# Patient Record
Sex: FEMALE | Race: WHITE | NOT HISPANIC OR LATINO | Employment: UNEMPLOYED | URBAN - METROPOLITAN AREA
[De-identification: names, ages, dates, MRNs, and addresses within clinical notes are randomized per-mention and may not be internally consistent; named-entity substitution may affect disease eponyms.]

---

## 2017-01-14 ENCOUNTER — LAB VISIT (OUTPATIENT)
Dept: LAB | Facility: HOSPITAL | Age: 47
End: 2017-01-14
Attending: INTERNAL MEDICINE
Payer: MEDICARE

## 2017-01-14 DIAGNOSIS — Z94.4 LIVER REPLACED BY TRANSPLANT: ICD-10-CM

## 2017-01-14 LAB
ALBUMIN SERPL BCP-MCNC: 3 G/DL
ALP SERPL-CCNC: 311 U/L
ALT SERPL W/O P-5'-P-CCNC: 42 U/L
ANION GAP SERPL CALC-SCNC: 7 MMOL/L
AST SERPL-CCNC: 42 U/L
BASOPHILS # BLD AUTO: 0.04 K/UL
BASOPHILS NFR BLD: 1.2 %
BILIRUB SERPL-MCNC: 0.9 MG/DL
BUN SERPL-MCNC: 13 MG/DL
CALCIUM SERPL-MCNC: 9.1 MG/DL
CHLORIDE SERPL-SCNC: 105 MMOL/L
CO2 SERPL-SCNC: 26 MMOL/L
CREAT SERPL-MCNC: 0.9 MG/DL
DIFFERENTIAL METHOD: ABNORMAL
EOSINOPHIL # BLD AUTO: 0.1 K/UL
EOSINOPHIL NFR BLD: 4.3 %
ERYTHROCYTE [DISTWIDTH] IN BLOOD BY AUTOMATED COUNT: 16.1 %
EST. GFR  (AFRICAN AMERICAN): >60 ML/MIN/1.73 M^2
EST. GFR  (NON AFRICAN AMERICAN): >60 ML/MIN/1.73 M^2
GGT SERPL-CCNC: 865 U/L
GLUCOSE SERPL-MCNC: 86 MG/DL
HCT VFR BLD AUTO: 33.4 %
HGB BLD-MCNC: 10.7 G/DL
LYMPHOCYTES # BLD AUTO: 0.8 K/UL
LYMPHOCYTES NFR BLD: 25.5 %
MCH RBC QN AUTO: 28.4 PG
MCHC RBC AUTO-ENTMCNC: 32 %
MCV RBC AUTO: 89 FL
MONOCYTES # BLD AUTO: 0.4 K/UL
MONOCYTES NFR BLD: 11.6 %
NEUTROPHILS # BLD AUTO: 1.9 K/UL
NEUTROPHILS NFR BLD: 57.4 %
PLATELET # BLD AUTO: 126 K/UL
PMV BLD AUTO: 11.3 FL
POTASSIUM SERPL-SCNC: 3.7 MMOL/L
PROT SERPL-MCNC: 6.8 G/DL
RBC # BLD AUTO: 3.77 M/UL
SODIUM SERPL-SCNC: 138 MMOL/L
WBC # BLD AUTO: 3.29 K/UL

## 2017-01-14 PROCEDURE — 85025 COMPLETE CBC W/AUTO DIFF WBC: CPT

## 2017-01-14 PROCEDURE — 82977 ASSAY OF GGT: CPT

## 2017-01-14 PROCEDURE — 36415 COLL VENOUS BLD VENIPUNCTURE: CPT | Mod: PO

## 2017-01-14 PROCEDURE — 80197 ASSAY OF TACROLIMUS: CPT

## 2017-01-14 PROCEDURE — 80053 COMPREHEN METABOLIC PANEL: CPT

## 2017-01-15 LAB — TACROLIMUS BLD-MCNC: 11.9 NG/ML

## 2017-01-17 ENCOUNTER — TELEPHONE (OUTPATIENT)
Dept: TRANSPLANT | Facility: CLINIC | Age: 47
End: 2017-01-17

## 2017-01-17 NOTE — TELEPHONE ENCOUNTER
Labs reviewed by Dr. Nielsen  Continue routine labs no changes needed.  Letter sent for next lab appointment 04/15/17

## 2017-01-17 NOTE — TELEPHONE ENCOUNTER
----- Message from Jaya Nielsen MD sent at 1/15/2017 10:45 AM CST -----  Results reviewed. Please advise labs are stable.

## 2017-01-24 DIAGNOSIS — Z94.4 LIVER REPLACED BY TRANSPLANT: ICD-10-CM

## 2017-01-24 RX ORDER — MYCOPHENOLATE MOFETIL 250 MG/1
500 CAPSULE ORAL 2 TIMES DAILY
Qty: 120 CAPSULE | Refills: 6 | Status: SHIPPED | OUTPATIENT
Start: 2017-01-24 | End: 2018-07-06 | Stop reason: SDUPTHER

## 2017-04-22 ENCOUNTER — LAB VISIT (OUTPATIENT)
Dept: LAB | Facility: HOSPITAL | Age: 47
End: 2017-04-22
Attending: INTERNAL MEDICINE
Payer: MEDICARE

## 2017-04-22 DIAGNOSIS — Z94.4 LIVER REPLACED BY TRANSPLANT: ICD-10-CM

## 2017-04-22 LAB
ALBUMIN SERPL BCP-MCNC: 3.1 G/DL
ALP SERPL-CCNC: 337 U/L
ALT SERPL W/O P-5'-P-CCNC: 34 U/L
ANION GAP SERPL CALC-SCNC: 6 MMOL/L
AST SERPL-CCNC: 38 U/L
BASOPHILS # BLD AUTO: 0.04 K/UL
BASOPHILS NFR BLD: 0.9 %
BILIRUB SERPL-MCNC: 0.9 MG/DL
BUN SERPL-MCNC: 19 MG/DL
CALCIUM SERPL-MCNC: 9.4 MG/DL
CHLORIDE SERPL-SCNC: 102 MMOL/L
CO2 SERPL-SCNC: 29 MMOL/L
CREAT SERPL-MCNC: 1 MG/DL
DIFFERENTIAL METHOD: ABNORMAL
EOSINOPHIL # BLD AUTO: 0.2 K/UL
EOSINOPHIL NFR BLD: 4.5 %
ERYTHROCYTE [DISTWIDTH] IN BLOOD BY AUTOMATED COUNT: 14.6 %
EST. GFR  (AFRICAN AMERICAN): >60 ML/MIN/1.73 M^2
EST. GFR  (NON AFRICAN AMERICAN): >60 ML/MIN/1.73 M^2
GGT SERPL-CCNC: 1245 U/L
GLUCOSE SERPL-MCNC: 80 MG/DL
HCT VFR BLD AUTO: 34 %
HGB BLD-MCNC: 10.9 G/DL
LYMPHOCYTES # BLD AUTO: 1 K/UL
LYMPHOCYTES NFR BLD: 20.8 %
MCH RBC QN AUTO: 28.7 PG
MCHC RBC AUTO-ENTMCNC: 32.1 %
MCV RBC AUTO: 90 FL
MONOCYTES # BLD AUTO: 0.5 K/UL
MONOCYTES NFR BLD: 11.4 %
NEUTROPHILS # BLD AUTO: 2.9 K/UL
NEUTROPHILS NFR BLD: 62.2 %
PLATELET # BLD AUTO: 107 K/UL
PMV BLD AUTO: 10.9 FL
POTASSIUM SERPL-SCNC: 3.8 MMOL/L
PROT SERPL-MCNC: 6.6 G/DL
RBC # BLD AUTO: 3.8 M/UL
SODIUM SERPL-SCNC: 137 MMOL/L
WBC # BLD AUTO: 4.66 K/UL

## 2017-04-22 PROCEDURE — 36415 COLL VENOUS BLD VENIPUNCTURE: CPT | Mod: PO

## 2017-04-22 PROCEDURE — 82977 ASSAY OF GGT: CPT

## 2017-04-22 PROCEDURE — 80197 ASSAY OF TACROLIMUS: CPT

## 2017-04-22 PROCEDURE — 85025 COMPLETE CBC W/AUTO DIFF WBC: CPT

## 2017-04-22 PROCEDURE — 80053 COMPREHEN METABOLIC PANEL: CPT

## 2017-04-23 LAB — TACROLIMUS BLD-MCNC: 16.1 NG/ML

## 2017-04-24 ENCOUNTER — TELEPHONE (OUTPATIENT)
Dept: TRANSPLANT | Facility: CLINIC | Age: 47
End: 2017-04-24

## 2017-04-24 ENCOUNTER — PATIENT MESSAGE (OUTPATIENT)
Dept: TRANSPLANT | Facility: CLINIC | Age: 47
End: 2017-04-24

## 2017-04-24 NOTE — TELEPHONE ENCOUNTER
----- Message from Jaya Nielsen MD sent at 4/24/2017  8:06 AM CDT -----  Is it an accurate tac level?

## 2017-04-28 ENCOUNTER — TELEPHONE (OUTPATIENT)
Dept: TRANSPLANT | Facility: CLINIC | Age: 47
End: 2017-04-28

## 2017-04-28 DIAGNOSIS — Z94.4 LIVER REPLACED BY TRANSPLANT: ICD-10-CM

## 2017-04-28 NOTE — TELEPHONE ENCOUNTER
----- Message from Jaya Nielsen MD sent at 4/28/2017  1:28 PM CDT -----  Reduce tac to 2/1  ----- Message -----     From: Maude Chau RN     Sent: 4/28/2017  12:56 PM       To: Jaya Nielsen MD    Yes this is an accurate level.  She did take her medication later than usual the night before.  ----- Message -----     From: Jaya Nielsen MD     Sent: 4/24/2017   8:06 AM       To: [facility] Post-Liver Transplant Clinical    Is it an accurate tac level?

## 2017-04-28 NOTE — TELEPHONE ENCOUNTER
Labs reviewed by Dr. Nielsen  Called patient to have her reduce Prograf level to 2 mg int he morning and 1 mg in the evening, repeat labs on 05/13/17.  Patient repeated and verbalized understanding.

## 2017-04-28 NOTE — TELEPHONE ENCOUNTER
Called patient again to see if she took her Prograf before her labs.    Patient stated she took her medication late the night before the labs.

## 2017-04-30 RX ORDER — TACROLIMUS 1 MG/1
CAPSULE ORAL
Qty: 270 CAPSULE | Refills: 3 | Status: SHIPPED | OUTPATIENT
Start: 2017-04-30 | End: 2017-05-16 | Stop reason: SDUPTHER

## 2017-05-05 RX ORDER — ONDANSETRON HYDROCHLORIDE 8 MG/1
8 TABLET, FILM COATED ORAL EVERY 8 HOURS PRN
Qty: 30 TABLET | Refills: 3 | Status: SHIPPED | OUTPATIENT
Start: 2017-05-05 | End: 2018-03-22 | Stop reason: SDUPTHER

## 2017-05-12 ENCOUNTER — LAB VISIT (OUTPATIENT)
Dept: LAB | Facility: HOSPITAL | Age: 47
End: 2017-05-12
Attending: INTERNAL MEDICINE
Payer: MEDICARE

## 2017-05-12 DIAGNOSIS — Z94.4 LIVER REPLACED BY TRANSPLANT: ICD-10-CM

## 2017-05-12 DIAGNOSIS — E03.9 UNSPECIFIED HYPOTHYROIDISM: Primary | ICD-10-CM

## 2017-05-12 LAB
ALBUMIN SERPL BCP-MCNC: 3.5 G/DL
ALP SERPL-CCNC: 352 U/L
ALT SERPL W/O P-5'-P-CCNC: 73 U/L
ANION GAP SERPL CALC-SCNC: 8 MMOL/L
AST SERPL-CCNC: 85 U/L
BASOPHILS # BLD AUTO: 0.03 K/UL
BASOPHILS NFR BLD: 0.9 %
BILIRUB SERPL-MCNC: 1.1 MG/DL
BUN SERPL-MCNC: 13 MG/DL
CALCIUM SERPL-MCNC: 8.9 MG/DL
CHLORIDE SERPL-SCNC: 101 MMOL/L
CHOLEST/HDLC SERPL: 2.2 {RATIO}
CO2 SERPL-SCNC: 25 MMOL/L
CREAT SERPL-MCNC: 0.9 MG/DL
DIFFERENTIAL METHOD: ABNORMAL
EOSINOPHIL # BLD AUTO: 0.1 K/UL
EOSINOPHIL NFR BLD: 3.8 %
ERYTHROCYTE [DISTWIDTH] IN BLOOD BY AUTOMATED COUNT: 14.8 %
EST. GFR  (AFRICAN AMERICAN): >60 ML/MIN/1.73 M^2
EST. GFR  (NON AFRICAN AMERICAN): >60 ML/MIN/1.73 M^2
GGT SERPL-CCNC: 1235 U/L
GLUCOSE SERPL-MCNC: 82 MG/DL
HCT VFR BLD AUTO: 36.6 %
HDL/CHOLESTEROL RATIO: 45 %
HDLC SERPL-MCNC: 134 MG/DL
HDLC SERPL-MCNC: 298 MG/DL
HGB BLD-MCNC: 12.2 G/DL
LDLC SERPL CALC-MCNC: 147.2 MG/DL
LYMPHOCYTES # BLD AUTO: 1 K/UL
LYMPHOCYTES NFR BLD: 27.9 %
MCH RBC QN AUTO: 29.6 PG
MCHC RBC AUTO-ENTMCNC: 33.3 %
MCV RBC AUTO: 89 FL
MONOCYTES # BLD AUTO: 0.3 K/UL
MONOCYTES NFR BLD: 9.4 %
NEUTROPHILS # BLD AUTO: 2 K/UL
NEUTROPHILS NFR BLD: 58 %
NONHDLC SERPL-MCNC: 164 MG/DL
PLATELET # BLD AUTO: 126 K/UL
PMV BLD AUTO: 12 FL
POTASSIUM SERPL-SCNC: 4.1 MMOL/L
PROT SERPL-MCNC: 7.6 G/DL
RBC # BLD AUTO: 4.12 M/UL
SODIUM SERPL-SCNC: 134 MMOL/L
T4 FREE SERPL-MCNC: 0.92 NG/DL
TRIGL SERPL-MCNC: 84 MG/DL
TSH SERPL DL<=0.005 MIU/L-ACNC: 1.59 UIU/ML
WBC # BLD AUTO: 3.4 K/UL

## 2017-05-12 PROCEDURE — 85025 COMPLETE CBC W/AUTO DIFF WBC: CPT

## 2017-05-12 PROCEDURE — 84443 ASSAY THYROID STIM HORMONE: CPT

## 2017-05-12 PROCEDURE — 84439 ASSAY OF FREE THYROXINE: CPT

## 2017-05-12 PROCEDURE — 80053 COMPREHEN METABOLIC PANEL: CPT

## 2017-05-12 PROCEDURE — 80197 ASSAY OF TACROLIMUS: CPT

## 2017-05-12 PROCEDURE — 82977 ASSAY OF GGT: CPT

## 2017-05-12 PROCEDURE — 80061 LIPID PANEL: CPT

## 2017-05-12 PROCEDURE — 82652 VIT D 1 25-DIHYDROXY: CPT

## 2017-05-13 LAB — TACROLIMUS BLD-MCNC: 14.6 NG/ML

## 2017-05-15 LAB — 1,25(OH)2D3 SERPL-MCNC: 70 PG/ML

## 2017-05-16 DIAGNOSIS — Z94.4 LIVER REPLACED BY TRANSPLANT: ICD-10-CM

## 2017-05-16 RX ORDER — TACROLIMUS 1 MG/1
1 CAPSULE ORAL EVERY 12 HOURS
Qty: 180 CAPSULE | Refills: 3 | Status: SHIPPED | OUTPATIENT
Start: 2017-05-16 | End: 2017-06-19 | Stop reason: SDUPTHER

## 2017-05-16 NOTE — TELEPHONE ENCOUNTER
Labs reviewed by Dr. Nielsen  Called patient to reduce Prograf to 1 mg bid and repeat labs in 1 month.  Patient repeated and verbalized understanding.

## 2017-05-16 NOTE — TELEPHONE ENCOUNTER
----- Message from Jaya Nielsen MD sent at 5/16/2017  9:07 AM CDT -----  Please reduce tac to 1/1  ----- Message -----     From: Maude Chau RN     Sent: 5/15/2017  10:40 AM       To: Jaya Nielsen MD    yes  ----- Message -----     From: Jaya Nielsen MD     Sent: 5/15/2017   7:39 AM       To: [facility] Post-Liver Transplant Clinical    Is tacrolimus level accurate

## 2017-06-17 ENCOUNTER — LAB VISIT (OUTPATIENT)
Dept: LAB | Facility: HOSPITAL | Age: 47
End: 2017-06-17
Attending: INTERNAL MEDICINE
Payer: MEDICARE

## 2017-06-17 DIAGNOSIS — Z94.4 LIVER REPLACED BY TRANSPLANT: ICD-10-CM

## 2017-06-17 LAB
ALBUMIN SERPL BCP-MCNC: 2.9 G/DL
ALP SERPL-CCNC: 254 U/L
ALT SERPL W/O P-5'-P-CCNC: 25 U/L
ANION GAP SERPL CALC-SCNC: 8 MMOL/L
AST SERPL-CCNC: 40 U/L
BASOPHILS # BLD AUTO: 0.04 K/UL
BASOPHILS NFR BLD: 1 %
BILIRUB SERPL-MCNC: 0.9 MG/DL
BUN SERPL-MCNC: 21 MG/DL
CALCIUM SERPL-MCNC: 8.7 MG/DL
CHLORIDE SERPL-SCNC: 102 MMOL/L
CO2 SERPL-SCNC: 25 MMOL/L
CREAT SERPL-MCNC: 0.8 MG/DL
DIFFERENTIAL METHOD: ABNORMAL
EOSINOPHIL # BLD AUTO: 0.4 K/UL
EOSINOPHIL NFR BLD: 9 %
ERYTHROCYTE [DISTWIDTH] IN BLOOD BY AUTOMATED COUNT: 14.2 %
EST. GFR  (AFRICAN AMERICAN): >60 ML/MIN/1.73 M^2
EST. GFR  (NON AFRICAN AMERICAN): >60 ML/MIN/1.73 M^2
GGT SERPL-CCNC: 551 U/L
GLUCOSE SERPL-MCNC: 78 MG/DL
HCT VFR BLD AUTO: 29.3 %
HGB BLD-MCNC: 9.5 G/DL
LYMPHOCYTES # BLD AUTO: 0.8 K/UL
LYMPHOCYTES NFR BLD: 18.6 %
MCH RBC QN AUTO: 28.5 PG
MCHC RBC AUTO-ENTMCNC: 32.4 %
MCV RBC AUTO: 88 FL
MONOCYTES # BLD AUTO: 0.6 K/UL
MONOCYTES NFR BLD: 13.1 %
NEUTROPHILS # BLD AUTO: 2.4 K/UL
NEUTROPHILS NFR BLD: 58.1 %
PLATELET # BLD AUTO: 126 K/UL
PMV BLD AUTO: 9.9 FL
POTASSIUM SERPL-SCNC: 4.4 MMOL/L
PROT SERPL-MCNC: 6.2 G/DL
RBC # BLD AUTO: 3.33 M/UL
SODIUM SERPL-SCNC: 135 MMOL/L
WBC # BLD AUTO: 4.2 K/UL

## 2017-06-17 PROCEDURE — 82977 ASSAY OF GGT: CPT

## 2017-06-17 PROCEDURE — 36415 COLL VENOUS BLD VENIPUNCTURE: CPT | Mod: PO

## 2017-06-17 PROCEDURE — 80053 COMPREHEN METABOLIC PANEL: CPT

## 2017-06-17 PROCEDURE — 85025 COMPLETE CBC W/AUTO DIFF WBC: CPT

## 2017-06-17 PROCEDURE — 80197 ASSAY OF TACROLIMUS: CPT

## 2017-06-18 LAB — TACROLIMUS BLD-MCNC: 3.5 NG/ML

## 2017-06-19 DIAGNOSIS — Z94.4 LIVER REPLACED BY TRANSPLANT: ICD-10-CM

## 2017-06-19 DIAGNOSIS — Z94.4 LIVER REPLACED BY TRANSPLANT: Primary | ICD-10-CM

## 2017-06-19 RX ORDER — TACROLIMUS 1 MG/1
CAPSULE ORAL
Qty: 270 CAPSULE | Refills: 3 | Status: SHIPPED | OUTPATIENT
Start: 2017-06-19 | End: 2018-03-05 | Stop reason: SDUPTHER

## 2017-06-19 NOTE — TELEPHONE ENCOUNTER
Labs reviewed by Dr. Nielsen  Called patient to let her know to increase Prograf to 2 mg in the morning and 1 mg in the evening.  She will repeat labs on 07/15/17, left voicemail also sent letter

## 2017-06-21 DIAGNOSIS — Z94.4 LIVER REPLACED BY TRANSPLANT: Primary | ICD-10-CM

## 2017-06-28 ENCOUNTER — PATIENT MESSAGE (OUTPATIENT)
Dept: TRANSPLANT | Facility: CLINIC | Age: 47
End: 2017-06-28

## 2017-07-15 ENCOUNTER — LAB VISIT (OUTPATIENT)
Dept: LAB | Facility: HOSPITAL | Age: 47
End: 2017-07-15
Attending: INTERNAL MEDICINE
Payer: MEDICARE

## 2017-07-15 DIAGNOSIS — Z94.4 LIVER REPLACED BY TRANSPLANT: ICD-10-CM

## 2017-07-15 LAB
ALBUMIN SERPL BCP-MCNC: 3.1 G/DL
ALP SERPL-CCNC: 403 U/L
ALT SERPL W/O P-5'-P-CCNC: 74 U/L
ANION GAP SERPL CALC-SCNC: 9 MMOL/L
AST SERPL-CCNC: 67 U/L
BASOPHILS # BLD AUTO: 0.05 K/UL
BASOPHILS NFR BLD: 1.6 %
BILIRUB SERPL-MCNC: 1 MG/DL
BUN SERPL-MCNC: 22 MG/DL
CALCIUM SERPL-MCNC: 9 MG/DL
CHLORIDE SERPL-SCNC: 100 MMOL/L
CO2 SERPL-SCNC: 24 MMOL/L
CREAT SERPL-MCNC: 0.9 MG/DL
DIFFERENTIAL METHOD: ABNORMAL
EOSINOPHIL # BLD AUTO: 0.2 K/UL
EOSINOPHIL NFR BLD: 7.9 %
ERYTHROCYTE [DISTWIDTH] IN BLOOD BY AUTOMATED COUNT: 15.3 %
EST. GFR  (AFRICAN AMERICAN): >60 ML/MIN/1.73 M^2
EST. GFR  (NON AFRICAN AMERICAN): >60 ML/MIN/1.73 M^2
GGT SERPL-CCNC: 942 U/L
GLUCOSE SERPL-MCNC: 86 MG/DL
HCT VFR BLD AUTO: 32.4 %
HGB BLD-MCNC: 10.9 G/DL
LYMPHOCYTES # BLD AUTO: 0.7 K/UL
LYMPHOCYTES NFR BLD: 23 %
MCH RBC QN AUTO: 29.7 PG
MCHC RBC AUTO-ENTMCNC: 33.6 %
MCV RBC AUTO: 88 FL
MONOCYTES # BLD AUTO: 0.4 K/UL
MONOCYTES NFR BLD: 12.1 %
NEUTROPHILS # BLD AUTO: 1.7 K/UL
NEUTROPHILS NFR BLD: 55.1 %
PLATELET # BLD AUTO: 114 K/UL
PMV BLD AUTO: 10.5 FL
POTASSIUM SERPL-SCNC: 4.4 MMOL/L
PROT SERPL-MCNC: 6.8 G/DL
RBC # BLD AUTO: 3.67 M/UL
SODIUM SERPL-SCNC: 133 MMOL/L
WBC # BLD AUTO: 3.05 K/UL

## 2017-07-15 PROCEDURE — 80053 COMPREHEN METABOLIC PANEL: CPT

## 2017-07-15 PROCEDURE — 85025 COMPLETE CBC W/AUTO DIFF WBC: CPT

## 2017-07-15 PROCEDURE — 82977 ASSAY OF GGT: CPT

## 2017-07-15 PROCEDURE — 36415 COLL VENOUS BLD VENIPUNCTURE: CPT | Mod: PO

## 2017-07-15 PROCEDURE — 80197 ASSAY OF TACROLIMUS: CPT

## 2017-07-16 LAB — TACROLIMUS BLD-MCNC: 9.3 NG/ML

## 2017-07-17 ENCOUNTER — TELEPHONE (OUTPATIENT)
Dept: TRANSPLANT | Facility: CLINIC | Age: 47
End: 2017-07-17

## 2017-07-17 NOTE — TELEPHONE ENCOUNTER
----- Message from Jaya Nielsen MD sent at 7/17/2017  9:26 AM CDT -----  Results reviewed. Please advise labs are stable.

## 2017-07-17 NOTE — TELEPHONE ENCOUNTER
Labs reviewed by Dr. Nielsen  Continue routine labs no changes needed.  Letter sent for next lab appointment 09/09/17

## 2017-09-07 ENCOUNTER — LAB VISIT (OUTPATIENT)
Dept: LAB | Facility: HOSPITAL | Age: 47
End: 2017-09-07
Attending: INTERNAL MEDICINE
Payer: MEDICARE

## 2017-09-07 DIAGNOSIS — Z94.4 LIVER REPLACED BY TRANSPLANT: ICD-10-CM

## 2017-09-07 LAB
ALBUMIN SERPL BCP-MCNC: 3 G/DL
ALP SERPL-CCNC: 462 U/L
ALT SERPL W/O P-5'-P-CCNC: 36 U/L
ANION GAP SERPL CALC-SCNC: 11 MMOL/L
AST SERPL-CCNC: 46 U/L
BASOPHILS # BLD AUTO: 0.05 K/UL
BASOPHILS NFR BLD: 0.9 %
BILIRUB SERPL-MCNC: 0.9 MG/DL
BUN SERPL-MCNC: 15 MG/DL
CALCIUM SERPL-MCNC: 9.2 MG/DL
CHLORIDE SERPL-SCNC: 101 MMOL/L
CO2 SERPL-SCNC: 23 MMOL/L
CREAT SERPL-MCNC: 0.8 MG/DL
DIFFERENTIAL METHOD: ABNORMAL
EOSINOPHIL # BLD AUTO: 1.4 K/UL
EOSINOPHIL NFR BLD: 25 %
ERYTHROCYTE [DISTWIDTH] IN BLOOD BY AUTOMATED COUNT: 15.5 %
EST. GFR  (AFRICAN AMERICAN): >60 ML/MIN/1.73 M^2
EST. GFR  (NON AFRICAN AMERICAN): >60 ML/MIN/1.73 M^2
GGT SERPL-CCNC: 762 U/L
GLUCOSE SERPL-MCNC: 89 MG/DL
HCT VFR BLD AUTO: 31.9 %
HGB BLD-MCNC: 10.9 G/DL
LYMPHOCYTES # BLD AUTO: 0.9 K/UL
LYMPHOCYTES NFR BLD: 15 %
MCH RBC QN AUTO: 28.8 PG
MCHC RBC AUTO-ENTMCNC: 34.2 G/DL
MCV RBC AUTO: 84 FL
MONOCYTES # BLD AUTO: 0.6 K/UL
MONOCYTES NFR BLD: 10.4 %
NEUTROPHILS # BLD AUTO: 2.8 K/UL
NEUTROPHILS NFR BLD: 48.5 %
PLATELET # BLD AUTO: 142 K/UL
PMV BLD AUTO: 11.6 FL
POTASSIUM SERPL-SCNC: 4.3 MMOL/L
PROT SERPL-MCNC: 6.8 G/DL
RBC # BLD AUTO: 3.79 M/UL
SODIUM SERPL-SCNC: 135 MMOL/L
WBC # BLD AUTO: 5.67 K/UL

## 2017-09-07 PROCEDURE — 82977 ASSAY OF GGT: CPT

## 2017-09-07 PROCEDURE — 36415 COLL VENOUS BLD VENIPUNCTURE: CPT | Mod: PO

## 2017-09-07 PROCEDURE — 85025 COMPLETE CBC W/AUTO DIFF WBC: CPT

## 2017-09-07 PROCEDURE — 80197 ASSAY OF TACROLIMUS: CPT

## 2017-09-07 PROCEDURE — 80053 COMPREHEN METABOLIC PANEL: CPT

## 2017-09-08 LAB — TACROLIMUS BLD-MCNC: 8.5 NG/ML

## 2017-09-11 ENCOUNTER — TELEPHONE (OUTPATIENT)
Dept: TRANSPLANT | Facility: CLINIC | Age: 47
End: 2017-09-11

## 2017-09-11 NOTE — TELEPHONE ENCOUNTER
Labs reviewed by Dr. Nielsen  Continue routine labs no changes needed.  Letter sent for next lab appointment 12/09/17

## 2017-09-11 NOTE — TELEPHONE ENCOUNTER
----- Message from Jaya Nielsen MD sent at 9/11/2017  7:41 AM CDT -----  Results reviewed. Please advise labs are stable.

## 2017-12-09 ENCOUNTER — PATIENT MESSAGE (OUTPATIENT)
Dept: TRANSPLANT | Facility: CLINIC | Age: 47
End: 2017-12-09

## 2017-12-12 ENCOUNTER — PATIENT MESSAGE (OUTPATIENT)
Dept: TRANSPLANT | Facility: CLINIC | Age: 47
End: 2017-12-12

## 2017-12-13 ENCOUNTER — TELEPHONE (OUTPATIENT)
Dept: TRANSPLANT | Facility: CLINIC | Age: 47
End: 2017-12-13

## 2017-12-14 ENCOUNTER — LAB VISIT (OUTPATIENT)
Dept: LAB | Facility: HOSPITAL | Age: 47
End: 2017-12-14
Attending: INTERNAL MEDICINE
Payer: MEDICARE

## 2017-12-14 DIAGNOSIS — Z94.4 LIVER REPLACED BY TRANSPLANT: ICD-10-CM

## 2017-12-14 LAB
ALBUMIN SERPL BCP-MCNC: 3 G/DL
ALP SERPL-CCNC: 307 U/L
ALT SERPL W/O P-5'-P-CCNC: 35 U/L
ANION GAP SERPL CALC-SCNC: 6 MMOL/L
AST SERPL-CCNC: 44 U/L
BASOPHILS # BLD AUTO: 0.03 K/UL
BASOPHILS NFR BLD: 0.9 %
BILIRUB SERPL-MCNC: 0.9 MG/DL
BUN SERPL-MCNC: 12 MG/DL
CALCIUM SERPL-MCNC: 8.9 MG/DL
CHLORIDE SERPL-SCNC: 98 MMOL/L
CO2 SERPL-SCNC: 29 MMOL/L
CREAT SERPL-MCNC: 0.8 MG/DL
DIFFERENTIAL METHOD: ABNORMAL
EOSINOPHIL # BLD AUTO: 0.1 K/UL
EOSINOPHIL NFR BLD: 4.1 %
ERYTHROCYTE [DISTWIDTH] IN BLOOD BY AUTOMATED COUNT: 14.4 %
EST. GFR  (AFRICAN AMERICAN): >60 ML/MIN/1.73 M^2
EST. GFR  (NON AFRICAN AMERICAN): >60 ML/MIN/1.73 M^2
GGT SERPL-CCNC: 639 U/L
GLUCOSE SERPL-MCNC: 91 MG/DL
HCT VFR BLD AUTO: 32.3 %
HGB BLD-MCNC: 10.4 G/DL
IMM GRANULOCYTES # BLD AUTO: 0 K/UL
IMM GRANULOCYTES NFR BLD AUTO: 0 %
LYMPHOCYTES # BLD AUTO: 0.7 K/UL
LYMPHOCYTES NFR BLD: 21.3 %
MCH RBC QN AUTO: 28.1 PG
MCHC RBC AUTO-ENTMCNC: 32.2 G/DL
MCV RBC AUTO: 87 FL
MONOCYTES # BLD AUTO: 0.5 K/UL
MONOCYTES NFR BLD: 13.3 %
NEUTROPHILS # BLD AUTO: 2 K/UL
NEUTROPHILS NFR BLD: 60.4 %
NRBC BLD-RTO: 0 /100 WBC
PLATELET # BLD AUTO: 134 K/UL
PMV BLD AUTO: 11.8 FL
POTASSIUM SERPL-SCNC: 4.1 MMOL/L
PROT SERPL-MCNC: 6.8 G/DL
RBC # BLD AUTO: 3.7 M/UL
SODIUM SERPL-SCNC: 133 MMOL/L
WBC # BLD AUTO: 3.38 K/UL

## 2017-12-14 PROCEDURE — 82977 ASSAY OF GGT: CPT

## 2017-12-14 PROCEDURE — 80053 COMPREHEN METABOLIC PANEL: CPT

## 2017-12-14 PROCEDURE — 85025 COMPLETE CBC W/AUTO DIFF WBC: CPT

## 2017-12-14 PROCEDURE — 36415 COLL VENOUS BLD VENIPUNCTURE: CPT | Mod: PO

## 2017-12-14 PROCEDURE — 80197 ASSAY OF TACROLIMUS: CPT

## 2017-12-15 ENCOUNTER — TELEPHONE (OUTPATIENT)
Dept: TRANSPLANT | Facility: CLINIC | Age: 47
End: 2017-12-15

## 2017-12-15 LAB — TACROLIMUS BLD-MCNC: 6.8 NG/ML

## 2017-12-15 NOTE — TELEPHONE ENCOUNTER
Labs reviewed by Dr. Nielsen  Continue routine labs no changes needed.  Letter sent for next lab appointment 03/03/18

## 2017-12-15 NOTE — TELEPHONE ENCOUNTER
----- Message from Jaya Nielsen MD sent at 12/15/2017 10:49 AM CST -----  Results reviewed. Please advise labs are stable.

## 2018-01-03 ENCOUNTER — OFFICE VISIT (OUTPATIENT)
Dept: TRANSPLANT | Facility: CLINIC | Age: 48
End: 2018-01-03
Payer: MEDICARE

## 2018-01-03 VITALS
WEIGHT: 158.75 LBS | HEART RATE: 92 BPM | SYSTOLIC BLOOD PRESSURE: 158 MMHG | TEMPERATURE: 99 F | DIASTOLIC BLOOD PRESSURE: 79 MMHG | HEIGHT: 59 IN | OXYGEN SATURATION: 100 % | RESPIRATION RATE: 18 BRPM | BODY MASS INDEX: 32 KG/M2

## 2018-01-03 DIAGNOSIS — K83.1 BILIARY OBSTRUCTION OF TRANSPLANTED LIVER: ICD-10-CM

## 2018-01-03 DIAGNOSIS — T86.41 CHRONIC REJECTION OF LIVER TRANSPLANT: ICD-10-CM

## 2018-01-03 DIAGNOSIS — Z94.4 S/P LIVER TRANSPLANT: Primary | Chronic | ICD-10-CM

## 2018-01-03 DIAGNOSIS — Z79.60 LONG-TERM USE OF IMMUNOSUPPRESSANT MEDICATION: ICD-10-CM

## 2018-01-03 DIAGNOSIS — G40.909 NONINTRACTABLE EPILEPSY WITHOUT STATUS EPILEPTICUS, UNSPECIFIED EPILEPSY TYPE: ICD-10-CM

## 2018-01-03 DIAGNOSIS — Z29.89 PROPHYLACTIC IMMUNOTHERAPY: Chronic | ICD-10-CM

## 2018-01-03 DIAGNOSIS — T86.49 BILIARY OBSTRUCTION OF TRANSPLANTED LIVER: ICD-10-CM

## 2018-01-03 DIAGNOSIS — R55 SYNCOPE AND COLLAPSE: ICD-10-CM

## 2018-01-03 PROCEDURE — 99999 PR PBB SHADOW E&M-EST. PATIENT-LVL IV: CPT | Mod: PBBFAC,,, | Performed by: NURSE PRACTITIONER

## 2018-01-03 PROCEDURE — 99214 OFFICE O/P EST MOD 30 MIN: CPT | Mod: S$GLB,,, | Performed by: NURSE PRACTITIONER

## 2018-01-03 RX ORDER — LACTULOSE 10 G/15ML
SOLUTION ORAL; RECTAL
Qty: 2025 ML | Refills: 0 | Status: SHIPPED | OUTPATIENT
Start: 2018-01-03 | End: 2018-01-03 | Stop reason: SDUPTHER

## 2018-01-03 NOTE — LETTER
January 3, 2018        Jordana Woods  [address] [facility] [address]  Phone: [phone]  Fax: [phone]             [facility] Trans[facility]  [address]  [facility] [address]  Phone: [phone]   Patient: Elda Hernandez   MR Number: [id number]   YOB: 1970   Date of Visit: 1/3/2018       Dear Dr. Jordana Woods    Thank you for referring Elda Hernandez to me for evaluation. Attached you will find relevant portions of my assessment and plan of care.    If you have questions, please do not hesitate to call me. I look forward to following Elda Hernandez along with you.    Sincerely,    Raisa Duff, ESTRADA    Enclosure    If you would like to receive this communication electronically, please contact [email or web address] or [phone] to request [employer] Link access.    [employer] Link is a tool which provides read-only access to select patient information with whom you have a relationship. Its easy to use and provides real time access to review your patients record including encounter summaries, notes, results, and demographic information.    If you feel you have received this communication in error or would no longer like to receive these types of communications, please e-mail [email or web address]

## 2018-01-03 NOTE — PROGRESS NOTES
Transplant Hepatology  Liver Transplant Recipient Follow-up    Transplant Date: 9/23/2002  UNOS Native Liver Dx: METDIS: Glycogen Storage Disease Type I (GSD-I)    Elda Doran is here for follow up of her liver transplant.    ORGAN: LIVER  Whole or Partial: whole liver  Donor CMV Status: Positive  Donor HCV Status: Negative  Donor HBcAb: Negative      She has had the following complications since transplant: chronic rejection and biliary stricture.   Liver biopsy in 2015 with features of chronic and acute rejection. Treated with steroid pulse and taper. Last ERCP in 2015 with sphincterotomy; biliary stent removed. The noted complications are well controlled. LFTs have since stabilized.     Subjective:     Interval History: Elda Doran was last seen on 10/10/2016 by Dr. Nielsen. Currently, she is doing adequately. Current complaints include occasional dizziness. She has fallen twice over the last 6 months. She has a history of seizures, currently follows with a Neurologist on the [facility]. She is unsure if the dizziness is related to a new seizure medication as her Topamax was recently stopped. She cannot remember the name of the new seizure medication but has follow-up with her Neurologist in 4 weeks. She is wondering whether the dizziness is secondary to liver problems, unsure if her ammonia levels are increasing. Denies any confusion, disorientation, or slowed mentation. Also experiencing constipation and weight gain. Does not appear to be retaining fluid. No changes to diet or activity but 50 lb weight gain noted since Oct 2016.         She is now 15 years post transplant. Current immunosuppression regimen includes tacro 2/2 and  mg BID. Last tacro level 6.8. LFTs appear stable, Bili normal at 0.9. Transaminases mildly elevated, stable. Alk phos stable as well. Denies abdominal pain, jaundice, fever, chills, nausea, or vomiting.     Review of Systems   ROS:   GENERAL: Denies fever, chills. (+) weight gain.  "Denies fatigue  HEENT: Denies headaches. (+) dizziness. Denies vision/hearing changes.  CARDIOVASCULAR: Denies chest pain, palpitations, or edema  RESPIRATORY: Denies dyspnea, cough  GI: Denies abdominal pain, rectal bleeding, nausea, vomiting. (+) constipation.   : Denies dysuria, hematuria   SKIN: Denies rash, itching   NEURO: Denies confusion, memory loss, or mood changes  PSYCH: Denies depression or anxiety  HEME/LYMPH: Denies easy bruising or bleeding      Objective:     Physical Exam   PHYSICAL EXAM:   Friendly White female, in no acute distress; alert and oriented to person, place and time  VITALS: BP (!) 158/79 (BP Location: Right arm, Patient Position: Sitting, BP Method: Medium (Automatic))   Pulse 92   Temp 98.7 °F (37.1 °C) (Oral)   Resp 18   Ht 4' 11" (1.499 m)   Wt 72 kg (158 lb 11.7 oz)   SpO2 100%   BMI 32.06 kg/m²   HENT: Normocephalic, without obvious abnormality. Oral mucosa pink and moist. Dentition good.  EYES: Sclerae anicteric. No conjunctival pallor.   NECK: Supple. No masses or cervical adenopathy.  CARDIOVASCULAR: Regular rate and rhythm. No murmurs.  RESPIRATORY: Normal respiratory effort. BBS CTA. No wheezes or crackles.  GI: Soft, non-tender, non-distended. No hepatosplenomegaly. No masses palpable. No ascites.  EXTREMITIES:  No clubbing, cyanosis or edema.  SKIN: Warm and dry. No jaundice. No rashes noted to exposed skin. No telangectasias noted. No palmar erythema.  NEURO:  Normal gate. No asterixis.  PSYCH:  Memory intact. Thought and speech pattern appropriate. Behavior normal. No depression or anxiety noted.    Lab Results   Component Value Date    BILITOT 0.9 12/14/2017    AST 44 (H) 12/14/2017    ALT 35 12/14/2017    ALKPHOS 307 (H) 12/14/2017    CREATININE 0.8 12/14/2017    ALBUMIN 3.0 (L) 12/14/2017     Lab Results   Component Value Date    WBC 3.38 (L) 12/14/2017    HGB 10.4 (L) 12/14/2017    HCT 32.3 (L) 12/14/2017     (L) 12/14/2017     Lab Results   Component " Value Date    TACROLIMUS 6.8 12/14/2017       Assessment/Plan:   1. 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 Transplant complicated by chronic and acute rejection (treated with steroids). LFTs appear stable at this time.   -- Continue monitoring symptoms, labs, and drug levels for drug-related toxicity and side effects.  -- Continue with post transplant labs per protocol.    2. Prophylactic immunotherapy  -- Currently on tacro 2 mg BID and  mg BID  -- Last tacro level 6.8   -- Chronic immunosuppressive medications for rejection prophylaxis at target. No adjustment needed at this time.     3. History of biliary stricture of transplanted liver    4. Chronic rejection of transplanted organ     4. Dizziness and falls  -- Unclear if this is related to medication, seizure hx, or if patient is experiencing s/s hepatic encephalopathy   -- Has follow-up in the next few weeks with Neurology  -- Can try restarting lactulose (as this may also help with constipation problem). Patient reports history of difficulty tolerating lactulose so will send Xifaxan as well in case she is unable to take lactulose.     5. Epilepsy  -- Following with Neurology     Health Maintenance:  -- Instructed to f/u with PCP for regular health maintenance care including cancer and bone mineral density screenings   -- Also recommend f/u with psychiatry for history of anxiety and depression   -- Follow-up with GYN for routine mammograms and pap smears   -- Reviewed need to avoid sun exposure with use of sunblock, hats, long sleeves related to increased risk of skin cancers. Recommend f/u with Dermatology for annual skin checks    Return to clinic in 6 months      Raisa Duff NP           [facility] Patient Status  Functional Status: 90% - Able to carry on normal activity: minor symptoms of disease  Physical Capacity: No Limitations

## 2018-01-04 ENCOUNTER — TELEPHONE (OUTPATIENT)
Dept: PHARMACY | Facility: CLINIC | Age: 48
End: 2018-01-04

## 2018-01-04 RX ORDER — LACTULOSE 10 G/15ML
SOLUTION ORAL; RECTAL
Qty: 3645 ML | Refills: 0 | Status: ON HOLD | OUTPATIENT
Start: 2018-01-04 | End: 2020-09-04 | Stop reason: ALTCHOICE

## 2018-01-08 ENCOUNTER — TELEPHONE (OUTPATIENT)
Dept: PHARMACY | Facility: CLINIC | Age: 48
End: 2018-01-08

## 2018-03-03 ENCOUNTER — LAB VISIT (OUTPATIENT)
Dept: LAB | Facility: HOSPITAL | Age: 48
End: 2018-03-03
Attending: INTERNAL MEDICINE
Payer: MEDICARE

## 2018-03-03 DIAGNOSIS — Z94.4 LIVER REPLACED BY TRANSPLANT: ICD-10-CM

## 2018-03-03 LAB
ALBUMIN SERPL BCP-MCNC: 3.3 G/DL
ALP SERPL-CCNC: 317 U/L
ALT SERPL W/O P-5'-P-CCNC: 36 U/L
ANION GAP SERPL CALC-SCNC: 9 MMOL/L
AST SERPL-CCNC: 51 U/L
BASOPHILS # BLD AUTO: 0.06 K/UL
BASOPHILS NFR BLD: 1.7 %
BILIRUB SERPL-MCNC: 1.2 MG/DL
BUN SERPL-MCNC: 9 MG/DL
CALCIUM SERPL-MCNC: 9.1 MG/DL
CHLORIDE SERPL-SCNC: 99 MMOL/L
CO2 SERPL-SCNC: 30 MMOL/L
CREAT SERPL-MCNC: 0.9 MG/DL
DIFFERENTIAL METHOD: ABNORMAL
EOSINOPHIL # BLD AUTO: 0.3 K/UL
EOSINOPHIL NFR BLD: 8.9 %
ERYTHROCYTE [DISTWIDTH] IN BLOOD BY AUTOMATED COUNT: 15.2 %
EST. GFR  (AFRICAN AMERICAN): >60 ML/MIN/1.73 M^2
EST. GFR  (NON AFRICAN AMERICAN): >60 ML/MIN/1.73 M^2
GLUCOSE SERPL-MCNC: 95 MG/DL
HCT VFR BLD AUTO: 35.9 %
HGB BLD-MCNC: 11.7 G/DL
IMM GRANULOCYTES # BLD AUTO: 0.01 K/UL
IMM GRANULOCYTES NFR BLD AUTO: 0.3 %
LYMPHOCYTES # BLD AUTO: 0.9 K/UL
LYMPHOCYTES NFR BLD: 24.7 %
MCH RBC QN AUTO: 27 PG
MCHC RBC AUTO-ENTMCNC: 32.6 G/DL
MCV RBC AUTO: 83 FL
MONOCYTES # BLD AUTO: 0.5 K/UL
MONOCYTES NFR BLD: 13.5 %
NEUTROPHILS # BLD AUTO: 1.8 K/UL
NEUTROPHILS NFR BLD: 50.9 %
NRBC BLD-RTO: 0 /100 WBC
PLATELET # BLD AUTO: 143 K/UL
PMV BLD AUTO: 12 FL
POTASSIUM SERPL-SCNC: 3.4 MMOL/L
PROT SERPL-MCNC: 7.1 G/DL
RBC # BLD AUTO: 4.33 M/UL
SODIUM SERPL-SCNC: 138 MMOL/L
WBC # BLD AUTO: 3.48 K/UL

## 2018-03-03 PROCEDURE — 80053 COMPREHEN METABOLIC PANEL: CPT

## 2018-03-03 PROCEDURE — 80197 ASSAY OF TACROLIMUS: CPT

## 2018-03-03 PROCEDURE — 36415 COLL VENOUS BLD VENIPUNCTURE: CPT | Mod: PO

## 2018-03-03 PROCEDURE — 85025 COMPLETE CBC W/AUTO DIFF WBC: CPT

## 2018-03-04 LAB — TACROLIMUS BLD-MCNC: 12.2 NG/ML

## 2018-03-05 DIAGNOSIS — Z94.4 LIVER REPLACED BY TRANSPLANT: ICD-10-CM

## 2018-03-05 NOTE — TELEPHONE ENCOUNTER
Labs reviewed by Dr. Nielsen  Called patient to have her decrease Prograh to 1 mg bid and repeat your labs on 04/07/18  Patient repeated and verbalized understanding.

## 2018-03-06 RX ORDER — TACROLIMUS 1 MG/1
1 CAPSULE ORAL EVERY 12 HOURS
Qty: 180 CAPSULE | Refills: 3 | Status: SHIPPED | OUTPATIENT
Start: 2018-03-06 | End: 2019-05-12 | Stop reason: SDUPTHER

## 2018-03-22 DIAGNOSIS — Z94.4 LIVER REPLACED BY TRANSPLANT: ICD-10-CM

## 2018-03-23 RX ORDER — ONDANSETRON HYDROCHLORIDE 8 MG/1
TABLET, FILM COATED ORAL
Qty: 30 TABLET | Refills: 0 | Status: ON HOLD | OUTPATIENT
Start: 2018-03-23 | End: 2020-09-04 | Stop reason: ALTCHOICE

## 2018-03-23 RX ORDER — MYCOPHENOLATE MOFETIL 250 MG/1
CAPSULE ORAL
Qty: 120 CAPSULE | Refills: 0 | Status: SHIPPED | OUTPATIENT
Start: 2018-03-23 | End: 2018-10-29 | Stop reason: SDUPTHER

## 2018-04-07 ENCOUNTER — LAB VISIT (OUTPATIENT)
Dept: LAB | Facility: HOSPITAL | Age: 48
End: 2018-04-07
Attending: INTERNAL MEDICINE
Payer: MEDICARE

## 2018-04-07 DIAGNOSIS — Z94.4 LIVER REPLACED BY TRANSPLANT: ICD-10-CM

## 2018-04-07 DIAGNOSIS — E03.9 MYXEDEMA HEART DISEASE: Primary | ICD-10-CM

## 2018-04-07 DIAGNOSIS — I51.9 MYXEDEMA HEART DISEASE: Primary | ICD-10-CM

## 2018-04-07 DIAGNOSIS — E55.9 AVITAMINOSIS D: ICD-10-CM

## 2018-04-07 LAB
25(OH)D3+25(OH)D2 SERPL-MCNC: 20 NG/ML
ALBUMIN SERPL BCP-MCNC: 3.2 G/DL
ALP SERPL-CCNC: 346 U/L
ALT SERPL W/O P-5'-P-CCNC: 34 U/L
ANION GAP SERPL CALC-SCNC: 10 MMOL/L
AST SERPL-CCNC: 49 U/L
BASOPHILS # BLD AUTO: 0.07 K/UL
BASOPHILS NFR BLD: 1.7 %
BILIRUB SERPL-MCNC: 0.8 MG/DL
BUN SERPL-MCNC: 11 MG/DL
CALCIUM SERPL-MCNC: 9.4 MG/DL
CHLORIDE SERPL-SCNC: 104 MMOL/L
CO2 SERPL-SCNC: 29 MMOL/L
CREAT SERPL-MCNC: 0.9 MG/DL
DIFFERENTIAL METHOD: ABNORMAL
EOSINOPHIL # BLD AUTO: 0.5 K/UL
EOSINOPHIL NFR BLD: 11.5 %
ERYTHROCYTE [DISTWIDTH] IN BLOOD BY AUTOMATED COUNT: 14.4 %
EST. GFR  (AFRICAN AMERICAN): >60 ML/MIN/1.73 M^2
EST. GFR  (NON AFRICAN AMERICAN): >60 ML/MIN/1.73 M^2
GGT SERPL-CCNC: 732 U/L
GLUCOSE SERPL-MCNC: 89 MG/DL
HCT VFR BLD AUTO: 37 %
HGB BLD-MCNC: 12 G/DL
IMM GRANULOCYTES # BLD AUTO: 0.01 K/UL
IMM GRANULOCYTES NFR BLD AUTO: 0.2 %
LYMPHOCYTES # BLD AUTO: 0.9 K/UL
LYMPHOCYTES NFR BLD: 21.8 %
MCH RBC QN AUTO: 26.8 PG
MCHC RBC AUTO-ENTMCNC: 32.4 G/DL
MCV RBC AUTO: 83 FL
MONOCYTES # BLD AUTO: 0.5 K/UL
MONOCYTES NFR BLD: 11 %
NEUTROPHILS # BLD AUTO: 2.3 K/UL
NEUTROPHILS NFR BLD: 53.8 %
NRBC BLD-RTO: 0 /100 WBC
PLATELET # BLD AUTO: 152 K/UL
PMV BLD AUTO: 11.1 FL
POTASSIUM SERPL-SCNC: 3.3 MMOL/L
PROT SERPL-MCNC: 7 G/DL
RBC # BLD AUTO: 4.47 M/UL
SODIUM SERPL-SCNC: 143 MMOL/L
T4 FREE SERPL-MCNC: 0.94 NG/DL
TSH SERPL DL<=0.005 MIU/L-ACNC: 2.03 UIU/ML
WBC # BLD AUTO: 4.18 K/UL

## 2018-04-07 PROCEDURE — 82306 VITAMIN D 25 HYDROXY: CPT

## 2018-04-07 PROCEDURE — 82977 ASSAY OF GGT: CPT

## 2018-04-07 PROCEDURE — 36415 COLL VENOUS BLD VENIPUNCTURE: CPT | Mod: PO

## 2018-04-07 PROCEDURE — 85025 COMPLETE CBC W/AUTO DIFF WBC: CPT

## 2018-04-07 PROCEDURE — 80053 COMPREHEN METABOLIC PANEL: CPT

## 2018-04-07 PROCEDURE — 84439 ASSAY OF FREE THYROXINE: CPT

## 2018-04-07 PROCEDURE — 84443 ASSAY THYROID STIM HORMONE: CPT

## 2018-04-07 PROCEDURE — 80197 ASSAY OF TACROLIMUS: CPT

## 2018-04-08 LAB — TACROLIMUS BLD-MCNC: 7.3 NG/ML

## 2018-04-09 ENCOUNTER — TELEPHONE (OUTPATIENT)
Dept: TRANSPLANT | Facility: CLINIC | Age: 48
End: 2018-04-09

## 2018-04-09 DIAGNOSIS — E87.6 HYPOKALEMIA: Primary | ICD-10-CM

## 2018-04-09 NOTE — TELEPHONE ENCOUNTER
----- Message from Jaya Nielsen MD sent at 4/9/2018  2:55 PM CDT -----  Can we start on KCl 20 meq daily. Thanks  ----- Message -----  From: Maude Chau RN  Sent: 4/9/2018   2:17 PM  To: Jaya Nielsen MD    No she has not been on it for some time.  ----- Message -----  From: Jaya Nielsen MD  Sent: 4/9/2018   8:30 AM  To: [facility] Post-Liver Transplant Clinical    Is she taking 40 meq KCl daily?

## 2018-04-09 NOTE — TELEPHONE ENCOUNTER
----- Message from Jaya Nielsen MD sent at 4/9/2018  8:30 AM CDT -----  Is she taking 40 meq KCl daily?

## 2018-04-10 RX ORDER — POTASSIUM CHLORIDE 750 MG/1
20 CAPSULE, EXTENDED RELEASE ORAL DAILY
Qty: 60 CAPSULE | Refills: 5 | Status: SHIPPED | OUTPATIENT
Start: 2018-04-10 | End: 2018-10-01 | Stop reason: SDUPTHER

## 2018-04-17 ENCOUNTER — PATIENT MESSAGE (OUTPATIENT)
Dept: TRANSPLANT | Facility: CLINIC | Age: 48
End: 2018-04-17

## 2018-04-17 ENCOUNTER — TELEPHONE (OUTPATIENT)
Dept: TRANSPLANT | Facility: CLINIC | Age: 48
End: 2018-04-17

## 2018-04-17 DIAGNOSIS — E87.6 HYPOKALEMIA: Primary | ICD-10-CM

## 2018-04-17 NOTE — TELEPHONE ENCOUNTER
Called patient because she was suppose to repeat her potassium on Saturday, left voicemail and sent message via portal

## 2018-04-19 ENCOUNTER — LAB VISIT (OUTPATIENT)
Dept: LAB | Facility: HOSPITAL | Age: 48
End: 2018-04-19
Attending: INTERNAL MEDICINE
Payer: MEDICARE

## 2018-04-19 DIAGNOSIS — E87.6 HYPOKALEMIA: ICD-10-CM

## 2018-04-19 LAB
ANION GAP SERPL CALC-SCNC: 10 MMOL/L
BUN SERPL-MCNC: 12 MG/DL
CALCIUM SERPL-MCNC: 9.4 MG/DL
CHLORIDE SERPL-SCNC: 104 MMOL/L
CO2 SERPL-SCNC: 22 MMOL/L
CREAT SERPL-MCNC: 0.9 MG/DL
EST. GFR  (AFRICAN AMERICAN): >60 ML/MIN/1.73 M^2
EST. GFR  (NON AFRICAN AMERICAN): >60 ML/MIN/1.73 M^2
GLUCOSE SERPL-MCNC: 103 MG/DL
POTASSIUM SERPL-SCNC: 3.5 MMOL/L
SODIUM SERPL-SCNC: 136 MMOL/L

## 2018-04-19 PROCEDURE — 36415 COLL VENOUS BLD VENIPUNCTURE: CPT | Mod: PO

## 2018-04-19 PROCEDURE — 80048 BASIC METABOLIC PNL TOTAL CA: CPT

## 2018-04-20 ENCOUNTER — TELEPHONE (OUTPATIENT)
Dept: TRANSPLANT | Facility: CLINIC | Age: 48
End: 2018-04-20

## 2018-04-20 NOTE — TELEPHONE ENCOUNTER
Labs reviewed by Dr. Nielsen  Sent patient a message Potassium was better with supplement, continue the supplement will send letter for next labs.

## 2018-04-20 NOTE — TELEPHONE ENCOUNTER
----- Message from Jaya Nielsen MD sent at 4/20/2018 12:52 PM CDT -----  Results reviewed. Please advise labs are stable.

## 2018-05-03 DIAGNOSIS — Z94.4 LIVER REPLACED BY TRANSPLANT: Primary | ICD-10-CM

## 2018-05-14 ENCOUNTER — LAB VISIT (OUTPATIENT)
Dept: LAB | Facility: HOSPITAL | Age: 48
End: 2018-05-14
Attending: INTERNAL MEDICINE
Payer: MEDICARE

## 2018-05-14 DIAGNOSIS — Z94.4 LIVER REPLACED BY TRANSPLANT: ICD-10-CM

## 2018-05-14 LAB
ALBUMIN SERPL BCP-MCNC: 3.1 G/DL
ALP SERPL-CCNC: 375 U/L
ALT SERPL W/O P-5'-P-CCNC: 88 U/L
ANION GAP SERPL CALC-SCNC: 8 MMOL/L
AST SERPL-CCNC: 130 U/L
BASOPHILS # BLD AUTO: 0.04 K/UL
BASOPHILS NFR BLD: 0.6 %
BILIRUB SERPL-MCNC: 0.7 MG/DL
BUN SERPL-MCNC: 15 MG/DL
CALCIUM SERPL-MCNC: 9.4 MG/DL
CHLORIDE SERPL-SCNC: 104 MMOL/L
CO2 SERPL-SCNC: 24 MMOL/L
CREAT SERPL-MCNC: 0.8 MG/DL
DIFFERENTIAL METHOD: ABNORMAL
EOSINOPHIL # BLD AUTO: 0.1 K/UL
EOSINOPHIL NFR BLD: 1.3 %
ERYTHROCYTE [DISTWIDTH] IN BLOOD BY AUTOMATED COUNT: 15.1 %
EST. GFR  (AFRICAN AMERICAN): >60 ML/MIN/1.73 M^2
EST. GFR  (NON AFRICAN AMERICAN): >60 ML/MIN/1.73 M^2
GLUCOSE SERPL-MCNC: 103 MG/DL
HCT VFR BLD AUTO: 35.8 %
HGB BLD-MCNC: 11.3 G/DL
IMM GRANULOCYTES # BLD AUTO: 0.02 K/UL
IMM GRANULOCYTES NFR BLD AUTO: 0.3 %
LYMPHOCYTES # BLD AUTO: 0.5 K/UL
LYMPHOCYTES NFR BLD: 7.6 %
MCH RBC QN AUTO: 26.8 PG
MCHC RBC AUTO-ENTMCNC: 31.6 G/DL
MCV RBC AUTO: 85 FL
MONOCYTES # BLD AUTO: 0.7 K/UL
MONOCYTES NFR BLD: 10.7 %
NEUTROPHILS # BLD AUTO: 5.3 K/UL
NEUTROPHILS NFR BLD: 79.5 %
NRBC BLD-RTO: 0 /100 WBC
PLATELET # BLD AUTO: 116 K/UL
PMV BLD AUTO: 12.9 FL
POTASSIUM SERPL-SCNC: 4.4 MMOL/L
PROT SERPL-MCNC: 6.6 G/DL
RBC # BLD AUTO: 4.22 M/UL
SODIUM SERPL-SCNC: 136 MMOL/L
WBC # BLD AUTO: 6.72 K/UL

## 2018-05-14 PROCEDURE — 80197 ASSAY OF TACROLIMUS: CPT

## 2018-05-14 PROCEDURE — 85025 COMPLETE CBC W/AUTO DIFF WBC: CPT

## 2018-05-14 PROCEDURE — 36415 COLL VENOUS BLD VENIPUNCTURE: CPT | Mod: PO

## 2018-05-14 PROCEDURE — 80053 COMPREHEN METABOLIC PANEL: CPT

## 2018-05-15 LAB — TACROLIMUS BLD-MCNC: 2.6 NG/ML

## 2018-06-06 ENCOUNTER — CLINICAL SUPPORT (OUTPATIENT)
Dept: AUDIOLOGY | Facility: CLINIC | Age: 48
End: 2018-06-06
Payer: MEDICARE

## 2018-06-06 ENCOUNTER — OFFICE VISIT (OUTPATIENT)
Dept: OTOLARYNGOLOGY | Facility: CLINIC | Age: 48
End: 2018-06-06
Payer: MEDICARE

## 2018-06-06 VITALS — BODY MASS INDEX: 30.55 KG/M2 | HEIGHT: 60 IN | WEIGHT: 155.63 LBS

## 2018-06-06 DIAGNOSIS — H90.A22 SENSORINEURAL HEARING LOSS (SNHL) OF LEFT EAR WITH RESTRICTED HEARING OF RIGHT EAR: ICD-10-CM

## 2018-06-06 DIAGNOSIS — H90.A31 MIXED CONDUCTIVE AND SENSORINEURAL HEARING LOSS OF RIGHT EAR WITH RESTRICTED HEARING OF LEFT EAR: Primary | ICD-10-CM

## 2018-06-06 PROCEDURE — 92557 COMPREHENSIVE HEARING TEST: CPT | Mod: S$GLB,,, | Performed by: AUDIOLOGIST

## 2018-06-06 PROCEDURE — 99999 PR PBB SHADOW E&M-EST. PATIENT-LVL II: CPT | Mod: PBBFAC,,, | Performed by: OTOLARYNGOLOGY

## 2018-06-06 PROCEDURE — 99204 OFFICE O/P NEW MOD 45 MIN: CPT | Mod: S$GLB,,, | Performed by: OTOLARYNGOLOGY

## 2018-06-06 PROCEDURE — 3008F BODY MASS INDEX DOCD: CPT | Mod: S$GLB,,, | Performed by: OTOLARYNGOLOGY

## 2018-06-06 NOTE — PROGRESS NOTES
Subjective:       Patient ID: Elda Hernandez is a 48 y.o. female.    Chief Complaint: Right HL    HPI: Hx pf R PORP/ No malleus /x2.    Has liver transplant.    Prog HL.    Past Medical History: Patient has a past medical history of Arnold-Chiari malformation; Depression; Esophageal stricture; Essential tremor; Hypertension; Left bundle branch block; Migraine without aura; MVP (mitral valve prolapse); Osteoporosis; Recurrent urinary tract infection; Seizures; Shingles (2007); SIADH (syndrome of inappropriate ADH production); Squamous cell carcinoma (10/2014); Tricuspid valve prolapse; Urolithiasis; and Von Gierke disease.    Past Surgical History: Patient has a past surgical history that includes Total abdominal hysterectomy (3/31/1994); Ossicular reconstruction (10/4/1995); Liver transplant (9/23/2002); TONSILLECTOMY, ADENOIDECTOMY (1/21/2004); Thymectomy (5/2/2007); Appendectomy (6/22/2007); Colonoscopy (5/13/2008); Laminectomy (3/2001); and Craniotomy.    Social History: Patient reports that she has never smoked. She does not have any smokeless tobacco history on file. She reports that she does not drink alcohol or use drugs.    Family History: family history is not on file.    Medications:   Current Outpatient Prescriptions   Medication Sig    amitriptyline (ELAVIL) 75 MG tablet TK 1 T PO  QD    butalbital-acetaminophen-caffeine -40 mg (FIORICET, ESGIC) -40 mg per tablet TK 1 TO 2 T PO EVERY 4 HOURS PRN. DO NOT EXCEED 6 T PER DAY    calcium carbonate (OS-JASON) 600 mg (1,500 mg) Tab Take 600 mg by mouth 2 (two) times daily with meals.    clonazePAM (KLONOPIN) 0.5 MG tablet Take 0.5 mg by mouth 2 (two) times daily as needed for Anxiety.    demeclocycline (DECLOMYCIN) 150 MG Tab Take 150 mg by mouth every 12 (twelve) hours.    gabapentin (NEURONTIN) 100 MG capsule Take 300 mg by mouth 3 (three) times daily.     lactulose (CHRONULAC) 10 gram/15 mL solution TAKE 20 ML BY MOUTH TWICE DAILY     levothyroxine (SYNTHROID) 75 MCG tablet Take 75 mcg by mouth once daily.    lisinopril (PRINIVIL,ZESTRIL) 20 MG tablet Take 40 mg by mouth once daily.     multivitamin capsule Take 1 capsule by mouth once daily.    mycophenolate (CELLCEPT) 250 mg Cap TAKE 2 CAPSULES BY MOUTH TWICE DAILY    ondansetron (ZOFRAN) 8 MG tablet TAKE 1 TABLET(8 MG) BY MOUTH EVERY 8 HOURS AS NEEDED FOR NAUSEA    pantoprazole (PROTONIX) 40 MG tablet Take 1 tablet (40 mg total) by mouth 2 (two) times daily.    potassium chloride (MICRO-K) 10 MEQ CpSR Take 2 capsules (20 mEq total) by mouth once daily.    promethazine (PHENERGAN) 25 MG tablet Take 25 mg by mouth every 4 (four) hours as needed (if unrelieved by zofran).     saliva stimulant agents comb.3 (BIOTENE MOISTURIZING MOUTH) Spry by Mucous Membrane route daily as needed.    tacrolimus (PROGRAF) 1 MG Cap Take 1 capsule (1 mg total) by mouth every 12 (twelve) hours.    venlafaxine 225 mg TR24 TK 1 T PO QD WITH FOOD     No current facility-administered medications for this visit.        Allergies: Patient is allergic to codeine; lipitor [atorvastatin]; morphine; and zoloft [sertraline].    Review of Systems   Constitutional: Negative for activity change, appetite change, chills, diaphoresis, fatigue, fever and unexpected weight change.   HENT: Positive for hearing loss. Negative for congestion, ear discharge, ear pain, facial swelling, nosebleeds, postnasal drip, rhinorrhea, sinus pressure, sneezing, sore throat, tinnitus, trouble swallowing and voice change.    Eyes: Negative for photophobia, pain, discharge, redness and visual disturbance.   Respiratory: Negative for cough, chest tightness, shortness of breath and wheezing.    Cardiovascular: Negative for chest pain and palpitations.   Gastrointestinal: Negative for abdominal pain, constipation, diarrhea and nausea.   Genitourinary: Negative for dysuria and frequency.   Musculoskeletal: Negative for arthralgias, back pain, gait  problem, joint swelling, myalgias, neck pain and neck stiffness.   Skin: Negative for color change, pallor and rash.   Neurological: Negative for dizziness, tremors, seizures, syncope, facial asymmetry, speech difficulty, weakness, light-headedness, numbness and headaches.   Hematological: Negative for adenopathy. Does not bruise/bleed easily.   Psychiatric/Behavioral: Negative for agitation, confusion, decreased concentration, dysphoric mood and sleep disturbance. The patient is not nervous/anxious and is not hyperactive.        Objective:      Physical Exam   Constitutional: She is oriented to person, place, and time. She appears well-developed and well-nourished. She is cooperative.  Non-toxic appearance. She does not have a sickly appearance. She does not appear ill. No distress.   HENT:   Head: Normocephalic and atraumatic. Not macrocephalic and not microcephalic. Head is without raccoon's eyes, without Miranda's sign, without abrasion, without contusion, without laceration, without right periorbital erythema and without left periorbital erythema. Hair is normal.   Right Ear: Ear canal normal. No lacerations. No drainage, swelling or tenderness. No foreign bodies. No mastoid tenderness. Tympanic membrane is not injected, not scarred, not perforated, not erythematous, not retracted and not bulging. Tympanic membrane mobility is normal. No middle ear effusion. No hemotympanum. Decreased hearing is noted.   Left Ear: Ear canal normal. No lacerations. No drainage, swelling or tenderness. No foreign bodies. No mastoid tenderness. Tympanic membrane is not injected, not scarred, not perforated, not erythematous, not retracted and not bulging. Tympanic membrane mobility is normal.  No middle ear effusion. No hemotympanum. Decreased hearing is noted.   Ears:    Nose: No mucosal edema, rhinorrhea, nose lacerations, sinus tenderness, nasal deformity, septal deviation or nasal septal hematoma. No epistaxis.  No foreign  bodies. Right sinus exhibits no maxillary sinus tenderness. Left sinus exhibits no maxillary sinus tenderness.   Eyes: Conjunctivae, EOM and lids are normal. Pupils are equal, round, and reactive to light.   Neck: Normal range of motion. Neck supple. No JVD present. No tracheal tenderness and no muscular tenderness present. No neck rigidity. No tracheal deviation, no edema, no erythema and normal range of motion present. No thyroid mass and no thyromegaly present.   Cardiovascular: Normal rate and regular rhythm.    Pulmonary/Chest: Effort normal. No accessory muscle usage or stridor. No apnea, no tachypnea and no bradypnea. No respiratory distress.   Abdominal: Soft. Normal appearance.   Musculoskeletal: Normal range of motion.   Lymphadenopathy:        Head (right side): No submental, no submandibular, no tonsillar, no preauricular and no posterior auricular adenopathy present.        Head (left side): No submental, no submandibular, no tonsillar, no preauricular and no posterior auricular adenopathy present.     She has no cervical adenopathy.        Right cervical: No superficial cervical, no deep cervical and no posterior cervical adenopathy present.       Left cervical: No superficial cervical, no deep cervical and no posterior cervical adenopathy present.   Neurological: She is alert and oriented to person, place, and time. She displays no atrophy and no tremor. No cranial nerve deficit or sensory deficit. She exhibits normal muscle tone. She displays no seizure activity.   Skin: Skin is warm, dry and intact. No abrasion, no bruising, no burn, no ecchymosis, no laceration, no lesion and no rash noted. She is not diaphoretic. No erythema. No pallor.   Psychiatric: She has a normal mood and affect. Her behavior is normal. Judgment and thought content normal. Her speech is not rapid and/or pressured and not slurred. Cognition and memory are normal. She is communicative.   Nursing note and vitals reviewed.               Assessment:       1. Asymmetrical hearing loss of both ears      Max CHL on R.    Poor Cand for conv HAE.  Plan:         Discussed R BAHA.    I have explained the risks , benefits and alternatives of the procedure in detail. This includes infection, bleeding, further loss of hearing,tinnitus, failure to improve, chorda symptoms, dizziness and facial nerve problems. All questions have been answered.  The patient desires to proceed..

## 2018-06-06 NOTE — PROGRESS NOTES
Ms. Hernandez was seen in the clinic today for a hearing evaluation.       Audiological testing revealed moderately severe-severe rising to moderate mixed hearing loss in the right ear and a mild rising to normal sensorineural hearing loss in the left ear . A speech reception threshold was obtained at 65 dBHL in the right ear and 25 dBHL in the left ear. Speech discrimination was 92% in the right ear and 100% in the left ear.    Recommendations:  1. Otologic evaluation  2. Annual hearing evaluation  3. Noise protection  4. Hearing aid consultation

## 2018-07-06 DIAGNOSIS — Z94.4 LIVER REPLACED BY TRANSPLANT: ICD-10-CM

## 2018-07-06 RX ORDER — MYCOPHENOLATE MOFETIL 250 MG/1
CAPSULE ORAL
Qty: 120 CAPSULE | Refills: 0 | Status: SHIPPED | OUTPATIENT
Start: 2018-07-06 | End: 2018-09-11 | Stop reason: SDUPTHER

## 2018-07-18 ENCOUNTER — TELEPHONE (OUTPATIENT)
Dept: TRANSPLANT | Facility: CLINIC | Age: 48
End: 2018-07-18

## 2018-07-18 NOTE — TELEPHONE ENCOUNTER
Called patient to reschedule labs, left voicemail and will send portal message also for patient to repeat lab son 07/28/18

## 2018-07-28 ENCOUNTER — LAB VISIT (OUTPATIENT)
Dept: LAB | Facility: HOSPITAL | Age: 48
End: 2018-07-28
Attending: INTERNAL MEDICINE
Payer: MEDICARE

## 2018-07-28 DIAGNOSIS — Z94.4 LIVER REPLACED BY TRANSPLANT: ICD-10-CM

## 2018-07-28 LAB
ALBUMIN SERPL BCP-MCNC: 3.3 G/DL
ALP SERPL-CCNC: 462 U/L
ALT SERPL W/O P-5'-P-CCNC: 75 U/L
ANION GAP SERPL CALC-SCNC: 9 MMOL/L
AST SERPL-CCNC: 85 U/L
BASOPHILS # BLD AUTO: 0.04 K/UL
BASOPHILS NFR BLD: 1.1 %
BILIRUB SERPL-MCNC: 0.7 MG/DL
BUN SERPL-MCNC: 12 MG/DL
CALCIUM SERPL-MCNC: 9.4 MG/DL
CHLORIDE SERPL-SCNC: 103 MMOL/L
CO2 SERPL-SCNC: 23 MMOL/L
CREAT SERPL-MCNC: 0.9 MG/DL
DIFFERENTIAL METHOD: ABNORMAL
EOSINOPHIL # BLD AUTO: 0.2 K/UL
EOSINOPHIL NFR BLD: 5.9 %
ERYTHROCYTE [DISTWIDTH] IN BLOOD BY AUTOMATED COUNT: 15.2 %
EST. GFR  (AFRICAN AMERICAN): >60 ML/MIN/1.73 M^2
EST. GFR  (NON AFRICAN AMERICAN): >60 ML/MIN/1.73 M^2
GLUCOSE SERPL-MCNC: 89 MG/DL
HCT VFR BLD AUTO: 35.7 %
HGB BLD-MCNC: 11.4 G/DL
IMM GRANULOCYTES # BLD AUTO: 0.01 K/UL
IMM GRANULOCYTES NFR BLD AUTO: 0.3 %
LYMPHOCYTES # BLD AUTO: 0.9 K/UL
LYMPHOCYTES NFR BLD: 24 %
MCH RBC QN AUTO: 26.5 PG
MCHC RBC AUTO-ENTMCNC: 31.9 G/DL
MCV RBC AUTO: 83 FL
MONOCYTES # BLD AUTO: 0.5 K/UL
MONOCYTES NFR BLD: 14 %
NEUTROPHILS # BLD AUTO: 2 K/UL
NEUTROPHILS NFR BLD: 54.7 %
NRBC BLD-RTO: 0 /100 WBC
PLATELET # BLD AUTO: 145 K/UL
PMV BLD AUTO: 12.9 FL
POTASSIUM SERPL-SCNC: 4.3 MMOL/L
PROT SERPL-MCNC: 6.9 G/DL
RBC # BLD AUTO: 4.31 M/UL
SODIUM SERPL-SCNC: 135 MMOL/L
WBC # BLD AUTO: 3.71 K/UL

## 2018-07-28 PROCEDURE — 80053 COMPREHEN METABOLIC PANEL: CPT

## 2018-07-28 PROCEDURE — 85025 COMPLETE CBC W/AUTO DIFF WBC: CPT

## 2018-07-28 PROCEDURE — 80197 ASSAY OF TACROLIMUS: CPT

## 2018-07-28 PROCEDURE — 36415 COLL VENOUS BLD VENIPUNCTURE: CPT | Mod: PO

## 2018-07-29 LAB — TACROLIMUS BLD-MCNC: 11.4 NG/ML

## 2018-08-01 ENCOUNTER — TELEPHONE (OUTPATIENT)
Dept: TRANSPLANT | Facility: CLINIC | Age: 48
End: 2018-08-01

## 2018-08-01 NOTE — TELEPHONE ENCOUNTER
Reviewed labs with Dr. Nielsen and the labs are stable.  Notified patient via letter that their labs are stable and to repeat labs on 10/27/18.

## 2018-08-01 NOTE — LETTER
August 1, 2018    Elda Andreea Hernandez  [address] [facility] [address]          Dear Elda Hernandez:  MRN: [id number]    Your lab results were stable.  There are no medicine changes.  Please have your labs drawn again on 10/27/18.  It is also time for your routine clinic with Dr. Nielsen, we will set up an appointment for you.     If you cannot have your labs drawn on the scheduled date, it is your responsibility to call the transplant department to reschedule.  To reschedule or make an appointment, please as to speak to or leave a message for my assistant, Joyce Mg, at [phone].  When leaving a message for Haritha Cook, or myself, we ask that you leave a brief message regarding your request.    Sincerely,  Millie Mcguire, MSN, RNBC, CCTN      Your Liver Transplant Coordinator    [facility]  [address] [place] [id number] [phone]

## 2018-08-01 NOTE — TELEPHONE ENCOUNTER
----- Message from Jaya Nielsen MD sent at 7/30/2018  7:07 AM CDT -----  Results reviewed. Please advise labs are stable.

## 2018-09-05 ENCOUNTER — TELEPHONE (OUTPATIENT)
Dept: TRANSPLANT | Facility: CLINIC | Age: 48
End: 2018-09-05

## 2018-09-08 ENCOUNTER — PATIENT MESSAGE (OUTPATIENT)
Dept: TRANSPLANT | Facility: CLINIC | Age: 48
End: 2018-09-08

## 2018-09-11 ENCOUNTER — PATIENT MESSAGE (OUTPATIENT)
Dept: TRANSPLANT | Facility: CLINIC | Age: 48
End: 2018-09-11

## 2018-09-11 DIAGNOSIS — Z94.4 LIVER REPLACED BY TRANSPLANT: ICD-10-CM

## 2018-09-12 RX ORDER — MYCOPHENOLATE MOFETIL 250 MG/1
500 CAPSULE ORAL 2 TIMES DAILY
Qty: 120 CAPSULE | Refills: 6 | Status: SHIPPED | OUTPATIENT
Start: 2018-09-12 | End: 2018-10-01 | Stop reason: SDUPTHER

## 2018-09-24 ENCOUNTER — PATIENT MESSAGE (OUTPATIENT)
Dept: TRANSPLANT | Facility: CLINIC | Age: 48
End: 2018-09-24

## 2018-10-01 ENCOUNTER — LAB VISIT (OUTPATIENT)
Dept: LAB | Facility: HOSPITAL | Age: 48
End: 2018-10-01
Payer: MEDICARE

## 2018-10-01 ENCOUNTER — OFFICE VISIT (OUTPATIENT)
Dept: TRANSPLANT | Facility: CLINIC | Age: 48
End: 2018-10-01
Payer: MEDICARE

## 2018-10-01 ENCOUNTER — PROCEDURE VISIT (OUTPATIENT)
Dept: HEPATOLOGY | Facility: CLINIC | Age: 48
End: 2018-10-01
Attending: NURSE PRACTITIONER
Payer: MEDICARE

## 2018-10-01 VITALS
BODY MASS INDEX: 30.23 KG/M2 | HEIGHT: 59 IN | TEMPERATURE: 98 F | HEART RATE: 89 BPM | DIASTOLIC BLOOD PRESSURE: 83 MMHG | RESPIRATION RATE: 17 BRPM | OXYGEN SATURATION: 99 % | WEIGHT: 149.94 LBS | SYSTOLIC BLOOD PRESSURE: 156 MMHG

## 2018-10-01 DIAGNOSIS — I10 ESSENTIAL HYPERTENSION: Chronic | ICD-10-CM

## 2018-10-01 DIAGNOSIS — Z94.4 S/P LIVER TRANSPLANT: Chronic | ICD-10-CM

## 2018-10-01 DIAGNOSIS — T86.41 CHRONIC REJECTION OF LIVER TRANSPLANT: ICD-10-CM

## 2018-10-01 DIAGNOSIS — Z94.4 S/P LIVER TRANSPLANT: Primary | Chronic | ICD-10-CM

## 2018-10-01 DIAGNOSIS — T86.49 BILIARY OBSTRUCTION OF TRANSPLANTED LIVER: ICD-10-CM

## 2018-10-01 DIAGNOSIS — G40.909 NONINTRACTABLE EPILEPSY WITHOUT STATUS EPILEPTICUS, UNSPECIFIED EPILEPSY TYPE: ICD-10-CM

## 2018-10-01 DIAGNOSIS — M81.0 OSTEOPOROSIS, UNSPECIFIED OSTEOPOROSIS TYPE, UNSPECIFIED PATHOLOGICAL FRACTURE PRESENCE: ICD-10-CM

## 2018-10-01 DIAGNOSIS — R74.8 ELEVATED LIVER ENZYMES: ICD-10-CM

## 2018-10-01 DIAGNOSIS — I34.0 NON-RHEUMATIC MITRAL REGURGITATION: ICD-10-CM

## 2018-10-01 DIAGNOSIS — D17.71 ANGIOLIPOMA OF KIDNEY: ICD-10-CM

## 2018-10-01 DIAGNOSIS — I44.7 LEFT BUNDLE BRANCH BLOCK: ICD-10-CM

## 2018-10-01 DIAGNOSIS — K74.00 LIVER FIBROSIS, TRANSPLANTED LIVER: ICD-10-CM

## 2018-10-01 DIAGNOSIS — E87.6 HYPOKALEMIA: ICD-10-CM

## 2018-10-01 DIAGNOSIS — I36.1 NON-RHEUMATIC TRICUSPID VALVE INSUFFICIENCY: ICD-10-CM

## 2018-10-01 DIAGNOSIS — Z29.89 PROPHYLACTIC IMMUNOTHERAPY: Chronic | ICD-10-CM

## 2018-10-01 DIAGNOSIS — R10.11 RUQ ABDOMINAL PAIN: ICD-10-CM

## 2018-10-01 DIAGNOSIS — K83.1 BILIARY OBSTRUCTION OF TRANSPLANTED LIVER: ICD-10-CM

## 2018-10-01 DIAGNOSIS — T86.49 LIVER FIBROSIS, TRANSPLANTED LIVER: ICD-10-CM

## 2018-10-01 DIAGNOSIS — K59.09 CHRONIC CONSTIPATION: ICD-10-CM

## 2018-10-01 DIAGNOSIS — E22.2 SIADH (SYNDROME OF INAPPROPRIATE ADH PRODUCTION): Chronic | ICD-10-CM

## 2018-10-01 LAB
ALBUMIN SERPL BCP-MCNC: 3.5 G/DL
ALP SERPL-CCNC: 304 U/L
ALT SERPL W/O P-5'-P-CCNC: 49 U/L
ANION GAP SERPL CALC-SCNC: 8 MMOL/L
AST SERPL-CCNC: 57 U/L
BILIRUB SERPL-MCNC: 0.8 MG/DL
BUN SERPL-MCNC: 18 MG/DL
CALCIUM SERPL-MCNC: 9 MG/DL
CHLORIDE SERPL-SCNC: 105 MMOL/L
CO2 SERPL-SCNC: 22 MMOL/L
CREAT SERPL-MCNC: 0.8 MG/DL
EST. GFR  (AFRICAN AMERICAN): >60 ML/MIN/1.73 M^2
EST. GFR  (NON AFRICAN AMERICAN): >60 ML/MIN/1.73 M^2
GLUCOSE SERPL-MCNC: 76 MG/DL
POTASSIUM SERPL-SCNC: 4.1 MMOL/L
PROT SERPL-MCNC: 6.9 G/DL
SODIUM SERPL-SCNC: 135 MMOL/L

## 2018-10-01 PROCEDURE — 80053 COMPREHEN METABOLIC PANEL: CPT

## 2018-10-01 PROCEDURE — 3008F BODY MASS INDEX DOCD: CPT | Mod: ,,, | Performed by: NURSE PRACTITIONER

## 2018-10-01 PROCEDURE — 99999 PR PBB SHADOW E&M-EST. PATIENT-LVL V: CPT | Mod: PBBFAC,,, | Performed by: NURSE PRACTITIONER

## 2018-10-01 PROCEDURE — 99215 OFFICE O/P EST HI 40 MIN: CPT | Mod: S$PBB,,, | Performed by: NURSE PRACTITIONER

## 2018-10-01 PROCEDURE — 91200 LIVER ELASTOGRAPHY: CPT | Mod: 26,S$PBB,, | Performed by: NURSE PRACTITIONER

## 2018-10-01 PROCEDURE — 36415 COLL VENOUS BLD VENIPUNCTURE: CPT

## 2018-10-01 PROCEDURE — 3079F DIAST BP 80-89 MM HG: CPT | Mod: ,,, | Performed by: NURSE PRACTITIONER

## 2018-10-01 PROCEDURE — 99215 OFFICE O/P EST HI 40 MIN: CPT | Mod: PBBFAC,25 | Performed by: NURSE PRACTITIONER

## 2018-10-01 PROCEDURE — 91200 LIVER ELASTOGRAPHY: CPT | Mod: PBBFAC | Performed by: NURSE PRACTITIONER

## 2018-10-01 PROCEDURE — 3077F SYST BP >= 140 MM HG: CPT | Mod: ,,, | Performed by: NURSE PRACTITIONER

## 2018-10-01 RX ORDER — ERGOCALCIFEROL 1.25 MG/1
50000 CAPSULE ORAL
Status: ON HOLD | COMMUNITY
End: 2020-09-04 | Stop reason: ALTCHOICE

## 2018-10-01 NOTE — PATIENT INSTRUCTIONS
1. GI referral for chronic constipation  2. Endocrine appt for SIADH and osteoporosis  3. 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 Neurology referral  5. Cardiology appt   6. Dermatology referral     EDUCATION:  -- You have an increased risk of infection (bacterial, viral, or fungal infections) because you are on immunosuppression medications  -- schedule colonoscopy, and/or well women screenings as recommended by your primary care provider   -- Higher chance of high blood pressure, diabetes, high cholesterol and weight gain post transplant, so recommend to follow closely with your primary doctor to monitor for these conditions or worsening of these conditions. At higher risk for diabetes with rejection medications post transplant so should be screened for diabetes at least yearly by your primary provider  -- increased risk of cancer, including skin cancers. Avoid sun, wears hat when outside, use sunscreen with at least 15 spf at all times when outside, schedule yearly dermatology skin checks (at higher risk for skin cancer post transplant)  -- increased risk of kidney disease with the use of immunosuppression medications so will monitor closely every 3 months  -- DXA (bone) scan to assess for bone thinning (osteoporosis), next due now

## 2018-10-01 NOTE — Clinical Note
Can you please schedule pt for DXA scan and post liver US in next couple of weeks (c/o RUQ pain x3 months) on same day here if possible? Also add INR to next labs Also, here are rest of notes from my visit:1. RTC yearly, labs per protocol 2. Fibroscan today (done) and yearly 3. Endocrine referral for SIADH and osteoporosis4. GI referral for chronic constipation 5. Derm referral for annual skin check 6. Referral for the following per pt request: neurology (epilepsy), urology referral (h/o angiolipoma on kidney), cardiology appt (left BBB, mitral and tricuspid regurg)Quyen

## 2018-10-01 NOTE — PROCEDURES
Fibroscan Procedure     Name: Elda Hernandez  Date of Procedure : 10/01/2018   :: Nlel Gordon NP  Diagnosis: Transplant    Probe: M    Fibroscan readin.8 KPa    Fibrosis:F3     CAP readin dB/m    Steatosis: :<S1

## 2018-10-01 NOTE — PROGRESS NOTES
" Transplant Hepatology  Liver Transplant Recipient Follow-up    Transplant Date: 9/23/2002  UNOS Native Liver Dx: METDIS: Glycogen Storage Disease Type I (GSD-I)    Elda Doran is here for follow up of her liver transplant.    ORGAN: LIVER  Whole or Partial: whole liver  Donor Type:   CDC High Risk:   Donor CMV Status: Positive  Donor HCV Status: Negative  Donor HBcAb: Negative  Donor HBV ROSS:   Donor HCV ROSS:   Biliary Anastomosis:   Arterial Anatomy:   IVC reconstruction:   Portal vein status:     SEROLOGIES R/D: CMV -/+ HCV -/- HBcAb -/-     She has had the following complications since transplant: chronic rejection, biliary stricture and ductopenic rejection. The noted complications are well controlled.    Liver biopsy in 2015 with features of chronic and acute rejection. Treated with steroid pulse and taper. Last ERCP in 2015 with sphincterotomy; biliary stent removed.     Subjective:     Interval History: Elda Doran was last seen on 1/2018 by EMILIANA Duff NP. Currently, she is doing adequately. Current complaints include     1. Chronic constipation: x "years", not followed by GI, taking Miralax and Lactulose prn, sometimes resulting in diarrhea.   2. Recent diarrhea, pale colored stools, body aches x4 days last week. Denies fever, abd pain, chills during illness. Now diarrhea, body aches resolved, pt unable to assess stools as has not had BM x2 days (normal for her)  3. H/o mitral and tricuspid regurg, left BBB - requesting cardiology referral to establish care here  4. Reports RUQ intermittent abd pain for past 3-4 months, last imaging 2015  5. SIADH and osteoporosis - not followed by endocrine      Hepatologist: Dr. Nielsen    -- Past liver biopsies: Biopsy 2015  LIVER, POST TRANSPLANT 2002 (NEEDLE BIOPSY):  -Chronic ductopenic rejection  -Portal lymphocytes and small vessel endothelialitis, pending levels  -Bile duct proliferation with duct neutrophils, see comment    Currently followed by neurology for epilepsy. " "No recent falls.     ERCP 04/2015- no stent placed.    Current Immunosuppression: currently taking Prograf 1/1, Cellcept 500 mg BID  Previous chart notes to increase Prograf to 2/2 on 9/5/18 but pt did not receive message    Liver allograft function: liver enzymes, alk phos chronically elevated, stable on last labs per Dr. Nielsen    Lab Results   Component Value Date    ALT 75 (H) 07/28/2018    AST 85 (H) 07/28/2018    ALKPHOS 462 (H) 07/28/2018    BILITOT 0.7 07/28/2018    ALBUMIN 3.3 (L) 07/28/2018    INR 1.0 12/19/2015     (L) 07/28/2018    CREATININE 0.9 07/28/2018    TACROLIMUS 11.4 07/28/2018       Kidney function - WNL  Denies recurrent infections, cancer, any hospitalizations since last visit     Uncontrolled HTN - does not currently have PCP    Has not been screened for DM     Last Fibroscan: none, will do today     HEALTH MAINTENANCE:  1. Last DXA scan: unknown, pt reports osteoporosis "since 8 years old", will repeat   2. Last dermatology skin assessment - overdue, needs yearly  3. A1c - to obtain with PCP  4. Pap smear/ mammogram: per GYN    Review of Systems   Constitutional: Negative for activity change, appetite change, chills, diaphoresis, fatigue, fever and unexpected weight change.   HENT: Negative.  Negative for congestion, sinus pressure and sore throat.    Eyes: Negative.    Respiratory: Negative for cough and shortness of breath.    Cardiovascular: Negative for chest pain and leg swelling.   Gastrointestinal: Positive for abdominal pain (intermittent RUQ pain ) and constipation (chronic). Negative for abdominal distention, diarrhea, nausea and vomiting.   Endocrine: Negative.    Genitourinary: Negative for dysuria, flank pain and urgency.   Musculoskeletal: Negative.    Skin: Negative for rash and wound.   Allergic/Immunologic: Positive for immunocompromised state.   Neurological: Negative for dizziness, weakness and headaches.   Hematological: Negative for adenopathy. Does not " bruise/bleed easily.   Psychiatric/Behavioral: Negative.        Objective:     Physical Exam   Constitutional: She is oriented to person, place, and time. She appears well-developed and well-nourished.   HENT:   Head: Normocephalic and atraumatic.   Eyes: EOM are normal. Pupils are equal, round, and reactive to light. No scleral icterus.   Neck: Normal range of motion. Neck supple.   Cardiovascular: Normal rate, regular rhythm and normal heart sounds.   No murmur heard.  Pulmonary/Chest: Effort normal and breath sounds normal. She has no wheezes. She has no rales.   Abdominal: Soft. Bowel sounds are normal. She exhibits no distension and no mass. There is no tenderness. There is no rebound and no guarding.   + hepatomegaly   Musculoskeletal: Normal range of motion. She exhibits no edema or tenderness.   Lymphadenopathy:     She has no cervical adenopathy.   Neurological: She is alert and oriented to person, place, and time.   Skin: Skin is warm and dry. Capillary refill takes less than 2 seconds. No rash noted. No erythema.   Psychiatric: She has a normal mood and affect. Her behavior is normal.     Lab Results   Component Value Date    BILITOT 0.7 07/28/2018    AST 85 (H) 07/28/2018    ALT 75 (H) 07/28/2018    ALKPHOS 462 (H) 07/28/2018    CREATININE 0.9 07/28/2018    ALBUMIN 3.3 (L) 07/28/2018     Lab Results   Component Value Date    WBC 3.71 (L) 07/28/2018    HGB 11.4 (L) 07/28/2018    HCT 35.7 (L) 07/28/2018     (L) 07/28/2018     Lab Results   Component Value Date    TACROLIMUS 11.4 07/28/2018       Assessment/Plan:     1. 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 Chronic constipation    3. Osteoporosis, unspecified osteoporosis type, unspecified pathological fracture presence    4. Non-rheumatic mitral regurgitation    5. Non-rheumatic tricuspid valve insufficiency    6. Left bundle branch block    7. Angiolipoma of kidney    8. SIADH (syndrome of inappropriate ADH production)    9. RUQ  abdominal pain    10. Prophylactic immunotherapy    11. Essential hypertension    12. Nonintractable epilepsy without status epilepticus, unspecified epilepsy type    13. Elevated liver enzymes    14. Chronic rejection of liver transplant    15. Biliary obstruction of transplanted liver    16. Liver fibrosis, transplanted liver         1. 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 Transplant complicated by chronic and acute rejection (treated with steroids), ductopenic rejection. LFTs stable 7/2018 per Dr. Nielsen at this time.   -- Continue monitoring symptoms, labs, and drug levels for drug-related toxicity and side effects.  -- Continue with post transplant labs per protocol.     2. Prophylactic immunotherapy  -- Currently on tacro 1 mg BID and  mg BID   -- Last tacro level 11.4, history of chronic rejection. Did not increase to Prograf 2/2 as noted in chart note 9/5/18, cannot repeat prograf today as pt already took dose      3. Report of recent pale colored stools, intermittent RUQ pain  -- US  -- CMP today     4. History of biliary stricture of transplanted liver     5. Chronic rejection of transplanted organ, ductopenic rejection      6. Epilepsy  -- Following with Neurology     7. Health maintenance  -- yearly dermatology visit - needs to schedule   -- DXA scan - needs repeat   -- Diabetes screening with A1c  - to obtain through PCP  -- Fibroscan yearly - to be done today     8. HTN, uncontrolled at visit today  -- needs to establish care with PCP    9. Osteoporosis, pt reports h/o since childhood  -- continue Ca and rx Vitamin D  -- repeat DXA scan now   -- endocrine referral for chronic management    10. SIADH  -- endocrine referral     11. Pt report of Left BBB, mitral and tricuspid regurg  -- requesting referral to cardiology to transfer care to [facility]     12. Chronic constipation  -- alternating between miralax and lactulose  -- GI referral     13. Angiolipoma of kidney  -- pt  reports  -- previously followed by outside urologist per pt report, reports overdue for f/u, requesting referral to establish care at [facility]       PLAN:  1. CMP today (given recent report of pale stools), intermittent RUQ pain  2. Likely needs repeat prograf level, as did not increase Prograf dose 9/5/18 as recommended (cannot perform today as took dose already)  2. US for report of intermittent RUQ pain x3 months  3. RTC yearly, labs per protocol   4. Fibroscan today and yearly   5. DXA scan now   6. Endocrine referral for SIADH and osteoporosis  7. GI referral for chronic constipation   8. Derm referral for annual skin check   9. Referral for the following per pt request: neurology (epilepsy), urology referral (h/o angiolipoma on kidney), cardiology appt (left BBB, mitral and tricuspid regurg)    Note routed to post-liver transplant coordinator pool, Dr. Nielsen]    ADDENDUM: ADDENDUM: Fibroscan results received.   Fibroscan today = kPA = 12.8 = F3 fibrosis.  CAP score = 226 = <S1    Discussed fibroscan results with Dr. Nielsen, labs improved, no indication for biopsy at this time. Will obtain MELD labs with next labs in 3 weeks     ADDENDUM 10/3/18: Discussed with Dr. Nielsen, will start HCC screening (US and AFP) q6 months and will add INR to labs q6 months. No further interventions at this time. JAS Chau, Transplant coordinator updated       Nell Gordon NP       UNOS Patient Status  Functional Status: 100% - Normal, no complaints, no evidence of disease  Physical Capacity: No Limitations

## 2018-10-01 NOTE — LETTER
October 2, 2018        Jordana Woods  [address] [facility] [address]  Phone: [phone]  Fax: [phone]             [facility]  [address]  [facility] [address]  Phone: [phone]   Patient: Elda Hernandez   MR Number: [id number]   YOB: 1970   Date of Visit: 10/1/2018       Dear Dr. Jordana Woods    Thank you for referring Elda Hernandez to me for evaluation. Attached you will find relevant portions of my assessment and plan of care.    If you have questions, please do not hesitate to call me. I look forward to following Elda Hernandez along with you.    Sincerely,    Nell Gordon NP    Enclosure    If you would like to receive this communication electronically, please contact [email or web address] or [phone] to request [employer] Link access.    [employer] Link is a tool which provides read-only access to select patient information with whom you have a relationship. Its easy to use and provides real time access to review your patients record including encounter summaries, notes, results, and demographic information.    If you feel you have received this communication in error or would no longer like to receive these types of communications, please e-mail [email or web address]

## 2018-10-01 NOTE — Clinical Note
Jaya, I saw Ms. Hernandez today. She had some complaints of ermelinda colored stool last week with other viral symptoms, so did CMP today (results not back yet). Also, fibroscan showed F3.  Last biopsy in 2015 chronic ductopenic rejection, Portal lymphocytes, small vessel endothelialitis, and Bile duct proliferation with duct neutrophils.Would it be beneficial to re biopsy  Her?Thanks in Eileen

## 2018-10-02 ENCOUNTER — PATIENT MESSAGE (OUTPATIENT)
Dept: HEPATOLOGY | Facility: CLINIC | Age: 48
End: 2018-10-02

## 2018-10-02 ENCOUNTER — TELEPHONE (OUTPATIENT)
Dept: TRANSPLANT | Facility: CLINIC | Age: 48
End: 2018-10-02

## 2018-10-02 DIAGNOSIS — K74.00 LIVER FIBROSIS, TRANSPLANTED LIVER: Primary | ICD-10-CM

## 2018-10-02 DIAGNOSIS — T86.49 LIVER FIBROSIS, TRANSPLANTED LIVER: Primary | ICD-10-CM

## 2018-10-02 RX ORDER — POTASSIUM CHLORIDE 750 MG/1
TABLET, EXTENDED RELEASE ORAL
Qty: 60 TABLET | Refills: 0 | Status: SHIPPED | OUTPATIENT
Start: 2018-10-02 | End: 2020-06-01

## 2018-10-03 ENCOUNTER — HOSPITAL ENCOUNTER (OUTPATIENT)
Dept: RADIOLOGY | Facility: HOSPITAL | Age: 48
Discharge: HOME | End: 2018-10-03
Attending: NURSE PRACTITIONER
Payer: MEDICARE

## 2018-10-03 DIAGNOSIS — Z94.4 S/P LIVER TRANSPLANT: Chronic | ICD-10-CM

## 2018-10-03 DIAGNOSIS — Z94.4 LIVER REPLACED BY TRANSPLANT: Primary | ICD-10-CM

## 2018-10-03 PROCEDURE — 76705 ECHO EXAM OF ABDOMEN: CPT | Mod: 26,59,, | Performed by: RADIOLOGY

## 2018-10-03 PROCEDURE — 93975 VASCULAR STUDY: CPT | Mod: TC

## 2018-10-03 PROCEDURE — 93975 VASCULAR STUDY: CPT | Mod: 26,,, | Performed by: RADIOLOGY

## 2018-10-03 PROCEDURE — 76705 ECHO EXAM OF ABDOMEN: CPT | Mod: TC

## 2018-10-03 NOTE — TELEPHONE ENCOUNTER
Please arrange for pt to have HCC screening and f/u visits every 6 months.     Since fibroscan suggested F3 fibrosis, discussed with Dr. Nielsen and will start HCC screening with US and AFP every 6 months to be cautious.    Thanks

## 2018-10-03 NOTE — TELEPHONE ENCOUNTER
----- Message from Jaya Nielsen MD sent at 10/2/2018  2:36 PM CDT -----  Yes. I would do that with F3 fibrosis. Thx  ----- Message -----  From: Nell Gordon NP  Sent: 10/2/2018  11:11 AM  To: Jaya Nielsen MD    Do you think we should start HCC screening with US and AFP?     Nell    ----- Message -----  From: Jaya Nielsen MD  Sent: 10/1/2018   2:28 PM  To: Nell Gordon NP    I am not sure I would unless there has been a big change in her labs. Struggled convincing to get one last time. Thx  ----- Message -----  From: Nell Gordon NP  Sent: 10/1/2018   1:13 PM  To: MD Jaya York,     I saw Ms. Hernandez today. She had some complaints of ermelinda colored stool last week with other viral symptoms, so did CMP today (results not back yet). Also, fibroscan showed F3.  Last biopsy in 2015 chronic ductopenic rejection, Portal lymphocytes, small vessel endothelialitis, and Bile duct proliferation with duct neutrophils.  Would it be beneficial to re biopsy  Her?    Thanks in advance  Nell

## 2018-10-11 DIAGNOSIS — G40.309: Primary | ICD-10-CM

## 2018-10-12 ENCOUNTER — TELEPHONE (OUTPATIENT)
Dept: ENDOCRINOLOGY | Facility: CLINIC | Age: 48
End: 2018-10-12

## 2018-10-12 NOTE — TELEPHONE ENCOUNTER
----- Message from Jackie Adames sent at 10/12/2018  9:42 AM CDT -----  Contact: Lyla from [facility]  Name of Who is Calling: Lyla      What is the request in detail: Lyla is calling requesting to have patient seen on 11/16 a referral is in the system      Can the clinic reply by [facility]:       What Number to Call Back if not in [facility]: .[phone] (home)

## 2018-10-29 ENCOUNTER — PATIENT MESSAGE (OUTPATIENT)
Dept: TRANSPLANT | Facility: CLINIC | Age: 48
End: 2018-10-29

## 2018-10-29 ENCOUNTER — PATIENT MESSAGE (OUTPATIENT)
Dept: HEPATOLOGY | Facility: CLINIC | Age: 48
End: 2018-10-29

## 2018-10-29 DIAGNOSIS — Z94.4 LIVER REPLACED BY TRANSPLANT: ICD-10-CM

## 2018-10-30 RX ORDER — MYCOPHENOLATE MOFETIL 250 MG/1
500 CAPSULE ORAL 2 TIMES DAILY
Qty: 120 CAPSULE | Refills: 6 | Status: SHIPPED | OUTPATIENT
Start: 2018-10-30 | End: 2019-11-15 | Stop reason: SDUPTHER

## 2018-11-03 ENCOUNTER — LAB VISIT (OUTPATIENT)
Dept: LAB | Facility: HOSPITAL | Age: 48
End: 2018-11-03
Attending: INTERNAL MEDICINE
Payer: MEDICARE

## 2018-11-03 DIAGNOSIS — Z94.4 LIVER REPLACED BY TRANSPLANT: ICD-10-CM

## 2018-11-03 LAB
ALBUMIN SERPL BCP-MCNC: 3.5 G/DL
ALP SERPL-CCNC: 302 U/L
ALT SERPL W/O P-5'-P-CCNC: 67 U/L
ANION GAP SERPL CALC-SCNC: 9 MMOL/L
AST SERPL-CCNC: 75 U/L
BASOPHILS # BLD AUTO: 0.04 K/UL
BASOPHILS NFR BLD: 1.1 %
BILIRUB SERPL-MCNC: 0.7 MG/DL
BUN SERPL-MCNC: 33 MG/DL
CALCIUM SERPL-MCNC: 9.6 MG/DL
CHLORIDE SERPL-SCNC: 107 MMOL/L
CO2 SERPL-SCNC: 21 MMOL/L
CREAT SERPL-MCNC: 1.2 MG/DL
DIFFERENTIAL METHOD: ABNORMAL
EOSINOPHIL # BLD AUTO: 0.3 K/UL
EOSINOPHIL NFR BLD: 7.8 %
ERYTHROCYTE [DISTWIDTH] IN BLOOD BY AUTOMATED COUNT: 15.6 %
EST. GFR  (AFRICAN AMERICAN): >60 ML/MIN/1.73 M^2
EST. GFR  (NON AFRICAN AMERICAN): 52.8 ML/MIN/1.73 M^2
GLUCOSE SERPL-MCNC: 85 MG/DL
HCT VFR BLD AUTO: 37.2 %
HGB BLD-MCNC: 11.4 G/DL
IMM GRANULOCYTES # BLD AUTO: 0 K/UL
IMM GRANULOCYTES NFR BLD AUTO: 0 %
LYMPHOCYTES # BLD AUTO: 1.2 K/UL
LYMPHOCYTES NFR BLD: 32.3 %
MCH RBC QN AUTO: 27.1 PG
MCHC RBC AUTO-ENTMCNC: 30.6 G/DL
MCV RBC AUTO: 89 FL
MONOCYTES # BLD AUTO: 0.4 K/UL
MONOCYTES NFR BLD: 11.6 %
NEUTROPHILS # BLD AUTO: 1.8 K/UL
NEUTROPHILS NFR BLD: 47.2 %
NRBC BLD-RTO: 0 /100 WBC
PLATELET # BLD AUTO: 147 K/UL
PMV BLD AUTO: 12.5 FL
POTASSIUM SERPL-SCNC: 4.3 MMOL/L
PROT SERPL-MCNC: 7 G/DL
RBC # BLD AUTO: 4.2 M/UL
SODIUM SERPL-SCNC: 137 MMOL/L
WBC # BLD AUTO: 3.71 K/UL

## 2018-11-03 PROCEDURE — 36415 COLL VENOUS BLD VENIPUNCTURE: CPT | Mod: PO

## 2018-11-03 PROCEDURE — 80197 ASSAY OF TACROLIMUS: CPT

## 2018-11-03 PROCEDURE — 80053 COMPREHEN METABOLIC PANEL: CPT

## 2018-11-03 PROCEDURE — 85025 COMPLETE CBC W/AUTO DIFF WBC: CPT

## 2018-11-04 LAB — TACROLIMUS BLD-MCNC: 6.8 NG/ML

## 2018-11-05 ENCOUNTER — TELEPHONE (OUTPATIENT)
Dept: TRANSPLANT | Facility: CLINIC | Age: 48
End: 2018-11-05

## 2018-11-05 DIAGNOSIS — Z94.4 LIVER REPLACED BY TRANSPLANT: Primary | ICD-10-CM

## 2018-11-05 DIAGNOSIS — C22.0 HEPATOCELLULAR CARCINOMA: ICD-10-CM

## 2018-11-05 NOTE — TELEPHONE ENCOUNTER
.Labs reviewed by Dr. Nielsen  Continue routine labs no changes needed.  Letter sent for next lab appointment 01/26/19

## 2018-11-05 NOTE — TELEPHONE ENCOUNTER
----- Message from Jaya Nielsen MD sent at 11/5/2018  7:57 AM CST -----  Results reviewed. Please advise labs are stable.

## 2018-11-13 ENCOUNTER — PATIENT MESSAGE (OUTPATIENT)
Dept: NEUROLOGY | Facility: CLINIC | Age: 48
End: 2018-11-13

## 2018-11-16 ENCOUNTER — PES CALL (OUTPATIENT)
Dept: ADMINISTRATIVE | Facility: CLINIC | Age: 48
End: 2018-11-16

## 2018-11-29 ENCOUNTER — PES CALL (OUTPATIENT)
Dept: ADMINISTRATIVE | Facility: CLINIC | Age: 48
End: 2018-11-29

## 2018-12-05 ENCOUNTER — PES CALL (OUTPATIENT)
Dept: ADMINISTRATIVE | Facility: CLINIC | Age: 48
End: 2018-12-05

## 2019-02-02 ENCOUNTER — LAB VISIT (OUTPATIENT)
Dept: LAB | Facility: HOSPITAL | Age: 49
End: 2019-02-02
Attending: INTERNAL MEDICINE
Payer: MEDICARE

## 2019-02-02 DIAGNOSIS — Z94.4 LIVER REPLACED BY TRANSPLANT: ICD-10-CM

## 2019-02-02 LAB
ALBUMIN SERPL BCP-MCNC: 4 G/DL
ALP SERPL-CCNC: 259 U/L
ALT SERPL W/O P-5'-P-CCNC: 43 U/L
ANION GAP SERPL CALC-SCNC: 11 MMOL/L
AST SERPL-CCNC: 43 U/L
BASOPHILS # BLD AUTO: 0.06 K/UL
BASOPHILS NFR BLD: 1.2 %
BILIRUB SERPL-MCNC: 0.9 MG/DL
BUN SERPL-MCNC: 23 MG/DL
CALCIUM SERPL-MCNC: 10.6 MG/DL
CHLORIDE SERPL-SCNC: 100 MMOL/L
CO2 SERPL-SCNC: 26 MMOL/L
CREAT SERPL-MCNC: 1.1 MG/DL
DIFFERENTIAL METHOD: ABNORMAL
EOSINOPHIL # BLD AUTO: 0.2 K/UL
EOSINOPHIL NFR BLD: 4.6 %
ERYTHROCYTE [DISTWIDTH] IN BLOOD BY AUTOMATED COUNT: 14.6 %
EST. GFR  (AFRICAN AMERICAN): >60 ML/MIN/1.73 M^2
EST. GFR  (NON AFRICAN AMERICAN): 58.7 ML/MIN/1.73 M^2
GLUCOSE SERPL-MCNC: 93 MG/DL
HCT VFR BLD AUTO: 39.5 %
HGB BLD-MCNC: 12.5 G/DL
IMM GRANULOCYTES # BLD AUTO: 0.02 K/UL
IMM GRANULOCYTES NFR BLD AUTO: 0.4 %
LYMPHOCYTES # BLD AUTO: 1.2 K/UL
LYMPHOCYTES NFR BLD: 23 %
MCH RBC QN AUTO: 27.3 PG
MCHC RBC AUTO-ENTMCNC: 31.6 G/DL
MCV RBC AUTO: 86 FL
MONOCYTES # BLD AUTO: 0.5 K/UL
MONOCYTES NFR BLD: 10.5 %
NEUTROPHILS # BLD AUTO: 3.1 K/UL
NEUTROPHILS NFR BLD: 60.3 %
NRBC BLD-RTO: 0 /100 WBC
PLATELET # BLD AUTO: 171 K/UL
PMV BLD AUTO: 11.5 FL
POTASSIUM SERPL-SCNC: 3.6 MMOL/L
PROT SERPL-MCNC: 7.7 G/DL
RBC # BLD AUTO: 4.58 M/UL
SODIUM SERPL-SCNC: 137 MMOL/L
WBC # BLD AUTO: 5.05 K/UL

## 2019-02-02 PROCEDURE — 80197 ASSAY OF TACROLIMUS: CPT

## 2019-02-02 PROCEDURE — 85025 COMPLETE CBC W/AUTO DIFF WBC: CPT

## 2019-02-02 PROCEDURE — 80053 COMPREHEN METABOLIC PANEL: CPT

## 2019-02-02 PROCEDURE — 36415 COLL VENOUS BLD VENIPUNCTURE: CPT | Mod: PO

## 2019-02-03 LAB — TACROLIMUS BLD-MCNC: 4.9 NG/ML

## 2019-02-06 ENCOUNTER — TELEPHONE (OUTPATIENT)
Dept: TRANSPLANT | Facility: CLINIC | Age: 49
End: 2019-02-06

## 2019-02-06 DIAGNOSIS — Z94.4 LIVER REPLACED BY TRANSPLANT: Primary | ICD-10-CM

## 2019-02-06 NOTE — TELEPHONE ENCOUNTER
----- Message from Jaya Nielsen MD sent at 2/3/2019 10:47 PM CST -----  Results reviewed. Please advise labs are stable.

## 2019-02-06 NOTE — TELEPHONE ENCOUNTER
Labs reviewed by Dr. Nielsen  Continue routine labs no changes needed.  Letter sent for next lab appointment 05/04/19

## 2019-02-12 ENCOUNTER — PATIENT MESSAGE (OUTPATIENT)
Dept: TRANSPLANT | Facility: CLINIC | Age: 49
End: 2019-02-12

## 2019-02-18 ENCOUNTER — TELEPHONE (OUTPATIENT)
Dept: DERMATOLOGY | Facility: CLINIC | Age: 49
End: 2019-02-18

## 2019-02-18 NOTE — TELEPHONE ENCOUNTER
Spoke to PT,she already scheduled an appt for her and her daughter.  ----- Message from Isabelle EVERETT Darrell sent at 2/18/2019  3:36 PM CST -----  Contact: PT Portal Request  Appointment Request From: Elda Hernandez    With Provider: Darrell    Preferred Date Range: 2/27/2019 - 2/27/2019    Preferred Times: Monday Afternoon, Tuesday Afternoon, Wednesday Afternoon, Thursday Afternoon, Friday Afternoon    Reason for visit: Dermatology    Comments:  I wanted to see if I could schedule an appt. for 3:15 and 3:30 for my daughter on 2/27 she is scheduled for 2/19 Ana Rosa  [phone]

## 2019-02-28 ENCOUNTER — INITIAL CONSULT (OUTPATIENT)
Dept: DERMATOLOGY | Facility: CLINIC | Age: 49
End: 2019-02-28
Payer: MEDICARE

## 2019-02-28 DIAGNOSIS — D22.9 MULTIPLE BENIGN NEVI: Primary | ICD-10-CM

## 2019-02-28 DIAGNOSIS — Z79.60 LONG-TERM USE OF IMMUNOSUPPRESSANT MEDICATION: ICD-10-CM

## 2019-02-28 DIAGNOSIS — D18.00 ANGIOMA: ICD-10-CM

## 2019-02-28 DIAGNOSIS — L81.4 LENTIGINES: ICD-10-CM

## 2019-02-28 DIAGNOSIS — L91.8 CUTANEOUS SKIN TAGS: ICD-10-CM

## 2019-02-28 PROCEDURE — 99203 OFFICE O/P NEW LOW 30 MIN: CPT | Mod: S$GLB,,, | Performed by: DERMATOLOGY

## 2019-02-28 PROCEDURE — 99203 PR OFFICE/OUTPT VISIT, NEW, LEVL III, 30-44 MIN: ICD-10-PCS | Mod: S$GLB,,, | Performed by: DERMATOLOGY

## 2019-02-28 PROCEDURE — 99999 PR PBB SHADOW E&M-EST. PATIENT-LVL II: ICD-10-PCS | Mod: PBBFAC,,, | Performed by: DERMATOLOGY

## 2019-02-28 PROCEDURE — 99999 PR PBB SHADOW E&M-EST. PATIENT-LVL II: CPT | Mod: PBBFAC,,, | Performed by: DERMATOLOGY

## 2019-02-28 NOTE — LETTER
March 3, 2019      Nell Gordon, NP  [address]  [facility] [address]           [facility]  [address] [facility] [address] [place] [address]  Phone: [phone]  Fax: [phone]          Patient: Elda Hernandez   MR Number: [id number]   YOB: 1970   Date of Visit: 2/28/2019       Dear Nell Gordon:    Thank you for referring Elda Hernandez to me for evaluation. Attached you will find relevant portions of my assessment and plan of care.    If you have questions, please do not hesitate to call me. I look forward to following Elda Hernandez along with you.    Sincerely,    Caro Ramírez MD    Enclosure  CC:  No Recipients    If you would like to receive this communication electronically, please contact [email or web address] or [phone] to request more information on [employer] Link access.    For providers and/or their staff who would like to refer a patient to [facility], please contact us through our one-stop-shop provider referral line, [facility], at [phone].    If you feel you have received this communication in error or would no longer like to receive these types of communications, please e-mail [email or web address]

## 2019-02-28 NOTE — PROGRESS NOTES
Subjective:       Patient ID:  Elda Hernandez is a 48 y.o. female who presents for   Chief Complaint   Patient presents with    Skin Check     49 y/o F initial visit with daughter for evaluation of a few moles that are bothersome.  They are irritated when they rub on her clothing and she would like to discuss having them removed. She denies bleeding.  No prior treatments. She had a liver transplant in 2002 and her transplant team recommended that she see a dermatologist.    Derm Hx: no h/o skin CA although high risk for development of skin cancer due to immunosuppression s/p liver transplant 9/23/2002 (Glycogen Storage Disease Type I (GSD-I)); on CellCept and Prograf  Family Hx: Denies family h/o melanoma       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    Arnold-Chiari malformation     Depression     Esophageal stricture     Essential tremor     Hypertension     Left bundle branch block     Liver fibrosis, transplanted liver 10/2/2018    Suggested on fibroscan 10/2/18    Migraine without aura     MVP (mitral valve prolapse)     Non-rheumatic mitral regurgitation 10/1/2018    Non-rheumatic tricuspid valve insufficiency 10/1/2018    Osteoporosis     Recurrent urinary tract infection     Seizures     Shingles 2007    SIADH (syndrome of inappropriate ADH production)     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    Tricuspid valve prolapse     Urolithiasis     Von Gierke disease     s/p liver transplant     Review of Systems   Constitutional: Negative for fever and chills.   Skin: Positive for daily sunscreen use, activity-related sunscreen use and wears hat. Negative for itching, rash and dry skin.        Objective:    Physical Exam   Constitutional: She appears well-developed and well-nourished.   HENT:   Mouth/Throat: Lips normal.    Eyes: Lids are normal.    Neurological: She is alert and oriented to person, place, and time.   Psychiatric: She has a normal  mood and affect.   Skin:   Areas Examined (abnormalities noted in diagram):   Head / Face Inspection Performed  Neck Inspection Performed  Chest / Axilla Inspection Performed  Abdomen Inspection Performed  Back Inspection Performed  RUE Inspected  LUE Inspection Performed              Diagram Legend     Erythematous scaling macule/papule c/w actinic keratosis       Vascular papule c/w angioma      Pigmented verrucoid papule/plaque c/w seborrheic keratosis      Yellow umbilicated papule c/w sebaceous hyperplasia      Irregularly shaped tan macule c/w lentigo     1-2 mm smooth white papules consistent with Milia      Movable subcutaneous cyst with punctum c/w epidermal inclusion cyst      Subcutaneous movable cyst c/w pilar cyst      Firm pink to brown papule c/w dermatofibroma      Pedunculated fleshy papule(s) c/w skin tag(s)      Evenly pigmented macule c/w junctional nevus     Mildly variegated pigmented, slightly irregular-bordered macule c/w mildly atypical nevus      Flesh colored to evenly pigmented papule c/w intradermal nevus       Pink pearly papule/plaque c/w basal cell carcinoma      Erythematous hyperkeratotic cursted plaque c/w SCC      Surgical scar with no sign of skin cancer recurrence      Open and closed comedones      Inflammatory papules and pustules      Verrucoid papule consistent consistent with wart     Erythematous eczematous patches and plaques     Dystrophic onycholytic nail with subungual debris c/w onychomycosis     Umbilicated papule    Erythematous-base heme-crusted tan verrucoid plaque consistent with inflamed seborrheic keratosis     Erythematous Silvery Scaling Plaque c/w Psoriasis     See annotation      Assessment / Plan:        Multiple benign nevi  Reassurance that her nevi appear benign with regular and consistent pigment pattern on dermoscopy    Lentigines  These are benign hyperpigmented sun induced lesions. Daily sun protection will reduce the number of new  lesions    Cutaneous skin tags  Reassurance    Angioma  This is a benign vascular lesion. Reassurance given. No treatment required.     Long-term use of immunosuppressant medication  -Only performed UBSE today.  -When pt returns for follow up and shave removal of the 3 moles that are bothersome to her, will encourage her to let me examine her legs since she has not had TBSE since her transplant in 2002.           Follow-up for as scheduled for mole removal. shave removal x 3 (2 nevi on R chest and 1 nevus on R upper arm)

## 2019-03-13 ENCOUNTER — HOSPITAL ENCOUNTER (OUTPATIENT)
Dept: RADIOLOGY | Facility: HOSPITAL | Age: 49
Discharge: HOME | End: 2019-03-13
Attending: INTERNAL MEDICINE
Payer: MEDICARE

## 2019-03-13 ENCOUNTER — PROCEDURE VISIT (OUTPATIENT)
Dept: DERMATOLOGY | Facility: CLINIC | Age: 49
End: 2019-03-13
Payer: MEDICARE

## 2019-03-13 DIAGNOSIS — D48.5 NEOPLASM OF UNCERTAIN BEHAVIOR OF SKIN: ICD-10-CM

## 2019-03-13 DIAGNOSIS — L29.9 PRURITIC CONDITION: Primary | ICD-10-CM

## 2019-03-13 DIAGNOSIS — Z94.4 LIVER REPLACED BY TRANSPLANT: ICD-10-CM

## 2019-03-13 PROCEDURE — 88305 TISSUE EXAM BY PATHOLOGIST: CPT | Mod: 59 | Performed by: PATHOLOGY

## 2019-03-13 PROCEDURE — 76705 US LIVER TRANSPLANT POST: ICD-10-PCS | Mod: 26,59,, | Performed by: RADIOLOGY

## 2019-03-13 PROCEDURE — 88305 TISSUE SPECIMEN TO PATHOLOGY, DERMATOLOGY: ICD-10-PCS | Mod: 26,,, | Performed by: PATHOLOGY

## 2019-03-13 PROCEDURE — 76705 ECHO EXAM OF ABDOMEN: CPT | Mod: 26,59,, | Performed by: RADIOLOGY

## 2019-03-13 PROCEDURE — 99213 OFFICE O/P EST LOW 20 MIN: CPT | Mod: 25,S$GLB,, | Performed by: DERMATOLOGY

## 2019-03-13 PROCEDURE — 11102 TANGNTL BX SKIN SINGLE LES: CPT | Mod: S$GLB,,, | Performed by: DERMATOLOGY

## 2019-03-13 PROCEDURE — 11103 PR TANGENTIAL BIOPSY, SKIN, EA ADDTL LESION: ICD-10-PCS | Mod: 59,S$GLB,, | Performed by: DERMATOLOGY

## 2019-03-13 PROCEDURE — 88305 TISSUE EXAM BY PATHOLOGIST: CPT | Mod: 26,,, | Performed by: PATHOLOGY

## 2019-03-13 PROCEDURE — 11103 TANGNTL BX SKIN EA SEP/ADDL: CPT | Mod: 59,S$GLB,, | Performed by: DERMATOLOGY

## 2019-03-13 PROCEDURE — 99213 PR OFFICE/OUTPT VISIT, EST, LEVL III, 20-29 MIN: ICD-10-PCS | Mod: 25,S$GLB,, | Performed by: DERMATOLOGY

## 2019-03-13 PROCEDURE — 11102 PR TANGENTIAL BIOPSY, SKIN, SINGLE LESION: ICD-10-PCS | Mod: S$GLB,,, | Performed by: DERMATOLOGY

## 2019-03-13 PROCEDURE — 93975 US LIVER TRANSPLANT POST: ICD-10-PCS | Mod: 26,,, | Performed by: RADIOLOGY

## 2019-03-13 PROCEDURE — 93975 VASCULAR STUDY: CPT | Mod: 26,,, | Performed by: RADIOLOGY

## 2019-03-13 PROCEDURE — 93975 VASCULAR STUDY: CPT | Mod: TC,PO

## 2019-03-13 RX ORDER — TRIAMCINOLONE ACETONIDE 1 MG/G
CREAM TOPICAL 2 TIMES DAILY
Qty: 454 G | Refills: 1 | Status: ON HOLD | OUTPATIENT
Start: 2019-03-13 | End: 2020-11-19 | Stop reason: HOSPADM

## 2019-03-13 NOTE — PROGRESS NOTES
Subjective:       Patient ID:  Elda Hernandez is a 48 y.o. female who presents for   Chief Complaint   Patient presents with    Biopsy     HPI  Pt presents for biopsy.  There are several spots that are irritated and she would like to have them removed. She is also concerned about her itchy skin.  It is itchy constantly    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    Arnold-Chiari malformation     Depression     Esophageal stricture     Essential tremor     Hypertension     Left bundle branch block     Liver fibrosis, transplanted liver 10/2/2018    Suggested on fibroscan 10/2/18    Migraine without aura     MVP (mitral valve prolapse)     Non-rheumatic mitral regurgitation 10/1/2018    Non-rheumatic tricuspid valve insufficiency 10/1/2018    Osteoporosis     Recurrent urinary tract infection     Seizures     Shingles 2007    SIADH (syndrome of inappropriate ADH production)     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    Tricuspid valve prolapse     Urolithiasis     Von Gierke disease     s/p liver transplant     Review of Systems   Constitutional: Negative for fever and chills.   Skin: Positive for daily sunscreen use, activity-related sunscreen use and wears hat. Negative for itching, rash and dry skin.        Objective:    Physical Exam   Constitutional: She appears well-developed and well-nourished.   Neurological: She is alert and oriented to person, place, and time.   Psychiatric: She has a normal mood and affect.   Skin:   Areas Examined (abnormalities noted in diagram):   Head / Face Inspection Performed  Neck Inspection Performed  Chest / Axilla Inspection Performed  RUE Inspected              Diagram Legend     Erythematous scaling macule/papule c/w actinic keratosis       Vascular papule c/w angioma      Pigmented verrucoid papule/plaque c/w seborrheic keratosis      Yellow umbilicated papule c/w sebaceous hyperplasia      Irregularly shaped tan  macule c/w lentigo     1-2 mm smooth white papules consistent with Milia      Movable subcutaneous cyst with punctum c/w epidermal inclusion cyst      Subcutaneous movable cyst c/w pilar cyst      Firm pink to brown papule c/w dermatofibroma      Pedunculated fleshy papule(s) c/w skin tag(s)      Evenly pigmented macule c/w junctional nevus     Mildly variegated pigmented, slightly irregular-bordered macule c/w mildly atypical nevus      Flesh colored to evenly pigmented papule c/w intradermal nevus       Pink pearly papule/plaque c/w basal cell carcinoma      Erythematous hyperkeratotic cursted plaque c/w SCC      Surgical scar with no sign of skin cancer recurrence      Open and closed comedones      Inflammatory papules and pustules      Verrucoid papule consistent consistent with wart     Erythematous eczematous patches and plaques     Dystrophic onycholytic nail with subungual debris c/w onychomycosis     Umbilicated papule    Erythematous-base heme-crusted tan verrucoid plaque consistent with inflamed seborrheic keratosis     Erythematous Silvery Scaling Plaque c/w Psoriasis     See annotation      Assessment / Plan:      Pathology Orders:     Normal Orders This Visit    Tissue Specimen To Pathology, Dermatology     Questions:    Directional Terms:  Other(comment)    Clinical Information:  r/o inflamed tag vs irritated nevus vs other    Specific Site:  R chest    Tissue Specimen To Pathology, Dermatology     Questions:    Directional Terms:  Other(comment)    Clinical Information:  r/o inflamed tag vs irritated nevus vs other    Specific Site:  R lower breast    Tissue Specimen To Pathology, Dermatology     Questions:    Directional Terms:  Other(comment)    Clinical Information:  r/o inflamed nevus    Specific Site:  R arm          Neoplasm of uncertain behavior of skin  -     Tissue Specimen To Pathology, Dermatology  -     Tissue Specimen To Pathology, Dermatology  -     Tissue Specimen To Pathology,  Dermatology    Shave biopsy procedure note:    Shave biopsy performed after verbal consent including risk of infection, scar, recurrence, need for additional treatment of site. Area prepped with alcohol, anesthetized with approximately 1.0cc of 1% lidocaine with epinephrine. Lesional tissue shaved with razor blade. Hemostasis achieved with application of aluminum chloride followed by hyfrecation. No complications. Dressing applied. Wound care explained.    Pruritic condition-  She is a liver transplant patient and has had problems with chronic rejection  LFTs checked recently (Alk P elevated, AST slightly elevated, T Bili normal)  CBC WNL  Consider CXR for pruritis work up as well.  Will msg her transplant team re: this as well  -     TSH; Future; Expected date: 03/13/2019  -     T4, FREE; Future; Expected date: 03/13/2019  -     triamcinolone acetonide 0.1% (KENALOG) 0.1 % cream; Apply topically 2 (two) times daily.  Dispense: 454 g; Refill: 1  Gentle cleanser, encouraged liberal use of moisturizer as this can contribute to pruritis  msg to transplant team re: Zyrtec and doxepin         Follow-up for pending Pathology.

## 2019-03-15 ENCOUNTER — TELEPHONE (OUTPATIENT)
Dept: TRANSPLANT | Facility: CLINIC | Age: 49
End: 2019-03-15

## 2019-03-15 NOTE — TELEPHONE ENCOUNTER
Dr Nielsen reviewed your Ultrasound, there was something seen on your kidneys.  He would like you to see a Urologist.  I can Lyla my  do that for you .

## 2019-03-18 ENCOUNTER — PATIENT MESSAGE (OUTPATIENT)
Dept: TRANSPLANT | Facility: CLINIC | Age: 49
End: 2019-03-18

## 2019-03-19 ENCOUNTER — PATIENT MESSAGE (OUTPATIENT)
Dept: TRANSPLANT | Facility: CLINIC | Age: 49
End: 2019-03-19

## 2019-03-20 ENCOUNTER — PATIENT MESSAGE (OUTPATIENT)
Dept: TRANSPLANT | Facility: CLINIC | Age: 49
End: 2019-03-20

## 2019-03-20 DIAGNOSIS — L29.9 PRURITUS: Primary | ICD-10-CM

## 2019-03-21 DIAGNOSIS — R93.429 ABNORMAL RADIOLOGIC FINDINGS ON DIAGNOSTIC IMAGING OF UNSPECIFIED KIDNEY: Primary | ICD-10-CM

## 2019-03-22 ENCOUNTER — PATIENT MESSAGE (OUTPATIENT)
Dept: DERMATOLOGY | Facility: CLINIC | Age: 49
End: 2019-03-22

## 2019-03-25 ENCOUNTER — PATIENT MESSAGE (OUTPATIENT)
Dept: DERMATOLOGY | Facility: CLINIC | Age: 49
End: 2019-03-25

## 2019-03-25 ENCOUNTER — HOSPITAL ENCOUNTER (OUTPATIENT)
Dept: RADIOLOGY | Facility: HOSPITAL | Age: 49
Discharge: HOME | End: 2019-03-25
Attending: INTERNAL MEDICINE
Payer: MEDICARE

## 2019-03-25 DIAGNOSIS — L29.9 PRURITUS: ICD-10-CM

## 2019-03-25 PROCEDURE — 71046 XR CHEST PA AND LATERAL: ICD-10-PCS | Mod: 26,,, | Performed by: RADIOLOGY

## 2019-03-25 PROCEDURE — 71046 X-RAY EXAM CHEST 2 VIEWS: CPT | Mod: 26,,, | Performed by: RADIOLOGY

## 2019-03-25 PROCEDURE — 71046 X-RAY EXAM CHEST 2 VIEWS: CPT | Mod: TC,FY,PO

## 2019-03-26 ENCOUNTER — PATIENT MESSAGE (OUTPATIENT)
Dept: DERMATOLOGY | Facility: CLINIC | Age: 49
End: 2019-03-26

## 2019-03-26 ENCOUNTER — TELEPHONE (OUTPATIENT)
Dept: DERMATOLOGY | Facility: CLINIC | Age: 49
End: 2019-03-26

## 2019-03-26 ENCOUNTER — TELEPHONE (OUTPATIENT)
Dept: TRANSPLANT | Facility: CLINIC | Age: 49
End: 2019-03-26

## 2019-03-26 DIAGNOSIS — L29.9 PRURITIC CONDITION: Primary | ICD-10-CM

## 2019-03-26 RX ORDER — HYDROXYZINE HYDROCHLORIDE 25 MG/1
25 TABLET, FILM COATED ORAL EVERY 8 HOURS PRN
Qty: 90 TABLET | Refills: 1 | Status: ON HOLD | OUTPATIENT
Start: 2019-03-26 | End: 2020-09-04 | Stop reason: ALTCHOICE

## 2019-03-26 NOTE — TELEPHONE ENCOUNTER
----- Message from Jakob Farris PharmBÁRBARA sent at 3/26/2019 10:13 AM CDT -----  Regarding: RE: any med interactions?  Nell,    There are no issues with the zyrtec, but I would be hesitant to add doxepinto this patient. The issues:    1) Patient is on amitriptyline and doxepin is very similar (Both tricyclic antidepressants), so this would be duplication of therapy.  2) Doxepin is a QT prolonging agent, and patient is already on venlafaxine and tacrolimus. I do not see a current EKG, so I would want a baseline QTc prior to placing patient on additional QT prolonging agent.  3) Doxepin added to clonazepam and gabapentin has the potential to cause oversedation.  4) Doxepin added to venlafaxine has also resulted in cases of serotonin syndrome.     As far as a prn agent, I would prefer something like hydroxyzine 25mg Q8H prn. I am also fine with any topical treatment as well.     Please let me know if there is anything else I can help with.    Thanks,  Jakob Farris, PharmD. BCPS    ----- Message -----  From: Nell Gordon NP  Sent: 3/26/2019   8:40 AM  To: [facility]  Subject: any med interactions?                            Would you mind checking to see if either medication interacts with her current medications?    Thanks  Nell  ----- Message -----  From: Caro Ramírez MD  Sent: 3/25/2019   4:52 PM  To: Nell Gordon NP    Would it be ok for her to take Zyrtec in the morning and Doxepin in the evening?  Just wanted to make sure ok from liver standpoint?  Thanks again!    Caro  ----- Message -----  From: Nell Gordon NP  Sent: 3/19/2019   4:08 PM  To: Caro Ramírez MD, #    Dr. Ramírez    Thanks so much for the update/message. I had messaged Dr. Nielsen since she chronically follows with him and will arrange further testing once I hear back    Thanks again for the info  Nell  ----- Message -----  From: Caro Ramírez MD  Sent: 3/16/2019  10:43 AM  To: Nell Gordon,  NP    Hi!  I am seeing a patient of yours in Dermatology.  She is complaining of itching (constant), no obvious rash.  I see that some of her LFTs are elevated, although T Bili normal so unsure of her etiology.  I talked to her about gentle skin care routine, prescribed topical steroid for the very itchy areas, and said I'd talk to you about anti-histamine use as needed (Zyrtec, doxepin).  Any contraindication from your standpoint?  I can prescribe doxepin prn and she can get the Zyrtec OTC and use during the day prn if your team says it is ok.  This may give her some relief.  She seems pretty miserable. 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  Only symptom was pruritis.It doesn't look like she has had a CXR recently. Thanks! Look forward to hearing from you    Caro

## 2019-03-27 ENCOUNTER — TELEPHONE (OUTPATIENT)
Dept: TRANSPLANT | Facility: CLINIC | Age: 49
End: 2019-03-27

## 2019-03-27 NOTE — TELEPHONE ENCOUNTER
----- Message from Jaya Nielsen MD sent at 3/26/2019  8:42 AM CDT -----  Results reviewed. Please advise labs are stable.

## 2019-03-27 NOTE — TELEPHONE ENCOUNTER
Dr Nielsen reviewed CMP and Chest X-ray all were normal.  Done per request from Dermatologist.  Sent a message to notify patient of results.

## 2019-04-01 ENCOUNTER — OFFICE VISIT (OUTPATIENT)
Dept: UROLOGY | Facility: CLINIC | Age: 49
End: 2019-04-01
Payer: MEDICARE

## 2019-04-01 VITALS
WEIGHT: 148.56 LBS | HEIGHT: 59 IN | BODY MASS INDEX: 29.95 KG/M2 | HEART RATE: 84 BPM | DIASTOLIC BLOOD PRESSURE: 73 MMHG | SYSTOLIC BLOOD PRESSURE: 124 MMHG

## 2019-04-01 DIAGNOSIS — N28.89 RENAL MASS: Primary | ICD-10-CM

## 2019-04-01 LAB
BILIRUB SERPL-MCNC: NORMAL MG/DL
BLOOD URINE, POC: NORMAL
COLOR, POC UA: YELLOW
GLUCOSE UR QL STRIP: NORMAL
KETONES UR QL STRIP: NORMAL
LEUKOCYTE ESTERASE URINE, POC: NORMAL
NITRITE, POC UA: NORMAL
PH, POC UA: 6
POC RESIDUAL URINE VOLUME: 33 ML (ref 0–100)
PROTEIN, POC: NORMAL
SPECIFIC GRAVITY, POC UA: 1.01
UROBILINOGEN, POC UA: 1

## 2019-04-01 PROCEDURE — 99999 PR PBB SHADOW E&M-EST. PATIENT-LVL III: ICD-10-PCS | Mod: PBBFAC,,, | Performed by: UROLOGY

## 2019-04-01 PROCEDURE — 3008F PR BODY MASS INDEX (BMI) DOCUMENTED: ICD-10-PCS | Mod: S$GLB,,, | Performed by: UROLOGY

## 2019-04-01 PROCEDURE — 99999 PR PBB SHADOW E&M-EST. PATIENT-LVL III: CPT | Mod: PBBFAC,,, | Performed by: UROLOGY

## 2019-04-01 PROCEDURE — 51798 POCT BLADDER SCAN: ICD-10-PCS | Mod: S$GLB,,, | Performed by: UROLOGY

## 2019-04-01 PROCEDURE — 81002 URINALYSIS NONAUTO W/O SCOPE: CPT | Mod: S$GLB,,, | Performed by: UROLOGY

## 2019-04-01 PROCEDURE — 3074F SYST BP LT 130 MM HG: CPT | Mod: S$GLB,,, | Performed by: UROLOGY

## 2019-04-01 PROCEDURE — 3008F BODY MASS INDEX DOCD: CPT | Mod: S$GLB,,, | Performed by: UROLOGY

## 2019-04-01 PROCEDURE — 81002 POCT URINE DIPSTICK WITHOUT MICROSCOPE: ICD-10-PCS | Mod: S$GLB,,, | Performed by: UROLOGY

## 2019-04-01 PROCEDURE — 3078F PR MOST RECENT DIASTOLIC BLOOD PRESSURE < 80 MM HG: ICD-10-PCS | Mod: S$GLB,,, | Performed by: UROLOGY

## 2019-04-01 PROCEDURE — 51798 US URINE CAPACITY MEASURE: CPT | Mod: S$GLB,,, | Performed by: UROLOGY

## 2019-04-01 PROCEDURE — 99203 OFFICE O/P NEW LOW 30 MIN: CPT | Mod: 25,S$GLB,, | Performed by: UROLOGY

## 2019-04-01 PROCEDURE — 99203 PR OFFICE/OUTPT VISIT, NEW, LEVL III, 30-44 MIN: ICD-10-PCS | Mod: 25,S$GLB,, | Performed by: UROLOGY

## 2019-04-01 PROCEDURE — 3078F DIAST BP <80 MM HG: CPT | Mod: S$GLB,,, | Performed by: UROLOGY

## 2019-04-01 PROCEDURE — 3074F PR MOST RECENT SYSTOLIC BLOOD PRESSURE < 130 MM HG: ICD-10-PCS | Mod: S$GLB,,, | Performed by: UROLOGY

## 2019-04-01 RX ORDER — VENLAFAXINE HYDROCHLORIDE 150 MG/1
CAPSULE, EXTENDED RELEASE ORAL
Refills: 1 | Status: ON HOLD | COMMUNITY
Start: 2019-01-18 | End: 2020-09-04 | Stop reason: ALTCHOICE

## 2019-04-01 RX ORDER — LISINOPRIL 40 MG/1
TABLET ORAL
Refills: 5 | Status: ON HOLD | COMMUNITY
Start: 2019-01-14 | End: 2020-09-04 | Stop reason: ALTCHOICE

## 2019-04-01 RX ORDER — GABAPENTIN 300 MG/1
300 CAPSULE ORAL 3 TIMES DAILY
Status: ON HOLD | COMMUNITY
End: 2020-09-04 | Stop reason: ALTCHOICE

## 2019-04-01 NOTE — PROGRESS NOTES
Subjective:       Patient ID: Elda Hernandez is a 48 y.o. female.    Chief Complaint: No chief complaint on file.    HPI     48 year old with previous liver transplant.  She gets regular liver transplant ultrasound.  Recent US noted a lesion with a mural calcification.  She had previously seen by Dr. Zheng years ago and she   Had annual CT scans to monitor a complex cyst on the right kidney.  CT scan report from 204 noted a 10 mm lesion.  The most recent US measured the lesion at 12mm.   I reviewed the images with her as well as a CT scan in 2015.  The lesion appears to be a focal scar with parenchymal calcification.   Her bladder was also notably distended.  She says she only voids 2 times per day but feels she empties completely.  No incontinence.    Urine dipstick shows negative for nitrites, glucose, protein, positive for 1+leukocytes, trace blood  PVR 33 ml    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    Arnold-Chiari malformation     Depression     Esophageal stricture     Essential tremor     Hypertension     Left bundle branch block     Liver fibrosis, transplanted liver 10/2/2018    Suggested on fibroscan 10/2/18    Migraine without aura     MVP (mitral valve prolapse)     Non-rheumatic mitral regurgitation 10/1/2018    Non-rheumatic tricuspid valve insufficiency 10/1/2018    Osteoporosis     Recurrent urinary tract infection     Seizures     Shingles 2007    SIADH (syndrome of inappropriate ADH production)     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    Tricuspid valve prolapse     Urolithiasis     Von Gierke disease     s/p liver transplant     Past Surgical History:   Procedure Laterality Date    APPENDECTOMY  6/22/2007    BIOPSY-LIVER N/A 9/14/2015    [facility]    BIOPSY-LIVER- ULTRASOUND GUIDED N/A 4/23/2015    Performed by [facility] Surgeon at [facility]    COLONOSCOPY  5/13/2008    internal  hemorrhoids    COLONOSCOPY N/A 10/18/2013    Performed by [facility] at [facility] END[facility] (2ND F[facility])    CRANIOTOMY      ERCP N/A 4/7/2015    Performed by Maximino Sharma MD at [facility] ENDO (2ND FLR)    ERCP N/A 3/20/2015    Performed by James West MD at [facility] ENDO (2ND FLR)    ERCP N/A 1/14/2015    Performed by James West MD at [facility] ENDO (2ND FLR)    LAMINECTOMY  3/2001    LIVER TRANSPLANT  9/23/2002    OSSICULAR RECONSTRUCTION  10/4/1995    RIGHT REPLACEMENT PROSTHESIS for cholesteatoma    SIGMOIDOSCOPY-FLEXIBLE N/A 4/21/2015    Performed by Carroll Cheema MD at [facility] ENDO (2ND FLR)    THYMECTOMY  5/2/2007    TONSILLECTOMY, ADENOIDECTOMY  1/21/2004    TOTAL ABDOMINAL HYSTERECTOMY  3/31/1994       Current Outpatient Medications:     amitriptyline (ELAVIL) 75 MG tablet, TK 1 T PO  QD, Disp: , Rfl: 0    calcium carbonate (OS-JASON) 600 mg (1,500 mg) Tab, Take 600 mg by mouth 2 (two) times daily with meals., Disp: , Rfl:     clonazePAM (KLONOPIN) 0.5 MG tablet, Take 0.5 mg by mouth 2 (two) times daily as needed for Anxiety., Disp: , Rfl:     demeclocycline (DECLOMYCIN) 150 MG Tab, Take 150 mg by mouth every 12 (twelve) hours., Disp: , Rfl:     ergocalciferol (ERGOCALCIFEROL) 50,000 unit Cap, Take 50,000 Units by mouth every 7 days., Disp: , Rfl:     gabapentin (NEURONTIN) 300 MG capsule, Take 300 mg by mouth 3 (three) times daily., Disp: , Rfl:     hydrOXYzine HCl (ATARAX) 25 MG tablet, Take 1 tablet (25 mg total) by mouth every 8 (eight) hours as needed for Itching., Disp: 90 tablet, Rfl: 1    lactulose (CHRONULAC) 10 gram/15 mL solution, TAKE 20 ML BY MOUTH TWICE DAILY, Disp: 3645 mL, Rfl: 0    levothyroxine (SYNTHROID) 75 MCG tablet, Take 75 mcg by mouth once daily., Disp: , Rfl:     lisinopril (PRINIVIL,ZESTRIL) 40 MG tablet, TK 1 T PO QD, Disp: , Rfl: 5    multivitamin capsule, Take 1 capsule by mouth once daily., Disp: , Rfl:     mycophenolate (CELLCEPT) 250 mg Cap, Take 2  capsules (500 mg total) by mouth 2 (two) times daily., Disp: 120 capsule, Rfl: 6    ondansetron (ZOFRAN) 8 MG tablet, TAKE 1 TABLET(8 MG) BY MOUTH EVERY 8 HOURS AS NEEDED FOR NAUSEA, Disp: 30 tablet, Rfl: 0    pantoprazole (PROTONIX) 40 MG tablet, Take 1 tablet (40 mg total) by mouth 2 (two) times daily., Disp: 60 tablet, Rfl: 2    potassium chloride (KLOR-CON) 10 MEQ TbSR, TAKE 2 TABLETS(20 MEQ) BY MOUTH EVERY DAY, Disp: 60 tablet, Rfl: 0    promethazine (PHENERGAN) 25 MG tablet, Take 25 mg by mouth every 4 (four) hours as needed (if unrelieved by zofran). , Disp: , Rfl: 5    saliva stimulant agents comb.3 (BIOTENE MOISTURIZING MOUTH) Spry, by Mucous Membrane route daily as needed., Disp: , Rfl:     tacrolimus (PROGRAF) 1 MG Cap, Take 1 capsule (1 mg total) by mouth every 12 (twelve) hours., Disp: 180 capsule, Rfl: 3    triamcinolone acetonide 0.1% (KENALOG) 0.1 % cream, Apply topically 2 (two) times daily., Disp: 454 g, Rfl: 1    venlafaxine (EFFEXOR-XR) 150 MG Cp24, TK 1 C PO QD WF, Disp: , Rfl: 1      Review of Systems   Constitutional: Negative for fever.   Eyes: Negative for visual disturbance.   Respiratory: Negative for shortness of breath.    Cardiovascular: Negative for chest pain.   Gastrointestinal: Negative for nausea.   Genitourinary: Negative for dysuria, flank pain and hematuria.   Musculoskeletal: Negative for gait problem.   Skin: Negative for rash.   Neurological: Negative for seizures.   Psychiatric/Behavioral: Negative for confusion.       Objective:      Physical Exam   Constitutional: She is oriented to person, place, and time. She appears well-developed and well-nourished.   HENT:   Head: Normocephalic.   Eyes: Conjunctivae and EOM are normal.   Neck: Normal range of motion.   Cardiovascular: Normal rate.   Pulmonary/Chest: Effort normal.   Abdominal: Soft. She exhibits no distension and no mass. There is no tenderness.   Genitourinary:   Genitourinary Comments: Bladder non-tender and  nondistended  No CVA tenderness   Musculoskeletal: She exhibits no edema.   Neurological: She is alert and oriented to person, place, and time.   Skin: Skin is warm and dry. No rash noted. No erythema.   Psychiatric: She has a normal mood and affect. Her behavior is normal.   Vitals reviewed.      Assessment:       1. Renal mass        Plan:       Renal mass  -     POCT URINE DIPSTICK WITHOUT MICROSCOPE  -     CT Abdomen Pelvis W Wo Contrast; Future; Expected date: 04/01/2019      Will update CT scan.

## 2019-04-01 NOTE — LETTER
April 1, 2019      Jaya Nielsen MD  [address]  [facility] [address]           [facility]  [address] [facility] [address] [place] [address]  Phone: [phone]          Patient: lEda Hernandez   MR Number: [id number]   YOB: 1970   Date of Visit: 4/1/2019       Dear Dr. Jaya Nielsen:    Thank you for referring Elda Hernandez to me for evaluation. Attached you will find relevant portions of my assessment and plan of care.    If you have questions, please do not hesitate to call me. I look forward to following Elda Hernandez along with you.    Sincerely,    EARL Hood MD    Enclosure  CC:  No Recipients    If you would like to receive this communication electronically, please contact [email or web address] or [phone] to request more information on [employer] access.    For providers and/or their staff who would like to refer a patient to [facility], please contact us through our one-stop-shop provider referral line, [facility], at [phone].    If you feel you have received this communication in error or would no longer like to receive these types of communications, please e-mail [email or web address]

## 2019-04-04 ENCOUNTER — HOSPITAL ENCOUNTER (OUTPATIENT)
Dept: RADIOLOGY | Facility: HOSPITAL | Age: 49
Discharge: HOME | End: 2019-04-04
Attending: UROLOGY
Payer: MEDICARE

## 2019-04-04 DIAGNOSIS — N28.89 RENAL MASS: ICD-10-CM

## 2019-04-04 PROCEDURE — 25500020 PHARM REV CODE 255: Mod: PO | Performed by: UROLOGY

## 2019-04-04 PROCEDURE — 74178 CT ABD&PLV WO CNTR FLWD CNTR: CPT | Mod: 26,,, | Performed by: RADIOLOGY

## 2019-04-04 PROCEDURE — 74178 CT ABDOMEN PELVIS W WO CONTRAST: ICD-10-PCS | Mod: 26,,, | Performed by: RADIOLOGY

## 2019-04-04 PROCEDURE — 74178 CT ABD&PLV WO CNTR FLWD CNTR: CPT | Mod: TC,PO

## 2019-04-04 RX ADMIN — IOHEXOL 120 ML: 350 INJECTION, SOLUTION INTRAVENOUS at 03:04

## 2019-05-09 ENCOUNTER — OFFICE VISIT (OUTPATIENT)
Dept: UROLOGY | Facility: CLINIC | Age: 49
End: 2019-05-09
Payer: MEDICARE

## 2019-05-09 VITALS — WEIGHT: 152.31 LBS | HEIGHT: 59 IN | BODY MASS INDEX: 30.71 KG/M2

## 2019-05-09 DIAGNOSIS — N28.89 RENAL MASS: Primary | ICD-10-CM

## 2019-05-09 LAB
BILIRUB SERPL-MCNC: NORMAL MG/DL
BLOOD URINE, POC: NORMAL
COLOR, POC UA: NORMAL
GLUCOSE UR QL STRIP: NORMAL
KETONES UR QL STRIP: NORMAL
LEUKOCYTE ESTERASE URINE, POC: NORMAL
NITRITE, POC UA: NORMAL
PH, POC UA: 5.5
PROTEIN, POC: NORMAL
SPECIFIC GRAVITY, POC UA: 1010
UROBILINOGEN, POC UA: NORMAL

## 2019-05-09 PROCEDURE — 99213 OFFICE O/P EST LOW 20 MIN: CPT | Mod: 25,S$GLB,, | Performed by: UROLOGY

## 2019-05-09 PROCEDURE — 3008F BODY MASS INDEX DOCD: CPT | Mod: S$GLB,,, | Performed by: UROLOGY

## 2019-05-09 PROCEDURE — 81002 POCT URINE DIPSTICK WITHOUT MICROSCOPE: ICD-10-PCS | Mod: S$GLB,,, | Performed by: UROLOGY

## 2019-05-09 PROCEDURE — 99213 PR OFFICE/OUTPT VISIT, EST, LEVL III, 20-29 MIN: ICD-10-PCS | Mod: 25,S$GLB,, | Performed by: UROLOGY

## 2019-05-09 PROCEDURE — 81002 URINALYSIS NONAUTO W/O SCOPE: CPT | Mod: S$GLB,,, | Performed by: UROLOGY

## 2019-05-09 PROCEDURE — 99999 PR PBB SHADOW E&M-EST. PATIENT-LVL III: CPT | Mod: PBBFAC,,, | Performed by: UROLOGY

## 2019-05-09 PROCEDURE — 99999 PR PBB SHADOW E&M-EST. PATIENT-LVL III: ICD-10-PCS | Mod: PBBFAC,,, | Performed by: UROLOGY

## 2019-05-09 PROCEDURE — 3008F PR BODY MASS INDEX (BMI) DOCUMENTED: ICD-10-PCS | Mod: S$GLB,,, | Performed by: UROLOGY

## 2019-05-09 RX ORDER — LANOLIN ALCOHOL/MO/W.PET/CERES
CREAM (GRAM) TOPICAL
Refills: 5 | Status: ON HOLD | COMMUNITY
Start: 2019-04-17 | End: 2020-11-19 | Stop reason: HOSPADM

## 2019-05-09 RX ORDER — PENCICLOVIR 10 MG/G
CREAM TOPICAL
Refills: 0 | Status: ON HOLD | COMMUNITY
Start: 2019-04-17 | End: 2020-11-19 | Stop reason: HOSPADM

## 2019-05-09 RX ORDER — ACYCLOVIR 400 MG/1
TABLET ORAL
Refills: 3 | Status: ON HOLD | COMMUNITY
Start: 2019-04-17 | End: 2020-09-04 | Stop reason: ALTCHOICE

## 2019-05-09 RX ORDER — BUTALBITAL AND ACETAMINOPHEN 325; 50 MG/1; MG/1
TABLET ORAL
Refills: 4 | Status: ON HOLD | COMMUNITY
Start: 2019-04-24 | End: 2020-09-04 | Stop reason: ALTCHOICE

## 2019-05-09 NOTE — PROGRESS NOTES
Subjective:       Patient ID: Elda Hernandez is a 48 y.o. female.    Chief Complaint: Follow-up (renal mass)    HPI     48 year old with previous liver transplant.  She gets regular liver transplant ultrasound.  Recent US noted a lesion with a mural calcification.  She had previously seen by Dr. Zheng years ago and she   Had annual CT scans to monitor a complex cyst on the right kidney.  CT scan report from 2/04 noted a 10 mm lesion.  The most recent US measured the lesion at 12mm.  Repeat CT scan noted a 12 mm lesion with more concerning description (12 x 12 x 13 mm partially calcified indeterminate hypodense nodule arising from the posterolateral cortex at the junction of the mid and lower pole of the right kidney.  This appears to demonstrate enhancement ) I reviewed the images with her.   We discussed treatment options.  Partial nephrectomy, cryotherapy vs continues observation.      Review of Systems   Constitutional: Negative for fever.   Genitourinary: Negative for dysuria and hematuria.       Objective:      Physical Exam   Constitutional: She is oriented to person, place, and time. She appears well-developed and well-nourished.   Pulmonary/Chest: Effort normal. No respiratory distress.   Neurological: She is alert and oriented to person, place, and time.   Skin: Skin is warm.   Psychiatric: She has a normal mood and affect. Her behavior is normal.   Vitals reviewed.      Assessment:       1. Renal mass        Plan:       Renal mass  -     POCT URINE DIPSTICK WITHOUT MICROSCOPE      Small equivocal lesion.  I feel observation is reasonable.  RTC 6 months

## 2019-05-12 DIAGNOSIS — Z94.4 LIVER REPLACED BY TRANSPLANT: ICD-10-CM

## 2019-05-13 ENCOUNTER — TELEPHONE (OUTPATIENT)
Dept: PHYSICAL MEDICINE AND REHAB | Facility: CLINIC | Age: 49
End: 2019-05-13

## 2019-05-13 RX ORDER — TACROLIMUS 1 MG/1
CAPSULE ORAL
Qty: 180 CAPSULE | Refills: 0 | Status: SHIPPED | OUTPATIENT
Start: 2019-05-13 | End: 2019-06-17

## 2019-05-13 NOTE — TELEPHONE ENCOUNTER
Left msg on machine that I was calling to see exactly what is bothering her in case we needed xray prior to her visit

## 2019-05-14 ENCOUNTER — TELEPHONE (OUTPATIENT)
Dept: PHYSICAL MEDICINE AND REHAB | Facility: CLINIC | Age: 49
End: 2019-05-14

## 2019-05-14 ENCOUNTER — PATIENT MESSAGE (OUTPATIENT)
Dept: PHYSICAL MEDICINE AND REHAB | Facility: CLINIC | Age: 49
End: 2019-05-14

## 2019-05-14 NOTE — TELEPHONE ENCOUNTER
----- Message from Dianelys Dale sent at 5/14/2019  2:39 PM CDT -----  Contact: Elda Doran pt  Type:  Patient Returning Call    Who Called:  Rosy  Who Left Message for Patient:  Mamta  Does the patient know what this is regarding?:  Info needed before appt  Best Call Back Number:  [phone]  Additional Information:  Pls call pt to adv. She thinks she had carpal tunnel. She is unable to write, they feel fat, a lot of tingling

## 2019-05-21 ENCOUNTER — OFFICE VISIT (OUTPATIENT)
Dept: PHYSICAL MEDICINE AND REHAB | Facility: CLINIC | Age: 49
End: 2019-05-21
Payer: MEDICARE

## 2019-05-21 VITALS
WEIGHT: 152 LBS | HEIGHT: 59 IN | HEART RATE: 78 BPM | BODY MASS INDEX: 30.64 KG/M2 | DIASTOLIC BLOOD PRESSURE: 62 MMHG | SYSTOLIC BLOOD PRESSURE: 135 MMHG

## 2019-05-21 DIAGNOSIS — G56.03 CARPAL TUNNEL SYNDROME ON BOTH SIDES: Primary | ICD-10-CM

## 2019-05-21 PROCEDURE — 3008F PR BODY MASS INDEX (BMI) DOCUMENTED: ICD-10-PCS | Mod: S$GLB,,, | Performed by: PHYSICAL MEDICINE & REHABILITATION

## 2019-05-21 PROCEDURE — 76942 CARPAL TUNNEL: R INTERCARPAL, L INTERCARPAL: ICD-10-PCS | Mod: 26,59,S$GLB, | Performed by: PHYSICAL MEDICINE & REHABILITATION

## 2019-05-21 PROCEDURE — 99999 PR PBB SHADOW E&M-EST. PATIENT-LVL III: CPT | Mod: PBBFAC,,, | Performed by: PHYSICAL MEDICINE & REHABILITATION

## 2019-05-21 PROCEDURE — 76882 PR  US,EXTREMITY,NONVASCULAR,REAL-TIME IMAGE,LIMITED: ICD-10-PCS | Mod: S$GLB,,, | Performed by: PHYSICAL MEDICINE & REHABILITATION

## 2019-05-21 PROCEDURE — 99204 PR OFFICE/OUTPT VISIT, NEW, LEVL IV, 45-59 MIN: ICD-10-PCS | Mod: 25,S$GLB,, | Performed by: PHYSICAL MEDICINE & REHABILITATION

## 2019-05-21 PROCEDURE — 99999 PR PBB SHADOW E&M-EST. PATIENT-LVL III: ICD-10-PCS | Mod: PBBFAC,,, | Performed by: PHYSICAL MEDICINE & REHABILITATION

## 2019-05-21 PROCEDURE — 99204 OFFICE O/P NEW MOD 45 MIN: CPT | Mod: 25,S$GLB,, | Performed by: PHYSICAL MEDICINE & REHABILITATION

## 2019-05-21 PROCEDURE — 3008F BODY MASS INDEX DOCD: CPT | Mod: S$GLB,,, | Performed by: PHYSICAL MEDICINE & REHABILITATION

## 2019-05-21 PROCEDURE — 20526 THER INJECTION CARP TUNNEL: CPT | Mod: 50,S$GLB,, | Performed by: PHYSICAL MEDICINE & REHABILITATION

## 2019-05-21 PROCEDURE — 20526 PR INJECT CARPAL TUNNEL: ICD-10-PCS | Mod: 50,S$GLB,, | Performed by: PHYSICAL MEDICINE & REHABILITATION

## 2019-05-21 PROCEDURE — 76882 US LMTD JT/FCL EVL NVASC XTR: CPT | Mod: S$GLB,,, | Performed by: PHYSICAL MEDICINE & REHABILITATION

## 2019-05-21 PROCEDURE — 3078F PR MOST RECENT DIASTOLIC BLOOD PRESSURE < 80 MM HG: ICD-10-PCS | Mod: S$GLB,,, | Performed by: PHYSICAL MEDICINE & REHABILITATION

## 2019-05-21 PROCEDURE — 76942 ECHO GUIDE FOR BIOPSY: CPT | Mod: 26,59,S$GLB, | Performed by: PHYSICAL MEDICINE & REHABILITATION

## 2019-05-21 PROCEDURE — 3075F PR MOST RECENT SYSTOLIC BLOOD PRESS GE 130-139MM HG: ICD-10-PCS | Mod: S$GLB,,, | Performed by: PHYSICAL MEDICINE & REHABILITATION

## 2019-05-21 PROCEDURE — 3075F SYST BP GE 130 - 139MM HG: CPT | Mod: S$GLB,,, | Performed by: PHYSICAL MEDICINE & REHABILITATION

## 2019-05-21 PROCEDURE — 3078F DIAST BP <80 MM HG: CPT | Mod: S$GLB,,, | Performed by: PHYSICAL MEDICINE & REHABILITATION

## 2019-05-21 RX ORDER — BETAMETHASONE SODIUM PHOSPHATE AND BETAMETHASONE ACETATE 3; 3 MG/ML; MG/ML
6 INJECTION, SUSPENSION INTRA-ARTICULAR; INTRALESIONAL; INTRAMUSCULAR; SOFT TISSUE
Status: DISCONTINUED | OUTPATIENT
Start: 2019-05-21 | End: 2019-05-21 | Stop reason: HOSPADM

## 2019-05-21 RX ADMIN — BETAMETHASONE SODIUM PHOSPHATE AND BETAMETHASONE ACETATE 6 MG: 3; 3 INJECTION, SUSPENSION INTRA-ARTICULAR; INTRALESIONAL; INTRAMUSCULAR; SOFT TISSUE at 02:05

## 2019-05-21 NOTE — PROCEDURES
Carpal Tunnel: R intercarpal, L intercarpal  Date/Time: 5/21/2019 2:16 PM  Performed by: Mauricio Lindo MD  Authorized by: Mauricio Lindo MD     Consent Done?:  Yes (Verbal)  Indications:  Pain  Site marked: The procedure site was marked    Timeout: Prior to procedure the correct patient, procedure, and site was verified      Location:  Wrist  Site:  R intercarpal and L intercarpal  Prep: Patient was prepped and draped in usual sterile fashion    Ultrasonic Guidance for needle placement: Yes  Images are saved and documented.  Needle size:  27 G  Approach: needle in plane, lateral to medial.  Medications:  6 mg betamethasone acetate-betamethasone sodium phosphate 6 mg/mL; 6 mg betamethasone acetate-betamethasone sodium phosphate 6 mg/mL  Patient tolerance:  Patient tolerated the procedure well with no immediate complications     Additional Comments: Ultrasound guidance was used for correct needle placement, the images were saved will be uploaded to EMR.

## 2019-05-21 NOTE — PROGRESS NOTES
[facility]    CHIEF COMPLAINT:   Chief Complaint   Patient presents with    Wrist Pain     HISTORY OF PRESENT ILLNESS: Elda Hernandez is a 49 y.o. female who presents to me as a new patient for evaluation of possible carpal tunnel syndrome.    Patient reports bilateral wrist pain with whole hand numbness for 3-4 months. No trauma. She is right handed, right side is worse. No neck pain. Has peripheral neuropathy and is s/p liver transplant. NCS of BLE in the past, never BUE. Flick sign and dropping things. Describes neuropathic pain in the hands with some radiation on the right proximally. Denies old hand injuries or surgeries. Taking gabapentin 300mg TID for neuropathy and using 's CTS splints. Never had injections.    Review of Systems   Constitutional: Negative for fever.   HENT: Negative for drooling.    Eyes: Negative for discharge.   Respiratory: Negative for choking.    Cardiovascular: Negative for chest pain.   Genitourinary: Negative for flank pain.   Skin: Negative for wound.   Allergic/Immunologic: Negative for immunocompromised state.   Neurological: Negative for tremors and syncope.   Psychiatric/Behavioral: Negative for behavioral problems.     Past Medical History:   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    Arnold-Chiari malformation     Depression     Esophageal stricture     Essential tremor     Hypertension     Left bundle branch block     Liver fibrosis, transplanted liver 10/2/2018    Suggested on fibroscan 10/2/18    Migraine without aura     MVP (mitral valve prolapse)     Non-rheumatic mitral regurgitation 10/1/2018    Non-rheumatic tricuspid valve insufficiency 10/1/2018    Osteoporosis     Recurrent urinary tract infection     Seizures     Shingles 2007    SIADH (syndrome of inappropriate ADH production)     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    Tricuspid valve prolapse     Urolithiasis     Von  Gierke disease     s/p liver transplant       Past Surgical History:   Past Surgical History:   Procedure Laterality Date    APPENDECTOMY  6/22/2007    BIOPSY-LIVER N/A 9/14/2015    [facility]formed [facility] OR 2ND FLR    BIOPSY-LIVER- ULTRASOUND GUIDED N/A 4/23/2015    Per[facility] by [facility]    COLONOSCOPY  5/13/2008    internal hemorrhoids    COLONOSCOPY N/A 10/18/2013    Performed by Kody Chambers MD at [facility] ENDO (2ND FLR)    CRANIOTOMY      ERCP N/A 4/7/2015    Performed by Maximino Sharma MD at [facility] ENDO (2ND FLR)    ERCP N/A 3/20/2015    Performed by James West MD at [facility] ENDO (2ND FLR)    ERCP N/A 1/14/2015    Performed by James West MD at [facility] ENDO (2ND FLR)    LAMINECTOMY  3/2001    LIVER TRANSPLANT  9/23/2002    OSSICULAR RECONSTRUCTION  10/4/1995    RIGHT REPLACEMENT PROSTHESIS for cholesteatoma    SIGMOIDOSCOPY-FLEXIBLE N/A 4/21/2015    Performed by Carroll Cheema MD at [facility] ENDO (2ND FLR)    THYMECTOMY  5/2/2007    TONSILLECTOMY, ADENOIDECTOMY  1/21/2004    TOTAL ABDOMINAL HYSTERECTOMY  3/31/1994       Family History: History reviewed. No pertinent family history.    Medications:   Current Outpatient Medications on File Prior to Visit   Medication Sig Dispense Refill    acyclovir (ZOVIRAX) 400 MG tablet TK 1 T PO FID FOR 5 DAYS  3    amitriptyline (ELAVIL) 75 MG tablet TK 1 T PO  QD  0    butalbital-acetaminophen  mg Tab TK 1 T PO Q 4 H PRN. NTE 6 H  4    calcium carbonate (OS-JASON) 600 mg (1,500 mg) Tab Take 600 mg by mouth 2 (two) times daily with meals.      clonazePAM (KLONOPIN) 0.5 MG tablet Take 0.5 mg by mouth 2 (two) times daily as needed for Anxiety.      demeclocycline (DECLOMYCIN) 150 MG Tab Take 150 mg by mouth every 12 (twelve) hours.      DENAVIR 1 % cream RICHAR EXT AA Q 2 H FOR 4 DAYS  0    ergocalciferol (ERGOCALCIFEROL) 50,000 unit Cap Take 50,000 Units by mouth every 7 days.      gabapentin  (NEURONTIN) 300 MG capsule Take 300 mg by mouth 3 (three) times daily.      hydrOXYzine HCl (ATARAX) 25 MG tablet Take 1 tablet (25 mg total) by mouth every 8 (eight) hours as needed for Itching. 90 tablet 1    lactulose (CHRONULAC) 10 gram/15 mL solution TAKE 20 ML BY MOUTH TWICE DAILY 3645 mL 0    levothyroxine (SYNTHROID) 75 MCG tablet Take 75 mcg by mouth once daily.      lisinopril (PRINIVIL,ZESTRIL) 40 MG tablet TK 1 T PO QD  5    magnesium oxide (MAG-OX) 400 mg (241.3 mg magnesium) tablet TK 2 TS PO BID  5    multivitamin capsule Take 1 capsule by mouth once daily.      mycophenolate (CELLCEPT) 250 mg Cap Take 2 capsules (500 mg total) by mouth 2 (two) times daily. 120 capsule 6    ondansetron (ZOFRAN) 8 MG tablet TAKE 1 TABLET(8 MG) BY MOUTH EVERY 8 HOURS AS NEEDED FOR NAUSEA 30 tablet 0    pantoprazole (PROTONIX) 40 MG tablet Take 1 tablet (40 mg total) by mouth 2 (two) times daily. 60 tablet 2    potassium chloride (KLOR-CON) 10 MEQ TbSR TAKE 2 TABLETS(20 MEQ) BY MOUTH EVERY DAY 60 tablet 0    promethazine (PHENERGAN) 25 MG tablet Take 25 mg by mouth every 4 (four) hours as needed (if unrelieved by zofran).   5    saliva stimulant agents comb.3 (BIOTENE MOISTURIZING MOUTH) Spry by Mucous Membrane route daily as needed.      tacrolimus (PROGRAF) 1 MG Cap TAKE ONE CAPSULE BY MOUTH EVERY 12 HOURS 180 capsule 0    triamcinolone acetonide 0.1% (KENALOG) 0.1 % cream Apply topically 2 (two) times daily. 454 g 1    venlafaxine (EFFEXOR-XR) 150 MG Cp24 TK 1 C PO QD WF  1     No current facility-administered medications on file prior to visit.        Allergies:   Review of patient's allergies indicates:   Allergen Reactions    Codeine Itching     Other reaction(s): Itching    Lipitor [atorvastatin] Other (See Comments)     Other reaction(s): Muscle pain  Muscle cranmps    Morphine Itching     Other reaction(s): nausea and vomiting     Zoloft [sertraline] Other (See Comments)     Tremors/muscle  "spasms       Social History:   Social History     Socioeconomic History    Marital status:      Spouse name: Not on file    Number of children: Not on file    Years of education: Not on file    Highest education level: Not on file   Occupational History    Not on file   Social Needs    Financial resource strain: Not on file    Food insecurity:     Worry: Not on file     Inability: Not on file    Transportation needs:     Medical: Not on file     Non-medical: Not on file   Tobacco Use    Smoking status: Never Smoker   Substance and Sexual Activity    Alcohol use: No    Drug use: No    Sexual activity: Not on file   Lifestyle    Physical activity:     Days per week: Not on file     Minutes per session: Not on file    Stress: Not on file   Relationships    Social connections:     Talks on phone: Not on file     Gets together: Not on file     Attends Sabianist service: Not on file     Active member of club or organization: Not on file     Attends meetings of clubs or organizations: Not on file     Relationship status: Not on file   Other Topics Concern    Not on file   Social History Narrative    Not on file     Elda Doran is disabled due to liver failure.    PHYSICAL EXAMINATION:   General    Vitals:    05/21/19 1337   BP: 135/62   Pulse: 78   Weight: 68.9 kg (152 lb)   Height: 4' 11" (1.499 m)     Constitutional: Oriented to person, place, and time. No apparent distress. Appears well-developed and well-nourished. Pleasant.  HENT:   Head: Normocephalic and atraumatic.   Eyes: Right eye exhibits no discharge. Left eye exhibits no discharge. No scleral icterus.   Pulmonary/Chest: Effort normal. No respiratory distress.   Abdominal: There is no guarding.   Neurological: Alert and oriented to person, place, and time.   Psychiatric: Behavior is normal.   Right Hand Exam     Tenderness   The patient is experiencing tenderness in the [place] area.    Range of Motion   Wrist   Extension: 45   Flexion: 90 "     Muscle Strength   The patient has normal right wrist strength.  Wrist extension: 5/5   Wrist flexion: 5/5   : 5/5     Tests   Phalens sign: positive  Tinel's sign (median nerve): positive    Other   Erythema: absent  Scars: absent  Sensation: normal  Pulse: present      Left Hand Exam     Tenderness   The patient is experiencing tenderness in the [place] area.     Range of Motion   Wrist   Extension: 45   Flexion: 90     Muscle Strength   The patient has normal left wrist strength.  Wrist extension: 5/5   Wrist flexion: 5/5   :  5/5     Tests   Phalens sign: positive  Tinel's sign (median nerve): positive    Other   Erythema: absent  Scars: absent  Sensation: normal  Pulse: present        INSPECTION: There is no swelling, ecchymoses, erythema or gross deformity about the bilateral hands.    Diagnostic ultrasound exam:  Limited diagnostic exam was performed today of the bilateral median nerves.  The images were saved will be uploaded to EMR.  On the right, the median nerve was observed in its short axis at the level of the forearm.  The nerve was tracked distally into the hand.  There were no observed compressive lesions or masses.  The cross-sectional area of the median nerve at the level of the carpal tunnel inlet measured 0.12 centimeters squared.  There was no observed notching when observing the nerve in long axis at the level of the transverse carpal ligament.    On the left, the median nerve was observed in its short axis at the level of the forearm.  The nerve was tracked distally into the hand.  There were no observed compressive lesions or masses.  The cross-sectional area of the median nerve at the level of the carpal tunnel inlet also measured 0.12 centimeters squared.  There was no observed notching when observing the nerve in long axis at the level of the transverse carpal ligament.    ASSESSMENT:   1. Carpal tunnel syndrome on both sides      PLAN:     1. Time was spent reviewing the above  diagnosis in depth with Elda Doran today, including acute management and rehabilitation.     2.  Her signs, symptoms, and diagnostic ultrasound exam findings are all consistent with a diagnosis of bilateral carpal tunnel syndrome.  I recommended conservative treatment in the form of night splints and injection therapy.  She already has night splints encouraged her to continue use of these. She wished to move forward with injections today.  Bilateral carpal tunnel injections today with corticosteroid and median nerve hydrodissection under ultrasound guidance. See procedure note.    3. Continue CTS splints.     4. If no relief with above, consider BUE NCS/EMG.    5. RTC in 6 weeks if symptoms persist or worsen.    The above note was completed, in part, with the aid of Dragon dictation software/hardware. Translation errors may be present.

## 2019-06-15 ENCOUNTER — LAB VISIT (OUTPATIENT)
Dept: LAB | Facility: HOSPITAL | Age: 49
End: 2019-06-15
Attending: INTERNAL MEDICINE
Payer: MEDICARE

## 2019-06-15 DIAGNOSIS — Z94.4 LIVER REPLACED BY TRANSPLANT: ICD-10-CM

## 2019-06-15 LAB
BASOPHILS # BLD AUTO: 0.05 K/UL (ref 0–0.2)
BASOPHILS NFR BLD: 1.3 % (ref 0–1.9)
DIFFERENTIAL METHOD: ABNORMAL
EOSINOPHIL # BLD AUTO: 0.3 K/UL (ref 0–0.5)
EOSINOPHIL NFR BLD: 6.5 % (ref 0–8)
ERYTHROCYTE [DISTWIDTH] IN BLOOD BY AUTOMATED COUNT: 15.6 % (ref 11.5–14.5)
HCT VFR BLD AUTO: 36.7 % (ref 37–48.5)
HGB BLD-MCNC: 11.2 G/DL (ref 12–16)
IMM GRANULOCYTES # BLD AUTO: 0 K/UL (ref 0–0.04)
IMM GRANULOCYTES NFR BLD AUTO: 0 % (ref 0–0.5)
INR PPP: 1.1 (ref 0.8–1.2)
LYMPHOCYTES # BLD AUTO: 0.8 K/UL (ref 1–4.8)
LYMPHOCYTES NFR BLD: 20 % (ref 18–48)
MCH RBC QN AUTO: 26.8 PG (ref 27–31)
MCHC RBC AUTO-ENTMCNC: 30.5 G/DL (ref 32–36)
MCV RBC AUTO: 88 FL (ref 82–98)
MONOCYTES # BLD AUTO: 0.5 K/UL (ref 0.3–1)
MONOCYTES NFR BLD: 11.7 % (ref 4–15)
NEUTROPHILS # BLD AUTO: 2.3 K/UL (ref 1.8–7.7)
NEUTROPHILS NFR BLD: 60.5 % (ref 38–73)
NRBC BLD-RTO: 0 /100 WBC
PLATELET # BLD AUTO: 142 K/UL (ref 150–350)
PMV BLD AUTO: 12.7 FL (ref 9.2–12.9)
PROTHROMBIN TIME: 11.1 SEC (ref 9–12.5)
RBC # BLD AUTO: 4.18 M/UL (ref 4–5.4)
WBC # BLD AUTO: 3.85 K/UL (ref 3.9–12.7)

## 2019-06-15 PROCEDURE — 36415 COLL VENOUS BLD VENIPUNCTURE: CPT | Mod: PO

## 2019-06-15 PROCEDURE — 80197 ASSAY OF TACROLIMUS: CPT

## 2019-06-15 PROCEDURE — 85610 PROTHROMBIN TIME: CPT | Mod: PO

## 2019-06-15 PROCEDURE — 85025 COMPLETE CBC W/AUTO DIFF WBC: CPT

## 2019-06-15 PROCEDURE — 80053 COMPREHEN METABOLIC PANEL: CPT

## 2019-06-15 PROCEDURE — 82105 ALPHA-FETOPROTEIN SERUM: CPT

## 2019-06-16 LAB
AFP SERPL-MCNC: 2.5 NG/ML (ref 0–8.4)
ALBUMIN SERPL BCP-MCNC: 3.5 G/DL (ref 3.5–5.2)
ALP SERPL-CCNC: 304 U/L (ref 55–135)
ALT SERPL W/O P-5'-P-CCNC: 43 U/L (ref 10–44)
ANION GAP SERPL CALC-SCNC: 10 MMOL/L (ref 8–16)
AST SERPL-CCNC: 52 U/L (ref 10–40)
BILIRUB SERPL-MCNC: 0.7 MG/DL (ref 0.1–1)
BUN SERPL-MCNC: 18 MG/DL (ref 6–20)
CALCIUM SERPL-MCNC: 9.5 MG/DL (ref 8.7–10.5)
CHLORIDE SERPL-SCNC: 105 MMOL/L (ref 95–110)
CO2 SERPL-SCNC: 24 MMOL/L (ref 23–29)
CREAT SERPL-MCNC: 0.9 MG/DL (ref 0.5–1.4)
EST. GFR  (AFRICAN AMERICAN): >60 ML/MIN/1.73 M^2
EST. GFR  (NON AFRICAN AMERICAN): >60 ML/MIN/1.73 M^2
GLUCOSE SERPL-MCNC: 76 MG/DL (ref 70–110)
POTASSIUM SERPL-SCNC: 3.9 MMOL/L (ref 3.5–5.1)
PROT SERPL-MCNC: 6.9 G/DL (ref 6–8.4)
SODIUM SERPL-SCNC: 139 MMOL/L (ref 136–145)
TACROLIMUS BLD-MCNC: 2.8 NG/ML (ref 5–15)

## 2019-06-17 DIAGNOSIS — Z94.4 LIVER REPLACED BY TRANSPLANT: ICD-10-CM

## 2019-06-17 RX ORDER — TACROLIMUS 1 MG/1
CAPSULE ORAL
Qty: 90 CAPSULE | Refills: 11 | Status: SHIPPED | OUTPATIENT
Start: 2019-06-17 | End: 2020-07-29

## 2019-06-17 NOTE — TELEPHONE ENCOUNTER
Labs reviewed by Dr. Nielsen  Sent patient message to let her know that labs are stable, but Prograf level is low.  Please increase your Prograf to 2 mg in the morning and 1 mg in the evening.  Repeat labs on 07/20/19 to recheck.

## 2019-07-16 ENCOUNTER — HOSPITAL ENCOUNTER (OUTPATIENT)
Dept: RADIOLOGY | Facility: HOSPITAL | Age: 49
Discharge: HOME | End: 2019-07-16
Attending: OBSTETRICS & GYNECOLOGY
Payer: MEDICARE

## 2019-07-16 DIAGNOSIS — N83.202 BILATERAL OVARIAN CYSTS: ICD-10-CM

## 2019-07-16 DIAGNOSIS — R87.622 LOW GRADE SQUAMOUS INTRAEPITH LESION ON CYTOLOGIC SMEAR VAGINA (LGSIL): ICD-10-CM

## 2019-07-16 DIAGNOSIS — N83.201 BILATERAL OVARIAN CYSTS: ICD-10-CM

## 2019-07-16 PROCEDURE — 76856 US PELVIS COMPLETE NON OB: ICD-10-PCS | Mod: 26,,, | Performed by: RADIOLOGY

## 2019-07-16 PROCEDURE — 76856 US EXAM PELVIC COMPLETE: CPT | Mod: 26,,, | Performed by: RADIOLOGY

## 2019-07-16 PROCEDURE — 76856 US EXAM PELVIC COMPLETE: CPT | Mod: TC,PO

## 2019-07-20 ENCOUNTER — PATIENT MESSAGE (OUTPATIENT)
Dept: TRANSPLANT | Facility: CLINIC | Age: 49
End: 2019-07-20

## 2019-07-22 ENCOUNTER — LAB VISIT (OUTPATIENT)
Dept: LAB | Facility: HOSPITAL | Age: 49
End: 2019-07-22
Attending: INTERNAL MEDICINE
Payer: MEDICARE

## 2019-07-22 DIAGNOSIS — N83.201 BILATERAL OVARIAN CYSTS: ICD-10-CM

## 2019-07-22 DIAGNOSIS — Z94.4 LIVER REPLACED BY TRANSPLANT: ICD-10-CM

## 2019-07-22 DIAGNOSIS — R87.622 PAPANICOLAOU SMEAR OF VAGINA WITH LOW GRADE SQUAMOUS INTRAEPITHELIAL LESION (LGSIL): Primary | ICD-10-CM

## 2019-07-22 DIAGNOSIS — N83.202 BILATERAL OVARIAN CYSTS: ICD-10-CM

## 2019-07-22 DIAGNOSIS — L29.9 PRURITUS: ICD-10-CM

## 2019-07-22 LAB
AFP SERPL-MCNC: 2.8 NG/ML (ref 0–8.4)
ALBUMIN SERPL BCP-MCNC: 3.3 G/DL (ref 3.5–5.2)
ALBUMIN SERPL BCP-MCNC: 3.3 G/DL (ref 3.5–5.2)
ALP SERPL-CCNC: 287 U/L (ref 55–135)
ALP SERPL-CCNC: 287 U/L (ref 55–135)
ALT SERPL W/O P-5'-P-CCNC: 42 U/L (ref 10–44)
ALT SERPL W/O P-5'-P-CCNC: 42 U/L (ref 10–44)
ANION GAP SERPL CALC-SCNC: 8 MMOL/L (ref 8–16)
ANION GAP SERPL CALC-SCNC: 8 MMOL/L (ref 8–16)
AST SERPL-CCNC: 58 U/L (ref 10–40)
AST SERPL-CCNC: 58 U/L (ref 10–40)
BASOPHILS # BLD AUTO: 0.04 K/UL (ref 0–0.2)
BASOPHILS NFR BLD: 1.7 % (ref 0–1.9)
BILIRUB SERPL-MCNC: 0.4 MG/DL (ref 0.1–1)
BILIRUB SERPL-MCNC: 0.4 MG/DL (ref 0.1–1)
BUN SERPL-MCNC: 15 MG/DL (ref 6–20)
BUN SERPL-MCNC: 15 MG/DL (ref 6–20)
CALCIUM SERPL-MCNC: 9.5 MG/DL (ref 8.7–10.5)
CALCIUM SERPL-MCNC: 9.5 MG/DL (ref 8.7–10.5)
CANCER AG125 SERPL-ACNC: 62 U/ML (ref 0–30)
CHLORIDE SERPL-SCNC: 101 MMOL/L (ref 95–110)
CHLORIDE SERPL-SCNC: 101 MMOL/L (ref 95–110)
CO2 SERPL-SCNC: 27 MMOL/L (ref 23–29)
CO2 SERPL-SCNC: 27 MMOL/L (ref 23–29)
CREAT SERPL-MCNC: 0.9 MG/DL (ref 0.5–1.4)
CREAT SERPL-MCNC: 0.9 MG/DL (ref 0.5–1.4)
DIFFERENTIAL METHOD: ABNORMAL
EOSINOPHIL # BLD AUTO: 0.2 K/UL (ref 0–0.5)
EOSINOPHIL NFR BLD: 8.3 % (ref 0–8)
ERYTHROCYTE [DISTWIDTH] IN BLOOD BY AUTOMATED COUNT: 15.5 % (ref 11.5–14.5)
EST. GFR  (AFRICAN AMERICAN): >60 ML/MIN/1.73 M^2
EST. GFR  (AFRICAN AMERICAN): >60 ML/MIN/1.73 M^2
EST. GFR  (NON AFRICAN AMERICAN): >60 ML/MIN/1.73 M^2
EST. GFR  (NON AFRICAN AMERICAN): >60 ML/MIN/1.73 M^2
GLUCOSE SERPL-MCNC: 79 MG/DL (ref 70–110)
GLUCOSE SERPL-MCNC: 79 MG/DL (ref 70–110)
HCT VFR BLD AUTO: 36 % (ref 37–48.5)
HGB BLD-MCNC: 11.3 G/DL (ref 12–16)
IMM GRANULOCYTES # BLD AUTO: 0.01 K/UL (ref 0–0.04)
IMM GRANULOCYTES NFR BLD AUTO: 0.4 % (ref 0–0.5)
INR PPP: 1.1 (ref 0.8–1.2)
LYMPHOCYTES # BLD AUTO: 0.7 K/UL (ref 1–4.8)
LYMPHOCYTES NFR BLD: 30.7 % (ref 18–48)
MCH RBC QN AUTO: 27 PG (ref 27–31)
MCHC RBC AUTO-ENTMCNC: 31.4 G/DL (ref 32–36)
MCV RBC AUTO: 86 FL (ref 82–98)
MONOCYTES # BLD AUTO: 0.3 K/UL (ref 0.3–1)
MONOCYTES NFR BLD: 11.6 % (ref 4–15)
NEUTROPHILS # BLD AUTO: 1.1 K/UL (ref 1.8–7.7)
NEUTROPHILS NFR BLD: 47.3 % (ref 38–73)
NRBC BLD-RTO: 0 /100 WBC
PLATELET # BLD AUTO: 99 K/UL (ref 150–350)
PMV BLD AUTO: 12.9 FL (ref 9.2–12.9)
POTASSIUM SERPL-SCNC: 4.6 MMOL/L (ref 3.5–5.1)
POTASSIUM SERPL-SCNC: 4.6 MMOL/L (ref 3.5–5.1)
PROT SERPL-MCNC: 6.9 G/DL (ref 6–8.4)
PROT SERPL-MCNC: 6.9 G/DL (ref 6–8.4)
PROTHROMBIN TIME: 10.8 SEC (ref 9–12.5)
RBC # BLD AUTO: 4.18 M/UL (ref 4–5.4)
SODIUM SERPL-SCNC: 136 MMOL/L (ref 136–145)
SODIUM SERPL-SCNC: 136 MMOL/L (ref 136–145)
WBC # BLD AUTO: 2.41 K/UL (ref 3.9–12.7)

## 2019-07-22 PROCEDURE — 86304 IMMUNOASSAY TUMOR CA 125: CPT

## 2019-07-22 PROCEDURE — 80197 ASSAY OF TACROLIMUS: CPT

## 2019-07-22 PROCEDURE — 80053 COMPREHEN METABOLIC PANEL: CPT

## 2019-07-22 PROCEDURE — 82105 ALPHA-FETOPROTEIN SERUM: CPT

## 2019-07-22 PROCEDURE — 36415 COLL VENOUS BLD VENIPUNCTURE: CPT | Mod: PO

## 2019-07-22 PROCEDURE — 85025 COMPLETE CBC W/AUTO DIFF WBC: CPT

## 2019-07-22 PROCEDURE — 85610 PROTHROMBIN TIME: CPT | Mod: PO

## 2019-07-23 ENCOUNTER — HOSPITAL ENCOUNTER (OUTPATIENT)
Dept: RADIOLOGY | Facility: HOSPITAL | Age: 49
Discharge: HOME | End: 2019-07-23
Attending: OBSTETRICS & GYNECOLOGY
Payer: MEDICARE

## 2019-07-23 DIAGNOSIS — N60.19: ICD-10-CM

## 2019-07-23 LAB — TACROLIMUS BLD-MCNC: 6.3 NG/ML (ref 5–15)

## 2019-07-23 PROCEDURE — 77062 BREAST TOMOSYNTHESIS BI: CPT | Mod: 26,,, | Performed by: RADIOLOGY

## 2019-07-23 PROCEDURE — 77066 MAMMO DIGITAL DIAGNOSTIC BILAT WITH TOMOSYNTHESIS_CAD: ICD-10-PCS | Mod: 26,,, | Performed by: RADIOLOGY

## 2019-07-23 PROCEDURE — 77062 BREAST TOMOSYNTHESIS BI: CPT | Mod: TC,PO

## 2019-07-23 PROCEDURE — 77066 DX MAMMO INCL CAD BI: CPT | Mod: 26,,, | Performed by: RADIOLOGY

## 2019-07-23 PROCEDURE — 77062 MAMMO DIGITAL DIAGNOSTIC BILAT WITH TOMOSYNTHESIS_CAD: ICD-10-PCS | Mod: 26,,, | Performed by: RADIOLOGY

## 2019-07-24 ENCOUNTER — TELEPHONE (OUTPATIENT)
Dept: TRANSPLANT | Facility: CLINIC | Age: 49
End: 2019-07-24

## 2019-07-24 DIAGNOSIS — C22.0 HEPATOCELLULAR CARCINOMA: Primary | ICD-10-CM

## 2019-07-24 NOTE — TELEPHONE ENCOUNTER
----- Message from Jaya Nielsen MD sent at 7/23/2019  9:36 AM CDT -----  Results reviewed. Please advise labs are stable.

## 2019-07-24 NOTE — TELEPHONE ENCOUNTER
Labs reviewed by Dr. Nielsen  Continue routine labs no changes needed.  Letter sent for next lab appointment 10/19/19

## 2019-09-24 ENCOUNTER — HOSPITAL ENCOUNTER (OUTPATIENT)
Dept: RADIOLOGY | Facility: HOSPITAL | Age: 49
Discharge: HOME | End: 2019-09-24
Attending: INTERNAL MEDICINE
Payer: MEDICARE

## 2019-09-24 DIAGNOSIS — C22.0 HEPATOCELLULAR CARCINOMA: ICD-10-CM

## 2019-09-24 PROCEDURE — 76705 US LIVER WITH DOPPLER: ICD-10-PCS | Mod: 26,59,, | Performed by: RADIOLOGY

## 2019-09-24 PROCEDURE — 93975 VASCULAR STUDY: CPT | Mod: TC,PO

## 2019-09-24 PROCEDURE — 76705 ECHO EXAM OF ABDOMEN: CPT | Mod: TC,PO,59

## 2019-09-24 PROCEDURE — 76705 ECHO EXAM OF ABDOMEN: CPT | Mod: 26,59,, | Performed by: RADIOLOGY

## 2019-09-24 PROCEDURE — 93975 US LIVER WITH DOPPLER: ICD-10-PCS | Mod: 26,,, | Performed by: RADIOLOGY

## 2019-09-24 PROCEDURE — 93975 VASCULAR STUDY: CPT | Mod: 26,,, | Performed by: RADIOLOGY

## 2019-10-05 ENCOUNTER — LAB VISIT (OUTPATIENT)
Dept: LAB | Facility: HOSPITAL | Age: 49
End: 2019-10-05
Attending: INTERNAL MEDICINE
Payer: MEDICARE

## 2019-10-05 DIAGNOSIS — Z94.4 LIVER REPLACED BY TRANSPLANT: ICD-10-CM

## 2019-10-05 DIAGNOSIS — R87.622 PAPANICOLAOU SMEAR OF VAGINA WITH LOW GRADE SQUAMOUS INTRAEPITHELIAL LESION (LGSIL): ICD-10-CM

## 2019-10-05 DIAGNOSIS — N83.201 BILATERAL OVARIAN CYSTS: ICD-10-CM

## 2019-10-05 DIAGNOSIS — L29.9 PRURITUS: ICD-10-CM

## 2019-10-05 DIAGNOSIS — N83.202 BILATERAL OVARIAN CYSTS: ICD-10-CM

## 2019-10-05 LAB
AFP SERPL-MCNC: 3.6 NG/ML (ref 0–8.4)
ALBUMIN SERPL BCP-MCNC: 3.8 G/DL (ref 3.5–5.2)
ALP SERPL-CCNC: 306 U/L (ref 55–135)
ALT SERPL W/O P-5'-P-CCNC: 70 U/L (ref 10–44)
ANION GAP SERPL CALC-SCNC: 7 MMOL/L (ref 8–16)
AST SERPL-CCNC: 79 U/L (ref 10–40)
BASOPHILS # BLD AUTO: 0.04 K/UL (ref 0–0.2)
BASOPHILS NFR BLD: 1.1 % (ref 0–1.9)
BILIRUB SERPL-MCNC: 0.8 MG/DL (ref 0.1–1)
BUN SERPL-MCNC: 21 MG/DL (ref 6–20)
CALCIUM SERPL-MCNC: 9.6 MG/DL (ref 8.7–10.5)
CHLORIDE SERPL-SCNC: 105 MMOL/L (ref 95–110)
CO2 SERPL-SCNC: 25 MMOL/L (ref 23–29)
CREAT SERPL-MCNC: 0.8 MG/DL (ref 0.5–1.4)
DIFFERENTIAL METHOD: ABNORMAL
EOSINOPHIL # BLD AUTO: 0.3 K/UL (ref 0–0.5)
EOSINOPHIL NFR BLD: 8.6 % (ref 0–8)
ERYTHROCYTE [DISTWIDTH] IN BLOOD BY AUTOMATED COUNT: 14.5 % (ref 11.5–14.5)
EST. GFR  (AFRICAN AMERICAN): >60 ML/MIN/1.73 M^2
EST. GFR  (NON AFRICAN AMERICAN): >60 ML/MIN/1.73 M^2
GLUCOSE SERPL-MCNC: 82 MG/DL (ref 70–110)
HCT VFR BLD AUTO: 36.8 % (ref 37–48.5)
HGB BLD-MCNC: 12.1 G/DL (ref 12–16)
IMM GRANULOCYTES # BLD AUTO: 0.01 K/UL (ref 0–0.04)
IMM GRANULOCYTES NFR BLD AUTO: 0.3 % (ref 0–0.5)
INR PPP: 1.1 (ref 0.8–1.2)
LYMPHOCYTES # BLD AUTO: 1 K/UL (ref 1–4.8)
LYMPHOCYTES NFR BLD: 26.4 % (ref 18–48)
MCH RBC QN AUTO: 27.9 PG (ref 27–31)
MCHC RBC AUTO-ENTMCNC: 32.9 G/DL (ref 32–36)
MCV RBC AUTO: 85 FL (ref 82–98)
MONOCYTES # BLD AUTO: 0.6 K/UL (ref 0.3–1)
MONOCYTES NFR BLD: 15.1 % (ref 4–15)
NEUTROPHILS # BLD AUTO: 1.8 K/UL (ref 1.8–7.7)
NEUTROPHILS NFR BLD: 48.5 % (ref 38–73)
NRBC BLD-RTO: 0 /100 WBC
PLATELET # BLD AUTO: 106 K/UL (ref 150–350)
PMV BLD AUTO: 12.3 FL (ref 9.2–12.9)
POTASSIUM SERPL-SCNC: 3.8 MMOL/L (ref 3.5–5.1)
PROT SERPL-MCNC: 7.4 G/DL (ref 6–8.4)
PROTHROMBIN TIME: 10.7 SEC (ref 9–12.5)
RBC # BLD AUTO: 4.33 M/UL (ref 4–5.4)
SODIUM SERPL-SCNC: 137 MMOL/L (ref 136–145)
WBC # BLD AUTO: 3.71 K/UL (ref 3.9–12.7)

## 2019-10-05 PROCEDURE — 85025 COMPLETE CBC W/AUTO DIFF WBC: CPT

## 2019-10-05 PROCEDURE — 82105 ALPHA-FETOPROTEIN SERUM: CPT

## 2019-10-05 PROCEDURE — 80197 ASSAY OF TACROLIMUS: CPT

## 2019-10-05 PROCEDURE — 85610 PROTHROMBIN TIME: CPT | Mod: PO

## 2019-10-05 PROCEDURE — 80053 COMPREHEN METABOLIC PANEL: CPT

## 2019-10-05 PROCEDURE — 36415 COLL VENOUS BLD VENIPUNCTURE: CPT | Mod: PO

## 2019-10-06 LAB — TACROLIMUS BLD-MCNC: 6.4 NG/ML (ref 5–15)

## 2019-10-07 ENCOUNTER — OFFICE VISIT (OUTPATIENT)
Dept: TRANSPLANT | Facility: CLINIC | Age: 49
End: 2019-10-07
Attending: INTERNAL MEDICINE
Payer: MEDICARE

## 2019-10-07 ENCOUNTER — PROCEDURE VISIT (OUTPATIENT)
Dept: HEPATOLOGY | Facility: CLINIC | Age: 49
End: 2019-10-07
Attending: INTERNAL MEDICINE
Payer: MEDICARE

## 2019-10-07 VITALS
HEART RATE: 84 BPM | DIASTOLIC BLOOD PRESSURE: 91 MMHG | WEIGHT: 144.81 LBS | BODY MASS INDEX: 28.43 KG/M2 | SYSTOLIC BLOOD PRESSURE: 152 MMHG | RESPIRATION RATE: 16 BRPM | OXYGEN SATURATION: 98 % | TEMPERATURE: 99 F | HEIGHT: 60 IN

## 2019-10-07 DIAGNOSIS — T86.41 CHRONIC REJECTION OF LIVER TRANSPLANT: ICD-10-CM

## 2019-10-07 DIAGNOSIS — T86.41 CHRONIC REJECTION OF LIVER TRANSPLANT: Primary | ICD-10-CM

## 2019-10-07 DIAGNOSIS — Z94.4 S/P LIVER TRANSPLANT: Chronic | ICD-10-CM

## 2019-10-07 DIAGNOSIS — K83.1 BILE DUCT STRICTURE: ICD-10-CM

## 2019-10-07 DIAGNOSIS — Z29.89 PROPHYLACTIC IMMUNOTHERAPY: Chronic | ICD-10-CM

## 2019-10-07 PROCEDURE — 99999 PR PBB SHADOW E&M-EST. PATIENT-LVL V: CPT | Mod: PBBFAC,,, | Performed by: INTERNAL MEDICINE

## 2019-10-07 PROCEDURE — 3080F DIAST BP >= 90 MM HG: CPT | Mod: S$GLB,,, | Performed by: INTERNAL MEDICINE

## 2019-10-07 PROCEDURE — 99215 OFFICE O/P EST HI 40 MIN: CPT | Mod: S$GLB,,, | Performed by: INTERNAL MEDICINE

## 2019-10-07 PROCEDURE — 3080F PR MOST RECENT DIASTOLIC BLOOD PRESSURE >= 90 MM HG: ICD-10-PCS | Mod: S$GLB,,, | Performed by: INTERNAL MEDICINE

## 2019-10-07 PROCEDURE — 91200 PR LIVER ELASTOGRAPHY W/OUT IMAG W/INTERP & REPORT: ICD-10-PCS | Mod: S$GLB,,, | Performed by: INTERNAL MEDICINE

## 2019-10-07 PROCEDURE — 3077F PR MOST RECENT SYSTOLIC BLOOD PRESSURE >= 140 MM HG: ICD-10-PCS | Mod: S$GLB,,, | Performed by: INTERNAL MEDICINE

## 2019-10-07 PROCEDURE — 99999 PR PBB SHADOW E&M-EST. PATIENT-LVL V: ICD-10-PCS | Mod: PBBFAC,,, | Performed by: INTERNAL MEDICINE

## 2019-10-07 PROCEDURE — 91200 LIVER ELASTOGRAPHY: CPT | Mod: S$GLB,,, | Performed by: INTERNAL MEDICINE

## 2019-10-07 PROCEDURE — 3077F SYST BP >= 140 MM HG: CPT | Mod: S$GLB,,, | Performed by: INTERNAL MEDICINE

## 2019-10-07 PROCEDURE — 99215 PR OFFICE/OUTPT VISIT, EST, LEVL V, 40-54 MIN: ICD-10-PCS | Mod: S$GLB,,, | Performed by: INTERNAL MEDICINE

## 2019-10-07 PROCEDURE — 3008F BODY MASS INDEX DOCD: CPT | Mod: S$GLB,,, | Performed by: INTERNAL MEDICINE

## 2019-10-07 PROCEDURE — 3008F PR BODY MASS INDEX (BMI) DOCUMENTED: ICD-10-PCS | Mod: S$GLB,,, | Performed by: INTERNAL MEDICINE

## 2019-10-07 NOTE — PROCEDURES
Procedures   Fibroscan Procedure     Name: Elda CHAVEZ Mary  Date of Procedure : 10/07/2019   :: Clark Miguel MD  Diagnosis: Transplant    Probe: M    Fibroscan readin.3 KPa    Fibrosis:F4     CAP readin dB/m    Steatosis: :<S1

## 2019-10-07 NOTE — PROGRESS NOTES
Subjective:       Patient ID: Elda Hernandez is a 49 y.o. female.    Chief Complaint: Liver Transplant Follow-up    HPI  I saw this 49 y.o. lady in the liver Tx clinic. She is Dr Nielsen's patient.    OLT in 2002 for Glycogen Storage Disease Type I (GSD-I)  She is now 17 years post Tx.     Elda Doran is here for follow up of her liver transplant.     ORGAN: LIVER  Whole or Partial: whole liver  Donor Type:   CDC High Risk:   Donor CMV Status: Positive  Donor HCV Status: Negative  Donor HBcAb: Negative  Donor HBV ROSS:   Donor HCV ROSS:   Biliary Anastomosis:   Arterial Anatomy:   IVC reconstruction:   Portal vein status:      SEROLOGIES R/D: CMV -/+ HCV -/- HBcAb -/-      She has had the following complications since transplant: chronic rejection, biliary stricture and ductopenic rejection. The noted complications are well controlled.     Liver biopsy in 2015 with features of chronic and acute rejection. Treated with steroid pulse and taper. Last ERCP in 2015 with sphincterotomy; biliary stent removed.     Abdo US: 9/24/2019  1. There is a stable 1.2 cm hypoechoic solid mass with adjacent 3 mm calcification exophytic from the lateral midpole of the right kidney.  There is no hydronephrosis.  2. Status post hepatic transplantation.  There is no solid hepatic mass.  There is a 2 mm calcification in the right lobe of the liver.  There is a heterogeneous coarse echotexture.  Transplant hepatic vascularity shows normal directional flow and waveforms.  3. The pancreas is normal.  4. There is no biliary ductal dilatation.    Has had 2 opinions regarding her kidney lesion  - cryotherapy vs surgery      Review of Systems   Constitutional: Negative for activity change, appetite change, chills, fatigue, fever and unexpected weight change.   HENT: Negative for ear pain, hearing loss, nosebleeds, sore throat and trouble swallowing.    Eyes: Negative for redness and visual disturbance.   Respiratory: Negative for cough, chest  tightness, shortness of breath and wheezing.    Cardiovascular: Negative for chest pain and palpitations.   Gastrointestinal: Negative for abdominal distention, abdominal pain, blood in stool, constipation, diarrhea, nausea and vomiting.   Genitourinary: Negative for difficulty urinating, dysuria, frequency, hematuria and urgency.   Musculoskeletal: Negative for arthralgias, back pain, gait problem, joint swelling and myalgias.   Skin: Negative for rash.   Neurological: Negative for tremors, seizures, speech difficulty, weakness and headaches.   Hematological: Negative for adenopathy.   Psychiatric/Behavioral: Negative for confusion, decreased concentration and sleep disturbance. The patient is not nervous/anxious.             Lab Results   Component Value Date    ALT 70 (H) 10/05/2019    AST 79 (H) 10/05/2019     (H) 04/07/2018    ALKPHOS 306 (H) 10/05/2019    BILITOT 0.8 10/05/2019     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    Arnold-Chiari malformation     Depression     Esophageal stricture     Essential tremor     Hypertension     Left bundle branch block     Liver fibrosis, transplanted liver 10/2/2018    Suggested on fibroscan 10/2/18    Migraine without aura     MVP (mitral valve prolapse)     Non-rheumatic mitral regurgitation 10/1/2018    Non-rheumatic tricuspid valve insufficiency 10/1/2018    Osteoporosis     Recurrent urinary tract infection     Seizures     Shingles 2007    SIADH (syndrome of inappropriate ADH production)     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    Tricuspid valve prolapse     Urolithiasis     Von Gierke disease     s/p liver transplant     Past Surgical History:   Procedure Laterality Date    APPENDECTOMY  6/22/2007    COLONOSCOPY  5/13/2008    internal hemorrhoids    CRANIOTOMY      LAMINECTOMY  3/2001    LIVER TRANSPLANT  9/23/2002    OSSICULAR RECONSTRUCTION  10/4/1995    RIGHT REPLACEMENT PROSTHESIS for  cholesteatoma    THYMECTOMY  5/2/2007    TONSILLECTOMY, ADENOIDECTOMY  1/21/2004    TOTAL ABDOMINAL HYSTERECTOMY  3/31/1994     Current Outpatient Medications   Medication Sig    acyclovir (ZOVIRAX) 400 MG tablet TK 1 T PO FID FOR 5 DAYS    amitriptyline (ELAVIL) 75 MG tablet TK 1 T PO  QD    calcium carbonate (OS-JASON) 600 mg (1,500 mg) Tab Take 600 mg by mouth 2 (two) times daily with meals.    clonazePAM (KLONOPIN) 0.5 MG tablet Take 0.5 mg by mouth 2 (two) times daily as needed for Anxiety.    demeclocycline (DECLOMYCIN) 150 MG Tab Take 150 mg by mouth every 12 (twelve) hours.    DENAVIR 1 % cream RICHAR EXT AA Q 2 H FOR 4 DAYS    ergocalciferol (ERGOCALCIFEROL) 50,000 unit Cap Take 50,000 Units by mouth every 7 days.    gabapentin (NEURONTIN) 300 MG capsule Take 300 mg by mouth 3 (three) times daily.    hydrOXYzine HCl (ATARAX) 25 MG tablet Take 1 tablet (25 mg total) by mouth every 8 (eight) hours as needed for Itching.    levothyroxine (SYNTHROID) 75 MCG tablet Take 75 mcg by mouth once daily.    lisinopril (PRINIVIL,ZESTRIL) 40 MG tablet TK 1 T PO QD    magnesium oxide (MAG-OX) 400 mg (241.3 mg magnesium) tablet TK 2 TS PO BID    multivitamin capsule Take 1 capsule by mouth once daily.    mycophenolate (CELLCEPT) 250 mg Cap Take 2 capsules (500 mg total) by mouth 2 (two) times daily.    ondansetron (ZOFRAN) 8 MG tablet TAKE 1 TABLET(8 MG) BY MOUTH EVERY 8 HOURS AS NEEDED FOR NAUSEA    pantoprazole (PROTONIX) 40 MG tablet Take 1 tablet (40 mg total) by mouth 2 (two) times daily.    potassium chloride (KLOR-CON) 10 MEQ TbSR TAKE 2 TABLETS(20 MEQ) BY MOUTH EVERY DAY    promethazine (PHENERGAN) 25 MG tablet Take 25 mg by mouth every 4 (four) hours as needed (if unrelieved by zofran).     saliva stimulant agents comb.3 (BIOTENE MOISTURIZING MOUTH) Spry by Mucous Membrane route daily as needed.    tacrolimus (PROGRAF) 1 MG Cap Take 2 capsules (2 mg total) by mouth every morning AND 1 capsule (1  mg total) every evening.    triamcinolone acetonide 0.1% (KENALOG) 0.1 % cream Apply topically 2 (two) times daily.    venlafaxine (EFFEXOR-XR) 150 MG Cp24 TK 1 C PO QD WF    butalbital-acetaminophen  mg Tab TK 1 T PO Q 4 H PRN. NTE 6 H    lactulose (CHRONULAC) 10 gram/15 mL solution TAKE 20 ML BY MOUTH TWICE DAILY (Patient not taking: Reported on 10/7/2019)     No current facility-administered medications for this visit.        Objective:      Physical Exam   Constitutional: She appears well-nourished. No distress.   HENT:   Head: Normocephalic.   Eyes: Pupils are equal, round, and reactive to light.   Neck: No JVD present. No tracheal deviation present. No thyromegaly present.   Cardiovascular: Normal rate, regular rhythm and normal heart sounds.   No murmur heard.  Pulmonary/Chest: Effort normal and breath sounds normal. No stridor.   Abdominal: Soft.   Lymphadenopathy:        Head (right side): No submental, no submandibular, no tonsillar, no preauricular, no posterior auricular and no occipital adenopathy present.        Head (left side): No submental, no submandibular, no tonsillar, no preauricular, no posterior auricular and no occipital adenopathy present.     She has no cervical adenopathy.     She has no axillary adenopathy.   Neurological: She is alert. She has normal strength. She is not disoriented. No cranial nerve deficit or sensory deficit.   Skin: Skin is intact. No cyanosis.         Assessment:       1. Chronic rejection of liver transplant    2. 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    3. Bile duct stricture    4. Prophylactic immunotherapy        Plan:   Overall she is well but she has 2 outstanding issues:    1) Kidney lesion for which she would like a third opinion re surgery vs cryotherapy  2) Cirrhosis on fibroscan today-  in hospital a couple of years ago with HE    We will ask one of our urologists to see her and we will also start HCC screening given that she is now  cirrhotic.    Clinic in 1 year.    UNOS Patient Status  Functional Status: 100% - Normal, no complaints, no evidence of disease  Physical Capacity: No Limitations

## 2019-10-07 NOTE — LETTER
October 13, 2019        Jordana Woods  [address] [facility] [address]  Phone: [phone]  Fax: [phone]             [facility] Liver Transplant  [address]  [facility] [address]  Phone: [phone]   Patient: Elda Hernandez   MR Number: [id number]   YOB: 1970   Date of Visit: 10/7/2019       Dear Dr. Jordana Woods    Thank you for referring Elda Hernandez to me for evaluation. Attached you will find relevant portions of my assessment and plan of care.    If you have questions, please do not hesitate to call me. I look forward to following Elda Hernandez along with you.    Sincerely,    Clark Miguel MD    Enclosure    If you would like to receive this communication electronically, please contact [email or web address] or [phone] to request [facility] [employer] access.    [employer] is a tool which provides read-only access to select patient information with whom you have a relationship. Its easy to use and provides real time access to review your patients record including encounter summaries, notes, results, and demographic information.    If you feel you have received this communication in error or would no longer like to receive these types of communications, please e-mail [email or web address]

## 2019-10-08 ENCOUNTER — TELEPHONE (OUTPATIENT)
Dept: TRANSPLANT | Facility: CLINIC | Age: 49
End: 2019-10-08

## 2019-10-08 NOTE — TELEPHONE ENCOUNTER
----- Message from Jaya Nielsen MD sent at 10/7/2019  6:23 PM CDT -----  Results reviewed. Please advise labs are stable.

## 2019-10-08 NOTE — TELEPHONE ENCOUNTER
Labs reviewed by Dr. Nielsen  Continue routine labs no changes needed.  Letter sent for next lab appointment 01/04/2020

## 2019-10-15 DIAGNOSIS — N28.89 RENAL MASS: Primary | ICD-10-CM

## 2019-11-15 DIAGNOSIS — Z94.4 LIVER REPLACED BY TRANSPLANT: ICD-10-CM

## 2019-11-18 RX ORDER — MYCOPHENOLATE MOFETIL 250 MG/1
CAPSULE ORAL
Qty: 120 CAPSULE | Refills: 0 | Status: SHIPPED | OUTPATIENT
Start: 2019-11-18 | End: 2020-03-23

## 2020-01-11 ENCOUNTER — LAB VISIT (OUTPATIENT)
Dept: LAB | Facility: HOSPITAL | Age: 50
End: 2020-01-11
Attending: INTERNAL MEDICINE
Payer: MEDICARE

## 2020-01-11 DIAGNOSIS — N83.202 BILATERAL OVARIAN CYSTS: ICD-10-CM

## 2020-01-11 DIAGNOSIS — R87.622 PAPANICOLAOU SMEAR OF VAGINA WITH LOW GRADE SQUAMOUS INTRAEPITHELIAL LESION (LGSIL): ICD-10-CM

## 2020-01-11 DIAGNOSIS — Z94.4 LIVER REPLACED BY TRANSPLANT: ICD-10-CM

## 2020-01-11 DIAGNOSIS — N83.201 BILATERAL OVARIAN CYSTS: ICD-10-CM

## 2020-01-11 DIAGNOSIS — L29.9 PRURITUS: ICD-10-CM

## 2020-01-11 LAB
AFP SERPL-MCNC: 2.8 NG/ML (ref 0–8.4)
ALBUMIN SERPL BCP-MCNC: 3.9 G/DL (ref 3.5–5.2)
ALP SERPL-CCNC: 286 U/L (ref 55–135)
ALT SERPL W/O P-5'-P-CCNC: 51 U/L (ref 10–44)
ANION GAP SERPL CALC-SCNC: 14 MMOL/L (ref 8–16)
AST SERPL-CCNC: 55 U/L (ref 10–40)
BASOPHILS # BLD AUTO: 0.07 K/UL (ref 0–0.2)
BASOPHILS NFR BLD: 1.1 % (ref 0–1.9)
BILIRUB SERPL-MCNC: 0.7 MG/DL (ref 0.1–1)
BUN SERPL-MCNC: 37 MG/DL (ref 6–20)
CALCIUM SERPL-MCNC: 10 MG/DL (ref 8.7–10.5)
CHLORIDE SERPL-SCNC: 103 MMOL/L (ref 95–110)
CO2 SERPL-SCNC: 20 MMOL/L (ref 23–29)
CREAT SERPL-MCNC: 2.2 MG/DL (ref 0.5–1.4)
DIFFERENTIAL METHOD: ABNORMAL
EOSINOPHIL # BLD AUTO: 0.2 K/UL (ref 0–0.5)
EOSINOPHIL NFR BLD: 3.2 % (ref 0–8)
ERYTHROCYTE [DISTWIDTH] IN BLOOD BY AUTOMATED COUNT: 14.8 % (ref 11.5–14.5)
EST. GFR  (AFRICAN AMERICAN): 29.1 ML/MIN/1.73 M^2
EST. GFR  (NON AFRICAN AMERICAN): 25.2 ML/MIN/1.73 M^2
GLUCOSE SERPL-MCNC: 70 MG/DL (ref 70–110)
HCT VFR BLD AUTO: 37.2 % (ref 37–48.5)
HGB BLD-MCNC: 12.1 G/DL (ref 12–16)
IMM GRANULOCYTES # BLD AUTO: 0.02 K/UL (ref 0–0.04)
IMM GRANULOCYTES NFR BLD AUTO: 0.3 % (ref 0–0.5)
INR PPP: 1.1 (ref 0.8–1.2)
LYMPHOCYTES # BLD AUTO: 1.6 K/UL (ref 1–4.8)
LYMPHOCYTES NFR BLD: 23.6 % (ref 18–48)
MCH RBC QN AUTO: 27.6 PG (ref 27–31)
MCHC RBC AUTO-ENTMCNC: 32.5 G/DL (ref 32–36)
MCV RBC AUTO: 85 FL (ref 82–98)
MONOCYTES # BLD AUTO: 0.8 K/UL (ref 0.3–1)
MONOCYTES NFR BLD: 12.2 % (ref 4–15)
NEUTROPHILS # BLD AUTO: 4 K/UL (ref 1.8–7.7)
NEUTROPHILS NFR BLD: 59.6 % (ref 38–73)
NRBC BLD-RTO: 0 /100 WBC
PLATELET # BLD AUTO: 147 K/UL (ref 150–350)
PMV BLD AUTO: 13 FL (ref 9.2–12.9)
POTASSIUM SERPL-SCNC: 4.3 MMOL/L (ref 3.5–5.1)
PROT SERPL-MCNC: 7 G/DL (ref 6–8.4)
PROTHROMBIN TIME: 11.3 SEC (ref 9–12.5)
RBC # BLD AUTO: 4.39 M/UL (ref 4–5.4)
SODIUM SERPL-SCNC: 137 MMOL/L (ref 136–145)
WBC # BLD AUTO: 6.66 K/UL (ref 3.9–12.7)

## 2020-01-11 PROCEDURE — 85610 PROTHROMBIN TIME: CPT | Mod: PO

## 2020-01-11 PROCEDURE — 36415 COLL VENOUS BLD VENIPUNCTURE: CPT | Mod: PO

## 2020-01-11 PROCEDURE — 85025 COMPLETE CBC W/AUTO DIFF WBC: CPT

## 2020-01-11 PROCEDURE — 80053 COMPREHEN METABOLIC PANEL: CPT

## 2020-01-11 PROCEDURE — 82105 ALPHA-FETOPROTEIN SERUM: CPT

## 2020-01-11 PROCEDURE — 80197 ASSAY OF TACROLIMUS: CPT

## 2020-01-12 LAB — TACROLIMUS BLD-MCNC: 5.3 NG/ML (ref 5–15)

## 2020-01-13 ENCOUNTER — TELEPHONE (OUTPATIENT)
Dept: TRANSPLANT | Facility: CLINIC | Age: 50
End: 2020-01-13

## 2020-01-13 DIAGNOSIS — Z94.4 LIVER REPLACED BY TRANSPLANT: Primary | ICD-10-CM

## 2020-01-13 DIAGNOSIS — C22.0 HEPATOCELLULAR CARCINOMA: ICD-10-CM

## 2020-01-13 NOTE — TELEPHONE ENCOUNTER
----- Message from Jaya Nielsen MD sent at 1/13/2020  8:05 AM CST -----  Results reviewed. Please advise labs are stable.

## 2020-01-13 NOTE — TELEPHONE ENCOUNTER
Labs reviewed by Dr. Nielsen  Continue routine labs no changes needed.  Letter sent for next lab appointment 04/11/20

## 2020-01-17 ENCOUNTER — OFFICE VISIT (OUTPATIENT)
Dept: OPTOMETRY | Facility: CLINIC | Age: 50
End: 2020-01-17
Payer: COMMERCIAL

## 2020-01-17 DIAGNOSIS — Z98.890 HX OF LASIK: ICD-10-CM

## 2020-01-17 DIAGNOSIS — H04.123 DRY EYES, BILATERAL: ICD-10-CM

## 2020-01-17 DIAGNOSIS — H52.4 PRESBYOPIA: Primary | ICD-10-CM

## 2020-01-17 PROCEDURE — 99999 PR PBB SHADOW E&M-EST. PATIENT-LVL III: ICD-10-PCS | Mod: PBBFAC,,, | Performed by: OPTOMETRIST

## 2020-01-17 PROCEDURE — 92004 COMPRE OPH EXAM NEW PT 1/>: CPT | Mod: S$GLB,,, | Performed by: OPTOMETRIST

## 2020-01-17 PROCEDURE — 92015 PR REFRACTION: ICD-10-PCS | Mod: S$GLB,,, | Performed by: OPTOMETRIST

## 2020-01-17 PROCEDURE — 92015 DETERMINE REFRACTIVE STATE: CPT | Mod: S$GLB,,, | Performed by: OPTOMETRIST

## 2020-01-17 PROCEDURE — 92004 PR EYE EXAM, NEW PATIENT,COMPREHESV: ICD-10-PCS | Mod: S$GLB,,, | Performed by: OPTOMETRIST

## 2020-01-17 PROCEDURE — 99999 PR PBB SHADOW E&M-EST. PATIENT-LVL III: CPT | Mod: PBBFAC,,, | Performed by: OPTOMETRIST

## 2020-01-21 ENCOUNTER — OFFICE VISIT (OUTPATIENT)
Dept: PHYSICAL MEDICINE AND REHAB | Facility: CLINIC | Age: 50
End: 2020-01-21
Payer: MEDICARE

## 2020-01-21 VITALS — WEIGHT: 144 LBS | BODY MASS INDEX: 29.03 KG/M2 | HEIGHT: 59 IN

## 2020-01-21 DIAGNOSIS — G56.03 BILATERAL CARPAL TUNNEL SYNDROME: Primary | ICD-10-CM

## 2020-01-21 PROCEDURE — 99999 PR PBB SHADOW E&M-EST. PATIENT-LVL II: ICD-10-PCS | Mod: PBBFAC,,, | Performed by: PHYSICAL MEDICINE & REHABILITATION

## 2020-01-21 PROCEDURE — 20526 THER INJECTION CARP TUNNEL: CPT | Mod: 50,S$GLB,, | Performed by: PHYSICAL MEDICINE & REHABILITATION

## 2020-01-21 PROCEDURE — 76942 CARPAL TUNNEL: R INTERCARPAL, L INTERCARPAL: ICD-10-PCS | Mod: S$GLB,,, | Performed by: PHYSICAL MEDICINE & REHABILITATION

## 2020-01-21 PROCEDURE — 20526 PR INJECT CARPAL TUNNEL: ICD-10-PCS | Mod: 50,S$GLB,, | Performed by: PHYSICAL MEDICINE & REHABILITATION

## 2020-01-21 PROCEDURE — 99999 PR PBB SHADOW E&M-EST. PATIENT-LVL II: CPT | Mod: PBBFAC,,, | Performed by: PHYSICAL MEDICINE & REHABILITATION

## 2020-01-21 PROCEDURE — 76942 ECHO GUIDE FOR BIOPSY: CPT | Mod: S$GLB,,, | Performed by: PHYSICAL MEDICINE & REHABILITATION

## 2020-01-21 RX ORDER — BETAMETHASONE SODIUM PHOSPHATE AND BETAMETHASONE ACETATE 3; 3 MG/ML; MG/ML
6 INJECTION, SUSPENSION INTRA-ARTICULAR; INTRALESIONAL; INTRAMUSCULAR; SOFT TISSUE
Status: DISCONTINUED | OUTPATIENT
Start: 2020-01-21 | End: 2020-01-21 | Stop reason: HOSPADM

## 2020-01-21 RX ADMIN — BETAMETHASONE SODIUM PHOSPHATE AND BETAMETHASONE ACETATE 6 MG: 3; 3 INJECTION, SUSPENSION INTRA-ARTICULAR; INTRALESIONAL; INTRAMUSCULAR; SOFT TISSUE at 01:01

## 2020-01-21 NOTE — PROCEDURES
Carpal Tunnel: R intercarpal, L intercarpal  Date/Time: 1/21/2020 1:00 PM  Performed by: Mauricio Lindo MD  Authorized by: Mauricio Lindo MD     Consent Done?:  Yes (Verbal)  Indications:  Pain  Site marked: The procedure site was marked    Timeout: Prior to procedure the correct patient, procedure, and site was verified      Location:  Wrist  Site:  R intercarpal and L intercarpal  Prep: Patient was prepped and draped in usual sterile fashion    Ultrasonic Guidance for needle placement: Yes  Images are saved and documented.  Needle gauge: 27G.  Approach:  Volar  Medications:  6 mg betamethasone acetate-betamethasone sodium phosphate 6 mg/mL; 6 mg betamethasone acetate-betamethasone sodium phosphate 6 mg/mL  Lab: Fluid sent for laboratory analysis    Patient tolerance:  Patient tolerated the procedure well with no immediate complications     Additional Comments: Ultrasound guidance was used for correct needle placement, the images were saved will be uploaded to EMR.

## 2020-01-21 NOTE — PROGRESS NOTES
[facility]    CHIEF COMPLAINT:   Chief Complaint   Patient presents with    Follow-up     wrist     HISTORY OF PRESENT ILLNESS: Elda Hernandez is a 49 y.o. female who presents to me as f/u for carpal tunnel syndrome. Her LCV was on 5/21/19 at which point we gavce her BL carpal tunnel injections. She recalls almost complete relief for 3-4 weeks with gradual re-onset. She also recalls the injections taking approx 2 weeks for its full effects. The pain has returned in a similar character and distribution with worsening intensity compared to before. She continues to deny neck pain. She is dropping objects more frequently. The numbness is felt in both hands and encompasses the entire hand. She continues to wear night splints.     Review of Systems   Constitutional: Negative for fever.   HENT: Negative for drooling.    Eyes: Negative for discharge.   Respiratory: Negative for choking.    Cardiovascular: Negative for chest pain.   Genitourinary: Negative for flank pain.   Skin: Negative for wound.   Allergic/Immunologic: Negative for immunocompromised state.   Neurological: Negative for tremors and syncope.   Psychiatric/Behavioral: Negative for behavioral problems.     Past Medical History:   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    Arnold-Chiari malformation     Depression     Esophageal stricture     Essential tremor     Hypertension     Left bundle branch block     Liver fibrosis, transplanted liver 10/2/2018    Suggested on fibroscan 10/2/18    Migraine without aura     MVP (mitral valve prolapse)     Non-rheumatic mitral regurgitation 10/1/2018    Non-rheumatic tricuspid valve insufficiency 10/1/2018    Osteoporosis     Recurrent urinary tract infection     Seizures     Shingles 2007    SIADH (syndrome of inappropriate ADH production)     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    Tricuspid valve prolapse     Urolithiasis     Von  Gierke disease     s/p liver transplant       Past Surgical History:   Past Surgical History:   Procedure Laterality Date    APPENDECTOMY  6/22/2007    COLONOSCOPY  5/13/2008    internal hemorrhoids    CRANIOTOMY      LAMINECTOMY  3/2001    LIVER TRANSPLANT  9/23/2002    OSSICULAR RECONSTRUCTION  10/4/1995    RIGHT REPLACEMENT PROSTHESIS for cholesteatoma    THYMECTOMY  5/2/2007    TONSILLECTOMY, ADENOIDECTOMY  1/21/2004    TOTAL ABDOMINAL HYSTERECTOMY  3/31/1994       Family History: History reviewed. No pertinent family history.    Medications:   Current Outpatient Medications on File Prior to Visit   Medication Sig Dispense Refill    acyclovir (ZOVIRAX) 400 MG tablet TK 1 T PO FID FOR 5 DAYS  3    amitriptyline (ELAVIL) 75 MG tablet TK 1 T PO  QD  0    butalbital-acetaminophen  mg Tab TK 1 T PO Q 4 H PRN. NTE 6 H  4    calcium carbonate (OS-JASON) 600 mg (1,500 mg) Tab Take 600 mg by mouth 2 (two) times daily with meals.      clonazePAM (KLONOPIN) 0.5 MG tablet Take 0.5 mg by mouth 2 (two) times daily as needed for Anxiety.      demeclocycline (DECLOMYCIN) 150 MG Tab Take 150 mg by mouth every 12 (twelve) hours.      DENAVIR 1 % cream RICHAR EXT AA Q 2 H FOR 4 DAYS  0    ergocalciferol (ERGOCALCIFEROL) 50,000 unit Cap Take 50,000 Units by mouth every 7 days.      gabapentin (NEURONTIN) 300 MG capsule Take 300 mg by mouth 3 (three) times daily.      hydrOXYzine HCl (ATARAX) 25 MG tablet Take 1 tablet (25 mg total) by mouth every 8 (eight) hours as needed for Itching. 90 tablet 1    lactulose (CHRONULAC) 10 gram/15 mL solution TAKE 20 ML BY MOUTH TWICE DAILY 3645 mL 0    levothyroxine (SYNTHROID) 75 MCG tablet Take 75 mcg by mouth once daily.      lifitegrast (XIIDRA) 5 % Dpet Place 1 drop into both eyes 2 (two) times daily. 60 each 11    lisinopril (PRINIVIL,ZESTRIL) 40 MG tablet TK 1 T PO QD  5    magnesium oxide (MAG-OX) 400 mg (241.3 mg magnesium) tablet TK 2 TS PO BID  5     multivitamin capsule Take 1 capsule by mouth once daily.      mycophenolate (CELLCEPT) 250 mg Cap TAKE 2 CAPSULES(500 MG) BY MOUTH TWICE DAILY 120 capsule 0    ondansetron (ZOFRAN) 8 MG tablet TAKE 1 TABLET(8 MG) BY MOUTH EVERY 8 HOURS AS NEEDED FOR NAUSEA 30 tablet 0    pantoprazole (PROTONIX) 40 MG tablet Take 1 tablet (40 mg total) by mouth 2 (two) times daily. 60 tablet 2    potassium chloride (KLOR-CON) 10 MEQ TbSR TAKE 2 TABLETS(20 MEQ) BY MOUTH EVERY DAY 60 tablet 0    promethazine (PHENERGAN) 25 MG tablet Take 25 mg by mouth every 4 (four) hours as needed (if unrelieved by zofran).   5    saliva stimulant agents comb.3 (BIOTENE MOISTURIZING MOUTH) Spry by Mucous Membrane route daily as needed.      tacrolimus (PROGRAF) 1 MG Cap Take 2 capsules (2 mg total) by mouth every morning AND 1 capsule (1 mg total) every evening. 90 capsule 11    triamcinolone acetonide 0.1% (KENALOG) 0.1 % cream Apply topically 2 (two) times daily. 454 g 1    venlafaxine (EFFEXOR-XR) 150 MG Cp24 TK 1 C PO QD WF  1     No current facility-administered medications on file prior to visit.        Allergies:   Review of patient's allergies indicates:   Allergen Reactions    Codeine Itching     Other reaction(s): Itching    Lipitor [atorvastatin] Other (See Comments)     Other reaction(s): Muscle pain  Muscle cranmps    Morphine Itching     Other reaction(s): nausea and vomiting     Zoloft [sertraline] Other (See Comments)     Tremors/muscle spasms       Social History:   Social History     Socioeconomic History    Marital status:      Spouse name: Not on file    Number of children: Not on file    Years of education: Not on file    Highest education level: Not on file   Occupational History    Not on file   Social Needs    Financial resource strain: Not on file    Food insecurity:     Worry: Not on file     Inability: Not on file    Transportation needs:     Medical: Not on file     Non-medical: Not on file  "  Tobacco Use    Smoking status: Never Smoker   Substance and Sexual Activity    Alcohol use: No    Drug use: No    Sexual activity: Not on file   Lifestyle    Physical activity:     Days per week: Not on file     Minutes per session: Not on file    Stress: Not on file   Relationships    Social connections:     Talks on phone: Not on file     Gets together: Not on file     Attends Jain service: Not on file     Active member of club or organization: Not on file     Attends meetings of clubs or organizations: Not on file     Relationship status: Not on file   Other Topics Concern    Not on file   Social History Narrative    Not on file     Elda Doran is disabled due to liver failure.    PHYSICAL EXAMINATION:   General    Vitals:    01/21/20 1307   Weight: 65.3 kg (144 lb)   Height: 4' 11" (1.499 m)     Constitutional: Oriented to person, place, and time. No apparent distress. Pleasant.  HENT:   Head: Normocephalic and atraumatic.   Eyes: Right eye exhibits no discharge. Left eye exhibits no discharge. No scleral icterus.   Pulmonary/Chest: Effort normal. No respiratory distress.   Abdominal: There is no guarding.   Neurological: Alert and oriented to person, place, and time.   Psychiatric: Behavior is normal.   Right Hand Exam     Tenderness   The patient is experiencing tenderness in the [place] area.    Range of Motion   Wrist   Extension: 45   Flexion: 90   Pronation: normal   Supination: normal     Muscle Strength   Wrist extension: 5/5   Wrist flexion: 5/5   : 5/5     Tests   Phalens sign: positive  Tinel's sign (median nerve): positive    Other   Erythema: absent  Scars: absent  Sensation: normal  Pulse: present    Comments:  2+ reflexes in biceps, triceps and brachioradialis  Durkins test pos      Left Hand Exam     Tenderness   The patient is experiencing tenderness in the [place] area.     Range of Motion   Wrist   Extension: 45   Flexion: 90   Pronation: normal   Supination: normal     Muscle " Strength   Wrist extension: 5/5   Wrist flexion: 5/5   :  5/5     Tests   Phalens Sign: negative  Tinel's sign (median nerve): negative    Other   Erythema: absent  Scars: absent  Sensation: normal  Pulse: present    Comments:  2+ reflexes in biceps, triceps and brachioradialis  Durkins test neg        INSPECTION: There is no swelling, ecchymoses, erythema or gross deformity about the bilateral hands. No thenar atrophy.    Diagnostic ultrasound exam:  U/S reviewed from last visit of the bilateral median nerves.  The images were saved will be uploaded to EMR.  On the right, the median nerve was observed in its short axis at the level of the forearm.  The nerve was tracked distally into the hand.  There were no observed compressive lesions or masses.  The cross-sectional area of the median nerve at the level of the carpal tunnel inlet measured 0.12 centimeters squared.  There was no observed notching when observing the nerve in long axis at the level of the transverse carpal ligament.    On the left, the median nerve was observed in its short axis at the level of the forearm.  The nerve was tracked distally into the hand.  There were no observed compressive lesions or masses.  The cross-sectional area of the median nerve at the level of the carpal tunnel inlet also measured 0.12 centimeters squared.  There was no observed notching when observing the nerve in long axis at the level of the transverse carpal ligament.    ASSESSMENT:   1. Bilateral carpal tunnel syndrome      PLAN:     1. Time was spent reviewing the above diagnosis in depth with Elda Doran today, including acute management and rehabilitation.     2.  Her signs, symptoms, and diagnostic ultrasound exam findings continue to be consistent with a diagnosis of bilateral carpal tunnel syndrome.  I recommended conservative treatment in the form of night splints and injection therapy.  She already has night splints that we encouraged her to continue to use.  She wished to move forward with repeat injections today.  Bilateral carpal tunnel injections today with corticosteroid and median nerve hydrodissection under ultrasound guidance. See procedure note.    3. Continue CTS splints.     4. If no relief with above, I encouraged her to reach out to us to schedule BUE NCS/EMG and discuss surgical referral.     The above note was completed, in part, with the aid of Dragon dictation software/hardware. Translation errors may be present.

## 2020-02-03 ENCOUNTER — TELEPHONE (OUTPATIENT)
Dept: OPTOMETRY | Facility: CLINIC | Age: 50
End: 2020-02-03

## 2020-03-03 ENCOUNTER — LAB VISIT (OUTPATIENT)
Dept: LAB | Facility: HOSPITAL | Age: 50
End: 2020-03-03
Attending: UROLOGY
Payer: MEDICARE

## 2020-03-03 ENCOUNTER — OFFICE VISIT (OUTPATIENT)
Dept: UROLOGY | Facility: CLINIC | Age: 50
End: 2020-03-03
Payer: MEDICARE

## 2020-03-03 ENCOUNTER — OFFICE VISIT (OUTPATIENT)
Dept: OPTOMETRY | Facility: CLINIC | Age: 50
End: 2020-03-03
Payer: MEDICARE

## 2020-03-03 VITALS
HEIGHT: 59 IN | WEIGHT: 138.88 LBS | BODY MASS INDEX: 28 KG/M2 | SYSTOLIC BLOOD PRESSURE: 136 MMHG | HEART RATE: 79 BPM | RESPIRATION RATE: 15 BRPM | DIASTOLIC BLOOD PRESSURE: 89 MMHG

## 2020-03-03 DIAGNOSIS — N28.89 RIGHT RENAL MASS: ICD-10-CM

## 2020-03-03 DIAGNOSIS — H52.4 PRESBYOPIA: Primary | ICD-10-CM

## 2020-03-03 DIAGNOSIS — H04.123 DRY EYES, BILATERAL: ICD-10-CM

## 2020-03-03 DIAGNOSIS — N28.89 RIGHT RENAL MASS: Primary | ICD-10-CM

## 2020-03-03 LAB
ANION GAP SERPL CALC-SCNC: 8 MMOL/L (ref 8–16)
BUN SERPL-MCNC: 19 MG/DL (ref 6–20)
CALCIUM SERPL-MCNC: 9.5 MG/DL (ref 8.7–10.5)
CHLORIDE SERPL-SCNC: 105 MMOL/L (ref 95–110)
CO2 SERPL-SCNC: 27 MMOL/L (ref 23–29)
CREAT SERPL-MCNC: 0.9 MG/DL (ref 0.5–1.4)
EST. GFR  (AFRICAN AMERICAN): >60 ML/MIN/1.73 M^2
EST. GFR  (NON AFRICAN AMERICAN): >60 ML/MIN/1.73 M^2
GLUCOSE SERPL-MCNC: 87 MG/DL (ref 70–110)
POTASSIUM SERPL-SCNC: 4.3 MMOL/L (ref 3.5–5.1)
SODIUM SERPL-SCNC: 140 MMOL/L (ref 136–145)

## 2020-03-03 PROCEDURE — 99999 PR PBB SHADOW E&M-EST. PATIENT-LVL II: ICD-10-PCS | Mod: PBBFAC,,, | Performed by: OPTOMETRIST

## 2020-03-03 PROCEDURE — 99999 PR PBB SHADOW E&M-EST. PATIENT-LVL V: CPT | Mod: PBBFAC,,, | Performed by: UROLOGY

## 2020-03-03 PROCEDURE — 36415 COLL VENOUS BLD VENIPUNCTURE: CPT

## 2020-03-03 PROCEDURE — 3008F PR BODY MASS INDEX (BMI) DOCUMENTED: ICD-10-PCS | Mod: S$GLB,,, | Performed by: UROLOGY

## 2020-03-03 PROCEDURE — 80048 BASIC METABOLIC PNL TOTAL CA: CPT

## 2020-03-03 PROCEDURE — 99214 OFFICE O/P EST MOD 30 MIN: CPT | Mod: S$GLB,,, | Performed by: UROLOGY

## 2020-03-03 PROCEDURE — 3075F SYST BP GE 130 - 139MM HG: CPT | Mod: S$GLB,,, | Performed by: UROLOGY

## 2020-03-03 PROCEDURE — 3079F DIAST BP 80-89 MM HG: CPT | Mod: S$GLB,,, | Performed by: UROLOGY

## 2020-03-03 PROCEDURE — 99499 NO LOS: ICD-10-PCS | Mod: S$GLB,,, | Performed by: OPTOMETRIST

## 2020-03-03 PROCEDURE — 3008F BODY MASS INDEX DOCD: CPT | Mod: S$GLB,,, | Performed by: UROLOGY

## 2020-03-03 PROCEDURE — 99214 PR OFFICE/OUTPT VISIT, EST, LEVL IV, 30-39 MIN: ICD-10-PCS | Mod: S$GLB,,, | Performed by: UROLOGY

## 2020-03-03 PROCEDURE — 99999 PR PBB SHADOW E&M-EST. PATIENT-LVL II: CPT | Mod: PBBFAC,,, | Performed by: OPTOMETRIST

## 2020-03-03 PROCEDURE — 3075F PR MOST RECENT SYSTOLIC BLOOD PRESS GE 130-139MM HG: ICD-10-PCS | Mod: S$GLB,,, | Performed by: UROLOGY

## 2020-03-03 PROCEDURE — 99999 PR PBB SHADOW E&M-EST. PATIENT-LVL V: ICD-10-PCS | Mod: PBBFAC,,, | Performed by: UROLOGY

## 2020-03-03 PROCEDURE — 99499 UNLISTED E&M SERVICE: CPT | Mod: S$GLB,,, | Performed by: OPTOMETRIST

## 2020-03-03 PROCEDURE — 3079F PR MOST RECENT DIASTOLIC BLOOD PRESSURE 80-89 MM HG: ICD-10-PCS | Mod: S$GLB,,, | Performed by: UROLOGY

## 2020-03-03 NOTE — PROGRESS NOTES
HPI     DLS: 1/17/2020  Pt states her Rx glasses she is not able to see the street sign still   states also sometimes her dist part of her glasses make things blurry. Pt   glasses were made threw [facility].  Also states she is waiting on a PA for   her Xiidra.   occ floaters, no flashes   soooth XP gtts     Last edited by Lyla Browne MA on 3/3/2020  2:03 PM. (History)        ROS     Positive for: Eyes (LASIK)    Negative for: Constitutional, Gastrointestinal, Neurological, Skin,   Genitourinary, Musculoskeletal, HENT, Endocrine, Cardiovascular,   Respiratory, Psychiatric, Allergic/Imm, Heme/Lymph    Last edited by Carroll Tracy, ANA on 3/3/2020  2:43 PM. (History)        Assessment /Plan     For exam results, see Encounter Report.    Presbyopia    Dry eyes, bilateral      See notes from 1 month ago:  1. Sp LASIK OU--pt wearing readers, but now has distance issues.  Wrote bifocal Rx--discussed PALs/adaptation.  Also discussed CRIZAL for glare  2. Dry eye sx with limited relief on SOOTHE XP QID/sleep mask+gel at night.  Tried RESTASIS in past without relief.  Discussed options--pt wishes to try XIIDRA  3. Transplant pt    Pt presents TODAY with first time PAL wear--having difficulty adapting.  Refraction stable.  Power correct on spex, and OC aligned    PLAN:    1. Discussed adaptation w PALs.  If not adapted in 2 weeks of full time wear will return to [facility] for remake into distance only or lined bifocals  2. Awaiting PA form fo XIIDRA  3. NOLOS EPRx today

## 2020-03-03 NOTE — PROGRESS NOTES
Clinic Note  3/3/2020      Subjective:         Chief Complaint:   HPI  Elda Hernandez is a 49 y.o. female with a history of a liver transplant in 2002.  Also has been followed for years for a small 12 mm Bosniak 3 cyst on the right kidney. Last available imaging is a CT scan from 4/1/2019. Lesion has been followed by Dr. Sanderson and Sana. Lesion has been followed since 1999.    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    Arnold-Chiari malformation     Depression     Esophageal stricture     Essential tremor     Hypertension     Left bundle branch block     Liver fibrosis, transplanted liver 10/2/2018    Suggested on fibroscan 10/2/18    Migraine without aura     MVP (mitral valve prolapse)     Non-rheumatic mitral regurgitation 10/1/2018    Non-rheumatic tricuspid valve insufficiency 10/1/2018    Osteoporosis     Recurrent urinary tract infection     Seizures     Shingles 2007    SIADH (syndrome of inappropriate ADH production)     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    Tricuspid valve prolapse     Urolithiasis     Von Gierke disease     s/p liver transplant     No family history on file.  Social History     Socioeconomic History    Marital status:      Spouse name: Not on file    Number of children: Not on file    Years of education: Not on file    Highest education level: Not on file   Occupational History    Not on file   Social Needs    Financial resource strain: Not on file    Food insecurity:     Worry: Not on file     Inability: Not on file    Transportation needs:     Medical: Not on file     Non-medical: Not on file   Tobacco Use    Smoking status: Never Smoker   Substance and Sexual Activity    Alcohol use: No    Drug use: No    Sexual activity: Not on file   Lifestyle    Physical activity:     Days per week: Not on file     Minutes per session: Not on file    Stress: Not on file   Relationships    Social  connections:     Talks on phone: Not on file     Gets together: Not on file     Attends Jain service: Not on file     Active member of club or organization: Not on file     Attends meetings of clubs or organizations: Not on file     Relationship status: Not on file   Other Topics Concern    Not on file   Social History Narrative    Not on file     Past Surgical History:   Procedure Laterality Date    APPENDECTOMY  6/22/2007    COLONOSCOPY  5/13/2008    internal hemorrhoids    CRANIOTOMY      LAMINECTOMY  3/2001    LIVER TRANSPLANT  9/23/2002    OSSICULAR RECONSTRUCTION  10/4/1995    RIGHT REPLACEMENT PROSTHESIS for cholesteatoma    THYMECTOMY  5/2/2007    TONSILLECTOMY, ADENOIDECTOMY  1/21/2004    TOTAL ABDOMINAL HYSTERECTOMY  3/31/1994     Patient Active Problem List   Diagnosis    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    SIADH (syndrome of inappropriate ADH production)    Depression    Anxiety    Hypothyroidism    Moderate protein-calorie malnutrition    Drug-induced peripheral neuropathy    Food intolerance in adult    Right ovarian cyst    Iron deficiency anemia secondary to inadequate dietary iron intake    Dietary folate deficiency anemia    Prophylactic immunotherapy    Elevated liver enzymes    Bile duct stricture    Biliary obstruction of transplanted liver    Urinary retention with incomplete bladder emptying    Obstruction, colon    RUQ abdominal pain    Orthostatic hypotension    Epilepsy without status epilepticus, not intractable    Essential hypertension    Syncope and collapse    Fecal impaction    Delirium due to another medical condition, acute, mixed level of activity    Visual hallucination    Delirium due to multiple etiologies, acute, mixed level of activity    Chronic rejection of liver transplant    Transaminitis    Long-term use of immunosuppressant medication    Non-rheumatic mitral regurgitation    Non-rheumatic tricuspid valve  "insufficiency    Left bundle branch block    Chronic constipation    Osteoporosis    Angiolipoma of kidney    Liver fibrosis, transplanted liver    Bilateral carpal tunnel syndrome    Right renal mass     Review of Systems   Constitutional: Positive for fatigue. Negative for activity change, appetite change, fever and unexpected weight change.   HENT: Negative for nosebleeds.    Eyes: Negative.    Respiratory: Negative for shortness of breath and wheezing.    Cardiovascular: Negative for chest pain, palpitations and leg swelling.   Gastrointestinal: Negative for abdominal distention, abdominal pain, constipation, diarrhea, nausea and vomiting.   Genitourinary: Negative for dysuria and hematuria.   Musculoskeletal: Negative for arthralgias.   Neurological: Negative for dizziness, seizures and syncope.   Hematological: Negative for adenopathy.   Psychiatric/Behavioral: Negative for dysphoric mood.         Objective:      /89   Pulse 79   Resp 15   Ht 4' 11" (1.499 m)   Wt 63 kg (138 lb 14.2 oz)   BMI 28.05 kg/m²   Estimated body mass index is 28.05 kg/m² as calculated from the following:    Height as of this encounter: 4' 11" (1.499 m).    Weight as of this encounter: 63 kg (138 lb 14.2 oz).  Physical Exam   Constitutional: She is oriented to person, place, and time. She appears well-developed and well-nourished. No distress.   HENT:   Head: Atraumatic.   Neck: No tracheal deviation present.   Cardiovascular: Normal rate.    Pulmonary/Chest: Effort normal. No respiratory distress. She has no wheezes.   Abdominal: Soft. Bowel sounds are normal. She exhibits no distension and no mass. There is no tenderness. There is no rebound and no guarding.   Neurological: She is alert and oriented to person, place, and time.   Skin: Skin is warm and dry. She is not diaphoretic.     Psychiatric: She has a normal mood and affect. Her behavior is normal. Judgment and thought content normal.         Assessment and " Plan:           Problem List Items Addressed This Visit     Right renal mass - Primary          Follow up:   BMP  Renal sonogram. Will get CT scan if creatinine improved.    Gallo Rangel

## 2020-03-04 ENCOUNTER — TELEPHONE (OUTPATIENT)
Dept: UROLOGY | Facility: CLINIC | Age: 50
End: 2020-03-04

## 2020-03-04 DIAGNOSIS — N28.89 RIGHT KIDNEY MASS: Primary | ICD-10-CM

## 2020-03-04 NOTE — TELEPHONE ENCOUNTER
Detailed message left on  regarding lab results and imaging order. CT scheduled for same day/location as transplant ultrasound. Advised to call back with any questions.

## 2020-03-04 NOTE — TELEPHONE ENCOUNTER
----- Message from Gallo Rangel MD sent at 3/4/2020 10:00 AM CST -----  This is the gal we saw yesterday with Bosniak 3 lesion. Creatinine came back normal. We can cancel ultrasound and order renal mass CT abdomen. I will place the order.

## 2020-03-21 DIAGNOSIS — Z94.4 LIVER REPLACED BY TRANSPLANT: ICD-10-CM

## 2020-03-23 DIAGNOSIS — Z94.4 LIVER REPLACED BY TRANSPLANT: ICD-10-CM

## 2020-03-23 RX ORDER — MYCOPHENOLATE MOFETIL 250 MG/1
CAPSULE ORAL
Qty: 120 CAPSULE | Refills: 0 | Status: SHIPPED | OUTPATIENT
Start: 2020-03-23 | End: 2020-03-23

## 2020-03-23 RX ORDER — MYCOPHENOLATE MOFETIL 250 MG/1
500 CAPSULE ORAL 2 TIMES DAILY
Qty: 120 CAPSULE | Refills: 0 | Status: ON HOLD | OUTPATIENT
Start: 2020-03-23 | End: 2020-09-04 | Stop reason: SDUPTHER

## 2020-03-23 RX ORDER — MYCOPHENOLATE MOFETIL 250 MG/1
CAPSULE ORAL
Qty: 360 CAPSULE | Refills: 6 | Status: SHIPPED | OUTPATIENT
Start: 2020-03-23 | End: 2020-05-08 | Stop reason: SDUPTHER

## 2020-03-25 ENCOUNTER — PATIENT MESSAGE (OUTPATIENT)
Dept: UROLOGY | Facility: CLINIC | Age: 50
End: 2020-03-25

## 2020-04-09 ENCOUNTER — PATIENT MESSAGE (OUTPATIENT)
Dept: TRANSPLANT | Facility: CLINIC | Age: 50
End: 2020-04-09

## 2020-05-11 ENCOUNTER — PATIENT MESSAGE (OUTPATIENT)
Dept: TRANSPLANT | Facility: CLINIC | Age: 50
End: 2020-05-11

## 2020-05-30 ENCOUNTER — LAB VISIT (OUTPATIENT)
Dept: LAB | Facility: HOSPITAL | Age: 50
End: 2020-05-30
Attending: INTERNAL MEDICINE
Payer: MEDICARE

## 2020-05-30 DIAGNOSIS — C22.0 HEPATOCELLULAR CARCINOMA: ICD-10-CM

## 2020-05-30 DIAGNOSIS — Z94.4 LIVER REPLACED BY TRANSPLANT: ICD-10-CM

## 2020-05-30 LAB
AFP SERPL-MCNC: 3.5 NG/ML (ref 0–8.4)
ALBUMIN SERPL BCP-MCNC: 3.3 G/DL (ref 3.5–5.2)
ALP SERPL-CCNC: 466 U/L (ref 55–135)
ALT SERPL W/O P-5'-P-CCNC: 99 U/L (ref 10–44)
ANION GAP SERPL CALC-SCNC: 8 MMOL/L (ref 8–16)
AST SERPL-CCNC: 94 U/L (ref 10–40)
BASOPHILS # BLD AUTO: 0.05 K/UL (ref 0–0.2)
BASOPHILS NFR BLD: 1 % (ref 0–1.9)
BILIRUB SERPL-MCNC: 0.8 MG/DL (ref 0.1–1)
BUN SERPL-MCNC: 21 MG/DL (ref 6–20)
CALCIUM SERPL-MCNC: 9.7 MG/DL (ref 8.7–10.5)
CHLORIDE SERPL-SCNC: 103 MMOL/L (ref 95–110)
CO2 SERPL-SCNC: 29 MMOL/L (ref 23–29)
CREAT SERPL-MCNC: 0.8 MG/DL (ref 0.5–1.4)
DIFFERENTIAL METHOD: ABNORMAL
EOSINOPHIL # BLD AUTO: 0.4 K/UL (ref 0–0.5)
EOSINOPHIL NFR BLD: 7 % (ref 0–8)
ERYTHROCYTE [DISTWIDTH] IN BLOOD BY AUTOMATED COUNT: 14.5 % (ref 11.5–14.5)
EST. GFR  (AFRICAN AMERICAN): >60 ML/MIN/1.73 M^2
EST. GFR  (NON AFRICAN AMERICAN): >60 ML/MIN/1.73 M^2
GLUCOSE SERPL-MCNC: 83 MG/DL (ref 70–110)
HCT VFR BLD AUTO: 40.9 % (ref 37–48.5)
HGB BLD-MCNC: 12.5 G/DL (ref 12–16)
IMM GRANULOCYTES # BLD AUTO: 0.01 K/UL (ref 0–0.04)
IMM GRANULOCYTES NFR BLD AUTO: 0.2 % (ref 0–0.5)
INR PPP: 1.1 (ref 0.8–1.2)
LYMPHOCYTES # BLD AUTO: 1 K/UL (ref 1–4.8)
LYMPHOCYTES NFR BLD: 20.4 % (ref 18–48)
MCH RBC QN AUTO: 27 PG (ref 27–31)
MCHC RBC AUTO-ENTMCNC: 30.6 G/DL (ref 32–36)
MCV RBC AUTO: 88 FL (ref 82–98)
MONOCYTES # BLD AUTO: 0.5 K/UL (ref 0.3–1)
MONOCYTES NFR BLD: 9.6 % (ref 4–15)
NEUTROPHILS # BLD AUTO: 3.1 K/UL (ref 1.8–7.7)
NEUTROPHILS NFR BLD: 61.8 % (ref 38–73)
NRBC BLD-RTO: 0 /100 WBC
PLATELET # BLD AUTO: 128 K/UL (ref 150–350)
PMV BLD AUTO: 12.9 FL (ref 9.2–12.9)
POTASSIUM SERPL-SCNC: 3.1 MMOL/L (ref 3.5–5.1)
PROT SERPL-MCNC: 6.9 G/DL (ref 6–8.4)
PROTHROMBIN TIME: 11.2 SEC (ref 9–12.5)
RBC # BLD AUTO: 4.63 M/UL (ref 4–5.4)
SODIUM SERPL-SCNC: 140 MMOL/L (ref 136–145)
WBC # BLD AUTO: 5 K/UL (ref 3.9–12.7)

## 2020-05-30 PROCEDURE — 80197 ASSAY OF TACROLIMUS: CPT

## 2020-05-30 PROCEDURE — 85025 COMPLETE CBC W/AUTO DIFF WBC: CPT

## 2020-05-30 PROCEDURE — 36415 COLL VENOUS BLD VENIPUNCTURE: CPT | Mod: PO

## 2020-05-30 PROCEDURE — 80053 COMPREHEN METABOLIC PANEL: CPT

## 2020-05-30 PROCEDURE — 85610 PROTHROMBIN TIME: CPT | Mod: PO

## 2020-05-30 PROCEDURE — 82105 ALPHA-FETOPROTEIN SERUM: CPT

## 2020-05-31 LAB — TACROLIMUS BLD-MCNC: 8.2 NG/ML (ref 5–15)

## 2020-06-01 DIAGNOSIS — E87.6 HYPOKALEMIA: ICD-10-CM

## 2020-06-01 DIAGNOSIS — E87.6 HYPOKALEMIA: Primary | ICD-10-CM

## 2020-06-01 NOTE — TELEPHONE ENCOUNTER
----- Message from Jaya Nielsen MD sent at 6/1/2020 11:11 AM CDT -----  Please increase to KCl 40 meq daily  ----- Message -----  From: Maude Chau RN  Sent: 6/1/2020   9:10 AM CDT  To: Jaya Nielsen MD    I don't know if she is still taking it.  The last dose I had was 20 meq of K  ----- Message -----  From: Jaya Nielsen MD  Sent: 5/31/2020   1:09 PM CDT  To: [facility] Transplant Clinical    What dose of potassium is patient on?

## 2020-06-01 NOTE — TELEPHONE ENCOUNTER
Labs reviewed by Dr. Nielsen  Sent message to patient to let her know potassium was low on labs.  The doctor wants to increase Potassium dose to 40 meq daily. Repeat just potassium on 06/13/20

## 2020-06-02 RX ORDER — POTASSIUM CHLORIDE 750 MG/1
40 TABLET, EXTENDED RELEASE ORAL DAILY
Qty: 120 TABLET | Refills: 3 | Status: ON HOLD | OUTPATIENT
Start: 2020-06-02 | End: 2020-11-19 | Stop reason: HOSPADM

## 2020-06-15 DIAGNOSIS — Z94.4 LIVER REPLACED BY TRANSPLANT: Primary | ICD-10-CM

## 2020-09-03 ENCOUNTER — HOSPITAL ENCOUNTER (INPATIENT)
Facility: HOSPITAL | Age: 50
LOS: 41 days | Discharge: NURSING, REHAB OR LONG-TERM CARE | DRG: 853 | End: 2020-10-14
Attending: INTERNAL MEDICINE | Admitting: INTERNAL MEDICINE
Payer: MEDICARE

## 2020-09-03 DIAGNOSIS — R57.9 SHOCK: ICD-10-CM

## 2020-09-03 DIAGNOSIS — R07.9 CHEST PAIN: ICD-10-CM

## 2020-09-03 DIAGNOSIS — N17.9 AKI (ACUTE KIDNEY INJURY): ICD-10-CM

## 2020-09-03 DIAGNOSIS — R53.81 DEBILITY: ICD-10-CM

## 2020-09-03 DIAGNOSIS — A41.9 SEPTIC SHOCK: ICD-10-CM

## 2020-09-03 DIAGNOSIS — I95.9 HYPOTENSION: ICD-10-CM

## 2020-09-03 DIAGNOSIS — R19.8 PERFORATED ABDOMINAL VISCUS: ICD-10-CM

## 2020-09-03 DIAGNOSIS — H57.02 ANISOCORIA: ICD-10-CM

## 2020-09-03 DIAGNOSIS — K31.1 GASTRIC OUTLET OBSTRUCTION: ICD-10-CM

## 2020-09-03 DIAGNOSIS — R00.1 BRADYCARDIA: ICD-10-CM

## 2020-09-03 DIAGNOSIS — Z79.60 LONG-TERM USE OF IMMUNOSUPPRESSANT MEDICATION: ICD-10-CM

## 2020-09-03 DIAGNOSIS — R57.9 SHOCK, UNSPECIFIED: ICD-10-CM

## 2020-09-03 DIAGNOSIS — J96.01 ACUTE RESPIRATORY FAILURE WITH HYPOXIA: ICD-10-CM

## 2020-09-03 DIAGNOSIS — E87.0 HYPERNATREMIA: ICD-10-CM

## 2020-09-03 DIAGNOSIS — Z94.4 S/P LIVER TRANSPLANT: Chronic | ICD-10-CM

## 2020-09-03 DIAGNOSIS — E46 MALNUTRITION, UNSPECIFIED TYPE: ICD-10-CM

## 2020-09-03 DIAGNOSIS — R65.21 SEPTIC SHOCK: ICD-10-CM

## 2020-09-03 PROBLEM — E87.6 HYPOKALEMIA: Status: ACTIVE | Noted: 2020-09-03

## 2020-09-03 PROBLEM — R10.13 EPIGASTRIC PAIN: Status: ACTIVE | Noted: 2020-09-03

## 2020-09-03 PROCEDURE — 20600001 HC STEP DOWN PRIVATE ROOM

## 2020-09-03 NOTE — PLAN OF CARE
(Physician in Lead of Transfers)   Outside Transfer Acceptance Note / Regional Referral Center    Name:Elda Hernandez    Transferring Physician: Sam Gipson MD///Hospital Medicine    Accepting Physician: KATHLEEN Rodríguez MD    Date of Acceptance:  September 3, 2020    Transferring Facility: [facility] Med-tele     Destination Facility and Admitting Physician: [facility]     Reason for Transfer:  Needs advanced endoscopy (not available at the current facility)    Report from Transferring Physician/Hospital course: 50-year-old female with history of von Gierke disease status post orthotopic liver transplant in 2002 maintained on Prograf and CellCept, hypertension, depression, and SIADH presented to the emergency room for abdominal pain.  The patient reports of developing diffuse abdominal pain as well as associated nausea and vomiting for the past 3 and half weeks.  Her symptoms have progressively gotten worse prompting her to come to the emergency room.  She reports of not being able to tolerate liquids or solids and occasional throws up her medications.  She reports her symptoms becoming so severe she even vomits after brushing her teeth.  In the emergency room, imaging was concerning for a gastric outlet obstruction.  Gastroenterology was contacted with plans for EGD.  Hospital Medicine consulted for admission.    EGD showed grade 4 reflux esophagitis with strictures.  Unable to traverse with adult scope.  Pediatric scope not available.  Procedure was terminated.    Advanced endoscopy (Dr. West) was contacted.  Recommendation was transfer to [facility] to \[facility]\"" medicine service with consultation to advanced endoscopy.    Case discussed with hospital medicine at [facility].  She felt the patient was stable for ground transfer.    VS:  Temperature 98.7°, pulse 89, respirations 17, blood pressure 125/56, oxygen saturation 96%    Labs:  COVID negative.  See epic for further  labs.    Radiographs:  See epic for results of CT abdomen and pelvis and chest x-ray.    To Do List: Admit to   Consult advanced endoscopy (Dr. West aware)  Consider hepatology consult if needed with history of liver transplant in 2002.    Upon patient arrival to [facility], please send SecureChat to [facility] or call extension [phone] (if no answer, this will flip to a beeper, so enter your call back number) for Hospital Medicine admit team assignment and for additional admit orders for the patient.  Do not page the attending physician associated with the patient on arrival (this physician may not be on duty at the time of arrival).  Rather, always call [phone] to reach the triage physician for orders and team assignment.      KATHLEEN Rodríguez MD  Hospital Medicine Staff  Cell: [phone]

## 2020-09-04 ENCOUNTER — ANESTHESIA (OUTPATIENT)
Dept: SURGERY | Facility: HOSPITAL | Age: 50
DRG: 853 | End: 2020-09-04
Payer: MEDICARE

## 2020-09-04 ENCOUNTER — ANESTHESIA EVENT (OUTPATIENT)
Dept: SURGERY | Facility: HOSPITAL | Age: 50
DRG: 853 | End: 2020-09-04
Payer: MEDICARE

## 2020-09-04 PROBLEM — T74.91XA DOMESTIC ABUSE OF ADULT: Status: ACTIVE | Noted: 2020-09-04

## 2020-09-04 PROBLEM — R19.8 PERFORATED ABDOMINAL VISCUS: Status: ACTIVE | Noted: 2020-09-04

## 2020-09-04 PROBLEM — R53.81 DEBILITY: Status: ACTIVE | Noted: 2020-09-04

## 2020-09-04 PROBLEM — K22.2 ESOPHAGEAL STRICTURE: Status: ACTIVE | Noted: 2020-09-04

## 2020-09-04 PROBLEM — I99.8 ISCHEMIA OF RIGHT UPPER EXTREMITY: Status: ACTIVE | Noted: 2020-09-04

## 2020-09-04 PROBLEM — K20.90 ESOPHAGITIS: Status: ACTIVE | Noted: 2020-09-04

## 2020-09-04 LAB
ABO + RH BLD: NORMAL
ALBUMIN SERPL BCP-MCNC: 1.3 G/DL (ref 3.5–5.2)
ALBUMIN SERPL BCP-MCNC: 1.5 G/DL (ref 3.5–5.2)
ALBUMIN SERPL BCP-MCNC: 1.5 G/DL (ref 3.5–5.2)
ALBUMIN SERPL BCP-MCNC: 2 G/DL (ref 3.5–5.2)
ALBUMIN SERPL BCP-MCNC: 2.2 G/DL (ref 3.5–5.2)
ALBUMIN SERPL BCP-MCNC: 2.5 G/DL (ref 3.5–5.2)
ALLENS TEST: ABNORMAL
ALP SERPL-CCNC: 121 U/L (ref 55–135)
ALP SERPL-CCNC: 146 U/L (ref 55–135)
ALP SERPL-CCNC: 153 U/L (ref 55–135)
ALP SERPL-CCNC: 194 U/L (ref 55–135)
ALP SERPL-CCNC: 221 U/L (ref 55–135)
ALP SERPL-CCNC: 259 U/L (ref 55–135)
ALT SERPL W/O P-5'-P-CCNC: 14 U/L (ref 10–44)
ALT SERPL W/O P-5'-P-CCNC: 15 U/L (ref 10–44)
ALT SERPL W/O P-5'-P-CCNC: 15 U/L (ref 10–44)
ALT SERPL W/O P-5'-P-CCNC: 20 U/L (ref 10–44)
ALT SERPL W/O P-5'-P-CCNC: 30 U/L (ref 10–44)
ALT SERPL W/O P-5'-P-CCNC: 59 U/L (ref 10–44)
AMMONIA PLAS-SCNC: 46 UMOL/L (ref 10–50)
ANION GAP SERPL CALC-SCNC: 10 MMOL/L (ref 8–16)
ANION GAP SERPL CALC-SCNC: 12 MMOL/L (ref 8–16)
ANION GAP SERPL CALC-SCNC: 14 MMOL/L (ref 8–16)
ANION GAP SERPL CALC-SCNC: 15 MMOL/L (ref 8–16)
ANION GAP SERPL CALC-SCNC: 6 MMOL/L (ref 8–16)
ANION GAP SERPL CALC-SCNC: 9 MMOL/L (ref 8–16)
ANISOCYTOSIS BLD QL SMEAR: SLIGHT
APTT BLDCRRT: 30.3 SEC (ref 21–32)
APTT BLDCRRT: 33 SEC (ref 21–32)
ASCENDING AORTA: 2.92 CM
AST SERPL-CCNC: 114 U/L (ref 10–40)
AST SERPL-CCNC: 26 U/L (ref 10–40)
AST SERPL-CCNC: 26 U/L (ref 10–40)
AST SERPL-CCNC: 38 U/L (ref 10–40)
AST SERPL-CCNC: 43 U/L (ref 10–40)
AST SERPL-CCNC: 56 U/L (ref 10–40)
AV INDEX (PROSTH): 0.72
AV MEAN GRADIENT: 4 MMHG
AV PEAK GRADIENT: 10 MMHG
AV VALVE AREA: 2.25 CM2
AV VELOCITY RATIO: 0.79
BASO STIPL BLD QL SMEAR: ABNORMAL
BASOPHILS # BLD AUTO: 0.03 K/UL (ref 0–0.2)
BASOPHILS # BLD AUTO: 0.06 K/UL (ref 0–0.2)
BASOPHILS # BLD AUTO: 0.06 K/UL (ref 0–0.2)
BASOPHILS # BLD AUTO: 0.08 K/UL (ref 0–0.2)
BASOPHILS # BLD AUTO: ABNORMAL K/UL (ref 0–0.2)
BASOPHILS NFR BLD: 0.5 % (ref 0–1.9)
BASOPHILS NFR BLD: 0.7 % (ref 0–1.9)
BASOPHILS NFR BLD: 0.8 % (ref 0–1.9)
BASOPHILS NFR BLD: 0.8 % (ref 0–1.9)
BASOPHILS NFR BLD: 1 % (ref 0–1.9)
BILIRUB SERPL-MCNC: 0.5 MG/DL (ref 0.1–1)
BILIRUB SERPL-MCNC: 0.7 MG/DL (ref 0.1–1)
BILIRUB SERPL-MCNC: 0.7 MG/DL (ref 0.1–1)
BILIRUB SERPL-MCNC: 0.8 MG/DL (ref 0.1–1)
BILIRUB SERPL-MCNC: 0.9 MG/DL (ref 0.1–1)
BILIRUB SERPL-MCNC: 0.9 MG/DL (ref 0.1–1)
BLD GP AB SCN CELLS X3 SERPL QL: NORMAL
BLD PROD TYP BPU: NORMAL
BLD PROD TYP BPU: NORMAL
BLOOD UNIT EXPIRATION DATE: NORMAL
BLOOD UNIT EXPIRATION DATE: NORMAL
BLOOD UNIT TYPE CODE: 7300
BLOOD UNIT TYPE CODE: 7300
BLOOD UNIT TYPE: NORMAL
BLOOD UNIT TYPE: NORMAL
BNP SERPL-MCNC: 143 PG/ML (ref 0–99)
BSA FOR ECHO PROCEDURE: 1.52 M2
BUN SERPL-MCNC: 28 MG/DL (ref 6–20)
BUN SERPL-MCNC: 32 MG/DL (ref 6–20)
BUN SERPL-MCNC: 33 MG/DL (ref 6–20)
BUN SERPL-MCNC: 34 MG/DL (ref 6–20)
BUN SERPL-MCNC: 34 MG/DL (ref 6–20)
BUN SERPL-MCNC: 35 MG/DL (ref 6–20)
BURR CELLS BLD QL SMEAR: ABNORMAL
BURR CELLS BLD QL SMEAR: ABNORMAL
CA-I BLDV-SCNC: 1.19 MMOL/L (ref 1.06–1.42)
CALCIUM SERPL-MCNC: 6.8 MG/DL (ref 8.7–10.5)
CALCIUM SERPL-MCNC: 6.9 MG/DL (ref 8.7–10.5)
CALCIUM SERPL-MCNC: 7.5 MG/DL (ref 8.7–10.5)
CALCIUM SERPL-MCNC: 7.9 MG/DL (ref 8.7–10.5)
CALCIUM SERPL-MCNC: 8.1 MG/DL (ref 8.7–10.5)
CALCIUM SERPL-MCNC: 8.3 MG/DL (ref 8.7–10.5)
CHLORIDE SERPL-SCNC: 104 MMOL/L (ref 95–110)
CHLORIDE SERPL-SCNC: 109 MMOL/L (ref 95–110)
CHLORIDE SERPL-SCNC: 109 MMOL/L (ref 95–110)
CHLORIDE SERPL-SCNC: 111 MMOL/L (ref 95–110)
CHLORIDE SERPL-SCNC: 112 MMOL/L (ref 95–110)
CHLORIDE SERPL-SCNC: 112 MMOL/L (ref 95–110)
CO2 SERPL-SCNC: 12 MMOL/L (ref 23–29)
CO2 SERPL-SCNC: 13 MMOL/L (ref 23–29)
CO2 SERPL-SCNC: 13 MMOL/L (ref 23–29)
CO2 SERPL-SCNC: 16 MMOL/L (ref 23–29)
CO2 SERPL-SCNC: 16 MMOL/L (ref 23–29)
CO2 SERPL-SCNC: 17 MMOL/L (ref 23–29)
CODING SYSTEM: NORMAL
CODING SYSTEM: NORMAL
CREAT SERPL-MCNC: 1.8 MG/DL (ref 0.5–1.4)
CREAT SERPL-MCNC: 2.1 MG/DL (ref 0.5–1.4)
CREAT SERPL-MCNC: 2.2 MG/DL (ref 0.5–1.4)
CREAT SERPL-MCNC: 2.3 MG/DL (ref 0.5–1.4)
CREAT SERPL-MCNC: 2.4 MG/DL (ref 0.5–1.4)
CREAT SERPL-MCNC: 2.6 MG/DL (ref 0.5–1.4)
CV ECHO LV RWT: 0.48 CM
D DIMER PPP IA.FEU-MCNC: 4.67 MG/L FEU
DELSYS: ABNORMAL
DIFFERENTIAL METHOD: ABNORMAL
DISPENSE STATUS: NORMAL
DISPENSE STATUS: NORMAL
DOP CALC AO PEAK VEL: 1.57 M/S
DOP CALC AO VTI: 21.26 CM
DOP CALC LVOT AREA: 3.1 CM2
DOP CALC LVOT DIAMETER: 1.99 CM
DOP CALC LVOT PEAK VEL: 1.24 M/S
DOP CALC LVOT STROKE VOLUME: 47.9 CM3
DOP CALCLVOT PEAK VEL VTI: 15.41 CM
E WAVE DECELERATION TIME: 133.28 MSEC
E/A RATIO: 0.69
E/E' RATIO: 25.67 M/S
ECHO LV POSTERIOR WALL: 0.9 CM (ref 0.6–1.1)
EOSINOPHIL # BLD AUTO: 0 K/UL (ref 0–0.5)
EOSINOPHIL # BLD AUTO: ABNORMAL K/UL (ref 0–0.5)
EOSINOPHIL NFR BLD: 0 % (ref 0–8)
EOSINOPHIL NFR BLD: 0.1 % (ref 0–8)
ERYTHROCYTE [DISTWIDTH] IN BLOOD BY AUTOMATED COUNT: 14.5 % (ref 11.5–14.5)
ERYTHROCYTE [DISTWIDTH] IN BLOOD BY AUTOMATED COUNT: 14.6 % (ref 11.5–14.5)
ERYTHROCYTE [DISTWIDTH] IN BLOOD BY AUTOMATED COUNT: 14.6 % (ref 11.5–14.5)
EST. GFR  (AFRICAN AMERICAN): 23.6 ML/MIN/1.73 M^2
EST. GFR  (AFRICAN AMERICAN): 26 ML/MIN/1.73 M^2
EST. GFR  (AFRICAN AMERICAN): 27.3 ML/MIN/1.73 M^2
EST. GFR  (AFRICAN AMERICAN): 28.9 ML/MIN/1.73 M^2
EST. GFR  (AFRICAN AMERICAN): 30.5 ML/MIN/1.73 M^2
EST. GFR  (AFRICAN AMERICAN): 36.8 ML/MIN/1.73 M^2
EST. GFR  (NON AFRICAN AMERICAN): 20.5 ML/MIN/1.73 M^2
EST. GFR  (NON AFRICAN AMERICAN): 22.5 ML/MIN/1.73 M^2
EST. GFR  (NON AFRICAN AMERICAN): 23.7 ML/MIN/1.73 M^2
EST. GFR  (NON AFRICAN AMERICAN): 25 ML/MIN/1.73 M^2
EST. GFR  (NON AFRICAN AMERICAN): 26.5 ML/MIN/1.73 M^2
EST. GFR  (NON AFRICAN AMERICAN): 31.9 ML/MIN/1.73 M^2
FRACTIONAL SHORTENING: 17 % (ref 28–44)
GIANT PLATELETS BLD QL SMEAR: PRESENT
GLUCOSE SERPL-MCNC: 101 MG/DL (ref 70–110)
GLUCOSE SERPL-MCNC: 111 MG/DL (ref 70–110)
GLUCOSE SERPL-MCNC: 114 MG/DL (ref 70–110)
GLUCOSE SERPL-MCNC: 133 MG/DL (ref 70–110)
GLUCOSE SERPL-MCNC: 458 MG/DL (ref 70–110)
GLUCOSE SERPL-MCNC: 86 MG/DL (ref 70–110)
GLUCOSE SERPL-MCNC: 88 MG/DL (ref 70–110)
HCO3 UR-SCNC: 13.5 MMOL/L (ref 24–28)
HCO3 UR-SCNC: 13.7 MMOL/L (ref 24–28)
HCO3 UR-SCNC: 18.7 MMOL/L (ref 24–28)
HCO3 UR-SCNC: 6.5 MMOL/L (ref 24–28)
HCT VFR BLD AUTO: 33.4 % (ref 37–48.5)
HCT VFR BLD AUTO: 34.8 % (ref 37–48.5)
HCT VFR BLD AUTO: 41.4 % (ref 37–48.5)
HCT VFR BLD AUTO: 44.7 % (ref 37–48.5)
HCT VFR BLD AUTO: 47.2 % (ref 37–48.5)
HCT VFR BLD CALC: 18 %PCV (ref 36–54)
HCT VFR BLD CALC: 19 %PCV (ref 36–54)
HCT VFR BLD CALC: 32 %PCV (ref 36–54)
HCT VFR BLD CALC: 32 %PCV (ref 36–54)
HGB BLD-MCNC: 10.6 G/DL (ref 12–16)
HGB BLD-MCNC: 11 G/DL (ref 12–16)
HGB BLD-MCNC: 13.2 G/DL (ref 12–16)
HGB BLD-MCNC: 14.3 G/DL (ref 12–16)
HGB BLD-MCNC: 15 G/DL (ref 12–16)
IMM GRANULOCYTES # BLD AUTO: 0.04 K/UL (ref 0–0.04)
IMM GRANULOCYTES # BLD AUTO: 0.05 K/UL (ref 0–0.04)
IMM GRANULOCYTES # BLD AUTO: 0.06 K/UL (ref 0–0.04)
IMM GRANULOCYTES # BLD AUTO: 0.09 K/UL (ref 0–0.04)
IMM GRANULOCYTES # BLD AUTO: ABNORMAL K/UL (ref 0–0.04)
IMM GRANULOCYTES NFR BLD AUTO: 0.6 % (ref 0–0.5)
IMM GRANULOCYTES NFR BLD AUTO: 0.6 % (ref 0–0.5)
IMM GRANULOCYTES NFR BLD AUTO: 0.7 % (ref 0–0.5)
IMM GRANULOCYTES NFR BLD AUTO: 1.1 % (ref 0–0.5)
IMM GRANULOCYTES NFR BLD AUTO: ABNORMAL % (ref 0–0.5)
INR PPP: 1.2 (ref 0.8–1.2)
INR PPP: 1.3 (ref 0.8–1.2)
INTERVENTRICULAR SEPTUM: 0.9 CM (ref 0.6–1.1)
LA MAJOR: 5.22 CM
LA MINOR: 5.25 CM
LA WIDTH: 3.5 CM
LACTATE SERPL-SCNC: 3.9 MMOL/L (ref 0.5–2.2)
LACTATE SERPL-SCNC: 4.1 MMOL/L (ref 0.5–2.2)
LACTATE SERPL-SCNC: 4.7 MMOL/L (ref 0.5–2.2)
LDH SERPL L TO P-CCNC: 1.7 MMOL/L (ref 0.36–1.25)
LDH SERPL L TO P-CCNC: 254 U/L (ref 110–260)
LEFT ATRIUM SIZE: 3.4 CM
LEFT ATRIUM VOLUME INDEX: 35.3 ML/M2
LEFT ATRIUM VOLUME: 52.95 CM3
LEFT INTERNAL DIMENSION IN SYSTOLE: 3.13 CM (ref 2.1–4)
LEFT VENTRICLE DIASTOLIC VOLUME INDEX: 40.81 ML/M2
LEFT VENTRICLE DIASTOLIC VOLUME: 61.21 ML
LEFT VENTRICLE MASS INDEX: 67 G/M2
LEFT VENTRICLE SYSTOLIC VOLUME INDEX: 25.9 ML/M2
LEFT VENTRICLE SYSTOLIC VOLUME: 38.87 ML
LEFT VENTRICULAR INTERNAL DIMENSION IN DIASTOLE: 3.78 CM (ref 3.5–6)
LEFT VENTRICULAR MASS: 100.22 G
LV LATERAL E/E' RATIO: 25.67 M/S
LV SEPTAL E/E' RATIO: 25.67 M/S
LYMPHOCYTES # BLD AUTO: 1.3 K/UL (ref 1–4.8)
LYMPHOCYTES # BLD AUTO: 1.4 K/UL (ref 1–4.8)
LYMPHOCYTES # BLD AUTO: 1.6 K/UL (ref 1–4.8)
LYMPHOCYTES # BLD AUTO: 1.7 K/UL (ref 1–4.8)
LYMPHOCYTES # BLD AUTO: ABNORMAL K/UL (ref 1–4.8)
LYMPHOCYTES NFR BLD: 16.6 % (ref 18–48)
LYMPHOCYTES NFR BLD: 17.5 % (ref 18–48)
LYMPHOCYTES NFR BLD: 20 % (ref 18–48)
LYMPHOCYTES NFR BLD: 20.6 % (ref 18–48)
LYMPHOCYTES NFR BLD: 21.5 % (ref 18–48)
MAGNESIUM SERPL-MCNC: 1.1 MG/DL (ref 1.6–2.6)
MAGNESIUM SERPL-MCNC: 1.2 MG/DL (ref 1.6–2.6)
MAGNESIUM SERPL-MCNC: 1.3 MG/DL (ref 1.6–2.6)
MAGNESIUM SERPL-MCNC: 1.4 MG/DL (ref 1.6–2.6)
MAGNESIUM SERPL-MCNC: 1.9 MG/DL (ref 1.6–2.6)
MCH RBC QN AUTO: 27.2 PG (ref 27–31)
MCH RBC QN AUTO: 27.3 PG (ref 27–31)
MCH RBC QN AUTO: 27.4 PG (ref 27–31)
MCH RBC QN AUTO: 27.6 PG (ref 27–31)
MCH RBC QN AUTO: 27.6 PG (ref 27–31)
MCHC RBC AUTO-ENTMCNC: 31.6 G/DL (ref 32–36)
MCHC RBC AUTO-ENTMCNC: 31.7 G/DL (ref 32–36)
MCHC RBC AUTO-ENTMCNC: 31.8 G/DL (ref 32–36)
MCHC RBC AUTO-ENTMCNC: 31.9 G/DL (ref 32–36)
MCHC RBC AUTO-ENTMCNC: 32 G/DL (ref 32–36)
MCV RBC AUTO: 85 FL (ref 82–98)
MCV RBC AUTO: 86 FL (ref 82–98)
MCV RBC AUTO: 86 FL (ref 82–98)
MCV RBC AUTO: 87 FL (ref 82–98)
MCV RBC AUTO: 87 FL (ref 82–98)
METAMYELOCYTES NFR BLD MANUAL: 1 %
MODE: ABNORMAL
MONOCYTES # BLD AUTO: 0.2 K/UL (ref 0.3–1)
MONOCYTES # BLD AUTO: 0.8 K/UL (ref 0.3–1)
MONOCYTES # BLD AUTO: 0.9 K/UL (ref 0.3–1)
MONOCYTES # BLD AUTO: 1.2 K/UL (ref 0.3–1)
MONOCYTES # BLD AUTO: ABNORMAL K/UL (ref 0.3–1)
MONOCYTES NFR BLD: 11.4 % (ref 4–15)
MONOCYTES NFR BLD: 12.4 % (ref 4–15)
MONOCYTES NFR BLD: 12.8 % (ref 4–15)
MONOCYTES NFR BLD: 3 % (ref 4–15)
MONOCYTES NFR BLD: 8 % (ref 4–15)
MV PEAK A VEL: 1.11 M/S
MV PEAK E VEL: 0.77 M/S
MV STENOSIS PRESSURE HALF TIME: 38.65 MS
MV VALVE AREA P 1/2 METHOD: 5.69 CM2
NEUTROPHILS # BLD AUTO: 4.3 K/UL (ref 1.8–7.7)
NEUTROPHILS # BLD AUTO: 5.4 K/UL (ref 1.8–7.7)
NEUTROPHILS # BLD AUTO: 5.8 K/UL (ref 1.8–7.7)
NEUTROPHILS # BLD AUTO: 6.5 K/UL (ref 1.8–7.7)
NEUTROPHILS NFR BLD: 55 % (ref 38–73)
NEUTROPHILS NFR BLD: 65.9 % (ref 38–73)
NEUTROPHILS NFR BLD: 68.2 % (ref 38–73)
NEUTROPHILS NFR BLD: 70.2 % (ref 38–73)
NEUTROPHILS NFR BLD: 74 % (ref 38–73)
NEUTS BAND NFR BLD MANUAL: 15 %
NRBC BLD-RTO: 0 /100 WBC
NRBC BLD-RTO: 1 /100 WBC
NRBC BLD-RTO: 1 /100 WBC
NUM UNITS TRANS PACKED RBC: NORMAL
NUM UNITS TRANS PACKED RBC: NORMAL
OSMOLALITY UR: 313 MOSM/KG (ref 50–1200)
OVALOCYTES BLD QL SMEAR: ABNORMAL
OVALOCYTES BLD QL SMEAR: ABNORMAL
PCO2 BLDA: 15 MMHG (ref 35–45)
PCO2 BLDA: 33.1 MMHG (ref 35–45)
PCO2 BLDA: 33.1 MMHG (ref 35–45)
PCO2 BLDA: 34.4 MMHG (ref 35–45)
PH SMN: 7.2 [PH] (ref 7.35–7.45)
PH SMN: 7.23 [PH] (ref 7.35–7.45)
PH SMN: 7.25 [PH] (ref 7.35–7.45)
PH SMN: 7.36 [PH] (ref 7.35–7.45)
PHOSPHATE SERPL-MCNC: 2.9 MG/DL (ref 2.7–4.5)
PHOSPHATE SERPL-MCNC: 2.9 MG/DL (ref 2.7–4.5)
PHOSPHATE SERPL-MCNC: 3 MG/DL (ref 2.7–4.5)
PHOSPHATE SERPL-MCNC: 3.1 MG/DL (ref 2.7–4.5)
PHOSPHATE SERPL-MCNC: 3.2 MG/DL (ref 2.7–4.5)
PISA MRMAX VEL: 0.06 M/S
PISA TR MAX VEL: 2.5 M/S
PLATELET # BLD AUTO: 239 K/UL (ref 150–350)
PLATELET # BLD AUTO: 283 K/UL (ref 150–350)
PLATELET # BLD AUTO: 371 K/UL (ref 150–350)
PLATELET # BLD AUTO: 377 K/UL (ref 150–350)
PLATELET # BLD AUTO: 388 K/UL (ref 150–350)
PLATELET BLD QL SMEAR: ABNORMAL
PMV BLD AUTO: 10.8 FL (ref 9.2–12.9)
PMV BLD AUTO: 11 FL (ref 9.2–12.9)
PMV BLD AUTO: 11.1 FL (ref 9.2–12.9)
PMV BLD AUTO: 11.2 FL (ref 9.2–12.9)
PMV BLD AUTO: 11.2 FL (ref 9.2–12.9)
PO2 BLDA: 155 MMHG (ref 80–100)
PO2 BLDA: 181 MMHG (ref 80–100)
PO2 BLDA: 91 MMHG (ref 80–100)
PO2 BLDA: 95 MMHG (ref 80–100)
POC BE: -14 MMOL/L
POC BE: -15 MMOL/L
POC BE: -21 MMOL/L
POC BE: -7 MMOL/L
POC IONIZED CALCIUM: 0.66 MMOL/L (ref 1.06–1.42)
POC IONIZED CALCIUM: 1.13 MMOL/L (ref 1.06–1.42)
POC IONIZED CALCIUM: 1.19 MMOL/L (ref 1.06–1.42)
POC IONIZED CALCIUM: 1.39 MMOL/L (ref 1.06–1.42)
POC SATURATED O2: 100 % (ref 95–100)
POC SATURATED O2: 95 % (ref 95–100)
POC SATURATED O2: 96 % (ref 95–100)
POC SATURATED O2: 99 % (ref 95–100)
POC TCO2: 15 MMOL/L (ref 23–27)
POC TCO2: 15 MMOL/L (ref 23–27)
POC TCO2: 20 MMOL/L (ref 23–27)
POC TCO2: 7 MMOL/L (ref 23–27)
POCT GLUCOSE: 109 MG/DL (ref 70–110)
POCT GLUCOSE: 122 MG/DL (ref 70–110)
POCT GLUCOSE: 126 MG/DL (ref 70–110)
POCT GLUCOSE: 132 MG/DL (ref 70–110)
POCT GLUCOSE: 418 MG/DL (ref 70–110)
POCT GLUCOSE: 60 MG/DL (ref 70–110)
POCT GLUCOSE: 78 MG/DL (ref 70–110)
POCT GLUCOSE: 78 MG/DL (ref 70–110)
POCT GLUCOSE: 87 MG/DL (ref 70–110)
POCT GLUCOSE: 88 MG/DL (ref 70–110)
POCT GLUCOSE: 93 MG/DL (ref 70–110)
POCT GLUCOSE: 96 MG/DL (ref 70–110)
POIKILOCYTOSIS BLD QL SMEAR: SLIGHT
POIKILOCYTOSIS BLD QL SMEAR: SLIGHT
POLYCHROMASIA BLD QL SMEAR: ABNORMAL
POLYCHROMASIA BLD QL SMEAR: ABNORMAL
POTASSIUM BLD-SCNC: 3.2 MMOL/L (ref 3.5–5.1)
POTASSIUM BLD-SCNC: 3.9 MMOL/L (ref 3.5–5.1)
POTASSIUM BLD-SCNC: 4.4 MMOL/L (ref 3.5–5.1)
POTASSIUM BLD-SCNC: <2 MMOL/L (ref 3.5–5.1)
POTASSIUM SERPL-SCNC: 3.2 MMOL/L (ref 3.5–5.1)
POTASSIUM SERPL-SCNC: 3.5 MMOL/L (ref 3.5–5.1)
POTASSIUM SERPL-SCNC: 3.6 MMOL/L (ref 3.5–5.1)
POTASSIUM SERPL-SCNC: 3.8 MMOL/L (ref 3.5–5.1)
POTASSIUM SERPL-SCNC: 3.8 MMOL/L (ref 3.5–5.1)
POTASSIUM SERPL-SCNC: 4.2 MMOL/L (ref 3.5–5.1)
PROT SERPL-MCNC: 3.4 G/DL (ref 6–8.4)
PROT SERPL-MCNC: 3.8 G/DL (ref 6–8.4)
PROT SERPL-MCNC: 4 G/DL (ref 6–8.4)
PROT SERPL-MCNC: 4.9 G/DL (ref 6–8.4)
PROT SERPL-MCNC: 5.3 G/DL (ref 6–8.4)
PROT SERPL-MCNC: 6.1 G/DL (ref 6–8.4)
PROTHROMBIN TIME: 12.9 SEC (ref 9–12.5)
PROTHROMBIN TIME: 14.6 SEC (ref 9–12.5)
RA MAJOR: 4.16 CM
RA WIDTH: 2.26 CM
RBC # BLD AUTO: 3.87 M/UL (ref 4–5.4)
RBC # BLD AUTO: 3.98 M/UL (ref 4–5.4)
RBC # BLD AUTO: 4.78 M/UL (ref 4–5.4)
RBC # BLD AUTO: 5.24 M/UL (ref 4–5.4)
RBC # BLD AUTO: 5.51 M/UL (ref 4–5.4)
RIGHT VENTRICULAR END-DIASTOLIC DIMENSION: 2.91 CM
SAMPLE: ABNORMAL
SINUS: 2.87 CM
SITE: ABNORMAL
SODIUM BLD-SCNC: 136 MMOL/L (ref 136–145)
SODIUM BLD-SCNC: 139 MMOL/L (ref 136–145)
SODIUM BLD-SCNC: 142 MMOL/L (ref 136–145)
SODIUM BLD-SCNC: 152 MMOL/L (ref 136–145)
SODIUM SERPL-SCNC: 131 MMOL/L (ref 136–145)
SODIUM SERPL-SCNC: 133 MMOL/L (ref 136–145)
SODIUM SERPL-SCNC: 135 MMOL/L (ref 136–145)
SODIUM SERPL-SCNC: 137 MMOL/L (ref 136–145)
STJ: 2.65 CM
T4 FREE SERPL-MCNC: 1.11 NG/DL (ref 0.71–1.51)
TACROLIMUS BLD-MCNC: 5.7 NG/ML (ref 5–15)
TACROLIMUS BLD-MCNC: 6 NG/ML (ref 5–15)
TDI LATERAL: 0.03 M/S
TDI SEPTAL: 0.03 M/S
TDI: 0.03 M/S
TR MAX PG: 25 MMHG
TRICUSPID ANNULAR PLANE SYSTOLIC EXCURSION: 1.68 CM
TROPONIN I SERPL DL<=0.01 NG/ML-MCNC: 0.05 NG/ML (ref 0–0.03)
TROPONIN I SERPL DL<=0.01 NG/ML-MCNC: 0.07 NG/ML (ref 0–0.03)
TSH SERPL DL<=0.005 MIU/L-ACNC: 1.36 UIU/ML (ref 0.4–4)
WBC # BLD AUTO: 4.62 K/UL (ref 3.9–12.7)
WBC # BLD AUTO: 6.47 K/UL (ref 3.9–12.7)
WBC # BLD AUTO: 7.29 K/UL (ref 3.9–12.7)
WBC # BLD AUTO: 8.19 K/UL (ref 3.9–12.7)
WBC # BLD AUTO: 9.49 K/UL (ref 3.9–12.7)

## 2020-09-04 PROCEDURE — 80053 COMPREHEN METABOLIC PANEL: CPT | Mod: 91

## 2020-09-04 PROCEDURE — 94002 VENT MGMT INPAT INIT DAY: CPT

## 2020-09-04 PROCEDURE — 63600175 PHARM REV CODE 636 W HCPCS: Performed by: INTERNAL MEDICINE

## 2020-09-04 PROCEDURE — 31500 INTUBATION: ICD-10-PCS | Mod: ,,, | Performed by: INTERNAL MEDICINE

## 2020-09-04 PROCEDURE — C9113 INJ PANTOPRAZOLE SODIUM, VIA: HCPCS | Performed by: STUDENT IN AN ORGANIZED HEALTH CARE EDUCATION/TRAINING PROGRAM

## 2020-09-04 PROCEDURE — 93010 ELECTROCARDIOGRAM REPORT: CPT | Mod: ,,, | Performed by: INTERNAL MEDICINE

## 2020-09-04 PROCEDURE — 83735 ASSAY OF MAGNESIUM: CPT | Mod: 91

## 2020-09-04 PROCEDURE — 36000707: Performed by: SURGERY

## 2020-09-04 PROCEDURE — 99223 PR INITIAL HOSPITAL CARE,LEVL III: ICD-10-PCS | Mod: GC,,, | Performed by: STUDENT IN AN ORGANIZED HEALTH CARE EDUCATION/TRAINING PROGRAM

## 2020-09-04 PROCEDURE — 84133 ASSAY OF URINE POTASSIUM: CPT

## 2020-09-04 PROCEDURE — 99223 1ST HOSP IP/OBS HIGH 75: CPT | Mod: GC,,, | Performed by: STUDENT IN AN ORGANIZED HEALTH CARE EDUCATION/TRAINING PROGRAM

## 2020-09-04 PROCEDURE — 82330 ASSAY OF CALCIUM: CPT

## 2020-09-04 PROCEDURE — 82803 BLOOD GASES ANY COMBINATION: CPT

## 2020-09-04 PROCEDURE — 87040 BLOOD CULTURE FOR BACTERIA: CPT

## 2020-09-04 PROCEDURE — 63600175 PHARM REV CODE 636 W HCPCS: Performed by: STUDENT IN AN ORGANIZED HEALTH CARE EDUCATION/TRAINING PROGRAM

## 2020-09-04 PROCEDURE — 80197 ASSAY OF TACROLIMUS: CPT

## 2020-09-04 PROCEDURE — 85027 COMPLETE CBC AUTOMATED: CPT

## 2020-09-04 PROCEDURE — 25000003 PHARM REV CODE 250: Performed by: INTERNAL MEDICINE

## 2020-09-04 PROCEDURE — 84100 ASSAY OF PHOSPHORUS: CPT | Mod: 91

## 2020-09-04 PROCEDURE — 85014 HEMATOCRIT: CPT

## 2020-09-04 PROCEDURE — 36620 INSERTION CATHETER ARTERY: CPT | Mod: 59,,, | Performed by: INTERNAL MEDICINE

## 2020-09-04 PROCEDURE — 84300 ASSAY OF URINE SODIUM: CPT

## 2020-09-04 PROCEDURE — 25000003 PHARM REV CODE 250

## 2020-09-04 PROCEDURE — 99291 CRITICAL CARE FIRST HOUR: CPT | Mod: 25,GC,, | Performed by: INTERNAL MEDICINE

## 2020-09-04 PROCEDURE — 27201423 OPTIME MED/SURG SUP & DEVICES STERILE SUPPLY: Performed by: SURGERY

## 2020-09-04 PROCEDURE — 93010 EKG 12-LEAD: ICD-10-PCS | Mod: ,,, | Performed by: INTERNAL MEDICINE

## 2020-09-04 PROCEDURE — 83605 ASSAY OF LACTIC ACID: CPT | Mod: 91

## 2020-09-04 PROCEDURE — 83735 ASSAY OF MAGNESIUM: CPT

## 2020-09-04 PROCEDURE — 84484 ASSAY OF TROPONIN QUANT: CPT | Mod: 91

## 2020-09-04 PROCEDURE — 99900026 HC AIRWAY MAINTENANCE (STAT)

## 2020-09-04 PROCEDURE — 83605 ASSAY OF LACTIC ACID: CPT

## 2020-09-04 PROCEDURE — 84443 ASSAY THYROID STIM HORMONE: CPT

## 2020-09-04 PROCEDURE — 84295 ASSAY OF SERUM SODIUM: CPT

## 2020-09-04 PROCEDURE — 94761 N-INVAS EAR/PLS OXIMETRY MLT: CPT

## 2020-09-04 PROCEDURE — 99291 CRITICAL CARE FIRST HOUR: CPT | Mod: 24,GC,, | Performed by: SURGERY

## 2020-09-04 PROCEDURE — 25000003 PHARM REV CODE 250: Performed by: STUDENT IN AN ORGANIZED HEALTH CARE EDUCATION/TRAINING PROGRAM

## 2020-09-04 PROCEDURE — 85610 PROTHROMBIN TIME: CPT | Mod: 91

## 2020-09-04 PROCEDURE — 83935 ASSAY OF URINE OSMOLALITY: CPT

## 2020-09-04 PROCEDURE — 82436 ASSAY OF URINE CHLORIDE: CPT

## 2020-09-04 PROCEDURE — 36415 COLL VENOUS BLD VENIPUNCTURE: CPT

## 2020-09-04 PROCEDURE — 85610 PROTHROMBIN TIME: CPT

## 2020-09-04 PROCEDURE — 85007 BL SMEAR W/DIFF WBC COUNT: CPT

## 2020-09-04 PROCEDURE — 43840 GSTRRPHY SUTR DUOL/GSTR ULCR: CPT | Mod: ,,, | Performed by: SURGERY

## 2020-09-04 PROCEDURE — 93005 ELECTROCARDIOGRAM TRACING: CPT

## 2020-09-04 PROCEDURE — 83880 ASSAY OF NATRIURETIC PEPTIDE: CPT

## 2020-09-04 PROCEDURE — 20000000 HC ICU ROOM

## 2020-09-04 PROCEDURE — 84100 ASSAY OF PHOSPHORUS: CPT

## 2020-09-04 PROCEDURE — 37799 UNLISTED PX VASCULAR SURGERY: CPT

## 2020-09-04 PROCEDURE — 85025 COMPLETE CBC W/AUTO DIFF WBC: CPT

## 2020-09-04 PROCEDURE — 84132 ASSAY OF SERUM POTASSIUM: CPT

## 2020-09-04 PROCEDURE — 99900035 HC TECH TIME PER 15 MIN (STAT)

## 2020-09-04 PROCEDURE — 31500 INSERT EMERGENCY AIRWAY: CPT | Mod: ,,, | Performed by: INTERNAL MEDICINE

## 2020-09-04 PROCEDURE — 99291 PR CRITICAL CARE, E/M 30-74 MINUTES: ICD-10-PCS | Mod: 25,GC,, | Performed by: INTERNAL MEDICINE

## 2020-09-04 PROCEDURE — 36620 ARTERIAL LINE: ICD-10-PCS | Mod: 59,,, | Performed by: INTERNAL MEDICINE

## 2020-09-04 PROCEDURE — 36000706: Performed by: SURGERY

## 2020-09-04 PROCEDURE — D9220A PRA ANESTHESIA: Mod: ,,, | Performed by: ANESTHESIOLOGY

## 2020-09-04 PROCEDURE — 86920 COMPATIBILITY TEST SPIN: CPT

## 2020-09-04 PROCEDURE — 82140 ASSAY OF AMMONIA: CPT

## 2020-09-04 PROCEDURE — 97605 NEG PRS WND THER DME<=50SQCM: CPT | Mod: ,,, | Performed by: SURGERY

## 2020-09-04 PROCEDURE — 36556 PR INSERT NON-TUNNEL CV CATH 5+ YRS OLD: ICD-10-PCS | Mod: ,,, | Performed by: NURSE PRACTITIONER

## 2020-09-04 PROCEDURE — 83615 LACTATE (LD) (LDH) ENZYME: CPT

## 2020-09-04 PROCEDURE — 63600175 PHARM REV CODE 636 W HCPCS

## 2020-09-04 PROCEDURE — 43840 PR REPAIR, PERF DUOD/GAST ULC-WND/INJ: ICD-10-PCS | Mod: ,,, | Performed by: SURGERY

## 2020-09-04 PROCEDURE — 25000003 PHARM REV CODE 250: Performed by: NURSE PRACTITIONER

## 2020-09-04 PROCEDURE — 99291 PR CRITICAL CARE, E/M 30-74 MINUTES: ICD-10-PCS | Mod: 24,GC,, | Performed by: SURGERY

## 2020-09-04 PROCEDURE — 85730 THROMBOPLASTIN TIME PARTIAL: CPT | Mod: 91

## 2020-09-04 PROCEDURE — 99223 1ST HOSP IP/OBS HIGH 75: CPT | Mod: 57,,, | Performed by: SURGERY

## 2020-09-04 PROCEDURE — 85730 THROMBOPLASTIN TIME PARTIAL: CPT

## 2020-09-04 PROCEDURE — 85379 FIBRIN DEGRADATION QUANT: CPT

## 2020-09-04 PROCEDURE — 86850 RBC ANTIBODY SCREEN: CPT

## 2020-09-04 PROCEDURE — 37000008 HC ANESTHESIA 1ST 15 MINUTES: Performed by: SURGERY

## 2020-09-04 PROCEDURE — 84439 ASSAY OF FREE THYROXINE: CPT

## 2020-09-04 PROCEDURE — 84484 ASSAY OF TROPONIN QUANT: CPT

## 2020-09-04 PROCEDURE — 82570 ASSAY OF URINE CREATININE: CPT

## 2020-09-04 PROCEDURE — 36556 INSERT NON-TUNNEL CV CATH: CPT | Mod: ,,, | Performed by: NURSE PRACTITIONER

## 2020-09-04 PROCEDURE — 97605 PR NEG PRESS WOUND THERAPY (NPWT) W/NON-DISPOSABLE WOUND VAC DEVICE (DME), <=50 CM: ICD-10-PCS | Mod: ,,, | Performed by: SURGERY

## 2020-09-04 PROCEDURE — D9220A PRA ANESTHESIA: ICD-10-PCS | Mod: ,,, | Performed by: ANESTHESIOLOGY

## 2020-09-04 PROCEDURE — 99223 PR INITIAL HOSPITAL CARE,LEVL III: ICD-10-PCS | Mod: 57,,, | Performed by: SURGERY

## 2020-09-04 PROCEDURE — 37000009 HC ANESTHESIA EA ADD 15 MINS: Performed by: SURGERY

## 2020-09-04 RX ORDER — TACROLIMUS 1 MG/1
2 CAPSULE ORAL EVERY MORNING
Status: DISCONTINUED | OUTPATIENT
Start: 2020-09-04 | End: 2020-09-04

## 2020-09-04 RX ORDER — TACROLIMUS 1 MG/1
1 CAPSULE ORAL EVERY EVENING
Status: DISCONTINUED | OUTPATIENT
Start: 2020-09-04 | End: 2020-09-04

## 2020-09-04 RX ORDER — PROPOFOL 10 MG/ML
5 INJECTION, EMULSION INTRAVENOUS CONTINUOUS
Status: DISCONTINUED | OUTPATIENT
Start: 2020-09-04 | End: 2020-09-08

## 2020-09-04 RX ORDER — ROCURONIUM BROMIDE 10 MG/ML
60 INJECTION, SOLUTION INTRAVENOUS ONCE
Status: COMPLETED | OUTPATIENT
Start: 2020-09-04 | End: 2020-09-04

## 2020-09-04 RX ORDER — FENTANYL CITRATE-0.9 % NACL/PF 10 MCG/ML
PLASTIC BAG, INJECTION (ML) INTRAVENOUS CONTINUOUS
Status: DISCONTINUED | OUTPATIENT
Start: 2020-09-04 | End: 2020-09-08

## 2020-09-04 RX ORDER — CEFAZOLIN SODIUM 1 G/3ML
INJECTION, POWDER, FOR SOLUTION INTRAMUSCULAR; INTRAVENOUS
Status: DISCONTINUED | OUTPATIENT
Start: 2020-09-04 | End: 2020-09-04

## 2020-09-04 RX ORDER — FENTANYL CITRATE 50 UG/ML
25 INJECTION, SOLUTION INTRAMUSCULAR; INTRAVENOUS
Status: DISCONTINUED | OUTPATIENT
Start: 2020-09-04 | End: 2020-09-08

## 2020-09-04 RX ORDER — TALC
6 POWDER (GRAM) TOPICAL NIGHTLY PRN
Status: DISCONTINUED | OUTPATIENT
Start: 2020-09-04 | End: 2020-09-17

## 2020-09-04 RX ORDER — PROMETHAZINE HYDROCHLORIDE 12.5 MG/1
25 TABLET ORAL EVERY 6 HOURS PRN
Status: DISCONTINUED | OUTPATIENT
Start: 2020-09-04 | End: 2020-10-14 | Stop reason: HOSPADM

## 2020-09-04 RX ORDER — NOREPINEPHRINE BITARTRATE/D5W 4MG/250ML
0.02 PLASTIC BAG, INJECTION (ML) INTRAVENOUS CONTINUOUS
Status: DISCONTINUED | OUTPATIENT
Start: 2020-09-04 | End: 2020-09-04

## 2020-09-04 RX ORDER — PHENYLEPHRINE HCL IN 0.9% NACL 1 MG/10 ML
200 SYRINGE (ML) INTRAVENOUS ONCE
Status: COMPLETED | OUTPATIENT
Start: 2020-09-04 | End: 2020-09-04

## 2020-09-04 RX ORDER — POTASSIUM CHLORIDE 14.9 MG/ML
INJECTION INTRAVENOUS CONTINUOUS PRN
Status: DISCONTINUED | OUTPATIENT
Start: 2020-09-04 | End: 2020-09-04

## 2020-09-04 RX ORDER — GLUCAGON 1 MG
1 KIT INJECTION
Status: DISCONTINUED | OUTPATIENT
Start: 2020-09-04 | End: 2020-10-14 | Stop reason: HOSPADM

## 2020-09-04 RX ORDER — FENTANYL CITRATE 50 UG/ML
25 INJECTION, SOLUTION INTRAMUSCULAR; INTRAVENOUS ONCE
Status: COMPLETED | OUTPATIENT
Start: 2020-09-04 | End: 2020-09-04

## 2020-09-04 RX ORDER — VASOPRESSIN 20 [USP'U]/ML
INJECTION, SOLUTION INTRAMUSCULAR; SUBCUTANEOUS
Status: DISPENSED
Start: 2020-09-04 | End: 2020-09-04

## 2020-09-04 RX ORDER — LIDOCAINE HYDROCHLORIDE 10 MG/ML
INJECTION INFILTRATION; PERINEURAL
Status: DISPENSED
Start: 2020-09-04 | End: 2020-09-04

## 2020-09-04 RX ORDER — IBUPROFEN 200 MG
24 TABLET ORAL
Status: DISCONTINUED | OUTPATIENT
Start: 2020-09-04 | End: 2020-10-02

## 2020-09-04 RX ORDER — PANTOPRAZOLE SODIUM 40 MG/10ML
40 INJECTION, POWDER, LYOPHILIZED, FOR SOLUTION INTRAVENOUS DAILY
Status: DISCONTINUED | OUTPATIENT
Start: 2020-09-04 | End: 2020-09-10

## 2020-09-04 RX ORDER — HYDROCODONE BITARTRATE AND ACETAMINOPHEN 500; 5 MG/1; MG/1
TABLET ORAL
Status: DISCONTINUED | OUTPATIENT
Start: 2020-09-04 | End: 2020-09-05

## 2020-09-04 RX ORDER — POTASSIUM CHLORIDE 14.9 MG/ML
20 INJECTION INTRAVENOUS
Status: DISPENSED | OUTPATIENT
Start: 2020-09-04 | End: 2020-09-04

## 2020-09-04 RX ORDER — SODIUM CHLORIDE 0.9 % (FLUSH) 0.9 %
10 SYRINGE (ML) INJECTION
Status: DISCONTINUED | OUTPATIENT
Start: 2020-09-04 | End: 2020-10-14 | Stop reason: HOSPADM

## 2020-09-04 RX ORDER — INDOMETHACIN 25 MG/1
CAPSULE ORAL
Status: DISCONTINUED | OUTPATIENT
Start: 2020-09-04 | End: 2020-09-04

## 2020-09-04 RX ORDER — SODIUM CHLORIDE 0.9 % (FLUSH) 0.9 %
10 SYRINGE (ML) INJECTION
Status: DISCONTINUED | OUTPATIENT
Start: 2020-09-04 | End: 2020-09-28

## 2020-09-04 RX ORDER — MAGNESIUM SULFATE HEPTAHYDRATE 40 MG/ML
2 INJECTION, SOLUTION INTRAVENOUS
Status: COMPLETED | OUTPATIENT
Start: 2020-09-04 | End: 2020-09-04

## 2020-09-04 RX ORDER — FENTANYL CITRATE 50 UG/ML
INJECTION, SOLUTION INTRAMUSCULAR; INTRAVENOUS
Status: COMPLETED
Start: 2020-09-04 | End: 2020-09-04

## 2020-09-04 RX ORDER — ROCURONIUM BROMIDE 10 MG/ML
INJECTION, SOLUTION INTRAVENOUS
Status: COMPLETED
Start: 2020-09-04 | End: 2020-09-04

## 2020-09-04 RX ORDER — VENLAFAXINE HYDROCHLORIDE 37.5 MG/1
150 CAPSULE, EXTENDED RELEASE ORAL DAILY
Status: DISCONTINUED | OUTPATIENT
Start: 2020-09-04 | End: 2020-09-04

## 2020-09-04 RX ORDER — OXYCODONE HYDROCHLORIDE 5 MG/1
5 TABLET ORAL EVERY 6 HOURS PRN
Status: DISCONTINUED | OUTPATIENT
Start: 2020-09-04 | End: 2020-09-09

## 2020-09-04 RX ORDER — SODIUM CHLORIDE, SODIUM LACTATE, POTASSIUM CHLORIDE, CALCIUM CHLORIDE 600; 310; 30; 20 MG/100ML; MG/100ML; MG/100ML; MG/100ML
INJECTION, SOLUTION INTRAVENOUS CONTINUOUS
Status: DISCONTINUED | OUTPATIENT
Start: 2020-09-04 | End: 2020-09-06

## 2020-09-04 RX ORDER — LIDOCAINE HYDROCHLORIDE 10 MG/ML
1 INJECTION INFILTRATION; PERINEURAL ONCE
Status: COMPLETED | OUTPATIENT
Start: 2020-09-04 | End: 2020-09-04

## 2020-09-04 RX ORDER — FENTANYL CITRATE-0.9 % NACL/PF 10 MCG/ML
PLASTIC BAG, INJECTION (ML) INTRAVENOUS CONTINUOUS
Status: DISCONTINUED | OUTPATIENT
Start: 2020-09-04 | End: 2020-09-04

## 2020-09-04 RX ORDER — IBUPROFEN 200 MG
16 TABLET ORAL
Status: DISCONTINUED | OUTPATIENT
Start: 2020-09-04 | End: 2020-10-02

## 2020-09-04 RX ORDER — ETOMIDATE 2 MG/ML
INJECTION INTRAVENOUS
Status: COMPLETED
Start: 2020-09-04 | End: 2020-09-04

## 2020-09-04 RX ORDER — PROPOFOL 10 MG/ML
INJECTION, EMULSION INTRAVENOUS
Status: COMPLETED
Start: 2020-09-04 | End: 2020-09-04

## 2020-09-04 RX ORDER — ETOMIDATE 2 MG/ML
10 INJECTION INTRAVENOUS ONCE
Status: COMPLETED | OUTPATIENT
Start: 2020-09-04 | End: 2020-09-04

## 2020-09-04 RX ORDER — CALCIUM CHLORIDE INJECTION 100 MG/ML
INJECTION, SOLUTION INTRAVENOUS
Status: DISCONTINUED | OUTPATIENT
Start: 2020-09-04 | End: 2020-09-04

## 2020-09-04 RX ORDER — MIDAZOLAM HYDROCHLORIDE 1 MG/ML
INJECTION, SOLUTION INTRAMUSCULAR; INTRAVENOUS
Status: DISCONTINUED | OUTPATIENT
Start: 2020-09-04 | End: 2020-09-04

## 2020-09-04 RX ORDER — ONDANSETRON 2 MG/ML
4 INJECTION INTRAMUSCULAR; INTRAVENOUS EVERY 8 HOURS PRN
Status: DISCONTINUED | OUTPATIENT
Start: 2020-09-04 | End: 2020-10-14 | Stop reason: HOSPADM

## 2020-09-04 RX ADMIN — CALCIUM CHLORIDE 100 MG: 100 INJECTION, SOLUTION INTRAVENOUS at 12:09

## 2020-09-04 RX ADMIN — SODIUM BICARBONATE 50 MEQ: 84 INJECTION, SOLUTION INTRAVENOUS at 12:09

## 2020-09-04 RX ADMIN — PANTOPRAZOLE SODIUM 40 MG: 40 INJECTION, POWDER, LYOPHILIZED, FOR SOLUTION INTRAVENOUS at 08:09

## 2020-09-04 RX ADMIN — PROPOFOL 25 MCG/KG/MIN: 10 INJECTION, EMULSION INTRAVENOUS at 11:09

## 2020-09-04 RX ADMIN — VASOPRESSIN 0.04 UNITS/MIN: 20 INJECTION INTRAVENOUS at 08:09

## 2020-09-04 RX ADMIN — MAGNESIUM SULFATE IN WATER 2 G: 40 INJECTION, SOLUTION INTRAVENOUS at 02:09

## 2020-09-04 RX ADMIN — ETOMIDATE 10 MG: 2 INJECTION INTRAVENOUS at 11:09

## 2020-09-04 RX ADMIN — FENTANYL CITRATE 25 MCG: 50 INJECTION INTRAMUSCULAR; INTRAVENOUS at 09:09

## 2020-09-04 RX ADMIN — OXYCODONE HYDROCHLORIDE 5 MG: 5 TABLET ORAL at 02:09

## 2020-09-04 RX ADMIN — SODIUM CHLORIDE, SODIUM GLUCONATE, SODIUM ACETATE, POTASSIUM CHLORIDE, MAGNESIUM CHLORIDE, SODIUM PHOSPHATE, DIBASIC, AND POTASSIUM PHOSPHATE: .53; .5; .37; .037; .03; .012; .00082 INJECTION, SOLUTION INTRAVENOUS at 12:09

## 2020-09-04 RX ADMIN — CEFAZOLIN 2 G: 330 INJECTION, POWDER, FOR SOLUTION INTRAMUSCULAR; INTRAVENOUS at 12:09

## 2020-09-04 RX ADMIN — PIPERACILLIN SODIUM AND TAZOBACTAM SODIUM 4.5 G: 4; .5 INJECTION, POWDER, LYOPHILIZED, FOR SOLUTION INTRAVENOUS at 06:09

## 2020-09-04 RX ADMIN — PROPOFOL 5 MCG/KG/MIN: 10 INJECTION, EMULSION INTRAVENOUS at 11:09

## 2020-09-04 RX ADMIN — SODIUM CHLORIDE, SODIUM LACTATE, POTASSIUM CHLORIDE, AND CALCIUM CHLORIDE 3000 ML: .6; .31; .03; .02 INJECTION, SOLUTION INTRAVENOUS at 03:09

## 2020-09-04 RX ADMIN — Medication 200 MCG: at 12:09

## 2020-09-04 RX ADMIN — SODIUM CHLORIDE 1000 ML: 0.9 INJECTION, SOLUTION INTRAVENOUS at 02:09

## 2020-09-04 RX ADMIN — Medication 50 MCG: at 02:09

## 2020-09-04 RX ADMIN — ONDANSETRON 4 MG: 2 INJECTION INTRAMUSCULAR; INTRAVENOUS at 07:09

## 2020-09-04 RX ADMIN — VASOPRESSIN 0.04 UNITS/MIN: 20 INJECTION INTRAVENOUS at 07:09

## 2020-09-04 RX ADMIN — ETOMIDATE 10 MG: 40 INJECTION, SOLUTION INTRAVENOUS at 11:09

## 2020-09-04 RX ADMIN — FENTANYL CITRATE 25 MCG: 50 INJECTION INTRAMUSCULAR; INTRAVENOUS at 07:09

## 2020-09-04 RX ADMIN — ROCURONIUM BROMIDE 60 MG: 10 INJECTION, SOLUTION INTRAVENOUS at 11:09

## 2020-09-04 RX ADMIN — PIPERACILLIN SODIUM AND TAZOBACTAM SODIUM 4.5 G: 4; .5 INJECTION, POWDER, LYOPHILIZED, FOR SOLUTION INTRAVENOUS at 08:09

## 2020-09-04 RX ADMIN — MAGNESIUM SULFATE IN WATER 2 G: 40 INJECTION, SOLUTION INTRAVENOUS at 11:09

## 2020-09-04 RX ADMIN — ONDANSETRON 4 MG: 2 INJECTION INTRAMUSCULAR; INTRAVENOUS at 02:09

## 2020-09-04 RX ADMIN — MIDAZOLAM HYDROCHLORIDE 2 MG: 1 INJECTION, SOLUTION INTRAMUSCULAR; INTRAVENOUS at 12:09

## 2020-09-04 RX ADMIN — DEXTROSE MONOHYDRATE 12.5 G: 25 INJECTION, SOLUTION INTRAVENOUS at 06:09

## 2020-09-04 RX ADMIN — SODIUM CHLORIDE, SODIUM LACTATE, POTASSIUM CHLORIDE, AND CALCIUM CHLORIDE 1000 ML: .6; .31; .03; .02 INJECTION, SOLUTION INTRAVENOUS at 12:09

## 2020-09-04 RX ADMIN — SODIUM CHLORIDE, SODIUM LACTATE, POTASSIUM CHLORIDE, AND CALCIUM CHLORIDE 1000 ML: .6; .31; .03; .02 INJECTION, SOLUTION INTRAVENOUS at 08:09

## 2020-09-04 RX ADMIN — DEXTROSE MONOHYDRATE 25 G: 25 INJECTION, SOLUTION INTRAVENOUS at 07:09

## 2020-09-04 RX ADMIN — FENTANYL CITRATE 100 MCG: 50 INJECTION INTRAMUSCULAR; INTRAVENOUS at 07:09

## 2020-09-04 RX ADMIN — SODIUM CHLORIDE, SODIUM LACTATE, POTASSIUM CHLORIDE, AND CALCIUM CHLORIDE: .6; .31; .03; .02 INJECTION, SOLUTION INTRAVENOUS at 05:09

## 2020-09-04 RX ADMIN — POTASSIUM CHLORIDE: 14.9 INJECTION, SOLUTION INTRAVENOUS at 12:09

## 2020-09-04 RX ADMIN — SUGAMMADEX 200 MG: 100 INJECTION, SOLUTION INTRAVENOUS at 01:09

## 2020-09-04 RX ADMIN — NOREPINEPHRINE BITARTRATE 0.44 MCG/KG/MIN: 1 INJECTION, SOLUTION, CONCENTRATE INTRAVENOUS at 08:09

## 2020-09-04 RX ADMIN — LIDOCAINE HYDROCHLORIDE 1 ML: 10 INJECTION, SOLUTION INFILTRATION; PERINEURAL at 07:09

## 2020-09-04 NOTE — ASSESSMENT & PLAN NOTE
Patient is a 50-year-old female with history of liver transplant due to von Gierke disease, who presented to the hospital with abdominal pain and inability to tolerate p.o. intake.  CT scan suggestive gastric outlet obstruction and gastroesophageal junction thickening.  She underwent EGD on 09/03 that showed esophagitis with esophageal stricture with inability to pass the adult scope.   Unclear etiology of the patient's CT scan findings and esophageal stricture.  She has history of liver transplant with immunosuppression with a wide differential diagnosis of peptic ulcer disease versus infectious etiology.     Initial plan was to proceed with EGD, however the patient became hemodynamically unstable and currently on 2 vasopressors and appears clinically ill.  She is being evaluated by primary team for possible etiologies.    -please keep the patient NPO and continue IV PPI  -workup for hemodynamic instability per primary team (consideration for infectious versus cardiac causes) no signs of active bleeding  -defer EGD at this time until the patient is clinically stable

## 2020-09-04 NOTE — HPI
50 y.o. female that has a past medical history of Angiolipoma of kidney (10/1/2018), Arnold-Chiari malformation, Depression, Esophageal stricture, Essential tremor, Hypertension, Liver fibrosis, Osteoporosis, Recurrent urinary tract infection, Seizures, SIADH (syndrome of inappropriate ADH production), Squamous cell carcinoma (10/2014), and Von Gierke disease s/p liver transplant (2002, on tacro + MMF), HTN, and depression that presented as a transfer for GI evaluation from [facility] with complaints of diffuse abdominal pain over the course of 3 weeks. Underwent endoscopy that showed GE junction stricture, unable to pass an adult scope through stricture, no peds scope was available. Patient was transferred to [facility] for [facility] evaluation. On admission, she became acutely hypotensive. Repeat CT showed decompressed abdomen and evidence of free air. She underwent laparotomy earlier today and found to have a perforated stomach, greater curvature with diffuse contamination.  She underwent washout, repair of gastric perforation with omental patch, gastrostomy tube placement, left open with abthera in place.  She is on vasopressin.  She had an chloé placed in her right radial and per report this was a difficult placement.  It was noticed this afternoon that she had mottling of her right hand.  Signals could not be obtained but there was still a tracing on the line.  Vascular surgery consulted for this.     She is intubated and sedated and unable to give a full history and ROS.
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
## 2020-09-04 NOTE — HPI
Ms. Hernandez is a 51yo F w/PMH of von Gierke disease s/p liver transplant (2002, on tacro + MMF, follows Dr. Nielsen), hx chronic rejection (bx 2015 with acute and chronic rejection s/p steroids), biliary stricture and ductopenic rejection, recently with cirrhosis on fibroscan (10/2019), HTN, and depression who presents as a transfer for further evaluation of gastric outlet obstruction and esophageal stricturing.  Hepatology consult for immunosuppression management.    Patient is a poor historian-received fentanyl this morning.  She presented to [facility] with 3 weeks of mid/epigastric abdominal pain with associated nausea and vomiting.  She is on tacrolimus 2mg AM / 1mg PM and cellcept 500mg bid at home for immunosuppression.  CT abdo/pelvis showed gastric distension, gastric outlet obstruction, obstructive-related changes at pyloric-duodenal junction, and thickening of GE junction.  EGD on 9/3 which grade IV esophagitis with stricture, unable to pass gastroscope and no pediatric scope available so transferred here.    Labs initially with hypokalemia and mildly elevated AST with otherwise normal LFTs, lipase, CBC and INR.  COVID negative.  Admitted to hospital medicine here, but overnight became hypotensive and lethargic so transferred to MICU, started on vasopressin. Repeat labs with normal LFTs, JACQUE with Cr 2.2, lactate 3.9, BCx sent.

## 2020-09-04 NOTE — SUBJECTIVE & OBJECTIVE
Medications Prior to Admission   Medication Sig Dispense Refill Last Dose    mycophenolate (CELLCEPT) 250 mg Cap Take 2 capsules (500 mg total) by mouth 2 (two) times daily. 360 capsule 6     tacrolimus (PROGRAF) 1 MG Cap TAKE 2 CAPSULES BY MOUTH EVERY MORNING AND 1 CAPSULE EVERY EVENING 90 capsule 11     calcium carbonate (OS-JASON) 600 mg (1,500 mg) Tab Take 600 mg by mouth 2 (two) times daily with meals.       demeclocycline (DECLOMYCIN) 150 MG Tab Take 150 mg by mouth every 12 (twelve) hours.       DENAVIR 1 % cream RICHAR EXT AA Q 2 H FOR 4 DAYS  0     levothyroxine (SYNTHROID) 75 MCG tablet Take 75 mcg by mouth once daily.       lifitegrast (XIIDRA) 5 % Dpet Place 1 drop into both eyes 2 (two) times daily. (Patient not taking: Reported on 3/3/2020) 60 each 11     magnesium oxide (MAG-OX) 400 mg (241.3 mg magnesium) tablet TK 2 TS PO BID  5     multivitamin capsule Take 1 capsule by mouth once daily.       potassium chloride (KLOR-CON) 10 MEQ TbSR Take 4 tablets (40 mEq total) by mouth once daily. 120 tablet 3     promethazine (PHENERGAN) 25 MG tablet Take 25 mg by mouth every 4 (four) hours as needed (if unrelieved by zofran).   5     triamcinolone acetonide 0.1% (KENALOG) 0.1 % cream Apply topically 2 (two) times daily. (Patient not taking: Reported on 3/3/2020) 454 g 1        Review of patient's allergies indicates:   Allergen Reactions    Codeine Itching     Other reaction(s): Itching    Lipitor [atorvastatin] Other (See Comments)     Other reaction(s): Muscle pain  Muscle cranmps    Morphine Itching     Other reaction(s): nausea and vomiting     Zoloft [sertraline] Other (See Comments)     Tremors/muscle spasms       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    Arnold-Chiari malformation     Depression     Esophageal stricture     Essential tremor     Hypertension     Left bundle branch block     Liver fibrosis, transplanted liver 10/2/2018    Suggested on  fibroscan 10/2/18    Migraine without aura     MVP (mitral valve prolapse)     Non-rheumatic mitral regurgitation 10/1/2018    Non-rheumatic tricuspid valve insufficiency 10/1/2018    Osteoporosis     Recurrent urinary tract infection     Seizures     Shingles 2007    SIADH (syndrome of inappropriate ADH production)     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    Tricuspid valve prolapse     Urolithiasis     Von Gierke disease     s/p liver transplant     Past Surgical History:   Procedure Laterality Date    APPENDECTOMY  6/22/2007    COLONOSCOPY  5/13/2008    internal hemorrhoids    CRANIOTOMY      ESOPHAGOGASTRODUODENOSCOPY N/A 9/3/2020    Procedure: EGD (ESOPHAGOGASTRODUODENOSCOPY);  Surgeon: Tyrel Vergara MD;  [facility];  Service: Endoscopy;  Laterality: N/A;    LAMINECTOMY  3/2001    LIVER TRANSPLANT  9/23/2002    OSSICULAR RECONSTRUCTION  10/4/1995    RIGHT REPLACEMENT PROSTHESIS for cholesteatoma    THYMECTOMY  5/2/2007    TONSILLECTOMY, ADENOIDECTOMY  1/21/2004    TOTAL ABDOMINAL HYSTERECTOMY  3/31/1994     Family History     None        Tobacco Use    Smoking status: Never Smoker   Substance and Sexual Activity    Alcohol use: No    Drug use: No    Sexual activity: Not on file     Review of Systems   Unable to perform ROS: Intubated     Objective:     Vital Signs (Most Recent):  Temp: 98.6 °F (37 °C) (09/04/20 1600)  Pulse: (!) 116 (09/04/20 1815)  Resp: (!) 24 (09/04/20 1815)  BP: 138/67 (09/04/20 1730)  SpO2: 96 % (09/04/20 1815) Vital Signs (24h Range):  Temp:  [97.7 °F (36.5 °C)-98.6 °F (37 °C)] 98.6 °F (37 °C)  Pulse:  [] 116  Resp:  [4-34] 24  SpO2:  [93 %-100 %] 96 %  BP: ()/() 138/67  Arterial Line BP: ()/(53-93) 138/53     Weight: 55.8 kg (123 lb)  Body mass index is 24.84 kg/m².    Physical Exam  Constitutional:       Appearance: She is toxic-appearing.      Comments: Intubated, sedated   HENT:      Head: Normocephalic.       Nose: Nose normal.      Mouth/Throat:      Mouth: Mucous membranes are moist.   Eyes:      Pupils: Pupils are equal, round, and reactive to light.   Neck:      Comments: Support lines in place  Cardiovascular:      Rate and Rhythm: Tachycardia present.      Comments: Palpable femoral pulses bilaterally.  Unable to obtain radial or ulnar signals in either hand.  Bedside ultrasound shows short segment occlusion of the radial artery just proximal to a-line.  Ulnar is diminutive and spasmed but there is flow.  Triphasic doppler signal in radial proximal to occlusion, triphasic signal in brachial artery on right.  Thre is flow in the left hand as well on color dopler ultrasound but no doppler signal.  There is brachial signal.  Pulmonary:      Comments: Intubated and sedated  Abdominal:      Comments: Abthera in place   Musculoskeletal:      Comments: Mottling mostly in RUE up to shoulder, but also on to breast.  LUE there is mottling of the hand.  There is mottling of the abdomen and thighs as well.  Over all appears quite pale   Skin:     Capillary Refill: Capillary refill takes 2 to 3 seconds.      Comments: Cool, clamy, capillary refill bilaterally         Significant Labs:  BMP:   Recent Labs   Lab 09/04/20  0734 09/04/20  0929   * 114*   * 133*   K 3.8 3.2*    111*   CO2 12* 16*   BUN 34* 32*   CREATININE 2.6* 2.1*   CALCIUM 6.8* 6.9*   MG 1.1*  --      CMP:   Recent Labs   Lab 09/04/20  0929   *   CALCIUM 6.9*   ALBUMIN 1.5*   PROT 3.8*   *   K 3.2*   CO2 16*   *   BUN 32*   CREATININE 2.1*   ALKPHOS 146*   ALT 30   AST 56*   BILITOT 0.7     Lactic Acid:   Recent Labs   Lab 09/04/20  1335   LACTATE 4.1*       Significant Diagnostics:  No vascular imaging yet
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
## 2020-09-04 NOTE — SUBJECTIVE & OBJECTIVE
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    Arnold-Chiari malformation     Depression     Esophageal stricture     Essential tremor     Hypertension     Left bundle branch block     Liver fibrosis, transplanted liver 10/2/2018    Suggested on fibroscan 10/2/18    Migraine without aura     MVP (mitral valve prolapse)     Non-rheumatic mitral regurgitation 10/1/2018    Non-rheumatic tricuspid valve insufficiency 10/1/2018    Osteoporosis     Recurrent urinary tract infection     Seizures     Shingles 2007    SIADH (syndrome of inappropriate ADH production)     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    Tricuspid valve prolapse     Urolithiasis     Von Gierke disease     s/p liver transplant       Past Surgical History:   Procedure Laterality Date    APPENDECTOMY  6/22/2007    COLONOSCOPY  5/13/2008    internal hemorrhoids    CRANIOTOMY      LAMINECTOMY  3/2001    LIVER TRANSPLANT  9/23/2002    OSSICULAR RECONSTRUCTION  10/4/1995    RIGHT REPLACEMENT PROSTHESIS for cholesteatoma    THYMECTOMY  5/2/2007    TONSILLECTOMY, ADENOIDECTOMY  1/21/2004    TOTAL ABDOMINAL HYSTERECTOMY  3/31/1994       Review of patient's allergies indicates:   Allergen Reactions    Codeine Itching     Other reaction(s): Itching    Lipitor [atorvastatin] Other (See Comments)     Other reaction(s): Muscle pain  Muscle cranmps    Morphine Itching     Other reaction(s): nausea and vomiting     Zoloft [sertraline] Other (See Comments)     Tremors/muscle spasms       Current Facility-Administered Medications on File Prior to Encounter   Medication    [COMPLETED] 0.9%  NaCl infusion    [COMPLETED] iohexoL (OMNIPAQUE 350) injection 80 mL    [COMPLETED] lorazepam injection 1 mg    [COMPLETED] metoclopramide HCl injection 10 mg    [COMPLETED] morphine injection 4 mg    [COMPLETED] ondansetron injection 4 mg    [COMPLETED] potassium chloride 10 mEq in 100 mL IVPB    [COMPLETED]  sodium chloride 0.9% bolus 1,000 mL    [DISCONTINUED] 0.9 % NaCl with KCl 20 mEq infusion    [DISCONTINUED] acetaminophen tablet 650 mg    [DISCONTINUED] amitriptyline tablet 75 mg    [DISCONTINUED] clonazePAM tablet 0.5 mg    [DISCONTINUED] gabapentin capsule 300 mg    [DISCONTINUED] HYDROmorphone injection 0.5 mg    [DISCONTINUED] hydrOXYzine HCL tablet 25 mg    [DISCONTINUED] labetalol 20 mg/4 mL (5 mg/mL) IV syring    [DISCONTINUED] lactated ringers infusion    [DISCONTINUED] levothyroxine tablet 75 mcg    [DISCONTINUED] lisinopriL tablet 40 mg    [DISCONTINUED] melatonin tablet 6 mg    [DISCONTINUED] mycophenolate capsule 500 mg    [DISCONTINUED] ondansetron injection 4 mg    [DISCONTINUED] ondansetron injection    [DISCONTINUED] pantoprazole EC tablet 40 mg    [DISCONTINUED] pantoprazole injection 40 mg    [DISCONTINUED] prochlorperazine injection Soln 5 mg    [DISCONTINUED] propofol (DIPRIVAN) 10 mg/mL infusion    [DISCONTINUED] sodium chloride 0.9% flush 10 mL    [DISCONTINUED] tacrolimus capsule 1 mg    [DISCONTINUED] tacrolimus capsule 2 mg    [DISCONTINUED] venlafaxine 24 hr capsule 150 mg     Current Outpatient Medications on File Prior to Encounter   Medication Sig    acyclovir (ZOVIRAX) 400 MG tablet TK 1 T PO FID FOR 5 DAYS    amitriptyline (ELAVIL) 75 MG tablet TK 1 T PO  QD    butalbital-acetaminophen  mg Tab TK 1 T PO Q 4 H PRN. NTE 6 H    calcium carbonate (OS-JASON) 600 mg (1,500 mg) Tab Take 600 mg by mouth 2 (two) times daily with meals.    clonazePAM (KLONOPIN) 0.5 MG tablet Take 0.5 mg by mouth 2 (two) times daily as needed for Anxiety.    demeclocycline (DECLOMYCIN) 150 MG Tab Take 150 mg by mouth every 12 (twelve) hours.    DENAVIR 1 % cream RICHAR EXT AA Q 2 H FOR 4 DAYS    ergocalciferol (ERGOCALCIFEROL) 50,000 unit Cap Take 50,000 Units by mouth every 7 days.    gabapentin (NEURONTIN) 300 MG capsule Take 300 mg by mouth 3 (three) times daily.     hydrOXYzine HCl (ATARAX) 25 MG tablet Take 1 tablet (25 mg total) by mouth every 8 (eight) hours as needed for Itching.    lactulose (CHRONULAC) 10 gram/15 mL solution TAKE 20 ML BY MOUTH TWICE DAILY (Patient not taking: Reported on 3/3/2020)    levothyroxine (SYNTHROID) 75 MCG tablet Take 75 mcg by mouth once daily.    lifitegrast (XIIDRA) 5 % Dpet Place 1 drop into both eyes 2 (two) times daily. (Patient not taking: Reported on 3/3/2020)    lisinopril (PRINIVIL,ZESTRIL) 40 MG tablet TK 1 T PO QD    magnesium oxide (MAG-OX) 400 mg (241.3 mg magnesium) tablet TK 2 TS PO BID    multivitamin capsule Take 1 capsule by mouth once daily.    mycophenolate (CELLCEPT) 250 mg Cap Take 2 capsules (500 mg total) by mouth 2 (two) times daily.    mycophenolate (CELLCEPT) 250 mg Cap Take 2 capsules (500 mg total) by mouth 2 (two) times daily.    ondansetron (ZOFRAN) 8 MG tablet TAKE 1 TABLET(8 MG) BY MOUTH EVERY 8 HOURS AS NEEDED FOR NAUSEA    pantoprazole (PROTONIX) 40 MG tablet Take 1 tablet (40 mg total) by mouth 2 (two) times daily.    potassium chloride (KLOR-CON) 10 MEQ TbSR Take 4 tablets (40 mEq total) by mouth once daily.    promethazine (PHENERGAN) 25 MG tablet Take 25 mg by mouth every 4 (four) hours as needed (if unrelieved by zofran).     saliva stimulant agents comb.3 (BIOTENE MOISTURIZING MOUTH) Spry by Mucous Membrane route daily as needed.    tacrolimus (PROGRAF) 1 MG Cap TAKE 2 CAPSULES BY MOUTH EVERY MORNING AND 1 CAPSULE EVERY EVENING    triamcinolone acetonide 0.1% (KENALOG) 0.1 % cream Apply topically 2 (two) times daily. (Patient not taking: Reported on 3/3/2020)    venlafaxine (EFFEXOR-XR) 150 MG Cp24 TK 1 C PO QD WF     Family History     None        Tobacco Use    Smoking status: Never Smoker   Substance and Sexual Activity    Alcohol use: No    Drug use: No    Sexual activity: Not on file     Review of Systems   Constitutional: Positive for chills. Negative for fever.   HENT:  Negative for congestion, sore throat and trouble swallowing.    Eyes: Negative for visual disturbance.   Respiratory: Negative for cough, shortness of breath and wheezing.    Cardiovascular: Negative for chest pain, palpitations and leg swelling.   Gastrointestinal: Positive for abdominal pain, constipation, nausea and vomiting. Negative for blood in stool and diarrhea.   Genitourinary: Negative for dysuria and hematuria.   Musculoskeletal: Negative for arthralgias and myalgias.   Skin: Negative for rash.   Neurological: Positive for dizziness and light-headedness. Negative for numbness and headaches.   Psychiatric/Behavioral: Negative for agitation and confusion.     Objective:     Vital Signs (Most Recent):  Temp: 98 °F (36.7 °C) (09/04/20 0055)  Pulse: (!) 116 (09/04/20 0055)  Resp: 20 (09/04/20 0055)  BP: (!) 96/57 (09/04/20 0055)  SpO2: 97 % (09/04/20 0055) Vital Signs (24h Range):  Temp:  [97.7 °F (36.5 °C)-100.3 °F (37.9 °C)] 98 °F (36.7 °C)  Pulse:  [] 116  Resp:  [16-20] 20  SpO2:  [90 %-99 %] 97 %  BP: ()/() 96/57        There is no height or weight on file to calculate BMI.    Physical Exam  Constitutional:       General: She is not in acute distress.     Appearance: She is well-developed. She is ill-appearing.   HENT:      Head: Normocephalic and atraumatic.      Mouth/Throat:      Pharynx: No oropharyngeal exudate.   Eyes:      Conjunctiva/sclera: Conjunctivae normal.      Pupils: Pupils are equal, round, and reactive to light.   Neck:      Musculoskeletal: Normal range of motion and neck supple.   Cardiovascular:      Rate and Rhythm: Normal rate and regular rhythm.      Heart sounds: Normal heart sounds. No murmur.   Pulmonary:      Effort: Pulmonary effort is normal. No respiratory distress.      Breath sounds: Normal breath sounds. No wheezing or rales.   Abdominal:      General: Bowel sounds are normal. There is no distension.      Palpations: Abdomen is soft.      Tenderness:  There is abdominal tenderness (significant epigastric + mild generalized). There is no guarding.   Musculoskeletal:         General: No tenderness.   Skin:     General: Skin is warm and dry.      Findings: No rash.   Neurological:      Mental Status: She is alert and oriented to person, place, and time.      Cranial Nerves: No cranial nerve deficit.      Motor: No abnormal muscle tone.   Psychiatric:         Behavior: Behavior normal.         Significant Labs:   CBC:   Recent Labs   Lab 09/03/20 0347 09/03/20  2149   WBC 11.03  --    HGB 13.5  --    HCT 41.1 44.5  44.5     --      CMP:   Recent Labs   Lab 09/03/20 0347 09/03/20  1605    137   K 2.9* 3.4*   CL 93* 100   CO2 35* 27    133*   BUN 24* 25*   CREATININE 0.89 0.94   CALCIUM 9.9 8.6   PROT 7.5  --    ALBUMIN 4.1  --    BILITOT 1.1  --    ALKPHOS 333*  --    AST 43*  --    ALT 29  --    ANIONGAP 10 10   EGFRNONAA >60 >60     All pertinent labs within the past 24 hours have been reviewed.    Significant Imaging: I have reviewed and interpreted all pertinent imaging results/findings within the past 24 hours.

## 2020-09-04 NOTE — HPI
Ms. Hernandez is a 49 yo F with von Gierke disease s/p liver transplant (2002, on tacro + MMF), HTN, and depression that presents as transfer for GI evaluation.     Patient initially presented to [facility] with diffuse abdominal pain that progressively worsened over the course of 3 weeks. Associated with nausea, vomiting, and decreased PO intake. Pain worsened with food, decreased in intensity with smaller meals. Initial labs notable for hypoK. UA showed 3+ protein. CT abdo/pelvis showed gastric distension, gastric outlet narrowing, obstructive-related changes at pyloric-duodenal junction, and thickening of GE junction. Patient was admitted to  for further evaluation. She underwent EGD on 9/3 which grade IV esophagitis with stricture. GI unable to pass adult scope through stricture, no peds scope was available. [place] [facility] [place]

## 2020-09-04 NOTE — H&P
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 activities: development of treatment plan with patient or surrogate and bedside caregivers, discussions with consultants, evaluation of patient's response to treatment, examination of patient, ordering and performing treatments and interventions, ordering and review of laboratory studies, ordering and review of radiographic studies, pulse oximetry, re-evaluation of patient's condition. This critical care time did not overlap with that of any other provider or involve time for any procedures.     Efra Tinajero MD  Critical Care Medicine  [facility]
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
## 2020-09-04 NOTE — PT/OT/SLP PROGRESS
Physical Therapy      Patient Name:  Elda Hernandez   MRN:  [id number]    Patient transferred to ICU secondary to lethargy and low blood pressure. As patient is with a medical change in status will discontinue PT orders.  Please reorder if a new PT need arises.  Thank you.    Gallo Lopez, PT

## 2020-09-04 NOTE — PLAN OF CARE
POC reviewed. AAOx4 for much of the shift with some disorientation when woken during patient care toward the end of shift. Hypotensive and tachycardic upon arrival as direct admit from [facility] at midnight. Bolus upon arrival. Unable to obtain a BP around 0430 hrs, informed MD, called rapid. Upon assessment patient was lethargic, confused, diaphoretic, apical pulse 120's, radial pulse 40's. Obtained new IV access, pads applied to patient. Ryan administered per rapid nurses. Obtained labs, ordered Xray. Obtained blood glucose, EKG, and hung a second 1,000ml bolus. Transferred to SICU, report given at bedside.

## 2020-09-04 NOTE — PLAN OF CARE
CM obtained discharge planning assessment at bedside from spouse.  No discharge needs are noted at this time.        Jordana Woods MD  [address] [facility] [address]      [id number] - [place], [place] - [address]  [address]  [facility] [address]  Phone: [phone] Fax: [phone]    [id number] - [place], [place] - [address] [facility] [address]  [address]  [facility] [address]  Phone: [phone] Fax: [phone]    Payor: BCBS [place] MEDICARE / Plan: BCBS OF [facility] ADVANTAGE / Product Type: Medicare Advantage /     Extended Emergency Contact Information  Primary Emergency Contact: Kody Hernandez  Address: [address]  Home Phone: [phone]  Mobile Phone: [phone]  Relation: Spouse    Future Appointments   Date Time Provider Department Center   9/4/2020  1:00 PM [facility] ECHOSTR [facility]        09/04/20 1015   Discharge Assessment   Assessment Type Discharge Planning Assessment   Confirmed/corrected address and phone number on facesheet? Yes   Assessment information obtained from? Caregiver;Medical Record   Communicated expected length of stay with patient/caregiver yes   Prior to hospitilization cognitive status: Alert/Oriented   Prior to hospitalization functional status: Independent   Current cognitive status: Coma/Sedated/Intubated   Current Functional Status: Completely Dependent   Facility Arrived From: [facility]   Lives With spouse   Able to Return to Prior Arrangements other (see comments)  (TBD)   Is patient able to care for self after discharge? Unable to determine at this time (comments)   Who are your caregiver(s) and their phone number(s)? Kody Hernandez (spouse)- [phone]   Readmission Within the Last 30 Days other (see comments)  (transfer from [facility])   Patient  currently being followed by outpatient case management? No   Patient currently receives any other outside agency services? No   Equipment Currently Used at Home none   Do you have any problems affording any of your prescribed medications? No   Is the patient taking medications as prescribed? yes   Does the patient have transportation home? Yes   Transportation Anticipated family or friend will provide   Dialysis Name and Scheduled days N/A   Does the patient receive services at the Coumadin Clinic? No   Discharge Plan A Other  (TBD: patient's discharge disposition will depend on prognosis)   DME Needed Upon Discharge  other (see comments)  (TBD)   Patient/Family in Agreement with Plan yes   Does the patient have family/friends to help with healtcare needs after discharge? yes   Does the patient have transportation to healthcare appointments? Yes   Readmission Questionnaire   Living Arrangements house       Susana Pringle MPH, RN, CM  Ext. [phone]

## 2020-09-04 NOTE — PLAN OF CARE
I was called to evaluate ECG on Ms. Hernandez after machine read STEMI and the patient was complaining of vague abd/chest pain.    Briefly, she is admitted for gastric outlet obstruction and has been transferred to the ICU this morning due to shock. She is lucid on exam and able to give a history. She tells me she has severe abdominal pain and lower chest wall/upper epigastric pain. Her pain is reproducible with palpation. She denies any radiation down her left arm or into her jaw.     ECG demonstrated LBBB which is similar to her previous ECG. She does have ST depressions in AVL and V2 that is less than 5mm and does not meet STEMI criteria. Additionally, she does not have anginal pain. Defer management to the primary/critical care team at this time and will not take her to the cath lab. Please call back if any questions arise.

## 2020-09-04 NOTE — EICU
Intervention Initiated From:  Bedside   Called into room for emergent intubation. Dr Alcala and Dr Banuelos at bedside. /76, , SP02 98%  11:19 10mg etomidate IVP followed by 60mg rocuronium  11:23 ETT in place with BBS, positive color change, hypotensive,   100mcg hayes IVP and 1L LR   11:25 BP 83/72, , SP02 94%  11:26 /100

## 2020-09-04 NOTE — SUBJECTIVE & OBJECTIVE
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    Arnold-Chiari malformation     Depression     Esophageal stricture     Essential tremor     Hypertension     Left bundle branch block     Liver fibrosis, transplanted liver 10/2/2018    Suggested on fibroscan 10/2/18    Migraine without aura     MVP (mitral valve prolapse)     Non-rheumatic mitral regurgitation 10/1/2018    Non-rheumatic tricuspid valve insufficiency 10/1/2018    Osteoporosis     Recurrent urinary tract infection     Seizures     Shingles 2007    SIADH (syndrome of inappropriate ADH production)     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    Tricuspid valve prolapse     Urolithiasis     Von Gierke disease     s/p liver transplant       Past Surgical History:   Procedure Laterality Date    APPENDECTOMY  6/22/2007    COLONOSCOPY  5/13/2008    internal hemorrhoids    CRANIOTOMY      LAMINECTOMY  3/2001    LIVER TRANSPLANT  9/23/2002    OSSICULAR RECONSTRUCTION  10/4/1995    RIGHT REPLACEMENT PROSTHESIS for cholesteatoma    THYMECTOMY  5/2/2007    TONSILLECTOMY, ADENOIDECTOMY  1/21/2004    TOTAL ABDOMINAL HYSTERECTOMY  3/31/1994       Review of patient's allergies indicates:   Allergen Reactions    Codeine Itching     Other reaction(s): Itching    Lipitor [atorvastatin] Other (See Comments)     Other reaction(s): Muscle pain  Muscle cranmps    Morphine Itching     Other reaction(s): nausea and vomiting     Zoloft [sertraline] Other (See Comments)     Tremors/muscle spasms     Family History     None        Tobacco Use    Smoking status: Never Smoker   Substance and Sexual Activity    Alcohol use: No    Drug use: No    Sexual activity: Not on file     Review of Systems   Constitutional: Positive for appetite change and fatigue. Negative for activity change, chills and fever.   HENT: Positive for trouble swallowing.    Respiratory: Negative for cough, choking, chest tightness and shortness  of breath.    Cardiovascular: Negative for chest pain and leg swelling.   Gastrointestinal: Positive for abdominal pain, nausea and vomiting. Negative for abdominal distention, constipation, diarrhea and rectal pain.   Genitourinary: Negative for difficulty urinating and dysuria.   Musculoskeletal: Negative for arthralgias and back pain.   Skin: Negative for color change and pallor.   Neurological: Positive for dizziness. Negative for headaches.   Psychiatric/Behavioral: Negative for agitation and confusion.     Objective:     Vital Signs (Most Recent):  Temp: 98.2 °F (36.8 °C) (09/04/20 0509)  Pulse: (!) 126 (09/04/20 0509)  Resp: 18 (09/04/20 0509)  BP: (!) 96/57 (09/04/20 0055)  SpO2: (!) 94 % (09/04/20 0509) Vital Signs (24h Range):  Temp:  [97.7 °F (36.5 °C)-98.7 °F (37.1 °C)] 98.2 °F (36.8 °C)  Pulse:  [] 126  Resp:  [16-20] 18  SpO2:  [90 %-97 %] 94 %  BP: ()/() 96/57        There is no height or weight on file to calculate BMI.    No intake or output data in the 24 hours ending 09/04/20 0634    Lines/Drains/Airways     Airway                 Airway - Non-Surgical 09/03/20 1110 Nasal Cannula less than 1 day          Peripheral Intravenous Line                 Peripheral IV - Single Lumen Right Forearm -- days         Peripheral IV - Single Lumen 09/03/20 0400 20 G Left Antecubital 1 day                Physical Exam  Constitutional:       General: She is not in acute distress.     Appearance: She is well-developed.   HENT:      Head: Normocephalic.   Eyes:      General: No scleral icterus.     Conjunctiva/sclera: Conjunctivae normal.   Neck:      Musculoskeletal: Normal range of motion and neck supple.   Cardiovascular:      Rate and Rhythm: Regular rhythm. Tachycardia present.   Pulmonary:      Effort: Pulmonary effort is normal.      Breath sounds: Normal breath sounds.   Abdominal:      General: Bowel sounds are normal. There is no distension.      Palpations: Abdomen is soft. There is no  mass.      Tenderness: There is abdominal tenderness (epigastric). There is no guarding or rebound.   Musculoskeletal: Normal range of motion.   Skin:     General: Skin is warm and dry.   Neurological:      Mental Status: She is alert and oriented to person, place, and time.         Significant Labs:  CBC:   Recent Labs   Lab 09/03/20  0347 09/03/20  2149 09/04/20  0255 09/04/20  0500   WBC 11.03  --  4.62 6.47   HGB 13.5  --  15.0 14.3   HCT 41.1 44.5  44.5 47.2 44.7     --  388* 371*     BMP:   Recent Labs   Lab 09/04/20  0254   *   *   K 3.5      CO2 17*   BUN 33*   CREATININE 2.2*   CALCIUM 8.3*   MG 1.3*     CMP:   Recent Labs   Lab 09/04/20  0254   *   CALCIUM 8.3*   ALBUMIN 2.5*   PROT 6.1   *   K 3.5   CO2 17*      BUN 33*   CREATININE 2.2*   ALKPHOS 259*   ALT 15   AST 26   BILITOT 0.9     Coagulation: No results for input(s): PT, INR, APTT in the last 48 hours.    Significant Imaging:  Imaging results within the past 24 hours have been reviewed.

## 2020-09-04 NOTE — PROCEDURES
"Elda Hernandez is a 50 y.o. female patient.    Temp: 98.2 °F (36.8 °C) (09/04/20 0509)  Pulse: 105 (09/04/20 1415)  Resp: 20 (09/04/20 1130)  BP: (!) 96/57 (09/04/20 1415)  SpO2: (!) 93 % (09/04/20 1130)  Weight: 55.8 kg (123 lb) (09/04/20 1415)  Height: 4' 11" (149.9 cm) (09/04/20 1415)       Intubation    Date/Time: 9/4/2020 3:09 PM  Location procedure was performed: [facility] SURGICAL ICU (SICU)  Performed by: Paola Alcala MD  Authorized by: Paola Alcala MD   Assisting provider: Vinny Banuelos MD  Pre-operative diagnosis: Altered mental status   Post-operative diagnosis: Altered mental status  Consent Done: Yes  Consent: Verbal consent obtained.  Intubation method: video-assisted  Preoxygenation: bag valve mask  Sedatives: etomidate  Paralytic: rocuronium  Laryngoscope size: Glide 3  Tube size: 7.0 mm  Tube type: cuffed  Number of attempts: 1  Cords visualized: yes  Post-procedure assessment: CO2 detector  Breath sounds: equal  Cuff inflated: yes  ETT to lip: 24 cm  Tube secured with: ETT de la cruz       Dr Banuelos at bedside for procedure      Paola Alcala MD  [employer]  09/04/2020  3:12 PM    "

## 2020-09-04 NOTE — ASSESSMENT & PLAN NOTE
On tacro + MMF  LFTs stable at OSH    Plan:  - Continue tacro  - Hold MMF  - Hepatology consulted, recs appreciated  - CMPs daily  - Avoid hepatotoxic agents

## 2020-09-04 NOTE — HPI
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    Patient presented to [facility] on 09/04 for evaluation of abdominal pain.  Was reported as diffuse abdominal with nausea and vomiting for the month prior to presentation.  Symptoms were progressively getting worse associated with dysphagia and inability tolerate any liquids or solids with subsequent vomiting.  She had been losing weight as well.  Denies fevers or chills or change in bowel.    On presentation to the ED she was hemodynamically stable, labs with normal white cell count, alkaline phosphatase 303, AST/ALT 43/29.  CT scan of the abdomen reported with stomach significantly filled with fluid and distended suggestive of gastric outlet obstruction.  Thickening at the GE junction was also seen.  An EGD was done on 09/03 and showed severe esophagitis with narrowing at the gastroesophageal junction with inability to pass adult scope.  Pediatric scope was not available.  She was transferred for evaluation and management.  Of note the patient underwent an ERCP here in 2015 for evaluation of elevated LFTs with stent placement.  At that time the forward viewing gastroscope showed esophagitis.

## 2020-09-04 NOTE — PROCEDURES
Elda Hernandez is a 50 y.o. female patient.    Temp: 98.2 °F (36.8 °C) (09/04/20 0509)  Pulse: (!) 126 (09/04/20 0509)  Resp: 18 (09/04/20 0509)  BP: (!) 96/57 (09/04/20 0055)  SpO2: (!) 94 % (09/04/20 0509)       Central Line    Date/Time: 9/4/2020 7:30 AM  Performed by: Abena Beth NP  Authorized by: Abena Beth NP     Location procedure was performed:  [facility] SURGICAL ICU (SICU)  Pre-operative diagnosis:  Shock, undifferentiated  Post-operative diagnosis:  Shock, undifferentiated  Consent Done ?:  Yes  Time out complete?: Verified correct patient, procedure, equipment, staff, and site/side    Indications:  Vascular access and med administration  Anesthesia:  Local infiltration  Local anesthetic:  Lidocaine 1% without epinephrine  Anesthetic total (ml):  2  Preparation:  Skin prepped with ChloraPrep  Skin prep agent dried: Skin prep agent completely dried prior to procedure    Sterile barriers: All five maximal sterile barriers used - gloves, gown, cap, mask and large sterile sheet    Hand hygiene: Hand hygiene performed immediately prior to central venous catheter insertion    Location:  Right subclavian  Catheter type:  Triple lumen  Catheter size:  7 Fr  Inserted Catheter Length (cm):  16  Ultrasound guidance: Yes    Vessel Caliber:  Large   patent  Comprressibility:  Normal  Doppler:  Not done  Needle advanced into vessel with real time ultrasound guidance.    Guidewire confirmed in vessel.    Steril sheath on probe.    Manometry: Yes    Number of attempts:  1  Securement:  Line sutured, chlorhexidine patch, sterile dressing applied and blood return through all ports  Technical Procedures Used:  Micro puncture kit  Complications: No    Estimated blood loss (mL):  2  Specimens: No    Implants: No    XRay:  Placement verified by x-ray, no pneumothorax on x-ray and successful placement  Adverse Events:  None        Abena Beth  9/4/2020

## 2020-09-04 NOTE — CODE/ RAPID DOCUMENTATION
RAPID RESPONSE NURSE NOTE     Admit Date: 9/3/2020  LOS: 1  Code Status: Full Code   Date of Consult: 2020  : 1970  Age: 50 y.o.  Weight:   Wt Readings from Last 1 Encounters:   20 55.8 kg (123 lb)     Sex: female  Race: White   Bed: 36395/54932 A:   MRN: [id number]  Time Rapid Response Team call Received from bedside RN: 0610  Time Rapid Response Team at Bedside: 0613  Time Rapid Response Team left Bedside: 0700  Was the patient discharged from an ICU this admission?   no  Was the patient discharged from a PACU within last 24 hours?  no  Did the patient receive conscious sedation/general anesthesia within last 24 hours?  no  Was the patient in the ED within the past 24 hours?  no  Was the patient started on NIPPV within the past 24 hours?  no  Did this progress into an ARC or CPA:  no  Attending Physician: Valentina Melissa MD  Primary Service: Networked reference to record PCT   Consult Requested By: Valentina Melissa MD     SITUATION     Notified by bedside RN via phone call.  Reason for alert: Hypotension - unable to obtain NIBP  Called to evaluate the patient for Circulatory    BACKGROUND     Why is the patient in the hospital?: Gastric outlet obstruction    Patient has a past medical history of Angiolipoma of kidney, Arnold-Chiari malformation, Depression, Esophageal stricture, Essential tremor, Hypertension, Left bundle branch block, Liver fibrosis, transplanted liver, Migraine without aura, MVP (mitral valve prolapse), Non-rheumatic mitral regurgitation, Non-rheumatic tricuspid valve insufficiency, Osteoporosis, Recurrent urinary tract infection, Seizures, Shingles, SIADH (syndrome of inappropriate ADH production), Squamous cell carcinoma, Tricuspid valve prolapse, Urolithiasis, and Von Gierke disease.    ASSESSMENT/INTERVENTIONS     Doppler BP 62/0; HR 120s, SpO2 99%.  Lethargic, diaphoretic.  [facility] called and Abena Beth NP at bedside. Oriented to person and place.   22G pIV left hand obtained.   1L NS initiated.  Abdomen distended and patient guarding.  Complaining of chest pain when breathing.  ABG attempted.  12-lead EKG obtained.  500mcg hayes given.  Doppler BP 70/0.  Levo gtt initiated.  Transferred to ICU for CCM management.    RECOMMENDATIONS     We recommend: Upgrade to ICU.    FOLLOW-UP/CONTINGENCY PLAN     Call the Rapid Response Nurse, Carol Casanova RN at x [phone] for additional questions or concerns.    PHYSICIAN ESCALATION     Orders received and case discussed with Abena Beth NP.    Disposition: Tx in ICU bed 75064.

## 2020-09-04 NOTE — PT/OT/SLP DISCHARGE
Occupational Therapy Discharge Summary    Elda Hernandez  MRN: [id number]   Principal Problem: Gastric outlet obstruction      Patient Discharged from acute Occupational Therapy on 9/4/2020.  Patient had not yet been evaluated prior to transfer to ICU.    Assessment:      Patient transferred from [facility] to ICU on 9/4/20 due to shock per MD note. OT will discontinue current orders and await new orders when patient is medically appropriate for an evaluation.    Objective:     GOALS:   Multidisciplinary Problems     Occupational Therapy Goals     Not on file                Reasons for Discontinuation of Therapy Services  Transfer to alternate level of care.      Plan:     Patient Discharged to: ICU    Aline Sánchez OT  9/4/2020

## 2020-09-04 NOTE — ASSESSMENT & PLAN NOTE
51 yo F who was transferred from OSH for AES of esophageal stricture + gastric outlet obstruction. Upon arrival, tachycardic (110s) and hypotensive (90s/50s). Labs notable for hypoK. CT abdo/pelvis showed gastric distension, gastric outlet narrowing, obstructive-related changes at pyloric-duodenal junction, and thickening of GE junction. She underwent EGD on 9/3 which grade IV esophagitis with stricture. GI unable to pass adult scope through stricture, no peds scope was available. Patient was transferred to [facility] for AES evaluation.    Plan:  - NPO  - AES consulted, recs appreciated  - IV pantoprazole 40mg  - Anti-emetics  - Pain control once BP improves  - 1L NS

## 2020-09-04 NOTE — ASSESSMENT & PLAN NOTE
Held home gabapentin in attempt to prioritize PO medications   - Continue to hold in setting of abdominal perforation

## 2020-09-04 NOTE — CONSULTS
[facility]  Gastroenterology  Consult Note    Patient Name: Elda Hernandez  MRN: [id number]  Admission Date: 9/3/2020  Hospital Length of Stay: 1 days  Code Status: Full Code   Attending Provider: Vinny Banuelos MD   Consulting Provider: Fatuma Mathews MD  Primary Care Physician: Jordana Woods MD  Principal Problem:Gastric outlet obstruction    Inpatient consult to Advanced Endoscopy Service (AES)  Consult performed by: Fatuma Mathews MD  Consult ordered by: Khoa Blanc MD        Subjective:     HPI:  Patient is a 50-year-old female with past medical history of 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    Patient presented to [facility] on 09/04 for evaluation of abdominal pain.  Was reported as diffuse abdominal with nausea and vomiting for the month prior to presentation.  Symptoms were progressively getting worse associated with dysphagia and inability tolerate any liquids or solids with subsequent vomiting.  She had been losing weight as well.  Denies fevers or chills or change in bowel.    On presentation to the ED she was hemodynamically stable, labs with normal white cell count, alkaline phosphatase 303, AST/ALT 43/29.  CT scan of the abdomen reported with stomach significantly filled with fluid and distended suggestive of gastric outlet obstruction.  Thickening at the GE junction was also seen.  An EGD was done on 09/03 and showed severe esophagitis with narrowing at the gastroesophageal junction with inability to pass adult scope.  Pediatric scope was not available.  She was transferred for evaluation and management.  Of note the patient underwent an ERCP here in 2015 for evaluation of elevated LFTs with stent placement.  At that time the forward viewing gastroscope showed esophagitis.    Past Medical History:   Diagnosis Date    Angiolipoma of kidney  10/1/2018    Arnold-Chiari malformation     Depression     Esophageal stricture     Essential tremor     Hypertension     Left bundle branch block     Liver fibrosis, transplanted liver 10/2/2018    Suggested on fibroscan 10/2/18    Migraine without aura     MVP (mitral valve prolapse)     Non-rheumatic mitral regurgitation 10/1/2018    Non-rheumatic tricuspid valve insufficiency 10/1/2018    Osteoporosis     Recurrent urinary tract infection     Seizures     Shingles 2007    SIADH (syndrome of inappropriate ADH production)     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    Tricuspid valve prolapse     Urolithiasis     Von Gierke disease     s/p liver transplant       Past Surgical History:   Procedure Laterality Date    APPENDECTOMY  6/22/2007    COLONOSCOPY  5/13/2008    internal hemorrhoids    CRANIOTOMY      LAMINECTOMY  3/2001    LIVER TRANSPLANT  9/23/2002    OSSICULAR RECONSTRUCTION  10/4/1995    RIGHT REPLACEMENT PROSTHESIS for cholesteatoma    THYMECTOMY  5/2/2007    TONSILLECTOMY, ADENOIDECTOMY  1/21/2004    TOTAL ABDOMINAL HYSTERECTOMY  3/31/1994       Review of patient's allergies indicates:   Allergen Reactions    Codeine Itching     Other reaction(s): Itching    Lipitor [atorvastatin] Other (See Comments)     Other reaction(s): Muscle pain  Muscle cranmps    Morphine Itching     Other reaction(s): nausea and vomiting     Zoloft [sertraline] Other (See Comments)     Tremors/muscle spasms     Family History     None        Tobacco Use    Smoking status: Never Smoker   Substance and Sexual Activity    Alcohol use: No    Drug use: No    Sexual activity: Not on file     Review of Systems   Constitutional: Positive for appetite change and fatigue. Negative for activity change, chills and fever.   HENT: Positive for trouble swallowing.    Respiratory: Negative for cough, choking, chest tightness and shortness of breath.    Cardiovascular: Negative for chest pain and leg  swelling.   Gastrointestinal: Positive for abdominal pain, nausea and vomiting. Negative for abdominal distention, constipation, diarrhea and rectal pain.   Genitourinary: Negative for difficulty urinating and dysuria.   Musculoskeletal: Negative for arthralgias and back pain.   Skin: Negative for color change and pallor.   Neurological: Positive for dizziness. Negative for headaches.   Psychiatric/Behavioral: Negative for agitation and confusion.     Objective:     Vital Signs (Most Recent):  Temp: 98.2 °F (36.8 °C) (09/04/20 0509)  Pulse: (!) 126 (09/04/20 0509)  Resp: 18 (09/04/20 0509)  BP: (!) 96/57 (09/04/20 0055)  SpO2: (!) 94 % (09/04/20 0509) Vital Signs (24h Range):  Temp:  [97.7 °F (36.5 °C)-98.7 °F (37.1 °C)] 98.2 °F (36.8 °C)  Pulse:  [] 126  Resp:  [16-20] 18  SpO2:  [90 %-97 %] 94 %  BP: ()/() 96/57        There is no height or weight on file to calculate BMI.    No intake or output data in the 24 hours ending 09/04/20 0634    Lines/Drains/Airways     Airway                 Airway - Non-Surgical 09/03/20 1110 Nasal Cannula less than 1 day          Peripheral Intravenous Line                 Peripheral IV - Single Lumen Right Forearm -- days         Peripheral IV - Single Lumen 09/03/20 0400 20 G Left Antecubital 1 day                Physical Exam  Constitutional:       General: She is not in acute distress.     Appearance: She is well-developed.   HENT:      Head: Normocephalic.   Eyes:      General: No scleral icterus.     Conjunctiva/sclera: Conjunctivae normal.   Neck:      Musculoskeletal: Normal range of motion and neck supple.   Cardiovascular:      Rate and Rhythm: Regular rhythm. Tachycardia present.   Pulmonary:      Effort: Pulmonary effort is normal.      Breath sounds: Normal breath sounds.   Abdominal:      General: Bowel sounds are normal. There is no distension.      Palpations: Abdomen is soft. There is no mass.      Tenderness: There is abdominal tenderness  (epigastric). There is no guarding or rebound.   Musculoskeletal: Normal range of motion.   Skin:     General: Skin is warm and dry.   Neurological:      Mental Status: She is alert and oriented to person, place, and time.         Significant Labs:  CBC:   Recent Labs   Lab 09/03/20  0347 09/03/20  2149 09/04/20  0255 09/04/20  0500   WBC 11.03  --  4.62 6.47   HGB 13.5  --  15.0 14.3   HCT 41.1 44.5  44.5 47.2 44.7     --  388* 371*     BMP:   Recent Labs   Lab 09/04/20  0254   *   *   K 3.5      CO2 17*   BUN 33*   CREATININE 2.2*   CALCIUM 8.3*   MG 1.3*     CMP:   Recent Labs   Lab 09/04/20  0254   *   CALCIUM 8.3*   ALBUMIN 2.5*   PROT 6.1   *   K 3.5   CO2 17*      BUN 33*   CREATININE 2.2*   ALKPHOS 259*   ALT 15   AST 26   BILITOT 0.9     Coagulation: No results for input(s): PT, INR, APTT in the last 48 hours.    Significant Imaging:  Imaging results within the past 24 hours have been reviewed.    Assessment/Plan:     Esophageal stricture  Patient is a 50-year-old female with history of liver transplant due to von Gierke disease, who presented to the hospital with abdominal pain and inability to tolerate p.o. intake.  CT scan suggestive gastric outlet obstruction and gastroesophageal junction thickening.  She underwent EGD on 09/03 that showed esophagitis with esophageal stricture with inability to pass the adult scope.   Unclear etiology of the patient's CT scan findings and esophageal stricture.  She has history of liver transplant with immunosuppression with a wide differential diagnosis of peptic ulcer disease versus infectious etiology.     Initial plan was to proceed with EGD, however the patient became hemodynamically unstable and currently on 2 vasopressors and appears clinically ill.  She is being evaluated by primary team for possible etiologies.    -please keep the patient NPO and continue IV PPI  -workup for hemodynamic instability per primary team  (consideration for infectious versus cardiac causes) no signs of active bleeding  -defer EGD at this time until the patient is clinically stable        Thank you for your consult. I will follow-up with patient. Please contact us if you have any additional questions.    Domitila Mathews MD  Gastroenterology  [facility]

## 2020-09-04 NOTE — PROGRESS NOTES
Pt prepared for class A OR procedure. Surgical, Anaesthesia and Blood consent obtained, every consent reviewed with . Pre-op checklist completed, reports given to anaesthesia, awaiting for them to come get patient.

## 2020-09-04 NOTE — SUBJECTIVE & OBJECTIVE
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    Arnold-Chiari malformation     Depression     Esophageal stricture     Essential tremor     Hypertension     Left bundle branch block     Liver fibrosis, transplanted liver 10/2/2018    Suggested on fibroscan 10/2/18    Migraine without aura     MVP (mitral valve prolapse)     Non-rheumatic mitral regurgitation 10/1/2018    Non-rheumatic tricuspid valve insufficiency 10/1/2018    Osteoporosis     Recurrent urinary tract infection     Seizures     Shingles 2007    SIADH (syndrome of inappropriate ADH production)     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    Tricuspid valve prolapse     Urolithiasis     Von Gierke disease     s/p liver transplant       Past Surgical History:   Procedure Laterality Date    APPENDECTOMY  6/22/2007    COLONOSCOPY  5/13/2008    internal hemorrhoids    CRANIOTOMY      ESOPHAGOGASTRODUODENOSCOPY N/A 9/3/2020    Procedure: EGD (ESOPHAGOGASTRODUODENOSCOPY);  Surgeon: Tyrel Vergara MD;  Location: [facility];  Service: Endoscopy;  Laterality: N/A;    LAMINECTOMY  3/2001    LIVER TRANSPLANT  9/23/2002    OSSICULAR RECONSTRUCTION  10/4/1995    RIGHT REPLACEMENT PROSTHESIS for cholesteatoma    THYMECTOMY  5/2/2007    TONSILLECTOMY, ADENOIDECTOMY  1/21/2004    TOTAL ABDOMINAL HYSTERECTOMY  3/31/1994       Review of patient's allergies indicates:   Allergen Reactions    Codeine Itching     Other reaction(s): Itching    Lipitor [atorvastatin] Other (See Comments)     Other reaction(s): Muscle pain  Muscle cranmps    Morphine Itching     Other reaction(s): nausea and vomiting     Zoloft [sertraline] Other (See Comments)     Tremors/muscle spasms       Family History     None        Tobacco Use    Smoking status: Never Smoker   Substance and Sexual Activity    Alcohol use: No    Drug use: No    Sexual activity: Not on file      Review of Systems   Constitutional: Positive for  activity change and fatigue. Negative for chills, diaphoresis and fever.   HENT: Negative for congestion, nosebleeds, rhinorrhea, sinus pressure, sore throat and trouble swallowing.    Eyes: Negative for visual disturbance.   Respiratory: Positive for chest tightness. Negative for cough, choking, shortness of breath and wheezing.    Cardiovascular: Negative for chest pain, palpitations and leg swelling.   Gastrointestinal: Positive for abdominal distention and abdominal pain. Negative for blood in stool, constipation, diarrhea, nausea and vomiting.   Endocrine: Negative for polydipsia, polyphagia and polyuria.   Genitourinary: Negative for dysuria and hematuria.   Musculoskeletal: Negative for arthralgias and myalgias.   Skin: Negative for pallor and rash.   Neurological: Positive for light-headedness. Negative for dizziness, seizures, syncope, weakness, numbness and headaches.   Psychiatric/Behavioral: Negative for agitation and confusion.     Objective:     Vital Signs (Most Recent):  Temp: 98.2 °F (36.8 °C) (09/04/20 0509)  Pulse: (!) 145 (09/04/20 1130)  Resp: 20 (09/04/20 1130)  BP: (!) 96/57 (09/04/20 0055)  SpO2: (!) 93 % (09/04/20 1130) Vital Signs (24h Range):  Temp:  [97.7 °F (36.5 °C)-98.2 °F (36.8 °C)] 98.2 °F (36.8 °C)  Pulse:  [] 145  Resp:  [16-33] 20  SpO2:  [90 %-98 %] 93 %  BP: ()/() 96/57   Weight: 55.8 kg (123 lb 0.3 oz)  Body mass index is 24.85 kg/m².    No intake or output data in the 24 hours ending 09/04/20 1217    Physical Exam  Vitals signs and nursing note reviewed.   Constitutional:       General: She is not in acute distress.     Appearance: She is well-developed. She is ill-appearing and toxic-appearing.   HENT:      Head: Normocephalic and atraumatic.      Mouth/Throat:      Pharynx: No oropharyngeal exudate.   Eyes:      Conjunctiva/sclera: Conjunctivae normal.      Pupils: Pupils are equal, round, and reactive to light.   Neck:      Musculoskeletal: Normal range of  motion and neck supple.   Cardiovascular:      Rate and Rhythm: Normal rate and regular rhythm.      Heart sounds: Normal heart sounds. No murmur.   Pulmonary:      Effort: Pulmonary effort is normal. No respiratory distress.      Breath sounds: Normal breath sounds. No wheezing or rales.   Abdominal:      General: Bowel sounds are decreased. There is no distension.      Palpations: Abdomen is soft.      Tenderness: There is generalized abdominal tenderness (significant epigastric + mild generalized). There is guarding and rebound.      Hernia: No hernia is present.   Musculoskeletal:         General: No tenderness.   Skin:     General: Skin is cool and dry.      Capillary Refill: Capillary refill takes 2 to 3 seconds.      Coloration: Skin is pale.      Findings: No rash.   Neurological:      Mental Status: She is alert and oriented to person, place, and time.      GCS: GCS eye subscore is 4. GCS verbal subscore is 5. GCS motor subscore is 6.      Cranial Nerves: No cranial nerve deficit.      Motor: No abnormal muscle tone.   Psychiatric:         Behavior: Behavior normal.       Vents:  Vent Mode: A/C (09/04/20 1130)  Ventilator Initiated: Yes (09/04/20 1130)  Set Rate: 24 BPM (09/04/20 1130)  Vt Set: 330 mL (09/04/20 1130)  PEEP/CPAP: 3 cmH20 (09/04/20 1130)  Peak Airway Pressure: 20 cmH2O (09/04/20 1130)  Total Ve: 8.07 mL (09/04/20 1130)  F/VT Ratio<105 (RSBI): (!) 59.35 (09/04/20 1130)  Lines/Drains/Airways     Central Venous Catheter Line            Percutaneous Central Line Insertion/Assessment - Triple Lumen  09/04/20 0800 right internal jugular less than 1 day          Airway                 Airway - Non-Surgical 09/03/20 1110 Nasal Cannula 1 day         Airway - Non-Surgical 09/04/20 1122 Endotracheal Tube less than 1 day          Arterial Line            Arterial Line 09/04/20 0708 Right Radial less than 1 day          Peripheral Intravenous Line                 Peripheral IV - Single Lumen Right Forearm  -- days              Significant Labs:    CBC/Anemia Profile:  Recent Labs   Lab 09/04/20  0500 09/04/20  0719 09/04/20  0734 09/04/20  0736 09/04/20  0939   WBC 6.47 9.49  --  7.29  --   --    HGB 14.3 13.2  --  11.0*  --   --    HCT 44.7 41.4   < > 34.8* 32* 32*   * 377*  --  283  --   --    MCV 85 87  --  87  --   --    RDW 14.6* 14.5  --  14.5  --   --     < > = values in this interval not displayed.        Chemistries:  Recent Labs   Lab 09/04/20  0500 09/04/20  0719 09/04/20  0734 09/04/20  0929    137 131* 133*   K 3.6 3.8 3.8 3.2*    112* 109 111*   CO2 13* 13* 12* 16*   BUN 34* 35* 34* 32*   CREATININE 2.3* 2.4* 2.6* 2.1*   CALCIUM 8.1* 7.5* 6.8* 6.9*   ALBUMIN 2.2* 2.0* 1.5* 1.5*   PROT 5.3* 4.9* 4.0* 3.8*   BILITOT 0.8 0.9 0.7 0.7   ALKPHOS 221* 194* 153* 146*   ALT 15 20 14 30   AST 26 43* 38 56*   MG 1.4* 1.2* 1.1*  --    PHOS 3.0 3.2 2.9  --        A1C:   Recent Labs   Lab 09/03/20  0347   HGBA1C 4.3     ABGs:   Recent Labs   Lab 09/04/20  0939   PH 7.202*   PCO2 34.4*   HCO3 13.5*   POCSATURATED 95   BE -15     Bilirubin:   Recent Labs   Lab 09/04/20  0254 09/04/20  0500 09/04/20  0719 09/04/20  0734 09/04/20  0929   BILITOT 0.9 0.8 0.9 0.7 0.7     Blood Culture: No results for input(s): LABBLOO in the last 48 hours.  Cardiac Markers: No results for input(s): CKMB, TROPONINT, MYOGLOBIN in the last 48 hours.  Coagulation:   Recent Labs   Lab 09/04/20  0719   INR 1.2   APTT 30.3     Lactic Acid:   Recent Labs   Lab 09/04/20  0719 09/04/20  0929   LACTATE 3.9* 4.7*     Lipid Panel: No results for input(s): CHOL, HDL, LDLCALC, TRIG, CHOLHDL in the last 48 hours.  Prealbumin: No results for input(s): PREALBUMIN in the last 48 hours.  Respiratory Culture: No results for input(s): GSRESP, RESPIRATORYC in the last 48 hours.  Urine Culture: No results for input(s): LABURIN in the last 48 hours.  Urine Studies:   Recent Labs   Lab 09/03/20  0604   COLORU Yellow   APPEARANCEUA Clear   PHUR  "7.5   SPECGRAV >=1.030*   PROTEINUA 3+*   GLUCUA Negative   KETONESU 1+*   BILIRUBINUA Negative   OCCULTUA Negative   NITRITE Negative   UROBILINOGEN 1.0   LEUKOCYTESUR Negative   RBCUA 4   WBCUA 3   BACTERIA Negative   SQUAMEPITHEL 4   HYALINECASTS 25*       Significant Imaging:  X-ray Chest 1 View    Result Date: 9/4/2020  EXAMINATION: XR CHEST 1 VIEW CLINICAL HISTORY: Provided history is "sepsis;  ". TECHNIQUE: One view of the chest. COMPARISON: 09/03/2020. FINDINGS: Cardiac wires and other support devices overlie the chest.  Lung volumes are low.  Cardiac silhouette is not enlarged.  Postoperative changes of median sternotomy.  There is mild left basilar subsegmental atelectasis versus scarring.  No large focal consolidation.  No sizable pleural effusion.  No pneumothorax.  New right IJ central venous catheter overlies the SVC.     No focal consolidation or other acute abnormality. New right IJ central venous catheter overlies the SVC. Electronically signed by: Clark Morales MD Date:    09/04/2020 Time:    07:46    X-ray Chest Pa And Lateral    Result Date: 9/3/2020  EXAMINATION: XR CHEST PA AND LATERAL 09/03/2020 at 04:04 INDICATION: Chest abdominal epigastric pain.  No history of recent trauma or surgery is provided. COMPARISON Chest radiograph report of 03/25/2019. FINDINGS The cardiomediastinal silhouette is within normal along with the central vessels, fahad. No displaced fracture or dislocation is appreciated.  No pneumothorax, lobar consolidation or cardiac decompensation is appreciated. The sternal wires are aligned.  No significant pleural effusion is appreciated.  No significant interval changes are appreciated. IMPRESSION No lobar consolidation or cardiac decompensation is appreciated.  No significant interval change of parenchymal aeration is appreciated. Electronically signed by: Amos Woods MD Date:    09/03/2020 Time:    05:11    X-ray Abdomen Ap 1 View    Result Date: 9/4/2020  EXAMINATION: XR " ABDOMEN AP 1 VIEW CLINICAL HISTORY: ABdominal Pain, GE obstruction, concern for perf, too unstable for CT scanner; TECHNIQUE: Single AP View of the abdomen was performed. COMPARISON: CT, 09/03/2020. FINDINGS: Bilateral hemidiaphragm and upper abdomen are excluded from the field of view.  Cardiac wires and defibrillator pads overlie the mid upper abdomen.  There is mild gaseous distention of bowel in the left upper quadrant.  Moderate volume stool burden in the colon.  Bowel gas pattern is overall nonobstructive.  The stomach is not well evaluated.  Contrast is present in the urinary bladder from prior CT scan.  Bones are unremarkable.     Minimal gaseous distention of small bowel in the left upper quadrant.  Stomach is not well evaluated and the bilateral hemidiaphragm are excluded from the field of view. Electronically signed by: Clark Morales MD Date:    09/04/2020 Time:    07:44    Ct Abdomen Pelvis With Contrast    Result Date: 9/3/2020  EXAMINATION: CT ABDOMEN PELVIS WITH CONTRAST 09/03/2020 at 0425. INDICATION: Abdominal pain, fever; history of previous hysterectomy, appendectomy and liver transplant. COMPARISON: CT abdomen report of 04/04/2019.  Hepatic ultrasound report of 09/24/2019. TECHNIQUE: Axial contrast-enhanced images are obtained of the abdomen and pelvis with coronal and sagittal reconstruction views using dose automated exposure control. 80 cc Omnipaque 350 contrast utilized.  Automatic dose reduction is utilized. DLP is 11 60 mGy cm. FINDINGS No lung base pneumothorax or lobar type consolidation is appreciated. No layering free air or free fluid collections are appreciated. There are subcentimeter para-aortic, mesenteric lymph nodes present.  This could be seen with previous inflammation, adenitis sequela.  Small hiatal hernia with slight gastroesophageal fold thickening is appreciated.  Hysterectomy changes are present.  There is some chronic calcific change about the left psoas, vascular  margin unchanged significantly.  This can also be seen with some chronic vascular or vasquez calcification.  The stomach is fluid filled and significantly distended.  This corresponds with some irregular thickening and transitioning about the gastric pylorus.  There appears to be some luminal air averaging and could represent some ulceration or diverticular related change.  There is nodular somewhat lobulated contour of the liver similar overall.  There are some chronic renal, renal vascular calcifications with some calcified cystic appearance on the right unchanged significantly.  There is some vascular ectasia appearance about the left renal hilum also similar.  There are some varices, portal hypertension appearing changes similar overall.  The common bile duct measures approximately 8 mm with no radiopaque obstructive calculus currently appreciated as well as minimal prominence of the intrahepatic bile ducts similar overall.  The bladder is incompletely fluid-filled for evaluation which may exaggerate the wall thickness.  This can also be seen with chronic postinflammatory sequela.  There is some mild chronic diverticulosis present overall.  Some aspects of the colon are however under distended for evaluation although may be peristaltic related.  There is similar otherwise interval overall appearance of the liver, adrenal glands, kidneys, spleen and pancreas. No displaced fracture or dislocation is appreciated. There is abundant stool throughout the colon suggestive of constipation.  This limits mucosal detail evaluation. Correlate overall with the patient's clinical findings and laboratory values for follow up.  No other significant interval changes are appreciated. IMPRESSION 1. The stomach is significantly fluid-filled and distended.  This appears to correlate with some outlet narrowing, obstructive related change at the pyloric, duodenal junction.  This could be seen with processes including inflammation,  neoplastic although some transient intussusception related change could also present in this fashion.  There is also some thickening at the gastroesophageal junction.  This can be correlated with direct visualization, endoscopy. 2. There is some hepatic lobulated, nodular contour although similar with history of previous liver transplant. 3.  There is abundant stool throughout the colon suggestive of constipation.  The findings are overall concordant with [facility]. Electronically signed by: Amos Woods MD Date:    09/03/2020 Time:    06:24    Ct Chest Abdomen Pelvis Without Contrast (xpd)    Result Date: 9/4/2020  EXAMINATION: CT CHEST ABDOMEN PELVIS WITHOUT CONTRAST(XPD) CLINICAL HISTORY: Gastric outlet obstruction; TECHNIQUE: Axial images of the chest, abdomen, and pelvis without the use of oral or IV contrast.  Coronal and sagittal reconstructions were also obtained COMPARISON: CT abdomen pelvis 09/03/2020.  CT abdomen pelvis 04/04/2019.  CT thorax 04/25/2007. FINDINGS: Thoracic soft tissues: Normal thyroid. Right central venous catheter with tip in the SVC. Aorta: Normal in course and caliber.  Scattered calcific atherosclerosis.  No aneurysmal dilatation. Heart: Normal in size.  Trace pericardial effusion.  Calcific coronary atherosclerosis. Shira/Mediastinum: No significant mediastinal, hilar, or axillary lymphadenopathy Lungs: The trachea and upper airways are patent.  Trace bilateral pleural effusions with associated compressive atelectasis of the lung bases.  0.5 cm spiculated nodule in the right middle lobe (series 2, image 37) or which has decreased in size compared to prior exam dated 04/25/2007 suggestive of benign etiology.. Liver: Changes of reported liver transplant.  The liver demonstrates nodular appearance similar to prior exams.  No focal lesions.. The gallbladder is surgically absent.  No intrahepatic or extrahepatic biliary ductal dilatation. The pancreas, spleen and adrenals are unremarkable.  Kidneys/ Ureters: There is persistent nephrogram likely related to renal dysfunction from delayed excretion from prior exam.  Right renal cystic lesion with peripheral calcification, unchanged.  No hydronephrosis or hydroureter.  Bilateral renal calcifications which could represent vascular calcifications versus nonobstructing nephrolithiasis. The bladder is distended and filled contrast material from prior exam. Status post hysterectomy. Bowel/Mesentery: In this patient with concern for gastric outlet obstruction and severe esophagitis, there is diffuse thickening of the esophagus with interval decompression of the stomach.  There is small volume of scattered free intraperitoneal air in the upper abdomen and moderate volume of scattered abdominopelvic free fluid these findings are highly concerning for perforated viscus likely at the level of the stomach or proximal small bowel.  No evidence of pneumomediastinum. There are multiple mildly dilated small bowel loops without evidence of high-grade bowel obstruction. Lymph nodes: No lymphadenapathy. Abdominal wall:  Unremarkable. Bones: No acute fracture. No suspicious osseous lesions.     In this patient with concern for gastric outlet obstruction and severe esophagitis, there is diffuse thickening of the esophagus with interval decompression of the stomach. There is small volume of scattered free intraperitoneal air in the upper abdomen and moderate volume of scattered abdominopelvic free fluid these findings are highly concerning for perforated viscus likely at the level of the stomach or proximal small bowel. No evidence of pneumomediastinum. New small bilateral pleural effusions with compressive atelectasis. Postoperative changes of liver transplant, hysterectomy and cholecystectomy. Additional findings as above. This report was flagged in Epic as abnormal. This critical information above was relayed by Dr. Burrell to Dr. Herbert with medical ICU team at 11:17 on  09/04/2020. Electronically signed by resident: Federica Burrell MD Date:    09/04/2020 Time:    11:15 Electronically signed by: David Turner MD Date:    09/04/2020 Time:    11:55

## 2020-09-04 NOTE — ASSESSMENT & PLAN NOTE
Patient with acute episode of hypotension, stabilized with IVF resuscitation & pressor support  Admitted for gastric obstruction, with worsening complaints  SOFA 7  Worsening lactic acid despite fluid resucitation  Lab Results   Component Value Date    LACTATE 4.7 (HH) 09/04/2020   Likely distributive shock with abdominal perforation as source   - Continue pressor support with goal MAP >65   - Continue IVF resucitation

## 2020-09-04 NOTE — ASSESSMENT & PLAN NOTE
Patient hasn't been taking home levothyroxine  TSH, free T4 ordered  Holding home levothyroxine until thyroid studies result

## 2020-09-04 NOTE — PROGRESS NOTES
Pt intubated at bedside by Dr. Rogers with Dr. Banuelos assisting. Pt tolerated well, lung sounds confirmed, chest x-ray ordered, 02 sats 100%. EICU activated during procedure.

## 2020-09-04 NOTE — SIGNIFICANT EVENT
Paged by nursing around 6:10 AM that patient was bradycardic, hypotensive, and diaphoretic with inability to obtain IV access. IV fluids (1L) were ordered previously however unable to administer 2/2 IV loss. Assessed at bedside, Rapid Response and ICU present. Multiple attempts for IV placement made via US. BP low on manual cuff. Ryan bumps given to help with BP. Glucose 92. STAT Isat labs, ABG, lactate, troponin ordered. Abdominal xray STAT ordered given concern for possible perforation given initial presentation to [facility] for gastric outlet obstruction and AES eval. EKG with ST depressions and few ST elevations, concern for MI. Cardiology fellow contacted STAT. Pt then became more awake, alert answering questions. Complained of pleuritic chest pain. After inability to gain IV access and persistent hypotension, decision was made to transfer patient to ICU for central line, IV pressors, and closer monitoring.       Germania Conway MD  Internal Medicine PGY-2  Hospital Medicine Team 5

## 2020-09-04 NOTE — SIGNIFICANT EVENT
Attempted right radial art line x2 without success. Further attempt and successful placement by Dr. Dailey.    Abena Beth NP  Critical Care Medicine

## 2020-09-04 NOTE — ASSESSMENT & PLAN NOTE
49 yo F who was transferred from OSH for AES of esophageal stricture + gastric outlet obstruction. Upon arrival, tachycardic (110s) and hypotensive (90s/50s). Labs notable for hypoK. CT abdo/pelvis showed gastric distension, gastric outlet narrowing, obstructive-related changes at pyloric-duodenal junction, and thickening of GE junction. She underwent EGD on 9/3 which grade IV esophagitis with stricture. GI unable to pass adult scope through stricture, no peds scope was available. Patient was transferred to [facility] [place] [facility] for AES evaluation.  - Patient now with what seems to be a perforated viscus as evidenced by change in imaging and worsening symptoms with physical exam consistent with acute abdomen   - Continue NPO   - AES consulted, recs appreciated   - IV pantoprazole 40mg   - Anti-emetics   - Pain control   - IVFs

## 2020-09-04 NOTE — ASSESSMENT & PLAN NOTE
Ms. Hernandez is a 51yo F w/PMH of von Gierke disease s/p liver transplant (2002, on tacro + MMF, follows Dr. Nielsen), hx chronic rejection (bx 2015 with acute and chronic rejection s/p steroids), biliary stricture and ductopenic rejection, recently with cirrhosis on fibroscan (10/2019), HTN, and depression who presents as a transfer for further evaluation of gastric outlet obstruction and esophageal stricturing.  EGD 9/3 at OSH with severe esophageal stricturing so transferred for GI evaluation.  Course complicated by hypotension, lactic acidosis, JACQUE and AMS, admitted to MICU.  Hepatology consult for immunosuppression management.    Kidney function is worsening  Liver tests are normal  Tacrolimus level is therapeutic  Home dose is tacrolimus 2mg AM / 1mg PM and cellcept 500mg bid.    - hold tacrolimus as level is therapeutic and in setting of JACQUE  - hold cellcept in setting of possible sepsis  -  Check daily tacrolimus trough, CBC, CMP and INR
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
## 2020-09-04 NOTE — CONSULTS
[facility]  Vascular Surgery  Consult Note    Consults  Subjective:     Chief Complaint/Reason for Admission: Ischemic R hand    History of Present Illness: 50 y.o. female that has a past medical history of Angiolipoma of kidney (10/1/2018), Arnold-Chiari malformation, Depression, Esophageal stricture, Essential tremor, Hypertension, Liver fibrosis, Osteoporosis, Recurrent urinary tract infection, Seizures, SIADH (syndrome of inappropriate ADH production), Squamous cell carcinoma (10/2014), and Von Gierke disease s/p liver transplant (2002, on tacro + MMF), HTN, and depression that presented as a transfer for GI evaluation from [facility] with complaints of diffuse abdominal pain over the course of 3 weeks. Underwent endoscopy that showed GE junction stricture, unable to pass an adult scope through stricture, no peds scope was available. [place] [facility] [place] On admission, she became acutely hypotensive. Repeat CT showed decompressed abdomen and evidence of free air. She underwent laparotomy earlier today and found to have a perforated stomach, greater curvature with diffuse contamination.  She underwent washout, repair of gastric perforation with omental patch, gastrostomy tube placement, left open with abthera in place.  She is on vasopressin.  She had an chloé placed in her right radial and per report this was a difficult placement.  It was noticed this afternoon that she had mottling of her right hand.  Signals could not be obtained but there was still a tracing on the line.  Vascular surgery consulted for this.     She is intubated and sedated and unable to give a full history and ROS.    Medications Prior to Admission   Medication Sig Dispense Refill Last Dose    mycophenolate (CELLCEPT) 250 mg Cap Take 2 capsules (500 mg total) by mouth 2 (two) times daily. 360 capsule 6     tacrolimus (PROGRAF) 1 MG Cap TAKE 2 CAPSULES BY MOUTH EVERY MORNING  AND 1 CAPSULE EVERY EVENING 90 capsule 11     calcium carbonate (OS-JASON) 600 mg (1,500 mg) Tab Take 600 mg by mouth 2 (two) times daily with meals.       demeclocycline (DECLOMYCIN) 150 MG Tab Take 150 mg by mouth every 12 (twelve) hours.       DENAVIR 1 % cream RICHAR EXT AA Q 2 H FOR 4 DAYS  0     levothyroxine (SYNTHROID) 75 MCG tablet Take 75 mcg by mouth once daily.       lifitegrast (XIIDRA) 5 % Dpet Place 1 drop into both eyes 2 (two) times daily. (Patient not taking: Reported on 3/3/2020) 60 each 11     magnesium oxide (MAG-OX) 400 mg (241.3 mg magnesium) tablet TK 2 TS PO BID  5     multivitamin capsule Take 1 capsule by mouth once daily.       potassium chloride (KLOR-CON) 10 MEQ TbSR Take 4 tablets (40 mEq total) by mouth once daily. 120 tablet 3     promethazine (PHENERGAN) 25 MG tablet Take 25 mg by mouth every 4 (four) hours as needed (if unrelieved by zofran).   5     triamcinolone acetonide 0.1% (KENALOG) 0.1 % cream Apply topically 2 (two) times daily. (Patient not taking: Reported on 3/3/2020) 454 g 1        Review of patient's allergies indicates:   Allergen Reactions    Codeine Itching     Other reaction(s): Itching    Lipitor [atorvastatin] Other (See Comments)     Other reaction(s): Muscle pain  Muscle cranmps    Morphine Itching     Other reaction(s): nausea and vomiting     Zoloft [sertraline] Other (See Comments)     Tremors/muscle spasms       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    Arnold-Chiari malformation     Depression     Esophageal stricture     Essential tremor     Hypertension     Left bundle branch block     Liver fibrosis, transplanted liver 10/2/2018    Suggested on fibroscan 10/2/18    Migraine without aura     MVP (mitral valve prolapse)     Non-rheumatic mitral regurgitation 10/1/2018    Non-rheumatic tricuspid valve insufficiency 10/1/2018    Osteoporosis     Recurrent urinary tract infection     Seizures      Shingles 2007    SIADH (syndrome of inappropriate ADH production)     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    Tricuspid valve prolapse     Urolithiasis     Von Gierke disease     s/p liver transplant     Past Surgical History:   Procedure Laterality Date    APPENDECTOMY  6/22/2007    COLONOSCOPY  5/13/2008    internal hemorrhoids    CRANIOTOMY      ESOPHAGOGASTRODUODENOSCOPY N/A 9/3/2020    Procedure: EGD (ESOPHAGOGASTRODUODENOSCOPY);  Surgeon: Tyrel Vergara MD;  Location: [facility];  Service: Endoscopy;  Laterality: N/A;    LAMINECTOMY  3/2001    LIVER TRANSPLANT  9/23/2002    OSSICULAR RECONSTRUCTION  10/4/1995    RIGHT REPLACEMENT PROSTHESIS for cholesteatoma    THYMECTOMY  5/2/2007    TONSILLECTOMY, ADENOIDECTOMY  1/21/2004    TOTAL ABDOMINAL HYSTERECTOMY  3/31/1994     Family History     None        Tobacco Use    Smoking status: Never Smoker   Substance and Sexual Activity    Alcohol use: No    Drug use: No    Sexual activity: Not on file     Review of Systems   Unable to perform ROS: Intubated     Objective:     Vital Signs (Most Recent):  Temp: 98.6 °F (37 °C) (09/04/20 1600)  Pulse: (!) 116 (09/04/20 1815)  Resp: (!) 24 (09/04/20 1815)  BP: 138/67 (09/04/20 1730)  SpO2: 96 % (09/04/20 1815) Vital Signs (24h Range):  Temp:  [97.7 °F (36.5 °C)-98.6 °F (37 °C)] 98.6 °F (37 °C)  Pulse:  [] 116  Resp:  [4-34] 24  SpO2:  [93 %-100 %] 96 %  BP: ()/() 138/67  Arterial Line BP: ()/(53-93) 138/53     Weight: 55.8 kg (123 lb)  Body mass index is 24.84 kg/m².    Physical Exam  Constitutional:       Appearance: She is toxic-appearing.      Comments: Intubated, sedated   HENT:      Head: Normocephalic.      Nose: Nose normal.      Mouth/Throat:      Mouth: Mucous membranes are moist.   Eyes:      Pupils: Pupils are equal, round, and reactive to light.   Neck:      Comments: Support lines in place  Cardiovascular:      Rate and Rhythm: Tachycardia present.       Comments: Palpable femoral pulses bilaterally.  Unable to obtain radial or ulnar signals in either hand.  Bedside ultrasound shows short segment occlusion of the radial artery just proximal to a-line.  Ulnar is diminutive and spasmed but there is flow.  Triphasic doppler signal in radial proximal to occlusion, triphasic signal in brachial artery on right.  Thre is flow in the left hand as well on color dopler ultrasound but no doppler signal.  There is brachial signal.  Pulmonary:      Comments: Intubated and sedated  Abdominal:      Comments: Abthera in place   Musculoskeletal:      Comments: Mottling mostly in RUE up to shoulder, but also on to breast.  LUE there is mottling of the hand.  There is mottling of the abdomen and thighs as well.  Over all appears quite pale   Skin:     Capillary Refill: Capillary refill takes 2 to 3 seconds.      Comments: Cool, clamy, capillary refill bilaterally         Significant Labs:  BMP:   Recent Labs   Lab 09/04/20  0734 09/04/20  0929   * 114*   * 133*   K 3.8 3.2*    111*   CO2 12* 16*   BUN 34* 32*   CREATININE 2.6* 2.1*   CALCIUM 6.8* 6.9*   MG 1.1*  --      CMP:   Recent Labs   Lab 09/04/20  0929   *   CALCIUM 6.9*   ALBUMIN 1.5*   PROT 3.8*   *   K 3.2*   CO2 16*   *   BUN 32*   CREATININE 2.1*   ALKPHOS 146*   ALT 30   AST 56*   BILITOT 0.7     Lactic Acid:   Recent Labs   Lab 09/04/20  1335   LACTATE 4.1*       Significant Diagnostics:  No vascular imaging yet    Assessment/Plan:     Ischemia of right upper extremity  50 year old female with sepsis from perforated gastric ulcer, concern for ischemic right hand.  Appears radial artery is thrombosed on bedside ultrasound.  Severe vessel spasm on bedside ultrasound bilaterally.  Mottling is now global, more likely sepsis related    -Will obtain stat RUE arterial duplex to document patency of ulnar artery  -Recommend removing a-line and replacing into femoral under ultrasound guidance  by a senior resident or staff  -Recommend anticoagulation at the discretion of primary, no bolus needed  -Warm hands and feet actively  -recommend nitropaste to R hand if pressure can tollerate       Thank you for your consult.    Victor Hugo Sharma MD  Vascular Surgery  [facility]

## 2020-09-04 NOTE — SUBJECTIVE & OBJECTIVE
Review of Systems   Unable to perform ROS: Mental status change       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    Arnold-Chiari malformation     Depression     Esophageal stricture     Essential tremor     Hypertension     Left bundle branch block     Liver fibrosis, transplanted liver 10/2/2018    Suggested on fibroscan 10/2/18    Migraine without aura     MVP (mitral valve prolapse)     Non-rheumatic mitral regurgitation 10/1/2018    Non-rheumatic tricuspid valve insufficiency 10/1/2018    Osteoporosis     Recurrent urinary tract infection     Seizures     Shingles 2007    SIADH (syndrome of inappropriate ADH production)     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    Tricuspid valve prolapse     Urolithiasis     Von Gierke disease     s/p liver transplant       Past Surgical History:   Procedure Laterality Date    APPENDECTOMY  6/22/2007    COLONOSCOPY  5/13/2008    internal hemorrhoids    CRANIOTOMY      ESOPHAGOGASTRODUODENOSCOPY N/A 9/3/2020    Procedure: EGD (ESOPHAGOGASTRODUODENOSCOPY);  Surgeon: Tyrel Vergara MD;  Location: [facility];  Service: Endoscopy;  Laterality: N/A;    LAMINECTOMY  3/2001    LIVER TRANSPLANT  9/23/2002    OSSICULAR RECONSTRUCTION  10/4/1995    RIGHT REPLACEMENT PROSTHESIS for cholesteatoma    THYMECTOMY  5/2/2007    TONSILLECTOMY, ADENOIDECTOMY  1/21/2004    TOTAL ABDOMINAL HYSTERECTOMY  3/31/1994       Family history of liver disease: No    Review of patient's allergies indicates:   Allergen Reactions    Codeine Itching     Other reaction(s): Itching    Lipitor [atorvastatin] Other (See Comments)     Other reaction(s): Muscle pain  Muscle cranmps    Morphine Itching     Other reaction(s): nausea and vomiting     Zoloft [sertraline] Other (See Comments)     Tremors/muscle spasms       Tobacco Use    Smoking status: Never Smoker   Substance and Sexual Activity    Alcohol use: No    Drug use: No    Sexual  activity: Not on file       Medications Prior to Admission   Medication Sig Dispense Refill Last Dose    acyclovir (ZOVIRAX) 400 MG tablet TK 1 T PO FID FOR 5 DAYS  3     amitriptyline (ELAVIL) 75 MG tablet TK 1 T PO  QD  0     butalbital-acetaminophen  mg Tab TK 1 T PO Q 4 H PRN. NTE 6 H  4     calcium carbonate (OS-JASON) 600 mg (1,500 mg) Tab Take 600 mg by mouth 2 (two) times daily with meals.       clonazePAM (KLONOPIN) 0.5 MG tablet Take 0.5 mg by mouth 2 (two) times daily as needed for Anxiety.       demeclocycline (DECLOMYCIN) 150 MG Tab Take 150 mg by mouth every 12 (twelve) hours.       DENAVIR 1 % cream RICHAR EXT AA Q 2 H FOR 4 DAYS  0     ergocalciferol (ERGOCALCIFEROL) 50,000 unit Cap Take 50,000 Units by mouth every 7 days.       gabapentin (NEURONTIN) 300 MG capsule Take 300 mg by mouth 3 (three) times daily.       hydrOXYzine HCl (ATARAX) 25 MG tablet Take 1 tablet (25 mg total) by mouth every 8 (eight) hours as needed for Itching. 90 tablet 1     lactulose (CHRONULAC) 10 gram/15 mL solution TAKE 20 ML BY MOUTH TWICE DAILY (Patient not taking: Reported on 3/3/2020) 3645 mL 0     levothyroxine (SYNTHROID) 75 MCG tablet Take 75 mcg by mouth once daily.       lifitegrast (XIIDRA) 5 % Dpet Place 1 drop into both eyes 2 (two) times daily. (Patient not taking: Reported on 3/3/2020) 60 each 11     lisinopril (PRINIVIL,ZESTRIL) 40 MG tablet TK 1 T PO QD  5     magnesium oxide (MAG-OX) 400 mg (241.3 mg magnesium) tablet TK 2 TS PO BID  5     multivitamin capsule Take 1 capsule by mouth once daily.       mycophenolate (CELLCEPT) 250 mg Cap Take 2 capsules (500 mg total) by mouth 2 (two) times daily. 120 capsule 0     mycophenolate (CELLCEPT) 250 mg Cap Take 2 capsules (500 mg total) by mouth 2 (two) times daily. 360 capsule 6     ondansetron (ZOFRAN) 8 MG tablet TAKE 1 TABLET(8 MG) BY MOUTH EVERY 8 HOURS AS NEEDED FOR NAUSEA 30 tablet 0     pantoprazole (PROTONIX) 40 MG tablet Take 1  tablet (40 mg total) by mouth 2 (two) times daily. 60 tablet 2     potassium chloride (KLOR-CON) 10 MEQ TbSR Take 4 tablets (40 mEq total) by mouth once daily. 120 tablet 3     promethazine (PHENERGAN) 25 MG tablet Take 25 mg by mouth every 4 (four) hours as needed (if unrelieved by zofran).   5     saliva stimulant agents comb.3 (BIOTENE MOISTURIZING MOUTH) Spry by Mucous Membrane route daily as needed.       tacrolimus (PROGRAF) 1 MG Cap TAKE 2 CAPSULES BY MOUTH EVERY MORNING AND 1 CAPSULE EVERY EVENING 90 capsule 11     triamcinolone acetonide 0.1% (KENALOG) 0.1 % cream Apply topically 2 (two) times daily. (Patient not taking: Reported on 3/3/2020) 454 g 1     venlafaxine (EFFEXOR-XR) 150 MG Cp24 TK 1 C PO QD WF  1        Objective:     Vital Signs (Most Recent):  Temp: 98.2 °F (36.8 °C) (09/04/20 0509)  Pulse: (!) 144 (09/04/20 0736)  Resp: (!) 26 (09/04/20 0746)  BP: (!) 96/57 (09/04/20 0055)  SpO2: 97 % (09/04/20 0736) Vital Signs (24h Range):  Temp:  [97.7 °F (36.5 °C)-98.2 °F (36.8 °C)] 98.2 °F (36.8 °C)  Pulse:  [] 144  Resp:  [16-33] 26  SpO2:  [90 %-97 %] 97 %  BP: ()/() 96/57        There is no height or weight on file to calculate BMI.    Physical Exam  Vitals signs and nursing note reviewed.   Constitutional:       General: She is not in acute distress.  HENT:      Head: Normocephalic and atraumatic.   Eyes:      General: No scleral icterus.  Cardiovascular:      Rate and Rhythm: Tachycardia present.   Pulmonary:      Effort: Pulmonary effort is normal.   Abdominal:      General: Bowel sounds are normal. There is no distension.      Palpations: Abdomen is soft.      Tenderness: There is abdominal tenderness (throughout).   Musculoskeletal:      Right lower leg: No edema.      Left lower leg: No edema.   Skin:     Coloration: Skin is not jaundiced.   Neurological:      General: No focal deficit present.      Mental Status: She is lethargic.         MELD-Na score: 18 at 9/4/2020   7:19 AM  MELD score: 16 at 9/4/2020  7:19 AM  Calculated from:  Serum Creatinine: 2.2 mg/dL at 9/4/2020  2:54 AM  Serum Sodium: 135 mmol/L at 9/4/2020  2:54 AM  Total Bilirubin: 0.9 mg/dL (Rounded to 1 mg/dL) at 9/4/2020  2:54 AM  INR(ratio): 1.2 at 9/4/2020  7:19 AM  Age: 50 years 3 months    Significant Labs:  Labs within the past month have been reviewed.    Significant Imaging:  Reviewed

## 2020-09-04 NOTE — RESPIRATORY THERAPY
Patient intubated with 7.0 ETT at 24 cm jessica at lips. Placed on ventilator with documented settings. Will continue to monitor.

## 2020-09-04 NOTE — ASSESSMENT & PLAN NOTE
Patient admitted for AES evaluation of gastric outlet obstruction, presenting with worsening symptoms  Had episode of hypotension on day of admit, transferred to ICU  Physical exam consistent with acute abdomen  Repeat imaging and labs indicating perforation  Likely etiology of perforation due to gastric outlet obstruction   - Zosyn initiated empirically, continue   - Continue IVF    - Continue pressor support   - Surgery on board to evaluate, appreciate recommendations   - Intubation for airway protection/ reduce metabolic demands of shock   - Continue to follow labs

## 2020-09-04 NOTE — OP NOTE
Date of procedure - 09/04/2020  Preoperative diagnosis - peritonitis with gastrointestinal perforation  Postoperative diagnosis - same with perforation localized the greater curvature the proximal stomach  Procedure - exploratory laparotomy vigorous irrigation of abdominal contamination repair of gastric perforation omental patch in gastrostomy tube placement finally a wound VAC (ABThera) was applied  Surgeon - Michael  Assist - Ta and Jacks  Anesthesia - general  Blood loss - minimal  Indications - this is a 50-year-old patient taken emergently to the operating room after transfer from [facility] and management on the medical ICU service patient had CT scan evidence of free air there was evidence of gastric outlet obstruction on a scan done prior to and endoscopy that potentially may have resulted in the perforation although this is unclear since the obstruction was at the GE junction at perforation occurred distal to this on the greater curvature of the proximal stomach  Operative report in detail - patient brought the operating placed in supine position prepped draped sterile fashion after satisfactory general anesthesia was induced  Upper midline incision was made from xiphoid to umbilicus peritoneal cavity was uneventfully entered a laura retractor was applied the abdomen was then vigorously irrigated was filled with about 2 L of succus eventually after 10 L of irrigation we were able to clear the effluent  The greater curvature of the stomach was mobilized by dividing the gastro mental ligament including the distal short gastrics with the stomach retracted inferiorly we were able to appreciate a 3 cm perforation in the proximal anterior wall the stomach toward the greater curve this was primarily suture repaired with interrupted 2 0 silk  An 18 Bhardwaj was brought into left upper quadrant stab incision and secured to the stomach distal to the perforation and a standard Witzel fashion as a gastrostomy  tube decompression  Two hundred nineteen Adrinao drains were brought in through lateral stab incisions and placed in the upper abdomen on either side of the GE junction  Due to the overwhelming amount of gross contamination decision was made to leave the patient's abdomen open placed an ABThera wound VAC with plans to return the patient to the operating room in 24-48 hours for additional washout  Patient was critical throughout the procedure but somewhat stabilized by the end of the operation  She was transported back to the ICU in critical condition all

## 2020-09-04 NOTE — ASSESSMENT & PLAN NOTE
50 year old female with sepsis from perforated gastric ulcer, concern for ischemic right hand.  Appears radial artery is thrombosed on bedside ultrasound.  Severe vessel spasm on bedside ultrasound bilaterally.  Mottling is now global, more likely sepsis related    -Will obtain stat RUE arterial duplex to document patency of ulnar artery  -Recommend removing a-line and replacing into femoral under ultrasound guidance by a senior resident or staff  -Recommend anticoagulation at the discretion of primary, no bolus needed  -Warm hands and feet actively  -recommend nitropaste to R hand if pressure can tollerate

## 2020-09-04 NOTE — PLAN OF CARE
SW is following this Pt for DC planning needs. There are no identified needs at this time. Discharge Disposition: TBD - pending patient progress; patient to OR today.    SW will continue to coordinate with patient, family, team and insurance to complete patient's discharge plan.    Tigist Moody LMSW   - Case Management

## 2020-09-04 NOTE — CONSULTS
[facility]  Hepatology  Consult Note    Patient Name: Elda Hernandez  MRN: [id number]  Admission Date: 9/3/2020  Hospital Length of Stay: 1 days  Attending Provider: Vinny Banuelos MD   Primary Care Physician: Jrodana Woods MD  Principal Problem:Gastric outlet obstruction    Inpatient consult to Hepatology  Consult performed by: Js Matias MD  Consult ordered by: Khoa Blanc MD        Subjective:     Transplant status: Post-transplant    HPI:  Ms. Hernandez is a 49yo F w/PMH of von Gierke disease s/p liver transplant (2002, on tacro + MMF, follows Dr. Nielsen), hx chronic rejection (bx 2015 with acute and chronic rejection s/p steroids), biliary stricture and ductopenic rejection, recently with cirrhosis on fibroscan (10/2019), HTN, and depression who presents as a transfer for further evaluation of gastric outlet obstruction and esophageal stricturing.  Hepatology consult for immunosuppression management.    Patient is a poor historian-received fentanyl this morning.  She presented to [facility] with 3 weeks of mid/epigastric abdominal pain with associated nausea and vomiting.  She is on tacrolimus 2mg AM / 1mg PM and cellcept 500mg bid at home for immunosuppression.  CT abdo/pelvis showed gastric distension, gastric outlet obstruction, obstructive-related changes at pyloric-duodenal junction, and thickening of GE junction.  EGD on 9/3 which grade IV esophagitis with stricture, unable to pass gastroscope and no pediatric scope available so transferred here.    Labs initially with hypokalemia and mildly elevated AST with otherwise normal LFTs, lipase, CBC and INR.  COVID negative.  Admitted to hospital medicine here, but overnight became hypotensive and lethargic so transferred to MICU, started on vasopressin. Repeat labs with normal LFTs, JACQUE with Cr 2.2, lactate 3.9, BCx sent.    Review of Systems   Unable to perform ROS: Mental status change       Past Medical History:    Diagnosis Date    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    Arnold-Chiari malformation     Depression     Esophageal stricture     Essential tremor     Hypertension     Left bundle branch block     Liver fibrosis, transplanted liver 10/2/2018    Suggested on fibroscan 10/2/18    Migraine without aura     MVP (mitral valve prolapse)     Non-rheumatic mitral regurgitation 10/1/2018    Non-rheumatic tricuspid valve insufficiency 10/1/2018    Osteoporosis     Recurrent urinary tract infection     Seizures     Shingles 2007    SIADH (syndrome of inappropriate ADH production)     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    Tricuspid valve prolapse     Urolithiasis     Von Gierke disease     s/p liver transplant       Past Surgical History:   Procedure Laterality Date    APPENDECTOMY  6/22/2007    COLONOSCOPY  5/13/2008    internal hemorrhoids    CRANIOTOMY      ESOPHAGOGASTRODUODENOSCOPY N/A 9/3/2020    Procedure: EGD (ESOPHAGOGASTRODUODENOSCOPY);  Surgeon: Tyrel Vergara MD;  Location: [facility];  Service: Endoscopy;  Laterality: N/A;    LAMINECTOMY  3/2001    LIVER TRANSPLANT  9/23/2002    OSSICULAR RECONSTRUCTION  10/4/1995    RIGHT REPLACEMENT PROSTHESIS for cholesteatoma    THYMECTOMY  5/2/2007    TONSILLECTOMY, ADENOIDECTOMY  1/21/2004    TOTAL ABDOMINAL HYSTERECTOMY  3/31/1994       Family history of liver disease: No    Review of patient's allergies indicates:   Allergen Reactions    Codeine Itching     Other reaction(s): Itching    Lipitor [atorvastatin] Other (See Comments)     Other reaction(s): Muscle pain  Muscle cranmps    Morphine Itching     Other reaction(s): nausea and vomiting     Zoloft [sertraline] Other (See Comments)     Tremors/muscle spasms       Tobacco Use    Smoking status: Never Smoker   Substance and Sexual Activity    Alcohol use: No    Drug use: No    Sexual activity: Not on file       Medications Prior to Admission   Medication Sig Dispense Refill  Last Dose    acyclovir (ZOVIRAX) 400 MG tablet TK 1 T PO FID FOR 5 DAYS  3     amitriptyline (ELAVIL) 75 MG tablet TK 1 T PO  QD  0     butalbital-acetaminophen  mg Tab TK 1 T PO Q 4 H PRN. NTE 6 H  4     calcium carbonate (OS-JASON) 600 mg (1,500 mg) Tab Take 600 mg by mouth 2 (two) times daily with meals.       clonazePAM (KLONOPIN) 0.5 MG tablet Take 0.5 mg by mouth 2 (two) times daily as needed for Anxiety.       demeclocycline (DECLOMYCIN) 150 MG Tab Take 150 mg by mouth every 12 (twelve) hours.       DENAVIR 1 % cream RICHAR EXT AA Q 2 H FOR 4 DAYS  0     ergocalciferol (ERGOCALCIFEROL) 50,000 unit Cap Take 50,000 Units by mouth every 7 days.       gabapentin (NEURONTIN) 300 MG capsule Take 300 mg by mouth 3 (three) times daily.       hydrOXYzine HCl (ATARAX) 25 MG tablet Take 1 tablet (25 mg total) by mouth every 8 (eight) hours as needed for Itching. 90 tablet 1     lactulose (CHRONULAC) 10 gram/15 mL solution TAKE 20 ML BY MOUTH TWICE DAILY (Patient not taking: Reported on 3/3/2020) 3645 mL 0     levothyroxine (SYNTHROID) 75 MCG tablet Take 75 mcg by mouth once daily.       lifitegrast (XIIDRA) 5 % Dpet Place 1 drop into both eyes 2 (two) times daily. (Patient not taking: Reported on 3/3/2020) 60 each 11     lisinopril (PRINIVIL,ZESTRIL) 40 MG tablet TK 1 T PO QD  5     magnesium oxide (MAG-OX) 400 mg (241.3 mg magnesium) tablet TK 2 TS PO BID  5     multivitamin capsule Take 1 capsule by mouth once daily.       mycophenolate (CELLCEPT) 250 mg Cap Take 2 capsules (500 mg total) by mouth 2 (two) times daily. 120 capsule 0     mycophenolate (CELLCEPT) 250 mg Cap Take 2 capsules (500 mg total) by mouth 2 (two) times daily. 360 capsule 6     ondansetron (ZOFRAN) 8 MG tablet TAKE 1 TABLET(8 MG) BY MOUTH EVERY 8 HOURS AS NEEDED FOR NAUSEA 30 tablet 0     pantoprazole (PROTONIX) 40 MG tablet Take 1 tablet (40 mg total) by mouth 2 (two) times daily. 60 tablet 2     potassium chloride  (KLOR-CON) 10 MEQ TbSR Take 4 tablets (40 mEq total) by mouth once daily. 120 tablet 3     promethazine (PHENERGAN) 25 MG tablet Take 25 mg by mouth every 4 (four) hours as needed (if unrelieved by zofran).   5     saliva stimulant agents comb.3 (BIOTENE MOISTURIZING MOUTH) Spry by Mucous Membrane route daily as needed.       tacrolimus (PROGRAF) 1 MG Cap TAKE 2 CAPSULES BY MOUTH EVERY MORNING AND 1 CAPSULE EVERY EVENING 90 capsule 11     triamcinolone acetonide 0.1% (KENALOG) 0.1 % cream Apply topically 2 (two) times daily. (Patient not taking: Reported on 3/3/2020) 454 g 1     venlafaxine (EFFEXOR-XR) 150 MG Cp24 TK 1 C PO QD WF  1        Objective:     Vital Signs (Most Recent):  Temp: 98.2 °F (36.8 °C) (09/04/20 0509)  Pulse: (!) 144 (09/04/20 0736)  Resp: (!) 26 (09/04/20 0746)  BP: (!) 96/57 (09/04/20 0055)  SpO2: 97 % (09/04/20 0736) Vital Signs (24h Range):  Temp:  [97.7 °F (36.5 °C)-98.2 °F (36.8 °C)] 98.2 °F (36.8 °C)  Pulse:  [] 144  Resp:  [16-33] 26  SpO2:  [90 %-97 %] 97 %  BP: ()/() 96/57        There is no height or weight on file to calculate BMI.    Physical Exam  Vitals signs and nursing note reviewed.   Constitutional:       General: She is not in acute distress.  HENT:      Head: Normocephalic and atraumatic.   Eyes:      General: No scleral icterus.  Cardiovascular:      Rate and Rhythm: Tachycardia present.   Pulmonary:      Effort: Pulmonary effort is normal.   Abdominal:      General: Bowel sounds are normal. There is no distension.      Palpations: Abdomen is soft.      Tenderness: There is abdominal tenderness (throughout).   Musculoskeletal:      Right lower leg: No edema.      Left lower leg: No edema.   Skin:     Coloration: Skin is not jaundiced.   Neurological:      General: No focal deficit present.      Mental Status: She is lethargic.         MELD-Na score: 18 at 9/4/2020  7:19 AM  MELD score: 16 at 9/4/2020  7:19 AM  Calculated from:  Serum Creatinine: 2.2  mg/dL at 9/4/2020  2:54 AM  Serum Sodium: 135 mmol/L at 9/4/2020  2:54 AM  Total Bilirubin: 0.9 mg/dL (Rounded to 1 mg/dL) at 9/4/2020  2:54 AM  INR(ratio): 1.2 at 9/4/2020  7:19 AM  Age: 50 years 3 months    Significant Labs:  Labs within the past month have been reviewed.    Significant Imaging:  Reviewed    Assessment/Plan:     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  Ms. Hernandez is a 49yo F w/PMH of von Gierke disease s/p liver transplant (2002, on tacro + MMF, follows Dr. Nielsen), hx chronic rejection (bx 2015 with acute and chronic rejection s/p steroids), biliary stricture and ductopenic rejection, recently with cirrhosis on fibroscan (10/2019), HTN, and depression who presents as a transfer for further evaluation of gastric outlet obstruction and esophageal stricturing.  EGD 9/3 at OSH with severe esophageal stricturing so transferred for GI evaluation.  Course complicated by hypotension, lactic acidosis, JACQUE and AMS, admitted to MICU.  Hepatology consult for immunosuppression management.    Kidney function is worsening  Liver tests are normal  Tacrolimus level is therapeutic  Home dose is tacrolimus 2mg AM / 1mg PM and cellcept 500mg bid.    - hold tacrolimus as level is therapeutic and in setting of JACQUE  - hold cellcept in setting of possible sepsis  -  Check daily tacrolimus trough, CBC, CMP and INR        Thank you for your consult. I will follow-up with patient. Please contact us if you have any additional questions.    Js Matias MD  Hepatology  [facility]

## 2020-09-04 NOTE — ASSESSMENT & PLAN NOTE
Patient hasn't been taking home levothyroxine, asymptomatic  TSH, free T4 WNL  Held home medication on admit   - Continue holding home levothyroxine while in ICU

## 2020-09-04 NOTE — TRANSFER OF CARE
"Anesthesia Transfer of Care Note    Patient: Elda Hernandez    Procedure(s) Performed: Procedure(s) (LRB):  LAPAROTOMY, EXPLORATORY (N/A)    Patient location: ICU    Anesthesia Type: general    Transport from OR: Transported from OR intubated on 100% O2 by AMBU with assisted ventilation    Post pain: adequate analgesia    Post assessment: no apparent anesthetic complications    Post vital signs: stable    Level of consciousness: sedated    Nausea/Vomiting: no nausea/vomiting    Complications: none    Transfer of care protocol was followed      Last vitals:   Visit Vitals  BP (!) 96/57 (BP Location: Right leg, Patient Position: Lying)   Pulse (!) 145   Temp 36.8 °C (98.2 °F) (Oral)   Resp 20   Ht 4' 11" (1.499 m)   Wt 55.8 kg (123 lb 0.3 oz)   SpO2 (!) 93%   BMI 24.85 kg/m²     "

## 2020-09-04 NOTE — PROCEDURES
Elda Hernandez is a 50 y.o. female patient.    Temp: 98.2 °F (36.8 °C) (09/04/20 0509)  Pulse: (!) 144 (09/04/20 0736)  Resp: (!) 33 (09/04/20 0736)  BP: (!) 96/57 (09/04/20 0055)  SpO2: 97 % (09/04/20 0736)       Insert peripheral IV    Date/Time: 9/4/2020 6:30 AM  Performed by: Abena Beth NP  Authorized by: Abena Beth NP   Consent: Verbal consent obtained.  Risks and benefits: risks, benefits and alternatives were discussed  Consent given by: patient  Patient understanding: patient states understanding of the procedure being performed  Patient consent: the patient's understanding of the procedure matches consent given  Procedure consent: procedure consent matches procedure scheduled  Required items: required blood products, implants, devices, and special equipment available  Patient identity confirmed: verbally with patient  Local anesthesia used: no    Anesthesia:  Local anesthesia used: no    Sedation:  Patient sedated: no    Patient tolerance: patient tolerated the procedure well with no immediate complications  Comments: Nursing staff unable to obtain adequate PIV access.   22g PIV placed in right index finger on second attempt. + blood return, flushes easily.             Abena Beth  9/4/2020

## 2020-09-04 NOTE — HPI
Patient Elda Hernandez is a 50 y.o. female that has a past medical history of Angiolipoma of kidney (10/1/2018), Arnold-Chiari malformation, Depression, Esophageal stricture, Essential tremor, Hypertension, Liver fibrosis, Osteoporosis, Recurrent urinary tract infection, Seizures, SIADH (syndrome of inappropriate ADH production), Squamous cell carcinoma (10/2014), and Von Gierke disease s/p liver transplant (2002, on tacro + MMF), HTN, and depression that presented as a transfer for GI evaluation.      Patient initially presented to [facility] with complaints of diffuse abdominal pain that progressively worsened over the course of 3 weeks. Associated with nausea, vomiting, and decreased PO intake. Pain worsened with food, decreased in intensity with smaller meals. Initial labs notable for hypoK. UA showed 3+ protein. CT abdo/pelvis showed gastric distension, gastric outlet narrowing, obstructive-related changes at pyloric-duodenal junction, and thickening of GE junction. Patient was admitted to  for further evaluation. She underwent EGD on 9/3 which grade IV esophagitis with stricture. GI unable to pass an adult scope through stricture, no peds scope was available. Patient was transferred to [facility] for AES evaluation.     On day of admission a rapid response was called due to patient being hypotensive: Doppler BP 62/0; HR 120s, SpO2 99%. Lethargic, diaphoretic. Oriented to person and place.  Abdomen distended and patient guarding.  Complaining of chest pain when breathing.  ABG attempted.  12-lead EKG obtained.  500mcg hayes given.  Levo gtt initiated. Bolus fluids given with adequate response.  Transferred to ICU for CCM management. Lactic acid at that time was 3.9 with repeat after fluid bolus increasing, Imaging on admit showed distended stomach consistent with obstruction, repeat CT showed decompressed abdomen.
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
## 2020-09-04 NOTE — PROCEDURES
Elda Hernandez is a 50 y.o. female patient.    Temp: 98.2 °F (36.8 °C) (09/04/20 0509)  Pulse: (!) 144 (09/04/20 0736)  Resp: (!) 26 (09/04/20 0746)  BP: (!) 96/57 (09/04/20 0055)  SpO2: 97 % (09/04/20 0736)       Arterial Line    Date/Time: 9/4/2020 8:10 AM  Location procedure was performed: [facility] SURGICAL ICU (SICU)  Performed by: Denilson Dailey MD  Authorized by: Denilson Dailey MD   Pre-op Diagnosis: shock  Post-operative diagnosis: shock  Consent Done: Emergent Situation  Preparation: Patient was prepped and draped in the usual sterile fashion.  Indications: hemodynamic monitoring  Location: right radial    Anesthesia:  Local Anesthetic: lidocaine 1% without epinephrine  Anesthetic total: 1 mL  Patient sedated: no  Winston's test normal: yes  Needle gauge: 22  Seldinger technique: Seldinger technique used  Number of attempts: 1  Complications: No  Estimated blood loss (mL): 1  Post-procedure: line sutured and dressing applied  Post-procedure CMS: unchanged  Patient tolerance: Patient tolerated the procedure well with no immediate complications          Denilson Dailey  9/4/2020

## 2020-09-04 NOTE — H&P
[facility]  Critical Care - Surgery  History & Physical    Patient Name: Elda Hernandez  MRN: [id number]  Admission Date: 9/3/2020  Code Status: Full Code  Attending Physician: Clark Rodriguez MD   Primary Care Provider: Jordana Woods MD   Principal Problem: Perforated abdominal viscus    Subjective:     HPI:  Patient Elda Hernandez is a 50 y.o. female that has a past medical history of Angiolipoma of kidney (10/1/2018), Arnold-Chiari malformation, Depression, Esophageal stricture, Essential tremor, Hypertension, Liver fibrosis, Osteoporosis, Recurrent urinary tract infection, Seizures, SIADH (syndrome of inappropriate ADH production), Squamous cell carcinoma (10/2014), and Von Gierke disease s/p liver transplant (2002, on tacro + MMF), HTN, and depression that presented as a transfer for GI evaluation.      Patient initially presented to [facility] with complaints of diffuse abdominal pain that progressively worsened over the course of 3 weeks. Associated with nausea, vomiting, and decreased PO intake. Pain worsened with food, decreased in intensity with smaller meals. Initial labs notable for hypoK. UA showed 3+ protein. CT abdo/pelvis showed gastric distension, gastric outlet narrowing, obstructive-related changes at pyloric-duodenal junction, and thickening of GE junction. Patient was admitted to  for further evaluation. She underwent EGD on 9/3 which grade IV esophagitis with stricture. GI unable to pass an adult scope through stricture, no peds scope was available. [place] [facility] [place]      On day of admission a rapid response was called due to patient being hypotensive: Doppler BP 62/0; HR 120s, SpO2 99%. Lethargic, diaphoretic. Oriented to person and place.  Abdomen distended and patient guarding.  Complaining of chest pain when breathing.  ABG attempted.  12-lead EKG obtained.  500mcg hayes given.  Levo gtt initiated. Bolus fluids  given with adequate response.  Transferred to ICU for CCM management. Lactic acid at that time was 3.9 with repeat after fluid bolus increasing, Imaging on admit showed distended stomach consistent with obstruction, repeat CT showed decompressed abdomen and evidence of free air.     Hospital/ICU Course:   Pt presents to SICU following abdominal washout and surgical repair of Gi perforation.  Omental patch placed to repair perf to greater curvature of stomach.  G-tube and wound vac placed with plan for return to OR on Sunday 9/6.    Arrived to unit on vaso/levo.  Intubated and sedated with propofol/fentanyl drips    Follow-up For: Procedure(s) (LRB):  LAPAROTOMY, EXPLORATORY (N/A)    Post-Operative Day: Day of Surgery     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    Arnold-Chiari malformation     Depression     Esophageal stricture     Essential tremor     Hypertension     Left bundle branch block     Liver fibrosis, transplanted liver 10/2/2018    Suggested on fibroscan 10/2/18    Migraine without aura     MVP (mitral valve prolapse)     Non-rheumatic mitral regurgitation 10/1/2018    Non-rheumatic tricuspid valve insufficiency 10/1/2018    Osteoporosis     Recurrent urinary tract infection     Seizures     Shingles 2007    SIADH (syndrome of inappropriate ADH production)     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    Tricuspid valve prolapse     Urolithiasis     Von Gierke disease     s/p liver transplant       Past Surgical History:   Procedure Laterality Date    APPENDECTOMY  6/22/2007    COLONOSCOPY  5/13/2008    internal hemorrhoids    CRANIOTOMY      ESOPHAGOGASTRODUODENOSCOPY N/A 9/3/2020    Procedure: EGD (ESOPHAGOGASTRODUODENOSCOPY);  Surgeon: Tyrel Vergara MD;  Location: [facility];  Service: Endoscopy;  Laterality: N/A;    LAMINECTOMY  3/2001    LIVER TRANSPLANT  9/23/2002    OSSICULAR RECONSTRUCTION  10/4/1995    RIGHT REPLACEMENT  PROSTHESIS for cholesteatoma    THYMECTOMY  5/2/2007    TONSILLECTOMY, ADENOIDECTOMY  1/21/2004    TOTAL ABDOMINAL HYSTERECTOMY  3/31/1994       Review of patient's allergies indicates:   Allergen Reactions    Codeine Itching     Other reaction(s): Itching    Lipitor [atorvastatin] Other (See Comments)     Other reaction(s): Muscle pain  Muscle cranmps    Morphine Itching     Other reaction(s): nausea and vomiting     Zoloft [sertraline] Other (See Comments)     Tremors/muscle spasms       Family History     None        Tobacco Use    Smoking status: Never Smoker   Substance and Sexual Activity    Alcohol use: No    Drug use: No    Sexual activity: Not on file      Review of Systems   Unable to perform ROS: Intubated     Objective:     Vital Signs (Most Recent):  Temp: 98.2 °F (36.8 °C) (09/04/20 0509)  Pulse: 105 (09/04/20 1415)  Resp: 20 (09/04/20 1130)  BP: (!) 96/57 (09/04/20 1415)  SpO2: (!) 93 % (09/04/20 1130) Vital Signs (24h Range):  Temp:  [97.7 °F (36.5 °C)-98.2 °F (36.8 °C)] 98.2 °F (36.8 °C)  Pulse:  [105-145] 105  Resp:  [16-33] 20  SpO2:  [93 %-98 %] 93 %  BP: ()/() 96/57     Weight: 55.8 kg (123 lb)  Body mass index is 24.84 kg/m².      Intake/Output Summary (Last 24 hours) at 9/4/2020 1456  Last data filed at 9/4/2020 1400  Gross per 24 hour   Intake --   Output 225 ml   Net -225 ml       Physical Exam   Vitals signs and nursing note reviewed.   Constitutional:       General:Sedated and Intubated with mechanical ventilation     Appearance: She is well-developed. She is ill-appearing and toxic-appearing.   HENT:      Head: Normocephalic and atraumatic. RIJ central line.       Mouth/Throat: Intubated  Cardiovascular:      Rate and Rhythm: Tachycardic, regular rhythm.      Heart sounds: Normal heart sounds. No murmur.   Pulmonary:      Effort: Mechanically ventilated    Abdominal:      Open abdominal surgical wound with abthera wound vac in place.  KERRY drainsx2. G-tube           Skin:     General: Skin is cool and dry.      Coloration: Skin is pale.    Neurological:      Mental Status: Under sedation        Vents:  Vent Mode: A/C (09/04/20 1424)  Ventilator Initiated: Yes (09/04/20 1130)  Set Rate: 24 BPM (09/04/20 1424)  Vt Set: 330 mL (09/04/20 1424)  PEEP/CPAP: 5 cmH20 (09/04/20 1424)  Peak Airway Pressure: 18 cmH2O (09/04/20 1424)  Total Ve: 8.01 mL (09/04/20 1424)  F/VT Ratio<105 (RSBI): (!) 59.35 (09/04/20 1130)    Lines/Drains/Airways     Central Venous Catheter Line            Percutaneous Central Line Insertion/Assessment - Triple Lumen  09/04/20 0800 right internal jugular less than 1 day          Drain                 Closed/Suction Drain 09/04/20 1301 Abdomen Bulb 19 Fr. less than 1 day         Closed/Suction Drain 09/04/20 1302 Abdomen Bulb 19 Fr. less than 1 day         Urethral Catheter 09/04/20 1300 Non-latex;Straight-tip 18 Fr. less than 1 day          Airway                 Airway - Non-Surgical 09/04/20 1122 Endotracheal Tube less than 1 day          Arterial Line            Arterial Line 09/04/20 0708 Right Radial less than 1 day          Peripheral Intravenous Line                 Peripheral IV - Single Lumen Right Forearm -- days                Significant Labs:    CBC/Anemia Profile:  Recent Labs   Lab 09/04/20  0719 09/04/20  0734 09/04/20  0736 09/04/20  0939 09/04/20  1335   WBC 9.49  --  7.29  --   --  8.19   HGB 13.2  --  11.0*  --   --  10.6*   HCT 41.4   < > 34.8* 32* 32* 33.4*   *  --  283  --   --  239   MCV 87  --  87  --   --  86   RDW 14.5  --  14.5  --   --  14.6*    < > = values in this interval not displayed.        Chemistries:  Recent Labs   Lab 09/04/20  0500 09/04/20  0719 09/04/20  0734 09/04/20  0929    137 131* 133*   K 3.6 3.8 3.8 3.2*    112* 109 111*   CO2 13* 13* 12* 16*   BUN 34* 35* 34* 32*   CREATININE 2.3* 2.4* 2.6* 2.1*   CALCIUM 8.1* 7.5* 6.8* 6.9*   ALBUMIN 2.2* 2.0* 1.5* 1.5*   PROT 5.3* 4.9* 4.0* 3.8*   BILITOT  0.8 0.9 0.7 0.7   ALKPHOS 221* 194* 153* 146*   ALT 15 20 14 30   AST 26 43* 38 56*   MG 1.4* 1.2* 1.1*  --    PHOS 3.0 3.2 2.9  --          Significant Imaging: I have reviewed all pertinent imaging results/findings within the past 24 hours.    Assessment/Plan:   Neuro:  -sedation: propofol gtt  -analgesia: fentanyl gtt     CV:  - requiring pressor support.  Levo/Vaso drips, wean as tolerated  - titrate to MAP>60    Pulm:  - intubated, mechanically ventilated.      FEN/GI: Perforated stomach; s/p washout and patch (9/4)   - s/p washout and GI perf repair 9/4.    - plan for return to OR 9/6  - Fluid bolus upon arrival  - Cont Zosyn  - Pantoprazole 40mg IV daily  - Daily metabolic panel with PRN repletion of electrolytes    Renal: Pt with JACQUE upon arrival  - BUN/Cr: 32/2.1 (9/4)  - fluid bolus upon arrival  - Continue to monitor with daily metabolic labs    HEME/ID:  - Hb 10.6  - daily CBC  - s/p Liver transplant in 2002; holding immunosuppressants perioperatively    Endo:  - Hx of hypothyroidism; not taking synthroid at home         Critical care was time spent personally by me on the following activities: development of treatment plan with patient or surrogate and bedside caregivers, discussions with consultants, evaluation of patient's response to treatment, examination of patient, ordering and performing treatments and interventions, ordering and review of laboratory studies, ordering and review of radiographic studies, pulse oximetry, re-evaluation of patient's condition.  This critical care time did not overlap with that of any other provider or involve time for any procedures.     John Spivey MD  Critical Care - Surgery  [facility]

## 2020-09-05 PROBLEM — N17.9 AKI (ACUTE KIDNEY INJURY): Status: ACTIVE | Noted: 2020-09-05

## 2020-09-05 LAB
ALBUMIN SERPL BCP-MCNC: 1 G/DL (ref 3.5–5.2)
ALBUMIN SERPL BCP-MCNC: 2 G/DL (ref 3.5–5.2)
ALBUMIN SERPL BCP-MCNC: 2.9 G/DL (ref 3.5–5.2)
ALBUMIN SERPL BCP-MCNC: 2.9 G/DL (ref 3.5–5.2)
ALLENS TEST: ABNORMAL
ALP SERPL-CCNC: 101 U/L (ref 55–135)
ALP SERPL-CCNC: 80 U/L (ref 55–135)
ALP SERPL-CCNC: 96 U/L (ref 55–135)
ALT SERPL W/O P-5'-P-CCNC: 107 U/L (ref 10–44)
ALT SERPL W/O P-5'-P-CCNC: 117 U/L (ref 10–44)
ALT SERPL W/O P-5'-P-CCNC: 77 U/L (ref 10–44)
AMMONIA PLAS-SCNC: 48 UMOL/L (ref 10–50)
ANION GAP SERPL CALC-SCNC: 12 MMOL/L (ref 8–16)
ANION GAP SERPL CALC-SCNC: 8 MMOL/L (ref 8–16)
ANISOCYTOSIS BLD QL SMEAR: SLIGHT
ANISOCYTOSIS BLD QL SMEAR: SLIGHT
APTT BLDCRRT: 55.7 SEC (ref 21–32)
AST SERPL-CCNC: 118 U/L (ref 10–40)
AST SERPL-CCNC: 204 U/L (ref 10–40)
AST SERPL-CCNC: 205 U/L (ref 10–40)
BACTERIA #/AREA URNS AUTO: ABNORMAL /HPF
BASOPHILS # BLD AUTO: 0.03 K/UL (ref 0–0.2)
BASOPHILS # BLD AUTO: 0.03 K/UL (ref 0–0.2)
BASOPHILS NFR BLD: 0.4 % (ref 0–1.9)
BASOPHILS NFR BLD: 0.4 % (ref 0–1.9)
BILIRUB DIRECT SERPL-MCNC: 0.9 MG/DL (ref 0.1–0.3)
BILIRUB DIRECT SERPL-MCNC: 0.9 MG/DL (ref 0.1–0.3)
BILIRUB SERPL-MCNC: 0.7 MG/DL (ref 0.1–1)
BILIRUB SERPL-MCNC: 1.1 MG/DL (ref 0.1–1)
BILIRUB SERPL-MCNC: 1.1 MG/DL (ref 0.1–1)
BILIRUB UR QL STRIP: NEGATIVE
BUN SERPL-MCNC: 26 MG/DL (ref 6–20)
BUN SERPL-MCNC: 31 MG/DL (ref 6–20)
BURR CELLS BLD QL SMEAR: ABNORMAL
CALCIUM SERPL-MCNC: 6.7 MG/DL (ref 8.7–10.5)
CALCIUM SERPL-MCNC: 7.8 MG/DL (ref 8.7–10.5)
CHLORIDE SERPL-SCNC: 108 MMOL/L (ref 95–110)
CHLORIDE SERPL-SCNC: 115 MMOL/L (ref 95–110)
CHLORIDE UR-SCNC: 110 MMOL/L (ref 25–200)
CLARITY UR REFRACT.AUTO: ABNORMAL
CO2 SERPL-SCNC: 15 MMOL/L (ref 23–29)
CO2 SERPL-SCNC: 18 MMOL/L (ref 23–29)
COLOR UR AUTO: ABNORMAL
CREAT SERPL-MCNC: 1.6 MG/DL (ref 0.5–1.4)
CREAT SERPL-MCNC: 2.2 MG/DL (ref 0.5–1.4)
CREAT UR-MCNC: 113 MG/DL (ref 15–325)
CREAT UR-MCNC: 19 MG/DL (ref 15–325)
DELSYS: ABNORMAL
DIFFERENTIAL METHOD: ABNORMAL
DIFFERENTIAL METHOD: ABNORMAL
EOSINOPHIL # BLD AUTO: 0 K/UL (ref 0–0.5)
EOSINOPHIL # BLD AUTO: 0 K/UL (ref 0–0.5)
EOSINOPHIL NFR BLD: 0 % (ref 0–8)
EOSINOPHIL NFR BLD: 0.1 % (ref 0–8)
ERYTHROCYTE [DISTWIDTH] IN BLOOD BY AUTOMATED COUNT: 14.9 % (ref 11.5–14.5)
ERYTHROCYTE [DISTWIDTH] IN BLOOD BY AUTOMATED COUNT: 15.3 % (ref 11.5–14.5)
ERYTHROCYTE [SEDIMENTATION RATE] IN BLOOD BY WESTERGREN METHOD: 20 MM/H
ERYTHROCYTE [SEDIMENTATION RATE] IN BLOOD BY WESTERGREN METHOD: 22 MM/H
ERYTHROCYTE [SEDIMENTATION RATE] IN BLOOD BY WESTERGREN METHOD: 24 MM/H
EST. GFR  (AFRICAN AMERICAN): 28.9 ML/MIN/1.73 M^2
EST. GFR  (AFRICAN AMERICAN): 42.4 ML/MIN/1.73 M^2
EST. GFR  (NON AFRICAN AMERICAN): 25 ML/MIN/1.73 M^2
EST. GFR  (NON AFRICAN AMERICAN): 36.8 ML/MIN/1.73 M^2
FIO2: 40
FIO2: 50
FIO2: 50
GLUCOSE SERPL-MCNC: 66 MG/DL (ref 70–110)
GLUCOSE SERPL-MCNC: 87 MG/DL (ref 70–110)
GLUCOSE UR QL STRIP: NEGATIVE
HCO3 UR-SCNC: 15.1 MMOL/L (ref 24–28)
HCO3 UR-SCNC: 16.2 MMOL/L (ref 24–28)
HCO3 UR-SCNC: 17 MMOL/L (ref 24–28)
HCO3 UR-SCNC: 18.5 MMOL/L (ref 24–28)
HCT VFR BLD AUTO: 21 % (ref 37–48.5)
HCT VFR BLD AUTO: 27.6 % (ref 37–48.5)
HGB BLD-MCNC: 6.8 G/DL (ref 12–16)
HGB BLD-MCNC: 9 G/DL (ref 12–16)
HGB UR QL STRIP: ABNORMAL
HYALINE CASTS UR QL AUTO: 0 /LPF
HYPOCHROMIA BLD QL SMEAR: ABNORMAL
HYPOCHROMIA BLD QL SMEAR: ABNORMAL
IMM GRANULOCYTES # BLD AUTO: 0.1 K/UL (ref 0–0.04)
IMM GRANULOCYTES # BLD AUTO: 0.2 K/UL (ref 0–0.04)
IMM GRANULOCYTES NFR BLD AUTO: 1.4 % (ref 0–0.5)
IMM GRANULOCYTES NFR BLD AUTO: 2.5 % (ref 0–0.5)
INR PPP: 1.4 (ref 0.8–1.2)
INR PPP: 1.5 (ref 0.8–1.2)
INR PPP: 1.5 (ref 0.8–1.2)
KETONES UR QL STRIP: NEGATIVE
LACTATE SERPL-SCNC: 1.8 MMOL/L (ref 0.5–2.2)
LACTATE SERPL-SCNC: 4 MMOL/L (ref 0.5–2.2)
LACTATE SERPL-SCNC: 4.2 MMOL/L (ref 0.5–2.2)
LACTATE SERPL-SCNC: 5 MMOL/L (ref 0.5–2.2)
LEUKOCYTE ESTERASE UR QL STRIP: NEGATIVE
LYMPHOCYTES # BLD AUTO: 0.5 K/UL (ref 1–4.8)
LYMPHOCYTES # BLD AUTO: 1 K/UL (ref 1–4.8)
LYMPHOCYTES NFR BLD: 11.9 % (ref 18–48)
LYMPHOCYTES NFR BLD: 7.1 % (ref 18–48)
MAGNESIUM SERPL-MCNC: 1.2 MG/DL (ref 1.6–2.6)
MCH RBC QN AUTO: 27.8 PG (ref 27–31)
MCH RBC QN AUTO: 28 PG (ref 27–31)
MCHC RBC AUTO-ENTMCNC: 32.4 G/DL (ref 32–36)
MCHC RBC AUTO-ENTMCNC: 32.6 G/DL (ref 32–36)
MCV RBC AUTO: 85 FL (ref 82–98)
MCV RBC AUTO: 86 FL (ref 82–98)
MICROSCOPIC COMMENT: ABNORMAL
MIN VOL: 7
MODE: ABNORMAL
MONOCYTES # BLD AUTO: 0.5 K/UL (ref 0.3–1)
MONOCYTES # BLD AUTO: 0.9 K/UL (ref 0.3–1)
MONOCYTES NFR BLD: 10.7 % (ref 4–15)
MONOCYTES NFR BLD: 7.5 % (ref 4–15)
NEUTROPHILS # BLD AUTO: 5.9 K/UL (ref 1.8–7.7)
NEUTROPHILS # BLD AUTO: 6.1 K/UL (ref 1.8–7.7)
NEUTROPHILS NFR BLD: 74.4 % (ref 38–73)
NEUTROPHILS NFR BLD: 83.6 % (ref 38–73)
NITRITE UR QL STRIP: NEGATIVE
NRBC BLD-RTO: 0 /100 WBC
NRBC BLD-RTO: 0 /100 WBC
PCO2 BLDA: 26 MMHG (ref 35–45)
PCO2 BLDA: 29.2 MMHG (ref 35–45)
PCO2 BLDA: 31.5 MMHG (ref 35–45)
PCO2 BLDA: 35.9 MMHG (ref 35–45)
PEEP: 5
PH SMN: 7.26 [PH] (ref 7.35–7.45)
PH SMN: 7.34 [PH] (ref 7.35–7.45)
PH SMN: 7.37 [PH] (ref 7.35–7.45)
PH SMN: 7.41 [PH] (ref 7.35–7.45)
PH UR STRIP: 5 [PH] (ref 5–8)
PHOSPHATE SERPL-MCNC: 3.4 MG/DL (ref 2.7–4.5)
PHOSPHATE SERPL-MCNC: 3.5 MG/DL (ref 2.7–4.5)
PIP: 18
PLATELET # BLD AUTO: 158 K/UL (ref 150–350)
PLATELET # BLD AUTO: 86 K/UL (ref 150–350)
PLATELET BLD QL SMEAR: ABNORMAL
PLATELET BLD QL SMEAR: ABNORMAL
PMV BLD AUTO: 11.4 FL (ref 9.2–12.9)
PMV BLD AUTO: 11.6 FL (ref 9.2–12.9)
PO2 BLDA: 103 MMHG (ref 80–100)
PO2 BLDA: 45 MMHG (ref 40–60)
PO2 BLDA: 71 MMHG (ref 80–100)
PO2 BLDA: 93 MMHG (ref 80–100)
POC BE: -10 MMOL/L
POC BE: -11 MMOL/L
POC BE: -6 MMOL/L
POC BE: -9 MMOL/L
POC SATURATED O2: 75 % (ref 95–100)
POC SATURATED O2: 94 % (ref 95–100)
POC SATURATED O2: 97 % (ref 95–100)
POC SATURATED O2: 98 % (ref 95–100)
POC TCO2: 16 MMOL/L (ref 23–27)
POC TCO2: 17 MMOL/L (ref 24–29)
POC TCO2: 18 MMOL/L (ref 23–27)
POC TCO2: 19 MMOL/L (ref 23–27)
POCT GLUCOSE: 101 MG/DL (ref 70–110)
POCT GLUCOSE: 104 MG/DL (ref 70–110)
POCT GLUCOSE: 107 MG/DL (ref 70–110)
POCT GLUCOSE: 62 MG/DL (ref 70–110)
POCT GLUCOSE: 67 MG/DL (ref 70–110)
POCT GLUCOSE: 69 MG/DL (ref 70–110)
POCT GLUCOSE: 71 MG/DL (ref 70–110)
POCT GLUCOSE: 93 MG/DL (ref 70–110)
POIKILOCYTOSIS BLD QL SMEAR: SLIGHT
POLYCHROMASIA BLD QL SMEAR: ABNORMAL
POLYCHROMASIA BLD QL SMEAR: ABNORMAL
POTASSIUM SERPL-SCNC: 3.5 MMOL/L (ref 3.5–5.1)
POTASSIUM SERPL-SCNC: 4.4 MMOL/L (ref 3.5–5.1)
POTASSIUM UR-SCNC: 29 MMOL/L (ref 15–95)
PROT SERPL-MCNC: 2.9 G/DL (ref 6–8.4)
PROT SERPL-MCNC: 4.1 G/DL (ref 6–8.4)
PROT SERPL-MCNC: 4.5 G/DL (ref 6–8.4)
PROT UR QL STRIP: ABNORMAL
PROT UR-MCNC: 14 MG/DL (ref 0–15)
PROT/CREAT UR: 0.74 MG/G{CREAT} (ref 0–0.2)
PROTHROMBIN TIME: 15.4 SEC (ref 9–12.5)
PROTHROMBIN TIME: 16.7 SEC (ref 9–12.5)
PROTHROMBIN TIME: 16.7 SEC (ref 9–12.5)
RBC # BLD AUTO: 2.43 M/UL (ref 4–5.4)
RBC # BLD AUTO: 3.24 M/UL (ref 4–5.4)
RBC #/AREA URNS AUTO: 3 /HPF (ref 0–4)
SAMPLE: ABNORMAL
SARS-COV-2 RDRP RESP QL NAA+PROBE: NEGATIVE
SITE: ABNORMAL
SODIUM SERPL-SCNC: 138 MMOL/L (ref 136–145)
SODIUM SERPL-SCNC: 138 MMOL/L (ref 136–145)
SODIUM UR-SCNC: 107 MMOL/L (ref 20–250)
SODIUM UR-SCNC: 20 MMOL/L (ref 20–250)
SP GR UR STRIP: 1.02 (ref 1–1.03)
SP02: 100
SQUAMOUS #/AREA URNS AUTO: 0 /HPF
TACROLIMUS BLD-MCNC: 3 NG/ML (ref 5–15)
URN SPEC COLLECT METH UR: ABNORMAL
UUN UR-MCNC: 159 MG/DL (ref 140–1050)
VT: 330
WBC # BLD AUTO: 7.23 K/UL (ref 3.9–12.7)
WBC # BLD AUTO: 7.96 K/UL (ref 3.9–12.7)
WBC #/AREA URNS AUTO: 7 /HPF (ref 0–5)

## 2020-09-05 PROCEDURE — 63600175 PHARM REV CODE 636 W HCPCS: Performed by: STUDENT IN AN ORGANIZED HEALTH CARE EDUCATION/TRAINING PROGRAM

## 2020-09-05 PROCEDURE — 63600175 PHARM REV CODE 636 W HCPCS: Mod: JG | Performed by: STUDENT IN AN ORGANIZED HEALTH CARE EDUCATION/TRAINING PROGRAM

## 2020-09-05 PROCEDURE — 84540 ASSAY OF URINE/UREA-N: CPT

## 2020-09-05 PROCEDURE — 63600175 PHARM REV CODE 636 W HCPCS: Mod: JG | Performed by: SURGERY

## 2020-09-05 PROCEDURE — 82803 BLOOD GASES ANY COMBINATION: CPT

## 2020-09-05 PROCEDURE — 63600175 PHARM REV CODE 636 W HCPCS: Performed by: SURGERY

## 2020-09-05 PROCEDURE — 81001 URINALYSIS AUTO W/SCOPE: CPT

## 2020-09-05 PROCEDURE — 85730 THROMBOPLASTIN TIME PARTIAL: CPT

## 2020-09-05 PROCEDURE — P9047 ALBUMIN (HUMAN), 25%, 50ML: HCPCS | Mod: JG | Performed by: STUDENT IN AN ORGANIZED HEALTH CARE EDUCATION/TRAINING PROGRAM

## 2020-09-05 PROCEDURE — 25000003 PHARM REV CODE 250: Performed by: INTERNAL MEDICINE

## 2020-09-05 PROCEDURE — 99222 1ST HOSP IP/OBS MODERATE 55: CPT | Mod: ,,, | Performed by: INTERNAL MEDICINE

## 2020-09-05 PROCEDURE — 82570 ASSAY OF URINE CREATININE: CPT

## 2020-09-05 PROCEDURE — 20000000 HC ICU ROOM

## 2020-09-05 PROCEDURE — 83605 ASSAY OF LACTIC ACID: CPT | Mod: 91

## 2020-09-05 PROCEDURE — 99900026 HC AIRWAY MAINTENANCE (STAT)

## 2020-09-05 PROCEDURE — 36620 INSERTION CATHETER ARTERY: CPT

## 2020-09-05 PROCEDURE — C1751 CATH, INF, PER/CENT/MIDLINE: HCPCS

## 2020-09-05 PROCEDURE — 84300 ASSAY OF URINE SODIUM: CPT

## 2020-09-05 PROCEDURE — 84100 ASSAY OF PHOSPHORUS: CPT

## 2020-09-05 PROCEDURE — 80197 ASSAY OF TACROLIMUS: CPT

## 2020-09-05 PROCEDURE — 36556 INSERT NON-TUNNEL CV CATH: CPT

## 2020-09-05 PROCEDURE — 85610 PROTHROMBIN TIME: CPT | Mod: 91

## 2020-09-05 PROCEDURE — 83605 ASSAY OF LACTIC ACID: CPT

## 2020-09-05 PROCEDURE — 94761 N-INVAS EAR/PLS OXIMETRY MLT: CPT

## 2020-09-05 PROCEDURE — 80053 COMPREHEN METABOLIC PANEL: CPT

## 2020-09-05 PROCEDURE — C9113 INJ PANTOPRAZOLE SODIUM, VIA: HCPCS | Performed by: STUDENT IN AN ORGANIZED HEALTH CARE EDUCATION/TRAINING PROGRAM

## 2020-09-05 PROCEDURE — 94003 VENT MGMT INPAT SUBQ DAY: CPT

## 2020-09-05 PROCEDURE — P9045 ALBUMIN (HUMAN), 5%, 250 ML: HCPCS | Mod: JG

## 2020-09-05 PROCEDURE — P9045 ALBUMIN (HUMAN), 5%, 250 ML: HCPCS | Mod: JG | Performed by: SURGERY

## 2020-09-05 PROCEDURE — 25000003 PHARM REV CODE 250: Performed by: STUDENT IN AN ORGANIZED HEALTH CARE EDUCATION/TRAINING PROGRAM

## 2020-09-05 PROCEDURE — 25000003 PHARM REV CODE 250: Performed by: SURGERY

## 2020-09-05 PROCEDURE — P9045 ALBUMIN (HUMAN), 5%, 250 ML: HCPCS | Mod: JG | Performed by: STUDENT IN AN ORGANIZED HEALTH CARE EDUCATION/TRAINING PROGRAM

## 2020-09-05 PROCEDURE — 99900035 HC TECH TIME PER 15 MIN (STAT)

## 2020-09-05 PROCEDURE — 83735 ASSAY OF MAGNESIUM: CPT

## 2020-09-05 PROCEDURE — 82140 ASSAY OF AMMONIA: CPT

## 2020-09-05 PROCEDURE — 27200188 HC TRANSDUCER, ART ADULT/PEDS

## 2020-09-05 PROCEDURE — 63600175 PHARM REV CODE 636 W HCPCS: Mod: JG

## 2020-09-05 PROCEDURE — U0002 COVID-19 LAB TEST NON-CDC: HCPCS

## 2020-09-05 PROCEDURE — 80069 RENAL FUNCTION PANEL: CPT

## 2020-09-05 PROCEDURE — 99222 PR INITIAL HOSPITAL CARE,LEVL II: ICD-10-PCS | Mod: ,,, | Performed by: INTERNAL MEDICINE

## 2020-09-05 PROCEDURE — 36415 COLL VENOUS BLD VENIPUNCTURE: CPT

## 2020-09-05 PROCEDURE — 99233 PR SUBSEQUENT HOSPITAL CARE,LEVL III: ICD-10-PCS | Mod: ,,, | Performed by: ANESTHESIOLOGY

## 2020-09-05 PROCEDURE — 27000221 HC OXYGEN, UP TO 24 HOURS

## 2020-09-05 PROCEDURE — 99233 SBSQ HOSP IP/OBS HIGH 50: CPT | Mod: ,,, | Performed by: ANESTHESIOLOGY

## 2020-09-05 PROCEDURE — 80076 HEPATIC FUNCTION PANEL: CPT

## 2020-09-05 PROCEDURE — 63600175 PHARM REV CODE 636 W HCPCS: Performed by: INTERNAL MEDICINE

## 2020-09-05 PROCEDURE — 37799 UNLISTED PX VASCULAR SURGERY: CPT

## 2020-09-05 PROCEDURE — 85025 COMPLETE CBC W/AUTO DIFF WBC: CPT | Mod: 91

## 2020-09-05 PROCEDURE — 85610 PROTHROMBIN TIME: CPT

## 2020-09-05 RX ORDER — ALBUMIN HUMAN 50 G/1000ML
12.5 SOLUTION INTRAVENOUS ONCE
Status: COMPLETED | OUTPATIENT
Start: 2020-09-05 | End: 2020-09-05

## 2020-09-05 RX ORDER — HEPARIN SODIUM,PORCINE/D5W 25000/250
12 INTRAVENOUS SOLUTION INTRAVENOUS CONTINUOUS
Status: DISCONTINUED | OUTPATIENT
Start: 2020-09-05 | End: 2020-09-05

## 2020-09-05 RX ORDER — ALBUMIN HUMAN 50 G/1000ML
12.5 SOLUTION INTRAVENOUS ONCE
Status: DISCONTINUED | OUTPATIENT
Start: 2020-09-05 | End: 2020-09-05

## 2020-09-05 RX ORDER — ALBUMIN HUMAN 250 G/1000ML
25 SOLUTION INTRAVENOUS EVERY 8 HOURS
Status: COMPLETED | OUTPATIENT
Start: 2020-09-05 | End: 2020-09-06

## 2020-09-05 RX ORDER — ALBUMIN HUMAN 50 G/1000ML
SOLUTION INTRAVENOUS
Status: COMPLETED
Start: 2020-09-05 | End: 2020-09-05

## 2020-09-05 RX ORDER — POTASSIUM CHLORIDE 29.8 MG/ML
80 INJECTION INTRAVENOUS
Status: DISCONTINUED | OUTPATIENT
Start: 2020-09-05 | End: 2020-09-08

## 2020-09-05 RX ORDER — INDOMETHACIN 25 MG/1
50 CAPSULE ORAL ONCE
Status: COMPLETED | OUTPATIENT
Start: 2020-09-05 | End: 2020-09-05

## 2020-09-05 RX ORDER — MAGNESIUM SULFATE HEPTAHYDRATE 40 MG/ML
4 INJECTION, SOLUTION INTRAVENOUS
Status: DISCONTINUED | OUTPATIENT
Start: 2020-09-05 | End: 2020-09-08

## 2020-09-05 RX ORDER — POTASSIUM CHLORIDE 29.8 MG/ML
40 INJECTION INTRAVENOUS
Status: DISCONTINUED | OUTPATIENT
Start: 2020-09-05 | End: 2020-09-08

## 2020-09-05 RX ORDER — ALBUMIN HUMAN 50 G/1000ML
25 SOLUTION INTRAVENOUS ONCE
Status: COMPLETED | OUTPATIENT
Start: 2020-09-05 | End: 2020-09-05

## 2020-09-05 RX ORDER — HEPARIN SODIUM,PORCINE/D5W 25000/250
12 INTRAVENOUS SOLUTION INTRAVENOUS CONTINUOUS
Status: DISCONTINUED | OUTPATIENT
Start: 2020-09-05 | End: 2020-09-09

## 2020-09-05 RX ORDER — ALBUMIN HUMAN 250 G/1000ML
25 SOLUTION INTRAVENOUS EVERY 8 HOURS
Status: DISCONTINUED | OUTPATIENT
Start: 2020-09-05 | End: 2020-09-05

## 2020-09-05 RX ORDER — MAGNESIUM SULFATE HEPTAHYDRATE 40 MG/ML
2 INJECTION, SOLUTION INTRAVENOUS
Status: DISCONTINUED | OUTPATIENT
Start: 2020-09-05 | End: 2020-09-08

## 2020-09-05 RX ORDER — SODIUM BICARBONATE 1 MEQ/ML
SYRINGE (ML) INTRAVENOUS
Status: DISPENSED
Start: 2020-09-05 | End: 2020-09-06

## 2020-09-05 RX ORDER — POTASSIUM CHLORIDE 14.9 MG/ML
60 INJECTION INTRAVENOUS
Status: DISCONTINUED | OUTPATIENT
Start: 2020-09-05 | End: 2020-09-08

## 2020-09-05 RX ADMIN — ALBUMIN (HUMAN) 25 G: 12.5 SOLUTION INTRAVENOUS at 05:09

## 2020-09-05 RX ADMIN — DEXTROSE MONOHYDRATE 12.5 G: 25 INJECTION, SOLUTION INTRAVENOUS at 04:09

## 2020-09-05 RX ADMIN — PROPOFOL 40 MCG/KG/MIN: 10 INJECTION, EMULSION INTRAVENOUS at 07:09

## 2020-09-05 RX ADMIN — SODIUM CHLORIDE, SODIUM LACTATE, POTASSIUM CHLORIDE, AND CALCIUM CHLORIDE 1000 ML: .6; .31; .03; .02 INJECTION, SOLUTION INTRAVENOUS at 03:09

## 2020-09-05 RX ADMIN — HEPARIN SODIUM AND DEXTROSE 12 UNITS/KG/HR: 10000; 5 INJECTION INTRAVENOUS at 10:09

## 2020-09-05 RX ADMIN — PANTOPRAZOLE SODIUM 40 MG: 40 INJECTION, POWDER, LYOPHILIZED, FOR SOLUTION INTRAVENOUS at 08:09

## 2020-09-05 RX ADMIN — ALBUMIN (HUMAN) 25 G: 12.5 SOLUTION INTRAVENOUS at 09:09

## 2020-09-05 RX ADMIN — PIPERACILLIN SODIUM AND TAZOBACTAM SODIUM 4.5 G: 4; .5 INJECTION, POWDER, LYOPHILIZED, FOR SOLUTION INTRAVENOUS at 01:09

## 2020-09-05 RX ADMIN — MAGNESIUM SULFATE IN WATER 4 G: 40 INJECTION, SOLUTION INTRAVENOUS at 05:09

## 2020-09-05 RX ADMIN — NITROGLYCERIN 0.5 INCH: 20 OINTMENT TOPICAL at 11:09

## 2020-09-05 RX ADMIN — HYDROCORTISONE SODIUM SUCCINATE 100 MG: 100 INJECTION, POWDER, FOR SOLUTION INTRAMUSCULAR; INTRAVENOUS at 09:09

## 2020-09-05 RX ADMIN — PIPERACILLIN SODIUM AND TAZOBACTAM SODIUM 4.5 G: 4; .5 INJECTION, POWDER, LYOPHILIZED, FOR SOLUTION INTRAVENOUS at 09:09

## 2020-09-05 RX ADMIN — ALBUMIN HUMAN 12.5 G: 50 SOLUTION INTRAVENOUS at 03:09

## 2020-09-05 RX ADMIN — DEXTROSE MONOHYDRATE 12.5 G: 25 INJECTION, SOLUTION INTRAVENOUS at 11:09

## 2020-09-05 RX ADMIN — ALBUMIN (HUMAN) 12.5 G: 12.5 SOLUTION INTRAVENOUS at 02:09

## 2020-09-05 RX ADMIN — ALBUMIN (HUMAN) 12.5 G: 12.5 SOLUTION INTRAVENOUS at 11:09

## 2020-09-05 RX ADMIN — Medication 50 MCG/HR: at 08:09

## 2020-09-05 RX ADMIN — PROPOFOL 40 MCG/KG/MIN: 10 INJECTION, EMULSION INTRAVENOUS at 11:09

## 2020-09-05 RX ADMIN — SODIUM CHLORIDE, SODIUM LACTATE, POTASSIUM CHLORIDE, AND CALCIUM CHLORIDE: .6; .31; .03; .02 INJECTION, SOLUTION INTRAVENOUS at 04:09

## 2020-09-05 RX ADMIN — Medication 100 MCG/HR: at 05:09

## 2020-09-05 RX ADMIN — PIPERACILLIN SODIUM AND TAZOBACTAM SODIUM 4.5 G: 4; .5 INJECTION, POWDER, LYOPHILIZED, FOR SOLUTION INTRAVENOUS at 04:09

## 2020-09-05 RX ADMIN — HYDROCORTISONE SODIUM SUCCINATE 100 MG: 100 INJECTION, POWDER, FOR SOLUTION INTRAMUSCULAR; INTRAVENOUS at 01:09

## 2020-09-05 RX ADMIN — PROPOFOL 35 MCG/KG/MIN: 10 INJECTION, EMULSION INTRAVENOUS at 03:09

## 2020-09-05 RX ADMIN — ALBUMIN (HUMAN) 12.5 G: 12.5 SOLUTION INTRAVENOUS at 03:09

## 2020-09-05 RX ADMIN — ALBUMIN (HUMAN) 25 G: 12.5 SOLUTION INTRAVENOUS at 10:09

## 2020-09-05 RX ADMIN — POTASSIUM CHLORIDE 40 MEQ: 29.8 INJECTION, SOLUTION INTRAVENOUS at 05:09

## 2020-09-05 RX ADMIN — NITROGLYCERIN 0.5 INCH: 20 OINTMENT TOPICAL at 05:09

## 2020-09-05 RX ADMIN — SODIUM CHLORIDE, SODIUM LACTATE, POTASSIUM CHLORIDE, AND CALCIUM CHLORIDE: .6; .31; .03; .02 INJECTION, SOLUTION INTRAVENOUS at 11:09

## 2020-09-05 RX ADMIN — SODIUM BICARBONATE 50 MEQ: 84 INJECTION, SOLUTION INTRAVENOUS at 01:09

## 2020-09-05 RX ADMIN — VASOPRESSIN 0.02 UNITS/MIN: 20 INJECTION INTRAVENOUS at 04:09

## 2020-09-05 NOTE — SUBJECTIVE & OBJECTIVE
Review of patient's allergies indicates:   Allergen Reactions    Codeine Itching     Other reaction(s): Itching    Lipitor [atorvastatin] Other (See Comments)     Other reaction(s): Muscle pain  Muscle cranmps    Morphine Itching     Other reaction(s): nausea and vomiting     Zoloft [sertraline] Other (See Comments)     Tremors/muscle spasms     Current Facility-Administered Medications   Medication Frequency    0.9%  NaCl infusion (for blood administration) Q24H PRN    0.9%  NaCl infusion (for blood administration) Q24H PRN    0.9%  NaCl infusion (for blood administration) Q24H PRN    albumin human 25% bottle 25 g Q8H    calcium gluconate 1g in dextrose 5% 100mL (ready to mix system) PRN    calcium gluconate 2 g in dextrose 5 % 100 mL IVPB PRN    calcium gluconate 3 g in dextrose 5 % 100 mL IVPB PRN    dextrose 50% injection 12.5 g PRN    dextrose 50% injection 25 g PRN    fentaNYL 2500 mcg in 0.9% sodium chloride 250 mL infusion premix (titrating) Continuous    fentaNYL injection 25 mcg Q1H PRN    glucagon (human recombinant) injection 1 mg PRN    glucose chewable tablet 16 g PRN    glucose chewable tablet 24 g PRN    hydrocortisone sodium succinate injection 100 mg Q8H    lactated ringers infusion Continuous    magnesium sulfate 2g in water 50mL IVPB (premix) PRN    magnesium sulfate 2g in water 50mL IVPB (premix) PRN    melatonin tablet 6 mg Nightly PRN    nitroGLYCERIN 2% TD oint ointment 0.5 inch Q6H    norepinephrine 16 mg in dextrose 5 % 250 mL infusion Continuous    ondansetron injection 4 mg Q8H PRN    oxyCODONE immediate release tablet 5 mg Q6H PRN    pantoprazole injection 40 mg Daily    piperacillin-tazobactam 4.5 g in sodium chloride 0.9% 100 mL IVPB (ready to mix system) Q8H    potassium chloride 40 mEq in 100 mL IVPB (FOR CENTRAL LINE ADMINISTRATION ONLY) PRN    And    potassium chloride 20 mEq in 100 mL IVPB (FOR CENTRAL LINE ADMINISTRATION ONLY) PRN    And     potassium chloride 40 mEq in 100 mL IVPB (FOR CENTRAL LINE ADMINISTRATION ONLY) PRN    promethazine tablet 25 mg Q6H PRN    propofol (DIPRIVAN) 10 mg/mL infusion Continuous    sodium bicarbonate 8.4 % (1 mEq/mL) injection     sodium chloride 0.9% flush 10 mL PRN    sodium chloride 0.9% flush 10 mL PRN    sodium phosphate 15 mmol in dextrose 5 % 250 mL IVPB PRN    sodium phosphate 20.01 mmol in dextrose 5 % 250 mL IVPB PRN    sodium phosphate 30 mmol in dextrose 5 % 250 mL IVPB PRN    vasopressin (PITRESSIN) 0.2 Units/mL in dextrose 5 % 100 mL infusion Continuous       Objective:     Vital Signs (Most Recent):  Temp: 98.9 °F (37.2 °C) (09/05/20 1110)  Pulse: 110 (09/05/20 1445)  Resp: (!) 22 (09/05/20 1110)  BP: (!) 118/55 (09/05/20 1400)  SpO2: 100 % (09/05/20 1445)  O2 Device (Oxygen Therapy): ventilator (09/05/20 1400) Vital Signs (24h Range):  Temp:  [97.5 °F (36.4 °C)-98.9 °F (37.2 °C)] 98.9 °F (37.2 °C)  Pulse:  [] 110  Resp:  [0-25] 22  SpO2:  [91 %-100 %] 100 %  BP: ()/(35-83) 118/55  Arterial Line BP: ()/(37-93) 167/49     Weight: 56 kg (123 lb 7.3 oz) (09/04/20 1905)  Body mass index is 24.94 kg/m².  Body surface area is 1.53 meters squared.    I/O last 3 completed shifts:  In: 80736.8 [I.V.:4257.8; IV Piggyback:6200]  Out: 2766 [Urine:1068; Drains:448; Other:1250]    Physical Exam  Vitals signs reviewed.   Constitutional:       Appearance: She is normal weight.      Comments: sedated   HENT:      Head: Normocephalic and atraumatic.      Mouth/Throat:      Comments: ET on MV  Neck:      Comments: RIJ central line  Cardiovascular:      Rate and Rhythm: Regular rhythm. Tachycardia present.      Heart sounds: Normal heart sounds. No murmur.   Pulmonary:      Breath sounds: Normal breath sounds. No wheezing or rhonchi.      Comments: Intubated and mechanically ventilated  Abdominal:      Comments: G-tube.  Wound vac         Significant Labs:  ABGs:   Recent Labs   Lab 09/05/20  1200    PH 7.262*   PCO2 35.9   HCO3 16.2*   POCSATURATED 75*   BE -11     CBC:   Recent Labs   Lab 09/05/20 0358   WBC 7.96   RBC 3.24*   HGB 9.0*   HCT 27.6*      MCV 85   MCH 27.8   MCHC 32.6     CMP:   Recent Labs   Lab 09/05/20 0358   GLU 66*   CALCIUM 6.7*   ALBUMIN 1.0*   PROT 2.9*      K 3.5   CO2 15*   *   BUN 26*   CREATININE 1.6*   ALKPHOS 96   ALT 77*   *   BILITOT 0.7     All labs within the past 24 hours have been reviewed.

## 2020-09-05 NOTE — HOSPITAL COURSE
9/4: Pt admitted to SICU following ex lap for GI perf. Intubated, sedated. Wound vac in place.  Soon after arrival to unit a mottled appearance to RUE was noted.  Vascular consulted and US revealed R radial artery thrombosis.    9/5: Tachycardic..  R arm appearance greatly improved overnight.    9/6: Patient returned to OR for abdominal washout, closure. R arm with dopplerable signals to ulnar artery and palmar arch.   9/7: Attempted to wean patient off of sedation in an effort to move towards extubation. Patient weaned off of propofol. Precedex started. Patient's heart rate very labile and sensitize to sedation.   9/8: patient extubated  9/9: SHANICE. Labetalol PRN for HTN   9/10:  Diuresis increased.  Cr downtrending  9/11: Increased abdominal distension and elevated WBC count. CT abd  9/12: WBC to 45 today. Diflucan and zosyn initiated. Lactulose enema w/ improving mental status  9/13: WBC improved. Mental status improving. FWF initiated for hypernatremia.   9/14: White count continues to improve.  Oriented to person only this morning.    9/17: tolerating tube feeds, half TPN rate today. Possible step down   9/18: possible fall out of bed overnight. High G tube residual, now to gravity. Requiring comfortflo  9/19: s/p exlap. Intubated ans sedated. Weaning to extubate  9/20: No acute events overnight. Pt remains intubated, sedated. Plan to wean sedation for extubation.  9/21: Abdominal cultures grew Klebsiella; pt on zosyn  9/22: Pt did well with SBT yesterday; back on rate overnight.  Will try again today with hopes to extubate  9/23: Extubated yesterday; doing well with nasal canula  9/24: remains on 3L NC. Diuresed yesterday w/ 240mg Lasix. Continue diuresis today with 40 Lasix BID  9/25: Blown L pupil / LUE weakness upon exam. CT head performed. CT abd/pelvis ordered.  9/26: Remains intubated. Pressure soft and H/H noted to be 6. Fluid resuscitation initiated. A-line not reading or drawing back.  9/27: Intubated.  Ventilator settings weaned minimally. Hypotensive throughout the day. Continued fluid resuscitation.   9/28 - goals of care discussion. Now DNR. Continue medical management. Plan for repeat GoC if pressor requirement increases.   9/29 - pt extubated.  Returned to full code per husbands wishes

## 2020-09-05 NOTE — ANESTHESIA POSTPROCEDURE EVALUATION
Anesthesia Post Evaluation    Patient: Elda Hernandez    Procedure(s) Performed: Procedure(s) (LRB):  LAPAROTOMY, EXPLORATORY (N/A)    Final Anesthesia Type: general    Patient location during evaluation: ICU  Patient participation: No - Unable to Participate, Intubation  Level of consciousness: sedated  Post-procedure vital signs: reviewed and stable  Pain management: adequate  Airway patency: patent    PONV status at discharge: No PONV  Anesthetic complications: no      Cardiovascular status: hemodynamically stable (on norepinephrine and vasopressin infusions)  Respiratory status: ETT and ventilator  Hydration status: euvolemic  Follow-up not needed.          Vitals Value Taken Time   /46 09/04/20 2102   Temp 36.4 °C (97.5 °F) 09/04/20 1905   Pulse 102 09/04/20 2157   Resp 0 09/04/20 2157   SpO2 98 % 09/04/20 2157   Vitals shown include unvalidated device data.      No case tracking events are documented in the log.      Pain/Ector Score: Pain Rating Prior to Med Admin: 5 (9/4/2020  2:19 PM)  Pain Rating Post Med Admin: 2 (9/3/2020  8:00 AM)  Modified Ector Score: 16 (9/4/2020 12:00 AM)

## 2020-09-05 NOTE — PLAN OF CARE
"   SICU PLAN OF CARE NOTE    Dx: Perforated abdominal viscus    Shift Events: pt BP very labile all night, on and off pressors, when off, not off for long at all. Received a total of 2L LR boluses on my shift. Main concern of the night was UOP trended down from 75cc/hr at beginning of shift to 15-30cc/hr range the rest of the night, MD was made aware at the first time of drop, no new orders, says she has and JACQUE, and the reason she put out more on day shift was because of receiving 7L of boluses. After the x2 boluses I gave throughout the night, her UOP would only be 35cc/hr however. Continued to make him aware of low UOP, no new orders. ST all night 100-120s. Pt very cold at beginning of shift, placed bear hugger on pt as I was also having issues palpating pulses, even difficult on doppler. Once pt was warmed, able to doppler pulses. US tech came by around 2300 last night and did US of RUE d/t episode on day shift of pt arm becoming swollen and mottled where old A line was in place. The "mottled" look spread to arms and legs. Appears much better now. When US tech was doing her exam, I heard strong pulses then as well. No official result yet on exam.     Goals of Care: comfort, hemodynamic stability, adequate oxygenation, antibiotics, electrolyte/fluid balance.     Neuro: Sedated, Arouses to Voice, Follows Commands, and Moves All Extremities    Vital Signs: BP (!) 89/51   Pulse (!) 117   Temp 98.5 °F (36.9 °C) (Oral)   Resp (!) 0   Ht 4' 11" (1.499 m)   Wt 56 kg (123 lb 7.3 oz)   SpO2 (!) 91%   BMI 24.94 kg/m²     Respiratory: Ventilator    Diet: NPO    Gtts: Propfol, Fentanyl, Vasopressin, MIVF, and Abx    Urine Output: Urinary Catheter 325 cc/shift    Drains: KERRY Drain, total output 50 cc / shift, G-tube/J-tube, total output 200 cc /  shift, and NG/OG Tube 50 cc /  shift    Wound Vac WVAC to open abd, @ 125. Put out about 1000cc serousang drainage. Seal intact, no alarms or issues.      Accuchecks Q4. " --Running low sugars. Had to push 12.5gm Dextrose IVP for BG of 69 this am. Responded well with recheck at 101.    Skin: skin intact other than incisions and drains. Unable to assess sacrum at this time d/t inability to turn pt d/t open belly.

## 2020-09-05 NOTE — SUBJECTIVE & OBJECTIVE
Interval History/Significant Events: NAEO.  Pt remains intubated and sedated.      Follow-up For: Procedure(s) (LRB):  LAPAROTOMY, EXPLORATORY (N/A)    Post-Operative Day: 1 Day Post-Op    Objective:     Vital Signs (Most Recent):  Temp: 98.5 °F (36.9 °C) (09/05/20 0305)  Pulse: (!) 117 (09/05/20 0600)  Resp: (!) 0 (09/05/20 0600)  BP: (!) 89/51 (09/05/20 0600)  SpO2: (!) 91 % (09/05/20 0600) Vital Signs (24h Range):  Temp:  [97.5 °F (36.4 °C)-98.6 °F (37 °C)] 98.5 °F (36.9 °C)  Pulse:  [] 117  Resp:  [0-34] 0  SpO2:  [91 %-100 %] 91 %  BP: ()/(44-83) 89/51  Arterial Line BP: ()/(37-93) 143/48     Weight: 56 kg (123 lb 7.3 oz)  Body mass index is 24.94 kg/m².      Intake/Output Summary (Last 24 hours) at 9/5/2020 0710  Last data filed at 9/5/2020 0612  Gross per 24 hour   Intake 50675.77 ml   Output 2625 ml   Net 7832.77 ml       Physical Exam  Vitals signs reviewed.   Constitutional:       Comments: sedated   HENT:      Head: Normocephalic and atraumatic.      Mouth/Throat:      Comments: intubated  Neck:      Comments: RIJ central line  Cardiovascular:      Rate and Rhythm: Regular rhythm. Tachycardia present.      Heart sounds: Normal heart sounds. No murmur.   Pulmonary:      Breath sounds: Normal breath sounds. No wheezing or rhonchi.      Comments: Intubated and mechanically ventilated  Abdominal:      Comments: G-tube.  Wound vac   Skin:     Comments: Slightly pale, mottled appearance most apparent in R arm and groin.  Considerably improved from yesterday         Vents:  Vent Mode: A/C (09/05/20 0425)  Ventilator Initiated: Yes (09/04/20 1130)  Set Rate: 24 BPM (09/05/20 0425)  Vt Set: 330 mL (09/05/20 0425)  PEEP/CPAP: 5 cmH20 (09/05/20 0425)  Oxygen Concentration (%): 50 (09/05/20 0425)  Peak Airway Pressure: 19 cmH2O (09/05/20 0425)  Total Ve: 8.01 mL (09/05/20 0425)  F/VT Ratio<105 (RSBI): (!) 71.64 (09/05/20 0425)    Lines/Drains/Airways     Central Venous Catheter Line             Percutaneous Central Line Insertion/Assessment - Triple Lumen  09/04/20 0800 right internal jugular less than 1 day          Drain                 Gastrostomy/Enterostomy 09/04/20 LUQ decompression 1 day         NG/OG Tube 09/04/20 Left nostril 1 day         Closed/Suction Drain 09/04/20 1301 Abdomen Bulb 19 Fr. less than 1 day         Closed/Suction Drain 09/04/20 1302 Abdomen Bulb 19 Fr. less than 1 day         Urethral Catheter 09/04/20 1300 Non-latex;Straight-tip 18 Fr. less than 1 day          Airway                 Airway - Non-Surgical 09/04/20 1122 Endotracheal Tube less than 1 day          Arterial Line            Arterial Line 09/04/20 1819 Left Femoral less than 1 day          Peripheral Intravenous Line                 Peripheral IV - Single Lumen Right Forearm -- days                Significant Labs:    CBC/Anemia Profile:  Recent Labs   Lab 09/04/20  0734  09/04/20  1257 09/04/20  1335 09/05/20  0358   WBC 7.29  --   --  8.19 7.96   HGB 11.0*  --   --  10.6* 9.0*   HCT 34.8*   < > 18* 33.4* 27.6*     --   --  239 158   MCV 87  --   --  86 85   RDW 14.5  --   --  14.6* 14.9*    < > = values in this interval not displayed.        Chemistries:  Recent Labs   Lab 09/04/20  0734 09/04/20  0929 09/04/20  1335 09/05/20  0358   * 133* 137 138   K 3.8 3.2* 4.2 3.5    111* 112* 115*   CO2 12* 16* 16* 15*   BUN 34* 32* 28* 26*   CREATININE 2.6* 2.1* 1.8* 1.6*   CALCIUM 6.8* 6.9* 7.9* 6.7*   ALBUMIN 1.5* 1.5* 1.3* 1.0*   PROT 4.0* 3.8* 3.4* 2.9*   BILITOT 0.7 0.7 0.5 0.7   ALKPHOS 153* 146* 121 96   ALT 14 30 59* 77*   AST 38 56* 114* 118*   MG 1.1*  --  1.9 1.2*   PHOS 2.9  --  2.9 3.4       All pertinent labs within the past 24 hours have been reviewed.    Significant Imaging:  I have reviewed all pertinent imaging results/findings within the past 24 hours.

## 2020-09-05 NOTE — SUBJECTIVE & OBJECTIVE
Interval History: Patient emergently taken to the OR yesterday for evidence of free air on CT. Intraop found to have 2L of succus in the abdomen and a 3cm perforation int he proximal anterior wall of the stomach toward the greater curvature. This was primarily closed. A drain was left in place. Fascia was left open and an Abthera wound vac was placed.     On yesterday she was found to have ischemia of the right hand thought to be due to a thrombus in the radial artery where the chloé was placed. Vascular surgery consulted. recommended removed chloé and applying nitropaste.     On BP very labile requiring pressors. Received 2L boluses of LR. Generalized mottling improved.     Medications:  Continuous Infusions:   fentanyl 75 mcg/hr (09/05/20 0800)    lactated ringers 150 mL/hr at 09/05/20 0600    norepinephrine bitartrate-D5W 0.02 mcg/kg/min (09/05/20 0835)    propofoL 40 mcg/kg/min (09/05/20 0800)    vasopressin (PITRESSIN) infusion 0.02 Units/min (09/05/20 0832)     Scheduled Meds:   pantoprazole  40 mg Intravenous Daily    piperacillin-tazobactam (ZOSYN) IVPB  4.5 g Intravenous Q8H     PRN Meds:sodium chloride, sodium chloride, sodium chloride, calcium gluconate IVPB, calcium gluconate IVPB, calcium gluconate IVPB, dextrose 50%, dextrose 50%, fentaNYL, glucagon (human recombinant), glucose, glucose, magnesium sulfate IVPB, magnesium sulfate IVPB, melatonin, ondansetron, oxyCODONE, potassium chloride in water **AND** potassium chloride in water **AND** potassium chloride in water, promethazine, sodium chloride 0.9%, sodium chloride 0.9%, sodium phosphate IVPB, sodium phosphate IVPB, sodium phosphate IVPB     Review of patient's allergies indicates:   Allergen Reactions    Codeine Itching     Other reaction(s): Itching    Lipitor [atorvastatin] Other (See Comments)     Other reaction(s): Muscle pain  Muscle cranmps    Morphine Itching     Other reaction(s): nausea and vomiting     Zoloft [sertraline]  Other (See Comments)     Tremors/muscle spasms     Objective:     Vital Signs (Most Recent):  Temp: 98.4 °F (36.9 °C) (09/05/20 0715)  Pulse: (!) 116 (09/05/20 0820)  Resp: (!) 24 (09/05/20 0820)  BP: (!) 104/46 (09/05/20 0700)  SpO2: (!) 92 % (09/05/20 0820) Vital Signs (24h Range):  Temp:  [97.5 °F (36.4 °C)-98.6 °F (37 °C)] 98.4 °F (36.9 °C)  Pulse:  [] 116  Resp:  [0-34] 24  SpO2:  [91 %-100 %] 92 %  BP: ()/(44-83) 104/46  Arterial Line BP: ()/(37-93) 187/59     Weight: 56 kg (123 lb 7.3 oz)  Body mass index is 24.94 kg/m².    Intake/Output - Last 3 Shifts       09/03 0700 - 09/04 0659 09/04 0700 - 09/05 0659 09/05 0700 - 09/06 0659    I.V. (mL/kg)  4257.8 (76)     IV Piggyback  6200     Total Intake(mL/kg)  19694.8 (186.7)     Urine (mL/kg/hr)  1045 (0.8) 48 (0.5)    Drains  330 118    Other  1250     Total Output  2625 166    Net  +7832.8 -166           Stool Occurrence 0 x            Physical Exam  Constitutional:       Comments: Intubated/Sedated   HENT:      Head: Normocephalic and atraumatic.   Cardiovascular:      Rate and Rhythm: Normal rate and regular rhythm.   Pulmonary:      Effort: Pulmonary effort is normal. No respiratory distress.      Comments: Vent Mode: A/C  Oxygen Concentration (%):  (50-60) 50  Resp Rate Total:  (24 br/min) 24 br/min  Vt Set:  (330 mL) 330 mL  PEEP/CPAP:  (3 cmH20-5 cmH20) 5 cmH20  Mean Airway Pressure:  (6.8 cmH20-9.2 cmH20) 9.2 cmH20  Abdominal:      Palpations: Abdomen is soft.      Comments: ABThera wound vac in place with serous output   Skin:     General: Skin is warm and dry.      Comments: Mottling improving   Neurological:      Comments: Sedated         Significant Labs:  CBC:   Recent Labs   Lab 09/05/20 0358   WBC 7.96   RBC 3.24*   HGB 9.0*   HCT 27.6*      MCV 85   MCH 27.8   MCHC 32.6     CMP:   Recent Labs   Lab 09/05/20 0358   GLU 66*   CALCIUM 6.7*   ALBUMIN 1.0*   PROT 2.9*      K 3.5   CO2 15*   *   BUN 26*    CREATININE 1.6*   ALKPHOS 96   ALT 77*   *   BILITOT 0.7     LFTs:   Recent Labs   Lab 09/05/20  0358   ALT 77*   *   ALKPHOS 96   BILITOT 0.7   PROT 2.9*   ALBUMIN 1.0*     Coagulation:   Recent Labs   Lab 09/04/20  1335 09/05/20  0358   LABPROT 14.6* 16.7*  16.7*   INR 1.3* 1.5*  1.5*   APTT 33.0*  --        Significant Diagnostics:  I have reviewed all pertinent imaging results/findings within the past 24 hours.

## 2020-09-05 NOTE — PROGRESS NOTES
Notified team about right arm becoming mottled, patient has good cap refill, vascular consulted, to bedside, advised to remove right radial A-line.

## 2020-09-05 NOTE — ASSESSMENT & PLAN NOTE
Neuro:  -sedation: propofol gtt  -analgesia: fentanyl gtt      CV:  - Vascular surgery consulted for R radial artery occlusion; appreciate recs.  Will discuss anticoagulation with staff today  - requiring decreasing pressor support.  Vaso drip continues, wean as tolerated  - titrate to MAP>60     Pulm:  - intubated, mechanically ventilated.       FEN/GI: Perforated stomach; s/p washout and patch (9/4)   - s/p washout and GI perf repair 9/4.    - plan for return to OR 9/6  - Fluid bolus upon arrival  - Cont Zosyn  - Pantoprazole 40mg IV daily  - Daily metabolic panel with PRN repletion of electrolytes     Renal: Pt with JACQUE upon arrival; BUN/Cr: 32/2.1 (9/4)  - JACQUE improving; BUN/Cr 26/1.6  -   - Continue to monitor with daily metabolic labs     HEME/ID: Pt septic and given several L of fluid upon arrival to SICU  - trend lactate daily  - cont zosyn  - Hb - 9.0  - daily CBC  - s/p Liver transplant in 2002; holding immunosuppressants for now per hepatology team.  Daily tacrolimus level with AM labs     Endo:  - Hx of hypothyroidism; not taking synthroid at home

## 2020-09-05 NOTE — PROGRESS NOTES
[facility]  Vascular Surgery  Progress Note    Patient Name: Elda Hernandez  MRN: [id number]  Admission Date: 9/3/2020  Primary Care Provider: Jordana Woods MD    Subjective:     Interval History: Clinically similar to yesterday.  R hand better on exam.  Art line now in groin.    Post-Op Info:  Procedure(s) (LRB):  LAPAROTOMY, EXPLORATORY (N/A)   1 Day Post-Op       Medications:  Continuous Infusions:   fentanyl 2,500 mcg/hr (09/05/20 0932)    lactated ringers 150 mL/hr (09/05/20 0900)    norepinephrine bitartrate-D5W Stopped (09/05/20 0931)    propofoL 40 mcg/kg/min (09/05/20 0921)    vasopressin (PITRESSIN) infusion 0.02 Units/min (09/05/20 1034)     Scheduled Meds:   albumin human 5%  12.5 g Intravenous Once    hydrocortisone sodium succinate  100 mg Intravenous Q8H    nitroGLYCERIN 2% TD oint  0.5 inch Topical (Top) Q6H    pantoprazole  40 mg Intravenous Daily    piperacillin-tazobactam (ZOSYN) IVPB  4.5 g Intravenous Q8H     PRN Meds:sodium chloride, sodium chloride, sodium chloride, calcium gluconate IVPB, calcium gluconate IVPB, calcium gluconate IVPB, dextrose 50%, dextrose 50%, fentaNYL, glucagon (human recombinant), glucose, glucose, magnesium sulfate IVPB, magnesium sulfate IVPB, melatonin, ondansetron, oxyCODONE, potassium chloride in water **AND** potassium chloride in water **AND** potassium chloride in water, promethazine, sodium chloride 0.9%, sodium chloride 0.9%, sodium phosphate IVPB, sodium phosphate IVPB, sodium phosphate IVPB     Objective:     Vital Signs (Most Recent):  Temp: 98.4 °F (36.9 °C) (09/05/20 0715)  Pulse: 100 (09/05/20 0915)  Resp: (!) 24 (09/05/20 0820)  BP: 128/66 (09/05/20 0845)  SpO2: 95 % (09/05/20 0915) Vital Signs (24h Range):  Temp:  [97.5 °F (36.4 °C)-98.6 °F (37 °C)] 98.4 °F (36.9 °C)  Pulse:  [] 100  Resp:  [0-25] 24  SpO2:  [91 %-100 %] 95 %  BP: ()/(43-83) 128/66  Arterial Line BP: ()/(37-93) 155/50     Date 09/05/20  0700 - 09/06/20 0659   Shift 8768-9012 1720-5657 0489-0941 24 Hour Total   INTAKE   Shift Total(mL/kg)       OUTPUT   Urine(mL/kg/hr) 48   48   Drains 118   118   Shift Total(mL/kg) 166(3)   166(3)   Weight (kg) 56 56 56 56       Physical Exam  Vitals signs reviewed.   Constitutional:       Comments: sedated   HENT:      Head: Normocephalic and atraumatic.      Mouth/Throat:      Comments: intubated  Neck:      Comments: RIJ central line  Cardiovascular:      Rate and Rhythm: Regular rhythm. Tachycardia present.      Heart sounds: Normal heart sounds. No murmur.   Pulmonary:      Breath sounds: Normal breath sounds. No wheezing or rhonchi.      Comments: Intubated and mechanically ventilated  Abdominal:      Comments: G-tube.  Wound vac   Skin:     Comments: R arm and both legs looking much better since yesterday.  A-line has been removed and placed in groin.         Significant Labs:  CBC:   Recent Labs   Lab 09/05/20  0358   WBC 7.96   RBC 3.24*   HGB 9.0*   HCT 27.6*      MCV 85   MCH 27.8   MCHC 32.6     CMP:   Recent Labs   Lab 09/05/20  0358   GLU 66*   CALCIUM 6.7*   ALBUMIN 1.0*   PROT 2.9*      K 3.5   CO2 15*   *   BUN 26*   CREATININE 1.6*   ALKPHOS 96   ALT 77*   *   BILITOT 0.7       Significant Diagnostics:  I have reviewed all pertinent imaging results/findings within the past 24 hours.    Assessment/Plan:     Ischemia of right upper extremity  50 year old female with sepsis from perforated gastric ulcer, concern for ischemic right hand.  Appears radial artery is thrombosed on bedside ultrasound.  Severe vessel spasm on bedside ultrasound bilaterally.  Mottling is now global, more likely sepsis related    -RUE U/S not read yet, but looks to be clot in radial and an open ulnar artery  -Would recommend heparin gtt, no bolus needed, but we recognize this may not be possible given recent surgery and open abdomen  -Warm hands and feet actively  -recommend nitropaste to R hand if  pressure can tollerate         Antony Portillo MD  Vascular Surgery  [facility]

## 2020-09-05 NOTE — ASSESSMENT & PLAN NOTE
50 y.o. female w/ free air on CT scan. S/p emergent ex lap w/ findings of gastric perforation. Closed primarily. ABTthera in place.    Continue SICU care  Will RTOR on 9/6. Need to obtain consent. Covid test negative 9/4  Wean pressors as tolerated  Vascular surgery consulted for ischemia of the right hand. Improving.     Remains critically ill in the SICU

## 2020-09-05 NOTE — PROGRESS NOTES
[facility]  Critical Care - Surgery  Progress Note    Patient Name: Elda Hernandez  MRN: [id number]  Admission Date: 9/3/2020  Hospital Length of Stay: 2 days  Code Status: Full Code  Attending Provider: Clark Rodriguez MD  Primary Care Provider: Jordana Woods MD   Principal Problem: Perforated abdominal viscus    Subjective:     Hospital/ICU Course:  9/4: Pt admitted to SICU following ex lap for GI perf. Intubated, sedated. Wound vac in place.  Soon after arrival to unit a mottled appearance to [facility] was noted.  Vascular consulted and US revealed R radial artery thrombosis.    9/5: Tachycardic..  R arm appearance greatly improved overnight.      Interval History/Significant Events: NAEO.  Pt remains intubated and sedated.      Follow-up For: Procedure(s) (LRB):  LAPAROTOMY, EXPLORATORY (N/A)    Post-Operative Day: 1 Day Post-Op    Objective:     Vital Signs (Most Recent):  Temp: 98.5 °F (36.9 °C) (09/05/20 0305)  Pulse: (!) 117 (09/05/20 0600)  Resp: (!) 0 (09/05/20 0600)  BP: (!) 89/51 (09/05/20 0600)  SpO2: (!) 91 % (09/05/20 0600) Vital Signs (24h Range):  Temp:  [97.5 °F (36.4 °C)-98.6 °F (37 °C)] 98.5 °F (36.9 °C)  Pulse:  [] 117  Resp:  [0-34] 0  SpO2:  [91 %-100 %] 91 %  BP: ()/(44-83) 89/51  Arterial Line BP: ()/(37-93) 143/48     Weight: 56 kg (123 lb 7.3 oz)  Body mass index is 24.94 kg/m².      Intake/Output Summary (Last 24 hours) at 9/5/2020 0710  Last data filed at 9/5/2020 0612  Gross per 24 hour   Intake 45613.77 ml   Output 2625 ml   Net 7832.77 ml       Physical Exam  Vitals signs reviewed.   Constitutional:       Comments: sedated   HENT:      Head: Normocephalic and atraumatic.      Mouth/Throat:      Comments: intubated  Neck:      Comments: RIJ central line  Cardiovascular:      Rate and Rhythm: Regular rhythm. Tachycardia present.      Heart sounds: Normal heart sounds. No murmur.   Pulmonary:      Breath sounds: Normal breath sounds. No wheezing or  rhonchi.      Comments: Intubated and mechanically ventilated  Abdominal:      Comments: G-tube.  Wound vac   Skin:     Comments: Slightly pale, mottled appearance most apparent in R arm and groin.  Considerably improved from yesterday         Vents:  Vent Mode: A/C (09/05/20 0425)  Ventilator Initiated: Yes (09/04/20 1130)  Set Rate: 24 BPM (09/05/20 0425)  Vt Set: 330 mL (09/05/20 0425)  PEEP/CPAP: 5 cmH20 (09/05/20 0425)  Oxygen Concentration (%): 50 (09/05/20 0425)  Peak Airway Pressure: 19 cmH2O (09/05/20 0425)  Total Ve: 8.01 mL (09/05/20 0425)  F/VT Ratio<105 (RSBI): (!) 71.64 (09/05/20 0425)    Lines/Drains/Airways     Central Venous Catheter Line            Percutaneous Central Line Insertion/Assessment - Triple Lumen  09/04/20 0800 right internal jugular less than 1 day          Drain                 Gastrostomy/Enterostomy 09/04/20 LUQ decompression 1 day         NG/OG Tube 09/04/20 Left nostril 1 day         Closed/Suction Drain 09/04/20 1301 Abdomen Bulb 19 Fr. less than 1 day         Closed/Suction Drain 09/04/20 1302 Abdomen Bulb 19 Fr. less than 1 day         Urethral Catheter 09/04/20 1300 Non-latex;Straight-tip 18 Fr. less than 1 day          Airway                 Airway - Non-Surgical 09/04/20 1122 Endotracheal Tube less than 1 day          Arterial Line            Arterial Line 09/04/20 1819 Left Femoral less than 1 day          Peripheral Intravenous Line                 Peripheral IV - Single Lumen Right Forearm -- days                Significant Labs:    CBC/Anemia Profile:  Recent Labs   Lab 09/04/20  0734  09/04/20  1257 09/04/20  1335 09/05/20  0358   WBC 7.29  --   --  8.19 7.96   HGB 11.0*  --   --  10.6* 9.0*   HCT 34.8*   < > 18* 33.4* 27.6*     --   --  239 158   MCV 87  --   --  86 85   RDW 14.5  --   --  14.6* 14.9*    < > = values in this interval not displayed.        Chemistries:  Recent Labs   Lab 09/04/20  0734 09/04/20  0929 09/04/20  1335 09/05/20  0358   * 133*  137 138   K 3.8 3.2* 4.2 3.5    111* 112* 115*   CO2 12* 16* 16* 15*   BUN 34* 32* 28* 26*   CREATININE 2.6* 2.1* 1.8* 1.6*   CALCIUM 6.8* 6.9* 7.9* 6.7*   ALBUMIN 1.5* 1.5* 1.3* 1.0*   PROT 4.0* 3.8* 3.4* 2.9*   BILITOT 0.7 0.7 0.5 0.7   ALKPHOS 153* 146* 121 96   ALT 14 30 59* 77*   AST 38 56* 114* 118*   MG 1.1*  --  1.9 1.2*   PHOS 2.9  --  2.9 3.4       All pertinent labs within the past 24 hours have been reviewed.    Significant Imaging:  I have reviewed all pertinent imaging results/findings within the past 24 hours.    Assessment/Plan:     * Perforated abdominal viscus  Neuro:  -sedation: propofol gtt  -analgesia: fentanyl gtt      CV:  - Vascular surgery consulted for R radial artery occlusion; appreciate recs.  Will discuss anticoagulation with staff today  - requiring decreasing pressor support.  Vaso drip continues, wean as tolerated  - titrate to MAP>60     Pulm:  - intubated, mechanically ventilated.       FEN/GI: Perforated stomach; s/p washout and patch (9/4)   - s/p washout and GI perf repair 9/4.    - plan for return to OR 9/6  - Fluid bolus upon arrival  - Cont Zosyn  - Pantoprazole 40mg IV daily  - Daily metabolic panel with PRN repletion of electrolytes     Renal: Pt with JACQUE upon arrival; BUN/Cr: 32/2.1 (9/4)  - JACQUE improving; BUN/Cr 26/1.6  -   - Continue to monitor with daily metabolic labs     HEME/ID: Pt septic and given several L of fluid upon arrival to SICU  - trend lactate daily  - cont zosyn  - Hb - 9.0  - daily CBC  - s/p Liver transplant in 2002; holding immunosuppressants for now per hepatology team.  Daily tacrolimus level with AM labs     Endo:  - Hx of hypothyroidism; not taking synthroid at home    Critical care was time spent personally by me on the following activities: development of treatment plan with patient or surrogate and bedside caregivers, discussions with consultants, evaluation of patient's response to treatment, examination of patient, ordering and  performing treatments and interventions, ordering and review of laboratory studies, ordering and review of radiographic studies, pulse oximetry, re-evaluation of patient's condition.  This critical care time did not overlap with that of any other provider or involve time for any procedures.     John Spivey MD  Critical Care - Surgery  [facility]

## 2020-09-05 NOTE — ASSESSMENT & PLAN NOTE
50 year old female with sepsis from perforated gastric ulcer, concern for ischemic right hand.  Appears radial artery is thrombosed on bedside ultrasound.  Severe vessel spasm on bedside ultrasound bilaterally.  Mottling is now global, more likely sepsis related    -RUE U/S not read yet, but looks to be clot in radial and an open ulnar artery  -Would recommend heparin gtt, no bolus needed, but we recognize this may not be possible given recent surgery and open abdomen  -Warm hands and feet actively  -recommend nitropaste to R hand if pressure can tollerate

## 2020-09-05 NOTE — TREATMENT PLAN
Hepatology Treatment Plan    Elda Hernandez is a 50 y.o. female admitted to hospital 9/3/2020 (Hospital Day: 3) due to Perforated abdominal viscus. Hepatology following for immunosuppression management.    To OR yesterday emergently for gastric perforation s/p ex lap and repair.  Had ischemia of RUE with thrombosed radial artery. Recommending heparin gtt.  On hydrocortisone.      Lab Results   Component Value Date    TACROLIMUS 3.0 (L) 09/05/2020    TACROLIMUS 5.7 09/04/2020    TACROLIMUS 6.0 09/04/2020       Lab Results   Component Value Date    CREATININE 1.6 (H) 09/05/2020    CREATININE 1.8 (H) 09/04/2020    CREATININE 2.1 (H) 09/04/2020       Lab Results   Component Value Date    ALT 77 (H) 09/05/2020     (H) 09/05/2020     (H) 04/07/2018    ALKPHOS 96 09/05/2020    BILITOT 0.7 09/05/2020    WBC 7.96 09/05/2020    HGB 9.0 (L) 09/05/2020     09/05/2020       Kidney function is improving  Liver enzymes are worsening. But this is post-op.    Plan  - Changes to immunosuppression regimen as below    Immunosuppression Regimen:  Tacrolimus: continue to hold  CellCept: continue to hold    Please notify hepatology on day of discharge to discuss discharge medications and follow up.     Please obtain daily CBC, BMP, LFT, INR, immunosuppressant trough level    Thank you for involving us in the care of Elda Hernandez. Please call with any additional concerns or questions.    Js Matias MD  Gastroenterology Fellow

## 2020-09-05 NOTE — PROGRESS NOTES
[facility] [address]  General Surgery  Progress Note    Subjective:     History of Present Illness:  No notes on file    Post-Op Info:  Procedure(s) (LRB):  LAPAROTOMY, EXPLORATORY (N/A)   1 Day Post-Op     Interval History: Patient emergently taken to the OR yesterday for evidence of free air on CT. Intraop found to have 2L of succus in the abdomen and a 3cm perforation int he proximal anterior wall of the stomach toward the greater curvature. This was primarily closed. A drain was left in place. Fascia was left open and an Abthera wound vac was placed.     On yesterday she was found to have ischemia of the right hand thought to be due to a thrombus in the radial artery where the chloé was placed. Vascular surgery consulted. recommended removed chloé and applying nitropaste.     On BP very labile requiring pressors. Received 2L boluses of LR. Generalized mottling improved.     Medications:  Continuous Infusions:   fentanyl 75 mcg/hr (09/05/20 0800)    lactated ringers 150 mL/hr at 09/05/20 0600    norepinephrine bitartrate-D5W 0.02 mcg/kg/min (09/05/20 0835)    propofoL 40 mcg/kg/min (09/05/20 0800)    vasopressin (PITRESSIN) infusion 0.02 Units/min (09/05/20 0832)     Scheduled Meds:   pantoprazole  40 mg Intravenous Daily    piperacillin-tazobactam (ZOSYN) IVPB  4.5 g Intravenous Q8H     PRN Meds:sodium chloride, sodium chloride, sodium chloride, calcium gluconate IVPB, calcium gluconate IVPB, calcium gluconate IVPB, dextrose 50%, dextrose 50%, fentaNYL, glucagon (human recombinant), glucose, glucose, magnesium sulfate IVPB, magnesium sulfate IVPB, melatonin, ondansetron, oxyCODONE, potassium chloride in water **AND** potassium chloride in water **AND** potassium chloride in water, promethazine, sodium chloride 0.9%, sodium chloride 0.9%, sodium phosphate IVPB, sodium phosphate IVPB, sodium phosphate IVPB     Review of patient's allergies indicates:   Allergen Reactions    Codeine Itching      Other reaction(s): Itching    Lipitor [atorvastatin] Other (See Comments)     Other reaction(s): Muscle pain  Muscle cranmps    Morphine Itching     Other reaction(s): nausea and vomiting     Zoloft [sertraline] Other (See Comments)     Tremors/muscle spasms     Objective:     Vital Signs (Most Recent):  Temp: 98.4 °F (36.9 °C) (09/05/20 0715)  Pulse: (!) 116 (09/05/20 0820)  Resp: (!) 24 (09/05/20 0820)  BP: (!) 104/46 (09/05/20 0700)  SpO2: (!) 92 % (09/05/20 0820) Vital Signs (24h Range):  Temp:  [97.5 °F (36.4 °C)-98.6 °F (37 °C)] 98.4 °F (36.9 °C)  Pulse:  [] 116  Resp:  [0-34] 24  SpO2:  [91 %-100 %] 92 %  BP: ()/(44-83) 104/46  Arterial Line BP: ()/(37-93) 187/59     Weight: 56 kg (123 lb 7.3 oz)  Body mass index is 24.94 kg/m².    Intake/Output - Last 3 Shifts       09/03 0700 - 09/04 0659 09/04 0700 - 09/05 0659 09/05 0700 - 09/06 0659    I.V. (mL/kg)  4257.8 (76)     IV Piggyback  6200     Total Intake(mL/kg)  81120.8 (186.7)     Urine (mL/kg/hr)  1045 (0.8) 48 (0.5)    Drains  330 118    Other  1250     Total Output  2625 166    Net  +7832.8 -166           Stool Occurrence 0 x            Physical Exam  Constitutional:       Comments: Intubated/Sedated   HENT:      Head: Normocephalic and atraumatic.   Cardiovascular:      Rate and Rhythm: Normal rate and regular rhythm.   Pulmonary:      Effort: Pulmonary effort is normal. No respiratory distress.      Comments: Vent Mode: A/C  Oxygen Concentration (%):  (50-60) 50  Resp Rate Total:  (24 br/min) 24 br/min  Vt Set:  (330 mL) 330 mL  PEEP/CPAP:  (3 cmH20-5 cmH20) 5 cmH20  Mean Airway Pressure:  (6.8 cmH20-9.2 cmH20) 9.2 cmH20  Abdominal:      Palpations: Abdomen is soft.      Comments: ABThera wound vac in place with serous output   Skin:     General: Skin is warm and dry.      Comments: Mottling improving   Neurological:      Comments: Sedated         Significant Labs:  CBC:   Recent Labs   Lab 09/05/20  0358   WBC 7.96   RBC 3.24*    HGB 9.0*   HCT 27.6*      MCV 85   MCH 27.8   MCHC 32.6     CMP:   Recent Labs   Lab 09/05/20  0358   GLU 66*   CALCIUM 6.7*   ALBUMIN 1.0*   PROT 2.9*      K 3.5   CO2 15*   *   BUN 26*   CREATININE 1.6*   ALKPHOS 96   ALT 77*   *   BILITOT 0.7     LFTs:   Recent Labs   Lab 09/05/20  0358   ALT 77*   *   ALKPHOS 96   BILITOT 0.7   PROT 2.9*   ALBUMIN 1.0*     Coagulation:   Recent Labs   Lab 09/04/20  1335 09/05/20  0358   LABPROT 14.6* 16.7*  16.7*   INR 1.3* 1.5*  1.5*   APTT 33.0*  --        Significant Diagnostics:  I have reviewed all pertinent imaging results/findings within the past 24 hours.    Assessment/Plan:     * Perforated abdominal viscus  50 y.o. female w/ free air on CT scan. S/p emergent ex lap w/ findings of gastric perforation. Closed primarily. ABTthera in place.    Continue SICU care  Will RTOR on 9/6. Need to obtain consent. Covid test negative 9/4  Wean pressors as tolerated  Vascular surgery consulted for ischemia of the right hand. Improving.     Remains critically ill in the SICU         Rocio Hidalgo MD  General Surgery  [facility]

## 2020-09-05 NOTE — SUBJECTIVE & OBJECTIVE
Medications:  Continuous Infusions:   fentanyl 2,500 mcg/hr (09/05/20 0932)    lactated ringers 150 mL/hr (09/05/20 0900)    norepinephrine bitartrate-D5W Stopped (09/05/20 0931)    propofoL 40 mcg/kg/min (09/05/20 0921)    vasopressin (PITRESSIN) infusion 0.02 Units/min (09/05/20 1034)     Scheduled Meds:   albumin human 5%  12.5 g Intravenous Once    hydrocortisone sodium succinate  100 mg Intravenous Q8H    nitroGLYCERIN 2% TD oint  0.5 inch Topical (Top) Q6H    pantoprazole  40 mg Intravenous Daily    piperacillin-tazobactam (ZOSYN) IVPB  4.5 g Intravenous Q8H     PRN Meds:sodium chloride, sodium chloride, sodium chloride, calcium gluconate IVPB, calcium gluconate IVPB, calcium gluconate IVPB, dextrose 50%, dextrose 50%, fentaNYL, glucagon (human recombinant), glucose, glucose, magnesium sulfate IVPB, magnesium sulfate IVPB, melatonin, ondansetron, oxyCODONE, potassium chloride in water **AND** potassium chloride in water **AND** potassium chloride in water, promethazine, sodium chloride 0.9%, sodium chloride 0.9%, sodium phosphate IVPB, sodium phosphate IVPB, sodium phosphate IVPB     Objective:     Vital Signs (Most Recent):  Temp: 98.4 °F (36.9 °C) (09/05/20 0715)  Pulse: 100 (09/05/20 0915)  Resp: (!) 24 (09/05/20 0820)  BP: 128/66 (09/05/20 0845)  SpO2: 95 % (09/05/20 0915) Vital Signs (24h Range):  Temp:  [97.5 °F (36.4 °C)-98.6 °F (37 °C)] 98.4 °F (36.9 °C)  Pulse:  [] 100  Resp:  [0-25] 24  SpO2:  [91 %-100 %] 95 %  BP: ()/(43-83) 128/66  Arterial Line BP: ()/(37-93) 155/50     Date 09/05/20 0700 - 09/06/20 0659   Shift 3723-6538 3755-3000 3901-4760 24 Hour Total   INTAKE   Shift Total(mL/kg)       OUTPUT   Urine(mL/kg/hr) 48   48   Drains 118   118   Shift Total(mL/kg) 166(3)   166(3)   Weight (kg) 56 56 56 56       Physical Exam  Vitals signs reviewed.   Constitutional:       Comments: sedated   HENT:      Head: Normocephalic and atraumatic.      Mouth/Throat:       Comments: intubated  Neck:      Comments: RIJ central line  Cardiovascular:      Rate and Rhythm: Regular rhythm. Tachycardia present.      Heart sounds: Normal heart sounds. No murmur.   Pulmonary:      Breath sounds: Normal breath sounds. No wheezing or rhonchi.      Comments: Intubated and mechanically ventilated  Abdominal:      Comments: G-tube.  Wound vac   Skin:     Comments: R arm and both legs looking much better since yesterday.  A-line has been removed and placed in groin.         Significant Labs:  CBC:   Recent Labs   Lab 09/05/20  0358   WBC 7.96   RBC 3.24*   HGB 9.0*   HCT 27.6*      MCV 85   MCH 27.8   MCHC 32.6     CMP:   Recent Labs   Lab 09/05/20  0358   GLU 66*   CALCIUM 6.7*   ALBUMIN 1.0*   PROT 2.9*      K 3.5   CO2 15*   *   BUN 26*   CREATININE 1.6*   ALKPHOS 96   ALT 77*   *   BILITOT 0.7       Significant Diagnostics:  I have reviewed all pertinent imaging results/findings within the past 24 hours.

## 2020-09-06 ENCOUNTER — ANESTHESIA (OUTPATIENT)
Dept: SURGERY | Facility: HOSPITAL | Age: 50
DRG: 853 | End: 2020-09-06
Payer: MEDICARE

## 2020-09-06 LAB
ABO + RH BLD: NORMAL
ALBUMIN SERPL BCP-MCNC: 3.2 G/DL (ref 3.5–5.2)
ALBUMIN SERPL BCP-MCNC: 3.2 G/DL (ref 3.5–5.2)
ALBUMIN SERPL BCP-MCNC: 3.3 G/DL (ref 3.5–5.2)
ALBUMIN SERPL BCP-MCNC: 3.4 G/DL (ref 3.5–5.2)
ALBUMIN SERPL BCP-MCNC: 3.4 G/DL (ref 3.5–5.2)
ALLENS TEST: ABNORMAL
ALP SERPL-CCNC: 102 U/L (ref 55–135)
ALP SERPL-CCNC: 111 U/L (ref 55–135)
ALP SERPL-CCNC: 74 U/L (ref 55–135)
ALP SERPL-CCNC: 88 U/L (ref 55–135)
ALP SERPL-CCNC: 88 U/L (ref 55–135)
ALP SERPL-CCNC: 89 U/L (ref 55–135)
ALP SERPL-CCNC: 89 U/L (ref 55–135)
ALT SERPL W/O P-5'-P-CCNC: 108 U/L (ref 10–44)
ALT SERPL W/O P-5'-P-CCNC: 108 U/L (ref 10–44)
ALT SERPL W/O P-5'-P-CCNC: 109 U/L (ref 10–44)
ALT SERPL W/O P-5'-P-CCNC: 110 U/L (ref 10–44)
ALT SERPL W/O P-5'-P-CCNC: 117 U/L (ref 10–44)
ALT SERPL W/O P-5'-P-CCNC: 118 U/L (ref 10–44)
ALT SERPL W/O P-5'-P-CCNC: 118 U/L (ref 10–44)
ANION GAP SERPL CALC-SCNC: 12 MMOL/L (ref 8–16)
ANION GAP SERPL CALC-SCNC: 13 MMOL/L (ref 8–16)
ANION GAP SERPL CALC-SCNC: 15 MMOL/L (ref 8–16)
ANISOCYTOSIS BLD QL SMEAR: SLIGHT
APTT BLDCRRT: 51.8 SEC (ref 21–32)
APTT BLDCRRT: 57.2 SEC (ref 21–32)
AST SERPL-CCNC: 265 U/L (ref 10–40)
AST SERPL-CCNC: 281 U/L (ref 10–40)
AST SERPL-CCNC: 286 U/L (ref 10–40)
AST SERPL-CCNC: 287 U/L (ref 10–40)
AST SERPL-CCNC: 287 U/L (ref 10–40)
AST SERPL-CCNC: 293 U/L (ref 10–40)
AST SERPL-CCNC: 293 U/L (ref 10–40)
BASOPHILS # BLD AUTO: 0.02 K/UL (ref 0–0.2)
BASOPHILS # BLD AUTO: 0.02 K/UL (ref 0–0.2)
BASOPHILS # BLD AUTO: 0.03 K/UL (ref 0–0.2)
BASOPHILS # BLD AUTO: 0.04 K/UL (ref 0–0.2)
BASOPHILS # BLD AUTO: 0.05 K/UL (ref 0–0.2)
BASOPHILS # BLD AUTO: 0.05 K/UL (ref 0–0.2)
BASOPHILS NFR BLD: 0.3 % (ref 0–1.9)
BASOPHILS NFR BLD: 0.4 % (ref 0–1.9)
BASOPHILS NFR BLD: 0.4 % (ref 0–1.9)
BASOPHILS NFR BLD: 0.6 % (ref 0–1.9)
BASOPHILS NFR BLD: 0.8 % (ref 0–1.9)
BASOPHILS NFR BLD: 0.8 % (ref 0–1.9)
BILIRUB DIRECT SERPL-MCNC: 1.2 MG/DL (ref 0.1–0.3)
BILIRUB DIRECT SERPL-MCNC: 2.1 MG/DL (ref 0.1–0.3)
BILIRUB DIRECT SERPL-MCNC: 3.2 MG/DL (ref 0.1–0.3)
BILIRUB DIRECT SERPL-MCNC: 3.4 MG/DL (ref 0.1–0.3)
BILIRUB SERPL-MCNC: 1.4 MG/DL (ref 0.1–1)
BILIRUB SERPL-MCNC: 3.1 MG/DL (ref 0.1–1)
BILIRUB SERPL-MCNC: 3.1 MG/DL (ref 0.1–1)
BILIRUB SERPL-MCNC: 3.6 MG/DL (ref 0.1–1)
BILIRUB SERPL-MCNC: 3.9 MG/DL (ref 0.1–1)
BILIRUB SERPL-MCNC: 4.4 MG/DL (ref 0.1–1)
BILIRUB SERPL-MCNC: 4.4 MG/DL (ref 0.1–1)
BLD GP AB SCN CELLS X3 SERPL QL: NORMAL
BLD PROD TYP BPU: NORMAL
BLD PROD TYP BPU: NORMAL
BLOOD UNIT EXPIRATION DATE: NORMAL
BLOOD UNIT EXPIRATION DATE: NORMAL
BLOOD UNIT TYPE CODE: 7300
BLOOD UNIT TYPE CODE: 7300
BLOOD UNIT TYPE: NORMAL
BLOOD UNIT TYPE: NORMAL
BUN SERPL-MCNC: 34 MG/DL (ref 6–20)
BUN SERPL-MCNC: 37 MG/DL (ref 6–20)
BUN SERPL-MCNC: 40 MG/DL (ref 6–20)
BURR CELLS BLD QL SMEAR: ABNORMAL
BURR CELLS BLD QL SMEAR: ABNORMAL
CALCIUM SERPL-MCNC: 7.9 MG/DL (ref 8.7–10.5)
CALCIUM SERPL-MCNC: 7.9 MG/DL (ref 8.7–10.5)
CALCIUM SERPL-MCNC: 8 MG/DL (ref 8.7–10.5)
CHLORIDE SERPL-SCNC: 107 MMOL/L (ref 95–110)
CHLORIDE SERPL-SCNC: 108 MMOL/L (ref 95–110)
CHLORIDE SERPL-SCNC: 108 MMOL/L (ref 95–110)
CO2 SERPL-SCNC: 16 MMOL/L (ref 23–29)
CO2 SERPL-SCNC: 16 MMOL/L (ref 23–29)
CO2 SERPL-SCNC: 17 MMOL/L (ref 23–29)
CODING SYSTEM: NORMAL
CODING SYSTEM: NORMAL
CREAT SERPL-MCNC: 2.1 MG/DL (ref 0.5–1.4)
CREAT SERPL-MCNC: 2.3 MG/DL (ref 0.5–1.4)
CREAT SERPL-MCNC: 2.6 MG/DL (ref 0.5–1.4)
DELSYS: ABNORMAL
DIFFERENTIAL METHOD: ABNORMAL
DISPENSE STATUS: NORMAL
DISPENSE STATUS: NORMAL
DOHLE BOD BLD QL SMEAR: PRESENT
EOSINOPHIL # BLD AUTO: 0 K/UL (ref 0–0.5)
EOSINOPHIL NFR BLD: 0 % (ref 0–8)
EOSINOPHIL NFR BLD: 0.2 % (ref 0–8)
EOSINOPHIL NFR BLD: 0.2 % (ref 0–8)
ERYTHROCYTE [DISTWIDTH] IN BLOOD BY AUTOMATED COUNT: 14.7 % (ref 11.5–14.5)
ERYTHROCYTE [DISTWIDTH] IN BLOOD BY AUTOMATED COUNT: 14.7 % (ref 11.5–14.5)
ERYTHROCYTE [DISTWIDTH] IN BLOOD BY AUTOMATED COUNT: 15 % (ref 11.5–14.5)
ERYTHROCYTE [DISTWIDTH] IN BLOOD BY AUTOMATED COUNT: 15.2 % (ref 11.5–14.5)
ERYTHROCYTE [DISTWIDTH] IN BLOOD BY AUTOMATED COUNT: 15.2 % (ref 11.5–14.5)
ERYTHROCYTE [DISTWIDTH] IN BLOOD BY AUTOMATED COUNT: 15.3 % (ref 11.5–14.5)
ERYTHROCYTE [SEDIMENTATION RATE] IN BLOOD BY WESTERGREN METHOD: 12 MM/H
ERYTHROCYTE [SEDIMENTATION RATE] IN BLOOD BY WESTERGREN METHOD: 16 MM/H
ERYTHROCYTE [SEDIMENTATION RATE] IN BLOOD BY WESTERGREN METHOD: 20 MM/H
EST. GFR  (AFRICAN AMERICAN): 23.6 ML/MIN/1.73 M^2
EST. GFR  (AFRICAN AMERICAN): 27.3 ML/MIN/1.73 M^2
EST. GFR  (AFRICAN AMERICAN): 30.5 ML/MIN/1.73 M^2
EST. GFR  (NON AFRICAN AMERICAN): 20.5 ML/MIN/1.73 M^2
EST. GFR  (NON AFRICAN AMERICAN): 23.7 ML/MIN/1.73 M^2
EST. GFR  (NON AFRICAN AMERICAN): 26.5 ML/MIN/1.73 M^2
FIO2: 40
FIO2: 50
FIO2: 50
GLUCOSE SERPL-MCNC: 113 MG/DL (ref 70–110)
GLUCOSE SERPL-MCNC: 117 MG/DL (ref 70–110)
GLUCOSE SERPL-MCNC: 123 MG/DL (ref 70–110)
HCO3 UR-SCNC: 15.5 MMOL/L (ref 24–28)
HCO3 UR-SCNC: 16.3 MMOL/L (ref 24–28)
HCO3 UR-SCNC: 17.2 MMOL/L (ref 24–28)
HCO3 UR-SCNC: 17.7 MMOL/L (ref 24–28)
HCO3 UR-SCNC: 18.3 MMOL/L (ref 24–28)
HCO3 UR-SCNC: 19.2 MMOL/L (ref 24–28)
HCT VFR BLD AUTO: 17.4 % (ref 37–48.5)
HCT VFR BLD AUTO: 17.4 % (ref 37–48.5)
HCT VFR BLD AUTO: 28.2 % (ref 37–48.5)
HCT VFR BLD AUTO: 28.8 % (ref 37–48.5)
HCT VFR BLD AUTO: 28.8 % (ref 37–48.5)
HCT VFR BLD AUTO: 29.4 % (ref 37–48.5)
HGB BLD-MCNC: 10 G/DL (ref 12–16)
HGB BLD-MCNC: 5.6 G/DL (ref 12–16)
HGB BLD-MCNC: 5.6 G/DL (ref 12–16)
HGB BLD-MCNC: 9.6 G/DL (ref 12–16)
HGB BLD-MCNC: 9.6 G/DL (ref 12–16)
HGB BLD-MCNC: 9.7 G/DL (ref 12–16)
HYPOCHROMIA BLD QL SMEAR: ABNORMAL
IMM GRANULOCYTES # BLD AUTO: 0.05 K/UL (ref 0–0.04)
IMM GRANULOCYTES # BLD AUTO: 0.1 K/UL (ref 0–0.04)
IMM GRANULOCYTES # BLD AUTO: 0.1 K/UL (ref 0–0.04)
IMM GRANULOCYTES # BLD AUTO: 0.12 K/UL (ref 0–0.04)
IMM GRANULOCYTES # BLD AUTO: 0.14 K/UL (ref 0–0.04)
IMM GRANULOCYTES # BLD AUTO: 0.25 K/UL (ref 0–0.04)
IMM GRANULOCYTES NFR BLD AUTO: 1 % (ref 0–0.5)
IMM GRANULOCYTES NFR BLD AUTO: 1.6 % (ref 0–0.5)
IMM GRANULOCYTES NFR BLD AUTO: 1.6 % (ref 0–0.5)
IMM GRANULOCYTES NFR BLD AUTO: 1.9 % (ref 0–0.5)
IMM GRANULOCYTES NFR BLD AUTO: 2.5 % (ref 0–0.5)
IMM GRANULOCYTES NFR BLD AUTO: 2.6 % (ref 0–0.5)
INR PPP: 1.2 (ref 0.8–1.2)
INR PPP: 1.3 (ref 0.8–1.2)
LACTATE SERPL-SCNC: 1 MMOL/L (ref 0.5–2.2)
LACTATE SERPL-SCNC: 1.1 MMOL/L (ref 0.5–2.2)
LACTATE SERPL-SCNC: 1.2 MMOL/L (ref 0.5–2.2)
LYMPHOCYTES # BLD AUTO: 0.4 K/UL (ref 1–4.8)
LYMPHOCYTES # BLD AUTO: 0.4 K/UL (ref 1–4.8)
LYMPHOCYTES # BLD AUTO: 0.5 K/UL (ref 1–4.8)
LYMPHOCYTES # BLD AUTO: 0.6 K/UL (ref 1–4.8)
LYMPHOCYTES NFR BLD: 6 % (ref 18–48)
LYMPHOCYTES NFR BLD: 7.3 % (ref 18–48)
LYMPHOCYTES NFR BLD: 7.6 % (ref 18–48)
LYMPHOCYTES NFR BLD: 8.3 % (ref 18–48)
MAGNESIUM SERPL-MCNC: 2.5 MG/DL (ref 1.6–2.6)
MCH RBC QN AUTO: 28 PG (ref 27–31)
MCH RBC QN AUTO: 28.1 PG (ref 27–31)
MCH RBC QN AUTO: 29.7 PG (ref 27–31)
MCH RBC QN AUTO: 29.9 PG (ref 27–31)
MCHC RBC AUTO-ENTMCNC: 32.2 G/DL (ref 32–36)
MCHC RBC AUTO-ENTMCNC: 32.2 G/DL (ref 32–36)
MCHC RBC AUTO-ENTMCNC: 33.3 G/DL (ref 32–36)
MCHC RBC AUTO-ENTMCNC: 33.3 G/DL (ref 32–36)
MCHC RBC AUTO-ENTMCNC: 34 G/DL (ref 32–36)
MCHC RBC AUTO-ENTMCNC: 34.4 G/DL (ref 32–36)
MCV RBC AUTO: 86 FL (ref 82–98)
MCV RBC AUTO: 87 FL (ref 82–98)
MCV RBC AUTO: 87 FL (ref 82–98)
MCV RBC AUTO: 88 FL (ref 82–98)
MCV RBC AUTO: 89 FL (ref 82–98)
MCV RBC AUTO: 89 FL (ref 82–98)
MIN VOL: 5
MIN VOL: 6
MIN VOL: 7
MODE: ABNORMAL
MONOCYTES # BLD AUTO: 0.5 K/UL (ref 0.3–1)
MONOCYTES # BLD AUTO: 0.6 K/UL (ref 0.3–1)
MONOCYTES # BLD AUTO: 1 K/UL (ref 0.3–1)
MONOCYTES NFR BLD: 10 % (ref 4–15)
MONOCYTES NFR BLD: 10.1 % (ref 4–15)
MONOCYTES NFR BLD: 10.9 % (ref 4–15)
MONOCYTES NFR BLD: 9.2 % (ref 4–15)
MONOCYTES NFR BLD: 9.4 % (ref 4–15)
MONOCYTES NFR BLD: 9.4 % (ref 4–15)
NEUTROPHILS # BLD AUTO: 4 K/UL (ref 1.8–7.7)
NEUTROPHILS # BLD AUTO: 4.5 K/UL (ref 1.8–7.7)
NEUTROPHILS # BLD AUTO: 5 K/UL (ref 1.8–7.7)
NEUTROPHILS # BLD AUTO: 5.1 K/UL (ref 1.8–7.7)
NEUTROPHILS # BLD AUTO: 5.1 K/UL (ref 1.8–7.7)
NEUTROPHILS # BLD AUTO: 7.7 K/UL (ref 1.8–7.7)
NEUTROPHILS NFR BLD: 78.6 % (ref 38–73)
NEUTROPHILS NFR BLD: 80.2 % (ref 38–73)
NEUTROPHILS NFR BLD: 80.4 % (ref 38–73)
NEUTROPHILS NFR BLD: 80.4 % (ref 38–73)
NEUTROPHILS NFR BLD: 81 % (ref 38–73)
NEUTROPHILS NFR BLD: 81.1 % (ref 38–73)
NRBC BLD-RTO: 0 /100 WBC
NRBC BLD-RTO: 1 /100 WBC
NUM UNITS TRANS PACKED RBC: NORMAL
NUM UNITS TRANS PACKED RBC: NORMAL
OVALOCYTES BLD QL SMEAR: ABNORMAL
PCO2 BLDA: 27.5 MMHG (ref 35–45)
PCO2 BLDA: 29.9 MMHG (ref 35–45)
PCO2 BLDA: 30.3 MMHG (ref 35–45)
PCO2 BLDA: 34.2 MMHG (ref 35–45)
PCO2 BLDA: 35.5 MMHG (ref 35–45)
PCO2 BLDA: 42.5 MMHG (ref 35–45)
PEEP: 5
PH SMN: 7.24 [PH] (ref 7.35–7.45)
PH SMN: 7.31 [PH] (ref 7.35–7.45)
PH SMN: 7.32 [PH] (ref 7.35–7.45)
PH SMN: 7.34 [PH] (ref 7.35–7.45)
PH SMN: 7.34 [PH] (ref 7.35–7.45)
PH SMN: 7.42 [PH] (ref 7.35–7.45)
PHOSPHATE SERPL-MCNC: 4.5 MG/DL (ref 2.7–4.5)
PIP: 19
PIP: 19
PIP: 20
PLATELET # BLD AUTO: 71 K/UL (ref 150–350)
PLATELET # BLD AUTO: 73 K/UL (ref 150–350)
PLATELET # BLD AUTO: 84 K/UL (ref 150–350)
PLATELET # BLD AUTO: 87 K/UL (ref 150–350)
PLATELET # BLD AUTO: 91 K/UL (ref 150–350)
PLATELET # BLD AUTO: 91 K/UL (ref 150–350)
PLATELET BLD QL SMEAR: ABNORMAL
PMV BLD AUTO: 11.2 FL (ref 9.2–12.9)
PMV BLD AUTO: 11.3 FL (ref 9.2–12.9)
PMV BLD AUTO: 11.6 FL (ref 9.2–12.9)
PMV BLD AUTO: 11.8 FL (ref 9.2–12.9)
PO2 BLDA: 107 MMHG (ref 80–100)
PO2 BLDA: 128 MMHG (ref 80–100)
PO2 BLDA: 63 MMHG (ref 80–100)
PO2 BLDA: 67 MMHG (ref 80–100)
PO2 BLDA: 82 MMHG (ref 80–100)
PO2 BLDA: 87 MMHG (ref 80–100)
POC BE: -11 MMOL/L
POC BE: -7 MMOL/L
POC BE: -7 MMOL/L
POC BE: -9 MMOL/L
POC SATURATED O2: 90 % (ref 95–100)
POC SATURATED O2: 91 % (ref 95–100)
POC SATURATED O2: 95 % (ref 95–100)
POC SATURATED O2: 96 % (ref 95–100)
POC SATURATED O2: 98 % (ref 95–100)
POC SATURATED O2: 99 % (ref 95–100)
POC TCO2: 16 MMOL/L (ref 23–27)
POC TCO2: 17 MMOL/L (ref 23–27)
POC TCO2: 18 MMOL/L (ref 23–27)
POC TCO2: 19 MMOL/L (ref 23–27)
POC TCO2: 20 MMOL/L (ref 23–27)
POC TCO2: 20 MMOL/L (ref 23–27)
POCT GLUCOSE: 106 MG/DL (ref 70–110)
POCT GLUCOSE: 108 MG/DL (ref 70–110)
POCT GLUCOSE: 114 MG/DL (ref 70–110)
POCT GLUCOSE: 114 MG/DL (ref 70–110)
POCT GLUCOSE: 95 MG/DL (ref 70–110)
POCT GLUCOSE: 98 MG/DL (ref 70–110)
POIKILOCYTOSIS BLD QL SMEAR: SLIGHT
POLYCHROMASIA BLD QL SMEAR: ABNORMAL
POTASSIUM SERPL-SCNC: 3.9 MMOL/L (ref 3.5–5.1)
POTASSIUM SERPL-SCNC: 4.3 MMOL/L (ref 3.5–5.1)
POTASSIUM SERPL-SCNC: 4.3 MMOL/L (ref 3.5–5.1)
PROT SERPL-MCNC: 4.8 G/DL (ref 6–8.4)
PROT SERPL-MCNC: 5 G/DL (ref 6–8.4)
PROT SERPL-MCNC: 5 G/DL (ref 6–8.4)
PROT SERPL-MCNC: 5.2 G/DL (ref 6–8.4)
PROT SERPL-MCNC: 5.3 G/DL (ref 6–8.4)
PROTHROMBIN TIME: 13.5 SEC (ref 9–12.5)
PROTHROMBIN TIME: 14.6 SEC (ref 9–12.5)
RBC # BLD AUTO: 1.99 M/UL (ref 4–5.4)
RBC # BLD AUTO: 2 M/UL (ref 4–5.4)
RBC # BLD AUTO: 3.23 M/UL (ref 4–5.4)
RBC # BLD AUTO: 3.23 M/UL (ref 4–5.4)
RBC # BLD AUTO: 3.27 M/UL (ref 4–5.4)
RBC # BLD AUTO: 3.35 M/UL (ref 4–5.4)
SAMPLE: ABNORMAL
SITE: ABNORMAL
SODIUM SERPL-SCNC: 137 MMOL/L (ref 136–145)
SODIUM SERPL-SCNC: 137 MMOL/L (ref 136–145)
SODIUM SERPL-SCNC: 138 MMOL/L (ref 136–145)
SP02: 100
TACROLIMUS BLD-MCNC: 2.3 NG/ML (ref 5–15)
TOXIC GRANULES BLD QL SMEAR: PRESENT
VT: 330
WBC # BLD AUTO: 4.96 K/UL (ref 3.9–12.7)
WBC # BLD AUTO: 5.69 K/UL (ref 3.9–12.7)
WBC # BLD AUTO: 6.17 K/UL (ref 3.9–12.7)
WBC # BLD AUTO: 6.29 K/UL (ref 3.9–12.7)
WBC # BLD AUTO: 6.29 K/UL (ref 3.9–12.7)
WBC # BLD AUTO: 9.54 K/UL (ref 3.9–12.7)

## 2020-09-06 PROCEDURE — C9113 INJ PANTOPRAZOLE SODIUM, VIA: HCPCS | Performed by: STUDENT IN AN ORGANIZED HEALTH CARE EDUCATION/TRAINING PROGRAM

## 2020-09-06 PROCEDURE — 49002 PR REOPEN RECENT ABD EXPLORATORY: ICD-10-PCS | Mod: 58,,, | Performed by: SURGERY

## 2020-09-06 PROCEDURE — 99900026 HC AIRWAY MAINTENANCE (STAT)

## 2020-09-06 PROCEDURE — 85025 COMPLETE CBC W/AUTO DIFF WBC: CPT | Mod: 91

## 2020-09-06 PROCEDURE — 20000000 HC ICU ROOM

## 2020-09-06 PROCEDURE — 25000003 PHARM REV CODE 250: Performed by: INTERNAL MEDICINE

## 2020-09-06 PROCEDURE — 84100 ASSAY OF PHOSPHORUS: CPT

## 2020-09-06 PROCEDURE — 85610 PROTHROMBIN TIME: CPT | Mod: 91

## 2020-09-06 PROCEDURE — 99232 PR SUBSEQUENT HOSPITAL CARE,LEVL II: ICD-10-PCS | Mod: ,,, | Performed by: INTERNAL MEDICINE

## 2020-09-06 PROCEDURE — 83605 ASSAY OF LACTIC ACID: CPT | Mod: 91

## 2020-09-06 PROCEDURE — 99900035 HC TECH TIME PER 15 MIN (STAT)

## 2020-09-06 PROCEDURE — 85730 THROMBOPLASTIN TIME PARTIAL: CPT | Mod: 91

## 2020-09-06 PROCEDURE — P9047 ALBUMIN (HUMAN), 25%, 50ML: HCPCS | Mod: JG | Performed by: STUDENT IN AN ORGANIZED HEALTH CARE EDUCATION/TRAINING PROGRAM

## 2020-09-06 PROCEDURE — P9016 RBC LEUKOCYTES REDUCED: HCPCS

## 2020-09-06 PROCEDURE — 82803 BLOOD GASES ANY COMBINATION: CPT

## 2020-09-06 PROCEDURE — 80076 HEPATIC FUNCTION PANEL: CPT

## 2020-09-06 PROCEDURE — D9220A PRA ANESTHESIA: Mod: ANES,,, | Performed by: STUDENT IN AN ORGANIZED HEALTH CARE EDUCATION/TRAINING PROGRAM

## 2020-09-06 PROCEDURE — D9220A PRA ANESTHESIA: Mod: CRNA,,, | Performed by: STUDENT IN AN ORGANIZED HEALTH CARE EDUCATION/TRAINING PROGRAM

## 2020-09-06 PROCEDURE — D9220A PRA ANESTHESIA: ICD-10-PCS | Mod: CRNA,,, | Performed by: STUDENT IN AN ORGANIZED HEALTH CARE EDUCATION/TRAINING PROGRAM

## 2020-09-06 PROCEDURE — 97802 MEDICAL NUTRITION INDIV IN: CPT

## 2020-09-06 PROCEDURE — 49002 REOPENING OF ABDOMEN: CPT | Mod: 58,,, | Performed by: SURGERY

## 2020-09-06 PROCEDURE — 80053 COMPREHEN METABOLIC PANEL: CPT | Mod: 91

## 2020-09-06 PROCEDURE — 63600175 PHARM REV CODE 636 W HCPCS: Performed by: SURGERY

## 2020-09-06 PROCEDURE — 63600175 PHARM REV CODE 636 W HCPCS: Performed by: STUDENT IN AN ORGANIZED HEALTH CARE EDUCATION/TRAINING PROGRAM

## 2020-09-06 PROCEDURE — 25000003 PHARM REV CODE 250: Performed by: STUDENT IN AN ORGANIZED HEALTH CARE EDUCATION/TRAINING PROGRAM

## 2020-09-06 PROCEDURE — A4217 STERILE WATER/SALINE, 500 ML: HCPCS | Performed by: STUDENT IN AN ORGANIZED HEALTH CARE EDUCATION/TRAINING PROGRAM

## 2020-09-06 PROCEDURE — 63600175 PHARM REV CODE 636 W HCPCS: Mod: JG | Performed by: STUDENT IN AN ORGANIZED HEALTH CARE EDUCATION/TRAINING PROGRAM

## 2020-09-06 PROCEDURE — 99291 PR CRITICAL CARE, E/M 30-74 MINUTES: ICD-10-PCS | Mod: 24,GC,, | Performed by: SURGERY

## 2020-09-06 PROCEDURE — 86850 RBC ANTIBODY SCREEN: CPT

## 2020-09-06 PROCEDURE — 83735 ASSAY OF MAGNESIUM: CPT

## 2020-09-06 PROCEDURE — 37799 UNLISTED PX VASCULAR SURGERY: CPT

## 2020-09-06 PROCEDURE — 36000706: Performed by: SURGERY

## 2020-09-06 PROCEDURE — D9220A PRA ANESTHESIA: ICD-10-PCS | Mod: ANES,,, | Performed by: STUDENT IN AN ORGANIZED HEALTH CARE EDUCATION/TRAINING PROGRAM

## 2020-09-06 PROCEDURE — 99291 CRITICAL CARE FIRST HOUR: CPT | Mod: 24,GC,, | Performed by: SURGERY

## 2020-09-06 PROCEDURE — 36000707: Performed by: SURGERY

## 2020-09-06 PROCEDURE — 63600175 PHARM REV CODE 636 W HCPCS: Performed by: INTERNAL MEDICINE

## 2020-09-06 PROCEDURE — 25000003 PHARM REV CODE 250: Performed by: SURGERY

## 2020-09-06 PROCEDURE — 37000008 HC ANESTHESIA 1ST 15 MINUTES: Performed by: SURGERY

## 2020-09-06 PROCEDURE — 27200966 HC CLOSED SUCTION SYSTEM

## 2020-09-06 PROCEDURE — 99232 SBSQ HOSP IP/OBS MODERATE 35: CPT | Mod: ,,, | Performed by: INTERNAL MEDICINE

## 2020-09-06 PROCEDURE — 85610 PROTHROMBIN TIME: CPT

## 2020-09-06 PROCEDURE — 80053 COMPREHEN METABOLIC PANEL: CPT

## 2020-09-06 PROCEDURE — 94003 VENT MGMT INPAT SUBQ DAY: CPT

## 2020-09-06 PROCEDURE — 27000221 HC OXYGEN, UP TO 24 HOURS

## 2020-09-06 PROCEDURE — 80197 ASSAY OF TACROLIMUS: CPT

## 2020-09-06 PROCEDURE — 94761 N-INVAS EAR/PLS OXIMETRY MLT: CPT

## 2020-09-06 PROCEDURE — 37000009 HC ANESTHESIA EA ADD 15 MINS: Performed by: SURGERY

## 2020-09-06 PROCEDURE — 85730 THROMBOPLASTIN TIME PARTIAL: CPT

## 2020-09-06 RX ORDER — PHENYLEPHRINE HYDROCHLORIDE 10 MG/ML
INJECTION INTRAVENOUS
Status: DISCONTINUED | OUTPATIENT
Start: 2020-09-06 | End: 2020-09-06

## 2020-09-06 RX ORDER — ONDANSETRON 2 MG/ML
INJECTION INTRAMUSCULAR; INTRAVENOUS
Status: DISCONTINUED | OUTPATIENT
Start: 2020-09-06 | End: 2020-09-06

## 2020-09-06 RX ORDER — FUROSEMIDE 10 MG/ML
120 INJECTION INTRAMUSCULAR; INTRAVENOUS ONCE
Status: COMPLETED | OUTPATIENT
Start: 2020-09-06 | End: 2020-09-06

## 2020-09-06 RX ORDER — FUROSEMIDE 10 MG/ML
100 INJECTION INTRAMUSCULAR; INTRAVENOUS ONCE
Status: DISCONTINUED | OUTPATIENT
Start: 2020-09-06 | End: 2020-09-06

## 2020-09-06 RX ORDER — ROCURONIUM BROMIDE 10 MG/ML
INJECTION, SOLUTION INTRAVENOUS
Status: DISCONTINUED | OUTPATIENT
Start: 2020-09-06 | End: 2020-09-06

## 2020-09-06 RX ORDER — FENTANYL CITRATE 50 UG/ML
INJECTION, SOLUTION INTRAMUSCULAR; INTRAVENOUS
Status: DISCONTINUED | OUTPATIENT
Start: 2020-09-06 | End: 2020-09-06

## 2020-09-06 RX ORDER — HYDROCODONE BITARTRATE AND ACETAMINOPHEN 500; 5 MG/1; MG/1
TABLET ORAL
Status: DISCONTINUED | OUTPATIENT
Start: 2020-09-06 | End: 2020-09-06

## 2020-09-06 RX ORDER — MIDAZOLAM HYDROCHLORIDE 1 MG/ML
INJECTION, SOLUTION INTRAMUSCULAR; INTRAVENOUS
Status: DISCONTINUED | OUTPATIENT
Start: 2020-09-06 | End: 2020-09-06

## 2020-09-06 RX ADMIN — VASOPRESSIN 0.04 UNITS/MIN: 20 INJECTION INTRAVENOUS at 01:09

## 2020-09-06 RX ADMIN — VASOPRESSIN 0.04 UNITS/MIN: 20 INJECTION INTRAVENOUS at 02:09

## 2020-09-06 RX ADMIN — SODIUM CHLORIDE, SODIUM LACTATE, POTASSIUM CHLORIDE, AND CALCIUM CHLORIDE: .6; .31; .03; .02 INJECTION, SOLUTION INTRAVENOUS at 12:09

## 2020-09-06 RX ADMIN — PANTOPRAZOLE SODIUM 40 MG: 40 INJECTION, POWDER, LYOPHILIZED, FOR SOLUTION INTRAVENOUS at 09:09

## 2020-09-06 RX ADMIN — ROCURONIUM BROMIDE 20 MG: 10 INJECTION, SOLUTION INTRAVENOUS at 10:09

## 2020-09-06 RX ADMIN — MIDAZOLAM HYDROCHLORIDE 2 MG: 1 INJECTION, SOLUTION INTRAMUSCULAR; INTRAVENOUS at 10:09

## 2020-09-06 RX ADMIN — PROPOFOL 20 MCG/KG/MIN: 10 INJECTION, EMULSION INTRAVENOUS at 08:09

## 2020-09-06 RX ADMIN — NITROGLYCERIN 0.5 INCH: 20 OINTMENT TOPICAL at 05:09

## 2020-09-06 RX ADMIN — PHENYLEPHRINE HYDROCHLORIDE 100 MCG: 10 INJECTION INTRAVENOUS at 11:09

## 2020-09-06 RX ADMIN — HYDROCORTISONE SODIUM SUCCINATE 100 MG: 100 INJECTION, POWDER, FOR SOLUTION INTRAMUSCULAR; INTRAVENOUS at 01:09

## 2020-09-06 RX ADMIN — PIPERACILLIN SODIUM AND TAZOBACTAM SODIUM 4.5 G: 4; .5 INJECTION, POWDER, LYOPHILIZED, FOR SOLUTION INTRAVENOUS at 09:09

## 2020-09-06 RX ADMIN — SODIUM BICARBONATE: 84 INJECTION, SOLUTION INTRAVENOUS at 10:09

## 2020-09-06 RX ADMIN — PIPERACILLIN SODIUM AND TAZOBACTAM SODIUM 4.5 G: 4; .5 INJECTION, POWDER, LYOPHILIZED, FOR SOLUTION INTRAVENOUS at 01:09

## 2020-09-06 RX ADMIN — ONDANSETRON 4 MG: 2 INJECTION, SOLUTION INTRAMUSCULAR; INTRAVENOUS at 10:09

## 2020-09-06 RX ADMIN — FUROSEMIDE 120 MG: 10 INJECTION, SOLUTION INTRAMUSCULAR; INTRAVENOUS at 01:09

## 2020-09-06 RX ADMIN — HYDROCORTISONE SODIUM SUCCINATE 100 MG: 100 INJECTION, POWDER, FOR SOLUTION INTRAMUSCULAR; INTRAVENOUS at 10:09

## 2020-09-06 RX ADMIN — HEPARIN SODIUM AND DEXTROSE 12 UNITS/KG/HR: 10000; 5 INJECTION INTRAVENOUS at 11:09

## 2020-09-06 RX ADMIN — PIPERACILLIN SODIUM AND TAZOBACTAM SODIUM 4.5 G: 4; .5 INJECTION, POWDER, LYOPHILIZED, FOR SOLUTION INTRAVENOUS at 05:09

## 2020-09-06 RX ADMIN — HYDROCORTISONE SODIUM SUCCINATE 100 MG: 100 INJECTION, POWDER, FOR SOLUTION INTRAMUSCULAR; INTRAVENOUS at 05:09

## 2020-09-06 RX ADMIN — NITROGLYCERIN 0.5 INCH: 20 OINTMENT TOPICAL at 12:09

## 2020-09-06 RX ADMIN — Medication 75 MCG/HR: at 11:09

## 2020-09-06 RX ADMIN — SODIUM CHLORIDE, SODIUM GLUCONATE, SODIUM ACETATE, POTASSIUM CHLORIDE, MAGNESIUM CHLORIDE, SODIUM PHOSPHATE, DIBASIC, AND POTASSIUM PHOSPHATE: .53; .5; .37; .037; .03; .012; .00082 INJECTION, SOLUTION INTRAVENOUS at 10:09

## 2020-09-06 RX ADMIN — VASOPRESSIN 0.04 UNITS/MIN: 20 INJECTION INTRAVENOUS at 11:09

## 2020-09-06 RX ADMIN — FENTANYL CITRATE 50 MCG: 50 INJECTION, SOLUTION INTRAMUSCULAR; INTRAVENOUS at 10:09

## 2020-09-06 RX ADMIN — PROPOFOL 40 MCG/KG/MIN: 10 INJECTION, EMULSION INTRAVENOUS at 03:09

## 2020-09-06 RX ADMIN — ALBUMIN (HUMAN) 25 G: 12.5 SOLUTION INTRAVENOUS at 05:09

## 2020-09-06 RX ADMIN — ROCURONIUM BROMIDE 30 MG: 10 INJECTION, SOLUTION INTRAVENOUS at 10:09

## 2020-09-06 NOTE — PLAN OF CARE
Recommendations    Recommendation:  1. If pt remains NPO suggest TF of Impact peptide @ 10 mL/hr increase to goal rate of 30 mL/hr to provide pt with 1080 kcal, 68 g protein and 554 mL free water.    -Additional water per MD. Hold for residuals >500 mL.   2. If extuabted and able to advance diet, suggest regular texture per SLP   3. Suggest Vitamin C, zinc and MVI to aid in wound healing; Add Sean BID if TF started or PO diet   RD to monitor and follow up    Goals: pt to tolerate >85% of EEN/EPN by RD follow up  Nutrition Goal Status: new  Communication of RD Recs: other (comment)(POC)

## 2020-09-06 NOTE — HPI
50 y.o. female that has a past medical history of Angiolipoma of kidney (10/1/2018), Arnold-Chiari malformation, Depression, Esophageal stricture, Essential tremor, Hypertension, Liver fibrosis, Osteoporosis, Recurrent urinary tract infection, Seizures, SIADH (syndrome of inappropriate ADH production), Squamous cell carcinoma (10/2014), and Von Gierke disease s/p liver transplant (2002, on tacro + MMF), HTN, and depression that presented to OSH due to worsening diffuse abdominal for 3 weeks, associated with nausea, vomiting and decreased PO intake. Abdominal CT scan showed gastric outlet narrowing, obstructive-related changes at pyloric-duodenal junction, and thickening of GE junction. EGD on 9/3 found with grade IV esophagitis with stricture. Pt transferred for GI evaluation and found with hypotension requiring vasopressors and lactic Acid of 3.9. Repeat CT showed decompressed abdomen and evidence of free air. Exploratory laparotomy performed and found with stomach perforation s/p washout, gastric perforation repair, and gastrostomy tube placement.  Nephrology consulted for JACQUE

## 2020-09-06 NOTE — ASSESSMENT & PLAN NOTE
Neuro:  -sedation: propofol gtt  -analgesia: fentanyl gtt      CV:  - Vascular surgery consulted for R radial artery occlusion; appreciate recs.   - requiring decreasing pressor support. Vaso drip continues at 0.02 on and off, wean as tolerated  - titrate to MAP>60  - Heparin gtt started last night and discontinued after drop in Hgb.      Pulm:  - intubated, mechanically ventilated.    - Daily CXR  - SBT as tolerated     FEN/GI: Perforated stomach; s/p washout and patch (9/4)   - s/p washout and GI perf repair 9/4.    - plan for return to OR 9/6  - Fluid bolus upon arrival  - Cont Zosyn  - Pantoprazole 40mg IV daily  - Daily metabolic panel with PRN repletion of electrolytes     Renal: Pt with JACQUE upon arrival; BUN/Cr: 32/2.1 (9/4)  - JACQUE improving with increased UOP to 15cc/hr; BUN/Cr 34/2.1  - Continue to monitor with daily metabolic labs  - Lasix challenge     HEME/ID: Pt septic and given several L of fluid upon arrival to SICU  - trend lactate daily  - cont zosyn  - 9/5 Hgb drop from 9.0 -> 6.8 -> 5.6 --- 2 U pRBC given and responded to 9.6  - Hb - 9.6  - daily CBC  - s/p Liver transplant in 2002; holding immunosuppressants for now per hepatology team.  Daily tacrolimus level with AM labs  - Consult ID     Endo:  - Hx of hypothyroidism; not taking synthroid at home    Dispo:  - Continue SICU care

## 2020-09-06 NOTE — PLAN OF CARE
SICU PLAN OF CARE NOTE    Dx: Perforated abdominal viscus    Shift Events: at beginning of shift, Todd ARTHUR at bedside, consented pt  for Trialysis placement, however as I was assessing my pt, I noticed her UOP was slightly picking up out of no where, seems to be responsive to albumin as it was infusing actively at that time, both the 25% and 5%. See trends of UOP at beginning of shift. It was anywhere from 35-70cc/hr. At that time he decided against placing the line and holding off for now. However throughout the night she began to taper off again. <30cc/hr most often 15cc/hr. He was aware however no plan of Trialysis at this time. ABGs Q4 are continuously reflecting metabolic acidosis, rate on vent changed down to 14 however, minimal changes on ABG. Mentioned to SICU team to take a look at nephrology's note that they had put in around 2300, noting that they were thinking of starting a bicarb gtt to correct metabolic acidosis. No orders were ever placed to do so, however. Vascular sx came by around 2130 and took a look at pt R arm, as the US done previous night, showed thrombus to R radial artery. Still mottled and purple along the radial artery line. They recommended starting heparin gtt and continuing nitro for the discoloration in the hand area. Relayed the recommendations to SICU and they placed orders to start Heparin gtt, low dose at 12u/kg/hr. Prior to initiation, routine baseline CBC, PTT, PT/INR labs were sent. CBC came back 6.8/21 which was a big drop from previous CBC morning of 09/05 (9/27.6). just to be sure, as pt did not have any s/s of bleeding anywhere, ABD was soft non-distended, no bloody drainage from any tubes or drains. The redraw CBC to confirm came back around 2300 and it was 5.6/17.4, this seemed even more drastic as it dropped even within the hour of last draw. Spoke with team and confirmed to redraw for a 3rd time just to be sure. They even came to assess pt for bleeding and  "there were no s/s. The last redraw was confirmed at 5.6/17.6, came back around 0000. Orders were then placed to transfuse 2units PRBCs, Todd said okay to give each unit over and hour. Prior to first unit getting to unit to be hung, did have to go up slightly on pressor requirement as MAPS were getting a little soft. Went up from vaso that was at 0.02units/min to 0.04units/min and had to turn on concentrated levo to 0.04mcg/kg/min. was able to shut off levo as soon as first unit of blood was infusing. And eventually turn off vaso as well. However after the blood vaso was needed again, restarted at 0.02units/min. after 2nd unit of blood done, morning labs sent including CBC. She responded nicely to blood, H&H this morning was 9.6/28.8. after the scheduled dose of albumin 25% this am, was able to turn off vaso. VSS. Pt plan for today is to go back to OR @ 1000 for washout, possible closure, possible resection, wvac placement. Consents are done, preop-checklist is complete, sent off another T&S as hers was due to  at midnight tonight, and being she was going back to OR, wanted a current one. Pt was bathed, gown changed. Unable to place on fresh linen as pt is not supposed to be turned w/ open belly.     Goals of Care: comfort, hemodynamic stability, adequate oxygenation, no bleeding, stable H&H, adequate UOP. Electrolyte and fluid balance.     Neuro: Sedated    Vital Signs: BP (!) 90/42   Pulse 70   Temp 98.8 °F (37.1 °C) (Oral)   Resp 16   Ht 4' 11" (1.499 m)   Wt 56 kg (123 lb 7.3 oz)   SpO2 100%   BMI 24.94 kg/m²     Respiratory: Ventilator ACVC 50%/5/16/330    Diet: NPO    Gtts: Propfol, Fentanyl, Vasopressin, MIVF and Abx    Urine Output: Urinary Catheter 325 cc/shift    Drains: KERRY Drain, total output 100 cc / shift and G-tube/J-tube, total output 25 cc /  shift    Wound Vac CONTINOUS THERAPY @125. 550cc SEROUS OUTPUT/SHIFT. seal intact, no alarms. no leaking or bleeding at site. abdomen soft.    "   LACTICS Q4/ ABG Q4/ RFP Q6/ Accuchecks Q4    Skin: see skin flowsheet for details. Foams to geoff heels with heel boots in place. Pt unable to tolerate turns d/t open belly, however slight weight shifts provided. Pt on waffle mattress, inflated. Pt still having mottled discolration to RUE, team aware. Pt has a thrombus on radial artery. The discoloration to hand however has improved with nitro.

## 2020-09-06 NOTE — ASSESSMENT & PLAN NOTE
JACQUE most likely secondary to hypoperfusion.  Pt also with non anion gap metabolic acidosis with respiratory compensation  R/O Rhabdomyolysis    -Start Sodium Bicarbonate 500 mL @ 100mL/hr  -Ionized Calcium   -CPK levels  -Lasix Challenge with 100 mg IV in AM  - Strict I/O and chart   - Renally dose all medications   - Avoid nephrotoxic medications, NSAIDs, IV contrast, ACE/ARB.  - Maintain MAP > 65  - Hb > 7 gm/dL   - Will follow closely

## 2020-09-06 NOTE — CONSULTS
[facility]  Nephrology  Consult Note    Patient Name: Elda Hernandez  MRN: [id number]  Admission Date: 9/3/2020  Hospital Length of Stay: 2 days  Attending Provider: Clark Rodriguez MD   Primary Care Physician: Jordana Woods MD  Principal Problem:Perforated abdominal viscus    Inpatient consult to Nephrology  Consult performed by: Checo Coyne MD  Consult ordered by: Ron Grady MD  Reason for consult: JACQUE        Subjective:     HPI: 50 y.o. female that has a past medical history of Angiolipoma of kidney (10/1/2018), Arnold-Chiari malformation, Depression, Esophageal stricture, Essential tremor, Hypertension, Liver fibrosis, Osteoporosis, Recurrent urinary tract infection, Seizures, SIADH (syndrome of inappropriate ADH production), Squamous cell carcinoma (10/2014), and Von Gierke disease s/p liver transplant (2002, on tacro + MMF), HTN, and depression that presented to OSH due to worsening diffuse abdominal for 3 weeks, associated with nausea, vomiting and decreased PO intake. Abdominal CT scan showed gastric outlet narrowing, obstructive-related changes at pyloric-duodenal junction, and thickening of GE junction. EGD on 9/3 found with grade IV esophagitis with stricture. Pt transferred for GI evaluation and found with hypotension requiring vasopressors and lactic Acid of 3.9. Repeat CT showed decompressed abdomen and evidence of free air. Exploratory laparotomy performed and found with stomach perforation s/p washout, gastric perforation repair, and gastrostomy tube placement.  Nephrology consulted for JACQUE      Review of patient's allergies indicates:   Allergen Reactions    Codeine Itching     Other reaction(s): Itching    Lipitor [atorvastatin] Other (See Comments)     Other reaction(s): Muscle pain  Muscle cranmps    Morphine Itching     Other reaction(s): nausea and vomiting     Zoloft [sertraline] Other (See Comments)     Tremors/muscle spasms     Current  Facility-Administered Medications   Medication Frequency    0.9%  NaCl infusion (for blood administration) Q24H PRN    0.9%  NaCl infusion (for blood administration) Q24H PRN    0.9%  NaCl infusion (for blood administration) Q24H PRN    albumin human 25% bottle 25 g Q8H    calcium gluconate 1g in dextrose 5% 100mL (ready to mix system) PRN    calcium gluconate 2 g in dextrose 5 % 100 mL IVPB PRN    calcium gluconate 3 g in dextrose 5 % 100 mL IVPB PRN    dextrose 50% injection 12.5 g PRN    dextrose 50% injection 25 g PRN    fentaNYL 2500 mcg in 0.9% sodium chloride 250 mL infusion premix (titrating) Continuous    fentaNYL injection 25 mcg Q1H PRN    glucagon (human recombinant) injection 1 mg PRN    glucose chewable tablet 16 g PRN    glucose chewable tablet 24 g PRN    hydrocortisone sodium succinate injection 100 mg Q8H    lactated ringers infusion Continuous    magnesium sulfate 2g in water 50mL IVPB (premix) PRN    magnesium sulfate 2g in water 50mL IVPB (premix) PRN    melatonin tablet 6 mg Nightly PRN    nitroGLYCERIN 2% TD oint ointment 0.5 inch Q6H    norepinephrine 16 mg in dextrose 5 % 250 mL infusion Continuous    ondansetron injection 4 mg Q8H PRN    oxyCODONE immediate release tablet 5 mg Q6H PRN    pantoprazole injection 40 mg Daily    piperacillin-tazobactam 4.5 g in sodium chloride 0.9% 100 mL IVPB (ready to mix system) Q8H    potassium chloride 40 mEq in 100 mL IVPB (FOR CENTRAL LINE ADMINISTRATION ONLY) PRN    And    potassium chloride 20 mEq in 100 mL IVPB (FOR CENTRAL LINE ADMINISTRATION ONLY) PRN    And    potassium chloride 40 mEq in 100 mL IVPB (FOR CENTRAL LINE ADMINISTRATION ONLY) PRN    promethazine tablet 25 mg Q6H PRN    propofol (DIPRIVAN) 10 mg/mL infusion Continuous    sodium bicarbonate 8.4 % (1 mEq/mL) injection     sodium chloride 0.9% flush 10 mL PRN    sodium chloride 0.9% flush 10 mL PRN    sodium phosphate 15 mmol in dextrose 5 % 250 mL IVPB  PRN    sodium phosphate 20.01 mmol in dextrose 5 % 250 mL IVPB PRN    sodium phosphate 30 mmol in dextrose 5 % 250 mL IVPB PRN    vasopressin (PITRESSIN) 0.2 Units/mL in dextrose 5 % 100 mL infusion Continuous       Objective:     Vital Signs (Most Recent):  Temp: 98.9 °F (37.2 °C) (09/05/20 1110)  Pulse: 110 (09/05/20 1445)  Resp: (!) 22 (09/05/20 1110)  BP: (!) 118/55 (09/05/20 1400)  SpO2: 100 % (09/05/20 1445)  O2 Device (Oxygen Therapy): ventilator (09/05/20 1400) Vital Signs (24h Range):  Temp:  [97.5 °F (36.4 °C)-98.9 °F (37.2 °C)] 98.9 °F (37.2 °C)  Pulse:  [] 110  Resp:  [0-25] 22  SpO2:  [91 %-100 %] 100 %  BP: ()/(35-83) 118/55  Arterial Line BP: ()/(37-93) 167/49     Weight: 56 kg (123 lb 7.3 oz) (09/04/20 1905)  Body mass index is 24.94 kg/m².  Body surface area is 1.53 meters squared.    I/O last 3 completed shifts:  In: 67934.8 [I.V.:4257.8; IV Piggyback:6200]  Out: 2766 [Urine:1068; Drains:448; Other:1250]    Physical Exam  Vitals signs reviewed.   Constitutional:       Appearance: She is normal weight.      Comments: sedated   HENT:      Head: Normocephalic and atraumatic.      Mouth/Throat:      Comments: ET on MV  Neck:      Comments: RIJ central line  Cardiovascular:      Rate and Rhythm: Regular rhythm. Tachycardia present.      Heart sounds: Normal heart sounds. No murmur.   Pulmonary:      Breath sounds: Normal breath sounds. No wheezing or rhonchi.      Comments: Intubated and mechanically ventilated  Abdominal:      Comments: G-tube.  Wound vac         Significant Labs:  ABGs:   Recent Labs   Lab 09/05/20  1200   PH 7.262*   PCO2 35.9   HCO3 16.2*   POCSATURATED 75*   BE -11     CBC:   Recent Labs   Lab 09/05/20 0358   WBC 7.96   RBC 3.24*   HGB 9.0*   HCT 27.6*      MCV 85   MCH 27.8   MCHC 32.6     CMP:   Recent Labs   Lab 09/05/20 0358   GLU 66*   CALCIUM 6.7*   ALBUMIN 1.0*   PROT 2.9*      K 3.5   CO2 15*   *   BUN 26*   CREATININE 1.6*    ALKPHOS 96   ALT 77*   *   BILITOT 0.7     All labs within the past 24 hours have been reviewed.     Assessment/Plan:     JACQUE (acute kidney injury)  JACQUE most likely secondary to hypoperfusion.  Pt also with non anion gap metabolic acidosis with respiratory compensation  R/O Rhabdomyolysis    -Start Sodium Bicarbonate 500 mL @ 100mL/hr  -Ionized Calcium   -CPK levels  -Lasix Challenge with 100 mg IV in AM  - Strict I/O and chart   - Renally dose all medications   - Avoid nephrotoxic medications, NSAIDs, IV contrast, ACE/ARB.  - Maintain MAP > 65  - Hb > 7 gm/dL   - Will follow closely       Thank you for your consult. I will follow-up with patient. Please contact us if you have any additional questions.    Checo Coyne MD  Nephrology  [facility]

## 2020-09-06 NOTE — PROGRESS NOTES
[facility] [address]  Nephrology  Consult Note    Patient Name: Elda Hernandez  MRN: [id number]  Admission Date: 9/3/2020  Hospital Length of Stay: 3 days  Attending Provider: Clark Rodriguez MD   Primary Care Physician: Jordana Woods MD  Principal Problem:Perforated abdominal viscus    Consults  Subjective:     HPI: 50 y.o. female that has a past medical history of Angiolipoma of kidney (10/1/2018), Arnold-Chiari malformation, Depression, Esophageal stricture, Essential tremor, Hypertension, Liver fibrosis, Osteoporosis, Recurrent urinary tract infection, Seizures, SIADH (syndrome of inappropriate ADH production), Squamous cell carcinoma (10/2014), and Von Gierke disease s/p liver transplant (2002, on tacro + MMF), HTN, and depression that presented to OSH due to worsening diffuse abdominal for 3 weeks, associated with nausea, vomiting and decreased PO intake. Abdominal CT scan showed gastric outlet narrowing, obstructive-related changes at pyloric-duodenal junction, and thickening of GE junction. EGD on 9/3 found with grade IV esophagitis with stricture. Pt transferred for GI evaluation and found with hypotension requiring vasopressors and lactic Acid of 3.9. Repeat CT showed decompressed abdomen and evidence of free air. Exploratory laparotomy performed and found with stomach perforation s/p washout, gastric perforation repair, and gastrostomy tube placement.  Nephrology consulted for JACQUE    Continues to require ICU level of care with ventilator, no significant urine output overnight.    ROS - could not be obtained.    Vitals:    09/06/20 1300 09/06/20 1315 09/06/20 1330 09/06/20 1355   BP:    (!) 89/50   BP Location:    Right arm   Patient Position:    Lying   Pulse: 66 66 67 71   Resp: 12      Temp: 97.5 °F (36.4 °C) 97.7 °F (36.5 °C) 97.7 °F (36.5 °C) 97.9 °F (36.6 °C)   TempSrc:       SpO2: 98% 98% 98% 98%   Weight:       Height:         Scheduled Meds:   heparin (PORCINE)  60 Units/kg  (Order-Specific) Intravenous Once    hydrocortisone sodium succinate  100 mg Intravenous Q8H    nitroGLYCERIN 2% TD oint  0.5 inch Topical (Top) Q6H    pantoprazole  40 mg Intravenous Daily    piperacillin-tazobactam (ZOSYN) IVPB  4.5 g Intravenous Q8H     Continuous Infusions:   fentanyl 7.5 mL/hr at 09/06/20 1400    heparin (porcine) in D5W Stopped (09/06/20 0110)    lactated ringers 150 mL/hr at 09/06/20 1400    norepinephrine bitartrate-D5W 0.02 mcg/kg/min (09/06/20 1400)    propofoL 40 mcg/kg/min (09/06/20 1400)    vasopressin (PITRESSIN) infusion 0.04 Units/min (09/06/20 1400)     PRN Meds:.calcium gluconate IVPB, calcium gluconate IVPB, calcium gluconate IVPB, dextrose 50%, dextrose 50%, fentaNYL, glucagon (human recombinant), glucose, glucose, heparin (PORCINE), heparin (PORCINE), magnesium sulfate IVPB, magnesium sulfate IVPB, melatonin, ondansetron, oxyCODONE, potassium chloride in water **AND** potassium chloride in water **AND** potassium chloride in water, promethazine, sodium chloride 0.9%, sodium chloride 0.9%, sodium phosphate IVPB, sodium phosphate IVPB, sodium phosphate IVPB    Physical Exam   Constitutional: No distress.   HENT:   Head: Normocephalic and atraumatic.   Eyes: Pupils are equal, round, and reactive to light.   Cardiovascular: Normal rate, regular rhythm and normal heart sounds.   Pulmonary/Chest: Effort normal and breath sounds normal. No respiratory distress. She has no wheezes.   Musculoskeletal:         General: Edema present.     Labs reviewed    Assessment/Plan:     1) Acute renal failure  2) Hyperchloremic non anion gap metabolic acidosis  JACQUE likely due to shock and sepsis, likely ATN now. Hyperchloremic nonanion gap metabolic acidosis is usually seen with GI losses or Normal saline infusion, in our case it is probably related to GI losses as no NS has been given here.     - No emergent need for dialysis today  - Agree with lasix challenge  - Can give 500 ml of  isotonic bicarb (75 Meq of sodium bicarb in 500 ml of sterile water).   - Strict I/O and chart   - Renally dose all medications   - Avoid nephrotoxic medications, NSAIDs, IV contrast, ACE/ARB.  - Maintain MAP > 65  - Hb > 7 gm/dL     Thank you for your consult. I will follow-up with patient. Please contact us if you have any additional questions.    Sj Hua MD  Nephrology  [facility]

## 2020-09-06 NOTE — OP NOTE
Date of procedure - 09/06/2020  Preop diagnosis - open abdomen after gastric perforation  Postop diagnosis - same  Procedure - exploratory laparotomy washout and closure of abdomen  Surgeon - Michael  Assist - Ta  Anesthesia - general  Blood loss - minimal  Indications - patient was patient was taken to the operating room emergently 2 days ago were gastric perforation was identified this was repaired in a gastrostomy tube was used for decompression this area is healing nicely the stomach was well decompressed and there is no other pathology in the abdomen noted a 2 drains in the wound VAC were all draining serous fluid  Operative report in detail - patient brought the operating placed in supine position prepped draped sterile fashion after satisfactory general anesthesia was induced  Wound VAC was removed  Two hundred nineteen [place] Adriano drains were draining serous fluid and therefore were removed  Wound VAC fluid fluid was also clear  The stomach was evaluated the gastrostomy tube was flushed and the stomach was noted to empty easily into the duodenum under visualization  Stomach was well decompressed  Decision was made to primarily close the patient's fascia with 1.  Looped PDS  Subcu was irrigated the skin was closed  With clips  Needle sponge instrument counts correct patient tolerated the procedure without incident all

## 2020-09-06 NOTE — NURSING TRANSFER
Nursing Transfer Note      9/6/2020     Transfer To: OR    Transfer via stretcher    Transfer with 15 LPM BVM to O2, cardiac monitoring    Transported by CRNA and OR RN    Medicines sent: Only IV infusions.     Chart send with patient: Yes    Notified: spouse at bedside before transfer    Patient reassessed at: N/A; sedated

## 2020-09-06 NOTE — RESPIRATORY THERAPY
Pt arrived from OR w/ No signs of respiratory distress.  Connected to vent on previous settings. Will continue to monitor.

## 2020-09-06 NOTE — SUBJECTIVE & OBJECTIVE
Medications:  Continuous Infusions:   fentanyl 75 mcg/hr (09/06/20 0800)    heparin (porcine) in D5W Stopped (09/06/20 0110)    lactated ringers 150 mL/hr at 09/06/20 0800    norepinephrine bitartrate-D5W Stopped (09/06/20 0230)    propofoL 40 mcg/kg/min (09/06/20 0800)    vasopressin (PITRESSIN) infusion 0.02 Units/min (09/06/20 0800)     Scheduled Meds:   heparin (PORCINE)  60 Units/kg (Order-Specific) Intravenous Once    hydrocortisone sodium succinate  100 mg Intravenous Q8H    nitroGLYCERIN 2% TD oint  0.5 inch Topical (Top) Q6H    pantoprazole  40 mg Intravenous Daily    piperacillin-tazobactam (ZOSYN) IVPB  4.5 g Intravenous Q8H     PRN Meds:calcium gluconate IVPB, calcium gluconate IVPB, calcium gluconate IVPB, dextrose 50%, dextrose 50%, fentaNYL, glucagon (human recombinant), glucose, glucose, heparin (PORCINE), heparin (PORCINE), magnesium sulfate IVPB, magnesium sulfate IVPB, melatonin, ondansetron, oxyCODONE, potassium chloride in water **AND** potassium chloride in water **AND** potassium chloride in water, promethazine, sodium chloride 0.9%, sodium chloride 0.9%, sodium phosphate IVPB, sodium phosphate IVPB, sodium phosphate IVPB     Objective:     Vital Signs (Most Recent):  Temp: 98.9 °F (37.2 °C) (09/06/20 0715)  Pulse: 80 (09/06/20 0731)  Resp: 16 (09/06/20 0700)  BP: (!) 87/41 (09/06/20 0700)  SpO2: 99 % (09/06/20 0731) Vital Signs (24h Range):  Temp:  [97.8 °F (36.6 °C)-98.9 °F (37.2 °C)] 98.9 °F (37.2 °C)  Pulse:  [] 80  Resp:  [16-24] 16  SpO2:  [91 %-100 %] 99 %  BP: ()/(23-98) 87/41  Arterial Line BP: ()/(40-62) 93/49     Date 09/06/20 0700 - 09/07/20 0659   Shift 6412-4837 8485-4410 0848-1306 24 Hour Total   INTAKE   I.V.(mL/kg) 541(9.3)   541(9.3)   Shift Total(mL/kg) 541(9.3)   541(9.3)   OUTPUT   Urine(mL/kg/hr) 20   20   Drains 24   24   Other 160   160   Shift Total(mL/kg) 204(3.5)   204(3.5)   Weight (kg) 58 58 58 58       Physical Exam  Vitals signs  reviewed.   Constitutional:       Comments: sedated   HENT:      Head: Normocephalic and atraumatic.      Mouth/Throat:      Comments: intubated  Neck:      Comments: RIJ central line  Cardiovascular:      Rate and Rhythm: Regular rhythm. Tachycardia present.      Heart sounds: Normal heart sounds. No murmur.   Pulmonary:      Breath sounds: Normal breath sounds. No wheezing or rhonchi.      Comments: Intubated and mechanically ventilated  Abdominal:      Comments: G-tube.  Wound vac   Skin:     Comments: R arm and both legs looking much better since yesterday.  A-line has been removed and placed in groin.         Significant Labs:  CBC:   Recent Labs   Lab 09/06/20  0507   WBC 6.29  6.29   RBC 3.23*  3.23*   HGB 9.6*  9.6*   HCT 28.8*  28.8*   PLT 91*  91*   MCV 89  89   MCH 29.7  29.7   MCHC 33.3  33.3     CMP:   Recent Labs   Lab 09/06/20  0507   *   CALCIUM 7.9*   ALBUMIN 3.3*  3.3*   PROT 5.0*  5.0*      K 4.3   CO2 17*      BUN 34*   CREATININE 2.1*   ALKPHOS 88  88   *  118*   *  293*   BILITOT 3.1*  3.1*       Significant Diagnostics:  I have reviewed all pertinent imaging results/findings within the past 24 hours.

## 2020-09-06 NOTE — PROGRESS NOTES
[facility]  General Surgery  Progress Note    Subjective:     History of Present Illness:  No notes on file    Post-Op Info:  Procedure(s) (LRB):  LAPAROTOMY, EXPLORATORY (N/A)   2 Days Post-Op     Interval History: Hg this AM 5.6. Patient received 2U pRBCs and repeat Hg 9.6. unclear source for bleeding. Weaned pressors overnight. Only on 0.02 of vaso this AM. Heparin gtt started yesterday for R radial artery thrombosis per vascular surgery. To the OR this AM for 2nd look and possible closure.     Medications:  Continuous Infusions:   fentanyl 75 mcg/hr (09/06/20 0900)    heparin (porcine) in D5W Stopped (09/06/20 0110)    lactated ringers 150 mL/hr at 09/06/20 0900    norepinephrine bitartrate-D5W Stopped (09/06/20 0230)    propofoL 40 mcg/kg/min (09/06/20 0900)    vasopressin (PITRESSIN) infusion 0.02 Units/min (09/06/20 0900)     Scheduled Meds:   heparin (PORCINE)  60 Units/kg (Order-Specific) Intravenous Once    hydrocortisone sodium succinate  100 mg Intravenous Q8H    nitroGLYCERIN 2% TD oint  0.5 inch Topical (Top) Q6H    pantoprazole  40 mg Intravenous Daily    piperacillin-tazobactam (ZOSYN) IVPB  4.5 g Intravenous Q8H     PRN Meds:calcium gluconate IVPB, calcium gluconate IVPB, calcium gluconate IVPB, dextrose 50%, dextrose 50%, fentaNYL, glucagon (human recombinant), glucose, glucose, heparin (PORCINE), heparin (PORCINE), magnesium sulfate IVPB, magnesium sulfate IVPB, melatonin, ondansetron, oxyCODONE, potassium chloride in water **AND** potassium chloride in water **AND** potassium chloride in water, promethazine, sodium chloride 0.9%, sodium chloride 0.9%, sodium phosphate IVPB, sodium phosphate IVPB, sodium phosphate IVPB     Review of patient's allergies indicates:   Allergen Reactions    Codeine Itching     Other reaction(s): Itching    Lipitor [atorvastatin] Other (See Comments)     Other reaction(s): Muscle pain  Muscle cranmps    Morphine Itching     Other  reaction(s): nausea and vomiting     Zoloft [sertraline] Other (See Comments)     Tremors/muscle spasms     Objective:     Vital Signs (Most Recent):  Temp: 98.9 °F (37.2 °C) (09/06/20 0715)  Pulse: 71 (09/06/20 0900)  Resp: 16 (09/06/20 0700)  BP: (!) 92/52 (09/06/20 0900)  SpO2: 100 % (09/06/20 0900) Vital Signs (24h Range):  Temp:  [97.8 °F (36.6 °C)-98.9 °F (37.2 °C)] 98.9 °F (37.2 °C)  Pulse:  [] 71  Resp:  [16-24] 16  SpO2:  [91 %-100 %] 100 %  BP: ()/(23-98) 92/52  Arterial Line BP: ()/(40-98) 104/60     Weight: 58 kg (127 lb 13.9 oz)  Body mass index is 25.83 kg/m².    Intake/Output - Last 3 Shifts       09/04 0700 - 09/05 0659 09/05 0700 - 09/06 0659 09/06 0700 - 09/07 0659    I.V. (mL/kg) 4257.8 (76) 5845.8 (100.8) 541 (9.3)    Blood  700     IV Piggyback 6200      Total Intake(mL/kg) 73627.8 (186.7) 6545.8 (112.9) 541 (9.3)    Urine (mL/kg/hr) 1045 (0.8) 427 (0.3) 23 (0.2)    Drains 330 614 24    Other 1250 650 160    Total Output 2625 1691 207    Net +7832.8 +4854.8 +334                 Physical Exam  Constitutional:       Comments: Intubated/Sedated   HENT:      Head: Normocephalic and atraumatic.   Cardiovascular:      Rate and Rhythm: Normal rate and regular rhythm.   Pulmonary:      Effort: Pulmonary effort is normal. No respiratory distress.      Comments: Vent Mode: A/C  Oxygen Concentration (%):  (40-50) 40  Resp Rate Total:  (16 br/min-24 br/min) 16 br/min  Vt Set:  (330 mL) 330 mL  PEEP/CPAP:  (5 cmH20) 5 cmH20  Mean Airway Pressure:  (8 cmH20-9 cmH20) 8 cmH20  Abdominal:      Palpations: Abdomen is soft.      Comments: ABThera wound vac in place with serous output   Skin:     General: Skin is warm and dry.      Comments: Mottling improving   Neurological:      Comments: Sedated         Significant Labs:  CBC:   Recent Labs   Lab 09/06/20  0507   WBC 6.29  6.29   RBC 3.23*  3.23*   HGB 9.6*  9.6*   HCT 28.8*  28.8*   PLT 91*  91*   MCV 89  89   MCH 29.7  29.7   MCHC  33.3  33.3     CMP:   Recent Labs   Lab 09/06/20  0507   *   CALCIUM 7.9*   ALBUMIN 3.3*  3.3*   PROT 5.0*  5.0*      K 4.3   CO2 17*      BUN 34*   CREATININE 2.1*   ALKPHOS 88  88   *  118*   *  293*   BILITOT 3.1*  3.1*     LFTs:   Recent Labs   Lab 09/06/20  0507   *  118*   *  293*   ALKPHOS 88  88   BILITOT 3.1*  3.1*   PROT 5.0*  5.0*   ALBUMIN 3.3*  3.3*     Coagulation:   Recent Labs   Lab 09/06/20  0507   LABPROT 14.6*   INR 1.3*   APTT 57.2*       Significant Diagnostics:  I have reviewed all pertinent imaging results/findings within the past 24 hours.    Assessment/Plan:     * Perforated abdominal viscus  50 y.o. female w/ free air on CT scan. S/p emergent ex lap w/ findings of gastric perforation. Closed primarily. ABTthera in place.    Continue SICU care  RTOR today for possible closure. Informed consent obtain. Covid test negative 9/4  Cont to wean pressors  Vascular surgery consulted for ischemia of the right hand. Improving. Started on heparin gtt    Remains critically ill in the SICU         Rocio Hidalgo MD  General Surgery  [facility]

## 2020-09-06 NOTE — ASSESSMENT & PLAN NOTE
50 y.o. female w/ free air on CT scan. S/p emergent ex lap w/ findings of gastric perforation. Closed primarily. ABTthera in place.    Continue SICU care  RTOR today for possible closure. Informed consent obtain. Covid test negative 9/4  Cont to wean pressors  Vascular surgery consulted for ischemia of the right hand. Improving. Started on heparin gtt    Remains critically ill in the SICU

## 2020-09-06 NOTE — PROGRESS NOTES
[facility]  Vascular Surgery  Progress Note    Patient Name: Elda Hernandez  MRN: [id number]  Admission Date: 9/3/2020  Primary Care Provider: Jordana Woods MD    Subjective:     Interval History: Did well overnight with blood and heparin gtt.  Hand looks much better.    Post-Op Info:  Procedure(s) (LRB):  LAPAROTOMY, EXPLORATORY (N/A)   2 Days Post-Op       Medications:  Continuous Infusions:   fentanyl 75 mcg/hr (09/06/20 0800)    heparin (porcine) in D5W Stopped (09/06/20 0110)    lactated ringers 150 mL/hr at 09/06/20 0800    norepinephrine bitartrate-D5W Stopped (09/06/20 0230)    propofoL 40 mcg/kg/min (09/06/20 0800)    vasopressin (PITRESSIN) infusion 0.02 Units/min (09/06/20 0800)     Scheduled Meds:   heparin (PORCINE)  60 Units/kg (Order-Specific) Intravenous Once    hydrocortisone sodium succinate  100 mg Intravenous Q8H    nitroGLYCERIN 2% TD oint  0.5 inch Topical (Top) Q6H    pantoprazole  40 mg Intravenous Daily    piperacillin-tazobactam (ZOSYN) IVPB  4.5 g Intravenous Q8H     PRN Meds:calcium gluconate IVPB, calcium gluconate IVPB, calcium gluconate IVPB, dextrose 50%, dextrose 50%, fentaNYL, glucagon (human recombinant), glucose, glucose, heparin (PORCINE), heparin (PORCINE), magnesium sulfate IVPB, magnesium sulfate IVPB, melatonin, ondansetron, oxyCODONE, potassium chloride in water **AND** potassium chloride in water **AND** potassium chloride in water, promethazine, sodium chloride 0.9%, sodium chloride 0.9%, sodium phosphate IVPB, sodium phosphate IVPB, sodium phosphate IVPB     Objective:     Vital Signs (Most Recent):  Temp: 98.9 °F (37.2 °C) (09/06/20 0715)  Pulse: 80 (09/06/20 0731)  Resp: 16 (09/06/20 0700)  BP: (!) 87/41 (09/06/20 0700)  SpO2: 99 % (09/06/20 0731) Vital Signs (24h Range):  Temp:  [97.8 °F (36.6 °C)-98.9 °F (37.2 °C)] 98.9 °F (37.2 °C)  Pulse:  [] 80  Resp:  [16-24] 16  SpO2:  [91 %-100 %] 99 %  BP: ()/(23-98) 87/41  Arterial  Line BP: ()/(40-62) 93/49     Date 09/06/20 0700 - 09/07/20 0659   Shift 7720-9197 6950-2169 2755-1870 24 Hour Total   INTAKE   I.V.(mL/kg) 541(9.3)   541(9.3)   Shift Total(mL/kg) 541(9.3)   541(9.3)   OUTPUT   Urine(mL/kg/hr) 20   20   Drains 24   24   Other 160   160   Shift Total(mL/kg) 204(3.5)   204(3.5)   Weight (kg) 58 58 58 58       Physical Exam  Vitals signs reviewed.   Constitutional:       Comments: sedated   HENT:      Head: Normocephalic and atraumatic.      Mouth/Throat:      Comments: intubated  Neck:      Comments: RIJ central line  Cardiovascular:      Rate and Rhythm: Regular rhythm. Tachycardia present.      Heart sounds: Normal heart sounds. No murmur.   Pulmonary:      Breath sounds: Normal breath sounds. No wheezing or rhonchi.      Comments: Intubated and mechanically ventilated  Abdominal:      Comments: G-tube.  Wound vac   Skin:     Comments: R arm and both legs looking much better since yesterday.  A-line has been removed and placed in groin.         Significant Labs:  CBC:   Recent Labs   Lab 09/06/20  0507   WBC 6.29  6.29   RBC 3.23*  3.23*   HGB 9.6*  9.6*   HCT 28.8*  28.8*   PLT 91*  91*   MCV 89  89   MCH 29.7  29.7   MCHC 33.3  33.3     CMP:   Recent Labs   Lab 09/06/20  0507   *   CALCIUM 7.9*   ALBUMIN 3.3*  3.3*   PROT 5.0*  5.0*      K 4.3   CO2 17*      BUN 34*   CREATININE 2.1*   ALKPHOS 88  88   *  118*   *  293*   BILITOT 3.1*  3.1*       Significant Diagnostics:  I have reviewed all pertinent imaging results/findings within the past 24 hours.    Assessment/Plan:     Ischemia of right upper extremity  50 year old female with sepsis from perforated gastric ulcer, concern for ischemic right hand.      -RUE U/S with occluded radial, open ulnar.  Has strong ulnar and palmar arch signal and hand looks much better since heparin gtt and blood were given yesterday  -Keep on heparin gtt as able to, once clincally appropriate  anticoagulate to help with recannulization  -Will sign off, please call if any new concerns to arm        Antony Portillo MD  Vascular Surgery  [facility]    Vascular Attending  Agree with above  Pt seen with fellow 9/5/50 in PM  Nitropaste to hand  Iv heparin when able to do so - ideally will need a/c x 6 weeks to recanalize radial artery    Atul Gonzalez MD DFSVS FACS   Vascular/Endovascular Surgery

## 2020-09-06 NOTE — SUBJECTIVE & OBJECTIVE
Interval History/Significant Events: Patient remains intubated, sedated, on and off pressors. Fluid resuscitated. Hydrocortisone started yesterday. Levo and vaso weaned on and off overnight. Heparin gtt started for R radial artery thrombosis. Hgb drop from 9.0 to 5.6. 2 U pRBC given and responded to 9.6. Heparin gtt discontinued. Platelet drop. Increased Bili. Increased aPTT to 57, INR 1.3. UOP leveled around 15cc/hr overnight. Plan for OR today for washout.     Follow-up For: Procedure(s) (LRB):  LAPAROTOMY, EXPLORATORY (N/A)    Post-Operative Day: 2 Day Post-Op    Objective:     Vital Signs (Most Recent):  Temp: 98.9 °F (37.2 °C) (09/06/20 0715)  Pulse: 80 (09/06/20 0731)  Resp: 16 (09/06/20 0700)  BP: (!) 87/41 (09/06/20 0700)  SpO2: 99 % (09/06/20 0731) Vital Signs (24h Range):  Temp:  [97.8 °F (36.6 °C)-98.9 °F (37.2 °C)] 98.9 °F (37.2 °C)  Pulse:  [] 80  Resp:  [16-24] 16  SpO2:  [91 %-100 %] 99 %  BP: ()/(23-98) 87/41  Arterial Line BP: ()/(40-62) 93/49     Weight: 58 kg (127 lb 13.9 oz)  Body mass index is 25.83 kg/m².      Intake/Output Summary (Last 24 hours) at 9/6/2020 0818  Last data filed at 9/6/2020 0700  Gross per 24 hour   Intake 6545.76 ml   Output 1540 ml   Net 5005.76 ml       Physical Exam  Vitals signs reviewed.   Constitutional:       Comments: sedated   HENT:      Head: Normocephalic and atraumatic.      Mouth/Throat:      Comments: intubated  Neck:      Comments: RIJ central line  Cardiovascular:      Rate and Rhythm: Regular rhythm. Tachycardia present.      Heart sounds: Normal heart sounds. No murmur.   Pulmonary:      Breath sounds: Normal breath sounds. No wheezing or rhonchi.      Comments: Intubated and mechanically ventilated  Abdominal:      Comments: CAROLYN-tube.  Wound vac   Skin:     Comments: Slightly pale, mottled appearance most apparent in R arm and groin.  Considerably improved from yesterday         Vents:  Vent Mode: A/C (09/06/20 0715)  Ventilator Initiated:  Yes (09/04/20 1130)  Set Rate: 16 BPM (09/06/20 0715)  Vt Set: 330 mL (09/06/20 0715)  PEEP/CPAP: 5 cmH20 (09/06/20 0715)  Oxygen Concentration (%): 40 (09/06/20 0715)  Peak Airway Pressure: 19 cmH2O (09/06/20 0715)  Plateau Pressure: 17 cmH20 (09/06/20 0715)  Total Ve: 5.27 mL (09/06/20 0715)  F/VT Ratio<105 (RSBI): (!) 66.07 (09/05/20 1635)    Lines/Drains/Airways     Central Venous Catheter Line            Percutaneous Central Line Insertion/Assessment - Triple Lumen  09/04/20 0800 right internal jugular 2 days          Drain                 Gastrostomy/Enterostomy 09/04/20 LUQ decompression 2 days         Closed/Suction Drain 09/04/20 1301 Abdomen Bulb 19 Fr. 1 day         Closed/Suction Drain 09/04/20 1302 Abdomen Bulb 19 Fr. 1 day         Urethral Catheter 09/04/20 1300 Non-latex;Straight-tip 18 Fr. 1 day          Airway                 Airway - Non-Surgical 09/04/20 1122 Endotracheal Tube 1 day          Arterial Line            Arterial Line 09/04/20 1819 Left Femoral 1 day          Peripheral Intravenous Line                 Peripheral IV - Single Lumen Right Forearm -- days                Significant Labs:    CBC/Anemia Profile:  Recent Labs   Lab 09/05/20  2316 09/06/20  0010 09/06/20  0507   WBC 5.69 4.96 6.29  6.29   HGB 5.6* 5.6* 9.6*  9.6*   HCT 17.4* 17.4* 28.8*  28.8*   PLT 73* 71* 91*  91*   MCV 87 87 89  89   RDW 15.2* 15.2* 14.7*  14.7*        Chemistries:  Recent Labs   Lab 09/04/20  1335 09/05/20  0358  09/05/20  1745 09/05/20  2338 09/06/20  0507    138  --  138  --  137   K 4.2 3.5  --  4.4  --  4.3   * 115*  --  108  --  108   CO2 16* 15*  --  18*  --  17*   BUN 28* 26*  --  31*  --  34*   CREATININE 1.8* 1.6*  --  2.2*  --  2.1*   CALCIUM 7.9* 6.7*  --  7.8*  --  7.9*   ALBUMIN 1.3* 1.0*   < > 2.9*  2.9* 3.3* 3.3*  3.3*   PROT 3.4* 2.9*   < > 4.5* 4.8* 5.0*  5.0*   BILITOT 0.5 0.7   < > 1.1* 1.4* 3.1*  3.1*   ALKPHOS 121 96   < > 80 74 88  88   ALT 59* 77*   < > 107*  117* 118*  118*   * 118*   < > 204* 265* 293*  293*   MG 1.9 1.2*  --   --   --  2.5   PHOS 2.9 3.4  --  3.5  --  4.5    < > = values in this interval not displayed.       All pertinent labs within the past 24 hours have been reviewed.    Significant Imaging:  I have reviewed all pertinent imaging results/findings within the past 24 hours.

## 2020-09-06 NOTE — TRANSFER OF CARE
"Anesthesia Transfer of Care Note    Patient: Elda Hernandez    Procedure(s) Performed: Procedure(s) (LRB):  LAPAROTOMY, EXPLORATORY with abd closure (N/A)    Patient location: ICU    Anesthesia Type: general    Transport from OR: Transported from OR intubated on 100% O2 by AMBU with assisted ventilation. Upon arrival to PACU/ICU, patient attached to ventilator and auscultated to confirm bilateral breath sounds and adequate TV. Continuous ECG monitoring in transport. Continuous SpO2 monitoring in transport. Continuos invasive BP monitoring in transport    Post pain: adequate analgesia    Post assessment: no apparent anesthetic complications and tolerated procedure well    Post vital signs: stable    Level of consciousness: sedated    Nausea/Vomiting: no nausea/vomiting    Complications: none    Transfer of care protocol was followed      Last vitals:   Visit Vitals  BP 93/64 (BP Location: Left leg, Patient Position: Lying)   Pulse 67   Temp 37.2 °C (98.9 °F) (Oral)   Resp 16   Ht 4' 11" (1.499 m)   Wt 58 kg (127 lb 13.9 oz)   SpO2 100%   BMI 25.83 kg/m²     "

## 2020-09-06 NOTE — TREATMENT PLAN
Hepatology Treatment Plan    Elda Hernandez is a 50 y.o. female admitted to hospital 9/3/2020 (Hospital Day: 4) due to Perforated abdominal viscus. Hepatology following for immunosuppression management.    Lab Results   Component Value Date    TACROLIMUS 2.3 (L) 09/06/2020    TACROLIMUS 3.0 (L) 09/05/2020    TACROLIMUS 5.7 09/04/2020       Lab Results   Component Value Date    CREATININE 2.1 (H) 09/06/2020    CREATININE 2.2 (H) 09/05/2020    CREATININE 1.6 (H) 09/05/2020       Lab Results   Component Value Date     (H) 09/06/2020     (H) 09/06/2020     (H) 09/06/2020     (H) 09/06/2020     (H) 04/07/2018    ALKPHOS 88 09/06/2020    ALKPHOS 88 09/06/2020    BILITOT 3.1 (H) 09/06/2020    BILITOT 3.1 (H) 09/06/2020    WBC 6.29 09/06/2020    WBC 6.29 09/06/2020    HGB 9.6 (L) 09/06/2020    HGB 9.6 (L) 09/06/2020    PLT 91 (L) 09/06/2020    PLT 91 (L) 09/06/2020       Kidney function is worsening  Liver enzymes are worsening    Plan  - Please get Liver U/S with Doppler given elevated LFTs  - Changes to immunosuppression regimen as below  Immunosuppression Regimen:  Tacrolimus: continue to hold  CellCept: continue to hold  Currently on hydrocortisone per SICU    Please notify hepatology on day of discharge to discuss discharge medications and follow up.     Please obtain daily CBC, BMP, LFT, INR, immunosuppressant trough level    Thank you for involving us in the care of Elda Hernandez. Please call with any additional concerns or questions.    Js Matias MD  Gastroenterology Fellow

## 2020-09-06 NOTE — CONSULTS
"  [facility]  Adult Nutrition  Consult Note    SUMMARY     Recommendations    Recommendation:  1. If pt remains NPO suggest TF of Impact peptide @ 10 mL/hr increase to goal rate of 30 mL/hr to provide pt with 1080 kcal, 68 g protein and 554 mL free water.    -Additional water per MD. Hold for residuals >500 mL.   2. If extuabted and able to advance diet, suggest regular texture per SLP   3. Suggest Vitamin C, zinc and MVI to aid in wound healing; Add Sean BID if TF started or PO diet   RD to monitor and follow up    Goals: pt to tolerate >85% of EEN/EPN by RD follow up  Nutrition Goal Status: new  Communication of RD Recs: other (comment)(POC)    Reason for Assessment    Reason For Assessment: consult  Diagnosis: (gastric outlet obstruction)  Relevant Medical History: Esophageal stricture; HTN; Liver fibrosis; Seizures; SCC; HTN; S/p liver tx  Interdisciplinary Rounds: did not attend  General Information Comments: Pt intubated on vent, s/p ex lap and G tube, with wound vac in place. Family in room reported PTA pt with poor PO intake only tolerating boost for weeks and recent wt loss of ~15 lbs. Partial NFPE completed, pt with edema. Meets criteria for moderate malnutrition at this time.  Nutrition Discharge Planning: unable to determine    Nutrition Risk Screen    Nutrition Risk Screen: no indicators present    Nutrition/Diet History    Food Allergies: NKFA  Factors Affecting Nutritional Intake: NPO, on mechanical ventilation    Anthropometrics    Temp: 98.9 °F (37.2 °C)  Height Method: Stated  Height: 4' 11" (149.9 cm)  Height (inches): 59 in  Weight Method: Bed Scale  Weight: 58 kg (127 lb 13.9 oz)  Weight (lb): 127.87 lb  Ideal Body Weight (IBW), Female: 95 lb  % Ideal Body Weight, Female (lb): 129.96 %  BMI (Calculated): 25.8  BMI Grade: 25 - 29.9 - overweight    Lab/Procedures/Meds    Pertinent Labs Reviewed: reviewed  Pertinent Labs Comments: BUN  34; Cr 2.1; Glucose 117; Ca 7.9  Pertinent " Medications Reviewed: reviewed  Pertinent Medications Comments: pantoprazole; heparin     Estimated/Assessed Needs    Weight Used For Calorie Calculations: 58 kg (127 lb 13.9 oz)  Energy Calorie Requirements (kcal): 1209 kcal/d  Energy Need Method: [place]  Protein Requirements: 59-87 g/day 1  Weight Used For Protein Calculations: 58 kg (127 lb 13.9 oz)  Fluid Requirements (mL): 1 mL/kcal or per MD  RDA Method (mL): 1209    Nutrition Prescription Ordered    Current Diet Order: NPO    Evaluation of Received Nutrient/Fluid Intake    Other Calories (kcal): 353(propofol)  I/O: +12.6 L since admit  Energy Calories Required: not meeting needs  Protein Required: not meeting needs  Fluid Required: other (see comments)  Comments: no BM recorded  Tolerance: tolerating  % Intake of Estimated Energy Needs: 0 - 25 %  % Meal Intake: NPO    Nutrition Risk    Level of Risk/Frequency of Follow-up: high     Assessment and Plan  Nutrition Problem:   Moderate Protein-Calorie Malnutrition  Malnutrition in the context of Acute Illness/Injury    Related to (etiology):  Decreased intake     Signs and Symptoms (as evidenced by):  Energy Intake: <50% of estimated energy requirement for >3 weeks   Muscle Mass Depletion: mild depletion of temples   Weight Loss: 11% x 3 weeks per family   Fluid Accumulation: moderate    Interventions(treatment strategy):  Collaboration of care with other providers    Nutrition Diagnosis Status:  New    Monitor and Evaluation    Food and Nutrient Intake: energy intake, food and beverage intake, enteral nutrition intake  Food and Nutrient Adminstration: enteral and parenteral nutrition administration, diet order  Knowledge/Beliefs/Attitudes: food and nutrition knowledge/skill  Anthropometric Measurements: weight, weight change  Biochemical Data, Medical Tests and Procedures: electrolyte and renal panel, gastrointestinal profile, glucose/endocrine profile, inflammatory profile, lipid profile  Nutrition-Focused  Physical Findings: overall appearance     Malnutrition Assessment  Malnutrition Type: acute illness or injury  Weight Loss (Malnutrition): (11% x 3 weeks)  Energy Intake (Malnutrition): less than or equal to 50% for greater than or equal to 1 month   Orbital Region (Subcutaneous Fat Loss): mild depletion  Upper Arm Region (Subcutaneous Fat Loss): well nourished   [place]avian Region (Muscle Loss): mild depletion  Clavicle Bone Region (Muscle Loss): well nourished  Clavicle and Acromion Bone Region (Muscle Loss): well nourished     Nutrition Follow-Up    RD Follow-up?: Yes

## 2020-09-06 NOTE — ASSESSMENT & PLAN NOTE
50 year old female with sepsis from perforated gastric ulcer, concern for ischemic right hand.      -RUE U/S with occluded radial, open ulnar.  Has strong ulnar and palmar arch signal and hand looks much better since heparin gtt and blood were given yesterday  -Keep on heparin gtt as able to, once clincally appropriate anticoagulate to help with recannulization  -Will sign off, please call if any new concerns to arm

## 2020-09-06 NOTE — ANESTHESIA POSTPROCEDURE EVALUATION
Anesthesia Post Evaluation    Patient: Elda Hernandez    Procedure(s) Performed: Procedure(s) (LRB):  LAPAROTOMY, EXPLORATORY with abd closure (N/A)    Final Anesthesia Type: general    Patient location during evaluation: ICU  Patient participation: No - Unable to Participate, Sedation  Level of consciousness: sedated  Post-procedure vital signs: reviewed and stable  Pain management: adequate  Airway patency: patent    PONV status at discharge: No PONV  Anesthetic complications: no      Cardiovascular status: blood pressure returned to baseline, hemodynamically stable and stable  Respiratory status: intubated  Hydration status: euvolemic  Follow-up not needed.    Transported with invasive bp monitoring stable       Vitals Value Taken Time   /49 09/06/20 1149   Temp 36.5 °C (97.7 °F) 09/06/20 1331   Pulse 65 09/06/20 1331   Resp 12 09/06/20 1300   SpO2 99 % 09/06/20 1331   Vitals shown include unvalidated device data.      No case tracking events are documented in the log.      Pain/Ector Score: Pain Rating Prior to Med Admin: 0 (9/6/2020  1:10 AM)

## 2020-09-06 NOTE — SUBJECTIVE & OBJECTIVE
Interval History: Hg this AM 5.6. Patient received 2U pRBCs and repeat Hg 9.6. unclear source for bleeding. Weaned pressors overnight. Only on 0.02 of vaso this AM. Heparin gtt started yesterday for R radial artery thrombosis per vascular surgery. To the OR this AM for 2nd look and possible closure.     Medications:  Continuous Infusions:   fentanyl 75 mcg/hr (09/06/20 0900)    heparin (porcine) in D5W Stopped (09/06/20 0110)    lactated ringers 150 mL/hr at 09/06/20 0900    norepinephrine bitartrate-D5W Stopped (09/06/20 0230)    propofoL 40 mcg/kg/min (09/06/20 0900)    vasopressin (PITRESSIN) infusion 0.02 Units/min (09/06/20 0900)     Scheduled Meds:   heparin (PORCINE)  60 Units/kg (Order-Specific) Intravenous Once    hydrocortisone sodium succinate  100 mg Intravenous Q8H    nitroGLYCERIN 2% TD oint  0.5 inch Topical (Top) Q6H    pantoprazole  40 mg Intravenous Daily    piperacillin-tazobactam (ZOSYN) IVPB  4.5 g Intravenous Q8H     PRN Meds:calcium gluconate IVPB, calcium gluconate IVPB, calcium gluconate IVPB, dextrose 50%, dextrose 50%, fentaNYL, glucagon (human recombinant), glucose, glucose, heparin (PORCINE), heparin (PORCINE), magnesium sulfate IVPB, magnesium sulfate IVPB, melatonin, ondansetron, oxyCODONE, potassium chloride in water **AND** potassium chloride in water **AND** potassium chloride in water, promethazine, sodium chloride 0.9%, sodium chloride 0.9%, sodium phosphate IVPB, sodium phosphate IVPB, sodium phosphate IVPB     Review of patient's allergies indicates:   Allergen Reactions    Codeine Itching     Other reaction(s): Itching    Lipitor [atorvastatin] Other (See Comments)     Other reaction(s): Muscle pain  Muscle cranmps    Morphine Itching     Other reaction(s): nausea and vomiting     Zoloft [sertraline] Other (See Comments)     Tremors/muscle spasms     Objective:     Vital Signs (Most Recent):  Temp: 98.9 °F (37.2 °C) (09/06/20 0715)  Pulse: 71 (09/06/20  0900)  Resp: 16 (09/06/20 0700)  BP: (!) 92/52 (09/06/20 0900)  SpO2: 100 % (09/06/20 0900) Vital Signs (24h Range):  Temp:  [97.8 °F (36.6 °C)-98.9 °F (37.2 °C)] 98.9 °F (37.2 °C)  Pulse:  [] 71  Resp:  [16-24] 16  SpO2:  [91 %-100 %] 100 %  BP: ()/(23-98) 92/52  Arterial Line BP: ()/(40-98) 104/60     Weight: 58 kg (127 lb 13.9 oz)  Body mass index is 25.83 kg/m².    Intake/Output - Last 3 Shifts       09/04 0700 - 09/05 0659 09/05 0700 - 09/06 0659 09/06 0700 - 09/07 0659    I.V. (mL/kg) 4257.8 (76) 5845.8 (100.8) 541 (9.3)    Blood  700     IV Piggyback 6200      Total Intake(mL/kg) 13287.8 (186.7) 6545.8 (112.9) 541 (9.3)    Urine (mL/kg/hr) 1045 (0.8) 427 (0.3) 23 (0.2)    Drains 330 614 24    Other 1250 650 160    Total Output 2625 1691 207    Net +7832.8 +4854.8 +334                 Physical Exam  Constitutional:       Comments: Intubated/Sedated   HENT:      Head: Normocephalic and atraumatic.   Cardiovascular:      Rate and Rhythm: Normal rate and regular rhythm.   Pulmonary:      Effort: Pulmonary effort is normal. No respiratory distress.      Comments: Vent Mode: A/C  Oxygen Concentration (%):  (40-50) 40  Resp Rate Total:  (16 br/min-24 br/min) 16 br/min  Vt Set:  (330 mL) 330 mL  PEEP/CPAP:  (5 cmH20) 5 cmH20  Mean Airway Pressure:  (8 cmH20-9 cmH20) 8 cmH20  Abdominal:      Palpations: Abdomen is soft.      Comments: ABThera wound vac in place with serous output   Skin:     General: Skin is warm and dry.      Comments: Mottling improving   Neurological:      Comments: Sedated         Significant Labs:  CBC:   Recent Labs   Lab 09/06/20  0507   WBC 6.29  6.29   RBC 3.23*  3.23*   HGB 9.6*  9.6*   HCT 28.8*  28.8*   PLT 91*  91*   MCV 89  89   MCH 29.7  29.7   MCHC 33.3  33.3     CMP:   Recent Labs   Lab 09/06/20  0507   *   CALCIUM 7.9*   ALBUMIN 3.3*  3.3*   PROT 5.0*  5.0*      K 4.3   CO2 17*      BUN 34*   CREATININE 2.1*   ALKPHOS 88  88   *   118*   *  293*   BILITOT 3.1*  3.1*     LFTs:   Recent Labs   Lab 09/06/20  0507   *  118*   *  293*   ALKPHOS 88  88   BILITOT 3.1*  3.1*   PROT 5.0*  5.0*   ALBUMIN 3.3*  3.3*     Coagulation:   Recent Labs   Lab 09/06/20  0507   LABPROT 14.6*   INR 1.3*   APTT 57.2*       Significant Diagnostics:  I have reviewed all pertinent imaging results/findings within the past 24 hours.

## 2020-09-06 NOTE — PROGRESS NOTES
[facility]  Critical Care - Surgery  Progress Note    Patient Name: Elda Hernandez  MRN: [id number]  Admission Date: 9/3/2020  Hospital Length of Stay: 3 days  Code Status: Full Code  Attending Provider: Clark Rodriguez MD  Primary Care Provider: Jordana Woods MD   Principal Problem: Perforated abdominal viscus    Subjective:     Hospital/ICU Course:  9/4: Pt admitted to SICU following ex lap for GI perf. Intubated, sedated. Wound vac in place.  Soon after arrival to unit a mottled appearance to RUE was noted.  Vascular consulted and US revealed R radial artery thrombosis.    9/5: Tachycardic..  R arm appearance greatly improved overnight.      Interval History/Significant Events: Patient remains intubated, sedated, on and off pressors. Fluid resuscitated. Hydrocortisone started yesterday. Levo and vaso weaned on and off overnight. Heparin gtt started for R radial artery thrombosis. Hgb drop from 9.0 to 5.6. 2 U pRBC given and responded to 9.6. Heparin gtt discontinued. Platelet drop. Increased Bili. Increased aPTT to 57, INR 1.3. UOP leveled around 15cc/hr overnight. Plan for OR today for washout.     Follow-up For: Procedure(s) (LRB):  LAPAROTOMY, EXPLORATORY (N/A)    Post-Operative Day: 2 Day Post-Op    Objective:     Vital Signs (Most Recent):  Temp: 98.9 °F (37.2 °C) (09/06/20 0715)  Pulse: 80 (09/06/20 0731)  Resp: 16 (09/06/20 0700)  BP: (!) 87/41 (09/06/20 0700)  SpO2: 99 % (09/06/20 0731) Vital Signs (24h Range):  Temp:  [97.8 °F (36.6 °C)-98.9 °F (37.2 °C)] 98.9 °F (37.2 °C)  Pulse:  [] 80  Resp:  [16-24] 16  SpO2:  [91 %-100 %] 99 %  BP: ()/(23-98) 87/41  Arterial Line BP: ()/(40-62) 93/49     Weight: 58 kg (127 lb 13.9 oz)  Body mass index is 25.83 kg/m².      Intake/Output Summary (Last 24 hours) at 9/6/2020 0818  Last data filed at 9/6/2020 0700  Gross per 24 hour   Intake 6545.76 ml   Output 1540 ml   Net 5005.76 ml       Physical Exam  Vitals signs reviewed.    Constitutional:       Comments: sedated   HENT:      Head: Normocephalic and atraumatic.      Mouth/Throat:      Comments: intubated  Neck:      Comments: RIJ central line  Cardiovascular:      Rate and Rhythm: Regular rhythm. Tachycardia present.      Heart sounds: Normal heart sounds. No murmur.   Pulmonary:      Breath sounds: Normal breath sounds. No wheezing or rhonchi.      Comments: Intubated and mechanically ventilated  Abdominal:      Comments: G-tube.  Wound vac   Skin:     Comments: Slightly pale, mottled appearance most apparent in R arm and groin.  Considerably improved from yesterday         Vents:  Vent Mode: A/C (09/06/20 0715)  Ventilator Initiated: Yes (09/04/20 1130)  Set Rate: 16 BPM (09/06/20 0715)  Vt Set: 330 mL (09/06/20 0715)  PEEP/CPAP: 5 cmH20 (09/06/20 0715)  Oxygen Concentration (%): 40 (09/06/20 0715)  Peak Airway Pressure: 19 cmH2O (09/06/20 0715)  Plateau Pressure: 17 cmH20 (09/06/20 0715)  Total Ve: 5.27 mL (09/06/20 0715)  F/VT Ratio<105 (RSBI): (!) 66.07 (09/05/20 1635)    Lines/Drains/Airways     Central Venous Catheter Line            Percutaneous Central Line Insertion/Assessment - Triple Lumen  09/04/20 0800 right internal jugular 2 days          Drain                 Gastrostomy/Enterostomy 09/04/20 LUQ decompression 2 days         Closed/Suction Drain 09/04/20 1301 Abdomen Bulb 19 Fr. 1 day         Closed/Suction Drain 09/04/20 1302 Abdomen Bulb 19 Fr. 1 day         Urethral Catheter 09/04/20 1300 Non-latex;Straight-tip 18 Fr. 1 day          Airway                 Airway - Non-Surgical 09/04/20 1122 Endotracheal Tube 1 day          Arterial Line            Arterial Line 09/04/20 1819 Left Femoral 1 day          Peripheral Intravenous Line                 Peripheral IV - Single Lumen Right Forearm -- days                Significant Labs:    CBC/Anemia Profile:  Recent Labs   Lab 09/05/20  2316 09/06/20  0010 09/06/20  0507   WBC 5.69 4.96 6.29  6.29   HGB 5.6* 5.6* 9.6*   9.6*   HCT 17.4* 17.4* 28.8*  28.8*   PLT 73* 71* 91*  91*   MCV 87 87 89  89   RDW 15.2* 15.2* 14.7*  14.7*        Chemistries:  Recent Labs   Lab 09/04/20  1335 09/05/20  0358  09/05/20  1745 09/05/20  2338 09/06/20  0507    138  --  138  --  137   K 4.2 3.5  --  4.4  --  4.3   * 115*  --  108  --  108   CO2 16* 15*  --  18*  --  17*   BUN 28* 26*  --  31*  --  34*   CREATININE 1.8* 1.6*  --  2.2*  --  2.1*   CALCIUM 7.9* 6.7*  --  7.8*  --  7.9*   ALBUMIN 1.3* 1.0*   < > 2.9*  2.9* 3.3* 3.3*  3.3*   PROT 3.4* 2.9*   < > 4.5* 4.8* 5.0*  5.0*   BILITOT 0.5 0.7   < > 1.1* 1.4* 3.1*  3.1*   ALKPHOS 121 96   < > 80 74 88  88   ALT 59* 77*   < > 107* 117* 118*  118*   * 118*   < > 204* 265* 293*  293*   MG 1.9 1.2*  --   --   --  2.5   PHOS 2.9 3.4  --  3.5  --  4.5    < > = values in this interval not displayed.       All pertinent labs within the past 24 hours have been reviewed.    Significant Imaging:  I have reviewed all pertinent imaging results/findings within the past 24 hours.    Assessment/Plan:     * Perforated abdominal viscus  Neuro:  -sedation: propofol gtt  -analgesia: fentanyl gtt      CV:  - Vascular surgery consulted for R radial artery occlusion; appreciate recs.   - requiring decreasing pressor support. Vaso drip continues at 0.02 on and off, wean as tolerated  - titrate to MAP>60  - Heparin gtt started last night and discontinued after drop in Hgb.      Pulm:  - intubated, mechanically ventilated.    - Daily CXR  - SBT as tolerated     FEN/GI: Perforated stomach; s/p washout and patch (9/4)   - s/p washout and GI perf repair 9/4.    - plan for return to OR 9/6  - Fluid bolus upon arrival  - Cont Zosyn  - Pantoprazole 40mg IV daily  - Daily metabolic panel with PRN repletion of electrolytes     Renal: Pt with JACQUE upon arrival; BUN/Cr: 32/2.1 (9/4)  - JACQUE improving with increased UOP to 15cc/hr; BUN/Cr 34/2.1  - Continue to monitor with daily metabolic labs  - Lasix  challenge     HEME/ID: Pt septic and given several L of fluid upon arrival to SICU  - trend lactate daily  - cont zosyn  - 9/5 Hgb drop from 9.0 -> 6.8 -> 5.6 --- 2 U pRBC given and responded to 9.6  - Hb - 9.6  - daily CBC  - s/p Liver transplant in 2002; holding immunosuppressants for now per hepatology team.  Daily tacrolimus level with AM labs  - Consult ID     Endo:  - Hx of hypothyroidism; not taking synthroid at home    Dispo:  - Continue SICU care         Ron Grady MD  Critical Care - Surgery  [facility]

## 2020-09-07 LAB
ALBUMIN SERPL BCP-MCNC: 2.7 G/DL (ref 3.5–5.2)
ALBUMIN SERPL BCP-MCNC: 2.8 G/DL (ref 3.5–5.2)
ALBUMIN SERPL BCP-MCNC: 2.9 G/DL (ref 3.5–5.2)
ALBUMIN SERPL BCP-MCNC: 2.9 G/DL (ref 3.5–5.2)
ALBUMIN SERPL BCP-MCNC: 3 G/DL (ref 3.5–5.2)
ALLENS TEST: ABNORMAL
ALP SERPL-CCNC: 130 U/L (ref 55–135)
ALP SERPL-CCNC: 154 U/L (ref 55–135)
ALP SERPL-CCNC: 154 U/L (ref 55–135)
ALP SERPL-CCNC: 190 U/L (ref 55–135)
ALP SERPL-CCNC: 201 U/L (ref 55–135)
ALT SERPL W/O P-5'-P-CCNC: 104 U/L (ref 10–44)
ALT SERPL W/O P-5'-P-CCNC: 107 U/L (ref 10–44)
ALT SERPL W/O P-5'-P-CCNC: 107 U/L (ref 10–44)
ALT SERPL W/O P-5'-P-CCNC: 118 U/L (ref 10–44)
ALT SERPL W/O P-5'-P-CCNC: 131 U/L (ref 10–44)
ANION GAP SERPL CALC-SCNC: 14 MMOL/L (ref 8–16)
ANION GAP SERPL CALC-SCNC: 14 MMOL/L (ref 8–16)
ANISOCYTOSIS BLD QL SMEAR: SLIGHT
APTT BLDCRRT: 46.4 SEC (ref 21–32)
APTT BLDCRRT: 53.5 SEC (ref 21–32)
APTT BLDCRRT: 71.2 SEC (ref 21–32)
AST SERPL-CCNC: 285 U/L (ref 10–40)
AST SERPL-CCNC: 305 U/L (ref 10–40)
AST SERPL-CCNC: 305 U/L (ref 10–40)
AST SERPL-CCNC: 368 U/L (ref 10–40)
AST SERPL-CCNC: 479 U/L (ref 10–40)
BASOPHILS # BLD AUTO: ABNORMAL K/UL (ref 0–0.2)
BASOPHILS NFR BLD: 0 % (ref 0–1.9)
BILIRUB DIRECT SERPL-MCNC: 2 MG/DL (ref 0.1–0.3)
BILIRUB DIRECT SERPL-MCNC: 2.1 MG/DL (ref 0.1–0.3)
BILIRUB DIRECT SERPL-MCNC: 2.3 MG/DL (ref 0.1–0.3)
BILIRUB DIRECT SERPL-MCNC: 2.6 MG/DL (ref 0.1–0.3)
BILIRUB SERPL-MCNC: 2.3 MG/DL (ref 0.1–1)
BILIRUB SERPL-MCNC: 2.4 MG/DL (ref 0.1–1)
BILIRUB SERPL-MCNC: 2.7 MG/DL (ref 0.1–1)
BILIRUB SERPL-MCNC: 2.7 MG/DL (ref 0.1–1)
BILIRUB SERPL-MCNC: 3 MG/DL (ref 0.1–1)
BUN SERPL-MCNC: 44 MG/DL (ref 6–20)
BUN SERPL-MCNC: 50 MG/DL (ref 6–20)
CALCIUM SERPL-MCNC: 7.6 MG/DL (ref 8.7–10.5)
CALCIUM SERPL-MCNC: 7.7 MG/DL (ref 8.7–10.5)
CHLORIDE SERPL-SCNC: 106 MMOL/L (ref 95–110)
CHLORIDE SERPL-SCNC: 107 MMOL/L (ref 95–110)
CO2 SERPL-SCNC: 17 MMOL/L (ref 23–29)
CO2 SERPL-SCNC: 18 MMOL/L (ref 23–29)
CREAT SERPL-MCNC: 2.7 MG/DL (ref 0.5–1.4)
CREAT SERPL-MCNC: 2.9 MG/DL (ref 0.5–1.4)
DELSYS: ABNORMAL
DIFFERENTIAL METHOD: ABNORMAL
EOSINOPHIL # BLD AUTO: ABNORMAL K/UL (ref 0–0.5)
EOSINOPHIL NFR BLD: 0 % (ref 0–8)
ERYTHROCYTE [DISTWIDTH] IN BLOOD BY AUTOMATED COUNT: 15.3 % (ref 11.5–14.5)
ERYTHROCYTE [SEDIMENTATION RATE] IN BLOOD BY WESTERGREN METHOD: 14 MM/H
ERYTHROCYTE [SEDIMENTATION RATE] IN BLOOD BY WESTERGREN METHOD: 16 MM/H
ERYTHROCYTE [SEDIMENTATION RATE] IN BLOOD BY WESTERGREN METHOD: 16 MM/H
EST. GFR  (AFRICAN AMERICAN): 20.7 ML/MIN/1.73 M^2
EST. GFR  (AFRICAN AMERICAN): 22.5 ML/MIN/1.73 M^2
EST. GFR  (NON AFRICAN AMERICAN): 17.9 ML/MIN/1.73 M^2
EST. GFR  (NON AFRICAN AMERICAN): 19.5 ML/MIN/1.73 M^2
FIO2: 40
GLUCOSE SERPL-MCNC: 111 MG/DL (ref 70–110)
GLUCOSE SERPL-MCNC: 123 MG/DL (ref 70–110)
HCO3 UR-SCNC: 16.8 MMOL/L (ref 24–28)
HCO3 UR-SCNC: 17 MMOL/L (ref 24–28)
HCO3 UR-SCNC: 17.1 MMOL/L (ref 24–28)
HCT VFR BLD AUTO: 29.4 % (ref 37–48.5)
HGB BLD-MCNC: 9.7 G/DL (ref 12–16)
HYPOCHROMIA BLD QL SMEAR: ABNORMAL
IMM GRANULOCYTES # BLD AUTO: ABNORMAL K/UL (ref 0–0.04)
IMM GRANULOCYTES NFR BLD AUTO: ABNORMAL % (ref 0–0.5)
INR PPP: 1.1 (ref 0.8–1.2)
LYMPHOCYTES # BLD AUTO: ABNORMAL K/UL (ref 1–4.8)
LYMPHOCYTES NFR BLD: 2 % (ref 18–48)
MAGNESIUM SERPL-MCNC: 2.4 MG/DL (ref 1.6–2.6)
MCH RBC QN AUTO: 29.2 PG (ref 27–31)
MCHC RBC AUTO-ENTMCNC: 33 G/DL (ref 32–36)
MCV RBC AUTO: 89 FL (ref 82–98)
MIN VOL: 5
MIN VOL: 5
MODE: ABNORMAL
MONOCYTES # BLD AUTO: ABNORMAL K/UL (ref 0.3–1)
MONOCYTES NFR BLD: 5 % (ref 4–15)
MYELOCYTES NFR BLD MANUAL: 2 %
NEUTROPHILS NFR BLD: 91 % (ref 38–73)
NRBC BLD-RTO: 1 /100 WBC
PCO2 BLDA: 28.2 MMHG (ref 35–45)
PCO2 BLDA: 29.9 MMHG (ref 35–45)
PCO2 BLDA: 30.3 MMHG (ref 35–45)
PEEP: 5
PH SMN: 7.35 [PH] (ref 7.35–7.45)
PH SMN: 7.37 [PH] (ref 7.35–7.45)
PH SMN: 7.39 [PH] (ref 7.35–7.45)
PHOSPHATE SERPL-MCNC: 5.5 MG/DL (ref 2.7–4.5)
PIP: 19
PIP: 21
PLATELET # BLD AUTO: 79 K/UL (ref 150–350)
PLATELET BLD QL SMEAR: ABNORMAL
PMV BLD AUTO: 11.1 FL (ref 9.2–12.9)
PO2 BLDA: 73 MMHG (ref 80–100)
PO2 BLDA: 88 MMHG (ref 80–100)
PO2 BLDA: 99 MMHG (ref 80–100)
POC BE: -8 MMOL/L
POC BE: -8 MMOL/L
POC BE: -9 MMOL/L
POC SATURATED O2: 94 % (ref 95–100)
POC SATURATED O2: 97 % (ref 95–100)
POC SATURATED O2: 98 % (ref 95–100)
POC TCO2: 18 MMOL/L (ref 23–27)
POCT GLUCOSE: 100 MG/DL (ref 70–110)
POCT GLUCOSE: 106 MG/DL (ref 70–110)
POCT GLUCOSE: 107 MG/DL (ref 70–110)
POCT GLUCOSE: 109 MG/DL (ref 70–110)
POCT GLUCOSE: 111 MG/DL (ref 70–110)
POCT GLUCOSE: 115 MG/DL (ref 70–110)
POTASSIUM SERPL-SCNC: 3.2 MMOL/L (ref 3.5–5.1)
POTASSIUM SERPL-SCNC: 3.8 MMOL/L (ref 3.5–5.1)
PROT SERPL-MCNC: 4.9 G/DL (ref 6–8.4)
PROT SERPL-MCNC: 5 G/DL (ref 6–8.4)
PROTHROMBIN TIME: 11.7 SEC (ref 9–12.5)
RBC # BLD AUTO: 3.32 M/UL (ref 4–5.4)
SAMPLE: ABNORMAL
SITE: ABNORMAL
SODIUM SERPL-SCNC: 138 MMOL/L (ref 136–145)
SODIUM SERPL-SCNC: 138 MMOL/L (ref 136–145)
SP02: 100
SP02: 100
TACROLIMUS BLD-MCNC: 1.9 NG/ML (ref 5–15)
VT: 330
WBC # BLD AUTO: 7.42 K/UL (ref 3.9–12.7)

## 2020-09-07 PROCEDURE — 85027 COMPLETE CBC AUTOMATED: CPT

## 2020-09-07 PROCEDURE — 27200966 HC CLOSED SUCTION SYSTEM

## 2020-09-07 PROCEDURE — 63600175 PHARM REV CODE 636 W HCPCS: Performed by: STUDENT IN AN ORGANIZED HEALTH CARE EDUCATION/TRAINING PROGRAM

## 2020-09-07 PROCEDURE — 63600175 PHARM REV CODE 636 W HCPCS: Performed by: INTERNAL MEDICINE

## 2020-09-07 PROCEDURE — 80053 COMPREHEN METABOLIC PANEL: CPT

## 2020-09-07 PROCEDURE — 25000003 PHARM REV CODE 250: Performed by: STUDENT IN AN ORGANIZED HEALTH CARE EDUCATION/TRAINING PROGRAM

## 2020-09-07 PROCEDURE — 94003 VENT MGMT INPAT SUBQ DAY: CPT

## 2020-09-07 PROCEDURE — 27000221 HC OXYGEN, UP TO 24 HOURS

## 2020-09-07 PROCEDURE — 85007 BL SMEAR W/DIFF WBC COUNT: CPT

## 2020-09-07 PROCEDURE — 82803 BLOOD GASES ANY COMBINATION: CPT

## 2020-09-07 PROCEDURE — 99232 SBSQ HOSP IP/OBS MODERATE 35: CPT | Mod: ,,, | Performed by: INTERNAL MEDICINE

## 2020-09-07 PROCEDURE — 80076 HEPATIC FUNCTION PANEL: CPT

## 2020-09-07 PROCEDURE — 85610 PROTHROMBIN TIME: CPT

## 2020-09-07 PROCEDURE — 99232 PR SUBSEQUENT HOSPITAL CARE,LEVL II: ICD-10-PCS | Mod: ,,, | Performed by: INTERNAL MEDICINE

## 2020-09-07 PROCEDURE — 99900035 HC TECH TIME PER 15 MIN (STAT)

## 2020-09-07 PROCEDURE — C9113 INJ PANTOPRAZOLE SODIUM, VIA: HCPCS | Performed by: STUDENT IN AN ORGANIZED HEALTH CARE EDUCATION/TRAINING PROGRAM

## 2020-09-07 PROCEDURE — 85730 THROMBOPLASTIN TIME PARTIAL: CPT

## 2020-09-07 PROCEDURE — 63600175 PHARM REV CODE 636 W HCPCS: Performed by: SURGERY

## 2020-09-07 PROCEDURE — 85730 THROMBOPLASTIN TIME PARTIAL: CPT | Mod: 91

## 2020-09-07 PROCEDURE — 25000003 PHARM REV CODE 250: Performed by: INTERNAL MEDICINE

## 2020-09-07 PROCEDURE — 84100 ASSAY OF PHOSPHORUS: CPT

## 2020-09-07 PROCEDURE — 37799 UNLISTED PX VASCULAR SURGERY: CPT

## 2020-09-07 PROCEDURE — 99900026 HC AIRWAY MAINTENANCE (STAT)

## 2020-09-07 PROCEDURE — 80048 BASIC METABOLIC PNL TOTAL CA: CPT

## 2020-09-07 PROCEDURE — 83735 ASSAY OF MAGNESIUM: CPT

## 2020-09-07 PROCEDURE — 80197 ASSAY OF TACROLIMUS: CPT

## 2020-09-07 PROCEDURE — 94761 N-INVAS EAR/PLS OXIMETRY MLT: CPT

## 2020-09-07 PROCEDURE — 99291 PR CRITICAL CARE, E/M 30-74 MINUTES: ICD-10-PCS | Mod: 24,,, | Performed by: SURGERY

## 2020-09-07 PROCEDURE — 99291 CRITICAL CARE FIRST HOUR: CPT | Mod: 24,,, | Performed by: SURGERY

## 2020-09-07 PROCEDURE — 25000003 PHARM REV CODE 250: Performed by: SURGERY

## 2020-09-07 PROCEDURE — 80076 HEPATIC FUNCTION PANEL: CPT | Mod: 91

## 2020-09-07 PROCEDURE — 20000000 HC ICU ROOM

## 2020-09-07 RX ORDER — NOREPINEPHRINE BITARTRATE/D5W 4MG/250ML
0.02 PLASTIC BAG, INJECTION (ML) INTRAVENOUS CONTINUOUS
Status: DISCONTINUED | OUTPATIENT
Start: 2020-09-07 | End: 2020-09-08

## 2020-09-07 RX ORDER — TACROLIMUS 0.5 MG/1
0.5 CAPSULE ORAL 2 TIMES DAILY
Status: DISCONTINUED | OUTPATIENT
Start: 2020-09-07 | End: 2020-09-09

## 2020-09-07 RX ORDER — DEXMEDETOMIDINE HYDROCHLORIDE 4 UG/ML
0.2 INJECTION, SOLUTION INTRAVENOUS CONTINUOUS
Status: DISCONTINUED | OUTPATIENT
Start: 2020-09-07 | End: 2020-09-08

## 2020-09-07 RX ORDER — HYDROMORPHONE HYDROCHLORIDE 1 MG/ML
0.5 INJECTION, SOLUTION INTRAMUSCULAR; INTRAVENOUS; SUBCUTANEOUS EVERY 4 HOURS PRN
Status: DISCONTINUED | OUTPATIENT
Start: 2020-09-07 | End: 2020-09-08

## 2020-09-07 RX ADMIN — DEXMEDETOMIDINE HYDROCHLORIDE 0.8 MCG/KG/HR: 4 INJECTION, SOLUTION INTRAVENOUS at 05:09

## 2020-09-07 RX ADMIN — PIPERACILLIN SODIUM AND TAZOBACTAM SODIUM 4.5 G: 4; .5 INJECTION, POWDER, LYOPHILIZED, FOR SOLUTION INTRAVENOUS at 12:09

## 2020-09-07 RX ADMIN — HYDROMORPHONE HYDROCHLORIDE 0.5 MG: 1 INJECTION, SOLUTION INTRAMUSCULAR; INTRAVENOUS; SUBCUTANEOUS at 02:09

## 2020-09-07 RX ADMIN — NITROGLYCERIN 0.5 INCH: 20 OINTMENT TOPICAL at 05:09

## 2020-09-07 RX ADMIN — DEXMEDETOMIDINE HYDROCHLORIDE 0.8 MCG/KG/HR: 4 INJECTION, SOLUTION INTRAVENOUS at 01:09

## 2020-09-07 RX ADMIN — PROPOFOL 20 MCG/KG/MIN: 10 INJECTION, EMULSION INTRAVENOUS at 01:09

## 2020-09-07 RX ADMIN — POTASSIUM CHLORIDE 60 MEQ: 14.9 INJECTION, SOLUTION INTRAVENOUS at 08:09

## 2020-09-07 RX ADMIN — HYDROCORTISONE SODIUM SUCCINATE 100 MG: 100 INJECTION, POWDER, FOR SOLUTION INTRAMUSCULAR; INTRAVENOUS at 01:09

## 2020-09-07 RX ADMIN — NITROGLYCERIN 0.5 INCH: 20 OINTMENT TOPICAL at 12:09

## 2020-09-07 RX ADMIN — VASOPRESSIN 0.04 UNITS/MIN: 20 INJECTION INTRAVENOUS at 08:09

## 2020-09-07 RX ADMIN — TACROLIMUS 0.5 MG: 0.5 CAPSULE ORAL at 12:09

## 2020-09-07 RX ADMIN — PIPERACILLIN SODIUM AND TAZOBACTAM SODIUM 4.5 G: 4; .5 INJECTION, POWDER, LYOPHILIZED, FOR SOLUTION INTRAVENOUS at 08:09

## 2020-09-07 RX ADMIN — HYDROCORTISONE SODIUM SUCCINATE 100 MG: 100 INJECTION, POWDER, FOR SOLUTION INTRAMUSCULAR; INTRAVENOUS at 09:09

## 2020-09-07 RX ADMIN — NITROGLYCERIN 0.5 INCH: 20 OINTMENT TOPICAL at 06:09

## 2020-09-07 RX ADMIN — DEXMEDETOMIDINE HYDROCHLORIDE 0.2 MCG/KG/HR: 4 INJECTION, SOLUTION INTRAVENOUS at 01:09

## 2020-09-07 RX ADMIN — PIPERACILLIN SODIUM AND TAZOBACTAM SODIUM 4.5 G: 4; .5 INJECTION, POWDER, LYOPHILIZED, FOR SOLUTION INTRAVENOUS at 05:09

## 2020-09-07 RX ADMIN — PANTOPRAZOLE SODIUM 40 MG: 40 INJECTION, POWDER, LYOPHILIZED, FOR SOLUTION INTRAVENOUS at 08:09

## 2020-09-07 RX ADMIN — DEXMEDETOMIDINE HYDROCHLORIDE 1.2 MCG/KG/HR: 4 INJECTION, SOLUTION INTRAVENOUS at 02:09

## 2020-09-07 RX ADMIN — HYDROCORTISONE SODIUM SUCCINATE 100 MG: 100 INJECTION, POWDER, FOR SOLUTION INTRAMUSCULAR; INTRAVENOUS at 06:09

## 2020-09-07 NOTE — PROGRESS NOTES
[facility]  General Surgery  Progress Note    Subjective:     History of Present Illness:  Ms. Hernandez is a 49yo F w/PMH of von Gierke disease s/p liver transplant (2002, on tacro + MMF, follows Dr. Nielsen), hx chronic rejection (bx 2015 with acute and chronic rejection s/p steroids), biliary stricture and ductopenic rejection, recently with cirrhosis on fibroscan (10/2019), HTN, and depression who presents as a transfer for further evaluation of gastric outlet obstruction and esophageal stricturing.  Patient decompensated requiring multiple pressors and intubation. CT scan showed free air. Prior to intubation patient reported severe abdominal pain.   states that patient has had epigastric pain, nausea, and vomiting for several days.     Post-Op Info:  Procedure(s) (LRB):  LAPAROTOMY, EXPLORATORY with abd closure (N/A)   1 Day Post-Op     Interval History: Abdomen closed in OR yesterday. Hb stable at 9.7 from 10 last nigh. Intubated on minimal vent settings (40/5). On 0.04 vasopressin.  125cc bilious output from G. Remains on low intensity nomogram hep gtt for right radial clot - hand warm and well-perfused. Making adequate UOP.    Medications:  Continuous Infusions:   fentanyl 75 mcg/hr (09/07/20 0700)    heparin (porcine) in D5W 10 Units/kg/hr (09/07/20 0545)    norepinephrine bitartrate-D5W Stopped (09/06/20 2350)    propofoL 15 mcg/kg/min (09/07/20 0700)    vasopressin (PITRESSIN) infusion 0.04 Units/min (09/07/20 0700)     Scheduled Meds:   heparin (PORCINE)  60 Units/kg (Order-Specific) Intravenous Once    hydrocortisone sodium succinate  100 mg Intravenous Q8H    nitroGLYCERIN 2% TD oint  0.5 inch Topical (Top) Q6H    pantoprazole  40 mg Intravenous Daily    piperacillin-tazobactam (ZOSYN) IVPB  4.5 g Intravenous Q8H     PRN Meds:calcium gluconate IVPB, calcium gluconate IVPB, calcium gluconate IVPB, dextrose 50%, dextrose 50%, fentaNYL, glucagon (human recombinant),  glucose, glucose, heparin (PORCINE), heparin (PORCINE), magnesium sulfate IVPB, magnesium sulfate IVPB, melatonin, ondansetron, oxyCODONE, potassium chloride in water **AND** potassium chloride in water **AND** potassium chloride in water, promethazine, sodium chloride 0.9%, sodium chloride 0.9%, sodium phosphate IVPB, sodium phosphate IVPB, sodium phosphate IVPB     Review of patient's allergies indicates:   Allergen Reactions    Codeine Itching     Other reaction(s): Itching    Lipitor [atorvastatin] Other (See Comments)     Other reaction(s): Muscle pain  Muscle cranmps    Morphine Itching     Other reaction(s): nausea and vomiting     Zoloft [sertraline] Other (See Comments)     Tremors/muscle spasms     Objective:     Vital Signs (Most Recent):  Temp: 99 °F (37.2 °C) (09/07/20 0722)  Pulse: (!) 57 (09/07/20 0722)  Resp: 14 (09/07/20 0722)  BP: (!) 86/51 (09/07/20 0700)  SpO2: (!) 90 % (09/07/20 0722) Vital Signs (24h Range):  Temp:  [97.5 °F (36.4 °C)-99.3 °F (37.4 °C)] 99 °F (37.2 °C)  Pulse:  [] 57  Resp:  [12-17] 14  SpO2:  [75 %-100 %] 90 %  BP: ()/(49-64) 86/51  Arterial Line BP: ()/(46-60) 97/51     Weight: 58 kg (127 lb 13.9 oz)  Body mass index is 25.83 kg/m².    Intake/Output - Last 3 Shifts       09/05 0700 - 09/06 0659 09/06 0700 - 09/07 0659 09/07 0700 - 09/08 0659    I.V. (mL/kg) 5845.8 (100.8) 2962.9 (51.1)     Blood 700      IV Piggyback       Total Intake(mL/kg) 6545.8 (112.9) 2962.9 (51.1)     Urine (mL/kg/hr) 427 (0.3) 596 (0.4) 10 (0.3)    Drains 614 149     Other 650 160     Total Output 1691 905 10    Net +4854.8 +2057.9 -10                 Physical Exam  Constitutional:       Comments: Intubated/Sedated   HENT:      Head: Normocephalic and atraumatic.   Cardiovascular:      Rate and Rhythm: Normal rate and regular rhythm.   Pulmonary:      Effort: Pulmonary effort is normal. No respiratory distress.      Comments: Vent Mode: A/C  Oxygen Concentration (%):  (40-50)  40  Resp Rate Total:  (16 br/min-24 br/min) 16 br/min  Vt Set:  (330 mL) 330 mL  PEEP/CPAP:  (5 cmH20) 5 cmH20  Mean Airway Pressure:  (8 cmH20-9 cmH20) 8 cmH20  Abdominal:      Palpations: Abdomen is soft.      Comments: Abdomen closed primarily, skin closed with staples. Surgical dressings c/d/i.   Musculoskeletal:      Comments: Right hand warm and well perfused   Skin:     General: Skin is warm and dry.      Comments: Mottling improving   Neurological:      Comments: Sedated         Significant Labs:  CBC:   Recent Labs   Lab 09/07/20  0432   WBC 7.42   RBC 3.32*   HGB 9.7*   HCT 29.4*   PLT 79*   MCV 89   MCH 29.2   MCHC 33.0     CMP:   Recent Labs   Lab 09/07/20 0432   *   CALCIUM 7.7*   ALBUMIN 2.9*  2.9*   PROT 5.0*  5.0*      K 3.8   CO2 17*      BUN 44*   CREATININE 2.7*   ALKPHOS 154*  154*   *  107*   *  305*   BILITOT 2.7*  2.7*       Significant Diagnostics:  I have reviewed all pertinent imaging results/findings within the past 24 hours.    Assessment/Plan:     * Perforated abdominal viscus  50 y.o. female w/ free air on CT scan. S/p emergent ex lap on 9/4 w/ findings of gastric perforation, primary repair. Now 1 Day Post-Op from abdominal closure on 9/6, skin stapled closed, KERRY drains removed.     Continue SICU care  Wean to extubate today  Cont to wean pressors  Vascular surgery consulted for ischemia of the right hand. Improving. on heparin gtt  Would have low threshold to stop hep gtt if rebleeds, Hb stable for now    Remains critically ill in the SICU         Constance Martin MD  General Surgery  [facility]

## 2020-09-07 NOTE — SUBJECTIVE & OBJECTIVE
Interval History/Significant Events: Patient remains intubated, sedated. Pressor requirements increased from .02 to .04 overnight. Hgb stable at 9.6. Patient remains on heparin drip for R radial artery thrombosis. BUN and Cr continue to increase, 44 and 2.7 today. Increased aPTT 71.2. Plan to wean propofol, pressors as tolerated.       Follow-up For: Procedure(s) (LRB):  LAPAROTOMY, EXPLORATORY with abd closure (N/A)    Post-Operative Day: 1 Day Post-Op    Objective:     Vital Signs (Most Recent):  Temp: 97.9 °F (36.6 °C) (09/07/20 1000)  Pulse: (!) 56 (09/07/20 1000)  Resp: 14 (09/07/20 1000)  BP: 124/69 (09/07/20 1000)  SpO2: 96 % (09/07/20 1000) Vital Signs (24h Range):  Temp:  [97.5 °F (36.4 °C)-99.3 °F (37.4 °C)] 97.9 °F (36.6 °C)  Pulse:  [] 56  Resp:  [12-25] 14  SpO2:  [65 %-100 %] 96 %  BP: ()/(40-69) 124/69  Arterial Line BP: ()/(35-73) 106/52     Weight: 58 kg (127 lb 13.9 oz)  Body mass index is 25.83 kg/m².      Intake/Output Summary (Last 24 hours) at 9/7/2020 1028  Last data filed at 9/7/2020 0900  Gross per 24 hour   Intake 2421.86 ml   Output 738 ml   Net 1683.86 ml       Physical Exam  Vitals signs reviewed.   Constitutional:       Comments: Sedated   HENT:      Head: Normocephalic and atraumatic.      Mouth/Throat:      Comments: intubated  Neck:      Comments: RIJ central line  Cardiovascular:      Rate and Rhythm: Normal rate and regular rhythm.      Comments: Rate very sensitize to sedation; excellent dopplerable signals to the R ulnar and R palmar arch  Pulmonary:      Breath sounds: Normal breath sounds.      Comments: Intubated, mechanically ventilated  Abdominal:      Comments: Midline incision with dressing in place; dressing dry, intact with old blood stain; G tube in place   Skin:     Comments: Slightly pale, mottled appearance of distal R arm; continues to improve; R arm remains warm         Vents:  Vent Mode: A/C (09/07/20 0722)  Ventilator Initiated: Yes (09/04/20  My pulse ox said my heart rate was 185. Pt denies chest pain or palpitation 1130)  Set Rate: 14 BPM (09/07/20 0722)  Vt Set: 330 mL (09/07/20 0722)  PEEP/CPAP: 5 cmH20 (09/07/20 0722)  Oxygen Concentration (%): 40 (09/07/20 1000)  Peak Airway Pressure: 17 cmH2O (09/07/20 0722)  Plateau Pressure: 17 cmH20 (09/07/20 0722)  Total Ve: 7.39 mL (09/07/20 0722)  F/VT Ratio<105 (RSBI): (!) 36.94 (09/07/20 0722)    Lines/Drains/Airways     Central Venous Catheter Line            Percutaneous Central Line Insertion/Assessment - Triple Lumen  09/04/20 0800 right internal jugular 3 days          Drain                 Gastrostomy/Enterostomy 09/04/20 LUQ decompression 3 days         Urethral Catheter 09/04/20 1300 Non-latex;Straight-tip 18 Fr. 2 days          Airway                 Airway - Non-Surgical 09/04/20 1122 Endotracheal Tube 2 days          Arterial Line            Arterial Line 09/04/20 1819 Left Femoral 2 days          Peripheral Intravenous Line                 Peripheral IV - Single Lumen Right Forearm -- days                Significant Labs:    CBC/Anemia Profile:  Recent Labs   Lab 09/06/20  1214 09/06/20 2050 09/07/20  0432   WBC 6.17 9.54 7.42   HGB 9.7* 10.0* 9.7*   HCT 28.2* 29.4* 29.4*   PLT 84* 87* 79*   MCV 86 88 89   RDW 15.0* 15.3* 15.3*        Chemistries:  Recent Labs   Lab 09/05/20  1745  09/06/20  0507 09/06/20  1214  09/06/20 2050 09/07/20  0042 09/07/20  0432     --  137 137  --  138  --  138   K 4.4  --  4.3 4.3  --  3.9  --  3.8     --  108 108  --  107  --  107   CO2 18*  --  17* 16*  --  16*  --  17*   BUN 31*  --  34* 37*  --  40*  --  44*   CREATININE 2.2*  --  2.1* 2.3*  --  2.6*  --  2.7*   CALCIUM 7.8*  --  7.9* 8.0*  --  7.9*  --  7.7*   ALBUMIN 2.9*  2.9*   < > 3.3*  3.3* 3.4*  3.4*   < > 3.2* 3.0* 2.9*  2.9*   PROT 4.5*   < > 5.0*  5.0* 5.2*  5.2*   < > 5.3* 5.0* 5.0*  5.0*   BILITOT 1.1*   < > 3.1*  3.1* 4.4*  4.4*   < > 3.6* 3.0* 2.7*  2.7*   ALKPHOS 80   < > 88  88 89  89   < > 111 130 154*  154*   *   < > 118*  118* 108*   108*   < > 109* 104* 107*  107*   *   < > 293*  293* 287*  287*   < > 281* 285* 305*  305*   MG  --   --  2.5  --   --   --   --  2.4   PHOS 3.5  --  4.5  --   --   --   --  5.5*    < > = values in this interval not displayed.       ABGs:   Recent Labs   Lab 09/07/20  0731   PH 7.353   PCO2 30.3*   HCO3 16.8*   POCSATURATED 94*   BE -9     Coagulation:   Recent Labs   Lab 09/07/20  0432   INR 1.1   APTT 71.2*       Significant Imaging:  I have reviewed all pertinent imaging results/findings within the past 24 hours.

## 2020-09-07 NOTE — SUBJECTIVE & OBJECTIVE
Interval History: Abdomen closed in OR yesterday. Hb stable at 9.7 from 10 last nigh. Intubated on minimal vent settings (40/5). On 0.04 vasopressin.  125cc bilious output from G. Remains on low intensity nomogram hep gtt for right radial clot - hand warm and well-perfused. Making adequate UOP.    Medications:  Continuous Infusions:   fentanyl 75 mcg/hr (09/07/20 0700)    heparin (porcine) in D5W 10 Units/kg/hr (09/07/20 0545)    norepinephrine bitartrate-D5W Stopped (09/06/20 2350)    propofoL 15 mcg/kg/min (09/07/20 0700)    vasopressin (PITRESSIN) infusion 0.04 Units/min (09/07/20 0700)     Scheduled Meds:   heparin (PORCINE)  60 Units/kg (Order-Specific) Intravenous Once    hydrocortisone sodium succinate  100 mg Intravenous Q8H    nitroGLYCERIN 2% TD oint  0.5 inch Topical (Top) Q6H    pantoprazole  40 mg Intravenous Daily    piperacillin-tazobactam (ZOSYN) IVPB  4.5 g Intravenous Q8H     PRN Meds:calcium gluconate IVPB, calcium gluconate IVPB, calcium gluconate IVPB, dextrose 50%, dextrose 50%, fentaNYL, glucagon (human recombinant), glucose, glucose, heparin (PORCINE), heparin (PORCINE), magnesium sulfate IVPB, magnesium sulfate IVPB, melatonin, ondansetron, oxyCODONE, potassium chloride in water **AND** potassium chloride in water **AND** potassium chloride in water, promethazine, sodium chloride 0.9%, sodium chloride 0.9%, sodium phosphate IVPB, sodium phosphate IVPB, sodium phosphate IVPB     Review of patient's allergies indicates:   Allergen Reactions    Codeine Itching     Other reaction(s): Itching    Lipitor [atorvastatin] Other (See Comments)     Other reaction(s): Muscle pain  Muscle cranmps    Morphine Itching     Other reaction(s): nausea and vomiting     Zoloft [sertraline] Other (See Comments)     Tremors/muscle spasms     Objective:     Vital Signs (Most Recent):  Temp: 99 °F (37.2 °C) (09/07/20 0722)  Pulse: (!) 57 (09/07/20 0722)  Resp: 14 (09/07/20 0722)  BP: (!) 86/51  (09/07/20 0700)  SpO2: (!) 90 % (09/07/20 0722) Vital Signs (24h Range):  Temp:  [97.5 °F (36.4 °C)-99.3 °F (37.4 °C)] 99 °F (37.2 °C)  Pulse:  [] 57  Resp:  [12-17] 14  SpO2:  [75 %-100 %] 90 %  BP: ()/(49-64) 86/51  Arterial Line BP: ()/(46-60) 97/51     Weight: 58 kg (127 lb 13.9 oz)  Body mass index is 25.83 kg/m².    Intake/Output - Last 3 Shifts       09/05 0700 - 09/06 0659 09/06 0700 - 09/07 0659 09/07 0700 - 09/08 0659    I.V. (mL/kg) 5845.8 (100.8) 2962.9 (51.1)     Blood 700      IV Piggyback       Total Intake(mL/kg) 6545.8 (112.9) 2962.9 (51.1)     Urine (mL/kg/hr) 427 (0.3) 596 (0.4) 10 (0.3)    Drains 614 149     Other 650 160     Total Output 1691 905 10    Net +4854.8 +2057.9 -10                 Physical Exam  Constitutional:       Comments: Intubated/Sedated   HENT:      Head: Normocephalic and atraumatic.   Cardiovascular:      Rate and Rhythm: Normal rate and regular rhythm.   Pulmonary:      Effort: Pulmonary effort is normal. No respiratory distress.      Comments: Vent Mode: A/C  Oxygen Concentration (%):  (40-50) 40  Resp Rate Total:  (16 br/min-24 br/min) 16 br/min  Vt Set:  (330 mL) 330 mL  PEEP/CPAP:  (5 cmH20) 5 cmH20  Mean Airway Pressure:  (8 cmH20-9 cmH20) 8 cmH20  Abdominal:      Palpations: Abdomen is soft.      Comments: Abdomen closed primarily, skin closed with staples. Surgical dressings c/d/i.   Musculoskeletal:      Comments: Right hand warm and well perfused   Skin:     General: Skin is warm and dry.      Comments: Mottling improving   Neurological:      Comments: Sedated         Significant Labs:  CBC:   Recent Labs   Lab 09/07/20 0432   WBC 7.42   RBC 3.32*   HGB 9.7*   HCT 29.4*   PLT 79*   MCV 89   MCH 29.2   MCHC 33.0     CMP:   Recent Labs   Lab 09/07/20 0432   *   CALCIUM 7.7*   ALBUMIN 2.9*  2.9*   PROT 5.0*  5.0*      K 3.8   CO2 17*      BUN 44*   CREATININE 2.7*   ALKPHOS 154*  154*   *  107*   *  305*    BILITOT 2.7*  2.7*       Significant Diagnostics:  I have reviewed all pertinent imaging results/findings within the past 24 hours.

## 2020-09-07 NOTE — ASSESSMENT & PLAN NOTE
50 y.o. female w/ free air on CT scan. S/p emergent ex lap on 9/4 w/ findings of gastric perforation, primary repair. Now 1 Day Post-Op from abdominal closure on 9/6, skin stapled closed, KERRY drains removed.     Continue SICU care  Wean to extubate today  Cont to wean pressors  Vascular surgery consulted for ischemia of the right hand. Improving. on heparin gtt  Would have low threshold to stop hep gtt if rebleeds, Hb stable for now    Remains critically ill in the SICU

## 2020-09-07 NOTE — ASSESSMENT & PLAN NOTE
JACQUE most likely secondary to hypoperfusion, likely ATN.  Pt also with non anion gap metabolic acidosis with respiratory compensation  Given Lasix 150mg IV x1 to poor UOP.    - No emergent need for dialysis today  - Agree with lasix challenge, may increase to 200mg IV x1  - Can give 500 ml of isotonic bicarb (75 Meq of sodium bicarb in 500 ml of sterile water).   - Strict I/O and chart   - Renally dose all medications   - Avoid nephrotoxic medications, NSAIDs, IV contrast, ACE/ARB.  - Maintain MAP > 65  - Hb > 7 gm/dL

## 2020-09-07 NOTE — ASSESSMENT & PLAN NOTE
Neuro:  -sedation: propofol gtt  -analgesia: fentanyl gtt      CV:  - Vascular surgery consulted for R radial artery occlusion; appreciate recs.   - requiring decreasing pressor support. Vaso drip increased from .02 to .04 overnight, wean as tolerated  - titrate to MAP>60  - Heparin gtt restarted last night at 10mg/kg/hr      Pulm:  - intubated, mechanically ventilated.    - Daily CXR  - SBT as tolerated     FEN/GI: Perforated stomach; s/p washout and patch (9/4); abdominal closure (9/6)  - s/p washout and closure on 9/6   - Cont Zosyn  - Pantoprazole 40mg IV daily  - Daily metabolic panel with PRN repletion of electrolytes     Renal: Pt with JACQUE upon arrival; BUN/Cr: 32/2.1 (9/4)  - patient with oliguria, anuria over last 24hrs  - JACQUE worsening; BUN/Cr 44/2.7  - Continue to monitor with daily metabolic labs  - lasix challenge yesterday with no improvement in UOP     HEME/ID: Pt septic and given several L of fluid upon arrival to SICU  - trend lactate daily  - cont zosyn  - 9/5 Hgb drop from 9.0 -> 6.8 -> 5.6 --- 2 U pRBC given and responded to 9.6  - Hb stable; 9.7 today  - daily CBC  - s/p Liver transplant in 2002; Daily tacrolimus level with AM labs. Per hepatology, restart tacrolimus 1mg BID  - Consult ID     Endo:  - Hx of hypothyroidism; not taking synthroid at home    Dispo:  - Continue SICU care  - wean proprofol as tolerated  - wean to extubate as tolerated

## 2020-09-07 NOTE — TREATMENT PLAN
Hepatology Treatment Plan    Elda Hernandez is a 50 y.o. female admitted to hospital 9/3/2020 (Hospital Day: 5) due to Perforated abdominal viscus. Hepatology following for immunosuppression management.    Lab Results   Component Value Date    TACROLIMUS 1.9 (L) 09/07/2020    TACROLIMUS 2.3 (L) 09/06/2020    TACROLIMUS 3.0 (L) 09/05/2020       Lab Results   Component Value Date    CREATININE 2.7 (H) 09/07/2020    CREATININE 2.6 (H) 09/06/2020    CREATININE 2.3 (H) 09/06/2020       Lab Results   Component Value Date     (H) 09/07/2020     (H) 09/07/2020     (H) 09/07/2020     (H) 09/07/2020     (H) 04/07/2018    ALKPHOS 154 (H) 09/07/2020    ALKPHOS 154 (H) 09/07/2020    BILITOT 2.7 (H) 09/07/2020    BILITOT 2.7 (H) 09/07/2020    WBC 7.42 09/07/2020    HGB 9.7 (L) 09/07/2020    PLT 79 (L) 09/07/2020       Kidney function is worsening  Liver enzymes are worsening    Plan  - Changes to immunosuppression regimen as below  - f/u Liver U/S    Immunosuppression Regimen:  Please restart Tacrolimus: 1mg qAM, 1mg qPM  CellCept: continue to hold    Please notify hepatology on day of discharge to discuss discharge medications and follow up.     Please obtain daily CBC, BMP, LFT, INR, immunosuppressant trough level    Thank you for involving us in the care of Elda Hernandez. Please call with any additional concerns or questions.    Js Matias MD  Gastroenterology Fellow

## 2020-09-07 NOTE — PLAN OF CARE
"   SICU PLAN OF CARE NOTE    Dx: Perforated abdominal viscus    Shift Events: 500cc of bicarb IVPB infusion was ordered in attempt to help correct bicarb, which was unsuccessful, her UOP was still inadequate as well. Team aware and they said they will most likely be lining pt in am for dialysis. We restarted Heparin gtt around 2300. Pt BP more stable and less labile tonight than last night. Vaso was at 0.04units/hr most of the night and levo off. No other acute events or changes on shift.     Goals of Care: comfort, hemodynamic stability, adequate oxygenation, improved renal function. Start to wean to extubate.    Neuro: Sedated, Arouses to Voice, and Moves All Extremities    Vital Signs: BP (!) 86/51 (BP Location: Left arm, Patient Position: Lying)   Pulse 69   Temp 98.4 °F (36.9 °C)   Resp 15   Ht 4' 11" (1.499 m)   Wt 58 kg (127 lb 13.9 oz)   SpO2 (!) 88%   BMI 25.83 kg/m²     Respiratory: Ventilator 50%/5/14/330    Diet: NPO    Gtts: Propfol, Fentanyl, Vasopressin, and Heparin    Urine Output: Urinary Catheter 200 cc/shift    Drains: G-tube/J-tube, total output 0 cc /  shift    Skin: see skin flowsheet for details. Pt turned and repositioned q2. Pt on waffle mattress, inflated. Foams to geoff heels and heel boats in place.        "

## 2020-09-07 NOTE — PROGRESS NOTES
[facility]  Critical Care - Surgery  Progress Note    Patient Name: Elda Hernandez  MRN: [id number]  Admission Date: 9/3/2020  Hospital Length of Stay: 4 days  Code Status: Full Code  Attending Provider: Clark Rodriguez MD  Primary Care Provider: Jordana Woods MD   Principal Problem: Perforated abdominal viscus    Subjective:     Hospital/ICU Course:  9/4: Pt admitted to SICU following ex lap for GI perf. Intubated, sedated. Wound vac in place.  Soon after arrival to unit a mottled appearance to RUE was noted.  Vascular consulted and US revealed R radial artery thrombosis.    9/5: Tachycardic..  R arm appearance greatly improved overnight.    9/6: Patient returned to OR for abdominal washout, closure. R arm with dopplerable signals to ulnar artery and palmar arch.     Interval History/Significant Events: Patient remains intubated, sedated. Pressor requirements increased from .02 to .04 overnight. Hgb stable at 9.6. Patient remains on heparin drip for R radial artery thrombosis. BUN and Cr continue to increase, 44 and 2.7 today. Increased aPTT 71.2. Plan to wean propofol, pressors as tolerated.       Follow-up For: Procedure(s) (LRB):  LAPAROTOMY, EXPLORATORY with abd closure (N/A)    Post-Operative Day: 1 Day Post-Op    Objective:     Vital Signs (Most Recent):  Temp: 97.9 °F (36.6 °C) (09/07/20 1000)  Pulse: (!) 56 (09/07/20 1000)  Resp: 14 (09/07/20 1000)  BP: 124/69 (09/07/20 1000)  SpO2: 96 % (09/07/20 1000) Vital Signs (24h Range):  Temp:  [97.5 °F (36.4 °C)-99.3 °F (37.4 °C)] 97.9 °F (36.6 °C)  Pulse:  [] 56  Resp:  [12-25] 14  SpO2:  [65 %-100 %] 96 %  BP: ()/(40-69) 124/69  Arterial Line BP: ()/(35-73) 106/52     Weight: 58 kg (127 lb 13.9 oz)  Body mass index is 25.83 kg/m².      Intake/Output Summary (Last 24 hours) at 9/7/2020 1028  Last data filed at 9/7/2020 0900  Gross per 24 hour   Intake 2421.86 ml   Output 738 ml   Net 1683.86 ml       Physical Exam  Vitals  signs reviewed.   Constitutional:       Comments: Sedated   HENT:      Head: Normocephalic and atraumatic.      Mouth/Throat:      Comments: intubated  Neck:      Comments: RIJ central line  Cardiovascular:      Rate and Rhythm: Normal rate and regular rhythm.      Comments: Rate very sensitize to sedation; excellent dopplerable signals to the R ulnar and R palmar arch  Pulmonary:      Breath sounds: Normal breath sounds.      Comments: Intubated, mechanically ventilated  Abdominal:      Comments: Midline incision with dressing in place; dressing dry, intact with old blood stain; G tube in place   Skin:     Comments: Slightly pale, mottled appearance of distal R arm; continues to improve; R arm remains warm         Vents:  Vent Mode: A/C (09/07/20 0722)  Ventilator Initiated: Yes (09/04/20 1130)  Set Rate: 14 BPM (09/07/20 0722)  Vt Set: 330 mL (09/07/20 0722)  PEEP/CPAP: 5 cmH20 (09/07/20 0722)  Oxygen Concentration (%): 40 (09/07/20 1000)  Peak Airway Pressure: 17 cmH2O (09/07/20 0722)  Plateau Pressure: 17 cmH20 (09/07/20 0722)  Total Ve: 7.39 mL (09/07/20 0722)  F/VT Ratio<105 (RSBI): (!) 36.94 (09/07/20 0722)    Lines/Drains/Airways     Central Venous Catheter Line            Percutaneous Central Line Insertion/Assessment - Triple Lumen  09/04/20 0800 right internal jugular 3 days          Drain                 Gastrostomy/Enterostomy 09/04/20 LUQ decompression 3 days         Urethral Catheter 09/04/20 1300 Non-latex;Straight-tip 18 Fr. 2 days          Airway                 Airway - Non-Surgical 09/04/20 1122 Endotracheal Tube 2 days          Arterial Line            Arterial Line 09/04/20 1819 Left Femoral 2 days          Peripheral Intravenous Line                 Peripheral IV - Single Lumen Right Forearm -- days                Significant Labs:    CBC/Anemia Profile:  Recent Labs   Lab 09/06/20  1214 09/06/20  2050 09/07/20  0432   WBC 6.17 9.54 7.42   HGB 9.7* 10.0* 9.7*   HCT 28.2* 29.4* 29.4*   PLT 84*  87* 79*   MCV 86 88 89   RDW 15.0* 15.3* 15.3*        Chemistries:  Recent Labs   Lab 09/05/20  1745  09/06/20  0507 09/06/20  1214  09/06/20 2050 09/07/20  0042 09/07/20  0432     --  137 137  --  138  --  138   K 4.4  --  4.3 4.3  --  3.9  --  3.8     --  108 108  --  107  --  107   CO2 18*  --  17* 16*  --  16*  --  17*   BUN 31*  --  34* 37*  --  40*  --  44*   CREATININE 2.2*  --  2.1* 2.3*  --  2.6*  --  2.7*   CALCIUM 7.8*  --  7.9* 8.0*  --  7.9*  --  7.7*   ALBUMIN 2.9*  2.9*   < > 3.3*  3.3* 3.4*  3.4*   < > 3.2* 3.0* 2.9*  2.9*   PROT 4.5*   < > 5.0*  5.0* 5.2*  5.2*   < > 5.3* 5.0* 5.0*  5.0*   BILITOT 1.1*   < > 3.1*  3.1* 4.4*  4.4*   < > 3.6* 3.0* 2.7*  2.7*   ALKPHOS 80   < > 88  88 89  89   < > 111 130 154*  154*   *   < > 118*  118* 108*  108*   < > 109* 104* 107*  107*   *   < > 293*  293* 287*  287*   < > 281* 285* 305*  305*   MG  --   --  2.5  --   --   --   --  2.4   PHOS 3.5  --  4.5  --   --   --   --  5.5*    < > = values in this interval not displayed.       ABGs:   Recent Labs   Lab 09/07/20 0731   PH 7.353   PCO2 30.3*   HCO3 16.8*   POCSATURATED 94*   BE -9     Coagulation:   Recent Labs   Lab 09/07/20 0432   INR 1.1   APTT 71.2*       Significant Imaging:  I have reviewed all pertinent imaging results/findings within the past 24 hours.    Assessment/Plan:     * Perforated abdominal viscus  Neuro:  -sedation: propofol gtt  -analgesia: fentanyl gtt      CV:  - Vascular surgery consulted for R radial artery occlusion; appreciate recs.   - requiring decreasing pressor support. Vaso drip increased from .02 to .04 overnight, wean as tolerated  - titrate to MAP>60  - Heparin gtt restarted last night at 10mg/kg/hr      Pulm:  - intubated, mechanically ventilated.    - Daily CXR  - SBT as tolerated     FEN/GI: Perforated stomach; s/p washout and patch (9/4); abdominal closure (9/6)  - s/p washout and closure on 9/6   - Cont Zosyn  - Pantoprazole  40mg IV daily  - Daily metabolic panel with PRN repletion of electrolytes     Renal: Pt with JACQUE upon arrival; BUN/Cr: 32/2.1 (9/4)  - patient with oliguria, anuria over last 24hrs  - JACQUE worsening; BUN/Cr 44/2.7  - Continue to monitor with daily metabolic labs  - lasix challenge yesterday with no improvement in UOP     HEME/ID: Pt septic and given several L of fluid upon arrival to SICU  - trend lactate daily  - cont zosyn  - 9/5 Hgb drop from 9.0 -> 6.8 -> 5.6 --- 2 U pRBC given and responded to 9.6  - Hb stable; 9.7 today  - daily CBC  - s/p Liver transplant in 2002; Daily tacrolimus level with AM labs. Per hepatology, restart tacrolimus 1mg BID  - Consult ID     Endo:  - Hx of hypothyroidism; not taking synthroid at home    Dispo:  - Continue SICU care  - wean proprofol as tolerated  - wean to extubate as tolerated       Critical care was time spent personally by me on the following activities: development of treatment plan with patient or surrogate and bedside caregivers, discussions with consultants, evaluation of patient's response to treatment, examination of patient, ordering and performing treatments and interventions, ordering and review of laboratory studies, ordering and review of radiographic studies, pulse oximetry, re-evaluation of patient's condition.  This critical care time did not overlap with that of any other provider or involve time for any procedures.     Jakob Gutierrez MD  Critical Care - Surgery  [facility]

## 2020-09-07 NOTE — PROGRESS NOTES
[facility]  Nephrology  Progress Note    Patient Name: Elda Hernandez  MRN: [id number]  Admission Date: 9/3/2020  Hospital Length of Stay: 4 days  Attending Provider: Clark Rodriguez MD   Primary Care Physician: Jordana Woods MD  Principal Problem:Perforated abdominal viscus    Subjective:     HPI: 50 y.o. female that has a past medical history of Angiolipoma of kidney (10/1/2018), Arnold-Chiari malformation, Depression, Esophageal stricture, Essential tremor, Hypertension, Liver fibrosis, Osteoporosis, Recurrent urinary tract infection, Seizures, SIADH (syndrome of inappropriate ADH production), Squamous cell carcinoma (10/2014), and Von Gierke disease s/p liver transplant (2002, on tacro + MMF), HTN, and depression that presented to OSH due to worsening diffuse abdominal for 3 weeks, associated with nausea, vomiting and decreased PO intake. Abdominal CT scan showed gastric outlet narrowing, obstructive-related changes at pyloric-duodenal junction, and thickening of GE junction. EGD on 9/3 found with grade IV esophagitis with stricture. Pt transferred for GI evaluation and found with hypotension requiring vasopressors and lactic Acid of 3.9. Repeat CT showed decompressed abdomen and evidence of free air. Exploratory laparotomy performed and found with stomach perforation s/p washout, gastric perforation repair, and gastrostomy tube placement.  Nephrology consulted for JACQUE    Interval History: NAEON. Patient remains intubated, sedated. Got Lasix 120mg IV x1 to a UOP of 596mL.     Review of patient's allergies indicates:   Allergen Reactions    Codeine Itching     Other reaction(s): Itching    Lipitor [atorvastatin] Other (See Comments)     Other reaction(s): Muscle pain  Muscle cranmps    Morphine Itching     Other reaction(s): nausea and vomiting     Zoloft [sertraline] Other (See Comments)     Tremors/muscle spasms     Current Facility-Administered Medications   Medication Frequency     calcium gluconate 1g in dextrose 5% 100mL (ready to mix system) PRN    calcium gluconate 2 g in dextrose 5 % 100 mL IVPB PRN    calcium gluconate 3 g in dextrose 5 % 100 mL IVPB PRN    dexmedetomidine (PRECEDEX) 400mcg/100mL 0.9% NaCL infusion Continuous    dextrose 50% injection 12.5 g PRN    dextrose 50% injection 25 g PRN    fentaNYL 2500 mcg in 0.9% sodium chloride 250 mL infusion premix (titrating) Continuous    fentaNYL injection 25 mcg Q1H PRN    glucagon (human recombinant) injection 1 mg PRN    glucose chewable tablet 16 g PRN    glucose chewable tablet 24 g PRN    heparin 25,000 units in dextrose 5% (100 units/ml) IV bolus from bag - ADDITIONAL PRN BOLUS - 30 units/kg (max bolus 4000 units) PRN    heparin 25,000 units in dextrose 5% (100 units/ml) IV bolus from bag - ADDITIONAL PRN BOLUS - 60 units/kg (max bolus 4000 units) PRN    heparin 25,000 units in dextrose 5% (100 units/ml) IV bolus from bag INITIAL BOLUS (max bolus 4000 units) Once    heparin 25,000 units in dextrose 5% 250 mL (100 units/mL) infusion LOW INTENSITY nomogram - [facility] Continuous    hydrocortisone sodium succinate injection 100 mg Q8H    HYDROmorphone injection 0.5 mg Q4H PRN    magnesium sulfate 2g in water 50mL IVPB (premix) PRN    magnesium sulfate 2g in water 50mL IVPB (premix) PRN    melatonin tablet 6 mg Nightly PRN    nitroGLYCERIN 2% TD oint ointment 0.5 inch Q6H    norepinephrine 4 mg in dextrose 5% 250 mL infusion (premix) (titrating) Continuous    ondansetron injection 4 mg Q8H PRN    oxyCODONE immediate release tablet 5 mg Q6H PRN    pantoprazole injection 40 mg Daily    piperacillin-tazobactam 4.5 g in sodium chloride 0.9% 100 mL IVPB (ready to mix system) Q8H    potassium chloride 40 mEq in 100 mL IVPB (FOR CENTRAL LINE ADMINISTRATION ONLY) PRN    And    potassium chloride 20 mEq in 100 mL IVPB (FOR CENTRAL LINE ADMINISTRATION ONLY) PRN    And    potassium chloride 40 mEq in 100 mL IVPB (FOR  CENTRAL LINE ADMINISTRATION ONLY) PRN    promethazine tablet 25 mg Q6H PRN    propofol (DIPRIVAN) 10 mg/mL infusion Continuous    sodium chloride 0.9% flush 10 mL PRN    sodium chloride 0.9% flush 10 mL PRN    sodium phosphate 15 mmol in dextrose 5 % 250 mL IVPB PRN    sodium phosphate 20.01 mmol in dextrose 5 % 250 mL IVPB PRN    sodium phosphate 30 mmol in dextrose 5 % 250 mL IVPB PRN    tacrolimus capsule 0.5 mg BID    vasopressin (PITRESSIN) 0.2 Units/mL in dextrose 5 % 100 mL infusion Continuous       Objective:     Vital Signs (Most Recent):  Temp: 97.5 °F (36.4 °C) (09/07/20 1200)  Pulse: 61 (09/07/20 1200)  Resp: 14 (09/07/20 1200)  BP: (!) 98/50 (09/07/20 1200)  SpO2: 95 % (09/07/20 1200)  O2 Device (Oxygen Therapy): ventilator (09/07/20 1200) Vital Signs (24h Range):  Temp:  [97.5 °F (36.4 °C)-99.3 °F (37.4 °C)] 97.5 °F (36.4 °C)  Pulse:  [] 61  Resp:  [12-25] 14  SpO2:  [65 %-100 %] 95 %  BP: ()/(40-85) 98/50  Arterial Line BP: ()/(35-73) 120/58     Weight: 58 kg (127 lb 13.9 oz) (09/06/20 0500)  Body mass index is 25.83 kg/m².  Body surface area is 1.55 meters squared.    I/O last 3 completed shifts:  In: 6396.6 [I.V.:5696.6; Blood:700]  Out: 1887 [Urine:903; Drains:274; Other:710]    Physical Exam  Constitutional:       Interventions: She is intubated.      Comments: Intubated and sedated   HENT:      Head: Normocephalic and atraumatic.   Neck:      Musculoskeletal: Normal range of motion and neck supple. No neck rigidity.   Cardiovascular:      Rate and Rhythm: Normal rate and regular rhythm.      Heart sounds: No murmur. No friction rub. No gallop.    Pulmonary:      Effort: She is intubated.   Abdominal:      General: Abdomen is flat. There is no distension.      Palpations: Abdomen is soft.      Tenderness: There is no abdominal tenderness.   Musculoskeletal: Normal range of motion.      Right lower leg: Edema present.      Left lower leg: Edema present.   Skin:      General: Skin is warm and dry.         Significant Labs:  CBC:   Recent Labs   Lab 09/07/20  0432   WBC 7.42   RBC 3.32*   HGB 9.7*   HCT 29.4*   PLT 79*   MCV 89   MCH 29.2   MCHC 33.0     CMP:   Recent Labs   Lab 09/07/20  0432 09/07/20  1149   *  --    CALCIUM 7.7*  --    ALBUMIN 2.9*  2.9* 2.8*   PROT 5.0*  5.0* 4.9*     --    K 3.8  --    CO2 17*  --      --    BUN 44*  --    CREATININE 2.7*  --    ALKPHOS 154*  154* 190*   *  107* 118*   *  305* 368*   BILITOT 2.7*  2.7* 2.4*     Recent Labs   Lab 09/03/20  0604 09/05/20  1744   COLORU Yellow Latisha   SPECGRAV >=1.030* 1.020   PHUR 7.5 5.0   PROTEINUA 3+* 1+*   BACTERIA Negative None   NITRITE Negative Negative   LEUKOCYTESUR Negative Negative   UROBILINOGEN 1.0  --    HYALINECASTS 25* 0     All labs within the past 24 hours have been reviewed.     Significant Imaging:  Reviewed    Assessment/Plan:     JACQUE (acute kidney injury)  JACQUE most likely secondary to hypoperfusion, likely ATN.  Pt also with non anion gap metabolic acidosis with respiratory compensation  Given Lasix 150mg IV x1 to poor UOP.    - No emergent need for dialysis today  - Agree with lasix challenge, may increase to 200mg IV x1  - Strict I/O and chart   - Renally dose all medications   - Avoid nephrotoxic medications, NSAIDs, IV contrast, ACE/ARB.  - Maintain MAP > 65  - Hb > 7 gm/dL            Thank you for your consult. I will follow-up with patient. Please contact us if you have any additional questions.    James Del Rosario MD  Nephrology  [facility]

## 2020-09-07 NOTE — SUBJECTIVE & OBJECTIVE
Interval History: NAEON. Patient remains intubated, sedated. Got Lasix 120mg IV x1 to a UOP of 596mL.     Review of patient's allergies indicates:   Allergen Reactions    Codeine Itching     Other reaction(s): Itching    Lipitor [atorvastatin] Other (See Comments)     Other reaction(s): Muscle pain  Muscle cranmps    Morphine Itching     Other reaction(s): nausea and vomiting     Zoloft [sertraline] Other (See Comments)     Tremors/muscle spasms     Current Facility-Administered Medications   Medication Frequency    calcium gluconate 1g in dextrose 5% 100mL (ready to mix system) PRN    calcium gluconate 2 g in dextrose 5 % 100 mL IVPB PRN    calcium gluconate 3 g in dextrose 5 % 100 mL IVPB PRN    dexmedetomidine (PRECEDEX) 400mcg/100mL 0.9% NaCL infusion Continuous    dextrose 50% injection 12.5 g PRN    dextrose 50% injection 25 g PRN    fentaNYL 2500 mcg in 0.9% sodium chloride 250 mL infusion premix (titrating) Continuous    fentaNYL injection 25 mcg Q1H PRN    glucagon (human recombinant) injection 1 mg PRN    glucose chewable tablet 16 g PRN    glucose chewable tablet 24 g PRN    heparin 25,000 units in dextrose 5% (100 units/ml) IV bolus from bag - ADDITIONAL PRN BOLUS - 30 units/kg (max bolus 4000 units) PRN    heparin 25,000 units in dextrose 5% (100 units/ml) IV bolus from bag - ADDITIONAL PRN BOLUS - 60 units/kg (max bolus 4000 units) PRN    heparin 25,000 units in dextrose 5% (100 units/ml) IV bolus from bag INITIAL BOLUS (max bolus 4000 units) Once    heparin 25,000 units in dextrose 5% 250 mL (100 units/mL) infusion LOW INTENSITY nomogram - [facility] Continuous    hydrocortisone sodium succinate injection 100 mg Q8H    HYDROmorphone injection 0.5 mg Q4H PRN    magnesium sulfate 2g in water 50mL IVPB (premix) PRN    magnesium sulfate 2g in water 50mL IVPB (premix) PRN    melatonin tablet 6 mg Nightly PRN    nitroGLYCERIN 2% TD oint ointment 0.5 inch Q6H    norepinephrine 4 mg in  dextrose 5% 250 mL infusion (premix) (titrating) Continuous    ondansetron injection 4 mg Q8H PRN    oxyCODONE immediate release tablet 5 mg Q6H PRN    pantoprazole injection 40 mg Daily    piperacillin-tazobactam 4.5 g in sodium chloride 0.9% 100 mL IVPB (ready to mix system) Q8H    potassium chloride 40 mEq in 100 mL IVPB (FOR CENTRAL LINE ADMINISTRATION ONLY) PRN    And    potassium chloride 20 mEq in 100 mL IVPB (FOR CENTRAL LINE ADMINISTRATION ONLY) PRN    And    potassium chloride 40 mEq in 100 mL IVPB (FOR CENTRAL LINE ADMINISTRATION ONLY) PRN    promethazine tablet 25 mg Q6H PRN    propofol (DIPRIVAN) 10 mg/mL infusion Continuous    sodium chloride 0.9% flush 10 mL PRN    sodium chloride 0.9% flush 10 mL PRN    sodium phosphate 15 mmol in dextrose 5 % 250 mL IVPB PRN    sodium phosphate 20.01 mmol in dextrose 5 % 250 mL IVPB PRN    sodium phosphate 30 mmol in dextrose 5 % 250 mL IVPB PRN    tacrolimus capsule 0.5 mg BID    vasopressin (PITRESSIN) 0.2 Units/mL in dextrose 5 % 100 mL infusion Continuous       Objective:     Vital Signs (Most Recent):  Temp: 97.5 °F (36.4 °C) (09/07/20 1200)  Pulse: 61 (09/07/20 1200)  Resp: 14 (09/07/20 1200)  BP: (!) 98/50 (09/07/20 1200)  SpO2: 95 % (09/07/20 1200)  O2 Device (Oxygen Therapy): ventilator (09/07/20 1200) Vital Signs (24h Range):  Temp:  [97.5 °F (36.4 °C)-99.3 °F (37.4 °C)] 97.5 °F (36.4 °C)  Pulse:  [] 61  Resp:  [12-25] 14  SpO2:  [65 %-100 %] 95 %  BP: ()/(40-85) 98/50  Arterial Line BP: ()/(35-73) 120/58     Weight: 58 kg (127 lb 13.9 oz) (09/06/20 0500)  Body mass index is 25.83 kg/m².  Body surface area is 1.55 meters squared.    I/O last 3 completed shifts:  In: 6396.6 [I.V.:5696.6; Blood:700]  Out: 1887 [Urine:903; Drains:274; Other:710]    Physical Exam  Constitutional:       Interventions: She is intubated.      Comments: Intubated and sedated   HENT:      Head: Normocephalic and atraumatic.   Neck:       Musculoskeletal: Normal range of motion and neck supple. No neck rigidity.   Cardiovascular:      Rate and Rhythm: Normal rate and regular rhythm.      Heart sounds: No murmur. No friction rub. No gallop.    Pulmonary:      Effort: She is intubated.   Abdominal:      General: Abdomen is flat. There is no distension.      Palpations: Abdomen is soft.      Tenderness: There is no abdominal tenderness.   Musculoskeletal: Normal range of motion.      Right lower leg: Edema present.      Left lower leg: Edema present.   Skin:     General: Skin is warm and dry.         Significant Labs:  CBC:   Recent Labs   Lab 09/07/20  0432   WBC 7.42   RBC 3.32*   HGB 9.7*   HCT 29.4*   PLT 79*   MCV 89   MCH 29.2   MCHC 33.0     CMP:   Recent Labs   Lab 09/07/20  0432 09/07/20  1149   *  --    CALCIUM 7.7*  --    ALBUMIN 2.9*  2.9* 2.8*   PROT 5.0*  5.0* 4.9*     --    K 3.8  --    CO2 17*  --      --    BUN 44*  --    CREATININE 2.7*  --    ALKPHOS 154*  154* 190*   *  107* 118*   *  305* 368*   BILITOT 2.7*  2.7* 2.4*     Recent Labs   Lab 09/03/20  0604 09/05/20  1744   COLORU Yellow Latisha   SPECGRAV >=1.030* 1.020   PHUR 7.5 5.0   PROTEINUA 3+* 1+*   BACTERIA Negative None   NITRITE Negative Negative   LEUKOCYTESUR Negative Negative   UROBILINOGEN 1.0  --    HYALINECASTS 25* 0     All labs within the past 24 hours have been reviewed.     Significant Imaging:  Reviewed

## 2020-09-07 NOTE — HPI
Ms. Hernandez is a 49yo F w/PMH of von Gierke disease s/p liver transplant (2002, on tacro + MMF, follows Dr. Nielsen), hx chronic rejection (bx 2015 with acute and chronic rejection s/p steroids), biliary stricture and ductopenic rejection, recently with cirrhosis on fibroscan (10/2019), HTN, and depression who presents as a transfer for further evaluation of gastric outlet obstruction and esophageal stricturing.  Patient decompensated requiring multiple pressors and intubation. CT scan showed free air. Prior to intubation patient reported severe abdominal pain.   states that patient has had epigastric pain, nausea, and vomiting for several days.

## 2020-09-07 NOTE — NURSING
Patient moves all extremities during sedation vacation. Hypertension and ventilatory dyssynchrony occurred. After sedation restarted, hypotension resulted; requiring frequent titration of sedation, analgesia and pressors.     Dr. Rodriguez at bedside; no orders given.     SICU team at bedside; VORB to attempt cessation of vasopressin, will titrate Norepi.

## 2020-09-08 LAB
ALBUMIN SERPL BCP-MCNC: 2.3 G/DL (ref 3.5–5.2)
ALBUMIN SERPL BCP-MCNC: 2.3 G/DL (ref 3.5–5.2)
ALBUMIN SERPL BCP-MCNC: 2.5 G/DL (ref 3.5–5.2)
ALBUMIN SERPL BCP-MCNC: 2.6 G/DL (ref 3.5–5.2)
ALP SERPL-CCNC: 206 U/L (ref 55–135)
ALP SERPL-CCNC: 212 U/L (ref 55–135)
ALP SERPL-CCNC: 213 U/L (ref 55–135)
ALP SERPL-CCNC: 215 U/L (ref 55–135)
ALT SERPL W/O P-5'-P-CCNC: 139 U/L (ref 10–44)
ALT SERPL W/O P-5'-P-CCNC: 142 U/L (ref 10–44)
ALT SERPL W/O P-5'-P-CCNC: 143 U/L (ref 10–44)
ALT SERPL W/O P-5'-P-CCNC: 145 U/L (ref 10–44)
AMMONIA PLAS-SCNC: 71 UMOL/L (ref 10–50)
ANION GAP SERPL CALC-SCNC: 15 MMOL/L (ref 8–16)
ANISOCYTOSIS BLD QL SMEAR: SLIGHT
APTT BLDCRRT: 38.8 SEC (ref 21–32)
APTT BLDCRRT: 44.7 SEC (ref 21–32)
APTT BLDCRRT: 48 SEC (ref 21–32)
AST SERPL-CCNC: 464 U/L (ref 10–40)
AST SERPL-CCNC: 475 U/L (ref 10–40)
AST SERPL-CCNC: 541 U/L (ref 10–40)
AST SERPL-CCNC: 570 U/L (ref 10–40)
BASOPHILS # BLD AUTO: ABNORMAL K/UL (ref 0–0.2)
BASOPHILS NFR BLD: 0 % (ref 0–1.9)
BILIRUB DIRECT SERPL-MCNC: 1.6 MG/DL (ref 0.1–0.3)
BILIRUB DIRECT SERPL-MCNC: 1.6 MG/DL (ref 0.1–0.3)
BILIRUB DIRECT SERPL-MCNC: 1.7 MG/DL (ref 0.1–0.3)
BILIRUB DIRECT SERPL-MCNC: 1.9 MG/DL (ref 0.1–0.3)
BILIRUB SERPL-MCNC: 1.8 MG/DL (ref 0.1–1)
BILIRUB SERPL-MCNC: 1.9 MG/DL (ref 0.1–1)
BILIRUB SERPL-MCNC: 2 MG/DL (ref 0.1–1)
BILIRUB SERPL-MCNC: 2.2 MG/DL (ref 0.1–1)
BUN SERPL-MCNC: 57 MG/DL (ref 6–20)
CALCIUM SERPL-MCNC: 7.6 MG/DL (ref 8.7–10.5)
CHLORIDE SERPL-SCNC: 109 MMOL/L (ref 95–110)
CO2 SERPL-SCNC: 16 MMOL/L (ref 23–29)
CREAT SERPL-MCNC: 3 MG/DL (ref 0.5–1.4)
DIFFERENTIAL METHOD: ABNORMAL
EOSINOPHIL # BLD AUTO: ABNORMAL K/UL (ref 0–0.5)
EOSINOPHIL NFR BLD: 0 % (ref 0–8)
ERYTHROCYTE [DISTWIDTH] IN BLOOD BY AUTOMATED COUNT: 15.6 % (ref 11.5–14.5)
EST. GFR  (AFRICAN AMERICAN): 19.8 ML/MIN/1.73 M^2
EST. GFR  (NON AFRICAN AMERICAN): 17.2 ML/MIN/1.73 M^2
GLUCOSE SERPL-MCNC: 103 MG/DL (ref 70–110)
GLUCOSE SERPL-MCNC: 111 MG/DL (ref 70–110)
GLUCOSE SERPL-MCNC: 76 MG/DL (ref 70–110)
HCO3 UR-SCNC: 10.9 MMOL/L (ref 24–28)
HCO3 UR-SCNC: 18.8 MMOL/L (ref 24–28)
HCT VFR BLD AUTO: 32.1 % (ref 37–48.5)
HCT VFR BLD CALC: 21 %PCV (ref 36–54)
HCT VFR BLD CALC: 26 %PCV (ref 36–54)
HGB BLD-MCNC: 10.5 G/DL (ref 12–16)
HYPOCHROMIA BLD QL SMEAR: ABNORMAL
IMM GRANULOCYTES # BLD AUTO: ABNORMAL K/UL (ref 0–0.04)
IMM GRANULOCYTES NFR BLD AUTO: ABNORMAL % (ref 0–0.5)
INR PPP: 1 (ref 0.8–1.2)
LYMPHOCYTES # BLD AUTO: ABNORMAL K/UL (ref 1–4.8)
LYMPHOCYTES NFR BLD: 5 % (ref 18–48)
MCH RBC QN AUTO: 28.8 PG (ref 27–31)
MCHC RBC AUTO-ENTMCNC: 32.7 G/DL (ref 32–36)
MCV RBC AUTO: 88 FL (ref 82–98)
MONOCYTES # BLD AUTO: ABNORMAL K/UL (ref 0.3–1)
MONOCYTES NFR BLD: 7 % (ref 4–15)
NEUTROPHILS NFR BLD: 88 % (ref 38–73)
NRBC BLD-RTO: 0 /100 WBC
OVALOCYTES BLD QL SMEAR: ABNORMAL
PCO2 BLDA: 27.9 MMHG (ref 35–45)
PCO2 BLDA: 32.6 MMHG (ref 35–45)
PH SMN: 7.2 [PH] (ref 7.35–7.45)
PH SMN: 7.37 [PH] (ref 7.35–7.45)
PLATELET # BLD AUTO: 70 K/UL (ref 150–350)
PLATELET BLD QL SMEAR: ABNORMAL
PMV BLD AUTO: 10.6 FL (ref 9.2–12.9)
PO2 BLDA: 157 MMHG (ref 80–100)
PO2 BLDA: 179 MMHG (ref 80–100)
POC BE: -17 MMOL/L
POC BE: -7 MMOL/L
POC IONIZED CALCIUM: 0.94 MMOL/L (ref 1.06–1.42)
POC IONIZED CALCIUM: 1.05 MMOL/L (ref 1.06–1.42)
POC SATURATED O2: 100 % (ref 95–100)
POC SATURATED O2: 99 % (ref 95–100)
POC TCO2: 12 MMOL/L (ref 23–27)
POC TCO2: 20 MMOL/L (ref 23–27)
POCT GLUCOSE: 100 MG/DL (ref 70–110)
POCT GLUCOSE: 105 MG/DL (ref 70–110)
POCT GLUCOSE: 106 MG/DL (ref 70–110)
POCT GLUCOSE: 110 MG/DL (ref 70–110)
POCT GLUCOSE: 135 MG/DL (ref 70–110)
POIKILOCYTOSIS BLD QL SMEAR: SLIGHT
POLYCHROMASIA BLD QL SMEAR: ABNORMAL
POTASSIUM BLD-SCNC: 2.5 MMOL/L (ref 3.5–5.1)
POTASSIUM BLD-SCNC: 4.3 MMOL/L (ref 3.5–5.1)
POTASSIUM SERPL-SCNC: 4.2 MMOL/L (ref 3.5–5.1)
PROT SERPL-MCNC: 4.8 G/DL (ref 6–8.4)
PROT SERPL-MCNC: 4.9 G/DL (ref 6–8.4)
PROT SERPL-MCNC: 4.9 G/DL (ref 6–8.4)
PROT SERPL-MCNC: 5 G/DL (ref 6–8.4)
PROTHROMBIN TIME: 11.1 SEC (ref 9–12.5)
RBC # BLD AUTO: 3.64 M/UL (ref 4–5.4)
SAMPLE: ABNORMAL
SAMPLE: ABNORMAL
SODIUM BLD-SCNC: 137 MMOL/L (ref 136–145)
SODIUM BLD-SCNC: 142 MMOL/L (ref 136–145)
SODIUM SERPL-SCNC: 140 MMOL/L (ref 136–145)
TACROLIMUS BLD-MCNC: 2.6 NG/ML (ref 5–15)
WBC # BLD AUTO: 9.07 K/UL (ref 3.9–12.7)

## 2020-09-08 PROCEDURE — 85027 COMPLETE CBC AUTOMATED: CPT

## 2020-09-08 PROCEDURE — 85610 PROTHROMBIN TIME: CPT

## 2020-09-08 PROCEDURE — 99900035 HC TECH TIME PER 15 MIN (STAT)

## 2020-09-08 PROCEDURE — 94010 BREATHING CAPACITY TEST: CPT

## 2020-09-08 PROCEDURE — 63600175 PHARM REV CODE 636 W HCPCS: Performed by: STUDENT IN AN ORGANIZED HEALTH CARE EDUCATION/TRAINING PROGRAM

## 2020-09-08 PROCEDURE — 27000221 HC OXYGEN, UP TO 24 HOURS

## 2020-09-08 PROCEDURE — 85730 THROMBOPLASTIN TIME PARTIAL: CPT | Mod: 91

## 2020-09-08 PROCEDURE — 85730 THROMBOPLASTIN TIME PARTIAL: CPT

## 2020-09-08 PROCEDURE — 27200966 HC CLOSED SUCTION SYSTEM

## 2020-09-08 PROCEDURE — 99232 PR SUBSEQUENT HOSPITAL CARE,LEVL II: ICD-10-PCS | Mod: ,,, | Performed by: INTERNAL MEDICINE

## 2020-09-08 PROCEDURE — 20000000 HC ICU ROOM

## 2020-09-08 PROCEDURE — 63600175 PHARM REV CODE 636 W HCPCS: Performed by: SURGERY

## 2020-09-08 PROCEDURE — 99291 PR CRITICAL CARE, E/M 30-74 MINUTES: ICD-10-PCS | Mod: 24,,, | Performed by: SURGERY

## 2020-09-08 PROCEDURE — 82140 ASSAY OF AMMONIA: CPT

## 2020-09-08 PROCEDURE — 25000003 PHARM REV CODE 250: Performed by: INTERNAL MEDICINE

## 2020-09-08 PROCEDURE — 25000003 PHARM REV CODE 250: Performed by: STUDENT IN AN ORGANIZED HEALTH CARE EDUCATION/TRAINING PROGRAM

## 2020-09-08 PROCEDURE — 85007 BL SMEAR W/DIFF WBC COUNT: CPT

## 2020-09-08 PROCEDURE — 94761 N-INVAS EAR/PLS OXIMETRY MLT: CPT

## 2020-09-08 PROCEDURE — 99232 SBSQ HOSP IP/OBS MODERATE 35: CPT | Mod: ,,, | Performed by: INTERNAL MEDICINE

## 2020-09-08 PROCEDURE — 99900017 HC EXTUBATION W/PARAMETERS (STAT)

## 2020-09-08 PROCEDURE — 99291 CRITICAL CARE FIRST HOUR: CPT | Mod: 24,,, | Performed by: SURGERY

## 2020-09-08 PROCEDURE — 94150 VITAL CAPACITY TEST: CPT

## 2020-09-08 PROCEDURE — 80076 HEPATIC FUNCTION PANEL: CPT | Mod: 91

## 2020-09-08 PROCEDURE — 80076 HEPATIC FUNCTION PANEL: CPT

## 2020-09-08 PROCEDURE — 80197 ASSAY OF TACROLIMUS: CPT

## 2020-09-08 PROCEDURE — C9113 INJ PANTOPRAZOLE SODIUM, VIA: HCPCS | Performed by: STUDENT IN AN ORGANIZED HEALTH CARE EDUCATION/TRAINING PROGRAM

## 2020-09-08 PROCEDURE — 25000003 PHARM REV CODE 250: Performed by: SURGERY

## 2020-09-08 PROCEDURE — 99900026 HC AIRWAY MAINTENANCE (STAT)

## 2020-09-08 PROCEDURE — 94003 VENT MGMT INPAT SUBQ DAY: CPT

## 2020-09-08 PROCEDURE — 94760 N-INVAS EAR/PLS OXIMETRY 1: CPT

## 2020-09-08 PROCEDURE — 80048 BASIC METABOLIC PNL TOTAL CA: CPT

## 2020-09-08 RX ORDER — PROPOFOL 10 MG/ML
5 INJECTION, EMULSION INTRAVENOUS CONTINUOUS
Status: DISCONTINUED | OUTPATIENT
Start: 2020-09-08 | End: 2020-09-08

## 2020-09-08 RX ORDER — QUETIAPINE FUMARATE 100 MG/1
100 TABLET, FILM COATED ORAL 2 TIMES DAILY
Status: DISCONTINUED | OUTPATIENT
Start: 2020-09-08 | End: 2020-09-09

## 2020-09-08 RX ORDER — NOREPINEPHRINE BITARTRATE/D5W 4MG/250ML
0.02 PLASTIC BAG, INJECTION (ML) INTRAVENOUS CONTINUOUS
Status: DISCONTINUED | OUTPATIENT
Start: 2020-09-08 | End: 2020-09-09

## 2020-09-08 RX ORDER — TACROLIMUS 1 MG/1
1 CAPSULE ORAL 2 TIMES DAILY
Status: CANCELLED | OUTPATIENT
Start: 2020-09-08

## 2020-09-08 RX ORDER — OXYCODONE AND ACETAMINOPHEN 7.5; 325 MG/1; MG/1
1 TABLET ORAL 4 TIMES DAILY
Status: DISCONTINUED | OUTPATIENT
Start: 2020-09-08 | End: 2020-09-09

## 2020-09-08 RX ORDER — LORAZEPAM 2 MG/ML
2 INJECTION INTRAMUSCULAR
Status: DISCONTINUED | OUTPATIENT
Start: 2020-09-08 | End: 2020-09-08

## 2020-09-08 RX ADMIN — NITROGLYCERIN 0.5 INCH: 20 OINTMENT TOPICAL at 05:09

## 2020-09-08 RX ADMIN — TACROLIMUS 0.5 MG: 0.5 CAPSULE ORAL at 08:09

## 2020-09-08 RX ADMIN — NITROGLYCERIN 0.5 INCH: 20 OINTMENT TOPICAL at 12:09

## 2020-09-08 RX ADMIN — Medication 100 MCG/HR: at 09:09

## 2020-09-08 RX ADMIN — SODIUM CHLORIDE, SODIUM LACTATE, POTASSIUM CHLORIDE, AND CALCIUM CHLORIDE 500 ML: .6; .31; .03; .02 INJECTION, SOLUTION INTRAVENOUS at 10:09

## 2020-09-08 RX ADMIN — OXYCODONE HYDROCHLORIDE AND ACETAMINOPHEN 1 TABLET: 7.5; 325 TABLET ORAL at 08:09

## 2020-09-08 RX ADMIN — PIPERACILLIN SODIUM AND TAZOBACTAM SODIUM 4.5 G: 4; .5 INJECTION, POWDER, LYOPHILIZED, FOR SOLUTION INTRAVENOUS at 02:09

## 2020-09-08 RX ADMIN — HYDROCORTISONE SODIUM SUCCINATE 100 MG: 100 INJECTION, POWDER, FOR SOLUTION INTRAMUSCULAR; INTRAVENOUS at 02:09

## 2020-09-08 RX ADMIN — OXYCODONE HYDROCHLORIDE AND ACETAMINOPHEN 1 TABLET: 7.5; 325 TABLET ORAL at 02:09

## 2020-09-08 RX ADMIN — PANTOPRAZOLE SODIUM 40 MG: 40 INJECTION, POWDER, LYOPHILIZED, FOR SOLUTION INTRAVENOUS at 08:09

## 2020-09-08 RX ADMIN — QUETIAPINE FUMARATE 100 MG: 100 TABLET ORAL at 08:09

## 2020-09-08 RX ADMIN — PIPERACILLIN SODIUM AND TAZOBACTAM SODIUM 4.5 G: 4; .5 INJECTION, POWDER, LYOPHILIZED, FOR SOLUTION INTRAVENOUS at 08:09

## 2020-09-08 RX ADMIN — TACROLIMUS 0.5 MG: 0.5 CAPSULE ORAL at 05:09

## 2020-09-08 RX ADMIN — LORAZEPAM 2 MG: 2 INJECTION INTRAMUSCULAR; INTRAVENOUS at 09:09

## 2020-09-08 RX ADMIN — HEPARIN SODIUM AND DEXTROSE 10 UNITS/KG/HR: 10000; 5 INJECTION INTRAVENOUS at 05:09

## 2020-09-08 RX ADMIN — QUETIAPINE FUMARATE 100 MG: 100 TABLET ORAL at 10:09

## 2020-09-08 RX ADMIN — OXYCODONE HYDROCHLORIDE AND ACETAMINOPHEN 1 TABLET: 7.5; 325 TABLET ORAL at 05:09

## 2020-09-08 RX ADMIN — OXYCODONE HYDROCHLORIDE AND ACETAMINOPHEN 1 TABLET: 7.5; 325 TABLET ORAL at 10:09

## 2020-09-08 RX ADMIN — DEXMEDETOMIDINE HYDROCHLORIDE 1 MCG/KG/HR: 4 INJECTION, SOLUTION INTRAVENOUS at 02:09

## 2020-09-08 RX ADMIN — HYDROCORTISONE SODIUM SUCCINATE 100 MG: 100 INJECTION, POWDER, FOR SOLUTION INTRAMUSCULAR; INTRAVENOUS at 05:09

## 2020-09-08 RX ADMIN — DEXMEDETOMIDINE HYDROCHLORIDE 0.4 MCG/KG/HR: 4 INJECTION, SOLUTION INTRAVENOUS at 11:09

## 2020-09-08 RX ADMIN — HYDROCORTISONE SODIUM SUCCINATE 100 MG: 100 INJECTION, POWDER, FOR SOLUTION INTRAMUSCULAR; INTRAVENOUS at 10:09

## 2020-09-08 RX ADMIN — Medication 0.04 MCG/KG/MIN: at 03:09

## 2020-09-08 NOTE — TREATMENT PLAN
Hepatology Treatment Plan    Elda Hernandez is a 50 y.o. female admitted to hospital 9/3/2020 (Hospital Day: 6) due to Perforated abdominal viscus. Hepatology following for immunosuppression management.    Patient still intubated, sedated.    Lab Results   Component Value Date    TACROLIMUS 2.6 (L) 09/08/2020    TACROLIMUS 1.9 (L) 09/07/2020    TACROLIMUS 2.3 (L) 09/06/2020       Lab Results   Component Value Date    CREATININE 3.0 (H) 09/08/2020    CREATININE 2.9 (H) 09/07/2020    CREATININE 2.7 (H) 09/07/2020       Lab Results   Component Value Date     (H) 09/08/2020     (H) 09/08/2020     (H) 04/07/2018    ALKPHOS 215 (H) 09/08/2020    BILITOT 2.0 (H) 09/08/2020    WBC 9.07 09/08/2020    HGB 10.5 (L) 09/08/2020    PLT 70 (L) 09/08/2020       Kidney function is worsening  Liver enzymes are worsening    Plan  - Changes to immunosuppression regimen as below  - Will need liver biopsy when patient more stable    Immunosuppression Regimen:  Tacrolimus: 1mg qAM, 1mg qPM  CellCept: continue to hold    Please notify hepatology on day of discharge to discuss discharge medications and follow up.     Please obtain daily CBC, BMP, LFT, INR, immunosuppressant trough level    Thank you for involving us in the care of Elda Hernandez. Please call with any additional concerns or questions.    Js Matias MD  Gastroenterology Fellow

## 2020-09-08 NOTE — ASSESSMENT & PLAN NOTE
Neuro:  -sedation: precedex gtt  -analgesia: fentanyl gtt   - seroquel and percocet scheduled   - ativan and dilaudid PRN to assist with pressor weaning     CV:  - Vascular surgery consulted for R radial artery occlusion; appreciate recs.   - Heparin gtt 10mg/kg/hr   - plan to d/c heparin gtt, will discuss with vascular surgery before doing so  - patient off of pressors  - titrate to MAP>60    Pulm:  - intubated, mechanically ventilated.    - Daily CXR  - patient failed SBT this am due to apnea  - plan for SBT trial this afternoon as sedation is weaned     FEN/GI: Perforated stomach; s/p washout and patch (9/4); abdominal closure (9/6)  - s/p washout and closure on 9/6   - d/c zosyn today (day 5)  - start trickle feeds today  - Pantoprazole 40mg IV daily  - Daily metabolic panel with PRN repletion of electrolytes     Renal: Pt with JACQUE upon arrival; BUN/Cr: 32/2.1 (9/4)  - patient with oliguria, anuria over last 24hrs  - JACQUE worsening; BUN/Cr 57/3.0  - Continue to monitor with daily metabolic labs  - lasix challenge 9/6 with no improvement in UOP  - nephrology on board, appreciating recs     HEME/ID: Pt septic and given several L of fluid upon arrival to SICU  - trend lactate daily  - 9/5: 2 U pRBC given with appropriate response  - Hb stable; 9.7 today  - daily CBC; stable H/H  - s/p Liver transplant in 2002; Daily tacrolimus level with AM labs. Per hepatology, restart tacrolimus 1mg BID  - Consult ID     Endo:  - Hx of hypothyroidism; not taking synthroid at home    Dispo:  - Continue SICU care

## 2020-09-08 NOTE — PLAN OF CARE
SW is following this Pt for DC planning needs. There are no identified needs at this time. Discharge Disposition: TBD - pending patient progress; patient intubated/sedated    SW will continue to coordinate with patient, family, team and insurance to complete patient's discharge plan.    Tigist Moody LMSW   - Case Management

## 2020-09-08 NOTE — ASSESSMENT & PLAN NOTE
50 y.o. female w/ free air on CT scan. S/p emergent ex lap on 9/4 w/ findings of gastric perforation, primary repair. Now 2 Days Post-Op from abdominal closure on 9/6, skin stapled closed, KERRY drains removed.     Continue SICU care  Wean to extubate today  Follow up ammonia levels  Will need tube study prior to consider feeding, given questionable history of gastric outlet obstruction    Remains critically ill in the SICU

## 2020-09-08 NOTE — NURSING
Pt extubated to 2 L Nasal Cannula by RT per MD order. Following extubation, bilateral breath sounds noted. Restraints removed safely w/o injury. Pt's family brought to bedside, updated on the PoC for remainder of shift.

## 2020-09-08 NOTE — PROGRESS NOTES
[facility]  Nephrology  Progress Note    Patient Name: Elda Hernandez  MRN: [id number]  Admission Date: 9/3/2020  Hospital Length of Stay: 5 days  Attending Provider: Clark Rodriguez MD   Primary Care Physician: Jordana Woods MD  Principal Problem:Perforated abdominal viscus    Subjective:     HPI: 50 y.o. female that has a past medical history of Angiolipoma of kidney (10/1/2018), Arnold-Chiari malformation, Depression, Esophageal stricture, Essential tremor, Hypertension, Liver fibrosis, Osteoporosis, Recurrent urinary tract infection, Seizures, SIADH (syndrome of inappropriate ADH production), Squamous cell carcinoma (10/2014), and Von Gierke disease s/p liver transplant (2002, on tacro + MMF), HTN, and depression that presented to OSH due to worsening diffuse abdominal for 3 weeks, associated with nausea, vomiting and decreased PO intake. Abdominal CT scan showed gastric outlet narrowing, obstructive-related changes at pyloric-duodenal junction, and thickening of GE junction. EGD on 9/3 found with grade IV esophagitis with stricture. Pt transferred for GI evaluation and found with hypotension requiring vasopressors and lactic Acid of 3.9. Repeat CT showed decompressed abdomen and evidence of free air. Exploratory laparotomy performed and found with stomach perforation s/p washout, gastric perforation repair, and gastrostomy tube placement.  Nephrology consulted for JACQUE    Interval History: Patient seen and examined at bedside, no acute events overnight. 500cc of urine documented over the past 24 hours.     Review of patient's allergies indicates:   Allergen Reactions    Codeine Itching     Other reaction(s): Itching    Lipitor [atorvastatin] Other (See Comments)     Other reaction(s): Muscle pain  Muscle cranmps    Morphine Itching     Other reaction(s): nausea and vomiting     Zoloft [sertraline] Other (See Comments)     Tremors/muscle spasms     Current Facility-Administered  Medications   Medication Frequency    dexmedetomidine (PRECEDEX) 400mcg/100mL 0.9% NaCL infusion Continuous    dextrose 50% injection 12.5 g PRN    dextrose 50% injection 25 g PRN    fentaNYL 2500 mcg in 0.9% sodium chloride 250 mL infusion premix (titrating) Continuous    fentaNYL injection 25 mcg Q1H PRN    glucagon (human recombinant) injection 1 mg PRN    glucose chewable tablet 16 g PRN    glucose chewable tablet 24 g PRN    heparin 25,000 units in dextrose 5% (100 units/ml) IV bolus from bag - ADDITIONAL PRN BOLUS - 30 units/kg (max bolus 4000 units) PRN    heparin 25,000 units in dextrose 5% (100 units/ml) IV bolus from bag - ADDITIONAL PRN BOLUS - 60 units/kg (max bolus 4000 units) PRN    heparin 25,000 units in dextrose 5% 250 mL (100 units/mL) infusion LOW INTENSITY nomogram - [facility] Continuous    hydrocortisone sodium succinate injection 100 mg Q8H    HYDROmorphone injection 0.5 mg Q4H PRN    lorazepam injection 2 mg Q15 Min PRN    melatonin tablet 6 mg Nightly PRN    nitroGLYCERIN 2% TD oint ointment 0.5 inch Q6H    norepinephrine 4 mg in dextrose 5% 250 mL infusion (premix) (titrating) Continuous    ondansetron injection 4 mg Q8H PRN    oxyCODONE immediate release tablet 5 mg Q6H PRN    oxyCODONE-acetaminophen 7.5-325 mg per tablet 1 tablet QID    pantoprazole injection 40 mg Daily    promethazine tablet 25 mg Q6H PRN    propofol (DIPRIVAN) 10 mg/mL infusion Continuous    QUEtiapine tablet 100 mg BID    sodium chloride 0.9% flush 10 mL PRN    sodium chloride 0.9% flush 10 mL PRN    tacrolimus capsule 0.5 mg BID       Objective:     Vital Signs (Most Recent):  Temp: 98.2 °F (36.8 °C) (09/08/20 1100)  Pulse: (!) 48 (09/08/20 1400)  Resp: 14 (09/08/20 1400)  BP: (!) 82/53 (09/08/20 1400)  SpO2: 100 % (09/08/20 1400)  O2 Device (Oxygen Therapy): ventilator (09/08/20 1400) Vital Signs (24h Range):  Temp:  [97.5 °F (36.4 °C)-98.9 °F (37.2 °C)] 98.2 °F (36.8 °C)  Pulse:  [48-87]  48  Resp:  [12-54] 14  SpO2:  [91 %-100 %] 100 %  BP: ()/(45-73) 82/53  Arterial Line BP: ()/(42-92) 98/48     Weight: 58 kg (127 lb 13.9 oz) (09/06/20 0500)  Body mass index is 25.83 kg/m².  Body surface area is 1.55 meters squared.    I/O last 3 completed shifts:  In: 2018.7 [I.V.:2018.7]  Out: 924 [Urine:699; Drains:225]    Physical Exam  Constitutional:       Interventions: She is intubated.      Comments: Intubated and sedated   HENT:      Head: Normocephalic and atraumatic.   Neck:      Musculoskeletal: Normal range of motion and neck supple. No neck rigidity.   Cardiovascular:      Rate and Rhythm: Normal rate and regular rhythm.      Heart sounds: No murmur. No friction rub. No gallop.    Pulmonary:      Effort: She is intubated.   Abdominal:      General: Abdomen is flat. There is no distension.      Palpations: Abdomen is soft.      Tenderness: There is no abdominal tenderness.   Musculoskeletal: Normal range of motion.      Right lower leg: Edema present.      Left lower leg: Edema present.   Skin:     General: Skin is warm and dry.         Significant Labs:  CBC:   Recent Labs   Lab 09/08/20 0319   WBC 9.07   RBC 3.64*   HGB 10.5*   HCT 32.1*   PLT 70*   MCV 88   MCH 28.8   MCHC 32.7     CMP:   Recent Labs   Lab 09/08/20 0319 09/08/20  1201   *  --   --    CALCIUM 7.6*  --   --    ALBUMIN  --    < > 2.3*   PROT  --    < > 4.8*     --   --    K 4.2  --   --    CO2 16*  --   --      --   --    BUN 57*  --   --    CREATININE 3.0*  --   --    ALKPHOS  --    < > 206*   ALT  --    < > 139*   AST  --    < > 475*   BILITOT  --    < > 1.8*    < > = values in this interval not displayed.     Coagulation:   Recent Labs   Lab 09/08/20 0319 09/08/20  1201   INR 1.0  --    APTT 44.7* 48.0*     All labs within the past 24 hours have been reviewed.     Significant Imaging:  Labs: Reviewed    Assessment/Plan:     JACQUE (acute kidney injury)  JACQUE most likely secondary to hypoperfusion,  likely ATN.  Pt also with non anion gap metabolic acidosis with respiratory compensation  Given Lasix 150mg IV x1 to poor UOP on 09/07    - No emergent need for dialysis today  - Please start Lasix 80mg TID for diuretic challenge  - Strict I/O and chart   - Renally dose all medications   - Avoid nephrotoxic medications, NSAIDs, IV contrast, ACE/ARB.  - Maintain MAP > 65  - Hb > 7 gm/dL        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  Management as per Hepatology   On Prograf and Hydrocortisone 100mg IV q 8 hrs         Thank you for your consult. I will follow-up with patient. Please contact us if you have any additional questions.    Al Shea MD  Nephrology  [facility]

## 2020-09-08 NOTE — NURSING
"      SICU PLAN OF CARE NOTE    Dx: Perforated abdominal viscus    Shift Events: extubated to 2L NC, moved to new bed per wound care request; plan to start TF when bag comes up from nutrition    Goals of Care: MAP >60, monitor O2 overnight; increased awareness/mental status    Neuro: Arouses to Voice and Moves All Extremities    Vital Signs: BP (!) 92/51 (BP Location: Left arm, Patient Position: Lying)   Pulse 78   Temp 98.2 °F (36.8 °C) (Oral)   Resp (!) 36   Ht 4' 11" (1.499 m)   Wt 58 kg (127 lb 13.9 oz)   SpO2 (!) 94%   BMI 25.83 kg/m²     Respiratory: Nasal Cannula    Diet: NPO    Gtts: Heparin    Urine Output: Urinary Catheter 10-20 cc/hour    Old KERRY site LLQ with ~400cc serous fluid; ostomy bag applied; plan for SICU resident to place suture tonight     Labs/Accuchecks: accuchecks q4.    Skin: no new breakdown or tears noted; weight shift assistance provided; pt turned q2 hours; wound care at bedside to assess skin    Pt not oriented and very hard to redirect; noticeably agitated after extubation- unable to use precedex due to bradycardia in the 40's. Intermittently on levo throughout shift; Pt currently resting comfortably. See flowsheets for full assessment data. WCTM.       "

## 2020-09-08 NOTE — NURSING
"      SICU PLAN OF CARE NOTE    Dx: Perforated abdominal viscus    Shift Events: Goal for the day was to wean propofol and begin SBT. Weaning was partially successful, as patient is sensitive for bradycardia related to Precedex. Propofol remains at 5 mcg/kg/min. Fentanyl successfully weaned to 25 mcg/hr. Patient successfully weaned from Vasopressin. Patient required multiple titrations, pauses and restarts of Levophed. One dose of Dilaudid 0.5mg administered for analgesia/anxiolysis. Unfortunately, patient unable to complete SBT and she only breathed over the vent during episodes of RASS no less than +3 with concomitant hypertension. Episodes of OU > 20 mL/hr mixed with gross oliguria. Heparin nomogram followed. Potassium replacement deferred to night RNLyla, as no open IV line available.     Goals of Care: Retry further weaning and SBT on 09/08    Neuro: During the lightest sedation patient responds to myself and spouse to Yes/No questions. Moves all extremities. Does not grasp my hands when asked.     Vital Signs: BP (!) 86/52   Pulse 60   Temp 97.5 °F (36.4 °C)   Resp 16   Ht 4' 11" (1.499 m)   Wt 58 kg (127 lb 13.9 oz)   SpO2 97%   BMI 25.83 kg/m²     Respiratory: Ventilator    Diet: NPO    Gtts: Propfol, Fentanyl, Precedex, Norepinephrine and Heparin    Urine Output: Urinary Catheter 290 cc/shift    Drains: G-tube/J-tube, total output 100 cc /  shift    Restraints soft, bilateral           "

## 2020-09-08 NOTE — RESPIRATORY THERAPY
Pt extubated to 2L NC with no adverse reactions. See vent flowsheet for parameters. Will continue to monitor.

## 2020-09-08 NOTE — PLAN OF CARE
09/08/20 1011   Discharge Reassessment   Assessment Type Discharge Planning Reassessment   Discharge plan remains the same: Yes   Provided patient/caregiver education on the expected discharge date and the discharge plan Yes   Do you have any problems affording any of your prescribed medications? No   Discharge Plan A Home Health   Discharge Plan B Home with family   DME Needed Upon Discharge  other (see comments)  (TBD)       Susana Pringle MPH, RN, CM  Ext. [phone]

## 2020-09-08 NOTE — PROGRESS NOTES
[facility]  General Surgery  Progress Note    Subjective:     History of Present Illness:  Ms. Hernandez is a 51yo F w/PMH of von Gierke disease s/p liver transplant (2002, on tacro + MMF, follows Dr. Nielsen), hx chronic rejection (bx 2015 with acute and chronic rejection s/p steroids), biliary stricture and ductopenic rejection, recently with cirrhosis on fibroscan (10/2019), HTN, and depression who presents as a transfer for further evaluation of gastric outlet obstruction and esophageal stricturing.  Patient decompensated requiring multiple pressors and intubation. CT scan showed free air. Prior to intubation patient reported severe abdominal pain.   states that patient has had epigastric pain, nausea, and vomiting for several days.     Post-Op Info:  Procedure(s) (LRB):  LAPAROTOMY, EXPLORATORY with abd closure (N/A)   2 Days Post-Op     Interval History:   NAEON  Patient moving all extremities but not following commands  LFTs continue to be elevated    Medications:  Continuous Infusions:   dexmedetomidine (PRECEDEX) infusion 0.8 mcg/kg/hr (09/08/20 0700)    fentanyl 7.5 mL/hr at 09/08/20 0700    heparin (porcine) in D5W 10 Units/kg/hr (09/08/20 0700)    norepinephrine bitartrate-D5W Stopped (09/07/20 1731)    propofoL Stopped (09/07/20 2100)    vasopressin (PITRESSIN) infusion Stopped (09/07/20 0956)     Scheduled Meds:   hydrocortisone sodium succinate  100 mg Intravenous Q8H    nitroGLYCERIN 2% TD oint  0.5 inch Topical (Top) Q6H    oxyCODONE-acetaminophen  1 tablet Oral QID    pantoprazole  40 mg Intravenous Daily    piperacillin-tazobactam (ZOSYN) IVPB  4.5 g Intravenous Q8H    QUEtiapine  100 mg Oral BID    tacrolimus  0.5 mg Sublingual BID     PRN Meds:calcium gluconate IVPB, calcium gluconate IVPB, calcium gluconate IVPB, dextrose 50%, dextrose 50%, fentaNYL, glucagon (human recombinant), glucose, glucose, heparin (PORCINE), heparin (PORCINE), HYDROmorphone,  lorazepam, magnesium sulfate IVPB, magnesium sulfate IVPB, melatonin, ondansetron, oxyCODONE, potassium chloride in water **AND** potassium chloride in water **AND** potassium chloride in water, promethazine, sodium chloride 0.9%, sodium chloride 0.9%, sodium phosphate IVPB, sodium phosphate IVPB, sodium phosphate IVPB     Review of patient's allergies indicates:   Allergen Reactions    Codeine Itching     Other reaction(s): Itching    Lipitor [atorvastatin] Other (See Comments)     Other reaction(s): Muscle pain  Muscle cranmps    Morphine Itching     Other reaction(s): nausea and vomiting     Zoloft [sertraline] Other (See Comments)     Tremors/muscle spasms     Objective:     Vital Signs (Most Recent):  Temp: 98.4 °F (36.9 °C) (09/08/20 0700)  Pulse: (!) 51 (09/08/20 0730)  Resp: 12 (09/08/20 0730)  BP: (!) 89/54 (09/08/20 0700)  SpO2: 100 % (09/08/20 0730) Vital Signs (24h Range):  Temp:  [97.5 °F (36.4 °C)-98.9 °F (37.2 °C)] 98.4 °F (36.9 °C)  Pulse:  [] 51  Resp:  [12-54] 12  SpO2:  [65 %-100 %] 100 %  BP: ()/(40-85) 89/54  Arterial Line BP: ()/(35-92) 109/49     Weight: 58 kg (127 lb 13.9 oz)  Body mass index is 25.83 kg/m².    Intake/Output - Last 3 Shifts       09/06 0700 - 09/07 0659 09/07 0700 - 09/08 0659 09/08 0700 - 09/09 0659    I.V. (mL/kg) 2962.9 (51.1) 1258.9 (21.7)     Blood       Total Intake(mL/kg) 2962.9 (51.1) 1258.9 (21.7)     Urine (mL/kg/hr) 596 (0.4) 502 (0.4) 20 (0.5)    Drains 149 225     Other 160      Total Output 905 727 20    Net +2057.9 +531.9 -20                 Physical Exam  Constitutional:       General: She is not in acute distress.  Cardiovascular:      Rate and Rhythm: Normal rate.   Pulmonary:      Comments: Vent Mode: A/C  Oxygen Concentration (%):  (40-50) 40  Resp Rate Total:  (14 br/min-23 br/min) 14 br/min  Vt Set:  (330 mL) 330 mL  PEEP/CPAP:  (5 cmH20) 5 cmH20  Mean Airway Pressure:  (6.1 cmH20-9.2 cmH20) 7.1 cmH20    Abdominal:      General:  There is no distension.      Palpations: Abdomen is soft.      Tenderness: There is no abdominal tenderness.      Comments: G tube    Skin:     General: Skin is warm and dry.         Significant Labs:  CBC:   Recent Labs   Lab 09/08/20 0319   WBC 9.07   RBC 3.64*   HGB 10.5*   HCT 32.1*   PLT 70*   MCV 88   MCH 28.8   MCHC 32.7     CMP:   Recent Labs   Lab 09/08/20 0319 09/08/20 0527   *  --    CALCIUM 7.6*  --    ALBUMIN  --  2.5*   PROT  --  4.9*     --    K 4.2  --    CO2 16*  --      --    BUN 57*  --    CREATININE 3.0*  --    ALKPHOS  --  215*   ALT  --  142*   AST  --  541*   BILITOT  --  2.0*       Significant Diagnostics:  I have reviewed all pertinent imaging results/findings within the past 24 hours.    Assessment/Plan:     * Perforated abdominal viscus  50 y.o. female w/ free air on CT scan. S/p emergent ex lap on 9/4 w/ findings of gastric perforation, primary repair. Now 2 Days Post-Op from abdominal closure on 9/6, skin stapled closed, KERRY drains removed.     Continue SICU care  Wean to extubate today  Follow up ammonia levels  Will need tube study prior to consider feeding, given questionable history of gastric outlet obstruction    Remains critically ill in the SICU         Cookie Juarez MD  General Surgery  [facility]

## 2020-09-08 NOTE — PLAN OF CARE
SICU PLAN OF CARE NOTE     Dx: Perforated abdominal viscus     Shift Events: Off pressors throughout the night, sedation titrated, off propofol, doing well on precedex and fentanyl, intermittently agitated.      Goals of Care: SBT in am, wean vent and sedation as tolerated     Neuro: Pupils equal, moves all extremities, Does NOT follow commands. She will turn her head toward me when I say her name but will not respond appropriately otherwise.     Vital Signs: BP MAPs in 70's  Pulse 53-80s  Temp 97.7 °F  Resp 14   SpO2 100%       Respiratory: Ventilator AC 40% FiO2, 5 PEEP     Diet: NPO     Gtts: Fentanyl, Precedex, and Heparin     Urine Output: mei, 200 cc/shift     Drains: G-tube to gravity, 125 cc bilious output/shift    Skin: Turned q2 hours, foam on sacrum, waffle mattress inflated and bony prominences protected.

## 2020-09-08 NOTE — ASSESSMENT & PLAN NOTE
JACQUE most likely secondary to hypoperfusion, likely ATN.  Pt also with non anion gap metabolic acidosis with respiratory compensation  Given Lasix 150mg IV x1 to poor UOP on 09/07    - No emergent need for dialysis today  - Please start Lasix 80mg TID for diuretic challenge  - Strict I/O and chart   - Renally dose all medications   - Avoid nephrotoxic medications, NSAIDs, IV contrast, ACE/ARB.  - Maintain MAP > 65  - Hb > 7 gm/dL

## 2020-09-08 NOTE — SUBJECTIVE & OBJECTIVE
Interval History: Patient seen and examined at bedside, no acute events overnight. 500cc of urine documented over the past 24 hours.     Review of patient's allergies indicates:   Allergen Reactions    Codeine Itching     Other reaction(s): Itching    Lipitor [atorvastatin] Other (See Comments)     Other reaction(s): Muscle pain  Muscle cranmps    Morphine Itching     Other reaction(s): nausea and vomiting     Zoloft [sertraline] Other (See Comments)     Tremors/muscle spasms     Current Facility-Administered Medications   Medication Frequency    dexmedetomidine (PRECEDEX) 400mcg/100mL 0.9% NaCL infusion Continuous    dextrose 50% injection 12.5 g PRN    dextrose 50% injection 25 g PRN    fentaNYL 2500 mcg in 0.9% sodium chloride 250 mL infusion premix (titrating) Continuous    fentaNYL injection 25 mcg Q1H PRN    glucagon (human recombinant) injection 1 mg PRN    glucose chewable tablet 16 g PRN    glucose chewable tablet 24 g PRN    heparin 25,000 units in dextrose 5% (100 units/ml) IV bolus from bag - ADDITIONAL PRN BOLUS - 30 units/kg (max bolus 4000 units) PRN    heparin 25,000 units in dextrose 5% (100 units/ml) IV bolus from bag - ADDITIONAL PRN BOLUS - 60 units/kg (max bolus 4000 units) PRN    heparin 25,000 units in dextrose 5% 250 mL (100 units/mL) infusion LOW INTENSITY nomogram - [facility] Continuous    hydrocortisone sodium succinate injection 100 mg Q8H    HYDROmorphone injection 0.5 mg Q4H PRN    lorazepam injection 2 mg Q15 Min PRN    melatonin tablet 6 mg Nightly PRN    nitroGLYCERIN 2% TD oint ointment 0.5 inch Q6H    norepinephrine 4 mg in dextrose 5% 250 mL infusion (premix) (titrating) Continuous    ondansetron injection 4 mg Q8H PRN    oxyCODONE immediate release tablet 5 mg Q6H PRN    oxyCODONE-acetaminophen 7.5-325 mg per tablet 1 tablet QID    pantoprazole injection 40 mg Daily    promethazine tablet 25 mg Q6H PRN    propofol (DIPRIVAN) 10 mg/mL infusion Continuous     QUEtiapine tablet 100 mg BID    sodium chloride 0.9% flush 10 mL PRN    sodium chloride 0.9% flush 10 mL PRN    tacrolimus capsule 0.5 mg BID       Objective:     Vital Signs (Most Recent):  Temp: 98.2 °F (36.8 °C) (09/08/20 1100)  Pulse: (!) 48 (09/08/20 1400)  Resp: 14 (09/08/20 1400)  BP: (!) 82/53 (09/08/20 1400)  SpO2: 100 % (09/08/20 1400)  O2 Device (Oxygen Therapy): ventilator (09/08/20 1400) Vital Signs (24h Range):  Temp:  [97.5 °F (36.4 °C)-98.9 °F (37.2 °C)] 98.2 °F (36.8 °C)  Pulse:  [48-87] 48  Resp:  [12-54] 14  SpO2:  [91 %-100 %] 100 %  BP: ()/(45-73) 82/53  Arterial Line BP: ()/(42-92) 98/48     Weight: 58 kg (127 lb 13.9 oz) (09/06/20 0500)  Body mass index is 25.83 kg/m².  Body surface area is 1.55 meters squared.    I/O last 3 completed shifts:  In: 2018.7 [I.V.:2018.7]  Out: 924 [Urine:699; Drains:225]    Physical Exam  Constitutional:       Interventions: She is intubated.      Comments: Intubated and sedated   HENT:      Head: Normocephalic and atraumatic.   Neck:      Musculoskeletal: Normal range of motion and neck supple. No neck rigidity.   Cardiovascular:      Rate and Rhythm: Normal rate and regular rhythm.      Heart sounds: No murmur. No friction rub. No gallop.    Pulmonary:      Effort: She is intubated.   Abdominal:      General: Abdomen is flat. There is no distension.      Palpations: Abdomen is soft.      Tenderness: There is no abdominal tenderness.   Musculoskeletal: Normal range of motion.      Right lower leg: Edema present.      Left lower leg: Edema present.   Skin:     General: Skin is warm and dry.         Significant Labs:  CBC:   Recent Labs   Lab 09/08/20  0319   WBC 9.07   RBC 3.64*   HGB 10.5*   HCT 32.1*   PLT 70*   MCV 88   MCH 28.8   MCHC 32.7     CMP:   Recent Labs   Lab 09/08/20  0319 09/08/20  1201   *  --   --    CALCIUM 7.6*  --   --    ALBUMIN  --    < > 2.3*   PROT  --    < > 4.8*     --   --    K 4.2  --   --    CO2 16*  --    --      --   --    BUN 57*  --   --    CREATININE 3.0*  --   --    ALKPHOS  --    < > 206*   ALT  --    < > 139*   AST  --    < > 475*   BILITOT  --    < > 1.8*    < > = values in this interval not displayed.     Coagulation:   Recent Labs   Lab 09/08/20  0319 09/08/20  1201   INR 1.0  --    APTT 44.7* 48.0*     All labs within the past 24 hours have been reviewed.     Significant Imaging:  Labs: Reviewed

## 2020-09-08 NOTE — PROGRESS NOTES
[facility]  Critical Care - Surgery  Progress Note    Patient Name: Elda Hernandez  MRN: [id number]  Admission Date: 9/3/2020  Hospital Length of Stay: 5 days  Code Status: Full Code  Attending Provider: Clark Rodriguez MD  Primary Care Provider: Jordana Woods MD   Principal Problem: Perforated abdominal viscus    Subjective:     Hospital/ICU Course:  9/4: Pt admitted to SICU following ex lap for GI perf. Intubated, sedated. Wound vac in place.  Soon after arrival to unit a mottled appearance to RUE was noted.  Vascular consulted and US revealed R radial artery thrombosis.    9/5: Tachycardic..  R arm appearance greatly improved overnight.    9/6: Patient returned to OR for abdominal washout, closure. R arm with dopplerable signals to ulnar artery and palmar arch.   9/7: Attempted to wean patient off of sedation in an effort to move towards extubation. Patient weaned off of propofol. Precedex started. Patient's heart rate very labile and sensitize to sedation.     Interval History/Significant Events: Patient sedated, intubated. Weaned off of propofol yesterday, started on precedex. Patient currently on 0.8 of precedex. Failed SBT this am due to apnea. BUN and Cr continue to rise, 57 and 3.0 today.     Follow-up For: Procedure(s) (LRB):  LAPAROTOMY, EXPLORATORY with abd closure (N/A)    Post-Operative Day: 2 Days Post-Op    Objective:     Vital Signs (Most Recent):  Temp: 98.4 °F (36.9 °C) (09/08/20 0700)  Pulse: (!) 52 (09/08/20 1000)  Resp: 14 (09/08/20 1000)  BP: (!) 76/45 (09/08/20 1000)  SpO2: 100 % (09/08/20 1000) Vital Signs (24h Range):  Temp:  [97.5 °F (36.4 °C)-98.9 °F (37.2 °C)] 98.4 °F (36.9 °C)  Pulse:  [] 52  Resp:  [12-54] 14  SpO2:  [91 %-100 %] 100 %  BP: ()/(42-85) 76/45  Arterial Line BP: ()/(43-92) 88/46     Weight: 58 kg (127 lb 13.9 oz)  Body mass index is 25.83 kg/m².      Intake/Output Summary (Last 24 hours) at 9/8/2020 1052  Last data filed at  9/8/2020 1000  Gross per 24 hour   Intake 1318.85 ml   Output 667 ml   Net 651.85 ml       Physical Exam  Vitals signs reviewed.   Constitutional:       Comments: Sedated   HENT:      Head: Normocephalic and atraumatic.      Mouth/Throat:      Comments: intubated  Neck:      Comments: RIJ central line  Cardiovascular:      Rate and Rhythm: Normal rate and regular rhythm.      Comments: Rate very sensitize to sedation; excellent dopplerable signals to the R ulnar and R palmar arch  Pulmonary:      Breath sounds: Normal breath sounds.      Comments: Intubated, mechanically ventilated    Vent Mode: A/C, FiO2: 40%, RR: 14 br/min, Vt Set: 330mL, PEEP: 5cmH2O, Mean Airway Pressure: 7.1 cmH2O  Abdominal:      Comments: Midline incision with staples intact. No erythema, drainage from area; G tube in place   Skin:     Comments: Slightly pale, mottled appearance of distal R arm; continues to improve; R arm remains    Vents:  Vent Mode: A/C (09/08/20 0850)  Ventilator Initiated: Yes (09/04/20 1130)  Set Rate: 14 BPM (09/08/20 0850)  Vt Set: 330 mL (09/08/20 0850)  PEEP/CPAP: 5 cmH20 (09/08/20 0850)  Oxygen Concentration (%): 40 (09/08/20 0850)  Peak Airway Pressure: 32 cmH2O (09/08/20 0850)  Plateau Pressure: 14 cmH20 (09/08/20 0850)  Total Ve: 4.52 mL (09/08/20 0850)  F/VT Ratio<105 (RSBI): (!) 46.88 (09/08/20 0850)    Lines/Drains/Airways     Central Venous Catheter Line            Percutaneous Central Line Insertion/Assessment - Triple Lumen  09/04/20 0800 right internal jugular 4 days          Drain                 Gastrostomy/Enterostomy 09/04/20 LUQ decompression 4 days         Urethral Catheter 09/04/20 1300 Non-latex;Straight-tip 18 Fr. 3 days          Airway                 Airway - Non-Surgical 09/04/20 1122 Endotracheal Tube 3 days          Arterial Line            Arterial Line 09/04/20 1819 Left Femoral 3 days          Peripheral Intravenous Line                 Peripheral IV - Single Lumen 09/03/20 1100 22 G  Left;Posterior Hand 4 days                Significant Labs:    CBC/Anemia Profile:  Recent Labs   Lab 09/06/20 2050 09/07/20 0432 09/08/20 0319   WBC 9.54 7.42 9.07   HGB 10.0* 9.7* 10.5*   HCT 29.4* 29.4* 32.1*   PLT 87* 79* 70*   MCV 88 89 88   RDW 15.3* 15.3* 15.6*        Chemistries:  Recent Labs   Lab 09/07/20 0432 09/07/20 1744 09/08/20 0058 09/08/20 0319 09/08/20 0527     --  138  --  140  --    K 3.8  --  3.2*  --  4.2  --      --  106  --  109  --    CO2 17*  --  18*  --  16*  --    BUN 44*  --  50*  --  57*  --    CREATININE 2.7*  --  2.9*  --  3.0*  --    CALCIUM 7.7*  --  7.6*  --  7.6*  --    ALBUMIN 2.9*  2.9*   < > 2.7* 2.6*  --  2.5*   PROT 5.0*  5.0*   < > 5.0* 5.0*  --  4.9*   BILITOT 2.7*  2.7*   < > 2.3* 2.2*  --  2.0*   ALKPHOS 154*  154*   < > 201* 213*  --  215*   *  107*   < > 131* 145*  --  142*   *  305*   < > 479* 570*  --  541*   MG 2.4  --   --   --   --   --    PHOS 5.5*  --   --   --   --   --     < > = values in this interval not displayed.       CMP:   Recent Labs   Lab 09/07/20 0432 09/07/20 1744 09/08/20 0058 09/08/20 0319 09/08/20 0527     --  138  --  140  --    K 3.8  --  3.2*  --  4.2  --      --  106  --  109  --    CO2 17*  --  18*  --  16*  --    *  --  111*  --  111*  --    BUN 44*  --  50*  --  57*  --    CREATININE 2.7*  --  2.9*  --  3.0*  --    CALCIUM 7.7*  --  7.6*  --  7.6*  --    PROT 5.0*  5.0*   < > 5.0* 5.0*  --  4.9*   ALBUMIN 2.9*  2.9*   < > 2.7* 2.6*  --  2.5*   BILITOT 2.7*  2.7*   < > 2.3* 2.2*  --  2.0*   ALKPHOS 154*  154*   < > 201* 213*  --  215*   *  305*   < > 479* 570*  --  541*   *  107*   < > 131* 145*  --  142*   ANIONGAP 14  --  14  --  15  --    EGFRNONAA 19.5*  --  17.9*  --  17.2*  --     < > = values in this interval not displayed.     Lactic Acid:   Recent Labs   Lab 09/06/20  1214   LACTATE 1.2       Significant Imaging:  I have reviewed all pertinent  imaging results/findings within the past 24 hours.    Assessment/Plan:     * Perforated abdominal viscus  Neuro:  -sedation: precedex gtt  -analgesia: fentanyl gtt   - seroquel and percocet scheduled   - ativan and dilaudid PRN to assist with pressor weaning     CV:  - Vascular surgery consulted for R radial artery occlusion; appreciate recs.   - Heparin gtt 10mg/kg/hr   - plan to d/c heparin gtt, will discuss with vascular surgery before doing so  - patient off of pressors  - titrate to MAP>60    Pulm:  - intubated, mechanically ventilated.    - Daily CXR  - patient failed SBT this am due to apnea  - plan for SBT trial this afternoon as sedation is weaned     FEN/GI: Perforated stomach; s/p washout and patch (9/4); abdominal closure (9/6)  - s/p washout and closure on 9/6   - d/c zosyn today (day 5)  - start trickle feeds today  - Pantoprazole 40mg IV daily  - Daily metabolic panel with PRN repletion of electrolytes     Renal: Pt with JACQUE upon arrival; BUN/Cr: 32/2.1 (9/4)  - patient with oliguria, anuria over last 24hrs  - JACQUE worsening; BUN/Cr 57/3.0  - Continue to monitor with daily metabolic labs  - lasix challenge 9/6 with no improvement in UOP  - nephrology on board, appreciating recs     HEME/ID: Pt septic and given several L of fluid upon arrival to SICU  - trend lactate daily  - 9/5: 2 U pRBC given with appropriate response  - Hb stable; 9.7 today  - daily CBC; stable H/H  - s/p Liver transplant in 2002; Daily tacrolimus level with AM labs. Per hepatology, restart tacrolimus 1mg BID  - Consult ID     Endo:  - Hx of hypothyroidism; not taking synthroid at home    Dispo:  - Continue SICU care        Critical care was time spent personally by me on the following activities: development of treatment plan with patient or surrogate and bedside caregivers, discussions with consultants, evaluation of patient's response to treatment, examination of patient, ordering and performing treatments and interventions,  ordering and review of laboratory studies, ordering and review of radiographic studies, pulse oximetry, re-evaluation of patient's condition.  This critical care time did not overlap with that of any other provider or involve time for any procedures.     Jakob Gutierrez MD  Critical Care - Surgery  [facility]

## 2020-09-08 NOTE — CONSULTS
Wound care consult received from nursing for assessment of left heel, back and sides of torso. Patient is intubated and on pressors at this time. Upon assessment to bilateral thighs, back and sides of torso noted intact skin with multiple scattered areas of bruising. Sacrum and bilateral heels intact. Recommend fecal pouch to drainage site on LLQ to collect drainage.     Nursing and MD team notified with recommendations.   Wound care to sign off. Please re-consult for any other concerns [phone]    Sacrum      Left heel

## 2020-09-08 NOTE — SUBJECTIVE & OBJECTIVE
Interval History/Significant Events: Patient sedated, intubated. Weaned off of propofol yesterday, started on precedex. Patient currently on 0.8 of precedex. Failed SBT this am due to apnea. BUN and Cr continue to rise, 57 and 3.0 today.     Follow-up For: Procedure(s) (LRB):  LAPAROTOMY, EXPLORATORY with abd closure (N/A)    Post-Operative Day: 2 Days Post-Op    Objective:     Vital Signs (Most Recent):  Temp: 98.4 °F (36.9 °C) (09/08/20 0700)  Pulse: (!) 52 (09/08/20 1000)  Resp: 14 (09/08/20 1000)  BP: (!) 76/45 (09/08/20 1000)  SpO2: 100 % (09/08/20 1000) Vital Signs (24h Range):  Temp:  [97.5 °F (36.4 °C)-98.9 °F (37.2 °C)] 98.4 °F (36.9 °C)  Pulse:  [] 52  Resp:  [12-54] 14  SpO2:  [91 %-100 %] 100 %  BP: ()/(42-85) 76/45  Arterial Line BP: ()/(43-92) 88/46     Weight: 58 kg (127 lb 13.9 oz)  Body mass index is 25.83 kg/m².      Intake/Output Summary (Last 24 hours) at 9/8/2020 1057  Last data filed at 9/8/2020 1000  Gross per 24 hour   Intake 1318.85 ml   Output 667 ml   Net 651.85 ml       Physical Exam  Vitals signs reviewed.   Constitutional:       Comments: Sedated   HENT:      Head: Normocephalic and atraumatic.      Mouth/Throat:      Comments: intubated  Neck:      Comments: RIJ central line  Cardiovascular:      Rate and Rhythm: Normal rate and regular rhythm.      Comments: Rate very sensitize to sedation; excellent dopplerable signals to the R ulnar and R palmar arch  Pulmonary:      Breath sounds: Normal breath sounds.      Comments: Intubated, mechanically ventilated    Vent Mode: A/C, FiO2: 40%, RR: 14 br/min, Vt Set: 330mL, PEEP: 5cmH2O, Mean Airway Pressure: 7.1 cmH2O  Abdominal:      Comments: Midline incision with staples intact. No erythema, drainage from area; G tube in place   Skin:     Comments: Slightly pale, mottled appearance of distal R arm; continues to improve; R arm remains    Vents:  Vent Mode: A/C (09/08/20 0850)  Ventilator Initiated: Yes (09/04/20 1130)  Set  Rate: 14 BPM (09/08/20 0850)  Vt Set: 330 mL (09/08/20 0850)  PEEP/CPAP: 5 cmH20 (09/08/20 0850)  Oxygen Concentration (%): 40 (09/08/20 0850)  Peak Airway Pressure: 32 cmH2O (09/08/20 0850)  Plateau Pressure: 14 cmH20 (09/08/20 0850)  Total Ve: 4.52 mL (09/08/20 0850)  F/VT Ratio<105 (RSBI): (!) 46.88 (09/08/20 0850)    Lines/Drains/Airways     Central Venous Catheter Line            Percutaneous Central Line Insertion/Assessment - Triple Lumen  09/04/20 0800 right internal jugular 4 days          Drain                 Gastrostomy/Enterostomy 09/04/20 LUQ decompression 4 days         Urethral Catheter 09/04/20 1300 Non-latex;Straight-tip 18 Fr. 3 days          Airway                 Airway - Non-Surgical 09/04/20 1122 Endotracheal Tube 3 days          Arterial Line            Arterial Line 09/04/20 1819 Left Femoral 3 days          Peripheral Intravenous Line                 Peripheral IV - Single Lumen 09/03/20 1100 22 G Left;Posterior Hand 4 days                Significant Labs:    CBC/Anemia Profile:  Recent Labs   Lab 09/06/20 2050 09/07/20  0432 09/08/20  0319   WBC 9.54 7.42 9.07   HGB 10.0* 9.7* 10.5*   HCT 29.4* 29.4* 32.1*   PLT 87* 79* 70*   MCV 88 89 88   RDW 15.3* 15.3* 15.6*        Chemistries:  Recent Labs   Lab 09/07/20  0432  09/07/20  1744 09/08/20  0058 09/08/20  0319 09/08/20  0527     --  138  --  140  --    K 3.8  --  3.2*  --  4.2  --      --  106  --  109  --    CO2 17*  --  18*  --  16*  --    BUN 44*  --  50*  --  57*  --    CREATININE 2.7*  --  2.9*  --  3.0*  --    CALCIUM 7.7*  --  7.6*  --  7.6*  --    ALBUMIN 2.9*  2.9*   < > 2.7* 2.6*  --  2.5*   PROT 5.0*  5.0*   < > 5.0* 5.0*  --  4.9*   BILITOT 2.7*  2.7*   < > 2.3* 2.2*  --  2.0*   ALKPHOS 154*  154*   < > 201* 213*  --  215*   *  107*   < > 131* 145*  --  142*   *  305*   < > 479* 570*  --  541*   MG 2.4  --   --   --   --   --    PHOS 5.5*  --   --   --   --   --     < > = values in this interval  not displayed.       CMP:   Recent Labs   Lab 09/07/20  0432  09/07/20  1744 09/08/20  0058 09/08/20  0319 09/08/20  0527     --  138  --  140  --    K 3.8  --  3.2*  --  4.2  --      --  106  --  109  --    CO2 17*  --  18*  --  16*  --    *  --  111*  --  111*  --    BUN 44*  --  50*  --  57*  --    CREATININE 2.7*  --  2.9*  --  3.0*  --    CALCIUM 7.7*  --  7.6*  --  7.6*  --    PROT 5.0*  5.0*   < > 5.0* 5.0*  --  4.9*   ALBUMIN 2.9*  2.9*   < > 2.7* 2.6*  --  2.5*   BILITOT 2.7*  2.7*   < > 2.3* 2.2*  --  2.0*   ALKPHOS 154*  154*   < > 201* 213*  --  215*   *  305*   < > 479* 570*  --  541*   *  107*   < > 131* 145*  --  142*   ANIONGAP 14  --  14  --  15  --    EGFRNONAA 19.5*  --  17.9*  --  17.2*  --     < > = values in this interval not displayed.     Lactic Acid:   Recent Labs   Lab 09/06/20  1214   LACTATE 1.2       Significant Imaging:  I have reviewed all pertinent imaging results/findings within the past 24 hours.

## 2020-09-08 NOTE — SUBJECTIVE & OBJECTIVE
Interval History:   NAEON  Patient moving all extremities but not following commands  LFTs continue to be elevated    Medications:  Continuous Infusions:   dexmedetomidine (PRECEDEX) infusion 0.8 mcg/kg/hr (09/08/20 0700)    fentanyl 7.5 mL/hr at 09/08/20 0700    heparin (porcine) in D5W 10 Units/kg/hr (09/08/20 0700)    norepinephrine bitartrate-D5W Stopped (09/07/20 1731)    propofoL Stopped (09/07/20 2100)    vasopressin (PITRESSIN) infusion Stopped (09/07/20 0956)     Scheduled Meds:   hydrocortisone sodium succinate  100 mg Intravenous Q8H    nitroGLYCERIN 2% TD oint  0.5 inch Topical (Top) Q6H    oxyCODONE-acetaminophen  1 tablet Oral QID    pantoprazole  40 mg Intravenous Daily    piperacillin-tazobactam (ZOSYN) IVPB  4.5 g Intravenous Q8H    QUEtiapine  100 mg Oral BID    tacrolimus  0.5 mg Sublingual BID     PRN Meds:calcium gluconate IVPB, calcium gluconate IVPB, calcium gluconate IVPB, dextrose 50%, dextrose 50%, fentaNYL, glucagon (human recombinant), glucose, glucose, heparin (PORCINE), heparin (PORCINE), HYDROmorphone, lorazepam, magnesium sulfate IVPB, magnesium sulfate IVPB, melatonin, ondansetron, oxyCODONE, potassium chloride in water **AND** potassium chloride in water **AND** potassium chloride in water, promethazine, sodium chloride 0.9%, sodium chloride 0.9%, sodium phosphate IVPB, sodium phosphate IVPB, sodium phosphate IVPB     Review of patient's allergies indicates:   Allergen Reactions    Codeine Itching     Other reaction(s): Itching    Lipitor [atorvastatin] Other (See Comments)     Other reaction(s): Muscle pain  Muscle cranmps    Morphine Itching     Other reaction(s): nausea and vomiting     Zoloft [sertraline] Other (See Comments)     Tremors/muscle spasms     Objective:     Vital Signs (Most Recent):  Temp: 98.4 °F (36.9 °C) (09/08/20 0700)  Pulse: (!) 51 (09/08/20 0730)  Resp: 12 (09/08/20 0730)  BP: (!) 89/54 (09/08/20 0700)  SpO2: 100 % (09/08/20 0730) Vital  Signs (24h Range):  Temp:  [97.5 °F (36.4 °C)-98.9 °F (37.2 °C)] 98.4 °F (36.9 °C)  Pulse:  [] 51  Resp:  [12-54] 12  SpO2:  [65 %-100 %] 100 %  BP: ()/(40-85) 89/54  Arterial Line BP: ()/(35-92) 109/49     Weight: 58 kg (127 lb 13.9 oz)  Body mass index is 25.83 kg/m².    Intake/Output - Last 3 Shifts       09/06 0700 - 09/07 0659 09/07 0700 - 09/08 0659 09/08 0700 - 09/09 0659    I.V. (mL/kg) 2962.9 (51.1) 1258.9 (21.7)     Blood       Total Intake(mL/kg) 2962.9 (51.1) 1258.9 (21.7)     Urine (mL/kg/hr) 596 (0.4) 502 (0.4) 20 (0.5)    Drains 149 225     Other 160      Total Output 905 727 20    Net +2057.9 +531.9 -20                 Physical Exam  Constitutional:       General: She is not in acute distress.  Cardiovascular:      Rate and Rhythm: Normal rate.   Pulmonary:      Comments: Vent Mode: A/C  Oxygen Concentration (%):  (40-50) 40  Resp Rate Total:  (14 br/min-23 br/min) 14 br/min  Vt Set:  (330 mL) 330 mL  PEEP/CPAP:  (5 cmH20) 5 cmH20  Mean Airway Pressure:  (6.1 cmH20-9.2 cmH20) 7.1 cmH20    Abdominal:      General: There is no distension.      Palpations: Abdomen is soft.      Tenderness: There is no abdominal tenderness.      Comments: G tube    Skin:     General: Skin is warm and dry.         Significant Labs:  CBC:   Recent Labs   Lab 09/08/20 0319   WBC 9.07   RBC 3.64*   HGB 10.5*   HCT 32.1*   PLT 70*   MCV 88   MCH 28.8   MCHC 32.7     CMP:   Recent Labs   Lab 09/08/20 0319 09/08/20  0527   *  --    CALCIUM 7.6*  --    ALBUMIN  --  2.5*   PROT  --  4.9*     --    K 4.2  --    CO2 16*  --      --    BUN 57*  --    CREATININE 3.0*  --    ALKPHOS  --  215*   ALT  --  142*   AST  --  541*   BILITOT  --  2.0*       Significant Diagnostics:  I have reviewed all pertinent imaging results/findings within the past 24 hours.

## 2020-09-09 LAB
ALBUMIN SERPL BCP-MCNC: 2.2 G/DL (ref 3.5–5.2)
ALBUMIN SERPL BCP-MCNC: 2.4 G/DL (ref 3.5–5.2)
ALBUMIN SERPL BCP-MCNC: 2.5 G/DL (ref 3.5–5.2)
ALBUMIN SERPL BCP-MCNC: 2.5 G/DL (ref 3.5–5.2)
ALLENS TEST: ABNORMAL
ALP SERPL-CCNC: 209 U/L (ref 55–135)
ALP SERPL-CCNC: 228 U/L (ref 55–135)
ALP SERPL-CCNC: 251 U/L (ref 55–135)
ALP SERPL-CCNC: 259 U/L (ref 55–135)
ALT SERPL W/O P-5'-P-CCNC: 143 U/L (ref 10–44)
ALT SERPL W/O P-5'-P-CCNC: 162 U/L (ref 10–44)
ALT SERPL W/O P-5'-P-CCNC: 182 U/L (ref 10–44)
ALT SERPL W/O P-5'-P-CCNC: 184 U/L (ref 10–44)
AMMONIA PLAS-SCNC: 61 UMOL/L (ref 10–50)
ANION GAP SERPL CALC-SCNC: 17 MMOL/L (ref 8–16)
ANISOCYTOSIS BLD QL SMEAR: SLIGHT
ANISOCYTOSIS BLD QL SMEAR: SLIGHT
APTT BLDCRRT: 47.8 SEC (ref 21–32)
APTT BLDCRRT: 60.8 SEC (ref 21–32)
AST SERPL-CCNC: 469 U/L (ref 10–40)
AST SERPL-CCNC: 536 U/L (ref 10–40)
AST SERPL-CCNC: 581 U/L (ref 10–40)
AST SERPL-CCNC: 628 U/L (ref 10–40)
BACTERIA BLD CULT: NORMAL
BACTERIA BLD CULT: NORMAL
BASOPHILS # BLD AUTO: ABNORMAL K/UL (ref 0–0.2)
BASOPHILS # BLD AUTO: ABNORMAL K/UL (ref 0–0.2)
BASOPHILS NFR BLD: 0 % (ref 0–1.9)
BASOPHILS NFR BLD: 0 % (ref 0–1.9)
BILIRUB DIRECT SERPL-MCNC: 1.5 MG/DL (ref 0.1–0.3)
BILIRUB DIRECT SERPL-MCNC: 1.7 MG/DL (ref 0.1–0.3)
BILIRUB DIRECT SERPL-MCNC: 1.7 MG/DL (ref 0.1–0.3)
BILIRUB DIRECT SERPL-MCNC: 1.8 MG/DL (ref 0.1–0.3)
BILIRUB SERPL-MCNC: 1.8 MG/DL (ref 0.1–1)
BILIRUB SERPL-MCNC: 1.9 MG/DL (ref 0.1–1)
BILIRUB SERPL-MCNC: 2.2 MG/DL (ref 0.1–1)
BILIRUB SERPL-MCNC: 2.2 MG/DL (ref 0.1–1)
BUN SERPL-MCNC: 71 MG/DL (ref 6–20)
CALCIUM SERPL-MCNC: 7.9 MG/DL (ref 8.7–10.5)
CHLORIDE SERPL-SCNC: 110 MMOL/L (ref 95–110)
CO2 SERPL-SCNC: 15 MMOL/L (ref 23–29)
CREAT SERPL-MCNC: 3.2 MG/DL (ref 0.5–1.4)
DELSYS: ABNORMAL
DIFFERENTIAL METHOD: ABNORMAL
DIFFERENTIAL METHOD: ABNORMAL
EOSINOPHIL # BLD AUTO: ABNORMAL K/UL (ref 0–0.5)
EOSINOPHIL # BLD AUTO: ABNORMAL K/UL (ref 0–0.5)
EOSINOPHIL NFR BLD: 0 % (ref 0–8)
EOSINOPHIL NFR BLD: 1 % (ref 0–8)
ERYTHROCYTE [DISTWIDTH] IN BLOOD BY AUTOMATED COUNT: 15.7 % (ref 11.5–14.5)
ERYTHROCYTE [DISTWIDTH] IN BLOOD BY AUTOMATED COUNT: 15.9 % (ref 11.5–14.5)
EST. GFR  (AFRICAN AMERICAN): 18.3 ML/MIN/1.73 M^2
EST. GFR  (NON AFRICAN AMERICAN): 15.9 ML/MIN/1.73 M^2
GLUCOSE SERPL-MCNC: 124 MG/DL (ref 70–110)
HCO3 UR-SCNC: 16.2 MMOL/L (ref 24–28)
HCT VFR BLD AUTO: 31.7 % (ref 37–48.5)
HCT VFR BLD AUTO: 35.4 % (ref 37–48.5)
HGB BLD-MCNC: 10.5 G/DL (ref 12–16)
HGB BLD-MCNC: 11.5 G/DL (ref 12–16)
HYPOCHROMIA BLD QL SMEAR: ABNORMAL
IMM GRANULOCYTES # BLD AUTO: ABNORMAL K/UL (ref 0–0.04)
IMM GRANULOCYTES # BLD AUTO: ABNORMAL K/UL (ref 0–0.04)
IMM GRANULOCYTES NFR BLD AUTO: ABNORMAL % (ref 0–0.5)
IMM GRANULOCYTES NFR BLD AUTO: ABNORMAL % (ref 0–0.5)
INR PPP: 1.1 (ref 0.8–1.2)
LYMPHOCYTES # BLD AUTO: ABNORMAL K/UL (ref 1–4.8)
LYMPHOCYTES # BLD AUTO: ABNORMAL K/UL (ref 1–4.8)
LYMPHOCYTES NFR BLD: 1 % (ref 18–48)
LYMPHOCYTES NFR BLD: 5 % (ref 18–48)
MCH RBC QN AUTO: 28.5 PG (ref 27–31)
MCH RBC QN AUTO: 28.5 PG (ref 27–31)
MCHC RBC AUTO-ENTMCNC: 32.5 G/DL (ref 32–36)
MCHC RBC AUTO-ENTMCNC: 33.1 G/DL (ref 32–36)
MCV RBC AUTO: 86 FL (ref 82–98)
MCV RBC AUTO: 88 FL (ref 82–98)
MONOCYTES # BLD AUTO: ABNORMAL K/UL (ref 0.3–1)
MONOCYTES # BLD AUTO: ABNORMAL K/UL (ref 0.3–1)
MONOCYTES NFR BLD: 12 % (ref 4–15)
MONOCYTES NFR BLD: 9 % (ref 4–15)
MYELOCYTES NFR BLD MANUAL: 2 %
NEUTROPHILS NFR BLD: 81 % (ref 38–73)
NEUTROPHILS NFR BLD: 89 % (ref 38–73)
NRBC BLD-RTO: 0 /100 WBC
NRBC BLD-RTO: 0 /100 WBC
PCO2 BLDA: 24.1 MMHG (ref 35–45)
PH SMN: 7.44 [PH] (ref 7.35–7.45)
PLATELET # BLD AUTO: 104 K/UL (ref 150–350)
PLATELET # BLD AUTO: 80 K/UL (ref 150–350)
PLATELET BLD QL SMEAR: ABNORMAL
PLATELET BLD QL SMEAR: ABNORMAL
PMV BLD AUTO: 10.9 FL (ref 9.2–12.9)
PMV BLD AUTO: 11.3 FL (ref 9.2–12.9)
PO2 BLDA: 62 MMHG (ref 80–100)
POC BE: -8 MMOL/L
POC SATURATED O2: 93 % (ref 95–100)
POC TCO2: 17 MMOL/L (ref 23–27)
POCT GLUCOSE: 103 MG/DL (ref 70–110)
POCT GLUCOSE: 109 MG/DL (ref 70–110)
POCT GLUCOSE: 119 MG/DL (ref 70–110)
POCT GLUCOSE: 125 MG/DL (ref 70–110)
POCT GLUCOSE: 142 MG/DL (ref 70–110)
POCT GLUCOSE: 145 MG/DL (ref 70–110)
POIKILOCYTOSIS BLD QL SMEAR: ABNORMAL
POLYCHROMASIA BLD QL SMEAR: ABNORMAL
POLYCHROMASIA BLD QL SMEAR: ABNORMAL
POTASSIUM SERPL-SCNC: 3.7 MMOL/L (ref 3.5–5.1)
PROT SERPL-MCNC: 4.7 G/DL (ref 6–8.4)
PROT SERPL-MCNC: 5.1 G/DL (ref 6–8.4)
PROT SERPL-MCNC: 5.2 G/DL (ref 6–8.4)
PROT SERPL-MCNC: 5.2 G/DL (ref 6–8.4)
PROTHROMBIN TIME: 12 SEC (ref 9–12.5)
RBC # BLD AUTO: 3.69 M/UL (ref 4–5.4)
RBC # BLD AUTO: 4.04 M/UL (ref 4–5.4)
SAMPLE: ABNORMAL
SITE: ABNORMAL
SODIUM SERPL-SCNC: 142 MMOL/L (ref 136–145)
TACROLIMUS BLD-MCNC: 6.1 NG/ML (ref 5–15)
WBC # BLD AUTO: 12.63 K/UL (ref 3.9–12.7)
WBC # BLD AUTO: 16.2 K/UL (ref 3.9–12.7)

## 2020-09-09 PROCEDURE — 99231 PR SUBSEQUENT HOSPITAL CARE,LEVL I: ICD-10-PCS | Mod: ,,, | Performed by: INTERNAL MEDICINE

## 2020-09-09 PROCEDURE — 63600175 PHARM REV CODE 636 W HCPCS: Performed by: STUDENT IN AN ORGANIZED HEALTH CARE EDUCATION/TRAINING PROGRAM

## 2020-09-09 PROCEDURE — 99900035 HC TECH TIME PER 15 MIN (STAT)

## 2020-09-09 PROCEDURE — 37799 UNLISTED PX VASCULAR SURGERY: CPT

## 2020-09-09 PROCEDURE — 82140 ASSAY OF AMMONIA: CPT

## 2020-09-09 PROCEDURE — 20000000 HC ICU ROOM

## 2020-09-09 PROCEDURE — 63600175 PHARM REV CODE 636 W HCPCS: Performed by: SURGERY

## 2020-09-09 PROCEDURE — 85730 THROMBOPLASTIN TIME PARTIAL: CPT

## 2020-09-09 PROCEDURE — 82803 BLOOD GASES ANY COMBINATION: CPT

## 2020-09-09 PROCEDURE — 25000003 PHARM REV CODE 250: Performed by: STUDENT IN AN ORGANIZED HEALTH CARE EDUCATION/TRAINING PROGRAM

## 2020-09-09 PROCEDURE — 85610 PROTHROMBIN TIME: CPT

## 2020-09-09 PROCEDURE — 80197 ASSAY OF TACROLIMUS: CPT

## 2020-09-09 PROCEDURE — 85007 BL SMEAR W/DIFF WBC COUNT: CPT

## 2020-09-09 PROCEDURE — 99291 CRITICAL CARE FIRST HOUR: CPT | Mod: 24,,, | Performed by: SURGERY

## 2020-09-09 PROCEDURE — 80048 BASIC METABOLIC PNL TOTAL CA: CPT

## 2020-09-09 PROCEDURE — 99291 PR CRITICAL CARE, E/M 30-74 MINUTES: ICD-10-PCS | Mod: 24,,, | Performed by: SURGERY

## 2020-09-09 PROCEDURE — 25000003 PHARM REV CODE 250: Performed by: SURGERY

## 2020-09-09 PROCEDURE — 80076 HEPATIC FUNCTION PANEL: CPT | Mod: 91

## 2020-09-09 PROCEDURE — C9113 INJ PANTOPRAZOLE SODIUM, VIA: HCPCS | Performed by: STUDENT IN AN ORGANIZED HEALTH CARE EDUCATION/TRAINING PROGRAM

## 2020-09-09 PROCEDURE — 99231 SBSQ HOSP IP/OBS SF/LOW 25: CPT | Mod: ,,, | Performed by: INTERNAL MEDICINE

## 2020-09-09 PROCEDURE — 85730 THROMBOPLASTIN TIME PARTIAL: CPT | Mod: 91

## 2020-09-09 PROCEDURE — 94761 N-INVAS EAR/PLS OXIMETRY MLT: CPT

## 2020-09-09 PROCEDURE — 85027 COMPLETE CBC AUTOMATED: CPT

## 2020-09-09 PROCEDURE — 99232 SBSQ HOSP IP/OBS MODERATE 35: CPT | Mod: ,,, | Performed by: INTERNAL MEDICINE

## 2020-09-09 PROCEDURE — 99232 PR SUBSEQUENT HOSPITAL CARE,LEVL II: ICD-10-PCS | Mod: ,,, | Performed by: INTERNAL MEDICINE

## 2020-09-09 RX ORDER — LACTULOSE 10 G/15ML
20 SOLUTION ORAL 3 TIMES DAILY
Status: DISCONTINUED | OUTPATIENT
Start: 2020-09-09 | End: 2020-09-11

## 2020-09-09 RX ORDER — QUETIAPINE FUMARATE 25 MG/1
50 TABLET, FILM COATED ORAL 2 TIMES DAILY
Status: DISCONTINUED | OUTPATIENT
Start: 2020-09-09 | End: 2020-09-10

## 2020-09-09 RX ORDER — OXYCODONE AND ACETAMINOPHEN 7.5; 325 MG/1; MG/1
1 TABLET ORAL EVERY 6 HOURS PRN
Status: DISCONTINUED | OUTPATIENT
Start: 2020-09-09 | End: 2020-09-14

## 2020-09-09 RX ORDER — LABETALOL HCL 20 MG/4 ML
10 SYRINGE (ML) INTRAVENOUS EVERY 6 HOURS PRN
Status: DISCONTINUED | OUTPATIENT
Start: 2020-09-09 | End: 2020-09-28

## 2020-09-09 RX ORDER — ENOXAPARIN SODIUM 100 MG/ML
30 INJECTION SUBCUTANEOUS EVERY 24 HOURS
Status: DISCONTINUED | OUTPATIENT
Start: 2020-09-09 | End: 2020-09-10

## 2020-09-09 RX ORDER — DEXMEDETOMIDINE HYDROCHLORIDE 4 UG/ML
0.2 INJECTION, SOLUTION INTRAVENOUS CONTINUOUS
Status: DISCONTINUED | OUTPATIENT
Start: 2020-09-09 | End: 2020-09-11

## 2020-09-09 RX ORDER — LEVOTHYROXINE SODIUM 75 UG/1
75 TABLET ORAL DAILY
Status: DISCONTINUED | OUTPATIENT
Start: 2020-09-09 | End: 2020-09-12

## 2020-09-09 RX ORDER — FUROSEMIDE 10 MG/ML
80 INJECTION INTRAMUSCULAR; INTRAVENOUS 3 TIMES DAILY
Status: DISCONTINUED | OUTPATIENT
Start: 2020-09-09 | End: 2020-09-09

## 2020-09-09 RX ORDER — LACTULOSE 10 G/15ML
20 SOLUTION ORAL 3 TIMES DAILY
Status: DISCONTINUED | OUTPATIENT
Start: 2020-09-09 | End: 2020-09-09

## 2020-09-09 RX ORDER — HYDRALAZINE HYDROCHLORIDE 20 MG/ML
10 INJECTION INTRAMUSCULAR; INTRAVENOUS EVERY 8 HOURS PRN
Status: DISCONTINUED | OUTPATIENT
Start: 2020-09-09 | End: 2020-09-23

## 2020-09-09 RX ADMIN — FUROSEMIDE 80 MG: 10 INJECTION, SOLUTION INTRAMUSCULAR; INTRAVENOUS at 02:09

## 2020-09-09 RX ADMIN — TACROLIMUS 1 MG: 1 CAPSULE, GELATIN COATED ORAL at 05:09

## 2020-09-09 RX ADMIN — Medication 10 MG: at 01:09

## 2020-09-09 RX ADMIN — QUETIAPINE FUMARATE 50 MG: 25 TABLET ORAL at 08:09

## 2020-09-09 RX ADMIN — FUROSEMIDE 80 MG: 10 INJECTION, SOLUTION INTRAMUSCULAR; INTRAVENOUS at 09:09

## 2020-09-09 RX ADMIN — QUETIAPINE FUMARATE 50 MG: 25 TABLET ORAL at 09:09

## 2020-09-09 RX ADMIN — Medication 10 MG: at 04:09

## 2020-09-09 RX ADMIN — PANTOPRAZOLE SODIUM 40 MG: 40 INJECTION, POWDER, LYOPHILIZED, FOR SOLUTION INTRAVENOUS at 09:09

## 2020-09-09 RX ADMIN — NITROGLYCERIN 0.5 INCH: 20 OINTMENT TOPICAL at 12:09

## 2020-09-09 RX ADMIN — DEXMEDETOMIDINE HYDROCHLORIDE 0.2 MCG/KG/HR: 4 INJECTION, SOLUTION INTRAVENOUS at 11:09

## 2020-09-09 RX ADMIN — HYDRALAZINE HYDROCHLORIDE 10 MG: 20 INJECTION INTRAMUSCULAR; INTRAVENOUS at 07:09

## 2020-09-09 RX ADMIN — HYDROCORTISONE SODIUM SUCCINATE 100 MG: 100 INJECTION, POWDER, FOR SOLUTION INTRAMUSCULAR; INTRAVENOUS at 05:09

## 2020-09-09 RX ADMIN — ENOXAPARIN SODIUM 30 MG: 30 INJECTION SUBCUTANEOUS at 05:09

## 2020-09-09 RX ADMIN — AMLODIPINE 5 MG: 1 SUSPENSION ORAL at 10:09

## 2020-09-09 RX ADMIN — LACTULOSE 20 G: 20 SOLUTION ORAL at 11:09

## 2020-09-09 RX ADMIN — LEVOTHYROXINE SODIUM 75 MCG: 75 TABLET ORAL at 10:09

## 2020-09-09 RX ADMIN — LACTULOSE 20 G: 20 SOLUTION ORAL at 02:09

## 2020-09-09 RX ADMIN — NITROGLYCERIN 0.5 INCH: 20 OINTMENT TOPICAL at 05:09

## 2020-09-09 RX ADMIN — TACROLIMUS 0.5 MG: 0.5 CAPSULE ORAL at 07:09

## 2020-09-09 RX ADMIN — Medication 10 MG: at 07:09

## 2020-09-09 RX ADMIN — LACTULOSE 20 G: 20 SOLUTION ORAL at 08:09

## 2020-09-09 NOTE — SUBJECTIVE & OBJECTIVE
Interval History:   Extubated yesterday.  Not doing much but crying when spoken to     Medications:  Continuous Infusions:   heparin (porcine) in D5W 12 Units/kg/hr (09/09/20 0500)    norepinephrine bitartrate-D5W Stopped (09/09/20 0200)     Scheduled Meds:   furosemide (LASIX) IV  80 mg Intravenous TID    hydrocortisone sodium succinate  100 mg Intravenous Q8H    nitroGLYCERIN 2% TD oint  0.5 inch Topical (Top) Q6H    oxyCODONE-acetaminophen  1 tablet Oral QID    pantoprazole  40 mg Intravenous Daily    QUEtiapine  100 mg Oral BID    tacrolimus  0.5 mg Sublingual BID     PRN Meds:dextrose 50%, dextrose 50%, glucagon (human recombinant), glucose, glucose, heparin (PORCINE), heparin (PORCINE), labetaloL, melatonin, ondansetron, oxyCODONE, promethazine, sodium chloride 0.9%, sodium chloride 0.9%     Review of patient's allergies indicates:   Allergen Reactions    Codeine Itching     Other reaction(s): Itching    Lipitor [atorvastatin] Other (See Comments)     Other reaction(s): Muscle pain  Muscle cranmps    Morphine Itching     Other reaction(s): nausea and vomiting     Zoloft [sertraline] Other (See Comments)     Tremors/muscle spasms     Objective:     Vital Signs (Most Recent):  Temp: 97.6 °F (36.4 °C) (09/09/20 0300)  Pulse: 79 (09/09/20 0530)  Resp: 18 (09/09/20 0530)  BP: (!) 144/63 (09/09/20 0500)  SpO2: 96 % (09/09/20 0530) Vital Signs (24h Range):  Temp:  [97.6 °F (36.4 °C)-98.4 °F (36.9 °C)] 97.6 °F (36.4 °C)  Pulse:  [] 79  Resp:  [7-38] 18  SpO2:  [91 %-100 %] 96 %  BP: ()/(38-78) 144/63  Arterial Line BP: ()/(40-78) 163/70     Weight: 58 kg (127 lb 13.9 oz)  Body mass index is 25.83 kg/m².    Intake/Output - Last 3 Shifts       09/07 0700 - 09/08 0659 09/08 0700 - 09/09 0659    I.V. (mL/kg) 1258.9 (21.7) 1018.5 (17.6)    NG/GT  250    Total Intake(mL/kg) 1258.9 (21.7) 1268.5 (21.9)    Urine (mL/kg/hr) 502 (0.4) 480 (0.3)    Drains 225     Total Output 727 480    Net +531.9  +788.5                Physical Exam  Constitutional:       General: She is not in acute distress.  Cardiovascular:      Rate and Rhythm: Normal rate.   Abdominal:      General: There is no distension.      Palpations: Abdomen is soft.      Tenderness: There is no abdominal tenderness.      Comments: G tube    Skin:     General: Skin is warm and dry.         Significant Labs:  CBC:   Recent Labs   Lab 09/09/20 0321   WBC 12.63   RBC 4.04   HGB 11.5*   HCT 35.4*   PLT 80*   MCV 88   MCH 28.5   MCHC 32.5     CMP:   Recent Labs   Lab 09/09/20 0321 09/09/20  0536   *  --    CALCIUM 7.9*  --    ALBUMIN  --  2.4*   PROT  --  5.1*     --    K 3.7  --    CO2 15*  --      --    BUN 71*  --    CREATININE 3.2*  --    ALKPHOS  --  228*   ALT  --  162*   AST  --  536*   BILITOT  --  1.9*       Significant Diagnostics:  I have reviewed all pertinent imaging results/findings within the past 24 hours.

## 2020-09-09 NOTE — NURSING
"Pt became very agitated about 2145, attempting to climb out of bed and yelling "help me." Unable to redirect, would not make eye contact or answer questions. BP 170s systolic. Charge RN at bedside to assist, pt given PRN dose of IV Ativan. Pt calmed down and became sedated.    BP began to drop around 2230 with MAPs in the mid-50's. MD notified, ordered 500 mL LR bolus.Pressures remained low, so MD instructed to restart levo. Titrated up to .08 mcg/kg/min at the highest to maintain MAPs >60 per MD. Applied 4L NC O2 to help blow off CO2 from sedation.     Pt stable, current vitals: HR 68, /46 (68), RR 12, SpO2 99%. Levo at .04. Plan to trend vitals and titrate off levo gtt as tolerated. All sedating medications D/C'd.   "

## 2020-09-09 NOTE — SUBJECTIVE & OBJECTIVE
Interval History/Significant Events: NAEO. Patient extubated yesterday and currently SHANICE. Patient remains confused/encephalopathic.     Follow-up For: Procedure(s) (LRB):  LAPAROTOMY, EXPLORATORY with abd closure (N/A)    Post-Operative Day: 3 Days Post-Op    Objective:     Vital Signs (Most Recent):  Temp: 97.6 °F (36.4 °C) (09/09/20 0300)  Pulse: 79 (09/09/20 0530)  Resp: 18 (09/09/20 0530)  BP: (!) 144/63 (09/09/20 0500)  SpO2: 96 % (09/09/20 0530) Vital Signs (24h Range):  Temp:  [97.6 °F (36.4 °C)-98.4 °F (36.9 °C)] 97.6 °F (36.4 °C)  Pulse:  [] 79  Resp:  [7-38] 18  SpO2:  [91 %-100 %] 96 %  BP: ()/(38-78) 144/63  Arterial Line BP: ()/(40-78) 163/70     Weight: 58 kg (127 lb 13.9 oz)  Body mass index is 25.83 kg/m².      Intake/Output Summary (Last 24 hours) at 9/9/2020 0608  Last data filed at 9/9/2020 0600  Gross per 24 hour   Intake 1268.5 ml   Output 450 ml   Net 818.5 ml       Physical Exam  Vitals signs reviewed.   Constitutional:       Appearance: She is normal weight.   HENT:      Head: Normocephalic and atraumatic.      Nose: Nose normal.      Mouth/Throat:      Mouth: Mucous membranes are moist.      Comments: intubated  Eyes:      Extraocular Movements: Extraocular movements intact.   Neck:      Musculoskeletal: Normal range of motion.      Comments: RIJ central line  Cardiovascular:      Rate and Rhythm: Normal rate and regular rhythm.   Pulmonary:      Effort: Pulmonary effort is normal.      Breath sounds: Normal breath sounds.   Abdominal:      Comments: Midline incision with dressing in place; dressing dry, intact with old blood stain; G tube in place   Musculoskeletal: Normal range of motion.   Skin:     General: Skin is warm.      Comments: Slightly pale, mottled appearance of distal R arm; continues to improve; R arm remains warm   Neurological:      General: No focal deficit present.      Mental Status: She is alert.      Comments: AAOx2          Vents:      Lines/Drains/Airways     Central Venous Catheter Line            Percutaneous Central Line Insertion/Assessment - Triple Lumen  09/04/20 0800 right internal jugular 4 days          Drain                 Gastrostomy/Enterostomy 09/04/20 LUQ decompression 5 days         Urethral Catheter 09/04/20 1300 Non-latex;Straight-tip 18 Fr. 4 days          Airway                 Airway - Non-Surgical 09/04/20 1122 Endotracheal Tube 4 days          Arterial Line            Arterial Line 09/04/20 1819 Left Femoral 4 days          Peripheral Intravenous Line                 Peripheral IV - Single Lumen 09/08/20 1525 22 G Left Antecubital less than 1 day                Significant Labs:    CBC/Anemia Profile:  Recent Labs   Lab 09/08/20 0319 09/09/20  0321   WBC 9.07 12.63   HGB 10.5* 11.5*   HCT 32.1* 35.4*   PLT 70* 80*   MCV 88 88   RDW 15.6* 15.9*        Chemistries:  Recent Labs   Lab 09/07/20  1744  09/08/20  0319  09/08/20  1201 09/08/20  1740 09/09/20  0047 09/09/20  0321     --  140  --   --   --   --  142   K 3.2*  --  4.2  --   --   --   --  3.7     --  109  --   --   --   --  110   CO2 18*  --  16*  --   --   --   --  15*   BUN 50*  --  57*  --   --   --   --  71*   CREATININE 2.9*  --  3.0*  --   --   --   --  3.2*   CALCIUM 7.6*  --  7.6*  --   --   --   --  7.9*   ALBUMIN 2.7*   < >  --    < > 2.3* 2.3* 2.2*  --    PROT 5.0*   < >  --    < > 4.8* 4.9* 4.7*  --    BILITOT 2.3*   < >  --    < > 1.8* 1.9* 1.8*  --    ALKPHOS 201*   < >  --    < > 206* 212* 209*  --    *   < >  --    < > 139* 143* 143*  --    *   < >  --    < > 475* 464* 469*  --     < > = values in this interval not displayed.       All pertinent labs within the past 24 hours have been reviewed.    Significant Imaging:  I have reviewed all pertinent imaging results/findings within the past 24 hours.

## 2020-09-09 NOTE — PLAN OF CARE
Dx: Perforated abdominal viscus     Shift Events: See note on ativan/sedation. Levo gtt off at 0200. Has been hypertensive since, labetalol given for SBP >180.      Goals of Care: MAP >60, SBP <180     Neuro: Arouses to Voice and Moves All Extremities. Moans/cries, becomes intermittently agitated/restless, unable to redirect.     Vital Signs: /71, SpO2 96% on RA, afebrile, HR 82 NSR     Respiratory: room air     Diet: NPO, plan to start tube feeds today     Gtts: Heparin at 12 units/kg/hr     Urine Output: Urinary Catheter 5-40 cc/hour     Skin: skin tear/loss on R wrist, covered with foam dressing. Q2 turns, immerse mattress in use and working appropriately
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
## 2020-09-09 NOTE — PROGRESS NOTES
[facility]  Hepatology  Progress Note    Patient Name: Elda Hernandez  MRN: [id number]  Admission Date: 9/3/2020  Hospital Length of Stay: 6 days  Attending Provider: Clark Rodriguez MD   Primary Care Physician: Jordana Woods MD  Principal Problem:Perforated abdominal viscus    Subjective:     Transplant status: Post-transplant    HPI: Ms. Hernandez is a 49yo F w/PMH of von Gierke disease s/p liver transplant (2002, on tacro + MMF, follows Dr. Nielsen), hx chronic rejection (bx 2015 with acute and chronic rejection s/p steroids), biliary stricture and ductopenic rejection, recently with cirrhosis on fibroscan (10/2019), HTN, and depression who presents as a transfer for further evaluation of gastric outlet obstruction and esophageal stricturing.  Hepatology consult for immunosuppression management.    Patient is a poor historian-received fentanyl this morning.  She presented to [facility] with 3 weeks of mid/epigastric abdominal pain with associated nausea and vomiting.  She is on tacrolimus 2mg AM / 1mg PM and cellcept 500mg bid at home for immunosuppression.  CT abdo/pelvis showed gastric distension, gastric outlet obstruction, obstructive-related changes at pyloric-duodenal junction, and thickening of GE junction.  EGD on 9/3 which grade IV esophagitis with stricture, unable to pass gastroscope and no pediatric scope available so transferred here.    Labs initially with hypokalemia and mildly elevated AST with otherwise normal LFTs, lipase, CBC and INR.  COVID negative.  Admitted to hospital medicine here, but overnight became hypotensive and lethargic so transferred to MICU, started on vasopressin. Repeat labs with normal LFTs, JACQUE with Cr 2.2, lactate 3.9, BCx sent.    Interval History:   Extubated yesterday.  Confused, intermittently yelling. Has not restarted lactulose yet.  On tacro 1mg bid.    Current Facility-Administered Medications   Medication    amLODIPine benzoate 1  mg/mL oral suspension 5 mg    dexmedetomidine (PRECEDEX) 400mcg/100mL 0.9% NaCL infusion    dextrose 50% injection 12.5 g    dextrose 50% injection 25 g    enoxaparin injection 30 mg    furosemide injection 80 mg    glucagon (human recombinant) injection 1 mg    glucose chewable tablet 16 g    glucose chewable tablet 24 g    heparin 25,000 units in dextrose 5% (100 units/ml) IV bolus from bag - ADDITIONAL PRN BOLUS - 30 units/kg (max bolus 4000 units)    heparin 25,000 units in dextrose 5% (100 units/ml) IV bolus from bag - ADDITIONAL PRN BOLUS - 60 units/kg (max bolus 4000 units)    hydrALAZINE injection 10 mg    labetalol 20 mg/4 mL (5 mg/mL) IV syring    lactulose 20 gram/30 mL solution Soln 20 g    levothyroxine tablet 75 mcg    melatonin tablet 6 mg    nitroGLYCERIN 2% TD oint ointment 0.5 inch    ondansetron injection 4 mg    oxyCODONE-acetaminophen 7.5-325 mg per tablet 1 tablet    pantoprazole injection 40 mg    promethazine tablet 25 mg    QUEtiapine tablet 50 mg    sodium chloride 0.9% flush 10 mL    sodium chloride 0.9% flush 10 mL    tacrolimus (PROGRAF) 1 mg/mL oral syringe       Objective:     Vital Signs (Most Recent):  Temp: 98.5 °F (36.9 °C) (09/09/20 1100)  Pulse: 93 (09/09/20 1115)  Resp: (!) 36 (09/09/20 1115)  BP: (!) 180/84 (09/09/20 1100)  SpO2: (!) 89 % (09/09/20 1115) Vital Signs (24h Range):  Temp:  [97.6 °F (36.4 °C)-98.5 °F (36.9 °C)] 98.5 °F (36.9 °C)  Pulse:  [] 93  Resp:  [7-48] 36  SpO2:  [88 %-100 %] 89 %  BP: ()/(38-84) 180/84  Arterial Line BP: ()/(40-90) 183/77     Weight: 58 kg (127 lb 13.9 oz) (09/06/20 0500)  Body mass index is 25.83 kg/m².    Physical Exam  Vitals signs and nursing note reviewed.   Constitutional:       Appearance: She is ill-appearing.   Eyes:      General: No scleral icterus.  Cardiovascular:      Rate and Rhythm: Normal rate.   Pulmonary:      Effort: Pulmonary effort is normal.   Abdominal:      General: Bowel  sounds are normal.      Palpations: Abdomen is soft.   Skin:     Coloration: Skin is not jaundiced.   Neurological:      Mental Status: She is lethargic and disoriented.   Psychiatric:         Behavior: Behavior is uncooperative.         MELD-Na score: 21 at 9/9/2020  5:36 AM  MELD score: 21 at 9/9/2020  5:36 AM  Calculated from:  Serum Creatinine: 3.2 mg/dL at 9/9/2020  3:21 AM  Serum Sodium: 142 mmol/L (Rounded to 137 mmol/L) at 9/9/2020  3:21 AM  Total Bilirubin: 1.9 mg/dL at 9/9/2020  5:36 AM  INR(ratio): 1.1 at 9/9/2020  5:36 AM  Age: 50 years 3 months    Significant Labs:  Labs within the past month have been reviewed.    Significant Imaging:  Reviewed    Assessment/Plan:     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  Ms. Hernandez is a 49yo F w/PMH of von Gierke disease s/p liver transplant (2002, on tacro + MMF, follows Dr. Nielsen), hx chronic rejection (bx 2015 with acute and chronic rejection s/p steroids), biliary stricture and ductopenic rejection, recently with cirrhosis on fibroscan (10/2019), HTN, and depression who presents as a transfer for further evaluation of gastric outlet obstruction and esophageal stricturing.  EGD 9/3 at OSH with severe esophageal stricturing so transferred for GI evaluation.  Course complicated by gastric perforation s/p repair.  Hepatology following for immunosuppression management.    Since surgery, has been extubated but still encephalopathic.  Has had worsening liver enzymes with stable bili / AP. U/S liver transplant unremarkable. Restarted tacrolimus. Kidney function is worsening.    Tacrolimus level is therapeutic  Home dose is tacrolimus 2mg AM / 1mg PM and cellcept 500mg bid.    - continue tacrolimus 1mg AM and 1mg PM  - hold cellcept in setting of sepsis  - Start lactulose either PO or rectally for encephalopathy with goal 3-4 BMs daily  - When patient more stable, will need a liver biopsy  - Check daily tacrolimus trough, CBC, CMP and INR        Thank you  for your consult. I will follow-up with patient. Please contact us if you have any additional questions.    Js Matias MD  Hepatology  [facility]

## 2020-09-09 NOTE — NURSING
"      SICU PLAN OF CARE NOTE    Dx: Perforated abdominal viscus    Shift Events: Labetolol x2 given for HTN, Precedex gtt started;     Goals of Care: MAP >60; increase awareness     Neuro: Moves All Extremities and Confused    Vital Signs: BP (!) 152/69 (BP Location: Left arm, Patient Position: Lying)   Pulse 78   Temp 98.7 °F (37.1 °C) (Axillary)   Resp (!) 21   Ht 4' 11" (1.499 m)   Wt 58 kg (127 lb 13.9 oz)   SpO2 (!) 90%   BMI 25.83 kg/m²     Respiratory: 4L  Nasal Cannula     Diet: Tube Feeds at 30 ml/hr through G tube     Gtts: Precedex     Urine Output: Urinary Catheter 300-500 cc/hour     Labs/Accuchecks: Accuchecks q4; Hepatic panel     Skin: Previous skin tear near right wrist. Dressed with foam padding. Bruising present. Wound care saw pt yesterday. Frequent weight shift assistance provided throughout shift. No new breakdown or tears.     POC reviewed with pt and spouse, all question answered. See flowsheets for full assessment data. WCTM.         "

## 2020-09-09 NOTE — PROGRESS NOTES
"[facility]  Adult Nutrition  Progress Note    SUMMARY       Recommendations    1.) Advance diet as tolerated to low fiber per SLP texture recommendations once medically appropriate.   2.) If EN warranted, continue Impact Peptide 1.5; goal rate at 35mL/hr. Begin at 10mL/hr and advance 5-10mL Q8hr as tolerated to goal rate at 35mL/hr. EN provides 1260kcal, 78gm of protein, 646mL of free water. Add free water per MD recommendations. Hold EN for residuals >500cc.   3.) Monitor electrolytes and replace as needed.     Goals: pt to tolerate >85% of EEN/EPN by RD follow up  Nutrition Goal Status: new  Communication of RD Recs: other (comment)(POC)    Reason for Assessment    Reason For Assessment: RD follow-up  Diagnosis: (gastric outlet obstruction)  Relevant Medical History: Esophageal stricture; HTN; Liver fibrosis; Seizures; SCC; HTN; S/p liver tx  Interdisciplinary Rounds: did not attend  General Information Comments: Pt extubated. S/p ex lap for GI perforation. GI- LBM 8/27. Nsg staff reports TF were initaited this morning and pt seems to be tolerating them so far. Partial NFPE completed 9/6- pt meets ASPEN guidelines for moderate malnutrition.   Nutrition Discharge Planning: unable to determine    Nutrition Risk Screen    Nutrition Risk Screen: tube feeding or parenteral nutrition    Nutrition/Diet History    Food Allergies: NKFA  Factors Affecting Nutritional Intake: NPO    Anthropometrics    Temp: 97.6 °F (36.4 °C)  Height Method: Stated  Height: 4' 11" (149.9 cm)  Height (inches): 59 in  Weight Method: Bed Scale  Weight: 58 kg (127 lb 13.9 oz)  Weight (lb): 127.87 lb  Ideal Body Weight (IBW), Female: 95 lb  % Ideal Body Weight, Female (lb): 129.96 %  BMI (Calculated): 25.8  BMI Grade: 25 - 29.9 - overweight       Lab/Procedures/Meds    Pertinent Labs Reviewed: reviewed  Pertinent Labs Comments: Hgb 11.5, Hct 35.4, BUN 71, Cr 3.2, GFR 15.9, Glucose 124, Ca 7.9, Alb 2.4, , ALT " 162  Pertinent Medications Reviewed: reviewed  Pertinent Medications Comments: furosemide, pantoprazole, tacrolimus, heparin    Physical Findings/Assessment     Edema 3+ generalized    Estimated/Assessed Needs    Weight Used For Calorie Calculations: 58 kg (127 lb 13.9 oz)  Energy Calorie Requirements (kcal): 1314  Energy Need Method: [place]-St Jeor(x1.2 PAL)  Protein Requirements: 69-75gm (1.2-1.3gm/kg)  Weight Used For Protein Calculations: 58 kg (127 lb 13.9 oz)  Fluid Requirements (mL): 1 mL/kcal or per MD     RDA Method (mL): 1314         Nutrition Prescription Ordered    Current Diet Order: NPO (9/4)  Current Nutrition Support Formula Ordered: Impact Peptide 1.5  Current Nutrition Support Rate Ordered: 10 (ml)  Current Nutrition Support Frequency Ordered: mL/hr    Evaluation of Received Nutrient/Fluid Intake    Enteral Calories (kcal): 360  Enteral Protein (gm): 22  Enteral (Free Water) Fluid (mL): 184  Other Calories (kcal): 0  I/O: +15.9L since admission; I: 1268; O: 585  Energy Calories Required: not meeting needs  Protein Required: not meeting needs  Fluid Required: other (see comments)  Comments: no BM recorded  Tolerance: tolerating  % Intake of Estimated Energy Needs: 47  % Meal Intake: 0    Nutrition Risk    Level of Risk/Frequency of Follow-up: high , 2x weekly    Assessment and Plan    Moderate Protein-Calorie Malnutrition  Malnutrition in the context of Acute Illness/Injury     Related to (etiology):  Decreased intake      Signs and Symptoms (as evidenced by):  Energy Intake: <50% of estimated energy requirement for >3 weeks   Muscle Mass Depletion: mild depletion of temples   Weight Loss: 11% x 3 weeks per family   Fluid Accumulation: moderate     Interventions(treatment strategy):  Collaboration of care with other providers     Nutrition Diagnosis Status:  ongoing     Monitor and Evaluation    Food and Nutrient Intake: energy intake, food and beverage intake, enteral nutrition intake  Food and  Nutrient Adminstration: enteral and parenteral nutrition administration, diet order  Knowledge/Beliefs/Attitudes: food and nutrition knowledge/skill  Anthropometric Measurements: weight, weight change  Biochemical Data, Medical Tests and Procedures: electrolyte and renal panel, gastrointestinal profile, glucose/endocrine profile, inflammatory profile, lipid profile  Nutrition-Focused Physical Findings: overall appearance     Malnutrition Assessment  Malnutrition Type: acute illness or injury          Weight Loss (Malnutrition): (11% x 3 weeks)  Energy Intake (Malnutrition): less than or equal to 50% for greater than or equal to 1 month   Orbital Region (Subcutaneous Fat Loss): mild depletion  Upper Arm Region (Subcutaneous Fat Loss): well nourished   Hindu Region (Muscle Loss): mild depletion  Clavicle Bone Region (Muscle Loss): well nourished  Clavicle and Acromion Bone Region (Muscle Loss): well nourished                 Nutrition Follow-Up    RD Follow-up?: Yes

## 2020-09-09 NOTE — PROGRESS NOTES
[facility]  Critical Care - Surgery  Progress Note    Patient Name: Elda Hernandez  MRN: [id number]  Admission Date: 9/3/2020  Hospital Length of Stay: 6 days  Code Status: Full Code  Attending Provider: Clark Rodriguez MD  Primary Care Provider: Jordana Woods MD   Principal Problem: Perforated abdominal viscus    Subjective:     Hospital/ICU Course:  9/4: Pt admitted to SICU following ex lap for GI perf. Intubated, sedated. Wound vac in place.  Soon after arrival to unit a mottled appearance to RUE was noted.  Vascular consulted and US revealed R radial artery thrombosis.    9/5: Tachycardic..  R arm appearance greatly improved overnight.    9/6: Patient returned to OR for abdominal washout, closure. R arm with dopplerable signals to ulnar artery and palmar arch.   9/7: Attempted to wean patient off of sedation in an effort to move towards extubation. Patient weaned off of propofol. Precedex started. Patient's heart rate very labile and sensitize to sedation.   9/8: patient extubated  9/9: SHANICE. Labetalol PRN for HTN     Interval History/Significant Events: NAEO. Patient extubated yesterday and currently SHANICE. Patient remains confused/encephalopathic.     Follow-up For: Procedure(s) (LRB):  LAPAROTOMY, EXPLORATORY with abd closure (N/A)    Post-Operative Day: 3 Days Post-Op    Objective:     Vital Signs (Most Recent):  Temp: 97.6 °F (36.4 °C) (09/09/20 0300)  Pulse: 79 (09/09/20 0530)  Resp: 18 (09/09/20 0530)  BP: (!) 144/63 (09/09/20 0500)  SpO2: 96 % (09/09/20 0530) Vital Signs (24h Range):  Temp:  [97.6 °F (36.4 °C)-98.4 °F (36.9 °C)] 97.6 °F (36.4 °C)  Pulse:  [] 79  Resp:  [7-38] 18  SpO2:  [91 %-100 %] 96 %  BP: ()/(38-78) 144/63  Arterial Line BP: ()/(40-78) 163/70     Weight: 58 kg (127 lb 13.9 oz)  Body mass index is 25.83 kg/m².      Intake/Output Summary (Last 24 hours) at 9/9/2020 0608  Last data filed at 9/9/2020 0600  Gross per 24 hour   Intake 1268.5 ml    Output 450 ml   Net 818.5 ml       Physical Exam  Vitals signs reviewed.   Constitutional:       Appearance: She is normal weight.   HENT:      Head: Normocephalic and atraumatic.      Nose: Nose normal.      Mouth/Throat:      Mouth: Mucous membranes are moist.      Comments: intubated  Eyes:      Extraocular Movements: Extraocular movements intact.   Neck:      Musculoskeletal: Normal range of motion.      Comments: RIJ central line  Cardiovascular:      Rate and Rhythm: Normal rate and regular rhythm.   Pulmonary:      Effort: Pulmonary effort is normal.      Breath sounds: Normal breath sounds.   Abdominal:      Comments: Midline incision with dressing in place; dressing dry, intact with old blood stain; G tube in place   Musculoskeletal: Normal range of motion.   Skin:     General: Skin is warm.      Comments: Slightly pale, mottled appearance of distal R arm; continues to improve; R arm remains warm   Neurological:      General: No focal deficit present.      Mental Status: She is alert.      Comments: AAOx2         Vents:      Lines/Drains/Airways     Central Venous Catheter Line            Percutaneous Central Line Insertion/Assessment - Triple Lumen  09/04/20 0800 right internal jugular 4 days          Drain                 Gastrostomy/Enterostomy 09/04/20 LUQ decompression 5 days         Urethral Catheter 09/04/20 1300 Non-latex;Straight-tip 18 Fr. 4 days          Airway                 Airway - Non-Surgical 09/04/20 1122 Endotracheal Tube 4 days          Arterial Line            Arterial Line 09/04/20 1819 Left Femoral 4 days          Peripheral Intravenous Line                 Peripheral IV - Single Lumen 09/08/20 1525 22 G Left Antecubital less than 1 day                Significant Labs:    CBC/Anemia Profile:  Recent Labs   Lab 09/08/20  0319 09/09/20  0321   WBC 9.07 12.63   HGB 10.5* 11.5*   HCT 32.1* 35.4*   PLT 70* 80*   MCV 88 88   RDW 15.6* 15.9*        Chemistries:  Recent Labs   Lab  09/07/20  1744  09/08/20  0319  09/08/20  1201 09/08/20  1740 09/09/20  0047 09/09/20  0321     --  140  --   --   --   --  142   K 3.2*  --  4.2  --   --   --   --  3.7     --  109  --   --   --   --  110   CO2 18*  --  16*  --   --   --   --  15*   BUN 50*  --  57*  --   --   --   --  71*   CREATININE 2.9*  --  3.0*  --   --   --   --  3.2*   CALCIUM 7.6*  --  7.6*  --   --   --   --  7.9*   ALBUMIN 2.7*   < >  --    < > 2.3* 2.3* 2.2*  --    PROT 5.0*   < >  --    < > 4.8* 4.9* 4.7*  --    BILITOT 2.3*   < >  --    < > 1.8* 1.9* 1.8*  --    ALKPHOS 201*   < >  --    < > 206* 212* 209*  --    *   < >  --    < > 139* 143* 143*  --    *   < >  --    < > 475* 464* 469*  --     < > = values in this interval not displayed.       All pertinent labs within the past 24 hours have been reviewed.    Significant Imaging:  I have reviewed all pertinent imaging results/findings within the past 24 hours.    Assessment/Plan:     * Perforated abdominal viscus  Neuro:  -sedation: none  -analgesia: none   -continue seroquel       CV:   - Heparin gtt 10mg/kg/hr   - plan to d/c heparin gtt, will discuss with vascular surgery before doing so  - patient off of pressors    Pulm:  - SHANICE     FEN/GI: Perforated stomach; s/p washout and patch (9/4); abdominal closure (9/6)  - s/p washout and closure on 9/6   - d/c zosyn today (day 5)  - start trickle feeds today  - Pantoprazole 40mg IV daily  - Daily metabolic panel with PRN repletion of electrolytes  - repeat ammonia     Renal: Pt with JACQUE upon arrival; BUN/Cr: 32/2.1 (9/4)  - patient with oliguria, anuria over last 24hrs  - JACQUE worsening; BUN/Cr 71/3.2  - Continue to monitor with daily metabolic labs  - lasix challenge 9/6 with no improvement in UOP  - nephrology on board, appreciating recs     HEME/ID:   - 9/5: 2 U pRBC given with appropriate response  - Hb stable; 11.5 today  - daily CBC; stable H/H  - s/p Liver transplant in 2002; Daily tacrolimus level with  AM labs. Per hepatology, restart tacrolimus 1mg BID  - Consult ID     Endo:  - Hx of hypothyroidism; not taking synthroid at home    Dispo:  - Continue SICU care        Critical care was time spent personally by me on the following activities: development of treatment plan with patient or surrogate and bedside caregivers, discussions with consultants, evaluation of patient's response to treatment, examination of patient, ordering and performing treatments and interventions, ordering and review of laboratory studies, ordering and review of radiographic studies, pulse oximetry, re-evaluation of patient's condition.  This critical care time did not overlap with that of any other provider or involve time for any procedures.     Gurpreet Price MD  Critical Care - Surgery  [facility]

## 2020-09-09 NOTE — SUBJECTIVE & OBJECTIVE
Interval History:   Extubated yesterday.  Confused, intermittently yelling. Has not restarted lactulose yet.  On tacro 1mg bid.    Current Facility-Administered Medications   Medication    amLODIPine benzoate 1 mg/mL oral suspension 5 mg    dexmedetomidine (PRECEDEX) 400mcg/100mL 0.9% NaCL infusion    dextrose 50% injection 12.5 g    dextrose 50% injection 25 g    enoxaparin injection 30 mg    furosemide injection 80 mg    glucagon (human recombinant) injection 1 mg    glucose chewable tablet 16 g    glucose chewable tablet 24 g    heparin 25,000 units in dextrose 5% (100 units/ml) IV bolus from bag - ADDITIONAL PRN BOLUS - 30 units/kg (max bolus 4000 units)    heparin 25,000 units in dextrose 5% (100 units/ml) IV bolus from bag - ADDITIONAL PRN BOLUS - 60 units/kg (max bolus 4000 units)    hydrALAZINE injection 10 mg    labetalol 20 mg/4 mL (5 mg/mL) IV syring    lactulose 20 gram/30 mL solution Soln 20 g    levothyroxine tablet 75 mcg    melatonin tablet 6 mg    nitroGLYCERIN 2% TD oint ointment 0.5 inch    ondansetron injection 4 mg    oxyCODONE-acetaminophen 7.5-325 mg per tablet 1 tablet    pantoprazole injection 40 mg    promethazine tablet 25 mg    QUEtiapine tablet 50 mg    sodium chloride 0.9% flush 10 mL    sodium chloride 0.9% flush 10 mL    tacrolimus (PROGRAF) 1 mg/mL oral syringe       Objective:     Vital Signs (Most Recent):  Temp: 98.5 °F (36.9 °C) (09/09/20 1100)  Pulse: 93 (09/09/20 1115)  Resp: (!) 36 (09/09/20 1115)  BP: (!) 180/84 (09/09/20 1100)  SpO2: (!) 89 % (09/09/20 1115) Vital Signs (24h Range):  Temp:  [97.6 °F (36.4 °C)-98.5 °F (36.9 °C)] 98.5 °F (36.9 °C)  Pulse:  [] 93  Resp:  [7-48] 36  SpO2:  [88 %-100 %] 89 %  BP: ()/(38-84) 180/84  Arterial Line BP: ()/(40-90) 183/77     Weight: 58 kg (127 lb 13.9 oz) (09/06/20 0500)  Body mass index is 25.83 kg/m².    Physical Exam  Vitals signs and nursing note reviewed.   Constitutional:        Appearance: She is ill-appearing.   Eyes:      General: No scleral icterus.  Cardiovascular:      Rate and Rhythm: Normal rate.   Pulmonary:      Effort: Pulmonary effort is normal.   Abdominal:      General: Bowel sounds are normal.      Palpations: Abdomen is soft.   Skin:     Coloration: Skin is not jaundiced.   Neurological:      Mental Status: She is lethargic and disoriented.   Psychiatric:         Behavior: Behavior is uncooperative.         MELD-Na score: 21 at 9/9/2020  5:36 AM  MELD score: 21 at 9/9/2020  5:36 AM  Calculated from:  Serum Creatinine: 3.2 mg/dL at 9/9/2020  3:21 AM  Serum Sodium: 142 mmol/L (Rounded to 137 mmol/L) at 9/9/2020  3:21 AM  Total Bilirubin: 1.9 mg/dL at 9/9/2020  5:36 AM  INR(ratio): 1.1 at 9/9/2020  5:36 AM  Age: 50 years 3 months    Significant Labs:  Labs within the past month have been reviewed.    Significant Imaging:  Reviewed

## 2020-09-09 NOTE — PROGRESS NOTES
[facility]  General Surgery  Progress Note    Subjective:     History of Present Illness:  Ms. Hernandez is a 51yo F w/PMH of von Gierke disease s/p liver transplant (2002, on tacro + MMF, follows Dr. Nielsen), hx chronic rejection (bx 2015 with acute and chronic rejection s/p steroids), biliary stricture and ductopenic rejection, recently with cirrhosis on fibroscan (10/2019), HTN, and depression who presents as a transfer for further evaluation of gastric outlet obstruction and esophageal stricturing.  Patient decompensated requiring multiple pressors and intubation. CT scan showed free air. Prior to intubation patient reported severe abdominal pain.   states that patient has had epigastric pain, nausea, and vomiting for several days.     Post-Op Info:  Procedure(s) (LRB):  LAPAROTOMY, EXPLORATORY with abd closure (N/A)   3 Days Post-Op     Interval History:   Extubated yesterday.  Not doing much but crying when spoken to     Medications:  Continuous Infusions:   heparin (porcine) in D5W 12 Units/kg/hr (09/09/20 0500)    norepinephrine bitartrate-D5W Stopped (09/09/20 0200)     Scheduled Meds:   furosemide (LASIX) IV  80 mg Intravenous TID    hydrocortisone sodium succinate  100 mg Intravenous Q8H    nitroGLYCERIN 2% TD oint  0.5 inch Topical (Top) Q6H    oxyCODONE-acetaminophen  1 tablet Oral QID    pantoprazole  40 mg Intravenous Daily    QUEtiapine  100 mg Oral BID    tacrolimus  0.5 mg Sublingual BID     PRN Meds:dextrose 50%, dextrose 50%, glucagon (human recombinant), glucose, glucose, heparin (PORCINE), heparin (PORCINE), labetaloL, melatonin, ondansetron, oxyCODONE, promethazine, sodium chloride 0.9%, sodium chloride 0.9%     Review of patient's allergies indicates:   Allergen Reactions    Codeine Itching     Other reaction(s): Itching    Lipitor [atorvastatin] Other (See Comments)     Other reaction(s): Muscle pain  Muscle cranmps    Morphine Itching     Other  reaction(s): nausea and vomiting     Zoloft [sertraline] Other (See Comments)     Tremors/muscle spasms     Objective:     Vital Signs (Most Recent):  Temp: 97.6 °F (36.4 °C) (09/09/20 0300)  Pulse: 79 (09/09/20 0530)  Resp: 18 (09/09/20 0530)  BP: (!) 144/63 (09/09/20 0500)  SpO2: 96 % (09/09/20 0530) Vital Signs (24h Range):  Temp:  [97.6 °F (36.4 °C)-98.4 °F (36.9 °C)] 97.6 °F (36.4 °C)  Pulse:  [] 79  Resp:  [7-38] 18  SpO2:  [91 %-100 %] 96 %  BP: ()/(38-78) 144/63  Arterial Line BP: ()/(40-78) 163/70     Weight: 58 kg (127 lb 13.9 oz)  Body mass index is 25.83 kg/m².    Intake/Output - Last 3 Shifts       09/07 0700 - 09/08 0659 09/08 0700 - 09/09 0659    I.V. (mL/kg) 1258.9 (21.7) 1018.5 (17.6)    NG/GT  250    Total Intake(mL/kg) 1258.9 (21.7) 1268.5 (21.9)    Urine (mL/kg/hr) 502 (0.4) 480 (0.3)    Drains 225     Total Output 727 480    Net +531.9 +788.5                Physical Exam  Constitutional:       General: She is not in acute distress.  Cardiovascular:      Rate and Rhythm: Normal rate.   Abdominal:      General: There is no distension.      Palpations: Abdomen is soft.      Tenderness: There is no abdominal tenderness.      Comments: G tube    Skin:     General: Skin is warm and dry.         Significant Labs:  CBC:   Recent Labs   Lab 09/09/20 0321   WBC 12.63   RBC 4.04   HGB 11.5*   HCT 35.4*   PLT 80*   MCV 88   MCH 28.5   MCHC 32.5     CMP:   Recent Labs   Lab 09/09/20 0321 09/09/20  0536   *  --    CALCIUM 7.9*  --    ALBUMIN  --  2.4*   PROT  --  5.1*     --    K 3.7  --    CO2 15*  --      --    BUN 71*  --    CREATININE 3.2*  --    ALKPHOS  --  228*   ALT  --  162*   AST  --  536*   BILITOT  --  1.9*       Significant Diagnostics:  I have reviewed all pertinent imaging results/findings within the past 24 hours.    Assessment/Plan:     * Perforated abdominal viscus  50 y.o. female w/ free air on CT scan. S/p emergent ex lap on 9/4 w/ findings of  gastric perforation, primary repair. Now 3 Days Post-Op from abdominal closure on 9/6, skin stapled closed, KERRY drains removed.     Continue SICU care  Follow up ammonia levels  Can start trickle feeds through the G tube  Follow renal and hepatic function         Jhony Chappell MD  General Surgery  [facility]

## 2020-09-09 NOTE — ASSESSMENT & PLAN NOTE
Ms. Hernandez is a 51yo F w/PMH of von Gierke disease s/p liver transplant (2002, on tacro + MMF, follows Dr. Nielsen), hx chronic rejection (bx 2015 with acute and chronic rejection s/p steroids), biliary stricture and ductopenic rejection, recently with cirrhosis on fibroscan (10/2019), HTN, and depression who presents as a transfer for further evaluation of gastric outlet obstruction and esophageal stricturing.  EGD 9/3 at OSH with severe esophageal stricturing so transferred for GI evaluation.  Course complicated by gastric perforation s/p repair.  Hepatology following for immunosuppression management.    Since surgery, has been extubated but still encephalopathic.  Has had worsening liver enzymes with stable bili / AP. U/S liver transplant unremarkable. Restarted tacrolimus. Kidney function is worsening.    Tacrolimus level is therapeutic  Home dose is tacrolimus 2mg AM / 1mg PM and cellcept 500mg bid.    - continue tacrolimus 1mg AM and 1mg PM  - hold cellcept in setting of sepsis  - Start lactulose either PO or rectally for encephalopathy with goal 3-4 BMs daily  - When patient more stable, will need a liver biopsy  - Check daily tacrolimus trough, CBC, CMP and INR

## 2020-09-09 NOTE — ASSESSMENT & PLAN NOTE
Neuro:  -sedation: none  -analgesia: none   -continue seroquel       CV:   - Heparin gtt 10mg/kg/hr   - plan to d/c heparin gtt, will discuss with vascular surgery before doing so  - patient off of pressors    Pulm:  - SHANICE     FEN/GI: Perforated stomach; s/p washout and patch (9/4); abdominal closure (9/6)  - s/p washout and closure on 9/6   - d/c zosyn today (day 5)  - start trickle feeds today  - Pantoprazole 40mg IV daily  - Daily metabolic panel with PRN repletion of electrolytes  - repeat ammonia     Renal: Pt with JACQUE upon arrival; BUN/Cr: 32/2.1 (9/4)  - patient with oliguria, anuria over last 24hrs  - JACQUE worsening; BUN/Cr 71/3.2  - Continue to monitor with daily metabolic labs  - lasix challenge 9/6 with no improvement in UOP  - nephrology on board, appreciating recs     HEME/ID:   - 9/5: 2 U pRBC given with appropriate response  - Hb stable; 11.5 today  - daily CBC; stable H/H  - s/p Liver transplant in 2002; Daily tacrolimus level with AM labs. Per hepatology, restart tacrolimus 1mg BID  - Consult ID     Endo:  - Hx of hypothyroidism; not taking synthroid at home    Dispo:  - Continue SICU care

## 2020-09-09 NOTE — SUBJECTIVE & OBJECTIVE
Interval History: Patient seen and examined at bedside, no acute events overnight. Extubated yesterday. UOP documented at 585cc for the past 24 hours.     Review of patient's allergies indicates:   Allergen Reactions    Codeine Itching     Other reaction(s): Itching    Lipitor [atorvastatin] Other (See Comments)     Other reaction(s): Muscle pain  Muscle cranmps    Morphine Itching     Other reaction(s): nausea and vomiting     Zoloft [sertraline] Other (See Comments)     Tremors/muscle spasms     Current Facility-Administered Medications   Medication Frequency    amLODIPine benzoate 1 mg/mL oral suspension 5 mg Daily    dexmedetomidine (PRECEDEX) 400mcg/100mL 0.9% NaCL infusion Continuous    dextrose 50% injection 12.5 g PRN    dextrose 50% injection 25 g PRN    enoxaparin injection 30 mg Q24H    furosemide injection 80 mg TID    glucagon (human recombinant) injection 1 mg PRN    glucose chewable tablet 16 g PRN    glucose chewable tablet 24 g PRN    heparin 25,000 units in dextrose 5% (100 units/ml) IV bolus from bag - ADDITIONAL PRN BOLUS - 30 units/kg (max bolus 4000 units) PRN    heparin 25,000 units in dextrose 5% (100 units/ml) IV bolus from bag - ADDITIONAL PRN BOLUS - 60 units/kg (max bolus 4000 units) PRN    hydrALAZINE injection 10 mg Q8H PRN    labetalol 20 mg/4 mL (5 mg/mL) IV syring Q6H PRN    lactulose 20 gram/30 mL solution Soln 20 g TID    levothyroxine tablet 75 mcg Daily    melatonin tablet 6 mg Nightly PRN    nitroGLYCERIN 2% TD oint ointment 0.5 inch Q6H    ondansetron injection 4 mg Q8H PRN    oxyCODONE-acetaminophen 7.5-325 mg per tablet 1 tablet Q6H PRN    pantoprazole injection 40 mg Daily    promethazine tablet 25 mg Q6H PRN    QUEtiapine tablet 50 mg BID    sodium chloride 0.9% flush 10 mL PRN    sodium chloride 0.9% flush 10 mL PRN    tacrolimus (PROGRAF) 1 mg/mL oral syringe BID       Objective:     Vital Signs (Most Recent):  Temp: 98.5 °F (36.9 °C)  (09/09/20 1100)  Pulse: 77 (09/09/20 1415)  Resp: (!) 24 (09/09/20 1415)  BP: (!) 157/74 (09/09/20 1400)  SpO2: (!) 91 % (09/09/20 1400)  O2 Device (Oxygen Therapy): nasal cannula (09/09/20 1400) Vital Signs (24h Range):  Temp:  [97.6 °F (36.4 °C)-98.5 °F (36.9 °C)] 98.5 °F (36.9 °C)  Pulse:  [] 77  Resp:  [7-48] 24  SpO2:  [88 %-100 %] 91 %  BP: ()/(38-84) 157/74  Arterial Line BP: ()/(40-90) 156/71     Weight: 58 kg (127 lb 13.9 oz) (09/06/20 0500)  Body mass index is 25.83 kg/m².  Body surface area is 1.55 meters squared.    I/O last 3 completed shifts:  In: 2035.4 [I.V.:1775.4; NG/GT:260]  Out: 917 [Urine:792; Drains:125]    Physical Exam  Constitutional:       General: She is not in acute distress.  Cardiovascular:      Rate and Rhythm: Normal rate.   Abdominal:      General: There is no distension.      Palpations: Abdomen is soft.      Tenderness: There is no abdominal tenderness.      Comments: G tube    Skin:     General: Skin is warm and dry.         Significant Labs:  CBC:   Recent Labs   Lab 09/09/20  0321   WBC 12.63   RBC 4.04   HGB 11.5*   HCT 35.4*   PLT 80*   MCV 88   MCH 28.5   MCHC 32.5     CMP:   Recent Labs   Lab 09/09/20  0321  09/09/20  1213   *  --   --    CALCIUM 7.9*  --   --    ALBUMIN  --    < > 2.5*   PROT  --    < > 5.2*     --   --    K 3.7  --   --    CO2 15*  --   --      --   --    BUN 71*  --   --    CREATININE 3.2*  --   --    ALKPHOS  --    < > 251*   ALT  --    < > 184*   AST  --    < > 628*   BILITOT  --    < > 2.2*    < > = values in this interval not displayed.     All labs within the past 24 hours have been reviewed.     Significant Imaging:  Labs: Reviewed

## 2020-09-09 NOTE — PROGRESS NOTES
[facility]  Nephrology  Progress Note    Patient Name: Elda Hernandez  MRN: [id number]  Admission Date: 9/3/2020  Hospital Length of Stay: 6 days  Attending Provider: Clark Rodriguez MD   Primary Care Physician: Jordana Woods MD  Principal Problem:Perforated abdominal viscus    Subjective:     HPI: 50 y.o. female that has a past medical history of Angiolipoma of kidney (10/1/2018), Arnold-Chiari malformation, Depression, Esophageal stricture, Essential tremor, Hypertension, Liver fibrosis, Osteoporosis, Recurrent urinary tract infection, Seizures, SIADH (syndrome of inappropriate ADH production), Squamous cell carcinoma (10/2014), and Von Gierke disease s/p liver transplant (2002, on tacro + MMF), HTN, and depression that presented to OSH due to worsening diffuse abdominal for 3 weeks, associated with nausea, vomiting and decreased PO intake. Abdominal CT scan showed gastric outlet narrowing, obstructive-related changes at pyloric-duodenal junction, and thickening of GE junction. EGD on 9/3 found with grade IV esophagitis with stricture. Pt transferred for GI evaluation and found with hypotension requiring vasopressors and lactic Acid of 3.9. Repeat CT showed decompressed abdomen and evidence of free air. Exploratory laparotomy performed and found with stomach perforation s/p washout, gastric perforation repair, and gastrostomy tube placement.  Nephrology consulted for JACQUE    Interval History: Patient seen and examined at bedside, no acute events overnight. Extubated yesterday. UOP documented at 585cc for the past 24 hours.     Review of patient's allergies indicates:   Allergen Reactions    Codeine Itching     Other reaction(s): Itching    Lipitor [atorvastatin] Other (See Comments)     Other reaction(s): Muscle pain  Muscle cranmps    Morphine Itching     Other reaction(s): nausea and vomiting     Zoloft [sertraline] Other (See Comments)     Tremors/muscle spasms     Current  Facility-Administered Medications   Medication Frequency    amLODIPine benzoate 1 mg/mL oral suspension 5 mg Daily    dexmedetomidine (PRECEDEX) 400mcg/100mL 0.9% NaCL infusion Continuous    dextrose 50% injection 12.5 g PRN    dextrose 50% injection 25 g PRN    enoxaparin injection 30 mg Q24H    furosemide injection 80 mg TID    glucagon (human recombinant) injection 1 mg PRN    glucose chewable tablet 16 g PRN    glucose chewable tablet 24 g PRN    heparin 25,000 units in dextrose 5% (100 units/ml) IV bolus from bag - ADDITIONAL PRN BOLUS - 30 units/kg (max bolus 4000 units) PRN    heparin 25,000 units in dextrose 5% (100 units/ml) IV bolus from bag - ADDITIONAL PRN BOLUS - 60 units/kg (max bolus 4000 units) PRN    hydrALAZINE injection 10 mg Q8H PRN    labetalol 20 mg/4 mL (5 mg/mL) IV syring Q6H PRN    lactulose 20 gram/30 mL solution Soln 20 g TID    levothyroxine tablet 75 mcg Daily    melatonin tablet 6 mg Nightly PRN    nitroGLYCERIN 2% TD oint ointment 0.5 inch Q6H    ondansetron injection 4 mg Q8H PRN    oxyCODONE-acetaminophen 7.5-325 mg per tablet 1 tablet Q6H PRN    pantoprazole injection 40 mg Daily    promethazine tablet 25 mg Q6H PRN    QUEtiapine tablet 50 mg BID    sodium chloride 0.9% flush 10 mL PRN    sodium chloride 0.9% flush 10 mL PRN    tacrolimus (PROGRAF) 1 mg/mL oral syringe BID       Objective:     Vital Signs (Most Recent):  Temp: 98.5 °F (36.9 °C) (09/09/20 1100)  Pulse: 77 (09/09/20 1415)  Resp: (!) 24 (09/09/20 1415)  BP: (!) 157/74 (09/09/20 1400)  SpO2: (!) 91 % (09/09/20 1400)  O2 Device (Oxygen Therapy): nasal cannula (09/09/20 1400) Vital Signs (24h Range):  Temp:  [97.6 °F (36.4 °C)-98.5 °F (36.9 °C)] 98.5 °F (36.9 °C)  Pulse:  [] 77  Resp:  [7-48] 24  SpO2:  [88 %-100 %] 91 %  BP: ()/(38-84) 157/74  Arterial Line BP: ()/(40-90) 156/71     Weight: 58 kg (127 lb 13.9 oz) (09/06/20 0500)  Body mass index is 25.83 kg/m².  Body surface  area is 1.55 meters squared.    I/O last 3 completed shifts:  In: 2035.4 [I.V.:1775.4; NG/GT:260]  Out: 917 [Urine:792; Drains:125]    Physical Exam  Constitutional:       General: She is not in acute distress.  Cardiovascular:      Rate and Rhythm: Normal rate.   Abdominal:      General: There is no distension.      Palpations: Abdomen is soft.      Tenderness: There is no abdominal tenderness.      Comments: G tube    Skin:     General: Skin is warm and dry.         Significant Labs:  CBC:   Recent Labs   Lab 09/09/20 0321   WBC 12.63   RBC 4.04   HGB 11.5*   HCT 35.4*   PLT 80*   MCV 88   MCH 28.5   MCHC 32.5     CMP:   Recent Labs   Lab 09/09/20 0321 09/09/20  1213   *  --   --    CALCIUM 7.9*  --   --    ALBUMIN  --    < > 2.5*   PROT  --    < > 5.2*     --   --    K 3.7  --   --    CO2 15*  --   --      --   --    BUN 71*  --   --    CREATININE 3.2*  --   --    ALKPHOS  --    < > 251*   ALT  --    < > 184*   AST  --    < > 628*   BILITOT  --    < > 2.2*    < > = values in this interval not displayed.     All labs within the past 24 hours have been reviewed.     Significant Imaging:  Labs: Reviewed    Assessment/Plan:     JACQUE (acute kidney injury)  JACQUE most likely secondary to hypoperfusion, likely ATN.  Pt also with non anion gap metabolic acidosis with respiratory compensation  Given Lasix 150mg IV x1 to poor UOP on 09/07    - No emergent need for dialysis today  - continue Lasix 80mg TID, urine output increased after lasix  - Strict I/O and chart   - Renally dose all medications   - Avoid nephrotoxic medications, NSAIDs, IV contrast, ACE/ARB.  - Maintain MAP > 65  - Hb > 7 gm/dL        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  Management as per Hepatology   On Prograf and Hydrocortisone 100mg IV q 8 hrs         Thank you for your consult. I will follow-up with patient. Please contact us if you have any additional questions.    Al Shea MD  Nephrology  [facility]

## 2020-09-09 NOTE — ASSESSMENT & PLAN NOTE
50 y.o. female w/ free air on CT scan. S/p emergent ex lap on 9/4 w/ findings of gastric perforation, primary repair. Now 3 Days Post-Op from abdominal closure on 9/6, skin stapled closed, KERRY drains removed.     Continue SICU care  Follow up ammonia levels  Can start trickle feeds through the G tube  Follow renal and hepatic function

## 2020-09-09 NOTE — PLAN OF CARE
Recommendations     1.) Advance diet as tolerated to low fiber per SLP texture recommendations once medically appropriate.   2.) If EN warranted, continue Impact Peptide 1.5; goal rate at 35mL/hr. Begin at 10mL/hr and advance 5-10mL Q8hr as tolerated to goal rate at 35mL/hr. EN provides 1260kcal, 78gm of protein, 646mL of free water. Add free water per MD recommendations. Hold EN for residuals >500cc.   3.) Monitor electrolytes and replace as needed.      Goals: pt to tolerate >85% of EEN/EPN by RD follow up  Nutrition Goal Status: new  Communication of RD Recs: other (comment)(POC)
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
## 2020-09-09 NOTE — ASSESSMENT & PLAN NOTE
JACQUE most likely secondary to hypoperfusion, likely ATN.  Pt also with non anion gap metabolic acidosis with respiratory compensation  Given Lasix 150mg IV x1 to poor UOP on 09/07    - No emergent need for dialysis today  - continue Lasix 80mg TID, urine output increased after lasix  - Strict I/O and chart   - Renally dose all medications   - Avoid nephrotoxic medications, NSAIDs, IV contrast, ACE/ARB.  - Maintain MAP > 65  - Hb > 7 gm/dL

## 2020-09-10 PROBLEM — E87.0 HYPERNATREMIA: Status: ACTIVE | Noted: 2020-09-10

## 2020-09-10 LAB
ALBUMIN SERPL BCP-MCNC: 2 G/DL (ref 3.5–5.2)
ALBUMIN SERPL BCP-MCNC: 2.1 G/DL (ref 3.5–5.2)
ALBUMIN SERPL BCP-MCNC: 2.5 G/DL (ref 3.5–5.2)
ALBUMIN SERPL BCP-MCNC: 2.5 G/DL (ref 3.5–5.2)
ALBUMIN SERPL BCP-MCNC: 2.6 G/DL (ref 3.5–5.2)
ALLENS TEST: ABNORMAL
ALP SERPL-CCNC: 196 U/L (ref 55–135)
ALP SERPL-CCNC: 206 U/L (ref 55–135)
ALP SERPL-CCNC: 251 U/L (ref 55–135)
ALP SERPL-CCNC: 262 U/L (ref 55–135)
ALP SERPL-CCNC: 264 U/L (ref 55–135)
ALT SERPL W/O P-5'-P-CCNC: 115 U/L (ref 10–44)
ALT SERPL W/O P-5'-P-CCNC: 119 U/L (ref 10–44)
ALT SERPL W/O P-5'-P-CCNC: 161 U/L (ref 10–44)
ALT SERPL W/O P-5'-P-CCNC: 177 U/L (ref 10–44)
ALT SERPL W/O P-5'-P-CCNC: 179 U/L (ref 10–44)
AMMONIA PLAS-SCNC: 47 UMOL/L (ref 10–50)
ANION GAP SERPL CALC-SCNC: 10 MMOL/L (ref 8–16)
ANION GAP SERPL CALC-SCNC: 14 MMOL/L (ref 8–16)
ANION GAP SERPL CALC-SCNC: 15 MMOL/L (ref 8–16)
ANISOCYTOSIS BLD QL SMEAR: SLIGHT
ANISOCYTOSIS BLD QL SMEAR: SLIGHT
APTT BLDCRRT: 32.2 SEC (ref 21–32)
AST SERPL-CCNC: 182 U/L (ref 10–40)
AST SERPL-CCNC: 218 U/L (ref 10–40)
AST SERPL-CCNC: 380 U/L (ref 10–40)
AST SERPL-CCNC: 490 U/L (ref 10–40)
AST SERPL-CCNC: 517 U/L (ref 10–40)
BASO STIPL BLD QL SMEAR: ABNORMAL
BASO STIPL BLD QL SMEAR: ABNORMAL
BASOPHILS # BLD AUTO: ABNORMAL K/UL (ref 0–0.2)
BASOPHILS NFR BLD: 0 % (ref 0–1.9)
BASOPHILS NFR BLD: 0 % (ref 0–1.9)
BILIRUB DIRECT SERPL-MCNC: 1.3 MG/DL (ref 0.1–0.3)
BILIRUB DIRECT SERPL-MCNC: 1.6 MG/DL (ref 0.1–0.3)
BILIRUB DIRECT SERPL-MCNC: 1.7 MG/DL (ref 0.1–0.3)
BILIRUB DIRECT SERPL-MCNC: 1.7 MG/DL (ref 0.1–0.3)
BILIRUB DIRECT SERPL-MCNC: 1.8 MG/DL (ref 0.1–0.3)
BILIRUB SERPL-MCNC: 1.6 MG/DL (ref 0.1–1)
BILIRUB SERPL-MCNC: 2.1 MG/DL (ref 0.1–1)
BILIRUB SERPL-MCNC: 2.1 MG/DL (ref 0.1–1)
BILIRUB SERPL-MCNC: 2.2 MG/DL (ref 0.1–1)
BILIRUB SERPL-MCNC: 2.3 MG/DL (ref 0.1–1)
BUN SERPL-MCNC: 58 MG/DL (ref 6–20)
BUN SERPL-MCNC: 62 MG/DL (ref 6–20)
BUN SERPL-MCNC: 66 MG/DL (ref 6–20)
CALCIUM SERPL-MCNC: 7.6 MG/DL (ref 8.7–10.5)
CALCIUM SERPL-MCNC: 8 MG/DL (ref 8.7–10.5)
CALCIUM SERPL-MCNC: 8 MG/DL (ref 8.7–10.5)
CHLORIDE SERPL-SCNC: 107 MMOL/L (ref 95–110)
CHLORIDE SERPL-SCNC: 108 MMOL/L (ref 95–110)
CHLORIDE SERPL-SCNC: 111 MMOL/L (ref 95–110)
CO2 SERPL-SCNC: 27 MMOL/L (ref 23–29)
CO2 SERPL-SCNC: 29 MMOL/L (ref 23–29)
CO2 SERPL-SCNC: 29 MMOL/L (ref 23–29)
CREAT SERPL-MCNC: 1.7 MG/DL (ref 0.5–1.4)
CREAT SERPL-MCNC: 1.7 MG/DL (ref 0.5–1.4)
CREAT SERPL-MCNC: 2.2 MG/DL (ref 0.5–1.4)
DELSYS: ABNORMAL
DIFFERENTIAL METHOD: ABNORMAL
DIFFERENTIAL METHOD: ABNORMAL
EOSINOPHIL # BLD AUTO: ABNORMAL K/UL (ref 0–0.5)
EOSINOPHIL NFR BLD: 0.5 % (ref 0–8)
EOSINOPHIL NFR BLD: 1.5 % (ref 0–8)
ERYTHROCYTE [DISTWIDTH] IN BLOOD BY AUTOMATED COUNT: 15.6 % (ref 11.5–14.5)
ERYTHROCYTE [DISTWIDTH] IN BLOOD BY AUTOMATED COUNT: 15.8 % (ref 11.5–14.5)
EST. GFR  (AFRICAN AMERICAN): 28.9 ML/MIN/1.73 M^2
EST. GFR  (AFRICAN AMERICAN): 39.4 ML/MIN/1.73 M^2
EST. GFR  (AFRICAN AMERICAN): 39.4 ML/MIN/1.73 M^2
EST. GFR  (NON AFRICAN AMERICAN): 25 ML/MIN/1.73 M^2
EST. GFR  (NON AFRICAN AMERICAN): 34.2 ML/MIN/1.73 M^2
EST. GFR  (NON AFRICAN AMERICAN): 34.2 ML/MIN/1.73 M^2
FLOW: 5
GLUCOSE SERPL-MCNC: 145 MG/DL (ref 70–110)
GLUCOSE SERPL-MCNC: 176 MG/DL (ref 70–110)
GLUCOSE SERPL-MCNC: 182 MG/DL (ref 70–110)
HCO3 UR-SCNC: 30.3 MMOL/L (ref 24–28)
HCT VFR BLD AUTO: 30.6 % (ref 37–48.5)
HCT VFR BLD AUTO: 30.8 % (ref 37–48.5)
HCT VFR BLD CALC: 29 %PCV (ref 36–54)
HGB BLD-MCNC: 10.2 G/DL (ref 12–16)
HGB BLD-MCNC: 10.2 G/DL (ref 12–16)
HYPOCHROMIA BLD QL SMEAR: ABNORMAL
HYPOCHROMIA BLD QL SMEAR: ABNORMAL
IMM GRANULOCYTES # BLD AUTO: ABNORMAL K/UL (ref 0–0.04)
IMM GRANULOCYTES # BLD AUTO: ABNORMAL K/UL (ref 0–0.04)
IMM GRANULOCYTES NFR BLD AUTO: ABNORMAL % (ref 0–0.5)
IMM GRANULOCYTES NFR BLD AUTO: ABNORMAL % (ref 0–0.5)
INR PPP: 1.3 (ref 0.8–1.2)
LYMPHOCYTES # BLD AUTO: ABNORMAL K/UL (ref 1–4.8)
LYMPHOCYTES NFR BLD: 2.5 % (ref 18–48)
LYMPHOCYTES NFR BLD: 5.5 % (ref 18–48)
MAGNESIUM SERPL-MCNC: 1.8 MG/DL (ref 1.6–2.6)
MAGNESIUM SERPL-MCNC: 2.4 MG/DL (ref 1.6–2.6)
MAGNESIUM SERPL-MCNC: 2.7 MG/DL (ref 1.6–2.6)
MCH RBC QN AUTO: 28.9 PG (ref 27–31)
MCH RBC QN AUTO: 29.3 PG (ref 27–31)
MCHC RBC AUTO-ENTMCNC: 33.1 G/DL (ref 32–36)
MCHC RBC AUTO-ENTMCNC: 33.3 G/DL (ref 32–36)
MCV RBC AUTO: 87 FL (ref 82–98)
MCV RBC AUTO: 88 FL (ref 82–98)
METAMYELOCYTES NFR BLD MANUAL: 0.5 %
METAMYELOCYTES NFR BLD MANUAL: 1 %
MODE: ABNORMAL
MONOCYTES # BLD AUTO: ABNORMAL K/UL (ref 0.3–1)
MONOCYTES NFR BLD: 6 % (ref 4–15)
MONOCYTES NFR BLD: 6.5 % (ref 4–15)
MYELOCYTES NFR BLD MANUAL: 0.5 %
MYELOCYTES NFR BLD MANUAL: 1 %
NEUTROPHILS NFR BLD: 82.5 % (ref 38–73)
NEUTROPHILS NFR BLD: 85.5 % (ref 38–73)
NEUTS BAND NFR BLD MANUAL: 2.5 %
NEUTS BAND NFR BLD MANUAL: 3.5 %
NRBC BLD-RTO: 0 /100 WBC
NRBC BLD-RTO: 0 /100 WBC
PCO2 BLDA: 34.6 MMHG (ref 35–45)
PH SMN: 7.55 [PH] (ref 7.35–7.45)
PHOSPHATE SERPL-MCNC: 2 MG/DL (ref 2.7–4.5)
PHOSPHATE SERPL-MCNC: 2.6 MG/DL (ref 2.7–4.5)
PLATELET # BLD AUTO: 105 K/UL (ref 150–350)
PLATELET # BLD AUTO: 109 K/UL (ref 150–350)
PLATELET BLD QL SMEAR: ABNORMAL
PLATELET BLD QL SMEAR: ABNORMAL
PMV BLD AUTO: 10.7 FL (ref 9.2–12.9)
PMV BLD AUTO: 11 FL (ref 9.2–12.9)
PO2 BLDA: 26 MMHG (ref 40–60)
POC BE: 8 MMOL/L
POC IONIZED CALCIUM: 1.07 MMOL/L (ref 1.06–1.42)
POC SATURATED O2: 57 % (ref 95–100)
POC TCO2: 31 MMOL/L (ref 24–29)
POCT GLUCOSE: 142 MG/DL (ref 70–110)
POCT GLUCOSE: 155 MG/DL (ref 70–110)
POCT GLUCOSE: 159 MG/DL (ref 70–110)
POCT GLUCOSE: 175 MG/DL (ref 70–110)
POCT GLUCOSE: 175 MG/DL (ref 70–110)
POCT GLUCOSE: 195 MG/DL (ref 70–110)
POCT GLUCOSE: 209 MG/DL (ref 70–110)
POLYCHROMASIA BLD QL SMEAR: ABNORMAL
POLYCHROMASIA BLD QL SMEAR: ABNORMAL
POTASSIUM BLD-SCNC: 2.4 MMOL/L (ref 3.5–5.1)
POTASSIUM SERPL-SCNC: 2.3 MMOL/L (ref 3.5–5.1)
POTASSIUM SERPL-SCNC: 2.4 MMOL/L (ref 3.5–5.1)
POTASSIUM SERPL-SCNC: 3.8 MMOL/L (ref 3.5–5.1)
PROMYELOCYTES NFR BLD MANUAL: 0.5 %
PROT SERPL-MCNC: 4.5 G/DL (ref 6–8.4)
PROT SERPL-MCNC: 4.8 G/DL (ref 6–8.4)
PROT SERPL-MCNC: 5.2 G/DL (ref 6–8.4)
PROT SERPL-MCNC: 5.3 G/DL (ref 6–8.4)
PROT SERPL-MCNC: 5.3 G/DL (ref 6–8.4)
PROTHROMBIN TIME: 13.7 SEC (ref 9–12.5)
RBC # BLD AUTO: 3.48 M/UL (ref 4–5.4)
RBC # BLD AUTO: 3.53 M/UL (ref 4–5.4)
SAMPLE: ABNORMAL
SITE: ABNORMAL
SODIUM BLD-SCNC: 149 MMOL/L (ref 136–145)
SODIUM SERPL-SCNC: 149 MMOL/L (ref 136–145)
SODIUM SERPL-SCNC: 150 MMOL/L (ref 136–145)
SODIUM SERPL-SCNC: 151 MMOL/L (ref 136–145)
TACROLIMUS BLD-MCNC: 4.2 NG/ML (ref 5–15)
TOXIC GRANULES BLD QL SMEAR: PRESENT
TOXIC GRANULES BLD QL SMEAR: PRESENT
WBC # BLD AUTO: 17.5 K/UL (ref 3.9–12.7)
WBC # BLD AUTO: 17.94 K/UL (ref 3.9–12.7)
WBC TOXIC VACUOLES BLD QL SMEAR: PRESENT

## 2020-09-10 PROCEDURE — 93010 EKG 12-LEAD: ICD-10-PCS | Mod: ,,, | Performed by: INTERNAL MEDICINE

## 2020-09-10 PROCEDURE — 20000000 HC ICU ROOM

## 2020-09-10 PROCEDURE — 80197 ASSAY OF TACROLIMUS: CPT

## 2020-09-10 PROCEDURE — 25000003 PHARM REV CODE 250: Performed by: SURGERY

## 2020-09-10 PROCEDURE — 25000003 PHARM REV CODE 250: Performed by: STUDENT IN AN ORGANIZED HEALTH CARE EDUCATION/TRAINING PROGRAM

## 2020-09-10 PROCEDURE — 93005 ELECTROCARDIOGRAM TRACING: CPT

## 2020-09-10 PROCEDURE — 85610 PROTHROMBIN TIME: CPT

## 2020-09-10 PROCEDURE — 85730 THROMBOPLASTIN TIME PARTIAL: CPT

## 2020-09-10 PROCEDURE — 80076 HEPATIC FUNCTION PANEL: CPT | Mod: 91

## 2020-09-10 PROCEDURE — 94761 N-INVAS EAR/PLS OXIMETRY MLT: CPT

## 2020-09-10 PROCEDURE — 84100 ASSAY OF PHOSPHORUS: CPT | Mod: 91

## 2020-09-10 PROCEDURE — 82140 ASSAY OF AMMONIA: CPT

## 2020-09-10 PROCEDURE — 80076 HEPATIC FUNCTION PANEL: CPT

## 2020-09-10 PROCEDURE — 27000221 HC OXYGEN, UP TO 24 HOURS

## 2020-09-10 PROCEDURE — 85007 BL SMEAR W/DIFF WBC COUNT: CPT

## 2020-09-10 PROCEDURE — 85014 HEMATOCRIT: CPT

## 2020-09-10 PROCEDURE — 83735 ASSAY OF MAGNESIUM: CPT

## 2020-09-10 PROCEDURE — 63600175 PHARM REV CODE 636 W HCPCS: Performed by: STUDENT IN AN ORGANIZED HEALTH CARE EDUCATION/TRAINING PROGRAM

## 2020-09-10 PROCEDURE — 82803 BLOOD GASES ANY COMBINATION: CPT

## 2020-09-10 PROCEDURE — 99291 PR CRITICAL CARE, E/M 30-74 MINUTES: ICD-10-PCS | Mod: 24,,, | Performed by: SURGERY

## 2020-09-10 PROCEDURE — 63600175 PHARM REV CODE 636 W HCPCS: Performed by: SURGERY

## 2020-09-10 PROCEDURE — 85027 COMPLETE CBC AUTOMATED: CPT | Mod: 91

## 2020-09-10 PROCEDURE — 99232 PR SUBSEQUENT HOSPITAL CARE,LEVL II: ICD-10-PCS | Mod: ,,, | Performed by: INTERNAL MEDICINE

## 2020-09-10 PROCEDURE — 99232 SBSQ HOSP IP/OBS MODERATE 35: CPT | Mod: ,,, | Performed by: INTERNAL MEDICINE

## 2020-09-10 PROCEDURE — 93010 ELECTROCARDIOGRAM REPORT: CPT | Mod: ,,, | Performed by: INTERNAL MEDICINE

## 2020-09-10 PROCEDURE — 80048 BASIC METABOLIC PNL TOTAL CA: CPT

## 2020-09-10 PROCEDURE — 99291 CRITICAL CARE FIRST HOUR: CPT | Mod: 24,,, | Performed by: SURGERY

## 2020-09-10 PROCEDURE — 99900035 HC TECH TIME PER 15 MIN (STAT)

## 2020-09-10 PROCEDURE — C9113 INJ PANTOPRAZOLE SODIUM, VIA: HCPCS | Performed by: STUDENT IN AN ORGANIZED HEALTH CARE EDUCATION/TRAINING PROGRAM

## 2020-09-10 PROCEDURE — 84295 ASSAY OF SERUM SODIUM: CPT

## 2020-09-10 PROCEDURE — 84100 ASSAY OF PHOSPHORUS: CPT

## 2020-09-10 PROCEDURE — 82330 ASSAY OF CALCIUM: CPT

## 2020-09-10 PROCEDURE — 84132 ASSAY OF SERUM POTASSIUM: CPT

## 2020-09-10 PROCEDURE — 83735 ASSAY OF MAGNESIUM: CPT | Mod: 91

## 2020-09-10 PROCEDURE — 80048 BASIC METABOLIC PNL TOTAL CA: CPT | Mod: 91

## 2020-09-10 RX ORDER — ENOXAPARIN SODIUM 100 MG/ML
40 INJECTION SUBCUTANEOUS EVERY 24 HOURS
Status: DISCONTINUED | OUTPATIENT
Start: 2020-09-10 | End: 2020-09-10

## 2020-09-10 RX ORDER — MAGNESIUM SULFATE HEPTAHYDRATE 40 MG/ML
4 INJECTION, SOLUTION INTRAVENOUS
Status: DISCONTINUED | OUTPATIENT
Start: 2020-09-10 | End: 2020-10-05

## 2020-09-10 RX ORDER — POTASSIUM CHLORIDE 7.45 MG/ML
60 INJECTION INTRAVENOUS
Status: DISCONTINUED | OUTPATIENT
Start: 2020-09-10 | End: 2020-09-10

## 2020-09-10 RX ORDER — POTASSIUM CHLORIDE 14.9 MG/ML
60 INJECTION INTRAVENOUS
Status: DISCONTINUED | OUTPATIENT
Start: 2020-09-10 | End: 2020-10-05

## 2020-09-10 RX ORDER — MAGNESIUM SULFATE HEPTAHYDRATE 40 MG/ML
2 INJECTION, SOLUTION INTRAVENOUS
Status: DISCONTINUED | OUTPATIENT
Start: 2020-09-10 | End: 2020-10-05

## 2020-09-10 RX ORDER — POTASSIUM CHLORIDE 29.8 MG/ML
40 INJECTION INTRAVENOUS
Status: DISCONTINUED | OUTPATIENT
Start: 2020-09-10 | End: 2020-10-05

## 2020-09-10 RX ORDER — POTASSIUM CHLORIDE 7.45 MG/ML
80 INJECTION INTRAVENOUS
Status: DISCONTINUED | OUTPATIENT
Start: 2020-09-10 | End: 2020-09-10

## 2020-09-10 RX ORDER — POTASSIUM CHLORIDE 7.45 MG/ML
40 INJECTION INTRAVENOUS
Status: DISCONTINUED | OUTPATIENT
Start: 2020-09-10 | End: 2020-09-10

## 2020-09-10 RX ORDER — MAGNESIUM SULFATE HEPTAHYDRATE 40 MG/ML
4 INJECTION, SOLUTION INTRAVENOUS
Status: DISCONTINUED | OUTPATIENT
Start: 2020-09-10 | End: 2020-09-10

## 2020-09-10 RX ORDER — HEPARIN SODIUM 5000 [USP'U]/ML
5000 INJECTION, SOLUTION INTRAVENOUS; SUBCUTANEOUS EVERY 8 HOURS
Status: DISCONTINUED | OUTPATIENT
Start: 2020-09-10 | End: 2020-10-14 | Stop reason: HOSPADM

## 2020-09-10 RX ORDER — POTASSIUM CHLORIDE 29.8 MG/ML
80 INJECTION INTRAVENOUS
Status: DISCONTINUED | OUTPATIENT
Start: 2020-09-10 | End: 2020-10-05

## 2020-09-10 RX ORDER — QUETIAPINE FUMARATE 100 MG/1
100 TABLET, FILM COATED ORAL 2 TIMES DAILY
Status: DISCONTINUED | OUTPATIENT
Start: 2020-09-10 | End: 2020-09-11

## 2020-09-10 RX ORDER — POTASSIUM CHLORIDE 1.5 G/1.58G
40 POWDER, FOR SOLUTION ORAL ONCE
Status: COMPLETED | OUTPATIENT
Start: 2020-09-10 | End: 2020-09-10

## 2020-09-10 RX ORDER — MAGNESIUM SULFATE HEPTAHYDRATE 40 MG/ML
2 INJECTION, SOLUTION INTRAVENOUS
Status: DISCONTINUED | OUTPATIENT
Start: 2020-09-10 | End: 2020-09-10

## 2020-09-10 RX ORDER — PANTOPRAZOLE SODIUM 40 MG/1
40 FOR SUSPENSION ORAL DAILY
Status: DISCONTINUED | OUTPATIENT
Start: 2020-09-11 | End: 2020-09-11

## 2020-09-10 RX ADMIN — NITROGLYCERIN 0.5 INCH: 20 OINTMENT TOPICAL at 12:09

## 2020-09-10 RX ADMIN — SODIUM PHOSPHATE, MONOBASIC, MONOHYDRATE 15 MMOL: 276; 142 INJECTION, SOLUTION INTRAVENOUS at 02:09

## 2020-09-10 RX ADMIN — QUETIAPINE FUMARATE 100 MG: 100 TABLET ORAL at 09:09

## 2020-09-10 RX ADMIN — NITROGLYCERIN 0.5 INCH: 20 OINTMENT TOPICAL at 11:09

## 2020-09-10 RX ADMIN — LEVOTHYROXINE SODIUM 75 MCG: 75 TABLET ORAL at 09:09

## 2020-09-10 RX ADMIN — POTASSIUM CHLORIDE 80 MEQ: 29.8 INJECTION, SOLUTION INTRAVENOUS at 04:09

## 2020-09-10 RX ADMIN — HEPARIN SODIUM 5000 UNITS: 5000 INJECTION INTRAVENOUS; SUBCUTANEOUS at 02:09

## 2020-09-10 RX ADMIN — LACTULOSE 20 G: 20 SOLUTION ORAL at 09:09

## 2020-09-10 RX ADMIN — TACROLIMUS 1 MG: 1 CAPSULE, GELATIN COATED ORAL at 05:09

## 2020-09-10 RX ADMIN — SODIUM CHLORIDE, SODIUM LACTATE, POTASSIUM CHLORIDE, AND CALCIUM CHLORIDE 500 ML: .6; .31; .03; .02 INJECTION, SOLUTION INTRAVENOUS at 09:09

## 2020-09-10 RX ADMIN — TACROLIMUS 1 MG: 1 CAPSULE, GELATIN COATED ORAL at 08:09

## 2020-09-10 RX ADMIN — NITROGLYCERIN 0.5 INCH: 20 OINTMENT TOPICAL at 05:09

## 2020-09-10 RX ADMIN — DEXMEDETOMIDINE HYDROCHLORIDE 0.6 MCG/KG/HR: 4 INJECTION, SOLUTION INTRAVENOUS at 09:09

## 2020-09-10 RX ADMIN — POTASSIUM CHLORIDE 40 MEQ: 1.5 POWDER, FOR SOLUTION ORAL at 01:09

## 2020-09-10 RX ADMIN — MAGNESIUM SULFATE 2 G: 2 INJECTION INTRAVENOUS at 08:09

## 2020-09-10 RX ADMIN — HEPARIN SODIUM 5000 UNITS: 5000 INJECTION INTRAVENOUS; SUBCUTANEOUS at 09:09

## 2020-09-10 RX ADMIN — LACTULOSE 20 G: 20 SOLUTION ORAL at 02:09

## 2020-09-10 RX ADMIN — SODIUM CHLORIDE, SODIUM LACTATE, POTASSIUM CHLORIDE, AND CALCIUM CHLORIDE 500 ML: .6; .31; .03; .02 INJECTION, SOLUTION INTRAVENOUS at 04:09

## 2020-09-10 RX ADMIN — PANTOPRAZOLE SODIUM 40 MG: 40 INJECTION, POWDER, LYOPHILIZED, FOR SOLUTION INTRAVENOUS at 09:09

## 2020-09-10 NOTE — PROGRESS NOTES
Pt confused and pulled out arterial line. Pressure held until bleeding stopped.  MD notified and came to bedside.  CBC and order for restraints obtained.  Frequent site checks and pulse checks occurred. Distal pulses are 2+.  Will continue to monitor site.

## 2020-09-10 NOTE — ASSESSMENT & PLAN NOTE
Please start FWB At 100cc q 2 hours. If Na worsens on repeat BMP consider Dextrose infusion to decrease sodium

## 2020-09-10 NOTE — PLAN OF CARE
SW is following this Pt for DC planning needs. There are no identified needs at this time. Discharge Disposition: TBD - pending patient progress    SW will continue to coordinate with patient, family, team and insurance to complete patient's discharge plan.    Tigist Moody LMSW   - Case Management

## 2020-09-10 NOTE — PROGRESS NOTES
[facility]  Critical Care - Surgery  Progress Note    Patient Name: Elda Hernandez  MRN: [id number]  Admission Date: 9/3/2020  Hospital Length of Stay: 7 days  Code Status: Full Code  Attending Provider: Clark oRdriguez MD  Primary Care Provider: Jordana Woods MD   Principal Problem: Perforated abdominal viscus    Subjective:     Hospital/ICU Course:  9/4: Pt admitted to SICU following ex lap for GI perf. Intubated, sedated. Wound vac in place.  Soon after arrival to unit a mottled appearance to RUE was noted.  Vascular consulted and US revealed R radial artery thrombosis.    9/5: Tachycardic..  R arm appearance greatly improved overnight.    9/6: Patient returned to OR for abdominal washout, closure. R arm with dopplerable signals to ulnar artery and palmar arch.   9/7: Attempted to wean patient off of sedation in an effort to move towards extubation. Patient weaned off of propofol. Precedex started. Patient's heart rate very labile and sensitize to sedation.   9/8: patient extubated  9/9: SHANICE. Labetalol PRN for HTN   9/10:  Diuresis increased.  Cr downtrending    Interval History/Significant Events: Pt somewhat agitated overnight.  Precedex increased.  She pulled out her femoral arterial line overnight as well.  There was copious bleeding at first that was controlled by holding pressure at site for >15min.  Hematoma near L inguinal ligament.    Follow-up For: Procedure(s) (LRB):  LAPAROTOMY, EXPLORATORY with abd closure (N/A)    Post-Operative Day: 4 Days Post-Op    Objective:     Vital Signs (Most Recent):  Temp: 97.5 °F (36.4 °C) (09/10/20 0300)  Pulse: 105 (09/10/20 0615)  Resp: (!) 37 (09/10/20 0615)  BP: (!) 161/63 (09/10/20 0615)  SpO2: (!) 93 % (09/10/20 0615) Vital Signs (24h Range):  Temp:  [97.4 °F (36.3 °C)-98.7 °F (37.1 °C)] 97.5 °F (36.4 °C)  Pulse:  [] 105  Resp:  [20-50] 37  SpO2:  [86 %-97 %] 93 %  BP: (103-194)/() 161/63  Arterial Line BP: (153-208)/()  191/80     Weight: 67 kg (147 lb 11.3 oz)  Body mass index is 29.83 kg/m².      Intake/Output Summary (Last 24 hours) at 9/10/2020 0628  Last data filed at 9/10/2020 0600  Gross per 24 hour   Intake 1671 ml   Output 8655 ml   Net -6984 ml       Physical Exam  Vitals signs and nursing note reviewed.   Constitutional:       General: She is not in acute distress.  HENT:      Head: Normocephalic and atraumatic.      Nose: Nose normal.      Mouth/Throat:      Mouth: Mucous membranes are moist.   Eyes:      Extraocular Movements: Extraocular movements intact.   Neck:      Musculoskeletal: Normal range of motion.      Comments: RIJ central line  Cardiovascular:      Rate and Rhythm: Regular rhythm. Tachycardia present.      Heart sounds: Normal heart sounds.   Pulmonary:      Breath sounds: Normal breath sounds. No wheezing.   Abdominal:      Comments: Midline incision with dressing in place; dressing dry, intact with old blood stain; G tube in place   Musculoskeletal: Normal range of motion.   Skin:     General: Skin is warm.      Comments: L peringuinal hematoma.  LLE pulses 2+.   Neurological:      General: No focal deficit present.      Mental Status: She is alert.      Comments: AAOx2         Vents:  Vent Mode: Spont/T (09/08/20 1607)  Ventilator Initiated: Yes (09/04/20 1130)  Set Rate: 14 BPM (09/08/20 1142)  Vt Set: 330 mL (09/08/20 1142)  Pressure Support: 5 cmH20 (09/08/20 1607)  PEEP/CPAP: 5 cmH20 (09/08/20 1607)  Oxygen Concentration (%): 40 (09/08/20 1142)  Peak Airway Pressure: 21 cmH2O (09/08/20 1142)  Plateau Pressure: 14 cmH20 (09/08/20 0850)  Total Ve: 4.54 mL (09/08/20 1142)  Negative Inspiratory Force (cm H2O): -35 (09/08/20 1607)  F/VT Ratio<105 (RSBI): (!) 43.21 (09/08/20 1142)    Lines/Drains/Airways     Central Venous Catheter Line            Percutaneous Central Line Insertion/Assessment - Triple Lumen  09/04/20 0800 right internal jugular 5 days          Drain                  Gastrostomy/Enterostomy 09/04/20 LUQ decompression 6 days         Urethral Catheter 09/04/20 1300 Non-latex;Straight-tip 18 Fr. 5 days          Peripheral Intravenous Line                 Peripheral IV - Single Lumen 09/08/20 1525 22 G Left Antecubital 1 day                Significant Labs:    CBC/Anemia Profile:  Recent Labs   Lab 09/09/20  2041 09/10/20  0009 09/10/20  0250   WBC 16.20* 17.94* 17.50*   HGB 10.5* 10.2* 10.2*   HCT 31.7* 30.6* 30.8*   * 105* 109*   MCV 86 88 87   RDW 15.7* 15.8* 15.6*        Chemistries:  Recent Labs   Lab 09/09/20  0321  09/09/20  1814 09/10/20  0009 09/10/20  0250     --   --   --  149*   K 3.7  --   --   --  2.4*     --   --   --  107   CO2 15*  --   --   --  27   BUN 71*  --   --   --  66*   CREATININE 3.2*  --   --   --  2.2*   CALCIUM 7.9*  --   --   --  8.0*   ALBUMIN  --    < > 2.5* 2.5* 2.6*   PROT  --    < > 5.2* 5.3* 5.3*   BILITOT  --    < > 2.2* 2.1* 2.2*   ALKPHOS  --    < > 259* 262* 264*   ALT  --    < > 182* 179* 177*   AST  --    < > 581* 517* 490*    < > = values in this interval not displayed.       All pertinent labs within the past 24 hours have been reviewed.    Significant Imaging:  I have reviewed all pertinent imaging results/findings within the past 24 hours.    Assessment/Plan:     * Perforated abdominal viscus  Neuro:  -sedation: precedex  -analgesia: PRN oxy   -continue seroquel       CV:   - heparin gtt discontinued 9/9.   - HDS off of pressors    Pulm:  - 2L NC     FEN/GI: Perforated stomach; s/p washout and patch (9/4); abdominal closure (9/6)  - s/p washout and closure on 9/6   - Impact peptide tube feeds  - Pantoprazole 40mg IV daily  - Daily metabolic panel with PRN repletion of electrolytes  - Lactulose 20g TID; no bowel movements in 20hours.  Will consider rectal admin  - repeat ammonia  - pantoprazole for ulcer ppx     Renal: Pt with JACQUE upon arrival; BUN/Cr: 32/2.1 (9/4)  - patient with oliguria, anuria over last 24hrs  -  JACQUE improved overnight  BUN/Cr - 66/2.2  - Continue to monitor with daily metabolic labs  - nephrology on board, appreciating recs     HEME/ID:   - 9/5: 2 U pRBC given with appropriate response  - Hb dropped overnight 2/2 pt removing her femoral A-line; stable @ 10.2 currently  - daily CBC  - s/p Liver transplant in 2002; Daily tacrolimus level with AM labs. Per hepatology, restart tacrolimus 1mg BID  - Consult ID  - Lovenox for DVT ppx     Endo:  - Hx of hypothyroidism; not taking synthroid at home    Dispo:  - Continue SICU care           Critical care was time spent personally by me on the following activities: development of treatment plan with patient or surrogate and bedside caregivers, discussions with consultants, evaluation of patient's response to treatment, examination of patient, ordering and performing treatments and interventions, ordering and review of laboratory studies, ordering and review of radiographic studies, pulse oximetry, re-evaluation of patient's condition.  This critical care time did not overlap with that of any other provider or involve time for any procedures.     John Spivey MD  Critical Care - Surgery  [facility]
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
## 2020-09-10 NOTE — PLAN OF CARE
VSS today, lytes replaced. Tolerating tube feeds. 500 ml LR given x1 for SBP 80s. Responded well. Adequate UOP.     Patient remains restrained as she repeatedly attempts to pull at PEG tube and TLC; however, she has become more subdued throughout shift and is more lucid.      came to bedside today and was updated on plan of care.

## 2020-09-10 NOTE — SUBJECTIVE & OBJECTIVE
Interval History: No acute events overnight.  Much less agitated since starting seroquel. Precedex off. Persistent leukocytosis, but AF and HDS. 500cc bolus given this evening for soft BP.    Medications:  Continuous Infusions:   dexmedetomidine (PRECEDEX) infusion Stopped (09/10/20 1435)     Scheduled Meds:   amLODIPine benzoate  5 mg Per G Tube Daily    heparin (porcine)  5,000 Units Subcutaneous Q8H    lactulose  20 g Per G Tube TID    levothyroxine  75 mcg Per G Tube Daily    nitroGLYCERIN 2% TD oint  0.5 inch Topical (Top) Q6H    [START ON 9/11/2020] pantoprazole  40 mg Per G Tube Daily    QUEtiapine  100 mg Per G Tube BID    tacrolimus  1 mg Per G Tube BID     PRN Meds:calcium gluconate IVPB, calcium gluconate IVPB, calcium gluconate IVPB, dextrose 50%, dextrose 50%, glucagon (human recombinant), glucose, glucose, hydrALAZINE, labetaloL, magnesium sulfate IVPB, magnesium sulfate IVPB, melatonin, ondansetron, oxyCODONE-acetaminophen, potassium chloride in water **AND** potassium chloride in water **AND** potassium chloride in water, promethazine, sodium chloride 0.9%, sodium chloride 0.9%, sodium phosphate IVPB, sodium phosphate IVPB, sodium phosphate IVPB     Review of patient's allergies indicates:   Allergen Reactions    Codeine Itching     Other reaction(s): Itching    Lipitor [atorvastatin] Other (See Comments)     Other reaction(s): Muscle pain  Muscle cranmps    Morphine Itching     Other reaction(s): nausea and vomiting     Zoloft [sertraline] Other (See Comments)     Tremors/muscle spasms     Objective:     Vital Signs (Most Recent):  Temp: 98.8 °F (37.1 °C) (09/10/20 1630)  Pulse: 107 (09/10/20 1800)  Resp: (!) 34 (09/10/20 1800)  BP: (!) 101/59 (09/10/20 1800)  SpO2: 95 % (09/10/20 1800) Vital Signs (24h Range):  Temp:  [97.4 °F (36.3 °C)-99.5 °F (37.5 °C)] 98.8 °F (37.1 °C)  Pulse:  [] 107  Resp:  [19-50] 34  SpO2:  [84 %-97 %] 95 %  BP: ()/() 101/59  Arterial Line BP:  (158-197)/() 191/80     Weight: 67 kg (147 lb 11.3 oz)  Body mass index is 29.83 kg/m².    Intake/Output - Last 3 Shifts       09/08 0700 - 09/09 0659 09/09 0700 - 09/10 0659 09/10 0700 - 09/11 0659    I.V. (mL/kg) 1018.5 (17.6) 631 (9.4) 599 (8.9)    NG/ 1040 1100    IV Piggyback   500    Total Intake(mL/kg) 1268.5 (21.9) 1671 (24.9) 2199 (32.8)    Urine (mL/kg/hr) 480 (0.3) 8655 (5.4) 1140 (1.4)    Drains       Stool   0    Total Output 480 8655 1140    Net +788.5 -6984 +1059           Stool Occurrence   1 x          Physical Exam  Constitutional:       General: She is not in acute distress.  Cardiovascular:      Rate and Rhythm: Normal rate.   Abdominal:      General: There is no distension.      Palpations: Abdomen is soft.      Tenderness: There is no abdominal tenderness.      Comments: G tube   Exlap incision c/d/i   Skin:     General: Skin is warm and dry.         Significant Labs:  CBC:   Recent Labs   Lab 09/10/20  0250 09/10/20  1110   WBC 17.50*  --    RBC 3.53*  --    HGB 10.2*  --    HCT 30.8* 29*   *  --    MCV 87  --    MCH 28.9  --    MCHC 33.1  --      CMP:   Recent Labs   Lab 09/10/20  1441 09/10/20  1744   *  --    CALCIUM 7.6*  --    ALBUMIN  --  2.0*   PROT  --  4.5*   *  --    K 3.8  --    CO2 29  --    *  --    BUN 62*  --    CREATININE 1.7*  --    ALKPHOS  --  196*   ALT  --  119*   AST  --  218*   BILITOT  --  1.6*       Significant Diagnostics:  I have reviewed all pertinent imaging results/findings within the past 24 hours.

## 2020-09-10 NOTE — SUBJECTIVE & OBJECTIVE
Interval History: Patient seen and examined at bedside, voided 8.9 liters of urine over the last 24 hours. Lasix discontinued. Cr improving remarkably.     Review of patient's allergies indicates:   Allergen Reactions    Codeine Itching     Other reaction(s): Itching    Lipitor [atorvastatin] Other (See Comments)     Other reaction(s): Muscle pain  Muscle cranmps    Morphine Itching     Other reaction(s): nausea and vomiting     Zoloft [sertraline] Other (See Comments)     Tremors/muscle spasms     Current Facility-Administered Medications   Medication Frequency    amLODIPine benzoate 1 mg/mL oral suspension 5 mg Daily    calcium gluconate 1g in dextrose 5% 100mL (ready to mix system) PRN    calcium gluconate 2 g in dextrose 5 % 100 mL IVPB PRN    calcium gluconate 3 g in dextrose 5 % 100 mL IVPB PRN    dexmedetomidine (PRECEDEX) 400mcg/100mL 0.9% NaCL infusion Continuous    dextrose 50% injection 12.5 g PRN    dextrose 50% injection 25 g PRN    glucagon (human recombinant) injection 1 mg PRN    glucose chewable tablet 16 g PRN    glucose chewable tablet 24 g PRN    heparin (porcine) injection 5,000 Units Q8H    hydrALAZINE injection 10 mg Q8H PRN    labetalol 20 mg/4 mL (5 mg/mL) IV syring Q6H PRN    lactulose 20 gram/30 mL solution Soln 20 g TID    levothyroxine tablet 75 mcg Daily    magnesium sulfate 2g in water 50mL IVPB (premix) PRN    magnesium sulfate 2g in water 50mL IVPB (premix) PRN    melatonin tablet 6 mg Nightly PRN    nitroGLYCERIN 2% TD oint ointment 0.5 inch Q6H    ondansetron injection 4 mg Q8H PRN    oxyCODONE-acetaminophen 7.5-325 mg per tablet 1 tablet Q6H PRN    [START ON 9/11/2020] pantoprazole suspension 40 mg Daily    potassium chloride 40 mEq in 100 mL IVPB (FOR CENTRAL LINE ADMINISTRATION ONLY) PRN    And    potassium chloride 20 mEq in 100 mL IVPB (FOR CENTRAL LINE ADMINISTRATION ONLY) PRN    And    potassium chloride 40 mEq in 100 mL IVPB (FOR CENTRAL LINE  ADMINISTRATION ONLY) PRN    promethazine tablet 25 mg Q6H PRN    QUEtiapine tablet 100 mg BID    sodium chloride 0.9% flush 10 mL PRN    sodium chloride 0.9% flush 10 mL PRN    sodium phosphate 15 mmol in dextrose 5 % 250 mL IVPB PRN    sodium phosphate 20.01 mmol in dextrose 5 % 250 mL IVPB PRN    sodium phosphate 30 mmol in dextrose 5 % 250 mL IVPB PRN    tacrolimus (PROGRAF) 1 mg/mL oral syringe BID       Objective:     Vital Signs (Most Recent):  Temp: 98.4 °F (36.9 °C) (09/10/20 1200)  Pulse: 98 (09/10/20 1200)  Resp: (!) 30 (09/10/20 1200)  BP: (!) 99/58 (09/10/20 1200)  SpO2: (!) 94 % (09/10/20 1200)  O2 Device (Oxygen Therapy): nasal cannula (09/10/20 1200) Vital Signs (24h Range):  Temp:  [97.4 °F (36.3 °C)-99.5 °F (37.5 °C)] 98.4 °F (36.9 °C)  Pulse:  [] 98  Resp:  [19-50] 30  SpO2:  [84 %-97 %] 94 %  BP: ()/() 99/58  Arterial Line BP: (153-197)/() 191/80     Weight: 67 kg (147 lb 11.3 oz) (09/10/20 0500)  Body mass index is 29.83 kg/m².  Body surface area is 1.67 meters squared.    I/O last 3 completed shifts:  In: 2438.4 [I.V.:1253.4; NG/GT:1185]  Out: 9320 [Urine:9320]    Physical Exam  Constitutional:       General: She is not in acute distress.  Cardiovascular:      Rate and Rhythm: Normal rate.   Abdominal:      General: There is no distension.      Palpations: Abdomen is soft.      Tenderness: There is no abdominal tenderness.      Comments: G tube    Skin:     General: Skin is warm and dry.         Significant Labs:  CBC:   Recent Labs   Lab 09/10/20  0250 09/10/20  1110   WBC 17.50*  --    RBC 3.53*  --    HGB 10.2*  --    HCT 30.8* 29*   *  --    MCV 87  --    MCH 28.9  --    MCHC 33.1  --      CMP:   Recent Labs   Lab 09/10/20  1054   *   CALCIUM 8.0*   ALBUMIN 2.5*   PROT 5.2*   *   K 2.3*   CO2 29      BUN 58*   CREATININE 1.7*   ALKPHOS 251*   *   *   BILITOT 2.3*     All labs within the past 24 hours have been reviewed.      Significant Imaging:  Labs: Reviewed

## 2020-09-10 NOTE — ASSESSMENT & PLAN NOTE
JACQUE most likely secondary to hypoperfusion, likely ATN.  Pt also with non anion gap metabolic acidosis with respiratory compensation  Given Lasix 150mg IV x1 to poor UOP on 09/07    - No emergent need for dialysis today  - UOP 1.1 L/24 hrs, markedly decreased from yesterday  - received 1.5 liters of LR yesterday  - lasix discontinued, last dose at 2 pm on 09/09  - Cr downtrend: 3.0-->3.2-->2.2-->1.7-->1.6  - on FWB at 250cc q 6 hrs for free water supplementation   - Strict I/O and chart   - Renally dose all medications   - Avoid nephrotoxic medications, NSAIDs, IV contrast, ACE/ARB.  - Maintain MAP > 65  - Hb > 7 gm/dL

## 2020-09-10 NOTE — PROGRESS NOTES
[facility]  General Surgery  Progress Note    Subjective:     History of Present Illness:  Ms. Hernandez is a 51yo F w/PMH of von Gierke disease s/p liver transplant (2002, on tacro + MMF, follows Dr. Nielsen), hx chronic rejection (bx 2015 with acute and chronic rejection s/p steroids), biliary stricture and ductopenic rejection, recently with cirrhosis on fibroscan (10/2019), HTN, and depression who presents as a transfer for further evaluation of gastric outlet obstruction and esophageal stricturing.  Patient decompensated requiring multiple pressors and intubation. CT scan showed free air. Prior to intubation patient reported severe abdominal pain.   states that patient has had epigastric pain, nausea, and vomiting for several days.     Post-Op Info:  Procedure(s) (LRB):  LAPAROTOMY, EXPLORATORY with abd closure (N/A)   4 Days Post-Op     Interval History: No acute events overnight.  Much less agitated since starting seroquel. Precedex off. Persistent leukocytosis, but AF and HDS. 500cc bolus given this evening for soft BP.    Medications:  Continuous Infusions:   dexmedetomidine (PRECEDEX) infusion Stopped (09/10/20 1435)     Scheduled Meds:   amLODIPine benzoate  5 mg Per G Tube Daily    heparin (porcine)  5,000 Units Subcutaneous Q8H    lactulose  20 g Per G Tube TID    levothyroxine  75 mcg Per G Tube Daily    nitroGLYCERIN 2% TD oint  0.5 inch Topical (Top) Q6H    [START ON 9/11/2020] pantoprazole  40 mg Per G Tube Daily    QUEtiapine  100 mg Per G Tube BID    tacrolimus  1 mg Per G Tube BID     PRN Meds:calcium gluconate IVPB, calcium gluconate IVPB, calcium gluconate IVPB, dextrose 50%, dextrose 50%, glucagon (human recombinant), glucose, glucose, hydrALAZINE, labetaloL, magnesium sulfate IVPB, magnesium sulfate IVPB, melatonin, ondansetron, oxyCODONE-acetaminophen, potassium chloride in water **AND** potassium chloride in water **AND** potassium chloride in water,  promethazine, sodium chloride 0.9%, sodium chloride 0.9%, sodium phosphate IVPB, sodium phosphate IVPB, sodium phosphate IVPB     Review of patient's allergies indicates:   Allergen Reactions    Codeine Itching     Other reaction(s): Itching    Lipitor [atorvastatin] Other (See Comments)     Other reaction(s): Muscle pain  Muscle cranmps    Morphine Itching     Other reaction(s): nausea and vomiting     Zoloft [sertraline] Other (See Comments)     Tremors/muscle spasms     Objective:     Vital Signs (Most Recent):  Temp: 98.8 °F (37.1 °C) (09/10/20 1630)  Pulse: 107 (09/10/20 1800)  Resp: (!) 34 (09/10/20 1800)  BP: (!) 101/59 (09/10/20 1800)  SpO2: 95 % (09/10/20 1800) Vital Signs (24h Range):  Temp:  [97.4 °F (36.3 °C)-99.5 °F (37.5 °C)] 98.8 °F (37.1 °C)  Pulse:  [] 107  Resp:  [19-50] 34  SpO2:  [84 %-97 %] 95 %  BP: ()/() 101/59  Arterial Line BP: (158-197)/() 191/80     Weight: 67 kg (147 lb 11.3 oz)  Body mass index is 29.83 kg/m².    Intake/Output - Last 3 Shifts       09/08 0700 - 09/09 0659 09/09 0700 - 09/10 0659 09/10 0700 - 09/11 0659    I.V. (mL/kg) 1018.5 (17.6) 631 (9.4) 599 (8.9)    NG/ 1040 1100    IV Piggyback   500    Total Intake(mL/kg) 1268.5 (21.9) 1671 (24.9) 2199 (32.8)    Urine (mL/kg/hr) 480 (0.3) 8655 (5.4) 1140 (1.4)    Drains       Stool   0    Total Output 480 8655 1140    Net +788.5 -6984 +1059           Stool Occurrence   1 x          Physical Exam  Constitutional:       General: She is not in acute distress.  Cardiovascular:      Rate and Rhythm: Normal rate.   Abdominal:      General: There is no distension.      Palpations: Abdomen is soft.      Tenderness: There is no abdominal tenderness.      Comments: G tube   Exlap incision c/d/i   Skin:     General: Skin is warm and dry.         Significant Labs:  CBC:   Recent Labs   Lab 09/10/20  0250 09/10/20  1110   WBC 17.50*  --    RBC 3.53*  --    HGB 10.2*  --    HCT 30.8* 29*   *  --    MCV  87  --    MCH 28.9  --    MCHC 33.1  --      CMP:   Recent Labs   Lab 09/10/20  1441 09/10/20  1744   *  --    CALCIUM 7.6*  --    ALBUMIN  --  2.0*   PROT  --  4.5*   *  --    K 3.8  --    CO2 29  --    *  --    BUN 62*  --    CREATININE 1.7*  --    ALKPHOS  --  196*   ALT  --  119*   AST  --  218*   BILITOT  --  1.6*       Significant Diagnostics:  I have reviewed all pertinent imaging results/findings within the past 24 hours.    Assessment/Plan:     * Perforated abdominal viscus  50 y.o. female w/ free air on CT scan. S/p emergent ex lap on 9/4 w/ findings of gastric perforation, primary repair. Now 4 Days Post-Op from abdominal closure on 9/6, skin stapled closed, KERRY drains removed.     Continue SICU care  Follow up ammonia levels  Can start trickle feeds through the G tube  Follow renal and hepatic function         Constance Martin MD  General Surgery  [facility]

## 2020-09-10 NOTE — PROGRESS NOTES
[facility]  Nephrology  Progress Note    Patient Name: Elda Hernandez  MRN: [id number]  Admission Date: 9/3/2020  Hospital Length of Stay: 7 days  Attending Provider: Clark Rodriguez MD   Primary Care Physician: Jordana Woods MD  Principal Problem:Perforated abdominal viscus    Subjective:     HPI: 50 y.o. female that has a past medical history of Angiolipoma of kidney (10/1/2018), Arnold-Chiari malformation, Depression, Esophageal stricture, Essential tremor, Hypertension, Liver fibrosis, Osteoporosis, Recurrent urinary tract infection, Seizures, SIADH (syndrome of inappropriate ADH production), Squamous cell carcinoma (10/2014), and Von Gierke disease s/p liver transplant (2002, on tacro + MMF), HTN, and depression that presented to OSH due to worsening diffuse abdominal for 3 weeks, associated with nausea, vomiting and decreased PO intake. Abdominal CT scan showed gastric outlet narrowing, obstructive-related changes at pyloric-duodenal junction, and thickening of GE junction. EGD on 9/3 found with grade IV esophagitis with stricture. Pt transferred for GI evaluation and found with hypotension requiring vasopressors and lactic Acid of 3.9. Repeat CT showed decompressed abdomen and evidence of free air. Exploratory laparotomy performed and found with stomach perforation s/p washout, gastric perforation repair, and gastrostomy tube placement.  Nephrology consulted for JACQUE    Interval History: Patient seen and examined at bedside, voided 8.9 liters of urine over the last 24 hours. Lasix discontinued. Cr improving remarkably.     Review of patient's allergies indicates:   Allergen Reactions    Codeine Itching     Other reaction(s): Itching    Lipitor [atorvastatin] Other (See Comments)     Other reaction(s): Muscle pain  Muscle cranmps    Morphine Itching     Other reaction(s): nausea and vomiting     Zoloft [sertraline] Other (See Comments)     Tremors/muscle spasms     Current  Facility-Administered Medications   Medication Frequency    amLODIPine benzoate 1 mg/mL oral suspension 5 mg Daily    calcium gluconate 1g in dextrose 5% 100mL (ready to mix system) PRN    calcium gluconate 2 g in dextrose 5 % 100 mL IVPB PRN    calcium gluconate 3 g in dextrose 5 % 100 mL IVPB PRN    dexmedetomidine (PRECEDEX) 400mcg/100mL 0.9% NaCL infusion Continuous    dextrose 50% injection 12.5 g PRN    dextrose 50% injection 25 g PRN    glucagon (human recombinant) injection 1 mg PRN    glucose chewable tablet 16 g PRN    glucose chewable tablet 24 g PRN    heparin (porcine) injection 5,000 Units Q8H    hydrALAZINE injection 10 mg Q8H PRN    labetalol 20 mg/4 mL (5 mg/mL) IV syring Q6H PRN    lactulose 20 gram/30 mL solution Soln 20 g TID    levothyroxine tablet 75 mcg Daily    magnesium sulfate 2g in water 50mL IVPB (premix) PRN    magnesium sulfate 2g in water 50mL IVPB (premix) PRN    melatonin tablet 6 mg Nightly PRN    nitroGLYCERIN 2% TD oint ointment 0.5 inch Q6H    ondansetron injection 4 mg Q8H PRN    oxyCODONE-acetaminophen 7.5-325 mg per tablet 1 tablet Q6H PRN    [START ON 9/11/2020] pantoprazole suspension 40 mg Daily    potassium chloride 40 mEq in 100 mL IVPB (FOR CENTRAL LINE ADMINISTRATION ONLY) PRN    And    potassium chloride 20 mEq in 100 mL IVPB (FOR CENTRAL LINE ADMINISTRATION ONLY) PRN    And    potassium chloride 40 mEq in 100 mL IVPB (FOR CENTRAL LINE ADMINISTRATION ONLY) PRN    promethazine tablet 25 mg Q6H PRN    QUEtiapine tablet 100 mg BID    sodium chloride 0.9% flush 10 mL PRN    sodium chloride 0.9% flush 10 mL PRN    sodium phosphate 15 mmol in dextrose 5 % 250 mL IVPB PRN    sodium phosphate 20.01 mmol in dextrose 5 % 250 mL IVPB PRN    sodium phosphate 30 mmol in dextrose 5 % 250 mL IVPB PRN    tacrolimus (PROGRAF) 1 mg/mL oral syringe BID       Objective:     Vital Signs (Most Recent):  Temp: 98.4 °F (36.9 °C) (09/10/20 1200)  Pulse: 98  (09/10/20 1200)  Resp: (!) 30 (09/10/20 1200)  BP: (!) 99/58 (09/10/20 1200)  SpO2: (!) 94 % (09/10/20 1200)  O2 Device (Oxygen Therapy): nasal cannula (09/10/20 1200) Vital Signs (24h Range):  Temp:  [97.4 °F (36.3 °C)-99.5 °F (37.5 °C)] 98.4 °F (36.9 °C)  Pulse:  [] 98  Resp:  [19-50] 30  SpO2:  [84 %-97 %] 94 %  BP: ()/() 99/58  Arterial Line BP: (153-197)/() 191/80     Weight: 67 kg (147 lb 11.3 oz) (09/10/20 0500)  Body mass index is 29.83 kg/m².  Body surface area is 1.67 meters squared.    I/O last 3 completed shifts:  In: 2438.4 [I.V.:1253.4; NG/GT:1185]  Out: 9320 [Urine:9320]    Physical Exam  Constitutional:       General: She is not in acute distress.  Cardiovascular:      Rate and Rhythm: Normal rate.   Abdominal:      General: There is no distension.      Palpations: Abdomen is soft.      Tenderness: There is no abdominal tenderness.      Comments: G tube    Skin:     General: Skin is warm and dry.         Significant Labs:  CBC:   Recent Labs   Lab 09/10/20  0250 09/10/20  1110   WBC 17.50*  --    RBC 3.53*  --    HGB 10.2*  --    HCT 30.8* 29*   *  --    MCV 87  --    MCH 28.9  --    MCHC 33.1  --      CMP:   Recent Labs   Lab 09/10/20  1054   *   CALCIUM 8.0*   ALBUMIN 2.5*   PROT 5.2*   *   K 2.3*   CO2 29      BUN 58*   CREATININE 1.7*   ALKPHOS 251*   *   *   BILITOT 2.3*     All labs within the past 24 hours have been reviewed.     Significant Imaging:  Labs: Reviewed    Assessment/Plan:     JACQUE (acute kidney injury)  JACQUE most likely secondary to hypoperfusion, likely ATN.  Pt also with non anion gap metabolic acidosis with respiratory compensation  Given Lasix 150mg IV x1 to poor UOP on 09/07    - No emergent need for dialysis today  - UOP 8.9 L/24 hrs  - lasix discontinued, last dose at 2 pm on 09/09  - Cr downtrend: 3.0-->3.2-->2.2-->1.7  - please start FWB at 100cc q 2 hrs for free water supplementation   - Strict I/O and chart    - Renally dose all medications   - Avoid nephrotoxic medications, NSAIDs, IV contrast, ACE/ARB.  - Maintain MAP > 65  - Hb > 7 gm/dL        Hypernatremia  Please start FWB At 100cc q 2 hours. If Na worsens on repeat BMP consider Dextrose infusion to decrease sodium    Hypokalemia  2.3 on latest BMP  Replenish aggresively with IV and po supplements  Repeat BMP to document resolution    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  Management as per Hepatology   On Prograf, monitor daily levels       Thank you for your consult. I will follow-up with patient. Please contact us if you have any additional questions.    Al Shea MD  Nephrology  [facility]

## 2020-09-10 NOTE — TREATMENT PLAN
Hepatology Treatment Plan    Elda Hernandez is a 50 y.o. female admitted to hospital 9/3/2020 (Hospital Day: 8) due to Perforated abdominal viscus.     Interval History  On PO lactulose, no BMs recorded.  Cr improving with lasix    Objective  Temp:  [97.4 °F (36.3 °C)-99.5 °F (37.5 °C)] 98.4 °F (36.9 °C) (09/10 1200)  Pulse:  [] 98 (09/10 1200)  BP: ()/() 99/58 (09/10 1200)  Resp:  [19-50] 30 (09/10 1200)  SpO2:  [84 %-97 %] 94 % (09/10 1200)  Arterial Line BP: (153-197)/() 191/80 (09/09 2000)    Laboratory    MELD-Na score: 18 at 9/10/2020 10:54 AM  MELD score: 18 at 9/10/2020 10:54 AM  Calculated from:  Serum Creatinine: 1.7 mg/dL at 9/10/2020 10:54 AM  Serum Sodium: 151 mmol/L (Rounded to 137 mmol/L) at 9/10/2020 10:54 AM  Total Bilirubin: 2.3 mg/dL at 9/10/2020 10:54 AM  INR(ratio): 1.3 at 9/10/2020  2:50 AM  Age: 50 years 3 months    Recent Labs   Lab 09/09/20  2041 09/10/20  0009 09/10/20  0250   HGB 10.5* 10.2* 10.2*       Lab Results   Component Value Date    WBC 17.50 (H) 09/10/2020    HGB 10.2 (L) 09/10/2020    HCT 29 (L) 09/10/2020    MCV 87 09/10/2020     (L) 09/10/2020       Lab Results   Component Value Date     (H) 09/10/2020    K 2.3 (LL) 09/10/2020     09/10/2020    CO2 29 09/10/2020    BUN 58 (H) 09/10/2020    CREATININE 1.7 (H) 09/10/2020    CALCIUM 8.0 (L) 09/10/2020    ANIONGAP 14 09/10/2020    ESTGFRAFRICA 39.4 (A) 09/10/2020    EGFRNONAA 34.2 (A) 09/10/2020       Lab Results   Component Value Date     (H) 09/10/2020     (H) 09/10/2020     (H) 04/07/2018    ALKPHOS 251 (H) 09/10/2020    BILITOT 2.3 (H) 09/10/2020       Lab Results   Component Value Date    INR 1.3 (H) 09/10/2020    INR 1.1 09/09/2020    INR 1.0 09/08/2020       Plan  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  Ms. Hernandez is a 49yo F w/PMH of von Gierke disease s/p liver transplant (2002, on tacro + MMF, follows Dr. Nielsen), hx chronic rejection (bx  2015 with acute and chronic rejection s/p steroids), biliary stricture and ductopenic rejection, recently with cirrhosis on fibroscan (10/2019), HTN, and depression who presents as a transfer for further evaluation of gastric outlet obstruction and esophageal stricturing.  EGD 9/3 at OSH with severe esophageal stricturing so transferred for GI evaluation.  Course complicated by gastric perforation s/p repair.  Hepatology following for immunosuppression management.     Since surgery, has been extubated but still encephalopathic.  Has had worsening liver enzymes with stable bili / AP. U/S liver transplant unremarkable. Restarted tacrolimus. Kidney function is improving.     Tacrolimus level is therapeutic  Home dose is tacrolimus 2mg AM / 1mg PM and cellcept 500mg bid.     - continue tacrolimus 1mg AM and 1mg PM  - hold cellcept in setting of sepsis  - Continue lactulose for encephalopathy with goal 3-4 BMs daily  - When patient more stable, will need a liver biopsy  - Check daily tacrolimus trough, CBC, CMP and INR    Thank you for involving us in the care of Elda Hernandez. Please call with any additional questions, concerns or changes in the patient's clinical status.    Js Matias MD  Gastroenterology Fellow, PGY V

## 2020-09-10 NOTE — PLAN OF CARE
"      SICU PLAN OF CARE NOTE    Dx: Perforated abdominal viscus    Shift Events: VSS throughout shift.  No acute events occurred.  A line removed.     Goals of Care: Will continue to monitor and maintain SBP <180.     Neuro: Moves All Extremities and Confused    Vital Signs: BP (!) 161/63   Pulse 105   Temp 97.5 °F (36.4 °C) (Axillary)   Resp (!) 37   Ht 4' 11" (1.499 m)   Wt 67 kg (147 lb 11.3 oz)   SpO2 (!) 93%   BMI 29.83 kg/m²     Respiratory: Nasal Cannula 2L     Diet: NPO and Tube Feeds    Gtts: Precedex    Urine Output: Urinary Catheter 4550 cc/shift    Drains: None    Restraints Need for restraints frequently reassessed.  Pt repositioned frequently. No injuries noted.  Will continue to monitor and remove when appropriate.      Labs/Accuchecks: Labs monitored and replaced as needed. Accuchecks Q4H.     Skin: See flowsheets. Pt repositioned frequently using pillows.  Specialty bed plugged in and working properly.  Heel and sacral foams in place.  Tubing and devices kept free from skin.        "

## 2020-09-10 NOTE — ASSESSMENT & PLAN NOTE
JACQUE most likely secondary to hypoperfusion, likely ATN.  Pt also with non anion gap metabolic acidosis with respiratory compensation  Given Lasix 150mg IV x1 to poor UOP on 09/07    - No emergent need for dialysis today  - UOP 8.9 L/24 hrs  - lasix discontinued, last dose at 2 pm on 09/09  - Cr downtrend: 3.0-->3.2-->2.2-->1.7  - please start FWB at 100cc q 2 hrs for free water supplementation   - Strict I/O and chart   - Renally dose all medications   - Avoid nephrotoxic medications, NSAIDs, IV contrast, ACE/ARB.  - Maintain MAP > 65  - Hb > 7 gm/dL

## 2020-09-10 NOTE — NURSING
Called Dr. Gutierrez to bedside as appears lead V1 patient in VT. STAT EKG collected, and ST with BBB. Lytes on iSTAT collected and K =2.4. Orders received.

## 2020-09-10 NOTE — ASSESSMENT & PLAN NOTE
Neuro:  -sedation: precedex  -analgesia: PRN oxy   -continue seroquel       CV:   - heparin gtt discontinued 9/9.   - HDS off of pressors    Pulm:  - 2L NC     FEN/GI: Perforated stomach; s/p washout and patch (9/4); abdominal closure (9/6)  - s/p washout and closure on 9/6   - Impact peptide tube feeds  - Pantoprazole 40mg IV daily  - Daily metabolic panel with PRN repletion of electrolytes  - Lactulose 20g TID; no bowel movements in 20hours.  Will consider rectal admin  - repeat ammonia  - pantoprazole for ulcer ppx     Renal: Pt with JACQUE upon arrival; BUN/Cr: 32/2.1 (9/4)  - patient with oliguria, anuria over last 24hrs  - JACQUE improved overnight  BUN/Cr - 66/2.2  - Continue to monitor with daily metabolic labs  - nephrology on board, appreciating recs     HEME/ID:   - 9/5: 2 U pRBC given with appropriate response  - Hb dropped overnight 2/2 pt removing her femoral A-line; stable @ 10.2 currently  - daily CBC  - s/p Liver transplant in 2002; Daily tacrolimus level with AM labs. Per hepatology, restart tacrolimus 1mg BID  - Consult ID  - Lovenox for DVT ppx     Endo:  - Hx of hypothyroidism; not taking synthroid at home    Dispo:  - Continue SICU care

## 2020-09-10 NOTE — NURSING
Notified TALI Gutierrez MD that BP soft, SBP 80s MAP low 60s. Precedex d/c'd at 1435. Patient arousable and following commands but a little more lethargic than before. Will continue to monitor.

## 2020-09-10 NOTE — SUBJECTIVE & OBJECTIVE
Interval History/Significant Events: Pt somewhat agitated overnight.  Precedex increased.  She pulled out her femoral arterial line overnight as well.  There was copious bleeding at first that was controlled by holding pressure at site for >15min.  Hematoma near L inguinal ligament.    Follow-up For: Procedure(s) (LRB):  LAPAROTOMY, EXPLORATORY with abd closure (N/A)    Post-Operative Day: 4 Days Post-Op    Objective:     Vital Signs (Most Recent):  Temp: 97.5 °F (36.4 °C) (09/10/20 0300)  Pulse: 105 (09/10/20 0615)  Resp: (!) 37 (09/10/20 0615)  BP: (!) 161/63 (09/10/20 0615)  SpO2: (!) 93 % (09/10/20 0615) Vital Signs (24h Range):  Temp:  [97.4 °F (36.3 °C)-98.7 °F (37.1 °C)] 97.5 °F (36.4 °C)  Pulse:  [] 105  Resp:  [20-50] 37  SpO2:  [86 %-97 %] 93 %  BP: (103-194)/() 161/63  Arterial Line BP: (153-208)/() 191/80     Weight: 67 kg (147 lb 11.3 oz)  Body mass index is 29.83 kg/m².      Intake/Output Summary (Last 24 hours) at 9/10/2020 0628  Last data filed at 9/10/2020 0600  Gross per 24 hour   Intake 1671 ml   Output 8655 ml   Net -6984 ml       Physical Exam  Vitals signs and nursing note reviewed.   Constitutional:       General: She is not in acute distress.  HENT:      Head: Normocephalic and atraumatic.      Nose: Nose normal.      Mouth/Throat:      Mouth: Mucous membranes are moist.   Eyes:      Extraocular Movements: Extraocular movements intact.   Neck:      Musculoskeletal: Normal range of motion.      Comments: [facility] central line  Cardiovascular:      Rate and Rhythm: Regular rhythm. Tachycardia present.      Heart sounds: Normal heart sounds.   Pulmonary:      Breath sounds: Normal breath sounds. No wheezing.   Abdominal:      Comments: Midline incision with dressing in place; dressing dry, intact with old blood stain; G tube in place   Musculoskeletal: Normal range of motion.   Skin:     General: Skin is warm.      Comments: L peringuinal hematoma.  LLE pulses 2+.   Neurological:       General: No focal deficit present.      Mental Status: She is alert.      Comments: AAOx2         Vents:  Vent Mode: Spont/T (09/08/20 1607)  Ventilator Initiated: Yes (09/04/20 1130)  Set Rate: 14 BPM (09/08/20 1142)  Vt Set: 330 mL (09/08/20 1142)  Pressure Support: 5 cmH20 (09/08/20 1607)  PEEP/CPAP: 5 cmH20 (09/08/20 1607)  Oxygen Concentration (%): 40 (09/08/20 1142)  Peak Airway Pressure: 21 cmH2O (09/08/20 1142)  Plateau Pressure: 14 cmH20 (09/08/20 0850)  Total Ve: 4.54 mL (09/08/20 1142)  Negative Inspiratory Force (cm H2O): -35 (09/08/20 1607)  F/VT Ratio<105 (RSBI): (!) 43.21 (09/08/20 1142)    Lines/Drains/Airways     Central Venous Catheter Line            Percutaneous Central Line Insertion/Assessment - Triple Lumen  09/04/20 0800 right internal jugular 5 days          Drain                 Gastrostomy/Enterostomy 09/04/20 LUQ decompression 6 days         Urethral Catheter 09/04/20 1300 Non-latex;Straight-tip 18 Fr. 5 days          Peripheral Intravenous Line                 Peripheral IV - Single Lumen 09/08/20 1525 22 G Left Antecubital 1 day                Significant Labs:    CBC/Anemia Profile:  Recent Labs   Lab 09/09/20  2041 09/10/20  0009 09/10/20  0250   WBC 16.20* 17.94* 17.50*   HGB 10.5* 10.2* 10.2*   HCT 31.7* 30.6* 30.8*   * 105* 109*   MCV 86 88 87   RDW 15.7* 15.8* 15.6*        Chemistries:  Recent Labs   Lab 09/09/20  0321  09/09/20  1814 09/10/20  0009 09/10/20  0250     --   --   --  149*   K 3.7  --   --   --  2.4*     --   --   --  107   CO2 15*  --   --   --  27   BUN 71*  --   --   --  66*   CREATININE 3.2*  --   --   --  2.2*   CALCIUM 7.9*  --   --   --  8.0*   ALBUMIN  --    < > 2.5* 2.5* 2.6*   PROT  --    < > 5.2* 5.3* 5.3*   BILITOT  --    < > 2.2* 2.1* 2.2*   ALKPHOS  --    < > 259* 262* 264*   ALT  --    < > 182* 179* 177*   AST  --    < > 581* 517* 490*    < > = values in this interval not displayed.       All pertinent labs within the past 24 hours  have been reviewed.    Significant Imaging:  I have reviewed all pertinent imaging results/findings within the past 24 hours.

## 2020-09-10 NOTE — ASSESSMENT & PLAN NOTE
50 y.o. female w/ free air on CT scan. S/p emergent ex lap on 9/4 w/ findings of gastric perforation, primary repair. Now 4 Days Post-Op from abdominal closure on 9/6, skin stapled closed, KERRY drains removed.     Continue SICU care  Follow up ammonia levels  Can start trickle feeds through the G tube  Follow renal and hepatic function

## 2020-09-10 NOTE — NURSING
Notified Dr. Gutierrez that K on BMP >6; however, last lab results K = 2.4. Per marija ARTHUR to send STAT redraw. Will continue to monitor.

## 2020-09-11 LAB
ALBUMIN SERPL BCP-MCNC: 1.9 G/DL (ref 3.5–5.2)
ALBUMIN SERPL BCP-MCNC: 2 G/DL (ref 3.5–5.2)
ALBUMIN SERPL BCP-MCNC: 2.1 G/DL (ref 3.5–5.2)
ALLENS TEST: ABNORMAL
ALP SERPL-CCNC: 196 U/L (ref 55–135)
ALP SERPL-CCNC: 196 U/L (ref 55–135)
ALP SERPL-CCNC: 208 U/L (ref 55–135)
ALT SERPL W/O P-5'-P-CCNC: 112 U/L (ref 10–44)
ALT SERPL W/O P-5'-P-CCNC: 93 U/L (ref 10–44)
ALT SERPL W/O P-5'-P-CCNC: 98 U/L (ref 10–44)
ANION GAP SERPL CALC-SCNC: 14 MMOL/L (ref 8–16)
ANISOCYTOSIS BLD QL SMEAR: SLIGHT
APTT BLDCRRT: 34.1 SEC (ref 21–32)
AST SERPL-CCNC: 125 U/L (ref 10–40)
AST SERPL-CCNC: 136 U/L (ref 10–40)
AST SERPL-CCNC: 155 U/L (ref 10–40)
BASO STIPL BLD QL SMEAR: ABNORMAL
BASOPHILS # BLD AUTO: ABNORMAL K/UL (ref 0–0.2)
BASOPHILS NFR BLD: 0 % (ref 0–1.9)
BILIRUB DIRECT SERPL-MCNC: 1.6 MG/DL (ref 0.1–0.3)
BILIRUB DIRECT SERPL-MCNC: 1.7 MG/DL (ref 0.1–0.3)
BILIRUB DIRECT SERPL-MCNC: 1.7 MG/DL (ref 0.1–0.3)
BILIRUB SERPL-MCNC: 1.9 MG/DL (ref 0.1–1)
BILIRUB SERPL-MCNC: 2.1 MG/DL (ref 0.1–1)
BILIRUB SERPL-MCNC: 2.1 MG/DL (ref 0.1–1)
BUN SERPL-MCNC: 69 MG/DL (ref 6–20)
CALCIUM SERPL-MCNC: 7.8 MG/DL (ref 8.7–10.5)
CHLORIDE SERPL-SCNC: 110 MMOL/L (ref 95–110)
CO2 SERPL-SCNC: 26 MMOL/L (ref 23–29)
CREAT SERPL-MCNC: 1.6 MG/DL (ref 0.5–1.4)
DELSYS: ABNORMAL
DIFFERENTIAL METHOD: ABNORMAL
EOSINOPHIL # BLD AUTO: ABNORMAL K/UL (ref 0–0.5)
EOSINOPHIL NFR BLD: 0 % (ref 0–8)
ERYTHROCYTE [DISTWIDTH] IN BLOOD BY AUTOMATED COUNT: 16 % (ref 11.5–14.5)
EST. GFR  (AFRICAN AMERICAN): 42.4 ML/MIN/1.73 M^2
EST. GFR  (NON AFRICAN AMERICAN): 36.8 ML/MIN/1.73 M^2
FLOW: 5
GLUCOSE SERPL-MCNC: 186 MG/DL (ref 70–110)
HCO3 UR-SCNC: 22.6 MMOL/L (ref 24–28)
HCT VFR BLD AUTO: 31.1 % (ref 37–48.5)
HGB BLD-MCNC: 10.2 G/DL (ref 12–16)
IMM GRANULOCYTES # BLD AUTO: ABNORMAL K/UL (ref 0–0.04)
IMM GRANULOCYTES NFR BLD AUTO: ABNORMAL % (ref 0–0.5)
INR PPP: 1.6 (ref 0.8–1.2)
LYMPHOCYTES # BLD AUTO: ABNORMAL K/UL (ref 1–4.8)
LYMPHOCYTES NFR BLD: 1.5 % (ref 18–48)
MAGNESIUM SERPL-MCNC: 2.4 MG/DL (ref 1.6–2.6)
MCH RBC QN AUTO: 29.4 PG (ref 27–31)
MCHC RBC AUTO-ENTMCNC: 32.8 G/DL (ref 32–36)
MCV RBC AUTO: 90 FL (ref 82–98)
METAMYELOCYTES NFR BLD MANUAL: 0.5 %
MODE: ABNORMAL
MONOCYTES # BLD AUTO: ABNORMAL K/UL (ref 0.3–1)
MONOCYTES NFR BLD: 3.5 % (ref 4–15)
MYELOCYTES NFR BLD MANUAL: 0.5 %
NEUTROPHILS NFR BLD: 92 % (ref 38–73)
NEUTS BAND NFR BLD MANUAL: 2 %
NRBC BLD-RTO: 1 /100 WBC
PCO2 BLDA: 23.7 MMHG (ref 35–45)
PH SMN: 7.59 [PH] (ref 7.35–7.45)
PHOSPHATE SERPL-MCNC: 2.5 MG/DL (ref 2.7–4.5)
PLATELET # BLD AUTO: 146 K/UL (ref 150–350)
PLATELET BLD QL SMEAR: ABNORMAL
PMV BLD AUTO: 12.1 FL (ref 9.2–12.9)
PO2 BLDA: 66 MMHG (ref 80–100)
POC BE: 1 MMOL/L
POC SATURATED O2: 96 % (ref 95–100)
POC TCO2: 23 MMOL/L (ref 23–27)
POCT GLUCOSE: 132 MG/DL (ref 70–110)
POCT GLUCOSE: 137 MG/DL (ref 70–110)
POCT GLUCOSE: 190 MG/DL (ref 70–110)
POTASSIUM SERPL-SCNC: 2.9 MMOL/L (ref 3.5–5.1)
POTASSIUM SERPL-SCNC: 3.2 MMOL/L (ref 3.5–5.1)
PROT SERPL-MCNC: 4.9 G/DL (ref 6–8.4)
PROT SERPL-MCNC: 5 G/DL (ref 6–8.4)
PROT SERPL-MCNC: 5 G/DL (ref 6–8.4)
PROTHROMBIN TIME: 17.1 SEC (ref 9–12.5)
RBC # BLD AUTO: 3.47 M/UL (ref 4–5.4)
SAMPLE: ABNORMAL
SITE: ABNORMAL
SODIUM SERPL-SCNC: 150 MMOL/L (ref 136–145)
SP02: 91
TACROLIMUS BLD-MCNC: 7 NG/ML (ref 5–15)
TOXIC GRANULES BLD QL SMEAR: PRESENT
WBC # BLD AUTO: 39.09 K/UL (ref 3.9–12.7)
WBC TOXIC VACUOLES BLD QL SMEAR: PRESENT

## 2020-09-11 PROCEDURE — 20000000 HC ICU ROOM

## 2020-09-11 PROCEDURE — 99291 PR CRITICAL CARE, E/M 30-74 MINUTES: ICD-10-PCS | Mod: 24,,, | Performed by: SURGERY

## 2020-09-11 PROCEDURE — 82803 BLOOD GASES ANY COMBINATION: CPT

## 2020-09-11 PROCEDURE — 84132 ASSAY OF SERUM POTASSIUM: CPT

## 2020-09-11 PROCEDURE — 25000003 PHARM REV CODE 250: Performed by: STUDENT IN AN ORGANIZED HEALTH CARE EDUCATION/TRAINING PROGRAM

## 2020-09-11 PROCEDURE — 63600175 PHARM REV CODE 636 W HCPCS: Performed by: SURGERY

## 2020-09-11 PROCEDURE — 99900035 HC TECH TIME PER 15 MIN (STAT)

## 2020-09-11 PROCEDURE — 80076 HEPATIC FUNCTION PANEL: CPT

## 2020-09-11 PROCEDURE — 25000242 PHARM REV CODE 250 ALT 637 W/ HCPCS: Performed by: SURGERY

## 2020-09-11 PROCEDURE — 85027 COMPLETE CBC AUTOMATED: CPT

## 2020-09-11 PROCEDURE — C9113 INJ PANTOPRAZOLE SODIUM, VIA: HCPCS | Performed by: STUDENT IN AN ORGANIZED HEALTH CARE EDUCATION/TRAINING PROGRAM

## 2020-09-11 PROCEDURE — 85730 THROMBOPLASTIN TIME PARTIAL: CPT

## 2020-09-11 PROCEDURE — 85007 BL SMEAR W/DIFF WBC COUNT: CPT

## 2020-09-11 PROCEDURE — 27000221 HC OXYGEN, UP TO 24 HOURS

## 2020-09-11 PROCEDURE — 25500020 PHARM REV CODE 255: Performed by: STUDENT IN AN ORGANIZED HEALTH CARE EDUCATION/TRAINING PROGRAM

## 2020-09-11 PROCEDURE — 63600175 PHARM REV CODE 636 W HCPCS: Performed by: STUDENT IN AN ORGANIZED HEALTH CARE EDUCATION/TRAINING PROGRAM

## 2020-09-11 PROCEDURE — 99232 PR SUBSEQUENT HOSPITAL CARE,LEVL II: ICD-10-PCS | Mod: ,,, | Performed by: INTERNAL MEDICINE

## 2020-09-11 PROCEDURE — 83735 ASSAY OF MAGNESIUM: CPT

## 2020-09-11 PROCEDURE — 25000003 PHARM REV CODE 250: Performed by: SURGERY

## 2020-09-11 PROCEDURE — 87205 SMEAR GRAM STAIN: CPT

## 2020-09-11 PROCEDURE — 85610 PROTHROMBIN TIME: CPT

## 2020-09-11 PROCEDURE — 87106 FUNGI IDENTIFICATION YEAST: CPT

## 2020-09-11 PROCEDURE — 80048 BASIC METABOLIC PNL TOTAL CA: CPT

## 2020-09-11 PROCEDURE — 87070 CULTURE OTHR SPECIMN AEROBIC: CPT

## 2020-09-11 PROCEDURE — 94761 N-INVAS EAR/PLS OXIMETRY MLT: CPT

## 2020-09-11 PROCEDURE — 36600 WITHDRAWAL OF ARTERIAL BLOOD: CPT

## 2020-09-11 PROCEDURE — 99900026 HC AIRWAY MAINTENANCE (STAT)

## 2020-09-11 PROCEDURE — 99291 CRITICAL CARE FIRST HOUR: CPT | Mod: 24,,, | Performed by: SURGERY

## 2020-09-11 PROCEDURE — 84100 ASSAY OF PHOSPHORUS: CPT

## 2020-09-11 PROCEDURE — 80076 HEPATIC FUNCTION PANEL: CPT | Mod: 91

## 2020-09-11 PROCEDURE — 31720 CLEARANCE OF AIRWAYS: CPT

## 2020-09-11 PROCEDURE — 87040 BLOOD CULTURE FOR BACTERIA: CPT

## 2020-09-11 PROCEDURE — 99232 SBSQ HOSP IP/OBS MODERATE 35: CPT | Mod: ,,, | Performed by: INTERNAL MEDICINE

## 2020-09-11 PROCEDURE — 80197 ASSAY OF TACROLIMUS: CPT

## 2020-09-11 PROCEDURE — 94640 AIRWAY INHALATION TREATMENT: CPT

## 2020-09-11 RX ORDER — HYDROMORPHONE HYDROCHLORIDE 1 MG/ML
1 INJECTION, SOLUTION INTRAMUSCULAR; INTRAVENOUS; SUBCUTANEOUS EVERY 6 HOURS PRN
Status: DISCONTINUED | OUTPATIENT
Start: 2020-09-11 | End: 2020-09-14

## 2020-09-11 RX ORDER — IPRATROPIUM BROMIDE AND ALBUTEROL SULFATE 2.5; .5 MG/3ML; MG/3ML
3 SOLUTION RESPIRATORY (INHALATION) EVERY 4 HOURS
Status: DISCONTINUED | OUTPATIENT
Start: 2020-09-11 | End: 2020-09-15

## 2020-09-11 RX ORDER — GLUCAGON 1 MG
1 KIT INJECTION
Status: DISCONTINUED | OUTPATIENT
Start: 2020-09-11 | End: 2020-09-11

## 2020-09-11 RX ORDER — INSULIN ASPART 100 [IU]/ML
1-10 INJECTION, SOLUTION INTRAVENOUS; SUBCUTANEOUS EVERY 4 HOURS PRN
Status: DISCONTINUED | OUTPATIENT
Start: 2020-09-11 | End: 2020-10-14 | Stop reason: HOSPADM

## 2020-09-11 RX ORDER — HYDROMORPHONE HYDROCHLORIDE 1 MG/ML
0.2 INJECTION, SOLUTION INTRAMUSCULAR; INTRAVENOUS; SUBCUTANEOUS ONCE
Status: CANCELLED | OUTPATIENT
Start: 2020-09-11

## 2020-09-11 RX ORDER — POTASSIUM CHLORIDE 1.5 G/1.58G
40 POWDER, FOR SOLUTION ORAL
Status: DISCONTINUED | OUTPATIENT
Start: 2020-09-11 | End: 2020-09-11

## 2020-09-11 RX ORDER — PANTOPRAZOLE SODIUM 40 MG/10ML
40 INJECTION, POWDER, LYOPHILIZED, FOR SOLUTION INTRAVENOUS DAILY
Status: DISCONTINUED | OUTPATIENT
Start: 2020-09-11 | End: 2020-09-16

## 2020-09-11 RX ORDER — HYDROMORPHONE HYDROCHLORIDE 1 MG/ML
0.5 INJECTION, SOLUTION INTRAMUSCULAR; INTRAVENOUS; SUBCUTANEOUS ONCE
Status: COMPLETED | OUTPATIENT
Start: 2020-09-11 | End: 2020-09-11

## 2020-09-11 RX ORDER — TACROLIMUS 0.5 MG/1
0.5 CAPSULE ORAL 2 TIMES DAILY
Status: DISCONTINUED | OUTPATIENT
Start: 2020-09-11 | End: 2020-09-13

## 2020-09-11 RX ORDER — DEXTROSE MONOHYDRATE 50 MG/ML
INJECTION, SOLUTION INTRAVENOUS CONTINUOUS
Status: ACTIVE | OUTPATIENT
Start: 2020-09-11 | End: 2020-09-11

## 2020-09-11 RX ORDER — INSULIN ASPART 100 [IU]/ML
0-5 INJECTION, SOLUTION INTRAVENOUS; SUBCUTANEOUS
Status: DISCONTINUED | OUTPATIENT
Start: 2020-09-11 | End: 2020-09-11

## 2020-09-11 RX ORDER — LACTULOSE 10 G/15ML
200 SOLUTION ORAL; RECTAL 3 TIMES DAILY
Status: DISCONTINUED | OUTPATIENT
Start: 2020-09-11 | End: 2020-09-14

## 2020-09-11 RX ADMIN — IPRATROPIUM BROMIDE AND ALBUTEROL SULFATE 3 ML: .5; 2.5 SOLUTION RESPIRATORY (INHALATION) at 07:09

## 2020-09-11 RX ADMIN — POTASSIUM CHLORIDE 80 MEQ: 29.8 INJECTION, SOLUTION INTRAVENOUS at 05:09

## 2020-09-11 RX ADMIN — HYDROMORPHONE HYDROCHLORIDE 0.5 MG: 1 INJECTION, SOLUTION INTRAMUSCULAR; INTRAVENOUS; SUBCUTANEOUS at 09:09

## 2020-09-11 RX ADMIN — LACTULOSE 200 G: 10 SOLUTION ORAL at 11:09

## 2020-09-11 RX ADMIN — HYDROMORPHONE HYDROCHLORIDE 1 MG: 1 INJECTION, SOLUTION INTRAMUSCULAR; INTRAVENOUS; SUBCUTANEOUS at 11:09

## 2020-09-11 RX ADMIN — NITROGLYCERIN 0.5 INCH: 20 OINTMENT TOPICAL at 06:09

## 2020-09-11 RX ADMIN — HEPARIN SODIUM 5000 UNITS: 5000 INJECTION INTRAVENOUS; SUBCUTANEOUS at 02:09

## 2020-09-11 RX ADMIN — HEPARIN SODIUM 5000 UNITS: 5000 INJECTION INTRAVENOUS; SUBCUTANEOUS at 10:09

## 2020-09-11 RX ADMIN — SODIUM PHOSPHATE, MONOBASIC, MONOHYDRATE 15 MMOL: 276; 142 INJECTION, SOLUTION INTRAVENOUS at 05:09

## 2020-09-11 RX ADMIN — IPRATROPIUM BROMIDE AND ALBUTEROL SULFATE 3 ML: .5; 2.5 SOLUTION RESPIRATORY (INHALATION) at 03:09

## 2020-09-11 RX ADMIN — TACROLIMUS 1 MG: 1 CAPSULE, GELATIN COATED ORAL at 08:09

## 2020-09-11 RX ADMIN — TACROLIMUS 0.5 MG: 0.5 CAPSULE ORAL at 06:09

## 2020-09-11 RX ADMIN — DEXTROSE: 5 SOLUTION INTRAVENOUS at 01:09

## 2020-09-11 RX ADMIN — HEPARIN SODIUM 5000 UNITS: 5000 INJECTION INTRAVENOUS; SUBCUTANEOUS at 05:09

## 2020-09-11 RX ADMIN — NITROGLYCERIN 0.5 INCH: 20 OINTMENT TOPICAL at 12:09

## 2020-09-11 RX ADMIN — NITROGLYCERIN 0.5 INCH: 20 OINTMENT TOPICAL at 05:09

## 2020-09-11 RX ADMIN — IOHEXOL 15 ML: 350 INJECTION, SOLUTION INTRAVENOUS at 08:09

## 2020-09-11 RX ADMIN — SODIUM CHLORIDE, SODIUM LACTATE, POTASSIUM CHLORIDE, AND CALCIUM CHLORIDE 500 ML: .6; .31; .03; .02 INJECTION, SOLUTION INTRAVENOUS at 12:09

## 2020-09-11 RX ADMIN — IOHEXOL 15 ML: 350 INJECTION, SOLUTION INTRAVENOUS at 09:09

## 2020-09-11 RX ADMIN — IPRATROPIUM BROMIDE AND ALBUTEROL SULFATE 3 ML: .5; 2.5 SOLUTION RESPIRATORY (INHALATION) at 11:09

## 2020-09-11 RX ADMIN — PANTOPRAZOLE SODIUM 40 MG: 40 INJECTION, POWDER, LYOPHILIZED, FOR SOLUTION INTRAVENOUS at 02:09

## 2020-09-11 NOTE — PLAN OF CARE
SW awaiting patient improvement to speak with her regarding her wishes for  to visit and make medical decision on her behalf and to speak with her regarding discharge planning.    SW sent email to on-call staff, as well, so that if patient is improved over the weekend, an on-call SW can meet with the patient.     Tigist Moody LMSW   - Case Management

## 2020-09-11 NOTE — SUBJECTIVE & OBJECTIVE
Interval History: Patient seen and examined at bedside, Urine output decreased , patient received 1.5 liters of Ringer's bolus. UOP at 1,085cc over the past 24 hours.     Review of patient's allergies indicates:   Allergen Reactions    Codeine Itching     Other reaction(s): Itching    Lipitor [atorvastatin] Other (See Comments)     Other reaction(s): Muscle pain  Muscle cranmps    Morphine Itching     Other reaction(s): nausea and vomiting     Zoloft [sertraline] Other (See Comments)     Tremors/muscle spasms     Current Facility-Administered Medications   Medication Frequency    calcium gluconate 1g in dextrose 5% 100mL (ready to mix system) PRN    calcium gluconate 2 g in dextrose 5 % 100 mL IVPB PRN    calcium gluconate 3 g in dextrose 5 % 100 mL IVPB PRN    dextrose 50% injection 12.5 g PRN    dextrose 50% injection 25 g PRN    glucagon (human recombinant) injection 1 mg PRN    glucose chewable tablet 16 g PRN    glucose chewable tablet 24 g PRN    heparin (porcine) injection 5,000 Units Q8H    hydrALAZINE injection 10 mg Q8H PRN    insulin aspart U-100 pen 1-10 Units Q4H PRN    iohexol (OMNIPAQUE) oral solution 15 mL PRN    labetalol 20 mg/4 mL (5 mg/mL) IV syring Q6H PRN    lactulose 20 gram/30 mL solution Soln 20 g TID    levothyroxine tablet 75 mcg Daily    magnesium sulfate 2g in water 50mL IVPB (premix) PRN    magnesium sulfate 2g in water 50mL IVPB (premix) PRN    melatonin tablet 6 mg Nightly PRN    nitroGLYCERIN 2% TD oint ointment 0.5 inch Q6H    ondansetron injection 4 mg Q8H PRN    oxyCODONE-acetaminophen 7.5-325 mg per tablet 1 tablet Q6H PRN    pantoprazole suspension 40 mg Daily    potassium chloride 40 mEq in 100 mL IVPB (FOR CENTRAL LINE ADMINISTRATION ONLY) PRN    And    potassium chloride 20 mEq in 100 mL IVPB (FOR CENTRAL LINE ADMINISTRATION ONLY) PRN    And    potassium chloride 40 mEq in 100 mL IVPB (FOR CENTRAL LINE ADMINISTRATION ONLY) PRN    promethazine  tablet 25 mg Q6H PRN    sodium chloride 0.9% flush 10 mL PRN    sodium chloride 0.9% flush 10 mL PRN    sodium phosphate 15 mmol in dextrose 5 % 250 mL IVPB PRN    sodium phosphate 20.01 mmol in dextrose 5 % 250 mL IVPB PRN    sodium phosphate 30 mmol in dextrose 5 % 250 mL IVPB PRN    tacrolimus (PROGRAF) 1 mg/mL oral syringe BID       Objective:     Vital Signs (Most Recent):  Temp: 98.5 °F (36.9 °C) (09/11/20 0300)  Pulse: (!) 117 (09/11/20 0600)  Resp: (!) 34 (09/11/20 0600)  BP: (!) 117/53 (09/11/20 0545)  SpO2: (!) 90 % (09/11/20 0600)  O2 Device (Oxygen Therapy): nasal cannula (09/11/20 0600) Vital Signs (24h Range):  Temp:  [98.4 °F (36.9 °C)-98.8 °F (37.1 °C)] 98.5 °F (36.9 °C)  Pulse:  [] 117  Resp:  [24-43] 34  SpO2:  [84 %-97 %] 90 %  BP: ()/(44-80) 117/53     Weight: 73 kg (160 lb 15 oz) (09/11/20 0500)  Body mass index is 32.51 kg/m².  Body surface area is 1.74 meters squared.    I/O last 3 completed shifts:  In: 5205 [I.V.:1195; NG/GT:2510; IV Piggyback:1500]  Out: 5335 [Urine:5335]    Physical Exam  Constitutional:       General: She is not in acute distress.  Cardiovascular:      Rate and Rhythm: Normal rate.   Abdominal:      General: There is no distension.      Palpations: Abdomen is soft.      Tenderness: There is no abdominal tenderness.      Comments: G tube    Skin:     General: Skin is warm and dry.         Significant Labs:  CBC:   Recent Labs   Lab 09/11/20 0321   WBC 39.09*   RBC 3.47*   HGB 10.2*   HCT 31.1*   *   MCV 90   MCH 29.4   MCHC 32.8     CMP:   Recent Labs   Lab 09/11/20 0321   *   CALCIUM 7.8*   ALBUMIN 2.1*   PROT 5.0*   *   K 2.9*   CO2 26      BUN 69*   CREATININE 1.6*   ALKPHOS 208*   *   *   BILITOT 2.1*     Coagulation:   Recent Labs   Lab 09/11/20  0321   INR 1.6*   APTT 34.1*     All labs within the past 24 hours have been reviewed.     Significant Imaging:  Labs: Reviewed

## 2020-09-11 NOTE — ASSESSMENT & PLAN NOTE
Neuro:  -sedation: none  -analgesia: PRN oxy   -discontinue Seroquel        CV:   - heparin gtt discontinued 9/9.   - HDS off of pressors    Pulm:  - 5L NC     FEN/GI: Perforated stomach; s/p washout and patch (9/4); abdominal closure (9/6)  - s/p washout and closure on 9/6   - Tube feeds held  - Daily metabolic panel with PRN repletion of electrolytes  - pantoprazole for ulcer ppx     Renal: Pt with JACQUE upon arrival; BUN/Cr: 32/2.1 (9/4)  - JACQUE improving  - Continue to monitor with daily metabolic labs  - nephrology on board, appreciating recs     HEME/ID:   - 9/5: 2 U pRBC given with appropriate response  - Hb stable  - Daily CBC  - s/p Liver transplant in 2002; Daily tacrolimus level with AM labs. Per hepatology, restart tacrolimus 1mg BID  - Consult ID  - Lovenox for DVT ppx     Endo:  - Hx of hypothyroidism; not taking synthroid at home    Dispo:  - Continue SICU care

## 2020-09-11 NOTE — PHYSICIAN QUERY
PT Name: Elda Hernandez  MR #: [id number]     ACUITY OF CONDITION CLARIFICATION      CDS/: Justyna Hayes RN, CDS               Contact information: [email or web address]    This form is a permanent document in the medical record.     Query Date: September 11, 2020    By submitting this query, we are merely seeking further clarification of documentation to reflect the severity of illness of your patient. Please utilize your independent clinical judgment when addressing the question(s) below.    The Medical record reflects the following:     Indicators   Supporting Clinical Findings Location in Medical Record   x Documentation of condition Remains confused.  Moving all extremities but not following commands reliably.  Precedex initiated for significant agitation this AM.  Ammonia 70 yesterday, will recheck today.  Starting lactulose for possible hepatic encephalopathy   Critical Care PN 9/9    x Lab Value(s) Ammonia: 48 --> 71 --> 61 --> 47     Has had worsening liver enzymes with stable bili / AP.   Labs 9/5- 9/10     Hepatology PN 9/9     Radiology Findings     x Treatment/Medication Start lactulose either PO or rectally for encephalopathy with goal 3-4 BMs daily    Lactulose 20g TID; no bowel movements in 20hours.  Will consider rectal admin   Hepatology PN 9/9       Critical Care PN 9/10    x Other 49yo F w/PMH of von Gierke disease s/p liver transplant (2002, on tacro + MMF, follows Dr. Nielsen), hx chronic rejection (bx 2015 with acute and chronic rejection s/p steroids), biliary stricture and ductopenic rejection, recently with cirrhosis on fibroscan (10/2019)     Ammonia improved without improvement in mental status    Mental status improving Surgery PN 9/9              Critical Care PN 9/10       Critical Care PN 9/11      Provider, please specify the acuity/chronicity of _Hepatic Encephalopathy__:    [ x  ] Acute   [   ] Subacute   [   ] Acute on chronic   [   ] Other, please specify ___________    [   ] Clinically Undetermined     Present on admission (POA) status:   [   ] Yes (Y)                           [   ] Clinically Undetermined (W)  [   ] No (N)                            [   ] Documentation insufficient to determine if condition is POA (U)       Please document in your progress notes daily for the duration of treatment until resolved, and include in your discharge summary.

## 2020-09-11 NOTE — PROGRESS NOTES
[facility]  Critical Care - Surgery  Progress Note    Patient Name: Elda Hernandez  MRN: [id number]  Admission Date: 9/3/2020  Hospital Length of Stay: 8 days  Code Status: Full Code  Attending Provider: Clark Rodriguez MD  Primary Care Provider: Jordana Woods MD   Principal Problem: Perforated abdominal viscus    Subjective:     Hospital/ICU Course:  9/4: Pt admitted to SICU following ex lap for GI perf. Intubated, sedated. Wound vac in place.  Soon after arrival to unit a mottled appearance to RUE was noted.  Vascular consulted and US revealed R radial artery thrombosis.    9/5: Tachycardic..  R arm appearance greatly improved overnight.    9/6: Patient returned to OR for abdominal washout, closure. R arm with dopplerable signals to ulnar artery and palmar arch.   9/7: Attempted to wean patient off of sedation in an effort to move towards extubation. Patient weaned off of propofol. Precedex started. Patient's heart rate very labile and sensitize to sedation.   9/8: patient extubated  9/9: SHANICE. Labetalol PRN for HTN   9/10:  Diuresis increased.  Cr downtrending  9/11: Increased abdominal distension and elevated WBC count. CT abd    Interval History/Significant Events: NAEO. Mental status improving; however patient with worsening abdominal distension and rising WBC count. Plan for CT abd today. Tube feeds held.    Follow-up For: Procedure(s) (LRB):  LAPAROTOMY, EXPLORATORY with abd closure (N/A)    Post-Operative Day: 5 Days Post-Op    Objective:     Vital Signs (Most Recent):  Temp: 98.5 °F (36.9 °C) (09/11/20 0700)  Pulse: (!) 114 (09/11/20 0900)  Resp: (!) 36 (09/11/20 0900)  BP: (!) 97/56 (09/11/20 0900)  SpO2: (!) 91 % (09/11/20 0900) Vital Signs (24h Range):  Temp:  [98.4 °F (36.9 °C)-98.8 °F (37.1 °C)] 98.5 °F (36.9 °C)  Pulse:  [] 114  Resp:  [24-43] 36  SpO2:  [84 %-98 %] 91 %  BP: ()/(44-74) 97/56     Weight: 73 kg (160 lb 15 oz)  Body mass index is 32.51  kg/m².      Intake/Output Summary (Last 24 hours) at 9/11/2020 0926  Last data filed at 9/11/2020 0900  Gross per 24 hour   Intake 4549 ml   Output 1030 ml   Net 3519 ml       Physical Exam  Vitals signs and nursing note reviewed.   Constitutional:       General: She is not in acute distress.  HENT:      Head: Normocephalic and atraumatic.      Nose: Nose normal.      Mouth/Throat:      Mouth: Mucous membranes are moist.   Eyes:      Extraocular Movements: Extraocular movements intact.   Neck:      Musculoskeletal: Normal range of motion.      Comments: RIJ central line  Cardiovascular:      Rate and Rhythm: Regular rhythm. Tachycardia present.      Heart sounds: Normal heart sounds.   Pulmonary:      Breath sounds: Normal breath sounds. No wheezing.   Abdominal:      Comments: Midline incision with dressing in place; dressing dry, intact with old blood stain; G tube in place   Musculoskeletal: Normal range of motion.   Skin:     General: Skin is warm.      Comments: L peringuinal hematoma.  LLE pulses 2+.   Neurological:      General: No focal deficit present.      Mental Status: She is alert.      Comments: AAOx2         Vents:      Lines/Drains/Airways     Central Venous Catheter Line            Percutaneous Central Line Insertion/Assessment - Triple Lumen  09/04/20 0800 right internal jugular 7 days          Drain                 Gastrostomy/Enterostomy 09/04/20 LUQ decompression 7 days         Urethral Catheter 09/04/20 1300 Non-latex;Straight-tip 18 Fr. 6 days                Significant Labs:    CBC/Anemia Profile:  Recent Labs   Lab 09/10/20  0009 09/10/20  0250 09/10/20  1110 09/11/20  0321   WBC 17.94* 17.50*  --  39.09*   HGB 10.2* 10.2*  --  10.2*   HCT 30.6* 30.8* 29* 31.1*   * 109*  --  146*   MCV 88 87  --  90   RDW 15.8* 15.6*  --  16.0*        Chemistries:  Recent Labs   Lab 09/10/20  0250 09/10/20  1054 09/10/20  1441 09/10/20  1744 09/10/20  2311 09/11/20  0321   * 151* 150*  --   --   150*   K 2.4* 2.3* 3.8  --   --  2.9*    108 111*  --   --  110   CO2 27 29 29  --   --  26   BUN 66* 58* 62*  --   --  69*   CREATININE 2.2* 1.7* 1.7*  --   --  1.6*   CALCIUM 8.0* 8.0* 7.6*  --   --  7.8*   ALBUMIN 2.6* 2.5*  --  2.0* 2.1* 2.1*   PROT 5.3* 5.2*  --  4.5* 4.8* 5.0*   BILITOT 2.2* 2.3*  --  1.6* 2.1* 2.1*   ALKPHOS 264* 251*  --  196* 206* 208*   * 161*  --  119* 115* 112*   * 380*  --  218* 182* 155*   MG 1.8 2.4 2.7*  --   --  2.4   PHOS 2.6*  --  2.0*  --   --  2.5*       All pertinent labs within the past 24 hours have been reviewed.    Significant Imaging:  I have reviewed all pertinent imaging results/findings within the past 24 hours.    Assessment/Plan:     * Perforated abdominal viscus  Neuro:  -sedation: none  -analgesia: PRN oxy   -discontinue Seroquel        CV:   - heparin gtt discontinued 9/9.   - HDS off of pressors    Pulm:  - 5L NC     FEN/GI: Perforated stomach; s/p washout and patch (9/4); abdominal closure (9/6)  - s/p washout and closure on 9/6   - Tube feeds held  - Daily metabolic panel with PRN repletion of electrolytes  - pantoprazole for ulcer ppx     Renal: Pt with JACQUE upon arrival; BUN/Cr: 32/2.1 (9/4)  - JACQUE improving  - Continue to monitor with daily metabolic labs  - nephrology on board, appreciating recs     HEME/ID:   - 9/5: 2 U pRBC given with appropriate response  - Hb stable  - Daily CBC  - s/p Liver transplant in 2002; Daily tacrolimus level with AM labs. Per hepatology, restart tacrolimus 1mg BID  - Consult ID  - Lovenox for DVT ppx     Endo:  - Hx of hypothyroidism; not taking synthroid at home    Dispo:  - Continue SICU care      Critical care was time spent personally by me on the following activities: development of treatment plan with patient or surrogate and bedside caregivers, discussions with consultants, evaluation of patient's response to treatment, examination of patient, ordering and performing treatments and interventions,  ordering and review of laboratory studies, ordering and review of radiographic studies, pulse oximetry, re-evaluation of patient's condition.  This critical care time did not overlap with that of any other provider or involve time for any procedures.     Gurpreet Price MD  Critical Care - Surgery  [facility]

## 2020-09-11 NOTE — PLAN OF CARE
"      SICU PLAN OF CARE NOTE    Dx: Perforated abdominal viscus    Shift Events: VSS throughout shift. No acute events occurred.      Goals of Care: Will continue to monitor and maintain MAPs > 65.     Neuro: Arouses to Voice, Follows Commands, Moves All Extremities, and Confused    Vital Signs: BP (!) 117/53   Pulse (!) 117   Temp 98.5 °F (36.9 °C) (Oral)   Resp (!) 34   Ht 4' 11" (1.499 m)   Wt 73 kg (160 lb 15 oz)   SpO2 (!) 90%   BMI 32.51 kg/m²     Respiratory: Nasal Cannula 3L     Diet: NPO and Tube Feeds    Gtts: None    Urine Output: Urinary Catheter 345 cc/shift    Drains: None    Restraints Need for restraints frequently reassessed.  No injuries noted.  Will continue to monitor and remove when appropriate.     Labs/Accuchecks: Labs monitored and replaced as needed. Accuchecks Q4H.     Skin: See flowsheets.  Pt repositioned frequently.  Heel and sacral foams in place.  Specialty bed plugged in and working properly.  Tubing and devices kept free of skin.         "

## 2020-09-11 NOTE — PROGRESS NOTES
[facility]  Nephrology  Progress Note    Patient Name: Elda Hernandez  MRN: [id number]  Admission Date: 9/3/2020  Hospital Length of Stay: 8 days  Attending Provider: Clark Rodriguez MD   Primary Care Physician: Jordana Woods MD  Principal Problem:Perforated abdominal viscus    Subjective:     HPI: 50 y.o. female that has a past medical history of Angiolipoma of kidney (10/1/2018), Arnold-Chiari malformation, Depression, Esophageal stricture, Essential tremor, Hypertension, Liver fibrosis, Osteoporosis, Recurrent urinary tract infection, Seizures, SIADH (syndrome of inappropriate ADH production), Squamous cell carcinoma (10/2014), and Von Gierke disease s/p liver transplant (2002, on tacro + MMF), HTN, and depression that presented to OSH due to worsening diffuse abdominal for 3 weeks, associated with nausea, vomiting and decreased PO intake. Abdominal CT scan showed gastric outlet narrowing, obstructive-related changes at pyloric-duodenal junction, and thickening of GE junction. EGD on 9/3 found with grade IV esophagitis with stricture. Pt transferred for GI evaluation and found with hypotension requiring vasopressors and lactic Acid of 3.9. Repeat CT showed decompressed abdomen and evidence of free air. Exploratory laparotomy performed and found with stomach perforation s/p washout, gastric perforation repair, and gastrostomy tube placement.  Nephrology consulted for JACQUE    Interval History: Patient seen and examined at bedside, Urine output decreased , patient received 1.5 liters of Ringer's bolus. UOP at 1,085cc over the past 24 hours.     Review of patient's allergies indicates:   Allergen Reactions    Codeine Itching     Other reaction(s): Itching    Lipitor [atorvastatin] Other (See Comments)     Other reaction(s): Muscle pain  Muscle cranmps    Morphine Itching     Other reaction(s): nausea and vomiting     Zoloft [sertraline] Other (See Comments)     Tremors/muscle spasms      Current Facility-Administered Medications   Medication Frequency    calcium gluconate 1g in dextrose 5% 100mL (ready to mix system) PRN    calcium gluconate 2 g in dextrose 5 % 100 mL IVPB PRN    calcium gluconate 3 g in dextrose 5 % 100 mL IVPB PRN    dextrose 50% injection 12.5 g PRN    dextrose 50% injection 25 g PRN    glucagon (human recombinant) injection 1 mg PRN    glucose chewable tablet 16 g PRN    glucose chewable tablet 24 g PRN    heparin (porcine) injection 5,000 Units Q8H    hydrALAZINE injection 10 mg Q8H PRN    insulin aspart U-100 pen 1-10 Units Q4H PRN    iohexol (OMNIPAQUE) oral solution 15 mL PRN    labetalol 20 mg/4 mL (5 mg/mL) IV syring Q6H PRN    lactulose 20 gram/30 mL solution Soln 20 g TID    levothyroxine tablet 75 mcg Daily    magnesium sulfate 2g in water 50mL IVPB (premix) PRN    magnesium sulfate 2g in water 50mL IVPB (premix) PRN    melatonin tablet 6 mg Nightly PRN    nitroGLYCERIN 2% TD oint ointment 0.5 inch Q6H    ondansetron injection 4 mg Q8H PRN    oxyCODONE-acetaminophen 7.5-325 mg per tablet 1 tablet Q6H PRN    pantoprazole suspension 40 mg Daily    potassium chloride 40 mEq in 100 mL IVPB (FOR CENTRAL LINE ADMINISTRATION ONLY) PRN    And    potassium chloride 20 mEq in 100 mL IVPB (FOR CENTRAL LINE ADMINISTRATION ONLY) PRN    And    potassium chloride 40 mEq in 100 mL IVPB (FOR CENTRAL LINE ADMINISTRATION ONLY) PRN    promethazine tablet 25 mg Q6H PRN    sodium chloride 0.9% flush 10 mL PRN    sodium chloride 0.9% flush 10 mL PRN    sodium phosphate 15 mmol in dextrose 5 % 250 mL IVPB PRN    sodium phosphate 20.01 mmol in dextrose 5 % 250 mL IVPB PRN    sodium phosphate 30 mmol in dextrose 5 % 250 mL IVPB PRN    tacrolimus (PROGRAF) 1 mg/mL oral syringe BID       Objective:     Vital Signs (Most Recent):  Temp: 98.5 °F (36.9 °C) (09/11/20 0300)  Pulse: (!) 117 (09/11/20 0600)  Resp: (!) 34 (09/11/20 0600)  BP: (!) 117/53 (09/11/20  0545)  SpO2: (!) 90 % (09/11/20 0600)  O2 Device (Oxygen Therapy): nasal cannula (09/11/20 0600) Vital Signs (24h Range):  Temp:  [98.4 °F (36.9 °C)-98.8 °F (37.1 °C)] 98.5 °F (36.9 °C)  Pulse:  [] 117  Resp:  [24-43] 34  SpO2:  [84 %-97 %] 90 %  BP: ()/(44-80) 117/53     Weight: 73 kg (160 lb 15 oz) (09/11/20 0500)  Body mass index is 32.51 kg/m².  Body surface area is 1.74 meters squared.    I/O last 3 completed shifts:  In: 5205 [I.V.:1195; NG/GT:2510; IV Piggyback:1500]  Out: 5335 [Urine:5335]    Physical Exam  Constitutional:       General: She is not in acute distress.  Cardiovascular:      Rate and Rhythm: Normal rate.   Abdominal:      General: There is no distension.      Palpations: Abdomen is soft.      Tenderness: There is no abdominal tenderness.      Comments: G tube    Skin:     General: Skin is warm and dry.         Significant Labs:  CBC:   Recent Labs   Lab 09/11/20 0321   WBC 39.09*   RBC 3.47*   HGB 10.2*   HCT 31.1*   *   MCV 90   MCH 29.4   MCHC 32.8     CMP:   Recent Labs   Lab 09/11/20 0321   *   CALCIUM 7.8*   ALBUMIN 2.1*   PROT 5.0*   *   K 2.9*   CO2 26      BUN 69*   CREATININE 1.6*   ALKPHOS 208*   *   *   BILITOT 2.1*     Coagulation:   Recent Labs   Lab 09/11/20 0321   INR 1.6*   APTT 34.1*     All labs within the past 24 hours have been reviewed.     Significant Imaging:  Labs: Reviewed    Assessment/Plan:     JACQUE (acute kidney injury)  JACQUE most likely secondary to hypoperfusion, likely ATN.  Pt also with non anion gap metabolic acidosis with respiratory compensation  Given Lasix 150mg IV x1 to poor UOP on 09/07    - No emergent need for dialysis today  - UOP 1.1 L/24 hrs, markedly decreased from yesterday  - received 1.5 liters of LR yesterday  - please give 1 liter of half NS due to hypernatremia   - lasix discontinued, last dose at 2 pm on 09/09  - Cr downtrend: 3.0-->3.2-->2.2-->1.7-->1.6  - on FWB at 250cc q 6 hrs for free  water supplementation   - Strict I/O and chart   - Renally dose all medications   - Avoid nephrotoxic medications, NSAIDs, IV contrast, ACE/ARB.  - Maintain MAP > 65  - Hb > 7 gm/dL        Hypernatremia  On FWB at 250 cc q 6 hrs  Please give 1 liter of half NS    Hypokalemia  3.2 on latest BMP  Replenish aggresively  Repeat BMP to document resolution    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  Management as per Hepatology   On Prograf, monitor daily levels       Thank you for your consult. I will follow-up with patient. Please contact us if you have any additional questions.    Al Shea MD  Nephrology  [facility]

## 2020-09-11 NOTE — PLAN OF CARE
09/11/20 1046   Discharge Reassessment   Assessment Type Discharge Planning Reassessment   Discharge plan remains the same: Yes   Provided patient/caregiver education on the expected discharge date and the discharge plan Yes   Do you have any problems affording any of your prescribed medications? No   Discharge Plan A Other  (TBD:  patient's discharge disposition will depend on prognosis)   DME Needed Upon Discharge  other (see comments)  (TBD)       Susana Pringle MPH, RN, CM  Ext. [phone]

## 2020-09-11 NOTE — PHYSICIAN QUERY
PT Name: Elda Hernandez  MR #: [id number]     ACUITY OF CONDITION CLARIFICATION      CDS/: Justyna Hayes RN, CDS               Contact information: [email or web address]  This form is a permanent document in the medical record.     Query Date: September 11, 2020    By submitting this query, we are merely seeking further clarification of documentation to reflect the severity of illness of your patient. Please utilize your independent clinical judgment when addressing the question(s) below.    The Medical record reflects the following:     Indicators   Supporting Clinical Findings Location in Medical Record   x Documentation of condition sepsis from perforated gastric ulcer Vascular Surgery Consult 9/4     Lab Value(s)     x Radiology Findings CT that was performed prior to EGD on [facility] demonstrates marked gastric outlet obstruction  The post endoscopy CT shows resolution of GOO which would be most consistent with perforation   Surgery Consult 9/4    x Treatment/Medication Preoperative diagnosis - peritonitis with gastrointestinal perforation  Postoperative diagnosis - same with perforation localized the greater curvature the proximal stomach  Procedure - exploratory laparotomy vigorous irrigation of abdominal contamination repair of gastric perforation omental patch   Op Note 9/4    x Other presented to [facility] with diffuse abdominal pain that progressively worsened over the course of 3 weeks. Associated with nausea, vomiting, and decreased PO intake. Pain worsened with food, decreased in intensity with smaller meals. Initial labs notable for hypoK. UA showed 3+ protein. CT abdo/pelvis showed gastric distension, gastric outlet narrowing, obstructive-related changes at pyloric-duodenal junction, and thickening of GE junction. Patient was admitted to  for further evaluation. She underwent EGD on 9/3 which grade IV esophagitis with stricture. GI unable to pass adult scope through  stricture, no peds scope was available. Patient was [place] [facility] [place].     Paged by nursing around 6:10 AM that patient was bradycardic, hypotensive, and diaphoretic...Abdominal xray STAT ordered given concern for possible perforation given initial presentation [place] [facility] [place] obstruction and AES eval    H&P 9/4                           Significant Event 9/4       Provider, please specify the acuity/chronicity of _gastric ulcer_:    [  x ] Acute   [   ] Acute on chronic   [   ] Other, please specify _____________   [   ] Clinically Undetermined         Present on admission (POA) status:   [   ] Yes (Y)                           [   ] Clinically Undetermined (W)  [   ] No (N)                            [   ] Documentation insufficient to determine if condition is POA (U)       Please document in your progress notes daily for the duration of treatment until resolved, and include in your discharge summary.

## 2020-09-11 NOTE — ASSESSMENT & PLAN NOTE
JACQUE most likely secondary to hypoperfusion, likely ATN.  Pt also with non anion gap metabolic acidosis with respiratory compensation  Given Lasix 150mg IV x1 to poor UOP on 09/07    - No emergent need for dialysis today  -  cc/24 hrs, decreased from day before   - on Dextrose 5% 50cc/hr as tolerated   - lasix discontinued, last dose at 2 pm on 09/09  - Cr downtrend: 3.0-->3.2-->2.2-->1.7-->1.6-->1.5  - not on Free water boluses  - Strict I/O and chart   - Renally dose all medications   - Avoid nephrotoxic medications, NSAIDs, IV contrast, ACE/ARB.  - Maintain MAP > 65  - Hb > 7 gm/dL

## 2020-09-11 NOTE — TREATMENT PLAN
Hepatology Treatment Plan    Elda Hernandez is a 50 y.o. female admitted to hospital 9/3/2020 (Hospital Day: 9) due to Perforated abdominal viscus.     Interval History  Significant rise in WBC to 17 -> 39K  CT a/p without evidence of perforation, but possible partial SBO and b/l pleural effusions.  Liver enzymes improving.    Objective  Temp:  [98.4 °F (36.9 °C)-98.8 °F (37.1 °C)] 98.5 °F (36.9 °C) (09/11 1100)  Pulse:  [] 105 (09/11 1345)  BP: ()/(44-70) 106/53 (09/11 1345)  Resp:  [26-56] 50 (09/11 1345)  SpO2:  [89 %-99 %] 93 % (09/11 1345)      Laboratory    MELD-Na score: 19 at 9/11/2020 12:18 PM  MELD score: 19 at 9/11/2020 12:18 PM  Calculated from:  Serum Creatinine: 1.6 mg/dL at 9/11/2020  3:21 AM  Serum Sodium: 150 mmol/L (Rounded to 137 mmol/L) at 9/11/2020  3:21 AM  Total Bilirubin: 1.9 mg/dL at 9/11/2020 12:18 PM  INR(ratio): 1.6 at 9/11/2020  3:21 AM  Age: 50 years 3 months    Recent Labs   Lab 09/10/20  0009 09/10/20  0250 09/11/20  0321   HGB 10.2* 10.2* 10.2*       Lab Results   Component Value Date    WBC 39.09 (H) 09/11/2020    HGB 10.2 (L) 09/11/2020    HCT 31.1 (L) 09/11/2020    MCV 90 09/11/2020     (L) 09/11/2020       Lab Results   Component Value Date     (H) 09/11/2020    K 3.2 (L) 09/11/2020     09/11/2020    CO2 26 09/11/2020    BUN 69 (H) 09/11/2020    CREATININE 1.6 (H) 09/11/2020    CALCIUM 7.8 (L) 09/11/2020    ANIONGAP 14 09/11/2020    ESTGFRAFRICA 42.4 (A) 09/11/2020    EGFRNONAA 36.8 (A) 09/11/2020       Lab Results   Component Value Date    ALT 98 (H) 09/11/2020     (H) 09/11/2020     (H) 04/07/2018    ALKPHOS 196 (H) 09/11/2020    BILITOT 1.9 (H) 09/11/2020       Lab Results   Component Value Date    INR 1.6 (H) 09/11/2020    INR 1.3 (H) 09/10/2020    INR 1.1 09/09/2020       Plan  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  Ms. Hernandez is a 51yo F w/PMH of von Gierke disease s/p liver transplant (2002, on tacro + MMF,  follows Dr. Nielsen), hx chronic rejection (bx 2015 with acute and chronic rejection s/p steroids), biliary stricture and ductopenic rejection, recently with cirrhosis on fibroscan (10/2019), HTN, and depression who presents as a transfer for further evaluation of gastric outlet obstruction and esophageal stricturing.  EGD 9/3 at OSH with severe esophageal stricturing so transferred for GI evaluation.  Course complicated by gastric perforation s/p repair.  Hepatology following for immunosuppression management.     Since surgery, has been extubated but still encephalopathic.  Has had worsening liver enzymes with stable bili / AP. U/S liver transplant unremarkable. Restarted tacrolimus. Kidney function is improving.     Tacrolimus level is therapeutic  Home dose is tacrolimus 2mg AM / 1mg PM and cellcept 500mg bid.     - continue tacrolimus 1mg AM and 1mg PM  - continue to hold cellcept in current critical state  - Continue lactulose (PO or rectal) if possible for encephalopathy with goal 3-4 BMs daily  - If LFTs improve, may be able to hold off on liver biopsy  - Check daily tacrolimus trough, CBC, CMP and INR    Thank you for involving us in the care of Elda Hernandez. Please call with any additional questions, concerns or changes in the patient's clinical status.    Js Matias MD  Gastroenterology Fellow, PGY V

## 2020-09-11 NOTE — SUBJECTIVE & OBJECTIVE
Interval History/Significant Events: NAEO. Mental status improving; however patient with worsening abdominal distension and rising WBC count. Plan for CT abd today. Tube feeds held.    Follow-up For: Procedure(s) (LRB):  LAPAROTOMY, EXPLORATORY with abd closure (N/A)    Post-Operative Day: 5 Days Post-Op    Objective:     Vital Signs (Most Recent):  Temp: 98.5 °F (36.9 °C) (09/11/20 0700)  Pulse: (!) 114 (09/11/20 0900)  Resp: (!) 36 (09/11/20 0900)  BP: (!) 97/56 (09/11/20 0900)  SpO2: (!) 91 % (09/11/20 0900) Vital Signs (24h Range):  Temp:  [98.4 °F (36.9 °C)-98.8 °F (37.1 °C)] 98.5 °F (36.9 °C)  Pulse:  [] 114  Resp:  [24-43] 36  SpO2:  [84 %-98 %] 91 %  BP: ()/(44-74) 97/56     Weight: 73 kg (160 lb 15 oz)  Body mass index is 32.51 kg/m².      Intake/Output Summary (Last 24 hours) at 9/11/2020 0926  Last data filed at 9/11/2020 0900  Gross per 24 hour   Intake 4549 ml   Output 1030 ml   Net 3519 ml       Physical Exam  Vitals signs and nursing note reviewed.   Constitutional:       General: She is not in acute distress.  HENT:      Head: Normocephalic and atraumatic.      Nose: Nose normal.      Mouth/Throat:      Mouth: Mucous membranes are moist.   Eyes:      Extraocular Movements: Extraocular movements intact.   Neck:      Musculoskeletal: Normal range of motion.      Comments: RIJ central line  Cardiovascular:      Rate and Rhythm: Regular rhythm. Tachycardia present.      Heart sounds: Normal heart sounds.   Pulmonary:      Breath sounds: Normal breath sounds. No wheezing.   Abdominal:      Comments: Midline incision with dressing in place; dressing dry, intact with old blood stain; G tube in place   Musculoskeletal: Normal range of motion.   Skin:     General: Skin is warm.      Comments: L peringuinal hematoma.  LLE pulses 2+.   Neurological:      General: No focal deficit present.      Mental Status: She is alert.      Comments: AAOx2         Vents:      Lines/Drains/Airways     Central  Venous Catheter Line            Percutaneous Central Line Insertion/Assessment - Triple Lumen  09/04/20 0800 right internal jugular 7 days          Drain                 Gastrostomy/Enterostomy 09/04/20 LUQ decompression 7 days         Urethral Catheter 09/04/20 1300 Non-latex;Straight-tip 18 Fr. 6 days                Significant Labs:    CBC/Anemia Profile:  Recent Labs   Lab 09/10/20  0009 09/10/20  0250 09/10/20  1110 09/11/20  0321   WBC 17.94* 17.50*  --  39.09*   HGB 10.2* 10.2*  --  10.2*   HCT 30.6* 30.8* 29* 31.1*   * 109*  --  146*   MCV 88 87  --  90   RDW 15.8* 15.6*  --  16.0*        Chemistries:  Recent Labs   Lab 09/10/20  0250 09/10/20  1054 09/10/20  1441 09/10/20  1744 09/10/20  2311 09/11/20  0321   * 151* 150*  --   --  150*   K 2.4* 2.3* 3.8  --   --  2.9*    108 111*  --   --  110   CO2 27 29 29  --   --  26   BUN 66* 58* 62*  --   --  69*   CREATININE 2.2* 1.7* 1.7*  --   --  1.6*   CALCIUM 8.0* 8.0* 7.6*  --   --  7.8*   ALBUMIN 2.6* 2.5*  --  2.0* 2.1* 2.1*   PROT 5.3* 5.2*  --  4.5* 4.8* 5.0*   BILITOT 2.2* 2.3*  --  1.6* 2.1* 2.1*   ALKPHOS 264* 251*  --  196* 206* 208*   * 161*  --  119* 115* 112*   * 380*  --  218* 182* 155*   MG 1.8 2.4 2.7*  --   --  2.4   PHOS 2.6*  --  2.0*  --   --  2.5*       All pertinent labs within the past 24 hours have been reviewed.    Significant Imaging:  I have reviewed all pertinent imaging results/findings within the past 24 hours.

## 2020-09-11 NOTE — PHYSICIAN QUERY
PT Name: Elda Hernandez  MR #: [id number]     RESPIRATORY CONDITION CLARIFICATION     CDS/: Justyna Hayes RN, CDS               Contact information: [email or web address]    This form is a permanent document in the medical record.     Query Date: September 11, 2020    By submitting this query, we are merely seeking further clarification of documentation.  Please utilize your independent clinical judgment when addressing the question(s) below.  The Medical Record contains the following   Indicators   Supporting Clinical Findings Location in Medical Record   x SOB, BLAIR, Wheezing, Productive Cough, Use of Accessory Muscles, etc. Respiratory: Negative for cough, shortness of breath and wheezing.  Pulmonary effort is normal. No respiratory distress    H&P 9/4    x RR         ABGs         O2 sat Resp:  [16-20] 20  SpO2:  [90 %-99 %] 97 %    On day of admission a rapid response was called due to patient being hypotensive: Doppler BP 62/0; HR 120s, SpO2 99%. Lethargic, diaphoretic    Resp:  [0-34] 24  SpO2:  [91 %-100 %] 92 %    POC PH: 7.202 (LL)  POC PCO2: 34.4 (L)  POC PO2: 91  Sample: ARTERIAL  DelSys: Nasal Can    POC PH: 7.372  POC PCO2: 26.0 (LL)  POC PO2: 71 (L)  Sample: ARTERIAL  DelSys: Adult Vent    POC PH: 7.310 (L)  POC PCO2: 34.2 (L)  POC PO2: 128 (H)  Sample: ARTERIAL  DelSys: Adult Vent    POC PH: 7.339 (L)  POC PCO2: 30.3 (L)  POC PO2: 63 (L)  Sample: ARTERIAL  DelSys: Adult Vent     POC PH: 7.387  POC PCO2: 28.2 (LL)  POC PO2: 99  Sample: ARTERIAL  DelSys: Adult Vent    POC PH: 7.437  POC PCO2: 24.1 (LL)  POC PO2: 62 (L)  Sample: ARTERIAL  DelSys: Room Air     H&P 9/4       Critical Care H&P 9/4         Surgery PN 9/5       ABG 9/4             ABG 9/5            ABG 9/6             ABG 9/6             ABG 9/7             ABG 9/9     Hypoxia/Hypercapnia     x BiPAP/Intubation/Mechanical Ventilation Called into room for emergent intubation. Dr Alcala and Dr Banuelos at bedside. /76, ,  SP02 98%  11:19 10mg etomidate IVP followed by 60mg rocuronium  11:23 ETT in place with BBS, positive color change, hypotensive,   100mcg hayes IVP and 1L LR   11:25 BP 83/72, , SP02 94%    Patient decompensated requiring multiple pressors and intubation. CT scan showed free air. Prior to intubation patient reported severe abdominal pain.    plans to take patient emergently to the OR.  - Intubate for airway protection    patient failed SBT this am due to apnea  - plan for SBT trial this afternoon as sedation is weaned    extubated to 2L NC   RN Note 9/4                     Surgery Consult 9/4         Critical Care H&P 9/4       A&P Note 9/8         RN Note 9/8     Supplemental O2      Home O2, Oxygen Dependence     x Respiratory Distress or Failure Acute respiratory failure. Initially significantly acidotic. This has slowly been improving. With an elevated lactate which we are continuing to follow. Maintaining adequate oxygenation.    Acute respiratory failure/insufficiency. On current ventilator settings the patient is oxygenating adequately and ventilating well. However she does have a significant PaO2 to FiO2 ratio. Chest x-raysAre not available today but I would predict that there is a degree of fluid overload.     Critical Care H&P 9/4           Critical Care PN 9/6    x Radiology Findings Impression:  No focal consolidation or other acute abnormality.    Mild prominence of pulmonary vasculature with bilateral interstitial lung markings similar to prior exam.  Small left pleural effusion.   CXR 9/4       CXR 9/5    x Acute/Chronic Illness Patient decompensated and with free air on CT scan, likely gastric perforation.  To OR for class A exploratory laparotomy    septic shock.    Procedure - exploratory laparotomy washout and closure of abdomen Surgery Consult 9/4         Critical Care H&P 9/4     Op Note 9/6     Treatment      Other       Respiratory failure can be acute, chronic or both. It is generally  further specified as hypoxic, hypercapnic or both. Lastly, it is important to identify an etiology, if known or suspected.   References:: https://www.acphospitalist.org/archives/2013/10/coding.htm    http://[email or web address]/acute-respiratory-failure-know    The noted clinical guidelines are only system guidelines and do not replace the providers clinical judgment.    Provider, due to conflicting documentation, please clarify a diagnosis associated with the above clinical findings.    [ x   ] Acute Respiratory Failure with Hypoxia - ABG pO2 < 60 mmHg or O2 sat of <91% on room air and respiratory symptoms documented   [    ] Acute Respiratory Insufficiency - Generally describes less severe respiratory symptoms and measurements (pO2, SpO2, pH, and pCO2) not meeting criteria for respiratory failure   [    ] No Respiratory Failure, Maintained on Vent for Routine Care or Airway Protection -  purposely intubated for airway protection (e.g.: angioedema, stroke, trauma); without meeting the criteria for respiratory failure.   [    ] Other Respiratory Diagnosis (please specify): _________________   [   ] Clinically Undetermined     Present on admission (POA) status:   [   ] Yes (Y)                          [ ] Clinically Undetermined (W)  [   ] No (N)                            [   ] Documentation insufficient to determine if condition is POA (U)          Please document in your progress notes daily for the duration of treatment until resolved and include in your discharge summary.

## 2020-09-11 NOTE — PLAN OF CARE
"      SICU PLAN OF CARE NOTE    Dx: Perforated abdominal viscus    Goals of Care: MAP >60, SBP<180, Accucheck Q4    Vital Signs: BP (!) 94/52   Pulse 104   Temp 98.5 °F (36.9 °C) (Oral)   Resp (!) 59   Ht 4' 11" (1.499 m)   Wt 73 kg (160 lb 15 oz)   SpO2 (!) 94%   BMI 32.51 kg/m²     Exam: appears acutely ill, no distress, mildly obese    Cardiac: Sinus tachycardic    Resp: pt O2 requirement increased during shift from 3L NC with SpO2 <88% to 10L high flow NC with SpO2 maintained in the 90s.     Neuro: Follows Commands, Moves All Extremities, and Confused, pt is oriented to person and place but not time or situation, MD aware.     Gtts: D5 @50    Urine Output: Urinary Catheter ~400 cc/shift    Drains: TF held, 625 cc residual @0700, MD notified. G tube to gravity 550cc/shift green brown output.     Diet: NPO     Labs/Accuchecks: daily labs, K+ rechecked, hepatic panel Q6, Accuchecks Q4    Skin: all skin monitored for breakdown, mid abdomen incision GABY with staples intact, G tube dressing CDI, R wrist foam dressing in place, bruising noted to R wrist, heel and sacral foams in place, no breakdown noted underneath, all pressure points elevated and protected, immerse bed plugged into wall, no new injuries noted.    Shift Events: SpO2 began to decrease to <85% on 2L NC at beginning of shift, O2 requirements increased from 2L NC to 10 L high flow NC and tachypnea noted, MD notified, CT of abdomen with PO contrast, TF stopped and G tube put to gravity abdominal distention and residuals >600 noted, pt remained confused and able to answer most questions during shift but calm and cooperative,  to beside during afternoon, all questions answered and emotional support provided.        "

## 2020-09-11 NOTE — PHYSICIAN QUERY
PT Name: Elda Hernandez  MR #: [id number]     Documentation Clarification      CDS/: Justyna Hayes RN, CDS               Contact information: [email or web address]    This form is a permanent document in the medical record.     Query Date: September 11, 2020    By submitting this query, we are merely seeking further clarification of documentation. Please utilize your independent clinical judgment when addressing the question(s) below.    The Medical Record reflects the following:    Supporting Clinical Findings Location in Medical Record   Patient initially presented to [facility] with complaints of diffuse abdominal pain that progressively worsened over the course of 3 weeks. Associated with nausea, vomiting, and decreased PO intake. Pain worsened with food, decreased in intensity with smaller meals... CT abdo/pelvis showed gastric distension, gastric outlet narrowing, obstructive-related changes at pyloric-duodenal junction, and thickening of GE junction...She underwent EGD on 9/3 which grade IV esophagitis with stricture. GI unable to pass an adult scope through stricture, no peds scope was available. Patient [place] [facility] [place].     Patient decompensated requiring multiple pressors and intubation. CT scan showed free air. Prior to intubation patient reported severe abdominal pain    Preoperative diagnosis - peritonitis with gastrointestinal perforation  Postoperative diagnosis - same with perforation localized the greater curvature the proximal stomach  Procedure - exploratory laparotomy vigorous irrigation of abdominal contamination repair of gastric perforation omental patch    Upper midline incision was made from xiphoid to umbilicus peritoneal cavity was uneventfully entered a laura retractor was applied the abdomen was then vigorously irrigated was filled with about 2 L of succus eventually after 10 L of irrigation we were able to clear the effluent      Critical Care H&P 9/4                       Surgery Consult 9/4         Op Note 9/4             Op Note 9/4                                                                             Provider, please further specify the diagnosis of _peritonitis_    [ x ] Generalized (acute) peritonitis    [   ] Other (please specify): ____________   [  ] Clinically undetermined                                                                                                           Present on admission (POA) status:   [   ] Yes (Y)                          [  ] Clinically Undetermined (W)  [   ] No (N)                            [   ] Documentation insufficient to determine if condition is POA (U)

## 2020-09-12 LAB
ALBUMIN SERPL BCP-MCNC: 1.8 G/DL (ref 3.5–5.2)
ALP SERPL-CCNC: 262 U/L (ref 55–135)
ALT SERPL W/O P-5'-P-CCNC: 88 U/L (ref 10–44)
ANION GAP SERPL CALC-SCNC: 13 MMOL/L (ref 8–16)
ANION GAP SERPL CALC-SCNC: 14 MMOL/L (ref 8–16)
ANISOCYTOSIS BLD QL SMEAR: SLIGHT
APTT BLDCRRT: 39.9 SEC (ref 21–32)
AST SERPL-CCNC: 118 U/L (ref 10–40)
BASO STIPL BLD QL SMEAR: ABNORMAL
BASOPHILS # BLD AUTO: 0.16 K/UL (ref 0–0.2)
BASOPHILS NFR BLD: 0.4 % (ref 0–1.9)
BILIRUB SERPL-MCNC: 2.1 MG/DL (ref 0.1–1)
BUN SERPL-MCNC: 69 MG/DL (ref 6–20)
BUN SERPL-MCNC: 71 MG/DL (ref 6–20)
BURR CELLS BLD QL SMEAR: ABNORMAL
CALCIUM SERPL-MCNC: 7.7 MG/DL (ref 8.7–10.5)
CALCIUM SERPL-MCNC: 7.8 MG/DL (ref 8.7–10.5)
CHLORIDE SERPL-SCNC: 108 MMOL/L (ref 95–110)
CHLORIDE SERPL-SCNC: 108 MMOL/L (ref 95–110)
CO2 SERPL-SCNC: 25 MMOL/L (ref 23–29)
CO2 SERPL-SCNC: 27 MMOL/L (ref 23–29)
CREAT SERPL-MCNC: 1.4 MG/DL (ref 0.5–1.4)
CREAT SERPL-MCNC: 1.5 MG/DL (ref 0.5–1.4)
DIFFERENTIAL METHOD: ABNORMAL
EOSINOPHIL # BLD AUTO: 0.3 K/UL (ref 0–0.5)
EOSINOPHIL NFR BLD: 0.6 % (ref 0–8)
ERYTHROCYTE [DISTWIDTH] IN BLOOD BY AUTOMATED COUNT: 17.3 % (ref 11.5–14.5)
EST. GFR  (AFRICAN AMERICAN): 45.8 ML/MIN/1.73 M^2
EST. GFR  (AFRICAN AMERICAN): 49.8 ML/MIN/1.73 M^2
EST. GFR  (NON AFRICAN AMERICAN): 39.8 ML/MIN/1.73 M^2
EST. GFR  (NON AFRICAN AMERICAN): 43.2 ML/MIN/1.73 M^2
GLUCOSE SERPL-MCNC: 114 MG/DL (ref 70–110)
GLUCOSE SERPL-MCNC: 116 MG/DL (ref 70–110)
HCT VFR BLD AUTO: 28 % (ref 37–48.5)
HGB BLD-MCNC: 8.6 G/DL (ref 12–16)
HYPOCHROMIA BLD QL SMEAR: ABNORMAL
IMM GRANULOCYTES # BLD AUTO: 1.67 K/UL (ref 0–0.04)
IMM GRANULOCYTES NFR BLD AUTO: 3.7 % (ref 0–0.5)
INR PPP: 1.6 (ref 0.8–1.2)
LYMPHOCYTES # BLD AUTO: 1.6 K/UL (ref 1–4.8)
LYMPHOCYTES NFR BLD: 3.4 % (ref 18–48)
MAGNESIUM SERPL-MCNC: 2.2 MG/DL (ref 1.6–2.6)
MCH RBC QN AUTO: 28.9 PG (ref 27–31)
MCHC RBC AUTO-ENTMCNC: 30.7 G/DL (ref 32–36)
MCV RBC AUTO: 94 FL (ref 82–98)
MONOCYTES # BLD AUTO: 2.3 K/UL (ref 0.3–1)
MONOCYTES NFR BLD: 5.1 % (ref 4–15)
NEUTROPHILS # BLD AUTO: 39.5 K/UL (ref 1.8–7.7)
NEUTROPHILS NFR BLD: 86.8 % (ref 38–73)
NRBC BLD-RTO: 1 /100 WBC
PHOSPHATE SERPL-MCNC: 4 MG/DL (ref 2.7–4.5)
PLATELET # BLD AUTO: 192 K/UL (ref 150–350)
PLATELET BLD QL SMEAR: ABNORMAL
PMV BLD AUTO: 12.2 FL (ref 9.2–12.9)
POCT GLUCOSE: 109 MG/DL (ref 70–110)
POCT GLUCOSE: 110 MG/DL (ref 70–110)
POCT GLUCOSE: 113 MG/DL (ref 70–110)
POCT GLUCOSE: 141 MG/DL (ref 70–110)
POIKILOCYTOSIS BLD QL SMEAR: SLIGHT
POLYCHROMASIA BLD QL SMEAR: ABNORMAL
POTASSIUM SERPL-SCNC: 3.3 MMOL/L (ref 3.5–5.1)
POTASSIUM SERPL-SCNC: 4.4 MMOL/L (ref 3.5–5.1)
PROT SERPL-MCNC: 4.6 G/DL (ref 6–8.4)
PROTHROMBIN TIME: 17.7 SEC (ref 9–12.5)
RBC # BLD AUTO: 2.98 M/UL (ref 4–5.4)
SODIUM SERPL-SCNC: 146 MMOL/L (ref 136–145)
SODIUM SERPL-SCNC: 149 MMOL/L (ref 136–145)
TACROLIMUS BLD-MCNC: 8.8 NG/ML (ref 5–15)
TOXIC GRANULES BLD QL SMEAR: PRESENT
WBC # BLD AUTO: 45.52 K/UL (ref 3.9–12.7)

## 2020-09-12 PROCEDURE — 85025 COMPLETE CBC W/AUTO DIFF WBC: CPT

## 2020-09-12 PROCEDURE — 63600175 PHARM REV CODE 636 W HCPCS: Performed by: STUDENT IN AN ORGANIZED HEALTH CARE EDUCATION/TRAINING PROGRAM

## 2020-09-12 PROCEDURE — 80053 COMPREHEN METABOLIC PANEL: CPT

## 2020-09-12 PROCEDURE — 63600175 PHARM REV CODE 636 W HCPCS: Performed by: SURGERY

## 2020-09-12 PROCEDURE — C9113 INJ PANTOPRAZOLE SODIUM, VIA: HCPCS | Performed by: STUDENT IN AN ORGANIZED HEALTH CARE EDUCATION/TRAINING PROGRAM

## 2020-09-12 PROCEDURE — 85610 PROTHROMBIN TIME: CPT

## 2020-09-12 PROCEDURE — 25000003 PHARM REV CODE 250: Performed by: SURGERY

## 2020-09-12 PROCEDURE — 25000003 PHARM REV CODE 250: Performed by: STUDENT IN AN ORGANIZED HEALTH CARE EDUCATION/TRAINING PROGRAM

## 2020-09-12 PROCEDURE — 80048 BASIC METABOLIC PNL TOTAL CA: CPT

## 2020-09-12 PROCEDURE — 94640 AIRWAY INHALATION TREATMENT: CPT

## 2020-09-12 PROCEDURE — 99291 PR CRITICAL CARE, E/M 30-74 MINUTES: ICD-10-PCS | Mod: 24,,, | Performed by: SURGERY

## 2020-09-12 PROCEDURE — 83735 ASSAY OF MAGNESIUM: CPT

## 2020-09-12 PROCEDURE — 20000000 HC ICU ROOM

## 2020-09-12 PROCEDURE — 99291 CRITICAL CARE FIRST HOUR: CPT | Mod: 24,,, | Performed by: SURGERY

## 2020-09-12 PROCEDURE — 80197 ASSAY OF TACROLIMUS: CPT

## 2020-09-12 PROCEDURE — 84100 ASSAY OF PHOSPHORUS: CPT

## 2020-09-12 PROCEDURE — 27000221 HC OXYGEN, UP TO 24 HOURS

## 2020-09-12 PROCEDURE — 25000242 PHARM REV CODE 250 ALT 637 W/ HCPCS: Performed by: SURGERY

## 2020-09-12 PROCEDURE — 94761 N-INVAS EAR/PLS OXIMETRY MLT: CPT

## 2020-09-12 PROCEDURE — 85730 THROMBOPLASTIN TIME PARTIAL: CPT

## 2020-09-12 PROCEDURE — 99900035 HC TECH TIME PER 15 MIN (STAT)

## 2020-09-12 RX ORDER — FLUCONAZOLE 2 MG/ML
400 INJECTION, SOLUTION INTRAVENOUS DAILY
Status: DISCONTINUED | OUTPATIENT
Start: 2020-09-12 | End: 2020-09-14

## 2020-09-12 RX ORDER — DEXTROSE MONOHYDRATE 50 MG/ML
INJECTION, SOLUTION INTRAVENOUS CONTINUOUS
Status: DISCONTINUED | OUTPATIENT
Start: 2020-09-12 | End: 2020-09-14

## 2020-09-12 RX ORDER — LEVOTHYROXINE SODIUM ANHYDROUS 100 UG/5ML
40 INJECTION, POWDER, LYOPHILIZED, FOR SOLUTION INTRAVENOUS DAILY
Status: DISCONTINUED | OUTPATIENT
Start: 2020-09-12 | End: 2020-09-16

## 2020-09-12 RX ADMIN — HEPARIN SODIUM 5000 UNITS: 5000 INJECTION INTRAVENOUS; SUBCUTANEOUS at 06:09

## 2020-09-12 RX ADMIN — IPRATROPIUM BROMIDE AND ALBUTEROL SULFATE 3 ML: .5; 2.5 SOLUTION RESPIRATORY (INHALATION) at 12:09

## 2020-09-12 RX ADMIN — HEPARIN SODIUM 5000 UNITS: 5000 INJECTION INTRAVENOUS; SUBCUTANEOUS at 03:09

## 2020-09-12 RX ADMIN — FLUCONAZOLE 400 MG: 2 INJECTION, SOLUTION INTRAVENOUS at 11:09

## 2020-09-12 RX ADMIN — POTASSIUM CHLORIDE 60 MEQ: 14.9 INJECTION, SOLUTION INTRAVENOUS at 07:09

## 2020-09-12 RX ADMIN — LACTULOSE 200 G: 10 SOLUTION ORAL at 04:09

## 2020-09-12 RX ADMIN — TACROLIMUS 0.5 MG: 0.5 CAPSULE ORAL at 08:09

## 2020-09-12 RX ADMIN — LEVOTHYROXINE SODIUM ANHYDROUS 40 MCG: 100 INJECTION, POWDER, LYOPHILIZED, FOR SOLUTION INTRAVENOUS at 09:09

## 2020-09-12 RX ADMIN — NITROGLYCERIN 0.5 INCH: 20 OINTMENT TOPICAL at 11:09

## 2020-09-12 RX ADMIN — TACROLIMUS 0.5 MG: 0.5 CAPSULE ORAL at 05:09

## 2020-09-12 RX ADMIN — PIPERACILLIN SODIUM AND TAZOBACTAM SODIUM 4.5 G: 4; .5 INJECTION, POWDER, LYOPHILIZED, FOR SOLUTION INTRAVENOUS at 08:09

## 2020-09-12 RX ADMIN — IPRATROPIUM BROMIDE AND ALBUTEROL SULFATE 3 ML: .5; 2.5 SOLUTION RESPIRATORY (INHALATION) at 07:09

## 2020-09-12 RX ADMIN — LACTULOSE 200 G: 10 SOLUTION ORAL at 09:09

## 2020-09-12 RX ADMIN — HEPARIN SODIUM 5000 UNITS: 5000 INJECTION INTRAVENOUS; SUBCUTANEOUS at 09:09

## 2020-09-12 RX ADMIN — ONDANSETRON 4 MG: 2 INJECTION INTRAMUSCULAR; INTRAVENOUS at 12:09

## 2020-09-12 RX ADMIN — IPRATROPIUM BROMIDE AND ALBUTEROL SULFATE 3 ML: .5; 2.5 SOLUTION RESPIRATORY (INHALATION) at 05:09

## 2020-09-12 RX ADMIN — IPRATROPIUM BROMIDE AND ALBUTEROL SULFATE 3 ML: .5; 2.5 SOLUTION RESPIRATORY (INHALATION) at 03:09

## 2020-09-12 RX ADMIN — PIPERACILLIN SODIUM AND TAZOBACTAM SODIUM 4.5 G: 4; .5 INJECTION, POWDER, LYOPHILIZED, FOR SOLUTION INTRAVENOUS at 09:09

## 2020-09-12 RX ADMIN — IPRATROPIUM BROMIDE AND ALBUTEROL SULFATE 3 ML: .5; 2.5 SOLUTION RESPIRATORY (INHALATION) at 08:09

## 2020-09-12 RX ADMIN — NITROGLYCERIN 0.5 INCH: 20 OINTMENT TOPICAL at 05:09

## 2020-09-12 RX ADMIN — DEXTROSE: 5 SOLUTION INTRAVENOUS at 08:09

## 2020-09-12 RX ADMIN — PANTOPRAZOLE SODIUM 40 MG: 40 INJECTION, POWDER, LYOPHILIZED, FOR SOLUTION INTRAVENOUS at 08:09

## 2020-09-12 NOTE — SUBJECTIVE & OBJECTIVE
Interval History/Significant Events:   - No acute events overnight  - AFVSS  - Mental status improving  - WBC to 45. Diflucan and Zosyn started  - Lactulose enemas w/ improving mental status    Follow-up For: Procedure(s) (LRB):  LAPAROTOMY, EXPLORATORY with abd closure (N/A)    Post-Operative Day: 6 Days Post-Op    Objective:     Vital Signs (Most Recent):  Temp: 97.4 °F (36.3 °C) (09/12/20 0300)  Pulse: 99 (09/12/20 0600)  Resp: 20 (09/12/20 0600)  BP: (!) 100/55 (09/12/20 0600)  SpO2: 99 % (09/12/20 0600) Vital Signs (24h Range):  Temp:  [97.4 °F (36.3 °C)-98.9 °F (37.2 °C)] 97.4 °F (36.3 °C)  Pulse:  [] 99  Resp:  [16-63] 20  SpO2:  [89 %-100 %] 99 %  BP: ()/(50-78) 100/55     Weight: 73 kg (160 lb 15 oz)  Body mass index is 32.51 kg/m².      Intake/Output Summary (Last 24 hours) at 9/12/2020 0808  Last data filed at 9/12/2020 0600  Gross per 24 hour   Intake 750 ml   Output 1697 ml   Net -947 ml       Physical Exam  Vitals signs and nursing note reviewed.   Constitutional:       General: She is not in acute distress.  HENT:      Head: Normocephalic and atraumatic.      Nose: Nose normal.      Mouth/Throat:      Mouth: Mucous membranes are moist.   Eyes:      Extraocular Movements: Extraocular movements intact.   Neck:      Musculoskeletal: Normal range of motion.      Comments: RIJ central line  Cardiovascular:      Rate and Rhythm: Normal rate and regular rhythm.      Heart sounds: Normal heart sounds.   Pulmonary:      Breath sounds: Normal breath sounds. No wheezing.   Abdominal:      Comments: Midline incision with dressing in place; dressing dry, intact with old blood stain; G tube to gravity with moderate output   Musculoskeletal: Normal range of motion.   Skin:     General: Skin is warm and dry.      Coloration: Skin is not jaundiced or pale.      Comments: L peringuinal hematoma.  LLE pulses 2+.   Neurological:      General: No focal deficit present.      Mental Status: She is alert.       Comments: AAOx2           Lines/Drains/Airways     Central Venous Catheter Line            Percutaneous Central Line Insertion/Assessment - Triple Lumen  09/04/20 0800 right internal jugular 8 days          Drain                 Gastrostomy/Enterostomy 09/04/20 LUQ decompression 8 days         Urethral Catheter 09/04/20 1300 Non-latex;Straight-tip 18 Fr. 7 days         Fecal Incontinence  09/12/20 0015 less than 1 day                Significant Labs:    CBC/Anemia Profile:  Recent Labs   Lab 09/10/20  1110 09/11/20  0321 09/12/20  0400   WBC  --  39.09* 45.52*   HGB  --  10.2* 8.6*   HCT 29* 31.1* 28.0*   PLT  --  146* 192   MCV  --  90 94   RDW  --  16.0* 17.3*        Chemistries:  Recent Labs   Lab 09/10/20  1441  09/11/20  0321 09/11/20  1218 09/11/20  1749 09/12/20  0400   *  --  150*  --   --  149*   K 3.8  --  2.9* 3.2*  --  3.3*   *  --  110  --   --  108   CO2 29  --  26  --   --  27   BUN 62*  --  69*  --   --  71*   CREATININE 1.7*  --  1.6*  --   --  1.4   CALCIUM 7.6*  --  7.8*  --   --  7.8*   ALBUMIN  --    < > 2.1* 2.0* 1.9* 1.8*   PROT  --    < > 5.0* 5.0* 4.9* 4.6*   BILITOT  --    < > 2.1* 1.9* 2.1* 2.1*   ALKPHOS  --    < > 208* 196* 196* 262*   ALT  --    < > 112* 98* 93* 88*   AST  --    < > 155* 136* 125* 118*   MG 2.7*  --  2.4  --   --  2.2   PHOS 2.0*  --  2.5*  --   --  4.0    < > = values in this interval not displayed.       All pertinent labs within the past 24 hours have been reviewed.    Significant Imaging:  I have reviewed all pertinent imaging results/findings within the past 24 hours.

## 2020-09-12 NOTE — ASSESSMENT & PLAN NOTE
50 y.o. female w/ free air on CT scan. S/p emergent ex lap on 9/4 w/ findings of gastric perforation, primary repair. Now 6 Days Post-Op from abdominal closure on 9/6, skin stapled closed, KERRY drains removed.     Continue SICU care  Leave G tube to gravity tonight. Will follow up WBC and output in AM. If improved, may consider restarting TF.  Zosyn and diflucan for suspected intraabodminal source.  May need IR drain into peritoneal fluid collection  Continue delirium precautions, leigh seroquel  Follow up blood culture taken today  Not on broad spectrum abx as clinically stable and not febrile - will continue to monitor

## 2020-09-12 NOTE — PLAN OF CARE
"  SICU PLAN OF CARE NOTE    Dx: Perforated abdominal viscus    Goals of Care: MAP >60, SBP <180, Accucheck Q4    Vital Signs: BP (!) 105/56   Pulse 107   Temp 98.6 °F (37 °C) (Oral)   Resp (!) 28   Ht 4' 11" (1.499 m)   Wt 73 kg (160 lb 15 oz)   SpO2 96%   BMI 32.51 kg/m²     Exam: appears acutely ill, flushed, no distress, mildly obese    Cardiac: NSR    Resp: Pt O2 weaned to maintain appropriate SpO2    Neuro: Arouses to Voice, Follows Commands, Moves All Extremities and Confused, pt remains oriented to person and place but not time or situation, MD aware.    Gtts: D5 @50    Urine Output:  Bhardwaj catheter removed at 1100. 190 cc/before removal. 229 cc on bladder scan, pt reports feeling the urge to urinate but she says that she is unable to at this time. External female catheter offered, explained and reinforced. Pt verbalizes understanding.     Drains:  G tube to gravity: 120 cc/shift  BMS output: 400 cc/shift    Diet: NPO     Labs/Accuchecks: Accuchecks Q4, daily labs, BMP, all labs trended and reviewed    Skin: all skin monitored for breakdown, mid abdomen incision GABY with staples intact, G tube dressing CDI, R wrist foam dressing in place, bruising noted to R wrist, heel and sacral foams in place, no breakdown noted underneath, BMS tolerated well by patient, no signs of discomfort or breakdown, all pressure points elevated and protected, immerse bed plugged into wall, no new injuries noted.    Shift Events: all VSS at this time, pt remains NPO with G tube to gravity, BMS in place with scheduled lactulose enemas, pt remains confused throughout shift and only oriented to person and place, MD notified at called to bedside at 1600 for unequal pupils noted during assessment, pupil sizes verified with pupilometer, both pupils remain brisk and reactive to light, mental status assessed with MD at bedside, will continue to monitor patient at this time,  at bedside for majority of the shift, updated on pt " current plan, all questions answered and emotional support provided.

## 2020-09-12 NOTE — PROGRESS NOTES
[facility]  Critical Care - Surgery  Progress Note    Patient Name: Elda Hernandez  MRN: [id number]  Admission Date: 9/3/2020  Hospital Length of Stay: 9 days  Code Status: Full Code  Attending Provider: Clark Rodriguez MD  Primary Care Provider: Jordana Woods MD   Principal Problem: Perforated abdominal viscus    Subjective:     Hospital/ICU Course:  9/4: Pt admitted to SICU following ex lap for GI perf. Intubated, sedated. Wound vac in place.  Soon after arrival to unit a mottled appearance to RUE was noted.  Vascular consulted and US revealed R radial artery thrombosis.    9/5: Tachycardic..  R arm appearance greatly improved overnight.    9/6: Patient returned to OR for abdominal washout, closure. R arm with dopplerable signals to ulnar artery and palmar arch.   9/7: Attempted to wean patient off of sedation in an effort to move towards extubation. Patient weaned off of propofol. Precedex started. Patient's heart rate very labile and sensitize to sedation.   9/8: patient extubated  9/9: SHANICE. Labetalol PRN for HTN   9/10:  Diuresis increased.  Cr downtrending  9/11: Increased abdominal distension and elevated WBC count. CT abd  9/12: WBC to 45 today. Diflucan and zosyn initiated. Lactulose enema w/ improving mental status    Interval History/Significant Events:   - No acute events overnight  - AFVSS  - Mental status improving  - WBC to 45. Diflucan and Zosyn started  - Lactulose enemas w/ improving mental status    Follow-up For: Procedure(s) (LRB):  LAPAROTOMY, EXPLORATORY with abd closure (N/A)    Post-Operative Day: 6 Days Post-Op    Objective:     Vital Signs (Most Recent):  Temp: 97.4 °F (36.3 °C) (09/12/20 0300)  Pulse: 99 (09/12/20 0600)  Resp: 20 (09/12/20 0600)  BP: (!) 100/55 (09/12/20 0600)  SpO2: 99 % (09/12/20 0600) Vital Signs (24h Range):  Temp:  [97.4 °F (36.3 °C)-98.9 °F (37.2 °C)] 97.4 °F (36.3 °C)  Pulse:  [] 99  Resp:  [16-63] 20  SpO2:  [89 %-100 %] 99 %  BP:  ()/(50-78) 100/55     Weight: 73 kg (160 lb 15 oz)  Body mass index is 32.51 kg/m².      Intake/Output Summary (Last 24 hours) at 9/12/2020 0808  Last data filed at 9/12/2020 0600  Gross per 24 hour   Intake 750 ml   Output 1697 ml   Net -947 ml       Physical Exam  Vitals signs and nursing note reviewed.   Constitutional:       General: She is not in acute distress.  HENT:      Head: Normocephalic and atraumatic.      Nose: Nose normal.      Mouth/Throat:      Mouth: Mucous membranes are moist.   Eyes:      Extraocular Movements: Extraocular movements intact.   Neck:      Musculoskeletal: Normal range of motion.      Comments: RIJ central line  Cardiovascular:      Rate and Rhythm: Normal rate and regular rhythm.      Heart sounds: Normal heart sounds.   Pulmonary:      Breath sounds: Normal breath sounds. No wheezing.   Abdominal:      Comments: Midline incision with dressing in place; dressing dry, intact with old blood stain; G tube to gravity with moderate output   Musculoskeletal: Normal range of motion.   Skin:     General: Skin is warm and dry.      Coloration: Skin is not jaundiced or pale.      Comments: L peringuinal hematoma.  LLE pulses 2+.   Neurological:      General: No focal deficit present.      Mental Status: She is alert.      Comments: AAOx2           Lines/Drains/Airways     Central Venous Catheter Line            Percutaneous Central Line Insertion/Assessment - Triple Lumen  09/04/20 0800 right internal jugular 8 days          Drain                 Gastrostomy/Enterostomy 09/04/20 LUQ decompression 8 days         Urethral Catheter 09/04/20 1300 Non-latex;Straight-tip 18 Fr. 7 days         Fecal Incontinence  09/12/20 0015 less than 1 day                Significant Labs:    CBC/Anemia Profile:  Recent Labs   Lab 09/10/20  1110 09/11/20  0321 09/12/20  0400   WBC  --  39.09* 45.52*   HGB  --  10.2* 8.6*   HCT 29* 31.1* 28.0*   PLT  --  146* 192   MCV  --  90 94   RDW  --  16.0*  17.3*        Chemistries:  Recent Labs   Lab 09/10/20  1441  09/11/20  0321 09/11/20  1218 09/11/20  1749 09/12/20  0400   *  --  150*  --   --  149*   K 3.8  --  2.9* 3.2*  --  3.3*   *  --  110  --   --  108   CO2 29  --  26  --   --  27   BUN 62*  --  69*  --   --  71*   CREATININE 1.7*  --  1.6*  --   --  1.4   CALCIUM 7.6*  --  7.8*  --   --  7.8*   ALBUMIN  --    < > 2.1* 2.0* 1.9* 1.8*   PROT  --    < > 5.0* 5.0* 4.9* 4.6*   BILITOT  --    < > 2.1* 1.9* 2.1* 2.1*   ALKPHOS  --    < > 208* 196* 196* 262*   ALT  --    < > 112* 98* 93* 88*   AST  --    < > 155* 136* 125* 118*   MG 2.7*  --  2.4  --   --  2.2   PHOS 2.0*  --  2.5*  --   --  4.0    < > = values in this interval not displayed.       All pertinent labs within the past 24 hours have been reviewed.    Significant Imaging:  I have reviewed all pertinent imaging results/findings within the past 24 hours.    Assessment/Plan:     * Perforated abdominal viscus  Neuro:  -sedation: none  -analgesia: PRN oxy. Dilauded  -discontinue Seroquel        CV:   - heparin gtt discontinued 9/9.   - HDS off of pressors    Pulm:  - HFNC @ 9L due to respiratory decline yesterday.  - Continue to wean as tolerated  - Duonebs Q6     FEN/GI: Perforated stomach; s/p washout and patch (9/4); abdominal closure (9/6)  - s/p washout and closure on 9/6   - Tube feeds held  - G tube open to gravity. Appx 550 output yesterday  - Lactulose enema Q8  - Daily metabolic panel with PRN repletion of electrolytes  - pantoprazole for ulcer ppx  - D5W @ 50/hr     Renal: Pt with JACQUE upon arrival; BUN/Cr: 32/2.1 (9/4)  - JACQUE improving  - Continue to monitor with daily metabolic labs  - nephrology on board, appreciating recs  - AMANDA Bhardwaj  - GODFREY this afternoon     HEME/ID:   - 9/5: 2 U pRBC given with appropriate response  - Hb stable  - Daily CBC  - s/p Liver transplant in 2002; Daily tacrolimus level with AM labs. Per hepatology, restart tacrolimus 1mg BID  - Consult ID  - Diflucan  and Zosyn initiated  - Lovenox for DVT ppx     Endo:  - Hx of hypothyroidism; not taking synthroid at home    Dispo:  - Continue SICU care     Critical care was time spent personally by me on the following activities: development of treatment plan with patient or surrogate and bedside caregivers, discussions with consultants, evaluation of patient's response to treatment, examination of patient, ordering and performing treatments and interventions, ordering and review of laboratory studies, ordering and review of radiographic studies, pulse oximetry, re-evaluation of patient's condition.  This critical care time did not overlap with that of any other provider or involve time for any procedures.     Khoa Damon MD  Critical Care - Surgery  [facility]

## 2020-09-12 NOTE — SUBJECTIVE & OBJECTIVE
Interval History: No acute events overnight.  Very high luekocytosis. No fever. Restarted zosyn and diflucan. G tube to gravity.     Medications:  Continuous Infusions:   dextrose 5 % 50 mL/hr at 09/12/20 1200     Scheduled Meds:   albuterol-ipratropium  3 mL Nebulization Q4H    fluconazole (DIFLUCAN) IVPB  400 mg Intravenous Daily    heparin (porcine)  5,000 Units Subcutaneous Q8H    lactulose  200 g Rectal TID    levothyroxine  40 mcg Intravenous Daily    nitroGLYCERIN 2% TD oint  0.5 inch Topical (Top) Q6H    pantoprazole  40 mg Intravenous Daily    piperacillin-tazobactam (ZOSYN) IVPB  4.5 g Intravenous Q12H    tacrolimus  0.5 mg Sublingual BID     PRN Meds:calcium gluconate IVPB, calcium gluconate IVPB, calcium gluconate IVPB, dextrose 50%, dextrose 50%, glucagon (human recombinant), glucose, glucose, hydrALAZINE, HYDROmorphone, insulin aspart U-100, labetaloL, magnesium sulfate IVPB, magnesium sulfate IVPB, melatonin, ondansetron, oxyCODONE-acetaminophen, potassium chloride in water **AND** potassium chloride in water **AND** potassium chloride in water, promethazine, sodium chloride 0.9%, sodium chloride 0.9%, sodium phosphate IVPB, sodium phosphate IVPB, sodium phosphate IVPB     Review of patient's allergies indicates:   Allergen Reactions    Codeine Itching     Other reaction(s): Itching    Lipitor [atorvastatin] Other (See Comments)     Other reaction(s): Muscle pain  Muscle cranmps    Morphine Itching     Other reaction(s): nausea and vomiting     Zoloft [sertraline] Other (See Comments)     Tremors/muscle spasms     Objective:     Vital Signs (Most Recent):  Temp: 98 °F (36.7 °C) (09/12/20 1100)  Pulse: 105 (09/12/20 1219)  Resp: (!) 22 (09/12/20 1219)  BP: (!) 101/55 (09/12/20 1100)  SpO2: (!) 94 % (09/12/20 1219) Vital Signs (24h Range):  Temp:  [97.4 °F (36.3 °C)-98.9 °F (37.2 °C)] 98 °F (36.7 °C)  Pulse:  [] 105  Resp:  [16-63] 22  SpO2:  [90 %-100 %] 94 %  BP: ()/(50-78)  101/55     Weight: 73 kg (160 lb 15 oz)  Body mass index is 32.51 kg/m².    Intake/Output - Last 3 Shifts       09/10 0700 - 09/11 0659 09/11 0700 - 09/12 0659 09/12 0700 - 09/13 0659    P.O.  0     I.V. (mL/kg) 599 (8.2) 750 (10.3)     NG/GT 2080 490     IV Piggyback 1500      Total Intake(mL/kg) 4179 (57.2) 1240 (17)     Urine (mL/kg/hr) 1485 (0.8) 837 (0.5) 290 (0.6)    Drains  640 115    Stool 0 300 100    Total Output 1485 1777 505    Net +2694 -537 -505           Stool Occurrence 1 x 1 x 1 x          Physical Exam  Constitutional:       General: She is not in acute distress.  Cardiovascular:      Rate and Rhythm: Normal rate.   Pulmonary:      Comments: 8L NC  Abdominal:      General: There is no distension.      Palpations: Abdomen is soft.      Tenderness: There is no abdominal tenderness.      Comments: G tube to gravity, bilious output  Exlap incision with stable closure c/d/i   Skin:     General: Skin is warm and dry.         Significant Labs:  CBC:   Recent Labs   Lab 09/12/20  0400   WBC 45.52*   RBC 2.98*   HGB 8.6*   HCT 28.0*      MCV 94   MCH 28.9   MCHC 30.7*     CMP:   Recent Labs   Lab 09/12/20  0400   *   CALCIUM 7.8*   ALBUMIN 1.8*   PROT 4.6*   *   K 3.3*   CO2 27      BUN 71*   CREATININE 1.4   ALKPHOS 262*   ALT 88*   *   BILITOT 2.1*       Significant Diagnostics:  I have reviewed all pertinent imaging results/findings within the past 24 hours.

## 2020-09-12 NOTE — CONSULTS
Informed Charge RN that [facility] is unable to see patient for IV access today due to high consult volume.

## 2020-09-12 NOTE — SUBJECTIVE & OBJECTIVE
Interval History: Double in WBC today, almost 40. Sent for CT abdomen pelvis that showed small bowel distension up to 4cm, but no abscess or intraabdominal fluid collection. Desaturation to 80s earlier, went from 2L NC, up to 10L high flow. Now down to 8L humidified. G tube to gravity with about 200cc of bilious output.  Mental status much improved today - answering questions appropriately.     Medications:  Continuous Infusions:   dextrose 5 % 50 mL/hr at 09/11/20 1800     Scheduled Meds:   albuterol-ipratropium  3 mL Nebulization Q4H    heparin (porcine)  5,000 Units Subcutaneous Q8H    lactulose  200 g Rectal TID    levothyroxine  75 mcg Per G Tube Daily    nitroGLYCERIN 2% TD oint  0.5 inch Topical (Top) Q6H    pantoprazole  40 mg Intravenous Daily    tacrolimus  0.5 mg Sublingual BID     PRN Meds:calcium gluconate IVPB, calcium gluconate IVPB, calcium gluconate IVPB, dextrose 50%, dextrose 50%, glucagon (human recombinant), glucose, glucose, hydrALAZINE, insulin aspart U-100, labetaloL, magnesium sulfate IVPB, magnesium sulfate IVPB, melatonin, ondansetron, oxyCODONE-acetaminophen, potassium chloride in water **AND** potassium chloride in water **AND** potassium chloride in water, promethazine, sodium chloride 0.9%, sodium chloride 0.9%, sodium phosphate IVPB, sodium phosphate IVPB, sodium phosphate IVPB     Review of patient's allergies indicates:   Allergen Reactions    Codeine Itching     Other reaction(s): Itching    Lipitor [atorvastatin] Other (See Comments)     Other reaction(s): Muscle pain  Muscle cranmps    Morphine Itching     Other reaction(s): nausea and vomiting     Zoloft [sertraline] Other (See Comments)     Tremors/muscle spasms     Objective:     Vital Signs (Most Recent):  Temp: 98.1 °F (36.7 °C) (09/11/20 1500)  Pulse: 109 (09/11/20 1907)  Resp: (!) 24 (09/11/20 1907)  BP: (!) 109/55 (09/11/20 1907)  SpO2: (!) 93 % (09/11/20 1907) Vital Signs (24h Range):  Temp:  [98.1 °F (36.7  °C)-98.5 °F (36.9 °C)] 98.1 °F (36.7 °C)  Pulse:  [101-123] 109  Resp:  [24-63] 24  SpO2:  [89 %-99 %] 93 %  BP: ()/(50-70) 109/55     Weight: 73 kg (160 lb 15 oz)  Body mass index is 32.51 kg/m².    Intake/Output - Last 3 Shifts       09/09 0700 - 09/10 0659 09/10 0700 - 09/11 0659 09/11 0700 - 09/12 0659    I.V. (mL/kg) 631 (9.4) 599 (8.2) 450 (6.2)    NG/GT 1040 2080 490    IV Piggyback  1500     Total Intake(mL/kg) 1671 (24.9) 4179 (57.2) 940 (12.9)    Urine (mL/kg/hr) 8655 (5.4) 1485 (0.8) 395 (0.4)    Drains   540    Stool  0     Total Output 8655 1485 935    Net -6984 +2694 +5           Stool Occurrence  1 x           Physical Exam  Constitutional:       General: She is not in acute distress.  Cardiovascular:      Rate and Rhythm: Normal rate.   Pulmonary:      Comments: 8L NC  Abdominal:      General: There is no distension.      Palpations: Abdomen is soft.      Tenderness: There is no abdominal tenderness.      Comments: G tube to gravity, bilious output  Exlap incision with stable closure c/d/i   Skin:     General: Skin is warm and dry.         Significant Labs:  CBC:   Recent Labs   Lab 09/11/20 0321   WBC 39.09*   RBC 3.47*   HGB 10.2*   HCT 31.1*   *   MCV 90   MCH 29.4   MCHC 32.8     CMP:   Recent Labs   Lab 09/11/20  0321 09/11/20  1218 09/11/20  1749   *  --   --    CALCIUM 7.8*  --   --    ALBUMIN 2.1* 2.0* 1.9*   PROT 5.0* 5.0* 4.9*   *  --   --    K 2.9* 3.2*  --    CO2 26  --   --      --   --    BUN 69*  --   --    CREATININE 1.6*  --   --    ALKPHOS 208* 196* 196*   * 98* 93*   * 136* 125*   BILITOT 2.1* 1.9* 2.1*       Significant Diagnostics:  I have reviewed all pertinent imaging results/findings within the past 24 hours.

## 2020-09-12 NOTE — PROGRESS NOTES
[facility]  General Surgery  Progress Note    Subjective:     History of Present Illness:  Ms. Hernandez is a 51yo F w/PMH of von Gierke disease s/p liver transplant (2002, on tacro + MMF, follows Dr. Nielsen), hx chronic rejection (bx 2015 with acute and chronic rejection s/p steroids), biliary stricture and ductopenic rejection, recently with cirrhosis on fibroscan (10/2019), HTN, and depression who presents as a transfer for further evaluation of gastric outlet obstruction and esophageal stricturing.  Patient decompensated requiring multiple pressors and intubation. CT scan showed free air. Prior to intubation patient reported severe abdominal pain.   states that patient has had epigastric pain, nausea, and vomiting for several days.     Post-Op Info:  Procedure(s) (LRB):  LAPAROTOMY, EXPLORATORY with abd closure (N/A)   6 Days Post-Op     Interval History: No acute events overnight.  Very high luekocytosis. No fever. Restarted zosyn and diflucan. G tube to gravity.     Medications:  Continuous Infusions:   dextrose 5 % 50 mL/hr at 09/12/20 1200     Scheduled Meds:   albuterol-ipratropium  3 mL Nebulization Q4H    fluconazole (DIFLUCAN) IVPB  400 mg Intravenous Daily    heparin (porcine)  5,000 Units Subcutaneous Q8H    lactulose  200 g Rectal TID    levothyroxine  40 mcg Intravenous Daily    nitroGLYCERIN 2% TD oint  0.5 inch Topical (Top) Q6H    pantoprazole  40 mg Intravenous Daily    piperacillin-tazobactam (ZOSYN) IVPB  4.5 g Intravenous Q12H    tacrolimus  0.5 mg Sublingual BID     PRN Meds:calcium gluconate IVPB, calcium gluconate IVPB, calcium gluconate IVPB, dextrose 50%, dextrose 50%, glucagon (human recombinant), glucose, glucose, hydrALAZINE, HYDROmorphone, insulin aspart U-100, labetaloL, magnesium sulfate IVPB, magnesium sulfate IVPB, melatonin, ondansetron, oxyCODONE-acetaminophen, potassium chloride in water **AND** potassium chloride in water **AND** potassium  chloride in water, promethazine, sodium chloride 0.9%, sodium chloride 0.9%, sodium phosphate IVPB, sodium phosphate IVPB, sodium phosphate IVPB     Review of patient's allergies indicates:   Allergen Reactions    Codeine Itching     Other reaction(s): Itching    Lipitor [atorvastatin] Other (See Comments)     Other reaction(s): Muscle pain  Muscle cranmps    Morphine Itching     Other reaction(s): nausea and vomiting     Zoloft [sertraline] Other (See Comments)     Tremors/muscle spasms     Objective:     Vital Signs (Most Recent):  Temp: 98 °F (36.7 °C) (09/12/20 1100)  Pulse: 105 (09/12/20 1219)  Resp: (!) 22 (09/12/20 1219)  BP: (!) 101/55 (09/12/20 1100)  SpO2: (!) 94 % (09/12/20 1219) Vital Signs (24h Range):  Temp:  [97.4 °F (36.3 °C)-98.9 °F (37.2 °C)] 98 °F (36.7 °C)  Pulse:  [] 105  Resp:  [16-63] 22  SpO2:  [90 %-100 %] 94 %  BP: ()/(50-78) 101/55     Weight: 73 kg (160 lb 15 oz)  Body mass index is 32.51 kg/m².    Intake/Output - Last 3 Shifts       09/10 0700 - 09/11 0659 09/11 0700 - 09/12 0659 09/12 0700 - 09/13 0659    P.O.  0     I.V. (mL/kg) 599 (8.2) 750 (10.3)     NG/GT 2080 490     IV Piggyback 1500      Total Intake(mL/kg) 4179 (57.2) 1240 (17)     Urine (mL/kg/hr) 1485 (0.8) 837 (0.5) 290 (0.6)    Drains  640 115    Stool 0 300 100    Total Output 1485 1777 505    Net +1704 -537 -505           Stool Occurrence 1 x 1 x 1 x          Physical Exam  Constitutional:       General: She is not in acute distress.  Cardiovascular:      Rate and Rhythm: Normal rate.   Pulmonary:      Comments: 8L NC  Abdominal:      General: There is no distension.      Palpations: Abdomen is soft.      Tenderness: There is no abdominal tenderness.      Comments: G tube to gravity, bilious output  Exlap incision with stable closure c/d/i   Skin:     General: Skin is warm and dry.         Significant Labs:  CBC:   Recent Labs   Lab 09/12/20  0400   WBC 45.52*   RBC 2.98*   HGB 8.6*   HCT 28.0*       MCV 94   MCH 28.9   MCHC 30.7*     CMP:   Recent Labs   Lab 09/12/20  0400   *   CALCIUM 7.8*   ALBUMIN 1.8*   PROT 4.6*   *   K 3.3*   CO2 27      BUN 71*   CREATININE 1.4   ALKPHOS 262*   ALT 88*   *   BILITOT 2.1*       Significant Diagnostics:  I have reviewed all pertinent imaging results/findings within the past 24 hours.    Assessment/Plan:     * Perforated abdominal viscus  50 y.o. female w/ free air on CT scan. S/p emergent ex lap on 9/4 w/ findings of gastric perforation, primary repair. Now 6 Days Post-Op from abdominal closure on 9/6, skin stapled closed, KERRY drains removed.     Continue SICU care  Leave G tube to gravity tonight. Will follow up WBC and output in AM. If improved, may consider restarting TF.  Zosyn and diflucan for suspected intraabodminal source.  May need IR drain into peritoneal fluid collection  Continue delirium precautions, leigh seroquel  Follow up blood culture taken today  Not on broad spectrum abx as clinically stable and not febrile - will continue to monitor        Constance Martin MD  General Surgery  [facility]

## 2020-09-12 NOTE — ASSESSMENT & PLAN NOTE
Neuro:  -sedation: none  -analgesia: PRN oxy. Dilauded  -discontinue Seroquel        CV:   - heparin gtt discontinued 9/9.   - HDS off of pressors    Pulm:  - HFNC @ 9L due to respiratory decline yesterday.  - Continue to wean as tolerated  - Duonebs Q6     FEN/GI: Perforated stomach; s/p washout and patch (9/4); abdominal closure (9/6)  - s/p washout and closure on 9/6   - Tube feeds held  - G tube open to gravity. Appx 550 output yesterday  - Lactulose enema Q8  - Daily metabolic panel with PRN repletion of electrolytes  - pantoprazole for ulcer ppx  - D5W @ 50/hr     Renal: Pt with JACQUE upon arrival; BUN/Cr: 32/2.1 (9/4)  - JACQUE improving  - Continue to monitor with daily metabolic labs  - nephrology on board, appreciating recs  - DC Bhardwaj  - BMP this afternoon     HEME/ID:   - 9/5: 2 U pRBC given with appropriate response  - Hb stable  - Daily CBC  - s/p Liver transplant in 2002; Daily tacrolimus level with AM labs. Per hepatology, restart tacrolimus 1mg BID  - Consult ID  - Diflucan and Zosyn initiated  - Lovenox for DVT ppx     Endo:  - Hx of hypothyroidism; not taking synthroid at home    Dispo:  - Continue SICU care

## 2020-09-12 NOTE — TREATMENT PLAN
Hepatology Treatment Plan    Elda Hernandez is a 50 y.o. female admitted to hospital 9/3/2020 (Hospital Day: 10) due to Perforated abdominal viscus.     Interval History  WBC up to 45K, no source of infection found. CT yesterday with possible ileus.  On zosyn and diflucan.  Cr 1.5  LFTs improving slightly.  Tac level 8.8, was decreased to 0.5 / 0.5 by primary team.    Objective  Temp:  [97.4 °F (36.3 °C)-98.9 °F (37.2 °C)] 98.6 °F (37 °C) (09/12 1500)  Pulse:  [] 103 (09/12 1615)  BP: ()/(50-78) 109/56 (09/12 1615)  Resp:  [16-51] 27 (09/12 1615)  SpO2:  [90 %-100 %] 97 % (09/12 1615)      Laboratory    MELD-Na score: 18 at 9/12/2020  2:39 PM  MELD score: 18 at 9/12/2020  2:39 PM  Calculated from:  Serum Creatinine: 1.5 mg/dL at 9/12/2020  2:39 PM  Serum Sodium: 146 mmol/L (Rounded to 137 mmol/L) at 9/12/2020  2:39 PM  Total Bilirubin: 2.1 mg/dL at 9/12/2020  4:00 AM  INR(ratio): 1.6 at 9/12/2020  4:00 AM  Age: 50 years 3 months    Recent Labs   Lab 09/10/20  0250 09/11/20  0321 09/12/20  0400   HGB 10.2* 10.2* 8.6*       Lab Results   Component Value Date    WBC 45.52 (H) 09/12/2020    HGB 8.6 (L) 09/12/2020    HCT 28.0 (L) 09/12/2020    MCV 94 09/12/2020     09/12/2020       Lab Results   Component Value Date     (H) 09/12/2020    K 4.4 09/12/2020     09/12/2020    CO2 25 09/12/2020    BUN 69 (H) 09/12/2020    CREATININE 1.5 (H) 09/12/2020    CALCIUM 7.7 (L) 09/12/2020    ANIONGAP 13 09/12/2020    ESTGFRAFRICA 45.8 (A) 09/12/2020    EGFRNONAA 39.8 (A) 09/12/2020       Lab Results   Component Value Date    ALT 88 (H) 09/12/2020     (H) 09/12/2020     (H) 04/07/2018    ALKPHOS 262 (H) 09/12/2020    BILITOT 2.1 (H) 09/12/2020       Lab Results   Component Value Date    INR 1.6 (H) 09/12/2020    INR 1.6 (H) 09/11/2020    INR 1.3 (H) 09/10/2020       Plan  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  Ms. Hernandez is a 49yo F w/PMH of von Gierke disease s/p liver  transplant (2002, on tacro + MMF, follows Dr. Nielsen), hx chronic rejection (bx 2015 with acute and chronic rejection s/p steroids), biliary stricture and ductopenic rejection, recently with cirrhosis on fibroscan (10/2019), HTN, and depression who presents as a transfer for further evaluation of gastric outlet obstruction and esophageal stricturing.  EGD 9/3 at OSH with severe esophageal stricturing so transferred for GI evaluation.  Course complicated by gastric perforation s/p repair.  Hepatology following for immunosuppression management.     Since surgery, has been extubated but still encephalopathic.  Has had worsening liver enzymes with stable bili / AP. U/S liver transplant unremarkable. Restarted tacrolimus. Kidney function is improving.     Tacrolimus level is therapeutic  Home dose is tacrolimus 2mg AM / 1mg PM and cellcept 500mg bid.     - continue tacrolimus 0.5mg AM and 0.5mg PM  - continue to hold cellcept in current critical state  - Continue lactulose (PO or rectal) if possible for encephalopathy with goal 3-4 BMs daily  - If LFTs improve, may be able to hold off on liver biopsy  - Check daily tacrolimus trough, CBC, CMP and INR    Thank you for involving us in the care of Elda Doran KATHY TimmonsMary. Please call with any additional questions, concerns or changes in the patient's clinical status.    Js Matias MD  Gastroenterology Fellow, PGY V

## 2020-09-12 NOTE — PROGRESS NOTES
[facility]  General Surgery  Progress Note    Subjective:     History of Present Illness:  Ms. Hernandez is a 51yo F w/PMH of von Gierke disease s/p liver transplant (2002, on tacro + MMF, follows Dr. Nielsen), hx chronic rejection (bx 2015 with acute and chronic rejection s/p steroids), biliary stricture and ductopenic rejection, recently with cirrhosis on fibroscan (10/2019), HTN, and depression who presents as a transfer for further evaluation of gastric outlet obstruction and esophageal stricturing.  Patient decompensated requiring multiple pressors and intubation. CT scan showed free air. Prior to intubation patient reported severe abdominal pain.   states that patient has had epigastric pain, nausea, and vomiting for several days.     Post-Op Info:  Procedure(s) (LRB):  LAPAROTOMY, EXPLORATORY with abd closure (N/A)   5 Days Post-Op     Interval History: Double in WBC today, almost 40. Sent for CT abdomen pelvis that showed small bowel distension up to 4cm, but no abscess or intraabdominal fluid collection. Desaturation to 80s earlier, went from 2L NC, up to 10L high flow. Now down to 8L humidified. G tube to gravity with about 200cc of bilious output.  Mental status much improved today - answering questions appropriately.     Medications:  Continuous Infusions:   dextrose 5 % 50 mL/hr at 09/11/20 1800     Scheduled Meds:   albuterol-ipratropium  3 mL Nebulization Q4H    heparin (porcine)  5,000 Units Subcutaneous Q8H    lactulose  200 g Rectal TID    levothyroxine  75 mcg Per G Tube Daily    nitroGLYCERIN 2% TD oint  0.5 inch Topical (Top) Q6H    pantoprazole  40 mg Intravenous Daily    tacrolimus  0.5 mg Sublingual BID     PRN Meds:calcium gluconate IVPB, calcium gluconate IVPB, calcium gluconate IVPB, dextrose 50%, dextrose 50%, glucagon (human recombinant), glucose, glucose, hydrALAZINE, insulin aspart U-100, labetaloL, magnesium sulfate IVPB, magnesium sulfate IVPB,  melatonin, ondansetron, oxyCODONE-acetaminophen, potassium chloride in water **AND** potassium chloride in water **AND** potassium chloride in water, promethazine, sodium chloride 0.9%, sodium chloride 0.9%, sodium phosphate IVPB, sodium phosphate IVPB, sodium phosphate IVPB     Review of patient's allergies indicates:   Allergen Reactions    Codeine Itching     Other reaction(s): Itching    Lipitor [atorvastatin] Other (See Comments)     Other reaction(s): Muscle pain  Muscle cranmps    Morphine Itching     Other reaction(s): nausea and vomiting     Zoloft [sertraline] Other (See Comments)     Tremors/muscle spasms     Objective:     Vital Signs (Most Recent):  Temp: 98.1 °F (36.7 °C) (09/11/20 1500)  Pulse: 109 (09/11/20 1907)  Resp: (!) 24 (09/11/20 1907)  BP: (!) 109/55 (09/11/20 1907)  SpO2: (!) 93 % (09/11/20 1907) Vital Signs (24h Range):  Temp:  [98.1 °F (36.7 °C)-98.5 °F (36.9 °C)] 98.1 °F (36.7 °C)  Pulse:  [101-123] 109  Resp:  [24-63] 24  SpO2:  [89 %-99 %] 93 %  BP: ()/(50-70) 109/55     Weight: 73 kg (160 lb 15 oz)  Body mass index is 32.51 kg/m².    Intake/Output - Last 3 Shifts       09/09 0700 - 09/10 0659 09/10 0700 - 09/11 0659 09/11 0700 - 09/12 0659    I.V. (mL/kg) 631 (9.4) 599 (8.2) 450 (6.2)    NG/GT 1040 2080 490    IV Piggyback  1500     Total Intake(mL/kg) 1671 (24.9) 4179 (57.2) 940 (12.9)    Urine (mL/kg/hr) 8655 (5.4) 1485 (0.8) 395 (0.4)    Drains   540    Stool  0     Total Output 8655 1485 935    Net -6984 +2694 +5           Stool Occurrence  1 x           Physical Exam  Constitutional:       General: She is not in acute distress.  Cardiovascular:      Rate and Rhythm: Normal rate.   Pulmonary:      Comments: 8L NC  Abdominal:      General: There is no distension.      Palpations: Abdomen is soft.      Tenderness: There is no abdominal tenderness.      Comments: G tube to gravity, bilious output  Exlap incision with stable closure c/d/i   Skin:     General: Skin is warm and  dry.         Significant Labs:  CBC:   Recent Labs   Lab 09/11/20  0321   WBC 39.09*   RBC 3.47*   HGB 10.2*   HCT 31.1*   *   MCV 90   MCH 29.4   MCHC 32.8     CMP:   Recent Labs   Lab 09/11/20  0321 09/11/20  1218 09/11/20  1749   *  --   --    CALCIUM 7.8*  --   --    ALBUMIN 2.1* 2.0* 1.9*   PROT 5.0* 5.0* 4.9*   *  --   --    K 2.9* 3.2*  --    CO2 26  --   --      --   --    BUN 69*  --   --    CREATININE 1.6*  --   --    ALKPHOS 208* 196* 196*   * 98* 93*   * 136* 125*   BILITOT 2.1* 1.9* 2.1*       Significant Diagnostics:  I have reviewed all pertinent imaging results/findings within the past 24 hours.    Assessment/Plan:     * Perforated abdominal viscus  50 y.o. female w/ free air on CT scan. S/p emergent ex lap on 9/4 w/ findings of gastric perforation, primary repair. Now 5 Days Post-Op from abdominal closure on 9/6, skin stapled closed, KERRY drains removed.     Continue SICU care  Leave G tube to gravity tonight. Will follow up WBC and output in AM. If improved, may consider restarting TF.  Continue delirium precautions, leigh seroquel  Follow up blood culture taken today  Not on broad spectrum abx as clinically stable and not febrile - will continue to monitor        Constance Martin MD  General Surgery  [facility]

## 2020-09-12 NOTE — ASSESSMENT & PLAN NOTE
50 y.o. female w/ free air on CT scan. S/p emergent ex lap on 9/4 w/ findings of gastric perforation, primary repair. Now 5 Days Post-Op from abdominal closure on 9/6, skin stapled closed, KERRY drains removed.     Continue SICU care  Leave G tube to gravity tonight. Will follow up WBC and output in AM. If improved, may consider restarting TF.  Continue delirium precautions, leigh seroquel  Follow up blood culture taken today  Not on broad spectrum abx as clinically stable and not febrile - will continue to monitor

## 2020-09-13 LAB
ALBUMIN SERPL BCP-MCNC: 1.7 G/DL (ref 3.5–5.2)
ALBUMIN SERPL BCP-MCNC: 1.7 G/DL (ref 3.5–5.2)
ALP SERPL-CCNC: 209 U/L (ref 55–135)
ALP SERPL-CCNC: 216 U/L (ref 55–135)
ALT SERPL W/O P-5'-P-CCNC: 72 U/L (ref 10–44)
ALT SERPL W/O P-5'-P-CCNC: 77 U/L (ref 10–44)
ANION GAP SERPL CALC-SCNC: 13 MMOL/L (ref 8–16)
ANION GAP SERPL CALC-SCNC: 14 MMOL/L (ref 8–16)
ANISOCYTOSIS BLD QL SMEAR: SLIGHT
ANISOCYTOSIS BLD QL SMEAR: SLIGHT
APTT BLDCRRT: 40.9 SEC (ref 21–32)
APTT BLDCRRT: 45.9 SEC (ref 21–32)
AST SERPL-CCNC: 80 U/L (ref 10–40)
AST SERPL-CCNC: 88 U/L (ref 10–40)
BASOPHILS # BLD AUTO: 0.04 K/UL (ref 0–0.2)
BASOPHILS # BLD AUTO: 0.07 K/UL (ref 0–0.2)
BASOPHILS NFR BLD: 0.2 % (ref 0–1.9)
BASOPHILS NFR BLD: 0.2 % (ref 0–1.9)
BILIRUB SERPL-MCNC: 2.5 MG/DL (ref 0.1–1)
BILIRUB SERPL-MCNC: 2.9 MG/DL (ref 0.1–1)
BUN SERPL-MCNC: 59 MG/DL (ref 6–20)
BUN SERPL-MCNC: 69 MG/DL (ref 6–20)
BURR CELLS BLD QL SMEAR: ABNORMAL
CALCIUM SERPL-MCNC: 7.8 MG/DL (ref 8.7–10.5)
CALCIUM SERPL-MCNC: 7.8 MG/DL (ref 8.7–10.5)
CHLORIDE SERPL-SCNC: 109 MMOL/L (ref 95–110)
CHLORIDE SERPL-SCNC: 111 MMOL/L (ref 95–110)
CO2 SERPL-SCNC: 24 MMOL/L (ref 23–29)
CO2 SERPL-SCNC: 25 MMOL/L (ref 23–29)
CREAT SERPL-MCNC: 1.6 MG/DL (ref 0.5–1.4)
CREAT SERPL-MCNC: 1.6 MG/DL (ref 0.5–1.4)
DIFFERENTIAL METHOD: ABNORMAL
DIFFERENTIAL METHOD: ABNORMAL
EOSINOPHIL # BLD AUTO: 0.2 K/UL (ref 0–0.5)
EOSINOPHIL # BLD AUTO: 0.3 K/UL (ref 0–0.5)
EOSINOPHIL NFR BLD: 0.6 % (ref 0–8)
EOSINOPHIL NFR BLD: 1 % (ref 0–8)
ERYTHROCYTE [DISTWIDTH] IN BLOOD BY AUTOMATED COUNT: 17.5 % (ref 11.5–14.5)
ERYTHROCYTE [DISTWIDTH] IN BLOOD BY AUTOMATED COUNT: 17.5 % (ref 11.5–14.5)
EST. GFR  (AFRICAN AMERICAN): 42.4 ML/MIN/1.73 M^2
EST. GFR  (AFRICAN AMERICAN): 42.4 ML/MIN/1.73 M^2
EST. GFR  (NON AFRICAN AMERICAN): 36.8 ML/MIN/1.73 M^2
EST. GFR  (NON AFRICAN AMERICAN): 36.8 ML/MIN/1.73 M^2
GLUCOSE SERPL-MCNC: 125 MG/DL (ref 70–110)
GLUCOSE SERPL-MCNC: 127 MG/DL (ref 70–110)
HCT VFR BLD AUTO: 26.9 % (ref 37–48.5)
HCT VFR BLD AUTO: 26.9 % (ref 37–48.5)
HGB BLD-MCNC: 8 G/DL (ref 12–16)
HGB BLD-MCNC: 8.4 G/DL (ref 12–16)
HYPOCHROMIA BLD QL SMEAR: ABNORMAL
HYPOCHROMIA BLD QL SMEAR: ABNORMAL
IMM GRANULOCYTES # BLD AUTO: 0.44 K/UL (ref 0–0.04)
IMM GRANULOCYTES # BLD AUTO: 1.21 K/UL (ref 0–0.04)
IMM GRANULOCYTES NFR BLD AUTO: 1.8 % (ref 0–0.5)
IMM GRANULOCYTES NFR BLD AUTO: 3.8 % (ref 0–0.5)
INR PPP: 1.5 (ref 0.8–1.2)
INR PPP: 1.5 (ref 0.8–1.2)
LYMPHOCYTES # BLD AUTO: 1.2 K/UL (ref 1–4.8)
LYMPHOCYTES # BLD AUTO: 1.3 K/UL (ref 1–4.8)
LYMPHOCYTES NFR BLD: 4.1 % (ref 18–48)
LYMPHOCYTES NFR BLD: 5 % (ref 18–48)
MAGNESIUM SERPL-MCNC: 2.2 MG/DL (ref 1.6–2.6)
MAGNESIUM SERPL-MCNC: 2.4 MG/DL (ref 1.6–2.6)
MCH RBC QN AUTO: 28.3 PG (ref 27–31)
MCH RBC QN AUTO: 29.2 PG (ref 27–31)
MCHC RBC AUTO-ENTMCNC: 29.7 G/DL (ref 32–36)
MCHC RBC AUTO-ENTMCNC: 31.2 G/DL (ref 32–36)
MCV RBC AUTO: 93 FL (ref 82–98)
MCV RBC AUTO: 95 FL (ref 82–98)
MONOCYTES # BLD AUTO: 2.1 K/UL (ref 0.3–1)
MONOCYTES # BLD AUTO: 2.1 K/UL (ref 0.3–1)
MONOCYTES NFR BLD: 6.7 % (ref 4–15)
MONOCYTES NFR BLD: 8.8 % (ref 4–15)
NEUTROPHILS # BLD AUTO: 20.3 K/UL (ref 1.8–7.7)
NEUTROPHILS # BLD AUTO: 26.8 K/UL (ref 1.8–7.7)
NEUTROPHILS NFR BLD: 83.2 % (ref 38–73)
NEUTROPHILS NFR BLD: 84.6 % (ref 38–73)
NRBC BLD-RTO: 1 /100 WBC
NRBC BLD-RTO: 1 /100 WBC
PHOSPHATE SERPL-MCNC: 3.4 MG/DL (ref 2.7–4.5)
PHOSPHATE SERPL-MCNC: 4 MG/DL (ref 2.7–4.5)
PLATELET # BLD AUTO: 206 K/UL (ref 150–350)
PLATELET # BLD AUTO: 212 K/UL (ref 150–350)
PLATELET BLD QL SMEAR: ABNORMAL
PMV BLD AUTO: 11 FL (ref 9.2–12.9)
PMV BLD AUTO: 11.7 FL (ref 9.2–12.9)
POCT GLUCOSE: 114 MG/DL (ref 70–110)
POCT GLUCOSE: 124 MG/DL (ref 70–110)
POCT GLUCOSE: 127 MG/DL (ref 70–110)
POCT GLUCOSE: 127 MG/DL (ref 70–110)
POCT GLUCOSE: 132 MG/DL (ref 70–110)
POIKILOCYTOSIS BLD QL SMEAR: SLIGHT
POLYCHROMASIA BLD QL SMEAR: ABNORMAL
POTASSIUM SERPL-SCNC: 3.7 MMOL/L (ref 3.5–5.1)
POTASSIUM SERPL-SCNC: 3.7 MMOL/L (ref 3.5–5.1)
PROT SERPL-MCNC: 4.8 G/DL (ref 6–8.4)
PROT SERPL-MCNC: 5 G/DL (ref 6–8.4)
PROTHROMBIN TIME: 16.1 SEC (ref 9–12.5)
PROTHROMBIN TIME: 16.4 SEC (ref 9–12.5)
RBC # BLD AUTO: 2.83 M/UL (ref 4–5.4)
RBC # BLD AUTO: 2.88 M/UL (ref 4–5.4)
SODIUM SERPL-SCNC: 148 MMOL/L (ref 136–145)
SODIUM SERPL-SCNC: 148 MMOL/L (ref 136–145)
TACROLIMUS BLD-MCNC: 11.6 NG/ML (ref 5–15)
TOXIC GRANULES BLD QL SMEAR: PRESENT
WBC # BLD AUTO: 24.38 K/UL (ref 3.9–12.7)
WBC # BLD AUTO: 31.7 K/UL (ref 3.9–12.7)

## 2020-09-13 PROCEDURE — 80053 COMPREHEN METABOLIC PANEL: CPT

## 2020-09-13 PROCEDURE — 25000242 PHARM REV CODE 250 ALT 637 W/ HCPCS: Performed by: SURGERY

## 2020-09-13 PROCEDURE — 94640 AIRWAY INHALATION TREATMENT: CPT

## 2020-09-13 PROCEDURE — 25000003 PHARM REV CODE 250: Performed by: STUDENT IN AN ORGANIZED HEALTH CARE EDUCATION/TRAINING PROGRAM

## 2020-09-13 PROCEDURE — 63600175 PHARM REV CODE 636 W HCPCS: Performed by: SURGERY

## 2020-09-13 PROCEDURE — 83735 ASSAY OF MAGNESIUM: CPT

## 2020-09-13 PROCEDURE — 84100 ASSAY OF PHOSPHORUS: CPT

## 2020-09-13 PROCEDURE — 94761 N-INVAS EAR/PLS OXIMETRY MLT: CPT

## 2020-09-13 PROCEDURE — 20000000 HC ICU ROOM

## 2020-09-13 PROCEDURE — 63600175 PHARM REV CODE 636 W HCPCS: Performed by: STUDENT IN AN ORGANIZED HEALTH CARE EDUCATION/TRAINING PROGRAM

## 2020-09-13 PROCEDURE — S0166 INJ OLANZAPINE 2.5MG: HCPCS | Performed by: STUDENT IN AN ORGANIZED HEALTH CARE EDUCATION/TRAINING PROGRAM

## 2020-09-13 PROCEDURE — 99233 SBSQ HOSP IP/OBS HIGH 50: CPT | Mod: 24,,, | Performed by: SURGERY

## 2020-09-13 PROCEDURE — 85610 PROTHROMBIN TIME: CPT | Mod: 91

## 2020-09-13 PROCEDURE — 99900035 HC TECH TIME PER 15 MIN (STAT)

## 2020-09-13 PROCEDURE — 80053 COMPREHEN METABOLIC PANEL: CPT | Mod: 91

## 2020-09-13 PROCEDURE — 80197 ASSAY OF TACROLIMUS: CPT | Mod: 91

## 2020-09-13 PROCEDURE — 85730 THROMBOPLASTIN TIME PARTIAL: CPT

## 2020-09-13 PROCEDURE — 85730 THROMBOPLASTIN TIME PARTIAL: CPT | Mod: 91

## 2020-09-13 PROCEDURE — 27000221 HC OXYGEN, UP TO 24 HOURS

## 2020-09-13 PROCEDURE — 80197 ASSAY OF TACROLIMUS: CPT

## 2020-09-13 PROCEDURE — 99233 PR SUBSEQUENT HOSPITAL CARE,LEVL III: ICD-10-PCS | Mod: 24,,, | Performed by: SURGERY

## 2020-09-13 PROCEDURE — 85025 COMPLETE CBC W/AUTO DIFF WBC: CPT | Mod: 91

## 2020-09-13 PROCEDURE — C9113 INJ PANTOPRAZOLE SODIUM, VIA: HCPCS | Performed by: STUDENT IN AN ORGANIZED HEALTH CARE EDUCATION/TRAINING PROGRAM

## 2020-09-13 PROCEDURE — 25000003 PHARM REV CODE 250: Performed by: SURGERY

## 2020-09-13 PROCEDURE — 85610 PROTHROMBIN TIME: CPT

## 2020-09-13 RX ORDER — OLANZAPINE 10 MG/2ML
5 INJECTION, POWDER, FOR SOLUTION INTRAMUSCULAR ONCE AS NEEDED
Status: COMPLETED | OUTPATIENT
Start: 2020-09-13 | End: 2020-09-13

## 2020-09-13 RX ORDER — OLANZAPINE 10 MG/2ML
5 INJECTION, POWDER, FOR SOLUTION INTRAMUSCULAR ONCE
Status: COMPLETED | OUTPATIENT
Start: 2020-09-13 | End: 2020-09-13

## 2020-09-13 RX ORDER — TACROLIMUS 0.5 MG/1
0.5 CAPSULE ORAL EVERY MORNING
Status: DISCONTINUED | OUTPATIENT
Start: 2020-09-14 | End: 2020-09-16

## 2020-09-13 RX ORDER — OLANZAPINE 5 MG/1
10 TABLET, ORALLY DISINTEGRATING ORAL ONCE
Status: COMPLETED | OUTPATIENT
Start: 2020-09-13 | End: 2020-09-13

## 2020-09-13 RX ADMIN — HYDROMORPHONE HYDROCHLORIDE 1 MG: 1 INJECTION, SOLUTION INTRAMUSCULAR; INTRAVENOUS; SUBCUTANEOUS at 02:09

## 2020-09-13 RX ADMIN — HEPARIN SODIUM 5000 UNITS: 5000 INJECTION INTRAVENOUS; SUBCUTANEOUS at 01:09

## 2020-09-13 RX ADMIN — IPRATROPIUM BROMIDE AND ALBUTEROL SULFATE 3 ML: .5; 2.5 SOLUTION RESPIRATORY (INHALATION) at 12:09

## 2020-09-13 RX ADMIN — HEPARIN SODIUM 5000 UNITS: 5000 INJECTION INTRAVENOUS; SUBCUTANEOUS at 09:09

## 2020-09-13 RX ADMIN — IPRATROPIUM BROMIDE AND ALBUTEROL SULFATE 3 ML: .5; 2.5 SOLUTION RESPIRATORY (INHALATION) at 04:09

## 2020-09-13 RX ADMIN — NITROGLYCERIN 0.5 INCH: 20 OINTMENT TOPICAL at 05:09

## 2020-09-13 RX ADMIN — HEPARIN SODIUM 5000 UNITS: 5000 INJECTION INTRAVENOUS; SUBCUTANEOUS at 05:09

## 2020-09-13 RX ADMIN — LACTULOSE 200 G: 10 SOLUTION ORAL at 04:09

## 2020-09-13 RX ADMIN — LACTULOSE 200 G: 10 SOLUTION ORAL at 10:09

## 2020-09-13 RX ADMIN — OLANZAPINE 5 MG: 10 INJECTION, POWDER, LYOPHILIZED, FOR SOLUTION INTRAMUSCULAR at 09:09

## 2020-09-13 RX ADMIN — PANTOPRAZOLE SODIUM 40 MG: 40 INJECTION, POWDER, LYOPHILIZED, FOR SOLUTION INTRAVENOUS at 10:09

## 2020-09-13 RX ADMIN — NITROGLYCERIN 0.5 INCH: 20 OINTMENT TOPICAL at 06:09

## 2020-09-13 RX ADMIN — NITROGLYCERIN 0.5 INCH: 20 OINTMENT TOPICAL at 12:09

## 2020-09-13 RX ADMIN — IPRATROPIUM BROMIDE AND ALBUTEROL SULFATE 3 ML: .5; 2.5 SOLUTION RESPIRATORY (INHALATION) at 08:09

## 2020-09-13 RX ADMIN — OLANZAPINE 5 MG: 10 INJECTION, POWDER, LYOPHILIZED, FOR SOLUTION INTRAMUSCULAR at 11:09

## 2020-09-13 RX ADMIN — NITROGLYCERIN 0.5 INCH: 20 OINTMENT TOPICAL at 01:09

## 2020-09-13 RX ADMIN — PIPERACILLIN SODIUM AND TAZOBACTAM SODIUM 4.5 G: 4; .5 INJECTION, POWDER, LYOPHILIZED, FOR SOLUTION INTRAVENOUS at 07:09

## 2020-09-13 RX ADMIN — PIPERACILLIN SODIUM AND TAZOBACTAM SODIUM 4.5 G: 4; .5 INJECTION, POWDER, LYOPHILIZED, FOR SOLUTION INTRAVENOUS at 09:09

## 2020-09-13 RX ADMIN — LEVOTHYROXINE SODIUM ANHYDROUS 40 MCG: 100 INJECTION, POWDER, LYOPHILIZED, FOR SOLUTION INTRAVENOUS at 10:09

## 2020-09-13 RX ADMIN — FLUCONAZOLE 400 MG: 2 INJECTION, SOLUTION INTRAVENOUS at 01:09

## 2020-09-13 RX ADMIN — IPRATROPIUM BROMIDE AND ALBUTEROL SULFATE 3 ML: .5; 2.5 SOLUTION RESPIRATORY (INHALATION) at 07:09

## 2020-09-13 RX ADMIN — POTASSIUM CHLORIDE 40 MEQ: 29.8 INJECTION, SOLUTION INTRAVENOUS at 05:09

## 2020-09-13 RX ADMIN — OLANZAPINE 10 MG: 5 TABLET, ORALLY DISINTEGRATING ORAL at 07:09

## 2020-09-13 RX ADMIN — IPRATROPIUM BROMIDE AND ALBUTEROL SULFATE 3 ML: .5; 2.5 SOLUTION RESPIRATORY (INHALATION) at 11:09

## 2020-09-13 NOTE — PROGRESS NOTES
[facility]  General Surgery  Progress Note    Subjective:     History of Present Illness:  Ms. Hernandez is a 51yo F w/PMH of von Gierke disease s/p liver transplant (2002, on tacro + MMF, follows Dr. Nielsen), hx chronic rejection (bx 2015 with acute and chronic rejection s/p steroids), biliary stricture and ductopenic rejection, recently with cirrhosis on fibroscan (10/2019), HTN, and depression who presents as a transfer for further evaluation of gastric outlet obstruction and esophageal stricturing.  Patient decompensated requiring multiple pressors and intubation. CT scan showed free air. Prior to intubation patient reported severe abdominal pain.   states that patient has had epigastric pain, nausea, and vomiting for several days.     Post-Op Info:  Procedure(s) (LRB):  LAPAROTOMY, EXPLORATORY with abd closure (N/A)   7 Days Post-Op     Interval History: leukocytosis improved to 31 from 45 after initiation of zosyn/diflucan. Belly pain improved. Minimal g tube output. BMS placed for loose stools     Medications:  Continuous Infusions:   dextrose 5 % 50 mL/hr at 09/13/20 0700     Scheduled Meds:   albuterol-ipratropium  3 mL Nebulization Q4H    fluconazole (DIFLUCAN) IVPB  400 mg Intravenous Daily    heparin (porcine)  5,000 Units Subcutaneous Q8H    lactulose  200 g Rectal TID    levothyroxine  40 mcg Intravenous Daily    nitroGLYCERIN 2% TD oint  0.5 inch Topical (Top) Q6H    pantoprazole  40 mg Intravenous Daily    piperacillin-tazobactam (ZOSYN) IVPB  4.5 g Intravenous Q12H    tacrolimus  0.5 mg Sublingual BID     PRN Meds:calcium gluconate IVPB, calcium gluconate IVPB, calcium gluconate IVPB, dextrose 50%, dextrose 50%, glucagon (human recombinant), glucose, glucose, hydrALAZINE, HYDROmorphone, insulin aspart U-100, labetaloL, magnesium sulfate IVPB, magnesium sulfate IVPB, melatonin, ondansetron, oxyCODONE-acetaminophen, potassium chloride in water **AND** potassium  chloride in water **AND** potassium chloride in water, promethazine, sodium chloride 0.9%, sodium chloride 0.9%, sodium phosphate IVPB, sodium phosphate IVPB, sodium phosphate IVPB     Review of patient's allergies indicates:   Allergen Reactions    Codeine Itching     Other reaction(s): Itching    Lipitor [atorvastatin] Other (See Comments)     Other reaction(s): Muscle pain  Muscle cranmps    Morphine Itching     Other reaction(s): nausea and vomiting     Zoloft [sertraline] Other (See Comments)     Tremors/muscle spasms     Objective:     Vital Signs (Most Recent):  Temp: 98.2 °F (36.8 °C) (09/13/20 0700)  Pulse: 109 (09/13/20 0730)  Resp: (!) 34 (09/13/20 0730)  BP: (!) 120/56 (09/13/20 0730)  SpO2: (!) 93 % (09/13/20 0730) Vital Signs (24h Range):  Temp:  [98 °F (36.7 °C)-98.6 °F (37 °C)] 98.2 °F (36.8 °C)  Pulse:  [] 109  Resp:  [17-51] 34  SpO2:  [90 %-100 %] 93 %  BP: ()/(50-60) 120/56     Weight: 68 kg (149 lb 14.6 oz)  Body mass index is 30.28 kg/m².    Intake/Output - Last 3 Shifts       09/11 0700 - 09/12 0659 09/12 0700 - 09/13 0659 09/13 0700 - 09/14 0659    P.O. 0      I.V. (mL/kg) 750 (10.3) 1744 (25.6)     NG/      IV Piggyback       Total Intake(mL/kg) 1240 (17) 1744 (25.6)     Urine (mL/kg/hr) 837 (0.5) 290 (0.2)     Drains 640 180 10    Stool 300 400     Total Output 1777 870 10    Net -537 +874 -10           Urine Occurrence  2 x     Stool Occurrence 1 x 3 x           Physical Exam  Constitutional:       General: She is not in acute distress.  Cardiovascular:      Rate and Rhythm: Normal rate.   Pulmonary:      Comments: 8L NC  Abdominal:      General: There is no distension.      Palpations: Abdomen is soft.      Tenderness: There is no abdominal tenderness.      Comments: G tube to gravity, bilious output  Exlap incision with stable closure c/d/i   Genitourinary:     Comments: bms in place  Skin:     General: Skin is warm and dry.         Significant Labs:  CBC:   Recent  Labs   Lab 09/13/20  0303   WBC 31.70*   RBC 2.88*   HGB 8.4*   HCT 26.9*      MCV 93   MCH 29.2   MCHC 31.2*     CMP:   Recent Labs   Lab 09/13/20  0303   *   CALCIUM 7.8*   ALBUMIN 1.7*   PROT 4.8*   *   K 3.7   CO2 25      BUN 69*   CREATININE 1.6*   ALKPHOS 209*   ALT 77*   AST 88*   BILITOT 2.5*       Significant Diagnostics:  I have reviewed all pertinent imaging results/findings within the past 24 hours.    Assessment/Plan:     * Perforated abdominal viscus  50 y.o. female w/ free air on CT scan. S/p emergent ex lap on 9/4 w/ findings of gastric perforation, primary repair. Now 7 Days Post-Op from abdominal closure on 9/6, skin stapled closed, KERRY drains removed.     Continue SICU care  Leave G tube to gravity tonight. Will follow up WBC and output in AM. If improved, may consider restarting TF.  Zosyn and diflucan for suspected intraabodminal source.  May need IR drain into peritoneal fluid collection  Continue delirium precautions, leigh seroquel  Follow up blood culture taken today          Constance Martin MD  General Surgery  [facility]

## 2020-09-13 NOTE — PROGRESS NOTES
[facility]  Critical Care - Surgery  Progress Note    Patient Name: Elda Hernandez  MRN: [id number]  Admission Date: 9/3/2020  Hospital Length of Stay: 10 days  Code Status: Full Code  Attending Provider: Clark Rodriguez MD  Primary Care Provider: Jordana Woods MD   Principal Problem: Perforated abdominal viscus    Subjective:     Hospital/ICU Course:  9/4: Pt admitted to SICU following ex lap for GI perf. Intubated, sedated. Wound vac in place.  Soon after arrival to unit a mottled appearance to RUE was noted.  Vascular consulted and US revealed R radial artery thrombosis.    9/5: Tachycardic..  R arm appearance greatly improved overnight.    9/6: Patient returned to OR for abdominal washout, closure. R arm with dopplerable signals to ulnar artery and palmar arch.   9/7: Attempted to wean patient off of sedation in an effort to move towards extubation. Patient weaned off of propofol. Precedex started. Patient's heart rate very labile and sensitize to sedation.   9/8: patient extubated  9/9: SHANICE. Labetalol PRN for HTN   9/10:  Diuresis increased.  Cr downtrending  9/11: Increased abdominal distension and elevated WBC count. CT abd  9/12: WBC to 45 today. Diflucan and zosyn initiated. Lactulose enema w/ improving mental status  9/13: WBC improved. Mental status improving. FWF initiated for hypernatremia.     Interval History/Significant Events: NAEO. WBC improved. Mental status improving. FWF initiated for hypernatremia.     Follow-up For: Procedure(s) (LRB):  LAPAROTOMY, EXPLORATORY with abd closure (N/A)    Post-Operative Day: 7 Days Post-Op    Objective:     Vital Signs (Most Recent):  Temp: 98.2 °F (36.8 °C) (09/13/20 0700)  Pulse: 104 (09/13/20 0858)  Resp: (!) 21 (09/13/20 0858)  BP: (!) 120/56 (09/13/20 0730)  SpO2: 100 % (09/13/20 0858) Vital Signs (24h Range):  Temp:  [98 °F (36.7 °C)-98.6 °F (37 °C)] 98.2 °F (36.8 °C)  Pulse:  [] 104  Resp:  [17-51] 21  SpO2:  [90 %-100 %]  100 %  BP: ()/(50-60) 120/56     Weight: 68 kg (149 lb 14.6 oz)  Body mass index is 30.28 kg/m².      Intake/Output Summary (Last 24 hours) at 9/13/2020 0928  Last data filed at 9/13/2020 0700  Gross per 24 hour   Intake 1744 ml   Output 605 ml   Net 1139 ml       Physical Exam  Vitals signs and nursing note reviewed.   Constitutional:       General: She is not in acute distress.  HENT:      Head: Normocephalic and atraumatic.      Nose: Nose normal.      Mouth/Throat:      Mouth: Mucous membranes are moist.   Eyes:      Extraocular Movements: Extraocular movements intact.   Neck:      Musculoskeletal: Normal range of motion.      Comments: RIJ central line  Cardiovascular:      Rate and Rhythm: Normal rate and regular rhythm.      Heart sounds: Normal heart sounds.   Pulmonary:      Breath sounds: Normal breath sounds. No wheezing.   Abdominal:      Comments: Midline incision with dressing in place; dressing dry, intact with old blood stain; G tube to gravity with moderate output   Musculoskeletal: Normal range of motion.   Skin:     General: Skin is warm and dry.      Coloration: Skin is not jaundiced or pale.      Comments: L peringuinal hematoma.  LLE pulses 2+.   Neurological:      General: No focal deficit present.      Mental Status: She is alert.      Comments: AAOx2         Vents:  Vent Mode: Spont/T (09/08/20 1607)  Ventilator Initiated: Yes (09/04/20 1130)  Set Rate: 14 BPM (09/08/20 1142)  Vt Set: 330 mL (09/08/20 1142)  Pressure Support: 5 cmH20 (09/08/20 1607)  PEEP/CPAP: 5 cmH20 (09/08/20 1607)  Oxygen Concentration (%): 40 (09/08/20 1142)  Peak Airway Pressure: 21 cmH2O (09/08/20 1142)  Plateau Pressure: 14 cmH20 (09/08/20 0850)  Total Ve: 4.54 mL (09/08/20 1142)  Negative Inspiratory Force (cm H2O): -35 (09/08/20 1607)  F/VT Ratio<105 (RSBI): (!) 43.21 (09/08/20 1142)    Lines/Drains/Airways     Central Venous Catheter Line            Percutaneous Central Line Insertion/Assessment - Triple  Lumen  09/04/20 0800 right internal jugular 9 days          Drain                 Gastrostomy/Enterostomy 09/04/20 LUQ decompression 9 days         Fecal Incontinence  09/12/20 0015 1 day    Female External Urinary Catheter 09/12/20 1115 less than 1 day                Significant Labs:    CBC/Anemia Profile:  Recent Labs   Lab 09/12/20  0400 09/13/20  0303   WBC 45.52* 31.70*   HGB 8.6* 8.4*   HCT 28.0* 26.9*    206   MCV 94 93   RDW 17.3* 17.5*        Chemistries:  Recent Labs   Lab 09/11/20  1749 09/12/20  0400 09/12/20  1439 09/13/20  0303   NA  --  149* 146* 148*   K  --  3.3* 4.4 3.7   CL  --  108 108 109   CO2  --  27 25 25   BUN  --  71* 69* 69*   CREATININE  --  1.4 1.5* 1.6*   CALCIUM  --  7.8* 7.7* 7.8*   ALBUMIN 1.9* 1.8*  --  1.7*   PROT 4.9* 4.6*  --  4.8*   BILITOT 2.1* 2.1*  --  2.5*   ALKPHOS 196* 262*  --  209*   ALT 93* 88*  --  77*   * 118*  --  88*   MG  --  2.2  --  2.4   PHOS  --  4.0  --  4.0       All pertinent labs within the past 24 hours have been reviewed.    Significant Imaging:  I have reviewed all pertinent imaging results/findings within the past 24 hours.    Assessment/Plan:     * Perforated abdominal viscus  Neuro:  -sedation: none  -analgesia: PRN oxy. Dilauded     CV:   - heparin gtt discontinued 9/9.   - HDS off of pressors    Pulm:  - HFNC  - Continue to wean as tolerated  - Duonebs Q6     FEN/GI: Perforated stomach; s/p washout and patch (9/4); abdominal closure (9/6)  - s/p washout and closure on 9/6   - Tube feeds held  - G tube open to gravity. Appx 550 output yesterday  - Lactulose enema Q8  - Daily metabolic panel with PRN repletion of electrolytes  - pantoprazole for ulcer ppx  - D5W @ 50/hr     Renal: Pt with JACQUE upon arrival; BUN/Cr: 32/2.1 (9/4)  - JACQUE improving  - Continue to monitor with daily metabolic labs  - nephrology on board, appreciating freedom Bhardwaj  - GODFREY this afternoon     HEME/ID:   - 9/5: 2 U pRBC given with appropriate  response  - Hb stable  - Daily CBC  - s/p Liver transplant in 2002; Daily tacrolimus level with AM labs. Per hepatology, restart tacrolimus 1mg BID  - Consult ID  - Diflucan and Zosyn  - Lovenox for DVT ppx     Endo:  - Hx of hypothyroidism; not taking synthroid at home    Dispo:  - Continue SICU care      Critical care was time spent personally by me on the following activities: development of treatment plan with patient or surrogate and bedside caregivers, discussions with consultants, evaluation of patient's response to treatment, examination of patient, ordering and performing treatments and interventions, ordering and review of laboratory studies, ordering and review of radiographic studies, pulse oximetry, re-evaluation of patient's condition.  This critical care time did not overlap with that of any other provider or involve time for any procedures.     Gurpreet Price MD  Critical Care - Surgery  [facility]

## 2020-09-13 NOTE — ASSESSMENT & PLAN NOTE
Neuro:  -sedation: none  -analgesia: PRN oxy. Dilauded     CV:   - heparin gtt discontinued 9/9.   - HDS off of pressors    Pulm:  - HFNC  - Continue to wean as tolerated  - Duonebs Q6     FEN/GI: Perforated stomach; s/p washout and patch (9/4); abdominal closure (9/6)  - s/p washout and closure on 9/6   - Tube feeds held  - G tube open to gravity. Appx 550 output yesterday  - Lactulose enema Q8  - Daily metabolic panel with PRN repletion of electrolytes  - pantoprazole for ulcer ppx  - D5W @ 50/hr     Renal: Pt with JACQUE upon arrival; BUN/Cr: 32/2.1 (9/4)  - JACQUE improving  - Continue to monitor with daily metabolic labs  - nephrology on board, appreciating recs  - AMANDA Bhardwaj  - BMP this afternoon     HEME/ID:   - 9/5: 2 U pRBC given with appropriate response  - Hb stable  - Daily CBC  - s/p Liver transplant in 2002; Daily tacrolimus level with AM labs. Per hepatology, restart tacrolimus 1mg BID  - Consult ID  - Diflucan and Zosyn  - Lovenox for DVT ppx     Endo:  - Hx of hypothyroidism; not taking synthroid at home    Dispo:  - Continue SICU care

## 2020-09-13 NOTE — ASSESSMENT & PLAN NOTE
50 y.o. female w/ free air on CT scan. S/p emergent ex lap on 9/4 w/ findings of gastric perforation, primary repair. Now 7 Days Post-Op from abdominal closure on 9/6, skin stapled closed, KERRY drains removed.     Continue SICU care  Leave G tube to gravity tonight. Will follow up WBC and output in AM. If improved, may consider restarting TF.  Zosyn and diflucan for suspected intraabodminal source.  May need IR drain into peritoneal fluid collection  Continue delirium precautions, leigh seroquel  Follow up blood culture taken today

## 2020-09-13 NOTE — SUBJECTIVE & OBJECTIVE
Interval History: leukocytosis improved to 31 from 45 after initiation of zosyn/diflucan. Belly pain improved. Minimal g tube output. BMS placed for loose stools     Medications:  Continuous Infusions:   dextrose 5 % 50 mL/hr at 09/13/20 0700     Scheduled Meds:   albuterol-ipratropium  3 mL Nebulization Q4H    fluconazole (DIFLUCAN) IVPB  400 mg Intravenous Daily    heparin (porcine)  5,000 Units Subcutaneous Q8H    lactulose  200 g Rectal TID    levothyroxine  40 mcg Intravenous Daily    nitroGLYCERIN 2% TD oint  0.5 inch Topical (Top) Q6H    pantoprazole  40 mg Intravenous Daily    piperacillin-tazobactam (ZOSYN) IVPB  4.5 g Intravenous Q12H    tacrolimus  0.5 mg Sublingual BID     PRN Meds:calcium gluconate IVPB, calcium gluconate IVPB, calcium gluconate IVPB, dextrose 50%, dextrose 50%, glucagon (human recombinant), glucose, glucose, hydrALAZINE, HYDROmorphone, insulin aspart U-100, labetaloL, magnesium sulfate IVPB, magnesium sulfate IVPB, melatonin, ondansetron, oxyCODONE-acetaminophen, potassium chloride in water **AND** potassium chloride in water **AND** potassium chloride in water, promethazine, sodium chloride 0.9%, sodium chloride 0.9%, sodium phosphate IVPB, sodium phosphate IVPB, sodium phosphate IVPB     Review of patient's allergies indicates:   Allergen Reactions    Codeine Itching     Other reaction(s): Itching    Lipitor [atorvastatin] Other (See Comments)     Other reaction(s): Muscle pain  Muscle cranmps    Morphine Itching     Other reaction(s): nausea and vomiting     Zoloft [sertraline] Other (See Comments)     Tremors/muscle spasms     Objective:     Vital Signs (Most Recent):  Temp: 98.2 °F (36.8 °C) (09/13/20 0700)  Pulse: 109 (09/13/20 0730)  Resp: (!) 34 (09/13/20 0730)  BP: (!) 120/56 (09/13/20 0730)  SpO2: (!) 93 % (09/13/20 0730) Vital Signs (24h Range):  Temp:  [98 °F (36.7 °C)-98.6 °F (37 °C)] 98.2 °F (36.8 °C)  Pulse:  [] 109  Resp:  [17-51] 34  SpO2:  [90  %-100 %] 93 %  BP: ()/(50-60) 120/56     Weight: 68 kg (149 lb 14.6 oz)  Body mass index is 30.28 kg/m².    Intake/Output - Last 3 Shifts       09/11 0700 - 09/12 0659 09/12 0700 - 09/13 0659 09/13 0700 - 09/14 0659    P.O. 0      I.V. (mL/kg) 750 (10.3) 1744 (25.6)     NG/      IV Piggyback       Total Intake(mL/kg) 1240 (17) 1744 (25.6)     Urine (mL/kg/hr) 837 (0.5) 290 (0.2)     Drains 640 180 10    Stool 300 400     Total Output 1777 870 10    Net -537 +874 -10           Urine Occurrence  2 x     Stool Occurrence 1 x 3 x           Physical Exam  Constitutional:       General: She is not in acute distress.  Cardiovascular:      Rate and Rhythm: Normal rate.   Pulmonary:      Comments: 8L NC  Abdominal:      General: There is no distension.      Palpations: Abdomen is soft.      Tenderness: There is no abdominal tenderness.      Comments: G tube to gravity, bilious output  Exlap incision with stable closure c/d/i   Genitourinary:     Comments: bms in place  Skin:     General: Skin is warm and dry.         Significant Labs:  CBC:   Recent Labs   Lab 09/13/20  0303   WBC 31.70*   RBC 2.88*   HGB 8.4*   HCT 26.9*      MCV 93   MCH 29.2   MCHC 31.2*     CMP:   Recent Labs   Lab 09/13/20  0303   *   CALCIUM 7.8*   ALBUMIN 1.7*   PROT 4.8*   *   K 3.7   CO2 25      BUN 69*   CREATININE 1.6*   ALKPHOS 209*   ALT 77*   AST 88*   BILITOT 2.5*       Significant Diagnostics:  I have reviewed all pertinent imaging results/findings within the past 24 hours.

## 2020-09-13 NOTE — SUBJECTIVE & OBJECTIVE
Interval History/Significant Events: NAEO. WBC improved. Mental status improving. FWF initiated for hypernatremia.     Follow-up For: Procedure(s) (LRB):  LAPAROTOMY, EXPLORATORY with abd closure (N/A)    Post-Operative Day: 7 Days Post-Op    Objective:     Vital Signs (Most Recent):  Temp: 98.2 °F (36.8 °C) (09/13/20 0700)  Pulse: 104 (09/13/20 0858)  Resp: (!) 21 (09/13/20 0858)  BP: (!) 120/56 (09/13/20 0730)  SpO2: 100 % (09/13/20 0858) Vital Signs (24h Range):  Temp:  [98 °F (36.7 °C)-98.6 °F (37 °C)] 98.2 °F (36.8 °C)  Pulse:  [] 104  Resp:  [17-51] 21  SpO2:  [90 %-100 %] 100 %  BP: ()/(50-60) 120/56     Weight: 68 kg (149 lb 14.6 oz)  Body mass index is 30.28 kg/m².      Intake/Output Summary (Last 24 hours) at 9/13/2020 0928  Last data filed at 9/13/2020 0700  Gross per 24 hour   Intake 1744 ml   Output 605 ml   Net 1139 ml       Physical Exam  Vitals signs and nursing note reviewed.   Constitutional:       General: She is not in acute distress.  HENT:      Head: Normocephalic and atraumatic.      Nose: Nose normal.      Mouth/Throat:      Mouth: Mucous membranes are moist.   Eyes:      Extraocular Movements: Extraocular movements intact.   Neck:      Musculoskeletal: Normal range of motion.      Comments: RIJ central line  Cardiovascular:      Rate and Rhythm: Normal rate and regular rhythm.      Heart sounds: Normal heart sounds.   Pulmonary:      Breath sounds: Normal breath sounds. No wheezing.   Abdominal:      Comments: Midline incision with dressing in place; dressing dry, intact with old blood stain; G tube to gravity with moderate output   Musculoskeletal: Normal range of motion.   Skin:     General: Skin is warm and dry.      Coloration: Skin is not jaundiced or pale.      Comments: L peringuinal hematoma.  LLE pulses 2+.   Neurological:      General: No focal deficit present.      Mental Status: She is alert.      Comments: AAOx2         Vents:  Vent Mode: Spont/T (09/08/20  1607)  Ventilator Initiated: Yes (09/04/20 1130)  Set Rate: 14 BPM (09/08/20 1142)  Vt Set: 330 mL (09/08/20 1142)  Pressure Support: 5 cmH20 (09/08/20 1607)  PEEP/CPAP: 5 cmH20 (09/08/20 1607)  Oxygen Concentration (%): 40 (09/08/20 1142)  Peak Airway Pressure: 21 cmH2O (09/08/20 1142)  Plateau Pressure: 14 cmH20 (09/08/20 0850)  Total Ve: 4.54 mL (09/08/20 1142)  Negative Inspiratory Force (cm H2O): -35 (09/08/20 1607)  F/VT Ratio<105 (RSBI): (!) 43.21 (09/08/20 1142)    Lines/Drains/Airways     Central Venous Catheter Line            Percutaneous Central Line Insertion/Assessment - Triple Lumen  09/04/20 0800 right internal jugular 9 days          Drain                 Gastrostomy/Enterostomy 09/04/20 LUQ decompression 9 days         Fecal Incontinence  09/12/20 0015 1 day    Female External Urinary Catheter 09/12/20 1115 less than 1 day                Significant Labs:    CBC/Anemia Profile:  Recent Labs   Lab 09/12/20  0400 09/13/20  0303   WBC 45.52* 31.70*   HGB 8.6* 8.4*   HCT 28.0* 26.9*    206   MCV 94 93   RDW 17.3* 17.5*        Chemistries:  Recent Labs   Lab 09/11/20  1749 09/12/20  0400 09/12/20  1439 09/13/20  0303   NA  --  149* 146* 148*   K  --  3.3* 4.4 3.7   CL  --  108 108 109   CO2  --  27 25 25   BUN  --  71* 69* 69*   CREATININE  --  1.4 1.5* 1.6*   CALCIUM  --  7.8* 7.7* 7.8*   ALBUMIN 1.9* 1.8*  --  1.7*   PROT 4.9* 4.6*  --  4.8*   BILITOT 2.1* 2.1*  --  2.5*   ALKPHOS 196* 262*  --  209*   ALT 93* 88*  --  77*   * 118*  --  88*   MG  --  2.2  --  2.4   PHOS  --  4.0  --  4.0       All pertinent labs within the past 24 hours have been reviewed.    Significant Imaging:  I have reviewed all pertinent imaging results/findings within the past 24 hours.

## 2020-09-13 NOTE — TREATMENT PLAN
Hepatology Treatment Plan    Elda Hernandez is a 50 y.o. female admitted to hospital 9/3/2020 (Hospital Day: 11) due to Perforated abdominal viscus. Hepatology following for immunosuppression management.    Lab Results   Component Value Date    TACROLIMUS 11.6 09/13/2020    TACROLIMUS 8.8 09/12/2020    TACROLIMUS 7.0 09/11/2020       Lab Results   Component Value Date    CREATININE 1.6 (H) 09/13/2020    CREATININE 1.5 (H) 09/12/2020    CREATININE 1.4 09/12/2020       Lab Results   Component Value Date    ALT 77 (H) 09/13/2020    AST 88 (H) 09/13/2020     (H) 04/07/2018    ALKPHOS 209 (H) 09/13/2020    BILITOT 2.5 (H) 09/13/2020    WBC 31.70 (H) 09/13/2020    HGB 8.4 (L) 09/13/2020     09/13/2020       Kidney function is worsening  Liver enzymes are improving    Plan  - Changes to immunosuppression regimen as below  - Plan to hold off on liver biopsy given improving LFTs, initial elevation likely from ischemia  - Continue lactulose (PO or rectal) if possible for encephalopathy with goal 3-4 BMs daily    Immunosuppression Regimen:  Tacrolimus: 0.5mg daily   Cellcept: continue to hold in current critical state      Please notify hepatology on day of discharge to discuss discharge medications and follow up.     Please obtain daily CBC, BMP, LFT, INR, immunosuppressant trough level    Thank you for involving us in the care of Elda Hernandez. Please call with any additional concerns or questions.    Js Matias MD  Gastroenterology Fellow
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
## 2020-09-14 LAB
ALBUMIN SERPL BCP-MCNC: 1.7 G/DL (ref 3.5–5.2)
ALBUMIN SERPL BCP-MCNC: 1.7 G/DL (ref 3.5–5.2)
ALP SERPL-CCNC: 255 U/L (ref 55–135)
ALP SERPL-CCNC: 255 U/L (ref 55–135)
ALT SERPL W/O P-5'-P-CCNC: 73 U/L (ref 10–44)
ALT SERPL W/O P-5'-P-CCNC: 73 U/L (ref 10–44)
ANION GAP SERPL CALC-SCNC: 12 MMOL/L (ref 8–16)
ANION GAP SERPL CALC-SCNC: 12 MMOL/L (ref 8–16)
APTT BLDCRRT: 46.1 SEC (ref 21–32)
AST SERPL-CCNC: 83 U/L (ref 10–40)
AST SERPL-CCNC: 83 U/L (ref 10–40)
BACTERIA SPEC AEROBE CULT: ABNORMAL
BASOPHILS # BLD AUTO: 0.03 K/UL (ref 0–0.2)
BASOPHILS NFR BLD: 0.2 % (ref 0–1.9)
BILIRUB SERPL-MCNC: 2.8 MG/DL (ref 0.1–1)
BILIRUB SERPL-MCNC: 2.8 MG/DL (ref 0.1–1)
BUN SERPL-MCNC: 56 MG/DL (ref 6–20)
BUN SERPL-MCNC: 56 MG/DL (ref 6–20)
CALCIUM SERPL-MCNC: 7.8 MG/DL (ref 8.7–10.5)
CALCIUM SERPL-MCNC: 7.8 MG/DL (ref 8.7–10.5)
CHLORIDE SERPL-SCNC: 110 MMOL/L (ref 95–110)
CHLORIDE SERPL-SCNC: 110 MMOL/L (ref 95–110)
CO2 SERPL-SCNC: 24 MMOL/L (ref 23–29)
CO2 SERPL-SCNC: 24 MMOL/L (ref 23–29)
CREAT SERPL-MCNC: 1.5 MG/DL (ref 0.5–1.4)
CREAT SERPL-MCNC: 1.5 MG/DL (ref 0.5–1.4)
DIFFERENTIAL METHOD: ABNORMAL
EOSINOPHIL # BLD AUTO: 0.2 K/UL (ref 0–0.5)
EOSINOPHIL NFR BLD: 1.2 % (ref 0–8)
ERYTHROCYTE [DISTWIDTH] IN BLOOD BY AUTOMATED COUNT: 17.2 % (ref 11.5–14.5)
EST. GFR  (AFRICAN AMERICAN): 45.8 ML/MIN/1.73 M^2
EST. GFR  (AFRICAN AMERICAN): 45.8 ML/MIN/1.73 M^2
EST. GFR  (NON AFRICAN AMERICAN): 39.8 ML/MIN/1.73 M^2
EST. GFR  (NON AFRICAN AMERICAN): 39.8 ML/MIN/1.73 M^2
GLUCOSE SERPL-MCNC: 125 MG/DL (ref 70–110)
GLUCOSE SERPL-MCNC: 125 MG/DL (ref 70–110)
GRAM STN SPEC: ABNORMAL
HCT VFR BLD AUTO: 26.2 % (ref 37–48.5)
HGB BLD-MCNC: 8.1 G/DL (ref 12–16)
IMM GRANULOCYTES # BLD AUTO: 0.24 K/UL (ref 0–0.04)
IMM GRANULOCYTES NFR BLD AUTO: 1.2 % (ref 0–0.5)
INR PPP: 1.4 (ref 0.8–1.2)
LYMPHOCYTES # BLD AUTO: 1 K/UL (ref 1–4.8)
LYMPHOCYTES NFR BLD: 5.2 % (ref 18–48)
MAGNESIUM SERPL-MCNC: 2.2 MG/DL (ref 1.6–2.6)
MCH RBC QN AUTO: 29 PG (ref 27–31)
MCHC RBC AUTO-ENTMCNC: 30.9 G/DL (ref 32–36)
MCV RBC AUTO: 94 FL (ref 82–98)
MONOCYTES # BLD AUTO: 2 K/UL (ref 0.3–1)
MONOCYTES NFR BLD: 9.9 % (ref 4–15)
NEUTROPHILS # BLD AUTO: 16.4 K/UL (ref 1.8–7.7)
NEUTROPHILS NFR BLD: 82.3 % (ref 38–73)
NRBC BLD-RTO: 1 /100 WBC
PHOSPHATE SERPL-MCNC: 3.7 MG/DL (ref 2.7–4.5)
PLATELET # BLD AUTO: 190 K/UL (ref 150–350)
PMV BLD AUTO: 11.2 FL (ref 9.2–12.9)
POCT GLUCOSE: 103 MG/DL (ref 70–110)
POCT GLUCOSE: 129 MG/DL (ref 70–110)
POCT GLUCOSE: 131 MG/DL (ref 70–110)
POCT GLUCOSE: 137 MG/DL (ref 70–110)
POCT GLUCOSE: 99 MG/DL (ref 70–110)
POTASSIUM SERPL-SCNC: 3.6 MMOL/L (ref 3.5–5.1)
POTASSIUM SERPL-SCNC: 3.6 MMOL/L (ref 3.5–5.1)
PROT SERPL-MCNC: 5.1 G/DL (ref 6–8.4)
PROT SERPL-MCNC: 5.1 G/DL (ref 6–8.4)
PROTHROMBIN TIME: 15.6 SEC (ref 9–12.5)
RBC # BLD AUTO: 2.79 M/UL (ref 4–5.4)
SODIUM SERPL-SCNC: 146 MMOL/L (ref 136–145)
SODIUM SERPL-SCNC: 146 MMOL/L (ref 136–145)
TACROLIMUS BLD-MCNC: 7 NG/ML (ref 5–15)
TACROLIMUS BLD-MCNC: 7.8 NG/ML (ref 5–15)
WBC # BLD AUTO: 19.96 K/UL (ref 3.9–12.7)

## 2020-09-14 PROCEDURE — 63600175 PHARM REV CODE 636 W HCPCS: Performed by: STUDENT IN AN ORGANIZED HEALTH CARE EDUCATION/TRAINING PROGRAM

## 2020-09-14 PROCEDURE — 25000003 PHARM REV CODE 250: Performed by: STUDENT IN AN ORGANIZED HEALTH CARE EDUCATION/TRAINING PROGRAM

## 2020-09-14 PROCEDURE — 63600175 PHARM REV CODE 636 W HCPCS: Performed by: SURGERY

## 2020-09-14 PROCEDURE — 30200315 PPD INTRADERMAL TEST REV CODE 302: Performed by: SURGERY

## 2020-09-14 PROCEDURE — 85730 THROMBOPLASTIN TIME PARTIAL: CPT

## 2020-09-14 PROCEDURE — A4217 STERILE WATER/SALINE, 500 ML: HCPCS | Performed by: STUDENT IN AN ORGANIZED HEALTH CARE EDUCATION/TRAINING PROGRAM

## 2020-09-14 PROCEDURE — 85025 COMPLETE CBC W/AUTO DIFF WBC: CPT

## 2020-09-14 PROCEDURE — 94761 N-INVAS EAR/PLS OXIMETRY MLT: CPT

## 2020-09-14 PROCEDURE — 99291 PR CRITICAL CARE, E/M 30-74 MINUTES: ICD-10-PCS | Mod: 24,GC,, | Performed by: SURGERY

## 2020-09-14 PROCEDURE — 97166 OT EVAL MOD COMPLEX 45 MIN: CPT

## 2020-09-14 PROCEDURE — C9113 INJ PANTOPRAZOLE SODIUM, VIA: HCPCS | Performed by: STUDENT IN AN ORGANIZED HEALTH CARE EDUCATION/TRAINING PROGRAM

## 2020-09-14 PROCEDURE — 86580 TB INTRADERMAL TEST: CPT | Performed by: SURGERY

## 2020-09-14 PROCEDURE — 99291 CRITICAL CARE FIRST HOUR: CPT | Mod: 24,GC,, | Performed by: SURGERY

## 2020-09-14 PROCEDURE — 27000221 HC OXYGEN, UP TO 24 HOURS

## 2020-09-14 PROCEDURE — 80053 COMPREHEN METABOLIC PANEL: CPT

## 2020-09-14 PROCEDURE — 97162 PT EVAL MOD COMPLEX 30 MIN: CPT

## 2020-09-14 PROCEDURE — 25000003 PHARM REV CODE 250: Performed by: SURGERY

## 2020-09-14 PROCEDURE — 20000000 HC ICU ROOM

## 2020-09-14 PROCEDURE — 84100 ASSAY OF PHOSPHORUS: CPT

## 2020-09-14 PROCEDURE — 99232 PR SUBSEQUENT HOSPITAL CARE,LEVL II: ICD-10-PCS | Mod: ,,, | Performed by: INTERNAL MEDICINE

## 2020-09-14 PROCEDURE — 25500020 PHARM REV CODE 255: Performed by: STUDENT IN AN ORGANIZED HEALTH CARE EDUCATION/TRAINING PROGRAM

## 2020-09-14 PROCEDURE — 99232 SBSQ HOSP IP/OBS MODERATE 35: CPT | Mod: ,,, | Performed by: INTERNAL MEDICINE

## 2020-09-14 PROCEDURE — 85610 PROTHROMBIN TIME: CPT

## 2020-09-14 PROCEDURE — 83735 ASSAY OF MAGNESIUM: CPT

## 2020-09-14 PROCEDURE — 80197 ASSAY OF TACROLIMUS: CPT

## 2020-09-14 PROCEDURE — B4185 PARENTERAL SOL 10 GM LIPIDS: HCPCS | Performed by: STUDENT IN AN ORGANIZED HEALTH CARE EDUCATION/TRAINING PROGRAM

## 2020-09-14 PROCEDURE — 94640 AIRWAY INHALATION TREATMENT: CPT

## 2020-09-14 PROCEDURE — 25000242 PHARM REV CODE 250 ALT 637 W/ HCPCS: Performed by: SURGERY

## 2020-09-14 PROCEDURE — 99900035 HC TECH TIME PER 15 MIN (STAT)

## 2020-09-14 PROCEDURE — 63600175 PHARM REV CODE 636 W HCPCS: Mod: JG | Performed by: STUDENT IN AN ORGANIZED HEALTH CARE EDUCATION/TRAINING PROGRAM

## 2020-09-14 RX ORDER — OLANZAPINE 10 MG/2ML
5 INJECTION, POWDER, FOR SOLUTION INTRAMUSCULAR ONCE AS NEEDED
Status: DISCONTINUED | OUTPATIENT
Start: 2020-09-14 | End: 2020-09-14

## 2020-09-14 RX ORDER — DEXTROSE MONOHYDRATE 50 MG/ML
INJECTION, SOLUTION INTRAVENOUS CONTINUOUS
Status: ACTIVE | OUTPATIENT
Start: 2020-09-14 | End: 2020-09-14

## 2020-09-14 RX ORDER — ACETAMINOPHEN 650 MG/1
650 SUPPOSITORY RECTAL EVERY 6 HOURS
Status: DISCONTINUED | OUTPATIENT
Start: 2020-09-14 | End: 2020-09-15

## 2020-09-14 RX ORDER — FLUCONAZOLE 2 MG/ML
200 INJECTION, SOLUTION INTRAVENOUS DAILY
Status: DISCONTINUED | OUTPATIENT
Start: 2020-09-14 | End: 2020-09-18

## 2020-09-14 RX ORDER — HALOPERIDOL 5 MG/ML
5 INJECTION INTRAMUSCULAR EVERY 6 HOURS PRN
Status: DISCONTINUED | OUTPATIENT
Start: 2020-09-14 | End: 2020-09-30

## 2020-09-14 RX ORDER — OLANZAPINE 5 MG/1
10 TABLET, ORALLY DISINTEGRATING ORAL NIGHTLY
Status: DISCONTINUED | OUTPATIENT
Start: 2020-09-14 | End: 2020-09-15

## 2020-09-14 RX ADMIN — ALTEPLASE 2 MG: 2.2 INJECTION, POWDER, LYOPHILIZED, FOR SOLUTION INTRAVENOUS at 05:09

## 2020-09-14 RX ADMIN — POTASSIUM CHLORIDE 20 MEQ: 29.8 INJECTION, SOLUTION INTRAVENOUS at 05:09

## 2020-09-14 RX ADMIN — IPRATROPIUM BROMIDE AND ALBUTEROL SULFATE 3 ML: .5; 2.5 SOLUTION RESPIRATORY (INHALATION) at 08:09

## 2020-09-14 RX ADMIN — DIATRIZOATE MEGLUMINE AND DIATRIZOATE SODIUM 25 ML: 660; 100 LIQUID ORAL; RECTAL at 06:09

## 2020-09-14 RX ADMIN — MAGNESIUM SULFATE HEPTAHYDRATE: 500 INJECTION, SOLUTION INTRAMUSCULAR; INTRAVENOUS at 09:09

## 2020-09-14 RX ADMIN — LEVOTHYROXINE SODIUM ANHYDROUS 40 MCG: 100 INJECTION, POWDER, LYOPHILIZED, FOR SOLUTION INTRAVENOUS at 08:09

## 2020-09-14 RX ADMIN — PIPERACILLIN SODIUM AND TAZOBACTAM SODIUM 4.5 G: 4; .5 INJECTION, POWDER, LYOPHILIZED, FOR SOLUTION INTRAVENOUS at 03:09

## 2020-09-14 RX ADMIN — PIPERACILLIN SODIUM AND TAZOBACTAM SODIUM 4.5 G: 4; .5 INJECTION, POWDER, LYOPHILIZED, FOR SOLUTION INTRAVENOUS at 07:09

## 2020-09-14 RX ADMIN — IPRATROPIUM BROMIDE AND ALBUTEROL SULFATE 3 ML: .5; 2.5 SOLUTION RESPIRATORY (INHALATION) at 03:09

## 2020-09-14 RX ADMIN — NITROGLYCERIN 0.5 INCH: 20 OINTMENT TOPICAL at 12:09

## 2020-09-14 RX ADMIN — PANTOPRAZOLE SODIUM 40 MG: 40 INJECTION, POWDER, LYOPHILIZED, FOR SOLUTION INTRAVENOUS at 08:09

## 2020-09-14 RX ADMIN — TUBERCULIN PURIFIED PROTEIN DERIVATIVE 5 UNITS: 5 INJECTION, SOLUTION INTRADERMAL at 03:09

## 2020-09-14 RX ADMIN — OLANZAPINE 10 MG: 5 TABLET, ORALLY DISINTEGRATING ORAL at 08:09

## 2020-09-14 RX ADMIN — HEPARIN SODIUM 5000 UNITS: 5000 INJECTION INTRAVENOUS; SUBCUTANEOUS at 10:09

## 2020-09-14 RX ADMIN — IPRATROPIUM BROMIDE AND ALBUTEROL SULFATE 3 ML: .5; 2.5 SOLUTION RESPIRATORY (INHALATION) at 07:09

## 2020-09-14 RX ADMIN — HEPARIN SODIUM 5000 UNITS: 5000 INJECTION INTRAVENOUS; SUBCUTANEOUS at 02:09

## 2020-09-14 RX ADMIN — DEXTROSE: 5 SOLUTION INTRAVENOUS at 11:09

## 2020-09-14 RX ADMIN — IPRATROPIUM BROMIDE AND ALBUTEROL SULFATE 3 ML: .5; 2.5 SOLUTION RESPIRATORY (INHALATION) at 12:09

## 2020-09-14 RX ADMIN — NITROGLYCERIN 0.5 INCH: 20 OINTMENT TOPICAL at 01:09

## 2020-09-14 RX ADMIN — NITROGLYCERIN 0.5 INCH: 20 OINTMENT TOPICAL at 05:09

## 2020-09-14 RX ADMIN — I.V. FAT EMULSION 250 ML: 20 EMULSION INTRAVENOUS at 09:09

## 2020-09-14 RX ADMIN — HALOPERIDOL LACTATE 5 MG: 5 INJECTION, SOLUTION INTRAMUSCULAR at 08:09

## 2020-09-14 RX ADMIN — FLUCONAZOLE 200 MG: 2 INJECTION, SOLUTION INTRAVENOUS at 09:09

## 2020-09-14 NOTE — PROGRESS NOTES
[facility]  Nephrology  Progress Note    Patient Name: Elda Hernandez  MRN: [id number]  Admission Date: 9/3/2020  Hospital Length of Stay: 11 days  Attending Provider: Clark Rodriguez MD   Primary Care Physician: Jordana Woods MD  Principal Problem:Perforated abdominal viscus    Subjective:     HPI: 50 y.o. female that has a past medical history of Angiolipoma of kidney (10/1/2018), Arnold-Chiari malformation, Depression, Esophageal stricture, Essential tremor, Hypertension, Liver fibrosis, Osteoporosis, Recurrent urinary tract infection, Seizures, SIADH (syndrome of inappropriate ADH production), Squamous cell carcinoma (10/2014), and Von Gierke disease s/p liver transplant (2002, on tacro + MMF), HTN, and depression that presented to OSH due to worsening diffuse abdominal for 3 weeks, associated with nausea, vomiting and decreased PO intake. Abdominal CT scan showed gastric outlet narrowing, obstructive-related changes at pyloric-duodenal junction, and thickening of GE junction. EGD on 9/3 found with grade IV esophagitis with stricture. Pt transferred for GI evaluation and found with hypotension requiring vasopressors and lactic Acid of 3.9. Repeat CT showed decompressed abdomen and evidence of free air. Exploratory laparotomy performed and found with stomach perforation s/p washout, gastric perforation repair, and gastrostomy tube placement.  Nephrology consulted for JACQUE    Interval History: Patient seen and examined at bedside, on oxygen therapy. Hypernatremia improving.     Review of patient's allergies indicates:   Allergen Reactions    Codeine Itching     Other reaction(s): Itching    Lipitor [atorvastatin] Other (See Comments)     Other reaction(s): Muscle pain  Muscle cranmps    Morphine Itching     Other reaction(s): nausea and vomiting     Zoloft [sertraline] Other (See Comments)     Tremors/muscle spasms     Current Facility-Administered Medications   Medication Frequency     acetaminophen suppository 650 mg Q6H    albuterol-ipratropium 2.5 mg-0.5 mg/3 mL nebulizer solution 3 mL Q4H    calcium gluconate 1g in dextrose 5% 100mL (ready to mix system) PRN    calcium gluconate 2 g in dextrose 5 % 100 mL IVPB PRN    calcium gluconate 3 g in dextrose 5 % 100 mL IVPB PRN    dextrose 5 % infusion Continuous    dextrose 50% injection 12.5 g PRN    dextrose 50% injection 25 g PRN    diatrizoate meglumineand-diatrizoate sodium (GASTROGRAFFIN) solution 25 mL Once    fat emulsion 20% infusion 250 mL Daily    fluconazole (DIFLUCAN) IVPB 200 mg 100 mL Daily    glucagon (human recombinant) injection 1 mg PRN    glucose chewable tablet 16 g PRN    glucose chewable tablet 24 g PRN    heparin (porcine) injection 5,000 Units Q8H    hydrALAZINE injection 10 mg Q8H PRN    insulin aspart U-100 pen 1-10 Units Q4H PRN    labetalol 20 mg/4 mL (5 mg/mL) IV syring Q6H PRN    levothyroxine injection 40 mcg Daily    magnesium sulfate 2g in water 50mL IVPB (premix) PRN    magnesium sulfate 2g in water 50mL IVPB (premix) PRN    melatonin tablet 6 mg Nightly PRN    nitroGLYCERIN 2% TD oint ointment 0.5 inch Q6H    olanzapine zydis disintegrating tablet 10 mg QHS    ondansetron injection 4 mg Q8H PRN    pantoprazole injection 40 mg Daily    piperacillin-tazobactam 4.5 g in sodium chloride 0.9% 100 mL IVPB (ready to mix system) Q8H    potassium chloride 40 mEq in 100 mL IVPB (FOR CENTRAL LINE ADMINISTRATION ONLY) PRN    And    potassium chloride 20 mEq in 100 mL IVPB (FOR CENTRAL LINE ADMINISTRATION ONLY) PRN    And    potassium chloride 40 mEq in 100 mL IVPB (FOR CENTRAL LINE ADMINISTRATION ONLY) PRN    promethazine tablet 25 mg Q6H PRN    sodium chloride 0.9% flush 10 mL PRN    sodium chloride 0.9% flush 10 mL PRN    sodium phosphate 15 mmol in dextrose 5 % 250 mL IVPB PRN    sodium phosphate 20.01 mmol in dextrose 5 % 250 mL IVPB PRN    sodium phosphate 30 mmol in dextrose 5 % 250 mL  IVPB PRN    tacrolimus capsule 0.5 mg Daily AM    TPN ADULT CENTRAL LINE CUSTOM Continuous       Objective:     Vital Signs (Most Recent):  Temp: 98 °F (36.7 °C) (09/14/20 1500)  Pulse: 108 (09/14/20 1530)  Resp: 19 (09/14/20 1530)  BP: 119/60 (09/14/20 1530)  SpO2: 97 % (09/14/20 1530)  O2 Device (Oxygen Therapy): nasal cannula (09/14/20 1500) Vital Signs (24h Range):  Temp:  [98 °F (36.7 °C)-99 °F (37.2 °C)] 98 °F (36.7 °C)  Pulse:  [100-142] 108  Resp:  [17-46] 19  SpO2:  [76 %-100 %] 97 %  BP: (102-151)/() 119/60     Weight: 68 kg (149 lb 14.6 oz) (09/13/20 0500)  Body mass index is 30.28 kg/m².  Body surface area is 1.68 meters squared.    I/O last 3 completed shifts:  In: 3225 [I.V.:2475; NG/GT:750]  Out: 1345 [Urine:425; Drains:220; Stool:700]    Physical Exam  Vitals signs and nursing note reviewed.   Constitutional:       General: She is not in acute distress.  HENT:      Head: Normocephalic and atraumatic.      Nose: Nose normal.      Mouth/Throat:      Mouth: Mucous membranes are moist.   Eyes:      Extraocular Movements: Extraocular movements intact.   Neck:      Musculoskeletal: Normal range of motion.      Comments: RIJ central line  Cardiovascular:      Rate and Rhythm: Regular rhythm. Tachycardia present.      Heart sounds: Normal heart sounds.   Pulmonary:      Breath sounds: Normal breath sounds. No wheezing.   Abdominal:      Comments: Midline incision with dressing in place; dressing dry, intact with old blood stain; G tube in place   Musculoskeletal: Normal range of motion.   Skin:     General: Skin is warm.      Comments: L peringuinal hematoma.  LLE pulses 2+.   Neurological:      General: No focal deficit present.      Mental Status: She is alert.      Comments: AAOx2         Significant Labs:  CBC:   Recent Labs   Lab 09/14/20  0339   WBC 19.96*   RBC 2.79*   HGB 8.1*   HCT 26.2*      MCV 94   MCH 29.0   MCHC 30.9*     CMP:   Recent Labs   Lab 09/14/20  0339   *  125*    CALCIUM 7.8*  7.8*   ALBUMIN 1.7*  1.7*   PROT 5.1*  5.1*   *  146*   K 3.6  3.6   CO2 24  24     110   BUN 56*  56*   CREATININE 1.5*  1.5*   ALKPHOS 255*  255*   ALT 73*  73*   AST 83*  83*   BILITOT 2.8*  2.8*     Coagulation:   Recent Labs   Lab 09/14/20  0339   INR 1.4*   APTT 46.1*     All labs within the past 24 hours have been reviewed.     Significant Imaging:  Labs: Reviewed    Assessment/Plan:     JACQUE (acute kidney injury)  JACQUE most likely secondary to hypoperfusion, likely ATN.  Pt also with non anion gap metabolic acidosis with respiratory compensation  Given Lasix 150mg IV x1 to poor UOP on 09/07    - No emergent need for dialysis today  -  cc/24 hrs, decreased from day before   - on Dextrose 5% 50cc/hr as tolerated   - lasix discontinued, last dose at 2 pm on 09/09  - Cr downtrend: 3.0-->3.2-->2.2-->1.7-->1.6-->1.5  - not on Free water boluses  - Strict I/O and chart   - Renally dose all medications   - Avoid nephrotoxic medications, NSAIDs, IV contrast, ACE/ARB.  - Maintain MAP > 65  - Hb > 7 gm/dL        Hypernatremia  Improving  On dextrose infusion at 50 ml/hr  Closely monitor BMP for resolution    Hypokalemia  3.6 on latest BMP  Replenish aggresively    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  Management as per Hepatology   On Prograf, monitor daily levels         Thank you for your consult. I will follow-up with patient. Please contact us if you have any additional questions.    Al Shea MD  Nephrology  [facility]

## 2020-09-14 NOTE — CONSULTS
Consult received per RN for skin tears to left leg. Pt is known to wound care team from a previous consult for left heel, back and sides of torso.    Received pt lying in bed awake and alert with soft restraints in use. Heel foams noted to bilateral heels.     Upon assessment of left thigh: Open area of partial skin thickness to left thigh that appears to be from an unroofed blister measuring 0.5 cm x 0.5 cm x 0.1 cm. Moist red and pink granulation tissue with a small amount of serous drainage noted. Border foam reinforced.    To right wrist: Open area of partial skin thickness to right wrist measuring 1.5 cm x 1 cm x 0.1 cm with moist pink and red tissue with a small amount of serous drainage noted. Cleansed wound bed with sterile normal saline with a border foam to cover.    (see photos and measurements below)    Also of note, no open areas noted to bilateral heels at this time. Border foam reinforced. Heel lift boots to be ordered per wound care nurse as a pressure prevention intervention.    Recommendations:  -Nursing to cleanse wounds to left thigh and right wrist with sterile normal saline with a border foam to cover. Dressings to be changed 1 x a week and PRN if soiled or dislodged. Nursing to continue skin tear protocls in place.  -Nursing to continue pressure prevention interventions as directed. Pressure injury prevention interventions to include assisting pt with turning/ repositioning with wedge pillow, heel protectors, heel lift boots or pillow to float heels with pillow to help alleviate pressure while pt is lying in bed, and waffle cushion overlay.    Plan of care discussed with pt. Nursing to continue care.Wound care to sign off at this time. Please reconsult if further assistance is needed. [id number]          Open area of partial skin thickness to left thigh that appears to be from an unroofed blister measuring 0.5 cm x 0.5 cm x 0.1 cm    Open area of partial skin thickness to right wrist measuring 1.5  cm x 1 cm x 0.1 cm

## 2020-09-14 NOTE — PLAN OF CARE
Problem: Physical Therapy Goal  Goal: Physical Therapy Goal  Description: Goals to be met by: 20     Patient will increase functional independence with mobility by performin. Supine to sit with MInimal Assistance  2. Sit to supine with MInimal Assistance  3. Sit to stand transfer with Minimal Assistance  4. Bed to chair transfer with Minimal Assistance using Rolling Walker or no AD.   5. Gait  x 25 feet with Minimal Assistance using Rolling Walker or no AD.    6. Lower extremity exercise program x30 reps per handout, with independence    Outcome: Ongoing, Progressing       PT eval complete. Patient is not safe to return home when medically ready at current level of mobility and would benefit from skilled nursing facility to improve functional mobility and safety.        Georgia Jaimes, PT, DPT  2020

## 2020-09-14 NOTE — PLAN OF CARE
Problem: Occupational Therapy Goal  Goal: Occupational Therapy Goal  Description: Goals to be met by: 9/25/20     Patient will increase functional independence with ADLs by performing:    Feeding with Set-up Assistance.  UE Dressing with Stand-by Assistance.  LE Dressing with Stand-by Assistance.  Grooming while standing at sink with Stand-by Assistance.  Toileting from toilet with Stand-by Assistance for hygiene and clothing management.   Toilet transfer to toilet with Stand-by Assistance.    Outcome: Ongoing, Progressing   OT eval completed, and above goals established. PAULINE Geronimo  9/14/2020

## 2020-09-14 NOTE — PROGRESS NOTES
[facility]  Critical Care - Surgery  Progress Note    Patient Name: Elda Hernandez  MRN: [id number]  Admission Date: 9/3/2020  Hospital Length of Stay: 11 days  Code Status: Full Code  Attending Provider: Clark Rodriguez MD  Primary Care Provider: Jordana Woods MD   Principal Problem: Perforated abdominal viscus    Subjective:     Hospital/ICU Course:  9/4: Pt admitted to SICU following ex lap for GI perf. Intubated, sedated. Wound vac in place.  Soon after arrival to unit a mottled appearance to RUE was noted.  Vascular consulted and US revealed R radial artery thrombosis.    9/5: Tachycardic..  R arm appearance greatly improved overnight.    9/6: Patient returned to OR for abdominal washout, closure. R arm with dopplerable signals to ulnar artery and palmar arch.   9/7: Attempted to wean patient off of sedation in an effort to move towards extubation. Patient weaned off of propofol. Precedex started. Patient's heart rate very labile and sensitize to sedation.   9/8: patient extubated  9/9: SHANICE. Labetalol PRN for HTN   9/10:  Diuresis increased.  Cr downtrending  9/11: Increased abdominal distension and elevated WBC count. CT abd  9/12: WBC to 45 today. Diflucan and zosyn initiated. Lactulose enema w/ improving mental status  9/13: WBC improved. Mental status improving. FWF initiated for hypernatremia.   9/14: White count continues to improve.  Oriented to person only this morning.      Interval History/Significant Events: NAEO.  Pt pleasant this morning.  Oriented to person only.  Denies pain currently.  Denies any new complaints.    Follow-up For: Procedure(s) (LRB):  LAPAROTOMY, EXPLORATORY with abd closure (N/A)    Post-Operative Day: 8 Days Post-Op    Objective:     Vital Signs (Most Recent):  Temp: 99 °F (37.2 °C) (09/14/20 0300)  Pulse: 108 (09/14/20 0705)  Resp: (!) 21 (09/14/20 0705)  BP: (!) 144/58 (09/14/20 0600)  SpO2: 97 % (09/14/20 0705) Vital Signs (24h Range):  Temp:  [98.4  °F (36.9 °C)-99 °F (37.2 °C)] 99 °F (37.2 °C)  Pulse:  [101-142] 108  Resp:  [17-54] 21  SpO2:  [76 %-100 %] 97 %  BP: ()/(53-94) 144/58     Weight: 68 kg (149 lb 14.6 oz)  Body mass index is 30.28 kg/m².      Intake/Output Summary (Last 24 hours) at 9/14/2020 0731  Last data filed at 9/14/2020 0600  Gross per 24 hour   Intake 2520 ml   Output 1275 ml   Net 1245 ml       Physical Exam  Vitals signs and nursing note reviewed.   Constitutional:       General: She is not in acute distress.  HENT:      Head: Normocephalic and atraumatic.      Nose: Nose normal.      Mouth/Throat:      Mouth: Mucous membranes are moist.   Eyes:      Extraocular Movements: Extraocular movements intact.   Neck:      Musculoskeletal: Normal range of motion.      Comments: RIJ central line  Cardiovascular:      Rate and Rhythm: Normal rate and regular rhythm.      Heart sounds: Normal heart sounds.   Pulmonary:      Breath sounds: Normal breath sounds. No wheezing.   Abdominal:      Comments: Midline incision with dressing in place; dressing dry, intact with old blood stain; G tube to gravity with moderate output   Musculoskeletal: Normal range of motion.   Skin:     General: Skin is warm and dry.      Coloration: Skin is not jaundiced or pale.      Comments: L peringuinal hematoma.  LLE pulses 2+.   Neurological:      General: No focal deficit present.      Mental Status: She is alert.      Comments: AAOx2         Vents:  Vent Mode: Spont/T (09/08/20 1607)  Ventilator Initiated: Yes (09/04/20 1130)  Set Rate: 14 BPM (09/08/20 1142)  Vt Set: 330 mL (09/08/20 1142)  Pressure Support: 5 cmH20 (09/08/20 1607)  PEEP/CPAP: 5 cmH20 (09/08/20 1607)  Oxygen Concentration (%): 40 (09/08/20 1142)  Peak Airway Pressure: 21 cmH2O (09/08/20 1142)  Plateau Pressure: 14 cmH20 (09/08/20 0850)  Total Ve: 4.54 mL (09/08/20 1142)  Negative Inspiratory Force (cm H2O): -35 (09/08/20 1607)  F/VT Ratio<105 (RSBI): (!) 43.21 (09/08/20  1142)    Lines/Drains/Airways     Central Venous Catheter Line            Percutaneous Central Line Insertion/Assessment - Triple Lumen  09/04/20 0800 right internal jugular 9 days          Drain                 Gastrostomy/Enterostomy 09/04/20 LUQ decompression 10 days         Fecal Incontinence  09/12/20 0015 2 days                Significant Labs:    CBC/Anemia Profile:  Recent Labs   Lab 09/13/20 0303 09/13/20 2053 09/14/20  0339   WBC 31.70* 24.38* 19.96*   HGB 8.4* 8.0* 8.1*   HCT 26.9* 26.9* 26.2*    212 190   MCV 93 95 94   RDW 17.5* 17.5* 17.2*        Chemistries:  Recent Labs   Lab 09/13/20 0303 09/13/20 2053 09/14/20 0339   * 148* 146*  146*   K 3.7 3.7 3.6  3.6    111* 110  110   CO2 25 24 24  24   BUN 69* 59* 56*  56*   CREATININE 1.6* 1.6* 1.5*  1.5*   CALCIUM 7.8* 7.8* 7.8*  7.8*   ALBUMIN 1.7* 1.7* 1.7*  1.7*   PROT 4.8* 5.0* 5.1*  5.1*   BILITOT 2.5* 2.9* 2.8*  2.8*   ALKPHOS 209* 216* 255*  255*   ALT 77* 72* 73*  73*   AST 88* 80* 83*  83*   MG 2.4 2.2 2.2   PHOS 4.0 3.4 3.7       All pertinent labs within the past 24 hours have been reviewed.    Significant Imaging:  I have reviewed all pertinent imaging results/findings within the past 24 hours.    Assessment/Plan:     * Perforated abdominal viscus  Neuro:  -sedation: none  -analgesia: scheduled tylenol; holding narcs in setting of AMS  - Olanzapine 10mg SL QHS     CV:   - heparin gtt discontinued 9/9.   - HDS off of pressors    Pulm:  - Satting well on 2L NC this AM  - Continue to wean as tolerated  - Duonebs Q6     FEN/GI: Perforated stomach; s/p washout and patch (9/4); abdominal closure (9/6)  - s/p washout and closure on 9/6   - Tube feeds held; G-tube to gravity  - Starting TPN 9/14  - G tube with mucoid drainage around insertion site following FWF.  Will discuss CT for further evaluation today.  FWF discontinued  - Discontinue lactulose for now in setting of normal ammonia level and improving  mental status  - Daily metabolic panel with PRN repletion of electrolytes  - pantoprazole for ulcer ppx  - D5W @ 50/hr     Renal: Pt with JACQUE upon arrival; BUN/Cr: 32/2.1 (9/4)  - JACQUE improving  - Continue to monitor with daily metabolic labs  - nephrology on board, appreciating recs  - DC Bhardwaj  - BMP this afternoon     HEME/ID:   - Hb stable, WBC downtrending  - Daily CBC  - s/p Liver transplant in 2002; Daily tacrolimus level with AM labs. Per hepatology, restart tacrolimus 1mg BID  - Consult ID  - Diflucan and Zosyn  - Lovenox for DVT ppx     Endo:  - Hx of hypothyroidism; not taking synthroid at home  - Synthroid 40mcg IV daily    PPx:  - SQh 5kU Q8H  - pantoprazole 40mg IV daily    Dispo:  - Continue SICU care        Critical care was time spent personally by me on the following activities: development of treatment plan with patient or surrogate and bedside caregivers, discussions with consultants, evaluation of patient's response to treatment, examination of patient, ordering and performing treatments and interventions, ordering and review of laboratory studies, ordering and review of radiographic studies, pulse oximetry, re-evaluation of patient's condition.  This critical care time did not overlap with that of any other provider or involve time for any procedures.     John Spivey MD  Critical Care - Surgery  [facility]

## 2020-09-14 NOTE — ASSESSMENT & PLAN NOTE
50 y.o. female w/ free air on CT scan. S/p emergent ex lap on 9/4 w/ findings of gastric perforation, primary repair. Now 8 Days Post-Op from abdominal closure on 9/6, skin stapled closed, KERRY drains removed.     Continue SICU care  Leave G tube to gravity tonight.   Zosyn and diflucan for suspected intraabodminal source.  May need IR drain into peritoneal fluid collection  Continue delirium precautions, leigh seroquel  G tube study to confirm position

## 2020-09-14 NOTE — TREATMENT PLAN
Hepatology Treatment Plan    Elda Hernandez is a 50 y.o. female admitted to hospital 9/3/2020 (Hospital Day: 12) due to Perforated abdominal viscus. Hepatology following for immunosuppression management.    Lab Results   Component Value Date    TACROLIMUS 7.0 09/14/2020    TACROLIMUS 7.8 09/13/2020    TACROLIMUS 11.6 09/13/2020       Lab Results   Component Value Date    CREATININE 1.5 (H) 09/14/2020    CREATININE 1.5 (H) 09/14/2020    CREATININE 1.6 (H) 09/13/2020       Lab Results   Component Value Date    ALT 73 (H) 09/14/2020    ALT 73 (H) 09/14/2020    AST 83 (H) 09/14/2020    AST 83 (H) 09/14/2020     (H) 04/07/2018    ALKPHOS 255 (H) 09/14/2020    ALKPHOS 255 (H) 09/14/2020    BILITOT 2.8 (H) 09/14/2020    BILITOT 2.8 (H) 09/14/2020    WBC 19.96 (H) 09/14/2020    HGB 8.1 (L) 09/14/2020     09/14/2020       Kidney function is improving  Liver enzymes are improving    Plan  - Continue immunosuppression regimen without changes    Immunosuppression Regimen:  Tacrolimus: 0.5mg daily sublingual    Please notify hepatology on day of discharge to discuss discharge medications and follow up.     Please obtain daily CBC, BMP, LFT, INR, immunosuppressant trough level    Thank you for involving us in the care of Elda Hernandez. Please call with any additional concerns or questions.    John Mustafa MD  Gastroenterology Fellow

## 2020-09-14 NOTE — PT/OT/SLP EVAL
"Occupational Therapy   Evaluation    Name: Elda Hernandez  MRN: [id number]  Admitting Diagnosis:  Perforated abdominal viscus 8 Days Post-Op s/p exp-lap 9/4 for repair and multiple returns to OR for washouts    Recommendations:     Discharge Recommendations: nursing facility, skilled  Discharge Equipment Recommendations:  (TBD)  Barriers to discharge:  (Unknown)    Assessment:     Elda Hernandez is a 50 y.o. female with a medical diagnosis of Perforated abdominal viscus.  She presents with AMS, agitation and mildly aggressive bx during session. Performance deficits affecting function: weakness, impaired functional mobilty, gait instability, impaired endurance, decreased safety awareness, impaired cardiopulmonary response to activity, impaired cognition, impaired balance, impaired self care skills.      Rehab Prognosis: Good; patient would benefit from acute skilled OT services to address these deficits and reach maximum level of function.       Plan:     Patient to be seen 3 x/week to address the above listed problems via self-care/home management, therapeutic activities, therapeutic exercises  · Plan of Care Expires: 10/14/20  · Plan of Care Reviewed with: patient    Subjective     Chief Complaint: "I am  to Ellis Tirado."  Patient/Family Comments/goals: AJAY    Occupational Profile:  Unable to obtain 2* AMS  Per CM note, pt lives with  and was (I) PTA    Pain/Comfort:  · Pain Rating 1: (Did not report any pain)    Patients cultural, spiritual, Yarsani conflicts given the current situation: no    Objective:     Communicated with: RN prior to session.  Patient found supine with blood pressure cuff, pulse ox (continuous), telemetry, restraints, central line(G-tube, HFNC) upon OT entry to room.    General Precautions: Standard, fall   Orthopedic Precautions:    Braces:       Occupational Performance:    Bed Mobility:    · Patient completed Supine to Sit with total assistance  · Patient completed Sit " "to Supine with total assistance    Functional Mobility/Transfers:  · Deferred 2* pt resisting sitting EOB  · Functional Mobility: NT    Activities of Daily Living:  · Total A for all related to AMS    Cognitive/Visual Perceptual:  Pt is alert, confused and disoriented x 4  Pt inconsistently follows basic commands    Physical Exam:  Pt uncooperative with exam, but  BUE ROM and MMT WFL noted through observation    [facility] 6 Click ADL:  [facility] Total Score: 6    Treatment & Education:  Pt ed on OT POC  Daily orientation provided  Pt sat EOB with total A 2* pushing back and "swatting" therapists out of the way  Education:    Patient left supine with all lines intact, call button in reach, restraints reapplied at end of session and RN notified    GOALS:   Multidisciplinary Problems     Occupational Therapy Goals        Problem: Occupational Therapy Goal    Goal Priority Disciplines Outcome Interventions   Occupational Therapy Goal     OT, PT/OT Ongoing, Progressing    Description: Goals to be met by: 9/25/20     Patient will increase functional independence with ADLs by performing:    Feeding with Set-up Assistance.  UE Dressing with Stand-by Assistance.  LE Dressing with Stand-by Assistance.  Grooming while standing at sink with Stand-by Assistance.  Toileting from toilet with Stand-by Assistance for hygiene and clothing management.   Toilet transfer to toilet with Stand-by Assistance.                     History:     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    Arnold-Chiari malformation     Depression     Esophageal stricture     Essential tremor     Hypertension     Left bundle branch block     Liver fibrosis, transplanted liver 10/2/2018    Suggested on fibroscan 10/2/18    Migraine without aura     MVP (mitral valve prolapse)     Non-rheumatic mitral regurgitation 10/1/2018    Non-rheumatic tricuspid valve insufficiency 10/1/2018    Osteoporosis     Recurrent urinary tract " infection     Seizures     Shingles 2007    SIADH (syndrome of inappropriate ADH production)     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    Tricuspid valve prolapse     Urolithiasis     Von Gierke disease     s/p liver transplant       Past Surgical History:   Procedure Laterality Date    APPENDECTOMY  6/22/2007    COLONOSCOPY  5/13/2008    internal hemorrhoids    CRANIOTOMY      ESOPHAGOGASTRODUODENOSCOPY N/A 9/3/2020    Procedure: EGD (ESOPHAGOGASTRODUODENOSCOPY);  Surgeon: Tyrel Vergara MD;  Location: [facility];  Service: Endoscopy;  Laterality: N/A;    LAMINECTOMY  3/2001    LIVER TRANSPLANT  9/23/2002    OSSICULAR RECONSTRUCTION  10/4/1995    RIGHT REPLACEMENT PROSTHESIS for cholesteatoma    THYMECTOMY  5/2/2007    TONSILLECTOMY, ADENOIDECTOMY  1/21/2004    TOTAL ABDOMINAL HYSTERECTOMY  3/31/1994       Time Tracking:     OT Date of Treatment: 09/14/20  OT Start Time: 1005  OT Stop Time: 1015  OT Total Time (min): 10 min    Billable Minutes:Evaluation 10    PAULINE Geronimo  9/14/2020

## 2020-09-14 NOTE — PLAN OF CARE
"      SICU PLAN OF CARE NOTE    Dx: Perforated abdominal viscus    Shift Events: Patient and patient's  updated on plan of care. No acute events during shift. Bhardwaj catheter placed. G tube study done at bedside. All questions and concerns acknowledged and answered. See flow sheets for full assessment details. Will continue to monitor patient closely.     Goals of Care: comfort; PT/OT;     Neuro: Confused; oriented to self    Vital Signs: BP (!) 130/53   Pulse 105   Temp 98 °F (36.7 °C) (Oral)   Resp (!) 22   Ht 4' 11" (1.499 m)   Wt 68 kg (149 lb 14.6 oz)   SpO2 96%   BMI 30.28 kg/m²     Respiratory: Nasal Cannula 2L    Diet: NPO    Gtts: MIVF 50ml/hr    Urine Output: Urinary Catheter 50-60 cc/hour    Drains: G-tube/J-tube, total output 75 cc /  shift           "

## 2020-09-14 NOTE — SUBJECTIVE & OBJECTIVE
Interval History:   Calm this AM  G tube to gravity, reportedly not flushing after adjustment yesterday.    Medications:  Continuous Infusions:   dextrose 5 % 50 mL/hr at 09/14/20 1200    TPN ADULT CENTRAL LINE CUSTOM       Scheduled Meds:   acetaminophen  650 mg Rectal Q6H    albuterol-ipratropium  3 mL Nebulization Q4H    diatrizoate meglumineand-diatrizoate sodium  25 mL Oral Once    fat emulsion 20%  250 mL Intravenous Daily    fluconazole (DIFLUCAN) IVPB  200 mg Intravenous Daily    heparin (porcine)  5,000 Units Subcutaneous Q8H    levothyroxine  40 mcg Intravenous Daily    nitroGLYCERIN 2% TD oint  0.5 inch Topical (Top) Q6H    olanzapine zydis  10 mg Oral QHS    pantoprazole  40 mg Intravenous Daily    piperacillin-tazobactam (ZOSYN) IVPB  4.5 g Intravenous Q8H    tacrolimus  0.5 mg Sublingual Daily AM    tuberculin  5 Units Intradermal Once     PRN Meds:calcium gluconate IVPB, calcium gluconate IVPB, calcium gluconate IVPB, dextrose 50%, dextrose 50%, glucagon (human recombinant), glucose, glucose, hydrALAZINE, insulin aspart U-100, labetaloL, magnesium sulfate IVPB, magnesium sulfate IVPB, melatonin, ondansetron, potassium chloride in water **AND** potassium chloride in water **AND** potassium chloride in water, promethazine, sodium chloride 0.9%, sodium chloride 0.9%, sodium phosphate IVPB, sodium phosphate IVPB, sodium phosphate IVPB     Review of patient's allergies indicates:   Allergen Reactions    Codeine Itching     Other reaction(s): Itching    Lipitor [atorvastatin] Other (See Comments)     Other reaction(s): Muscle pain  Muscle cranmps    Morphine Itching     Other reaction(s): nausea and vomiting     Zoloft [sertraline] Other (See Comments)     Tremors/muscle spasms     Objective:     Vital Signs (Most Recent):  Temp: 99 °F (37.2 °C) (09/14/20 0300)  Pulse: 104 (09/14/20 1315)  Resp: (!) 21 (09/14/20 1315)  BP: 116/61 (09/14/20 1300)  SpO2: 97 % (09/14/20 1315) Vital Signs  (24h Range):  Temp:  [98.7 °F (37.1 °C)-99 °F (37.2 °C)] 99 °F (37.2 °C)  Pulse:  [100-142] 104  Resp:  [17-46] 21  SpO2:  [76 %-100 %] 97 %  BP: (102-151)/() 116/61     Weight: 68 kg (149 lb 14.6 oz)  Body mass index is 30.28 kg/m².    Intake/Output - Last 3 Shifts       09/12 0700 - 09/13 0659 09/13 0700 - 09/14 0659 09/14 0700 - 09/15 0659    P.O.       I.V. (mL/kg) 1744 (25.6) 1770 (26)     NG/GT  750     Total Intake(mL/kg) 1744 (25.6) 2520 (37.1)     Urine (mL/kg/hr) 290 (0.2) 425 (0.3)     Drains 180 160     Stool 400 700     Total Output 870 1285     Net +874 +1235            Urine Occurrence 2 x 1 x 1 x    Stool Occurrence 3 x            Physical Exam  Constitutional:       General: She is not in acute distress.  Cardiovascular:      Rate and Rhythm: Regular rhythm. Tachycardia present.   Pulmonary:      Effort: Pulmonary effort is normal. No respiratory distress.   Abdominal:      General: There is no distension.      Palpations: Abdomen is soft.      Tenderness: There is no abdominal tenderness.      Comments: Incision c/d/i  Thick drainage around G tube   Neurological:      General: No focal deficit present.      Mental Status: She is alert and oriented to person, place, and time.   Psychiatric:         Mood and Affect: Mood normal.         Behavior: Behavior normal.         Significant Labs:  CBC:   Recent Labs   Lab 09/14/20 0339   WBC 19.96*   RBC 2.79*   HGB 8.1*   HCT 26.2*      MCV 94   MCH 29.0   MCHC 30.9*     CMP:   Recent Labs   Lab 09/14/20 0339   *  125*   CALCIUM 7.8*  7.8*   ALBUMIN 1.7*  1.7*   PROT 5.1*  5.1*   *  146*   K 3.6  3.6   CO2 24  24     110   BUN 56*  56*   CREATININE 1.5*  1.5*   ALKPHOS 255*  255*   ALT 73*  73*   AST 83*  83*   BILITOT 2.8*  2.8*       Significant Diagnostics:  I have reviewed all pertinent imaging results/findings within the past 24 hours.

## 2020-09-14 NOTE — SUBJECTIVE & OBJECTIVE
Interval History/Significant Events: NAEO.  Pt pleasant this morning.  Oriented to person only.  Denies pain currently.  Denies any new complaints.    Follow-up For: Procedure(s) (LRB):  LAPAROTOMY, EXPLORATORY with abd closure (N/A)    Post-Operative Day: 8 Days Post-Op    Objective:     Vital Signs (Most Recent):  Temp: 99 °F (37.2 °C) (09/14/20 0300)  Pulse: 108 (09/14/20 0705)  Resp: (!) 21 (09/14/20 0705)  BP: (!) 144/58 (09/14/20 0600)  SpO2: 97 % (09/14/20 0705) Vital Signs (24h Range):  Temp:  [98.4 °F (36.9 °C)-99 °F (37.2 °C)] 99 °F (37.2 °C)  Pulse:  [101-142] 108  Resp:  [17-54] 21  SpO2:  [76 %-100 %] 97 %  BP: ()/(53-94) 144/58     Weight: 68 kg (149 lb 14.6 oz)  Body mass index is 30.28 kg/m².      Intake/Output Summary (Last 24 hours) at 9/14/2020 0731  Last data filed at 9/14/2020 0600  Gross per 24 hour   Intake 2520 ml   Output 1275 ml   Net 1245 ml       Physical Exam  Vitals signs and nursing note reviewed.   Constitutional:       General: She is not in acute distress.  HENT:      Head: Normocephalic and atraumatic.      Nose: Nose normal.      Mouth/Throat:      Mouth: Mucous membranes are moist.   Eyes:      Extraocular Movements: Extraocular movements intact.   Neck:      Musculoskeletal: Normal range of motion.      Comments: RIJ central line  Cardiovascular:      Rate and Rhythm: Normal rate and regular rhythm.      Heart sounds: Normal heart sounds.   Pulmonary:      Breath sounds: Normal breath sounds. No wheezing.   Abdominal:      Comments: Midline incision with dressing in place; dressing dry, intact with old blood stain; G tube to gravity with moderate output   Musculoskeletal: Normal range of motion.   Skin:     General: Skin is warm and dry.      Coloration: Skin is not jaundiced or pale.      Comments: L peringuinal hematoma.  LLE pulses 2+.   Neurological:      General: No focal deficit present.      Mental Status: She is alert.      Comments: AAOx2         Vents:  Vent  Mode: Spont/T (09/08/20 1607)  Ventilator Initiated: Yes (09/04/20 1130)  Set Rate: 14 BPM (09/08/20 1142)  Vt Set: 330 mL (09/08/20 1142)  Pressure Support: 5 cmH20 (09/08/20 1607)  PEEP/CPAP: 5 cmH20 (09/08/20 1607)  Oxygen Concentration (%): 40 (09/08/20 1142)  Peak Airway Pressure: 21 cmH2O (09/08/20 1142)  Plateau Pressure: 14 cmH20 (09/08/20 0850)  Total Ve: 4.54 mL (09/08/20 1142)  Negative Inspiratory Force (cm H2O): -35 (09/08/20 1607)  F/VT Ratio<105 (RSBI): (!) 43.21 (09/08/20 1142)    Lines/Drains/Airways     Central Venous Catheter Line            Percutaneous Central Line Insertion/Assessment - Triple Lumen  09/04/20 0800 right internal jugular 9 days          Drain                 Gastrostomy/Enterostomy 09/04/20 LUQ decompression 10 days         Fecal Incontinence  09/12/20 0015 2 days                Significant Labs:    CBC/Anemia Profile:  Recent Labs   Lab 09/13/20 0303 09/13/20 2053 09/14/20  0339   WBC 31.70* 24.38* 19.96*   HGB 8.4* 8.0* 8.1*   HCT 26.9* 26.9* 26.2*    212 190   MCV 93 95 94   RDW 17.5* 17.5* 17.2*        Chemistries:  Recent Labs   Lab 09/13/20 0303 09/13/20 2053 09/14/20  0339   * 148* 146*  146*   K 3.7 3.7 3.6  3.6    111* 110  110   CO2 25 24 24  24   BUN 69* 59* 56*  56*   CREATININE 1.6* 1.6* 1.5*  1.5*   CALCIUM 7.8* 7.8* 7.8*  7.8*   ALBUMIN 1.7* 1.7* 1.7*  1.7*   PROT 4.8* 5.0* 5.1*  5.1*   BILITOT 2.5* 2.9* 2.8*  2.8*   ALKPHOS 209* 216* 255*  255*   ALT 77* 72* 73*  73*   AST 88* 80* 83*  83*   MG 2.4 2.2 2.2   PHOS 4.0 3.4 3.7       All pertinent labs within the past 24 hours have been reviewed.    Significant Imaging:  I have reviewed all pertinent imaging results/findings within the past 24 hours.

## 2020-09-14 NOTE — SUBJECTIVE & OBJECTIVE
Interval History: Patient seen and examined at bedside, on oxygen therapy. Hypernatremia improving.     Review of patient's allergies indicates:   Allergen Reactions    Codeine Itching     Other reaction(s): Itching    Lipitor [atorvastatin] Other (See Comments)     Other reaction(s): Muscle pain  Muscle cranmps    Morphine Itching     Other reaction(s): nausea and vomiting     Zoloft [sertraline] Other (See Comments)     Tremors/muscle spasms     Current Facility-Administered Medications   Medication Frequency    acetaminophen suppository 650 mg Q6H    albuterol-ipratropium 2.5 mg-0.5 mg/3 mL nebulizer solution 3 mL Q4H    calcium gluconate 1g in dextrose 5% 100mL (ready to mix system) PRN    calcium gluconate 2 g in dextrose 5 % 100 mL IVPB PRN    calcium gluconate 3 g in dextrose 5 % 100 mL IVPB PRN    dextrose 5 % infusion Continuous    dextrose 50% injection 12.5 g PRN    dextrose 50% injection 25 g PRN    diatrizoate meglumineand-diatrizoate sodium (GASTROGRAFFIN) solution 25 mL Once    fat emulsion 20% infusion 250 mL Daily    fluconazole (DIFLUCAN) IVPB 200 mg 100 mL Daily    glucagon (human recombinant) injection 1 mg PRN    glucose chewable tablet 16 g PRN    glucose chewable tablet 24 g PRN    heparin (porcine) injection 5,000 Units Q8H    hydrALAZINE injection 10 mg Q8H PRN    insulin aspart U-100 pen 1-10 Units Q4H PRN    labetalol 20 mg/4 mL (5 mg/mL) IV syring Q6H PRN    levothyroxine injection 40 mcg Daily    magnesium sulfate 2g in water 50mL IVPB (premix) PRN    magnesium sulfate 2g in water 50mL IVPB (premix) PRN    melatonin tablet 6 mg Nightly PRN    nitroGLYCERIN 2% TD oint ointment 0.5 inch Q6H    olanzapine zydis disintegrating tablet 10 mg QHS    ondansetron injection 4 mg Q8H PRN    pantoprazole injection 40 mg Daily    piperacillin-tazobactam 4.5 g in sodium chloride 0.9% 100 mL IVPB (ready to mix system) Q8H    potassium chloride 40 mEq in 100 mL IVPB (FOR  CENTRAL LINE ADMINISTRATION ONLY) PRN    And    potassium chloride 20 mEq in 100 mL IVPB (FOR CENTRAL LINE ADMINISTRATION ONLY) PRN    And    potassium chloride 40 mEq in 100 mL IVPB (FOR CENTRAL LINE ADMINISTRATION ONLY) PRN    promethazine tablet 25 mg Q6H PRN    sodium chloride 0.9% flush 10 mL PRN    sodium chloride 0.9% flush 10 mL PRN    sodium phosphate 15 mmol in dextrose 5 % 250 mL IVPB PRN    sodium phosphate 20.01 mmol in dextrose 5 % 250 mL IVPB PRN    sodium phosphate 30 mmol in dextrose 5 % 250 mL IVPB PRN    tacrolimus capsule 0.5 mg Daily AM    TPN ADULT CENTRAL LINE CUSTOM Continuous       Objective:     Vital Signs (Most Recent):  Temp: 98 °F (36.7 °C) (09/14/20 1500)  Pulse: 108 (09/14/20 1530)  Resp: 19 (09/14/20 1530)  BP: 119/60 (09/14/20 1530)  SpO2: 97 % (09/14/20 1530)  O2 Device (Oxygen Therapy): nasal cannula (09/14/20 1500) Vital Signs (24h Range):  Temp:  [98 °F (36.7 °C)-99 °F (37.2 °C)] 98 °F (36.7 °C)  Pulse:  [100-142] 108  Resp:  [17-46] 19  SpO2:  [76 %-100 %] 97 %  BP: (102-151)/() 119/60     Weight: 68 kg (149 lb 14.6 oz) (09/13/20 0500)  Body mass index is 30.28 kg/m².  Body surface area is 1.68 meters squared.    I/O last 3 completed shifts:  In: 3225 [I.V.:2475; NG/GT:750]  Out: 1345 [Urine:425; Drains:220; Stool:700]    Physical Exam  Vitals signs and nursing note reviewed.   Constitutional:       General: She is not in acute distress.  HENT:      Head: Normocephalic and atraumatic.      Nose: Nose normal.      Mouth/Throat:      Mouth: Mucous membranes are moist.   Eyes:      Extraocular Movements: Extraocular movements intact.   Neck:      Musculoskeletal: Normal range of motion.      Comments: RIJ central line  Cardiovascular:      Rate and Rhythm: Regular rhythm. Tachycardia present.      Heart sounds: Normal heart sounds.   Pulmonary:      Breath sounds: Normal breath sounds. No wheezing.   Abdominal:      Comments: Midline incision with dressing in  place; dressing dry, intact with old blood stain; G tube in place   Musculoskeletal: Normal range of motion.   Skin:     General: Skin is warm.      Comments: L peringuinal hematoma.  LLE pulses 2+.   Neurological:      General: No focal deficit present.      Mental Status: She is alert.      Comments: AAOx2         Significant Labs:  CBC:   Recent Labs   Lab 09/14/20 0339   WBC 19.96*   RBC 2.79*   HGB 8.1*   HCT 26.2*      MCV 94   MCH 29.0   MCHC 30.9*     CMP:   Recent Labs   Lab 09/14/20 0339   *  125*   CALCIUM 7.8*  7.8*   ALBUMIN 1.7*  1.7*   PROT 5.1*  5.1*   *  146*   K 3.6  3.6   CO2 24  24     110   BUN 56*  56*   CREATININE 1.5*  1.5*   ALKPHOS 255*  255*   ALT 73*  73*   AST 83*  83*   BILITOT 2.8*  2.8*     Coagulation:   Recent Labs   Lab 09/14/20 0339   INR 1.4*   APTT 46.1*     All labs within the past 24 hours have been reviewed.     Significant Imaging:  Labs: Reviewed

## 2020-09-14 NOTE — PROGRESS NOTES
[facility]  General Surgery  Progress Note    Subjective:     History of Present Illness:  Ms. Hernandez is a 51yo F w/PMH of von Gierke disease s/p liver transplant (2002, on tacro + MMF, follows Dr. Nielsen), hx chronic rejection (bx 2015 with acute and chronic rejection s/p steroids), biliary stricture and ductopenic rejection, recently with cirrhosis on fibroscan (10/2019), HTN, and depression who presents as a transfer for further evaluation of gastric outlet obstruction and esophageal stricturing.  Patient decompensated requiring multiple pressors and intubation. CT scan showed free air. Prior to intubation patient reported severe abdominal pain.   states that patient has had epigastric pain, nausea, and vomiting for several days.     Post-Op Info:  Procedure(s) (LRB):  LAPAROTOMY, EXPLORATORY with abd closure (N/A)   8 Days Post-Op     Interval History:   Calm this AM  G tube to gravity, reportedly not flushing after adjustment yesterday.    Medications:  Continuous Infusions:   dextrose 5 % 50 mL/hr at 09/14/20 1200    TPN ADULT CENTRAL LINE CUSTOM       Scheduled Meds:   acetaminophen  650 mg Rectal Q6H    albuterol-ipratropium  3 mL Nebulization Q4H    diatrizoate meglumineand-diatrizoate sodium  25 mL Oral Once    fat emulsion 20%  250 mL Intravenous Daily    fluconazole (DIFLUCAN) IVPB  200 mg Intravenous Daily    heparin (porcine)  5,000 Units Subcutaneous Q8H    levothyroxine  40 mcg Intravenous Daily    nitroGLYCERIN 2% TD oint  0.5 inch Topical (Top) Q6H    olanzapine zydis  10 mg Oral QHS    pantoprazole  40 mg Intravenous Daily    piperacillin-tazobactam (ZOSYN) IVPB  4.5 g Intravenous Q8H    tacrolimus  0.5 mg Sublingual Daily AM    tuberculin  5 Units Intradermal Once     PRN Meds:calcium gluconate IVPB, calcium gluconate IVPB, calcium gluconate IVPB, dextrose 50%, dextrose 50%, glucagon (human recombinant), glucose, glucose, hydrALAZINE, insulin aspart  U-100, labetaloL, magnesium sulfate IVPB, magnesium sulfate IVPB, melatonin, ondansetron, potassium chloride in water **AND** potassium chloride in water **AND** potassium chloride in water, promethazine, sodium chloride 0.9%, sodium chloride 0.9%, sodium phosphate IVPB, sodium phosphate IVPB, sodium phosphate IVPB     Review of patient's allergies indicates:   Allergen Reactions    Codeine Itching     Other reaction(s): Itching    Lipitor [atorvastatin] Other (See Comments)     Other reaction(s): Muscle pain  Muscle cranmps    Morphine Itching     Other reaction(s): nausea and vomiting     Zoloft [sertraline] Other (See Comments)     Tremors/muscle spasms     Objective:     Vital Signs (Most Recent):  Temp: 99 °F (37.2 °C) (09/14/20 0300)  Pulse: 104 (09/14/20 1315)  Resp: (!) 21 (09/14/20 1315)  BP: 116/61 (09/14/20 1300)  SpO2: 97 % (09/14/20 1315) Vital Signs (24h Range):  Temp:  [98.7 °F (37.1 °C)-99 °F (37.2 °C)] 99 °F (37.2 °C)  Pulse:  [100-142] 104  Resp:  [17-46] 21  SpO2:  [76 %-100 %] 97 %  BP: (102-151)/() 116/61     Weight: 68 kg (149 lb 14.6 oz)  Body mass index is 30.28 kg/m².    Intake/Output - Last 3 Shifts       09/12 0700 - 09/13 0659 09/13 0700 - 09/14 0659 09/14 0700 - 09/15 0659    P.O.       I.V. (mL/kg) 1744 (25.6) 1770 (26)     NG/GT  750     Total Intake(mL/kg) 1744 (25.6) 2520 (37.1)     Urine (mL/kg/hr) 290 (0.2) 425 (0.3)     Drains 180 160     Stool 400 700     Total Output 870 1285     Net +874 +1235            Urine Occurrence 2 x 1 x 1 x    Stool Occurrence 3 x            Physical Exam  Constitutional:       General: She is not in acute distress.  Cardiovascular:      Rate and Rhythm: Regular rhythm. Tachycardia present.   Pulmonary:      Effort: Pulmonary effort is normal. No respiratory distress.   Abdominal:      General: There is no distension.      Palpations: Abdomen is soft.      Tenderness: There is no abdominal tenderness.      Comments: Incision c/d/i  Thick  drainage around G tube   Neurological:      General: No focal deficit present.      Mental Status: She is alert and oriented to person, place, and time.   Psychiatric:         Mood and Affect: Mood normal.         Behavior: Behavior normal.         Significant Labs:  CBC:   Recent Labs   Lab 09/14/20 0339   WBC 19.96*   RBC 2.79*   HGB 8.1*   HCT 26.2*      MCV 94   MCH 29.0   MCHC 30.9*     CMP:   Recent Labs   Lab 09/14/20 0339   *  125*   CALCIUM 7.8*  7.8*   ALBUMIN 1.7*  1.7*   PROT 5.1*  5.1*   *  146*   K 3.6  3.6   CO2 24  24     110   BUN 56*  56*   CREATININE 1.5*  1.5*   ALKPHOS 255*  255*   ALT 73*  73*   AST 83*  83*   BILITOT 2.8*  2.8*       Significant Diagnostics:  I have reviewed all pertinent imaging results/findings within the past 24 hours.    Assessment/Plan:     * Perforated abdominal viscus  50 y.o. female w/ free air on CT scan. S/p emergent ex lap on 9/4 w/ findings of gastric perforation, primary repair. Now 8 Days Post-Op from abdominal closure on 9/6, skin stapled closed, KERRY drains removed.     Continue SICU care  Leave G tube to gravity tonight.   Zosyn and diflucan for suspected intraabodminal source.  May need IR drain into peritoneal fluid collection  Continue delirium precautions, leigh seroquel  G tube study to confirm position          Cookie Juarez MD  General Surgery  [facility]

## 2020-09-14 NOTE — PLAN OF CARE
09/14/20 1132   Discharge Reassessment   Assessment Type Discharge Planning Reassessment   Discharge plan remains the same: Yes   Provided patient/caregiver education on the expected discharge date and the discharge plan No   Do you have any problems affording any of your prescribed medications? No   Discharge Plan A Other  (TBD: patient's discharge disposition will depend on prognosis)   DME Needed Upon Discharge  other (see comments)  (TBD)   Post-Acute Status   Discharge Delays None known at this time       Susana Pringle MPH, RN, CM  Ext. [phone]

## 2020-09-14 NOTE — PLAN OF CARE
POC reviewed with pt and . VSS. Afebrile, follows commands and moves all extremities. Pt disoriented to place and situation, pt does have slurred speech intermittently. Pt is very agitated and uncooperative, unable to give all meds due to pt refusal. MAP > 60, SBP < 190, sinus tachycardia, SpO2 > 90% on 2L NC. D5 @ 50. Pt bladder scanned with > 346 ml, in and out mei catheter performed with 425ml output. G tube is clamped per MD. Possible CT scan of abdomen to verify placement. BMS fell out during shift, lactulose not given, MD aware. 1 BM during shift. See flowsheet for additional details, will continue to monitor.     Skin: Bed plugged in, mattress inflated. Heel foams intact, SCDs on. Pt turned q2. Pressure points protected. Pt constantly pulling on restraints, skin assessed and no skin breakdown noted. See flowsheet for additional details on skin assessment.

## 2020-09-14 NOTE — ASSESSMENT & PLAN NOTE
Neuro:  -sedation: none  -analgesia: PRN oxy. Dilauded     CV:   - heparin gtt discontinued 9/9.   - HDS off of pressors    Pulm:  - Satting well on 2L NC this AM  - Continue to wean as tolerated  - Duonebs Q6     FEN/GI: Perforated stomach; s/p washout and patch (9/4); abdominal closure (9/6)  - s/p washout and closure on 9/6   - Tube feeds held  - G tube with mucoid drainage around insertion site following FWF.  Will discuss CT for further evaluation today  - Holding lactulose for now in setting of normal ammonia level (9/10) and improving mental status  - Daily metabolic panel with PRN repletion of electrolytes  - pantoprazole for ulcer ppx  - D5W @ 50/hr     Renal: Pt with JACQUE upon arrival; BUN/Cr: 32/2.1 (9/4)  - JACQUE improving  - Continue to monitor with daily metabolic labs  - nephrology on board, appreciating recs  - DC Bhardwaj  - BMP this afternoon     HEME/ID:   - Hb stable, WBC downtrending  - Daily CBC  - s/p Liver transplant in 2002; Daily tacrolimus level with AM labs. Per hepatology, restart tacrolimus 1mg BID  - Consult ID  - Diflucan and Zosyn  - Lovenox for DVT ppx     Endo:  - Hx of hypothyroidism; not taking synthroid at home    Dispo:  - Continue SICU care

## 2020-09-14 NOTE — PLAN OF CARE
"   SICU PLAN OF CARE NOTE    Dx: Perforated abdominal viscus    Goals of Care: MAP >60, SBP <180, Accucheck Q4, return to previous level of functioning, PT/OT eval ordered    Vital Signs: /61   Pulse (!) 112   Temp 98.9 °F (37.2 °C) (Oral)   Resp (!) 32   Ht 4' 11" (1.499 m)   Wt 68 kg (149 lb 14.6 oz)   SpO2 97%   BMI 30.28 kg/m²     Exam: agitated, well developed, severe distress, mildly obese, worsening confusion throughout shift    Cardiac: sinus tachycardia    Resp: 2L NC    Neuro: Arouses to Voice, Follows Commands, Moves All Extremities and Confused    Gtts:  D5 @50    Urine Output: Incontinent 3 unmeasured urine outputs, 2 blue pads saturated, external female catheter removed due to potential skin breakdown    Drains:   G Tube to gravity: 190 cc/shift    Diet: NPO     Labs/Accuchecks: daily labs, accuchecks Q4    Skin: all skin monitored for breakdown, mid abdomen incision GABY with staples intact, G tube dressing CDI, R wrist foam dressing in place, bruising noted to R wrist, heel and sacral foams in place, no breakdown noted underneath, on surrounding area on buttocks non blanchable redness noted, BMS tolerated well by patient, no signs of discomfort or breakdown, all pressure points elevated and protected, immerse bed plugged into wall, pt very restless and agitated, restraints applied, pt scratched her leg with fingernails and caused altered skin integrity, cleansed and foam dressing applied.     Shift Events: all VSS at this time, pt became increasingly agitated and confused throughout shift, remains oriented to person, but intermittently oriented to place, pt remains NPO with G tube to gravity, water boluses restarted and clamped accordingly, G tube dressings became saturated with thick mucus like discharge, MD notified and G tube placement checked, BMS in place with scheduled lactulose enemas, Pupils remain unequal in size, verified with pupilometer, both pupils remain brisk and reactive " to light, mental status assessed with MD at bedside, will continue to monitor patient at this time,  at bedside during shift, updated on pt current plan, all questions answered and emotional support provided.

## 2020-09-15 LAB
ABO + RH BLD: NORMAL
ALBUMIN SERPL BCP-MCNC: 1.5 G/DL (ref 3.5–5.2)
ALLENS TEST: ABNORMAL
ALP SERPL-CCNC: 198 U/L (ref 55–135)
ALT SERPL W/O P-5'-P-CCNC: 53 U/L (ref 10–44)
ANION GAP SERPL CALC-SCNC: 13 MMOL/L (ref 8–16)
ANISOCYTOSIS BLD QL SMEAR: SLIGHT
ANISOCYTOSIS BLD QL SMEAR: SLIGHT
APTT BLDCRRT: 40.7 SEC (ref 21–32)
AST SERPL-CCNC: 46 U/L (ref 10–40)
BASOPHILS # BLD AUTO: 0.01 K/UL (ref 0–0.2)
BASOPHILS # BLD AUTO: 0.03 K/UL (ref 0–0.2)
BASOPHILS NFR BLD: 0.1 % (ref 0–1.9)
BASOPHILS NFR BLD: 0.3 % (ref 0–1.9)
BILIRUB SERPL-MCNC: 2.4 MG/DL (ref 0.1–1)
BLD GP AB SCN CELLS X3 SERPL QL: NORMAL
BLD PROD TYP BPU: NORMAL
BLOOD UNIT EXPIRATION DATE: NORMAL
BLOOD UNIT TYPE CODE: 7300
BLOOD UNIT TYPE: NORMAL
BUN SERPL-MCNC: 46 MG/DL (ref 6–20)
BURR CELLS BLD QL SMEAR: ABNORMAL
CA-I BLDV-SCNC: 1.09 MMOL/L (ref 1.06–1.42)
CALCIUM SERPL-MCNC: 7.6 MG/DL (ref 8.7–10.5)
CHLORIDE SERPL-SCNC: 110 MMOL/L (ref 95–110)
CO2 SERPL-SCNC: 23 MMOL/L (ref 23–29)
CODING SYSTEM: NORMAL
CREAT SERPL-MCNC: 1.4 MG/DL (ref 0.5–1.4)
DELSYS: ABNORMAL
DIFFERENTIAL METHOD: ABNORMAL
DIFFERENTIAL METHOD: ABNORMAL
DISPENSE STATUS: NORMAL
EOSINOPHIL # BLD AUTO: 0.2 K/UL (ref 0–0.5)
EOSINOPHIL # BLD AUTO: 0.2 K/UL (ref 0–0.5)
EOSINOPHIL NFR BLD: 1.4 % (ref 0–8)
EOSINOPHIL NFR BLD: 1.5 % (ref 0–8)
ERYTHROCYTE [DISTWIDTH] IN BLOOD BY AUTOMATED COUNT: 16.3 % (ref 11.5–14.5)
ERYTHROCYTE [DISTWIDTH] IN BLOOD BY AUTOMATED COUNT: 17.6 % (ref 11.5–14.5)
EST. GFR  (AFRICAN AMERICAN): 49.8 ML/MIN/1.73 M^2
EST. GFR  (NON AFRICAN AMERICAN): 43.2 ML/MIN/1.73 M^2
FLOW: 2
GLUCOSE SERPL-MCNC: 118 MG/DL (ref 70–110)
HCO3 UR-SCNC: 23.2 MMOL/L (ref 24–28)
HCT VFR BLD AUTO: 22.8 % (ref 37–48.5)
HCT VFR BLD AUTO: 28.4 % (ref 37–48.5)
HGB BLD-MCNC: 7 G/DL (ref 12–16)
HGB BLD-MCNC: 8.8 G/DL (ref 12–16)
HYPOCHROMIA BLD QL SMEAR: ABNORMAL
HYPOCHROMIA BLD QL SMEAR: ABNORMAL
IMM GRANULOCYTES # BLD AUTO: 0.19 K/UL (ref 0–0.04)
IMM GRANULOCYTES # BLD AUTO: 0.3 K/UL (ref 0–0.04)
IMM GRANULOCYTES NFR BLD AUTO: 1.8 % (ref 0–0.5)
IMM GRANULOCYTES NFR BLD AUTO: 2.6 % (ref 0–0.5)
INR PPP: 1.4 (ref 0.8–1.2)
LYMPHOCYTES # BLD AUTO: 0.7 K/UL (ref 1–4.8)
LYMPHOCYTES # BLD AUTO: 0.8 K/UL (ref 1–4.8)
LYMPHOCYTES NFR BLD: 6.4 % (ref 18–48)
LYMPHOCYTES NFR BLD: 7.5 % (ref 18–48)
MAGNESIUM SERPL-MCNC: 2.1 MG/DL (ref 1.6–2.6)
MCH RBC QN AUTO: 28.6 PG (ref 27–31)
MCH RBC QN AUTO: 29 PG (ref 27–31)
MCHC RBC AUTO-ENTMCNC: 30.7 G/DL (ref 32–36)
MCHC RBC AUTO-ENTMCNC: 31 G/DL (ref 32–36)
MCV RBC AUTO: 92 FL (ref 82–98)
MCV RBC AUTO: 95 FL (ref 82–98)
MODE: ABNORMAL
MONOCYTES # BLD AUTO: 1.3 K/UL (ref 0.3–1)
MONOCYTES # BLD AUTO: 1.5 K/UL (ref 0.3–1)
MONOCYTES NFR BLD: 11.9 % (ref 4–15)
MONOCYTES NFR BLD: 13.2 % (ref 4–15)
NEUTROPHILS # BLD AUTO: 8.3 K/UL (ref 1.8–7.7)
NEUTROPHILS # BLD AUTO: 8.8 K/UL (ref 1.8–7.7)
NEUTROPHILS NFR BLD: 76.1 % (ref 38–73)
NEUTROPHILS NFR BLD: 77.2 % (ref 38–73)
NRBC BLD-RTO: 1 /100 WBC
NRBC BLD-RTO: 1 /100 WBC
NUM UNITS TRANS PACKED RBC: NORMAL
OVALOCYTES BLD QL SMEAR: ABNORMAL
OVALOCYTES BLD QL SMEAR: ABNORMAL
PCO2 BLDA: 30.5 MMHG (ref 35–45)
PH SMN: 7.49 [PH] (ref 7.35–7.45)
PHOSPHATE SERPL-MCNC: 2.9 MG/DL (ref 2.7–4.5)
PLATELET # BLD AUTO: 172 K/UL (ref 150–350)
PLATELET # BLD AUTO: 172 K/UL (ref 150–350)
PLATELET BLD QL SMEAR: ABNORMAL
PMV BLD AUTO: 10.8 FL (ref 9.2–12.9)
PMV BLD AUTO: 11.2 FL (ref 9.2–12.9)
PO2 BLDA: 31 MMHG (ref 40–60)
POC BE: 0 MMOL/L
POC SATURATED O2: 66 % (ref 95–100)
POC TCO2: 24 MMOL/L (ref 24–29)
POCT GLUCOSE: 121 MG/DL (ref 70–110)
POCT GLUCOSE: 122 MG/DL (ref 70–110)
POCT GLUCOSE: 128 MG/DL (ref 70–110)
POCT GLUCOSE: 128 MG/DL (ref 70–110)
POCT GLUCOSE: 141 MG/DL (ref 70–110)
POIKILOCYTOSIS BLD QL SMEAR: SLIGHT
POIKILOCYTOSIS BLD QL SMEAR: SLIGHT
POLYCHROMASIA BLD QL SMEAR: ABNORMAL
POLYCHROMASIA BLD QL SMEAR: ABNORMAL
POTASSIUM SERPL-SCNC: 3.2 MMOL/L (ref 3.5–5.1)
PROT SERPL-MCNC: 4.9 G/DL (ref 6–8.4)
PROTHROMBIN TIME: 15.1 SEC (ref 9–12.5)
RBC # BLD AUTO: 2.41 M/UL (ref 4–5.4)
RBC # BLD AUTO: 3.08 M/UL (ref 4–5.4)
SAMPLE: ABNORMAL
SITE: ABNORMAL
SODIUM SERPL-SCNC: 146 MMOL/L (ref 136–145)
TACROLIMUS BLD-MCNC: 4.3 NG/ML (ref 5–15)
TRIGL SERPL-MCNC: 151 MG/DL (ref 30–150)
WBC # BLD AUTO: 10.79 K/UL (ref 3.9–12.7)
WBC # BLD AUTO: 11.53 K/UL (ref 3.9–12.7)

## 2020-09-15 PROCEDURE — P9016 RBC LEUKOCYTES REDUCED: HCPCS

## 2020-09-15 PROCEDURE — 63600175 PHARM REV CODE 636 W HCPCS: Performed by: STUDENT IN AN ORGANIZED HEALTH CARE EDUCATION/TRAINING PROGRAM

## 2020-09-15 PROCEDURE — 99291 PR CRITICAL CARE, E/M 30-74 MINUTES: ICD-10-PCS | Mod: 24,GC,, | Performed by: SURGERY

## 2020-09-15 PROCEDURE — 25000003 PHARM REV CODE 250: Performed by: STUDENT IN AN ORGANIZED HEALTH CARE EDUCATION/TRAINING PROGRAM

## 2020-09-15 PROCEDURE — 86920 COMPATIBILITY TEST SPIN: CPT

## 2020-09-15 PROCEDURE — 85025 COMPLETE CBC W/AUTO DIFF WBC: CPT

## 2020-09-15 PROCEDURE — 84100 ASSAY OF PHOSPHORUS: CPT

## 2020-09-15 PROCEDURE — C9113 INJ PANTOPRAZOLE SODIUM, VIA: HCPCS | Performed by: STUDENT IN AN ORGANIZED HEALTH CARE EDUCATION/TRAINING PROGRAM

## 2020-09-15 PROCEDURE — 99232 PR SUBSEQUENT HOSPITAL CARE,LEVL II: ICD-10-PCS | Mod: ,,, | Performed by: INTERNAL MEDICINE

## 2020-09-15 PROCEDURE — 20000000 HC ICU ROOM

## 2020-09-15 PROCEDURE — 99900035 HC TECH TIME PER 15 MIN (STAT)

## 2020-09-15 PROCEDURE — 82330 ASSAY OF CALCIUM: CPT

## 2020-09-15 PROCEDURE — 80197 ASSAY OF TACROLIMUS: CPT

## 2020-09-15 PROCEDURE — 82803 BLOOD GASES ANY COMBINATION: CPT

## 2020-09-15 PROCEDURE — B4185 PARENTERAL SOL 10 GM LIPIDS: HCPCS | Performed by: SURGERY

## 2020-09-15 PROCEDURE — A4217 STERILE WATER/SALINE, 500 ML: HCPCS | Performed by: SURGERY

## 2020-09-15 PROCEDURE — 99291 CRITICAL CARE FIRST HOUR: CPT | Mod: 24,GC,, | Performed by: SURGERY

## 2020-09-15 PROCEDURE — 63600175 PHARM REV CODE 636 W HCPCS: Performed by: SURGERY

## 2020-09-15 PROCEDURE — 80053 COMPREHEN METABOLIC PANEL: CPT

## 2020-09-15 PROCEDURE — 25000003 PHARM REV CODE 250: Performed by: INTERNAL MEDICINE

## 2020-09-15 PROCEDURE — 25000003 PHARM REV CODE 250: Performed by: SURGERY

## 2020-09-15 PROCEDURE — 84478 ASSAY OF TRIGLYCERIDES: CPT

## 2020-09-15 PROCEDURE — 85610 PROTHROMBIN TIME: CPT

## 2020-09-15 PROCEDURE — 25000242 PHARM REV CODE 250 ALT 637 W/ HCPCS: Performed by: SURGERY

## 2020-09-15 PROCEDURE — 27000221 HC OXYGEN, UP TO 24 HOURS

## 2020-09-15 PROCEDURE — 94761 N-INVAS EAR/PLS OXIMETRY MLT: CPT

## 2020-09-15 PROCEDURE — 85730 THROMBOPLASTIN TIME PARTIAL: CPT

## 2020-09-15 PROCEDURE — 99232 SBSQ HOSP IP/OBS MODERATE 35: CPT | Mod: ,,, | Performed by: INTERNAL MEDICINE

## 2020-09-15 PROCEDURE — 94640 AIRWAY INHALATION TREATMENT: CPT

## 2020-09-15 PROCEDURE — 86850 RBC ANTIBODY SCREEN: CPT

## 2020-09-15 PROCEDURE — 83735 ASSAY OF MAGNESIUM: CPT

## 2020-09-15 RX ORDER — IPRATROPIUM BROMIDE AND ALBUTEROL SULFATE 2.5; .5 MG/3ML; MG/3ML
3 SOLUTION RESPIRATORY (INHALATION) EVERY 4 HOURS PRN
Status: DISCONTINUED | OUTPATIENT
Start: 2020-09-15 | End: 2020-09-21

## 2020-09-15 RX ORDER — HYDROCODONE BITARTRATE AND ACETAMINOPHEN 500; 5 MG/1; MG/1
TABLET ORAL
Status: DISCONTINUED | OUTPATIENT
Start: 2020-09-15 | End: 2020-09-28

## 2020-09-15 RX ORDER — QUETIAPINE FUMARATE 100 MG/1
100 TABLET, FILM COATED ORAL 2 TIMES DAILY
Status: DISCONTINUED | OUTPATIENT
Start: 2020-09-15 | End: 2020-09-18

## 2020-09-15 RX ORDER — ACETAMINOPHEN 325 MG/1
650 TABLET ORAL EVERY 6 HOURS
Status: DISCONTINUED | OUTPATIENT
Start: 2020-09-15 | End: 2020-09-16

## 2020-09-15 RX ADMIN — LEVOTHYROXINE SODIUM ANHYDROUS 40 MCG: 100 INJECTION, POWDER, LYOPHILIZED, FOR SOLUTION INTRAVENOUS at 08:09

## 2020-09-15 RX ADMIN — IPRATROPIUM BROMIDE AND ALBUTEROL SULFATE 3 ML: .5; 2.5 SOLUTION RESPIRATORY (INHALATION) at 04:09

## 2020-09-15 RX ADMIN — I.V. FAT EMULSION 250 ML: 20 EMULSION INTRAVENOUS at 09:09

## 2020-09-15 RX ADMIN — FLUCONAZOLE 200 MG: 2 INJECTION, SOLUTION INTRAVENOUS at 09:09

## 2020-09-15 RX ADMIN — HEPARIN SODIUM 5000 UNITS: 5000 INJECTION INTRAVENOUS; SUBCUTANEOUS at 05:09

## 2020-09-15 RX ADMIN — IPRATROPIUM BROMIDE AND ALBUTEROL SULFATE 3 ML: .5; 2.5 SOLUTION RESPIRATORY (INHALATION) at 12:09

## 2020-09-15 RX ADMIN — HALOPERIDOL LACTATE 5 MG: 5 INJECTION, SOLUTION INTRAMUSCULAR at 04:09

## 2020-09-15 RX ADMIN — HEPARIN SODIUM 5000 UNITS: 5000 INJECTION INTRAVENOUS; SUBCUTANEOUS at 02:09

## 2020-09-15 RX ADMIN — PIPERACILLIN SODIUM AND TAZOBACTAM SODIUM 4.5 G: 4; .5 INJECTION, POWDER, LYOPHILIZED, FOR SOLUTION INTRAVENOUS at 09:09

## 2020-09-15 RX ADMIN — QUETIAPINE FUMARATE 100 MG: 100 TABLET ORAL at 08:09

## 2020-09-15 RX ADMIN — ACETAMINOPHEN 650 MG: 325 TABLET ORAL at 11:09

## 2020-09-15 RX ADMIN — PANTOPRAZOLE SODIUM 40 MG: 40 INJECTION, POWDER, LYOPHILIZED, FOR SOLUTION INTRAVENOUS at 08:09

## 2020-09-15 RX ADMIN — PIPERACILLIN SODIUM AND TAZOBACTAM SODIUM 4.5 G: 4; .5 INJECTION, POWDER, LYOPHILIZED, FOR SOLUTION INTRAVENOUS at 12:09

## 2020-09-15 RX ADMIN — MELATONIN TAB 3 MG 6 MG: 3 TAB at 08:09

## 2020-09-15 RX ADMIN — PIPERACILLIN SODIUM AND TAZOBACTAM SODIUM 4.5 G: 4; .5 INJECTION, POWDER, LYOPHILIZED, FOR SOLUTION INTRAVENOUS at 03:09

## 2020-09-15 RX ADMIN — HEPARIN SODIUM 5000 UNITS: 5000 INJECTION INTRAVENOUS; SUBCUTANEOUS at 09:09

## 2020-09-15 RX ADMIN — QUETIAPINE FUMARATE 100 MG: 100 TABLET ORAL at 10:09

## 2020-09-15 RX ADMIN — POTASSIUM CHLORIDE 60 MEQ: 14.9 INJECTION, SOLUTION INTRAVENOUS at 09:09

## 2020-09-15 RX ADMIN — MAGNESIUM SULFATE HEPTAHYDRATE: 500 INJECTION, SOLUTION INTRAMUSCULAR; INTRAVENOUS at 09:09

## 2020-09-15 RX ADMIN — TACROLIMUS 0.5 MG: 0.5 CAPSULE ORAL at 07:09

## 2020-09-15 RX ADMIN — ACETAMINOPHEN 650 MG: 325 TABLET ORAL at 05:09

## 2020-09-15 RX ADMIN — ACETAMINOPHEN 650 MG: 650 SUPPOSITORY RECTAL at 07:09

## 2020-09-15 RX ADMIN — IPRATROPIUM BROMIDE AND ALBUTEROL SULFATE 3 ML: .5; 2.5 SOLUTION RESPIRATORY (INHALATION) at 09:09

## 2020-09-15 NOTE — SUBJECTIVE & OBJECTIVE
Interval History: Patient seen and examined at bedside, spiked fever of 101 overnight. UOP At 820cc/24 hrs.     Review of patient's allergies indicates:   Allergen Reactions    Codeine Itching     Other reaction(s): Itching    Lipitor [atorvastatin] Other (See Comments)     Other reaction(s): Muscle pain  Muscle cranmps    Morphine Itching     Other reaction(s): nausea and vomiting     Zoloft [sertraline] Other (See Comments)     Tremors/muscle spasms     Current Facility-Administered Medications   Medication Frequency    0.9%  NaCl infusion (for blood administration) Q24H PRN    acetaminophen tablet 650 mg Q6H    albuterol-ipratropium 2.5 mg-0.5 mg/3 mL nebulizer solution 3 mL Q4H PRN    calcium gluconate 1g in dextrose 5% 100mL (ready to mix system) PRN    calcium gluconate 2 g in dextrose 5 % 100 mL IVPB PRN    calcium gluconate 3 g in dextrose 5 % 100 mL IVPB PRN    dextrose 50% injection 12.5 g PRN    dextrose 50% injection 25 g PRN    fat emulsion 20% infusion 250 mL Daily    fluconazole (DIFLUCAN) IVPB 200 mg 100 mL Daily    glucagon (human recombinant) injection 1 mg PRN    glucose chewable tablet 16 g PRN    glucose chewable tablet 24 g PRN    haloperidol lactate injection 5 mg Q6H PRN    heparin (porcine) injection 5,000 Units Q8H    hydrALAZINE injection 10 mg Q8H PRN    insulin aspart U-100 pen 1-10 Units Q4H PRN    labetalol 20 mg/4 mL (5 mg/mL) IV syring Q6H PRN    levothyroxine injection 40 mcg Daily    magnesium sulfate 2g in water 50mL IVPB (premix) PRN    magnesium sulfate 2g in water 50mL IVPB (premix) PRN    melatonin tablet 6 mg Nightly PRN    ondansetron injection 4 mg Q8H PRN    pantoprazole injection 40 mg Daily    piperacillin-tazobactam 4.5 g in sodium chloride 0.9% 100 mL IVPB (ready to mix system) Q8H    potassium chloride 40 mEq in 100 mL IVPB (FOR CENTRAL LINE ADMINISTRATION ONLY) PRN    And    potassium chloride 20 mEq in 100 mL IVPB (FOR CENTRAL LINE  ADMINISTRATION ONLY) PRN    And    potassium chloride 40 mEq in 100 mL IVPB (FOR CENTRAL LINE ADMINISTRATION ONLY) PRN    promethazine tablet 25 mg Q6H PRN    QUEtiapine tablet 100 mg BID    sodium chloride 0.9% flush 10 mL PRN    sodium chloride 0.9% flush 10 mL PRN    sodium phosphate 15 mmol in dextrose 5 % 250 mL IVPB PRN    sodium phosphate 20.01 mmol in dextrose 5 % 250 mL IVPB PRN    sodium phosphate 30 mmol in dextrose 5 % 250 mL IVPB PRN    tacrolimus capsule 0.5 mg Daily AM    TPN ADULT CENTRAL LINE CUSTOM Continuous       Objective:     Vital Signs (Most Recent):  Temp: 100.2 °F (37.9 °C) (09/15/20 1330)  Pulse: 107 (09/15/20 1330)  Resp: (!) 30 (09/15/20 1330)  BP: (!) 118/56 (09/15/20 1330)  SpO2: 97 % (09/15/20 1330)  O2 Device (Oxygen Therapy): nasal cannula w/ humidification (09/15/20 1300) Vital Signs (24h Range):  Temp:  [98 °F (36.7 °C)-100.8 °F (38.2 °C)] 100.2 °F (37.9 °C)  Pulse:  [] 107  Resp:  [18-46] 30  SpO2:  [84 %-100 %] 97 %  BP: ()/(48-96) 118/56     Weight: 68 kg (149 lb 14.6 oz) (09/13/20 0500)  Body mass index is 30.28 kg/m².  Body surface area is 1.68 meters squared.    I/O last 3 completed shifts:  In: 2139 [I.V.:1639; NG/GT:500]  Out: 1320 [Urine:1245; Drains:75]    Physical Exam  Constitutional:       General: She is not in acute distress.  Cardiovascular:      Rate and Rhythm: Regular rhythm. Tachycardia present.   Pulmonary:      Effort: Pulmonary effort is normal. No respiratory distress.   Abdominal:      General: There is no distension.      Palpations: Abdomen is soft.      Tenderness: There is no abdominal tenderness.      Comments: Incision c/d/i  Thick drainage around G tube   Neurological:      General: No focal deficit present.      Mental Status: She is alert and oriented to person, place, and time.   Psychiatric:         Mood and Affect: Mood normal.         Behavior: Behavior normal.         Significant Labs:  CBC:   Recent Labs   Lab  09/15/20  1000   WBC 11.53   RBC 3.08*   HGB 8.8*   HCT 28.4*      MCV 92   MCH 28.6   MCHC 31.0*     CMP:   Recent Labs   Lab 09/15/20  0314   *   CALCIUM 7.6*   ALBUMIN 1.5*   PROT 4.9*   *   K 3.2*   CO2 23      BUN 46*   CREATININE 1.4   ALKPHOS 198*   ALT 53*   AST 46*   BILITOT 2.4*     Coagulation:   Recent Labs   Lab 09/15/20  0314   INR 1.4*   APTT 40.7*     All labs within the past 24 hours have been reviewed.     Significant Imaging:  Labs: Reviewed

## 2020-09-15 NOTE — ASSESSMENT & PLAN NOTE
Neuro:  -sedation: none  -analgesia: PRN oxy. Dilauded     CV:   - heparin gtt discontinued 9/9.   - HDS off of pressors    Pulm:  - Satting well on 2L NC this AM  - Continue to wean as tolerated  - Duonebs Q6     FEN/GI: Perforated stomach; s/p washout and patch (9/4); abdominal closure (9/6)  - s/p washout and closure on 9/6   - Tube feeds held  - G tube with mucoid drainage around insertion site following FWF.  Will discuss CT for further evaluation today  - Lactulose discontinued (9/14) in setting of normal ammonia level (9/10) and improving mental status  - Daily metabolic panel with PRN repletion of electrolytes  - pantoprazole for ulcer ppx  - D5W @ 50/hr     Renal: Pt with JACQUE upon arrival; BUN/Cr: 32/2.1 (9/4)  - JACQUE improving  - Continue to monitor with daily metabolic labs  - nephrology on board, appreciate recs     HEME/ID:   - Transfused 1U PRBC this morning (9/15).    - WBC downtrending  - Daily CBC  - s/p Liver transplant in 2002; Daily tacrolimus level with AM labs. Per hepatology, continue tacrolimus 1mg BID  - Consult ID  - Diflucan and Zosyn  - Lovenox for DVT ppx     Endo:  - Hx of hypothyroidism; not taking synthroid at home  - Synthroid 40mcg daily    Dispo:  - Continue SICU care

## 2020-09-15 NOTE — PROGRESS NOTES
[facility]  Critical Care - Surgery  Progress Note    Patient Name: Elda Hernandez  MRN: [id number]  Admission Date: 9/3/2020  Hospital Length of Stay: 12 days  Code Status: Full Code  Attending Provider: Clark Rodriguez MD  Primary Care Provider: Jordana Woods MD   Principal Problem: Perforated abdominal viscus    Subjective:     Hospital/ICU Course:  9/4: Pt admitted to SICU following ex lap for GI perf. Intubated, sedated. Wound vac in place.  Soon after arrival to unit a mottled appearance to RUE was noted.  Vascular consulted and US revealed R radial artery thrombosis.    9/5: Tachycardic..  R arm appearance greatly improved overnight.    9/6: Patient returned to OR for abdominal washout, closure. R arm with dopplerable signals to ulnar artery and palmar arch.   9/7: Attempted to wean patient off of sedation in an effort to move towards extubation. Patient weaned off of propofol. Precedex started. Patient's heart rate very labile and sensitize to sedation.   9/8: patient extubated  9/9: SHANICE. Labetalol PRN for HTN   9/10:  Diuresis increased.  Cr downtrending  9/11: Increased abdominal distension and elevated WBC count. CT abd  9/12: WBC to 45 today. Diflucan and zosyn initiated. Lactulose enema w/ improving mental status  9/13: WBC improved. Mental status improving. FWF initiated for hypernatremia.   9/14: White count continues to improve.  Oriented to person only this morning.      Interval History/Significant Events: Pt increasingly agitated last night despite scheduled olanzapine.  5mg haldol given.  Transfused 1U PRBCs this morning for Hb 8.1-->7.0    Pt denies any new complaints.  Oriented to person/place but not time.      Follow-up For: Procedure(s) (LRB):  LAPAROTOMY, EXPLORATORY with abd closure (N/A)    Post-Operative Day: 9 Days Post-Op    Objective:     Vital Signs (Most Recent):  Temp: 99.2 °F (37.3 °C) (09/15/20 0300)  Pulse: 107 (09/15/20 0630)  Resp: (!) 25 (09/15/20  0630)  BP: 114/84 (09/15/20 0630)  SpO2: 95 % (09/15/20 0630) Vital Signs (24h Range):  Temp:  [98 °F (36.7 °C)-99.8 °F (37.7 °C)] 99.2 °F (37.3 °C)  Pulse:  [] 107  Resp:  [17-44] 25  SpO2:  [84 %-100 %] 95 %  BP: (104-148)/() 114/84     Weight: 68 kg (149 lb 14.6 oz)  Body mass index is 30.28 kg/m².      Intake/Output Summary (Last 24 hours) at 9/15/2020 0719  Last data filed at 9/15/2020 0600  Gross per 24 hour   Intake 919 ml   Output 895 ml   Net 24 ml       Physical Exam  Vitals signs and nursing note reviewed.   Constitutional:       General: She is not in acute distress.  HENT:      Head: Normocephalic and atraumatic.      Nose: Nose normal.      Mouth/Throat:      Mouth: Mucous membranes are moist.   Eyes:      Extraocular Movements: Extraocular movements intact.   Neck:      Musculoskeletal: Normal range of motion.      Comments: RIJ central line  Cardiovascular:      Rate and Rhythm: Regular rhythm. Tachycardia present.      Heart sounds: Normal heart sounds.   Pulmonary:      Breath sounds: Normal breath sounds. No wheezing.   Abdominal:      Comments: Midline incision with dressing in place; dressing dry, intact with old blood stain; G tube to gravity with moderate output   Musculoskeletal: Normal range of motion.   Skin:     General: Skin is warm and dry.      Coloration: Skin is not jaundiced or pale.      Comments: L peringuinal hematoma.  LLE pulses 2+.   Neurological:      General: No focal deficit present.      Mental Status: She is alert.      Comments: AAOx2         Vents:  Vent Mode: Spont/T (09/08/20 1607)  Ventilator Initiated: Yes (09/04/20 1130)  Set Rate: 14 BPM (09/08/20 1142)  Vt Set: 330 mL (09/08/20 1142)  Pressure Support: 5 cmH20 (09/08/20 1607)  PEEP/CPAP: 5 cmH20 (09/08/20 1607)  Oxygen Concentration (%): 28 (09/15/20 0001)  Peak Airway Pressure: 21 cmH2O (09/08/20 1142)  Plateau Pressure: 14 cmH20 (09/08/20 0850)  Total Ve: 4.54 mL (09/08/20 1142)  Negative Inspiratory  Force (cm H2O): -35 (09/08/20 1607)  F/VT Ratio<105 (RSBI): (!) 43.21 (09/08/20 1142)    Lines/Drains/Airways     Central Venous Catheter Line            Percutaneous Central Line Insertion/Assessment - Triple Lumen  09/04/20 0800 right internal jugular 10 days          Drain                 Gastrostomy/Enterostomy 09/04/20 LUQ decompression 11 days         Urethral Catheter 09/14/20 1330 less than 1 day                Significant Labs:    CBC/Anemia Profile:  Recent Labs   Lab 09/13/20  2053 09/14/20  0339 09/15/20  0314   WBC 24.38* 19.96* 10.79   HGB 8.0* 8.1* 7.0*   HCT 26.9* 26.2* 22.8*    190 172   MCV 95 94 95   RDW 17.5* 17.2* 17.6*        Chemistries:  Recent Labs   Lab 09/13/20  2053 09/14/20  0339 09/15/20  0314   * 146*  146*  --    K 3.7 3.6  3.6  --    * 110  110  --    CO2 24 24  24  --    BUN 59* 56*  56*  --    CREATININE 1.6* 1.5*  1.5*  --    CALCIUM 7.8* 7.8*  7.8*  --    ALBUMIN 1.7* 1.7*  1.7*  --    PROT 5.0* 5.1*  5.1*  --    BILITOT 2.9* 2.8*  2.8*  --    ALKPHOS 216* 255*  255*  --    ALT 72* 73*  73*  --    AST 80* 83*  83*  --    MG 2.2 2.2 2.1   PHOS 3.4 3.7 2.9       All pertinent labs within the past 24 hours have been reviewed.    Significant Imaging:  I have reviewed all pertinent imaging results/findings within the past 24 hours.    Assessment/Plan:     * Perforated abdominal viscus  Neuro:  -sedation: none  -analgesia: PRN oxy. Dilauded  - Seroquel 100mg BID per G tube  - Haldol PRN agitation     CV:   - heparin gtt discontinued 9/9.   - HDS off of pressors    Pulm:  - Satting well on 2L NC this AM  - Continue to wean as tolerated  - Duonebs Q6     FEN/GI: Perforated stomach; s/p washout and patch (9/4); abdominal closure (9/6)  - s/p washout and closure on 9/6   - Tube feeds held; will restart at trickle today and monitor tolerance  - G tube with mucoid drainage around insertion site following FWF.  Will discuss CT for further evaluation today  -  Lactulose discontinued (9/14) in setting of normal ammonia level (9/10) and improving mental status  - Daily metabolic panel with PRN repletion of electrolytes  - pantoprazole for ulcer ppx  - D5W @ 50/hr     Renal: Pt with JACQUE upon arrival; BUN/Cr: 32/2.1 (9/4)  - JACQUE improving  - Continue to monitor with daily metabolic labs  - nephrology on board, appreciate recs     HEME/ID:   - Transfused 1U PRBC this morning (9/15).    - WBC downtrending  - Daily CBC  - s/p Liver transplant in 2002; Daily tacrolimus level with AM labs. Per hepatology, continue tacrolimus 1mg BID  - Consult ID  - Diflucan and Zosyn  - Lovenox for DVT ppx     Endo:  - Hx of hypothyroidism; not taking synthroid at home  - Synthroid 40mcg daily    Dispo:  - Continue SICU care      Critical care was time spent personally by me on the following activities: development of treatment plan with patient or surrogate and bedside caregivers, discussions with consultants, evaluation of patient's response to treatment, examination of patient, ordering and performing treatments and interventions, ordering and review of laboratory studies, ordering and review of radiographic studies, pulse oximetry, re-evaluation of patient's condition.  This critical care time did not overlap with that of any other provider or involve time for any procedures.     John Spivey MD  Critical Care - Surgery  [facility]

## 2020-09-15 NOTE — SUBJECTIVE & OBJECTIVE
Interval History/Significant Events: Pt increasingly agitated last night despite scheduled olanzapine.  5mg haldol given.  Transfused 1U PRBCs this morning for Hb 8.1-->7.0    Pt denies any new complaints.  Oriented to person/place but not time.      Follow-up For: Procedure(s) (LRB):  LAPAROTOMY, EXPLORATORY with abd closure (N/A)    Post-Operative Day: 9 Days Post-Op    Objective:     Vital Signs (Most Recent):  Temp: 99.2 °F (37.3 °C) (09/15/20 0300)  Pulse: 107 (09/15/20 0630)  Resp: (!) 25 (09/15/20 0630)  BP: 114/84 (09/15/20 0630)  SpO2: 95 % (09/15/20 0630) Vital Signs (24h Range):  Temp:  [98 °F (36.7 °C)-99.8 °F (37.7 °C)] 99.2 °F (37.3 °C)  Pulse:  [] 107  Resp:  [17-44] 25  SpO2:  [84 %-100 %] 95 %  BP: (104-148)/() 114/84     Weight: 68 kg (149 lb 14.6 oz)  Body mass index is 30.28 kg/m².      Intake/Output Summary (Last 24 hours) at 9/15/2020 0719  Last data filed at 9/15/2020 0600  Gross per 24 hour   Intake 919 ml   Output 895 ml   Net 24 ml       Physical Exam  Vitals signs and nursing note reviewed.   Constitutional:       General: She is not in acute distress.  HENT:      Head: Normocephalic and atraumatic.      Nose: Nose normal.      Mouth/Throat:      Mouth: Mucous membranes are moist.   Eyes:      Extraocular Movements: Extraocular movements intact.   Neck:      Musculoskeletal: Normal range of motion.      Comments: RIJ central line  Cardiovascular:      Rate and Rhythm: Regular rhythm. Tachycardia present.      Heart sounds: Normal heart sounds.   Pulmonary:      Breath sounds: Normal breath sounds. No wheezing.   Abdominal:      Comments: Midline incision with dressing in place; dressing dry, intact with old blood stain; G tube to gravity with moderate output   Musculoskeletal: Normal range of motion.   Skin:     General: Skin is warm and dry.      Coloration: Skin is not jaundiced or pale.      Comments: L peringuinal hematoma.  LLE pulses 2+.   Neurological:      General: No  focal deficit present.      Mental Status: She is alert.      Comments: AAOx2         Vents:  Vent Mode: Spont/T (09/08/20 1607)  Ventilator Initiated: Yes (09/04/20 1130)  Set Rate: 14 BPM (09/08/20 1142)  Vt Set: 330 mL (09/08/20 1142)  Pressure Support: 5 cmH20 (09/08/20 1607)  PEEP/CPAP: 5 cmH20 (09/08/20 1607)  Oxygen Concentration (%): 28 (09/15/20 0001)  Peak Airway Pressure: 21 cmH2O (09/08/20 1142)  Plateau Pressure: 14 cmH20 (09/08/20 0850)  Total Ve: 4.54 mL (09/08/20 1142)  Negative Inspiratory Force (cm H2O): -35 (09/08/20 1607)  F/VT Ratio<105 (RSBI): (!) 43.21 (09/08/20 1142)    Lines/Drains/Airways     Central Venous Catheter Line            Percutaneous Central Line Insertion/Assessment - Triple Lumen  09/04/20 0800 right internal jugular 10 days          Drain                 Gastrostomy/Enterostomy 09/04/20 LUQ decompression 11 days         Urethral Catheter 09/14/20 1330 less than 1 day                Significant Labs:    CBC/Anemia Profile:  Recent Labs   Lab 09/13/20  2053 09/14/20  0339 09/15/20  0314   WBC 24.38* 19.96* 10.79   HGB 8.0* 8.1* 7.0*   HCT 26.9* 26.2* 22.8*    190 172   MCV 95 94 95   RDW 17.5* 17.2* 17.6*        Chemistries:  Recent Labs   Lab 09/13/20 2053 09/14/20  0339 09/15/20  0314   * 146*  146*  --    K 3.7 3.6  3.6  --    * 110  110  --    CO2 24 24  24  --    BUN 59* 56*  56*  --    CREATININE 1.6* 1.5*  1.5*  --    CALCIUM 7.8* 7.8*  7.8*  --    ALBUMIN 1.7* 1.7*  1.7*  --    PROT 5.0* 5.1*  5.1*  --    BILITOT 2.9* 2.8*  2.8*  --    ALKPHOS 216* 255*  255*  --    ALT 72* 73*  73*  --    AST 80* 83*  83*  --    MG 2.2 2.2 2.1   PHOS 3.4 3.7 2.9       All pertinent labs within the past 24 hours have been reviewed.    Significant Imaging:  I have reviewed all pertinent imaging results/findings within the past 24 hours.

## 2020-09-15 NOTE — PLAN OF CARE
OSNF reviewing patient referral.        09/15/20 5313   Post-Acute Status   Post-Acute Authorization Placement   Post-Acute Placement Status Pending Post-Acute Clinical Review   Discharge Plan   Discharge Plan A Skilled Nursing Facility   Discharge Plan B Home Health     Tigist Moody LMSW   - Case Management

## 2020-09-15 NOTE — PROGRESS NOTES
[facility]  Nephrology  Progress Note    Patient Name: Elda Hernandez  MRN: [id number]  Admission Date: 9/3/2020  Hospital Length of Stay: 12 days  Attending Provider: Clark Rodriguez MD   Primary Care Physician: Jordana Woods MD  Principal Problem:Perforated abdominal viscus    Subjective:     HPI: 50 y.o. female that has a past medical history of Angiolipoma of kidney (10/1/2018), Arnold-Chiari malformation, Depression, Esophageal stricture, Essential tremor, Hypertension, Liver fibrosis, Osteoporosis, Recurrent urinary tract infection, Seizures, SIADH (syndrome of inappropriate ADH production), Squamous cell carcinoma (10/2014), and Von Gierke disease s/p liver transplant (2002, on tacro + MMF), HTN, and depression that presented to OSH due to worsening diffuse abdominal for 3 weeks, associated with nausea, vomiting and decreased PO intake. Abdominal CT scan showed gastric outlet narrowing, obstructive-related changes at pyloric-duodenal junction, and thickening of GE junction. EGD on 9/3 found with grade IV esophagitis with stricture. Pt transferred for GI evaluation and found with hypotension requiring vasopressors and lactic Acid of 3.9. Repeat CT showed decompressed abdomen and evidence of free air. Exploratory laparotomy performed and found with stomach perforation s/p washout, gastric perforation repair, and gastrostomy tube placement.  Nephrology consulted for JACQUE    Interval History: Patient seen and examined at bedside, spiked fever of 101 overnight. UOP At 820cc/24 hrs.     Review of patient's allergies indicates:   Allergen Reactions    Codeine Itching     Other reaction(s): Itching    Lipitor [atorvastatin] Other (See Comments)     Other reaction(s): Muscle pain  Muscle cranmps    Morphine Itching     Other reaction(s): nausea and vomiting     Zoloft [sertraline] Other (See Comments)     Tremors/muscle spasms     Current Facility-Administered Medications   Medication  Frequency    0.9%  NaCl infusion (for blood administration) Q24H PRN    acetaminophen tablet 650 mg Q6H    albuterol-ipratropium 2.5 mg-0.5 mg/3 mL nebulizer solution 3 mL Q4H PRN    calcium gluconate 1g in dextrose 5% 100mL (ready to mix system) PRN    calcium gluconate 2 g in dextrose 5 % 100 mL IVPB PRN    calcium gluconate 3 g in dextrose 5 % 100 mL IVPB PRN    dextrose 50% injection 12.5 g PRN    dextrose 50% injection 25 g PRN    fat emulsion 20% infusion 250 mL Daily    fluconazole (DIFLUCAN) IVPB 200 mg 100 mL Daily    glucagon (human recombinant) injection 1 mg PRN    glucose chewable tablet 16 g PRN    glucose chewable tablet 24 g PRN    haloperidol lactate injection 5 mg Q6H PRN    heparin (porcine) injection 5,000 Units Q8H    hydrALAZINE injection 10 mg Q8H PRN    insulin aspart U-100 pen 1-10 Units Q4H PRN    labetalol 20 mg/4 mL (5 mg/mL) IV syring Q6H PRN    levothyroxine injection 40 mcg Daily    magnesium sulfate 2g in water 50mL IVPB (premix) PRN    magnesium sulfate 2g in water 50mL IVPB (premix) PRN    melatonin tablet 6 mg Nightly PRN    ondansetron injection 4 mg Q8H PRN    pantoprazole injection 40 mg Daily    piperacillin-tazobactam 4.5 g in sodium chloride 0.9% 100 mL IVPB (ready to mix system) Q8H    potassium chloride 40 mEq in 100 mL IVPB (FOR CENTRAL LINE ADMINISTRATION ONLY) PRN    And    potassium chloride 20 mEq in 100 mL IVPB (FOR CENTRAL LINE ADMINISTRATION ONLY) PRN    And    potassium chloride 40 mEq in 100 mL IVPB (FOR CENTRAL LINE ADMINISTRATION ONLY) PRN    promethazine tablet 25 mg Q6H PRN    QUEtiapine tablet 100 mg BID    sodium chloride 0.9% flush 10 mL PRN    sodium chloride 0.9% flush 10 mL PRN    sodium phosphate 15 mmol in dextrose 5 % 250 mL IVPB PRN    sodium phosphate 20.01 mmol in dextrose 5 % 250 mL IVPB PRN    sodium phosphate 30 mmol in dextrose 5 % 250 mL IVPB PRN    tacrolimus capsule 0.5 mg Daily AM    TPN ADULT CENTRAL  LINE CUSTOM Continuous       Objective:     Vital Signs (Most Recent):  Temp: 100.2 °F (37.9 °C) (09/15/20 1330)  Pulse: 107 (09/15/20 1330)  Resp: (!) 30 (09/15/20 1330)  BP: (!) 118/56 (09/15/20 1330)  SpO2: 97 % (09/15/20 1330)  O2 Device (Oxygen Therapy): nasal cannula w/ humidification (09/15/20 1300) Vital Signs (24h Range):  Temp:  [98 °F (36.7 °C)-100.8 °F (38.2 °C)] 100.2 °F (37.9 °C)  Pulse:  [] 107  Resp:  [18-46] 30  SpO2:  [84 %-100 %] 97 %  BP: ()/(48-96) 118/56     Weight: 68 kg (149 lb 14.6 oz) (09/13/20 0500)  Body mass index is 30.28 kg/m².  Body surface area is 1.68 meters squared.    I/O last 3 completed shifts:  In: 2139 [I.V.:1639; NG/GT:500]  Out: 1320 [Urine:1245; Drains:75]    Physical Exam  Constitutional:       General: She is not in acute distress.  Cardiovascular:      Rate and Rhythm: Regular rhythm. Tachycardia present.   Pulmonary:      Effort: Pulmonary effort is normal. No respiratory distress.   Abdominal:      General: There is no distension.      Palpations: Abdomen is soft.      Tenderness: There is no abdominal tenderness.      Comments: Incision c/d/i  Thick drainage around G tube   Neurological:      General: No focal deficit present.      Mental Status: She is alert and oriented to person, place, and time.   Psychiatric:         Mood and Affect: Mood normal.         Behavior: Behavior normal.         Significant Labs:  CBC:   Recent Labs   Lab 09/15/20  1000   WBC 11.53   RBC 3.08*   HGB 8.8*   HCT 28.4*      MCV 92   MCH 28.6   MCHC 31.0*     CMP:   Recent Labs   Lab 09/15/20  0314   *   CALCIUM 7.6*   ALBUMIN 1.5*   PROT 4.9*   *   K 3.2*   CO2 23      BUN 46*   CREATININE 1.4   ALKPHOS 198*   ALT 53*   AST 46*   BILITOT 2.4*     Coagulation:   Recent Labs   Lab 09/15/20  0314   INR 1.4*   APTT 40.7*     All labs within the past 24 hours have been reviewed.     Significant Imaging:  Labs: Reviewed    Assessment/Plan:     JACQUE (acute  kidney injury)  JACQUE most likely secondary to hypoperfusion, likely ATN.  Pt also with non anion gap metabolic acidosis with respiratory compensation  Given Lasix 150mg IV x1 to poor UOP on 09/07    - No emergent need for dialysis today  -  cc/24 hrs, increased from day before   - lasix discontinued, last dose at 2 pm on 09/09  - Cr downtrend: 3.0-->3.2-->2.2-->1.7-->1.6-->1.5-->1.4  - TPN started by primary team  - Strict I/O and chart   - Renally dose all medications   - Avoid nephrotoxic medications, NSAIDs, IV contrast, ACE/ARB.  - Maintain MAP > 65  - Hb > 7 gm/dL        Hypokalemia  3.2 on latest BMP  Replenish aggresively    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  Management as per Hepatology   On Prograf, monitor daily levels         Thank you for your consult. I will follow-up with patient. Please contact us if you have any additional questions.    Al Shea MD  Nephrology  [facility]

## 2020-09-15 NOTE — PROGRESS NOTES
[facility]  General Surgery  Progress Note    Subjective:     History of Present Illness:  Ms. Hernandez is a 51yo F w/PMH of von Gierke disease s/p liver transplant (2002, on tacro + MMF, follows Dr. Nielsen), hx chronic rejection (bx 2015 with acute and chronic rejection s/p steroids), biliary stricture and ductopenic rejection, recently with cirrhosis on fibroscan (10/2019), HTN, and depression who presents as a transfer for further evaluation of gastric outlet obstruction and esophageal stricturing.  Patient decompensated requiring multiple pressors and intubation. CT scan showed free air. Prior to intubation patient reported severe abdominal pain.   states that patient has had epigastric pain, nausea, and vomiting for several days.     Post-Op Info:  Procedure(s) (LRB):  LAPAROTOMY, EXPLORATORY with abd closure (N/A)   9 Days Post-Op     Interval History:   NAEON.  Mental status stabilizing.  Tube study shows G tube in appropriate position     Medications:  Continuous Infusions:   TPN ADULT CENTRAL LINE CUSTOM 40 mL/hr at 09/15/20 0600     Scheduled Meds:   acetaminophen  650 mg Rectal Q6H    albuterol-ipratropium  3 mL Nebulization Q4H    fat emulsion 20%  250 mL Intravenous Daily    fluconazole (DIFLUCAN) IVPB  200 mg Intravenous Daily    heparin (porcine)  5,000 Units Subcutaneous Q8H    levothyroxine  40 mcg Intravenous Daily    olanzapine zydis  10 mg Oral QHS    pantoprazole  40 mg Intravenous Daily    piperacillin-tazobactam (ZOSYN) IVPB  4.5 g Intravenous Q8H    tacrolimus  0.5 mg Sublingual Daily AM     PRN Meds:sodium chloride, calcium gluconate IVPB, calcium gluconate IVPB, calcium gluconate IVPB, dextrose 50%, dextrose 50%, glucagon (human recombinant), glucose, glucose, haloperidol lactate, hydrALAZINE, insulin aspart U-100, labetaloL, magnesium sulfate IVPB, magnesium sulfate IVPB, melatonin, ondansetron, potassium chloride in water **AND** potassium chloride in  water **AND** potassium chloride in water, promethazine, sodium chloride 0.9%, sodium chloride 0.9%, sodium phosphate IVPB, sodium phosphate IVPB, sodium phosphate IVPB     Review of patient's allergies indicates:   Allergen Reactions    Codeine Itching     Other reaction(s): Itching    Lipitor [atorvastatin] Other (See Comments)     Other reaction(s): Muscle pain  Muscle cranmps    Morphine Itching     Other reaction(s): nausea and vomiting     Zoloft [sertraline] Other (See Comments)     Tremors/muscle spasms     Objective:     Vital Signs (Most Recent):  Temp: 98.9 °F (37.2 °C) (09/15/20 0745)  Pulse: 109 (09/15/20 0745)  Resp: (!) 26 (09/15/20 0745)  BP: (!) 126/59 (09/15/20 0745)  SpO2: 96 % (09/15/20 0745) Vital Signs (24h Range):  Temp:  [98 °F (36.7 °C)-99.8 °F (37.7 °C)] 98.9 °F (37.2 °C)  Pulse:  [] 109  Resp:  [17-44] 26  SpO2:  [84 %-100 %] 96 %  BP: (104-148)/(48-96) 126/59     Weight: 68 kg (149 lb 14.6 oz)  Body mass index is 30.28 kg/m².    Intake/Output - Last 3 Shifts       09/13 0700 - 09/14 0659 09/14 0700 - 09/15 0659 09/15 0700 - 09/16 0659    I.V. (mL/kg) 1770 (26) 919 (13.5)     Blood   322    NG/      Total Intake(mL/kg) 2520 (37.1) 919 (13.5) 322 (4.7)    Urine (mL/kg/hr) 425 (0.3) 820 (0.5)     Drains 160 75     Stool 700 0     Total Output 1285 895     Net +1235 +24 +322           Urine Occurrence 1 x 1 x     Stool Occurrence  1 x           Physical Exam  Constitutional:       General: She is not in acute distress.  Cardiovascular:      Rate and Rhythm: Regular rhythm. Tachycardia present.   Pulmonary:      Effort: Pulmonary effort is normal. No respiratory distress.   Abdominal:      General: There is no distension.      Palpations: Abdomen is soft.      Tenderness: There is no abdominal tenderness.      Comments: Incision c/d/i  Thick drainage around G tube   Neurological:      General: No focal deficit present.      Mental Status: She is alert and oriented to person,  place, and time.   Psychiatric:         Mood and Affect: Mood normal.         Behavior: Behavior normal.         Significant Labs:  CBC:   Recent Labs   Lab 09/15/20  0314   WBC 10.79   RBC 2.41*   HGB 7.0*   HCT 22.8*      MCV 95   MCH 29.0   MCHC 30.7*     CMP:   Recent Labs   Lab 09/15/20  0314   *   CALCIUM 7.6*   ALBUMIN 1.5*   PROT 4.9*   *   K 3.2*   CO2 23      BUN 46*   CREATININE 1.4   ALKPHOS 198*   ALT 53*   AST 46*   BILITOT 2.4*       Significant Diagnostics:  I have reviewed all pertinent imaging results/findings within the past 24 hours.    Assessment/Plan:     * Perforated abdominal viscus  50 y.o. female w/ free air on CT scan. S/p emergent ex lap on 9/4 w/ findings of gastric perforation, primary repair. Now 9 Days Post-Op from abdominal closure on 9/6, skin stapled closed, KERRY drains removed.     Continue SICU care  Tube in good position.  Should be able to use  Zosyn and diflucan for suspected intraabodminal source, WBC improving          Jhony Chappell MD  General Surgery  [facility]

## 2020-09-15 NOTE — PLAN OF CARE
SW sent referral to [facility]. Additional referrals to be sent once SW speaks with patient regarding discharge plan of SNF.     Tigist Moody LMSW   - Case Management

## 2020-09-15 NOTE — SUBJECTIVE & OBJECTIVE
Interval History:   NAEON.  Mental status stabilizing.  Tube study shows G tube in appropriate position     Medications:  Continuous Infusions:   TPN ADULT CENTRAL LINE CUSTOM 40 mL/hr at 09/15/20 0600     Scheduled Meds:   acetaminophen  650 mg Rectal Q6H    albuterol-ipratropium  3 mL Nebulization Q4H    fat emulsion 20%  250 mL Intravenous Daily    fluconazole (DIFLUCAN) IVPB  200 mg Intravenous Daily    heparin (porcine)  5,000 Units Subcutaneous Q8H    levothyroxine  40 mcg Intravenous Daily    olanzapine zydis  10 mg Oral QHS    pantoprazole  40 mg Intravenous Daily    piperacillin-tazobactam (ZOSYN) IVPB  4.5 g Intravenous Q8H    tacrolimus  0.5 mg Sublingual Daily AM     PRN Meds:sodium chloride, calcium gluconate IVPB, calcium gluconate IVPB, calcium gluconate IVPB, dextrose 50%, dextrose 50%, glucagon (human recombinant), glucose, glucose, haloperidol lactate, hydrALAZINE, insulin aspart U-100, labetaloL, magnesium sulfate IVPB, magnesium sulfate IVPB, melatonin, ondansetron, potassium chloride in water **AND** potassium chloride in water **AND** potassium chloride in water, promethazine, sodium chloride 0.9%, sodium chloride 0.9%, sodium phosphate IVPB, sodium phosphate IVPB, sodium phosphate IVPB     Review of patient's allergies indicates:   Allergen Reactions    Codeine Itching     Other reaction(s): Itching    Lipitor [atorvastatin] Other (See Comments)     Other reaction(s): Muscle pain  Muscle cranmps    Morphine Itching     Other reaction(s): nausea and vomiting     Zoloft [sertraline] Other (See Comments)     Tremors/muscle spasms     Objective:     Vital Signs (Most Recent):  Temp: 98.9 °F (37.2 °C) (09/15/20 0745)  Pulse: 109 (09/15/20 0745)  Resp: (!) 26 (09/15/20 0745)  BP: (!) 126/59 (09/15/20 0745)  SpO2: 96 % (09/15/20 0745) Vital Signs (24h Range):  Temp:  [98 °F (36.7 °C)-99.8 °F (37.7 °C)] 98.9 °F (37.2 °C)  Pulse:  [] 109  Resp:  [17-44] 26  SpO2:  [84 %-100 %] 96  %  BP: (104-148)/(48-96) 126/59     Weight: 68 kg (149 lb 14.6 oz)  Body mass index is 30.28 kg/m².    Intake/Output - Last 3 Shifts       09/13 0700 - 09/14 0659 09/14 0700 - 09/15 0659 09/15 0700 - 09/16 0659    I.V. (mL/kg) 1770 (26) 919 (13.5)     Blood   322    NG/      Total Intake(mL/kg) 2520 (37.1) 919 (13.5) 322 (4.7)    Urine (mL/kg/hr) 425 (0.3) 820 (0.5)     Drains 160 75     Stool 700 0     Total Output 1285 895     Net +1235 +24 +322           Urine Occurrence 1 x 1 x     Stool Occurrence  1 x           Physical Exam  Constitutional:       General: She is not in acute distress.  Cardiovascular:      Rate and Rhythm: Regular rhythm. Tachycardia present.   Pulmonary:      Effort: Pulmonary effort is normal. No respiratory distress.   Abdominal:      General: There is no distension.      Palpations: Abdomen is soft.      Tenderness: There is no abdominal tenderness.      Comments: Incision c/d/i  Thick drainage around G tube   Neurological:      General: No focal deficit present.      Mental Status: She is alert and oriented to person, place, and time.   Psychiatric:         Mood and Affect: Mood normal.         Behavior: Behavior normal.         Significant Labs:  CBC:   Recent Labs   Lab 09/15/20  0314   WBC 10.79   RBC 2.41*   HGB 7.0*   HCT 22.8*      MCV 95   MCH 29.0   MCHC 30.7*     CMP:   Recent Labs   Lab 09/15/20  0314   *   CALCIUM 7.6*   ALBUMIN 1.5*   PROT 4.9*   *   K 3.2*   CO2 23      BUN 46*   CREATININE 1.4   ALKPHOS 198*   ALT 53*   AST 46*   BILITOT 2.4*       Significant Diagnostics:  I have reviewed all pertinent imaging results/findings within the past 24 hours.

## 2020-09-15 NOTE — TREATMENT PLAN
Hepatology Treatment Plan    Elda Hernandez is a 50 y.o. female admitted to hospital 9/3/2020 (Hospital Day: 13) due to Perforated abdominal viscus. Hepatology following for immunosuppression management.    Lab Results   Component Value Date    TACROLIMUS 4.3 (L) 09/15/2020    TACROLIMUS 7.0 09/14/2020    TACROLIMUS 7.8 09/13/2020       Lab Results   Component Value Date    CREATININE 1.4 09/15/2020    CREATININE 1.5 (H) 09/14/2020    CREATININE 1.5 (H) 09/14/2020       Lab Results   Component Value Date    ALT 53 (H) 09/15/2020    AST 46 (H) 09/15/2020     (H) 04/07/2018    ALKPHOS 198 (H) 09/15/2020    BILITOT 2.4 (H) 09/15/2020    WBC 11.53 09/15/2020    HGB 8.8 (L) 09/15/2020     09/15/2020       Kidney function is improving  Liver enzymes are improving    Plan  - Continue immunosuppression regimen without changes    Immunosuppression Regimen:  Tacrolimus: 0.5mgmg daily sublingual    Please notify hepatology on day of discharge to discuss discharge medications and follow up.     Please obtain daily CBC, BMP, LFT, INR, immunosuppressant trough level    Thank you for involving us in the care of Elda Hernandez. Please call with any additional concerns or questions.    John Mustafa MD  Gastroenterology Fellow

## 2020-09-15 NOTE — ASSESSMENT & PLAN NOTE
50 y.o. female w/ free air on CT scan. S/p emergent ex lap on 9/4 w/ findings of gastric perforation, primary repair. Now 9 Days Post-Op from abdominal closure on 9/6, skin stapled closed, KERRY drains removed.     Continue SICU care  Tube in good position.  Should be able to use  Zosyn and diflucan for suspected intraabodminal source, WBC improving

## 2020-09-15 NOTE — PLAN OF CARE
SICU PLAN OF CARE NOTE     Dx: Perforated abdominal viscus     Shift Events:   VSS at this time. 2 L nasal cannula. Normal Sinus rhythm to Sinus Tachycardia  HR. Febrile during shift. Scheduled tylenol given. MAP >65.     Tube feeds restarted @ 10 cc/hr. Ok to use G tube for medicationss per Dr. Decker. G Tube leaking around the site mucous/yellow/tan drainage (prior to tube feeds administration). Dr. Decker aware.     Patient intermittently oriented to place and time, but she always is oriented to self. MDs aware. VBG ordered and obtained. Within normal limits.     Plan of care reviewed thoroughly with patient and . All questions and concerns acknowledged and answered. Will continue to monitor patient closely.      Goals of Care: Monitor mental status     Neuro: Confused; Oriented to self; Intermittently oriented to place and time      Respiratory: Nasal Cannula 2 L     Diet: NPO/ Tube Feeds      Gtts: None     Urine Output: Urinary Catheter 670 cc/shift      Drains: G-tube/J-tube, total output 150 cc /  shift    Skin: All skin monitored for breakdown. Mid-abdomen incision GABY with staples intact. G tube dressing with yellow mucous like drainage noted. MD made aware. Dressing changed and cleansed. Right wrist foam dressing changed. Bruising noted around site. Heel and sacral foams in place. No breakdown noted underneath. All pressure points elevated and protected. Immerse bed plugged into wall. No new injuries noted.

## 2020-09-15 NOTE — PLAN OF CARE
POC reviewed with pt and . VSS. Afebrile, follows commands and moves all extremities. Pt disoriented to place and situation, pt does have slurred speech intermittently. 2 doses of haldol given during shift, pt slept much better. Pt is very agitated and uncooperative, unable to give all meds due to pt refusal. MAP > 60, SBP < 190, sinus tachycardia, SpO2 > 90% on 2L NC. TPN and lipids started. UOP trended, G tube is drained to gravity.1 BM during shift. MD aware of hemoglobin drop of 8.1 to 7, plan to give 1U PRBC. See flowsheet for additional details, will continue to monitor.      Skin: Bed plugged in, mattress inflated. Heel foams intact, SCDs on. Pt turned q2. Pressure points protected. Pt constantly pulling on restraints, skin assessed and no skin breakdown noted. See flowsheet for additional details on skin assessment.

## 2020-09-15 NOTE — ASSESSMENT & PLAN NOTE
JACQUE most likely secondary to hypoperfusion, likely ATN.  Pt also with non anion gap metabolic acidosis with respiratory compensation  Given Lasix 150mg IV x1 to poor UOP on 09/07    - No emergent need for dialysis today  -  cc/24 hrs, increased from day before   - lasix discontinued, last dose at 2 pm on 09/09  - Cr downtrend: 3.0-->3.2-->2.2-->1.7-->1.6-->1.5-->1.4  - TPN started by primary team  - Strict I/O and chart   - Renally dose all medications   - Avoid nephrotoxic medications, NSAIDs, IV contrast, ACE/ARB.  - Maintain MAP > 65  - Hb > 7 gm/dL

## 2020-09-16 LAB
ALBUMIN SERPL BCP-MCNC: 1.4 G/DL (ref 3.5–5.2)
ALP SERPL-CCNC: 179 U/L (ref 55–135)
ALT SERPL W/O P-5'-P-CCNC: 46 U/L (ref 10–44)
ANION GAP SERPL CALC-SCNC: 12 MMOL/L (ref 8–16)
ANISOCYTOSIS BLD QL SMEAR: SLIGHT
APTT BLDCRRT: 38.7 SEC (ref 21–32)
AST SERPL-CCNC: 43 U/L (ref 10–40)
BACTERIA BLD CULT: NORMAL
BASO STIPL BLD QL SMEAR: ABNORMAL
BASOPHILS # BLD AUTO: 0.02 K/UL (ref 0–0.2)
BASOPHILS NFR BLD: 0.2 % (ref 0–1.9)
BILIRUB SERPL-MCNC: 2.5 MG/DL (ref 0.1–1)
BUN SERPL-MCNC: 48 MG/DL (ref 6–20)
CALCIUM SERPL-MCNC: 7.7 MG/DL (ref 8.7–10.5)
CHLORIDE SERPL-SCNC: 115 MMOL/L (ref 95–110)
CO2 SERPL-SCNC: 22 MMOL/L (ref 23–29)
CREAT SERPL-MCNC: 1.3 MG/DL (ref 0.5–1.4)
DACRYOCYTES BLD QL SMEAR: ABNORMAL
DIFFERENTIAL METHOD: ABNORMAL
EOSINOPHIL # BLD AUTO: 0.1 K/UL (ref 0–0.5)
EOSINOPHIL NFR BLD: 1.5 % (ref 0–8)
ERYTHROCYTE [DISTWIDTH] IN BLOOD BY AUTOMATED COUNT: 17.7 % (ref 11.5–14.5)
EST. GFR  (AFRICAN AMERICAN): 54.5 ML/MIN/1.73 M^2
EST. GFR  (NON AFRICAN AMERICAN): 47.3 ML/MIN/1.73 M^2
GLUCOSE SERPL-MCNC: 112 MG/DL (ref 70–110)
HCT VFR BLD AUTO: 26.8 % (ref 37–48.5)
HGB BLD-MCNC: 8.3 G/DL (ref 12–16)
HYPOCHROMIA BLD QL SMEAR: ABNORMAL
IMM GRANULOCYTES # BLD AUTO: 0.21 K/UL (ref 0–0.04)
IMM GRANULOCYTES NFR BLD AUTO: 2.4 % (ref 0–0.5)
INR PPP: 1.1 (ref 0.8–1.2)
LYMPHOCYTES # BLD AUTO: 0.7 K/UL (ref 1–4.8)
LYMPHOCYTES NFR BLD: 8.2 % (ref 18–48)
MAGNESIUM SERPL-MCNC: 2 MG/DL (ref 1.6–2.6)
MCH RBC QN AUTO: 28.8 PG (ref 27–31)
MCHC RBC AUTO-ENTMCNC: 31 G/DL (ref 32–36)
MCV RBC AUTO: 93 FL (ref 82–98)
MONOCYTES # BLD AUTO: 1 K/UL (ref 0.3–1)
MONOCYTES NFR BLD: 11.5 % (ref 4–15)
NEUTROPHILS # BLD AUTO: 6.7 K/UL (ref 1.8–7.7)
NEUTROPHILS NFR BLD: 76.2 % (ref 38–73)
NRBC BLD-RTO: 1 /100 WBC
OVALOCYTES BLD QL SMEAR: ABNORMAL
PHOSPHATE SERPL-MCNC: 3.5 MG/DL (ref 2.7–4.5)
PLATELET # BLD AUTO: 166 K/UL (ref 150–350)
PLATELET BLD QL SMEAR: ABNORMAL
PMV BLD AUTO: 11.1 FL (ref 9.2–12.9)
POCT GLUCOSE: 112 MG/DL (ref 70–110)
POCT GLUCOSE: 117 MG/DL (ref 70–110)
POCT GLUCOSE: 122 MG/DL (ref 70–110)
POCT GLUCOSE: 128 MG/DL (ref 70–110)
POCT GLUCOSE: 129 MG/DL (ref 70–110)
POIKILOCYTOSIS BLD QL SMEAR: SLIGHT
POLYCHROMASIA BLD QL SMEAR: ABNORMAL
POTASSIUM SERPL-SCNC: 3.4 MMOL/L (ref 3.5–5.1)
PROT SERPL-MCNC: 4.8 G/DL (ref 6–8.4)
PROTHROMBIN TIME: 12.2 SEC (ref 9–12.5)
RBC # BLD AUTO: 2.88 M/UL (ref 4–5.4)
SODIUM SERPL-SCNC: 149 MMOL/L (ref 136–145)
TACROLIMUS BLD-MCNC: 4.3 NG/ML (ref 5–15)
TB INDURATION 48 - 72 HR READ: 0 MM
WBC # BLD AUTO: 8.82 K/UL (ref 3.9–12.7)

## 2020-09-16 PROCEDURE — 63600175 PHARM REV CODE 636 W HCPCS: Performed by: STUDENT IN AN ORGANIZED HEALTH CARE EDUCATION/TRAINING PROGRAM

## 2020-09-16 PROCEDURE — 25000003 PHARM REV CODE 250: Performed by: SURGERY

## 2020-09-16 PROCEDURE — 25000003 PHARM REV CODE 250: Performed by: STUDENT IN AN ORGANIZED HEALTH CARE EDUCATION/TRAINING PROGRAM

## 2020-09-16 PROCEDURE — 85025 COMPLETE CBC W/AUTO DIFF WBC: CPT

## 2020-09-16 PROCEDURE — 20000000 HC ICU ROOM

## 2020-09-16 PROCEDURE — 85730 THROMBOPLASTIN TIME PARTIAL: CPT

## 2020-09-16 PROCEDURE — 80053 COMPREHEN METABOLIC PANEL: CPT

## 2020-09-16 PROCEDURE — 83735 ASSAY OF MAGNESIUM: CPT

## 2020-09-16 PROCEDURE — C9113 INJ PANTOPRAZOLE SODIUM, VIA: HCPCS | Performed by: STUDENT IN AN ORGANIZED HEALTH CARE EDUCATION/TRAINING PROGRAM

## 2020-09-16 PROCEDURE — 80197 ASSAY OF TACROLIMUS: CPT

## 2020-09-16 PROCEDURE — 85610 PROTHROMBIN TIME: CPT

## 2020-09-16 PROCEDURE — 99291 CRITICAL CARE FIRST HOUR: CPT | Mod: 24,GC,, | Performed by: SURGERY

## 2020-09-16 PROCEDURE — A4217 STERILE WATER/SALINE, 500 ML: HCPCS | Performed by: SURGERY

## 2020-09-16 PROCEDURE — 99900035 HC TECH TIME PER 15 MIN (STAT)

## 2020-09-16 PROCEDURE — 63600175 PHARM REV CODE 636 W HCPCS: Performed by: SURGERY

## 2020-09-16 PROCEDURE — 84100 ASSAY OF PHOSPHORUS: CPT

## 2020-09-16 PROCEDURE — 99291 PR CRITICAL CARE, E/M 30-74 MINUTES: ICD-10-PCS | Mod: 24,GC,, | Performed by: SURGERY

## 2020-09-16 PROCEDURE — 94761 N-INVAS EAR/PLS OXIMETRY MLT: CPT

## 2020-09-16 RX ORDER — PANTOPRAZOLE SODIUM 40 MG/1
40 FOR SUSPENSION ORAL DAILY
Status: DISCONTINUED | OUTPATIENT
Start: 2020-09-17 | End: 2020-09-18

## 2020-09-16 RX ORDER — ACETAMINOPHEN 500 MG
500 TABLET ORAL EVERY 6 HOURS
Status: DISCONTINUED | OUTPATIENT
Start: 2020-09-16 | End: 2020-09-18

## 2020-09-16 RX ORDER — LEVOTHYROXINE SODIUM 75 UG/1
75 TABLET ORAL DAILY
Status: DISCONTINUED | OUTPATIENT
Start: 2020-09-17 | End: 2020-09-18

## 2020-09-16 RX ADMIN — PIPERACILLIN SODIUM AND TAZOBACTAM SODIUM 4.5 G: 4; .5 INJECTION, POWDER, LYOPHILIZED, FOR SOLUTION INTRAVENOUS at 11:09

## 2020-09-16 RX ADMIN — ACETAMINOPHEN 500 MG: 325 TABLET ORAL at 05:09

## 2020-09-16 RX ADMIN — POTASSIUM CHLORIDE 60 MEQ: 14.9 INJECTION, SOLUTION INTRAVENOUS at 05:09

## 2020-09-16 RX ADMIN — ACETAMINOPHEN 650 MG: 325 TABLET ORAL at 12:09

## 2020-09-16 RX ADMIN — ACETAMINOPHEN 650 MG: 325 TABLET ORAL at 05:09

## 2020-09-16 RX ADMIN — ACETAMINOPHEN 500 MG: 325 TABLET ORAL at 11:09

## 2020-09-16 RX ADMIN — QUETIAPINE FUMARATE 100 MG: 100 TABLET ORAL at 08:09

## 2020-09-16 RX ADMIN — ONDANSETRON 4 MG: 2 INJECTION INTRAMUSCULAR; INTRAVENOUS at 06:09

## 2020-09-16 RX ADMIN — PIPERACILLIN SODIUM AND TAZOBACTAM SODIUM 4.5 G: 4; .5 INJECTION, POWDER, LYOPHILIZED, FOR SOLUTION INTRAVENOUS at 12:09

## 2020-09-16 RX ADMIN — PANTOPRAZOLE SODIUM 40 MG: 40 INJECTION, POWDER, LYOPHILIZED, FOR SOLUTION INTRAVENOUS at 08:09

## 2020-09-16 RX ADMIN — MELATONIN TAB 3 MG 6 MG: 3 TAB at 07:09

## 2020-09-16 RX ADMIN — HEPARIN SODIUM 5000 UNITS: 5000 INJECTION INTRAVENOUS; SUBCUTANEOUS at 01:09

## 2020-09-16 RX ADMIN — PIPERACILLIN SODIUM AND TAZOBACTAM SODIUM 4.5 G: 4; .5 INJECTION, POWDER, LYOPHILIZED, FOR SOLUTION INTRAVENOUS at 08:09

## 2020-09-16 RX ADMIN — FLUCONAZOLE 200 MG: 2 INJECTION, SOLUTION INTRAVENOUS at 08:09

## 2020-09-16 RX ADMIN — TACROLIMUS 0.5 MG: 1 CAPSULE, GELATIN COATED ORAL at 10:09

## 2020-09-16 RX ADMIN — MAGNESIUM SULFATE HEPTAHYDRATE: 500 INJECTION, SOLUTION INTRAMUSCULAR; INTRAVENOUS at 10:09

## 2020-09-16 RX ADMIN — PIPERACILLIN SODIUM AND TAZOBACTAM SODIUM 4.5 G: 4; .5 INJECTION, POWDER, LYOPHILIZED, FOR SOLUTION INTRAVENOUS at 03:09

## 2020-09-16 RX ADMIN — HEPARIN SODIUM 5000 UNITS: 5000 INJECTION INTRAVENOUS; SUBCUTANEOUS at 10:09

## 2020-09-16 RX ADMIN — HEPARIN SODIUM 5000 UNITS: 5000 INJECTION INTRAVENOUS; SUBCUTANEOUS at 06:09

## 2020-09-16 RX ADMIN — TACROLIMUS 0.5 MG: 1 CAPSULE, GELATIN COATED ORAL at 11:09

## 2020-09-16 RX ADMIN — LEVOTHYROXINE SODIUM ANHYDROUS 40 MCG: 100 INJECTION, POWDER, LYOPHILIZED, FOR SOLUTION INTRAVENOUS at 08:09

## 2020-09-16 NOTE — PLAN OF CARE
SICU PLAN OF CARE NOTE     Dx: Perforated abdominal viscus     Shift Events:   VSS at this time. Room air. Normal Sinus rhythm to Sinus Tachycardia  HR. Febrile during shift. Scheduled tylenol given. MAP >65.      Tube feeds increased to 35 cc/hr. G Tube leaking around the site mucous/yellow/tan drainage. 200 cc water boluses q 6 hours started. Minimal residuals noted.      Patient AAO x4 at the beginning of shift. Patient became more confused during day. She is still appropriate and not requiring restraints. Patient mutters to self throughout day. She is intermittently oriented to time. MDs aware.     Plan of care reviewed thoroughly with patient and . All questions and concerns acknowledged and answered. Will continue to monitor patient closely.      Goals of Care: Monitor mental status     Neuro: AAO x4, intermittently oriented to time      Respiratory: Room Air     Diet: NPO/ Tube Feeds      Gtts: TPN     Urine Output: Urinary Catheter 820 cc/shift      Skin: All skin monitored for breakdown. Mid-abdomen incision GABY with staples intact. G tube dressing with yellow mucous like drainage noted. MD made aware. Dressing changed and cleansed. Right wrist foam dressing changed. Bruising noted around site. Heel and sacral foams in place. No breakdown noted underneath. All pressure points elevated and protected. Immerse bed plugged into wall. No new injuries noted.

## 2020-09-16 NOTE — SUBJECTIVE & OBJECTIVE
Interval History: Patient seen and examined at bedside, Cr trending down.     Review of patient's allergies indicates:   Allergen Reactions    Codeine Itching     Other reaction(s): Itching    Lipitor [atorvastatin] Other (See Comments)     Other reaction(s): Muscle pain  Muscle cranmps    Morphine Itching     Other reaction(s): nausea and vomiting     Zoloft [sertraline] Other (See Comments)     Tremors/muscle spasms     Current Facility-Administered Medications   Medication Frequency    0.9%  NaCl infusion (for blood administration) Q24H PRN    acetaminophen tablet 500 mg Q6H    albuterol-ipratropium 2.5 mg-0.5 mg/3 mL nebulizer solution 3 mL Q4H PRN    calcium gluconate 1g in dextrose 5% 100mL (ready to mix system) PRN    calcium gluconate 2 g in dextrose 5 % 100 mL IVPB PRN    calcium gluconate 3 g in dextrose 5 % 100 mL IVPB PRN    dextrose 50% injection 12.5 g PRN    dextrose 50% injection 25 g PRN    fluconazole (DIFLUCAN) IVPB 200 mg 100 mL Daily    glucagon (human recombinant) injection 1 mg PRN    glucose chewable tablet 16 g PRN    glucose chewable tablet 24 g PRN    haloperidol lactate injection 5 mg Q6H PRN    heparin (porcine) injection 5,000 Units Q8H    hydrALAZINE injection 10 mg Q8H PRN    insulin aspart U-100 pen 1-10 Units Q4H PRN    labetalol 20 mg/4 mL (5 mg/mL) IV syring Q6H PRN    [START ON 9/17/2020] levothyroxine tablet 75 mcg Daily    magnesium sulfate 2g in water 50mL IVPB (premix) PRN    magnesium sulfate 2g in water 50mL IVPB (premix) PRN    melatonin tablet 6 mg Nightly PRN    ondansetron injection 4 mg Q8H PRN    [START ON 9/17/2020] pantoprazole suspension 40 mg Daily    piperacillin-tazobactam 4.5 g in sodium chloride 0.9% 100 mL IVPB (ready to mix system) Q8H    potassium chloride 40 mEq in 100 mL IVPB (FOR CENTRAL LINE ADMINISTRATION ONLY) PRN    And    potassium chloride 20 mEq in 100 mL IVPB (FOR CENTRAL LINE ADMINISTRATION ONLY) PRN    And     potassium chloride 40 mEq in 100 mL IVPB (FOR CENTRAL LINE ADMINISTRATION ONLY) PRN    promethazine tablet 25 mg Q6H PRN    QUEtiapine tablet 100 mg BID    sodium chloride 0.9% flush 10 mL PRN    sodium chloride 0.9% flush 10 mL PRN    sodium phosphate 15 mmol in dextrose 5 % 250 mL IVPB PRN    sodium phosphate 20.01 mmol in dextrose 5 % 250 mL IVPB PRN    sodium phosphate 30 mmol in dextrose 5 % 250 mL IVPB PRN    [START ON 9/17/2020] tacrolimus (PROGRAF) 1 mg/mL oral syringe Daily AM    TPN ADULT CENTRAL LINE CUSTOM Continuous    TPN ADULT CENTRAL LINE CUSTOM Continuous       Objective:     Vital Signs (Most Recent):  Temp: 100.2 °F (37.9 °C) (09/16/20 1330)  Pulse: 99 (09/16/20 1330)  Resp: (!) 22 (09/16/20 1330)  BP: (!) 110/57 (09/16/20 1330)  SpO2: 96 % (09/16/20 1330)  O2 Device (Oxygen Therapy): room air (09/16/20 1300) Vital Signs (24h Range):  Temp:  [99.9 °F (37.7 °C)-100.4 °F (38 °C)] 100.2 °F (37.9 °C)  Pulse:  [] 99  Resp:  [16-41] 22  SpO2:  [94 %-100 %] 96 %  BP: ()/(50-77) 110/57     Weight: 68 kg (149 lb 14.6 oz) (09/13/20 0500)  Body mass index is 30.28 kg/m².  Body surface area is 1.68 meters squared.    I/O last 3 completed shifts:  In: 3417 [I.V.:1166; Blood:322; NG/GT:220]  Out: 1725 [Urine:1575; Drains:150]    Physical Exam  Vitals signs and nursing note reviewed.   Constitutional:       General: She is not in acute distress.  HENT:      Head: Normocephalic and atraumatic.      Nose: Nose normal.      Mouth/Throat:      Mouth: Mucous membranes are moist.   Eyes:      Extraocular Movements: Extraocular movements intact.   Neck:      Musculoskeletal: Normal range of motion.      Comments: RIJ central line  Cardiovascular:      Rate and Rhythm: Regular rhythm. Tachycardia present.      Pulses: Normal pulses.      Heart sounds: Normal heart sounds.   Pulmonary:      Breath sounds: Normal breath sounds. No wheezing.   Abdominal:      Comments: G tube with some mucoid drainage  around entry site.   Musculoskeletal: Normal range of motion.   Skin:     General: Skin is warm and dry.      Coloration: Skin is not jaundiced or pale.      Comments: L peringuinal hematoma.   Neurological:      General: No focal deficit present.      Mental Status: She is alert.      Comments: AAOx2         Significant Labs:  CBC:   Recent Labs   Lab 09/16/20 0300   WBC 8.82   RBC 2.88*   HGB 8.3*   HCT 26.8*      MCV 93   MCH 28.8   MCHC 31.0*     CMP:   Recent Labs   Lab 09/16/20 0300   *   CALCIUM 7.7*   ALBUMIN 1.4*   PROT 4.8*   *   K 3.4*   CO2 22*   *   BUN 48*   CREATININE 1.3   ALKPHOS 179*   ALT 46*   AST 43*   BILITOT 2.5*     Coagulation:   Recent Labs   Lab 09/16/20 0300   INR 1.1   APTT 38.7*     All labs within the past 24 hours have been reviewed.     Significant Imaging:  Labs: Reviewed

## 2020-09-16 NOTE — PHYSICIAN QUERY
PT Name: Elda Hernandez  MR #: [id number]     Documentation Clarification      CDS/: Justyna Hayes RN, CDS               Contact information: [email or web address]    This form is a permanent document in the medical record.     Query Date: September 16, 2020    By submitting this query, we are merely seeking further clarification of documentation. Please utilize your independent clinical judgment when addressing the question(s) below.    The Medical Record reflects the following:    Supporting Clinical Findings Location in Medical Record   Procedure - exploratory laparotomy vigorous irrigation of abdominal contamination repair of gastric perforation omental patch in gastrostomy tube placement finally a wound VAC (ABThera) was applied    Procedure - exploratory laparotomy washout and closure of abdomen    Patient remains critically ill.  Intubated, sedated.  Hemodynamics very sensitive to sedation.  Has been bradycardic today, will hold off on transitioning to precedex.  Will continue to wean sedation as tolerated.  Transition to SBT.  Has required some pressors with increased sedation.  Remains NPO currently.  Ideally will be able to initiate enteral meds/nutrition in the next 24-48 hours s/p repair of perforation    Will need tube study prior to consider feeding, given questionable history of gastric outlet obstruction    Can start trickle feeds through the G tube    Increased abdominal distention/leukocytosis, CT abdomen and pelvis today.  Images reviewed, significant dilation of stomach, small bowel.  NPO, NGT to gravity.     Appears to be an ileus    Belly pain improved. Minimal g tube output. BMS placed for loose stools  Op Note 9/4         Op Note 9/6     Critical Care PN 9/7               Surgery PN 9/8       Surgery PN 9/9     Critical Care PN 9/11        Critical Care PN 9/11    Surgery PN 9/13                                                                                 Provider, please further  specify the diagnosis of _ileus_    [ x  ] Ileus is not a complication of surgery, please further specify type         [ x ] Adynamic ileus         [   ] Paralytic ileus         [   ] Other, please specify __________        [   ] Clinically undetermined type of ileus    [   ]  Ileus is a complication of surgery, please further specify type         [   ] Adynamic ileus         [   ] Paralytic ileus         [   ] Other, please specify __________        [   ] Clinically undetermined type of ileus      [   ]  Ileus ruled out      [   ] Other (please specify): ____________     [  ] Clinically undetermined                                                                                                           Present on admission (POA) status:   [   ] Yes (Y)                          [  ] Clinically Undetermined (W)  [   ] No (N)                            [   ] Documentation insufficient to determine if condition is POA (U)

## 2020-09-16 NOTE — SUBJECTIVE & OBJECTIVE
Interval History/Significant Events: NAEO.  Pt resting comfortably during interview.  Oriented to person and place.  Uncertain of year.    Follow-up For: Procedure(s) (LRB):  LAPAROTOMY, EXPLORATORY with abd closure (N/A)    Post-Operative Day: 10 Days Post-Op    Objective:     Vital Signs (Most Recent):  Temp: 100 °F (37.8 °C) (09/16/20 0745)  Pulse: 102 (09/16/20 0745)  Resp: (!) 24 (09/16/20 0745)  BP: (!) 120/58 (09/16/20 0730)  SpO2: 99 % (09/16/20 0745) Vital Signs (24h Range):  Temp:  [98.4 °F (36.9 °C)-100.8 °F (38.2 °C)] 100 °F (37.8 °C)  Pulse:  [] 102  Resp:  [16-46] 24  SpO2:  [91 %-100 %] 99 %  BP: ()/(50-82) 120/58     Weight: 68 kg (149 lb 14.6 oz)  Body mass index is 30.28 kg/m².      Intake/Output Summary (Last 24 hours) at 9/16/2020 0749  Last data filed at 9/16/2020 0705  Gross per 24 hour   Intake 3105 ml   Output 1315 ml   Net 1790 ml       Physical Exam  Vitals signs and nursing note reviewed.   Constitutional:       General: She is not in acute distress.  HENT:      Head: Normocephalic and atraumatic.      Nose: Nose normal.      Mouth/Throat:      Mouth: Mucous membranes are moist.   Eyes:      Extraocular Movements: Extraocular movements intact.   Neck:      Musculoskeletal: Normal range of motion.      Comments: RIJ central line  Cardiovascular:      Rate and Rhythm: Regular rhythm. Tachycardia present.      Pulses: Normal pulses.      Heart sounds: Normal heart sounds.   Pulmonary:      Breath sounds: Normal breath sounds. No wheezing.   Abdominal:      Comments: G tube with some mucoid drainage around entry site.   Musculoskeletal: Normal range of motion.   Skin:     General: Skin is warm and dry.      Coloration: Skin is not jaundiced or pale.      Comments: L peringuinal hematoma.   Neurological:      General: No focal deficit present.      Mental Status: She is alert.      Comments: AAOx2         Vents:  Vent Mode: Spont/T (09/08/20 1607)  Ventilator Initiated: Yes  (09/04/20 1130)  Set Rate: 14 BPM (09/08/20 1142)  Vt Set: 330 mL (09/08/20 1142)  Pressure Support: 5 cmH20 (09/08/20 1607)  PEEP/CPAP: 5 cmH20 (09/08/20 1607)  Oxygen Concentration (%): 28 (09/15/20 0001)  Peak Airway Pressure: 21 cmH2O (09/08/20 1142)  Plateau Pressure: 14 cmH20 (09/08/20 0850)  Total Ve: 4.54 mL (09/08/20 1142)  Negative Inspiratory Force (cm H2O): -35 (09/08/20 1607)  F/VT Ratio<105 (RSBI): (!) 43.21 (09/08/20 1142)    Lines/Drains/Airways     Central Venous Catheter Line            Percutaneous Central Line Insertion/Assessment - Triple Lumen  09/04/20 0800 right internal jugular 11 days          Drain                 Gastrostomy/Enterostomy 09/04/20 LUQ decompression 12 days         Urethral Catheter 09/14/20 1330 1 day                Significant Labs:    CBC/Anemia Profile:  Recent Labs   Lab 09/15/20  0314 09/15/20  1000 09/16/20  0300   WBC 10.79 11.53 8.82   HGB 7.0* 8.8* 8.3*   HCT 22.8* 28.4* 26.8*    172 166   MCV 95 92 93   RDW 17.6* 16.3* 17.7*        Chemistries:  Recent Labs   Lab 09/15/20  0314 09/16/20  0300   * 149*   K 3.2* 3.4*    115*   CO2 23 22*   BUN 46* 48*   CREATININE 1.4 1.3   CALCIUM 7.6* 7.7*   ALBUMIN 1.5* 1.4*   PROT 4.9* 4.8*   BILITOT 2.4* 2.5*   ALKPHOS 198* 179*   ALT 53* 46*   AST 46* 43*   MG 2.1 2.0   PHOS 2.9 3.5       All pertinent labs within the past 24 hours have been reviewed.    Significant Imaging:  I have reviewed all pertinent imaging results/findings within the past 24 hours.

## 2020-09-16 NOTE — NURSING
Dr. Damon made aware of patient's increasing confusion throughout the shift. Patient still oriented to place and self but not time. Patient still more appropriate than the day prior; however, has stated things to indicate she is confused. Patient still appropriate enough to not require restraints. No new orders given at this time.

## 2020-09-16 NOTE — ASSESSMENT & PLAN NOTE
JACQUE most likely secondary to hypoperfusion, likely ATN.  Pt also with non anion gap metabolic acidosis with respiratory compensation  Given Lasix 150mg IV x1 to poor UOP on 09/07    - No emergent need for dialysis today  -  cc/24 hrs, increased from day before   - lasix discontinued, last dose at 2 pm on 09/09  - Cr downtrend: 3.0-->3.2-->2.2-->1.7-->1.6-->1.5-->1.4-->1.3  - TPN started by primary team  - Strict I/O and chart   - Renally dose all medications   - Avoid nephrotoxic medications, NSAIDs, IV contrast, ACE/ARB.  - Maintain MAP > 65  - Hb > 7 gm/dL

## 2020-09-16 NOTE — PROGRESS NOTES
[facility]  General Surgery  Progress Note    Subjective:     History of Present Illness:  Ms. Hernandez is a 51yo F w/PMH of von Gierke disease s/p liver transplant (2002, on tacro + MMF, follows Dr. Nielsen), hx chronic rejection (bx 2015 with acute and chronic rejection s/p steroids), biliary stricture and ductopenic rejection, recently with cirrhosis on fibroscan (10/2019), HTN, and depression who presents as a transfer for further evaluation of gastric outlet obstruction and esophageal stricturing.  Patient decompensated requiring multiple pressors and intubation. CT scan showed free air. Prior to intubation patient reported severe abdominal pain.   states that patient has had epigastric pain, nausea, and vomiting for several days.     Post-Op Info:  Procedure(s) (LRB):  LAPAROTOMY, EXPLORATORY with abd closure (N/A)   10 Days Post-Op     Interval History:   NAEON.  Mental status better this AM. Still with drainage around tube site. Noted to not be tube feeds.     Medications:  Continuous Infusions:   TPN ADULT CENTRAL LINE CUSTOM 40 mL/hr at 09/16/20 1400    TPN ADULT CENTRAL LINE CUSTOM       Scheduled Meds:   acetaminophen  500 mg Per G Tube Q6H    fluconazole (DIFLUCAN) IVPB  200 mg Intravenous Daily    heparin (porcine)  5,000 Units Subcutaneous Q8H    [START ON 9/17/2020] levothyroxine  75 mcg Per G Tube Daily    [START ON 9/17/2020] pantoprazole  40 mg Per G Tube Daily    piperacillin-tazobactam (ZOSYN) IVPB  4.5 g Intravenous Q8H    QUEtiapine  100 mg Per G Tube BID    [START ON 9/17/2020] tacrolimus  1 mg Per G Tube Daily AM     PRN Meds:sodium chloride, albuterol-ipratropium, calcium gluconate IVPB, calcium gluconate IVPB, calcium gluconate IVPB, dextrose 50%, dextrose 50%, glucagon (human recombinant), glucose, glucose, haloperidol lactate, hydrALAZINE, insulin aspart U-100, labetaloL, magnesium sulfate IVPB, magnesium sulfate IVPB, melatonin, ondansetron, potassium  chloride in water **AND** potassium chloride in water **AND** potassium chloride in water, promethazine, sodium chloride 0.9%, sodium chloride 0.9%, sodium phosphate IVPB, sodium phosphate IVPB, sodium phosphate IVPB     Review of patient's allergies indicates:   Allergen Reactions    Codeine Itching     Other reaction(s): Itching    Lipitor [atorvastatin] Other (See Comments)     Other reaction(s): Muscle pain  Muscle cranmps    Morphine Itching     Other reaction(s): nausea and vomiting     Zoloft [sertraline] Other (See Comments)     Tremors/muscle spasms     Objective:     Vital Signs (Most Recent):  Temp: 99.9 °F (37.7 °C) (09/16/20 1430)  Pulse: 100 (09/16/20 1430)  Resp: (!) 22 (09/16/20 1430)  BP: (!) 119/56 (09/16/20 1400)  SpO2: 96 % (09/16/20 1430) Vital Signs (24h Range):  Temp:  [99.9 °F (37.7 °C)-100.4 °F (38 °C)] 99.9 °F (37.7 °C)  Pulse:  [] 100  Resp:  [16-41] 22  SpO2:  [94 %-100 %] 96 %  BP: (106-138)/(54-77) 119/56     Weight: 68 kg (149 lb 14.6 oz)  Body mass index is 30.28 kg/m².    Intake/Output - Last 3 Shifts       09/14 0700 - 09/15 0659 09/15 0700 - 09/16 0659 09/16 0700 - 09/17 0659    I.V. (mL/kg) 919 (13.5) 1166 (17.1)     Blood  322     NG/GT  220 375    TPN  1709     Total Intake(mL/kg) 919 (13.5) 3417 (50.3) 375 (5.5)    Urine (mL/kg/hr) 820 (0.5) 1135 (0.7) 465 (0.8)    Drains 75 150     Stool 0      Total Output 895 1285 465    Net +24 +2132 -90           Urine Occurrence 1 x      Stool Occurrence 1 x            Physical Exam  Constitutional:       General: She is not in acute distress.  Cardiovascular:      Rate and Rhythm: Regular rhythm. Tachycardia present.   Pulmonary:      Effort: Pulmonary effort is normal. No respiratory distress.   Abdominal:      General: There is no distension.      Palpations: Abdomen is soft.      Tenderness: There is no abdominal tenderness.      Comments: Incision c/d/i  Thick drainage around G tube   Neurological:      General: No focal  deficit present.      Mental Status: She is alert and oriented to person, place, and time.   Psychiatric:         Mood and Affect: Mood normal.         Behavior: Behavior normal.         Significant Labs:  CBC:   Recent Labs   Lab 09/16/20  0300   WBC 8.82   RBC 2.88*   HGB 8.3*   HCT 26.8*      MCV 93   MCH 28.8   MCHC 31.0*     CMP:   Recent Labs   Lab 09/16/20 0300   *   CALCIUM 7.7*   ALBUMIN 1.4*   PROT 4.8*   *   K 3.4*   CO2 22*   *   BUN 48*   CREATININE 1.3   ALKPHOS 179*   ALT 46*   AST 43*   BILITOT 2.5*       Significant Diagnostics:  I have reviewed all pertinent imaging results/findings within the past 24 hours.    Assessment/Plan:     * Perforated abdominal viscus  50 y.o. female w/ free air on CT scan. S/p emergent ex lap on 9/4 w/ findings of gastric perforation, primary repair. Now 10 Days Post-Op from abdominal closure on 9/6, skin stapled closed, KERRY drains removed.     Continue SICU care  Tube in good position.  Continue tube feeds  Zosyn and diflucan for suspected intraabodminal source, WBC improving          Rocio Hidalgo MD  General Surgery  [facility]

## 2020-09-16 NOTE — PROGRESS NOTES
[facility]  Nephrology  Progress Note    Patient Name: Elda Hernandez  MRN: [id number]  Admission Date: 9/3/2020  Hospital Length of Stay: 13 days  Attending Provider: Clark Rodriguez MD   Primary Care Physician: Jordana Woods MD  Principal Problem:Perforated abdominal viscus    Subjective:     HPI: 50 y.o. female that has a past medical history of Angiolipoma of kidney (10/1/2018), Arnold-Chiari malformation, Depression, Esophageal stricture, Essential tremor, Hypertension, Liver fibrosis, Osteoporosis, Recurrent urinary tract infection, Seizures, SIADH (syndrome of inappropriate ADH production), Squamous cell carcinoma (10/2014), and Von Gierke disease s/p liver transplant (2002, on tacro + MMF), HTN, and depression that presented to OSH due to worsening diffuse abdominal for 3 weeks, associated with nausea, vomiting and decreased PO intake. Abdominal CT scan showed gastric outlet narrowing, obstructive-related changes at pyloric-duodenal junction, and thickening of GE junction. EGD on 9/3 found with grade IV esophagitis with stricture. Pt transferred for GI evaluation and found with hypotension requiring vasopressors and lactic Acid of 3.9. Repeat CT showed decompressed abdomen and evidence of free air. Exploratory laparotomy performed and found with stomach perforation s/p washout, gastric perforation repair, and gastrostomy tube placement.  Nephrology consulted for JACQUE    Interval History: Patient seen and examined at bedside, Cr trending down.     Review of patient's allergies indicates:   Allergen Reactions    Codeine Itching     Other reaction(s): Itching    Lipitor [atorvastatin] Other (See Comments)     Other reaction(s): Muscle pain  Muscle cranmps    Morphine Itching     Other reaction(s): nausea and vomiting     Zoloft [sertraline] Other (See Comments)     Tremors/muscle spasms     Current Facility-Administered Medications   Medication Frequency    0.9%  NaCl infusion (for  blood administration) Q24H PRN    acetaminophen tablet 500 mg Q6H    albuterol-ipratropium 2.5 mg-0.5 mg/3 mL nebulizer solution 3 mL Q4H PRN    calcium gluconate 1g in dextrose 5% 100mL (ready to mix system) PRN    calcium gluconate 2 g in dextrose 5 % 100 mL IVPB PRN    calcium gluconate 3 g in dextrose 5 % 100 mL IVPB PRN    dextrose 50% injection 12.5 g PRN    dextrose 50% injection 25 g PRN    fluconazole (DIFLUCAN) IVPB 200 mg 100 mL Daily    glucagon (human recombinant) injection 1 mg PRN    glucose chewable tablet 16 g PRN    glucose chewable tablet 24 g PRN    haloperidol lactate injection 5 mg Q6H PRN    heparin (porcine) injection 5,000 Units Q8H    hydrALAZINE injection 10 mg Q8H PRN    insulin aspart U-100 pen 1-10 Units Q4H PRN    labetalol 20 mg/4 mL (5 mg/mL) IV syring Q6H PRN    [START ON 9/17/2020] levothyroxine tablet 75 mcg Daily    magnesium sulfate 2g in water 50mL IVPB (premix) PRN    magnesium sulfate 2g in water 50mL IVPB (premix) PRN    melatonin tablet 6 mg Nightly PRN    ondansetron injection 4 mg Q8H PRN    [START ON 9/17/2020] pantoprazole suspension 40 mg Daily    piperacillin-tazobactam 4.5 g in sodium chloride 0.9% 100 mL IVPB (ready to mix system) Q8H    potassium chloride 40 mEq in 100 mL IVPB (FOR CENTRAL LINE ADMINISTRATION ONLY) PRN    And    potassium chloride 20 mEq in 100 mL IVPB (FOR CENTRAL LINE ADMINISTRATION ONLY) PRN    And    potassium chloride 40 mEq in 100 mL IVPB (FOR CENTRAL LINE ADMINISTRATION ONLY) PRN    promethazine tablet 25 mg Q6H PRN    QUEtiapine tablet 100 mg BID    sodium chloride 0.9% flush 10 mL PRN    sodium chloride 0.9% flush 10 mL PRN    sodium phosphate 15 mmol in dextrose 5 % 250 mL IVPB PRN    sodium phosphate 20.01 mmol in dextrose 5 % 250 mL IVPB PRN    sodium phosphate 30 mmol in dextrose 5 % 250 mL IVPB PRN    [START ON 9/17/2020] tacrolimus (PROGRAF) 1 mg/mL oral syringe Daily AM    TPN ADULT CENTRAL LINE  CUSTOM Continuous    TPN ADULT CENTRAL LINE CUSTOM Continuous       Objective:     Vital Signs (Most Recent):  Temp: 100.2 °F (37.9 °C) (09/16/20 1330)  Pulse: 99 (09/16/20 1330)  Resp: (!) 22 (09/16/20 1330)  BP: (!) 110/57 (09/16/20 1330)  SpO2: 96 % (09/16/20 1330)  O2 Device (Oxygen Therapy): room air (09/16/20 1300) Vital Signs (24h Range):  Temp:  [99.9 °F (37.7 °C)-100.4 °F (38 °C)] 100.2 °F (37.9 °C)  Pulse:  [] 99  Resp:  [16-41] 22  SpO2:  [94 %-100 %] 96 %  BP: ()/(50-77) 110/57     Weight: 68 kg (149 lb 14.6 oz) (09/13/20 0500)  Body mass index is 30.28 kg/m².  Body surface area is 1.68 meters squared.    I/O last 3 completed shifts:  In: 3417 [I.V.:1166; Blood:322; NG/GT:220]  Out: 1725 [Urine:1575; Drains:150]    Physical Exam  Vitals signs and nursing note reviewed.   Constitutional:       General: She is not in acute distress.  HENT:      Head: Normocephalic and atraumatic.      Nose: Nose normal.      Mouth/Throat:      Mouth: Mucous membranes are moist.   Eyes:      Extraocular Movements: Extraocular movements intact.   Neck:      Musculoskeletal: Normal range of motion.      Comments: RIJ central line  Cardiovascular:      Rate and Rhythm: Regular rhythm. Tachycardia present.      Pulses: Normal pulses.      Heart sounds: Normal heart sounds.   Pulmonary:      Breath sounds: Normal breath sounds. No wheezing.   Abdominal:      Comments: G tube with some mucoid drainage around entry site.   Musculoskeletal: Normal range of motion.   Skin:     General: Skin is warm and dry.      Coloration: Skin is not jaundiced or pale.      Comments: L peringuinal hematoma.   Neurological:      General: No focal deficit present.      Mental Status: She is alert.      Comments: AAOx2         Significant Labs:  CBC:   Recent Labs   Lab 09/16/20  0300   WBC 8.82   RBC 2.88*   HGB 8.3*   HCT 26.8*      MCV 93   MCH 28.8   MCHC 31.0*     CMP:   Recent Labs   Lab 09/16/20  0300   *   CALCIUM  7.7*   ALBUMIN 1.4*   PROT 4.8*   *   K 3.4*   CO2 22*   *   BUN 48*   CREATININE 1.3   ALKPHOS 179*   ALT 46*   AST 43*   BILITOT 2.5*     Coagulation:   Recent Labs   Lab 09/16/20  0300   INR 1.1   APTT 38.7*     All labs within the past 24 hours have been reviewed.     Significant Imaging:  Labs: Reviewed    Assessment/Plan:     JACQUE (acute kidney injury)  JACQUE most likely secondary to hypoperfusion, likely ATN.  Pt also with non anion gap metabolic acidosis with respiratory compensation  Given Lasix 150mg IV x1 to poor UOP on 09/07    - No emergent need for dialysis today  -  cc/24 hrs, increased from day before   - lasix discontinued, last dose at 2 pm on 09/09  - Cr downtrend: 3.0-->3.2-->2.2-->1.7-->1.6-->1.5-->1.4-->1.3  - TPN started by primary team  - Strict I/O and chart   - Renally dose all medications   - Avoid nephrotoxic medications, NSAIDs, IV contrast, ACE/ARB.  - Maintain MAP > 65  - Hb > 7 gm/dL        Hypernatremia  Closely monitor BMP for resolution  On  q 6 hrs     Hypokalemia  3.4 on latest BMP  Replenish      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  Management as per Hepatology   On Prograf, monitor daily levels         Thank you for your consult. I will sign off. Please contact us if you have any additional questions.    Al Shea MD  Nephrology  [facility]

## 2020-09-16 NOTE — SUBJECTIVE & OBJECTIVE
Interval History:   NAEON.  Mental status better this AM. Still with drainage around tube site. Noted to not be tube feeds.     Medications:  Continuous Infusions:   TPN ADULT CENTRAL LINE CUSTOM 40 mL/hr at 09/16/20 1400    TPN ADULT CENTRAL LINE CUSTOM       Scheduled Meds:   acetaminophen  500 mg Per G Tube Q6H    fluconazole (DIFLUCAN) IVPB  200 mg Intravenous Daily    heparin (porcine)  5,000 Units Subcutaneous Q8H    [START ON 9/17/2020] levothyroxine  75 mcg Per G Tube Daily    [START ON 9/17/2020] pantoprazole  40 mg Per G Tube Daily    piperacillin-tazobactam (ZOSYN) IVPB  4.5 g Intravenous Q8H    QUEtiapine  100 mg Per G Tube BID    [START ON 9/17/2020] tacrolimus  1 mg Per G Tube Daily AM     PRN Meds:sodium chloride, albuterol-ipratropium, calcium gluconate IVPB, calcium gluconate IVPB, calcium gluconate IVPB, dextrose 50%, dextrose 50%, glucagon (human recombinant), glucose, glucose, haloperidol lactate, hydrALAZINE, insulin aspart U-100, labetaloL, magnesium sulfate IVPB, magnesium sulfate IVPB, melatonin, ondansetron, potassium chloride in water **AND** potassium chloride in water **AND** potassium chloride in water, promethazine, sodium chloride 0.9%, sodium chloride 0.9%, sodium phosphate IVPB, sodium phosphate IVPB, sodium phosphate IVPB     Review of patient's allergies indicates:   Allergen Reactions    Codeine Itching     Other reaction(s): Itching    Lipitor [atorvastatin] Other (See Comments)     Other reaction(s): Muscle pain  Muscle cranmps    Morphine Itching     Other reaction(s): nausea and vomiting     Zoloft [sertraline] Other (See Comments)     Tremors/muscle spasms     Objective:     Vital Signs (Most Recent):  Temp: 99.9 °F (37.7 °C) (09/16/20 1430)  Pulse: 100 (09/16/20 1430)  Resp: (!) 22 (09/16/20 1430)  BP: (!) 119/56 (09/16/20 1400)  SpO2: 96 % (09/16/20 1430) Vital Signs (24h Range):  Temp:  [99.9 °F (37.7 °C)-100.4 °F (38 °C)] 99.9 °F (37.7 °C)  Pulse:  []  100  Resp:  [16-41] 22  SpO2:  [94 %-100 %] 96 %  BP: (106-138)/(54-77) 119/56     Weight: 68 kg (149 lb 14.6 oz)  Body mass index is 30.28 kg/m².    Intake/Output - Last 3 Shifts       09/14 0700 - 09/15 0659 09/15 0700 - 09/16 0659 09/16 0700 - 09/17 0659    I.V. (mL/kg) 919 (13.5) 1166 (17.1)     Blood  322     NG/GT  220 375    TPN  1709     Total Intake(mL/kg) 919 (13.5) 3417 (50.3) 375 (5.5)    Urine (mL/kg/hr) 820 (0.5) 1135 (0.7) 465 (0.8)    Drains 75 150     Stool 0      Total Output 895 1285 465    Net +24 +2132 -90           Urine Occurrence 1 x      Stool Occurrence 1 x            Physical Exam  Constitutional:       General: She is not in acute distress.  Cardiovascular:      Rate and Rhythm: Regular rhythm. Tachycardia present.   Pulmonary:      Effort: Pulmonary effort is normal. No respiratory distress.   Abdominal:      General: There is no distension.      Palpations: Abdomen is soft.      Tenderness: There is no abdominal tenderness.      Comments: Incision c/d/i  Thick drainage around G tube   Neurological:      General: No focal deficit present.      Mental Status: She is alert and oriented to person, place, and time.   Psychiatric:         Mood and Affect: Mood normal.         Behavior: Behavior normal.         Significant Labs:  CBC:   Recent Labs   Lab 09/16/20  0300   WBC 8.82   RBC 2.88*   HGB 8.3*   HCT 26.8*      MCV 93   MCH 28.8   MCHC 31.0*     CMP:   Recent Labs   Lab 09/16/20  0300   *   CALCIUM 7.7*   ALBUMIN 1.4*   PROT 4.8*   *   K 3.4*   CO2 22*   *   BUN 48*   CREATININE 1.3   ALKPHOS 179*   ALT 46*   AST 43*   BILITOT 2.5*       Significant Diagnostics:  I have reviewed all pertinent imaging results/findings within the past 24 hours.

## 2020-09-16 NOTE — TREATMENT PLAN
Hepatology Treatment Plan    Elda Hernandez is a 50 y.o. female admitted to hospital 9/3/2020 (Hospital Day: 14) due to Perforated abdominal viscus. Hepatology following for immunosuppression management.    Lab Results   Component Value Date    TACROLIMUS 4.3 (L) 09/16/2020    TACROLIMUS 4.3 (L) 09/15/2020    TACROLIMUS 7.0 09/14/2020       Lab Results   Component Value Date    CREATININE 1.3 09/16/2020    CREATININE 1.4 09/15/2020    CREATININE 1.5 (H) 09/14/2020    CREATININE 1.5 (H) 09/14/2020       Lab Results   Component Value Date    ALT 46 (H) 09/16/2020    AST 43 (H) 09/16/2020     (H) 04/07/2018    ALKPHOS 179 (H) 09/16/2020    BILITOT 2.5 (H) 09/16/2020    WBC 8.82 09/16/2020    HGB 8.3 (L) 09/16/2020     09/16/2020       Kidney function is stable  Liver enzymes are stable    Plan  - Continue immunosuppression regimen without changes    Immunosuppression Regimen:  Tacrolimus: 0.5mg daily sublingual daily    Please notify hepatology on day of discharge to discuss discharge medications and follow up.     Please obtain daily CBC, BMP, LFT, INR, immunosuppressant trough level    Thank you for involving us in the care of Elda Hernandez. Please call with any additional concerns or questions.    John Mustafa MD  Gastroenterology Fellow

## 2020-09-16 NOTE — PROGRESS NOTES
[facility]  Critical Care - Surgery  Progress Note    Patient Name: Elda Hernandez  MRN: [id number]  Admission Date: 9/3/2020  Hospital Length of Stay: 13 days  Code Status: Full Code  Attending Provider: Clark Rodriguez MD  Primary Care Provider: Jordana Woods MD   Principal Problem: Perforated abdominal viscus    Subjective:     Hospital/ICU Course:  9/4: Pt admitted to SICU following ex lap for GI perf. Intubated, sedated. Wound vac in place.  Soon after arrival to unit a mottled appearance to RUE was noted.  Vascular consulted and US revealed R radial artery thrombosis.    9/5: Tachycardic..  R arm appearance greatly improved overnight.    9/6: Patient returned to OR for abdominal washout, closure. R arm with dopplerable signals to ulnar artery and palmar arch.   9/7: Attempted to wean patient off of sedation in an effort to move towards extubation. Patient weaned off of propofol. Precedex started. Patient's heart rate very labile and sensitize to sedation.   9/8: patient extubated  9/9: SHANICE. Labetalol PRN for HTN   9/10:  Diuresis increased.  Cr downtrending  9/11: Increased abdominal distension and elevated WBC count. CT abd  9/12: WBC to 45 today. Diflucan and zosyn initiated. Lactulose enema w/ improving mental status  9/13: WBC improved. Mental status improving. FWF initiated for hypernatremia.   9/14: White count continues to improve.  Oriented to person only this morning.      Interval History/Significant Events: NAEO.  Pt resting comfortably during interview.  Oriented to person and place.  Uncertain of year.    Follow-up For: Procedure(s) (LRB):  LAPAROTOMY, EXPLORATORY with abd closure (N/A)    Post-Operative Day: 10 Days Post-Op    Objective:     Vital Signs (Most Recent):  Temp: 100 °F (37.8 °C) (09/16/20 0745)  Pulse: 102 (09/16/20 0745)  Resp: (!) 24 (09/16/20 0745)  BP: (!) 120/58 (09/16/20 0730)  SpO2: 99 % (09/16/20 0745) Vital Signs (24h Range):  Temp:  [98.4 °F (36.9  °C)-100.8 °F (38.2 °C)] 100 °F (37.8 °C)  Pulse:  [] 102  Resp:  [16-46] 24  SpO2:  [91 %-100 %] 99 %  BP: ()/(50-82) 120/58     Weight: 68 kg (149 lb 14.6 oz)  Body mass index is 30.28 kg/m².      Intake/Output Summary (Last 24 hours) at 9/16/2020 0749  Last data filed at 9/16/2020 0705  Gross per 24 hour   Intake 3105 ml   Output 1315 ml   Net 1790 ml       Physical Exam  Vitals signs and nursing note reviewed.   Constitutional:       General: She is not in acute distress.  HENT:      Head: Normocephalic and atraumatic.      Nose: Nose normal.      Mouth/Throat:      Mouth: Mucous membranes are moist.   Eyes:      Extraocular Movements: Extraocular movements intact.   Neck:      Musculoskeletal: Normal range of motion.      Comments: RIJ central line  Cardiovascular:      Rate and Rhythm: Regular rhythm. Tachycardia present.      Pulses: Normal pulses.      Heart sounds: Normal heart sounds.   Pulmonary:      Breath sounds: Normal breath sounds. No wheezing.   Abdominal:      Comments: G tube with some mucoid drainage around entry site.   Musculoskeletal: Normal range of motion.   Skin:     General: Skin is warm and dry.      Coloration: Skin is not jaundiced or pale.      Comments: L peringuinal hematoma.   Neurological:      General: No focal deficit present.      Mental Status: She is alert.      Comments: AAOx2         Vents:  Vent Mode: Spont/T (09/08/20 1607)  Ventilator Initiated: Yes (09/04/20 1130)  Set Rate: 14 BPM (09/08/20 1142)  Vt Set: 330 mL (09/08/20 1142)  Pressure Support: 5 cmH20 (09/08/20 1607)  PEEP/CPAP: 5 cmH20 (09/08/20 1607)  Oxygen Concentration (%): 28 (09/15/20 0001)  Peak Airway Pressure: 21 cmH2O (09/08/20 1142)  Plateau Pressure: 14 cmH20 (09/08/20 0850)  Total Ve: 4.54 mL (09/08/20 1142)  Negative Inspiratory Force (cm H2O): -35 (09/08/20 1607)  F/VT Ratio<105 (RSBI): (!) 43.21 (09/08/20 1142)    Lines/Drains/Airways     Central Venous Catheter Line             Percutaneous Central Line Insertion/Assessment - Triple Lumen  09/04/20 0800 right internal jugular 11 days          Drain                 Gastrostomy/Enterostomy 09/04/20 LUQ decompression 12 days         Urethral Catheter 09/14/20 1330 1 day                Significant Labs:    CBC/Anemia Profile:  Recent Labs   Lab 09/15/20  0314 09/15/20  1000 09/16/20  0300   WBC 10.79 11.53 8.82   HGB 7.0* 8.8* 8.3*   HCT 22.8* 28.4* 26.8*    172 166   MCV 95 92 93   RDW 17.6* 16.3* 17.7*        Chemistries:  Recent Labs   Lab 09/15/20  0314 09/16/20  0300   * 149*   K 3.2* 3.4*    115*   CO2 23 22*   BUN 46* 48*   CREATININE 1.4 1.3   CALCIUM 7.6* 7.7*   ALBUMIN 1.5* 1.4*   PROT 4.9* 4.8*   BILITOT 2.4* 2.5*   ALKPHOS 198* 179*   ALT 53* 46*   AST 46* 43*   MG 2.1 2.0   PHOS 2.9 3.5       All pertinent labs within the past 24 hours have been reviewed.    Significant Imaging:  I have reviewed all pertinent imaging results/findings within the past 24 hours.    Assessment/Plan:     * Perforated abdominal viscus  Neuro:  -sedation: none  -analgesia: PRN oxy. Dilauded  -seroquel 100mg BID     CV:   - heparin gtt discontinued 9/9.   - HDS off of pressors    Pulm:  - Satting well on room air this AM  - Duonebs Q6 PRN     FEN/GI: Perforated stomach; s/p washout and patch (9/4); abdominal closure (9/6)  - s/p washout and closure on 9/6   - Tube feeds at trickle; advance to goal as tolerated  - G tube with mucoid drainage around insertion site; decreased since discontinuing FWF; CTM   - Lactulose discontinued (9/14) in setting of normal ammonia level (9/10) and improving mental status  - Daily metabolic panel with PRN repletion of electrolytes  - pantoprazole for ulcer ppx  - Hypernatremic; FWF 200mL Q6 through G-tube     Renal: Pt with JACQUE upon arrival; BUN/Cr: 32/2.1 (9/4)  - JACQUE improving  - Continue to monitor with daily metabolic labs  - nephrology on board, appreciate recs     HEME/ID:   - H/H stable    - Daily  CBC  - s/p Liver transplant in 2002; Daily tacrolimus level with AM labs. Per hepatology, continue tacrolimus 1mg BID  - Diflucan and Zosyn  - Lovenox for DVT ppx     Endo:  - Synthroid 40mcg daily    Dispo:  - Continue SICU care    Critical care was time spent personally by me on the following activities: development of treatment plan with patient or surrogate and bedside caregivers, discussions with consultants, evaluation of patient's response to treatment, examination of patient, ordering and performing treatments and interventions, ordering and review of laboratory studies, ordering and review of radiographic studies, pulse oximetry, re-evaluation of patient's condition.  This critical care time did not overlap with that of any other provider or involve time for any procedures.     John Spivey MD  Critical Care - Surgery  [facility]

## 2020-09-16 NOTE — PHYSICIAN QUERY
PT Name: Elda Hernandez  MR #: [id number]    Consultant Diagnosis Clarification     CDS/: Justyna Hayes RN, CDS               Contact information: [email or web address]    This form is a permanent document in the medical record.    Query Date: September 16, 2020      By submitting this query, we are merely seeking further clarification of documentation.  Please utilize your independent clinical judgment when addressing the question(s) below.    The Medical Record reflects the following:    Clinical Information Location in Medical Record   Pt intubated on vent, s/p ex lap and G tube, with wound vac in place. Family in room reported PTA pt with poor PO intake only tolerating boost for weeks and recent wt loss of ~15 lbs. Partial NFPE completed, pt with edema. Meets criteria for moderate malnutrition at this time.    BMI (Calculated): 25.8    Energy Intake: <50% of estimated energy requirement for >3 weeks     Muscle Mass Depletion: mild depletion of temples     Weight Loss: 11% x 3 weeks per family     Fluid Accumulation: moderate    Assessment and Plan  Nutrition Problem:   Moderate Protein-Calorie Malnutrition    Recommendations   1.) Advance diet as tolerated to low fiber per SLP texture recommendations once medically appropriate.   2.) If EN warranted, continue Impact Peptide 1.5; goal rate at 35mL/hr.   4.) Continue TPN of 70gm AA, 100gm of dextrose, 50gm of lipids to provide 1460kcal Nutrition Consult 9/6           Nutrition Consult 9/6     Nutrition Consult 9/6     Nutrition Consult 9/6     Nutrition Consult 9/6     Nutrition Consult 9/6     Nutrition Consult 9/6         Nutrition PN 9/15        Please clarify/confirm the Consultants diagnosis of __moderate malnutrition__:     [ x ] Diagnosis ruled in   [  ] Diagnosis ruled out   [  ] Other diagnosis (please specify): _____________________________   [  ] Clinically undetermined     Present on admission (POA) status:   [   ] Yes (Y)                           [  ] Clinically Undetermined (W)  [   ] No (N)                            [   ] Documentation insufficient to determine if condition is POA (U)

## 2020-09-16 NOTE — PLAN OF CARE
"      SICU PLAN OF CARE NOTE    Dx: Perforated abdominal viscus    Shift Events: No acute events overnight. Electrolytes replaced. Oxygenation weaned off.    Neuro: AAO x4, Follows Commands, and Moves All Extremities    Vital Signs: BP (!) 126/58 (BP Location: Left arm, Patient Position: Lying)   Pulse 98   Temp 99.9 °F (37.7 °C)   Resp 20   Ht 4' 11" (1.499 m)   Wt 68 kg (149 lb 14.6 oz)   SpO2 98%   BMI 30.28 kg/m²     Respiratory: Room Air    Diet: TPN and Tube Feeds at goal rate of 10 mL/hr.    Urine Output: Urinary Catheter 465 cc/shift of yellow urine.    Drains: G-tube/J-tube, total output 0 cc /  shift due to G Tube not being connected to suction. 10 cc / shift of residual collected.    Restraints applied to patient at the beginning of shift due to confusion and history of pulling out lines. Able to discontinue restraints due to patient being oriented and understanding about goals of care and safety of patient. No injuries noted underneath restraints.     Labs/Accuchecks:   - WBC: 8.82  - Hgb: 8.3  - Hct: 26.8  - Platelets: 166  - INR: 1.1  - aPTT: 38.7  - Na: 149  - K: 3.4  - BUN: 48  - Creatinine: 1.3  - Ca: 7.7  - Ma.0  - Phos: 2.5  - AST: 43  - ALT: 46  "

## 2020-09-16 NOTE — ASSESSMENT & PLAN NOTE
50 y.o. female w/ free air on CT scan. S/p emergent ex lap on 9/4 w/ findings of gastric perforation, primary repair. Now 10 Days Post-Op from abdominal closure on 9/6, skin stapled closed, KERRY drains removed.     Continue SICU care  Tube in good position.  Continue tube feeds  Zosyn and diflucan for suspected intraabodminal source, WBC improving

## 2020-09-16 NOTE — PLAN OF CARE
SW attempted to speak with patient at bedside regarding discharge planning and safety. SW notified by patient's BS RN that she is still not fully oriented and that it may be better to speak with her tomorrow. JESSICA will continue to follow-up.     Tigist Moody LMSW   - Case Management

## 2020-09-16 NOTE — ASSESSMENT & PLAN NOTE
Neuro:  -sedation: none  -analgesia: PRN oxy. Dilauded  -seroquel 100mg BID     CV:   - heparin gtt discontinued 9/9.   - HDS off of pressors    Pulm:  - Satting well on room air this AM  - Duonebs Q6 PRN     FEN/GI: Perforated stomach; s/p washout and patch (9/4); abdominal closure (9/6)  - s/p washout and closure on 9/6   - Tube feeds at trickle; advance to goal as tolerated  - G tube with mucoid drainage around insertion site; decreased since discontinuing FWF; CTM   - Lactulose discontinued (9/14) in setting of normal ammonia level (9/10) and improving mental status  - Daily metabolic panel with PRN repletion of electrolytes  - pantoprazole for ulcer ppx  - Hypernatremic; FWF 200mL Q6 through G-tube     Renal: Pt with JACQUE upon arrival; BUN/Cr: 32/2.1 (9/4)  - JACQUE improving  - Continue to monitor with daily metabolic labs  - nephrology on board, appreciate recs     HEME/ID:   - H/H stable    - Daily CBC  - s/p Liver transplant in 2002; Daily tacrolimus level with AM labs. Per hepatology, continue tacrolimus 1mg BID  - Diflucan and Zosyn  - Lovenox for DVT ppx     Endo:  - Synthroid 40mcg daily    Dispo:  - Continue SICU care

## 2020-09-17 LAB
ALBUMIN SERPL BCP-MCNC: 1.5 G/DL (ref 3.5–5.2)
ALP SERPL-CCNC: 211 U/L (ref 55–135)
ALT SERPL W/O P-5'-P-CCNC: 44 U/L (ref 10–44)
ANION GAP SERPL CALC-SCNC: 12 MMOL/L (ref 8–16)
ANISOCYTOSIS BLD QL SMEAR: SLIGHT
APTT BLDCRRT: 30.5 SEC (ref 21–32)
AST SERPL-CCNC: 42 U/L (ref 10–40)
BASOPHILS # BLD AUTO: 0.03 K/UL (ref 0–0.2)
BASOPHILS NFR BLD: 0.2 % (ref 0–1.9)
BILIRUB SERPL-MCNC: 3.4 MG/DL (ref 0.1–1)
BUN SERPL-MCNC: 48 MG/DL (ref 6–20)
CALCIUM SERPL-MCNC: 8.3 MG/DL (ref 8.7–10.5)
CHLORIDE SERPL-SCNC: 115 MMOL/L (ref 95–110)
CO2 SERPL-SCNC: 21 MMOL/L (ref 23–29)
CREAT SERPL-MCNC: 1.2 MG/DL (ref 0.5–1.4)
DIFFERENTIAL METHOD: ABNORMAL
EOSINOPHIL # BLD AUTO: 0.1 K/UL (ref 0–0.5)
EOSINOPHIL NFR BLD: 0.8 % (ref 0–8)
ERYTHROCYTE [DISTWIDTH] IN BLOOD BY AUTOMATED COUNT: 17.9 % (ref 11.5–14.5)
EST. GFR  (AFRICAN AMERICAN): >60 ML/MIN/1.73 M^2
EST. GFR  (NON AFRICAN AMERICAN): 52.1 ML/MIN/1.73 M^2
GLUCOSE SERPL-MCNC: 115 MG/DL (ref 70–110)
HCT VFR BLD AUTO: 31.1 % (ref 37–48.5)
HGB BLD-MCNC: 9.5 G/DL (ref 12–16)
IMM GRANULOCYTES # BLD AUTO: 0.38 K/UL (ref 0–0.04)
IMM GRANULOCYTES NFR BLD AUTO: 2.7 % (ref 0–0.5)
INR PPP: 1.1 (ref 0.8–1.2)
LYMPHOCYTES # BLD AUTO: 0.8 K/UL (ref 1–4.8)
LYMPHOCYTES NFR BLD: 5.3 % (ref 18–48)
MAGNESIUM SERPL-MCNC: 1.9 MG/DL (ref 1.6–2.6)
MCH RBC QN AUTO: 28.5 PG (ref 27–31)
MCHC RBC AUTO-ENTMCNC: 30.5 G/DL (ref 32–36)
MCV RBC AUTO: 93 FL (ref 82–98)
MONOCYTES # BLD AUTO: 1.2 K/UL (ref 0.3–1)
MONOCYTES NFR BLD: 8.4 % (ref 4–15)
NEUTROPHILS # BLD AUTO: 11.8 K/UL (ref 1.8–7.7)
NEUTROPHILS NFR BLD: 82.6 % (ref 38–73)
NRBC BLD-RTO: 0 /100 WBC
PHOSPHATE SERPL-MCNC: 3.1 MG/DL (ref 2.7–4.5)
PLATELET # BLD AUTO: 196 K/UL (ref 150–350)
PLATELET BLD QL SMEAR: ABNORMAL
PMV BLD AUTO: 11.4 FL (ref 9.2–12.9)
POCT GLUCOSE: 109 MG/DL (ref 70–110)
POCT GLUCOSE: 117 MG/DL (ref 70–110)
POCT GLUCOSE: 81 MG/DL (ref 70–110)
POCT GLUCOSE: 97 MG/DL (ref 70–110)
POLYCHROMASIA BLD QL SMEAR: ABNORMAL
POTASSIUM SERPL-SCNC: 3.7 MMOL/L (ref 3.5–5.1)
PROT SERPL-MCNC: 5.6 G/DL (ref 6–8.4)
PROTHROMBIN TIME: 11.8 SEC (ref 9–12.5)
RBC # BLD AUTO: 3.33 M/UL (ref 4–5.4)
SODIUM SERPL-SCNC: 148 MMOL/L (ref 136–145)
TACROLIMUS BLD-MCNC: 6.4 NG/ML (ref 5–15)
WBC # BLD AUTO: 14.32 K/UL (ref 3.9–12.7)

## 2020-09-17 PROCEDURE — 97530 THERAPEUTIC ACTIVITIES: CPT

## 2020-09-17 PROCEDURE — 83735 ASSAY OF MAGNESIUM: CPT

## 2020-09-17 PROCEDURE — B4185 PARENTERAL SOL 10 GM LIPIDS: HCPCS | Performed by: SURGERY

## 2020-09-17 PROCEDURE — 25000003 PHARM REV CODE 250: Performed by: STUDENT IN AN ORGANIZED HEALTH CARE EDUCATION/TRAINING PROGRAM

## 2020-09-17 PROCEDURE — 97535 SELF CARE MNGMENT TRAINING: CPT

## 2020-09-17 PROCEDURE — 84100 ASSAY OF PHOSPHORUS: CPT

## 2020-09-17 PROCEDURE — 80197 ASSAY OF TACROLIMUS: CPT

## 2020-09-17 PROCEDURE — 63600175 PHARM REV CODE 636 W HCPCS: Performed by: SURGERY

## 2020-09-17 PROCEDURE — 63600175 PHARM REV CODE 636 W HCPCS: Performed by: STUDENT IN AN ORGANIZED HEALTH CARE EDUCATION/TRAINING PROGRAM

## 2020-09-17 PROCEDURE — 20000000 HC ICU ROOM

## 2020-09-17 PROCEDURE — 25000003 PHARM REV CODE 250: Performed by: SURGERY

## 2020-09-17 PROCEDURE — A4217 STERILE WATER/SALINE, 500 ML: HCPCS | Performed by: SURGERY

## 2020-09-17 PROCEDURE — 99233 SBSQ HOSP IP/OBS HIGH 50: CPT | Mod: 24,GC,, | Performed by: SURGERY

## 2020-09-17 PROCEDURE — 85025 COMPLETE CBC W/AUTO DIFF WBC: CPT

## 2020-09-17 PROCEDURE — 99900035 HC TECH TIME PER 15 MIN (STAT)

## 2020-09-17 PROCEDURE — 94761 N-INVAS EAR/PLS OXIMETRY MLT: CPT

## 2020-09-17 PROCEDURE — 80053 COMPREHEN METABOLIC PANEL: CPT

## 2020-09-17 PROCEDURE — 99233 PR SUBSEQUENT HOSPITAL CARE,LEVL III: ICD-10-PCS | Mod: 24,GC,, | Performed by: SURGERY

## 2020-09-17 PROCEDURE — 85610 PROTHROMBIN TIME: CPT

## 2020-09-17 PROCEDURE — 85730 THROMBOPLASTIN TIME PARTIAL: CPT

## 2020-09-17 RX ORDER — TALC
6 POWDER (GRAM) TOPICAL NIGHTLY
Status: DISCONTINUED | OUTPATIENT
Start: 2020-09-17 | End: 2020-09-18

## 2020-09-17 RX ADMIN — QUETIAPINE FUMARATE 100 MG: 100 TABLET ORAL at 09:09

## 2020-09-17 RX ADMIN — HALOPERIDOL LACTATE 5 MG: 5 INJECTION, SOLUTION INTRAMUSCULAR at 11:09

## 2020-09-17 RX ADMIN — HALOPERIDOL LACTATE 5 MG: 5 INJECTION, SOLUTION INTRAMUSCULAR at 01:09

## 2020-09-17 RX ADMIN — ACETAMINOPHEN 500 MG: 325 TABLET ORAL at 11:09

## 2020-09-17 RX ADMIN — ACETAMINOPHEN 500 MG: 325 TABLET ORAL at 05:09

## 2020-09-17 RX ADMIN — POTASSIUM CHLORIDE 40 MEQ: 29.8 INJECTION, SOLUTION INTRAVENOUS at 05:09

## 2020-09-17 RX ADMIN — PIPERACILLIN SODIUM AND TAZOBACTAM SODIUM 4.5 G: 4; .5 INJECTION, POWDER, LYOPHILIZED, FOR SOLUTION INTRAVENOUS at 04:09

## 2020-09-17 RX ADMIN — HEPARIN SODIUM 5000 UNITS: 5000 INJECTION INTRAVENOUS; SUBCUTANEOUS at 05:09

## 2020-09-17 RX ADMIN — TACROLIMUS 1 MG: 1 CAPSULE, GELATIN COATED ORAL at 08:09

## 2020-09-17 RX ADMIN — QUETIAPINE FUMARATE 100 MG: 100 TABLET ORAL at 08:09

## 2020-09-17 RX ADMIN — ONDANSETRON 4 MG: 2 INJECTION INTRAMUSCULAR; INTRAVENOUS at 09:09

## 2020-09-17 RX ADMIN — HEPARIN SODIUM 5000 UNITS: 5000 INJECTION INTRAVENOUS; SUBCUTANEOUS at 10:09

## 2020-09-17 RX ADMIN — HEPARIN SODIUM 5000 UNITS: 5000 INJECTION INTRAVENOUS; SUBCUTANEOUS at 01:09

## 2020-09-17 RX ADMIN — MELATONIN TAB 3 MG 6 MG: 3 TAB at 08:09

## 2020-09-17 RX ADMIN — PIPERACILLIN SODIUM AND TAZOBACTAM SODIUM 4.5 G: 4; .5 INJECTION, POWDER, LYOPHILIZED, FOR SOLUTION INTRAVENOUS at 08:09

## 2020-09-17 RX ADMIN — I.V. FAT EMULSION 250 ML: 20 EMULSION INTRAVENOUS at 10:09

## 2020-09-17 RX ADMIN — LEVOTHYROXINE SODIUM 75 MCG: 75 TABLET ORAL at 09:09

## 2020-09-17 RX ADMIN — PIPERACILLIN SODIUM AND TAZOBACTAM SODIUM 4.5 G: 4; .5 INJECTION, POWDER, LYOPHILIZED, FOR SOLUTION INTRAVENOUS at 11:09

## 2020-09-17 RX ADMIN — ACETAMINOPHEN 500 MG: 325 TABLET ORAL at 12:09

## 2020-09-17 RX ADMIN — PANTOPRAZOLE SODIUM 40 MG: 40 GRANULE, DELAYED RELEASE ORAL at 09:09

## 2020-09-17 RX ADMIN — MAGNESIUM SULFATE HEPTAHYDRATE: 500 INJECTION, SOLUTION INTRAMUSCULAR; INTRAVENOUS at 10:09

## 2020-09-17 RX ADMIN — FLUCONAZOLE 200 MG: 2 INJECTION, SOLUTION INTRAVENOUS at 09:09

## 2020-09-17 NOTE — PROGRESS NOTES
[facility]  General Surgery  Progress Note    Subjective:     History of Present Illness:  Ms. Hernandez is a 51yo F w/PMH of von Gierke disease s/p liver transplant (2002, on tacro + MMF, follows Dr. Nielsen), hx chronic rejection (bx 2015 with acute and chronic rejection s/p steroids), biliary stricture and ductopenic rejection, recently with cirrhosis on fibroscan (10/2019), HTN, and depression who presents as a transfer for further evaluation of gastric outlet obstruction and esophageal stricturing.  Patient decompensated requiring multiple pressors and intubation. CT scan showed free air. Prior to intubation patient reported severe abdominal pain.   states that patient has had epigastric pain, nausea, and vomiting for several days.     Post-Op Info:  Procedure(s) (LRB):  LAPAROTOMY, EXPLORATORY with abd closure (N/A)   11 Days Post-Op     Interval History:   NAEON.     Medications:  Continuous Infusions:   TPN ADULT CENTRAL LINE CUSTOM 40 mL/hr at 09/17/20 1200    TPN ADULT CENTRAL LINE CUSTOM       Scheduled Meds:   acetaminophen  500 mg Per G Tube Q6H    fat emulsion 20%  250 mL Intravenous Daily    fluconazole (DIFLUCAN) IVPB  200 mg Intravenous Daily    heparin (porcine)  5,000 Units Subcutaneous Q8H    levothyroxine  75 mcg Per G Tube Daily    melatonin  6 mg Per G Tube Nightly    pantoprazole  40 mg Per G Tube Daily    piperacillin-tazobactam (ZOSYN) IVPB  4.5 g Intravenous Q8H    QUEtiapine  100 mg Per G Tube BID    tacrolimus  1 mg Per G Tube Daily AM     PRN Meds:sodium chloride, albuterol-ipratropium, calcium gluconate IVPB, calcium gluconate IVPB, calcium gluconate IVPB, dextrose 50%, dextrose 50%, glucagon (human recombinant), glucose, glucose, haloperidol lactate, hydrALAZINE, insulin aspart U-100, labetaloL, magnesium sulfate IVPB, magnesium sulfate IVPB, melatonin, ondansetron, potassium chloride in water **AND** potassium chloride in water **AND** potassium  chloride in water, promethazine, sodium chloride 0.9%, sodium chloride 0.9%, sodium phosphate IVPB, sodium phosphate IVPB, sodium phosphate IVPB     Review of patient's allergies indicates:   Allergen Reactions    Codeine Itching     Other reaction(s): Itching    Lipitor [atorvastatin] Other (See Comments)     Other reaction(s): Muscle pain  Muscle cranmps    Morphine Itching     Other reaction(s): nausea and vomiting     Zoloft [sertraline] Other (See Comments)     Tremors/muscle spasms     Objective:     Vital Signs (Most Recent):  Temp: 97.2 °F (36.2 °C) (09/17/20 1115)  Pulse: (!) 111 (09/17/20 1215)  Resp: (!) 35 (09/17/20 1215)  BP: 113/70 (09/17/20 1200)  SpO2: 97 % (09/17/20 1215) Vital Signs (24h Range):  Temp:  [97.2 °F (36.2 °C)-100.4 °F (38 °C)] 97.2 °F (36.2 °C)  Pulse:  [] 111  Resp:  [20-55] 35  SpO2:  [90 %-99 %] 97 %  BP: (113-162)/() 113/70     Weight: 68 kg (149 lb 14.6 oz)  Body mass index is 30.28 kg/m².    Intake/Output - Last 3 Shifts       09/15 0700 - 09/16 0659 09/16 0700 - 09/17 0659 09/17 0700 - 09/18 0659    I.V. (mL/kg) 1166 (17.1) 903 (13.3)     Blood 322      NG/ 1615 70    TPN 1709 951     Total Intake(mL/kg) 3417 (50.3) 3469 (51) 70 (1)    Urine (mL/kg/hr) 1135 (0.7) 1540 (0.9) 240 (0.4)    Drains 150      Stool  0     Total Output 1285 1540 240    Net +2132 +1929 -170           Stool Occurrence  2 x           Physical Exam  Constitutional:       General: She is not in acute distress.  Cardiovascular:      Rate and Rhythm: Regular rhythm. Tachycardia present.   Pulmonary:      Effort: Pulmonary effort is normal. No respiratory distress.   Abdominal:      General: There is no distension.      Palpations: Abdomen is soft.      Tenderness: There is no abdominal tenderness.      Comments: Incision c/d/i  Thick drainage around G tube   Neurological:      General: No focal deficit present.      Mental Status: She is alert and oriented to person, place, and time.    Psychiatric:         Mood and Affect: Mood normal.         Behavior: Behavior normal.         Significant Labs:  CBC:   Recent Labs   Lab 09/17/20  0300   WBC 14.32*   RBC 3.33*   HGB 9.5*   HCT 31.1*      MCV 93   MCH 28.5   MCHC 30.5*     CMP:   Recent Labs   Lab 09/17/20  0300   *   CALCIUM 8.3*   ALBUMIN 1.5*   PROT 5.6*   *   K 3.7   CO2 21*   *   BUN 48*   CREATININE 1.2   ALKPHOS 211*   ALT 44   AST 42*   BILITOT 3.4*       Significant Diagnostics:  I have reviewed all pertinent imaging results/findings within the past 24 hours.    Assessment/Plan:     * Perforated abdominal viscus  50 y.o. female w/ free air on CT scan. S/p emergent ex lap on 9/4 w/ findings of gastric perforation, primary repair. Now 11 Days Post-Op from abdominal closure on 9/6, skin stapled closed, KERRY drains removed.     Continue SICU care  Tube in good position.  Continue tube feeds  Zosyn and diflucan for suspected intraabodminal source, WBC improving  Ok to step down out of the ICU today          Rocio Hidalgo MD  General Surgery  [facility]

## 2020-09-17 NOTE — PLAN OF CARE
Problem: Occupational Therapy Goal  Goal: Occupational Therapy Goal  Description: Goals to be met by: 9/25/20     Patient will increase functional independence with ADLs by performing:    Feeding with Set-up Assistance.  UE Dressing with Stand-by Assistance.  LE Dressing with Stand-by Assistance.  Grooming while standing at sink with Stand-by Assistance.  Toileting from toilet with Stand-by Assistance for hygiene and clothing management.   Toilet transfer to toilet with Stand-by Assistance.    Outcome: Ongoing, Not Progressing   The above goals remain appropriate. PAULINE Geronimo  9/17/2020

## 2020-09-17 NOTE — ASSESSMENT & PLAN NOTE
Neuro:  -sedation: none  -analgesia: PRN oxy. Dilaudid  -seroquel 100mg BID  - haldol PRN at night     CV:   - heparin gtt discontinued 9/9.   - HDS off of pressors    Pulm:  - Satting well on room air this AM  - Duonebs Q6 PRN     FEN/GI: Perforated stomach; s/p washout and patch (9/4); abdominal closure (9/6)  - s/p washout and closure on 9/6   - Tube feeds at trickle; advance to goal as tolerated  - G tube with mucoid drainage around insertion site; decreased since discontinuing FWF; CTM   - Lactulose discontinued (9/14) in setting of normal ammonia level (9/10) and improving mental status  - Daily metabolic panel with PRN repletion of electrolytes  - pantoprazole for ulcer ppx  - Hypernatremic; FWF 200mL Q6 through G-tube  - tolerating tube feeds, halve TPN rate today.     Renal: Pt with JACQUE upon arrival; BUN/Cr: 32/2.1 (9/4)  - JACQUE improving  - Continue to monitor with daily metabolic labs  - nephrology on board, appreciate recs  - hypernatremia improving      HEME/ID:   - H/H stable    - Daily CBC  - s/p Liver transplant in 2002; Daily tacrolimus level with AM labs. Per hepatology, continue tacrolimus 1mg BID  - Diflucan and Zosyn  - Lovenox for DVT ppx     Endo:  - Synthroid 40mcg daily    Dispo:  - Continue SICU care

## 2020-09-17 NOTE — PLAN OF CARE
[facility] continues to follow patient. She is too low functioning currently but may progress.     SW awaits patient to be more oriented to review discharge planning and safety.        09/17/20 1347   Post-Acute Status   Post-Acute Authorization Placement   Post-Acute Placement Status Patient Evaluation by Facility   Discharge Plan   Discharge Plan A Skilled Nursing Facility     Tigist Moody LMSW   - Case Management

## 2020-09-17 NOTE — PLAN OF CARE
09/17/20 0932   Discharge Reassessment   Assessment Type Discharge Planning Reassessment   Provided patient/caregiver education on the expected discharge date and the discharge plan No   Do you have any problems affording any of your prescribed medications? No   Discharge Plan A Skilled Nursing Facility   Discharge Plan B Home Health   DME Needed Upon Discharge  other (see comments)  (TBD)   Anticipated Discharge Disposition SNF   Post-Acute Status   Discharge Delays None known at this time       Susana Pringle MPH, RN, CM  Ext. [phone]

## 2020-09-17 NOTE — PT/OT/SLP PROGRESS
Physical Therapy Treatment    Patient Name:  Elda Hernandez   MRN:  [id number]  Admit Date: 9/3/2020  Admitting Diagnosis:  Perforated abdominal viscus   Length of Stay: 14 days  Recent Surgery: Procedure(s) (LRB):  LAPAROTOMY, EXPLORATORY with abd closure (N/A) 11 Days Post-Op    Recommendations:     Discharge Recommendations:  nursing facility, skilled   Discharge Equipment Recommendations: (TBD)   Barriers to discharge: None    Plan:     During this hospitalization, patient to be seen 3 x/week to address the listed problems via gait training, therapeutic activities, therapeutic exercises, neuromuscular re-education  · Plan of Care Expires:  10/13/20   Plan of Care Reviewed with: patient    Assessment:     Elda Hernandez is a 50 y.o. female admitted with a medical diagnosis of Perforated abdominal viscus. Patient was awake in bed upon entering room. Patient was oriented to person and place only and rambled frequently when talking. Patient required CGA for bed mobility and min A for supine>sit and sit>supine transfers. Patient demonstrated need for max A upon rising to sitting position but decreased to CGA after repositioning. Patient then had profuse discharge from abdomen region, RN was notified, and patient was returned to supine. Feeding tube site appeared to have been loose and substance on bed and floor matched color of tube feed. Plan to continue to increase patients functional mobility next session by working on EOB tolerance and possible progressive advancement to OOB movements.    Problem List: weakness, impaired endurance, impaired self care skills, impaired functional mobilty, impaired balance, impaired cognition, decreased upper extremity function, decreased lower extremity function, decreased safety awareness.  Rehab Prognosis: Good     GOALS:   Multidisciplinary Problems     Physical Therapy Goals        Problem: Physical Therapy Goal    Goal Priority Disciplines Outcome Goal Variances  "Interventions   Physical Therapy Goal     PT, PT/OT Ongoing, Progressing     Description: Goals to be met by: 20     Patient will increase functional independence with mobility by performin. Supine to sit with MInimal Assistance - met ()  2. Sit to supine with MInimal Assistance - met ()  3. Sit to stand transfer with Minimal Assistance  4. Bed to chair transfer with Minimal Assistance using Rolling Walker or no AD.   5. Gait  x 25 feet with Minimal Assistance using Rolling Walker or no AD.    6. Lower extremity exercise program x30 reps per handout, with independence                     Subjective   Communicated with RN prior to session.  Patient found HOB elevated upon PT entry to room, agreeable to evaluation. Elda Hernandez was alone present during session.    Chief Complaint: None noted  Patient/Family Comments/goals: To get better and return home  Pain/Comfort:  · Pain Rating 1: (Patient provided inconsistent rating (0-9/10 with rambaling))  · Pain Rating Post-Intervention 1: (Patient did not rate)    Objective:   Patient found with: pulse ox (continuous), blood pressure cuff, telemetry, central line, mei catheter(G Tube)   General Precautions: Standard, Cardiac fall   Orthopedic Precautions:N/A   Braces: N/A   Oxygen Device: Room Air  Vitals: BP (!) 132/90 (BP Location: Left arm, Patient Position: Lying)   Pulse (!) 113   Temp 97.2 °F (36.2 °C) (Oral)   Resp (!) 39   Ht 4' 11" (1.499 m)   Wt 68 kg (149 lb 14.6 oz)   SpO2 95%   BMI 30.28 kg/m²     Outcome Measures:  AM-PAC 6 CLICK MOBILITY  Turning over in bed (including adjusting bedclothes, sheets and blankets)?: 3  Sitting down on and standing up from a chair with arms (e.g., wheelchair, bedside commode, etc.): 2  Moving from lying on back to sitting on the side of the bed?: 3  Moving to and from a bed to a chair (including a wheelchair)?: 2  Need to walk in hospital room?: 1  Climbing 3-5 steps with a railing?: 1  Basic " Mobility Total Score: 12     Cognition:   · Alert, Cooperative and Confused  · AxOx2 to person and place   · Command following: Follows multistep  commands  · Fluency: clear/fluent with mumbling and rambling     Functional Mobility:  Additional staff present: OT  Bed Mobility:    Rolling/Turning to Right: contact guard assistance   Scooting to HOB via drawsheet: maximal assistance x2 persons   Supine to Sit: minimum assistance; HOB elevated   Scooting anteriorly to EOB to have both feet planted on floor: maximal assistance   Sit to Supine: minimum assistance; HOB flat    Sitting Balance at Edge of Bed:   Assistance Level Required: Maximum Assistance to CGA   Time: 3   Postural deviations noted: poor midline awareness and posterior trunk lean   Facilitated: Patient repositioned to facilitate hip and trunk flexion and decreased dependence to CGA   Comments: Patient tolerated sitting EOB with no dizziness or changes in vitals  o PT noted pooling of unknown fluid on floor, assessed source and was from feed tube site, and patient returned to supine and RN notified.      Transfers:   · Not performed 2nd to profuse discharge     Therapeutic Activities, Exercises, and Education:   Patient educated on PT role/POC  Patient educated on importance of progressive mobility    Therapeutic activity:   EOB sitting tolerance and functional mobility to sit EOB      Patient left HOB elevated with all lines intact, call button in reach and RN present..    Time Tracking:     PT Received On: 09/17/20  PT Start Time: 0738     PT Stop Time: 0752  PT Total Time (min): 14 min     Billable Minutes:   · Therapeutic Activity 10    Treatment Type: Treatment  PT/PTA: PT       Linus Rodriguez, SPT  9/17/2020    Kamila Rowley, PT, DPT  9/17/2020  [phone]

## 2020-09-17 NOTE — SUBJECTIVE & OBJECTIVE
Interval History/Significant Events: no acute events overnight. Received haldol.     Follow-up For: Procedure(s) (LRB):  LAPAROTOMY, EXPLORATORY with abd closure (N/A)    Post-Operative Day: 11 Days Post-Op    Objective:     Vital Signs (Most Recent):  Temp: 98.9 °F (37.2 °C) (09/17/20 0300)  Pulse: (!) 111 (09/17/20 0545)  Resp: (!) 28 (09/17/20 0545)  BP: 134/65 (09/17/20 0500)  SpO2: 96 % (09/17/20 0545) Vital Signs (24h Range):  Temp:  [98.9 °F (37.2 °C)-100.4 °F (38 °C)] 98.9 °F (37.2 °C)  Pulse:  [] 111  Resp:  [20-51] 28  SpO2:  [93 %-99 %] 96 %  BP: (106-162)/() 134/65     Weight: 68 kg (149 lb 14.6 oz)  Body mass index is 30.28 kg/m².      Intake/Output Summary (Last 24 hours) at 9/17/2020 0601  Last data filed at 9/17/2020 0500  Gross per 24 hour   Intake 3234 ml   Output 1480 ml   Net 1754 ml       Physical Exam  Constitutional:       General: She is not in acute distress.  Cardiovascular:      Rate and Rhythm: Regular rhythm. Tachycardia present.   Pulmonary:      Effort: Pulmonary effort is normal. No respiratory distress.   Abdominal:      General: There is no distension.      Palpations: Abdomen is soft.      Tenderness: There is no abdominal tenderness.      Comments: Incision c/d/i  Thick drainage around G tube   Neurological:      General: No focal deficit present.      Mental Status: She is alert. She is disoriented.   Psychiatric:         Mood and Affect: Mood normal.         Behavior: Behavior normal.         Vents:  Vent Mode: Spont/T (09/08/20 1607)  Ventilator Initiated: Yes (09/04/20 1130)  Set Rate: 14 BPM (09/08/20 1142)  Vt Set: 330 mL (09/08/20 1142)  Pressure Support: 5 cmH20 (09/08/20 1607)  PEEP/CPAP: 5 cmH20 (09/08/20 1607)  Oxygen Concentration (%): 28 (09/15/20 0001)  Peak Airway Pressure: 21 cmH2O (09/08/20 1142)  Plateau Pressure: 14 cmH20 (09/08/20 0850)  Total Ve: 4.54 mL (09/08/20 1142)  Negative Inspiratory Force (cm H2O): -35 (09/08/20 1607)  F/VT Ratio<105  (RSBI): (!) 43.21 (09/08/20 1142)    Lines/Drains/Airways     Central Venous Catheter Line            Percutaneous Central Line Insertion/Assessment - Triple Lumen  09/04/20 0800 right internal jugular 12 days          Drain                 Gastrostomy/Enterostomy 09/04/20 LUQ decompression 13 days         Urethral Catheter 09/14/20 1330 2 days                Significant Labs:    CBC/Anemia Profile:  Recent Labs   Lab 09/15/20  1000 09/16/20  0300 09/17/20  0300   WBC 11.53 8.82 14.32*   HGB 8.8* 8.3* 9.5*   HCT 28.4* 26.8* 31.1*    166 196   MCV 92 93 93   RDW 16.3* 17.7* 17.9*        Chemistries:  Recent Labs   Lab 09/16/20  0300 09/17/20  0300   * 148*   K 3.4* 3.7   * 115*   CO2 22* 21*   BUN 48* 48*   CREATININE 1.3 1.2   CALCIUM 7.7* 8.3*   ALBUMIN 1.4* 1.5*   PROT 4.8* 5.6*   BILITOT 2.5* 3.4*   ALKPHOS 179* 211*   ALT 46* 44   AST 43* 42*   MG 2.0 1.9   PHOS 3.5 3.1       All pertinent labs within the past 24 hours have been reviewed.    Significant Imaging:  I have reviewed and interpreted all pertinent imaging results/findings within the past 24 hours.

## 2020-09-17 NOTE — SUBJECTIVE & OBJECTIVE
Interval History:   NAEON.     Medications:  Continuous Infusions:   TPN ADULT CENTRAL LINE CUSTOM 40 mL/hr at 09/17/20 1200    TPN ADULT CENTRAL LINE CUSTOM       Scheduled Meds:   acetaminophen  500 mg Per G Tube Q6H    fat emulsion 20%  250 mL Intravenous Daily    fluconazole (DIFLUCAN) IVPB  200 mg Intravenous Daily    heparin (porcine)  5,000 Units Subcutaneous Q8H    levothyroxine  75 mcg Per G Tube Daily    melatonin  6 mg Per G Tube Nightly    pantoprazole  40 mg Per G Tube Daily    piperacillin-tazobactam (ZOSYN) IVPB  4.5 g Intravenous Q8H    QUEtiapine  100 mg Per G Tube BID    tacrolimus  1 mg Per G Tube Daily AM     PRN Meds:sodium chloride, albuterol-ipratropium, calcium gluconate IVPB, calcium gluconate IVPB, calcium gluconate IVPB, dextrose 50%, dextrose 50%, glucagon (human recombinant), glucose, glucose, haloperidol lactate, hydrALAZINE, insulin aspart U-100, labetaloL, magnesium sulfate IVPB, magnesium sulfate IVPB, melatonin, ondansetron, potassium chloride in water **AND** potassium chloride in water **AND** potassium chloride in water, promethazine, sodium chloride 0.9%, sodium chloride 0.9%, sodium phosphate IVPB, sodium phosphate IVPB, sodium phosphate IVPB     Review of patient's allergies indicates:   Allergen Reactions    Codeine Itching     Other reaction(s): Itching    Lipitor [atorvastatin] Other (See Comments)     Other reaction(s): Muscle pain  Muscle cranmps    Morphine Itching     Other reaction(s): nausea and vomiting     Zoloft [sertraline] Other (See Comments)     Tremors/muscle spasms     Objective:     Vital Signs (Most Recent):  Temp: 97.2 °F (36.2 °C) (09/17/20 1115)  Pulse: (!) 111 (09/17/20 1215)  Resp: (!) 35 (09/17/20 1215)  BP: 113/70 (09/17/20 1200)  SpO2: 97 % (09/17/20 1215) Vital Signs (24h Range):  Temp:  [97.2 °F (36.2 °C)-100.4 °F (38 °C)] 97.2 °F (36.2 °C)  Pulse:  [] 111  Resp:  [20-55] 35  SpO2:  [90 %-99 %] 97 %  BP: (113-162)/()  113/70     Weight: 68 kg (149 lb 14.6 oz)  Body mass index is 30.28 kg/m².    Intake/Output - Last 3 Shifts       09/15 0700 - 09/16 0659 09/16 0700 - 09/17 0659 09/17 0700 - 09/18 0659    I.V. (mL/kg) 1166 (17.1) 903 (13.3)     Blood 322      NG/ 1615 70    TPN 1709 951     Total Intake(mL/kg) 3417 (50.3) 3469 (51) 70 (1)    Urine (mL/kg/hr) 1135 (0.7) 1540 (0.9) 240 (0.4)    Drains 150      Stool  0     Total Output 1285 1540 240    Net +2132 +1929 -170           Stool Occurrence  2 x           Physical Exam  Constitutional:       General: She is not in acute distress.  Cardiovascular:      Rate and Rhythm: Regular rhythm. Tachycardia present.   Pulmonary:      Effort: Pulmonary effort is normal. No respiratory distress.   Abdominal:      General: There is no distension.      Palpations: Abdomen is soft.      Tenderness: There is no abdominal tenderness.      Comments: Incision c/d/i  Thick drainage around G tube   Neurological:      General: No focal deficit present.      Mental Status: She is alert and oriented to person, place, and time.   Psychiatric:         Mood and Affect: Mood normal.         Behavior: Behavior normal.         Significant Labs:  CBC:   Recent Labs   Lab 09/17/20  0300   WBC 14.32*   RBC 3.33*   HGB 9.5*   HCT 31.1*      MCV 93   MCH 28.5   MCHC 30.5*     CMP:   Recent Labs   Lab 09/17/20  0300   *   CALCIUM 8.3*   ALBUMIN 1.5*   PROT 5.6*   *   K 3.7   CO2 21*   *   BUN 48*   CREATININE 1.2   ALKPHOS 211*   ALT 44   AST 42*   BILITOT 3.4*       Significant Diagnostics:  I have reviewed all pertinent imaging results/findings within the past 24 hours.

## 2020-09-17 NOTE — PROGRESS NOTES
[facility]  Critical Care - Surgery  Progress Note    Patient Name: Elda Hernandez  MRN: [id number]  Admission Date: 9/3/2020  Hospital Length of Stay: 14 days  Code Status: Full Code  Attending Provider: Clark Rodriguez MD  Primary Care Provider: Jordana Woods MD   Principal Problem: Perforated abdominal viscus    Subjective:     Hospital/ICU Course:  9/4: Pt admitted to SICU following ex lap for GI perf. Intubated, sedated. Wound vac in place.  Soon after arrival to unit a mottled appearance to RUE was noted.  Vascular consulted and US revealed R radial artery thrombosis.    9/5: Tachycardic..  R arm appearance greatly improved overnight.    9/6: Patient returned to OR for abdominal washout, closure. R arm with dopplerable signals to ulnar artery and palmar arch.   9/7: Attempted to wean patient off of sedation in an effort to move towards extubation. Patient weaned off of propofol. Precedex started. Patient's heart rate very labile and sensitize to sedation.   9/8: patient extubated  9/9: SHANICE. Labetalol PRN for HTN   9/10:  Diuresis increased.  Cr downtrending  9/11: Increased abdominal distension and elevated WBC count. CT abd  9/12: WBC to 45 today. Diflucan and zosyn initiated. Lactulose enema w/ improving mental status  9/13: WBC improved. Mental status improving. FWF initiated for hypernatremia.   9/14: White count continues to improve.  Oriented to person only this morning.    9/17: tolerating tube feeds, half TPN rate today. Possible step down     Interval History/Significant Events: no acute events overnight. Received haldol.     Follow-up For: Procedure(s) (LRB):  LAPAROTOMY, EXPLORATORY with abd closure (N/A)    Post-Operative Day: 11 Days Post-Op    Objective:     Vital Signs (Most Recent):  Temp: 98.9 °F (37.2 °C) (09/17/20 0300)  Pulse: (!) 111 (09/17/20 0545)  Resp: (!) 28 (09/17/20 0545)  BP: 134/65 (09/17/20 0500)  SpO2: 96 % (09/17/20 0545) Vital Signs (24h  Range):  Temp:  [98.9 °F (37.2 °C)-100.4 °F (38 °C)] 98.9 °F (37.2 °C)  Pulse:  [] 111  Resp:  [20-51] 28  SpO2:  [93 %-99 %] 96 %  BP: (106-162)/() 134/65     Weight: 68 kg (149 lb 14.6 oz)  Body mass index is 30.28 kg/m².      Intake/Output Summary (Last 24 hours) at 9/17/2020 0601  Last data filed at 9/17/2020 0500  Gross per 24 hour   Intake 3234 ml   Output 1480 ml   Net 1754 ml       Physical Exam  Constitutional:       General: She is not in acute distress.  Cardiovascular:      Rate and Rhythm: Regular rhythm. Tachycardia present.   Pulmonary:      Effort: Pulmonary effort is normal. No respiratory distress.   Abdominal:      General: There is no distension.      Palpations: Abdomen is soft.      Tenderness: There is no abdominal tenderness.      Comments: Incision c/d/i  Thick drainage around G tube   Neurological:      General: No focal deficit present.      Mental Status: She is alert. She is disoriented.   Psychiatric:         Mood and Affect: Mood normal.         Behavior: Behavior normal.         Vents:  Vent Mode: Spont/T (09/08/20 1607)  Ventilator Initiated: Yes (09/04/20 1130)  Set Rate: 14 BPM (09/08/20 1142)  Vt Set: 330 mL (09/08/20 1142)  Pressure Support: 5 cmH20 (09/08/20 1607)  PEEP/CPAP: 5 cmH20 (09/08/20 1607)  Oxygen Concentration (%): 28 (09/15/20 0001)  Peak Airway Pressure: 21 cmH2O (09/08/20 1142)  Plateau Pressure: 14 cmH20 (09/08/20 0850)  Total Ve: 4.54 mL (09/08/20 1142)  Negative Inspiratory Force (cm H2O): -35 (09/08/20 1607)  F/VT Ratio<105 (RSBI): (!) 43.21 (09/08/20 1142)    Lines/Drains/Airways     Central Venous Catheter Line            Percutaneous Central Line Insertion/Assessment - Triple Lumen  09/04/20 0800 right internal jugular 12 days          Drain                 Gastrostomy/Enterostomy 09/04/20 LUQ decompression 13 days         Urethral Catheter 09/14/20 1330 2 days                Significant Labs:    CBC/Anemia Profile:  Recent Labs   Lab  09/15/20  1000 09/16/20  0300 09/17/20  0300   WBC 11.53 8.82 14.32*   HGB 8.8* 8.3* 9.5*   HCT 28.4* 26.8* 31.1*    166 196   MCV 92 93 93   RDW 16.3* 17.7* 17.9*        Chemistries:  Recent Labs   Lab 09/16/20  0300 09/17/20  0300   * 148*   K 3.4* 3.7   * 115*   CO2 22* 21*   BUN 48* 48*   CREATININE 1.3 1.2   CALCIUM 7.7* 8.3*   ALBUMIN 1.4* 1.5*   PROT 4.8* 5.6*   BILITOT 2.5* 3.4*   ALKPHOS 179* 211*   ALT 46* 44   AST 43* 42*   MG 2.0 1.9   PHOS 3.5 3.1       All pertinent labs within the past 24 hours have been reviewed.    Significant Imaging:  I have reviewed and interpreted all pertinent imaging results/findings within the past 24 hours.    Assessment/Plan:     * Perforated abdominal viscus  Neuro:  -sedation: none  -analgesia: PRN oxy. Dilaudid  -seroquel 100mg BID  - haldol PRN at night     CV:   - heparin gtt discontinued 9/9.   - HDS off of pressors    Pulm:  - Satting well on room air this AM  - Duonebs Q6 PRN     FEN/GI: Perforated stomach; s/p washout and patch (9/4); abdominal closure (9/6)  - s/p washout and closure on 9/6   - Tube feeds at trickle; advance to goal as tolerated  - G tube with mucoid drainage around insertion site; decreased since discontinuing FWF; CTM   - Lactulose discontinued (9/14) in setting of normal ammonia level (9/10) and improving mental status  - Daily metabolic panel with PRN repletion of electrolytes  - pantoprazole for ulcer ppx  - Hypernatremic; FWF 200mL Q6 through G-tube  - tolerating tube feeds, halve TPN rate today.     Renal: Pt with JACQUE upon arrival; BUN/Cr: 32/2.1 (9/4)  - JACQUE improving  - Continue to monitor with daily metabolic labs  - nephrology on board, appreciate recs  - hypernatremia improving      HEME/ID:   - H/H stable    - Daily CBC  - s/p Liver transplant in 2002; Daily tacrolimus level with AM labs. Per hepatology, continue tacrolimus 1mg BID  - Diflucan and Zosyn  - Lovenox for DVT ppx     Endo:  - Synthroid 40mcg  daily    Dispo:  - Continue SICU care       Carl Pino MD  Critical Care - Surgery  [facility]

## 2020-09-17 NOTE — ASSESSMENT & PLAN NOTE
50 y.o. female w/ free air on CT scan. S/p emergent ex lap on 9/4 w/ findings of gastric perforation, primary repair. Now 11 Days Post-Op from abdominal closure on 9/6, skin stapled closed, KERRY drains removed.     Continue SICU care  Tube in good position.  Continue tube feeds  Zosyn and diflucan for suspected intraabodminal source, WBC improving  Ok to step down out of the ICU today

## 2020-09-17 NOTE — NURSING
Walking in to patient's room noticed she was not in bed. Went to other side of bed and patient sitting on floor upright with back against bed. Monitor still attatched, vital signs stable. Side rails up x4, bed in lowest position, bed lock on, pt has fall risk bracelet on. Charge nurse notified, MD with SICU team to bedside to assess patient. No obvious injuries noted, pt states she did not fall, she states got up then sat on ground. Sitter ordered through house. Pt helped back to bed. States no complaints at present. Will continue to monitor.

## 2020-09-17 NOTE — PLAN OF CARE
Problem: Physical Therapy Goal  Goal: Physical Therapy Goal  Description: Goals to be met by: 20     Patient will increase functional independence with mobility by performin. Supine to sit with MInimal Assistance - met ()  2. Sit to supine with MInimal Assistance - met ()  3. Sit to stand transfer with Minimal Assistance  4. Bed to chair transfer with Minimal Assistance using Rolling Walker or no AD.   5. Gait  x 25 feet with Minimal Assistance using Rolling Walker or no AD.    6. Lower extremity exercise program x30 reps per handout, with independence    Outcome: Ongoing, Progressing     Treatment completed. Goals #1, and #2 met. Goals remain appropriate.

## 2020-09-17 NOTE — PLAN OF CARE
Pt vss. Afebrile. , ST. MAP>65. Room air, sats>95%. UOP 50-100cc/hr. TF @ goal of 35cc/hr, minimal residuals, 200cc water bolus given Q6hrs. TPN infusing @ 40cc/hr. 2 BM overnight. Pt disoriented to time and situation, confused and intermittently agitated-PRN haldol given once. Pt did not sleep overnight despite medications given. Mumbling continuously throughout the night. Labs drawn and replaced per order. Plan of care reviewed with patient, all questions/concerns addressed. Will continue to monitor.      Skin: No new skin breakdown noted. Mucous/yellow/tan drainage around G tube site, cleansed and redressed. Mid abdominal incision GABY, CDI. Bruising noted to L fem site from previous A line. Foams and SCDs in place. Heels elevated on pillow. Bed plugged in.

## 2020-09-17 NOTE — PT/OT/SLP PROGRESS
Occupational Therapy   Treatment    Name: Elda Hernandez  MRN: [id number]  Admitting Diagnosis:  Perforated abdominal viscus  11 Days Post-Op    Recommendations:     Discharge Recommendations: nursing facility, skilled  Discharge Equipment Recommendations:  (TBD)  Barriers to discharge:  (Unknown)    Assessment:     Elda Hernandez is a 50 y.o. female with a medical diagnosis of Perforated abdominal viscus.  She presents with improved overall mentation, however generalized confusion and rambling persists. Performance deficits affecting function are weakness, impaired cognition, decreased safety awareness, impaired functional mobilty, impaired cardiopulmonary response to activity, impaired coordination, gait instability, impaired endurance, impaired balance, impaired self care skills, impaired fine motor, pain.     Rehab Prognosis:  Good; patient would benefit from acute skilled OT services to address these deficits and reach maximum level of function.       Plan:     Patient to be seen 3 x/week to address the above listed problems via self-care/home management, therapeutic activities, therapeutic exercises  · Plan of Care Expires: 10/14/20  · Plan of Care Reviewed with: patient    Subjective     Pain/Comfort:  · Pain Rating 1: (endorsed abdominal pain, but unable to rate accurately)  · Pain Addressed 1: Reposition, Distraction    Objective:     Communicated with: RN prior to session.  Patient found supine with blood pressure cuff, pulse ox (continuous), telemetry, central line, PEG Tube, mei catheter upon OT entry to room.    General Precautions: Standard, fall   Orthopedic Precautions:    Braces:       Occupational Performance:     Bed Mobility:    · Patient completed Rolling/Turning to Right with contact guard assistance and with side rail  · Patient completed Supine to Sit with minimum assistance  · Patient completed Sit to Supine with minimum assistance     Functional Mobility/Transfers:  · Deferred 2*  "profuse drainaged from  PEG tube     Activities of Daily Living:  · Grooming: maximal assistance for washing face and neck       AMPA 6 Click ADL: 6    Treatment & Education:  Pt ed on OT POC  Pt oriented to person and "[facility]" this session  Daily orientation provided  Pt sat EOB initially with max A 2* pushing backwards, then CGA after repositioned  Pt noted to have profuse drainage from PEG tube, thus returned supine and RN notified; further activity deferred    Patient left supine with all lines intact, call button in reach, RN notified and RN presentEducation:      GOALS:   Multidisciplinary Problems     Occupational Therapy Goals        Problem: Occupational Therapy Goal    Goal Priority Disciplines Outcome Interventions   Occupational Therapy Goal     OT, PT/OT Ongoing, Not Progressing    Description: Goals to be met by: 9/25/20     Patient will increase functional independence with ADLs by performing:    Feeding with Set-up Assistance.  UE Dressing with Stand-by Assistance.  LE Dressing with Stand-by Assistance.  Grooming while standing at sink with Stand-by Assistance.  Toileting from toilet with Stand-by Assistance for hygiene and clothing management.   Toilet transfer to toilet with Stand-by Assistance.                     Time Tracking:     OT Date of Treatment: 09/17/20  OT Start Time: 0737  OT Stop Time: 0751  OT Total Time (min): 14 min    Billable Minutes:Self Care/Home Management 14    PAULINE Geronimo  9/17/2020    "

## 2020-09-18 ENCOUNTER — ANESTHESIA (OUTPATIENT)
Dept: SURGERY | Facility: HOSPITAL | Age: 50
DRG: 853 | End: 2020-09-18
Payer: MEDICARE

## 2020-09-18 ENCOUNTER — ANESTHESIA EVENT (OUTPATIENT)
Dept: SURGERY | Facility: HOSPITAL | Age: 50
DRG: 853 | End: 2020-09-18
Payer: MEDICARE

## 2020-09-18 LAB
ALBUMIN SERPL BCP-MCNC: 1.3 G/DL (ref 3.5–5.2)
ALBUMIN SERPL BCP-MCNC: 1.4 G/DL (ref 3.5–5.2)
ALLENS TEST: ABNORMAL
ALP SERPL-CCNC: 232 U/L (ref 55–135)
ALP SERPL-CCNC: 254 U/L (ref 55–135)
ALT SERPL W/O P-5'-P-CCNC: 36 U/L (ref 10–44)
ALT SERPL W/O P-5'-P-CCNC: 37 U/L (ref 10–44)
AMMONIA PLAS-SCNC: 94 UMOL/L (ref 10–50)
ANION GAP SERPL CALC-SCNC: 12 MMOL/L (ref 8–16)
ANION GAP SERPL CALC-SCNC: 13 MMOL/L (ref 8–16)
ANION GAP SERPL CALC-SCNC: 9 MMOL/L (ref 8–16)
ANISOCYTOSIS BLD QL SMEAR: SLIGHT
ANISOCYTOSIS BLD QL SMEAR: SLIGHT
APTT BLDCRRT: 31.2 SEC (ref 21–32)
AST SERPL-CCNC: 40 U/L (ref 10–40)
AST SERPL-CCNC: 41 U/L (ref 10–40)
BASOPHILS # BLD AUTO: 0.03 K/UL (ref 0–0.2)
BASOPHILS # BLD AUTO: 0.09 K/UL (ref 0–0.2)
BASOPHILS NFR BLD: 0.2 % (ref 0–1.9)
BASOPHILS NFR BLD: 0.4 % (ref 0–1.9)
BILIRUB SERPL-MCNC: 3 MG/DL (ref 0.1–1)
BILIRUB SERPL-MCNC: 3.5 MG/DL (ref 0.1–1)
BUN SERPL-MCNC: 59 MG/DL (ref 6–20)
BUN SERPL-MCNC: 65 MG/DL (ref 6–20)
BUN SERPL-MCNC: 75 MG/DL (ref 6–20)
BURR CELLS BLD QL SMEAR: ABNORMAL
BURR CELLS BLD QL SMEAR: ABNORMAL
CALCIUM SERPL-MCNC: 8.4 MG/DL (ref 8.7–10.5)
CALCIUM SERPL-MCNC: 8.5 MG/DL (ref 8.7–10.5)
CALCIUM SERPL-MCNC: 9.1 MG/DL (ref 8.7–10.5)
CHLORIDE SERPL-SCNC: 115 MMOL/L (ref 95–110)
CO2 SERPL-SCNC: 19 MMOL/L (ref 23–29)
CO2 SERPL-SCNC: 19 MMOL/L (ref 23–29)
CO2 SERPL-SCNC: 21 MMOL/L (ref 23–29)
CREAT SERPL-MCNC: 1.6 MG/DL (ref 0.5–1.4)
CREAT SERPL-MCNC: 1.7 MG/DL (ref 0.5–1.4)
CREAT SERPL-MCNC: 1.7 MG/DL (ref 0.5–1.4)
DELSYS: ABNORMAL
DIFFERENTIAL METHOD: ABNORMAL
DIFFERENTIAL METHOD: ABNORMAL
DOHLE BOD BLD QL SMEAR: PRESENT
EOSINOPHIL # BLD AUTO: 0.1 K/UL (ref 0–0.5)
EOSINOPHIL # BLD AUTO: 0.1 K/UL (ref 0–0.5)
EOSINOPHIL NFR BLD: 0.5 % (ref 0–8)
EOSINOPHIL NFR BLD: 0.6 % (ref 0–8)
ERYTHROCYTE [DISTWIDTH] IN BLOOD BY AUTOMATED COUNT: 18.1 % (ref 11.5–14.5)
ERYTHROCYTE [DISTWIDTH] IN BLOOD BY AUTOMATED COUNT: 18.1 % (ref 11.5–14.5)
EST. GFR  (AFRICAN AMERICAN): 39.4 ML/MIN/1.73 M^2
EST. GFR  (AFRICAN AMERICAN): 39.4 ML/MIN/1.73 M^2
EST. GFR  (AFRICAN AMERICAN): 42.4 ML/MIN/1.73 M^2
EST. GFR  (NON AFRICAN AMERICAN): 34.2 ML/MIN/1.73 M^2
EST. GFR  (NON AFRICAN AMERICAN): 34.2 ML/MIN/1.73 M^2
EST. GFR  (NON AFRICAN AMERICAN): 36.8 ML/MIN/1.73 M^2
FLOW: 15
GLUCOSE SERPL-MCNC: 118 MG/DL (ref 70–110)
GLUCOSE SERPL-MCNC: 128 MG/DL (ref 70–110)
GLUCOSE SERPL-MCNC: 139 MG/DL (ref 70–110)
HCO3 UR-SCNC: 16.1 MMOL/L (ref 24–28)
HCT VFR BLD AUTO: 27.3 % (ref 37–48.5)
HCT VFR BLD AUTO: 30.6 % (ref 37–48.5)
HGB BLD-MCNC: 8.6 G/DL (ref 12–16)
HGB BLD-MCNC: 9.5 G/DL (ref 12–16)
HYPOCHROMIA BLD QL SMEAR: ABNORMAL
IMM GRANULOCYTES # BLD AUTO: 0.11 K/UL (ref 0–0.04)
IMM GRANULOCYTES # BLD AUTO: 0.31 K/UL (ref 0–0.04)
IMM GRANULOCYTES NFR BLD AUTO: 0.9 % (ref 0–0.5)
IMM GRANULOCYTES NFR BLD AUTO: 1.5 % (ref 0–0.5)
INR PPP: 1.2 (ref 0.8–1.2)
LACTATE SERPL-SCNC: 1.9 MMOL/L (ref 0.5–2.2)
LYMPHOCYTES # BLD AUTO: 0.7 K/UL (ref 1–4.8)
LYMPHOCYTES # BLD AUTO: 0.8 K/UL (ref 1–4.8)
LYMPHOCYTES NFR BLD: 3.8 % (ref 18–48)
LYMPHOCYTES NFR BLD: 5.3 % (ref 18–48)
MAGNESIUM SERPL-MCNC: 2.2 MG/DL (ref 1.6–2.6)
MCH RBC QN AUTO: 29.1 PG (ref 27–31)
MCH RBC QN AUTO: 29.3 PG (ref 27–31)
MCHC RBC AUTO-ENTMCNC: 31 G/DL (ref 32–36)
MCHC RBC AUTO-ENTMCNC: 31.5 G/DL (ref 32–36)
MCV RBC AUTO: 93 FL (ref 82–98)
MCV RBC AUTO: 94 FL (ref 82–98)
MODE: ABNORMAL
MONOCYTES # BLD AUTO: 0.5 K/UL (ref 0.3–1)
MONOCYTES # BLD AUTO: 0.8 K/UL (ref 0.3–1)
MONOCYTES NFR BLD: 3.8 % (ref 4–15)
MONOCYTES NFR BLD: 4.1 % (ref 4–15)
NEUTROPHILS # BLD AUTO: 11.2 K/UL (ref 1.8–7.7)
NEUTROPHILS # BLD AUTO: 18.1 K/UL (ref 1.8–7.7)
NEUTROPHILS NFR BLD: 88.9 % (ref 38–73)
NEUTROPHILS NFR BLD: 90 % (ref 38–73)
NRBC BLD-RTO: 0 /100 WBC
NRBC BLD-RTO: 0 /100 WBC
OVALOCYTES BLD QL SMEAR: ABNORMAL
OVALOCYTES BLD QL SMEAR: ABNORMAL
PCO2 BLDA: 21.6 MMHG (ref 35–45)
PH SMN: 7.48 [PH] (ref 7.35–7.45)
PHOSPHATE SERPL-MCNC: 3.9 MG/DL (ref 2.7–4.5)
PLATELET # BLD AUTO: 160 K/UL (ref 150–350)
PLATELET # BLD AUTO: 221 K/UL (ref 150–350)
PMV BLD AUTO: 11.3 FL (ref 9.2–12.9)
PMV BLD AUTO: 11.5 FL (ref 9.2–12.9)
PO2 BLDA: 65 MMHG (ref 80–100)
POC BE: -7 MMOL/L
POC SATURATED O2: 95 % (ref 95–100)
POC TCO2: 17 MMOL/L (ref 23–27)
POCT GLUCOSE: 116 MG/DL (ref 70–110)
POCT GLUCOSE: 120 MG/DL (ref 70–110)
POCT GLUCOSE: 140 MG/DL (ref 70–110)
POIKILOCYTOSIS BLD QL SMEAR: SLIGHT
POIKILOCYTOSIS BLD QL SMEAR: SLIGHT
POLYCHROMASIA BLD QL SMEAR: ABNORMAL
POTASSIUM SERPL-SCNC: 4.7 MMOL/L (ref 3.5–5.1)
POTASSIUM SERPL-SCNC: 4.7 MMOL/L (ref 3.5–5.1)
POTASSIUM SERPL-SCNC: 4.8 MMOL/L (ref 3.5–5.1)
PROT SERPL-MCNC: 5.1 G/DL (ref 6–8.4)
PROT SERPL-MCNC: 5.7 G/DL (ref 6–8.4)
PROTHROMBIN TIME: 12.7 SEC (ref 9–12.5)
RBC # BLD AUTO: 2.94 M/UL (ref 4–5.4)
RBC # BLD AUTO: 3.26 M/UL (ref 4–5.4)
SAMPLE: ABNORMAL
SARS-COV-2 RDRP RESP QL NAA+PROBE: NEGATIVE
SITE: ABNORMAL
SODIUM SERPL-SCNC: 145 MMOL/L (ref 136–145)
SODIUM SERPL-SCNC: 146 MMOL/L (ref 136–145)
SODIUM SERPL-SCNC: 147 MMOL/L (ref 136–145)
TACROLIMUS BLD-MCNC: 4.2 NG/ML (ref 5–15)
TARGETS BLD QL SMEAR: ABNORMAL
TOXIC GRANULES BLD QL SMEAR: PRESENT
TROPONIN I SERPL DL<=0.01 NG/ML-MCNC: 0.03 NG/ML (ref 0–0.03)
WBC # BLD AUTO: 12.54 K/UL (ref 3.9–12.7)
WBC # BLD AUTO: 20.17 K/UL (ref 3.9–12.7)

## 2020-09-18 PROCEDURE — 84100 ASSAY OF PHOSPHORUS: CPT

## 2020-09-18 PROCEDURE — 25000003 PHARM REV CODE 250: Performed by: SURGERY

## 2020-09-18 PROCEDURE — D9220A PRA ANESTHESIA: Mod: CRNA,,, | Performed by: NURSE ANESTHETIST, CERTIFIED REGISTERED

## 2020-09-18 PROCEDURE — 36600 WITHDRAWAL OF ARTERIAL BLOOD: CPT

## 2020-09-18 PROCEDURE — 99291 CRITICAL CARE FIRST HOUR: CPT | Mod: 24,57,GC, | Performed by: SURGERY

## 2020-09-18 PROCEDURE — 85610 PROTHROMBIN TIME: CPT

## 2020-09-18 PROCEDURE — D9220A PRA ANESTHESIA: Mod: ANES,,, | Performed by: ANESTHESIOLOGY

## 2020-09-18 PROCEDURE — 37000008 HC ANESTHESIA 1ST 15 MINUTES: Performed by: SURGERY

## 2020-09-18 PROCEDURE — 99900035 HC TECH TIME PER 15 MIN (STAT)

## 2020-09-18 PROCEDURE — 80053 COMPREHEN METABOLIC PANEL: CPT | Mod: 91

## 2020-09-18 PROCEDURE — 36000707: Performed by: SURGERY

## 2020-09-18 PROCEDURE — D9220A PRA ANESTHESIA: ICD-10-PCS | Mod: CRNA,,, | Performed by: NURSE ANESTHETIST, CERTIFIED REGISTERED

## 2020-09-18 PROCEDURE — 99291 PR CRITICAL CARE, E/M 30-74 MINUTES: ICD-10-PCS | Mod: 24,57,GC, | Performed by: SURGERY

## 2020-09-18 PROCEDURE — 43840 GSTRRPHY SUTR DUOL/GSTR ULCR: CPT | Mod: 58,,, | Performed by: SURGERY

## 2020-09-18 PROCEDURE — 25000003 PHARM REV CODE 250: Performed by: STUDENT IN AN ORGANIZED HEALTH CARE EDUCATION/TRAINING PROGRAM

## 2020-09-18 PROCEDURE — D9220A PRA ANESTHESIA: ICD-10-PCS | Mod: ANES,,, | Performed by: ANESTHESIOLOGY

## 2020-09-18 PROCEDURE — 99900026 HC AIRWAY MAINTENANCE (STAT)

## 2020-09-18 PROCEDURE — 85730 THROMBOPLASTIN TIME PARTIAL: CPT

## 2020-09-18 PROCEDURE — 97606 PR NEG PRESS WOUND THERAPY (NPWT) W/NON-DISPOSABLE WOUND VAC DEVICE (DME), >=50 CM: ICD-10-PCS | Mod: ,,, | Performed by: SURGERY

## 2020-09-18 PROCEDURE — 43840 PR REPAIR, PERF DUOD/GAST ULC-WND/INJ: ICD-10-PCS | Mod: 58,,, | Performed by: SURGERY

## 2020-09-18 PROCEDURE — 87106 FUNGI IDENTIFICATION YEAST: CPT

## 2020-09-18 PROCEDURE — 63600175 PHARM REV CODE 636 W HCPCS: Performed by: NURSE ANESTHETIST, CERTIFIED REGISTERED

## 2020-09-18 PROCEDURE — 49020 PR DRAIN ABD ABSCESS OPEN: ICD-10-PCS | Mod: 58,59,, | Performed by: SURGERY

## 2020-09-18 PROCEDURE — 63600175 PHARM REV CODE 636 W HCPCS: Performed by: STUDENT IN AN ORGANIZED HEALTH CARE EDUCATION/TRAINING PROGRAM

## 2020-09-18 PROCEDURE — 87186 SC STD MICRODIL/AGAR DIL: CPT

## 2020-09-18 PROCEDURE — 31720 CLEARANCE OF AIRWAYS: CPT

## 2020-09-18 PROCEDURE — A4217 STERILE WATER/SALINE, 500 ML: HCPCS | Performed by: STUDENT IN AN ORGANIZED HEALTH CARE EDUCATION/TRAINING PROGRAM

## 2020-09-18 PROCEDURE — 36000706: Performed by: SURGERY

## 2020-09-18 PROCEDURE — 84484 ASSAY OF TROPONIN QUANT: CPT

## 2020-09-18 PROCEDURE — 63600175 PHARM REV CODE 636 W HCPCS: Performed by: SURGERY

## 2020-09-18 PROCEDURE — 87070 CULTURE OTHR SPECIMN AEROBIC: CPT

## 2020-09-18 PROCEDURE — 85025 COMPLETE CBC W/AUTO DIFF WBC: CPT | Mod: 91

## 2020-09-18 PROCEDURE — 25500020 PHARM REV CODE 255: Performed by: STUDENT IN AN ORGANIZED HEALTH CARE EDUCATION/TRAINING PROGRAM

## 2020-09-18 PROCEDURE — 80048 BASIC METABOLIC PNL TOTAL CA: CPT

## 2020-09-18 PROCEDURE — 80053 COMPREHEN METABOLIC PANEL: CPT

## 2020-09-18 PROCEDURE — 37000009 HC ANESTHESIA EA ADD 15 MINS: Performed by: SURGERY

## 2020-09-18 PROCEDURE — 82803 BLOOD GASES ANY COMBINATION: CPT

## 2020-09-18 PROCEDURE — C1729 CATH, DRAINAGE: HCPCS | Performed by: SURGERY

## 2020-09-18 PROCEDURE — 82140 ASSAY OF AMMONIA: CPT

## 2020-09-18 PROCEDURE — 83605 ASSAY OF LACTIC ACID: CPT

## 2020-09-18 PROCEDURE — 87102 FUNGUS ISOLATION CULTURE: CPT

## 2020-09-18 PROCEDURE — 83735 ASSAY OF MAGNESIUM: CPT

## 2020-09-18 PROCEDURE — 97606 NEG PRS WND THER DME>50 SQCM: CPT | Mod: ,,, | Performed by: SURGERY

## 2020-09-18 PROCEDURE — 49020 DRAINAGE ABDOM ABSCESS OPEN: CPT | Mod: 58,59,, | Performed by: SURGERY

## 2020-09-18 PROCEDURE — B4185 PARENTERAL SOL 10 GM LIPIDS: HCPCS | Performed by: STUDENT IN AN ORGANIZED HEALTH CARE EDUCATION/TRAINING PROGRAM

## 2020-09-18 PROCEDURE — 27100171 HC OXYGEN HIGH FLOW UP TO 24 HOURS

## 2020-09-18 PROCEDURE — 87075 CULTR BACTERIA EXCEPT BLOOD: CPT

## 2020-09-18 PROCEDURE — 80197 ASSAY OF TACROLIMUS: CPT

## 2020-09-18 PROCEDURE — U0002 COVID-19 LAB TEST NON-CDC: HCPCS

## 2020-09-18 PROCEDURE — 87076 CULTURE ANAEROBE IDENT EACH: CPT

## 2020-09-18 PROCEDURE — 87077 CULTURE AEROBIC IDENTIFY: CPT

## 2020-09-18 PROCEDURE — 27000221 HC OXYGEN, UP TO 24 HOURS

## 2020-09-18 PROCEDURE — 20000000 HC ICU ROOM

## 2020-09-18 PROCEDURE — 94002 VENT MGMT INPAT INIT DAY: CPT

## 2020-09-18 PROCEDURE — 94761 N-INVAS EAR/PLS OXIMETRY MLT: CPT

## 2020-09-18 PROCEDURE — 25000003 PHARM REV CODE 250: Performed by: NURSE ANESTHETIST, CERTIFIED REGISTERED

## 2020-09-18 RX ORDER — MIDAZOLAM HYDROCHLORIDE 1 MG/ML
INJECTION, SOLUTION INTRAMUSCULAR; INTRAVENOUS
Status: DISCONTINUED | OUTPATIENT
Start: 2020-09-18 | End: 2020-09-18

## 2020-09-18 RX ORDER — PANTOPRAZOLE SODIUM 40 MG/10ML
40 INJECTION, POWDER, LYOPHILIZED, FOR SOLUTION INTRAVENOUS DAILY
Status: DISCONTINUED | OUTPATIENT
Start: 2020-09-19 | End: 2020-10-14 | Stop reason: HOSPADM

## 2020-09-18 RX ORDER — PHENYLEPHRINE HCL IN 0.9% NACL 1 MG/10 ML
SYRINGE (ML) INTRAVENOUS
Status: DISCONTINUED | OUTPATIENT
Start: 2020-09-18 | End: 2020-09-18

## 2020-09-18 RX ORDER — ROCURONIUM BROMIDE 10 MG/ML
INJECTION, SOLUTION INTRAVENOUS
Status: DISCONTINUED | OUTPATIENT
Start: 2020-09-18 | End: 2020-09-18

## 2020-09-18 RX ORDER — PROPOFOL 10 MG/ML
20 INJECTION, EMULSION INTRAVENOUS CONTINUOUS PRN
Status: DISCONTINUED | OUTPATIENT
Start: 2020-09-18 | End: 2020-09-21

## 2020-09-18 RX ORDER — TACROLIMUS 0.5 MG/1
0.5 CAPSULE ORAL EVERY MORNING
Status: DISCONTINUED | OUTPATIENT
Start: 2020-09-19 | End: 2020-09-21

## 2020-09-18 RX ORDER — LIDOCAINE HYDROCHLORIDE 20 MG/ML
INJECTION INTRAVENOUS
Status: DISCONTINUED | OUTPATIENT
Start: 2020-09-18 | End: 2020-09-18

## 2020-09-18 RX ORDER — PROPOFOL 10 MG/ML
VIAL (ML) INTRAVENOUS
Status: DISCONTINUED | OUTPATIENT
Start: 2020-09-18 | End: 2020-09-18

## 2020-09-18 RX ORDER — FLUCONAZOLE 2 MG/ML
200 INJECTION, SOLUTION INTRAVENOUS DAILY
Status: DISCONTINUED | OUTPATIENT
Start: 2020-09-18 | End: 2020-09-18

## 2020-09-18 RX ORDER — IPRATROPIUM BROMIDE AND ALBUTEROL SULFATE 2.5; .5 MG/3ML; MG/3ML
3 SOLUTION RESPIRATORY (INHALATION) EVERY 4 HOURS PRN
Status: DISCONTINUED | OUTPATIENT
Start: 2020-09-18 | End: 2020-10-14 | Stop reason: HOSPADM

## 2020-09-18 RX ORDER — KETAMINE HCL IN 0.9 % NACL 50 MG/5 ML
SYRINGE (ML) INTRAVENOUS
Status: DISCONTINUED | OUTPATIENT
Start: 2020-09-18 | End: 2020-09-18

## 2020-09-18 RX ORDER — FENTANYL CITRATE 50 UG/ML
INJECTION, SOLUTION INTRAMUSCULAR; INTRAVENOUS
Status: DISCONTINUED | OUTPATIENT
Start: 2020-09-18 | End: 2020-09-18

## 2020-09-18 RX ORDER — SUCCINYLCHOLINE CHLORIDE 20 MG/ML
INJECTION INTRAMUSCULAR; INTRAVENOUS
Status: DISCONTINUED | OUTPATIENT
Start: 2020-09-18 | End: 2020-09-18

## 2020-09-18 RX ORDER — ONDANSETRON 2 MG/ML
INJECTION INTRAMUSCULAR; INTRAVENOUS
Status: DISCONTINUED | OUTPATIENT
Start: 2020-09-18 | End: 2020-09-18

## 2020-09-18 RX ORDER — DEXMEDETOMIDINE HYDROCHLORIDE 4 UG/ML
0.2 INJECTION, SOLUTION INTRAVENOUS CONTINUOUS
Status: DISCONTINUED | OUTPATIENT
Start: 2020-09-18 | End: 2020-09-24

## 2020-09-18 RX ORDER — FLUCONAZOLE 2 MG/ML
400 INJECTION, SOLUTION INTRAVENOUS
Status: DISCONTINUED | OUTPATIENT
Start: 2020-09-18 | End: 2020-09-18

## 2020-09-18 RX ORDER — FLUCONAZOLE 2 MG/ML
200 INJECTION, SOLUTION INTRAVENOUS
Status: DISCONTINUED | OUTPATIENT
Start: 2020-09-18 | End: 2020-09-23

## 2020-09-18 RX ORDER — NOREPINEPHRINE BITARTRATE/D5W 4MG/250ML
0.02 PLASTIC BAG, INJECTION (ML) INTRAVENOUS CONTINUOUS
Status: DISCONTINUED | OUTPATIENT
Start: 2020-09-18 | End: 2020-09-22

## 2020-09-18 RX ORDER — BISACODYL 10 MG
10 SUPPOSITORY, RECTAL RECTAL ONCE
Status: COMPLETED | OUTPATIENT
Start: 2020-09-18 | End: 2020-09-18

## 2020-09-18 RX ORDER — PHENYLEPHRINE HYDROCHLORIDE 10 MG/ML
INJECTION INTRAVENOUS
Status: DISCONTINUED | OUTPATIENT
Start: 2020-09-18 | End: 2020-09-18

## 2020-09-18 RX ADMIN — SODIUM CHLORIDE, SODIUM GLUCONATE, SODIUM ACETATE, POTASSIUM CHLORIDE, MAGNESIUM CHLORIDE, SODIUM PHOSPHATE, DIBASIC, AND POTASSIUM PHOSPHATE: .53; .5; .37; .037; .03; .012; .00082 INJECTION, SOLUTION INTRAVENOUS at 03:09

## 2020-09-18 RX ADMIN — LEVOTHYROXINE SODIUM 75 MCG: 75 TABLET ORAL at 08:09

## 2020-09-18 RX ADMIN — Medication 100 MCG: at 03:09

## 2020-09-18 RX ADMIN — IOHEXOL 15 ML: 350 INJECTION, SOLUTION INTRAVENOUS at 12:09

## 2020-09-18 RX ADMIN — FLUCONAZOLE, SODIUM CHLORIDE 200 MG: 2 INJECTION INTRAVENOUS at 02:09

## 2020-09-18 RX ADMIN — MAGNESIUM SULFATE HEPTAHYDRATE 40 ML/HR: 500 INJECTION, SOLUTION INTRAMUSCULAR; INTRAVENOUS at 02:09

## 2020-09-18 RX ADMIN — DEXMEDETOMIDINE HYDROCHLORIDE 0.2 MCG/KG/HR: 4 INJECTION, SOLUTION INTRAVENOUS at 05:09

## 2020-09-18 RX ADMIN — NOREPINEPHRINE BITARTRATE 0.05 MCG/KG/MIN: 1 INJECTION, SOLUTION, CONCENTRATE INTRAVENOUS at 03:09

## 2020-09-18 RX ADMIN — FENTANYL CITRATE 100 MCG: 50 INJECTION, SOLUTION INTRAMUSCULAR; INTRAVENOUS at 03:09

## 2020-09-18 RX ADMIN — HEPARIN SODIUM 5000 UNITS: 5000 INJECTION INTRAVENOUS; SUBCUTANEOUS at 10:09

## 2020-09-18 RX ADMIN — MIDAZOLAM HYDROCHLORIDE 2 MG: 1 INJECTION, SOLUTION INTRAMUSCULAR; INTRAVENOUS at 04:09

## 2020-09-18 RX ADMIN — PROPOFOL 70 MG: 10 INJECTION, EMULSION INTRAVENOUS at 03:09

## 2020-09-18 RX ADMIN — Medication 25 MG: at 03:09

## 2020-09-18 RX ADMIN — BISACODYL 10 MG: 10 SUPPOSITORY RECTAL at 08:09

## 2020-09-18 RX ADMIN — SODIUM CHLORIDE 1000 ML: 0.9 INJECTION, SOLUTION INTRAVENOUS at 08:09

## 2020-09-18 RX ADMIN — SODIUM CHLORIDE 1000 ML: 0.9 INJECTION, SOLUTION INTRAVENOUS at 01:09

## 2020-09-18 RX ADMIN — SODIUM CHLORIDE, SODIUM LACTATE, POTASSIUM CHLORIDE, AND CALCIUM CHLORIDE 1000 ML: .6; .31; .03; .02 INJECTION, SOLUTION INTRAVENOUS at 11:09

## 2020-09-18 RX ADMIN — ROCURONIUM BROMIDE 20 MG: 10 INJECTION, SOLUTION INTRAVENOUS at 03:09

## 2020-09-18 RX ADMIN — TACROLIMUS 1 MG: 1 CAPSULE, GELATIN COATED ORAL at 08:09

## 2020-09-18 RX ADMIN — PANTOPRAZOLE SODIUM 40 MG: 40 GRANULE, DELAYED RELEASE ORAL at 08:09

## 2020-09-18 RX ADMIN — Medication 400 MCG: at 03:09

## 2020-09-18 RX ADMIN — Medication 0.02 MCG/KG/MIN: at 11:09

## 2020-09-18 RX ADMIN — QUETIAPINE FUMARATE 100 MG: 100 TABLET ORAL at 08:09

## 2020-09-18 RX ADMIN — HEPARIN SODIUM 5000 UNITS: 5000 INJECTION INTRAVENOUS; SUBCUTANEOUS at 05:09

## 2020-09-18 RX ADMIN — I.V. FAT EMULSION 250 ML: 20 EMULSION INTRAVENOUS at 09:09

## 2020-09-18 RX ADMIN — PHENYLEPHRINE HYDROCHLORIDE 100 MCG: 10 INJECTION INTRAVENOUS at 03:09

## 2020-09-18 RX ADMIN — LIDOCAINE HYDROCHLORIDE 100 MG: 20 INJECTION, SOLUTION INTRAVENOUS at 03:09

## 2020-09-18 RX ADMIN — SUCCINYLCHOLINE CHLORIDE 100 MG: 20 INJECTION, SOLUTION INTRAMUSCULAR; INTRAVENOUS at 03:09

## 2020-09-18 RX ADMIN — Medication 300 MCG: at 03:09

## 2020-09-18 RX ADMIN — PIPERACILLIN SODIUM AND TAZOBACTAM SODIUM 4.5 G: 4; .5 INJECTION, POWDER, LYOPHILIZED, FOR SOLUTION INTRAVENOUS at 11:09

## 2020-09-18 RX ADMIN — ONDANSETRON 4 MG: 2 INJECTION, SOLUTION INTRAMUSCULAR; INTRAVENOUS at 04:09

## 2020-09-18 RX ADMIN — PIPERACILLIN SODIUM AND TAZOBACTAM SODIUM 4.5 G: 4; .5 INJECTION, POWDER, LYOPHILIZED, FOR SOLUTION INTRAVENOUS at 02:09

## 2020-09-18 RX ADMIN — MAGNESIUM SULFATE HEPTAHYDRATE: 500 INJECTION, SOLUTION INTRAMUSCULAR; INTRAVENOUS at 09:09

## 2020-09-18 RX ADMIN — PROPOFOL 20 MCG/KG/MIN: 10 INJECTION, EMULSION INTRAVENOUS at 08:09

## 2020-09-18 RX ADMIN — ROCURONIUM BROMIDE 5 MG: 10 INJECTION, SOLUTION INTRAVENOUS at 03:09

## 2020-09-18 NOTE — PROGRESS NOTES
[facility]  General Surgery  Progress Note    Subjective:     History of Present Illness:  Ms. Hernandez is a 51yo F w/PMH of von Gierke disease s/p liver transplant (2002, on tacro + MMF, follows Dr. Nielsen), hx chronic rejection (bx 2015 with acute and chronic rejection s/p steroids), biliary stricture and ductopenic rejection, recently with cirrhosis on fibroscan (10/2019), HTN, and depression who presents as a transfer for further evaluation of gastric outlet obstruction and esophageal stricturing.  Patient decompensated requiring multiple pressors and intubation. CT scan showed free air. Prior to intubation patient reported severe abdominal pain.   states that patient has had epigastric pain, nausea, and vomiting for several days.     Post-Op Info:  Procedure(s) (LRB):  LAPAROTOMY, EXPLORATORY with abd closure (N/A)   12 Days Post-Op     Interval History: Patient on Comfort Flow 30L/70%. Continues to have drainage around G tube site    Medications:  Continuous Infusions:   TPN ADULT CENTRAL LINE CUSTOM 40 mL/hr at 09/18/20 0800     Scheduled Meds:   acetaminophen  500 mg Per G Tube Q6H    bisacodyL  10 mg Rectal Once    fat emulsion 20%  250 mL Intravenous Daily    heparin (porcine)  5,000 Units Subcutaneous Q8H    levothyroxine  75 mcg Per G Tube Daily    melatonin  6 mg Per G Tube Nightly    pantoprazole  40 mg Per G Tube Daily    QUEtiapine  100 mg Per G Tube BID    sodium chloride 0.9% bolus  1,000 mL Intravenous Once    tacrolimus  1 mg Per G Tube Daily AM     PRN Meds:sodium chloride, albuterol-ipratropium, albuterol-ipratropium, calcium gluconate IVPB, calcium gluconate IVPB, calcium gluconate IVPB, dextrose 50%, dextrose 50%, glucagon (human recombinant), glucose, glucose, haloperidol lactate, hydrALAZINE, insulin aspart U-100, labetaloL, magnesium sulfate IVPB, magnesium sulfate IVPB, ondansetron, potassium chloride in water **AND** potassium chloride in water  **AND** potassium chloride in water, promethazine, sodium chloride 0.9%, sodium chloride 0.9%, sodium phosphate IVPB, sodium phosphate IVPB, sodium phosphate IVPB     Review of patient's allergies indicates:   Allergen Reactions    Codeine Itching     Other reaction(s): Itching    Lipitor [atorvastatin] Other (See Comments)     Other reaction(s): Muscle pain  Muscle cranmps    Morphine Itching     Other reaction(s): nausea and vomiting     Zoloft [sertraline] Other (See Comments)     Tremors/muscle spasms     Objective:     Vital Signs (Most Recent):  Temp: 98.7 °F (37.1 °C) (09/18/20 0700)  Pulse: 104 (09/18/20 0800)  Resp: (!) 24 (09/18/20 0800)  BP: (!) 110/56 (09/18/20 0800)  SpO2: (!) 93 % (09/18/20 0800) Vital Signs (24h Range):  Temp:  [97.2 °F (36.2 °C)-99.5 °F (37.5 °C)] 98.7 °F (37.1 °C)  Pulse:  [] 104  Resp:  [24-55] 24  SpO2:  [85 %-98 %] 93 %  BP: ()/(46-91) 110/56     Weight: 68 kg (149 lb 14.6 oz)  Body mass index is 30.28 kg/m².    Intake/Output - Last 3 Shifts       09/16 0700 - 09/17 0659 09/17 0700 - 09/18 0659 09/18 0700 - 09/19 0659    I.V. (mL/kg) 903 (13.3) 547 (8)     Blood       NG/GT 1615 870     IV Piggyback  1000      1146     Total Intake(mL/kg) 3469 (51) 3563 (52.4)     Urine (mL/kg/hr) 1540 (0.9) 785 (0.5) 45 (0.5)    Drains  800     Stool 0      Total Output 1540 1585 45    Net +1929 +1978 -45           Stool Occurrence 2 x            Physical Exam  Constitutional:       General: She is not in acute distress.  Cardiovascular:      Rate and Rhythm: Regular rhythm. Tachycardia present.   Pulmonary:      Effort: Pulmonary effort is normal. No respiratory distress.   Abdominal:      General: There is no distension.      Palpations: Abdomen is soft.      Tenderness: There is no abdominal tenderness.      Comments: Incision c/d/i  Thick drainage around G tube   Neurological:      General: No focal deficit present.      Mental Status: She is alert and oriented to  person, place, and time.   Psychiatric:         Mood and Affect: Mood normal.         Behavior: Behavior normal.         Significant Labs:  CBC:   Recent Labs   Lab 09/18/20  0300   WBC 12.54   RBC 2.94*   HGB 8.6*   HCT 27.3*      MCV 93   MCH 29.3   MCHC 31.5*     CMP:   Recent Labs   Lab 09/18/20  0300   *   CALCIUM 8.5*   ALBUMIN 1.3*   PROT 5.1*   *   K 4.7   CO2 19*   *   BUN 65*   CREATININE 1.7*   ALKPHOS 232*   ALT 36   AST 40   BILITOT 3.0*       Significant Diagnostics:  I have reviewed all pertinent imaging results/findings within the past 24 hours.    Assessment/Plan:     * Perforated abdominal viscus  50 y.o. female w/ free air on CT scan. S/p emergent ex lap on 9/4 w/ findings of gastric perforation, primary repair. Now 12 Days Post-Op from abdominal closure on 9/6, skin stapled closed, KERRY drains removed.     Continue SICU care  Tube in good position.  Continue tube feeds. Obtain tube study if concerned that tube feeds are leaking around G tube site.  Zosyn and diflucan for suspected intraabodminal source, WBC improving  Discontinue step down orders on comfort flow 30L/70%  Recheck ammonia level          Rocio Hidalgo MD  General Surgery  [facility]

## 2020-09-18 NOTE — SUBJECTIVE & OBJECTIVE
Interval History/Significant Events: possible fall out of bed overnight, unwitnessed, per patient she sat on floor. TF leaking around G tube, paused and to tube to gravity, 800cc out. Increasing O2 requirement overnight, now on comfortflo 70%.     Follow-up For: Procedure(s) (LRB):  LAPAROTOMY, EXPLORATORY with abd closure (N/A)    Post-Operative Day: 12 Days Post-Op    Objective:     Vital Signs (Most Recent):  Temp: 99.5 °F (37.5 °C) (09/18/20 0300)  Pulse: 108 (09/18/20 0630)  Resp: (!) 24 (09/18/20 0630)  BP: (!) 99/53 (09/18/20 0630)  SpO2: (!) 93 % (09/18/20 0630) Vital Signs (24h Range):  Temp:  [97.2 °F (36.2 °C)-99.5 °F (37.5 °C)] 99.5 °F (37.5 °C)  Pulse:  [] 108  Resp:  [24-55] 24  SpO2:  [85 %-98 %] 93 %  BP: ()/(49-91) 99/53     Weight: 68 kg (149 lb 14.6 oz)  Body mass index is 30.28 kg/m².      Intake/Output Summary (Last 24 hours) at 9/18/2020 0656  Last data filed at 9/18/2020 0600  Gross per 24 hour   Intake 3563 ml   Output 1585 ml   Net 1978 ml       Physical Exam  Constitutional:       General: She is not in acute distress.  Cardiovascular:      Rate and Rhythm: Regular rhythm. Tachycardia present.   Pulmonary:      Effort: Pulmonary effort is normal. No respiratory distress.   Abdominal:      General: There is no distension.      Palpations: Abdomen is soft.      Tenderness: There is no abdominal tenderness.      Comments: Incision c/d/i  Thick drainage around G tube   Neurological:      General: No focal deficit present.      Mental Status: She is alert and oriented to person, place, and time.   Psychiatric:         Mood and Affect: Mood normal.         Behavior: Behavior normal.         Vents:  Vent Mode: Spont/T (09/08/20 1607)  Ventilator Initiated: Yes (09/04/20 1130)  Set Rate: 14 BPM (09/08/20 1142)  Vt Set: 330 mL (09/08/20 1142)  Pressure Support: 5 cmH20 (09/08/20 1607)  PEEP/CPAP: 5 cmH20 (09/08/20 1607)  Oxygen Concentration (%): 70 (09/18/20 0600)  Peak Airway Pressure:  21 cmH2O (09/08/20 1142)  Plateau Pressure: 14 cmH20 (09/08/20 0850)  Total Ve: 4.54 mL (09/08/20 1142)  Negative Inspiratory Force (cm H2O): -35 (09/08/20 1607)  F/VT Ratio<105 (RSBI): (!) 43.21 (09/08/20 1142)    Lines/Drains/Airways     Central Venous Catheter Line            Percutaneous Central Line Insertion/Assessment - Triple Lumen  09/04/20 0800 right internal jugular 13 days          Drain                 Gastrostomy/Enterostomy 09/04/20 LUQ decompression 14 days         Urethral Catheter 09/14/20 1330 3 days                Significant Labs:    CBC/Anemia Profile:  Recent Labs   Lab 09/17/20  0300 09/18/20  0013 09/18/20  0300   WBC 14.32* 20.17* 12.54   HGB 9.5* 9.5* 8.6*   HCT 31.1* 30.6* 27.3*    221 160   MCV 93 94 93   RDW 17.9* 18.1* 18.1*        Chemistries:  Recent Labs   Lab 09/17/20  0300 09/18/20  0013 09/18/20  0300   * 147* 146*   K 3.7 4.8 4.7   * 115* 115*   CO2 21* 19* 19*   BUN 48* 59* 65*   CREATININE 1.2 1.6* 1.7*   CALCIUM 8.3* 9.1 8.5*   ALBUMIN 1.5* 1.4* 1.3*   PROT 5.6* 5.7* 5.1*   BILITOT 3.4* 3.5* 3.0*   ALKPHOS 211* 254* 232*   ALT 44 37 36   AST 42* 41* 40   MG 1.9  --  2.2   PHOS 3.1  --  3.9       All pertinent labs within the past 24 hours have been reviewed.    Significant Imaging:  I have reviewed and interpreted all pertinent imaging results/findings within the past 24 hours.

## 2020-09-18 NOTE — TRANSFER OF CARE
"Anesthesia Transfer of Care Note    Patient: Elda Hernandez    Procedure(s) Performed: Procedure(s) (LRB):  LAPAROTOMY, EXPLORATORY, DRAINAGE OF ABSCESS, REPAIR OF GASTRIC PERFORATION, GASTROSTOMY TUBE PLACEMENT (N/A)  APPLICATION, WOUND VAC (N/A)    Patient location: ICU    Anesthesia Type: general    Transport from OR: Upon arrival to PACU/ICU, patient attached to ventilator and auscultated to confirm bilateral breath sounds and adequate TV. Transported from OR intubated on 100% O2 by AMBU with adequate controlled ventilation. Continuous ECG monitoring in transport. Continuous SpO2 monitoring in transport. Continuos invasive BP monitoring in transport    Post pain: adequate analgesia    Post assessment: no apparent anesthetic complications and tolerated procedure well    Post vital signs: stable    Level of consciousness: sedated and responds to stimulation    Nausea/Vomiting: no nausea/vomiting    Complications: none    Transfer of care protocol was followed      Last vitals:   Visit Vitals  BP (!) 107/54   Pulse 105   Temp 36.9 °C (98.4 °F) (Oral)   Resp (!) 43   Ht 4' 11" (1.499 m)   Wt 68 kg (149 lb 14.6 oz)   SpO2 95%   BMI 30.28 kg/m²     "

## 2020-09-18 NOTE — NURSING
2350: MD Patric notified of increased drainage from G tube site resembling tube feeds. TF turned off. No new orders at this time.    0000: Pt SpO2 decreased to 89%. Unable to get accurate waveform, pulse ox site and probe changed. Sats now showing 84-86%. 5L NC applied. Sats remained 87%. Pt placed on 10L high flow NC, sats 88%. MD Patric called to bedside. ABG and STAT CXR ordered. G tube placed to gravity, copious residuals draining.     0100: Pt placed on comfort flow 30L, 70%, sats>93%.     0115: MD Bandar notified of UOP dec to 5cc last hour. 1L NS ordered.     0130: Pt attempted to remove comfort flow multiple times. Nonviolent restraints applied. Will continue to monitor.

## 2020-09-18 NOTE — ASSESSMENT & PLAN NOTE
50 y.o. female w/ free air on CT scan. S/p emergent ex lap on 9/4 w/ findings of gastric perforation, primary repair. Now 12 Days Post-Op from abdominal closure on 9/6, skin stapled closed, KERRY drains removed.     Continue SICU care  Tube in good position.  Continue tube feeds. Obtain tube study if concerned that tube feeds are leaking around G tube site.  Zosyn and diflucan for suspected intraabodminal source, WBC improving  Discontinue step down orders on comfort flow 30L/70%  Recheck ammonia level

## 2020-09-18 NOTE — PT/OT/SLP PROGRESS
Occupational Therapy      Patient Name:  Elda Hernandez   MRN:  [id number]    Patient not seen today secondary to increased respiratory requirements and pressors with possible fall out of bed overnight  . Will follow-up 9/21/20.    PAULINE Geronimo  9/18/2020

## 2020-09-18 NOTE — OP NOTE
DATE: 9/18/2020.    PREOPERATIVE DIAGNOSIS:  Gastric leak.    POSTOPERATIVE DIAGNOSIS:   1.  Gastric leak.  2.  Intra-abdominal abscess.    PROCEDURE:   1.  Exploratory laparotomy.  2.  Repair of gastric perforation.  3.  Drainage of intra-abdominal abscess.  4.  [place] gastrostomy tube placement.  5.  Negative pressure wound therapy with wound VAC placement (>50 sq cm).    ATTENDING SURGEON: Zain Decker MD.    RESIDENT:  Jhony Chappell MD.    ANESTHESIA:  General.    INDICATION:  Patient is a 50-year-old female currently admitted to the surgical ICU.  CT scan showed significant contrast extravasation from the stomach with a large intra-abdominal abscess.  We recommended emergent laparotomy for exploration.    PROCEDURE IN DETAIL:  Patient was taken to the operating room and placed supine.  After induction of general endotracheal tube anesthesia, her abdomen was prepped and draped in the standard fashion.  Time-out was performed.  Existing staples removed and fascial stitch was cut.  We immediately encountered a large volume of purulent material which was evacuated, 800 mL in total.  It intra-abdominal adhesions around the stomach were carefully taken down with blunt dissection and the abdomen was explored.  The existing gastrostomy tube was within the stomach, however there was a defect lateral to the tube leaking gastric contents.  This was repaired with interrupted silk suture.  The existing gastrostomy tube was removed and a larger mallacot drain was passed through the left upper quadrant abdominal wall and placed within the defect.  The friability of the stomach did not allow for a pursestring suture to be placed, however there was no evidence of leaking around the tube following placement.  The stomach was then secured to the anterior abdominal wall with interrupted 2-0 silk suture to allow good seal around the tube.  Hemostasis was confirmed.  Abdomen was generously irrigated with multiple liters of saline  until clear.  Two 19 Ecuadorean Adriano drains were placed through the left upper quadrant for drainage of the intra-abdominal abscess cavity and were secured and connected to bulb suction.  Fascia was closed with running 0 looped PDS suture.  Black wound VAC sponge was cut to size and placed within the subcutaneous wound.  Dressing was applied and was connected to suction with good seal.  Patient was then transferred to the ICU intubated, in guarded condition.  All needle and sponge counts were correct at the conclusion of the case.  I was present for the procedure in its entirety.    EBL: 10 mL.    FINDINGS: 800 purulent fluid evacuated from the left upper quadrant.  Small gastric leak repaired primarily.  Gastrostomy tube up sized to 26 Ecuadorean Mallacot drain.    COMPLICATIONS: None.

## 2020-09-18 NOTE — PLAN OF CARE
SW is following this Pt for DC planning needs. Discharge Disposition: SNF - pending patient progress; SW awaits patient to be more oriented to review discharge planning and safety.    SW will continue to coordinate with patient, family, team and insurance to complete patient's discharge plan.    Tigist Moody LMSW   - Case Management
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
## 2020-09-18 NOTE — PT/OT/SLP PROGRESS
Physical Therapy      Patient Name:  Elda Hernandez   MRN:  [id number]    Patient not seen today. Pt with increased respiratory requirements and pressor support. Will follow-up when appropriate.    Kamila Rowley, PT, DPT  9/18/2020  [phone]

## 2020-09-18 NOTE — PLAN OF CARE
Pt vss. T max 99.5. , ST. MAP>65. Comfort Flow 30L/70%, sats>91%. UOP 5-60cc/hr- 1 L NS given for dec UOP, pt responded well. TF turned off due to possible aspiration, G tube to gravity w 800cc output- see previous note. TPN infusing @ 40cc/hr. No BM overnight. Pt oriented to self and continuously mumbling incoherent words. Intermittently agitated throughout shift, PRN haldol given once. Labs drawn per orders. Plan of care reviewed with patient, all questions/concers addressed. Will continue to monitor    Skin: No new skin breakdown noted. Tan/yellow (tube feed) drainage around G tube site, cleansed and redressed multiple times throughout shift- MD aware. Mid abdominal incision cleansed and GABY. Bruising to L fem site from previous A line. Foams to all skin tears. Triad cream applied to perineum. Foams to heels and sacrum. SCDs and off loading boots in place. Pt on immerse bed, bed plugged in and mattress inflated.

## 2020-09-18 NOTE — PROGRESS NOTES
[facility]  Critical Care - Surgery  Progress Note    Patient Name: Elda Hernandez  MRN: [id number]  Admission Date: 9/3/2020  Hospital Length of Stay: 15 days  Code Status: Full Code  Attending Provider: Clark Rodriguez MD  Primary Care Provider: Jordana Woods MD   Principal Problem: Perforated abdominal viscus    Subjective:     Hospital/ICU Course:  9/4: Pt admitted to SICU following ex lap for GI perf. Intubated, sedated. Wound vac in place.  Soon after arrival to unit a mottled appearance to RUE was noted.  Vascular consulted and US revealed R radial artery thrombosis.    9/5: Tachycardic..  R arm appearance greatly improved overnight.    9/6: Patient returned to OR for abdominal washout, closure. R arm with dopplerable signals to ulnar artery and palmar arch.   9/7: Attempted to wean patient off of sedation in an effort to move towards extubation. Patient weaned off of propofol. Precedex started. Patient's heart rate very labile and sensitize to sedation.   9/8: patient extubated  9/9: SHANICE. Labetalol PRN for HTN   9/10:  Diuresis increased.  Cr downtrending  9/11: Increased abdominal distension and elevated WBC count. CT abd  9/12: WBC to 45 today. Diflucan and zosyn initiated. Lactulose enema w/ improving mental status  9/13: WBC improved. Mental status improving. FWF initiated for hypernatremia.   9/14: White count continues to improve.  Oriented to person only this morning.    9/17: tolerating tube feeds, half TPN rate today. Possible step down   9/18: possible fall out of bed overnight. High G tube residual, now to gravity. Requiring comfortlfo    Interval History/Significant Events: possible fall out of bed overnight, unwitnessed, per patient she sat on floor. TF leaking around G tube, paused and to tube to gravity, 800cc out. Increasing O2 requirement overnight, now on comfortflo 70%.     Follow-up For: Procedure(s) (LRB):  LAPAROTOMY, EXPLORATORY with abd closure  (N/A)    Post-Operative Day: 12 Days Post-Op    Objective:     Vital Signs (Most Recent):  Temp: 99.5 °F (37.5 °C) (09/18/20 0300)  Pulse: 108 (09/18/20 0630)  Resp: (!) 24 (09/18/20 0630)  BP: (!) 99/53 (09/18/20 0630)  SpO2: (!) 93 % (09/18/20 0630) Vital Signs (24h Range):  Temp:  [97.2 °F (36.2 °C)-99.5 °F (37.5 °C)] 99.5 °F (37.5 °C)  Pulse:  [] 108  Resp:  [24-55] 24  SpO2:  [85 %-98 %] 93 %  BP: ()/(49-91) 99/53     Weight: 68 kg (149 lb 14.6 oz)  Body mass index is 30.28 kg/m².      Intake/Output Summary (Last 24 hours) at 9/18/2020 0656  Last data filed at 9/18/2020 0600  Gross per 24 hour   Intake 3563 ml   Output 1585 ml   Net 1978 ml       Physical Exam  Constitutional:       General: She is not in acute distress.  Cardiovascular:      Rate and Rhythm: Regular rhythm. Tachycardia present.   Pulmonary:      Effort: Pulmonary effort is normal. No respiratory distress.   Abdominal:      General: There is no distension.      Palpations: Abdomen is soft.      Tenderness: There is no abdominal tenderness.      Comments: Incision c/d/i  Thick drainage around G tube   Neurological:      General: No focal deficit present.      Mental Status: She is alert and oriented to person, place, and time.   Psychiatric:         Mood and Affect: Mood normal.         Behavior: Behavior normal.         Vents:  Vent Mode: Spont/T (09/08/20 1607)  Ventilator Initiated: Yes (09/04/20 1130)  Set Rate: 14 BPM (09/08/20 1142)  Vt Set: 330 mL (09/08/20 1142)  Pressure Support: 5 cmH20 (09/08/20 1607)  PEEP/CPAP: 5 cmH20 (09/08/20 1607)  Oxygen Concentration (%): 70 (09/18/20 0600)  Peak Airway Pressure: 21 cmH2O (09/08/20 1142)  Plateau Pressure: 14 cmH20 (09/08/20 0850)  Total Ve: 4.54 mL (09/08/20 1142)  Negative Inspiratory Force (cm H2O): -35 (09/08/20 1607)  F/VT Ratio<105 (RSBI): (!) 43.21 (09/08/20 1142)    Lines/Drains/Airways     Central Venous Catheter Line            Percutaneous Central Line  Insertion/Assessment - Triple Lumen  09/04/20 0800 right internal jugular 13 days          Drain                 Gastrostomy/Enterostomy 09/04/20 LUQ decompression 14 days         Urethral Catheter 09/14/20 1330 3 days                Significant Labs:    CBC/Anemia Profile:  Recent Labs   Lab 09/17/20  0300 09/18/20  0013 09/18/20  0300   WBC 14.32* 20.17* 12.54   HGB 9.5* 9.5* 8.6*   HCT 31.1* 30.6* 27.3*    221 160   MCV 93 94 93   RDW 17.9* 18.1* 18.1*        Chemistries:  Recent Labs   Lab 09/17/20  0300 09/18/20  0013 09/18/20  0300   * 147* 146*   K 3.7 4.8 4.7   * 115* 115*   CO2 21* 19* 19*   BUN 48* 59* 65*   CREATININE 1.2 1.6* 1.7*   CALCIUM 8.3* 9.1 8.5*   ALBUMIN 1.5* 1.4* 1.3*   PROT 5.6* 5.7* 5.1*   BILITOT 3.4* 3.5* 3.0*   ALKPHOS 211* 254* 232*   ALT 44 37 36   AST 42* 41* 40   MG 1.9  --  2.2   PHOS 3.1  --  3.9       All pertinent labs within the past 24 hours have been reviewed.    Significant Imaging:  I have reviewed and interpreted all pertinent imaging results/findings within the past 24 hours.    Assessment/Plan:     * Perforated abdominal viscus  Neuro:  -sedation: none  -analgesia: PRN oxy. Dilaudid  -seroquel 100mg BID  - haldol PRN at night     CV:   - Hypotensive this morning  - 1L IVF overnight, 2L this morning  - start levo gtt, goal MAP > 65mmhg  - repeat TTE, last with EF 30% and WMA on admission   - Troponin check    Pulm:  - Possible aspiration overnight, increasing O2 requirement, now on comfortflo 70% FiO2  - Duonebs Q6 PRN     FEN/GI: Perforated stomach; s/p washout and patch (9/4); abdominal closure (9/6)  - s/p washout and closure on 9/6   - Tube feeds at trickle; advance to goal as tolerated  - G tube with mucoid drainage around insertion site; decreased since discontinuing FWF; CTM   - Lactulose discontinued (9/14) in setting of normal ammonia level (9/10) and improving mental status  - Daily metabolic panel with PRN repletion of electrolytes  -  pantoprazole for ulcer ppx  - hold tube feeds, G tube to gravity  - Cont TPN  - repeat CT abd today, eval fluid leakage around G tube      Renal:  - Continue to monitor with daily metabolic labs  - nephrology on board, appreciate recs  - Cr worsening today, recieved 1L NS bolus for increasing UOP      HEME/ID:   - H/H stable    - Daily CBC  - s/p Liver transplant in 2002; Daily tacrolimus level with AM labs. Per hepatology, continue tacrolimus 1mg BID  - Diflucan and Zosyn - d/c 9/18  - Lovenox for DVT ppx  - Check lactic acid for hypotension     Endo:  - Synthroid 40mcg daily    Dispo:  - Continue SICU care       Carl Pino MD  Critical Care - Surgery  [facility]

## 2020-09-18 NOTE — ANESTHESIA PROCEDURE NOTES
Intubation  Performed by: Shiloh Myrick CRNA  Authorized by: Norah Jung MD     Intubation:     Induction:  Intravenous    Intubated:  Postinduction    Mask Ventilation:  Easy mask    Attempts:  1    Attempted By:  CRNA    Method of Intubation:  Video laryngoscopy    Blade:  Glidescope 3    Laryngeal View Grade: Grade I - full view of chords      Difficult Airway Encountered?: No      Complications:  None    Airway Device:  Oral endotracheal tube    Airway Device Size:  7.0    Style/Cuff Inflation:  Cuffed    Inflation Amount (mL):  10    Tube secured:  22    Secured at:  The lips    Placement Verified By:  Capnometry    Complicating Factors:  None    Findings Post-Intubation:  BS equal bilateral

## 2020-09-18 NOTE — ASSESSMENT & PLAN NOTE
Neuro:  -sedation: none  -analgesia: PRN oxy. Dilaudid  -seroquel 100mg BID  - haldol PRN at night     CV:   - Hypotensive this morning  - 1L IVF overnight, 2L this morning  - start levo gtt, goal MAP > 65mmhg  - repeat TTE, last with EF 30% and WMA on admission   - Troponin check    Pulm:  - Possible aspiration overnight, increasing O2 requirement, now on comfortflo 70% FiO2  - Duonebs Q6 PRN     FEN/GI: Perforated stomach; s/p washout and patch (9/4); abdominal closure (9/6)  - s/p washout and closure on 9/6   - Tube feeds at trickle; advance to goal as tolerated  - G tube with mucoid drainage around insertion site; decreased since discontinuing FWF; CTM   - Lactulose discontinued (9/14) in setting of normal ammonia level (9/10) and improving mental status  - Daily metabolic panel with PRN repletion of electrolytes  - pantoprazole for ulcer ppx  - hold tube feeds, G tube to gravity  - Cont TPN  - repeat CT abd today, eval fluid leakage around G tube      Renal:  - Continue to monitor with daily metabolic labs  - nephrology on board, appreciate recs  - Cr worsening today, recieved 1L NS bolus for increasing UOP      HEME/ID:   - H/H stable    - Daily CBC  - s/p Liver transplant in 2002; Daily tacrolimus level with AM labs. Per hepatology, continue tacrolimus 1mg BID  - Diflucan and Zosyn - d/c 9/18  - Lovenox for DVT ppx  - Check lactic acid for hypotension     Endo:  - Synthroid 40mcg daily    Dispo:  - Continue SICU care

## 2020-09-18 NOTE — SUBJECTIVE & OBJECTIVE
Interval History: Patient on Comfort Flow 30L/70%. Continues to have drainage around G tube site    Medications:  Continuous Infusions:   TPN ADULT CENTRAL LINE CUSTOM 40 mL/hr at 09/18/20 0800     Scheduled Meds:   acetaminophen  500 mg Per G Tube Q6H    bisacodyL  10 mg Rectal Once    fat emulsion 20%  250 mL Intravenous Daily    heparin (porcine)  5,000 Units Subcutaneous Q8H    levothyroxine  75 mcg Per G Tube Daily    melatonin  6 mg Per G Tube Nightly    pantoprazole  40 mg Per G Tube Daily    QUEtiapine  100 mg Per G Tube BID    sodium chloride 0.9% bolus  1,000 mL Intravenous Once    tacrolimus  1 mg Per G Tube Daily AM     PRN Meds:sodium chloride, albuterol-ipratropium, albuterol-ipratropium, calcium gluconate IVPB, calcium gluconate IVPB, calcium gluconate IVPB, dextrose 50%, dextrose 50%, glucagon (human recombinant), glucose, glucose, haloperidol lactate, hydrALAZINE, insulin aspart U-100, labetaloL, magnesium sulfate IVPB, magnesium sulfate IVPB, ondansetron, potassium chloride in water **AND** potassium chloride in water **AND** potassium chloride in water, promethazine, sodium chloride 0.9%, sodium chloride 0.9%, sodium phosphate IVPB, sodium phosphate IVPB, sodium phosphate IVPB     Review of patient's allergies indicates:   Allergen Reactions    Codeine Itching     Other reaction(s): Itching    Lipitor [atorvastatin] Other (See Comments)     Other reaction(s): Muscle pain  Muscle cranmps    Morphine Itching     Other reaction(s): nausea and vomiting     Zoloft [sertraline] Other (See Comments)     Tremors/muscle spasms     Objective:     Vital Signs (Most Recent):  Temp: 98.7 °F (37.1 °C) (09/18/20 0700)  Pulse: 104 (09/18/20 0800)  Resp: (!) 24 (09/18/20 0800)  BP: (!) 110/56 (09/18/20 0800)  SpO2: (!) 93 % (09/18/20 0800) Vital Signs (24h Range):  Temp:  [97.2 °F (36.2 °C)-99.5 °F (37.5 °C)] 98.7 °F (37.1 °C)  Pulse:  [] 104  Resp:  [24-55] 24  SpO2:  [85 %-98 %] 93 %  BP:  ()/(46-91) 110/56     Weight: 68 kg (149 lb 14.6 oz)  Body mass index is 30.28 kg/m².    Intake/Output - Last 3 Shifts       09/16 0700 - 09/17 0659 09/17 0700 - 09/18 0659 09/18 0700 - 09/19 0659    I.V. (mL/kg) 903 (13.3) 547 (8)     Blood       NG/GT 1615 870     IV Piggyback  1000      1146     Total Intake(mL/kg) 3469 (51) 3563 (52.4)     Urine (mL/kg/hr) 1540 (0.9) 785 (0.5) 45 (0.5)    Drains  800     Stool 0      Total Output 1540 1585 45    Net +1929 +1978 -45           Stool Occurrence 2 x            Physical Exam  Constitutional:       General: She is not in acute distress.  Cardiovascular:      Rate and Rhythm: Regular rhythm. Tachycardia present.   Pulmonary:      Effort: Pulmonary effort is normal. No respiratory distress.   Abdominal:      General: There is no distension.      Palpations: Abdomen is soft.      Tenderness: There is no abdominal tenderness.      Comments: Incision c/d/i  Thick drainage around G tube   Neurological:      General: No focal deficit present.      Mental Status: She is alert and oriented to person, place, and time.   Psychiatric:         Mood and Affect: Mood normal.         Behavior: Behavior normal.         Significant Labs:  CBC:   Recent Labs   Lab 09/18/20  0300   WBC 12.54   RBC 2.94*   HGB 8.6*   HCT 27.3*      MCV 93   MCH 29.3   MCHC 31.5*     CMP:   Recent Labs   Lab 09/18/20  0300   *   CALCIUM 8.5*   ALBUMIN 1.3*   PROT 5.1*   *   K 4.7   CO2 19*   *   BUN 65*   CREATININE 1.7*   ALKPHOS 232*   ALT 36   AST 40   BILITOT 3.0*       Significant Diagnostics:  I have reviewed all pertinent imaging results/findings within the past 24 hours.

## 2020-09-18 NOTE — NURSING
"Dx: Perforated abdominal viscus    Neuro:. Follows commands and opens eyes to voice. Moves all extrememties.  Vital Signs: BP (!) 107/54   Pulse 104   Temp 98.4 °F (36.9 °C) (Oral)   Resp (!) 43   Ht 4' 11" (1.499 m)   Wt 68 kg (149 lb 14.6 oz)   SpO2 (!) 94%   BMI 30.28 kg/m²   Cardiac: NSR 80-90s  Resp: AC/VC+ 16/350/60%/+8  Diet: NPO; TPN @ 40  Gtts: Precedex @ 0.6   Urine Output: UOP 30ml/hr  Drains: PEG to gravity; JPx2 with sanguenous drainage  Labs/Accuchecks: see labs results; accucheck Q4  Skin: generalized ecchymosis; skin tears with foam dsg in place; turned Q2; heels elevated off bed; sizewise mattress  Shift Events: Patient had persistent foul smelling drainage from PEG and around the PEG site. Dr. Decker notified. CT ordered and gastric perf and leak found. Patient was then a class A to the OR for an ex lap and PEG exchange. Patient was intubated in OR. Will continue to monitor         "

## 2020-09-18 NOTE — RESPIRATORY THERAPY
Received patient from OR intubated with 7.0 ETT secured at 21 cm at the lips. Placed patient on ventilator with documented settings. Will continue to monitor.

## 2020-09-19 LAB
ALBUMIN SERPL BCP-MCNC: 1 G/DL (ref 3.5–5.2)
ALP SERPL-CCNC: 217 U/L (ref 55–135)
ALT SERPL W/O P-5'-P-CCNC: 43 U/L (ref 10–44)
ANION GAP SERPL CALC-SCNC: 13 MMOL/L (ref 8–16)
ANION GAP SERPL CALC-SCNC: 13 MMOL/L (ref 8–16)
ANISOCYTOSIS BLD QL SMEAR: SLIGHT
APTT BLDCRRT: 30.2 SEC (ref 21–32)
ASCENDING AORTA: 2.69 CM
AST SERPL-CCNC: 62 U/L (ref 10–40)
AV INDEX (PROSTH): 0.87
AV MEAN GRADIENT: 7 MMHG
AV PEAK GRADIENT: 13 MMHG
AV VALVE AREA: 2.84 CM2
AV VELOCITY RATIO: 0.69
BASOPHILS # BLD AUTO: 0.03 K/UL (ref 0–0.2)
BASOPHILS NFR BLD: 0.3 % (ref 0–1.9)
BILIRUB SERPL-MCNC: 2.1 MG/DL (ref 0.1–1)
BLD PROD TYP BPU: NORMAL
BLOOD UNIT EXPIRATION DATE: NORMAL
BLOOD UNIT TYPE CODE: 5100
BLOOD UNIT TYPE: NORMAL
BSA FOR ECHO PROCEDURE: 1.68 M2
BUN SERPL-MCNC: 73 MG/DL (ref 6–20)
BUN SERPL-MCNC: 73 MG/DL (ref 6–20)
BURR CELLS BLD QL SMEAR: ABNORMAL
CALCIUM SERPL-MCNC: 8.1 MG/DL (ref 8.7–10.5)
CALCIUM SERPL-MCNC: 8.1 MG/DL (ref 8.7–10.5)
CHLORIDE SERPL-SCNC: 115 MMOL/L (ref 95–110)
CHLORIDE SERPL-SCNC: 115 MMOL/L (ref 95–110)
CO2 SERPL-SCNC: 17 MMOL/L (ref 23–29)
CO2 SERPL-SCNC: 17 MMOL/L (ref 23–29)
CODING SYSTEM: NORMAL
CREAT SERPL-MCNC: 1.7 MG/DL (ref 0.5–1.4)
CREAT SERPL-MCNC: 1.7 MG/DL (ref 0.5–1.4)
CV ECHO LV RWT: 0.51 CM
DIFFERENTIAL METHOD: ABNORMAL
DISPENSE STATUS: NORMAL
DOP CALC AO PEAK VEL: 1.82 M/S
DOP CALC AO VTI: 21.28 CM
DOP CALC LVOT AREA: 3.3 CM2
DOP CALC LVOT DIAMETER: 2.04 CM
DOP CALC LVOT PEAK VEL: 1.26 M/S
DOP CALC LVOT STROKE VOLUME: 60.5 CM3
DOP CALCLVOT PEAK VEL VTI: 18.52 CM
E WAVE DECELERATION TIME: 129.43 MSEC
E/A RATIO: 0.75
E/E' RATIO: 8.24 M/S
ECHO LV POSTERIOR WALL: 0.81 CM (ref 0.6–1.1)
EOSINOPHIL # BLD AUTO: 0.3 K/UL (ref 0–0.5)
EOSINOPHIL NFR BLD: 2.9 % (ref 0–8)
ERYTHROCYTE [DISTWIDTH] IN BLOOD BY AUTOMATED COUNT: 17.6 % (ref 11.5–14.5)
EST. GFR  (AFRICAN AMERICAN): 39.4 ML/MIN/1.73 M^2
EST. GFR  (AFRICAN AMERICAN): 39.4 ML/MIN/1.73 M^2
EST. GFR  (NON AFRICAN AMERICAN): 34.2 ML/MIN/1.73 M^2
EST. GFR  (NON AFRICAN AMERICAN): 34.2 ML/MIN/1.73 M^2
FRACTIONAL SHORTENING: 28 % (ref 28–44)
GLUCOSE SERPL-MCNC: 121 MG/DL (ref 70–110)
GLUCOSE SERPL-MCNC: 121 MG/DL (ref 70–110)
HCT VFR BLD AUTO: 24.4 % (ref 37–48.5)
HGB BLD-MCNC: 7.5 G/DL (ref 12–16)
HYPOCHROMIA BLD QL SMEAR: ABNORMAL
IMM GRANULOCYTES # BLD AUTO: 0.19 K/UL (ref 0–0.04)
IMM GRANULOCYTES NFR BLD AUTO: 2 % (ref 0–0.5)
INR PPP: 1.1 (ref 0.8–1.2)
INTERVENTRICULAR SEPTUM: 1.11 CM (ref 0.6–1.1)
IVRT: 45.67 MSEC
LA MAJOR: 5.35 CM
LA MINOR: 5.59 CM
LA WIDTH: 3.76 CM
LEFT ATRIUM SIZE: 3.5 CM
LEFT ATRIUM VOLUME INDEX: 37.6 ML/M2
LEFT ATRIUM VOLUME: 61.16 CM3
LEFT INTERNAL DIMENSION IN SYSTOLE: 2.32 CM (ref 2.1–4)
LEFT VENTRICLE DIASTOLIC VOLUME INDEX: 25.13 ML/M2
LEFT VENTRICLE DIASTOLIC VOLUME: 40.89 ML
LEFT VENTRICLE MASS INDEX: 52 G/M2
LEFT VENTRICLE SYSTOLIC VOLUME INDEX: 11.4 ML/M2
LEFT VENTRICLE SYSTOLIC VOLUME: 18.52 ML
LEFT VENTRICULAR INTERNAL DIMENSION IN DIASTOLE: 3.2 CM (ref 3.5–6)
LEFT VENTRICULAR MASS: 85.01 G
LV LATERAL E/E' RATIO: 7 M/S
LV SEPTAL E/E' RATIO: 10 M/S
LYMPHOCYTES # BLD AUTO: 0.7 K/UL (ref 1–4.8)
LYMPHOCYTES NFR BLD: 8 % (ref 18–48)
MAGNESIUM SERPL-MCNC: 2.2 MG/DL (ref 1.6–2.6)
MCH RBC QN AUTO: 28.7 PG (ref 27–31)
MCHC RBC AUTO-ENTMCNC: 30.7 G/DL (ref 32–36)
MCV RBC AUTO: 94 FL (ref 82–98)
MONOCYTES # BLD AUTO: 0.6 K/UL (ref 0.3–1)
MONOCYTES NFR BLD: 6.2 % (ref 4–15)
MV PEAK A VEL: 0.93 M/S
MV PEAK E VEL: 0.7 M/S
MV STENOSIS PRESSURE HALF TIME: 37.53 MS
MV VALVE AREA P 1/2 METHOD: 5.86 CM2
NEUTROPHILS # BLD AUTO: 7.5 K/UL (ref 1.8–7.7)
NEUTROPHILS NFR BLD: 80.6 % (ref 38–73)
NRBC BLD-RTO: 1 /100 WBC
NUM UNITS TRANS PACKED RBC: NORMAL
PHOSPHATE SERPL-MCNC: 4.7 MG/DL (ref 2.7–4.5)
PHOSPHATE SERPL-MCNC: 5.4 MG/DL (ref 2.7–4.5)
PISA TR MAX VEL: 2.96 M/S
PLATELET # BLD AUTO: 155 K/UL (ref 150–350)
PMV BLD AUTO: 12 FL (ref 9.2–12.9)
POCT GLUCOSE: 144 MG/DL (ref 70–110)
POCT GLUCOSE: 145 MG/DL (ref 70–110)
POCT GLUCOSE: 151 MG/DL (ref 70–110)
POCT GLUCOSE: 168 MG/DL (ref 70–110)
POIKILOCYTOSIS BLD QL SMEAR: SLIGHT
POLYCHROMASIA BLD QL SMEAR: ABNORMAL
POTASSIUM SERPL-SCNC: 4.4 MMOL/L (ref 3.5–5.1)
POTASSIUM SERPL-SCNC: 4.4 MMOL/L (ref 3.5–5.1)
PROT SERPL-MCNC: 4.9 G/DL (ref 6–8.4)
PROTHROMBIN TIME: 12.3 SEC (ref 9–12.5)
RA MAJOR: 4.41 CM
RA WIDTH: 2.07 CM
RBC # BLD AUTO: 2.61 M/UL (ref 4–5.4)
RIGHT VENTRICULAR END-DIASTOLIC DIMENSION: 2.7 CM
RV TISSUE DOPPLER FREE WALL SYSTOLIC VELOCITY 1 (APICAL 4 CHAMBER VIEW): 20.78 CM/S
SINUS: 2.76 CM
SODIUM SERPL-SCNC: 145 MMOL/L (ref 136–145)
SODIUM SERPL-SCNC: 145 MMOL/L (ref 136–145)
STJ: 2.44 CM
TACROLIMUS BLD-MCNC: 3.3 NG/ML (ref 5–15)
TDI LATERAL: 0.1 M/S
TDI SEPTAL: 0.07 M/S
TDI: 0.09 M/S
TR MAX PG: 35 MMHG
TRICUSPID ANNULAR PLANE SYSTOLIC EXCURSION: 2.48 CM
WBC # BLD AUTO: 9.3 K/UL (ref 3.9–12.7)

## 2020-09-19 PROCEDURE — 99900035 HC TECH TIME PER 15 MIN (STAT)

## 2020-09-19 PROCEDURE — 25000003 PHARM REV CODE 250: Performed by: STUDENT IN AN ORGANIZED HEALTH CARE EDUCATION/TRAINING PROGRAM

## 2020-09-19 PROCEDURE — 84100 ASSAY OF PHOSPHORUS: CPT | Mod: 91

## 2020-09-19 PROCEDURE — 99291 CRITICAL CARE FIRST HOUR: CPT | Mod: 24,GC,, | Performed by: SURGERY

## 2020-09-19 PROCEDURE — 63600175 PHARM REV CODE 636 W HCPCS: Performed by: STUDENT IN AN ORGANIZED HEALTH CARE EDUCATION/TRAINING PROGRAM

## 2020-09-19 PROCEDURE — 83735 ASSAY OF MAGNESIUM: CPT

## 2020-09-19 PROCEDURE — P9045 ALBUMIN (HUMAN), 5%, 250 ML: HCPCS | Mod: JG | Performed by: SURGERY

## 2020-09-19 PROCEDURE — 99900026 HC AIRWAY MAINTENANCE (STAT)

## 2020-09-19 PROCEDURE — 85025 COMPLETE CBC W/AUTO DIFF WBC: CPT

## 2020-09-19 PROCEDURE — 25000003 PHARM REV CODE 250: Performed by: SURGERY

## 2020-09-19 PROCEDURE — 94761 N-INVAS EAR/PLS OXIMETRY MLT: CPT

## 2020-09-19 PROCEDURE — A4217 STERILE WATER/SALINE, 500 ML: HCPCS | Performed by: SURGERY

## 2020-09-19 PROCEDURE — 63600175 PHARM REV CODE 636 W HCPCS: Performed by: SURGERY

## 2020-09-19 PROCEDURE — 80053 COMPREHEN METABOLIC PANEL: CPT

## 2020-09-19 PROCEDURE — C9113 INJ PANTOPRAZOLE SODIUM, VIA: HCPCS | Performed by: STUDENT IN AN ORGANIZED HEALTH CARE EDUCATION/TRAINING PROGRAM

## 2020-09-19 PROCEDURE — 85730 THROMBOPLASTIN TIME PARTIAL: CPT

## 2020-09-19 PROCEDURE — 27000221 HC OXYGEN, UP TO 24 HOURS

## 2020-09-19 PROCEDURE — 94003 VENT MGMT INPAT SUBQ DAY: CPT

## 2020-09-19 PROCEDURE — 85610 PROTHROMBIN TIME: CPT

## 2020-09-19 PROCEDURE — 20000000 HC ICU ROOM

## 2020-09-19 PROCEDURE — B4185 PARENTERAL SOL 10 GM LIPIDS: HCPCS | Performed by: STUDENT IN AN ORGANIZED HEALTH CARE EDUCATION/TRAINING PROGRAM

## 2020-09-19 PROCEDURE — 99291 PR CRITICAL CARE, E/M 30-74 MINUTES: ICD-10-PCS | Mod: 24,GC,, | Performed by: SURGERY

## 2020-09-19 PROCEDURE — 80197 ASSAY OF TACROLIMUS: CPT

## 2020-09-19 RX ORDER — ALBUMIN HUMAN 50 G/1000ML
25 SOLUTION INTRAVENOUS ONCE
Status: COMPLETED | OUTPATIENT
Start: 2020-09-19 | End: 2020-09-19

## 2020-09-19 RX ADMIN — HEPARIN SODIUM 5000 UNITS: 5000 INJECTION INTRAVENOUS; SUBCUTANEOUS at 02:09

## 2020-09-19 RX ADMIN — PANTOPRAZOLE SODIUM 40 MG: 40 INJECTION, POWDER, LYOPHILIZED, FOR SOLUTION INTRAVENOUS at 09:09

## 2020-09-19 RX ADMIN — INSULIN ASPART 2 UNITS: 100 INJECTION, SOLUTION INTRAVENOUS; SUBCUTANEOUS at 11:09

## 2020-09-19 RX ADMIN — PROPOFOL 15 MCG/KG/MIN: 10 INJECTION, EMULSION INTRAVENOUS at 05:09

## 2020-09-19 RX ADMIN — I.V. FAT EMULSION 250 ML: 20 EMULSION INTRAVENOUS at 10:09

## 2020-09-19 RX ADMIN — HEPARIN SODIUM 5000 UNITS: 5000 INJECTION INTRAVENOUS; SUBCUTANEOUS at 10:09

## 2020-09-19 RX ADMIN — HEPARIN SODIUM 5000 UNITS: 5000 INJECTION INTRAVENOUS; SUBCUTANEOUS at 06:09

## 2020-09-19 RX ADMIN — TACROLIMUS 0.5 MG: 1 CAPSULE ORAL at 11:09

## 2020-09-19 RX ADMIN — DEXMEDETOMIDINE HYDROCHLORIDE 0.2 MCG/KG/HR: 4 INJECTION, SOLUTION INTRAVENOUS at 08:09

## 2020-09-19 RX ADMIN — DEXMEDETOMIDINE HYDROCHLORIDE 1.4 MCG/KG/HR: 4 INJECTION, SOLUTION INTRAVENOUS at 12:09

## 2020-09-19 RX ADMIN — DEXMEDETOMIDINE HYDROCHLORIDE 1.4 MCG/KG/HR: 4 INJECTION, SOLUTION INTRAVENOUS at 08:09

## 2020-09-19 RX ADMIN — PIPERACILLIN SODIUM AND TAZOBACTAM SODIUM 4.5 G: 4; .5 INJECTION, POWDER, LYOPHILIZED, FOR SOLUTION INTRAVENOUS at 10:09

## 2020-09-19 RX ADMIN — FLUCONAZOLE, SODIUM CHLORIDE 200 MG: 2 INJECTION INTRAVENOUS at 03:09

## 2020-09-19 RX ADMIN — PIPERACILLIN SODIUM AND TAZOBACTAM SODIUM 4.5 G: 4; .5 INJECTION, POWDER, LYOPHILIZED, FOR SOLUTION INTRAVENOUS at 05:09

## 2020-09-19 RX ADMIN — ALBUMIN (HUMAN) 25 G: 12.5 SOLUTION INTRAVENOUS at 08:09

## 2020-09-19 RX ADMIN — MAGNESIUM SULFATE HEPTAHYDRATE: 500 INJECTION, SOLUTION INTRAMUSCULAR; INTRAVENOUS at 10:09

## 2020-09-19 NOTE — PROGRESS NOTES
[facility]  General Surgery  Progress Note    Subjective:     History of Present Illness:  Ms. Hernandez is a 49yo F w/PMH of von Gierke disease s/p liver transplant (2002, on tacro + MMF, follows Dr. Nielsen), hx chronic rejection (bx 2015 with acute and chronic rejection s/p steroids), biliary stricture and ductopenic rejection, recently with cirrhosis on fibroscan (10/2019), HTN, and depression who presents as a transfer for further evaluation of gastric outlet obstruction and esophageal stricturing.  Patient decompensated requiring multiple pressors and intubation. CT scan showed free air. Prior to intubation patient reported severe abdominal pain.   states that patient has had epigastric pain, nausea, and vomiting for several days.     Post-Op Info:  Procedure(s) (LRB):  LAPAROTOMY, EXPLORATORY, DRAINAGE OF ABSCESS, REPAIR OF GASTRIC PERFORATION, GASTROSTOMY TUBE PLACEMENT (N/A)  APPLICATION, WOUND VAC (N/A)   1 Day Post-Op     Interval History: Class A to the OR for ex lap due to free air on CT and extravasation of oral contrast from the stomach into the peritoneal cavity. Did well overnight. Required pressors. Likely needed for volume resuscitation     Medications:  Continuous Infusions:   dexmedetomidine (PRECEDEX) infusion 1.4 mcg/kg/hr (09/19/20 0900)    norepinephrine bitartrate-D5W 0.07 mcg/kg/min (09/19/20 0900)    propofoL 15 mcg/kg/min (09/19/20 0900)    TPN ADULT CENTRAL LINE CUSTOM 40 mL/hr at 09/19/20 0900    TPN ADULT CENTRAL LINE CUSTOM       Scheduled Meds:   fat emulsion 20%  250 mL Intravenous Daily    fat emulsion 20%  250 mL Intravenous Daily    fluconazole (DIFLUCAN) IVPB  200 mg Intravenous Q24H    heparin (porcine)  5,000 Units Subcutaneous Q8H    pantoprazole  40 mg Intravenous Daily    piperacillin-tazobactam (ZOSYN) IVPB  4.5 g Intravenous Q8H    tacrolimus  0.5 mg Sublingual Daily AM     PRN Meds:sodium chloride, albuterol-ipratropium,  albuterol-ipratropium, calcium gluconate IVPB, calcium gluconate IVPB, calcium gluconate IVPB, dextrose 50%, dextrose 50%, glucagon (human recombinant), glucose, glucose, haloperidol lactate, hydrALAZINE, insulin aspart U-100, labetaloL, magnesium sulfate IVPB, magnesium sulfate IVPB, ondansetron, potassium chloride in water **AND** potassium chloride in water **AND** potassium chloride in water, promethazine, propofoL, sodium chloride 0.9%, sodium chloride 0.9%, sodium phosphate IVPB, sodium phosphate IVPB, sodium phosphate IVPB     Review of patient's allergies indicates:   Allergen Reactions    Codeine Itching     Other reaction(s): Itching    Lipitor [atorvastatin] Other (See Comments)     Other reaction(s): Muscle pain  Muscle cranmps    Morphine Itching     Other reaction(s): nausea and vomiting     Zoloft [sertraline] Other (See Comments)     Tremors/muscle spasms     Objective:     Vital Signs (Most Recent):  Temp: 98.9 °F (37.2 °C) (09/19/20 0700)  Pulse: 64 (09/19/20 0845)  Resp: (!) 21 (09/19/20 0845)  BP: (!) 95/54 (09/19/20 0800)  SpO2: 95 % (09/19/20 0845) Vital Signs (24h Range):  Temp:  [97.6 °F (36.4 °C)-98.9 °F (37.2 °C)] 98.9 °F (37.2 °C)  Pulse:  [] 64  Resp:  [19-55] 21  SpO2:  [92 %-99 %] 95 %  BP: ()/(44-65) 95/54  Arterial Line BP: ()/(44-66) 107/48     Weight: 67.6 kg (149 lb)  Body mass index is 30.09 kg/m².    Intake/Output - Last 3 Shifts       09/17 0700 - 09/18 0659 09/18 0700 - 09/19 0659 09/19 0700 - 09/20 0659    I.V. (mL/kg) 547 (8) 3571.4 (52.8)     NG/ 900     IV Piggyback 1000      TPN 1146 1135     Total Intake(mL/kg) 3563 (52.4) 5606.4 (82.9)     Urine (mL/kg/hr) 785 (0.5) 942 (0.6) 85 (0.5)    Drains 800 1805 68    Stool       Total Output 1585 2747 153    Net +1978 +2859.4 -153                 Physical Exam  Constitutional:       General: She is not in acute distress.  Cardiovascular:      Rate and Rhythm: Regular rhythm. Tachycardia present.    Pulmonary:      Effort: Pulmonary effort is normal. No respiratory distress.   Abdominal:      General: There is no distension.      Palpations: Abdomen is soft.      Tenderness: There is no abdominal tenderness.      Comments: Incision c/d/i  Thick drainage around G tube   Neurological:      General: No focal deficit present.      Mental Status: She is alert and oriented to person, place, and time.   Psychiatric:         Mood and Affect: Mood normal.         Behavior: Behavior normal.         Significant Labs:  CBC:   Recent Labs   Lab 09/19/20  0400   WBC 9.30   RBC 2.61*   HGB 7.5*   HCT 24.4*      MCV 94   MCH 28.7   MCHC 30.7*     CMP:   Recent Labs   Lab 09/19/20  0300   *  121*   CALCIUM 8.1*  8.1*   ALBUMIN 1.0*   PROT 4.9*     145   K 4.4  4.4   CO2 17*  17*   *  115*   BUN 73*  73*   CREATININE 1.7*  1.7*   ALKPHOS 217*   ALT 43   AST 62*   BILITOT 2.1*       Significant Diagnostics:  I have reviewed all pertinent imaging results/findings within the past 24 hours.    Assessment/Plan:     * Perforated abdominal viscus  50 y.o. female w/ free air on CT scan. S/p emergent ex lap on 9/4 w/ findings of gastric perforation, primary repair. Now w/  abdominal closure on 9/6, skin stapled closed, KERRY drains removed. Class A to OR on 9/18 for free air and extrav or oral contrast seen on CT     Continue SICU care, appreciate their assistance  G tube upsized, keep to gravity  Zosyn and diflucan for suspected intraabodminal source, WBC improving  IVF resuscitation to wean pressors        Rocio Hidalgo MD  General Surgery  [facility]

## 2020-09-19 NOTE — SUBJECTIVE & OBJECTIVE
Interval History/Significant Events: NAEO. Patient doing well s/p exlap yesterday after found with free air and tube feeds in the abdomen. Currently intubated and sedated. Weaning to extubate.     Follow-up For: Procedure(s) (LRB):  LAPAROTOMY, EXPLORATORY, DRAINAGE OF ABSCESS, REPAIR OF GASTRIC PERFORATION, GASTROSTOMY TUBE PLACEMENT (N/A)  APPLICATION, WOUND VAC (N/A)    Post-Operative Day: 1 Day Post-Op    Objective:     Vital Signs (Most Recent):  Temp: 97.6 °F (36.4 °C) (09/19/20 0300)  Pulse: 94 (09/19/20 0600)  Resp: 19 (09/19/20 0600)  BP: (!) 103/57 (09/19/20 0600)  SpO2: 95 % (09/19/20 0600) Vital Signs (24h Range):  Temp:  [97.6 °F (36.4 °C)-98.8 °F (37.1 °C)] 97.6 °F (36.4 °C)  Pulse:  [] 94  Resp:  [18-55] 19  SpO2:  [92 %-98 %] 95 %  BP: ()/(44-65) 103/57  Arterial Line BP: ()/(48-66) 107/51     Weight: 67.6 kg (149 lb)  Body mass index is 30.09 kg/m².      Intake/Output Summary (Last 24 hours) at 9/19/2020 0731  Last data filed at 9/19/2020 0600  Gross per 24 hour   Intake 5606.4 ml   Output 2702 ml   Net 2904.4 ml       Physical Exam  Constitutional:       General: She is not in acute distress.     Comments: Sedated   Cardiovascular:      Rate and Rhythm: Regular rhythm. Tachycardia present.   Pulmonary:      Effort: Pulmonary effort is normal. No respiratory distress.      Comments: Intbuated  Abdominal:      General: There is no distension.      Palpations: Abdomen is soft.      Tenderness: There is no abdominal tenderness.      Comments: Incision c/d/i  Thick drainage around G tube   Neurological:      General: No focal deficit present.      Mental Status: She is oriented to person, place, and time.   Psychiatric:         Mood and Affect: Mood normal.         Behavior: Behavior normal.         Vents:  Vent Mode: A/C (09/19/20 0333)  Ventilator Initiated: Yes (09/18/20 1648)  Set Rate: 16 BPM (09/19/20 0333)  Vt Set: 350 mL (09/19/20 0333)  Pressure Support: 5 cmH20 (09/08/20  1607)  PEEP/CPAP: 8 cmH20 (09/19/20 0333)  Oxygen Concentration (%): 40 (09/19/20 0600)  Peak Airway Pressure: 16 cmH2O (09/19/20 0333)  Plateau Pressure: 16 cmH20 (09/18/20 1648)  Total Ve: 9.31 mL (09/19/20 0333)  Negative Inspiratory Force (cm H2O): -35 (09/08/20 1607)  F/VT Ratio<105 (RSBI): (!) 38.75 (09/19/20 0333)    Lines/Drains/Airways     Central Venous Catheter Line            Percutaneous Central Line Insertion/Assessment - Triple Lumen  09/04/20 0800 right internal jugular 14 days          Drain                 Urethral Catheter 09/14/20 1330 4 days         Closed/Suction Drain 09/18/20 1555 Left Abdomen Bulb 19 Fr. less than 1 day         Closed/Suction Drain 09/18/20 1559 Left Abdomen Bulb 19 Fr. less than 1 day         Gastrostomy/Enterostomy 09/18/20 1612 Other (see comments) LUQ decompression;feeding less than 1 day          Airway                 Airway - Non-Surgical 09/18/20 1519 Endotracheal Tube less than 1 day                Significant Labs:    CBC/Anemia Profile:  Recent Labs   Lab 09/18/20  0013 09/18/20  0300 09/19/20  0400   WBC 20.17* 12.54 9.30   HGB 9.5* 8.6* 7.5*   HCT 30.6* 27.3* 24.4*    160 155   MCV 94 93 94   RDW 18.1* 18.1* 17.6*        Chemistries:  Recent Labs   Lab 09/18/20  0013 09/18/20  0300 09/18/20  1435 09/19/20  0300   * 146* 145 145  145   K 4.8 4.7 4.7 4.4  4.4   * 115* 115* 115*  115*   CO2 19* 19* 21* 17*  17*   BUN 59* 65* 75* 73*  73*   CREATININE 1.6* 1.7* 1.7* 1.7*  1.7*   CALCIUM 9.1 8.5* 8.4* 8.1*  8.1*   ALBUMIN 1.4* 1.3*  --  1.0*   PROT 5.7* 5.1*  --  4.9*   BILITOT 3.5* 3.0*  --  2.1*   ALKPHOS 254* 232*  --  217*   ALT 37 36  --  43   AST 41* 40  --  62*   MG  --  2.2  --  2.2   PHOS  --  3.9  --  5.4*       All pertinent labs within the past 24 hours have been reviewed.    Significant Imaging:  I have reviewed all pertinent imaging results/findings within the past 24 hours.

## 2020-09-19 NOTE — PLAN OF CARE
Pt on AC/VC 40% 8 PEEP. MAP maintained >65 w/ levo gtt @ 0.05. Other gtts include precedex @ 1.4 mcg/kg/hr, propofol @ 15 mcg/kg/min, TPN @ 40 ml/hr. 500 cc albumin administered. UOP adequate. Wound vac output minimal. KERRY #1 w/ 35 cc/shift, KERRY #2 w/ 56 cc/shift. NG tube pulled back 15 cm per Dr. Decker. A-line removed @ 1730 d/t poor waveform. Dr. Spivey notified and plans to place another. Pt awakens to voice and follows commands. Remains restrained for safety. Pt turned Q2 on immerse bed w/ pressure points protected, no new skin breakdown noted.  at bedside, POC reviewed and all questions answered.

## 2020-09-19 NOTE — ASSESSMENT & PLAN NOTE
Neuro:  -sedation: precedex/propofol  -analgesia: PRN oxy. Dilaudid  -seroquel 100mg BID  - haldol PRN at night     CV:   - levo gtt, goal MAP > 65mmhg  - repeat TTE, last with EF 30% and WMA on admission     Pulm:  - Intubated  - Duonebs Q6 PRN  Vent Mode: A/C  Oxygen Concentration (%):  [] 40  Resp Rate Total:  [18 br/min-21 br/min] 21 br/min  Vt Set:  [350 mL] 350 mL  PEEP/CPAP:  [5 cmH20-8 cmH20] 8 cmH20  Mean Airway Pressure:  [8.8 lbM48-96 cmH20] 11 cmH20     FEN/GI: Perforated stomach; s/p washout and patch (9/4); abdominal closure (9/6)  - s/p ex-lap on 9/19   - holding tube feeds   - G tube upsized  - Lactulose discontinued (9/14) in setting of normal ammonia level (9/10) and improving mental status  - Daily metabolic panel with PRN repletion of electrolytes  - pantoprazole for ulcer ppx  - Cont TPN     Renal:  - Continue to monitor with daily metabolic labs  - nephrology on board, appreciate recs  - Cr stable @ 1.7     HEME/ID:   - H/H stable    - Daily CBC  - s/p Liver transplant in 2002; Daily tacrolimus level with AM labs. Per hepatology, continue tacrolimus 1mg BID  - Diflucan and Zosyn  - Lovenox for DVT ppx  - Check lactic acid for hypotension     Endo:  - Synthroid 40mcg daily    Dispo:  - Continue SICU care

## 2020-09-19 NOTE — ASSESSMENT & PLAN NOTE
50 y.o. female w/ free air on CT scan. S/p emergent ex lap on 9/4 w/ findings of gastric perforation, primary repair. Now w/  abdominal closure on 9/6, skin stapled closed, KERRY drains removed. Class A to OR on 9/18 for free air and extrav or oral contrast seen on CT     Continue SICU care, appreciate their assistance  G tube upsized, keep to gravity  Zosyn and diflucan for suspected intraabodminal source, WBC improving  IVF resuscitation to wean pressors

## 2020-09-19 NOTE — SUBJECTIVE & OBJECTIVE
Interval History: Class A to the OR for ex lap due to free air on CT and extravasation of oral contrast from the stomach into the peritoneal cavity. Did well overnight. Required pressors. Likely needed for volume resuscitation     Medications:  Continuous Infusions:   dexmedetomidine (PRECEDEX) infusion 1.4 mcg/kg/hr (09/19/20 0900)    norepinephrine bitartrate-D5W 0.07 mcg/kg/min (09/19/20 0900)    propofoL 15 mcg/kg/min (09/19/20 0900)    TPN ADULT CENTRAL LINE CUSTOM 40 mL/hr at 09/19/20 0900    TPN ADULT CENTRAL LINE CUSTOM       Scheduled Meds:   fat emulsion 20%  250 mL Intravenous Daily    fat emulsion 20%  250 mL Intravenous Daily    fluconazole (DIFLUCAN) IVPB  200 mg Intravenous Q24H    heparin (porcine)  5,000 Units Subcutaneous Q8H    pantoprazole  40 mg Intravenous Daily    piperacillin-tazobactam (ZOSYN) IVPB  4.5 g Intravenous Q8H    tacrolimus  0.5 mg Sublingual Daily AM     PRN Meds:sodium chloride, albuterol-ipratropium, albuterol-ipratropium, calcium gluconate IVPB, calcium gluconate IVPB, calcium gluconate IVPB, dextrose 50%, dextrose 50%, glucagon (human recombinant), glucose, glucose, haloperidol lactate, hydrALAZINE, insulin aspart U-100, labetaloL, magnesium sulfate IVPB, magnesium sulfate IVPB, ondansetron, potassium chloride in water **AND** potassium chloride in water **AND** potassium chloride in water, promethazine, propofoL, sodium chloride 0.9%, sodium chloride 0.9%, sodium phosphate IVPB, sodium phosphate IVPB, sodium phosphate IVPB     Review of patient's allergies indicates:   Allergen Reactions    Codeine Itching     Other reaction(s): Itching    Lipitor [atorvastatin] Other (See Comments)     Other reaction(s): Muscle pain  Muscle cranmps    Morphine Itching     Other reaction(s): nausea and vomiting     Zoloft [sertraline] Other (See Comments)     Tremors/muscle spasms     Objective:     Vital Signs (Most Recent):  Temp: 98.9 °F (37.2 °C) (09/19/20 0700)  Pulse:  64 (09/19/20 0845)  Resp: (!) 21 (09/19/20 0845)  BP: (!) 95/54 (09/19/20 0800)  SpO2: 95 % (09/19/20 0845) Vital Signs (24h Range):  Temp:  [97.6 °F (36.4 °C)-98.9 °F (37.2 °C)] 98.9 °F (37.2 °C)  Pulse:  [] 64  Resp:  [19-55] 21  SpO2:  [92 %-99 %] 95 %  BP: ()/(44-65) 95/54  Arterial Line BP: ()/(44-66) 107/48     Weight: 67.6 kg (149 lb)  Body mass index is 30.09 kg/m².    Intake/Output - Last 3 Shifts       09/17 0700 - 09/18 0659 09/18 0700 - 09/19 0659 09/19 0700 - 09/20 0659    I.V. (mL/kg) 547 (8) 3571.4 (52.8)     NG/ 900     IV Piggyback 1000      TPN 1146 1135     Total Intake(mL/kg) 3563 (52.4) 5606.4 (82.9)     Urine (mL/kg/hr) 785 (0.5) 942 (0.6) 85 (0.5)    Drains 800 1805 68    Stool       Total Output 1585 2747 153    Net +1978 +2859.4 -153                 Physical Exam  Constitutional:       General: She is not in acute distress.  Cardiovascular:      Rate and Rhythm: Regular rhythm. Tachycardia present.   Pulmonary:      Effort: Pulmonary effort is normal. No respiratory distress.   Abdominal:      General: There is no distension.      Palpations: Abdomen is soft.      Tenderness: There is no abdominal tenderness.      Comments: Incision c/d/i  Thick drainage around G tube   Neurological:      General: No focal deficit present.      Mental Status: She is alert and oriented to person, place, and time.   Psychiatric:         Mood and Affect: Mood normal.         Behavior: Behavior normal.         Significant Labs:  CBC:   Recent Labs   Lab 09/19/20  0400   WBC 9.30   RBC 2.61*   HGB 7.5*   HCT 24.4*      MCV 94   MCH 28.7   MCHC 30.7*     CMP:   Recent Labs   Lab 09/19/20  0300   *  121*   CALCIUM 8.1*  8.1*   ALBUMIN 1.0*   PROT 4.9*     145   K 4.4  4.4   CO2 17*  17*   *  115*   BUN 73*  73*   CREATININE 1.7*  1.7*   ALKPHOS 217*   ALT 43   AST 62*   BILITOT 2.1*       Significant Diagnostics:  I have reviewed all pertinent imaging  results/findings within the past 24 hours.

## 2020-09-19 NOTE — PROGRESS NOTES
[facility]  Critical Care - Surgery  Progress Note    Patient Name: Elda Hernandez  MRN: [id number]  Admission Date: 9/3/2020  Hospital Length of Stay: 16 days  Code Status: Full Code  Attending Provider: Clark Rodriguez MD  Primary Care Provider: Jordana Woods MD   Principal Problem: Perforated abdominal viscus    Subjective:     Hospital/ICU Course:  9/4: Pt admitted to SICU following ex lap for GI perf. Intubated, sedated. Wound vac in place.  Soon after arrival to unit a mottled appearance to RUE was noted.  Vascular consulted and US revealed R radial artery thrombosis.    9/5: Tachycardic..  R arm appearance greatly improved overnight.    9/6: Patient returned to OR for abdominal washout, closure. R arm with dopplerable signals to ulnar artery and palmar arch.   9/7: Attempted to wean patient off of sedation in an effort to move towards extubation. Patient weaned off of propofol. Precedex started. Patient's heart rate very labile and sensitize to sedation.   9/8: patient extubated  9/9: SHANICE. Labetalol PRN for HTN   9/10:  Diuresis increased.  Cr downtrending  9/11: Increased abdominal distension and elevated WBC count. CT abd  9/12: WBC to 45 today. Diflucan and zosyn initiated. Lactulose enema w/ improving mental status  9/13: WBC improved. Mental status improving. FWF initiated for hypernatremia.   9/14: White count continues to improve.  Oriented to person only this morning.    9/17: tolerating tube feeds, half TPN rate today. Possible step down   9/18: possible fall out of bed overnight. High G tube residual, now to gravity. Requiring comfortflo  9/19: s/p exlap. Intubated ans sedated. Weaning to extubate    Interval History/Significant Events: NAEO. Patient doing well s/p exlap yesterday after found with free air and tube feeds in the abdomen. Currently intubated and sedated. Weaning to extubate.     Follow-up For: Procedure(s) (LRB):  LAPAROTOMY, EXPLORATORY, DRAINAGE OF ABSCESS,  REPAIR OF GASTRIC PERFORATION, GASTROSTOMY TUBE PLACEMENT (N/A)  APPLICATION, WOUND VAC (N/A)    Post-Operative Day: 1 Day Post-Op    Objective:     Vital Signs (Most Recent):  Temp: 97.6 °F (36.4 °C) (09/19/20 0300)  Pulse: 94 (09/19/20 0600)  Resp: 19 (09/19/20 0600)  BP: (!) 103/57 (09/19/20 0600)  SpO2: 95 % (09/19/20 0600) Vital Signs (24h Range):  Temp:  [97.6 °F (36.4 °C)-98.8 °F (37.1 °C)] 97.6 °F (36.4 °C)  Pulse:  [] 94  Resp:  [18-55] 19  SpO2:  [92 %-98 %] 95 %  BP: ()/(44-65) 103/57  Arterial Line BP: ()/(48-66) 107/51     Weight: 67.6 kg (149 lb)  Body mass index is 30.09 kg/m².      Intake/Output Summary (Last 24 hours) at 9/19/2020 0731  Last data filed at 9/19/2020 0600  Gross per 24 hour   Intake 5606.4 ml   Output 2702 ml   Net 2904.4 ml       Physical Exam  Constitutional:       General: She is not in acute distress.     Comments: Sedated   Cardiovascular:      Rate and Rhythm: Regular rhythm. Tachycardia present.   Pulmonary:      Effort: Pulmonary effort is normal. No respiratory distress.      Comments: Intbuated  Abdominal:      General: There is no distension.      Palpations: Abdomen is soft.      Tenderness: There is no abdominal tenderness.      Comments: Incision c/d/i  Thick drainage around G tube   Neurological:      General: No focal deficit present.      Mental Status: She is oriented to person, place, and time.   Psychiatric:         Mood and Affect: Mood normal.         Behavior: Behavior normal.         Vents:  Vent Mode: A/C (09/19/20 0333)  Ventilator Initiated: Yes (09/18/20 1648)  Set Rate: 16 BPM (09/19/20 0333)  Vt Set: 350 mL (09/19/20 0333)  Pressure Support: 5 cmH20 (09/08/20 1607)  PEEP/CPAP: 8 cmH20 (09/19/20 0333)  Oxygen Concentration (%): 40 (09/19/20 0600)  Peak Airway Pressure: 16 cmH2O (09/19/20 0333)  Plateau Pressure: 16 cmH20 (09/18/20 1648)  Total Ve: 9.31 mL (09/19/20 0333)  Negative Inspiratory Force (cm H2O): -35 (09/08/20 1607)  F/VT  Ratio<105 (RSBI): (!) 38.75 (09/19/20 0333)    Lines/Drains/Airways     Central Venous Catheter Line            Percutaneous Central Line Insertion/Assessment - Triple Lumen  09/04/20 0800 right internal jugular 14 days          Drain                 Urethral Catheter 09/14/20 1330 4 days         Closed/Suction Drain 09/18/20 1555 Left Abdomen Bulb 19 Fr. less than 1 day         Closed/Suction Drain 09/18/20 1559 Left Abdomen Bulb 19 Fr. less than 1 day         Gastrostomy/Enterostomy 09/18/20 1612 Other (see comments) LUQ decompression;feeding less than 1 day          Airway                 Airway - Non-Surgical 09/18/20 1519 Endotracheal Tube less than 1 day                Significant Labs:    CBC/Anemia Profile:  Recent Labs   Lab 09/18/20  0013 09/18/20  0300 09/19/20  0400   WBC 20.17* 12.54 9.30   HGB 9.5* 8.6* 7.5*   HCT 30.6* 27.3* 24.4*    160 155   MCV 94 93 94   RDW 18.1* 18.1* 17.6*        Chemistries:  Recent Labs   Lab 09/18/20  0013 09/18/20  0300 09/18/20  1435 09/19/20  0300   * 146* 145 145  145   K 4.8 4.7 4.7 4.4  4.4   * 115* 115* 115*  115*   CO2 19* 19* 21* 17*  17*   BUN 59* 65* 75* 73*  73*   CREATININE 1.6* 1.7* 1.7* 1.7*  1.7*   CALCIUM 9.1 8.5* 8.4* 8.1*  8.1*   ALBUMIN 1.4* 1.3*  --  1.0*   PROT 5.7* 5.1*  --  4.9*   BILITOT 3.5* 3.0*  --  2.1*   ALKPHOS 254* 232*  --  217*   ALT 37 36  --  43   AST 41* 40  --  62*   MG  --  2.2  --  2.2   PHOS  --  3.9  --  5.4*       All pertinent labs within the past 24 hours have been reviewed.    Significant Imaging:  I have reviewed all pertinent imaging results/findings within the past 24 hours.    Assessment/Plan:     * Perforated abdominal viscus  Neuro:  -sedation: precedex/propofol  -analgesia: PRN oxy. Dilaudid  -seroquel 100mg BID  - haldol PRN at night     CV:   - levo gtt, goal MAP > 65mmhg  - repeat TTE, last with EF 30% and WMA on admission     Pulm:  - Intubated  - Duonebs Q6 PRN  Vent Mode: A/C  Oxygen  Concentration (%):  [] 40  Resp Rate Total:  [18 br/min-21 br/min] 21 br/min  Vt Set:  [350 mL] 350 mL  PEEP/CPAP:  [5 cmH20-8 cmH20] 8 cmH20  Mean Airway Pressure:  [8.8 gyF30-74 cmH20] 11 cmH20     FEN/GI: Perforated stomach; s/p washout and patch (9/4); abdominal closure (9/6)  - s/p ex-lap on 9/19   - holding tube feeds   - G tube upsized  - Lactulose discontinued (9/14) in setting of normal ammonia level (9/10) and improving mental status  - Daily metabolic panel with PRN repletion of electrolytes  - pantoprazole for ulcer ppx  - Cont TPN     Renal:  - Continue to monitor with daily metabolic labs  - nephrology on board, appreciate recs  - Cr stable @ 1.7     HEME/ID:   - H/H stable    - Daily CBC  - s/p Liver transplant in 2002; Daily tacrolimus level with AM labs. Per hepatology, continue tacrolimus 1mg BID  - Diflucan and Zosyn  - Lovenox for DVT ppx  - Check lactic acid for hypotension     Endo:  - Synthroid 40mcg daily    Dispo:  - Continue SICU care      Critical care was time spent personally by me on the following activities: development of treatment plan with patient or surrogate and bedside caregivers, discussions with consultants, evaluation of patient's response to treatment, examination of patient, ordering and performing treatments and interventions, ordering and review of laboratory studies, ordering and review of radiographic studies, pulse oximetry, re-evaluation of patient's condition.  This critical care time did not overlap with that of any other provider or involve time for any procedures.     Gurpreet Price MD  Critical Care - Surgery  [facility]

## 2020-09-20 LAB
ALBUMIN SERPL BCP-MCNC: 1.4 G/DL (ref 3.5–5.2)
ALP SERPL-CCNC: 249 U/L (ref 55–135)
ALT SERPL W/O P-5'-P-CCNC: 36 U/L (ref 10–44)
ANION GAP SERPL CALC-SCNC: 14 MMOL/L (ref 8–16)
ANISOCYTOSIS BLD QL SMEAR: SLIGHT
APTT BLDCRRT: 30 SEC (ref 21–32)
AST SERPL-CCNC: 43 U/L (ref 10–40)
BASOPHILS # BLD AUTO: 0.03 K/UL (ref 0–0.2)
BASOPHILS NFR BLD: 0.4 % (ref 0–1.9)
BILIRUB SERPL-MCNC: 2.3 MG/DL (ref 0.1–1)
BUN SERPL-MCNC: 67 MG/DL (ref 6–20)
CALCIUM SERPL-MCNC: 8 MG/DL (ref 8.7–10.5)
CHLORIDE SERPL-SCNC: 116 MMOL/L (ref 95–110)
CO2 SERPL-SCNC: 17 MMOL/L (ref 23–29)
CREAT SERPL-MCNC: 1.5 MG/DL (ref 0.5–1.4)
DIFFERENTIAL METHOD: ABNORMAL
EOSINOPHIL # BLD AUTO: 0.4 K/UL (ref 0–0.5)
EOSINOPHIL NFR BLD: 4.4 % (ref 0–8)
ERYTHROCYTE [DISTWIDTH] IN BLOOD BY AUTOMATED COUNT: 17.3 % (ref 11.5–14.5)
EST. GFR  (AFRICAN AMERICAN): 45.8 ML/MIN/1.73 M^2
EST. GFR  (NON AFRICAN AMERICAN): 39.8 ML/MIN/1.73 M^2
GLUCOSE SERPL-MCNC: 133 MG/DL (ref 70–110)
HCT VFR BLD AUTO: 23.1 % (ref 37–48.5)
HGB BLD-MCNC: 7.2 G/DL (ref 12–16)
HYPOCHROMIA BLD QL SMEAR: ABNORMAL
IMM GRANULOCYTES # BLD AUTO: 0.2 K/UL (ref 0–0.04)
IMM GRANULOCYTES NFR BLD AUTO: 2.5 % (ref 0–0.5)
INR PPP: 1.1 (ref 0.8–1.2)
LYMPHOCYTES # BLD AUTO: 1 K/UL (ref 1–4.8)
LYMPHOCYTES NFR BLD: 12 % (ref 18–48)
MAGNESIUM SERPL-MCNC: 2.2 MG/DL (ref 1.6–2.6)
MCH RBC QN AUTO: 28.5 PG (ref 27–31)
MCHC RBC AUTO-ENTMCNC: 31.2 G/DL (ref 32–36)
MCV RBC AUTO: 91 FL (ref 82–98)
MONOCYTES # BLD AUTO: 0.6 K/UL (ref 0.3–1)
MONOCYTES NFR BLD: 7.9 % (ref 4–15)
NEUTROPHILS # BLD AUTO: 5.8 K/UL (ref 1.8–7.7)
NEUTROPHILS NFR BLD: 72.8 % (ref 38–73)
NRBC BLD-RTO: 1 /100 WBC
PHOSPHATE SERPL-MCNC: 4.6 MG/DL (ref 2.7–4.5)
PLATELET # BLD AUTO: 161 K/UL (ref 150–350)
PLATELET BLD QL SMEAR: ABNORMAL
PMV BLD AUTO: 12.1 FL (ref 9.2–12.9)
POCT GLUCOSE: 138 MG/DL (ref 70–110)
POCT GLUCOSE: 138 MG/DL (ref 70–110)
POCT GLUCOSE: 150 MG/DL (ref 70–110)
POCT GLUCOSE: 151 MG/DL (ref 70–110)
POLYCHROMASIA BLD QL SMEAR: ABNORMAL
POTASSIUM SERPL-SCNC: 3.7 MMOL/L (ref 3.5–5.1)
PROT SERPL-MCNC: 5.2 G/DL (ref 6–8.4)
PROTHROMBIN TIME: 12 SEC (ref 9–12.5)
RBC # BLD AUTO: 2.53 M/UL (ref 4–5.4)
SODIUM SERPL-SCNC: 147 MMOL/L (ref 136–145)
TACROLIMUS BLD-MCNC: 2.5 NG/ML (ref 5–15)
WBC # BLD AUTO: 8 K/UL (ref 3.9–12.7)

## 2020-09-20 PROCEDURE — C9113 INJ PANTOPRAZOLE SODIUM, VIA: HCPCS | Performed by: STUDENT IN AN ORGANIZED HEALTH CARE EDUCATION/TRAINING PROGRAM

## 2020-09-20 PROCEDURE — 99291 CRITICAL CARE FIRST HOUR: CPT | Mod: 24,GC,, | Performed by: SURGERY

## 2020-09-20 PROCEDURE — 63600175 PHARM REV CODE 636 W HCPCS: Performed by: STUDENT IN AN ORGANIZED HEALTH CARE EDUCATION/TRAINING PROGRAM

## 2020-09-20 PROCEDURE — 99900035 HC TECH TIME PER 15 MIN (STAT)

## 2020-09-20 PROCEDURE — 25000003 PHARM REV CODE 250: Performed by: STUDENT IN AN ORGANIZED HEALTH CARE EDUCATION/TRAINING PROGRAM

## 2020-09-20 PROCEDURE — 63600175 PHARM REV CODE 636 W HCPCS: Performed by: SURGERY

## 2020-09-20 PROCEDURE — B4185 PARENTERAL SOL 10 GM LIPIDS: HCPCS | Performed by: STUDENT IN AN ORGANIZED HEALTH CARE EDUCATION/TRAINING PROGRAM

## 2020-09-20 PROCEDURE — 99291 PR CRITICAL CARE, E/M 30-74 MINUTES: ICD-10-PCS | Mod: 24,GC,, | Performed by: SURGERY

## 2020-09-20 PROCEDURE — 20000000 HC ICU ROOM

## 2020-09-20 PROCEDURE — 27200966 HC CLOSED SUCTION SYSTEM

## 2020-09-20 PROCEDURE — 80197 ASSAY OF TACROLIMUS: CPT

## 2020-09-20 PROCEDURE — 94640 AIRWAY INHALATION TREATMENT: CPT

## 2020-09-20 PROCEDURE — 85610 PROTHROMBIN TIME: CPT

## 2020-09-20 PROCEDURE — 94761 N-INVAS EAR/PLS OXIMETRY MLT: CPT

## 2020-09-20 PROCEDURE — 25000242 PHARM REV CODE 250 ALT 637 W/ HCPCS: Performed by: STUDENT IN AN ORGANIZED HEALTH CARE EDUCATION/TRAINING PROGRAM

## 2020-09-20 PROCEDURE — 99900026 HC AIRWAY MAINTENANCE (STAT)

## 2020-09-20 PROCEDURE — A4217 STERILE WATER/SALINE, 500 ML: HCPCS | Performed by: STUDENT IN AN ORGANIZED HEALTH CARE EDUCATION/TRAINING PROGRAM

## 2020-09-20 PROCEDURE — 85730 THROMBOPLASTIN TIME PARTIAL: CPT

## 2020-09-20 PROCEDURE — 27000221 HC OXYGEN, UP TO 24 HOURS

## 2020-09-20 PROCEDURE — 27000644 HC VENT IN-LINE ADAPTER

## 2020-09-20 PROCEDURE — 94003 VENT MGMT INPAT SUBQ DAY: CPT

## 2020-09-20 PROCEDURE — 80053 COMPREHEN METABOLIC PANEL: CPT

## 2020-09-20 PROCEDURE — 84100 ASSAY OF PHOSPHORUS: CPT

## 2020-09-20 PROCEDURE — 83735 ASSAY OF MAGNESIUM: CPT

## 2020-09-20 PROCEDURE — 25000003 PHARM REV CODE 250: Performed by: SURGERY

## 2020-09-20 PROCEDURE — 85025 COMPLETE CBC W/AUTO DIFF WBC: CPT

## 2020-09-20 RX ORDER — ACETAMINOPHEN 120 MG/1
60 SUPPOSITORY RECTAL EVERY 6 HOURS PRN
Status: DISCONTINUED | OUTPATIENT
Start: 2020-09-20 | End: 2020-09-20

## 2020-09-20 RX ORDER — SODIUM CHLORIDE, SODIUM LACTATE, POTASSIUM CHLORIDE, CALCIUM CHLORIDE 600; 310; 30; 20 MG/100ML; MG/100ML; MG/100ML; MG/100ML
INJECTION, SOLUTION INTRAVENOUS CONTINUOUS
Status: DISCONTINUED | OUTPATIENT
Start: 2020-09-20 | End: 2020-09-22

## 2020-09-20 RX ORDER — ACETAMINOPHEN 325 MG/1
650 TABLET ORAL EVERY 6 HOURS PRN
Status: DISCONTINUED | OUTPATIENT
Start: 2020-09-20 | End: 2020-09-30

## 2020-09-20 RX ADMIN — HEPARIN SODIUM 5000 UNITS: 5000 INJECTION INTRAVENOUS; SUBCUTANEOUS at 09:09

## 2020-09-20 RX ADMIN — I.V. FAT EMULSION 250 ML: 20 EMULSION INTRAVENOUS at 09:09

## 2020-09-20 RX ADMIN — FLUCONAZOLE, SODIUM CHLORIDE 200 MG: 2 INJECTION INTRAVENOUS at 03:09

## 2020-09-20 RX ADMIN — DEXMEDETOMIDINE HYDROCHLORIDE 0.6 MCG/KG/HR: 4 INJECTION, SOLUTION INTRAVENOUS at 09:09

## 2020-09-20 RX ADMIN — SODIUM CHLORIDE, SODIUM LACTATE, POTASSIUM CHLORIDE, AND CALCIUM CHLORIDE: .6; .31; .03; .02 INJECTION, SOLUTION INTRAVENOUS at 11:09

## 2020-09-20 RX ADMIN — PIPERACILLIN SODIUM AND TAZOBACTAM SODIUM 4.5 G: 4; .5 INJECTION, POWDER, LYOPHILIZED, FOR SOLUTION INTRAVENOUS at 09:09

## 2020-09-20 RX ADMIN — IPRATROPIUM BROMIDE AND ALBUTEROL SULFATE 3 ML: .5; 2.5 SOLUTION RESPIRATORY (INHALATION) at 04:09

## 2020-09-20 RX ADMIN — SODIUM CHLORIDE, SODIUM LACTATE, POTASSIUM CHLORIDE, AND CALCIUM CHLORIDE 1000 ML: .6; .31; .03; .02 INJECTION, SOLUTION INTRAVENOUS at 10:09

## 2020-09-20 RX ADMIN — PANTOPRAZOLE SODIUM 40 MG: 40 INJECTION, POWDER, LYOPHILIZED, FOR SOLUTION INTRAVENOUS at 09:09

## 2020-09-20 RX ADMIN — TACROLIMUS 0.5 MG: 1 CAPSULE ORAL at 07:09

## 2020-09-20 RX ADMIN — ACETAMINOPHEN 650 MG: 325 TABLET ORAL at 09:09

## 2020-09-20 RX ADMIN — Medication 0.08 MCG/KG/MIN: at 03:09

## 2020-09-20 RX ADMIN — PIPERACILLIN SODIUM AND TAZOBACTAM SODIUM 4.5 G: 4; .5 INJECTION, POWDER, LYOPHILIZED, FOR SOLUTION INTRAVENOUS at 05:09

## 2020-09-20 RX ADMIN — MAGNESIUM SULFATE HEPTAHYDRATE: 500 INJECTION, SOLUTION INTRAMUSCULAR; INTRAVENOUS at 09:09

## 2020-09-20 RX ADMIN — DEXMEDETOMIDINE HYDROCHLORIDE 0.8 MCG/KG/HR: 4 INJECTION, SOLUTION INTRAVENOUS at 12:09

## 2020-09-20 RX ADMIN — HEPARIN SODIUM 5000 UNITS: 5000 INJECTION INTRAVENOUS; SUBCUTANEOUS at 06:09

## 2020-09-20 RX ADMIN — POTASSIUM CHLORIDE 40 MEQ: 29.8 INJECTION, SOLUTION INTRAVENOUS at 07:09

## 2020-09-20 RX ADMIN — INSULIN ASPART 1 UNITS: 100 INJECTION, SOLUTION INTRAVENOUS; SUBCUTANEOUS at 07:09

## 2020-09-20 RX ADMIN — PIPERACILLIN SODIUM AND TAZOBACTAM SODIUM 4.5 G: 4; .5 INJECTION, POWDER, LYOPHILIZED, FOR SOLUTION INTRAVENOUS at 02:09

## 2020-09-20 RX ADMIN — HEPARIN SODIUM 5000 UNITS: 5000 INJECTION INTRAVENOUS; SUBCUTANEOUS at 02:09

## 2020-09-20 RX ADMIN — PROPOFOL 20 MCG/KG/MIN: 10 INJECTION, EMULSION INTRAVENOUS at 05:09

## 2020-09-20 NOTE — SUBJECTIVE & OBJECTIVE
Interval History/Significant Events: No acute events overnight. Pt remains intubated, sedated on precedex and propofol, requiring pressors. Continued output from drains, G-tube and NG. Plan to wean sedation, vent settings today.     Follow-up For: Procedure(s) (LRB):  LAPAROTOMY, EXPLORATORY, DRAINAGE OF ABSCESS, REPAIR OF GASTRIC PERFORATION, GASTROSTOMY TUBE PLACEMENT (N/A)  APPLICATION, WOUND VAC (N/A)    Post-Operative Day: 2 Days Post-Op    Objective:     Vital Signs (Most Recent):  Temp: 99.2 °F (37.3 °C) (09/20/20 0300)  Pulse: 67 (09/20/20 0500)  Resp: (!) 25 (09/20/20 0500)  BP: (!) 141/63 (09/19/20 2300)  SpO2: (!) 93 % (09/20/20 0500) Vital Signs (24h Range):  Temp:  [98.6 °F (37 °C)-99.2 °F (37.3 °C)] 99.2 °F (37.3 °C)  Pulse:  [62-77] 67  Resp:  [18-47] 25  SpO2:  [92 %-97 %] 93 %  BP: ()/(53-63) 141/63  Arterial Line BP: ()/(47-74) 101/57     Weight: 67.6 kg (149 lb)  Body mass index is 30.09 kg/m².      Intake/Output Summary (Last 24 hours) at 9/20/2020 0722  Last data filed at 9/20/2020 0500  Gross per 24 hour   Intake 2408.7 ml   Output 1398 ml   Net 1010.7 ml       Physical Exam  Constitutional:       Comments: Pt sedated, intubated   HENT:      Head: Normocephalic and atraumatic.      Nose:      Comments: NG tube in place     Mouth/Throat:      Mouth: Mucous membranes are moist.   Cardiovascular:      Rate and Rhythm: Normal rate and regular rhythm.   Pulmonary:      Comments: Intubated, mechanically ventilated  Abdominal:      Comments: Midline incision with wound vac in place; excellent seal to wound vac.   L abd drains in place with serous output; G tube with serous output;      Genitourinary:     Comments: L groin with improving ecchymosis    Skin:     General: Skin is warm and dry.   Neurological:      Comments: Patient remains sedated         Vents:  Vent Mode: A/C (09/20/20 0339)  Ventilator Initiated: Yes (09/18/20 1648)  Set Rate: 16 BPM (09/20/20 0339)  Vt Set: 350 mL  (09/20/20 0339)  Pressure Support: 5 cmH20 (09/08/20 1607)  PEEP/CPAP: 8 cmH20 (09/20/20 0339)  Oxygen Concentration (%): 40 (09/20/20 0500)  Peak Airway Pressure: 22 cmH2O (09/20/20 0339)  Plateau Pressure: 22 cmH20 (09/20/20 0339)  Total Ve: 10.3 mL (09/20/20 0339)  Negative Inspiratory Force (cm H2O): -35 (09/08/20 1607)  F/VT Ratio<105 (RSBI): (!) 74.79 (09/20/20 0339)    Lines/Drains/Airways     Central Venous Catheter Line            Percutaneous Central Line Insertion/Assessment - Triple Lumen  09/04/20 0800 right internal jugular 15 days          Drain                 Urethral Catheter 09/14/20 1330 5 days         Closed/Suction Drain 09/18/20 1555 Left Abdomen Bulb 19 Fr. 1 day         Closed/Suction Drain 09/18/20 1559 Left Abdomen Bulb 19 Fr. 1 day         Gastrostomy/Enterostomy 09/18/20 1612 Other (see comments) LUQ decompression;feeding 1 day          Airway                 Airway - Non-Surgical 09/18/20 1519 Endotracheal Tube 1 day          Arterial Line            Arterial Line 09/19/20 1849 Left Radial less than 1 day                Significant Labs:    CBC/Anemia Profile:  Recent Labs   Lab 09/19/20  0400 09/20/20  0300   WBC 9.30 8.00   HGB 7.5* 7.2*   HCT 24.4* 23.1*    161   MCV 94 91   RDW 17.6* 17.3*        Chemistries:  Recent Labs   Lab 09/18/20  1435 09/19/20  0300 09/19/20  1147 09/20/20  0300    145  145  --  147*   K 4.7 4.4  4.4  --  3.7   * 115*  115*  --  116*   CO2 21* 17*  17*  --  17*   BUN 75* 73*  73*  --  67*   CREATININE 1.7* 1.7*  1.7*  --  1.5*   CALCIUM 8.4* 8.1*  8.1*  --  8.0*   ALBUMIN  --  1.0*  --  1.4*   PROT  --  4.9*  --  5.2*   BILITOT  --  2.1*  --  2.3*   ALKPHOS  --  217*  --  249*   ALT  --  43  --  36   AST  --  62*  --  43*   MG  --  2.2  --  2.2   PHOS  --  5.4* 4.7* 4.6*       All pertinent labs within the past 24 hours have been reviewed.    Significant Imaging:  I have reviewed all pertinent imaging results/findings within the  past 24 hours.

## 2020-09-20 NOTE — PROGRESS NOTES
[facility]  Critical Care - Surgery  Progress Note    Patient Name: Elda Hernandez  MRN: [id number]  Admission Date: 9/3/2020  Hospital Length of Stay: 17 days  Code Status: Full Code  Attending Provider: Clark Rodriguez MD  Primary Care Provider: Jordana Woods MD   Principal Problem: Perforated abdominal viscus    Subjective:     Hospital/ICU Course:  9/4: Pt admitted to SICU following ex lap for GI perf. Intubated, sedated. Wound vac in place.  Soon after arrival to unit a mottled appearance to RUE was noted.  Vascular consulted and US revealed R radial artery thrombosis.    9/5: Tachycardic..  R arm appearance greatly improved overnight.    9/6: Patient returned to OR for abdominal washout, closure. R arm with dopplerable signals to ulnar artery and palmar arch.   9/7: Attempted to wean patient off of sedation in an effort to move towards extubation. Patient weaned off of propofol. Precedex started. Patient's heart rate very labile and sensitize to sedation.   9/8: patient extubated  9/9: SHANICE. Labetalol PRN for HTN   9/10:  Diuresis increased.  Cr downtrending  9/11: Increased abdominal distension and elevated WBC count. CT abd  9/12: WBC to 45 today. Diflucan and zosyn initiated. Lactulose enema w/ improving mental status  9/13: WBC improved. Mental status improving. FWF initiated for hypernatremia.   9/14: White count continues to improve.  Oriented to person only this morning.    9/17: tolerating tube feeds, half TPN rate today. Possible step down   9/18: possible fall out of bed overnight. High G tube residual, now to gravity. Requiring comfortflo  9/19: s/p exlap. Intubated ans sedated. Weaning to extubate  9/20: No acute events overnight. Pt remains intubated, sedated. Plan to wean sedation for extubation.    Interval History/Significant Events: No acute events overnight. Pt remains intubated, sedated on precedex and propofol, requiring pressors. Continued output from drains,  G-tube and NG. Plan to wean sedation, vent settings today.     Follow-up For: Procedure(s) (LRB):  LAPAROTOMY, EXPLORATORY, DRAINAGE OF ABSCESS, REPAIR OF GASTRIC PERFORATION, GASTROSTOMY TUBE PLACEMENT (N/A)  APPLICATION, WOUND VAC (N/A)    Post-Operative Day: 2 Days Post-Op    Objective:     Vital Signs (Most Recent):  Temp: 99.2 °F (37.3 °C) (09/20/20 0300)  Pulse: 67 (09/20/20 0500)  Resp: (!) 25 (09/20/20 0500)  BP: (!) 141/63 (09/19/20 2300)  SpO2: (!) 93 % (09/20/20 0500) Vital Signs (24h Range):  Temp:  [98.6 °F (37 °C)-99.2 °F (37.3 °C)] 99.2 °F (37.3 °C)  Pulse:  [62-77] 67  Resp:  [18-47] 25  SpO2:  [92 %-97 %] 93 %  BP: ()/(53-63) 141/63  Arterial Line BP: ()/(47-74) 101/57     Weight: 67.6 kg (149 lb)  Body mass index is 30.09 kg/m².      Intake/Output Summary (Last 24 hours) at 9/20/2020 0722  Last data filed at 9/20/2020 0500  Gross per 24 hour   Intake 2408.7 ml   Output 1398 ml   Net 1010.7 ml       Physical Exam  Constitutional:       Comments: Pt sedated, intubated   HENT:      Head: Normocephalic and atraumatic.      Nose:      Comments: NG tube in place     Mouth/Throat:      Mouth: Mucous membranes are moist.   Cardiovascular:      Rate and Rhythm: Normal rate and regular rhythm.   Pulmonary:      Comments: Intubated, mechanically ventilated  Abdominal:      Comments: Midline incision with wound vac in place; excellent seal to wound vac.   L abd drains in place with serous output; G tube with serous output;      Genitourinary:     Comments: L groin with improving ecchymosis    Skin:     General: Skin is warm and dry.   Neurological:      Comments: Patient remains sedated         Vents:  Vent Mode: A/C (09/20/20 0339)  Ventilator Initiated: Yes (09/18/20 1648)  Set Rate: 16 BPM (09/20/20 0339)  Vt Set: 350 mL (09/20/20 0339)  Pressure Support: 5 cmH20 (09/08/20 1607)  PEEP/CPAP: 8 cmH20 (09/20/20 0339)  Oxygen Concentration (%): 40 (09/20/20 0500)  Peak Airway Pressure: 22 cmH2O  (09/20/20 0339)  Plateau Pressure: 22 cmH20 (09/20/20 0339)  Total Ve: 10.3 mL (09/20/20 0339)  Negative Inspiratory Force (cm H2O): -35 (09/08/20 1607)  F/VT Ratio<105 (RSBI): (!) 74.79 (09/20/20 0339)    Lines/Drains/Airways     Central Venous Catheter Line            Percutaneous Central Line Insertion/Assessment - Triple Lumen  09/04/20 0800 right internal jugular 15 days          Drain                 Urethral Catheter 09/14/20 1330 5 days         Closed/Suction Drain 09/18/20 1555 Left Abdomen Bulb 19 Fr. 1 day         Closed/Suction Drain 09/18/20 1559 Left Abdomen Bulb 19 Fr. 1 day         Gastrostomy/Enterostomy 09/18/20 1612 Other (see comments) LUQ decompression;feeding 1 day          Airway                 Airway - Non-Surgical 09/18/20 1519 Endotracheal Tube 1 day          Arterial Line            Arterial Line 09/19/20 1849 Left Radial less than 1 day                Significant Labs:    CBC/Anemia Profile:  Recent Labs   Lab 09/19/20  0400 09/20/20  0300   WBC 9.30 8.00   HGB 7.5* 7.2*   HCT 24.4* 23.1*    161   MCV 94 91   RDW 17.6* 17.3*        Chemistries:  Recent Labs   Lab 09/18/20  1435 09/19/20  0300 09/19/20  1147 09/20/20  0300    145  145  --  147*   K 4.7 4.4  4.4  --  3.7   * 115*  115*  --  116*   CO2 21* 17*  17*  --  17*   BUN 75* 73*  73*  --  67*   CREATININE 1.7* 1.7*  1.7*  --  1.5*   CALCIUM 8.4* 8.1*  8.1*  --  8.0*   ALBUMIN  --  1.0*  --  1.4*   PROT  --  4.9*  --  5.2*   BILITOT  --  2.1*  --  2.3*   ALKPHOS  --  217*  --  249*   ALT  --  43  --  36   AST  --  62*  --  43*   MG  --  2.2  --  2.2   PHOS  --  5.4* 4.7* 4.6*       All pertinent labs within the past 24 hours have been reviewed.    Significant Imaging:  I have reviewed all pertinent imaging results/findings within the past 24 hours.    Assessment/Plan:     * Perforated abdominal viscus  Neuro:  -sedation: precedex, propofol  -analgesia: PRN oxy. Dilaudid  - haldol q6hr PRN for agitation  -  plan to wean sedation today     CV:   - levo gtt, goal MAP > 65mmhg  - repeat TTE, last with EF 30% and WMA on admission     Pulm:  - Intubated, mechanically ventilated  - plan to wean vent settings for extubation  - Duonebs Q6 PRN  Vent Mode: A/C  Oxygen Concentration (%):  [40] 40  Resp Rate Total:  [20 br/min-29 br/min] 25 br/min  Vt Set:  [350 mL] 350 mL  PEEP/CPAP:  [8 cmH20] 8 cmH20  Mean Airway Pressure:  [9.9 cbC06-86 cmH20] 11 cmH20     FEN/GI: Perforated stomach; s/p washout and patch (9/4); abdominal closure (9/6)  - s/p ex-lap on 9/19   - continue to hold tube feeds at this time  - NG tube, G tube, and L abdominal drains in place with serous output  - G tube upsized  - Lactulose discontinued (9/14) in setting of normal ammonia level (9/10) and improving mental status  - Daily metabolic panel with PRN repletion of electrolytes  - pantoprazole for ulcer ppx  - Cont TPN     Renal:  - Continue to monitor with daily metabolic labs  - nephrology on board, appreciate recs  - Cr 1.5 today     HEME/ID:   - H/H stable    - Daily CBC  - s/p Liver transplant in 2002; Daily tacrolimus level with AM labs. Per hepatology, continue tacrolimus 1mg BID  - febrile to 102 this am; acetaminophen PRN  - Diflucan and Zosyn  - heparin for DVT ppx       Endo:  - Synthroid 40mcg daily  - insulin aspart for blood glucose control    Dispo:  - Continue SICU care      Critical care was time spent personally by me on the following activities: development of treatment plan with patient or surrogate and bedside caregivers, discussions with consultants, evaluation of patient's response to treatment, examination of patient, ordering and performing treatments and interventions, ordering and review of laboratory studies, ordering and review of radiographic studies, pulse oximetry, re-evaluation of patient's condition.  This critical care time did not overlap with that of any other provider or involve time for any procedures.     Jakob GAINES  MD Brenda  Critical Care - Surgery  [facility]

## 2020-09-20 NOTE — ASSESSMENT & PLAN NOTE
Neuro:  -sedation: precedex, propofol  -analgesia: PRN oxy. Dilaudid  - haldol q6hr PRN for agitation  - plan to wean sedation today     CV:   - levo gtt, goal MAP > 65mmhg  - repeat TTE, last with EF 30% and WMA on admission     Pulm:  - Intubated, mechanically ventilated  - plan to wean vent settings for extubation  - Duonebs Q6 PRN  Vent Mode: A/C  Oxygen Concentration (%):  [40] 40  Resp Rate Total:  [20 br/min-29 br/min] 25 br/min  Vt Set:  [350 mL] 350 mL  PEEP/CPAP:  [8 cmH20] 8 cmH20  Mean Airway Pressure:  [9.9 feS02-40 cmH20] 11 cmH20     FEN/GI: Perforated stomach; s/p washout and patch (9/4); abdominal closure (9/6)  - s/p ex-lap on 9/19   - continue to hold tube feeds at this time  - NG tube, G tube, and L abdominal drains in place with serous output  - G tube upsized  - Lactulose discontinued (9/14) in setting of normal ammonia level (9/10) and improving mental status  - Daily metabolic panel with PRN repletion of electrolytes  - pantoprazole for ulcer ppx  - Cont TPN     Renal:  - Continue to monitor with daily metabolic labs  - nephrology on board, appreciate recs  - Cr 1.5 today     HEME/ID:   - H/H stable    - Daily CBC  - s/p Liver transplant in 2002; Daily tacrolimus level with AM labs. Per hepatology, continue tacrolimus 1mg BID  - febrile to 102 this am; acetaminophen PRN  - Diflucan and Zosyn  - heparin for DVT ppx       Endo:  - Synthroid 40mcg daily  - insulin aspart for blood glucose control    Dispo:  - Continue SICU care

## 2020-09-20 NOTE — SUBJECTIVE & OBJECTIVE
Interval History: NAEON.  Low doses of levophed on propofol and precedex     Medications:  Continuous Infusions:   dexmedetomidine (PRECEDEX) infusion 0.8 mcg/kg/hr (09/20/20 0400)    norepinephrine bitartrate-D5W 0.05 mcg/kg/min (09/20/20 0500)    propofoL 20 mcg/kg/min (09/20/20 0500)    TPN ADULT CENTRAL LINE CUSTOM 40 mL/hr at 09/19/20 2200     Scheduled Meds:   fat emulsion 20%  250 mL Intravenous Daily    fluconazole (DIFLUCAN) IVPB  200 mg Intravenous Q24H    heparin (porcine)  5,000 Units Subcutaneous Q8H    pantoprazole  40 mg Intravenous Daily    piperacillin-tazobactam (ZOSYN) IVPB  4.5 g Intravenous Q8H    tacrolimus  0.5 mg Sublingual Daily AM     PRN Meds:sodium chloride, albuterol-ipratropium, albuterol-ipratropium, calcium gluconate IVPB, calcium gluconate IVPB, calcium gluconate IVPB, dextrose 50%, dextrose 50%, glucagon (human recombinant), glucose, glucose, haloperidol lactate, hydrALAZINE, insulin aspart U-100, labetaloL, magnesium sulfate IVPB, magnesium sulfate IVPB, ondansetron, potassium chloride in water **AND** potassium chloride in water **AND** potassium chloride in water, promethazine, propofoL, sodium chloride 0.9%, sodium chloride 0.9%, sodium phosphate IVPB, sodium phosphate IVPB, sodium phosphate IVPB     Review of patient's allergies indicates:   Allergen Reactions    Codeine Itching     Other reaction(s): Itching    Lipitor [atorvastatin] Other (See Comments)     Other reaction(s): Muscle pain  Muscle cranmps    Morphine Itching     Other reaction(s): nausea and vomiting     Zoloft [sertraline] Other (See Comments)     Tremors/muscle spasms     Objective:     Vital Signs (Most Recent):  Temp: 99.2 °F (37.3 °C) (09/20/20 0300)  Pulse: 67 (09/20/20 0500)  Resp: (!) 25 (09/20/20 0500)  BP: (!) 141/63 (09/19/20 2300)  SpO2: (!) 93 % (09/20/20 0500) Vital Signs (24h Range):  Temp:  [98.6 °F (37 °C)-99.2 °F (37.3 °C)] 99.2 °F (37.3 °C)  Pulse:  [62-77] 67  Resp:  [18-47]  25  SpO2:  [92 %-97 %] 93 %  BP: ()/(53-63) 141/63  Arterial Line BP: ()/(44-74) 101/57     Weight: 67.6 kg (149 lb)  Body mass index is 30.09 kg/m².    Intake/Output - Last 3 Shifts       09/18 0700 - 09/19 0659 09/19 0700 - 09/20 0659 09/20 0700 - 09/21 0659    I.V. (mL/kg) 3571.4 (52.8) 1225.7 (18.1)     NG/ 110     IV Piggyback  200     TPN 1135 873     Total Intake(mL/kg) 5606.4 (82.9) 2408.7 (35.6)     Urine (mL/kg/hr) 942 (0.6) 1245 (0.8)     Drains 1805 246     Total Output 2747 1491     Net +2859.4 +917.7                  Physical Exam  Constitutional:       General: She is not in acute distress.  Cardiovascular:      Rate and Rhythm: Regular rhythm. Tachycardia present.   Pulmonary:      Effort: Pulmonary effort is normal. No respiratory distress.   Abdominal:      General: There is no distension.      Palpations: Abdomen is soft.      Tenderness: There is no abdominal tenderness.      Comments: Incision c/d/i  Thick drainage around G tube   Neurological:      General: No focal deficit present.      Mental Status: She is alert and oriented to person, place, and time.   Psychiatric:         Mood and Affect: Mood normal.         Behavior: Behavior normal.         Significant Labs:  CBC:   Recent Labs   Lab 09/20/20  0300   WBC 8.00   RBC 2.53*   HGB 7.2*   HCT 23.1*      MCV 91   MCH 28.5   MCHC 31.2*     CMP:   Recent Labs   Lab 09/20/20  0300   *   CALCIUM 8.0*   ALBUMIN 1.4*   PROT 5.2*   *   K 3.7   CO2 17*   *   BUN 67*   CREATININE 1.5*   ALKPHOS 249*   ALT 36   AST 43*   BILITOT 2.3*       Significant Diagnostics:  I have reviewed all pertinent imaging results/findings within the past 24 hours.

## 2020-09-20 NOTE — PLAN OF CARE
Pt on AC/VC 40% 8 PEEP. Tmax 102 F, tylenol administered. MAP maintained >65 w/ levo gtt @ 0.08 mcg/kg/hr. Other gtts include precedex @ 0.6 mcg/kg/hr, propofol @ 20 mcg/kg/min, TPN @ 40 ml/hr, LR gtt started @ 50 ml/hr, 1L LR bolus administered. UOP adequate. Wound vac output minimal. KERRY #1 w/ 14 cc/shift, KERRY #2 w/ 100 cc/shift. NG tube pulled back 10 cm per Dr. Decker. Pt awakens to voice and follows commands. Remains restrained for safety. Pt turned Q2 on immerse bed w/ pressure points protected, no new skin breakdown noted.  at bedside, POC reviewed and all questions answered.

## 2020-09-20 NOTE — ASSESSMENT & PLAN NOTE
50 y.o. female w/ free air on CT scan. S/p emergent ex lap on 9/4 w/ findings of gastric perforation, primary repair. Now w/  abdominal closure on 9/6, skin stapled closed, KERRY drains removed. Class A to OR on 9/18 for free air and extrav or oral contrast seen on CT     Continue SICU care, appreciate their assistance  G tube and NG to gravity/suction  Wean levophed, consider fluids or weaning sedation.  Flotrac may be beneficial  Will hopefully be able to use one sedative today

## 2020-09-20 NOTE — TREATMENT PLAN
Hepatology Treatment Plan    Elda Hernandez is a 50 y.o. female admitted to hospital 9/3/2020 (Hospital Day: 18) due to Perforated abdominal viscus. Hepatology following for immunosuppression management.    Lab Results   Component Value Date    TACROLIMUS 3.3 (L) 09/19/2020    TACROLIMUS 4.2 (L) 09/18/2020    TACROLIMUS 6.4 09/17/2020       Lab Results   Component Value Date    CREATININE 1.5 (H) 09/20/2020    CREATININE 1.7 (H) 09/19/2020    CREATININE 1.7 (H) 09/19/2020       Lab Results   Component Value Date    ALT 36 09/20/2020    AST 43 (H) 09/20/2020     (H) 04/07/2018    ALKPHOS 249 (H) 09/20/2020    BILITOT 2.3 (H) 09/20/2020    WBC 8.00 09/20/2020    HGB 7.2 (L) 09/20/2020     09/20/2020       Kidney function is stable  Liver enzymes are stable    Plan  - Continue immunosuppression regimen without changes    Immunosuppression Regimen:  Tacrolimus: 0.5mg daily sublingual    Required ex-lap on 9/18 due to concern for free air in setting of gastric perf. Repaired with g-tube through defect.     Please notify hepatology on day of discharge to discuss discharge medications and follow up.     Please obtain daily CBC, BMP, LFT, INR, immunosuppressant trough level    Thank you for involving us in the care of Elda Hernandez. Please call with any additional concerns or questions.    John Mustafa MD  Gastroenterology Fellow

## 2020-09-20 NOTE — PROGRESS NOTES
[facility]  General Surgery  Progress Note    Subjective:     History of Present Illness:  Ms. Hernandez is a 49yo F w/PMH of von Gierke disease s/p liver transplant (2002, on tacro + MMF, follows Dr. Nielsen), hx chronic rejection (bx 2015 with acute and chronic rejection s/p steroids), biliary stricture and ductopenic rejection, recently with cirrhosis on fibroscan (10/2019), HTN, and depression who presents as a transfer for further evaluation of gastric outlet obstruction and esophageal stricturing.  Patient decompensated requiring multiple pressors and intubation. CT scan showed free air. Prior to intubation patient reported severe abdominal pain.   states that patient has had epigastric pain, nausea, and vomiting for several days.     Post-Op Info:  Procedure(s) (LRB):  LAPAROTOMY, EXPLORATORY, DRAINAGE OF ABSCESS, REPAIR OF GASTRIC PERFORATION, GASTROSTOMY TUBE PLACEMENT (N/A)  APPLICATION, WOUND VAC (N/A)   2 Days Post-Op     Interval History: NAEON.  Low doses of levophed on propofol and precedex     Medications:  Continuous Infusions:   dexmedetomidine (PRECEDEX) infusion 0.8 mcg/kg/hr (09/20/20 0400)    norepinephrine bitartrate-D5W 0.05 mcg/kg/min (09/20/20 0500)    propofoL 20 mcg/kg/min (09/20/20 0500)    TPN ADULT CENTRAL LINE CUSTOM 40 mL/hr at 09/19/20 2200     Scheduled Meds:   fat emulsion 20%  250 mL Intravenous Daily    fluconazole (DIFLUCAN) IVPB  200 mg Intravenous Q24H    heparin (porcine)  5,000 Units Subcutaneous Q8H    pantoprazole  40 mg Intravenous Daily    piperacillin-tazobactam (ZOSYN) IVPB  4.5 g Intravenous Q8H    tacrolimus  0.5 mg Sublingual Daily AM     PRN Meds:sodium chloride, albuterol-ipratropium, albuterol-ipratropium, calcium gluconate IVPB, calcium gluconate IVPB, calcium gluconate IVPB, dextrose 50%, dextrose 50%, glucagon (human recombinant), glucose, glucose, haloperidol lactate, hydrALAZINE, insulin aspart U-100, labetaloL, magnesium  sulfate IVPB, magnesium sulfate IVPB, ondansetron, potassium chloride in water **AND** potassium chloride in water **AND** potassium chloride in water, promethazine, propofoL, sodium chloride 0.9%, sodium chloride 0.9%, sodium phosphate IVPB, sodium phosphate IVPB, sodium phosphate IVPB     Review of patient's allergies indicates:   Allergen Reactions    Codeine Itching     Other reaction(s): Itching    Lipitor [atorvastatin] Other (See Comments)     Other reaction(s): Muscle pain  Muscle cranmps    Morphine Itching     Other reaction(s): nausea and vomiting     Zoloft [sertraline] Other (See Comments)     Tremors/muscle spasms     Objective:     Vital Signs (Most Recent):  Temp: 99.2 °F (37.3 °C) (09/20/20 0300)  Pulse: 67 (09/20/20 0500)  Resp: (!) 25 (09/20/20 0500)  BP: (!) 141/63 (09/19/20 2300)  SpO2: (!) 93 % (09/20/20 0500) Vital Signs (24h Range):  Temp:  [98.6 °F (37 °C)-99.2 °F (37.3 °C)] 99.2 °F (37.3 °C)  Pulse:  [62-77] 67  Resp:  [18-47] 25  SpO2:  [92 %-97 %] 93 %  BP: ()/(53-63) 141/63  Arterial Line BP: ()/(44-74) 101/57     Weight: 67.6 kg (149 lb)  Body mass index is 30.09 kg/m².    Intake/Output - Last 3 Shifts       09/18 0700 - 09/19 0659 09/19 0700 - 09/20 0659 09/20 0700 - 09/21 0659    I.V. (mL/kg) 3571.4 (52.8) 1225.7 (18.1)     NG/ 110     IV Piggyback  200     TPN 1135 873     Total Intake(mL/kg) 5606.4 (82.9) 2408.7 (35.6)     Urine (mL/kg/hr) 942 (0.6) 1245 (0.8)     Drains 1805 246     Total Output 2747 1491     Net +2859.4 +917.7                  Physical Exam  Constitutional:       General: She is not in acute distress.  Cardiovascular:      Rate and Rhythm: Regular rhythm. Tachycardia present.   Pulmonary:      Effort: Pulmonary effort is normal. No respiratory distress.   Abdominal:      General: There is no distension.      Palpations: Abdomen is soft.      Tenderness: There is no abdominal tenderness.      Comments: Incision c/d/i  Thick drainage around G  tube   Neurological:      General: No focal deficit present.      Mental Status: She is alert and oriented to person, place, and time.   Psychiatric:         Mood and Affect: Mood normal.         Behavior: Behavior normal.         Significant Labs:  CBC:   Recent Labs   Lab 09/20/20  0300   WBC 8.00   RBC 2.53*   HGB 7.2*   HCT 23.1*      MCV 91   MCH 28.5   MCHC 31.2*     CMP:   Recent Labs   Lab 09/20/20  0300   *   CALCIUM 8.0*   ALBUMIN 1.4*   PROT 5.2*   *   K 3.7   CO2 17*   *   BUN 67*   CREATININE 1.5*   ALKPHOS 249*   ALT 36   AST 43*   BILITOT 2.3*       Significant Diagnostics:  I have reviewed all pertinent imaging results/findings within the past 24 hours.    Assessment/Plan:     * Perforated abdominal viscus  50 y.o. female w/ free air on CT scan. S/p emergent ex lap on 9/4 w/ findings of gastric perforation, primary repair. Now w/  abdominal closure on 9/6, skin stapled closed, KERRY drains removed. Class A to OR on 9/18 for free air and extrav or oral contrast seen on CT     Continue SICU care, appreciate their assistance  G tube and NG to gravity/suction  Wean levophed, consider fluids or weaning sedation.  Flotrac may be beneficial  Will hopefully be able to use one sedative today        Jhony Chappell MD  General Surgery  [facility]

## 2020-09-21 LAB
ALBUMIN SERPL BCP-MCNC: 1.2 G/DL (ref 3.5–5.2)
ALP SERPL-CCNC: 224 U/L (ref 55–135)
ALT SERPL W/O P-5'-P-CCNC: 34 U/L (ref 10–44)
ANION GAP SERPL CALC-SCNC: 11 MMOL/L (ref 8–16)
ANISOCYTOSIS BLD QL SMEAR: SLIGHT
APTT BLDCRRT: 31.9 SEC (ref 21–32)
AST SERPL-CCNC: 39 U/L (ref 10–40)
BACTERIA SPEC AEROBE CULT: ABNORMAL
BASOPHILS # BLD AUTO: 0.05 K/UL (ref 0–0.2)
BASOPHILS NFR BLD: 0.5 % (ref 0–1.9)
BILIRUB SERPL-MCNC: 2.8 MG/DL (ref 0.1–1)
BUN SERPL-MCNC: 54 MG/DL (ref 6–20)
BURR CELLS BLD QL SMEAR: ABNORMAL
CALCIUM SERPL-MCNC: 8.1 MG/DL (ref 8.7–10.5)
CHLORIDE SERPL-SCNC: 113 MMOL/L (ref 95–110)
CO2 SERPL-SCNC: 19 MMOL/L (ref 23–29)
CREAT SERPL-MCNC: 1.2 MG/DL (ref 0.5–1.4)
DIFFERENTIAL METHOD: ABNORMAL
EOSINOPHIL # BLD AUTO: 0.4 K/UL (ref 0–0.5)
EOSINOPHIL NFR BLD: 3.7 % (ref 0–8)
ERYTHROCYTE [DISTWIDTH] IN BLOOD BY AUTOMATED COUNT: 17.1 % (ref 11.5–14.5)
EST. GFR  (AFRICAN AMERICAN): >60 ML/MIN/1.73 M^2
EST. GFR  (NON AFRICAN AMERICAN): 52.1 ML/MIN/1.73 M^2
GLUCOSE SERPL-MCNC: 124 MG/DL (ref 70–110)
GLUCOSE SERPL-MCNC: 141 MG/DL (ref 70–110)
HCO3 UR-SCNC: 20 MMOL/L (ref 24–28)
HCT VFR BLD AUTO: 24.4 % (ref 37–48.5)
HCT VFR BLD CALC: 23 %PCV (ref 36–54)
HGB BLD-MCNC: 7.5 G/DL (ref 12–16)
HYPOCHROMIA BLD QL SMEAR: ABNORMAL
IMM GRANULOCYTES # BLD AUTO: 0.43 K/UL (ref 0–0.04)
IMM GRANULOCYTES NFR BLD AUTO: 4.4 % (ref 0–0.5)
INR PPP: 1.1 (ref 0.8–1.2)
LYMPHOCYTES # BLD AUTO: 1 K/UL (ref 1–4.8)
LYMPHOCYTES NFR BLD: 9.7 % (ref 18–48)
MAGNESIUM SERPL-MCNC: 2 MG/DL (ref 1.6–2.6)
MCH RBC QN AUTO: 28.7 PG (ref 27–31)
MCHC RBC AUTO-ENTMCNC: 30.7 G/DL (ref 32–36)
MCV RBC AUTO: 94 FL (ref 82–98)
MONOCYTES # BLD AUTO: 0.8 K/UL (ref 0.3–1)
MONOCYTES NFR BLD: 8.1 % (ref 4–15)
NEUTROPHILS # BLD AUTO: 7.2 K/UL (ref 1.8–7.7)
NEUTROPHILS NFR BLD: 73.6 % (ref 38–73)
NRBC BLD-RTO: 1 /100 WBC
PCO2 BLDA: 35.6 MMHG (ref 35–45)
PH SMN: 7.36 [PH] (ref 7.35–7.45)
PHOSPHATE SERPL-MCNC: 3.2 MG/DL (ref 2.7–4.5)
PLATELET # BLD AUTO: 151 K/UL (ref 150–350)
PLATELET BLD QL SMEAR: ABNORMAL
PMV BLD AUTO: 11.6 FL (ref 9.2–12.9)
PO2 BLDA: 197 MMHG (ref 80–100)
POC BE: -5 MMOL/L
POC IONIZED CALCIUM: 1.27 MMOL/L (ref 1.06–1.42)
POC SATURATED O2: 100 % (ref 95–100)
POC TCO2: 21 MMOL/L (ref 23–27)
POCT GLUCOSE: 115 MG/DL (ref 70–110)
POCT GLUCOSE: 118 MG/DL (ref 70–110)
POCT GLUCOSE: 123 MG/DL (ref 70–110)
POCT GLUCOSE: 130 MG/DL (ref 70–110)
POCT GLUCOSE: 132 MG/DL (ref 70–110)
POCT GLUCOSE: 138 MG/DL (ref 70–110)
POIKILOCYTOSIS BLD QL SMEAR: SLIGHT
POLYCHROMASIA BLD QL SMEAR: ABNORMAL
POTASSIUM BLD-SCNC: 4.7 MMOL/L (ref 3.5–5.1)
POTASSIUM SERPL-SCNC: 3.5 MMOL/L (ref 3.5–5.1)
PROT SERPL-MCNC: 5.4 G/DL (ref 6–8.4)
PROTHROMBIN TIME: 12.2 SEC (ref 9–12.5)
RBC # BLD AUTO: 2.61 M/UL (ref 4–5.4)
SAMPLE: ABNORMAL
SODIUM BLD-SCNC: 145 MMOL/L (ref 136–145)
SODIUM SERPL-SCNC: 143 MMOL/L (ref 136–145)
TACROLIMUS BLD-MCNC: 2.1 NG/ML (ref 5–15)
WBC # BLD AUTO: 9.79 K/UL (ref 3.9–12.7)

## 2020-09-21 PROCEDURE — 25000003 PHARM REV CODE 250: Performed by: INTERNAL MEDICINE

## 2020-09-21 PROCEDURE — 85610 PROTHROMBIN TIME: CPT

## 2020-09-21 PROCEDURE — 63600175 PHARM REV CODE 636 W HCPCS: Performed by: STUDENT IN AN ORGANIZED HEALTH CARE EDUCATION/TRAINING PROGRAM

## 2020-09-21 PROCEDURE — 99233 PR SUBSEQUENT HOSPITAL CARE,LEVL III: ICD-10-PCS | Mod: 24,,, | Performed by: SURGERY

## 2020-09-21 PROCEDURE — A4217 STERILE WATER/SALINE, 500 ML: HCPCS | Performed by: SURGERY

## 2020-09-21 PROCEDURE — B4185 PARENTERAL SOL 10 GM LIPIDS: HCPCS | Performed by: SURGERY

## 2020-09-21 PROCEDURE — 27000221 HC OXYGEN, UP TO 24 HOURS

## 2020-09-21 PROCEDURE — 80053 COMPREHEN METABOLIC PANEL: CPT

## 2020-09-21 PROCEDURE — 94761 N-INVAS EAR/PLS OXIMETRY MLT: CPT

## 2020-09-21 PROCEDURE — 85730 THROMBOPLASTIN TIME PARTIAL: CPT

## 2020-09-21 PROCEDURE — 25000003 PHARM REV CODE 250: Performed by: SURGERY

## 2020-09-21 PROCEDURE — 99900026 HC AIRWAY MAINTENANCE (STAT)

## 2020-09-21 PROCEDURE — 85025 COMPLETE CBC W/AUTO DIFF WBC: CPT

## 2020-09-21 PROCEDURE — 25000003 PHARM REV CODE 250: Performed by: STUDENT IN AN ORGANIZED HEALTH CARE EDUCATION/TRAINING PROGRAM

## 2020-09-21 PROCEDURE — 94003 VENT MGMT INPAT SUBQ DAY: CPT

## 2020-09-21 PROCEDURE — 20000000 HC ICU ROOM

## 2020-09-21 PROCEDURE — 84100 ASSAY OF PHOSPHORUS: CPT

## 2020-09-21 PROCEDURE — C9113 INJ PANTOPRAZOLE SODIUM, VIA: HCPCS | Performed by: STUDENT IN AN ORGANIZED HEALTH CARE EDUCATION/TRAINING PROGRAM

## 2020-09-21 PROCEDURE — 99900035 HC TECH TIME PER 15 MIN (STAT)

## 2020-09-21 PROCEDURE — 63600175 PHARM REV CODE 636 W HCPCS: Performed by: SURGERY

## 2020-09-21 PROCEDURE — 83735 ASSAY OF MAGNESIUM: CPT

## 2020-09-21 PROCEDURE — 80197 ASSAY OF TACROLIMUS: CPT

## 2020-09-21 PROCEDURE — 99233 SBSQ HOSP IP/OBS HIGH 50: CPT | Mod: 24,,, | Performed by: SURGERY

## 2020-09-21 RX ORDER — PROPOFOL 10 MG/ML
5 INJECTION, EMULSION INTRAVENOUS CONTINUOUS
Status: DISCONTINUED | OUTPATIENT
Start: 2020-09-21 | End: 2020-09-22

## 2020-09-21 RX ORDER — TACROLIMUS 0.5 MG/1
0.5 CAPSULE ORAL 2 TIMES DAILY
Status: DISCONTINUED | OUTPATIENT
Start: 2020-09-21 | End: 2020-09-29

## 2020-09-21 RX ADMIN — PROPOFOL 20 MCG/KG/MIN: 10 INJECTION, EMULSION INTRAVENOUS at 05:09

## 2020-09-21 RX ADMIN — PROPOFOL 20 MCG/KG/MIN: 10 INJECTION, EMULSION INTRAVENOUS at 07:09

## 2020-09-21 RX ADMIN — TACROLIMUS 0.5 MG: 1 CAPSULE ORAL at 08:09

## 2020-09-21 RX ADMIN — PANTOPRAZOLE SODIUM 40 MG: 40 INJECTION, POWDER, LYOPHILIZED, FOR SOLUTION INTRAVENOUS at 08:09

## 2020-09-21 RX ADMIN — HEPARIN SODIUM 5000 UNITS: 5000 INJECTION INTRAVENOUS; SUBCUTANEOUS at 10:09

## 2020-09-21 RX ADMIN — POTASSIUM CHLORIDE 40 MEQ: 29.8 INJECTION, SOLUTION INTRAVENOUS at 05:09

## 2020-09-21 RX ADMIN — MAGNESIUM SULFATE HEPTAHYDRATE: 500 INJECTION, SOLUTION INTRAMUSCULAR; INTRAVENOUS at 10:09

## 2020-09-21 RX ADMIN — I.V. FAT EMULSION 250 ML: 20 EMULSION INTRAVENOUS at 10:09

## 2020-09-21 RX ADMIN — TACROLIMUS 0.5 MG: 0.5 CAPSULE ORAL at 05:09

## 2020-09-21 RX ADMIN — PIPERACILLIN SODIUM AND TAZOBACTAM SODIUM 4.5 G: 4; .5 INJECTION, POWDER, LYOPHILIZED, FOR SOLUTION INTRAVENOUS at 02:09

## 2020-09-21 RX ADMIN — Medication 0.06 MCG/KG/MIN: at 05:09

## 2020-09-21 RX ADMIN — PIPERACILLIN SODIUM AND TAZOBACTAM SODIUM 4.5 G: 4; .5 INJECTION, POWDER, LYOPHILIZED, FOR SOLUTION INTRAVENOUS at 05:09

## 2020-09-21 RX ADMIN — HEPARIN SODIUM 5000 UNITS: 5000 INJECTION INTRAVENOUS; SUBCUTANEOUS at 05:09

## 2020-09-21 RX ADMIN — HEPARIN SODIUM 5000 UNITS: 5000 INJECTION INTRAVENOUS; SUBCUTANEOUS at 01:09

## 2020-09-21 RX ADMIN — PIPERACILLIN SODIUM AND TAZOBACTAM SODIUM 4.5 G: 4; .5 INJECTION, POWDER, LYOPHILIZED, FOR SOLUTION INTRAVENOUS at 10:09

## 2020-09-21 RX ADMIN — FLUCONAZOLE, SODIUM CHLORIDE 200 MG: 2 INJECTION INTRAVENOUS at 03:09

## 2020-09-21 NOTE — ASSESSMENT & PLAN NOTE
Neuro:  -sedation: precedex, propofol  -analgesia: PRN oxy. Dilaudid  - haldol q6hr PRN for agitation  - plan to wean sedation today     CV:   - levo gtt, goal MAP > 65mmhg  - repeat TTE, last with EF 30% and WMA on admission     Pulm:  - Intubated, mechanically ventilated  - plan to wean vent settings for extubation  - Duonebs Q6 PRN  Vent Mode: A/C  Oxygen Concentration (%):  [40] 40  Resp Rate Total:  [18 br/min-24 br/min] 24 br/min  Vt Set:  [350 mL] 350 mL  PEEP/CPAP:  [8 cmH20] 8 cmH20  Mean Airway Pressure:  [11 zmW32-41 cmH20] 13 cmH20     FEN/GI: Perforated stomach; s/p washout and patch (9/4); abdominal closure (9/6)  - s/p ex-lap on 9/19   - continue to hold tube feeds at this time  - NG tube, G tube, and L abdominal drains in place with serous output  - G tube upsized  - Lactulose discontinued (9/14) in setting of normal ammonia level (9/10) and improving mental status  - Daily metabolic panel with PRN repletion of electrolytes  - pantoprazole for ulcer ppx  - Cont TPN     Renal:  - Continue to monitor with daily metabolic labs  - nephrology on board, appreciate recs  - Cr 1.2 today; improving     HEME/ID:   - H/H stable    - Daily CBC  - s/p Liver transplant in 2002; Daily tacrolimus level with AM labs. Per hepatology, continue tacrolimus 1mg BID  - afebrile since yest AM; acetaminophen PRN  - Diflucan and Zosyn  - heparin for DVT ppx  - Abdominal culture grew Klebsiella pneumonia; sensitive to zosyn    Endo:  - Synthroid 40mcg daily  - insulin aspart for blood glucose control    Dispo:  - Continue SICU care

## 2020-09-21 NOTE — PT/OT/SLP DISCHARGE
Physical Therapy Discharge Summary    Name: Elda Hernandez  MRN: [id number]   Principal Problem: Perforated abdominal viscus     Patient Discharged from acute Physical Therapy on 2020.     Assessment:     Patient transferred to higher level of care. Pt went to OR for below procedure.    LAPAROTOMY, EXPLORATORY, DRAINAGE OF ABSCESS, REPAIR OF GASTRIC PERFORATION, GASTROSTOMY TUBE PLACEMENT (N/A)  APPLICATION, WOUND VAC (N/A)    Objective:     GOALS:   Multidisciplinary Problems     Physical Therapy Goals        Problem: Physical Therapy Goal    Goal Priority Disciplines Outcome Goal Variances Interventions   Physical Therapy Goal     PT, PT/OT Ongoing, Progressing     Description: Goals to be met by: 20     Patient will increase functional independence with mobility by performin. Supine to sit with MInimal Assistance - met ()  2. Sit to supine with MInimal Assistance - met ()  3. Sit to stand transfer with Minimal Assistance  4. Bed to chair transfer with Minimal Assistance using Rolling Walker or no AD.   5. Gait  x 25 feet with Minimal Assistance using Rolling Walker or no AD.    6. Lower extremity exercise program x30 reps per handout, with independence                       Plan:     Patient Discharged to: SICU.    Kamila Rowley, PT, DPT  2020  [phone]

## 2020-09-21 NOTE — PT/OT/SLP DISCHARGE
Occupational Therapy Discharge Summary    Elda Hernandez  MRN: [id number]   Principal Problem: Perforated abdominal viscus      Patient Discharged from acute Occupational Therapy on 9/21/20.      Assessment:      pt to OR 9/21 for exp-lap, thus new order needed to resume therapy    Objective:     GOALS:   Multidisciplinary Problems     Occupational Therapy Goals        Problem: Occupational Therapy Goal    Goal Priority Disciplines Outcome Interventions   Occupational Therapy Goal     OT, PT/OT Ongoing, Not Progressing    Description: Goals to be met by: 9/25/20     Patient will increase functional independence with ADLs by performing:    Feeding with Set-up Assistance.  UE Dressing with Stand-by Assistance.  LE Dressing with Stand-by Assistance.  Grooming while standing at sink with Stand-by Assistance.  Toileting from toilet with Stand-by Assistance for hygiene and clothing management.   Toilet transfer to toilet with Stand-by Assistance.                     Reasons for Discontinuation of Therapy Services  New orders needed post-op      Plan:     Patient Discharged to: ICU post-op    PAULINE Geronimo  9/21/2020

## 2020-09-21 NOTE — PROGRESS NOTES
Pt afebrile, sats >92% on Spontaneous 40%/ 5 PEEP, tolerated well. MAP > 65. SBP < 180. TPN/precedex/LR/levo gtts currently infusing. Accuchecks q4h, no insulin coverage required. POC reviewed with pt and , no questions at this time. Plan for extubation tomorrow. Will continue to monitor.

## 2020-09-21 NOTE — PROGRESS NOTES
[facility]  Critical Care - Surgery  Progress Note    Patient Name: Elda Hernandez  MRN: [id number]  Admission Date: 9/3/2020  Hospital Length of Stay: 18 days  Code Status: Full Code  Attending Provider: Clark Rodriguez MD  Primary Care Provider: Jordana Woods MD   Principal Problem: Perforated abdominal viscus    Subjective:     Hospital/ICU Course:  9/4: Pt admitted to SICU following ex lap for GI perf. Intubated, sedated. Wound vac in place.  Soon after arrival to unit a mottled appearance to RUE was noted.  Vascular consulted and US revealed R radial artery thrombosis.    9/5: Tachycardic..  R arm appearance greatly improved overnight.    9/6: Patient returned to OR for abdominal washout, closure. R arm with dopplerable signals to ulnar artery and palmar arch.   9/7: Attempted to wean patient off of sedation in an effort to move towards extubation. Patient weaned off of propofol. Precedex started. Patient's heart rate very labile and sensitize to sedation.   9/8: patient extubated  9/9: SHANICE. Labetalol PRN for HTN   9/10:  Diuresis increased.  Cr downtrending  9/11: Increased abdominal distension and elevated WBC count. CT abd  9/12: WBC to 45 today. Diflucan and zosyn initiated. Lactulose enema w/ improving mental status  9/13: WBC improved. Mental status improving. FWF initiated for hypernatremia.   9/14: White count continues to improve.  Oriented to person only this morning.    9/17: tolerating tube feeds, half TPN rate today. Possible step down   9/18: possible fall out of bed overnight. High G tube residual, now to gravity. Requiring comfortflo  9/19: s/p exlap. Intubated ans sedated. Weaning to extubate  9/20: No acute events overnight. Pt remains intubated, sedated. Plan to wean sedation for extubation.  9/21: Abdominal cultures grew Klebsiella; pt on zosyn    Interval History/Significant Events: NAEO.  Pt remains sedated, intubated, and mechanically ventillated.      Follow-up  For: Procedure(s) (LRB):  LAPAROTOMY, EXPLORATORY, DRAINAGE OF ABSCESS, REPAIR OF GASTRIC PERFORATION, GASTROSTOMY TUBE PLACEMENT (N/A)  APPLICATION, WOUND VAC (N/A)    Post-Operative Day: 3 Days Post-Op    Objective:     Vital Signs (Most Recent):  Temp: 99.3 °F (37.4 °C) (09/21/20 0300)  Pulse: (!) 59 (09/21/20 0615)  Resp: 18 (09/21/20 0615)  BP: (!) 106/54 (09/21/20 0600)  SpO2: (!) 92 % (09/21/20 0615) Vital Signs (24h Range):  Temp:  [98.8 °F (37.1 °C)-101 °F (38.3 °C)] 99.3 °F (37.4 °C)  Pulse:  [56-81] 59  Resp:  [14-41] 18  SpO2:  [88 %-100 %] 92 %  BP: (101-137)/(51-63) 106/54  Arterial Line BP: ()/(46-84) 96/53     Weight: 67.6 kg (149 lb)  Body mass index is 30.09 kg/m².      Intake/Output Summary (Last 24 hours) at 9/21/2020 0727  Last data filed at 9/21/2020 0600  Gross per 24 hour   Intake 4482 ml   Output 2314 ml   Net 2168 ml       Physical Exam  Vitals signs and nursing note reviewed.   Constitutional:       Comments: Pt sedated, intubated   HENT:      Head: Normocephalic and atraumatic.      Nose:      Comments: NG tube in place     Mouth/Throat:      Mouth: Mucous membranes are moist.   Neck:      Comments: RIJ central line  Cardiovascular:      Rate and Rhythm: Normal rate and regular rhythm.   Pulmonary:      Comments: Intubated, mechanically ventilated  Abdominal:      Comments: Midline incision with wound vac in place; excellent seal to wound vac.   L abd drains in place with serous output; G tube with serous output;      Genitourinary:     Comments: L groin with improving ecchymosis  Bhardwaj in place  Skin:     General: Skin is warm and dry.   Neurological:      Comments: Patient remains sedated         Vents:  Vent Mode: A/C (09/21/20 0312)  Ventilator Initiated: Yes (09/18/20 1648)  Set Rate: 16 BPM (09/21/20 0312)  Vt Set: 350 mL (09/21/20 0312)  Pressure Support: 5 cmH20 (09/08/20 1607)  PEEP/CPAP: 8 cmH20 (09/21/20 0312)  Oxygen Concentration (%): 40 (09/21/20 0600)  Peak Airway  Pressure: 23 cmH2O (09/21/20 0312)  Plateau Pressure: 22 cmH20 (09/20/20 1623)  Total Ve: 7.38 mL (09/21/20 0312)  Negative Inspiratory Force (cm H2O): -35 (09/08/20 1607)  F/VT Ratio<105 (RSBI): (!) 37.78 (09/21/20 0312)    Lines/Drains/Airways     Central Venous Catheter Line            Percutaneous Central Line Insertion/Assessment - Triple Lumen  09/04/20 0800 right internal jugular 16 days          Drain                 Urethral Catheter 09/14/20 1330 6 days         Closed/Suction Drain 09/18/20 1555 Left Abdomen Bulb 19 Fr. 2 days         Closed/Suction Drain 09/18/20 1559 Left Abdomen Bulb 19 Fr. 2 days         Gastrostomy/Enterostomy 09/18/20 1612 Other (see comments) LUQ decompression;feeding 2 days         NG/OG Tube 09/18/20 1500 Right nostril 2 days          Airway                 Airway - Non-Surgical 09/18/20 1519 Endotracheal Tube 2 days          Arterial Line            Arterial Line 09/19/20 1849 Left Radial 1 day          Peripheral Intravenous Line                 Peripheral IV - Single Lumen 09/20/20 1017 22 G Left;Posterior Hand less than 1 day                Significant Labs:    CBC/Anemia Profile:  Recent Labs   Lab 09/20/20  0300 09/21/20  0300   WBC 8.00 9.79   HGB 7.2* 7.5*   HCT 23.1* 24.4*    151   MCV 91 94   RDW 17.3* 17.1*        Chemistries:  Recent Labs   Lab 09/19/20  1147 09/20/20  0300 09/21/20  0300   NA  --  147* 143   K  --  3.7 3.5   CL  --  116* 113*   CO2  --  17* 19*   BUN  --  67* 54*   CREATININE  --  1.5* 1.2   CALCIUM  --  8.0* 8.1*   ALBUMIN  --  1.4* 1.2*   PROT  --  5.2* 5.4*   BILITOT  --  2.3* 2.8*   ALKPHOS  --  249* 224*   ALT  --  36 34   AST  --  43* 39   MG  --  2.2 2.0   PHOS 4.7* 4.6* 3.2       All pertinent labs within the past 24 hours have been reviewed.    Significant Imaging:  I have reviewed all pertinent imaging results/findings within the past 24 hours.    Assessment/Plan:     * Perforated abdominal viscus  Neuro:  -sedation: precedex,  propofol  -analgesia: PRN oxy. Dilaudid  - haldol q6hr PRN for agitation  - plan to wean sedation today     CV:   - levo gtt, goal MAP > 65mmhg  - repeat TTE, last with EF 30% and WMA on admission     Pulm:  - Intubated, mechanically ventilated  - plan to wean vent settings for extubation  - Duonebs Q6 PRN  Vent Mode: A/C  Oxygen Concentration (%):  [40] 40  Resp Rate Total:  [18 br/min-24 br/min] 24 br/min  Vt Set:  [350 mL] 350 mL  PEEP/CPAP:  [8 cmH20] 8 cmH20  Mean Airway Pressure:  [11 btK96-46 cmH20] 13 cmH20     FEN/GI: Perforated stomach; s/p washout and patch (9/4); abdominal closure (9/6)  - s/p ex-lap on 9/19   - continue to hold tube feeds at this time  - NG tube, G tube, and L abdominal drains in place with serous output  - G tube upsized  - Lactulose discontinued (9/14) in setting of normal ammonia level (9/10) and improving mental status  - Daily metabolic panel with PRN repletion of electrolytes  - pantoprazole for ulcer ppx  - Cont TPN     Renal:  - Continue to monitor with daily metabolic labs  - nephrology on board, appreciate recs  - Cr 1.2 today; improving     HEME/ID:   - H/H stable    - Daily CBC  - s/p Liver transplant in 2002; Daily tacrolimus level with AM labs. Per hepatology, continue tacrolimus 1mg BID  - afebrile since yest AM; acetaminophen PRN  - Diflucan and Zosyn  - heparin for DVT ppx  - Abdominal culture grew Klebsiella pneumonia; sensitive to zosyn    Endo:  - Synthroid 40mcg daily  - insulin aspart for blood glucose control    Dispo:  - Continue SICU care       John Spivey MD  Critical Care - Surgery  [facility]

## 2020-09-21 NOTE — CONSULTS
Wound care consulted for broken skin on buttocks  PMH:  von Gierke disease s/p liver transplant, hx chronic rejection, biliary stricture and ductopenic rejection, recently with cirrhosis on fibroscan (10/2019), HTN, and depression who presents as a transfer for further evaluation of gastric outlet obstruction and esophageal stricturing.Patient decompensated requiring multiple pressors and intubation.   Assessment:  The patient is on a vent per ENDO tube, low air loss mattress, pillows for support, heel protectors in place, blue pads for moisture control  The sacral/buttocks were observed. Blanchable pinkness, skin intact.  Recommendations:  Continue Triad BID to sacral/buttocks area as a preventive measures.  Continue pressure prevention measures  Nursing to continue care, wound care will sign off.  Please reconsult if needed.  RICHARD Stokes RN, CN  [phone]

## 2020-09-21 NOTE — PROGRESS NOTES
[facility]  General Surgery  Progress Note    Subjective:     History of Present Illness:  Ms. Hernandez is a 49yo F w/PMH of von Gierke disease s/p liver transplant (2002, on tacro + MMF, follows Dr. Nielsen), hx chronic rejection (bx 2015 with acute and chronic rejection s/p steroids), biliary stricture and ductopenic rejection, recently with cirrhosis on fibroscan (10/2019), HTN, and depression who presents as a transfer for further evaluation of gastric outlet obstruction and esophageal stricturing.  Patient decompensated requiring multiple pressors and intubation. CT scan showed free air. Prior to intubation patient reported severe abdominal pain.   states that patient has had epigastric pain, nausea, and vomiting for several days.     Post-Op Info:  Procedure(s) (LRB):  LAPAROTOMY, EXPLORATORY, DRAINAGE OF ABSCESS, REPAIR OF GASTRIC PERFORATION, GASTROSTOMY TUBE PLACEMENT (N/A)  APPLICATION, WOUND VAC (N/A)   3 Days Post-Op     Interval History: NAEON. Continues on low doses of levophed. On propofol and precedex     Medications:  Continuous Infusions:   dexmedetomidine (PRECEDEX) infusion 0.6 mcg/kg/hr (09/21/20 0700)    lactated ringers 50 mL/hr at 09/21/20 0700    norepinephrine bitartrate-D5W 0.06 mcg/kg/min (09/21/20 0700)    propofoL 20 mcg/kg/min (09/21/20 0749)    TPN ADULT CENTRAL LINE CUSTOM 40 mL/hr at 09/21/20 0600     Scheduled Meds:   fluconazole (DIFLUCAN) IVPB  200 mg Intravenous Q24H    heparin (porcine)  5,000 Units Subcutaneous Q8H    pantoprazole  40 mg Intravenous Daily    piperacillin-tazobactam (ZOSYN) IVPB  4.5 g Intravenous Q8H    tacrolimus  0.5 mg Sublingual Daily AM     PRN Meds:sodium chloride, acetaminophen, albuterol-ipratropium, calcium gluconate IVPB, calcium gluconate IVPB, calcium gluconate IVPB, dextrose 50%, dextrose 50%, glucagon (human recombinant), glucose, glucose, haloperidol lactate, hydrALAZINE, insulin aspart U-100, labetaloL,  magnesium sulfate IVPB, magnesium sulfate IVPB, ondansetron, potassium chloride in water **AND** potassium chloride in water **AND** potassium chloride in water, promethazine, sodium chloride 0.9%, sodium chloride 0.9%, sodium phosphate IVPB, sodium phosphate IVPB, sodium phosphate IVPB     Review of patient's allergies indicates:   Allergen Reactions    Codeine Itching     Other reaction(s): Itching    Lipitor [atorvastatin] Other (See Comments)     Other reaction(s): Muscle pain  Muscle cranmps    Morphine Itching     Other reaction(s): nausea and vomiting     Zoloft [sertraline] Other (See Comments)     Tremors/muscle spasms     Objective:     Vital Signs (Most Recent):  Temp: 99.3 °F (37.4 °C) (09/21/20 0300)  Pulse: 65 (09/21/20 0730)  Resp: 19 (09/21/20 0730)  BP: (!) 106/54 (09/21/20 0600)  SpO2: 100 % (09/21/20 0730) Vital Signs (24h Range):  Temp:  [98.8 °F (37.1 °C)-101 °F (38.3 °C)] 99.3 °F (37.4 °C)  Pulse:  [56-81] 65  Resp:  [14-41] 19  SpO2:  [88 %-100 %] 100 %  BP: (101-137)/(51-63) 106/54  Arterial Line BP: ()/(46-84) 96/53     Weight: 67.6 kg (149 lb)  Body mass index is 30.09 kg/m².    Intake/Output - Last 3 Shifts       09/19 0700 - 09/20 0659 09/20 0700 - 09/21 0659 09/21 0700 - 09/22 0659    I.V. (mL/kg) 1225.7 (18.1) 2344 (34.7)     NG/ 50     IV Piggyback 200 1000      1088     Total Intake(mL/kg) 2408.7 (35.6) 4482 (66.3)     Urine (mL/kg/hr) 1245 (0.8) 1630 (1) 75 (1.2)    Drains 246 1076     Total Output 1491 2706 75    Net +917.7 +1776 -75                 Physical Exam  Constitutional:       General: She is not in acute distress.  HENT:      Head: Normocephalic and atraumatic.   Cardiovascular:      Rate and Rhythm: Regular rhythm. Tachycardia present.   Pulmonary:      Effort: Pulmonary effort is normal. No respiratory distress.   Abdominal:      General: There is no distension.      Palpations: Abdomen is soft.      Tenderness: There is no abdominal tenderness.       Comments: Incision c/d/i   Neurological:      General: No focal deficit present.      Mental Status: She is alert and oriented to person, place, and time.   Psychiatric:         Mood and Affect: Mood normal.         Behavior: Behavior normal.         Significant Labs:  CBC:   Recent Labs   Lab 09/21/20  0300   WBC 9.79   RBC 2.61*   HGB 7.5*   HCT 24.4*      MCV 94   MCH 28.7   MCHC 30.7*     CMP:   Recent Labs   Lab 09/21/20  0300   *   CALCIUM 8.1*   ALBUMIN 1.2*   PROT 5.4*      K 3.5   CO2 19*   *   BUN 54*   CREATININE 1.2   ALKPHOS 224*   ALT 34   AST 39   BILITOT 2.8*       Significant Diagnostics:  I have reviewed all pertinent imaging results/findings within the past 24 hours.    Assessment/Plan:     * Perforated abdominal viscus  50 y.o. female w/ free air on CT scan. S/p emergent ex lap on 9/4 w/ findings of gastric perforation, primary repair. Now w/  abdominal closure on 9/6, skin stapled closed, KERRY drains removed. Class A to OR on 9/18 for free air and extrav or oral contrast seen on CT     Continue SICU care, appreciate their assistance  G tube and NG to gravity/suction  Wean levophed, consider fluids or weaning sedation.  Flotrac may be beneficial  Will hopefully be able to use one sedative today        Rocio Hidalgo MD  General Surgery  [facility]

## 2020-09-21 NOTE — SUBJECTIVE & OBJECTIVE
Interval History/Significant Events: NAEO.  Pt remains sedated, intubated, and mechanically ventillated.      Follow-up For: Procedure(s) (LRB):  LAPAROTOMY, EXPLORATORY, DRAINAGE OF ABSCESS, REPAIR OF GASTRIC PERFORATION, GASTROSTOMY TUBE PLACEMENT (N/A)  APPLICATION, WOUND VAC (N/A)    Post-Operative Day: 3 Days Post-Op    Objective:     Vital Signs (Most Recent):  Temp: 99.3 °F (37.4 °C) (09/21/20 0300)  Pulse: (!) 59 (09/21/20 0615)  Resp: 18 (09/21/20 0615)  BP: (!) 106/54 (09/21/20 0600)  SpO2: (!) 92 % (09/21/20 0615) Vital Signs (24h Range):  Temp:  [98.8 °F (37.1 °C)-101 °F (38.3 °C)] 99.3 °F (37.4 °C)  Pulse:  [56-81] 59  Resp:  [14-41] 18  SpO2:  [88 %-100 %] 92 %  BP: (101-137)/(51-63) 106/54  Arterial Line BP: ()/(46-84) 96/53     Weight: 67.6 kg (149 lb)  Body mass index is 30.09 kg/m².      Intake/Output Summary (Last 24 hours) at 9/21/2020 0727  Last data filed at 9/21/2020 0600  Gross per 24 hour   Intake 4482 ml   Output 2314 ml   Net 2168 ml       Physical Exam  Vitals signs and nursing note reviewed.   Constitutional:       Comments: Pt sedated, intubated   HENT:      Head: Normocephalic and atraumatic.      Nose:      Comments: NG tube in place     Mouth/Throat:      Mouth: Mucous membranes are moist.   Neck:      Comments: RIJ central line  Cardiovascular:      Rate and Rhythm: Normal rate and regular rhythm.   Pulmonary:      Comments: Intubated, mechanically ventilated  Abdominal:      Comments: Midline incision with wound vac in place; excellent seal to wound vac.   L abd drains in place with serous output; G tube with serous output;      Genitourinary:     Comments: L groin with improving ecchymosis  Bhardwaj in place  Skin:     General: Skin is warm and dry.   Neurological:      Comments: Patient remains sedated         Vents:  Vent Mode: A/C (09/21/20 0312)  Ventilator Initiated: Yes (09/18/20 1648)  Set Rate: 16 BPM (09/21/20 0312)  Vt Set: 350 mL (09/21/20 0312)  Pressure Support: 5  cmH20 (09/08/20 1607)  PEEP/CPAP: 8 cmH20 (09/21/20 0312)  Oxygen Concentration (%): 40 (09/21/20 0600)  Peak Airway Pressure: 23 cmH2O (09/21/20 0312)  Plateau Pressure: 22 cmH20 (09/20/20 1623)  Total Ve: 7.38 mL (09/21/20 0312)  Negative Inspiratory Force (cm H2O): -35 (09/08/20 1607)  F/VT Ratio<105 (RSBI): (!) 37.78 (09/21/20 0312)    Lines/Drains/Airways     Central Venous Catheter Line            Percutaneous Central Line Insertion/Assessment - Triple Lumen  09/04/20 0800 right internal jugular 16 days          Drain                 Urethral Catheter 09/14/20 1330 6 days         Closed/Suction Drain 09/18/20 1555 Left Abdomen Bulb 19 Fr. 2 days         Closed/Suction Drain 09/18/20 1559 Left Abdomen Bulb 19 Fr. 2 days         Gastrostomy/Enterostomy 09/18/20 1612 Other (see comments) LUQ decompression;feeding 2 days         NG/OG Tube 09/18/20 1500 Right nostril 2 days          Airway                 Airway - Non-Surgical 09/18/20 1519 Endotracheal Tube 2 days          Arterial Line            Arterial Line 09/19/20 1849 Left Radial 1 day          Peripheral Intravenous Line                 Peripheral IV - Single Lumen 09/20/20 1017 22 G Left;Posterior Hand less than 1 day                Significant Labs:    CBC/Anemia Profile:  Recent Labs   Lab 09/20/20  0300 09/21/20  0300   WBC 8.00 9.79   HGB 7.2* 7.5*   HCT 23.1* 24.4*    151   MCV 91 94   RDW 17.3* 17.1*        Chemistries:  Recent Labs   Lab 09/19/20  1147 09/20/20  0300 09/21/20  0300   NA  --  147* 143   K  --  3.7 3.5   CL  --  116* 113*   CO2  --  17* 19*   BUN  --  67* 54*   CREATININE  --  1.5* 1.2   CALCIUM  --  8.0* 8.1*   ALBUMIN  --  1.4* 1.2*   PROT  --  5.2* 5.4*   BILITOT  --  2.3* 2.8*   ALKPHOS  --  249* 224*   ALT  --  36 34   AST  --  43* 39   MG  --  2.2 2.0   PHOS 4.7* 4.6* 3.2       All pertinent labs within the past 24 hours have been reviewed.    Significant Imaging:  I have reviewed all pertinent imaging results/findings  within the past 24 hours.

## 2020-09-21 NOTE — PLAN OF CARE
VSS, Afebrile, MAP > 65, SBP < 180, SpO2 > 90% on ACVC 40/8. Pt follows commands and moves all extremities. Levo @ 0.06, TPN @ 40, Precedex @ 0.6, Propofol @ 20, LR @ 50, UOP trended, Wound vac output minimal. See flowsheet for KERRY drain output. Traid cream applied to sacrum, pt turned q2. Partial bed bath given. Heel boots intact, SCDs on. Bed plugged in, mattress inflated. Will continue to monitor.

## 2020-09-21 NOTE — ANESTHESIA POSTPROCEDURE EVALUATION
Anesthesia Post Evaluation    Patient: Elda Hernandez    Procedure(s) Performed: Procedure(s) (LRB):  LAPAROTOMY, EXPLORATORY, DRAINAGE OF ABSCESS, REPAIR OF GASTRIC PERFORATION, GASTROSTOMY TUBE PLACEMENT (N/A)  APPLICATION, WOUND VAC (N/A)    Final Anesthesia Type: general    Patient location during evaluation: ICU  Patient participation: No - Unable to Participate, Intubation  Level of consciousness: sedated  Post-procedure vital signs: reviewed and stable  Pain management: adequate  Airway patency: patent    PONV status at discharge: No PONV  Anesthetic complications: no      Cardiovascular status: stable  Respiratory status: ETT and ventilator  Hydration status: euvolemic  Follow-up not needed.  Comments: Interview with ICU bedside nurse          Vitals Value Taken Time   /54 09/21/20 0801   Temp 37.3 °C (99.2 °F) 09/21/20 0700   Pulse 63 09/21/20 0859   Resp 17 09/21/20 0859   SpO2 93 % 09/21/20 0859   Vitals shown include unvalidated device data.      No case tracking events are documented in the log.      Pain/Ector Score: Pain Rating Prior to Med Admin: 0 (9/20/2020  9:15 AM)

## 2020-09-21 NOTE — SUBJECTIVE & OBJECTIVE
Interval History: NAEON. Continues on low doses of levophed. On propofol and precedex     Medications:  Continuous Infusions:   dexmedetomidine (PRECEDEX) infusion 0.6 mcg/kg/hr (09/21/20 0700)    lactated ringers 50 mL/hr at 09/21/20 0700    norepinephrine bitartrate-D5W 0.06 mcg/kg/min (09/21/20 0700)    propofoL 20 mcg/kg/min (09/21/20 0749)    TPN ADULT CENTRAL LINE CUSTOM 40 mL/hr at 09/21/20 0600     Scheduled Meds:   fluconazole (DIFLUCAN) IVPB  200 mg Intravenous Q24H    heparin (porcine)  5,000 Units Subcutaneous Q8H    pantoprazole  40 mg Intravenous Daily    piperacillin-tazobactam (ZOSYN) IVPB  4.5 g Intravenous Q8H    tacrolimus  0.5 mg Sublingual Daily AM     PRN Meds:sodium chloride, acetaminophen, albuterol-ipratropium, calcium gluconate IVPB, calcium gluconate IVPB, calcium gluconate IVPB, dextrose 50%, dextrose 50%, glucagon (human recombinant), glucose, glucose, haloperidol lactate, hydrALAZINE, insulin aspart U-100, labetaloL, magnesium sulfate IVPB, magnesium sulfate IVPB, ondansetron, potassium chloride in water **AND** potassium chloride in water **AND** potassium chloride in water, promethazine, sodium chloride 0.9%, sodium chloride 0.9%, sodium phosphate IVPB, sodium phosphate IVPB, sodium phosphate IVPB     Review of patient's allergies indicates:   Allergen Reactions    Codeine Itching     Other reaction(s): Itching    Lipitor [atorvastatin] Other (See Comments)     Other reaction(s): Muscle pain  Muscle cranmps    Morphine Itching     Other reaction(s): nausea and vomiting     Zoloft [sertraline] Other (See Comments)     Tremors/muscle spasms     Objective:     Vital Signs (Most Recent):  Temp: 99.3 °F (37.4 °C) (09/21/20 0300)  Pulse: 65 (09/21/20 0730)  Resp: 19 (09/21/20 0730)  BP: (!) 106/54 (09/21/20 0600)  SpO2: 100 % (09/21/20 0730) Vital Signs (24h Range):  Temp:  [98.8 °F (37.1 °C)-101 °F (38.3 °C)] 99.3 °F (37.4 °C)  Pulse:  [56-81] 65  Resp:  [14-41] 19  SpO2:   [88 %-100 %] 100 %  BP: (101-137)/(51-63) 106/54  Arterial Line BP: ()/(46-84) 96/53     Weight: 67.6 kg (149 lb)  Body mass index is 30.09 kg/m².    Intake/Output - Last 3 Shifts       09/19 0700 - 09/20 0659 09/20 0700 - 09/21 0659 09/21 0700 - 09/22 0659    I.V. (mL/kg) 1225.7 (18.1) 2344 (34.7)     NG/ 50     IV Piggyback 200 1000      1088     Total Intake(mL/kg) 2408.7 (35.6) 4482 (66.3)     Urine (mL/kg/hr) 1245 (0.8) 1630 (1) 75 (1.2)    Drains 246 1076     Total Output 1491 2706 75    Net +917.7 +1776 -75                 Physical Exam  Constitutional:       General: She is not in acute distress.  HENT:      Head: Normocephalic and atraumatic.   Cardiovascular:      Rate and Rhythm: Regular rhythm. Tachycardia present.   Pulmonary:      Effort: Pulmonary effort is normal. No respiratory distress.   Abdominal:      General: There is no distension.      Palpations: Abdomen is soft.      Tenderness: There is no abdominal tenderness.      Comments: Incision c/d/i   Neurological:      General: No focal deficit present.      Mental Status: She is alert and oriented to person, place, and time.   Psychiatric:         Mood and Affect: Mood normal.         Behavior: Behavior normal.         Significant Labs:  CBC:   Recent Labs   Lab 09/21/20  0300   WBC 9.79   RBC 2.61*   HGB 7.5*   HCT 24.4*      MCV 94   MCH 28.7   MCHC 30.7*     CMP:   Recent Labs   Lab 09/21/20  0300   *   CALCIUM 8.1*   ALBUMIN 1.2*   PROT 5.4*      K 3.5   CO2 19*   *   BUN 54*   CREATININE 1.2   ALKPHOS 224*   ALT 34   AST 39   BILITOT 2.8*       Significant Diagnostics:  I have reviewed all pertinent imaging results/findings within the past 24 hours.

## 2020-09-21 NOTE — SUBJECTIVE & OBJECTIVE
Interval History: NAEON. Off levo and propofol.    Medications:  Continuous Infusions:   dexmedetomidine (PRECEDEX) infusion 0.6 mcg/kg/hr (09/21/20 0700)    lactated ringers 50 mL/hr at 09/21/20 0700    norepinephrine bitartrate-D5W 0.06 mcg/kg/min (09/21/20 0700)    propofoL 20 mcg/kg/min (09/21/20 0749)    TPN ADULT CENTRAL LINE CUSTOM 40 mL/hr at 09/21/20 0600     Scheduled Meds:   fluconazole (DIFLUCAN) IVPB  200 mg Intravenous Q24H    heparin (porcine)  5,000 Units Subcutaneous Q8H    pantoprazole  40 mg Intravenous Daily    piperacillin-tazobactam (ZOSYN) IVPB  4.5 g Intravenous Q8H    tacrolimus  0.5 mg Sublingual Daily AM     PRN Meds:sodium chloride, acetaminophen, albuterol-ipratropium, calcium gluconate IVPB, calcium gluconate IVPB, calcium gluconate IVPB, dextrose 50%, dextrose 50%, glucagon (human recombinant), glucose, glucose, haloperidol lactate, hydrALAZINE, insulin aspart U-100, labetaloL, magnesium sulfate IVPB, magnesium sulfate IVPB, ondansetron, potassium chloride in water **AND** potassium chloride in water **AND** potassium chloride in water, promethazine, sodium chloride 0.9%, sodium chloride 0.9%, sodium phosphate IVPB, sodium phosphate IVPB, sodium phosphate IVPB     Review of patient's allergies indicates:   Allergen Reactions    Codeine Itching     Other reaction(s): Itching    Lipitor [atorvastatin] Other (See Comments)     Other reaction(s): Muscle pain  Muscle cranmps    Morphine Itching     Other reaction(s): nausea and vomiting     Zoloft [sertraline] Other (See Comments)     Tremors/muscle spasms     Objective:     Vital Signs (Most Recent):  Temp: 99.3 °F (37.4 °C) (09/21/20 0300)  Pulse: 65 (09/21/20 0730)  Resp: 19 (09/21/20 0730)  BP: (!) 106/54 (09/21/20 0600)  SpO2: 100 % (09/21/20 0730) Vital Signs (24h Range):  Temp:  [98.8 °F (37.1 °C)-101 °F (38.3 °C)] 99.3 °F (37.4 °C)  Pulse:  [56-81] 65  Resp:  [14-41] 19  SpO2:  [88 %-100 %] 100 %  BP: (101-137)/(51-63)  106/54  Arterial Line BP: ()/(46-84) 96/53     Weight: 67.6 kg (149 lb)  Body mass index is 30.09 kg/m².    Intake/Output - Last 3 Shifts       09/19 0700 - 09/20 0659 09/20 0700 - 09/21 0659 09/21 0700 - 09/22 0659    I.V. (mL/kg) 1225.7 (18.1) 2344 (34.7)     NG/ 50     IV Piggyback 200 1000      1088     Total Intake(mL/kg) 2408.7 (35.6) 4482 (66.3)     Urine (mL/kg/hr) 1245 (0.8) 1630 (1) 75 (1)    Drains 246 1076     Total Output 1491 2706 75    Net +917.7 +1776 -75                 Physical Exam  Constitutional:       General: She is not in acute distress.  HENT:      Head: Normocephalic and atraumatic.   Cardiovascular:      Rate and Rhythm: Regular rhythm. Tachycardia present.   Pulmonary:      Effort: Pulmonary effort is normal. No respiratory distress.      Comments: Intubated and sedated  Abdominal:      General: There is no distension.      Palpations: Abdomen is soft.      Tenderness: There is no abdominal tenderness.      Comments: Incision c/d/i  G tube to gravity, minimal drainage on drain sponge  Abdominal binder in place   Neurological:      General: No focal deficit present.      Mental Status: She is alert and oriented to person, place, and time.   Psychiatric:         Mood and Affect: Mood normal.         Behavior: Behavior normal.         Significant Labs:  CBC:   Recent Labs   Lab 09/21/20  0300   WBC 9.79   RBC 2.61*   HGB 7.5*   HCT 24.4*      MCV 94   MCH 28.7   MCHC 30.7*     CMP:   Recent Labs   Lab 09/21/20  0300   *   CALCIUM 8.1*   ALBUMIN 1.2*   PROT 5.4*      K 3.5   CO2 19*   *   BUN 54*   CREATININE 1.2   ALKPHOS 224*   ALT 34   AST 39   BILITOT 2.8*       Significant Diagnostics:  I have reviewed all pertinent imaging results/findings within the past 24 hours.

## 2020-09-21 NOTE — ASSESSMENT & PLAN NOTE
50 y.o. female w/ free air on CT scan. S/p emergent ex lap on 9/4 w/ findings of gastric perforation, primary repair. Now w/  abdominal closure on 9/6, skin stapled closed, KERRY drains removed. Class A to OR on 9/18 for free air and extrav or oral contrast seen on CT     Continue SICU care, appreciate their assistance  G tube and NG to gravity/suction  Wean vent to extubate

## 2020-09-21 NOTE — PLAN OF CARE
09/21/20 1037   Discharge Reassessment   Assessment Type Discharge Planning Reassessment   Provided patient/caregiver education on the expected discharge date and the discharge plan Yes   Do you have any problems affording any of your prescribed medications? No   Discharge Plan A Skilled Nursing Facility   Discharge Plan B Home Health   DME Needed Upon Discharge  other (see comments)  (TBD)   Anticipated Discharge Disposition SNF       Susana Pringle MPH, RN, CM  Ext. [phone]

## 2020-09-22 LAB
ALBUMIN SERPL BCP-MCNC: 1.2 G/DL (ref 3.5–5.2)
ALLENS TEST: ABNORMAL
ALP SERPL-CCNC: 190 U/L (ref 55–135)
ALT SERPL W/O P-5'-P-CCNC: 28 U/L (ref 10–44)
ANION GAP SERPL CALC-SCNC: 10 MMOL/L (ref 8–16)
ANISOCYTOSIS BLD QL SMEAR: SLIGHT
AST SERPL-CCNC: 26 U/L (ref 10–40)
BASOPHILS # BLD AUTO: 0.02 K/UL (ref 0–0.2)
BASOPHILS NFR BLD: 0.3 % (ref 0–1.9)
BILIRUB SERPL-MCNC: 2.9 MG/DL (ref 0.1–1)
BUN SERPL-MCNC: 43 MG/DL (ref 6–20)
CALCIUM SERPL-MCNC: 8.1 MG/DL (ref 8.7–10.5)
CHLORIDE SERPL-SCNC: 115 MMOL/L (ref 95–110)
CO2 SERPL-SCNC: 19 MMOL/L (ref 23–29)
CREAT SERPL-MCNC: 1 MG/DL (ref 0.5–1.4)
DELSYS: ABNORMAL
DIFFERENTIAL METHOD: ABNORMAL
EOSINOPHIL # BLD AUTO: 0.3 K/UL (ref 0–0.5)
EOSINOPHIL NFR BLD: 3.5 % (ref 0–8)
ERYTHROCYTE [DISTWIDTH] IN BLOOD BY AUTOMATED COUNT: 16.7 % (ref 11.5–14.5)
ERYTHROCYTE [SEDIMENTATION RATE] IN BLOOD BY WESTERGREN METHOD: 16 MM/H
EST. GFR  (AFRICAN AMERICAN): >60 ML/MIN/1.73 M^2
EST. GFR  (NON AFRICAN AMERICAN): >60 ML/MIN/1.73 M^2
FIO2: 40
GLUCOSE SERPL-MCNC: 156 MG/DL (ref 70–110)
HCO3 UR-SCNC: 16.8 MMOL/L (ref 24–28)
HCT VFR BLD AUTO: 23.5 % (ref 37–48.5)
HGB BLD-MCNC: 7.2 G/DL (ref 12–16)
HYPOCHROMIA BLD QL SMEAR: ABNORMAL
IMM GRANULOCYTES # BLD AUTO: 0.29 K/UL (ref 0–0.04)
IMM GRANULOCYTES NFR BLD AUTO: 4 % (ref 0–0.5)
LYMPHOCYTES # BLD AUTO: 0.9 K/UL (ref 1–4.8)
LYMPHOCYTES NFR BLD: 12.6 % (ref 18–48)
MAGNESIUM SERPL-MCNC: 1.7 MG/DL (ref 1.6–2.6)
MCH RBC QN AUTO: 28.2 PG (ref 27–31)
MCHC RBC AUTO-ENTMCNC: 30.6 G/DL (ref 32–36)
MCV RBC AUTO: 92 FL (ref 82–98)
MODE: ABNORMAL
MONOCYTES # BLD AUTO: 0.9 K/UL (ref 0.3–1)
MONOCYTES NFR BLD: 11.8 % (ref 4–15)
NEUTROPHILS # BLD AUTO: 4.9 K/UL (ref 1.8–7.7)
NEUTROPHILS NFR BLD: 67.8 % (ref 38–73)
NRBC BLD-RTO: 0 /100 WBC
OVALOCYTES BLD QL SMEAR: ABNORMAL
PCO2 BLDA: 20.1 MMHG (ref 35–45)
PEEP: 5
PH SMN: 7.53 [PH] (ref 7.35–7.45)
PHOSPHATE SERPL-MCNC: 2.4 MG/DL (ref 2.7–4.5)
PLATELET # BLD AUTO: 132 K/UL (ref 150–350)
PMV BLD AUTO: 12.6 FL (ref 9.2–12.9)
PO2 BLDA: 77 MMHG (ref 80–100)
POC BE: -6 MMOL/L
POC SATURATED O2: 97 % (ref 95–100)
POC TCO2: 17 MMOL/L (ref 23–27)
POCT GLUCOSE: 121 MG/DL (ref 70–110)
POCT GLUCOSE: 122 MG/DL (ref 70–110)
POCT GLUCOSE: 135 MG/DL (ref 70–110)
POCT GLUCOSE: 169 MG/DL (ref 70–110)
POCT GLUCOSE: 175 MG/DL (ref 70–110)
POIKILOCYTOSIS BLD QL SMEAR: SLIGHT
POLYCHROMASIA BLD QL SMEAR: ABNORMAL
POTASSIUM SERPL-SCNC: 3.4 MMOL/L (ref 3.5–5.1)
PROT SERPL-MCNC: 5.5 G/DL (ref 6–8.4)
RBC # BLD AUTO: 2.55 M/UL (ref 4–5.4)
SAMPLE: ABNORMAL
SITE: ABNORMAL
SODIUM SERPL-SCNC: 144 MMOL/L (ref 136–145)
TACROLIMUS BLD-MCNC: <1.5 NG/ML (ref 5–15)
TRIGL SERPL-MCNC: 197 MG/DL (ref 30–150)
TRIGL SERPL-MCNC: 197 MG/DL (ref 30–150)
VT: 350
WBC # BLD AUTO: 7.21 K/UL (ref 3.9–12.7)

## 2020-09-22 PROCEDURE — 27000221 HC OXYGEN, UP TO 24 HOURS

## 2020-09-22 PROCEDURE — 63600175 PHARM REV CODE 636 W HCPCS: Performed by: STUDENT IN AN ORGANIZED HEALTH CARE EDUCATION/TRAINING PROGRAM

## 2020-09-22 PROCEDURE — 85025 COMPLETE CBC W/AUTO DIFF WBC: CPT

## 2020-09-22 PROCEDURE — 99233 PR SUBSEQUENT HOSPITAL CARE,LEVL III: ICD-10-PCS | Mod: 24,,, | Performed by: SURGERY

## 2020-09-22 PROCEDURE — 94761 N-INVAS EAR/PLS OXIMETRY MLT: CPT

## 2020-09-22 PROCEDURE — 25000003 PHARM REV CODE 250: Performed by: STUDENT IN AN ORGANIZED HEALTH CARE EDUCATION/TRAINING PROGRAM

## 2020-09-22 PROCEDURE — 94003 VENT MGMT INPAT SUBQ DAY: CPT

## 2020-09-22 PROCEDURE — 94010 BREATHING CAPACITY TEST: CPT

## 2020-09-22 PROCEDURE — 25000003 PHARM REV CODE 250: Performed by: SURGERY

## 2020-09-22 PROCEDURE — A4217 STERILE WATER/SALINE, 500 ML: HCPCS | Performed by: STUDENT IN AN ORGANIZED HEALTH CARE EDUCATION/TRAINING PROGRAM

## 2020-09-22 PROCEDURE — C9113 INJ PANTOPRAZOLE SODIUM, VIA: HCPCS | Performed by: STUDENT IN AN ORGANIZED HEALTH CARE EDUCATION/TRAINING PROGRAM

## 2020-09-22 PROCEDURE — 20000000 HC ICU ROOM

## 2020-09-22 PROCEDURE — 80053 COMPREHEN METABOLIC PANEL: CPT

## 2020-09-22 PROCEDURE — 25000003 PHARM REV CODE 250: Performed by: INTERNAL MEDICINE

## 2020-09-22 PROCEDURE — 63600175 PHARM REV CODE 636 W HCPCS: Performed by: SURGERY

## 2020-09-22 PROCEDURE — 80197 ASSAY OF TACROLIMUS: CPT

## 2020-09-22 PROCEDURE — 99900026 HC AIRWAY MAINTENANCE (STAT)

## 2020-09-22 PROCEDURE — 99233 SBSQ HOSP IP/OBS HIGH 50: CPT | Mod: 24,,, | Performed by: SURGERY

## 2020-09-22 PROCEDURE — 99900017 HC EXTUBATION W/PARAMETERS (STAT)

## 2020-09-22 PROCEDURE — 36600 WITHDRAWAL OF ARTERIAL BLOOD: CPT

## 2020-09-22 PROCEDURE — 94668 MNPJ CHEST WALL SBSQ: CPT

## 2020-09-22 PROCEDURE — 99900035 HC TECH TIME PER 15 MIN (STAT)

## 2020-09-22 PROCEDURE — 84478 ASSAY OF TRIGLYCERIDES: CPT

## 2020-09-22 PROCEDURE — B4185 PARENTERAL SOL 10 GM LIPIDS: HCPCS | Performed by: STUDENT IN AN ORGANIZED HEALTH CARE EDUCATION/TRAINING PROGRAM

## 2020-09-22 PROCEDURE — 82803 BLOOD GASES ANY COMBINATION: CPT

## 2020-09-22 PROCEDURE — 84100 ASSAY OF PHOSPHORUS: CPT

## 2020-09-22 PROCEDURE — 83735 ASSAY OF MAGNESIUM: CPT

## 2020-09-22 PROCEDURE — 94150 VITAL CAPACITY TEST: CPT

## 2020-09-22 RX ORDER — OLANZAPINE 5 MG/1
10 TABLET, ORALLY DISINTEGRATING ORAL NIGHTLY
Status: DISCONTINUED | OUTPATIENT
Start: 2020-09-22 | End: 2020-09-22

## 2020-09-22 RX ORDER — OLANZAPINE 5 MG/1
10 TABLET, ORALLY DISINTEGRATING ORAL NIGHTLY
Status: DISCONTINUED | OUTPATIENT
Start: 2020-09-23 | End: 2020-09-25

## 2020-09-22 RX ADMIN — HEPARIN SODIUM 5000 UNITS: 5000 INJECTION INTRAVENOUS; SUBCUTANEOUS at 05:09

## 2020-09-22 RX ADMIN — PIPERACILLIN SODIUM AND TAZOBACTAM SODIUM 4.5 G: 4; .5 INJECTION, POWDER, LYOPHILIZED, FOR SOLUTION INTRAVENOUS at 11:09

## 2020-09-22 RX ADMIN — ACETAMINOPHEN 650 MG: 325 TABLET ORAL at 07:09

## 2020-09-22 RX ADMIN — POTASSIUM CHLORIDE 60 MEQ: 14.9 INJECTION, SOLUTION INTRAVENOUS at 04:09

## 2020-09-22 RX ADMIN — I.V. FAT EMULSION 250 ML: 20 EMULSION INTRAVENOUS at 10:09

## 2020-09-22 RX ADMIN — OLANZAPINE 10 MG: 5 TABLET, ORALLY DISINTEGRATING ORAL at 10:09

## 2020-09-22 RX ADMIN — MAGNESIUM SULFATE 2 G: 2 INJECTION INTRAVENOUS at 05:09

## 2020-09-22 RX ADMIN — DEXMEDETOMIDINE HYDROCHLORIDE 0.6 MCG/KG/HR: 4 INJECTION, SOLUTION INTRAVENOUS at 04:09

## 2020-09-22 RX ADMIN — SODIUM PHOSPHATE, MONOBASIC, MONOHYDRATE 15 MMOL: 276; 142 INJECTION, SOLUTION INTRAVENOUS at 08:09

## 2020-09-22 RX ADMIN — MAGNESIUM SULFATE HEPTAHYDRATE: 500 INJECTION, SOLUTION INTRAMUSCULAR; INTRAVENOUS at 10:09

## 2020-09-22 RX ADMIN — HEPARIN SODIUM 5000 UNITS: 5000 INJECTION INTRAVENOUS; SUBCUTANEOUS at 10:09

## 2020-09-22 RX ADMIN — PIPERACILLIN SODIUM AND TAZOBACTAM SODIUM 4.5 G: 4; .5 INJECTION, POWDER, LYOPHILIZED, FOR SOLUTION INTRAVENOUS at 05:09

## 2020-09-22 RX ADMIN — TACROLIMUS 0.5 MG: 0.5 CAPSULE ORAL at 05:09

## 2020-09-22 RX ADMIN — PANTOPRAZOLE SODIUM 40 MG: 40 INJECTION, POWDER, LYOPHILIZED, FOR SOLUTION INTRAVENOUS at 08:09

## 2020-09-22 RX ADMIN — TACROLIMUS 0.5 MG: 0.5 CAPSULE ORAL at 07:09

## 2020-09-22 RX ADMIN — PIPERACILLIN SODIUM AND TAZOBACTAM SODIUM 4.5 G: 4; .5 INJECTION, POWDER, LYOPHILIZED, FOR SOLUTION INTRAVENOUS at 02:09

## 2020-09-22 RX ADMIN — HEPARIN SODIUM 5000 UNITS: 5000 INJECTION INTRAVENOUS; SUBCUTANEOUS at 01:09

## 2020-09-22 RX ADMIN — FLUCONAZOLE, SODIUM CHLORIDE 200 MG: 2 INJECTION INTRAVENOUS at 02:09

## 2020-09-22 RX ADMIN — DEXMEDETOMIDINE HYDROCHLORIDE 0.4 MCG/KG/HR: 4 INJECTION, SOLUTION INTRAVENOUS at 05:09

## 2020-09-22 RX ADMIN — INSULIN ASPART 2 UNITS: 100 INJECTION, SOLUTION INTRAVENOUS; SUBCUTANEOUS at 11:09

## 2020-09-22 NOTE — PROGRESS NOTES
[facility]  Critical Care - Surgery  Progress Note    Patient Name: Elda Hernandez  MRN: [id number]  Admission Date: 9/3/2020  Hospital Length of Stay: 19 days  Code Status: Full Code  Attending Provider: Clark Rodriguez MD  Primary Care Provider: Jordana Woods MD   Principal Problem: Perforated abdominal viscus    Subjective:     Hospital/ICU Course:  9/4: Pt admitted to SICU following ex lap for GI perf. Intubated, sedated. Wound vac in place.  Soon after arrival to unit a mottled appearance to RUE was noted.  Vascular consulted and US revealed R radial artery thrombosis.    9/5: Tachycardic..  R arm appearance greatly improved overnight.    9/6: Patient returned to OR for abdominal washout, closure. R arm with dopplerable signals to ulnar artery and palmar arch.   9/7: Attempted to wean patient off of sedation in an effort to move towards extubation. Patient weaned off of propofol. Precedex started. Patient's heart rate very labile and sensitize to sedation.   9/8: patient extubated  9/9: SHANICE. Labetalol PRN for HTN   9/10:  Diuresis increased.  Cr downtrending  9/11: Increased abdominal distension and elevated WBC count. CT abd  9/12: WBC to 45 today. Diflucan and zosyn initiated. Lactulose enema w/ improving mental status  9/13: WBC improved. Mental status improving. FWF initiated for hypernatremia.   9/14: White count continues to improve.  Oriented to person only this morning.    9/17: tolerating tube feeds, half TPN rate today. Possible step down   9/18: possible fall out of bed overnight. High G tube residual, now to gravity. Requiring comfortflo  9/19: s/p exlap. Intubated ans sedated. Weaning to extubate  9/20: No acute events overnight. Pt remains intubated, sedated. Plan to wean sedation for extubation.  9/21: Abdominal cultures grew Klebsiella; pt on zosyn  9/22: Pt did well with SBT yesterday; back on rate overnight.  Will try again today with hopes to extubate    Interval  History/Significant Events: NAEO.  Pt alert and following commands this morning.  Plan for SBT and possible extubation today    Follow-up For: Procedure(s) (LRB):  LAPAROTOMY, EXPLORATORY, DRAINAGE OF ABSCESS, REPAIR OF GASTRIC PERFORATION, GASTROSTOMY TUBE PLACEMENT (N/A)  APPLICATION, WOUND VAC (N/A)    Post-Operative Day: 4 Days Post-Op    Objective:     Vital Signs (Most Recent):  Temp: 100.1 °F (37.8 °C) (09/22/20 0300)  Pulse: 84 (09/22/20 0630)  Resp: 20 (09/22/20 0630)  BP: (!) 101/51 (09/22/20 0630)  SpO2: 100 % (09/22/20 0630) Vital Signs (24h Range):  Temp:  [98.9 °F (37.2 °C)-100.1 °F (37.8 °C)] 100.1 °F (37.8 °C)  Pulse:  [] 84  Resp:  [17-47] 20  SpO2:  [91 %-100 %] 100 %  BP: ()/(49-83) 101/51  Arterial Line BP: ()/(53-87) 80/75     Weight: 67.6 kg (149 lb)  Body mass index is 30.09 kg/m².      Intake/Output Summary (Last 24 hours) at 9/22/2020 0742  Last data filed at 9/22/2020 0600  Gross per 24 hour   Intake 3311.07 ml   Output 2329 ml   Net 982.07 ml       Physical Exam  Vitals signs and nursing note reviewed.   Constitutional:       Comments: Pt sedated, intubated   HENT:      Head: Normocephalic and atraumatic.      Nose:      Comments: NG tube in place     Mouth/Throat:      Mouth: Mucous membranes are moist.   Neck:      Comments: RIJ central line  Cardiovascular:      Rate and Rhythm: Normal rate and regular rhythm.   Pulmonary:      Comments: Intubated, mechanically ventilated  Abdominal:      Palpations: Abdomen is soft.      Comments: Midline incision with wound vac in place; excellent seal to wound vac.   L abd drains in place with serous output; G tube with serous output;      Genitourinary:     Comments: L groin with improving ecchymosis  Bhardwaj in place  Skin:     General: Skin is warm and dry.   Neurological:      Mental Status: She is alert.      Comments: Patient remains sedated         Vents:  Vent Mode: A/C (09/22/20 7399)  Ventilator Initiated: Yes (09/18/20  1648)  Set Rate: 16 BPM (09/22/20 0449)  Vt Set: 350 mL (09/22/20 0449)  Pressure Support: 5 cmH20 (09/22/20 0443)  PEEP/CPAP: 5 cmH20 (09/22/20 0449)  Oxygen Concentration (%): 40 (09/22/20 0449)  Peak Airway Pressure: 21 cmH2O (09/22/20 0449)  Plateau Pressure: 20 cmH20 (09/21/20 0730)  Total Ve: 12.6 mL (09/22/20 0449)  Negative Inspiratory Force (cm H2O): -35 (09/08/20 1607)  F/VT Ratio<105 (RSBI): (!) 63.93 (09/22/20 0449)    Lines/Drains/Airways     Central Venous Catheter Line            Percutaneous Central Line Insertion/Assessment - Triple Lumen  09/04/20 0800 right internal jugular 17 days          Drain                 Urethral Catheter 09/14/20 1330 7 days         Closed/Suction Drain 09/18/20 1555 Left Abdomen Bulb 19 Fr. 3 days         Closed/Suction Drain 09/18/20 1559 Left Abdomen Bulb 19 Fr. 3 days         Gastrostomy/Enterostomy 09/18/20 1612 Other (see comments) LUQ decompression;feeding 3 days         NG/OG Tube 09/18/20 1500 Right nostril 3 days          Airway                 Airway - Non-Surgical 09/18/20 1519 Endotracheal Tube 3 days          Peripheral Intravenous Line                 Peripheral IV - Single Lumen 09/20/20 1017 22 G Posterior;Right Hand 1 day                Significant Labs:    CBC/Anemia Profile:  Recent Labs   Lab 09/21/20  0300 09/22/20  0323   WBC 9.79 7.21   HGB 7.5* 7.2*   HCT 24.4* 23.5*    132*   MCV 94 92   RDW 17.1* 16.7*        Chemistries:  Recent Labs   Lab 09/21/20  0300 09/22/20  0324    144   K 3.5 3.4*   * 115*   CO2 19* 19*   BUN 54* 43*   CREATININE 1.2 1.0   CALCIUM 8.1* 8.1*   ALBUMIN 1.2* 1.2*   PROT 5.4* 5.5*   BILITOT 2.8* 2.9*   ALKPHOS 224* 190*   ALT 34 28   AST 39 26   MG 2.0 1.7   PHOS 3.2 2.4*       All pertinent labs within the past 24 hours have been reviewed.    Significant Imaging:  I have reviewed all pertinent imaging results/findings within the past 24 hours.    Assessment/Plan:     * Perforated abdominal  viscus  Neuro:  -sedation: precedex gtt, propofol off yesterday (9/21)  -analgesia: PRN oxy. Dilaudid  - haldol q6hr PRN for agitation     CV:   - levo gtt, goal MAP > 65mmhg    Pulm:  - Intubated, mechanically ventilated  - SBT; today's goal is vent liberation  - Duonebs Q6 PRN  Vent Mode: A/C  Oxygen Concentration (%):  [40] 40  Resp Rate Total:  [23 br/min-47 br/min] 23 br/min  Vt Set:  [350 mL] 350 mL  PEEP/CPAP:  [5 cmH20] 5 cmH20  Pressure Support:  [5 cmH20-10 cmH20] 5 cmH20  Mean Airway Pressure:  [7 xwK49-60.2 cmH20] 10 cmH20     FEN/GI: Perforated stomach; s/p washout and patch (9/4); abdominal closure (9/6)  - s/p ex-lap on 9/19   - continue to hold tube feeds at this time  - NG tube, G tube, and L abdominal drains in place with serous output  - G tube upsized  - Lactulose discontinued (9/14) in setting of normal ammonia level (9/10) and improving mental status  - Daily metabolic panel with PRN repletion of electrolytes  - pantoprazole for ulcer ppx  - Cont TPN     Renal:  - Continue to monitor with daily metabolic labs  - nephrology on board, appreciate recs  - Cr 1.0 today; continues to improve     HEME/ID:   - Afebrile >24 hours  - H/H stable    - Daily CBC  - s/p Liver transplant in 2002; Daily tacrolimus level with AM labs. Per hepatology, continue tacrolimus 1mg BID  - afebrile since yest AM; acetaminophen PRN  - Diflucan and Zosyn  - heparin for DVT ppx  - Abdominal culture grew Klebsiella pneumonia; sensitive to zosyn    Endo:  - Synthroid 40mcg daily  - insulin aspart for blood glucose control    Dispo:  - Continue SICU care         John Spivey MD  Critical Care - Surgery  [facility]

## 2020-09-22 NOTE — PROGRESS NOTES
Pt extubated to 3L NC. RT and MD at bedside. Pt stable and tolerating well.  notified upon arrival.  Will continue to monitor.

## 2020-09-22 NOTE — PROGRESS NOTES
Pt afebrile, sats >95% on 3L NC. MAP >65 SBP < 140. TPN/precedex gtts currently infusing. Accuchecks q4h, 2 units insulin coverage required on shift. Electrolytes replaced in AM. POC reviewed with pt and , no questions at this time. Will continue to monitor.

## 2020-09-22 NOTE — ASSESSMENT & PLAN NOTE
Neuro:  -sedation: precedex gtt, propofol off yesterday (9/21)  -analgesia: PRN oxy. Dilaudid  - haldol q6hr PRN for agitation     CV:   - levo gtt, goal MAP > 65mmhg    Pulm:  - Intubated, mechanically ventilated  - SBT; today's goal is vent liberation  - Duonebs Q6 PRN  Vent Mode: A/C  Oxygen Concentration (%):  [40] 40  Resp Rate Total:  [23 br/min-47 br/min] 23 br/min  Vt Set:  [350 mL] 350 mL  PEEP/CPAP:  [5 cmH20] 5 cmH20  Pressure Support:  [5 cmH20-10 cmH20] 5 cmH20  Mean Airway Pressure:  [7 nsS86-14.2 cmH20] 10 cmH20     FEN/GI: Perforated stomach; s/p washout and patch (9/4); abdominal closure (9/6)  - s/p ex-lap on 9/19   - continue to hold tube feeds at this time  - NG tube, G tube, and L abdominal drains in place with serous output  - G tube upsized  - Lactulose discontinued (9/14) in setting of normal ammonia level (9/10) and improving mental status  - Daily metabolic panel with PRN repletion of electrolytes  - pantoprazole for ulcer ppx  - Cont TPN     Renal:  - Continue to monitor with daily metabolic labs  - nephrology on board, appreciate recs  - Cr 1.0 today; continues to improve     HEME/ID:   - Afebrile >24 hours  - H/H stable    - Daily CBC  - s/p Liver transplant in 2002; Daily tacrolimus level with AM labs. Per hepatology, continue tacrolimus 1mg BID  - afebrile since yest AM; acetaminophen PRN  - Diflucan and Zosyn  - heparin for DVT ppx  - Abdominal culture grew Klebsiella pneumonia; sensitive to zosyn    Endo:  - Synthroid 40mcg daily  - insulin aspart for blood glucose control    Dispo:  - Continue SICU care

## 2020-09-22 NOTE — SUBJECTIVE & OBJECTIVE
Interval History/Significant Events: NAEO.  Pt alert and following commands this morning.  Plan for SBT and possible extubation today    Follow-up For: Procedure(s) (LRB):  LAPAROTOMY, EXPLORATORY, DRAINAGE OF ABSCESS, REPAIR OF GASTRIC PERFORATION, GASTROSTOMY TUBE PLACEMENT (N/A)  APPLICATION, WOUND VAC (N/A)    Post-Operative Day: 4 Days Post-Op    Objective:     Vital Signs (Most Recent):  Temp: 100.1 °F (37.8 °C) (09/22/20 0300)  Pulse: 84 (09/22/20 0630)  Resp: 20 (09/22/20 0630)  BP: (!) 101/51 (09/22/20 0630)  SpO2: 100 % (09/22/20 0630) Vital Signs (24h Range):  Temp:  [98.9 °F (37.2 °C)-100.1 °F (37.8 °C)] 100.1 °F (37.8 °C)  Pulse:  [] 84  Resp:  [17-47] 20  SpO2:  [91 %-100 %] 100 %  BP: ()/(49-83) 101/51  Arterial Line BP: ()/(53-87) 80/75     Weight: 67.6 kg (149 lb)  Body mass index is 30.09 kg/m².      Intake/Output Summary (Last 24 hours) at 9/22/2020 0742  Last data filed at 9/22/2020 0600  Gross per 24 hour   Intake 3311.07 ml   Output 2329 ml   Net 982.07 ml       Physical Exam  Vitals signs and nursing note reviewed.   Constitutional:       Comments: Pt sedated, intubated   HENT:      Head: Normocephalic and atraumatic.      Nose:      Comments: NG tube in place     Mouth/Throat:      Mouth: Mucous membranes are moist.   Neck:      Comments: RIJ central line  Cardiovascular:      Rate and Rhythm: Normal rate and regular rhythm.   Pulmonary:      Comments: Intubated, mechanically ventilated  Abdominal:      Palpations: Abdomen is soft.      Comments: Midline incision with wound vac in place; excellent seal to wound vac.   L abd drains in place with serous output; G tube with serous output;      Genitourinary:     Comments: L groin with improving ecchymosis  Bhardwaj in place  Skin:     General: Skin is warm and dry.   Neurological:      Mental Status: She is alert.      Comments: Patient remains sedated         Vents:  Vent Mode: A/C (09/22/20 8554)  Ventilator Initiated: Yes  (09/18/20 1648)  Set Rate: 16 BPM (09/22/20 0449)  Vt Set: 350 mL (09/22/20 0449)  Pressure Support: 5 cmH20 (09/22/20 0443)  PEEP/CPAP: 5 cmH20 (09/22/20 0449)  Oxygen Concentration (%): 40 (09/22/20 0449)  Peak Airway Pressure: 21 cmH2O (09/22/20 0449)  Plateau Pressure: 20 cmH20 (09/21/20 0730)  Total Ve: 12.6 mL (09/22/20 0449)  Negative Inspiratory Force (cm H2O): -35 (09/08/20 1607)  F/VT Ratio<105 (RSBI): (!) 63.93 (09/22/20 0449)    Lines/Drains/Airways     Central Venous Catheter Line            Percutaneous Central Line Insertion/Assessment - Triple Lumen  09/04/20 0800 right internal jugular 17 days          Drain                 Urethral Catheter 09/14/20 1330 7 days         Closed/Suction Drain 09/18/20 1555 Left Abdomen Bulb 19 Fr. 3 days         Closed/Suction Drain 09/18/20 1559 Left Abdomen Bulb 19 Fr. 3 days         Gastrostomy/Enterostomy 09/18/20 1612 Other (see comments) LUQ decompression;feeding 3 days         NG/OG Tube 09/18/20 1500 Right nostril 3 days          Airway                 Airway - Non-Surgical 09/18/20 1519 Endotracheal Tube 3 days          Peripheral Intravenous Line                 Peripheral IV - Single Lumen 09/20/20 1017 22 G Posterior;Right Hand 1 day                Significant Labs:    CBC/Anemia Profile:  Recent Labs   Lab 09/21/20  0300 09/22/20  0323   WBC 9.79 7.21   HGB 7.5* 7.2*   HCT 24.4* 23.5*    132*   MCV 94 92   RDW 17.1* 16.7*        Chemistries:  Recent Labs   Lab 09/21/20  0300 09/22/20  0324    144   K 3.5 3.4*   * 115*   CO2 19* 19*   BUN 54* 43*   CREATININE 1.2 1.0   CALCIUM 8.1* 8.1*   ALBUMIN 1.2* 1.2*   PROT 5.4* 5.5*   BILITOT 2.8* 2.9*   ALKPHOS 224* 190*   ALT 34 28   AST 39 26   MG 2.0 1.7   PHOS 3.2 2.4*       All pertinent labs within the past 24 hours have been reviewed.    Significant Imaging:  I have reviewed all pertinent imaging results/findings within the past 24 hours.

## 2020-09-22 NOTE — TREATMENT PLAN
Hepatology Treatment Plan    Elda Hernandez is a 50 y.o. female admitted to hospital 9/3/2020 (Hospital Day: 20) due to Perforated abdominal viscus. Hepatology following for immunosuppression management.    Lab Results   Component Value Date    TACROLIMUS <1.5 (L) 09/22/2020    TACROLIMUS 2.1 (L) 09/21/2020    TACROLIMUS 2.5 (L) 09/20/2020       Lab Results   Component Value Date    CREATININE 1.0 09/22/2020    CREATININE 1.2 09/21/2020    CREATININE 1.5 (H) 09/20/2020       Lab Results   Component Value Date    ALT 28 09/22/2020    AST 26 09/22/2020     (H) 04/07/2018    ALKPHOS 190 (H) 09/22/2020    BILITOT 2.9 (H) 09/22/2020    WBC 7.21 09/22/2020    HGB 7.2 (L) 09/22/2020     (L) 09/22/2020       Kidney function is stable  Liver enzymes are stable    Plan  - Continue immunosuppression regimen without changes    Immunosuppression Regimen:  Tacrolimus: 0.5mg qAM, 0.5mg qPM sublingual    Please notify hepatology on day of discharge to discuss discharge medications and follow up.     Please obtain daily CBC, BMP, LFT, INR, immunosuppressant trough level    Thank you for involving us in the care of Elda Hernandez. Please call with any additional concerns or questions.    John Mustafa MD  Gastroenterology Fellow

## 2020-09-22 NOTE — PROGRESS NOTES
[facility]  General Surgery  Progress Note    Subjective:     History of Present Illness:  Ms. Hernandez is a 51yo F w/PMH of von Gierke disease s/p liver transplant (2002, on tacro + MMF, follows Dr. Nielsen), hx chronic rejection (bx 2015 with acute and chronic rejection s/p steroids), biliary stricture and ductopenic rejection, recently with cirrhosis on fibroscan (10/2019), HTN, and depression who presents as a transfer for further evaluation of gastric outlet obstruction and esophageal stricturing.  Patient decompensated requiring multiple pressors and intubation. CT scan showed free air. Prior to intubation patient reported severe abdominal pain.   states that patient has had epigastric pain, nausea, and vomiting for several days.     Post-Op Info:  Procedure(s) (LRB):  LAPAROTOMY, EXPLORATORY, DRAINAGE OF ABSCESS, REPAIR OF GASTRIC PERFORATION, GASTROSTOMY TUBE PLACEMENT (N/A)  APPLICATION, WOUND VAC (N/A)   4 Days Post-Op     Interval History: NAEON. Off levo and propofol.    Medications:  Continuous Infusions:   dexmedetomidine (PRECEDEX) infusion 0.6 mcg/kg/hr (09/21/20 0700)    lactated ringers 50 mL/hr at 09/21/20 0700    norepinephrine bitartrate-D5W 0.06 mcg/kg/min (09/21/20 0700)    propofoL 20 mcg/kg/min (09/21/20 0749)    TPN ADULT CENTRAL LINE CUSTOM 40 mL/hr at 09/21/20 0600     Scheduled Meds:   fluconazole (DIFLUCAN) IVPB  200 mg Intravenous Q24H    heparin (porcine)  5,000 Units Subcutaneous Q8H    pantoprazole  40 mg Intravenous Daily    piperacillin-tazobactam (ZOSYN) IVPB  4.5 g Intravenous Q8H    tacrolimus  0.5 mg Sublingual Daily AM     PRN Meds:sodium chloride, acetaminophen, albuterol-ipratropium, calcium gluconate IVPB, calcium gluconate IVPB, calcium gluconate IVPB, dextrose 50%, dextrose 50%, glucagon (human recombinant), glucose, glucose, haloperidol lactate, hydrALAZINE, insulin aspart U-100, labetaloL, magnesium sulfate IVPB, magnesium sulfate  Please call or return if your testicular pain recurs and persists. Schedule a physical with Dr. Mirela Olson within the next 3 months. IVPB, ondansetron, potassium chloride in water **AND** potassium chloride in water **AND** potassium chloride in water, promethazine, sodium chloride 0.9%, sodium chloride 0.9%, sodium phosphate IVPB, sodium phosphate IVPB, sodium phosphate IVPB     Review of patient's allergies indicates:   Allergen Reactions    Codeine Itching     Other reaction(s): Itching    Lipitor [atorvastatin] Other (See Comments)     Other reaction(s): Muscle pain  Muscle cranmps    Morphine Itching     Other reaction(s): nausea and vomiting     Zoloft [sertraline] Other (See Comments)     Tremors/muscle spasms     Objective:     Vital Signs (Most Recent):  Temp: 99.3 °F (37.4 °C) (09/21/20 0300)  Pulse: 65 (09/21/20 0730)  Resp: 19 (09/21/20 0730)  BP: (!) 106/54 (09/21/20 0600)  SpO2: 100 % (09/21/20 0730) Vital Signs (24h Range):  Temp:  [98.8 °F (37.1 °C)-101 °F (38.3 °C)] 99.3 °F (37.4 °C)  Pulse:  [56-81] 65  Resp:  [14-41] 19  SpO2:  [88 %-100 %] 100 %  BP: (101-137)/(51-63) 106/54  Arterial Line BP: ()/(46-84) 96/53     Weight: 67.6 kg (149 lb)  Body mass index is 30.09 kg/m².    Intake/Output - Last 3 Shifts       09/19 0700 - 09/20 0659 09/20 0700 - 09/21 0659 09/21 0700 - 09/22 0659    I.V. (mL/kg) 1225.7 (18.1) 2344 (34.7)     NG/ 50     IV Piggyback 200 1000      1088     Total Intake(mL/kg) 2408.7 (35.6) 4482 (66.3)     Urine (mL/kg/hr) 1245 (0.8) 1630 (1) 75 (1)    Drains 246 1076     Total Output 1491 2706 75    Net +917.7 +1776 -75                 Physical Exam  Constitutional:       General: She is not in acute distress.  HENT:      Head: Normocephalic and atraumatic.   Cardiovascular:      Rate and Rhythm: Regular rhythm. Tachycardia present.   Pulmonary:      Effort: Pulmonary effort is normal. No respiratory distress.      Comments: Intubated and sedated  Abdominal:      General: There is no distension.      Palpations: Abdomen is soft.      Tenderness: There is no abdominal tenderness.       Comments: Incision c/d/i  G tube to gravity, minimal drainage on drain sponge  Abdominal binder in place   Neurological:      General: No focal deficit present.      Mental Status: She is alert and oriented to person, place, and time.   Psychiatric:         Mood and Affect: Mood normal.         Behavior: Behavior normal.         Significant Labs:  CBC:   Recent Labs   Lab 09/21/20  0300   WBC 9.79   RBC 2.61*   HGB 7.5*   HCT 24.4*      MCV 94   MCH 28.7   MCHC 30.7*     CMP:   Recent Labs   Lab 09/21/20  0300   *   CALCIUM 8.1*   ALBUMIN 1.2*   PROT 5.4*      K 3.5   CO2 19*   *   BUN 54*   CREATININE 1.2   ALKPHOS 224*   ALT 34   AST 39   BILITOT 2.8*       Significant Diagnostics:  I have reviewed all pertinent imaging results/findings within the past 24 hours.    Assessment/Plan:     * Perforated abdominal viscus  50 y.o. female w/ free air on CT scan. S/p emergent ex lap on 9/4 w/ findings of gastric perforation, primary repair. Now w/  abdominal closure on 9/6, skin stapled closed, KERRY drains removed. Class A to OR on 9/18 for free air and extrav or oral contrast seen on CT     Continue SICU care, appreciate their assistance  G tube and NG to gravity/suction  Wean vent to extubate        Rocio Hidalgo MD  General Surgery  [facility]

## 2020-09-22 NOTE — PROGRESS NOTES
RICHAR Skin Integrity Evaluation      Subjective    RICHAR skin integrity champion evaluation of patient as part of the comprehensive skin care team .       Elda Hernandez is being evaluated as per the Epic  pressure injury skin report.  She has been admitted to SICU for 19 days .   Skin injury was noted on 9/12/20, 9/13/20            Limited Physical exam     Lesion 1:  Left upper leg            Lesion 2: Buttocks        Assessment :       Lesion 1:  Alteration in Skin Integrity    POA : no    Consults: Wound Care           Lesion 2:  Alteration in Skin Integrity Blanchable pinkness, skin intact    POA : no    Consults:Wound Care      Follow up:    Patient will be re evaluated weekly.

## 2020-09-22 NOTE — RESPIRATORY THERAPY
Patient was on spontaneous PS 5/5 since yesterday and throughout the night. Tolerated well until this morning. Patient was labored, breathing fast and not receiving tidal volumes. Placed back on a rate with documented vent settings. ABG obtained.  MD aware.

## 2020-09-22 NOTE — PLAN OF CARE
SICU PLAN OF CARE    Shift Events/Plan: off levo gtt. Vent on AC. Electrolytes currently replacing. Plan to hopefully extubate today.    Vitals: /50, 100% spO2 on vent, temp 99.5, HR 83 NSR.    Gtts: TPN, Lipids, MIVF, Precedex 0.6 mcg/kg/hr.     Neuro: Arouses to voice, follows commands, QUIROS.     Respiratory: Back on rate at 0500 d/t tachypnea, having been on spontaneous all day and all night. Now AC 40% FiO2 5 PEEP    Drains: KERRY 1: 10 ccs, KERRY 2: ~115 cc's foul-smelling, cloudy.  ccs. G tube to gravity 210 cc's cloudy and foul smell.     GI/: Urine output 60-75 cc's/hr    Skin: Turned q2 hours, immerse mattress in use, pressure points protected, no new breakdown noted

## 2020-09-23 LAB
ALBUMIN SERPL BCP-MCNC: 1.1 G/DL (ref 3.5–5.2)
ALBUMIN SERPL BCP-MCNC: 1.1 G/DL (ref 3.5–5.2)
ALP SERPL-CCNC: 184 U/L (ref 55–135)
ALP SERPL-CCNC: 192 U/L (ref 55–135)
ALT SERPL W/O P-5'-P-CCNC: 25 U/L (ref 10–44)
ALT SERPL W/O P-5'-P-CCNC: 25 U/L (ref 10–44)
ANION GAP SERPL CALC-SCNC: 10 MMOL/L (ref 8–16)
ANION GAP SERPL CALC-SCNC: 9 MMOL/L (ref 8–16)
AST SERPL-CCNC: 27 U/L (ref 10–40)
AST SERPL-CCNC: 33 U/L (ref 10–40)
BASOPHILS # BLD AUTO: 0.03 K/UL (ref 0–0.2)
BASOPHILS NFR BLD: 0.3 % (ref 0–1.9)
BILIRUB SERPL-MCNC: 2.9 MG/DL (ref 0.1–1)
BILIRUB SERPL-MCNC: 3.1 MG/DL (ref 0.1–1)
BUN SERPL-MCNC: 38 MG/DL (ref 6–20)
BUN SERPL-MCNC: 40 MG/DL (ref 6–20)
CALCIUM SERPL-MCNC: 8.2 MG/DL (ref 8.7–10.5)
CALCIUM SERPL-MCNC: 8.3 MG/DL (ref 8.7–10.5)
CHLORIDE SERPL-SCNC: 115 MMOL/L (ref 95–110)
CHLORIDE SERPL-SCNC: 117 MMOL/L (ref 95–110)
CO2 SERPL-SCNC: 18 MMOL/L (ref 23–29)
CO2 SERPL-SCNC: 19 MMOL/L (ref 23–29)
CREAT SERPL-MCNC: 0.9 MG/DL (ref 0.5–1.4)
CREAT SERPL-MCNC: 1 MG/DL (ref 0.5–1.4)
DIFFERENTIAL METHOD: ABNORMAL
EOSINOPHIL # BLD AUTO: 0.3 K/UL (ref 0–0.5)
EOSINOPHIL NFR BLD: 2.6 % (ref 0–8)
ERYTHROCYTE [DISTWIDTH] IN BLOOD BY AUTOMATED COUNT: 16.7 % (ref 11.5–14.5)
EST. GFR  (AFRICAN AMERICAN): >60 ML/MIN/1.73 M^2
EST. GFR  (AFRICAN AMERICAN): >60 ML/MIN/1.73 M^2
EST. GFR  (NON AFRICAN AMERICAN): >60 ML/MIN/1.73 M^2
EST. GFR  (NON AFRICAN AMERICAN): >60 ML/MIN/1.73 M^2
GLUCOSE SERPL-MCNC: 107 MG/DL (ref 70–110)
GLUCOSE SERPL-MCNC: 124 MG/DL (ref 70–110)
HCT VFR BLD AUTO: 24.4 % (ref 37–48.5)
HGB BLD-MCNC: 7.5 G/DL (ref 12–16)
IMM GRANULOCYTES # BLD AUTO: 0.34 K/UL (ref 0–0.04)
IMM GRANULOCYTES NFR BLD AUTO: 3.2 % (ref 0–0.5)
LYMPHOCYTES # BLD AUTO: 1.1 K/UL (ref 1–4.8)
LYMPHOCYTES NFR BLD: 10.1 % (ref 18–48)
MAGNESIUM SERPL-MCNC: 1.8 MG/DL (ref 1.6–2.6)
MCH RBC QN AUTO: 28 PG (ref 27–31)
MCHC RBC AUTO-ENTMCNC: 30.7 G/DL (ref 32–36)
MCV RBC AUTO: 91 FL (ref 82–98)
MONOCYTES # BLD AUTO: 1.1 K/UL (ref 0.3–1)
MONOCYTES NFR BLD: 10.8 % (ref 4–15)
NEUTROPHILS # BLD AUTO: 7.7 K/UL (ref 1.8–7.7)
NEUTROPHILS NFR BLD: 73 % (ref 38–73)
NRBC BLD-RTO: 0 /100 WBC
PHOSPHATE SERPL-MCNC: 2.4 MG/DL (ref 2.7–4.5)
PLATELET # BLD AUTO: 184 K/UL (ref 150–350)
PLATELET BLD QL SMEAR: ABNORMAL
PMV BLD AUTO: 12.2 FL (ref 9.2–12.9)
POCT GLUCOSE: 106 MG/DL (ref 70–110)
POCT GLUCOSE: 110 MG/DL (ref 70–110)
POCT GLUCOSE: 114 MG/DL (ref 70–110)
POCT GLUCOSE: 120 MG/DL (ref 70–110)
POCT GLUCOSE: 120 MG/DL (ref 70–110)
POCT GLUCOSE: 124 MG/DL (ref 70–110)
POCT GLUCOSE: 153 MG/DL (ref 70–110)
POTASSIUM SERPL-SCNC: 3.4 MMOL/L (ref 3.5–5.1)
POTASSIUM SERPL-SCNC: 4.2 MMOL/L (ref 3.5–5.1)
PROT SERPL-MCNC: 5.8 G/DL (ref 6–8.4)
PROT SERPL-MCNC: 5.9 G/DL (ref 6–8.4)
RBC # BLD AUTO: 2.68 M/UL (ref 4–5.4)
SODIUM SERPL-SCNC: 144 MMOL/L (ref 136–145)
SODIUM SERPL-SCNC: 144 MMOL/L (ref 136–145)
TACROLIMUS BLD-MCNC: 2.6 NG/ML (ref 5–15)
TOXIC GRANULES BLD QL SMEAR: PRESENT
WBC # BLD AUTO: 10.58 K/UL (ref 3.9–12.7)

## 2020-09-23 PROCEDURE — 97164 PT RE-EVAL EST PLAN CARE: CPT

## 2020-09-23 PROCEDURE — 63600175 PHARM REV CODE 636 W HCPCS: Performed by: STUDENT IN AN ORGANIZED HEALTH CARE EDUCATION/TRAINING PROGRAM

## 2020-09-23 PROCEDURE — C9113 INJ PANTOPRAZOLE SODIUM, VIA: HCPCS | Performed by: STUDENT IN AN ORGANIZED HEALTH CARE EDUCATION/TRAINING PROGRAM

## 2020-09-23 PROCEDURE — A4217 STERILE WATER/SALINE, 500 ML: HCPCS | Performed by: STUDENT IN AN ORGANIZED HEALTH CARE EDUCATION/TRAINING PROGRAM

## 2020-09-23 PROCEDURE — S0030 INJECTION, METRONIDAZOLE: HCPCS | Performed by: STUDENT IN AN ORGANIZED HEALTH CARE EDUCATION/TRAINING PROGRAM

## 2020-09-23 PROCEDURE — 94668 MNPJ CHEST WALL SBSQ: CPT

## 2020-09-23 PROCEDURE — 97110 THERAPEUTIC EXERCISES: CPT

## 2020-09-23 PROCEDURE — 83735 ASSAY OF MAGNESIUM: CPT

## 2020-09-23 PROCEDURE — B4185 PARENTERAL SOL 10 GM LIPIDS: HCPCS | Performed by: STUDENT IN AN ORGANIZED HEALTH CARE EDUCATION/TRAINING PROGRAM

## 2020-09-23 PROCEDURE — 94761 N-INVAS EAR/PLS OXIMETRY MLT: CPT

## 2020-09-23 PROCEDURE — 20000000 HC ICU ROOM

## 2020-09-23 PROCEDURE — 84100 ASSAY OF PHOSPHORUS: CPT

## 2020-09-23 PROCEDURE — 80053 COMPREHEN METABOLIC PANEL: CPT | Mod: 91

## 2020-09-23 PROCEDURE — 25000003 PHARM REV CODE 250: Performed by: SURGERY

## 2020-09-23 PROCEDURE — 63600175 PHARM REV CODE 636 W HCPCS: Performed by: SURGERY

## 2020-09-23 PROCEDURE — 80197 ASSAY OF TACROLIMUS: CPT

## 2020-09-23 PROCEDURE — 80053 COMPREHEN METABOLIC PANEL: CPT

## 2020-09-23 PROCEDURE — 25000003 PHARM REV CODE 250: Performed by: STUDENT IN AN ORGANIZED HEALTH CARE EDUCATION/TRAINING PROGRAM

## 2020-09-23 PROCEDURE — 31720 CLEARANCE OF AIRWAYS: CPT

## 2020-09-23 PROCEDURE — 99900035 HC TECH TIME PER 15 MIN (STAT)

## 2020-09-23 PROCEDURE — 99233 SBSQ HOSP IP/OBS HIGH 50: CPT | Mod: 24,,, | Performed by: SURGERY

## 2020-09-23 PROCEDURE — 85025 COMPLETE CBC W/AUTO DIFF WBC: CPT

## 2020-09-23 PROCEDURE — 99233 PR SUBSEQUENT HOSPITAL CARE,LEVL III: ICD-10-PCS | Mod: 24,,, | Performed by: SURGERY

## 2020-09-23 PROCEDURE — 25000003 PHARM REV CODE 250: Performed by: INTERNAL MEDICINE

## 2020-09-23 PROCEDURE — 97530 THERAPEUTIC ACTIVITIES: CPT

## 2020-09-23 PROCEDURE — 97168 OT RE-EVAL EST PLAN CARE: CPT

## 2020-09-23 PROCEDURE — 63600175 PHARM REV CODE 636 W HCPCS: Mod: JG | Performed by: STUDENT IN AN ORGANIZED HEALTH CARE EDUCATION/TRAINING PROGRAM

## 2020-09-23 PROCEDURE — 27000221 HC OXYGEN, UP TO 24 HOURS

## 2020-09-23 RX ORDER — METRONIDAZOLE 500 MG/100ML
500 INJECTION, SOLUTION INTRAVENOUS
Status: DISCONTINUED | OUTPATIENT
Start: 2020-09-23 | End: 2020-09-25

## 2020-09-23 RX ORDER — LEVOTHYROXINE SODIUM ANHYDROUS 100 UG/5ML
40 INJECTION, POWDER, LYOPHILIZED, FOR SOLUTION INTRAVENOUS DAILY
Status: DISCONTINUED | OUTPATIENT
Start: 2020-09-23 | End: 2020-10-14 | Stop reason: HOSPADM

## 2020-09-23 RX ORDER — FUROSEMIDE 10 MG/ML
80 INJECTION INTRAMUSCULAR; INTRAVENOUS ONCE
Status: COMPLETED | OUTPATIENT
Start: 2020-09-23 | End: 2020-09-23

## 2020-09-23 RX ORDER — FENTANYL CITRATE 50 UG/ML
50 INJECTION, SOLUTION INTRAMUSCULAR; INTRAVENOUS ONCE
Status: COMPLETED | OUTPATIENT
Start: 2020-09-23 | End: 2020-09-23

## 2020-09-23 RX ORDER — FUROSEMIDE 10 MG/ML
80 INJECTION INTRAMUSCULAR; INTRAVENOUS 3 TIMES DAILY
Status: DISCONTINUED | OUTPATIENT
Start: 2020-09-23 | End: 2020-09-24

## 2020-09-23 RX ADMIN — METRONIDAZOLE 500 MG: 500 INJECTION, SOLUTION INTRAVENOUS at 05:09

## 2020-09-23 RX ADMIN — HEPARIN SODIUM 5000 UNITS: 5000 INJECTION INTRAVENOUS; SUBCUTANEOUS at 09:09

## 2020-09-23 RX ADMIN — POTASSIUM CHLORIDE 60 MEQ: 14.9 INJECTION, SOLUTION INTRAVENOUS at 05:09

## 2020-09-23 RX ADMIN — CEFTRIAXONE 2 G: 2 INJECTION, SOLUTION INTRAVENOUS at 10:09

## 2020-09-23 RX ADMIN — FUROSEMIDE 80 MG: 10 INJECTION, SOLUTION INTRAMUSCULAR; INTRAVENOUS at 09:09

## 2020-09-23 RX ADMIN — OLANZAPINE 10 MG: 5 TABLET, ORALLY DISINTEGRATING ORAL at 09:09

## 2020-09-23 RX ADMIN — SODIUM CHLORIDE, SODIUM LACTATE, POTASSIUM CHLORIDE, AND CALCIUM CHLORIDE 1000 ML: .6; .31; .03; .02 INJECTION, SOLUTION INTRAVENOUS at 05:09

## 2020-09-23 RX ADMIN — FUROSEMIDE 80 MG: 10 INJECTION, SOLUTION INTRAMUSCULAR; INTRAVENOUS at 08:09

## 2020-09-23 RX ADMIN — METRONIDAZOLE 500 MG: 500 INJECTION, SOLUTION INTRAVENOUS at 11:09

## 2020-09-23 RX ADMIN — INSULIN ASPART 2 UNITS: 100 INJECTION, SOLUTION INTRAVENOUS; SUBCUTANEOUS at 12:09

## 2020-09-23 RX ADMIN — MAGNESIUM SULFATE HEPTAHYDRATE: 500 INJECTION, SOLUTION INTRAMUSCULAR; INTRAVENOUS at 09:09

## 2020-09-23 RX ADMIN — FUROSEMIDE 80 MG: 10 INJECTION, SOLUTION INTRAMUSCULAR; INTRAVENOUS at 02:09

## 2020-09-23 RX ADMIN — LEVOTHYROXINE SODIUM ANHYDROUS 40 MCG: 100 INJECTION, POWDER, LYOPHILIZED, FOR SOLUTION INTRAVENOUS at 10:09

## 2020-09-23 RX ADMIN — PIPERACILLIN SODIUM AND TAZOBACTAM SODIUM 4.5 G: 4; .5 INJECTION, POWDER, LYOPHILIZED, FOR SOLUTION INTRAVENOUS at 02:09

## 2020-09-23 RX ADMIN — FENTANYL CITRATE 50 MCG: 50 INJECTION INTRAMUSCULAR; INTRAVENOUS at 08:09

## 2020-09-23 RX ADMIN — HEPARIN SODIUM 5000 UNITS: 5000 INJECTION INTRAVENOUS; SUBCUTANEOUS at 01:09

## 2020-09-23 RX ADMIN — TACROLIMUS 0.5 MG: 0.5 CAPSULE ORAL at 07:09

## 2020-09-23 RX ADMIN — MAGNESIUM SULFATE 2 G: 2 INJECTION INTRAVENOUS at 06:09

## 2020-09-23 RX ADMIN — I.V. FAT EMULSION 250 ML: 20 EMULSION INTRAVENOUS at 09:09

## 2020-09-23 RX ADMIN — ALTEPLASE 2 MG: 2.2 INJECTION, POWDER, LYOPHILIZED, FOR SOLUTION INTRAVENOUS at 07:09

## 2020-09-23 RX ADMIN — HEPARIN SODIUM 5000 UNITS: 5000 INJECTION INTRAVENOUS; SUBCUTANEOUS at 05:09

## 2020-09-23 RX ADMIN — PANTOPRAZOLE SODIUM 40 MG: 40 INJECTION, POWDER, LYOPHILIZED, FOR SOLUTION INTRAVENOUS at 08:09

## 2020-09-23 RX ADMIN — TACROLIMUS 0.5 MG: 0.5 CAPSULE ORAL at 05:09

## 2020-09-23 RX ADMIN — SODIUM PHOSPHATE, MONOBASIC, MONOHYDRATE 15 MMOL: 276; 142 INJECTION, SOLUTION INTRAVENOUS at 08:09

## 2020-09-23 NOTE — SUBJECTIVE & OBJECTIVE
Interval History/Significant Events: NAEO.  Pt doing well this morning.  Oriented to person and place this morning.  Denies pain and any new complaints.     Follow-up For: Procedure(s) (LRB):  LAPAROTOMY, EXPLORATORY, DRAINAGE OF ABSCESS, REPAIR OF GASTRIC PERFORATION, GASTROSTOMY TUBE PLACEMENT (N/A)  APPLICATION, WOUND VAC (N/A)    Post-Operative Day: 5 Days Post-Op    Objective:     Vital Signs (Most Recent):  Temp: 98.6 °F (37 °C) (09/23/20 0300)  Pulse: (!) 123 (09/23/20 0608)  Resp: (!) 36 (09/23/20 0608)  BP: 131/62 (09/23/20 0600)  SpO2: 97 % (09/23/20 0608) Vital Signs (24h Range):  Temp:  [98.3 °F (36.8 °C)-98.9 °F (37.2 °C)] 98.6 °F (37 °C)  Pulse:  [] 123  Resp:  [15-48] 36  SpO2:  [90 %-100 %] 97 %  BP: ()/(49-71) 131/62     Weight: 67.6 kg (149 lb)  Body mass index is 30.09 kg/m².      Intake/Output Summary (Last 24 hours) at 9/23/2020 0800  Last data filed at 9/23/2020 0600  Gross per 24 hour   Intake 2932.49 ml   Output 2178 ml   Net 754.49 ml       Physical Exam  Vitals signs and nursing note reviewed.   Constitutional:       General: She is not in acute distress.  HENT:      Head: Normocephalic and atraumatic.      Nose:      Comments: NG tube in place     Mouth/Throat:      Comments: Hoarse voice  Eyes:      Extraocular Movements: Extraocular movements intact.      Pupils: Pupils are equal, round, and reactive to light.   Neck:      Comments: RIJ central line  Cardiovascular:      Rate and Rhythm: Normal rate and regular rhythm.   Pulmonary:      Effort: Pulmonary effort is normal.      Breath sounds: Rhonchi (bilateral) present.   Abdominal:      General: There is no distension.      Palpations: Abdomen is soft.      Comments: Midline incision with wound vac in place; excellent seal to wound vac.   L abd drains in place with serous output; G tube with serous output;      Genitourinary:     Comments: L groin with improving ecchymosis  Bhardwaj in place  Skin:     General: Skin is warm and  dry.   Neurological:      Mental Status: She is alert.         Vents:  Vent Mode: A/C (09/22/20 0759)  Ventilator Initiated: Yes (09/18/20 1648)  Set Rate: 16 BPM (09/22/20 0759)  Vt Set: 350 mL (09/22/20 0759)  Pressure Support: 5 cmH20 (09/22/20 0443)  PEEP/CPAP: 5 cmH20 (09/22/20 0759)  Oxygen Concentration (%): 44 (09/23/20 0608)  Peak Airway Pressure: 21 cmH2O (09/22/20 0759)  Plateau Pressure: 19 cmH20 (09/22/20 0759)  Total Ve: 10.9 mL (09/22/20 0759)  Negative Inspiratory Force (cm H2O): -31 (09/22/20 1021)  F/VT Ratio<105 (RSBI): (!) 54.92 (09/22/20 0759)    Lines/Drains/Airways     Central Venous Catheter Line            Percutaneous Central Line Insertion/Assessment - Triple Lumen  09/04/20 0800 right internal jugular 19 days          Drain                 Urethral Catheter 09/14/20 1330 8 days         Closed/Suction Drain 09/18/20 1555 Left Abdomen Bulb 19 Fr. 4 days         Closed/Suction Drain 09/18/20 1559 Left Abdomen Bulb 19 Fr. 4 days         Gastrostomy/Enterostomy 09/18/20 1612 Other (see comments) LUQ decompression;feeding 4 days         NG/OG Tube 09/18/20 1500 Right nostril 4 days          Peripheral Intravenous Line                 Peripheral IV - Single Lumen 09/20/20 1017 22 G Posterior;Right Hand 2 days                Significant Labs:    CBC/Anemia Profile:  Recent Labs   Lab 09/22/20  0323 09/23/20  0343   WBC 7.21 10.58   HGB 7.2* 7.5*   HCT 23.5* 24.4*   * 184   MCV 92 91   RDW 16.7* 16.7*        Chemistries:  Recent Labs   Lab 09/22/20  0324 09/23/20  0343    144   K 3.4* 3.4*   * 117*   CO2 19* 18*   BUN 43* 38*   CREATININE 1.0 0.9   CALCIUM 8.1* 8.3*   ALBUMIN 1.2* 1.1*   PROT 5.5* 5.8*   BILITOT 2.9* 3.1*   ALKPHOS 190* 184*   ALT 28 25   AST 26 33   MG 1.7 1.8   PHOS 2.4* 2.4*       All pertinent labs within the past 24 hours have been reviewed.    Significant Imaging:  I have reviewed all pertinent imaging results/findings within the past 24 hours.

## 2020-09-23 NOTE — CARE UPDATE
Notified by nursing early this morning of acute oxygen desaturation and patient now requiring supplemental O2 by NC. Nursing concerned for pulmonary edema as nasotracheal suctioning with thin liquid secretions and patient with slightly positive fluid balance.     On examination, patient with very coarse lung sounds bilaterally, audible gurgle, some dependent edema but no anasarca. Patient with very weak cough and voice. CVP ~6 mmHg (normal value) and latest TTE 9/18 with normal biventricular function making cardiogenic pulmonary edema unlikely. CXR not suggestive of pulmonary edema (no widespread infiltrates or pleural effusions). Though slightly fluid positive (9/20-9/23 = 3.6L positive) over the past several days, patient with near resolution of JACQUE and with good native urine output. Given insensible losses from recent mechanical ventilation as well as integumentary losses especially in the setting of recent fevers, this is not suggestive of fluid overload.    Overall, do not think slight hypoxia and unpleasant lung sounds is secondary to jen pulmonary edema, but more likely patient's inability to clear upper airway secretions. Will attempt aggressive pulmonary toilet, nasotracheal suctioning, and diuresis, but concerned for possible need for reintubation for airway protection. Discussed during rounds with Dr. Bradley.     Carl Pino MD  Resident Physician, PGY3  Department of Anesthesiology / SICU

## 2020-09-23 NOTE — SUBJECTIVE & OBJECTIVE
Interval History: NAEON. On 6L high flow for desaturation episodes overnight.     Medications:  Continuous Infusions:   dexmedetomidine (PRECEDEX) infusion Stopped (09/22/20 2100)    TPN ADULT CENTRAL LINE CUSTOM 60 mL/hr at 09/23/20 0500     Scheduled Meds:   alteplase  2 mg Intra-Catheter Once    fat emulsion 20%  250 mL Intravenous Daily    fentaNYL  50 mcg Intravenous Once    fluconazole (DIFLUCAN) IVPB  200 mg Intravenous Q24H    furosemide (LASIX) IV  80 mg Intravenous Once    heparin (porcine)  5,000 Units Subcutaneous Q8H    olanzapine zydis  10 mg Sublingual QHS    pantoprazole  40 mg Intravenous Daily    piperacillin-tazobactam (ZOSYN) IVPB  4.5 g Intravenous Q8H    tacrolimus  0.5 mg Sublingual BID     PRN Meds:sodium chloride, acetaminophen, albuterol-ipratropium, calcium gluconate IVPB, calcium gluconate IVPB, calcium gluconate IVPB, dextrose 50%, dextrose 50%, glucagon (human recombinant), glucose, glucose, haloperidol lactate, insulin aspart U-100, labetaloL, magnesium sulfate IVPB, magnesium sulfate IVPB, ondansetron, potassium chloride in water **AND** potassium chloride in water **AND** potassium chloride in water, promethazine, sodium chloride 0.9%, sodium chloride 0.9%, sodium phosphate IVPB, sodium phosphate IVPB, sodium phosphate IVPB     Review of patient's allergies indicates:   Allergen Reactions    Codeine Itching     Other reaction(s): Itching    Lipitor [atorvastatin] Other (See Comments)     Other reaction(s): Muscle pain  Muscle cranmps    Morphine Itching     Other reaction(s): nausea and vomiting     Zoloft [sertraline] Other (See Comments)     Tremors/muscle spasms     Objective:     Vital Signs (Most Recent):  Temp: 98.6 °F (37 °C) (09/23/20 0300)  Pulse: (!) 123 (09/23/20 0608)  Resp: (!) 36 (09/23/20 0608)  BP: 131/62 (09/23/20 0600)  SpO2: 97 % (09/23/20 0608) Vital Signs (24h Range):  Temp:  [98.3 °F (36.8 °C)-98.9 °F (37.2 °C)] 98.6 °F (37 °C)  Pulse:  []  123  Resp:  [15-48] 36  SpO2:  [90 %-100 %] 97 %  BP: ()/(49-71) 131/62     Weight: 67.6 kg (149 lb)  Body mass index is 30.09 kg/m².    Intake/Output - Last 3 Shifts       09/21 0700 - 09/22 0659 09/22 0700 - 09/23 0659 09/23 0700 - 09/24 0659    I.V. (mL/kg) 2126.1 (31.5) 1538 (22.8)     NG/GT  70     IV Piggyback       TPN 1185 1344.5     Total Intake(mL/kg) 3311.1 (49) 2952.5 (43.7)     Urine (mL/kg/hr) 1615 (1) 1520 (0.9)     Drains 794 805     Other 0 0     Total Output 2409 2325     Net +902.1 +627.5                  Physical Exam  Vitals signs and nursing note reviewed.   Constitutional:       General: She is not in acute distress.     Appearance: Normal appearance.   HENT:      Head: Normocephalic and atraumatic.   Cardiovascular:      Rate and Rhythm: Normal rate and regular rhythm.   Pulmonary:      Effort: Pulmonary effort is normal. No respiratory distress.   Abdominal:      General: There is no distension.      Palpations: Abdomen is soft.      Tenderness: There is no abdominal tenderness.      Comments: Incision c/d/i  G tube to gravity, minimal drainage on drain sponge  Abdominal binder in place   Skin:     General: Skin is warm and dry.   Neurological:      General: No focal deficit present.      Mental Status: She is alert and oriented to person, place, and time.   Psychiatric:         Mood and Affect: Mood normal.         Behavior: Behavior normal.         Significant Labs:  CBC:   Recent Labs   Lab 09/23/20  0343   WBC 10.58   RBC 2.68*   HGB 7.5*   HCT 24.4*      MCV 91   MCH 28.0   MCHC 30.7*     CMP:   Recent Labs   Lab 09/23/20  0343      CALCIUM 8.3*   ALBUMIN 1.1*   PROT 5.8*      K 3.4*   CO2 18*   *   BUN 38*   CREATININE 0.9   ALKPHOS 184*   ALT 25   AST 33   BILITOT 3.1*       Significant Diagnostics:  I have reviewed all pertinent imaging results/findings within the past 24 hours.

## 2020-09-23 NOTE — PROGRESS NOTES
[facility]  Critical Care - Surgery  Progress Note    Patient Name: Elda Hernandez  MRN: [id number]  Admission Date: 9/3/2020  Hospital Length of Stay: 20 days  Code Status: Full Code  Attending Provider: Clark Rodriguez MD  Primary Care Provider: Jordana Woods MD   Principal Problem: Perforated abdominal viscus    Subjective:     Hospital/ICU Course:  9/4: Pt admitted to SICU following ex lap for GI perf. Intubated, sedated. Wound vac in place.  Soon after arrival to unit a mottled appearance to RUE was noted.  Vascular consulted and US revealed R radial artery thrombosis.    9/5: Tachycardic..  R arm appearance greatly improved overnight.    9/6: Patient returned to OR for abdominal washout, closure. R arm with dopplerable signals to ulnar artery and palmar arch.   9/7: Attempted to wean patient off of sedation in an effort to move towards extubation. Patient weaned off of propofol. Precedex started. Patient's heart rate very labile and sensitize to sedation.   9/8: patient extubated  9/9: SHANICE. Labetalol PRN for HTN   9/10:  Diuresis increased.  Cr downtrending  9/11: Increased abdominal distension and elevated WBC count. CT abd  9/12: WBC to 45 today. Diflucan and zosyn initiated. Lactulose enema w/ improving mental status  9/13: WBC improved. Mental status improving. FWF initiated for hypernatremia.   9/14: White count continues to improve.  Oriented to person only this morning.    9/17: tolerating tube feeds, half TPN rate today. Possible step down   9/18: possible fall out of bed overnight. High G tube residual, now to gravity. Requiring comfortflo  9/19: s/p exlap. Intubated ans sedated. Weaning to extubate  9/20: No acute events overnight. Pt remains intubated, sedated. Plan to wean sedation for extubation.  9/21: Abdominal cultures grew Klebsiella; pt on zosyn  9/22: Pt did well with SBT yesterday; back on rate overnight.  Will try again today with hopes to extubate  9/23:  Extubated yesterday; doing well with nasal canula    Interval History/Significant Events: NAEO.  Pt doing well this morning.  Oriented to person and place this morning.  Denies pain and any new complaints.     Follow-up For: Procedure(s) (LRB):  LAPAROTOMY, EXPLORATORY, DRAINAGE OF ABSCESS, REPAIR OF GASTRIC PERFORATION, GASTROSTOMY TUBE PLACEMENT (N/A)  APPLICATION, WOUND VAC (N/A)    Post-Operative Day: 5 Days Post-Op    Objective:     Vital Signs (Most Recent):  Temp: 98.6 °F (37 °C) (09/23/20 0300)  Pulse: (!) 123 (09/23/20 0608)  Resp: (!) 36 (09/23/20 0608)  BP: 131/62 (09/23/20 0600)  SpO2: 97 % (09/23/20 0608) Vital Signs (24h Range):  Temp:  [98.3 °F (36.8 °C)-98.9 °F (37.2 °C)] 98.6 °F (37 °C)  Pulse:  [] 123  Resp:  [15-48] 36  SpO2:  [90 %-100 %] 97 %  BP: ()/(49-71) 131/62     Weight: 67.6 kg (149 lb)  Body mass index is 30.09 kg/m².      Intake/Output Summary (Last 24 hours) at 9/23/2020 0800  Last data filed at 9/23/2020 0600  Gross per 24 hour   Intake 2932.49 ml   Output 2178 ml   Net 754.49 ml       Physical Exam  Vitals signs and nursing note reviewed.   Constitutional:       General: She is not in acute distress.  HENT:      Head: Normocephalic and atraumatic.      Nose:      Comments: NG tube in place     Mouth/Throat:      Comments: Hoarse voice  Eyes:      Extraocular Movements: Extraocular movements intact.      Pupils: Pupils are equal, round, and reactive to light.   Neck:      Comments: RIJ central line  Cardiovascular:      Rate and Rhythm: Normal rate and regular rhythm.   Pulmonary:      Effort: Pulmonary effort is normal.      Breath sounds: Rhonchi (bilateral) present.   Abdominal:      General: There is no distension.      Palpations: Abdomen is soft.      Comments: Midline incision with wound vac in place; excellent seal to wound vac.   L abd drains in place with serous output; G tube with serous output;      Genitourinary:     Comments: L groin with improving  ecchymosis  Bhardwaj in place  Skin:     General: Skin is warm and dry.   Neurological:      Mental Status: She is alert.         Vents:  Vent Mode: A/C (09/22/20 0759)  Ventilator Initiated: Yes (09/18/20 1648)  Set Rate: 16 BPM (09/22/20 0759)  Vt Set: 350 mL (09/22/20 0759)  Pressure Support: 5 cmH20 (09/22/20 0443)  PEEP/CPAP: 5 cmH20 (09/22/20 0759)  Oxygen Concentration (%): 44 (09/23/20 0608)  Peak Airway Pressure: 21 cmH2O (09/22/20 0759)  Plateau Pressure: 19 cmH20 (09/22/20 0759)  Total Ve: 10.9 mL (09/22/20 0759)  Negative Inspiratory Force (cm H2O): -31 (09/22/20 1021)  F/VT Ratio<105 (RSBI): (!) 54.92 (09/22/20 0759)    Lines/Drains/Airways     Central Venous Catheter Line            Percutaneous Central Line Insertion/Assessment - Triple Lumen  09/04/20 0800 right internal jugular 19 days          Drain                 Urethral Catheter 09/14/20 1330 8 days         Closed/Suction Drain 09/18/20 1555 Left Abdomen Bulb 19 Fr. 4 days         Closed/Suction Drain 09/18/20 1559 Left Abdomen Bulb 19 Fr. 4 days         Gastrostomy/Enterostomy 09/18/20 1612 Other (see comments) LUQ decompression;feeding 4 days         NG/OG Tube 09/18/20 1500 Right nostril 4 days          Peripheral Intravenous Line                 Peripheral IV - Single Lumen 09/20/20 1017 22 G Posterior;Right Hand 2 days                Significant Labs:    CBC/Anemia Profile:  Recent Labs   Lab 09/22/20  0323 09/23/20  0343   WBC 7.21 10.58   HGB 7.2* 7.5*   HCT 23.5* 24.4*   * 184   MCV 92 91   RDW 16.7* 16.7*        Chemistries:  Recent Labs   Lab 09/22/20  0324 09/23/20  0343    144   K 3.4* 3.4*   * 117*   CO2 19* 18*   BUN 43* 38*   CREATININE 1.0 0.9   CALCIUM 8.1* 8.3*   ALBUMIN 1.2* 1.1*   PROT 5.5* 5.8*   BILITOT 2.9* 3.1*   ALKPHOS 190* 184*   ALT 28 25   AST 26 33   MG 1.7 1.8   PHOS 2.4* 2.4*       All pertinent labs within the past 24 hours have been reviewed.    Significant Imaging:  I have reviewed all  pertinent imaging results/findings within the past 24 hours.    Assessment/Plan:     * Perforated abdominal viscus  Neuro:  -sedation: none  -analgesia: PRN oxy. Dilaudid  - haldol q6hr PRN for agitation     CV:   - HDS off of pressors  - continue to monitor    Pulm:   - Extubated 9/22  - Satting well on 6L NC currently   - Duonebs Q6 PRN  - Worsening ronchi this AM; CXR ordered.  80 IV lasix; limit IVF today     FEN/GI: Perforated stomach; s/p washout and patch (9/4); abdominal closure (9/6)  - s/p ex-lap on 9/19   - continue to hold tube feeds at this time  - NG tube, G tube, and L abdominal drains in place with serous output  - G tube upsized  - Lactulose discontinued (9/14) in setting of normal ammonia level (9/10) and improving mental status  - Daily metabolic panel with PRN repletion of electrolytes  - pantoprazole for ulcer ppx  - Cont TPN     Renal:  - Continue to monitor with daily metabolic labs  - nephrology on board, appreciate recs  - Cr 0.9 today; continues to improve  - Bhardwaj in place; monitoring I/Os     HEME/ID:   - Febrile to 101.2 yesterday (9/22) AM; no recurrence since  - H/H stable    - Daily CBC  - s/p Liver transplant in 2002; Daily tacrolimus level with AM labs. Per hepatology, continue tacrolimus 1mg BID  - acetaminophen PRN  - Diflucan and Zosyn  - heparin for DVT ppx  - Abdominal culture grew Klebsiella pneumonia; sensitive to zosyn    Endo:  - Synthroid 40mcg daily  - insulin aspart for blood glucose control    Dispo:  - Continue SICU care; PT/OT consulted         John Spivey MD  Critical Care - Surgery  [facility]

## 2020-09-23 NOTE — PLAN OF CARE
SW is following this Pt for DC planning needs. Discharge Disposition: SNF - pending patient progress (OSNF following) - at risk for reintubation. SW awaiting patient oriented to review discharge plan of SNF and safety.     SW will continue to coordinate with patient, family, team and insurance to complete patient's discharge plan.    Tigist Moody LMSW   - Case Management

## 2020-09-23 NOTE — PT/OT/SLP RE-EVAL
Physical Therapy Re-evaluation and Treatment     Patient Name:  Elda Hernandez   MRN:  [id number]    *co-treatment with OT   Recommendations:     Discharge Recommendations:  nursing facility, skilled   Discharge Equipment Recommendations: (TBD)   Barriers to discharge: Decreased caregiver support    Assessment:     Elda Hernandez is a 50 y.o. female admitted with a medical diagnosis of Perforated abdominal viscus.  She presents with the following impairments/functional limitations:  weakness, impaired endurance, impaired self care skills, impaired functional mobilty, gait instability, impaired balance, impaired cardiopulmonary response to activity. Pt tolerated activity with minimum assistance required to maintain sitting balance and EOB. Pt demo'd tachypnea and delayed response to commands with increased time required. Upon discharge, pt would benefit from continued skilled therapy intervention at skilled nursing facility to progress toward more independent mobility. At this time, pt would continue to benefit from acute skilled therapy intervention to address deficits and progress toward prior level of function.       Rehab Prognosis:  good; patient would benefit from acute skilled PT services to address these deficits and reach maximum level of function.      Recent Surgery: Procedure(s) (LRB):  LAPAROTOMY, EXPLORATORY, DRAINAGE OF ABSCESS, REPAIR OF GASTRIC PERFORATION, GASTROSTOMY TUBE PLACEMENT (N/A)  APPLICATION, WOUND VAC (N/A) 5 Days Post-Op    Plan:     During this hospitalization, patient to be seen 3 x/week to address the above listed problems via gait training, therapeutic activities, therapeutic exercises, neuromuscular re-education  · Plan of Care Expires:  10/23/20   Plan of Care Reviewed with: patient    Subjective     Communicated with RN prior to session.  Patient found HOB elevated with blood pressure cuff, pulse ox (continuous), telemetry, central line, PEG Tube, NG tube, mei catheter,  peripheral IV upon PT entry to room, agreeable to evaluation.      Chief Complaint: Pt did not vocalize complaints   Patient comments/goals: to get better and return home   Pain/Comfort:  · Pain Rating 1: 0/10  · Pain Rating Post-Intervention 1: 0/10    Patients cultural, spiritual, Anglican conflicts given the current situation: no      Objective:     Patient found with: blood pressure cuff, pulse ox (continuous), telemetry, central line, PEG Tube, NG tube, mei catheter, peripheral IV     General Precautions: Standard, fall   Orthopedic Precautions:N/A   Braces: N/A     Exams:  · Cognitive Exam:  Patient is AAOx4, followed all commands, communicates clearly and fluently  · Gross Motor Coordination:  WFL  · RLE ROM: WFL  · RLE Strength: grossly 3/5  · LLE ROM: WFL  · LLE Strength: grossly 3/5     Functional Mobility:  · Bed Mobility:     · Supine to Sit: maximal assistance  · Sit to Supine: total assistance    AM-PAC 6 CLICK MOBILITY  Total Score:8       Therapeutic Activities and Exercises:   Pt sat EOB for 10 mins with excessive posterior lean that increased with increased fatigue. Session focused on strength and stabilization of trunk musculature and tolerance to sitting EOB. Pt participated in self care activity with OT. Pt returned to supine 2/2 fatigue.   Pt educated on role of PT/POC. Pt verbalized understanding.   Pt encouraged to only perform OOB mobility with assistance from nursing/therapy. Pt agreeable.   Pt encouraged to perform bed level exercise including AROM with UE and LE.   Joint/muscle restriction noted in B ankles. PRAFO ordered to don to feet, alternating feet every 2 hours. RN notified.     Patient left HOB elevated with all lines intact, call button in reach and RN notified.    GOALS:   Multidisciplinary Problems     Physical Therapy Goals        Problem: Physical Therapy Goal    Goal Priority Disciplines Outcome Goal Variances Interventions   Physical Therapy Goal     PT, PT/OT Ongoing,  Progressing     Description: Goals to be met by: 10/7/2020    Patient will increase functional independence with mobility by performin. Supine to sit with MInimal Assistance - not met   2. Sit to supine with MInimal Assistance - not met   3. Sit to stand transfer with Minimal Assistance  4. Bed to chair transfer with Moderate Assistance using Rolling Walker or no AD.    5. Lower extremity exercise program x30 reps per handout, with independence                     History:     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    Arnold-Chiari malformation     Depression     Esophageal stricture     Essential tremor     Hypertension     Left bundle branch block     Liver fibrosis, transplanted liver 10/2/2018    Suggested on fibroscan 10/2/18    Migraine without aura     MVP (mitral valve prolapse)     Non-rheumatic mitral regurgitation 10/1/2018    Non-rheumatic tricuspid valve insufficiency 10/1/2018    Osteoporosis     Recurrent urinary tract infection     Seizures     Shingles     SIADH (syndrome of inappropriate ADH production)     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    Tricuspid valve prolapse     Urolithiasis     Von Gierke disease     s/p liver transplant       Past Surgical History:   Procedure Laterality Date    APPENDECTOMY  2007    APPLICATION OF WOUND VACUUM-ASSISTED CLOSURE DEVICE N/A 2020    Procedure: APPLICATION, WOUND VAC;  Surgeon: Zain Decker MD;  Location: [facility] OR [address] [place] [address];  Service: General;  Laterality: N/A;    COLONOSCOPY  2008    internal hemorrhoids    CRANIOTOMY      ESOPHAGOGASTRODUODENOSCOPY N/A 9/3/2020    Procedure: EGD (ESOPHAGOGASTRODUODENOSCOPY);  Surgeon: Tyrel Vergara MD;  Location: [facility];  Service: Endoscopy;  Laterality: N/A;    LAMINECTOMY  3/2001    LIVER TRANSPLANT  2002    OSSICULAR RECONSTRUCTION  10/4/1995    RIGHT REPLACEMENT PROSTHESIS for cholesteatoma     THYMECTOMY  5/2/2007    TONSILLECTOMY, ADENOIDECTOMY  1/21/2004    TOTAL ABDOMINAL HYSTERECTOMY  3/31/1994       Time Tracking:     PT Received On: 09/23/20  PT Start Time: 0923     PT Stop Time: 0943  PT Total Time (min): 20 min     Billable Minutes: Re-eval 10 mins  and Therapeutic Exercise 10 mins       Criss Lange, PT  09/23/2020

## 2020-09-23 NOTE — PT/OT/SLP RE-EVAL
Occupational Therapy   Re-evaluation and Treatment     Name: Elda Hernandez  MRN: [id number]  Admitting Diagnosis:  Perforated abdominal viscus 5 Days Post-Op    Recommendations:     Discharge Recommendations: nursing facility, skilled  Discharge Equipment Recommendations:  other (see comments)(TBD)  Barriers to discharge:  Other (Comment)(Pt requires increased assistance at current functional level)    Assessment:     Elda Hernandez is a 50 y.o. female with a medical diagnosis of Perforated abdominal viscus.  She presents with performance deficits affecting function are weakness, impaired endurance, impaired self care skills, impaired functional mobilty, gait instability, impaired balance, impaired cognition, decreased coordination, decreased upper extremity function, decreased lower extremity function, impaired cardiopulmonary response to activity, decreased safety awareness.  Pt tolerated session well this date but requires increased assistance at current functional level. Pt required max A x 2 person for bed mobility and EOB sitting balance min A for 10 mins. Pt noted to be tachypneic while sitting EOB. Pt would benefit from continued skilled acute OT services in order to maximize independence and safety with ADLs and functional mobility to ensure safe return to PLOF in the least restrictive environment. OT recommending SNF placement once pt is medically appropriate for d/c.     Rehab Prognosis:  Good ; patient would benefit from acute skilled OT services to address these deficits and reach maximum level of function.       Plan:     Patient to be seen 3 x/week to address the above listed problems via self-care/home management, therapeutic activities, therapeutic exercises  · Plan of Care Expires: 10/22/20  · Plan of Care Reviewed with: patient    Subjective     Chief Complaint: impaired mobility   Patient/Family stated goals: to get better    Pain/Comfort:  · Pain Rating 1: 0/10  · Pain Rating  Post-Intervention 1: 0/10    Objective:     Communicated with: RN prior to session.  Patient found HOB elevated with: blood pressure cuff, pulse ox (continuous), telemetry, central line, oxygen, peripheral IV, mei catheter, NG tube, pressure relief boots, SCD, PEG Tube upon OT entry to room. Co-treat with PT.     General Precautions: Standard, fall   Orthopedic Precautions:N/A   Braces: N/A     Occupational Performance:    Bed Mobility:    · Patient completed Scooting/Bridging with total assistance, 2 persons and for supine scooting towards HOB and max A for anterior scooting towards EOB   · Patient completed Supine to Sit with maximal assistance and 2 persons  · Patient completed Sit to Supine with maximal assistance and 2 persons    Functional Mobility/Transfers:  · Not performed due to increased fatigue while sitting EOB     Activities of Daily Living:  · Grooming: minimum assistance pt was able to reach for wash cloth using R UE while sitting EOB and was able to wash face using RUE. Max A for using yankauer suction.    · Lower Body Dressing: total assistance donning B  socks     Cognitive/Visual Perceptual:  Cognitive/Psychosocial Skills:     -       Oriented to: Person, Place and Time   -       Follows Commands/attention:Follows two-step commands  -       Communication: low tone, mouthing words  -       Memory: No Deficits noted  -       Safety awareness/insight to disability: intact   -       Mood/Affect/Coping skills/emotional control: Appropriate to situation and Flat affect  Visual/Perceptual:      -Intact      Physical Exam:  Balance:  Pt tolerated sitting EOB for 10 mins    Static sit: min A increased posterior lean as pt fatigued    Dynamic sit: min A   Static standing: not performed due to increased fatigue and weakness     Postural examination/scapula alignment:    -       Rounded shoulders  -       Forward head  Skin integrity: Visible skin intact  Edema:  Mild B UEs  Sensation:    -        Intact for B UEs  Dominant hand:    -       right   Upper Extremity Range of Motion:     -       Right Upper Extremity: WFL with AAROM and PROM  -       Left Upper Extremity: WFL with AAROM and PROM  Upper Extremity Strength:    -       Right Upper Extremity: Deficits: grossly 3/5   -       Left Upper Extremity: Deficits: grossly 3/5    Strength:    -       Right Upper Extremity: 3-/5   -       Left Upper Extremity: 3-/5     AMPAC 6 Click:  AMPAC Total Score: 6    Treatment & Education:   Education:   Pt educated on role of OT, POC, and goals for therapy.     Re-eval completed 9/23/2020   POC was dicussed with patient/caregiver, who was included in its development and is in agreement with the identified goals and treatment plan.    Time provided for therapeutic counseling and discussion of health disposition.    Educated on importance of EOB/OOB mobility, maintaining routine, sitting up in chair, and maximizing independence with ADLs during admission    Pt completed ADLs and functional mobility for treatment session as noted above    Pt/caregiver verbalized understanding and expressed no further concerns/questions.   Updated communication board with level of assist required (max A x 2 person assistance for supine<>sit) & educated RN/patient that pt is appropriate for medichair transfers with RN/PCT.       Patient left HOB elevated with all lines intact, call button in reach and RN notified    GOALS:   Multidisciplinary Problems     Occupational Therapy Goals        Problem: Occupational Therapy Goal    Goal Priority Disciplines Outcome Interventions   Occupational Therapy Goal     OT, PT/OT Ongoing, Progressing    Description: Goals to be met by: 10/7/20     Patient will increase functional independence with ADLs by performing:    UE Dressing with min A  LE Dressing with min A  Grooming while sitting EOB with min A  Toileting from [facility]  with min A for hygiene and clothing management.   Toilet transfer to  BSC with min A  Pt will tolerate sitting EOB with VSS with CGA while engaging in functional reaching tasks                      History:     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    Arnold-Chiari malformation     Depression     Esophageal stricture     Essential tremor     Hypertension     Left bundle branch block     Liver fibrosis, transplanted liver 10/2/2018    Suggested on fibroscan 10/2/18    Migraine without aura     MVP (mitral valve prolapse)     Non-rheumatic mitral regurgitation 10/1/2018    Non-rheumatic tricuspid valve insufficiency 10/1/2018    Osteoporosis     Recurrent urinary tract infection     Seizures     Shingles 2007    SIADH (syndrome of inappropriate ADH production)     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    Tricuspid valve prolapse     Urolithiasis     Von Gierke disease     s/p liver transplant       Past Surgical History:   Procedure Laterality Date    APPENDECTOMY  6/22/2007    APPLICATION OF WOUND VACUUM-ASSISTED CLOSURE DEVICE N/A 9/18/2020    Procedure: APPLICATION, WOUND VAC;  Surgeon: Zain Decker MD;  Location: [facility] OR [address] [place] [address];  Service: General;  Laterality: N/A;    COLONOSCOPY  5/13/2008    internal hemorrhoids    CRANIOTOMY      ESOPHAGOGASTRODUODENOSCOPY N/A 9/3/2020    Procedure: EGD (ESOPHAGOGASTRODUODENOSCOPY);  Surgeon: Tyrel Vergara MD;  Location: [facility];  Service: Endoscopy;  Laterality: N/A;    LAMINECTOMY  3/2001    LIVER TRANSPLANT  9/23/2002    OSSICULAR RECONSTRUCTION  10/4/1995    RIGHT REPLACEMENT PROSTHESIS for cholesteatoma    THYMECTOMY  5/2/2007    TONSILLECTOMY, ADENOIDECTOMY  1/21/2004    TOTAL ABDOMINAL HYSTERECTOMY  3/31/1994       Time Tracking:     OT Date of Treatment: 09/23/20  OT Start Time: 0923  OT Stop Time: 0943  OT Total Time (min): 20 min (Co-treat with PT)    Billable Minutes:Re-eval 10  Therapeutic Activity 10    Shelby M Mensi, OT  9/23/2020

## 2020-09-23 NOTE — PROGRESS NOTES
[facility]  General Surgery  Progress Note    Subjective:     History of Present Illness:  Ms. Hernandez is a 49yo F w/PMH of von Gierke disease s/p liver transplant (2002, on tacro + MMF, follows Dr. Nielsen), hx chronic rejection (bx 2015 with acute and chronic rejection s/p steroids), biliary stricture and ductopenic rejection, recently with cirrhosis on fibroscan (10/2019), HTN, and depression who presents as a transfer for further evaluation of gastric outlet obstruction and esophageal stricturing.  Patient decompensated requiring multiple pressors and intubation. CT scan showed free air. Prior to intubation patient reported severe abdominal pain.   states that patient has had epigastric pain, nausea, and vomiting for several days.     Post-Op Info:  Procedure(s) (LRB):  LAPAROTOMY, EXPLORATORY, DRAINAGE OF ABSCESS, REPAIR OF GASTRIC PERFORATION, GASTROSTOMY TUBE PLACEMENT (N/A)  APPLICATION, WOUND VAC (N/A)   5 Days Post-Op     Interval History: NAEON. On 6L high flow for desaturation episodes overnight.     Medications:  Continuous Infusions:   dexmedetomidine (PRECEDEX) infusion Stopped (09/22/20 2100)    TPN ADULT CENTRAL LINE CUSTOM 60 mL/hr at 09/23/20 0500     Scheduled Meds:   alteplase  2 mg Intra-Catheter Once    fat emulsion 20%  250 mL Intravenous Daily    fentaNYL  50 mcg Intravenous Once    fluconazole (DIFLUCAN) IVPB  200 mg Intravenous Q24H    furosemide (LASIX) IV  80 mg Intravenous Once    heparin (porcine)  5,000 Units Subcutaneous Q8H    olanzapine zydis  10 mg Sublingual QHS    pantoprazole  40 mg Intravenous Daily    piperacillin-tazobactam (ZOSYN) IVPB  4.5 g Intravenous Q8H    tacrolimus  0.5 mg Sublingual BID     PRN Meds:sodium chloride, acetaminophen, albuterol-ipratropium, calcium gluconate IVPB, calcium gluconate IVPB, calcium gluconate IVPB, dextrose 50%, dextrose 50%, glucagon (human recombinant), glucose, glucose, haloperidol lactate, insulin  aspart U-100, labetaloL, magnesium sulfate IVPB, magnesium sulfate IVPB, ondansetron, potassium chloride in water **AND** potassium chloride in water **AND** potassium chloride in water, promethazine, sodium chloride 0.9%, sodium chloride 0.9%, sodium phosphate IVPB, sodium phosphate IVPB, sodium phosphate IVPB     Review of patient's allergies indicates:   Allergen Reactions    Codeine Itching     Other reaction(s): Itching    Lipitor [atorvastatin] Other (See Comments)     Other reaction(s): Muscle pain  Muscle cranmps    Morphine Itching     Other reaction(s): nausea and vomiting     Zoloft [sertraline] Other (See Comments)     Tremors/muscle spasms     Objective:     Vital Signs (Most Recent):  Temp: 98.6 °F (37 °C) (09/23/20 0300)  Pulse: (!) 123 (09/23/20 0608)  Resp: (!) 36 (09/23/20 0608)  BP: 131/62 (09/23/20 0600)  SpO2: 97 % (09/23/20 0608) Vital Signs (24h Range):  Temp:  [98.3 °F (36.8 °C)-98.9 °F (37.2 °C)] 98.6 °F (37 °C)  Pulse:  [] 123  Resp:  [15-48] 36  SpO2:  [90 %-100 %] 97 %  BP: ()/(49-71) 131/62     Weight: 67.6 kg (149 lb)  Body mass index is 30.09 kg/m².    Intake/Output - Last 3 Shifts       09/21 0700 - 09/22 0659 09/22 0700 - 09/23 0659 09/23 0700 - 09/24 0659    I.V. (mL/kg) 2126.1 (31.5) 1538 (22.8)     NG/GT  70     IV Piggyback       TPN 1185 1344.5     Total Intake(mL/kg) 3311.1 (49) 2952.5 (43.7)     Urine (mL/kg/hr) 1615 (1) 1520 (0.9)     Drains 794 805     Other 0 0     Total Output 2409 2325     Net +902.1 +627.5                  Physical Exam  Vitals signs and nursing note reviewed.   Constitutional:       General: She is not in acute distress.     Appearance: Normal appearance.   HENT:      Head: Normocephalic and atraumatic.   Cardiovascular:      Rate and Rhythm: Normal rate and regular rhythm.   Pulmonary:      Effort: Pulmonary effort is normal. No respiratory distress.   Abdominal:      General: There is no distension.      Palpations: Abdomen is soft.       Tenderness: There is no abdominal tenderness.      Comments: Incision c/d/i  G tube to gravity, minimal drainage on drain sponge  Abdominal binder in place   Skin:     General: Skin is warm and dry.   Neurological:      General: No focal deficit present.      Mental Status: She is alert and oriented to person, place, and time.   Psychiatric:         Mood and Affect: Mood normal.         Behavior: Behavior normal.         Significant Labs:  CBC:   Recent Labs   Lab 09/23/20  0343   WBC 10.58   RBC 2.68*   HGB 7.5*   HCT 24.4*      MCV 91   MCH 28.0   MCHC 30.7*     CMP:   Recent Labs   Lab 09/23/20  0343      CALCIUM 8.3*   ALBUMIN 1.1*   PROT 5.8*      K 3.4*   CO2 18*   *   BUN 38*   CREATININE 0.9   ALKPHOS 184*   ALT 25   AST 33   BILITOT 3.1*       Significant Diagnostics:  I have reviewed all pertinent imaging results/findings within the past 24 hours.    Assessment/Plan:     * Perforated abdominal viscus  50 y.o. female w/ free air on CT scan. S/p emergent ex lap on 9/4 w/ findings of gastric perforation, primary repair. Now w/  abdominal closure on 9/6, skin stapled closed, KERRY drains removed. Class A to OR on 9/18 for free air and extrav or oral contrast seen on CT     Continue SICU care, appreciate their assistance  G tube and NG to gravity/suction  Wean oxygen as tolerated        Rocio Hidalgo MD  General Surgery  [facility]

## 2020-09-23 NOTE — PLAN OF CARE
Problem: Occupational Therapy Goal  Goal: Occupational Therapy Goal  Description: Goals to be met by: 10/7/20     Patient will increase functional independence with ADLs by performing:    UE Dressing with min A  LE Dressing with min A  Grooming while sitting EOB with min A  Toileting from BSC  with min A for hygiene and clothing management.   Toilet transfer to BS with min A  Pt will tolerate sitting EOB with VSS with CGA while engaging in functional reaching tasks     Outcome: Ongoing, Progressing   Re-eval completed 9/23/2020  Goals revised and updated     Shelby Sloan OTR/L  Pager: [phone]  9/23/2020

## 2020-09-23 NOTE — PLAN OF CARE
SICU PLAN OF CARE    Dx: gastric outlet obstruction    Shift Events: O2 Desat to 85% around 0530 this am, placed on 6L HFNC to obtain sats >95%, NT suction.     Goals of Care: Wean oxygen as tolerated, optimize comfort     Vital Signs:  Temp: 98.6 °F (37 °C) (09/23/20 0300)  Pulse: (!) 123 (09/23/20 0608)  Resp: (!) 36 (09/23/20 0608)  BP: 131/62 (09/23/20 0600)  SpO2: 97 % (09/23/20 0608)      Assessment: AAO x person, place. Follows commands, QUIROS. Lung sounds very coarse, fluid overloaded. MD aware.     Diet: NPO/TPN     Gtts: TPN, Lipids     Urine Output: 45-90 cc's/hr     Drains: G tube 200 cc's, KERRY #2: 125 cc's, KRERY #1 4 cc's     Skin:  Q2 turns, triad applied to sacrum and catheter care performed, immerse mattress in use, heel boots in place.

## 2020-09-23 NOTE — PLAN OF CARE
Problem: Physical Therapy Goal  Goal: Physical Therapy Goal  Description: Goals to be met by: 10/7/2020    Patient will increase functional independence with mobility by performin. Supine to sit with MInimal Assistance - not met   2. Sit to supine with MInimal Assistance - not met   3. Sit to stand transfer with Minimal Assistance  4. Bed to chair transfer with Moderate Assistance using Rolling Walker or no AD.    5. Lower extremity exercise program x30 reps per handout, with independence    Outcome: Ongoing, Progressing     Pt re-evaluated and appropriate goals established.     Criss Lange, PT, DPT  2020  [phone]

## 2020-09-23 NOTE — ASSESSMENT & PLAN NOTE
Neuro:  -sedation: none  -analgesia: PRN oxy. Dilaudid  - haldol q6hr PRN for agitation     CV:   - HDS off of pressors  - continue to monitor    Pulm:   - Extubated 9/22  - Satting well on 6L NC currently   - Duonebs Q6 PRN  - Worsening ronchi this AM; CXR ordered.  80 IV lasix     FEN/GI: Perforated stomach; s/p washout and patch (9/4); abdominal closure (9/6)  - s/p ex-lap on 9/19   - continue to hold tube feeds at this time  - NG tube, G tube, and L abdominal drains in place with serous output  - G tube upsized  - Lactulose discontinued (9/14) in setting of normal ammonia level (9/10) and improving mental status  - Daily metabolic panel with PRN repletion of electrolytes  - pantoprazole for ulcer ppx  - Cont TPN     Renal:  - Continue to monitor with daily metabolic labs  - nephrology on board, appreciate recs  - Cr 0.9 today; continues to improve  - Bhardwaj in place; monitoring I/Os     HEME/ID:   - Febrile to 101.2 yesterday (9/22) AM; no recurrence since  - H/H stable    - Daily CBC  - s/p Liver transplant in 2002; Daily tacrolimus level with AM labs. Per hepatology, continue tacrolimus 1mg BID  - acetaminophen PRN  - Diflucan and Zosyn  - heparin for DVT ppx  - Abdominal culture grew Klebsiella pneumonia; sensitive to zosyn    Endo:  - Synthroid 40mcg daily  - insulin aspart for blood glucose control    Dispo:  - Continue SICU care

## 2020-09-23 NOTE — PROGRESS NOTES
Pt afebrile, sats >95% on 3L HFNC. MAP >65 SBP < 140. -130's. TPN gtts currently infusing. 160 mg lasix given, -350cc/hr. Accuchecks q4h, 2 units insulin coverage required on shift. Electrolytes replaced in AM. POC reviewed with pt and , no questions at this time. Will continue to monitor.

## 2020-09-23 NOTE — ASSESSMENT & PLAN NOTE
50 y.o. female w/ free air on CT scan. S/p emergent ex lap on 9/4 w/ findings of gastric perforation, primary repair. Now w/  abdominal closure on 9/6, skin stapled closed, KERRY drains removed. Class A to OR on 9/18 for free air and extrav or oral contrast seen on CT     Continue SICU care, appreciate their assistance  G tube and NG to gravity/suction  Wean oxygen as tolerated

## 2020-09-24 LAB
ALBUMIN SERPL BCP-MCNC: 1.2 G/DL (ref 3.5–5.2)
ALP SERPL-CCNC: 190 U/L (ref 55–135)
ALT SERPL W/O P-5'-P-CCNC: 24 U/L (ref 10–44)
ANION GAP SERPL CALC-SCNC: 12 MMOL/L (ref 8–16)
ANISOCYTOSIS BLD QL SMEAR: SLIGHT
AST SERPL-CCNC: 28 U/L (ref 10–40)
BACTERIA SPEC ANAEROBE CULT: ABNORMAL
BASO STIPL BLD QL SMEAR: ABNORMAL
BASOPHILS # BLD AUTO: 0.05 K/UL (ref 0–0.2)
BASOPHILS NFR BLD: 0.4 % (ref 0–1.9)
BILIRUB SERPL-MCNC: 2.8 MG/DL (ref 0.1–1)
BUN SERPL-MCNC: 40 MG/DL (ref 6–20)
CALCIUM SERPL-MCNC: 8.1 MG/DL (ref 8.7–10.5)
CHLORIDE SERPL-SCNC: 113 MMOL/L (ref 95–110)
CO2 SERPL-SCNC: 20 MMOL/L (ref 23–29)
CREAT SERPL-MCNC: 1.1 MG/DL (ref 0.5–1.4)
DIFFERENTIAL METHOD: ABNORMAL
EOSINOPHIL # BLD AUTO: 0.3 K/UL (ref 0–0.5)
EOSINOPHIL NFR BLD: 2.3 % (ref 0–8)
ERYTHROCYTE [DISTWIDTH] IN BLOOD BY AUTOMATED COUNT: 16.6 % (ref 11.5–14.5)
EST. GFR  (AFRICAN AMERICAN): >60 ML/MIN/1.73 M^2
EST. GFR  (NON AFRICAN AMERICAN): 57.9 ML/MIN/1.73 M^2
GLUCOSE SERPL-MCNC: 106 MG/DL (ref 70–110)
HCT VFR BLD AUTO: 24.7 % (ref 37–48.5)
HGB BLD-MCNC: 7.7 G/DL (ref 12–16)
IMM GRANULOCYTES # BLD AUTO: 0.5 K/UL (ref 0–0.04)
IMM GRANULOCYTES NFR BLD AUTO: 3.7 % (ref 0–0.5)
LYMPHOCYTES # BLD AUTO: 1.4 K/UL (ref 1–4.8)
LYMPHOCYTES NFR BLD: 10.7 % (ref 18–48)
MAGNESIUM SERPL-MCNC: 2 MG/DL (ref 1.6–2.6)
MCH RBC QN AUTO: 28.1 PG (ref 27–31)
MCHC RBC AUTO-ENTMCNC: 31.2 G/DL (ref 32–36)
MCV RBC AUTO: 90 FL (ref 82–98)
MONOCYTES # BLD AUTO: 1.3 K/UL (ref 0.3–1)
MONOCYTES NFR BLD: 9.9 % (ref 4–15)
NEUTROPHILS # BLD AUTO: 9.8 K/UL (ref 1.8–7.7)
NEUTROPHILS NFR BLD: 73 % (ref 38–73)
NRBC BLD-RTO: 0 /100 WBC
PHOSPHATE SERPL-MCNC: 3.9 MG/DL (ref 2.7–4.5)
PLATELET # BLD AUTO: 195 K/UL (ref 150–350)
PLATELET BLD QL SMEAR: ABNORMAL
PMV BLD AUTO: 12.1 FL (ref 9.2–12.9)
POCT GLUCOSE: 117 MG/DL (ref 70–110)
POCT GLUCOSE: 120 MG/DL (ref 70–110)
POCT GLUCOSE: 122 MG/DL (ref 70–110)
POCT GLUCOSE: 130 MG/DL (ref 70–110)
POCT GLUCOSE: 136 MG/DL (ref 70–110)
POCT GLUCOSE: 140 MG/DL (ref 70–110)
POTASSIUM SERPL-SCNC: 3.5 MMOL/L (ref 3.5–5.1)
PROT SERPL-MCNC: 6.1 G/DL (ref 6–8.4)
RBC # BLD AUTO: 2.74 M/UL (ref 4–5.4)
SODIUM SERPL-SCNC: 145 MMOL/L (ref 136–145)
TACROLIMUS BLD-MCNC: 2.2 NG/ML (ref 5–15)
TOXIC GRANULES BLD QL SMEAR: PRESENT
WBC # BLD AUTO: 13.4 K/UL (ref 3.9–12.7)

## 2020-09-24 PROCEDURE — A4217 STERILE WATER/SALINE, 500 ML: HCPCS | Performed by: STUDENT IN AN ORGANIZED HEALTH CARE EDUCATION/TRAINING PROGRAM

## 2020-09-24 PROCEDURE — 25000003 PHARM REV CODE 250: Performed by: STUDENT IN AN ORGANIZED HEALTH CARE EDUCATION/TRAINING PROGRAM

## 2020-09-24 PROCEDURE — 31720 CLEARANCE OF AIRWAYS: CPT

## 2020-09-24 PROCEDURE — 63600175 PHARM REV CODE 636 W HCPCS: Performed by: STUDENT IN AN ORGANIZED HEALTH CARE EDUCATION/TRAINING PROGRAM

## 2020-09-24 PROCEDURE — S0030 INJECTION, METRONIDAZOLE: HCPCS | Performed by: STUDENT IN AN ORGANIZED HEALTH CARE EDUCATION/TRAINING PROGRAM

## 2020-09-24 PROCEDURE — 80197 ASSAY OF TACROLIMUS: CPT

## 2020-09-24 PROCEDURE — 97607 NEG PRS WND THR NDME<=50SQCM: CPT

## 2020-09-24 PROCEDURE — 20000000 HC ICU ROOM

## 2020-09-24 PROCEDURE — 80053 COMPREHEN METABOLIC PANEL: CPT

## 2020-09-24 PROCEDURE — 94668 MNPJ CHEST WALL SBSQ: CPT

## 2020-09-24 PROCEDURE — B4185 PARENTERAL SOL 10 GM LIPIDS: HCPCS | Performed by: STUDENT IN AN ORGANIZED HEALTH CARE EDUCATION/TRAINING PROGRAM

## 2020-09-24 PROCEDURE — 94761 N-INVAS EAR/PLS OXIMETRY MLT: CPT

## 2020-09-24 PROCEDURE — 63600175 PHARM REV CODE 636 W HCPCS: Performed by: SURGERY

## 2020-09-24 PROCEDURE — 27000221 HC OXYGEN, UP TO 24 HOURS

## 2020-09-24 PROCEDURE — 99233 SBSQ HOSP IP/OBS HIGH 50: CPT | Mod: 24,,, | Performed by: SURGERY

## 2020-09-24 PROCEDURE — 85025 COMPLETE CBC W/AUTO DIFF WBC: CPT

## 2020-09-24 PROCEDURE — 97803 MED NUTRITION INDIV SUBSEQ: CPT

## 2020-09-24 PROCEDURE — 84100 ASSAY OF PHOSPHORUS: CPT

## 2020-09-24 PROCEDURE — 83735 ASSAY OF MAGNESIUM: CPT

## 2020-09-24 PROCEDURE — C9113 INJ PANTOPRAZOLE SODIUM, VIA: HCPCS | Performed by: STUDENT IN AN ORGANIZED HEALTH CARE EDUCATION/TRAINING PROGRAM

## 2020-09-24 PROCEDURE — 25000003 PHARM REV CODE 250: Performed by: INTERNAL MEDICINE

## 2020-09-24 PROCEDURE — 99233 PR SUBSEQUENT HOSPITAL CARE,LEVL III: ICD-10-PCS | Mod: 24,,, | Performed by: SURGERY

## 2020-09-24 RX ORDER — HYDROMORPHONE HYDROCHLORIDE 1 MG/ML
0.5 INJECTION, SOLUTION INTRAMUSCULAR; INTRAVENOUS; SUBCUTANEOUS EVERY 4 HOURS PRN
Status: DISCONTINUED | OUTPATIENT
Start: 2020-09-24 | End: 2020-10-06

## 2020-09-24 RX ORDER — FUROSEMIDE 10 MG/ML
40 INJECTION INTRAMUSCULAR; INTRAVENOUS
Status: DISCONTINUED | OUTPATIENT
Start: 2020-09-24 | End: 2020-09-26

## 2020-09-24 RX ADMIN — HYDROMORPHONE HYDROCHLORIDE 0.5 MG: 1 INJECTION, SOLUTION INTRAMUSCULAR; INTRAVENOUS; SUBCUTANEOUS at 10:09

## 2020-09-24 RX ADMIN — METRONIDAZOLE 500 MG: 500 INJECTION, SOLUTION INTRAVENOUS at 05:09

## 2020-09-24 RX ADMIN — CEFTRIAXONE 2 G: 2 INJECTION, SOLUTION INTRAVENOUS at 10:09

## 2020-09-24 RX ADMIN — TACROLIMUS 0.5 MG: 0.5 CAPSULE ORAL at 05:09

## 2020-09-24 RX ADMIN — TACROLIMUS 0.5 MG: 0.5 CAPSULE ORAL at 09:09

## 2020-09-24 RX ADMIN — I.V. FAT EMULSION 250 ML: 20 EMULSION INTRAVENOUS at 09:09

## 2020-09-24 RX ADMIN — METRONIDAZOLE 500 MG: 500 INJECTION, SOLUTION INTRAVENOUS at 01:09

## 2020-09-24 RX ADMIN — PANTOPRAZOLE SODIUM 40 MG: 40 INJECTION, POWDER, LYOPHILIZED, FOR SOLUTION INTRAVENOUS at 09:09

## 2020-09-24 RX ADMIN — HEPARIN SODIUM 5000 UNITS: 5000 INJECTION INTRAVENOUS; SUBCUTANEOUS at 09:09

## 2020-09-24 RX ADMIN — LEVOTHYROXINE SODIUM ANHYDROUS 40 MCG: 100 INJECTION, POWDER, LYOPHILIZED, FOR SOLUTION INTRAVENOUS at 09:09

## 2020-09-24 RX ADMIN — METRONIDAZOLE 500 MG: 500 INJECTION, SOLUTION INTRAVENOUS at 10:09

## 2020-09-24 RX ADMIN — FUROSEMIDE 40 MG: 10 INJECTION, SOLUTION INTRAMUSCULAR; INTRAVENOUS at 08:09

## 2020-09-24 RX ADMIN — POTASSIUM CHLORIDE 40 MEQ: 29.8 INJECTION, SOLUTION INTRAVENOUS at 04:09

## 2020-09-24 RX ADMIN — FUROSEMIDE 40 MG: 10 INJECTION, SOLUTION INTRAMUSCULAR; INTRAVENOUS at 09:09

## 2020-09-24 RX ADMIN — MAGNESIUM SULFATE HEPTAHYDRATE: 500 INJECTION, SOLUTION INTRAMUSCULAR; INTRAVENOUS at 09:09

## 2020-09-24 RX ADMIN — HEPARIN SODIUM 5000 UNITS: 5000 INJECTION INTRAVENOUS; SUBCUTANEOUS at 05:09

## 2020-09-24 RX ADMIN — HEPARIN SODIUM 5000 UNITS: 5000 INJECTION INTRAVENOUS; SUBCUTANEOUS at 03:09

## 2020-09-24 RX ADMIN — OLANZAPINE 10 MG: 5 TABLET, ORALLY DISINTEGRATING ORAL at 08:09

## 2020-09-24 NOTE — PROGRESS NOTES
[facility]  General Surgery  Progress Note    Subjective:     History of Present Illness:  Ms. Hernandez is a 51yo F w/PMH of von Gierke disease s/p liver transplant (2002, on tacro + MMF, follows Dr. Nielsen), hx chronic rejection (bx 2015 with acute and chronic rejection s/p steroids), biliary stricture and ductopenic rejection, recently with cirrhosis on fibroscan (10/2019), HTN, and depression who presents as a transfer for further evaluation of gastric outlet obstruction and esophageal stricturing.  Patient decompensated requiring multiple pressors and intubation. CT scan showed free air. Prior to intubation patient reported severe abdominal pain.   states that patient has had epigastric pain, nausea, and vomiting for several days.     Post-Op Info:  Procedure(s) (LRB):  LAPAROTOMY, EXPLORATORY, DRAINAGE OF ABSCESS, REPAIR OF GASTRIC PERFORATION, GASTROSTOMY TUBE PLACEMENT (N/A)  APPLICATION, WOUND VAC (N/A)   6 Days Post-Op     Interval History: NAEON. Weaned to 3L high flow. Mental status improving    Medications:  Continuous Infusions:   TPN ADULT CENTRAL LINE CUSTOM 60 mL/hr at 09/24/20 0800     Scheduled Meds:   cefTRIAXone (ROCEPHIN) IVPB  2 g Intravenous Q24H    fat emulsion 20%  250 mL Intravenous Daily    furosemide (LASIX) IV  40 mg Intravenous Q12H    heparin (porcine)  5,000 Units Subcutaneous Q8H    levothyroxine  40 mcg Intravenous Daily    metronidazole  500 mg Intravenous Q8H    olanzapine zydis  10 mg Sublingual QHS    pantoprazole  40 mg Intravenous Daily    tacrolimus  0.5 mg Sublingual BID     PRN Meds:sodium chloride, acetaminophen, albuterol-ipratropium, calcium gluconate IVPB, calcium gluconate IVPB, calcium gluconate IVPB, dextrose 50%, dextrose 50%, glucagon (human recombinant), glucose, glucose, haloperidol lactate, insulin aspart U-100, labetaloL, magnesium sulfate IVPB, magnesium sulfate IVPB, ondansetron, potassium chloride in water **AND**  potassium chloride in water **AND** potassium chloride in water, promethazine, sodium chloride 0.9%, sodium chloride 0.9%, sodium phosphate IVPB, sodium phosphate IVPB, sodium phosphate IVPB     Review of patient's allergies indicates:   Allergen Reactions    Codeine Itching     Other reaction(s): Itching    Lipitor [atorvastatin] Other (See Comments)     Other reaction(s): Muscle pain  Muscle cranmps    Morphine Itching     Other reaction(s): nausea and vomiting     Zoloft [sertraline] Other (See Comments)     Tremors/muscle spasms     Objective:     Vital Signs (Most Recent):  Temp: 100 °F (37.8 °C) (09/24/20 0700)  Pulse: (!) 114 (09/24/20 0835)  Resp: (!) 32 (09/24/20 0835)  BP: (!) 148/70 (09/24/20 0830)  SpO2: 97 % (09/24/20 0835) Vital Signs (24h Range):  Temp:  [98.4 °F (36.9 °C)-100 °F (37.8 °C)] 100 °F (37.8 °C)  Pulse:  [114-127] 114  Resp:  [31-45] 32  SpO2:  [92 %-100 %] 97 %  BP: (110-180)/(56-93) 148/70     Weight: 67.6 kg (149 lb)  Body mass index is 30.09 kg/m².    Intake/Output - Last 3 Shifts       09/22 0700 - 09/23 0659 09/23 0700 - 09/24 0659 09/24 0700 - 09/25 0659    I.V. (mL/kg) 1538 (22.8) 1079 (16)     NG/GT 70      TPN 1344.5 1683     Total Intake(mL/kg) 2952.5 (43.7) 2762 (40.9)     Urine (mL/kg/hr) 1520 (0.9) 4990 (3.1) 125 (0.9)    Drains 805 570 100    Other 0 45 0    Total Output 2325 5605 225    Net +627.5 -2843 -225                 Physical Exam  Vitals signs and nursing note reviewed.   Constitutional:       General: She is not in acute distress.     Appearance: Normal appearance.   HENT:      Head: Normocephalic and atraumatic.   Cardiovascular:      Rate and Rhythm: Normal rate and regular rhythm.   Pulmonary:      Effort: Pulmonary effort is normal. No respiratory distress.   Abdominal:      General: There is no distension.      Palpations: Abdomen is soft.      Tenderness: There is no abdominal tenderness.      Comments: Incision c/d/i  G tube to gravity, minimal drainage  on drain sponge  Abdominal binder in place   Skin:     General: Skin is warm and dry.   Neurological:      General: No focal deficit present.      Mental Status: She is alert and oriented to person, place, and time.   Psychiatric:         Mood and Affect: Mood normal.         Behavior: Behavior normal.         Significant Labs:  CBC:   Recent Labs   Lab 09/24/20  0300   WBC 13.40*   RBC 2.74*   HGB 7.7*   HCT 24.7*      MCV 90   MCH 28.1   MCHC 31.2*     CMP:   Recent Labs   Lab 09/24/20  0300      CALCIUM 8.1*   ALBUMIN 1.2*   PROT 6.1      K 3.5   CO2 20*   *   BUN 40*   CREATININE 1.1   ALKPHOS 190*   ALT 24   AST 28   BILITOT 2.8*       Significant Diagnostics:  I have reviewed all pertinent imaging results/findings within the past 24 hours.    Assessment/Plan:     * Perforated abdominal viscus  50 y.o. female w/ free air on CT scan. S/p emergent ex lap on 9/4 w/ findings of gastric perforation, primary repair. Now w/  abdominal closure on 9/6, skin stapled closed, KERRY drains removed. Class A to OR on 9/18 for free air and extrav or oral contrast seen on CT     Continue SICU care, appreciate their assistance  G tube and NG to gravity/suction  Wean oxygen as tolerated  Wound vac needs changing today. Will consult wound care      Rocio Hidalgo MD  General Surgery  [facility]

## 2020-09-24 NOTE — PLAN OF CARE
Pt free from falls and injury this shift. All VSS at this time. Pt with no complaints of pain this shift, remains nonverbal but follows commands and nods appropriately. Sats >95% on 3L NC. MAPs maintained >65 with no issues, pt only on TPN continuously. Abdomen rounded/soft, abdominal binder in place. Abdominal incision staples intact, wound vac exchanged this AM per wound care. NG tube to LIS with no output this shfit. KERRY drains with 175cc output total this shift, PEG tube remains to gravity with no output. Pt mei remains intact, uop adequate. No other significant events this shift. Plan of care reviewed with pt and , all questions answered. Will continue to monitor closely.

## 2020-09-24 NOTE — PROGRESS NOTES
[facility]  Adult Nutrition  Progress Note    SUMMARY       Recommendations     1.) Continue custom TPN: 100g dextrose, 70g AA , standard lipids to provide 1620 kcal, 70g pro, GIR 1.24.     2.) Should transition to EN be desired, initiate TF with Impact Peptide at 10ml/hr and increase by 10ml/hr Q4 hrs to interim goal 25ml/hr (provides 900 kcal, 56g pro, 462ml free water).   -When pt is tolerating this EN rate, on Day One decrease TPN by half to 50g dextrose, 35g AA.  - On day Two, discontinue TPN and advance EN with Impact Peptide 1.5 to goal rate 45ml/hr (provides 1620 kcal, 102g pro, 832ml free water). Additional 100ml water flush Q6hrs or per MD. H    Goals: 1. Pt's intake to meet % EEN and EPN by RD follow up.  Nutrition Goal Status: progressing towards goal  Communication of RD Recs: (POC)    Reason for Assessment    Reason For Assessment: RD follow-up  Diagnosis: (gastric outlet obstruction)  Relevant Medical History: Esophageal stricture; HTN; Liver fibrosis; Seizures; SCC; HTN; S/p liver tx  Interdisciplinary Rounds: did not attend  General Information Comments: Pt s/p exlap for GI perforation continues on TPN. Pt s/p PEG 9/18 but had high residuals when feeding started, not currently on TF, with 150ml output from PEG and 50ml output from NGT. Pt on TPN since admission, extubated for 2nd time 9/23. Pt with stage 1 PI to L upper leg and several wounds noted to R arm and buttocks. Per NFPE 9/06, pt with moderate malnutrition r/t wt loss and muscle wasting. Repeated NFPE today 9/24: pt continues with muscle wasting. Pt appears alert but unable to communicate.  Nutrition Discharge Planning: unable to determine    Nutrition Risk Screen    Nutrition Risk Screen: tube feeding or parenteral nutrition    Nutrition/Diet History    Spiritual, Cultural Beliefs, Hinduism Practices, Values that Affect Care: no  Food Allergies: NKFA  Factors Affecting Nutritional Intake: altered gastrointestinal  "function, NPO    Anthropometrics    Temp: (!) 100.9 °F (38.3 °C)  Height Method: Stated  Height: 4' 11" (149.9 cm)  Height (inches): 59 in  Weight Method: Bed Scale  Weight: 67.6 kg (149 lb)  Weight (lb): 149 lb  Ideal Body Weight (IBW), Female: 95 lb  % Ideal Body Weight, Female (lb): 156.84 %  BMI (Calculated): 30.1  BMI Grade: 25 - 29.9 - overweight       Lab/Procedures/Meds    Pertinent Labs Reviewed: reviewed  Pertinent Labs Comments: Cl 113, BUN 40, Alk phos 190, Alb 1.2  Pertinent Medications Reviewed: reviewed  Pertinent Medications Comments: fentanyl, pantoprazole, tacrolimus    Estimated/Assessed Needs    Weight Used For Calorie Calculations: 58 kg (127 lb 13.9 oz)  Energy Calorie Requirements (kcal): 1740 kcal  Energy Need Method: Kcal/kg  Protein Requirements: 70-87g  Weight Used For Protein Calculations: 58 kg (127 lb 13.9 oz)  Fluid Requirements (mL): 1 mL/kcal or per MD     RDA Method (mL): 1740       Nutrition Prescription Ordered    Current Diet Order: NPO (9/4)  Nutrition Order Comments: (none)  Current Nutrition Support Formula Ordered: (Custom TPN)  Current Nutrition Support Rate Ordered: 0 (ml)  Current Nutrition Support Frequency Ordered: mL/hr    Evaluation of Received Nutrient/Fluid Intake    Enteral Calories (kcal): 0  Enteral Protein (gm): 0  Enteral (Free Water) Fluid (mL): 0  Parenteral Calories (kcal): 1120  Parenteral Protein (gm): 70  Parenteral Fluid (mL): 1210  Lipid Calories (kcals): 500 kcals  GIR (Glucose Infusion Rate) (mg/kg/min): 1.24 mg/kg/min  Other Calories (kcal): 0  Total Calories (kcal): 1620  Total Calories (kcal/kg): (NA)  % Kcal Needs: 93  Total Protein (gm): 70  Total Protein (gm/kg): (NA)  % Protein Needs: 100  I/O: +23.5L since admission  Energy Calories Required: meeting needs  Protein Required: meeting needs  Fluid Required: meeting needs  Comments: LBM 9/17  Tolerance: tolerating  % Intake of Estimated Energy Needs: 75 - 100 %  % Meal Intake: NPO    Nutrition " Risk    Level of Risk/Frequency of Follow-up: high     Assessment and Plan    Moderate Protein-Calorie Malnutrition  Malnutrition in the context of Acute Illness/Injury     Related to (etiology):  Decreased intake      Signs and Symptoms (as evidenced by):  Energy Intake: <50% of estimated energy requirement for >3 weeks   Muscle Mass Depletion: mild depletion of temples, moderate depletion of clavicles   Weight Loss: 11% x 3 weeks per family   Fluid Accumulation: mild     Interventions(treatment strategy):  Collaboration of care with other providers  Parenteral Nutrition    Nutrition Diagnosis Status:  continues    Monitor and Evaluation    Food and Nutrient Intake: energy intake, food and beverage intake, enteral nutrition intake  Food and Nutrient Adminstration: enteral and parenteral nutrition administration, diet order  Knowledge/Beliefs/Attitudes: food and nutrition knowledge/skill  Anthropometric Measurements: weight, weight change  Biochemical Data, Medical Tests and Procedures: electrolyte and renal panel, gastrointestinal profile, glucose/endocrine profile, inflammatory profile, lipid profile  Nutrition-Focused Physical Findings: overall appearance     Malnutrition Assessment  Malnutrition Type: acute illness or injury          Weight Loss (Malnutrition): (11% x 3 weeks)  Energy Intake (Malnutrition): less than or equal to 50% for greater than or equal to 1 month   Orbital Region (Subcutaneous Fat Loss): mild depletion  Upper Arm Region (Subcutaneous Fat Loss): well nourished  Thoracic and Lumbar Region: well nourished   [place] Region (Muscle Loss): moderate depletion  Clavicle Bone Region (Muscle Loss): mild depletion  Clavicle and Acromion Bone Region (Muscle Loss): well nourished  Patellar Region (Muscle Loss): well nourished  Anterior Thigh Region (Muscle Loss): well nourished  Posterior Calf Region (Muscle Loss): well nourished   Edema (Fluid Accumulation): 1-->mild(lower extremities)              Nutrition Follow-Up    RD Follow-up?: Yes

## 2020-09-24 NOTE — ASSESSMENT & PLAN NOTE
Neuro:  - sedation: none  - analgesia: PRN oxy. Fentanyl PRN  - Tylenol  - haldol q6hr PRN for agitation     CV:   - HDS off of pressors  - continue to monitor    Pulm:   - Extubated 9/22  - Satting well on 3L NC currently   - Duonebs Q6 PRN  - Improving ronchi this AM; CXR ordered.  40 IV lasix BID     FEN/GI: Perforated stomach; s/p washout and patch (9/4); abdominal closure (9/6)  - s/p ex-lap on 9/19   - continue to hold tube feeds at this time  - NG tube, G tube, and L abdominal drains in place with serous output  - G tube upsized  - Lactulose discontinued (9/14) in setting of normal ammonia level (9/10) and improving mental status  - Daily metabolic panel with PRN repletion of electrolytes  - pantoprazole for ulcer ppx  - Cont TPN     Renal:  - Continue to monitor with daily metabolic labs  - nephrology on board, appreciate recs  - Bun/Cr 40/1.1 today; continues to improve  - UOP 4810mL yesterday. NET -2663  - Bhardwaj in place; monitoring I/Os  - Lasix 40 BID today     HEME/ID:   - Afebrile  - H/H stable. 7.7/24.7 today    - Daily CBC  - s/p Liver transplant in 2002; Daily tacrolimus level with AM labs. Per hepatology, continue tacrolimus 1mg BID  - acetaminophen PRN  - Metronidazole and Ceftriaxone  - heparin for DVT ppx  - Abdominal culture grew Klebsiella pneumonia; sensitive to zosyn    Endo:  - Synthroid 40mcg daily  - insulin aspart for blood glucose control    Dispo:  - Continue SICU care

## 2020-09-24 NOTE — SUBJECTIVE & OBJECTIVE
Interval History: NAEON. Continues on 3L high flow. Patient awake but non verbal and not following commands.     Medications:  Continuous Infusions:   TPN ADULT CENTRAL LINE CUSTOM 60 mL/hr at 09/24/20 0800     Scheduled Meds:   cefTRIAXone (ROCEPHIN) IVPB  2 g Intravenous Q24H    fat emulsion 20%  250 mL Intravenous Daily    furosemide (LASIX) IV  40 mg Intravenous Q12H    heparin (porcine)  5,000 Units Subcutaneous Q8H    levothyroxine  40 mcg Intravenous Daily    metronidazole  500 mg Intravenous Q8H    olanzapine zydis  10 mg Sublingual QHS    pantoprazole  40 mg Intravenous Daily    tacrolimus  0.5 mg Sublingual BID     PRN Meds:sodium chloride, acetaminophen, albuterol-ipratropium, calcium gluconate IVPB, calcium gluconate IVPB, calcium gluconate IVPB, dextrose 50%, dextrose 50%, glucagon (human recombinant), glucose, glucose, haloperidol lactate, insulin aspart U-100, labetaloL, magnesium sulfate IVPB, magnesium sulfate IVPB, ondansetron, potassium chloride in water **AND** potassium chloride in water **AND** potassium chloride in water, promethazine, sodium chloride 0.9%, sodium chloride 0.9%, sodium phosphate IVPB, sodium phosphate IVPB, sodium phosphate IVPB     Review of patient's allergies indicates:   Allergen Reactions    Codeine Itching     Other reaction(s): Itching    Lipitor [atorvastatin] Other (See Comments)     Other reaction(s): Muscle pain  Muscle cranmps    Morphine Itching     Other reaction(s): nausea and vomiting     Zoloft [sertraline] Other (See Comments)     Tremors/muscle spasms     Objective:     Vital Signs (Most Recent):  Temp: 100 °F (37.8 °C) (09/24/20 0700)  Pulse: (!) 114 (09/24/20 0835)  Resp: (!) 32 (09/24/20 0835)  BP: (!) 148/70 (09/24/20 0830)  SpO2: 97 % (09/24/20 0835) Vital Signs (24h Range):  Temp:  [98.4 °F (36.9 °C)-100 °F (37.8 °C)] 100 °F (37.8 °C)  Pulse:  [114-127] 114  Resp:  [31-45] 32  SpO2:  [92 %-100 %] 97 %  BP: (110-180)/(56-93) 148/70      Weight: 67.6 kg (149 lb)  Body mass index is 30.09 kg/m².    Intake/Output - Last 3 Shifts       09/22 0700 - 09/23 0659 09/23 0700 - 09/24 0659 09/24 0700 - 09/25 0659    I.V. (mL/kg) 1538 (22.8) 1079 (16)     NG/GT 70      TPN 1344.5 1683     Total Intake(mL/kg) 2952.5 (43.7) 2762 (40.9)     Urine (mL/kg/hr) 1520 (0.9) 4990 (3.1) 125 (0.9)    Drains 805 570 100    Other 0 45 0    Total Output 2325 5605 225    Net +627.5 -2843 -225                 Physical Exam  Vitals signs and nursing note reviewed.   Constitutional:       General: She is not in acute distress.     Appearance: Normal appearance.   HENT:      Head: Normocephalic and atraumatic.   Cardiovascular:      Rate and Rhythm: Normal rate and regular rhythm.   Pulmonary:      Effort: Pulmonary effort is normal. No respiratory distress.   Abdominal:      General: There is no distension.      Palpations: Abdomen is soft.      Tenderness: There is no abdominal tenderness.      Comments: Incision c/d/i  G tube to gravity, minimal drainage on drain sponge  Abdominal binder in place   Skin:     General: Skin is warm and dry.   Neurological:      General: No focal deficit present.      Mental Status: She is alert and oriented to person, place, and time.   Psychiatric:         Mood and Affect: Mood normal.         Behavior: Behavior normal.         Significant Labs:  CBC:   Recent Labs   Lab 09/24/20  0300   WBC 13.40*   RBC 2.74*   HGB 7.7*   HCT 24.7*      MCV 90   MCH 28.1   MCHC 31.2*     CMP:   Recent Labs   Lab 09/24/20  0300      CALCIUM 8.1*   ALBUMIN 1.2*   PROT 6.1      K 3.5   CO2 20*   *   BUN 40*   CREATININE 1.1   ALKPHOS 190*   ALT 24   AST 28   BILITOT 2.8*       Significant Diagnostics:  I have reviewed all pertinent imaging results/findings within the past 24 hours.

## 2020-09-24 NOTE — PLAN OF CARE
09/24/20 0908   Discharge Reassessment   Assessment Type Discharge Planning Reassessment   Discharge plan remains the same: Yes   Provided patient/caregiver education on the expected discharge date and the discharge plan No   Do you have any problems affording any of your prescribed medications? No   Discharge Plan A Skilled Nursing Facility   Discharge Plan B Home Health   DME Needed Upon Discharge  other (see comments)  (TBD)   Anticipated Discharge Disposition SNF   Post-Acute Status   Post-Acute Placement Status Awaiting Internal Medical Clearance       Susana Pringle MPH, RN, CM  Ext. [phone]

## 2020-09-24 NOTE — PLAN OF CARE
"      SICU PLAN OF CARE NOTE    Dx: Perforated abdominal viscus    Shift Events: No acute events this shift. Afebrile, VSS. TPN @ 60 cc.hr. Sinus Tachycardia 120-125 bpm. SpO2 maintained >90 %. No complaints of pain. Plan of care reviewed with patient. Will continue to monitor.     Goals of Care: MAP>65, SBP<180    Neuro: Follows Commands and Moves All Extremities    Vital Signs: BP (!) 154/56   Pulse (!) 119   Temp 99.2 °F (37.3 °C) (Oral)   Resp (!) 33   Ht 4' 11" (1.499 m)   Wt 67.6 kg (149 lb)   SpO2 95%   BMI 30.09 kg/m²     Respiratory: Nasal Cannula    Diet: NPO and TPN    Urine Output: Urinary Catheter 2060 cc/shift    Drains: KERRY Drain, total output 105 cc / shift, G-tube/J-tube, total output 150 cc /  shift, and NG/OG Tube 50 cc /  shift    Wound Vac 45     Labs/Accuchecks: Accuchecks Q 4 hr. All labs trended. See flowsheets.    Skin: Bed plugged in, properly working, and set for patient weight. Turned patient Q2 hr. Foam applied to heels and sacrum. Abdominal incision CDI, no drainage. Wound care administered per orders. See flowsheets.        "

## 2020-09-24 NOTE — SUBJECTIVE & OBJECTIVE
Interval History/Significant Events:   - No acute events overnight  - AFVSS  - Remains on 3L NC O2  - UOP 4800mL yesterday after lasix. NET - 2600mL  - Mental status improving, but still questionable as to if she is at her baseline.    Follow-up For: Procedure(s) (LRB):  LAPAROTOMY, EXPLORATORY, DRAINAGE OF ABSCESS, REPAIR OF GASTRIC PERFORATION, GASTROSTOMY TUBE PLACEMENT (N/A)  APPLICATION, WOUND VAC (N/A)    Post-Operative Day: 6 Days Post-Op    Objective:     Vital Signs (Most Recent):  Temp: 100 °F (37.8 °C) (09/24/20 0700)  Pulse: (!) 114 (09/24/20 0835)  Resp: (!) 32 (09/24/20 0835)  BP: (!) 148/70 (09/24/20 0830)  SpO2: 97 % (09/24/20 0835) Vital Signs (24h Range):  Temp:  [98.4 °F (36.9 °C)-100 °F (37.8 °C)] 100 °F (37.8 °C)  Pulse:  [114-127] 114  Resp:  [31-45] 32  SpO2:  [92 %-100 %] 97 %  BP: (110-180)/(56-93) 148/70     Weight: 67.6 kg (149 lb)  Body mass index is 30.09 kg/m².      Intake/Output Summary (Last 24 hours) at 9/24/2020 0842  Last data filed at 9/24/2020 0800  Gross per 24 hour   Intake 2762 ml   Output 5655 ml   Net -2893 ml       Physical Exam  Vitals signs and nursing note reviewed.   Constitutional:       General: She is not in acute distress.  HENT:      Head: Normocephalic and atraumatic.      Nose:      Comments: NG tube in place     Mouth/Throat:      Comments: Hoarse voice  Eyes:      General: No scleral icterus.     Extraocular Movements: Extraocular movements intact.      Pupils: Pupils are equal, round, and reactive to light.   Neck:      Comments: RIJ central line  Cardiovascular:      Rate and Rhythm: Regular rhythm. Tachycardia present.   Pulmonary:      Effort: Pulmonary effort is normal. No respiratory distress.      Breath sounds: Rhonchi: bilateral.   Abdominal:      General: There is no distension.      Palpations: Abdomen is soft.      Comments: Midline incision with wound vac in place; excellent seal to wound vac.   L abd drains in place with serous output; G tube with  serous output;      Genitourinary:     Comments: L groin with improving ecchymosis  Bhardwaj in place  Skin:     General: Skin is warm and dry.   Neurological:      General: No focal deficit present.      Mental Status: She is alert.           Lines/Drains/Airways     Central Venous Catheter Line            Percutaneous Central Line Insertion/Assessment - Triple Lumen  09/04/20 0800 right internal jugular 20 days          Drain                 Urethral Catheter 09/14/20 1330 9 days         Closed/Suction Drain 09/18/20 1555 Left Abdomen Bulb 19 Fr. 5 days         Closed/Suction Drain 09/18/20 1559 Left Abdomen Bulb 19 Fr. 5 days         Gastrostomy/Enterostomy 09/18/20 1612 Other (see comments) LUQ decompression;feeding 5 days         NG/OG Tube 09/18/20 1500 Right nostril 5 days          Peripheral Intravenous Line                 Peripheral IV - Single Lumen 09/20/20 1017 22 G Posterior;Right Hand 3 days                Significant Labs:    CBC/Anemia Profile:  Recent Labs   Lab 09/23/20  0343 09/24/20  0300   WBC 10.58 13.40*   HGB 7.5* 7.7*   HCT 24.4* 24.7*    195   MCV 91 90   RDW 16.7* 16.6*        Chemistries:  Recent Labs   Lab 09/23/20  0343 09/23/20  1200 09/24/20  0300    144 145   K 3.4* 4.2 3.5   * 115* 113*   CO2 18* 19* 20*   BUN 38* 40* 40*   CREATININE 0.9 1.0 1.1   CALCIUM 8.3* 8.2* 8.1*   ALBUMIN 1.1* 1.1* 1.2*   PROT 5.8* 5.9* 6.1   BILITOT 3.1* 2.9* 2.8*   ALKPHOS 184* 192* 190*   ALT 25 25 24   AST 33 27 28   MG 1.8  --  2.0   PHOS 2.4*  --  3.9       All pertinent labs within the past 24 hours have been reviewed.    Significant Imaging:  I have reviewed all pertinent imaging results/findings within the past 24 hours.

## 2020-09-24 NOTE — PLAN OF CARE
Problem: Malnutrition  Goal: Improved Nutritional Intake  Outcome: Ongoing, Progressing   Recommendations      1.) Continue custom TPN: 100g dextrose, 70g AA , standard lipids to provide 1620 kcal, 70g pro, GIR 1.24.      2.) Should transition to EN be desired, initiate TF with Impact Peptide at 10ml/hr and increase by 10ml/hr Q4 hrs to interim goal 25ml/hr (provides 900 kcal, 56g pro, 462ml free water).   -When pt is tolerating this EN rate, on Day One decrease TPN by half to 50g dextrose, 35g AA.  - On day Two, discontinue TPN and advance EN with Impact Peptide 1.5 to goal rate 45ml/hr (provides 1620 kcal, 102g pro, 832ml free water). Additional 100ml water flush Q6hrs or per MD. H     Goals: 1. Pt's intake to meet % EEN and EPN by RD follow up.  Nutrition Goal Status: progressing towards goal  Communication of RD Recs: (POC)

## 2020-09-24 NOTE — CONSULTS
Wound consult received on patient's wound vac change/managment to midline abdomen. Wound vac dressing changed. Cleansed with sterile normal saline, cavilon skin barrier to periwound, packed with one black sponge, seal obtained at 125mmHg. Patient tolerated care well. Wound care to change wound vac twice a week. Wound care to follow prn.    Upper midline abdominal incision: full thickness skin loss  9.5cm x 2cm x 1.5cm  Moist red granular wound bed, scant amount of fibrinous tissue, scant serosanguineous drainage, no odor. Staples noted to lower midline

## 2020-09-24 NOTE — PROGRESS NOTES
[facility]  Critical Care - Surgery  Progress Note    Patient Name: Elda Hernandez  MRN: [id number]  Admission Date: 9/3/2020  Hospital Length of Stay: 21 days  Code Status: Full Code  Attending Provider: Clark Rodriguez MD  Primary Care Provider: Jordana Woods MD   Principal Problem: Perforated abdominal viscus    Subjective:     Hospital/ICU Course:  9/4: Pt admitted to SICU following ex lap for GI perf. Intubated, sedated. Wound vac in place.  Soon after arrival to unit a mottled appearance to RUE was noted.  Vascular consulted and US revealed R radial artery thrombosis.    9/5: Tachycardic..  R arm appearance greatly improved overnight.    9/6: Patient returned to OR for abdominal washout, closure. R arm with dopplerable signals to ulnar artery and palmar arch.   9/7: Attempted to wean patient off of sedation in an effort to move towards extubation. Patient weaned off of propofol. Precedex started. Patient's heart rate very labile and sensitize to sedation.   9/8: patient extubated  9/9: SHANICE. Labetalol PRN for HTN   9/10:  Diuresis increased.  Cr downtrending  9/11: Increased abdominal distension and elevated WBC count. CT abd  9/12: WBC to 45 today. Diflucan and zosyn initiated. Lactulose enema w/ improving mental status  9/13: WBC improved. Mental status improving. FWF initiated for hypernatremia.   9/14: White count continues to improve.  Oriented to person only this morning.    9/17: tolerating tube feeds, half TPN rate today. Possible step down   9/18: possible fall out of bed overnight. High G tube residual, now to gravity. Requiring comfortflo  9/19: s/p exlap. Intubated ans sedated. Weaning to extubate  9/20: No acute events overnight. Pt remains intubated, sedated. Plan to wean sedation for extubation.  9/21: Abdominal cultures grew Klebsiella; pt on zosyn  9/22: Pt did well with SBT yesterday; back on rate overnight.  Will try again today with hopes to extubate  9/23:  Extubated yesterday; doing well with nasal canula  9/24: remains on 3L NC. Diuresed yesterday w/ 240mg Lasix. Continue diuresis today with 40 Lasix BID    Interval History/Significant Events:   - No acute events overnight  - AFVSS  - Remains on 3L NC O2  - UOP 4800mL yesterday after lasix. NET - 2600mL  - Mental status improving, but still questionable as to if she is at her baseline.    Follow-up For: Procedure(s) (LRB):  LAPAROTOMY, EXPLORATORY, DRAINAGE OF ABSCESS, REPAIR OF GASTRIC PERFORATION, GASTROSTOMY TUBE PLACEMENT (N/A)  APPLICATION, WOUND VAC (N/A)    Post-Operative Day: 6 Days Post-Op    Objective:     Vital Signs (Most Recent):  Temp: 100 °F (37.8 °C) (09/24/20 0700)  Pulse: (!) 114 (09/24/20 0835)  Resp: (!) 32 (09/24/20 0835)  BP: (!) 148/70 (09/24/20 0830)  SpO2: 97 % (09/24/20 0835) Vital Signs (24h Range):  Temp:  [98.4 °F (36.9 °C)-100 °F (37.8 °C)] 100 °F (37.8 °C)  Pulse:  [114-127] 114  Resp:  [31-45] 32  SpO2:  [92 %-100 %] 97 %  BP: (110-180)/(56-93) 148/70     Weight: 67.6 kg (149 lb)  Body mass index is 30.09 kg/m².      Intake/Output Summary (Last 24 hours) at 9/24/2020 0842  Last data filed at 9/24/2020 0800  Gross per 24 hour   Intake 2762 ml   Output 5655 ml   Net -2893 ml       Physical Exam  Vitals signs and nursing note reviewed.   Constitutional:       General: She is not in acute distress.  HENT:      Head: Normocephalic and atraumatic.      Nose:      Comments: NG tube in place     Mouth/Throat:      Comments: Hoarse voice  Eyes:      General: No scleral icterus.     Extraocular Movements: Extraocular movements intact.      Pupils: Pupils are equal, round, and reactive to light.   Neck:      Comments: RIJ central line  Cardiovascular:      Rate and Rhythm: Regular rhythm. Tachycardia present.   Pulmonary:      Effort: Pulmonary effort is normal. No respiratory distress.      Breath sounds: Rhonchi: bilateral.   Abdominal:      General: There is no distension.      Palpations:  Abdomen is soft.      Comments: Midline incision with wound vac in place; excellent seal to wound vac.   L abd drains in place with serous output; G tube with serous output;      Genitourinary:     Comments: L groin with improving ecchymosis  Bhardwaj in place  Skin:     General: Skin is warm and dry.   Neurological:      General: No focal deficit present.      Mental Status: She is alert.           Lines/Drains/Airways     Central Venous Catheter Line            Percutaneous Central Line Insertion/Assessment - Triple Lumen  09/04/20 0800 right internal jugular 20 days          Drain                 Urethral Catheter 09/14/20 1330 9 days         Closed/Suction Drain 09/18/20 1555 Left Abdomen Bulb 19 Fr. 5 days         Closed/Suction Drain 09/18/20 1559 Left Abdomen Bulb 19 Fr. 5 days         Gastrostomy/Enterostomy 09/18/20 1612 Other (see comments) LUQ decompression;feeding 5 days         NG/OG Tube 09/18/20 1500 Right nostril 5 days          Peripheral Intravenous Line                 Peripheral IV - Single Lumen 09/20/20 1017 22 G Posterior;Right Hand 3 days                Significant Labs:    CBC/Anemia Profile:  Recent Labs   Lab 09/23/20  0343 09/24/20  0300   WBC 10.58 13.40*   HGB 7.5* 7.7*   HCT 24.4* 24.7*    195   MCV 91 90   RDW 16.7* 16.6*        Chemistries:  Recent Labs   Lab 09/23/20  0343 09/23/20  1200 09/24/20  0300    144 145   K 3.4* 4.2 3.5   * 115* 113*   CO2 18* 19* 20*   BUN 38* 40* 40*   CREATININE 0.9 1.0 1.1   CALCIUM 8.3* 8.2* 8.1*   ALBUMIN 1.1* 1.1* 1.2*   PROT 5.8* 5.9* 6.1   BILITOT 3.1* 2.9* 2.8*   ALKPHOS 184* 192* 190*   ALT 25 25 24   AST 33 27 28   MG 1.8  --  2.0   PHOS 2.4*  --  3.9       All pertinent labs within the past 24 hours have been reviewed.    Significant Imaging:  I have reviewed all pertinent imaging results/findings within the past 24 hours.    Assessment/Plan:     * Perforated abdominal viscus  Neuro:  - sedation: none  - analgesia: PRN oxy.  Fentanyl PRN  - Tylenol  - haldol q6hr PRN for agitation     CV:   - HDS off of pressors  - continue to monitor    Pulm:   - Extubated 9/22  - Satting well on 3L NC currently   - Duonebs Q6 PRN  - Improving ronchi this AM; CXR ordered.  40 IV lasix BID     FEN/GI: Perforated stomach; s/p washout and patch (9/4); abdominal closure (9/6)  - s/p ex-lap on 9/19   - continue to hold tube feeds at this time  - NG tube, G tube, and L abdominal drains in place with serous output  - G tube upsized  - Lactulose discontinued (9/14) in setting of normal ammonia level (9/10) and improving mental status  - Daily metabolic panel with PRN repletion of electrolytes  - pantoprazole for ulcer ppx  - Cont TPN     Renal:  - Continue to monitor with daily metabolic labs  - nephrology on board, appreciate recs  - Bun/Cr 40/1.1 today; continues to improve  - UOP 4810mL yesterday. NET -2663  - Bhardwaj in place; monitoring I/Os  - Lasix 40 BID today     HEME/ID:   - Afebrile  - H/H stable. 7.7/24.7 today    - Daily CBC  - s/p Liver transplant in 2002; Daily tacrolimus level with AM labs. Per hepatology, continue tacrolimus 1mg BID  - acetaminophen PRN  - Metronidazole and Ceftriaxone  - heparin for DVT ppx  - Abdominal culture grew Klebsiella pneumonia; sensitive to zosyn    Endo:  - Synthroid 40mcg daily  - insulin aspart for blood glucose control    Dispo:  - Continue SICU care         Critical care was time spent personally by me on the following activities: development of treatment plan with patient or surrogate and bedside caregivers, discussions with consultants, evaluation of patient's response to treatment, examination of patient, ordering and performing treatments and interventions, ordering and review of laboratory studies, ordering and review of radiographic studies, pulse oximetry, re-evaluation of patient's condition.  This critical care time did not overlap with that of any other provider or involve time for any procedures.      Khoa Damon MD  Critical Care - Surgery  [facility]

## 2020-09-24 NOTE — ASSESSMENT & PLAN NOTE
50 y.o. female w/ free air on CT scan. S/p emergent ex lap on 9/4 w/ findings of gastric perforation, primary repair. Now w/  abdominal closure on 9/6, skin stapled closed, KERRY drains removed. Class A to OR on 9/18 for free air and extrav or oral contrast seen on CT     Continue SICU care, appreciate their assistance  G tube and NG to gravity/suction  Wean oxygen as tolerated  Wound vac changes w/ wound care

## 2020-09-24 NOTE — SUBJECTIVE & OBJECTIVE
Interval History: NAEON. Weaned to 3L high flow. Mental status improving    Medications:  Continuous Infusions:   TPN ADULT CENTRAL LINE CUSTOM 60 mL/hr at 09/24/20 0800     Scheduled Meds:   cefTRIAXone (ROCEPHIN) IVPB  2 g Intravenous Q24H    fat emulsion 20%  250 mL Intravenous Daily    furosemide (LASIX) IV  40 mg Intravenous Q12H    heparin (porcine)  5,000 Units Subcutaneous Q8H    levothyroxine  40 mcg Intravenous Daily    metronidazole  500 mg Intravenous Q8H    olanzapine zydis  10 mg Sublingual QHS    pantoprazole  40 mg Intravenous Daily    tacrolimus  0.5 mg Sublingual BID     PRN Meds:sodium chloride, acetaminophen, albuterol-ipratropium, calcium gluconate IVPB, calcium gluconate IVPB, calcium gluconate IVPB, dextrose 50%, dextrose 50%, glucagon (human recombinant), glucose, glucose, haloperidol lactate, insulin aspart U-100, labetaloL, magnesium sulfate IVPB, magnesium sulfate IVPB, ondansetron, potassium chloride in water **AND** potassium chloride in water **AND** potassium chloride in water, promethazine, sodium chloride 0.9%, sodium chloride 0.9%, sodium phosphate IVPB, sodium phosphate IVPB, sodium phosphate IVPB     Review of patient's allergies indicates:   Allergen Reactions    Codeine Itching     Other reaction(s): Itching    Lipitor [atorvastatin] Other (See Comments)     Other reaction(s): Muscle pain  Muscle cranmps    Morphine Itching     Other reaction(s): nausea and vomiting     Zoloft [sertraline] Other (See Comments)     Tremors/muscle spasms     Objective:     Vital Signs (Most Recent):  Temp: 100 °F (37.8 °C) (09/24/20 0700)  Pulse: (!) 114 (09/24/20 0835)  Resp: (!) 32 (09/24/20 0835)  BP: (!) 148/70 (09/24/20 0830)  SpO2: 97 % (09/24/20 0835) Vital Signs (24h Range):  Temp:  [98.4 °F (36.9 °C)-100 °F (37.8 °C)] 100 °F (37.8 °C)  Pulse:  [114-127] 114  Resp:  [31-45] 32  SpO2:  [92 %-100 %] 97 %  BP: (110-180)/(56-93) 148/70     Weight: 67.6 kg (149 lb)  Body mass  index is 30.09 kg/m².    Intake/Output - Last 3 Shifts       09/22 0700 - 09/23 0659 09/23 0700 - 09/24 0659 09/24 0700 - 09/25 0659    I.V. (mL/kg) 1538 (22.8) 1079 (16)     NG/GT 70      TPN 1344.5 1683     Total Intake(mL/kg) 2952.5 (43.7) 2762 (40.9)     Urine (mL/kg/hr) 1520 (0.9) 4990 (3.1) 125 (1)    Drains 805 570 100    Other 0 45 0    Total Output 2325 5605 225    Net +627.5 -2843 -225                 Physical Exam  Vitals signs and nursing note reviewed.   Constitutional:       General: She is not in acute distress.     Appearance: Normal appearance.   HENT:      Head: Normocephalic and atraumatic.   Cardiovascular:      Rate and Rhythm: Normal rate and regular rhythm.   Pulmonary:      Effort: Pulmonary effort is normal. No respiratory distress.   Abdominal:      General: There is no distension.      Palpations: Abdomen is soft.      Tenderness: There is no abdominal tenderness.      Comments: Incision c/d/i  G tube to gravity, minimal drainage on drain sponge  Abdominal binder in place   Skin:     General: Skin is warm and dry.   Neurological:      General: No focal deficit present.      Mental Status: She is alert and oriented to person, place, and time.   Psychiatric:         Mood and Affect: Mood normal.         Behavior: Behavior normal.         Significant Labs:  CBC:   Recent Labs   Lab 09/24/20  0300   WBC 13.40*   RBC 2.74*   HGB 7.7*   HCT 24.7*      MCV 90   MCH 28.1   MCHC 31.2*     CMP:   Recent Labs   Lab 09/24/20  0300      CALCIUM 8.1*   ALBUMIN 1.2*   PROT 6.1      K 3.5   CO2 20*   *   BUN 40*   CREATININE 1.1   ALKPHOS 190*   ALT 24   AST 28   BILITOT 2.8*       Significant Diagnostics:  I have reviewed all pertinent imaging results/findings within the past 24 hours.

## 2020-09-24 NOTE — PLAN OF CARE
SW met with patient at bedside regarding safety at home and discharge plan of SNF. Patient was non-verbal, minimally responsive nodding her head appropriately occasionally. SW informed patient [facility] is following for placement and that SW would return to bedside at a later time to continue speaking with patient regarding safety and discharge plan.     Tigist Moody LMSW   - Case Management

## 2020-09-25 PROBLEM — H57.02 ANISOCORIA: Status: ACTIVE | Noted: 2020-09-25

## 2020-09-25 LAB
ALBUMIN SERPL BCP-MCNC: 1.2 G/DL (ref 3.5–5.2)
ALP SERPL-CCNC: 208 U/L (ref 55–135)
ALT SERPL W/O P-5'-P-CCNC: 23 U/L (ref 10–44)
ANION GAP SERPL CALC-SCNC: 11 MMOL/L (ref 8–16)
ANISOCYTOSIS BLD QL SMEAR: SLIGHT
AST SERPL-CCNC: 38 U/L (ref 10–40)
BASO STIPL BLD QL SMEAR: ABNORMAL
BASOPHILS # BLD AUTO: ABNORMAL K/UL (ref 0–0.2)
BASOPHILS NFR BLD: 1 % (ref 0–1.9)
BILIRUB SERPL-MCNC: 2.6 MG/DL (ref 0.1–1)
BUN SERPL-MCNC: 48 MG/DL (ref 6–20)
CALCIUM SERPL-MCNC: 8.1 MG/DL (ref 8.7–10.5)
CHLORIDE SERPL-SCNC: 111 MMOL/L (ref 95–110)
CO2 SERPL-SCNC: 21 MMOL/L (ref 23–29)
CREAT SERPL-MCNC: 1.1 MG/DL (ref 0.5–1.4)
DIFFERENTIAL METHOD: ABNORMAL
EOSINOPHIL # BLD AUTO: ABNORMAL K/UL (ref 0–0.5)
EOSINOPHIL NFR BLD: 1 % (ref 0–8)
ERYTHROCYTE [DISTWIDTH] IN BLOOD BY AUTOMATED COUNT: 16.7 % (ref 11.5–14.5)
EST. GFR  (AFRICAN AMERICAN): >60 ML/MIN/1.73 M^2
EST. GFR  (NON AFRICAN AMERICAN): 57.9 ML/MIN/1.73 M^2
GIANT PLATELETS BLD QL SMEAR: PRESENT
GLUCOSE SERPL-MCNC: 114 MG/DL (ref 70–110)
HCT VFR BLD AUTO: 24.6 % (ref 37–48.5)
HGB BLD-MCNC: 7.5 G/DL (ref 12–16)
IMM GRANULOCYTES # BLD AUTO: ABNORMAL K/UL (ref 0–0.04)
IMM GRANULOCYTES NFR BLD AUTO: ABNORMAL % (ref 0–0.5)
LYMPHOCYTES # BLD AUTO: ABNORMAL K/UL (ref 1–4.8)
LYMPHOCYTES NFR BLD: 2.5 % (ref 18–48)
MAGNESIUM SERPL-MCNC: 2.1 MG/DL (ref 1.6–2.6)
MCH RBC QN AUTO: 28.3 PG (ref 27–31)
MCHC RBC AUTO-ENTMCNC: 30.5 G/DL (ref 32–36)
MCV RBC AUTO: 93 FL (ref 82–98)
METAMYELOCYTES NFR BLD MANUAL: 0.5 %
MONOCYTES # BLD AUTO: ABNORMAL K/UL (ref 0.3–1)
MONOCYTES NFR BLD: 3 % (ref 4–15)
MYELOCYTES NFR BLD MANUAL: 2.5 %
NEUTROPHILS NFR BLD: 87 % (ref 38–73)
NEUTS BAND NFR BLD MANUAL: 2.5 %
NRBC BLD-RTO: 1 /100 WBC
PHOSPHATE SERPL-MCNC: 4.5 MG/DL (ref 2.7–4.5)
PLATELET # BLD AUTO: 229 K/UL (ref 150–350)
PLATELET BLD QL SMEAR: ABNORMAL
PMV BLD AUTO: 12.7 FL (ref 9.2–12.9)
POCT GLUCOSE: 119 MG/DL (ref 70–110)
POCT GLUCOSE: 127 MG/DL (ref 70–110)
POCT GLUCOSE: 131 MG/DL (ref 70–110)
POCT GLUCOSE: 151 MG/DL (ref 70–110)
POLYCHROMASIA BLD QL SMEAR: ABNORMAL
POTASSIUM SERPL-SCNC: 3.9 MMOL/L (ref 3.5–5.1)
PROT SERPL-MCNC: 6.4 G/DL (ref 6–8.4)
RBC # BLD AUTO: 2.65 M/UL (ref 4–5.4)
SODIUM SERPL-SCNC: 143 MMOL/L (ref 136–145)
TACROLIMUS BLD-MCNC: 2.1 NG/ML (ref 5–15)
TOXIC GRANULES BLD QL SMEAR: PRESENT
WBC # BLD AUTO: 18.46 K/UL (ref 3.9–12.7)

## 2020-09-25 PROCEDURE — 99900025 HC BRONCHOSCOPY-ASST (STAT)

## 2020-09-25 PROCEDURE — 31500 PR INSERT, EMERGENCY ENDOTRACH AIRWAY: ICD-10-PCS | Mod: 79,,, | Performed by: SURGERY

## 2020-09-25 PROCEDURE — 25000003 PHARM REV CODE 250: Performed by: STUDENT IN AN ORGANIZED HEALTH CARE EDUCATION/TRAINING PROGRAM

## 2020-09-25 PROCEDURE — 85027 COMPLETE CBC AUTOMATED: CPT

## 2020-09-25 PROCEDURE — 83735 ASSAY OF MAGNESIUM: CPT

## 2020-09-25 PROCEDURE — 20000000 HC ICU ROOM

## 2020-09-25 PROCEDURE — A4217 STERILE WATER/SALINE, 500 ML: HCPCS | Performed by: STUDENT IN AN ORGANIZED HEALTH CARE EDUCATION/TRAINING PROGRAM

## 2020-09-25 PROCEDURE — 87205 SMEAR GRAM STAIN: CPT

## 2020-09-25 PROCEDURE — 99900035 HC TECH TIME PER 15 MIN (STAT)

## 2020-09-25 PROCEDURE — 25000003 PHARM REV CODE 250: Performed by: INTERNAL MEDICINE

## 2020-09-25 PROCEDURE — 25000242 PHARM REV CODE 250 ALT 637 W/ HCPCS: Performed by: STUDENT IN AN ORGANIZED HEALTH CARE EDUCATION/TRAINING PROGRAM

## 2020-09-25 PROCEDURE — 27000221 HC OXYGEN, UP TO 24 HOURS

## 2020-09-25 PROCEDURE — 94668 MNPJ CHEST WALL SBSQ: CPT

## 2020-09-25 PROCEDURE — 27202055 HC BRONCHOSCOPE, DISP

## 2020-09-25 PROCEDURE — 36620 PR INSERT CATH,ART,PERCUT,SHORTTERM: ICD-10-PCS | Mod: 79,,, | Performed by: SURGERY

## 2020-09-25 PROCEDURE — 99292 PR CRITICAL CARE, ADDL 30 MIN: ICD-10-PCS | Mod: 25,,, | Performed by: SURGERY

## 2020-09-25 PROCEDURE — 63600175 PHARM REV CODE 636 W HCPCS: Performed by: STUDENT IN AN ORGANIZED HEALTH CARE EDUCATION/TRAINING PROGRAM

## 2020-09-25 PROCEDURE — 63600175 PHARM REV CODE 636 W HCPCS: Performed by: SURGERY

## 2020-09-25 PROCEDURE — 94002 VENT MGMT INPAT INIT DAY: CPT

## 2020-09-25 PROCEDURE — 94761 N-INVAS EAR/PLS OXIMETRY MLT: CPT

## 2020-09-25 PROCEDURE — 25500020 PHARM REV CODE 255: Performed by: SURGERY

## 2020-09-25 PROCEDURE — 85007 BL SMEAR W/DIFF WBC COUNT: CPT

## 2020-09-25 PROCEDURE — 99223 PR INITIAL HOSPITAL CARE,LEVL III: ICD-10-PCS | Mod: ,,, | Performed by: PSYCHIATRY & NEUROLOGY

## 2020-09-25 PROCEDURE — C9113 INJ PANTOPRAZOLE SODIUM, VIA: HCPCS | Performed by: STUDENT IN AN ORGANIZED HEALTH CARE EDUCATION/TRAINING PROGRAM

## 2020-09-25 PROCEDURE — 99900026 HC AIRWAY MAINTENANCE (STAT)

## 2020-09-25 PROCEDURE — 31500 INSERT EMERGENCY AIRWAY: CPT | Mod: 79,,, | Performed by: SURGERY

## 2020-09-25 PROCEDURE — 99291 PR CRITICAL CARE, E/M 30-74 MINUTES: ICD-10-PCS | Mod: 25,,, | Performed by: SURGERY

## 2020-09-25 PROCEDURE — 31624 PR BRONCHOSCOPY,DIAG2STIC W LAVAGE: ICD-10-PCS | Mod: 51,79,, | Performed by: SURGERY

## 2020-09-25 PROCEDURE — 80197 ASSAY OF TACROLIMUS: CPT

## 2020-09-25 PROCEDURE — 27200966 HC CLOSED SUCTION SYSTEM

## 2020-09-25 PROCEDURE — 80053 COMPREHEN METABOLIC PANEL: CPT

## 2020-09-25 PROCEDURE — 94640 AIRWAY INHALATION TREATMENT: CPT

## 2020-09-25 PROCEDURE — 25000003 PHARM REV CODE 250

## 2020-09-25 PROCEDURE — 84100 ASSAY OF PHOSPHORUS: CPT

## 2020-09-25 PROCEDURE — 36620 INSERTION CATHETER ARTERY: CPT | Mod: 79,,, | Performed by: SURGERY

## 2020-09-25 PROCEDURE — S0030 INJECTION, METRONIDAZOLE: HCPCS | Performed by: STUDENT IN AN ORGANIZED HEALTH CARE EDUCATION/TRAINING PROGRAM

## 2020-09-25 PROCEDURE — 87071 CULTURE AEROBIC QUANT OTHER: CPT

## 2020-09-25 PROCEDURE — 99223 1ST HOSP IP/OBS HIGH 75: CPT | Mod: ,,, | Performed by: PSYCHIATRY & NEUROLOGY

## 2020-09-25 PROCEDURE — 99292 CRITICAL CARE ADDL 30 MIN: CPT | Mod: 25,,, | Performed by: SURGERY

## 2020-09-25 PROCEDURE — 63600175 PHARM REV CODE 636 W HCPCS: Performed by: INTERNAL MEDICINE

## 2020-09-25 PROCEDURE — 63600175 PHARM REV CODE 636 W HCPCS

## 2020-09-25 PROCEDURE — 99291 CRITICAL CARE FIRST HOUR: CPT | Mod: 25,,, | Performed by: SURGERY

## 2020-09-25 PROCEDURE — 31624 DX BRONCHOSCOPE/LAVAGE: CPT | Mod: 51,79,, | Performed by: SURGERY

## 2020-09-25 RX ORDER — PROPOFOL 10 MG/ML
INJECTION, EMULSION INTRAVENOUS
Status: COMPLETED
Start: 2020-09-25 | End: 2020-09-25

## 2020-09-25 RX ORDER — PHENYLEPHRINE HCL IN 0.9% NACL 1 MG/10 ML
SYRINGE (ML) INTRAVENOUS
Status: COMPLETED
Start: 2020-09-25 | End: 2020-09-25

## 2020-09-25 RX ORDER — NOREPINEPHRINE BITARTRATE/D5W 4MG/250ML
0.02 PLASTIC BAG, INJECTION (ML) INTRAVENOUS CONTINUOUS
Status: DISCONTINUED | OUTPATIENT
Start: 2020-09-25 | End: 2020-09-26

## 2020-09-25 RX ORDER — FLUCONAZOLE 2 MG/ML
200 INJECTION, SOLUTION INTRAVENOUS DAILY
Status: DISCONTINUED | OUTPATIENT
Start: 2020-09-25 | End: 2020-09-25

## 2020-09-25 RX ORDER — FUROSEMIDE 10 MG/ML
INJECTION INTRAMUSCULAR; INTRAVENOUS
Status: COMPLETED
Start: 2020-09-25 | End: 2020-09-25

## 2020-09-25 RX ORDER — PHENYLEPHRINE HCL IN 0.9% NACL 1 MG/10 ML
100 SYRINGE (ML) INTRAVENOUS ONCE
Status: COMPLETED | OUTPATIENT
Start: 2020-09-25 | End: 2020-09-25

## 2020-09-25 RX ORDER — PROPOFOL 10 MG/ML
5 INJECTION, EMULSION INTRAVENOUS CONTINUOUS
Status: DISCONTINUED | OUTPATIENT
Start: 2020-09-25 | End: 2020-09-30

## 2020-09-25 RX ORDER — ROCURONIUM BROMIDE 10 MG/ML
INJECTION, SOLUTION INTRAVENOUS
Status: COMPLETED
Start: 2020-09-25 | End: 2020-09-25

## 2020-09-25 RX ORDER — PROPOFOL 10 MG/ML
50 VIAL (ML) INTRAVENOUS ONCE
Status: COMPLETED | OUTPATIENT
Start: 2020-09-25 | End: 2020-09-25

## 2020-09-25 RX ORDER — ROCURONIUM BROMIDE 10 MG/ML
100 INJECTION, SOLUTION INTRAVENOUS ONCE
Status: COMPLETED | OUTPATIENT
Start: 2020-09-25 | End: 2020-09-25

## 2020-09-25 RX ADMIN — FLUCONAZOLE, SODIUM CHLORIDE 200 MG: 2 INJECTION INTRAVENOUS at 09:09

## 2020-09-25 RX ADMIN — METRONIDAZOLE 500 MG: 500 INJECTION, SOLUTION INTRAVENOUS at 02:09

## 2020-09-25 RX ADMIN — MAGNESIUM SULFATE HEPTAHYDRATE: 500 INJECTION, SOLUTION INTRAMUSCULAR; INTRAVENOUS at 10:09

## 2020-09-25 RX ADMIN — PROPOFOL 20 MCG/KG/MIN: 10 INJECTION, EMULSION INTRAVENOUS at 06:09

## 2020-09-25 RX ADMIN — IPRATROPIUM BROMIDE AND ALBUTEROL SULFATE 3 ML: .5; 2.5 SOLUTION RESPIRATORY (INHALATION) at 04:09

## 2020-09-25 RX ADMIN — VANCOMYCIN HYDROCHLORIDE 1750 MG: 750 INJECTION, POWDER, LYOPHILIZED, FOR SOLUTION INTRAVENOUS at 07:09

## 2020-09-25 RX ADMIN — HEPARIN SODIUM 5000 UNITS: 5000 INJECTION INTRAVENOUS; SUBCUTANEOUS at 02:09

## 2020-09-25 RX ADMIN — HEPARIN SODIUM 5000 UNITS: 5000 INJECTION INTRAVENOUS; SUBCUTANEOUS at 05:09

## 2020-09-25 RX ADMIN — ROCURONIUM BROMIDE 100 MG: 10 INJECTION, SOLUTION INTRAVENOUS at 12:09

## 2020-09-25 RX ADMIN — LEVOTHYROXINE SODIUM ANHYDROUS 40 MCG: 100 INJECTION, POWDER, LYOPHILIZED, FOR SOLUTION INTRAVENOUS at 08:09

## 2020-09-25 RX ADMIN — MEROPENEM 2 G: 1 INJECTION, POWDER, FOR SOLUTION INTRAVENOUS at 07:09

## 2020-09-25 RX ADMIN — FUROSEMIDE 40 MG: 10 INJECTION, SOLUTION INTRAMUSCULAR; INTRAVENOUS at 09:09

## 2020-09-25 RX ADMIN — IOHEXOL 75 ML: 350 INJECTION, SOLUTION INTRAVENOUS at 11:09

## 2020-09-25 RX ADMIN — PANTOPRAZOLE SODIUM 40 MG: 40 INJECTION, POWDER, LYOPHILIZED, FOR SOLUTION INTRAVENOUS at 08:09

## 2020-09-25 RX ADMIN — PROPOFOL 5 MCG/KG/MIN: 10 INJECTION, EMULSION INTRAVENOUS at 01:09

## 2020-09-25 RX ADMIN — PROPOFOL 50 MG: 10 INJECTION, EMULSION INTRAVENOUS at 12:09

## 2020-09-25 RX ADMIN — METRONIDAZOLE 500 MG: 500 INJECTION, SOLUTION INTRAVENOUS at 10:09

## 2020-09-25 RX ADMIN — CEFTRIAXONE 2 G: 2 INJECTION, SOLUTION INTRAVENOUS at 10:09

## 2020-09-25 RX ADMIN — METRONIDAZOLE 500 MG: 500 INJECTION, SOLUTION INTRAVENOUS at 06:09

## 2020-09-25 RX ADMIN — Medication 1000 MCG: at 12:09

## 2020-09-25 RX ADMIN — IOHEXOL 15 ML: 350 INJECTION, SOLUTION INTRAVENOUS at 10:09

## 2020-09-25 RX ADMIN — MICAFUNGIN SODIUM 100 MG: 20 INJECTION, POWDER, LYOPHILIZED, FOR SOLUTION INTRAVENOUS at 07:09

## 2020-09-25 RX ADMIN — TACROLIMUS 0.5 MG: 0.5 CAPSULE ORAL at 05:09

## 2020-09-25 RX ADMIN — POTASSIUM CHLORIDE 40 MEQ: 29.8 INJECTION, SOLUTION INTRAVENOUS at 04:09

## 2020-09-25 RX ADMIN — HEPARIN SODIUM 5000 UNITS: 5000 INJECTION INTRAVENOUS; SUBCUTANEOUS at 10:09

## 2020-09-25 RX ADMIN — Medication 50 MG: at 12:09

## 2020-09-25 NOTE — TREATMENT PLAN
Hepatology Treatment Plan    Elda Hernandez is a 50 y.o. female admitted to hospital 9/3/2020 (Hospital Day: 23) due to Perforated abdominal viscus.     Interval History  - reintubated in afternoon  - abscess culture growing multiple organisms including fungal  - recurrent leak on CT today  - on ceftriaxone and fluc    Objective  Temp:  [99.7 °F (37.6 °C)-100.4 °F (38 °C)] 100.4 °F (38 °C) (09/25 1145)  Pulse:  [] 119 (09/25 1441)  BP: ()/() 129/72 (09/25 1300)  Resp:  [12-35] 18 (09/25 1441)  SpO2:  [88 %-100 %] 97 % (09/25 1441)    Laboratory    Lab Results   Component Value Date    WBC 18.46 (H) 09/25/2020    HGB 7.5 (L) 09/25/2020    HCT 24.6 (L) 09/25/2020    MCV 93 09/25/2020     09/25/2020       Lab Results   Component Value Date     09/25/2020    K 3.9 09/25/2020     (H) 09/25/2020    CO2 21 (L) 09/25/2020    BUN 48 (H) 09/25/2020    CREATININE 1.1 09/25/2020    CALCIUM 8.1 (L) 09/25/2020       Lab Results   Component Value Date    ALBUMIN 1.2 (L) 09/25/2020    ALT 23 09/25/2020    AST 38 09/25/2020     (H) 04/07/2018    ALKPHOS 208 (H) 09/25/2020    BILITOT 2.6 (H) 09/25/2020       Lab Results   Component Value Date    INR 1.1 09/21/2020    INR 1.1 09/20/2020    INR 1.1 09/19/2020       MELD-Na score: 12 at 9/23/2020 12:00 PM  MELD score: 12 at 9/23/2020 12:00 PM  Calculated from:  Serum Creatinine: 1.0 mg/dL at 9/23/2020 12:00 PM  Serum Sodium: 144 mmol/L (Rounded to 137 mmol/L) at 9/23/2020 12:00 PM  Total Bilirubin: 2.9 mg/dL at 9/23/2020 12:00 PM  INR(ratio): 1.1 at 9/21/2020  3:00 AM  Age: 50 years 4 months    Plan  - continue tac 0.5mg sublingual BID  - given polymicrobial infection including fungal, recommend consultation with transplant ID, will likely require escalation of therapy  - concern for ongoing leak, management per ICU/surgery team appreciated  - please obtain daily CBC, BMP, LFT, INR, tacro  - Plan of care was discussed with primary  team    Thank you for involving us in the care of Elda Hernandez. Please call with any additional concerns or questions.    John Mustafa MD  Gastroenterology Fellow

## 2020-09-25 NOTE — PROCEDURES
"Elda Hernandez is a 50 y.o. female patient.    Temp: (!) 100.4 °F (38 °C) (09/25/20 1145)  Pulse: (!) 123 (09/25/20 1230)  Resp: 18 (09/25/20 1230)  BP: (!) 120/56 (09/25/20 1230)  SpO2: 99 % (09/25/20 1230)  Weight: 67.6 kg (149 lb) (09/18/20 1854)  Height: 4' 11" (149.9 cm) (09/22/20 6619)       Procedures     Fiberoptic Bronchoscopy     DATE OF PROCEDURE: 9/25/2020     Informed consent:  Emergent procedure for respiratory failure     Operation Detail:    Fiberoptic Bronchoscopy was the performed introducing the bronchoscopy through the endotracheal tube. Copious thick mucous was seen at the amanda extending down both mainstem bronchus. BAL of the left mainstem bronchus was performed. The right bronchus intermedius and upper lobe take off was examined and more thick mucous suctioned out. ETT was retracted to ~3cm from the amanda.     Carl Pino  9/25/2020  "

## 2020-09-25 NOTE — ASSESSMENT & PLAN NOTE
Patient with anisocoria, L mydriasis , apparently has baseline mydriasis but worse noted early this AM w/ sluggish nature.   Left eye also appears to have conjunctival injection as well some discharge and palpebral erythema as well.  No other focal deficits noted on exam w/ symmetrical strength, following commands, etc. Her baseline is anarthria per nurse.    Recent ipratropium nebulizer given early this AM which may have contributed to the worsening anisocoria if any leakage occurred.  CT scan w/o any hemorrhage, uncal herniation or brainstem compression and no structurally identified causes for unequal pupils.  Mild compression from aneurysm could cause some anisocoria however this is very unlikely - can be ruled out with vessel imaging (MRA or CTA) if felt necessary.    Will continue to monitor throughout the day and gave instructions to nursing to call back if neuro exam changes.

## 2020-09-25 NOTE — NURSING
Patient returned to [id number] attached to portable EKG monitoring and with O2 set @ 10 L. Questions and concerns addressed. VSS at this time. Gtts: TPN. Charge RN and MD made aware of arrival. New orders given and implemented. Neurology MD at the bedside to assess patient's neuro status. EZEQUIEL.

## 2020-09-25 NOTE — HPI
50F w/ consult for worsening anisocoria (L > R) and more sluggish than usual, as well as decreased ability to nod yes / no (baseline nonverbal during hospitalization).  The aforementioned symptoms have never happened before. There are no identified triggers or modifying factors. There have been no recurrent events. There are no other associated symptoms.

## 2020-09-25 NOTE — PLAN OF CARE
SICU PLAN OF CARE NOTE     Dx: Perforated abdominal viscus     Shift Events: VSS at this time. T Max: 100.4 F. Sinus tachycardia. At beginning of shift, patient noted to have left pupil dilated and sluggish. STAT head CT performed. No acute findings. Believed to have been a result of breathing treatment leaking into eye per Neurology MD assessment. Patient able to follow commands. @ 0900 KERRY drain noted to have green, thick, yellow, milky drainage. STAT CT abdomen/pelvis ordered. Leaking around stomach noted. MDs aware. KERRY drain since decreased in amount and became more serous. After returning from CT the second time, patient required to be emergently intubated @ 1200. Current ventilator settings AC/VC 80% 5 PEEP. Bronchoscopy performed at the bedside. Culture sent. Arterial line placed as well. Plan of care reviewed with patient, , and daughter. Questions and concerns addressed. WCTM.      Goals of Care: Monitor Drains      Neuro: Follows Commands and Moves All Extremities     Respiratory: Ventilator      Diet: TPN/Propofol      Urine Output: Urinary Catheter 540 cc/shift     Drains: KERRY Drain #1, total output 20 cc / shift, KERRY Drain #2, total output 200 cc / shift, G-tube/J-tube, total output 50 cc /  shift and NG/OG Tube 50 cc /  shift  Wound Vac: Continuous @ 125. 50 cc/shift      Labs/Accuchecks: Accuchecks Q 4 hr.       Skin: Abdominal incision CDI and approximated with staples. Wound vac also in place to abdomen. Right arm skin tear dressing CDI with foam. Left upper leg scratches GABY. Sacral wound applied triad cream.  Bed plugged in, properly working, & set for patient weight. Foam applied to heels. Turned patient Q 2 hr.

## 2020-09-25 NOTE — NURSING
Dr. Hidalgo & General Surgery Team at bedside assessing patient. KERRY drains, wound wac, & G tube all patent and intact. No new orders placed at this time. Will continue to monitor.

## 2020-09-25 NOTE — RESPIRATORY THERAPY
Patient emergently intubated with 8.0 ETT secured at the 21 lip with commercial tube de la cruz.  Patient placed on 980 vent at documented settings.  Bronch performed immediately after intubation.  Will continue to monitor.

## 2020-09-25 NOTE — ASSESSMENT & PLAN NOTE
Neuro:  - sedation: none  - analgesia: PRN oxy. Dilauded PRN  - Tylenol  - haldol q6hr PRN for agitation     CV:   - HDS off of pressors  - continue to monitor    Pulm:   - Extubated 9/22  - Oxygen requirements increased acutely this morning. Now satting in the mid-90's on 10L NC O2  - Chest xray with no acute changes. Fluid overload improved.  - Close intubation watch  - Duonebs Q6 PRN     FEN/GI: Perforated stomach; s/p washout and patch (9/4); abdominal closure (9/6)  - s/p ex-lap on 9/19   - continue to hold tube feeds at this time  - NG tube, G tube, and L abdominal drains in place. Purulent output from bulb drain noted.  - G tube upsized  - Lactulose discontinued (9/14) in setting of normal ammonia level (9/10) and improving mental status  - Daily metabolic panel with PRN repletion of electrolytes  - pantoprazole for ulcer ppx  - Cont TPN     Renal:  - Continue to monitor with daily metabolic labs  - nephrology on board, appreciate recs  - Bun/Cr 40/1.1 today; continues to improve  - UOP 2L yesterday. NET -300mL yesterday  - Bhardwaj in place; monitoring I/Os  - Lasix 40 BID yesterday. No diuresis planned for today.     HEME/ID:   - Afebrile  - H/H stable. 7.5/24.6 today    - Daily CBC  - s/p Liver transplant in 2002; Daily tacrolimus level with AM labs. Per hepatology, continue tacrolimus 1mg BID  - acetaminophen PRN  - Metronidazole and Ceftriaxone  - heparin for DVT ppx  - Abdominal culture grew Klebsiella pneumonia; sensitive to zosyn  - CT Abd/pelvis for suspected abdominal source of decompensation.    Endo:  - Synthroid 40mcg daily  - insulin aspart for blood glucose control    Dispo:  - Continue SICU care. CT abd/pelvis ordered. MRI head possible for further investigation.

## 2020-09-25 NOTE — PLAN OF CARE
"      SICU PLAN OF CARE NOTE    Dx: Perforated abdominal viscus    Shift Events: No acute events this shift. T Max: 100.3 F (oral). Sinus tachycardia. Nasal cannula 3 L, maintaining SpO2>95%. Patient follows commands, non verbal. Frequent suctioning required, patient having difficulty coughing up secretions. Abdomen round & soft, staples clean dry intact, with no drainage. Abdominal binder in place. Pain controlled with PRN Dilaudid.     Goals of Care: SBP<180    Neuro: Follows Commands and Moves All Extremities    Vital Signs: BP (!) 142/68   Pulse (!) 112   Temp 100.3 °F (37.9 °C) (Oral)   Resp (!) 22   Ht 4' 11" (1.499 m)   Wt 67.6 kg (149 lb)   SpO2 98%   BMI 30.09 kg/m²     Respiratory: Nasal Cannula    Diet: TPN    Urine Output: Urinary Catheter 955 cc/shift    Drains: KERRY Drain, total output 18 cc / shift, G-tube/J-tube, total output 200 cc /  shift and NG/OG Tube 50 cc /  shift    Wound Vac Continuous @ 125. Minimal drainage this shift.      Labs/Accuchecks: Accuchecks Q 4 hr. All labs trended. See flowsheets.     Skin: Bed plugged in, properly working, & set for patient weight. Foam applied to heels. Turned patient Q 2 hr. Applied triad cream to sacral area. All wound care administered per orders. See flowsheets.        "

## 2020-09-25 NOTE — PROGRESS NOTES
[facility] [address]  General Surgery  Progress Note    Subjective:     History of Present Illness:  Ms. Hernandez is a 51yo F w/PMH of von Gierke disease s/p liver transplant (2002, on tacro + MMF, follows Dr. Nielsen), hx chronic rejection (bx 2015 with acute and chronic rejection s/p steroids), biliary stricture and ductopenic rejection, recently with cirrhosis on fibroscan (10/2019), HTN, and depression who presents as a transfer for further evaluation of gastric outlet obstruction and esophageal stricturing.  Patient decompensated requiring multiple pressors and intubation. CT scan showed free air. Prior to intubation patient reported severe abdominal pain.   states that patient has had epigastric pain, nausea, and vomiting for several days.     Post-Op Info:  Procedure(s) (LRB):  LAPAROTOMY, EXPLORATORY, DRAINAGE OF ABSCESS, REPAIR OF GASTRIC PERFORATION, GASTROSTOMY TUBE PLACEMENT (N/A)  APPLICATION, WOUND VAC (N/A)   7 Days Post-Op     Interval History: NAEON. Continues on 3L high flow. Patient awake but non verbal and not following commands.     Medications:  Continuous Infusions:   TPN ADULT CENTRAL LINE CUSTOM 60 mL/hr at 09/24/20 0800     Scheduled Meds:   cefTRIAXone (ROCEPHIN) IVPB  2 g Intravenous Q24H    fat emulsion 20%  250 mL Intravenous Daily    furosemide (LASIX) IV  40 mg Intravenous Q12H    heparin (porcine)  5,000 Units Subcutaneous Q8H    levothyroxine  40 mcg Intravenous Daily    metronidazole  500 mg Intravenous Q8H    olanzapine zydis  10 mg Sublingual QHS    pantoprazole  40 mg Intravenous Daily    tacrolimus  0.5 mg Sublingual BID     PRN Meds:sodium chloride, acetaminophen, albuterol-ipratropium, calcium gluconate IVPB, calcium gluconate IVPB, calcium gluconate IVPB, dextrose 50%, dextrose 50%, glucagon (human recombinant), glucose, glucose, haloperidol lactate, insulin aspart U-100, labetaloL, magnesium sulfate IVPB, magnesium sulfate IVPB, ondansetron,  potassium chloride in water **AND** potassium chloride in water **AND** potassium chloride in water, promethazine, sodium chloride 0.9%, sodium chloride 0.9%, sodium phosphate IVPB, sodium phosphate IVPB, sodium phosphate IVPB     Review of patient's allergies indicates:   Allergen Reactions    Codeine Itching     Other reaction(s): Itching    Lipitor [atorvastatin] Other (See Comments)     Other reaction(s): Muscle pain  Muscle cranmps    Morphine Itching     Other reaction(s): nausea and vomiting     Zoloft [sertraline] Other (See Comments)     Tremors/muscle spasms     Objective:     Vital Signs (Most Recent):  Temp: 100 °F (37.8 °C) (09/24/20 0700)  Pulse: (!) 114 (09/24/20 0835)  Resp: (!) 32 (09/24/20 0835)  BP: (!) 148/70 (09/24/20 0830)  SpO2: 97 % (09/24/20 0835) Vital Signs (24h Range):  Temp:  [98.4 °F (36.9 °C)-100 °F (37.8 °C)] 100 °F (37.8 °C)  Pulse:  [114-127] 114  Resp:  [31-45] 32  SpO2:  [92 %-100 %] 97 %  BP: (110-180)/(56-93) 148/70     Weight: 67.6 kg (149 lb)  Body mass index is 30.09 kg/m².    Intake/Output - Last 3 Shifts       09/22 0700 - 09/23 0659 09/23 0700 - 09/24 0659 09/24 0700 - 09/25 0659    I.V. (mL/kg) 1538 (22.8) 1079 (16)     NG/GT 70      TPN 1344.5 1683     Total Intake(mL/kg) 2952.5 (43.7) 2762 (40.9)     Urine (mL/kg/hr) 1520 (0.9) 4990 (3.1) 125 (0.9)    Drains 805 570 100    Other 0 45 0    Total Output 2325 5605 225    Net +627.5 -7863 -225                 Physical Exam  Vitals signs and nursing note reviewed.   Constitutional:       General: She is not in acute distress.     Appearance: Normal appearance.   HENT:      Head: Normocephalic and atraumatic.   Cardiovascular:      Rate and Rhythm: Normal rate and regular rhythm.   Pulmonary:      Effort: Pulmonary effort is normal. No respiratory distress.   Abdominal:      General: There is no distension.      Palpations: Abdomen is soft.      Tenderness: There is no abdominal tenderness.      Comments: Incision  c/d/i  G tube to gravity, minimal drainage on drain sponge  Abdominal binder in place   Skin:     General: Skin is warm and dry.   Neurological:      General: No focal deficit present.      Mental Status: She is alert and oriented to person, place, and time.   Psychiatric:         Mood and Affect: Mood normal.         Behavior: Behavior normal.         Significant Labs:  CBC:   Recent Labs   Lab 09/24/20  0300   WBC 13.40*   RBC 2.74*   HGB 7.7*   HCT 24.7*      MCV 90   MCH 28.1   MCHC 31.2*     CMP:   Recent Labs   Lab 09/24/20  0300      CALCIUM 8.1*   ALBUMIN 1.2*   PROT 6.1      K 3.5   CO2 20*   *   BUN 40*   CREATININE 1.1   ALKPHOS 190*   ALT 24   AST 28   BILITOT 2.8*       Significant Diagnostics:  I have reviewed all pertinent imaging results/findings within the past 24 hours.    Assessment/Plan:     * Perforated abdominal viscus  50 y.o. female w/ free air on CT scan. S/p emergent ex lap on 9/4 w/ findings of gastric perforation, primary repair. Now w/  abdominal closure on 9/6, skin stapled closed, KERRY drains removed. Class A to OR on 9/18 for free air and extrav or oral contrast seen on CT     Continue SICU care, appreciate their assistance  G tube and NG to gravity/suction  Wean oxygen as tolerated  Wound vac changes w/ wound care        Rocio Hidalgo MD  General Surgery  [facility]

## 2020-09-25 NOTE — PT/OT/SLP PROGRESS
Physical Therapy      Patient Name:  Elda Hernandez   MRN:  [id number]    Patient not seen today. Pt to receive CT of abdomen and not medically stable for therapy on this date. Will follow-up when appropriate.    Kamila Rowley, PT, DPT  9/25/2020  [phone]

## 2020-09-25 NOTE — CONSULTS
[facility]  Vascular Neurology  Comprehensive Stroke Center  Consult Note    Inpatient consult to Vascular (Stroke) Neurology  Consult performed by: Joel Hood MD  Consult ordered by: Khoa Damon MD        Assessment/Plan:     Patient is a 50 y.o. year old female with:    Anisocoria  Patient with anisocoria, L mydriasis , apparently has baseline mydriasis but worse noted early this AM w/ sluggish nature.   Left eye also appears to have conjunctival injection as well some discharge and palpebral erythema as well.  No other focal deficits noted on exam w/ symmetrical strength, following commands, etc. Her baseline is anarthria per nurse.    Recent ipratropium nebulizer given early this AM which may have contributed to the worsening anisocoria if any leakage occurred.  CT scan w/o any hemorrhage, uncal herniation or brainstem compression and no structurally identified causes for unequal pupils.  Mild compression from aneurysm could cause some anisocoria however this is very unlikely - can be ruled out with vessel imaging (MRA or CTA) if felt necessary.    Will continue to monitor throughout the day and gave instructions to nursing to call back if neuro exam changes.        STROKE DOCUMENTATION          NIH Scale:  1a. Level of Consciousness: 0-->Alert, keenly responsive  1b. LOC Questions: 2-->Answers neither question correctly  1c. LOC Commands: 0-->Performs both tasks correctly  2. Best Gaze: 0-->Normal  3. Visual: 0-->No visual loss  4. Facial Palsy: 0-->Normal symmetrical movements  5a. Motor Arm, Left: 1-->Drift, limb holds 90 (or 45) degrees, but drifts down before full 10 seconds, does not hit bed or other support  5b. Motor Arm, Right: 1-->Drift, limb holds 90 (or 45) degrees, but drifts down before full 10 secs, does not hit bed or other support  6a. Motor Leg, Left: 3-->No effort against gravity, leg falls to bed immediately  6b. Motor Leg, Right: 3-->No effort against gravity,  leg falls to bed immediately  7. Limb Ataxia: 0-->Absent  8. Sensory: 0-->Normal, no sensory loss  9. Best Language: 2-->Severe aphasia, all communication is through fragmentary expression, great need for inference, questioning, and guessing by the listener. Range of information that can be exchanged is limited, listener carries burden of. . . (see row details)  10. Dysarthria: 2-->Severe dysarthria, patients speech is so slurred as to be unintelligible in the absence of or out of proportion to any dysphasia, or is mute/anarthric  11. Extinction and Inattention (formerly Neglect): 0-->No abnormality  Total (NIH Stroke Scale): 14    Modified Jil    Rhodelia Coma Scale:    ABCD2 Score:    [id number] Score:   HAS -BLED Score:   ICH Score:   Hunt & Benitez Classification:       Thrombolysis Candidate? No, Strong suspicion for stroke mimic or alternative diagnosis     Delays to Thrombolysis?  No    Interventional Revascularization Candidate?   Is the patient eligible for mechanical endovascular reperfusion (ALFA)?  No; No large vessel occlusion      Hemorrhagic change of an Ischemic Stroke: Does this patient have an ischemic stroke with hemorrhagic changes? No     Subjective:     History of Present Illness:  50F w/ consult for worsening anisocoria (L > R) and more sluggish than usual, as well as decreased ability to nod yes / no (baseline nonverbal during hospitalization).  The aforementioned symptoms have never happened before. There are no identified triggers or modifying factors. There have been no recurrent events. There are no other associated symptoms.            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    Arnold-Chiari malformation     Depression     Esophageal stricture     Essential tremor     Hypertension     Left bundle branch block     Liver fibrosis, transplanted liver 10/2/2018    Suggested on fibroscan 10/2/18    Migraine without aura     MVP (mitral valve  prolapse)     Non-rheumatic mitral regurgitation 10/1/2018    Non-rheumatic tricuspid valve insufficiency 10/1/2018    Osteoporosis     Recurrent urinary tract infection     Seizures     Shingles 2007    SIADH (syndrome of inappropriate ADH production)     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    Tricuspid valve prolapse     Urolithiasis     Von Gierke disease     s/p liver transplant     Past Surgical History:   Procedure Laterality Date    APPENDECTOMY  6/22/2007    APPLICATION OF WOUND VACUUM-ASSISTED CLOSURE DEVICE N/A 9/18/2020    Procedure: APPLICATION, WOUND VAC;  Surgeon: Zain Decker MD;  Location: [address];  Service: General;  Laterality: N/A;    COLONOSCOPY  5/13/2008    internal hemorrhoids    CRANIOTOMY      ESOPHAGOGASTRODUODENOSCOPY N/A 9/3/2020    Procedure: EGD (ESOPHAGOGASTRODUODENOSCOPY);  Surgeon: Tyrel Vergara MD;  Location: [facility];  Service: Endoscopy;  Laterality: N/A;    LAMINECTOMY  3/2001    LIVER TRANSPLANT  9/23/2002    OSSICULAR RECONSTRUCTION  10/4/1995    RIGHT REPLACEMENT PROSTHESIS for cholesteatoma    THYMECTOMY  5/2/2007    TONSILLECTOMY, ADENOIDECTOMY  1/21/2004    TOTAL ABDOMINAL HYSTERECTOMY  3/31/1994     History reviewed. No pertinent family history.  Social History     Tobacco Use    Smoking status: Never Smoker   Substance Use Topics    Alcohol use: No    Drug use: No     Review of patient's allergies indicates:   Allergen Reactions    Codeine Itching     Other reaction(s): Itching    Lipitor [atorvastatin] Other (See Comments)     Other reaction(s): Muscle pain  Muscle cranmps    Morphine Itching     Other reaction(s): nausea and vomiting     Zoloft [sertraline] Other (See Comments)     Tremors/muscle spasms       Medications: I have reviewed the current medication administration record.    Medications Prior to Admission   Medication Sig Dispense Refill Last Dose    mycophenolate (CELLCEPT) 250 mg Cap Take 2 capsules  (500 mg total) by mouth 2 (two) times daily. 360 capsule 6     tacrolimus (PROGRAF) 1 MG Cap TAKE 2 CAPSULES BY MOUTH EVERY MORNING AND 1 CAPSULE EVERY EVENING 90 capsule 11     calcium carbonate (OS-JASON) 600 mg (1,500 mg) Tab Take 600 mg by mouth 2 (two) times daily with meals.       demeclocycline (DECLOMYCIN) 150 MG Tab Take 150 mg by mouth every 12 (twelve) hours.       DENAVIR 1 % cream RICHAR EXT AA Q 2 H FOR 4 DAYS  0     levothyroxine (SYNTHROID) 75 MCG tablet Take 75 mcg by mouth once daily.       lifitegrast (XIIDRA) 5 % Dpet Place 1 drop into both eyes 2 (two) times daily. (Patient not taking: Reported on 3/3/2020) 60 each 11     magnesium oxide (MAG-OX) 400 mg (241.3 mg magnesium) tablet TK 2 TS PO BID  5     multivitamin capsule Take 1 capsule by mouth once daily.       potassium chloride (KLOR-CON) 10 MEQ TbSR Take 4 tablets (40 mEq total) by mouth once daily. 120 tablet 3     promethazine (PHENERGAN) 25 MG tablet Take 25 mg by mouth every 4 (four) hours as needed (if unrelieved by zofran).   5     triamcinolone acetonide 0.1% (KENALOG) 0.1 % cream Apply topically 2 (two) times daily. (Patient not taking: Reported on 3/3/2020) 454 g 1        Review of Systems   Constitutional: Positive for diaphoresis. Negative for appetite change.   HENT: Negative for congestion.    Eyes: Negative for discharge.   Respiratory: Negative for shortness of breath.    Cardiovascular: Negative for chest pain.   Gastrointestinal: Negative for abdominal pain.   Endocrine: Negative for cold intolerance.   Genitourinary: Negative for difficulty urinating.   Musculoskeletal: Negative for joint swelling.   Skin: Negative for color change.     Objective:     Vital Signs (Most Recent):  Temp: 99.8 °F (37.7 °C) (09/25/20 0300)  Pulse: (!) 113 (09/25/20 0812)  Resp: (!) 29 (09/25/20 0812)  BP: (!) 149/65 (09/25/20 0700)  SpO2: 95 % (09/25/20 0812)    Vital Signs Range (Last 24H):  Temp:  [99.8 °F (37.7 °C)-100.9 °F (38.3 °C)]    Pulse:  []   Resp:  [21-35]   BP: (132-182)/()   SpO2:  [88 %-100 %]     Physical Exam  Constitutional:       General: She is not in acute distress.  HENT:      Head: Normocephalic.      Right Ear: External ear normal.      Left Ear: External ear normal.   Eyes:      General: Scleral icterus present.         Left eye: Discharge present.     Comments: Conjunctival injection w/ palpebral injection on left eye     Neck:      Musculoskeletal: Normal range of motion.   Pulmonary:      Effort: Pulmonary effort is normal.      Breath sounds: No stridor.   Abdominal:      Tenderness: There is no abdominal tenderness.   Skin:     General: Skin is dry.      Findings: No rash.         Neurological Exam:   LOC: alert  Attention Span: Good   Language: No aphasia, no verbal output (baseline) but able to follow simple one step commands and tracks examiner  Articulation: Mute/Anarthric  Visual Fields: Full  on BTT bilaterally in each globe individually  EOM (CN III, IV, VI): Full/intact  Pupils (CN II, III): Anisocoria Side: L pupil 6 mm, sluggishly reactive; no APD noted mm Reactive: Yes Sluggish  Facial Movement (CN VII): Symmetric facial expression    Motor: Arm left  Paresis: 4/5  Leg left  Paresis: 2/5  Arm right  Paresis: 4/5  Leg right Paresis: 2/5  Sensation: intact to touch bilaterally      Laboratory:  CMP:   Recent Labs   Lab 09/25/20  0300   CALCIUM 8.1*   ALBUMIN 1.2*   PROT 6.4      K 3.9   CO2 21*   *   BUN 48*   CREATININE 1.1   ALKPHOS 208*   ALT 23   AST 38   BILITOT 2.6*     CBC:   Recent Labs   Lab 09/25/20  0300   WBC 18.46*   RBC 2.65*   HGB 7.5*   HCT 24.6*      MCV 93   MCH 28.3   MCHC 30.5*     Lipid Panel:   Recent Labs   Lab 09/22/20  0324   TRIG 197*  197*     Coagulation:   Recent Labs   Lab 09/21/20  0300   INR 1.1   APTT 31.9     Hgb A1C: No results for input(s): HGBA1C in the last 168 hours.  TSH: No results for input(s): TSH in the last 168 hours.    Diagnostic  Results:      Brain imaging:  CTH - no clear defined infarct or early ischemic change or hemorrhage    Vessel Imaging:  None    Cardiac Evaluation:   None      Joel Hood MD  Comprehensive Stroke Center  Department of Vascular Neurology   [facility]

## 2020-09-25 NOTE — PROGRESS NOTES
Pharmacokinetic Initial Assessment: IV Vancomycin    Assessment/Plan:    Initiate intravenous vancomycin with loading dose of 1750 mg once followed by a maintenance dose of vancomycin 1250mg IV every 24 hours  Desired empiric serum trough concentration is 15 to 20 mcg/mL  Draw vancomycin trough level 60 min prior to fourth dose on 9/28 at approximately 1845  Pharmacy will continue to follow and monitor vancomycin.      Thank you for allowing pharmacy participate in this patient's care.     Amber Carl, PharmD  Clinical Pharmacist, [facility] Pharmacy/[facility] Team  [email or web address]  office [phone]       Patient brief summary:  Elda Hernandez is a 50 y.o. female initiated on antimicrobial therapy with IV Vancomycin for treatment of suspected lower respiratory infection    Drug Allergies:   Review of patient's allergies indicates:   Allergen Reactions    Codeine Itching     Other reaction(s): Itching    Lipitor [atorvastatin] Other (See Comments)     Other reaction(s): Muscle pain  Muscle cranmps    Morphine Itching     Other reaction(s): nausea and vomiting     Zoloft [sertraline] Other (See Comments)     Tremors/muscle spasms       Actual Body Weight:   67.6kg    Renal Function:   Estimated Creatinine Clearance: 63.8 mL/min (based on SCr of 1.1 mg/dL).,     Dialysis Method (if applicable):  N/A    CBC (last 72 hours):  Recent Labs   Lab Result Units 09/23/20  0343 09/24/20  0300 09/25/20  0300   WBC K/uL 10.58 13.40* 18.46*   Hemoglobin g/dL 7.5* 7.7* 7.5*   Hematocrit % 24.4* 24.7* 24.6*   Platelets K/uL 184 195 229   Gran% % 73.0 73.0 87.0*   Lymph% % 10.1* 10.7* 2.5*   Mono% % 10.8 9.9 3.0*   Eosinophil% % 2.6 2.3 1.0   Basophil% % 0.3 0.4 1.0   Differential Method  Automated Automated Manual       Metabolic Panel (last 72 hours):  Recent Labs   Lab Result Units 09/23/20  0343 09/23/20  1200 09/24/20  0300 09/25/20  0300   Sodium mmol/L 144 144 145 143   Potassium mmol/L 3.4* 4.2 3.5 3.9   Chloride mmol/L  117* 115* 113* 111*   CO2 mmol/L 18* 19* 20* 21*   Glucose mg/dL 107 124* 106 114*   BUN, Bld mg/dL 38* 40* 40* 48*   Creatinine mg/dL 0.9 1.0 1.1 1.1   Albumin g/dL 1.1* 1.1* 1.2* 1.2*   Total Bilirubin mg/dL 3.1* 2.9* 2.8* 2.6*   Alkaline Phosphatase U/L 184* 192* 190* 208*   AST U/L 33 27 28 38   ALT U/L 25 25 24 23   Magnesium mg/dL 1.8  --  2.0 2.1   Phosphorus mg/dL 2.4*  --  3.9 4.5       Drug levels (last 3 results):  No results for input(s): VANCOMYCINRA, VANCOMYCINPE, VANCOMYCINTR in the last 72 hours.    Microbiologic Results:  Microbiology Results (last 7 days)       Procedure Component Value Units Date/Time    Culture, VAP (BAL) [[id number]] Collected: 09/25/20 1238    Order Status: Completed Specimen: Bronchial Alveolar Lavage from BAL, LLL Updated: 09/25/20 1659     Gram Stain (Respiratory) <10 epithelial cells per low power field.     Gram Stain (Respiratory) Rare WBC's     Gram Stain (Respiratory) No organisms seen    Fungus culture [[id number]]  (Abnormal) Collected: 09/18/20 1544    Order Status: Completed Specimen: Abscess from Abdomen Updated: 09/24/20 0945     Fungus (Mycology) Culture YEAST   Rare  Identification pending      Narrative:      1. Inter-Abdominal Abscess for cultures  2. Inter-Abdominal Abscess for cultures    Culture, Anaerobe [[id number]]  (Abnormal) Collected: 09/18/20 1544    Order Status: Completed Specimen: Abscess from Abdomen Updated: 09/24/20 0706     Anaerobic Culture BACTEROIDES THETAIOTAOMICRON  Moderate      Narrative:      1. Inter-Abdominal Abscess for cultures  2. Inter-Abdominal Abscess for cultures    Aerobic culture [[id number]]  (Abnormal)  (Susceptibility) Collected: 09/18/20 1544    Order Status: Completed Specimen: Abscess from Abdomen Updated: 09/21/20 1028     Aerobic Bacterial Culture KLEBSIELLA PNEUMONIAE  Many      Narrative:      1. Inter-Abdominal Abscess for cultures  2. Inter-Abdominal Abscess for cultures

## 2020-09-25 NOTE — SUBJECTIVE & OBJECTIVE
Interval History/Significant Events:   - Blown pupil and LUE weakness upon morning exam. CT head performed. Waiting for official read. Likely MRI head to follow.  - Respiratory status in question. Increased oxygen requirements. Close intubation watch.  - Abd tense. WBC up. CT abd/pelvis to investigate possible source    Follow-up For: Procedure(s) (LRB):  LAPAROTOMY, EXPLORATORY, DRAINAGE OF ABSCESS, REPAIR OF GASTRIC PERFORATION, GASTROSTOMY TUBE PLACEMENT (N/A)  APPLICATION, WOUND VAC (N/A)    Post-Operative Day: 7 Days Post-Op    Objective:     Vital Signs (Most Recent):  Temp: 99.8 °F (37.7 °C) (09/25/20 0300)  Pulse: (!) 113 (09/25/20 0812)  Resp: (!) 29 (09/25/20 0812)  BP: (!) 149/65 (09/25/20 0700)  SpO2: 95 % (09/25/20 0812) Vital Signs (24h Range):  Temp:  [99.8 °F (37.7 °C)-100.9 °F (38.3 °C)] 99.8 °F (37.7 °C)  Pulse:  [] 113  Resp:  [21-35] 29  SpO2:  [88 %-100 %] 95 %  BP: (132-182)/() 149/65     Weight: 67.6 kg (149 lb)  Body mass index is 30.09 kg/m².      Intake/Output Summary (Last 24 hours) at 9/25/2020 0845  Last data filed at 9/25/2020 0500  Gross per 24 hour   Intake 2230 ml   Output 2458 ml   Net -228 ml       Physical Exam  Vitals signs and nursing note reviewed.   Constitutional:       General: She is not in acute distress.  HENT:      Head: Normocephalic and atraumatic.      Nose:      Comments: NG tube in place     Mouth/Throat:      Mouth: Mucous membranes are dry.   Eyes:      General: No scleral icterus.     Extraocular Movements: Extraocular movements intact.      Comments: Left pupil non-reactive to light.  EOM intact     Neck:      Comments: RIJ central line  Cardiovascular:      Rate and Rhythm: Regular rhythm. Tachycardia present.   Pulmonary:      Breath sounds: Rhonchi: bilateral.      Comments: Moderate inspiratory effort noted.  Abdominal:      General: There is distension.      Palpations: Abdomen is soft.      Comments: Midline incision with wound vac in place;  excellent seal to wound vac.   L abd drains in place with purulent; G tube to gravity.   Abdomen more tense to palpation in comparison to previous exam.     Genitourinary:     Comments: L groin with improving ecchymosis  Bhardwaj in place  Musculoskeletal:      Comments: LUE weakness noted.   Skin:     General: Skin is warm and dry.   Neurological:      Mental Status: She is alert.      Cranial Nerves: No cranial nerve deficit.      Sensory: No sensory deficit.      Motor: Weakness present.      Comments: LUE weakness             Lines/Drains/Airways     Central Venous Catheter Line            Percutaneous Central Line Insertion/Assessment - Triple Lumen  09/04/20 0800 right internal jugular 21 days          Drain                 Urethral Catheter 09/14/20 1330 10 days         Closed/Suction Drain 09/18/20 1555 Left Abdomen Bulb 19 Fr. 6 days         Closed/Suction Drain 09/18/20 1559 Left Abdomen Bulb 19 Fr. 6 days         Gastrostomy/Enterostomy 09/18/20 1612 Other (see comments) LUQ decompression;feeding 6 days         NG/OG Tube 09/18/20 1500 Right nostril 6 days          Peripheral Intravenous Line                 Peripheral IV - Single Lumen 09/20/20 1017 22 G Posterior;Right Hand 4 days                Significant Labs:    CBC/Anemia Profile:  Recent Labs   Lab 09/24/20  0300 09/25/20  0300   WBC 13.40* 18.46*   HGB 7.7* 7.5*   HCT 24.7* 24.6*    229   MCV 90 93   RDW 16.6* 16.7*        Chemistries:  Recent Labs   Lab 09/23/20  1200 09/24/20  0300 09/25/20  0300    145 143   K 4.2 3.5 3.9   * 113* 111*   CO2 19* 20* 21*   BUN 40* 40* 48*   CREATININE 1.0 1.1 1.1   CALCIUM 8.2* 8.1* 8.1*   ALBUMIN 1.1* 1.2* 1.2*   PROT 5.9* 6.1 6.4   BILITOT 2.9* 2.8* 2.6*   ALKPHOS 192* 190* 208*   ALT 25 24 23   AST 27 28 38   MG  --  2.0 2.1   PHOS  --  3.9 4.5       All pertinent labs within the past 24 hours have been reviewed.    Significant Imaging:  I have reviewed all pertinent imaging results/findings  within the past 24 hours.

## 2020-09-25 NOTE — PT/OT/SLP PROGRESS
Occupational Therapy      Patient Name:  Elda Hernandez   MRN:  [id number]    Patient not seen today secondary to pt to receive CT of abdomen and not medically stable for therapy today. Will follow-up next OT visit.    PAULINE Gomez  9/25/2020

## 2020-09-25 NOTE — EICU
Rounding (Video Assessment):  Yes    Intervention Initiated From:  Bedside    Gayatri Communicated with Bedside Nurse regarding:  Respiratory    Nurse Notified:  Yes    Doctor Notified:  Yes    Comments: Called for emergent intubation. Privileges verified on Dr. Pino he will supervise Dr. Jakob Gutierrez  1210 % used in preparation of intubation. Dr. Bradley in room  1216 Propofol 50 mg IVP followed by Rocuronium 100 mg IVP. Cont with % Sa02 97%  1217 Inserted Mac 3 glide scope inserted ETT 8.0  . Color change Bilateral breathe sound.   1218 Ryan 200 mcg IVP  1220 Bronchoscopy started  1221 Ryan 400 mcg IVP  1223 Ryan 400 mcg IVP B/P 80/44 (58(  P 125 R 16, Sa02 100%  1224 Irrigated bronch, then suctioned. B/P 125/64 (83) P 123 R 16, 100% sa02  1227 120/56 (81) P 122, R 14 Sa02 100%  1228 Completed Bronchoscopy. Pt tolerated well   1234 Dr. Khoa Damon present to insert arterial line. US used as guidance on right femoral artery.(Privileges verified on Dr. Maurice)  Dr. Bradley supervising procedure.  1236 right groin prepped with Chlora prep  1237 Sterile blue towels applied around area  1241  US  Used as guidance to locate right femoral artery.Iinserted. 20 gauze angiocath. Blood return.   1242 Angiocath unable to advance. Removed, pressure held.  1245 2nd angio cath inserted with US guidance. Unable to advance. Removed, pressure held.   1254 3rd attempt using US guidance to locate femoral artery  1302 Dr. Pino assisting with proceudre. US used as guidance. Angio cath. 20 gauze blood return noted. Glide wire inserted into cannulaWire and cannula removed. Press held   1311 Inserted needle, then glide wire. Needle removed. Advanced cannula, unable to advance. Removed pressure held.  1314 Dr. Bradley present. Procedure aborted.

## 2020-09-25 NOTE — SUBJECTIVE & OBJECTIVE
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    Arnold-Chiari malformation     Depression     Esophageal stricture     Essential tremor     Hypertension     Left bundle branch block     Liver fibrosis, transplanted liver 10/2/2018    Suggested on fibroscan 10/2/18    Migraine without aura     MVP (mitral valve prolapse)     Non-rheumatic mitral regurgitation 10/1/2018    Non-rheumatic tricuspid valve insufficiency 10/1/2018    Osteoporosis     Recurrent urinary tract infection     Seizures     Shingles 2007    SIADH (syndrome of inappropriate ADH production)     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    Tricuspid valve prolapse     Urolithiasis     Von Gierke disease     s/p liver transplant     Past Surgical History:   Procedure Laterality Date    APPENDECTOMY  6/22/2007    APPLICATION OF WOUND VACUUM-ASSISTED CLOSURE DEVICE N/A 9/18/2020    Procedure: APPLICATION, WOUND VAC;  Surgeon: Zain Decker MD;  Location: [address];  Service: General;  Laterality: N/A;    COLONOSCOPY  5/13/2008    internal hemorrhoids    CRANIOTOMY      ESOPHAGOGASTRODUODENOSCOPY N/A 9/3/2020    Procedure: EGD (ESOPHAGOGASTRODUODENOSCOPY);  Surgeon: Tyrel Vergara MD;  Location: [facility];  Service: Endoscopy;  Laterality: N/A;    LAMINECTOMY  3/2001    LIVER TRANSPLANT  9/23/2002    OSSICULAR RECONSTRUCTION  10/4/1995    RIGHT REPLACEMENT PROSTHESIS for cholesteatoma    THYMECTOMY  5/2/2007    TONSILLECTOMY, ADENOIDECTOMY  1/21/2004    TOTAL ABDOMINAL HYSTERECTOMY  3/31/1994     History reviewed. No pertinent family history.  Social History     Tobacco Use    Smoking status: Never Smoker   Substance Use Topics    Alcohol use: No    Drug use: No     Review of patient's allergies indicates:   Allergen Reactions    Codeine Itching     Other reaction(s): Itching    Lipitor [atorvastatin] Other (See Comments)     Other reaction(s): Muscle pain  Muscle  cranmps    Morphine Itching     Other reaction(s): nausea and vomiting     Zoloft [sertraline] Other (See Comments)     Tremors/muscle spasms       Medications: I have reviewed the current medication administration record.    Medications Prior to Admission   Medication Sig Dispense Refill Last Dose    mycophenolate (CELLCEPT) 250 mg Cap Take 2 capsules (500 mg total) by mouth 2 (two) times daily. 360 capsule 6     tacrolimus (PROGRAF) 1 MG Cap TAKE 2 CAPSULES BY MOUTH EVERY MORNING AND 1 CAPSULE EVERY EVENING 90 capsule 11     calcium carbonate (OS-JASON) 600 mg (1,500 mg) Tab Take 600 mg by mouth 2 (two) times daily with meals.       demeclocycline (DECLOMYCIN) 150 MG Tab Take 150 mg by mouth every 12 (twelve) hours.       DENAVIR 1 % cream RICHAR EXT AA Q 2 H FOR 4 DAYS  0     levothyroxine (SYNTHROID) 75 MCG tablet Take 75 mcg by mouth once daily.       lifitegrast (XIIDRA) 5 % Dpet Place 1 drop into both eyes 2 (two) times daily. (Patient not taking: Reported on 3/3/2020) 60 each 11     magnesium oxide (MAG-OX) 400 mg (241.3 mg magnesium) tablet TK 2 TS PO BID  5     multivitamin capsule Take 1 capsule by mouth once daily.       potassium chloride (KLOR-CON) 10 MEQ TbSR Take 4 tablets (40 mEq total) by mouth once daily. 120 tablet 3     promethazine (PHENERGAN) 25 MG tablet Take 25 mg by mouth every 4 (four) hours as needed (if unrelieved by zofran).   5     triamcinolone acetonide 0.1% (KENALOG) 0.1 % cream Apply topically 2 (two) times daily. (Patient not taking: Reported on 3/3/2020) 454 g 1        Review of Systems   Constitutional: Positive for diaphoresis. Negative for appetite change.   HENT: Negative for congestion.    Eyes: Negative for discharge.   Respiratory: Negative for shortness of breath.    Cardiovascular: Negative for chest pain.   Gastrointestinal: Negative for abdominal pain.   Endocrine: Negative for cold intolerance.   Genitourinary: Negative for difficulty urinating.    Musculoskeletal: Negative for joint swelling.   Skin: Negative for color change.     Objective:     Vital Signs (Most Recent):  Temp: 99.8 °F (37.7 °C) (09/25/20 0300)  Pulse: (!) 113 (09/25/20 0812)  Resp: (!) 29 (09/25/20 0812)  BP: (!) 149/65 (09/25/20 0700)  SpO2: 95 % (09/25/20 0812)    Vital Signs Range (Last 24H):  Temp:  [99.8 °F (37.7 °C)-100.9 °F (38.3 °C)]   Pulse:  []   Resp:  [21-35]   BP: (132-182)/()   SpO2:  [88 %-100 %]     Physical Exam  Constitutional:       General: She is not in acute distress.  HENT:      Head: Normocephalic.      Right Ear: External ear normal.      Left Ear: External ear normal.   Eyes:      General: Scleral icterus present.         Left eye: Discharge present.     Comments: Conjunctival injection w/ palpebral injection on left eye     Neck:      Musculoskeletal: Normal range of motion.   Pulmonary:      Effort: Pulmonary effort is normal.      Breath sounds: No stridor.   Abdominal:      Tenderness: There is no abdominal tenderness.   Skin:     General: Skin is dry.      Findings: No rash.         Neurological Exam:   LOC: alert  Attention Span: Good   Language: No aphasia, no verbal output (baseline) but able to follow simple one step commands and tracks examiner  Articulation: Mute/Anarthric  Visual Fields: Full  on BTT bilaterally in each globe individually  EOM (CN III, IV, VI): Full/intact  Pupils (CN II, III): Anisocoria Side: L pupil 6 mm, sluggishly reactive; no APD noted mm Reactive: Yes Sluggish  Facial Movement (CN VII): Symmetric facial expression    Motor: Arm left  Paresis: 4/5  Leg left  Paresis: 2/5  Arm right  Paresis: 4/5  Leg right Paresis: 2/5  Sensation: intact to touch bilaterally      Laboratory:  CMP:   Recent Labs   Lab 09/25/20 0300   CALCIUM 8.1*   ALBUMIN 1.2*   PROT 6.4      K 3.9   CO2 21*   *   BUN 48*   CREATININE 1.1   ALKPHOS 208*   ALT 23   AST 38   BILITOT 2.6*     CBC:   Recent Labs   Lab 09/25/20  0300   WBC  18.46*   RBC 2.65*   HGB 7.5*   HCT 24.6*      MCV 93   MCH 28.3   MCHC 30.5*     Lipid Panel:   Recent Labs   Lab 09/22/20  0324   TRIG 197*  197*     Coagulation:   Recent Labs   Lab 09/21/20  0300   INR 1.1   APTT 31.9     Hgb A1C: No results for input(s): HGBA1C in the last 168 hours.  TSH: No results for input(s): TSH in the last 168 hours.    Diagnostic Results:      Brain imaging:  CTH - no clear defined infarct or early ischemic change or hemorrhage    Vessel Imaging:  None    Cardiac Evaluation:   None

## 2020-09-25 NOTE — PROCEDURES
"Elda Hernandez is a 50 y.o. female patient.    Temp: (!) 100.4 °F (38 °C) (09/25/20 1145)  Pulse: (!) 122 (09/25/20 1300)  Resp: 18 (09/25/20 1300)  BP: 129/72 (09/25/20 1300)  SpO2: 96 % (09/25/20 1300)  Weight: 67.6 kg (149 lb) (09/18/20 1854)  Height: 4' 11" (149.9 cm) (09/22/20 8979)       Arterial Line    Date/Time: 9/25/2020 2:22 PM  Location procedure was performed: [facility] CRITICAL CARE SURGERY  Performed by: Carl Pino MD  Authorized by: Kwabena Bradley MD   Pre-op Diagnosis: sepsis  Post-operative diagnosis: sepsis   Consent Done: Emergent Situation  Preparation: Patient was prepped and draped in the usual sterile fashion.  Indications: multiple ABGs, respiratory failure and hemodynamic monitoring  Location: left femoral  Needle gauge: 20  Seldinger technique: Seldinger technique used  Number of attempts: 5 or more  Post-procedure: line sutured and dressing applied  Post-procedure CMS: normal  Patient tolerance: Patient tolerated the procedure well with no immediate complications  Comments: 4 attempts at right femoral, unable to advance guidewire after blood return through needle. 1 attempt at left femoral          Carl Pino  9/25/2020  "

## 2020-09-25 NOTE — PROCEDURES
"Elda Hernandez is a 50 y.o. female patient.    Temp: (!) 100.4 °F (38 °C) (09/25/20 1145)  Pulse: (!) 123 (09/25/20 1230)  Resp: 18 (09/25/20 1230)  BP: (!) 120/56 (09/25/20 1230)  SpO2: 99 % (09/25/20 1230)  Weight: 67.6 kg (149 lb) (09/18/20 1854)  Height: 4' 11" (149.9 cm) (09/22/20 6709)       Intubation    Date/Time: 9/25/2020 12:35 PM  Location procedure was performed: [facility] CRITICAL CARE SURGERY  Performed by: Jakob Gutierrez MD  Authorized by: Kwabena Bradley MD   Pre-operative diagnosis: sepsis  Post-operative diagnosis: sepsis   Consent Done: Emergent Situation  Indications: respiratory failure and  airway protection  Intubation method: video-assisted  Patient status: paralyzed (RSI)  Preoxygenation: BVM  Sedatives: propofol  Paralytic: rocuronium  Laryngoscope size: Glide 3  Tube size: 8.0 mm  Tube type: cuffed  Number of attempts: 1  Cords visualized: yes  Post-procedure assessment: CO2 detector  Breath sounds: rales/crackles  Cuff inflated: yes  ETT to lip: 21 cm  Tube secured with: ETT de la cruz  Comments: RSI w/ propofol 50mg , rocuronium 100mg. Post-induction hypotension treated with total of 1000mcg phenylephrine.           Carl Pino  9/25/2020  "

## 2020-09-25 NOTE — PLAN OF CARE
SW is following this Pt for DC planning needs.  Discharge Disposition: TBD - pending patient progress; Patient re-intubated today.    SW will continue to coordinate with patient, family, team and insurance to complete patient's discharge plan.    Tigist Moody LMSW   - Case Management

## 2020-09-25 NOTE — NURSING
Patient taken to CT attached to portable EKG monitoring and attached to O2 tank @ 15 L non-rebreather mask. O2 saturations >98%. VSS at this time. Charge RN and MD made aware of departure. Patient tolerated transfer well; however, upon arrival to the unit, patient noted to have O2 saturations in the low 90s on non-rebreather mask. Decision made to emergently intubate and perform bronchoscopy. Patient tolerated procedure well. Chest xray ordered and performed. Arterial line placed by Dr. Pino afterwards. Patient's current ventilator settings % 5 PEEP with O2 sats >97%. Family called and updated on patient's current situation and current plan of care. WCTM patient closely.

## 2020-09-25 NOTE — NURSING
Dr. Damon & Dr. Harvey at bedside assessing patient. Left pupil significantly larger & sluggish compared to  right pupil. Patient weak on right side, not able to move right arm. Able to move left upper & lower extremities & right lower toes. STAT CT ordered. Patient transported to CT by nurse, Isabelle Morataya and PCT. Patient connected to monitor & 4 L Nasal Cannula.

## 2020-09-25 NOTE — NURSING
Dr. Bradley and SICU team at the bedside to assess patient. MD made aware of patient's KERRY drain producing green, milky, thick secretions. Patient on 10 L high flow nasal cannula. STAT CT abdomen with contrast ordered. WCTM.

## 2020-09-25 NOTE — PROGRESS NOTES
[facility]  Critical Care - Surgery  Progress Note    Patient Name: Elda Hernandez  MRN: [id number]  Admission Date: 9/3/2020  Hospital Length of Stay: 22 days  Code Status: Full Code  Attending Provider: Clark Rodriguez MD  Primary Care Provider: Jordana Woods MD   Principal Problem: Perforated abdominal viscus    Subjective:     Hospital/ICU Course:  9/4: Pt admitted to SICU following ex lap for GI perf. Intubated, sedated. Wound vac in place.  Soon after arrival to unit a mottled appearance to RUE was noted.  Vascular consulted and US revealed R radial artery thrombosis.    9/5: Tachycardic..  R arm appearance greatly improved overnight.    9/6: Patient returned to OR for abdominal washout, closure. R arm with dopplerable signals to ulnar artery and palmar arch.   9/7: Attempted to wean patient off of sedation in an effort to move towards extubation. Patient weaned off of propofol. Precedex started. Patient's heart rate very labile and sensitize to sedation.   9/8: patient extubated  9/9: SHANICE. Labetalol PRN for HTN   9/10:  Diuresis increased.  Cr downtrending  9/11: Increased abdominal distension and elevated WBC count. CT abd  9/12: WBC to 45 today. Diflucan and zosyn initiated. Lactulose enema w/ improving mental status  9/13: WBC improved. Mental status improving. FWF initiated for hypernatremia.   9/14: White count continues to improve.  Oriented to person only this morning.    9/17: tolerating tube feeds, half TPN rate today. Possible step down   9/18: possible fall out of bed overnight. High G tube residual, now to gravity. Requiring comfortflo  9/19: s/p exlap. Intubated ans sedated. Weaning to extubate  9/20: No acute events overnight. Pt remains intubated, sedated. Plan to wean sedation for extubation.  9/21: Abdominal cultures grew Klebsiella; pt on zosyn  9/22: Pt did well with SBT yesterday; back on rate overnight.  Will try again today with hopes to extubate  9/23:  Extubated yesterday; doing well with nasal canula  9/24: remains on 3L NC. Diuresed yesterday w/ 240mg Lasix. Continue diuresis today with 40 Lasix BID  9/25: Blown L pupil / LUE weakness upon exam. CT head performed. CT abd/pelvis ordered.    Interval History/Significant Events:   - Blown pupil and LUE weakness upon morning exam. CT head performed. Waiting for official read. Likely MRI head to follow.  - Respiratory status in question. Increased oxygen requirements. Close intubation watch.  - Abd tense. WBC up. CT abd/pelvis to investigate possible source    Follow-up For: Procedure(s) (LRB):  LAPAROTOMY, EXPLORATORY, DRAINAGE OF ABSCESS, REPAIR OF GASTRIC PERFORATION, GASTROSTOMY TUBE PLACEMENT (N/A)  APPLICATION, WOUND VAC (N/A)    Post-Operative Day: 7 Days Post-Op    Objective:     Vital Signs (Most Recent):  Temp: 99.8 °F (37.7 °C) (09/25/20 0300)  Pulse: (!) 113 (09/25/20 0812)  Resp: (!) 29 (09/25/20 0812)  BP: (!) 149/65 (09/25/20 0700)  SpO2: 95 % (09/25/20 0812) Vital Signs (24h Range):  Temp:  [99.8 °F (37.7 °C)-100.9 °F (38.3 °C)] 99.8 °F (37.7 °C)  Pulse:  [] 113  Resp:  [21-35] 29  SpO2:  [88 %-100 %] 95 %  BP: (132-182)/() 149/65     Weight: 67.6 kg (149 lb)  Body mass index is 30.09 kg/m².      Intake/Output Summary (Last 24 hours) at 9/25/2020 0845  Last data filed at 9/25/2020 0500  Gross per 24 hour   Intake 2230 ml   Output 2458 ml   Net -228 ml       Physical Exam  Vitals signs and nursing note reviewed.   Constitutional:       General: She is not in acute distress.  HENT:      Head: Normocephalic and atraumatic.      Nose:      Comments: NG tube in place     Mouth/Throat:      Mouth: Mucous membranes are dry.   Eyes:      General: No scleral icterus.     Extraocular Movements: Extraocular movements intact.      Comments: Left pupil non-reactive to light.  EOM intact     Neck:      Comments: RIJ central line  Cardiovascular:      Rate and Rhythm: Regular rhythm. Tachycardia present.    Pulmonary:      Breath sounds: Rhonchi: bilateral.      Comments: Moderate inspiratory effort noted.  Abdominal:      General: There is distension.      Palpations: Abdomen is soft.      Comments: Midline incision with wound vac in place; excellent seal to wound vac.   L abd drains in place with purulent; G tube to gravity.   Abdomen more tense to palpation in comparison to previous exam.     Genitourinary:     Comments: L groin with improving ecchymosis  Bhardwaj in place  Musculoskeletal:      Comments: LUE weakness noted.   Skin:     General: Skin is warm and dry.   Neurological:      Mental Status: She is alert.      Cranial Nerves: No cranial nerve deficit.      Sensory: No sensory deficit.      Motor: Weakness present.      Comments: LUE weakness             Lines/Drains/Airways     Central Venous Catheter Line            Percutaneous Central Line Insertion/Assessment - Triple Lumen  09/04/20 0800 right internal jugular 21 days          Drain                 Urethral Catheter 09/14/20 1330 10 days         Closed/Suction Drain 09/18/20 1555 Left Abdomen Bulb 19 Fr. 6 days         Closed/Suction Drain 09/18/20 1559 Left Abdomen Bulb 19 Fr. 6 days         Gastrostomy/Enterostomy 09/18/20 1612 Other (see comments) LUQ decompression;feeding 6 days         NG/OG Tube 09/18/20 1500 Right nostril 6 days          Peripheral Intravenous Line                 Peripheral IV - Single Lumen 09/20/20 1017 22 G Posterior;Right Hand 4 days                Significant Labs:    CBC/Anemia Profile:  Recent Labs   Lab 09/24/20  0300 09/25/20  0300   WBC 13.40* 18.46*   HGB 7.7* 7.5*   HCT 24.7* 24.6*    229   MCV 90 93   RDW 16.6* 16.7*        Chemistries:  Recent Labs   Lab 09/23/20  1200 09/24/20  0300 09/25/20  0300    145 143   K 4.2 3.5 3.9   * 113* 111*   CO2 19* 20* 21*   BUN 40* 40* 48*   CREATININE 1.0 1.1 1.1   CALCIUM 8.2* 8.1* 8.1*   ALBUMIN 1.1* 1.2* 1.2*   PROT 5.9* 6.1 6.4   BILITOT 2.9* 2.8* 2.6*    ALKPHOS 192* 190* 208*   ALT 25 24 23   AST 27 28 38   MG  --  2.0 2.1   PHOS  --  3.9 4.5       All pertinent labs within the past 24 hours have been reviewed.    Significant Imaging:  I have reviewed all pertinent imaging results/findings within the past 24 hours.    Assessment/Plan:     * Perforated abdominal viscus  Neuro:  - sedation: none  - analgesia: PRN oxy. Dilauded PRN  - Tylenol  - haldol q6hr PRN for agitation     CV:   - HDS off of pressors  - continue to monitor    Pulm:   - Extubated 9/22  - Oxygen requirements increased acutely this morning. Now satting in the mid-90's on 10L NC O2  - Chest xray with no acute changes. Fluid overload improved.  - Close intubation watch  - Duonebs Q6 PRN     FEN/GI: Perforated stomach; s/p washout and patch (9/4); abdominal closure (9/6)  - s/p ex-lap on 9/19   - continue to hold tube feeds at this time  - NG tube, G tube, and L abdominal drains in place. Purulent output from bulb drain noted.  - G tube upsized  - Lactulose discontinued (9/14) in setting of normal ammonia level (9/10) and improving mental status  - Daily metabolic panel with PRN repletion of electrolytes  - pantoprazole for ulcer ppx  - Cont TPN     Renal:  - Continue to monitor with daily metabolic labs  - nephrology on board, appreciate recs  - Bun/Cr 40/1.1 today; continues to improve  - UOP 2L yesterday. NET -300mL yesterday  - Bhardwaj in place; monitoring I/Os  - Lasix 40 BID yesterday. No diuresis planned for today.     HEME/ID:   - Afebrile  - H/H stable. 7.5/24.6 today    - Daily CBC  - s/p Liver transplant in 2002; Daily tacrolimus level with AM labs. Per hepatology, continue tacrolimus 1mg BID  - acetaminophen PRN  - Metronidazole and Ceftriaxone  - heparin for DVT ppx  - Abdominal culture grew Klebsiella pneumonia; sensitive to zosyn  - CT Abd/pelvis for suspected abdominal source of decompensation.    Endo:  - Synthroid 40mcg daily  - insulin aspart for blood glucose control    Dispo:  -  Continue SICU care. CT abd/pelvis ordered. MRI head possible for further investigation.         Critical care was time spent personally by me on the following activities: development of treatment plan with patient or surrogate and bedside caregivers, discussions with consultants, evaluation of patient's response to treatment, examination of patient, ordering and performing treatments and interventions, ordering and review of laboratory studies, ordering and review of radiographic studies, pulse oximetry, re-evaluation of patient's condition.  This critical care time did not overlap with that of any other provider or involve time for any procedures.     Khoa Damon MD  Critical Care - Surgery  [facility]

## 2020-09-26 LAB
ABO + RH BLD: NORMAL
ALBUMIN SERPL BCP-MCNC: 1 G/DL (ref 3.5–5.2)
ALP SERPL-CCNC: 191 U/L (ref 55–135)
ALT SERPL W/O P-5'-P-CCNC: 24 U/L (ref 10–44)
ANION GAP SERPL CALC-SCNC: 8 MMOL/L (ref 8–16)
ANISOCYTOSIS BLD QL SMEAR: SLIGHT
AST SERPL-CCNC: 42 U/L (ref 10–40)
BASOPHILS # BLD AUTO: ABNORMAL K/UL (ref 0–0.2)
BASOPHILS NFR BLD: 0 % (ref 0–1.9)
BASOPHILS NFR BLD: 0 % (ref 0–1.9)
BASOPHILS NFR BLD: 1 % (ref 0–1.9)
BILIRUB SERPL-MCNC: 1.9 MG/DL (ref 0.1–1)
BLD GP AB SCN CELLS X3 SERPL QL: NORMAL
BLD PROD TYP BPU: NORMAL
BLD PROD TYP BPU: NORMAL
BLOOD UNIT EXPIRATION DATE: NORMAL
BLOOD UNIT EXPIRATION DATE: NORMAL
BLOOD UNIT TYPE CODE: 7300
BLOOD UNIT TYPE CODE: 7300
BLOOD UNIT TYPE: NORMAL
BLOOD UNIT TYPE: NORMAL
BUN SERPL-MCNC: 54 MG/DL (ref 6–20)
BURR CELLS BLD QL SMEAR: ABNORMAL
CALCIUM SERPL-MCNC: 8 MG/DL (ref 8.7–10.5)
CHLORIDE SERPL-SCNC: 109 MMOL/L (ref 95–110)
CO2 SERPL-SCNC: 23 MMOL/L (ref 23–29)
CODING SYSTEM: NORMAL
CODING SYSTEM: NORMAL
CREAT SERPL-MCNC: 1 MG/DL (ref 0.5–1.4)
DIFFERENTIAL METHOD: ABNORMAL
DISPENSE STATUS: NORMAL
DISPENSE STATUS: NORMAL
EOSINOPHIL # BLD AUTO: ABNORMAL K/UL (ref 0–0.5)
EOSINOPHIL NFR BLD: 0 % (ref 0–8)
EOSINOPHIL NFR BLD: 1 % (ref 0–8)
EOSINOPHIL NFR BLD: 3 % (ref 0–8)
ERYTHROCYTE [DISTWIDTH] IN BLOOD BY AUTOMATED COUNT: 15.5 % (ref 11.5–14.5)
ERYTHROCYTE [DISTWIDTH] IN BLOOD BY AUTOMATED COUNT: 15.9 % (ref 11.5–14.5)
ERYTHROCYTE [DISTWIDTH] IN BLOOD BY AUTOMATED COUNT: 16.7 % (ref 11.5–14.5)
EST. GFR  (AFRICAN AMERICAN): >60 ML/MIN/1.73 M^2
EST. GFR  (NON AFRICAN AMERICAN): >60 ML/MIN/1.73 M^2
GIANT PLATELETS BLD QL SMEAR: PRESENT
GLUCOSE SERPL-MCNC: 110 MG/DL (ref 70–110)
HCT VFR BLD AUTO: 20.6 % (ref 37–48.5)
HCT VFR BLD AUTO: 27.1 % (ref 37–48.5)
HCT VFR BLD AUTO: 27.3 % (ref 37–48.5)
HGB BLD-MCNC: 6 G/DL (ref 12–16)
HGB BLD-MCNC: 8.5 G/DL (ref 12–16)
HGB BLD-MCNC: 8.7 G/DL (ref 12–16)
HYPOCHROMIA BLD QL SMEAR: ABNORMAL
IMM GRANULOCYTES # BLD AUTO: ABNORMAL K/UL (ref 0–0.04)
IMM GRANULOCYTES NFR BLD AUTO: ABNORMAL % (ref 0–0.5)
LYMPHOCYTES # BLD AUTO: ABNORMAL K/UL (ref 1–4.8)
LYMPHOCYTES NFR BLD: 2 % (ref 18–48)
LYMPHOCYTES NFR BLD: 5 % (ref 18–48)
LYMPHOCYTES NFR BLD: 7 % (ref 18–48)
MCH RBC QN AUTO: 27.9 PG (ref 27–31)
MCH RBC QN AUTO: 28.2 PG (ref 27–31)
MCH RBC QN AUTO: 28.8 PG (ref 27–31)
MCHC RBC AUTO-ENTMCNC: 29.1 G/DL (ref 32–36)
MCHC RBC AUTO-ENTMCNC: 31.4 G/DL (ref 32–36)
MCHC RBC AUTO-ENTMCNC: 31.9 G/DL (ref 32–36)
MCV RBC AUTO: 89 FL (ref 82–98)
MCV RBC AUTO: 92 FL (ref 82–98)
MCV RBC AUTO: 96 FL (ref 82–98)
METAMYELOCYTES NFR BLD MANUAL: 1 %
METAMYELOCYTES NFR BLD MANUAL: 2 %
MONOCYTES # BLD AUTO: ABNORMAL K/UL (ref 0.3–1)
MONOCYTES NFR BLD: 3 % (ref 4–15)
MONOCYTES NFR BLD: 6 % (ref 4–15)
MONOCYTES NFR BLD: 8 % (ref 4–15)
MYELOCYTES NFR BLD MANUAL: 1 %
NEUTROPHILS NFR BLD: 75 % (ref 38–73)
NEUTROPHILS NFR BLD: 89 % (ref 38–73)
NEUTROPHILS NFR BLD: 93 % (ref 38–73)
NEUTS BAND NFR BLD MANUAL: 3 %
NRBC BLD-RTO: 0 /100 WBC
NRBC BLD-RTO: 1 /100 WBC
NRBC BLD-RTO: 1 /100 WBC
NUM UNITS TRANS PACKED RBC: NORMAL
NUM UNITS TRANS PACKED RBC: NORMAL
OVALOCYTES BLD QL SMEAR: ABNORMAL
PLATELET # BLD AUTO: 167 K/UL (ref 150–350)
PLATELET # BLD AUTO: 170 K/UL (ref 150–350)
PLATELET # BLD AUTO: 205 K/UL (ref 150–350)
PLATELET BLD QL SMEAR: ABNORMAL
PMV BLD AUTO: 12.1 FL (ref 9.2–12.9)
PMV BLD AUTO: 12.2 FL (ref 9.2–12.9)
PMV BLD AUTO: 12.3 FL (ref 9.2–12.9)
POCT GLUCOSE: 128 MG/DL (ref 70–110)
POCT GLUCOSE: 130 MG/DL (ref 70–110)
POCT GLUCOSE: 133 MG/DL (ref 70–110)
POIKILOCYTOSIS BLD QL SMEAR: SLIGHT
POLYCHROMASIA BLD QL SMEAR: ABNORMAL
POTASSIUM SERPL-SCNC: 3.7 MMOL/L (ref 3.5–5.1)
PROT SERPL-MCNC: 5.4 G/DL (ref 6–8.4)
RBC # BLD AUTO: 2.15 M/UL (ref 4–5.4)
RBC # BLD AUTO: 2.95 M/UL (ref 4–5.4)
RBC # BLD AUTO: 3.08 M/UL (ref 4–5.4)
SODIUM SERPL-SCNC: 140 MMOL/L (ref 136–145)
TACROLIMUS BLD-MCNC: <1.5 NG/ML (ref 5–15)
TOXIC GRANULES BLD QL SMEAR: PRESENT
WBC # BLD AUTO: 11.46 K/UL (ref 3.9–12.7)
WBC # BLD AUTO: 14.3 K/UL (ref 3.9–12.7)
WBC # BLD AUTO: 16.62 K/UL (ref 3.9–12.7)

## 2020-09-26 PROCEDURE — 27000221 HC OXYGEN, UP TO 24 HOURS

## 2020-09-26 PROCEDURE — 63600175 PHARM REV CODE 636 W HCPCS: Performed by: SURGERY

## 2020-09-26 PROCEDURE — 63600175 PHARM REV CODE 636 W HCPCS: Performed by: STUDENT IN AN ORGANIZED HEALTH CARE EDUCATION/TRAINING PROGRAM

## 2020-09-26 PROCEDURE — 25000003 PHARM REV CODE 250: Performed by: STUDENT IN AN ORGANIZED HEALTH CARE EDUCATION/TRAINING PROGRAM

## 2020-09-26 PROCEDURE — P9016 RBC LEUKOCYTES REDUCED: HCPCS

## 2020-09-26 PROCEDURE — 25000003 PHARM REV CODE 250: Performed by: SURGERY

## 2020-09-26 PROCEDURE — A4217 STERILE WATER/SALINE, 500 ML: HCPCS | Performed by: STUDENT IN AN ORGANIZED HEALTH CARE EDUCATION/TRAINING PROGRAM

## 2020-09-26 PROCEDURE — 25000003 PHARM REV CODE 250: Performed by: INTERNAL MEDICINE

## 2020-09-26 PROCEDURE — 99900035 HC TECH TIME PER 15 MIN (STAT)

## 2020-09-26 PROCEDURE — 94668 MNPJ CHEST WALL SBSQ: CPT

## 2020-09-26 PROCEDURE — 99223 1ST HOSP IP/OBS HIGH 75: CPT | Mod: ,,, | Performed by: INTERNAL MEDICINE

## 2020-09-26 PROCEDURE — 86920 COMPATIBILITY TEST SPIN: CPT

## 2020-09-26 PROCEDURE — 85027 COMPLETE CBC AUTOMATED: CPT

## 2020-09-26 PROCEDURE — C9113 INJ PANTOPRAZOLE SODIUM, VIA: HCPCS | Performed by: STUDENT IN AN ORGANIZED HEALTH CARE EDUCATION/TRAINING PROGRAM

## 2020-09-26 PROCEDURE — 85007 BL SMEAR W/DIFF WBC COUNT: CPT

## 2020-09-26 PROCEDURE — 94761 N-INVAS EAR/PLS OXIMETRY MLT: CPT

## 2020-09-26 PROCEDURE — 27200966 HC CLOSED SUCTION SYSTEM

## 2020-09-26 PROCEDURE — 63600175 PHARM REV CODE 636 W HCPCS: Performed by: INTERNAL MEDICINE

## 2020-09-26 PROCEDURE — 36600 WITHDRAWAL OF ARTERIAL BLOOD: CPT

## 2020-09-26 PROCEDURE — 20000000 HC ICU ROOM

## 2020-09-26 PROCEDURE — 80197 ASSAY OF TACROLIMUS: CPT

## 2020-09-26 PROCEDURE — 25000003 PHARM REV CODE 250

## 2020-09-26 PROCEDURE — 80053 COMPREHEN METABOLIC PANEL: CPT

## 2020-09-26 PROCEDURE — 99900026 HC AIRWAY MAINTENANCE (STAT)

## 2020-09-26 PROCEDURE — 82803 BLOOD GASES ANY COMBINATION: CPT

## 2020-09-26 PROCEDURE — 36415 COLL VENOUS BLD VENIPUNCTURE: CPT

## 2020-09-26 PROCEDURE — 86850 RBC ANTIBODY SCREEN: CPT

## 2020-09-26 PROCEDURE — 94003 VENT MGMT INPAT SUBQ DAY: CPT

## 2020-09-26 PROCEDURE — 99223 PR INITIAL HOSPITAL CARE,LEVL III: ICD-10-PCS | Mod: ,,, | Performed by: INTERNAL MEDICINE

## 2020-09-26 RX ORDER — NOREPINEPHRINE BITARTRATE/D5W 4MG/250ML
0.02 PLASTIC BAG, INJECTION (ML) INTRAVENOUS CONTINUOUS
Status: DISCONTINUED | OUTPATIENT
Start: 2020-09-26 | End: 2020-09-30

## 2020-09-26 RX ORDER — DEXMEDETOMIDINE HYDROCHLORIDE 4 UG/ML
INJECTION, SOLUTION INTRAVENOUS
Status: COMPLETED
Start: 2020-09-26 | End: 2020-09-26

## 2020-09-26 RX ORDER — HYDROCODONE BITARTRATE AND ACETAMINOPHEN 500; 5 MG/1; MG/1
TABLET ORAL
Status: DISCONTINUED | OUTPATIENT
Start: 2020-09-26 | End: 2020-09-28

## 2020-09-26 RX ORDER — VANCOMYCIN HCL IN 5 % DEXTROSE 1G/250ML
15 PLASTIC BAG, INJECTION (ML) INTRAVENOUS
Status: DISCONTINUED | OUTPATIENT
Start: 2020-09-26 | End: 2020-09-27

## 2020-09-26 RX ORDER — DEXMEDETOMIDINE HYDROCHLORIDE 4 UG/ML
0.2 INJECTION, SOLUTION INTRAVENOUS CONTINUOUS
Status: DISCONTINUED | OUTPATIENT
Start: 2020-09-26 | End: 2020-09-28

## 2020-09-26 RX ORDER — SODIUM CHLORIDE, SODIUM LACTATE, POTASSIUM CHLORIDE, CALCIUM CHLORIDE 600; 310; 30; 20 MG/100ML; MG/100ML; MG/100ML; MG/100ML
INJECTION, SOLUTION INTRAVENOUS CONTINUOUS
Status: DISCONTINUED | OUTPATIENT
Start: 2020-09-26 | End: 2020-09-29

## 2020-09-26 RX ADMIN — MAGNESIUM SULFATE HEPTAHYDRATE: 500 INJECTION, SOLUTION INTRAMUSCULAR; INTRAVENOUS at 10:09

## 2020-09-26 RX ADMIN — VANCOMYCIN HYDROCHLORIDE 1000 MG: 1 INJECTION, POWDER, LYOPHILIZED, FOR SOLUTION INTRAVENOUS at 09:09

## 2020-09-26 RX ADMIN — HEPARIN SODIUM 5000 UNITS: 5000 INJECTION INTRAVENOUS; SUBCUTANEOUS at 03:09

## 2020-09-26 RX ADMIN — MEROPENEM 2 G: 1 INJECTION, POWDER, FOR SOLUTION INTRAVENOUS at 12:09

## 2020-09-26 RX ADMIN — HEPARIN SODIUM 5000 UNITS: 5000 INJECTION INTRAVENOUS; SUBCUTANEOUS at 10:09

## 2020-09-26 RX ADMIN — DEXMEDETOMIDINE HYDROCHLORIDE 1 MCG/KG/HR: 4 INJECTION, SOLUTION INTRAVENOUS at 01:09

## 2020-09-26 RX ADMIN — HEPARIN SODIUM 5000 UNITS: 5000 INJECTION INTRAVENOUS; SUBCUTANEOUS at 06:09

## 2020-09-26 RX ADMIN — SODIUM CHLORIDE, SODIUM LACTATE, POTASSIUM CHLORIDE, AND CALCIUM CHLORIDE: .6; .31; .03; .02 INJECTION, SOLUTION INTRAVENOUS at 08:09

## 2020-09-26 RX ADMIN — SODIUM CHLORIDE, SODIUM LACTATE, POTASSIUM CHLORIDE, AND CALCIUM CHLORIDE 1000 ML: .6; .31; .03; .02 INJECTION, SOLUTION INTRAVENOUS at 08:09

## 2020-09-26 RX ADMIN — VANCOMYCIN HYDROCHLORIDE 1000 MG: 1 INJECTION, POWDER, LYOPHILIZED, FOR SOLUTION INTRAVENOUS at 10:09

## 2020-09-26 RX ADMIN — POTASSIUM CHLORIDE 40 MEQ: 29.8 INJECTION, SOLUTION INTRAVENOUS at 10:09

## 2020-09-26 RX ADMIN — MEROPENEM 2 G: 1 INJECTION, POWDER, FOR SOLUTION INTRAVENOUS at 10:09

## 2020-09-26 RX ADMIN — DEXMEDETOMIDINE HYDROCHLORIDE 1 MCG/KG/HR: 4 INJECTION, SOLUTION INTRAVENOUS at 10:09

## 2020-09-26 RX ADMIN — TACROLIMUS 0.5 MG: 0.5 CAPSULE ORAL at 10:09

## 2020-09-26 RX ADMIN — MAGNESIUM SULFATE HEPTAHYDRATE: 500 INJECTION, SOLUTION INTRAMUSCULAR; INTRAVENOUS at 08:09

## 2020-09-26 RX ADMIN — LEVOTHYROXINE SODIUM ANHYDROUS 40 MCG: 100 INJECTION, POWDER, LYOPHILIZED, FOR SOLUTION INTRAVENOUS at 10:09

## 2020-09-26 RX ADMIN — DEXMEDETOMIDINE HYDROCHLORIDE 0.2 MCG/KG/HR: 4 INJECTION, SOLUTION INTRAVENOUS at 09:09

## 2020-09-26 RX ADMIN — DEXMEDETOMIDINE HYDROCHLORIDE 0.5 MCG/KG/HR: 4 INJECTION, SOLUTION INTRAVENOUS at 07:09

## 2020-09-26 RX ADMIN — MICAFUNGIN SODIUM 100 MG: 20 INJECTION, POWDER, LYOPHILIZED, FOR SOLUTION INTRAVENOUS at 10:09

## 2020-09-26 RX ADMIN — PROPOFOL 20 MCG/KG/MIN: 10 INJECTION, EMULSION INTRAVENOUS at 07:09

## 2020-09-26 RX ADMIN — PANTOPRAZOLE SODIUM 40 MG: 40 INJECTION, POWDER, LYOPHILIZED, FOR SOLUTION INTRAVENOUS at 10:09

## 2020-09-26 RX ADMIN — PROPOFOL 10 MCG/KG/MIN: 10 INJECTION, EMULSION INTRAVENOUS at 11:09

## 2020-09-26 RX ADMIN — HYDROMORPHONE HYDROCHLORIDE 0.5 MG: 1 INJECTION, SOLUTION INTRAMUSCULAR; INTRAVENOUS; SUBCUTANEOUS at 11:09

## 2020-09-26 RX ADMIN — TACROLIMUS 0.5 MG: 0.5 CAPSULE ORAL at 05:09

## 2020-09-26 RX ADMIN — SODIUM CHLORIDE, SODIUM LACTATE, POTASSIUM CHLORIDE, AND CALCIUM CHLORIDE 1000 ML: .6; .31; .03; .02 INJECTION, SOLUTION INTRAVENOUS at 04:09

## 2020-09-26 NOTE — PROGRESS NOTES
[facility]  General Surgery  Progress Note    Subjective:     History of Present Illness:  Ms. Hernandez is a 49yo F w/PMH of von Gierke disease s/p liver transplant (2002, on tacro + MMF, follows Dr. Nielsen), hx chronic rejection (bx 2015 with acute and chronic rejection s/p steroids), biliary stricture and ductopenic rejection, recently with cirrhosis on fibroscan (10/2019), HTN, and depression who presents as a transfer for further evaluation of gastric outlet obstruction and esophageal stricturing.  Patient decompensated requiring multiple pressors and intubation. CT scan showed free air. Prior to intubation patient reported severe abdominal pain.   states that patient has had epigastric pain, nausea, and vomiting for several days.     Post-Op Info:  Procedure(s) (LRB):  LAPAROTOMY, EXPLORATORY, DRAINAGE OF ABSCESS, REPAIR OF GASTRIC PERFORATION, GASTROSTOMY TUBE PLACEMENT (N/A)  APPLICATION, WOUND VAC (N/A)   8 Days Post-Op     Interval History: Urgently reintubated yesterday for hypoxia. CT scan showed gross leakage of PO contrast through gastric anastomosis, coming out of the elian drain placed near. Drop in Hb this morning after attempted femoral chloé yesterday. [place] no longer working - no good place to put another.    Medications:  Continuous Infusions:   dexmedetomidine (PRECEDEX) infusion 1 mcg/kg/hr (09/26/20 1031)    lactated ringers 125 mL/hr at 09/26/20 0900    norepinephrine bitartrate-D5W      propofoL 20 mcg/kg/min (09/26/20 0733)    TPN ADULT CENTRAL LINE CUSTOM 60 mL/hr at 09/26/20 0900    TPN ADULT CENTRAL LINE CUSTOM       Scheduled Meds:   furosemide (LASIX) IV  40 mg Intravenous Q12H    heparin (porcine)  5,000 Units Subcutaneous Q8H    levothyroxine  40 mcg Intravenous Daily    meropenem (MERREM) IVPB  2 g Intravenous Q8H    micafungin (MYCAMINE) IVPB  100 mg Intravenous Q24H    pantoprazole  40 mg Intravenous Daily    tacrolimus  0.5 mg Sublingual  BID    vancomycin (VANCOCIN) IVPB  15 mg/kg Intravenous Q12H     PRN Meds:sodium chloride, sodium chloride, acetaminophen, albuterol-ipratropium, calcium gluconate IVPB, calcium gluconate IVPB, calcium gluconate IVPB, dextrose 50%, dextrose 50%, glucagon (human recombinant), glucose, glucose, haloperidol lactate, HYDROmorphone, insulin aspart U-100, iohexol, labetaloL, magnesium sulfate IVPB, magnesium sulfate IVPB, ondansetron, potassium chloride in water **AND** potassium chloride in water **AND** potassium chloride in water, promethazine, sodium chloride 0.9%, sodium chloride 0.9%, sodium phosphate IVPB, sodium phosphate IVPB, sodium phosphate IVPB, Pharmacy to dose Vancomycin consult **AND** vancomycin - pharmacy to dose     Review of patient's allergies indicates:   Allergen Reactions    Codeine Itching     Other reaction(s): Itching    Lipitor [atorvastatin] Other (See Comments)     Other reaction(s): Muscle pain  Muscle cranmps    Morphine Itching     Other reaction(s): nausea and vomiting     Zoloft [sertraline] Other (See Comments)     Tremors/muscle spasms     Objective:     Vital Signs (Most Recent):  Temp: 98.5 °F (36.9 °C) (09/26/20 0950)  Pulse: 93 (09/26/20 1030)  Resp: 20 (09/26/20 1030)  BP: (!) 146/65 (09/26/20 1015)  SpO2: 100 % (09/26/20 1030) Vital Signs (24h Range):  Temp:  [98.4 °F (36.9 °C)-100.5 °F (38.1 °C)] 98.5 °F (36.9 °C)  Pulse:  [] 93  Resp:  [12-35] 20  SpO2:  [93 %-100 %] 100 %  BP: ()/() 146/65  Arterial Line BP: ()/(26-86) 52/42     Weight: 67.6 kg (149 lb)  Body mass index is 30.09 kg/m².    Intake/Output - Last 3 Shifts       09/24 0700 - 09/25 0659 09/25 0700 - 09/26 0659 09/26 0700 - 09/27 0659    I.V. (mL/kg) 501 (7.4) 666.4 (9.9)     Blood   350    IV Piggyback  800     TPN 1729 1454     Total Intake(mL/kg) 2230 (33) 2920.4 (43.2) 350 (5.2)    Urine (mL/kg/hr) 2140 (1.3) 1940 (1.2) 230 (0.9)    Drains 543 815     Other 0 50     Stool 0      Total  Output 2683 2805 230    Net -453 +115.4 +120           Stool Occurrence 1 x            Physical Exam  Vitals signs and nursing note reviewed.   Constitutional:       Appearance: She is ill-appearing.   HENT:      Head: Normocephalic and atraumatic.      Nose:      Comments: NG tube in place     Mouth/Throat:      Mouth: Mucous membranes are dry.      Comments: Endotracheal tube in place  Eyes:      General: No scleral icterus.  Neck:      Comments: RIJ central line  Cardiovascular:      Rate and Rhythm: Normal rate and regular rhythm.      Heart sounds: Normal heart sounds.   Pulmonary:      Breath sounds: Rhonchi: bilateral.      Comments: Mechanically ventilated.  Abdominal:      General: There is distension.      Palpations: Abdomen is soft.      Comments: Midline incision with wound vac in place; excellent seal to wound vac.   L abd drains in place with mild serous output; G tube to gravity.   Genitourinary:     Comments: L groin with a line in place.  Bhardwaj in place  Musculoskeletal:      Comments: LUE weakness noted.   Skin:     General: Skin is warm and dry.      Coloration: Skin is not jaundiced.   Neurological:      Mental Status: She is alert.      Cranial Nerves: No cranial nerve deficit.      Sensory: No sensory deficit.      Motor: Weakness present.      Comments: Sedated         Significant Labs:  CBC:   Recent Labs   Lab 09/26/20  0646   WBC 16.62*   RBC 2.15*   HGB 6.0*   HCT 20.6*      MCV 96   MCH 27.9   MCHC 29.1*     CMP:   Recent Labs   Lab 09/26/20  0646      CALCIUM 8.0*   ALBUMIN 1.0*   PROT 5.4*      K 3.7   CO2 23      BUN 54*   CREATININE 1.0   ALKPHOS 191*   ALT 24   AST 42*   BILITOT 1.9*       Significant Diagnostics:  I have reviewed all pertinent imaging results/findings within the past 24 hours.    Assessment/Plan:     * Perforated abdominal viscus  50 y.o. female w/ free air on CT scan. S/p emergent ex lap on 9/4 w/ findings of gastric perforation, primary  repair. Now w/  abdominal closure on 9/6, skin stapled closed, KERRY drains removed. Class A to OR on 9/18 for free air and extrav or oral contrast seen on CT. CT on 9/25 redemonstrated contrast leaking from stomach, but well-drained with surgical drain.    Continue SICU care, appreciate their assistance  No operative intervention for gastric leaking at this time. Leak controlled with elian drain. Tissue is so friable that repeat operative intervention around the G tube will not provide a long-term solution.   G tube and NG to gravity/suction  Wean vent settings as tolerated  Wound vac changes w/ wound care  Recommend ongoing goals of care discussion with the patient's         Constance Martin MD  General Surgery  [facility]

## 2020-09-26 NOTE — PROGRESS NOTES
Pharmacokinetic Initial Assessment: IV Vancomycin    Assessment/Plan:    Patient received intravenous vancomycin loading dose of 1750 mg once   Will begin maintenance regimen of 1000mg (15mg/kg) every 12 hours since Scr trending down to baseline and pt spiking fevers.   Desired empiric serum trough concentration is 10 to 20 mcg/mL  Draw vancomycin trough level prior to the 4th dose on 9/27 @ 0830  Pharmacy will continue to follow and monitor vancomycin.      Please contact pharmacy at extension [phone] with any questions regarding this assessment.     Thank you for the consult,   Azalia Mackey       Patient brief summary:  Elda Hernandez is a 50 y.o. female initiated on antimicrobial therapy with IV Vancomycin for treatment of suspected lower respiratory infection    Drug Allergies:   Review of patient's allergies indicates:   Allergen Reactions    Codeine Itching     Other reaction(s): Itching    Lipitor [atorvastatin] Other (See Comments)     Other reaction(s): Muscle pain  Muscle cranmps    Morphine Itching     Other reaction(s): nausea and vomiting     Zoloft [sertraline] Other (See Comments)     Tremors/muscle spasms       Actual Body Weight:   67.6 kg    Renal Function:   Estimated Creatinine Clearance: 70.2 mL/min (based on SCr of 1 mg/dL).,       CBC (last 72 hours):  Recent Labs   Lab Result Units 09/24/20  0300 09/25/20 0300 09/26/20  0646   WBC K/uL 13.40* 18.46* 16.62*   Hemoglobin g/dL 7.7* 7.5* 6.0*   Hematocrit % 24.7* 24.6* 20.6*   Platelets K/uL 195 229 205   Gran% % 73.0 87.0*  --    Lymph% % 10.7* 2.5*  --    Mono% % 9.9 3.0*  --    Eosinophil% % 2.3 1.0  --    Basophil% % 0.4 1.0  --    Differential Method  Automated Manual  --        Metabolic Panel (last 72 hours):  Recent Labs   Lab Result Units 09/23/20  1200 09/24/20  0300 09/25/20  0300 09/26/20  0646   Sodium mmol/L 144 145 143 140   Potassium mmol/L 4.2 3.5 3.9 3.7   Chloride mmol/L 115* 113* 111* 109   CO2 mmol/L 19* 20* 21* 23    Glucose mg/dL 124* 106 114* 110   BUN, Bld mg/dL 40* 40* 48* 54*   Creatinine mg/dL 1.0 1.1 1.1 1.0   Albumin g/dL 1.1* 1.2* 1.2* 1.0*   Total Bilirubin mg/dL 2.9* 2.8* 2.6* 1.9*   Alkaline Phosphatase U/L 192* 190* 208* 191*   AST U/L 27 28 38 42*   ALT U/L 25 24 23 24   Magnesium mg/dL  --  2.0 2.1  --    Phosphorus mg/dL  --  3.9 4.5  --        Drug levels (last 3 results):  No results for input(s): VANCOMYCINRA, VANCOMYCINPE, VANCOMYCINTR in the last 72 hours.    Microbiologic Results:  Microbiology Results (last 7 days)     Procedure Component Value Units Date/Time    Culture, VAP (BAL) [[id number]] Collected: 09/25/20 1238    Order Status: Completed Specimen: Bronchial Alveolar Lavage from BAL, LLL Updated: 09/26/20 0759     VAP BAL CULTURE No Growth     Gram Stain (Respiratory) <10 epithelial cells per low power field.     Gram Stain (Respiratory) Rare WBC's     Gram Stain (Respiratory) No organisms seen    Fungus culture [[id number]]  (Abnormal) Collected: 09/18/20 1544    Order Status: Completed Specimen: Abscess from Abdomen Updated: 09/24/20 0945     Fungus (Mycology) Culture YEAST   Rare  Identification pending      Narrative:      1. Inter-Abdominal Abscess for cultures  2. Inter-Abdominal Abscess for cultures    Culture, Anaerobe [[id number]]  (Abnormal) Collected: 09/18/20 1544    Order Status: Completed Specimen: Abscess from Abdomen Updated: 09/24/20 0706     Anaerobic Culture BACTEROIDES THETAIOTAOMICRON  Moderate      Narrative:      1. Inter-Abdominal Abscess for cultures  2. Inter-Abdominal Abscess for cultures    Aerobic culture [[id number]]  (Abnormal)  (Susceptibility) Collected: 09/18/20 1544    Order Status: Completed Specimen: Abscess from Abdomen Updated: 09/21/20 1028     Aerobic Bacterial Culture KLEBSIELLA PNEUMONIAE  Many      Narrative:      1. Inter-Abdominal Abscess for cultures  2. Inter-Abdominal Abscess for cultures

## 2020-09-26 NOTE — SUBJECTIVE & OBJECTIVE
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    Arnold-Chiari malformation     Depression     Esophageal stricture     Essential tremor     Hypertension     Left bundle branch block     Liver fibrosis, transplanted liver 10/2/2018    Suggested on fibroscan 10/2/18    Migraine without aura     MVP (mitral valve prolapse)     Non-rheumatic mitral regurgitation 10/1/2018    Non-rheumatic tricuspid valve insufficiency 10/1/2018    Osteoporosis     Recurrent urinary tract infection     Seizures     Shingles 2007    SIADH (syndrome of inappropriate ADH production)     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    Tricuspid valve prolapse     Urolithiasis     Von Gierke disease     s/p liver transplant       Past Surgical History:   Procedure Laterality Date    APPENDECTOMY  6/22/2007    APPLICATION OF WOUND VACUUM-ASSISTED CLOSURE DEVICE N/A 9/18/2020    Procedure: APPLICATION, WOUND VAC;  Surgeon: Zain Decker MD;  Location: [address];  Service: General;  Laterality: N/A;    COLONOSCOPY  5/13/2008    internal hemorrhoids    CRANIOTOMY      ESOPHAGOGASTRODUODENOSCOPY N/A 9/3/2020    Procedure: EGD (ESOPHAGOGASTRODUODENOSCOPY);  Surgeon: Tryel Vergara MD;  Location: [facility];  Service: Endoscopy;  Laterality: N/A;    LAMINECTOMY  3/2001    LIVER TRANSPLANT  9/23/2002    OSSICULAR RECONSTRUCTION  10/4/1995    RIGHT REPLACEMENT PROSTHESIS for cholesteatoma    THYMECTOMY  5/2/2007    TONSILLECTOMY, ADENOIDECTOMY  1/21/2004    TOTAL ABDOMINAL HYSTERECTOMY  3/31/1994       Review of patient's allergies indicates:   Allergen Reactions    Codeine Itching     Other reaction(s): Itching    Lipitor [atorvastatin] Other (See Comments)     Other reaction(s): Muscle pain  Muscle cranmps    Morphine Itching     Other reaction(s): nausea and vomiting     Zoloft [sertraline] Other (See Comments)     Tremors/muscle spasms       Medications:  Medications  Prior to Admission   Medication Sig    mycophenolate (CELLCEPT) 250 mg Cap Take 2 capsules (500 mg total) by mouth 2 (two) times daily.    tacrolimus (PROGRAF) 1 MG Cap TAKE 2 CAPSULES BY MOUTH EVERY MORNING AND 1 CAPSULE EVERY EVENING    calcium carbonate (OS-JASON) 600 mg (1,500 mg) Tab Take 600 mg by mouth 2 (two) times daily with meals.    demeclocycline (DECLOMYCIN) 150 MG Tab Take 150 mg by mouth every 12 (twelve) hours.    DENAVIR 1 % cream RICHAR EXT AA Q 2 H FOR 4 DAYS    levothyroxine (SYNTHROID) 75 MCG tablet Take 75 mcg by mouth once daily.    lifitegrast (XIIDRA) 5 % Dpet Place 1 drop into both eyes 2 (two) times daily. (Patient not taking: Reported on 3/3/2020)    magnesium oxide (MAG-OX) 400 mg (241.3 mg magnesium) tablet TK 2 TS PO BID    multivitamin capsule Take 1 capsule by mouth once daily.    potassium chloride (KLOR-CON) 10 MEQ TbSR Take 4 tablets (40 mEq total) by mouth once daily.    promethazine (PHENERGAN) 25 MG tablet Take 25 mg by mouth every 4 (four) hours as needed (if unrelieved by zofran).     triamcinolone acetonide 0.1% (KENALOG) 0.1 % cream Apply topically 2 (two) times daily. (Patient not taking: Reported on 3/3/2020)     Antibiotics (From admission, onward)    Start     Stop Route Frequency Ordered    09/26/20 0930  vancomycin in dextrose 5 % 1 gram/250 mL IVPB 1,000 mg      -- IV Every 12 hours (non-standard times) 09/26/20 0816    09/25/20 1930  meropenem (MERREM) 2 g in sodium chloride 0.9% 100 mL IVPB      -- IV Every 8 hours (non-standard times) 09/25/20 1822 09/25/20 1920  vancomycin - pharmacy to dose  (vancomycin IVPB)      -- IV pharmacy to manage frequency 09/25/20 1821        Antifungals (From admission, onward)    Start     Stop Route Frequency Ordered    09/25/20 1930  micafungin 100 mg in sodium chloride 0.9 % 100 mL IVPB (ready to mix system)      -- IV Every 24 hours (non-standard times) 09/25/20 1820        Antivirals (From admission, onward)    None            Immunization History   Administered Date(s) Administered    Influenza 11/14/2015    Influenza - Quadrivalent 11/11/2014    Influenza - Quadrivalent - MDCK - PF 11/18/2017    Influenza - Quadrivalent - PF *Preferred* (6 months and older) 09/22/2018    Influenza Split 10/20/2007, 10/28/2008, 10/27/2010, 08/28/2011    PPD Test 09/14/2020    Pneumococcal Polysaccharide - 23 Valent 10/28/2008       Family History     None        Social History     Socioeconomic History    Marital status:      Spouse name: Not on file    Number of children: Not on file    Years of education: Not on file    Highest education level: Not on file   Occupational History    Not on file   Social Needs    Financial resource strain: Not on file    Food insecurity     Worry: Not on file     Inability: Not on file    Transportation needs     Medical: Not on file     Non-medical: Not on file   Tobacco Use    Smoking status: Never Smoker   Substance and Sexual Activity    Alcohol use: No    Drug use: No    Sexual activity: Not on file   Lifestyle    Physical activity     Days per week: Not on file     Minutes per session: Not on file    Stress: Not on file   Relationships    Social connections     Talks on phone: Not on file     Gets together: Not on file     Attends [employer] service: Not on file     Active member of club or organization: Not on file     Attends meetings of clubs or organizations: Not on file     Relationship status: Not on file   Other Topics Concern    Not on file   Social History Narrative    Not on file     Review of Systems   Unable to perform ROS: Acuity of condition     Objective:     Vital Signs (Most Recent):  Temp: 97.8 °F (36.6 °C) (09/26/20 1500)  Pulse: (!) 54 (09/26/20 1615)  Resp: 18 (09/26/20 1615)  BP: (!) 89/55 (09/26/20 1615)  SpO2: 95 % (09/26/20 1615) Vital Signs (24h Range):  Temp:  [97.8 °F (36.6 °C)-100.5 °F (38.1 °C)] 97.8 °F (36.6 °C)  Pulse:  [] 54  Resp:  [17-35]  18  SpO2:  [94 %-100 %] 95 %  BP: ()/(45-72) 89/55  Arterial Line BP: ()/(26-86) 52/42     Weight: 67.6 kg (149 lb)  Body mass index is 30.09 kg/m².    Estimated Creatinine Clearance: 70.2 mL/min (based on SCr of 1 mg/dL).    Physical Exam  Vitals signs reviewed.   Constitutional:       General: She is not in acute distress.     Appearance: She is obese. She is ill-appearing. She is not toxic-appearing or diaphoretic.   HENT:      Head: Normocephalic and atraumatic.      Mouth/Throat:      Comments: Intubated, OG tube  Neck:      Comments: RIJ line  Pulmonary:      Effort: Pulmonary effort is normal. No respiratory distress.   Abdominal:      Comments: Midline incision with wound vac. LUQ with 2 KERRY drains, feeding tube with yellow green cloudy output   Genitourinary:     Comments: Bhardwaj catheter  Musculoskeletal:      Right lower leg: Edema present.      Left lower leg: Edema present.         Significant Labs: All pertinent labs within the past 24 hours have been reviewed.    Significant Imaging: I have reviewed all pertinent imaging results/findings within the past 24 hours.

## 2020-09-26 NOTE — CONSULTS
[facility]  Infectious Disease  Consult Note    Patient Name: Elda Hernandez  MRN: [id number]  Admission Date: 9/3/2020  Hospital Length of Stay: 23 days  Attending Physician: Clark Rodriguez MD  Primary Care Provider: Jordana Woods MD     Isolation Status: No active isolations    Patient information was obtained from past medical records.      Inpatient consult to Infectious Diseases  Consult performed by: Trinidad Haines MD  Consult ordered by: Carl Pino MD        Assessment/Plan:     * Perforated abdominal viscus  50-year-old female with von Gierke disease s/p liver transplant 2002 on tacro/MMF c/b cirrhosis 10/2019, presented to OSH with epigastric pain, nausea, vomiting, found to have perforation of greater curvature of proximal stomach c/b septic shock and acute respiratory failure, [place] [facility] [place], s/p ex-lap with washout and repair of gastric perforation 9/4/2020, ex-lap with washout abdominal closure 9/6/2020, found to have gastric leak, s/p ex-lap, washout, repair of gastric performation 9/18/2020.  Abdominal cultures with Klebsiella, Bacteroides, and yeast. CT abd/pelvis 9/25/2020 with extraluminal extravasation of ingested contrast adjacent to stomach fundus, NG tube and feeding tube protruding through gastric fundal defect, diffuse thickening of stomach, small/large bowel, bilateral pulmonary consolidative opacities. Patient with acute respiratory failure 9/25/2020 requiring intubation, concern for aspiration PNA / HAP.    Patient with prolonged hospital course with multiple courses of antibiotics including pip-tazo and fluconazole - will broaden intraabdominal coverage for drug-resistant Candida and gram-negative organisms. Vancomycin started for HAP.    Recommendations:  - Continue trevor / vanc / micafungin  - Follow-up bronch cultures        Thank you for your consult. I will follow-up with patient. Please contact us if you have any additional  questions.    Trinidad Haines MD  Infectious Disease  [facility]    Subjective:     Principal Problem: Perforated abdominal viscus    HPI: 50-year-old female with von Gierke disease s/p liver transplant 2002 on tacro/MMF c/b cirrhosis 10/2019, presented to OSH with epigastric pain, nausea, vomiting, found to have perforation of greater curvature of proximal stomach c/b septic shock and acute respiratory failure, transferred to [facility] [place] 9/3/2020, s/p ex-lap with washout and repair of gastric perforation 9/4/2020, ex-lap with washout abdominal closure 9/6/2020, found to have gastric leak, s/p ex-lap, washout, repair of gastric performation 9/18/2020.  Abdominal cultures with Klebsiella, Bacteroides, and yeast. CT abd/pelvis 9/25/2020 with extraluminal extravasation of ingested contrast adjacent to stomach fundus, NG tube and feeding tube protruding through gastric fundal defect, diffuse thickening of stomach, small/large bowel, bilateral pulmonary consolidative opacities.  Yesterday, patient intubated for respiratory failure, on vent FiO2 100%, PEEP 5. No pressor requirements. On TPN and dex. ID consulted for antibiotic recommendations.       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    Arnold-Chiari malformation     Depression     Esophageal stricture     Essential tremor     Hypertension     Left bundle branch block     Liver fibrosis, transplanted liver 10/2/2018    Suggested on fibroscan 10/2/18    Migraine without aura     MVP (mitral valve prolapse)     Non-rheumatic mitral regurgitation 10/1/2018    Non-rheumatic tricuspid valve insufficiency 10/1/2018    Osteoporosis     Recurrent urinary tract infection     Seizures     Shingles 2007    SIADH (syndrome of inappropriate ADH production)     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    Tricuspid valve prolapse     Urolithiasis     Von Gierke disease     s/p liver transplant       Past Surgical  History:   Procedure Laterality Date    APPENDECTOMY  6/22/2007    APPLICATION OF WOUND VACUUM-ASSISTED CLOSURE DEVICE N/A 9/18/2020    Procedure: APPLICATION, WOUND VAC;  Surgeon: Zain Decker MD;  Location: [address];  Service: General;  Laterality: N/A;    COLONOSCOPY  5/13/2008    internal hemorrhoids    CRANIOTOMY      ESOPHAGOGASTRODUODENOSCOPY N/A 9/3/2020    Procedure: EGD (ESOPHAGOGASTRODUODENOSCOPY);  Surgeon: Tyrel Vergara MD;  Location: [facility];  Service: Endoscopy;  Laterality: N/A;    LAMINECTOMY  3/2001    LIVER TRANSPLANT  9/23/2002    OSSICULAR RECONSTRUCTION  10/4/1995    RIGHT REPLACEMENT PROSTHESIS for cholesteatoma    THYMECTOMY  5/2/2007    TONSILLECTOMY, ADENOIDECTOMY  1/21/2004    TOTAL ABDOMINAL HYSTERECTOMY  3/31/1994       Review of patient's allergies indicates:   Allergen Reactions    Codeine Itching     Other reaction(s): Itching    Lipitor [atorvastatin] Other (See Comments)     Other reaction(s): Muscle pain  Muscle cranmps    Morphine Itching     Other reaction(s): nausea and vomiting     Zoloft [sertraline] Other (See Comments)     Tremors/muscle spasms       Medications:  Medications Prior to Admission   Medication Sig    mycophenolate (CELLCEPT) 250 mg Cap Take 2 capsules (500 mg total) by mouth 2 (two) times daily.    tacrolimus (PROGRAF) 1 MG Cap TAKE 2 CAPSULES BY MOUTH EVERY MORNING AND 1 CAPSULE EVERY EVENING    calcium carbonate (OS-JASON) 600 mg (1,500 mg) Tab Take 600 mg by mouth 2 (two) times daily with meals.    demeclocycline (DECLOMYCIN) 150 MG Tab Take 150 mg by mouth every 12 (twelve) hours.    DENAVIR 1 % cream RICHAR EXT AA Q 2 H FOR 4 DAYS    levothyroxine (SYNTHROID) 75 MCG tablet Take 75 mcg by mouth once daily.    lifitegrast (XIIDRA) 5 % Dpet Place 1 drop into both eyes 2 (two) times daily. (Patient not taking: Reported on 3/3/2020)    magnesium oxide (MAG-OX) 400 mg (241.3 mg magnesium) tablet TK 2 TS PO BID     multivitamin capsule Take 1 capsule by mouth once daily.    potassium chloride (KLOR-CON) 10 MEQ TbSR Take 4 tablets (40 mEq total) by mouth once daily.    promethazine (PHENERGAN) 25 MG tablet Take 25 mg by mouth every 4 (four) hours as needed (if unrelieved by zofran).     triamcinolone acetonide 0.1% (KENALOG) 0.1 % cream Apply topically 2 (two) times daily. (Patient not taking: Reported on 3/3/2020)     Antibiotics (From admission, onward)    Start     Stop Route Frequency Ordered    09/26/20 0930  vancomycin in dextrose 5 % 1 gram/250 mL IVPB 1,000 mg      -- IV Every 12 hours (non-standard times) 09/26/20 0816    09/25/20 1930  meropenem (MERREM) 2 g in sodium chloride 0.9% 100 mL IVPB      -- IV Every 8 hours (non-standard times) 09/25/20 1822 09/25/20 1920  vancomycin - pharmacy to dose  (vancomycin IVPB)      -- IV pharmacy to manage frequency 09/25/20 1821        Antifungals (From admission, onward)    Start     Stop Route Frequency Ordered    09/25/20 1930  micafungin 100 mg in sodium chloride 0.9 % 100 mL IVPB (ready to mix system)      -- IV Every 24 hours (non-standard times) 09/25/20 1820        Antivirals (From admission, onward)    None           Immunization History   Administered Date(s) Administered    Influenza 11/14/2015    Influenza - Quadrivalent 11/11/2014    Influenza - Quadrivalent - MDCK - PF 11/18/2017    Influenza - Quadrivalent - PF *Preferred* (6 months and older) 09/22/2018    Influenza Split 10/20/2007, 10/28/2008, 10/27/2010, 08/28/2011    PPD Test 09/14/2020    Pneumococcal Polysaccharide - 23 Valent 10/28/2008       Family History     None        Social History     Socioeconomic History    Marital status:      Spouse name: Not on file    Number of children: Not on file    Years of education: Not on file    Highest education level: Not on file   Occupational History    Not on file   Social Needs    Financial resource strain: Not on file    Food  insecurity     Worry: Not on file     Inability: Not on file    Transportation needs     Medical: Not on file     Non-medical: Not on file   Tobacco Use    Smoking status: Never Smoker   Substance and Sexual Activity    Alcohol use: No    Drug use: No    Sexual activity: Not on file   Lifestyle    Physical activity     Days per week: Not on file     Minutes per session: Not on file    Stress: Not on file   Relationships    Social connections     Talks on phone: Not on file     Gets together: Not on file     Attends Sabianism service: Not on file     Active member of club or organization: Not on file     Attends meetings of clubs or organizations: Not on file     Relationship status: Not on file   Other Topics Concern    Not on file   Social History Narrative    Not on file     Review of Systems   Unable to perform ROS: Acuity of condition     Objective:     Vital Signs (Most Recent):  Temp: 97.8 °F (36.6 °C) (09/26/20 1500)  Pulse: (!) 54 (09/26/20 1615)  Resp: 18 (09/26/20 1615)  BP: (!) 89/55 (09/26/20 1615)  SpO2: 95 % (09/26/20 1615) Vital Signs (24h Range):  Temp:  [97.8 °F (36.6 °C)-100.5 °F (38.1 °C)] 97.8 °F (36.6 °C)  Pulse:  [] 54  Resp:  [17-35] 18  SpO2:  [94 %-100 %] 95 %  BP: ()/(45-72) 89/55  Arterial Line BP: ()/(26-86) 52/42     Weight: 67.6 kg (149 lb)  Body mass index is 30.09 kg/m².    Estimated Creatinine Clearance: 70.2 mL/min (based on SCr of 1 mg/dL).    Physical Exam  Vitals signs reviewed.   Constitutional:       General: She is not in acute distress.     Appearance: She is obese. She is ill-appearing. She is not toxic-appearing or diaphoretic.   HENT:      Head: Normocephalic and atraumatic.      Mouth/Throat:      Comments: Intubated, OG tube  Neck:      Comments: RIJ line  Pulmonary:      Effort: Pulmonary effort is normal. No respiratory distress.   Abdominal:      Comments: Midline incision with wound vac. LUQ with 2 KERRY drains, feeding tube with yellow green  cloudy output   Genitourinary:     Comments: Bhardwaj catheter  Musculoskeletal:      Right lower leg: Edema present.      Left lower leg: Edema present.         Significant Labs: All pertinent labs within the past 24 hours have been reviewed.    Significant Imaging: I have reviewed all pertinent imaging results/findings within the past 24 hours.

## 2020-09-26 NOTE — SUBJECTIVE & OBJECTIVE
Interval History: Urgently reintubated yesterday for hypoxia. CT scan showed gross leakage of PO contrast through gastric anastomosis, coming out of the elian drain placed near. Drop in Hb this morning after attempted femoral dodie yesterday. Dodie no longer working - no good place to put another.    Medications:  Continuous Infusions:   dexmedetomidine (PRECEDEX) infusion 1 mcg/kg/hr (09/26/20 1031)    lactated ringers 125 mL/hr at 09/26/20 0900    norepinephrine bitartrate-D5W      propofoL 20 mcg/kg/min (09/26/20 0733)    TPN ADULT CENTRAL LINE CUSTOM 60 mL/hr at 09/26/20 0900    TPN ADULT CENTRAL LINE CUSTOM       Scheduled Meds:   furosemide (LASIX) IV  40 mg Intravenous Q12H    heparin (porcine)  5,000 Units Subcutaneous Q8H    levothyroxine  40 mcg Intravenous Daily    meropenem (MERREM) IVPB  2 g Intravenous Q8H    micafungin (MYCAMINE) IVPB  100 mg Intravenous Q24H    pantoprazole  40 mg Intravenous Daily    tacrolimus  0.5 mg Sublingual BID    vancomycin (VANCOCIN) IVPB  15 mg/kg Intravenous Q12H     PRN Meds:sodium chloride, sodium chloride, acetaminophen, albuterol-ipratropium, calcium gluconate IVPB, calcium gluconate IVPB, calcium gluconate IVPB, dextrose 50%, dextrose 50%, glucagon (human recombinant), glucose, glucose, haloperidol lactate, HYDROmorphone, insulin aspart U-100, iohexol, labetaloL, magnesium sulfate IVPB, magnesium sulfate IVPB, ondansetron, potassium chloride in water **AND** potassium chloride in water **AND** potassium chloride in water, promethazine, sodium chloride 0.9%, sodium chloride 0.9%, sodium phosphate IVPB, sodium phosphate IVPB, sodium phosphate IVPB, Pharmacy to dose Vancomycin consult **AND** vancomycin - pharmacy to dose     Review of patient's allergies indicates:   Allergen Reactions    Codeine Itching     Other reaction(s): Itching    Lipitor [atorvastatin] Other (See Comments)     Other reaction(s): Muscle pain  Muscle cranmps    Morphine Itching      Other reaction(s): nausea and vomiting     Zoloft [sertraline] Other (See Comments)     Tremors/muscle spasms     Objective:     Vital Signs (Most Recent):  Temp: 98.5 °F (36.9 °C) (09/26/20 0950)  Pulse: 93 (09/26/20 1030)  Resp: 20 (09/26/20 1030)  BP: (!) 146/65 (09/26/20 1015)  SpO2: 100 % (09/26/20 1030) Vital Signs (24h Range):  Temp:  [98.4 °F (36.9 °C)-100.5 °F (38.1 °C)] 98.5 °F (36.9 °C)  Pulse:  [] 93  Resp:  [12-35] 20  SpO2:  [93 %-100 %] 100 %  BP: ()/() 146/65  Arterial Line BP: ()/(26-86) 52/42     Weight: 67.6 kg (149 lb)  Body mass index is 30.09 kg/m².    Intake/Output - Last 3 Shifts       09/24 0700 - 09/25 0659 09/25 0700 - 09/26 0659 09/26 0700 - 09/27 0659    I.V. (mL/kg) 501 (7.4) 666.4 (9.9)     Blood   350    IV Piggyback  800     TPN 1729 1454     Total Intake(mL/kg) 2230 (33) 2920.4 (43.2) 350 (5.2)    Urine (mL/kg/hr) 2140 (1.3) 1940 (1.2) 230 (0.9)    Drains 543 815     Other 0 50     Stool 0      Total Output 2683 2805 230    Net -453 +115.4 +120           Stool Occurrence 1 x            Physical Exam  Vitals signs and nursing note reviewed.   Constitutional:       Appearance: She is ill-appearing.   HENT:      Head: Normocephalic and atraumatic.      Nose:      Comments: NG tube in place     Mouth/Throat:      Mouth: Mucous membranes are dry.      Comments: Endotracheal tube in place  Eyes:      General: No scleral icterus.  Neck:      Comments: RIJ central line  Cardiovascular:      Rate and Rhythm: Normal rate and regular rhythm.      Heart sounds: Normal heart sounds.   Pulmonary:      Breath sounds: Rhonchi: bilateral.      Comments: Mechanically ventilated.  Abdominal:      General: There is distension.      Palpations: Abdomen is soft.      Comments: Midline incision with wound vac in place; excellent seal to wound vac.   L abd drains in place with mild serous output; G tube to gravity.   Genitourinary:     Comments: L groin with a line in  place.  Bhardwaj in place  Musculoskeletal:      Comments: LUE weakness noted.   Skin:     General: Skin is warm and dry.      Coloration: Skin is not jaundiced.   Neurological:      Mental Status: She is alert.      Cranial Nerves: No cranial nerve deficit.      Sensory: No sensory deficit.      Motor: Weakness present.      Comments: Sedated         Significant Labs:  CBC:   Recent Labs   Lab 09/26/20  0646   WBC 16.62*   RBC 2.15*   HGB 6.0*   HCT 20.6*      MCV 96   MCH 27.9   MCHC 29.1*     CMP:   Recent Labs   Lab 09/26/20  0646      CALCIUM 8.0*   ALBUMIN 1.0*   PROT 5.4*      K 3.7   CO2 23      BUN 54*   CREATININE 1.0   ALKPHOS 191*   ALT 24   AST 42*   BILITOT 1.9*       Significant Diagnostics:  I have reviewed all pertinent imaging results/findings within the past 24 hours.

## 2020-09-26 NOTE — ASSESSMENT & PLAN NOTE
50 y.o. female w/ free air on CT scan. S/p emergent ex lap on 9/4 w/ findings of gastric perforation, primary repair. Now w/  abdominal closure on 9/6, skin stapled closed, KERRY drains removed. Class A to OR on 9/18 for free air and extrav or oral contrast seen on CT. CT on 9/25 redemonstrated contrast leaking from stomach, but well-drained with surgical drain.    Continue SICU care, appreciate their assistance  No operative intervention for gastric leaking at this time. Leak controlled with elian drain. Tissue is so friable that repeat operative intervention around the G tube will not provide a long-term solution.   G tube and NG to gravity/suction  Wean vent settings as tolerated  Wound vac changes w/ wound care  Recommend ongoing goals of care discussion with the patient's

## 2020-09-26 NOTE — ASSESSMENT & PLAN NOTE
Neuro:  - sedation: propofol @ 25  - analgesia: PRN oxy. Dilauded PRN  - Tylenol  - haldol q6hr PRN for agitation  - CT head yesterday for likely stroke.     CV:   - HDS off of pressors  - Mildly hypotensive this morning. Fluid resuscitation begun.  - femoral a-line placed yesterday not functioning.  - Strict monitoring of BP w/ cuff    Pulm:   - Extubated 9/22  - Oxygen requirements increased acutely 9/25.Satting in the mid-90's on 10L NC O2  - Intubated 9/25 due to continued respiratory decline.   - Bronchoscopy w/ lavage performed  - Ventilator settings weaned to 60% FiO2 and 5 peep  - Chest XR this morning shows moderate edema of the R lung.  - Duonebs Q6 PRN     FEN/GI: Perforated stomach; s/p washout and patch (9/4); abdominal closure (9/6)  - s/p ex-lap on 9/19   - continue to hold tube feeds at this time  - NG tube, G tube, and L abdominal drains in place. Purulent output from bulb drain noted.  - G tube upsized  - Lactulose discontinued (9/14) in setting of normal ammonia level (9/10) and improving mental status  - Daily metabolic panel with PRN repletion of electrolytes  - pantoprazole for ulcer ppx  - Cont TPN  - LR @ 125/hr  - 1L LR bolus     Renal:  - Continue to monitor with daily metabolic labs  - nephrology on board, appreciate recs  - Bun/Cr 48/1.1 today; continues to improve  - UOP appx 2L yesterday. NET +115mL yesterday  - Bhardwaj in place; monitoring I/Os     HEME/ID:   - Afebrile  - H/H down-trending. 6.0/20.6  - Daily CBC  - s/p Liver transplant in 2002; Daily tacrolimus level with AM labs. Per hepatology, continue tacrolimus 1mg BID  - acetaminophen PRN  - Metronidazole and Ceftriaxone  - heparin for DVT ppx  - Abdominal culture grew Klebsiella pneumonia; sensitive to zosyn  - CT Abd/pelvis for suspected abdominal source of decompensation.    Endo:  - Synthroid 40mcg daily  - insulin aspart for blood glucose control    Dispo:  - Continue SICU care.

## 2020-09-26 NOTE — NURSING
0750: Notified Dr. Damon of patient's Hgb 6.0 and HCT 20.6. Orders placed for 2 U PRBC's. Will carry out orders.     0810: Dr. Damon & Dr. Bradley at bedside assessing patient. 1 L LR bolus ordered for cuff pressure of 85/48, MAP 61.

## 2020-09-26 NOTE — ASSESSMENT & PLAN NOTE
50-year-old female with von Gierke disease s/p liver transplant 2002 on tacro/MMF c/b cirrhosis 10/2019, presented to OSH with epigastric pain, nausea, vomiting, found to have perforation of greater curvature of proximal stomach c/b septic shock and acute respiratory failure, transferred to [facility] [place] 9/3/2020, s/p ex-lap with washout and repair of gastric perforation 9/4/2020, ex-lap with washout abdominal closure 9/6/2020, found to have gastric leak, s/p ex-lap, washout, repair of gastric performation 9/18/2020.  Abdominal cultures with Klebsiella, Bacteroides, and yeast. CT abd/pelvis 9/25/2020 with extraluminal extravasation of ingested contrast adjacent to stomach fundus, NG tube and feeding tube protruding through gastric fundal defect, diffuse thickening of stomach, small/large bowel, bilateral pulmonary consolidative opacities. Patient with acute respiratory failure 9/25/2020 requiring intubation, concern for aspiration PNA / HAP.    Patient with prolonged hospital course with multiple courses of antibiotics including pip-tazo and fluconazole - will broaden intraabdominal coverage for drug-resistant Candida and gram-negative organisms. Vancomycin started for HAP.    Recommendations:  - Continue trevor / vanc / micafungin  - Follow-up bronch cultures

## 2020-09-26 NOTE — PROGRESS NOTES
[facility]  Critical Care - Surgery  Progress Note    Patient Name: Elda Hernandez  MRN: [id number]  Admission Date: 9/3/2020  Hospital Length of Stay: 23 days  Code Status: Full Code  Attending Provider: Clark Rodriguez MD  Primary Care Provider: Jordana Woods MD   Principal Problem: Perforated abdominal viscus    Subjective:     Hospital/ICU Course:  9/4: Pt admitted to SICU following ex lap for GI perf. Intubated, sedated. Wound vac in place.  Soon after arrival to unit a mottled appearance to RUE was noted.  Vascular consulted and US revealed R radial artery thrombosis.    9/5: Tachycardic..  R arm appearance greatly improved overnight.    9/6: Patient returned to OR for abdominal washout, closure. R arm with dopplerable signals to ulnar artery and palmar arch.   9/7: Attempted to wean patient off of sedation in an effort to move towards extubation. Patient weaned off of propofol. Precedex started. Patient's heart rate very labile and sensitize to sedation.   9/8: patient extubated  9/9: SHANICE. Labetalol PRN for HTN   9/10:  Diuresis increased.  Cr downtrending  9/11: Increased abdominal distension and elevated WBC count. CT abd  9/12: WBC to 45 today. Diflucan and zosyn initiated. Lactulose enema w/ improving mental status  9/13: WBC improved. Mental status improving. FWF initiated for hypernatremia.   9/14: White count continues to improve.  Oriented to person only this morning.    9/17: tolerating tube feeds, half TPN rate today. Possible step down   9/18: possible fall out of bed overnight. High G tube residual, now to gravity. Requiring comfortflo  9/19: s/p exlap. Intubated ans sedated. Weaning to extubate  9/20: No acute events overnight. Pt remains intubated, sedated. Plan to wean sedation for extubation.  9/21: Abdominal cultures grew Klebsiella; pt on zosyn  9/22: Pt did well with SBT yesterday; back on rate overnight.  Will try again today with hopes to extubate  9/23:  Extubated yesterday; doing well with nasal canula  9/24: remains on 3L NC. Diuresed yesterday w/ 240mg Lasix. Continue diuresis today with 40 Lasix BID  9/25: Blown L pupil / LUE weakness upon exam. CT head performed. CT abd/pelvis ordered.  9/26: Remains intubated. Pressure soft and H/H noted to be 6. Fluid resuscitation initiated. A-line not reading or drawing back.    Interval History/Significant Events:   - Remains intubated overnight. Ventilator settings weaned slightly.  - Hypotensive upon exam this morning. Fluid resuscitation started with LR bolus and maintenance fluids.  - Hemoglobin low. 2 U PRBC's ordered  - Sedation weaned for BP support.    Follow-up For: Procedure(s) (LRB):  LAPAROTOMY, EXPLORATORY, DRAINAGE OF ABSCESS, REPAIR OF GASTRIC PERFORATION, GASTROSTOMY TUBE PLACEMENT (N/A)  APPLICATION, WOUND VAC (N/A)    Post-Operative Day: 8 Days Post-Op    Objective:     Vital Signs (Most Recent):  Temp: 98.8 °F (37.1 °C) (09/26/20 0915)  Pulse: 89 (09/26/20 0915)  Resp: 19 (09/26/20 0915)  BP: (!) 107/55 (09/26/20 0915)  SpO2: 96 % (09/26/20 0915) Vital Signs (24h Range):  Temp:  [98.4 °F (36.9 °C)-100.5 °F (38.1 °C)] 98.8 °F (37.1 °C)  Pulse:  [] 89  Resp:  [12-35] 19  SpO2:  [93 %-100 %] 96 %  BP: ()/() 107/55  Arterial Line BP: ()/(26-86) 52/42     Weight: 67.6 kg (149 lb)  Body mass index is 30.09 kg/m².      Intake/Output Summary (Last 24 hours) at 9/26/2020 0930  Last data filed at 9/26/2020 0900  Gross per 24 hour   Intake 2920.4 ml   Output 2855 ml   Net 65.4 ml       Physical Exam  Vitals signs and nursing note reviewed.   Constitutional:       Appearance: She is ill-appearing.   HENT:      Head: Normocephalic and atraumatic.      Nose:      Comments: NG tube in place     Mouth/Throat:      Mouth: Mucous membranes are dry.      Comments: Endotracheal tube in place  Eyes:      General: No scleral icterus.  Neck:      Comments: RIJ central line  Cardiovascular:      Rate and  Rhythm: Normal rate and regular rhythm.      Heart sounds: Normal heart sounds.   Pulmonary:      Breath sounds: Rhonchi: bilateral.      Comments: Mechanically ventilated.  Abdominal:      General: There is distension.      Palpations: Abdomen is soft.      Comments: Midline incision with wound vac in place; excellent seal to wound vac.   L abd drains in place with mild serous output; G tube to gravity.   Genitourinary:     Comments: L groin with a line in place.  Bhardwaj in place  Musculoskeletal:      Comments: LUE weakness noted.   Skin:     General: Skin is warm and dry.      Coloration: Skin is not jaundiced.   Neurological:      Mental Status: She is alert.      Cranial Nerves: No cranial nerve deficit.      Sensory: No sensory deficit.      Motor: Weakness present.      Comments: Sedated         Vents:  Vent Mode: A/C (09/26/20 0712)  Ventilator Initiated: Yes (09/25/20 1221)  Set Rate: 18 BPM (09/26/20 0712)  Vt Set: 400 mL (09/26/20 0712)  Pressure Support: 5 cmH20 (09/22/20 0443)  PEEP/CPAP: 5 cmH20 (09/26/20 0712)  Oxygen Concentration (%): 80 (09/26/20 0900)  Peak Airway Pressure: 21 cmH2O (09/26/20 0712)  Plateau Pressure: 16 cmH20 (09/26/20 0712)  Total Ve: 7 mL (09/26/20 0712)  Negative Inspiratory Force (cm H2O): -31 (09/22/20 1021)  F/VT Ratio<105 (RSBI): (!) 54.22 (09/26/20 0712)    Lines/Drains/Airways     Central Venous Catheter Line            Percutaneous Central Line Insertion/Assessment - Triple Lumen  09/04/20 0800 right internal jugular 22 days          Drain                 Urethral Catheter 09/14/20 1330 11 days         Closed/Suction Drain 09/18/20 1555 Left Abdomen Bulb 19 Fr. 7 days         Closed/Suction Drain 09/18/20 1559 Left Abdomen Bulb 19 Fr. 7 days         Gastrostomy/Enterostomy 09/18/20 1612 Other (see comments) LUQ decompression;feeding 7 days         NG/OG Tube 09/18/20 1500 Right nostril 7 days          Airway                 Airway - Non-Surgical 09/25/20 1217 Endotracheal  Tube less than 1 day          Arterial Line            Arterial Line 09/25/20 1400 Left Femoral less than 1 day          Peripheral Intravenous Line                 Peripheral IV - Single Lumen 09/20/20 1017 22 G Posterior;Right Hand 5 days                Significant Labs:    CBC/Anemia Profile:  Recent Labs   Lab 09/25/20  0300 09/26/20  0646   WBC 18.46* 16.62*   HGB 7.5* 6.0*   HCT 24.6* 20.6*    205   MCV 93 96   RDW 16.7* 16.7*        Chemistries:  Recent Labs   Lab 09/25/20  0300 09/26/20  0646    140   K 3.9 3.7   * 109   CO2 21* 23   BUN 48* 54*   CREATININE 1.1 1.0   CALCIUM 8.1* 8.0*   ALBUMIN 1.2* 1.0*   PROT 6.4 5.4*   BILITOT 2.6* 1.9*   ALKPHOS 208* 191*   ALT 23 24   AST 38 42*   MG 2.1  --    PHOS 4.5  --        All pertinent labs within the past 24 hours have been reviewed.    Significant Imaging:  I have reviewed all pertinent imaging results/findings within the past 24 hours.    Assessment/Plan:     * Perforated abdominal viscus  Neuro:  - sedation: propofol @ 25  - analgesia: PRN oxy. Dilauded PRN  - Tylenol  - haldol q6hr PRN for agitation  - CT head yesterday for likely stroke.     CV:   - HDS off of pressors  - Mildly hypotensive this morning. Fluid resuscitation begun.  - femoral a-line placed yesterday not functioning.  - Strict monitoring of BP w/ cuff    Pulm:   - Extubated 9/22  - Oxygen requirements increased acutely 9/25.Satting in the mid-90's on 10L NC O2  - Intubated 9/25 due to continued respiratory decline.   - Bronchoscopy w/ lavage performed  - Ventilator settings weaned to 60% FiO2 and 5 peep  - Chest XR this morning shows moderate edema of the R lung.  - Duonebs Q6 PRN     FEN/GI: Perforated stomach; s/p washout and patch (9/4); abdominal closure (9/6)  - s/p ex-lap on 9/19   - continue to hold tube feeds at this time  - NG tube, G tube, and L abdominal drains in place. Purulent output from bulb drain noted.  - G tube upsized  - Lactulose discontinued (9/14)  in setting of normal ammonia level (9/10) and improving mental status  - Daily metabolic panel with PRN repletion of electrolytes  - pantoprazole for ulcer ppx  - Cont TPN  - LR @ 125/hr  - 1L LR bolus     Renal:  - Continue to monitor with daily metabolic labs  - nephrology on board, appreciate recs  - Bun/Cr 48/1.1 today; continues to improve  - UOP appx 2L yesterday. NET +115mL yesterday  - Bhardwaj in place; monitoring I/Os     HEME/ID:   - Afebrile  - H/H down-trending. 6.0/20.6  - Daily CBC  - s/p Liver transplant in 2002; Daily tacrolimus level with AM labs. Per hepatology, continue tacrolimus 1mg BID  - acetaminophen PRN  - Metronidazole and Ceftriaxone  - heparin for DVT ppx  - Abdominal culture grew Klebsiella pneumonia; sensitive to zosyn  - CT Abd/pelvis for suspected abdominal source of decompensation.    Endo:  - Synthroid 40mcg daily  - insulin aspart for blood glucose control    Dispo:  - Continue SICU care.       Critical care was time spent personally by me on the following activities: development of treatment plan with patient or surrogate and bedside caregivers, discussions with consultants, evaluation of patient's response to treatment, examination of patient, ordering and performing treatments and interventions, ordering and review of laboratory studies, ordering and review of radiographic studies, pulse oximetry, re-evaluation of patient's condition.  This critical care time did not overlap with that of any other provider or involve time for any procedures.     Khoa Damon MD  Critical Care - Surgery  [facility]

## 2020-09-26 NOTE — PLAN OF CARE
"      SICU PLAN OF CARE NOTE    Dx: Perforated abdominal viscus    Shift Events: Afebrile. Administered 2 U PRBC, see note. Patient hypotensive, administered LR boluses, patient's blood pressure increased, see flowsheets. Femoral arterial line removed by Dr. Bradley. Started precedex gtt and weaned propofol down. Started 125 cc LR gtt. UO trending down, held lasix dose today per Dr. Damon. Pain controlled with PRN Dilaudid. Plan of care reviewed with patient & patient's .    Goals of Care: MAP>60    Neuro: Sedated    Vital Signs: BP (!) 94/50 (BP Location: Left arm, Patient Position: Lying)   Pulse (!) 53   Temp 97.8 °F (36.6 °C) (Oral)   Resp 18   Ht 4' 11" (1.499 m)   Wt 67.6 kg (149 lb)   SpO2 95%   BMI 30.09 kg/m²     Respiratory: Ventilator    Diet: TPN    Gtts: Propfol, Precedex, and MIVF    Urine Output: Urinary Catheter 1450 cc/shift    Drains: KERRY Drain, total output 120 cc / shift, G-tube/J-tube, total output 100 cc /  shift, and NG/OG Tube 0 cc /  shift    Wound Vac 0     Labs/Accuchecks: All labs trended. See flowsheets.     Skin: Foam applied to heels & sacrum. Provided weight shift assistance & turned patient Q 2 hrs. Wound care orders carried out, see flowsheets. No new skin breakdown noted.        "

## 2020-09-26 NOTE — SUBJECTIVE & OBJECTIVE
Interval History/Significant Events:   - Remains intubated overnight. Ventilator settings weaned slightly.  - Hypotensive upon exam this morning. Fluid resuscitation started with LR bolus and maintenance fluids.  - Hemoglobin low. 2 U PRBC's ordered  - Sedation weaned for BP support.    Follow-up For: Procedure(s) (LRB):  LAPAROTOMY, EXPLORATORY, DRAINAGE OF ABSCESS, REPAIR OF GASTRIC PERFORATION, GASTROSTOMY TUBE PLACEMENT (N/A)  APPLICATION, WOUND VAC (N/A)    Post-Operative Day: 8 Days Post-Op    Objective:     Vital Signs (Most Recent):  Temp: 98.8 °F (37.1 °C) (09/26/20 0915)  Pulse: 89 (09/26/20 0915)  Resp: 19 (09/26/20 0915)  BP: (!) 107/55 (09/26/20 0915)  SpO2: 96 % (09/26/20 0915) Vital Signs (24h Range):  Temp:  [98.4 °F (36.9 °C)-100.5 °F (38.1 °C)] 98.8 °F (37.1 °C)  Pulse:  [] 89  Resp:  [12-35] 19  SpO2:  [93 %-100 %] 96 %  BP: ()/() 107/55  Arterial Line BP: ()/(26-86) 52/42     Weight: 67.6 kg (149 lb)  Body mass index is 30.09 kg/m².      Intake/Output Summary (Last 24 hours) at 9/26/2020 0930  Last data filed at 9/26/2020 0900  Gross per 24 hour   Intake 2920.4 ml   Output 2855 ml   Net 65.4 ml       Physical Exam  Vitals signs and nursing note reviewed.   Constitutional:       Appearance: She is ill-appearing.   HENT:      Head: Normocephalic and atraumatic.      Nose:      Comments: NG tube in place     Mouth/Throat:      Mouth: Mucous membranes are dry.      Comments: Endotracheal tube in place  Eyes:      General: No scleral icterus.  Neck:      Comments: RIJ central line  Cardiovascular:      Rate and Rhythm: Normal rate and regular rhythm.      Heart sounds: Normal heart sounds.   Pulmonary:      Breath sounds: Rhonchi: bilateral.      Comments: Mechanically ventilated.  Abdominal:      General: There is distension.      Palpations: Abdomen is soft.      Comments: Midline incision with wound vac in place; excellent seal to wound vac.   L abd drains in place with mild  serous output; G tube to gravity.   Genitourinary:     Comments: L groin with a line in place.  Bhardwaj in place  Musculoskeletal:      Comments: LUE weakness noted.   Skin:     General: Skin is warm and dry.      Coloration: Skin is not jaundiced.   Neurological:      Mental Status: She is alert.      Cranial Nerves: No cranial nerve deficit.      Sensory: No sensory deficit.      Motor: Weakness present.      Comments: Sedated         Vents:  Vent Mode: A/C (09/26/20 0712)  Ventilator Initiated: Yes (09/25/20 1221)  Set Rate: 18 BPM (09/26/20 0712)  Vt Set: 400 mL (09/26/20 0712)  Pressure Support: 5 cmH20 (09/22/20 0443)  PEEP/CPAP: 5 cmH20 (09/26/20 0712)  Oxygen Concentration (%): 80 (09/26/20 0900)  Peak Airway Pressure: 21 cmH2O (09/26/20 0712)  Plateau Pressure: 16 cmH20 (09/26/20 0712)  Total Ve: 7 mL (09/26/20 0712)  Negative Inspiratory Force (cm H2O): -31 (09/22/20 1021)  F/VT Ratio<105 (RSBI): (!) 54.22 (09/26/20 0712)    Lines/Drains/Airways     Central Venous Catheter Line            Percutaneous Central Line Insertion/Assessment - Triple Lumen  09/04/20 0800 right internal jugular 22 days          Drain                 Urethral Catheter 09/14/20 1330 11 days         Closed/Suction Drain 09/18/20 1555 Left Abdomen Bulb 19 Fr. 7 days         Closed/Suction Drain 09/18/20 1559 Left Abdomen Bulb 19 Fr. 7 days         Gastrostomy/Enterostomy 09/18/20 1612 Other (see comments) LUQ decompression;feeding 7 days         NG/OG Tube 09/18/20 1500 Right nostril 7 days          Airway                 Airway - Non-Surgical 09/25/20 1217 Endotracheal Tube less than 1 day          Arterial Line            Arterial Line 09/25/20 1400 Left Femoral less than 1 day          Peripheral Intravenous Line                 Peripheral IV - Single Lumen 09/20/20 1017 22 G Posterior;Right Hand 5 days                Significant Labs:    CBC/Anemia Profile:  Recent Labs   Lab 09/25/20  0300 09/26/20  0646   WBC 18.46* 16.62*   HGB  7.5* 6.0*   HCT 24.6* 20.6*    205   MCV 93 96   RDW 16.7* 16.7*        Chemistries:  Recent Labs   Lab 09/25/20  0300 09/26/20  0646    140   K 3.9 3.7   * 109   CO2 21* 23   BUN 48* 54*   CREATININE 1.1 1.0   CALCIUM 8.1* 8.0*   ALBUMIN 1.2* 1.0*   PROT 6.4 5.4*   BILITOT 2.6* 1.9*   ALKPHOS 208* 191*   ALT 23 24   AST 38 42*   MG 2.1  --    PHOS 4.5  --        All pertinent labs within the past 24 hours have been reviewed.    Significant Imaging:  I have reviewed all pertinent imaging results/findings within the past 24 hours.

## 2020-09-26 NOTE — HPI
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  Abdominal cultures with Klebsiella, Bacteroides, and yeast. CT abd/pelvis 9/25/2020 with extraluminal extravasation of ingested contrast adjacent to stomach fundus, NG tube and feeding tube protruding through gastric fundal defect, diffuse thickening of stomach, small/large bowel, bilateral pulmonary consolidative opacities.  Yesterday, patient intubated for respiratory failure, on vent FiO2 100%, PEEP 5. No pressor requirements.    ID called back on 10/1 for re-evaluation and recommendations on abx duration. No new abd cultures available since 9/18 (c. Parapsilosis, bacteroides, kleb pneumo). Patient has multiple drains and a wound vac in place. She is extubated and denies significant abd pain (states recently given pain med), but is tired during exam. Surgical notes reviewed, noted due to friable nature of gastric tissue, perforation is currently being controlled w/ percutaneous drainage, G-tube to drainage. Patient is on TPN. Fever of 100.7 today. WBC stable at 13. Remains on trevor, vanc and fluconazole.     Last CT 9/25:  Impression:     In this patient with recent gastric ulcer perforation status post surgical repair, there is extraluminal extravasation of ingested contrast adjacent to the stomach fundus, in a similar location of prior perforation.  Small volume adjacent free intraperitoneal air.  Nasogastric tube and feeding tube appear to protrude through a gastric fundal defect.  Percutaneous drainage  catheter in the left abdomen.     Diffuse thickening of the stomach, small and large bowel, worse since prior exam.  These findings are nonspecific and could be related to diffuse edema, infection, inflammation, or ischemia.     Bilateral pulmonary consolidative opacities, worse since prior.  Improved bilateral pleural effusions.     Significant narrowing of the celiac axis origin (sagittal series 602, image 99), however patent distally. This was not seen on prior contrast study dated 09/03/2020 .

## 2020-09-27 LAB
ALBUMIN SERPL BCP-MCNC: 0.8 G/DL (ref 3.5–5.2)
ALP SERPL-CCNC: 174 U/L (ref 55–135)
ALT SERPL W/O P-5'-P-CCNC: 21 U/L (ref 10–44)
ANION GAP SERPL CALC-SCNC: 7 MMOL/L (ref 8–16)
ANISOCYTOSIS BLD QL SMEAR: SLIGHT
AST SERPL-CCNC: 39 U/L (ref 10–40)
BASOPHILS # BLD AUTO: ABNORMAL K/UL (ref 0–0.2)
BASOPHILS NFR BLD: 1 % (ref 0–1.9)
BILIRUB SERPL-MCNC: 1.8 MG/DL (ref 0.1–1)
BUN SERPL-MCNC: 46 MG/DL (ref 6–20)
CALCIUM SERPL-MCNC: 7.9 MG/DL (ref 8.7–10.5)
CHLORIDE SERPL-SCNC: 109 MMOL/L (ref 95–110)
CO2 SERPL-SCNC: 23 MMOL/L (ref 23–29)
CREAT SERPL-MCNC: 0.8 MG/DL (ref 0.5–1.4)
DIFFERENTIAL METHOD: ABNORMAL
EOSINOPHIL # BLD AUTO: ABNORMAL K/UL (ref 0–0.5)
EOSINOPHIL NFR BLD: 3 % (ref 0–8)
ERYTHROCYTE [DISTWIDTH] IN BLOOD BY AUTOMATED COUNT: 16.5 % (ref 11.5–14.5)
EST. GFR  (AFRICAN AMERICAN): >60 ML/MIN/1.73 M^2
EST. GFR  (NON AFRICAN AMERICAN): >60 ML/MIN/1.73 M^2
GLUCOSE SERPL-MCNC: 109 MG/DL (ref 70–110)
HCT VFR BLD AUTO: 32.3 % (ref 37–48.5)
HGB BLD-MCNC: 10.1 G/DL (ref 12–16)
IMM GRANULOCYTES # BLD AUTO: ABNORMAL K/UL (ref 0–0.04)
IMM GRANULOCYTES NFR BLD AUTO: ABNORMAL % (ref 0–0.5)
LYMPHOCYTES # BLD AUTO: ABNORMAL K/UL (ref 1–4.8)
LYMPHOCYTES NFR BLD: 3 % (ref 18–48)
MAGNESIUM SERPL-MCNC: 2 MG/DL (ref 1.6–2.6)
MCH RBC QN AUTO: 28.1 PG (ref 27–31)
MCHC RBC AUTO-ENTMCNC: 31.3 G/DL (ref 32–36)
MCV RBC AUTO: 90 FL (ref 82–98)
METAMYELOCYTES NFR BLD MANUAL: 3 %
MONOCYTES # BLD AUTO: ABNORMAL K/UL (ref 0.3–1)
MONOCYTES NFR BLD: 3 % (ref 4–15)
MYELOCYTES NFR BLD MANUAL: 1 %
NEUTROPHILS NFR BLD: 86 % (ref 38–73)
NRBC BLD-RTO: 0 /100 WBC
OVALOCYTES BLD QL SMEAR: ABNORMAL
PHOSPHATE SERPL-MCNC: 3.2 MG/DL (ref 2.7–4.5)
PLATELET # BLD AUTO: 223 K/UL (ref 150–350)
PMV BLD AUTO: 12.5 FL (ref 9.2–12.9)
POCT GLUCOSE: 101 MG/DL (ref 70–110)
POCT GLUCOSE: 109 MG/DL (ref 70–110)
POCT GLUCOSE: 110 MG/DL (ref 70–110)
POCT GLUCOSE: 112 MG/DL (ref 70–110)
POCT GLUCOSE: 115 MG/DL (ref 70–110)
POCT GLUCOSE: 127 MG/DL (ref 70–110)
POCT GLUCOSE: 66 MG/DL (ref 70–110)
POCT GLUCOSE: 91 MG/DL (ref 70–110)
POIKILOCYTOSIS BLD QL SMEAR: SLIGHT
POLYCHROMASIA BLD QL SMEAR: ABNORMAL
POTASSIUM SERPL-SCNC: 3.9 MMOL/L (ref 3.5–5.1)
PROT SERPL-MCNC: 4.9 G/DL (ref 6–8.4)
RBC # BLD AUTO: 3.6 M/UL (ref 4–5.4)
SODIUM SERPL-SCNC: 139 MMOL/L (ref 136–145)
TACROLIMUS BLD-MCNC: 1.7 NG/ML (ref 5–15)
VANCOMYCIN TROUGH SERPL-MCNC: 36.2 UG/ML (ref 10–22)
WBC # BLD AUTO: 15.7 K/UL (ref 3.9–12.7)

## 2020-09-27 PROCEDURE — 63600175 PHARM REV CODE 636 W HCPCS: Performed by: SURGERY

## 2020-09-27 PROCEDURE — P9047 ALBUMIN (HUMAN), 25%, 50ML: HCPCS | Mod: JG | Performed by: STUDENT IN AN ORGANIZED HEALTH CARE EDUCATION/TRAINING PROGRAM

## 2020-09-27 PROCEDURE — 84100 ASSAY OF PHOSPHORUS: CPT

## 2020-09-27 PROCEDURE — P9045 ALBUMIN (HUMAN), 5%, 250 ML: HCPCS | Mod: JG

## 2020-09-27 PROCEDURE — 99233 PR SUBSEQUENT HOSPITAL CARE,LEVL III: ICD-10-PCS | Mod: 24,,, | Performed by: SURGERY

## 2020-09-27 PROCEDURE — 63600175 PHARM REV CODE 636 W HCPCS: Performed by: INTERNAL MEDICINE

## 2020-09-27 PROCEDURE — C9113 INJ PANTOPRAZOLE SODIUM, VIA: HCPCS | Performed by: STUDENT IN AN ORGANIZED HEALTH CARE EDUCATION/TRAINING PROGRAM

## 2020-09-27 PROCEDURE — 94003 VENT MGMT INPAT SUBQ DAY: CPT

## 2020-09-27 PROCEDURE — 80197 ASSAY OF TACROLIMUS: CPT

## 2020-09-27 PROCEDURE — 27200966 HC CLOSED SUCTION SYSTEM

## 2020-09-27 PROCEDURE — 80202 ASSAY OF VANCOMYCIN: CPT

## 2020-09-27 PROCEDURE — 99233 SBSQ HOSP IP/OBS HIGH 50: CPT | Mod: 24,,, | Performed by: SURGERY

## 2020-09-27 PROCEDURE — 63600175 PHARM REV CODE 636 W HCPCS: Performed by: STUDENT IN AN ORGANIZED HEALTH CARE EDUCATION/TRAINING PROGRAM

## 2020-09-27 PROCEDURE — 25000003 PHARM REV CODE 250: Performed by: STUDENT IN AN ORGANIZED HEALTH CARE EDUCATION/TRAINING PROGRAM

## 2020-09-27 PROCEDURE — 85007 BL SMEAR W/DIFF WBC COUNT: CPT

## 2020-09-27 PROCEDURE — 83735 ASSAY OF MAGNESIUM: CPT

## 2020-09-27 PROCEDURE — 80053 COMPREHEN METABOLIC PANEL: CPT

## 2020-09-27 PROCEDURE — 94668 MNPJ CHEST WALL SBSQ: CPT

## 2020-09-27 PROCEDURE — 94761 N-INVAS EAR/PLS OXIMETRY MLT: CPT

## 2020-09-27 PROCEDURE — 25000003 PHARM REV CODE 250: Performed by: INTERNAL MEDICINE

## 2020-09-27 PROCEDURE — 36415 COLL VENOUS BLD VENIPUNCTURE: CPT

## 2020-09-27 PROCEDURE — 27000221 HC OXYGEN, UP TO 24 HOURS

## 2020-09-27 PROCEDURE — 99900035 HC TECH TIME PER 15 MIN (STAT)

## 2020-09-27 PROCEDURE — 20000000 HC ICU ROOM

## 2020-09-27 PROCEDURE — A4217 STERILE WATER/SALINE, 500 ML: HCPCS | Performed by: STUDENT IN AN ORGANIZED HEALTH CARE EDUCATION/TRAINING PROGRAM

## 2020-09-27 PROCEDURE — 99900026 HC AIRWAY MAINTENANCE (STAT)

## 2020-09-27 PROCEDURE — 63600175 PHARM REV CODE 636 W HCPCS: Mod: JG

## 2020-09-27 PROCEDURE — 85027 COMPLETE CBC AUTOMATED: CPT

## 2020-09-27 RX ORDER — ALBUMIN HUMAN 250 G/1000ML
25 SOLUTION INTRAVENOUS EVERY 8 HOURS
Status: DISCONTINUED | OUTPATIENT
Start: 2020-09-27 | End: 2020-09-29

## 2020-09-27 RX ORDER — ALBUMIN HUMAN 50 G/1000ML
25 SOLUTION INTRAVENOUS ONCE
Status: COMPLETED | OUTPATIENT
Start: 2020-09-27 | End: 2020-09-27

## 2020-09-27 RX ADMIN — ALBUMIN (HUMAN) 25 G: 12.5 SOLUTION INTRAVENOUS at 01:09

## 2020-09-27 RX ADMIN — SODIUM CHLORIDE, SODIUM LACTATE, POTASSIUM CHLORIDE, AND CALCIUM CHLORIDE: .6; .31; .03; .02 INJECTION, SOLUTION INTRAVENOUS at 01:09

## 2020-09-27 RX ADMIN — TACROLIMUS 0.5 MG: 0.5 CAPSULE ORAL at 06:09

## 2020-09-27 RX ADMIN — PANTOPRAZOLE SODIUM 40 MG: 40 INJECTION, POWDER, LYOPHILIZED, FOR SOLUTION INTRAVENOUS at 08:09

## 2020-09-27 RX ADMIN — PROPOFOL 20 MCG/KG/MIN: 10 INJECTION, EMULSION INTRAVENOUS at 02:09

## 2020-09-27 RX ADMIN — MEROPENEM 2 G: 1 INJECTION, POWDER, FOR SOLUTION INTRAVENOUS at 07:09

## 2020-09-27 RX ADMIN — LEVOTHYROXINE SODIUM ANHYDROUS 40 MCG: 100 INJECTION, POWDER, LYOPHILIZED, FOR SOLUTION INTRAVENOUS at 08:09

## 2020-09-27 RX ADMIN — PROPOFOL 40 MCG/KG/MIN: 10 INJECTION, EMULSION INTRAVENOUS at 09:09

## 2020-09-27 RX ADMIN — PROPOFOL 40 MCG/KG/MIN: 10 INJECTION, EMULSION INTRAVENOUS at 04:09

## 2020-09-27 RX ADMIN — MEROPENEM 2 G: 1 INJECTION, POWDER, FOR SOLUTION INTRAVENOUS at 03:09

## 2020-09-27 RX ADMIN — TACROLIMUS 0.5 MG: 0.5 CAPSULE ORAL at 08:09

## 2020-09-27 RX ADMIN — MICAFUNGIN SODIUM 100 MG: 20 INJECTION, POWDER, LYOPHILIZED, FOR SOLUTION INTRAVENOUS at 07:09

## 2020-09-27 RX ADMIN — HEPARIN SODIUM 5000 UNITS: 5000 INJECTION INTRAVENOUS; SUBCUTANEOUS at 09:09

## 2020-09-27 RX ADMIN — Medication 0.06 MCG/KG/MIN: at 08:09

## 2020-09-27 RX ADMIN — ALBUMIN (HUMAN) 25 G: 12.5 SOLUTION INTRAVENOUS at 09:09

## 2020-09-27 RX ADMIN — HEPARIN SODIUM 5000 UNITS: 5000 INJECTION INTRAVENOUS; SUBCUTANEOUS at 01:09

## 2020-09-27 RX ADMIN — MAGNESIUM SULFATE HEPTAHYDRATE: 500 INJECTION, SOLUTION INTRAMUSCULAR; INTRAVENOUS at 09:09

## 2020-09-27 RX ADMIN — MEROPENEM 2 G: 1 INJECTION, POWDER, FOR SOLUTION INTRAVENOUS at 11:09

## 2020-09-27 RX ADMIN — HEPARIN SODIUM 5000 UNITS: 5000 INJECTION INTRAVENOUS; SUBCUTANEOUS at 06:09

## 2020-09-27 RX ADMIN — ALBUMIN (HUMAN) 25 G: 12.5 SOLUTION INTRAVENOUS at 05:09

## 2020-09-27 NOTE — SUBJECTIVE & OBJECTIVE
Interval History: on low dose levophed. Made DNR this morning; still ok for medical management. Family coming into town.     Medications:  Continuous Infusions:   dexmedetomidine (PRECEDEX) infusion 0.2 mcg/kg/hr (09/26/20 2140)    lactated ringers 125 mL/hr at 09/27/20 0500    norepinephrine bitartrate-D5W 0.06 mcg/kg/min (09/27/20 1100)    propofoL 40 mcg/kg/min (09/27/20 1100)    TPN ADULT CENTRAL LINE CUSTOM 60 mL/hr at 09/27/20 0500    TPN ADULT CENTRAL LINE CUSTOM       Scheduled Meds:   albumin human 25%  25 g Intravenous Q8H    heparin (porcine)  5,000 Units Subcutaneous Q8H    lactated ringers  500 mL Intravenous Once    levothyroxine  40 mcg Intravenous Daily    meropenem (MERREM) IVPB  2 g Intravenous Q8H    micafungin (MYCAMINE) IVPB  100 mg Intravenous Q24H    pantoprazole  40 mg Intravenous Daily    tacrolimus  0.5 mg Sublingual BID     PRN Meds:sodium chloride, sodium chloride, acetaminophen, albuterol-ipratropium, calcium gluconate IVPB, calcium gluconate IVPB, calcium gluconate IVPB, dextrose 50%, dextrose 50%, glucagon (human recombinant), glucose, glucose, haloperidol lactate, HYDROmorphone, insulin aspart U-100, iohexol, labetaloL, magnesium sulfate IVPB, magnesium sulfate IVPB, ondansetron, potassium chloride in water **AND** potassium chloride in water **AND** potassium chloride in water, promethazine, sodium chloride 0.9%, sodium chloride 0.9%, sodium phosphate IVPB, sodium phosphate IVPB, sodium phosphate IVPB, Pharmacy to dose Vancomycin consult **AND** vancomycin - pharmacy to dose     Review of patient's allergies indicates:   Allergen Reactions    Codeine Itching     Other reaction(s): Itching    Lipitor [atorvastatin] Other (See Comments)     Other reaction(s): Muscle pain  Muscle cranmps    Morphine Itching     Other reaction(s): nausea and vomiting     Zoloft [sertraline] Other (See Comments)     Tremors/muscle spasms     Objective:     Vital Signs (Most  Recent):  Temp: 97.8 °F (36.6 °C) (09/27/20 1100)  Pulse: 75 (09/27/20 1115)  Resp: (!) 26 (09/27/20 1115)  BP: (!) 107/54 (09/27/20 1115)  SpO2: (!) 87 % (09/27/20 1115) Vital Signs (24h Range):  Temp:  [97.6 °F (36.4 °C)-98.8 °F (37.1 °C)] 97.8 °F (36.6 °C)  Pulse:  [51-75] 75  Resp:  [18-27] 26  SpO2:  [87 %-100 %] 87 %  BP: ()/(44-62) 107/54     Weight: 67.6 kg (149 lb)  Body mass index is 30.09 kg/m².    Intake/Output - Last 3 Shifts       09/25 0700 - 09/26 0659 09/26 0700 - 09/27 0659 09/27 0700 - 09/28 0659    I.V. (mL/kg) 666.4 (9.9) 3424 (50.7) 625 (9.2)    Blood  1200     NG/GT  0     IV Piggyback 800 2100     TPN 1454 1337 300    Total Intake(mL/kg) 2920.4 (43.2) 8061 (119.2) 925 (13.7)    Urine (mL/kg/hr) 1940 (1.2) 1135 (0.7) 185 (0.6)    Drains 815 444 199    Other 50 0 50    Stool       Total Output 2805 1579 434    Net +115.4 +6482 +491                 Physical Exam  Vitals signs and nursing note reviewed.   Constitutional:       Appearance: She is ill-appearing.   HENT:      Head: Normocephalic and atraumatic.      Nose:      Comments: NG tube in place     Mouth/Throat:      Mouth: Mucous membranes are dry.      Comments: Endotracheal tube in place  Eyes:      General: No scleral icterus.  Neck:      Comments: RIJ central line  Cardiovascular:      Rate and Rhythm: Normal rate and regular rhythm.      Heart sounds: Normal heart sounds.   Pulmonary:      Breath sounds: Rhonchi: bilateral.      Comments: Mechanically ventilated.  Abdominal:      General: There is distension.      Palpations: Abdomen is soft.      Comments: Midline incision with wound vac in place; excellent seal to wound vac.   L abd drains in place with mild serous output; G tube to gravity.   Genitourinary:     Comments: L groin with a line in place.  Bhardwaj in place  Musculoskeletal:      Comments: LUE weakness noted.   Skin:     General: Skin is warm and dry.      Coloration: Skin is not jaundiced.   Neurological:       Mental Status: She is alert.      Cranial Nerves: No cranial nerve deficit.      Sensory: No sensory deficit.      Motor: Weakness present.      Comments: Sedated         Significant Labs:  CBC:   Recent Labs   Lab 09/27/20  0400   WBC 15.70*   RBC 3.60*   HGB 10.1*   HCT 32.3*      MCV 90   MCH 28.1   MCHC 31.3*     CMP:   Recent Labs   Lab 09/27/20  0400      CALCIUM 7.9*   ALBUMIN 0.8*   PROT 4.9*      K 3.9   CO2 23      BUN 46*   CREATININE 0.8   ALKPHOS 174*   ALT 21   AST 39   BILITOT 1.8*       Significant Diagnostics:  I have reviewed all pertinent imaging results/findings within the past 24 hours.

## 2020-09-27 NOTE — PROGRESS NOTES
Pharmacokinetic Assessment Follow Up: IV Vancomycin    Vancomycin serum concentration assessment(s):  · Trough of 36.2 mcg/mL was drawn appropriately and will be used to guide therapy  · Supratherapeutic level for target of 15- 20 mcg/mL  · Renal function stable    Vancomycin Regimen Plan:  1. Will discontinue vancomycin order and switch to pulse dosing. Hold vancomycin dose today.  2. Obtain random level with AM labs 9/28. Consider re-dosing when level < 20.    Drug levels (last 3 results):  Recent Labs   Lab Result Units 09/27/20  0902   Vancomycin-Trough ug/mL 36.2*       Pharmacy will continue to follow and monitor vancomycin.    Please contact pharmacy at extension [phone] for questions regarding this assessment.    Thank you for the consult,   Azalia Mackey       Patient brief summary:  Elda Hernandez is a 50 y.o. female initiated on antimicrobial therapy with IV Vancomycin for treatment of lower respiratory infection    The patient's current regimen is 1000mg q12 hours    Drug Allergies:   Review of patient's allergies indicates:   Allergen Reactions    Codeine Itching     Other reaction(s): Itching    Lipitor [atorvastatin] Other (See Comments)     Other reaction(s): Muscle pain  Muscle cranmps    Morphine Itching     Other reaction(s): nausea and vomiting     Zoloft [sertraline] Other (See Comments)     Tremors/muscle spasms       Actual Body Weight:   67.6 kg    Renal Function:   Estimated Creatinine Clearance: 87.8 mL/min (based on SCr of 0.8 mg/dL).,       CBC (last 72 hours):  Recent Labs   Lab Result Units 09/25/20  0300 09/26/20  0646 09/26/20  1102 09/26/20  1746 09/27/20  0400   WBC K/uL 18.46* 16.62* 14.30* 11.46 15.70*   Hemoglobin g/dL 7.5* 6.0* 8.5* 8.7* 10.1*   Hematocrit % 24.6* 20.6* 27.1* 27.3* 32.3*   Platelets K/uL 229 205 170 167 223   Gran% % 87.0* 75.0* 93.0* 89.0* 86.0*   Lymph% % 2.5* 7.0* 2.0* 5.0* 3.0*   Mono% % 3.0* 8.0 3.0* 6.0 3.0*   Eosinophil% % 1.0 3.0 1.0 0.0 3.0    Basophil% % 1.0 1.0 0.0 0.0 1.0   Differential Method  Manual Manual Manual Manual Manual       Metabolic Panel (last 72 hours):  Recent Labs   Lab Result Units 09/25/20  0300 09/26/20  0646 09/27/20  0400   Sodium mmol/L 143 140 139   Potassium mmol/L 3.9 3.7 3.9   Chloride mmol/L 111* 109 109   CO2 mmol/L 21* 23 23   Glucose mg/dL 114* 110 109   BUN, Bld mg/dL 48* 54* 46*   Creatinine mg/dL 1.1 1.0 0.8   Albumin g/dL 1.2* 1.0* 0.8*   Total Bilirubin mg/dL 2.6* 1.9* 1.8*   Alkaline Phosphatase U/L 208* 191* 174*   AST U/L 38 42* 39   ALT U/L 23 24 21   Magnesium mg/dL 2.1  --  2.0   Phosphorus mg/dL 4.5  --  3.2       Vancomycin Administrations:  vancomycin given in the last 96 hours                   vancomycin in dextrose 5 % 1 gram/250 mL IVPB 1,000 mg (mg) 1,000 mg New Bag 09/26/20 2130     1,000 mg New Bag  1042    vancomycin 1.75 g in 5 % dextrose 500 mL IVPB (mg) 1,750 mg New Bag 09/25/20 1945                Microbiologic Results:  Microbiology Results (last 7 days)     Procedure Component Value Units Date/Time    Culture, VAP (BAL) [[id number]] Collected: 09/25/20 1238    Order Status: Completed Specimen: Bronchial Alveolar Lavage from BAL, LLL Updated: 09/26/20 0759     VAP BAL CULTURE No Growth     Gram Stain (Respiratory) <10 epithelial cells per low power field.     Gram Stain (Respiratory) Rare WBC's     Gram Stain (Respiratory) No organisms seen    Fungus culture [[id number]]  (Abnormal) Collected: 09/18/20 1544    Order Status: Completed Specimen: Abscess from Abdomen Updated: 09/24/20 0945     Fungus (Mycology) Culture YEAST   Rare  Identification pending      Narrative:      1. Inter-Abdominal Abscess for cultures  2. Inter-Abdominal Abscess for cultures    Culture, Anaerobe [[id number]]  (Abnormal) Collected: 09/18/20 1544    Order Status: Completed Specimen: Abscess from Abdomen Updated: 09/24/20 0706     Anaerobic Culture BACTEROIDES THETAIOTAOMICRON  Moderate      Narrative:      1.  Inter-Abdominal Abscess for cultures  2. Inter-Abdominal Abscess for cultures    Aerobic culture [[id number]]  (Abnormal)  (Susceptibility) Collected: 09/18/20 1544    Order Status: Completed Specimen: Abscess from Abdomen Updated: 09/21/20 1028     Aerobic Bacterial Culture KLEBSIELLA PNEUMONIAE  Many      Narrative:      1. Inter-Abdominal Abscess for cultures  2. Inter-Abdominal Abscess for cultures

## 2020-09-27 NOTE — SUBJECTIVE & OBJECTIVE
Interval History/Significant Events:   - Continued fluid resuscitation yesterday. 2L LR, 500mL 5% albumin  - Concentrated albumin scheduled Q8 for 3 doses.  - Levo @ .02   - Chest XR w/ worsening pulmonary edema and bilateral pleural effusions  - H/H stabilized     Follow-up For: Procedure(s) (LRB):  LAPAROTOMY, EXPLORATORY, DRAINAGE OF ABSCESS, REPAIR OF GASTRIC PERFORATION, GASTROSTOMY TUBE PLACEMENT (N/A)  APPLICATION, WOUND VAC (N/A)    Post-Operative Day: 9 Days Post-Op    Objective:     Vital Signs (Most Recent):  Temp: 97.6 °F (36.4 °C) (09/27/20 0300)  Pulse: 67 (09/27/20 0500)  Resp: (!) 21 (09/27/20 0500)  BP: (!) 106/51 (09/27/20 0500)  SpO2: (!) 90 % (09/27/20 0500) Vital Signs (24h Range):  Temp:  [97.6 °F (36.4 °C)-98.8 °F (37.1 °C)] 97.6 °F (36.4 °C)  Pulse:  [51-93] 67  Resp:  [18-27] 21  SpO2:  [90 %-100 %] 90 %  BP: ()/(48-66) 106/51     Weight: 67.6 kg (149 lb)  Body mass index is 30.09 kg/m².      Intake/Output Summary (Last 24 hours) at 9/27/2020 0748  Last data filed at 9/27/2020 0500  Gross per 24 hour   Intake 8061 ml   Output 1479 ml   Net 6582 ml       Physical Exam  Vitals signs and nursing note reviewed.   Constitutional:       Appearance: She is obese. She is ill-appearing.   HENT:      Head: Normocephalic and atraumatic.      Nose:      Comments: NG tube in place     Mouth/Throat:      Mouth: Mucous membranes are dry.      Comments: Endotracheal tube in place  Eyes:      General: No scleral icterus.  Neck:      Comments: RIJ central line  Cardiovascular:      Rate and Rhythm: Normal rate and regular rhythm.      Heart sounds: Normal heart sounds.   Pulmonary:      Breath sounds: Rhonchi: bilateral.      Comments: Mechanically ventilated.  Abdominal:      General: There is distension.      Palpations: Abdomen is soft.      Comments: Midline incision with wound vac in place; excellent seal to wound vac.   L abd drains in place with mild serous output; G tube to gravity.    Genitourinary:     Comments: Bhardwaj in place  Musculoskeletal:         General: Swelling present.      Right lower leg: Edema present.      Left lower leg: Edema present.      Comments: LUE weakness noted.   Skin:     General: Skin is warm and dry.      Coloration: Skin is not jaundiced.   Neurological:      Comments: Sedated         Vents:  Vent Mode: A/C (09/27/20 0342)  Ventilator Initiated: Yes (09/25/20 1221)  Set Rate: 18 BPM (09/27/20 0342)  Vt Set: 400 mL (09/27/20 0342)  Pressure Support: 5 cmH20 (09/22/20 0443)  PEEP/CPAP: 5 cmH20 (09/27/20 0342)  Oxygen Concentration (%): 80 (09/27/20 0500)  Peak Airway Pressure: 33 cmH2O (09/27/20 0342)  Plateau Pressure: 16 cmH20 (09/26/20 1548)  Total Ve: 8.67 mL (09/27/20 0342)  Negative Inspiratory Force (cm H2O): -31 (09/22/20 1021)  F/VT Ratio<105 (RSBI): (!) 46.3 (09/27/20 0342)    Lines/Drains/Airways     Central Venous Catheter Line            Percutaneous Central Line Insertion/Assessment - Triple Lumen  09/04/20 0800 right internal jugular 22 days          Drain                 Urethral Catheter 09/14/20 1330 12 days         Closed/Suction Drain 09/18/20 1555 Left Abdomen Bulb 19 Fr. 8 days         Closed/Suction Drain 09/18/20 1559 Left Abdomen Bulb 19 Fr. 8 days         Gastrostomy/Enterostomy 09/18/20 1612 Other (see comments) LUQ decompression;feeding 8 days         NG/OG Tube 09/18/20 1500 Right nostril 8 days          Airway                 Airway - Non-Surgical 09/25/20 1217 Endotracheal Tube 1 day          Peripheral Intravenous Line                 Peripheral IV - Single Lumen 09/20/20 1017 22 G Posterior;Right Hand 6 days                Significant Labs:    CBC/Anemia Profile:  Recent Labs   Lab 09/26/20  1102 09/26/20  1746 09/27/20  0400   WBC 14.30* 11.46 15.70*   HGB 8.5* 8.7* 10.1*   HCT 27.1* 27.3* 32.3*    167 223   MCV 92 89 90   RDW 15.5* 15.9* 16.5*        Chemistries:  Recent Labs   Lab 09/26/20  0646 09/27/20  0400    139    K 3.7 3.9    109   CO2 23 23   BUN 54* 46*   CREATININE 1.0 0.8   CALCIUM 8.0* 7.9*   ALBUMIN 1.0* 0.8*   PROT 5.4* 4.9*   BILITOT 1.9* 1.8*   ALKPHOS 191* 174*   ALT 24 21   AST 42* 39   MG  --  2.0   PHOS  --  3.2       All pertinent labs within the past 24 hours have been reviewed.    Significant Imaging:  I have reviewed all pertinent imaging results/findings within the past 24 hours.

## 2020-09-27 NOTE — ASSESSMENT & PLAN NOTE
50 y.o. female w/ free air on CT scan. S/p emergent ex lap on 9/4 w/ findings of gastric perforation, primary repair. Now w/  abdominal closure on 9/6, skin stapled closed, KERRY drains removed. Class A to OR on 9/18 for free air and extrav or oral contrast seen on CT. CT on 9/25 redemonstrated contrast leaking from stomach, but well-drained with surgical drain.    Continue SICU care, appreciate their assistance  No operative intervention for gastric leaking at this time. Leak controlled with elian drain. Tissue is so friable that repeat operative intervention around the G tube will not provide a long-term solution.   G tube and NG to gravity/suction  Wean vent settings as tolerated  Wound vac changes w/ wound care  Recommend ongoing goals of care discussion with the patient's  - DNR. Not yet comfort care.

## 2020-09-27 NOTE — ASSESSMENT & PLAN NOTE
Neuro:  - sedation: propofol @ 25  - analgesia: PRN oxy. Dilauded PRN  - Tylenol  - haldol q6hr PRN for agitation  - Stroke symptoms 9/25. CT head obtained. Vascular neurology consulted.     CV:   - Hypotensive requiring fluid resuscitation and low-dose pressors.  - Currently on .02 of Levo  - Fluid resuscitation w/ 2L LR bolus's, LR infusion @125/hr, and 500mL 5% albumin.  - femoral a-line placed 9/26 was removed due to dysfunction.   - Strict monitoring of BP w/ cuff    Pulm:   - Extubated 9/22  - Oxygen requirements increased acutely 9/25.Satting in the mid-90's on 10L NC O2  - Intubated 9/25 due to continued respiratory decline.   - Bronchoscopy w/ lavage performed  - Ventilator settings weaned to 80 O2 and 8 Peep.  - Chest XR this morning shows worsening pulmonary edema and bilateral pleural effusions. Possible need for drainage.  - Duonebs Q6 PRN     FEN/GI: Perforated stomach; s/p washout and patch (9/4); abdominal closure (9/6)  - s/p ex-lap on 9/19   - continue to hold tube feeds at this time  - NG tube, G tube, and L abdominal drains in place. Purulent output from bulb drain noted.  - G tube upsized  - Lactulose discontinued (9/14) in setting of normal ammonia level (9/10) and improving mental status  - Daily metabolic panel with PRN repletion of electrolytes  - pantoprazole for ulcer ppx  - Cont TPN  - LR @ 125/hr  - 2L LR bolus. 500mL 5% Albumin  - Concentrated albumin scheduled Q8     Renal:  - Continue to monitor with daily metabolic labs  - nephrology on board, appreciate recs  - Bun/Cr 46/0.8 today; continues to improve  - UOP appx 865 yesterday. NET +6L yesterday   - Bhardwaj in place; monitoring I/Os     HEME/ID:   - Afebrile  - H/H stabilized. Current 10.1/32.3  - Daily CBC  - s/p Liver transplant in 2002; Daily tacrolimus level with AM labs. Per hepatology, continue tacrolimus 1mg BID  - acetaminophen PRN  - heparin for DVT ppx  - Abdominal culture grew Klebsiella pneumonia; sensitive to zosyn  -  ABX regimen adjusted to Vanc, meropenum, and micafungin.    Endo:  - Synthroid 40mcg daily  - insulin aspart for blood glucose control    Dispo:  - Continue SICU care.

## 2020-09-27 NOTE — PLAN OF CARE
Plans for goals of care discussion with family    Continue vanc / trevor / della if consistent with goals of care    ID will sign off

## 2020-09-27 NOTE — PROGRESS NOTES
[facility]  Critical Care - Surgery  Progress Note    Patient Name: Elda Hernandez  MRN: [id number]  Admission Date: 9/3/2020  Hospital Length of Stay: 24 days  Code Status: Full Code  Attending Provider: Clark Rodriguez MD  Primary Care Provider: Jordana Woods MD   Principal Problem: Perforated abdominal viscus    Subjective:     Hospital/ICU Course:  9/4: Pt admitted to SICU following ex lap for GI perf. Intubated, sedated. Wound vac in place.  Soon after arrival to unit a mottled appearance to RUE was noted.  Vascular consulted and US revealed R radial artery thrombosis.    9/5: Tachycardic..  R arm appearance greatly improved overnight.    9/6: Patient returned to OR for abdominal washout, closure. R arm with dopplerable signals to ulnar artery and palmar arch.   9/7: Attempted to wean patient off of sedation in an effort to move towards extubation. Patient weaned off of propofol. Precedex started. Patient's heart rate very labile and sensitize to sedation.   9/8: patient extubated  9/9: SHANICE. Labetalol PRN for HTN   9/10:  Diuresis increased.  Cr downtrending  9/11: Increased abdominal distension and elevated WBC count. CT abd  9/12: WBC to 45 today. Diflucan and zosyn initiated. Lactulose enema w/ improving mental status  9/13: WBC improved. Mental status improving. FWF initiated for hypernatremia.   9/14: White count continues to improve.  Oriented to person only this morning.    9/17: tolerating tube feeds, half TPN rate today. Possible step down   9/18: possible fall out of bed overnight. High G tube residual, now to gravity. Requiring comfortflo  9/19: s/p exlap. Intubated ans sedated. Weaning to extubate  9/20: No acute events overnight. Pt remains intubated, sedated. Plan to wean sedation for extubation.  9/21: Abdominal cultures grew Klebsiella; pt on zosyn  9/22: Pt did well with SBT yesterday; back on rate overnight.  Will try again today with hopes to extubate  9/23:  Extubated yesterday; doing well with nasal canula  9/24: remains on 3L NC. Diuresed yesterday w/ 240mg Lasix. Continue diuresis today with 40 Lasix BID  9/25: Blown L pupil / LUE weakness upon exam. CT head performed. CT abd/pelvis ordered.  9/26: Remains intubated. Pressure soft and H/H noted to be 6. Fluid resuscitation initiated. A-line not reading or drawing back.  9/27: Intubated. Ventilator settings weaned minimally. Hypotensive throughout the day. Continued fluid resuscitation.     Interval History/Significant Events:   - Continued fluid resuscitation yesterday. 2L LR, 500mL 5% albumin  - Concentrated albumin scheduled Q8 for 3 doses.  - Levo @ .02   - Chest XR w/ worsening pulmonary edema and bilateral pleural effusions  - H/H stabilized     Follow-up For: Procedure(s) (LRB):  LAPAROTOMY, EXPLORATORY, DRAINAGE OF ABSCESS, REPAIR OF GASTRIC PERFORATION, GASTROSTOMY TUBE PLACEMENT (N/A)  APPLICATION, WOUND VAC (N/A)    Post-Operative Day: 9 Days Post-Op    Objective:     Vital Signs (Most Recent):  Temp: 97.6 °F (36.4 °C) (09/27/20 0300)  Pulse: 67 (09/27/20 0500)  Resp: (!) 21 (09/27/20 0500)  BP: (!) 106/51 (09/27/20 0500)  SpO2: (!) 90 % (09/27/20 0500) Vital Signs (24h Range):  Temp:  [97.6 °F (36.4 °C)-98.8 °F (37.1 °C)] 97.6 °F (36.4 °C)  Pulse:  [51-93] 67  Resp:  [18-27] 21  SpO2:  [90 %-100 %] 90 %  BP: ()/(48-66) 106/51     Weight: 67.6 kg (149 lb)  Body mass index is 30.09 kg/m².      Intake/Output Summary (Last 24 hours) at 9/27/2020 0748  Last data filed at 9/27/2020 0500  Gross per 24 hour   Intake 8061 ml   Output 1479 ml   Net 6582 ml       Physical Exam  Vitals signs and nursing note reviewed.   Constitutional:       Appearance: She is obese. She is ill-appearing.   HENT:      Head: Normocephalic and atraumatic.      Nose:      Comments: NG tube in place     Mouth/Throat:      Mouth: Mucous membranes are dry.      Comments: Endotracheal tube in place  Eyes:      General: No scleral  icterus.  Neck:      Comments: RIJ central line  Cardiovascular:      Rate and Rhythm: Normal rate and regular rhythm.      Heart sounds: Normal heart sounds.   Pulmonary:      Breath sounds: Rhonchi: bilateral.      Comments: Mechanically ventilated.  Abdominal:      General: There is distension.      Palpations: Abdomen is soft.      Comments: Midline incision with wound vac in place; excellent seal to wound vac.   L abd drains in place with mild serous output; G tube to gravity.   Genitourinary:     Comments: Bhardwaj in place  Musculoskeletal:         General: Swelling present.      Right lower leg: Edema present.      Left lower leg: Edema present.      Comments: LUE weakness noted.   Skin:     General: Skin is warm and dry.      Coloration: Skin is not jaundiced.   Neurological:      Comments: Sedated         Vents:  Vent Mode: A/C (09/27/20 0342)  Ventilator Initiated: Yes (09/25/20 1221)  Set Rate: 18 BPM (09/27/20 0342)  Vt Set: 400 mL (09/27/20 0342)  Pressure Support: 5 cmH20 (09/22/20 0443)  PEEP/CPAP: 5 cmH20 (09/27/20 0342)  Oxygen Concentration (%): 80 (09/27/20 0500)  Peak Airway Pressure: 33 cmH2O (09/27/20 0342)  Plateau Pressure: 16 cmH20 (09/26/20 1548)  Total Ve: 8.67 mL (09/27/20 0342)  Negative Inspiratory Force (cm H2O): -31 (09/22/20 1021)  F/VT Ratio<105 (RSBI): (!) 46.3 (09/27/20 0342)    Lines/Drains/Airways     Central Venous Catheter Line            Percutaneous Central Line Insertion/Assessment - Triple Lumen  09/04/20 0800 right internal jugular 22 days          Drain                 Urethral Catheter 09/14/20 1330 12 days         Closed/Suction Drain 09/18/20 1555 Left Abdomen Bulb 19 Fr. 8 days         Closed/Suction Drain 09/18/20 1559 Left Abdomen Bulb 19 Fr. 8 days         Gastrostomy/Enterostomy 09/18/20 1612 Other (see comments) LUQ decompression;feeding 8 days         NG/OG Tube 09/18/20 1500 Right nostril 8 days          Airway                 Airway - Non-Surgical 09/25/20 1217  Endotracheal Tube 1 day          Peripheral Intravenous Line                 Peripheral IV - Single Lumen 09/20/20 1017 22 G Posterior;Right Hand 6 days                Significant Labs:    CBC/Anemia Profile:  Recent Labs   Lab 09/26/20  1102 09/26/20  1746 09/27/20  0400   WBC 14.30* 11.46 15.70*   HGB 8.5* 8.7* 10.1*   HCT 27.1* 27.3* 32.3*    167 223   MCV 92 89 90   RDW 15.5* 15.9* 16.5*        Chemistries:  Recent Labs   Lab 09/26/20  0646 09/27/20  0400    139   K 3.7 3.9    109   CO2 23 23   BUN 54* 46*   CREATININE 1.0 0.8   CALCIUM 8.0* 7.9*   ALBUMIN 1.0* 0.8*   PROT 5.4* 4.9*   BILITOT 1.9* 1.8*   ALKPHOS 191* 174*   ALT 24 21   AST 42* 39   MG  --  2.0   PHOS  --  3.2       All pertinent labs within the past 24 hours have been reviewed.    Significant Imaging:  I have reviewed all pertinent imaging results/findings within the past 24 hours.    Assessment/Plan:     * Perforated abdominal viscus  Neuro:  - sedation: propofol @ 25  - analgesia: PRN oxy. Dilauded PRN  - Tylenol  - haldol q6hr PRN for agitation  - Stroke symptoms 9/25. CT head obtained. Vascular neurology consulted.     CV:   - Hypotensive requiring fluid resuscitation and low-dose pressors.  - Currently on .02 of Levo  - Fluid resuscitation w/ 2L LR bolus's, LR infusion @125/hr, and 500mL 5% albumin.  - femoral a-line placed 9/26 was removed due to dysfunction.   - Strict monitoring of BP w/ cuff    Pulm:   - Extubated 9/22  - Oxygen requirements increased acutely 9/25.Satting in the mid-90's on 10L NC O2  - Intubated 9/25 due to continued respiratory decline.   - Bronchoscopy w/ lavage performed  - Ventilator settings weaned to 80 O2 and 8 Peep.  - Chest XR this morning shows worsening pulmonary edema and bilateral pleural effusions. Possible need for drainage.  - Duonebs Q6 PRN     FEN/GI: Perforated stomach; s/p washout and patch (9/4); abdominal closure (9/6)  - s/p ex-lap on 9/19   - continue to hold tube feeds at  this time  - NG tube, G tube, and L abdominal drains in place. Purulent output from bulb drain noted.  - G tube upsized  - Lactulose discontinued (9/14) in setting of normal ammonia level (9/10) and improving mental status  - Daily metabolic panel with PRN repletion of electrolytes  - pantoprazole for ulcer ppx  - Cont TPN  - LR @ 125/hr  - 2L LR bolus. 500mL 5% Albumin  - Concentrated albumin scheduled Q8     Renal:  - Continue to monitor with daily metabolic labs  - nephrology on board, appreciate recs  - Bun/Cr 46/0.8 today; continues to improve  - UOP appx 865 yesterday. NET +6L yesterday   - Bhardwaj in place; monitoring I/Os     HEME/ID:   - Afebrile  - H/H stabilized. Current 10.1/32.3  - Daily CBC  - s/p Liver transplant in 2002; Daily tacrolimus level with AM labs. Per hepatology, continue tacrolimus 1mg BID  - acetaminophen PRN  - heparin for DVT ppx  - Abdominal culture grew Klebsiella pneumonia; sensitive to zosyn  - ABX regimen adjusted to Vanc, meropenum, and micafungin.    Endo:  - Synthroid 40mcg daily  - insulin aspart for blood glucose control    Dispo:  - Continue SICU care.      Critical care was time spent personally by me on the following activities: development of treatment plan with patient or surrogate and bedside caregivers, discussions with consultants, evaluation of patient's response to treatment, examination of patient, ordering and performing treatments and interventions, ordering and review of laboratory studies, ordering and review of radiographic studies, pulse oximetry, re-evaluation of patient's condition.  This critical care time did not overlap with that of any other provider or involve time for any procedures.     Khoa Damon MD  Critical Care - Surgery  [facility]

## 2020-09-27 NOTE — PROGRESS NOTES
[facility]  General Surgery  Progress Note    Subjective:     History of Present Illness:  Ms. Hernandez is a 51yo F w/PMH of von Gierke disease s/p liver transplant (2002, on tacro + MMF, follows Dr. Nielsen), hx chronic rejection (bx 2015 with acute and chronic rejection s/p steroids), biliary stricture and ductopenic rejection, recently with cirrhosis on fibroscan (10/2019), HTN, and depression who presents as a transfer for further evaluation of gastric outlet obstruction and esophageal stricturing.  Patient decompensated requiring multiple pressors and intubation. CT scan showed free air. Prior to intubation patient reported severe abdominal pain.   states that patient has had epigastric pain, nausea, and vomiting for several days.     Post-Op Info:  Procedure(s) (LRB):  LAPAROTOMY, EXPLORATORY, DRAINAGE OF ABSCESS, REPAIR OF GASTRIC PERFORATION, GASTROSTOMY TUBE PLACEMENT (N/A)  APPLICATION, WOUND VAC (N/A)   9 Days Post-Op     Interval History: on low dose levophed. Made DNR this morning; still ok for medical management. Family coming into town.     Medications:  Continuous Infusions:   dexmedetomidine (PRECEDEX) infusion 0.2 mcg/kg/hr (09/26/20 2140)    lactated ringers 125 mL/hr at 09/27/20 0500    norepinephrine bitartrate-D5W 0.06 mcg/kg/min (09/27/20 1100)    propofoL 40 mcg/kg/min (09/27/20 1100)    TPN ADULT CENTRAL LINE CUSTOM 60 mL/hr at 09/27/20 0500    TPN ADULT CENTRAL LINE CUSTOM       Scheduled Meds:   albumin human 25%  25 g Intravenous Q8H    heparin (porcine)  5,000 Units Subcutaneous Q8H    lactated ringers  500 mL Intravenous Once    levothyroxine  40 mcg Intravenous Daily    meropenem (MERREM) IVPB  2 g Intravenous Q8H    micafungin (MYCAMINE) IVPB  100 mg Intravenous Q24H    pantoprazole  40 mg Intravenous Daily    tacrolimus  0.5 mg Sublingual BID     PRN Meds:sodium chloride, sodium chloride, acetaminophen, albuterol-ipratropium, calcium gluconate  IVPB, calcium gluconate IVPB, calcium gluconate IVPB, dextrose 50%, dextrose 50%, glucagon (human recombinant), glucose, glucose, haloperidol lactate, HYDROmorphone, insulin aspart U-100, iohexol, labetaloL, magnesium sulfate IVPB, magnesium sulfate IVPB, ondansetron, potassium chloride in water **AND** potassium chloride in water **AND** potassium chloride in water, promethazine, sodium chloride 0.9%, sodium chloride 0.9%, sodium phosphate IVPB, sodium phosphate IVPB, sodium phosphate IVPB, Pharmacy to dose Vancomycin consult **AND** vancomycin - pharmacy to dose     Review of patient's allergies indicates:   Allergen Reactions    Codeine Itching     Other reaction(s): Itching    Lipitor [atorvastatin] Other (See Comments)     Other reaction(s): Muscle pain  Muscle cranmps    Morphine Itching     Other reaction(s): nausea and vomiting     Zoloft [sertraline] Other (See Comments)     Tremors/muscle spasms     Objective:     Vital Signs (Most Recent):  Temp: 97.8 °F (36.6 °C) (09/27/20 1100)  Pulse: 75 (09/27/20 1115)  Resp: (!) 26 (09/27/20 1115)  BP: (!) 107/54 (09/27/20 1115)  SpO2: (!) 87 % (09/27/20 1115) Vital Signs (24h Range):  Temp:  [97.6 °F (36.4 °C)-98.8 °F (37.1 °C)] 97.8 °F (36.6 °C)  Pulse:  [51-75] 75  Resp:  [18-27] 26  SpO2:  [87 %-100 %] 87 %  BP: ()/(44-62) 107/54     Weight: 67.6 kg (149 lb)  Body mass index is 30.09 kg/m².    Intake/Output - Last 3 Shifts       09/25 0700 - 09/26 0659 09/26 0700 - 09/27 0659 09/27 0700 - 09/28 0659    I.V. (mL/kg) 666.4 (9.9) 3424 (50.7) 625 (9.2)    Blood  1200     NG/GT  0     IV Piggyback 800 2100     TPN 1454 1337 300    Total Intake(mL/kg) 2920.4 (43.2) 8061 (119.2) 925 (13.7)    Urine (mL/kg/hr) 1940 (1.2) 1135 (0.7) 185 (0.6)    Drains 815 444 199    Other 50 0 50    Stool       Total Output 2805 1579 434    Net +115.4 +6482 +491                 Physical Exam  Vitals signs and nursing note reviewed.   Constitutional:       Appearance: She is  ill-appearing.   HENT:      Head: Normocephalic and atraumatic.      Nose:      Comments: NG tube in place     Mouth/Throat:      Mouth: Mucous membranes are dry.      Comments: Endotracheal tube in place  Eyes:      General: No scleral icterus.  Neck:      Comments: RIJ central line  Cardiovascular:      Rate and Rhythm: Normal rate and regular rhythm.      Heart sounds: Normal heart sounds.   Pulmonary:      Breath sounds: Rhonchi: bilateral.      Comments: Mechanically ventilated.  Abdominal:      General: There is distension.      Palpations: Abdomen is soft.      Comments: Midline incision with wound vac in place; excellent seal to wound vac.   L abd drains in place with mild serous output; G tube to gravity.   Genitourinary:     Comments: L groin with a line in place.  Bhardwaj in place  Musculoskeletal:      Comments: LUE weakness noted.   Skin:     General: Skin is warm and dry.      Coloration: Skin is not jaundiced.   Neurological:      Mental Status: She is alert.      Cranial Nerves: No cranial nerve deficit.      Sensory: No sensory deficit.      Motor: Weakness present.      Comments: Sedated         Significant Labs:  CBC:   Recent Labs   Lab 09/27/20  0400   WBC 15.70*   RBC 3.60*   HGB 10.1*   HCT 32.3*      MCV 90   MCH 28.1   MCHC 31.3*     CMP:   Recent Labs   Lab 09/27/20  0400      CALCIUM 7.9*   ALBUMIN 0.8*   PROT 4.9*      K 3.9   CO2 23      BUN 46*   CREATININE 0.8   ALKPHOS 174*   ALT 21   AST 39   BILITOT 1.8*       Significant Diagnostics:  I have reviewed all pertinent imaging results/findings within the past 24 hours.    Assessment/Plan:     * Perforated abdominal viscus  50 y.o. female w/ free air on CT scan. S/p emergent ex lap on 9/4 w/ findings of gastric perforation, primary repair. Now w/  abdominal closure on 9/6, skin stapled closed, KERRY drains removed. Class A to OR on 9/18 for free air and extrav or oral contrast seen on CT. CT on 9/25 redemonstrated  contrast leaking from stomach, but well-drained with surgical drain.    Continue SICU care, appreciate their assistance  No operative intervention for gastric leaking at this time. Leak controlled with elian drain. Tissue is so friable that repeat operative intervention around the G tube will not provide a long-term solution.   G tube and NG to gravity/suction  Wean vent settings as tolerated  Wound vac changes w/ wound care  Recommend ongoing goals of care discussion with the patient's  - DNR. Not yet comfort care.        Constance Martin MD  General Surgery  [facility]

## 2020-09-27 NOTE — CARE UPDATE
"Attempted to contact  to discuss patient's clinical status, straight to voicemail. Message left. Will attempt again later today.     Carl Pino MD  Resident Physician, PGY3  Department of Anesthesiology/SICU     Addendum -    Spoke with  via phone. I expressed our concerns that patient is clinically deteriorating. No surgical options for gastric leak. I expressed to the  that if her clinical trajectory continues, she will likely pass away in a matter of days. 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 Patient now DNR - no compressions, no cardioversion. We will continue aggressive medical management of patient including increasing pressors as needed and ventilation requirements.  plans to visit patient in person today with father in law and daughter. We will continue goals of care discussions at that time.     Second addendum (1:35PM)     Family gathered at bedside. Patient's father, step-mother, cousin, , and daughter present. I discussed with them what I told  over the phone - patient clinically declining, gastric leak that cannot be surgically repaired, on moderate ventilator settings and moderate blood pressure support. Step mother had questions regarding etiology of initial perforated peptic ulcer which I explained could be multifactorial.  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 I explained that while I cannot say definitively she will worsen, given her underlying condition and comorbidities, it seems unlikely she will be able to recover. I explained that current norepi dose (0.06) while significant, is not a very high dose, and given the family's current wishes to continue medical " management, I suggested that if pressor requirement continues to increase and we are needing a second drip, then we should revisit our goals of care and considering transitioning to comfort care. Family was in agreement with this decision. Patient is still DNR - no compressions, no cardioversion.

## 2020-09-28 LAB
ALBUMIN SERPL BCP-MCNC: 2.2 G/DL (ref 3.5–5.2)
ALP SERPL-CCNC: 204 U/L (ref 55–135)
ALT SERPL W/O P-5'-P-CCNC: 18 U/L (ref 10–44)
ANION GAP SERPL CALC-SCNC: 10 MMOL/L (ref 8–16)
ANISOCYTOSIS BLD QL SMEAR: SLIGHT
AST SERPL-CCNC: 37 U/L (ref 10–40)
BACTERIA BAL AEROBE CULT: NORMAL
BASOPHILS # BLD AUTO: ABNORMAL K/UL (ref 0–0.2)
BASOPHILS NFR BLD: 0 % (ref 0–1.9)
BILIRUB SERPL-MCNC: 3.6 MG/DL (ref 0.1–1)
BUN SERPL-MCNC: 44 MG/DL (ref 6–20)
BURR CELLS BLD QL SMEAR: ABNORMAL
CALCIUM SERPL-MCNC: 8.3 MG/DL (ref 8.7–10.5)
CHLORIDE SERPL-SCNC: 105 MMOL/L (ref 95–110)
CO2 SERPL-SCNC: 22 MMOL/L (ref 23–29)
CREAT SERPL-MCNC: 0.7 MG/DL (ref 0.5–1.4)
DIFFERENTIAL METHOD: ABNORMAL
EOSINOPHIL # BLD AUTO: ABNORMAL K/UL (ref 0–0.5)
EOSINOPHIL NFR BLD: 4 % (ref 0–8)
ERYTHROCYTE [DISTWIDTH] IN BLOOD BY AUTOMATED COUNT: 16.7 % (ref 11.5–14.5)
EST. GFR  (AFRICAN AMERICAN): >60 ML/MIN/1.73 M^2
EST. GFR  (NON AFRICAN AMERICAN): >60 ML/MIN/1.73 M^2
GLUCOSE SERPL-MCNC: 103 MG/DL (ref 70–110)
GRAM STN SPEC: NORMAL
HCT VFR BLD AUTO: 29.6 % (ref 37–48.5)
HGB BLD-MCNC: 9.3 G/DL (ref 12–16)
HYPOCHROMIA BLD QL SMEAR: ABNORMAL
IMM GRANULOCYTES # BLD AUTO: ABNORMAL K/UL (ref 0–0.04)
IMM GRANULOCYTES NFR BLD AUTO: ABNORMAL % (ref 0–0.5)
LYMPHOCYTES # BLD AUTO: ABNORMAL K/UL (ref 1–4.8)
LYMPHOCYTES NFR BLD: 3 % (ref 18–48)
MAGNESIUM SERPL-MCNC: 2 MG/DL (ref 1.6–2.6)
MCH RBC QN AUTO: 28.6 PG (ref 27–31)
MCHC RBC AUTO-ENTMCNC: 31.4 G/DL (ref 32–36)
MCV RBC AUTO: 91 FL (ref 82–98)
MONOCYTES # BLD AUTO: ABNORMAL K/UL (ref 0.3–1)
MONOCYTES NFR BLD: 3 % (ref 4–15)
NEUTROPHILS NFR BLD: 89 % (ref 38–73)
NEUTS BAND NFR BLD MANUAL: 1 %
NRBC BLD-RTO: 0 /100 WBC
PHOSPHATE SERPL-MCNC: 2.9 MG/DL (ref 2.7–4.5)
PLATELET # BLD AUTO: 258 K/UL (ref 150–350)
PLATELET BLD QL SMEAR: ABNORMAL
PMV BLD AUTO: 12.5 FL (ref 9.2–12.9)
POCT GLUCOSE: 121 MG/DL (ref 70–110)
POCT GLUCOSE: 122 MG/DL (ref 70–110)
POCT GLUCOSE: 79 MG/DL (ref 70–110)
POIKILOCYTOSIS BLD QL SMEAR: SLIGHT
POLYCHROMASIA BLD QL SMEAR: ABNORMAL
POTASSIUM SERPL-SCNC: 3.5 MMOL/L (ref 3.5–5.1)
PROT SERPL-MCNC: 5.4 G/DL (ref 6–8.4)
RBC # BLD AUTO: 3.25 M/UL (ref 4–5.4)
SODIUM SERPL-SCNC: 137 MMOL/L (ref 136–145)
TACROLIMUS BLD-MCNC: 2 NG/ML (ref 5–15)
VANCOMYCIN SERPL-MCNC: 21.8 UG/ML
WBC # BLD AUTO: 15.95 K/UL (ref 3.9–12.7)

## 2020-09-28 PROCEDURE — 94003 VENT MGMT INPAT SUBQ DAY: CPT

## 2020-09-28 PROCEDURE — 85007 BL SMEAR W/DIFF WBC COUNT: CPT

## 2020-09-28 PROCEDURE — 63600175 PHARM REV CODE 636 W HCPCS: Performed by: INTERNAL MEDICINE

## 2020-09-28 PROCEDURE — 63600175 PHARM REV CODE 636 W HCPCS: Performed by: STUDENT IN AN ORGANIZED HEALTH CARE EDUCATION/TRAINING PROGRAM

## 2020-09-28 PROCEDURE — 20000000 HC ICU ROOM

## 2020-09-28 PROCEDURE — 63600175 PHARM REV CODE 636 W HCPCS: Mod: JG | Performed by: STUDENT IN AN ORGANIZED HEALTH CARE EDUCATION/TRAINING PROGRAM

## 2020-09-28 PROCEDURE — 94761 N-INVAS EAR/PLS OXIMETRY MLT: CPT

## 2020-09-28 PROCEDURE — 63600175 PHARM REV CODE 636 W HCPCS: Performed by: SURGERY

## 2020-09-28 PROCEDURE — 99291 PR CRITICAL CARE, E/M 30-74 MINUTES: ICD-10-PCS | Mod: 24,GC,, | Performed by: STUDENT IN AN ORGANIZED HEALTH CARE EDUCATION/TRAINING PROGRAM

## 2020-09-28 PROCEDURE — 80197 ASSAY OF TACROLIMUS: CPT

## 2020-09-28 PROCEDURE — 84100 ASSAY OF PHOSPHORUS: CPT

## 2020-09-28 PROCEDURE — 80053 COMPREHEN METABOLIC PANEL: CPT

## 2020-09-28 PROCEDURE — 80202 ASSAY OF VANCOMYCIN: CPT

## 2020-09-28 PROCEDURE — 99900026 HC AIRWAY MAINTENANCE (STAT)

## 2020-09-28 PROCEDURE — 94668 MNPJ CHEST WALL SBSQ: CPT

## 2020-09-28 PROCEDURE — P9047 ALBUMIN (HUMAN), 25%, 50ML: HCPCS | Mod: JG | Performed by: STUDENT IN AN ORGANIZED HEALTH CARE EDUCATION/TRAINING PROGRAM

## 2020-09-28 PROCEDURE — 99291 CRITICAL CARE FIRST HOUR: CPT | Mod: 24,GC,, | Performed by: STUDENT IN AN ORGANIZED HEALTH CARE EDUCATION/TRAINING PROGRAM

## 2020-09-28 PROCEDURE — 25000003 PHARM REV CODE 250: Performed by: STUDENT IN AN ORGANIZED HEALTH CARE EDUCATION/TRAINING PROGRAM

## 2020-09-28 PROCEDURE — 83735 ASSAY OF MAGNESIUM: CPT

## 2020-09-28 PROCEDURE — 25000003 PHARM REV CODE 250: Performed by: SURGERY

## 2020-09-28 PROCEDURE — A4217 STERILE WATER/SALINE, 500 ML: HCPCS | Performed by: SURGERY

## 2020-09-28 PROCEDURE — C9113 INJ PANTOPRAZOLE SODIUM, VIA: HCPCS | Performed by: STUDENT IN AN ORGANIZED HEALTH CARE EDUCATION/TRAINING PROGRAM

## 2020-09-28 PROCEDURE — 27000221 HC OXYGEN, UP TO 24 HOURS

## 2020-09-28 PROCEDURE — 85027 COMPLETE CBC AUTOMATED: CPT

## 2020-09-28 PROCEDURE — 25000003 PHARM REV CODE 250: Performed by: INTERNAL MEDICINE

## 2020-09-28 PROCEDURE — 99900035 HC TECH TIME PER 15 MIN (STAT)

## 2020-09-28 RX ADMIN — Medication 0.05 MCG/KG/MIN: at 12:09

## 2020-09-28 RX ADMIN — TACROLIMUS 0.5 MG: 0.5 CAPSULE ORAL at 06:09

## 2020-09-28 RX ADMIN — VANCOMYCIN HYDROCHLORIDE 750 MG: 750 INJECTION, POWDER, LYOPHILIZED, FOR SOLUTION INTRAVENOUS at 08:09

## 2020-09-28 RX ADMIN — TACROLIMUS 0.5 MG: 0.5 CAPSULE ORAL at 09:09

## 2020-09-28 RX ADMIN — MEROPENEM 2 G: 1 INJECTION, POWDER, FOR SOLUTION INTRAVENOUS at 02:09

## 2020-09-28 RX ADMIN — HEPARIN SODIUM 5000 UNITS: 5000 INJECTION INTRAVENOUS; SUBCUTANEOUS at 05:09

## 2020-09-28 RX ADMIN — PANTOPRAZOLE SODIUM 40 MG: 40 INJECTION, POWDER, LYOPHILIZED, FOR SOLUTION INTRAVENOUS at 09:09

## 2020-09-28 RX ADMIN — MEROPENEM 2 G: 1 INJECTION, POWDER, FOR SOLUTION INTRAVENOUS at 09:09

## 2020-09-28 RX ADMIN — PROPOFOL 40 MCG/KG/MIN: 10 INJECTION, EMULSION INTRAVENOUS at 12:09

## 2020-09-28 RX ADMIN — PROPOFOL 40 MCG/KG/MIN: 10 INJECTION, EMULSION INTRAVENOUS at 05:09

## 2020-09-28 RX ADMIN — HEPARIN SODIUM 5000 UNITS: 5000 INJECTION INTRAVENOUS; SUBCUTANEOUS at 09:09

## 2020-09-28 RX ADMIN — MAGNESIUM SULFATE HEPTAHYDRATE: 500 INJECTION, SOLUTION INTRAMUSCULAR; INTRAVENOUS at 09:09

## 2020-09-28 RX ADMIN — LEVOTHYROXINE SODIUM ANHYDROUS 40 MCG: 100 INJECTION, POWDER, LYOPHILIZED, FOR SOLUTION INTRAVENOUS at 09:09

## 2020-09-28 RX ADMIN — ALBUMIN (HUMAN) 25 G: 12.5 SOLUTION INTRAVENOUS at 05:09

## 2020-09-28 RX ADMIN — PROPOFOL 40 MCG/KG/MIN: 10 INJECTION, EMULSION INTRAVENOUS at 06:09

## 2020-09-28 RX ADMIN — HEPARIN SODIUM 5000 UNITS: 5000 INJECTION INTRAVENOUS; SUBCUTANEOUS at 01:09

## 2020-09-28 RX ADMIN — ALBUMIN (HUMAN) 25 G: 12.5 SOLUTION INTRAVENOUS at 09:09

## 2020-09-28 RX ADMIN — MEROPENEM 2 G: 1 INJECTION, POWDER, FOR SOLUTION INTRAVENOUS at 03:09

## 2020-09-28 RX ADMIN — POTASSIUM CHLORIDE 40 MEQ: 29.8 INJECTION, SOLUTION INTRAVENOUS at 05:09

## 2020-09-28 RX ADMIN — PROPOFOL 40 MCG/KG/MIN: 10 INJECTION, EMULSION INTRAVENOUS at 09:09

## 2020-09-28 RX ADMIN — MICAFUNGIN SODIUM 100 MG: 20 INJECTION, POWDER, LYOPHILIZED, FOR SOLUTION INTRAVENOUS at 07:09

## 2020-09-28 RX ADMIN — ALBUMIN (HUMAN) 25 G: 12.5 SOLUTION INTRAVENOUS at 01:09

## 2020-09-28 NOTE — PROGRESS NOTES
PLAN OF CARE - SHIFT NOTE      Patient is ANOx4, on RA (2L @ [address]), tele NSR, cont B/B, UAL. Patient had [facility] cath today - R Groin perclose. Site is soft, no s/s hematoma, gauze/tegaderm are C/D/I no drainage noted.  Spasm noted in diagonal - medical manageme Patient seen for wound care follow up for wound vac dressing change to midline abdomen. Wound vac dressing changed. Cleansed with sterile normal saline, cavilon skin barrier to periwound, packed with one black sponge, seal obtained at 125mmHg. Patient tolerated care well. Wound care to change wound vac twice a week. Wound care to follow prn.    Upper midline abdomen: Full thickness skin loss, moist red granular tissue, scant fibrinous tissue, no drainage or odor. Staples noted to lower midline abdominal incision.

## 2020-09-28 NOTE — PROGRESS NOTES
"[facility]  Adult Nutrition  Progress Note    SUMMARY       Recommendations    1.) Change TPN to 150g of dextrose, 70gm of AA. TPN provides 1220kcal with propofol.    Goals: 1. Pt's intake to meet % EEN and EPN by RD follow up.  Nutrition Goal Status: progressing towards goal  Communication of RD Recs: (POC)    Reason for Assessment    Reason For Assessment: RD follow-up  Diagnosis: (gastric outlet obstruction)  Relevant Medical History: Esophageal stricture; HTN; Liver fibrosis; Seizures; SCC; HTN; S/p liver tx  Interdisciplinary Rounds: did not attend  General Information Comments: NFPE completed 9/6, pt meets criteria for moderate malnutrition. Pt intubated, sedated. Pt s/p gastric perforation. GI- LBM 9/25; s/p PEG. TPN at 60mL/hr  Nutrition Discharge Planning: unable to determine    Nutrition Risk Screen    Nutrition Risk Screen: tube feeding or parenteral nutrition    Nutrition/Diet History    Spiritual, Cultural Beliefs, Shinto Practices, Values that Affect Care: no  Food Allergies: NKFA  Factors Affecting Nutritional Intake: altered gastrointestinal function, NPO, on mechanical ventilation    Anthropometrics    Temp: 98.6 °F (37 °C)  Height Method: Stated  Height: 4' 11" (149.9 cm)  Height (inches): 59 in  Weight Method: Bed Scale  Weight: 67.6 kg (149 lb)  Weight (lb): 149 lb  Ideal Body Weight (IBW), Female: 95 lb  % Ideal Body Weight, Female (lb): 156.84 %  BMI (Calculated): 30.1  BMI Grade: 25 - 29.9 - overweight       Lab/Procedures/Meds    Pertinent Labs Reviewed: reviewed  Pertinent Labs Comments: WBC 15.95, BUN 44, Ca 8.3, Alb 2.2  Pertinent Medications Reviewed: reviewed  Pertinent Medications Comments: Albumin, Levothyroxine, meropenem, micafungin, pantoprazole, tacrolimus, vancomycin, precedex    Physical Findings/Assessment     Edema 3+ generalized    Estimated/Assessed Needs    Weight Used For Calorie Calculations: 58 kg (127 lb 13.9 oz)  Energy Calorie Requirements " (kcal): 7714-1244  Energy Need Method: Kcal/kg(20-25kcal/kg)  Protein Requirements: 70-87g  Weight Used For Protein Calculations: 58 kg (127 lb 13.9 oz)  Fluid Requirements (mL): 1 mL/kcal or per MD     RDA Method (mL): 1160         Nutrition Prescription Ordered    Current Diet Order: NPO (9/4)  Nutrition Order Comments: (none)  Current Nutrition Support Formula Ordered: (Custom TPN)  Current Nutrition Support Rate Ordered: 60 (ml)  Current Nutrition Support Frequency Ordered: mL/hr    Evaluation of Received Nutrient/Fluid Intake    Enteral Calories (kcal): 0  Enteral Protein (gm): 0  Enteral (Free Water) Fluid (mL): 0  Parenteral Calories (kcal): 620  Parenteral Protein (gm): 70  Parenteral Fluid (mL): 1440  Lipid Calories (kcals): 0 kcals  GIR (Glucose Infusion Rate) (mg/kg/min): 1 mg/kg/min  Other Calories (kcal): 430  Total Calories (kcal): 1050  Total Calories (kcal/kg): 15  % Kcal Needs: 90  Total Protein (gm): 70  Total Protein (gm/kg): (NA)  % Protein Needs: 100  I/O: +23.6L sicne 9/14  Energy Calories Required: not meeting needs  Protein Required: meeting needs  Fluid Required: meeting needs  Comments: LBM 9/25  Tolerance: tolerating  % Intake of Estimated Energy Needs: 90  % Meal Intake: 0    Nutrition Risk    Level of Risk/Frequency of Follow-up: high , 2x weekly    Assessment and Plan     Moderate Protein-Calorie Malnutrition  Malnutrition in the context of Acute Illness/Injury     Related to (etiology):  Decreased intake      Signs and Symptoms (as evidenced by):  Energy Intake: <50% of estimated energy requirement for >3 weeks   Muscle Mass Depletion: mild depletion of temples   Weight Loss: 11% x 3 weeks per family   Fluid Accumulation: moderate     Interventions(treatment strategy):  Collaboration of care with other providers     Nutrition Diagnosis Status:  ongoing      Monitor and Evaluation    Food and Nutrient Intake: energy intake, food and beverage intake, enteral nutrition intake  Food and  Nutrient Adminstration: enteral and parenteral nutrition administration, diet order  Knowledge/Beliefs/Attitudes: food and nutrition knowledge/skill  Anthropometric Measurements: weight, weight change  Biochemical Data, Medical Tests and Procedures: electrolyte and renal panel, gastrointestinal profile, glucose/endocrine profile, inflammatory profile, lipid profile  Nutrition-Focused Physical Findings: overall appearance     Malnutrition Assessment  Malnutrition Type: acute illness or injury          Weight Loss (Malnutrition): (11% x 3 weeks)  Energy Intake (Malnutrition): less than or equal to 50% for greater than or equal to 1 month   Orbital Region (Subcutaneous Fat Loss): mild depletion  Upper Arm Region (Subcutaneous Fat Loss): well nourished  Thoracic and Lumbar Region: well nourished   Meridian Region (Muscle Loss): moderate depletion  Clavicle Bone Region (Muscle Loss): mild depletion  Clavicle and Acromion Bone Region (Muscle Loss): well nourished  Patellar Region (Muscle Loss): well nourished  Anterior Thigh Region (Muscle Loss): well nourished  Posterior Calf Region (Muscle Loss): well nourished   Edema (Fluid Accumulation): 2-->mild(lower extremities)             Nutrition Follow-Up    RD Follow-up?: Yes

## 2020-09-28 NOTE — PROGRESS NOTES
[facility]  General Surgery  Progress Note    Subjective:     History of Present Illness:  Ms. Hernandez is a 51yo F w/PMH of von Gierke disease s/p liver transplant (2002, on tacro + MMF, follows Dr. Nielsen), hx chronic rejection (bx 2015 with acute and chronic rejection s/p steroids), biliary stricture and ductopenic rejection, recently with cirrhosis on fibroscan (10/2019), HTN, and depression who presents as a transfer for further evaluation of gastric outlet obstruction and esophageal stricturing.  Patient decompensated requiring multiple pressors and intubation. CT scan showed free air. Prior to intubation patient reported severe abdominal pain.   states that patient has had epigastric pain, nausea, and vomiting for several days.     Post-Op Info:  Procedure(s) (LRB):  LAPAROTOMY, EXPLORATORY, DRAINAGE OF ABSCESS, REPAIR OF GASTRIC PERFORATION, GASTROSTOMY TUBE PLACEMENT (N/A)  APPLICATION, WOUND VAC (N/A)   10 Days Post-Op     Interval History: No acute events overnight.  Remains on low dose levophed. DNR; still ok for medical management.     Medications:  Continuous Infusions:   lactated ringers 65 mL/hr at 09/28/20 0900    norepinephrine bitartrate-D5W 0.05 mcg/kg/min (09/28/20 0900)    propofoL 40 mcg/kg/min (09/28/20 0900)    TPN ADULT CENTRAL LINE CUSTOM 60 mL/hr at 09/28/20 0900    TPN ADULT CENTRAL LINE CUSTOM       Scheduled Meds:   albumin human 25%  25 g Intravenous Q8H    heparin (porcine)  5,000 Units Subcutaneous Q8H    levothyroxine  40 mcg Intravenous Daily    meropenem (MERREM) IVPB  2 g Intravenous Q8H    micafungin (MYCAMINE) IVPB  100 mg Intravenous Q24H    pantoprazole  40 mg Intravenous Daily    tacrolimus  0.5 mg Sublingual BID     PRN Meds:acetaminophen, albuterol-ipratropium, calcium gluconate IVPB, calcium gluconate IVPB, calcium gluconate IVPB, dextrose 50%, dextrose 50%, glucagon (human recombinant), glucose, glucose, haloperidol lactate,  HYDROmorphone, insulin aspart U-100, iohexol, labetaloL, magnesium sulfate IVPB, magnesium sulfate IVPB, ondansetron, potassium chloride in water **AND** potassium chloride in water **AND** potassium chloride in water, promethazine, sodium chloride 0.9%, sodium phosphate IVPB, sodium phosphate IVPB, sodium phosphate IVPB, Pharmacy to dose Vancomycin consult **AND** vancomycin - pharmacy to dose     Review of patient's allergies indicates:   Allergen Reactions    Codeine Itching     Other reaction(s): Itching    Lipitor [atorvastatin] Other (See Comments)     Other reaction(s): Muscle pain  Muscle cranmps    Morphine Itching     Other reaction(s): nausea and vomiting     Zoloft [sertraline] Other (See Comments)     Tremors/muscle spasms     Objective:     Vital Signs (Most Recent):  Temp: 98.6 °F (37 °C) (09/28/20 0800)  Pulse: 83 (09/28/20 1111)  Resp: 20 (09/28/20 1111)  BP: (!) 96/49 (09/28/20 1111)  SpO2: 99 % (09/28/20 1111) Vital Signs (24h Range):  Temp:  [97.8 °F (36.6 °C)-99 °F (37.2 °C)] 98.6 °F (37 °C)  Pulse:  [73-93] 83  Resp:  [13-38] 20  SpO2:  [91 %-100 %] 99 %  BP: ()/(36-87) 96/49     Weight: 67.6 kg (149 lb)  Body mass index is 30.09 kg/m².    Intake/Output - Last 3 Shifts       09/26 0700 - 09/27 0659 09/27 0700 - 09/28 0659 09/28 0700 - 09/29 0659    I.V. (mL/kg) 3424 (50.7) 6538.2 (96.7)     Blood 1200 100     NG/GT 0      IV Piggyback 2100      TPN 1337 2212.4     Total Intake(mL/kg) 8061 (119.2) 8850.6 (130.9)     Urine (mL/kg/hr) 1135 (0.7) 1065 (0.7) 320 (1)    Drains 444 581 105    Other 0 50 0    Total Output 1579 1696 425    Net +6482 +7154.6 -425                 Physical Exam  Vitals signs and nursing note reviewed.   Constitutional:       Appearance: She is ill-appearing.   HENT:      Head: Normocephalic and atraumatic.      Nose:      Comments: NG tube in place     Mouth/Throat:      Mouth: Mucous membranes are dry.      Comments: Endotracheal tube in place  Eyes:       General: No scleral icterus.  Neck:      Comments: RIJ central line  Cardiovascular:      Rate and Rhythm: Normal rate and regular rhythm.      Heart sounds: Normal heart sounds.   Pulmonary:      Breath sounds: Rhonchi: bilateral.      Comments: Mechanically ventilated.  Abdominal:      General: There is distension.      Palpations: Abdomen is soft.      Comments: Midline incision with wound vac in place; excellent seal to wound vac.   L abd drains in place with mild serous output; G tube to gravity.   Genitourinary:     Comments: L groin with a line in place.  Bhardwaj in place  Musculoskeletal:      Comments: LUE weakness noted.   Skin:     General: Skin is warm and dry.      Coloration: Skin is not jaundiced.   Neurological:      Mental Status: She is alert.      Cranial Nerves: No cranial nerve deficit.      Sensory: No sensory deficit.      Motor: Weakness present.      Comments: Sedated         Significant Labs:  CBC:   Recent Labs   Lab 09/28/20 0328   WBC 15.95*   RBC 3.25*   HGB 9.3*   HCT 29.6*      MCV 91   MCH 28.6   MCHC 31.4*     CMP:   Recent Labs   Lab 09/28/20 0328      CALCIUM 8.3*   ALBUMIN 2.2*   PROT 5.4*      K 3.5   CO2 22*      BUN 44*   CREATININE 0.7   ALKPHOS 204*   ALT 18   AST 37   BILITOT 3.6*       Significant Diagnostics:  I have reviewed all pertinent imaging results/findings within the past 24 hours.    Assessment/Plan:     * Perforated abdominal viscus  50 y.o. female w/ free air on CT scan. S/p emergent ex lap on 9/4 w/ findings of gastric perforation, primary repair. Now w/  abdominal closure on 9/6, skin stapled closed, KERRY drains removed. Class A to OR on 9/18 for free air and extrav or oral contrast seen on CT. CT on 9/25 redemonstrated contrast leaking from stomach, but well-drained with surgical drain.    Continue SICU care, appreciate their assistance  No operative intervention for gastric leaking at this time. Leak controlled with elian drain.  Tissue is so friable that repeat operative intervention around the G tube will not provide a long-term solution.   G tube and NG to gravity/suction  Wean vent settings as tolerated  Wound vac changes w/ wound care  Recommend ongoing goals of care discussion with the patient's  - DNR. Not yet comfort care.        Constance Martin MD  General Surgery  [facility]

## 2020-09-28 NOTE — PLAN OF CARE
Recommendations     1.) Change TPN to 150g of dextrose, 70gm of AA. TPN provides 1220kcal with propofol.     Goals: 1. Pt's intake to meet % EEN and EPN by RD follow up.  Nutrition Goal Status: progressing towards goal  Communication of RD Recs: (POC)

## 2020-09-28 NOTE — PROGRESS NOTES
Therapy with vancomycin complete and consult discontinued by provider. Pharmacy will sign off, please re-consult as needed.    Laenne Tomas, PharmD  PGY-2 Critical Care Pharmacy Resident  [id number]

## 2020-09-28 NOTE — PT/OT/SLP DISCHARGE
Physical Therapy Discharge Summary    Name: Elda Hernandez  MRN: [id number]   Principal Problem: Perforated abdominal viscus     Patient Discharged from acute Physical Therapy on 20. Pt intubated and sedated. Pt is partial DNR and goals of care discussion. .  Please refer to prior PT noted date on 20 for functional status.     Assessment:     Patient has not met goals.    Objective:     GOALS:   Multidisciplinary Problems     Physical Therapy Goals        Problem: Physical Therapy Goal    Goal Priority Disciplines Outcome Goal Variances Interventions   Physical Therapy Goal     PT, PT/OT Ongoing, Progressing     Description: Goals to be met by: 10/7/2020    Patient will increase functional independence with mobility by performin. Supine to sit with MInimal Assistance - not met   2. Sit to supine with MInimal Assistance - not met   3. Sit to stand transfer with Minimal Assistance  4. Bed to chair transfer with Moderate Assistance using Rolling Walker or no AD.    5. Lower extremity exercise program x30 reps per handout, with independence                     Reasons for Discontinuation of Therapy Services  Therapist determines that the patient will no longer benefit from therapy services.      Plan:     Patient Discharged to: pt intubated/sedated with partial DNR and goals of care discussion. No skilled PT is needed. Therapist of record not available to write discharge summary. .    Kandi [place], PT  2020

## 2020-09-28 NOTE — PLAN OF CARE
Plan of care reviewed in detail with patient. She does remain a partial DNR (just drug administration). No acute events overnight. No vent changes; ACVC 80/8. No titration of pressors, still @.06. Per MD, if we feel the need to add a second pressor, contact them to discuss the further plan of care. They will then contact family. UOP >60/hr. G-tube 60cc/q4. KERRY: No significant drainage. Wound vac did not drain any secretions. Significant oral secretions required frequent suctioning. Patient follows commands on propofol.     Skin: No pressure-related injuries. All HAPIs in Epic. Mattress inflated and foam pads in place.

## 2020-09-28 NOTE — PLAN OF CARE
JESSICA completed discharge planning reassessment. Patient is not medically ready for discharge. Per team, goals of care discussion. Now DNR. Continue medical management. Plan for repeat GoC if pressor requirement increases. Patient is intubated/sedated and on pressors.        09/28/20 0916   Discharge Reassessment   Assessment Type Discharge Planning Reassessment   Discharge plan remains the same: No   Discharge Plan A Long-term acute care facility (LTAC)   Discharge Plan B Skilled Nursing Facility   Patient choice form signed by patient/caregiver N/A   Anticipated Discharge Disposition Long Term   Can the patient/caregiver answer the patient profile reliably? No, cognitively impaired   Post-Acute Status   Post-Acute Authorization Placement   Post-Acute Placement Status Awaiting Internal Medical Clearance   Discharge Delays (!) Change in Medical Condition     Tigist Moody LMSW   - Case Management

## 2020-09-28 NOTE — PROGRESS NOTES
[facility]  Critical Care - Surgery  Progress Note    Patient Name: Elda Hernandez  MRN: [id number]  Admission Date: 9/3/2020  Hospital Length of Stay: 25 days  Code Status: Partial Code  Attending Provider: Clark Rodriguez MD  Primary Care Provider: Jordana Woods MD   Principal Problem: Perforated abdominal viscus    Subjective:     Hospital/ICU Course:  9/4: Pt admitted to SICU following ex lap for GI perf. Intubated, sedated. Wound vac in place.  Soon after arrival to unit a mottled appearance to RUE was noted.  Vascular consulted and US revealed R radial artery thrombosis.    9/5: Tachycardic..  R arm appearance greatly improved overnight.    9/6: Patient returned to OR for abdominal washout, closure. R arm with dopplerable signals to ulnar artery and palmar arch.   9/7: Attempted to wean patient off of sedation in an effort to move towards extubation. Patient weaned off of propofol. Precedex started. Patient's heart rate very labile and sensitize to sedation.   9/8: patient extubated  9/9: SHANICE. Labetalol PRN for HTN   9/10:  Diuresis increased.  Cr downtrending  9/11: Increased abdominal distension and elevated WBC count. CT abd  9/12: WBC to 45 today. Diflucan and zosyn initiated. Lactulose enema w/ improving mental status  9/13: WBC improved. Mental status improving. FWF initiated for hypernatremia.   9/14: White count continues to improve.  Oriented to person only this morning.    9/17: tolerating tube feeds, half TPN rate today. Possible step down   9/18: possible fall out of bed overnight. High G tube residual, now to gravity. Requiring comfortflo  9/19: s/p exlap. Intubated ans sedated. Weaning to extubate  9/20: No acute events overnight. Pt remains intubated, sedated. Plan to wean sedation for extubation.  9/21: Abdominal cultures grew Klebsiella; pt on zosyn  9/22: Pt did well with SBT yesterday; back on rate overnight.  Will try again today with hopes to extubate  9/23:  Extubated yesterday; doing well with nasal canula  9/24: remains on 3L NC. Diuresed yesterday w/ 240mg Lasix. Continue diuresis today with 40 Lasix BID  9/25: Blown L pupil / LUE weakness upon exam. CT head performed. CT abd/pelvis ordered.  9/26: Remains intubated. Pressure soft and H/H noted to be 6. Fluid resuscitation initiated. A-line not reading or drawing back.  9/27: Intubated. Ventilator settings weaned minimally. Hypotensive throughout the day. Continued fluid resuscitation.   9/28 - goals of care discussion. Now DNR. Continue medical management. Plan for repeat GoC if pressor requirement increases.     Interval History/Significant Events: no acute events overnight. Family meeting and goals of care discussions on going. Now DNR     Follow-up For: Procedure(s) (LRB):  LAPAROTOMY, EXPLORATORY, DRAINAGE OF ABSCESS, REPAIR OF GASTRIC PERFORATION, GASTROSTOMY TUBE PLACEMENT (N/A)  APPLICATION, WOUND VAC (N/A)    Post-Operative Day: 10 Days Post-Op    Objective:     Vital Signs (Most Recent):  Temp: 99 °F (37.2 °C) (09/28/20 0300)  Pulse: 79 (09/28/20 0615)  Resp: 20 (09/28/20 0615)  BP: (!) 104/53 (09/28/20 0615)  SpO2: 100 % (09/28/20 0615) Vital Signs (24h Range):  Temp:  [97.8 °F (36.6 °C)-99 °F (37.2 °C)] 99 °F (37.2 °C)  Pulse:  [64-93] 79  Resp:  [13-38] 20  SpO2:  [91 %-100 %] 100 %  BP: ()/(44-87) 104/53     Weight: 67.6 kg (149 lb)  Body mass index is 30.09 kg/m².      Intake/Output Summary (Last 24 hours) at 9/28/2020 0726  Last data filed at 9/28/2020 0600  Gross per 24 hour   Intake 8850.55 ml   Output 1562 ml   Net 7288.55 ml       Physical Exam  Vitals signs and nursing note reviewed.   Constitutional:       Appearance: She is ill-appearing.   HENT:      Head: Normocephalic and atraumatic.      Nose:      Comments: NG tube in place     Mouth/Throat:      Mouth: Mucous membranes are dry.      Comments: Endotracheal tube in place  Eyes:      General: No scleral icterus.  Neck:      Comments:  RIJ central line  Cardiovascular:      Rate and Rhythm: Normal rate and regular rhythm.      Heart sounds: Normal heart sounds.   Pulmonary:      Breath sounds: Rhonchi: bilateral.      Comments: Mechanically ventilated.  Abdominal:      General: There is distension.      Palpations: Abdomen is soft.      Comments: Midline incision with wound vac in place; excellent seal to wound vac.   L abd drains in place with mild serous output; G tube to gravity.   Genitourinary:     Comments: L groin with a line in place.  Bhardwaj in place  Musculoskeletal:      Comments: LUE weakness noted.   Skin:     General: Skin is warm and dry.      Coloration: Skin is not jaundiced.   Neurological:      Mental Status: She is alert.      Cranial Nerves: No cranial nerve deficit.      Sensory: No sensory deficit.      Motor: Weakness present.      Comments: Sedated         Vents:  Vent Mode: A/C (09/28/20 0313)  Ventilator Initiated: Yes (09/25/20 1221)  Set Rate: 18 BPM (09/28/20 0313)  Vt Set: 400 mL (09/28/20 0313)  Pressure Support: 5 cmH20 (09/22/20 0443)  PEEP/CPAP: 8 cmH20 (09/28/20 0313)  Oxygen Concentration (%): 80 (09/28/20 0600)  Peak Airway Pressure: 32 cmH2O (09/28/20 0313)  Plateau Pressure: 16 cmH20 (09/27/20 1530)  Total Ve: 9.29 mL (09/28/20 0313)  Negative Inspiratory Force (cm H2O): -31 (09/22/20 1021)  F/VT Ratio<105 (RSBI): (!) 48.66 (09/28/20 0313)    Lines/Drains/Airways     Central Venous Catheter Line            Percutaneous Central Line Insertion/Assessment - Triple Lumen  09/04/20 0800 right internal jugular 23 days          Drain                 Urethral Catheter 09/14/20 1330 13 days         Closed/Suction Drain 09/18/20 1555 Left Abdomen Bulb 19 Fr. 9 days         Closed/Suction Drain 09/18/20 1559 Left Abdomen Bulb 19 Fr. 9 days         Gastrostomy/Enterostomy 09/18/20 1612 Other (see comments) LUQ decompression;feeding 9 days         NG/OG Tube 09/18/20 1500 Right nostril 9 days          Airway                  Airway - Non-Surgical 09/25/20 1217 Endotracheal Tube 2 days          Peripheral Intravenous Line                 Peripheral IV - Single Lumen 09/20/20 1017 22 G Posterior;Right Hand 7 days                Significant Labs:    CBC/Anemia Profile:  Recent Labs   Lab 09/26/20  1746 09/27/20  0400 09/28/20  0328   WBC 11.46 15.70* 15.95*   HGB 8.7* 10.1* 9.3*   HCT 27.3* 32.3* 29.6*    223 258   MCV 89 90 91   RDW 15.9* 16.5* 16.7*        Chemistries:  Recent Labs   Lab 09/27/20  0400 09/28/20  0328    137   K 3.9 3.5    105   CO2 23 22*   BUN 46* 44*   CREATININE 0.8 0.7   CALCIUM 7.9* 8.3*   ALBUMIN 0.8* 2.2*   PROT 4.9* 5.4*   BILITOT 1.8* 3.6*   ALKPHOS 174* 204*   ALT 21 18   AST 39 37   MG 2.0 2.0   PHOS 3.2 2.9       All pertinent labs within the past 24 hours have been reviewed.    Significant Imaging:  I have reviewed and interpreted all pertinent imaging results/findings within the past 24 hours.    Assessment/Plan:     * Perforated abdominal viscus  Neuro:  - sedation: propofol @ 25  - analgesia: PRN oxy. Dilauded PRN  - Tylenol  - haldol q6hr PRN for agitation  - Stroke symptoms 9/25. CT head obtained. Vascular neurology consulted.     CV:   - Hypotensive requiring fluid resuscitation and low-dose pressors.  - Currently on .02 of Levo  - Fluid resuscitation w/ 2L LR bolus's, LR infusion @125/hr, and 500mL 5% albumin.  - femoral a-line placed 9/26 was removed due to dysfunction.   - Strict monitoring of BP w/ cuff    Pulm:   - Extubated 9/22  - Oxygen requirements increased acutely 9/25.Satting in the mid-90's on 10L NC O2  - Intubated 9/25 due to continued respiratory decline.   - Bronchoscopy w/ lavage performed  - Ventilator settings weaned to 80 O2 and 8 Peep.  - Chest XR this morning shows worsening pulmonary edema and bilateral pleural effusions. Possible need for drainage.  - Duonebs Q6 PRN     FEN/GI: Perforated stomach; s/p washout and patch (9/4); abdominal closure (9/6)  - s/p  ex-lap on 9/19   - continue to hold tube feeds at this time  - NG tube, G tube, and L abdominal drains in place. Purulent output from bulb drain noted.  - G tube upsized  - Lactulose discontinued (9/14) in setting of normal ammonia level (9/10) and improving mental status  - Daily metabolic panel with PRN repletion of electrolytes  - pantoprazole for ulcer ppx  - Cont TPN  - LR @ 125/hr  - 2L LR bolus. 500mL 5% Albumin  - Concentrated albumin scheduled Q8     Renal:  - Continue to monitor with daily metabolic labs  - nephrology on board, appreciate recs  - Bun/Cr 46/0.8 today; continues to improve  - UOP appx 865 yesterday. NET +6L yesterday   - Bhardwaj in place; monitoring I/Os     HEME/ID:   - Afebrile  - H/H stabilized. Current 10.1/32.3  - Daily CBC  - s/p Liver transplant in 2002; Daily tacrolimus level with AM labs. Per hepatology, continue tacrolimus 1mg BID  - acetaminophen PRN  - heparin for DVT ppx  - Abdominal culture grew Klebsiella pneumonia; sensitive to zosyn  - ABX regimen adjusted to Vanc, meropenum, and micafungin.    Endo:  - Synthroid 40mcg daily  - insulin aspart for blood glucose control    Dispo:  - Continue SICU care.         Carl Pino MD  Critical Care - Surgery  [facility]

## 2020-09-28 NOTE — SUBJECTIVE & OBJECTIVE
Interval History: No acute events overnight.  Remains on low dose levophed. DNR; still ok for medical management.     Medications:  Continuous Infusions:   lactated ringers 65 mL/hr at 09/28/20 0900    norepinephrine bitartrate-D5W 0.05 mcg/kg/min (09/28/20 0900)    propofoL 40 mcg/kg/min (09/28/20 0900)    TPN ADULT CENTRAL LINE CUSTOM 60 mL/hr at 09/28/20 0900    TPN ADULT CENTRAL LINE CUSTOM       Scheduled Meds:   albumin human 25%  25 g Intravenous Q8H    heparin (porcine)  5,000 Units Subcutaneous Q8H    levothyroxine  40 mcg Intravenous Daily    meropenem (MERREM) IVPB  2 g Intravenous Q8H    micafungin (MYCAMINE) IVPB  100 mg Intravenous Q24H    pantoprazole  40 mg Intravenous Daily    tacrolimus  0.5 mg Sublingual BID     PRN Meds:acetaminophen, albuterol-ipratropium, calcium gluconate IVPB, calcium gluconate IVPB, calcium gluconate IVPB, dextrose 50%, dextrose 50%, glucagon (human recombinant), glucose, glucose, haloperidol lactate, HYDROmorphone, insulin aspart U-100, iohexol, labetaloL, magnesium sulfate IVPB, magnesium sulfate IVPB, ondansetron, potassium chloride in water **AND** potassium chloride in water **AND** potassium chloride in water, promethazine, sodium chloride 0.9%, sodium phosphate IVPB, sodium phosphate IVPB, sodium phosphate IVPB, Pharmacy to dose Vancomycin consult **AND** vancomycin - pharmacy to dose     Review of patient's allergies indicates:   Allergen Reactions    Codeine Itching     Other reaction(s): Itching    Lipitor [atorvastatin] Other (See Comments)     Other reaction(s): Muscle pain  Muscle cranmps    Morphine Itching     Other reaction(s): nausea and vomiting     Zoloft [sertraline] Other (See Comments)     Tremors/muscle spasms     Objective:     Vital Signs (Most Recent):  Temp: 98.6 °F (37 °C) (09/28/20 0800)  Pulse: 83 (09/28/20 1111)  Resp: 20 (09/28/20 1111)  BP: (!) 96/49 (09/28/20 1111)  SpO2: 99 % (09/28/20 1111) Vital Signs (24h Range):  Temp:   [97.8 °F (36.6 °C)-99 °F (37.2 °C)] 98.6 °F (37 °C)  Pulse:  [73-93] 83  Resp:  [13-38] 20  SpO2:  [91 %-100 %] 99 %  BP: ()/(36-87) 96/49     Weight: 67.6 kg (149 lb)  Body mass index is 30.09 kg/m².    Intake/Output - Last 3 Shifts       09/26 0700 - 09/27 0659 09/27 0700 - 09/28 0659 09/28 0700 - 09/29 0659    I.V. (mL/kg) 3424 (50.7) 6538.2 (96.7)     Blood 1200 100     NG/GT 0      IV Piggyback 2100      TPN 1337 2212.4     Total Intake(mL/kg) 8061 (119.2) 8850.6 (130.9)     Urine (mL/kg/hr) 1135 (0.7) 1065 (0.7) 320 (1)    Drains 444 581 105    Other 0 50 0    Total Output 1579 1696 425    Net +6482 +7154.6 -425                 Physical Exam  Vitals signs and nursing note reviewed.   Constitutional:       Appearance: She is ill-appearing.   HENT:      Head: Normocephalic and atraumatic.      Nose:      Comments: NG tube in place     Mouth/Throat:      Mouth: Mucous membranes are dry.      Comments: Endotracheal tube in place  Eyes:      General: No scleral icterus.  Neck:      Comments: RIJ central line  Cardiovascular:      Rate and Rhythm: Normal rate and regular rhythm.      Heart sounds: Normal heart sounds.   Pulmonary:      Breath sounds: Rhonchi: bilateral.      Comments: Mechanically ventilated.  Abdominal:      General: There is distension.      Palpations: Abdomen is soft.      Comments: Midline incision with wound vac in place; excellent seal to wound vac.   L abd drains in place with mild serous output; G tube to gravity.   Genitourinary:     Comments: L groin with a line in place.  Bhardwaj in place  Musculoskeletal:      Comments: LUE weakness noted.   Skin:     General: Skin is warm and dry.      Coloration: Skin is not jaundiced.   Neurological:      Mental Status: She is alert.      Cranial Nerves: No cranial nerve deficit.      Sensory: No sensory deficit.      Motor: Weakness present.      Comments: Sedated         Significant Labs:  CBC:   Recent Labs   Lab 09/28/20  0328   WBC 15.95*    RBC 3.25*   HGB 9.3*   HCT 29.6*      MCV 91   MCH 28.6   MCHC 31.4*     CMP:   Recent Labs   Lab 09/28/20  0328      CALCIUM 8.3*   ALBUMIN 2.2*   PROT 5.4*      K 3.5   CO2 22*      BUN 44*   CREATININE 0.7   ALKPHOS 204*   ALT 18   AST 37   BILITOT 3.6*       Significant Diagnostics:  I have reviewed all pertinent imaging results/findings within the past 24 hours.

## 2020-09-28 NOTE — PT/OT/SLP DISCHARGE
Occupational Therapy Discharge Summary    Elda Hernandez  MRN: [id number]   Principal Problem: Perforated abdominal viscus      Patient Discharged from acute Occupational Therapy on 9/28/20.      Assessment:      Patient is no longer making progress.    Objective:     GOALS:   Multidisciplinary Problems     Occupational Therapy Goals        Problem: Occupational Therapy Goal    Goal Priority Disciplines Outcome Interventions   Occupational Therapy Goal     OT, PT/OT Ongoing, Progressing    Description: Goals to be met by: 10/7/20     Patient will increase functional independence with ADLs by performing:    UE Dressing with min A  LE Dressing with min A  Grooming while sitting EOB with min A  Toileting from [facility]  with min A for hygiene and clothing management.   Toilet transfer to [facility] with min A  Pt will tolerate sitting EOB with VSS with CGA while engaging in functional reaching tasks                      Reasons for Discontinuation of Therapy Services  Patient is unable to continue work toward goals because of medical or psychosocial complications.      Plan:     Patient Discharged to: pt remains in ICU intubated/sedated and with GOC discussion planned    PAULINE Geronimo  9/28/2020

## 2020-09-28 NOTE — TREATMENT PLAN
Hepatology Treatment Plan    Elda Hernandez is a 50 y.o. female admitted to hospital 9/3/2020 (Hospital Day: 26) due to Perforated abdominal viscus. Hepatology following for immunosuppression management.    Lab Results   Component Value Date    TACROLIMUS 2.0 (L) 09/28/2020    TACROLIMUS 1.7 (L) 09/27/2020    TACROLIMUS <1.5 (L) 09/26/2020       Lab Results   Component Value Date    CREATININE 0.7 09/28/2020    CREATININE 0.8 09/27/2020    CREATININE 1.0 09/26/2020       Lab Results   Component Value Date    ALT 18 09/28/2020    AST 37 09/28/2020     (H) 04/07/2018    ALKPHOS 204 (H) 09/28/2020    BILITOT 3.6 (H) 09/28/2020    WBC 15.95 (H) 09/28/2020    HGB 9.3 (L) 09/28/2020     09/28/2020       Kidney function is stable  Liver enzymes are stable    Plan  - Continue immunosuppression regimen without changes    Immunosuppression Regimen:  Tacrolimus: 0.5mg qAM, 0.5mg qPM sublingual    Please notify hepatology on day of discharge to discuss discharge medications and follow up.     Please obtain daily CBC, BMP, LFT, INR, immunosuppressant trough level    Thank you for involving us in the care of Elda Hernandez. Please call with any additional concerns or questions.    Fatuma Mathews MD  Gastroenterology Fellow

## 2020-09-29 LAB
ALBUMIN SERPL BCP-MCNC: 2.7 G/DL (ref 3.5–5.2)
ALP SERPL-CCNC: 225 U/L (ref 55–135)
ALT SERPL W/O P-5'-P-CCNC: 13 U/L (ref 10–44)
ANION GAP SERPL CALC-SCNC: 9 MMOL/L (ref 8–16)
AST SERPL-CCNC: 28 U/L (ref 10–40)
BASOPHILS # BLD AUTO: 0.04 K/UL (ref 0–0.2)
BASOPHILS NFR BLD: 0.3 % (ref 0–1.9)
BILIRUB SERPL-MCNC: 4.2 MG/DL (ref 0.1–1)
BUN SERPL-MCNC: 34 MG/DL (ref 6–20)
CALCIUM SERPL-MCNC: 8.7 MG/DL (ref 8.7–10.5)
CHLORIDE SERPL-SCNC: 106 MMOL/L (ref 95–110)
CO2 SERPL-SCNC: 22 MMOL/L (ref 23–29)
CREAT SERPL-MCNC: 0.7 MG/DL (ref 0.5–1.4)
DIFFERENTIAL METHOD: ABNORMAL
EOSINOPHIL # BLD AUTO: 0.4 K/UL (ref 0–0.5)
EOSINOPHIL NFR BLD: 3.3 % (ref 0–8)
ERYTHROCYTE [DISTWIDTH] IN BLOOD BY AUTOMATED COUNT: 17.5 % (ref 11.5–14.5)
EST. GFR  (AFRICAN AMERICAN): >60 ML/MIN/1.73 M^2
EST. GFR  (NON AFRICAN AMERICAN): >60 ML/MIN/1.73 M^2
GLUCOSE SERPL-MCNC: 104 MG/DL (ref 70–110)
HCT VFR BLD AUTO: 27.2 % (ref 37–48.5)
HGB BLD-MCNC: 8.5 G/DL (ref 12–16)
IMM GRANULOCYTES # BLD AUTO: 0.42 K/UL (ref 0–0.04)
IMM GRANULOCYTES NFR BLD AUTO: 3.5 % (ref 0–0.5)
LYMPHOCYTES # BLD AUTO: 1.4 K/UL (ref 1–4.8)
LYMPHOCYTES NFR BLD: 11.8 % (ref 18–48)
MAGNESIUM SERPL-MCNC: 2 MG/DL (ref 1.6–2.6)
MCH RBC QN AUTO: 28.8 PG (ref 27–31)
MCHC RBC AUTO-ENTMCNC: 31.3 G/DL (ref 32–36)
MCV RBC AUTO: 92 FL (ref 82–98)
MONOCYTES # BLD AUTO: 1.3 K/UL (ref 0.3–1)
MONOCYTES NFR BLD: 10.9 % (ref 4–15)
NEUTROPHILS # BLD AUTO: 8.4 K/UL (ref 1.8–7.7)
NEUTROPHILS NFR BLD: 70.2 % (ref 38–73)
NRBC BLD-RTO: 0 /100 WBC
PHOSPHATE SERPL-MCNC: 2.7 MG/DL (ref 2.7–4.5)
PLATELET # BLD AUTO: 202 K/UL (ref 150–350)
PMV BLD AUTO: 12.3 FL (ref 9.2–12.9)
POCT GLUCOSE: 113 MG/DL (ref 70–110)
POCT GLUCOSE: 113 MG/DL (ref 70–110)
POCT GLUCOSE: 114 MG/DL (ref 70–110)
POCT GLUCOSE: 60 MG/DL (ref 70–110)
POCT GLUCOSE: 84 MG/DL (ref 70–110)
POCT GLUCOSE: 88 MG/DL (ref 70–110)
POCT GLUCOSE: 91 MG/DL (ref 70–110)
POCT GLUCOSE: 95 MG/DL (ref 70–110)
POTASSIUM SERPL-SCNC: 3.7 MMOL/L (ref 3.5–5.1)
PROT SERPL-MCNC: 5.5 G/DL (ref 6–8.4)
RBC # BLD AUTO: 2.95 M/UL (ref 4–5.4)
SODIUM SERPL-SCNC: 137 MMOL/L (ref 136–145)
TACROLIMUS BLD-MCNC: <1.5 NG/ML (ref 5–15)
TRIGL SERPL-MCNC: 116 MG/DL (ref 30–150)
WBC # BLD AUTO: 11.98 K/UL (ref 3.9–12.7)

## 2020-09-29 PROCEDURE — 25000003 PHARM REV CODE 250: Performed by: STUDENT IN AN ORGANIZED HEALTH CARE EDUCATION/TRAINING PROGRAM

## 2020-09-29 PROCEDURE — 63600175 PHARM REV CODE 636 W HCPCS: Performed by: STUDENT IN AN ORGANIZED HEALTH CARE EDUCATION/TRAINING PROGRAM

## 2020-09-29 PROCEDURE — 83735 ASSAY OF MAGNESIUM: CPT

## 2020-09-29 PROCEDURE — A4217 STERILE WATER/SALINE, 500 ML: HCPCS | Performed by: STUDENT IN AN ORGANIZED HEALTH CARE EDUCATION/TRAINING PROGRAM

## 2020-09-29 PROCEDURE — 94761 N-INVAS EAR/PLS OXIMETRY MLT: CPT

## 2020-09-29 PROCEDURE — 94003 VENT MGMT INPAT SUBQ DAY: CPT

## 2020-09-29 PROCEDURE — 99900035 HC TECH TIME PER 15 MIN (STAT)

## 2020-09-29 PROCEDURE — 27000221 HC OXYGEN, UP TO 24 HOURS

## 2020-09-29 PROCEDURE — P9047 ALBUMIN (HUMAN), 25%, 50ML: HCPCS | Mod: JG | Performed by: STUDENT IN AN ORGANIZED HEALTH CARE EDUCATION/TRAINING PROGRAM

## 2020-09-29 PROCEDURE — 99900026 HC AIRWAY MAINTENANCE (STAT)

## 2020-09-29 PROCEDURE — 80053 COMPREHEN METABOLIC PANEL: CPT

## 2020-09-29 PROCEDURE — 25000003 PHARM REV CODE 250: Performed by: INTERNAL MEDICINE

## 2020-09-29 PROCEDURE — 99291 PR CRITICAL CARE, E/M 30-74 MINUTES: ICD-10-PCS | Mod: 24,GC,, | Performed by: STUDENT IN AN ORGANIZED HEALTH CARE EDUCATION/TRAINING PROGRAM

## 2020-09-29 PROCEDURE — 63600175 PHARM REV CODE 636 W HCPCS: Mod: JG | Performed by: STUDENT IN AN ORGANIZED HEALTH CARE EDUCATION/TRAINING PROGRAM

## 2020-09-29 PROCEDURE — 63600175 PHARM REV CODE 636 W HCPCS: Performed by: INTERNAL MEDICINE

## 2020-09-29 PROCEDURE — 99900017 HC EXTUBATION W/PARAMETERS (STAT)

## 2020-09-29 PROCEDURE — 80197 ASSAY OF TACROLIMUS: CPT

## 2020-09-29 PROCEDURE — C9113 INJ PANTOPRAZOLE SODIUM, VIA: HCPCS | Performed by: STUDENT IN AN ORGANIZED HEALTH CARE EDUCATION/TRAINING PROGRAM

## 2020-09-29 PROCEDURE — 85025 COMPLETE CBC W/AUTO DIFF WBC: CPT

## 2020-09-29 PROCEDURE — 94150 VITAL CAPACITY TEST: CPT

## 2020-09-29 PROCEDURE — 94010 BREATHING CAPACITY TEST: CPT

## 2020-09-29 PROCEDURE — 63600175 PHARM REV CODE 636 W HCPCS: Performed by: SURGERY

## 2020-09-29 PROCEDURE — 94668 MNPJ CHEST WALL SBSQ: CPT

## 2020-09-29 PROCEDURE — 20000000 HC ICU ROOM

## 2020-09-29 PROCEDURE — 99291 CRITICAL CARE FIRST HOUR: CPT | Mod: 24,GC,, | Performed by: STUDENT IN AN ORGANIZED HEALTH CARE EDUCATION/TRAINING PROGRAM

## 2020-09-29 PROCEDURE — 84478 ASSAY OF TRIGLYCERIDES: CPT

## 2020-09-29 PROCEDURE — 84100 ASSAY OF PHOSPHORUS: CPT

## 2020-09-29 RX ORDER — TACROLIMUS 1 MG/1
1 CAPSULE ORAL EVERY MORNING
Status: DISCONTINUED | OUTPATIENT
Start: 2020-09-30 | End: 2020-10-01

## 2020-09-29 RX ORDER — DEXMEDETOMIDINE HYDROCHLORIDE 4 UG/ML
0.2 INJECTION, SOLUTION INTRAVENOUS CONTINUOUS
Status: DISCONTINUED | OUTPATIENT
Start: 2020-09-29 | End: 2020-09-30

## 2020-09-29 RX ORDER — TACROLIMUS 0.5 MG/1
0.5 CAPSULE ORAL EVERY EVENING
Status: DISCONTINUED | OUTPATIENT
Start: 2020-09-29 | End: 2020-10-01

## 2020-09-29 RX ORDER — TACROLIMUS 0.5 MG/1
0.5 CAPSULE ORAL ONCE
Status: COMPLETED | OUTPATIENT
Start: 2020-09-29 | End: 2020-09-29

## 2020-09-29 RX ORDER — TACROLIMUS 0.5 MG/1
0.5 CAPSULE ORAL 2 TIMES DAILY
Status: DISCONTINUED | OUTPATIENT
Start: 2020-09-29 | End: 2020-09-29

## 2020-09-29 RX ORDER — SODIUM CHLORIDE, SODIUM LACTATE, POTASSIUM CHLORIDE, CALCIUM CHLORIDE 600; 310; 30; 20 MG/100ML; MG/100ML; MG/100ML; MG/100ML
INJECTION, SOLUTION INTRAVENOUS CONTINUOUS
Status: DISCONTINUED | OUTPATIENT
Start: 2020-09-29 | End: 2020-09-30

## 2020-09-29 RX ADMIN — PANTOPRAZOLE SODIUM 40 MG: 40 INJECTION, POWDER, LYOPHILIZED, FOR SOLUTION INTRAVENOUS at 09:09

## 2020-09-29 RX ADMIN — TACROLIMUS 0.5 MG: 0.5 CAPSULE ORAL at 01:09

## 2020-09-29 RX ADMIN — MEROPENEM 2 G: 1 INJECTION, POWDER, FOR SOLUTION INTRAVENOUS at 05:09

## 2020-09-29 RX ADMIN — PROPOFOL 50 MCG/KG/MIN: 10 INJECTION, EMULSION INTRAVENOUS at 05:09

## 2020-09-29 RX ADMIN — DEXMEDETOMIDINE HYDROCHLORIDE 0.2 MCG/KG/HR: 4 INJECTION, SOLUTION INTRAVENOUS at 10:09

## 2020-09-29 RX ADMIN — MICAFUNGIN SODIUM 100 MG: 20 INJECTION, POWDER, LYOPHILIZED, FOR SOLUTION INTRAVENOUS at 08:09

## 2020-09-29 RX ADMIN — ALBUMIN (HUMAN) 25 G: 12.5 SOLUTION INTRAVENOUS at 05:09

## 2020-09-29 RX ADMIN — PROPOFOL 50 MCG/KG/MIN: 10 INJECTION, EMULSION INTRAVENOUS at 03:09

## 2020-09-29 RX ADMIN — HYDROMORPHONE HYDROCHLORIDE 0.5 MG: 1 INJECTION, SOLUTION INTRAMUSCULAR; INTRAVENOUS; SUBCUTANEOUS at 10:09

## 2020-09-29 RX ADMIN — LEVOTHYROXINE SODIUM ANHYDROUS 40 MCG: 100 INJECTION, POWDER, LYOPHILIZED, FOR SOLUTION INTRAVENOUS at 09:09

## 2020-09-29 RX ADMIN — HEPARIN SODIUM 5000 UNITS: 5000 INJECTION INTRAVENOUS; SUBCUTANEOUS at 09:09

## 2020-09-29 RX ADMIN — HEPARIN SODIUM 5000 UNITS: 5000 INJECTION INTRAVENOUS; SUBCUTANEOUS at 01:09

## 2020-09-29 RX ADMIN — MEROPENEM 2 G: 1 INJECTION, POWDER, FOR SOLUTION INTRAVENOUS at 09:09

## 2020-09-29 RX ADMIN — SODIUM CHLORIDE, SODIUM LACTATE, POTASSIUM CHLORIDE, AND CALCIUM CHLORIDE: .6; .31; .03; .02 INJECTION, SOLUTION INTRAVENOUS at 12:09

## 2020-09-29 RX ADMIN — Medication 0.04 MCG/KG/MIN: at 04:09

## 2020-09-29 RX ADMIN — MEROPENEM 2 G: 1 INJECTION, POWDER, FOR SOLUTION INTRAVENOUS at 01:09

## 2020-09-29 RX ADMIN — TACROLIMUS 0.5 MG: 0.5 CAPSULE ORAL at 09:09

## 2020-09-29 RX ADMIN — POTASSIUM CHLORIDE 40 MEQ: 29.8 INJECTION, SOLUTION INTRAVENOUS at 04:09

## 2020-09-29 RX ADMIN — MAGNESIUM SULFATE HEPTAHYDRATE: 500 INJECTION, SOLUTION INTRAMUSCULAR; INTRAVENOUS at 09:09

## 2020-09-29 RX ADMIN — TACROLIMUS 0.5 MG: 0.5 CAPSULE ORAL at 06:09

## 2020-09-29 RX ADMIN — HEPARIN SODIUM 5000 UNITS: 5000 INJECTION INTRAVENOUS; SUBCUTANEOUS at 05:09

## 2020-09-29 NOTE — SUBJECTIVE & OBJECTIVE
Interval History: No acute events overnight. Has ascitic drainage from prior abdominal drain site and left groin a line site. Remains on low dose levophed. DNR    Medications:  Continuous Infusions:   dexmedetomidine (PRECEDEX) infusion      lactated ringers 65 mL/hr at 09/29/20 1000    norepinephrine bitartrate-D5W 0.02 mcg/kg/min (09/29/20 1000)    propofoL 25 mcg/kg/min (09/29/20 1000)    TPN ADULT CENTRAL LINE CUSTOM 60 mL/hr at 09/29/20 1000    TPN ADULT CENTRAL LINE CUSTOM       Scheduled Meds:   heparin (porcine)  5,000 Units Subcutaneous Q8H    levothyroxine  40 mcg Intravenous Daily    meropenem (MERREM) IVPB  2 g Intravenous Q8H    micafungin (MYCAMINE) IVPB  100 mg Intravenous Q24H    pantoprazole  40 mg Intravenous Daily    tacrolimus  0.5 mg Sublingual BID     PRN Meds:acetaminophen, albuterol-ipratropium, calcium gluconate IVPB, calcium gluconate IVPB, calcium gluconate IVPB, dextrose 50%, dextrose 50%, glucagon (human recombinant), glucose, glucose, haloperidol lactate, HYDROmorphone, insulin aspart U-100, iohexol, magnesium sulfate IVPB, magnesium sulfate IVPB, ondansetron, potassium chloride in water **AND** potassium chloride in water **AND** potassium chloride in water, promethazine, sodium chloride 0.9%, sodium phosphate IVPB, sodium phosphate IVPB, sodium phosphate IVPB     Review of patient's allergies indicates:   Allergen Reactions    Codeine Itching     Other reaction(s): Itching    Lipitor [atorvastatin] Other (See Comments)     Other reaction(s): Muscle pain  Muscle cranmps    Morphine Itching     Other reaction(s): nausea and vomiting     Zoloft [sertraline] Other (See Comments)     Tremors/muscle spasms     Objective:     Vital Signs (Most Recent):  Temp: 99.2 °F (37.3 °C) (09/29/20 0745)  Pulse: 95 (09/29/20 1000)  Resp: (!) 22 (09/29/20 1000)  BP: (!) 118/59 (09/29/20 1000)  SpO2: 98 % (09/29/20 1000) Vital Signs (24h Range):  Temp:  [98.3 °F (36.8 °C)-99.4 °F (37.4  °C)] 99.2 °F (37.3 °C)  Pulse:  [] 95  Resp:  [18-60] 22  SpO2:  [93 %-100 %] 98 %  BP: ()/(45-77) 118/59     Weight: 67.8 kg (149 lb 7.6 oz)  Body mass index is 30.19 kg/m².    Intake/Output - Last 3 Shifts       09/27 0700 - 09/28 0659 09/28 0700 - 09/29 0659 09/29 0700 - 09/30 0659    I.V. (mL/kg) 6538.2 (96.7) 3073 (45.3)     Blood 100      NG/GT       IV Piggyback       TPN 2212.4 1384     Total Intake(mL/kg) 8850.6 (130.9) 4457 (65.7)     Urine (mL/kg/hr) 1065 (0.7) 2205 (1.4) 760 (3.4)    Drains 581 283 94    Other 50 0 100    Total Output 1696 2488 954    Net +7154.6 +1969 -954                 Physical Exam  Vitals signs and nursing note reviewed.   Constitutional:       Appearance: She is ill-appearing.   HENT:      Head: Normocephalic and atraumatic.      Nose:      Comments: NG tube in place     Mouth/Throat:      Mouth: Mucous membranes are dry.      Comments: Endotracheal tube in place  Eyes:      General: No scleral icterus.  Neck:      Comments: RIJ central line  Cardiovascular:      Rate and Rhythm: Normal rate and regular rhythm.      Heart sounds: Normal heart sounds.   Pulmonary:      Breath sounds: Rhonchi: bilateral.      Comments: Mechanically ventilated.  Abdominal:      General: There is distension.      Palpations: Abdomen is soft.      Comments: Midline incision with wound vac in place; excellent seal to wound vac.   L abd drains in place with mild serous output; G tube to gravity.   Genitourinary:     Comments: L groin a line removed, now with drainage from insertion site  Bhardwaj in place  Musculoskeletal:      Comments: LUE weakness noted.   Skin:     General: Skin is warm and dry.      Coloration: Skin is not jaundiced.   Neurological:      Mental Status: She is alert.      Cranial Nerves: No cranial nerve deficit.      Sensory: No sensory deficit.      Motor: Weakness present.      Comments: Sedated         Significant Labs:  CBC:   Recent Labs   Lab 09/29/20  0300   WBC  11.98   RBC 2.95*   HGB 8.5*   HCT 27.2*      MCV 92   MCH 28.8   MCHC 31.3*     CMP:   Recent Labs   Lab 09/29/20  0300      CALCIUM 8.7   ALBUMIN 2.7*   PROT 5.5*      K 3.7   CO2 22*      BUN 34*   CREATININE 0.7   ALKPHOS 225*   ALT 13   AST 28   BILITOT 4.2*       Significant Diagnostics:  I have reviewed all pertinent imaging results/findings within the past 24 hours.

## 2020-09-29 NOTE — TREATMENT PLAN
Hepatology Treatment Plan    Elda Hernandez is a 50 y.o. female admitted to hospital 9/3/2020 (Hospital Day: 27) due to Perforated abdominal viscus. Hepatology following for immunosuppression management.    Lab Results   Component Value Date    TACROLIMUS <1.5 (L) 09/29/2020    TACROLIMUS 2.0 (L) 09/28/2020    TACROLIMUS 1.7 (L) 09/27/2020       Lab Results   Component Value Date    CREATININE 0.7 09/29/2020    CREATININE 0.7 09/28/2020    CREATININE 0.8 09/27/2020       Lab Results   Component Value Date    ALT 13 09/29/2020    AST 28 09/29/2020     (H) 04/07/2018    ALKPHOS 225 (H) 09/29/2020    BILITOT 4.2 (H) 09/29/2020    WBC 11.98 09/29/2020    HGB 8.5 (L) 09/29/2020     09/29/2020       Kidney function is stable  Liver enzymes are worsening    Plan  - Changes to immunosuppression regimen as below    Immunosuppression Regimen:  Tacrolimus: 1mg qAM, 0.5mg qPM sublingual    Please notify hepatology on day of discharge to discuss discharge medications and follow up.     Please obtain daily CBC, BMP, LFT, INR, immunosuppressant trough level    Thank you for involving us in the care of Elda Hernandez. Please call with any additional concerns or questions.    Fatuma Mathews MD  Gastroenterology Fellow

## 2020-09-29 NOTE — PLAN OF CARE
"      SICU PLAN OF CARE NOTE    Dx: Perforated abdominal viscus    Shift Events: no acute events overnight    Goals of Care: consult palliative     Neuro: Sedated, Follows Commands, and Moves All Extremities    Vital Signs: BP (!) 113/55 (BP Location: Left arm, Patient Position: Lying)   Pulse 88   Temp 99.4 °F (37.4 °C) (Oral)   Resp 18   Ht 4' 11" (1.499 m)   Wt 67.8 kg (149 lb 7.6 oz)   SpO2 100%   BMI 30.19 kg/m²     Respiratory: Ventilator 50%/8 PEEP    Diet: TPN    Gtts: Propfol, Norepinephrine, and MIVF    Urine Output: Urinary Catheter 1508 cc/shift    Drains: KERRY Drain, total output 38 cc / shift  G tube - 140 cc/shift     Labs/Accuchecks: Q4H accuchecks    Skin: patient turned Q2H. Deb bed plugged in. Heel foams and sacral foam on. Boots on. All LDAs documented. No other skin breakdown noted. See flowsheet.        "

## 2020-09-29 NOTE — ASSESSMENT & PLAN NOTE
Neuro:  - sedation: propofol @ 50  - analgesia: PRN oxy. Dilauded PRN  - haldol q6hr PRN for agitation  - Stroke symptoms 9/25. CT head obtained. Vascular neurology consulted.     CV:   - Hypotensive requiring fluid resuscitation and low-dose pressors.  - Currently on .04 of Levo  - LR infusion @ 65/hr  - femoral a-line placed 9/26 was removed due to dysfunction   - Strict monitoring of BP w/ cuff    Pulm:   - Extubated 9/22  - Oxygen requirements increased acutely 9/25.Satting in the mid-90's on 10L NC O2  - Intubated 9/25 due to continued respiratory decline.   - Bronchoscopy w/ lavage performed  - Ventilator settings weaned to 50 O2 and 8 Peep.  - SBT today  - Chest XR shows worsening pulmonary edema and bilateral pleural effusions. Possible need for drainage.  - Duonebs Q6 PRN     FEN/GI: Perforated stomach; s/p washout and patch (9/4); abdominal closure (9/6)  - s/p ex-lap on 9/19   - continue to hold tube feeds at this time  - NG tube, G tube, and L abdominal drains in place. Purulent output from bulb drain noted.  - G tube upsized  - Lactulose discontinued (9/14) in setting of normal ammonia level (9/10) and improving mental status  - Daily metabolic panel with PRN repletion of electrolytes  - pantoprazole for ulcer ppx  - Cont TPN  - LR @ 65/hr     Renal:  - Continue to monitor with daily metabolic labs  - nephrology on board, appreciate recs  - Bun/Cr 34/0.7 today; continues to improve  - UOP appx 2065 yesterday. NET +2L yesterday   - Bhardwaj in place; monitoring I/Os     HEME/ID:   - Afebrile  - H/H stable. Current 8.5/27.2  - Daily CBC  - s/p Liver transplant in 2002; Daily tacrolimus level with AM labs. Per hepatology, continue tacrolimus 1mg BID  - heparin for DVT ppx  - Abdominal culture grew Klebsiella pneumonia; sensitive to zosyn  - ABX regimen adjusted to meropenum, and micafungin.    Endo:  - Synthroid 40mcg daily  - insulin aspart for blood glucose control    Dispo:  - Continue SICU care.

## 2020-09-29 NOTE — PROGRESS NOTES
[facility]  General Surgery  Progress Note    Subjective:     History of Present Illness:  Ms. Hernandez is a 49yo F w/PMH of von Gierke disease s/p liver transplant (2002, on tacro + MMF, follows Dr. Nielsen), hx chronic rejection (bx 2015 with acute and chronic rejection s/p steroids), biliary stricture and ductopenic rejection, recently with cirrhosis on fibroscan (10/2019), HTN, and depression who presents as a transfer for further evaluation of gastric outlet obstruction and esophageal stricturing.  Patient decompensated requiring multiple pressors and intubation. CT scan showed free air. Prior to intubation patient reported severe abdominal pain.   states that patient has had epigastric pain, nausea, and vomiting for several days.     Post-Op Info:  Procedure(s) (LRB):  LAPAROTOMY, EXPLORATORY, DRAINAGE OF ABSCESS, REPAIR OF GASTRIC PERFORATION, GASTROSTOMY TUBE PLACEMENT (N/A)  APPLICATION, WOUND VAC (N/A)   11 Days Post-Op     Interval History: No acute events overnight. Has ascitic drainage from prior abdominal drain site and left groin a line site. Remains on low dose levophed. DNR    Medications:  Continuous Infusions:   dexmedetomidine (PRECEDEX) infusion      lactated ringers 65 mL/hr at 09/29/20 1000    norepinephrine bitartrate-D5W 0.02 mcg/kg/min (09/29/20 1000)    propofoL 25 mcg/kg/min (09/29/20 1000)    TPN ADULT CENTRAL LINE CUSTOM 60 mL/hr at 09/29/20 1000    TPN ADULT CENTRAL LINE CUSTOM       Scheduled Meds:   heparin (porcine)  5,000 Units Subcutaneous Q8H    levothyroxine  40 mcg Intravenous Daily    meropenem (MERREM) IVPB  2 g Intravenous Q8H    micafungin (MYCAMINE) IVPB  100 mg Intravenous Q24H    pantoprazole  40 mg Intravenous Daily    tacrolimus  0.5 mg Sublingual BID     PRN Meds:acetaminophen, albuterol-ipratropium, calcium gluconate IVPB, calcium gluconate IVPB, calcium gluconate IVPB, dextrose 50%, dextrose 50%, glucagon (human recombinant),  glucose, glucose, haloperidol lactate, HYDROmorphone, insulin aspart U-100, iohexol, magnesium sulfate IVPB, magnesium sulfate IVPB, ondansetron, potassium chloride in water **AND** potassium chloride in water **AND** potassium chloride in water, promethazine, sodium chloride 0.9%, sodium phosphate IVPB, sodium phosphate IVPB, sodium phosphate IVPB     Review of patient's allergies indicates:   Allergen Reactions    Codeine Itching     Other reaction(s): Itching    Lipitor [atorvastatin] Other (See Comments)     Other reaction(s): Muscle pain  Muscle cranmps    Morphine Itching     Other reaction(s): nausea and vomiting     Zoloft [sertraline] Other (See Comments)     Tremors/muscle spasms     Objective:     Vital Signs (Most Recent):  Temp: 99.2 °F (37.3 °C) (09/29/20 0745)  Pulse: 95 (09/29/20 1000)  Resp: (!) 22 (09/29/20 1000)  BP: (!) 118/59 (09/29/20 1000)  SpO2: 98 % (09/29/20 1000) Vital Signs (24h Range):  Temp:  [98.3 °F (36.8 °C)-99.4 °F (37.4 °C)] 99.2 °F (37.3 °C)  Pulse:  [] 95  Resp:  [18-60] 22  SpO2:  [93 %-100 %] 98 %  BP: ()/(45-77) 118/59     Weight: 67.8 kg (149 lb 7.6 oz)  Body mass index is 30.19 kg/m².    Intake/Output - Last 3 Shifts       09/27 0700 - 09/28 0659 09/28 0700 - 09/29 0659 09/29 0700 - 09/30 0659    I.V. (mL/kg) 6538.2 (96.7) 3073 (45.3)     Blood 100      NG/GT       IV Piggyback       TPN 2212.4 1384     Total Intake(mL/kg) 8850.6 (130.9) 4457 (65.7)     Urine (mL/kg/hr) 1065 (0.7) 2205 (1.4) 760 (3.4)    Drains 581 283 94    Other 50 0 100    Total Output 1696 2488 954    Net +7154.6 +1969 -954                 Physical Exam  Vitals signs and nursing note reviewed.   Constitutional:       Appearance: She is ill-appearing.   HENT:      Head: Normocephalic and atraumatic.      Nose:      Comments: NG tube in place     Mouth/Throat:      Mouth: Mucous membranes are dry.      Comments: Endotracheal tube in place  Eyes:      General: No scleral icterus.  Neck:       Comments: RIJ central line  Cardiovascular:      Rate and Rhythm: Normal rate and regular rhythm.      Heart sounds: Normal heart sounds.   Pulmonary:      Breath sounds: Rhonchi: bilateral.      Comments: Mechanically ventilated.  Abdominal:      General: There is distension.      Palpations: Abdomen is soft.      Comments: Midline incision with wound vac in place; excellent seal to wound vac.   L abd drains in place with mild serous output; G tube to gravity.   Genitourinary:     Comments: L groin a line removed, now with drainage from insertion site  Bhardwaj in place  Musculoskeletal:      Comments: LUE weakness noted.   Skin:     General: Skin is warm and dry.      Coloration: Skin is not jaundiced.   Neurological:      Mental Status: She is alert.      Cranial Nerves: No cranial nerve deficit.      Sensory: No sensory deficit.      Motor: Weakness present.      Comments: Sedated         Significant Labs:  CBC:   Recent Labs   Lab 09/29/20  0300   WBC 11.98   RBC 2.95*   HGB 8.5*   HCT 27.2*      MCV 92   MCH 28.8   MCHC 31.3*     CMP:   Recent Labs   Lab 09/29/20  0300      CALCIUM 8.7   ALBUMIN 2.7*   PROT 5.5*      K 3.7   CO2 22*      BUN 34*   CREATININE 0.7   ALKPHOS 225*   ALT 13   AST 28   BILITOT 4.2*       Significant Diagnostics:  I have reviewed all pertinent imaging results/findings within the past 24 hours.    Assessment/Plan:     * Perforated abdominal viscus  50 y.o. female w/ free air on CT scan. S/p emergent ex lap on 9/4 w/ findings of gastric perforation, primary repair. Now w/  abdominal closure on 9/6, skin stapled closed, KERRY drains removed. Class A to OR on 9/18 for free air and extrav or oral contrast seen on CT. CT on 9/25 redemonstrated contrast leaking from stomach, but well-drained with surgical drain.    Continue SICU care, appreciate their assistance  No operative intervention for gastric leaking at this time. Leak controlled with elian drain. Tissue is so  friable that repeat operative intervention around the G tube will not provide a long-term solution.   G tube and NG to gravity/suction  Wean vent settings as tolerated  Wound vac changes w/ wound care  Recommend ongoing goals of care discussion with the patient's  - DNR. Not yet comfort care.        oRcio Hidalgo MD  General Surgery  [facility]

## 2020-09-29 NOTE — PLAN OF CARE
"      SICU PLAN OF CARE NOTE    Dx: Perforated abdominal viscus    Shift Events: levo off, extubated, DNR rescinded--full code.     Goals of Care: wean O2    Neuro: Arouses to Voice, Follows Commands, Moves All Extremities, and Confused    Cardiac: SR 90s. MAP > 65. CVP 11, 11, 14.     Vital Signs: BP (!) 124/57 (BP Location: Left arm, Patient Position: Lying)   Pulse 97   Temp 98.5 °F (36.9 °C) (Oral)   Resp 16   Ht 4' 11" (1.499 m)   Wt 67.8 kg (149 lb 7.6 oz)   SpO2 95%   BMI 30.19 kg/m²     Respiratory: Nasal Cannula 5L NC, sating > 94%.     Diet: NPO and TPN    Gtts: TPN, LR    Urine Output: Urinary Catheter  cc/hour    Drains: KERRY x2 with minimal milky output. G tube to gravity with moderate amount bile-green output.      Labs/Accuchecks: accuchecks q 4, daily labs.    Skin: Skin dry and generally ecchymotic. Woundcare done per orders, sacrum and heels with blanchable redness. Old KERRY and old fem arterial line site leaking serous fluid, collection bags in place. Turned q 2 to prevent breakdown, heel boots in place, specialty bed in use.     PoC reviewed with pt and family. All questions concerns addressed. VSS. WCTM.        "

## 2020-09-29 NOTE — PROGRESS NOTES
[facility]  Critical Care - Surgery  Progress Note    Patient Name: Elda Hernandez  MRN: [id number]  Admission Date: 9/3/2020  Hospital Length of Stay: 26 days  Code Status: Partial Code  Attending Provider: Clark Rodriguez MD  Primary Care Provider: Jordana Woods MD   Principal Problem: Perforated abdominal viscus    Subjective:     Hospital/ICU Course:  9/4: Pt admitted to SICU following ex lap for GI perf. Intubated, sedated. Wound vac in place.  Soon after arrival to unit a mottled appearance to RUE was noted.  Vascular consulted and US revealed R radial artery thrombosis.    9/5: Tachycardic..  R arm appearance greatly improved overnight.    9/6: Patient returned to OR for abdominal washout, closure. R arm with dopplerable signals to ulnar artery and palmar arch.   9/7: Attempted to wean patient off of sedation in an effort to move towards extubation. Patient weaned off of propofol. Precedex started. Patient's heart rate very labile and sensitize to sedation.   9/8: patient extubated  9/9: SHANICE. Labetalol PRN for HTN   9/10:  Diuresis increased.  Cr downtrending  9/11: Increased abdominal distension and elevated WBC count. CT abd  9/12: WBC to 45 today. Diflucan and zosyn initiated. Lactulose enema w/ improving mental status  9/13: WBC improved. Mental status improving. FWF initiated for hypernatremia.   9/14: White count continues to improve.  Oriented to person only this morning.    9/17: tolerating tube feeds, half TPN rate today. Possible step down   9/18: possible fall out of bed overnight. High G tube residual, now to gravity. Requiring comfortflo  9/19: s/p exlap. Intubated ans sedated. Weaning to extubate  9/20: No acute events overnight. Pt remains intubated, sedated. Plan to wean sedation for extubation.  9/21: Abdominal cultures grew Klebsiella; pt on zosyn  9/22: Pt did well with SBT yesterday; back on rate overnight.  Will try again today with hopes to extubate  9/23:  Extubated yesterday; doing well with nasal canula  9/24: remains on 3L NC. Diuresed yesterday w/ 240mg Lasix. Continue diuresis today with 40 Lasix BID  9/25: Blown L pupil / LUE weakness upon exam. CT head performed. CT abd/pelvis ordered.  9/26: Remains intubated. Pressure soft and H/H noted to be 6. Fluid resuscitation initiated. A-line not reading or drawing back.  9/27: Intubated. Ventilator settings weaned minimally. Hypotensive throughout the day. Continued fluid resuscitation.   9/28 - goals of care discussion. Now DNR. Continue medical management. Plan for repeat GoC if pressor requirement increases.     Interval History/Significant Events: no acute events overnight. Family meeting yesterday, DNR.       Follow-up For: Procedure(s) (LRB):  LAPAROTOMY, EXPLORATORY, DRAINAGE OF ABSCESS, REPAIR OF GASTRIC PERFORATION, GASTROSTOMY TUBE PLACEMENT (N/A)  APPLICATION, WOUND VAC (N/A)    Post-Operative Day: 10 Days Post-Op    Objective:     Vital Signs (Most Recent):  Temp: 99.4 °F (37.4 °C) (09/29/20 0315)  Pulse: 89 (09/29/20 0645)  Resp: 18 (09/29/20 0645)  BP: (!) 120/56 (09/29/20 0645)  SpO2: 100 % (09/29/20 0645) Vital Signs (24h Range):  Temp:  [98.3 °F (36.8 °C)-99.4 °F (37.4 °C)] 99.4 °F (37.4 °C)  Pulse:  [] 89  Resp:  [18-25] 18  SpO2:  [93 %-100 %] 100 %  BP: ()/(45-77) 120/56     Weight: 67.8 kg (149 lb 7.6 oz)  Body mass index is 30.19 kg/m².      Intake/Output Summary (Last 24 hours) at 9/29/2020 0751  Last data filed at 9/29/2020 0600  Gross per 24 hour   Intake 4457 ml   Output 2428 ml   Net 2029 ml       Physical Exam  Vitals signs and nursing note reviewed.   Constitutional:       Appearance: She is ill-appearing.   HENT:      Head: Normocephalic and atraumatic.      Nose:      Comments: NG tube in place     Mouth/Throat:      Mouth: Mucous membranes are dry.      Comments: Endotracheal tube in place  Eyes:      General: No scleral icterus.  Neck:      Comments: RIJ central  line  Cardiovascular:      Rate and Rhythm: Normal rate and regular rhythm.      Heart sounds: Normal heart sounds.   Pulmonary:      Breath sounds: Rhonchi: bilateral.      Comments: Mechanically ventilated.  Abdominal:      General: There is distension.      Palpations: Abdomen is soft.      Comments: Midline incision with wound vac in place; excellent seal to wound vac.   L abd drains in place with mild serous output; G tube to gravity.   Genitourinary:     Comments: L groin with a line in place.  Bhardwaj in place  Musculoskeletal:      Comments: LUE weakness noted.   Skin:     General: Skin is warm and dry.      Coloration: Skin is not jaundiced.   Neurological:      Mental Status: She is alert.      Cranial Nerves: No cranial nerve deficit.      Sensory: No sensory deficit.      Motor: Weakness present.      Comments: Sedated         Vents:  Vent Mode: A/C (09/29/20 0722)  Ventilator Initiated: Yes(chart correction) (09/25/20 1221)  Set Rate: 18 BPM (09/29/20 0722)  Vt Set: 400 mL (09/29/20 0722)  Pressure Support: 5 cmH20 (09/22/20 0443)  PEEP/CPAP: 8 cmH20 (09/29/20 0722)  Oxygen Concentration (%): 50 (09/29/20 0722)  Peak Airway Pressure: 26 cmH2O (09/29/20 0722)  Plateau Pressure: 23 cmH20 (09/29/20 0722)  Total Ve: 7.15 mL (09/29/20 0722)  Negative Inspiratory Force (cm H2O): -31 (09/22/20 1021)  F/VT Ratio<105 (RSBI): (!) 43.8 (09/29/20 0327)    Lines/Drains/Airways     Central Venous Catheter Line            Percutaneous Central Line Insertion/Assessment - Triple Lumen  09/04/20 0800 right internal jugular 24 days          Drain                 Urethral Catheter 09/14/20 1330 14 days         Closed/Suction Drain 09/18/20 1555 Left Abdomen Bulb 19 Fr. 10 days         Closed/Suction Drain 09/18/20 1559 Left Abdomen Bulb 19 Fr. 10 days         Gastrostomy/Enterostomy 09/18/20 1612 Other (see comments) LUQ decompression;feeding 10 days         NG/OG Tube 09/18/20 1500 Right nostril 10 days          Airway                  Airway - Non-Surgical 09/25/20 1217 Endotracheal Tube 3 days          Peripheral Intravenous Line                 Peripheral IV - Single Lumen 09/20/20 1017 22 G Posterior;Right Hand 8 days                Significant Labs:    CBC/Anemia Profile:  Recent Labs   Lab 09/28/20 0328 09/29/20  0300   WBC 15.95* 11.98   HGB 9.3* 8.5*   HCT 29.6* 27.2*    202   MCV 91 92   RDW 16.7* 17.5*        Chemistries:  Recent Labs   Lab 09/28/20 0328 09/29/20  0300    137   K 3.5 3.7    106   CO2 22* 22*   BUN 44* 34*   CREATININE 0.7 0.7   CALCIUM 8.3* 8.7   ALBUMIN 2.2* 2.7*   PROT 5.4* 5.5*   BILITOT 3.6* 4.2*   ALKPHOS 204* 225*   ALT 18 13   AST 37 28   MG 2.0 2.0   PHOS 2.9 2.7       All pertinent labs within the past 24 hours have been reviewed.    Significant Imaging:  I have reviewed and interpreted all pertinent imaging results/findings within the past 24 hours.    Assessment/Plan:     * Perforated abdominal viscus  Neuro:  - sedation: propofol @ 50  - analgesia: PRN oxy. Dilauded PRN  - haldol q6hr PRN for agitation  - Stroke symptoms 9/25. CT head obtained. Vascular neurology consulted.     CV:   - Hypotensive requiring fluid resuscitation and low-dose pressors.  - Currently on .04 of Levo  - LR infusion @ 65/hr  - femoral a-line placed 9/26 was removed due to dysfunction   - Strict monitoring of BP w/ cuff    Pulm:   - Extubated 9/22  - Oxygen requirements increased acutely 9/25.Satting in the mid-90's on 10L NC O2  - Intubated 9/25 due to continued respiratory decline.   - Bronchoscopy w/ lavage performed  - Ventilator settings weaned to 50 O2 and 8 Peep.  - SBT today  - Chest XR shows worsening pulmonary edema and bilateral pleural effusions. Possible need for drainage.  - Duonebs Q6 PRN     FEN/GI: Perforated stomach; s/p washout and patch (9/4); abdominal closure (9/6)  - s/p ex-lap on 9/19   - continue to hold tube feeds at this time  - NG tube, G tube, and L abdominal drains in  place. Purulent output from bulb drain noted.  - G tube upsized  - Lactulose discontinued (9/14) in setting of normal ammonia level (9/10) and improving mental status  - Daily metabolic panel with PRN repletion of electrolytes  - pantoprazole for ulcer ppx  - Cont TPN  - LR @ 65/hr     Renal:  - Continue to monitor with daily metabolic labs  - nephrology on board, appreciate recs  - Bun/Cr 34/0.7 today; continues to improve  - UOP appx 2065 yesterday. NET +2L yesterday   - Bhardwaj in place; monitoring I/Os     HEME/ID:   - Afebrile  - H/H stable. Current 8.5/27.2  - Daily CBC  - s/p Liver transplant in 2002; Daily tacrolimus level with AM labs. Per hepatology, continue tacrolimus 1mg BID  - heparin for DVT ppx  - Abdominal culture grew Klebsiella pneumonia; sensitive to zosyn  - ABX regimen adjusted to meropenum, and micafungin.    Endo:  - Synthroid 40mcg daily  - insulin aspart for blood glucose control    Dispo:  - Continue SICU care.    Elida Haley MD  Critical Care - Surgery PGY1  [facility]

## 2020-09-29 NOTE — SIGNIFICANT EVENT
Event Note:  Had discussion with  in regards to goals of care for patient. She has improved, though her gastric leak is non-operable, and is now off of pressors and tolerating an SBT. She is as of now a partial DNR and if we extubate, we discussed the need for re-intubation. After deliberating for some time, he expressed that he would like to try extubation and see if she will be able to communicate with him her desires about end of life and rescind her partial DNR. He states that he did not fully understand what it meant and now has a better understanding of her overall state. Her partial DNR order has been rescinded. And we will procee with extubation.      Mari Devries MD  General Surgery and Surgical Critical Care  [facility]

## 2020-09-29 NOTE — SUBJECTIVE & OBJECTIVE
Interval History/Significant Events: no acute events overnight. Family meeting yesterday, DNR.       Follow-up For: Procedure(s) (LRB):  LAPAROTOMY, EXPLORATORY, DRAINAGE OF ABSCESS, REPAIR OF GASTRIC PERFORATION, GASTROSTOMY TUBE PLACEMENT (N/A)  APPLICATION, WOUND VAC (N/A)    Post-Operative Day: 10 Days Post-Op    Objective:     Vital Signs (Most Recent):  Temp: 99.4 °F (37.4 °C) (09/29/20 0315)  Pulse: 89 (09/29/20 0645)  Resp: 18 (09/29/20 0645)  BP: (!) 120/56 (09/29/20 0645)  SpO2: 100 % (09/29/20 0645) Vital Signs (24h Range):  Temp:  [98.3 °F (36.8 °C)-99.4 °F (37.4 °C)] 99.4 °F (37.4 °C)  Pulse:  [] 89  Resp:  [18-25] 18  SpO2:  [93 %-100 %] 100 %  BP: ()/(45-77) 120/56     Weight: 67.8 kg (149 lb 7.6 oz)  Body mass index is 30.19 kg/m².      Intake/Output Summary (Last 24 hours) at 9/29/2020 0751  Last data filed at 9/29/2020 0600  Gross per 24 hour   Intake 4457 ml   Output 2428 ml   Net 2029 ml       Physical Exam  Vitals signs and nursing note reviewed.   Constitutional:       Appearance: She is ill-appearing.   HENT:      Head: Normocephalic and atraumatic.      Nose:      Comments: NG tube in place     Mouth/Throat:      Mouth: Mucous membranes are dry.      Comments: Endotracheal tube in place  Eyes:      General: No scleral icterus.  Neck:      Comments: RIJ central line  Cardiovascular:      Rate and Rhythm: Normal rate and regular rhythm.      Heart sounds: Normal heart sounds.   Pulmonary:      Breath sounds: Rhonchi: bilateral.      Comments: Mechanically ventilated.  Abdominal:      General: There is distension.      Palpations: Abdomen is soft.      Comments: Midline incision with wound vac in place; excellent seal to wound vac.   L abd drains in place with mild serous output; G tube to gravity.   Genitourinary:     Comments: L groin with a line in place.  Bhardwaj in place  Musculoskeletal:      Comments: LUE weakness noted.   Skin:     General: Skin is warm and dry.       Coloration: Skin is not jaundiced.   Neurological:      Mental Status: She is alert.      Cranial Nerves: No cranial nerve deficit.      Sensory: No sensory deficit.      Motor: Weakness present.      Comments: Sedated         Vents:  Vent Mode: A/C (09/29/20 0722)  Ventilator Initiated: Yes(chart correction) (09/25/20 1221)  Set Rate: 18 BPM (09/29/20 0722)  Vt Set: 400 mL (09/29/20 0722)  Pressure Support: 5 cmH20 (09/22/20 0443)  PEEP/CPAP: 8 cmH20 (09/29/20 0722)  Oxygen Concentration (%): 50 (09/29/20 0722)  Peak Airway Pressure: 26 cmH2O (09/29/20 0722)  Plateau Pressure: 23 cmH20 (09/29/20 0722)  Total Ve: 7.15 mL (09/29/20 0722)  Negative Inspiratory Force (cm H2O): -31 (09/22/20 1021)  F/VT Ratio<105 (RSBI): (!) 43.8 (09/29/20 0327)    Lines/Drains/Airways     Central Venous Catheter Line            Percutaneous Central Line Insertion/Assessment - Triple Lumen  09/04/20 0800 right internal jugular 24 days          Drain                 Urethral Catheter 09/14/20 1330 14 days         Closed/Suction Drain 09/18/20 1555 Left Abdomen Bulb 19 Fr. 10 days         Closed/Suction Drain 09/18/20 1559 Left Abdomen Bulb 19 Fr. 10 days         Gastrostomy/Enterostomy 09/18/20 1612 Other (see comments) LUQ decompression;feeding 10 days         NG/OG Tube 09/18/20 1500 Right nostril 10 days          Airway                 Airway - Non-Surgical 09/25/20 1217 Endotracheal Tube 3 days          Peripheral Intravenous Line                 Peripheral IV - Single Lumen 09/20/20 1017 22 G Posterior;Right Hand 8 days                Significant Labs:    CBC/Anemia Profile:  Recent Labs   Lab 09/28/20  0328 09/29/20  0300   WBC 15.95* 11.98   HGB 9.3* 8.5*   HCT 29.6* 27.2*    202   MCV 91 92   RDW 16.7* 17.5*        Chemistries:  Recent Labs   Lab 09/28/20  0328 09/29/20  0300    137   K 3.5 3.7    106   CO2 22* 22*   BUN 44* 34*   CREATININE 0.7 0.7   CALCIUM 8.3* 8.7   ALBUMIN 2.2* 2.7*   PROT 5.4* 5.5*    BILITOT 3.6* 4.2*   ALKPHOS 204* 225*   ALT 18 13   AST 37 28   MG 2.0 2.0   PHOS 2.9 2.7       All pertinent labs within the past 24 hours have been reviewed.    Significant Imaging:  I have reviewed and interpreted all pertinent imaging results/findings within the past 24 hours.

## 2020-09-29 NOTE — NURSING
Dr. Devries @ bedside. Updated on pt condition including vent settings, UOP downtrending (~50cc/hr), gtts, neuro status and vent setting. On SBT since 11:30 AM, tolerating spontaneous well. Dr Stevenson spoke with ,  made the decision to rescind DNR, pt now full code. Plan is to extubate. WCTM.

## 2020-09-29 NOTE — PLAN OF CARE
SW is following this Pt for DC planning needs. Discharge Disposition: TBD - pending patient progress    SW will continue to coordinate with patient, family, team and insurance to complete patient's discharge plan.    Tigist Moody LMSW   - Case Management

## 2020-09-30 LAB
ABO + RH BLD: NORMAL
ALBUMIN SERPL BCP-MCNC: 2.4 G/DL (ref 3.5–5.2)
ALP SERPL-CCNC: 284 U/L (ref 55–135)
ALT SERPL W/O P-5'-P-CCNC: 13 U/L (ref 10–44)
ANION GAP SERPL CALC-SCNC: 10 MMOL/L (ref 8–16)
AST SERPL-CCNC: 29 U/L (ref 10–40)
BASOPHILS # BLD AUTO: 0.06 K/UL (ref 0–0.2)
BASOPHILS NFR BLD: 0.4 % (ref 0–1.9)
BILIRUB SERPL-MCNC: 6.6 MG/DL (ref 0.1–1)
BLD GP AB SCN CELLS X3 SERPL QL: NORMAL
BUN SERPL-MCNC: 28 MG/DL (ref 6–20)
CALCIUM SERPL-MCNC: 8.7 MG/DL (ref 8.7–10.5)
CHLORIDE SERPL-SCNC: 109 MMOL/L (ref 95–110)
CO2 SERPL-SCNC: 21 MMOL/L (ref 23–29)
CREAT SERPL-MCNC: 0.6 MG/DL (ref 0.5–1.4)
DIFFERENTIAL METHOD: ABNORMAL
EOSINOPHIL # BLD AUTO: 0.3 K/UL (ref 0–0.5)
EOSINOPHIL NFR BLD: 1.8 % (ref 0–8)
ERYTHROCYTE [DISTWIDTH] IN BLOOD BY AUTOMATED COUNT: 17.6 % (ref 11.5–14.5)
EST. GFR  (AFRICAN AMERICAN): >60 ML/MIN/1.73 M^2
EST. GFR  (NON AFRICAN AMERICAN): >60 ML/MIN/1.73 M^2
GLUCOSE SERPL-MCNC: 95 MG/DL (ref 70–110)
HCT VFR BLD AUTO: 29.1 % (ref 37–48.5)
HGB BLD-MCNC: 8.9 G/DL (ref 12–16)
IMM GRANULOCYTES # BLD AUTO: 0.23 K/UL (ref 0–0.04)
IMM GRANULOCYTES NFR BLD AUTO: 1.6 % (ref 0–0.5)
LYMPHOCYTES # BLD AUTO: 1.2 K/UL (ref 1–4.8)
LYMPHOCYTES NFR BLD: 8.2 % (ref 18–48)
MAGNESIUM SERPL-MCNC: 1.9 MG/DL (ref 1.6–2.6)
MCH RBC QN AUTO: 28.1 PG (ref 27–31)
MCHC RBC AUTO-ENTMCNC: 30.6 G/DL (ref 32–36)
MCV RBC AUTO: 92 FL (ref 82–98)
MONOCYTES # BLD AUTO: 1.7 K/UL (ref 0.3–1)
MONOCYTES NFR BLD: 12.1 % (ref 4–15)
NEUTROPHILS # BLD AUTO: 10.9 K/UL (ref 1.8–7.7)
NEUTROPHILS NFR BLD: 75.9 % (ref 38–73)
NRBC BLD-RTO: 0 /100 WBC
PHOSPHATE SERPL-MCNC: 2.3 MG/DL (ref 2.7–4.5)
PLATELET # BLD AUTO: 206 K/UL (ref 150–350)
PMV BLD AUTO: 12 FL (ref 9.2–12.9)
POCT GLUCOSE: 101 MG/DL (ref 70–110)
POCT GLUCOSE: 106 MG/DL (ref 70–110)
POCT GLUCOSE: 114 MG/DL (ref 70–110)
POCT GLUCOSE: 121 MG/DL (ref 70–110)
POTASSIUM SERPL-SCNC: 3.5 MMOL/L (ref 3.5–5.1)
PROT SERPL-MCNC: 5.6 G/DL (ref 6–8.4)
RBC # BLD AUTO: 3.17 M/UL (ref 4–5.4)
SODIUM SERPL-SCNC: 140 MMOL/L (ref 136–145)
TACROLIMUS BLD-MCNC: 2.2 NG/ML (ref 5–15)
WBC # BLD AUTO: 14.35 K/UL (ref 3.9–12.7)

## 2020-09-30 PROCEDURE — B4185 PARENTERAL SOL 10 GM LIPIDS: HCPCS | Performed by: STUDENT IN AN ORGANIZED HEALTH CARE EDUCATION/TRAINING PROGRAM

## 2020-09-30 PROCEDURE — 20000000 HC ICU ROOM

## 2020-09-30 PROCEDURE — 99291 CRITICAL CARE FIRST HOUR: CPT | Mod: 24,GC,, | Performed by: STUDENT IN AN ORGANIZED HEALTH CARE EDUCATION/TRAINING PROGRAM

## 2020-09-30 PROCEDURE — 83735 ASSAY OF MAGNESIUM: CPT

## 2020-09-30 PROCEDURE — A4216 STERILE WATER/SALINE, 10 ML: HCPCS | Performed by: SURGERY

## 2020-09-30 PROCEDURE — 25000003 PHARM REV CODE 250: Performed by: INTERNAL MEDICINE

## 2020-09-30 PROCEDURE — 94668 MNPJ CHEST WALL SBSQ: CPT

## 2020-09-30 PROCEDURE — 63600175 PHARM REV CODE 636 W HCPCS: Performed by: STUDENT IN AN ORGANIZED HEALTH CARE EDUCATION/TRAINING PROGRAM

## 2020-09-30 PROCEDURE — 84100 ASSAY OF PHOSPHORUS: CPT

## 2020-09-30 PROCEDURE — 25000003 PHARM REV CODE 250: Performed by: STUDENT IN AN ORGANIZED HEALTH CARE EDUCATION/TRAINING PROGRAM

## 2020-09-30 PROCEDURE — A4217 STERILE WATER/SALINE, 500 ML: HCPCS | Performed by: STUDENT IN AN ORGANIZED HEALTH CARE EDUCATION/TRAINING PROGRAM

## 2020-09-30 PROCEDURE — 27000221 HC OXYGEN, UP TO 24 HOURS

## 2020-09-30 PROCEDURE — C9113 INJ PANTOPRAZOLE SODIUM, VIA: HCPCS | Performed by: STUDENT IN AN ORGANIZED HEALTH CARE EDUCATION/TRAINING PROGRAM

## 2020-09-30 PROCEDURE — 94761 N-INVAS EAR/PLS OXIMETRY MLT: CPT

## 2020-09-30 PROCEDURE — 36573 INSJ PICC RS&I 5 YR+: CPT

## 2020-09-30 PROCEDURE — 86850 RBC ANTIBODY SCREEN: CPT

## 2020-09-30 PROCEDURE — 80053 COMPREHEN METABOLIC PANEL: CPT

## 2020-09-30 PROCEDURE — 76937 US GUIDE VASCULAR ACCESS: CPT

## 2020-09-30 PROCEDURE — 25000003 PHARM REV CODE 250: Performed by: SURGERY

## 2020-09-30 PROCEDURE — 99291 PR CRITICAL CARE, E/M 30-74 MINUTES: ICD-10-PCS | Mod: 24,GC,, | Performed by: STUDENT IN AN ORGANIZED HEALTH CARE EDUCATION/TRAINING PROGRAM

## 2020-09-30 PROCEDURE — 85025 COMPLETE CBC W/AUTO DIFF WBC: CPT

## 2020-09-30 PROCEDURE — C1751 CATH, INF, PER/CENT/MIDLINE: HCPCS

## 2020-09-30 PROCEDURE — 80197 ASSAY OF TACROLIMUS: CPT

## 2020-09-30 PROCEDURE — 63600175 PHARM REV CODE 636 W HCPCS: Performed by: INTERNAL MEDICINE

## 2020-09-30 PROCEDURE — 63600175 PHARM REV CODE 636 W HCPCS: Performed by: SURGERY

## 2020-09-30 RX ORDER — FLUCONAZOLE 2 MG/ML
400 INJECTION, SOLUTION INTRAVENOUS
Status: DISCONTINUED | OUTPATIENT
Start: 2020-09-30 | End: 2020-10-12

## 2020-09-30 RX ORDER — BISACODYL 10 MG
10 SUPPOSITORY, RECTAL RECTAL DAILY
Status: DISCONTINUED | OUTPATIENT
Start: 2020-09-30 | End: 2020-10-14 | Stop reason: HOSPADM

## 2020-09-30 RX ORDER — SODIUM CHLORIDE 0.9 % (FLUSH) 0.9 %
10 SYRINGE (ML) INJECTION
Status: DISCONTINUED | OUTPATIENT
Start: 2020-09-30 | End: 2020-10-14 | Stop reason: HOSPADM

## 2020-09-30 RX ORDER — FUROSEMIDE 10 MG/ML
20 INJECTION INTRAMUSCULAR; INTRAVENOUS ONCE
Status: COMPLETED | OUTPATIENT
Start: 2020-09-30 | End: 2020-09-30

## 2020-09-30 RX ORDER — SODIUM CHLORIDE 0.9 % (FLUSH) 0.9 %
10 SYRINGE (ML) INJECTION EVERY 6 HOURS
Status: DISCONTINUED | OUTPATIENT
Start: 2020-09-30 | End: 2020-10-14 | Stop reason: HOSPADM

## 2020-09-30 RX ADMIN — HEPARIN SODIUM 5000 UNITS: 5000 INJECTION INTRAVENOUS; SUBCUTANEOUS at 05:09

## 2020-09-30 RX ADMIN — HEPARIN SODIUM 5000 UNITS: 5000 INJECTION INTRAVENOUS; SUBCUTANEOUS at 09:09

## 2020-09-30 RX ADMIN — POTASSIUM CHLORIDE 40 MEQ: 29.8 INJECTION, SOLUTION INTRAVENOUS at 06:09

## 2020-09-30 RX ADMIN — MEROPENEM 2 G: 1 INJECTION, POWDER, FOR SOLUTION INTRAVENOUS at 05:09

## 2020-09-30 RX ADMIN — HEPARIN SODIUM 5000 UNITS: 5000 INJECTION INTRAVENOUS; SUBCUTANEOUS at 01:09

## 2020-09-30 RX ADMIN — LEVOTHYROXINE SODIUM ANHYDROUS 40 MCG: 100 INJECTION, POWDER, LYOPHILIZED, FOR SOLUTION INTRAVENOUS at 08:09

## 2020-09-30 RX ADMIN — MAGNESIUM SULFATE HEPTAHYDRATE: 500 INJECTION, SOLUTION INTRAMUSCULAR; INTRAVENOUS at 09:09

## 2020-09-30 RX ADMIN — MEROPENEM 2 G: 1 INJECTION, POWDER, FOR SOLUTION INTRAVENOUS at 02:09

## 2020-09-30 RX ADMIN — HYDROMORPHONE HYDROCHLORIDE 0.5 MG: 1 INJECTION, SOLUTION INTRAMUSCULAR; INTRAVENOUS; SUBCUTANEOUS at 05:09

## 2020-09-30 RX ADMIN — PANTOPRAZOLE SODIUM 40 MG: 40 INJECTION, POWDER, LYOPHILIZED, FOR SOLUTION INTRAVENOUS at 08:09

## 2020-09-30 RX ADMIN — BISACODYL 10 MG: 10 SUPPOSITORY RECTAL at 10:09

## 2020-09-30 RX ADMIN — Medication 10 ML: at 01:09

## 2020-09-30 RX ADMIN — TACROLIMUS 1 MG: 1 CAPSULE ORAL at 08:09

## 2020-09-30 RX ADMIN — FLUCONAZOLE 400 MG: 2 INJECTION, SOLUTION INTRAVENOUS at 03:09

## 2020-09-30 RX ADMIN — SODIUM PHOSPHATE, MONOBASIC, MONOHYDRATE 15 MMOL: 276; 142 INJECTION, SOLUTION INTRAVENOUS at 06:09

## 2020-09-30 RX ADMIN — MEROPENEM 2 G: 1 INJECTION, POWDER, FOR SOLUTION INTRAVENOUS at 10:09

## 2020-09-30 RX ADMIN — Medication 10 ML: at 06:09

## 2020-09-30 RX ADMIN — FUROSEMIDE 20 MG: 10 INJECTION, SOLUTION INTRAMUSCULAR; INTRAVENOUS at 11:09

## 2020-09-30 RX ADMIN — SMOFLIPID 1 ML: 6; 6; 5; 3 INJECTION, EMULSION INTRAVENOUS at 09:09

## 2020-09-30 RX ADMIN — TACROLIMUS 0.5 MG: 0.5 CAPSULE ORAL at 06:09

## 2020-09-30 NOTE — ASSESSMENT & PLAN NOTE
Neuro:  - sedation: none  - analgesia: PRN oxy. Dilauded PRN  - haldol q6hr PRN for agitation  - Stroke symptoms 9/25. Improved currently.  CT head obtained. Vascular neurology consulted.     CV:   - Hypotensive requiring fluid resuscitation and low-dose pressors intermittently  - Currently off pressors  - LR infusion @ 65/hr  - femoral a-line placed 9/26 was removed due to dysfunction   - Strict monitoring of BP w/ cuff    Pulm:   - Extubated 9/22.  Reintubated 9/25.  Extubated 9/29.    - 5L NC; titrate supplemental O2 to maintain sats >92%  - CPT  - Duonebs Q6 PRN     FEN/GI: Perforated stomach; s/p washout and patch (9/4); abdominal closure (9/6)  - s/p ex-lap on 9/19   - continue to hold tube feeds at this time  - G tube, and L abdominal drains in place.  - G tube upsized  - Lactulose discontinued (9/14) in setting of normal ammonia level (9/10) and improving mental status  - Daily metabolic panel with PRN repletion of electrolytes  - pantoprazole for ulcer ppx  - Cont TPN  - LR @ 65/hr  - alk phos, Tbili elevated; pt w/ remote history of liver transplant.  CTM     Renal:  - Continue to monitor with daily metabolic labs  - nephrology on board, appreciate recs  - Bun/Cr 28/0.6 today; continues to improve  - Bhardwaj in place; monitoring I/Os     HEME/ID:   - Afebrile  - H/H stable.  - Daily CBC  - s/p Liver transplant in 2002; Daily tacrolimus level with AM labs. Per hepatology, continue tacrolimus 1mg BID  - heparin for DVT ppx  - Abdominal culture grew Klebsiella pneumonia; sensitive to zosyn, & Candida parapsilosis.  - ABX regimen adjusted to meropenum, and micafungin.  Will discuss with pharmacy regarding appropriate candidal coverage    Endo:  - Synthroid 40mcg daily  - insulin aspart for blood glucose control    Dispo:  - Continue SICU care.

## 2020-09-30 NOTE — TREATMENT PLAN
Hepatology Treatment Plan    Elda Hernandez is a 50 y.o. female admitted to hospital 9/3/2020 (Hospital Day: 28) due to Perforated abdominal viscus. Hepatology following for immunosuppression management.    Lab Results   Component Value Date    TACROLIMUS 2.2 (L) 09/30/2020    TACROLIMUS <1.5 (L) 09/29/2020    TACROLIMUS 2.0 (L) 09/28/2020       Lab Results   Component Value Date    CREATININE 0.6 09/30/2020    CREATININE 0.7 09/29/2020    CREATININE 0.7 09/28/2020       Lab Results   Component Value Date    ALT 13 09/30/2020    AST 29 09/30/2020     (H) 04/07/2018    ALKPHOS 284 (H) 09/30/2020    BILITOT 6.6 (H) 09/30/2020    WBC 14.35 (H) 09/30/2020    HGB 8.9 (L) 09/30/2020     09/30/2020       Kidney function is stable  Liver enzymes are worsening    Plan  - Continue immunosuppression regimen without changes    Immunosuppression Regimen:  Tacrolimus: 1mg qAM, 0.5mg qPM sublingual.    Please notify hepatology on day of discharge to discuss discharge medications and follow up.     Please obtain daily CBC, BMP, LFT, INR, immunosuppressant trough level    Thank you for involving us in the care of Elda Hernandez. Please call with any additional concerns or questions.    Fatuma Mathews MD  Gastroenterology Fellow

## 2020-09-30 NOTE — PROCEDURES
"Elda Hernandez is a 50 y.o. female patient.    Temp: 98.7 °F (37.1 °C) (09/30/20 1100)  Pulse: (!) 112 (09/30/20 1115)  Resp: (!) 30 (09/30/20 1115)  BP: (!) 162/70 (09/30/20 1115)  SpO2: (!) 94 % (09/30/20 1115)  Weight: 67.8 kg (149 lb 7.6 oz) (09/29/20 0459)  Height: 4' 11" (149.9 cm) (09/29/20 1135)    PICC  Date/Time: 9/30/2020 11:40 AM  Performed by: Komal Beth RN  Assisting provider: Eli Del Cid LPN  Consent Done: Yes  Time out: Immediately prior to procedure a time out was called to verify the correct patient, procedure, equipment, support staff and site/side marked as required  Indications: med administration and vascular access  Anesthesia: local infiltration  Local anesthetic: lidocaine 1% without epinephrine  Anesthetic Total (mL): 2  Preparation: skin prepped with ChloraPrep  Skin prep agent dried: skin prep agent completely dried prior to procedure  Sterile barriers: all five maximum sterile barriers used - cap, mask, sterile gown, sterile gloves, and large sterile sheet  Hand hygiene: hand hygiene performed prior to central venous catheter insertion  Location details: right basilic  Catheter type: double lumen  Catheter size: 5 Fr  Catheter Length: 33cm    Ultrasound guidance: yes  Vessel Caliber: medium and patent, compressibility normal  Vascular Doppler: not done  Needle advanced into vessel with real time Ultrasound guidance.  Guidewire confirmed in vessel.  Image recorded and saved.  Sterile sheath used.  Number of attempts: 1  Post-procedure: blood return through all ports, chlorhexidine patch and sterile dressing applied  Technical procedures used: 3CG  Specimens: No  Implants: No  Assessment: placement verified by x-ray  Complications: none          Eli Del Cid  9/30/2020  "

## 2020-09-30 NOTE — ASSESSMENT & PLAN NOTE
50 y.o. female w/ free air on CT scan. S/p emergent ex lap on 9/4 w/ findings of gastric perforation, primary repair. Now w/  abdominal closure on 9/6, skin stapled closed, KERRY drains removed. Class A to OR on 9/18 for free air and extrav or oral contrast seen on CT. CT on 9/25 redemonstrated contrast leaking from stomach, but well-drained with surgical drain.    Continue SICU care, appreciate their assistance  No operative intervention for gastric leaking at this time. Leak controlled with elian drain. Tissue is so friable that repeat operative intervention around the G tube will not provide a long-term solution.   G tube and NG to gravity/suction  Wound vac changes w/ wound care  Recommend ongoing goals of care discussion with the patient's  - DNR

## 2020-09-30 NOTE — SUBJECTIVE & OBJECTIVE
Interval History: No acute events overnight. NGT fell out overnight. Still with ascitic drainage from prior abdominal drain site and left groin a line site.    Medications:  Continuous Infusions:   dexmedetomidine (PRECEDEX) infusion Stopped (09/29/20 1430)    lactated ringers 65 mL/hr at 09/30/20 0600    norepinephrine bitartrate-D5W Stopped (09/29/20 1035)    propofoL Stopped (09/29/20 1115)    TPN ADULT CENTRAL LINE CUSTOM 60 mL/hr at 09/30/20 0600     Scheduled Meds:   heparin (porcine)  5,000 Units Subcutaneous Q8H    levothyroxine  40 mcg Intravenous Daily    meropenem (MERREM) IVPB  2 g Intravenous Q8H    micafungin (MYCAMINE) IVPB  100 mg Intravenous Q24H    pantoprazole  40 mg Intravenous Daily    tacrolimus  0.5 mg Sublingual Daily PM    tacrolimus  1 mg Sublingual Daily AM     PRN Meds:acetaminophen, albuterol-ipratropium, calcium gluconate IVPB, calcium gluconate IVPB, calcium gluconate IVPB, dextrose 50%, dextrose 50%, glucagon (human recombinant), glucose, glucose, haloperidol lactate, HYDROmorphone, insulin aspart U-100, iohexol, magnesium sulfate IVPB, magnesium sulfate IVPB, ondansetron, potassium chloride in water **AND** potassium chloride in water **AND** potassium chloride in water, promethazine, sodium chloride 0.9%, sodium phosphate IVPB, sodium phosphate IVPB, sodium phosphate IVPB     Review of patient's allergies indicates:   Allergen Reactions    Codeine Itching     Other reaction(s): Itching    Lipitor [atorvastatin] Other (See Comments)     Other reaction(s): Muscle pain  Muscle cranmps    Morphine Itching     Other reaction(s): nausea and vomiting     Zoloft [sertraline] Other (See Comments)     Tremors/muscle spasms     Objective:     Vital Signs (Most Recent):  Temp: 98.5 °F (36.9 °C) (09/30/20 0300)  Pulse: (!) 115 (09/30/20 0500)  Resp: (!) 22 (09/30/20 0500)  BP: (!) 172/82 (09/30/20 0500)  SpO2: (!) 93 % (09/30/20 0500) Vital Signs (24h Range):  Temp:  [98.2 °F  (36.8 °C)-99.2 °F (37.3 °C)] 98.5 °F (36.9 °C)  Pulse:  [] 115  Resp:  [11-60] 22  SpO2:  [84 %-100 %] 93 %  BP: (100-177)/(48-84) 172/82     Weight: 67.8 kg (149 lb 7.6 oz)  Body mass index is 30.19 kg/m².    Intake/Output - Last 3 Shifts       09/28 0700 - 09/29 0659 09/29 0700 - 09/30 0659    I.V. (mL/kg) 3073 (45.3) 3015.6 (44.5)    TPN 1384 584    Total Intake(mL/kg) 4457 (65.7) 3599.6 (53.1)    Urine (mL/kg/hr) 2205 (1.4) 3060 (1.9)    Drains 283 294    Other 0 150    Stool  0    Total Output 2488 3504    Net +1969 +95.6          Stool Occurrence  1 x          Physical Exam  Vitals signs and nursing note reviewed.   Constitutional:       Appearance: She is ill-appearing.   HENT:      Head: Normocephalic and atraumatic.      Nose:      Comments: NG tube in place     Mouth/Throat:      Mouth: Mucous membranes are dry.      Comments: Endotracheal tube in place  Eyes:      General: No scleral icterus.  Neck:      Comments: RIJ central line  Cardiovascular:      Rate and Rhythm: Normal rate and regular rhythm.      Heart sounds: Normal heart sounds.   Pulmonary:      Breath sounds: Rhonchi: bilateral.   Abdominal:      General: There is distension.      Palpations: Abdomen is soft.      Comments: Midline incision with wound vac in place; excellent seal to wound vac.   L abd drains in place with mild serous output; G tube to gravity.   Genitourinary:     Comments: L groin a line removed, now with drainage from insertion site  Bhardwaj in place  Musculoskeletal:      Comments: LUE weakness noted.   Skin:     General: Skin is warm and dry.      Coloration: Skin is not jaundiced.   Neurological:      Mental Status: She is alert.      Cranial Nerves: No cranial nerve deficit.      Sensory: No sensory deficit.      Motor: Weakness present.         Significant Labs:  CBC:   Recent Labs   Lab 09/30/20  0259   WBC 14.35*   RBC 3.17*   HGB 8.9*   HCT 29.1*      MCV 92   MCH 28.1   MCHC 30.6*     CMP:   Recent Labs    Lab 09/30/20  0259   GLU 95   CALCIUM 8.7   ALBUMIN 2.4*   PROT 5.6*      K 3.5   CO2 21*      BUN 28*   CREATININE 0.6   ALKPHOS 284*   ALT 13   AST 29   BILITOT 6.6*       Significant Diagnostics:  I have reviewed all pertinent imaging results/findings within the past 24 hours.

## 2020-09-30 NOTE — SUBJECTIVE & OBJECTIVE
Interval History/Significant Events: NAEO.  Pt did well on 5L NC last night.  Denies new complaints this morning.  Still reports tenderness around drain sites.  Oriented to person/place/time.      Follow-up For: Procedure(s) (LRB):  LAPAROTOMY, EXPLORATORY, DRAINAGE OF ABSCESS, REPAIR OF GASTRIC PERFORATION, GASTROSTOMY TUBE PLACEMENT (N/A)  APPLICATION, WOUND VAC (N/A)    Post-Operative Day: 12 Days Post-Op    Objective:     Vital Signs (Most Recent):  Temp: 98.5 °F (36.9 °C) (09/30/20 0300)  Pulse: (!) 115 (09/30/20 0500)  Resp: (!) 22 (09/30/20 0500)  BP: (!) 172/82 (09/30/20 0500)  SpO2: (!) 93 % (09/30/20 0500) Vital Signs (24h Range):  Temp:  [98.2 °F (36.8 °C)-99.2 °F (37.3 °C)] 98.5 °F (36.9 °C)  Pulse:  [] 115  Resp:  [11-60] 22  SpO2:  [84 %-100 %] 93 %  BP: (100-177)/(48-84) 172/82     Weight: 67.8 kg (149 lb 7.6 oz)  Body mass index is 30.19 kg/m².      Intake/Output Summary (Last 24 hours) at 9/30/2020 0649  Last data filed at 9/30/2020 0600  Gross per 24 hour   Intake 3599.6 ml   Output 3504 ml   Net 95.6 ml       Physical Exam  Vitals signs and nursing note reviewed.   Constitutional:       Appearance: She is ill-appearing.   HENT:      Head: Normocephalic and atraumatic.      Nose:      Comments: NG tube in place     Mouth/Throat:      Mouth: Mucous membranes are dry.      Comments: Endotracheal tube in place  Eyes:      General: No scleral icterus.     Extraocular Movements: Extraocular movements intact.   Neck:      Musculoskeletal: Normal range of motion.      Comments: RIJ central line  Cardiovascular:      Rate and Rhythm: Normal rate and regular rhythm.      Heart sounds: Normal heart sounds.   Pulmonary:      Breath sounds: Rhonchi: bilateral.      Comments: 5L NC  Abdominal:      General: There is distension.      Palpations: Abdomen is soft.      Comments: Midline incision with wound vac in place; excellent seal to wound vac.   L abd drains in place with mild serous output; G tube to  gravity.   Genitourinary:     Comments: Bhardwaj in place  Musculoskeletal:      Comments: LUE weakness noted.   Skin:     General: Skin is warm and dry.      Coloration: Skin is not jaundiced.   Neurological:      Mental Status: She is alert and oriented to person, place, and time.      Cranial Nerves: No cranial nerve deficit.      Sensory: No sensory deficit.      Comments: Sedated         Vents:  Vent Mode: Spont (09/29/20 1135)  Ventilator Initiated: Yes(chart correction) (09/25/20 1221)  Set Rate: 18 BPM (09/29/20 0722)  Vt Set: 400 mL (09/29/20 0722)  Pressure Support: 14 cmH20 (09/29/20 1135)  PEEP/CPAP: 8 cmH20 (09/29/20 1135)  Oxygen Concentration (%): 50 (09/29/20 1515)  Peak Airway Pressure: 13 cmH2O (09/29/20 1135)  Plateau Pressure: 23 cmH20 (09/29/20 0722)  Total Ve: 9.18 mL (09/29/20 1135)  Negative Inspiratory Force (cm H2O): -34 (09/29/20 1614)  F/VT Ratio<105 (RSBI): (!) 43.8 (09/29/20 0327)    Lines/Drains/Airways     Central Venous Catheter Line            Percutaneous Central Line Insertion/Assessment - Triple Lumen  09/04/20 0800 right internal jugular 25 days          Drain                 Urethral Catheter 09/14/20 1330 15 days         Closed/Suction Drain 09/18/20 1555 Left Abdomen Bulb 19 Fr. 11 days         Closed/Suction Drain 09/18/20 1559 Left Abdomen Bulb 19 Fr. 11 days         Gastrostomy/Enterostomy 09/18/20 1612 Other (see comments) LUQ decompression;feeding 11 days         NG/OG Tube 09/18/20 1500 Right nostril 11 days          Peripheral Intravenous Line                 Peripheral IV - Single Lumen 09/29/20 1051 22 G Right Hand less than 1 day                Significant Labs:    CBC/Anemia Profile:  Recent Labs   Lab 09/29/20  0300 09/30/20  0259   WBC 11.98 14.35*   HGB 8.5* 8.9*   HCT 27.2* 29.1*    206   MCV 92 92   RDW 17.5* 17.6*        Chemistries:  Recent Labs   Lab 09/29/20  0300 09/30/20  0259    140   K 3.7 3.5    109   CO2 22* 21*   BUN 34* 28*    CREATININE 0.7 0.6   CALCIUM 8.7 8.7   ALBUMIN 2.7* 2.4*   PROT 5.5* 5.6*   BILITOT 4.2* 6.6*   ALKPHOS 225* 284*   ALT 13 13   AST 28 29   MG 2.0 1.9   PHOS 2.7 2.3*       All pertinent labs within the past 24 hours have been reviewed.    Significant Imaging:  I have reviewed all pertinent imaging results/findings within the past 24 hours.

## 2020-09-30 NOTE — CONSULTS
D/L PICC placed in R BASILIC vein, 33cm in length with 0cm exposed. Arm circumference 34cm. Lot#[id number]

## 2020-09-30 NOTE — PROGRESS NOTES
[facility]  General Surgery  Progress Note    Subjective:     History of Present Illness:  Ms. Hernandez is a 51yo F w/PMH of von Gierke disease s/p liver transplant (2002, on tacro + MMF, follows Dr. Nielsen), hx chronic rejection (bx 2015 with acute and chronic rejection s/p steroids), biliary stricture and ductopenic rejection, recently with cirrhosis on fibroscan (10/2019), HTN, and depression who presents as a transfer for further evaluation of gastric outlet obstruction and esophageal stricturing.  Patient decompensated requiring multiple pressors and intubation. CT scan showed free air. Prior to intubation patient reported severe abdominal pain.   states that patient has had epigastric pain, nausea, and vomiting for several days.     Post-Op Info:  Procedure(s) (LRB):  LAPAROTOMY, EXPLORATORY, DRAINAGE OF ABSCESS, REPAIR OF GASTRIC PERFORATION, GASTROSTOMY TUBE PLACEMENT (N/A)  APPLICATION, WOUND VAC (N/A)   12 Days Post-Op     Interval History: No acute events overnight. NGT fell out overnight. Still with ascitic drainage from prior abdominal drain site and left groin a line site.    Medications:  Continuous Infusions:   dexmedetomidine (PRECEDEX) infusion Stopped (09/29/20 1430)    lactated ringers 65 mL/hr at 09/30/20 0600    norepinephrine bitartrate-D5W Stopped (09/29/20 1035)    propofoL Stopped (09/29/20 1115)    TPN ADULT CENTRAL LINE CUSTOM 60 mL/hr at 09/30/20 0600     Scheduled Meds:   heparin (porcine)  5,000 Units Subcutaneous Q8H    levothyroxine  40 mcg Intravenous Daily    meropenem (MERREM) IVPB  2 g Intravenous Q8H    micafungin (MYCAMINE) IVPB  100 mg Intravenous Q24H    pantoprazole  40 mg Intravenous Daily    tacrolimus  0.5 mg Sublingual Daily PM    tacrolimus  1 mg Sublingual Daily AM     PRN Meds:acetaminophen, albuterol-ipratropium, calcium gluconate IVPB, calcium gluconate IVPB, calcium gluconate IVPB, dextrose 50%, dextrose 50%, glucagon (human  recombinant), glucose, glucose, haloperidol lactate, HYDROmorphone, insulin aspart U-100, iohexol, magnesium sulfate IVPB, magnesium sulfate IVPB, ondansetron, potassium chloride in water **AND** potassium chloride in water **AND** potassium chloride in water, promethazine, sodium chloride 0.9%, sodium phosphate IVPB, sodium phosphate IVPB, sodium phosphate IVPB     Review of patient's allergies indicates:   Allergen Reactions    Codeine Itching     Other reaction(s): Itching    Lipitor [atorvastatin] Other (See Comments)     Other reaction(s): Muscle pain  Muscle cranmps    Morphine Itching     Other reaction(s): nausea and vomiting     Zoloft [sertraline] Other (See Comments)     Tremors/muscle spasms     Objective:     Vital Signs (Most Recent):  Temp: 98.5 °F (36.9 °C) (09/30/20 0300)  Pulse: (!) 115 (09/30/20 0500)  Resp: (!) 22 (09/30/20 0500)  BP: (!) 172/82 (09/30/20 0500)  SpO2: (!) 93 % (09/30/20 0500) Vital Signs (24h Range):  Temp:  [98.2 °F (36.8 °C)-99.2 °F (37.3 °C)] 98.5 °F (36.9 °C)  Pulse:  [] 115  Resp:  [11-60] 22  SpO2:  [84 %-100 %] 93 %  BP: (100-177)/(48-84) 172/82     Weight: 67.8 kg (149 lb 7.6 oz)  Body mass index is 30.19 kg/m².    Intake/Output - Last 3 Shifts       09/28 0700 - 09/29 0659 09/29 0700 - 09/30 0659    I.V. (mL/kg) 3073 (45.3) 3015.6 (44.5)    TPN 1384 584    Total Intake(mL/kg) 4457 (65.7) 3599.6 (53.1)    Urine (mL/kg/hr) 2205 (1.4) 3060 (1.9)    Drains 283 294    Other 0 150    Stool  0    Total Output 2488 3504    Net +1969 +95.6          Stool Occurrence  1 x          Physical Exam  Vitals signs and nursing note reviewed.   Constitutional:       Appearance: She is ill-appearing.   HENT:      Head: Normocephalic and atraumatic.      Nose:      Comments: NG tube in place     Mouth/Throat:      Mouth: Mucous membranes are dry.      Comments: Endotracheal tube in place  Eyes:      General: No scleral icterus.  Neck:      Comments: RI central  line  Cardiovascular:      Rate and Rhythm: Normal rate and regular rhythm.      Heart sounds: Normal heart sounds.   Pulmonary:      Breath sounds: Rhonchi: bilateral.   Abdominal:      General: There is distension.      Palpations: Abdomen is soft.      Comments: Midline incision with wound vac in place; excellent seal to wound vac.   L abd drains in place with mild serous output; G tube to gravity.   Genitourinary:     Comments: L groin a line removed, now with drainage from insertion site  Bhardwaj in place  Musculoskeletal:      Comments: LUE weakness noted.   Skin:     General: Skin is warm and dry.      Coloration: Skin is not jaundiced.   Neurological:      Mental Status: She is alert.      Cranial Nerves: No cranial nerve deficit.      Sensory: No sensory deficit.      Motor: Weakness present.         Significant Labs:  CBC:   Recent Labs   Lab 09/30/20 0259   WBC 14.35*   RBC 3.17*   HGB 8.9*   HCT 29.1*      MCV 92   MCH 28.1   MCHC 30.6*     CMP:   Recent Labs   Lab 09/30/20 0259   GLU 95   CALCIUM 8.7   ALBUMIN 2.4*   PROT 5.6*      K 3.5   CO2 21*      BUN 28*   CREATININE 0.6   ALKPHOS 284*   ALT 13   AST 29   BILITOT 6.6*       Significant Diagnostics:  I have reviewed all pertinent imaging results/findings within the past 24 hours.    Assessment/Plan:     * Perforated abdominal viscus  50 y.o. female w/ free air on CT scan. S/p emergent ex lap on 9/4 w/ findings of gastric perforation, primary repair. Now w/  abdominal closure on 9/6, skin stapled closed, KERRY drains removed. Class A to OR on 9/18 for free air and extrav or oral contrast seen on CT. CT on 9/25 redemonstrated contrast leaking from stomach, but well-drained with surgical drain.    Continue SICU care, appreciate their assistance  No operative intervention for gastric leaking at this time. Leak controlled with elian drain. Tissue is so friable that repeat operative intervention around the G tube will not provide a  long-term solution.   G tube and NG to gravity/suction  Wound vac changes w/ wound care  Recommend ongoing goals of care discussion with the patient's  - DNR        Rocio Hidalgo MD  General Surgery  [facility]

## 2020-09-30 NOTE — PROGRESS NOTES
[facility]  Critical Care - Surgery  Progress Note    Patient Name: Elda Hernandez  MRN: [id number]  Admission Date: 9/3/2020  Hospital Length of Stay: 27 days  Code Status: Full Code  Attending Provider: Clark Rodriguez MD  Primary Care Provider: Jordana Woods MD   Principal Problem: Perforated abdominal viscus    Subjective:     Hospital/ICU Course:  9/4: Pt admitted to SICU following ex lap for GI perf. Intubated, sedated. Wound vac in place.  Soon after arrival to unit a mottled appearance to RUE was noted.  Vascular consulted and US revealed R radial artery thrombosis.    9/5: Tachycardic..  R arm appearance greatly improved overnight.    9/6: Patient returned to OR for abdominal washout, closure. R arm with dopplerable signals to ulnar artery and palmar arch.   9/7: Attempted to wean patient off of sedation in an effort to move towards extubation. Patient weaned off of propofol. Precedex started. Patient's heart rate very labile and sensitize to sedation.   9/8: patient extubated  9/9: SHANICE. Labetalol PRN for HTN   9/10:  Diuresis increased.  Cr downtrending  9/11: Increased abdominal distension and elevated WBC count. CT abd  9/12: WBC to 45 today. Diflucan and zosyn initiated. Lactulose enema w/ improving mental status  9/13: WBC improved. Mental status improving. FWF initiated for hypernatremia.   9/14: White count continues to improve.  Oriented to person only this morning.    9/17: tolerating tube feeds, half TPN rate today. Possible step down   9/18: possible fall out of bed overnight. High G tube residual, now to gravity. Requiring comfortflo  9/19: s/p exlap. Intubated ans sedated. Weaning to extubate  9/20: No acute events overnight. Pt remains intubated, sedated. Plan to wean sedation for extubation.  9/21: Abdominal cultures grew Klebsiella; pt on zosyn  9/22: Pt did well with SBT yesterday; back on rate overnight.  Will try again today with hopes to extubate  9/23:  Extubated yesterday; doing well with nasal canula  9/24: remains on 3L NC. Diuresed yesterday w/ 240mg Lasix. Continue diuresis today with 40 Lasix BID  9/25: Blown L pupil / LUE weakness upon exam. CT head performed. CT abd/pelvis ordered.  9/26: Remains intubated. Pressure soft and H/H noted to be 6. Fluid resuscitation initiated. A-line not reading or drawing back.  9/27: Intubated. Ventilator settings weaned minimally. Hypotensive throughout the day. Continued fluid resuscitation.   9/28 - goals of care discussion. Now DNR. Continue medical management. Plan for repeat GoC if pressor requirement increases.   9/29 - pt extubated.  Returned to full code per husbands wishes      Interval History/Significant Events: NAEO.  Pt did well on 5L NC last night.  Denies new complaints this morning.  Still reports tenderness around drain sites.  Oriented to person/place/time.      Follow-up For: Procedure(s) (LRB):  LAPAROTOMY, EXPLORATORY, DRAINAGE OF ABSCESS, REPAIR OF GASTRIC PERFORATION, GASTROSTOMY TUBE PLACEMENT (N/A)  APPLICATION, WOUND VAC (N/A)    Post-Operative Day: 12 Days Post-Op    Objective:     Vital Signs (Most Recent):  Temp: 98.5 °F (36.9 °C) (09/30/20 0300)  Pulse: (!) 115 (09/30/20 0500)  Resp: (!) 22 (09/30/20 0500)  BP: (!) 172/82 (09/30/20 0500)  SpO2: (!) 93 % (09/30/20 0500) Vital Signs (24h Range):  Temp:  [98.2 °F (36.8 °C)-99.2 °F (37.3 °C)] 98.5 °F (36.9 °C)  Pulse:  [] 115  Resp:  [11-60] 22  SpO2:  [84 %-100 %] 93 %  BP: (100-177)/(48-84) 172/82     Weight: 67.8 kg (149 lb 7.6 oz)  Body mass index is 30.19 kg/m².      Intake/Output Summary (Last 24 hours) at 9/30/2020 0649  Last data filed at 9/30/2020 0600  Gross per 24 hour   Intake 3599.6 ml   Output 3504 ml   Net 95.6 ml       Physical Exam  Vitals signs and nursing note reviewed.   Constitutional:       Appearance: She is ill-appearing.   HENT:      Head: Normocephalic and atraumatic.      Nose:      Comments: NG tube in place      Mouth/Throat:      Mouth: Mucous membranes are dry.      Comments: Endotracheal tube in place  Eyes:      General: No scleral icterus.     Extraocular Movements: Extraocular movements intact.   Neck:      Musculoskeletal: Normal range of motion.      Comments: RIJ central line  Cardiovascular:      Rate and Rhythm: Normal rate and regular rhythm.      Heart sounds: Normal heart sounds.   Pulmonary:      Breath sounds: Rhonchi: bilateral.      Comments: 5L NC  Abdominal:      General: There is distension.      Palpations: Abdomen is soft.      Comments: Midline incision with wound vac in place; excellent seal to wound vac.   L abd drains in place with mild serous output; G tube to gravity.   Genitourinary:     Comments: Bhardwaj in place  Musculoskeletal:      Comments: LUE weakness noted.   Skin:     General: Skin is warm and dry.      Coloration: Skin is not jaundiced.   Neurological:      Mental Status: She is alert and oriented to person, place, and time.      Cranial Nerves: No cranial nerve deficit.      Sensory: No sensory deficit.      Comments: Sedated         Vents:  Vent Mode: Spont (09/29/20 1135)  Ventilator Initiated: Yes(chart correction) (09/25/20 1221)  Set Rate: 18 BPM (09/29/20 0722)  Vt Set: 400 mL (09/29/20 0722)  Pressure Support: 14 cmH20 (09/29/20 1135)  PEEP/CPAP: 8 cmH20 (09/29/20 1135)  Oxygen Concentration (%): 50 (09/29/20 1515)  Peak Airway Pressure: 13 cmH2O (09/29/20 1135)  Plateau Pressure: 23 cmH20 (09/29/20 0722)  Total Ve: 9.18 mL (09/29/20 1135)  Negative Inspiratory Force (cm H2O): -34 (09/29/20 1614)  F/VT Ratio<105 (RSBI): (!) 43.8 (09/29/20 0327)    Lines/Drains/Airways     Central Venous Catheter Line            Percutaneous Central Line Insertion/Assessment - Triple Lumen  09/04/20 0800 right internal jugular 25 days          Drain                 Urethral Catheter 09/14/20 1330 15 days         Closed/Suction Drain 09/18/20 1555 Left Abdomen Bulb 19 Fr. 11 days          Closed/Suction Drain 09/18/20 1559 Left Abdomen Bulb 19 Fr. 11 days         Gastrostomy/Enterostomy 09/18/20 1612 Other (see comments) LUQ decompression;feeding 11 days         NG/OG Tube 09/18/20 1500 Right nostril 11 days          Peripheral Intravenous Line                 Peripheral IV - Single Lumen 09/29/20 1051 22 G Right Hand less than 1 day                Significant Labs:    CBC/Anemia Profile:  Recent Labs   Lab 09/29/20  0300 09/30/20  0259   WBC 11.98 14.35*   HGB 8.5* 8.9*   HCT 27.2* 29.1*    206   MCV 92 92   RDW 17.5* 17.6*        Chemistries:  Recent Labs   Lab 09/29/20  0300 09/30/20  0259    140   K 3.7 3.5    109   CO2 22* 21*   BUN 34* 28*   CREATININE 0.7 0.6   CALCIUM 8.7 8.7   ALBUMIN 2.7* 2.4*   PROT 5.5* 5.6*   BILITOT 4.2* 6.6*   ALKPHOS 225* 284*   ALT 13 13   AST 28 29   MG 2.0 1.9   PHOS 2.7 2.3*       All pertinent labs within the past 24 hours have been reviewed.    Significant Imaging:  I have reviewed all pertinent imaging results/findings within the past 24 hours.    Assessment/Plan:     * Perforated abdominal viscus  Neuro:  - sedation: none  - analgesia: PRN oxy. Dilauded PRN  - haldol q6hr PRN for agitation  - Stroke symptoms 9/25. Improved currently.  CT head obtained. Vascular neurology consulted.     CV:   - Hypotensive requiring fluid resuscitation and low-dose pressors intermittently  - Currently off pressors  - LR infusion @ 65/hr  - femoral a-line placed 9/26 was removed due to dysfunction   - Strict monitoring of BP w/ cuff    Pulm:   - Extubated 9/22.  Reintubated 9/25.  Extubated 9/29.    - 5L NC; titrate supplemental O2 to maintain sats >92%  - CPT  - Duonebs Q6 PRN     FEN/GI: Perforated stomach; s/p washout and patch (9/4); abdominal closure (9/6)  - s/p ex-lap on 9/19   - continue to hold tube feeds at this time  - G tube, and L abdominal drains in place.  - G tube upsized  - Lactulose discontinued (9/14) in setting of normal ammonia level  (9/10) and improving mental status  - Daily metabolic panel with PRN repletion of electrolytes  - pantoprazole for ulcer ppx  - Cont TPN  - LR @ 65/hr  - alk phos, Tbili elevated; pt w/ remote history of liver transplant.  CTM     Renal:  - Continue to monitor with daily metabolic labs  - nephrology on board, appreciate recs  - Bun/Cr 28/0.6 today; continues to improve  - Bhardwaj in place; monitoring I/Os     HEME/ID:   - Afebrile  - H/H stable.  - Daily CBC  - s/p Liver transplant in 2002; Daily tacrolimus level with AM labs. Per hepatology, continue tacrolimus 1mg BID  - heparin for DVT ppx  - Abdominal culture grew Klebsiella pneumonia; sensitive to zosyn, & Candida parapsilosis.  - ABX regimen adjusted to meropenum, and micafungin.  Will discuss with pharmacy regarding appropriate candidal coverage    Endo:  - Synthroid 40mcg daily  - insulin aspart for blood glucose control    Dispo:  - Continue SICU care.       John Spivey MD  Critical Care - Surgery  [facility]

## 2020-10-01 LAB
ALBUMIN SERPL BCP-MCNC: 2.1 G/DL (ref 3.5–5.2)
ALP SERPL-CCNC: 309 U/L (ref 55–135)
ALT SERPL W/O P-5'-P-CCNC: 16 U/L (ref 10–44)
ANION GAP SERPL CALC-SCNC: 10 MMOL/L (ref 8–16)
AST SERPL-CCNC: 32 U/L (ref 10–40)
BASOPHILS # BLD AUTO: 0.05 K/UL (ref 0–0.2)
BASOPHILS NFR BLD: 0.4 % (ref 0–1.9)
BILIRUB DIRECT SERPL-MCNC: 5.1 MG/DL (ref 0.1–0.3)
BILIRUB SERPL-MCNC: 7 MG/DL (ref 0.1–1)
BUN SERPL-MCNC: 26 MG/DL (ref 6–20)
CALCIUM SERPL-MCNC: 8.6 MG/DL (ref 8.7–10.5)
CHLORIDE SERPL-SCNC: 109 MMOL/L (ref 95–110)
CO2 SERPL-SCNC: 22 MMOL/L (ref 23–29)
CREAT SERPL-MCNC: 0.6 MG/DL (ref 0.5–1.4)
DIFFERENTIAL METHOD: ABNORMAL
EOSINOPHIL # BLD AUTO: 0.4 K/UL (ref 0–0.5)
EOSINOPHIL NFR BLD: 3 % (ref 0–8)
ERYTHROCYTE [DISTWIDTH] IN BLOOD BY AUTOMATED COUNT: 17.3 % (ref 11.5–14.5)
EST. GFR  (AFRICAN AMERICAN): >60 ML/MIN/1.73 M^2
EST. GFR  (NON AFRICAN AMERICAN): >60 ML/MIN/1.73 M^2
GLUCOSE SERPL-MCNC: 105 MG/DL (ref 70–110)
HCT VFR BLD AUTO: 29 % (ref 37–48.5)
HGB BLD-MCNC: 9 G/DL (ref 12–16)
IMM GRANULOCYTES # BLD AUTO: 0.22 K/UL (ref 0–0.04)
IMM GRANULOCYTES NFR BLD AUTO: 1.7 % (ref 0–0.5)
LYMPHOCYTES # BLD AUTO: 1.1 K/UL (ref 1–4.8)
LYMPHOCYTES NFR BLD: 7.9 % (ref 18–48)
MAGNESIUM SERPL-MCNC: 1.8 MG/DL (ref 1.6–2.6)
MCH RBC QN AUTO: 28.2 PG (ref 27–31)
MCHC RBC AUTO-ENTMCNC: 31 G/DL (ref 32–36)
MCV RBC AUTO: 91 FL (ref 82–98)
MONOCYTES # BLD AUTO: 1.7 K/UL (ref 0.3–1)
MONOCYTES NFR BLD: 12.8 % (ref 4–15)
NEUTROPHILS # BLD AUTO: 9.9 K/UL (ref 1.8–7.7)
NEUTROPHILS NFR BLD: 74.2 % (ref 38–73)
NRBC BLD-RTO: 0 /100 WBC
PHOSPHATE SERPL-MCNC: 2.6 MG/DL (ref 2.7–4.5)
PLATELET # BLD AUTO: 208 K/UL (ref 150–350)
PMV BLD AUTO: 11.9 FL (ref 9.2–12.9)
POCT GLUCOSE: 104 MG/DL (ref 70–110)
POCT GLUCOSE: 107 MG/DL (ref 70–110)
POCT GLUCOSE: 109 MG/DL (ref 70–110)
POCT GLUCOSE: 109 MG/DL (ref 70–110)
POCT GLUCOSE: 112 MG/DL (ref 70–110)
POCT GLUCOSE: 95 MG/DL (ref 70–110)
POTASSIUM SERPL-SCNC: 3.4 MMOL/L (ref 3.5–5.1)
PROT SERPL-MCNC: 5.7 G/DL (ref 6–8.4)
RBC # BLD AUTO: 3.19 M/UL (ref 4–5.4)
SODIUM SERPL-SCNC: 141 MMOL/L (ref 136–145)
TACROLIMUS BLD-MCNC: 8.5 NG/ML (ref 5–15)
WBC # BLD AUTO: 13.32 K/UL (ref 3.9–12.7)

## 2020-10-01 PROCEDURE — 63600175 PHARM REV CODE 636 W HCPCS: Performed by: SURGERY

## 2020-10-01 PROCEDURE — C9113 INJ PANTOPRAZOLE SODIUM, VIA: HCPCS | Performed by: STUDENT IN AN ORGANIZED HEALTH CARE EDUCATION/TRAINING PROGRAM

## 2020-10-01 PROCEDURE — 99291 PR CRITICAL CARE, E/M 30-74 MINUTES: ICD-10-PCS | Mod: 24,GC,, | Performed by: STUDENT IN AN ORGANIZED HEALTH CARE EDUCATION/TRAINING PROGRAM

## 2020-10-01 PROCEDURE — 94761 N-INVAS EAR/PLS OXIMETRY MLT: CPT

## 2020-10-01 PROCEDURE — 63600175 PHARM REV CODE 636 W HCPCS: Performed by: STUDENT IN AN ORGANIZED HEALTH CARE EDUCATION/TRAINING PROGRAM

## 2020-10-01 PROCEDURE — 63600175 PHARM REV CODE 636 W HCPCS: Performed by: INTERNAL MEDICINE

## 2020-10-01 PROCEDURE — 25000003 PHARM REV CODE 250: Performed by: SURGERY

## 2020-10-01 PROCEDURE — 99233 PR SUBSEQUENT HOSPITAL CARE,LEVL III: ICD-10-PCS | Mod: ,,, | Performed by: INTERNAL MEDICINE

## 2020-10-01 PROCEDURE — 82248 BILIRUBIN DIRECT: CPT

## 2020-10-01 PROCEDURE — 85025 COMPLETE CBC W/AUTO DIFF WBC: CPT

## 2020-10-01 PROCEDURE — 27000221 HC OXYGEN, UP TO 24 HOURS

## 2020-10-01 PROCEDURE — 97164 PT RE-EVAL EST PLAN CARE: CPT

## 2020-10-01 PROCEDURE — 84100 ASSAY OF PHOSPHORUS: CPT

## 2020-10-01 PROCEDURE — B4185 PARENTERAL SOL 10 GM LIPIDS: HCPCS | Performed by: STUDENT IN AN ORGANIZED HEALTH CARE EDUCATION/TRAINING PROGRAM

## 2020-10-01 PROCEDURE — 25000003 PHARM REV CODE 250: Performed by: INTERNAL MEDICINE

## 2020-10-01 PROCEDURE — 20000000 HC ICU ROOM

## 2020-10-01 PROCEDURE — 25000003 PHARM REV CODE 250: Performed by: STUDENT IN AN ORGANIZED HEALTH CARE EDUCATION/TRAINING PROGRAM

## 2020-10-01 PROCEDURE — 97110 THERAPEUTIC EXERCISES: CPT

## 2020-10-01 PROCEDURE — A4216 STERILE WATER/SALINE, 10 ML: HCPCS | Performed by: SURGERY

## 2020-10-01 PROCEDURE — 83735 ASSAY OF MAGNESIUM: CPT

## 2020-10-01 PROCEDURE — 80053 COMPREHEN METABOLIC PANEL: CPT

## 2020-10-01 PROCEDURE — 80197 ASSAY OF TACROLIMUS: CPT

## 2020-10-01 PROCEDURE — A4217 STERILE WATER/SALINE, 500 ML: HCPCS | Performed by: STUDENT IN AN ORGANIZED HEALTH CARE EDUCATION/TRAINING PROGRAM

## 2020-10-01 PROCEDURE — 99291 CRITICAL CARE FIRST HOUR: CPT | Mod: 24,GC,, | Performed by: STUDENT IN AN ORGANIZED HEALTH CARE EDUCATION/TRAINING PROGRAM

## 2020-10-01 PROCEDURE — 97168 OT RE-EVAL EST PLAN CARE: CPT

## 2020-10-01 PROCEDURE — 99233 SBSQ HOSP IP/OBS HIGH 50: CPT | Mod: ,,, | Performed by: INTERNAL MEDICINE

## 2020-10-01 PROCEDURE — 97535 SELF CARE MNGMENT TRAINING: CPT

## 2020-10-01 RX ORDER — FUROSEMIDE 10 MG/ML
20 INJECTION INTRAMUSCULAR; INTRAVENOUS ONCE
Status: COMPLETED | OUTPATIENT
Start: 2020-10-01 | End: 2020-10-01

## 2020-10-01 RX ORDER — LABETALOL HCL 20 MG/4 ML
20 SYRINGE (ML) INTRAVENOUS EVERY 6 HOURS PRN
Status: DISCONTINUED | OUTPATIENT
Start: 2020-10-01 | End: 2020-10-01

## 2020-10-01 RX ORDER — LABETALOL HCL 20 MG/4 ML
10 SYRINGE (ML) INTRAVENOUS EVERY 4 HOURS PRN
Status: DISCONTINUED | OUTPATIENT
Start: 2020-10-01 | End: 2020-10-06

## 2020-10-01 RX ORDER — MEROPENEM AND SODIUM CHLORIDE 1 G/50ML
1 INJECTION, SOLUTION INTRAVENOUS
Status: DISCONTINUED | OUTPATIENT
Start: 2020-10-01 | End: 2020-10-12

## 2020-10-01 RX ORDER — ACETAMINOPHEN 650 MG/1
650 SUPPOSITORY RECTAL EVERY 6 HOURS PRN
Status: DISCONTINUED | OUTPATIENT
Start: 2020-10-01 | End: 2020-10-02

## 2020-10-01 RX ADMIN — Medication 10 ML: at 12:10

## 2020-10-01 RX ADMIN — SODIUM PHOSPHATE, MONOBASIC, MONOHYDRATE 15 MMOL: 276; 142 INJECTION, SOLUTION INTRAVENOUS at 05:10

## 2020-10-01 RX ADMIN — LEVOTHYROXINE SODIUM ANHYDROUS 40 MCG: 100 INJECTION, POWDER, LYOPHILIZED, FOR SOLUTION INTRAVENOUS at 08:10

## 2020-10-01 RX ADMIN — SMOFLIPID 250 ML: 6; 6; 5; 3 INJECTION, EMULSION INTRAVENOUS at 09:10

## 2020-10-01 RX ADMIN — Medication 10 MG: at 08:10

## 2020-10-01 RX ADMIN — Medication 10 ML: at 05:10

## 2020-10-01 RX ADMIN — PANTOPRAZOLE SODIUM 40 MG: 40 INJECTION, POWDER, LYOPHILIZED, FOR SOLUTION INTRAVENOUS at 08:10

## 2020-10-01 RX ADMIN — HEPARIN SODIUM 5000 UNITS: 5000 INJECTION INTRAVENOUS; SUBCUTANEOUS at 09:10

## 2020-10-01 RX ADMIN — HEPARIN SODIUM 5000 UNITS: 5000 INJECTION INTRAVENOUS; SUBCUTANEOUS at 05:10

## 2020-10-01 RX ADMIN — TACROLIMUS 1 MG: 1 CAPSULE ORAL at 08:10

## 2020-10-01 RX ADMIN — Medication 10 ML: at 06:10

## 2020-10-01 RX ADMIN — ACETAMINOPHEN 650 MG: 650 SUPPOSITORY RECTAL at 11:10

## 2020-10-01 RX ADMIN — HEPARIN SODIUM 5000 UNITS: 5000 INJECTION INTRAVENOUS; SUBCUTANEOUS at 02:10

## 2020-10-01 RX ADMIN — HYDROMORPHONE HYDROCHLORIDE 0.5 MG: 1 INJECTION, SOLUTION INTRAMUSCULAR; INTRAVENOUS; SUBCUTANEOUS at 03:10

## 2020-10-01 RX ADMIN — MEROPENEM AND SODIUM CHLORIDE 1 G: 1 INJECTION, SOLUTION INTRAVENOUS at 10:10

## 2020-10-01 RX ADMIN — FLUCONAZOLE 400 MG: 2 INJECTION, SOLUTION INTRAVENOUS at 03:10

## 2020-10-01 RX ADMIN — MEROPENEM AND SODIUM CHLORIDE 1 G: 1 INJECTION, SOLUTION INTRAVENOUS at 03:10

## 2020-10-01 RX ADMIN — MEROPENEM 2 G: 1 INJECTION, POWDER, FOR SOLUTION INTRAVENOUS at 05:10

## 2020-10-01 RX ADMIN — FUROSEMIDE 20 MG: 10 INJECTION, SOLUTION INTRAMUSCULAR; INTRAVENOUS at 08:10

## 2020-10-01 RX ADMIN — MAGNESIUM SULFATE HEPTAHYDRATE: 500 INJECTION, SOLUTION INTRAMUSCULAR; INTRAVENOUS at 09:10

## 2020-10-01 RX ADMIN — POTASSIUM CHLORIDE 60 MEQ: 14.9 INJECTION, SOLUTION INTRAVENOUS at 07:10

## 2020-10-01 RX ADMIN — HYDROMORPHONE HYDROCHLORIDE 0.5 MG: 1 INJECTION, SOLUTION INTRAMUSCULAR; INTRAVENOUS; SUBCUTANEOUS at 02:10

## 2020-10-01 NOTE — PROGRESS NOTES
[facility]  General Surgery  Progress Note    Subjective:     History of Present Illness:  Ms. Hernandez is a 51yo F w/PMH of von Gierke disease s/p liver transplant (2002, on tacro + MMF, follows Dr. Nielsen), hx chronic rejection (bx 2015 with acute and chronic rejection s/p steroids), biliary stricture and ductopenic rejection, recently with cirrhosis on fibroscan (10/2019), HTN, and depression who presents as a transfer for further evaluation of gastric outlet obstruction and esophageal stricturing.  Patient decompensated requiring multiple pressors and intubation. CT scan showed free air. Prior to intubation patient reported severe abdominal pain.   states that patient has had epigastric pain, nausea, and vomiting for several days.     Post-Op Info:  Procedure(s) (LRB):  LAPAROTOMY, EXPLORATORY, DRAINAGE OF ABSCESS, REPAIR OF GASTRIC PERFORATION, GASTROSTOMY TUBE PLACEMENT (N/A)  APPLICATION, WOUND VAC (N/A)   13 Days Post-Op     Interval History: No acute events overnight. Goals of care discussion today with her and her .    Medications:  Continuous Infusions:   TPN ADULT CENTRAL LINE CUSTOM 60 mL/hr at 10/01/20 1100    TPN ADULT CENTRAL LINE CUSTOM       Scheduled Meds:   bisacodyL  10 mg Rectal Daily    fluconazole (DIFLUCAN) IVPB  400 mg Intravenous Q24H    heparin (porcine)  5,000 Units Subcutaneous Q8H    levothyroxine  40 mcg Intravenous Daily    lipid (SMOFLIPID)  250 mL Intravenous Daily    meropenem (MERREM) IVPB  1 g Intravenous Q8H    pantoprazole  40 mg Intravenous Daily    sodium chloride 0.9%  10 mL Intravenous Q6H     PRN Meds:acetaminophen, albuterol-ipratropium, calcium gluconate IVPB, calcium gluconate IVPB, calcium gluconate IVPB, dextrose 50%, dextrose 50%, glucagon (human recombinant), glucose, glucose, HYDROmorphone, insulin aspart U-100, labetaloL, magnesium sulfate IVPB, magnesium sulfate IVPB, ondansetron, potassium chloride in water **AND**  potassium chloride in water **AND** potassium chloride in water, promethazine, sodium chloride 0.9%, Flushing PICC Protocol **AND** sodium chloride 0.9% **AND** sodium chloride 0.9%, sodium phosphate IVPB, sodium phosphate IVPB, sodium phosphate IVPB     Review of patient's allergies indicates:   Allergen Reactions    Codeine Itching     Other reaction(s): Itching    Lipitor [atorvastatin] Other (See Comments)     Other reaction(s): Muscle pain  Muscle cranmps    Morphine Itching     Other reaction(s): nausea and vomiting     Zoloft [sertraline] Other (See Comments)     Tremors/muscle spasms     Objective:     Vital Signs (Most Recent):  Temp: (!) 100.7 °F (38.2 °C) (10/01/20 1100)  Pulse: 104 (10/01/20 1115)  Resp: (!) 33 (10/01/20 1115)  BP: (!) 167/78 (10/01/20 1100)  SpO2: (!) 94 % (10/01/20 1115) Vital Signs (24h Range):  Temp:  [98.4 °F (36.9 °C)-100.7 °F (38.2 °C)] 100.7 °F (38.2 °C)  Pulse:  [] 104  Resp:  [18-37] 33  SpO2:  [89 %-98 %] 94 %  BP: (124-182)/() 167/78     Weight: 67.8 kg (149 lb 7.6 oz)  Body mass index is 30.19 kg/m².    Intake/Output - Last 3 Shifts       09/29 0700 - 09/30 0659 09/30 0700 - 10/01 0659 10/01 0700 - 10/02 0659    I.V. (mL/kg) 3015.6 (44.5) 438 (6.5)      1592     Total Intake(mL/kg) 3599.6 (53.1) 2030 (29.9)     Urine (mL/kg/hr) 3060 (1.9) 2800 (1.7) 1010 (3.4)    Drains 294 204 90    Other 150 10 100    Stool 0 0 0    Total Output 3504 3014 1200    Net +95.6 -984 -1200           Stool Occurrence 1 x 1 x 1 x          Physical Exam  Vitals signs and nursing note reviewed.   Constitutional:       Appearance: She is ill-appearing.   HENT:      Head: Normocephalic and atraumatic.      Nose:      Comments: NG tube in place     Mouth/Throat:      Mouth: Mucous membranes are dry.   Eyes:      General: No scleral icterus.  Neck:      Comments: RIJ central line  Cardiovascular:      Rate and Rhythm: Normal rate and regular rhythm.      Heart sounds: Normal heart  sounds.   Pulmonary:      Breath sounds: Rhonchi: bilateral.   Abdominal:      General: There is distension.      Palpations: Abdomen is soft.      Comments: Midline incision with wound vac in place; excellent seal to wound vac.   L abd drains in place with mild serous output; G tube to gravity.   Genitourinary:     Comments: L groin a line removed, now with drainage from insertion site  Bhardwaj in place  Skin:     General: Skin is warm and dry.      Coloration: Skin is not jaundiced.   Neurological:      Mental Status: She is alert.      Cranial Nerves: No cranial nerve deficit.      Sensory: No sensory deficit.      Motor: No weakness.         Significant Labs:  CBC:   Recent Labs   Lab 10/01/20  0310   WBC 13.32*   RBC 3.19*   HGB 9.0*   HCT 29.0*      MCV 91   MCH 28.2   MCHC 31.0*     CMP:   Recent Labs   Lab 10/01/20  0310      CALCIUM 8.6*   ALBUMIN 2.1*   PROT 5.7*      K 3.4*   CO2 22*      BUN 26*   CREATININE 0.6   ALKPHOS 309*   ALT 16   AST 32   BILITOT 7.0*       Significant Diagnostics:  I have reviewed all pertinent imaging results/findings within the past 24 hours.    Assessment/Plan:     * Perforated abdominal viscus  50 y.o. female w/ free air on CT scan. S/p emergent ex lap on 9/4 w/ findings of gastric perforation, primary repair. Now w/  abdominal closure on 9/6, skin stapled closed, KERRY drains removed. Class A to OR on 9/18 for free air and extrav or oral contrast seen on CT. CT on 9/25 redemonstrated contrast leaking from stomach, but well-drained with surgical drain.    Continue SICU care, appreciate their assistance  No operative intervention for gastric leaking at this time. Leak controlled with elian drain. Tissue is so friable that repeat operative intervention around the G tube will not provide a long-term solution.   G tube and NG to gravity/suction  Wound vac changes w/ wound care  Recommend ongoing goals of care discussion with the patient's  -  DNR        Rocio Hidalgo MD  General Surgery  [facility]

## 2020-10-01 NOTE — SUBJECTIVE & OBJECTIVE
Interval History/Significant Events:   FRANK.  AAO x3. To have goals of care discussion today with patient and . Pain under control.     Follow-up For: Procedure(s) (LRB):  LAPAROTOMY, EXPLORATORY, DRAINAGE OF ABSCESS, REPAIR OF GASTRIC PERFORATION, GASTROSTOMY TUBE PLACEMENT (N/A)  APPLICATION, WOUND VAC (N/A)    Post-Operative Day: 12 Days Post-Op    Objective:     Vital Signs (Most Recent):  Temp: 99.7 °F (37.6 °C) (10/01/20 0700)  Pulse: (!) 120 (10/01/20 0800)  Resp: (!) 30 (10/01/20 0800)  BP: (!) 180/93 (10/01/20 0800)  SpO2: (!) 91 % (10/01/20 0800) Vital Signs (24h Range):  Temp:  [98.4 °F (36.9 °C)-99.7 °F (37.6 °C)] 99.7 °F (37.6 °C)  Pulse:  [106-126] 120  Resp:  [18-35] 30  SpO2:  [89 %-98 %] 91 %  BP: (124-180)/() 180/93     Weight: 67.8 kg (149 lb 7.6 oz)  Body mass index is 30.19 kg/m².      Intake/Output Summary (Last 24 hours) at 10/1/2020 0824  Last data filed at 10/1/2020 0800  Gross per 24 hour   Intake 2030 ml   Output 3014 ml   Net -984 ml       Physical Exam  Vitals signs and nursing note reviewed.   Constitutional:       Appearance: She is ill-appearing.   HENT:      Head: Normocephalic and atraumatic.      Nose:      Comments: NG tube in place     Mouth/Throat:      Mouth: Mucous membranes are dry.      Comments: Endotracheal tube in place  Eyes:      General: No scleral icterus.     Extraocular Movements: Extraocular movements intact.   Neck:      Musculoskeletal: Normal range of motion.      Comments: RIJ central line  Cardiovascular:      Rate and Rhythm: Normal rate and regular rhythm.      Heart sounds: Normal heart sounds.   Pulmonary:      Breath sounds: Rhonchi: bilateral.      Comments: 5L NC  Abdominal:      General: There is distension.      Palpations: Abdomen is soft.      Comments: Midline incision with wound vac in place; excellent seal to wound vac.   L abd drains in place with mild serous output; G tube to gravity.   Genitourinary:     Comments: Bhardwaj in  place  Musculoskeletal:      Comments: LUE weakness noted.   Skin:     General: Skin is warm and dry.      Coloration: Skin is not jaundiced.   Neurological:      Mental Status: She is alert and oriented to person, place, and time.      Cranial Nerves: No cranial nerve deficit.      Sensory: No sensory deficit.      Comments: Sedated         Vents:  Vent Mode: Spont (09/29/20 1135)  Ventilator Initiated: Yes(chart correction) (09/25/20 1221)  Set Rate: 18 BPM (09/29/20 0722)  Vt Set: 400 mL (09/29/20 0722)  Pressure Support: 14 cmH20 (09/29/20 1135)  PEEP/CPAP: 8 cmH20 (09/29/20 1135)  Oxygen Concentration (%): 50 (09/29/20 1515)  Peak Airway Pressure: 13 cmH2O (09/29/20 1135)  Plateau Pressure: 23 cmH20 (09/29/20 0722)  Total Ve: 9.18 mL (09/29/20 1135)  Negative Inspiratory Force (cm H2O): -34 (09/29/20 1614)  F/VT Ratio<105 (RSBI): (!) 43.8 (09/29/20 0327)    Lines/Drains/Airways     Peripherally Inserted Central Catheter Line            PICC Double Lumen 09/30/20 1144 right basilic less than 1 day          Drain                 Urethral Catheter 09/14/20 1330 16 days         Closed/Suction Drain 09/18/20 1555 Left Abdomen Bulb 19 Fr. 12 days         Closed/Suction Drain 09/18/20 1559 Left Abdomen Bulb 19 Fr. 12 days         Gastrostomy/Enterostomy 09/18/20 1612 Other (see comments) LUQ decompression;feeding 12 days          Peripheral Intravenous Line                 Peripheral IV - Single Lumen 09/29/20 1051 22 G Right Hand 1 day                Significant Labs:    CBC/Anemia Profile:  Recent Labs   Lab 09/30/20  0259 10/01/20  0310   WBC 14.35* 13.32*   HGB 8.9* 9.0*   HCT 29.1* 29.0*    208   MCV 92 91   RDW 17.6* 17.3*        Chemistries:  Recent Labs   Lab 09/30/20  0259 10/01/20  0310    141   K 3.5 3.4*    109   CO2 21* 22*   BUN 28* 26*   CREATININE 0.6 0.6   CALCIUM 8.7 8.6*   ALBUMIN 2.4* 2.1*   PROT 5.6* 5.7*   BILITOT 6.6* 7.0*   ALKPHOS 284* 309*   ALT 13 16   AST 29 32   MG 1.9  1.8   PHOS 2.3* 2.6*       All pertinent labs within the past 24 hours have been reviewed.    Significant Imaging:  I have reviewed all pertinent imaging results/findings within the past 24 hours.

## 2020-10-01 NOTE — PROGRESS NOTES
Pt's oral temp 100.7F. Notified SENDY ARTHUR. Orders for 650mg tylenol suppository. VSS. Will continue to monitor.

## 2020-10-01 NOTE — PLAN OF CARE
"      SICU PLAN OF CARE NOTE    Dx: Perforated abdominal viscus    Shift Events: PICC line place in right arm; Right IJ triple lumen removed    Goals of Care: SBP <180, MAP >65    Neuro: AAO x4, Follows Commands and Moves All Extremities    Vital Signs: BP (!) 171/76 (BP Location: Left arm, Patient Position: Lying)   Pulse (!) 112   Temp 98.4 °F (36.9 °C) (Oral)   Resp (!) 31   Ht 4' 11" (1.499 m)   Wt 67.8 kg (149 lb 7.6 oz)   SpO2 (!) 93%   BMI 30.19 kg/m²     Respiratory: 5L NC    Diet: NPO and TPN    Gtts: Abx    Urine Output: Urinary Catheter 125 cc/hour    LDAs: Urinary catheter, 2 Left KERRY drains to bulb suction, gastrostomy tube, wound vac, Right radial PIV, Right arm PICC     Labs/Accuchecks: Accu q4h    Skin: Blanchable reddness noted on sacrum, foam applied; healing partial thickness wound noted on left upper leg open to air; puncture wound noted on left upper leg with collection bad for drainage; midline abdominal incision with staples GABY and wound vac.       "

## 2020-10-01 NOTE — ASSESSMENT & PLAN NOTE
Neuro:  - sedation: none  - analgesia: Tylenol suppository. Dilauded PRN  - Stroke symptoms 9/25. Improved currently.  CT head obtained. Vascular neurology consulted.     CV:   - Hypotensive requiring fluid resuscitation and low-dose pressors intermittently  - Currently off pressors  - IV labetalol PRN  - femoral a-line placed 9/26 was removed due to dysfunction   - Strict monitoring of BP w/ cuff    Pulm:   - Extubated 9/22.  Reintubated 9/25.  Extubated 9/29.    - 5L NC; titrate supplemental O2 to maintain sats >92%  - CPT   - Duonebs Q6 PRN     FEN/GI: Perforated stomach; s/p washout and patch (9/4); abdominal closure (9/6)  - s/p ex-lap on 9/19   - continue to hold tube feeds at this time  - G tube, and L abdominal drains in place.  - G tube upsized  - Lactulose discontinued (9/14) in setting of normal ammonia level (9/10) and improving mental status  - Daily metabolic panel with PRN repletion of electrolytes  - pantoprazole for ulcer ppx  - daily suppository  - Cont TPN  - alk phos, Tbili elevated; pt w/ remote history of liver transplant.  CTM     Renal:  - Continue to monitor with daily metabolic labs  - nephrology on board, appreciate recs  - Bun/Cr 28/0.6 today; continues to improve  - Bhardwaj in place; monitoring I/Os  - Lasix today     HEME/ID:   - Afebrile  - H/H stable.  - Daily CBC  - s/p Liver transplant in 2002; Daily tacrolimus level with AM labs. Per hepatology, continue tacrolimus 1mg BID  - heparin for DVT ppx  - Abdominal culture grew Klebsiella pneumonia; sensitive to zosyn, & Candida parapsilosis.  - ABX regimen meropenum, and diflucan, will re-consult ID for recommendations on continuing. Will continue current regimen pending imaging.  - CT A/P today to evaluate for possible collections    Endo:  - Synthroid 40mcg daily  - insulin aspart for blood glucose control    Dispo:  - Continue SICU care, goals of care discussion today

## 2020-10-01 NOTE — PLAN OF CARE
Recommendations     1.) Change TPN to 85gm of AA, 165gm of dextrose, 50gm of lipids. TPN provides 1401kcal.   2.) Once medically appropriate advance diet as tolerated to low fiber per SLP texture recommendations.      Goals: 1. Pt's intake to meet % EEN and EPN by RD follow up.  Nutrition Goal Status: progressing towards goal  Communication of RD Recs: (POC)

## 2020-10-01 NOTE — PT/OT/SLP RE-EVAL
Physical Therapy Re-evaluation and Treatment     Patient Name:  Elda Hernandez   MRN:  [id number]    *co-treatment with OT   Recommendations:     Discharge Recommendations:  nursing facility, skilled   Discharge Equipment Recommendations: (TBD)   Barriers to discharge: Decreased caregiver support    Assessment:     Elda Hernandez is a 50 y.o. female admitted with a medical diagnosis of Perforated abdominal viscus.  She presents with the following impairments/functional limitations:  weakness, impaired endurance, impaired self care skills, gait instability, impaired functional mobilty, impaired balance, impaired cardiopulmonary response to activity. Pt tolerated activity with increased alertness and participation compared to previous sessions. Pt able to sit EOB with CGA-minimum assistance for static sitting balance. Pt returned to bed 2/2 fatigue and increasing hypertension. Upon discharge, pt would benefit from continued skilled therapy intervention at [facility] nursing facility to progress toward more independent mobility. At this time, pt would continue to benefit from acute skilled therapy intervention to address deficits and progress toward prior level of function.       Rehab Prognosis:  good; patient would benefit from acute skilled PT services to address these deficits and reach maximum level of function.      Recent Surgery: Procedure(s) (LRB):  LAPAROTOMY, EXPLORATORY, DRAINAGE OF ABSCESS, REPAIR OF GASTRIC PERFORATION, GASTROSTOMY TUBE PLACEMENT (N/A)  APPLICATION, WOUND VAC (N/A) 13 Days Post-Op    Plan:     During this hospitalization, patient to be seen 3 x/week to address the above listed problems via therapeutic activities, therapeutic exercises, neuromuscular re-education, gait training  · Plan of Care Expires:  11/01/20   Plan of Care Reviewed with: patient, spouse    Subjective     Communicated with RN prior to session.  Patient found HOB elevated with blood pressure cuff, peripheral IV, pulse  ox (continuous), telemetry, mei catheter, oxygen(abdominal drain) upon PT entry to room, agreeable to evaluation.      Chief Complaint: Pt c/o feeling dizzy while sitting EOB  Patient comments/goals: to get better and return home   Pain/Comfort:  · Pain Rating 1: 0/10  · Pain Rating Post-Intervention 1: 0/10    Patients cultural, spiritual, Hindu conflicts given the current situation: no      Objective:     Patient found with: blood pressure cuff, peripheral IV, pulse ox (continuous), telemetry, mei catheter, oxygen(abdominal drain)     General Precautions: Standard, fall   Orthopedic Precautions:N/A   Braces: N/A     Exams:  · Cognitive Exam:  Patient is AAOx4, followed all commands, communicates clearly and fluently  · Gross Motor Coordination:  WFL  · RLE ROM: WFL  · RLE Strength: no active movement noted   · LLE ROM: WFL  · LLE Strength: no active movement noted     Functional Mobility:  · Bed Mobility:     · Supine to Sit: maximal assistance of 2 persons  · Sit to Supine: total assistance of 2 persons     AM-PAC 6 CLICK MOBILITY  Total Score:8       Therapeutic Activities and Exercises:   Pt sat EOB for ~8 mins with contact guard assistance-minimum assistance for static sitting balance. Pt with excessive posterior lean, improved with pt pulling with assistance from PT to perform anterior weight shift toward more neutral position.  Session focused on strength and stabilization of trunk musculature to increase tolerance to sitting balance. Pt reported fatigue and demo'd increasing hypertension, returned to supine.   Pt educated on role of PT/POC. Pt verbalized understanding.   Pt encouraged to only perform OOB mobility with assistance from nursing/therapy. Pt agreeable.   Pt with increased joint restriction in B ankles with excessive plantarflexion. Pt reported PRAFO has not been used since ordered. PT reviewed donning of PRAFO And alternating between LE every 2 hours. RN in agreement.       Patient left  HOB elevated with all lines intact, call button in reach and RN  notified.    GOALS:   Multidisciplinary Problems     Physical Therapy Goals        Problem: Physical Therapy Goal    Goal Priority Disciplines Outcome Goal Variances Interventions   Physical Therapy Goal     PT, PT/OT Ongoing, Progressing     Description: Goals to be met by: 10/15/2020     Patient will increase functional independence with mobility by performin. Supine to sit with Moderate Assistance  2. Rolling to Left and Right with Moderate Assistance.  3. Sit to stand transfer with Moderate Assistance  4. Sitting at edge of bed x10 minutes with Stand-by Assistance  5. Lower extremity exercise program x10 reps per handout, with assistance as needed                       History:     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    Arnold-Chiari malformation     Depression     Esophageal stricture     Essential tremor     Hypertension     Left bundle branch block     Liver fibrosis, transplanted liver 10/2/2018    Suggested on fibroscan 10/2/18    Migraine without aura     MVP (mitral valve prolapse)     Non-rheumatic mitral regurgitation 10/1/2018    Non-rheumatic tricuspid valve insufficiency 10/1/2018    Osteoporosis     Recurrent urinary tract infection     Seizures     Shingles     SIADH (syndrome of inappropriate ADH production)     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    Tricuspid valve prolapse     Urolithiasis     Von Gierke disease     s/p liver transplant       Past Surgical History:   Procedure Laterality Date    APPENDECTOMY  2007    APPLICATION OF WOUND VACUUM-ASSISTED CLOSURE DEVICE N/A 2020    Procedure: APPLICATION, WOUND VAC;  Surgeon: Zain Decker MD;  Location: [facility] OR [address] [place] [address];  Service: General;  Laterality: N/A;    COLONOSCOPY  2008    internal hemorrhoids    CRANIOTOMY      ESOPHAGOGASTRODUODENOSCOPY N/A 9/3/2020    Procedure: EGD  (ESOPHAGOGASTRODUODENOSCOPY);  Surgeon: Tyrel Vergara MD;  [facility];  Service: Endoscopy;  Laterality: N/A;    LAMINECTOMY  3/2001    LIVER TRANSPLANT  9/23/2002    OSSICULAR RECONSTRUCTION  10/4/1995    RIGHT REPLACEMENT PROSTHESIS for cholesteatoma    THYMECTOMY  5/2/2007    TONSILLECTOMY, ADENOIDECTOMY  1/21/2004    TOTAL ABDOMINAL HYSTERECTOMY  3/31/1994       Time Tracking:     PT Received On: 10/01/20  PT Start Time: 0920     PT Stop Time: 0938  PT Total Time (min): 18 min     Billable Minutes: Re-eval 8 mins  and Therapeutic Exercise 10 mins       Criss Lange, PT  10/01/2020

## 2020-10-01 NOTE — PLAN OF CARE
SW is following this Pt for DC planning needs. Discharge Disposition: SNF - pending goals of care discussion    SW will continue to coordinate with patient, family, team and insurance to complete patient's discharge plan.    Tigist Moody LMSW   - Case Management

## 2020-10-01 NOTE — PROGRESS NOTES
Patient seen for wound care follow up for wound vac dressing change to midline abdomen. Wound vac dressing changed. Cleansed with sterile normal saline, cavilon skin barrier to periwound, packed with one black sponge, seal obtained at 125mmHg. Patient tolerated care well. Wound care to change wound vac twice a week. Wound care to follow prn.      Upper midline abdomen: Full thickness skin loss, moist red/pink granular tissue, scant fibrinous tissue, no drainage or odor. Staples noted to lower midline abdominal incision. Abdominal distention noted. 10cm x 3cm x 1cm

## 2020-10-01 NOTE — PLAN OF CARE
Problem: Physical Therapy Goal  Goal: Physical Therapy Goal  Description: Goals to be met by: 10/15/2020     Patient will increase functional independence with mobility by performin. Supine to sit with Moderate Assistance  2. Rolling to Left and Right with Moderate Assistance.  3. Sit to stand transfer with Moderate Assistance  4. Sitting at edge of bed x10 minutes with Stand-by Assistance  5. Lower extremity exercise program x10 reps per handout, with assistance as needed      Outcome: Ongoing, Progressing     Pt re-evaluated and appropriate goals established.     Criss Lange, PT, DPT  10/1/2020  [phone]

## 2020-10-01 NOTE — PLAN OF CARE
Pt on 5L NC, O2 sats >90%. HR 90-110s ST. SBP maintained <180 with labetalol IVP prn. Tmax 100.7F, tylenol prn suppository given. TPN @ 60cc/h. KERRY 1 with 5cc and KERRY 2 with 15cc, serous/milky output. Gtube to gravity with 125cc brown/purulent output. Woundvac with 100cc output. UOP 1.5L per mei catheter. Pt sat to side of bed with PT/OT. Woundvac dsg changed per woundcare. Lytes replaced per order. Plan of care reviewed with pt and spouse. VSS at this time. Will continue to monitor. See flowsheet for full assessment details.    Skin: Pt on immerse mattress. Heel foams on and sacral foam on. Pt turned Q2h. Woundcare done per order.

## 2020-10-01 NOTE — SUBJECTIVE & OBJECTIVE
Interval History: called back for abx recommendations. Pt now extubated, a&o, denies significant abd pain    Review of Systems   Constitutional: Positive for fever. Negative for activity change, appetite change, chills and unexpected weight change.   HENT: Negative for dental problem, ear discharge, ear pain, mouth sores, sinus pain, sore throat and trouble swallowing.    Eyes: Negative for pain and discharge.   Respiratory: Negative for cough, chest tightness, shortness of breath and wheezing.    Cardiovascular: Negative for chest pain and leg swelling.   Gastrointestinal: Positive for abdominal pain. Negative for abdominal distention, constipation, diarrhea, nausea and vomiting.   Genitourinary: Negative for difficulty urinating, dysuria, flank pain, frequency, genital sores and hematuria.   Musculoskeletal: Negative for arthralgias, joint swelling, neck pain and neck stiffness.   Skin: Negative for color change, rash and wound.   Neurological: Negative for dizziness, weakness, light-headedness, numbness and headaches.   Psychiatric/Behavioral: Negative for confusion. The patient is not nervous/anxious.      Objective:     Vital Signs (Most Recent):  Temp: 100.3 °F (37.9 °C) (10/01/20 1600)  Pulse: 103 (10/01/20 1700)  Resp: 16 (10/01/20 1700)  BP: (!) 157/73 (10/01/20 1700)  SpO2: 95 % (10/01/20 1700) Vital Signs (24h Range):  Temp:  [99 °F (37.2 °C)-100.7 °F (38.2 °C)] 100.3 °F (37.9 °C)  Pulse:  [] 103  Resp:  [13-37] 16  SpO2:  [90 %-98 %] 95 %  BP: (141-182)/() 157/73     Weight: 67.8 kg (149 lb 7.6 oz)  Body mass index is 30.19 kg/m².    Estimated Creatinine Clearance: 117.4 mL/min (based on SCr of 0.6 mg/dL).    Physical Exam  Vitals signs reviewed.   Constitutional:       General: She is not in acute distress.     Appearance: She is well-developed. She is ill-appearing. She is not diaphoretic.   HENT:      Head: Atraumatic.      Right Ear: External ear normal.      Left Ear: External ear  normal.      Nose: Nose normal. No congestion or rhinorrhea.      Mouth/Throat:      Mouth: Mucous membranes are moist.      Pharynx: Oropharynx is clear. No oropharyngeal exudate or posterior oropharyngeal erythema.   Eyes:      General: No scleral icterus.        Right eye: No discharge.         Left eye: No discharge.      Extraocular Movements: Extraocular movements intact.      Conjunctiva/sclera: Conjunctivae normal.   Neck:      Musculoskeletal: Normal range of motion and neck supple.   Cardiovascular:      Rate and Rhythm: Normal rate and regular rhythm.      Pulses: Normal pulses.      Heart sounds: Normal heart sounds. No murmur.   Pulmonary:      Effort: Pulmonary effort is normal. No respiratory distress.      Breath sounds: Normal breath sounds. No wheezing.   Abdominal:      Comments: Mild distention, wound vac in place to midline incision with good seal, L sided abd drains in place w/ serous output, G-tube to gravity w/ cloudy serous output. L groin collection bag at site of prior A line, serous drainage present. Minimal tenderness to palpation.    Musculoskeletal: Normal range of motion.         General: No swelling or deformity.      Right lower leg: Edema present.      Left lower leg: Edema present.      Comments: R arm PICC line   Skin:     General: Skin is warm and dry.      Findings: No erythema or rash.   Neurological:      General: No focal deficit present.      Mental Status: She is alert and oriented to person, place, and time. Mental status is at baseline.      Cranial Nerves: No cranial nerve deficit.   Psychiatric:         Mood and Affect: Mood normal.         Behavior: Behavior normal.         Thought Content: Thought content normal.         Judgment: Judgment normal.         Significant Labs:   CBC:   Recent Labs   Lab 09/30/20  0259 10/01/20  0310   WBC 14.35* 13.32*   HGB 8.9* 9.0*   HCT 29.1* 29.0*    208     CMP:   Recent Labs   Lab 09/30/20  0259 10/01/20  0310    141    K 3.5 3.4*    109   CO2 21* 22*   GLU 95 105   BUN 28* 26*   CREATININE 0.6 0.6   CALCIUM 8.7 8.6*   PROT 5.6* 5.7*   ALBUMIN 2.4* 2.1*   BILITOT 6.6* 7.0*   ALKPHOS 284* 309*   AST 29 32   ALT 13 16   ANIONGAP 10 10   EGFRNONAA >60.0 >60.0     Microbiology Results (last 7 days)     Procedure Component Value Units Date/Time    Fungus culture [[id number]]  (Abnormal) Collected: 09/18/20 1544    Order Status: Completed Specimen: Abscess from Abdomen Updated: 09/29/20 0652     Fungus (Mycology) Culture CANDIDA PARAPSILOSIS  Rare      Narrative:      1. Inter-Abdominal Abscess for cultures  2. Inter-Abdominal Abscess for cultures    Culture, VAP (BAL) [[id number]] Collected: 09/25/20 1238    Order Status: Completed Specimen: Bronchial Alveolar Lavage from BAL, LLL Updated: 09/28/20 0831     VAP BAL CULTURE Normal respiratory ted     Gram Stain (Respiratory) <10 epithelial cells per low power field.     Gram Stain (Respiratory) Rare WBC's     Gram Stain (Respiratory) No organisms seen          Significant Imaging: I have reviewed all pertinent imaging results/findings within the past 24 hours.

## 2020-10-01 NOTE — SUBJECTIVE & OBJECTIVE
Interval History: No acute events overnight. Goals of care discussion today with her and her .    Medications:  Continuous Infusions:   TPN ADULT CENTRAL LINE CUSTOM 60 mL/hr at 10/01/20 1100    TPN ADULT CENTRAL LINE CUSTOM       Scheduled Meds:   bisacodyL  10 mg Rectal Daily    fluconazole (DIFLUCAN) IVPB  400 mg Intravenous Q24H    heparin (porcine)  5,000 Units Subcutaneous Q8H    levothyroxine  40 mcg Intravenous Daily    lipid (SMOFLIPID)  250 mL Intravenous Daily    meropenem (MERREM) IVPB  1 g Intravenous Q8H    pantoprazole  40 mg Intravenous Daily    sodium chloride 0.9%  10 mL Intravenous Q6H     PRN Meds:acetaminophen, albuterol-ipratropium, calcium gluconate IVPB, calcium gluconate IVPB, calcium gluconate IVPB, dextrose 50%, dextrose 50%, glucagon (human recombinant), glucose, glucose, HYDROmorphone, insulin aspart U-100, labetaloL, magnesium sulfate IVPB, magnesium sulfate IVPB, ondansetron, potassium chloride in water **AND** potassium chloride in water **AND** potassium chloride in water, promethazine, sodium chloride 0.9%, Flushing PICC Protocol **AND** sodium chloride 0.9% **AND** sodium chloride 0.9%, sodium phosphate IVPB, sodium phosphate IVPB, sodium phosphate IVPB     Review of patient's allergies indicates:   Allergen Reactions    Codeine Itching     Other reaction(s): Itching    Lipitor [atorvastatin] Other (See Comments)     Other reaction(s): Muscle pain  Muscle cranmps    Morphine Itching     Other reaction(s): nausea and vomiting     Zoloft [sertraline] Other (See Comments)     Tremors/muscle spasms     Objective:     Vital Signs (Most Recent):  Temp: (!) 100.7 °F (38.2 °C) (10/01/20 1100)  Pulse: 104 (10/01/20 1115)  Resp: (!) 33 (10/01/20 1115)  BP: (!) 167/78 (10/01/20 1100)  SpO2: (!) 94 % (10/01/20 1115) Vital Signs (24h Range):  Temp:  [98.4 °F (36.9 °C)-100.7 °F (38.2 °C)] 100.7 °F (38.2 °C)  Pulse:  [] 104  Resp:  [18-37] 33  SpO2:  [89 %-98 %] 94  %  BP: (124-182)/() 167/78     Weight: 67.8 kg (149 lb 7.6 oz)  Body mass index is 30.19 kg/m².    Intake/Output - Last 3 Shifts       09/29 0700 - 09/30 0659 09/30 0700 - 10/01 0659 10/01 0700 - 10/02 0659    I.V. (mL/kg) 3015.6 (44.5) 438 (6.5)      1592     Total Intake(mL/kg) 3599.6 (53.1) 2030 (29.9)     Urine (mL/kg/hr) 3060 (1.9) 2800 (1.7) 1010 (3.4)    Drains 294 204 90    Other 150 10 100    Stool 0 0 0    Total Output 3504 3014 1200    Net +95.6 -984 -1200           Stool Occurrence 1 x 1 x 1 x          Physical Exam  Vitals signs and nursing note reviewed.   Constitutional:       Appearance: She is ill-appearing.   HENT:      Head: Normocephalic and atraumatic.      Nose:      Comments: NG tube in place     Mouth/Throat:      Mouth: Mucous membranes are dry.   Eyes:      General: No scleral icterus.  Neck:      Comments: RIJ central line  Cardiovascular:      Rate and Rhythm: Normal rate and regular rhythm.      Heart sounds: Normal heart sounds.   Pulmonary:      Breath sounds: Rhonchi: bilateral.   Abdominal:      General: There is distension.      Palpations: Abdomen is soft.      Comments: Midline incision with wound vac in place; excellent seal to wound vac.   L abd drains in place with mild serous output; G tube to gravity.   Genitourinary:     Comments: L groin a line removed, now with drainage from insertion site  Bhardwaj in place  Skin:     General: Skin is warm and dry.      Coloration: Skin is not jaundiced.   Neurological:      Mental Status: She is alert.      Cranial Nerves: No cranial nerve deficit.      Sensory: No sensory deficit.      Motor: No weakness.         Significant Labs:  CBC:   Recent Labs   Lab 10/01/20  0310   WBC 13.32*   RBC 3.19*   HGB 9.0*   HCT 29.0*      MCV 91   MCH 28.2   MCHC 31.0*     CMP:   Recent Labs   Lab 10/01/20  0310      CALCIUM 8.6*   ALBUMIN 2.1*   PROT 5.7*      K 3.4*   CO2 22*      BUN 26*   CREATININE 0.6   ALKPHOS  309*   ALT 16   AST 32   BILITOT 7.0*       Significant Diagnostics:  I have reviewed all pertinent imaging results/findings within the past 24 hours.

## 2020-10-01 NOTE — ASSESSMENT & PLAN NOTE
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 9/3/2020, s/p ex-lap with washout and repair of gastric perforation 9/4/2020, ex-lap with washout abdominal closure 9/6/2020, found to have gastric leak, s/p ex-lap, washout, repair of gastric performation 9/18/2020.  Abdominal cultures with Klebsiella, Bacteroides, and yeast. CT abd/pelvis 9/25/2020 with extraluminal extravasation of ingested contrast adjacent to stomach fundus, NG tube and feeding tube protruding through gastric fundal defect, diffuse thickening of stomach, small/large bowel, bilateral pulmonary consolidative opacities. Patient with acute respiratory failure 9/25/2020 requiring intubation, concern for aspiration PNA / HAP. Due to critical illness and prolonged hospitalization, she was broadened to meropenem, vanc and micafungin on 9/24. ID signed off 9/25 as goals seemed to be shifting toward a focus on comfort, however patient has since clinically improved, is now extubated. She remains DNR. ID called back for further abx recommendations.     Recommendations:  - Continue trevor / vanc / micafungin based on prior cultures  - repeat blood cultures today given recurrent fever to 100.7, pt on TPN w/ PICC line  - will discuss need for repeat imaging to help guide antimicrobial therapy w/ surgical service in AM    Will follow

## 2020-10-01 NOTE — PROGRESS NOTES
"[facility]  Adult Nutrition  Progress Note    SUMMARY       Recommendations    1.) Change TPN to 85gm of AA, 165gm of dextrose, 50gm of lipids. TPN provides 1401kcal.   2.) Once medically appropriate advance diet as tolerated to low fiber per SLP texture recommendations.     Goals: 1. Pt's intake to meet % EEN and EPN by RD follow up.  Nutrition Goal Status: progressing towards goal  Communication of RD Recs: (POC)    Reason for Assessment    Reason For Assessment: RD follow-up  Diagnosis: (gastric outlet obstruction)  Relevant Medical History: Esophageal stricture; HTN; Liver fibrosis; Seizures; SCC; HTN; S/p liver tx  Interdisciplinary Rounds: did not attend  General Information Comments: NFPE completed 9/6, pt meets ASPEN guidelines for moderate malnutrition. Pt extubated. Pt on TPN at 60mL/hr and 250mL of smoflipids. GI- LBM 9/30.   Nutrition Discharge Planning: unable to determine    Nutrition Risk Screen    Nutrition Risk Screen: tube feeding or parenteral nutrition    Nutrition/Diet History    Spiritual, Cultural Beliefs, Tenriism Practices, Values that Affect Care: no  Food Allergies: NKFA  Factors Affecting Nutritional Intake: altered gastrointestinal function, NPO, on mechanical ventilation    Anthropometrics    Temp: 99.7 °F (37.6 °C)  Height Method: Stated  Height: 4' 11" (149.9 cm)  Height (inches): 59 in  Weight Method: Bed Scale  Weight: 67.8 kg (149 lb 7.6 oz)  Weight (lb): 149.47 lb  Ideal Body Weight (IBW), Female: 95 lb  % Ideal Body Weight, Female (lb): 156.84 %  BMI (Calculated): 30.2  BMI Grade: 25 - 29.9 - overweight       Lab/Procedures/Meds    Pertinent Labs Reviewed: reviewed  Pertinent Labs Comments: WBC 13.32, K 3.4, BUN 26, Ca 8.6, Phosphorus 2.6, Alb 2.1  Pertinent Medications Reviewed: reviewed  Pertinent Medications Comments: Bisacodyl, diflucan, furosemide, levothyroxine, meropenem, pantoprazole, tacrolimus    Physical Findings/Assessment     Edema 2+ " generalized  Skin wound left upper leg    Estimated/Assessed Needs    Weight Used For Calorie Calculations: 58 kg (127 lb 13.9 oz)  Energy Calorie Requirements (kcal): 1400  Energy Need Method: [place]-St Jeor(x1.3PAL)  Protein Requirements: 67-84 gm (1.2-1.5gm/kg)  Weight Used For Protein Calculations: 56 kg (123 lb 7.3 oz)(UBW)  Fluid Requirements (mL): 1 mL/kcal or per MD     RDA Method (mL): 1400         Nutrition Prescription Ordered    Current Diet Order: NPO (9/4)  Nutrition Order Comments: (none)  Current Nutrition Support Formula Ordered: (Custom TPN)  Current Nutrition Support Rate Ordered: 60 (ml)  Current Nutrition Support Frequency Ordered: mL/hr    Evaluation of Received Nutrient/Fluid Intake    Enteral Calories (kcal): 0  Enteral Protein (gm): 0  Enteral (Free Water) Fluid (mL): 0  Parenteral Calories (kcal): 1120  Parenteral Protein (gm): 70  Parenteral Fluid (mL): 1690  Lipid Calories (kcals): 0 kcals  GIR (Glucose Infusion Rate) (mg/kg/min): 1 mg/kg/min  Other Calories (kcal): 0  Total Calories (kcal): 1120  Total Calories (kcal/kg): 16  % Kcal Needs: 80  Total Protein (gm): 70  Total Protein (gm/kg): (NA)  % Protein Needs: 100  I/O: +20.4L sicne admission  Energy Calories Required: not meeting needs  Protein Required: meeting needs  Fluid Required: meeting needs  Comments: LBM 9/30  Tolerance: tolerating  % Intake of Estimated Energy Needs: 80  % Meal Intake: 0    Nutrition Risk    Level of Risk/Frequency of Follow-up: high , 2x weekly    Assessment and Plan    Moderate Protein-Calorie Malnutrition  Malnutrition in the context of Acute Illness/Injury     Related to (etiology):  Decreased intake      Signs and Symptoms (as evidenced by):  Energy Intake: <50% of estimated energy requirement for >3 weeks   Muscle Mass Depletion: mild depletion of temples   Weight Loss: 11% x 3 weeks per family   Fluid Accumulation: moderate     Interventions(treatment strategy):  Collaboration of care with other  providers  Parenteral nutrition     Nutrition Diagnosis Status:  ongoing     Monitor and Evaluation    Food and Nutrient Intake: energy intake, parenteral nutrition intake  Food and Nutrient Adminstration: enteral and parenteral nutrition administration, diet order  Knowledge/Beliefs/Attitudes: food and nutrition knowledge/skill  Anthropometric Measurements: weight, weight change  Biochemical Data, Medical Tests and Procedures: electrolyte and renal panel, gastrointestinal profile, glucose/endocrine profile, inflammatory profile, lipid profile  Nutrition-Focused Physical Findings: overall appearance     Malnutrition Assessment  Malnutrition Type: acute illness or injury          Weight Loss (Malnutrition): (11% x 3 weeks)  Energy Intake (Malnutrition): less than or equal to 50% for greater than or equal to 1 month   Orbital Region (Subcutaneous Fat Loss): mild depletion  Upper Arm Region (Subcutaneous Fat Loss): well nourished  Thoracic and Lumbar Region: well nourished   [place] Region (Muscle Loss): moderate depletion  Clavicle Bone Region (Muscle Loss): mild depletion  Clavicle and Acromion Bone Region (Muscle Loss): well nourished  Patellar Region (Muscle Loss): well nourished  Anterior Thigh Region (Muscle Loss): well nourished  Posterior Calf Region (Muscle Loss): well nourished   Edema (Fluid Accumulation): 2-->mild(lower extremities)             Nutrition Follow-Up    RD Follow-up?: Yes

## 2020-10-01 NOTE — PT/OT/SLP RE-EVAL
Occupational Therapy   Re-evaluation    Name: Elda Hernandez  MRN: [id number]  Admitting Diagnosis:  Perforated abdominal viscus 13 Days Post-Op    Recommendations:     Discharge Recommendations: nursing facility, skilled  Discharge Equipment Recommendations:  (TBD)  Barriers to discharge:  None    Assessment:     Elda Hernandez is a 50 y.o. female with a medical diagnosis of Perforated abdominal viscus.  She was able to perform supine/sit T/F c max A.  Pt was able to tolerate sitting EOB for approx. 5 minutes and then pt became hypertensive.  /96 while sitting EOB.  Pt had c/o dizziness.  B UE WFL.  Performance deficits affecting function are weakness, impaired endurance, impaired self care skills, impaired functional mobilty, impaired balance, decreased upper extremity function, decreased ROM, impaired coordination, impaired fine motor, impaired cardiopulmonary response to activity.      Rehab Prognosis:  Good; patient would benefit from acute skilled OT services to address these deficits and reach maximum level of function.       Plan:     Patient to be seen 3 x/week to address the above listed problems via self-care/home management, therapeutic activities, therapeutic exercises  · Plan of Care Expires: 10/15/20  · Plan of Care Reviewed with: patient, family    Subjective     Chief Complaint: Pt is s/p perforated abdominal viscula and septic shock.  Patient/Family stated goals: To get better.  Communicated with: RN prior to session.  Pain/Comfort:  · Pain Rating 1: 0/10    Objective:     Communicated with: RN prior to session.  Patient found supine with: blood pressure cuff, arterial line, bowel management system, mei catheter, KERRY drain, oxygen, peripheral IV, pulse ox (continuous), telemetry upon OT entry to room.    General Precautions: Standard, fall   Orthopedic Precautions:N/A   Braces: N/A     Occupational Performance:    Bed Mobility:    · Patient completed Supine to Sit with maximal assistance  and 2 persons  · Patient completed Sit to Supine with total assistance and 2 persons    Functional Mobility/Transfers:    · Functional Mobility: Pt sat on EOB for approx. 5 minutes c min A-CGA and has a tendency to lean posteriorly but corrects c verbal cues.  Returned to supine 2* HTN.        Cognitive/Visual Perceptual:  Cognitive/Psychosocial Skills:     -       Oriented to: Person, Place, Time and Situation   -       Follows Commands/attention:Follows multistep  commands    Physical Exam:  Upper Extremity Range of Motion:     -       Right Upper Extremity: WFL  -       Left Upper Extremity: WFL  Upper Extremity Strength:    -       Right Upper Extremity: WFL  -       Left Upper Extremity: WFL    AMPA 6 Click:  [facility] Total Score: 10Education:      Patient left supine with all lines intact, call button in reach, RN notified and family present    GOALS:   Multidisciplinary Problems     Occupational Therapy Goals        Problem: Occupational Therapy Goal    Goal Priority Disciplines Outcome Interventions   Occupational Therapy Goal     OT, PT/OT Ongoing, Progressing    Description: Goals to be met by: 10/7/20     Patient will increase functional independence with ADLs by performing:    UE Dressing with min A  LE Dressing with min A  Grooming while sitting EOB with min A  Toileting from [facility]  with min A for hygiene and clothing management.   Toilet transfer to [facility] with min A  Pt will tolerate sitting EOB with VSS with CGA while engaging in functional reaching tasks                      History:     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    Arnold-Chiari malformation     Depression     Esophageal stricture     Essential tremor     Hypertension     Left bundle branch block     Liver fibrosis, transplanted liver 10/2/2018    Suggested on fibroscan 10/2/18    Migraine without aura     MVP (mitral valve prolapse)     Non-rheumatic mitral regurgitation 10/1/2018     Non-rheumatic tricuspid valve insufficiency 10/1/2018    Osteoporosis     Recurrent urinary tract infection     Seizures     Shingles 2007    SIADH (syndrome of inappropriate ADH production)     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    Tricuspid valve prolapse     Urolithiasis     Von Gierke disease     s/p liver transplant       Past Surgical History:   Procedure Laterality Date    APPENDECTOMY  6/22/2007    APPLICATION OF WOUND VACUUM-ASSISTED CLOSURE DEVICE N/A 9/18/2020    Procedure: APPLICATION, WOUND VAC;  Surgeon: Zain Decker MD;  Location: [address];  Service: General;  Laterality: N/A;    COLONOSCOPY  5/13/2008    internal hemorrhoids    CRANIOTOMY      ESOPHAGOGASTRODUODENOSCOPY N/A 9/3/2020    Procedure: EGD (ESOPHAGOGASTRODUODENOSCOPY);  Surgeon: Tyrel Vergara MD;  Location: [facility];  Service: Endoscopy;  Laterality: N/A;    LAMINECTOMY  3/2001    LIVER TRANSPLANT  9/23/2002    OSSICULAR RECONSTRUCTION  10/4/1995    RIGHT REPLACEMENT PROSTHESIS for cholesteatoma    THYMECTOMY  5/2/2007    TONSILLECTOMY, ADENOIDECTOMY  1/21/2004    TOTAL ABDOMINAL HYSTERECTOMY  3/31/1994       Time Tracking:     OT Date of Treatment: 10/01/20  OT Start Time: 0917  OT Stop Time: 0938  OT Total Time (min): 21 min    Billable Minutes:Re-eval 11  Self Care/Home Management PAULINE Jean  10/1/2020

## 2020-10-01 NOTE — TREATMENT PLAN
Hepatology Treatment Plan    Elda Hernandez is a 50 y.o. female admitted to hospital 9/3/2020 (Hospital Day: 29) due to Perforated abdominal viscus. Hepatology following for immunosuppression management.    Lab Results   Component Value Date    TACROLIMUS 8.5 10/01/2020    TACROLIMUS 2.2 (L) 09/30/2020    TACROLIMUS <1.5 (L) 09/29/2020       Lab Results   Component Value Date    CREATININE 0.6 10/01/2020    CREATININE 0.6 09/30/2020    CREATININE 0.7 09/29/2020       Lab Results   Component Value Date    ALT 16 10/01/2020    AST 32 10/01/2020     (H) 04/07/2018    ALKPHOS 309 (H) 10/01/2020    BILITOT 7.0 (H) 10/01/2020    WBC 13.32 (H) 10/01/2020    HGB 9.0 (L) 10/01/2020     10/01/2020       Kidney function is stable  Liver enzymes are worsening    Plan  - Stop prograf given elevated prograf levels.       Please notify hepatology on day of discharge to discuss discharge medications and follow up.     Please obtain daily CBC, BMP, LFT, INR, immunosuppressant trough level    Thank you for involving us in the care of Elda Hernandez. Please call with any additional concerns or questions.    Fatuma Mathews MD  Gastroenterology Fellow

## 2020-10-01 NOTE — PROGRESS NOTES
[facility]  Critical Care - Surgery  Progress Note    Patient Name: Elda Hernandez  MRN: [id number]  Admission Date: 9/3/2020  Hospital Length of Stay: 28 days  Code Status: Full Code  Attending Provider: Clark Rodriguez MD  Primary Care Provider: Jordana Woods MD   Principal Problem: Perforated abdominal viscus    Subjective:     Hospital/ICU Course:  9/4: Pt admitted to SICU following ex lap for GI perf. Intubated, sedated. Wound vac in place.  Soon after arrival to unit a mottled appearance to RUE was noted.  Vascular consulted and US revealed R radial artery thrombosis.    9/5: Tachycardic..  R arm appearance greatly improved overnight.    9/6: Patient returned to OR for abdominal washout, closure. R arm with dopplerable signals to ulnar artery and palmar arch.   9/7: Attempted to wean patient off of sedation in an effort to move towards extubation. Patient weaned off of propofol. Precedex started. Patient's heart rate very labile and sensitize to sedation.   9/8: patient extubated  9/9: SHANICE. Labetalol PRN for HTN   9/10:  Diuresis increased.  Cr downtrending  9/11: Increased abdominal distension and elevated WBC count. CT abd  9/12: WBC to 45 today. Diflucan and zosyn initiated. Lactulose enema w/ improving mental status  9/13: WBC improved. Mental status improving. FWF initiated for hypernatremia.   9/14: White count continues to improve.  Oriented to person only this morning.    9/17: tolerating tube feeds, half TPN rate today. Possible step down   9/18: possible fall out of bed overnight. High G tube residual, now to gravity. Requiring comfortflo  9/19: s/p exlap. Intubated ans sedated. Weaning to extubate  9/20: No acute events overnight. Pt remains intubated, sedated. Plan to wean sedation for extubation.  9/21: Abdominal cultures grew Klebsiella; pt on zosyn  9/22: Pt did well with SBT yesterday; back on rate overnight.  Will try again today with hopes to extubate  9/23:  Extubated yesterday; doing well with nasal canula  9/24: remains on 3L NC. Diuresed yesterday w/ 240mg Lasix. Continue diuresis today with 40 Lasix BID  9/25: Blown L pupil / LUE weakness upon exam. CT head performed. CT abd/pelvis ordered.  9/26: Remains intubated. Pressure soft and H/H noted to be 6. Fluid resuscitation initiated. A-line not reading or drawing back.  9/27: Intubated. Ventilator settings weaned minimally. Hypotensive throughout the day. Continued fluid resuscitation.   9/28 - goals of care discussion. Now DNR. Continue medical management. Plan for repeat GoC if pressor requirement increases.   9/29 - pt extubated.  Returned to full code per husbands wishes      Interval History/Significant Events:   NAEON.  AAO x3. To have goals of care discussion today with patient and . Pain under control.     Follow-up For: Procedure(s) (LRB):  LAPAROTOMY, EXPLORATORY, DRAINAGE OF ABSCESS, REPAIR OF GASTRIC PERFORATION, GASTROSTOMY TUBE PLACEMENT (N/A)  APPLICATION, WOUND VAC (N/A)    Post-Operative Day: 12 Days Post-Op    Objective:     Vital Signs (Most Recent):  Temp: 99.7 °F (37.6 °C) (10/01/20 0700)  Pulse: (!) 120 (10/01/20 0800)  Resp: (!) 30 (10/01/20 0800)  BP: (!) 180/93 (10/01/20 0800)  SpO2: (!) 91 % (10/01/20 0800) Vital Signs (24h Range):  Temp:  [98.4 °F (36.9 °C)-99.7 °F (37.6 °C)] 99.7 °F (37.6 °C)  Pulse:  [106-126] 120  Resp:  [18-35] 30  SpO2:  [89 %-98 %] 91 %  BP: (124-180)/() 180/93     Weight: 67.8 kg (149 lb 7.6 oz)  Body mass index is 30.19 kg/m².      Intake/Output Summary (Last 24 hours) at 10/1/2020 0824  Last data filed at 10/1/2020 0800  Gross per 24 hour   Intake 2030 ml   Output 3014 ml   Net -984 ml       Physical Exam  Vitals signs and nursing note reviewed.   Constitutional:       Appearance: She is ill-appearing.   HENT:      Head: Normocephalic and atraumatic.      Nose:      Comments: NG tube in place     Mouth/Throat:      Mouth: Mucous membranes are dry.       Comments: Endotracheal tube in place  Eyes:      General: No scleral icterus.     Extraocular Movements: Extraocular movements intact.   Neck:      Musculoskeletal: Normal range of motion.      Comments: RIJ central line  Cardiovascular:      Rate and Rhythm: Normal rate and regular rhythm.      Heart sounds: Normal heart sounds.   Pulmonary:      Breath sounds: Rhonchi: bilateral.      Comments: 5L NC  Abdominal:      General: There is distension.      Palpations: Abdomen is soft.      Comments: Midline incision with wound vac in place; excellent seal to wound vac.   L abd drains in place with mild serous output; G tube to gravity.   Genitourinary:     Comments: Bhardwaj in place  Musculoskeletal:      Comments: LUE weakness noted.   Skin:     General: Skin is warm and dry.      Coloration: Skin is not jaundiced.   Neurological:      Mental Status: She is alert and oriented to person, place, and time.      Cranial Nerves: No cranial nerve deficit.      Sensory: No sensory deficit.      Comments: Sedated         Vents:  Vent Mode: Spont (09/29/20 1135)  Ventilator Initiated: Yes(chart correction) (09/25/20 1221)  Set Rate: 18 BPM (09/29/20 0722)  Vt Set: 400 mL (09/29/20 0722)  Pressure Support: 14 cmH20 (09/29/20 1135)  PEEP/CPAP: 8 cmH20 (09/29/20 1135)  Oxygen Concentration (%): 50 (09/29/20 1515)  Peak Airway Pressure: 13 cmH2O (09/29/20 1135)  Plateau Pressure: 23 cmH20 (09/29/20 0722)  Total Ve: 9.18 mL (09/29/20 1135)  Negative Inspiratory Force (cm H2O): -34 (09/29/20 1614)  F/VT Ratio<105 (RSBI): (!) 43.8 (09/29/20 0327)    Lines/Drains/Airways     Peripherally Inserted Central Catheter Line            PICC Double Lumen 09/30/20 1144 right basilic less than 1 day          Drain                 Urethral Catheter 09/14/20 1330 16 days         Closed/Suction Drain 09/18/20 1555 Left Abdomen Bulb 19 Fr. 12 days         Closed/Suction Drain 09/18/20 1559 Left Abdomen Bulb 19 Fr. 12 days          Gastrostomy/Enterostomy 09/18/20 1612 Other (see comments) LUQ decompression;feeding 12 days          Peripheral Intravenous Line                 Peripheral IV - Single Lumen 09/29/20 1051 22 G Right Hand 1 day                Significant Labs:    CBC/Anemia Profile:  Recent Labs   Lab 09/30/20  0259 10/01/20  0310   WBC 14.35* 13.32*   HGB 8.9* 9.0*   HCT 29.1* 29.0*    208   MCV 92 91   RDW 17.6* 17.3*        Chemistries:  Recent Labs   Lab 09/30/20  0259 10/01/20  0310    141   K 3.5 3.4*    109   CO2 21* 22*   BUN 28* 26*   CREATININE 0.6 0.6   CALCIUM 8.7 8.6*   ALBUMIN 2.4* 2.1*   PROT 5.6* 5.7*   BILITOT 6.6* 7.0*   ALKPHOS 284* 309*   ALT 13 16   AST 29 32   MG 1.9 1.8   PHOS 2.3* 2.6*       All pertinent labs within the past 24 hours have been reviewed.    Significant Imaging:  I have reviewed all pertinent imaging results/findings within the past 24 hours.      Assessment/Plan:     * Perforated abdominal viscus  Neuro:  - sedation: none  - analgesia: PRN oxy. Dilauded PRN  - Stroke symptoms 9/25. Improved currently.  CT head obtained. Vascular neurology consulted.     CV:   - Hypotensive requiring fluid resuscitation and low-dose pressors intermittently  - Currently off pressors  - femoral a-line placed 9/26 was removed due to dysfunction   - Strict monitoring of BP w/ cuff    Pulm:   - Extubated 9/22.  Reintubated 9/25.  Extubated 9/29.    - 5L NC; titrate supplemental O2 to maintain sats >92%  - CPT   - Duonebs Q6 PRN     FEN/GI: Perforated stomach; s/p washout and patch (9/4); abdominal closure (9/6)  - s/p ex-lap on 9/19   - continue to hold tube feeds at this time  - G tube, and L abdominal drains in place.  - G tube upsized  - Lactulose discontinued (9/14) in setting of normal ammonia level (9/10) and improving mental status  - Daily metabolic panel with PRN repletion of electrolytes  - pantoprazole for ulcer ppx  - Cont TPN  - alk phos, Tbili elevated; pt w/ remote history of liver  transplant.  CTM     Renal:  - Continue to monitor with daily metabolic labs  - nephrology on board, appreciate recs  - Bun/Cr 28/0.6 today; continues to improve  - Bhardwaj in place; monitoring I/Os  - GI regimen     HEME/ID:   - Afebrile  - H/H stable.  - Daily CBC  - s/p Liver transplant in 2002; Daily tacrolimus level with AM labs. Per hepatology, continue tacrolimus 1mg BID  - heparin for DVT ppx  - Abdominal culture grew Klebsiella pneumonia; sensitive to zosyn, & Candida parapsilosis.  - ABX regimen meropenum, and diflucan    Endo:  - Synthroid 40mcg daily  - insulin aspart for blood glucose control    Dispo:  - Continue SICU care, goals of care discussion today       Elida Haley MD  Critical Care - Surgery  [facility]

## 2020-10-01 NOTE — PROGRESS NOTES
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  Abdominal cultures with Klebsiella, Bacteroides, and yeast. CT abd/pelvis 9/25/2020 with extraluminal extravasation of ingested contrast adjacent to stomach fundus, NG tube and feeding tube protruding through gastric fundal defect, diffuse thickening of stomach, small/large bowel, bilateral pulmonary consolidative opacities. Patient with acute respiratory failure 9/25/2020 requiring intubation, concern for aspiration PNA / HAP. Due to critical illness and prolonged hospitalization, she was broadened to meropenem, vanc and micafungin on 9/24. ID signed off 9/25 as goals seemed to be shifting toward a focus on comfort, however patient has since clinically improved, is now extubated. She remains DNR. ID called back for further abx recommendations.     Recommendations:  - Continue trevor / vanc / micafungin based on prior cultures  - repeat blood cultures today given recurrent fever to 100.7, pt on TPN w/ PICC line  - will discuss need for repeat imaging to help guide antimicrobial therapy w/ surgical service in AM    Will follow        Anticipated Disposition: TBD    Thank you for your consult.  I will follow-up with patient. Please contact us if you have any additional questions.    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  Abdominal cultures with Klebsiella, Bacteroides, and yeast. CT abd/pelvis 9/25/2020 with extraluminal extravasation of ingested contrast adjacent to stomach fundus, NG tube and feeding tube protruding through gastric fundal defect, diffuse thickening of stomach, small/large bowel, bilateral pulmonary consolidative opacities.  Yesterday, patient intubated for respiratory failure, on vent FiO2 100%, PEEP 5. No pressor requirements.    ID called back on 10/1 for re-evaluation and recommendations on abx duration. No new abd cultures available since 9/18 (c. Parapsilosis, bacteroides, kleb pneumo). Patient has multiple drains and a wound vac in place. She is extubated and denies significant abd pain (states recently given pain med), but is tired during exam. Surgical notes reviewed, noted due to friable nature of gastric tissue, perforation is currently being controlled w/ percutaneous drainage, G-tube to drainage. Patient is on TPN. Fever of 100.7 today. WBC stable at 13. Remains on trevor, vanc and fluconazole.     Last CT 9/25:  Impression:     In this patient with recent gastric ulcer perforation status post surgical repair, there is extraluminal extravasation of ingested contrast adjacent to the stomach  fundus, in a similar location of prior perforation.  Small volume adjacent free intraperitoneal air.  Nasogastric tube and feeding tube appear to protrude through a gastric fundal defect.  Percutaneous drainage catheter in the left abdomen.     Diffuse thickening of the stomach, small and large bowel, worse since prior exam.  These findings are nonspecific and could be related to diffuse edema, infection, inflammation, or ischemia.     Bilateral pulmonary consolidative opacities, worse since prior.  Improved bilateral pleural effusions.     Significant narrowing of the celiac axis origin (sagittal series 602, image 99), however patent distally. This was not seen on prior contrast study dated 09/03/2020 .         Interval History: called back for abx recommendations. Pt now extubated, a&o, denies significant abd pain    Review of Systems   Constitutional: Positive for fever. Negative for activity change, appetite change, chills and unexpected weight change.   HENT: Negative for dental problem, ear discharge, ear pain, mouth sores, sinus pain, sore throat and trouble swallowing.    Eyes: Negative for pain and discharge.   Respiratory: Negative for cough, chest tightness, shortness of breath and wheezing.    Cardiovascular: Negative for chest pain and leg swelling.   Gastrointestinal: Positive for abdominal pain. Negative for abdominal distention, constipation, diarrhea, nausea and vomiting.   Genitourinary: Negative for difficulty urinating, dysuria, flank pain, frequency, genital sores and hematuria.   Musculoskeletal: Negative for arthralgias, joint swelling, neck pain and neck stiffness.   Skin: Negative for color change, rash and wound.   Neurological: Negative for dizziness, weakness, light-headedness, numbness and headaches.   Psychiatric/Behavioral: Negative for confusion. The patient is not nervous/anxious.      Objective:     Vital Signs (Most Recent):  Temp: 100.3 °F (37.9 °C) (10/01/20 1600)  Pulse: 103  (10/01/20 1700)  Resp: 16 (10/01/20 1700)  BP: (!) 157/73 (10/01/20 1700)  SpO2: 95 % (10/01/20 1700) Vital Signs (24h Range):  Temp:  [99 °F (37.2 °C)-100.7 °F (38.2 °C)] 100.3 °F (37.9 °C)  Pulse:  [] 103  Resp:  [13-37] 16  SpO2:  [90 %-98 %] 95 %  BP: (141-182)/() 157/73     Weight: 67.8 kg (149 lb 7.6 oz)  Body mass index is 30.19 kg/m².    Estimated Creatinine Clearance: 117.4 mL/min (based on SCr of 0.6 mg/dL).    Physical Exam  Vitals signs reviewed.   Constitutional:       General: She is not in acute distress.     Appearance: She is well-developed. She is ill-appearing. She is not diaphoretic.   HENT:      Head: Atraumatic.      Right Ear: External ear normal.      Left Ear: External ear normal.      Nose: Nose normal. No congestion or rhinorrhea.      Mouth/Throat:      Mouth: Mucous membranes are moist.      Pharynx: Oropharynx is clear. No oropharyngeal exudate or posterior oropharyngeal erythema.   Eyes:      General: No scleral icterus.        Right eye: No discharge.         Left eye: No discharge.      Extraocular Movements: Extraocular movements intact.      Conjunctiva/sclera: Conjunctivae normal.   Neck:      Musculoskeletal: Normal range of motion and neck supple.   Cardiovascular:      Rate and Rhythm: Normal rate and regular rhythm.      Pulses: Normal pulses.      Heart sounds: Normal heart sounds. No murmur.   Pulmonary:      Effort: Pulmonary effort is normal. No respiratory distress.      Breath sounds: Normal breath sounds. No wheezing.   Abdominal:      Comments: Mild distention, wound vac in place to midline incision with good seal, L sided abd drains in place w/ serous output, G-tube to gravity w/ cloudy serous output. L groin collection bag at site of prior A line, serous drainage present. Minimal tenderness to palpation.    Musculoskeletal: Normal range of motion.         General: No swelling or deformity.      Right lower leg: Edema present.      Left lower leg: Edema  present.      Comments: R arm PICC line   Skin:     General: Skin is warm and dry.      Findings: No erythema or rash.   Neurological:      General: No focal deficit present.      Mental Status: She is alert and oriented to person, place, and time. Mental status is at baseline.      Cranial Nerves: No cranial nerve deficit.   Psychiatric:         Mood and Affect: Mood normal.         Behavior: Behavior normal.         Thought Content: Thought content normal.         Judgment: Judgment normal.         Significant Labs:   CBC:   Recent Labs   Lab 09/30/20  0259 10/01/20  0310   WBC 14.35* 13.32*   HGB 8.9* 9.0*   HCT 29.1* 29.0*    208     CMP:   Recent Labs   Lab 09/30/20  0259 10/01/20  0310    141   K 3.5 3.4*    109   CO2 21* 22*   GLU 95 105   BUN 28* 26*   CREATININE 0.6 0.6   CALCIUM 8.7 8.6*   PROT 5.6* 5.7*   ALBUMIN 2.4* 2.1*   BILITOT 6.6* 7.0*   ALKPHOS 284* 309*   AST 29 32   ALT 13 16   ANIONGAP 10 10   EGFRNONAA >60.0 >60.0     Microbiology Results (last 7 days)     Procedure Component Value Units Date/Time    Fungus culture [[id number]]  (Abnormal) Collected: 09/18/20 1544    Order Status: Completed Specimen: Abscess from Abdomen Updated: 09/29/20 0652     Fungus (Mycology) Culture CANDIDA PARAPSILOSIS  Rare      Narrative:      1. Inter-Abdominal Abscess for cultures  2. Inter-Abdominal Abscess for cultures    Culture, VAP (BAL) [[id number]] Collected: 09/25/20 1238    Order Status: Completed Specimen: Bronchial Alveolar Lavage from BAL, LLL Updated: 09/28/20 0831     VAP BAL CULTURE Normal respiratory ted     Gram Stain (Respiratory) <10 epithelial cells per low power field.     Gram Stain (Respiratory) Rare WBC's     Gram Stain (Respiratory) No organisms seen          Significant Imaging: I have reviewed all pertinent imaging results/findings within the past 24 hours.

## 2020-10-01 NOTE — ASSESSMENT & PLAN NOTE
Neuro:  - sedation: none  - analgesia: PRN oxy. Dilauded PRN  - Stroke symptoms 9/25. Improved currently.  CT head obtained. Vascular neurology consulted.     CV:   - Hypotensive requiring fluid resuscitation and low-dose pressors intermittently  - Currently off pressors  - femoral a-line placed 9/26 was removed due to dysfunction   - Strict monitoring of BP w/ cuff    Pulm:   - Extubated 9/22.  Reintubated 9/25.  Extubated 9/29.    - 5L NC; titrate supplemental O2 to maintain sats >92%  - CPT   - Duonebs Q6 PRN     FEN/GI: Perforated stomach; s/p washout and patch (9/4); abdominal closure (9/6)  - s/p ex-lap on 9/19   - continue to hold tube feeds at this time  - G tube, and L abdominal drains in place.  - G tube upsized  - Lactulose discontinued (9/14) in setting of normal ammonia level (9/10) and improving mental status  - Daily metabolic panel with PRN repletion of electrolytes  - pantoprazole for ulcer ppx  - Cont TPN  - alk phos, Tbili elevated; pt w/ remote history of liver transplant.  CTM     Renal:  - Continue to monitor with daily metabolic labs  - nephrology on board, appreciate recs  - Bun/Cr 28/0.6 today; continues to improve  - Bhardwaj in place; monitoring I/Os  - GI regimen     HEME/ID:   - Afebrile  - H/H stable.  - Daily CBC  - s/p Liver transplant in 2002; Daily tacrolimus level with AM labs. Per hepatology, continue tacrolimus 1mg BID  - heparin for DVT ppx  - Abdominal culture grew Klebsiella pneumonia; sensitive to zosyn, & Candida parapsilosis.  - ABX regimen meropenum, and diflucan    Endo:  - Synthroid 40mcg daily  - insulin aspart for blood glucose control    Dispo:  - Continue SICU care, goals of care discussion today

## 2020-10-01 NOTE — PLAN OF CARE
Problem: Occupational Therapy Goal  Goal: Occupational Therapy Goal  Description: Goals to be met by: 10/15/20     Patient will increase functional independence with ADLs by performing:    UE Dressing with min A  LE Dressing with min A  Grooming while sitting EOB with min A  Toileting from BSC  with min A for hygiene and clothing management.   Toilet transfer to BSC with min A  Pt will tolerate sitting EOB with VSS with CGA while engaging in functional reaching tasks     Outcome: Ongoing, Progressing

## 2020-10-01 NOTE — PLAN OF CARE
10/01/20 0932   Discharge Reassessment   Assessment Type Discharge Planning Reassessment   Provided patient/caregiver education on the expected discharge date and the discharge plan Yes   Do you have any problems affording any of your prescribed medications? No   Discharge Plan A Other  (TBD: patient's discharge disposition will depend on prognosis)   DME Needed Upon Discharge  other (see comments)  (TBD)       Susana Pringle MPH, RN, CM  Ext. [phone]

## 2020-10-02 LAB
ALBUMIN SERPL BCP-MCNC: 1.8 G/DL (ref 3.5–5.2)
ALP SERPL-CCNC: 365 U/L (ref 55–135)
ALT SERPL W/O P-5'-P-CCNC: 21 U/L (ref 10–44)
ANION GAP SERPL CALC-SCNC: 7 MMOL/L (ref 8–16)
AST SERPL-CCNC: 45 U/L (ref 10–40)
BASOPHILS # BLD AUTO: 0.06 K/UL (ref 0–0.2)
BASOPHILS NFR BLD: 0.6 % (ref 0–1.9)
BILIRUB SERPL-MCNC: 5.8 MG/DL (ref 0.1–1)
BUN SERPL-MCNC: 28 MG/DL (ref 6–20)
CALCIUM SERPL-MCNC: 8.4 MG/DL (ref 8.7–10.5)
CHLORIDE SERPL-SCNC: 110 MMOL/L (ref 95–110)
CO2 SERPL-SCNC: 26 MMOL/L (ref 23–29)
CREAT SERPL-MCNC: 0.6 MG/DL (ref 0.5–1.4)
DIFFERENTIAL METHOD: ABNORMAL
EOSINOPHIL # BLD AUTO: 0.5 K/UL (ref 0–0.5)
EOSINOPHIL NFR BLD: 4.6 % (ref 0–8)
ERYTHROCYTE [DISTWIDTH] IN BLOOD BY AUTOMATED COUNT: 17.1 % (ref 11.5–14.5)
EST. GFR  (AFRICAN AMERICAN): >60 ML/MIN/1.73 M^2
EST. GFR  (NON AFRICAN AMERICAN): >60 ML/MIN/1.73 M^2
GLUCOSE SERPL-MCNC: 102 MG/DL (ref 70–110)
HCT VFR BLD AUTO: 25.9 % (ref 37–48.5)
HGB BLD-MCNC: 8 G/DL (ref 12–16)
IMM GRANULOCYTES # BLD AUTO: 0.15 K/UL (ref 0–0.04)
IMM GRANULOCYTES NFR BLD AUTO: 1.5 % (ref 0–0.5)
LYMPHOCYTES # BLD AUTO: 1.1 K/UL (ref 1–4.8)
LYMPHOCYTES NFR BLD: 10.9 % (ref 18–48)
MAGNESIUM SERPL-MCNC: 1.7 MG/DL (ref 1.6–2.6)
MAGNESIUM SERPL-MCNC: 2.4 MG/DL (ref 1.6–2.6)
MCH RBC QN AUTO: 28.2 PG (ref 27–31)
MCHC RBC AUTO-ENTMCNC: 30.9 G/DL (ref 32–36)
MCV RBC AUTO: 91 FL (ref 82–98)
MONOCYTES # BLD AUTO: 1.5 K/UL (ref 0.3–1)
MONOCYTES NFR BLD: 15.1 % (ref 4–15)
NEUTROPHILS # BLD AUTO: 6.6 K/UL (ref 1.8–7.7)
NEUTROPHILS NFR BLD: 67.3 % (ref 38–73)
NRBC BLD-RTO: 0 /100 WBC
PHOSPHATE SERPL-MCNC: 3.5 MG/DL (ref 2.7–4.5)
PLATELET # BLD AUTO: 192 K/UL (ref 150–350)
PMV BLD AUTO: 12 FL (ref 9.2–12.9)
POCT GLUCOSE: 103 MG/DL (ref 70–110)
POCT GLUCOSE: 104 MG/DL (ref 70–110)
POCT GLUCOSE: 113 MG/DL (ref 70–110)
POCT GLUCOSE: 92 MG/DL (ref 70–110)
POCT GLUCOSE: 92 MG/DL (ref 70–110)
POTASSIUM SERPL-SCNC: 3.8 MMOL/L (ref 3.5–5.1)
PROT SERPL-MCNC: 5.6 G/DL (ref 6–8.4)
RBC # BLD AUTO: 2.84 M/UL (ref 4–5.4)
SODIUM SERPL-SCNC: 143 MMOL/L (ref 136–145)
T4 FREE SERPL-MCNC: 1.03 NG/DL (ref 0.71–1.51)
TACROLIMUS BLD-MCNC: 6.3 NG/ML (ref 5–15)
TSH SERPL DL<=0.005 MIU/L-ACNC: 3.37 UIU/ML (ref 0.4–4)
WBC # BLD AUTO: 9.76 K/UL (ref 3.9–12.7)

## 2020-10-02 PROCEDURE — A4216 STERILE WATER/SALINE, 10 ML: HCPCS | Performed by: SURGERY

## 2020-10-02 PROCEDURE — 25500020 PHARM REV CODE 255: Performed by: SURGERY

## 2020-10-02 PROCEDURE — C9113 INJ PANTOPRAZOLE SODIUM, VIA: HCPCS | Performed by: STUDENT IN AN ORGANIZED HEALTH CARE EDUCATION/TRAINING PROGRAM

## 2020-10-02 PROCEDURE — 63600175 PHARM REV CODE 636 W HCPCS: Performed by: STUDENT IN AN ORGANIZED HEALTH CARE EDUCATION/TRAINING PROGRAM

## 2020-10-02 PROCEDURE — 83735 ASSAY OF MAGNESIUM: CPT

## 2020-10-02 PROCEDURE — 25000003 PHARM REV CODE 250: Performed by: STUDENT IN AN ORGANIZED HEALTH CARE EDUCATION/TRAINING PROGRAM

## 2020-10-02 PROCEDURE — 87040 BLOOD CULTURE FOR BACTERIA: CPT

## 2020-10-02 PROCEDURE — 85025 COMPLETE CBC W/AUTO DIFF WBC: CPT

## 2020-10-02 PROCEDURE — 80053 COMPREHEN METABOLIC PANEL: CPT

## 2020-10-02 PROCEDURE — 84443 ASSAY THYROID STIM HORMONE: CPT

## 2020-10-02 PROCEDURE — B4185 PARENTERAL SOL 10 GM LIPIDS: HCPCS | Performed by: SURGERY

## 2020-10-02 PROCEDURE — 99233 SBSQ HOSP IP/OBS HIGH 50: CPT | Mod: GC,,, | Performed by: INTERNAL MEDICINE

## 2020-10-02 PROCEDURE — 99233 PR SUBSEQUENT HOSPITAL CARE,LEVL III: ICD-10-PCS | Mod: GC,,, | Performed by: INTERNAL MEDICINE

## 2020-10-02 PROCEDURE — 93010 EKG 12-LEAD: ICD-10-PCS | Mod: ,,, | Performed by: INTERNAL MEDICINE

## 2020-10-02 PROCEDURE — 27000646 HC AEROBIKA DEVICE

## 2020-10-02 PROCEDURE — A4217 STERILE WATER/SALINE, 500 ML: HCPCS | Performed by: SURGERY

## 2020-10-02 PROCEDURE — 20600001 HC STEP DOWN PRIVATE ROOM

## 2020-10-02 PROCEDURE — 94664 DEMO&/EVAL PT USE INHALER: CPT

## 2020-10-02 PROCEDURE — 63600175 PHARM REV CODE 636 W HCPCS: Performed by: SURGERY

## 2020-10-02 PROCEDURE — 80197 ASSAY OF TACROLIMUS: CPT

## 2020-10-02 PROCEDURE — 84100 ASSAY OF PHOSPHORUS: CPT

## 2020-10-02 PROCEDURE — 25000003 PHARM REV CODE 250: Performed by: SURGERY

## 2020-10-02 PROCEDURE — 94668 MNPJ CHEST WALL SBSQ: CPT

## 2020-10-02 PROCEDURE — 93005 ELECTROCARDIOGRAM TRACING: CPT

## 2020-10-02 PROCEDURE — 27000221 HC OXYGEN, UP TO 24 HOURS

## 2020-10-02 PROCEDURE — 84439 ASSAY OF FREE THYROXINE: CPT

## 2020-10-02 PROCEDURE — 99291 PR CRITICAL CARE, E/M 30-74 MINUTES: ICD-10-PCS | Mod: 24,GC,, | Performed by: STUDENT IN AN ORGANIZED HEALTH CARE EDUCATION/TRAINING PROGRAM

## 2020-10-02 PROCEDURE — 99291 CRITICAL CARE FIRST HOUR: CPT | Mod: 24,GC,, | Performed by: STUDENT IN AN ORGANIZED HEALTH CARE EDUCATION/TRAINING PROGRAM

## 2020-10-02 PROCEDURE — 93010 ELECTROCARDIOGRAM REPORT: CPT | Mod: ,,, | Performed by: INTERNAL MEDICINE

## 2020-10-02 PROCEDURE — 99900035 HC TECH TIME PER 15 MIN (STAT)

## 2020-10-02 PROCEDURE — 94761 N-INVAS EAR/PLS OXIMETRY MLT: CPT

## 2020-10-02 RX ORDER — ACETAMINOPHEN 650 MG/1
650 SUPPOSITORY RECTAL EVERY 6 HOURS PRN
Status: DISCONTINUED | OUTPATIENT
Start: 2020-10-02 | End: 2020-10-14 | Stop reason: HOSPADM

## 2020-10-02 RX ORDER — FUROSEMIDE 10 MG/ML
20 INJECTION INTRAMUSCULAR; INTRAVENOUS ONCE
Status: COMPLETED | OUTPATIENT
Start: 2020-10-02 | End: 2020-10-02

## 2020-10-02 RX ADMIN — HEPARIN SODIUM 5000 UNITS: 5000 INJECTION INTRAVENOUS; SUBCUTANEOUS at 11:10

## 2020-10-02 RX ADMIN — HYDROMORPHONE HYDROCHLORIDE 0.5 MG: 1 INJECTION, SOLUTION INTRAMUSCULAR; INTRAVENOUS; SUBCUTANEOUS at 07:10

## 2020-10-02 RX ADMIN — MAGNESIUM SULFATE HEPTAHYDRATE: 500 INJECTION, SOLUTION INTRAMUSCULAR; INTRAVENOUS at 11:10

## 2020-10-02 RX ADMIN — SMOFLIPID 250 ML: 6; 6; 5; 3 INJECTION, EMULSION INTRAVENOUS at 11:10

## 2020-10-02 RX ADMIN — IOHEXOL 15 ML: 350 INJECTION, SOLUTION INTRAVENOUS at 04:10

## 2020-10-02 RX ADMIN — MEROPENEM AND SODIUM CHLORIDE 1 G: 1 INJECTION, SOLUTION INTRAVENOUS at 05:10

## 2020-10-02 RX ADMIN — POTASSIUM CHLORIDE 40 MEQ: 29.8 INJECTION, SOLUTION INTRAVENOUS at 05:10

## 2020-10-02 RX ADMIN — HEPARIN SODIUM 5000 UNITS: 5000 INJECTION INTRAVENOUS; SUBCUTANEOUS at 05:10

## 2020-10-02 RX ADMIN — FUROSEMIDE 20 MG: 10 INJECTION, SOLUTION INTRAMUSCULAR; INTRAVENOUS at 08:10

## 2020-10-02 RX ADMIN — ONDANSETRON 4 MG: 2 INJECTION INTRAMUSCULAR; INTRAVENOUS at 05:10

## 2020-10-02 RX ADMIN — HYDROMORPHONE HYDROCHLORIDE 0.5 MG: 1 INJECTION, SOLUTION INTRAMUSCULAR; INTRAVENOUS; SUBCUTANEOUS at 11:10

## 2020-10-02 RX ADMIN — IOHEXOL 75 ML: 350 INJECTION, SOLUTION INTRAVENOUS at 08:10

## 2020-10-02 RX ADMIN — Medication 10 ML: at 12:10

## 2020-10-02 RX ADMIN — BISACODYL 10 MG: 10 SUPPOSITORY RECTAL at 08:10

## 2020-10-02 RX ADMIN — LEVOTHYROXINE SODIUM ANHYDROUS 40 MCG: 100 INJECTION, POWDER, LYOPHILIZED, FOR SOLUTION INTRAVENOUS at 08:10

## 2020-10-02 RX ADMIN — Medication 10 ML: at 05:10

## 2020-10-02 RX ADMIN — Medication 10 ML: at 11:10

## 2020-10-02 RX ADMIN — MEROPENEM AND SODIUM CHLORIDE 1 G: 1 INJECTION, SOLUTION INTRAVENOUS at 01:10

## 2020-10-02 RX ADMIN — MAGNESIUM SULFATE 2 G: 2 INJECTION INTRAVENOUS at 05:10

## 2020-10-02 RX ADMIN — PANTOPRAZOLE SODIUM 40 MG: 40 INJECTION, POWDER, LYOPHILIZED, FOR SOLUTION INTRAVENOUS at 08:10

## 2020-10-02 RX ADMIN — MEROPENEM AND SODIUM CHLORIDE 1 G: 1 INJECTION, SOLUTION INTRAVENOUS at 11:10

## 2020-10-02 RX ADMIN — FLUCONAZOLE 400 MG: 2 INJECTION, SOLUTION INTRAVENOUS at 02:10

## 2020-10-02 RX ADMIN — HYDROMORPHONE HYDROCHLORIDE 0.5 MG: 1 INJECTION, SOLUTION INTRAMUSCULAR; INTRAVENOUS; SUBCUTANEOUS at 05:10

## 2020-10-02 RX ADMIN — HEPARIN SODIUM 5000 UNITS: 5000 INJECTION INTRAVENOUS; SUBCUTANEOUS at 01:10

## 2020-10-02 NOTE — SUBJECTIVE & OBJECTIVE
Interval History: No acute events overnight. On 4L NC    Medications:  Continuous Infusions:   TPN ADULT CENTRAL LINE CUSTOM 60 mL/hr at 10/02/20 0600     Scheduled Meds:   bisacodyL  10 mg Rectal Daily    fluconazole (DIFLUCAN) IVPB  400 mg Intravenous Q24H    heparin (porcine)  5,000 Units Subcutaneous Q8H    levothyroxine  40 mcg Intravenous Daily    lipid (SMOFLIPID)  250 mL Intravenous Daily    meropenem (MERREM) IVPB  1 g Intravenous Q8H    pantoprazole  40 mg Intravenous Daily    sodium chloride 0.9%  10 mL Intravenous Q6H     PRN Meds:acetaminophen, albuterol-ipratropium, calcium gluconate IVPB, calcium gluconate IVPB, calcium gluconate IVPB, dextrose 50%, dextrose 50%, glucagon (human recombinant), glucose, glucose, HYDROmorphone, insulin aspart U-100, labetaloL, magnesium sulfate IVPB, magnesium sulfate IVPB, ondansetron, potassium chloride in water **AND** potassium chloride in water **AND** potassium chloride in water, promethazine, sodium chloride 0.9%, Flushing PICC Protocol **AND** sodium chloride 0.9% **AND** sodium chloride 0.9%, sodium phosphate IVPB, sodium phosphate IVPB, sodium phosphate IVPB     Review of patient's allergies indicates:   Allergen Reactions    Codeine Itching     Other reaction(s): Itching    Lipitor [atorvastatin] Other (See Comments)     Other reaction(s): Muscle pain  Muscle cranmps    Morphine Itching     Other reaction(s): nausea and vomiting     Zoloft [sertraline] Other (See Comments)     Tremors/muscle spasms     Objective:     Vital Signs (Most Recent):  Temp: 99.9 °F (37.7 °C) (10/02/20 0300)  Pulse: 100 (10/02/20 0645)  Resp: (!) 23 (10/02/20 0645)  BP: (!) 159/73 (10/02/20 0645)  SpO2: (!) 92 % (10/02/20 0645) Vital Signs (24h Range):  Temp:  [99.6 °F (37.6 °C)-100.7 °F (38.2 °C)] 99.9 °F (37.7 °C)  Pulse:  [] 100  Resp:  [13-37] 23  SpO2:  [90 %-98 %] 92 %  BP: (141-182)/(64-94) 159/73     Weight: 67.8 kg (149 lb 7.6 oz)  Body mass index is 30.19  kg/m².    Intake/Output - Last 3 Shifts       09/30 0700 - 10/01 0659 10/01 0700 - 10/02 0659    I.V. (mL/kg) 438 (6.5) 774 (11.4)    IV Piggyback  255    TPN 1592 1568    Total Intake(mL/kg) 2030 (29.9) 2597 (38.3)    Urine (mL/kg/hr) 2800 (1.7) 2815 (1.7)    Drains 204 197    Other 10 100    Stool 0 0    Total Output 3014 3112    Net -984 -515          Stool Occurrence 1 x 1 x          Physical Exam  Vitals signs and nursing note reviewed.   Constitutional:       Appearance: She is ill-appearing.   HENT:      Head: Normocephalic and atraumatic.      Nose:      Comments: NG tube in place     Mouth/Throat:      Mouth: Mucous membranes are dry.   Eyes:      General: No scleral icterus.  Neck:      Comments: RIJ central line  Cardiovascular:      Rate and Rhythm: Normal rate and regular rhythm.      Heart sounds: Normal heart sounds.   Abdominal:      General: There is distension.      Palpations: Abdomen is soft.      Comments: Midline incision with wound vac in place; excellent seal to wound vac.   L abd drains in place with mild serous output; G tube to gravity.   Genitourinary:     Comments: L groin a line removed, now with drainage from insertion site  Bhardwaj in place  Skin:     General: Skin is warm and dry.      Coloration: Skin is not jaundiced.   Neurological:      Mental Status: She is alert.      Cranial Nerves: No cranial nerve deficit.      Sensory: No sensory deficit.      Motor: No weakness.         Significant Labs:  CBC:   Recent Labs   Lab 10/02/20  0352   WBC 9.76   RBC 2.84*   HGB 8.0*   HCT 25.9*      MCV 91   MCH 28.2   MCHC 30.9*     CMP:   Recent Labs   Lab 10/02/20  0352      CALCIUM 8.4*   ALBUMIN 1.8*   PROT 5.6*      K 3.8   CO2 26      BUN 28*   CREATININE 0.6   ALKPHOS 365*   ALT 21   AST 45*   BILITOT 5.8*       Significant Diagnostics:  I have reviewed all pertinent imaging results/findings within the past 24 hours.

## 2020-10-02 NOTE — SUBJECTIVE & OBJECTIVE
Interval History: Afebrile overnight (last low grade fever @1600). Currently on fluconazole and meropenem for intraabdominal infection due to perforated viscus.    Review of Systems   Constitutional: Positive for fever. Negative for activity change, appetite change, chills and unexpected weight change.   HENT: Negative for dental problem, ear discharge, ear pain, mouth sores, sinus pain, sore throat and trouble swallowing.    Eyes: Negative for pain and discharge.   Respiratory: Negative for cough, chest tightness, shortness of breath and wheezing.    Cardiovascular: Negative for chest pain and leg swelling.   Gastrointestinal: Positive for abdominal pain. Negative for abdominal distention, constipation, diarrhea, nausea and vomiting.   Genitourinary: Negative for difficulty urinating, dysuria, flank pain, frequency, genital sores and hematuria.   Musculoskeletal: Negative for arthralgias, joint swelling, neck pain and neck stiffness.   Skin: Negative for color change, rash and wound.   Neurological: Negative for dizziness, weakness, light-headedness, numbness and headaches.   Psychiatric/Behavioral: Negative for confusion. The patient is not nervous/anxious.      Objective:     Vital Signs (Most Recent):  Temp: 99.9 °F (37.7 °C) (10/02/20 0700)  Pulse: 102 (10/02/20 0700)  Resp: (!) 22 (10/02/20 0755)  BP: (!) 152/70 (10/02/20 0700)  SpO2: (!) 93 % (10/02/20 0700) Vital Signs (24h Range):  Temp:  [99.6 °F (37.6 °C)-100.7 °F (38.2 °C)] 99.9 °F (37.7 °C)  Pulse:  [] 102  Resp:  [13-37] 22  SpO2:  [91 %-98 %] 93 %  BP: (141-182)/(64-94) 152/70     Weight: 67.8 kg (149 lb 7.6 oz)  Body mass index is 30.19 kg/m².    Estimated Creatinine Clearance: 117.4 mL/min (based on SCr of 0.6 mg/dL).    Physical Exam  Vitals signs reviewed.   Constitutional:       General: She is not in acute distress.     Appearance: She is well-developed. She is ill-appearing.   HENT:      Head: Normocephalic and atraumatic.      Nose: Nose  normal. No congestion or rhinorrhea.      Mouth/Throat:      Mouth: Mucous membranes are moist.      Pharynx: Oropharynx is clear.   Eyes:      Extraocular Movements: Extraocular movements intact.      Conjunctiva/sclera: Conjunctivae normal.   Neck:      Musculoskeletal: Normal range of motion and neck supple.   Cardiovascular:      Rate and Rhythm: Normal rate and regular rhythm.      Pulses: Normal pulses.      Heart sounds: Normal heart sounds. No murmur.   Pulmonary:      Effort: Pulmonary effort is normal. No respiratory distress.      Breath sounds: Normal breath sounds.   Abdominal:      Comments: Mild distention, wound vac in place to midline incision with good seal, L sided abd drains in place w/ serous output, G-tube to gravity w/ cloudy serous output. L groin collection bag at site of prior A line, serous drainage present. Minimal tenderness to palpation.    Musculoskeletal: Normal range of motion.         General: No swelling.      Comments: R arm PICC line   Skin:     General: Skin is warm and dry.   Neurological:      General: No focal deficit present.      Mental Status: She is alert and oriented to person, place, and time.   Psychiatric:         Mood and Affect: Mood normal.         Behavior: Behavior normal.         Significant Labs: All pertinent labs within the past 24 hours have been reviewed.    Significant Imaging: I have reviewed all pertinent imaging results/findings within the past 24 hours.

## 2020-10-02 NOTE — NURSING TRANSFER
Nursing Transfer Note      10/2/2020     Transfer From: SICU    Transfer via stretcher    Transfer with  to O2, cardiac monitoring    Transported by RNs    Medicines sent: No    Chart send with patient: Yes    Notified: spouse    Patient reassessed at: 1430 by Bereket CLOUD, at bedside to confirm vitals and pt     Upon arrival to floor: cardiac monitor applied, patient oriented to room, call bell in reach and bed in lowest position

## 2020-10-02 NOTE — SUBJECTIVE & OBJECTIVE
Interval History: NAEON.   Feeling better this morning.   Worked with PT yesterday.     Medications:  Continuous Infusions:   TPN ADULT CENTRAL LINE CUSTOM 60 mL/hr at 10/02/20 1200    TPN ADULT CENTRAL LINE CUSTOM       Scheduled Meds:   bisacodyL  10 mg Rectal Daily    fluconazole (DIFLUCAN) IVPB  400 mg Intravenous Q24H    heparin (porcine)  5,000 Units Subcutaneous Q8H    levothyroxine  40 mcg Intravenous Daily    lipid (SMOFLIPID)  250 mL Intravenous Daily    meropenem (MERREM) IVPB  1 g Intravenous Q8H    pantoprazole  40 mg Intravenous Daily    sodium chloride 0.9%  10 mL Intravenous Q6H     PRN Meds:acetaminophen, albuterol-ipratropium, calcium gluconate IVPB, calcium gluconate IVPB, calcium gluconate IVPB, dextrose 50%, dextrose 50%, glucagon (human recombinant), HYDROmorphone, insulin aspart U-100, iohexol, labetaloL, magnesium sulfate IVPB, magnesium sulfate IVPB, ondansetron, potassium chloride in water **AND** potassium chloride in water **AND** potassium chloride in water, promethazine, sodium chloride 0.9%, Flushing PICC Protocol **AND** sodium chloride 0.9% **AND** sodium chloride 0.9%, sodium phosphate IVPB, sodium phosphate IVPB, sodium phosphate IVPB     Review of patient's allergies indicates:   Allergen Reactions    Codeine Itching     Other reaction(s): Itching    Lipitor [atorvastatin] Other (See Comments)     Other reaction(s): Muscle pain  Muscle cranmps    Morphine Itching     Other reaction(s): nausea and vomiting     Zoloft [sertraline] Other (See Comments)     Tremors/muscle spasms     Objective:     Vital Signs (Most Recent):  Temp: 99.3 °F (37.4 °C) (10/02/20 1100)  Pulse: 100 (10/02/20 1200)  Resp: 16 (10/02/20 1200)  BP: (!) 155/77 (10/02/20 1200)  SpO2: 95 % (10/02/20 1200) Vital Signs (24h Range):  Temp:  [99.3 °F (37.4 °C)-100.3 °F (37.9 °C)] 99.3 °F (37.4 °C)  Pulse:  [] 100  Resp:  [13-36] 16  SpO2:  [91 %-98 %] 95 %  BP: (144-177)/(64-89) 155/77     Weight:  67.8 kg (149 lb 7.6 oz)  Body mass index is 30.19 kg/m².    Intake/Output - Last 3 Shifts       09/30 0700 - 10/01 0659 10/01 0700 - 10/02 0659 10/02 0700 - 10/03 0659    I.V. (mL/kg) 438 (6.5) 974 (14.4)     IV Piggyback  355     TPN 1592 1568     Total Intake(mL/kg) 2030 (29.9) 2897 (42.7)     Urine (mL/kg/hr) 2800 (1.7) 2815 (1.7) 1175 (3.4)    Drains 204 197 5    Other 10 100 50    Stool 0 0     Total Output 3014 3112 1230    Net -984 -215 -1230           Stool Occurrence 1 x 1 x           Physical Exam  Vitals signs and nursing note reviewed.   Constitutional:       General: She is not in acute distress.  HENT:      Head: Normocephalic and atraumatic.      Nose:      Comments: NG tube in place     Mouth/Throat:      Mouth: Mucous membranes are dry.   Eyes:      General: No scleral icterus.  Neck:      Comments: RIJ central line  Cardiovascular:      Rate and Rhythm: Normal rate and regular rhythm.      Heart sounds: Normal heart sounds.   Pulmonary:      Effort: Pulmonary effort is normal. No respiratory distress.      Breath sounds: Rhonchi: bilateral.   Abdominal:      General: There is distension.      Palpations: Abdomen is soft.      Comments: Midline incision with wound vac in place; excellent seal to wound vac.   L abd drains in place with mild serous output; G tube to gravity.   Genitourinary:     Comments: L groin a line removed, now with drainage from insertion site  Bhardwaj in place  Skin:     General: Skin is warm and dry.      Coloration: Skin is not jaundiced.   Neurological:      General: No focal deficit present.      Mental Status: She is alert and oriented to person, place, and time.      Cranial Nerves: No cranial nerve deficit.      Sensory: No sensory deficit.      Motor: No weakness.   Psychiatric:         Mood and Affect: Mood normal.         Behavior: Behavior normal.         Significant Labs:  CBC:   Recent Labs   Lab 10/02/20  0352   WBC 9.76   RBC 2.84*   HGB 8.0*   HCT 25.9*       MCV 91   MCH 28.2   MCHC 30.9*     CMP:   Recent Labs   Lab 10/02/20  0352      CALCIUM 8.4*   ALBUMIN 1.8*   PROT 5.6*      K 3.8   CO2 26      BUN 28*   CREATININE 0.6   ALKPHOS 365*   ALT 21   AST 45*   BILITOT 5.8*       Significant Diagnostics:  I have reviewed all pertinent imaging results/findings within the past 24 hours.

## 2020-10-02 NOTE — PROGRESS NOTES
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  Abdominal cultures with Klebsiella, Bacteroides, and yeast. CT abd/pelvis 9/25/2020 with extraluminal extravasation of ingested contrast adjacent to stomach fundus, NG tube and feeding tube protruding through gastric fundal defect, diffuse thickening of stomach, small/large bowel, bilateral pulmonary consolidative opacities. Patient with acute respiratory failure 9/25/2020 requiring intubation, concern for aspiration PNA / HAP. Due to critical illness and prolonged hospitalization, she was broadened to meropenem, vanc and micafungin on 9/24. ID signed off 9/25 as goals seemed to be shifting toward a focus on comfort, however patient has since clinically improved, is now extubated. She remains DNR. ID called back for further abx recommendations.     Abdominal cx 9/18:  Klebsiella, Bacteroides, candida parapsilosis  BCX 9/4, 10/2- NGTD    Antimicrobials:  vanc 9/25- 9/28  Ceftriaxone 9/23- 9/35; Meropenem 9/25-  Micafungin 9/25- 9/30; Fluconazole 9/30-    Recommendations:  - Continue trevor and fluconazole for now.  - will need repeat imaging +/- new culture data to be able to provide recs on  antibiotics and duration for tx.            Anticipated Disposition: tbd    Thank you for your consult. I will follow-up with patient. Please contact us if you have any additional questions.    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  Abdominal cultures with Klebsiella, Bacteroides, and yeast. CT abd/pelvis 9/25/2020 with extraluminal extravasation of ingested contrast adjacent to stomach fundus, NG tube and feeding tube protruding through gastric fundal defect, diffuse thickening of stomach, small/large bowel, bilateral pulmonary consolidative opacities.  Yesterday, patient intubated for respiratory failure, on vent FiO2 100%, PEEP 5. No pressor requirements.    ID called back on 10/1 for re-evaluation and recommendations on abx duration. No new abd cultures available since 9/18 (c. Parapsilosis, bacteroides, kleb pneumo). Patient has multiple drains and a wound vac in place. She is extubated and denies significant abd pain (states recently given pain med), but is tired during exam. Surgical notes reviewed, noted due to friable nature of gastric tissue, perforation is currently being controlled w/ percutaneous drainage, G-tube to drainage. Patient is on TPN. Fever of 100.7 today. WBC stable at 13. Remains on trevor, vanc and fluconazole.     Last CT 9/25:  Impression:     In this patient with recent  gastric ulcer perforation status post surgical repair, there is extraluminal extravasation of ingested contrast adjacent to the stomach fundus, in a similar location of prior perforation.  Small volume adjacent free intraperitoneal air.  Nasogastric tube and feeding tube appear to protrude through a gastric fundal defect.  Percutaneous drainage catheter in the left abdomen.     Diffuse thickening of the stomach, small and large bowel, worse since prior exam.  These findings are nonspecific and could be related to diffuse edema, infection, inflammation, or ischemia.     Bilateral pulmonary consolidative opacities, worse since prior.  Improved bilateral pleural effusions.     Significant narrowing of the celiac axis origin (sagittal series 602, image 99), however patent distally. This was not seen on prior contrast study dated 09/03/2020 .         Interval History: Afebrile overnight (last low grade fever @1600). Currently on fluconazole and meropenem for intraabdominal infection due to perforated viscus.    Review of Systems   Constitutional: Positive for fever. Negative for activity change, appetite change, chills and unexpected weight change.   HENT: Negative for dental problem, ear discharge, ear pain, mouth sores, sinus pain, sore throat and trouble swallowing.    Eyes: Negative for pain and discharge.   Respiratory: Negative for cough, chest tightness, shortness of breath and wheezing.    Cardiovascular: Negative for chest pain and leg swelling.   Gastrointestinal: Positive for abdominal pain. Negative for abdominal distention, constipation, diarrhea, nausea and vomiting.   Genitourinary: Negative for difficulty urinating, dysuria, flank pain, frequency, genital sores and hematuria.   Musculoskeletal: Negative for arthralgias, joint swelling, neck pain and neck stiffness.   Skin: Negative for color change, rash and wound.   Neurological: Negative for dizziness, weakness, light-headedness, numbness and headaches.    Psychiatric/Behavioral: Negative for confusion. The patient is not nervous/anxious.      Objective:     Vital Signs (Most Recent):  Temp: 99.9 °F (37.7 °C) (10/02/20 0700)  Pulse: 102 (10/02/20 0700)  Resp: (!) 22 (10/02/20 0755)  BP: (!) 152/70 (10/02/20 0700)  SpO2: (!) 93 % (10/02/20 0700) Vital Signs (24h Range):  Temp:  [99.6 °F (37.6 °C)-100.7 °F (38.2 °C)] 99.9 °F (37.7 °C)  Pulse:  [] 102  Resp:  [13-37] 22  SpO2:  [91 %-98 %] 93 %  BP: (141-182)/(64-94) 152/70     Weight: 67.8 kg (149 lb 7.6 oz)  Body mass index is 30.19 kg/m².    Estimated Creatinine Clearance: 117.4 mL/min (based on SCr of 0.6 mg/dL).    Physical Exam  Vitals signs reviewed.   Constitutional:       General: She is not in acute distress.     Appearance: She is well-developed. She is ill-appearing.   HENT:      Head: Normocephalic and atraumatic.      Nose: Nose normal. No congestion or rhinorrhea.      Mouth/Throat:      Mouth: Mucous membranes are moist.      Pharynx: Oropharynx is clear.   Eyes:      Extraocular Movements: Extraocular movements intact.      Conjunctiva/sclera: Conjunctivae normal.   Neck:      Musculoskeletal: Normal range of motion and neck supple.   Cardiovascular:      Rate and Rhythm: Normal rate and regular rhythm.      Pulses: Normal pulses.      Heart sounds: Normal heart sounds. No murmur.   Pulmonary:      Effort: Pulmonary effort is normal. No respiratory distress.      Breath sounds: Normal breath sounds.   Abdominal:      Comments: Mild distention, wound vac in place to midline incision with good seal, L sided abd drains in place w/ serous output, G-tube to gravity w/ cloudy serous output. L groin collection bag at site of prior A line, serous drainage present. Minimal tenderness to palpation.    Musculoskeletal: Normal range of motion.         General: No swelling.      Comments: R arm PICC line   Skin:     General: Skin is warm and dry.   Neurological:      General: No focal deficit present.       Mental Status: She is alert and oriented to person, place, and time.   Psychiatric:         Mood and Affect: Mood normal.         Behavior: Behavior normal.         Significant Labs: All pertinent labs within the past 24 hours have been reviewed.    Significant Imaging: I have reviewed all pertinent imaging results/findings within the past 24 hours.

## 2020-10-02 NOTE — PROGRESS NOTES
[facility]  General Surgery  Progress Note    Subjective:     History of Present Illness:  Ms. Hernandez is a 51yo F w/PMH of von Gierke disease s/p liver transplant (2002, on tacro + MMF, follows Dr. Nielsen), hx chronic rejection (bx 2015 with acute and chronic rejection s/p steroids), biliary stricture and ductopenic rejection, recently with cirrhosis on fibroscan (10/2019), HTN, and depression who presents as a transfer for further evaluation of gastric outlet obstruction and esophageal stricturing.  Patient decompensated requiring multiple pressors and intubation. CT scan showed free air. Prior to intubation patient reported severe abdominal pain.   states that patient has had epigastric pain, nausea, and vomiting for several days.     Post-Op Info:  Procedure(s) (LRB):  LAPAROTOMY, EXPLORATORY, DRAINAGE OF ABSCESS, REPAIR OF GASTRIC PERFORATION, GASTROSTOMY TUBE PLACEMENT (N/A)  APPLICATION, WOUND VAC (N/A)   14 Days Post-Op     Interval History: No acute events overnight. On 4L NC    Medications:  Continuous Infusions:   TPN ADULT CENTRAL LINE CUSTOM 60 mL/hr at 10/02/20 0600     Scheduled Meds:   bisacodyL  10 mg Rectal Daily    fluconazole (DIFLUCAN) IVPB  400 mg Intravenous Q24H    heparin (porcine)  5,000 Units Subcutaneous Q8H    levothyroxine  40 mcg Intravenous Daily    lipid (SMOFLIPID)  250 mL Intravenous Daily    meropenem (MERREM) IVPB  1 g Intravenous Q8H    pantoprazole  40 mg Intravenous Daily    sodium chloride 0.9%  10 mL Intravenous Q6H     PRN Meds:acetaminophen, albuterol-ipratropium, calcium gluconate IVPB, calcium gluconate IVPB, calcium gluconate IVPB, dextrose 50%, dextrose 50%, glucagon (human recombinant), glucose, glucose, HYDROmorphone, insulin aspart U-100, labetaloL, magnesium sulfate IVPB, magnesium sulfate IVPB, ondansetron, potassium chloride in water **AND** potassium chloride in water **AND** potassium chloride in water, promethazine, sodium  chloride 0.9%, Flushing PICC Protocol **AND** sodium chloride 0.9% **AND** sodium chloride 0.9%, sodium phosphate IVPB, sodium phosphate IVPB, sodium phosphate IVPB     Review of patient's allergies indicates:   Allergen Reactions    Codeine Itching     Other reaction(s): Itching    Lipitor [atorvastatin] Other (See Comments)     Other reaction(s): Muscle pain  Muscle cranmps    Morphine Itching     Other reaction(s): nausea and vomiting     Zoloft [sertraline] Other (See Comments)     Tremors/muscle spasms     Objective:     Vital Signs (Most Recent):  Temp: 99.9 °F (37.7 °C) (10/02/20 0300)  Pulse: 100 (10/02/20 0645)  Resp: (!) 23 (10/02/20 0645)  BP: (!) 159/73 (10/02/20 0645)  SpO2: (!) 92 % (10/02/20 0645) Vital Signs (24h Range):  Temp:  [99.6 °F (37.6 °C)-100.7 °F (38.2 °C)] 99.9 °F (37.7 °C)  Pulse:  [] 100  Resp:  [13-37] 23  SpO2:  [90 %-98 %] 92 %  BP: (141-182)/(64-94) 159/73     Weight: 67.8 kg (149 lb 7.6 oz)  Body mass index is 30.19 kg/m².    Intake/Output - Last 3 Shifts       09/30 0700 - 10/01 0659 10/01 0700 - 10/02 0659    I.V. (mL/kg) 438 (6.5) 774 (11.4)    IV Piggyback  255    TPN 1592 1568    Total Intake(mL/kg) 2030 (29.9) 2597 (38.3)    Urine (mL/kg/hr) 2800 (1.7) 2815 (1.7)    Drains 204 197    Other 10 100    Stool 0 0    Total Output 3014 3112    Net -984 -515          Stool Occurrence 1 x 1 x          Physical Exam  Vitals signs and nursing note reviewed.   Constitutional:       Appearance: She is ill-appearing.   HENT:      Head: Normocephalic and atraumatic.      Nose:      Comments: NG tube in place     Mouth/Throat:      Mouth: Mucous membranes are dry.   Eyes:      General: No scleral icterus.  Neck:      Comments: RIJ central line  Cardiovascular:      Rate and Rhythm: Normal rate and regular rhythm.      Heart sounds: Normal heart sounds.   Abdominal:      General: There is distension.      Palpations: Abdomen is soft.      Comments: Midline incision with wound vac in  place; excellent seal to wound vac.   L abd drains in place with mild serous output; G tube to gravity.   Genitourinary:     Comments: L groin a line removed, now with drainage from insertion site  Bhardwaj in place  Skin:     General: Skin is warm and dry.      Coloration: Skin is not jaundiced.   Neurological:      Mental Status: She is alert.      Cranial Nerves: No cranial nerve deficit.      Sensory: No sensory deficit.      Motor: No weakness.         Significant Labs:  CBC:   Recent Labs   Lab 10/02/20  0352   WBC 9.76   RBC 2.84*   HGB 8.0*   HCT 25.9*      MCV 91   MCH 28.2   MCHC 30.9*     CMP:   Recent Labs   Lab 10/02/20  0352      CALCIUM 8.4*   ALBUMIN 1.8*   PROT 5.6*      K 3.8   CO2 26      BUN 28*   CREATININE 0.6   ALKPHOS 365*   ALT 21   AST 45*   BILITOT 5.8*       Significant Diagnostics:  I have reviewed all pertinent imaging results/findings within the past 24 hours.    Assessment/Plan:     * Perforated abdominal viscus  50 y.o. female w/ free air on CT scan. S/p emergent ex lap on 9/4 w/ findings of gastric perforation, primary repair. Now w/  abdominal closure on 9/6, skin stapled closed, KERRY drains removed. Class A to OR on 9/18 for free air and extrav or oral contrast seen on CT. CT on 9/25 redemonstrated contrast leaking from stomach, but well-drained with surgical drain.    Continue SICU care, appreciate their assistance  No operative intervention for gastric leaking at this time. Leak controlled with elian drain. Tissue is so friable that repeat operative intervention around the G tube will not provide a long-term solution.   G tube and NG to gravity/suction  Wound vac changes w/ wound care  Patient previously made DNR but since extubated, is now full code.         Rocio Hidalgo MD  General Surgery  [facility]

## 2020-10-02 NOTE — PROGRESS NOTES
MD Yates notified of pt with a few episodes of bradycardia, converting on own. Lowest HR noted was 42. Pt bp tolerated well. EKG orders placed. Will continue to monitor.

## 2020-10-02 NOTE — ASSESSMENT & PLAN NOTE
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  Abdominal cultures with Klebsiella, Bacteroides, and yeast. CT abd/pelvis 9/25/2020 with extraluminal extravasation of ingested contrast adjacent to stomach fundus, NG tube and feeding tube protruding through gastric fundal defect, diffuse thickening of stomach, small/large bowel, bilateral pulmonary consolidative opacities. Patient with acute respiratory failure 9/25/2020 requiring intubation, concern for aspiration PNA / HAP. Due to critical illness and prolonged hospitalization, she was broadened to meropenem, vanc and micafungin on 9/24. ID signed off 9/25 as goals seemed to be shifting toward a focus on comfort, however patient has since clinically improved, is now extubated. She remains DNR. ID called back for further abx recommendations.     Abdominal cx 9/18:  Klebsiella, Bacteroides, candida parapsilosis  BCX 9/4, 10/2- NGTD    Antimicrobials:  vanc 9/25- 9/28  Ceftriaxone 9/23- 9/35; Meropenem 9/25-  Micafungin 9/25- 9/30; Fluconazole 9/30-    Recommendations:  - Continue trevor and fluconazole for now.  - will need repeat imaging +/- new culture data to be able to provide recs on antibiotics and duration for tx.

## 2020-10-02 NOTE — PLAN OF CARE
Pt intermittently disoriented to situation. Spo2 >93% on 4L NC. MAP mainatined >65 and SBP maintained <180. TPN and lipids infusing. 2 JPs with ~10 cc milky serous output each. G tube to gravity with 35 cc output. Wound vac in place with minimal output overnight. Bhardwaj intact with uo  cc/hr. BC x1 sent. Pt updated on plan of care throughout shift, pt's  updated via phone. See flow sheet for assessment info.    Skin: Bilateral blisters to upper extremities. Midabdominal incision elias with staples, wound vac in place. Old L KERRY and L fem a-line sites leaking serous fluid, drainage bags in place. Wound care perfomed as ordered. Pt on immerse mattress. Foam dsgs in place, heels elevated. Weight shift assistance provided q2h.

## 2020-10-02 NOTE — NURSING
Pt only able to tolerate 1/2 of first contrast drink. Pt became too nauseous to tolerate. Nausea meds given, encouraged pt to drink if tolerated. CT department made aware.

## 2020-10-02 NOTE — PROGRESS NOTES
[facility]  Critical Care - Surgery  Progress Note    Patient Name: Elda Hernandez  MRN: [id number]  Admission Date: 9/3/2020  Hospital Length of Stay: 29 days  Code Status: Full Code  Attending Provider: Clark Rodriguez MD  Primary Care Provider: Jordana Woods MD   Principal Problem: Perforated abdominal viscus    Subjective:     Hospital/ICU Course:  9/4: Pt admitted to SICU following ex lap for GI perf. Intubated, sedated. Wound vac in place.  Soon after arrival to unit a mottled appearance to RUE was noted.  Vascular consulted and US revealed R radial artery thrombosis.    9/5: Tachycardic..  R arm appearance greatly improved overnight.    9/6: Patient returned to OR for abdominal washout, closure. R arm with dopplerable signals to ulnar artery and palmar arch.   9/7: Attempted to wean patient off of sedation in an effort to move towards extubation. Patient weaned off of propofol. Precedex started. Patient's heart rate very labile and sensitize to sedation.   9/8: patient extubated  9/9: SHANICE. Labetalol PRN for HTN   9/10:  Diuresis increased.  Cr downtrending  9/11: Increased abdominal distension and elevated WBC count. CT abd  9/12: WBC to 45 today. Diflucan and zosyn initiated. Lactulose enema w/ improving mental status  9/13: WBC improved. Mental status improving. FWF initiated for hypernatremia.   9/14: White count continues to improve.  Oriented to person only this morning.    9/17: tolerating tube feeds, half TPN rate today. Possible step down   9/18: possible fall out of bed overnight. High G tube residual, now to gravity. Requiring comfortflo  9/19: s/p exlap. Intubated ans sedated. Weaning to extubate  9/20: No acute events overnight. Pt remains intubated, sedated. Plan to wean sedation for extubation.  9/21: Abdominal cultures grew Klebsiella; pt on zosyn  9/22: Pt did well with SBT yesterday; back on rate overnight.  Will try again today with hopes to extubate  9/23:  Extubated yesterday; doing well with nasal canula  9/24: remains on 3L NC. Diuresed yesterday w/ 240mg Lasix. Continue diuresis today with 40 Lasix BID  9/25: Blown L pupil / LUE weakness upon exam. CT head performed. CT abd/pelvis ordered.  9/26: Remains intubated. Pressure soft and H/H noted to be 6. Fluid resuscitation initiated. A-line not reading or drawing back.  9/27: Intubated. Ventilator settings weaned minimally. Hypotensive throughout the day. Continued fluid resuscitation.   9/28 - goals of care discussion. Now DNR. Continue medical management. Plan for repeat GoC if pressor requirement increases.   9/29 - pt extubated.  Returned to full code per husbands wishes      Interval History: NAEON.   Feeling better this morning.   Worked with PT yesterday.     Medications:  Continuous Infusions:   TPN ADULT CENTRAL LINE CUSTOM 60 mL/hr at 10/02/20 1200    TPN ADULT CENTRAL LINE CUSTOM       Scheduled Meds:   bisacodyL  10 mg Rectal Daily    fluconazole (DIFLUCAN) IVPB  400 mg Intravenous Q24H    heparin (porcine)  5,000 Units Subcutaneous Q8H    levothyroxine  40 mcg Intravenous Daily    lipid (SMOFLIPID)  250 mL Intravenous Daily    meropenem (MERREM) IVPB  1 g Intravenous Q8H    pantoprazole  40 mg Intravenous Daily    sodium chloride 0.9%  10 mL Intravenous Q6H     PRN Meds:acetaminophen, albuterol-ipratropium, calcium gluconate IVPB, calcium gluconate IVPB, calcium gluconate IVPB, dextrose 50%, dextrose 50%, glucagon (human recombinant), HYDROmorphone, insulin aspart U-100, iohexol, labetaloL, magnesium sulfate IVPB, magnesium sulfate IVPB, ondansetron, potassium chloride in water **AND** potassium chloride in water **AND** potassium chloride in water, promethazine, sodium chloride 0.9%, Flushing PICC Protocol **AND** sodium chloride 0.9% **AND** sodium chloride 0.9%, sodium phosphate IVPB, sodium phosphate IVPB, sodium phosphate IVPB     Review of patient's allergies indicates:   Allergen  Reactions    Codeine Itching     Other reaction(s): Itching    Lipitor [atorvastatin] Other (See Comments)     Other reaction(s): Muscle pain  Muscle cranmps    Morphine Itching     Other reaction(s): nausea and vomiting     Zoloft [sertraline] Other (See Comments)     Tremors/muscle spasms     Objective:     Vital Signs (Most Recent):  Temp: 99.3 °F (37.4 °C) (10/02/20 1100)  Pulse: 100 (10/02/20 1200)  Resp: 16 (10/02/20 1200)  BP: (!) 155/77 (10/02/20 1200)  SpO2: 95 % (10/02/20 1200) Vital Signs (24h Range):  Temp:  [99.3 °F (37.4 °C)-100.3 °F (37.9 °C)] 99.3 °F (37.4 °C)  Pulse:  [] 100  Resp:  [13-36] 16  SpO2:  [91 %-98 %] 95 %  BP: (144-177)/(64-89) 155/77     Weight: 67.8 kg (149 lb 7.6 oz)  Body mass index is 30.19 kg/m².    Intake/Output - Last 3 Shifts       09/30 0700 - 10/01 0659 10/01 0700 - 10/02 0659 10/02 0700 - 10/03 0659    I.V. (mL/kg) 438 (6.5) 974 (14.4)     IV Piggyback  355     TPN 1592 1568     Total Intake(mL/kg) 2030 (29.9) 2897 (42.7)     Urine (mL/kg/hr) 2800 (1.7) 2815 (1.7) 1175 (3.4)    Drains 204 197 5    Other 10 100 50    Stool 0 0     Total Output 3014 3112 1230    Net -984 -215 -1230           Stool Occurrence 1 x 1 x           Physical Exam  Vitals signs and nursing note reviewed.   Constitutional:       General: She is not in acute distress.  HENT:      Head: Normocephalic and atraumatic.      Nose:      Comments: NG tube in place     Mouth/Throat:      Mouth: Mucous membranes are dry.   Eyes:      General: No scleral icterus.  Neck:      Comments: RIJ central line  Cardiovascular:      Rate and Rhythm: Normal rate and regular rhythm.      Heart sounds: Normal heart sounds.   Pulmonary:      Effort: Pulmonary effort is normal. No respiratory distress.      Breath sounds: Rhonchi: bilateral.   Abdominal:      General: There is distension.      Palpations: Abdomen is soft.      Comments: Midline incision with wound vac in place; excellent seal to wound vac.   L abd  drains in place with mild serous output; G tube to gravity.   Genitourinary:     Comments: L groin a line removed, now with drainage from insertion site  Bhardwaj in place  Skin:     General: Skin is warm and dry.      Coloration: Skin is not jaundiced.   Neurological:      General: No focal deficit present.      Mental Status: She is alert and oriented to person, place, and time.      Cranial Nerves: No cranial nerve deficit.      Sensory: No sensory deficit.      Motor: No weakness.   Psychiatric:         Mood and Affect: Mood normal.         Behavior: Behavior normal.         Significant Labs:  CBC:   Recent Labs   Lab 10/02/20  0352   WBC 9.76   RBC 2.84*   HGB 8.0*   HCT 25.9*      MCV 91   MCH 28.2   MCHC 30.9*     CMP:   Recent Labs   Lab 10/02/20  0352      CALCIUM 8.4*   ALBUMIN 1.8*   PROT 5.6*      K 3.8   CO2 26      BUN 28*   CREATININE 0.6   ALKPHOS 365*   ALT 21   AST 45*   BILITOT 5.8*       Significant Diagnostics:  I have reviewed all pertinent imaging results/findings within the past 24 hours.    Assessment/Plan:     * Perforated abdominal viscus  Neuro:  - sedation: none  - analgesia: Tylenol suppository. Dilauded PRN  - Stroke symptoms 9/25. Improved currently.  CT head obtained. Vascular neurology consulted.     CV:   - Hypotensive requiring fluid resuscitation and low-dose pressors intermittently  - Currently off pressors  - IV labetalol PRN  - femoral a-line placed 9/26 was removed due to dysfunction   - Strict monitoring of BP w/ cuff    Pulm:   - Extubated 9/22.  Reintubated 9/25.  Extubated 9/29.    - 5L NC; titrate supplemental O2 to maintain sats >92%  - CPT   - Duonebs Q6 PRN     FEN/GI: Perforated stomach; s/p washout and patch (9/4); abdominal closure (9/6)  - s/p ex-lap on 9/19   - continue to hold tube feeds at this time  - G tube, and L abdominal drains in place.  - G tube upsized  - Lactulose discontinued (9/14) in setting of normal ammonia level (9/10) and  improving mental status  - Daily metabolic panel with PRN repletion of electrolytes  - pantoprazole for ulcer ppx  - daily suppository  - Cont TPN  - alk phos, Tbili elevated; pt w/ remote history of liver transplant.  CTM     Renal:  - Continue to monitor with daily metabolic labs  - nephrology on board, appreciate recs  - Bun/Cr 28/0.6 today; continues to improve  - Bhardwaj in place; monitoring I/Os  - Lasix today     HEME/ID:   - Afebrile  - H/H stable.  - Daily CBC  - s/p Liver transplant in 2002; Daily tacrolimus level with AM labs. Per hepatology, continue tacrolimus 1mg BID  - heparin for DVT ppx  - Abdominal culture grew Klebsiella pneumonia; sensitive to zosyn, & Candida parapsilosis.  - ABX regimen meropenum, and diflucan, will re-consult ID for recommendations on continuing. Will continue current regimen pending imaging.  - CT A/P today to evaluate for possible collections    Endo:  - Synthroid 40mcg daily  - insulin aspart for blood glucose control    Dispo:  - OK for transfer to [facility] today         Elida Haley MD  Critical Care - Surgery  [facility]

## 2020-10-02 NOTE — PLAN OF CARE
Plan of care reviewed with pt, who verbalized understanding. Pt received from SICU this afternoon. Abd ML w/ woundvac in place, 2 left sided abd KERRY drains w/ cloudy serous drainage. KERRY & fem line takedown sites w/ pouches in place - clear yellow drainage. G-tube to gravity w/ yellow drainage. TPN infusing per MAR. NPO - drinking contrast for CT. Bhardwaj in place w/ joão urine. Call bell in reach, bed locked in lowest position. Frequent rounds made for pt safety. Pt is disoriented to time, otherwise AAO. VSS, will continue to monitor.

## 2020-10-02 NOTE — ASSESSMENT & PLAN NOTE
50 y.o. female w/ free air on CT scan. S/p emergent ex lap on 9/4 w/ findings of gastric perforation, primary repair. Now w/  abdominal closure on 9/6, skin stapled closed, KERRY drains removed. Class A to OR on 9/18 for free air and extrav or oral contrast seen on CT. CT on 9/25 redemonstrated contrast leaking from stomach, but well-drained with surgical drain.    Continue SICU care, appreciate their assistance  No operative intervention for gastric leaking at this time. Leak controlled with elian drain. Tissue is so friable that repeat operative intervention around the G tube will not provide a long-term solution.   G tube and NG to gravity/suction  Wound vac changes w/ wound care  Patient previously made DNR but since extubated, is now full code.

## 2020-10-03 LAB
ALBUMIN SERPL BCP-MCNC: 1.8 G/DL (ref 3.5–5.2)
ALP SERPL-CCNC: 504 U/L (ref 55–135)
ALT SERPL W/O P-5'-P-CCNC: 27 U/L (ref 10–44)
ANION GAP SERPL CALC-SCNC: 11 MMOL/L (ref 8–16)
AST SERPL-CCNC: 53 U/L (ref 10–40)
BASOPHILS # BLD AUTO: 0.06 K/UL (ref 0–0.2)
BASOPHILS NFR BLD: 0.6 % (ref 0–1.9)
BILIRUB SERPL-MCNC: 5.5 MG/DL (ref 0.1–1)
BUN SERPL-MCNC: 28 MG/DL (ref 6–20)
CALCIUM SERPL-MCNC: 8.7 MG/DL (ref 8.7–10.5)
CHLORIDE SERPL-SCNC: 110 MMOL/L (ref 95–110)
CO2 SERPL-SCNC: 23 MMOL/L (ref 23–29)
CREAT SERPL-MCNC: 0.6 MG/DL (ref 0.5–1.4)
DIFFERENTIAL METHOD: ABNORMAL
EOSINOPHIL # BLD AUTO: 0.4 K/UL (ref 0–0.5)
EOSINOPHIL NFR BLD: 4.3 % (ref 0–8)
ERYTHROCYTE [DISTWIDTH] IN BLOOD BY AUTOMATED COUNT: 17.3 % (ref 11.5–14.5)
EST. GFR  (AFRICAN AMERICAN): >60 ML/MIN/1.73 M^2
EST. GFR  (NON AFRICAN AMERICAN): >60 ML/MIN/1.73 M^2
GLUCOSE SERPL-MCNC: 99 MG/DL (ref 70–110)
HCT VFR BLD AUTO: 25.4 % (ref 37–48.5)
HGB BLD-MCNC: 7.8 G/DL (ref 12–16)
IMM GRANULOCYTES # BLD AUTO: 0.17 K/UL (ref 0–0.04)
IMM GRANULOCYTES NFR BLD AUTO: 1.8 % (ref 0–0.5)
LYMPHOCYTES # BLD AUTO: 1 K/UL (ref 1–4.8)
LYMPHOCYTES NFR BLD: 10.5 % (ref 18–48)
MAGNESIUM SERPL-MCNC: 1.8 MG/DL (ref 1.6–2.6)
MCH RBC QN AUTO: 28.1 PG (ref 27–31)
MCHC RBC AUTO-ENTMCNC: 30.7 G/DL (ref 32–36)
MCV RBC AUTO: 91 FL (ref 82–98)
MONOCYTES # BLD AUTO: 1.5 K/UL (ref 0.3–1)
MONOCYTES NFR BLD: 15.7 % (ref 4–15)
NEUTROPHILS # BLD AUTO: 6.4 K/UL (ref 1.8–7.7)
NEUTROPHILS NFR BLD: 67.1 % (ref 38–73)
NRBC BLD-RTO: 0 /100 WBC
PHOSPHATE SERPL-MCNC: 3.4 MG/DL (ref 2.7–4.5)
PLATELET # BLD AUTO: 196 K/UL (ref 150–350)
PMV BLD AUTO: 12.2 FL (ref 9.2–12.9)
POCT GLUCOSE: 101 MG/DL (ref 70–110)
POCT GLUCOSE: 113 MG/DL (ref 70–110)
POCT GLUCOSE: 126 MG/DL (ref 70–110)
POCT GLUCOSE: 99 MG/DL (ref 70–110)
POTASSIUM SERPL-SCNC: 3.8 MMOL/L (ref 3.5–5.1)
PROT SERPL-MCNC: 5.8 G/DL (ref 6–8.4)
RBC # BLD AUTO: 2.78 M/UL (ref 4–5.4)
SODIUM SERPL-SCNC: 144 MMOL/L (ref 136–145)
TACROLIMUS BLD-MCNC: 3.9 NG/ML (ref 5–15)
WBC # BLD AUTO: 9.52 K/UL (ref 3.9–12.7)

## 2020-10-03 PROCEDURE — 83735 ASSAY OF MAGNESIUM: CPT

## 2020-10-03 PROCEDURE — 63600175 PHARM REV CODE 636 W HCPCS: Performed by: STUDENT IN AN ORGANIZED HEALTH CARE EDUCATION/TRAINING PROGRAM

## 2020-10-03 PROCEDURE — A4216 STERILE WATER/SALINE, 10 ML: HCPCS | Performed by: SURGERY

## 2020-10-03 PROCEDURE — 99233 PR SUBSEQUENT HOSPITAL CARE,LEVL III: ICD-10-PCS | Mod: ,,, | Performed by: INTERNAL MEDICINE

## 2020-10-03 PROCEDURE — 93010 EKG 12-LEAD: ICD-10-PCS | Mod: ,,, | Performed by: INTERNAL MEDICINE

## 2020-10-03 PROCEDURE — 25000003 PHARM REV CODE 250: Performed by: SURGERY

## 2020-10-03 PROCEDURE — 99900035 HC TECH TIME PER 15 MIN (STAT)

## 2020-10-03 PROCEDURE — 25000003 PHARM REV CODE 250: Performed by: STUDENT IN AN ORGANIZED HEALTH CARE EDUCATION/TRAINING PROGRAM

## 2020-10-03 PROCEDURE — 99233 SBSQ HOSP IP/OBS HIGH 50: CPT | Mod: ,,, | Performed by: INTERNAL MEDICINE

## 2020-10-03 PROCEDURE — B4185 PARENTERAL SOL 10 GM LIPIDS: HCPCS | Performed by: STUDENT IN AN ORGANIZED HEALTH CARE EDUCATION/TRAINING PROGRAM

## 2020-10-03 PROCEDURE — 27000221 HC OXYGEN, UP TO 24 HOURS

## 2020-10-03 PROCEDURE — 93005 ELECTROCARDIOGRAM TRACING: CPT

## 2020-10-03 PROCEDURE — 84100 ASSAY OF PHOSPHORUS: CPT

## 2020-10-03 PROCEDURE — 20600001 HC STEP DOWN PRIVATE ROOM

## 2020-10-03 PROCEDURE — 93010 ELECTROCARDIOGRAM REPORT: CPT | Mod: ,,, | Performed by: INTERNAL MEDICINE

## 2020-10-03 PROCEDURE — A4217 STERILE WATER/SALINE, 500 ML: HCPCS | Performed by: STUDENT IN AN ORGANIZED HEALTH CARE EDUCATION/TRAINING PROGRAM

## 2020-10-03 PROCEDURE — 85025 COMPLETE CBC W/AUTO DIFF WBC: CPT

## 2020-10-03 PROCEDURE — 80197 ASSAY OF TACROLIMUS: CPT

## 2020-10-03 PROCEDURE — 63600175 PHARM REV CODE 636 W HCPCS: Performed by: SURGERY

## 2020-10-03 PROCEDURE — 94761 N-INVAS EAR/PLS OXIMETRY MLT: CPT

## 2020-10-03 PROCEDURE — C9113 INJ PANTOPRAZOLE SODIUM, VIA: HCPCS | Performed by: STUDENT IN AN ORGANIZED HEALTH CARE EDUCATION/TRAINING PROGRAM

## 2020-10-03 PROCEDURE — 80053 COMPREHEN METABOLIC PANEL: CPT

## 2020-10-03 RX ADMIN — MAGNESIUM SULFATE HEPTAHYDRATE: 500 INJECTION, SOLUTION INTRAMUSCULAR; INTRAVENOUS at 10:10

## 2020-10-03 RX ADMIN — SMOFLIPID 250 ML: 6; 6; 5; 3 INJECTION, EMULSION INTRAVENOUS at 10:10

## 2020-10-03 RX ADMIN — PANTOPRAZOLE SODIUM 40 MG: 40 INJECTION, POWDER, LYOPHILIZED, FOR SOLUTION INTRAVENOUS at 08:10

## 2020-10-03 RX ADMIN — HYDROMORPHONE HYDROCHLORIDE 0.5 MG: 1 INJECTION, SOLUTION INTRAMUSCULAR; INTRAVENOUS; SUBCUTANEOUS at 08:10

## 2020-10-03 RX ADMIN — HEPARIN SODIUM 5000 UNITS: 5000 INJECTION INTRAVENOUS; SUBCUTANEOUS at 10:10

## 2020-10-03 RX ADMIN — ONDANSETRON 4 MG: 2 INJECTION INTRAMUSCULAR; INTRAVENOUS at 11:10

## 2020-10-03 RX ADMIN — Medication 10 ML: at 11:10

## 2020-10-03 RX ADMIN — FLUCONAZOLE 400 MG: 2 INJECTION, SOLUTION INTRAVENOUS at 04:10

## 2020-10-03 RX ADMIN — HYDROMORPHONE HYDROCHLORIDE 0.5 MG: 1 INJECTION, SOLUTION INTRAMUSCULAR; INTRAVENOUS; SUBCUTANEOUS at 11:10

## 2020-10-03 RX ADMIN — HYDROMORPHONE HYDROCHLORIDE 0.5 MG: 1 INJECTION, SOLUTION INTRAMUSCULAR; INTRAVENOUS; SUBCUTANEOUS at 06:10

## 2020-10-03 RX ADMIN — HYDROMORPHONE HYDROCHLORIDE 0.5 MG: 1 INJECTION, SOLUTION INTRAMUSCULAR; INTRAVENOUS; SUBCUTANEOUS at 12:10

## 2020-10-03 RX ADMIN — Medication 10 ML: at 06:10

## 2020-10-03 RX ADMIN — HEPARIN SODIUM 5000 UNITS: 5000 INJECTION INTRAVENOUS; SUBCUTANEOUS at 03:10

## 2020-10-03 RX ADMIN — ONDANSETRON 4 MG: 2 INJECTION INTRAMUSCULAR; INTRAVENOUS at 08:10

## 2020-10-03 RX ADMIN — MEROPENEM AND SODIUM CHLORIDE 1 G: 1 INJECTION, SOLUTION INTRAVENOUS at 08:10

## 2020-10-03 RX ADMIN — LEVOTHYROXINE SODIUM ANHYDROUS 40 MCG: 100 INJECTION, POWDER, LYOPHILIZED, FOR SOLUTION INTRAVENOUS at 08:10

## 2020-10-03 RX ADMIN — Medication 10 ML: at 05:10

## 2020-10-03 RX ADMIN — MEROPENEM AND SODIUM CHLORIDE 1 G: 1 INJECTION, SOLUTION INTRAVENOUS at 03:10

## 2020-10-03 RX ADMIN — HYDROMORPHONE HYDROCHLORIDE 0.5 MG: 1 INJECTION, SOLUTION INTRAMUSCULAR; INTRAVENOUS; SUBCUTANEOUS at 04:10

## 2020-10-03 RX ADMIN — MEROPENEM AND SODIUM CHLORIDE 1 G: 1 INJECTION, SOLUTION INTRAVENOUS at 10:10

## 2020-10-03 RX ADMIN — HEPARIN SODIUM 5000 UNITS: 5000 INJECTION INTRAVENOUS; SUBCUTANEOUS at 06:10

## 2020-10-03 NOTE — PLAN OF CARE
Plan of care reviewed with pt, who verbalized understanding. Abd ML w/ woundvac in place, 2 left sided abd KERRY drains w/ cloudy serous drainage. KERRY & fem line takedown sites w/ pouches in place - clear yellow drainage. G-tube to gravity w/ yellow drainage. Pt w/ moderate to severe edema to B/L hands, lower extremities. TPN infusing per MAR. NPO. Purewick in place w/ joão urine. Call bell in reach, bed locked in lowest position. Frequent rounds made for pt safety. Pt is disoriented to time, otherwise AAO. VSS, will continue to monitor.

## 2020-10-03 NOTE — PROGRESS NOTES
[facility]  General Surgery  Progress Note    Subjective:     History of Present Illness:  Ms. Hernandez is a 49yo F w/PMH of von Gierke disease s/p liver transplant (2002, on tacro + MMF, follows Dr. Nielsen), hx chronic rejection (bx 2015 with acute and chronic rejection s/p steroids), biliary stricture and ductopenic rejection, recently with cirrhosis on fibroscan (10/2019), HTN, and depression who presents as a transfer for further evaluation of gastric outlet obstruction and esophageal stricturing.  Patient decompensated requiring multiple pressors and intubation. CT scan showed free air. Prior to intubation patient reported severe abdominal pain.   states that patient has had epigastric pain, nausea, and vomiting for several days.     Post-Op Info:  Procedure(s) (LRB):  LAPAROTOMY, EXPLORATORY, DRAINAGE OF ABSCESS, REPAIR OF GASTRIC PERFORATION, GASTROSTOMY TUBE PLACEMENT (N/A)  APPLICATION, WOUND VAC (N/A)   15 Days Post-Op     Interval History: Stepped down out of the ICU.  In good spirits this AM    Medications:  Continuous Infusions:   TPN ADULT CENTRAL LINE CUSTOM 60 mL/hr at 10/02/20 2306    TPN ADULT CENTRAL LINE CUSTOM       Scheduled Meds:   bisacodyL  10 mg Rectal Daily    fluconazole (DIFLUCAN) IVPB  400 mg Intravenous Q24H    heparin (porcine)  5,000 Units Subcutaneous Q8H    levothyroxine  40 mcg Intravenous Daily    lipid (SMOFLIPID)  250 mL Intravenous Daily    meropenem (MERREM) IVPB  1 g Intravenous Q8H    pantoprazole  40 mg Intravenous Daily    sodium chloride 0.9%  10 mL Intravenous Q6H     PRN Meds:acetaminophen, albuterol-ipratropium, calcium gluconate IVPB, calcium gluconate IVPB, calcium gluconate IVPB, dextrose 50%, dextrose 50%, glucagon (human recombinant), HYDROmorphone, insulin aspart U-100, iohexol, labetaloL, magnesium sulfate IVPB, magnesium sulfate IVPB, ondansetron, potassium chloride in water **AND** potassium chloride in water **AND**  potassium chloride in water, promethazine, sodium chloride 0.9%, Flushing PICC Protocol **AND** sodium chloride 0.9% **AND** sodium chloride 0.9%, sodium phosphate IVPB, sodium phosphate IVPB, sodium phosphate IVPB     Review of patient's allergies indicates:   Allergen Reactions    Codeine Itching     Other reaction(s): Itching    Lipitor [atorvastatin] Other (See Comments)     Other reaction(s): Muscle pain  Muscle cranmps    Morphine Itching     Other reaction(s): nausea and vomiting     Zoloft [sertraline] Other (See Comments)     Tremors/muscle spasms     Objective:     Vital Signs (Most Recent):  Temp: 99.8 °F (37.7 °C) (10/03/20 0715)  Pulse: 105 (10/03/20 0715)  Resp: 20 (10/03/20 0715)  BP: (!) 162/76 (10/03/20 0715)  SpO2: 96 % (10/03/20 0715) Vital Signs (24h Range):  Temp:  [98.6 °F (37 °C)-99.9 °F (37.7 °C)] 99.8 °F (37.7 °C)  Pulse:  [] 105  Resp:  [13-26] 20  SpO2:  [85 %-99 %] 96 %  BP: (133-162)/(68-86) 162/76     Weight: 67.8 kg (149 lb 7.6 oz)  Body mass index is 30.19 kg/m².    Intake/Output - Last 3 Shifts       10/01 0700 - 10/02 0659 10/02 0700 - 10/03 0659 10/03 0700 - 10/04 0659    P.O.  450     I.V. (mL/kg) 974 (14.4)      IV Piggyback 355 100     TPN 1568 859.7     Total Intake(mL/kg) 2897 (42.7) 1409.7 (20.8)     Urine (mL/kg/hr) 2815 (1.7) 3680 (2.3) 200 (1.8)    Drains 197 35     Other 100 50     Stool 0 0     Total Output 3112 3765 200    Net -215 -2355.3 -200           Stool Occurrence 1 x 0 x           Physical Exam  Vitals signs and nursing note reviewed.   Constitutional:       Appearance: She is ill-appearing.   HENT:      Head: Normocephalic and atraumatic.      Mouth/Throat:      Mouth: Mucous membranes are dry.   Eyes:      General: No scleral icterus.  Cardiovascular:      Rate and Rhythm: Normal rate and regular rhythm.      Heart sounds: Normal heart sounds.   Abdominal:      General: There is distension.      Palpations: Abdomen is soft.      Comments: Midline  incision with wound vac in place; excellent seal to wound vac.   L abd drains in place with mild serous output; G tube to gravity.   Skin:     General: Skin is warm and dry.      Coloration: Skin is not jaundiced.   Neurological:      Mental Status: She is alert.      Cranial Nerves: No cranial nerve deficit.      Sensory: No sensory deficit.      Motor: No weakness.         Significant Labs:  CBC:   Recent Labs   Lab 10/03/20  0400   WBC 9.52   RBC 2.78*   HGB 7.8*   HCT 25.4*      MCV 91   MCH 28.1   MCHC 30.7*     CMP:   Recent Labs   Lab 10/03/20  0400   GLU 99   CALCIUM 8.7   ALBUMIN 1.8*   PROT 5.8*      K 3.8   CO2 23      BUN 28*   CREATININE 0.6   ALKPHOS 504*   ALT 27   AST 53*   BILITOT 5.5*       Significant Diagnostics:  I have reviewed all pertinent imaging results/findings within the past 24 hours.    Assessment/Plan:     * Perforated abdominal viscus  50 y.o. female w/ free air on CT scan. S/p emergent ex lap on 9/4 w/ findings of gastric perforation, primary repair. Now w/  abdominal closure on 9/6, skin stapled closed, KERRY drains removed. Class A to OR on 9/18 for free air and extrav or oral contrast seen on CT. CT on 9/25 redemonstrated contrast leaking from stomach, but well-drained with surgical drain.    NPO  No operative intervention for gastric leaking at this time. Leak controlled with elian drain. Tissue is so friable that repeat operative intervention around the G tube will not provide a long-term solution.   G tube and NG to gravity/suction  Wound vac changes w/ wound care  TPN  PT/OT        Jhony Chappell MD  General Surgery  [facility]

## 2020-10-03 NOTE — SUBJECTIVE & OBJECTIVE
Interval History: Stepped down out of the ICU.  In good spirits this AM    Medications:  Continuous Infusions:   TPN ADULT CENTRAL LINE CUSTOM 60 mL/hr at 10/02/20 2306    TPN ADULT CENTRAL LINE CUSTOM       Scheduled Meds:   bisacodyL  10 mg Rectal Daily    fluconazole (DIFLUCAN) IVPB  400 mg Intravenous Q24H    heparin (porcine)  5,000 Units Subcutaneous Q8H    levothyroxine  40 mcg Intravenous Daily    lipid (SMOFLIPID)  250 mL Intravenous Daily    meropenem (MERREM) IVPB  1 g Intravenous Q8H    pantoprazole  40 mg Intravenous Daily    sodium chloride 0.9%  10 mL Intravenous Q6H     PRN Meds:acetaminophen, albuterol-ipratropium, calcium gluconate IVPB, calcium gluconate IVPB, calcium gluconate IVPB, dextrose 50%, dextrose 50%, glucagon (human recombinant), HYDROmorphone, insulin aspart U-100, iohexol, labetaloL, magnesium sulfate IVPB, magnesium sulfate IVPB, ondansetron, potassium chloride in water **AND** potassium chloride in water **AND** potassium chloride in water, promethazine, sodium chloride 0.9%, Flushing PICC Protocol **AND** sodium chloride 0.9% **AND** sodium chloride 0.9%, sodium phosphate IVPB, sodium phosphate IVPB, sodium phosphate IVPB     Review of patient's allergies indicates:   Allergen Reactions    Codeine Itching     Other reaction(s): Itching    Lipitor [atorvastatin] Other (See Comments)     Other reaction(s): Muscle pain  Muscle cranmps    Morphine Itching     Other reaction(s): nausea and vomiting     Zoloft [sertraline] Other (See Comments)     Tremors/muscle spasms     Objective:     Vital Signs (Most Recent):  Temp: 99.8 °F (37.7 °C) (10/03/20 0715)  Pulse: 105 (10/03/20 0715)  Resp: 20 (10/03/20 0715)  BP: (!) 162/76 (10/03/20 0715)  SpO2: 96 % (10/03/20 0715) Vital Signs (24h Range):  Temp:  [98.6 °F (37 °C)-99.9 °F (37.7 °C)] 99.8 °F (37.7 °C)  Pulse:  [] 105  Resp:  [13-26] 20  SpO2:  [85 %-99 %] 96 %  BP: (133-162)/(68-86) 162/76     Weight: 67.8 kg (149 lb  7.6 oz)  Body mass index is 30.19 kg/m².    Intake/Output - Last 3 Shifts       10/01 0700 - 10/02 0659 10/02 0700 - 10/03 0659 10/03 0700 - 10/04 0659    P.O.  450     I.V. (mL/kg) 974 (14.4)      IV Piggyback 355 100     TPN 1568 859.7     Total Intake(mL/kg) 2897 (42.7) 1409.7 (20.8)     Urine (mL/kg/hr) 2815 (1.7) 3680 (2.3) 200 (1.8)    Drains 197 35     Other 100 50     Stool 0 0     Total Output 3112 3765 200    Net -215 -2355.3 -200           Stool Occurrence 1 x 0 x           Physical Exam  Vitals signs and nursing note reviewed.   Constitutional:       Appearance: She is ill-appearing.   HENT:      Head: Normocephalic and atraumatic.      Mouth/Throat:      Mouth: Mucous membranes are dry.   Eyes:      General: No scleral icterus.  Cardiovascular:      Rate and Rhythm: Normal rate and regular rhythm.      Heart sounds: Normal heart sounds.   Abdominal:      General: There is distension.      Palpations: Abdomen is soft.      Comments: Midline incision with wound vac in place; excellent seal to wound vac.   L abd drains in place with mild serous output; G tube to gravity.   Skin:     General: Skin is warm and dry.      Coloration: Skin is not jaundiced.   Neurological:      Mental Status: She is alert.      Cranial Nerves: No cranial nerve deficit.      Sensory: No sensory deficit.      Motor: No weakness.         Significant Labs:  CBC:   Recent Labs   Lab 10/03/20  0400   WBC 9.52   RBC 2.78*   HGB 7.8*   HCT 25.4*      MCV 91   MCH 28.1   MCHC 30.7*     CMP:   Recent Labs   Lab 10/03/20  0400   GLU 99   CALCIUM 8.7   ALBUMIN 1.8*   PROT 5.8*      K 3.8   CO2 23      BUN 28*   CREATININE 0.6   ALKPHOS 504*   ALT 27   AST 53*   BILITOT 5.5*       Significant Diagnostics:  I have reviewed all pertinent imaging results/findings within the past 24 hours.

## 2020-10-03 NOTE — PROGRESS NOTES
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  Abdominal cultures with Klebsiella, Bacteroides, and yeast. CT abd/pelvis 9/25/2020 with extraluminal extravasation of ingested contrast adjacent to stomach fundus, NG tube and feeding tube protruding through gastric fundal defect, diffuse thickening of stomach, small/large bowel, bilateral pulmonary consolidative opacities. Patient with acute respiratory failure 9/25/2020 requiring intubation, concern for aspiration PNA / HAP. Due to critical illness and prolonged hospitalization, she was broadened to meropenem, vanc and micafungin on 9/24. ID signed off 9/25 as goals seemed to be shifting toward a focus on comfort, however patient has since clinically improved, is now extubated. She remains DNR. ID called back for further abx recommendations.     Abdominal cx 9/18:  Klebsiella, Bacteroides, candida parapsilosis  BCX 9/4, 10/2- NGTD    Antimicrobials:  vanc 9/25- 9/28  Ceftriaxone 9/23- 9/35; Meropenem 9/25-  Micafungin 9/25- 9/30; Fluconazole 9/30-    Recommendations:   - Continue trevor and fluconazole  - repeat CT 10/2 w/ moderate amount of loculated intra-abd fluid and inflammation  consistent w/ peritonitis  - will discuss imaging w/ surgical team, ? If further IR drain placement is needed, would help guide antimicrobial therapy    Will follow        Anticipated Disposition: UMA    Thank you for your consult. I will follow-up with patient. Please contact us if you have any additional questions.    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  Abdominal cultures with Klebsiella, Bacteroides, and yeast. CT abd/pelvis 9/25/2020 with extraluminal extravasation of ingested contrast adjacent to stomach fundus, NG tube and feeding tube protruding through gastric fundal defect, diffuse thickening of stomach, small/large bowel, bilateral pulmonary consolidative opacities.  Yesterday, patient intubated for respiratory failure, on vent FiO2 100%, PEEP 5. No pressor requirements.    ID called back on 10/1 for re-evaluation and recommendations on abx duration. No new abd cultures available since 9/18 (c. Parapsilosis, bacteroides, kleb pneumo). Patient has multiple drains and a wound vac in place. She is extubated and denies significant abd pain (states recently given pain med), but is tired during exam. Surgical notes reviewed, noted due to friable nature of gastric tissue, perforation is currently being controlled w/ percutaneous drainage, G-tube to drainage. Patient is on TPN. Fever of 100.7 today. WBC stable at 13. Remains on  trevor, vanc and fluconazole.     Last CT 9/25:  Impression:     In this patient with recent gastric ulcer perforation status post surgical repair, there is extraluminal extravasation of ingested contrast adjacent to the stomach fundus, in a similar location of prior perforation.  Small volume adjacent free intraperitoneal air.  Nasogastric tube and feeding tube appear to protrude through a gastric fundal defect.  Percutaneous drainage catheter in the left abdomen.     Diffuse thickening of the stomach, small and large bowel, worse since prior exam.  These findings are nonspecific and could be related to diffuse edema, infection, inflammation, or ischemia.     Bilateral pulmonary consolidative opacities, worse since prior.  Improved bilateral pleural effusions.     Significant narrowing of the celiac axis origin (sagittal series 602, image 99), however patent distally. This was not seen on prior contrast study dated 09/03/2020 .         Interval History: no events overnight, transferred out of ICU to floor. CT findings reviewed    Review of Systems   Constitutional: Negative for activity change, appetite change, chills, fever and unexpected weight change.   HENT: Negative for dental problem, ear discharge, ear pain, mouth sores, sinus pain, sore throat and trouble swallowing.    Eyes: Negative for pain and discharge.   Respiratory: Negative for cough, chest tightness, shortness of breath and wheezing.    Cardiovascular: Negative for chest pain and leg swelling.   Gastrointestinal: Positive for abdominal pain. Negative for abdominal distention, constipation, diarrhea, nausea and vomiting.   Genitourinary: Negative for difficulty urinating, dysuria, flank pain, frequency, genital sores and hematuria.   Musculoskeletal: Negative for arthralgias, joint swelling, neck pain and neck stiffness.   Skin: Negative for color change, rash and wound.   Neurological: Negative for dizziness, weakness, light-headedness, numbness and  headaches.   Psychiatric/Behavioral: Negative for confusion. The patient is not nervous/anxious.      Objective:     Vital Signs (Most Recent):  Temp: 100 °F (37.8 °C) (10/03/20 1156)  Pulse: 103 (10/03/20 1156)  Resp: 20 (10/03/20 1206)  BP: (!) 143/67 (10/03/20 1156)  SpO2: 98 % (10/03/20 1156) Vital Signs (24h Range):  Temp:  [98.6 °F (37 °C)-100 °F (37.8 °C)] 100 °F (37.8 °C)  Pulse:  [] 103  Resp:  [16-22] 20  SpO2:  [91 %-99 %] 98 %  BP: (133-162)/(67-76) 143/67     Weight: 67.8 kg (149 lb 7.6 oz)  Body mass index is 30.19 kg/m².    Estimated Creatinine Clearance: 117.4 mL/min (based on SCr of 0.6 mg/dL).    Physical Exam  Vitals signs reviewed.   Constitutional:       General: She is not in acute distress.     Appearance: She is well-developed. She is ill-appearing. She is not diaphoretic.   HENT:      Head: Atraumatic.      Right Ear: External ear normal.      Left Ear: External ear normal.      Nose: Nose normal. No congestion or rhinorrhea.      Mouth/Throat:      Mouth: Mucous membranes are moist.      Pharynx: Oropharynx is clear. No oropharyngeal exudate or posterior oropharyngeal erythema.   Eyes:      General: No scleral icterus.        Right eye: No discharge.         Left eye: No discharge.      Extraocular Movements: Extraocular movements intact.      Conjunctiva/sclera: Conjunctivae normal.   Neck:      Musculoskeletal: Normal range of motion and neck supple.   Cardiovascular:      Rate and Rhythm: Normal rate and regular rhythm.      Pulses: Normal pulses.      Heart sounds: Normal heart sounds. No murmur.   Pulmonary:      Effort: Pulmonary effort is normal. No respiratory distress.      Breath sounds: Normal breath sounds. No wheezing.   Abdominal:      General: Bowel sounds are normal. There is no distension.      Palpations: Abdomen is soft.      Tenderness: There is no abdominal tenderness.      Comments: Mild distention, wound vac in place to midline incision with good seal, L sided  abd drains in place w/ serous output, G-tube to gravity w/ cloudy serous output. L groin collection bag at site of prior A line, serous drainage present. Minimal tenderness to palpation.    Musculoskeletal: Normal range of motion.         General: No swelling or deformity.      Right lower leg: Edema present.      Left lower leg: Edema present.      Comments: R arm PICC line   Skin:     General: Skin is warm and dry.      Findings: No erythema or rash.   Neurological:      General: No focal deficit present.      Mental Status: She is alert and oriented to person, place, and time. Mental status is at baseline.      Cranial Nerves: No cranial nerve deficit.      Coordination: Coordination normal.   Psychiatric:         Mood and Affect: Mood normal.         Behavior: Behavior normal.         Thought Content: Thought content normal.         Judgment: Judgment normal.         Significant Labs:   CBC:   Recent Labs   Lab 10/02/20  0352 10/03/20  0400   WBC 9.76 9.52   HGB 8.0* 7.8*   HCT 25.9* 25.4*    196     CMP:   Recent Labs   Lab 10/02/20  0352 10/03/20  0400    144   K 3.8 3.8    110   CO2 26 23    99   BUN 28* 28*   CREATININE 0.6 0.6   CALCIUM 8.4* 8.7   PROT 5.6* 5.8*   ALBUMIN 1.8* 1.8*   BILITOT 5.8* 5.5*   ALKPHOS 365* 504*   AST 45* 53*   ALT 21 27   ANIONGAP 7* 11   EGFRNONAA >60.0 >60.0     Microbiology Results (last 7 days)     Procedure Component Value Units Date/Time    Blood culture [[id number]] Collected: 10/02/20 0220    Order Status: Completed Specimen: Blood from Peripheral, Antecubital, Left Updated: 10/03/20 0613     Blood Culture, Routine No Growth to date      No Growth to date    Blood culture [[id number]]     Order Status: Canceled Specimen: Blood     Fungus culture [[id number]]  (Abnormal) Collected: 09/18/20 1544    Order Status: Completed Specimen: Abscess from Abdomen Updated: 09/29/20 0652     Fungus (Mycology) Culture CANDIDA PARAPSILOSIS  Rare      Narrative:       1. Inter-Abdominal Abscess for cultures  2. Inter-Abdominal Abscess for cultures    Culture, VAP (BAL) [[id number]] Collected: 09/25/20 1238    Order Status: Completed Specimen: Bronchial Alveolar Lavage from BAL, LLL Updated: 09/28/20 0831     VAP BAL CULTURE Normal respiratory ted     Gram Stain (Respiratory) <10 epithelial cells per low power field.     Gram Stain (Respiratory) Rare WBC's     Gram Stain (Respiratory) No organisms seen          Significant Imaging: I have reviewed all pertinent imaging results/findings within the past 24 hours.     CT A/P 10/2/2020:  Impression:     1. Postoperative change of exploratory laparotomy for gastric perforation repair.  Percutaneous gastrostomy tube in place with diffuse gastric wall thickening/edema.  No oral contrast is within the gastric lumen limiting evaluation for residual/recurrent gastric leak.  Further evaluation as warranted clinically.  2. Persistent moderate volume of peritoneal fluid, some of which appears loculated with peritoneal thickening and diffuse mesenteric stranding/haziness.  Findings concerning for possible component of superimposed peritonitis.  Stably positioned left-sided percutaneous drainage catheters in place.  3. Marked diffuse colonic wall thickening as well small bowel wall thickening.  Findings could be reactive in etiology or represent superimposed component of enterocolitis.  4. Marked narrowing involving the celiac axis origin, similar to prior exam.  5. Small bilateral pleural effusions with bibasilar atelectatic/consolidative change.  6. Postoperative change of orthotopic liver transplant.  7. Additional findings as above.

## 2020-10-03 NOTE — NURSING
Pt resting comfortably in bed this am, tele monitor alarming for an episode of asymptomatic HR in the 40's - pt normally runs 90's-100's. RN informed surgical on-call - told RN to continue monitoring HR through the AM, obtain an EKG if there is another instance of bradycardia. Pt has dropped down again into the 40's - RN ordered EKG. [facility].

## 2020-10-03 NOTE — ASSESSMENT & PLAN NOTE
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  Abdominal cultures with Klebsiella, Bacteroides, and yeast. CT abd/pelvis 9/25/2020 with extraluminal extravasation of ingested contrast adjacent to stomach fundus, NG tube and feeding tube protruding through gastric fundal defect, diffuse thickening of stomach, small/large bowel, bilateral pulmonary consolidative opacities. Patient with acute respiratory failure 9/25/2020 requiring intubation, concern for aspiration PNA / HAP. Due to critical illness and prolonged hospitalization, she was broadened to meropenem, vanc and micafungin on 9/24. ID signed off 9/25 as goals seemed to be shifting toward a focus on comfort, however patient has since clinically improved, is now extubated. She remains DNR. ID called back for further abx recommendations.     Abdominal cx 9/18:  Klebsiella, Bacteroides, candida parapsilosis  BCX 9/4, 10/2- NGTD    Antimicrobials:  vanc 9/25- 9/28  Ceftriaxone 9/23- 9/35; Meropenem 9/25-  Micafungin 9/25- 9/30; Fluconazole 9/30-    Recommendations:   - Continue trevor and fluconazole  - repeat CT 10/2 w/ moderate amount of loculated intra-abd fluid and inflammation consistent w/ peritonitis  - will discuss imaging w/ surgical team, ? If further IR drain placement is needed, would help guide antimicrobial therapy    Will follow

## 2020-10-03 NOTE — ASSESSMENT & PLAN NOTE
50 y.o. female w/ free air on CT scan. S/p emergent ex lap on 9/4 w/ findings of gastric perforation, primary repair. Now w/  abdominal closure on 9/6, skin stapled closed, KERRY drains removed. Class A to OR on 9/18 for free air and extrav or oral contrast seen on CT. CT on 9/25 redemonstrated contrast leaking from stomach, but well-drained with surgical drain.    NPO  No operative intervention for gastric leaking at this time. Leak controlled with elian drain. Tissue is so friable that repeat operative intervention around the G tube will not provide a long-term solution.   G tube and NG to gravity/suction  Wound vac changes w/ wound care  TPN  PT/OT

## 2020-10-03 NOTE — PLAN OF CARE
Plan of care reviewed with pt. Verbalized understanding. Pt AAOx4. Pt on teVS stable on 4L NC. Pain managed with PRN medicine.     Abd ML w/ WV is intact running at 125mmHg. x2 L KERRY are intact putting out yellow/tan drainage. x2 wound pouch are dry and intact putting out yellow serous drainage. Groin wound pouch changed due to leaking. Pt turned q2. Accuchecks done q4. No coverage needed.     Pt voided per mei. Adequate output. Mei pulled at 0430 due to no longer needing hourly I&Os. Pt voiding per purewick. Pt remained NPO. No complaints of nausea. Frequent rounds made for pt safety. Bed low and locked, call light in reach. WCTM

## 2020-10-03 NOTE — TREATMENT PLAN
Hepatology Treatment Plan    Elda Hernandez is a 50 y.o. female admitted to hospital 9/3/2020 (Hospital Day: 31) due to Perforated abdominal viscus. Hepatology following for immunosuppression management.    Lab Results   Component Value Date    TACROLIMUS 3.9 (L) 10/03/2020    TACROLIMUS 6.3 10/02/2020    TACROLIMUS 8.5 10/01/2020       Lab Results   Component Value Date    CREATININE 0.6 10/03/2020    CREATININE 0.6 10/02/2020    CREATININE 0.6 10/01/2020       Lab Results   Component Value Date    ALT 27 10/03/2020    AST 53 (H) 10/03/2020     (H) 04/07/2018    ALKPHOS 504 (H) 10/03/2020    BILITOT 5.5 (H) 10/03/2020    WBC 9.52 10/03/2020    HGB 7.8 (L) 10/03/2020     10/03/2020       Kidney function is improving  Liver enzymes are improving    Plan  Continue to hold Prograf    Please notify hepatology on day of discharge to discuss discharge medications and follow up.     Please obtain daily CBC, BMP, LFT, INR, immunosuppressant trough level    Thank you for involving us in the care of Elda Hernandez. Please call with any additional concerns or questions.    Fatuma Mathews MD  Gastroenterology Fellow

## 2020-10-03 NOTE — SUBJECTIVE & OBJECTIVE
Interval History: no events overnight, transferred out of ICU to floor. CT findings reviewed    Review of Systems   Constitutional: Negative for activity change, appetite change, chills, fever and unexpected weight change.   HENT: Negative for dental problem, ear discharge, ear pain, mouth sores, sinus pain, sore throat and trouble swallowing.    Eyes: Negative for pain and discharge.   Respiratory: Negative for cough, chest tightness, shortness of breath and wheezing.    Cardiovascular: Negative for chest pain and leg swelling.   Gastrointestinal: Positive for abdominal pain. Negative for abdominal distention, constipation, diarrhea, nausea and vomiting.   Genitourinary: Negative for difficulty urinating, dysuria, flank pain, frequency, genital sores and hematuria.   Musculoskeletal: Negative for arthralgias, joint swelling, neck pain and neck stiffness.   Skin: Negative for color change, rash and wound.   Neurological: Negative for dizziness, weakness, light-headedness, numbness and headaches.   Psychiatric/Behavioral: Negative for confusion. The patient is not nervous/anxious.      Objective:     Vital Signs (Most Recent):  Temp: 100 °F (37.8 °C) (10/03/20 1156)  Pulse: 103 (10/03/20 1156)  Resp: 20 (10/03/20 1206)  BP: (!) 143/67 (10/03/20 1156)  SpO2: 98 % (10/03/20 1156) Vital Signs (24h Range):  Temp:  [98.6 °F (37 °C)-100 °F (37.8 °C)] 100 °F (37.8 °C)  Pulse:  [] 103  Resp:  [16-22] 20  SpO2:  [91 %-99 %] 98 %  BP: (133-162)/(67-76) 143/67     Weight: 67.8 kg (149 lb 7.6 oz)  Body mass index is 30.19 kg/m².    Estimated Creatinine Clearance: 117.4 mL/min (based on SCr of 0.6 mg/dL).    Physical Exam  Vitals signs reviewed.   Constitutional:       General: She is not in acute distress.     Appearance: She is well-developed. She is ill-appearing. She is not diaphoretic.   HENT:      Head: Atraumatic.      Right Ear: External ear normal.      Left Ear: External ear normal.      Nose: Nose normal. No  congestion or rhinorrhea.      Mouth/Throat:      Mouth: Mucous membranes are moist.      Pharynx: Oropharynx is clear. No oropharyngeal exudate or posterior oropharyngeal erythema.   Eyes:      General: No scleral icterus.        Right eye: No discharge.         Left eye: No discharge.      Extraocular Movements: Extraocular movements intact.      Conjunctiva/sclera: Conjunctivae normal.   Neck:      Musculoskeletal: Normal range of motion and neck supple.   Cardiovascular:      Rate and Rhythm: Normal rate and regular rhythm.      Pulses: Normal pulses.      Heart sounds: Normal heart sounds. No murmur.   Pulmonary:      Effort: Pulmonary effort is normal. No respiratory distress.      Breath sounds: Normal breath sounds. No wheezing.   Abdominal:      General: Bowel sounds are normal. There is no distension.      Palpations: Abdomen is soft.      Tenderness: There is no abdominal tenderness.      Comments: Mild distention, wound vac in place to midline incision with good seal, L sided abd drains in place w/ serous output, G-tube to gravity w/ cloudy serous output. L groin collection bag at site of prior A line, serous drainage present. Minimal tenderness to palpation.    Musculoskeletal: Normal range of motion.         General: No swelling or deformity.      Right lower leg: Edema present.      Left lower leg: Edema present.      Comments: R arm PICC line   Skin:     General: Skin is warm and dry.      Findings: No erythema or rash.   Neurological:      General: No focal deficit present.      Mental Status: She is alert and oriented to person, place, and time. Mental status is at baseline.      Cranial Nerves: No cranial nerve deficit.      Coordination: Coordination normal.   Psychiatric:         Mood and Affect: Mood normal.         Behavior: Behavior normal.         Thought Content: Thought content normal.         Judgment: Judgment normal.         Significant Labs:   CBC:   Recent Labs   Lab 10/02/20  0484  10/03/20  0400   WBC 9.76 9.52   HGB 8.0* 7.8*   HCT 25.9* 25.4*    196     CMP:   Recent Labs   Lab 10/02/20  0352 10/03/20  0400    144   K 3.8 3.8    110   CO2 26 23    99   BUN 28* 28*   CREATININE 0.6 0.6   CALCIUM 8.4* 8.7   PROT 5.6* 5.8*   ALBUMIN 1.8* 1.8*   BILITOT 5.8* 5.5*   ALKPHOS 365* 504*   AST 45* 53*   ALT 21 27   ANIONGAP 7* 11   EGFRNONAA >60.0 >60.0     Microbiology Results (last 7 days)     Procedure Component Value Units Date/Time    Blood culture [[id number]] Collected: 10/02/20 0220    Order Status: Completed Specimen: Blood from Peripheral, Antecubital, Left Updated: 10/03/20 0613     Blood Culture, Routine No Growth to date      No Growth to date    Blood culture [[id number]]     Order Status: Canceled Specimen: Blood     Fungus culture [[id number]]  (Abnormal) Collected: 09/18/20 1544    Order Status: Completed Specimen: Abscess from Abdomen Updated: 09/29/20 0652     Fungus (Mycology) Culture CANDIDA PARAPSILOSIS  Rare      Narrative:      1. Inter-Abdominal Abscess for cultures  2. Inter-Abdominal Abscess for cultures    Culture, VAP (BAL) [[id number]] Collected: 09/25/20 1238    Order Status: Completed Specimen: Bronchial Alveolar Lavage from BAL, LLL Updated: 09/28/20 0831     VAP BAL CULTURE Normal respiratory ted     Gram Stain (Respiratory) <10 epithelial cells per low power field.     Gram Stain (Respiratory) Rare WBC's     Gram Stain (Respiratory) No organisms seen          Significant Imaging: I have reviewed all pertinent imaging results/findings within the past 24 hours.     CT A/P 10/2/2020:  Impression:     1. Postoperative change of exploratory laparotomy for gastric perforation repair.  Percutaneous gastrostomy tube in place with diffuse gastric wall thickening/edema.  No oral contrast is within the gastric lumen limiting evaluation for residual/recurrent gastric leak.  Further evaluation as warranted clinically.  2. Persistent moderate volume of  peritoneal fluid, some of which appears loculated with peritoneal thickening and diffuse mesenteric stranding/haziness.  Findings concerning for possible component of superimposed peritonitis.  Stably positioned left-sided percutaneous drainage catheters in place.  3. Marked diffuse colonic wall thickening as well small bowel wall thickening.  Findings could be reactive in etiology or represent superimposed component of enterocolitis.  4. Marked narrowing involving the celiac axis origin, similar to prior exam.  5. Small bilateral pleural effusions with bibasilar atelectatic/consolidative change.  6. Postoperative change of orthotopic liver transplant.  7. Additional findings as above.

## 2020-10-04 LAB
ALBUMIN SERPL BCP-MCNC: 1.8 G/DL (ref 3.5–5.2)
ALP SERPL-CCNC: 605 U/L (ref 55–135)
ALT SERPL W/O P-5'-P-CCNC: 31 U/L (ref 10–44)
ANION GAP SERPL CALC-SCNC: 9 MMOL/L (ref 8–16)
AST SERPL-CCNC: 52 U/L (ref 10–40)
BASOPHILS # BLD AUTO: 0.1 K/UL (ref 0–0.2)
BASOPHILS NFR BLD: 1 % (ref 0–1.9)
BILIRUB SERPL-MCNC: 5.3 MG/DL (ref 0.1–1)
BUN SERPL-MCNC: 27 MG/DL (ref 6–20)
CALCIUM SERPL-MCNC: 8.8 MG/DL (ref 8.7–10.5)
CHLORIDE SERPL-SCNC: 111 MMOL/L (ref 95–110)
CO2 SERPL-SCNC: 25 MMOL/L (ref 23–29)
CREAT SERPL-MCNC: 0.6 MG/DL (ref 0.5–1.4)
DIFFERENTIAL METHOD: ABNORMAL
EOSINOPHIL # BLD AUTO: 0.4 K/UL (ref 0–0.5)
EOSINOPHIL NFR BLD: 4.4 % (ref 0–8)
ERYTHROCYTE [DISTWIDTH] IN BLOOD BY AUTOMATED COUNT: 16.7 % (ref 11.5–14.5)
EST. GFR  (AFRICAN AMERICAN): >60 ML/MIN/1.73 M^2
EST. GFR  (NON AFRICAN AMERICAN): >60 ML/MIN/1.73 M^2
GLUCOSE SERPL-MCNC: 104 MG/DL (ref 70–110)
HCT VFR BLD AUTO: 27 % (ref 37–48.5)
HGB BLD-MCNC: 8.1 G/DL (ref 12–16)
IMM GRANULOCYTES # BLD AUTO: 0.12 K/UL (ref 0–0.04)
IMM GRANULOCYTES NFR BLD AUTO: 1.2 % (ref 0–0.5)
LYMPHOCYTES # BLD AUTO: 1.1 K/UL (ref 1–4.8)
LYMPHOCYTES NFR BLD: 10.7 % (ref 18–48)
MAGNESIUM SERPL-MCNC: 1.6 MG/DL (ref 1.6–2.6)
MCH RBC QN AUTO: 28 PG (ref 27–31)
MCHC RBC AUTO-ENTMCNC: 30 G/DL (ref 32–36)
MCV RBC AUTO: 93 FL (ref 82–98)
MONOCYTES # BLD AUTO: 1.5 K/UL (ref 0.3–1)
MONOCYTES NFR BLD: 14.9 % (ref 4–15)
NEUTROPHILS # BLD AUTO: 6.8 K/UL (ref 1.8–7.7)
NEUTROPHILS NFR BLD: 67.8 % (ref 38–73)
NRBC BLD-RTO: 0 /100 WBC
PHOSPHATE SERPL-MCNC: 3 MG/DL (ref 2.7–4.5)
PLATELET # BLD AUTO: 185 K/UL (ref 150–350)
PMV BLD AUTO: 12.1 FL (ref 9.2–12.9)
POCT GLUCOSE: 100 MG/DL (ref 70–110)
POCT GLUCOSE: 111 MG/DL (ref 70–110)
POCT GLUCOSE: 114 MG/DL (ref 70–110)
POCT GLUCOSE: 116 MG/DL (ref 70–110)
POCT GLUCOSE: 116 MG/DL (ref 70–110)
POCT GLUCOSE: 98 MG/DL (ref 70–110)
POTASSIUM SERPL-SCNC: 3.5 MMOL/L (ref 3.5–5.1)
PROT SERPL-MCNC: 6.1 G/DL (ref 6–8.4)
RBC # BLD AUTO: 2.89 M/UL (ref 4–5.4)
SODIUM SERPL-SCNC: 145 MMOL/L (ref 136–145)
TACROLIMUS BLD-MCNC: 2.7 NG/ML (ref 5–15)
WBC # BLD AUTO: 10.03 K/UL (ref 3.9–12.7)

## 2020-10-04 PROCEDURE — 63600175 PHARM REV CODE 636 W HCPCS: Performed by: STUDENT IN AN ORGANIZED HEALTH CARE EDUCATION/TRAINING PROGRAM

## 2020-10-04 PROCEDURE — 25000003 PHARM REV CODE 250: Performed by: STUDENT IN AN ORGANIZED HEALTH CARE EDUCATION/TRAINING PROGRAM

## 2020-10-04 PROCEDURE — 99233 PR SUBSEQUENT HOSPITAL CARE,LEVL III: ICD-10-PCS | Mod: GC,,, | Performed by: INTERNAL MEDICINE

## 2020-10-04 PROCEDURE — 94640 AIRWAY INHALATION TREATMENT: CPT

## 2020-10-04 PROCEDURE — 20600001 HC STEP DOWN PRIVATE ROOM

## 2020-10-04 PROCEDURE — 25000242 PHARM REV CODE 250 ALT 637 W/ HCPCS: Performed by: STUDENT IN AN ORGANIZED HEALTH CARE EDUCATION/TRAINING PROGRAM

## 2020-10-04 PROCEDURE — 94668 MNPJ CHEST WALL SBSQ: CPT

## 2020-10-04 PROCEDURE — 80053 COMPREHEN METABOLIC PANEL: CPT

## 2020-10-04 PROCEDURE — 25000003 PHARM REV CODE 250: Performed by: SURGERY

## 2020-10-04 PROCEDURE — C9113 INJ PANTOPRAZOLE SODIUM, VIA: HCPCS | Performed by: STUDENT IN AN ORGANIZED HEALTH CARE EDUCATION/TRAINING PROGRAM

## 2020-10-04 PROCEDURE — 80197 ASSAY OF TACROLIMUS: CPT

## 2020-10-04 PROCEDURE — A4216 STERILE WATER/SALINE, 10 ML: HCPCS | Performed by: SURGERY

## 2020-10-04 PROCEDURE — 63600175 PHARM REV CODE 636 W HCPCS: Performed by: SURGERY

## 2020-10-04 PROCEDURE — B4185 PARENTERAL SOL 10 GM LIPIDS: HCPCS | Performed by: STUDENT IN AN ORGANIZED HEALTH CARE EDUCATION/TRAINING PROGRAM

## 2020-10-04 PROCEDURE — A4217 STERILE WATER/SALINE, 500 ML: HCPCS | Performed by: STUDENT IN AN ORGANIZED HEALTH CARE EDUCATION/TRAINING PROGRAM

## 2020-10-04 PROCEDURE — 27000221 HC OXYGEN, UP TO 24 HOURS

## 2020-10-04 PROCEDURE — 84100 ASSAY OF PHOSPHORUS: CPT

## 2020-10-04 PROCEDURE — 94761 N-INVAS EAR/PLS OXIMETRY MLT: CPT

## 2020-10-04 PROCEDURE — 83735 ASSAY OF MAGNESIUM: CPT

## 2020-10-04 PROCEDURE — 94664 DEMO&/EVAL PT USE INHALER: CPT

## 2020-10-04 PROCEDURE — 99233 SBSQ HOSP IP/OBS HIGH 50: CPT | Mod: GC,,, | Performed by: INTERNAL MEDICINE

## 2020-10-04 PROCEDURE — 99900035 HC TECH TIME PER 15 MIN (STAT)

## 2020-10-04 PROCEDURE — 94799 UNLISTED PULMONARY SVC/PX: CPT

## 2020-10-04 PROCEDURE — 85025 COMPLETE CBC W/AUTO DIFF WBC: CPT

## 2020-10-04 PROCEDURE — 27000646 HC AEROBIKA DEVICE

## 2020-10-04 RX ADMIN — HEPARIN SODIUM 5000 UNITS: 5000 INJECTION INTRAVENOUS; SUBCUTANEOUS at 05:10

## 2020-10-04 RX ADMIN — HYDROMORPHONE HYDROCHLORIDE 0.5 MG: 1 INJECTION, SOLUTION INTRAMUSCULAR; INTRAVENOUS; SUBCUTANEOUS at 04:10

## 2020-10-04 RX ADMIN — FLUCONAZOLE 400 MG: 2 INJECTION, SOLUTION INTRAVENOUS at 02:10

## 2020-10-04 RX ADMIN — LEVOTHYROXINE SODIUM ANHYDROUS 40 MCG: 100 INJECTION, POWDER, LYOPHILIZED, FOR SOLUTION INTRAVENOUS at 08:10

## 2020-10-04 RX ADMIN — HYDROMORPHONE HYDROCHLORIDE 0.5 MG: 1 INJECTION, SOLUTION INTRAMUSCULAR; INTRAVENOUS; SUBCUTANEOUS at 05:10

## 2020-10-04 RX ADMIN — Medication 10 ML: at 12:10

## 2020-10-04 RX ADMIN — TACROLIMUS 0.5 MG: 1 CAPSULE, GELATIN COATED ORAL at 10:10

## 2020-10-04 RX ADMIN — MEROPENEM AND SODIUM CHLORIDE 1 G: 1 INJECTION, SOLUTION INTRAVENOUS at 01:10

## 2020-10-04 RX ADMIN — HEPARIN SODIUM 5000 UNITS: 5000 INJECTION INTRAVENOUS; SUBCUTANEOUS at 10:10

## 2020-10-04 RX ADMIN — SMOFLIPID 250 ML: 6; 6; 5; 3 INJECTION, EMULSION INTRAVENOUS at 10:10

## 2020-10-04 RX ADMIN — PANTOPRAZOLE SODIUM 40 MG: 40 INJECTION, POWDER, LYOPHILIZED, FOR SOLUTION INTRAVENOUS at 08:10

## 2020-10-04 RX ADMIN — HEPARIN SODIUM 5000 UNITS: 5000 INJECTION INTRAVENOUS; SUBCUTANEOUS at 01:10

## 2020-10-04 RX ADMIN — MEROPENEM AND SODIUM CHLORIDE 1 G: 1 INJECTION, SOLUTION INTRAVENOUS at 10:10

## 2020-10-04 RX ADMIN — IPRATROPIUM BROMIDE AND ALBUTEROL SULFATE 3 ML: .5; 2.5 SOLUTION RESPIRATORY (INHALATION) at 09:10

## 2020-10-04 RX ADMIN — PROMETHAZINE HYDROCHLORIDE 6.25 MG: 25 INJECTION INTRAMUSCULAR; INTRAVENOUS at 03:10

## 2020-10-04 RX ADMIN — HYDROMORPHONE HYDROCHLORIDE 0.5 MG: 1 INJECTION, SOLUTION INTRAMUSCULAR; INTRAVENOUS; SUBCUTANEOUS at 08:10

## 2020-10-04 RX ADMIN — MEROPENEM AND SODIUM CHLORIDE 1 G: 1 INJECTION, SOLUTION INTRAVENOUS at 05:10

## 2020-10-04 RX ADMIN — HYDROMORPHONE HYDROCHLORIDE 0.5 MG: 1 INJECTION, SOLUTION INTRAMUSCULAR; INTRAVENOUS; SUBCUTANEOUS at 12:10

## 2020-10-04 RX ADMIN — Medication 10 ML: at 06:10

## 2020-10-04 RX ADMIN — MAGNESIUM SULFATE HEPTAHYDRATE: 500 INJECTION, SOLUTION INTRAMUSCULAR; INTRAVENOUS at 10:10

## 2020-10-04 RX ADMIN — Medication 10 ML: at 05:10

## 2020-10-04 NOTE — SUBJECTIVE & OBJECTIVE
Interval History: HD stable overnight. Temp 100F with and tachycardic this morning, but no new complaints.     Review of Systems   Constitutional: Negative for activity change, appetite change, chills, fever and unexpected weight change.   HENT: Negative for dental problem, ear discharge, ear pain, mouth sores, sinus pain, sore throat and trouble swallowing.    Eyes: Negative for pain and discharge.   Respiratory: Negative for cough, chest tightness, shortness of breath and wheezing.    Cardiovascular: Negative for chest pain and leg swelling.   Gastrointestinal: Positive for abdominal pain. Negative for abdominal distention, constipation, diarrhea, nausea and vomiting.   Genitourinary: Negative for difficulty urinating, dysuria, flank pain, frequency, genital sores and hematuria.   Musculoskeletal: Negative for arthralgias, joint swelling, neck pain and neck stiffness.   Skin: Negative for color change, rash and wound.   Neurological: Negative for dizziness, weakness, light-headedness, numbness and headaches.   Psychiatric/Behavioral: Negative for confusion. The patient is not nervous/anxious.      Objective:     Vital Signs (Most Recent):  Temp: 100 °F (37.8 °C) (10/04/20 0718)  Pulse: (!) 113 (10/04/20 0912)  Resp: 20 (10/04/20 0912)  BP: 138/80 (10/04/20 0849)  SpO2: (!) 93 % (10/04/20 0912) Vital Signs (24h Range):  Temp:  [98.6 °F (37 °C)-100 °F (37.8 °C)] 100 °F (37.8 °C)  Pulse:  [] 113  Resp:  [19-25] 20  SpO2:  [93 %-98 %] 93 %  BP: (138-169)/(67-80) 138/80     Weight: 67.8 kg (149 lb 7.6 oz)  Body mass index is 30.19 kg/m².    Estimated Creatinine Clearance: 117.4 mL/min (based on SCr of 0.6 mg/dL).    Physical Exam  Vitals signs reviewed.   Constitutional:       General: She is not in acute distress.     Appearance: She is well-developed. She is ill-appearing. She is not diaphoretic.   HENT:      Head: Atraumatic.      Right Ear: External ear normal.      Left Ear: External ear normal.      Nose:  Nose normal. No congestion or rhinorrhea.      Mouth/Throat:      Mouth: Mucous membranes are moist.      Pharynx: Oropharynx is clear. No oropharyngeal exudate or posterior oropharyngeal erythema.   Eyes:      General: No scleral icterus.        Right eye: No discharge.         Left eye: No discharge.      Extraocular Movements: Extraocular movements intact.      Conjunctiva/sclera: Conjunctivae normal.   Neck:      Musculoskeletal: Normal range of motion and neck supple.   Cardiovascular:      Rate and Rhythm: Normal rate and regular rhythm.      Pulses: Normal pulses.      Heart sounds: Normal heart sounds. No murmur.   Pulmonary:      Effort: Pulmonary effort is normal. No respiratory distress.      Breath sounds: Normal breath sounds. No wheezing.   Abdominal:      General: Bowel sounds are normal. There is no distension.      Palpations: Abdomen is soft.      Tenderness: There is no abdominal tenderness.      Comments: Mild distention, wound vac in place to midline incision with good seal, L sided abd drains in place w/ serous output, G-tube to gravity w/ cloudy serous output. L groin collection bag at site of prior A line, serous drainage present. Minimal tenderness to palpation.    Musculoskeletal: Normal range of motion.         General: No swelling or deformity.      Right lower leg: Edema present.      Left lower leg: Edema present.      Comments: R arm PICC line   Skin:     General: Skin is warm and dry.      Findings: No erythema or rash.   Neurological:      General: No focal deficit present.      Mental Status: She is alert and oriented to person, place, and time. Mental status is at baseline.      Cranial Nerves: No cranial nerve deficit.      Coordination: Coordination normal.   Psychiatric:         Mood and Affect: Mood normal.         Behavior: Behavior normal.         Thought Content: Thought content normal.         Judgment: Judgment normal.         Significant Labs:   CBC:   Recent Labs   Lab  10/03/20  0400 10/04/20  0310   WBC 9.52 10.03   HGB 7.8* 8.1*   HCT 25.4* 27.0*    185     CMP:   Recent Labs   Lab 10/03/20  0400 10/04/20  0310    145   K 3.8 3.5    111*   CO2 23 25   GLU 99 104   BUN 28* 27*   CREATININE 0.6 0.6   CALCIUM 8.7 8.8   PROT 5.8* 6.1   ALBUMIN 1.8* 1.8*   BILITOT 5.5* 5.3*   ALKPHOS 504* 605*   AST 53* 52*   ALT 27 31   ANIONGAP 11 9   EGFRNONAA >60.0 >60.0     Microbiology Results (last 7 days)     Procedure Component Value Units Date/Time    Blood culture [[id number]] Collected: 10/02/20 0220    Order Status: Completed Specimen: Blood from Peripheral, Antecubital, Left Updated: 10/04/20 0613     Blood Culture, Routine No Growth to date      No Growth to date      No Growth to date    Blood culture [[id number]]     Order Status: Canceled Specimen: Blood     Fungus culture [[id number]]  (Abnormal) Collected: 09/18/20 1544    Order Status: Completed Specimen: Abscess from Abdomen Updated: 09/29/20 0652     Fungus (Mycology) Culture CANDIDA PARAPSILOSIS  Rare      Narrative:      1. Inter-Abdominal Abscess for cultures  2. Inter-Abdominal Abscess for cultures    Culture, VAP (BAL) [[id number]] Collected: 09/25/20 1238    Order Status: Completed Specimen: Bronchial Alveolar Lavage from BAL, LLL Updated: 09/28/20 0831     VAP BAL CULTURE Normal respiratory ted     Gram Stain (Respiratory) <10 epithelial cells per low power field.     Gram Stain (Respiratory) Rare WBC's     Gram Stain (Respiratory) No organisms seen          Significant Imaging: I have reviewed all pertinent imaging results/findings within the past 24 hours.     CT A/P 10/2/2020:  Impression:     1. Postoperative change of exploratory laparotomy for gastric perforation repair.  Percutaneous gastrostomy tube in place with diffuse gastric wall thickening/edema.  No oral contrast is within the gastric lumen limiting evaluation for residual/recurrent gastric leak.  Further evaluation as warranted  clinically.  2. Persistent moderate volume of peritoneal fluid, some of which appears loculated with peritoneal thickening and diffuse mesenteric stranding/haziness.  Findings concerning for possible component of superimposed peritonitis.  Stably positioned left-sided percutaneous drainage catheters in place.  3. Marked diffuse colonic wall thickening as well small bowel wall thickening.  Findings could be reactive in etiology or represent superimposed component of enterocolitis.  4. Marked narrowing involving the celiac axis origin, similar to prior exam.  5. Small bilateral pleural effusions with bibasilar atelectatic/consolidative change.  6. Postoperative change of orthotopic liver transplant.  7. Additional findings as above.

## 2020-10-04 NOTE — SUBJECTIVE & OBJECTIVE
Interval History: No acute events overnight. 4L NC. NPO.     Medications:  Continuous Infusions:   TPN ADULT CENTRAL LINE CUSTOM 60 mL/hr at 10/03/20 2214     Scheduled Meds:   bisacodyL  10 mg Rectal Daily    fluconazole (DIFLUCAN) IVPB  400 mg Intravenous Q24H    heparin (porcine)  5,000 Units Subcutaneous Q8H    levothyroxine  40 mcg Intravenous Daily    lipid (SMOFLIPID)  250 mL Intravenous Daily    meropenem (MERREM) IVPB  1 g Intravenous Q8H    pantoprazole  40 mg Intravenous Daily    sodium chloride 0.9%  10 mL Intravenous Q6H     PRN Meds:acetaminophen, albuterol-ipratropium, calcium gluconate IVPB, calcium gluconate IVPB, calcium gluconate IVPB, dextrose 50%, dextrose 50%, glucagon (human recombinant), HYDROmorphone, insulin aspart U-100, iohexol, labetaloL, magnesium sulfate IVPB, magnesium sulfate IVPB, ondansetron, potassium chloride in water **AND** potassium chloride in water **AND** potassium chloride in water, promethazine, sodium chloride 0.9%, Flushing PICC Protocol **AND** sodium chloride 0.9% **AND** sodium chloride 0.9%, sodium phosphate IVPB, sodium phosphate IVPB, sodium phosphate IVPB     Review of patient's allergies indicates:   Allergen Reactions    Codeine Itching     Other reaction(s): Itching    Lipitor [atorvastatin] Other (See Comments)     Other reaction(s): Muscle pain  Muscle cranmps    Morphine Itching     Other reaction(s): nausea and vomiting     Zoloft [sertraline] Other (See Comments)     Tremors/muscle spasms     Objective:     Vital Signs (Most Recent):  Temp: 100 °F (37.8 °C) (10/04/20 0718)  Pulse: 105 (10/04/20 0749)  Resp: (!) 22 (10/04/20 0718)  BP: (!) 167/68 (10/04/20 0718)  SpO2: (!) 94 % (10/04/20 0718) Vital Signs (24h Range):  Temp:  [98.6 °F (37 °C)-100 °F (37.8 °C)] 100 °F (37.8 °C)  Pulse:  [] 105  Resp:  [19-25] 22  SpO2:  [94 %-98 %] 94 %  BP: (143-169)/(67-77) 167/68     Weight: 67.8 kg (149 lb 7.6 oz)  Body mass index is 30.19  kg/m².    Intake/Output - Last 3 Shifts       10/02 0700 - 10/03 0659 10/03 0700 - 10/04 0659 10/04 0700 - 10/05 0659    P.O. 450      I.V. (mL/kg)       IV Piggyback 100 500     .7 744     Total Intake(mL/kg) 1409.7 (20.8) 1244 (18.3)     Urine (mL/kg/hr) 3680 (2.3) 2500 (1.5)     Drains 35 254     Other 50 80     Stool 0 0     Total Output 3765 2834     Net -2355.3 -1590            Stool Occurrence 0 x 0 x 0 x          Physical Exam  Vitals signs and nursing note reviewed.   Constitutional:       Appearance: She is ill-appearing.   HENT:      Head: Normocephalic and atraumatic.      Mouth/Throat:      Mouth: Mucous membranes are dry.   Eyes:      General: No scleral icterus.  Cardiovascular:      Rate and Rhythm: Normal rate and regular rhythm.      Heart sounds: Normal heart sounds.   Abdominal:      General: There is distension.      Palpations: Abdomen is soft.      Comments: Midline incision with wound vac in place; excellent seal to wound vac.   L abd drains in place with mild serous output; G tube to gravity.   Skin:     General: Skin is warm and dry.      Coloration: Skin is not jaundiced.   Neurological:      Mental Status: She is alert.      Cranial Nerves: No cranial nerve deficit.      Sensory: No sensory deficit.      Motor: No weakness.         Significant Labs:  CBC:   Recent Labs   Lab 10/04/20  0310   WBC 10.03   RBC 2.89*   HGB 8.1*   HCT 27.0*      MCV 93   MCH 28.0   MCHC 30.0*     CMP:   Recent Labs   Lab 10/04/20  0310      CALCIUM 8.8   ALBUMIN 1.8*   PROT 6.1      K 3.5   CO2 25   *   BUN 27*   CREATININE 0.6   ALKPHOS 605*   ALT 31   AST 52*   BILITOT 5.3*       Significant Diagnostics:  I have reviewed all pertinent imaging results/findings within the past 24 hours.

## 2020-10-04 NOTE — PROGRESS NOTES
[facility]  General Surgery  Progress Note    Subjective:     History of Present Illness:  Ms. Hernandez is a 51yo F w/PMH of von Gierke disease s/p liver transplant (2002, on tacro + MMF, follows Dr. Nielsen), hx chronic rejection (bx 2015 with acute and chronic rejection s/p steroids), biliary stricture and ductopenic rejection, recently with cirrhosis on fibroscan (10/2019), HTN, and depression who presents as a transfer for further evaluation of gastric outlet obstruction and esophageal stricturing.  Patient decompensated requiring multiple pressors and intubation. CT scan showed free air. Prior to intubation patient reported severe abdominal pain.   states that patient has had epigastric pain, nausea, and vomiting for several days.     Post-Op Info:  Procedure(s) (LRB):  LAPAROTOMY, EXPLORATORY, DRAINAGE OF ABSCESS, REPAIR OF GASTRIC PERFORATION, GASTROSTOMY TUBE PLACEMENT (N/A)  APPLICATION, WOUND VAC (N/A)   16 Days Post-Op     Interval History: No acute events overnight. 4L NC. NPO.     Medications:  Continuous Infusions:   TPN ADULT CENTRAL LINE CUSTOM 60 mL/hr at 10/03/20 2214     Scheduled Meds:   bisacodyL  10 mg Rectal Daily    fluconazole (DIFLUCAN) IVPB  400 mg Intravenous Q24H    heparin (porcine)  5,000 Units Subcutaneous Q8H    levothyroxine  40 mcg Intravenous Daily    lipid (SMOFLIPID)  250 mL Intravenous Daily    meropenem (MERREM) IVPB  1 g Intravenous Q8H    pantoprazole  40 mg Intravenous Daily    sodium chloride 0.9%  10 mL Intravenous Q6H     PRN Meds:acetaminophen, albuterol-ipratropium, calcium gluconate IVPB, calcium gluconate IVPB, calcium gluconate IVPB, dextrose 50%, dextrose 50%, glucagon (human recombinant), HYDROmorphone, insulin aspart U-100, iohexol, labetaloL, magnesium sulfate IVPB, magnesium sulfate IVPB, ondansetron, potassium chloride in water **AND** potassium chloride in water **AND** potassium chloride in water, promethazine, sodium  chloride 0.9%, Flushing PICC Protocol **AND** sodium chloride 0.9% **AND** sodium chloride 0.9%, sodium phosphate IVPB, sodium phosphate IVPB, sodium phosphate IVPB     Review of patient's allergies indicates:   Allergen Reactions    Codeine Itching     Other reaction(s): Itching    Lipitor [atorvastatin] Other (See Comments)     Other reaction(s): Muscle pain  Muscle cranmps    Morphine Itching     Other reaction(s): nausea and vomiting     Zoloft [sertraline] Other (See Comments)     Tremors/muscle spasms     Objective:     Vital Signs (Most Recent):  Temp: 100 °F (37.8 °C) (10/04/20 0718)  Pulse: 105 (10/04/20 0749)  Resp: (!) 22 (10/04/20 0718)  BP: (!) 167/68 (10/04/20 0718)  SpO2: (!) 94 % (10/04/20 0718) Vital Signs (24h Range):  Temp:  [98.6 °F (37 °C)-100 °F (37.8 °C)] 100 °F (37.8 °C)  Pulse:  [] 105  Resp:  [19-25] 22  SpO2:  [94 %-98 %] 94 %  BP: (143-169)/(67-77) 167/68     Weight: 67.8 kg (149 lb 7.6 oz)  Body mass index is 30.19 kg/m².    Intake/Output - Last 3 Shifts       10/02 0700 - 10/03 0659 10/03 0700 - 10/04 0659 10/04 0700 - 10/05 0659    P.O. 450      I.V. (mL/kg)       IV Piggyback 100 500     .7 744     Total Intake(mL/kg) 1409.7 (20.8) 1244 (18.3)     Urine (mL/kg/hr) 3680 (2.3) 2500 (1.5)     Drains 35 254     Other 50 80     Stool 0 0     Total Output 3765 2834     Net -2355.3 -1590            Stool Occurrence 0 x 0 x 0 x          Physical Exam  Vitals signs and nursing note reviewed.   Constitutional:       Appearance: She is ill-appearing.   HENT:      Head: Normocephalic and atraumatic.      Mouth/Throat:      Mouth: Mucous membranes are dry.   Eyes:      General: No scleral icterus.  Cardiovascular:      Rate and Rhythm: Normal rate and regular rhythm.      Heart sounds: Normal heart sounds.   Abdominal:      General: There is distension.      Palpations: Abdomen is soft.      Comments: Midline incision with wound vac in place; excellent seal to wound vac.   L abd  drains in place with mild serous output; G tube to gravity.   Skin:     General: Skin is warm and dry.      Coloration: Skin is not jaundiced.   Neurological:      Mental Status: She is alert.      Cranial Nerves: No cranial nerve deficit.      Sensory: No sensory deficit.      Motor: No weakness.         Significant Labs:  CBC:   Recent Labs   Lab 10/04/20  0310   WBC 10.03   RBC 2.89*   HGB 8.1*   HCT 27.0*      MCV 93   MCH 28.0   MCHC 30.0*     CMP:   Recent Labs   Lab 10/04/20  0310      CALCIUM 8.8   ALBUMIN 1.8*   PROT 6.1      K 3.5   CO2 25   *   BUN 27*   CREATININE 0.6   ALKPHOS 605*   ALT 31   AST 52*   BILITOT 5.3*       Significant Diagnostics:  I have reviewed all pertinent imaging results/findings within the past 24 hours.    Assessment/Plan:     * Perforated abdominal viscus  50 y.o. female w/ free air on CT scan. S/p emergent ex lap on 9/4 w/ findings of gastric perforation, primary repair. Now w/  abdominal closure on 9/6, skin stapled closed, KERRY drains removed. Class A to OR on 9/18 for free air and extrav or oral contrast seen on CT. CT on 9/25 redemonstrated contrast leaking from stomach, but well-drained with surgical drain.    NPO  No operative intervention for gastric leaking at this time. Leak controlled with elian drain. Tissue is so friable that repeat operative intervention around the G tube will not provide a long-term solution.   G tube and NG to gravity/suction  Wound vac changes w/ wound care  TPN  PT/OT  Repeat CT scan tomorrow    Dispo: Discharge planning, possibly an LTAC        Rocio Hidalgo MD  General Surgery  [facility]

## 2020-10-04 NOTE — PROGRESS NOTES
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  Abdominal cultures with Klebsiella, Bacteroides, and yeast. CT abd/pelvis 9/25/2020 with extraluminal extravasation of ingested contrast adjacent to stomach fundus, NG tube and feeding tube protruding through gastric fundal defect, diffuse thickening of stomach, small/large bowel, bilateral pulmonary consolidative opacities. Patient with acute respiratory failure 9/25/2020 requiring intubation, concern for aspiration PNA / HAP. Due to critical illness and prolonged hospitalization, she was broadened to meropenem, vanc and micafungin on 9/24. ID signed off 9/25 as goals seemed to be shifting toward a focus on comfort, however patient has since clinically improved, is now extubated. She remains DNR. ID called back for further abx recommendations.     Abdominal cx 9/18:  Klebsiella, Bacteroides, candida parapsilosis  BCX 9/4, 10/2- NGTD    Antimicrobials:  vanc 9/25- 9/28  Ceftriaxone 9/23- 9/35; Meropenem 9/25-  Micafungin 9/25- 9/30; Fluconazole 9/30-    - repeat CT 10/2 w/ moderate amount of loculated intra-abd fluid and inflammation consistent w/ peritonitis    Recommendations:   -  Continue trevor and fluconazole  - primary team planning on re-imaging again this week prior to further interventions. Will f/u.  - please send blood cx if febrile         Will follow            Anticipated Disposition: tbd    Thank you for your consult. I will follow-up with patient. Please contact us if you have any additional questions.    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  Abdominal cultures with Klebsiella, Bacteroides, and yeast. CT abd/pelvis 9/25/2020 with extraluminal extravasation of ingested contrast adjacent to stomach fundus, NG tube and feeding tube protruding through gastric fundal defect, diffuse thickening of stomach, small/large bowel, bilateral pulmonary consolidative opacities.  Yesterday, patient intubated for respiratory failure, on vent FiO2 100%, PEEP 5. No pressor requirements.    ID called back on 10/1 for re-evaluation and recommendations on abx duration. No new abd cultures available since 9/18 (c. Parapsilosis, bacteroides, kleb pneumo). Patient has multiple drains and a wound vac in place. She is extubated and denies significant abd pain (states recently given pain med), but is tired during exam. Surgical notes reviewed, noted due to friable nature of gastric tissue, perforation is currently being controlled w/ percutaneous drainage, G-tube to drainage. Patient is on  TPN. Fever of 100.7 today. WBC stable at 13. Remains on trevor, vanc and fluconazole.     Last CT 9/25:  Impression:     In this patient with recent gastric ulcer perforation status post surgical repair, there is extraluminal extravasation of ingested contrast adjacent to the stomach fundus, in a similar location of prior perforation.  Small volume adjacent free intraperitoneal air.  Nasogastric tube and feeding tube appear to protrude through a gastric fundal defect.  Percutaneous drainage catheter in the left abdomen.     Diffuse thickening of the stomach, small and large bowel, worse since prior exam.  These findings are nonspecific and could be related to diffuse edema, infection, inflammation, or ischemia.     Bilateral pulmonary consolidative opacities, worse since prior.  Improved bilateral pleural effusions.     Significant narrowing of the celiac axis origin (sagittal series 602, image 99), however patent distally. This was not seen on prior contrast study dated 09/03/2020 .         Interval History: HD stable overnight. Temp 100F with and tachycardic this morning, but no new complaints.     Review of Systems   Constitutional: Negative for activity change, appetite change, chills, fever and unexpected weight change.   HENT: Negative for dental problem, ear discharge, ear pain, mouth sores, sinus pain, sore throat and trouble swallowing.    Eyes: Negative for pain and discharge.   Respiratory: Negative for cough, chest tightness, shortness of breath and wheezing.    Cardiovascular: Negative for chest pain and leg swelling.   Gastrointestinal: Positive for abdominal pain. Negative for abdominal distention, constipation, diarrhea, nausea and vomiting.   Genitourinary: Negative for difficulty urinating, dysuria, flank pain, frequency, genital sores and hematuria.   Musculoskeletal: Negative for arthralgias, joint swelling, neck pain and neck stiffness.   Skin: Negative for color change, rash and wound.    Neurological: Negative for dizziness, weakness, light-headedness, numbness and headaches.   Psychiatric/Behavioral: Negative for confusion. The patient is not nervous/anxious.      Objective:     Vital Signs (Most Recent):  Temp: 100 °F (37.8 °C) (10/04/20 0718)  Pulse: (!) 113 (10/04/20 0912)  Resp: 20 (10/04/20 0912)  BP: 138/80 (10/04/20 0849)  SpO2: (!) 93 % (10/04/20 0912) Vital Signs (24h Range):  Temp:  [98.6 °F (37 °C)-100 °F (37.8 °C)] 100 °F (37.8 °C)  Pulse:  [] 113  Resp:  [19-25] 20  SpO2:  [93 %-98 %] 93 %  BP: (138-169)/(67-80) 138/80     Weight: 67.8 kg (149 lb 7.6 oz)  Body mass index is 30.19 kg/m².    Estimated Creatinine Clearance: 117.4 mL/min (based on SCr of 0.6 mg/dL).    Physical Exam  Vitals signs reviewed.   Constitutional:       General: She is not in acute distress.     Appearance: She is well-developed. She is ill-appearing. She is not diaphoretic.   HENT:      Head: Atraumatic.      Right Ear: External ear normal.      Left Ear: External ear normal.      Nose: Nose normal. No congestion or rhinorrhea.      Mouth/Throat:      Mouth: Mucous membranes are moist.      Pharynx: Oropharynx is clear. No oropharyngeal exudate or posterior oropharyngeal erythema.   Eyes:      General: No scleral icterus.        Right eye: No discharge.         Left eye: No discharge.      Extraocular Movements: Extraocular movements intact.      Conjunctiva/sclera: Conjunctivae normal.   Neck:      Musculoskeletal: Normal range of motion and neck supple.   Cardiovascular:      Rate and Rhythm: Normal rate and regular rhythm.      Pulses: Normal pulses.      Heart sounds: Normal heart sounds. No murmur.   Pulmonary:      Effort: Pulmonary effort is normal. No respiratory distress.      Breath sounds: Normal breath sounds. No wheezing.   Abdominal:      General: Bowel sounds are normal. There is no distension.      Palpations: Abdomen is soft.      Tenderness: There is no abdominal tenderness.       Comments: Mild distention, wound vac in place to midline incision with good seal, L sided abd drains in place w/ serous output, G-tube to gravity w/ cloudy serous output. L groin collection bag at site of prior A line, serous drainage present. Minimal tenderness to palpation.    Musculoskeletal: Normal range of motion.         General: No swelling or deformity.      Right lower leg: Edema present.      Left lower leg: Edema present.      Comments: R arm PICC line   Skin:     General: Skin is warm and dry.      Findings: No erythema or rash.   Neurological:      General: No focal deficit present.      Mental Status: She is alert and oriented to person, place, and time. Mental status is at baseline.      Cranial Nerves: No cranial nerve deficit.      Coordination: Coordination normal.   Psychiatric:         Mood and Affect: Mood normal.         Behavior: Behavior normal.         Thought Content: Thought content normal.         Judgment: Judgment normal.         Significant Labs:   CBC:   Recent Labs   Lab 10/03/20  0400 10/04/20  0310   WBC 9.52 10.03   HGB 7.8* 8.1*   HCT 25.4* 27.0*    185     CMP:   Recent Labs   Lab 10/03/20  0400 10/04/20  0310    145   K 3.8 3.5    111*   CO2 23 25   GLU 99 104   BUN 28* 27*   CREATININE 0.6 0.6   CALCIUM 8.7 8.8   PROT 5.8* 6.1   ALBUMIN 1.8* 1.8*   BILITOT 5.5* 5.3*   ALKPHOS 504* 605*   AST 53* 52*   ALT 27 31   ANIONGAP 11 9   EGFRNONAA >60.0 >60.0     Microbiology Results (last 7 days)     Procedure Component Value Units Date/Time    Blood culture [[id number]] Collected: 10/02/20 0220    Order Status: Completed Specimen: Blood from Peripheral, Antecubital, Left Updated: 10/04/20 0613     Blood Culture, Routine No Growth to date      No Growth to date      No Growth to date    Blood culture [[id number]]     Order Status: Canceled Specimen: Blood     Fungus culture [[id number]]  (Abnormal) Collected: 09/18/20 1544    Order Status: Completed Specimen: Abscess  from Abdomen Updated: 09/29/20 0652     Fungus (Mycology) Culture CANDIDA PARAPSILOSIS  Rare      Narrative:      1. Inter-Abdominal Abscess for cultures  2. Inter-Abdominal Abscess for cultures    Culture, VAP (BAL) [[id number]] Collected: 09/25/20 1238    Order Status: Completed Specimen: Bronchial Alveolar Lavage from BAL, LLL Updated: 09/28/20 0831     VAP BAL CULTURE Normal respiratory ted     Gram Stain (Respiratory) <10 epithelial cells per low power field.     Gram Stain (Respiratory) Rare WBC's     Gram Stain (Respiratory) No organisms seen          Significant Imaging: I have reviewed all pertinent imaging results/findings within the past 24 hours.     CT A/P 10/2/2020:  Impression:     1. Postoperative change of exploratory laparotomy for gastric perforation repair.  Percutaneous gastrostomy tube in place with diffuse gastric wall thickening/edema.  No oral contrast is within the gastric lumen limiting evaluation for residual/recurrent gastric leak.  Further evaluation as warranted clinically.  2. Persistent moderate volume of peritoneal fluid, some of which appears loculated with peritoneal thickening and diffuse mesenteric stranding/haziness.  Findings concerning for possible component of superimposed peritonitis.  Stably positioned left-sided percutaneous drainage catheters in place.  3. Marked diffuse colonic wall thickening as well small bowel wall thickening.  Findings could be reactive in etiology or represent superimposed component of enterocolitis.  4. Marked narrowing involving the celiac axis origin, similar to prior exam.  5. Small bilateral pleural effusions with bibasilar atelectatic/consolidative change.  6. Postoperative change of orthotopic liver transplant.  7. Additional findings as above.

## 2020-10-04 NOTE — PLAN OF CARE
Plan of care reviewed with pt, who verbalized understanding. Abd ML w/ woundvac in place, 2 left sided abd KERRY drains w/ cloudy yellow drainage. KERRY & fem line takedown sites w/ pouches in place - clear yellow drainage. G-tube to gravity w/ yellow drainage. Pt w/ moderate edema to B/L hands, lower extremities. TPN infusing per MAR. NPO. Purewick in place w/ joão urine. Call bell in reach, bed locked in lowest position. Frequent rounds made for pt safety. Pt is AAO. VSS, will continue to monitor.

## 2020-10-04 NOTE — ASSESSMENT & PLAN NOTE
50 y.o. female w/ free air on CT scan. S/p emergent ex lap on 9/4 w/ findings of gastric perforation, primary repair. Now w/  abdominal closure on 9/6, skin stapled closed, KERRY drains removed. Class A to OR on 9/18 for free air and extrav or oral contrast seen on CT. CT on 9/25 redemonstrated contrast leaking from stomach, but well-drained with surgical drain.    NPO  No operative intervention for gastric leaking at this time. Leak controlled with elian drain. Tissue is so friable that repeat operative intervention around the G tube will not provide a long-term solution.   G tube and NG to gravity/suction  Wound vac changes w/ wound care  TPN  PT/OT  Repeat CT scan tomorrow    Dispo: Discharge planning, possibly an LTAC

## 2020-10-04 NOTE — ASSESSMENT & PLAN NOTE
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 [place] 9/3/2020, s/p ex-lap with washout and repair of gastric perforation 9/4/2020, ex-lap with washout abdominal closure 9/6/2020, found to have gastric leak, s/p ex-lap, washout, repair of gastric performation 9/18/2020.  Abdominal cultures with Klebsiella, Bacteroides, and yeast. CT abd/pelvis 9/25/2020 with extraluminal extravasation of ingested contrast adjacent to stomach fundus, NG tube and feeding tube protruding through gastric fundal defect, diffuse thickening of stomach, small/large bowel, bilateral pulmonary consolidative opacities. Patient with acute respiratory failure 9/25/2020 requiring intubation, concern for aspiration PNA / HAP. Due to critical illness and prolonged hospitalization, she was broadened to meropenem, vanc and micafungin on 9/24. ID signed off 9/25 as goals seemed to be shifting toward a focus on comfort, however patient has since clinically improved, is now extubated. She remains DNR. ID called back for further abx recommendations.     Abdominal cx 9/18:  Klebsiella, Bacteroides, candida parapsilosis  BCX 9/4, 10/2- NGTD    Antimicrobials:  vanc 9/25- 9/28  Ceftriaxone 9/23- 9/35; Meropenem 9/25-  Micafungin 9/25- 9/30; Fluconazole 9/30-    - repeat CT 10/2 w/ moderate amount of loculated intra-abd fluid and inflammation consistent w/ peritonitis    Recommendations:   - Continue trevor and fluconazole  - primary team planning on re-imaging again this week prior to further interventions. Will f/u.  - please send blood cx if febrile         Will follow

## 2020-10-04 NOTE — TREATMENT PLAN
Hepatology Treatment Plan    Elda Hernandez is a 50 y.o. female admitted to hospital 9/3/2020 (Hospital Day: 32) due to Perforated abdominal viscus. Hepatology following for immunosuppression management.    Lab Results   Component Value Date    TACROLIMUS 2.7 (L) 10/04/2020    TACROLIMUS 3.9 (L) 10/03/2020    TACROLIMUS 6.3 10/02/2020       Lab Results   Component Value Date    CREATININE 0.6 10/04/2020    CREATININE 0.6 10/03/2020    CREATININE 0.6 10/02/2020       Lab Results   Component Value Date    ALT 31 10/04/2020    AST 52 (H) 10/04/2020     (H) 04/07/2018    ALKPHOS 605 (H) 10/04/2020    BILITOT 5.3 (H) 10/04/2020    WBC 10.03 10/04/2020    HGB 8.1 (L) 10/04/2020     10/04/2020       Kidney function is stable  Liver enzymes are stable    Plan  - Changes to immunosuppression regimen as below    Immunosuppression Regimen:  Tacrolimus: 0.5mg daily sublingual    Please notify hepatology on day of discharge to discuss discharge medications and follow up.     Please obtain daily CBC, BMP, LFT, INR, immunosuppressant trough level    Thank you for involving us in the care of Elda Hernandez. Please call with any additional concerns or questions.    Fatuma Mathews MD  Gastroenterology Fellow

## 2020-10-05 LAB
ALBUMIN SERPL BCP-MCNC: 1.7 G/DL (ref 3.5–5.2)
ALP SERPL-CCNC: 587 U/L (ref 55–135)
ALT SERPL W/O P-5'-P-CCNC: 37 U/L (ref 10–44)
ANION GAP SERPL CALC-SCNC: 10 MMOL/L (ref 8–16)
AST SERPL-CCNC: 55 U/L (ref 10–40)
BASOPHILS # BLD AUTO: 0.11 K/UL (ref 0–0.2)
BASOPHILS NFR BLD: 1 % (ref 0–1.9)
BILIRUB SERPL-MCNC: 4.9 MG/DL (ref 0.1–1)
BUN SERPL-MCNC: 25 MG/DL (ref 6–20)
CALCIUM SERPL-MCNC: 8.6 MG/DL (ref 8.7–10.5)
CHLORIDE SERPL-SCNC: 109 MMOL/L (ref 95–110)
CO2 SERPL-SCNC: 25 MMOL/L (ref 23–29)
CREAT SERPL-MCNC: 0.6 MG/DL (ref 0.5–1.4)
DIFFERENTIAL METHOD: ABNORMAL
EOSINOPHIL # BLD AUTO: 0.6 K/UL (ref 0–0.5)
EOSINOPHIL NFR BLD: 5.5 % (ref 0–8)
ERYTHROCYTE [DISTWIDTH] IN BLOOD BY AUTOMATED COUNT: 17 % (ref 11.5–14.5)
EST. GFR  (AFRICAN AMERICAN): >60 ML/MIN/1.73 M^2
EST. GFR  (NON AFRICAN AMERICAN): >60 ML/MIN/1.73 M^2
GLUCOSE SERPL-MCNC: 102 MG/DL (ref 70–110)
HCT VFR BLD AUTO: 26.7 % (ref 37–48.5)
HGB BLD-MCNC: 8 G/DL (ref 12–16)
IMM GRANULOCYTES # BLD AUTO: 0.11 K/UL (ref 0–0.04)
IMM GRANULOCYTES NFR BLD AUTO: 1 % (ref 0–0.5)
LYMPHOCYTES # BLD AUTO: 1.3 K/UL (ref 1–4.8)
LYMPHOCYTES NFR BLD: 11.5 % (ref 18–48)
MAGNESIUM SERPL-MCNC: 1.5 MG/DL (ref 1.6–2.6)
MCH RBC QN AUTO: 28 PG (ref 27–31)
MCHC RBC AUTO-ENTMCNC: 30 G/DL (ref 32–36)
MCV RBC AUTO: 93 FL (ref 82–98)
MONOCYTES # BLD AUTO: 1.6 K/UL (ref 0.3–1)
MONOCYTES NFR BLD: 14.2 % (ref 4–15)
NEUTROPHILS # BLD AUTO: 7.7 K/UL (ref 1.8–7.7)
NEUTROPHILS NFR BLD: 66.8 % (ref 38–73)
NRBC BLD-RTO: 0 /100 WBC
PHOSPHATE SERPL-MCNC: 2.7 MG/DL (ref 2.7–4.5)
PLATELET # BLD AUTO: 178 K/UL (ref 150–350)
PMV BLD AUTO: 12.7 FL (ref 9.2–12.9)
POCT GLUCOSE: 102 MG/DL (ref 70–110)
POCT GLUCOSE: 107 MG/DL (ref 70–110)
POCT GLUCOSE: 115 MG/DL (ref 70–110)
POCT GLUCOSE: 116 MG/DL (ref 70–110)
POCT GLUCOSE: 119 MG/DL (ref 70–110)
POCT GLUCOSE: 120 MG/DL (ref 70–110)
POTASSIUM SERPL-SCNC: 3.3 MMOL/L (ref 3.5–5.1)
PROT SERPL-MCNC: 6.3 G/DL (ref 6–8.4)
RBC # BLD AUTO: 2.86 M/UL (ref 4–5.4)
SODIUM SERPL-SCNC: 144 MMOL/L (ref 136–145)
TACROLIMUS BLD-MCNC: 2.7 NG/ML (ref 5–15)
TRIGL SERPL-MCNC: 144 MG/DL (ref 30–150)
WBC # BLD AUTO: 11.49 K/UL (ref 3.9–12.7)

## 2020-10-05 PROCEDURE — 80197 ASSAY OF TACROLIMUS: CPT

## 2020-10-05 PROCEDURE — 27000221 HC OXYGEN, UP TO 24 HOURS

## 2020-10-05 PROCEDURE — 97110 THERAPEUTIC EXERCISES: CPT

## 2020-10-05 PROCEDURE — 25000003 PHARM REV CODE 250: Performed by: STUDENT IN AN ORGANIZED HEALTH CARE EDUCATION/TRAINING PROGRAM

## 2020-10-05 PROCEDURE — 84100 ASSAY OF PHOSPHORUS: CPT

## 2020-10-05 PROCEDURE — 20600001 HC STEP DOWN PRIVATE ROOM

## 2020-10-05 PROCEDURE — A4217 STERILE WATER/SALINE, 500 ML: HCPCS | Performed by: SURGERY

## 2020-10-05 PROCEDURE — 63600175 PHARM REV CODE 636 W HCPCS: Performed by: STUDENT IN AN ORGANIZED HEALTH CARE EDUCATION/TRAINING PROGRAM

## 2020-10-05 PROCEDURE — 97530 THERAPEUTIC ACTIVITIES: CPT

## 2020-10-05 PROCEDURE — 85025 COMPLETE CBC W/AUTO DIFF WBC: CPT

## 2020-10-05 PROCEDURE — 25000242 PHARM REV CODE 250 ALT 637 W/ HCPCS: Performed by: STUDENT IN AN ORGANIZED HEALTH CARE EDUCATION/TRAINING PROGRAM

## 2020-10-05 PROCEDURE — 97535 SELF CARE MNGMENT TRAINING: CPT

## 2020-10-05 PROCEDURE — 94640 AIRWAY INHALATION TREATMENT: CPT

## 2020-10-05 PROCEDURE — 94664 DEMO&/EVAL PT USE INHALER: CPT

## 2020-10-05 PROCEDURE — 63600175 PHARM REV CODE 636 W HCPCS: Performed by: SURGERY

## 2020-10-05 PROCEDURE — C9113 INJ PANTOPRAZOLE SODIUM, VIA: HCPCS | Performed by: STUDENT IN AN ORGANIZED HEALTH CARE EDUCATION/TRAINING PROGRAM

## 2020-10-05 PROCEDURE — 97112 NEUROMUSCULAR REEDUCATION: CPT

## 2020-10-05 PROCEDURE — A4216 STERILE WATER/SALINE, 10 ML: HCPCS | Performed by: SURGERY

## 2020-10-05 PROCEDURE — 25000003 PHARM REV CODE 250: Performed by: SURGERY

## 2020-10-05 PROCEDURE — 99233 PR SUBSEQUENT HOSPITAL CARE,LEVL III: ICD-10-PCS | Mod: ,,, | Performed by: INTERNAL MEDICINE

## 2020-10-05 PROCEDURE — 84478 ASSAY OF TRIGLYCERIDES: CPT

## 2020-10-05 PROCEDURE — B4185 PARENTERAL SOL 10 GM LIPIDS: HCPCS | Performed by: SURGERY

## 2020-10-05 PROCEDURE — 27000646 HC AEROBIKA DEVICE

## 2020-10-05 PROCEDURE — 94761 N-INVAS EAR/PLS OXIMETRY MLT: CPT

## 2020-10-05 PROCEDURE — 99233 SBSQ HOSP IP/OBS HIGH 50: CPT | Mod: ,,, | Performed by: INTERNAL MEDICINE

## 2020-10-05 PROCEDURE — 94799 UNLISTED PULMONARY SVC/PX: CPT

## 2020-10-05 PROCEDURE — 80053 COMPREHEN METABOLIC PANEL: CPT

## 2020-10-05 PROCEDURE — 83735 ASSAY OF MAGNESIUM: CPT

## 2020-10-05 PROCEDURE — 99900035 HC TECH TIME PER 15 MIN (STAT)

## 2020-10-05 RX ADMIN — HEPARIN SODIUM 5000 UNITS: 5000 INJECTION INTRAVENOUS; SUBCUTANEOUS at 04:10

## 2020-10-05 RX ADMIN — MEROPENEM AND SODIUM CHLORIDE 1 G: 1 INJECTION, SOLUTION INTRAVENOUS at 05:10

## 2020-10-05 RX ADMIN — Medication 10 ML: at 12:10

## 2020-10-05 RX ADMIN — LEVOTHYROXINE SODIUM ANHYDROUS 40 MCG: 100 INJECTION, POWDER, LYOPHILIZED, FOR SOLUTION INTRAVENOUS at 10:10

## 2020-10-05 RX ADMIN — FLUCONAZOLE 400 MG: 2 INJECTION, SOLUTION INTRAVENOUS at 05:10

## 2020-10-05 RX ADMIN — POTASSIUM PHOSPHATE, MONOBASIC AND POTASSIUM PHOSPHATE, DIBASIC 15 MMOL: 224; 236 INJECTION, SOLUTION, CONCENTRATE INTRAVENOUS at 10:10

## 2020-10-05 RX ADMIN — SMOFLIPID 250 ML: 6; 6; 5; 3 INJECTION, EMULSION INTRAVENOUS at 10:10

## 2020-10-05 RX ADMIN — Medication 10 MG: at 04:10

## 2020-10-05 RX ADMIN — IPRATROPIUM BROMIDE AND ALBUTEROL SULFATE 3 ML: .5; 2.5 SOLUTION RESPIRATORY (INHALATION) at 11:10

## 2020-10-05 RX ADMIN — Medication 10 ML: at 05:10

## 2020-10-05 RX ADMIN — HEPARIN SODIUM 5000 UNITS: 5000 INJECTION INTRAVENOUS; SUBCUTANEOUS at 10:10

## 2020-10-05 RX ADMIN — PANTOPRAZOLE SODIUM 40 MG: 40 INJECTION, POWDER, LYOPHILIZED, FOR SOLUTION INTRAVENOUS at 10:10

## 2020-10-05 RX ADMIN — HYDROMORPHONE HYDROCHLORIDE 0.5 MG: 1 INJECTION, SOLUTION INTRAMUSCULAR; INTRAVENOUS; SUBCUTANEOUS at 03:10

## 2020-10-05 RX ADMIN — HEPARIN SODIUM 5000 UNITS: 5000 INJECTION INTRAVENOUS; SUBCUTANEOUS at 05:10

## 2020-10-05 RX ADMIN — TACROLIMUS 0.5 MG: 1 CAPSULE, GELATIN COATED ORAL at 10:10

## 2020-10-05 RX ADMIN — TACROLIMUS 0.3 MG: 1 CAPSULE, GELATIN COATED ORAL at 05:10

## 2020-10-05 RX ADMIN — HYDROMORPHONE HYDROCHLORIDE 0.5 MG: 1 INJECTION, SOLUTION INTRAMUSCULAR; INTRAVENOUS; SUBCUTANEOUS at 12:10

## 2020-10-05 RX ADMIN — Medication 10 MG: at 11:10

## 2020-10-05 RX ADMIN — MEROPENEM AND SODIUM CHLORIDE 1 G: 1 INJECTION, SOLUTION INTRAVENOUS at 03:10

## 2020-10-05 RX ADMIN — HYDROMORPHONE HYDROCHLORIDE 0.5 MG: 1 INJECTION, SOLUTION INTRAMUSCULAR; INTRAVENOUS; SUBCUTANEOUS at 10:10

## 2020-10-05 RX ADMIN — MAGNESIUM SULFATE HEPTAHYDRATE: 500 INJECTION, SOLUTION INTRAMUSCULAR; INTRAVENOUS at 10:10

## 2020-10-05 RX ADMIN — Medication 10 ML: at 06:10

## 2020-10-05 RX ADMIN — HYDROMORPHONE HYDROCHLORIDE 0.5 MG: 1 INJECTION, SOLUTION INTRAMUSCULAR; INTRAVENOUS; SUBCUTANEOUS at 05:10

## 2020-10-05 RX ADMIN — MEROPENEM AND SODIUM CHLORIDE 1 G: 1 INJECTION, SOLUTION INTRAVENOUS at 11:10

## 2020-10-05 NOTE — SUBJECTIVE & OBJECTIVE
Interval History: HD stable overnight. Temp 100.1F with and tachycardic this morning, but more alert and conversant this morning.    Review of Systems   Constitutional: Negative for chills, fever and unexpected weight change.   HENT: Negative for sinus pain and sore throat.    Respiratory: Negative for cough, chest tightness, shortness of breath and wheezing.    Cardiovascular: Positive for leg swelling. Negative for chest pain.   Gastrointestinal: Positive for abdominal pain. Negative for abdominal distention, diarrhea and vomiting.   Genitourinary: Negative for difficulty urinating, dysuria, flank pain and frequency.   Musculoskeletal: Negative for arthralgias and joint swelling.   Skin: Negative for wound.   Neurological: Negative for weakness, light-headedness and numbness.   Psychiatric/Behavioral: Negative for confusion. The patient is not nervous/anxious.    All other systems reviewed and are negative.    Objective:     Vital Signs (Most Recent):  Temp: 98.8 °F (37.1 °C) (10/05/20 1254)  Pulse: 104 (10/05/20 1451)  Resp: 18 (10/05/20 1254)  BP: (!) 144/69 (10/05/20 1254)  SpO2: 97 % (10/05/20 1522) Vital Signs (24h Range):  Temp:  [98.8 °F (37.1 °C)-100.1 °F (37.8 °C)] 98.8 °F (37.1 °C)  Pulse:  [] 104  Resp:  [18-25] 18  SpO2:  [94 %-100 %] 97 %  BP: (139-186)/(67-85) 144/69     Weight: 67.8 kg (149 lb 7.6 oz)  Body mass index is 30.19 kg/m².    Estimated Creatinine Clearance: 117.4 mL/min (based on SCr of 0.6 mg/dL).    Physical Exam  Vitals signs reviewed.   Constitutional:       General: She is not in acute distress.     Appearance: She is well-developed. She is ill-appearing. She is not diaphoretic.   HENT:      Head: Atraumatic.      Right Ear: External ear normal.      Left Ear: External ear normal.      Nose: Nose normal. No congestion or rhinorrhea.      Mouth/Throat:      Mouth: Mucous membranes are moist.      Pharynx: Oropharynx is clear.   Eyes:      Extraocular Movements: Extraocular  movements intact.      Conjunctiva/sclera: Conjunctivae normal.   Neck:      Musculoskeletal: Normal range of motion and neck supple.   Cardiovascular:      Rate and Rhythm: Normal rate and regular rhythm.      Pulses: Normal pulses.      Heart sounds: Normal heart sounds. No murmur.   Pulmonary:      Effort: Pulmonary effort is normal. No respiratory distress.      Breath sounds: Normal breath sounds. No wheezing.   Abdominal:      General: Bowel sounds are normal. There is no distension.      Palpations: Abdomen is soft.      Tenderness: There is no abdominal tenderness.      Comments: Mild distention, wound vac in place to midline incision with good seal, L sided abd drains in place w/ serous output, G-tube to gravity w/ cloudy serous output. L groin collection bag at site of prior A line, serous drainage present. Minimal tenderness to palpation.    Musculoskeletal: Normal range of motion.         General: No swelling or deformity.      Right lower leg: Edema present.      Left lower leg: Edema present.      Comments: R arm PICC line   Skin:     General: Skin is warm and dry.      Findings: No erythema or rash.   Neurological:      General: No focal deficit present.      Mental Status: She is alert and oriented to person, place, and time. Mental status is at baseline.      Cranial Nerves: No cranial nerve deficit.      Coordination: Coordination normal.   Psychiatric:         Mood and Affect: Mood normal.         Behavior: Behavior normal.         Thought Content: Thought content normal.         Judgment: Judgment normal.         Significant Labs:   CBC:   Recent Labs   Lab 10/04/20  0310 10/05/20  0415   WBC 10.03 11.49   HGB 8.1* 8.0*   HCT 27.0* 26.7*    178     CMP:   Recent Labs   Lab 10/04/20  0310 10/05/20  0415    144   K 3.5 3.3*   * 109   CO2 25 25    102   BUN 27* 25*   CREATININE 0.6 0.6   CALCIUM 8.8 8.6*   PROT 6.1 6.3   ALBUMIN 1.8* 1.7*   BILITOT 5.3* 4.9*   ALKPHOS 605*  587*   AST 52* 55*   ALT 31 37   ANIONGAP 9 10   EGFRNONAA >60.0 >60.0     Microbiology Results (last 7 days)     Procedure Component Value Units Date/Time    Blood culture [[id number]] Collected: 10/02/20 0220    Order Status: Completed Specimen: Blood from Peripheral, Antecubital, Left Updated: 10/05/20 0613     Blood Culture, Routine No Growth to date      No Growth to date      No Growth to date      No Growth to date    Blood culture [[id number]]     Order Status: Canceled Specimen: Blood     Fungus culture [[id number]]  (Abnormal) Collected: 09/18/20 1544    Order Status: Completed Specimen: Abscess from Abdomen Updated: 09/29/20 0652     Fungus (Mycology) Culture CANDIDA PARAPSILOSIS  Rare      Narrative:      1. Inter-Abdominal Abscess for cultures  2. Inter-Abdominal Abscess for cultures          Significant Imaging: I have reviewed all pertinent imaging results/findings within the past 24 hours.     CT A/P 10/2/2020:  Impression:     1. Postoperative change of exploratory laparotomy for gastric perforation repair.  Percutaneous gastrostomy tube in place with diffuse gastric wall thickening/edema.  No oral contrast is within the gastric lumen limiting evaluation for residual/recurrent gastric leak.  Further evaluation as warranted clinically.  2. Persistent moderate volume of peritoneal fluid, some of which appears loculated with peritoneal thickening and diffuse mesenteric stranding/haziness.  Findings concerning for possible component of superimposed peritonitis.  Stably positioned left-sided percutaneous drainage catheters in place.  3. Marked diffuse colonic wall thickening as well small bowel wall thickening.  Findings could be reactive in etiology or represent superimposed component of enterocolitis.  4. Marked narrowing involving the celiac axis origin, similar to prior exam.  5. Small bilateral pleural effusions with bibasilar atelectatic/consolidative change.  6. Postoperative change of orthotopic  liver transplant.  7. Additional findings as above.

## 2020-10-05 NOTE — PROGRESS NOTES
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  Abdominal cultures with Klebsiella, Bacteroides, and yeast. CT abd/pelvis 9/25/2020 with extraluminal extravasation of ingested contrast adjacent to stomach fundus, NG tube and feeding tube protruding through gastric fundal defect, diffuse thickening of stomach, small/large bowel, bilateral pulmonary consolidative opacities. Patient with acute respiratory failure 9/25/2020 requiring intubation, concern for aspiration PNA / HAP. Due to critical illness and prolonged hospitalization, she was broadened to meropenem, vanc and micafungin on 9/24. ID signed off 9/25 as goals seemed to be shifting toward a focus on comfort, however patient has since clinically improved, is now extubated. She remains DNR. ID called back for further abx recommendations.     Abdominal cx 9/18:  Klebsiella, Bacteroides, candida parapsilosis  BCX 9/4, 10/2- NGTD    Antimicrobials:  vanc 9/25- 9/28  Ceftriaxone 9/23- 9/25; Meropenem 9/25-  Micafungin 9/25- 9/30; Fluconazole 9/30-    - repeat CT 10/2 w/ moderate amount of loculated intra-abd fluid and inflammation consistent w/ peritonitis    Recommendations:   -  Continue trevor and fluconazole   - Consider de-escalating trevor to ceftriaxone based on prior culture susceptibilities vs waiting on new culture data.  - primary team planning on re-imaging again this week prior to further interventions. Will f/u.  - please send blood cx if febrile  - Lt LE more edematous than Rt; consider US LE to r/o DVT as could be contributing to low grade fevers.         Will follow            Anticipated Disposition: tbd    Thank you for your consult. I will follow-up with patient. Please contact us if you have any additional questions.    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  Abdominal cultures with Klebsiella, Bacteroides, and yeast. CT abd/pelvis 9/25/2020 with extraluminal extravasation of ingested contrast adjacent to stomach fundus, NG tube and feeding tube protruding through gastric fundal defect, diffuse thickening of stomach, small/large bowel, bilateral pulmonary consolidative opacities.  Yesterday, patient intubated for respiratory failure, on vent FiO2 100%, PEEP 5. No pressor requirements.    ID called back on 10/1 for re-evaluation and recommendations on abx duration. No new abd cultures available since 9/18 (c. Parapsilosis, bacteroides, kleb pneumo). Patient has multiple drains and a wound vac in place. She is extubated and denies significant abd pain (states  recently given pain med), but is tired during exam. Surgical notes reviewed, noted due to friable nature of gastric tissue, perforation is currently being controlled w/ percutaneous drainage, G-tube to drainage. Patient is on TPN. Fever of 100.7 today. WBC stable at 13. Remains on trevor, vanc and fluconazole.     Last CT 9/25:  Impression:     In this patient with recent gastric ulcer perforation status post surgical repair, there is extraluminal extravasation of ingested contrast adjacent to the stomach fundus, in a similar location of prior perforation.  Small volume adjacent free intraperitoneal air.  Nasogastric tube and feeding tube appear to protrude through a gastric fundal defect.  Percutaneous drainage catheter in the left abdomen.     Diffuse thickening of the stomach, small and large bowel, worse since prior exam.  These findings are nonspecific and could be related to diffuse edema, infection, inflammation, or ischemia.     Bilateral pulmonary consolidative opacities, worse since prior.  Improved bilateral pleural effusions.     Significant narrowing of the celiac axis origin (sagittal series 602, image 99), however patent distally. This was not seen on prior contrast study dated 09/03/2020 .         Interval History: HD stable overnight. Temp 100.1F with and tachycardic this morning, but more alert and conversant this morning.    Review of Systems   Constitutional: Negative for chills, fever and unexpected weight change.   HENT: Negative for sinus pain and sore throat.    Respiratory: Negative for cough, chest tightness, shortness of breath and wheezing.    Cardiovascular: Positive for leg swelling. Negative for chest pain.   Gastrointestinal: Positive for abdominal pain. Negative for abdominal distention, diarrhea and vomiting.   Genitourinary: Negative for difficulty urinating, dysuria, flank pain and frequency.   Musculoskeletal: Negative for arthralgias and joint swelling.   Skin: Negative for wound.    Neurological: Negative for weakness, light-headedness and numbness.   Psychiatric/Behavioral: Negative for confusion. The patient is not nervous/anxious.    All other systems reviewed and are negative.    Objective:     Vital Signs (Most Recent):  Temp: 98.8 °F (37.1 °C) (10/05/20 1254)  Pulse: 104 (10/05/20 1451)  Resp: 18 (10/05/20 1254)  BP: (!) 144/69 (10/05/20 1254)  SpO2: 97 % (10/05/20 1522) Vital Signs (24h Range):  Temp:  [98.8 °F (37.1 °C)-100.1 °F (37.8 °C)] 98.8 °F (37.1 °C)  Pulse:  [] 104  Resp:  [18-25] 18  SpO2:  [94 %-100 %] 97 %  BP: (139-186)/(67-85) 144/69     Weight: 67.8 kg (149 lb 7.6 oz)  Body mass index is 30.19 kg/m².    Estimated Creatinine Clearance: 117.4 mL/min (based on SCr of 0.6 mg/dL).    Physical Exam  Vitals signs reviewed.   Constitutional:       General: She is not in acute distress.     Appearance: She is well-developed. She is ill-appearing. She is not diaphoretic.   HENT:      Head: Atraumatic.      Right Ear: External ear normal.      Left Ear: External ear normal.      Nose: Nose normal. No congestion or rhinorrhea.      Mouth/Throat:      Mouth: Mucous membranes are moist.      Pharynx: Oropharynx is clear.   Eyes:      Extraocular Movements: Extraocular movements intact.      Conjunctiva/sclera: Conjunctivae normal.   Neck:      Musculoskeletal: Normal range of motion and neck supple.   Cardiovascular:      Rate and Rhythm: Normal rate and regular rhythm.      Pulses: Normal pulses.      Heart sounds: Normal heart sounds. No murmur.   Pulmonary:      Effort: Pulmonary effort is normal. No respiratory distress.      Breath sounds: Normal breath sounds. No wheezing.   Abdominal:      General: Bowel sounds are normal. There is no distension.      Palpations: Abdomen is soft.      Tenderness: There is no abdominal tenderness.      Comments: Mild distention, wound vac in place to midline incision with good seal, L sided abd drains in place w/ serous output, G-tube to  gravity w/ cloudy serous output. L groin collection bag at site of prior A line, serous drainage present. Minimal tenderness to palpation.    Musculoskeletal: Normal range of motion.         General: No swelling or deformity.      Right lower leg: Edema present.      Left lower leg: Edema present.      Comments: R arm PICC line   Skin:     General: Skin is warm and dry.      Findings: No erythema or rash.   Neurological:      General: No focal deficit present.      Mental Status: She is alert and oriented to person, place, and time. Mental status is at baseline.      Cranial Nerves: No cranial nerve deficit.      Coordination: Coordination normal.   Psychiatric:         Mood and Affect: Mood normal.         Behavior: Behavior normal.         Thought Content: Thought content normal.         Judgment: Judgment normal.         Significant Labs:   CBC:   Recent Labs   Lab 10/04/20  0310 10/05/20  0415   WBC 10.03 11.49   HGB 8.1* 8.0*   HCT 27.0* 26.7*    178     CMP:   Recent Labs   Lab 10/04/20  0310 10/05/20  0415    144   K 3.5 3.3*   * 109   CO2 25 25    102   BUN 27* 25*   CREATININE 0.6 0.6   CALCIUM 8.8 8.6*   PROT 6.1 6.3   ALBUMIN 1.8* 1.7*   BILITOT 5.3* 4.9*   ALKPHOS 605* 587*   AST 52* 55*   ALT 31 37   ANIONGAP 9 10   EGFRNONAA >60.0 >60.0     Microbiology Results (last 7 days)     Procedure Component Value Units Date/Time    Blood culture [[id number]] Collected: 10/02/20 0220    Order Status: Completed Specimen: Blood from Peripheral, Antecubital, Left Updated: 10/05/20 0613     Blood Culture, Routine No Growth to date      No Growth to date      No Growth to date      No Growth to date    Blood culture [[id number]]     Order Status: Canceled Specimen: Blood     Fungus culture [[id number]]  (Abnormal) Collected: 09/18/20 1544    Order Status: Completed Specimen: Abscess from Abdomen Updated: 09/29/20 0652     Fungus (Mycology) Culture CANDIDA PARAPSILOSIS  Rare      Narrative:       1. Inter-Abdominal Abscess for cultures  2. Inter-Abdominal Abscess for cultures          Significant Imaging: I have reviewed all pertinent imaging results/findings within the past 24 hours.     CT A/P 10/2/2020:  Impression:     1. Postoperative change of exploratory laparotomy for gastric perforation repair.  Percutaneous gastrostomy tube in place with diffuse gastric wall thickening/edema.  No oral contrast is within the gastric lumen limiting evaluation for residual/recurrent gastric leak.  Further evaluation as warranted clinically.  2. Persistent moderate volume of peritoneal fluid, some of which appears loculated with peritoneal thickening and diffuse mesenteric stranding/haziness.  Findings concerning for possible component of superimposed peritonitis.  Stably positioned left-sided percutaneous drainage catheters in place.  3. Marked diffuse colonic wall thickening as well small bowel wall thickening.  Findings could be reactive in etiology or represent superimposed component of enterocolitis.  4. Marked narrowing involving the celiac axis origin, similar to prior exam.  5. Small bilateral pleural effusions with bibasilar atelectatic/consolidative change.  6. Postoperative change of orthotopic liver transplant.  7. Additional findings as above.

## 2020-10-05 NOTE — PROGRESS NOTES
Pt called nurse in with complaints of SOB. Pt on 4 L NC, RR range from 18-24, irregular breathing pattern. Charge nurse aware. Attempted to page MD on call. Rapid team called to assess. Called RT for PRN resp tx.    wctm

## 2020-10-05 NOTE — ASSESSMENT & PLAN NOTE
50 y.o. female w/ free air on CT scan. S/p emergent ex lap on 9/4 w/ findings of gastric perforation, primary repair. Now w/  abdominal closure on 9/6, skin stapled closed, KERRY drains removed. Class A to OR on 9/18 for free air and extrav or oral contrast seen on CT. CT on 9/25 redemonstrated contrast leaking from stomach, but well-drained with surgical drain.    NPO  G tube study today and if normal will advance diet to sips of clears  G tube and NG to gravity/suction  Wound vac changes w/ wound care  TPN  PT/OT    Dispo: Discharge planning, possibly an LTAC

## 2020-10-05 NOTE — PROGRESS NOTES
Spoke with Dr. Martin again about uneqal pupils. MD aware and ok with it, Md stated neurological status WNL and not worried about it due to hx of it in the ICU. MD gave ok to give home eye drops. Rapid team and charge nurse aware as well. Pt received Prn albuterol tx earlier this AM, pupils WNL beforehand.    [email or web address]

## 2020-10-05 NOTE — PROGRESS NOTES
Patient seen for wound care follow up for wound vac dressing change to midline abdomen. Wound vac dressing changed. Cleansed with sterile normal saline, cavilon skin barrier to periwound, packed with one black sponge, seal obtained at 125mmHg. Patient tolerated care well. Wound care to change wound vac twice a week. Wound care to follow prn.    Upper midline abdomen: Full thickness skin loss, moist red/pink granular tissue, no drainage or odor. Staples noted to lower midline abdominal incision.

## 2020-10-05 NOTE — TREATMENT PLAN
Hepatology Treatment Plan    Elda Hernandez is a 50 y.o. female admitted to hospital 9/3/2020 (Hospital Day: 33) due to Perforated abdominal viscus. Hepatology following for immunosuppression management.    Lab Results   Component Value Date    TACROLIMUS 2.7 (L) 10/05/2020    TACROLIMUS 2.7 (L) 10/04/2020    TACROLIMUS 3.9 (L) 10/03/2020       Lab Results   Component Value Date    CREATININE 0.6 10/05/2020    CREATININE 0.6 10/04/2020    CREATININE 0.6 10/03/2020       Lab Results   Component Value Date    ALT 37 10/05/2020    AST 55 (H) 10/05/2020     (H) 04/07/2018    ALKPHOS 587 (H) 10/05/2020    BILITOT 4.9 (H) 10/05/2020    WBC 11.49 10/05/2020    HGB 8.0 (L) 10/05/2020     10/05/2020       Kidney function is stable  Liver enzymes are stable    Plan  - Changes to immunosuppression regimen as below    Immunosuppression Regimen:  Tacrolimus: 0.5mg qAM, 0.3mg qPM sublingual    Please notify hepatology on day of discharge to discuss discharge medications and follow up.     Please obtain daily CBC, BMP, LFT, INR, immunosuppressant trough level    Thank you for involving us in the care of Elda Hernandez. Please call with any additional concerns or questions.    Fatuma Mathews MD  Gastroenterology Fellow

## 2020-10-05 NOTE — PT/OT/SLP PROGRESS
Occupational Therapy   Treatment    Name: Elda Hernandez  MRN: [id number]  Admitting Diagnosis:  Perforated abdominal viscus  17 Days Post-Op  *co-treatment with PT   Recommendations:     Discharge Recommendations: nursing facility, skilled  Discharge Equipment Recommendations:  bedside commode, walker, rolling, bath bench  Barriers to discharge:  None    Assessment:     Elda Hernandez is a 50 y.o. female with a medical diagnosis of Perforated abdominal viscus.  She presents with HOB elevated, anxious, but willing to participate in OT/PT session. Pt with c/o of SOB, however SpO2 remained above 90% throughout session. Pt progressing towards goals, completing ADLs EOB and 2 sit<>stand transfers.  Performance deficits affecting function are weakness, impaired endurance, impaired self care skills, impaired functional mobilty, gait instability, impaired balance, impaired cardiopulmonary response to activity. Pt would benefit from skilled OT services in order to maximize independence with ADLs and facilitate safe discharge. Pt would benefit from SNF upon discharge to return to [facility] and decrease burden of care.     Rehab Prognosis:  Good; patient would benefit from acute skilled OT services to address these deficits and reach maximum level of function.       Plan:     Patient to be seen 4 x/week to address the above listed problems via self-care/home management, therapeutic activities, therapeutic exercises  · Plan of Care Expires: 10/15/20  · Plan of Care Reviewed with: patient, spouse    Subjective     Pain/Comfort:  · Pain Rating 1: 0/10    Objective:     Communicated with: RN and PT prior to session.  Patient found HOB elevated with wound vac, telemetry, PICC line, PureWick, KERRY drain, pulse ox (continuous), oxygen upon OT entry to room.    General Precautions: Standard, fall   Orthopedic Precautions:N/A   Braces: N/A     Occupational Performance:     Bed Mobility:    · Patient completed Rolling/Turning to Right with  moderate assistance  · Patient completed Scooting/Bridging with moderate assistance  · Patient completed Supine to Sit with moderate assistance  · Patient completed Sit to Supine with moderate assistance     Functional Mobility/Transfers:  · Patient completed Sit <> Stand Transfer with contact guard assistance  with  hand-held assist x2 trials EOB  · Functional Mobility: unable to maintain standing    Activities of Daily Living:  · Grooming: set-up A to complete facial hygiene seated EOB with supervision for sitting balance  · Lower Body Dressing: total assistance to don B socks and PRAFO boot  · Toileting: dependence void via purewick      AMPAC 6 Click ADL: 11    Treatment & Education:  -Therapist provided facilitation and instruction of proper body mechanics, energy conservation, and fall prevention strategies during tasks listed above.  -Pt educated on role of OT, POC and goals for therapy  -Pt sat EOB x15 minutes with supervision, pt emotional and anxious. Time provided for encouragement and therapeutic listening  -Pt verbalized understanding. Pt expressed no further concerns/questions  -Whiteboard updated    Patient left HOB elevated with all lines intact, call button in reach and spouse presentEducation:      GOALS:   Multidisciplinary Problems     Occupational Therapy Goals        Problem: Occupational Therapy Goal    Goal Priority Disciplines Outcome Interventions   Occupational Therapy Goal     OT, PT/OT Ongoing, Progressing    Description: Goals to be met by: 10/19/20     Patient will increase functional independence with ADLs by performing:    UE Dressing with min A  LE Dressing with min A  Grooming while sitting EOB with min A-MET 10/5  [place] [facility]  [place] min A for hygiene and clothing management.   Toilet [place] to [facility] [place] min A  Pt will tolerate sitting EOB with VSS with CGA while engaging in functional reaching tasks                      Time Tracking:     OT Date of Treatment: 10/05/20  OT  Start Time: 1130  OT Stop Time: 1208  OT Total Time (min): 38 min    Billable Minutes:Self Care/Home Management 30  Therapeutic Activity 8    Stephanie Bautista OT  10/5/2020

## 2020-10-05 NOTE — PROGRESS NOTES
[facility]  General Surgery  Progress Note    Subjective:     History of Present Illness:  Ms. Hernandez is a 49yo F w/PMH of von Gierke disease s/p liver transplant (2002, on tacro + MMF, follows Dr. Nielsen), hx chronic rejection (bx 2015 with acute and chronic rejection s/p steroids), biliary stricture and ductopenic rejection, recently with cirrhosis on fibroscan (10/2019), HTN, and depression who presents as a transfer for further evaluation of gastric outlet obstruction and esophageal stricturing.  Patient decompensated requiring multiple pressors and intubation. CT scan showed free air. Prior to intubation patient reported severe abdominal pain.   states that patient has had epigastric pain, nausea, and vomiting for several days.     Post-Op Info:  Procedure(s) (LRB):  LAPAROTOMY, EXPLORATORY, DRAINAGE OF ABSCESS, REPAIR OF GASTRIC PERFORATION, GASTROSTOMY TUBE PLACEMENT (N/A)  APPLICATION, WOUND VAC (N/A)   17 Days Post-Op     Interval History: No acute events overnight. 4L NC. NPO.    Medications:  Continuous Infusions:   TPN ADULT CENTRAL LINE CUSTOM 60 mL/hr at 10/04/20 2229    TPN ADULT CENTRAL LINE CUSTOM       Scheduled Meds:   bisacodyL  10 mg Rectal Daily    fat emulsion 20%  250 mL Intravenous Daily    fluconazole (DIFLUCAN) IVPB  400 mg Intravenous Q24H    heparin (porcine)  5,000 Units Subcutaneous Q8H    levothyroxine  40 mcg Intravenous Daily    lipid (SMOFLIPID)  250 mL Intravenous Daily    meropenem (MERREM) IVPB  1 g Intravenous Q8H    pantoprazole  40 mg Intravenous Daily    sodium chloride 0.9%  10 mL Intravenous Q6H    tacrolimus  0.5 mg Oral Daily AM     PRN Meds:acetaminophen, albuterol-ipratropium, calcium gluconate IVPB, calcium gluconate IVPB, calcium gluconate IVPB, dextrose 50%, dextrose 50%, glucagon (human recombinant), HYDROmorphone, insulin aspart U-100, iohexol, labetaloL, magnesium sulfate IVPB, magnesium sulfate IVPB, ondansetron,  potassium chloride in water **AND** potassium chloride in water **AND** potassium chloride in water, promethazine, sodium chloride 0.9%, Flushing PICC Protocol **AND** sodium chloride 0.9% **AND** sodium chloride 0.9%, sodium phosphate IVPB, sodium phosphate IVPB, sodium phosphate IVPB     Review of patient's allergies indicates:   Allergen Reactions    Codeine Itching     Other reaction(s): Itching    Lipitor [atorvastatin] Other (See Comments)     Other reaction(s): Muscle pain  Muscle cranmps    Morphine Itching     Other reaction(s): nausea and vomiting     Zoloft [sertraline] Other (See Comments)     Tremors/muscle spasms     Objective:     Vital Signs (Most Recent):  Temp: 98.9 °F (37.2 °C) (10/05/20 0407)  Pulse: 97 (10/05/20 0700)  Resp: 18 (10/05/20 0539)  BP: (!) 143/67 (10/05/20 0411)  SpO2: 95 % (10/05/20 0407) Vital Signs (24h Range):  Temp:  [98.9 °F (37.2 °C)-99.4 °F (37.4 °C)] 98.9 °F (37.2 °C)  Pulse:  [] 97  Resp:  [18-25] 18  SpO2:  [93 %-96 %] 95 %  BP: (138-169)/(67-85) 143/67     Weight: 67.8 kg (149 lb 7.6 oz)  Body mass index is 30.19 kg/m².    Intake/Output - Last 3 Shifts       10/03 0700 - 10/04 0659 10/04 0700 - 10/05 0659 10/05 0700 - 10/06 0659    P.O.       Blood  0     NG/GT  0     IV Piggyback 500 400      2048.6     Total Intake(mL/kg) 1244 (18.3) 2448.6 (36.1)     Urine (mL/kg/hr) 2500 (1.5) 2251 (1.4)     Emesis/NG output  0     Drains 254 52.5     Other 80 0     Stool 0 0     Blood  0     Total Output 2834 2303.5     Net -1590 +145.1            Urine Occurrence  0 x     Stool Occurrence 0 x 0 x     Emesis Occurrence  0 x           Physical Exam  Vitals signs and nursing note reviewed.   Constitutional:       Appearance: She is ill-appearing.   HENT:      Head: Normocephalic and atraumatic.      Mouth/Throat:      Mouth: Mucous membranes are dry.   Eyes:      General: No scleral icterus.  Cardiovascular:      Rate and Rhythm: Normal rate and regular rhythm.       Heart sounds: Normal heart sounds.   Abdominal:      General: There is distension.      Palpations: Abdomen is soft.      Comments: Midline incision with wound vac in place; excellent seal to wound vac.   L abd drains in place with mild serous output; G tube to gravity.   Skin:     General: Skin is warm and dry.      Coloration: Skin is not jaundiced.   Neurological:      Mental Status: She is alert.      Cranial Nerves: No cranial nerve deficit.      Sensory: No sensory deficit.      Motor: No weakness.         Significant Labs:  CBC:   Recent Labs   Lab 10/05/20  0415   WBC 11.49   RBC 2.86*   HGB 8.0*   HCT 26.7*      MCV 93   MCH 28.0   MCHC 30.0*     CMP:   Recent Labs   Lab 10/05/20  0415      CALCIUM 8.6*   ALBUMIN 1.7*   PROT 6.3      K 3.3*   CO2 25      BUN 25*   CREATININE 0.6   ALKPHOS 587*   ALT 37   AST 55*   BILITOT 4.9*       Significant Diagnostics:  I have reviewed all pertinent imaging results/findings within the past 24 hours.    Assessment/Plan:     * Perforated abdominal viscus  50 y.o. female w/ free air on CT scan. S/p emergent ex lap on 9/4 w/ findings of gastric perforation, primary repair. Now w/  abdominal closure on 9/6, skin stapled closed, KERRY drains removed. Class A to OR on 9/18 for free air and extrav or oral contrast seen on CT. CT on 9/25 redemonstrated contrast leaking from stomach, but well-drained with surgical drain.    NPO  G tube study today and if normal will advance diet to sips of clears  G tube and NG to gravity/suction  Wound vac changes w/ wound care  TPN  PT/OT    Dispo: Discharge planning, possibly an LTAC        Rocio Hidalgo MD  General Surgery  [facility]

## 2020-10-05 NOTE — PLAN OF CARE
Problem: Physical Therapy Goal  Goal: Physical Therapy Goal  Description: Goals to be met by: 10/15/2020     Patient will increase functional independence with mobility by performin. Supine to sit with Moderate Assistance - MET 10/5/2020  2. Rolling to Left and Right with Moderate Assistance. - MET 10/5/2020  3. Sit to stand transfer with Moderate Assistance - MET 10/5/2020  4. Sitting at edge of bed x10 minutes with Stand-by Assistance - MET 10/5/2020    REVISED:    1. Supine to sit with Set-up [place]  2. Sit to supine with Set-up [place]  3. Sit to stand transfer with Supervision  4. Bed to chair transfer with Minimal Assistance using Rolling Walker  5. Gait  x 50 feet with Minimal Assistance using Rolling Walker.     Outcome: Ongoing, Progressing   Pt met goals outlined in POC.  Goals updated.  Progressing well.    John Cruz, PT,DPT  10/5/2020

## 2020-10-05 NOTE — ASSESSMENT & PLAN NOTE
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  Abdominal cultures with Klebsiella, Bacteroides, and yeast. CT abd/pelvis 9/25/2020 with extraluminal extravasation of ingested contrast adjacent to stomach fundus, NG tube and feeding tube protruding through gastric fundal defect, diffuse thickening of stomach, small/large bowel, bilateral pulmonary consolidative opacities. Patient with acute respiratory failure 9/25/2020 requiring intubation, concern for aspiration PNA / HAP. Due to critical illness and prolonged hospitalization, she was broadened to meropenem, vanc and micafungin on 9/24. ID signed off 9/25 as goals seemed to be shifting toward a focus on comfort, however patient has since clinically improved, is now extubated. She remains DNR. ID called back for further abx recommendations.     Abdominal cx 9/18:  Klebsiella, Bacteroides, candida parapsilosis  BCX 9/4, 10/2- NGTD    Antimicrobials:  vanc 9/25- 9/28  Ceftriaxone 9/23- 9/25; Meropenem 9/25-  Micafungin 9/25- 9/30; Fluconazole 9/30-    - repeat CT 10/2 w/ moderate amount of loculated intra-abd fluid and inflammation consistent w/ peritonitis    Recommendations:   - Continue trevor and fluconazole   - Consider de-escalating trevor to ceftriaxone based on prior culture susceptibilities vs waiting on new culture data.  - primary team planning on re-imaging again this week prior to further interventions. Will f/u.  - please send blood cx if febrile         Will follow

## 2020-10-05 NOTE — CARE UPDATE
"RAPID RESPONSE NURSE PROACTIVE ROUNDING NOTE     Time of Visit: 1125    Admit Date: 9/3/2020  LOS: 32  Code Status: Full Code   Date of Visit: 10/05/2020  : 1970  Age: 50 y.o.  Sex: female  Race: White  [facility]: [facility] A:   MRN: [id number]  Was the patient discharged from an ICU this admission? yes   Was the patient discharged from a PACU within last 24 hours?  no  Did the patient receive conscious sedation/general anesthesia in last 24 hours?  no  Was the patient in the ED within the past 24 hours?  no  Was the patient started on NIPPV within the past 24 hours?  no  Attending Physician: Clark Rodriguez MD  Primary Service: Networked reference to record PCT     ASSESSMENT     Notified by bedside RN via phone call.  Reason for alert: WOB    Diagnosis: Perforated abdominal viscus    Abnormal Vital Signs: BP (!) 144/69 (BP Location: Left arm, Patient Position: Lying)   Pulse 107   Temp 98.8 °F (37.1 °C) (Oral)   Resp 18   Ht 4' 11" (1.499 m)   Wt 67.8 kg (149 lb 7.6 oz)   SpO2 100%   BMI 30.19 kg/m²      Clinical Issues: Respiratory    Patient  has a past medical history of Angiolipoma of kidney, Arnold-Chiari malformation, Depression, Esophageal stricture, Essential tremor, Hypertension, Left bundle branch block, Liver fibrosis, transplanted liver, Migraine without aura, MVP (mitral valve prolapse), Non-rheumatic mitral regurgitation, Non-rheumatic tricuspid valve insufficiency, Osteoporosis, Recurrent urinary tract infection, Seizures, Shingles, SIADH (syndrome of inappropriate ADH production), Squamous cell carcinoma, Tricuspid valve prolapse, Urolithiasis, and Von Gierke disease.      RRT RN called to bedside to assess pt R/T WOB.   Upon arrival to room, pt resting in bed.   HOB ~45 degrees.   Pt AAOx4.   Opens eyes spontaneously.   Moving all extremities freely/equally.  Following all commands.   Nods/gestures appropriately.   Denies pain.   Pt tachypneic.   ~20- 25 BPM noted.   Breath sounds coarse " upon auscultation.   Irregular breathing pattern noted.   Pt on 4L NC.   SpO2 ~96%, sustaining.   RT called to administer PRN breathing treatment.   JAS Hidalgo MD paged for update in pts status.   CXR to be completed per MD order.   Plan of care reviewed with pt.      INTERVENTIONS/ RECOMMENDATIONS     Monitor respiratory status, O2 requirements, CXR results.     Discussed plan of care with RNAllison.    PHYSICIAN ESCALATION     Yes/No  yes    Orders received and case discussed with Dr. JAS Hidalgo.    Disposition: Remain in room 1022.    FOLLOW-UP     Call back the Rapid Response Nurse, Charis Barfield RN at [phone] for additional questions or concerns.

## 2020-10-05 NOTE — PROGRESS NOTES
Pupils unequal in size. LT side pupil larger than RT side. Change from assessment from this AM. Pupils equal this AM.     Pt spouse but lubricant eye drops in eye earlier this AM. Educated spouse to hold off and to get eye drops (no med, just lubricant) ordered by MD's.    Dr. Martin bedside and saw pupils. MD said this happened in the ICU and had stat ct done due this to. Pt able to follow commands appropriately.     Wctm.

## 2020-10-05 NOTE — PLAN OF CARE
POC reviewed with pt & spouse, verbalized understanding. VSS on 4L NC, t max 100.3. AAOX4. Remains free of falls and injury. Worked w/ PT/OT today    - purewick, redness and irritation noted, barrier applied, good urine  - edema noted, worse on RT hand, MD aware, US ordered  - jaundice   - see previous not about pupils   - KERRY drains X 2 w/ yellow drainage, one not holding suction, Dr Martin aware   - G tube gravity w/ yellow thick drainage   - pouch X 2 intact, minimal output   - wound vac in place, changed by WC today  - RT UA PICC  - added continuous pulse ox and CXR today due to SOB complaints and tachypnea  - alternating foot drop boot Q 2 hrs     TPN and IV abx. Strict NPO, denies nausea. Pain controlled. Telemetry. ACCU Q 4. IS/Acapella bedside, prn resp tx. Patient denies chest pain & SOB. SCDS placed on pt. No acute events or distress noted. Bed in lowest position, call light in reach, frequent rounds made for safety.     Cecelia.

## 2020-10-05 NOTE — PLAN OF CARE
Problem: Occupational Therapy Goal  Goal: Occupational Therapy Goal  Description: Goals to be met by: 10/19/20     Patient will increase functional independence with ADLs by performing:    UE Dressing with min A  LE Dressing with min A  Grooming while sitting EOB with min A-MET 10/5  Toileting from BS  with min A for hygiene and clothing management.   Toilet transfer to BS with min A  Pt will tolerate sitting EOB with VSS with CGA while engaging in functional reaching tasks     Outcome: Ongoing, Progressing     Goals reviewed and updated. Continue POC    PAULINE Rob  10/5/2020   Pager #: [phone]

## 2020-10-05 NOTE — SUBJECTIVE & OBJECTIVE
Interval History: No acute events overnight. 4L NC. NPO.    Medications:  Continuous Infusions:   TPN ADULT CENTRAL LINE CUSTOM 60 mL/hr at 10/04/20 2229    TPN ADULT CENTRAL LINE CUSTOM       Scheduled Meds:   bisacodyL  10 mg Rectal Daily    fat emulsion 20%  250 mL Intravenous Daily    fluconazole (DIFLUCAN) IVPB  400 mg Intravenous Q24H    heparin (porcine)  5,000 Units Subcutaneous Q8H    levothyroxine  40 mcg Intravenous Daily    lipid (SMOFLIPID)  250 mL Intravenous Daily    meropenem (MERREM) IVPB  1 g Intravenous Q8H    pantoprazole  40 mg Intravenous Daily    sodium chloride 0.9%  10 mL Intravenous Q6H    tacrolimus  0.5 mg Oral Daily AM     PRN Meds:acetaminophen, albuterol-ipratropium, calcium gluconate IVPB, calcium gluconate IVPB, calcium gluconate IVPB, dextrose 50%, dextrose 50%, glucagon (human recombinant), HYDROmorphone, insulin aspart U-100, iohexol, labetaloL, magnesium sulfate IVPB, magnesium sulfate IVPB, ondansetron, potassium chloride in water **AND** potassium chloride in water **AND** potassium chloride in water, promethazine, sodium chloride 0.9%, Flushing PICC Protocol **AND** sodium chloride 0.9% **AND** sodium chloride 0.9%, sodium phosphate IVPB, sodium phosphate IVPB, sodium phosphate IVPB     Review of patient's allergies indicates:   Allergen Reactions    Codeine Itching     Other reaction(s): Itching    Lipitor [atorvastatin] Other (See Comments)     Other reaction(s): Muscle pain  Muscle cranmps    Morphine Itching     Other reaction(s): nausea and vomiting     Zoloft [sertraline] Other (See Comments)     Tremors/muscle spasms     Objective:     Vital Signs (Most Recent):  Temp: 98.9 °F (37.2 °C) (10/05/20 0407)  Pulse: 97 (10/05/20 0700)  Resp: 18 (10/05/20 0539)  BP: (!) 143/67 (10/05/20 0411)  SpO2: 95 % (10/05/20 0407) Vital Signs (24h Range):  Temp:  [98.9 °F (37.2 °C)-99.4 °F (37.4 °C)] 98.9 °F (37.2 °C)  Pulse:  [] 97  Resp:  [18-25] 18  SpO2:  [93  %-96 %] 95 %  BP: (138-169)/(67-85) 143/67     Weight: 67.8 kg (149 lb 7.6 oz)  Body mass index is 30.19 kg/m².    Intake/Output - Last 3 Shifts       10/03 0700 - 10/04 0659 10/04 0700 - 10/05 0659 10/05 0700 - 10/06 0659    P.O.       Blood  0     NG/GT  0     IV Piggyback 500 400      2048.6     Total Intake(mL/kg) 1244 (18.3) 2448.6 (36.1)     Urine (mL/kg/hr) 2500 (1.5) 2251 (1.4)     Emesis/NG output  0     Drains 254 52.5     Other 80 0     Stool 0 0     Blood  0     Total Output 2834 2303.5     Net -1590 +145.1            Urine Occurrence  0 x     Stool Occurrence 0 x 0 x     Emesis Occurrence  0 x           Physical Exam  Vitals signs and nursing note reviewed.   Constitutional:       Appearance: She is ill-appearing.   HENT:      Head: Normocephalic and atraumatic.      Mouth/Throat:      Mouth: Mucous membranes are dry.   Eyes:      General: No scleral icterus.  Cardiovascular:      Rate and Rhythm: Normal rate and regular rhythm.      Heart sounds: Normal heart sounds.   Abdominal:      General: There is distension.      Palpations: Abdomen is soft.      Comments: Midline incision with wound vac in place; excellent seal to wound vac.   L abd drains in place with mild serous output; G tube to gravity.   Skin:     General: Skin is warm and dry.      Coloration: Skin is not jaundiced.   Neurological:      Mental Status: She is alert.      Cranial Nerves: No cranial nerve deficit.      Sensory: No sensory deficit.      Motor: No weakness.         Significant Labs:  CBC:   Recent Labs   Lab 10/05/20  0415   WBC 11.49   RBC 2.86*   HGB 8.0*   HCT 26.7*      MCV 93   MCH 28.0   MCHC 30.0*     CMP:   Recent Labs   Lab 10/05/20  0415      CALCIUM 8.6*   ALBUMIN 1.7*   PROT 6.3      K 3.3*   CO2 25      BUN 25*   CREATININE 0.6   ALKPHOS 587*   ALT 37   AST 55*   BILITOT 4.9*       Significant Diagnostics:  I have reviewed all pertinent imaging results/findings within the past 24  hours.

## 2020-10-05 NOTE — PROGRESS NOTES
Noticed increased swelling to RT hand/arm. Elevated with pillow. Swelling present prior to shift, however worsening.     Notified Dr. Madina ARTHUR said she will come assess.    Wctm.

## 2020-10-05 NOTE — PLAN OF CARE
Patient admitted in transfer from [facility] 9/4/2020.  Patient has undergone several surgeries, most recent patient is POD #17 from Ex Lap, Abd Washout, Repair Gastric Perf, G tube and Wound VAC placement.  Patient transferred from ICU to [place] [address] 10/4/2020.  Patient to have G tube study today, advance to sips, is on TPN and will have wound vac change with Wound Care today.  Patient will need LTAC placement at discharge +/- 10/7/2020.  Visited with patient and  in room to discuss LTAC placement.  Stated they prefer to got to [facility] as first choice.  Referral sent.    Will send referrals and continue to follow for discharge needs.       10/05/20 0969   Discharge Reassessment   Assessment Type Discharge Planning Reassessment   Discharge Plan A Long-term acute care facility (LTAC)   Discharge Plan B Home Health   Anticipated Discharge Disposition LTAC   Post-Acute Status   Post-Acute Authorization Placement   Post-Acute Placement Status Awaiting Internal Medical Clearance

## 2020-10-05 NOTE — PT/OT/SLP PROGRESS
Physical Therapy Treatment    Patient Name:  Elda Hernandez   MRN:  [id number]    Recommendations:     Discharge Recommendations:  nursing facility, skilled   Discharge Equipment Recommendations: bedside commode, walker, rolling, bath bench   Barriers to discharge: decreased functional mobility    Assessment:     Elda Hernandez is a 50 y.o. female admitted with a medical diagnosis of Perforated abdominal viscus.  She presents with the following impairments/functional limitations:  weakness, impaired endurance, impaired self care skills, impaired functional mobilty, gait instability, impaired balance, impaired cardiopulmonary response to activity.  Tolerated session with c/o weakness and SOB. Pt with c/o SOB following transfer to sitting EOB but O2 sats remained above 90 majority of session.  Pt demo improved mobility on this date - decreased assistance level needed.  Performed mobility with mod A - CGA.  Pt able to complete sit<>stand twice on this date but only able to tolerate standing for limited amount of time.  Attempted to take lateral steps but pt unable secondary to poor standing tolerance.  Pt safe to transfer to sitting EOB with assistance of 1x person.  Pt would benefit from continued skilled acute PT 4x/wk to improve functional mobility.      Rehab Prognosis: Good; patient would benefit from acute skilled PT services to address these deficits and reach maximum level of function.    Recent Surgery: Procedure(s) (LRB):  LAPAROTOMY, EXPLORATORY, DRAINAGE OF ABSCESS, REPAIR OF GASTRIC PERFORATION, GASTROSTOMY TUBE PLACEMENT (N/A)  APPLICATION, WOUND VAC (N/A) 17 Days Post-Op    Plan:     During this hospitalization, patient to be seen 4 x/week to address the identified rehab impairments via gait training, therapeutic activities, therapeutic exercises, neuromuscular re-education and progress toward the following goals:    · Plan of Care Expires:  11/01/20    Subjective     Chief Complaint:  "SOB  Patient/Family Comments/goals: "I don't want to fall." - when asked to perform sit<>stand  Pain/Comfort:  · Pain Rating 1: 0/10      Objective:     Communicated with RN and OT prior to session.  Patient found HOB elevated with wound vac, telemetry, PICC line, PureWick, KERRY drain, pulse ox (continuous), oxygen upon PT entry to room.     General Precautions: Standard, fall   Orthopedic Precautions:N/A   Braces: N/A     Functional Mobility:  · Bed Mobility:     · Rolling Right: moderate assistance  · Scooting: moderate assistance  · Supine to Sit: moderate assistance  · Sit to Supine: moderate assistance  · Transfers:     · Sit to Stand:  contact guard assistance with hand-held assist  · Gait: attempted to take steps along EOB with HHAx2 but unable secondary to poor standing balance.  Pt demo B knee buckling during attempt  · Balance: sitting (S); standing (CGA-mod A)      AM-PAC 6 CLICK MOBILITY  Turning over in bed (including adjusting bedclothes, sheets and blankets)?: 3  Sitting down on and standing up from a chair with arms (e.g., wheelchair, bedside commode, etc.): 3  Moving from lying on back to sitting on the side of the bed?: 2  Moving to and from a bed to a chair (including a wheelchair)?: 2  Need to walk in hospital room?: 1  Climbing 3-5 steps with a railing?: 1  Basic Mobility Total Score: 12       Therapeutic Activities and Exercises:  Pt educated on: PT role/POC; safety c mobility; benefits of OOB activities; performing therex; d/c recs - v/u  -sat EOB x15min  -sit<>stand 2x  -standing ~30sec  -therex (LAQ)  -pt's initial pulse ox reading not consistent, upon assessment, pt's L hand cold, pulse ox placed on R hand and readings more accurate (remained 90+)    Patient left HOB elevated with all lines intact, call button in reach, RN notified and  present..    GOALS:   Multidisciplinary Problems     Physical Therapy Goals        Problem: Physical Therapy Goal    Goal Priority Disciplines Outcome " Goal Variances Interventions   Physical Therapy Goal     PT, PT/OT Ongoing, Progressing     Description: Goals to be met by: 10/15/2020     Patient will increase functional independence with mobility by performin. Supine to sit with Moderate Assistance - MET 10/5/2020  2. Rolling to Left and Right with Moderate Assistance. - MET 10/5/2020  3. Sit to stand transfer with Moderate Assistance - MET 10/5/2020  4. Sitting at edge of bed x10 minutes with Stand-by Assistance - MET 10/5/2020    REVISED:    1. Supine to sit with Set-up [place]  2. Sit to supine with Set-up [place]  3. Sit to stand transfer with Supervision  4. Bed to chair transfer with Minimal Assistance using Rolling Walker  5. Gait  x 50 feet with Minimal Assistance using Rolling Walker.                      Time Tracking:     PT Received On: 10/05/20  PT Start Time: 1130     PT Stop Time: 1208  PT Total Time (min): 38 min     Billable Minutes: Therapeutic Activity 30 min and Therapeutic Exercise 8 min    Treatment Type: Treatment  PT/PTA: PT     PTA Visit Number: 0     John Cruz PT  10/05/2020

## 2020-10-05 NOTE — NURSING
POC reviewed with pt. VSS on 4L NC. AAO. Remains free of falls and injury.     Abdominal midline WV in place putting out yellow serous drainage to cannister. Wound pouche and ostomy bags dry and intact putting out thin yellow drainage. KERRY drains to L abdominal patent, charged, and intact. G tube to dependent drainage maintained with yellow drainage noted to gravity bag. R UA PICC line CDI infusing TPN and lipids at 60. Purwick in place suctioning concentrated yellow urine to suction cannister.     NPO - denies nausea. Pain controlled with PRN medications per MAR. Telemetry being monitored running NSR to sinus tachycardia. Blood glucose being monitored every 4 hours with no coverage needed. Encouraged IS use. Patient denies chest pain & SOB. Pt refusing heel offloading boots and SCDs. Frequent turning with X 2 assistance.    No acute events. No distress noted. Bed in lowest position, call light within reach, frequent rounds made for safety.

## 2020-10-06 LAB
ACANTHOCYTES BLD QL SMEAR: ABNORMAL
ALBUMIN SERPL BCP-MCNC: 1.8 G/DL (ref 3.5–5.2)
ALP SERPL-CCNC: 562 U/L (ref 55–135)
ALT SERPL W/O P-5'-P-CCNC: 38 U/L (ref 10–44)
ANION GAP SERPL CALC-SCNC: 9 MMOL/L (ref 8–16)
ANISOCYTOSIS BLD QL SMEAR: ABNORMAL
AST SERPL-CCNC: 56 U/L (ref 10–40)
AUER BODIES BLD QL SMEAR: ABNORMAL
BASO STIPL BLD QL SMEAR: ABNORMAL
BASOPHILS # BLD AUTO: 0.14 K/UL (ref 0–0.2)
BASOPHILS # BLD AUTO: ABNORMAL K/UL (ref 0–0.2)
BASOPHILS NFR BLD: 1 % (ref 0–1.9)
BASOPHILS NFR BLD: ABNORMAL % (ref 0–1.9)
BILIRUB SERPL-MCNC: 4.6 MG/DL (ref 0.1–1)
BLASTS NFR BLD MANUAL: ABNORMAL %
BUN SERPL-MCNC: 25 MG/DL (ref 6–20)
BURR CELLS BLD QL SMEAR: ABNORMAL
CABOT RINGS BLD QL SMEAR: ABNORMAL
CALCIUM SERPL-MCNC: 8.8 MG/DL (ref 8.7–10.5)
CHLORIDE SERPL-SCNC: 107 MMOL/L (ref 95–110)
CO2 SERPL-SCNC: 27 MMOL/L (ref 23–29)
CREAT SERPL-MCNC: 0.6 MG/DL (ref 0.5–1.4)
DACRYOCYTES BLD QL SMEAR: ABNORMAL
DIFFERENTIAL METHOD: ABNORMAL
DIFFERENTIAL METHOD: ABNORMAL
DOHLE BOD BLD QL SMEAR: ABNORMAL
EOSINOPHIL # BLD AUTO: 0.7 K/UL (ref 0–0.5)
EOSINOPHIL # BLD AUTO: ABNORMAL K/UL (ref 0–0.5)
EOSINOPHIL NFR BLD: 4.6 % (ref 0–8)
EOSINOPHIL NFR BLD: ABNORMAL % (ref 0–8)
ERYTHROCYTE [DISTWIDTH] IN BLOOD BY AUTOMATED COUNT: 17.4 % (ref 11.5–14.5)
ERYTHROCYTE [DISTWIDTH] IN BLOOD BY AUTOMATED COUNT: ABNORMAL % (ref 11.5–14.5)
EST. GFR  (AFRICAN AMERICAN): >60 ML/MIN/1.73 M^2
EST. GFR  (NON AFRICAN AMERICAN): >60 ML/MIN/1.73 M^2
GIANT PLATELETS BLD QL SMEAR: ABNORMAL
GLUCOSE SERPL-MCNC: 107 MG/DL (ref 70–110)
HCT VFR BLD AUTO: 27.1 % (ref 37–48.5)
HCT VFR BLD AUTO: ABNORMAL % (ref 37–48.5)
HEINZ BOD BLD QL SMEAR: ABNORMAL
HGB BLD-MCNC: 8.1 G/DL (ref 12–16)
HGB BLD-MCNC: ABNORMAL G/DL (ref 12–16)
HGB C CRY RBC QL MICRO: ABNORMAL
HOWELL-JOLLY BOD BLD QL SMEAR: ABNORMAL
HYPOCHROMIA BLD QL SMEAR: ABNORMAL
IMM GRANULOCYTES # BLD AUTO: 0.14 K/UL (ref 0–0.04)
IMM GRANULOCYTES # BLD AUTO: ABNORMAL K/UL (ref 0–0.04)
IMM GRANULOCYTES NFR BLD AUTO: 1 % (ref 0–0.5)
IMM GRANULOCYTES NFR BLD AUTO: ABNORMAL % (ref 0–0.5)
LYMPHOCYTES # BLD AUTO: 1.4 K/UL (ref 1–4.8)
LYMPHOCYTES # BLD AUTO: ABNORMAL K/UL (ref 1–4.8)
LYMPHOCYTES NFR BLD: 10.1 % (ref 18–48)
LYMPHOCYTES NFR BLD: ABNORMAL % (ref 18–48)
MAGNESIUM SERPL-MCNC: 1.5 MG/DL (ref 1.6–2.6)
MCH RBC QN AUTO: 27.6 PG (ref 27–31)
MCH RBC QN AUTO: ABNORMAL PG (ref 27–31)
MCHC RBC AUTO-ENTMCNC: 29.9 G/DL (ref 32–36)
MCHC RBC AUTO-ENTMCNC: ABNORMAL G/DL (ref 32–36)
MCV RBC AUTO: 93 FL (ref 82–98)
MCV RBC AUTO: ABNORMAL FL (ref 82–98)
MEGAKARYOCYTIC FRAGMENTS: ABNORMAL
METAMYELOCYTES NFR BLD MANUAL: ABNORMAL %
MONOCYTES # BLD AUTO: 1.9 K/UL (ref 0.3–1)
MONOCYTES # BLD AUTO: ABNORMAL K/UL (ref 0.3–1)
MONOCYTES NFR BLD: 13.3 % (ref 4–15)
MONOCYTES NFR BLD: ABNORMAL % (ref 4–15)
MYELOCYTES NFR BLD MANUAL: ABNORMAL %
NEUTROPHILS # BLD AUTO: 10 K/UL (ref 1.8–7.7)
NEUTROPHILS # BLD AUTO: ABNORMAL K/UL (ref 1.8–7.7)
NEUTROPHILS NFR BLD: 70 % (ref 38–73)
NEUTROPHILS NFR BLD: ABNORMAL % (ref 38–73)
NEUTS BAND NFR BLD MANUAL: ABNORMAL %
NRBC BLD-RTO: 0 /100 WBC
NRBC BLD-RTO: ABNORMAL /100 WBC
OVALOCYTES BLD QL SMEAR: ABNORMAL
PAPPENHEIMER BOD BLD QL SMEAR: ABNORMAL
PHOSPHATE SERPL-MCNC: 3.1 MG/DL (ref 2.7–4.5)
PLASMODIUM BLD QL SMEAR: ABNORMAL
PLATELET # BLD AUTO: 216 K/UL (ref 150–350)
PLATELET # BLD AUTO: ABNORMAL K/UL (ref 150–350)
PLATELET BLD QL SMEAR: ABNORMAL
PMV BLD AUTO: 13.3 FL (ref 9.2–12.9)
PMV BLD AUTO: ABNORMAL FL (ref 9.2–12.9)
POCT GLUCOSE: 111 MG/DL (ref 70–110)
POCT GLUCOSE: 112 MG/DL (ref 70–110)
POCT GLUCOSE: 113 MG/DL (ref 70–110)
POIKILOCYTOSIS BLD QL SMEAR: ABNORMAL
POLYCHROMASIA BLD QL SMEAR: ABNORMAL
POTASSIUM SERPL-SCNC: 3.9 MMOL/L (ref 3.5–5.1)
PROMYELOCYTES NFR BLD MANUAL: ABNORMAL %
PROT SERPL-MCNC: 6.6 G/DL (ref 6–8.4)
RBC # BLD AUTO: 2.93 M/UL (ref 4–5.4)
RBC # BLD AUTO: ABNORMAL M/UL (ref 4–5.4)
RBC AGGLUT BLD QL: ABNORMAL
ROULEAUX BLD QL SMEAR: ABNORMAL
SCHISTOCYTES BLD QL SMEAR: ABNORMAL
SCHISTOCYTES BLD QL SMEAR: ABNORMAL
SICKLE CELLS BLD QL SMEAR: ABNORMAL
SMUDGE CELLS BLD QL SMEAR: ABNORMAL
SODIUM SERPL-SCNC: 143 MMOL/L (ref 136–145)
SPHEROCYTES BLD QL SMEAR: ABNORMAL
STOMATOCYTES BLD QL SMEAR: ABNORMAL
TACROLIMUS BLD-MCNC: 4.1 NG/ML (ref 5–15)
TARGETS BLD QL SMEAR: ABNORMAL
TOXIC GRANULES BLD QL SMEAR: ABNORMAL
TRIGL SERPL-MCNC: 142 MG/DL (ref 30–150)
TRIGL SERPL-MCNC: NORMAL MG/DL (ref 30–150)
WBC # BLD AUTO: 14.28 K/UL (ref 3.9–12.7)
WBC # BLD AUTO: ABNORMAL K/UL (ref 3.9–12.7)
WBC NRBC COR # BLD: ABNORMAL K/UL (ref 3.9–12.7)
WBC OTHER NFR BLD MANUAL: ABNORMAL %
WBC TOXIC VACUOLES BLD QL SMEAR: ABNORMAL

## 2020-10-06 PROCEDURE — 63600175 PHARM REV CODE 636 W HCPCS: Performed by: SURGERY

## 2020-10-06 PROCEDURE — 94761 N-INVAS EAR/PLS OXIMETRY MLT: CPT

## 2020-10-06 PROCEDURE — 63600175 PHARM REV CODE 636 W HCPCS: Performed by: STUDENT IN AN ORGANIZED HEALTH CARE EDUCATION/TRAINING PROGRAM

## 2020-10-06 PROCEDURE — 93010 EKG 12-LEAD: ICD-10-PCS | Mod: ,,, | Performed by: INTERNAL MEDICINE

## 2020-10-06 PROCEDURE — 80053 COMPREHEN METABOLIC PANEL: CPT

## 2020-10-06 PROCEDURE — 25000003 PHARM REV CODE 250: Performed by: STUDENT IN AN ORGANIZED HEALTH CARE EDUCATION/TRAINING PROGRAM

## 2020-10-06 PROCEDURE — 27000646 HC AEROBIKA DEVICE

## 2020-10-06 PROCEDURE — 25500020 PHARM REV CODE 255: Performed by: SURGERY

## 2020-10-06 PROCEDURE — 85025 COMPLETE CBC W/AUTO DIFF WBC: CPT

## 2020-10-06 PROCEDURE — 20600001 HC STEP DOWN PRIVATE ROOM

## 2020-10-06 PROCEDURE — 83735 ASSAY OF MAGNESIUM: CPT

## 2020-10-06 PROCEDURE — 93010 ELECTROCARDIOGRAM REPORT: CPT | Mod: ,,, | Performed by: INTERNAL MEDICINE

## 2020-10-06 PROCEDURE — 94664 DEMO&/EVAL PT USE INHALER: CPT

## 2020-10-06 PROCEDURE — 93005 ELECTROCARDIOGRAM TRACING: CPT

## 2020-10-06 PROCEDURE — 80197 ASSAY OF TACROLIMUS: CPT

## 2020-10-06 PROCEDURE — 27000173 HC ACAPELLA DEVICE DH OR DM

## 2020-10-06 PROCEDURE — A4216 STERILE WATER/SALINE, 10 ML: HCPCS | Performed by: SURGERY

## 2020-10-06 PROCEDURE — 84100 ASSAY OF PHOSPHORUS: CPT

## 2020-10-06 PROCEDURE — A4217 STERILE WATER/SALINE, 500 ML: HCPCS | Performed by: STUDENT IN AN ORGANIZED HEALTH CARE EDUCATION/TRAINING PROGRAM

## 2020-10-06 PROCEDURE — C9113 INJ PANTOPRAZOLE SODIUM, VIA: HCPCS | Performed by: STUDENT IN AN ORGANIZED HEALTH CARE EDUCATION/TRAINING PROGRAM

## 2020-10-06 PROCEDURE — 27000221 HC OXYGEN, UP TO 24 HOURS

## 2020-10-06 PROCEDURE — 99900035 HC TECH TIME PER 15 MIN (STAT)

## 2020-10-06 PROCEDURE — B4185 PARENTERAL SOL 10 GM LIPIDS: HCPCS | Performed by: STUDENT IN AN ORGANIZED HEALTH CARE EDUCATION/TRAINING PROGRAM

## 2020-10-06 PROCEDURE — 25000003 PHARM REV CODE 250: Performed by: SURGERY

## 2020-10-06 PROCEDURE — 84478 ASSAY OF TRIGLYCERIDES: CPT

## 2020-10-06 PROCEDURE — 94799 UNLISTED PULMONARY SVC/PX: CPT

## 2020-10-06 RX ORDER — HYDRALAZINE HYDROCHLORIDE 25 MG/1
25 TABLET, FILM COATED ORAL EVERY 6 HOURS PRN
Status: DISCONTINUED | OUTPATIENT
Start: 2020-10-06 | End: 2020-10-06

## 2020-10-06 RX ORDER — OXYCODONE HYDROCHLORIDE 5 MG/1
5 TABLET ORAL EVERY 4 HOURS PRN
Status: DISCONTINUED | OUTPATIENT
Start: 2020-10-06 | End: 2020-10-14 | Stop reason: HOSPADM

## 2020-10-06 RX ORDER — HYDRALAZINE HYDROCHLORIDE 20 MG/ML
10 INJECTION INTRAMUSCULAR; INTRAVENOUS EVERY 4 HOURS PRN
Status: DISCONTINUED | OUTPATIENT
Start: 2020-10-06 | End: 2020-10-06

## 2020-10-06 RX ORDER — MUPIROCIN 20 MG/G
OINTMENT TOPICAL 2 TIMES DAILY
Status: DISCONTINUED | OUTPATIENT
Start: 2020-10-06 | End: 2020-10-11

## 2020-10-06 RX ORDER — ENALAPRILAT 1.25 MG/ML
1.25 INJECTION INTRAVENOUS EVERY 6 HOURS PRN
Status: DISCONTINUED | OUTPATIENT
Start: 2020-10-06 | End: 2020-10-14 | Stop reason: HOSPADM

## 2020-10-06 RX ADMIN — Medication 5 MG: at 01:10

## 2020-10-06 RX ADMIN — Medication 10 ML: at 06:10

## 2020-10-06 RX ADMIN — HEPARIN SODIUM 5000 UNITS: 5000 INJECTION INTRAVENOUS; SUBCUTANEOUS at 05:10

## 2020-10-06 RX ADMIN — FLUCONAZOLE 400 MG: 2 INJECTION, SOLUTION INTRAVENOUS at 05:10

## 2020-10-06 RX ADMIN — MEROPENEM AND SODIUM CHLORIDE 1 G: 1 INJECTION, SOLUTION INTRAVENOUS at 10:10

## 2020-10-06 RX ADMIN — IOHEXOL 200 ML: 350 INJECTION, SOLUTION INTRAVENOUS at 09:10

## 2020-10-06 RX ADMIN — HEPARIN SODIUM 5000 UNITS: 5000 INJECTION INTRAVENOUS; SUBCUTANEOUS at 01:10

## 2020-10-06 RX ADMIN — HYDROMORPHONE HYDROCHLORIDE 0.5 MG: 1 INJECTION, SOLUTION INTRAMUSCULAR; INTRAVENOUS; SUBCUTANEOUS at 11:10

## 2020-10-06 RX ADMIN — HYDROMORPHONE HYDROCHLORIDE 0.5 MG: 1 INJECTION, SOLUTION INTRAMUSCULAR; INTRAVENOUS; SUBCUTANEOUS at 04:10

## 2020-10-06 RX ADMIN — MUPIROCIN: 20 OINTMENT TOPICAL at 10:10

## 2020-10-06 RX ADMIN — OXYCODONE HYDROCHLORIDE 5 MG: 5 TABLET ORAL at 08:10

## 2020-10-06 RX ADMIN — HEPARIN SODIUM 5000 UNITS: 5000 INJECTION INTRAVENOUS; SUBCUTANEOUS at 10:10

## 2020-10-06 RX ADMIN — TACROLIMUS 0.3 MG: 1 CAPSULE, GELATIN COATED ORAL at 05:10

## 2020-10-06 RX ADMIN — LEVOTHYROXINE SODIUM ANHYDROUS 40 MCG: 100 INJECTION, POWDER, LYOPHILIZED, FOR SOLUTION INTRAVENOUS at 11:10

## 2020-10-06 RX ADMIN — TACROLIMUS 0.5 MG: 1 CAPSULE, GELATIN COATED ORAL at 10:10

## 2020-10-06 RX ADMIN — MEROPENEM AND SODIUM CHLORIDE 1 G: 1 INJECTION, SOLUTION INTRAVENOUS at 08:10

## 2020-10-06 RX ADMIN — PANTOPRAZOLE SODIUM 40 MG: 40 INJECTION, POWDER, LYOPHILIZED, FOR SOLUTION INTRAVENOUS at 11:10

## 2020-10-06 RX ADMIN — Medication 10 ML: at 12:10

## 2020-10-06 RX ADMIN — MAGNESIUM SULFATE HEPTAHYDRATE: 500 INJECTION, SOLUTION INTRAMUSCULAR; INTRAVENOUS at 10:10

## 2020-10-06 RX ADMIN — MUPIROCIN: 20 OINTMENT TOPICAL at 11:10

## 2020-10-06 RX ADMIN — ONDANSETRON 4 MG: 2 INJECTION INTRAMUSCULAR; INTRAVENOUS at 08:10

## 2020-10-06 RX ADMIN — SMOFLIPID 250 ML: 6; 6; 5; 3 INJECTION, EMULSION INTRAVENOUS at 10:10

## 2020-10-06 NOTE — PROGRESS NOTES
Tele called because HR dropped down to 40's again. Pt was sleeping. Nurse asked if pt felt ok, pt states she felt fine. HR 's. Notified rapid team to assess. Notified Dr. Hidalgo. Dr Hidalgo aware only gave 5 mg of labetalol instead of 10mg earlier.    tm.

## 2020-10-06 NOTE — PT/OT/SLP PROGRESS
Occupational Therapy      Patient Name:  Elda Hernandez   MRN:  [id number]    Patient not seen today secondary to Other (Comment)(TIFFANIE for procedure). Will follow-up as scheduled.    Stephanie Bautista OT  10/6/2020

## 2020-10-06 NOTE — PROGRESS NOTES
[facility] [address]  General Surgery  Progress Note    Subjective:     History of Present Illness:  Ms. Hernandez is a 51yo F w/PMH of von Gierke disease s/p liver transplant (2002, on tacro + MMF, follows Dr. Nielsen), hx chronic rejection (bx 2015 with acute and chronic rejection s/p steroids), biliary stricture and ductopenic rejection, recently with cirrhosis on fibroscan (10/2019), HTN, and depression who presents as a transfer for further evaluation of gastric outlet obstruction and esophageal stricturing.  Patient decompensated requiring multiple pressors and intubation. CT scan showed free air. Prior to intubation patient reported severe abdominal pain.   states that patient has had epigastric pain, nausea, and vomiting for several days.     Post-Op Info:  Procedure(s) (LRB):  LAPAROTOMY, EXPLORATORY, DRAINAGE OF ABSCESS, REPAIR OF GASTRIC PERFORATION, GASTROSTOMY TUBE PLACEMENT (N/A)  APPLICATION, WOUND VAC (N/A)   18 Days Post-Op     Interval History: No acute events overnight. Did not get g tube study yesterday. NPO.    Medications:  Continuous Infusions:   TPN ADULT CENTRAL LINE CUSTOM 60 mL/hr at 10/05/20 2203     Scheduled Meds:   bisacodyL  10 mg Rectal Daily    fluconazole (DIFLUCAN) IVPB  400 mg Intravenous Q24H    heparin (porcine)  5,000 Units Subcutaneous Q8H    levothyroxine  40 mcg Intravenous Daily    lipid (SMOFLIPID)  250 mL Intravenous Daily    meropenem (MERREM) IVPB  1 g Intravenous Q8H    pantoprazole  40 mg Intravenous Daily    sodium chloride 0.9%  10 mL Intravenous Q6H    tacrolimus  0.5 mg Oral Daily AM    tacrolimus  0.3 mg Oral Daily PM     PRN Meds:acetaminophen, albuterol-ipratropium, dextrose 50%, dextrose 50%, glucagon (human recombinant), HYDROmorphone, insulin aspart U-100, iohexol, labetaloL, ondansetron, promethazine, sodium chloride 0.9%, Flushing PICC Protocol **AND** sodium chloride 0.9% **AND** sodium chloride 0.9%     Review of patient's  allergies indicates:   Allergen Reactions    Codeine Itching     Other reaction(s): Itching    Lipitor [atorvastatin] Other (See Comments)     Other reaction(s): Muscle pain  Muscle cranmps    Morphine Itching     Other reaction(s): nausea and vomiting     Zoloft [sertraline] Other (See Comments)     Tremors/muscle spasms     Objective:     Vital Signs (Most Recent):  Temp: 99.8 °F (37.7 °C) (10/06/20 0713)  Pulse: 97 (10/06/20 0830)  Resp: (!) 25 (10/06/20 0713)  BP: (!) 160/70 (10/06/20 0830)  SpO2: (!) 92 % (10/06/20 0729) Vital Signs (24h Range):  Temp:  [98.6 °F (37 °C)-99.9 °F (37.7 °C)] 99.8 °F (37.7 °C)  Pulse:  [] 97  Resp:  [18-25] 25  SpO2:  [91 %-100 %] 92 %  BP: (142-177)/(67-86) 160/70     Weight: 67.8 kg (149 lb 7.6 oz)  Body mass index is 30.19 kg/m².    Intake/Output - Last 3 Shifts       10/04 0700 - 10/05 0659 10/05 0700 - 10/06 0659 10/06 0700 - 10/07 0659    P.O.  0     Blood 0      NG/GT 0 0     IV Piggyback 400 600     TPN 2048.6 1211     Total Intake(mL/kg) 2448.6 (36.1) 1811 (26.7)     Urine (mL/kg/hr) 2251 (1.4) 850 (0.5)     Emesis/NG output 0 0     Drains 52.5 137     Other 0 0     Stool 0 0     Blood 0      Total Output 2303.5 987     Net +145.1 +824            Urine Occurrence 0 x 7 x 1 x    Stool Occurrence 0 x 0 x 0 x    Emesis Occurrence 0 x 0 x 0 x          Physical Exam  Vitals signs and nursing note reviewed.   Constitutional:       Appearance: She is ill-appearing.   HENT:      Head: Normocephalic and atraumatic.      Mouth/Throat:      Mouth: Mucous membranes are dry.   Eyes:      General: No scleral icterus.  Cardiovascular:      Rate and Rhythm: Normal rate and regular rhythm.      Heart sounds: Normal heart sounds.   Abdominal:      General: There is distension.      Palpations: Abdomen is soft.      Comments: Midline incision with wound vac in place; excellent seal to wound vac.   L abd drains in place with mild serous output; G tube to gravity.   Skin:      General: Skin is warm and dry.      Coloration: Skin is not jaundiced.   Neurological:      Mental Status: She is alert.      Cranial Nerves: No cranial nerve deficit.      Sensory: No sensory deficit.      Motor: No weakness.         Significant Labs:  CBC:   Recent Labs   Lab 10/06/20  0437   WBC SEE COMMENT   RBC SEE COMMENT   HGB SEE COMMENT   HCT SEE COMMENT   PLT SEE COMMENT   MCV SEE COMMENT   MCH SEE COMMENT   MCHC SEE COMMENT     CMP:   Recent Labs   Lab 10/06/20  0715      CALCIUM 8.8   ALBUMIN 1.8*   PROT 6.6      K 3.9   CO2 27      BUN 25*   CREATININE 0.6   ALKPHOS 562*   ALT 38   AST 56*   BILITOT 4.6*       Significant Diagnostics:  I have reviewed all pertinent imaging results/findings within the past 24 hours.    Assessment/Plan:     * Perforated abdominal viscus  50 y.o. female w/ free air on CT scan. S/p emergent ex lap on 9/4 w/ findings of gastric perforation, primary repair. Now w/  abdominal closure on 9/6, skin stapled closed, KERRY drains removed. Class A to OR on 9/18 for free air and extrav or oral contrast seen on CT. CT on 9/25 redemonstrated contrast leaking from stomach, but well-drained with surgical drain.    NPO  G tube study today and if normal will advance diet to sips of clears  G tube and NG to gravity/suction  Wound vac changes w/ wound care  TPN  PT/OT    Dispo: Discharge planning, recommending SNF         Rocio Hidalgo MD  General Surgery  [facility]

## 2020-10-06 NOTE — CARE UPDATE
"RAPID RESPONSE NURSE PROACTIVE ROUNDING NOTE     Time of Visit: 1608    Admit Date: 9/3/2020  LOS: 33  Code Status: Full Code   Date of Visit: 10/06/2020  : 1970  Age: 50 y.o.  Sex: female  Race: White  Bed: [address] [place] [address] A:   MRN: [id number]  Was the patient discharged from an ICU this admission? yes   Was the patient discharged from a PACU within last 24 hours?  no  Did the patient receive conscious sedation/general anesthesia in last 24 hours?  no  Was the patient in the ED within the past 24 hours?  no  Was the patient started on NIPPV within the past 24 hours?  no  Attending Physician: Clark Rodriguez MD  Primary Service: Networked reference to record PCT     ASSESSMENT     Notified by bedside RN via phone call.  Reason for alert: Bradycardic    Diagnosis: Perforated abdominal viscus    Abnormal Vital Signs: BP (!) 145/80   Pulse 99   Temp 99.7 °F (37.6 °C) (Oral)   Resp (!) 22   Ht 4' 11" (1.499 m)   Wt 67.8 kg (149 lb 7.6 oz)   SpO2 95%   BMI 30.19 kg/m²      Clinical Issues: Dysrythmia    Patient  has a past medical history of Angiolipoma of kidney, Arnold-Chiari malformation, Depression, Esophageal stricture, Essential tremor, Hypertension, Left bundle branch block, Liver fibrosis, transplanted liver, Migraine without aura, MVP (mitral valve prolapse), Non-rheumatic mitral regurgitation, Non-rheumatic tricuspid valve insufficiency, Osteoporosis, Recurrent urinary tract infection, Seizures, Shingles, SIADH (syndrome of inappropriate ADH production), Squamous cell carcinoma, Tricuspid valve prolapse, Urolithiasis, and Von Gierke disease.      Pt 18 days post op from ex lap for drainage of abscess and G tube placement. Per bedside RNAllison, pts HR consistently 90-100s and will acutely drop to 40s. Pt usually sleeping during these times. Upon my assessment, pt states she does not feel any different during these episodes, reports no loss of consciousness, and does not feel dizzy or lightheaded. BP " remains stable during and events typically last < 15 seconds. Pt current HR on my assessment is . Pt has been receiving labetalol to maintain SYS < 160. Labetalol then d/c'ed by primary team and med changed to enalaprilat to maintain BP. Primary team aware of events.     INTERVENTIONS/ RECOMMENDATIONS     Keep pt on continuous tele. If bradycardia persists, suggests cardiology consult.    Discussed plan of care with RNAllison.    PHYSICIAN ESCALATION     Yes/No  no    Orders received and case discussed with NA.    Disposition: Remain in room 1022.    FOLLOW-UP     Call back the Rapid Response Nurse, [place] [phone] for additional questions or concerns.

## 2020-10-06 NOTE — NURSING
POC reviewed with pt. VSS on 4L NC- Labetolol adminstered for HTN. AAO. Remains free of falls and injury.      Abdominal midline WV in place putting out no drainage to cannister. Wound pouch and ostomy bags dry and intact putting out thin yellow drainage. KERRY drains to L abdomen CDI. G tube CDI maintained to dependent drainage.. R UA PICC line CDI infusing TPN and lipids at 60. Urinating adequately to sonu pads.     NPO - denies nausea. Pain controlled with PRN medications per MAR. Telemetry being monitored running NSR to sinus tachycardia with occasional moments of bradycardia - MD aware. Blood glucose being monitored every 4 hours with no coverage needed. Encouraged IS use. Patient denies chest pain & SOB. Pt refusing heel offloading boots and SCDs. Frequent turning with X 2 assistance.     No acute events. No distress noted. Bed in lowest position, call light within reach, frequent rounds made for safety.

## 2020-10-06 NOTE — NURSING
Momentary bradycardia in 40s noted to monitor several times. Pt asymptomatic. Paged oncall. Stated to keep monitoring patient and notify him of any change in status. WCTM.

## 2020-10-06 NOTE — ASSESSMENT & PLAN NOTE
50 y.o. female w/ free air on CT scan. S/p emergent ex lap on 9/4 w/ findings of gastric perforation, primary repair. Now w/  abdominal closure on 9/6, skin stapled closed, KERRY drains removed. Class A to OR on 9/18 for free air and extrav or oral contrast seen on CT. CT on 9/25 redemonstrated contrast leaking from stomach, but well-drained with surgical drain.    NPO  G tube study today and if normal will advance diet to sips of clears  G tube and NG to gravity/suction  Wound vac changes w/ wound care  TPN  PT/OT    Dispo: Discharge planning, recommending SNF

## 2020-10-06 NOTE — PLAN OF CARE
POC reviewed with pt & spouse, verbalized understanding. VSS on 4L NC, t max 100.3. AAOX3, intermittently confused/delirious, Dr Hidalgo aware. Remains free of falls and injury.      - purewick, redness/irritation noted, barrier applied, concentrated urine  - elevated RT hand  - jaundice   - KERRY drains X 2 w/ yellow drainage  - G tube gravity w/ yellow thick drainage   - pouch X 2 intact, minimal output   - wound vac in place   - RT UA PICC  - alternating foot drop boot Q 2 hrs   - scan today  - nursing dysphagia screen for clear liquid diet     TPN and IV abx. Denies nausea. Pain controlled. Telemetry, see previous notes about HR. ACCU Q 6. IS/Acapella bedside, prn resp tx. Patient denies chest pain & SOB. SCDS placed on pt. No acute events or distress noted. Bed in lowest position, call light in reach, frequent rounds made for safety.      Wctm.

## 2020-10-06 NOTE — PROGRESS NOTES
Labs had to be redrawn from night shift nurse.     Tele called stating HR dropped down to 40's but increased to 110's after. Pt denies symptoms and was found sleeping. Other vitals stable on 4 L NC. Per previous note this has occurred before.     Notified Dr. Hidalgo.     [facility]

## 2020-10-06 NOTE — PLAN OF CARE
Chart reviewed. Afebrile overnight. Ct 10/3 concerning for superimposed peritonitis in this pt with known perforated viscus. No new culture data.    --consider de-escalating abx to ceftriaxone/ flagyl and fluconazole  --strongly recommend draining loculated peritoneal fluid. Unable to determine abx duration at this time without adequate source control.   --please call ID if new culture data obtained to help guide abx therapy.     Will sign off for now.    Trinidad Weber PGY4  Infectious Disease

## 2020-10-06 NOTE — SUBJECTIVE & OBJECTIVE
Interval History: No acute events overnight. Did not get g tube study yesterday. NPO.    Medications:  Continuous Infusions:   TPN ADULT CENTRAL LINE CUSTOM 60 mL/hr at 10/05/20 2203     Scheduled Meds:   bisacodyL  10 mg Rectal Daily    fluconazole (DIFLUCAN) IVPB  400 mg Intravenous Q24H    heparin (porcine)  5,000 Units Subcutaneous Q8H    levothyroxine  40 mcg Intravenous Daily    lipid (SMOFLIPID)  250 mL Intravenous Daily    meropenem (MERREM) IVPB  1 g Intravenous Q8H    pantoprazole  40 mg Intravenous Daily    sodium chloride 0.9%  10 mL Intravenous Q6H    tacrolimus  0.5 mg Oral Daily AM    tacrolimus  0.3 mg Oral Daily PM     PRN Meds:acetaminophen, albuterol-ipratropium, dextrose 50%, dextrose 50%, glucagon (human recombinant), HYDROmorphone, insulin aspart U-100, iohexol, labetaloL, ondansetron, promethazine, sodium chloride 0.9%, Flushing PICC Protocol **AND** sodium chloride 0.9% **AND** sodium chloride 0.9%     Review of patient's allergies indicates:   Allergen Reactions    Codeine Itching     Other reaction(s): Itching    Lipitor [atorvastatin] Other (See Comments)     Other reaction(s): Muscle pain  Muscle cranmps    Morphine Itching     Other reaction(s): nausea and vomiting     Zoloft [sertraline] Other (See Comments)     Tremors/muscle spasms     Objective:     Vital Signs (Most Recent):  Temp: 99.8 °F (37.7 °C) (10/06/20 0713)  Pulse: 97 (10/06/20 0830)  Resp: (!) 25 (10/06/20 0713)  BP: (!) 160/70 (10/06/20 0830)  SpO2: (!) 92 % (10/06/20 0729) Vital Signs (24h Range):  Temp:  [98.6 °F (37 °C)-99.9 °F (37.7 °C)] 99.8 °F (37.7 °C)  Pulse:  [] 97  Resp:  [18-25] 25  SpO2:  [91 %-100 %] 92 %  BP: (142-177)/(67-86) 160/70     Weight: 67.8 kg (149 lb 7.6 oz)  Body mass index is 30.19 kg/m².    Intake/Output - Last 3 Shifts       10/04 0700 - 10/05 0659 10/05 0700 - 10/06 0659 10/06 0700 - 10/07 0659    P.O.  0     Blood 0      NG/GT 0 0     IV Piggyback 400 600     TPN 2048.6  1211     Total Intake(mL/kg) 2448.6 (36.1) 1811 (26.7)     Urine (mL/kg/hr) 2251 (1.4) 850 (0.5)     Emesis/NG output 0 0     Drains 52.5 137     Other 0 0     Stool 0 0     Blood 0      Total Output 2303.5 987     Net +145.1 +824            Urine Occurrence 0 x 7 x 1 x    Stool Occurrence 0 x 0 x 0 x    Emesis Occurrence 0 x 0 x 0 x          Physical Exam  Vitals signs and nursing note reviewed.   Constitutional:       Appearance: She is ill-appearing.   HENT:      Head: Normocephalic and atraumatic.      Mouth/Throat:      Mouth: Mucous membranes are dry.   Eyes:      General: No scleral icterus.  Cardiovascular:      Rate and Rhythm: Normal rate and regular rhythm.      Heart sounds: Normal heart sounds.   Abdominal:      General: There is distension.      Palpations: Abdomen is soft.      Comments: Midline incision with wound vac in place; excellent seal to wound vac.   L abd drains in place with mild serous output; G tube to gravity.   Skin:     General: Skin is warm and dry.      Coloration: Skin is not jaundiced.   Neurological:      Mental Status: She is alert.      Cranial Nerves: No cranial nerve deficit.      Sensory: No sensory deficit.      Motor: No weakness.         Significant Labs:  CBC:   Recent Labs   Lab 10/06/20  0437   WBC SEE COMMENT   RBC SEE COMMENT   HGB SEE COMMENT   HCT SEE COMMENT   PLT SEE COMMENT   MCV SEE COMMENT   MCH SEE COMMENT   MCHC SEE COMMENT     CMP:   Recent Labs   Lab 10/06/20  0715      CALCIUM 8.8   ALBUMIN 1.8*   PROT 6.6      K 3.9   CO2 27      BUN 25*   CREATININE 0.6   ALKPHOS 562*   ALT 38   AST 56*   BILITOT 4.6*       Significant Diagnostics:  I have reviewed all pertinent imaging results/findings within the past 24 hours.

## 2020-10-07 LAB
ALBUMIN SERPL BCP-MCNC: 1.7 G/DL (ref 3.5–5.2)
ALP SERPL-CCNC: 594 U/L (ref 55–135)
ALT SERPL W/O P-5'-P-CCNC: 47 U/L (ref 10–44)
ANION GAP SERPL CALC-SCNC: 10 MMOL/L (ref 8–16)
AST SERPL-CCNC: 81 U/L (ref 10–40)
BACTERIA BLD CULT: NORMAL
BASOPHILS # BLD AUTO: 0.09 K/UL (ref 0–0.2)
BASOPHILS NFR BLD: 0.7 % (ref 0–1.9)
BILIRUB SERPL-MCNC: 4.2 MG/DL (ref 0.1–1)
BUN SERPL-MCNC: 27 MG/DL (ref 6–20)
CALCIUM SERPL-MCNC: 8.4 MG/DL (ref 8.7–10.5)
CHLORIDE SERPL-SCNC: 107 MMOL/L (ref 95–110)
CO2 SERPL-SCNC: 25 MMOL/L (ref 23–29)
CREAT SERPL-MCNC: 0.6 MG/DL (ref 0.5–1.4)
DIFFERENTIAL METHOD: ABNORMAL
EOSINOPHIL # BLD AUTO: 0.5 K/UL (ref 0–0.5)
EOSINOPHIL NFR BLD: 4.3 % (ref 0–8)
ERYTHROCYTE [DISTWIDTH] IN BLOOD BY AUTOMATED COUNT: 17.8 % (ref 11.5–14.5)
EST. GFR  (AFRICAN AMERICAN): >60 ML/MIN/1.73 M^2
EST. GFR  (NON AFRICAN AMERICAN): >60 ML/MIN/1.73 M^2
GLUCOSE SERPL-MCNC: 180 MG/DL (ref 70–110)
HCT VFR BLD AUTO: 25 % (ref 37–48.5)
HGB BLD-MCNC: 7.8 G/DL (ref 12–16)
IMM GRANULOCYTES # BLD AUTO: 0.17 K/UL (ref 0–0.04)
IMM GRANULOCYTES NFR BLD AUTO: 1.4 % (ref 0–0.5)
LYMPHOCYTES # BLD AUTO: 1.2 K/UL (ref 1–4.8)
LYMPHOCYTES NFR BLD: 9.7 % (ref 18–48)
MAGNESIUM SERPL-MCNC: 1.7 MG/DL (ref 1.6–2.6)
MCH RBC QN AUTO: 29.5 PG (ref 27–31)
MCHC RBC AUTO-ENTMCNC: 31.2 G/DL (ref 32–36)
MCV RBC AUTO: 95 FL (ref 82–98)
MONOCYTES # BLD AUTO: 1.5 K/UL (ref 0.3–1)
MONOCYTES NFR BLD: 12.1 % (ref 4–15)
NEUTROPHILS # BLD AUTO: 8.8 K/UL (ref 1.8–7.7)
NEUTROPHILS NFR BLD: 71.8 % (ref 38–73)
NRBC BLD-RTO: 0 /100 WBC
PHOSPHATE SERPL-MCNC: 3.8 MG/DL (ref 2.7–4.5)
PLATELET # BLD AUTO: 165 K/UL (ref 150–350)
PMV BLD AUTO: 13.6 FL (ref 9.2–12.9)
POCT GLUCOSE: 108 MG/DL (ref 70–110)
POCT GLUCOSE: 110 MG/DL (ref 70–110)
POCT GLUCOSE: 98 MG/DL (ref 70–110)
POCT GLUCOSE: 99 MG/DL (ref 70–110)
POTASSIUM SERPL-SCNC: 4.9 MMOL/L (ref 3.5–5.1)
PROT SERPL-MCNC: 6.7 G/DL (ref 6–8.4)
RBC # BLD AUTO: 2.64 M/UL (ref 4–5.4)
SODIUM SERPL-SCNC: 142 MMOL/L (ref 136–145)
TACROLIMUS BLD-MCNC: 3.2 NG/ML (ref 5–15)
TRIGL SERPL-MCNC: 409 MG/DL (ref 30–150)
WBC # BLD AUTO: 12.19 K/UL (ref 3.9–12.7)

## 2020-10-07 PROCEDURE — 97530 THERAPEUTIC ACTIVITIES: CPT | Mod: CQ

## 2020-10-07 PROCEDURE — 84100 ASSAY OF PHOSPHORUS: CPT

## 2020-10-07 PROCEDURE — 94761 N-INVAS EAR/PLS OXIMETRY MLT: CPT

## 2020-10-07 PROCEDURE — 85025 COMPLETE CBC W/AUTO DIFF WBC: CPT

## 2020-10-07 PROCEDURE — 63600175 PHARM REV CODE 636 W HCPCS: Performed by: SURGERY

## 2020-10-07 PROCEDURE — 25000003 PHARM REV CODE 250: Performed by: STUDENT IN AN ORGANIZED HEALTH CARE EDUCATION/TRAINING PROGRAM

## 2020-10-07 PROCEDURE — 80053 COMPREHEN METABOLIC PANEL: CPT

## 2020-10-07 PROCEDURE — 83735 ASSAY OF MAGNESIUM: CPT

## 2020-10-07 PROCEDURE — 84478 ASSAY OF TRIGLYCERIDES: CPT

## 2020-10-07 PROCEDURE — 94664 DEMO&/EVAL PT USE INHALER: CPT

## 2020-10-07 PROCEDURE — 63600175 PHARM REV CODE 636 W HCPCS: Performed by: STUDENT IN AN ORGANIZED HEALTH CARE EDUCATION/TRAINING PROGRAM

## 2020-10-07 PROCEDURE — 97535 SELF CARE MNGMENT TRAINING: CPT

## 2020-10-07 PROCEDURE — 97116 GAIT TRAINING THERAPY: CPT | Mod: CQ

## 2020-10-07 PROCEDURE — A4216 STERILE WATER/SALINE, 10 ML: HCPCS | Performed by: SURGERY

## 2020-10-07 PROCEDURE — 27000221 HC OXYGEN, UP TO 24 HOURS

## 2020-10-07 PROCEDURE — A4217 STERILE WATER/SALINE, 500 ML: HCPCS | Performed by: STUDENT IN AN ORGANIZED HEALTH CARE EDUCATION/TRAINING PROGRAM

## 2020-10-07 PROCEDURE — 97803 MED NUTRITION INDIV SUBSEQ: CPT

## 2020-10-07 PROCEDURE — 20600001 HC STEP DOWN PRIVATE ROOM

## 2020-10-07 PROCEDURE — 94799 UNLISTED PULMONARY SVC/PX: CPT

## 2020-10-07 PROCEDURE — 25000003 PHARM REV CODE 250: Performed by: SURGERY

## 2020-10-07 PROCEDURE — 99900035 HC TECH TIME PER 15 MIN (STAT)

## 2020-10-07 PROCEDURE — 80197 ASSAY OF TACROLIMUS: CPT

## 2020-10-07 PROCEDURE — 97530 THERAPEUTIC ACTIVITIES: CPT

## 2020-10-07 PROCEDURE — C9113 INJ PANTOPRAZOLE SODIUM, VIA: HCPCS | Performed by: STUDENT IN AN ORGANIZED HEALTH CARE EDUCATION/TRAINING PROGRAM

## 2020-10-07 RX ADMIN — Medication 10 ML: at 12:10

## 2020-10-07 RX ADMIN — OXYCODONE HYDROCHLORIDE 5 MG: 5 TABLET ORAL at 04:10

## 2020-10-07 RX ADMIN — HEPARIN SODIUM 5000 UNITS: 5000 INJECTION INTRAVENOUS; SUBCUTANEOUS at 05:10

## 2020-10-07 RX ADMIN — OXYCODONE HYDROCHLORIDE 5 MG: 5 TABLET ORAL at 12:10

## 2020-10-07 RX ADMIN — MEROPENEM AND SODIUM CHLORIDE 1 G: 1 INJECTION, SOLUTION INTRAVENOUS at 10:10

## 2020-10-07 RX ADMIN — MUPIROCIN: 20 OINTMENT TOPICAL at 09:10

## 2020-10-07 RX ADMIN — TACROLIMUS 0.5 MG: 1 CAPSULE, GELATIN COATED ORAL at 09:10

## 2020-10-07 RX ADMIN — Medication 10 ML: at 06:10

## 2020-10-07 RX ADMIN — MEROPENEM AND SODIUM CHLORIDE 1 G: 1 INJECTION, SOLUTION INTRAVENOUS at 07:10

## 2020-10-07 RX ADMIN — TACROLIMUS 0.3 MG: 1 CAPSULE, GELATIN COATED ORAL at 05:10

## 2020-10-07 RX ADMIN — OXYCODONE HYDROCHLORIDE 5 MG: 5 TABLET ORAL at 03:10

## 2020-10-07 RX ADMIN — MEROPENEM AND SODIUM CHLORIDE 1 G: 1 INJECTION, SOLUTION INTRAVENOUS at 03:10

## 2020-10-07 RX ADMIN — HEPARIN SODIUM 5000 UNITS: 5000 INJECTION INTRAVENOUS; SUBCUTANEOUS at 02:10

## 2020-10-07 RX ADMIN — OXYCODONE HYDROCHLORIDE 5 MG: 5 TABLET ORAL at 06:10

## 2020-10-07 RX ADMIN — MAGNESIUM SULFATE HEPTAHYDRATE: 500 INJECTION, SOLUTION INTRAMUSCULAR; INTRAVENOUS at 09:10

## 2020-10-07 RX ADMIN — LEVOTHYROXINE SODIUM ANHYDROUS 40 MCG: 100 INJECTION, POWDER, LYOPHILIZED, FOR SOLUTION INTRAVENOUS at 09:10

## 2020-10-07 RX ADMIN — FLUCONAZOLE 400 MG: 2 INJECTION, SOLUTION INTRAVENOUS at 05:10

## 2020-10-07 RX ADMIN — OXYCODONE HYDROCHLORIDE 5 MG: 5 TABLET ORAL at 11:10

## 2020-10-07 RX ADMIN — HEPARIN SODIUM 5000 UNITS: 5000 INJECTION INTRAVENOUS; SUBCUTANEOUS at 09:10

## 2020-10-07 NOTE — PLAN OF CARE
Problem: Occupational Therapy Goal  Goal: Occupational Therapy Goal  Description: Goals to be met by: 10/19/20     Patient will increase functional independence with ADLs by performing:    UE Dressing with min A  LE Dressing with min A  Grooming while sitting EOB with min A-MET 10/5  Toileting from BSC  with min A for hygiene and clothing management.   Toilet transfer to BS with min A  Pt will tolerate sitting EOB with VSS with CGA while engaging in functional reaching tasks     Outcome: Ongoing, Progressing     Goals reviewed and remain appropriate, continue POC    PAULINE Rob  10/7/2020   Pager #: [phone]

## 2020-10-07 NOTE — SUBJECTIVE & OBJECTIVE
Interval History: No acute events overnight. G tube study with no evidence of contrast extravasation. Advanced diet to clears. Tolerating with no N/V. No increased drainage from drains.    Medications:  Continuous Infusions:   TPN ADULT CENTRAL LINE CUSTOM       Scheduled Meds:   bisacodyL  10 mg Rectal Daily    fluconazole (DIFLUCAN) IVPB  400 mg Intravenous Q24H    heparin (porcine)  5,000 Units Subcutaneous Q8H    levothyroxine  40 mcg Intravenous Daily    lipid (SMOFLIPID)  250 mL Intravenous Daily    meropenem (MERREM) IVPB  1 g Intravenous Q8H    mupirocin   Nasal BID    pantoprazole  40 mg Intravenous Daily    sodium chloride 0.9%  10 mL Intravenous Q6H    tacrolimus  0.5 mg Oral Daily AM    tacrolimus  0.3 mg Oral Daily PM     PRN Meds:acetaminophen, albuterol-ipratropium, dextrose 50%, dextrose 50%, enalaprilat, glucagon (human recombinant), insulin aspart U-100, iohexol, ondansetron, oxyCODONE, promethazine, sodium chloride 0.9%, Flushing PICC Protocol **AND** sodium chloride 0.9% **AND** sodium chloride 0.9%     Review of patient's allergies indicates:   Allergen Reactions    Codeine Itching     Other reaction(s): Itching    Lipitor [atorvastatin] Other (See Comments)     Other reaction(s): Muscle pain  Muscle cranmps    Morphine Itching     Other reaction(s): nausea and vomiting     Zoloft [sertraline] Other (See Comments)     Tremors/muscle spasms     Objective:     Vital Signs (Most Recent):  Temp: 97 °F (36.1 °C) (10/07/20 0800)  Pulse: 107 (10/07/20 0800)  Resp: (!) 22 (10/07/20 0800)  BP: (!) 165/69 (10/07/20 0800)  SpO2: 95 % (10/07/20 0901) Vital Signs (24h Range):  Temp:  [97 °F (36.1 °C)-100.3 °F (37.9 °C)] 97 °F (36.1 °C)  Pulse:  [] 107  Resp:  [18-24] 22  SpO2:  [92 %-98 %] 95 %  BP: (131-171)/(61-84) 165/69     Weight: 67.8 kg (149 lb 7.6 oz)  Body mass index is 30.19 kg/m².    Intake/Output - Last 3 Shifts       10/05 0700 - 10/06 0659 10/06 0700 - 10/07 0659 10/07  0700 - 10/08 0659    P.O. 0 30     Blood       NG/GT 0      IV Piggyback 600 350     TPN 1211 923     Total Intake(mL/kg) 1811 (26.7) 1303 (19.2)     Urine (mL/kg/hr) 850 (0.5) 1500 (0.9)     Emesis/NG output 0 0     Drains 137 457.5     Other 0 0     Stool 0 0     Blood       Total Output 987 1957.5     Net +824 -654.5            Urine Occurrence 7 x 3 x     Stool Occurrence 0 x 1 x     Emesis Occurrence 0 x 0 x           Physical Exam  Vitals signs and nursing note reviewed.   Constitutional:       Appearance: She is ill-appearing.   HENT:      Head: Normocephalic and atraumatic.      Mouth/Throat:      Mouth: Mucous membranes are dry.   Eyes:      General: No scleral icterus.  Cardiovascular:      Rate and Rhythm: Normal rate and regular rhythm.      Heart sounds: Normal heart sounds.   Abdominal:      General: There is distension.      Palpations: Abdomen is soft.      Comments: Midline incision with wound vac in place; excellent seal to wound vac.   L abd drains in place with mild serous output; G tube to gravity.   Skin:     General: Skin is warm and dry.      Coloration: Skin is not jaundiced.   Neurological:      Mental Status: She is alert.      Cranial Nerves: No cranial nerve deficit.      Sensory: No sensory deficit.      Motor: No weakness.         Significant Labs:  CBC:   Recent Labs   Lab 10/07/20  0437   WBC 12.19   RBC 2.64*   HGB 7.8*   HCT 25.0*      MCV 95   MCH 29.5   MCHC 31.2*     CMP:   Recent Labs   Lab 10/07/20  0437   *   CALCIUM 8.4*   ALBUMIN 1.7*   PROT 6.7      K 4.9   CO2 25      BUN 27*   CREATININE 0.6   ALKPHOS 594*   ALT 47*   AST 81*   BILITOT 4.2*       Significant Diagnostics:  I have reviewed all pertinent imaging results/findings within the past 24 hours.

## 2020-10-07 NOTE — PT/OT/SLP PROGRESS
Physical Therapy Treatment   - cotx with OT d/t pts tolerance/assistance needed    Patient Name:  Elda Hernandez   MRN:  [id number]    Recommendations:     Discharge Recommendations:  nursing facility, skilled   Discharge Equipment Recommendations: bedside commode, walker, rolling, bath bench   Barriers to discharge: decreased functional mobility    Assessment:     Elda Hernandez is a 50 y.o. female admitted with a medical diagnosis of Perforated abdominal viscus.  She presents with the following impairments/functional limitations:  weakness, impaired endurance, impaired functional mobilty, gait instability, impaired balance, impaired self care skills, decreased lower extremity function, impaired cardiopulmonary response to activity, pain. Pt tolerates session well with focus on bed mobility, transfers, and gait training. Pt progressing slowly and limited by pts fear of mobility and pt c/o anxiousness d/t pain when upright. Pt educated on benefits of mobilizing for pain and recovery. Pt educated on methods to reduce pain and importance of pre-medicating for therapy. Pt tolerates standing from EOB and transfer to bedside chair this day with assist of 2 persons. Pt will continue to benefit from therapy services to address impairments listed above.     Rehab Prognosis: Good; patient would benefit from acute skilled PT services to address these deficits and reach maximum level of function.    Recent Surgery: Procedure(s) (LRB):  LAPAROTOMY, EXPLORATORY, DRAINAGE OF ABSCESS, REPAIR OF GASTRIC PERFORATION, GASTROSTOMY TUBE PLACEMENT (N/A)  APPLICATION, WOUND VAC (N/A) 19 Days Post-Op    Plan:     During this hospitalization, patient to be seen 4 x/week to address the identified rehab impairments via gait training, therapeutic activities, therapeutic exercises, neuromuscular re-education and progress toward the following goals:    · Plan of Care Expires:  11/01/20    Subjective     Chief Complaint:  pain  Pain/Comfort:  · unrated c/o abdominal pain; nursing notified and enters to clamp pts G-tube and to administer pain medicine.       Objective:     Communicated with NSG prior to session.  Patient found HOB elevated with telemetry, wound vac, PICC line, PureWick, KERRY drain, oxygen, pulse ox (continuous) upon PTA entry to room.     General Precautions: Standard, fall   Orthopedic Precautions:N/A   Braces: N/A     Functional Mobility:  · Bed Mobility:     · Supine to Sit: moderate assistance  · Transfers:     · Sit to Stand:  moderate assistance and of 2 persons with B hand-held assist  · Bed to Chair: moderate assistance and of 2 persons with B hand-held assist  using  Stand Pivot  · Gait: Pt limited to steps with transfer with pt limited by poor upright posture once pt begins to take steps towards bedside chair.       AM-PAC 6 CLICK MOBILITY  Turning over in bed (including adjusting bedclothes, sheets and blankets)?: 3  Sitting down on and standing up from a chair with arms (e.g., wheelchair, bedside commode, etc.): 3  Moving from lying on back to sitting on the side of the bed?: 2  Moving to and from a bed to a chair (including a wheelchair)?: 2  Need to walk in hospital room?: 2  Climbing 3-5 steps with a railing?: 1  Basic Mobility Total Score: 13       Therapeutic Activities and Exercises:  Pt assisted with functional mobility as noted above.   Pt requires 2 attempts with sit<>stand d/t pt hesitancy to mobilize. Pt reassured and encouraged with pt more comfortable following thorough step-by-step breakdown of transfer process.   Pt educated on assistance needed and expectations for pt to mobilize with NSG staff for increased time UIC/OOB.     Patient left up in chair with all lines intact, call button in reach and NSG and PCT notified.  Pt assist of 2 persons for stand pivot to bedside chair or commode with NSG staff.     GOALS:   Multidisciplinary Problems     Physical Therapy Goals        Problem: Physical  Therapy Goal    Goal Priority Disciplines Outcome Goal Variances Interventions   Physical Therapy Goal     PT, PT/OT Ongoing, Progressing     Description: Goals to be met by: 10/15/2020     Patient will increase functional independence with mobility by performin. Supine to sit with Moderate Assistance - MET 10/5/2020  2. Rolling to Left and Right with Moderate Assistance. - MET 10/5/2020  3. Sit to stand transfer with Moderate Assistance - MET 10/5/2020  4. Sitting at edge of bed x10 minutes with Stand-by Assistance - MET 10/5/2020    REVISED:    1. Supine to sit with Set-up [place]  2. Sit to supine with Set-up [place]  3. Sit to stand transfer with Supervision  4. Bed to chair transfer with Minimal Assistance using Rolling Walker  5. Gait  x 50 feet with Minimal Assistance using Rolling Walker.                      Time Tracking:     PT Received On: 10/07/20  PT Start Time: 1127     PT Stop Time: 1159  PT Total Time (min): 32 min     Billable Minutes: Gait Training 8 and Therapeutic Activity 16    Treatment Type: Treatment  PT/PTA: PTA     PTA Visit Number: 1     Linus Arevalo, BEBETO  10/07/2020

## 2020-10-07 NOTE — TREATMENT PLAN
Hepatology Treatment Plan    Elda Hernandez is a 50 y.o. female admitted to hospital 9/3/2020 (Hospital Day: 35) due to Perforated abdominal viscus. Hepatology following for immunosuppression management.    Lab Results   Component Value Date    TACROLIMUS 3.2 (L) 10/07/2020    TACROLIMUS 4.1 (L) 10/06/2020    TACROLIMUS 2.7 (L) 10/05/2020       Lab Results   Component Value Date    CREATININE 0.6 10/07/2020    CREATININE 0.6 10/06/2020    CREATININE 0.6 10/05/2020       Lab Results   Component Value Date    ALT 47 (H) 10/07/2020    AST 81 (H) 10/07/2020     (H) 04/07/2018    ALKPHOS 594 (H) 10/07/2020    BILITOT 4.2 (H) 10/07/2020    WBC 12.19 10/07/2020    HGB 7.8 (L) 10/07/2020     10/07/2020       Kidney function is stable  Liver enzymes are stable    Plan  - Continue immunosuppression regimen without changes    Immunosuppression Regimen:  Tacrolimus: 0.5mg qAM, 0.3mg qPM    Please notify hepatology on day of discharge to discuss discharge medications and follow up.     Please obtain daily CBC, BMP, LFT, INR, immunosuppressant trough level    Thank you for involving us in the care of Elda Hernandez. Please call with any additional concerns or questions.    Fatuma Mathews MD  Gastroenterology Fellow

## 2020-10-07 NOTE — PROGRESS NOTES
Notified Dr. Hidalgo about pt RR at 22-25. Chest appeared to be swollen. Using accessory muscle to breathe. SpO2 at 94-95% on 4L of O2 via NC (baseline). Denied SOB. MD ordeered chest Xray . Also notified MD about pt having pitting edema on both feet. Both feet are elevated with boot.     BP and HR stable. Lat BP was 142/714 with HR at 104.

## 2020-10-07 NOTE — PROGRESS NOTES
"[facility]  Adult Nutrition  Progress Note    SUMMARY       Recommendations    Pt meets acute moderate malnutrition criteria, improving.     1. Continue current TPN + SMOF lipids - meeting needs.     2. As medically able, ADAT to low fiber with texture per SLP.     3. RD following.     Goals: continue to meet >75% EEN/EPN by RD follow-up  Nutrition Goal Status: new  Communication of RD Recs: (POC)    Reason for Assessment    Reason For Assessment: RD follow-up  Diagnosis: (gastric outlet obstruction)  Relevant Medical History: Esophageal stricture; HTN; Liver fibrosis; Seizures; SCC; HTN; S/p liver tx  Interdisciplinary Rounds: did not attend  General Information Comments: Pt on clear liquids, TPN + SMOF lipids infusing. Pt resting in bed with no family members at bedside. Pt reports okay appetite, consuming <50% of meals. Denies N/V/D/C. NFPE completed 9/6, pt with mild muscle/fat wasting. Pt meets acute moderate malnutrition criteria.   Nutrition Discharge Planning: unable to determine    Nutrition Risk Screen    Nutrition Risk Screen: tube feeding or parenteral nutrition    Nutrition/Diet History    Spiritual, Cultural Beliefs, Nondenominational Practices, Values that Affect Care: no  Food Allergies: NKFA  Factors Affecting Nutritional Intake: altered gastrointestinal function, clear liquid diet    Anthropometrics    Temp: 98.8 °F (37.1 °C)  Height Method: Stated  Height: 4' 11" (149.9 cm)  Height (inches): 59 in  Weight Method: Bed Scale  Weight: 67.8 kg (149 lb 7.6 oz)  Weight (lb): 149.47 lb  Ideal Body Weight (IBW), Female: 95 lb  % Ideal Body Weight, Female (lb): 156.84 %  BMI (Calculated): 30.2  BMI Grade: 25 - 29.9 - overweight     Lab/Procedures/Meds    Pertinent Labs Reviewed: reviewed  Pertinent Labs Comments: Cr 27, glucose 180, alk phos 594, T bili 4.2, AST 81, ALT 47, triglycerides 409  Pertinent Medications Reviewed: reviewed  Pertinent Medications Comments: noted    Estimated/Assessed " Needs    Weight Used For Calorie Calculations: 58 kg (127 lb 13.9 oz)  Energy Calorie Requirements (kcal): 1400  Energy Need Method: [place]-St Jeor(x1.3PAL)  Protein Requirements: 67-84 gm (1.2-1.5gm/kg)  Weight Used For Protein Calculations: 56 kg (123 lb 7.3 oz)(UBW)  Fluid Requirements (mL): 1 mL/kcal or per MD     RDA Method (mL): 1400     Nutrition Prescription Ordered    Current Diet Order: Clear Liquids  Current Nutrition Support Formula Ordered: (Custom TPN 85 gm AA / 165 gm Dex + IV lipids)  Current Nutrition Support Rate Ordered: 60 (ml)  Current Nutrition Support Frequency Ordered: mL/hr    Evaluation of Received Nutrient/Fluid Intake    Parenteral Calories (kcal): 901  Parenteral Protein (gm): 85  Parenteral Fluid (mL): 1440  Lipid Calories (kcals): 500 kcals  GIR (Glucose Infusion Rate) (mg/kg/min): 2.04 mg/kg/min  Total Calories (kcal): 1401  % Kcal Needs: 100  % Protein Needs: 115  Energy Calories Required: meeting needs  Protein Required: exceeds needs  Fluid Required: (per MD)  Comments: LBM 10/6  Tolerance: tolerating  % Intake of Estimated Energy Needs: 75 - 100 %  % Meal Intake: 25 - 50 %    Nutrition Risk    Level of Risk/Frequency of Follow-up: (f/u 1 x wk)     Assessment and Plan    Moderate Protein-Calorie Malnutrition  Malnutrition in the context of Acute Illness/Injury     Related to (etiology):  Decreased intake      Signs and Symptoms (as evidenced by):  Energy Intake: <50% of estimated energy requirement for >3 weeks   Muscle Mass Depletion: mild depletion of temples   Weight Loss: 11% x 3 weeks per family      Interventions (treatment strategy):  Collaboration of care with other providers  Parenteral nutrition      Nutrition Diagnosis Status:  Continues, Improving      Monitor and Evaluation    Food and Nutrient Intake: energy intake, parenteral nutrition intake  Food and Nutrient Adminstration: enteral and parenteral nutrition administration, diet order  Knowledge/Beliefs/Attitudes:  food and nutrition knowledge/skill  Anthropometric Measurements: weight, weight change  Biochemical Data, Medical Tests and Procedures: electrolyte and renal panel, gastrointestinal profile, glucose/endocrine profile, inflammatory profile, lipid profile  Nutrition-Focused Physical Findings: overall appearance     Malnutrition Assessment  Malnutrition Type: acute illness or injury          Weight Loss (Malnutrition): (11% x 3 weeks)  Energy Intake (Malnutrition): less than or equal to 50% for greater than or equal to 1 month   Orbital Region (Subcutaneous Fat Loss): mild depletion  Upper Arm Region (Subcutaneous Fat Loss): well nourished  Thoracic and Lumbar Region: well nourished   [place] Region (Muscle Loss): moderate depletion  Clavicle Bone Region (Muscle Loss): mild depletion  Clavicle and Acromion Bone Region (Muscle Loss): well nourished  Patellar Region (Muscle Loss): well nourished  Anterior Thigh Region (Muscle Loss): well nourished  Posterior Calf Region (Muscle Loss): well nourished   Edema (Fluid Accumulation): 2-->mild(lower extremities)             Nutrition Follow-Up    RD Follow-up?: Yes

## 2020-10-07 NOTE — PROGRESS NOTES
[facility]  General Surgery  Progress Note    Subjective:     History of Present Illness:  Ms. Hernandez is a 51yo F w/PMH of von Gierke disease s/p liver transplant (2002, on tacro + MMF, follows Dr. Nielsen), hx chronic rejection (bx 2015 with acute and chronic rejection s/p steroids), biliary stricture and ductopenic rejection, recently with cirrhosis on fibroscan (10/2019), HTN, and depression who presents as a transfer for further evaluation of gastric outlet obstruction and esophageal stricturing.  Patient decompensated requiring multiple pressors and intubation. CT scan showed free air. Prior to intubation patient reported severe abdominal pain.   states that patient has had epigastric pain, nausea, and vomiting for several days.     Post-Op Info:  Procedure(s) (LRB):  LAPAROTOMY, EXPLORATORY, DRAINAGE OF ABSCESS, REPAIR OF GASTRIC PERFORATION, GASTROSTOMY TUBE PLACEMENT (N/A)  APPLICATION, WOUND VAC (N/A)   19 Days Post-Op     Interval History: No acute events overnight. G tube study with no evidence of contrast extravasation. Advanced diet to clears. Tolerating with no N/V. No increased drainage from drains.    Medications:  Continuous Infusions:   TPN ADULT CENTRAL LINE CUSTOM       Scheduled Meds:   bisacodyL  10 mg Rectal Daily    fluconazole (DIFLUCAN) IVPB  400 mg Intravenous Q24H    heparin (porcine)  5,000 Units Subcutaneous Q8H    levothyroxine  40 mcg Intravenous Daily    lipid (SMOFLIPID)  250 mL Intravenous Daily    meropenem (MERREM) IVPB  1 g Intravenous Q8H    mupirocin   Nasal BID    pantoprazole  40 mg Intravenous Daily    sodium chloride 0.9%  10 mL Intravenous Q6H    tacrolimus  0.5 mg Oral Daily AM    tacrolimus  0.3 mg Oral Daily PM     PRN Meds:acetaminophen, albuterol-ipratropium, dextrose 50%, dextrose 50%, enalaprilat, glucagon (human recombinant), insulin aspart U-100, iohexol, ondansetron, oxyCODONE, promethazine, sodium chloride 0.9%, Flushing  PICC Protocol **AND** sodium chloride 0.9% **AND** sodium chloride 0.9%     Review of patient's allergies indicates:   Allergen Reactions    Codeine Itching     Other reaction(s): Itching    Lipitor [atorvastatin] Other (See Comments)     Other reaction(s): Muscle pain  Muscle cranmps    Morphine Itching     Other reaction(s): nausea and vomiting     Zoloft [sertraline] Other (See Comments)     Tremors/muscle spasms     Objective:     Vital Signs (Most Recent):  Temp: 97 °F (36.1 °C) (10/07/20 0800)  Pulse: 107 (10/07/20 0800)  Resp: (!) 22 (10/07/20 0800)  BP: (!) 165/69 (10/07/20 0800)  SpO2: 95 % (10/07/20 0901) Vital Signs (24h Range):  Temp:  [97 °F (36.1 °C)-100.3 °F (37.9 °C)] 97 °F (36.1 °C)  Pulse:  [] 107  Resp:  [18-24] 22  SpO2:  [92 %-98 %] 95 %  BP: (131-171)/(61-84) 165/69     Weight: 67.8 kg (149 lb 7.6 oz)  Body mass index is 30.19 kg/m².    Intake/Output - Last 3 Shifts       10/05 0700 - 10/06 0659 10/06 0700 - 10/07 0659 10/07 0700 - 10/08 0659    P.O. 0 30     Blood       NG/GT 0      IV Piggyback 600 350     TPN 1211 923     Total Intake(mL/kg) 1811 (26.7) 1303 (19.2)     Urine (mL/kg/hr) 850 (0.5) 1500 (0.9)     Emesis/NG output 0 0     Drains 137 457.5     Other 0 0     Stool 0 0     Blood       Total Output 987 1957.5     Net +824 -654.5            Urine Occurrence 7 x 3 x     Stool Occurrence 0 x 1 x     Emesis Occurrence 0 x 0 x           Physical Exam  Vitals signs and nursing note reviewed.   Constitutional:       Appearance: She is ill-appearing.   HENT:      Head: Normocephalic and atraumatic.      Mouth/Throat:      Mouth: Mucous membranes are dry.   Eyes:      General: No scleral icterus.  Cardiovascular:      Rate and Rhythm: Normal rate and regular rhythm.      Heart sounds: Normal heart sounds.   Abdominal:      General: There is distension.      Palpations: Abdomen is soft.      Comments: Midline incision with wound vac in place; excellent seal to wound vac.   L abd  drains in place with mild serous output; G tube to gravity.   Skin:     General: Skin is warm and dry.      Coloration: Skin is not jaundiced.   Neurological:      Mental Status: She is alert.      Cranial Nerves: No cranial nerve deficit.      Sensory: No sensory deficit.      Motor: No weakness.         Significant Labs:  CBC:   Recent Labs   Lab 10/07/20  0437   WBC 12.19   RBC 2.64*   HGB 7.8*   HCT 25.0*      MCV 95   MCH 29.5   MCHC 31.2*     CMP:   Recent Labs   Lab 10/07/20  0437   *   CALCIUM 8.4*   ALBUMIN 1.7*   PROT 6.7      K 4.9   CO2 25      BUN 27*   CREATININE 0.6   ALKPHOS 594*   ALT 47*   AST 81*   BILITOT 4.2*       Significant Diagnostics:  I have reviewed all pertinent imaging results/findings within the past 24 hours.    Assessment/Plan:     * Perforated abdominal viscus  50 y.o. female w/ free air on CT scan. S/p emergent ex lap on 9/4 w/ findings of gastric perforation, primary repair. Now w/  abdominal closure on 9/6, skin stapled closed, KERRY drains removed. Class A to OR on 9/18 for free air and extrav or oral contrast seen on CT. CT on 9/25 redemonstrated contrast leaking from stomach, but well-drained with surgical drain.    CLD/TPN  G tube clamped  Wound vac changes w/ wound care  PT/OT    Dispo: Discharge planning, recommending SNF         Rocio Hidalgo MD  General Surgery  [facility]

## 2020-10-07 NOTE — PLAN OF CARE
10/07/20 1043   Post-Acute Status   Post-Acute Authorization Placement   Post-Acute Placement Status Pending Payor Review   Pt is currently pending auth for admit to [facility].      Chuyita Monterroso LMSW  [facility]  [id number]

## 2020-10-07 NOTE — PT/OT/SLP PROGRESS
Occupational Therapy   Treatment    Name: Elda Hernandez  MRN: [id number]  Admitting Diagnosis:  Perforated abdominal viscus  19 Days Post-Op    Recommendations:     Discharge Recommendations: nursing facility, skilled  Discharge Equipment Recommendations:  bedside commode, walker, rolling, bath bench  Barriers to discharge:  None    Assessment:     Elda Hernandez is a 50 y.o. female with a medical diagnosis of Perforated abdominal viscus.  She presents with anxiousness regarding mobility and pain. Pt premedicated perior to mobility. Pt completed bed mobility with mod A and bed>bedside chair transfer with mod A x2.  Performance deficits affecting function are weakness, impaired endurance, impaired functional mobilty, impaired self care skills, gait instability, impaired balance, decreased lower extremity function, impaired cardiopulmonary response to activity. Pt would benefit from skilled OT services in order to maximize independence with ADLs and facilitate safe discharge. Pt would benefit from SNF upon discharge to return to PLOF and decrease burden of care. Pt is safe to transfer bed <> BSC/bedside chair x2 assist.    Rehab Prognosis:  Good; patient would benefit from acute skilled OT services to address these deficits and reach maximum level of function.       Plan:     Patient to be seen 4 x/week to address the above listed problems via self-care/home management, therapeutic activities, therapeutic exercises  · Plan of Care Expires: 10/15/20  · Plan of Care Reviewed with: patient    Subjective     Pain/Comfort:  · Pain Rating 1: 0/10  · Pain Addressed 1: Pre-medicate for activity, Reposition, Distraction  · Pain Rating Post-Intervention 1: 0/10    Objective:     Communicated with: RN and PTA prior to session.  Patient found HOB elevated with telemetry, wound vac, PICC line, KERRY drain, oxygen, pulse ox (continuous) upon OT entry to room.    General Precautions: Standard, fall   Orthopedic Precautions:N/A    Braces: N/A     Occupational Performance:     Bed Mobility:    · Patient completed Supine to Sit with moderate assistance     Functional Mobility/Transfers:  · Patient completed Sit <> Stand Transfer with moderate assistance and of 2 persons  with  hand-held assist   · Patient completed Bed <> Chair Transfer using Stand Pivot technique with moderate assistance and of 2 persons with hand-held assist   · Pt with poor upright posture    Activities of Daily Living:  · Lower Body Dressing: maximal assistance to don B socks in supine  · Toileting: dependence purewick in place supine upon OT arrival; upon transfer to [facility], pt immediately asked for purewick to void. OT placed purewick and pt voided      AMPAC 6 Click ADL: 13    Treatment & Education:  -Therapist provided facilitation and instruction of proper body mechanics, energy conservation, and fall prevention strategies during tasks listed above.  -Discussion of discharge recommendations  -Pt required frequent reassurance and step-by-step breakdown of tasks   -Pt educated on role of OT, POC and goals for therapy  -Pt educated on importance of OOB activities with staff member assistance and sitting OOB majority of the day.   -Pt verbalized understanding. Pt expressed no further concerns/questions  -Whiteboard updated     Patient left up in chair with all lines intact, call button in reach and RN and PCT notifiedEducation:      GOALS:   Multidisciplinary Problems     Occupational Therapy Goals        Problem: Occupational Therapy Goal    Goal Priority Disciplines Outcome Interventions   Occupational Therapy Goal     OT, PT/OT Ongoing, Progressing    Description: Goals to be met by: 10/19/20     Patient will increase functional independence with ADLs by performing:    UE Dressing with min A  LE Dressing with min A  Grooming while sitting EOB with min A-MET 10/5  Toileting from [facility]  with min A for hygiene and clothing management.   Toilet transfer to [facility] with min A  Pt  will tolerate sitting EOB with VSS with CGA while engaging in functional reaching tasks                      Time Tracking:     OT Date of Treatment: 10/07/20  OT Start Time: 1127  OT Stop Time: 1200  OT Total Time (min): 33 min    Billable Minutes:Self Care/Home Management 16  Therapeutic Activity 8    Stephanie Bautista OT  10/7/2020

## 2020-10-07 NOTE — ASSESSMENT & PLAN NOTE
50 y.o. female w/ free air on CT scan. S/p emergent ex lap on 9/4 w/ findings of gastric perforation, primary repair. Now w/  abdominal closure on 9/6, skin stapled closed, KERRY drains removed. Class A to OR on 9/18 for free air and extrav or oral contrast seen on CT. CT on 9/25 redemonstrated contrast leaking from stomach, but well-drained with surgical drain.    CLD/TPN  G tube clamped  Wound vac changes w/ wound care  PT/OT    Dispo: Discharge planning, recommending SNF

## 2020-10-07 NOTE — CARE UPDATE
"RAPID RESPONSE NURSE PROACTIVE ROUNDING NOTE     Time of Visit:     Admit Date: 9/3/2020  LOS: 34  Code Status: Full Code   Date of Visit: 10/07/2020  : 1970  Age: 50 y.o.  Sex: female  Race: White  Bed: [address] [place] [address] A:   MRN: [id number]  Was the patient discharged from an ICU this admission? yes   Was the patient discharged from a PACU within last 24 hours?  no  Did the patient receive conscious sedation/general anesthesia in last 24 hours?  no  Was the patient in the ED within the past 24 hours?  no  Was the patient started on NIPPV within the past 24 hours?  no  Attending Physician: Clark Rodriguez MD  Primary Service: Networked reference to record PCT     ASSESSMENT     Notified by bedside RN via phone call.  Reason for alert: concern for aspiration    Diagnosis: Perforated abdominal viscus    Abnormal Vital Signs: BP (!) 144/68 (BP Location: Left arm, Patient Position: Lying)   Pulse 106   Temp 98.9 °F (37.2 °C) (Oral)   Resp 18   Ht 4' 11" (1.499 m)   Wt 67.8 kg (149 lb 7.6 oz)   SpO2 (!) 94%   BMI 30.19 kg/m²      Clinical Issues: Respiratory    Patient  has a past medical history of Angiolipoma of kidney, Arnold-Chiari malformation, Depression, Esophageal stricture, Essential tremor, Hypertension, Left bundle branch block, Liver fibrosis, transplanted liver, Migraine without aura, MVP (mitral valve prolapse), Non-rheumatic mitral regurgitation, Non-rheumatic tricuspid valve insufficiency, Osteoporosis, Recurrent urinary tract infection, Seizures, Shingles, SIADH (syndrome of inappropriate ADH production), Squamous cell carcinoma, Tricuspid valve prolapse, Urolithiasis, and Von Gierke disease.      Called by BS RN to come eval pt. Pt passed bedside RN swallow study but after given PO liquids earlier in shift, pt became SOB w/ RR 20s. O2 sats mid 90s on 4L NC. O2 sats now 96-97% on O2 4L NC. Pt AAO. Pt denies SOB. VSS     INTERVENTIONS/ RECOMMENDATIONS     Continue to monitor VS and resp " status.     Discussed plan of care with RNAmber.    PHYSICIAN ESCALATION     Yes/No  no    Orders received and case discussed with NA.    Disposition: Remain in room 1022.    FOLLOW-UP     Call back the Rapid Response Nurse, HEMANTH DUNBAR RN at [phone] for additional questions or concerns.

## 2020-10-07 NOTE — PLAN OF CARE
POC reviewed w/ pt, verbalized understanding. Pt AAOx4. VSS on 4L NC. G tube to gravity. KERRY drains intact and patent. Midline with  Wound vac intact and patent.Pain managed with PRN pain meds. Pt voids per PW, with adequate UOP overnight. Pt tolerating clear liquid diet with no c/o nausea. Pt slept between care. Frequent rounds made for pt safety. Call light in reach and bed in lowest position. WCTM

## 2020-10-07 NOTE — PROGRESS NOTES
Patient passed dysphagia nsg screen. Had a few sips of water and sprite (approx 30 ml). However, pt had increased RR rate (24-25 bpm). Pt occasionally has episodes of SOB, however this one sustaining a little bit longer and concern since switch to clear liquid diet. Pt has complaints of phlegm since yesterday. Pt spo2 95% and >. Pt on 4L NC humidified oxygen.      Report given to night shift nurse. Notified Dr. Rosas of the above. Rapid team assessing as well.

## 2020-10-08 LAB
ALBUMIN SERPL BCP-MCNC: 1.5 G/DL (ref 3.5–5.2)
ALP SERPL-CCNC: 607 U/L (ref 55–135)
ALT SERPL W/O P-5'-P-CCNC: 55 U/L (ref 10–44)
ANION GAP SERPL CALC-SCNC: 7 MMOL/L (ref 8–16)
AST SERPL-CCNC: 92 U/L (ref 10–40)
BASOPHILS # BLD AUTO: 0.08 K/UL (ref 0–0.2)
BASOPHILS NFR BLD: 0.7 % (ref 0–1.9)
BILIRUB SERPL-MCNC: 3.8 MG/DL (ref 0.1–1)
BUN SERPL-MCNC: 29 MG/DL (ref 6–20)
CALCIUM SERPL-MCNC: 8.3 MG/DL (ref 8.7–10.5)
CHLORIDE SERPL-SCNC: 111 MMOL/L (ref 95–110)
CO2 SERPL-SCNC: 27 MMOL/L (ref 23–29)
CREAT SERPL-MCNC: 0.6 MG/DL (ref 0.5–1.4)
DIFFERENTIAL METHOD: ABNORMAL
EOSINOPHIL # BLD AUTO: 0.6 K/UL (ref 0–0.5)
EOSINOPHIL NFR BLD: 5.1 % (ref 0–8)
ERYTHROCYTE [DISTWIDTH] IN BLOOD BY AUTOMATED COUNT: 17.6 % (ref 11.5–14.5)
EST. GFR  (AFRICAN AMERICAN): >60 ML/MIN/1.73 M^2
EST. GFR  (NON AFRICAN AMERICAN): >60 ML/MIN/1.73 M^2
GLUCOSE SERPL-MCNC: 112 MG/DL (ref 70–110)
HCT VFR BLD AUTO: 23.7 % (ref 37–48.5)
HGB BLD-MCNC: 7.2 G/DL (ref 12–16)
IMM GRANULOCYTES # BLD AUTO: 0.09 K/UL (ref 0–0.04)
IMM GRANULOCYTES NFR BLD AUTO: 0.8 % (ref 0–0.5)
LYMPHOCYTES # BLD AUTO: 1.3 K/UL (ref 1–4.8)
LYMPHOCYTES NFR BLD: 12.2 % (ref 18–48)
MAGNESIUM SERPL-MCNC: 1.6 MG/DL (ref 1.6–2.6)
MCH RBC QN AUTO: 28.3 PG (ref 27–31)
MCHC RBC AUTO-ENTMCNC: 30.4 G/DL (ref 32–36)
MCV RBC AUTO: 93 FL (ref 82–98)
MONOCYTES # BLD AUTO: 1.4 K/UL (ref 0.3–1)
MONOCYTES NFR BLD: 12.7 % (ref 4–15)
NEUTROPHILS # BLD AUTO: 7.5 K/UL (ref 1.8–7.7)
NEUTROPHILS NFR BLD: 68.5 % (ref 38–73)
NRBC BLD-RTO: 0 /100 WBC
PHOSPHATE SERPL-MCNC: 3.3 MG/DL (ref 2.7–4.5)
PLATELET # BLD AUTO: 159 K/UL (ref 150–350)
PMV BLD AUTO: 13 FL (ref 9.2–12.9)
POCT GLUCOSE: 109 MG/DL (ref 70–110)
POCT GLUCOSE: 117 MG/DL (ref 70–110)
POCT GLUCOSE: 127 MG/DL (ref 70–110)
POTASSIUM SERPL-SCNC: 3.9 MMOL/L (ref 3.5–5.1)
PROT SERPL-MCNC: 5.9 G/DL (ref 6–8.4)
RBC # BLD AUTO: 2.54 M/UL (ref 4–5.4)
SODIUM SERPL-SCNC: 145 MMOL/L (ref 136–145)
TACROLIMUS BLD-MCNC: 6 NG/ML (ref 5–15)
WBC # BLD AUTO: 10.95 K/UL (ref 3.9–12.7)

## 2020-10-08 PROCEDURE — 97535 SELF CARE MNGMENT TRAINING: CPT

## 2020-10-08 PROCEDURE — 99900035 HC TECH TIME PER 15 MIN (STAT)

## 2020-10-08 PROCEDURE — 80197 ASSAY OF TACROLIMUS: CPT

## 2020-10-08 PROCEDURE — 27000221 HC OXYGEN, UP TO 24 HOURS

## 2020-10-08 PROCEDURE — 63600175 PHARM REV CODE 636 W HCPCS: Performed by: SURGERY

## 2020-10-08 PROCEDURE — 63600175 PHARM REV CODE 636 W HCPCS: Performed by: STUDENT IN AN ORGANIZED HEALTH CARE EDUCATION/TRAINING PROGRAM

## 2020-10-08 PROCEDURE — 85025 COMPLETE CBC W/AUTO DIFF WBC: CPT

## 2020-10-08 PROCEDURE — 25000003 PHARM REV CODE 250: Performed by: STUDENT IN AN ORGANIZED HEALTH CARE EDUCATION/TRAINING PROGRAM

## 2020-10-08 PROCEDURE — 97116 GAIT TRAINING THERAPY: CPT | Mod: CQ

## 2020-10-08 PROCEDURE — 97530 THERAPEUTIC ACTIVITIES: CPT

## 2020-10-08 PROCEDURE — 25000003 PHARM REV CODE 250: Performed by: SURGERY

## 2020-10-08 PROCEDURE — 20600001 HC STEP DOWN PRIVATE ROOM

## 2020-10-08 PROCEDURE — C9113 INJ PANTOPRAZOLE SODIUM, VIA: HCPCS | Performed by: STUDENT IN AN ORGANIZED HEALTH CARE EDUCATION/TRAINING PROGRAM

## 2020-10-08 PROCEDURE — A4216 STERILE WATER/SALINE, 10 ML: HCPCS | Performed by: SURGERY

## 2020-10-08 PROCEDURE — 97530 THERAPEUTIC ACTIVITIES: CPT | Mod: CQ

## 2020-10-08 PROCEDURE — 84100 ASSAY OF PHOSPHORUS: CPT

## 2020-10-08 PROCEDURE — 83735 ASSAY OF MAGNESIUM: CPT

## 2020-10-08 PROCEDURE — A4217 STERILE WATER/SALINE, 500 ML: HCPCS | Performed by: STUDENT IN AN ORGANIZED HEALTH CARE EDUCATION/TRAINING PROGRAM

## 2020-10-08 PROCEDURE — 80053 COMPREHEN METABOLIC PANEL: CPT

## 2020-10-08 PROCEDURE — 94761 N-INVAS EAR/PLS OXIMETRY MLT: CPT

## 2020-10-08 RX ORDER — OXYCODONE HYDROCHLORIDE 5 MG/1
5 TABLET ORAL ONCE
Status: COMPLETED | OUTPATIENT
Start: 2020-10-08 | End: 2020-10-08

## 2020-10-08 RX ADMIN — HEPARIN SODIUM 5000 UNITS: 5000 INJECTION INTRAVENOUS; SUBCUTANEOUS at 01:10

## 2020-10-08 RX ADMIN — MEROPENEM AND SODIUM CHLORIDE 1 G: 1 INJECTION, SOLUTION INTRAVENOUS at 11:10

## 2020-10-08 RX ADMIN — Medication 10 ML: at 11:10

## 2020-10-08 RX ADMIN — HEPARIN SODIUM 5000 UNITS: 5000 INJECTION INTRAVENOUS; SUBCUTANEOUS at 05:10

## 2020-10-08 RX ADMIN — MUPIROCIN: 20 OINTMENT TOPICAL at 10:10

## 2020-10-08 RX ADMIN — Medication 10 ML: at 06:10

## 2020-10-08 RX ADMIN — LEVOTHYROXINE SODIUM ANHYDROUS 40 MCG: 100 INJECTION, POWDER, LYOPHILIZED, FOR SOLUTION INTRAVENOUS at 10:10

## 2020-10-08 RX ADMIN — MAGNESIUM SULFATE HEPTAHYDRATE: 500 INJECTION, SOLUTION INTRAMUSCULAR; INTRAVENOUS at 10:10

## 2020-10-08 RX ADMIN — MEROPENEM AND SODIUM CHLORIDE 1 G: 1 INJECTION, SOLUTION INTRAVENOUS at 07:10

## 2020-10-08 RX ADMIN — OXYCODONE HYDROCHLORIDE 5 MG: 5 TABLET ORAL at 01:10

## 2020-10-08 RX ADMIN — OXYCODONE HYDROCHLORIDE 5 MG: 5 TABLET ORAL at 10:10

## 2020-10-08 RX ADMIN — Medication 10 ML: at 12:10

## 2020-10-08 RX ADMIN — FLUCONAZOLE 400 MG: 2 INJECTION, SOLUTION INTRAVENOUS at 04:10

## 2020-10-08 RX ADMIN — OXYCODONE HYDROCHLORIDE 5 MG: 5 TABLET ORAL at 06:10

## 2020-10-08 RX ADMIN — PANTOPRAZOLE SODIUM 40 MG: 40 INJECTION, POWDER, LYOPHILIZED, FOR SOLUTION INTRAVENOUS at 10:10

## 2020-10-08 RX ADMIN — MUPIROCIN: 20 OINTMENT TOPICAL at 09:10

## 2020-10-08 RX ADMIN — TACROLIMUS 0.5 MG: 1 CAPSULE, GELATIN COATED ORAL at 09:10

## 2020-10-08 RX ADMIN — TACROLIMUS 0.3 MG: 1 CAPSULE, GELATIN COATED ORAL at 06:10

## 2020-10-08 RX ADMIN — OXYCODONE 5 MG: 5 TABLET ORAL at 01:10

## 2020-10-08 RX ADMIN — MEROPENEM AND SODIUM CHLORIDE 1 G: 1 INJECTION, SOLUTION INTRAVENOUS at 03:10

## 2020-10-08 RX ADMIN — Medication 10 ML: at 05:10

## 2020-10-08 RX ADMIN — ONDANSETRON 4 MG: 2 INJECTION INTRAMUSCULAR; INTRAVENOUS at 07:10

## 2020-10-08 RX ADMIN — ENALAPRILAT 1.25 MG: 2.5 INJECTION INTRAVENOUS at 01:10

## 2020-10-08 RX ADMIN — HEPARIN SODIUM 5000 UNITS: 5000 INJECTION INTRAVENOUS; SUBCUTANEOUS at 10:10

## 2020-10-08 NOTE — PLAN OF CARE
Problem: Occupational Therapy Goal  Goal: Occupational Therapy Goal  Description: Goals to be met by: 10/19/20     Patient will increase functional independence with ADLs by performing:    UE Dressing with min A  LE Dressing with min A  Grooming while sitting EOB with min A-MET 10/5  Toileting from BS  with min A for hygiene and clothing management.  -MET 10/8  Toilet transfer to BS with min A  Pt will tolerate sitting EOB with VSS with CGA while engaging in functional reaching tasks     Outcome: Ongoing, Progressing     Goals reviewed and updated. Continue POC    PAULINE Rob  10/8/2020   Pager #: [phone]

## 2020-10-08 NOTE — PLAN OF CARE
Plan of care reviewed with pt: awake, alert, can answer all orientation correctly but sometimes talking a little bit confused (which is her baseline from yesterday). Still on 4L of O2 with SpO2 above 95%, RR 20 most of the time, sometimes tachypnea to 22-25 but denied SOB. IS and Acapella encouraged to use often, can only get to 500cc on the IS.  Midline incision with WV at cont -125mmHg with no output. 2 KERRY drains intact to bulb suction , each put out only 5cc of yellow creamy drainage. Wound pouch to LLQ has no output. G tube intact, unclamped to gravity bag all the time except after giving oral medicine, small output. Blood sugar in the 90s. TPN at 60. On clear liquid diet but consumed very small mount of water, Boost supplement ordered starting  tmr morning. Generallized edema on all over her body, has pitting edema 2+ on her legs and feet, boots applied. Purewick intact with adequate amount of dark yellow concentrated urine. No BM today. Reported that she had a BM yesterday, refused the suppository. Refused her schedule Protonic,  notified. Skin on back, sacrum and buttock intact, has some small skin tear on her LLE (healing) and a puncture wound her LLQ (covered with foam dressing). Requested pain medicine around the clock q4h, only rated her pain between 4-6.  Call light in reach. [facility]

## 2020-10-08 NOTE — PLAN OF CARE
Pt received from Amber at 0000, I agree with her assessment findings.     POC reviewed with patient, understanding acknowledged. Pt ANOx3, most VSS on 4L NC - hypertensive episode treated with PRN enalapril and her pressure resolved to the 140's/150's SBP.     Skin: She has 2 L abdomen KERRY drains, no output from them tonight and the dressings remain C/D/I. L abdomen G-tube remains to gravity, minimal green output. Small KERRY removal site is draining scant yellow serous fluid to a wound pouch. ML incision w/ staples is GABY and has a WV @ continuous suction 125 in place; seal intact, very little drainage to the cannister this shift.     TPN infusing through R PICC @ 60. ABX therapy continued per MAR through other lumen. Tolerating clear liquid diet well. No BM this shift. UOP adequate per nicko. Q6 POCT checks maintained, no coverage needed tonight. Telemetry and  in place, NSR/low NST and 02 93-96%. Q2 turns maintained. Pt able to make needs known and remains free of fall or injury as safety measures intact. Will continue to monitor.

## 2020-10-08 NOTE — PT/OT/SLP PROGRESS
"Physical Therapy Treatment    Patient Name:  Elda Hernandez   MRN:  [id number]    Recommendations:     Discharge Recommendations:  nursing facility, skilled   Discharge Equipment Recommendations: bedside commode, walker, rolling, bath bench   Barriers to discharge: decreased functional mobility    Assessment:     Elda Hernandez is a 50 y.o. female admitted with a medical diagnosis of Perforated abdominal viscus.  She presents with the following impairments/functional limitations:  weakness, impaired endurance, impaired self care skills, impaired functional mobilty, gait instability, impaired balance, decreased lower extremity function, impaired cardiopulmonary response to activity.    Rehab Prognosis: Good; patient would benefit from acute skilled PT services to address these deficits and reach maximum level of function.    Recent Surgery: Procedure(s) (LRB):  LAPAROTOMY, EXPLORATORY, DRAINAGE OF ABSCESS, REPAIR OF GASTRIC PERFORATION, GASTROSTOMY TUBE PLACEMENT (N/A)  APPLICATION, WOUND VAC (N/A) 20 Days Post-Op    Plan:     During this hospitalization, patient to be seen 4 x/week to address the identified rehab impairments via gait training, therapeutic activities, therapeutic exercises, neuromuscular re-education and progress toward the following goals:    · Plan of Care Expires:  11/01/20    Subjective     Chief Complaint: pain  Patient/Family Comments/goals: "I'd like to sit on the commode to pee if I can."  Pain/Comfort:  · Pain Rating 1: other (see comments)(unrated c/o pain)  · Location 1: abdomen      Objective:     Communicated with NSG prior to session.  Patient found HOB elevated with PICC line, KERRY drain, oxygen, pulse ox (continuous) upon PTA entry to room.     General Precautions: Standard, fall   Orthopedic Precautions:N/A   Braces: N/A     Functional Mobility:  · Bed Mobility:     · Scooting: contact guard assistance  · Supine to Sit: minimum assistance  · Transfers:     · Sit to Stand:  moderate " assistance and of 2 persons with hand-held assist  · Bed to Chair: moderate assistance and of 2 persons with  hand-held assist  using  Stand Pivot  · Gait: Pt able to take improved shuffled steps to transfer bed > commode > bedside chair. Pt takes steps with commode > bedside chair for ~3 ft with pt primarily dragging LEs to advance.       AM-PAC 6 CLICK MOBILITY  Turning over in bed (including adjusting bedclothes, sheets and blankets)?: 3  Sitting down on and standing up from a chair with arms (e.g., wheelchair, bedside commode, etc.): 3  Moving from lying on back to sitting on the side of the bed?: 3  Moving to and from a bed to a chair (including a wheelchair)?: 2  Need to walk in hospital room?: 2  Climbing 3-5 steps with a railing?: 1  Basic Mobility Total Score: 14       Therapeutic Activities and Exercises:  Pt assisted with functional mobility as noted above.   Sit<>stand x 3 trials with B HHA and B knee blocking. Pt continues to require assistance to elevate and assistance for improved upright stance. Pt with tendency for flexed forward posture at hips in standing.   Pt transferred EOB > bedside commode > bedside chair with Mod HHA x 2 persons. B knee blocking required.        Patient left up in chair with all lines intact and call button in reach..    GOALS:   Multidisciplinary Problems     Physical Therapy Goals        Problem: Physical Therapy Goal    Goal Priority Disciplines Outcome Goal Variances Interventions   Physical Therapy Goal     PT, PT/OT Ongoing, Progressing     Description: Goals to be met by: 10/15/2020     Patient will increase functional independence with mobility by performin. Supine to sit with Moderate Assistance - MET 10/5/2020  2. Rolling to Left and Right with Moderate Assistance. - MET 10/5/2020  3. Sit to stand transfer with Moderate Assistance - MET 10/5/2020  4. Sitting at edge of bed x10 minutes with Stand-by Assistance - MET 10/5/2020    REVISED:    1. Supine to sit  with Set-up [place]  2. Sit to supine with Set-up [place]  3. Sit to stand transfer with Supervision  4. Bed to chair transfer with Minimal Assistance using Rolling Walker  5. Gait  x 50 feet with Minimal Assistance using Rolling Walker.                      Time Tracking:     PT Received On: 10/08/20  PT Start Time: 1100     PT Stop Time: 1129  PT Total Time (min): 29 min     Billable Minutes: Gait Training 10 and Therapeutic Activity 19    Treatment Type: Treatment  PT/PTA: PTA     PTA Visit Number: 2     Linus Arevalo, PTA  10/08/2020

## 2020-10-08 NOTE — CONSULTS
DAQUANS at bedside to evaluate picc line.  Both ports flush and aspirate blood.  Site without signs of infection and WNL.  RN aware.

## 2020-10-08 NOTE — TREATMENT PLAN
Hepatology Treatment Plan    Elda Hernandez is a 50 y.o. female admitted to hospital 9/3/2020 (Hospital Day: 36) due to Perforated abdominal viscus. Hepatology following for immunosuppression management.    Lab Results   Component Value Date    TACROLIMUS 6.0 10/08/2020    TACROLIMUS 3.2 (L) 10/07/2020    TACROLIMUS 4.1 (L) 10/06/2020       Lab Results   Component Value Date    CREATININE 0.6 10/08/2020    CREATININE 0.6 10/07/2020    CREATININE 0.6 10/06/2020       Lab Results   Component Value Date    ALT 55 (H) 10/08/2020    AST 92 (H) 10/08/2020     (H) 04/07/2018    ALKPHOS 607 (H) 10/08/2020    BILITOT 3.8 (H) 10/08/2020    WBC 10.95 10/08/2020    HGB 7.2 (L) 10/08/2020     10/08/2020       Kidney function is stable  Liver enzymes are worsening    Plan  - Continue immunosuppression regimen without changes    Immunosuppression Regimen:  Tacrolimus: 0.5mg qAM, 0.3mg qPM sublingual  Although LFTs elevation could be due to TPN, please obtain U/S liver with doppler to rule out an obstructive process.    Please notify hepatology on day of discharge to discuss discharge medications and follow up.     Please obtain daily CBC, BMP, LFT, INR, immunosuppressant trough level    Thank you for involving us in the care of Elda Hernandez. Please call with any additional concerns or questions.    Fatuma Mathews MD  Gastroenterology Fellow

## 2020-10-08 NOTE — PROGRESS NOTES
Patient seen for wound care follow up for midline abdominal incision. Wound granulating,appears to be healing well.   Recommend d/cing wound vac.  Recommend Monday/Thursdays: clean with sterile normal saline, dress with aquacel ag hydrofiber, cover with foam dressing.    Dr. Rocoi Hidalgo with general surgery approved recommendations.  Nursing to continue care. Wound care to follow prn.    8cm x 3cm x 0.5cm

## 2020-10-08 NOTE — PT/OT/SLP PROGRESS
"Occupational Therapy   Treatment    Name: Elda Hernandez  MRN: [id number]  Admitting Diagnosis:  Perforated abdominal viscus  20 Days Post-Op  *co-treatment with PTA  Recommendations:     Discharge Recommendations: nursing facility, skilled  Discharge Equipment Recommendations:  bedside commode, walker, rolling, bath bench  Barriers to discharge:  None    Assessment:     Elda Hernandez is a 50 y.o. female with a medical diagnosis of Perforated abdominal viscus.  She presents with HOB elevated, willing to participate in OT/PTA session. Pt with improvement in activity tolerance and is progressing towards goals. Pt continues to benefit from step-by-step directions of tasks to decrease anxiety. Performance deficits affecting function are weakness, impaired endurance, impaired self care skills, impaired functional mobilty, gait instability, impaired balance, decreased lower extremity function, impaired cardiopulmonary response to activity. Pt would benefit from skilled OT services in order to maximize independence with ADLs and facilitate safe discharge. Pt would benefit from SNF upon discharge to return to PLOF and decrease burden of care. Pt is safe to transfer to bedside chair/BSC x2 assist.    Rehab Prognosis:  Good; patient would benefit from acute skilled OT services to address these deficits and reach maximum level of function.       Plan:     Patient to be seen 4 x/week to address the above listed problems via self-care/home management, therapeutic activities, therapeutic exercises  · Plan of Care Expires: 10/15/20  · Plan of Care Reviewed with: patient    Subjective   "when air hits me I just have to go"  "Oh yes, I would like to sit on the commode if I can"    Pain/Comfort:  · Pain Rating 1: (unrated abdominal pain)    Objective:     Communicated with: RN and PTA prior to session.  Patient found HOB elevated with PICC line, KERRY drain, oxygen, pulse ox (continuous) upon OT entry to room.    General " Precautions: Standard, fall   Orthopedic Precautions:N/A   Braces: N/A     Occupational Performance:     Bed Mobility:    · Patient completed Scooting with contact guard assistance  · Patient completed Supine to Sit with minimum assistance     Functional Mobility/Transfers:  · Patient completed Sit <> Stand Transfer with moderate assistance and of 2 persons  with  hand-held assist   · Pt requires B knee blocking  · Patient completed bed > BSC Toilet Transfer Step Transfer technique with moderate assistance and of 2 persons with  hand-held assist  · Patient completed BSC <> Chair Transfer using Step Transfer technique with moderate assistance and of 2 persons with hand-held assist  · Functional Mobility: Pt took shuffled steps during transfers (bed>BSC>chair)    Activities of Daily Living:  · Feeding:  minimum assistance package manipulation to eat lunch seated in bedside chair  · Lower Body Dressing: maximal assistance to don B socks  · Toileting: minimum assistance clothing management from [facility]; Pt completed anterior annel-care    [facility] 6 Click ADL: 16    Treatment & Education:  -Therapist provided facilitation and instruction of proper body mechanics, energy conservation, and fall prevention strategies during tasks listed above.  -Co-tx with PT performed due to need for education and assistance from two skilled therapy disciplines at pt's current functional level.   -Pt educated on role of OT, POC and goals for therapy  -Pt educated on importance of OOB activities with staff member assistance and sitting OOB majority of the day.   -Pt verbalized understanding. Pt expressed no further concerns/questions  -Whiteboard updated     Patient left up in chair with all lines intact, call button in reach and RN notifiedEducation:      GOALS:   Multidisciplinary Problems     Occupational Therapy Goals        Problem: Occupational Therapy Goal    Goal Priority Disciplines Outcome Interventions   Occupational Therapy Goal      OT, PT/OT Ongoing, Progressing    Description: Goals to be met by: 10/19/20     Patient will increase functional independence with ADLs by performing:    UE Dressing with min A  LE Dressing with min A  Grooming while sitting EOB with min A-MET 10/5  Toileting from BS  with min A for hygiene and clothing management.  -MET 10/8  Toilet transfer to [facility] with min A  Pt will tolerate sitting EOB with VSS with CGA while engaging in functional reaching tasks                      Time Tracking:     OT Date of Treatment: 10/08/20  OT Start Time: 1100  OT Stop Time: 1129  OT Total Time (min): 29 min    Billable Minutes:Self Care/Home Management 19  Therapeutic Activity 10    Stephanie Bautista OT  10/8/2020

## 2020-10-09 LAB
ABO + RH BLD: NORMAL
ALBUMIN SERPL BCP-MCNC: 1.5 G/DL (ref 3.5–5.2)
ALP SERPL-CCNC: 599 U/L (ref 55–135)
ALT SERPL W/O P-5'-P-CCNC: 60 U/L (ref 10–44)
ANION GAP SERPL CALC-SCNC: 9 MMOL/L (ref 8–16)
AST SERPL-CCNC: 89 U/L (ref 10–40)
BASOPHILS # BLD AUTO: 0.05 K/UL (ref 0–0.2)
BASOPHILS # BLD AUTO: 0.06 K/UL (ref 0–0.2)
BASOPHILS NFR BLD: 0.4 % (ref 0–1.9)
BASOPHILS NFR BLD: 0.4 % (ref 0–1.9)
BILIRUB SERPL-MCNC: 4 MG/DL (ref 0.1–1)
BLD GP AB SCN CELLS X3 SERPL QL: NORMAL
BLD PROD TYP BPU: NORMAL
BLOOD UNIT EXPIRATION DATE: NORMAL
BLOOD UNIT TYPE CODE: 7300
BLOOD UNIT TYPE: NORMAL
BUN SERPL-MCNC: 32 MG/DL (ref 6–20)
CALCIUM SERPL-MCNC: 8.3 MG/DL (ref 8.7–10.5)
CHLORIDE SERPL-SCNC: 110 MMOL/L (ref 95–110)
CO2 SERPL-SCNC: 27 MMOL/L (ref 23–29)
CODING SYSTEM: NORMAL
CREAT SERPL-MCNC: 0.6 MG/DL (ref 0.5–1.4)
DIFFERENTIAL METHOD: ABNORMAL
DIFFERENTIAL METHOD: ABNORMAL
DISPENSE STATUS: NORMAL
EOSINOPHIL # BLD AUTO: 0.3 K/UL (ref 0–0.5)
EOSINOPHIL # BLD AUTO: 0.4 K/UL (ref 0–0.5)
EOSINOPHIL NFR BLD: 2.1 % (ref 0–8)
EOSINOPHIL NFR BLD: 3.1 % (ref 0–8)
ERYTHROCYTE [DISTWIDTH] IN BLOOD BY AUTOMATED COUNT: 16.6 % (ref 11.5–14.5)
ERYTHROCYTE [DISTWIDTH] IN BLOOD BY AUTOMATED COUNT: 17.9 % (ref 11.5–14.5)
EST. GFR  (AFRICAN AMERICAN): >60 ML/MIN/1.73 M^2
EST. GFR  (NON AFRICAN AMERICAN): >60 ML/MIN/1.73 M^2
GLUCOSE SERPL-MCNC: 116 MG/DL (ref 70–110)
HCT VFR BLD AUTO: 21.3 % (ref 37–48.5)
HCT VFR BLD AUTO: 25.9 % (ref 37–48.5)
HGB BLD-MCNC: 6.5 G/DL (ref 12–16)
HGB BLD-MCNC: 8 G/DL (ref 12–16)
IMM GRANULOCYTES # BLD AUTO: 0.09 K/UL (ref 0–0.04)
IMM GRANULOCYTES # BLD AUTO: 0.1 K/UL (ref 0–0.04)
IMM GRANULOCYTES NFR BLD AUTO: 0.7 % (ref 0–0.5)
IMM GRANULOCYTES NFR BLD AUTO: 0.8 % (ref 0–0.5)
LYMPHOCYTES # BLD AUTO: 1.3 K/UL (ref 1–4.8)
LYMPHOCYTES # BLD AUTO: 1.3 K/UL (ref 1–4.8)
LYMPHOCYTES NFR BLD: 10.5 % (ref 18–48)
LYMPHOCYTES NFR BLD: 9.6 % (ref 18–48)
MAGNESIUM SERPL-MCNC: 1.6 MG/DL (ref 1.6–2.6)
MCH RBC QN AUTO: 28.3 PG (ref 27–31)
MCH RBC QN AUTO: 28.5 PG (ref 27–31)
MCHC RBC AUTO-ENTMCNC: 30.5 G/DL (ref 32–36)
MCHC RBC AUTO-ENTMCNC: 30.9 G/DL (ref 32–36)
MCV RBC AUTO: 92 FL (ref 82–98)
MCV RBC AUTO: 93 FL (ref 82–98)
MONOCYTES # BLD AUTO: 1.3 K/UL (ref 0.3–1)
MONOCYTES # BLD AUTO: 1.6 K/UL (ref 0.3–1)
MONOCYTES NFR BLD: 10.9 % (ref 4–15)
MONOCYTES NFR BLD: 11.6 % (ref 4–15)
NEUTROPHILS # BLD AUTO: 10.3 K/UL (ref 1.8–7.7)
NEUTROPHILS # BLD AUTO: 8.8 K/UL (ref 1.8–7.7)
NEUTROPHILS NFR BLD: 74.3 % (ref 38–73)
NEUTROPHILS NFR BLD: 75.6 % (ref 38–73)
NRBC BLD-RTO: 0 /100 WBC
NRBC BLD-RTO: 0 /100 WBC
PHOSPHATE SERPL-MCNC: 2.8 MG/DL (ref 2.7–4.5)
PLATELET # BLD AUTO: 148 K/UL (ref 150–350)
PLATELET # BLD AUTO: 155 K/UL (ref 150–350)
PMV BLD AUTO: 12.5 FL (ref 9.2–12.9)
PMV BLD AUTO: 13.4 FL (ref 9.2–12.9)
POCT GLUCOSE: 106 MG/DL (ref 70–110)
POCT GLUCOSE: 110 MG/DL (ref 70–110)
POCT GLUCOSE: 117 MG/DL (ref 70–110)
POCT GLUCOSE: 122 MG/DL (ref 70–110)
POTASSIUM SERPL-SCNC: 3.8 MMOL/L (ref 3.5–5.1)
PROT SERPL-MCNC: 5.8 G/DL (ref 6–8.4)
RBC # BLD AUTO: 2.3 M/UL (ref 4–5.4)
RBC # BLD AUTO: 2.81 M/UL (ref 4–5.4)
SODIUM SERPL-SCNC: 146 MMOL/L (ref 136–145)
TACROLIMUS BLD-MCNC: 6.7 NG/ML (ref 5–15)
TRANS ERYTHROCYTES VOL PATIENT: NORMAL ML
WBC # BLD AUTO: 11.89 K/UL (ref 3.9–12.7)
WBC # BLD AUTO: 13.61 K/UL (ref 3.9–12.7)

## 2020-10-09 PROCEDURE — 63600175 PHARM REV CODE 636 W HCPCS: Performed by: SURGERY

## 2020-10-09 PROCEDURE — 83735 ASSAY OF MAGNESIUM: CPT

## 2020-10-09 PROCEDURE — A4217 STERILE WATER/SALINE, 500 ML: HCPCS | Performed by: SURGERY

## 2020-10-09 PROCEDURE — 97535 SELF CARE MNGMENT TRAINING: CPT

## 2020-10-09 PROCEDURE — 63600175 PHARM REV CODE 636 W HCPCS: Performed by: STUDENT IN AN ORGANIZED HEALTH CARE EDUCATION/TRAINING PROGRAM

## 2020-10-09 PROCEDURE — 86850 RBC ANTIBODY SCREEN: CPT

## 2020-10-09 PROCEDURE — 94760 N-INVAS EAR/PLS OXIMETRY 1: CPT

## 2020-10-09 PROCEDURE — 97116 GAIT TRAINING THERAPY: CPT | Mod: CQ

## 2020-10-09 PROCEDURE — A4216 STERILE WATER/SALINE, 10 ML: HCPCS | Performed by: SURGERY

## 2020-10-09 PROCEDURE — P9021 RED BLOOD CELLS UNIT: HCPCS

## 2020-10-09 PROCEDURE — 25000003 PHARM REV CODE 250: Performed by: STUDENT IN AN ORGANIZED HEALTH CARE EDUCATION/TRAINING PROGRAM

## 2020-10-09 PROCEDURE — 27000221 HC OXYGEN, UP TO 24 HOURS

## 2020-10-09 PROCEDURE — 84100 ASSAY OF PHOSPHORUS: CPT

## 2020-10-09 PROCEDURE — 80197 ASSAY OF TACROLIMUS: CPT

## 2020-10-09 PROCEDURE — 80053 COMPREHEN METABOLIC PANEL: CPT

## 2020-10-09 PROCEDURE — 86920 COMPATIBILITY TEST SPIN: CPT

## 2020-10-09 PROCEDURE — C9113 INJ PANTOPRAZOLE SODIUM, VIA: HCPCS | Performed by: STUDENT IN AN ORGANIZED HEALTH CARE EDUCATION/TRAINING PROGRAM

## 2020-10-09 PROCEDURE — 20600001 HC STEP DOWN PRIVATE ROOM

## 2020-10-09 PROCEDURE — 85025 COMPLETE CBC W/AUTO DIFF WBC: CPT

## 2020-10-09 PROCEDURE — 97530 THERAPEUTIC ACTIVITIES: CPT | Mod: CQ

## 2020-10-09 PROCEDURE — 25000003 PHARM REV CODE 250: Performed by: SURGERY

## 2020-10-09 PROCEDURE — 99900035 HC TECH TIME PER 15 MIN (STAT)

## 2020-10-09 RX ORDER — POLYETHYLENE GLYCOL 3350 17 G/17G
17 POWDER, FOR SOLUTION ORAL DAILY
Status: DISCONTINUED | OUTPATIENT
Start: 2020-10-09 | End: 2020-10-14 | Stop reason: HOSPADM

## 2020-10-09 RX ORDER — TACROLIMUS 1 MG/1
1 CAPSULE ORAL EVERY MORNING
Status: DISCONTINUED | OUTPATIENT
Start: 2020-10-09 | End: 2020-10-10

## 2020-10-09 RX ORDER — TACROLIMUS 0.5 MG/1
0.5 CAPSULE ORAL EVERY EVENING
Status: DISCONTINUED | OUTPATIENT
Start: 2020-10-09 | End: 2020-10-10

## 2020-10-09 RX ORDER — HYDROCODONE BITARTRATE AND ACETAMINOPHEN 500; 5 MG/1; MG/1
TABLET ORAL
Status: DISCONTINUED | OUTPATIENT
Start: 2020-10-09 | End: 2020-10-14 | Stop reason: HOSPADM

## 2020-10-09 RX ADMIN — MEROPENEM AND SODIUM CHLORIDE 1 G: 1 INJECTION, SOLUTION INTRAVENOUS at 08:10

## 2020-10-09 RX ADMIN — Medication 10 ML: at 05:10

## 2020-10-09 RX ADMIN — MUPIROCIN: 20 OINTMENT TOPICAL at 08:10

## 2020-10-09 RX ADMIN — OXYCODONE HYDROCHLORIDE 5 MG: 5 TABLET ORAL at 08:10

## 2020-10-09 RX ADMIN — HEPARIN SODIUM 5000 UNITS: 5000 INJECTION INTRAVENOUS; SUBCUTANEOUS at 05:10

## 2020-10-09 RX ADMIN — FLUCONAZOLE 400 MG: 2 INJECTION, SOLUTION INTRAVENOUS at 04:10

## 2020-10-09 RX ADMIN — MEROPENEM AND SODIUM CHLORIDE 1 G: 1 INJECTION, SOLUTION INTRAVENOUS at 12:10

## 2020-10-09 RX ADMIN — MAGNESIUM SULFATE HEPTAHYDRATE: 500 INJECTION, SOLUTION INTRAMUSCULAR; INTRAVENOUS at 09:10

## 2020-10-09 RX ADMIN — OXYCODONE HYDROCHLORIDE 5 MG: 5 TABLET ORAL at 04:10

## 2020-10-09 RX ADMIN — PANTOPRAZOLE SODIUM 40 MG: 40 INJECTION, POWDER, LYOPHILIZED, FOR SOLUTION INTRAVENOUS at 08:10

## 2020-10-09 RX ADMIN — Medication 10 ML: at 12:10

## 2020-10-09 RX ADMIN — TACROLIMUS 0.5 MG: 0.5 CAPSULE ORAL at 05:10

## 2020-10-09 RX ADMIN — TACROLIMUS 0.5 MG: 1 CAPSULE, GELATIN COATED ORAL at 08:10

## 2020-10-09 RX ADMIN — IRON SUCROSE 200 MG: 20 INJECTION, SOLUTION INTRAVENOUS at 12:10

## 2020-10-09 RX ADMIN — ONDANSETRON 4 MG: 2 INJECTION INTRAMUSCULAR; INTRAVENOUS at 04:10

## 2020-10-09 RX ADMIN — HEPARIN SODIUM 5000 UNITS: 5000 INJECTION INTRAVENOUS; SUBCUTANEOUS at 01:10

## 2020-10-09 RX ADMIN — OXYCODONE HYDROCHLORIDE 5 MG: 5 TABLET ORAL at 05:10

## 2020-10-09 RX ADMIN — LEVOTHYROXINE SODIUM ANHYDROUS 40 MCG: 100 INJECTION, POWDER, LYOPHILIZED, FOR SOLUTION INTRAVENOUS at 08:10

## 2020-10-09 RX ADMIN — TACROLIMUS 1 MG: 1 CAPSULE ORAL at 12:10

## 2020-10-09 RX ADMIN — HEPARIN SODIUM 5000 UNITS: 5000 INJECTION INTRAVENOUS; SUBCUTANEOUS at 09:10

## 2020-10-09 RX ADMIN — MEROPENEM AND SODIUM CHLORIDE 1 G: 1 INJECTION, SOLUTION INTRAVENOUS at 03:10

## 2020-10-09 NOTE — PT/OT/SLP PROGRESS
Occupational Therapy   Treatment    Name: Elda Hernandez  MRN: [id number]  Admitting Diagnosis:  Perforated abdominal viscus  21 Days Post-Op  *co-treatment with PTA    Recommendations:     Discharge Recommendations: nursing facility, skilled  Discharge Equipment Recommendations:  bedside commode, walker, rolling, wheelchair, bath bench  Barriers to discharge:  None    Assessment:     Elda Hernandez is a 50 y.o. female with a medical diagnosis of Perforated abdominal viscus.  She presents with HOB elevated willing to participate in OT/PTA session. Pt with improvement in transfers and activity tolerance this date. Pt with decreased anxiety as compared to previous session, but continues to benefit from step-by-step direction of tasks. Performance deficits affecting function are weakness, impaired endurance, impaired self care skills, impaired functional mobilty, gait instability, impaired balance, decreased lower extremity function, impaired cardiopulmonary response to activity. Pt would benefit from skilled OT services in order to maximize independence with ADLs and facilitate safe discharge. Pt would benefit from SNF upon discharge to return to [facility] and decrease burden of care.     Rehab Prognosis:  Good; patient would benefit from acute skilled OT services to address these deficits and reach maximum level of function.       Plan:     Patient to be seen 4 x/week to address the above listed problems via self-care/home management, therapeutic activities, therapeutic exercises  · Plan of Care Expires: 10/15/20  · Plan of Care Reviewed with: patient    Subjective     Pain/Comfort:  · Pain Rating 1: (reports L sided pain 5/10)    Objective:     Communicated with: RN and PTA prior to session.  Patient found HOB elevated with PICC line, KERRY drain, oxygen, pulse ox (continuous) upon OT entry to room.    General Precautions: Standard, fall   Orthopedic Precautions:N/A   Braces: N/A     Occupational Performance:     Bed  Mobility:    · Patient completed Rolling/Turning to Left with  contact guard assistance  · Patient completed Scooting/Bridging with minimum assistance  · Patient completed Supine to Sit with minimum assistance     Functional Mobility/Transfers:  · Patient completed Sit <> Stand Transfer with minimum assistance and of 2 persons  with  hand-held assist   · Patient completed Bed> BSC Toilet Transfer Step Transfer technique with minimum assistance and of 2 persons with  hand-held assist  · Patient completed BSC<> Chair Transfer using Step Transfer technique with moderate assistance with hand-held assist    Activities of Daily Living:  · Feeding: set-up A to drink water   · Toileting: moderate assistance hygiene from [facility]; pt required assistance with posterior hygiene       [facility] 6 Click ADL: 16    Treatment & Education:  -Therapist provided facilitation and instruction of proper body mechanics, energy conservation, and fall prevention strategies during tasks listed above.  -Co-tx with PTA performed due to need for education and assistance from two skilled therapy disciplines at pt's current functional level.    -Pt educated on role of OT, POC and goals for therapy  -Pt educated on importance of OOB activities with staff member assistance and sitting OOB majority of the day.   -Pt verbalized understanding. Pt expressed no further concerns/questions  -Whiteboard updated    Patient left up in chair with all lines intact, call button in reach and RN notifiedEducation:      GOALS:   Multidisciplinary Problems     Occupational Therapy Goals        Problem: Occupational Therapy Goal    Goal Priority Disciplines Outcome Interventions   Occupational Therapy Goal     OT, PT/OT Ongoing, Progressing    Description: Goals to be met by: 10/19/20     Patient will increase functional independence with ADLs by performing:    UE Dressing with min A  LE Dressing with min A  Grooming while sitting EOB with min A-MET 10/5  Toileting from [facility]   with min A for hygiene and clothing management.  -MET 10/8  Toilet transfer to BSC with min A  Pt will tolerate sitting EOB with VSS with CGA while engaging in functional reaching tasks                      Time Tracking:     OT Date of Treatment: 10/09/20  OT Start Time: 1438  OT Stop Time: 1506  OT Total Time (min): 28 min    Billable Minutes:Self Care/Home Management 28    Stephanie Bautista OT  10/9/2020

## 2020-10-09 NOTE — PROGRESS NOTES
[facility]  General Surgery  Progress Note    Subjective:     History of Present Illness:  Ms. Hernandez is a 51yo F w/PMH of von Gierke disease s/p liver transplant (2002, on tacro + MMF, follows Dr. Nielsen), hx chronic rejection (bx 2015 with acute and chronic rejection s/p steroids), biliary stricture and ductopenic rejection, recently with cirrhosis on fibroscan (10/2019), HTN, and depression who presents as a transfer for further evaluation of gastric outlet obstruction and esophageal stricturing.  Patient decompensated requiring multiple pressors and intubation. CT scan showed free air. Prior to intubation patient reported severe abdominal pain.   states that patient has had epigastric pain, nausea, and vomiting for several days.     Post-Op Info:  Procedure(s) (LRB):  LAPAROTOMY, EXPLORATORY, DRAINAGE OF ABSCESS, REPAIR OF GASTRIC PERFORATION, GASTROSTOMY TUBE PLACEMENT (N/A)  APPLICATION, WOUND VAC (N/A)   20 Days Post-Op     Interval History: No acute events overnight. Tolerating CLD. Pending approval for SNF    Medications:  Continuous Infusions:   TPN ADULT CENTRAL LINE CUSTOM       Scheduled Meds:   bisacodyL  10 mg Rectal Daily    fluconazole (DIFLUCAN) IVPB  400 mg Intravenous Q24H    heparin (porcine)  5,000 Units Subcutaneous Q8H    levothyroxine  40 mcg Intravenous Daily    meropenem (MERREM) IVPB  1 g Intravenous Q8H    mupirocin   Nasal BID    pantoprazole  40 mg Intravenous Daily    sodium chloride 0.9%  10 mL Intravenous Q6H    tacrolimus  0.5 mg Oral Daily AM    tacrolimus  0.3 mg Oral Daily PM     PRN Meds:acetaminophen, albuterol-ipratropium, dextrose 50%, dextrose 50%, enalaprilat, glucagon (human recombinant), insulin aspart U-100, iohexol, ondansetron, oxyCODONE, promethazine, sodium chloride 0.9%, Flushing PICC Protocol **AND** sodium chloride 0.9% **AND** sodium chloride 0.9%     Review of patient's allergies indicates:   Allergen Reactions     Codeine Itching     Other reaction(s): Itching    Lipitor [atorvastatin] Other (See Comments)     Other reaction(s): Muscle pain  Muscle cranmps    Morphine Itching     Other reaction(s): nausea and vomiting     Zoloft [sertraline] Other (See Comments)     Tremors/muscle spasms     Objective:     Vital Signs (Most Recent):  Temp: 98.8 °F (37.1 °C) (10/08/20 1524)  Pulse: 104 (10/08/20 1524)  Resp: 18 (10/08/20 1800)  BP: (!) 160/72 (10/08/20 1524)  SpO2: 100 % (10/08/20 1727) Vital Signs (24h Range):  Temp:  [98.2 °F (36.8 °C)-98.8 °F (37.1 °C)] 98.8 °F (37.1 °C)  Pulse:  [] 104  Resp:  [18-30] 18  SpO2:  [94 %-100 %] 100 %  BP: (124-172)/(65-90) 160/72     Weight: 67.8 kg (149 lb 7.6 oz)  Body mass index is 30.19 kg/m².    Intake/Output - Last 3 Shifts       10/06 0700 - 10/07 0659 10/07 0700 - 10/08 0659 10/08 0700 - 10/09 0659    P.O. 30 360 50    NG/GT       IV Piggyback 350 300      1658     Total Intake(mL/kg) 1303 (19.2) 2318 (34.2) 50 (0.7)    Urine (mL/kg/hr) 1500 (0.9) 1675 (1) 600 (0.7)    Emesis/NG output 0 0     Drains 457.5 355 275    Other 0 0     Stool 0 0     Total Output 1957.5 2030 875    Net -654.5 +288 -825           Urine Occurrence 3 x  1 x    Stool Occurrence 1 x 0 x 0 x    Emesis Occurrence 0 x 0 x           Physical Exam  Vitals signs and nursing note reviewed.   HENT:      Head: Normocephalic and atraumatic.      Mouth/Throat:      Mouth: Mucous membranes are dry.   Eyes:      General: No scleral icterus.  Cardiovascular:      Rate and Rhythm: Normal rate and regular rhythm.      Heart sounds: Normal heart sounds.   Abdominal:      General: There is distension.      Palpations: Abdomen is soft.      Comments: Midline incision with wound vac in place; excellent seal to wound vac.   L abd drains in place with mild serous output; G tube to gravity.   Skin:     General: Skin is warm and dry.      Coloration: Skin is not jaundiced.   Neurological:      Mental Status: She is  alert.      Cranial Nerves: No cranial nerve deficit.      Sensory: No sensory deficit.      Motor: No weakness.         Significant Labs:  CBC:   Recent Labs   Lab 10/08/20  0352   WBC 10.95   RBC 2.54*   HGB 7.2*   HCT 23.7*      MCV 93   MCH 28.3   MCHC 30.4*     CMP:   Recent Labs   Lab 10/08/20  0352   *   CALCIUM 8.3*   ALBUMIN 1.5*   PROT 5.9*      K 3.9   CO2 27   *   BUN 29*   CREATININE 0.6   ALKPHOS 607*   ALT 55*   AST 92*   BILITOT 3.8*       Significant Diagnostics:  I have reviewed all pertinent imaging results/findings within the past 24 hours.    Assessment/Plan:     * Perforated abdominal viscus  50 y.o. female w/ free air on CT scan. S/p emergent ex lap on 9/4 w/ findings of gastric perforation, primary repair. Now w/  abdominal closure on 9/6, skin stapled closed, KERRY drains removed. Class A to OR on 9/18 for free air and extrav or oral contrast seen on CT. CT on 9/25 redemonstrated contrast leaking from stomach, but well-drained with surgical drain.    Reg diet/TPN  G tube clamped  Wound vac changes w/ wound care  PT/OT    Dispo: Discharge planning, recommending SNF         Rocio Hidalgo MD  General Surgery  [facility]

## 2020-10-09 NOTE — TREATMENT PLAN
Hepatology Treatment Plan    Elda Hernandez is a 50 y.o. female admitted to hospital 9/3/2020 (Hospital Day: 37) due to Perforated abdominal viscus. Hepatology following for immunosuppression management.    Lab Results   Component Value Date    TACROLIMUS 6.7 10/09/2020    TACROLIMUS 6.0 10/08/2020    TACROLIMUS 3.2 (L) 10/07/2020       Lab Results   Component Value Date    CREATININE 0.6 10/09/2020    CREATININE 0.6 10/08/2020    CREATININE 0.6 10/07/2020       Lab Results   Component Value Date    ALT 60 (H) 10/09/2020    AST 89 (H) 10/09/2020     (H) 04/07/2018    ALKPHOS 599 (H) 10/09/2020    BILITOT 4.0 (H) 10/09/2020    WBC 11.89 10/09/2020    HGB 6.5 (L) 10/09/2020     (L) 10/09/2020       Kidney function is stable  Liver enzymes are stable    Plan  - Changes to immunosuppression regimen as below    Immunosuppression Regimen:  Tacrolimus: 1mg qAM, 0.5mg qPM    Please notify hepatology on day of discharge to discuss discharge medications and follow up.     Please obtain daily CBC, BMP, LFT, INR, immunosuppressant trough level    Thank you for involving us in the care of Elda Hernandez. Please call with any additional concerns or questions.    Fatuma Mathews MD  Gastroenterology Fellow

## 2020-10-09 NOTE — PLAN OF CARE
Problem: Occupational Therapy Goal  Goal: Occupational Therapy Goal  Description: Goals to be met by: 10/19/20     Patient will increase functional independence with ADLs by performing:    UE Dressing with min A  LE Dressing with min A  Grooming while sitting EOB with min A-MET 10/5  Toileting from BS  with min A for hygiene and clothing management.  -MET 10/8  Toilet transfer to BS with min A  Pt will tolerate sitting EOB with VSS with CGA while engaging in functional reaching tasks     Outcome: Ongoing, Progressing     Goals reviewed and remain appropriate, continue POC    PAULINE Rob  10/9/2020   Pager #: [phone]

## 2020-10-09 NOTE — PT/OT/SLP PROGRESS
Physical Therapy Treatment   -cotx with OT for assistance needed    Patient Name:  Elda Hernandez   MRN:  [id number]    Recommendations:     Discharge Recommendations:  nursing facility, skilled   Discharge Equipment Recommendations: bedside commode, bath bench, walker, rolling, wheelchair   Barriers to discharge: Inaccessible home and Decreased caregiver support    Assessment:     Elda Hernandez is a 50 y.o. female admitted with a medical diagnosis of Perforated abdominal viscus.  She presents with the following impairments/functional limitations:  weakness, impaired endurance, impaired self care skills, impaired functional mobilty, gait instability, impaired balance, decreased lower extremity function, impaired cardiopulmonary response to activity, decreased safety awareness, decreased coordination.    Rehab Prognosis: Good; patient would benefit from acute skilled PT services to address these deficits and reach maximum level of function.    Recent Surgery: Procedure(s) (LRB):  LAPAROTOMY, EXPLORATORY, DRAINAGE OF ABSCESS, REPAIR OF GASTRIC PERFORATION, GASTROSTOMY TUBE PLACEMENT (N/A)  APPLICATION, WOUND VAC (N/A) 21 Days Post-Op    Plan:     During this hospitalization, patient to be seen 4 x/week to address the identified rehab impairments via gait training, therapeutic activities, therapeutic exercises, neuromuscular re-education and progress toward the following goals:    · Plan of Care Expires:  11/01/20    Subjective     Chief Complaint: weakness  Pain/Comfort:  · Pain Rating 1: 5/10  · Location - Side 1: Right  · Location - Orientation 1: generalized  · Location 1: abdomen  · Pain Addressed 1: Reposition, Distraction, Nurse notified      Objective:     Communicated with NSG prior to session.  Patient found HOB elevated with PICC line, KERRY drain, oxygen, pulse ox (continuous), PureWick upon PTA/OT entry to room.     General Precautions: Standard, fall   Orthopedic Precautions:N/A   Braces: N/A      Functional Mobility:  · Bed Mobility:     · Rolling Left:  stand by assistance  · Scooting: contact guard assistance  · Supine to Sit: minimum assistance  · Transfers:     · Sit to Stand:  moderate assistance with no AD  · Bed to Chair: maximal assistance with  no AD  using  Stand Pivot  · Gait: Pt shuffles to take steps with stand pivot transfers with ~3 ft distances covered x 2 trials. Pt requires assistance for weight shift with pt inconsistently able to clear BLE for steps. Several episodes of B knee buckling throughout requiring frequent blokcing and recovery.       AM-PAC 6 CLICK MOBILITY  Turning over in bed (including adjusting bedclothes, sheets and blankets)?: 3  Sitting down on and standing up from a chair with arms (e.g., wheelchair, bedside commode, etc.): 2  Moving from lying on back to sitting on the side of the bed?: 3  Moving to and from a bed to a chair (including a wheelchair)?: 2  Need to walk in hospital room?: 2  Climbing 3-5 steps with a railing?: 1  Basic Mobility Total Score: 13       Patient left up in chair with all lines intact, call button in reach and NSG notified..    GOALS:   Multidisciplinary Problems     Physical Therapy Goals        Problem: Physical Therapy Goal    Goal Priority Disciplines Outcome Goal Variances Interventions   Physical Therapy Goal     PT, PT/OT Ongoing, Progressing     Description: Goals to be met by: 10/15/2020     Patient will increase functional independence with mobility by performin. Supine to sit with Moderate Assistance - MET 10/5/2020  2. Rolling to Left and Right with Moderate Assistance. - MET 10/5/2020  3. Sit to stand transfer with Moderate Assistance - MET 10/5/2020  4. Sitting at edge of bed x10 minutes with Stand-by Assistance - MET 10/5/2020    REVISED:    1. Supine to sit with Set-up [place]  2. Sit to supine with Set-up [place]  3. Sit to stand transfer with Supervision  4. Bed to chair transfer with Minimal Assistance  using Rolling Walker  5. Gait  x 50 feet with Minimal Assistance using Rolling Walker.                      Time Tracking:     PT Received On: 10/09/20  PT Start Time: 1438     PT Stop Time: 1506  PT Total Time (min): 28 min     Billable Minutes: Gait Training 10 and Therapeutic Activity 14    Treatment Type: Treatment  PT/PTA: PTA     PTA Visit Number: 3     Linus Arevalo, PTA  10/09/2020

## 2020-10-09 NOTE — SUBJECTIVE & OBJECTIVE
Interval History: No acute events overnight. Tolerating reg diet. Wound vac discontinued yesterday. Drains removed today. Pending approval for SNF    Medications:  Continuous Infusions:   TPN ADULT CENTRAL LINE CUSTOM 60 mL/hr at 10/08/20 2226     Scheduled Meds:   bisacodyL  10 mg Rectal Daily    fluconazole (DIFLUCAN) IVPB  400 mg Intravenous Q24H    heparin (porcine)  5,000 Units Subcutaneous Q8H    levothyroxine  40 mcg Intravenous Daily    meropenem (MERREM) IVPB  1 g Intravenous Q8H    mupirocin   Nasal BID    pantoprazole  40 mg Intravenous Daily    polyethylene glycol  17 g Oral Daily    sodium chloride 0.9%  10 mL Intravenous Q6H    tacrolimus  0.5 mg Oral Daily AM    tacrolimus  0.3 mg Oral Daily PM     PRN Meds:sodium chloride, acetaminophen, albuterol-ipratropium, dextrose 50%, dextrose 50%, enalaprilat, glucagon (human recombinant), insulin aspart U-100, iohexol, ondansetron, oxyCODONE, promethazine, sodium chloride 0.9%, Flushing PICC Protocol **AND** sodium chloride 0.9% **AND** sodium chloride 0.9%     Review of patient's allergies indicates:   Allergen Reactions    Codeine Itching     Other reaction(s): Itching    Lipitor [atorvastatin] Other (See Comments)     Other reaction(s): Muscle pain  Muscle cranmps    Morphine Itching     Other reaction(s): nausea and vomiting     Zoloft [sertraline] Other (See Comments)     Tremors/muscle spasms     Objective:     Vital Signs (Most Recent):  Temp: 98.6 °F (37 °C) (10/09/20 0724)  Pulse: 102 (10/09/20 0724)  Resp: 20 (10/09/20 0724)  BP: (!) 145/98 (10/09/20 0724)  SpO2: (!) 92 % (10/09/20 0724) Vital Signs (24h Range):  Temp:  [98.3 °F (36.8 °C)-99.2 °F (37.3 °C)] 98.6 °F (37 °C)  Pulse:  [101-113] 102  Resp:  [16-20] 20  SpO2:  [92 %-100 %] 92 %  BP: (124-160)/(65-98) 145/98     Weight: 67.8 kg (149 lb 7.6 oz)  Body mass index is 30.19 kg/m².    Intake/Output - Last 3 Shifts       10/07 0700 - 10/08 0659 10/08 0700 - 10/09 0659 10/09 0700  - 10/10 0659    P.O. 360 50     IV Piggyback 300 200     TPN 1658 1416     Total Intake(mL/kg) 2318 (34.2) 1666 (24.6)     Urine (mL/kg/hr) 1675 (1) 1700 (1)     Emesis/NG output 0      Drains 355 500     Other 0      Stool 0 0     Total Output 2030 2200     Net +288 -534            Urine Occurrence  1 x     Stool Occurrence 0 x 0 x     Emesis Occurrence 0 x            Physical Exam  Vitals signs and nursing note reviewed.   HENT:      Head: Normocephalic and atraumatic.      Mouth/Throat:      Mouth: Mucous membranes are dry.   Eyes:      General: No scleral icterus.  Cardiovascular:      Rate and Rhythm: Normal rate and regular rhythm.      Heart sounds: Normal heart sounds.   Abdominal:      General: There is distension.      Palpations: Abdomen is soft.      Comments: Midline incision with wound vac in place; excellent seal to wound vac.   L abd drains in place with mild serous output; G tube to gravity.   Skin:     General: Skin is warm and dry.      Coloration: Skin is not jaundiced.   Neurological:      Mental Status: She is alert.      Cranial Nerves: No cranial nerve deficit.      Sensory: No sensory deficit.      Motor: No weakness.         Significant Labs:  CBC:   Recent Labs   Lab 10/09/20  0521   WBC 11.89   RBC 2.30*   HGB 6.5*   HCT 21.3*   *   MCV 93   MCH 28.3   MCHC 30.5*     CMP:   Recent Labs   Lab 10/09/20  0521   *   CALCIUM 8.3*   ALBUMIN 1.5*   PROT 5.8*   *   K 3.8   CO2 27      BUN 32*   CREATININE 0.6   ALKPHOS 599*   ALT 60*   AST 89*   BILITOT 4.0*       Significant Diagnostics:  I have reviewed all pertinent imaging results/findings within the past 24 hours.

## 2020-10-09 NOTE — PLAN OF CARE
10/09/20 1548   Post-Acute Status   Post-Acute Authorization Placement   Post-Acute Placement Status Pending Payor Review   SW spoke with Susana at [facility]. They have filed the fast appeal letter to pt's insurance.    Chuyita Monterroso LMSW  [facility]  [phone]

## 2020-10-09 NOTE — SUBJECTIVE & OBJECTIVE
Interval History: No acute events overnight. Tolerating CLD. Pending approval for SNF    Medications:  Continuous Infusions:   TPN ADULT CENTRAL LINE CUSTOM       Scheduled Meds:   bisacodyL  10 mg Rectal Daily    fluconazole (DIFLUCAN) IVPB  400 mg Intravenous Q24H    heparin (porcine)  5,000 Units Subcutaneous Q8H    levothyroxine  40 mcg Intravenous Daily    meropenem (MERREM) IVPB  1 g Intravenous Q8H    mupirocin   Nasal BID    pantoprazole  40 mg Intravenous Daily    sodium chloride 0.9%  10 mL Intravenous Q6H    tacrolimus  0.5 mg Oral Daily AM    tacrolimus  0.3 mg Oral Daily PM     PRN Meds:acetaminophen, albuterol-ipratropium, dextrose 50%, dextrose 50%, enalaprilat, glucagon (human recombinant), insulin aspart U-100, iohexol, ondansetron, oxyCODONE, promethazine, sodium chloride 0.9%, Flushing PICC Protocol **AND** sodium chloride 0.9% **AND** sodium chloride 0.9%     Review of patient's allergies indicates:   Allergen Reactions    Codeine Itching     Other reaction(s): Itching    Lipitor [atorvastatin] Other (See Comments)     Other reaction(s): Muscle pain  Muscle cranmps    Morphine Itching     Other reaction(s): nausea and vomiting     Zoloft [sertraline] Other (See Comments)     Tremors/muscle spasms     Objective:     Vital Signs (Most Recent):  Temp: 98.8 °F (37.1 °C) (10/08/20 1524)  Pulse: 104 (10/08/20 1524)  Resp: 18 (10/08/20 1800)  BP: (!) 160/72 (10/08/20 1524)  SpO2: 100 % (10/08/20 1727) Vital Signs (24h Range):  Temp:  [98.2 °F (36.8 °C)-98.8 °F (37.1 °C)] 98.8 °F (37.1 °C)  Pulse:  [] 104  Resp:  [18-30] 18  SpO2:  [94 %-100 %] 100 %  BP: (124-172)/(65-90) 160/72     Weight: 67.8 kg (149 lb 7.6 oz)  Body mass index is 30.19 kg/m².    Intake/Output - Last 3 Shifts       10/06 0700 - 10/07 0659 10/07 0700 - 10/08 0659 10/08 0700 - 10/09 0659    P.O. 30 360 50    NG/GT       IV Piggyback 350 300      1658     Total Intake(mL/kg) 1303 (19.2) 2318 (34.2) 50 (0.7)     Urine (mL/kg/hr) 1500 (0.9) 1675 (1) 600 (0.7)    Emesis/NG output 0 0     Drains 457.5 355 275    Other 0 0     Stool 0 0     Total Output 1957.5 2030 875    Net -654.5 +288 -825           Urine Occurrence 3 x  1 x    Stool Occurrence 1 x 0 x 0 x    Emesis Occurrence 0 x 0 x           Physical Exam  Vitals signs and nursing note reviewed.   HENT:      Head: Normocephalic and atraumatic.      Mouth/Throat:      Mouth: Mucous membranes are dry.   Eyes:      General: No scleral icterus.  Cardiovascular:      Rate and Rhythm: Normal rate and regular rhythm.      Heart sounds: Normal heart sounds.   Abdominal:      General: There is distension.      Palpations: Abdomen is soft.      Comments: Midline incision with wound vac in place; excellent seal to wound vac.   L abd drains in place with mild serous output; G tube to gravity.   Skin:     General: Skin is warm and dry.      Coloration: Skin is not jaundiced.   Neurological:      Mental Status: She is alert.      Cranial Nerves: No cranial nerve deficit.      Sensory: No sensory deficit.      Motor: No weakness.         Significant Labs:  CBC:   Recent Labs   Lab 10/08/20  0352   WBC 10.95   RBC 2.54*   HGB 7.2*   HCT 23.7*      MCV 93   MCH 28.3   MCHC 30.4*     CMP:   Recent Labs   Lab 10/08/20  0352   *   CALCIUM 8.3*   ALBUMIN 1.5*   PROT 5.9*      K 3.9   CO2 27   *   BUN 29*   CREATININE 0.6   ALKPHOS 607*   ALT 55*   AST 92*   BILITOT 3.8*       Significant Diagnostics:  I have reviewed all pertinent imaging results/findings within the past 24 hours.

## 2020-10-09 NOTE — ASSESSMENT & PLAN NOTE
50 y.o. female w/ free air on CT scan. S/p emergent ex lap on 9/4 w/ findings of gastric perforation, primary repair. Now w/  abdominal closure on 9/6, skin stapled closed, KERRY drains removed. Class A to OR on 9/18 for free air and extrav or oral contrast seen on CT. CT on 9/25 redemonstrated contrast leaking from stomach, but well-drained with surgical drain.    Reg diet/TPN  G tube clamped  Wound vac changes w/ wound care  PT/OT    Dispo: Discharge planning, recommending SNF

## 2020-10-09 NOTE — ASSESSMENT & PLAN NOTE
50 y.o. female w/ free air on CT scan. S/p emergent ex lap on 9/4 w/ findings of gastric perforation, primary repair. Now w/  abdominal closure on 9/6, skin stapled closed, KERYR drains removed. Class A to OR on 9/18 for free air and extrav or oral contrast seen on CT. CT on 9/25 redemonstrated contrast leaking from stomach, but well-drained with surgical drain.    Reg diet/TPN  G tube clamped  Wound vac discontinued. Continue local wound care  PT/OT    Dispo: Discharge planning, recommending SNF

## 2020-10-09 NOTE — PROGRESS NOTES
[facility]  General Surgery  Progress Note    Subjective:     History of Present Illness:  Ms. Hernandez is a 51yo F w/PMH of von Gierke disease s/p liver transplant (2002, on tacro + MMF, follows Dr. Nielsen), hx chronic rejection (bx 2015 with acute and chronic rejection s/p steroids), biliary stricture and ductopenic rejection, recently with cirrhosis on fibroscan (10/2019), HTN, and depression who presents as a transfer for further evaluation of gastric outlet obstruction and esophageal stricturing.  Patient decompensated requiring multiple pressors and intubation. CT scan showed free air. Prior to intubation patient reported severe abdominal pain.   states that patient has had epigastric pain, nausea, and vomiting for several days.     Post-Op Info:  Procedure(s) (LRB):  LAPAROTOMY, EXPLORATORY, DRAINAGE OF ABSCESS, REPAIR OF GASTRIC PERFORATION, GASTROSTOMY TUBE PLACEMENT (N/A)  APPLICATION, WOUND VAC (N/A)   21 Days Post-Op     Interval History: No acute events overnight. Tolerating reg diet. Wound vac discontinued yesterday. Drains removed today. Pending approval for SNF    Medications:  Continuous Infusions:   TPN ADULT CENTRAL LINE CUSTOM 60 mL/hr at 10/08/20 2226     Scheduled Meds:   bisacodyL  10 mg Rectal Daily    fluconazole (DIFLUCAN) IVPB  400 mg Intravenous Q24H    heparin (porcine)  5,000 Units Subcutaneous Q8H    levothyroxine  40 mcg Intravenous Daily    meropenem (MERREM) IVPB  1 g Intravenous Q8H    mupirocin   Nasal BID    pantoprazole  40 mg Intravenous Daily    polyethylene glycol  17 g Oral Daily    sodium chloride 0.9%  10 mL Intravenous Q6H    tacrolimus  0.5 mg Oral Daily AM    tacrolimus  0.3 mg Oral Daily PM     PRN Meds:sodium chloride, acetaminophen, albuterol-ipratropium, dextrose 50%, dextrose 50%, enalaprilat, glucagon (human recombinant), insulin aspart U-100, iohexol, ondansetron, oxyCODONE, promethazine, sodium chloride 0.9%, Flushing PICC  Protocol **AND** sodium chloride 0.9% **AND** sodium chloride 0.9%     Review of patient's allergies indicates:   Allergen Reactions    Codeine Itching     Other reaction(s): Itching    Lipitor [atorvastatin] Other (See Comments)     Other reaction(s): Muscle pain  Muscle cranmps    Morphine Itching     Other reaction(s): nausea and vomiting     Zoloft [sertraline] Other (See Comments)     Tremors/muscle spasms     Objective:     Vital Signs (Most Recent):  Temp: 98.6 °F (37 °C) (10/09/20 0724)  Pulse: 102 (10/09/20 0724)  Resp: 20 (10/09/20 0724)  BP: (!) 145/98 (10/09/20 0724)  SpO2: (!) 92 % (10/09/20 0724) Vital Signs (24h Range):  Temp:  [98.3 °F (36.8 °C)-99.2 °F (37.3 °C)] 98.6 °F (37 °C)  Pulse:  [101-113] 102  Resp:  [16-20] 20  SpO2:  [92 %-100 %] 92 %  BP: (124-160)/(65-98) 145/98     Weight: 67.8 kg (149 lb 7.6 oz)  Body mass index is 30.19 kg/m².    Intake/Output - Last 3 Shifts       10/07 0700 - 10/08 0659 10/08 0700 - 10/09 0659 10/09 0700 - 10/10 0659    P.O. 360 50     IV Piggyback 300 200     TPN 1658 1416     Total Intake(mL/kg) 2318 (34.2) 1666 (24.6)     Urine (mL/kg/hr) 1675 (1) 1700 (1)     Emesis/NG output 0      Drains 355 500     Other 0      Stool 0 0     Total Output 2030 2200     Net +288 -534            Urine Occurrence  1 x     Stool Occurrence 0 x 0 x     Emesis Occurrence 0 x            Physical Exam  Vitals signs and nursing note reviewed.   HENT:      Head: Normocephalic and atraumatic.      Mouth/Throat:      Mouth: Mucous membranes are dry.   Eyes:      General: No scleral icterus.  Cardiovascular:      Rate and Rhythm: Normal rate and regular rhythm.      Heart sounds: Normal heart sounds.   Abdominal:      General: There is distension.      Palpations: Abdomen is soft.      Comments: Midline incision with wound vac in place; excellent seal to wound vac.   L abd drains in place with mild serous output; G tube to gravity.   Skin:     General: Skin is warm and dry.       Coloration: Skin is not jaundiced.   Neurological:      Mental Status: She is alert.      Cranial Nerves: No cranial nerve deficit.      Sensory: No sensory deficit.      Motor: No weakness.         Significant Labs:  CBC:   Recent Labs   Lab 10/09/20  0521   WBC 11.89   RBC 2.30*   HGB 6.5*   HCT 21.3*   *   MCV 93   MCH 28.3   MCHC 30.5*     CMP:   Recent Labs   Lab 10/09/20  0521   *   CALCIUM 8.3*   ALBUMIN 1.5*   PROT 5.8*   *   K 3.8   CO2 27      BUN 32*   CREATININE 0.6   ALKPHOS 599*   ALT 60*   AST 89*   BILITOT 4.0*       Significant Diagnostics:  I have reviewed all pertinent imaging results/findings within the past 24 hours.    Assessment/Plan:     * Perforated abdominal viscus  50 y.o. female w/ free air on CT scan. S/p emergent ex lap on 9/4 w/ findings of gastric perforation, primary repair. Now w/  abdominal closure on 9/6, skin stapled closed, KERRY drains removed. Class A to OR on 9/18 for free air and extrav or oral contrast seen on CT. CT on 9/25 redemonstrated contrast leaking from stomach, but well-drained with surgical drain.    Reg diet/TPN  G tube clamped  Wound vac discontinued. Continue local wound care  PT/OT    Dispo: Discharge planning, recommending SNF         Rocio Hidalgo MD  General Surgery  [facility]

## 2020-10-10 LAB
ALBUMIN SERPL BCP-MCNC: 1.4 G/DL (ref 3.5–5.2)
ALP SERPL-CCNC: 547 U/L (ref 55–135)
ALT SERPL W/O P-5'-P-CCNC: 58 U/L (ref 10–44)
ANION GAP SERPL CALC-SCNC: 7 MMOL/L (ref 8–16)
ANISOCYTOSIS BLD QL SMEAR: SLIGHT
AST SERPL-CCNC: 84 U/L (ref 10–40)
BASOPHILS # BLD AUTO: 0.08 K/UL (ref 0–0.2)
BASOPHILS NFR BLD: 0.5 % (ref 0–1.9)
BILIRUB SERPL-MCNC: 4.2 MG/DL (ref 0.1–1)
BUN SERPL-MCNC: 32 MG/DL (ref 6–20)
CALCIUM SERPL-MCNC: 8.4 MG/DL (ref 8.7–10.5)
CHLORIDE SERPL-SCNC: 107 MMOL/L (ref 95–110)
CO2 SERPL-SCNC: 28 MMOL/L (ref 23–29)
CREAT SERPL-MCNC: 0.6 MG/DL (ref 0.5–1.4)
DIFFERENTIAL METHOD: ABNORMAL
EOSINOPHIL # BLD AUTO: 0.4 K/UL (ref 0–0.5)
EOSINOPHIL NFR BLD: 2.6 % (ref 0–8)
ERYTHROCYTE [DISTWIDTH] IN BLOOD BY AUTOMATED COUNT: 16.9 % (ref 11.5–14.5)
EST. GFR  (AFRICAN AMERICAN): >60 ML/MIN/1.73 M^2
EST. GFR  (NON AFRICAN AMERICAN): >60 ML/MIN/1.73 M^2
GLUCOSE SERPL-MCNC: 108 MG/DL (ref 70–110)
HCT VFR BLD AUTO: 24.1 % (ref 37–48.5)
HGB BLD-MCNC: 7.4 G/DL (ref 12–16)
HYPOCHROMIA BLD QL SMEAR: ABNORMAL
IMM GRANULOCYTES # BLD AUTO: 0.14 K/UL (ref 0–0.04)
IMM GRANULOCYTES NFR BLD AUTO: 0.9 % (ref 0–0.5)
LYMPHOCYTES # BLD AUTO: 1.6 K/UL (ref 1–4.8)
LYMPHOCYTES NFR BLD: 11 % (ref 18–48)
MAGNESIUM SERPL-MCNC: 1.6 MG/DL (ref 1.6–2.6)
MCH RBC QN AUTO: 28.4 PG (ref 27–31)
MCHC RBC AUTO-ENTMCNC: 30.7 G/DL (ref 32–36)
MCV RBC AUTO: 92 FL (ref 82–98)
MONOCYTES # BLD AUTO: 1.7 K/UL (ref 0.3–1)
MONOCYTES NFR BLD: 11.6 % (ref 4–15)
NEUTROPHILS # BLD AUTO: 11 K/UL (ref 1.8–7.7)
NEUTROPHILS NFR BLD: 73.4 % (ref 38–73)
NRBC BLD-RTO: 0 /100 WBC
PHOSPHATE SERPL-MCNC: 3 MG/DL (ref 2.7–4.5)
PLATELET # BLD AUTO: 199 K/UL (ref 150–350)
PLATELET BLD QL SMEAR: ABNORMAL
PMV BLD AUTO: 11.9 FL (ref 9.2–12.9)
POCT GLUCOSE: 106 MG/DL (ref 70–110)
POCT GLUCOSE: 120 MG/DL (ref 70–110)
POLYCHROMASIA BLD QL SMEAR: ABNORMAL
POTASSIUM SERPL-SCNC: 3.8 MMOL/L (ref 3.5–5.1)
PROT SERPL-MCNC: 5.7 G/DL (ref 6–8.4)
RBC # BLD AUTO: 2.61 M/UL (ref 4–5.4)
SODIUM SERPL-SCNC: 142 MMOL/L (ref 136–145)
TACROLIMUS BLD-MCNC: 10.5 NG/ML (ref 5–15)
TRIGL SERPL-MCNC: 90 MG/DL (ref 30–150)
WBC # BLD AUTO: 14.97 K/UL (ref 3.9–12.7)

## 2020-10-10 PROCEDURE — 63600175 PHARM REV CODE 636 W HCPCS: Performed by: SURGERY

## 2020-10-10 PROCEDURE — 25000003 PHARM REV CODE 250: Performed by: STUDENT IN AN ORGANIZED HEALTH CARE EDUCATION/TRAINING PROGRAM

## 2020-10-10 PROCEDURE — B4185 PARENTERAL SOL 10 GM LIPIDS: HCPCS | Performed by: STUDENT IN AN ORGANIZED HEALTH CARE EDUCATION/TRAINING PROGRAM

## 2020-10-10 PROCEDURE — 80197 ASSAY OF TACROLIMUS: CPT

## 2020-10-10 PROCEDURE — 80053 COMPREHEN METABOLIC PANEL: CPT

## 2020-10-10 PROCEDURE — 85025 COMPLETE CBC W/AUTO DIFF WBC: CPT

## 2020-10-10 PROCEDURE — 83735 ASSAY OF MAGNESIUM: CPT

## 2020-10-10 PROCEDURE — A4216 STERILE WATER/SALINE, 10 ML: HCPCS | Performed by: SURGERY

## 2020-10-10 PROCEDURE — 63600175 PHARM REV CODE 636 W HCPCS: Performed by: STUDENT IN AN ORGANIZED HEALTH CARE EDUCATION/TRAINING PROGRAM

## 2020-10-10 PROCEDURE — 84478 ASSAY OF TRIGLYCERIDES: CPT

## 2020-10-10 PROCEDURE — 25000003 PHARM REV CODE 250: Performed by: SURGERY

## 2020-10-10 PROCEDURE — C9113 INJ PANTOPRAZOLE SODIUM, VIA: HCPCS | Performed by: STUDENT IN AN ORGANIZED HEALTH CARE EDUCATION/TRAINING PROGRAM

## 2020-10-10 PROCEDURE — 84100 ASSAY OF PHOSPHORUS: CPT

## 2020-10-10 PROCEDURE — A4217 STERILE WATER/SALINE, 500 ML: HCPCS | Performed by: STUDENT IN AN ORGANIZED HEALTH CARE EDUCATION/TRAINING PROGRAM

## 2020-10-10 PROCEDURE — 20600001 HC STEP DOWN PRIVATE ROOM

## 2020-10-10 RX ORDER — FUROSEMIDE 10 MG/ML
20 INJECTION INTRAMUSCULAR; INTRAVENOUS ONCE
Status: COMPLETED | OUTPATIENT
Start: 2020-10-10 | End: 2020-10-10

## 2020-10-10 RX ADMIN — SMOFLIPID 250 ML: 6; 6; 5; 3 INJECTION, EMULSION INTRAVENOUS at 09:10

## 2020-10-10 RX ADMIN — Medication 10 ML: at 12:10

## 2020-10-10 RX ADMIN — Medication 10 ML: at 05:10

## 2020-10-10 RX ADMIN — MEROPENEM AND SODIUM CHLORIDE 1 G: 1 INJECTION, SOLUTION INTRAVENOUS at 12:10

## 2020-10-10 RX ADMIN — OXYCODONE HYDROCHLORIDE 5 MG: 5 TABLET ORAL at 02:10

## 2020-10-10 RX ADMIN — MEROPENEM AND SODIUM CHLORIDE 1 G: 1 INJECTION, SOLUTION INTRAVENOUS at 09:10

## 2020-10-10 RX ADMIN — MAGNESIUM SULFATE HEPTAHYDRATE: 500 INJECTION, SOLUTION INTRAMUSCULAR; INTRAVENOUS at 09:10

## 2020-10-10 RX ADMIN — FLUCONAZOLE 400 MG: 2 INJECTION, SOLUTION INTRAVENOUS at 05:10

## 2020-10-10 RX ADMIN — FUROSEMIDE 20 MG: 10 INJECTION, SOLUTION INTRAMUSCULAR; INTRAVENOUS at 08:10

## 2020-10-10 RX ADMIN — LEVOTHYROXINE SODIUM ANHYDROUS 40 MCG: 100 INJECTION, POWDER, LYOPHILIZED, FOR SOLUTION INTRAVENOUS at 08:10

## 2020-10-10 RX ADMIN — IRON SUCROSE 200 MG: 20 INJECTION, SOLUTION INTRAVENOUS at 08:10

## 2020-10-10 RX ADMIN — TACROLIMUS 1 MG: 1 CAPSULE ORAL at 08:10

## 2020-10-10 RX ADMIN — OXYCODONE HYDROCHLORIDE 5 MG: 5 TABLET ORAL at 05:10

## 2020-10-10 RX ADMIN — HEPARIN SODIUM 5000 UNITS: 5000 INJECTION INTRAVENOUS; SUBCUTANEOUS at 09:10

## 2020-10-10 RX ADMIN — PANTOPRAZOLE SODIUM 40 MG: 40 INJECTION, POWDER, LYOPHILIZED, FOR SOLUTION INTRAVENOUS at 08:10

## 2020-10-10 RX ADMIN — HEPARIN SODIUM 5000 UNITS: 5000 INJECTION INTRAVENOUS; SUBCUTANEOUS at 05:10

## 2020-10-10 RX ADMIN — MUPIROCIN: 20 OINTMENT TOPICAL at 08:10

## 2020-10-10 RX ADMIN — HEPARIN SODIUM 5000 UNITS: 5000 INJECTION INTRAVENOUS; SUBCUTANEOUS at 02:10

## 2020-10-10 RX ADMIN — Medication 10 ML: at 08:10

## 2020-10-10 RX ADMIN — MEROPENEM AND SODIUM CHLORIDE 1 G: 1 INJECTION, SOLUTION INTRAVENOUS at 04:10

## 2020-10-10 RX ADMIN — ONDANSETRON 4 MG: 2 INJECTION INTRAMUSCULAR; INTRAVENOUS at 12:10

## 2020-10-10 NOTE — PLAN OF CARE
Patient was denied [facility] admit 9/7/2020 with Medical Director siting patient was not ready to discharge from hospital care due to Rapid Response call for respiratory rate and patient not tolerating diet.   Patient with improved respiratory status and is on TPN with slow advancement of diet noted.  [facility] filed expedited appeal, did not receive answer today.  Patient is expected to discharge to LTAC upon insurance approval.  Will continue to follow for needs.       10/09/20 2032   Discharge Reassessment   Assessment Type Discharge Planning Reassessment   Discharge plan remains the same: Yes   Provided patient/caregiver education on the expected discharge date and the discharge plan Yes   Discharge Plan A Long-term acute care facility (LTAC)   Discharge Plan B Long-term acute care facility (LTAC)   Anticipated Discharge Disposition LTAC   Post-Acute Status   Post-Acute Authorization Placement   Post-Acute Placement Status Pending Payor Medical Review

## 2020-10-10 NOTE — PROGRESS NOTES
"[facility]  General Surgery  Progress Note    Subjective:     History of Present Illness:  Ms. Hernandez is a 51yo F w/PMH of von Gierke disease s/p liver transplant (2002, on tacro + MMF, follows Dr. Nielsen), hx chronic rejection (bx 2015 with acute and chronic rejection s/p steroids), biliary stricture and ductopenic rejection, recently with cirrhosis on fibroscan (10/2019), HTN, and depression who presents as a transfer for further evaluation of gastric outlet obstruction and esophageal stricturing.  Patient decompensated requiring multiple pressors and intubation. CT scan showed free air. Prior to intubation patient reported severe abdominal pain.   states that patient has had epigastric pain, nausea, and vomiting for several days.     Post-Op Info:  Procedure(s) (LRB):  LAPAROTOMY, EXPLORATORY, DRAINAGE OF ABSCESS, REPAIR OF GASTRIC PERFORATION, GASTROSTOMY TUBE PLACEMENT (N/A)  APPLICATION, WOUND VAC (N/A)   22 Days Post-Op     Interval History:   G tube to gravity due to nausea yesterday, only 250ml output, feeling better now  Still feels "weird" when eating, but not pain.    Medications:  Continuous Infusions:   TPN ADULT CENTRAL LINE CUSTOM 60 mL/hr at 10/09/20 2111    TPN ADULT CENTRAL LINE CUSTOM       Scheduled Meds:   bisacodyL  10 mg Rectal Daily    fluconazole (DIFLUCAN) IVPB  400 mg Intravenous Q24H    heparin (porcine)  5,000 Units Subcutaneous Q8H    iron sucrose (VENOFER) IVPB  200 mg Intravenous Daily    levothyroxine  40 mcg Intravenous Daily    meropenem (MERREM) IVPB  1 g Intravenous Q8H    mupirocin   Nasal BID    pantoprazole  40 mg Intravenous Daily    polyethylene glycol  17 g Oral Daily    sodium chloride 0.9%  10 mL Intravenous Q6H     PRN Meds:sodium chloride, acetaminophen, albuterol-ipratropium, dextrose 50%, dextrose 50%, enalaprilat, glucagon (human recombinant), insulin aspart U-100, iohexol, ondansetron, oxyCODONE, promethazine, sodium " chloride 0.9%, Flushing PICC Protocol **AND** sodium chloride 0.9% **AND** sodium chloride 0.9%     Review of patient's allergies indicates:   Allergen Reactions    Codeine Itching     Other reaction(s): Itching    Lipitor [atorvastatin] Other (See Comments)     Other reaction(s): Muscle pain  Muscle cranmps    Morphine Itching     Other reaction(s): nausea and vomiting     Zoloft [sertraline] Other (See Comments)     Tremors/muscle spasms     Objective:     Vital Signs (Most Recent):  Temp: 98.9 °F (37.2 °C) (10/10/20 0700)  Pulse: 99 (10/10/20 0700)  Resp: 20 (10/10/20 0700)  BP: (!) 142/89 (10/10/20 0700)  SpO2: 98 % (10/10/20 0700) Vital Signs (24h Range):  Temp:  [98.2 °F (36.8 °C)-99.9 °F (37.7 °C)] 98.9 °F (37.2 °C)  Pulse:  [] 99  Resp:  [18-20] 20  SpO2:  [91 %-99 %] 98 %  BP: (125-164)/(65-89) 142/89     Weight: 67.8 kg (149 lb 7.6 oz)  Body mass index is 30.19 kg/m².    Intake/Output - Last 3 Shifts       10/08 0700 - 10/09 0659 10/09 0700 - 10/10 0659 10/10 0700 - 10/11 0659    P.O. 50 100     Blood  212     NG/GT  0     IV Piggyback 200 100     TPN 1416 296     Total Intake(mL/kg) 1666 (24.6) 708 (10.4)     Urine (mL/kg/hr) 1700 (1) 250 (0.2)     Emesis/NG output  0     Drains 500 250     Other  0     Stool 0 0     Total Output 2200 500     Net -534 +208            Urine Occurrence 1 x 7 x     Stool Occurrence 0 x 2 x     Emesis Occurrence  0 x           Physical Exam  Constitutional:       General: She is not in acute distress.  Cardiovascular:      Rate and Rhythm: Normal rate.   Pulmonary:      Effort: Pulmonary effort is normal. No respiratory distress.   Abdominal:      General: There is no distension.      Palpations: Abdomen is soft.      Tenderness: There is no abdominal tenderness.      Comments: G tube to gravity   Neurological:      Mental Status: She is alert.   Psychiatric:         Mood and Affect: Mood normal.         Behavior: Behavior normal.         Significant Labs:  CBC:    Recent Labs   Lab 10/10/20  0315   WBC 14.97*   RBC 2.61*   HGB 7.4*   HCT 24.1*      MCV 92   MCH 28.4   MCHC 30.7*     BMP:   Recent Labs   Lab 10/10/20  0315         K 3.8      CO2 28   BUN 32*   CREATININE 0.6   CALCIUM 8.4*   MG 1.6       Significant Diagnostics:  I have reviewed all pertinent imaging results/findings within the past 24 hours.    Assessment/Plan:     * Perforated abdominal viscus  50 y.o. female w/ free air on CT scan. S/p emergent ex lap on 9/4 w/ findings of gastric perforation, primary repair. Now w/  abdominal closure on 9/6, skin stapled closed, KERRY drains removed. Class A to OR on 9/18 for free air and extrav or oral contrast seen on CT. CT on 9/25 redemonstrated contrast leaking from stomach, but well-drained with surgical drain.    CLD/TPN  Clamp G tube  Lasix this AM, if good response, will plan for second dose this afternoon  If continued upward trend of leukocytosis, will rescan  Iron supplementation  Wound vac discontinued. Continue local wound care  PT/OT    Dispo: Discharge planning, recommending SNF         Cookie Juarez MD  General Surgery  [facility]

## 2020-10-10 NOTE — SUBJECTIVE & OBJECTIVE
"Interval History:   G tube to gravity due to nausea yesterday, only 250ml output, feeling better now  Still feels "weird" when eating, but not pain.    Medications:  Continuous Infusions:   TPN ADULT CENTRAL LINE CUSTOM 60 mL/hr at 10/09/20 2111    TPN ADULT CENTRAL LINE CUSTOM       Scheduled Meds:   bisacodyL  10 mg Rectal Daily    fluconazole (DIFLUCAN) IVPB  400 mg Intravenous Q24H    heparin (porcine)  5,000 Units Subcutaneous Q8H    iron sucrose (VENOFER) IVPB  200 mg Intravenous Daily    levothyroxine  40 mcg Intravenous Daily    meropenem (MERREM) IVPB  1 g Intravenous Q8H    mupirocin   Nasal BID    pantoprazole  40 mg Intravenous Daily    polyethylene glycol  17 g Oral Daily    sodium chloride 0.9%  10 mL Intravenous Q6H     PRN Meds:sodium chloride, acetaminophen, albuterol-ipratropium, dextrose 50%, dextrose 50%, enalaprilat, glucagon (human recombinant), insulin aspart U-100, iohexol, ondansetron, oxyCODONE, promethazine, sodium chloride 0.9%, Flushing PICC Protocol **AND** sodium chloride 0.9% **AND** sodium chloride 0.9%     Review of patient's allergies indicates:   Allergen Reactions    Codeine Itching     Other reaction(s): Itching    Lipitor [atorvastatin] Other (See Comments)     Other reaction(s): Muscle pain  Muscle cranmps    Morphine Itching     Other reaction(s): nausea and vomiting     Zoloft [sertraline] Other (See Comments)     Tremors/muscle spasms     Objective:     Vital Signs (Most Recent):  Temp: 98.9 °F (37.2 °C) (10/10/20 0700)  Pulse: 99 (10/10/20 0700)  Resp: 20 (10/10/20 0700)  BP: (!) 142/89 (10/10/20 0700)  SpO2: 98 % (10/10/20 0700) Vital Signs (24h Range):  Temp:  [98.2 °F (36.8 °C)-99.9 °F (37.7 °C)] 98.9 °F (37.2 °C)  Pulse:  [] 99  Resp:  [18-20] 20  SpO2:  [91 %-99 %] 98 %  BP: (125-164)/(65-89) 142/89     Weight: 67.8 kg (149 lb 7.6 oz)  Body mass index is 30.19 kg/m².    Intake/Output - Last 3 Shifts       10/08 0700 - 10/09 0659 10/09 0700 - " 10/10 0659 10/10 0700 - 10/11 0659    P.O. 50 100     Blood  212     NG/GT  0     IV Piggyback 200 100     TPN 1416 296     Total Intake(mL/kg) 1666 (24.6) 708 (10.4)     Urine (mL/kg/hr) 1700 (1) 250 (0.2)     Emesis/NG output  0     Drains 500 250     Other  0     Stool 0 0     Total Output 2200 500     Net -534 +208            Urine Occurrence 1 x 7 x     Stool Occurrence 0 x 2 x     Emesis Occurrence  0 x           Physical Exam  Constitutional:       General: She is not in acute distress.  Cardiovascular:      Rate and Rhythm: Normal rate.   Pulmonary:      Effort: Pulmonary effort is normal. No respiratory distress.   Abdominal:      General: There is no distension.      Palpations: Abdomen is soft.      Tenderness: There is no abdominal tenderness.      Comments: G tube to gravity   Neurological:      Mental Status: She is alert.   Psychiatric:         Mood and Affect: Mood normal.         Behavior: Behavior normal.         Significant Labs:  CBC:   Recent Labs   Lab 10/10/20  0315   WBC 14.97*   RBC 2.61*   HGB 7.4*   HCT 24.1*      MCV 92   MCH 28.4   MCHC 30.7*     BMP:   Recent Labs   Lab 10/10/20  0315         K 3.8      CO2 28   BUN 32*   CREATININE 0.6   CALCIUM 8.4*   MG 1.6       Significant Diagnostics:  I have reviewed all pertinent imaging results/findings within the past 24 hours.

## 2020-10-10 NOTE — ASSESSMENT & PLAN NOTE
50 y.o. female w/ free air on CT scan. S/p emergent ex lap on 9/4 w/ findings of gastric perforation, primary repair. Now w/  abdominal closure on 9/6, skin stapled closed, KERRY drains removed. Class A to OR on 9/18 for free air and extrav or oral contrast seen on CT. CT on 9/25 redemonstrated contrast leaking from stomach, but well-drained with surgical drain.    CLD/TPN  Clamp G tube  Lasix this AM, if good response, will plan for second dose this afternoon  If continued upward trend of leukocytosis, will rescan  Iron supplementation  Wound vac discontinued. Continue local wound care  PT/OT    Dispo: Discharge planning, recommending SNF

## 2020-10-10 NOTE — TREATMENT PLAN
Hepatology Treatment Plan    Elda Hernandez is a 50 y.o. female admitted to hospital 9/3/2020 (Hospital Day: 38) due to Perforated abdominal viscus. Hepatology following for immunosuppression management.    Lab Results   Component Value Date    TACROLIMUS 10.5 10/10/2020    TACROLIMUS 6.7 10/09/2020    TACROLIMUS 6.0 10/08/2020       Lab Results   Component Value Date    CREATININE 0.6 10/10/2020    CREATININE 0.6 10/09/2020    CREATININE 0.6 10/08/2020       Lab Results   Component Value Date    ALT 58 (H) 10/10/2020    AST 84 (H) 10/10/2020     (H) 04/07/2018    ALKPHOS 547 (H) 10/10/2020    BILITOT 4.2 (H) 10/10/2020    WBC 14.97 (H) 10/10/2020    HGB 7.4 (L) 10/10/2020     10/10/2020       Kidney function is stable  Liver enzymes are improving    Plan  - Changes to immunosuppression regimen as below  Holding immunosuppression due to high tacrolimus level.     Please notify hepatology on day of discharge to discuss discharge medications and follow up.     Please obtain daily CBC, BMP, LFT, INR, immunosuppressant trough level    Thank you for involving us in the care of Elda Hernandez. Please call with any additional concerns or questions.    Fatuma Mathews MD  Gastroenterology Fellow

## 2020-10-10 NOTE — PLAN OF CARE
Patient g-tube remains clamped and experienced some nausea today.  Full relief with IV zofran.  Vitals remained stable throughout shift.  Titrated oxygen down to 2L nasal cannula and patient tolerated it well.  Will continue to monitor.

## 2020-10-10 NOTE — NURSING
POC reviewed with pt. VSS on 4LNC. AAOX4 - intermittent confusion noted - easily reoriented. Remains free of falls and injury.     ML with staples dry and intact with foam dressing. Old drain sites dry and intact with no drainage noted with exception of LUQ to ostomy bag - no new drainage to bag. G tube to gravity putting out green liquid to bag. R UA PICC dry and intact infusing TPN at 60mL/hr.     Tolerating regular diet, denies nausea. Pain controlled with PRN medications per MAR. Telemetry being monitored running NSR with cont PO maintained - sats 96%. Blood glucose being monitored every 6 hours with no coverage needed. Encouraged IS use. Heel offloading boots in place. Frequent weight shift assistance provided. Urinating adequately to sonu pads.     No acute events. No distress noted. Bed in lowest position, call light within reach, frequent rounds made for safety.

## 2020-10-11 LAB
ALBUMIN SERPL BCP-MCNC: 1.4 G/DL (ref 3.5–5.2)
ALP SERPL-CCNC: 491 U/L (ref 55–135)
ALT SERPL W/O P-5'-P-CCNC: 51 U/L (ref 10–44)
ANION GAP SERPL CALC-SCNC: 6 MMOL/L (ref 8–16)
AST SERPL-CCNC: 71 U/L (ref 10–40)
BASOPHILS # BLD AUTO: 0.04 K/UL (ref 0–0.2)
BASOPHILS NFR BLD: 0.3 % (ref 0–1.9)
BILIRUB SERPL-MCNC: 3.9 MG/DL (ref 0.1–1)
BUN SERPL-MCNC: 31 MG/DL (ref 6–20)
CALCIUM SERPL-MCNC: 8.4 MG/DL (ref 8.7–10.5)
CHLORIDE SERPL-SCNC: 108 MMOL/L (ref 95–110)
CO2 SERPL-SCNC: 30 MMOL/L (ref 23–29)
CREAT SERPL-MCNC: 0.6 MG/DL (ref 0.5–1.4)
DIFFERENTIAL METHOD: ABNORMAL
EOSINOPHIL # BLD AUTO: 0.5 K/UL (ref 0–0.5)
EOSINOPHIL NFR BLD: 3.9 % (ref 0–8)
ERYTHROCYTE [DISTWIDTH] IN BLOOD BY AUTOMATED COUNT: 17.5 % (ref 11.5–14.5)
EST. GFR  (AFRICAN AMERICAN): >60 ML/MIN/1.73 M^2
EST. GFR  (NON AFRICAN AMERICAN): >60 ML/MIN/1.73 M^2
GLUCOSE SERPL-MCNC: 105 MG/DL (ref 70–110)
HCT VFR BLD AUTO: 23.2 % (ref 37–48.5)
HGB BLD-MCNC: 7.1 G/DL (ref 12–16)
IMM GRANULOCYTES # BLD AUTO: 0.15 K/UL (ref 0–0.04)
IMM GRANULOCYTES NFR BLD AUTO: 1.3 % (ref 0–0.5)
LYMPHOCYTES # BLD AUTO: 1.1 K/UL (ref 1–4.8)
LYMPHOCYTES NFR BLD: 9.3 % (ref 18–48)
MAGNESIUM SERPL-MCNC: 1.5 MG/DL (ref 1.6–2.6)
MCH RBC QN AUTO: 28.3 PG (ref 27–31)
MCHC RBC AUTO-ENTMCNC: 30.6 G/DL (ref 32–36)
MCV RBC AUTO: 92 FL (ref 82–98)
MONOCYTES # BLD AUTO: 1.3 K/UL (ref 0.3–1)
MONOCYTES NFR BLD: 11 % (ref 4–15)
NEUTROPHILS # BLD AUTO: 8.6 K/UL (ref 1.8–7.7)
NEUTROPHILS NFR BLD: 74.2 % (ref 38–73)
NRBC BLD-RTO: 0 /100 WBC
PHOSPHATE SERPL-MCNC: 2.8 MG/DL (ref 2.7–4.5)
PLATELET # BLD AUTO: 169 K/UL (ref 150–350)
PMV BLD AUTO: 12.8 FL (ref 9.2–12.9)
POCT GLUCOSE: 100 MG/DL (ref 70–110)
POCT GLUCOSE: 106 MG/DL (ref 70–110)
POCT GLUCOSE: 109 MG/DL (ref 70–110)
POCT GLUCOSE: 119 MG/DL (ref 70–110)
POTASSIUM SERPL-SCNC: 3.7 MMOL/L (ref 3.5–5.1)
PROT SERPL-MCNC: 6 G/DL (ref 6–8.4)
RBC # BLD AUTO: 2.51 M/UL (ref 4–5.4)
SODIUM SERPL-SCNC: 144 MMOL/L (ref 136–145)
TACROLIMUS BLD-MCNC: 9.7 NG/ML (ref 5–15)
WBC # BLD AUTO: 11.61 K/UL (ref 3.9–12.7)

## 2020-10-11 PROCEDURE — 25000003 PHARM REV CODE 250: Performed by: STUDENT IN AN ORGANIZED HEALTH CARE EDUCATION/TRAINING PROGRAM

## 2020-10-11 PROCEDURE — 63600175 PHARM REV CODE 636 W HCPCS: Performed by: STUDENT IN AN ORGANIZED HEALTH CARE EDUCATION/TRAINING PROGRAM

## 2020-10-11 PROCEDURE — 84100 ASSAY OF PHOSPHORUS: CPT

## 2020-10-11 PROCEDURE — 63600175 PHARM REV CODE 636 W HCPCS: Performed by: SURGERY

## 2020-10-11 PROCEDURE — A4217 STERILE WATER/SALINE, 500 ML: HCPCS | Performed by: STUDENT IN AN ORGANIZED HEALTH CARE EDUCATION/TRAINING PROGRAM

## 2020-10-11 PROCEDURE — 20600001 HC STEP DOWN PRIVATE ROOM

## 2020-10-11 PROCEDURE — 80197 ASSAY OF TACROLIMUS: CPT

## 2020-10-11 PROCEDURE — C9113 INJ PANTOPRAZOLE SODIUM, VIA: HCPCS | Performed by: STUDENT IN AN ORGANIZED HEALTH CARE EDUCATION/TRAINING PROGRAM

## 2020-10-11 PROCEDURE — 25000003 PHARM REV CODE 250: Performed by: SURGERY

## 2020-10-11 PROCEDURE — A4216 STERILE WATER/SALINE, 10 ML: HCPCS | Performed by: SURGERY

## 2020-10-11 PROCEDURE — 85025 COMPLETE CBC W/AUTO DIFF WBC: CPT

## 2020-10-11 PROCEDURE — 83735 ASSAY OF MAGNESIUM: CPT

## 2020-10-11 PROCEDURE — 80053 COMPREHEN METABOLIC PANEL: CPT

## 2020-10-11 PROCEDURE — B4185 PARENTERAL SOL 10 GM LIPIDS: HCPCS | Performed by: STUDENT IN AN ORGANIZED HEALTH CARE EDUCATION/TRAINING PROGRAM

## 2020-10-11 RX ORDER — METOCLOPRAMIDE HYDROCHLORIDE 5 MG/ML
10 INJECTION INTRAMUSCULAR; INTRAVENOUS EVERY 8 HOURS
Status: DISCONTINUED | OUTPATIENT
Start: 2020-10-11 | End: 2020-10-14 | Stop reason: HOSPADM

## 2020-10-11 RX ORDER — FUROSEMIDE 10 MG/ML
40 INJECTION INTRAMUSCULAR; INTRAVENOUS 2 TIMES DAILY
Status: DISCONTINUED | OUTPATIENT
Start: 2020-10-11 | End: 2020-10-11

## 2020-10-11 RX ORDER — FUROSEMIDE 10 MG/ML
20 INJECTION INTRAMUSCULAR; INTRAVENOUS 2 TIMES DAILY
Status: DISCONTINUED | OUTPATIENT
Start: 2020-10-11 | End: 2020-10-11

## 2020-10-11 RX ORDER — FUROSEMIDE 10 MG/ML
40 INJECTION INTRAMUSCULAR; INTRAVENOUS 2 TIMES DAILY
Status: COMPLETED | OUTPATIENT
Start: 2020-10-11 | End: 2020-10-11

## 2020-10-11 RX ADMIN — OXYCODONE HYDROCHLORIDE 5 MG: 5 TABLET ORAL at 05:10

## 2020-10-11 RX ADMIN — FLUCONAZOLE 400 MG: 2 INJECTION, SOLUTION INTRAVENOUS at 05:10

## 2020-10-11 RX ADMIN — POLYETHYLENE GLYCOL 3350 17 G: 17 POWDER, FOR SOLUTION ORAL at 10:10

## 2020-10-11 RX ADMIN — HEPARIN SODIUM 5000 UNITS: 5000 INJECTION INTRAVENOUS; SUBCUTANEOUS at 11:10

## 2020-10-11 RX ADMIN — FUROSEMIDE 40 MG: 10 INJECTION, SOLUTION INTRAMUSCULAR; INTRAVENOUS at 10:10

## 2020-10-11 RX ADMIN — MEROPENEM AND SODIUM CHLORIDE 1 G: 1 INJECTION, SOLUTION INTRAVENOUS at 08:10

## 2020-10-11 RX ADMIN — HEPARIN SODIUM 5000 UNITS: 5000 INJECTION INTRAVENOUS; SUBCUTANEOUS at 05:10

## 2020-10-11 RX ADMIN — OXYCODONE HYDROCHLORIDE 5 MG: 5 TABLET ORAL at 09:10

## 2020-10-11 RX ADMIN — HEPARIN SODIUM 5000 UNITS: 5000 INJECTION INTRAVENOUS; SUBCUTANEOUS at 02:10

## 2020-10-11 RX ADMIN — OXYCODONE HYDROCHLORIDE 5 MG: 5 TABLET ORAL at 10:10

## 2020-10-11 RX ADMIN — Medication 10 ML: at 12:10

## 2020-10-11 RX ADMIN — BISACODYL 10 MG: 10 SUPPOSITORY RECTAL at 10:10

## 2020-10-11 RX ADMIN — LEVOTHYROXINE SODIUM ANHYDROUS 40 MCG: 100 INJECTION, POWDER, LYOPHILIZED, FOR SOLUTION INTRAVENOUS at 10:10

## 2020-10-11 RX ADMIN — Medication 10 ML: at 06:10

## 2020-10-11 RX ADMIN — SMOFLIPID 250 ML: 6; 6; 5; 3 INJECTION, EMULSION INTRAVENOUS at 11:10

## 2020-10-11 RX ADMIN — METOCLOPRAMIDE 10 MG: 5 INJECTION, SOLUTION INTRAMUSCULAR; INTRAVENOUS at 11:10

## 2020-10-11 RX ADMIN — PANTOPRAZOLE SODIUM 40 MG: 40 INJECTION, POWDER, LYOPHILIZED, FOR SOLUTION INTRAVENOUS at 10:10

## 2020-10-11 RX ADMIN — IRON SUCROSE 200 MG: 20 INJECTION, SOLUTION INTRAVENOUS at 10:10

## 2020-10-11 RX ADMIN — MEROPENEM AND SODIUM CHLORIDE 1 G: 1 INJECTION, SOLUTION INTRAVENOUS at 12:10

## 2020-10-11 RX ADMIN — MAGNESIUM SULFATE HEPTAHYDRATE: 500 INJECTION, SOLUTION INTRAMUSCULAR; INTRAVENOUS at 11:10

## 2020-10-11 RX ADMIN — Medication 10 ML: at 05:10

## 2020-10-11 RX ADMIN — METOCLOPRAMIDE 10 MG: 5 INJECTION, SOLUTION INTRAMUSCULAR; INTRAVENOUS at 02:10

## 2020-10-11 RX ADMIN — MEROPENEM AND SODIUM CHLORIDE 1 G: 1 INJECTION, SOLUTION INTRAVENOUS at 03:10

## 2020-10-11 NOTE — PLAN OF CARE
POC reviewed with patient, states understanding. AOx4, intermittently confused, easily reoriented. VS WDL on 3L NC. ML incision, dressing on proximal side c/d/i, staples intact on distal side. X 2 punture sites on ABD from KERRY drain sites, dressing c/d/i. G tube remained clamped this shift. Tolerated diet, no complaints of nausea or pain. Will continue to manage POC.

## 2020-10-11 NOTE — TREATMENT PLAN
Hepatology Treatment Plan    Elda Hernandez is a 50 y.o. female admitted to hospital 9/3/2020 (Hospital Day: 39) due to Perforated abdominal viscus. Hepatology following for immunosuppression management.    Lab Results   Component Value Date    TACROLIMUS 9.7 10/11/2020    TACROLIMUS 10.5 10/10/2020    TACROLIMUS 6.7 10/09/2020       Lab Results   Component Value Date    CREATININE 0.6 10/11/2020    CREATININE 0.6 10/10/2020    CREATININE 0.6 10/09/2020       Lab Results   Component Value Date    ALT 51 (H) 10/11/2020    AST 71 (H) 10/11/2020     (H) 04/07/2018    ALKPHOS 491 (H) 10/11/2020    BILITOT 3.9 (H) 10/11/2020    WBC 11.61 10/11/2020    HGB 7.1 (L) 10/11/2020     10/11/2020       Kidney function is stable  Liver enzymes are improving    Plan  - Continue to hold prograf    Please notify hepatology on day of discharge to discuss discharge medications and follow up.     Please obtain daily CBC, BMP, LFT, INR, immunosuppressant trough level    Thank you for involving us in the care of Elda Hernandez. Please call with any additional concerns or questions.    Fatuma Mathews MD  Gastroenterology Fellow

## 2020-10-11 NOTE — PROGRESS NOTES
[facility]  General Surgery  Progress Note    Subjective:     History of Present Illness:  Ms. Hernandez is a 49yo F w/PMH of von Gierke disease s/p liver transplant (2002, on tacro + MMF, follows Dr. Nielsen), hx chronic rejection (bx 2015 with acute and chronic rejection s/p steroids), biliary stricture and ductopenic rejection, recently with cirrhosis on fibroscan (10/2019), HTN, and depression who presents as a transfer for further evaluation of gastric outlet obstruction and esophageal stricturing.  Patient decompensated requiring multiple pressors and intubation. CT scan showed free air. Prior to intubation patient reported severe abdominal pain.   states that patient has had epigastric pain, nausea, and vomiting for several days.     Post-Op Info:  Procedure(s) (LRB):  LAPAROTOMY, EXPLORATORY, DRAINAGE OF ABSCESS, REPAIR OF GASTRIC PERFORATION, GASTROSTOMY TUBE PLACEMENT (N/A)  APPLICATION, WOUND VAC (N/A)   23 Days Post-Op     Interval History:   Nausea with liquids and with solid food. G tube was briefly placed to gravity  Hemoglobin with continued slow down trend    Medications:  Continuous Infusions:   TPN ADULT CENTRAL LINE CUSTOM 60 mL/hr at 10/10/20 2111     Scheduled Meds:   bisacodyL  10 mg Rectal Daily    fluconazole (DIFLUCAN) IVPB  400 mg Intravenous Q24H    furosemide (LASIX) IV  40 mg Intravenous BID    heparin (porcine)  5,000 Units Subcutaneous Q8H    iron sucrose (VENOFER) IVPB  200 mg Intravenous Daily    levothyroxine  40 mcg Intravenous Daily    meropenem (MERREM) IVPB  1 g Intravenous Q8H    pantoprazole  40 mg Intravenous Daily    polyethylene glycol  17 g Oral Daily    sodium chloride 0.9%  10 mL Intravenous Q6H     PRN Meds:sodium chloride, acetaminophen, albuterol-ipratropium, dextrose 50%, dextrose 50%, enalaprilat, glucagon (human recombinant), insulin aspart U-100, iohexol, ondansetron, oxyCODONE, promethazine, sodium chloride 0.9%, Flushing PICC  Protocol **AND** sodium chloride 0.9% **AND** sodium chloride 0.9%     Review of patient's allergies indicates:   Allergen Reactions    Codeine Itching     Other reaction(s): Itching    Lipitor [atorvastatin] Other (See Comments)     Other reaction(s): Muscle pain  Muscle cranmps    Morphine Itching     Other reaction(s): nausea and vomiting     Zoloft [sertraline] Other (See Comments)     Tremors/muscle spasms     Objective:     Vital Signs (Most Recent):  Temp: 99.8 °F (37.7 °C) (10/11/20 0412)  Pulse: 106 (10/11/20 0600)  Resp: 19 (10/11/20 0412)  BP: (!) 153/68 (10/11/20 0412)  SpO2: 96 % (10/11/20 0412) Vital Signs (24h Range):  Temp:  [98.1 °F (36.7 °C)-99.8 °F (37.7 °C)] 99.8 °F (37.7 °C)  Pulse:  [] 106  Resp:  [18-19] 19  SpO2:  [92 %-98 %] 96 %  BP: (133-160)/(67-99) 153/68     Weight: 67.8 kg (149 lb 7.6 oz)  Body mass index is 30.19 kg/m².    Intake/Output - Last 3 Shifts       10/09 0700 - 10/10 0659 10/10 0700 - 10/11 0659 10/11 0700 - 10/12 0659    P.O. 100      Blood 212      NG/GT 0      IV Piggyback 100 1000      1775.7     Total Intake(mL/kg) 708 (10.4) 2775.7 (40.9)     Urine (mL/kg/hr) 250 (0.2)      Emesis/NG output 0      Drains 250      Other 0      Stool 0      Total Output 500      Net +208 +2775.7            Urine Occurrence 7 x 2 x     Stool Occurrence 2 x      Emesis Occurrence 0 x            Physical Exam  Constitutional:       General: She is not in acute distress.  Cardiovascular:      Rate and Rhythm: Normal rate.   Pulmonary:      Effort: Pulmonary effort is normal.      Comments: On supplemental oxygen  Abdominal:      General: There is no distension.      Palpations: Abdomen is soft.      Tenderness: There is no abdominal tenderness.      Comments: G tube in place, clamped   Musculoskeletal:      Right lower leg: Edema present.      Left lower leg: Edema present.      Comments: Diffuse edema, worse at dependent locations  Left lower extremity red and swollen    Skin:     General: Skin is warm and dry.   Neurological:      Mental Status: She is alert.         Significant Labs:  CBC:   Recent Labs   Lab 10/11/20  0340   WBC 11.61   RBC 2.51*   HGB 7.1*   HCT 23.2*      MCV 92   MCH 28.3   MCHC 30.6*     CMP:   Recent Labs   Lab 10/11/20  0340      CALCIUM 8.4*   ALBUMIN 1.4*   PROT 6.0      K 3.7   CO2 30*      BUN 31*   CREATININE 0.6   ALKPHOS 491*   ALT 51*   AST 71*   BILITOT 3.9*       Significant Diagnostics:  I have reviewed all pertinent imaging results/findings within the past 24 hours.    Assessment/Plan:     * Perforated abdominal viscus  50 y.o. female w/ free air on CT scan. S/p emergent ex lap on 9/4 w/ findings of gastric perforation, primary repair. Now w/  abdominal closure on 9/6, skin stapled closed, KERRY drains removed. Class A to OR on 9/18 for free air and extrav or oral contrast seen on CT. CT on 9/25 redemonstrated contrast leaking from stomach, but well-drained with surgical drain.    CLD/TPN  History of gastroparesis, per , will start reglan  Clamp G tube  Lasix BID  DVT US of left leg, if needs anticoagulation, will plan to scan prior to initiation to rule out intraabdominal source of bleeding  Iron supplementation  Wound vac discontinued. Continue local wound care  PT/OT    Dispo: Discharge planning, recommending SNF         Cookie Juarez MD  General Surgery  [facility]

## 2020-10-11 NOTE — ASSESSMENT & PLAN NOTE
50 y.o. female w/ free air on CT scan. S/p emergent ex lap on 9/4 w/ findings of gastric perforation, primary repair. Now w/  abdominal closure on 9/6, skin stapled closed, KERRY drains removed. Class A to OR on 9/18 for free air and extrav or oral contrast seen on CT. CT on 9/25 redemonstrated contrast leaking from stomach, but well-drained with surgical drain.    CLD/TPN  History of gastroparesis, per , will start reglan  Clamp G tube  Lasix BID  DVT US of left leg, if needs anticoagulation, will plan to scan prior to initiation to rule out intraabdominal source of bleeding  Iron supplementation  Wound vac discontinued. Continue local wound care  PT/OT    Dispo: Discharge planning, recommending SNF

## 2020-10-11 NOTE — SUBJECTIVE & OBJECTIVE
Interval History:   Nausea with liquids and with solid food. G tube was briefly placed to gravity  Hemoglobin with continued slow down trend    Medications:  Continuous Infusions:   TPN ADULT CENTRAL LINE CUSTOM 60 mL/hr at 10/10/20 2111     Scheduled Meds:   bisacodyL  10 mg Rectal Daily    fluconazole (DIFLUCAN) IVPB  400 mg Intravenous Q24H    furosemide (LASIX) IV  40 mg Intravenous BID    heparin (porcine)  5,000 Units Subcutaneous Q8H    iron sucrose (VENOFER) IVPB  200 mg Intravenous Daily    levothyroxine  40 mcg Intravenous Daily    meropenem (MERREM) IVPB  1 g Intravenous Q8H    pantoprazole  40 mg Intravenous Daily    polyethylene glycol  17 g Oral Daily    sodium chloride 0.9%  10 mL Intravenous Q6H     PRN Meds:sodium chloride, acetaminophen, albuterol-ipratropium, dextrose 50%, dextrose 50%, enalaprilat, glucagon (human recombinant), insulin aspart U-100, iohexol, ondansetron, oxyCODONE, promethazine, sodium chloride 0.9%, Flushing PICC Protocol **AND** sodium chloride 0.9% **AND** sodium chloride 0.9%     Review of patient's allergies indicates:   Allergen Reactions    Codeine Itching     Other reaction(s): Itching    Lipitor [atorvastatin] Other (See Comments)     Other reaction(s): Muscle pain  Muscle cranmps    Morphine Itching     Other reaction(s): nausea and vomiting     Zoloft [sertraline] Other (See Comments)     Tremors/muscle spasms     Objective:     Vital Signs (Most Recent):  Temp: 99.8 °F (37.7 °C) (10/11/20 0412)  Pulse: 106 (10/11/20 0600)  Resp: 19 (10/11/20 0412)  BP: (!) 153/68 (10/11/20 0412)  SpO2: 96 % (10/11/20 0412) Vital Signs (24h Range):  Temp:  [98.1 °F (36.7 °C)-99.8 °F (37.7 °C)] 99.8 °F (37.7 °C)  Pulse:  [] 106  Resp:  [18-19] 19  SpO2:  [92 %-98 %] 96 %  BP: (133-160)/(67-99) 153/68     Weight: 67.8 kg (149 lb 7.6 oz)  Body mass index is 30.19 kg/m².    Intake/Output - Last 3 Shifts       10/09 0700 - 10/10 0659 10/10 0700 - 10/11 0659 10/11 0700 -  10/12 0659    P.O. 100      Blood 212      NG/GT 0      IV Piggyback 100 1000      1775.7     Total Intake(mL/kg) 708 (10.4) 2775.7 (40.9)     Urine (mL/kg/hr) 250 (0.2)      Emesis/NG output 0      Drains 250      Other 0      Stool 0      Total Output 500      Net +208 +2775.7            Urine Occurrence 7 x 2 x     Stool Occurrence 2 x      Emesis Occurrence 0 x            Physical Exam  Constitutional:       General: She is not in acute distress.  Cardiovascular:      Rate and Rhythm: Normal rate.   Pulmonary:      Effort: Pulmonary effort is normal.      Comments: On supplemental oxygen  Abdominal:      General: There is no distension.      Palpations: Abdomen is soft.      Tenderness: There is no abdominal tenderness.      Comments: G tube in place, clamped   Musculoskeletal:      Right lower leg: Edema present.      Left lower leg: Edema present.      Comments: Diffuse edema, worse at dependent locations  Left lower extremity red and swollen   Skin:     General: Skin is warm and dry.   Neurological:      Mental Status: She is alert.         Significant Labs:  CBC:   Recent Labs   Lab 10/11/20  0340   WBC 11.61   RBC 2.51*   HGB 7.1*   HCT 23.2*      MCV 92   MCH 28.3   MCHC 30.6*     CMP:   Recent Labs   Lab 10/11/20  0340      CALCIUM 8.4*   ALBUMIN 1.4*   PROT 6.0      K 3.7   CO2 30*      BUN 31*   CREATININE 0.6   ALKPHOS 491*   ALT 51*   AST 71*   BILITOT 3.9*       Significant Diagnostics:  I have reviewed all pertinent imaging results/findings within the past 24 hours.

## 2020-10-12 LAB
ABO + RH BLD: NORMAL
ALBUMIN SERPL BCP-MCNC: 1.5 G/DL (ref 3.5–5.2)
ALP SERPL-CCNC: 532 U/L (ref 55–135)
ALT SERPL W/O P-5'-P-CCNC: 54 U/L (ref 10–44)
ANION GAP SERPL CALC-SCNC: 8 MMOL/L (ref 8–16)
AST SERPL-CCNC: 75 U/L (ref 10–40)
BASOPHILS # BLD AUTO: 0.04 K/UL (ref 0–0.2)
BASOPHILS # BLD AUTO: 0.07 K/UL (ref 0–0.2)
BASOPHILS NFR BLD: 0.4 % (ref 0–1.9)
BASOPHILS NFR BLD: 0.7 % (ref 0–1.9)
BILIRUB SERPL-MCNC: 3.7 MG/DL (ref 0.1–1)
BLD GP AB SCN CELLS X3 SERPL QL: NORMAL
BLD PROD TYP BPU: NORMAL
BLOOD UNIT EXPIRATION DATE: NORMAL
BLOOD UNIT TYPE CODE: 7300
BLOOD UNIT TYPE: NORMAL
BUN SERPL-MCNC: 29 MG/DL (ref 6–20)
CALCIUM SERPL-MCNC: 8.5 MG/DL (ref 8.7–10.5)
CHLORIDE SERPL-SCNC: 107 MMOL/L (ref 95–110)
CO2 SERPL-SCNC: 30 MMOL/L (ref 23–29)
CODING SYSTEM: NORMAL
CREAT SERPL-MCNC: 0.6 MG/DL (ref 0.5–1.4)
DIFFERENTIAL METHOD: ABNORMAL
DIFFERENTIAL METHOD: ABNORMAL
DISPENSE STATUS: NORMAL
EOSINOPHIL # BLD AUTO: 0.5 K/UL (ref 0–0.5)
EOSINOPHIL # BLD AUTO: 0.6 K/UL (ref 0–0.5)
EOSINOPHIL NFR BLD: 4.8 % (ref 0–8)
EOSINOPHIL NFR BLD: 5.8 % (ref 0–8)
ERYTHROCYTE [DISTWIDTH] IN BLOOD BY AUTOMATED COUNT: 18.1 % (ref 11.5–14.5)
ERYTHROCYTE [DISTWIDTH] IN BLOOD BY AUTOMATED COUNT: 18.5 % (ref 11.5–14.5)
EST. GFR  (AFRICAN AMERICAN): >60 ML/MIN/1.73 M^2
EST. GFR  (NON AFRICAN AMERICAN): >60 ML/MIN/1.73 M^2
GLUCOSE SERPL-MCNC: 106 MG/DL (ref 70–110)
HCT VFR BLD AUTO: 23.6 % (ref 37–48.5)
HCT VFR BLD AUTO: 28.6 % (ref 37–48.5)
HGB BLD-MCNC: 7 G/DL (ref 12–16)
HGB BLD-MCNC: 8.7 G/DL (ref 12–16)
IMM GRANULOCYTES # BLD AUTO: 0.09 K/UL (ref 0–0.04)
IMM GRANULOCYTES # BLD AUTO: 0.12 K/UL (ref 0–0.04)
IMM GRANULOCYTES NFR BLD AUTO: 0.9 % (ref 0–0.5)
IMM GRANULOCYTES NFR BLD AUTO: 1.2 % (ref 0–0.5)
LYMPHOCYTES # BLD AUTO: 1 K/UL (ref 1–4.8)
LYMPHOCYTES # BLD AUTO: 1.3 K/UL (ref 1–4.8)
LYMPHOCYTES NFR BLD: 10.2 % (ref 18–48)
LYMPHOCYTES NFR BLD: 13.2 % (ref 18–48)
MAGNESIUM SERPL-MCNC: 1.4 MG/DL (ref 1.6–2.6)
MCH RBC QN AUTO: 28.2 PG (ref 27–31)
MCH RBC QN AUTO: 28.2 PG (ref 27–31)
MCHC RBC AUTO-ENTMCNC: 29.7 G/DL (ref 32–36)
MCHC RBC AUTO-ENTMCNC: 30.4 G/DL (ref 32–36)
MCV RBC AUTO: 93 FL (ref 82–98)
MCV RBC AUTO: 95 FL (ref 82–98)
MONOCYTES # BLD AUTO: 1.2 K/UL (ref 0.3–1)
MONOCYTES # BLD AUTO: 1.4 K/UL (ref 0.3–1)
MONOCYTES NFR BLD: 12.9 % (ref 4–15)
MONOCYTES NFR BLD: 14.3 % (ref 4–15)
NEUTROPHILS # BLD AUTO: 6.3 K/UL (ref 1.8–7.7)
NEUTROPHILS # BLD AUTO: 6.7 K/UL (ref 1.8–7.7)
NEUTROPHILS NFR BLD: 64.8 % (ref 38–73)
NEUTROPHILS NFR BLD: 70.8 % (ref 38–73)
NRBC BLD-RTO: 0 /100 WBC
NRBC BLD-RTO: 0 /100 WBC
NUM UNITS TRANS PACKED RBC: NORMAL
PHOSPHATE SERPL-MCNC: 3 MG/DL (ref 2.7–4.5)
PLATELET # BLD AUTO: 167 K/UL (ref 150–350)
PLATELET # BLD AUTO: 185 K/UL (ref 150–350)
PMV BLD AUTO: 12.7 FL (ref 9.2–12.9)
PMV BLD AUTO: 13.2 FL (ref 9.2–12.9)
POCT GLUCOSE: 105 MG/DL (ref 70–110)
POCT GLUCOSE: 109 MG/DL (ref 70–110)
POCT GLUCOSE: 129 MG/DL (ref 70–110)
POCT GLUCOSE: 89 MG/DL (ref 70–110)
POTASSIUM SERPL-SCNC: 3.5 MMOL/L (ref 3.5–5.1)
PROT SERPL-MCNC: 6.1 G/DL (ref 6–8.4)
RBC # BLD AUTO: 2.48 M/UL (ref 4–5.4)
RBC # BLD AUTO: 3.09 M/UL (ref 4–5.4)
SODIUM SERPL-SCNC: 145 MMOL/L (ref 136–145)
TACROLIMUS BLD-MCNC: 6.6 NG/ML (ref 5–15)
WBC # BLD AUTO: 9.51 K/UL (ref 3.9–12.7)
WBC # BLD AUTO: 9.71 K/UL (ref 3.9–12.7)

## 2020-10-12 PROCEDURE — 83735 ASSAY OF MAGNESIUM: CPT

## 2020-10-12 PROCEDURE — 97530 THERAPEUTIC ACTIVITIES: CPT

## 2020-10-12 PROCEDURE — 80197 ASSAY OF TACROLIMUS: CPT

## 2020-10-12 PROCEDURE — P9016 RBC LEUKOCYTES REDUCED: HCPCS

## 2020-10-12 PROCEDURE — 27000221 HC OXYGEN, UP TO 24 HOURS

## 2020-10-12 PROCEDURE — 36430 TRANSFUSION BLD/BLD COMPNT: CPT

## 2020-10-12 PROCEDURE — 94664 DEMO&/EVAL PT USE INHALER: CPT

## 2020-10-12 PROCEDURE — 63600175 PHARM REV CODE 636 W HCPCS: Performed by: SURGERY

## 2020-10-12 PROCEDURE — 63600175 PHARM REV CODE 636 W HCPCS: Performed by: STUDENT IN AN ORGANIZED HEALTH CARE EDUCATION/TRAINING PROGRAM

## 2020-10-12 PROCEDURE — 97535 SELF CARE MNGMENT TRAINING: CPT

## 2020-10-12 PROCEDURE — 85025 COMPLETE CBC W/AUTO DIFF WBC: CPT

## 2020-10-12 PROCEDURE — 94761 N-INVAS EAR/PLS OXIMETRY MLT: CPT

## 2020-10-12 PROCEDURE — 80053 COMPREHEN METABOLIC PANEL: CPT

## 2020-10-12 PROCEDURE — 84100 ASSAY OF PHOSPHORUS: CPT

## 2020-10-12 PROCEDURE — 86901 BLOOD TYPING SEROLOGIC RH(D): CPT

## 2020-10-12 PROCEDURE — C9113 INJ PANTOPRAZOLE SODIUM, VIA: HCPCS | Performed by: STUDENT IN AN ORGANIZED HEALTH CARE EDUCATION/TRAINING PROGRAM

## 2020-10-12 PROCEDURE — 25000003 PHARM REV CODE 250: Performed by: STUDENT IN AN ORGANIZED HEALTH CARE EDUCATION/TRAINING PROGRAM

## 2020-10-12 PROCEDURE — B4185 PARENTERAL SOL 10 GM LIPIDS: HCPCS | Performed by: STUDENT IN AN ORGANIZED HEALTH CARE EDUCATION/TRAINING PROGRAM

## 2020-10-12 PROCEDURE — 94799 UNLISTED PULMONARY SVC/PX: CPT

## 2020-10-12 PROCEDURE — A4216 STERILE WATER/SALINE, 10 ML: HCPCS | Performed by: SURGERY

## 2020-10-12 PROCEDURE — 20600001 HC STEP DOWN PRIVATE ROOM

## 2020-10-12 PROCEDURE — 99900035 HC TECH TIME PER 15 MIN (STAT)

## 2020-10-12 PROCEDURE — A4217 STERILE WATER/SALINE, 500 ML: HCPCS | Performed by: SURGERY

## 2020-10-12 PROCEDURE — 25000003 PHARM REV CODE 250: Performed by: SURGERY

## 2020-10-12 PROCEDURE — 27000646 HC AEROBIKA DEVICE

## 2020-10-12 RX ADMIN — MAGNESIUM SULFATE HEPTAHYDRATE: 500 INJECTION, SOLUTION INTRAMUSCULAR; INTRAVENOUS at 09:10

## 2020-10-12 RX ADMIN — HEPARIN SODIUM 5000 UNITS: 5000 INJECTION INTRAVENOUS; SUBCUTANEOUS at 03:10

## 2020-10-12 RX ADMIN — MEROPENEM AND SODIUM CHLORIDE 1 G: 1 INJECTION, SOLUTION INTRAVENOUS at 03:10

## 2020-10-12 RX ADMIN — HEPARIN SODIUM 5000 UNITS: 5000 INJECTION INTRAVENOUS; SUBCUTANEOUS at 09:10

## 2020-10-12 RX ADMIN — METOCLOPRAMIDE 10 MG: 5 INJECTION, SOLUTION INTRAMUSCULAR; INTRAVENOUS at 09:10

## 2020-10-12 RX ADMIN — LEVOTHYROXINE SODIUM ANHYDROUS 40 MCG: 100 INJECTION, POWDER, LYOPHILIZED, FOR SOLUTION INTRAVENOUS at 09:10

## 2020-10-12 RX ADMIN — OXYCODONE HYDROCHLORIDE 5 MG: 5 TABLET ORAL at 11:10

## 2020-10-12 RX ADMIN — IRON SUCROSE 200 MG: 20 INJECTION, SOLUTION INTRAVENOUS at 09:10

## 2020-10-12 RX ADMIN — SMOFLIPID 250 ML: 6; 6; 5; 3 INJECTION, EMULSION INTRAVENOUS at 09:10

## 2020-10-12 RX ADMIN — OXYCODONE HYDROCHLORIDE 5 MG: 5 TABLET ORAL at 06:10

## 2020-10-12 RX ADMIN — OXYCODONE HYDROCHLORIDE 5 MG: 5 TABLET ORAL at 02:10

## 2020-10-12 RX ADMIN — METOCLOPRAMIDE 10 MG: 5 INJECTION, SOLUTION INTRAMUSCULAR; INTRAVENOUS at 05:10

## 2020-10-12 RX ADMIN — METOCLOPRAMIDE 10 MG: 5 INJECTION, SOLUTION INTRAMUSCULAR; INTRAVENOUS at 03:10

## 2020-10-12 RX ADMIN — OXYCODONE HYDROCHLORIDE 5 MG: 5 TABLET ORAL at 05:10

## 2020-10-12 RX ADMIN — PANTOPRAZOLE SODIUM 40 MG: 40 INJECTION, POWDER, LYOPHILIZED, FOR SOLUTION INTRAVENOUS at 09:10

## 2020-10-12 RX ADMIN — Medication 10 ML: at 06:10

## 2020-10-12 RX ADMIN — Medication 10 ML: at 12:10

## 2020-10-12 RX ADMIN — HEPARIN SODIUM 5000 UNITS: 5000 INJECTION INTRAVENOUS; SUBCUTANEOUS at 05:10

## 2020-10-12 NOTE — PLAN OF CARE
POC reviewed with patient, states understanding. AOx4, intermittently confused, easily reoriented. VS WDL on 3L NC. ML incision, dressing on proximal side c/d/i, staples intact on distal side. X 2 punture sites on ABD from KERRY drain sites, dressing c/d/i. G tube remained clamped this shift. Tolerated diet, no complaints of nausea or pain. TPN infusing per order. Will continue to manage POC.

## 2020-10-12 NOTE — PLAN OF CARE
Problem: Occupational Therapy Goal  Goal: Occupational Therapy Goal  Description: Goals to be met by: 10/19/20     Patient will increase functional independence with ADLs by performing:    UE Dressing with min A  LE Dressing with min A  Grooming while sitting EOB with min A-MET 10/5  Toileting from BS  with min A for hygiene and clothing management.  -MET 10/8  Toilet transfer to BS with min A  Pt will tolerate sitting EOB with VSS with CGA while engaging in functional reaching tasks     Outcome: Ongoing, Progressing     Goals reviewed and remain appropriate, continue POC    PAULINE Rob  10/12/2020   Pager #: [phone]

## 2020-10-12 NOTE — SUBJECTIVE & OBJECTIVE
Interval History: no acute events. G tube clamped. Tolerating some liquids.     Medications:  Continuous Infusions:   TPN ADULT CENTRAL LINE CUSTOM 60 mL/hr at 10/11/20 2303    TPN ADULT CENTRAL LINE CUSTOM       Scheduled Meds:   bisacodyL  10 mg Rectal Daily    heparin (porcine)  5,000 Units Subcutaneous Q8H    iron sucrose (VENOFER) IVPB  200 mg Intravenous Daily    levothyroxine  40 mcg Intravenous Daily    lipid (SMOFLIPID)  250 mL Intravenous Daily    lipid (SMOFLIPID)  250 mL Intravenous Daily    metoclopramide HCl  10 mg Intravenous Q8H    pantoprazole  40 mg Intravenous Daily    polyethylene glycol  17 g Oral Daily    sodium chloride 0.9%  10 mL Intravenous Q6H     PRN Meds:sodium chloride, acetaminophen, albuterol-ipratropium, dextrose 50%, dextrose 50%, enalaprilat, glucagon (human recombinant), insulin aspart U-100, iohexol, ondansetron, oxyCODONE, promethazine, sodium chloride 0.9%, Flushing PICC Protocol **AND** sodium chloride 0.9% **AND** sodium chloride 0.9%     Review of patient's allergies indicates:   Allergen Reactions    Codeine Itching     Other reaction(s): Itching    Lipitor [atorvastatin] Other (See Comments)     Other reaction(s): Muscle pain  Muscle cranmps    Morphine Itching     Other reaction(s): nausea and vomiting     Zoloft [sertraline] Other (See Comments)     Tremors/muscle spasms     Objective:     Vital Signs (Most Recent):  Temp: 98.3 °F (36.8 °C) (10/12/20 0748)  Pulse: 102 (10/12/20 0748)  Resp: 16 (10/12/20 0748)  BP: 135/70 (10/12/20 0748)  SpO2: 96 % (10/12/20 0748) Vital Signs (24h Range):  Temp:  [98.3 °F (36.8 °C)-100.3 °F (37.9 °C)] 98.3 °F (36.8 °C)  Pulse:  [] 102  Resp:  [16-20] 16  SpO2:  [93 %-99 %] 96 %  BP: (135-159)/(61-86) 135/70     Weight: 67.8 kg (149 lb 7.6 oz)  Body mass index is 30.19 kg/m².    Intake/Output - Last 3 Shifts       10/10 0700 - 10/11 0659 10/11 0700 - 10/12 0659 10/12 0700 - 10/13 0659    P.O.  580     Blood        NG/GT       IV Piggyback 1000 150     TPN 1775.7 1200.3     Total Intake(mL/kg) 2775.7 (40.9) 1930.3 (28.5)     Urine (mL/kg/hr)  1450 (0.9)     Emesis/NG output       Drains       Other       Stool  0     Total Output  1450     Net +2775.7 +480.3            Urine Occurrence 2 x      Stool Occurrence  1 x           Physical Exam  Constitutional:       General: She is not in acute distress.  Cardiovascular:      Rate and Rhythm: Normal rate.   Pulmonary:      Effort: Pulmonary effort is normal.      Comments: On supplemental oxygen  Abdominal:      General: There is no distension.      Palpations: Abdomen is soft.      Tenderness: There is no abdominal tenderness.      Comments: G tube in place, clamped   Musculoskeletal:      Right lower leg: Edema present.      Left lower leg: Edema present.      Comments: Diffuse edema, worse at dependent locations  Left lower extremity red and swollen   Skin:     General: Skin is warm and dry.   Neurological:      Mental Status: She is alert.         Significant Labs:  CBC:   Recent Labs   Lab 10/12/20  0320   WBC 9.51   RBC 2.48*   HGB 7.0*   HCT 23.6*      MCV 95   MCH 28.2   MCHC 29.7*     CMP:   Recent Labs   Lab 10/12/20  0320      CALCIUM 8.5*   ALBUMIN 1.5*   PROT 6.1      K 3.5   CO2 30*      BUN 29*   CREATININE 0.6   ALKPHOS 532*   ALT 54*   AST 75*   BILITOT 3.7*       Significant Diagnostics:  I have reviewed all pertinent imaging results/findings within the past 24 hours.

## 2020-10-12 NOTE — PROGRESS NOTES
[facility]  General Surgery  Progress Note    Subjective:     History of Present Illness:  Ms. Hernandez is a 51yo F w/PMH of von Gierke disease s/p liver transplant (2002, on tacro + MMF, follows Dr. Nielsen), hx chronic rejection (bx 2015 with acute and chronic rejection s/p steroids), biliary stricture and ductopenic rejection, recently with cirrhosis on fibroscan (10/2019), HTN, and depression who presents as a transfer for further evaluation of gastric outlet obstruction and esophageal stricturing.  Patient decompensated requiring multiple pressors and intubation. CT scan showed free air. Prior to intubation patient reported severe abdominal pain.   states that patient has had epigastric pain, nausea, and vomiting for several days.     Post-Op Info:  Procedure(s) (LRB):  LAPAROTOMY, EXPLORATORY, DRAINAGE OF ABSCESS, REPAIR OF GASTRIC PERFORATION, GASTROSTOMY TUBE PLACEMENT (N/A)  APPLICATION, WOUND VAC (N/A)   24 Days Post-Op     Interval History: no acute events. G tube clamped. Tolerating some liquids.     Medications:  Continuous Infusions:   TPN ADULT CENTRAL LINE CUSTOM 60 mL/hr at 10/11/20 2303    TPN ADULT CENTRAL LINE CUSTOM       Scheduled Meds:   bisacodyL  10 mg Rectal Daily    heparin (porcine)  5,000 Units Subcutaneous Q8H    iron sucrose (VENOFER) IVPB  200 mg Intravenous Daily    levothyroxine  40 mcg Intravenous Daily    lipid (SMOFLIPID)  250 mL Intravenous Daily    lipid (SMOFLIPID)  250 mL Intravenous Daily    metoclopramide HCl  10 mg Intravenous Q8H    pantoprazole  40 mg Intravenous Daily    polyethylene glycol  17 g Oral Daily    sodium chloride 0.9%  10 mL Intravenous Q6H     PRN Meds:sodium chloride, acetaminophen, albuterol-ipratropium, dextrose 50%, dextrose 50%, enalaprilat, glucagon (human recombinant), insulin aspart U-100, iohexol, ondansetron, oxyCODONE, promethazine, sodium chloride 0.9%, Flushing PICC Protocol **AND** sodium chloride 0.9%  **AND** sodium chloride 0.9%     Review of patient's allergies indicates:   Allergen Reactions    Codeine Itching     Other reaction(s): Itching    Lipitor [atorvastatin] Other (See Comments)     Other reaction(s): Muscle pain  Muscle cranmps    Morphine Itching     Other reaction(s): nausea and vomiting     Zoloft [sertraline] Other (See Comments)     Tremors/muscle spasms     Objective:     Vital Signs (Most Recent):  Temp: 98.3 °F (36.8 °C) (10/12/20 0748)  Pulse: 102 (10/12/20 0748)  Resp: 16 (10/12/20 0748)  BP: 135/70 (10/12/20 0748)  SpO2: 96 % (10/12/20 0748) Vital Signs (24h Range):  Temp:  [98.3 °F (36.8 °C)-100.3 °F (37.9 °C)] 98.3 °F (36.8 °C)  Pulse:  [] 102  Resp:  [16-20] 16  SpO2:  [93 %-99 %] 96 %  BP: (135-159)/(61-86) 135/70     Weight: 67.8 kg (149 lb 7.6 oz)  Body mass index is 30.19 kg/m².    Intake/Output - Last 3 Shifts       10/10 0700 - 10/11 0659 10/11 0700 - 10/12 0659 10/12 0700 - 10/13 0659    P.O.  580     Blood       NG/GT       IV Piggyback 1000 150     TPN 1775.7 1200.3     Total Intake(mL/kg) 2775.7 (40.9) 1930.3 (28.5)     Urine (mL/kg/hr)  1450 (0.9)     Emesis/NG output       Drains       Other       Stool  0     Total Output  1450     Net +2775.7 +480.3            Urine Occurrence 2 x      Stool Occurrence  1 x           Physical Exam  Constitutional:       General: She is not in acute distress.  Cardiovascular:      Rate and Rhythm: Normal rate.   Pulmonary:      Effort: Pulmonary effort is normal.      Comments: On supplemental oxygen  Abdominal:      General: There is no distension.      Palpations: Abdomen is soft.      Tenderness: There is no abdominal tenderness.      Comments: G tube in place, clamped   Musculoskeletal:      Right lower leg: Edema present.      Left lower leg: Edema present.      Comments: Diffuse edema, worse at dependent locations  Left lower extremity red and swollen   Skin:     General: Skin is warm and dry.   Neurological:      Mental  Status: She is alert.         Significant Labs:  CBC:   Recent Labs   Lab 10/12/20  0320   WBC 9.51   RBC 2.48*   HGB 7.0*   HCT 23.6*      MCV 95   MCH 28.2   MCHC 29.7*     CMP:   Recent Labs   Lab 10/12/20  0320      CALCIUM 8.5*   ALBUMIN 1.5*   PROT 6.1      K 3.5   CO2 30*      BUN 29*   CREATININE 0.6   ALKPHOS 532*   ALT 54*   AST 75*   BILITOT 3.7*       Significant Diagnostics:  I have reviewed all pertinent imaging results/findings within the past 24 hours.    Assessment/Plan:     * Perforated abdominal viscus  50 y.o. female w/ free air on CT scan. S/p emergent ex lap on 9/4 w/ findings of gastric perforation, primary repair. Now w/  abdominal closure on 9/6, skin stapled closed, KERRY drains removed. Class A to OR on 9/18 for free air and extrav or oral contrast seen on CT. CT on 9/25 redemonstrated contrast leaking from stomach, but well-drained with surgical drain.    CLD/TPN  History of gastroparesis, per , will start reglan  Clamp G tube  Lasix BID  DVT US left leg negative  Iron supplementation  D/c'd abx  Wound vac discontinued. Continue local wound care  PT/OT    Dispo: Discharge planning, recommending SNF         Bethanie Li MD  General Surgery  [facility]

## 2020-10-12 NOTE — PT/OT/SLP PROGRESS
Physical Therapy Treatment    Patient Name:  Elda Hernandez   MRN:  [id number]    Recommendations:     Discharge Recommendations:  nursing facility, skilled   Discharge Equipment Recommendations: bedside commode, bath bench, walker, rolling, wheelchair   Barriers to discharge: decreased functional mobility    Assessment:     Elda Hernandez is a 50 y.o. female admitted with a medical diagnosis of Perforated abdominal viscus.  She presents with the following impairments/functional limitations:  weakness, impaired endurance, impaired self care skills, impaired functional mobilty, gait instability, impaired balance, impaired cognition, decreased coordination, decreased lower extremity function, impaired cardiopulmonary response to activity.  Tolerated session with c/o weakness.  Performed mobility with CGA-max A.  Pt able to take few steps to bedside chair using HHAx2 and demo decreased gait speed, FFP, excessive B hip and knee flexion, and unsteadiness.  Pt safe to transfer to/from bedside chair with assistance of 1x person (squat pivot).  Pt would benefit from continued skilled acute PT 4x/wk to improve functional mobility.      Rehab Prognosis: Good; patient would benefit from acute skilled PT services to address these deficits and reach maximum level of function.    Recent Surgery: Procedure(s) (LRB):  LAPAROTOMY, EXPLORATORY, DRAINAGE OF ABSCESS, REPAIR OF GASTRIC PERFORATION, GASTROSTOMY TUBE PLACEMENT (N/A)  APPLICATION, WOUND VAC (N/A) 24 Days Post-Op    Plan:     During this hospitalization, patient to be seen 4 x/week to address the identified rehab impairments via gait training, therapeutic activities, therapeutic exercises, neuromuscular re-education and progress toward the following goals:    · Plan of Care Expires:  11/01/20    Subjective     Chief Complaint: weakness  Patient/Family Comments/goals: to return home  Pain/Comfort:  · Pain Rating 1: 0/10      Objective:     Communicated with RN and OT  prior to session.  Patient found HOB elevated with peripheral IV, PICC line, PureWick, oxygen, telemetry upon PT entry to room.     General Precautions: Standard, fall   Orthopedic Precautions:N/A   Braces: N/A     Functional Mobility:  · Bed Mobility:     · Rolling Left:  contact guard assistance  · Scooting: contact guard assistance  · Supine to Sit: contact guard assistance  · Transfers:     · Sit to Stand:  maximal assistance with hand-held assist  · Bed to Chair: maximal assistance with  hand-held assist  using  Step Transfer  · Gait: 3' with HHAx2 max A   · demo decreased gait speed, FFP, excessive B hip and knee flexion, and unsteadiness  · Balance: standing (max A)      AM-PAC 6 CLICK MOBILITY  Turning over in bed (including adjusting bedclothes, sheets and blankets)?: 3  Sitting down on and standing up from a chair with arms (e.g., wheelchair, bedside commode, etc.): 2  Moving from lying on back to sitting on the side of the bed?: 3  Moving to and from a bed to a chair (including a wheelchair)?: 2  Need to walk in hospital room?: 2  Climbing 3-5 steps with a railing?: 1  Basic Mobility Total Score: 13       Therapeutic Activities and Exercises:  Pt educated on: PT role/POC; safety c mobility; benefits of OOB activities; performing therex; d/c recs - v/u  -sat EOB x8mins  -sit<>stand 5x  -standing x1min  -transferred to [facility] for voiding  -assistance needed for annel-care  -repositioned in chair for comfort    Patient left up in chair with all lines intact, call button in reach and RN notified..    GOALS:   Multidisciplinary Problems     Physical Therapy Goals        Problem: Physical Therapy Goal    Goal Priority Disciplines Outcome Goal Variances Interventions   Physical Therapy Goal     PT, PT/OT Ongoing, Progressing     Description: Goals to be met by: 10/15/2020     Patient will increase functional independence with mobility by performin. Supine to sit with Moderate Assistance - MET 10/5/2020  2.  Rolling to Left and Right with Moderate Assistance. - MET 10/5/2020  3. Sit to stand transfer with Moderate Assistance - MET 10/5/2020  4. Sitting at edge of bed x10 minutes with Stand-by Assistance - MET 10/5/2020    REVISED:    1. Supine to sit with Set-up [place]  2. Sit to supine with Set-up [place]  3. Sit to stand transfer with Supervision  4. Bed to chair transfer with Minimal Assistance using Rolling Walker  5. Gait  x 50 feet with Minimal Assistance using Rolling Walker.                      Time Tracking:     PT Received On: 10/12/20  PT Start Time: 0842     PT Stop Time: 0910  PT Total Time (min): 28 min     Billable Minutes: Therapeutic Activity 28 min    Treatment Type: Treatment  PT/PTA: PT     PTA Visit Number: 0     John Cruz PT  10/12/2020

## 2020-10-12 NOTE — PLAN OF CARE
Pt free of any injury or falls, VSS, skin intact. Scheduled and PRN meds given as ordered or when requested. Demonstrated ability to use call light when needed. Bed locked in lowest position. Care plan reviewed w/ patient and education given. Pt is not demonstrating any overt S/S of pain or distress at this time. Will continue to monitor.

## 2020-10-12 NOTE — PLAN OF CARE
10/12/20 1231   Post-Acute Status   Post-Acute Authorization Placement   Post-Acute Placement Status Referrals Sent   Per [facility], [employer] has denied the appeal for LTAC They are saying pt is appropriate for snf or rehab level of care.   JESSICA sent out referrals to [facility] and [facility]- both declined due to pt being on TPN.  Waiting to hear back from Boris Steiner and Neil GARCIA.      UPDATE:2:40 PM  JESSICA heard back from [facility]. She was told per pt's insurance that we can resubmit for auth in 60 days. [facility] looked into [facility] with the [facility] but they cannot staff the pt's need for TPN.  JESSICA called [facility] at [phone] to get more information. JESSICA spoke to Caroline. She stated pt's status would have to change for the worst before [facility] could resubmit, if not then we would have to wait 60 days. JESSICA asked for assistance with this considering SNF's cannot take TPN. Waiting for a call back from insurer.    Chuyita Monterroso, REBECCA  [facility]  [phone]

## 2020-10-12 NOTE — ASSESSMENT & PLAN NOTE
50 y.o. female w/ free air on CT scan. S/p emergent ex lap on 9/4 w/ findings of gastric perforation, primary repair. Now w/  abdominal closure on 9/6, skin stapled closed, KERRY drains removed. Class A to OR on 9/18 for free air and extrav or oral contrast seen on CT. CT on 9/25 redemonstrated contrast leaking from stomach, but well-drained with surgical drain.    CLD/TPN  History of gastroparesis, per , will start reglan  Clamp G tube  Lasix BID  DVT US left leg negative  Iron supplementation  D/c'd abx  Wound vac discontinued. Continue local wound care  PT/OT    Dispo: Discharge planning, recommending SNF

## 2020-10-12 NOTE — PT/OT/SLP PROGRESS
"Occupational Therapy   Treatment    Name: Elda Hernandez  MRN: [id number]  Admitting Diagnosis:  Perforated abdominal viscus  24 Days Post-Op  *co-treatment with PT  Recommendations:     Discharge Recommendations: nursing facility, skilled  Discharge Equipment Recommendations:  bedside commode, bath bench, walker, rolling, wheelchair  Barriers to discharge:  None    Assessment:     Elda Hernandez is a 50 y.o. female with a medical diagnosis of Perforated abdominal viscus.  She presents with HOB elevated, willing to participate in OT/PT session. Increased confusion noted this date, requiring frequent re-direction to task. Pt with c/o of BLE weakness, requiring increased level of assistance as compared to previous sessions. Pt stated she did not sit in bedside chair or EOB over the weekend. Performance deficits affecting function are weakness, impaired endurance, impaired self care skills, impaired functional mobilty, gait instability, impaired balance, impaired cognition, decreased lower extremity function, decreased coordination, impaired cardiopulmonary response to activity. Pt would benefit from skilled OT services in order to maximize independence with ADLs and facilitate safe discharge. Pt would benefit from SNF upon discharge to return to OF and decrease burden of care.     Rehab Prognosis:  Fair; patient would benefit from acute skilled OT services to address these deficits and reach maximum level of function.       Plan:     Patient to be seen 4 x/week to address the above listed problems via self-care/home management, therapeutic activities, therapeutic exercises  · Plan of Care Expires: 10/15/20  · Plan of Care Reviewed with: patient    Subjective     "I don't want to make a mess on the floor" -while sitting on bedside commode completing toileting    Pain/Comfort:  · Pain Rating 1: 0/10(pt with no c/o of pain)    Objective:     Communicated with: RN and PT prior to session.  Patient found HOB elevated " with PICC line, KERRY drain, oxygen, pulse ox (continuous), PureWick upon OT entry to room.    General Precautions: Standard, fall   Orthopedic Precautions:N/A   Braces: N/A     Occupational Performance:     Bed Mobility:    · Patient completed Rolling/Turning to Left with  contact guard assistance  · Patient completed Scooting/Bridging with contact guard assistance  · Patient completed Supine to Sit with contact guard assistance     Functional Mobility/Transfers:  · Patient completed Sit <> Stand Transfer with moderate assistance and of 2 persons  with  hand-held assist from EOB  · Patient completed Bed> BSC Toilet Transfer Step Transfer technique with maximum assistance and of 2 persons with  hand-held assist  · Patient completed BSC<> Chair Transfer using Step Transfer technique with maximum assistance and of 2 persons with hand-held assist    Activities of Daily Living:  · Feeding:  set-up A to drink water  · Toileting: maximal assistance posterior hygiene   · Grooming: set-up A to wash hands with bath wipe      AMPAC 6 Click ADL: 15    Treatment & Education:  -Therapist provided facilitation and instruction of proper body mechanics, energy conservation, and fall prevention strategies during tasks listed above.  -Pt educated on role of OT, POC and goals for therapy  -Pt educated on importance of OOB activities with staff member assistance and sitting OOB majority of the day.   -Pt verbalized understanding. Pt expressed no further concerns/questions  -Whiteboard updated    Patient left up in chair with all lines intact, call button in reach and RN notifiedEducation:      GOALS:   Multidisciplinary Problems     Occupational Therapy Goals        Problem: Occupational Therapy Goal    Goal Priority Disciplines Outcome Interventions   Occupational Therapy Goal     OT, PT/OT Ongoing, Progressing    Description: Goals to be met by: 10/19/20     Patient will increase functional independence with ADLs by performing:    UE  Dressing with min A  LE Dressing with min A  Grooming while sitting EOB with min A-MET 10/5  Toileting from BS  with min A for hygiene and clothing management.  -MET 10/8  Toilet transfer to BS with min A  Pt will tolerate sitting EOB with VSS with CGA while engaging in functional reaching tasks                      Time Tracking:     OT Date of Treatment: 10/12/20  OT Start Time: 0843  OT Stop Time: 0910  OT Total Time (min): 27 min    Billable Minutes:Self Care/Home Management 27    Stephanie Bautista OT  10/12/2020

## 2020-10-12 NOTE — PLAN OF CARE
Problem: Physical Therapy Goal  Goal: Physical Therapy Goal  Description: Goals to be met by: 10/24/2020     Patient will increase functional independence with mobility by performin. Supine to sit with Moderate Assistance - MET 10/5/2020  2. Rolling to Left and Right with Moderate Assistance. - MET 10/5/2020  3. Sit to stand transfer with Moderate Assistance - MET 10/5/2020  4. Sitting at edge of bed x10 minutes with Stand-by Assistance - MET 10/5/2020    REVISED:    1. Supine to sit with Set-up [place]  2. Sit to supine with Set-up [place]  3. Sit to stand transfer with Supervision  4. Bed to chair transfer with Minimal Assistance using Rolling Walker  5. Gait  x 50 feet with Minimal Assistance using Rolling Walker.     Outcome: Ongoing, Progressing   John Cruz PT,DPT  10/12/2020

## 2020-10-13 LAB
ALBUMIN SERPL BCP-MCNC: 1.5 G/DL (ref 3.5–5.2)
ALP SERPL-CCNC: 555 U/L (ref 55–135)
ALT SERPL W/O P-5'-P-CCNC: 57 U/L (ref 10–44)
ANION GAP SERPL CALC-SCNC: 5 MMOL/L (ref 8–16)
ANISOCYTOSIS BLD QL SMEAR: SLIGHT
AST SERPL-CCNC: 87 U/L (ref 10–40)
BASOPHILS # BLD AUTO: 0.05 K/UL (ref 0–0.2)
BASOPHILS NFR BLD: 0.5 % (ref 0–1.9)
BILIRUB SERPL-MCNC: 3.9 MG/DL (ref 0.1–1)
BUN SERPL-MCNC: 30 MG/DL (ref 6–20)
CALCIUM SERPL-MCNC: 8.4 MG/DL (ref 8.7–10.5)
CHLORIDE SERPL-SCNC: 106 MMOL/L (ref 95–110)
CO2 SERPL-SCNC: 31 MMOL/L (ref 23–29)
CREAT SERPL-MCNC: 0.6 MG/DL (ref 0.5–1.4)
DIFFERENTIAL METHOD: ABNORMAL
EOSINOPHIL # BLD AUTO: 0.6 K/UL (ref 0–0.5)
EOSINOPHIL NFR BLD: 6.2 % (ref 0–8)
ERYTHROCYTE [DISTWIDTH] IN BLOOD BY AUTOMATED COUNT: 18.3 % (ref 11.5–14.5)
EST. GFR  (AFRICAN AMERICAN): >60 ML/MIN/1.73 M^2
EST. GFR  (NON AFRICAN AMERICAN): >60 ML/MIN/1.73 M^2
GLUCOSE SERPL-MCNC: 104 MG/DL (ref 70–110)
HCT VFR BLD AUTO: 28 % (ref 37–48.5)
HGB BLD-MCNC: 8.5 G/DL (ref 12–16)
HYPOCHROMIA BLD QL SMEAR: ABNORMAL
IMM GRANULOCYTES # BLD AUTO: 0.11 K/UL (ref 0–0.04)
IMM GRANULOCYTES NFR BLD AUTO: 1.1 % (ref 0–0.5)
LYMPHOCYTES # BLD AUTO: 1 K/UL (ref 1–4.8)
LYMPHOCYTES NFR BLD: 10.7 % (ref 18–48)
MAGNESIUM SERPL-MCNC: 1.5 MG/DL (ref 1.6–2.6)
MCH RBC QN AUTO: 28.6 PG (ref 27–31)
MCHC RBC AUTO-ENTMCNC: 30.4 G/DL (ref 32–36)
MCV RBC AUTO: 94 FL (ref 82–98)
MONOCYTES # BLD AUTO: 1.2 K/UL (ref 0.3–1)
MONOCYTES NFR BLD: 12.7 % (ref 4–15)
NEUTROPHILS # BLD AUTO: 6.7 K/UL (ref 1.8–7.7)
NEUTROPHILS NFR BLD: 68.8 % (ref 38–73)
NRBC BLD-RTO: 0 /100 WBC
OVALOCYTES BLD QL SMEAR: ABNORMAL
PHOSPHATE SERPL-MCNC: 2.9 MG/DL (ref 2.7–4.5)
PLATELET # BLD AUTO: 180 K/UL (ref 150–350)
PLATELET BLD QL SMEAR: ABNORMAL
PMV BLD AUTO: 12.9 FL (ref 9.2–12.9)
POCT GLUCOSE: 106 MG/DL (ref 70–110)
POCT GLUCOSE: 107 MG/DL (ref 70–110)
POIKILOCYTOSIS BLD QL SMEAR: SLIGHT
POLYCHROMASIA BLD QL SMEAR: ABNORMAL
POTASSIUM SERPL-SCNC: 3.8 MMOL/L (ref 3.5–5.1)
PROT SERPL-MCNC: 6.4 G/DL (ref 6–8.4)
RBC # BLD AUTO: 2.97 M/UL (ref 4–5.4)
SODIUM SERPL-SCNC: 142 MMOL/L (ref 136–145)
TACROLIMUS BLD-MCNC: 4.2 NG/ML (ref 5–15)
WBC # BLD AUTO: 9.7 K/UL (ref 3.9–12.7)

## 2020-10-13 PROCEDURE — 63600175 PHARM REV CODE 636 W HCPCS: Performed by: SURGERY

## 2020-10-13 PROCEDURE — 20600001 HC STEP DOWN PRIVATE ROOM

## 2020-10-13 PROCEDURE — B4185 PARENTERAL SOL 10 GM LIPIDS: HCPCS | Performed by: STUDENT IN AN ORGANIZED HEALTH CARE EDUCATION/TRAINING PROGRAM

## 2020-10-13 PROCEDURE — 80053 COMPREHEN METABOLIC PANEL: CPT

## 2020-10-13 PROCEDURE — A4216 STERILE WATER/SALINE, 10 ML: HCPCS | Performed by: SURGERY

## 2020-10-13 PROCEDURE — 85025 COMPLETE CBC W/AUTO DIFF WBC: CPT

## 2020-10-13 PROCEDURE — A4217 STERILE WATER/SALINE, 500 ML: HCPCS | Performed by: SURGERY

## 2020-10-13 PROCEDURE — 25000003 PHARM REV CODE 250: Performed by: STUDENT IN AN ORGANIZED HEALTH CARE EDUCATION/TRAINING PROGRAM

## 2020-10-13 PROCEDURE — 84100 ASSAY OF PHOSPHORUS: CPT

## 2020-10-13 PROCEDURE — 63600175 PHARM REV CODE 636 W HCPCS: Performed by: STUDENT IN AN ORGANIZED HEALTH CARE EDUCATION/TRAINING PROGRAM

## 2020-10-13 PROCEDURE — 83735 ASSAY OF MAGNESIUM: CPT

## 2020-10-13 PROCEDURE — 80197 ASSAY OF TACROLIMUS: CPT

## 2020-10-13 PROCEDURE — C9113 INJ PANTOPRAZOLE SODIUM, VIA: HCPCS | Performed by: STUDENT IN AN ORGANIZED HEALTH CARE EDUCATION/TRAINING PROGRAM

## 2020-10-13 PROCEDURE — 25000003 PHARM REV CODE 250: Performed by: SURGERY

## 2020-10-13 RX ORDER — POTASSIUM CHLORIDE 20 MEQ/1
20 TABLET, EXTENDED RELEASE ORAL ONCE
Status: DISCONTINUED | OUTPATIENT
Start: 2020-10-13 | End: 2020-10-14 | Stop reason: HOSPADM

## 2020-10-13 RX ORDER — FUROSEMIDE 10 MG/ML
20 INJECTION INTRAMUSCULAR; INTRAVENOUS ONCE
Status: COMPLETED | OUTPATIENT
Start: 2020-10-13 | End: 2020-10-13

## 2020-10-13 RX ADMIN — Medication 10 ML: at 05:10

## 2020-10-13 RX ADMIN — FUROSEMIDE 20 MG: 10 INJECTION, SOLUTION INTRAMUSCULAR; INTRAVENOUS at 10:10

## 2020-10-13 RX ADMIN — CALCIUM GLUCONATE 1000 MG: 98 INJECTION, SOLUTION INTRAVENOUS at 11:10

## 2020-10-13 RX ADMIN — Medication 10 ML: at 11:10

## 2020-10-13 RX ADMIN — METOCLOPRAMIDE 10 MG: 5 INJECTION, SOLUTION INTRAMUSCULAR; INTRAVENOUS at 10:10

## 2020-10-13 RX ADMIN — Medication 10 ML: at 06:10

## 2020-10-13 RX ADMIN — OXYCODONE HYDROCHLORIDE 5 MG: 5 TABLET ORAL at 10:10

## 2020-10-13 RX ADMIN — SMOFLIPID 250 ML: 6; 6; 5; 3 INJECTION, EMULSION INTRAVENOUS at 10:10

## 2020-10-13 RX ADMIN — METOCLOPRAMIDE 10 MG: 5 INJECTION, SOLUTION INTRAMUSCULAR; INTRAVENOUS at 05:10

## 2020-10-13 RX ADMIN — METOCLOPRAMIDE 10 MG: 5 INJECTION, SOLUTION INTRAMUSCULAR; INTRAVENOUS at 02:10

## 2020-10-13 RX ADMIN — LEVOTHYROXINE SODIUM ANHYDROUS 40 MCG: 100 INJECTION, POWDER, LYOPHILIZED, FOR SOLUTION INTRAVENOUS at 09:10

## 2020-10-13 RX ADMIN — HEPARIN SODIUM 5000 UNITS: 5000 INJECTION INTRAVENOUS; SUBCUTANEOUS at 05:10

## 2020-10-13 RX ADMIN — MAGNESIUM SULFATE HEPTAHYDRATE: 500 INJECTION, SOLUTION INTRAMUSCULAR; INTRAVENOUS at 10:10

## 2020-10-13 RX ADMIN — PANTOPRAZOLE SODIUM 40 MG: 40 INJECTION, POWDER, LYOPHILIZED, FOR SOLUTION INTRAVENOUS at 09:10

## 2020-10-13 RX ADMIN — ONDANSETRON 4 MG: 2 INJECTION INTRAMUSCULAR; INTRAVENOUS at 06:10

## 2020-10-13 RX ADMIN — HEPARIN SODIUM 5000 UNITS: 5000 INJECTION INTRAVENOUS; SUBCUTANEOUS at 02:10

## 2020-10-13 RX ADMIN — Medication 10 ML: at 12:10

## 2020-10-13 RX ADMIN — OXYCODONE HYDROCHLORIDE 5 MG: 5 TABLET ORAL at 06:10

## 2020-10-13 RX ADMIN — HEPARIN SODIUM 5000 UNITS: 5000 INJECTION INTRAVENOUS; SUBCUTANEOUS at 10:10

## 2020-10-13 RX ADMIN — IRON SUCROSE 200 MG: 20 INJECTION, SOLUTION INTRAVENOUS at 09:10

## 2020-10-13 RX ADMIN — OXYCODONE HYDROCHLORIDE 5 MG: 5 TABLET ORAL at 09:10

## 2020-10-13 NOTE — ASSESSMENT & PLAN NOTE
50 y.o. female w/ free air on CT scan. S/p emergent ex lap on 9/4 w/ findings of gastric perforation, primary repair. Now w/  abdominal closure on 9/6, skin stapled closed, KERRY drains removed. Class A to OR on 9/18 for free air and extrav or oral contrast seen on CT. CT on 9/25 redemonstrated contrast leaking from stomach, but well-drained with surgical drain.    Fulls/TPN  History of gastroparesis, per , will start reglan  Clamp G tube  Lasix again today  DVT US left leg negative  Iron supplementation - last dose today 5/5  D/c'd abx  Wound vac discontinued. Staples out. Continue local wound care  PT/OT    Dispo: Discharge planning, needs LTAC for TPN administration.

## 2020-10-13 NOTE — PLAN OF CARE
Pt is not medically ready to discharge.    D/C Plan: A) [facility] (SNF unit) with LTAC nursing care.   B) Home with Home Health and Home Infusion.    CM will follow-up and assist team with discharge needs.      Josephine Fabian RN   - [facility]  [id number]

## 2020-10-13 NOTE — PLAN OF CARE
VSS. 2L NC. Up to chair for most of the afternoon, IS use with encouragement. Pain managed with oxy 5 x1. No acute events, continue POC.

## 2020-10-13 NOTE — TREATMENT PLAN
Hepatology Treatment Plan    Elda Hernandez is a 50 y.o. female admitted to hospital 9/3/2020 (Hospital Day: 41) due to Perforated abdominal viscus. Hepatology following for immunosuppression management.    Lab Results   Component Value Date    TACROLIMUS 6.6 10/12/2020    TACROLIMUS 9.7 10/11/2020    TACROLIMUS 10.5 10/10/2020       Lab Results   Component Value Date    CREATININE 0.6 10/13/2020    CREATININE 0.6 10/12/2020    CREATININE 0.6 10/11/2020       Lab Results   Component Value Date    ALT 57 (H) 10/13/2020    AST 87 (H) 10/13/2020     (H) 04/07/2018    ALKPHOS 555 (H) 10/13/2020    BILITOT 3.9 (H) 10/13/2020    WBC 9.70 10/13/2020    HGB 8.5 (L) 10/13/2020     10/13/2020       Kidney function is stable  Liver enzymes are improving    Plan  - Continue to hold prograf, will restart tomorrow    Please notify hepatology on day of discharge to discuss discharge medications and follow up.     Please obtain daily CBC, BMP, LFT, INR, immunosuppressant trough level    Thank you for involving us in the care of Elda Hernandez. Please call with any additional concerns or questions.    Wayne Patel MD  Gastroenterology Fellow PGY V   [facility]

## 2020-10-13 NOTE — PLAN OF CARE
Plan of care reviewed with patient who demonstrated understanding. Pt. AAOx4, VSS on 3 liters nasal cannula. Abdominal incision, and KERRY insertion sites,  clean dry and intact. G- tube clamped with no complaints of nausea or vomiting. TPN and lipids infusing per MAR. Pt. Complains of mild to moderate pain that is controlled with prn pain medication. Patient turned/ repositioned q2. Bed in low position, bed rails x2, call light within reach, frequent monitoring continued.

## 2020-10-13 NOTE — SUBJECTIVE & OBJECTIVE
Interval History: No acute events overnight.  Tolerating CLD. G tube remains clamped. Up to chair for multiple hours yesterday. Staples out. LTAC pending insurance approval.     Medications:  Continuous Infusions:   TPN ADULT CENTRAL LINE CUSTOM 60 mL/hr at 10/12/20 2127    TPN ADULT CENTRAL LINE CUSTOM       Scheduled Meds:   bisacodyL  10 mg Rectal Daily    calcium gluconate IVPB  1,000 mg Intravenous Once    heparin (porcine)  5,000 Units Subcutaneous Q8H    iron sucrose (VENOFER) IVPB  200 mg Intravenous Daily    levothyroxine  40 mcg Intravenous Daily    lipid (SMOFLIPID)  250 mL Intravenous Daily    metoclopramide HCl  10 mg Intravenous Q8H    pantoprazole  40 mg Intravenous Daily    polyethylene glycol  17 g Oral Daily    sodium chloride 0.9%  10 mL Intravenous Q6H     PRN Meds:sodium chloride, acetaminophen, albuterol-ipratropium, dextrose 50%, dextrose 50%, enalaprilat, glucagon (human recombinant), insulin aspart U-100, iohexol, ondansetron, oxyCODONE, promethazine, sodium chloride 0.9%, Flushing PICC Protocol **AND** sodium chloride 0.9% **AND** sodium chloride 0.9%     Review of patient's allergies indicates:   Allergen Reactions    Codeine Itching     Other reaction(s): Itching    Lipitor [atorvastatin] Other (See Comments)     Other reaction(s): Muscle pain  Muscle cranmps    Morphine Itching     Other reaction(s): nausea and vomiting     Zoloft [sertraline] Other (See Comments)     Tremors/muscle spasms     Objective:     Vital Signs (Most Recent):  Temp: 99 °F (37.2 °C) (10/13/20 0748)  Pulse: 105 (10/13/20 0817)  Resp: 16 (10/13/20 0907)  BP: (!) 150/72 (10/13/20 0749)  SpO2: (!) 93 % (10/13/20 0817) Vital Signs (24h Range):  Temp:  [98.4 °F (36.9 °C)-99 °F (37.2 °C)] 99 °F (37.2 °C)  Pulse:  [] 105  Resp:  [16-22] 16  SpO2:  [93 %-100 %] 93 %  BP: (123-163)/() 150/72     Weight: 67.8 kg (149 lb 7.6 oz)  Body mass index is 30.19 kg/m².    Intake/Output - Last 3 Shifts        10/11 0700 - 10/12 0659 10/12 0700 - 10/13 0659 10/13 0700 - 10/14 0659    P.O. 580      Blood  957.5     IV Piggyback 150      TPN 1200.3 610.4     Total Intake(mL/kg) 1930.3 (28.5) 1567.9 (23.1)     Urine (mL/kg/hr) 1450 (0.9) 900 (0.6)     Stool 0 0     Total Output 1450 900     Net +480.3 +667.9            Stool Occurrence 1 x 0 x           Physical Exam  Constitutional:       General: She is not in acute distress.  Cardiovascular:      Rate and Rhythm: Normal rate.   Pulmonary:      Effort: Pulmonary effort is normal.      Comments: On supplemental oxygen  Abdominal:      General: There is no distension.      Palpations: Abdomen is soft.      Tenderness: There is no abdominal tenderness.      Comments: G tube in place, clamped   Musculoskeletal:      Right lower leg: No edema.      Left lower leg: No edema.      Comments: Diffuse edema, worse at dependent locations     Skin:     General: Skin is warm and dry.   Neurological:      Mental Status: She is alert.         Significant Labs:  CBC:   Recent Labs   Lab 10/13/20  0500   WBC 9.70   RBC 2.97*   HGB 8.5*   HCT 28.0*      MCV 94   MCH 28.6   MCHC 30.4*     CMP:   Recent Labs   Lab 10/13/20  0500      CALCIUM 8.4*   ALBUMIN 1.5*   PROT 6.4      K 3.8   CO2 31*      BUN 30*   CREATININE 0.6   ALKPHOS 555*   ALT 57*   AST 87*   BILITOT 3.9*       Significant Diagnostics:  I have reviewed all pertinent imaging results/findings within the past 24 hours.

## 2020-10-13 NOTE — PLAN OF CARE
10/13/20 1622   Post-Acute Status   Post-Acute Authorization Placement   Post-Acute Placement Status Authorization Obtained   Pt approved for [facility] with [facility] from [facility]. Pt can be admitted tomorrow.    Chuyita Monterroso LMSW  [facility]  [phone]

## 2020-10-13 NOTE — PLAN OF CARE
10/13/20 1614   Post-Acute Status   Post-Acute Authorization Placement   Post-Acute Placement Status Pending Payor Review     JESSICA spoke with Latisha at [employer] ([phone]), sent Ed note, and plan of care on discharge planning assessment for payor review via fax to ([phone]).  Reference number: [id number].    JESSICA will continue to follow case status.      Flory Yeung LMSW  PRN-  [facility]

## 2020-10-13 NOTE — PROGRESS NOTES
[facility]  General Surgery  Progress Note    Subjective:     History of Present Illness:  Ms. Hernandez is a 49yo F w/PMH of von Gierke disease s/p liver transplant (2002, on tacro + MMF, follows Dr. Nielsen), hx chronic rejection (bx 2015 with acute and chronic rejection s/p steroids), biliary stricture and ductopenic rejection, recently with cirrhosis on fibroscan (10/2019), HTN, and depression who presents as a transfer for further evaluation of gastric outlet obstruction and esophageal stricturing.  Patient decompensated requiring multiple pressors and intubation. CT scan showed free air. Prior to intubation patient reported severe abdominal pain.   states that patient has had epigastric pain, nausea, and vomiting for several days.     Post-Op Info:  Procedure(s) (LRB):  LAPAROTOMY, EXPLORATORY, DRAINAGE OF ABSCESS, REPAIR OF GASTRIC PERFORATION, GASTROSTOMY TUBE PLACEMENT (N/A)  APPLICATION, WOUND VAC (N/A)   25 Days Post-Op     Interval History: No acute events overnight.  Tolerating CLD. G tube remains clamped. Up to chair for multiple hours yesterday. Staples out. LTAC pending insurance approval.     Medications:  Continuous Infusions:   TPN ADULT CENTRAL LINE CUSTOM 60 mL/hr at 10/12/20 2127    TPN ADULT CENTRAL LINE CUSTOM       Scheduled Meds:   bisacodyL  10 mg Rectal Daily    calcium gluconate IVPB  1,000 mg Intravenous Once    heparin (porcine)  5,000 Units Subcutaneous Q8H    iron sucrose (VENOFER) IVPB  200 mg Intravenous Daily    levothyroxine  40 mcg Intravenous Daily    lipid (SMOFLIPID)  250 mL Intravenous Daily    metoclopramide HCl  10 mg Intravenous Q8H    pantoprazole  40 mg Intravenous Daily    polyethylene glycol  17 g Oral Daily    sodium chloride 0.9%  10 mL Intravenous Q6H     PRN Meds:sodium chloride, acetaminophen, albuterol-ipratropium, dextrose 50%, dextrose 50%, enalaprilat, glucagon (human recombinant), insulin aspart U-100, iohexol,  ondansetron, oxyCODONE, promethazine, sodium chloride 0.9%, Flushing PICC Protocol **AND** sodium chloride 0.9% **AND** sodium chloride 0.9%     Review of patient's allergies indicates:   Allergen Reactions    Codeine Itching     Other reaction(s): Itching    Lipitor [atorvastatin] Other (See Comments)     Other reaction(s): Muscle pain  Muscle cranmps    Morphine Itching     Other reaction(s): nausea and vomiting     Zoloft [sertraline] Other (See Comments)     Tremors/muscle spasms     Objective:     Vital Signs (Most Recent):  Temp: 99 °F (37.2 °C) (10/13/20 0748)  Pulse: 105 (10/13/20 0817)  Resp: 16 (10/13/20 0907)  BP: (!) 150/72 (10/13/20 0749)  SpO2: (!) 93 % (10/13/20 0817) Vital Signs (24h Range):  Temp:  [98.4 °F (36.9 °C)-99 °F (37.2 °C)] 99 °F (37.2 °C)  Pulse:  [] 105  Resp:  [16-22] 16  SpO2:  [93 %-100 %] 93 %  BP: (123-163)/() 150/72     Weight: 67.8 kg (149 lb 7.6 oz)  Body mass index is 30.19 kg/m².    Intake/Output - Last 3 Shifts       10/11 0700 - 10/12 0659 10/12 0700 - 10/13 0659 10/13 0700 - 10/14 0659    P.O. 580      Blood  957.5     IV Piggyback 150      TPN 1200.3 610.4     Total Intake(mL/kg) 1930.3 (28.5) 1567.9 (23.1)     Urine (mL/kg/hr) 1450 (0.9) 900 (0.6)     Stool 0 0     Total Output 1450 900     Net +480.3 +667.9            Stool Occurrence 1 x 0 x           Physical Exam  Constitutional:       General: She is not in acute distress.  Cardiovascular:      Rate and Rhythm: Normal rate.   Pulmonary:      Effort: Pulmonary effort is normal.      Comments: On supplemental oxygen  Abdominal:      General: There is no distension.      Palpations: Abdomen is soft.      Tenderness: There is no abdominal tenderness.      Comments: G tube in place, clamped   Musculoskeletal:      Right lower leg: No edema.      Left lower leg: No edema.      Comments: Diffuse edema, worse at dependent locations     Skin:     General: Skin is warm and dry.   Neurological:      Mental  Status: She is alert.         Significant Labs:  CBC:   Recent Labs   Lab 10/13/20  0500   WBC 9.70   RBC 2.97*   HGB 8.5*   HCT 28.0*      MCV 94   MCH 28.6   MCHC 30.4*     CMP:   Recent Labs   Lab 10/13/20  0500      CALCIUM 8.4*   ALBUMIN 1.5*   PROT 6.4      K 3.8   CO2 31*      BUN 30*   CREATININE 0.6   ALKPHOS 555*   ALT 57*   AST 87*   BILITOT 3.9*       Significant Diagnostics:  I have reviewed all pertinent imaging results/findings within the past 24 hours.    Assessment/Plan:     * Perforated abdominal viscus  50 y.o. female w/ free air on CT scan. S/p emergent ex lap on 9/4 w/ findings of gastric perforation, primary repair. Now w/  abdominal closure on 9/6, skin stapled closed, KERRY drains removed. Class A to OR on 9/18 for free air and extrav or oral contrast seen on CT. CT on 9/25 redemonstrated contrast leaking from stomach, but well-drained with surgical drain.    Fulls/TPN  History of gastroparesis, per , will start reglan  Clamp G tube  Lasix again today  DVT US left leg negative  Iron supplementation - last dose today 5/5  D/c'd abx  Wound vac discontinued. Staples out. Continue local wound care  PT/OT    Dispo: Discharge planning, needs LTAC for TPN administration.        Constance Martin MD  General Surgery  [facility]

## 2020-10-14 VITALS
TEMPERATURE: 99 F | RESPIRATION RATE: 18 BRPM | SYSTOLIC BLOOD PRESSURE: 157 MMHG | WEIGHT: 149.5 LBS | HEART RATE: 107 BPM | OXYGEN SATURATION: 96 % | BODY MASS INDEX: 30.14 KG/M2 | DIASTOLIC BLOOD PRESSURE: 74 MMHG | HEIGHT: 59 IN

## 2020-10-14 PROBLEM — N17.9 AKI (ACUTE KIDNEY INJURY): Status: RESOLVED | Noted: 2020-09-05 | Resolved: 2020-10-14

## 2020-10-14 LAB
ALBUMIN SERPL BCP-MCNC: 1.4 G/DL (ref 3.5–5.2)
ALP SERPL-CCNC: 538 U/L (ref 55–135)
ALT SERPL W/O P-5'-P-CCNC: 55 U/L (ref 10–44)
ANION GAP SERPL CALC-SCNC: 8 MMOL/L (ref 8–16)
ANISOCYTOSIS BLD QL SMEAR: SLIGHT
AST SERPL-CCNC: 80 U/L (ref 10–40)
BASO STIPL BLD QL SMEAR: ABNORMAL
BASOPHILS # BLD AUTO: 0.05 K/UL (ref 0–0.2)
BASOPHILS NFR BLD: 0.5 % (ref 0–1.9)
BILIRUB SERPL-MCNC: 4.1 MG/DL (ref 0.1–1)
BUN SERPL-MCNC: 29 MG/DL (ref 6–20)
CALCIUM SERPL-MCNC: 8.5 MG/DL (ref 8.7–10.5)
CHLORIDE SERPL-SCNC: 108 MMOL/L (ref 95–110)
CO2 SERPL-SCNC: 26 MMOL/L (ref 23–29)
CREAT SERPL-MCNC: 0.6 MG/DL (ref 0.5–1.4)
DIFFERENTIAL METHOD: ABNORMAL
EOSINOPHIL # BLD AUTO: 0.5 K/UL (ref 0–0.5)
EOSINOPHIL NFR BLD: 4.4 % (ref 0–8)
ERYTHROCYTE [DISTWIDTH] IN BLOOD BY AUTOMATED COUNT: 18.5 % (ref 11.5–14.5)
EST. GFR  (AFRICAN AMERICAN): >60 ML/MIN/1.73 M^2
EST. GFR  (NON AFRICAN AMERICAN): >60 ML/MIN/1.73 M^2
GLUCOSE SERPL-MCNC: 102 MG/DL (ref 70–110)
HCT VFR BLD AUTO: 28.7 % (ref 37–48.5)
HGB BLD-MCNC: 8.5 G/DL (ref 12–16)
IMM GRANULOCYTES # BLD AUTO: 0.07 K/UL (ref 0–0.04)
IMM GRANULOCYTES NFR BLD AUTO: 0.7 % (ref 0–0.5)
LYMPHOCYTES # BLD AUTO: 1 K/UL (ref 1–4.8)
LYMPHOCYTES NFR BLD: 9.7 % (ref 18–48)
MAGNESIUM SERPL-MCNC: 1.5 MG/DL (ref 1.6–2.6)
MCH RBC QN AUTO: 28.2 PG (ref 27–31)
MCHC RBC AUTO-ENTMCNC: 29.6 G/DL (ref 32–36)
MCV RBC AUTO: 95 FL (ref 82–98)
MONOCYTES # BLD AUTO: 1.3 K/UL (ref 0.3–1)
MONOCYTES NFR BLD: 11.9 % (ref 4–15)
NEUTROPHILS # BLD AUTO: 7.8 K/UL (ref 1.8–7.7)
NEUTROPHILS NFR BLD: 72.8 % (ref 38–73)
NRBC BLD-RTO: 0 /100 WBC
PHOSPHATE SERPL-MCNC: 3 MG/DL (ref 2.7–4.5)
PLATELET # BLD AUTO: 185 K/UL (ref 150–350)
PLATELET BLD QL SMEAR: ABNORMAL
PMV BLD AUTO: 13 FL (ref 9.2–12.9)
POCT GLUCOSE: 113 MG/DL (ref 70–110)
POLYCHROMASIA BLD QL SMEAR: ABNORMAL
POTASSIUM SERPL-SCNC: 4.1 MMOL/L (ref 3.5–5.1)
PROT SERPL-MCNC: 6.3 G/DL (ref 6–8.4)
RBC # BLD AUTO: 3.01 M/UL (ref 4–5.4)
SARS-COV-2 RDRP RESP QL NAA+PROBE: NEGATIVE
SODIUM SERPL-SCNC: 142 MMOL/L (ref 136–145)
TACROLIMUS BLD-MCNC: 4.3 NG/ML (ref 5–15)
WBC # BLD AUTO: 10.75 K/UL (ref 3.9–12.7)

## 2020-10-14 PROCEDURE — 97803 MED NUTRITION INDIV SUBSEQ: CPT

## 2020-10-14 PROCEDURE — U0002 COVID-19 LAB TEST NON-CDC: HCPCS

## 2020-10-14 PROCEDURE — 27000221 HC OXYGEN, UP TO 24 HOURS

## 2020-10-14 PROCEDURE — 83735 ASSAY OF MAGNESIUM: CPT

## 2020-10-14 PROCEDURE — 25000003 PHARM REV CODE 250: Performed by: STUDENT IN AN ORGANIZED HEALTH CARE EDUCATION/TRAINING PROGRAM

## 2020-10-14 PROCEDURE — A4216 STERILE WATER/SALINE, 10 ML: HCPCS | Performed by: SURGERY

## 2020-10-14 PROCEDURE — 85025 COMPLETE CBC W/AUTO DIFF WBC: CPT

## 2020-10-14 PROCEDURE — 80053 COMPREHEN METABOLIC PANEL: CPT

## 2020-10-14 PROCEDURE — 25000003 PHARM REV CODE 250: Performed by: SURGERY

## 2020-10-14 PROCEDURE — 25000003 PHARM REV CODE 250

## 2020-10-14 PROCEDURE — 63600175 PHARM REV CODE 636 W HCPCS: Performed by: STUDENT IN AN ORGANIZED HEALTH CARE EDUCATION/TRAINING PROGRAM

## 2020-10-14 PROCEDURE — 84100 ASSAY OF PHOSPHORUS: CPT

## 2020-10-14 PROCEDURE — 94761 N-INVAS EAR/PLS OXIMETRY MLT: CPT

## 2020-10-14 PROCEDURE — 99900035 HC TECH TIME PER 15 MIN (STAT)

## 2020-10-14 PROCEDURE — 63600175 PHARM REV CODE 636 W HCPCS: Performed by: SURGERY

## 2020-10-14 PROCEDURE — C9113 INJ PANTOPRAZOLE SODIUM, VIA: HCPCS | Performed by: STUDENT IN AN ORGANIZED HEALTH CARE EDUCATION/TRAINING PROGRAM

## 2020-10-14 PROCEDURE — 94664 DEMO&/EVAL PT USE INHALER: CPT

## 2020-10-14 PROCEDURE — 80197 ASSAY OF TACROLIMUS: CPT

## 2020-10-14 RX ORDER — PROMETHAZINE HYDROCHLORIDE 12.5 MG/1
25 TABLET ORAL EVERY 6 HOURS PRN
Status: CANCELLED | OUTPATIENT
Start: 2020-10-14

## 2020-10-14 RX ORDER — ONDANSETRON 4 MG/1
4 TABLET, FILM COATED ORAL EVERY 6 HOURS PRN
Status: CANCELLED | OUTPATIENT
Start: 2020-10-14

## 2020-10-14 RX ORDER — SODIUM CHLORIDE 0.9 % (FLUSH) 0.9 %
10 SYRINGE (ML) INJECTION
Status: CANCELLED | OUTPATIENT
Start: 2020-10-14

## 2020-10-14 RX ORDER — MAGNESIUM SULFATE HEPTAHYDRATE 40 MG/ML
2 INJECTION, SOLUTION INTRAVENOUS ONCE
Status: COMPLETED | OUTPATIENT
Start: 2020-10-14 | End: 2020-10-14

## 2020-10-14 RX ORDER — ACETAMINOPHEN 650 MG/1
650 SUPPOSITORY RECTAL EVERY 6 HOURS PRN
Status: CANCELLED | OUTPATIENT
Start: 2020-10-14

## 2020-10-14 RX ORDER — OXYCODONE HYDROCHLORIDE 5 MG/1
5 TABLET ORAL EVERY 4 HOURS PRN
Status: CANCELLED | OUTPATIENT
Start: 2020-10-14

## 2020-10-14 RX ORDER — TACROLIMUS 0.5 MG/1
0.5 CAPSULE ORAL 2 TIMES DAILY
Status: CANCELLED | OUTPATIENT
Start: 2020-10-14

## 2020-10-14 RX ORDER — TACROLIMUS 0.5 MG/1
0.5 CAPSULE ORAL 2 TIMES DAILY
Status: DISCONTINUED | OUTPATIENT
Start: 2020-10-14 | End: 2020-10-14 | Stop reason: HOSPADM

## 2020-10-14 RX ORDER — BISACODYL 10 MG
10 SUPPOSITORY, RECTAL RECTAL DAILY
Status: CANCELLED | OUTPATIENT
Start: 2020-10-15

## 2020-10-14 RX ORDER — INSULIN ASPART 100 [IU]/ML
1-10 INJECTION, SOLUTION INTRAVENOUS; SUBCUTANEOUS EVERY 4 HOURS PRN
Status: CANCELLED | OUTPATIENT
Start: 2020-10-14

## 2020-10-14 RX ORDER — IPRATROPIUM BROMIDE AND ALBUTEROL SULFATE 2.5; .5 MG/3ML; MG/3ML
3 SOLUTION RESPIRATORY (INHALATION) EVERY 4 HOURS PRN
Status: CANCELLED | OUTPATIENT
Start: 2020-10-14

## 2020-10-14 RX ORDER — LEVOTHYROXINE SODIUM ANHYDROUS 100 UG/5ML
40 INJECTION, POWDER, LYOPHILIZED, FOR SOLUTION INTRAVENOUS DAILY
Status: CANCELLED | OUTPATIENT
Start: 2020-10-15

## 2020-10-14 RX ORDER — PANTOPRAZOLE SODIUM 40 MG/1
40 TABLET, DELAYED RELEASE ORAL DAILY
Status: CANCELLED | OUTPATIENT
Start: 2020-10-14

## 2020-10-14 RX ORDER — GLUCAGON 1 MG
1 KIT INJECTION
Status: CANCELLED | OUTPATIENT
Start: 2020-10-14

## 2020-10-14 RX ORDER — SODIUM CHLORIDE 0.9 % (FLUSH) 0.9 %
10 SYRINGE (ML) INJECTION EVERY 6 HOURS
Status: CANCELLED | OUTPATIENT
Start: 2020-10-14

## 2020-10-14 RX ORDER — HEPARIN SODIUM 5000 [USP'U]/ML
5000 INJECTION, SOLUTION INTRAVENOUS; SUBCUTANEOUS EVERY 8 HOURS
Status: CANCELLED | OUTPATIENT
Start: 2020-10-14

## 2020-10-14 RX ORDER — METOCLOPRAMIDE 5 MG/1
10 TABLET ORAL
Status: CANCELLED | OUTPATIENT
Start: 2020-10-14

## 2020-10-14 RX ORDER — POLYETHYLENE GLYCOL 3350 17 G/17G
17 POWDER, FOR SOLUTION ORAL DAILY
Status: CANCELLED | OUTPATIENT
Start: 2020-10-15

## 2020-10-14 RX ADMIN — Medication 10 ML: at 06:10

## 2020-10-14 RX ADMIN — TACROLIMUS 0.5 MG: 0.5 CAPSULE ORAL at 10:10

## 2020-10-14 RX ADMIN — Medication 10 ML: at 12:10

## 2020-10-14 RX ADMIN — HEPARIN SODIUM 5000 UNITS: 5000 INJECTION INTRAVENOUS; SUBCUTANEOUS at 05:10

## 2020-10-14 RX ADMIN — MAGNESIUM SULFATE 2 G: 2 INJECTION INTRAVENOUS at 08:10

## 2020-10-14 RX ADMIN — METOCLOPRAMIDE 10 MG: 5 INJECTION, SOLUTION INTRAMUSCULAR; INTRAVENOUS at 05:10

## 2020-10-14 RX ADMIN — OXYCODONE HYDROCHLORIDE 5 MG: 5 TABLET ORAL at 05:10

## 2020-10-14 RX ADMIN — PANTOPRAZOLE SODIUM 40 MG: 40 INJECTION, POWDER, LYOPHILIZED, FOR SOLUTION INTRAVENOUS at 08:10

## 2020-10-14 RX ADMIN — LEVOTHYROXINE SODIUM ANHYDROUS 40 MCG: 100 INJECTION, POWDER, LYOPHILIZED, FOR SOLUTION INTRAVENOUS at 08:10

## 2020-10-14 NOTE — TREATMENT PLAN
Hepatology Treatment Plan    Elda Hernandez is a 50 y.o. female admitted to hospital 9/3/2020 (Hospital Day: 42) due to Perforated abdominal viscus. Hepatology following for immunosuppression management.    Lab Results   Component Value Date    TACROLIMUS 4.3 (L) 10/14/2020    TACROLIMUS 4.2 (L) 10/13/2020    TACROLIMUS 6.6 10/12/2020       Lab Results   Component Value Date    CREATININE 0.6 10/14/2020    CREATININE 0.6 10/13/2020    CREATININE 0.6 10/12/2020       Lab Results   Component Value Date    ALT 55 (H) 10/14/2020    AST 80 (H) 10/14/2020     (H) 04/07/2018    ALKPHOS 538 (H) 10/14/2020    BILITOT 4.1 (H) 10/14/2020    WBC 10.75 10/14/2020    HGB 8.5 (L) 10/14/2020     10/14/2020       Kidney function is stable  Liver enzymes are improving    Plan  - restart 0.5 BID of prograf    Please notify hepatology on day of discharge to discuss discharge medications and follow up.     Please obtain daily CBC, BMP, LFT, INR, immunosuppressant trough level    Thank you for involving us in the care of Elda Hernandez. Please call with any additional concerns or questions.    Wayne Patel MD  Gastroenterology Fellow PGY V   [facility]

## 2020-10-14 NOTE — SUBJECTIVE & OBJECTIVE
Interval History: NAEO, VSS. Up to chair yesterday, otherwise minimal ambulation.     Medications:  Continuous Infusions:   TPN ADULT CENTRAL LINE CUSTOM 60 mL/hr at 10/13/20 2224     Scheduled Meds:   bisacodyL  10 mg Rectal Daily    heparin (porcine)  5,000 Units Subcutaneous Q8H    levothyroxine  40 mcg Intravenous Daily    lipid (SMOFLIPID)  250 mL Intravenous Daily    metoclopramide HCl  10 mg Intravenous Q8H    pantoprazole  40 mg Intravenous Daily    polyethylene glycol  17 g Oral Daily    potassium chloride  20 mEq Oral Once    sodium chloride 0.9%  10 mL Intravenous Q6H    tacrolimus  0.5 mg Oral BID     PRN Meds:sodium chloride, acetaminophen, albuterol-ipratropium, dextrose 50%, dextrose 50%, enalaprilat, glucagon (human recombinant), insulin aspart U-100, iohexol, ondansetron, oxyCODONE, promethazine, sodium chloride 0.9%, Flushing PICC Protocol **AND** sodium chloride 0.9% **AND** sodium chloride 0.9%     Review of patient's allergies indicates:   Allergen Reactions    Codeine Itching     Other reaction(s): Itching    Lipitor [atorvastatin] Other (See Comments)     Other reaction(s): Muscle pain  Muscle cranmps    Morphine Itching     Other reaction(s): nausea and vomiting     Zoloft [sertraline] Other (See Comments)     Tremors/muscle spasms     Objective:     Vital Signs (Most Recent):  Temp: 99.7 °F (37.6 °C) (10/14/20 0847)  Pulse: 102 (10/14/20 0939)  Resp: 18 (10/14/20 0939)  BP: (!) 158/70 (10/14/20 0847)  SpO2: 96 % (10/14/20 0939) Vital Signs (24h Range):  Temp:  [98.8 °F (37.1 °C)-99.9 °F (37.7 °C)] 99.7 °F (37.6 °C)  Pulse:  [] 102  Resp:  [16-18] 18  SpO2:  [94 %-96 %] 96 %  BP: (142-161)/(65-76) 158/70     Weight: 67.8 kg (149 lb 7.6 oz)  Body mass index is 30.19 kg/m².    Intake/Output - Last 3 Shifts       10/12 0700 - 10/13 0659 10/13 0700 - 10/14 0659 10/14 0700 - 10/15 0659    P.O.       Blood 957.5      IV Piggyback       .4 372 323.2    Total Intake(mL/kg)  1567.9 (23.1) 372 (5.5) 323.2 (4.8)    Urine (mL/kg/hr) 900 (0.6) 800 (0.5)     Emesis/NG output  0     Other  0     Stool 0 0     Total Output 900 800     Net +667.9 -428 +323.2           Urine Occurrence  1 x     Stool Occurrence 0 x 0 x     Emesis Occurrence  0 x           Physical Exam  Vitals signs and nursing note reviewed.   Constitutional:       General: She is not in acute distress.  Cardiovascular:      Rate and Rhythm: Normal rate.      Pulses: Normal pulses.      Comments: Pitting ankle edema bilaterally   Pulmonary:      Effort: Pulmonary effort is normal. No respiratory distress.   Abdominal:      General: Abdomen is flat. There is no distension.      Tenderness: There is no abdominal tenderness.   Neurological:      Mental Status: She is alert.         Significant Labs:  CBC:   Recent Labs   Lab 10/14/20  0500   WBC 10.75   RBC 3.01*   HGB 8.5*   HCT 28.7*      MCV 95   MCH 28.2   MCHC 29.6*     BMP:   Recent Labs   Lab 10/14/20  0500         K 4.1      CO2 26   BUN 29*   CREATININE 0.6   CALCIUM 8.5*   MG 1.5*     CMP:   Recent Labs   Lab 10/14/20  0500      CALCIUM 8.5*   ALBUMIN 1.4*   PROT 6.3      K 4.1   CO2 26      BUN 29*   CREATININE 0.6   ALKPHOS 538*   ALT 55*   AST 80*   BILITOT 4.1*       Significant Diagnostics:  I have reviewed all pertinent imaging results/findings within the past 24 hours.

## 2020-10-14 NOTE — PLAN OF CARE
Recommendations    Pt meets acute moderate malnutrition criteria, improving.      1. Continue current TPN + SMOF lipids - meeting needs.      2. Recommend adding Boost Plus BID with meals.     3. As medically able, ADAT to low fiber with texture per SLP.      4. RD following.     Goals: continue to meet >75% EEN/EPN by RD follow-up  Nutrition Goal Status: goal met

## 2020-10-14 NOTE — PLAN OF CARE
10/14/20 1116   Post-Acute Status   Post-Acute Authorization Placement   Post-Acute Placement Status Set-up Complete     SW completed transportation for  in one hour 12:15p.m.  to [facility] via stretcher.      JESSICA notified RN to call report to Staci at [phone].  Ambulance packet brought to nurse station.        Flory Yeung LMSW  PRN-  [facility]

## 2020-10-14 NOTE — ASSESSMENT & PLAN NOTE
50 y.o. female w/ free air on CT scan. S/p emergent ex lap on 9/4 w/ findings of gastric perforation, primary repair. Now w/ abdominal closure on 9/6, skin stapled closed, KERRY drains removed. Class A to OR on 9/18 for free air and extrav or oral contrast seen on CT. CT on 9/25 redemonstrated contrast leaking from stomach, but well-drained with surgical drain.     Fulls/TPN  Continue reglan for Hx gastroparesis  Clamp G tube, can vent as needed for nausea  Bowel regimen   Diuresis today for dependent edema   Continue local wound care to midline abdominal incision, wet to dry dressing changes at least daily  PT/OT  Will speak with physical therapy about targeted exercises to improve mobility of bilateral lower extremities    Dispo: Discharge planning, needs LTAC for TPN administration.
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
## 2020-10-14 NOTE — PROGRESS NOTES
"[facility]  Adult Nutrition  Progress Note    SUMMARY       Recommendations    Pt meets acute moderate malnutrition criteria, improving.      1. Continue current TPN + SMOF lipids - meeting needs.      2. Recommend adding Boost Plus BID with meals.     3. As medically able, ADAT to low fiber with texture per SLP.      4. RD following.     Goals: continue to meet >75% EEN/EPN by RD follow-up  Nutrition Goal Status: goal met  Communication of RD Recs: (POC)    Reason for Assessment    Reason For Assessment: RD follow-up  Diagnosis: (gastric outlet obstruction)  Relevant Medical History: Esophageal stricture; HTN; Liver fibrosis; Seizures; SCC; HTN; S/p liver tx  Interdisciplinary Rounds: did not attend  General Information Comments: Diet advanced to full liquids, TPN + SMOF lipids infusing. Pt sleeping in bed with no family members at bedside. Per chart review, pt eating 25% of meals. No ONS ordereded. NFPE completed 9/6, pt with mild muscle/fat wasting. Pt meets acute moderate malnutrition criteria.  Nutrition Discharge Planning: unable to determine    Nutrition Risk Screen    Nutrition Risk Screen: tube feeding or parenteral nutrition    Nutrition/Diet History    Spiritual, Cultural Beliefs, Scientology Practices, Values that Affect Care: no  Food Allergies: NKFA  Factors Affecting Nutritional Intake: altered gastrointestinal function(full liquid diet)    Anthropometrics    Temp: 99.7 °F (37.6 °C)  Height Method: Stated  Height: 4' 11" (149.9 cm)  Height (inches): 59 in  Weight Method: Bed Scale  Weight: 67.8 kg (149 lb 7.6 oz)  Weight (lb): 149.47 lb  Ideal Body Weight (IBW), Female: 95 lb  % Ideal Body Weight, Female (lb): 156.84 %  BMI (Calculated): 30.2  BMI Grade: 25 - 29.9 - overweight     Lab/Procedures/Meds    Pertinent Labs Reviewed: reviewed  Pertinent Labs Comments: BUN 29, alk phos 538, T bili 4.1, AST 80, ALT 55  Pertinent Medications Reviewed: reviewed  Pertinent Medications Comments: " lasix, polyethylene glycol    Estimated/Assessed Needs    Weight Used For Calorie Calculations: 58 kg (127 lb 13.9 oz)  Energy Calorie Requirements (kcal): 1400  Energy Need Method: [place]-St Jeor(x1.3PAL)  Protein Requirements: 67-84 gm (1.2-1.5gm/kg)  Weight Used For Protein Calculations: 56 kg (123 lb 7.3 oz)(UBW)  Fluid Requirements (mL): 1 mL/kcal or per MD     RDA Method (mL): 1400     Nutrition Prescription Ordered    Current Diet Order: Full Liquids  Current Nutrition Support Formula Ordered: (Custom TPN 85 gm AA / 165 gm Dex + IV lipids)  Current Nutrition Support Rate Ordered: 60 (ml)  Current Nutrition Support Frequency Ordered: mL/hr    Evaluation of Received Nutrient/Fluid Intake    Parenteral Calories (kcal): 901  Parenteral Protein (gm): 85  Parenteral Fluid (mL): 1440  Lipid Calories (kcals): 500 kcals  GIR (Glucose Infusion Rate) (mg/kg/min): 2.04 mg/kg/min  Total Calories (kcal): 1401  % Kcal Needs: 100  % Protein Needs: 115  Energy Calories Required: meeting needs  Protein Required: exceeds needs  Fluid Required: (per MD)  Comments: LBM 10/11  Tolerance: tolerating  % Intake of Estimated Energy Needs: 75 - 100 %  % Meal Intake: 0 - 25 %    Nutrition Risk    Level of Risk/Frequency of Follow-up: (f/u 1 x wk)     Assessment and Plan    Moderate Protein-Calorie Malnutrition  Malnutrition in the context of Acute Illness/Injury     Related to (etiology):  Decreased intake      Signs and Symptoms (as evidenced by):  Energy Intake: <50% of estimated energy requirement for >3 weeks   Muscle Mass Depletion: mild depletion of temples   Weight Loss: 11% x 3 weeks per family      Interventions (treatment strategy):  Collaboration of care with other providers  Parenteral nutrition      Nutrition Diagnosis Status:  Continues, Improving       Monitor and Evaluation    Food and Nutrient Intake: energy intake, food and beverage intake, parenteral nutrition intake  Food and Nutrient Adminstration: diet order,  enteral and parenteral nutrition administration  Knowledge/Beliefs/Attitudes: food and nutrition knowledge/skill  Physical Activity and Function: nutrition-related ADLs and IADLs  Anthropometric Measurements: weight, weight change  Biochemical Data, Medical Tests and Procedures: electrolyte and renal panel, gastrointestinal profile, glucose/endocrine profile, inflammatory profile, lipid profile  Nutrition-Focused Physical Findings: overall appearance     Malnutrition Assessment  Malnutrition Type: acute illness or injury          Weight Loss (Malnutrition): (11% x 3 weeks)  Energy Intake (Malnutrition): less than or equal to 50% for greater than or equal to 1 month   Orbital Region (Subcutaneous Fat Loss): mild depletion  Upper Arm Region (Subcutaneous Fat Loss): well nourished  Thoracic and Lumbar Region: well nourished   Amish Region (Muscle Loss): moderate depletion  Clavicle Bone Region (Muscle Loss): mild depletion  Clavicle and Acromion Bone Region (Muscle Loss): well nourished  Patellar Region (Muscle Loss): well nourished  Anterior Thigh Region (Muscle Loss): well nourished  Posterior Calf Region (Muscle Loss): well nourished   Edema (Fluid Accumulation): 2-->mild(lower extremities)             Nutrition Follow-Up    RD Follow-up?: Yes

## 2020-10-14 NOTE — PLAN OF CARE
Plan of care reviewed with patient who demonstrated understanding. Pt. AAOx4, VSS on 4 liters nasal cannula. Abdominal incision, and KERRY insertion sites,  clean dry and intact. G- tube clamped with no complaints of nausea or vomiting. TPN and lipids infusing per MAR. Pt. Complains of mild to moderate pain that is controlled with prn pain medication. Patient turned/ repositioned q2. Bed in low position, bed rails x2, call light within reach, frequent monitoring continued.

## 2020-10-14 NOTE — NURSING
Pt updated on transport time and states she was not aware of discharging today or going to SNF. States she does not know what facility it is and her  does not know about the transfer. Called pt  in room, pt states he was notified this morning by Senait but had not been notified by [facility] staff. Pt states she will not leave until  is here, transport is scheduled for 12:15. Notified JESSICA Boogie.       Also unsuccessful attempt to call report to SNF. Will attempt later.

## 2020-10-14 NOTE — PLAN OF CARE
POC reviewed with pt, AAOx4, VSS except tachy and hypertensive. 3.5 L NC. Midline ab incision. At top of incision, pt has foam dressing. 3 old KERRY puncture sites on L side, L skin tear with foam, L thigh blistering with foam. R PICC with TPN at 60. Full liquid diet. No bm. Purewick. Accucheck q 6, WDL. Denies pain. Cont pulse ox. Edematous.    D/c today to SNF.  at bedside before transport. Asked MD if pt Tpn can be stopped for transport. Stated if pt can get stat labs and glucose check when arriving at SNF, then ok to pause. Facility states they are unable to obtain stat labs when the pt arrives. Notified charge Rn and manager, ok to allow pt to transport on pump. EMS will return IV pump. Report called to Staci at number provided in SW note.     Transported via stretcher.

## 2020-10-14 NOTE — PLAN OF CARE
[facility] accepted patient and will have an RN from the LTAC to staff patient for the TPN.  Patient discharged via ambulance with oxygen and TPN.   took all belongings home.  Follow up appointment letter placed in transfer packet.      Future Appointments   Date Time Provider Department Center   10/29/2020 10:30 AM Clark Rodriguez MD [facility]          10/14/20 6946   Final Note   Assessment Type Final Discharge Note   Anticipated Discharge Disposition SNF   Hospital Follow Up  Appt(s) scheduled? Yes   Right Care Referral Info   Post Acute Recommendation SNF / Sub-Acute Rehab   Post-Acute Status   Post-Acute Authorization Placement   Post-Acute Placement Status Set-up Complete

## 2020-10-14 NOTE — PROGRESS NOTES
[facility]  General Surgery  Progress Note    Subjective:     History of Present Illness:  Ms. Hernandez is a 51yo F w/PMH of von Gierke disease s/p liver transplant (2002, on tacro + MMF, follows Dr. Nielsen), hx chronic rejection (bx 2015 with acute and chronic rejection s/p steroids), biliary stricture and ductopenic rejection, recently with cirrhosis on fibroscan (10/2019), HTN, and depression who presents as a transfer for further evaluation of gastric outlet obstruction and esophageal stricturing.  Patient decompensated requiring multiple pressors and intubation. CT scan showed free air. Prior to intubation patient reported severe abdominal pain.   states that patient has had epigastric pain, nausea, and vomiting for several days.     Post-Op Info:  Procedure(s) (LRB):  LAPAROTOMY, EXPLORATORY, DRAINAGE OF ABSCESS, REPAIR OF GASTRIC PERFORATION, GASTROSTOMY TUBE PLACEMENT (N/A)  APPLICATION, WOUND VAC (N/A)   26 Days Post-Op     Interval History: NAEO, VSS. Up to chair yesterday, otherwise minimal ambulation.     Medications:  Continuous Infusions:   TPN ADULT CENTRAL LINE CUSTOM 60 mL/hr at 10/13/20 2224     Scheduled Meds:   bisacodyL  10 mg Rectal Daily    heparin (porcine)  5,000 Units Subcutaneous Q8H    levothyroxine  40 mcg Intravenous Daily    lipid (SMOFLIPID)  250 mL Intravenous Daily    metoclopramide HCl  10 mg Intravenous Q8H    pantoprazole  40 mg Intravenous Daily    polyethylene glycol  17 g Oral Daily    potassium chloride  20 mEq Oral Once    sodium chloride 0.9%  10 mL Intravenous Q6H    tacrolimus  0.5 mg Oral BID     PRN Meds:sodium chloride, acetaminophen, albuterol-ipratropium, dextrose 50%, dextrose 50%, enalaprilat, glucagon (human recombinant), insulin aspart U-100, iohexol, ondansetron, oxyCODONE, promethazine, sodium chloride 0.9%, Flushing PICC Protocol **AND** sodium chloride 0.9% **AND** sodium chloride 0.9%     Review of patient's allergies  indicates:   Allergen Reactions    Codeine Itching     Other reaction(s): Itching    Lipitor [atorvastatin] Other (See Comments)     Other reaction(s): Muscle pain  Muscle cranmps    Morphine Itching     Other reaction(s): nausea and vomiting     Zoloft [sertraline] Other (See Comments)     Tremors/muscle spasms     Objective:     Vital Signs (Most Recent):  Temp: 99.7 °F (37.6 °C) (10/14/20 0847)  Pulse: 102 (10/14/20 0939)  Resp: 18 (10/14/20 0939)  BP: (!) 158/70 (10/14/20 0847)  SpO2: 96 % (10/14/20 0939) Vital Signs (24h Range):  Temp:  [98.8 °F (37.1 °C)-99.9 °F (37.7 °C)] 99.7 °F (37.6 °C)  Pulse:  [] 102  Resp:  [16-18] 18  SpO2:  [94 %-96 %] 96 %  BP: (142-161)/(65-76) 158/70     Weight: 67.8 kg (149 lb 7.6 oz)  Body mass index is 30.19 kg/m².    Intake/Output - Last 3 Shifts       10/12 0700 - 10/13 0659 10/13 0700 - 10/14 0659 10/14 0700 - 10/15 0659    P.O.       Blood 957.5      IV Piggyback       .4 372 323.2    Total Intake(mL/kg) 1567.9 (23.1) 372 (5.5) 323.2 (4.8)    Urine (mL/kg/hr) 900 (0.6) 800 (0.5)     Emesis/NG output  0     Other  0     Stool 0 0     Total Output 900 800     Net +667.9 -428 +323.2           Urine Occurrence  1 x     Stool Occurrence 0 x 0 x     Emesis Occurrence  0 x           Physical Exam  Vitals signs and nursing note reviewed.   Constitutional:       General: She is not in acute distress.  Cardiovascular:      Rate and Rhythm: Normal rate.      Pulses: Normal pulses.      Comments: Pitting ankle edema bilaterally   Pulmonary:      Effort: Pulmonary effort is normal. No respiratory distress.   Abdominal:      General: Abdomen is flat. There is no distension.      Tenderness: There is no abdominal tenderness.   Neurological:      Mental Status: She is alert.         Significant Labs:  CBC:   Recent Labs   Lab 10/14/20  0500   WBC 10.75   RBC 3.01*   HGB 8.5*   HCT 28.7*      MCV 95   MCH 28.2   MCHC 29.6*     BMP:   Recent Labs   Lab 10/14/20  0500          K 4.1      CO2 26   BUN 29*   CREATININE 0.6   CALCIUM 8.5*   MG 1.5*     CMP:   Recent Labs   Lab 10/14/20  0500      CALCIUM 8.5*   ALBUMIN 1.4*   PROT 6.3      K 4.1   CO2 26      BUN 29*   CREATININE 0.6   ALKPHOS 538*   ALT 55*   AST 80*   BILITOT 4.1*       Significant Diagnostics:  I have reviewed all pertinent imaging results/findings within the past 24 hours.    Assessment/Plan:     * Perforated abdominal viscus  50 y.o. female w/ free air on CT scan. S/p emergent ex lap on 9/4 w/ findings of gastric perforation, primary repair. Now w/ abdominal closure on 9/6, skin stapled closed, KERRY drains removed. Class A to OR on 9/18 for free air and extrav or oral contrast seen on CT. CT on 9/25 redemonstrated contrast leaking from stomach, but well-drained with surgical drain.     Fulls/TPN  Continue reglan for Hx gastroparesis  Hepatology following, recommend resume prograf 0.5 BID  Clamp G tube, can vent as needed for nausea  Bowel regimen   Diuresis today for dependent edema   Continue local wound care to midline abdominal incision, wet to dry dressing changes at least daily  PT/OT  Will speak with physical therapy about targeted exercises to improve mobility of bilateral lower extremities    Dispo: Discharge planning, needs LTAC for TPN administration.         Yasmine James MD  General Surgery  [facility]

## 2020-10-15 PROBLEM — E43 SEVERE PROTEIN-CALORIE MALNUTRITION: Status: ACTIVE | Noted: 2020-10-15

## 2020-10-15 PROBLEM — R60.9 EDEMA: Status: ACTIVE | Noted: 2020-10-15

## 2020-10-15 PROBLEM — M62.81 GENERALIZED MUSCLE WEAKNESS: Status: ACTIVE | Noted: 2020-10-15

## 2020-10-15 PROBLEM — K31.84 GASTROPARESIS: Status: ACTIVE | Noted: 2020-10-15

## 2020-10-19 PROBLEM — R60.1 ANASARCA: Status: ACTIVE | Noted: 2020-10-15

## 2020-10-19 LAB — FUNGUS SPEC CULT: ABNORMAL

## 2020-10-27 ENCOUNTER — PATIENT MESSAGE (OUTPATIENT)
Dept: SURGERY | Facility: CLINIC | Age: 50
End: 2020-10-27

## 2020-10-27 PROBLEM — R63.0 POOR APPETITE: Status: ACTIVE | Noted: 2020-10-27

## 2020-10-29 PROBLEM — R18.8 ASCITES: Status: ACTIVE | Noted: 2020-10-29

## 2020-10-29 PROBLEM — J90 PLEURAL EFFUSION: Status: ACTIVE | Noted: 2020-10-29

## 2020-11-20 ENCOUNTER — PATIENT MESSAGE (OUTPATIENT)
Dept: SURGERY | Facility: CLINIC | Age: 50
End: 2020-11-20

## 2020-11-23 ENCOUNTER — PATIENT MESSAGE (OUTPATIENT)
Dept: SURGERY | Facility: CLINIC | Age: 50
End: 2020-11-23

## 2020-11-24 ENCOUNTER — TELEPHONE (OUTPATIENT)
Dept: FAMILY MEDICINE | Facility: CLINIC | Age: 50
End: 2020-11-24

## 2020-11-24 NOTE — TELEPHONE ENCOUNTER
----- Message from Maddie Montero sent at 11/24/2020 12:58 PM CST -----  Regarding: Requesting an NP appt  Pt  Kody  called to schedule an NP physical to establish care. Pt was discharged from the hospital for stomach issues and is a transplant pt.  Really wants to schedule with you.    Kody can be reached at [phone]    Thank you!    Would not pull up for me sorry

## 2020-12-01 ENCOUNTER — OFFICE VISIT (OUTPATIENT)
Dept: TRANSPLANT | Facility: CLINIC | Age: 50
End: 2020-12-01
Attending: INTERNAL MEDICINE
Payer: MEDICARE

## 2020-12-01 VITALS
BODY MASS INDEX: 23.6 KG/M2 | OXYGEN SATURATION: 97 % | HEART RATE: 87 BPM | RESPIRATION RATE: 16 BRPM | SYSTOLIC BLOOD PRESSURE: 122 MMHG | DIASTOLIC BLOOD PRESSURE: 56 MMHG | WEIGHT: 116.88 LBS

## 2020-12-01 DIAGNOSIS — Z94.4 S/P LIVER TRANSPLANT: Chronic | ICD-10-CM

## 2020-12-01 DIAGNOSIS — Z29.89 PROPHYLACTIC IMMUNOTHERAPY: Primary | Chronic | ICD-10-CM

## 2020-12-01 DIAGNOSIS — R19.8 PERFORATED ABDOMINAL VISCUS: ICD-10-CM

## 2020-12-01 DIAGNOSIS — T86.41 CHRONIC REJECTION OF LIVER TRANSPLANT: ICD-10-CM

## 2020-12-01 PROCEDURE — 3074F PR MOST RECENT SYSTOLIC BLOOD PRESSURE < 130 MM HG: ICD-10-PCS | Mod: S$GLB,,, | Performed by: INTERNAL MEDICINE

## 2020-12-01 PROCEDURE — 3008F PR BODY MASS INDEX (BMI) DOCUMENTED: ICD-10-PCS | Mod: S$GLB,,, | Performed by: INTERNAL MEDICINE

## 2020-12-01 PROCEDURE — 99999 PR PBB SHADOW E&M-EST. PATIENT-LVL IV: ICD-10-PCS | Mod: PBBFAC,,, | Performed by: INTERNAL MEDICINE

## 2020-12-01 PROCEDURE — 3008F BODY MASS INDEX DOCD: CPT | Mod: S$GLB,,, | Performed by: INTERNAL MEDICINE

## 2020-12-01 PROCEDURE — 3078F PR MOST RECENT DIASTOLIC BLOOD PRESSURE < 80 MM HG: ICD-10-PCS | Mod: S$GLB,,, | Performed by: INTERNAL MEDICINE

## 2020-12-01 PROCEDURE — 99213 PR OFFICE/OUTPT VISIT, EST, LEVL III, 20-29 MIN: ICD-10-PCS | Mod: S$GLB,,, | Performed by: INTERNAL MEDICINE

## 2020-12-01 PROCEDURE — 3074F SYST BP LT 130 MM HG: CPT | Mod: S$GLB,,, | Performed by: INTERNAL MEDICINE

## 2020-12-01 PROCEDURE — 1126F PR PAIN SEVERITY QUANTIFIED, NO PAIN PRESENT: ICD-10-PCS | Mod: S$GLB,,, | Performed by: INTERNAL MEDICINE

## 2020-12-01 PROCEDURE — 1126F AMNT PAIN NOTED NONE PRSNT: CPT | Mod: S$GLB,,, | Performed by: INTERNAL MEDICINE

## 2020-12-01 PROCEDURE — 99213 OFFICE O/P EST LOW 20 MIN: CPT | Mod: S$GLB,,, | Performed by: INTERNAL MEDICINE

## 2020-12-01 PROCEDURE — 3078F DIAST BP <80 MM HG: CPT | Mod: S$GLB,,, | Performed by: INTERNAL MEDICINE

## 2020-12-01 PROCEDURE — 99999 PR PBB SHADOW E&M-EST. PATIENT-LVL IV: CPT | Mod: PBBFAC,,, | Performed by: INTERNAL MEDICINE

## 2020-12-01 RX ORDER — NYSTATIN 100000 [USP'U]/ML
4 SUSPENSION ORAL
Qty: 160 ML | Refills: 1 | Status: SHIPPED | OUTPATIENT
Start: 2020-12-01 | End: 2020-12-11

## 2020-12-01 NOTE — PROGRESS NOTES
Transplant Hepatology  Liver Transplant Recipient Follow-up    Transplant Date: 9/23/2002  UNOS Native Liver Dx: METDIS: Glycogen Storage Disease Type I (GSD-I)    Elda Doran is here for follow up of her liver transplant.    ORGAN: LIVER  Whole or Partial: whole liver  Donor Type:   CDC High Risk:   Donor CMV Status: positive  Donor HCV Status: negative  Donor HBcAb: negative  Biliary Anastomosis:   Arterial Anatomy:   IVC reconstruction:   Portal vein status:       She has had the following complications since transplant: biliary stricture and chronic rejection. The noted complications needs to be addressed more thoroughly today.    Subjective:     Interval History:   This 50-year-old woman is 18 years status post liver transplant for glycogen storage disease.  She has had both a biliary stricture and chronic rejection with some degree of hepatic fibrosis posttransplant.  She was discharged from hospital 2 weeks ago following a very lengthy admission for perforated viscus following a gastric outlet obstruction.  She has a healing midline incision and a percutaneous drain.  Overall she feels like she is getting stronger.  Her appetite is improving.  She has no symptoms of hepatic decompensation.  She is to be assessed by General surgery tomorrow.  Review of Systems   Constitutional: Negative for appetite change, fatigue and unexpected weight change.   HENT: Negative for ear pain, hearing loss, sore throat and trouble swallowing.    Eyes: Negative for visual disturbance.   Respiratory: Negative for cough and shortness of breath.    Cardiovascular: Negative for chest pain and palpitations.   Gastrointestinal: Negative for abdominal pain, nausea and vomiting.   Genitourinary: Negative for difficulty urinating and dysuria.   Musculoskeletal: Negative for arthralgias.   Skin: Negative for rash.             Neurological: Negative for tremors, seizures and headaches.   Psychiatric/Behavioral: Negative for agitation and  decreased concentration.       Objective:     Physical Exam   Constitutional: She is oriented to person, place, and time. She appears well-developed and well-nourished.   HENT:   Right Ear: External ear normal.   Left Ear: External ear normal.   Mouth/Throat: Oropharynx is clear and moist.   Eyes: No scleral icterus.   Cardiovascular: Normal rate, regular rhythm and normal heart sounds. Exam reveals no gallop and no friction rub.   No murmur heard.  Pulmonary/Chest: Effort normal and breath sounds normal. No respiratory distress. She has no wheezes.   Abdominal: Soft. Bowel sounds are normal. She exhibits no distension and no mass. There is no abdominal tenderness.       Musculoskeletal: Normal range of motion.         General: No edema.   Lymphadenopathy:     She has no cervical adenopathy.   Neurological: She is alert and oriented to person, place, and time. She has normal strength.   Skin: Skin is warm, dry and intact. She is not diaphoretic. No pallor.     Lab Results   Component Value Date    BILITOT 2.1 (H) 11/17/2020    AST 67 (H) 11/17/2020    ALT 70 (H) 11/17/2020    ALKPHOS 848 (H) 11/17/2020    CREATININE 0.8 11/18/2020    ALBUMIN 2.3 (L) 11/17/2020     Lab Results   Component Value Date    WBC 5.71 11/19/2020    HGB 9.2 (L) 11/19/2020    HCT 28.4 (L) 11/19/2020    HCT 23 (L) 09/18/2020     11/19/2020     Lab Results   Component Value Date    TACROLIMUS 7.2 11/17/2020       Assessment/Plan:     1. Prophylactic immunotherapy    2. 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    3. Chronic rejection of liver transplant    4. Perforated abdominal viscus        I will continue her on low-dose tacrolimus based monotherapy.    I will recheck her liver chemistry and immunosuppression levels next week.    I have refilled her nystatin as she appears to have persistent thrush.    She will return to clinic in 3 months time.      Jaya Nielsen MD           [facility] Patient Status  Functional Status:  60%  Physical Capacity: No Limitations

## 2020-12-01 NOTE — LETTER
December 1, 2020        Jordana Woods  [address] [facility] [address]  Phone: [phone]  Fax: [phone]             [facility]  [address]  [facility] [address]  Phone: [phone]   Patient: Elda Hernandez   MR Number: [id number]   YOB: 1970   Date of Visit: 12/1/2020       Dear Dr. Jordana Woods    Thank you for referring Elda Hernandez to me for evaluation. Attached you will find relevant portions of my assessment and plan of care.    If you have questions, please do not hesitate to call me. I look forward to following Elda Hernandez along with you.    Sincerely,    Jaya Nielsen MD    Enclosure    If you would like to receive this communication electronically, please contact [email or web address] or [phone] to request [employer] access.    [employer] is a tool which provides read-only access to select patient information with whom you have a relationship. Its easy to use and provides real time access to review your patients record including encounter summaries, notes, results, and demographic information.    If you feel you have received this communication in error or would no longer like to receive these types of communications, please e-mail [email or web address]

## 2020-12-02 ENCOUNTER — OFFICE VISIT (OUTPATIENT)
Dept: SURGERY | Facility: CLINIC | Age: 50
End: 2020-12-02
Payer: MEDICARE

## 2020-12-02 VITALS
SYSTOLIC BLOOD PRESSURE: 127 MMHG | WEIGHT: 116 LBS | HEART RATE: 85 BPM | BODY MASS INDEX: 23.39 KG/M2 | DIASTOLIC BLOOD PRESSURE: 60 MMHG | HEIGHT: 59 IN | TEMPERATURE: 98 F

## 2020-12-02 DIAGNOSIS — K25.5 GASTRIC PERFORATION: Primary | ICD-10-CM

## 2020-12-02 PROCEDURE — 99999 PR PBB SHADOW E&M-EST. PATIENT-LVL IV: ICD-10-PCS | Mod: PBBFAC,,, | Performed by: SURGERY

## 2020-12-02 PROCEDURE — 3008F PR BODY MASS INDEX (BMI) DOCUMENTED: ICD-10-PCS | Mod: S$GLB,,, | Performed by: SURGERY

## 2020-12-02 PROCEDURE — 99024 PR POST-OP FOLLOW-UP VISIT: ICD-10-PCS | Mod: S$GLB,,, | Performed by: SURGERY

## 2020-12-02 PROCEDURE — 1126F AMNT PAIN NOTED NONE PRSNT: CPT | Mod: S$GLB,,, | Performed by: SURGERY

## 2020-12-02 PROCEDURE — 99024 POSTOP FOLLOW-UP VISIT: CPT | Mod: S$GLB,,, | Performed by: SURGERY

## 2020-12-02 PROCEDURE — 99999 PR PBB SHADOW E&M-EST. PATIENT-LVL IV: CPT | Mod: PBBFAC,,, | Performed by: SURGERY

## 2020-12-02 PROCEDURE — 1126F PR PAIN SEVERITY QUANTIFIED, NO PAIN PRESENT: ICD-10-PCS | Mod: S$GLB,,, | Performed by: SURGERY

## 2020-12-02 PROCEDURE — 3008F BODY MASS INDEX DOCD: CPT | Mod: S$GLB,,, | Performed by: SURGERY

## 2020-12-02 NOTE — PROGRESS NOTES
S/p prolonged ICU course for upper GI perforation  Is home now and recovering quickly  She has a 26 french malencot as a G tube  She wants it removed  This was done without incident  I will be available to see her as needed

## 2020-12-03 ENCOUNTER — IMMUNIZATION (OUTPATIENT)
Dept: PHARMACY | Facility: CLINIC | Age: 50
End: 2020-12-03
Payer: MEDICARE

## 2020-12-05 ENCOUNTER — LAB VISIT (OUTPATIENT)
Dept: LAB | Facility: HOSPITAL | Age: 50
End: 2020-12-05
Attending: INTERNAL MEDICINE
Payer: MEDICARE

## 2020-12-05 DIAGNOSIS — E03.9 MYXEDEMA HEART DISEASE: Primary | ICD-10-CM

## 2020-12-05 DIAGNOSIS — I51.9 MYXEDEMA HEART DISEASE: Primary | ICD-10-CM

## 2020-12-05 DIAGNOSIS — E55.9 AVITAMINOSIS D: ICD-10-CM

## 2020-12-05 DIAGNOSIS — E46 UNSPECIFIED PROTEIN-CALORIE MALNUTRITION: ICD-10-CM

## 2020-12-05 DIAGNOSIS — D64.9 ANEMIA, UNSPECIFIED: ICD-10-CM

## 2020-12-05 DIAGNOSIS — C22.0 HEPATOCELLULAR CARCINOMA: ICD-10-CM

## 2020-12-05 DIAGNOSIS — Z94.4 LIVER REPLACED BY TRANSPLANT: ICD-10-CM

## 2020-12-05 LAB
25(OH)D3+25(OH)D2 SERPL-MCNC: 33 NG/ML (ref 30–96)
AFP SERPL-MCNC: 2.9 NG/ML (ref 0–8.4)
ALBUMIN SERPL BCP-MCNC: 3.1 G/DL (ref 3.5–5.2)
ALP SERPL-CCNC: 1108 U/L (ref 55–135)
ALT SERPL W/O P-5'-P-CCNC: 188 U/L (ref 10–44)
ANION GAP SERPL CALC-SCNC: 10 MMOL/L (ref 8–16)
AST SERPL-CCNC: 123 U/L (ref 10–40)
BASOPHILS # BLD AUTO: 0.06 K/UL (ref 0–0.2)
BASOPHILS # BLD AUTO: 0.07 K/UL (ref 0–0.2)
BASOPHILS NFR BLD: 1.4 % (ref 0–1.9)
BASOPHILS NFR BLD: 1.7 % (ref 0–1.9)
BILIRUB SERPL-MCNC: 1.6 MG/DL (ref 0.1–1)
BUN SERPL-MCNC: 51 MG/DL (ref 6–20)
CALCIUM SERPL-MCNC: 10.5 MG/DL (ref 8.7–10.5)
CHLORIDE SERPL-SCNC: 101 MMOL/L (ref 95–110)
CO2 SERPL-SCNC: 22 MMOL/L (ref 23–29)
CREAT SERPL-MCNC: 1.1 MG/DL (ref 0.5–1.4)
DIFFERENTIAL METHOD: ABNORMAL
DIFFERENTIAL METHOD: ABNORMAL
EOSINOPHIL # BLD AUTO: 0.3 K/UL (ref 0–0.5)
EOSINOPHIL # BLD AUTO: 0.4 K/UL (ref 0–0.5)
EOSINOPHIL NFR BLD: 8.1 % (ref 0–8)
EOSINOPHIL NFR BLD: 8.8 % (ref 0–8)
ERYTHROCYTE [DISTWIDTH] IN BLOOD BY AUTOMATED COUNT: 14.6 % (ref 11.5–14.5)
ERYTHROCYTE [DISTWIDTH] IN BLOOD BY AUTOMATED COUNT: 14.6 % (ref 11.5–14.5)
EST. GFR  (AFRICAN AMERICAN): >60 ML/MIN/1.73 M^2
EST. GFR  (NON AFRICAN AMERICAN): 57.9 ML/MIN/1.73 M^2
FERRITIN SERPL-MCNC: 2277 NG/ML (ref 20–300)
FOLATE SERPL-MCNC: 14.8 NG/ML (ref 4–24)
GLUCOSE SERPL-MCNC: 106 MG/DL (ref 70–110)
HCT VFR BLD AUTO: 36.5 % (ref 37–48.5)
HCT VFR BLD AUTO: 37.4 % (ref 37–48.5)
HGB BLD-MCNC: 11.6 G/DL (ref 12–16)
HGB BLD-MCNC: 11.7 G/DL (ref 12–16)
IMM GRANULOCYTES # BLD AUTO: 0 K/UL (ref 0–0.04)
IMM GRANULOCYTES # BLD AUTO: 0.01 K/UL (ref 0–0.04)
IMM GRANULOCYTES NFR BLD AUTO: 0 % (ref 0–0.5)
IMM GRANULOCYTES NFR BLD AUTO: 0.2 % (ref 0–0.5)
INR PPP: 1.1 (ref 0.8–1.2)
IRON SERPL-MCNC: 101 UG/DL (ref 30–160)
LYMPHOCYTES # BLD AUTO: 1 K/UL (ref 1–4.8)
LYMPHOCYTES # BLD AUTO: 1 K/UL (ref 1–4.8)
LYMPHOCYTES NFR BLD: 23.4 % (ref 18–48)
LYMPHOCYTES NFR BLD: 24.6 % (ref 18–48)
MCH RBC QN AUTO: 28.6 PG (ref 27–31)
MCH RBC QN AUTO: 29 PG (ref 27–31)
MCHC RBC AUTO-ENTMCNC: 31.3 G/DL (ref 32–36)
MCHC RBC AUTO-ENTMCNC: 31.8 G/DL (ref 32–36)
MCV RBC AUTO: 90 FL (ref 82–98)
MCV RBC AUTO: 93 FL (ref 82–98)
MONOCYTES # BLD AUTO: 0.6 K/UL (ref 0.3–1)
MONOCYTES # BLD AUTO: 0.6 K/UL (ref 0.3–1)
MONOCYTES NFR BLD: 13.3 % (ref 4–15)
MONOCYTES NFR BLD: 13.6 % (ref 4–15)
NEUTROPHILS # BLD AUTO: 2.2 K/UL (ref 1.8–7.7)
NEUTROPHILS # BLD AUTO: 2.2 K/UL (ref 1.8–7.7)
NEUTROPHILS NFR BLD: 52.3 % (ref 38–73)
NEUTROPHILS NFR BLD: 52.6 % (ref 38–73)
NRBC BLD-RTO: 0 /100 WBC
NRBC BLD-RTO: 0 /100 WBC
PLATELET # BLD AUTO: 151 K/UL (ref 150–350)
PLATELET # BLD AUTO: 160 K/UL (ref 150–350)
PMV BLD AUTO: 11.9 FL (ref 9.2–12.9)
PMV BLD AUTO: 12.2 FL (ref 9.2–12.9)
POTASSIUM SERPL-SCNC: 4.5 MMOL/L (ref 3.5–5.1)
PROT SERPL-MCNC: 8.4 G/DL (ref 6–8.4)
PROTHROMBIN TIME: 11.4 SEC (ref 9–12.5)
RBC # BLD AUTO: 4.03 M/UL (ref 4–5.4)
RBC # BLD AUTO: 4.05 M/UL (ref 4–5.4)
SATURATED IRON: 38 % (ref 20–50)
SODIUM SERPL-SCNC: 133 MMOL/L (ref 136–145)
T4 FREE SERPL-MCNC: 1.25 NG/DL (ref 0.71–1.51)
TOTAL IRON BINDING CAPACITY: 268 UG/DL (ref 250–450)
TRANSFERRIN SERPL-MCNC: 181 MG/DL (ref 200–375)
TSH SERPL DL<=0.005 MIU/L-ACNC: 0.15 UIU/ML (ref 0.4–4)
VIT B12 SERPL-MCNC: 1398 PG/ML (ref 210–950)
WBC # BLD AUTO: 4.19 K/UL (ref 3.9–12.7)
WBC # BLD AUTO: 4.22 K/UL (ref 3.9–12.7)

## 2020-12-05 PROCEDURE — 85025 COMPLETE CBC W/AUTO DIFF WBC: CPT | Mod: 91

## 2020-12-05 PROCEDURE — 84439 ASSAY OF FREE THYROXINE: CPT

## 2020-12-05 PROCEDURE — 82607 VITAMIN B-12: CPT

## 2020-12-05 PROCEDURE — 82746 ASSAY OF FOLIC ACID SERUM: CPT

## 2020-12-05 PROCEDURE — 36415 COLL VENOUS BLD VENIPUNCTURE: CPT | Mod: PO

## 2020-12-05 PROCEDURE — 82728 ASSAY OF FERRITIN: CPT

## 2020-12-05 PROCEDURE — 80053 COMPREHEN METABOLIC PANEL: CPT

## 2020-12-05 PROCEDURE — 82306 VITAMIN D 25 HYDROXY: CPT

## 2020-12-05 PROCEDURE — 82105 ALPHA-FETOPROTEIN SERUM: CPT

## 2020-12-05 PROCEDURE — 80197 ASSAY OF TACROLIMUS: CPT

## 2020-12-05 PROCEDURE — 83540 ASSAY OF IRON: CPT

## 2020-12-05 PROCEDURE — 84443 ASSAY THYROID STIM HORMONE: CPT

## 2020-12-05 PROCEDURE — 84134 ASSAY OF PREALBUMIN: CPT

## 2020-12-05 PROCEDURE — 85610 PROTHROMBIN TIME: CPT | Mod: PO

## 2020-12-06 LAB — TACROLIMUS BLD-MCNC: 11.1 NG/ML (ref 5–15)

## 2020-12-07 ENCOUNTER — TELEPHONE (OUTPATIENT)
Dept: TRANSPLANT | Facility: CLINIC | Age: 50
End: 2020-12-07

## 2020-12-07 LAB — PREALB SERPL-MCNC: 22 MG/DL (ref 20–43)

## 2020-12-07 NOTE — TELEPHONE ENCOUNTER
Sent patient letter to let her know labs are elevated but stable.  Please repeat labs on 12/19/20    ----- Message from Jaya Nielsen MD sent at 12/7/2020  9:06 AM CST -----  Results reviewed. No action.

## 2020-12-10 NOTE — PLAN OF CARE
Plan of care reviewed with patient and patient's family.  Remains intubated on mechanical ventilation, current settings AC 80%, 8 PEEP, rate 18.  Levo gtt at 0.06 mcg/kg/min, propofol at 40 mcg/kg/min, LR at 125 cc/hr, TPN at 60 cc/hr.  UOP marginal, approx 30 cc/hr.KERRY drains and wound vac with minimal output.  Gtube remains to dependant drainage, approx 300 cc output this shift.  NG tube to LIWS, no output this shift.  Partial DNR signed and placed in chart, family to bedside and updated on poor prognosis.  VSS throughout the day,see flowsheet for full assessment.    color consistent with ethnicity/race

## 2020-12-19 ENCOUNTER — LAB VISIT (OUTPATIENT)
Dept: LAB | Facility: HOSPITAL | Age: 50
End: 2020-12-19
Attending: INTERNAL MEDICINE
Payer: MEDICARE

## 2020-12-19 DIAGNOSIS — E03.9 MYXEDEMA HEART DISEASE: ICD-10-CM

## 2020-12-19 DIAGNOSIS — E55.9 AVITAMINOSIS D: ICD-10-CM

## 2020-12-19 DIAGNOSIS — E46 UNSPECIFIED PROTEIN-CALORIE MALNUTRITION: ICD-10-CM

## 2020-12-19 DIAGNOSIS — I51.9 MYXEDEMA HEART DISEASE: ICD-10-CM

## 2020-12-19 DIAGNOSIS — Z94.4 LIVER REPLACED BY TRANSPLANT: ICD-10-CM

## 2020-12-19 DIAGNOSIS — D64.9 ANEMIA, UNSPECIFIED: ICD-10-CM

## 2020-12-19 DIAGNOSIS — C22.0 HEPATOCELLULAR CARCINOMA: ICD-10-CM

## 2020-12-19 LAB
ALBUMIN SERPL BCP-MCNC: 3.4 G/DL (ref 3.5–5.2)
ALP SERPL-CCNC: 1149 U/L (ref 55–135)
ALT SERPL W/O P-5'-P-CCNC: 193 U/L (ref 10–44)
ANION GAP SERPL CALC-SCNC: 13 MMOL/L (ref 8–16)
AST SERPL-CCNC: 133 U/L (ref 10–40)
BASOPHILS # BLD AUTO: 0.04 K/UL (ref 0–0.2)
BASOPHILS NFR BLD: 1.3 % (ref 0–1.9)
BILIRUB SERPL-MCNC: 1.5 MG/DL (ref 0.1–1)
BUN SERPL-MCNC: 29 MG/DL (ref 6–20)
CALCIUM SERPL-MCNC: 10.1 MG/DL (ref 8.7–10.5)
CHLORIDE SERPL-SCNC: 103 MMOL/L (ref 95–110)
CO2 SERPL-SCNC: 22 MMOL/L (ref 23–29)
CREAT SERPL-MCNC: 0.9 MG/DL (ref 0.5–1.4)
DIFFERENTIAL METHOD: ABNORMAL
EOSINOPHIL # BLD AUTO: 0.3 K/UL (ref 0–0.5)
EOSINOPHIL NFR BLD: 9.3 % (ref 0–8)
ERYTHROCYTE [DISTWIDTH] IN BLOOD BY AUTOMATED COUNT: 14.4 % (ref 11.5–14.5)
EST. GFR  (AFRICAN AMERICAN): >60 ML/MIN/1.73 M^2
EST. GFR  (NON AFRICAN AMERICAN): >60 ML/MIN/1.73 M^2
GLUCOSE SERPL-MCNC: 75 MG/DL (ref 70–110)
HCT VFR BLD AUTO: 39 % (ref 37–48.5)
HGB BLD-MCNC: 12.7 G/DL (ref 12–16)
IMM GRANULOCYTES # BLD AUTO: 0.01 K/UL (ref 0–0.04)
IMM GRANULOCYTES NFR BLD AUTO: 0.3 % (ref 0–0.5)
INR PPP: 1 (ref 0.8–1.2)
LYMPHOCYTES # BLD AUTO: 1.1 K/UL (ref 1–4.8)
LYMPHOCYTES NFR BLD: 35.5 % (ref 18–48)
MCH RBC QN AUTO: 28.6 PG (ref 27–31)
MCHC RBC AUTO-ENTMCNC: 32.6 G/DL (ref 32–36)
MCV RBC AUTO: 88 FL (ref 82–98)
MONOCYTES # BLD AUTO: 0.4 K/UL (ref 0.3–1)
MONOCYTES NFR BLD: 13.4 % (ref 4–15)
NEUTROPHILS # BLD AUTO: 1.3 K/UL (ref 1.8–7.7)
NEUTROPHILS NFR BLD: 40.2 % (ref 38–73)
NRBC BLD-RTO: 0 /100 WBC
PLATELET # BLD AUTO: 132 K/UL (ref 150–350)
PMV BLD AUTO: 12.4 FL (ref 9.2–12.9)
POTASSIUM SERPL-SCNC: 4.1 MMOL/L (ref 3.5–5.1)
PROT SERPL-MCNC: 8.2 G/DL (ref 6–8.4)
PROTHROMBIN TIME: 11.2 SEC (ref 9–12.5)
RBC # BLD AUTO: 4.44 M/UL (ref 4–5.4)
SODIUM SERPL-SCNC: 138 MMOL/L (ref 136–145)
WBC # BLD AUTO: 3.13 K/UL (ref 3.9–12.7)

## 2020-12-19 PROCEDURE — 85025 COMPLETE CBC W/AUTO DIFF WBC: CPT

## 2020-12-19 PROCEDURE — 80197 ASSAY OF TACROLIMUS: CPT

## 2020-12-19 PROCEDURE — 36415 COLL VENOUS BLD VENIPUNCTURE: CPT | Mod: PO

## 2020-12-19 PROCEDURE — 80053 COMPREHEN METABOLIC PANEL: CPT

## 2020-12-19 PROCEDURE — 85610 PROTHROMBIN TIME: CPT

## 2020-12-20 LAB — TACROLIMUS BLD-MCNC: 5.8 NG/ML (ref 5–15)

## 2020-12-21 ENCOUNTER — TELEPHONE (OUTPATIENT)
Dept: TRANSPLANT | Facility: CLINIC | Age: 50
End: 2020-12-21

## 2020-12-21 NOTE — LETTER
December 21, 2020    Elda Hernandez  [address]  [facility] [address]          Dear Elda Andreea Hernandez:  MRN: [id number]    This is a follow up to your recent labs, your lab results were stable.  There are no medicine changes.  Please have your labs drawn again on 01/16/21.      If you cannot have your labs drawn on the scheduled date, it is your responsibility to call the transplant department to reschedule.  Please call [phone] and ask to speak to Lyla - Medical Assistant for all scheduling requests.     Sincerely,    Maude Chau RN      Your Liver Transplant Coordinator    [facility]  [address] [place] [id number] [phone]

## 2020-12-21 NOTE — TELEPHONE ENCOUNTER
Continue routine labs no changes needed.  Letter sent for next lab appointment 01/16/21      ----- Message from Jaya Nielsen MD sent at 12/21/2020  7:48 AM CST -----  Results reviewed. No action.

## 2021-01-01 ENCOUNTER — PATIENT MESSAGE (OUTPATIENT)
Dept: SURGERY | Facility: CLINIC | Age: 51
End: 2021-01-01

## 2021-01-05 ENCOUNTER — PATIENT MESSAGE (OUTPATIENT)
Dept: TRANSPLANT | Facility: CLINIC | Age: 51
End: 2021-01-05

## 2021-01-05 ENCOUNTER — OFFICE VISIT (OUTPATIENT)
Dept: FAMILY MEDICINE | Facility: CLINIC | Age: 51
End: 2021-01-05
Payer: MEDICARE

## 2021-01-05 VITALS
SYSTOLIC BLOOD PRESSURE: 108 MMHG | TEMPERATURE: 98 F | HEART RATE: 81 BPM | DIASTOLIC BLOOD PRESSURE: 64 MMHG | WEIGHT: 114 LBS | BODY MASS INDEX: 22.98 KG/M2 | OXYGEN SATURATION: 99 % | HEIGHT: 59 IN

## 2021-01-05 DIAGNOSIS — R26.2 DIFFICULTY WALKING: ICD-10-CM

## 2021-01-05 DIAGNOSIS — E03.4 HYPOTHYROIDISM DUE TO ACQUIRED ATROPHY OF THYROID: Primary | Chronic | ICD-10-CM

## 2021-01-05 DIAGNOSIS — G62.0 DRUG-INDUCED PERIPHERAL NEUROPATHY: ICD-10-CM

## 2021-01-05 DIAGNOSIS — Z94.4 S/P LIVER TRANSPLANT: Chronic | ICD-10-CM

## 2021-01-05 DIAGNOSIS — F32.89 OTHER DEPRESSION: Chronic | ICD-10-CM

## 2021-01-05 DIAGNOSIS — E22.2 SIADH (SYNDROME OF INAPPROPRIATE ADH PRODUCTION): ICD-10-CM

## 2021-01-05 DIAGNOSIS — E43 SEVERE PROTEIN-CALORIE MALNUTRITION: ICD-10-CM

## 2021-01-05 DIAGNOSIS — R51.9 NONINTRACTABLE HEADACHE, UNSPECIFIED CHRONICITY PATTERN, UNSPECIFIED HEADACHE TYPE: ICD-10-CM

## 2021-01-05 DIAGNOSIS — B00.1 FEVER BLISTER: ICD-10-CM

## 2021-01-05 DIAGNOSIS — G40.909 NONINTRACTABLE EPILEPSY WITHOUT STATUS EPILEPTICUS, UNSPECIFIED EPILEPSY TYPE: ICD-10-CM

## 2021-01-05 DIAGNOSIS — I10 ESSENTIAL HYPERTENSION: ICD-10-CM

## 2021-01-05 DIAGNOSIS — R74.8 ELEVATED LIVER ENZYMES: ICD-10-CM

## 2021-01-05 DIAGNOSIS — Z12.31 BREAST CANCER SCREENING BY MAMMOGRAM: ICD-10-CM

## 2021-01-05 DIAGNOSIS — G47.01 INSOMNIA DUE TO MEDICAL CONDITION: ICD-10-CM

## 2021-01-05 PROBLEM — Z99.11 DEPENDENCE ON RESPIRATOR (VENTILATOR) STATUS: Status: ACTIVE | Noted: 2021-01-05

## 2021-01-05 PROCEDURE — 3008F PR BODY MASS INDEX (BMI) DOCUMENTED: ICD-10-PCS | Mod: S$GLB,ICN,, | Performed by: INTERNAL MEDICINE

## 2021-01-05 PROCEDURE — 3074F SYST BP LT 130 MM HG: CPT | Mod: S$GLB,ICN,, | Performed by: INTERNAL MEDICINE

## 2021-01-05 PROCEDURE — 3078F PR MOST RECENT DIASTOLIC BLOOD PRESSURE < 80 MM HG: ICD-10-PCS | Mod: S$GLB,ICN,, | Performed by: INTERNAL MEDICINE

## 2021-01-05 PROCEDURE — 3078F DIAST BP <80 MM HG: CPT | Mod: S$GLB,ICN,, | Performed by: INTERNAL MEDICINE

## 2021-01-05 PROCEDURE — 99999 PR PBB SHADOW E&M-EST. PATIENT-LVL IV: ICD-10-PCS | Mod: PBBFAC,,, | Performed by: INTERNAL MEDICINE

## 2021-01-05 PROCEDURE — 3074F PR MOST RECENT SYSTOLIC BLOOD PRESSURE < 130 MM HG: ICD-10-PCS | Mod: S$GLB,ICN,, | Performed by: INTERNAL MEDICINE

## 2021-01-05 PROCEDURE — 99204 PR OFFICE/OUTPT VISIT, NEW, LEVL IV, 45-59 MIN: ICD-10-PCS | Mod: S$GLB,ICN,, | Performed by: INTERNAL MEDICINE

## 2021-01-05 PROCEDURE — 99204 OFFICE O/P NEW MOD 45 MIN: CPT | Mod: S$GLB,ICN,, | Performed by: INTERNAL MEDICINE

## 2021-01-05 PROCEDURE — 99999 PR PBB SHADOW E&M-EST. PATIENT-LVL IV: CPT | Mod: PBBFAC,,, | Performed by: INTERNAL MEDICINE

## 2021-01-05 PROCEDURE — 3008F BODY MASS INDEX DOCD: CPT | Mod: S$GLB,ICN,, | Performed by: INTERNAL MEDICINE

## 2021-01-05 RX ORDER — VALACYCLOVIR HYDROCHLORIDE 1 G/1
2000 TABLET, FILM COATED ORAL 2 TIMES DAILY
Qty: 4 TABLET | Refills: 3 | Status: SHIPPED | OUTPATIENT
Start: 2021-01-05 | End: 2022-01-28

## 2021-01-05 RX ORDER — BUTALBITAL, ASPIRIN, AND CAFFEINE 325; 50; 40 MG/1; MG/1; MG/1
2 CAPSULE ORAL
COMMUNITY
End: 2021-01-05 | Stop reason: SDUPTHER

## 2021-01-05 RX ORDER — CLONAZEPAM 0.5 MG/1
TABLET ORAL
COMMUNITY
Start: 2020-12-04 | End: 2021-01-05 | Stop reason: SDUPTHER

## 2021-01-05 RX ORDER — BUTALBITAL, ASPIRIN, AND CAFFEINE 325; 50; 40 MG/1; MG/1; MG/1
2 CAPSULE ORAL EVERY 6 HOURS PRN
Qty: 120 CAPSULE | Refills: 0 | Status: SHIPPED | OUTPATIENT
Start: 2021-01-05 | End: 2021-11-09 | Stop reason: SDUPTHER

## 2021-01-05 RX ORDER — LANOLIN ALCOHOL/MO/W.PET/CERES
400 CREAM (GRAM) TOPICAL 2 TIMES DAILY
Qty: 180 TABLET | Refills: 3 | Status: SHIPPED | OUTPATIENT
Start: 2021-01-05 | End: 2022-01-24

## 2021-01-05 RX ORDER — LEVOTHYROXINE SODIUM 75 UG/1
75 TABLET ORAL DAILY
Qty: 90 TABLET | Refills: 3 | Status: SHIPPED | OUTPATIENT
Start: 2021-01-05 | End: 2022-01-29

## 2021-01-05 RX ORDER — CLONAZEPAM 0.5 MG/1
0.5 TABLET ORAL NIGHTLY
Qty: 30 TABLET | Refills: 3 | Status: SHIPPED | OUTPATIENT
Start: 2021-01-05 | End: 2021-11-09 | Stop reason: SDUPTHER

## 2021-01-08 ENCOUNTER — PATIENT MESSAGE (OUTPATIENT)
Dept: TRANSPLANT | Facility: CLINIC | Age: 51
End: 2021-01-08

## 2021-01-09 ENCOUNTER — DOCUMENT SCAN (OUTPATIENT)
Dept: HOME HEALTH SERVICES | Facility: HOSPITAL | Age: 51
End: 2021-01-09

## 2021-01-18 NOTE — PT/OT/SLP EVAL
"Physical Therapy Evaluation    Patient Name:  Elda Hernandez   MRN:  [id number]    Recommendations:     Discharge Recommendations:  nursing facility, skilled   Discharge Equipment Recommendations: other (see comments)(TBD closer to discharge)   Barriers to discharge: Decreased caregiver support at current level of functional mobility.     Assessment:     Elda Hernandez is a 50 y.o. female admitted with a medical diagnosis of Perforated abdominal viscus.  She presents with the following impairments/functional limitations:  weakness, impaired endurance, impaired self care skills, impaired functional mobilty, gait instability, impaired balance, impaired cognition, decreased coordination, decreased upper extremity function, decreased lower extremity function, decreased safety awareness, pain, decreased ROM, impaired cardiopulmonary response to activity, edema Eval completed with OT. Eval limited as patient is not oriented to self, very anxious and confused. TotalA x 2 for bed mobility. Patient pushing posteriorly severely against PT in sitting EOB, grabbing ID badges and attempting to bite OT. Patient returned to bed safely without incident. Patient is not safe to return home when medically ready at current level of mobility and would benefit from skilled nursing facility to improve functional mobility and safety.      Rehab Prognosis: Fair; patient would benefit from acute skilled PT services to address these deficits and reach maximum level of function.    Recent Surgery: Procedure(s) (LRB):  LAPAROTOMY, EXPLORATORY with abd closure (N/A) 8 Days Post-Op    Plan:     During this hospitalization, patient to be seen 3 x/week to address the identified rehab impairments via gait training, therapeutic activities, therapeutic exercises, neuromuscular re-education and progress toward the following goals:    · Plan of Care Expires:  10/13/20    Subjective     Chief Complaint: none stated  Patient/Family Comments/goals: "I'm " aclovir refill  Can she get refill   [facility]  [facility]      Verbal communication is authorized for caller: Yes        It is okay to leave a a detailed message on their voicemail if needed.    Current Outpatient Medications   Medication Sig Dispense Refill   • acyclovir (ZOVIRAX) 400 MG tablet Take 1 tablet by mouth 3 times daily. Indications: Genital Herpes 90 tablet 0   • Apoaequorin (Prevagen) 10 MG Cap Take 10 mg by mouth daily.     • omeprazole (PriLOSEC) 40 MG capsule Take 1 capsule by mouth 2 times daily. 60 capsule 5   • Premarin 0.3 MG tablet Take 1 tablet by mouth daily. 30 tablet 3   • sumatriptan (IMITREX) 100 MG tablet Take 1 tablet by mouth as needed for Migraine. Take 1 tablet by mouth at onset of migraine. May repeat after 2 hours if needed. Do not exceed more than 2 tablets within 24 hours 30 tablet 3   • LORazepam (ATIVAN) 1 MG tablet Take 1 tablet by mouth 2 times daily as needed for Anxiety. 60 tablet 1   • clobetasol (TEMOVATE) 0.05 % ointment Apply to affected vaginal areas twice daily for 1 week, then decrease to 1-2 times weekly thereafter 30 g 0   • topiramate (TOPAMAX) 50 MG tablet Take 1 tablet by mouth 2 times daily. 20 tablet 0   • clobetasol emollient base 0.05 % cream Apply topically every 7 days. 1-2 times per week to be applied to scalp 30 g 0   • traMADol (ULTRAM) 50 MG tablet Take 0.5 tablets by mouth 2 times daily as needed for Pain. 30 tablet 1   • tiZANidine (ZANAFLEX) 4 MG tablet Take half to 1 tablet orally once daily before bedtime as needed for muscle spasms. 20 tablet 0   • ferrous gluconate 324 (37.5 Fe) MG tablet Take 324 mg by mouth daily.     • clobetasol (TEMOVATE) 0.05 % topical solution Apply topically as needed (For scalp seborrhea). Indications: Inflammation ( for seborrhea of scalp ) 50 mL 1   • Multiple Vitamin (MULTI VITAMIN DAILY) Tab Take 1 tablet by mouth daily.     • Glycerin-Polysorbate 80 (REFRESH DRY EYE THERAPY OP) Apply to eye nightly as needed.      •  " to Ellis Ruffin" "Stop it! You are standing on the baby!"  Pain/Comfort:  · Pain Rating 1: 0/10(patient unable to rate pain)  · Pain Rating Post-Intervention 1: 0/10    Patients cultural, spiritual, Caodaism conflicts given the current situation: no    Living Environment:  Lives with spouse. Unable to obtain home environment 2* AMS  Prior to admission, patients level of function was unable to obtain 2* AMS.  Equipment used at home: other (see comments)(unable to obtain history from patient due to not oriented).  DME owned (not currently used): unable to obtain 2* AMS.  Upon discharge, patient will have assistance from spouse.    Objective:     Communicated with nurse prior to session.  Patient found HOB elevated with SCD, pulse ox (continuous), telemetry, blood pressure cuff, oxygen, restraints, central line  upon PT entry to room.    General Precautions: Standard, fall   Orthopedic Precautions:N/A   Braces: N/A     Exams:  · RLE ROM: WNL, patient demonstrates crossing RLE over LLE in hooklying position  · RLE Strength: unable to formally assess, patient demonstrates at least 2+/5 through functiona mobility  · LLE ROM: unable to formally assess, demonstares good ROM through functional mobility  · LLE Strength: unable to formally assess, patient demonstrates at least 2+/5 through functiona mobility    Functional Mobility:  · Bed Mobility:     · Rolling Right: total assistance  · Supine to Sit: total assistance x 2 persons  · Sit to Supine: total assistance x 2 persons  · Balance: Sitting: poor, patient severely pushing posteriorly against PT. Appears behavioral more so than postural control.    Therapeutic Activities and Exercises:   Patient educated on role of PT in the hospital. Pt educated on plan and goals with physical therapy. Pt educated on importance of OOB activity to decrease the risks associated with bed rest.   Instruction for bed mobility.     AM-PAC 6 CLICK MOBILITY  Total Score:10     Patient " Ascorbic Acid (VITAMIN C) 500 MG tablet Take 1,000 mg by mouth 2 times daily.      • Magnesium 400 MG Tab Take 400 mg by mouth nightly.     • lactobacillus acidophilus (BACID) Take 1 tablet by mouth daily.     • Artificial Tear Solution ([employer] NATURAL TEARS OP) Apply 1 drop to eye daily.     • [employer] SALINE NASAL SPRAY NA Spray 1 spray in each nostril as needed.      • aspirin-acetaminophen-caffeine (EXCEDRIN MIGRAINE) 250-250-65 MG per tablet Take 2 tablets by mouth every 6 hours as needed for Pain.     • Aspirin-Caffeine 500-32.5 MG Tab Take 1-2 tablets by mouth every 6 hours as needed.      • cholecalciferol (VITAMIN D3) 1000 UNITS tablet Take 5,000 Units by mouth nightly.      • clotrimazole-betamethasone (LOTRISONE) cream Apply 1 application topically every 3 days as needed.      • Coenzyme Q10 200 MG Tab Take 200 mg by mouth daily.      • dimenhyDRINATE (DRAMAMINE) 50 MG tablet Take 50 mg by mouth every 4 hours as needed. Indications: Motion Sickness, Nausea and Vomiting      • fluticasone (FLONASE) 50 MCG/ACT nasal spray Spray 2 sprays in each nostril every 3 days as needed. Indications: Allergic Rhinitis      • Glutamine 500 MG Cap Take 2 capsules by mouth 2 times daily.     • vitamin E 400 UNIT capsule Take 400 Units by mouth nightly.      • Psyllium (VEGETABLE LAXATIVE PO) Take 1 capsule by mouth daily as needed.        No current facility-administered medications for this visit.        left HOB elevated with all lines intact, call button in reach, restraints reapplied at end of session and nurse notified.    GOALS:   Multidisciplinary Problems     Physical Therapy Goals        Problem: Physical Therapy Goal    Goal Priority Disciplines Outcome Goal Variances Interventions   Physical Therapy Goal     PT, PT/OT Ongoing, Progressing     Description: Goals to be met by: 20     Patient will increase functional independence with mobility by performin. Supine to sit with MInimal Assistance  2. Sit to supine with MInimal Assistance  3. Sit to stand transfer with Minimal Assistance  4. Bed to chair transfer with Minimal Assistance using Rolling Walker or no AD.   5. Gait  x 25 feet with Minimal Assistance using Rolling Walker or no AD.    6. Lower extremity exercise program x30 reps per handout, with independence                     History:     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    Arnold-Chiari malformation     Depression     Esophageal stricture     Essential tremor     Hypertension     Left bundle branch block     Liver fibrosis, transplanted liver 10/2/2018    Suggested on fibroscan 10/2/18    Migraine without aura     MVP (mitral valve prolapse)     Non-rheumatic mitral regurgitation 10/1/2018    Non-rheumatic tricuspid valve insufficiency 10/1/2018    Osteoporosis     Recurrent urinary tract infection     Seizures     Shingles     SIADH (syndrome of inappropriate ADH production)     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    Tricuspid valve prolapse     Urolithiasis     Von Gierke disease     s/p liver transplant       Past Surgical History:   Procedure Laterality Date    APPENDECTOMY  2007    COLONOSCOPY  2008    internal hemorrhoids    CRANIOTOMY      ESOPHAGOGASTRODUODENOSCOPY N/A 9/3/2020    Procedure: EGD (ESOPHAGOGASTRODUODENOSCOPY);  Surgeon: Tyrel Vergara MD;  [facility];  Service:  Endoscopy;  Laterality: N/A;    LAMINECTOMY  3/2001    LIVER TRANSPLANT  9/23/2002    OSSICULAR RECONSTRUCTION  10/4/1995    RIGHT REPLACEMENT PROSTHESIS for cholesteatoma    THYMECTOMY  5/2/2007    TONSILLECTOMY, ADENOIDECTOMY  1/21/2004    TOTAL ABDOMINAL HYSTERECTOMY  3/31/1994       Time Tracking:     PT Received On: 09/14/20  PT Start Time: 1006     PT Stop Time: 1017  PT Total Time (min): 11 min     Billable Minutes: Evaluation 11      Georgia Jaimes, PT  09/14/2020

## 2021-01-19 ENCOUNTER — TELEPHONE (OUTPATIENT)
Dept: TRANSPLANT | Facility: CLINIC | Age: 51
End: 2021-01-19

## 2021-01-19 ENCOUNTER — PATIENT MESSAGE (OUTPATIENT)
Dept: FAMILY MEDICINE | Facility: CLINIC | Age: 51
End: 2021-01-19

## 2021-01-19 DIAGNOSIS — T86.41 CHRONIC REJECTION OF LIVER TRANSPLANT: ICD-10-CM

## 2021-01-19 DIAGNOSIS — Z94.4 LIVER REPLACED BY TRANSPLANT: Primary | ICD-10-CM

## 2021-01-19 RX ORDER — RAMIPRIL 2.5 MG/1
CAPSULE ORAL
Qty: 90 CAPSULE | Refills: 0 | Status: CANCELLED | OUTPATIENT
Start: 2021-01-19

## 2021-01-19 RX ORDER — TACROLIMUS 1 MG/1
1 CAPSULE ORAL EVERY 12 HOURS
Qty: 60 CAPSULE | Refills: 11 | Status: SHIPPED | OUTPATIENT
Start: 2021-01-19 | End: 2021-02-01 | Stop reason: DRUGHIGH

## 2021-01-19 RX ORDER — TACROLIMUS 1 MG/1
1 CAPSULE ORAL EVERY 12 HOURS
Qty: 60 CAPSULE | Refills: 1 | Status: CANCELLED | OUTPATIENT
Start: 2021-01-19 | End: 2022-01-19

## 2021-01-19 RX ORDER — RAMIPRIL 2.5 MG/1
CAPSULE ORAL
Qty: 90 CAPSULE | Refills: 0 | Status: SHIPPED | OUTPATIENT
Start: 2021-01-19 | End: 2021-04-19

## 2021-01-20 RX ORDER — VENLAFAXINE HYDROCHLORIDE 150 MG/1
150 CAPSULE, EXTENDED RELEASE ORAL DAILY
Qty: 90 CAPSULE | Refills: 3 | Status: SHIPPED | OUTPATIENT
Start: 2021-01-20 | End: 2021-12-21

## 2021-01-21 ENCOUNTER — TELEPHONE (OUTPATIENT)
Dept: FAMILY MEDICINE | Facility: CLINIC | Age: 51
End: 2021-01-21

## 2021-01-22 ENCOUNTER — DOCUMENTATION ONLY (OUTPATIENT)
Dept: FAMILY MEDICINE | Facility: CLINIC | Age: 51
End: 2021-01-22

## 2021-01-23 ENCOUNTER — LAB VISIT (OUTPATIENT)
Dept: LAB | Facility: HOSPITAL | Age: 51
End: 2021-01-23
Attending: INTERNAL MEDICINE
Payer: MEDICARE

## 2021-01-23 DIAGNOSIS — E55.9 AVITAMINOSIS D: ICD-10-CM

## 2021-01-23 DIAGNOSIS — C22.0 HEPATOCELLULAR CARCINOMA: ICD-10-CM

## 2021-01-23 DIAGNOSIS — D64.9 ANEMIA, UNSPECIFIED: ICD-10-CM

## 2021-01-23 DIAGNOSIS — Z94.4 LIVER REPLACED BY TRANSPLANT: ICD-10-CM

## 2021-01-23 DIAGNOSIS — E46 UNSPECIFIED PROTEIN-CALORIE MALNUTRITION: ICD-10-CM

## 2021-01-23 DIAGNOSIS — I51.9 MYXEDEMA HEART DISEASE: ICD-10-CM

## 2021-01-23 DIAGNOSIS — E03.9 MYXEDEMA HEART DISEASE: ICD-10-CM

## 2021-01-23 LAB
ALBUMIN SERPL BCP-MCNC: 3.2 G/DL (ref 3.5–5.2)
ALP SERPL-CCNC: 883 U/L (ref 55–135)
ALT SERPL W/O P-5'-P-CCNC: 76 U/L (ref 10–44)
ANION GAP SERPL CALC-SCNC: 8 MMOL/L (ref 8–16)
AST SERPL-CCNC: 80 U/L (ref 10–40)
BASOPHILS # BLD AUTO: 0.03 K/UL (ref 0–0.2)
BASOPHILS NFR BLD: 0.9 % (ref 0–1.9)
BILIRUB SERPL-MCNC: 0.9 MG/DL (ref 0.1–1)
BUN SERPL-MCNC: 18 MG/DL (ref 6–20)
CALCIUM SERPL-MCNC: 9.7 MG/DL (ref 8.7–10.5)
CHLORIDE SERPL-SCNC: 106 MMOL/L (ref 95–110)
CO2 SERPL-SCNC: 25 MMOL/L (ref 23–29)
CREAT SERPL-MCNC: 0.9 MG/DL (ref 0.5–1.4)
DIFFERENTIAL METHOD: ABNORMAL
EOSINOPHIL # BLD AUTO: 0.3 K/UL (ref 0–0.5)
EOSINOPHIL NFR BLD: 9.1 % (ref 0–8)
ERYTHROCYTE [DISTWIDTH] IN BLOOD BY AUTOMATED COUNT: 14.5 % (ref 11.5–14.5)
EST. GFR  (AFRICAN AMERICAN): >60 ML/MIN/1.73 M^2
EST. GFR  (NON AFRICAN AMERICAN): >60 ML/MIN/1.73 M^2
GLUCOSE SERPL-MCNC: 85 MG/DL (ref 70–110)
HCT VFR BLD AUTO: 33.8 % (ref 37–48.5)
HGB BLD-MCNC: 11 G/DL (ref 12–16)
IMM GRANULOCYTES # BLD AUTO: 0.02 K/UL (ref 0–0.04)
IMM GRANULOCYTES NFR BLD AUTO: 0.6 % (ref 0–0.5)
LYMPHOCYTES # BLD AUTO: 1 K/UL (ref 1–4.8)
LYMPHOCYTES NFR BLD: 31.9 % (ref 18–48)
MCH RBC QN AUTO: 29.3 PG (ref 27–31)
MCHC RBC AUTO-ENTMCNC: 32.5 G/DL (ref 32–36)
MCV RBC AUTO: 90 FL (ref 82–98)
MONOCYTES # BLD AUTO: 0.5 K/UL (ref 0.3–1)
MONOCYTES NFR BLD: 14.1 % (ref 4–15)
NEUTROPHILS # BLD AUTO: 1.4 K/UL (ref 1.8–7.7)
NEUTROPHILS NFR BLD: 43.4 % (ref 38–73)
NRBC BLD-RTO: 0 /100 WBC
PLATELET # BLD AUTO: 119 K/UL (ref 150–350)
PMV BLD AUTO: 11.9 FL (ref 9.2–12.9)
POTASSIUM SERPL-SCNC: 4.1 MMOL/L (ref 3.5–5.1)
PROT SERPL-MCNC: 7.1 G/DL (ref 6–8.4)
RBC # BLD AUTO: 3.76 M/UL (ref 4–5.4)
SODIUM SERPL-SCNC: 139 MMOL/L (ref 136–145)
WBC # BLD AUTO: 3.2 K/UL (ref 3.9–12.7)

## 2021-01-23 PROCEDURE — 80197 ASSAY OF TACROLIMUS: CPT

## 2021-01-23 PROCEDURE — 85025 COMPLETE CBC W/AUTO DIFF WBC: CPT

## 2021-01-23 PROCEDURE — 80053 COMPREHEN METABOLIC PANEL: CPT

## 2021-01-23 PROCEDURE — 36415 COLL VENOUS BLD VENIPUNCTURE: CPT | Mod: PO

## 2021-01-24 LAB — TACROLIMUS BLD-MCNC: 12.8 NG/ML (ref 5–15)

## 2021-02-01 ENCOUNTER — PATIENT MESSAGE (OUTPATIENT)
Dept: TRANSPLANT | Facility: CLINIC | Age: 51
End: 2021-02-01

## 2021-02-01 DIAGNOSIS — Z94.4 LIVER REPLACED BY TRANSPLANT: Primary | ICD-10-CM

## 2021-02-01 RX ORDER — TACROLIMUS 0.5 MG/1
CAPSULE ORAL
Qty: 90 CAPSULE | Refills: 11 | Status: SHIPPED | OUTPATIENT
Start: 2021-02-01 | End: 2022-02-23

## 2021-02-04 ENCOUNTER — OFFICE VISIT (OUTPATIENT)
Dept: OPTOMETRY | Facility: CLINIC | Age: 51
End: 2021-02-04
Payer: MEDICARE

## 2021-02-04 DIAGNOSIS — H04.202 EPIPHORA, LEFT: Primary | ICD-10-CM

## 2021-02-04 PROCEDURE — 1126F PR PAIN SEVERITY QUANTIFIED, NO PAIN PRESENT: ICD-10-PCS | Mod: S$GLB,,, | Performed by: OPTOMETRIST

## 2021-02-04 PROCEDURE — 99999 PR PBB SHADOW E&M-EST. PATIENT-LVL III: CPT | Mod: PBBFAC,,, | Performed by: OPTOMETRIST

## 2021-02-04 PROCEDURE — 99999 PR PBB SHADOW E&M-EST. PATIENT-LVL III: ICD-10-PCS | Mod: PBBFAC,,, | Performed by: OPTOMETRIST

## 2021-02-04 PROCEDURE — 92012 INTRM OPH EXAM EST PATIENT: CPT | Mod: S$GLB,,, | Performed by: OPTOMETRIST

## 2021-02-04 PROCEDURE — 1126F AMNT PAIN NOTED NONE PRSNT: CPT | Mod: S$GLB,,, | Performed by: OPTOMETRIST

## 2021-02-04 PROCEDURE — 92012 PR EYE EXAM, EST PATIENT,INTERMED: ICD-10-PCS | Mod: S$GLB,,, | Performed by: OPTOMETRIST

## 2021-02-04 RX ORDER — LOTEPREDNOL ETABONATE 5 MG/ML
1 SUSPENSION/ DROPS OPHTHALMIC 4 TIMES DAILY
Qty: 5 ML | Refills: 1 | Status: SHIPPED | OUTPATIENT
Start: 2021-02-04 | End: 2021-02-18

## 2021-02-20 ENCOUNTER — LAB VISIT (OUTPATIENT)
Dept: LAB | Facility: HOSPITAL | Age: 51
End: 2021-02-20
Attending: INTERNAL MEDICINE
Payer: MEDICARE

## 2021-02-20 DIAGNOSIS — E46 UNSPECIFIED PROTEIN-CALORIE MALNUTRITION: ICD-10-CM

## 2021-02-20 DIAGNOSIS — E03.9 MYXEDEMA HEART DISEASE: ICD-10-CM

## 2021-02-20 DIAGNOSIS — I51.9 MYXEDEMA HEART DISEASE: ICD-10-CM

## 2021-02-20 DIAGNOSIS — D64.9 ANEMIA, UNSPECIFIED: ICD-10-CM

## 2021-02-20 DIAGNOSIS — E55.9 AVITAMINOSIS D: ICD-10-CM

## 2021-02-20 DIAGNOSIS — C22.0 HEPATOCELLULAR CARCINOMA: ICD-10-CM

## 2021-02-20 DIAGNOSIS — Z94.4 LIVER REPLACED BY TRANSPLANT: ICD-10-CM

## 2021-02-20 LAB
ALBUMIN SERPL BCP-MCNC: 3.4 G/DL (ref 3.5–5.2)
ALP SERPL-CCNC: 823 U/L (ref 55–135)
ALT SERPL W/O P-5'-P-CCNC: 117 U/L (ref 10–44)
ANION GAP SERPL CALC-SCNC: 10 MMOL/L (ref 8–16)
AST SERPL-CCNC: 131 U/L (ref 10–40)
BASOPHILS # BLD AUTO: 0.05 K/UL (ref 0–0.2)
BASOPHILS NFR BLD: 1.6 % (ref 0–1.9)
BILIRUB SERPL-MCNC: 1.1 MG/DL (ref 0.1–1)
BUN SERPL-MCNC: 18 MG/DL (ref 6–20)
CALCIUM SERPL-MCNC: 9.7 MG/DL (ref 8.7–10.5)
CHLORIDE SERPL-SCNC: 103 MMOL/L (ref 95–110)
CO2 SERPL-SCNC: 28 MMOL/L (ref 23–29)
CREAT SERPL-MCNC: 0.9 MG/DL (ref 0.5–1.4)
DIFFERENTIAL METHOD: ABNORMAL
EOSINOPHIL # BLD AUTO: 0.2 K/UL (ref 0–0.5)
EOSINOPHIL NFR BLD: 7.6 % (ref 0–8)
ERYTHROCYTE [DISTWIDTH] IN BLOOD BY AUTOMATED COUNT: 14 % (ref 11.5–14.5)
EST. GFR  (AFRICAN AMERICAN): >60 ML/MIN/1.73 M^2
EST. GFR  (NON AFRICAN AMERICAN): >60 ML/MIN/1.73 M^2
GLUCOSE SERPL-MCNC: 91 MG/DL (ref 70–110)
HCT VFR BLD AUTO: 39.4 % (ref 37–48.5)
HGB BLD-MCNC: 12.7 G/DL (ref 12–16)
IMM GRANULOCYTES # BLD AUTO: 0.01 K/UL (ref 0–0.04)
IMM GRANULOCYTES NFR BLD AUTO: 0.3 % (ref 0–0.5)
INR PPP: 1.1 (ref 0.8–1.2)
LYMPHOCYTES # BLD AUTO: 0.8 K/UL (ref 1–4.8)
LYMPHOCYTES NFR BLD: 25.1 % (ref 18–48)
MCH RBC QN AUTO: 30.5 PG (ref 27–31)
MCHC RBC AUTO-ENTMCNC: 32.2 G/DL (ref 32–36)
MCV RBC AUTO: 95 FL (ref 82–98)
MONOCYTES # BLD AUTO: 0.4 K/UL (ref 0.3–1)
MONOCYTES NFR BLD: 14 % (ref 4–15)
NEUTROPHILS # BLD AUTO: 1.6 K/UL (ref 1.8–7.7)
NEUTROPHILS NFR BLD: 51.4 % (ref 38–73)
NRBC BLD-RTO: 0 /100 WBC
PLATELET # BLD AUTO: 98 K/UL (ref 150–350)
PMV BLD AUTO: 12.7 FL (ref 9.2–12.9)
POTASSIUM SERPL-SCNC: 3.7 MMOL/L (ref 3.5–5.1)
PROT SERPL-MCNC: 7.2 G/DL (ref 6–8.4)
PROTHROMBIN TIME: 11.9 SEC (ref 9–12.5)
RBC # BLD AUTO: 4.16 M/UL (ref 4–5.4)
SODIUM SERPL-SCNC: 141 MMOL/L (ref 136–145)
WBC # BLD AUTO: 3.15 K/UL (ref 3.9–12.7)

## 2021-02-20 PROCEDURE — 80197 ASSAY OF TACROLIMUS: CPT

## 2021-02-20 PROCEDURE — 80053 COMPREHEN METABOLIC PANEL: CPT

## 2021-02-20 PROCEDURE — 85025 COMPLETE CBC W/AUTO DIFF WBC: CPT

## 2021-02-20 PROCEDURE — 85610 PROTHROMBIN TIME: CPT | Mod: PO

## 2021-02-20 PROCEDURE — 36415 COLL VENOUS BLD VENIPUNCTURE: CPT | Mod: PO

## 2021-02-21 LAB — TACROLIMUS BLD-MCNC: 7.6 NG/ML (ref 5–15)

## 2021-02-24 ENCOUNTER — TELEPHONE (OUTPATIENT)
Dept: TRANSPLANT | Facility: CLINIC | Age: 51
End: 2021-02-24

## 2021-02-24 DIAGNOSIS — C22.0 HEPATOCELLULAR CARCINOMA: Primary | ICD-10-CM

## 2021-02-24 RX ORDER — BUTALBITAL, ASPIRIN, AND CAFFEINE 325; 50; 40 MG/1; MG/1; MG/1
2 CAPSULE ORAL EVERY 6 HOURS PRN
Qty: 120 CAPSULE | Refills: 0 | Status: CANCELLED | OUTPATIENT
Start: 2021-02-24

## 2021-03-03 ENCOUNTER — PATIENT MESSAGE (OUTPATIENT)
Dept: FAMILY MEDICINE | Facility: CLINIC | Age: 51
End: 2021-03-03

## 2021-03-05 ENCOUNTER — HOSPITAL ENCOUNTER (OUTPATIENT)
Dept: RADIOLOGY | Facility: HOSPITAL | Age: 51
Discharge: HOME | End: 2021-03-05
Attending: INTERNAL MEDICINE
Payer: MEDICARE

## 2021-03-05 DIAGNOSIS — C22.0 HEPATOCELLULAR CARCINOMA: ICD-10-CM

## 2021-03-05 PROCEDURE — 93975 VASCULAR STUDY: CPT | Mod: TC,PO

## 2021-03-05 PROCEDURE — 76705 ECHO EXAM OF ABDOMEN: CPT | Mod: 26,XS,, | Performed by: RADIOLOGY

## 2021-03-05 PROCEDURE — 93975 VASCULAR STUDY: CPT | Mod: 26,,, | Performed by: RADIOLOGY

## 2021-03-05 PROCEDURE — 93975 US LIVER WITH DOPPLER: ICD-10-PCS | Mod: 26,,, | Performed by: RADIOLOGY

## 2021-03-05 PROCEDURE — 76705 US LIVER WITH DOPPLER: ICD-10-PCS | Mod: 26,XS,, | Performed by: RADIOLOGY

## 2021-03-09 ENCOUNTER — TELEPHONE (OUTPATIENT)
Dept: TRANSPLANT | Facility: CLINIC | Age: 51
End: 2021-03-09

## 2021-03-22 ENCOUNTER — PATIENT MESSAGE (OUTPATIENT)
Dept: FAMILY MEDICINE | Facility: CLINIC | Age: 51
End: 2021-03-22

## 2021-03-22 DIAGNOSIS — B37.0 THRUSH: Primary | ICD-10-CM

## 2021-03-22 RX ORDER — GENTIAN VIOLET 2% 2 G/100ML
0.5 SOLUTION TOPICAL 4 TIMES DAILY
Qty: 59 ML | Refills: 0 | COMMUNITY
Start: 2021-03-22 | End: 2023-02-14

## 2021-03-27 ENCOUNTER — IMMUNIZATION (OUTPATIENT)
Dept: FAMILY MEDICINE | Facility: CLINIC | Age: 51
End: 2021-03-27
Payer: MEDICARE

## 2021-03-27 ENCOUNTER — LAB VISIT (OUTPATIENT)
Dept: LAB | Facility: HOSPITAL | Age: 51
End: 2021-03-27
Attending: INTERNAL MEDICINE
Payer: MEDICARE

## 2021-03-27 DIAGNOSIS — I51.9 MYXEDEMA HEART DISEASE: ICD-10-CM

## 2021-03-27 DIAGNOSIS — E46 UNSPECIFIED PROTEIN-CALORIE MALNUTRITION: ICD-10-CM

## 2021-03-27 DIAGNOSIS — C22.0 HEPATOCELLULAR CARCINOMA: ICD-10-CM

## 2021-03-27 DIAGNOSIS — Z94.4 LIVER REPLACED BY TRANSPLANT: ICD-10-CM

## 2021-03-27 DIAGNOSIS — E03.9 MYXEDEMA HEART DISEASE: ICD-10-CM

## 2021-03-27 DIAGNOSIS — Z23 NEED FOR VACCINATION: Primary | ICD-10-CM

## 2021-03-27 DIAGNOSIS — E55.9 AVITAMINOSIS D: ICD-10-CM

## 2021-03-27 DIAGNOSIS — D64.9 ANEMIA, UNSPECIFIED: ICD-10-CM

## 2021-03-27 LAB
AFP SERPL-MCNC: 3.1 NG/ML (ref 0–8.4)
ALBUMIN SERPL BCP-MCNC: 3.6 G/DL (ref 3.5–5.2)
ALP SERPL-CCNC: 774 U/L (ref 55–135)
ALT SERPL W/O P-5'-P-CCNC: 218 U/L (ref 10–44)
ANION GAP SERPL CALC-SCNC: 14 MMOL/L (ref 8–16)
AST SERPL-CCNC: 193 U/L (ref 10–40)
BASOPHILS # BLD AUTO: 0.04 K/UL (ref 0–0.2)
BASOPHILS NFR BLD: 1.6 % (ref 0–1.9)
BILIRUB SERPL-MCNC: 1.6 MG/DL (ref 0.1–1)
BUN SERPL-MCNC: 22 MG/DL (ref 6–20)
CALCIUM SERPL-MCNC: 10.1 MG/DL (ref 8.7–10.5)
CHLORIDE SERPL-SCNC: 101 MMOL/L (ref 95–110)
CO2 SERPL-SCNC: 23 MMOL/L (ref 23–29)
CREAT SERPL-MCNC: 0.9 MG/DL (ref 0.5–1.4)
DIFFERENTIAL METHOD: ABNORMAL
EOSINOPHIL # BLD AUTO: 0.2 K/UL (ref 0–0.5)
EOSINOPHIL NFR BLD: 6.5 % (ref 0–8)
ERYTHROCYTE [DISTWIDTH] IN BLOOD BY AUTOMATED COUNT: 12.9 % (ref 11.5–14.5)
EST. GFR  (AFRICAN AMERICAN): >60 ML/MIN/1.73 M^2
EST. GFR  (NON AFRICAN AMERICAN): >60 ML/MIN/1.73 M^2
GLUCOSE SERPL-MCNC: 82 MG/DL (ref 70–110)
HCT VFR BLD AUTO: 39.6 % (ref 37–48.5)
HGB BLD-MCNC: 13.1 G/DL (ref 12–16)
IMM GRANULOCYTES # BLD AUTO: 0 K/UL (ref 0–0.04)
IMM GRANULOCYTES NFR BLD AUTO: 0 % (ref 0–0.5)
INR PPP: 1.1 (ref 0.8–1.2)
LYMPHOCYTES # BLD AUTO: 0.9 K/UL (ref 1–4.8)
LYMPHOCYTES NFR BLD: 35.5 % (ref 18–48)
MCH RBC QN AUTO: 30.2 PG (ref 27–31)
MCHC RBC AUTO-ENTMCNC: 33.1 G/DL (ref 32–36)
MCV RBC AUTO: 91 FL (ref 82–98)
MONOCYTES # BLD AUTO: 0.3 K/UL (ref 0.3–1)
MONOCYTES NFR BLD: 13.3 % (ref 4–15)
NEUTROPHILS # BLD AUTO: 1.1 K/UL (ref 1.8–7.7)
NEUTROPHILS NFR BLD: 43.1 % (ref 38–73)
NRBC BLD-RTO: 0 /100 WBC
PLATELET # BLD AUTO: 96 K/UL (ref 150–350)
PMV BLD AUTO: 12.9 FL (ref 9.2–12.9)
POTASSIUM SERPL-SCNC: 3.5 MMOL/L (ref 3.5–5.1)
PROT SERPL-MCNC: 7.6 G/DL (ref 6–8.4)
PROTHROMBIN TIME: 11.6 SEC (ref 9–12.5)
RBC # BLD AUTO: 4.34 M/UL (ref 4–5.4)
SODIUM SERPL-SCNC: 138 MMOL/L (ref 136–145)
WBC # BLD AUTO: 2.48 K/UL (ref 3.9–12.7)

## 2021-03-27 PROCEDURE — 91300 COVID-19, MRNA, LNP-S, PF, 30 MCG/0.3 ML DOSE VACCINE: ICD-10-PCS | Mod: ,,, | Performed by: FAMILY MEDICINE

## 2021-03-27 PROCEDURE — 0001A COVID-19, MRNA, LNP-S, PF, 30 MCG/0.3 ML DOSE VACCINE: CPT | Mod: CV19,,, | Performed by: FAMILY MEDICINE

## 2021-03-27 PROCEDURE — 91300 COVID-19, MRNA, LNP-S, PF, 30 MCG/0.3 ML DOSE VACCINE: CPT | Mod: ,,, | Performed by: FAMILY MEDICINE

## 2021-03-27 PROCEDURE — 80197 ASSAY OF TACROLIMUS: CPT | Performed by: INTERNAL MEDICINE

## 2021-03-27 PROCEDURE — 0001A COVID-19, MRNA, LNP-S, PF, 30 MCG/0.3 ML DOSE VACCINE: ICD-10-PCS | Mod: CV19,,, | Performed by: FAMILY MEDICINE

## 2021-03-27 PROCEDURE — 80053 COMPREHEN METABOLIC PANEL: CPT | Performed by: INTERNAL MEDICINE

## 2021-03-27 PROCEDURE — 85610 PROTHROMBIN TIME: CPT | Mod: PO | Performed by: INTERNAL MEDICINE

## 2021-03-27 PROCEDURE — 85025 COMPLETE CBC W/AUTO DIFF WBC: CPT | Performed by: INTERNAL MEDICINE

## 2021-03-27 PROCEDURE — 82105 ALPHA-FETOPROTEIN SERUM: CPT | Performed by: INTERNAL MEDICINE

## 2021-03-27 PROCEDURE — 36415 COLL VENOUS BLD VENIPUNCTURE: CPT | Mod: PO | Performed by: INTERNAL MEDICINE

## 2021-03-28 LAB — TACROLIMUS BLD-MCNC: 9.1 NG/ML (ref 5–15)

## 2021-03-31 ENCOUNTER — TELEPHONE (OUTPATIENT)
Dept: TRANSPLANT | Facility: CLINIC | Age: 51
End: 2021-03-31

## 2021-03-31 DIAGNOSIS — Z94.4 LIVER REPLACED BY TRANSPLANT: Primary | ICD-10-CM

## 2021-04-06 ENCOUNTER — PATIENT MESSAGE (OUTPATIENT)
Dept: ADMINISTRATIVE | Facility: HOSPITAL | Age: 51
End: 2021-04-06

## 2021-04-17 ENCOUNTER — IMMUNIZATION (OUTPATIENT)
Dept: FAMILY MEDICINE | Facility: CLINIC | Age: 51
End: 2021-04-17
Payer: MEDICARE

## 2021-04-17 DIAGNOSIS — Z23 NEED FOR VACCINATION: Primary | ICD-10-CM

## 2021-04-17 PROCEDURE — 91300 COVID-19, MRNA, LNP-S, PF, 30 MCG/0.3 ML DOSE VACCINE: CPT | Mod: PBBFAC | Performed by: FAMILY MEDICINE

## 2021-04-17 PROCEDURE — 0002A COVID-19, MRNA, LNP-S, PF, 30 MCG/0.3 ML DOSE VACCINE: CPT | Mod: PBBFAC | Performed by: FAMILY MEDICINE

## 2021-06-14 ENCOUNTER — TELEPHONE (OUTPATIENT)
Dept: TRANSPLANT | Facility: CLINIC | Age: 51
End: 2021-06-14

## 2021-06-19 ENCOUNTER — LAB VISIT (OUTPATIENT)
Dept: LAB | Facility: HOSPITAL | Age: 51
End: 2021-06-19
Attending: INTERNAL MEDICINE
Payer: MEDICARE

## 2021-06-19 DIAGNOSIS — Z94.4 LIVER REPLACED BY TRANSPLANT: ICD-10-CM

## 2021-06-19 LAB
ALBUMIN SERPL BCP-MCNC: 3.5 G/DL (ref 3.5–5.2)
ALP SERPL-CCNC: 632 U/L (ref 55–135)
ALT SERPL W/O P-5'-P-CCNC: 182 U/L (ref 10–44)
ANION GAP SERPL CALC-SCNC: 12 MMOL/L (ref 8–16)
AST SERPL-CCNC: 134 U/L (ref 10–40)
BASOPHILS # BLD AUTO: 0.05 K/UL (ref 0–0.2)
BASOPHILS NFR BLD: 1.5 % (ref 0–1.9)
BILIRUB SERPL-MCNC: 1.8 MG/DL (ref 0.1–1)
BUN SERPL-MCNC: 23 MG/DL (ref 6–20)
CALCIUM SERPL-MCNC: 10.2 MG/DL (ref 8.7–10.5)
CHLORIDE SERPL-SCNC: 103 MMOL/L (ref 95–110)
CO2 SERPL-SCNC: 24 MMOL/L (ref 23–29)
CREAT SERPL-MCNC: 0.8 MG/DL (ref 0.5–1.4)
DIFFERENTIAL METHOD: ABNORMAL
EOSINOPHIL # BLD AUTO: 0.4 K/UL (ref 0–0.5)
EOSINOPHIL NFR BLD: 11.4 % (ref 0–8)
ERYTHROCYTE [DISTWIDTH] IN BLOOD BY AUTOMATED COUNT: 13.8 % (ref 11.5–14.5)
EST. GFR  (AFRICAN AMERICAN): >60 ML/MIN/1.73 M^2
EST. GFR  (NON AFRICAN AMERICAN): >60 ML/MIN/1.73 M^2
GLUCOSE SERPL-MCNC: 91 MG/DL (ref 70–110)
HCT VFR BLD AUTO: 40.6 % (ref 37–48.5)
HGB BLD-MCNC: 13.4 G/DL (ref 12–16)
IMM GRANULOCYTES # BLD AUTO: 0 K/UL (ref 0–0.04)
IMM GRANULOCYTES NFR BLD AUTO: 0 % (ref 0–0.5)
LYMPHOCYTES # BLD AUTO: 1 K/UL (ref 1–4.8)
LYMPHOCYTES NFR BLD: 29.9 % (ref 18–48)
MCH RBC QN AUTO: 30.7 PG (ref 27–31)
MCHC RBC AUTO-ENTMCNC: 33 G/DL (ref 32–36)
MCV RBC AUTO: 93 FL (ref 82–98)
MONOCYTES # BLD AUTO: 0.4 K/UL (ref 0.3–1)
MONOCYTES NFR BLD: 11.7 % (ref 4–15)
NEUTROPHILS # BLD AUTO: 1.5 K/UL (ref 1.8–7.7)
NEUTROPHILS NFR BLD: 45.5 % (ref 38–73)
NRBC BLD-RTO: 0 /100 WBC
PLATELET # BLD AUTO: 90 K/UL (ref 150–450)
PMV BLD AUTO: 12.7 FL (ref 9.2–12.9)
POTASSIUM SERPL-SCNC: 3.4 MMOL/L (ref 3.5–5.1)
PROT SERPL-MCNC: 6.9 G/DL (ref 6–8.4)
RBC # BLD AUTO: 4.36 M/UL (ref 4–5.4)
SODIUM SERPL-SCNC: 139 MMOL/L (ref 136–145)
WBC # BLD AUTO: 3.24 K/UL (ref 3.9–12.7)

## 2021-06-19 PROCEDURE — 80053 COMPREHEN METABOLIC PANEL: CPT | Performed by: INTERNAL MEDICINE

## 2021-06-19 PROCEDURE — 80197 ASSAY OF TACROLIMUS: CPT | Performed by: INTERNAL MEDICINE

## 2021-06-19 PROCEDURE — 85025 COMPLETE CBC W/AUTO DIFF WBC: CPT | Performed by: INTERNAL MEDICINE

## 2021-06-19 PROCEDURE — 36415 COLL VENOUS BLD VENIPUNCTURE: CPT | Mod: PO | Performed by: INTERNAL MEDICINE

## 2021-06-20 LAB
TACROLIMUS BLD-MCNC: 11.2 NG/ML (ref 5–15)
TACROLIMUS, NORMALIZED: 9.8 NG/ML (ref 5–15)

## 2021-06-23 ENCOUNTER — TELEPHONE (OUTPATIENT)
Dept: TRANSPLANT | Facility: CLINIC | Age: 51
End: 2021-06-23

## 2021-06-23 ENCOUNTER — PATIENT MESSAGE (OUTPATIENT)
Dept: TRANSPLANT | Facility: CLINIC | Age: 51
End: 2021-06-23

## 2021-07-07 ENCOUNTER — PATIENT MESSAGE (OUTPATIENT)
Dept: ADMINISTRATIVE | Facility: HOSPITAL | Age: 51
End: 2021-07-07

## 2021-08-23 ENCOUNTER — IMMUNIZATION (OUTPATIENT)
Dept: FAMILY MEDICINE | Facility: CLINIC | Age: 51
End: 2021-08-23
Payer: MEDICARE

## 2021-08-23 DIAGNOSIS — Z23 NEED FOR VACCINATION: Primary | ICD-10-CM

## 2021-08-23 PROCEDURE — 91300 COVID-19, MRNA, LNP-S, PF, 30 MCG/0.3 ML DOSE VACCINE: ICD-10-PCS | Mod: ,,, | Performed by: RADIOLOGY

## 2021-08-23 PROCEDURE — 0003A COVID-19, MRNA, LNP-S, PF, 30 MCG/0.3 ML DOSE VACCINE: CPT | Mod: CV19,,, | Performed by: RADIOLOGY

## 2021-08-23 PROCEDURE — 91300 COVID-19, MRNA, LNP-S, PF, 30 MCG/0.3 ML DOSE VACCINE: CPT | Mod: ,,, | Performed by: RADIOLOGY

## 2021-08-23 PROCEDURE — 0003A COVID-19, MRNA, LNP-S, PF, 30 MCG/0.3 ML DOSE VACCINE: ICD-10-PCS | Mod: CV19,,, | Performed by: RADIOLOGY

## 2021-09-29 DIAGNOSIS — C22.0 HEPATOCELLULAR CARCINOMA: Primary | ICD-10-CM

## 2021-10-19 ENCOUNTER — HOSPITAL ENCOUNTER (OUTPATIENT)
Dept: RADIOLOGY | Facility: HOSPITAL | Age: 51
Discharge: HOME | End: 2021-10-19
Attending: INTERNAL MEDICINE
Payer: MEDICARE

## 2021-10-19 DIAGNOSIS — C22.0 HEPATOCELLULAR CARCINOMA: ICD-10-CM

## 2021-10-19 PROCEDURE — 76705 US LIVER TRANSPLANT POST: ICD-10-PCS | Mod: 26,XS,, | Performed by: RADIOLOGY

## 2021-10-19 PROCEDURE — 93975 US LIVER TRANSPLANT POST: ICD-10-PCS | Mod: 26,,, | Performed by: RADIOLOGY

## 2021-10-19 PROCEDURE — 76705 ECHO EXAM OF ABDOMEN: CPT | Mod: 26,XS,, | Performed by: RADIOLOGY

## 2021-10-19 PROCEDURE — 93975 VASCULAR STUDY: CPT | Mod: 26,,, | Performed by: RADIOLOGY

## 2021-10-19 PROCEDURE — 93975 VASCULAR STUDY: CPT | Mod: TC,PO

## 2021-10-20 ENCOUNTER — PATIENT MESSAGE (OUTPATIENT)
Dept: TRANSPLANT | Facility: CLINIC | Age: 51
End: 2021-10-20
Payer: MEDICARE

## 2021-10-20 ENCOUNTER — TELEPHONE (OUTPATIENT)
Dept: TRANSPLANT | Facility: CLINIC | Age: 51
End: 2021-10-20

## 2021-10-23 ENCOUNTER — LAB VISIT (OUTPATIENT)
Dept: LAB | Facility: HOSPITAL | Age: 51
End: 2021-10-23
Attending: INTERNAL MEDICINE
Payer: MEDICARE

## 2021-10-23 DIAGNOSIS — I51.9 MYXEDEMA HEART DISEASE: ICD-10-CM

## 2021-10-23 DIAGNOSIS — C22.0 HEPATOCELLULAR CARCINOMA: ICD-10-CM

## 2021-10-23 DIAGNOSIS — Z94.4 LIVER REPLACED BY TRANSPLANT: ICD-10-CM

## 2021-10-23 DIAGNOSIS — D64.9 ANEMIA, UNSPECIFIED: ICD-10-CM

## 2021-10-23 DIAGNOSIS — E46 UNSPECIFIED PROTEIN-CALORIE MALNUTRITION: ICD-10-CM

## 2021-10-23 DIAGNOSIS — E55.9 AVITAMINOSIS D: ICD-10-CM

## 2021-10-23 DIAGNOSIS — E03.9 MYXEDEMA HEART DISEASE: ICD-10-CM

## 2021-10-23 LAB
AFP SERPL-MCNC: 2.9 NG/ML (ref 0–8.4)
ALBUMIN SERPL BCP-MCNC: 3.3 G/DL (ref 3.5–5.2)
ALP SERPL-CCNC: 337 U/L (ref 55–135)
ALT SERPL W/O P-5'-P-CCNC: 61 U/L (ref 10–44)
ANION GAP SERPL CALC-SCNC: 14 MMOL/L (ref 8–16)
AST SERPL-CCNC: 62 U/L (ref 10–40)
BASOPHILS # BLD AUTO: 0.04 K/UL (ref 0–0.2)
BASOPHILS NFR BLD: 1.6 % (ref 0–1.9)
BILIRUB SERPL-MCNC: 1.4 MG/DL (ref 0.1–1)
BUN SERPL-MCNC: 22 MG/DL (ref 6–20)
CALCIUM SERPL-MCNC: 10 MG/DL (ref 8.7–10.5)
CHLORIDE SERPL-SCNC: 102 MMOL/L (ref 95–110)
CO2 SERPL-SCNC: 25 MMOL/L (ref 23–29)
CREAT SERPL-MCNC: 0.8 MG/DL (ref 0.5–1.4)
DIFFERENTIAL METHOD: ABNORMAL
EOSINOPHIL # BLD AUTO: 0.3 K/UL (ref 0–0.5)
EOSINOPHIL NFR BLD: 11.5 % (ref 0–8)
ERYTHROCYTE [DISTWIDTH] IN BLOOD BY AUTOMATED COUNT: 13.5 % (ref 11.5–14.5)
EST. GFR  (AFRICAN AMERICAN): >60 ML/MIN/1.73 M^2
EST. GFR  (NON AFRICAN AMERICAN): >60 ML/MIN/1.73 M^2
GLUCOSE SERPL-MCNC: 86 MG/DL (ref 70–110)
HCT VFR BLD AUTO: 37.6 % (ref 37–48.5)
HGB BLD-MCNC: 12.6 G/DL (ref 12–16)
IMM GRANULOCYTES # BLD AUTO: 0.01 K/UL (ref 0–0.04)
IMM GRANULOCYTES NFR BLD AUTO: 0.4 % (ref 0–0.5)
INR PPP: 1 (ref 0.8–1.2)
LYMPHOCYTES # BLD AUTO: 0.8 K/UL (ref 1–4.8)
LYMPHOCYTES NFR BLD: 30.9 % (ref 18–48)
MCH RBC QN AUTO: 30.3 PG (ref 27–31)
MCHC RBC AUTO-ENTMCNC: 33.5 G/DL (ref 32–36)
MCV RBC AUTO: 90 FL (ref 82–98)
MONOCYTES # BLD AUTO: 0.3 K/UL (ref 0.3–1)
MONOCYTES NFR BLD: 10.7 % (ref 4–15)
NEUTROPHILS # BLD AUTO: 1.1 K/UL (ref 1.8–7.7)
NEUTROPHILS NFR BLD: 44.9 % (ref 38–73)
NRBC BLD-RTO: 0 /100 WBC
PLATELET # BLD AUTO: 76 K/UL (ref 150–450)
PMV BLD AUTO: 12.6 FL (ref 9.2–12.9)
POTASSIUM SERPL-SCNC: 4 MMOL/L (ref 3.5–5.1)
PROT SERPL-MCNC: 6.9 G/DL (ref 6–8.4)
PROTHROMBIN TIME: 11.1 SEC (ref 9–12.5)
RBC # BLD AUTO: 4.16 M/UL (ref 4–5.4)
SODIUM SERPL-SCNC: 141 MMOL/L (ref 136–145)
WBC # BLD AUTO: 2.43 K/UL (ref 3.9–12.7)

## 2021-10-23 PROCEDURE — 36415 COLL VENOUS BLD VENIPUNCTURE: CPT | Mod: PO | Performed by: INTERNAL MEDICINE

## 2021-10-23 PROCEDURE — 85025 COMPLETE CBC W/AUTO DIFF WBC: CPT | Performed by: INTERNAL MEDICINE

## 2021-10-23 PROCEDURE — 80197 ASSAY OF TACROLIMUS: CPT | Performed by: INTERNAL MEDICINE

## 2021-10-23 PROCEDURE — 80053 COMPREHEN METABOLIC PANEL: CPT | Performed by: INTERNAL MEDICINE

## 2021-10-23 PROCEDURE — 85610 PROTHROMBIN TIME: CPT | Mod: PO | Performed by: INTERNAL MEDICINE

## 2021-10-23 PROCEDURE — 82105 ALPHA-FETOPROTEIN SERUM: CPT | Performed by: INTERNAL MEDICINE

## 2021-10-24 LAB — TACROLIMUS BLD-MCNC: 8.6 NG/ML (ref 5–15)

## 2021-10-25 ENCOUNTER — PATIENT MESSAGE (OUTPATIENT)
Dept: TRANSPLANT | Facility: CLINIC | Age: 51
End: 2021-10-25
Payer: MEDICARE

## 2021-10-25 ENCOUNTER — TELEPHONE (OUTPATIENT)
Dept: TRANSPLANT | Facility: CLINIC | Age: 51
End: 2021-10-25
Payer: MEDICARE

## 2021-11-04 ENCOUNTER — OFFICE VISIT (OUTPATIENT)
Dept: TRANSPLANT | Facility: CLINIC | Age: 51
End: 2021-11-04
Attending: INTERNAL MEDICINE
Payer: MEDICARE

## 2021-11-04 VITALS
HEIGHT: 59 IN | SYSTOLIC BLOOD PRESSURE: 149 MMHG | WEIGHT: 106.25 LBS | DIASTOLIC BLOOD PRESSURE: 68 MMHG | HEART RATE: 72 BPM | BODY MASS INDEX: 21.42 KG/M2 | OXYGEN SATURATION: 100 % | TEMPERATURE: 98 F

## 2021-11-04 DIAGNOSIS — Z94.4 S/P LIVER TRANSPLANT: Primary | Chronic | ICD-10-CM

## 2021-11-04 DIAGNOSIS — Z29.89 PROPHYLACTIC IMMUNOTHERAPY: Chronic | ICD-10-CM

## 2021-11-04 PROCEDURE — 99213 PR OFFICE/OUTPT VISIT, EST, LEVL III, 20-29 MIN: ICD-10-PCS | Mod: S$GLB,,, | Performed by: INTERNAL MEDICINE

## 2021-11-04 PROCEDURE — 99999 PR PBB SHADOW E&M-EST. PATIENT-LVL III: ICD-10-PCS | Mod: PBBFAC,,, | Performed by: INTERNAL MEDICINE

## 2021-11-04 PROCEDURE — 1160F PR REVIEW ALL MEDS BY PRESCRIBER/CLIN PHARMACIST DOCUMENTED: ICD-10-PCS | Mod: S$GLB,,, | Performed by: INTERNAL MEDICINE

## 2021-11-04 PROCEDURE — 3078F DIAST BP <80 MM HG: CPT | Mod: S$GLB,,, | Performed by: INTERNAL MEDICINE

## 2021-11-04 PROCEDURE — 1159F MED LIST DOCD IN RCRD: CPT | Mod: S$GLB,,, | Performed by: INTERNAL MEDICINE

## 2021-11-04 PROCEDURE — 1160F RVW MEDS BY RX/DR IN RCRD: CPT | Mod: S$GLB,,, | Performed by: INTERNAL MEDICINE

## 2021-11-04 PROCEDURE — 3077F SYST BP >= 140 MM HG: CPT | Mod: S$GLB,,, | Performed by: INTERNAL MEDICINE

## 2021-11-04 PROCEDURE — 4010F ACE/ARB THERAPY RXD/TAKEN: CPT | Mod: S$GLB,,, | Performed by: INTERNAL MEDICINE

## 2021-11-04 PROCEDURE — 4010F PR ACE/ARB THEARPY RXD/TAKEN: ICD-10-PCS | Mod: S$GLB,,, | Performed by: INTERNAL MEDICINE

## 2021-11-04 PROCEDURE — 99999 PR PBB SHADOW E&M-EST. PATIENT-LVL III: CPT | Mod: PBBFAC,,, | Performed by: INTERNAL MEDICINE

## 2021-11-04 PROCEDURE — 3077F PR MOST RECENT SYSTOLIC BLOOD PRESSURE >= 140 MM HG: ICD-10-PCS | Mod: S$GLB,,, | Performed by: INTERNAL MEDICINE

## 2021-11-04 PROCEDURE — 3078F PR MOST RECENT DIASTOLIC BLOOD PRESSURE < 80 MM HG: ICD-10-PCS | Mod: S$GLB,,, | Performed by: INTERNAL MEDICINE

## 2021-11-04 PROCEDURE — 3008F BODY MASS INDEX DOCD: CPT | Mod: S$GLB,,, | Performed by: INTERNAL MEDICINE

## 2021-11-04 PROCEDURE — 99213 OFFICE O/P EST LOW 20 MIN: CPT | Mod: S$GLB,,, | Performed by: INTERNAL MEDICINE

## 2021-11-04 PROCEDURE — 1159F PR MEDICATION LIST DOCUMENTED IN MEDICAL RECORD: ICD-10-PCS | Mod: S$GLB,,, | Performed by: INTERNAL MEDICINE

## 2021-11-04 PROCEDURE — 3008F PR BODY MASS INDEX (BMI) DOCUMENTED: ICD-10-PCS | Mod: S$GLB,,, | Performed by: INTERNAL MEDICINE

## 2021-11-09 ENCOUNTER — OFFICE VISIT (OUTPATIENT)
Dept: FAMILY MEDICINE | Facility: CLINIC | Age: 51
End: 2021-11-09
Payer: MEDICARE

## 2021-11-09 VITALS
HEIGHT: 59 IN | DIASTOLIC BLOOD PRESSURE: 74 MMHG | BODY MASS INDEX: 21.24 KG/M2 | WEIGHT: 105.38 LBS | HEART RATE: 77 BPM | OXYGEN SATURATION: 100 % | SYSTOLIC BLOOD PRESSURE: 128 MMHG

## 2021-11-09 DIAGNOSIS — F33.9 DEPRESSION, RECURRENT: ICD-10-CM

## 2021-11-09 DIAGNOSIS — E43 SEVERE PROTEIN-CALORIE MALNUTRITION: ICD-10-CM

## 2021-11-09 DIAGNOSIS — K72.10 CHRONIC LIVER FAILURE WITHOUT HEPATIC COMA: ICD-10-CM

## 2021-11-09 DIAGNOSIS — Z87.898 HISTORY OF SEIZURES: ICD-10-CM

## 2021-11-09 DIAGNOSIS — R11.0 NAUSEA: ICD-10-CM

## 2021-11-09 DIAGNOSIS — D69.6 THROMBOCYTOPENIA, UNSPECIFIED: ICD-10-CM

## 2021-11-09 DIAGNOSIS — I10 ESSENTIAL HYPERTENSION: ICD-10-CM

## 2021-11-09 DIAGNOSIS — R74.8 ELEVATED LIVER ENZYMES: ICD-10-CM

## 2021-11-09 DIAGNOSIS — I34.1 MVP (MITRAL VALVE PROLAPSE): ICD-10-CM

## 2021-11-09 DIAGNOSIS — Z12.31 BREAST CANCER SCREENING BY MAMMOGRAM: ICD-10-CM

## 2021-11-09 DIAGNOSIS — Z94.4 S/P LIVER TRANSPLANT: ICD-10-CM

## 2021-11-09 DIAGNOSIS — G47.01 INSOMNIA DUE TO MEDICAL CONDITION: ICD-10-CM

## 2021-11-09 DIAGNOSIS — R51.9 NONINTRACTABLE HEADACHE, UNSPECIFIED CHRONICITY PATTERN, UNSPECIFIED HEADACHE TYPE: ICD-10-CM

## 2021-11-09 DIAGNOSIS — R21 RASH OF FACE: Primary | ICD-10-CM

## 2021-11-09 PROBLEM — Z99.11 DEPENDENCE ON RESPIRATOR (VENTILATOR) STATUS: Status: RESOLVED | Noted: 2021-01-05 | Resolved: 2021-11-09

## 2021-11-09 PROCEDURE — 4010F ACE/ARB THERAPY RXD/TAKEN: CPT | Mod: S$GLB,,, | Performed by: INTERNAL MEDICINE

## 2021-11-09 PROCEDURE — 3078F PR MOST RECENT DIASTOLIC BLOOD PRESSURE < 80 MM HG: ICD-10-PCS | Mod: S$GLB,,, | Performed by: INTERNAL MEDICINE

## 2021-11-09 PROCEDURE — 1160F PR REVIEW ALL MEDS BY PRESCRIBER/CLIN PHARMACIST DOCUMENTED: ICD-10-PCS | Mod: S$GLB,,, | Performed by: INTERNAL MEDICINE

## 2021-11-09 PROCEDURE — 99214 OFFICE O/P EST MOD 30 MIN: CPT | Mod: S$GLB,,, | Performed by: INTERNAL MEDICINE

## 2021-11-09 PROCEDURE — 1160F RVW MEDS BY RX/DR IN RCRD: CPT | Mod: S$GLB,,, | Performed by: INTERNAL MEDICINE

## 2021-11-09 PROCEDURE — 3074F PR MOST RECENT SYSTOLIC BLOOD PRESSURE < 130 MM HG: ICD-10-PCS | Mod: S$GLB,,, | Performed by: INTERNAL MEDICINE

## 2021-11-09 PROCEDURE — 3008F PR BODY MASS INDEX (BMI) DOCUMENTED: ICD-10-PCS | Mod: S$GLB,,, | Performed by: INTERNAL MEDICINE

## 2021-11-09 PROCEDURE — 1159F MED LIST DOCD IN RCRD: CPT | Mod: S$GLB,,, | Performed by: INTERNAL MEDICINE

## 2021-11-09 PROCEDURE — 3078F DIAST BP <80 MM HG: CPT | Mod: S$GLB,,, | Performed by: INTERNAL MEDICINE

## 2021-11-09 PROCEDURE — 99999 PR PBB SHADOW E&M-EST. PATIENT-LVL IV: ICD-10-PCS | Mod: PBBFAC,,, | Performed by: INTERNAL MEDICINE

## 2021-11-09 PROCEDURE — 4010F PR ACE/ARB THEARPY RXD/TAKEN: ICD-10-PCS | Mod: S$GLB,,, | Performed by: INTERNAL MEDICINE

## 2021-11-09 PROCEDURE — 99214 PR OFFICE/OUTPT VISIT, EST, LEVL IV, 30-39 MIN: ICD-10-PCS | Mod: S$GLB,,, | Performed by: INTERNAL MEDICINE

## 2021-11-09 PROCEDURE — 3008F BODY MASS INDEX DOCD: CPT | Mod: S$GLB,,, | Performed by: INTERNAL MEDICINE

## 2021-11-09 PROCEDURE — 3074F SYST BP LT 130 MM HG: CPT | Mod: S$GLB,,, | Performed by: INTERNAL MEDICINE

## 2021-11-09 PROCEDURE — 99999 PR PBB SHADOW E&M-EST. PATIENT-LVL IV: CPT | Mod: PBBFAC,,, | Performed by: INTERNAL MEDICINE

## 2021-11-09 PROCEDURE — 1159F PR MEDICATION LIST DOCUMENTED IN MEDICAL RECORD: ICD-10-PCS | Mod: S$GLB,,, | Performed by: INTERNAL MEDICINE

## 2021-11-09 RX ORDER — BUTALBITAL, ASPIRIN, AND CAFFEINE 325; 50; 40 MG/1; MG/1; MG/1
2 CAPSULE ORAL EVERY 6 HOURS PRN
Qty: 120 CAPSULE | Refills: 0 | Status: SHIPPED | OUTPATIENT
Start: 2021-11-09 | End: 2022-01-30

## 2021-11-09 RX ORDER — CLONAZEPAM 0.5 MG/1
0.5 TABLET ORAL NIGHTLY
Qty: 30 TABLET | Refills: 3 | Status: SHIPPED | OUTPATIENT
Start: 2021-11-09 | End: 2022-03-10 | Stop reason: SDUPTHER

## 2021-11-09 RX ORDER — ONDANSETRON 4 MG/1
4 TABLET, ORALLY DISINTEGRATING ORAL EVERY 6 HOURS PRN
Qty: 30 TABLET | Refills: 0 | Status: SHIPPED | OUTPATIENT
Start: 2021-11-09 | End: 2021-12-21

## 2021-11-12 ENCOUNTER — DOCUMENTATION ONLY (OUTPATIENT)
Dept: FAMILY MEDICINE | Facility: CLINIC | Age: 51
End: 2021-11-12
Payer: MEDICARE

## 2021-12-01 ENCOUNTER — TELEPHONE (OUTPATIENT)
Dept: FAMILY MEDICINE | Facility: CLINIC | Age: 51
End: 2021-12-01
Payer: MEDICARE

## 2021-12-15 ENCOUNTER — PATIENT OUTREACH (OUTPATIENT)
Dept: ADMINISTRATIVE | Facility: OTHER | Age: 51
End: 2021-12-15
Payer: MEDICARE

## 2021-12-20 DIAGNOSIS — C22.0 HEPATOCELLULAR CARCINOMA: ICD-10-CM

## 2021-12-20 DIAGNOSIS — Z94.4 LIVER REPLACED BY TRANSPLANT: Primary | ICD-10-CM

## 2022-01-18 ENCOUNTER — PATIENT MESSAGE (OUTPATIENT)
Dept: TRANSPLANT | Facility: CLINIC | Age: 52
End: 2022-01-18
Payer: MEDICARE

## 2022-01-22 DIAGNOSIS — Z94.4 S/P LIVER TRANSPLANT: Chronic | ICD-10-CM

## 2022-01-24 RX ORDER — LANOLIN ALCOHOL/MO/W.PET/CERES
CREAM (GRAM) TOPICAL
Qty: 180 TABLET | Refills: 3 | Status: SHIPPED | OUTPATIENT
Start: 2022-01-24 | End: 2023-02-14 | Stop reason: SDUPTHER

## 2022-01-28 DIAGNOSIS — B00.1 FEVER BLISTER: ICD-10-CM

## 2022-01-28 RX ORDER — VALACYCLOVIR HYDROCHLORIDE 1 G/1
TABLET, FILM COATED ORAL
Qty: 4 TABLET | Refills: 3 | Status: SHIPPED | OUTPATIENT
Start: 2022-01-28 | End: 2023-04-06 | Stop reason: SDUPTHER

## 2022-01-29 ENCOUNTER — LAB VISIT (OUTPATIENT)
Dept: LAB | Facility: HOSPITAL | Age: 52
End: 2022-01-29
Attending: INTERNAL MEDICINE
Payer: MEDICARE

## 2022-01-29 ENCOUNTER — PATIENT MESSAGE (OUTPATIENT)
Dept: TRANSPLANT | Facility: CLINIC | Age: 52
End: 2022-01-29
Payer: MEDICARE

## 2022-01-29 DIAGNOSIS — E03.4 HYPOTHYROIDISM DUE TO ACQUIRED ATROPHY OF THYROID: Chronic | ICD-10-CM

## 2022-01-29 DIAGNOSIS — C22.0 HEPATOCELLULAR CARCINOMA: ICD-10-CM

## 2022-01-29 DIAGNOSIS — Z94.4 LIVER REPLACED BY TRANSPLANT: ICD-10-CM

## 2022-01-29 LAB
AFP SERPL-MCNC: 2.2 NG/ML (ref 0–8.4)
ALBUMIN SERPL BCP-MCNC: 3.1 G/DL (ref 3.5–5.2)
ALP SERPL-CCNC: 690 U/L (ref 55–135)
ALT SERPL W/O P-5'-P-CCNC: 104 U/L (ref 10–44)
ANION GAP SERPL CALC-SCNC: 10 MMOL/L (ref 8–16)
AST SERPL-CCNC: 100 U/L (ref 10–40)
BASOPHILS # BLD AUTO: 0.05 K/UL (ref 0–0.2)
BASOPHILS NFR BLD: 1.5 % (ref 0–1.9)
BILIRUB SERPL-MCNC: 1.5 MG/DL (ref 0.1–1)
BUN SERPL-MCNC: 13 MG/DL (ref 6–20)
CALCIUM SERPL-MCNC: 9.3 MG/DL (ref 8.7–10.5)
CHLORIDE SERPL-SCNC: 103 MMOL/L (ref 95–110)
CO2 SERPL-SCNC: 26 MMOL/L (ref 23–29)
CREAT SERPL-MCNC: 0.8 MG/DL (ref 0.5–1.4)
DIFFERENTIAL METHOD: ABNORMAL
EOSINOPHIL # BLD AUTO: 0.4 K/UL (ref 0–0.5)
EOSINOPHIL NFR BLD: 10.6 % (ref 0–8)
ERYTHROCYTE [DISTWIDTH] IN BLOOD BY AUTOMATED COUNT: 13.8 % (ref 11.5–14.5)
EST. GFR  (AFRICAN AMERICAN): >60 ML/MIN/1.73 M^2
EST. GFR  (NON AFRICAN AMERICAN): >60 ML/MIN/1.73 M^2
GLUCOSE SERPL-MCNC: 87 MG/DL (ref 70–110)
HCT VFR BLD AUTO: 41.7 % (ref 37–48.5)
HGB BLD-MCNC: 13.7 G/DL (ref 12–16)
IMM GRANULOCYTES # BLD AUTO: 0.01 K/UL (ref 0–0.04)
IMM GRANULOCYTES NFR BLD AUTO: 0.3 % (ref 0–0.5)
INR PPP: 1 (ref 0.8–1.2)
LYMPHOCYTES # BLD AUTO: 1 K/UL (ref 1–4.8)
LYMPHOCYTES NFR BLD: 29 % (ref 18–48)
MCH RBC QN AUTO: 29.7 PG (ref 27–31)
MCHC RBC AUTO-ENTMCNC: 32.9 G/DL (ref 32–36)
MCV RBC AUTO: 90 FL (ref 82–98)
MONOCYTES # BLD AUTO: 0.5 K/UL (ref 0.3–1)
MONOCYTES NFR BLD: 13.5 % (ref 4–15)
NEUTROPHILS # BLD AUTO: 1.5 K/UL (ref 1.8–7.7)
NEUTROPHILS NFR BLD: 45.1 % (ref 38–73)
NRBC BLD-RTO: 0 /100 WBC
PLATELET # BLD AUTO: 76 K/UL (ref 150–450)
PMV BLD AUTO: 12.9 FL (ref 9.2–12.9)
POTASSIUM SERPL-SCNC: 3.6 MMOL/L (ref 3.5–5.1)
PROT SERPL-MCNC: 7 G/DL (ref 6–8.4)
PROTHROMBIN TIME: 11.2 SEC (ref 9–12.5)
RBC # BLD AUTO: 4.62 M/UL (ref 4–5.4)
SODIUM SERPL-SCNC: 139 MMOL/L (ref 136–145)
WBC # BLD AUTO: 3.41 K/UL (ref 3.9–12.7)

## 2022-01-29 PROCEDURE — 82105 ALPHA-FETOPROTEIN SERUM: CPT | Performed by: INTERNAL MEDICINE

## 2022-01-29 PROCEDURE — 85610 PROTHROMBIN TIME: CPT | Mod: PO | Performed by: INTERNAL MEDICINE

## 2022-01-29 PROCEDURE — 80053 COMPREHEN METABOLIC PANEL: CPT | Performed by: INTERNAL MEDICINE

## 2022-01-29 PROCEDURE — 80197 ASSAY OF TACROLIMUS: CPT | Performed by: INTERNAL MEDICINE

## 2022-01-29 PROCEDURE — 85025 COMPLETE CBC W/AUTO DIFF WBC: CPT | Performed by: INTERNAL MEDICINE

## 2022-01-29 PROCEDURE — 36415 COLL VENOUS BLD VENIPUNCTURE: CPT | Mod: PO | Performed by: INTERNAL MEDICINE

## 2022-01-29 RX ORDER — LEVOTHYROXINE SODIUM 75 UG/1
TABLET ORAL
Qty: 90 TABLET | Refills: 0 | Status: SHIPPED | OUTPATIENT
Start: 2022-01-29 | End: 2022-04-21

## 2022-01-29 NOTE — TELEPHONE ENCOUNTER
No new care gaps identified.  Powered by [employer]. Reference number: [id number].   1/29/2022 9:17:55 AM CST

## 2022-01-29 NOTE — TELEPHONE ENCOUNTER
No new care gaps identified.  Powered by [employer] by [employer] Reference number: [id number].   1/28/2022 7:38:55 PM CST

## 2022-01-30 LAB — TACROLIMUS BLD-MCNC: 6.8 NG/ML (ref 5–15)

## 2022-01-30 NOTE — TELEPHONE ENCOUNTER
Refill Authorization Note   Elda Hernandez  is requesting a refill authorization.  Brief Assessment and Rationale for Refill:  Approve     Medication Therapy Plan:  MRM resolved    Medication Reconciliation Completed: No   Comments:   --->Care Gap information included below if applicable.   Orders Placed This Encounter    levothyroxine (SYNTHROID) 75 MCG tablet      Requested Prescriptions   Signed Prescriptions Disp Refills    levothyroxine (SYNTHROID) 75 MCG tablet 90 tablet 0     Sig: TAKE 1 TABLET(75 MCG) BY MOUTH EVERY DAY       Endocrinology:  Hypothyroid Agents Failed - 1/29/2022  9:16 AM        Failed - TSH in normal range and within 360 days     TSH   Date Value Ref Range Status   12/05/2020 0.155 (L) 0.400 - 4.000 uIU/mL Final   10/02/2020 3.373 0.400 - 4.000 uIU/mL Final   09/04/2020 1.360 0.400 - 4.000 uIU/mL Final              Passed - Patient is at least 18 years old        Passed - Valid encounter within last 15 months     Recent Visits  Date Type Provider Dept   11/09/21 Office Visit David Lorenzo MD [facility]   01/05/21 Office Visit David Lorenzo MD [facility]   Showing recent visits within past 720 days and meeting all other requirements  Future Appointments  No visits were found meeting these conditions.  Showing future appointments within next 150 days and meeting all other requirements      Future Appointments              [facility]    [facility] 1 month [facility]                Passed - Manual Review: FT4 is not required if last TSH is WNL.        Passed - T4 free within 1080 days     Free T4   Date Value Ref Range Status   12/05/2020 1.25 0.71 - 1.51 ng/dL Final   10/02/2020 1.03 0.71 - 1.51 ng/dL Final   09/04/2020 1.11 0.71 - 1.51 ng/dL Final                  Appointments  past 12m or future 3m with PCP    Date Provider   Last Visit   11/9/2021 David Lorenzo MD   Next Visit    3/9/2022 David Lorenzo MD   ED visits in past 90 days: 0     Note composed:7:19 PM 01/29/2022

## 2022-02-03 ENCOUNTER — PATIENT MESSAGE (OUTPATIENT)
Dept: FAMILY MEDICINE | Facility: CLINIC | Age: 52
End: 2022-02-03
Payer: MEDICARE

## 2022-02-03 ENCOUNTER — PATIENT MESSAGE (OUTPATIENT)
Dept: TRANSPLANT | Facility: CLINIC | Age: 52
End: 2022-02-03
Payer: MEDICARE

## 2022-02-03 DIAGNOSIS — K21.9 GASTROESOPHAGEAL REFLUX DISEASE WITHOUT ESOPHAGITIS: ICD-10-CM

## 2022-02-03 DIAGNOSIS — R11.0 NAUSEA: Primary | ICD-10-CM

## 2022-02-03 RX ORDER — PANTOPRAZOLE SODIUM 40 MG/1
40 TABLET, DELAYED RELEASE ORAL DAILY
Qty: 90 TABLET | Refills: 3 | Status: SHIPPED | OUTPATIENT
Start: 2022-02-03 | End: 2023-04-07

## 2022-02-03 RX ORDER — PROMETHAZINE HYDROCHLORIDE 25 MG/1
25 TABLET ORAL EVERY 4 HOURS PRN
Qty: 30 TABLET | Refills: 0 | Status: SHIPPED | OUTPATIENT
Start: 2022-02-03 | End: 2022-03-10 | Stop reason: SDUPTHER

## 2022-02-03 NOTE — TELEPHONE ENCOUNTER
No new care gaps identified.  Powered by [employer]. Reference number: [id number].   2/03/2022 8:57:58 AM CST

## 2022-02-04 ENCOUNTER — TELEPHONE (OUTPATIENT)
Dept: OPHTHALMOLOGY | Facility: CLINIC | Age: 52
End: 2022-02-04
Payer: MEDICARE

## 2022-02-04 NOTE — TELEPHONE ENCOUNTER
----- Message from Annette Sprague sent at 2/3/2022  2:47 PM CST -----  Contact: pt at [phone]    ----- Message -----  From: Ziggy Carrillo  Sent: 2/3/2022   2:41 PM CST  To: [facility] Staff    Type:  Sooner Apoointment Request  Caller is requesting a sooner appointment.  Caller declined first available appointment listed below.  Caller will not accept being placed on the waitlist and is requesting a message be sent to doctor.  Name of Caller:  pt  When is the first available appointment?  N/A  Symptoms:  eyes red hurt, blocked tear ducts  Best Call Back Number:  [phone]  Additional Information:  pt is calling the office to schedule an appointment due to her eyes red hurt, blocked tear ducts. Please call back and advise.

## 2022-02-08 ENCOUNTER — PATIENT OUTREACH (OUTPATIENT)
Dept: ADMINISTRATIVE | Facility: OTHER | Age: 52
End: 2022-02-08
Payer: MEDICARE

## 2022-02-08 NOTE — PROGRESS NOTES
LINKS immunization registry updated  Care Everywhere updated  Health Maintenance updated  DIS/Chart reviewed for overdue Proactive [facility] Encounters (SINDY) health maintenance testing (CRS, Breast Ca, Diabetic Eye Exam)   Orders entered:N/A  Portal message sent to patient with scheduling link for mammogram 10/11/21

## 2022-02-10 ENCOUNTER — OFFICE VISIT (OUTPATIENT)
Dept: CARDIOLOGY | Facility: CLINIC | Age: 52
End: 2022-02-10
Payer: MEDICARE

## 2022-02-10 VITALS
HEART RATE: 71 BPM | SYSTOLIC BLOOD PRESSURE: 116 MMHG | BODY MASS INDEX: 20.89 KG/M2 | DIASTOLIC BLOOD PRESSURE: 67 MMHG | WEIGHT: 103.63 LBS | HEIGHT: 59 IN

## 2022-02-10 DIAGNOSIS — I31.39 PERICARDIAL EFFUSION: ICD-10-CM

## 2022-02-10 DIAGNOSIS — R06.02 SOB (SHORTNESS OF BREATH): Primary | ICD-10-CM

## 2022-02-10 DIAGNOSIS — K72.10 CHRONIC LIVER FAILURE WITHOUT HEPATIC COMA: Chronic | ICD-10-CM

## 2022-02-10 DIAGNOSIS — I05.9 MITRAL VALVE DISORDER: ICD-10-CM

## 2022-02-10 DIAGNOSIS — I44.7 LBBB (LEFT BUNDLE BRANCH BLOCK): Chronic | ICD-10-CM

## 2022-02-10 DIAGNOSIS — R09.89 BRUIT OF RIGHT CAROTID ARTERY: ICD-10-CM

## 2022-02-10 PROCEDURE — 93010 EKG 12-LEAD: ICD-10-PCS | Mod: S$GLB,,, | Performed by: INTERNAL MEDICINE

## 2022-02-10 PROCEDURE — 3074F PR MOST RECENT SYSTOLIC BLOOD PRESSURE < 130 MM HG: ICD-10-PCS | Mod: CPTII,S$GLB,, | Performed by: INTERNAL MEDICINE

## 2022-02-10 PROCEDURE — 99999 PR PBB SHADOW E&M-EST. PATIENT-LVL V: CPT | Mod: PBBFAC,,, | Performed by: INTERNAL MEDICINE

## 2022-02-10 PROCEDURE — 99999 PR PBB SHADOW E&M-EST. PATIENT-LVL V: ICD-10-PCS | Mod: PBBFAC,,, | Performed by: INTERNAL MEDICINE

## 2022-02-10 PROCEDURE — 3078F PR MOST RECENT DIASTOLIC BLOOD PRESSURE < 80 MM HG: ICD-10-PCS | Mod: CPTII,S$GLB,, | Performed by: INTERNAL MEDICINE

## 2022-02-10 PROCEDURE — 99204 PR OFFICE/OUTPT VISIT, NEW, LEVL IV, 45-59 MIN: ICD-10-PCS | Mod: S$GLB,,, | Performed by: INTERNAL MEDICINE

## 2022-02-10 PROCEDURE — 93010 ELECTROCARDIOGRAM REPORT: CPT | Mod: S$GLB,,, | Performed by: INTERNAL MEDICINE

## 2022-02-10 PROCEDURE — 1159F MED LIST DOCD IN RCRD: CPT | Mod: CPTII,S$GLB,, | Performed by: INTERNAL MEDICINE

## 2022-02-10 PROCEDURE — 93005 ELECTROCARDIOGRAM TRACING: CPT | Mod: PO

## 2022-02-10 PROCEDURE — 1159F PR MEDICATION LIST DOCUMENTED IN MEDICAL RECORD: ICD-10-PCS | Mod: CPTII,S$GLB,, | Performed by: INTERNAL MEDICINE

## 2022-02-10 PROCEDURE — 99204 OFFICE O/P NEW MOD 45 MIN: CPT | Mod: S$GLB,,, | Performed by: INTERNAL MEDICINE

## 2022-02-10 PROCEDURE — 3008F PR BODY MASS INDEX (BMI) DOCUMENTED: ICD-10-PCS | Mod: CPTII,S$GLB,, | Performed by: INTERNAL MEDICINE

## 2022-02-10 PROCEDURE — 3008F BODY MASS INDEX DOCD: CPT | Mod: CPTII,S$GLB,, | Performed by: INTERNAL MEDICINE

## 2022-02-10 PROCEDURE — 4010F PR ACE/ARB THEARPY RXD/TAKEN: ICD-10-PCS | Mod: CPTII,S$GLB,, | Performed by: INTERNAL MEDICINE

## 2022-02-10 PROCEDURE — 3074F SYST BP LT 130 MM HG: CPT | Mod: CPTII,S$GLB,, | Performed by: INTERNAL MEDICINE

## 2022-02-10 PROCEDURE — 3078F DIAST BP <80 MM HG: CPT | Mod: CPTII,S$GLB,, | Performed by: INTERNAL MEDICINE

## 2022-02-10 PROCEDURE — 4010F ACE/ARB THERAPY RXD/TAKEN: CPT | Mod: CPTII,S$GLB,, | Performed by: INTERNAL MEDICINE

## 2022-02-10 NOTE — PROGRESS NOTES
Subjective:    Patient ID:  Elda Hernandez is a 51 y.o. female who presents for Coronary Artery Disease        HPI  REFERRED BY DR MILLER, LBBB DX 10 YEARS AGO, MVD, WITH REG AND TR, WAS FOLLOWED BY DR LAST AT [facility] > 5 YEARS AGO, LAST ECHO SMALL PE, RARE LIVER DISEASE, HAD LIVER TRANSPLANT 2002, ,HOSPITALIZED 3 MO LAST YEAR POST ESOPHAGEAL PERFORATION FROM EGD, , SEPSIS, LINGERING SX SINCE, FATIGUE, SOB, SLEEP DISORDER,   , SEE R    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    Arnold-Chiari malformation     Depression     Esophageal stricture     Essential tremor     Hypertension     Left bundle branch block     Liver fibrosis, transplanted liver 10/2/2018    Suggested on fibroscan 10/2/18    Migraine without aura     MVP (mitral valve prolapse)     Non-rheumatic mitral regurgitation 10/1/2018    Non-rheumatic tricuspid valve insufficiency 10/1/2018    Osteoporosis     Recurrent urinary tract infection     Seizures     Shingles 2007    SIADH (syndrome of inappropriate ADH production)     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    Tricuspid valve prolapse     Urolithiasis     Von Gierke disease     s/p liver transplant     Past Surgical History:   Procedure Laterality Date    APPENDECTOMY  6/22/2007    APPLICATION OF WOUND VACUUM-ASSISTED CLOSURE DEVICE N/A 9/18/2020    Procedure: APPLICATION, WOUND VAC;  Surgeon: Zain Decker MD;  Location: [facility] OR [address] [place] [address];  Service: General;  Laterality: N/A;    COLONOSCOPY  5/13/2008    internal hemorrhoids    CRANIOTOMY      ESOPHAGOGASTRODUODENOSCOPY N/A 9/3/2020    Procedure: EGD (ESOPHAGOGASTRODUODENOSCOPY);  Surgeon: Tyrel Vergara MD;  Location: [facility];  Service: Endoscopy;  Laterality: N/A;    EXPLORATORY LAPAROTOMY  2020    due to perforated stomach    LAMINECTOMY  3/2001    LIVER TRANSPLANT  9/23/2002    OSSICULAR RECONSTRUCTION  10/4/1995    RIGHT REPLACEMENT PROSTHESIS for  cholesteatoma    THYMECTOMY  5/2/2007    TONSILLECTOMY, ADENOIDECTOMY  1/21/2004    TOTAL ABDOMINAL HYSTERECTOMY  3/31/1994     Family History   Problem Relation Age of Onset    Stroke Mother     Heart disease Mother     No Known Problems Father      Social History     Socioeconomic History    Marital status:    Tobacco Use    Smoking status: Never Smoker    Smokeless tobacco: Never Used   Substance and Sexual Activity    Alcohol use: No    Drug use: No       Review of patient's allergies indicates:   Allergen Reactions    Codeine Itching     Other reaction(s): Itching    Lipitor [atorvastatin] Other (See Comments)     Other reaction(s): Muscle pain  Muscle cranmps    Morphine Itching     Other reaction(s): nausea and vomiting     Zoloft [sertraline] Other (See Comments)     Tremors/muscle spasms       Current Outpatient Medications:     acetaminophen (TYLENOL) 325 MG tablet, Take 2 tablets (650 mg total) by mouth every 6 (six) hours as needed., Disp: , Rfl: 0    butalbital-aspirin-caffeine -40 mg (FIORINAL) -40 mg Cap, TAKE 2 CAPSULES BY MOUTH EVERY 6 HOURS AS NEEDED FOR HEADACHE, Disp: 120 capsule, Rfl: 0    clonazePAM (KLONOPIN) 0.5 MG tablet, Take 1 tablet (0.5 mg total) by mouth every evening., Disp: 30 tablet, Rfl: 3    flu vacc [id number] 6mos up,PF, (FLUARIX QUAD [id number], PF,) 60 mcg (15 mcg x 4)/0.5 mL Syrg, Inject 0.5 mLs into the muscle., Disp: 0.5 mL, Rfl: 0    levothyroxine (SYNTHROID) 75 MCG tablet, TAKE 1 TABLET(75 MCG) BY MOUTH EVERY DAY, Disp: 90 tablet, Rfl: 0    magnesium oxide (MAG-OX) 400 mg (241.3 mg magnesium) tablet, TAKE 1 TABLET(400 MG) BY MOUTH TWICE DAILY, Disp: 180 tablet, Rfl: 3    multivitamin capsule, Take 1 capsule by mouth once daily., Disp: , Rfl:     ondansetron (ZOFRAN-ODT) 4 MG TbDL, DISSOLVE 1 TABLET(4 MG) ON THE TONGUE EVERY 6 HOURS AS NEEDED, Disp: 30 tablet, Rfl: 0    pantoprazole (PROTONIX) 40 MG tablet, Take 1 tablet (40 mg  total) by mouth once daily., Disp: 90 tablet, Rfl: 3    promethazine (PHENERGAN) 25 MG tablet, Take 1 tablet (25 mg total) by mouth every 4 (four) hours as needed (if unrelieved by zofran)., Disp: 30 tablet, Rfl: 0    ramipriL (ALTACE) 2.5 MG capsule, TAKE 1 CAPSULE(2.5 MG) BY MOUTH EVERY DAY, Disp: 90 capsule, Rfl: 0    senna-docusate 8.6-50 mg (PERICOLACE) 8.6-50 mg per tablet, Take 2 tablets by mouth once daily., Disp: 60 tablet, Rfl: 1    spironolactone (ALDACTONE) 50 MG tablet, TAKE 1 TABLET BY MOUTH ONCE DAILY, Disp: 90 tablet, Rfl: 0    valACYclovir (VALTREX) 1000 MG tablet, TAKE 2 TABLETS(2000 MG) BY MOUTH TWICE DAILY FOR 2 DOSES, Disp: 4 tablet, Rfl: 3    venlafaxine (EFFEXOR-XR) 150 MG Cp24, TAKE 1 CAPSULE(150 MG) BY MOUTH EVERY DAY, Disp: 90 capsule, Rfl: 3    calcium carbonate (TUMS) 200 mg calcium (500 mg) chewable tablet, Take 1 tablet (500 mg total) by mouth 2 (two) times daily as needed., Disp: , Rfl:     gentian violet 2 % topical solution, Use as directed 0.5 mLs in the mouth or throat 4 (four) times daily. (Patient not taking: No sig reported), Disp: 59 mL, Rfl: 0    tacrolimus (PROGRAF) 0.5 MG Cap, Take 2 capsules (1 mg total) by mouth every morning AND 1 capsule (0.5 mg total) every evening., Disp: 90 capsule, Rfl: 11    Review of Systems   Constitutional: Positive for malaise/fatigue. Negative for chills, diaphoresis, fever and night sweats.   HENT: Negative for congestion and nosebleeds.    Eyes: Negative for blurred vision and visual disturbance.   Cardiovascular: Positive for dyspnea on exertion. Negative for chest pain, claudication, cyanosis, irregular heartbeat, leg swelling (OCC), near-syncope, orthopnea, palpitations, paroxysmal nocturnal dyspnea and syncope.   Respiratory: Positive for shortness of breath. Negative for cough, hemoptysis and wheezing.    Endocrine: Negative for cold intolerance, heat intolerance and polyuria.   Hematologic/Lymphatic: Negative for adenopathy.  "Does not bruise/bleed easily.   Skin: Positive for dry skin (AROUND EYES). Negative for color change and rash.   Musculoskeletal: Negative for back pain (SOME SPINAL STENOSIS) and falls.   Gastrointestinal: Positive for dysphagia (BETTER) and nausea. Negative for abdominal pain, change in bowel habit, jaundice and melena.        LIVER CIRRHOSIS   Genitourinary: Positive for nocturia. Negative for dysuria and flank pain.   Neurological: Positive for light-headedness (OCC STANDING) and seizures. Negative for brief paralysis, focal weakness, loss of balance, numbness, tremors and weakness.        FOGGY   Psychiatric/Behavioral: Positive for memory loss. Negative for altered mental status and depression.   Allergic/Immunologic: Negative.         Objective:      Vitals:    02/10/22 1511   BP: 116/67   Pulse: 71   Weight: 47 kg (103 lb 9.9 oz)   Height: 4' 11" (1.499 m)   PainSc: 0-No pain     Body mass index is 20.93 kg/m².    Physical Exam  Constitutional:       Appearance: She is ill-appearing.   HENT:      Head: Normocephalic and atraumatic.   Eyes:      General: No scleral icterus.     Extraocular Movements: Extraocular movements intact.   Neck:      Vascular: Normal carotid pulses. Carotid bruit present. No JVD.   Cardiovascular:      Rate and Rhythm: Normal rate and regular rhythm.      Pulses: Normal pulses.           Carotid pulses are 2+ on the right side with bruit and 2+ on the left side.       Radial pulses are 2+ on the right side and 2+ on the left side.        Femoral pulses are 2+ on the right side and 2+ on the left side.       Posterior tibial pulses are 2+ on the right side and 2+ on the left side.      Heart sounds: Murmur heard.    Systolic murmur is present with a grade of 1/6 at the lower left sternal border and apex.  No friction rub. No gallop.    Pulmonary:      Effort: Pulmonary effort is normal. No respiratory distress.      Breath sounds: Normal breath sounds. No rales.   Abdominal:      " Palpations: Abdomen is soft.      Tenderness: There is no abdominal tenderness.       Musculoskeletal:      Cervical back: Neck supple.      Right lower leg: No edema.      Left lower leg: No edema.   Skin:     General: Skin is warm and dry.      Capillary Refill: Capillary refill takes less than 2 seconds.      Coloration: Skin is pale.   Neurological:      General: No focal deficit present.      Mental Status: She is alert.      Cranial Nerves: Cranial nerves are intact.   Psychiatric:         Mood and Affect: Mood normal.         Speech: Speech normal.         Behavior: Behavior normal.                 ..    Chemistry        Component Value Date/Time     01/29/2022 1151    K 3.6 01/29/2022 1151     01/29/2022 1151    CO2 26 01/29/2022 1151    BUN 13 01/29/2022 1151    CREATININE 0.8 01/29/2022 1151    GLU 87 01/29/2022 1151        Component Value Date/Time    CALCIUM 9.3 01/29/2022 1151    ALKPHOS 690 (H) 01/29/2022 1151     (H) 01/29/2022 1151     (H) 01/29/2022 1151    BILITOT 1.5 (H) 01/29/2022 1151    ESTGFRAFRICA >60.0 01/29/2022 1151    EGFRNONAA >60.0 01/29/2022 1151            ..  Lab Results   Component Value Date    CHOL 298 (H) 05/12/2017    CHOL 199 08/02/2014    CHOL 242 (H) 03/26/2011     Lab Results   Component Value Date     (H) 05/12/2017    HDL 74 08/02/2014    HDL 73 03/26/2011     Lab Results   Component Value Date    LDLCALC 147.2 05/12/2017    LDLCALC 109.0 08/02/2014    LDLCALC 141.8 (H) 03/26/2011     Lab Results   Component Value Date    TRIG 90 10/10/2020    TRIG 409 (H) 10/07/2020    TRIG 142 10/06/2020     Lab Results   Component Value Date    CHOLHDL 45.0 05/12/2017    CHOLHDL 37.2 08/02/2014    CHOLHDL 30.2 03/26/2011     ..  Lab Results   Component Value Date    WBC 3.41 (L) 01/29/2022    HGB 13.7 01/29/2022    HCT 41.7 01/29/2022    MCV 90 01/29/2022    PLT 76 (L) 01/29/2022       Test(s) Reviewed  I have reviewed the following in detail:  [] Stress  test   [] Angiography   [] Echocardiogram   [] Labs   [] Other:       Assessment:         ICD-10-CM ICD-9-CM   1. SOB (shortness of breath)  R06.02 786.05   2. Mitral valve disorder  I05.9 394.9   3. Pericardial effusion  I31.3 423.9   4. LBBB (left bundle branch block)  I44.7 426.3   5. Bruit of right carotid artery  R09.89 785.9   6. Chronic liver failure without hepatic coma  K72.10 572.8     Problem List Items Addressed This Visit        Cardiac/Vascular    LBBB (left bundle branch block)    Relevant Orders    IN OFFICE EKG 12-LEAD (to [place]) (Completed)    Nuclear Stress - Cardiology Interpreted    MVP (mitral valve prolapse)    Pericardial effusion    Relevant Orders    Echo    Bruit of right carotid artery    Relevant Orders    CV Ultrasound Bilateral Doppler Carotid       GI    Chronic liver failure without hepatic coma       Other    SOB (shortness of breath) - Primary    Relevant Orders    IN OFFICE EKG 12-LEAD (to [place]) (Completed)    Echo    Nuclear Stress - Cardiology Interpreted           Plan:     EKG SR LBBB, 138 MS, WILL NEED FURTHER EVALUATION, CHECK ECHO, PHARMACOLOGICAL NUCLEAR STRESS, TEST IN VIEW OF LEFT BUNDLE BRANCH BLOCK, CHECK CAROTID ULTRASOUND, ASSESS ANGINA EQUIVALENT NO OVERT HEART FAILURE NO TIA TYPE SYMPTOMS NO ARRHYTHMIA NO SYNCOPE, STAY ACTIVE, DISCUSSED PLAN WITH THE PATIENT AND HER  RETURN CLINIC FEW WEEKS AFTER TEST.      SOB (shortness of breath)  Comments:  MULTIFACTORIAL, EVALUATE  Orders:  -     IN OFFICE EKG 12-LEAD (to [place])  -     Echo  -     Nuclear Stress - Cardiology Interpreted; Future    Mitral valve disorder  Comments:  ECHO  Orders:  -     Ambulatory referral/consult to Cardiology  -     Echo  -     Nuclear Stress - Cardiology Interpreted; Future    Pericardial effusion  Comments:  ON PREVIOUS ECHO REASSESS  Orders:  -     Echo    LBBB (left bundle branch block)  -     IN OFFICE EKG 12-LEAD (to [place])  -     Nuclear Stress - Cardiology Interpreted;  Future    Bruit of right carotid artery  Comments:  CAROTID ULTRASOUND  Orders:  -     CV Ultrasound Bilateral Doppler Carotid; Future    Chronic liver failure without hepatic coma    RTC Low level/low impact aerobic exercise 5x's/wk. Heart healthy diet and risk factor modification.    See labs and med orders.    Aerobic exercise 5x's/wk. Heart healthy diet and risk factor modification.    See labs and med orders.

## 2022-03-08 ENCOUNTER — CLINICAL SUPPORT (OUTPATIENT)
Dept: CARDIOLOGY | Facility: HOSPITAL | Age: 52
End: 2022-03-08
Attending: INTERNAL MEDICINE
Payer: MEDICARE

## 2022-03-08 ENCOUNTER — HOSPITAL ENCOUNTER (OUTPATIENT)
Dept: RADIOLOGY | Facility: HOSPITAL | Age: 52
Discharge: HOME | End: 2022-03-08
Attending: INTERNAL MEDICINE
Payer: MEDICARE

## 2022-03-08 VITALS — BODY MASS INDEX: 20.76 KG/M2 | WEIGHT: 103 LBS | HEIGHT: 59 IN

## 2022-03-08 VITALS — BODY MASS INDEX: 20.76 KG/M2 | HEIGHT: 59 IN | WEIGHT: 103 LBS

## 2022-03-08 DIAGNOSIS — I05.9 MITRAL VALVE DISORDER: Chronic | ICD-10-CM

## 2022-03-08 DIAGNOSIS — R06.02 SOB (SHORTNESS OF BREATH): ICD-10-CM

## 2022-03-08 DIAGNOSIS — I44.7 LBBB (LEFT BUNDLE BRANCH BLOCK): Chronic | ICD-10-CM

## 2022-03-08 DIAGNOSIS — R09.89 BRUIT OF RIGHT CAROTID ARTERY: ICD-10-CM

## 2022-03-08 LAB
CV PHARM DOSE: 0.4 MG
CV STRESS BASE HR: 65 BPM
DIASTOLIC BLOOD PRESSURE: 68 MMHG
LEFT ARM DIASTOLIC BLOOD PRESSURE: 67 MMHG
LEFT ARM SYSTOLIC BLOOD PRESSURE: 116 MMHG
LEFT CBA DIAS: 25 CM/S
LEFT CBA SYS: 85 CM/S
LEFT CCA DIST DIAS: 19 CM/S
LEFT CCA DIST SYS: 74 CM/S
LEFT CCA MID DIAS: 18 CM/S
LEFT CCA MID SYS: 100 CM/S
LEFT CCA PROX DIAS: 14 CM/S
LEFT CCA PROX SYS: 82 CM/S
LEFT ECA DIAS: 3 CM/S
LEFT ECA SYS: 68 CM/S
LEFT ICA DIST DIAS: 35 CM/S
LEFT ICA DIST SYS: 109 CM/S
LEFT ICA MID DIAS: 35 CM/S
LEFT ICA MID SYS: 111 CM/S
LEFT ICA PROX DIAS: 36 CM/S
LEFT ICA PROX SYS: 111 CM/S
LEFT VERTEBRAL DIAS: 14 CM/S
LEFT VERTEBRAL SYS: 70 CM/S
NUC REST EJECTION FRACTION: 75
NUC STRESS EJECTION FRACTION: 76 %
OHS CV CAROTID RIGHT ICA EDV HIGHEST: 45
OHS CV CAROTID ULTRASOUND LEFT ICA/CCA RATIO: 1.5
OHS CV CAROTID ULTRASOUND RIGHT ICA/CCA RATIO: 2.97
OHS CV CPX 1 MINUTE RECOVERY HEART RATE: 84 BPM
OHS CV CPX 85 PERCENT MAX PREDICTED HEART RATE MALE: 135
OHS CV CPX MAX PREDICTED HEART RATE: 159
OHS CV CPX PATIENT IS FEMALE: 1
OHS CV CPX PATIENT IS MALE: 0
OHS CV CPX PEAK DIASTOLIC BLOOD PRESSURE: 68 MMHG
OHS CV CPX PEAK HEAR RATE: 86 BPM
OHS CV CPX PEAK RATE PRESSURE PRODUCT: NORMAL
OHS CV CPX PEAK SYSTOLIC BLOOD PRESSURE: 151 MMHG
OHS CV CPX PERCENT MAX PREDICTED HEART RATE ACHIEVED: 54
OHS CV CPX RATE PRESSURE PRODUCT PRESENTING: 9815
OHS CV PHARM TIME: 1427 MIN
OHS CV PV CAROTID LEFT HIGHEST CCA: 100
OHS CV PV CAROTID LEFT HIGHEST ICA: 111
OHS CV PV CAROTID RIGHT HIGHEST CCA: 99
OHS CV PV CAROTID RIGHT HIGHEST ICA: 181
OHS CV US CAROTID LEFT HIGHEST EDV: 36
RIGHT ARM DIASTOLIC BLOOD PRESSURE: 67 MMHG
RIGHT ARM SYSTOLIC BLOOD PRESSURE: 116 MMHG
RIGHT CBA DIAS: 12 CM/S
RIGHT CBA SYS: 56 CM/S
RIGHT CCA DIST DIAS: 16 CM/S
RIGHT CCA DIST SYS: 61 CM/S
RIGHT CCA MID DIAS: 11 CM/S
RIGHT CCA MID SYS: 67 CM/S
RIGHT CCA PROX DIAS: 14 CM/S
RIGHT CCA PROX SYS: 99 CM/S
RIGHT ECA DIAS: 5 CM/S
RIGHT ECA SYS: 78 CM/S
RIGHT ICA DIST DIAS: 45 CM/S
RIGHT ICA DIST SYS: 181 CM/S
RIGHT ICA MID DIAS: 22 CM/S
RIGHT ICA MID SYS: 73 CM/S
RIGHT ICA PROX DIAS: 18 CM/S
RIGHT ICA PROX SYS: 69 CM/S
RIGHT VERTEBRAL DIAS: 12 CM/S
RIGHT VERTEBRAL SYS: 82 CM/S
SYSTOLIC BLOOD PRESSURE: 151 MMHG

## 2022-03-08 PROCEDURE — 93306 TTE W/DOPPLER COMPLETE: CPT | Mod: 26,,, | Performed by: INTERNAL MEDICINE

## 2022-03-08 PROCEDURE — 63600175 PHARM REV CODE 636 W HCPCS: Mod: PO | Performed by: INTERNAL MEDICINE

## 2022-03-08 PROCEDURE — A9502 TC99M TETROFOSMIN: HCPCS | Mod: PO

## 2022-03-08 PROCEDURE — 93017 CV STRESS TEST TRACING ONLY: CPT | Mod: PO

## 2022-03-08 PROCEDURE — 93306 TTE W/DOPPLER COMPLETE: CPT | Mod: PO

## 2022-03-08 PROCEDURE — 78452 STRESS TEST WITH MYOCARDIAL PERFUSION (CUPID ONLY): ICD-10-PCS | Mod: 26,,, | Performed by: INTERNAL MEDICINE

## 2022-03-08 PROCEDURE — 93880 CV US DOPPLER CAROTID (CUPID ONLY): ICD-10-PCS | Mod: 26,,, | Performed by: INTERNAL MEDICINE

## 2022-03-08 PROCEDURE — 93880 EXTRACRANIAL BILAT STUDY: CPT | Mod: PO

## 2022-03-08 PROCEDURE — 93018 CV STRESS TEST I&R ONLY: CPT | Mod: ,,, | Performed by: INTERNAL MEDICINE

## 2022-03-08 PROCEDURE — 93016 STRESS TEST WITH MYOCARDIAL PERFUSION (CUPID ONLY): ICD-10-PCS | Mod: ,,, | Performed by: INTERNAL MEDICINE

## 2022-03-08 PROCEDURE — 93880 EXTRACRANIAL BILAT STUDY: CPT | Mod: 26,,, | Performed by: INTERNAL MEDICINE

## 2022-03-08 PROCEDURE — 78452 HT MUSCLE IMAGE SPECT MULT: CPT | Mod: 26,,, | Performed by: INTERNAL MEDICINE

## 2022-03-08 PROCEDURE — 93018 PR CARDIAC STRESS TST,INTERP/REPT ONLY: ICD-10-PCS | Mod: ,,, | Performed by: INTERNAL MEDICINE

## 2022-03-08 PROCEDURE — 93306 ECHO (CUPID ONLY): ICD-10-PCS | Mod: 26,,, | Performed by: INTERNAL MEDICINE

## 2022-03-08 PROCEDURE — 93016 CV STRESS TEST SUPVJ ONLY: CPT | Mod: ,,, | Performed by: INTERNAL MEDICINE

## 2022-03-08 RX ORDER — REGADENOSON 0.08 MG/ML
0.4 INJECTION, SOLUTION INTRAVENOUS ONCE
Status: COMPLETED | OUTPATIENT
Start: 2022-03-08 | End: 2022-03-08

## 2022-03-08 RX ADMIN — REGADENOSON 0.4 MG: 0.08 INJECTION, SOLUTION INTRAVENOUS at 02:03

## 2022-03-10 LAB
ASCENDING AORTA: 2.67 CM
AV INDEX (PROSTH): 0.86
AV MEAN GRADIENT: 6 MMHG
AV PEAK GRADIENT: 11 MMHG
AV VALVE AREA: 2.63 CM2
AV VELOCITY RATIO: 0.82
BSA FOR ECHO PROCEDURE: 1.39 M2
CV ECHO LV RWT: 0.49 CM
DOP CALC AO PEAK VEL: 1.64 M/S
DOP CALC AO VTI: 34.53 CM
DOP CALC LVOT AREA: 3 CM2
DOP CALC LVOT DIAMETER: 1.97 CM
DOP CALC LVOT PEAK VEL: 1.34 M/S
DOP CALC LVOT STROKE VOLUME: 90.82 CM3
DOP CALCLVOT PEAK VEL VTI: 29.81 CM
E WAVE DECELERATION TIME: 143.93 MSEC
E/A RATIO: 1.05
E/E' RATIO: 14.33 M/S
ECHO LV POSTERIOR WALL: 0.96 CM (ref 0.6–1.1)
EJECTION FRACTION: 65 %
FRACTIONAL SHORTENING: 34 % (ref 28–44)
INTERVENTRICULAR SEPTUM: 1.23 CM (ref 0.6–1.1)
LA MAJOR: 3.77 CM
LA MINOR: 3.59 CM
LA WIDTH: 3.85 CM
LEFT ATRIUM SIZE: 3.52 CM
LEFT ATRIUM VOLUME INDEX: 30.5 ML/M2
LEFT ATRIUM VOLUME: 42.37 CM3
LEFT INTERNAL DIMENSION IN SYSTOLE: 2.61 CM (ref 2.1–4)
LEFT VENTRICLE DIASTOLIC VOLUME INDEX: 48.65 ML/M2
LEFT VENTRICLE DIASTOLIC VOLUME: 67.62 ML
LEFT VENTRICLE MASS INDEX: 102 G/M2
LEFT VENTRICLE SYSTOLIC VOLUME INDEX: 17.9 ML/M2
LEFT VENTRICLE SYSTOLIC VOLUME: 24.92 ML
LEFT VENTRICULAR INTERNAL DIMENSION IN DIASTOLE: 3.94 CM (ref 3.5–6)
LEFT VENTRICULAR MASS: 141.36 G
LV LATERAL E/E' RATIO: 14.33 M/S
LV SEPTAL E/E' RATIO: 14.33 M/S
MV A" WAVE DURATION": 10.85 MSEC
MV PEAK A VEL: 0.82 M/S
MV PEAK E VEL: 0.86 M/S
MV STENOSIS PRESSURE HALF TIME: 41.74 MS
MV VALVE AREA P 1/2 METHOD: 5.27 CM2
PISA TR MAX VEL: 2.71 M/S
PULM VEIN S/D RATIO: 1.81
PV PEAK D VEL: 0.42 M/S
PV PEAK S VEL: 0.76 M/S
RA MAJOR: 3.52 CM
RA PRESSURE: 8 MMHG
RA WIDTH: 2.84 CM
RIGHT VENTRICULAR END-DIASTOLIC DIMENSION: 2.87 CM
RV TISSUE DOPPLER FREE WALL SYSTOLIC VELOCITY 1 (APICAL 4 CHAMBER VIEW): 15.94 CM/S
SINUS: 2.22 CM
STJ: 2.37 CM
TDI LATERAL: 0.06 M/S
TDI SEPTAL: 0.06 M/S
TDI: 0.06 M/S
TR MAX PG: 29 MMHG
TRICUSPID ANNULAR PLANE SYSTOLIC EXCURSION: 2.59 CM
TV REST PULMONARY ARTERY PRESSURE: 37 MMHG

## 2022-03-11 ENCOUNTER — TELEPHONE (OUTPATIENT)
Dept: CARDIOLOGY | Facility: CLINIC | Age: 52
End: 2022-03-11
Payer: MEDICARE

## 2022-03-11 NOTE — TELEPHONE ENCOUNTER
----- Message from Essie Frank MD sent at 3/11/2022 12:58 PM CST -----  Mild right carotid stenosis will monitor

## 2022-03-11 NOTE — TELEPHONE ENCOUNTER
----- Message from Essie Frank MD sent at 3/11/2022 12:58 PM CST -----  Normal LV function moderate MR will monitor, keep appointment

## 2022-03-21 ENCOUNTER — PATIENT OUTREACH (OUTPATIENT)
Dept: ADMINISTRATIVE | Facility: OTHER | Age: 52
End: 2022-03-21
Payer: MEDICARE

## 2022-03-21 NOTE — PROGRESS NOTES
Chart was reviewed for overdue Proactive [facility] Encounters (SINDY)  topics  Updates were requested from care everywhere  Health Maintenance has been updated  [place] immunization registry triggered

## 2022-03-24 ENCOUNTER — HOSPITAL ENCOUNTER (OUTPATIENT)
Facility: HOSPITAL | Age: 52
Discharge: HOME | End: 2022-03-27
Attending: EMERGENCY MEDICINE | Admitting: INTERNAL MEDICINE
Payer: MEDICARE

## 2022-03-24 ENCOUNTER — TELEPHONE (OUTPATIENT)
Dept: TRANSPLANT | Facility: CLINIC | Age: 52
End: 2022-03-24
Payer: MEDICARE

## 2022-03-24 DIAGNOSIS — R10.9 ABDOMINAL PAIN: Primary | ICD-10-CM

## 2022-03-24 DIAGNOSIS — Z94.4 LIVER TRANSPLANT RECIPIENT: ICD-10-CM

## 2022-03-24 DIAGNOSIS — K72.10 CHRONIC LIVER FAILURE WITHOUT HEPATIC COMA: ICD-10-CM

## 2022-03-24 DIAGNOSIS — R07.9 CHEST PAIN: ICD-10-CM

## 2022-03-24 DIAGNOSIS — R14.0 ABDOMINAL DISTENTION: ICD-10-CM

## 2022-03-24 LAB
ALBUMIN SERPL BCP-MCNC: 2.8 G/DL (ref 3.5–5.2)
ALP SERPL-CCNC: 701 U/L (ref 55–135)
ALT SERPL W/O P-5'-P-CCNC: 71 U/L (ref 10–44)
ANION GAP SERPL CALC-SCNC: 9 MMOL/L (ref 8–16)
AST SERPL-CCNC: 99 U/L (ref 10–40)
BASOPHILS # BLD AUTO: 0.05 K/UL (ref 0–0.2)
BASOPHILS NFR BLD: 1.2 % (ref 0–1.9)
BILIRUB SERPL-MCNC: 1.5 MG/DL (ref 0.1–1)
BILIRUB UR QL STRIP: NEGATIVE
BUN SERPL-MCNC: 11 MG/DL (ref 6–20)
BUN SERPL-MCNC: 13 MG/DL (ref 6–30)
CALCIUM SERPL-MCNC: 9 MG/DL (ref 8.7–10.5)
CHLORIDE SERPL-SCNC: 103 MMOL/L (ref 95–110)
CHLORIDE SERPL-SCNC: 103 MMOL/L (ref 95–110)
CLARITY UR REFRACT.AUTO: CLEAR
CO2 SERPL-SCNC: 27 MMOL/L (ref 23–29)
COLOR UR AUTO: YELLOW
CREAT SERPL-MCNC: 0.7 MG/DL (ref 0.5–1.4)
CREAT SERPL-MCNC: 0.7 MG/DL (ref 0.5–1.4)
DIFFERENTIAL METHOD: ABNORMAL
EOSINOPHIL # BLD AUTO: 0.4 K/UL (ref 0–0.5)
EOSINOPHIL NFR BLD: 10.1 % (ref 0–8)
ERYTHROCYTE [DISTWIDTH] IN BLOOD BY AUTOMATED COUNT: 14 % (ref 11.5–14.5)
EST. GFR  (AFRICAN AMERICAN): >60 ML/MIN/1.73 M^2
EST. GFR  (NON AFRICAN AMERICAN): >60 ML/MIN/1.73 M^2
GLUCOSE SERPL-MCNC: 82 MG/DL (ref 70–110)
GLUCOSE SERPL-MCNC: 84 MG/DL (ref 70–110)
GLUCOSE UR QL STRIP: NEGATIVE
HCT VFR BLD AUTO: 38 % (ref 37–48.5)
HCT VFR BLD CALC: 34 %PCV (ref 36–54)
HGB BLD-MCNC: 12.6 G/DL (ref 12–16)
HGB UR QL STRIP: NEGATIVE
IMM GRANULOCYTES # BLD AUTO: 0.01 K/UL (ref 0–0.04)
IMM GRANULOCYTES NFR BLD AUTO: 0.2 % (ref 0–0.5)
INR PPP: 1.2 (ref 0.8–1.2)
KETONES UR QL STRIP: NEGATIVE
LEUKOCYTE ESTERASE UR QL STRIP: NEGATIVE
LIPASE SERPL-CCNC: 64 U/L (ref 4–60)
LYMPHOCYTES # BLD AUTO: 0.9 K/UL (ref 1–4.8)
LYMPHOCYTES NFR BLD: 21.2 % (ref 18–48)
MCH RBC QN AUTO: 30.7 PG (ref 27–31)
MCHC RBC AUTO-ENTMCNC: 33.2 G/DL (ref 32–36)
MCV RBC AUTO: 93 FL (ref 82–98)
MONOCYTES # BLD AUTO: 0.4 K/UL (ref 0.3–1)
MONOCYTES NFR BLD: 9.9 % (ref 4–15)
NEUTROPHILS # BLD AUTO: 2.4 K/UL (ref 1.8–7.7)
NEUTROPHILS NFR BLD: 57.4 % (ref 38–73)
NITRITE UR QL STRIP: NEGATIVE
NRBC BLD-RTO: 0 /100 WBC
PH UR STRIP: 7 [PH] (ref 5–8)
PLATELET # BLD AUTO: 90 K/UL (ref 150–450)
PMV BLD AUTO: 10.7 FL (ref 9.2–12.9)
POC IONIZED CALCIUM: 1.05 MMOL/L (ref 1.06–1.42)
POC TCO2 (MEASURED): 25 MMOL/L (ref 23–29)
POTASSIUM BLD-SCNC: 3.5 MMOL/L (ref 3.5–5.1)
POTASSIUM SERPL-SCNC: 3.8 MMOL/L (ref 3.5–5.1)
PROCALCITONIN SERPL IA-MCNC: 0.25 NG/ML
PROT SERPL-MCNC: 6.8 G/DL (ref 6–8.4)
PROT UR QL STRIP: NEGATIVE
PROTHROMBIN TIME: 12.3 SEC (ref 9–12.5)
RBC # BLD AUTO: 4.1 M/UL (ref 4–5.4)
SAMPLE: ABNORMAL
SODIUM BLD-SCNC: 137 MMOL/L (ref 136–145)
SODIUM SERPL-SCNC: 139 MMOL/L (ref 136–145)
SP GR UR STRIP: 1.02 (ref 1–1.03)
URN SPEC COLLECT METH UR: NORMAL
WBC # BLD AUTO: 4.24 K/UL (ref 3.9–12.7)

## 2022-03-24 PROCEDURE — 96376 TX/PRO/DX INJ SAME DRUG ADON: CPT

## 2022-03-24 PROCEDURE — 81003 URINALYSIS AUTO W/O SCOPE: CPT | Performed by: NURSE PRACTITIONER

## 2022-03-24 PROCEDURE — 80047 BASIC METABLC PNL IONIZED CA: CPT | Mod: 59

## 2022-03-24 PROCEDURE — 63600175 PHARM REV CODE 636 W HCPCS: Performed by: PHYSICIAN ASSISTANT

## 2022-03-24 PROCEDURE — 84145 PROCALCITONIN (PCT): CPT | Performed by: PHYSICIAN ASSISTANT

## 2022-03-24 PROCEDURE — 80053 COMPREHEN METABOLIC PANEL: CPT | Performed by: NURSE PRACTITIONER

## 2022-03-24 PROCEDURE — 85610 PROTHROMBIN TIME: CPT | Performed by: PHYSICIAN ASSISTANT

## 2022-03-24 PROCEDURE — 80197 ASSAY OF TACROLIMUS: CPT | Performed by: NURSE PRACTITIONER

## 2022-03-24 PROCEDURE — 96375 TX/PRO/DX INJ NEW DRUG ADDON: CPT

## 2022-03-24 PROCEDURE — 99285 EMERGENCY DEPT VISIT HI MDM: CPT | Mod: ,,, | Performed by: PHYSICIAN ASSISTANT

## 2022-03-24 PROCEDURE — 99285 PR EMERGENCY DEPT VISIT,LEVEL V: ICD-10-PCS | Mod: ,,, | Performed by: PHYSICIAN ASSISTANT

## 2022-03-24 PROCEDURE — 25500020 PHARM REV CODE 255: Performed by: EMERGENCY MEDICINE

## 2022-03-24 PROCEDURE — 83690 ASSAY OF LIPASE: CPT | Performed by: NURSE PRACTITIONER

## 2022-03-24 PROCEDURE — 85025 COMPLETE CBC W/AUTO DIFF WBC: CPT | Performed by: NURSE PRACTITIONER

## 2022-03-24 PROCEDURE — 99285 EMERGENCY DEPT VISIT HI MDM: CPT | Mod: 25

## 2022-03-24 RX ORDER — ONDANSETRON 2 MG/ML
4 INJECTION INTRAMUSCULAR; INTRAVENOUS
Status: COMPLETED | OUTPATIENT
Start: 2022-03-24 | End: 2022-03-24

## 2022-03-24 RX ORDER — MORPHINE SULFATE 4 MG/ML
4 INJECTION, SOLUTION INTRAMUSCULAR; INTRAVENOUS
Status: DISCONTINUED | OUTPATIENT
Start: 2022-03-24 | End: 2022-03-24

## 2022-03-24 RX ORDER — HYDROMORPHONE HYDROCHLORIDE 1 MG/ML
0.5 INJECTION, SOLUTION INTRAMUSCULAR; INTRAVENOUS; SUBCUTANEOUS
Status: COMPLETED | OUTPATIENT
Start: 2022-03-24 | End: 2022-03-24

## 2022-03-24 RX ADMIN — ONDANSETRON 4 MG: 2 INJECTION INTRAMUSCULAR; INTRAVENOUS at 07:03

## 2022-03-24 RX ADMIN — HYDROMORPHONE HYDROCHLORIDE 0.5 MG: 1 INJECTION, SOLUTION INTRAMUSCULAR; INTRAVENOUS; SUBCUTANEOUS at 07:03

## 2022-03-24 RX ADMIN — IOHEXOL 75 ML: 350 INJECTION, SOLUTION INTRAVENOUS at 09:03

## 2022-03-24 RX ADMIN — HYDROMORPHONE HYDROCHLORIDE 0.5 MG: 1 INJECTION, SOLUTION INTRAMUSCULAR; INTRAVENOUS; SUBCUTANEOUS at 09:03

## 2022-03-24 NOTE — ED PROVIDER NOTES
"Encounter Date: 3/24/2022       History     Chief Complaint   Patient presents with    Abdominal Pain     Liver transplant, + abd distention and pain, + nausea.     The history is provided by the patient and medical records. No  was used.      Elda Hernandez is a 51 y.o. 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     Patient reports abdominal pain and swelling over the past 4 days. Locates the abd pain to RUQ. She has associated nausea without emesis. Last BM was 2 days ago. She takes OTC laxatives as needed. Recently asked her transplant coordinator for lactulose. She is compliant with her transplant medications. No fever, chest pain, SOB, urinary or bowel movement changes. Patient AAOx3 and conversational. Denies concerns for confusion. Daughter at bedside mentions she has "slight disorientation" at times since ex-lap 9/2020.     Review of patient's allergies indicates:   Allergen Reactions    Codeine Itching     Other reaction(s): Itching    Lipitor [atorvastatin] Other (See Comments)     Other reaction(s): Muscle pain  Muscle cranmps    Morphine Itching     Other reaction(s): nausea and vomiting     Zoloft [sertraline] Other (See Comments)     Tremors/muscle spasms     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    Arnold-Chiari malformation     Depression     Esophageal stricture     Essential tremor     Hypertension     Left bundle branch block     Liver fibrosis, transplanted liver 10/2/2018    Suggested on fibroscan 10/2/18    Migraine without aura     MVP (mitral valve prolapse)     Non-rheumatic mitral regurgitation 10/1/2018    Non-rheumatic tricuspid valve insufficiency 10/1/2018    Osteoporosis     Recurrent urinary tract " infection     Seizures     Shingles 2007    SIADH (syndrome of inappropriate ADH production)     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    Tricuspid valve prolapse     Urolithiasis     Von Gierke disease     s/p liver transplant     Past Surgical History:   Procedure Laterality Date    APPENDECTOMY  6/22/2007    APPLICATION OF WOUND VACUUM-ASSISTED CLOSURE DEVICE N/A 9/18/2020    Procedure: APPLICATION, WOUND VAC;  Surgeon: Zain Decker MD;  Location: [facility] OR [address] [place] [address];  Service: General;  Laterality: N/A;    COLONOSCOPY  5/13/2008    internal hemorrhoids    CRANIOTOMY      ESOPHAGOGASTRODUODENOSCOPY N/A 9/3/2020    Procedure: EGD (ESOPHAGOGASTRODUODENOSCOPY);  Surgeon: Tyrel Vergara MD;  Location: [facility];  Service: Endoscopy;  Laterality: N/A;    EXPLORATORY LAPAROTOMY  2020    due to perforated stomach    LAMINECTOMY  3/2001    LIVER TRANSPLANT  9/23/2002    OSSICULAR RECONSTRUCTION  10/4/1995    RIGHT REPLACEMENT PROSTHESIS for cholesteatoma    THYMECTOMY  5/2/2007    TONSILLECTOMY, ADENOIDECTOMY  1/21/2004    TOTAL ABDOMINAL HYSTERECTOMY  3/31/1994     Family History   Problem Relation Age of Onset    Stroke Mother     Heart disease Mother     No Known Problems Father      Social History     Tobacco Use    Smoking status: Never Smoker    Smokeless tobacco: Never Used   Substance Use Topics    Alcohol use: No    Drug use: No     Review of Systems   Constitutional: Negative for fever.   HENT: Negative for sore throat.    Eyes: Negative for pain.   Respiratory: Negative for shortness of breath.    Cardiovascular: Negative for chest pain.   Gastrointestinal: Positive for abdominal distention, abdominal pain and nausea.   Genitourinary: Negative for dysuria.   Musculoskeletal: Negative for back pain.   Skin: Negative for rash.   Allergic/Immunologic: Positive for immunocompromised state.   Neurological: Negative for weakness.   Hematological: Does not bruise/bleed easily.        Physical Exam     Initial Vitals [03/24/22 1727]   BP Pulse Resp Temp SpO2   (!) 173/74 80 16 98.5 °F (36.9 °C) 100 %      MAP       --         Physical Exam    Constitutional: She appears well-developed and well-nourished. She is not diaphoretic. No distress.   HENT:   Head: Normocephalic and atraumatic.   Mouth/Throat: Oropharynx is clear and moist. No oropharyngeal exudate.   Eyes: Conjunctivae and EOM are normal. Pupils are equal, round, and reactive to light. No scleral icterus.   Neck: Neck supple.   Normal range of motion.  Cardiovascular: Normal rate and regular rhythm.   Pulmonary/Chest: Breath sounds normal. No respiratory distress. She has no wheezes.   Abdominal: Abdomen is soft. She exhibits distension. There is abdominal tenderness. There is no rebound.   Musculoskeletal:         General: No tenderness or edema. Normal range of motion.      Cervical back: Normal range of motion and neck supple.     Neurological: She is alert and oriented to person, place, and time. She has normal strength. No sensory deficit.   Skin: Skin is warm and dry. No rash noted. No erythema.   Psychiatric: She has a normal mood and affect.       ED Course   Procedures  Labs Reviewed   CBC W/ AUTO DIFFERENTIAL - Abnormal; Notable for the following components:       Result Value    Platelets 90 (*)     Lymph # 0.9 (*)     Eosinophil % 10.1 (*)     All other components within normal limits   COMPREHENSIVE METABOLIC PANEL - Abnormal; Notable for the following components:    Albumin 2.8 (*)     Total Bilirubin 1.5 (*)     Alkaline Phosphatase 701 (*)     AST 99 (*)     ALT 71 (*)     All other components within normal limits   LIPASE - Abnormal; Notable for the following components:    Lipase 64 (*)     All other components within normal limits   PROCALCITONIN - Abnormal; Notable for the following components:    Procalcitonin 0.25 (*)     All other components within normal limits   MAGNESIUM - Abnormal; Notable for the  following components:    Magnesium 1.5 (*)     All other components within normal limits   CBC W/ AUTO DIFFERENTIAL - Abnormal; Notable for the following components:    RBC 3.77 (*)     Hemoglobin 11.4 (*)     Hematocrit 34.9 (*)     Platelets 85 (*)     Eos # 0.7 (*)     Eosinophil % 14.6 (*)     All other components within normal limits   COMPREHENSIVE METABOLIC PANEL - Abnormal; Notable for the following components:    CO2 22 (*)     Glucose 65 (*)     Albumin 2.6 (*)     Total Bilirubin 1.6 (*)     Alkaline Phosphatase 572 (*)     AST 96 (*)     ALT 60 (*)     All other components within normal limits   ISTAT PROCEDURE - Abnormal; Notable for the following components:    POC Ionized Calcium 1.05 (*)     POC Hematocrit 34 (*)     All other components within normal limits   CULTURE, AEROBIC  (SPECIFY SOURCE)   CULTURE, ANAEROBIC   GRAM STAIN   URINALYSIS, REFLEX TO URINE CULTURE    Narrative:     Specimen Source->Urine   TACROLIMUS LEVEL   PROTIME-INR   PHOSPHORUS   ALBUMIN, PERITONEAL, PLEURAL FLUID OR KERRY DRAINAGE, IN-HOUSE   PROTEIN, PERITONEAL, PLEURAL FLUID OR KERRY DRAINAGE, IN-HOUSE   LDH, PERITONEAL, PLEURAL FLUID OR KERRY DRAINAGE, IN-HOUSE   WBC & DIFF, BODY FLUID   HEPATITIS PANEL, ACUTE   POCT GLUCOSE   ISTAT CHEM8          Imaging Results          US Doppler Liver Transplant Post (xpd) (Final result)  Result time 03/25/22 09:52:41   Procedure changed from US Liver with Doppler (xpd)     Final result by Gallo Flynn MD (03/25/22 09:52:41)                 Impression:      1. Cirrhosis of the liver allograft.  Ascites.  2. Elevated hepatic arterial resistive indices, a nonspecific finding which can be seen with chronic liver disease, edema, or rejection.    Electronically signed by resident: Ricardo Hardy  Date:    03/25/2022  Time:    09:42    Electronically signed by: Gallo Flynn MD  Date:    03/25/2022  Time:    09:52             Narrative:    EXAMINATION:  US DOPPLER LIVER TRANSPLANT POST  (XPD)    CLINICAL HISTORY:  cirrhosis, abdominal pain/distention;    TECHNIQUE:  Limited abdominal ultrasound of the transplant liver with Doppler evaluation.  Color and spectral Doppler were performed.    COMPARISON:  CT abdomen pelvis 03/24/2022    Ultrasound liver transplant 10/19/2021    FINDINGS:  Patient is status post orthotopic liver transplant 09/23/2002.  Nodular contour and coarsened heterogeneous echotexture of the liver allograft in keeping with cirrhosis.  The no focal hepatic lesions are seen.  No fluid collections.    There is abdominopelvic ascites.    The common duct measures 4-7 mm.  No dilated intrahepatic radicles are seen.    Color flow and spectral waveform analysis was performed.  The main portal vein, right portal vein, left portal vein, middle hepatic vein, right hepatic vein, left hepatic vein, and IVC are patent with proper directional flow.    Anastomosis site of the main hepatic artery demonstrates a peak systolic velocity 137 cm/sec (previously 57 cm/sec).    Main hepatic artery demonstrates resistive index 0.87 (previously 0.79) with normal waveform.    Left hepatic artery shows resistive index 0.81 (previously 0.83) with normal waveform.    Anterior branch of the right hepatic artery demonstrates resistive index 0.83 (previously 0.80) with normal waveform.    Posterior branch of the right hepatic artery demonstrates resistive index 0.81 (previously 0.68) with normal waveform.                               IR US Abdomen Limited (In process)  Result time 03/25/22 08:58:38   Procedure changed from IR Paracentesis with Imaging                CT Abdomen Pelvis With Contrast (Final result)  Result time 03/24/22 23:48:51    Final result by Clayton Messina MD (03/24/22 23:48:51)                 Impression:      1. Postoperative changes of liver transplant associated with nodular contour of the allograft suggestive of cirrhosis.  Correlation with LFTs recommended.  2. Splenomegaly and  scattered vascular collaterals.  3. Small volume of abdominopelvic ascites.  4. 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  5. Additional stable findings as above.    Electronically signed by resident: Oracio Harris  Date:    03/24/2022  Time:    22:14    Electronically signed by: Clayton Messina MD  Date:    03/24/2022  Time:    23:48             Narrative:    EXAMINATION:  CT ABDOMEN PELVIS WITH CONTRAST    CLINICAL HISTORY:  Abdominal abscess/infection suspected;    TECHNIQUE:  Axial images of the abdomen and pelvis were acquired  after the use of 75 cc Otvh135 IV contrast.  Coronal and sagittal reconstructions were also obtained    COMPARISON:  CT abdomen pelvis 10/28/2020 10/02/2020.  CT abdomen pelvis 09/03/2020.    FINDINGS:  Heart: Normal in size. No pericardial effusion.    Lungs: Visualized portions of the lungs are clear.    Liver: Postoperative changes of transplant normal in size and contour.  Nodular contour suggestive of cirrhosis.  Portal vein and SMV are patent.    Gallbladder: Surgically absent    Bile Ducts: No evidence of dilated ducts.    Pancreas: No mass or peripancreatic fat stranding.    Spleen: Enlarged measuring 14.0 cm.  Stable splenic fahad calcifications, likely represent calcified aneurysm.  Perisplenic and perigastric collaterals.    Stomach and duodenum: Small-sized hiatal hernia.    Adrenals: Unremarkable.    Kidneys/ Ureters: Normal in size and location. Stable calcifications within renal fahad bilaterally, largest 1.1 cm, likely representing renal artery aneurysms.  Partially calcified right renal hypodensity, similar to prior.  No hydronephrosis or nephrolithiasis. No ureteral dilatation.    Bladder: No evidence of wall thickening.    Reproductive organs: Uterus is surgically absent.  Left adnexal calcifications, similar to prior.    Bowel/Mesentery: Small bowel is normal in caliber with no evidence of obstruction.  Cecal suture line, could be  related to prior resection.  Stable wall thickening within the left colon, likely related to volume overload/portal colopathy.  Diffuse mesenteric edema.    Peritoneum: Small amount of abdominopelvic ascites.    Lymph nodes: No retroperitoneal lymphadenopathy.    Vasculature: No aneurysm. Mild calcific atherosclerosis.  Retroperitoneal collateral vessels.    Abdominal wall:  Diffuse body wall edema.    Bones: No acute fracture. No suspicious osseous lesions.                               X-Ray Chest PA And Lateral (Final result)  Result time 03/24/22 20:19:20    Final result by Clayton Messina MD (03/24/22 20:19:20)                 Impression:      No acute cardiopulmonary process.      Electronically signed by: Clayton Messina MD  Date:    03/24/2022  Time:    20:19             Narrative:    EXAMINATION:  XR CHEST PA AND LATERAL    CLINICAL HISTORY:  Unspecified abdominal pain    TECHNIQUE:  PA and lateral views of the chest were performed.    COMPARISON:  10/15/2020.    FINDINGS:  Sternotomy wires, similar to prior.    There is no consolidation, effusion, or pneumothorax.    Cardiomediastinal silhouette is unremarkable.    Regional osseous structures are unchanged.                                 Medications   sodium chloride 0.9% flush 10 mL (has no administration in time range)   sodium chloride 0.9% flush 10 mL (has no administration in time range)   albuterol-ipratropium 2.5 mg-0.5 mg/3 mL nebulizer solution 3 mL (has no administration in time range)   melatonin tablet 6 mg (has no administration in time range)   ondansetron disintegrating tablet 8 mg (8 mg Oral Given 3/25/22 0321)   prochlorperazine injection Soln 5 mg (5 mg Intravenous Given 3/25/22 1014)   simethicone chewable tablet 80 mg (has no administration in time range)   aluminum-magnesium hydroxide-simethicone 200-200-20 mg/5 mL suspension 30 mL (has no administration in time range)   acetaminophen tablet 650 mg (has no administration in time  range)   naloxone 0.4 mg/mL injection 0.02 mg (has no administration in time range)   glucose chewable tablet 16 g (16 g Oral Given 3/25/22 0500)   glucose chewable tablet 24 g (has no administration in time range)   dextrose 10% bolus 125 mL (has no administration in time range)   dextrose 10% bolus 250 mL (has no administration in time range)   glucagon (human recombinant) injection 1 mg (has no administration in time range)   levothyroxine tablet 75 mcg (75 mcg Oral Given 3/25/22 1000)   magnesium oxide tablet 400 mg (400 mg Oral Given 3/25/22 1000)   pantoprazole EC tablet 40 mg (40 mg Oral Given 3/25/22 1000)   lisinopriL tablet 10 mg (10 mg Oral Given 3/25/22 1000)   tacrolimus capsule 0.5 mg (0.5 mg Oral Given 3/25/22 1013)   tacrolimus capsule 0.5 mg (0.5 mg Oral Given 3/25/22 1014)   venlafaxine 24 hr capsule 150 mg (150 mg Oral Given 3/25/22 1014)   HYDROmorphone injection 1 mg (1 mg Intravenous Given 3/25/22 1000)   polyethylene glycol packet 17 g (17 g Oral Not Given 3/25/22 0900)   senna tablet 8.6 mg (has no administration in time range)   lactulose 20 gram/30 mL solution Soln 15 g (has no administration in time range)   ondansetron injection 4 mg (4 mg Intravenous Given 3/24/22 1914)   HYDROmorphone injection 0.5 mg (0.5 mg Intravenous Given 3/24/22 1929)   HYDROmorphone injection 0.5 mg (0.5 mg Intravenous Given 3/24/22 2131)   iohexoL (OMNIPAQUE 350) injection 75 mL (75 mLs Intravenous Given 3/24/22 2143)   HYDROmorphone injection 0.5 mg (0.5 mg Intravenous Given 3/25/22 0320)   HYDROmorphone injection 0.5 mg (0.5 mg Intravenous Given 3/25/22 0345)   furosemide injection 40 mg (40 mg Intravenous Given 3/25/22 0500)     Medical Decision Making:   History:   Old Medical Records: I decided to obtain old medical records.  Old Records Summarized: records from clinic visits and records from previous admission(s).  Clinical Tests:   Lab Tests: Ordered and Reviewed  Radiological Study: Ordered and  Reviewed  Other:   I have discussed this case with another health care provider.       <> Summary of the Discussion: Hospital Medicine       APC / Resident Notes:   51 y.o. 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     DDx includes but not limited to hypervolemia, SBP, decompensated liver failure, transplant rejection, intra-abdominal abscess, constipation, bowel obstruction, surgical adhesions, hepatorenal syndrome. No confusion and do not suspect hepatic encephalopathy.     8:45 PM - Carroll Felipe PA-C  Liver enzymes elevated similar to prior values. MELD score 10, most recently 12. CT abd/pelv in process. Patient signed out to my colleague at the end of my shift pending CT scan and final disposition. Anticipate patient will at least require therapeutic paracentesis if ascites is present given her significant pain. Patient expresses understanding and agreeable to the plan. I have discussed the care of this patient with my supervising physician.         ED Course as of 03/25/22 1249   Fri Mar 25, 2022   0046 CT negative, will place in observation.  Discussed with HM [DC]      ED Course User Index  [DC] Kody Hernandez MD             Clinical Impression:   Final diagnoses:  [R10.9] Abdominal pain (Primary)  [Z94.4] Liver transplant recipient  [R14.0] Abdominal distention          ED Disposition Condition    Observation               Carroll Felipe PA-C  03/24/22 0235       Carroll Felipe PA-C  03/25/22 1253

## 2022-03-24 NOTE — TELEPHONE ENCOUNTER
Returned call to patient she reported severe abdominal pain, swelling in abdomen, nausea and not making much stool.  I recommended she go to ER for evaluation.  She agreed with the plan.      ----- Message from Hernandez Hester sent at 3/24/2022 12:34 PM CDT -----  Regarding: Rx  Pt would like to see if she is able to get a Rx for lactulose    Call # [phone]      [id number] - [place] [id number] [place] [address] AT [facility] [address] [address]  Phone: [phone] Fax: [phone]

## 2022-03-24 NOTE — ED TRIAGE NOTES
Pt with hx of liver tx to ED with complaints of RUQ pain, constipation x 2 days and abdominal distention. Family at bedside reports some increased confusion. Pt reports associated nausea.    Patient identifiers for Elda Hernandez 51 y.o. female checked and correct.  Chief Complaint   Patient presents with    Abdominal Pain     Liver transplant, + abd distention and pain, + nausea.     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    Arnold-Chiari malformation     Depression     Esophageal stricture     Essential tremor     Hypertension     Left bundle branch block     Liver fibrosis, transplanted liver 10/2/2018    Suggested on fibroscan 10/2/18    Migraine without aura     MVP (mitral valve prolapse)     Non-rheumatic mitral regurgitation 10/1/2018    Non-rheumatic tricuspid valve insufficiency 10/1/2018    Osteoporosis     Recurrent urinary tract infection     Seizures     Shingles 2007    SIADH (syndrome of inappropriate ADH production)     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    Tricuspid valve prolapse     Urolithiasis     Von Gierke disease     s/p liver transplant     Allergies reported:   Review of patient's allergies indicates:   Allergen Reactions    Codeine Itching     Other reaction(s): Itching    Lipitor [atorvastatin] Other (See Comments)     Other reaction(s): Muscle pain  Muscle cranmps    Morphine Itching     Other reaction(s): nausea and vomiting     Zoloft [sertraline] Other (See Comments)     Tremors/muscle spasms         LOC: Patient is awake, alert, and aware of environment with an appropriate affect. Patient is oriented x 4 and speaking appropriately.  APPEARANCE: Patient resting comfortably and in no acute distress. Patient is clean and well groomed, patient's clothing is properly fastened.  HEENT: mm pink, moist, intact.   SKIN: The skin is warm and dry. Patient has normal skin turgor and moist mucus membranes.   MUSKULOSKELETAL:  Patient is moving all extremities well, no obvious deformities noted. Pulses intact.   RESPIRATORY: Airway is open and patent. Respirations are spontaneous and non-labored with normal effort and rate.  CARDIAC: Patient has a normal rate and rhythm. No peripheral edema noted.   ABDOMEN: Abdomen distended, firm to touch. Tender.   NEUROLOGICAL: pupils 4mm, PERRL. Facial expression is symmetrical. Hand grasps are equal bilaterally. Normal sensation in all extremities when touched with finger.

## 2022-03-24 NOTE — ED NOTES
I-STAT Chem-8+ Results:   Value Reference Range   Sodium 137 136-145 mmol/L   Potassium  3.5 3.5-5.1 mmol/L   Chloride 103  mmol/L   Ionized Calcium 1.05 1.06-1.42 mmol/L   CO2 (measured) 25 23-29 mmol/L   Glucose 82  mg/dL   BUN 13 6-30 mg/dL   Creatinine 0.7 0.5-1.4 mg/dL   Hematocrit 34 36-54%

## 2022-03-24 NOTE — FIRST PROVIDER EVALUATION
Emergency Department TeleTriage Encounter Note      CHIEF COMPLAINT    Chief Complaint   Patient presents with    Abdominal Pain     Liver transplant, + abd distention and pain, + nausea.       VITAL SIGNS   Initial Vitals [03/24/22 1727]   BP Pulse Resp Temp SpO2   (!) 173/74 80 16 98.5 °F (36.9 °C) 100 %      MAP       --            ALLERGIES    Review of patient's allergies indicates:   Allergen Reactions    Codeine Itching     Other reaction(s): Itching    Lipitor [atorvastatin] Other (See Comments)     Other reaction(s): Muscle pain  Muscle cranmps    Morphine Itching     Other reaction(s): nausea and vomiting     Zoloft [sertraline] Other (See Comments)     Tremors/muscle spasms       PROVIDER TRIAGE NOTE  This is a teletriage evaluation of a 51 y.o. female presenting to the ED with c/o generalized abd pain with distention. Also reports nausea. Liver txpt 9/2002. +Nausea, -Vomiting. Limited physical exam via telehealth: The patient is awake, alert, answering questions appropriately and is not in respiratory distress. Initial orders will be placed and care will be transferred to an alternate provider when patient is roomed for a full evaluation. Any additional orders and the final disposition will be determined by that provider.         ORDERS  Labs Reviewed   CBC W/ AUTO DIFFERENTIAL   COMPREHENSIVE METABOLIC PANEL   LIPASE   URINALYSIS, REFLEX TO URINE CULTURE   TACROLIMUS LEVEL   ISTAT CHEM8       ED Orders (720h ago, onward)    Start Ordered     Status Ordering Provider    03/24/22 1803 03/24/22 1802  Vital signs  Every 2 hours         Ordered CARMEN TAYLOR    03/24/22 1803 03/24/22 1802  Diet NPO  Diet effective now         Ordered CARMEN TAYLOR    03/24/22 1803 03/24/22 1802  Insert peripheral IV  Once         Ordered CARMEN TAYLOR    03/24/22 1803 03/24/22 1802  CBC W/ AUTO DIFFERENTIAL  STAT         Ordered CARMEN TAYLOR    03/24/22 1803 03/24/22 1802  Comp. Metabolic Panel  STAT          Ordered CARMEN TAYLOR P.    03/24/22 1803 03/24/22 1802  ISTAT CHEM8  Once         Ordered CARMEN TAYLOR P.    03/24/22 1803 03/24/22 1802  Lipase  STAT         Ordered CARMEN TAYLOR.    03/24/22 1803 03/24/22 1802  Urinalysis, Reflex to Urine Culture Urine, Clean Catch  STAT         Ordered CARMEN TAYLOR.    03/24/22 1803 03/24/22 1802  Tacrolimus level  Once         Ordered CARMEN TAYLOR            Virtual Visit Note: The provider triage portion of this emergency department evaluation and documentation was performed via [employer], a HIPAA-compliant telemedicine application, in concert with a tele-presenter in the room. A face to face patient evaluation with one of my colleagues will occur once the patient is placed in an emergency department room.      DISCLAIMER: This note was prepared with [employer] voice recognition transcription software. Garbled syntax, mangled pronouns, and other bizarre constructions may be attributed to that software system.

## 2022-03-25 PROBLEM — K74.60 CIRRHOSIS OF LIVER WITH ASCITES: Status: ACTIVE | Noted: 2022-03-25

## 2022-03-25 PROBLEM — R18.8 CIRRHOSIS OF LIVER WITH ASCITES: Status: ACTIVE | Noted: 2022-03-25

## 2022-03-25 PROBLEM — R16.1 SPLENOMEGALY: Status: ACTIVE | Noted: 2022-03-25

## 2022-03-25 LAB
ABO + RH BLD: NORMAL
ALBUMIN SERPL BCP-MCNC: 2.6 G/DL (ref 3.5–5.2)
ALP SERPL-CCNC: 572 U/L (ref 55–135)
ALT SERPL W/O P-5'-P-CCNC: 60 U/L (ref 10–44)
ANION GAP SERPL CALC-SCNC: 10 MMOL/L (ref 8–16)
ANION GAP SERPL CALC-SCNC: 12 MMOL/L (ref 8–16)
ANION GAP SERPL CALC-SCNC: 13 MMOL/L (ref 8–16)
AST SERPL-CCNC: 96 U/L (ref 10–40)
BASOPHILS # BLD AUTO: 0.07 K/UL (ref 0–0.2)
BASOPHILS NFR BLD: 1.5 % (ref 0–1.9)
BILIRUB SERPL-MCNC: 1.6 MG/DL (ref 0.1–1)
BLD GP AB SCN CELLS X3 SERPL QL: NORMAL
BUN SERPL-MCNC: 12 MG/DL (ref 6–20)
BUN SERPL-MCNC: 14 MG/DL (ref 6–20)
BUN SERPL-MCNC: 16 MG/DL (ref 6–20)
CALCIUM SERPL-MCNC: 8.1 MG/DL (ref 8.7–10.5)
CALCIUM SERPL-MCNC: 8.7 MG/DL (ref 8.7–10.5)
CALCIUM SERPL-MCNC: 8.8 MG/DL (ref 8.7–10.5)
CHLORIDE SERPL-SCNC: 103 MMOL/L (ref 95–110)
CHLORIDE SERPL-SCNC: 104 MMOL/L (ref 95–110)
CHLORIDE SERPL-SCNC: 106 MMOL/L (ref 95–110)
CO2 SERPL-SCNC: 22 MMOL/L (ref 23–29)
CO2 SERPL-SCNC: 25 MMOL/L (ref 23–29)
CO2 SERPL-SCNC: 26 MMOL/L (ref 23–29)
CREAT SERPL-MCNC: 0.7 MG/DL (ref 0.5–1.4)
CREAT SERPL-MCNC: 0.8 MG/DL (ref 0.5–1.4)
CREAT SERPL-MCNC: 0.8 MG/DL (ref 0.5–1.4)
DIFFERENTIAL METHOD: ABNORMAL
EOSINOPHIL # BLD AUTO: 0.7 K/UL (ref 0–0.5)
EOSINOPHIL NFR BLD: 14.6 % (ref 0–8)
ERYTHROCYTE [DISTWIDTH] IN BLOOD BY AUTOMATED COUNT: 14.3 % (ref 11.5–14.5)
EST. GFR  (AFRICAN AMERICAN): >60 ML/MIN/1.73 M^2
EST. GFR  (NON AFRICAN AMERICAN): >60 ML/MIN/1.73 M^2
GLUCOSE SERPL-MCNC: 65 MG/DL (ref 70–110)
GLUCOSE SERPL-MCNC: 66 MG/DL (ref 70–110)
GLUCOSE SERPL-MCNC: 70 MG/DL (ref 70–110)
HCT VFR BLD AUTO: 34.9 % (ref 37–48.5)
HGB BLD-MCNC: 11.4 G/DL (ref 12–16)
IMM GRANULOCYTES # BLD AUTO: 0.01 K/UL (ref 0–0.04)
IMM GRANULOCYTES NFR BLD AUTO: 0.2 % (ref 0–0.5)
LYMPHOCYTES # BLD AUTO: 1.1 K/UL (ref 1–4.8)
LYMPHOCYTES NFR BLD: 24.8 % (ref 18–48)
MAGNESIUM SERPL-MCNC: 1.5 MG/DL (ref 1.6–2.6)
MCH RBC QN AUTO: 30.2 PG (ref 27–31)
MCHC RBC AUTO-ENTMCNC: 32.7 G/DL (ref 32–36)
MCV RBC AUTO: 93 FL (ref 82–98)
MONOCYTES # BLD AUTO: 0.6 K/UL (ref 0.3–1)
MONOCYTES NFR BLD: 12 % (ref 4–15)
NEUTROPHILS # BLD AUTO: 2.2 K/UL (ref 1.8–7.7)
NEUTROPHILS NFR BLD: 46.9 % (ref 38–73)
NRBC BLD-RTO: 0 /100 WBC
PHOSPHATE SERPL-MCNC: 2.9 MG/DL (ref 2.7–4.5)
PLATELET # BLD AUTO: 85 K/UL (ref 150–450)
PMV BLD AUTO: 12 FL (ref 9.2–12.9)
POCT GLUCOSE: 93 MG/DL (ref 70–110)
POTASSIUM SERPL-SCNC: 3.3 MMOL/L (ref 3.5–5.1)
POTASSIUM SERPL-SCNC: 3.8 MMOL/L (ref 3.5–5.1)
POTASSIUM SERPL-SCNC: 4.1 MMOL/L (ref 3.5–5.1)
PROT SERPL-MCNC: 6.3 G/DL (ref 6–8.4)
RBC # BLD AUTO: 3.77 M/UL (ref 4–5.4)
SODIUM SERPL-SCNC: 139 MMOL/L (ref 136–145)
SODIUM SERPL-SCNC: 140 MMOL/L (ref 136–145)
SODIUM SERPL-SCNC: 142 MMOL/L (ref 136–145)
TACROLIMUS BLD-MCNC: 6.9 NG/ML (ref 5–15)
WBC # BLD AUTO: 4.6 K/UL (ref 3.9–12.7)

## 2022-03-25 PROCEDURE — 99220 PR INITIAL OBSERVATION CARE,LEVL III: CPT | Mod: ,,, | Performed by: PHYSICIAN ASSISTANT

## 2022-03-25 PROCEDURE — 99220 PR INITIAL OBSERVATION CARE,LEVL III: ICD-10-PCS | Mod: ,,, | Performed by: PHYSICIAN ASSISTANT

## 2022-03-25 PROCEDURE — 82962 GLUCOSE BLOOD TEST: CPT

## 2022-03-25 PROCEDURE — 25000003 PHARM REV CODE 250: Performed by: PHYSICIAN ASSISTANT

## 2022-03-25 PROCEDURE — 96376 TX/PRO/DX INJ SAME DRUG ADON: CPT

## 2022-03-25 PROCEDURE — 86850 RBC ANTIBODY SCREEN: CPT | Performed by: INTERNAL MEDICINE

## 2022-03-25 PROCEDURE — 25000003 PHARM REV CODE 250: Performed by: INTERNAL MEDICINE

## 2022-03-25 PROCEDURE — 80048 BASIC METABOLIC PNL TOTAL CA: CPT | Mod: 91 | Performed by: PHYSICIAN ASSISTANT

## 2022-03-25 PROCEDURE — 63600175 PHARM REV CODE 636 W HCPCS: Performed by: PHYSICIAN ASSISTANT

## 2022-03-25 PROCEDURE — 96366 THER/PROPH/DIAG IV INF ADDON: CPT

## 2022-03-25 PROCEDURE — 94760 N-INVAS EAR/PLS OXIMETRY 1: CPT

## 2022-03-25 PROCEDURE — 80053 COMPREHEN METABOLIC PANEL: CPT | Performed by: PHYSICIAN ASSISTANT

## 2022-03-25 PROCEDURE — 63600175 PHARM REV CODE 636 W HCPCS: Performed by: INTERNAL MEDICINE

## 2022-03-25 PROCEDURE — 36415 COLL VENOUS BLD VENIPUNCTURE: CPT | Performed by: PHYSICIAN ASSISTANT

## 2022-03-25 PROCEDURE — G0378 HOSPITAL OBSERVATION PER HR: HCPCS

## 2022-03-25 PROCEDURE — 96375 TX/PRO/DX INJ NEW DRUG ADDON: CPT

## 2022-03-25 PROCEDURE — 80048 BASIC METABOLIC PNL TOTAL CA: CPT | Performed by: INTERNAL MEDICINE

## 2022-03-25 PROCEDURE — 80074 ACUTE HEPATITIS PANEL: CPT | Performed by: PHYSICIAN ASSISTANT

## 2022-03-25 PROCEDURE — 85025 COMPLETE CBC W/AUTO DIFF WBC: CPT | Performed by: PHYSICIAN ASSISTANT

## 2022-03-25 PROCEDURE — 83735 ASSAY OF MAGNESIUM: CPT | Performed by: PHYSICIAN ASSISTANT

## 2022-03-25 PROCEDURE — 84100 ASSAY OF PHOSPHORUS: CPT | Performed by: PHYSICIAN ASSISTANT

## 2022-03-25 PROCEDURE — 96365 THER/PROPH/DIAG IV INF INIT: CPT

## 2022-03-25 RX ORDER — IPRATROPIUM BROMIDE AND ALBUTEROL SULFATE 2.5; .5 MG/3ML; MG/3ML
3 SOLUTION RESPIRATORY (INHALATION) EVERY 4 HOURS PRN
Status: DISCONTINUED | OUTPATIENT
Start: 2022-03-25 | End: 2022-03-27 | Stop reason: HOSPADM

## 2022-03-25 RX ORDER — LACTULOSE 10 G/15ML
15 SOLUTION ORAL 3 TIMES DAILY
Status: DISCONTINUED | OUTPATIENT
Start: 2022-03-25 | End: 2022-03-25

## 2022-03-25 RX ORDER — SODIUM CHLORIDE 0.9 % (FLUSH) 0.9 %
10 SYRINGE (ML) INJECTION
Status: DISCONTINUED | OUTPATIENT
Start: 2022-03-25 | End: 2022-03-27 | Stop reason: HOSPADM

## 2022-03-25 RX ORDER — TALC
6 POWDER (GRAM) TOPICAL NIGHTLY PRN
Status: DISCONTINUED | OUTPATIENT
Start: 2022-03-25 | End: 2022-03-27 | Stop reason: HOSPADM

## 2022-03-25 RX ORDER — LACTULOSE 10 G/15ML
20 SOLUTION ORAL 3 TIMES DAILY
Status: DISCONTINUED | OUTPATIENT
Start: 2022-03-25 | End: 2022-03-27 | Stop reason: HOSPADM

## 2022-03-25 RX ORDER — IBUPROFEN 200 MG
24 TABLET ORAL
Status: DISCONTINUED | OUTPATIENT
Start: 2022-03-25 | End: 2022-03-27 | Stop reason: HOSPADM

## 2022-03-25 RX ORDER — LANOLIN ALCOHOL/MO/W.PET/CERES
400 CREAM (GRAM) TOPICAL 2 TIMES DAILY
Status: DISCONTINUED | OUTPATIENT
Start: 2022-03-25 | End: 2022-03-27 | Stop reason: HOSPADM

## 2022-03-25 RX ORDER — IBUPROFEN 200 MG
16 TABLET ORAL
Status: DISCONTINUED | OUTPATIENT
Start: 2022-03-25 | End: 2022-03-27 | Stop reason: HOSPADM

## 2022-03-25 RX ORDER — FUROSEMIDE 10 MG/ML
40 INJECTION INTRAMUSCULAR; INTRAVENOUS ONCE
Status: COMPLETED | OUTPATIENT
Start: 2022-03-25 | End: 2022-03-25

## 2022-03-25 RX ORDER — ONDANSETRON 8 MG/1
8 TABLET, ORALLY DISINTEGRATING ORAL EVERY 8 HOURS PRN
Status: DISCONTINUED | OUTPATIENT
Start: 2022-03-25 | End: 2022-03-27 | Stop reason: HOSPADM

## 2022-03-25 RX ORDER — SODIUM CHLORIDE 0.9 % (FLUSH) 0.9 %
10 SYRINGE (ML) INJECTION EVERY 8 HOURS PRN
Status: DISCONTINUED | OUTPATIENT
Start: 2022-03-25 | End: 2022-03-27 | Stop reason: HOSPADM

## 2022-03-25 RX ORDER — MAGNESIUM SULFATE HEPTAHYDRATE 40 MG/ML
2 INJECTION, SOLUTION INTRAVENOUS ONCE
Status: COMPLETED | OUTPATIENT
Start: 2022-03-25 | End: 2022-03-25

## 2022-03-25 RX ORDER — PANTOPRAZOLE SODIUM 40 MG/1
40 TABLET, DELAYED RELEASE ORAL DAILY
Status: DISCONTINUED | OUTPATIENT
Start: 2022-03-25 | End: 2022-03-27 | Stop reason: HOSPADM

## 2022-03-25 RX ORDER — HYDROMORPHONE HYDROCHLORIDE 1 MG/ML
0.5 INJECTION, SOLUTION INTRAMUSCULAR; INTRAVENOUS; SUBCUTANEOUS ONCE
Status: COMPLETED | OUTPATIENT
Start: 2022-03-25 | End: 2022-03-25

## 2022-03-25 RX ORDER — SENNOSIDES 8.6 MG/1
8.6 TABLET ORAL NIGHTLY
Status: DISCONTINUED | OUTPATIENT
Start: 2022-03-25 | End: 2022-03-27 | Stop reason: HOSPADM

## 2022-03-25 RX ORDER — LEVOTHYROXINE SODIUM 75 UG/1
75 TABLET ORAL DAILY
Status: DISCONTINUED | OUTPATIENT
Start: 2022-03-25 | End: 2022-03-27 | Stop reason: HOSPADM

## 2022-03-25 RX ORDER — LISINOPRIL 10 MG/1
10 TABLET ORAL DAILY
Refills: 0 | Status: DISCONTINUED | OUTPATIENT
Start: 2022-03-25 | End: 2022-03-27 | Stop reason: HOSPADM

## 2022-03-25 RX ORDER — POLYETHYLENE GLYCOL 3350 17 G/17G
17 POWDER, FOR SOLUTION ORAL DAILY
Status: DISCONTINUED | OUTPATIENT
Start: 2022-03-25 | End: 2022-03-25

## 2022-03-25 RX ORDER — SIMETHICONE 80 MG
1 TABLET,CHEWABLE ORAL 4 TIMES DAILY PRN
Status: DISCONTINUED | OUTPATIENT
Start: 2022-03-25 | End: 2022-03-27 | Stop reason: HOSPADM

## 2022-03-25 RX ORDER — PROCHLORPERAZINE EDISYLATE 5 MG/ML
5 INJECTION INTRAMUSCULAR; INTRAVENOUS EVERY 6 HOURS PRN
Status: DISCONTINUED | OUTPATIENT
Start: 2022-03-25 | End: 2022-03-27 | Stop reason: HOSPADM

## 2022-03-25 RX ORDER — MAG HYDROX/ALUMINUM HYD/SIMETH 200-200-20
30 SUSPENSION, ORAL (FINAL DOSE FORM) ORAL 4 TIMES DAILY PRN
Status: DISCONTINUED | OUTPATIENT
Start: 2022-03-25 | End: 2022-03-27 | Stop reason: HOSPADM

## 2022-03-25 RX ORDER — ACETAMINOPHEN 325 MG/1
650 TABLET ORAL EVERY 4 HOURS PRN
Status: DISCONTINUED | OUTPATIENT
Start: 2022-03-25 | End: 2022-03-27 | Stop reason: HOSPADM

## 2022-03-25 RX ORDER — GLUCAGON 1 MG
1 KIT INJECTION
Status: DISCONTINUED | OUTPATIENT
Start: 2022-03-25 | End: 2022-03-27 | Stop reason: HOSPADM

## 2022-03-25 RX ORDER — TALC
6 POWDER (GRAM) TOPICAL NIGHTLY PRN
Status: DISCONTINUED | OUTPATIENT
Start: 2022-03-25 | End: 2022-03-25

## 2022-03-25 RX ORDER — POLYETHYLENE GLYCOL 3350 17 G/17G
17 POWDER, FOR SOLUTION ORAL 2 TIMES DAILY
Status: DISCONTINUED | OUTPATIENT
Start: 2022-03-25 | End: 2022-03-27 | Stop reason: HOSPADM

## 2022-03-25 RX ORDER — SENNOSIDES 8.6 MG/1
8.6 TABLET ORAL DAILY PRN
Status: DISCONTINUED | OUTPATIENT
Start: 2022-03-25 | End: 2022-03-27 | Stop reason: HOSPADM

## 2022-03-25 RX ORDER — POLYETHYLENE GLYCOL 3350 17 G/17G
17 POWDER, FOR SOLUTION ORAL DAILY PRN
Status: DISCONTINUED | OUTPATIENT
Start: 2022-03-25 | End: 2022-03-25

## 2022-03-25 RX ORDER — TACROLIMUS 0.5 MG/1
0.5 CAPSULE ORAL 2 TIMES DAILY
Status: DISCONTINUED | OUTPATIENT
Start: 2022-03-25 | End: 2022-03-26

## 2022-03-25 RX ORDER — TACROLIMUS 0.5 MG/1
0.5 CAPSULE ORAL EVERY MORNING
Status: DISCONTINUED | OUTPATIENT
Start: 2022-03-25 | End: 2022-03-26

## 2022-03-25 RX ORDER — VENLAFAXINE HYDROCHLORIDE 150 MG/1
150 CAPSULE, EXTENDED RELEASE ORAL DAILY
Status: DISCONTINUED | OUTPATIENT
Start: 2022-03-25 | End: 2022-03-27 | Stop reason: HOSPADM

## 2022-03-25 RX ORDER — NALOXONE HCL 0.4 MG/ML
0.02 VIAL (ML) INJECTION
Status: DISCONTINUED | OUTPATIENT
Start: 2022-03-25 | End: 2022-03-27 | Stop reason: HOSPADM

## 2022-03-25 RX ORDER — HYDROMORPHONE HYDROCHLORIDE 1 MG/ML
1 INJECTION, SOLUTION INTRAMUSCULAR; INTRAVENOUS; SUBCUTANEOUS EVERY 4 HOURS PRN
Status: DISCONTINUED | OUTPATIENT
Start: 2022-03-25 | End: 2022-03-27 | Stop reason: HOSPADM

## 2022-03-25 RX ORDER — HYDROCORTISONE 25 MG/G
CREAM TOPICAL
COMMUNITY

## 2022-03-25 RX ADMIN — MAGNESIUM SULFATE HEPTAHYDRATE 2 G: 2 INJECTION, SOLUTION INTRAVENOUS at 02:03

## 2022-03-25 RX ADMIN — PROCHLORPERAZINE EDISYLATE 5 MG: 5 INJECTION INTRAMUSCULAR; INTRAVENOUS at 10:03

## 2022-03-25 RX ADMIN — HYDROMORPHONE HYDROCHLORIDE 0.5 MG: 1 INJECTION, SOLUTION INTRAMUSCULAR; INTRAVENOUS; SUBCUTANEOUS at 03:03

## 2022-03-25 RX ADMIN — LEVOTHYROXINE SODIUM 75 MCG: 0.03 TABLET ORAL at 10:03

## 2022-03-25 RX ADMIN — HYDROMORPHONE HYDROCHLORIDE 1 MG: 1 INJECTION, SOLUTION INTRAMUSCULAR; INTRAVENOUS; SUBCUTANEOUS at 09:03

## 2022-03-25 RX ADMIN — VENLAFAXINE HYDROCHLORIDE 150 MG: 150 CAPSULE, EXTENDED RELEASE ORAL at 10:03

## 2022-03-25 RX ADMIN — LACTULOSE 20 G: 20 SOLUTION ORAL at 02:03

## 2022-03-25 RX ADMIN — Medication 400 MG: at 09:03

## 2022-03-25 RX ADMIN — MELATONIN TAB 3 MG 6 MG: 3 TAB at 09:03

## 2022-03-25 RX ADMIN — TACROLIMUS 0.5 MG: 0.5 CAPSULE ORAL at 09:03

## 2022-03-25 RX ADMIN — SENNOSIDES 8.6 MG: 8.6 TABLET ORAL at 09:03

## 2022-03-25 RX ADMIN — ONDANSETRON 8 MG: 8 TABLET, ORALLY DISINTEGRATING ORAL at 05:03

## 2022-03-25 RX ADMIN — TACROLIMUS 0.5 MG: 0.5 CAPSULE ORAL at 10:03

## 2022-03-25 RX ADMIN — LISINOPRIL 10 MG: 5 TABLET ORAL at 10:03

## 2022-03-25 RX ADMIN — HYDROMORPHONE HYDROCHLORIDE 1 MG: 1 INJECTION, SOLUTION INTRAMUSCULAR; INTRAVENOUS; SUBCUTANEOUS at 05:03

## 2022-03-25 RX ADMIN — PANTOPRAZOLE SODIUM 40 MG: 40 TABLET, DELAYED RELEASE ORAL at 10:03

## 2022-03-25 RX ADMIN — FUROSEMIDE 40 MG: 10 INJECTION, SOLUTION INTRAMUSCULAR; INTRAVENOUS at 05:03

## 2022-03-25 RX ADMIN — Medication 16 G: at 05:03

## 2022-03-25 RX ADMIN — Medication 400 MG: at 10:03

## 2022-03-25 RX ADMIN — ONDANSETRON 8 MG: 8 TABLET, ORALLY DISINTEGRATING ORAL at 03:03

## 2022-03-25 RX ADMIN — LACTULOSE 20 G: 20 SOLUTION ORAL at 09:03

## 2022-03-25 RX ADMIN — HYDROMORPHONE HYDROCHLORIDE 1 MG: 1 INJECTION, SOLUTION INTRAMUSCULAR; INTRAVENOUS; SUBCUTANEOUS at 10:03

## 2022-03-25 RX ADMIN — SIMETHICONE 80 MG: 80 TABLET, CHEWABLE ORAL at 09:03

## 2022-03-25 NOTE — H&P
[facility] - Emergency Dept  [facility]  History & Physical    Patient Name: Elda Hernandez  MRN: [id number]  Patient Class: OP- Observation  Admission Date: 3/24/2022  Attending Physician: Raisa Fernandez MD   Primary Care Provider: David Lorenzo MD         Patient information was obtained from patient, past medical records and ER records.     Subjective:     Principal Problem:Cirrhosis of liver with ascites    Chief Complaint:   Chief Complaint   Patient presents with    Abdominal Pain     Liver transplant, + abd distention and pain, + nausea.        HPI: Elda Hernandez is a 51 y.o. 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 Patient reports abdominal pain and distention started approximately 4 days ago and has progressively worsened. She initially thought it was due to constipation so she took Doculax and had a small bowel movement with no relief in the pain. The pain is mostly over the RUQ and she feels like her liver is more prominent than usual. She asked her transplant coordinator for lactulose and they told her to present to the ED for further eval. She endorses nausea. She denies fevers, chills, chest pain, SOB, dysuria, melena, and hematochezia. No confusion.       ED: VSSAF. Alk phos 701, Tbili 1.5, AST 99, ALT 71, Lipase 64. UA without infection. CT abdomen/pelvis with small amount of ascites, splenomegaly, diffuse body wall edema, no evidence of obstruction.       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    Arnold-Chiari malformation     Depression     Esophageal stricture     Essential tremor     Hypertension     Left bundle branch block     Liver fibrosis, transplanted liver 10/2/2018    Suggested on fibroscan 10/2/18    Migraine without aura      MVP (mitral valve prolapse)     Non-rheumatic mitral regurgitation 10/1/2018    Non-rheumatic tricuspid valve insufficiency 10/1/2018    Osteoporosis     Recurrent urinary tract infection     Seizures     Shingles 2007    SIADH (syndrome of inappropriate ADH production)     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    Tricuspid valve prolapse     Urolithiasis     Von Gierke disease     s/p liver transplant       Past Surgical History:   Procedure Laterality Date    APPENDECTOMY  6/22/2007    APPLICATION OF WOUND VACUUM-ASSISTED CLOSURE DEVICE N/A 9/18/2020    Procedure: APPLICATION, WOUND VAC;  Surgeon: Zain Decker MD;  Location: [address];  Service: General;  Laterality: N/A;    COLONOSCOPY  5/13/2008    internal hemorrhoids    CRANIOTOMY      ESOPHAGOGASTRODUODENOSCOPY N/A 9/3/2020    Procedure: EGD (ESOPHAGOGASTRODUODENOSCOPY);  Surgeon: Tyrel Vergara MD;  Location: [facility];  Service: Endoscopy;  Laterality: N/A;    EXPLORATORY LAPAROTOMY  2020    due to perforated stomach    LAMINECTOMY  3/2001    LIVER TRANSPLANT  9/23/2002    OSSICULAR RECONSTRUCTION  10/4/1995    RIGHT REPLACEMENT PROSTHESIS for cholesteatoma    THYMECTOMY  5/2/2007    TONSILLECTOMY, ADENOIDECTOMY  1/21/2004    TOTAL ABDOMINAL HYSTERECTOMY  3/31/1994       Review of patient's allergies indicates:   Allergen Reactions    Codeine Itching     Other reaction(s): Itching    Lipitor [atorvastatin] Other (See Comments)     Other reaction(s): Muscle pain  Muscle cranmps    Morphine Itching     Other reaction(s): nausea and vomiting     Zoloft [sertraline] Other (See Comments)     Tremors/muscle spasms       No current facility-administered medications on file prior to encounter.     Current Outpatient Medications on File Prior to Encounter   Medication Sig    acetaminophen (TYLENOL) 325 MG tablet Take 2 tablets (650 mg total) by mouth every 6 (six) hours as needed.    butalbital-aspirin-caffeine  -40 mg (FIORINAL) -40 mg Cap TAKE 2 CAPSULES BY MOUTH EVERY 6 HOURS AS NEEDED FOR HEADACHE    calcium carbonate (TUMS) 200 mg calcium (500 mg) chewable tablet Take 1 tablet (500 mg total) by mouth 2 (two) times daily as needed.    clonazePAM (KLONOPIN) 0.5 MG tablet Take 1 tablet (0.5 mg total) by mouth every evening.    flu vacc [id number] 6mos up,PF, (FLUARIX QUAD 5913-4514, PF,) 60 mcg (15 mcg x 4)/0.5 mL Syrg Inject 0.5 mLs into the muscle.    gentian violet 2 % topical solution Use as directed 0.5 mLs in the mouth or throat 4 (four) times daily. (Patient not taking: No sig reported)    levothyroxine (SYNTHROID) 75 MCG tablet TAKE 1 TABLET(75 MCG) BY MOUTH EVERY DAY    magnesium oxide (MAG-OX) 400 mg (241.3 mg magnesium) tablet TAKE 1 TABLET(400 MG) BY MOUTH TWICE DAILY    multivitamin capsule Take 1 capsule by mouth once daily.    ondansetron (ZOFRAN-ODT) 4 MG TbDL DISSOLVE 1 TABLET(4 MG) ON THE TONGUE EVERY 6 HOURS AS NEEDED    pantoprazole (PROTONIX) 40 MG tablet Take 1 tablet (40 mg total) by mouth once daily.    promethazine (PHENERGAN) 25 MG tablet Take 1 tablet (25 mg total) by mouth every 4 (four) hours as needed (if unrelieved by zofran).    ramipriL (ALTACE) 2.5 MG capsule TAKE 1 CAPSULE(2.5 MG) BY MOUTH EVERY DAY    senna-docusate 8.6-50 mg (PERICOLACE) 8.6-50 mg per tablet Take 2 tablets by mouth once daily.    spironolactone (ALDACTONE) 50 MG tablet TAKE 1 TABLET BY MOUTH ONCE DAILY    tacrolimus (PROGRAF) 0.5 MG Cap TAKE 2 CAPSULES BY MOUTH EVERY MORNING AND 1 CAPSULE EVERY EVENING    valACYclovir (VALTREX) 1000 MG tablet TAKE 2 TABLETS(2000 MG) BY MOUTH TWICE DAILY FOR 2 DOSES    venlafaxine (EFFEXOR-XR) 150 MG Cp24 TAKE 1 CAPSULE(150 MG) BY MOUTH EVERY DAY     Family History       Problem Relation (Age of Onset)    Heart disease Mother    No Known Problems Father    Stroke Mother          Tobacco Use    Smoking status: Never Smoker    Smokeless tobacco: Never Used    Substance and Sexual Activity    Alcohol use: No    Drug use: No    Sexual activity: Not on file     Review of Systems   Constitutional:  Negative for chills and fever.   HENT:  Negative for congestion and rhinorrhea.    Eyes:  Negative for photophobia and visual disturbance.   Respiratory:  Negative for chest tightness and shortness of breath.    Cardiovascular:  Negative for chest pain and leg swelling.   Gastrointestinal:  Positive for abdominal distention, abdominal pain and nausea. Negative for blood in stool.   Genitourinary:  Negative for dysuria and frequency.   Musculoskeletal:  Negative for back pain and gait problem.   Skin:  Negative for color change and rash.   Neurological:  Negative for dizziness and weakness.   Psychiatric/Behavioral:  Negative for agitation and confusion.    Objective:     Vital Signs (Most Recent):  Temp: 98.5 °F (36.9 °C) (03/24/22 1727)  Pulse: 68 (03/25/22 0327)  Resp: (!) 22 (03/25/22 0327)  BP: (!) 140/74 (03/25/22 0327)  SpO2: 100 % (03/25/22 0327)   Vital Signs (24h Range):  Temp:  [98.5 °F (36.9 °C)] 98.5 °F (36.9 °C)  Pulse:  [68-80] 68  Resp:  [16-25] 22  SpO2:  [98 %-100 %] 100 %  BP: (137-182)/(63-81) 140/74     Weight: 49 kg (108 lb)  Body mass index is 21.81 kg/m².    Physical Exam  Vitals and nursing note reviewed.   Constitutional:       Appearance: She is well-developed.      Comments: Tearful. Appears uncomfortable   HENT:      Head: Normocephalic and atraumatic.      Mouth/Throat:      Mouth: Mucous membranes are moist.   Eyes:      General: No scleral icterus.     Conjunctiva/sclera: Conjunctivae normal.   Cardiovascular:      Rate and Rhythm: Normal rate and regular rhythm.      Heart sounds: Normal heart sounds.   Pulmonary:      Effort: Pulmonary effort is normal. No respiratory distress.      Breath sounds: Normal breath sounds. No wheezing.   Abdominal:      General: A surgical scar is present. Bowel sounds are normal. There is distension.       Palpations: Abdomen is soft. There is hepatomegaly.      Tenderness: There is abdominal tenderness in the right upper quadrant and epigastric area.   Musculoskeletal:         General: No tenderness. Normal range of motion.      Cervical back: Normal range of motion and neck supple.   Skin:     General: Skin is warm and dry.      Capillary Refill: Capillary refill takes less than 2 seconds.      Findings: No rash.   Neurological:      Mental Status: She is alert and oriented to person, place, and time.      Cranial Nerves: No cranial nerve deficit.      Sensory: No sensory deficit.      Coordination: Coordination normal.   Psychiatric:         Behavior: Behavior normal.         Thought Content: Thought content normal.         Judgment: Judgment normal.           Significant Labs: All pertinent labs within the past 24 hours have been reviewed.  CBC:   Recent Labs   Lab 03/24/22  1816 03/24/22  1841   WBC 4.24  --    HGB 12.6  --    HCT 38.0 34*   PLT 90*  --      CMP:   Recent Labs   Lab 03/24/22  1816      K 3.8      CO2 27   GLU 84   BUN 11   CREATININE 0.7   CALCIUM 9.0   PROT 6.8   ALBUMIN 2.8*   BILITOT 1.5*   ALKPHOS 701*   AST 99*   ALT 71*   ANIONGAP 9   EGFRNONAA >60.0     Urine Studies:   Recent Labs   Lab 03/24/22  2156   COLORU Yellow   APPEARANCEUA Clear   PHUR 7.0   SPECGRAV 1.020   PROTEINUA Negative   GLUCUA Negative   KETONESU Negative   BILIRUBINUA Negative   OCCULTUA Negative   NITRITE Negative   LEUKOCYTESUR Negative       Significant Imaging: I have reviewed all pertinent imaging results/findings within the past 24 hours.  CT Abdomen Pelvis With Contrast  Narrative: EXAMINATION:  CT ABDOMEN PELVIS WITH CONTRAST    CLINICAL HISTORY:  Abdominal abscess/infection suspected;    TECHNIQUE:  Axial images of the abdomen and pelvis were acquired  after the use of 75 cc [id number] IV contrast.  Coronal and sagittal reconstructions were also obtained    COMPARISON:  CT abdomen pelvis 10/28/2020  10/02/2020.  CT abdomen pelvis 09/03/2020.    FINDINGS:  Heart: Normal in size. No pericardial effusion.    Lungs: Visualized portions of the lungs are clear.    Liver: Postoperative changes of transplant normal in size and contour.  Nodular contour suggestive of cirrhosis.  Portal vein and SMV are patent.    Gallbladder: Surgically absent    Bile Ducts: No evidence of dilated ducts.    Pancreas: No mass or peripancreatic fat stranding.    Spleen: Enlarged measuring 14.0 cm.  Stable splenic fahad calcifications, likely represent calcified aneurysm.  Perisplenic and perigastric collaterals.    Stomach and duodenum: Small-sized hiatal hernia.    Adrenals: Unremarkable.    Kidneys/ Ureters: Normal in size and location. Stable calcifications within renal fahad bilaterally, largest 1.1 cm, likely representing renal artery aneurysms.  Partially calcified right renal hypodensity, similar to prior.  No hydronephrosis or nephrolithiasis. No ureteral dilatation.    Bladder: No evidence of wall thickening.    Reproductive organs: Uterus is surgically absent.  Left adnexal calcifications, similar to prior.    Bowel/Mesentery: Small bowel is normal in caliber with no evidence of obstruction.  Cecal suture line, could be related to prior resection.  Stable wall thickening within the left colon, likely related to volume overload/portal colopathy.  Diffuse mesenteric edema.    Peritoneum: Small amount of abdominopelvic ascites.    Lymph nodes: No retroperitoneal lymphadenopathy.    Vasculature: No aneurysm. Mild calcific atherosclerosis.  Retroperitoneal collateral vessels.    Abdominal wall:  Diffuse body wall edema.    Bones: No acute fracture. No suspicious osseous lesions.  Impression: 1. Postoperative changes of liver transplant associated with nodular contour of the allograft suggestive of cirrhosis.  Correlation with LFTs recommended.  2. Splenomegaly and scattered vascular collaterals.  3. Small volume of abdominopelvic  ascites.  4. 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  5. Additional stable findings as above.    Electronically signed by resident: Oracio Harris  Date:    03/24/2022  Time:    22:14    Electronically signed by: Clayton Messina MD  Date:    03/24/2022  Time:    23:48  X-Ray Chest PA And Lateral  Narrative: EXAMINATION:  XR CHEST PA AND LATERAL    CLINICAL HISTORY:  Unspecified abdominal pain    TECHNIQUE:  PA and lateral views of the chest were performed.    COMPARISON:  10/15/2020.    FINDINGS:  Sternotomy wires, similar to prior.    There is no consolidation, effusion, or pneumothorax.    Cardiomediastinal silhouette is unremarkable.    Regional osseous structures are unchanged.  Impression: No acute cardiopulmonary process.    Electronically signed by: Clayton eMssina MD  Date:    03/24/2022  Time:    20:19      Assessment/Plan:     * Cirrhosis of liver with ascites  RUQ abdominal pain  Nausea    Patient reports 4 days of severe, worsening abdominal pain and distention with associated nausea.     - afebrile without leukocytosis  - LFTs elevated, but near baseline  - CT abdomen/pelvis with body wall edema, splenomegaly, small volume ascites  - pain in significant pain on exam with diffuse tenderness and abdominal distention  - liver US w/ doppler ordered   - consider liver transplant consult in am  - IR consulted for therapeutic paracentesis   - trial of 40 mg IV lasix x 1 dose  - pain control, bowel regimen, prn anti-emetics  - daily CMP    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  Long-term use of immunosuppressant medication    - continue prograf BID  - daily prograf levels    Chronic constipation  - will start daily bowel regimen while receiving pain meds  - last BM 2 days ago    Hypothyroidism  - continue synthroid    Anxiety  - continue effexor    VTE Risk Mitigation (From admission, onward)         Ordered     IP VTE LOW RISK PATIENT  Once         03/25/22  0122     IP VTE LOW RISK PATIENT  Once         03/25/22 0122     Place sequential compression device  Until discontinued         03/25/22 0122                   Ora Hartman PA-C  Department of Hospital Medicine   [facility] - Emergency Dept

## 2022-03-25 NOTE — ED NOTES
Pt brought in by EMS from [facility] with "hyperactivity & elevated HR.. pt pacing back & forth & diaphoretic." Unknown substance/drug taken - states, "I'm just anxious because I haven't taken my usual medications." EMS states per [facility] he usually take "xanax." Pt with pinpoint pupils. States pain all over body & palp. Pt fidgeting in triage. Blue pill found on pt - possibly xanax. To XRay

## 2022-03-25 NOTE — PLAN OF CARE
Plan of care reviewed with patient and family.  Patient is awake, alert and oriented x 4.  Patient given prn pain meds and Zofran.  Patient relieved of nausea and pain decreased to 2/10.  Patient appears to be resting comfortably between care.  Patient had no other complaints.

## 2022-03-25 NOTE — ASSESSMENT & PLAN NOTE
RUQ abdominal pain  Nausea    Patient reports 4 days of severe, worsening abdominal pain and distention with associated nausea.     - afebrile without leukocytosis  - LFTs elevated, but near baseline  - CT abdomen/pelvis with body wall edema, splenomegaly, small volume ascites  - pain in significant pain on exam with diffuse tenderness and abdominal distention  - liver US w/ doppler ordered   - consider liver transplant consult in am  - IR consulted for therapeutic paracentesis   - trial of 40 mg IV lasix x 1 dose  - pain control, bowel regimen, prn anti-emetics  - daily CMP

## 2022-03-25 NOTE — PHARMACY MED REC
"  Admission Medication History     The home medication history was taken by Kellen Quiroga.    You may go to "Admission" then "Reconcile Home Medications" tabs to review and/or act upon these items.      The home medication list has been updated by the Pharmacy department.    Please read ALL comments highlighted in yellow.    Please address this information as you see fit.     Feel free to contact us if you have any questions or require assistance.      The medications listed below were removed from the home medication list. Please reorder if appropriate:  Patient reports no longer taking the following medication(s):   SENNA-DOCUSATE 8.6-50MG   SPIRONOLACTONE 50MG      Medications listed below were obtained from: Patient/family      Medication Sig    acetaminophen (TYLENOL) 325 MG tablet   Take 650 mg by mouth 2 (two) times daily as needed (headache).    bisacodyL 5 mg Tab   Take 15 mg by mouth daily as needed (constipation).    butalbital-aspirin-caffeine -40 mg (FIORINAL) -40 mg Cap   TAKE 2 CAPSULES BY MOUTH EVERY 6 HOURS AS NEEDED FOR HEADACHE    clonazePAM (KLONOPIN) 0.5 MG tablet Take 0.5 mg by mouth nightly as needed for Anxiety.)    levothyroxine (SYNTHROID) 75 MCG tablet   TAKE 1 TABLET(75 MCG) BY MOUTH EVERY DAY    magnesium oxide (MAG-OX) 400 mg (241.3 mg magnesium) tablet TAKE 1 TABLET(400 MG) BY MOUTH TWICE DAILY    multivitamin capsule   Take 1 capsule by mouth once daily.    ondansetron (ZOFRAN-ODT) 4 MG TbDL   DISSOLVE 1 TABLET(4 MG) ON THE TONGUE EVERY 6 HOURS AS NEEDED    pantoprazole (PROTONIX) 40 MG tablet Take 1 tablet (40 mg total) by mouth once daily.    promethazine (PHENERGAN) 25 MG tablet   Take 1 tablet (25 mg total) by mouth every 4 (four) hours as needed (if unrelieved by zofran).    ramipriL (ALTACE) 2.5 MG capsule TAKE 1 CAPSULE(2.5 MG) BY MOUTH EVERY DAY    tacrolimus (PROGRAF) 0.5 MG Cap   TAKE 2 CAPSULES BY MOUTH EVERY MORNING AND 1 CAPSULE EVERY " EVENING    venlafaxine (EFFEXOR-XR) 150 MG Cp24   TAKE 1 CAPSULE(150 MG) BY MOUTH EVERY DAY    calcium carbonate (TUMS) 200 mg calcium (500 mg) chewable tablet Take 1,500 mg by mouth daily as needed for Heartburn.)    gentian violet 2 % topical solution   Use as directed 0.5 mLs in the mouth or throat 4 (four) times daily. (Patient not taking: No sig reported)    hydrocortisone 2.5 % cream   Apply topically to affected area twice daily as needed    valACYclovir (VALTREX) 1000 MG tablet TAKE 2 TABLETS(2000 MG) BY MOUTH TWICE DAILY FOR 2 DOSES             Kellen Quiroga  EXT [id number]                .

## 2022-03-25 NOTE — HPI
Elda Hernandez is a 51 y.o. 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 Patient reports abdominal pain and distention started approximately 4 days ago and has progressively worsened. She initially thought it was due to constipation so she took Doculax and had a small bowel movement with no relief in the pain. The pain is mostly over the RUQ and she feels like her liver is more prominent than usual. She asked her transplant coordinator for lactulose and they told her to present to the ED for further eval. She endorses nausea. She denies fevers, chills, chest pain, SOB, dysuria, melena, and hematochezia. No confusion.       ED: VSSAF. Alk phos 701, Tbili 1.5, AST 99, ALT 71, Lipase 64. UA without infection. CT abdomen/pelvis with small amount of ascites, splenomegaly, diffuse body wall edema, no evidence of obstruction.

## 2022-03-25 NOTE — ED NOTES
Pt remains in pain. Provider notified. Will wait for CT before administering more pain medications

## 2022-03-25 NOTE — CARE UPDATE
LFTs stable from admission. Unable to do paracentesis, insufficient volume. Liver duplex US: Cirrhosis of the liver allograft.  Ascites.2 Elevated hepatic arterial resistive indices, a nonspecific finding which can be seen with chronic liver disease, edema, or rejection.  Will check hepatitis panel. No BM, will order lactulose.     On exam she appears uncomfortable, abdomen distended, prominent liver on exam. Tenderness to RUQ. She is passing gas without issue.     Hepatology consult placed, will see tomorrow.       Lyla Frausto PA-C

## 2022-03-25 NOTE — SUBJECTIVE & OBJECTIVE
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    Arnold-Chiari malformation     Depression     Esophageal stricture     Essential tremor     Hypertension     Left bundle branch block     Liver fibrosis, transplanted liver 10/2/2018    Suggested on fibroscan 10/2/18    Migraine without aura     MVP (mitral valve prolapse)     Non-rheumatic mitral regurgitation 10/1/2018    Non-rheumatic tricuspid valve insufficiency 10/1/2018    Osteoporosis     Recurrent urinary tract infection     Seizures     Shingles 2007    SIADH (syndrome of inappropriate ADH production)     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    Tricuspid valve prolapse     Urolithiasis     Von Gierke disease     s/p liver transplant       Past Surgical History:   Procedure Laterality Date    APPENDECTOMY  6/22/2007    APPLICATION OF WOUND VACUUM-ASSISTED CLOSURE DEVICE N/A 9/18/2020    Procedure: APPLICATION, WOUND VAC;  Surgeon: Zain Decker MD;  Location: [address];  Service: General;  Laterality: N/A;    COLONOSCOPY  5/13/2008    internal hemorrhoids    CRANIOTOMY      ESOPHAGOGASTRODUODENOSCOPY N/A 9/3/2020    Procedure: EGD (ESOPHAGOGASTRODUODENOSCOPY);  Surgeon: Tyrel Vergara MD;  Location: [facility];  Service: Endoscopy;  Laterality: N/A;    EXPLORATORY LAPAROTOMY  2020    due to perforated stomach    LAMINECTOMY  3/2001    LIVER TRANSPLANT  9/23/2002    OSSICULAR RECONSTRUCTION  10/4/1995    RIGHT REPLACEMENT PROSTHESIS for cholesteatoma    THYMECTOMY  5/2/2007    TONSILLECTOMY, ADENOIDECTOMY  1/21/2004    TOTAL ABDOMINAL HYSTERECTOMY  3/31/1994       Review of patient's allergies indicates:   Allergen Reactions    Codeine Itching     Other reaction(s): Itching    Lipitor [atorvastatin] Other (See Comments)     Other reaction(s): Muscle pain  Muscle cranmps    Morphine Itching     Other reaction(s): nausea and vomiting     Zoloft [sertraline] Other (See Comments)     Tremors/muscle spasms       No  current facility-administered medications on file prior to encounter.     Current Outpatient Medications on File Prior to Encounter   Medication Sig    acetaminophen (TYLENOL) 325 MG tablet Take 2 tablets (650 mg total) by mouth every 6 (six) hours as needed.    butalbital-aspirin-caffeine -40 mg (FIORINAL) -40 mg Cap TAKE 2 CAPSULES BY MOUTH EVERY 6 HOURS AS NEEDED FOR HEADACHE    calcium carbonate (TUMS) 200 mg calcium (500 mg) chewable tablet Take 1 tablet (500 mg total) by mouth 2 (two) times daily as needed.    clonazePAM (KLONOPIN) 0.5 MG tablet Take 1 tablet (0.5 mg total) by mouth every evening.    flu vacc [id number] 6mos up,PF, (FLUARIX QUAD 3084-3525, PF,) 60 mcg (15 mcg x 4)/0.5 mL Syrg Inject 0.5 mLs into the muscle.    gentian violet 2 % topical solution Use as directed 0.5 mLs in the mouth or throat 4 (four) times daily. (Patient not taking: No sig reported)    levothyroxine (SYNTHROID) 75 MCG tablet TAKE 1 TABLET(75 MCG) BY MOUTH EVERY DAY    magnesium oxide (MAG-OX) 400 mg (241.3 mg magnesium) tablet TAKE 1 TABLET(400 MG) BY MOUTH TWICE DAILY    multivitamin capsule Take 1 capsule by mouth once daily.    ondansetron (ZOFRAN-ODT) 4 MG TbDL DISSOLVE 1 TABLET(4 MG) ON THE TONGUE EVERY 6 HOURS AS NEEDED    pantoprazole (PROTONIX) 40 MG tablet Take 1 tablet (40 mg total) by mouth once daily.    promethazine (PHENERGAN) 25 MG tablet Take 1 tablet (25 mg total) by mouth every 4 (four) hours as needed (if unrelieved by zofran).    ramipriL (ALTACE) 2.5 MG capsule TAKE 1 CAPSULE(2.5 MG) BY MOUTH EVERY DAY    senna-docusate 8.6-50 mg (PERICOLACE) 8.6-50 mg per tablet Take 2 tablets by mouth once daily.    spironolactone (ALDACTONE) 50 MG tablet TAKE 1 TABLET BY MOUTH ONCE DAILY    tacrolimus (PROGRAF) 0.5 MG Cap TAKE 2 CAPSULES BY MOUTH EVERY MORNING AND 1 CAPSULE EVERY EVENING    valACYclovir (VALTREX) 1000 MG tablet TAKE 2 TABLETS(2000 MG) BY MOUTH TWICE DAILY FOR 2 DOSES    venlafaxine  (EFFEXOR-XR) 150 MG Cp24 TAKE 1 CAPSULE(150 MG) BY MOUTH EVERY DAY     Family History       Problem Relation (Age of Onset)    Heart disease Mother    No Known Problems Father    Stroke Mother          Tobacco Use    Smoking status: Never Smoker    Smokeless tobacco: Never Used   Substance and Sexual Activity    Alcohol use: No    Drug use: No    Sexual activity: Not on file     Review of Systems   Constitutional:  Negative for chills and fever.   HENT:  Negative for congestion and rhinorrhea.    Eyes:  Negative for photophobia and visual disturbance.   Respiratory:  Negative for chest tightness and shortness of breath.    Cardiovascular:  Negative for chest pain and leg swelling.   Gastrointestinal:  Positive for abdominal distention, abdominal pain and nausea. Negative for blood in stool.   Genitourinary:  Negative for dysuria and frequency.   Musculoskeletal:  Negative for back pain and gait problem.   Skin:  Negative for color change and rash.   Neurological:  Negative for dizziness and weakness.   Psychiatric/Behavioral:  Negative for agitation and confusion.    Objective:     Vital Signs (Most Recent):  Temp: 98.5 °F (36.9 °C) (03/24/22 1727)  Pulse: 68 (03/25/22 0327)  Resp: (!) 22 (03/25/22 0327)  BP: (!) 140/74 (03/25/22 0327)  SpO2: 100 % (03/25/22 0327)   Vital Signs (24h Range):  Temp:  [98.5 °F (36.9 °C)] 98.5 °F (36.9 °C)  Pulse:  [68-80] 68  Resp:  [16-25] 22  SpO2:  [98 %-100 %] 100 %  BP: (137-182)/(63-81) 140/74     Weight: 49 kg (108 lb)  Body mass index is 21.81 kg/m².    Physical Exam  Vitals and nursing note reviewed.   Constitutional:       Appearance: She is well-developed.      Comments: Tearful. Appears uncomfortable   HENT:      Head: Normocephalic and atraumatic.      Mouth/Throat:      Mouth: Mucous membranes are moist.   Eyes:      General: No scleral icterus.     Conjunctiva/sclera: Conjunctivae normal.   Cardiovascular:      Rate and Rhythm: Normal rate and regular rhythm.      Heart  sounds: Normal heart sounds.   Pulmonary:      Effort: Pulmonary effort is normal. No respiratory distress.      Breath sounds: Normal breath sounds. No wheezing.   Abdominal:      General: A surgical scar is present. Bowel sounds are normal. There is distension.      Palpations: Abdomen is soft. There is hepatomegaly.      Tenderness: There is abdominal tenderness in the right upper quadrant and epigastric area.   Musculoskeletal:         General: No tenderness. Normal range of motion.      Cervical back: Normal range of motion and neck supple.   Skin:     General: Skin is warm and dry.      Capillary Refill: Capillary refill takes less than 2 seconds.      Findings: No rash.   Neurological:      Mental Status: She is alert and oriented to person, place, and time.      Cranial Nerves: No cranial nerve deficit.      Sensory: No sensory deficit.      Coordination: Coordination normal.   Psychiatric:         Behavior: Behavior normal.         Thought Content: Thought content normal.         Judgment: Judgment normal.           Significant Labs: All pertinent labs within the past 24 hours have been reviewed.  CBC:   Recent Labs   Lab 03/24/22  1816 03/24/22  1841   WBC 4.24  --    HGB 12.6  --    HCT 38.0 34*   PLT 90*  --      CMP:   Recent Labs   Lab 03/24/22  1816      K 3.8      CO2 27   GLU 84   BUN 11   CREATININE 0.7   CALCIUM 9.0   PROT 6.8   ALBUMIN 2.8*   BILITOT 1.5*   ALKPHOS 701*   AST 99*   ALT 71*   ANIONGAP 9   EGFRNONAA >60.0     Urine Studies:   Recent Labs   Lab 03/24/22  2156   COLORU Yellow   APPEARANCEUA Clear   PHUR 7.0   SPECGRAV 1.020   PROTEINUA Negative   GLUCUA Negative   KETONESU Negative   BILIRUBINUA Negative   OCCULTUA Negative   NITRITE Negative   LEUKOCYTESUR Negative       Significant Imaging: I have reviewed all pertinent imaging results/findings within the past 24 hours.  CT Abdomen Pelvis With Contrast  Narrative: EXAMINATION:  CT ABDOMEN PELVIS WITH  CONTRAST    CLINICAL HISTORY:  Abdominal abscess/infection suspected;    TECHNIQUE:  Axial images of the abdomen and pelvis were acquired  after the use of 75 cc [id number] IV contrast.  Coronal and sagittal reconstructions were also obtained    COMPARISON:  CT abdomen pelvis 10/28/2020 10/02/2020.  CT abdomen pelvis 09/03/2020.    FINDINGS:  Heart: Normal in size. No pericardial effusion.    Lungs: Visualized portions of the lungs are clear.    Liver: Postoperative changes of transplant normal in size and contour.  Nodular contour suggestive of cirrhosis.  Portal vein and SMV are patent.    Gallbladder: Surgically absent    Bile Ducts: No evidence of dilated ducts.    Pancreas: No mass or peripancreatic fat stranding.    Spleen: Enlarged measuring 14.0 cm.  Stable splenic fahad calcifications, likely represent calcified aneurysm.  Perisplenic and perigastric collaterals.    Stomach and duodenum: Small-sized hiatal hernia.    Adrenals: Unremarkable.    Kidneys/ Ureters: Normal in size and location. Stable calcifications within renal fahad bilaterally, largest 1.1 cm, likely representing renal artery aneurysms.  Partially calcified right renal hypodensity, similar to prior.  No hydronephrosis or nephrolithiasis. No ureteral dilatation.    Bladder: No evidence of wall thickening.    Reproductive organs: Uterus is surgically absent.  Left adnexal calcifications, similar to prior.    Bowel/Mesentery: Small bowel is normal in caliber with no evidence of obstruction.  Cecal suture line, could be related to prior resection.  Stable wall thickening within the left colon, likely related to volume overload/portal colopathy.  Diffuse mesenteric edema.    Peritoneum: Small amount of abdominopelvic ascites.    Lymph nodes: No retroperitoneal lymphadenopathy.    Vasculature: No aneurysm. Mild calcific atherosclerosis.  Retroperitoneal collateral vessels.    Abdominal wall:  Diffuse body wall edema.    Bones: No acute fracture. No  suspicious osseous lesions.  Impression: 1. Postoperative changes of liver transplant associated with nodular contour of the allograft suggestive of cirrhosis.  Correlation with LFTs recommended.  2. Splenomegaly and scattered vascular collaterals.  3. Small volume of abdominopelvic ascites.  4. 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  5. Additional stable findings as above.    Electronically signed by resident: Oracio Harris  Date:    03/24/2022  Time:    22:14    Electronically signed by: Clayton Messina MD  Date:    03/24/2022  Time:    23:48  X-Ray Chest PA And Lateral  Narrative: EXAMINATION:  XR CHEST PA AND LATERAL    CLINICAL HISTORY:  Unspecified abdominal pain    TECHNIQUE:  PA and lateral views of the chest were performed.    COMPARISON:  10/15/2020.    FINDINGS:  Sternotomy wires, similar to prior.    There is no consolidation, effusion, or pneumothorax.    Cardiomediastinal silhouette is unremarkable.    Regional osseous structures are unchanged.  Impression: No acute cardiopulmonary process.    Electronically signed by: Clayton Messina MD  Date:    03/24/2022  Time:    20:19

## 2022-03-25 NOTE — ED NOTES
Report received from PEDRO LUIS Gordon. Pt is resting comfortably. NAD noted, VSS. BP cuff and pulse ox alarms are set. Bed low and locked, side rails up x2. Call light within reach of pt. Will continue to monitor.

## 2022-03-25 NOTE — PLAN OF CARE
Patient AAOx3, no distress noted, respirations even and unlabored, will continue to monitor. Vss. Acceptance of education, consents signed, H/P done. Labs reviewed. Patient in MPU Purdin 4 for paracentesis

## 2022-03-25 NOTE — PLAN OF CARE
Provider assessed patient and found no fluid for paracentesis and/or labs. Patient AAOx3, no distress noted, respirations even and unlabored. Allergies reviewed. Patient to be transferred to ED via patient transporter. Patient stable for transfer. Report called to PEDRO LUIS Sauceda

## 2022-03-26 LAB
ALBUMIN SERPL BCP-MCNC: 2.5 G/DL (ref 3.5–5.2)
ALP SERPL-CCNC: 573 U/L (ref 55–135)
ALT SERPL W/O P-5'-P-CCNC: 51 U/L (ref 10–44)
ANION GAP SERPL CALC-SCNC: 9 MMOL/L (ref 8–16)
AST SERPL-CCNC: 64 U/L (ref 10–40)
BASOPHILS # BLD AUTO: 0.06 K/UL (ref 0–0.2)
BASOPHILS NFR BLD: 1.5 % (ref 0–1.9)
BILIRUB SERPL-MCNC: 1.6 MG/DL (ref 0.1–1)
BUN SERPL-MCNC: 18 MG/DL (ref 6–20)
CALCIUM SERPL-MCNC: 8.6 MG/DL (ref 8.7–10.5)
CHLORIDE SERPL-SCNC: 101 MMOL/L (ref 95–110)
CO2 SERPL-SCNC: 26 MMOL/L (ref 23–29)
CREAT SERPL-MCNC: 0.8 MG/DL (ref 0.5–1.4)
DIFFERENTIAL METHOD: ABNORMAL
EOSINOPHIL # BLD AUTO: 0.4 K/UL (ref 0–0.5)
EOSINOPHIL NFR BLD: 9.1 % (ref 0–8)
ERYTHROCYTE [DISTWIDTH] IN BLOOD BY AUTOMATED COUNT: 14.1 % (ref 11.5–14.5)
EST. GFR  (AFRICAN AMERICAN): >60 ML/MIN/1.73 M^2
EST. GFR  (NON AFRICAN AMERICAN): >60 ML/MIN/1.73 M^2
GLUCOSE SERPL-MCNC: 70 MG/DL (ref 70–110)
HCT VFR BLD AUTO: 34.5 % (ref 37–48.5)
HGB BLD-MCNC: 11.3 G/DL (ref 12–16)
IMM GRANULOCYTES # BLD AUTO: 0 K/UL (ref 0–0.04)
IMM GRANULOCYTES NFR BLD AUTO: 0 % (ref 0–0.5)
LYMPHOCYTES # BLD AUTO: 0.9 K/UL (ref 1–4.8)
LYMPHOCYTES NFR BLD: 22.7 % (ref 18–48)
MAGNESIUM SERPL-MCNC: 2 MG/DL (ref 1.6–2.6)
MCH RBC QN AUTO: 30.3 PG (ref 27–31)
MCHC RBC AUTO-ENTMCNC: 32.8 G/DL (ref 32–36)
MCV RBC AUTO: 93 FL (ref 82–98)
MONOCYTES # BLD AUTO: 0.6 K/UL (ref 0.3–1)
MONOCYTES NFR BLD: 14.9 % (ref 4–15)
NEUTROPHILS # BLD AUTO: 2.1 K/UL (ref 1.8–7.7)
NEUTROPHILS NFR BLD: 51.8 % (ref 38–73)
NRBC BLD-RTO: 0 /100 WBC
PHOSPHATE SERPL-MCNC: 3.9 MG/DL (ref 2.7–4.5)
PLATELET # BLD AUTO: 87 K/UL (ref 150–450)
PMV BLD AUTO: 11.2 FL (ref 9.2–12.9)
POTASSIUM SERPL-SCNC: 3.4 MMOL/L (ref 3.5–5.1)
PROT SERPL-MCNC: 5.4 G/DL (ref 6–8.4)
RBC # BLD AUTO: 3.73 M/UL (ref 4–5.4)
SODIUM SERPL-SCNC: 136 MMOL/L (ref 136–145)
TACROLIMUS BLD-MCNC: 15.2 NG/ML (ref 5–15)
WBC # BLD AUTO: 3.97 K/UL (ref 3.9–12.7)

## 2022-03-26 PROCEDURE — 99226 PR SUBSEQUENT OBSERVATION CARE,LEVEL III: ICD-10-PCS | Mod: ,,, | Performed by: STUDENT IN AN ORGANIZED HEALTH CARE EDUCATION/TRAINING PROGRAM

## 2022-03-26 PROCEDURE — 63600175 PHARM REV CODE 636 W HCPCS: Performed by: PHYSICIAN ASSISTANT

## 2022-03-26 PROCEDURE — 99214 PR OFFICE/OUTPT VISIT, EST, LEVL IV, 30-39 MIN: ICD-10-PCS | Mod: GC,,, | Performed by: INTERNAL MEDICINE

## 2022-03-26 PROCEDURE — 36415 COLL VENOUS BLD VENIPUNCTURE: CPT | Performed by: STUDENT IN AN ORGANIZED HEALTH CARE EDUCATION/TRAINING PROGRAM

## 2022-03-26 PROCEDURE — 25000003 PHARM REV CODE 250: Performed by: STUDENT IN AN ORGANIZED HEALTH CARE EDUCATION/TRAINING PROGRAM

## 2022-03-26 PROCEDURE — 99214 OFFICE O/P EST MOD 30 MIN: CPT | Mod: GC,,, | Performed by: INTERNAL MEDICINE

## 2022-03-26 PROCEDURE — 84100 ASSAY OF PHOSPHORUS: CPT | Performed by: PHYSICIAN ASSISTANT

## 2022-03-26 PROCEDURE — 99226 PR SUBSEQUENT OBSERVATION CARE,LEVEL III: CPT | Mod: ,,, | Performed by: STUDENT IN AN ORGANIZED HEALTH CARE EDUCATION/TRAINING PROGRAM

## 2022-03-26 PROCEDURE — 80197 ASSAY OF TACROLIMUS: CPT | Performed by: STUDENT IN AN ORGANIZED HEALTH CARE EDUCATION/TRAINING PROGRAM

## 2022-03-26 PROCEDURE — 85025 COMPLETE CBC W/AUTO DIFF WBC: CPT | Performed by: PHYSICIAN ASSISTANT

## 2022-03-26 PROCEDURE — 83735 ASSAY OF MAGNESIUM: CPT | Performed by: PHYSICIAN ASSISTANT

## 2022-03-26 PROCEDURE — 96376 TX/PRO/DX INJ SAME DRUG ADON: CPT

## 2022-03-26 PROCEDURE — G0378 HOSPITAL OBSERVATION PER HR: HCPCS

## 2022-03-26 PROCEDURE — 25000242 PHARM REV CODE 250 ALT 637 W/ HCPCS: Performed by: STUDENT IN AN ORGANIZED HEALTH CARE EDUCATION/TRAINING PROGRAM

## 2022-03-26 PROCEDURE — 25000003 PHARM REV CODE 250: Performed by: PHYSICIAN ASSISTANT

## 2022-03-26 PROCEDURE — 25000003 PHARM REV CODE 250: Performed by: INTERNAL MEDICINE

## 2022-03-26 PROCEDURE — 80053 COMPREHEN METABOLIC PANEL: CPT | Performed by: PHYSICIAN ASSISTANT

## 2022-03-26 PROCEDURE — 94761 N-INVAS EAR/PLS OXIMETRY MLT: CPT

## 2022-03-26 PROCEDURE — 36415 COLL VENOUS BLD VENIPUNCTURE: CPT | Performed by: PHYSICIAN ASSISTANT

## 2022-03-26 RX ORDER — FUROSEMIDE 20 MG/1
20 TABLET ORAL DAILY
Status: DISCONTINUED | OUTPATIENT
Start: 2022-03-26 | End: 2022-03-27 | Stop reason: HOSPADM

## 2022-03-26 RX ORDER — TACROLIMUS 1 MG/1
1 CAPSULE ORAL EVERY MORNING
Status: DISCONTINUED | OUTPATIENT
Start: 2022-03-27 | End: 2022-03-27 | Stop reason: HOSPADM

## 2022-03-26 RX ORDER — TACROLIMUS 0.5 MG/1
0.5 CAPSULE ORAL EVERY EVENING
Status: DISCONTINUED | OUTPATIENT
Start: 2022-03-26 | End: 2022-03-27 | Stop reason: HOSPADM

## 2022-03-26 RX ORDER — POTASSIUM CHLORIDE 20 MEQ/1
40 TABLET, EXTENDED RELEASE ORAL ONCE
Status: COMPLETED | OUTPATIENT
Start: 2022-03-26 | End: 2022-03-26

## 2022-03-26 RX ADMIN — SENNOSIDES 8.6 MG: 8.6 TABLET ORAL at 09:03

## 2022-03-26 RX ADMIN — HYDROMORPHONE HYDROCHLORIDE 1 MG: 1 INJECTION, SOLUTION INTRAMUSCULAR; INTRAVENOUS; SUBCUTANEOUS at 10:03

## 2022-03-26 RX ADMIN — LACTULOSE 20 G: 20 SOLUTION ORAL at 08:03

## 2022-03-26 RX ADMIN — POTASSIUM CHLORIDE 40 MEQ: 1500 TABLET, EXTENDED RELEASE ORAL at 08:03

## 2022-03-26 RX ADMIN — Medication 400 MG: at 09:03

## 2022-03-26 RX ADMIN — VENLAFAXINE HYDROCHLORIDE 150 MG: 150 CAPSULE, EXTENDED RELEASE ORAL at 08:03

## 2022-03-26 RX ADMIN — ONDANSETRON 8 MG: 8 TABLET, ORALLY DISINTEGRATING ORAL at 10:03

## 2022-03-26 RX ADMIN — LEVOTHYROXINE SODIUM 75 MCG: 0.03 TABLET ORAL at 08:03

## 2022-03-26 RX ADMIN — TACROLIMUS 0.5 MG: 0.5 CAPSULE ORAL at 06:03

## 2022-03-26 RX ADMIN — HYDROMORPHONE HYDROCHLORIDE 1 MG: 1 INJECTION, SOLUTION INTRAMUSCULAR; INTRAVENOUS; SUBCUTANEOUS at 11:03

## 2022-03-26 RX ADMIN — Medication 400 MG: at 08:03

## 2022-03-26 RX ADMIN — PSYLLIUM HUSK 1 PACKET: 3.4 POWDER ORAL at 01:03

## 2022-03-26 RX ADMIN — TACROLIMUS 0.5 MG: 0.5 CAPSULE ORAL at 08:03

## 2022-03-26 RX ADMIN — FUROSEMIDE 20 MG: 20 TABLET ORAL at 01:03

## 2022-03-26 RX ADMIN — POLYETHYLENE GLYCOL 3350 17 G: 17 POWDER, FOR SOLUTION ORAL at 09:03

## 2022-03-26 RX ADMIN — PROCHLORPERAZINE EDISYLATE 5 MG: 5 INJECTION INTRAMUSCULAR; INTRAVENOUS at 05:03

## 2022-03-26 RX ADMIN — PANTOPRAZOLE SODIUM 40 MG: 40 TABLET, DELAYED RELEASE ORAL at 08:03

## 2022-03-26 RX ADMIN — MELATONIN TAB 3 MG 6 MG: 3 TAB at 11:03

## 2022-03-26 RX ADMIN — LISINOPRIL 10 MG: 5 TABLET ORAL at 08:03

## 2022-03-26 RX ADMIN — ONDANSETRON 8 MG: 8 TABLET, ORALLY DISINTEGRATING ORAL at 06:03

## 2022-03-26 RX ADMIN — SIMETHICONE 80 MG: 80 TABLET, CHEWABLE ORAL at 06:03

## 2022-03-26 RX ADMIN — HYDROMORPHONE HYDROCHLORIDE 1 MG: 1 INJECTION, SOLUTION INTRAMUSCULAR; INTRAVENOUS; SUBCUTANEOUS at 06:03

## 2022-03-26 RX ADMIN — LACTULOSE 20 G: 20 SOLUTION ORAL at 09:03

## 2022-03-26 RX ADMIN — HYDROMORPHONE HYDROCHLORIDE 1 MG: 1 INJECTION, SOLUTION INTRAMUSCULAR; INTRAVENOUS; SUBCUTANEOUS at 05:03

## 2022-03-26 RX ADMIN — LACTULOSE 20 G: 20 SOLUTION ORAL at 03:03

## 2022-03-26 NOTE — PROGRESS NOTES
[facility] [address]  [facility] Medicine  Progress Note    Patient Name: Elda Hernandez  MRN: [id number]  Patient Class: OP- Observation   Admission Date: 3/24/2022  Length of Stay: 0 days  Attending Physician: Wilder Canada MD  Primary Care Provider: David Lorenzo MD        Subjective:     Principal Problem:Cirrhosis of liver with ascites        HPI:  Elda Hernandez is a 51 y.o. 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 Patient reports abdominal pain and distention started approximately 4 days ago and has progressively worsened. She initially thought it was due to constipation so she took Doculax and had a small bowel movement with no relief in the pain. The pain is mostly over the RUQ and she feels like her liver is more prominent than usual. She asked her transplant coordinator for lactulose and they told her to present to the ED for further eval. She endorses nausea. She denies fevers, chills, chest pain, SOB, dysuria, melena, and hematochezia. No confusion.       ED: VSSAF. Alk phos 701, Tbili 1.5, AST 99, ALT 71, Lipase 64. UA without infection. CT abdomen/pelvis with small amount of ascites, splenomegaly, diffuse body wall edema, no evidence of obstruction.       Overview/Hospital Course:  IR consulted for paracentesis, though only with small-volume ascites unable to be drained. Hepatology consulted, who recommended initiating lasix 20mg daily and providing bowel regimen for constipation.      Interval History: Pt seen and examined this morning on tatyana MARIE. Still with mild abdominal pain this morning, though somewhat improved. Discussed need for hepatology evaluation for further dispo. Care plan reviewed. Otherwise, doing well and with no further complaints at this  time.        Objective:     Vital Signs (Most Recent):  Temp: 98.4 °F (36.9 °C) (03/26/22 1236)  Pulse: 75 (03/26/22 1236)  Resp: 16 (03/26/22 1236)  BP: (!) 144/64 (03/26/22 1236)  SpO2: (!) 94 % (03/26/22 1236)   Vital Signs (24h Range):  Temp:  [98 °F (36.7 °C)-98.4 °F (36.9 °C)] 98.4 °F (36.9 °C)  Pulse:  [68-85] 75  Resp:  [12-20] 16  SpO2:  [94 %-100 %] 94 %  BP: (109-151)/(53-69) 144/64     Weight: 48.9 kg (107 lb 12.9 oz)  Body mass index is 21.77 kg/m².    Intake/Output Summary (Last 24 hours) at 3/26/2022 1250  Last data filed at 3/25/2022 2000  Gross per 24 hour   Intake 240 ml   Output --   Net 240 ml        Physical Exam  Gen: in NAD, appears stated age  Neuro: AAOx4, CN2-12 grossly intact BL; motor, sensory, and strength grossly intact BL  HEENT: NTNC, EOMI, PERRLA, MMM; no thyromegaly or lymphadenopathy; no JVD appreciated  CVS: RRR, no m/r/g; S1/S2 auscultated with no S3 or S4; capillary refill < 2 sec  Resp: lungs CTAB, no w/r/r; no belabored breathing or accessory muscle use appreciated   Abd: BS+ in all 4 quadrants; abdomen mildly distended, diffusely TTP, hepatomegaly appreciated   Extrem: pulses full, equal, and regular over all 4 extremities; no UE or LE edema BL      Significant Labs: All pertinent labs within the past 24 hours have been reviewed.    Significant Imaging: I have reviewed all pertinent imaging results/findings within the past 24 hours.      Assessment/Plan:      * Cirrhosis of liver with ascites  RUQ abdominal pain  Nausea    Patient reports 4 days of severe, worsening abdominal pain and distention with associated nausea.     - afebrile without leukocytosis  - LFTs elevated, but near baseline  - CT abdomen/pelvis with body wall edema, splenomegaly, small volume ascites  - pain in significant pain on exam with diffuse tenderness and abdominal distention  - IR unable to perform para  - Hepatology consulted: started lasix 20mg daily and bowel regimen  - Continue prograf  - pain  control, bowel regimen, prn anti-emetics  - daily CMP    Chronic constipation  - will start daily bowel regimen while receiving pain meds  - last BM 2 days ago    Hypothyroidism  - continue synthroid    Anxiety  - continue effexor    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  Long-term use of immunosuppressant medication    - continue prograf BID  - daily prograf levels      VTE Risk Mitigation (From admission, onward)         Ordered     IP VTE LOW RISK PATIENT  Once         03/25/22 0122     IP VTE LOW RISK PATIENT  Once         03/25/22 0122     Place sequential compression device  Until discontinued         03/25/22 0122                Discharge Planning   MARILEE:      Code Status: Full Code   Is the patient medically ready for discharge?: No    Reason for patient still in hospital (select all that apply): Patient trending condition             Wilder Canada MD  Attending Physician  Department of Hospital Medicine  Epic secure chat preferred, or Spect[employer]Link ext. [phone]  3/26/2022

## 2022-03-26 NOTE — ASSESSMENT & PLAN NOTE
RUQ abdominal pain  Nausea    Patient reports 4 days of severe, worsening abdominal pain and distention with associated nausea.     - afebrile without leukocytosis  - LFTs elevated, but near baseline  - CT abdomen/pelvis with body wall edema, splenomegaly, small volume ascites  - pain in significant pain on exam with diffuse tenderness and abdominal distention  - IR unable to perform para  - Hepatology consulted: started lasix 20mg daily and bowel regimen  - Continue prograf  - pain control, bowel regimen, prn anti-emetics  - daily CMP

## 2022-03-26 NOTE — PLAN OF CARE
Plan of care reviewed with patient.  Patient still having intermittent nausea given prn Zofran x 2 with some relief.  Patient given prn pain medications x 2 and simethicone.  Patient resting comfortably between care with no other complaints.

## 2022-03-26 NOTE — HOSPITAL COURSE
IR consulted for paracentesis, though only with small-volume ascites unable to be drained. Hepatology consulted, who recommended initiating lasix 20mg daily and providing bowel regimen for constipation. Pt had a BM, and abdominal pain more controlled into 3/27. Discussed with hepatology, and pt deemed stable for discharge with follow-up in their clinic. Plan discussed with pt, who was agreeable and amenable; medications were discussed and reviewed, outpatient follow-up scheduled, ER precautions were given, all questions were answered to the pt's satisfaction, and Mrs. Hernandez was subsequently discharged.

## 2022-03-26 NOTE — PLAN OF CARE
Problem: Adult Inpatient Plan of Care  Goal: Plan of Care Review  Outcome: Ongoing, Progressing  Goal: Patient-Specific Goal (Individualized)  Outcome: Ongoing, Progressing  Goal: Absence of Hospital-Acquired Illness or Injury  Outcome: Ongoing, Progressing  Goal: Optimal Comfort and Wellbeing  Outcome: Ongoing, Progressing  Goal: Readiness for Transition of Care  Outcome: Ongoing, Progressing     Problem: Infection  Goal: Absence of Infection Signs and Symptoms  Outcome: Ongoing, Progressing    See flow sheets/MAR/I&O and vital signs

## 2022-03-26 NOTE — CONSULTS
[facility]  Hepatology  Consult Note    Patient Name: Elda Hernandez  MRN: [id number]  Admission Date: 3/24/2022  Hospital Length of Stay: 0 days  Code Status: Full Code   Attending Provider: Wilder Canada MD   Consulting Provider: Amy Salas MD  Primary Care Physician: David Lorenzo MD  Principal Problem:Cirrhosis of liver with ascites    Inpatient consult to Hepatology  Consult performed by: Amy Salas MD  Consult ordered by: Lyla Frausto PA-C        Subjective:     HPI: Elda Hernandez is a 51 y.o. female with history of liver transplant in 2002 for glycogen storage disease, complicated by cirrhosis of her graft due to biliary strictures and chronic rejection, overall compensated, history of gastric perforation due to gastric outlet obstruction in 2020, who presents for abdominal pain and distention.  She reports that she has been having diffuse abdominal pain, however worse in the right upper quadrant for about a week.  Described as swelling of her abdomen and as if her insides are trying to come out, associated with severe nausea but no vomiting.  Denies cramps, fevers, chills, blood in the stool.  She has been having a bowel movement every 2-3 days which is her baseline, sometimes she uses Dulcolax to help her go.  Denies confusion, peripheral edema.  On admission, she was afebrile and hemodynamically stable.  Labs, unremarkable, liver tests at baseline with mildly elevated transaminases and bilirubin.  She is on tacrolimus 1 mg morning, 0.5 mg the evening.  CT abdomen pelvis with small volume ascites, otherwise stable findings.  Hepatology consulted for immunosuppression management and abdominal pain.      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    Arnold-Chiari malformation     Depression     Esophageal stricture     Essential tremor     Hypertension     Left bundle branch block     Liver fibrosis, transplanted liver  10/2/2018    Suggested on fibroscan 10/2/18    Migraine without aura     MVP (mitral valve prolapse)     Non-rheumatic mitral regurgitation 10/1/2018    Non-rheumatic tricuspid valve insufficiency 10/1/2018    Osteoporosis     Recurrent urinary tract infection     Seizures     Shingles 2007    SIADH (syndrome of inappropriate ADH production)     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    Tricuspid valve prolapse     Urolithiasis     Von Gierke disease     s/p liver transplant       Past Surgical History:   Procedure Laterality Date    APPENDECTOMY  6/22/2007    APPLICATION OF WOUND VACUUM-ASSISTED CLOSURE DEVICE N/A 9/18/2020    Procedure: APPLICATION, WOUND VAC;  Surgeon: Zain Decker MD;  Location: [facility] OR [address] [place] [address];  Service: General;  Laterality: N/A;    COLONOSCOPY  5/13/2008    internal hemorrhoids    CRANIOTOMY      ESOPHAGOGASTRODUODENOSCOPY N/A 9/3/2020    Procedure: EGD (ESOPHAGOGASTRODUODENOSCOPY);  Surgeon: Tyrel Vergara MD;  Location: [facility];  Service: Endoscopy;  Laterality: N/A;    EXPLORATORY LAPAROTOMY  2020    due to perforated stomach    LAMINECTOMY  3/2001    LIVER TRANSPLANT  9/23/2002    OSSICULAR RECONSTRUCTION  10/4/1995    RIGHT REPLACEMENT PROSTHESIS for cholesteatoma    THYMECTOMY  5/2/2007    TONSILLECTOMY, ADENOIDECTOMY  1/21/2004    TOTAL ABDOMINAL HYSTERECTOMY  3/31/1994       Family History   Problem Relation Age of Onset    Stroke Mother     Heart disease Mother     No Known Problems Father        Social History     Socioeconomic History    Marital status:    Tobacco Use    Smoking status: Never Smoker    Smokeless tobacco: Never Used   Substance and Sexual Activity    Alcohol use: No    Drug use: No       No current facility-administered medications on file prior to encounter.     Current Outpatient Medications on File Prior to Encounter   Medication Sig Dispense Refill    acetaminophen (TYLENOL) 325 MG tablet Take 2 tablets  (650 mg total) by mouth every 6 (six) hours as needed. (Patient taking differently: Take 650 mg by mouth 2 (two) times daily as needed (headache).)  0    bisacodyL 5 mg Tab Take 15 mg by mouth daily as needed (constipation).      butalbital-aspirin-caffeine -40 mg (FIORINAL) -40 mg Cap TAKE 2 CAPSULES BY MOUTH EVERY 6 HOURS AS NEEDED FOR HEADACHE 120 capsule 0    clonazePAM (KLONOPIN) 0.5 MG tablet Take 1 tablet (0.5 mg total) by mouth every evening. (Patient taking differently: Take 0.5 mg by mouth nightly as needed for Anxiety.) 30 tablet 3    levothyroxine (SYNTHROID) 75 MCG tablet TAKE 1 TABLET(75 MCG) BY MOUTH EVERY DAY 90 tablet 0    magnesium oxide (MAG-OX) 400 mg (241.3 mg magnesium) tablet TAKE 1 TABLET(400 MG) BY MOUTH TWICE DAILY 180 tablet 3    multivitamin capsule Take 1 capsule by mouth once daily.      ondansetron (ZOFRAN-ODT) 4 MG TbDL DISSOLVE 1 TABLET(4 MG) ON THE TONGUE EVERY 6 HOURS AS NEEDED 30 tablet 0    pantoprazole (PROTONIX) 40 MG tablet Take 1 tablet (40 mg total) by mouth once daily. 90 tablet 3    promethazine (PHENERGAN) 25 MG tablet Take 1 tablet (25 mg total) by mouth every 4 (four) hours as needed (if unrelieved by zofran). 30 tablet 0    ramipriL (ALTACE) 2.5 MG capsule TAKE 1 CAPSULE(2.5 MG) BY MOUTH EVERY DAY 90 capsule 0    tacrolimus (PROGRAF) 0.5 MG Cap TAKE 2 CAPSULES BY MOUTH EVERY MORNING AND 1 CAPSULE EVERY EVENING 90 capsule 11    venlafaxine (EFFEXOR-XR) 150 MG Cp24 TAKE 1 CAPSULE(150 MG) BY MOUTH EVERY DAY 90 capsule 3    calcium carbonate (TUMS) 200 mg calcium (500 mg) chewable tablet Take 1 tablet (500 mg total) by mouth 2 (two) times daily as needed. (Patient taking differently: Take 1,500 mg by mouth daily as needed for Heartburn.)      flu vacc [id number] 6mos up,PF, (FLUARIX QUAD [id number], PF,) 60 mcg (15 mcg x 4)/0.5 mL Syrg Inject 0.5 mLs into the muscle. 0.5 mL 0    gentian violet 2 % topical solution Use as directed 0.5 mLs in the  mouth or throat 4 (four) times daily. (Patient not taking: No sig reported) 59 mL 0    hydrocortisone 2.5 % cream Apply topically to affected area twice daily as needed      valACYclovir (VALTREX) 1000 MG tablet TAKE 2 TABLETS(2000 MG) BY MOUTH TWICE DAILY FOR 2 DOSES 4 tablet 3       Review of patient's allergies indicates:   Allergen Reactions    Codeine Itching     Other reaction(s): Itching    Lipitor [atorvastatin] Other (See Comments)     Other reaction(s): Muscle pain  Muscle cranmps    Morphine Itching     Other reaction(s): nausea and vomiting     Zoloft [sertraline] Other (See Comments)     Tremors/muscle spasms       Review of Systems   Constitutional: Negative for chills and fever.   HENT: Negative.    Eyes: Negative.    Respiratory: Negative.    Cardiovascular: Negative.    Gastrointestinal: Positive for abdominal pain, constipation and nausea. Negative for blood in stool, melena and vomiting.   Genitourinary: Negative.    Musculoskeletal: Negative.    Neurological: Negative.    Psychiatric/Behavioral: Negative.         Objective:     Vitals:    03/26/22 0828   BP: (!) 151/67   Pulse: 81   Resp: 16   Temp: 98.3 °F (36.8 °C)         Constitutional:  not in acute distress and well developed  HENT: Head: Normal, normocephalic, atraumatic.  Eyes: conjunctiva clear and sclera nonicteric  Cardiovascular: regular rate and rhythm  Respiratory: normal chest expansion & respiratory effort   and no accessory muscle use  GI: soft, tender to palpation diffusely  Musculoskeletal: no muscular tenderness noted  Skin: normal color  Neurological: alert, oriented x3  Psychiatric: mood and affect are within normal limits, pt is a good historian; no memory problems were noted    Significant Labs:  Recent Labs   Lab 03/24/22  1816 03/25/22  0321 03/26/22  0308   HGB 12.6 11.4* 11.3*       Lab Results   Component Value Date    WBC 3.97 03/26/2022    HGB 11.3 (L) 03/26/2022    HCT 34.5 (L) 03/26/2022    MCV 93 03/26/2022     PLT 87 (L) 03/26/2022       Lab Results   Component Value Date     03/26/2022    K 3.4 (L) 03/26/2022     03/26/2022    CO2 26 03/26/2022    BUN 18 03/26/2022    CREATININE 0.8 03/26/2022    CALCIUM 8.6 (L) 03/26/2022    ANIONGAP 9 03/26/2022    ESTGFRAFRICA >60.0 03/26/2022    EGFRNONAA >60.0 03/26/2022       Lab Results   Component Value Date    ALT 51 (H) 03/26/2022    AST 64 (H) 03/26/2022     (H) 04/07/2018    ALKPHOS 573 (H) 03/26/2022    BILITOT 1.6 (H) 03/26/2022       Lab Results   Component Value Date    INR 1.2 03/24/2022    INR 1.0 01/29/2022    INR 1.0 10/23/2021       Significant Imaging:  Reviewed pertinent radiology findings.       Assessment/Plan:     Elda Hernandez is a 51 y.o. female with history of liver transplant in 2002 for glycogen storage disease, complicated by cirrhosis of her graft due to biliary strictures and chronic rejection, overall compensated, history of gastric perforation due to gastric outlet obstruction in 2020, who presents for abdominal pain and distention.     Problem List:  1. History of liver transplant in 2002  2. Immunosuppressant use  3. Abdominal pain      Assessment:  The patient presents with abdominal pain, with no obvious cause on CT.  She has small volume ascites that was not amenable to paracentesis, would recommend starting low-dose diuretics for medical management of ascites.  Her pain could be from constipation, therefore would recommend regular bowel regimen.  As far as her Prograf, would recommend continuing at her home dose and monitoring trough levels, will adjust accordingly if needed.    Recommendations:  - start Lasix 20 mg daily  - bowel regimen, start Metamucil and MiraLax daily, avoid lactulose since it may cause worsening abdominal distension and pain  - tacrolimus 1 mg in morning, 0.5 mg in the evening  - daily LFTs, creatinine, tacro trough level    Thank you for involving us in the care of Elda Hernandez. Please call  with any additional questions, concerns or changes in the patient's clinical status. We will continue to follow.    Amy Salas MD  Gastroenterology & Hepatology Fellow PGY V   [facility]

## 2022-03-26 NOTE — SUBJECTIVE & OBJECTIVE
Interval History: Pt seen and examined this morning on tatyana R[facility]. Still with mild abdominal pain this morning, though somewhat improved. Discussed need for hepatology evaluation for further dispo. Care plan reviewed. Otherwise, doing well and with no further complaints at this time.        Objective:     Vital Signs (Most Recent):  Temp: 98.4 °F (36.9 °C) (03/26/22 1236)  Pulse: 75 (03/26/22 1236)  Resp: 16 (03/26/22 1236)  BP: (!) 144/64 (03/26/22 1236)  SpO2: (!) 94 % (03/26/22 1236)   Vital Signs (24h Range):  Temp:  [98 °F (36.7 °C)-98.4 °F (36.9 °C)] 98.4 °F (36.9 °C)  Pulse:  [68-85] 75  Resp:  [12-20] 16  SpO2:  [94 %-100 %] 94 %  BP: (109-151)/(53-69) 144/64     Weight: 48.9 kg (107 lb 12.9 oz)  Body mass index is 21.77 kg/m².    Intake/Output Summary (Last 24 hours) at 3/26/2022 1250  Last data filed at 3/25/2022 2000  Gross per 24 hour   Intake 240 ml   Output --   Net 240 ml        Physical Exam  Gen: in NAD, appears stated age  Neuro: AAOx4, CN2-12 grossly intact BL; motor, sensory, and strength grossly intact BL  HEENT: NTNC, EOMI, PERRLA, MMM; no thyromegaly or lymphadenopathy; no JVD appreciated  CVS: RRR, no m/r/g; S1/S2 auscultated with no S3 or S4; capillary refill < 2 sec  Resp: lungs CTAB, no w/r/r; no belabored breathing or accessory muscle use appreciated   Abd: BS+ in all 4 quadrants; abdomen mildly distended, diffusely TTP, hepatomegaly appreciated   Extrem: pulses full, equal, and regular over all 4 extremities; no UE or LE edema BL      Significant Labs: All pertinent labs within the past 24 hours have been reviewed.    Significant Imaging: I have reviewed all pertinent imaging results/findings within the past 24 hours.

## 2022-03-27 VITALS
TEMPERATURE: 99 F | WEIGHT: 107.81 LBS | HEIGHT: 59 IN | RESPIRATION RATE: 18 BRPM | BODY MASS INDEX: 21.73 KG/M2 | OXYGEN SATURATION: 98 % | DIASTOLIC BLOOD PRESSURE: 65 MMHG | SYSTOLIC BLOOD PRESSURE: 144 MMHG | HEART RATE: 68 BPM

## 2022-03-27 LAB
ALBUMIN SERPL BCP-MCNC: 2.6 G/DL (ref 3.5–5.2)
ALP SERPL-CCNC: 621 U/L (ref 55–135)
ALT SERPL W/O P-5'-P-CCNC: 54 U/L (ref 10–44)
ANION GAP SERPL CALC-SCNC: 9 MMOL/L (ref 8–16)
AST SERPL-CCNC: 69 U/L (ref 10–40)
BASOPHILS # BLD AUTO: 0.06 K/UL (ref 0–0.2)
BASOPHILS NFR BLD: 1.3 % (ref 0–1.9)
BILIRUB SERPL-MCNC: 1.4 MG/DL (ref 0.1–1)
BUN SERPL-MCNC: 15 MG/DL (ref 6–20)
CALCIUM SERPL-MCNC: 9.4 MG/DL (ref 8.7–10.5)
CHLORIDE SERPL-SCNC: 100 MMOL/L (ref 95–110)
CO2 SERPL-SCNC: 26 MMOL/L (ref 23–29)
CREAT SERPL-MCNC: 0.9 MG/DL (ref 0.5–1.4)
DIFFERENTIAL METHOD: ABNORMAL
EOSINOPHIL # BLD AUTO: 0.3 K/UL (ref 0–0.5)
EOSINOPHIL NFR BLD: 5.4 % (ref 0–8)
ERYTHROCYTE [DISTWIDTH] IN BLOOD BY AUTOMATED COUNT: 14 % (ref 11.5–14.5)
EST. GFR  (AFRICAN AMERICAN): >60 ML/MIN/1.73 M^2
EST. GFR  (NON AFRICAN AMERICAN): >60 ML/MIN/1.73 M^2
GLUCOSE SERPL-MCNC: 71 MG/DL (ref 70–110)
HCT VFR BLD AUTO: 35 % (ref 37–48.5)
HGB BLD-MCNC: 11.9 G/DL (ref 12–16)
IMM GRANULOCYTES # BLD AUTO: 0.02 K/UL (ref 0–0.04)
IMM GRANULOCYTES NFR BLD AUTO: 0.4 % (ref 0–0.5)
LYMPHOCYTES # BLD AUTO: 1.1 K/UL (ref 1–4.8)
LYMPHOCYTES NFR BLD: 23 % (ref 18–48)
MAGNESIUM SERPL-MCNC: 1.9 MG/DL (ref 1.6–2.6)
MCH RBC QN AUTO: 31 PG (ref 27–31)
MCHC RBC AUTO-ENTMCNC: 34 G/DL (ref 32–36)
MCV RBC AUTO: 91 FL (ref 82–98)
MONOCYTES # BLD AUTO: 0.7 K/UL (ref 0.3–1)
MONOCYTES NFR BLD: 13.6 % (ref 4–15)
NEUTROPHILS # BLD AUTO: 2.7 K/UL (ref 1.8–7.7)
NEUTROPHILS NFR BLD: 56.3 % (ref 38–73)
NRBC BLD-RTO: 0 /100 WBC
PHOSPHATE SERPL-MCNC: 2.3 MG/DL (ref 2.7–4.5)
PLATELET # BLD AUTO: 95 K/UL (ref 150–450)
PMV BLD AUTO: 11.4 FL (ref 9.2–12.9)
POTASSIUM SERPL-SCNC: 4.1 MMOL/L (ref 3.5–5.1)
PROT SERPL-MCNC: 6.1 G/DL (ref 6–8.4)
RBC # BLD AUTO: 3.84 M/UL (ref 4–5.4)
SODIUM SERPL-SCNC: 135 MMOL/L (ref 136–145)
TACROLIMUS BLD-MCNC: 7.4 NG/ML (ref 5–15)
WBC # BLD AUTO: 4.79 K/UL (ref 3.9–12.7)

## 2022-03-27 PROCEDURE — 83735 ASSAY OF MAGNESIUM: CPT | Performed by: PHYSICIAN ASSISTANT

## 2022-03-27 PROCEDURE — 80197 ASSAY OF TACROLIMUS: CPT | Performed by: STUDENT IN AN ORGANIZED HEALTH CARE EDUCATION/TRAINING PROGRAM

## 2022-03-27 PROCEDURE — 80053 COMPREHEN METABOLIC PANEL: CPT | Performed by: PHYSICIAN ASSISTANT

## 2022-03-27 PROCEDURE — 25000003 PHARM REV CODE 250: Performed by: PHYSICIAN ASSISTANT

## 2022-03-27 PROCEDURE — G0378 HOSPITAL OBSERVATION PER HR: HCPCS

## 2022-03-27 PROCEDURE — 99217 PR OBSERVATION CARE DISCHARGE: CPT | Mod: ,,, | Performed by: STUDENT IN AN ORGANIZED HEALTH CARE EDUCATION/TRAINING PROGRAM

## 2022-03-27 PROCEDURE — 84100 ASSAY OF PHOSPHORUS: CPT | Performed by: PHYSICIAN ASSISTANT

## 2022-03-27 PROCEDURE — 36415 COLL VENOUS BLD VENIPUNCTURE: CPT | Performed by: PHYSICIAN ASSISTANT

## 2022-03-27 PROCEDURE — 94761 N-INVAS EAR/PLS OXIMETRY MLT: CPT

## 2022-03-27 PROCEDURE — 96376 TX/PRO/DX INJ SAME DRUG ADON: CPT

## 2022-03-27 PROCEDURE — 36415 COLL VENOUS BLD VENIPUNCTURE: CPT | Performed by: STUDENT IN AN ORGANIZED HEALTH CARE EDUCATION/TRAINING PROGRAM

## 2022-03-27 PROCEDURE — 25000003 PHARM REV CODE 250: Performed by: STUDENT IN AN ORGANIZED HEALTH CARE EDUCATION/TRAINING PROGRAM

## 2022-03-27 PROCEDURE — 85025 COMPLETE CBC W/AUTO DIFF WBC: CPT | Performed by: PHYSICIAN ASSISTANT

## 2022-03-27 PROCEDURE — 63600175 PHARM REV CODE 636 W HCPCS: Performed by: PHYSICIAN ASSISTANT

## 2022-03-27 PROCEDURE — 25000003 PHARM REV CODE 250: Performed by: INTERNAL MEDICINE

## 2022-03-27 PROCEDURE — 99217 PR OBSERVATION CARE DISCHARGE: ICD-10-PCS | Mod: ,,, | Performed by: STUDENT IN AN ORGANIZED HEALTH CARE EDUCATION/TRAINING PROGRAM

## 2022-03-27 PROCEDURE — 25000242 PHARM REV CODE 250 ALT 637 W/ HCPCS: Performed by: STUDENT IN AN ORGANIZED HEALTH CARE EDUCATION/TRAINING PROGRAM

## 2022-03-27 RX ORDER — SODIUM,POTASSIUM PHOSPHATES 280-250MG
2 POWDER IN PACKET (EA) ORAL ONCE
Status: COMPLETED | OUTPATIENT
Start: 2022-03-27 | End: 2022-03-27

## 2022-03-27 RX ORDER — POLYETHYLENE GLYCOL 3350 17 G/17G
17 POWDER, FOR SOLUTION ORAL 2 TIMES DAILY
Qty: 1020 G | Refills: 2 | Status: SHIPPED | OUTPATIENT
Start: 2022-03-27

## 2022-03-27 RX ORDER — FUROSEMIDE 20 MG/1
20 TABLET ORAL DAILY
Qty: 30 TABLET | Refills: 2 | Status: SHIPPED | OUTPATIENT
Start: 2022-03-27 | End: 2022-04-11 | Stop reason: SDUPTHER

## 2022-03-27 RX ORDER — OXYCODONE HYDROCHLORIDE 5 MG/1
5 TABLET ORAL EVERY 6 HOURS PRN
Qty: 16 TABLET | Refills: 0 | Status: SHIPPED | OUTPATIENT
Start: 2022-03-27 | End: 2022-03-31

## 2022-03-27 RX ADMIN — LACTULOSE 20 G: 20 SOLUTION ORAL at 09:03

## 2022-03-27 RX ADMIN — FUROSEMIDE 20 MG: 20 TABLET ORAL at 09:03

## 2022-03-27 RX ADMIN — HYDROMORPHONE HYDROCHLORIDE 1 MG: 1 INJECTION, SOLUTION INTRAMUSCULAR; INTRAVENOUS; SUBCUTANEOUS at 04:03

## 2022-03-27 RX ADMIN — LISINOPRIL 10 MG: 5 TABLET ORAL at 09:03

## 2022-03-27 RX ADMIN — TACROLIMUS 1 MG: 0.5 CAPSULE ORAL at 09:03

## 2022-03-27 RX ADMIN — VENLAFAXINE HYDROCHLORIDE 150 MG: 150 CAPSULE, EXTENDED RELEASE ORAL at 09:03

## 2022-03-27 RX ADMIN — POLYETHYLENE GLYCOL 3350 17 G: 17 POWDER, FOR SOLUTION ORAL at 09:03

## 2022-03-27 RX ADMIN — PSYLLIUM HUSK 1 PACKET: 3.4 POWDER ORAL at 09:03

## 2022-03-27 RX ADMIN — ONDANSETRON 8 MG: 8 TABLET, ORALLY DISINTEGRATING ORAL at 10:03

## 2022-03-27 RX ADMIN — POTASSIUM & SODIUM PHOSPHATES POWDER PACK 280-160-250 MG 2 PACKET: 280-160-250 PACK at 09:03

## 2022-03-27 RX ADMIN — LEVOTHYROXINE SODIUM 75 MCG: 0.03 TABLET ORAL at 09:03

## 2022-03-27 RX ADMIN — PANTOPRAZOLE SODIUM 40 MG: 40 TABLET, DELAYED RELEASE ORAL at 09:03

## 2022-03-27 RX ADMIN — Medication 400 MG: at 09:03

## 2022-03-27 RX ADMIN — HYDROMORPHONE HYDROCHLORIDE 1 MG: 1 INJECTION, SOLUTION INTRAMUSCULAR; INTRAVENOUS; SUBCUTANEOUS at 10:03

## 2022-03-27 NOTE — DISCHARGE SUMMARY
[facility] - Telemetry Stepdown ([place] [address])  [facility] Medicine  Discharge Summary      Patient Name: Elda Hernandez  MRN: [id number]  Patient Class: OP- Observation  Admission Date: 3/24/2022  Hospital Length of Stay: 0 days  Discharge Date and Time:  03/27/2022 10:29 AM  Attending Physician: Wilder Canada MD   Discharging Provider: Wilder Canada MD  Primary Care Provider: David Lorenzo MD  [facility] Medicine Team: [facility] MED  Wilder Canada MD    HPI:   Elda Hernandez is a 51 y.o. 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 Patient reports abdominal pain and distention started approximately 4 days ago and has progressively worsened. She initially thought it was due to constipation so she took Doculax and had a small bowel movement with no relief in the pain. The pain is mostly over the RUQ and she feels like her liver is more prominent than usual. She asked her transplant coordinator for lactulose and they told her to present to the ED for further eval. She endorses nausea. She denies fevers, chills, chest pain, SOB, dysuria, melena, and hematochezia. No confusion.       ED: VSSAF. Alk phos 701, Tbili 1.5, AST 99, ALT 71, Lipase 64. UA without infection. CT abdomen/pelvis with small amount of ascites, splenomegaly, diffuse body wall edema, no evidence of obstruction.       * No surgery found *      Hospital Course:   IR consulted for paracentesis, though only with small-volume ascites unable to be drained. Hepatology consulted, who recommended initiating lasix 20mg daily and providing bowel regimen for constipation. Pt had a BM, and abdominal pain more controlled into 3/27. Discussed with hepatology, and pt deemed stable for discharge with follow-up in their clinic. Plan discussed with pt, who was  agreeable and amenable; medications were discussed and reviewed, outpatient follow-up scheduled, ER precautions were given, all questions were answered to the pt's satisfaction, and Mrs. Hernandez was subsequently discharged.         Goals of Care Treatment Preferences:  Code Status: Full Code      Consults:   Consults (From admission, onward)        Status Ordering Provider     Inpatient consult to Hepatology  Once        Provider:  (Not yet assigned)    JOSELIN Loo          No new Assessment & Plan notes have been filed under this hospital service since the last note was generated.  Service: Hospital Medicine    Final Active Diagnoses:    Diagnosis Date Noted POA    PRINCIPAL PROBLEM:  Cirrhosis of liver with ascites [K74.60, R18.8] 03/25/2022 Yes    Splenomegaly [R16.1] 03/25/2022 Yes    Liver fibrosis, transplanted liver [T86.49, K74.00] 10/02/2018 Yes    Chronic constipation [K59.09] 10/01/2018 Yes    Long-term use of immunosuppressant medication [Z79.899]  Not Applicable    Chronic rejection of liver transplant [T86.41] 05/18/2015 Yes    RUQ abdominal pain [R10.11] 04/26/2015 Yes    Elevated liver enzymes [R74.8] 12/29/2014 Yes    Prophylactic immunotherapy [Z29.8] 12/02/2013 Not Applicable     Chronic    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 [Z94.4] 10/12/2013 Not Applicable     Chronic    Anxiety [F41.9] 10/12/2013 Yes     Chronic    Hypothyroidism [E03.9] 10/12/2013 Yes     Chronic    SIADH (syndrome of inappropriate ADH production) [E22.2] 10/12/2013 Yes     Chronic      Problems Resolved During this Admission:       Discharged Condition: stable    Disposition: Home or Self Care    Follow Up:   Follow-up Information     David Lorenzo MD. Schedule an appointment as soon as possible for a visit.    Specialties: Internal Medicine, Family Medicine  Contact information:  [address] [place] [address] [phone]                       Patient Instructions:       Ambulatory referral/consult to Internal Medicine   Standing Status: Future   Referral Priority: Routine Referral Type: Consultation   Referral Reason: Specialty Services Required   Requested Specialty: Internal Medicine   Number of Visits Requested: 1     Ambulatory referral/consult to Hepatology   Standing Status: Future   Referral Priority: Urgent Referral Type: Consultation   Referral Reason: Specialty Services Required   Requested Specialty: Hepatology   Number of Visits Requested: 1     Diet Cardiac   Order Comments: Low sodium     Notify your health care provider if you experience any of the following:  increased confusion or weakness     Notify your health care provider if you experience any of the following:  persistent dizziness, light-headedness, or visual disturbances     Notify your health care provider if you experience any of the following:  worsening rash     Notify your health care provider if you experience any of the following:  severe persistent headache     Notify your health care provider if you experience any of the following:  difficulty breathing or increased cough     Notify your health care provider if you experience any of the following:  severe uncontrolled pain     Notify your health care provider if you experience any of the following:  persistent nausea and vomiting or diarrhea     Notify your health care provider if you experience any of the following:  temperature >100.4     Activity as tolerated       Significant Diagnostic Studies: Labs: All labs within the past 24 hours have been reviewed      Medications:  Reconciled Home Medications:      Medication List      START taking these medications    furosemide 20 MG tablet  Commonly known as: LASIX  Take 1 tablet (20 mg total) by mouth once daily.     oxyCODONE 5 MG immediate release tablet  Commonly known as: ROXICODONE  Take 1 tablet (5 mg total) by mouth every 6 (six) hours as needed for Pain.     polyethylene glycol 17 gram/dose  powder  Commonly known as: GLYCOLAX  MIX 17 GRAMS (1 capful) IN LIQUID AND DRINK BY MOUTH TWICE DAILY     psyllium husk (aspartame) 3.4 gram Pwpk packet  Commonly known as: METAMUCIL  Take 1 packet by mouth once daily.        CHANGE how you take these medications    acetaminophen 325 MG tablet  Commonly known as: TYLENOL  Take 2 tablets (650 mg total) by mouth every 6 (six) hours as needed.  What changed:   · when to take this  · reasons to take this     calcium carbonate 200 mg calcium (500 mg) chewable tablet  Commonly known as: TUMS  Take 1 tablet (500 mg total) by mouth 2 (two) times daily as needed.  What changed:   · how much to take  · when to take this  · reasons to take this     clonazePAM 0.5 MG tablet  Commonly known as: KlonoPIN  Take 1 tablet (0.5 mg total) by mouth every evening.  What changed:   · when to take this  · reasons to take this        CONTINUE taking these medications    butalbital-aspirin-caffeine -40 mg -40 mg Cap  Commonly known as: FIORINAL  TAKE 2 CAPSULES BY MOUTH EVERY 6 HOURS AS NEEDED FOR HEADACHE     FLUARIX QUAD [id number] (PF) 60 mcg (15 mcg x 4)/0.5 mL Syrg  Generic drug: flu vacc [id number] 6mos up(PF)  Inject 0.5 mLs into the muscle.     gentian violet 2 % topical solution  Use as directed 0.5 mLs in the mouth or throat 4 (four) times daily.     hydrocortisone 2.5 % cream  Apply topically to affected area twice daily as needed     levothyroxine 75 MCG tablet  Commonly known as: SYNTHROID  TAKE 1 TABLET(75 MCG) BY MOUTH EVERY DAY     magnesium oxide 400 mg (241.3 mg magnesium) tablet  Commonly known as: MAG-OX  TAKE 1 TABLET(400 MG) BY MOUTH TWICE DAILY     multivitamin capsule  Take 1 capsule by mouth once daily.     ondansetron 4 MG Tbdl  Commonly known as: ZOFRAN-ODT  DISSOLVE 1 TABLET(4 MG) ON THE TONGUE EVERY 6 HOURS AS NEEDED     pantoprazole 40 MG tablet  Commonly known as: PROTONIX  Take 1 tablet (40 mg total) by mouth once daily.     promethazine 25 MG  tablet  Commonly known as: PHENERGAN  Take 1 tablet (25 mg total) by mouth every 4 (four) hours as needed (if unrelieved by zofran).     ramipriL 2.5 MG capsule  Commonly known as: ALTACE  TAKE 1 CAPSULE(2.5 MG) BY MOUTH EVERY DAY     tacrolimus 0.5 MG Cap  Commonly known as: PROGRAF  TAKE 2 CAPSULES BY MOUTH EVERY MORNING AND 1 CAPSULE EVERY EVENING     valACYclovir 1000 MG tablet  Commonly known as: VALTREX  TAKE 2 TABLETS(2000 MG) BY MOUTH TWICE DAILY FOR 2 DOSES     venlafaxine 150 MG Cp24  Commonly known as: EFFEXOR-XR  TAKE 1 CAPSULE(150 MG) BY MOUTH EVERY DAY        STOP taking these medications    bisacodyL 5 mg Tab            Indwelling Lines/Drains at time of discharge:   Lines/Drains/Airways     None                 Time spent on the discharge of patient: 35 minutes         Wilder Canada MD  Attending Physician  Department of Hospital Medicine  Epic secure chat preferred, or Spect[employer]Link ext. [phone]  3/27/2022

## 2022-03-27 NOTE — NURSING
Patient IV removed discharge instructions given and verbally understood. Patient's family wheeled patient out in wheelchair.  No acute distress and free from falls.

## 2022-03-27 NOTE — PLAN OF CARE
Plan of care reviewed with patient.  Patient had complaint of nausea given Zofran nausea is better.  Patient given prn pain medications x 1 with some relief.  Patient family at bedside and eager for discharge.  Patient has remained free from falls.  Patient is awake, alert and oriented x 4 with no other complaints.

## 2022-03-27 NOTE — PROGRESS NOTES
Hepatology Treatment Plan    Elda Hernandez is a 51 y.o. female admitted to hospital 3/24/2022 (Hospital Day: 4) due to Cirrhosis of liver with ascites.     Interval History  No acute events overnight.  Abdominal pain improving.    Objective  Temp:  [97.2 °F (36.2 °C)-98.4 °F (36.9 °C)] 97.2 °F (36.2 °C) (03/27 0425)  Pulse:  [75-85] 75 (03/27 0425)  BP: (112-144)/(53-64) 112/53 (03/27 0425)  Resp:  [16-20] 20 (03/27 0438)  SpO2:  [94 %-99 %] 97 % (03/27 0425)      Laboratory    Lab Results   Component Value Date    WBC 4.79 03/27/2022    HGB 11.9 (L) 03/27/2022    HCT 35.0 (L) 03/27/2022    MCV 91 03/27/2022    PLT 95 (L) 03/27/2022       Lab Results   Component Value Date     (L) 03/27/2022    K 4.1 03/27/2022     03/27/2022    CO2 26 03/27/2022    BUN 15 03/27/2022    CREATININE 0.9 03/27/2022    CALCIUM 9.4 03/27/2022       Lab Results   Component Value Date    ALBUMIN 2.6 (L) 03/27/2022    ALT 54 (H) 03/27/2022    AST 69 (H) 03/27/2022     (H) 04/07/2018    ALKPHOS 621 (H) 03/27/2022    BILITOT 1.4 (H) 03/27/2022       Lab Results   Component Value Date    INR 1.2 03/24/2022    INR 1.0 01/29/2022    INR 1.0 10/23/2021       MELD-Na score: 10 at 3/26/2022  3:08 AM  MELD score: 10 at 3/26/2022  3:08 AM  Calculated from:  Serum Creatinine: 0.8 mg/dL (Using min of 1 mg/dL) at 3/26/2022  3:08 AM  Serum Sodium: 136 mmol/L at 3/26/2022  3:08 AM  Total Bilirubin: 1.6 mg/dL at 3/26/2022  3:08 AM  INR(ratio): 1.2 at 3/24/2022  7:17 PM  Age: 51 years    Problem List:  1. History of liver transplant in 2002  2. Immunosuppressant use  3. Abdominal pain        Assessment:  The patient presents with abdominal pain, with no obvious cause on CT.  She has small volume ascites that was not amenable to paracentesis, would recommend starting low-dose diuretics for medical management of ascites.  Her pain could be from constipation, therefore would recommend regular bowel regimen.  As far as her Prograf, would  recommend continuing at her home dose and monitoring trough levels, will adjust accordingly if needed.     Recommendations:  - Lasix 20 mg daily  - bowel regimen, start Metamucil and MiraLax daily, avoid lactulose since it may cause worsening abdominal distension and pain  - continue tacrolimus 1 mg in morning, 0.5 mg in the evening  - daily LFTs, creatinine, tacro trough level  - can be discharged from the hepatology standpoint, no further workup planned    Thank you for involving us in the care of Elda Hernandez. Please call with any additional concerns or questions.    Amy Salas MD  Gastroenterology Fellow

## 2022-03-28 ENCOUNTER — PATIENT OUTREACH (OUTPATIENT)
Dept: ADMINISTRATIVE | Facility: HOSPITAL | Age: 52
End: 2022-03-28
Payer: MEDICARE

## 2022-03-28 ENCOUNTER — PATIENT MESSAGE (OUTPATIENT)
Dept: ADMINISTRATIVE | Facility: HOSPITAL | Age: 52
End: 2022-03-28
Payer: MEDICARE

## 2022-03-28 LAB
HAV IGM SERPL QL IA: NEGATIVE
HBV CORE IGM SERPL QL IA: NEGATIVE
HBV SURFACE AG SERPL QL IA: NEGATIVE
HCV AB SERPL QL IA: NEGATIVE

## 2022-03-31 ENCOUNTER — TELEPHONE (OUTPATIENT)
Dept: CARDIOLOGY | Facility: CLINIC | Age: 52
End: 2022-03-31
Payer: MEDICARE

## 2022-03-31 NOTE — TELEPHONE ENCOUNTER
----- Message from Josephine Landry sent at 3/31/2022 11:48 AM CDT -----  Contact: Pt  Type:  Switch to virtual     Name of Caller:  Pt    Best Call Back Number:  [phone]    Additional Information:  Pt has a 2:00 appt w/ Dr. Frank today & would like to switch it to virtual.  Please call back.  Thanks.

## 2022-04-11 ENCOUNTER — LAB VISIT (OUTPATIENT)
Dept: LAB | Facility: HOSPITAL | Age: 52
End: 2022-04-11
Attending: INTERNAL MEDICINE
Payer: MEDICARE

## 2022-04-11 ENCOUNTER — OFFICE VISIT (OUTPATIENT)
Dept: FAMILY MEDICINE | Facility: CLINIC | Age: 52
End: 2022-04-11
Payer: MEDICARE

## 2022-04-11 VITALS
DIASTOLIC BLOOD PRESSURE: 80 MMHG | WEIGHT: 110.25 LBS | OXYGEN SATURATION: 98 % | HEART RATE: 86 BPM | SYSTOLIC BLOOD PRESSURE: 122 MMHG | HEIGHT: 59 IN | BODY MASS INDEX: 22.23 KG/M2

## 2022-04-11 DIAGNOSIS — Z12.31 BREAST CANCER SCREENING BY MAMMOGRAM: ICD-10-CM

## 2022-04-11 DIAGNOSIS — Z78.0 MENOPAUSE: ICD-10-CM

## 2022-04-11 DIAGNOSIS — K74.60 CIRRHOSIS OF LIVER WITH ASCITES, UNSPECIFIED HEPATIC CIRRHOSIS TYPE: ICD-10-CM

## 2022-04-11 DIAGNOSIS — K74.60 CIRRHOSIS OF LIVER WITH ASCITES, UNSPECIFIED HEPATIC CIRRHOSIS TYPE: Primary | ICD-10-CM

## 2022-04-11 DIAGNOSIS — R10.84 GENERALIZED ABDOMINAL PAIN: ICD-10-CM

## 2022-04-11 DIAGNOSIS — R18.8 CIRRHOSIS OF LIVER WITH ASCITES, UNSPECIFIED HEPATIC CIRRHOSIS TYPE: Primary | ICD-10-CM

## 2022-04-11 DIAGNOSIS — R18.8 CIRRHOSIS OF LIVER WITH ASCITES, UNSPECIFIED HEPATIC CIRRHOSIS TYPE: ICD-10-CM

## 2022-04-11 PROCEDURE — 3079F PR MOST RECENT DIASTOLIC BLOOD PRESSURE 80-89 MM HG: ICD-10-PCS | Mod: CPTII,S$GLB,, | Performed by: INTERNAL MEDICINE

## 2022-04-11 PROCEDURE — 36415 COLL VENOUS BLD VENIPUNCTURE: CPT | Mod: PO | Performed by: INTERNAL MEDICINE

## 2022-04-11 PROCEDURE — 3079F DIAST BP 80-89 MM HG: CPT | Mod: CPTII,S$GLB,, | Performed by: INTERNAL MEDICINE

## 2022-04-11 PROCEDURE — 1159F PR MEDICATION LIST DOCUMENTED IN MEDICAL RECORD: ICD-10-PCS | Mod: CPTII,S$GLB,, | Performed by: INTERNAL MEDICINE

## 2022-04-11 PROCEDURE — 3074F PR MOST RECENT SYSTOLIC BLOOD PRESSURE < 130 MM HG: ICD-10-PCS | Mod: CPTII,S$GLB,, | Performed by: INTERNAL MEDICINE

## 2022-04-11 PROCEDURE — 4010F PR ACE/ARB THEARPY RXD/TAKEN: ICD-10-PCS | Mod: CPTII,S$GLB,, | Performed by: INTERNAL MEDICINE

## 2022-04-11 PROCEDURE — 99999 PR PBB SHADOW E&M-EST. PATIENT-LVL V: CPT | Mod: PBBFAC,,, | Performed by: INTERNAL MEDICINE

## 2022-04-11 PROCEDURE — 4010F ACE/ARB THERAPY RXD/TAKEN: CPT | Mod: CPTII,S$GLB,, | Performed by: INTERNAL MEDICINE

## 2022-04-11 PROCEDURE — 3074F SYST BP LT 130 MM HG: CPT | Mod: CPTII,S$GLB,, | Performed by: INTERNAL MEDICINE

## 2022-04-11 PROCEDURE — 99214 PR OFFICE/OUTPT VISIT, EST, LEVL IV, 30-39 MIN: ICD-10-PCS | Mod: S$GLB,,, | Performed by: INTERNAL MEDICINE

## 2022-04-11 PROCEDURE — 3008F PR BODY MASS INDEX (BMI) DOCUMENTED: ICD-10-PCS | Mod: CPTII,S$GLB,, | Performed by: INTERNAL MEDICINE

## 2022-04-11 PROCEDURE — 1159F MED LIST DOCD IN RCRD: CPT | Mod: CPTII,S$GLB,, | Performed by: INTERNAL MEDICINE

## 2022-04-11 PROCEDURE — 1160F PR REVIEW ALL MEDS BY PRESCRIBER/CLIN PHARMACIST DOCUMENTED: ICD-10-PCS | Mod: CPTII,S$GLB,, | Performed by: INTERNAL MEDICINE

## 2022-04-11 PROCEDURE — 1160F RVW MEDS BY RX/DR IN RCRD: CPT | Mod: CPTII,S$GLB,, | Performed by: INTERNAL MEDICINE

## 2022-04-11 PROCEDURE — 99999 PR PBB SHADOW E&M-EST. PATIENT-LVL V: ICD-10-PCS | Mod: PBBFAC,,, | Performed by: INTERNAL MEDICINE

## 2022-04-11 PROCEDURE — 80053 COMPREHEN METABOLIC PANEL: CPT | Performed by: INTERNAL MEDICINE

## 2022-04-11 PROCEDURE — 3008F BODY MASS INDEX DOCD: CPT | Mod: CPTII,S$GLB,, | Performed by: INTERNAL MEDICINE

## 2022-04-11 PROCEDURE — 99214 OFFICE O/P EST MOD 30 MIN: CPT | Mod: S$GLB,,, | Performed by: INTERNAL MEDICINE

## 2022-04-11 RX ORDER — FUROSEMIDE 20 MG/1
20 TABLET ORAL DAILY
Qty: 30 TABLET | Refills: 2 | Status: SHIPPED | OUTPATIENT
Start: 2022-04-11 | End: 2022-09-15
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
## 2022-04-12 LAB
ALBUMIN SERPL BCP-MCNC: 2.6 G/DL (ref 3.5–5.2)
ALP SERPL-CCNC: 453 U/L (ref 55–135)
ALT SERPL W/O P-5'-P-CCNC: 54 U/L (ref 10–44)
ANION GAP SERPL CALC-SCNC: 8 MMOL/L (ref 8–16)
AST SERPL-CCNC: 74 U/L (ref 10–40)
BILIRUB SERPL-MCNC: 1.5 MG/DL (ref 0.1–1)
BUN SERPL-MCNC: 25 MG/DL (ref 6–20)
CALCIUM SERPL-MCNC: 9.2 MG/DL (ref 8.7–10.5)
CHLORIDE SERPL-SCNC: 105 MMOL/L (ref 95–110)
CO2 SERPL-SCNC: 28 MMOL/L (ref 23–29)
CREAT SERPL-MCNC: 0.8 MG/DL (ref 0.5–1.4)
EST. GFR  (AFRICAN AMERICAN): >60 ML/MIN/1.73 M^2
EST. GFR  (NON AFRICAN AMERICAN): >60 ML/MIN/1.73 M^2
GLUCOSE SERPL-MCNC: 98 MG/DL (ref 70–110)
POTASSIUM SERPL-SCNC: 4.1 MMOL/L (ref 3.5–5.1)
PROT SERPL-MCNC: 6.1 G/DL (ref 6–8.4)
SODIUM SERPL-SCNC: 141 MMOL/L (ref 136–145)

## 2022-04-19 DIAGNOSIS — K74.60 CIRRHOSIS OF LIVER WITH ASCITES, UNSPECIFIED HEPATIC CIRRHOSIS TYPE: ICD-10-CM

## 2022-04-19 DIAGNOSIS — R18.8 CIRRHOSIS OF LIVER WITH ASCITES, UNSPECIFIED HEPATIC CIRRHOSIS TYPE: ICD-10-CM

## 2022-04-19 RX ORDER — FUROSEMIDE 20 MG/1
20 TABLET ORAL DAILY
Qty: 30 TABLET | Refills: 2 | OUTPATIENT
Start: 2022-04-19 | End: 2023-04-19

## 2022-04-19 NOTE — TELEPHONE ENCOUNTER
Damir DC. Request already responded to by other means (e.g. phone or fax)   Refill Authorization Note   Elda Hernandez  is requesting a refill authorization.  Brief Assessment and Rationale for Refill:  Quick Discontinue  Medication Therapy Plan:       Medication Reconciliation Completed:  No      Comments:     Note composed:5:09 PM 04/19/2022

## 2022-04-19 NOTE — TELEPHONE ENCOUNTER
No new care gaps identified.  Powered by [employer]. Reference number: [id number].   4/19/2022 3:18:47 PM CDT

## 2022-04-26 ENCOUNTER — DOCUMENTATION ONLY (OUTPATIENT)
Dept: FAMILY MEDICINE | Facility: CLINIC | Age: 52
End: 2022-04-26
Payer: MEDICARE

## 2022-04-26 NOTE — PROGRESS NOTES
APPROVED    PA initiated in [facility] for butalbital/aspirin/caffeine   KEY:[id number]  CASE:[id number]    Elda Hernandez Key: [id number] - PA Case ID: [id number] Need help? Call us at [phone]   Outcome   Approvedtoday   CaseId:[id number];Status:Approved;Review Type:Prior Auth;Coverage Start Date:03/27/2022;Coverage End Date:04/26/2023;

## 2022-05-09 ENCOUNTER — PATIENT MESSAGE (OUTPATIENT)
Dept: SMOKING CESSATION | Facility: CLINIC | Age: 52
End: 2022-05-09
Payer: MEDICARE

## 2022-05-11 ENCOUNTER — PATIENT MESSAGE (OUTPATIENT)
Dept: RESEARCH | Facility: CLINIC | Age: 52
End: 2022-05-11
Payer: MEDICARE

## 2022-05-28 ENCOUNTER — HOSPITAL ENCOUNTER (OUTPATIENT)
Dept: RADIOLOGY | Facility: HOSPITAL | Age: 52
Discharge: HOME | End: 2022-05-28
Attending: INTERNAL MEDICINE
Payer: MEDICARE

## 2022-05-28 DIAGNOSIS — Z12.31 BREAST CANCER SCREENING BY MAMMOGRAM: ICD-10-CM

## 2022-05-28 PROCEDURE — 77063 BREAST TOMOSYNTHESIS BI: CPT | Mod: TC,PO

## 2022-05-28 PROCEDURE — 77063 BREAST TOMOSYNTHESIS BI: CPT | Mod: 26,,, | Performed by: RADIOLOGY

## 2022-05-28 PROCEDURE — 77067 SCR MAMMO BI INCL CAD: CPT | Mod: TC,PO

## 2022-05-28 PROCEDURE — 77063 MAMMO DIGITAL SCREENING BILAT WITH TOMO: ICD-10-PCS | Mod: 26,,, | Performed by: RADIOLOGY

## 2022-05-28 PROCEDURE — 77067 SCR MAMMO BI INCL CAD: CPT | Mod: 26,,, | Performed by: RADIOLOGY

## 2022-05-28 PROCEDURE — 77067 MAMMO DIGITAL SCREENING BILAT WITH TOMO: ICD-10-PCS | Mod: 26,,, | Performed by: RADIOLOGY

## 2022-05-31 ENCOUNTER — TELEPHONE (OUTPATIENT)
Dept: TRANSPLANT | Facility: CLINIC | Age: 52
End: 2022-05-31
Payer: MEDICARE

## 2022-05-31 ENCOUNTER — PATIENT MESSAGE (OUTPATIENT)
Dept: TRANSPLANT | Facility: CLINIC | Age: 52
End: 2022-05-31
Payer: MEDICARE

## 2022-05-31 NOTE — TELEPHONE ENCOUNTER
Sent patient message via portal to let her know labs are stable.  No medication changes, next labs due on 08/13/22      ----- Message from Jaya Nielsen MD sent at 5/29/2022  6:40 PM CDT -----  Results reviewed. No action.

## 2022-06-06 ENCOUNTER — TELEPHONE (OUTPATIENT)
Dept: OBSTETRICS AND GYNECOLOGY | Facility: CLINIC | Age: 52
End: 2022-06-06
Payer: MEDICARE

## 2022-06-06 NOTE — TELEPHONE ENCOUNTER
----- Message from Josephine Landyr sent at 6/6/2022 10:47 AM CDT -----  Contact: Pt  Type:  Sooner Appointment Request    Name of Caller:  Pt  When is the first available appointment?  Pt has appt on 6/27  Symptoms:  Greenish-yellow discharge, vaginal dryness  Best Call Back Number:  [phone]  Additional Information:  Please call back.  Thanks.

## 2022-06-13 ENCOUNTER — TELEPHONE (OUTPATIENT)
Dept: OBSTETRICS AND GYNECOLOGY | Facility: CLINIC | Age: 52
End: 2022-06-13
Payer: MEDICARE

## 2022-06-13 NOTE — TELEPHONE ENCOUNTER
----- Message from Lesvia Browne sent at 6/13/2022  4:08 PM CDT -----  Contact: JUAN LUIS WICK [[id number]]  Type:  Patient Returning Call    Who Called:JUAN LUIS WICK [[id number]]    Who Left Message for Patient:Tonya Angel LPN     Does the patient know what this is regarding?:    Would the patient rather a call back or a response via [facility]?     Best Call Back Number: [phone] (mobile)    Additional Information:

## 2022-07-22 DIAGNOSIS — E03.4 HYPOTHYROIDISM DUE TO ACQUIRED ATROPHY OF THYROID: Chronic | ICD-10-CM

## 2022-07-22 DIAGNOSIS — R11.0 NAUSEA: ICD-10-CM

## 2022-07-22 NOTE — TELEPHONE ENCOUNTER
No new care gaps identified.  [facility] Embedded Care Gaps. Reference number: [id number]. 7/22/2022   5:12:22 PM CDT

## 2022-07-24 RX ORDER — LEVOTHYROXINE SODIUM 75 UG/1
TABLET ORAL
Qty: 90 TABLET | Refills: 3 | Status: SHIPPED | OUTPATIENT
Start: 2022-07-24 | End: 2023-08-11 | Stop reason: SDUPTHER

## 2022-07-24 RX ORDER — PROMETHAZINE HYDROCHLORIDE 25 MG/1
TABLET ORAL
Qty: 30 TABLET | Refills: 0 | Status: SHIPPED | OUTPATIENT
Start: 2022-07-24 | End: 2022-11-06

## 2022-07-24 NOTE — TELEPHONE ENCOUNTER
Refill Routing Note   Medication(s) are not appropriate for processing by [facility] for the following reason(s):      - Outside of protocol  - Required laboratory values are outdated    [facility] action(s):  Defer  Route Medication-related problems identified: Requires labs     Medication Therapy Plan: Defer synthroid - overdue lab. Route phenergan - OOS.  Medication reconciliation completed: No     Appointments  past 12m or future 3m with PCP    Date Provider   Last Visit   4/11/2022 David Lorenzo MD   Next Visit   8/8/2022 David Lorenzo MD   ED visits in past 90 days: 0        Note composed:10:40 PM 07/23/2022

## 2022-07-28 ENCOUNTER — OFFICE VISIT (OUTPATIENT)
Dept: CARDIOLOGY | Facility: CLINIC | Age: 52
End: 2022-07-28
Payer: MEDICARE

## 2022-07-28 ENCOUNTER — TELEPHONE (OUTPATIENT)
Dept: CARDIOLOGY | Facility: CLINIC | Age: 52
End: 2022-07-28
Payer: MEDICARE

## 2022-07-28 VITALS
DIASTOLIC BLOOD PRESSURE: 76 MMHG | WEIGHT: 108.94 LBS | HEIGHT: 59 IN | SYSTOLIC BLOOD PRESSURE: 152 MMHG | HEART RATE: 78 BPM | BODY MASS INDEX: 21.96 KG/M2

## 2022-07-28 DIAGNOSIS — I44.7 LBBB (LEFT BUNDLE BRANCH BLOCK): Chronic | ICD-10-CM

## 2022-07-28 DIAGNOSIS — I10 ESSENTIAL HYPERTENSION: Chronic | ICD-10-CM

## 2022-07-28 DIAGNOSIS — I65.21 STENOSIS OF RIGHT CAROTID ARTERY: ICD-10-CM

## 2022-07-28 DIAGNOSIS — E78.00 HYPERCHOLESTEROLEMIA: Chronic | ICD-10-CM

## 2022-07-28 DIAGNOSIS — I08.0 MITRAL AND AORTIC REGURGITATION: Primary | Chronic | ICD-10-CM

## 2022-07-28 PROCEDURE — 1159F MED LIST DOCD IN RCRD: CPT | Mod: CPTII,S$GLB,, | Performed by: INTERNAL MEDICINE

## 2022-07-28 PROCEDURE — 1159F PR MEDICATION LIST DOCUMENTED IN MEDICAL RECORD: ICD-10-PCS | Mod: CPTII,S$GLB,, | Performed by: INTERNAL MEDICINE

## 2022-07-28 PROCEDURE — 3078F DIAST BP <80 MM HG: CPT | Mod: CPTII,S$GLB,, | Performed by: INTERNAL MEDICINE

## 2022-07-28 PROCEDURE — 99999 PR PBB SHADOW E&M-EST. PATIENT-LVL III: ICD-10-PCS | Mod: PBBFAC,,, | Performed by: INTERNAL MEDICINE

## 2022-07-28 PROCEDURE — 3077F PR MOST RECENT SYSTOLIC BLOOD PRESSURE >= 140 MM HG: ICD-10-PCS | Mod: CPTII,S$GLB,, | Performed by: INTERNAL MEDICINE

## 2022-07-28 PROCEDURE — 3008F PR BODY MASS INDEX (BMI) DOCUMENTED: ICD-10-PCS | Mod: CPTII,S$GLB,, | Performed by: INTERNAL MEDICINE

## 2022-07-28 PROCEDURE — 4010F PR ACE/ARB THEARPY RXD/TAKEN: ICD-10-PCS | Mod: CPTII,S$GLB,, | Performed by: INTERNAL MEDICINE

## 2022-07-28 PROCEDURE — 3008F BODY MASS INDEX DOCD: CPT | Mod: CPTII,S$GLB,, | Performed by: INTERNAL MEDICINE

## 2022-07-28 PROCEDURE — 4010F ACE/ARB THERAPY RXD/TAKEN: CPT | Mod: CPTII,S$GLB,, | Performed by: INTERNAL MEDICINE

## 2022-07-28 PROCEDURE — 3078F PR MOST RECENT DIASTOLIC BLOOD PRESSURE < 80 MM HG: ICD-10-PCS | Mod: CPTII,S$GLB,, | Performed by: INTERNAL MEDICINE

## 2022-07-28 PROCEDURE — 99214 PR OFFICE/OUTPT VISIT, EST, LEVL IV, 30-39 MIN: ICD-10-PCS | Mod: S$GLB,,, | Performed by: INTERNAL MEDICINE

## 2022-07-28 PROCEDURE — 99214 OFFICE O/P EST MOD 30 MIN: CPT | Mod: S$GLB,,, | Performed by: INTERNAL MEDICINE

## 2022-07-28 PROCEDURE — 99999 PR PBB SHADOW E&M-EST. PATIENT-LVL III: CPT | Mod: PBBFAC,,, | Performed by: INTERNAL MEDICINE

## 2022-07-28 PROCEDURE — 3077F SYST BP >= 140 MM HG: CPT | Mod: CPTII,S$GLB,, | Performed by: INTERNAL MEDICINE

## 2022-07-28 RX ORDER — RAMIPRIL 5 MG/1
5 CAPSULE ORAL NIGHTLY
Qty: 90 CAPSULE | Refills: 1 | Status: SHIPPED | OUTPATIENT
Start: 2022-07-28 | End: 2023-02-10

## 2022-07-28 RX ORDER — PRAVASTATIN SODIUM 10 MG/1
10 TABLET ORAL DAILY
Qty: 90 TABLET | Refills: 1 | Status: SHIPPED | OUTPATIENT
Start: 2022-07-28 | End: 2023-04-06

## 2022-07-28 NOTE — PROGRESS NOTES
Subjective:    Patient ID:  Elda Hernandez is a 52 y.o. female who presents for Follow-up (TEST RESULTS ), Valvular Heart Disease, and Shortness of Breath        HPI  DISCUSSED TESTS, NORMAL STRESS, ECHO NORMAL LV FX, MOD MR, MILD AI, 40-49% IRAJ STENOSIS, OVERALL DOING OKAY FROM LIVER STANDPOINT, NO CHEST PAIN NO SHORTNESS OF BREATH, SEE REVIEW OF SYSTEMS    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    Arnold-Chiari malformation     Depression     Esophageal stricture     Essential tremor     Hypertension     Left bundle branch block     Liver fibrosis, transplanted liver 10/2/2018    Suggested on fibroscan 10/2/18    Migraine without aura     MVP (mitral valve prolapse)     Non-rheumatic mitral regurgitation 10/1/2018    Non-rheumatic tricuspid valve insufficiency 10/1/2018    Osteoporosis     Recurrent urinary tract infection     Seizures     Shingles 2007    SIADH (syndrome of inappropriate ADH production)     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    Tricuspid valve prolapse     Urolithiasis     Von Gierke disease     s/p liver transplant     Past Surgical History:   Procedure Laterality Date    APPENDECTOMY  6/22/2007    APPLICATION OF WOUND VACUUM-ASSISTED CLOSURE DEVICE N/A 9/18/2020    Procedure: APPLICATION, WOUND VAC;  Surgeon: Zain Decker MD;  Location: [facility] OR [address] [place] [address];  Service: General;  Laterality: N/A;    COLONOSCOPY  5/13/2008    internal hemorrhoids    CRANIOTOMY      ESOPHAGOGASTRODUODENOSCOPY N/A 9/3/2020    Procedure: EGD (ESOPHAGOGASTRODUODENOSCOPY);  Surgeon: Tyrel Vergara MD;  Location: [facility];  Service: Endoscopy;  Laterality: N/A;    EXPLORATORY LAPAROTOMY  2020    due to perforated stomach    LAMINECTOMY  3/2001    LIVER TRANSPLANT  9/23/2002    OSSICULAR RECONSTRUCTION  10/4/1995    RIGHT REPLACEMENT PROSTHESIS for cholesteatoma    THYMECTOMY  5/2/2007    TONSILLECTOMY, ADENOIDECTOMY  1/21/2004     TOTAL ABDOMINAL HYSTERECTOMY  3/31/1994     Family History   Problem Relation Age of Onset    Stroke Mother     Heart disease Mother     No Known Problems Father      Social History     Socioeconomic History    Marital status:    Tobacco Use    Smoking status: Never Smoker    Smokeless tobacco: Never Used   Substance and Sexual Activity    Alcohol use: No    Drug use: No       Review of patient's allergies indicates:   Allergen Reactions    Codeine Itching     Other reaction(s): Itching    Lipitor [atorvastatin] Other (See Comments)     Other reaction(s): Muscle pain  Muscle cranmps    Morphine Itching     Other reaction(s): nausea and vomiting     Zoloft [sertraline] Other (See Comments)     Tremors/muscle spasms       Current Outpatient Medications:     acetaminophen (TYLENOL) 325 MG tablet, Take 2 tablets (650 mg total) by mouth every 6 (six) hours as needed. (Patient taking differently: Take 650 mg by mouth 2 (two) times daily as needed (headache).), Disp: , Rfl: 0    butalbital-aspirin-caffeine -40 mg (FIORINAL) -40 mg Cap, TAKE 2 CAPSULES BY MOUTH EVERY 6 HOURS AS NEEDED FOR HEADACHE, Disp: 120 capsule, Rfl: 0    clonazePAM (KLONOPIN) 0.5 MG tablet, Take 1 tablet (0.5 mg total) by mouth every evening. (Patient taking differently: Take 0.5 mg by mouth nightly as needed for Anxiety.), Disp: 30 tablet, Rfl: 3    flu vacc [id number] 6mos up,PF, (FLUARIX QUAD [id number], PF,) 60 mcg (15 mcg x 4)/0.5 mL Syrg, Inject 0.5 mLs into the muscle., Disp: 0.5 mL, Rfl: 0    furosemide (LASIX) 20 MG tablet, Take 1 tablet (20 mg total) by mouth once daily., Disp: 30 tablet, Rfl: 2    hydrocortisone 2.5 % cream, Apply topically to affected area twice daily as needed, Disp: , Rfl:     levothyroxine (SYNTHROID) 75 MCG tablet, TAKE 1 TABLET(75 MCG) BY MOUTH EVERY DAY, Disp: 90 tablet, Rfl: 3    magnesium oxide (MAG-OX) 400 mg (241.3 mg magnesium) tablet, TAKE 1 TABLET(400 MG) BY MOUTH TWICE  DAILY, Disp: 180 tablet, Rfl: 3    multivitamin capsule, Take 1 capsule by mouth once daily., Disp: , Rfl:     ondansetron (ZOFRAN-ODT) 4 MG TbDL, DISSOLVE 1 TABLET(4 MG) ON THE TONGUE EVERY 6 HOURS AS NEEDED, Disp: 30 tablet, Rfl: 0    pantoprazole (PROTONIX) 40 MG tablet, Take 1 tablet (40 mg total) by mouth once daily., Disp: 90 tablet, Rfl: 3    polyethylene glycol (GLYCOLAX) 17 gram/dose powder, MIX 17 GRAMS (1 capful) IN LIQUID AND DRINK BY MOUTH TWICE DAILY, Disp: 1020 g, Rfl: 2    promethazine (PHENERGAN) 25 MG tablet, TAKE 1 TABLET BY MOUTH EVERY 4 HOURS AS NEEDED IF UNRELIEVED BY ZOFRAN, Disp: 30 tablet, Rfl: 0    psyllium husk, aspartame, (METAMUCIL) 3.4 gram PwPk packet, Take 1 packet by mouth once daily., Disp: 30 packet, Rfl: 0    tacrolimus (PROGRAF) 0.5 MG Cap, TAKE 2 CAPSULES BY MOUTH EVERY MORNING AND 1 CAPSULE EVERY EVENING, Disp: 90 capsule, Rfl: 11    venlafaxine (EFFEXOR-XR) 150 MG Cp24, TAKE 1 CAPSULE(150 MG) BY MOUTH EVERY DAY, Disp: 90 capsule, Rfl: 3    calcium carbonate (TUMS) 200 mg calcium (500 mg) chewable tablet, Take 1 tablet (500 mg total) by mouth 2 (two) times daily as needed. (Patient taking differently: Take 1,500 mg by mouth daily as needed for Heartburn.), Disp: , Rfl:     gentian violet 2 % topical solution, Use as directed 0.5 mLs in the mouth or throat 4 (four) times daily. (Patient not taking: Reported on 7/28/2022), Disp: 59 mL, Rfl: 0    pravastatin (PRAVACHOL) 10 MG tablet, Take 1 tablet (10 mg total) by mouth once daily., Disp: 90 tablet, Rfl: 1    ramipriL (ALTACE) 5 MG capsule, Take 1 capsule (5 mg total) by mouth every evening., Disp: 90 capsule, Rfl: 1    valACYclovir (VALTREX) 1000 MG tablet, TAKE 2 TABLETS(2000 MG) BY MOUTH TWICE DAILY FOR 2 DOSES (Patient not taking: Reported on 7/28/2022), Disp: 4 tablet, Rfl: 3    Review of Systems   Constitutional: Negative for chills, diaphoresis, fever, malaise/fatigue and night sweats.   HENT: Negative for  "congestion and nosebleeds.    Eyes: Negative for blurred vision and visual disturbance.   Cardiovascular: Negative for chest pain, claudication, cyanosis, dyspnea on exertion (MILD), irregular heartbeat, leg swelling, near-syncope, orthopnea, palpitations, paroxysmal nocturnal dyspnea and syncope.   Respiratory: Negative for cough, hemoptysis, shortness of breath and wheezing.    Hematologic/Lymphatic: Negative for adenopathy. Does not bruise/bleed easily.   Skin: Negative for color change and rash.   Musculoskeletal: Negative for back pain (SOME SPINAL STENOSIS) and falls.   Gastrointestinal: Positive for abdominal pain (ASCITES). Negative for change in bowel habit, dysphagia (OCC), jaundice, melena and nausea.        LIVER CIRRHOSIS   Genitourinary: Negative for dysuria and flank pain.   Neurological: Negative for brief paralysis, focal weakness, light-headedness, loss of balance and weakness.        FOGGY   Psychiatric/Behavioral: Negative for altered mental status and depression.   Allergic/Immunologic: Negative.         Objective:      Vitals:    07/28/22 1700 07/28/22 1716   BP: (!) 165/76 (!) 152/76   Pulse: 78    Weight: 49.4 kg (108 lb 14.5 oz)    Height: 4' 11" (1.499 m)    PainSc: 0-No pain      Body mass index is 22 kg/m².    Physical Exam  Constitutional:       Appearance: Normal appearance.   HENT:      Head: Atraumatic.   Eyes:      General: No scleral icterus.     Extraocular Movements: Extraocular movements intact.   Neck:      Vascular: Carotid bruit present.   Cardiovascular:      Rate and Rhythm: Normal rate and regular rhythm.      Pulses:           Carotid pulses are 2+ on the right side with bruit and 2+ on the left side.       Radial pulses are 2+ on the right side and 2+ on the left side.        Posterior tibial pulses are 2+ on the right side and 2+ on the left side.      Heart sounds: Murmur heard.    Systolic murmur is present.   Diastolic murmur is present.    No friction rub. No gallop. "   Pulmonary:      Effort: Pulmonary effort is normal.      Breath sounds: Normal breath sounds. No rales.   Abdominal:      Palpations: Abdomen is soft.      Tenderness: There is no abdominal tenderness.   Musculoskeletal:      Cervical back: Neck supple.      Right lower leg: No edema.      Left lower leg: No edema.   Skin:     General: Skin is warm and dry.      Capillary Refill: Capillary refill takes less than 2 seconds.      Coloration: Skin is pale.   Neurological:      General: No focal deficit present.      Mental Status: She is alert and oriented to person, place, and time.   Psychiatric:         Mood and Affect: Mood normal.         Behavior: Behavior normal.                 ..    Chemistry        Component Value Date/Time     05/28/2022 1201    K 3.5 05/28/2022 1201     05/28/2022 1201    CO2 25 05/28/2022 1201    BUN 26 (H) 05/28/2022 1201    CREATININE 0.7 05/28/2022 1201    GLU 78 05/28/2022 1201        Component Value Date/Time    CALCIUM 9.2 05/28/2022 1201    ALKPHOS 450 (H) 05/28/2022 1201    AST 46 (H) 05/28/2022 1201    ALT 39 05/28/2022 1201    BILITOT 1.4 (H) 05/28/2022 1201    ESTGFRAFRICA >60.0 05/28/2022 1201    EGFRNONAA >60.0 05/28/2022 1201            ..  Lab Results   Component Value Date    CHOL 298 (H) 05/12/2017    CHOL 199 08/02/2014    CHOL 242 (H) 03/26/2011     Lab Results   Component Value Date     (H) 05/12/2017    HDL 74 08/02/2014    HDL 73 03/26/2011     Lab Results   Component Value Date    LDLCALC 147.2 05/12/2017    LDLCALC 109.0 08/02/2014    LDLCALC 141.8 (H) 03/26/2011     Lab Results   Component Value Date    TRIG 90 10/10/2020    TRIG 409 (H) 10/07/2020    TRIG 142 10/06/2020     Lab Results   Component Value Date    CHOLHDL 45.0 05/12/2017    CHOLHDL 37.2 08/02/2014    CHOLHDL 30.2 03/26/2011     ..  Lab Results   Component Value Date    WBC 2.84 (L) 05/28/2022    HGB 11.7 (L) 05/28/2022    HCT 34.8 (L) 05/28/2022    MCV 91 05/28/2022    PLT 81 (L)  05/28/2022       Test(s) Reviewed  I have reviewed the following in detail:  [] Stress test   [] Angiography   [] Echocardiogram   [] Labs   [] Other:       Assessment:         ICD-10-CM ICD-9-CM   1. Mitral and aortic regurgitation  I08.0 396.3   2. Stenosis of right carotid artery  I65.21 433.10   3. Hypercholesterolemia  E78.00 272.0   4. Essential hypertension  I10 401.9   5. LBBB (left bundle branch block)  I44.7 426.3     Problem List Items Addressed This Visit        Cardiac/Vascular    Essential hypertension    LBBB (left bundle branch block)    Mitral and aortic regurgitation - Primary    Stenosis of right carotid artery    Hypercholesterolemia           Plan:     INCREASE RAMIPRIL TO 5 MG, WATCH BLOOD PRESSURE, CONSIDER LOW-DOSE STATIN IN VIEW OF CAROTID STENOSIS PRAVACHOL MIGHT BE BEST OPTION IN THIS PATIENT WITH LIVER DISEASE, IF OK WITH TRANSPLANT TEAM, PATIENT CHECK, NO ANGINA NO OVERT HEART FAILURE NO TIA WITH NO SYNCOPE, DISCUSSED PLAN THE PATIENT  RETURN TO CLINIC IN 6 MONTHS      Mitral and aortic regurgitation    Stenosis of right carotid artery    Hypercholesterolemia  Comments:  CONSIDER MEDS    Essential hypertension    LBBB (left bundle branch block)    Other orders  -     ramipriL (ALTACE) 5 MG capsule; Take 1 capsule (5 mg total) by mouth every evening.  Dispense: 90 capsule; Refill: 1  -     pravastatin (PRAVACHOL) 10 MG tablet; Take 1 tablet (10 mg total) by mouth once daily.  Dispense: 90 tablet; Refill: 1    RTC Low level/low impact aerobic exercise 5x's/wk. Heart healthy diet and risk factor modification.    See labs and med orders.    Aerobic exercise 5x's/wk. Heart healthy diet and risk factor modification.    See labs and med orders.

## 2022-07-28 NOTE — TELEPHONE ENCOUNTER
----- Message from Nadira Harris MA sent at 7/28/2022  2:01 PM CDT -----  Contact: Self    ----- Message -----  From: Jamarcus Staley  Sent: 7/28/2022   1:00 PM CDT  To: Zac BARRETT Staff    Type: Needs Medical Advice  Who Called:  Patient  Best Call Back Number: [phone]   Additional Information: Would like to r/s 7/28 appt to 8/9 at 3pm to come in with spouse Kody due to transportation issues.

## 2022-08-13 ENCOUNTER — LAB VISIT (OUTPATIENT)
Dept: LAB | Facility: HOSPITAL | Age: 52
End: 2022-08-13
Attending: INTERNAL MEDICINE
Payer: MEDICARE

## 2022-08-13 DIAGNOSIS — Z94.4 LIVER REPLACED BY TRANSPLANT: ICD-10-CM

## 2022-08-13 LAB
ALBUMIN SERPL BCP-MCNC: 2.8 G/DL (ref 3.5–5.2)
ALP SERPL-CCNC: 429 U/L (ref 55–135)
ALT SERPL W/O P-5'-P-CCNC: 80 U/L (ref 10–44)
ANION GAP SERPL CALC-SCNC: 7 MMOL/L (ref 8–16)
AST SERPL-CCNC: 100 U/L (ref 10–40)
BASOPHILS # BLD AUTO: 0.04 K/UL (ref 0–0.2)
BASOPHILS NFR BLD: 1.4 % (ref 0–1.9)
BILIRUB SERPL-MCNC: 1.5 MG/DL (ref 0.1–1)
BUN SERPL-MCNC: 28 MG/DL (ref 6–20)
CALCIUM SERPL-MCNC: 9.8 MG/DL (ref 8.7–10.5)
CHLORIDE SERPL-SCNC: 106 MMOL/L (ref 95–110)
CO2 SERPL-SCNC: 26 MMOL/L (ref 23–29)
CREAT SERPL-MCNC: 0.8 MG/DL (ref 0.5–1.4)
DIFFERENTIAL METHOD: ABNORMAL
EOSINOPHIL # BLD AUTO: 0.3 K/UL (ref 0–0.5)
EOSINOPHIL NFR BLD: 9.7 % (ref 0–8)
ERYTHROCYTE [DISTWIDTH] IN BLOOD BY AUTOMATED COUNT: 15 % (ref 11.5–14.5)
EST. GFR  (NO RACE VARIABLE): >60 ML/MIN/1.73 M^2
GLUCOSE SERPL-MCNC: 87 MG/DL (ref 70–110)
HCT VFR BLD AUTO: 35.3 % (ref 37–48.5)
HGB BLD-MCNC: 11.5 G/DL (ref 12–16)
IMM GRANULOCYTES # BLD AUTO: 0 K/UL (ref 0–0.04)
IMM GRANULOCYTES NFR BLD AUTO: 0 % (ref 0–0.5)
LYMPHOCYTES # BLD AUTO: 0.6 K/UL (ref 1–4.8)
LYMPHOCYTES NFR BLD: 22.9 % (ref 18–48)
MCH RBC QN AUTO: 29.5 PG (ref 27–31)
MCHC RBC AUTO-ENTMCNC: 32.6 G/DL (ref 32–36)
MCV RBC AUTO: 91 FL (ref 82–98)
MONOCYTES # BLD AUTO: 0.4 K/UL (ref 0.3–1)
MONOCYTES NFR BLD: 15.4 % (ref 4–15)
NEUTROPHILS # BLD AUTO: 1.4 K/UL (ref 1.8–7.7)
NEUTROPHILS NFR BLD: 50.6 % (ref 38–73)
NRBC BLD-RTO: 0 /100 WBC
PLATELET # BLD AUTO: 73 K/UL (ref 150–450)
PMV BLD AUTO: 12.8 FL (ref 9.2–12.9)
POTASSIUM SERPL-SCNC: 4 MMOL/L (ref 3.5–5.1)
PROT SERPL-MCNC: 6.7 G/DL (ref 6–8.4)
RBC # BLD AUTO: 3.9 M/UL (ref 4–5.4)
SODIUM SERPL-SCNC: 139 MMOL/L (ref 136–145)
WBC # BLD AUTO: 2.79 K/UL (ref 3.9–12.7)

## 2022-08-13 PROCEDURE — 85025 COMPLETE CBC W/AUTO DIFF WBC: CPT | Performed by: INTERNAL MEDICINE

## 2022-08-13 PROCEDURE — 80197 ASSAY OF TACROLIMUS: CPT | Performed by: INTERNAL MEDICINE

## 2022-08-13 PROCEDURE — 80053 COMPREHEN METABOLIC PANEL: CPT | Performed by: INTERNAL MEDICINE

## 2022-08-13 PROCEDURE — 36415 COLL VENOUS BLD VENIPUNCTURE: CPT | Mod: PO | Performed by: INTERNAL MEDICINE

## 2022-08-14 LAB — TACROLIMUS BLD-MCNC: 9.5 NG/ML (ref 5–15)

## 2022-08-15 ENCOUNTER — TELEPHONE (OUTPATIENT)
Dept: TRANSPLANT | Facility: CLINIC | Age: 52
End: 2022-08-15
Payer: MEDICARE

## 2022-08-15 DIAGNOSIS — Z94.4 LIVER REPLACED BY TRANSPLANT: Primary | ICD-10-CM

## 2022-08-15 DIAGNOSIS — C22.0 HEPATOCELLULAR CARCINOMA: ICD-10-CM

## 2022-08-15 NOTE — TELEPHONE ENCOUNTER
Sent patient message via portal to let her know labs are stable.  No medication changes, next labs due on 11/05/22      ----- Message from Jaya Nielsen MD sent at 8/14/2022  8:36 AM CDT -----  Results reviewed. No action.

## 2022-09-07 ENCOUNTER — PES CALL (OUTPATIENT)
Dept: ADMINISTRATIVE | Facility: CLINIC | Age: 52
End: 2022-09-07
Payer: MEDICARE

## 2022-09-16 ENCOUNTER — PATIENT OUTREACH (OUTPATIENT)
Dept: ADMINISTRATIVE | Facility: HOSPITAL | Age: 52
End: 2022-09-16
Payer: MEDICARE

## 2022-09-16 NOTE — PROGRESS NOTES
Pre-Visit Chart Review  For Appointment Scheduled on 9/30/2022    Health Maintenance Due   Topic    TETANUS VACCINE     Pneumococcal Vaccines (Age 0-64) (3 - PPSV23 or PCV20)    Shingles Vaccine (2 of 2)    Influenza Vaccine (1)    COVID-19 Vaccine (5 - Booster for Pfizer series)       
well

## 2022-11-04 ENCOUNTER — PATIENT MESSAGE (OUTPATIENT)
Dept: FAMILY MEDICINE | Facility: CLINIC | Age: 52
End: 2022-11-04
Payer: MEDICARE

## 2022-11-12 ENCOUNTER — LAB VISIT (OUTPATIENT)
Dept: LAB | Facility: HOSPITAL | Age: 52
End: 2022-11-12
Attending: INTERNAL MEDICINE
Payer: MEDICARE

## 2022-11-12 DIAGNOSIS — C22.0 HEPATOCELLULAR CARCINOMA: ICD-10-CM

## 2022-11-12 DIAGNOSIS — Z94.4 LIVER REPLACED BY TRANSPLANT: ICD-10-CM

## 2022-11-12 LAB
AFP SERPL-MCNC: 2.6 NG/ML (ref 0–8.4)
ALBUMIN SERPL BCP-MCNC: 2.8 G/DL (ref 3.5–5.2)
ALP SERPL-CCNC: 432 U/L (ref 55–135)
ALT SERPL W/O P-5'-P-CCNC: 66 U/L (ref 10–44)
ANION GAP SERPL CALC-SCNC: 10 MMOL/L (ref 8–16)
AST SERPL-CCNC: 112 U/L (ref 10–40)
BASOPHILS # BLD AUTO: 0.03 K/UL (ref 0–0.2)
BASOPHILS NFR BLD: 1 % (ref 0–1.9)
BILIRUB SERPL-MCNC: 1.4 MG/DL (ref 0.1–1)
BUN SERPL-MCNC: 27 MG/DL (ref 6–20)
CALCIUM SERPL-MCNC: 9.2 MG/DL (ref 8.7–10.5)
CHLORIDE SERPL-SCNC: 107 MMOL/L (ref 95–110)
CO2 SERPL-SCNC: 23 MMOL/L (ref 23–29)
CREAT SERPL-MCNC: 0.8 MG/DL (ref 0.5–1.4)
DIFFERENTIAL METHOD: ABNORMAL
EOSINOPHIL # BLD AUTO: 0.2 K/UL (ref 0–0.5)
EOSINOPHIL NFR BLD: 7.2 % (ref 0–8)
ERYTHROCYTE [DISTWIDTH] IN BLOOD BY AUTOMATED COUNT: 14.6 % (ref 11.5–14.5)
EST. GFR  (NO RACE VARIABLE): >60 ML/MIN/1.73 M^2
GLUCOSE SERPL-MCNC: 87 MG/DL (ref 70–110)
HCT VFR BLD AUTO: 37.6 % (ref 37–48.5)
HGB BLD-MCNC: 12 G/DL (ref 12–16)
IMM GRANULOCYTES # BLD AUTO: 0 K/UL (ref 0–0.04)
IMM GRANULOCYTES NFR BLD AUTO: 0 % (ref 0–0.5)
INR PPP: 1.1 (ref 0.8–1.2)
LYMPHOCYTES # BLD AUTO: 0.8 K/UL (ref 1–4.8)
LYMPHOCYTES NFR BLD: 26.5 % (ref 18–48)
MCH RBC QN AUTO: 30.3 PG (ref 27–31)
MCHC RBC AUTO-ENTMCNC: 31.9 G/DL (ref 32–36)
MCV RBC AUTO: 95 FL (ref 82–98)
MONOCYTES # BLD AUTO: 0.3 K/UL (ref 0.3–1)
MONOCYTES NFR BLD: 9.3 % (ref 4–15)
NEUTROPHILS # BLD AUTO: 1.6 K/UL (ref 1.8–7.7)
NEUTROPHILS NFR BLD: 56 % (ref 38–73)
NRBC BLD-RTO: 0 /100 WBC
PLATELET # BLD AUTO: 70 K/UL (ref 150–450)
PMV BLD AUTO: 11.7 FL (ref 9.2–12.9)
POTASSIUM SERPL-SCNC: 3.9 MMOL/L (ref 3.5–5.1)
PROT SERPL-MCNC: 6.4 G/DL (ref 6–8.4)
PROTHROMBIN TIME: 11.4 SEC (ref 9–12.5)
RBC # BLD AUTO: 3.96 M/UL (ref 4–5.4)
SODIUM SERPL-SCNC: 140 MMOL/L (ref 136–145)
WBC # BLD AUTO: 2.91 K/UL (ref 3.9–12.7)

## 2022-11-12 PROCEDURE — 80197 ASSAY OF TACROLIMUS: CPT | Performed by: INTERNAL MEDICINE

## 2022-11-12 PROCEDURE — 85610 PROTHROMBIN TIME: CPT | Mod: PO | Performed by: INTERNAL MEDICINE

## 2022-11-12 PROCEDURE — 36415 COLL VENOUS BLD VENIPUNCTURE: CPT | Mod: PO | Performed by: INTERNAL MEDICINE

## 2022-11-12 PROCEDURE — 85025 COMPLETE CBC W/AUTO DIFF WBC: CPT | Performed by: INTERNAL MEDICINE

## 2022-11-12 PROCEDURE — 80053 COMPREHEN METABOLIC PANEL: CPT | Performed by: INTERNAL MEDICINE

## 2022-11-12 PROCEDURE — 82105 ALPHA-FETOPROTEIN SERUM: CPT | Performed by: INTERNAL MEDICINE

## 2022-11-13 LAB — TACROLIMUS BLD-MCNC: 11 NG/ML (ref 5–15)

## 2022-11-14 ENCOUNTER — PATIENT MESSAGE (OUTPATIENT)
Dept: TRANSPLANT | Facility: CLINIC | Age: 52
End: 2022-11-14
Payer: MEDICARE

## 2022-11-14 DIAGNOSIS — Z94.4 LIVER REPLACED BY TRANSPLANT: ICD-10-CM

## 2022-11-14 NOTE — TELEPHONE ENCOUNTER
Sent message to let patient know of the change and labs on 12/10/22  ----- Message from Jaya Nielsen MD sent at 11/14/2022 12:04 PM CST -----  Results reviewed. Please reduce tac to 0.5/0.5

## 2022-11-15 RX ORDER — TACROLIMUS 0.5 MG/1
0.5 CAPSULE ORAL EVERY 12 HOURS
Qty: 180 CAPSULE | Refills: 3 | Status: SHIPPED | OUTPATIENT
Start: 2022-11-15 | End: 2023-05-23

## 2022-11-21 ENCOUNTER — TELEPHONE (OUTPATIENT)
Dept: TRANSPLANT | Facility: CLINIC | Age: 52
End: 2022-11-21
Payer: MEDICARE

## 2022-11-21 NOTE — TELEPHONE ENCOUNTER
----- Message from Maude Chau RN sent at 11/21/2022  3:47 PM CST -----  Contact: pt    ----- Message -----  From: Rocio Cortez MA  Sent: 11/21/2022   3:45 PM CST  To: [facility]Liver Transplant [facility]      ----- Message -----  From: Ai Elizabeth  Sent: 11/21/2022   3:25 PM CST  To: [facility] [place] Staff    Type: Needs Medical Advice  Who Called:  pt   Best Call Back Number: [phone]    Additional Information: pt would like to her ultrasound rescheduled for 01/03 for 3:30pm she states she couldn't make it today because she did not fast. Please advise.

## 2022-12-01 ENCOUNTER — PATIENT MESSAGE (OUTPATIENT)
Dept: FAMILY MEDICINE | Facility: CLINIC | Age: 52
End: 2022-12-01
Payer: MEDICARE

## 2022-12-10 ENCOUNTER — LAB VISIT (OUTPATIENT)
Dept: LAB | Facility: HOSPITAL | Age: 52
End: 2022-12-10
Attending: INTERNAL MEDICINE
Payer: MEDICARE

## 2022-12-10 DIAGNOSIS — C22.0 HEPATOCELLULAR CARCINOMA: ICD-10-CM

## 2022-12-10 DIAGNOSIS — Z94.4 LIVER REPLACED BY TRANSPLANT: ICD-10-CM

## 2022-12-10 LAB
AFP SERPL-MCNC: 2.5 NG/ML (ref 0–8.4)
ALBUMIN SERPL BCP-MCNC: 2.8 G/DL (ref 3.5–5.2)
ALP SERPL-CCNC: 450 U/L (ref 55–135)
ALT SERPL W/O P-5'-P-CCNC: 54 U/L (ref 10–44)
ANION GAP SERPL CALC-SCNC: 7 MMOL/L (ref 8–16)
AST SERPL-CCNC: 79 U/L (ref 10–40)
BASOPHILS # BLD AUTO: 0.03 K/UL (ref 0–0.2)
BASOPHILS NFR BLD: 1 % (ref 0–1.9)
BILIRUB SERPL-MCNC: 1.7 MG/DL (ref 0.1–1)
BUN SERPL-MCNC: 20 MG/DL (ref 6–20)
CALCIUM SERPL-MCNC: 9.2 MG/DL (ref 8.7–10.5)
CHLORIDE SERPL-SCNC: 105 MMOL/L (ref 95–110)
CO2 SERPL-SCNC: 25 MMOL/L (ref 23–29)
CREAT SERPL-MCNC: 0.8 MG/DL (ref 0.5–1.4)
DIFFERENTIAL METHOD: ABNORMAL
EOSINOPHIL # BLD AUTO: 0.3 K/UL (ref 0–0.5)
EOSINOPHIL NFR BLD: 8.8 % (ref 0–8)
ERYTHROCYTE [DISTWIDTH] IN BLOOD BY AUTOMATED COUNT: 14.6 % (ref 11.5–14.5)
EST. GFR  (NO RACE VARIABLE): >60 ML/MIN/1.73 M^2
GLUCOSE SERPL-MCNC: 98 MG/DL (ref 70–110)
HCT VFR BLD AUTO: 37.2 % (ref 37–48.5)
HGB BLD-MCNC: 12.2 G/DL (ref 12–16)
IMM GRANULOCYTES # BLD AUTO: 0 K/UL (ref 0–0.04)
IMM GRANULOCYTES NFR BLD AUTO: 0 % (ref 0–0.5)
INR PPP: 1.1 (ref 0.8–1.2)
LYMPHOCYTES # BLD AUTO: 0.8 K/UL (ref 1–4.8)
LYMPHOCYTES NFR BLD: 26.4 % (ref 18–48)
MCH RBC QN AUTO: 31 PG (ref 27–31)
MCHC RBC AUTO-ENTMCNC: 32.8 G/DL (ref 32–36)
MCV RBC AUTO: 94 FL (ref 82–98)
MONOCYTES # BLD AUTO: 0.3 K/UL (ref 0.3–1)
MONOCYTES NFR BLD: 10.1 % (ref 4–15)
NEUTROPHILS # BLD AUTO: 1.6 K/UL (ref 1.8–7.7)
NEUTROPHILS NFR BLD: 53.7 % (ref 38–73)
NRBC BLD-RTO: 0 /100 WBC
PLATELET # BLD AUTO: 72 K/UL (ref 150–450)
PMV BLD AUTO: 12.1 FL (ref 9.2–12.9)
POTASSIUM SERPL-SCNC: 3.3 MMOL/L (ref 3.5–5.1)
PROT SERPL-MCNC: 6.8 G/DL (ref 6–8.4)
PROTHROMBIN TIME: 11.4 SEC (ref 9–12.5)
RBC # BLD AUTO: 3.94 M/UL (ref 4–5.4)
SODIUM SERPL-SCNC: 137 MMOL/L (ref 136–145)
WBC # BLD AUTO: 2.96 K/UL (ref 3.9–12.7)

## 2022-12-10 PROCEDURE — 36415 COLL VENOUS BLD VENIPUNCTURE: CPT | Mod: PO | Performed by: INTERNAL MEDICINE

## 2022-12-10 PROCEDURE — 85025 COMPLETE CBC W/AUTO DIFF WBC: CPT | Performed by: INTERNAL MEDICINE

## 2022-12-10 PROCEDURE — 82105 ALPHA-FETOPROTEIN SERUM: CPT | Performed by: INTERNAL MEDICINE

## 2022-12-10 PROCEDURE — 85610 PROTHROMBIN TIME: CPT | Mod: PO | Performed by: INTERNAL MEDICINE

## 2022-12-10 PROCEDURE — 80197 ASSAY OF TACROLIMUS: CPT | Performed by: INTERNAL MEDICINE

## 2022-12-10 PROCEDURE — 80053 COMPREHEN METABOLIC PANEL: CPT | Performed by: INTERNAL MEDICINE

## 2022-12-11 LAB — TACROLIMUS BLD-MCNC: 5.5 NG/ML (ref 5–15)

## 2022-12-12 ENCOUNTER — TELEPHONE (OUTPATIENT)
Dept: TRANSPLANT | Facility: CLINIC | Age: 52
End: 2022-12-12
Payer: MEDICARE

## 2022-12-12 ENCOUNTER — PATIENT MESSAGE (OUTPATIENT)
Dept: TRANSPLANT | Facility: CLINIC | Age: 52
End: 2022-12-12
Payer: MEDICARE

## 2022-12-12 NOTE — TELEPHONE ENCOUNTER
Sent patient message via portal to let her know labs are stable.  No medication changes, next labs due on 03/04/23      ----- Message from Jaya Nielsen MD sent at 12/12/2022  8:23 AM CST -----  Results reviewed. No action.

## 2022-12-27 ENCOUNTER — PATIENT OUTREACH (OUTPATIENT)
Dept: ADMINISTRATIVE | Facility: HOSPITAL | Age: 52
End: 2022-12-27
Payer: MEDICARE

## 2023-01-03 ENCOUNTER — HOSPITAL ENCOUNTER (OUTPATIENT)
Dept: RADIOLOGY | Facility: HOSPITAL | Age: 53
Discharge: HOME | End: 2023-01-03
Attending: INTERNAL MEDICINE
Payer: MEDICARE

## 2023-01-03 DIAGNOSIS — C22.0 HEPATOCELLULAR CARCINOMA: ICD-10-CM

## 2023-01-03 PROCEDURE — 93976 US DOPPLER LIVER TRANSPLANT POST (XPD): ICD-10-PCS | Mod: 26,,, | Performed by: RADIOLOGY

## 2023-01-03 PROCEDURE — 76705 ECHO EXAM OF ABDOMEN: CPT | Mod: 26,XS,, | Performed by: RADIOLOGY

## 2023-01-03 PROCEDURE — 76705 US DOPPLER LIVER TRANSPLANT POST (XPD): ICD-10-PCS | Mod: 26,XS,, | Performed by: RADIOLOGY

## 2023-01-03 PROCEDURE — 93976 VASCULAR STUDY: CPT | Mod: TC,PO

## 2023-01-03 PROCEDURE — 93976 VASCULAR STUDY: CPT | Mod: 26,,, | Performed by: RADIOLOGY

## 2023-01-04 ENCOUNTER — PATIENT MESSAGE (OUTPATIENT)
Dept: TRANSPLANT | Facility: CLINIC | Age: 53
End: 2023-01-04
Payer: MEDICARE

## 2023-01-04 ENCOUNTER — TELEPHONE (OUTPATIENT)
Dept: TRANSPLANT | Facility: CLINIC | Age: 53
End: 2023-01-04
Payer: MEDICARE

## 2023-01-04 NOTE — TELEPHONE ENCOUNTER
Sent message to let patient know US stable    ----- Message from Jaya Nielsen MD sent at 1/4/2023 10:32 AM CST -----  Results reviewed. No action.

## 2023-02-13 ENCOUNTER — TELEPHONE (OUTPATIENT)
Dept: FAMILY MEDICINE | Facility: CLINIC | Age: 53
End: 2023-02-13
Payer: MEDICARE

## 2023-02-13 ENCOUNTER — PATIENT OUTREACH (OUTPATIENT)
Dept: ADMINISTRATIVE | Facility: HOSPITAL | Age: 53
End: 2023-02-13
Payer: MEDICARE

## 2023-02-14 ENCOUNTER — OFFICE VISIT (OUTPATIENT)
Dept: FAMILY MEDICINE | Facility: CLINIC | Age: 53
End: 2023-02-14
Payer: MEDICARE

## 2023-02-14 ENCOUNTER — LAB VISIT (OUTPATIENT)
Dept: LAB | Facility: HOSPITAL | Age: 53
End: 2023-02-14
Attending: INTERNAL MEDICINE
Payer: MEDICARE

## 2023-02-14 VITALS
BODY MASS INDEX: 24 KG/M2 | HEART RATE: 77 BPM | SYSTOLIC BLOOD PRESSURE: 124 MMHG | DIASTOLIC BLOOD PRESSURE: 64 MMHG | OXYGEN SATURATION: 97 % | WEIGHT: 119.06 LBS | HEIGHT: 59 IN

## 2023-02-14 DIAGNOSIS — E87.6 HYPOKALEMIA: ICD-10-CM

## 2023-02-14 DIAGNOSIS — E03.4 HYPOTHYROIDISM DUE TO ACQUIRED ATROPHY OF THYROID: Chronic | ICD-10-CM

## 2023-02-14 DIAGNOSIS — J01.40 ACUTE NON-RECURRENT PANSINUSITIS: ICD-10-CM

## 2023-02-14 DIAGNOSIS — E22.2 SIADH (SYNDROME OF INAPPROPRIATE ADH PRODUCTION): Chronic | ICD-10-CM

## 2023-02-14 DIAGNOSIS — F41.9 ANXIETY: ICD-10-CM

## 2023-02-14 DIAGNOSIS — I70.0 AORTIC ATHEROSCLEROSIS: ICD-10-CM

## 2023-02-14 DIAGNOSIS — G47.01 INSOMNIA DUE TO MEDICAL CONDITION: ICD-10-CM

## 2023-02-14 DIAGNOSIS — I10 ESSENTIAL HYPERTENSION: ICD-10-CM

## 2023-02-14 DIAGNOSIS — R25.1 TREMOR OF BOTH HANDS: ICD-10-CM

## 2023-02-14 DIAGNOSIS — R41.0 CONFUSION: ICD-10-CM

## 2023-02-14 DIAGNOSIS — R51.9 FREQUENT HEADACHES: ICD-10-CM

## 2023-02-14 DIAGNOSIS — G62.0 DRUG-INDUCED PERIPHERAL NEUROPATHY: ICD-10-CM

## 2023-02-14 DIAGNOSIS — R59.1 LYMPHADENOPATHY OF HEAD AND NECK: ICD-10-CM

## 2023-02-14 DIAGNOSIS — Z94.4 S/P LIVER TRANSPLANT: Chronic | ICD-10-CM

## 2023-02-14 DIAGNOSIS — K72.10 CHRONIC LIVER FAILURE WITHOUT HEPATIC COMA: ICD-10-CM

## 2023-02-14 DIAGNOSIS — Z12.11 COLON CANCER SCREENING: ICD-10-CM

## 2023-02-14 DIAGNOSIS — K74.60 CIRRHOSIS OF LIVER WITH ASCITES, UNSPECIFIED HEPATIC CIRRHOSIS TYPE: ICD-10-CM

## 2023-02-14 DIAGNOSIS — Z87.898 HISTORY OF SEIZURES: ICD-10-CM

## 2023-02-14 DIAGNOSIS — F33.9 DEPRESSION, RECURRENT: ICD-10-CM

## 2023-02-14 DIAGNOSIS — R41.0 CONFUSION: Primary | ICD-10-CM

## 2023-02-14 DIAGNOSIS — R18.8 CIRRHOSIS OF LIVER WITH ASCITES, UNSPECIFIED HEPATIC CIRRHOSIS TYPE: ICD-10-CM

## 2023-02-14 PROBLEM — D69.6 THROMBOCYTOPENIA, UNSPECIFIED: Status: RESOLVED | Noted: 2021-11-09 | Resolved: 2023-02-14

## 2023-02-14 PROBLEM — E43 SEVERE PROTEIN-CALORIE MALNUTRITION: Status: RESOLVED | Noted: 2020-10-15 | Resolved: 2023-02-14

## 2023-02-14 PROBLEM — E87.0 HYPERNATREMIA: Status: RESOLVED | Noted: 2020-09-10 | Resolved: 2023-02-14

## 2023-02-14 LAB
ALBUMIN SERPL BCP-MCNC: 2.7 G/DL (ref 3.5–5.2)
ALP SERPL-CCNC: 459 U/L (ref 55–135)
ALT SERPL W/O P-5'-P-CCNC: 53 U/L (ref 10–44)
ANION GAP SERPL CALC-SCNC: 9 MMOL/L (ref 8–16)
AST SERPL-CCNC: 73 U/L (ref 10–40)
BILIRUB SERPL-MCNC: 1.6 MG/DL (ref 0.1–1)
BUN SERPL-MCNC: 24 MG/DL (ref 6–20)
CALCIUM SERPL-MCNC: 9.1 MG/DL (ref 8.7–10.5)
CHLORIDE SERPL-SCNC: 104 MMOL/L (ref 95–110)
CO2 SERPL-SCNC: 28 MMOL/L (ref 23–29)
CREAT SERPL-MCNC: 1 MG/DL (ref 0.5–1.4)
EST. GFR  (NO RACE VARIABLE): >60 ML/MIN/1.73 M^2
GLUCOSE SERPL-MCNC: 100 MG/DL (ref 70–110)
MAGNESIUM SERPL-MCNC: 1.6 MG/DL (ref 1.6–2.6)
POTASSIUM SERPL-SCNC: 3.7 MMOL/L (ref 3.5–5.1)
PROT SERPL-MCNC: 6.1 G/DL (ref 6–8.4)
SODIUM SERPL-SCNC: 141 MMOL/L (ref 136–145)
T4 FREE SERPL-MCNC: 0.94 NG/DL (ref 0.71–1.51)
TSH SERPL DL<=0.005 MIU/L-ACNC: 1.23 UIU/ML (ref 0.4–4)

## 2023-02-14 PROCEDURE — 3078F DIAST BP <80 MM HG: CPT | Mod: CPTII,S$GLB,, | Performed by: INTERNAL MEDICINE

## 2023-02-14 PROCEDURE — G0009 PNEUMOCOCCAL POLYSACCHARIDE VACCINE 23-VALENT =>2YO SQ IM: ICD-10-PCS | Mod: S$GLB,,, | Performed by: INTERNAL MEDICINE

## 2023-02-14 PROCEDURE — 3074F SYST BP LT 130 MM HG: CPT | Mod: CPTII,S$GLB,, | Performed by: INTERNAL MEDICINE

## 2023-02-14 PROCEDURE — 3074F PR MOST RECENT SYSTOLIC BLOOD PRESSURE < 130 MM HG: ICD-10-PCS | Mod: CPTII,S$GLB,, | Performed by: INTERNAL MEDICINE

## 2023-02-14 PROCEDURE — 83735 ASSAY OF MAGNESIUM: CPT | Performed by: INTERNAL MEDICINE

## 2023-02-14 PROCEDURE — 99999 PR PBB SHADOW E&M-EST. PATIENT-LVL IV: ICD-10-PCS | Mod: PBBFAC,,, | Performed by: INTERNAL MEDICINE

## 2023-02-14 PROCEDURE — 3008F PR BODY MASS INDEX (BMI) DOCUMENTED: ICD-10-PCS | Mod: CPTII,S$GLB,, | Performed by: INTERNAL MEDICINE

## 2023-02-14 PROCEDURE — 90732 PPSV23 VACC 2 YRS+ SUBQ/IM: CPT | Mod: S$GLB,,, | Performed by: INTERNAL MEDICINE

## 2023-02-14 PROCEDURE — 36415 COLL VENOUS BLD VENIPUNCTURE: CPT | Mod: PO | Performed by: INTERNAL MEDICINE

## 2023-02-14 PROCEDURE — 85025 COMPLETE CBC W/AUTO DIFF WBC: CPT | Performed by: INTERNAL MEDICINE

## 2023-02-14 PROCEDURE — 99214 OFFICE O/P EST MOD 30 MIN: CPT | Mod: 25,S$GLB,, | Performed by: INTERNAL MEDICINE

## 2023-02-14 PROCEDURE — 90732 PNEUMOCOCCAL POLYSACCHARIDE VACCINE 23-VALENT =>2YO SQ IM: ICD-10-PCS | Mod: S$GLB,,, | Performed by: INTERNAL MEDICINE

## 2023-02-14 PROCEDURE — 99999 PR PBB SHADOW E&M-EST. PATIENT-LVL IV: CPT | Mod: PBBFAC,,, | Performed by: INTERNAL MEDICINE

## 2023-02-14 PROCEDURE — 80053 COMPREHEN METABOLIC PANEL: CPT | Performed by: INTERNAL MEDICINE

## 2023-02-14 PROCEDURE — 4010F ACE/ARB THERAPY RXD/TAKEN: CPT | Mod: CPTII,S$GLB,, | Performed by: INTERNAL MEDICINE

## 2023-02-14 PROCEDURE — 3078F PR MOST RECENT DIASTOLIC BLOOD PRESSURE < 80 MM HG: ICD-10-PCS | Mod: CPTII,S$GLB,, | Performed by: INTERNAL MEDICINE

## 2023-02-14 PROCEDURE — 1160F RVW MEDS BY RX/DR IN RCRD: CPT | Mod: CPTII,S$GLB,, | Performed by: INTERNAL MEDICINE

## 2023-02-14 PROCEDURE — 4010F PR ACE/ARB THEARPY RXD/TAKEN: ICD-10-PCS | Mod: CPTII,S$GLB,, | Performed by: INTERNAL MEDICINE

## 2023-02-14 PROCEDURE — 1160F PR REVIEW ALL MEDS BY PRESCRIBER/CLIN PHARMACIST DOCUMENTED: ICD-10-PCS | Mod: CPTII,S$GLB,, | Performed by: INTERNAL MEDICINE

## 2023-02-14 PROCEDURE — 99214 PR OFFICE/OUTPT VISIT, EST, LEVL IV, 30-39 MIN: ICD-10-PCS | Mod: 25,S$GLB,, | Performed by: INTERNAL MEDICINE

## 2023-02-14 PROCEDURE — 82140 ASSAY OF AMMONIA: CPT | Performed by: INTERNAL MEDICINE

## 2023-02-14 PROCEDURE — 1159F MED LIST DOCD IN RCRD: CPT | Mod: CPTII,S$GLB,, | Performed by: INTERNAL MEDICINE

## 2023-02-14 PROCEDURE — G0009 ADMIN PNEUMOCOCCAL VACCINE: HCPCS | Mod: S$GLB,,, | Performed by: INTERNAL MEDICINE

## 2023-02-14 PROCEDURE — 1159F PR MEDICATION LIST DOCUMENTED IN MEDICAL RECORD: ICD-10-PCS | Mod: CPTII,S$GLB,, | Performed by: INTERNAL MEDICINE

## 2023-02-14 PROCEDURE — 84439 ASSAY OF FREE THYROXINE: CPT | Performed by: INTERNAL MEDICINE

## 2023-02-14 PROCEDURE — 3008F BODY MASS INDEX DOCD: CPT | Mod: CPTII,S$GLB,, | Performed by: INTERNAL MEDICINE

## 2023-02-14 PROCEDURE — 84443 ASSAY THYROID STIM HORMONE: CPT | Performed by: INTERNAL MEDICINE

## 2023-02-14 RX ORDER — ZOSTER VACCINE RECOMBINANT, ADJUVANTED 50 MCG/0.5
KIT INTRAMUSCULAR
COMMUNITY
Start: 2022-11-12

## 2023-02-14 RX ORDER — BUTALBITAL AND ACETAMINOPHEN 325; 50 MG/1; MG/1
1 TABLET ORAL 2 TIMES DAILY PRN
Qty: 60 TABLET | Refills: 3 | Status: SHIPPED | OUTPATIENT
Start: 2023-02-14 | End: 2023-03-16

## 2023-02-14 RX ORDER — CLONAZEPAM 0.5 MG/1
0.5 TABLET ORAL 2 TIMES DAILY PRN
Qty: 60 TABLET | Refills: 3 | Status: ON HOLD | OUTPATIENT
Start: 2023-02-14 | End: 2023-03-06 | Stop reason: HOSPADM

## 2023-02-14 RX ORDER — LANOLIN ALCOHOL/MO/W.PET/CERES
CREAM (GRAM) TOPICAL
Qty: 180 TABLET | Refills: 3 | Status: SHIPPED | OUTPATIENT
Start: 2023-02-14 | End: 2023-08-11 | Stop reason: SDUPTHER

## 2023-02-14 RX ORDER — FLUOROMETHOLONE 1 MG/ML
2 SUSPENSION/ DROPS OPHTHALMIC 2 TIMES DAILY
COMMUNITY

## 2023-02-14 RX ORDER — INFLUENZA A VIRUS A/DELAWARE/55/2019 CVR-45 (H1N1) ANTIGEN (MDCK CELL DERIVED, PROPIOLACTONE INACTIVATED), INFLUENZA A VIRUS A/DARWIN/11/2021 (H3N2) ANTIGEN (MDCK CELL DERIVED, PROPIOLACTONE INACTIVATED), INFLUENZA B VIRUS B/SINGAPORE/WUH4618/2021 ANTIGEN (MDCK CELL DERIVED, PROPIOLACTONE INACTIVATED), INFLUENZA B VIRUS B/SINGAPORE/INFTT-16-0610/2016 ANTIGEN (MDCK CELL DERIVED, PROPIOLACTONE INACTIVATED) 15; 15; 15; 15 UG/.5ML; UG/.5ML; UG/.5ML; UG/.5ML
INJECTION, SUSPENSION INTRAMUSCULAR
Status: ON HOLD | COMMUNITY
Start: 2022-11-12 | End: 2023-03-02 | Stop reason: CLARIF

## 2023-02-14 RX ORDER — AMOXICILLIN AND CLAVULANATE POTASSIUM 875; 125 MG/1; MG/1
1 TABLET, FILM COATED ORAL EVERY 12 HOURS
Qty: 14 TABLET | Refills: 0 | Status: SHIPPED | OUTPATIENT
Start: 2023-02-14 | End: 2023-02-21
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
## 2023-02-14 NOTE — LETTER
February 14, 2023          No Recipients             [facility]  [address]  [place] [address]  Phone: [phone]  Fax: [phone]   Patient: Elda Hernandez   MR Number: [id number]   YOB: 1970   Date of Visit: 2/14/2023       To whom it may concern,    Ms. Elda Hernandez is a patient of mine. Please excuse her from jury duty as she has short term memory loss. As such, she would not make a good juror. Please call with any questions.       Sincerely,      David Lorenzo MD

## 2023-02-15 ENCOUNTER — TELEPHONE (OUTPATIENT)
Dept: NEUROLOGY | Facility: CLINIC | Age: 53
End: 2023-02-15
Payer: MEDICARE

## 2023-02-15 DIAGNOSIS — E72.20 HYPERAMMONEMIA: Primary | ICD-10-CM

## 2023-02-15 LAB
AMMONIA PLAS-SCNC: 138 UMOL/L (ref 10–50)
BASOPHILS # BLD AUTO: 0.06 K/UL (ref 0–0.2)
BASOPHILS NFR BLD: 1.6 % (ref 0–1.9)
DIFFERENTIAL METHOD: ABNORMAL
EOSINOPHIL # BLD AUTO: 0.5 K/UL (ref 0–0.5)
EOSINOPHIL NFR BLD: 12.3 % (ref 0–8)
ERYTHROCYTE [DISTWIDTH] IN BLOOD BY AUTOMATED COUNT: 14.8 % (ref 11.5–14.5)
HCT VFR BLD AUTO: 34.6 % (ref 37–48.5)
HGB BLD-MCNC: 11.2 G/DL (ref 12–16)
IMM GRANULOCYTES # BLD AUTO: 0 K/UL (ref 0–0.04)
IMM GRANULOCYTES NFR BLD AUTO: 0 % (ref 0–0.5)
LYMPHOCYTES # BLD AUTO: 0.7 K/UL (ref 1–4.8)
LYMPHOCYTES NFR BLD: 19.1 % (ref 18–48)
MCH RBC QN AUTO: 30.4 PG (ref 27–31)
MCHC RBC AUTO-ENTMCNC: 32.4 G/DL (ref 32–36)
MCV RBC AUTO: 94 FL (ref 82–98)
MONOCYTES # BLD AUTO: 0.5 K/UL (ref 0.3–1)
MONOCYTES NFR BLD: 13.4 % (ref 4–15)
NEUTROPHILS # BLD AUTO: 2 K/UL (ref 1.8–7.7)
NEUTROPHILS NFR BLD: 53.6 % (ref 38–73)
NRBC BLD-RTO: 0 /100 WBC
PLATELET # BLD AUTO: 75 K/UL (ref 150–450)
PMV BLD AUTO: 13.3 FL (ref 9.2–12.9)
RBC # BLD AUTO: 3.68 M/UL (ref 4–5.4)
WBC # BLD AUTO: 3.66 K/UL (ref 3.9–12.7)

## 2023-02-15 RX ORDER — LACTULOSE 10 G/15ML
20 SOLUTION ORAL; RECTAL 2 TIMES DAILY
Qty: 473 ML | Refills: 0 | Status: ON HOLD | OUTPATIENT
Start: 2023-02-15 | End: 2023-03-06 | Stop reason: SDUPTHER

## 2023-02-16 ENCOUNTER — PATIENT MESSAGE (OUTPATIENT)
Dept: TRANSPLANT | Facility: CLINIC | Age: 53
End: 2023-02-16
Payer: MEDICARE

## 2023-02-16 ENCOUNTER — TELEPHONE (OUTPATIENT)
Dept: NEUROLOGY | Facility: CLINIC | Age: 53
End: 2023-02-16
Payer: MEDICARE

## 2023-02-16 ENCOUNTER — PATIENT MESSAGE (OUTPATIENT)
Dept: FAMILY MEDICINE | Facility: CLINIC | Age: 53
End: 2023-02-16
Payer: MEDICARE

## 2023-02-17 ENCOUNTER — PATIENT MESSAGE (OUTPATIENT)
Dept: FAMILY MEDICINE | Facility: CLINIC | Age: 53
End: 2023-02-17
Payer: MEDICARE

## 2023-02-17 ENCOUNTER — TELEPHONE (OUTPATIENT)
Dept: FAMILY MEDICINE | Facility: CLINIC | Age: 53
End: 2023-02-17
Payer: MEDICARE

## 2023-02-17 DIAGNOSIS — E72.20 HYPERAMMONEMIA: Primary | ICD-10-CM

## 2023-02-27 ENCOUNTER — PATIENT MESSAGE (OUTPATIENT)
Dept: TRANSPLANT | Facility: CLINIC | Age: 53
End: 2023-02-27
Payer: MEDICARE

## 2023-02-28 ENCOUNTER — HOSPITAL ENCOUNTER (INPATIENT)
Facility: HOSPITAL | Age: 53
LOS: 6 days | Discharge: LEFT BEFORE DISCHARGE | DRG: 442 | End: 2023-03-06
Attending: EMERGENCY MEDICINE | Admitting: HOSPITALIST
Payer: MEDICARE

## 2023-02-28 ENCOUNTER — PATIENT MESSAGE (OUTPATIENT)
Dept: FAMILY MEDICINE | Facility: CLINIC | Age: 53
End: 2023-02-28
Payer: MEDICARE

## 2023-02-28 ENCOUNTER — TELEPHONE (OUTPATIENT)
Dept: TRANSPLANT | Facility: CLINIC | Age: 53
End: 2023-02-28
Payer: MEDICARE

## 2023-02-28 ENCOUNTER — NURSE TRIAGE (OUTPATIENT)
Dept: ADMINISTRATIVE | Facility: CLINIC | Age: 53
End: 2023-02-28
Payer: MEDICARE

## 2023-02-28 DIAGNOSIS — R41.82 AMS (ALTERED MENTAL STATUS): ICD-10-CM

## 2023-02-28 DIAGNOSIS — R18.8 CIRRHOSIS OF LIVER WITH ASCITES, UNSPECIFIED HEPATIC CIRRHOSIS TYPE: ICD-10-CM

## 2023-02-28 DIAGNOSIS — E72.20 HYPERAMMONEMIA: ICD-10-CM

## 2023-02-28 DIAGNOSIS — R07.9 CHEST PAIN: ICD-10-CM

## 2023-02-28 DIAGNOSIS — E87.1 HYPONATREMIA: ICD-10-CM

## 2023-02-28 DIAGNOSIS — K74.60 CIRRHOSIS OF LIVER WITH ASCITES, UNSPECIFIED HEPATIC CIRRHOSIS TYPE: ICD-10-CM

## 2023-02-28 DIAGNOSIS — R41.0 CONFUSION: ICD-10-CM

## 2023-02-28 DIAGNOSIS — K76.82 HEPATIC ENCEPHALOPATHY: Primary | ICD-10-CM

## 2023-02-28 DIAGNOSIS — K74.69 DECOMPENSATED LIVER DISEASE: ICD-10-CM

## 2023-02-28 PROBLEM — K72.90 DECOMPENSATED LIVER DISEASE: Status: ACTIVE | Noted: 2021-11-09

## 2023-02-28 LAB
ALBUMIN SERPL BCP-MCNC: 3.1 G/DL (ref 3.5–5.2)
ALP SERPL-CCNC: 582 U/L (ref 55–135)
ALT SERPL W/O P-5'-P-CCNC: 52 U/L (ref 10–44)
AMMONIA PLAS-SCNC: 56 UMOL/L (ref 10–50)
ANION GAP SERPL CALC-SCNC: 11 MMOL/L (ref 8–16)
AST SERPL-CCNC: 77 U/L (ref 10–40)
BASOPHILS # BLD AUTO: 0.06 K/UL (ref 0–0.2)
BASOPHILS NFR BLD: 1.9 % (ref 0–1.9)
BILIRUB SERPL-MCNC: 2.3 MG/DL (ref 0.1–1)
BILIRUB UR QL STRIP: NEGATIVE
BUN SERPL-MCNC: 14 MG/DL (ref 6–20)
BUN SERPL-MCNC: 14 MG/DL (ref 6–30)
CALCIUM SERPL-MCNC: 8.9 MG/DL (ref 8.7–10.5)
CHLORIDE SERPL-SCNC: 100 MMOL/L (ref 95–110)
CHLORIDE SERPL-SCNC: 104 MMOL/L (ref 95–110)
CLARITY UR REFRACT.AUTO: CLEAR
CO2 SERPL-SCNC: 25 MMOL/L (ref 23–29)
COLOR UR AUTO: YELLOW
CREAT SERPL-MCNC: 0.9 MG/DL (ref 0.5–1.4)
CREAT SERPL-MCNC: 0.9 MG/DL (ref 0.5–1.4)
DIFFERENTIAL METHOD: ABNORMAL
EOSINOPHIL # BLD AUTO: 0.4 K/UL (ref 0–0.5)
EOSINOPHIL NFR BLD: 10.9 % (ref 0–8)
ERYTHROCYTE [DISTWIDTH] IN BLOOD BY AUTOMATED COUNT: 14.8 % (ref 11.5–14.5)
EST. GFR  (NO RACE VARIABLE): >60 ML/MIN/1.73 M^2
GLUCOSE SERPL-MCNC: 72 MG/DL (ref 70–110)
GLUCOSE SERPL-MCNC: 73 MG/DL (ref 70–110)
GLUCOSE UR QL STRIP: NEGATIVE
HCT VFR BLD AUTO: 36.6 % (ref 37–48.5)
HCT VFR BLD CALC: 32 %PCV (ref 36–54)
HCV AB SERPL QL IA: NORMAL
HGB BLD-MCNC: 12.3 G/DL (ref 12–16)
HGB UR QL STRIP: NEGATIVE
HIV 1+2 AB+HIV1 P24 AG SERPL QL IA: NORMAL
HYALINE CASTS UR QL AUTO: 5 /LPF
IMM GRANULOCYTES # BLD AUTO: 0.01 K/UL (ref 0–0.04)
IMM GRANULOCYTES NFR BLD AUTO: 0.3 % (ref 0–0.5)
INFLUENZA A, MOLECULAR: NOT DETECTED
INFLUENZA B, MOLECULAR: NOT DETECTED
INR PPP: 1.1 (ref 0.8–1.2)
KETONES UR QL STRIP: NEGATIVE
LACTATE SERPL-SCNC: 1 MMOL/L (ref 0.5–2.2)
LEUKOCYTE ESTERASE UR QL STRIP: ABNORMAL
LYMPHOCYTES # BLD AUTO: 0.9 K/UL (ref 1–4.8)
LYMPHOCYTES NFR BLD: 26.9 % (ref 18–48)
MAGNESIUM SERPL-MCNC: 1.5 MG/DL (ref 1.6–2.6)
MCH RBC QN AUTO: 30.4 PG (ref 27–31)
MCHC RBC AUTO-ENTMCNC: 33.6 G/DL (ref 32–36)
MCV RBC AUTO: 90 FL (ref 82–98)
MICROSCOPIC COMMENT: ABNORMAL
MONOCYTES # BLD AUTO: 0.4 K/UL (ref 0.3–1)
MONOCYTES NFR BLD: 10.9 % (ref 4–15)
NEUTROPHILS # BLD AUTO: 1.6 K/UL (ref 1.8–7.7)
NEUTROPHILS NFR BLD: 49.1 % (ref 38–73)
NITRITE UR QL STRIP: NEGATIVE
NON-SQ EPI CELLS #/AREA URNS AUTO: 0 /HPF
NRBC BLD-RTO: 0 /100 WBC
PH UR STRIP: 7 [PH] (ref 5–8)
PLATELET # BLD AUTO: 54 K/UL (ref 150–450)
PMV BLD AUTO: 11.8 FL (ref 9.2–12.9)
POC IONIZED CALCIUM: 1.16 MMOL/L (ref 1.06–1.42)
POC TCO2 (MEASURED): 26 MMOL/L (ref 23–29)
POTASSIUM BLD-SCNC: 3.2 MMOL/L (ref 3.5–5.1)
POTASSIUM SERPL-SCNC: 3.3 MMOL/L (ref 3.5–5.1)
PROT SERPL-MCNC: 7.1 G/DL (ref 6–8.4)
PROT UR QL STRIP: NEGATIVE
PROTHROMBIN TIME: 11.4 SEC (ref 9–12.5)
RBC # BLD AUTO: 4.05 M/UL (ref 4–5.4)
RBC #/AREA URNS AUTO: 3 /HPF (ref 0–4)
RSV AG BY MOLECULAR METHOD: NOT DETECTED
SAMPLE: ABNORMAL
SARS-COV-2 RNA RESP QL NAA+PROBE: NOT DETECTED
SODIUM BLD-SCNC: 140 MMOL/L (ref 136–145)
SODIUM SERPL-SCNC: 140 MMOL/L (ref 136–145)
SP GR UR STRIP: 1 (ref 1–1.03)
TSH SERPL DL<=0.005 MIU/L-ACNC: 2.58 UIU/ML (ref 0.4–4)
URN SPEC COLLECT METH UR: ABNORMAL
WBC # BLD AUTO: 3.2 K/UL (ref 3.9–12.7)
WBC #/AREA URNS AUTO: 10 /HPF (ref 0–5)

## 2023-02-28 PROCEDURE — 93005 ELECTROCARDIOGRAM TRACING: CPT

## 2023-02-28 PROCEDURE — 99285 PR EMERGENCY DEPT VISIT,LEVEL V: ICD-10-PCS | Mod: CS,,, | Performed by: EMERGENCY MEDICINE

## 2023-02-28 PROCEDURE — 93010 ELECTROCARDIOGRAM REPORT: CPT | Mod: ,,, | Performed by: INTERNAL MEDICINE

## 2023-02-28 PROCEDURE — 84443 ASSAY THYROID STIM HORMONE: CPT | Performed by: EMERGENCY MEDICINE

## 2023-02-28 PROCEDURE — 82330 ASSAY OF CALCIUM: CPT

## 2023-02-28 PROCEDURE — 63600175 PHARM REV CODE 636 W HCPCS: Performed by: STUDENT IN AN ORGANIZED HEALTH CARE EDUCATION/TRAINING PROGRAM

## 2023-02-28 PROCEDURE — 83605 ASSAY OF LACTIC ACID: CPT | Performed by: EMERGENCY MEDICINE

## 2023-02-28 PROCEDURE — 0241U SARS-COV2 (COVID) WITH FLU/RSV BY PCR: CPT | Performed by: STUDENT IN AN ORGANIZED HEALTH CARE EDUCATION/TRAINING PROGRAM

## 2023-02-28 PROCEDURE — 20600001 HC STEP DOWN PRIVATE ROOM

## 2023-02-28 PROCEDURE — 94761 N-INVAS EAR/PLS OXIMETRY MLT: CPT

## 2023-02-28 PROCEDURE — 25000003 PHARM REV CODE 250: Performed by: STUDENT IN AN ORGANIZED HEALTH CARE EDUCATION/TRAINING PROGRAM

## 2023-02-28 PROCEDURE — 99285 EMERGENCY DEPT VISIT HI MDM: CPT | Mod: 25

## 2023-02-28 PROCEDURE — 86803 HEPATITIS C AB TEST: CPT | Performed by: PHYSICIAN ASSISTANT

## 2023-02-28 PROCEDURE — 80047 BASIC METABLC PNL IONIZED CA: CPT

## 2023-02-28 PROCEDURE — 93010 EKG 12-LEAD: ICD-10-PCS | Mod: ,,, | Performed by: INTERNAL MEDICINE

## 2023-02-28 PROCEDURE — 99285 EMERGENCY DEPT VISIT HI MDM: CPT | Mod: CS,,, | Performed by: EMERGENCY MEDICINE

## 2023-02-28 PROCEDURE — 87040 BLOOD CULTURE FOR BACTERIA: CPT | Mod: 59 | Performed by: EMERGENCY MEDICINE

## 2023-02-28 PROCEDURE — 85025 COMPLETE CBC W/AUTO DIFF WBC: CPT | Performed by: EMERGENCY MEDICINE

## 2023-02-28 PROCEDURE — 81001 URINALYSIS AUTO W/SCOPE: CPT | Performed by: EMERGENCY MEDICINE

## 2023-02-28 PROCEDURE — 82140 ASSAY OF AMMONIA: CPT | Performed by: EMERGENCY MEDICINE

## 2023-02-28 PROCEDURE — 87389 HIV-1 AG W/HIV-1&-2 AB AG IA: CPT | Performed by: PHYSICIAN ASSISTANT

## 2023-02-28 PROCEDURE — 85610 PROTHROMBIN TIME: CPT | Performed by: EMERGENCY MEDICINE

## 2023-02-28 PROCEDURE — 83735 ASSAY OF MAGNESIUM: CPT | Performed by: EMERGENCY MEDICINE

## 2023-02-28 PROCEDURE — 80053 COMPREHEN METABOLIC PANEL: CPT | Performed by: EMERGENCY MEDICINE

## 2023-02-28 PROCEDURE — 25500020 PHARM REV CODE 255: Performed by: HOSPITALIST

## 2023-02-28 RX ORDER — ACETAMINOPHEN 325 MG/1
650 TABLET ORAL EVERY 8 HOURS PRN
Status: DISCONTINUED | OUTPATIENT
Start: 2023-02-28 | End: 2023-03-02

## 2023-02-28 RX ORDER — LEVOTHYROXINE SODIUM 75 UG/1
75 TABLET ORAL
Status: DISCONTINUED | OUTPATIENT
Start: 2023-03-01 | End: 2023-03-06 | Stop reason: HOSPADM

## 2023-02-28 RX ORDER — SIMETHICONE 80 MG
1 TABLET,CHEWABLE ORAL 4 TIMES DAILY PRN
Status: DISCONTINUED | OUTPATIENT
Start: 2023-02-28 | End: 2023-03-06 | Stop reason: HOSPADM

## 2023-02-28 RX ORDER — DICYCLOMINE HYDROCHLORIDE 10 MG/1
10 CAPSULE ORAL 4 TIMES DAILY PRN
Status: DISCONTINUED | OUTPATIENT
Start: 2023-03-01 | End: 2023-03-06 | Stop reason: HOSPADM

## 2023-02-28 RX ORDER — ACETAMINOPHEN 325 MG/1
650 TABLET ORAL EVERY 6 HOURS PRN
Status: DISCONTINUED | OUTPATIENT
Start: 2023-02-28 | End: 2023-02-28

## 2023-02-28 RX ORDER — HYDROMORPHONE HYDROCHLORIDE 1 MG/ML
0.5 INJECTION, SOLUTION INTRAMUSCULAR; INTRAVENOUS; SUBCUTANEOUS ONCE
Status: COMPLETED | OUTPATIENT
Start: 2023-02-28 | End: 2023-02-28

## 2023-02-28 RX ORDER — NALOXONE HCL 0.4 MG/ML
0.02 VIAL (ML) INJECTION
Status: DISCONTINUED | OUTPATIENT
Start: 2023-02-28 | End: 2023-03-06 | Stop reason: HOSPADM

## 2023-02-28 RX ORDER — TALC
6 POWDER (GRAM) TOPICAL NIGHTLY PRN
Status: DISCONTINUED | OUTPATIENT
Start: 2023-02-28 | End: 2023-03-06 | Stop reason: HOSPADM

## 2023-02-28 RX ORDER — ONDANSETRON 2 MG/ML
4 INJECTION INTRAMUSCULAR; INTRAVENOUS EVERY 8 HOURS PRN
Status: DISCONTINUED | OUTPATIENT
Start: 2023-02-28 | End: 2023-03-06 | Stop reason: HOSPADM

## 2023-02-28 RX ORDER — LACTULOSE 10 G/15ML
10 SOLUTION ORAL
Status: DISCONTINUED | OUTPATIENT
Start: 2023-02-28 | End: 2023-02-28

## 2023-02-28 RX ORDER — POTASSIUM CHLORIDE 20 MEQ/1
40 TABLET, EXTENDED RELEASE ORAL ONCE
Status: COMPLETED | OUTPATIENT
Start: 2023-02-28 | End: 2023-02-28

## 2023-02-28 RX ORDER — TACROLIMUS 0.5 MG/1
0.5 CAPSULE ORAL 2 TIMES DAILY
Status: DISCONTINUED | OUTPATIENT
Start: 2023-02-28 | End: 2023-03-06 | Stop reason: HOSPADM

## 2023-02-28 RX ORDER — LACTULOSE 10 G/15ML
20 SOLUTION ORAL 3 TIMES DAILY
Status: DISCONTINUED | OUTPATIENT
Start: 2023-02-28 | End: 2023-02-28

## 2023-02-28 RX ORDER — GLUCAGON 1 MG
1 KIT INJECTION
Status: DISCONTINUED | OUTPATIENT
Start: 2023-02-28 | End: 2023-03-06 | Stop reason: HOSPADM

## 2023-02-28 RX ORDER — SODIUM CHLORIDE 0.9 % (FLUSH) 0.9 %
10 SYRINGE (ML) INJECTION EVERY 12 HOURS PRN
Status: DISCONTINUED | OUTPATIENT
Start: 2023-02-28 | End: 2023-02-28

## 2023-02-28 RX ORDER — SODIUM CHLORIDE 0.9 % (FLUSH) 0.9 %
10 SYRINGE (ML) INJECTION
Status: DISCONTINUED | OUTPATIENT
Start: 2023-02-28 | End: 2023-03-06 | Stop reason: HOSPADM

## 2023-02-28 RX ORDER — IPRATROPIUM BROMIDE AND ALBUTEROL SULFATE 2.5; .5 MG/3ML; MG/3ML
3 SOLUTION RESPIRATORY (INHALATION) EVERY 4 HOURS PRN
Status: DISCONTINUED | OUTPATIENT
Start: 2023-02-28 | End: 2023-03-06 | Stop reason: HOSPADM

## 2023-02-28 RX ORDER — TALC
6 POWDER (GRAM) TOPICAL NIGHTLY PRN
Status: DISCONTINUED | OUTPATIENT
Start: 2023-02-28 | End: 2023-02-28

## 2023-02-28 RX ORDER — VENLAFAXINE HYDROCHLORIDE 150 MG/1
150 CAPSULE, EXTENDED RELEASE ORAL DAILY
Status: DISCONTINUED | OUTPATIENT
Start: 2023-03-01 | End: 2023-03-06 | Stop reason: HOSPADM

## 2023-02-28 RX ORDER — LACTULOSE 10 G/15ML
20 SOLUTION ORAL 3 TIMES DAILY
Status: DISCONTINUED | OUTPATIENT
Start: 2023-02-28 | End: 2023-03-03

## 2023-02-28 RX ORDER — LISINOPRIL 20 MG/1
20 TABLET ORAL DAILY
Status: DISCONTINUED | OUTPATIENT
Start: 2023-02-28 | End: 2023-03-03

## 2023-02-28 RX ORDER — PANTOPRAZOLE SODIUM 40 MG/1
40 TABLET, DELAYED RELEASE ORAL DAILY
Status: DISCONTINUED | OUTPATIENT
Start: 2023-03-01 | End: 2023-03-06 | Stop reason: HOSPADM

## 2023-02-28 RX ORDER — HYDROMORPHONE HYDROCHLORIDE 1 MG/ML
0.5 INJECTION, SOLUTION INTRAMUSCULAR; INTRAVENOUS; SUBCUTANEOUS ONCE
Status: COMPLETED | OUTPATIENT
Start: 2023-03-01 | End: 2023-03-01

## 2023-02-28 RX ORDER — PROMETHAZINE HYDROCHLORIDE 25 MG/1
25 TABLET ORAL EVERY 4 HOURS PRN
Status: DISCONTINUED | OUTPATIENT
Start: 2023-02-28 | End: 2023-03-06 | Stop reason: HOSPADM

## 2023-02-28 RX ORDER — FUROSEMIDE 10 MG/ML
40 INJECTION INTRAMUSCULAR; INTRAVENOUS DAILY
Status: DISCONTINUED | OUTPATIENT
Start: 2023-02-28 | End: 2023-03-01

## 2023-02-28 RX ORDER — PRAVASTATIN SODIUM 10 MG/1
10 TABLET ORAL NIGHTLY
Status: DISCONTINUED | OUTPATIENT
Start: 2023-02-28 | End: 2023-03-06 | Stop reason: HOSPADM

## 2023-02-28 RX ADMIN — POTASSIUM CHLORIDE 40 MEQ: 1500 TABLET, EXTENDED RELEASE ORAL at 09:02

## 2023-02-28 RX ADMIN — RIFAXIMIN 550 MG: 550 TABLET ORAL at 10:02

## 2023-02-28 RX ADMIN — LISINOPRIL 20 MG: 20 TABLET ORAL at 09:02

## 2023-02-28 RX ADMIN — PRAVASTATIN SODIUM 10 MG: 10 TABLET ORAL at 09:02

## 2023-02-28 RX ADMIN — IOHEXOL 75 ML: 350 INJECTION, SOLUTION INTRAVENOUS at 10:02

## 2023-02-28 RX ADMIN — HYDROMORPHONE HYDROCHLORIDE 0.5 MG: 1 INJECTION, SOLUTION INTRAMUSCULAR; INTRAVENOUS; SUBCUTANEOUS at 10:02

## 2023-02-28 RX ADMIN — FUROSEMIDE 40 MG: 10 INJECTION, SOLUTION INTRAMUSCULAR; INTRAVENOUS at 09:02

## 2023-02-28 RX ADMIN — TACROLIMUS 0.5 MG: 0.5 CAPSULE ORAL at 09:02

## 2023-02-28 RX ADMIN — LACTULOSE 20 G: 20 SOLUTION ORAL at 09:02

## 2023-02-28 NOTE — TELEPHONE ENCOUNTER
Patient's  states patient is s/p Liver Transplant in 2002. Patient's  states patient currently with Cirrhosis Stage 3 with an elevated ammonia level of 156.  states patient is hallucinating and exhibiting odd behavior at this time.     Care Advice given to Call EMS/911 for immediate medical attention. Patient's  states understanding of care advice.       Reason for Disposition   Seeing or hearing or feeling things that are not there (i.e., auditory, visual, or tactile hallucinations)    Additional Information   Negative: Difficult to awaken or acting confused (disoriented, slurred speech) and has diabetes mellitus   Negative: Difficult to awaken or acting confused (disoriented, slurred speech) and new-onset   Negative: Weakness of the face, arm, or leg on one side of the body and new-onset   Negative: Numbness of the face, arm, or leg on one side of the body and new-onset   Negative: Loss of speech or garbled speech and new-onset   Negative: Difficulty breathing and bluish (or gray) lips or face   Negative: Shock suspected (e.g., cold/pale/clammy skin, too weak to stand, low BP, rapid pulse)    Protocols used: Confusion - Delirium-A-OH

## 2023-02-28 NOTE — ED PROVIDER NOTES
Encounter Date: 2/28/2023       History     Chief Complaint   Patient presents with    Altered Mental Status     Ammonia level, lever transplant 2002, ams for few weeks, headache     HPI  Elda Andreea Hernandez is a 52-year-old female with a history of migraine disorder, hypertension, depression, Arnold-Chiari malformation, history of seizures, history of liver cirrhosis with liver fibrosis status post liver transferred presenting with altered mental status and headaches.  She is currently being treated for hepatic encephalopathy on lactulose with unknown last dose of medication.  She was seen at outside hospital on Saturday with ammonia levels greater than 150 today she had increasing confusion and disorientation.  She also endorses headache.   Much of the history is provided by family member at the bedside.  No reported fevers, chills, falls, trauma.     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    Arnold-Chiari malformation     Depression     Esophageal stricture     Essential tremor     Hypertension     Left bundle branch block     Liver fibrosis, transplanted liver 10/2/2018    Suggested on fibroscan 10/2/18    Migraine without aura     MVP (mitral valve prolapse)     Non-rheumatic mitral regurgitation 10/1/2018    Non-rheumatic tricuspid valve insufficiency 10/1/2018    Osteoporosis     Recurrent urinary tract infection     Seizures     Shingles 2007    SIADH (syndrome of inappropriate ADH production)     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    Tricuspid valve prolapse     Urolithiasis     Von Gierke disease     s/p liver transplant      Past Surgical History:   Procedure Laterality Date    APPENDECTOMY  6/22/2007    APPLICATION OF WOUND VACUUM-ASSISTED CLOSURE DEVICE N/A 9/18/2020    Procedure: APPLICATION, WOUND VAC;  Surgeon: Zain Decker MD;  Location: [address];  Service: General;  Laterality: N/A;    COLONOSCOPY  5/13/2008    internal hemorrhoids    CRANIOTOMY      ESOPHAGOGASTRODUODENOSCOPY N/A 9/3/2020    Procedure: EGD (ESOPHAGOGASTRODUODENOSCOPY);  Surgeon: Tyrel Vergara MD;  Location: [facility];  Service: Endoscopy;  Laterality: N/A;    EXPLORATORY LAPAROTOMY  2020    due to perforated stomach    LAMINECTOMY  3/2001    LIVER TRANSPLANT  9/23/2002    OSSICULAR RECONSTRUCTION  10/4/1995    RIGHT REPLACEMENT PROSTHESIS for cholesteatoma    THYMECTOMY  5/2/2007    TONSILLECTOMY, ADENOIDECTOMY  1/21/2004    TOTAL ABDOMINAL HYSTERECTOMY  3/31/1994     Family History   Problem Relation Age of Onset    Stroke Mother     Heart disease Mother     No Known Problems Father      Social History     Tobacco Use    Smoking status: Never    Smokeless tobacco: Never   Substance Use Topics    Alcohol use: No    Drug use: No     Review of Systems  All other systems reviewed and were negative; see HPI also for additional ROS.    Physical Exam     Initial Vitals [02/28/23 1712]   BP Pulse Resp Temp SpO2   139/63 63 18 97.9 °F (36.6 °C) 98 %      MAP       --         Physical Exam    Nursing note and vitals reviewed.    Gen/Constitutional: Interactive.  Confused, disoriented, chronically ill-appearing  Head: Normocephalic, Atraumatic  Neck: supple, no masses or LAD, no JVD  Eyes: PERRLA, conjunctiva clear  Ears, Nose and Throat: No rhinorrhea or stridor.  Cardiac:  Regular rate, Reg Rhythm, No murmur  Pulmonary: CTA Bilat, no wheezes, rhonchi, rales.  No increased work of breathing.  GI: Abdomen soft, non-tender, non-distended; no rebound or guarding  : No CVA tenderness.  Musculoskeletal: Extremities warm, well perfused, no erythema, no  edema  Skin: No rashes, cyanosis or jaundice.  Neuro: Alert and Oriented x 2 with periods of confusion and disorientation.  There is asterixis present.  No focal motor or sensory deficits.    Psych: Normal affect      ED Course   Procedures  Labs Reviewed   CBC W/ AUTO DIFFERENTIAL - Abnormal; Notable for the following components:       Result Value    WBC 3.20 (*)     Hematocrit 36.6 (*)     RDW 14.8 (*)     Platelets 54 (*)     Gran # (ANC) 1.6 (*)     Lymph # 0.9 (*)     Eosinophil % 10.9 (*)     All other components within normal limits   COMPREHENSIVE METABOLIC PANEL - Abnormal; Notable for the following components:    Potassium 3.3 (*)     Albumin 3.1 (*)     Total Bilirubin 2.3 (*)     Alkaline Phosphatase 582 (*)     AST 77 (*)     ALT 52 (*)     All other components within normal limits   MAGNESIUM - Abnormal; Notable for the following components:    Magnesium 1.5 (*)     All other components within normal limits   URINALYSIS, REFLEX TO URINE CULTURE - Abnormal; Notable for the following components:    Leukocytes, UA Trace (*)     All other components within normal limits    Narrative:     Specimen Source->Urine   AMMONIA - Abnormal; Notable for the following components:    Ammonia 56 (*)     All other components within normal limits   URINALYSIS MICROSCOPIC - Abnormal; Notable for the following components:    WBC, UA 10 (*)     Hyaline Casts, UA 5 (*)     All other components within normal limits    Narrative:     Specimen Source->Urine   ISTAT PROCEDURE - Abnormal; Notable for the following components:    POC Potassium 3.2 (*)     POC Hematocrit 32 (*)     All other components within normal limits   HIV 1 / 2 ANTIBODY    Narrative:     Release to patient->Immediate   HEPATITIS C ANTIBODY    Narrative:     Release to patient->Immediate   LACTIC ACID, PLASMA   TSH   PROTIME-INR   SARS-COV2 (COVID) WITH FLU/RSV BY PCR   ISTAT CHEM8     EKG Readings: (Independently Interpreted)   Initial Reading: No STEMI.  Previous EKG: Compared with most recent EKG Rhythm: Normal Sinus Rhythm. Heart Rate: 66. Conduction: LBBB. ST Segments: Non-Specific ST Segment Depression.     Imaging Results              US Doppler Liver Transplant Post (xpd) (Final result)  Result time 03/01/23 02:08:28   Procedure changed from US Liver with Doppler (xpd)     Final result by Clark Morales MD (03/01/23 02:08:28)                   Impression:      No evidence of portal venous thrombosis as questioned.  Satisfactory Doppler evaluation of the liver transplant.    Cirrhotic morphology of the hepatic allograft.  Questionable 2.8 cm hypoechoic area at the periphery of the left hepatic lobe which may reflect changes related to nodular contour versus possible lesion.  Consider further evaluation with multiphase CT or MRI if clinically warranted.    Electronically signed by resident: Lazaro Pacheco  Date:    03/01/2023  Time:    01:36    Electronically signed by: Clark Morales MD  Date:    03/01/2023  Time:    02:08               Narrative:    EXAMINATION:  US DOPPLER LIVER TRANSPLANT POST (XPD)    CLINICAL HISTORY:  R/O portal vein thrombosis; R/O Ascites;    TECHNIQUE:  Limited abdominal ultrasound of the transplant liver with Doppler evaluation.  Color and spectral Doppler were performed.    COMPARISON:  Liver transplant Doppler 01/03/2023, 03/25/2022, CT abdomen pelvis 03/24/2022    FINDINGS:  Patient is status post orthotopic liver transplant in 2002.  Liver allograft is demonstrates a nodular contour with heterogeneous echotexture suggestive of cirrhosis.  2.3 x 2.5 x 2.8 cm questionable slightly hypoattenuating area in the left hepatic lobe demonstrating vascularity, which may reflect nodular changes related to cirrhosis over a left hepatic lobe lesion.  No fluid collections or significant upper abdominal ascites.    The common duct is not significantly distended for post cholecystectomy status, maximally measuring 8 mm and similar to prior  noting it measured up to 7 mm on 03/25/2022.  No dilated intrahepatic radicles are seen.    Color flow and spectral waveform analysis was performed.  The main portal vein, right portal vein, left portal vein, middle hepatic vein, right hepatic vein, left hepatic vein, and IVC are patent with proper directional flow.  Peak velocity of the main portal vein is 30 cm/sec.    Anastomosis site of the main hepatic artery demonstrates a peak systolic velocity 131 cm/sec.  (Previously 137)    Main hepatic artery demonstrates resistive index 0.77 with normal waveform.  (Previously 0.79)    Left hepatic artery shows resistive index 0.75 with normal waveform.  (Previously 0.81)    Anterior branch of the right hepatic artery demonstrates resistive index 0.75 with normal waveform.  (Previously 0.81)    Posterior branch of the right hepatic artery demonstrates resistive index 0.72 with normal waveform.  (Previously 0.79)                                       CT Soft Tissue Neck With Contrast (Final result)  Result time 02/28/23 23:47:24      Final result by Clark Morales MD (02/28/23 23:47:24)                   Impression:      No drainable abscess identified in the neck soft tissues.  Further evaluation/follow-up including direct visualization as clinically warranted.    Patulous appearance of the esophagus with fluid and air in the esophageal lumen.    Additional findings discussed in the body of the report.      Electronically signed by: Clark Morales MD  Date:    02/28/2023  Time:    23:47               Narrative:    EXAMINATION:  CT SOFT TISSUE NECK WITH CONTRAST    CLINICAL HISTORY:  Neck mass, nonpulsatile;Neck abscess, deep tissue; No additional clinical history or physical exam provided in the chart at the time of dictation.    TECHNIQUE:  CT images obtained throughout the region of the neck after the administration of 75 mL Omnipaque 350 intravenous contrast. Axial, sagittal and coronal reconstructions were  performed.    COMPARISON:  CT chest, 09/04/2020.    FINDINGS:  Exam quality is limited by suboptimal positioning and motion artifact.    No abnormal fluid collection identified in the neck soft tissues.  No convincing enhancing mass identified in the neck soft tissues.  Recommend correlation with physical exam and/or direct visualization as clinically warranted.    Epiglottis is unremarkable.  The parotid and submandibular glands are symmetric without significant adjacent fat stranding.  Subcentimeter hypodensity in the left lobe of the thyroid gland, too small for dedicated follow-up.    Major vessels of the neck appear patent.  Atherosclerosis and narrowing of the bilateral carotid arteries.  Limited intracranial evaluation appears negative for acute finding.  The visualized paranasal sinuses are essentially clear.  Postoperative changes in the right mastoid air cells.  Left mastoid air cells are essentially clear.  Postoperative changes of suboccipital craniectomy.    There is a questionable partially visualized nodule versus atelectasis or scarring in the right upper lobe (axial series 3, image 394).  This finding is likely similar when compared with prior CT dated 09/04/2020.  Lung apices are otherwise unremarkable.    Patulous appearance of the visualized esophagus with air and fluid in the mid esophageal lumen.    No aggressive osseous lesion identified.  Minimal vertebral body height loss at T3, likely chronic.  Partially visualized postoperative changes of prior median sternotomy.                                       X-Ray Chest AP Portable (Final result)  Result time 02/28/23 19:36:17      Final result by Samuel Grant MD (02/28/23 19:36:17)                   Impression:      1. No acute cardiopulmonary process.      Electronically signed by: Samuel Grant MD  Date:    02/28/2023  Time:    19:36               Narrative:    EXAMINATION:  XR CHEST AP PORTABLE    CLINICAL  HISTORY:  ams;    TECHNIQUE:  Single frontal view of the chest was performed.    COMPARISON:  03/24/2022    FINDINGS:  The cardiomediastinal silhouette is not enlarged, magnified by technique.  There is no pleural effusion.  The trachea is midline.  The lungs are symmetrically expanded bilaterally without evidence of acute parenchymal process. No large focal consolidation seen.  There is no pneumothorax.  The osseous structures are remarkable for degenerative changes.  Patient is rotated..                                       CT Head Without Contrast (Final result)  Result time 02/28/23 18:54:58      Final result by González Kwok MD (02/28/23 18:54:58)                   Impression:      No evidence of acute intracranial pathology.  Additional evaluation, as clinically warranted.    Stable multifocal parenchymal calcifications, nonspecific and potentially related to remote infectious or metabolic etiology.    Electronically signed by resident: Jakob Monteiro  Date:    02/28/2023  Time:    18:36    Electronically signed by: González Kwok MD  Date:    02/28/2023  Time:    18:54               Narrative:    EXAMINATION:  CT HEAD WITHOUT CONTRAST    CLINICAL HISTORY:  Headache, chronic, new features or increased frequency;    TECHNIQUE:  Low dose axial CT images obtained throughout the head without the use of intravenous contrast.  Axial, sagittal and coronal reconstructions were performed.    COMPARISON:  CT 09/25/2020, MRI 04/24/2015    FINDINGS:  Intracranial compartment:    The ventricular configuration appears stable from prior exam without evidence of hydrocephalus.    Brain parenchyma appears unchanged with multifocal parenchymal calcifications stable in distribution compared to the prior exam.  No parenchymal mass, hemorrhage, edema or major vascular distribution infarct. No extra-axial blood or fluid collections.    Skull/extracranial contents (limited evaluation):    Remote postoperative changes of  suboccipital craniectomy.  Prior right mastoidectomy.  Left mastoid air cells are clear.  Visualized paranasal sinuses are clear.                                    X-Rays:   Independently Interpreted Readings:   Chest X-Ray: Normal heart size.  No infiltrates.  No acute abnormalities. No pneumothorax or free air   Medications   sodium chloride 0.9% flush 10 mL (has no administration in time range)   furosemide injection 40 mg (40 mg Intravenous Given 3/1/23 0959)   venlafaxine 24 hr capsule 150 mg (150 mg Oral Given 3/1/23 0959)   levothyroxine tablet 75 mcg (75 mcg Oral Given 3/1/23 0534)   pantoprazole EC tablet 40 mg (40 mg Oral Given 3/1/23 0959)   pravastatin tablet 10 mg (10 mg Oral Given 2/28/23 2114)   promethazine tablet 25 mg (has no administration in time range)   lisinopriL tablet 20 mg (20 mg Oral Given 3/1/23 0959)   tacrolimus capsule 0.5 mg (0.5 mg Oral Given 3/1/23 0959)   lactulose 20 gram/30 mL solution Soln 20 g (20 g Oral Given 3/1/23 0959)   albuterol-ipratropium 2.5 mg-0.5 mg/3 mL nebulizer solution 3 mL (has no administration in time range)   melatonin tablet 6 mg (has no administration in time range)   ondansetron injection 4 mg (has no administration in time range)   acetaminophen tablet 650 mg (has no administration in time range)   simethicone chewable tablet 80 mg (has no administration in time range)   naloxone 0.4 mg/mL injection 0.02 mg (has no administration in time range)   glucagon (human recombinant) injection 1 mg (has no administration in time range)   dextrose 10% bolus 125 mL 125 mL (has no administration in time range)   dextrose 10% bolus 250 mL 250 mL (has no administration in time range)   rifAXIMin tablet 550 mg (550 mg Oral Given 3/1/23 0959)   dicyclomine capsule 10 mg (has no administration in time range)   clonazePAM tablet 0.5 mg (has no administration in time range)   oxyCODONE immediate release tablet 5 mg (5 mg Oral Given 3/1/23 0959)   HYDROmorphone injection  0.5 mg (0.5 mg Intravenous Given 2/28/23 2241)   potassium chloride SA CR tablet 40 mEq (40 mEq Oral Given 2/28/23 2111)   iohexoL (OMNIPAQUE 350) injection 75 mL (75 mLs Intravenous Given 2/28/23 2259)   HYDROmorphone injection 0.5 mg (0.5 mg Intravenous Given 3/1/23 0003)     Medical Decision Making:   History:   I obtained history from: someone other than patient.  Old Medical Records: I decided to obtain old medical records.  Old Records Summarized: records from previous admission(s).  Initial Assessment:   Elda Hernandez is a 52-year-old female with a history of migraine disorder, hypertension, depression, Arnold-Chiari malformation, history of seizures, history of liver cirrhosis with liver fibrosis status post liver transferred presenting with altered mental status and headaches.   Differential Diagnosis:   Hepatic encephalopathy, altered mental status, dehydration, JACQUE, hepatorenal, ICH, electrolyte  Independently Interpreted Test(s):   I have ordered and independently interpreted X-rays - see prior notes.  I have ordered and independently interpreted EKG Reading(s) - see prior notes  Clinical Tests:   Lab Tests: Reviewed and Ordered       <> Summary of Lab: UA with trace leukocytes and 10 wbc's  COVID negative, influenza negative  CBC:  WBC at 3.2 with leukopenia, hemoglobin stable at 12.3  Total bili elevated 2.3, alk-phos elevated at 582 with elevated AST and ALT  Radiological Study: Ordered and Reviewed  Medical Tests: Ordered and Reviewed  Other:   I have discussed this case with another health care provider.       <> Summary of the Discussion: Hospital medicine                 Emergent evaluation of patient presenting with decompensated liver cirrhosis along with concern for confusion, disorientation, asterixis and hepatic encephalopathy.  Her most recent ammonia level was 156 with confusion, disorientation, lethargy and mental status changes.  She is currently afebrile vital signs are stable.   Physical exam findings remarkable for confusion, disorientation, positive tremor flap of the hands consistent with asterixis.  She is oriented to person and place but not to time and then has varying stages of orientation throughout.  She is complaining of a headache but then speaking with variable discussions not making sense.  Would give GCS of 14 based on confusion.  CT head obtained to rule out acute intracranial pathology given decompensated cirrhosis and asterixis.  CT head negative for intracranial process.  ECG without ischemia or STEMI on my read.  Placed on cardiac and telemetry monitoring including pulse oximetry.  Chest x-ray obtained with no acute findings on my read.  Labs show elevated total bilirubin to 2.3 with increased AST, ALT and alk-phos marked from before.  CBC with leukopenia 3.2, with stable hemoglobin.  No signs of hemolysis, hematochezia, melena or hematemesis.  Will treat with lactulose given concern for hepatic encephalopathy.  Her exam is consistent with hepatic encephalopathy.  Abdominal exam without significant tenderness doubt SBP.  Given acute decompensation over the last several days, discussed case with hospital medicine will admit for hepatic encephalopathy, treat with lactulose, continue infectious workup and monitor.  Patient agreeable to admission plan as family is present at bedside.       Clinical Impression:   Final diagnoses:  [R41.82] AMS (altered mental status)  [R41.0] Confusion (Primary)  [K74.69] Decompensated liver disease  [K76.82] Hepatic encephalopathy        ED Disposition Condition    Admit Stable               Maged Banuelos DO, FAAEM  Emergency Staff Physician   Dept of Emergency Medicine   [facility]  Spectralink: [id number]        Disclaimer: This note has been generated using voice-recognition software. There may be typographical errors that have been missed during proof-reading.       Maged Banuelos DO  03/01/23 [id number]

## 2023-02-28 NOTE — TELEPHONE ENCOUNTER
Received a call through the phone staff from the patient's  stating that the patient is lethargic.  She is now in the bed and he can hardly wake her up.  He will try to get her up.  I advised him to bring her in to main campus, if not to call an ambulance to bring her in.  She has a history of liver transplant and has recent ammonia level of 156.  He is agreeable with the plan.

## 2023-02-28 NOTE — TELEPHONE ENCOUNTER
Was routed a message by Sammi Soriano RN  Patient's  states patient is s/p Liver Transplant in 2002. Patient's  states patient currently with Cirrhosis Stage 3 with an elevated ammonia level of 156.  states patient is hallucinating and exhibiting odd behavior at this time.      Care Advice given to Call EMS/911 for immediate medical attention. Patient's  states understanding of care advice.     I tried to call the patient's  but had to leave a voicemail advising him to bring the patient to main campus ED if able or call EMS as directed by the prior nurse for evaluation.  He had send a message through my [facility] that Dr Nielsen has asked a question about.  I had previously told him if her symptoms worsen to bring her in.

## 2023-03-01 PROBLEM — G93.40 ENCEPHALOPATHY: Status: ACTIVE | Noted: 2023-03-01

## 2023-03-01 LAB
ALBUMIN SERPL BCP-MCNC: 2.5 G/DL (ref 3.5–5.2)
ALP SERPL-CCNC: 469 U/L (ref 55–135)
ALT SERPL W/O P-5'-P-CCNC: 41 U/L (ref 10–44)
ANION GAP SERPL CALC-SCNC: 8 MMOL/L (ref 8–16)
AST SERPL-CCNC: 61 U/L (ref 10–40)
BILIRUB SERPL-MCNC: 2.2 MG/DL (ref 0.1–1)
BUN SERPL-MCNC: 15 MG/DL (ref 6–20)
CALCIUM SERPL-MCNC: 8.2 MG/DL (ref 8.7–10.5)
CHLORIDE SERPL-SCNC: 106 MMOL/L (ref 95–110)
CO2 SERPL-SCNC: 25 MMOL/L (ref 23–29)
CREAT SERPL-MCNC: 0.9 MG/DL (ref 0.5–1.4)
ERYTHROCYTE [DISTWIDTH] IN BLOOD BY AUTOMATED COUNT: 15 % (ref 11.5–14.5)
EST. GFR  (NO RACE VARIABLE): >60 ML/MIN/1.73 M^2
GLUCOSE SERPL-MCNC: 68 MG/DL (ref 70–110)
HCT VFR BLD AUTO: 32.9 % (ref 37–48.5)
HGB BLD-MCNC: 10.8 G/DL (ref 12–16)
MAGNESIUM SERPL-MCNC: 1.4 MG/DL (ref 1.6–2.6)
MCH RBC QN AUTO: 30.8 PG (ref 27–31)
MCHC RBC AUTO-ENTMCNC: 32.8 G/DL (ref 32–36)
MCV RBC AUTO: 94 FL (ref 82–98)
PLATELET # BLD AUTO: 58 K/UL (ref 150–450)
PMV BLD AUTO: 11.9 FL (ref 9.2–12.9)
POTASSIUM SERPL-SCNC: 3.9 MMOL/L (ref 3.5–5.1)
PROT SERPL-MCNC: 5.7 G/DL (ref 6–8.4)
RBC # BLD AUTO: 3.51 M/UL (ref 4–5.4)
SODIUM SERPL-SCNC: 139 MMOL/L (ref 136–145)
TACROLIMUS BLD-MCNC: 6.1 NG/ML (ref 5–15)
WBC # BLD AUTO: 3.12 K/UL (ref 3.9–12.7)

## 2023-03-01 PROCEDURE — 85027 COMPLETE CBC AUTOMATED: CPT | Performed by: STUDENT IN AN ORGANIZED HEALTH CARE EDUCATION/TRAINING PROGRAM

## 2023-03-01 PROCEDURE — 99223 PR INITIAL HOSPITAL CARE,LEVL III: ICD-10-PCS | Mod: ,,, | Performed by: STUDENT IN AN ORGANIZED HEALTH CARE EDUCATION/TRAINING PROGRAM

## 2023-03-01 PROCEDURE — 83735 ASSAY OF MAGNESIUM: CPT | Performed by: STUDENT IN AN ORGANIZED HEALTH CARE EDUCATION/TRAINING PROGRAM

## 2023-03-01 PROCEDURE — 25500020 PHARM REV CODE 255

## 2023-03-01 PROCEDURE — 80197 ASSAY OF TACROLIMUS: CPT | Performed by: STUDENT IN AN ORGANIZED HEALTH CARE EDUCATION/TRAINING PROGRAM

## 2023-03-01 PROCEDURE — 25000003 PHARM REV CODE 250: Performed by: STUDENT IN AN ORGANIZED HEALTH CARE EDUCATION/TRAINING PROGRAM

## 2023-03-01 PROCEDURE — 20600001 HC STEP DOWN PRIVATE ROOM

## 2023-03-01 PROCEDURE — 63600175 PHARM REV CODE 636 W HCPCS: Performed by: STUDENT IN AN ORGANIZED HEALTH CARE EDUCATION/TRAINING PROGRAM

## 2023-03-01 PROCEDURE — 94761 N-INVAS EAR/PLS OXIMETRY MLT: CPT

## 2023-03-01 PROCEDURE — 80053 COMPREHEN METABOLIC PANEL: CPT | Performed by: STUDENT IN AN ORGANIZED HEALTH CARE EDUCATION/TRAINING PROGRAM

## 2023-03-01 PROCEDURE — 99223 1ST HOSP IP/OBS HIGH 75: CPT | Mod: GC,,, | Performed by: STUDENT IN AN ORGANIZED HEALTH CARE EDUCATION/TRAINING PROGRAM

## 2023-03-01 PROCEDURE — 99223 1ST HOSP IP/OBS HIGH 75: CPT | Mod: ,,, | Performed by: STUDENT IN AN ORGANIZED HEALTH CARE EDUCATION/TRAINING PROGRAM

## 2023-03-01 PROCEDURE — 36415 COLL VENOUS BLD VENIPUNCTURE: CPT | Performed by: STUDENT IN AN ORGANIZED HEALTH CARE EDUCATION/TRAINING PROGRAM

## 2023-03-01 PROCEDURE — A9585 GADOBUTROL INJECTION: HCPCS | Performed by: HOSPITALIST

## 2023-03-01 PROCEDURE — 25000003 PHARM REV CODE 250: Performed by: HOSPITALIST

## 2023-03-01 PROCEDURE — 25500020 PHARM REV CODE 255: Performed by: HOSPITALIST

## 2023-03-01 PROCEDURE — 99223 PR INITIAL HOSPITAL CARE,LEVL III: ICD-10-PCS | Mod: GC,,, | Performed by: STUDENT IN AN ORGANIZED HEALTH CARE EDUCATION/TRAINING PROGRAM

## 2023-03-01 PROCEDURE — 63600175 PHARM REV CODE 636 W HCPCS: Performed by: HOSPITALIST

## 2023-03-01 RX ORDER — GADOBUTROL 604.72 MG/ML
5 INJECTION INTRAVENOUS
Status: COMPLETED | OUTPATIENT
Start: 2023-03-01 | End: 2023-03-01

## 2023-03-01 RX ORDER — SODIUM CHLORIDE 9 MG/ML
INJECTION, SOLUTION INTRAVENOUS CONTINUOUS
Status: ACTIVE | OUTPATIENT
Start: 2023-03-01 | End: 2023-03-02

## 2023-03-01 RX ORDER — CLONAZEPAM 0.5 MG/1
0.5 TABLET ORAL 2 TIMES DAILY PRN
Status: DISCONTINUED | OUTPATIENT
Start: 2023-03-01 | End: 2023-03-06 | Stop reason: HOSPADM

## 2023-03-01 RX ORDER — MAGNESIUM SULFATE HEPTAHYDRATE 40 MG/ML
2 INJECTION, SOLUTION INTRAVENOUS ONCE
Status: COMPLETED | OUTPATIENT
Start: 2023-03-01 | End: 2023-03-01

## 2023-03-01 RX ORDER — OXYCODONE HYDROCHLORIDE 5 MG/1
5 TABLET ORAL EVERY 6 HOURS PRN
Status: DISCONTINUED | OUTPATIENT
Start: 2023-03-01 | End: 2023-03-03

## 2023-03-01 RX ORDER — HYDROMORPHONE HYDROCHLORIDE 1 MG/ML
1 INJECTION, SOLUTION INTRAMUSCULAR; INTRAVENOUS; SUBCUTANEOUS EVERY 6 HOURS PRN
Status: DISCONTINUED | OUTPATIENT
Start: 2023-03-01 | End: 2023-03-02

## 2023-03-01 RX ADMIN — IOHEXOL 15 ML: 350 INJECTION, SOLUTION INTRAVENOUS at 06:03

## 2023-03-01 RX ADMIN — VENLAFAXINE HYDROCHLORIDE 150 MG: 150 CAPSULE, EXTENDED RELEASE ORAL at 09:03

## 2023-03-01 RX ADMIN — LISINOPRIL 20 MG: 20 TABLET ORAL at 09:03

## 2023-03-01 RX ADMIN — IOHEXOL 75 ML: 350 INJECTION, SOLUTION INTRAVENOUS at 09:03

## 2023-03-01 RX ADMIN — GADOBUTROL 5 ML: 604.72 INJECTION INTRAVENOUS at 10:03

## 2023-03-01 RX ADMIN — HYDROMORPHONE HYDROCHLORIDE 1 MG: 1 INJECTION, SOLUTION INTRAMUSCULAR; INTRAVENOUS; SUBCUTANEOUS at 08:03

## 2023-03-01 RX ADMIN — PRAVASTATIN SODIUM 10 MG: 10 TABLET ORAL at 08:03

## 2023-03-01 RX ADMIN — LEVOTHYROXINE SODIUM 75 MCG: 75 TABLET ORAL at 05:03

## 2023-03-01 RX ADMIN — IOHEXOL 15 ML: 350 INJECTION, SOLUTION INTRAVENOUS at 07:03

## 2023-03-01 RX ADMIN — HYDROMORPHONE HYDROCHLORIDE 0.5 MG: 1 INJECTION, SOLUTION INTRAMUSCULAR; INTRAVENOUS; SUBCUTANEOUS at 12:03

## 2023-03-01 RX ADMIN — MAGNESIUM SULFATE 2 G: 2 INJECTION INTRAVENOUS at 04:03

## 2023-03-01 RX ADMIN — TACROLIMUS 0.5 MG: 0.5 CAPSULE ORAL at 09:03

## 2023-03-01 RX ADMIN — HYDROMORPHONE HYDROCHLORIDE 1 MG: 1 INJECTION, SOLUTION INTRAMUSCULAR; INTRAVENOUS; SUBCUTANEOUS at 02:03

## 2023-03-01 RX ADMIN — LACTULOSE 20 G: 20 SOLUTION ORAL at 02:03

## 2023-03-01 RX ADMIN — LACTULOSE 20 G: 20 SOLUTION ORAL at 09:03

## 2023-03-01 RX ADMIN — FUROSEMIDE 40 MG: 10 INJECTION, SOLUTION INTRAMUSCULAR; INTRAVENOUS at 09:03

## 2023-03-01 RX ADMIN — OXYCODONE 5 MG: 5 TABLET ORAL at 09:03

## 2023-03-01 RX ADMIN — PANTOPRAZOLE SODIUM 40 MG: 40 TABLET, DELAYED RELEASE ORAL at 09:03

## 2023-03-01 RX ADMIN — TACROLIMUS 0.5 MG: 0.5 CAPSULE ORAL at 05:03

## 2023-03-01 RX ADMIN — LACTULOSE 20 G: 20 SOLUTION ORAL at 08:03

## 2023-03-01 RX ADMIN — PROMETHAZINE HYDROCHLORIDE 25 MG: 25 TABLET ORAL at 03:03

## 2023-03-01 RX ADMIN — RIFAXIMIN 550 MG: 550 TABLET ORAL at 08:03

## 2023-03-01 RX ADMIN — RIFAXIMIN 550 MG: 550 TABLET ORAL at 09:03

## 2023-03-01 RX ADMIN — SODIUM CHLORIDE: 9 INJECTION, SOLUTION INTRAVENOUS at 04:03

## 2023-03-01 NOTE — H&P
"Swapnil Oscar - Telemetry [facility] Medicine  History & Physical    Patient Name: Elda Hernandez  MRN: [id number]  Patient Class: IP- Inpatient  Admission Date: 2/28/2023  Attending Physician: Araceli Carrington MD   Primary Care Provider: David Lorenzo MD    Patient information was obtained from patient, relative(s), past medical records and ER records.     Subjective:     Principal Problem:Decompensated liver disease    Chief Complaint:   Chief Complaint   Patient presents with    Altered Mental Status     Ammonia level, lever transplant 2002, ams for few weeks, headache        HPI: Elda Hernandez is a 52-year-old woman a past medical history of primary hypertension, cirrhosis of transplanted liver, Von Gierke disease s/p transplant, and hypothyroidism, who presents to the emergency department with altered mental status. The patient is accompanied by her  who assists in providing the history. Per report, the patient has been confused and repetitive with her speech per her 's report for the past week. The patient follows closely with the Liver Transplant clinic and recently had her ammonia checked given the confusion and it was noted that it was elevated. She was recently started on lactulose by her provider and states that she has been having at least one bowel movement per day with the exception of yesterday when the patient said she had multiple episodes of loose, runny stool. She also notes increasing abdominal distention and generalized abdominal pain. The patient says that the last time she was hospitalized, a paracentesis was attempted but was not successful given there was not a pocket of fluid that was able to be drained. The patient denies any fevers, chills or rigors but states that she often does not become febrile because of her immunosuppression. Additionally, the patient complains of a left-sided neck mass that has been present for "some time." She says that her primary care " physician is aware of the mass and that he ordered an ultrasound to further investigate the mass but says that it has not yet been completed. She also complains of a severe headache that is generalized. She denies any chest pain, shortness of breath or dyspnea on exertion.    In the emergency department, the patient was noted to be have stable vital signs upon arrival. Her labs were significant for a chronic neutropenia and thrombocytopenia with platelet count of 54. Potassium was noted to be 3.3. Magnesium was 1.5. The patient's liver enzymes elevated with AST 77 ALT 52 and alkaline phosphatase 582. Bilirubin at 2.3. Ammonia at 56. A CT head was ordered which revealed no acute abnormality. The patient was admitted to Hospital Medicine for further management.      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    Arnold-Chiari malformation     Depression     Esophageal stricture     Essential tremor     Hypertension     Left bundle branch block     Liver fibrosis, transplanted liver 10/2/2018    Suggested on fibroscan 10/2/18    Migraine without aura     MVP (mitral valve prolapse)     Non-rheumatic mitral regurgitation 10/1/2018    Non-rheumatic tricuspid valve insufficiency 10/1/2018    Osteoporosis     Recurrent urinary tract infection     Seizures     Shingles 2007    SIADH (syndrome of inappropriate ADH production)     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    Tricuspid valve prolapse     Urolithiasis     Von Gierke disease     s/p liver transplant       Past Surgical History:   Procedure Laterality Date    APPENDECTOMY  6/22/2007    APPLICATION OF WOUND VACUUM-ASSISTED CLOSURE DEVICE N/A 9/18/2020    Procedure: APPLICATION, WOUND VAC;  Surgeon: Zain Decker MD;  Location: [facility] OR [address] [place] [address];  Service: General;  Laterality: N/A;    COLONOSCOPY  5/13/2008    internal hemorrhoids    CRANIOTOMY      ESOPHAGOGASTRODUODENOSCOPY N/A 9/3/2020    Procedure:  EGD (ESOPHAGOGASTRODUODENOSCOPY);  Surgeon: Tyrel Vergara MD;  Location: [facility];  Service: Endoscopy;  Laterality: N/A;    EXPLORATORY LAPAROTOMY  2020    due to perforated stomach    LAMINECTOMY  3/2001    LIVER TRANSPLANT  9/23/2002    OSSICULAR RECONSTRUCTION  10/4/1995    RIGHT REPLACEMENT PROSTHESIS for cholesteatoma    THYMECTOMY  5/2/2007    TONSILLECTOMY, ADENOIDECTOMY  1/21/2004    TOTAL ABDOMINAL HYSTERECTOMY  3/31/1994       Review of patient's allergies indicates:   Allergen Reactions    Codeine Itching     Other reaction(s): Itching    Lipitor [atorvastatin] Other (See Comments)     Other reaction(s): Muscle pain  Muscle cranmps    Morphine Itching     Other reaction(s): nausea and vomiting     Zoloft [sertraline] Other (See Comments)     Tremors/muscle spasms       No current facility-administered medications on file prior to encounter.     Current Outpatient Medications on File Prior to Encounter   Medication Sig    acetaminophen (TYLENOL) 325 MG tablet Take 2 tablets (650 mg total) by mouth every 6 (six) hours as needed. (Patient taking differently: Take 650 mg by mouth 2 (two) times daily as needed (headache).)    butalbitaL-acetaminophen  mg Tab Take 1 tablet by mouth 2 (two) times daily as needed (headaches).    calcium carbonate (TUMS) 200 mg calcium (500 mg) chewable tablet Take 1 tablet (500 mg total) by mouth 2 (two) times daily as needed. (Patient taking differently: Take 1,500 mg by mouth daily as needed for Heartburn.)    calcium phosphate trib/vit D3 (CALTRATE GUMMY BITES ORAL) Take by mouth.    clonazePAM (KLONOPIN) 0.5 MG tablet Take 1 tablet (0.5 mg total) by mouth 2 (two) times daily as needed for Anxiety.    flu vacc [id number] 6mos up,PF, (FLUARIX QUAD [id number], PF,) 60 mcg (15 mcg x 4)/0.5 mL Syrg Inject 0.5 mLs into the muscle.    FLUCELVAX QUAD 9718-1475, PF, 60 mcg (15 mcg x 4)/0.5 mL Syrg     fluorometholone 0.1% (FML) 0.1 % DrpS Place 2 drops  into the left eye 2 (two) times daily.    furosemide (LASIX) 20 MG tablet TAKE 1 TABLET(20 MG) BY MOUTH EVERY DAY    hydrocortisone 2.5 % cream Apply topically to affected area twice daily as needed    lactulose (CHRONULAC) 10 gram/15 mL solution Take 30 mLs (20 g total) by mouth 2 (two) times daily.    levothyroxine (SYNTHROID) 75 MCG tablet TAKE 1 TABLET(75 MCG) BY MOUTH EVERY DAY    magnesium oxide (MAG-OX) 400 mg (241.3 mg magnesium) tablet TAKE 1 TABLET(400 MG) BY MOUTH TWICE DAILY    multivitamin capsule Take 1 capsule by mouth once daily.    ondansetron (ZOFRAN-ODT) 4 MG TbDL DISSOLVE 1 TABLET(4 MG) ON THE TONGUE EVERY 6 HOURS AS NEEDED    pantoprazole (PROTONIX) 40 MG tablet Take 1 tablet (40 mg total) by mouth once daily.    polyethylene glycol (GLYCOLAX) 17 gram/dose powder MIX 17 GRAMS (1 capful) IN LIQUID AND DRINK BY MOUTH TWICE DAILY    pravastatin (PRAVACHOL) 10 MG tablet Take 1 tablet (10 mg total) by mouth once daily.    promethazine (PHENERGAN) 25 MG tablet TAKE 1 TABLET BY MOUTH EVERY 4 HOURS AS NEEDED IF UNRELIEVED BY ZOFRAN    psyllium husk, aspartame, (METAMUCIL) 3.4 gram PwPk packet Take 1 packet by mouth once daily.    ramipriL (ALTACE) 5 MG capsule TAKE 1 CAPSULE(5 MG) BY MOUTH EVERY EVENING    SHINGRIX, PF, 50 mcg/0.5 mL injection     tacrolimus (PROGRAF) 0.5 MG Cap Take 1 capsule (0.5 mg total) by mouth every 12 (twelve) hours.    valACYclovir (VALTREX) 1000 MG tablet TAKE 2 TABLETS(2000 MG) BY MOUTH TWICE DAILY FOR 2 DOSES    venlafaxine (EFFEXOR-XR) 150 MG Cp24 TAKE 1 CAPSULE(150 MG) BY MOUTH EVERY DAY     Family History       Problem Relation (Age of Onset)    Heart disease Mother    No Known Problems Father    Stroke Mother          Tobacco Use    Smoking status: Never    Smokeless tobacco: Never   Substance and Sexual Activity    Alcohol use: No    Drug use: No    Sexual activity: Not on file     Review of Systems   Constitutional:  Negative for chills, fatigue  and fever.   HENT:  Positive for sore throat. Negative for congestion, postnasal drip, rhinorrhea and sneezing.    Eyes:  Negative for photophobia and visual disturbance.   Respiratory:  Negative for cough, choking, chest tightness and shortness of breath.    Cardiovascular:  Negative for chest pain, palpitations and leg swelling.   Gastrointestinal:  Positive for abdominal distention and abdominal pain. Negative for constipation, diarrhea, nausea and vomiting.   Endocrine: Negative for polyphagia and polyuria.   Genitourinary:  Negative for decreased urine volume, difficulty urinating, dysuria and urgency.   Musculoskeletal:  Positive for gait problem. Negative for arthralgias and myalgias.   Skin:  Negative for rash and wound.   Neurological:  Positive for dizziness, tremors, light-headedness and headaches. Negative for seizures and syncope.   Psychiatric/Behavioral:  Positive for confusion.    Objective:     Vital Signs (Most Recent):  Temp: 97.9 °F (36.6 °C) (02/28/23 1712)  Pulse: 63 (02/28/23 1712)  Resp: 18 (02/28/23 1712)  BP: 139/63 (02/28/23 1712)  SpO2: 99 % (02/28/23 2000) Vital Signs (24h Range):  Temp:  [97.9 °F (36.6 °C)] 97.9 °F (36.6 °C)  Pulse:  [63] 63  Resp:  [18] 18  SpO2:  [98 %-99 %] 99 %  BP: (139)/(63) 139/63     Weight: 50.8 kg (112 lb)  Body mass index is 22.62 kg/m².    Physical Exam  Vitals reviewed. Exam conducted with a chaperone present.   Constitutional:       General: She is not in acute distress.     Appearance: Normal appearance. She is normal weight. She is not ill-appearing, toxic-appearing or diaphoretic.      Comments: Pleasant woman in no acute distress. She is cooperative with examination and attempts to participate in interview.   HENT:      Head: Normocephalic and atraumatic.      Right Ear: There is no impacted cerumen.      Left Ear: There is no impacted cerumen.      Nose: No congestion or rhinorrhea.      Mouth/Throat:      Mouth: Mucous membranes are moist.       Pharynx: Oropharynx is clear. No oropharyngeal exudate or posterior oropharyngeal erythema.   Eyes:      General: No scleral icterus.     Extraocular Movements: Extraocular movements intact.   Neck:      Comments: Small, nonmobile mass palpated in left lateral portion of the patient's neck  Cardiovascular:      Rate and Rhythm: Normal rate and regular rhythm.      Pulses: Normal pulses.      Heart sounds: Normal heart sounds. No murmur heard.    No friction rub. No gallop.   Pulmonary:      Effort: Pulmonary effort is normal. No respiratory distress.      Breath sounds: No stridor. No wheezing, rhonchi or rales.   Chest:      Chest wall: No tenderness.   Abdominal:      General: Abdomen is flat. Bowel sounds are normal. There is no distension.      Palpations: There is no mass.      Tenderness: There is no abdominal tenderness. There is no guarding or rebound.      Hernia: No hernia is present.   Musculoskeletal:      Cervical back: Neck supple.      Right lower leg: No edema.      Left lower leg: No edema.   Skin:     General: Skin is warm and dry.   Neurological:      Mental Status: She is alert. Mental status is at baseline. She is disoriented.      Sensory: No sensory deficit.      Motor: No weakness.      Comments: Patient with flapping tremor noted with hands outstretched   Psychiatric:         Mood and Affect: Mood normal.         Behavior: Behavior normal.           Significant Labs: All pertinent labs within the past 24 hours have been reviewed.  CBC:   Recent Labs   Lab 02/28/23  1759 02/28/23  1830   WBC 3.20*  --    HGB 12.3  --    HCT 36.6* 32*   PLT 54*  --      CMP:   Recent Labs   Lab 02/28/23  1759      K 3.3*      CO2 25   GLU 73   BUN 14   CREATININE 0.9   CALCIUM 8.9   PROT 7.1   ALBUMIN 3.1*   BILITOT 2.3*   ALKPHOS 582*   AST 77*   ALT 52*   ANIONGAP 11       Significant Imaging: I have reviewed all pertinent imaging results/findings within the past 24 hours.    Assessment/Plan:      * Decompensated liver disease  Patient presenting with confusion and disorientation with notable flapping tremor concerning for hepatic encephalopathy. 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 Patient also with abdominal distention and generalized abdominal pain concerning for underlying ascites. She denies any recent GI bleeding. Follows with Liver Transplant clinic as an outpatient.  - Hepatology consulted, appreciate recommendations  - begin lactulose 20 g TID  - begin rifaximin 500 mg BID  - will begin IV furosemide 40 mg given increased abdominal distention      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  Will resume home tacrolimus 0.5 mg BID. Trend tacrolimus levels daily.      Hypothyroidism  Resume home levothyroxine.      Depression, recurrent  Resume home venlafaxine 150 mg daily.      SIADH (syndrome of inappropriate ADH production)  Patient used to take demeclocycline at home. Patient's sodium level within normal limits. Will continue to trend.      VTE Risk Mitigation (From admission, onward)         Ordered     Place sequential compression device  Until discontinued         03/01/23 0151     IP VTE LOW RISK PATIENT  Once         02/28/23 1958               Code Status: FULL    Clayton Shore MD  Department of Hospital Medicine   Swapnil Oscar - Telemetry Stepdown

## 2023-03-01 NOTE — NURSING
"Pt noted to appear anxious this am. Hands rubbing all over face, through hair, over abd and BUE. Speaks rapidly with much redirection required. She is alert but with confusion until speech is slowed and pt refocused to topic.She states she is in pain but wishes to have Dilaudid 2mg IVP every 2 hours routine et states nothing else will work. Asked pt pain on scale of 1-10 states it is "currently a 9 but hoping for a 10." Questioned pt again and said that it "may be a 2 but 10 is the goal for everything." Pt questioned if she has required medications for anxiety in the past, she states "Klonazepam 0.5mg." Pt frequently falls asleep in between statements. MD is aware of above.   "

## 2023-03-01 NOTE — HPI
"Elda Hernandez is a 52-year-old woman a past medical history of primary hypertension, cirrhosis of transplanted liver, Von Gierke disease s/p transplant, and hypothyroidism, who presents to the emergency department with altered mental status. The patient is accompanied by her  who assists in providing the history. Per report, the patient has been confused and repetitive with her speech per her 's report for the past week. The patient follows closely with the Liver Transplant clinic and recently had her ammonia checked given the confusion and it was noted that it was elevated. She was recently started on lactulose by her provider and states that she has been having at least one bowel movement per day with the exception of yesterday when the patient said she had multiple episodes of loose, runny stool. She also notes increasing abdominal distention and generalized abdominal pain. The patient says that the last time she was hospitalized, a paracentesis was attempted but was not successful given there was not a pocket of fluid that was able to be drained. The patient denies any fevers, chills or rigors but states that she often does not become febrile because of her immunosuppression. Additionally, the patient complains of a left-sided neck mass that has been present for "some time." She says that her primary care physician is aware of the mass and that he ordered an ultrasound to further investigate the mass but says that it has not yet been completed. She also complains of a severe headache that is generalized. She denies any chest pain, shortness of breath or dyspnea on exertion.    In the emergency department, the patient was noted to be have stable vital signs upon arrival. Her labs were significant for a chronic neutropenia and thrombocytopenia with platelet count of 54. Potassium was noted to be 3.3. Magnesium was 1.5. The patient's liver enzymes elevated with AST 77 ALT 52 and alkaline phosphatase " 582. Bilirubin at 2.3. Ammonia at 56. A CT head was ordered which revealed no acute abnormality. The patient was admitted to Hospital Medicine for further management.

## 2023-03-01 NOTE — ASSESSMENT & PLAN NOTE
Patient presenting with confusion and disorientation with notable flapping tremor concerning for hepatic encephalopathy. 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 Patient also with abdominal distention and generalized abdominal pain concerning for underlying ascites. She denies any recent GI bleeding. Follows with Liver Transplant clinic as an outpatient.  - Hepatology consulted, appreciate recommendations  - begin lactulose 20 g TID  - begin rifaximin 500 mg BID  - will begin IV furosemide 40 mg given increased abdominal distention

## 2023-03-01 NOTE — ASSESSMENT & PLAN NOTE
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 Decompensation with ascites, hepatic encephalopathy. Ultrasound without significant ascites here but notable for 2.8 cm hypo attenuation. Asterixis noted on examination.      MELD-Na score: 10 at 3/1/2023  2:57 AM  MELD score: 10 at 3/1/2023  2:57 AM  Calculated from:  Serum Creatinine: 0.9 mg/dL (Using min of 1 mg/dL) at 3/1/2023  2:57 AM  Serum Sodium: 139 mmol/L (Using max of 137 mmol/L) at 3/1/2023  2:57 AM  Total Bilirubin: 2.2 mg/dL at 3/1/2023  2:57 AM  INR(ratio): 1.1 at 2/28/2023  5:59 PM  Age: 52 years     Suspect there is likely component of hepatic encephalopathy but patient's symptoms of severe headache and gait imbalance warrant further work up. Of note patient with previous history of chiari malformation and craniotomy.    - continue lactulose, titrate to 3-4 BM daily  - continue rifaximin  - continue tacrolimus 0.5 mg BID  - recommend MRI or CT triple phase to further evaluate liver hypoattenuation

## 2023-03-01 NOTE — ED NOTES
I-STAT Chem-8+ Results:   Value Reference Range   Sodium 140 136-145 mmol/L   Potassium  3.2 3.5-5.1 mmol/L   Chloride 100  mmol/L   Ionized Calcium 1.16 1.06-1.42 mmol/L   CO2 (measured) 26 23-29 mmol/L   Glucose 72  mg/dL   BUN 14 6-30 mg/dL   Creatinine 0.9 0.5-1.4 mg/dL   Hematocrit 32 36-54%

## 2023-03-01 NOTE — ED NOTES
Telemetry Verification   Patient placed on Telemetry Box  Verified with War Room  Box # [id number]   Monitor Tech    Rate    Rhythm

## 2023-03-01 NOTE — NURSING
New prn order for Dilaudid 1mg IVP rec'd et administered. Pt agreed to let staff reapply cardiac monitor.

## 2023-03-01 NOTE — HPI
"Elda Hernandez is a 52 year old woman with HTN, hypothyroidism, Von Gierke disease s/p liver transplant 2002 complicated by graft cirrhosis due to biliary stricture and chronic rejection who presents with altered mental status. Patient states that she has been having increasing episodes of confusion at home over the last month described as "repetitive speech" and "falling sideways". Patient was started on lactulose as an outpatient when this began as her ammonia was noted to be elevated. She describes strict adherence to lactulose and has had 1 bowel movement daily. Additionally, patient describes increased abdominal distension over this time. She also describes diffuse headache. Patient describes fever, chills, shortness of breath, changes in bowel and bladder.  "

## 2023-03-01 NOTE — CONSULTS
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 Patient states that she has been having increasing episodes of confusion at home over the last month described as "repetitive speech" and "falling sideways". Patient was started on lactulose as an outpatient when this began as her ammonia was noted to be elevated. She describes strict adherence to lactulose and has had 1 bowel movement daily. Additionally, patient describes increased abdominal distension over this time. She also describes diffuse headache. Patient describes fever, chills, shortness of breath, changes in bowel and bladder.      Review of Systems   Constitutional:  Negative for chills, fatigue and fever.   HENT:  Negative for congestion, postnasal drip, rhinorrhea, sneezing and sore throat.    Eyes:  Negative for photophobia and visual disturbance.   Respiratory:  Negative for cough, choking, chest tightness and shortness of breath.    Cardiovascular:  Negative for chest pain, palpitations and leg swelling.   Gastrointestinal:  Positive for abdominal distention and abdominal pain. Negative for constipation, diarrhea, nausea and vomiting.   Endocrine: Negative for polyphagia and polyuria.   Genitourinary:  Negative for decreased urine volume, difficulty urinating, dysuria and urgency.   Musculoskeletal:  " Positive for gait problem. Negative for arthralgias and myalgias.   Skin:  Negative for rash and wound.   Neurological:  Positive for dizziness, tremors, light-headedness and headaches. Negative for seizures and syncope.   Psychiatric/Behavioral:  Positive for confusion.      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    Arnold-Chiari malformation     Depression     Esophageal stricture     Essential tremor     Hypertension     Left bundle branch block     Liver fibrosis, transplanted liver 10/2/2018    Suggested on fibroscan 10/2/18    Migraine without aura     MVP (mitral valve prolapse)     Non-rheumatic mitral regurgitation 10/1/2018    Non-rheumatic tricuspid valve insufficiency 10/1/2018    Osteoporosis     Recurrent urinary tract infection     Seizures     Shingles 2007    SIADH (syndrome of inappropriate ADH production)     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    Tricuspid valve prolapse     Urolithiasis     Von Gierke disease     s/p liver transplant       Past Surgical History:   Procedure Laterality Date    APPENDECTOMY  6/22/2007    APPLICATION OF WOUND VACUUM-ASSISTED CLOSURE DEVICE N/A 9/18/2020    Procedure: APPLICATION, WOUND VAC;  Surgeon: Zain Decker MD;  Location: [address];  Service: General;  Laterality: N/A;    COLONOSCOPY  5/13/2008    internal hemorrhoids    CRANIOTOMY      ESOPHAGOGASTRODUODENOSCOPY N/A 9/3/2020    Procedure: EGD (ESOPHAGOGASTRODUODENOSCOPY);  Surgeon: Tyrel Vergara MD;  Location: [facility];  Service: Endoscopy;  Laterality: N/A;    EXPLORATORY LAPAROTOMY  2020    due to perforated stomach    LAMINECTOMY  3/2001    LIVER TRANSPLANT  9/23/2002    OSSICULAR RECONSTRUCTION  10/4/1995    RIGHT REPLACEMENT PROSTHESIS for cholesteatoma    THYMECTOMY  5/2/2007    TONSILLECTOMY, ADENOIDECTOMY  1/21/2004    TOTAL ABDOMINAL HYSTERECTOMY  3/31/1994       Family history of liver disease:  No    Review of patient's allergies indicates:   Allergen Reactions    Codeine Itching     Other reaction(s): Itching    Lipitor [atorvastatin] Other (See Comments)     Other reaction(s): Muscle pain  Muscle cranmps    Morphine Itching     Other reaction(s): nausea and vomiting     Zoloft [sertraline] Other (See Comments)     Tremors/muscle spasms         Tobacco Use    Smoking status: Never    Smokeless tobacco: Never   Substance and Sexual Activity    Alcohol use: No    Drug use: No    Sexual activity: Not on file       Medications Prior to Admission   Medication Sig Dispense Refill Last Dose    acetaminophen (TYLENOL) 325 MG tablet Take 2 tablets (650 mg total) by mouth every 6 (six) hours as needed. (Patient taking differently: Take 650 mg by mouth 2 (two) times daily as needed (headache).)  0     butalbitaL-acetaminophen  mg Tab Take 1 tablet by mouth 2 (two) times daily as needed (headaches). 60 tablet 3     calcium carbonate (TUMS) 200 mg calcium (500 mg) chewable tablet Take 1 tablet (500 mg total) by mouth 2 (two) times daily as needed. (Patient taking differently: Take 1,500 mg by mouth daily as needed for Heartburn.)       calcium phosphate trib/vit D3 (CALTRATE GUMMY BITES ORAL) Take by mouth.       clonazePAM (KLONOPIN) 0.5 MG tablet Take 1 tablet (0.5 mg total) by mouth 2 (two) times daily as needed for Anxiety. 60 tablet 3     flu vacc [id number] 6mos up,PF, (FLUARIX QUAD [id number], PF,) 60 mcg (15 mcg x 4)/0.5 mL Syrg Inject 0.5 mLs into the muscle. 0.5 mL 0     FLUCELVAX QUAD [id number], PF, 60 mcg (15 mcg x 4)/0.5 mL Syrg        fluorometholone 0.1% (FML) 0.1 % DrpS Place 2 drops into the left eye 2 (two) times daily.       furosemide (LASIX) 20 MG tablet TAKE 1 TABLET(20 MG) BY MOUTH EVERY DAY 90 tablet 3     hydrocortisone 2.5 % cream Apply topically to affected area twice daily as needed       lactulose (CHRONULAC) 10 gram/15 mL solution Take 30 mLs (20 g total) by mouth  2 (two) times daily. 473 mL 0     levothyroxine (SYNTHROID) 75 MCG tablet TAKE 1 TABLET(75 MCG) BY MOUTH EVERY DAY 90 tablet 3     magnesium oxide (MAG-OX) 400 mg (241.3 mg magnesium) tablet TAKE 1 TABLET(400 MG) BY MOUTH TWICE DAILY 180 tablet 3     multivitamin capsule Take 1 capsule by mouth once daily.       ondansetron (ZOFRAN-ODT) 4 MG TbDL DISSOLVE 1 TABLET(4 MG) ON THE TONGUE EVERY 6 HOURS AS NEEDED 30 tablet 0     pantoprazole (PROTONIX) 40 MG tablet Take 1 tablet (40 mg total) by mouth once daily. 90 tablet 3     polyethylene glycol (GLYCOLAX) 17 gram/dose powder MIX 17 GRAMS (1 capful) IN LIQUID AND DRINK BY MOUTH TWICE DAILY 1020 g 2     pravastatin (PRAVACHOL) 10 MG tablet Take 1 tablet (10 mg total) by mouth once daily. 90 tablet 1     promethazine (PHENERGAN) 25 MG tablet TAKE 1 TABLET BY MOUTH EVERY 4 HOURS AS NEEDED IF UNRELIEVED BY ZOFRAN 30 tablet 0     psyllium husk, aspartame, (METAMUCIL) 3.4 gram PwPk packet Take 1 packet by mouth once daily. 30 packet 0     ramipriL (ALTACE) 5 MG capsule TAKE 1 CAPSULE(5 MG) BY MOUTH EVERY EVENING 90 capsule 1     SHINGRIX, PF, 50 mcg/0.5 mL injection        tacrolimus (PROGRAF) 0.5 MG Cap Take 1 capsule (0.5 mg total) by mouth every 12 (twelve) hours. 180 capsule 3     valACYclovir (VALTREX) 1000 MG tablet TAKE 2 TABLETS(2000 MG) BY MOUTH TWICE DAILY FOR 2 DOSES 4 tablet 3     venlafaxine (EFFEXOR-XR) 150 MG Cp24 TAKE 1 CAPSULE(150 MG) BY MOUTH EVERY DAY 90 capsule 3        Objective:     Vital Signs (Most Recent):  Temp: 99.2 °F (37.3 °C) (03/01/23 1125)  Pulse: 70 (03/01/23 1125)  Resp: 18 (03/01/23 1125)  BP: (!) 125/58 (03/01/23 1125)  SpO2: 98 % (03/01/23 1125)   Vital Signs (24h Range):  Temp:  [97.3 °F (36.3 °C)-99.2 °F (37.3 °C)] 99.2 °F (37.3 °C)  Pulse:  [62-75] 70  Resp:  [14-18] 18  SpO2:  [97 %-100 %] 98 %  BP: (125-188)/(58-73) 125/58     Weight: 50.8 kg (112 lb) (02/28/23 1712)  Body mass index is 22.62 kg/m².    Physical  Exam  Vitals reviewed. Exam conducted with a chaperone present.   Constitutional:       General: She is not in acute distress.     Appearance: Normal appearance. She is normal weight. She is not ill-appearing, toxic-appearing or diaphoretic.      Comments: Pleasant woman in no acute distress. She is cooperative with examination and attempts to participate in interview.   HENT:      Head: Normocephalic and atraumatic.      Right Ear: There is no impacted cerumen.      Left Ear: There is no impacted cerumen.      Nose: No congestion or rhinorrhea.      Mouth/Throat:      Mouth: Mucous membranes are moist.      Pharynx: Oropharynx is clear. No oropharyngeal exudate or posterior oropharyngeal erythema.   Eyes:      General: No scleral icterus.     Extraocular Movements: Extraocular movements intact.   Cardiovascular:      Rate and Rhythm: Normal rate and regular rhythm.      Pulses: Normal pulses.      Heart sounds: Normal heart sounds. No murmur heard.    No friction rub. No gallop.   Pulmonary:      Effort: Pulmonary effort is normal. No respiratory distress.      Breath sounds: No stridor. No wheezing, rhonchi or rales.   Chest:      Chest wall: No tenderness.   Abdominal:      General: Abdomen is flat. Bowel sounds are normal. There is no distension.      Palpations: There is no mass.      Tenderness: There is no abdominal tenderness. There is no guarding or rebound.      Hernia: No hernia is present.   Musculoskeletal:      Cervical back: Neck supple.      Right lower leg: No edema.      Left lower leg: No edema.   Skin:     General: Skin is warm and dry.   Neurological:      Mental Status: She is alert and oriented to person, place, and time. Mental status is at baseline.      Sensory: No sensory deficit.      Motor: No weakness.      Comments: +asterixis, oriented but slow to respond to questions   Psychiatric:         Mood and Affect: Mood normal.         Behavior: Behavior normal.       MELD-Na score: 10 at  3/1/2023  2:57 AM  MELD score: 10 at 3/1/2023  2:57 AM  Calculated from:  Serum Creatinine: 0.9 mg/dL (Using min of 1 mg/dL) at 3/1/2023  2:57 AM  Serum Sodium: 139 mmol/L (Using max of 137 mmol/L) at 3/1/2023  2:57 AM  Total Bilirubin: 2.2 mg/dL at 3/1/2023  2:57 AM  INR(ratio): 1.1 at 2/28/2023  5:59 PM  Age: 52 years    Significant Labs:  Labs within the past month have been reviewed.    Significant Imaging:  Labs: Reviewed  US: Reviewed  CT: Reviewed    Assessment/Plan:     Neuro  Encephalopathy  - see decompensated cirrhosis    GI  * Decompensated liver disease  Patient with liver cirrhosis due to Von Gierke disease s/p transplant 2002 complicated by graft cirrhosis due to chronic rejection. Decompensation with ascites, hepatic encephalopathy. Ultrasound without significant ascites here but notable for 2.8 cm hypo attenuation. Asterixis noted on examination.      MELD-Na score: 10 at 3/1/2023  2:57 AM  MELD score: 10 at 3/1/2023  2:57 AM  Calculated from:  Serum Creatinine: 0.9 mg/dL (Using min of 1 mg/dL) at 3/1/2023  2:57 AM  Serum Sodium: 139 mmol/L (Using max of 137 mmol/L) at 3/1/2023  2:57 AM  Total Bilirubin: 2.2 mg/dL at 3/1/2023  2:57 AM  INR(ratio): 1.1 at 2/28/2023  5:59 PM  Age: 52 years     Suspect there is likely component of hepatic encephalopathy but patient's symptoms of severe headache and gait imbalance warrant further work up. Of note patient with previous history of chiari malformation and craniotomy.    - continue lactulose, titrate to 3-4 BM daily  - continue rifaximin  - continue tacrolimus 0.5 mg BID  - recommend MRI or CT triple phase to further evaluate liver hypoattenuation        Thank you for your consult. I will follow-up with patient. Please contact us if you have any additional questions.    Darshan Cruz MD  Hepatology  [facility] - Telemetry Stepdown

## 2023-03-01 NOTE — SUBJECTIVE & OBJECTIVE
Review of Systems   Constitutional:  Negative for chills, fatigue and fever.   HENT:  Negative for congestion, postnasal drip, rhinorrhea, sneezing and sore throat.    Eyes:  Negative for photophobia and visual disturbance.   Respiratory:  Negative for cough, choking, chest tightness and shortness of breath.    Cardiovascular:  Negative for chest pain, palpitations and leg swelling.   Gastrointestinal:  Positive for abdominal distention and abdominal pain. Negative for constipation, diarrhea, nausea and vomiting.   Endocrine: Negative for polyphagia and polyuria.   Genitourinary:  Negative for decreased urine volume, difficulty urinating, dysuria and urgency.   Musculoskeletal:  Positive for gait problem. Negative for arthralgias and myalgias.   Skin:  Negative for rash and wound.   Neurological:  Positive for dizziness, tremors, light-headedness and headaches. Negative for seizures and syncope.   Psychiatric/Behavioral:  Positive for confusion.      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    Arnold-Chiari malformation     Depression     Esophageal stricture     Essential tremor     Hypertension     Left bundle branch block     Liver fibrosis, transplanted liver 10/2/2018    Suggested on fibroscan 10/2/18    Migraine without aura     MVP (mitral valve prolapse)     Non-rheumatic mitral regurgitation 10/1/2018    Non-rheumatic tricuspid valve insufficiency 10/1/2018    Osteoporosis     Recurrent urinary tract infection     Seizures     Shingles 2007    SIADH (syndrome of inappropriate ADH production)     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    Tricuspid valve prolapse     Urolithiasis     Von Gierke disease     s/p liver transplant       Past Surgical History:   Procedure Laterality Date    APPENDECTOMY  6/22/2007    APPLICATION OF WOUND VACUUM-ASSISTED CLOSURE DEVICE N/A 9/18/2020    Procedure: APPLICATION, WOUND VAC;  Surgeon: Zain Decker MD;  Location: [facility];  Service: General;  Laterality: N/A;    COLONOSCOPY  5/13/2008    internal hemorrhoids    CRANIOTOMY      ESOPHAGOGASTRODUODENOSCOPY N/A 9/3/2020    Procedure: EGD (ESOPHAGOGASTRODUODENOSCOPY);  Surgeon: Tyrel Vergara MD;  Location: [facility];  Service: Endoscopy;  Laterality: N/A;    EXPLORATORY LAPAROTOMY  2020    due to perforated stomach    LAMINECTOMY  3/2001    LIVER TRANSPLANT  9/23/2002    OSSICULAR RECONSTRUCTION  10/4/1995    RIGHT REPLACEMENT PROSTHESIS for cholesteatoma    THYMECTOMY  5/2/2007    TONSILLECTOMY, ADENOIDECTOMY  1/21/2004    TOTAL ABDOMINAL HYSTERECTOMY  3/31/1994       Family history of liver disease: No    Review of patient's allergies indicates:   Allergen Reactions    Codeine Itching     Other reaction(s): Itching    Lipitor [atorvastatin] Other (See Comments)     Other reaction(s): Muscle pain  Muscle cranmps    Morphine Itching     Other reaction(s): nausea and vomiting     Zoloft [sertraline] Other (See Comments)     Tremors/muscle spasms         Tobacco Use    Smoking status: Never    Smokeless tobacco: Never   Substance and Sexual Activity    Alcohol use: No    Drug use: No    Sexual activity: Not on file       Medications Prior to Admission   Medication Sig Dispense Refill Last Dose    acetaminophen (TYLENOL) 325 MG tablet Take 2 tablets (650 mg total) by mouth every 6 (six) hours as needed. (Patient taking differently: Take 650 mg by mouth 2 (two) times daily as needed (headache).)  0     butalbitaL-acetaminophen  mg Tab Take 1 tablet by mouth 2 (two) times daily as needed (headaches). 60 tablet 3     calcium carbonate (TUMS) 200 mg calcium (500 mg) chewable tablet Take 1 tablet (500 mg total) by mouth 2 (two) times daily as needed. (Patient taking differently: Take 1,500 mg by mouth daily as needed for Heartburn.)       calcium phosphate trib/vit D3 (CALTRATE GUMMY BITES ORAL) Take by mouth.       clonazePAM (KLONOPIN) 0.5 MG tablet Take 1 tablet (0.5 mg total)  by mouth 2 (two) times daily as needed for Anxiety. 60 tablet 3     flu vacc [id number] 6mos up,PF, (FLUARIX QUAD 0251-4883, PF,) 60 mcg (15 mcg x 4)/0.5 mL Syrg Inject 0.5 mLs into the muscle. 0.5 mL 0     FLUCELVAX QUAD [id number], PF, 60 mcg (15 mcg x 4)/0.5 mL Syrg        fluorometholone 0.1% (FML) 0.1 % DrpS Place 2 drops into the left eye 2 (two) times daily.       furosemide (LASIX) 20 MG tablet TAKE 1 TABLET(20 MG) BY MOUTH EVERY DAY 90 tablet 3     hydrocortisone 2.5 % cream Apply topically to affected area twice daily as needed       lactulose (CHRONULAC) 10 gram/15 mL solution Take 30 mLs (20 g total) by mouth 2 (two) times daily. 473 mL 0     levothyroxine (SYNTHROID) 75 MCG tablet TAKE 1 TABLET(75 MCG) BY MOUTH EVERY DAY 90 tablet 3     magnesium oxide (MAG-OX) 400 mg (241.3 mg magnesium) tablet TAKE 1 TABLET(400 MG) BY MOUTH TWICE DAILY 180 tablet 3     multivitamin capsule Take 1 capsule by mouth once daily.       ondansetron (ZOFRAN-ODT) 4 MG TbDL DISSOLVE 1 TABLET(4 MG) ON THE TONGUE EVERY 6 HOURS AS NEEDED 30 tablet 0     pantoprazole (PROTONIX) 40 MG tablet Take 1 tablet (40 mg total) by mouth once daily. 90 tablet 3     polyethylene glycol (GLYCOLAX) 17 gram/dose powder MIX 17 GRAMS (1 capful) IN LIQUID AND DRINK BY MOUTH TWICE DAILY 1020 g 2     pravastatin (PRAVACHOL) 10 MG tablet Take 1 tablet (10 mg total) by mouth once daily. 90 tablet 1     promethazine (PHENERGAN) 25 MG tablet TAKE 1 TABLET BY MOUTH EVERY 4 HOURS AS NEEDED IF UNRELIEVED BY ZOFRAN 30 tablet 0     psyllium husk, aspartame, (METAMUCIL) 3.4 gram PwPk packet Take 1 packet by mouth once daily. 30 packet 0     ramipriL (ALTACE) 5 MG capsule TAKE 1 CAPSULE(5 MG) BY MOUTH EVERY EVENING 90 capsule 1     SHINGRIX, PF, 50 mcg/0.5 mL injection        tacrolimus (PROGRAF) 0.5 MG Cap Take 1 capsule (0.5 mg total) by mouth every 12 (twelve) hours. 180 capsule 3     valACYclovir (VALTREX) 1000 MG tablet TAKE 2 TABLETS(2000 MG) BY MOUTH TWICE  DAILY FOR 2 DOSES 4 tablet 3     venlafaxine (EFFEXOR-XR) 150 MG Cp24 TAKE 1 CAPSULE(150 MG) BY MOUTH EVERY DAY 90 capsule 3        Objective:     Vital Signs (Most Recent):  Temp: 99.2 °F (37.3 °C) (03/01/23 1125)  Pulse: 70 (03/01/23 1125)  Resp: 18 (03/01/23 1125)  BP: (!) 125/58 (03/01/23 1125)  SpO2: 98 % (03/01/23 1125)   Vital Signs (24h Range):  Temp:  [97.3 °F (36.3 °C)-99.2 °F (37.3 °C)] 99.2 °F (37.3 °C)  Pulse:  [62-75] 70  Resp:  [14-18] 18  SpO2:  [97 %-100 %] 98 %  BP: (125-188)/(58-73) 125/58     Weight: 50.8 kg (112 lb) (02/28/23 1712)  Body mass index is 22.62 kg/m².    Physical Exam  Vitals reviewed. Exam conducted with a chaperone present.   Constitutional:       General: She is not in acute distress.     Appearance: Normal appearance. She is normal weight. She is not ill-appearing, toxic-appearing or diaphoretic.      Comments: Pleasant woman in no acute distress. She is cooperative with examination and attempts to participate in interview.   HENT:      Head: Normocephalic and atraumatic.      Right Ear: There is no impacted cerumen.      Left Ear: There is no impacted cerumen.      Nose: No congestion or rhinorrhea.      Mouth/Throat:      Mouth: Mucous membranes are moist.      Pharynx: Oropharynx is clear. No oropharyngeal exudate or posterior oropharyngeal erythema.   Eyes:      General: No scleral icterus.     Extraocular Movements: Extraocular movements intact.   Cardiovascular:      Rate and Rhythm: Normal rate and regular rhythm.      Pulses: Normal pulses.      Heart sounds: Normal heart sounds. No murmur heard.    No friction rub. No gallop.   Pulmonary:      Effort: Pulmonary effort is normal. No respiratory distress.      Breath sounds: No stridor. No wheezing, rhonchi or rales.   Chest:      Chest wall: No tenderness.   Abdominal:      General: Abdomen is flat. Bowel sounds are normal. There is no distension.      Palpations: There is no mass.      Tenderness: There is no abdominal  tenderness. There is no guarding or rebound.      Hernia: No hernia is present.   Musculoskeletal:      Cervical back: Neck supple.      Right lower leg: No edema.      Left lower leg: No edema.   Skin:     General: Skin is warm and dry.   Neurological:      Mental Status: She is alert and oriented to person, place, and time. Mental status is at baseline.      Sensory: No sensory deficit.      Motor: No weakness.      Comments: +asterixis, oriented but slow to respond to questions   Psychiatric:         Mood and Affect: Mood normal.         Behavior: Behavior normal.       MELD-Na score: 10 at 3/1/2023  2:57 AM  MELD score: 10 at 3/1/2023  2:57 AM  Calculated from:  Serum Creatinine: 0.9 mg/dL (Using min of 1 mg/dL) at 3/1/2023  2:57 AM  Serum Sodium: 139 mmol/L (Using max of 137 mmol/L) at 3/1/2023  2:57 AM  Total Bilirubin: 2.2 mg/dL at 3/1/2023  2:57 AM  INR(ratio): 1.1 at 2/28/2023  5:59 PM  Age: 52 years    Significant Labs:  Labs within the past month have been reviewed.    Significant Imaging:  Labs: Reviewed  US: Reviewed  CT: Reviewed

## 2023-03-01 NOTE — SUBJECTIVE & OBJECTIVE
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    Arnold-Chiari malformation     Depression     Esophageal stricture     Essential tremor     Hypertension     Left bundle branch block     Liver fibrosis, transplanted liver 10/2/2018    Suggested on fibroscan 10/2/18    Migraine without aura     MVP (mitral valve prolapse)     Non-rheumatic mitral regurgitation 10/1/2018    Non-rheumatic tricuspid valve insufficiency 10/1/2018    Osteoporosis     Recurrent urinary tract infection     Seizures     Shingles 2007    SIADH (syndrome of inappropriate ADH production)     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    Tricuspid valve prolapse     Urolithiasis     Von Gierke disease     s/p liver transplant       Past Surgical History:   Procedure Laterality Date    APPENDECTOMY  6/22/2007    APPLICATION OF WOUND VACUUM-ASSISTED CLOSURE DEVICE N/A 9/18/2020    Procedure: APPLICATION, WOUND VAC;  Surgeon: Zain Decker MD;  Location: [address];  Service: General;  Laterality: N/A;    COLONOSCOPY  5/13/2008    internal hemorrhoids    CRANIOTOMY      ESOPHAGOGASTRODUODENOSCOPY N/A 9/3/2020    Procedure: EGD (ESOPHAGOGASTRODUODENOSCOPY);  Surgeon: Tyrel Vergara MD;  Location: [facility];  Service: Endoscopy;  Laterality: N/A;    EXPLORATORY LAPAROTOMY  2020    due to perforated stomach    LAMINECTOMY  3/2001    LIVER TRANSPLANT  9/23/2002    OSSICULAR RECONSTRUCTION  10/4/1995    RIGHT REPLACEMENT PROSTHESIS for cholesteatoma    THYMECTOMY  5/2/2007    TONSILLECTOMY, ADENOIDECTOMY  1/21/2004    TOTAL ABDOMINAL HYSTERECTOMY  3/31/1994       Review of patient's allergies indicates:   Allergen Reactions    Codeine Itching     Other reaction(s): Itching    Lipitor [atorvastatin] Other (See Comments)     Other reaction(s): Muscle pain  Muscle cranmps    Morphine Itching     Other reaction(s): nausea and vomiting     Zoloft [sertraline] Other (See Comments)     Tremors/muscle spasms       No  current facility-administered medications on file prior to encounter.     Current Outpatient Medications on File Prior to Encounter   Medication Sig    acetaminophen (TYLENOL) 325 MG tablet Take 2 tablets (650 mg total) by mouth every 6 (six) hours as needed. (Patient taking differently: Take 650 mg by mouth 2 (two) times daily as needed (headache).)    butalbitaL-acetaminophen  mg Tab Take 1 tablet by mouth 2 (two) times daily as needed (headaches).    calcium carbonate (TUMS) 200 mg calcium (500 mg) chewable tablet Take 1 tablet (500 mg total) by mouth 2 (two) times daily as needed. (Patient taking differently: Take 1,500 mg by mouth daily as needed for Heartburn.)    calcium phosphate trib/vit D3 (CALTRATE GUMMY BITES ORAL) Take by mouth.    clonazePAM (KLONOPIN) 0.5 MG tablet Take 1 tablet (0.5 mg total) by mouth 2 (two) times daily as needed for Anxiety.    flu vacc [id number] 6mos up,PF, (FLUARIX QUAD [id number], PF,) 60 mcg (15 mcg x 4)/0.5 mL Syrg Inject 0.5 mLs into the muscle.    FLUCELVAX QUAD [id number], PF, 60 mcg (15 mcg x 4)/0.5 mL Syrg     fluorometholone 0.1% (FML) 0.1 % DrpS Place 2 drops into the left eye 2 (two) times daily.    furosemide (LASIX) 20 MG tablet TAKE 1 TABLET(20 MG) BY MOUTH EVERY DAY    hydrocortisone 2.5 % cream Apply topically to affected area twice daily as needed    lactulose (CHRONULAC) 10 gram/15 mL solution Take 30 mLs (20 g total) by mouth 2 (two) times daily.    levothyroxine (SYNTHROID) 75 MCG tablet TAKE 1 TABLET(75 MCG) BY MOUTH EVERY DAY    magnesium oxide (MAG-OX) 400 mg (241.3 mg magnesium) tablet TAKE 1 TABLET(400 MG) BY MOUTH TWICE DAILY    multivitamin capsule Take 1 capsule by mouth once daily.    ondansetron (ZOFRAN-ODT) 4 MG TbDL DISSOLVE 1 TABLET(4 MG) ON THE TONGUE EVERY 6 HOURS AS NEEDED    pantoprazole (PROTONIX) 40 MG tablet Take 1 tablet (40 mg total) by mouth once daily.    polyethylene glycol (GLYCOLAX) 17 gram/dose powder MIX 17 GRAMS (1 capful) IN  LIQUID AND DRINK BY MOUTH TWICE DAILY    pravastatin (PRAVACHOL) 10 MG tablet Take 1 tablet (10 mg total) by mouth once daily.    promethazine (PHENERGAN) 25 MG tablet TAKE 1 TABLET BY MOUTH EVERY 4 HOURS AS NEEDED IF UNRELIEVED BY ZOFRAN    psyllium husk, aspartame, (METAMUCIL) 3.4 gram PwPk packet Take 1 packet by mouth once daily.    ramipriL (ALTACE) 5 MG capsule TAKE 1 CAPSULE(5 MG) BY MOUTH EVERY EVENING    SHINGRIX, PF, 50 mcg/0.5 mL injection     tacrolimus (PROGRAF) 0.5 MG Cap Take 1 capsule (0.5 mg total) by mouth every 12 (twelve) hours.    valACYclovir (VALTREX) 1000 MG tablet TAKE 2 TABLETS(2000 MG) BY MOUTH TWICE DAILY FOR 2 DOSES    venlafaxine (EFFEXOR-XR) 150 MG Cp24 TAKE 1 CAPSULE(150 MG) BY MOUTH EVERY DAY     Family History       Problem Relation (Age of Onset)    Heart disease Mother    No Known Problems Father    Stroke Mother          Tobacco Use    Smoking status: Never    Smokeless tobacco: Never   Substance and Sexual Activity    Alcohol use: No    Drug use: No    Sexual activity: Not on file     Review of Systems   Constitutional:  Negative for chills, fatigue and fever.   HENT:  Positive for sore throat. Negative for congestion, postnasal drip, rhinorrhea and sneezing.    Eyes:  Negative for photophobia and visual disturbance.   Respiratory:  Negative for cough, choking, chest tightness and shortness of breath.    Cardiovascular:  Negative for chest pain, palpitations and leg swelling.   Gastrointestinal:  Positive for abdominal distention and abdominal pain. Negative for constipation, diarrhea, nausea and vomiting.   Endocrine: Negative for polyphagia and polyuria.   Genitourinary:  Negative for decreased urine volume, difficulty urinating, dysuria and urgency.   Musculoskeletal:  Positive for gait problem. Negative for arthralgias and myalgias.   Skin:  Negative for rash and wound.   Neurological:  Positive for dizziness, tremors, light-headedness and headaches. Negative for seizures  and syncope.   Psychiatric/Behavioral:  Positive for confusion.    Objective:     Vital Signs (Most Recent):  Temp: 97.9 °F (36.6 °C) (02/28/23 1712)  Pulse: 63 (02/28/23 1712)  Resp: 18 (02/28/23 1712)  BP: 139/63 (02/28/23 1712)  SpO2: 99 % (02/28/23 2000) Vital Signs (24h Range):  Temp:  [97.9 °F (36.6 °C)] 97.9 °F (36.6 °C)  Pulse:  [63] 63  Resp:  [18] 18  SpO2:  [98 %-99 %] 99 %  BP: (139)/(63) 139/63     Weight: 50.8 kg (112 lb)  Body mass index is 22.62 kg/m².    Physical Exam  Vitals reviewed. Exam conducted with a chaperone present.   Constitutional:       General: She is not in acute distress.     Appearance: Normal appearance. She is normal weight. She is not ill-appearing, toxic-appearing or diaphoretic.      Comments: Pleasant woman in no acute distress. She is cooperative with examination and attempts to participate in interview.   HENT:      Head: Normocephalic and atraumatic.      Right Ear: There is no impacted cerumen.      Left Ear: There is no impacted cerumen.      Nose: No congestion or rhinorrhea.      Mouth/Throat:      Mouth: Mucous membranes are moist.      Pharynx: Oropharynx is clear. No oropharyngeal exudate or posterior oropharyngeal erythema.   Eyes:      General: No scleral icterus.     Extraocular Movements: Extraocular movements intact.   Neck:      Comments: Small, nonmobile mass palpated in left lateral portion of the patient's neck  Cardiovascular:      Rate and Rhythm: Normal rate and regular rhythm.      Pulses: Normal pulses.      Heart sounds: Normal heart sounds. No murmur heard.    No friction rub. No gallop.   Pulmonary:      Effort: Pulmonary effort is normal. No respiratory distress.      Breath sounds: No stridor. No wheezing, rhonchi or rales.   Chest:      Chest wall: No tenderness.   Abdominal:      General: Abdomen is flat. Bowel sounds are normal. There is no distension.      Palpations: There is no mass.      Tenderness: There is no abdominal tenderness. There is  no guarding or rebound.      Hernia: No hernia is present.   Musculoskeletal:      Cervical back: Neck supple.      Right lower leg: No edema.      Left lower leg: No edema.   Skin:     General: Skin is warm and dry.   Neurological:      Mental Status: She is alert. Mental status is at baseline. She is disoriented.      Sensory: No sensory deficit.      Motor: No weakness.      Comments: Patient with flapping tremor noted with hands outstretched   Psychiatric:         Mood and Affect: Mood normal.         Behavior: Behavior normal.           Significant Labs: All pertinent labs within the past 24 hours have been reviewed.  CBC:   Recent Labs   Lab 02/28/23  1759 02/28/23  1830   WBC 3.20*  --    HGB 12.3  --    HCT 36.6* 32*   PLT 54*  --      CMP:   Recent Labs   Lab 02/28/23  1759      K 3.3*      CO2 25   GLU 73   BUN 14   CREATININE 0.9   CALCIUM 8.9   PROT 7.1   ALBUMIN 3.1*   BILITOT 2.3*   ALKPHOS 582*   AST 77*   ALT 52*   ANIONGAP 11       Significant Imaging: I have reviewed all pertinent imaging results/findings within the past 24 hours.

## 2023-03-01 NOTE — NURSING
Pt crying with increased pain, pulling hair, thrashing about in bed, threatening to remove IV. Requiring increased redirection from staff et  to prevent removing lines. Ref to take po meds for pain or anxiety. Reached out to hepatology and attending. Awaiting new orders

## 2023-03-01 NOTE — ASSESSMENT & PLAN NOTE
Patient used to take demeclocycline at home. Patient's sodium level within normal limits. Will continue to trend.

## 2023-03-01 NOTE — PLAN OF CARE
[facility] - Telemetry Stepdown  Initial Discharge Assessment       Primary Care Provider: David Lorenzo MD    Admission Diagnosis: Hepatic encephalopathy [K76.82]  Confusion [R41.0]  Chest pain [R07.9]  Decompensated liver disease [K74.69]  AMS (altered mental status) [R41.82]    Admission Date: 2/28/2023  Expected Discharge Date: 3/3/2023    Discharge Barriers Identified: (P) None    Payor: BCBS [place] MEDICARE / Plan: BCBS [employer] [place] / Product Type: Medicare Advantage /     Extended Emergency Contact Information  Primary Emergency Contact: Kody Hernandez  Address: [facility] [place], [place] [address] [place] [address]  Home Phone: [phone]  Work Phone: [phone]  Mobile Phone: [phone]  Relation: Spouse    Discharge Plan A: (P) Home with family  Discharge Plan B: (P) Home Health      [id number] [place] [address] AT [facility] [address] [place] [id number]  Phone: [phone] Fax: [phone]      Initial Assessment (most recent)       Adult Discharge Assessment - 03/01/23 1515          Discharge Assessment    Assessment Type Discharge Planning Assessment (P)      Confirmed/corrected address, phone number and insurance Yes (P)      Confirmed Demographics Correct on Facesheet (P)      Source of Information patient;family (P)      When was your last doctors appointment? -- (P)    2 weeks ago    Communicated MARILEE with patient/caregiver Date not available/Unable to determine (P)      Reason For Admission See admit diagnosis (P)      People in Home child(stanley), adult;child(stanley), dependent;spouse (P)      Facility Arrived From: home (P)      Do you expect to return to your current living situation? Yes (P)      Do you have help at home or someone to help you manage your care at home? Yes (P)      Who are your caregiver(s) and their phone number(s)? Kody Hernandez -  - [phone] (P)      Prior to  hospitilization cognitive status: Unable to Assess (P)      Current cognitive status: -- (P)    AMS    Walking or Climbing Stairs ambulation difficulty, assistance 1 person (P)      Mobility Management only recently needs assistance (P)      Dressing/Bathing bathing difficulty, assistance 1 person (P)      Dressing/Bathing Management has grab bars in the bathroom (P)      Equipment Currently Used at Home walker, rolling;wheelchair (P)      Readmission within 30 days? No (P)      Patient currently being followed by outpatient case management? No (P)      Do you currently have service(s) that help you manage your care at home? No (P)      Do you take prescription medications? Yes (P)      Do you have prescription coverage? Yes (P)      Coverage BCBS Managed Medicare (P)      Do you have any problems affording any of your prescribed medications? TBD (P)    Pt has a high deductible    Who is going to help you get home at discharge? Kody Guzman  - [phone] (P)      How do you get to doctors appointments? family or friend will provide (P)      Are you on dialysis? No (P)      Do you take coumadin? No (P)      Discharge Plan A Home with family (P)      Discharge Plan B Home Health (P)      DME Needed Upon Discharge  other (see comments) (P)    TBD    Discharge Plan discussed with: Spouse/sig other (P)      Name(s) and Number(s) Kody ernandez - [phone] (P)      Discharge Barriers Identified None (P)         OTHER    Name(s) of People in Home Kody ernandez - [phone] (P)                            met with patient and her  to discuss SW role and to assess for needs at discharge.  They agreed to SW interview.  Verified contact information, address, and insurance from [employer].   Patient reported living with her  and her children (11 yr old boy and 19 yr old girl).  Prior to hospital admission, patient was not driving, needed some assistance  with ADL's,  and sometimes uses assistive devices for mobility. Pt and her  also stated that the pt does not attend a coumadin clinic and is not on dialysis.  Transportation home upon discharge from the hospital will be provided by the pt's .  Pt used HH 2.5 years ago, but has not used it recently.     Durable medical equipment:   Wheelchair (as needed)  walker (as needed)    All questions addressed.  Case management will continue to follow and assist with discharge needs as needed.    Therese Pringle LCSW  PRN   [facility]  03/01/2023

## 2023-03-01 NOTE — PLAN OF CARE
Per Allison Yeager, NP no fluid seen via ultrasound to safely proceed with paracentesis. Pt tolerated well; NAD noted. Pt awaiting on transport back to room 8072. Report called to PEDRO LUIS García.

## 2023-03-01 NOTE — H&P
Inpatient Radiology Pre-procedure Note    History of Present Illness:  Elda Hernandez is a 52 y.o. female who presents for ultrasound guided paracentesis.  Admission H&P reviewed.  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    Arnold-Chiari malformation     Depression     Esophageal stricture     Essential tremor     Hypertension     Left bundle branch block     Liver fibrosis, transplanted liver 10/2/2018    Suggested on fibroscan 10/2/18    Migraine without aura     MVP (mitral valve prolapse)     Non-rheumatic mitral regurgitation 10/1/2018    Non-rheumatic tricuspid valve insufficiency 10/1/2018    Osteoporosis     Recurrent urinary tract infection     Seizures     Shingles 2007    SIADH (syndrome of inappropriate ADH production)     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    Tricuspid valve prolapse     Urolithiasis     Von Gierke disease     s/p liver transplant     Past Surgical History:   Procedure Laterality Date    APPENDECTOMY  6/22/2007    APPLICATION OF WOUND VACUUM-ASSISTED CLOSURE DEVICE N/A 9/18/2020    Procedure: APPLICATION, WOUND VAC;  Surgeon: Zain Decker MD;  Location: [facility] OR [address] [place] [address];  Service: General;  Laterality: N/A;    COLONOSCOPY  5/13/2008    internal hemorrhoids    CRANIOTOMY      ESOPHAGOGASTRODUODENOSCOPY N/A 9/3/2020    Procedure: EGD (ESOPHAGOGASTRODUODENOSCOPY);  Surgeon: Tyrel Vergara MD;  Location: [facility];  Service: Endoscopy;  Laterality: N/A;    EXPLORATORY LAPAROTOMY  2020    due to perforated stomach    LAMINECTOMY  3/2001    LIVER TRANSPLANT  9/23/2002    OSSICULAR RECONSTRUCTION  10/4/1995    RIGHT REPLACEMENT PROSTHESIS for cholesteatoma    THYMECTOMY  5/2/2007    TONSILLECTOMY, ADENOIDECTOMY  1/21/2004    TOTAL ABDOMINAL HYSTERECTOMY  3/31/1994       Review of Systems:   As documented in primary team H&P    Home Meds:   Prior to Admission medications    Medication Sig Start Date End Date Taking? Authorizing Provider    acetaminophen (TYLENOL) 325 MG tablet Take 2 tablets (650 mg total) by mouth every 6 (six) hours as needed.  Patient taking differently: Take 650 mg by mouth 2 (two) times daily as needed (headache). 11/19/20   Joel Vasquez MD   butalbitaL-acetaminophen  mg Tab Take 1 tablet by mouth 2 (two) times daily as needed (headaches). 2/14/23 3/16/23  David Lorenzo MD   calcium carbonate (TUMS) 200 mg calcium (500 mg) chewable tablet Take 1 tablet (500 mg total) by mouth 2 (two) times daily as needed.  Patient taking differently: Take 1,500 mg by mouth daily as needed for Heartburn. 11/19/20 2/14/23  Joel Vasquez MD   calcium phosphate trib/vit D3 (CALTRATE GUMMY BITES ORAL) Take by mouth.    Historical Provider   clonazePAM (KLONOPIN) 0.5 MG tablet Take 1 tablet (0.5 mg total) by mouth 2 (two) times daily as needed for Anxiety. 2/14/23   David Lorenzo MD   flu vacc [id number] 6mos up,PF, (FLUARIX QUAD 0770-3280, PF,) 60 mcg (15 mcg x 4)/0.5 mL Syrg Inject 0.5 mLs into the muscle. 12/2/20   Tyrese Randle, PharmD   FLUCELVAX QUAD [id number], PF, 60 mcg (15 mcg x 4)/0.5 mL Syrg  11/12/22   Historical Provider   fluorometholone 0.1% (FML) 0.1 % DrpS Place 2 drops into the left eye 2 (two) times daily.    Historical Provider   furosemide (LASIX) 20 MG tablet TAKE 1 TABLET(20 MG) BY MOUTH EVERY DAY 12/16/22   David Lorenzo MD   hydrocortisone 2.5 % cream Apply topically to affected area twice daily as needed    Historical Provider   lactulose (CHRONULAC) 10 gram/15 mL solution Take 30 mLs (20 g total) by mouth 2 (two) times daily. 2/15/23   David Lorenzo MD   levothyroxine (SYNTHROID) 75 MCG tablet TAKE 1 TABLET(75 MCG) BY MOUTH EVERY DAY 7/24/22   David Lorenzo MD   magnesium oxide (MAG-OX) 400 mg (241.3 mg magnesium) tablet TAKE 1 TABLET(400 MG) BY MOUTH TWICE DAILY 2/14/23   David Lorenzo MD   multivitamin capsule Take 1 capsule by mouth once daily.    Historical Provider   ondansetron (ZOFRAN-ODT) 4  MG TbDL DISSOLVE 1 TABLET(4 MG) ON THE TONGUE EVERY 6 HOURS AS NEEDED 11/10/22   David Lorenzo MD   pantoprazole (PROTONIX) 40 MG tablet Take 1 tablet (40 mg total) by mouth once daily. 2/3/22   David Lorenzo MD   polyethylene glycol (GLYCOLAX) 17 gram/dose powder MIX 17 GRAMS (1 capful) IN LIQUID AND DRINK BY MOUTH TWICE DAILY 3/27/22   Wilder Canada MD   pravastatin (PRAVACHOL) 10 MG tablet Take 1 tablet (10 mg total) by mouth once daily. 7/28/22 7/28/23  Essie Frank MD   promethazine (PHENERGAN) 25 MG tablet TAKE 1 TABLET BY MOUTH EVERY 4 HOURS AS NEEDED IF UNRELIEVED BY ZOFRAN 2/12/23   David Lorenzo MD   psyllium husk, aspartame, (METAMUCIL) 3.4 gram PwPk packet Take 1 packet by mouth once daily. 3/27/22   Wilder Canada MD   ramipriL (ALTACE) 5 MG capsule TAKE 1 CAPSULE(5 MG) BY MOUTH EVERY EVENING 2/10/23   Essie Frank MD   SHINGRIX, PF, 50 mcg/0.5 mL injection  11/12/22   Historical Provider   tacrolimus (PROGRAF) 0.5 MG Cap Take 1 capsule (0.5 mg total) by mouth every 12 (twelve) hours. 11/15/22   Jaya Nielsen MD   valACYclovir (VALTREX) 1000 MG tablet TAKE 2 TABLETS(2000 MG) BY MOUTH TWICE DAILY FOR 2 DOSES 1/28/22   David Lorenzo MD   venlafaxine (EFFEXOR-XR) 150 MG Cp24 TAKE 1 CAPSULE(150 MG) BY MOUTH EVERY DAY 12/21/21   David Lorenzo MD     Scheduled Meds:    furosemide (LASIX) injection  40 mg Intravenous Daily    lactulose  20 g Oral TID    levothyroxine  75 mcg Oral Before breakfast    lisinopriL  20 mg Oral Daily    pantoprazole  40 mg Oral Daily    pravastatin  10 mg Oral QHS    rifAXImin  550 mg Oral BID    tacrolimus  0.5 mg Oral BID    venlafaxine  150 mg Oral Daily     Continuous Infusions:   PRN Meds:acetaminophen, albuterol-ipratropium, dextrose 10%, dextrose 10%, dicyclomine, glucagon (human recombinant), melatonin, naloxone, ondansetron, promethazine, simethicone, sodium chloride 0.9%  Anticoagulants/Antiplatelets: no anticoagulation    Allergies:   Review of patient's  allergies indicates:   Allergen Reactions    Codeine Itching     Other reaction(s): Itching    Lipitor [atorvastatin] Other (See Comments)     Other reaction(s): Muscle pain  Muscle cranmps    Morphine Itching     Other reaction(s): nausea and vomiting     Zoloft [sertraline] Other (See Comments)     Tremors/muscle spasms     Sedation Hx: have not been any systemic reactions    Vitals:  Temp: 98.1 °F (36.7 °C) (03/01/23 0800)  Pulse: 69 (03/01/23 0825)  Resp: 14 (03/01/23 0825)  BP: (!) 152/67 (03/01/23 0825)  SpO2: 100 % (03/01/23 0825)     Physical Exam:  ASA: 3  Mallampati: n/a    General: no acute distress  Mental Status: alert and oriented to person, place and time  HEENT: normocephalic, atraumatic  Chest: unlabored breathing  Heart: regular heart rate  Abdomen: distended  Extremity: moves all extremities    Plan: ultrasound guided paracentesis  Sedation Plan: local    AVNI Wilkins, MORRISP  Interventional Radiology  [phone] [facility]

## 2023-03-01 NOTE — PLAN OF CARE
SW attempted to complete the pt's assessment. Pt did not want to complete it at that time as she was not felling well.  She was agreeable to the SW contacting her .  SW contacted her , but had to leave a vm. SW will f/u at a later time.    Therese Pringle, SHERIW   PRN

## 2023-03-01 NOTE — NURSING
Patient AAOx4, able to make needs known to staff. Anxiety noted, Pt. C/o 10/10 headache and ABD pain, Med Team L notified and one time dose of hydromorphone admin per orders. ABD distention/ ascites noted,pale skin, AROM to all extremities. Bed alarm set, r/t generalized weakness. Patient being closely monitored. Appears to have rested well after admin of pain management meds.

## 2023-03-01 NOTE — PLAN OF CARE
Pt arrived to MPU room 1 for para, no acute distress noted. Orders and labs reviewed on chart. Awaiting consent.    Allison Yeager NP aware pt is AOx3.

## 2023-03-02 PROBLEM — F32.A DEPRESSION: Status: ACTIVE | Noted: 2023-03-02

## 2023-03-02 PROBLEM — R27.0 ATAXIA: Status: ACTIVE | Noted: 2023-03-02

## 2023-03-02 LAB
ALBUMIN SERPL BCP-MCNC: 2.3 G/DL (ref 3.5–5.2)
ALP SERPL-CCNC: 451 U/L (ref 55–135)
ALT SERPL W/O P-5'-P-CCNC: 34 U/L (ref 10–44)
ANION GAP SERPL CALC-SCNC: 8 MMOL/L (ref 8–16)
AST SERPL-CCNC: 56 U/L (ref 10–40)
BILIRUB SERPL-MCNC: 1.4 MG/DL (ref 0.1–1)
BUN SERPL-MCNC: 22 MG/DL (ref 6–20)
CALCIUM SERPL-MCNC: 8.3 MG/DL (ref 8.7–10.5)
CHLORIDE SERPL-SCNC: 104 MMOL/L (ref 95–110)
CO2 SERPL-SCNC: 21 MMOL/L (ref 23–29)
CREAT SERPL-MCNC: 1.2 MG/DL (ref 0.5–1.4)
ERYTHROCYTE [DISTWIDTH] IN BLOOD BY AUTOMATED COUNT: 15.1 % (ref 11.5–14.5)
EST. GFR  (NO RACE VARIABLE): 53.8 ML/MIN/1.73 M^2
FOLATE SERPL-MCNC: 14 NG/ML (ref 4–24)
GLUCOSE SERPL-MCNC: 89 MG/DL (ref 70–110)
HCT VFR BLD AUTO: 29.7 % (ref 37–48.5)
HGB BLD-MCNC: 9.8 G/DL (ref 12–16)
INR PPP: 1.1 (ref 0.8–1.2)
MAGNESIUM SERPL-MCNC: 1.8 MG/DL (ref 1.6–2.6)
MCH RBC QN AUTO: 30.5 PG (ref 27–31)
MCHC RBC AUTO-ENTMCNC: 33 G/DL (ref 32–36)
MCV RBC AUTO: 93 FL (ref 82–98)
PLATELET # BLD AUTO: 66 K/UL (ref 150–450)
PMV BLD AUTO: 12.1 FL (ref 9.2–12.9)
POTASSIUM SERPL-SCNC: 3.9 MMOL/L (ref 3.5–5.1)
PROT SERPL-MCNC: 5.3 G/DL (ref 6–8.4)
PROTHROMBIN TIME: 11.8 SEC (ref 9–12.5)
RBC # BLD AUTO: 3.21 M/UL (ref 4–5.4)
SODIUM SERPL-SCNC: 133 MMOL/L (ref 136–145)
TACROLIMUS BLD-MCNC: 5.9 NG/ML (ref 5–15)
VIT B12 SERPL-MCNC: >2000 PG/ML (ref 210–950)
WBC # BLD AUTO: 3.19 K/UL (ref 3.9–12.7)

## 2023-03-02 PROCEDURE — 99900031 HC PATIENT EDUCATION (STAT)

## 2023-03-02 PROCEDURE — 82607 VITAMIN B-12: CPT | Performed by: HOSPITALIST

## 2023-03-02 PROCEDURE — 82746 ASSAY OF FOLIC ACID SERUM: CPT | Performed by: HOSPITALIST

## 2023-03-02 PROCEDURE — 20600001 HC STEP DOWN PRIVATE ROOM

## 2023-03-02 PROCEDURE — 99900035 HC TECH TIME PER 15 MIN (STAT)

## 2023-03-02 PROCEDURE — 80053 COMPREHEN METABOLIC PANEL: CPT | Performed by: STUDENT IN AN ORGANIZED HEALTH CARE EDUCATION/TRAINING PROGRAM

## 2023-03-02 PROCEDURE — 80197 ASSAY OF TACROLIMUS: CPT | Performed by: STUDENT IN AN ORGANIZED HEALTH CARE EDUCATION/TRAINING PROGRAM

## 2023-03-02 PROCEDURE — 63600175 PHARM REV CODE 636 W HCPCS: Performed by: HOSPITALIST

## 2023-03-02 PROCEDURE — 97162 PT EVAL MOD COMPLEX 30 MIN: CPT

## 2023-03-02 PROCEDURE — 84425 ASSAY OF VITAMIN B-1: CPT | Performed by: HOSPITALIST

## 2023-03-02 PROCEDURE — 85610 PROTHROMBIN TIME: CPT | Performed by: STUDENT IN AN ORGANIZED HEALTH CARE EDUCATION/TRAINING PROGRAM

## 2023-03-02 PROCEDURE — 63600175 PHARM REV CODE 636 W HCPCS: Performed by: STUDENT IN AN ORGANIZED HEALTH CARE EDUCATION/TRAINING PROGRAM

## 2023-03-02 PROCEDURE — 25000003 PHARM REV CODE 250: Performed by: STUDENT IN AN ORGANIZED HEALTH CARE EDUCATION/TRAINING PROGRAM

## 2023-03-02 PROCEDURE — 97165 OT EVAL LOW COMPLEX 30 MIN: CPT

## 2023-03-02 PROCEDURE — 99223 PR INITIAL HOSPITAL CARE,LEVL III: ICD-10-PCS | Mod: ,,, | Performed by: PSYCHIATRY & NEUROLOGY

## 2023-03-02 PROCEDURE — 97535 SELF CARE MNGMENT TRAINING: CPT

## 2023-03-02 PROCEDURE — 99232 PR SUBSEQUENT HOSPITAL CARE,LEVL II: ICD-10-PCS | Mod: ,,, | Performed by: STUDENT IN AN ORGANIZED HEALTH CARE EDUCATION/TRAINING PROGRAM

## 2023-03-02 PROCEDURE — 94761 N-INVAS EAR/PLS OXIMETRY MLT: CPT

## 2023-03-02 PROCEDURE — 97530 THERAPEUTIC ACTIVITIES: CPT

## 2023-03-02 PROCEDURE — 83735 ASSAY OF MAGNESIUM: CPT | Performed by: STUDENT IN AN ORGANIZED HEALTH CARE EDUCATION/TRAINING PROGRAM

## 2023-03-02 PROCEDURE — 36415 COLL VENOUS BLD VENIPUNCTURE: CPT | Performed by: HOSPITALIST

## 2023-03-02 PROCEDURE — 85027 COMPLETE CBC AUTOMATED: CPT | Performed by: STUDENT IN AN ORGANIZED HEALTH CARE EDUCATION/TRAINING PROGRAM

## 2023-03-02 PROCEDURE — 25000003 PHARM REV CODE 250: Performed by: HOSPITALIST

## 2023-03-02 PROCEDURE — 99232 SBSQ HOSP IP/OBS MODERATE 35: CPT | Mod: ,,, | Performed by: STUDENT IN AN ORGANIZED HEALTH CARE EDUCATION/TRAINING PROGRAM

## 2023-03-02 PROCEDURE — 99223 1ST HOSP IP/OBS HIGH 75: CPT | Mod: ,,, | Performed by: PSYCHIATRY & NEUROLOGY

## 2023-03-02 RX ORDER — BUTALBITAL, ACETAMINOPHEN AND CAFFEINE 50; 325; 40 MG/1; MG/1; MG/1
1 TABLET ORAL EVERY 4 HOURS PRN
Status: DISCONTINUED | OUTPATIENT
Start: 2023-03-02 | End: 2023-03-06

## 2023-03-02 RX ORDER — ACETAMINOPHEN 325 MG/1
650 TABLET ORAL EVERY 8 HOURS PRN
Status: DISCONTINUED | OUTPATIENT
Start: 2023-03-02 | End: 2023-03-06 | Stop reason: HOSPADM

## 2023-03-02 RX ORDER — GADOBUTROL 604.72 MG/ML
10 INJECTION INTRAVENOUS
Status: COMPLETED | OUTPATIENT
Start: 2023-03-03 | End: 2023-03-02

## 2023-03-02 RX ORDER — SUMATRIPTAN 50 MG/1
100 TABLET, FILM COATED ORAL ONCE
Status: DISCONTINUED | OUTPATIENT
Start: 2023-03-02 | End: 2023-03-03

## 2023-03-02 RX ORDER — HYDROMORPHONE HYDROCHLORIDE 1 MG/ML
0.5 INJECTION, SOLUTION INTRAMUSCULAR; INTRAVENOUS; SUBCUTANEOUS EVERY 6 HOURS PRN
Status: DISCONTINUED | OUTPATIENT
Start: 2023-03-02 | End: 2023-03-06

## 2023-03-02 RX ADMIN — TACROLIMUS 0.5 MG: 0.5 CAPSULE ORAL at 09:03

## 2023-03-02 RX ADMIN — LACTULOSE 20 G: 20 SOLUTION ORAL at 09:03

## 2023-03-02 RX ADMIN — TACROLIMUS 0.5 MG: 0.5 CAPSULE ORAL at 06:03

## 2023-03-02 RX ADMIN — GADOBUTROL 10 ML: 604.72 INJECTION INTRAVENOUS at 11:03

## 2023-03-02 RX ADMIN — LACTULOSE 20 G: 20 SOLUTION ORAL at 03:03

## 2023-03-02 RX ADMIN — BUTALBITAL, ACETAMINOPHEN, AND CAFFEINE 1 TABLET: 325; 50; 40 TABLET ORAL at 07:03

## 2023-03-02 RX ADMIN — PRAVASTATIN SODIUM 10 MG: 10 TABLET ORAL at 09:03

## 2023-03-02 RX ADMIN — PANTOPRAZOLE SODIUM 40 MG: 40 TABLET, DELAYED RELEASE ORAL at 09:03

## 2023-03-02 RX ADMIN — LEVOTHYROXINE SODIUM 75 MCG: 75 TABLET ORAL at 05:03

## 2023-03-02 RX ADMIN — ONDANSETRON 4 MG: 2 INJECTION INTRAMUSCULAR; INTRAVENOUS at 06:03

## 2023-03-02 RX ADMIN — OXYCODONE 5 MG: 5 TABLET ORAL at 01:03

## 2023-03-02 RX ADMIN — HYDROMORPHONE HYDROCHLORIDE 1 MG: 1 INJECTION, SOLUTION INTRAMUSCULAR; INTRAVENOUS; SUBCUTANEOUS at 03:03

## 2023-03-02 RX ADMIN — RIFAXIMIN 550 MG: 550 TABLET ORAL at 09:03

## 2023-03-02 RX ADMIN — VENLAFAXINE HYDROCHLORIDE 150 MG: 150 CAPSULE, EXTENDED RELEASE ORAL at 09:03

## 2023-03-02 RX ADMIN — HYDROMORPHONE HYDROCHLORIDE 0.5 MG: 1 INJECTION, SOLUTION INTRAMUSCULAR; INTRAVENOUS; SUBCUTANEOUS at 10:03

## 2023-03-02 RX ADMIN — HYDROMORPHONE HYDROCHLORIDE 1 MG: 1 INJECTION, SOLUTION INTRAMUSCULAR; INTRAVENOUS; SUBCUTANEOUS at 09:03

## 2023-03-02 RX ADMIN — HYDROMORPHONE HYDROCHLORIDE 0.5 MG: 1 INJECTION, SOLUTION INTRAMUSCULAR; INTRAVENOUS; SUBCUTANEOUS at 04:03

## 2023-03-02 RX ADMIN — LISINOPRIL 20 MG: 20 TABLET ORAL at 09:03

## 2023-03-02 NOTE — HOSPITAL COURSE
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 Asterixis noted on examination. Mildly elevated LFTs. Started on lactulose and rifaximin with improvement. Per IR, During ultrasound evaluation, no ascites identified for safe paracentesis.  Liver ultrasound with Doppler without evidence of portal venous thrombosis and without significant ascites but notable for 2.8 cm hypo attenuation.  MRI brain W WO with subtle findings concerning for hepatic encephalopathy though other toxic or metabolic derangement or encephalitis could have similar appearance; no acute process.  Also notable for Mild chronic small vessel ischemic change with multiple small remote bilateral cerebellar infarcts.  Discussed with hepatology, and concern that encephalopathy not solely due to hepatic encephalopathy.  Neurology consulted. "MRI with objective findings of cerebellar infarcts likely accounting for physical presentation. " CT abdomen pelvis with contrast completed. "Mild interval increase in size of hypodense hepatic nodule.  The questionable lesion identified on ultrasound not confidently seen on this single phase exam though the nodular contour of the liver appears similar prior CT.  Triple phase liver study can be repeated at no additional charge. "Discussed with hepatology.  MRI abdomen ordered to further evaluate. " No suspicious enhancing hepatic lesion.  The lesion described on prior CT is less conspicuous on MRI without concerning enhancement." Patient with up trending creatinine.  Given IV fluids with improvement.  However, hyponatremia worsening following administration of IV fluids.  IV fluids held with up trend in sodium levels.  Patient with ongoing headache with nausea and vomiting.  Concern for medication overuse headache and " potential opioid seeking behavior.  Discussed with Neurology as previously followed in case as given patient with migraine history.  After discussion with Neurology and hepatology, steroids initiated for management in addition to gentle IV fluids.  Due to worsening abdominal pain, CT abdomen pelvis obtained as ultrasound largely unremarkable apart from ascites findings.  IV Rocephin was also initiated as unable to sample fluid for SBP.  CT abdomen pelvis concerning for fluid retention.  IV Lasix ordered.  However, called to bedside by nursing on 03/06 as patient wanting to leave AMA.  Met patient at bedside and had extensive discussion regarding management plan.  Patient very unhappy regarding overall management of her altered mental status, hyponatremia, abdominal pain.  Patient further became inflammatory and upset when brought up in discussion concern for opioid seeking behavior.  Patient unhappy that abdominal pain will be attempted to be managed with IV Lasix.  Management plan thoroughly and repeatedly discussed with patient with reasoning; patient notes that she would like to leave AMA despite potential risks of foregoing further inpatient management.  She refuses to sign AMA form as concerned about insurance paying issues.  Nursing and  staff notified of conversation and patient decision.  Will still place ambulatory referrals for follow-up and send any new medications to patient pharmacy.  Hope that patient continues to follow-up closely with outpatient providers.    Vitals:    03/06/23 0822 03/06/23 1017 03/06/23 1136 03/06/23 1149   BP: (!) 142/66   129/60   BP Location: Left arm      Patient Position: Lying      Pulse: 70 73  74   Resp: 19 18 14 16   Temp: 98.1 °F (36.7 °C)   98.5 °F (36.9 °C)   TempSrc: Oral      SpO2: 99% 97%  100%   Weight:       Height:

## 2023-03-02 NOTE — ASSESSMENT & PLAN NOTE
Patient has persistent depression which is unknown and is currently controlled. Will Continue anti-depressant medications. We will not consult psychiatry at this time. Patient does not display psychosis at this time. Continue to monitor closely and adjust plan of care as needed. venlafaxine 150 mg daily.

## 2023-03-02 NOTE — PLAN OF CARE
Problem: Physical Therapy  Goal: Physical Therapy Goal  Description: Goals to be met by: 3/23/2023    Patient will increase functional independence with mobility by performin. Supine to sit with Modified [place]  2. Sit to supine with Modified [place]  3. Sit to stand transfer with Stand-by Assistance  4. Bed to chair transfer with Stand-by Assistance using Rolling Walker/LRAD  5. Gait  x 100 feet with Stand-by Assistance using Rolling Walker/LRAD.   6. Ascend/Descend 6 inch curb step with Contact Guard Assistance using Rolling Walker/LRAD.    Outcome: Ongoing, Progressing   Eval completed goals appropriate at this time

## 2023-03-02 NOTE — ASSESSMENT & PLAN NOTE
- Pt with HE upon admission with elevated ammonia levels likely to cirrhosis of liver  - Pt with reports of acute on chronic hx of confusion  - Currently being treated with Lactulose and titrated per hepatology recommendations to 3-4 BM/day  - Last Ammonia level 56 2/28  - Pt encephalopathy likely due to HE as can be found with objective findings of elevated ammonia levels    PLAN  - Agree with hepatology (appreciate recs), continue current titration of Lactulose  -

## 2023-03-02 NOTE — NURSING
0732: PT off unit for CT/MRI via transport and wheelchair. NAD noted. AAO x 4. 1249: PT back on unit and in room 8046. NAD noted. CT and MRI completed.

## 2023-03-02 NOTE — PLAN OF CARE
Problem: Adult Inpatient Plan of Care  Goal: Plan of Care Review  Outcome: Ongoing, Progressing  Goal: Absence of Hospital-Acquired Illness or Injury  Outcome: Ongoing, Progressing  Goal: Optimal Comfort and Wellbeing  Outcome: Ongoing, Progressing  Goal: Readiness for Transition of Care  Outcome: Ongoing, Progressing     Problem: Infection  Goal: Absence of Infection Signs and Symptoms  Outcome: Ongoing, Progressing     Problem: Fall Injury Risk  Goal: Absence of Fall and Fall-Related Injury  Outcome: Ongoing, Progressing     PT alert and oriented x 3. Able to voice all wants and needs. All needs met.  CT of ABD and pelvis completed. MRI of brain completed this shift. She has remained free of falls and injuries. She has required use of IV pain pain medication. No adverse effects noted to meds. Safety eduction and plan of care reviewed with PT and he voiced understanding. Bed is low and locked with call light with in easy reach.  Bed alarm set and audible.

## 2023-03-02 NOTE — ASSESSMENT & PLAN NOTE
Home medication includes Klonopin 0.5 mg b.i.d. p.r.n.  Potentially contributing to encephalopathy, will discuss with patient regarding reduction and eventual cessation

## 2023-03-02 NOTE — NURSING
Plan of care reviewed with pt. PT aaox4. Pt still is expressing some confusion. Ataxia still present. Neurology consulted and signed off on pt. Pt complaining of a horrible headache relieved with oxy and dilaudid. VSS. Pt remained free of falls, trauma, and injury. Will continue to monitor.

## 2023-03-02 NOTE — PLAN OF CARE
Problem: Occupational Therapy  Goal: Occupational Therapy Goal  Description: Goals to be met by: 3/16/2023     Patient will increase functional independence with ADLs by performing:    UE Dressing with Modified [place].  Grooming while standing at sink with Modified [place].  Toileting from toilet with Modified [place] for hygiene and clothing management.   Toilet transfer to toilet with Modified [place].    Outcome: Ongoing, Progressing   Lizette Campos OT  3/2/2023

## 2023-03-02 NOTE — PROGRESS NOTES
"[facility] - Telemetry [facility] Medicine  Progress Note    Patient Name: Elda Hernandez  MRN: [id number]  Patient Class: IP- Inpatient   Admission Date: 2/28/2023  Length of Stay: 2 days  Attending Physician: Lizeth Worley MD  Primary Care Provider: David Lorenzo MD        Subjective:     Principal Problem:Decompensated liver disease        HPI:  Elda Hernandez is a 52-year-old woman a past medical history of primary hypertension, cirrhosis of transplanted liver, Von Gierke disease s/p transplant, and hypothyroidism, who presents to the emergency department with altered mental status. The patient is accompanied by her  who assists in providing the history. Per report, the patient has been confused and repetitive with her speech per her 's report for the past week. The patient follows closely with the Liver Transplant clinic and recently had her ammonia checked given the confusion and it was noted that it was elevated. She was recently started on lactulose by her provider and states that she has been having at least one bowel movement per day with the exception of yesterday when the patient said she had multiple episodes of loose, runny stool. She also notes increasing abdominal distention and generalized abdominal pain. The patient says that the last time she was hospitalized, a paracentesis was attempted but was not successful given there was not a pocket of fluid that was able to be drained. The patient denies any fevers, chills or rigors but states that she often does not become febrile because of her immunosuppression. Additionally, the patient complains of a left-sided neck mass that has been present for "some time." She says that her primary care physician is aware of the mass and that he ordered an ultrasound to further investigate the mass but says that it has not yet been completed. She also complains of a severe headache that is generalized. She denies any chest pain, shortness of " "breath or dyspnea on exertion.    In the emergency department, the patient was noted to be have stable vital signs upon arrival. Her labs were significant for a chronic neutropenia and thrombocytopenia with platelet count of 54. Potassium was noted to be 3.3. Magnesium was 1.5. The patient's liver enzymes elevated with AST 77 ALT 52 and alkaline phosphatase 582. Bilirubin at 2.3. Ammonia at 56. A CT head was ordered which revealed no acute abnormality. The patient was admitted to Hospital Medicine for further management.      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 Asterixis noted on examination. Mildly elevated LFTs. Started on lactulose and rifaximin. Per IR, During ultrasound evaluation, no ascites identified for safe paracentesis.  Liver ultrasound with Doppler without evidence of portal venous thrombosis and without significant ascites but notable for 2.8 cm hypo attenuation.  MRI brain W WO with subtle findings concerning for hepatic encephalopathy though other toxic or metabolic derangement or encephalitis could have similar appearance; no acute process.  Also notable for Mild chronic small vessel ischemic change with multiple small remote bilateral cerebellar infarcts.  Discussed with hepatology, and concern that encephalopathy not solely due to hepatic encephalopathy.  Neurology consulted.  CT abdomen pelvis with contrast completed. "Mild interval increase in size of hypodense hepatic nodule.  The questionable lesion identified on ultrasound not confidently seen on this single phase exam though the nodular contour of the liver appears similar prior CT.  Triple phase liver study can be repeated at no additional charge. "Discussed with hepatology.  MRI abdomen ordered to further " evaluate.      Interval History:   Patient seen and examined at bedside.    No acute events overnight.  She is oriented to person, place, time.  Per  at bedside, not quite at mental status baseline.  Patient has no acute complaints this morning.  Intermittently requiring pain medicine for intermittent headache and abdominal pain.  Patient notes chronic issue with migraines.  Patient updated regarding care plan.      Review of Systems   Constitutional:  Negative for chills, fatigue and fever.   HENT:  Negative for congestion, postnasal drip, rhinorrhea, sneezing and sore throat.    Eyes:  Negative for photophobia and visual disturbance.   Respiratory:  Negative for cough, choking, chest tightness and shortness of breath.    Cardiovascular:  Negative for chest pain, palpitations and leg swelling.   Gastrointestinal:  Positive for abdominal distention and abdominal pain. Negative for constipation, diarrhea, nausea and vomiting.   Endocrine: Negative for polyphagia and polyuria.   Genitourinary:  Negative for decreased urine volume, difficulty urinating, dysuria and urgency.   Musculoskeletal:  Positive for gait problem. Negative for arthralgias and myalgias.   Skin:  Negative for rash and wound.   Neurological:  Positive for dizziness, tremors, light-headedness and headaches. Negative for seizures and syncope.   Psychiatric/Behavioral:  Negative for agitation and decreased concentration.    Objective:     Vital Signs (Most Recent):  Temp: 98.5 °F (36.9 °C) (03/02/23 0745)  Pulse: 67 (03/02/23 0745)  Resp: 18 (03/02/23 0745)  BP: (!) 102/55 (03/02/23 0745)  SpO2: 100 % (03/02/23 0745)   Vital Signs (24h Range):  Temp:  [97.9 °F (36.6 °C)-99.2 °F (37.3 °C)] 98.5 °F (36.9 °C)  Pulse:  [62-80] 67  Resp:  [16-18] 18  SpO2:  [96 %-100 %] 100 %  BP: (102-125)/(51-61) 102/55     Weight: 51.7 kg (114 lb)  Body mass index is 23.03 kg/m².  No intake or output data in the 24 hours ending 03/02/23 0851   Physical  Exam  Vitals and nursing note reviewed.   Constitutional:       General: She is not in acute distress.     Appearance: She is ill-appearing (chronically). She is not toxic-appearing or diaphoretic.   HENT:      Head: Normocephalic and atraumatic.      Right Ear: External ear normal.      Left Ear: External ear normal.      Mouth/Throat:      Mouth: Mucous membranes are moist.   Eyes:      General: No scleral icterus.        Right eye: No discharge.         Left eye: No discharge.   Cardiovascular:      Rate and Rhythm: Normal rate and regular rhythm.      Pulses: Normal pulses.      Heart sounds: Normal heart sounds. No murmur heard.    No friction rub. No gallop.   Pulmonary:      Effort: Pulmonary effort is normal. No respiratory distress.      Breath sounds: No stridor. No wheezing, rhonchi or rales.   Chest:      Chest wall: No tenderness.   Abdominal:      General: Abdomen is flat. Bowel sounds are normal. There is no distension.      Palpations: There is no mass.      Tenderness: There is no abdominal tenderness. There is no guarding or rebound.      Hernia: No hernia is present.   Musculoskeletal:      Cervical back: Normal range of motion and neck supple.      Right lower leg: No edema.      Left lower leg: No edema.   Skin:     General: Skin is warm and dry.   Neurological:      Mental Status: She is alert and oriented to person, place, and time.      Sensory: No sensory deficit.      Motor: No weakness.      Comments: + mild asterixis, oriented but pressured speech   Psychiatric:         Behavior: Behavior normal.       Significant Labs: All pertinent labs within the past 24 hours have been reviewed.    Recent Results (from the past 24 hour(s))   Comprehensive Metabolic Panel (CMP)    Collection Time: 03/02/23  5:11 AM   Result Value Ref Range    Sodium 133 (L) 136 - 145 mmol/L    Potassium 3.9 3.5 - 5.1 mmol/L    Chloride 104 95 - 110 mmol/L    CO2 21 (L) 23 - 29 mmol/L    Glucose 89 70 - 110 mg/dL    BUN  22 (H) 6 - 20 mg/dL    Creatinine 1.2 0.5 - 1.4 mg/dL    Calcium 8.3 (L) 8.7 - 10.5 mg/dL    Total Protein 5.3 (L) 6.0 - 8.4 g/dL    Albumin 2.3 (L) 3.5 - 5.2 g/dL    Total Bilirubin 1.4 (H) 0.1 - 1.0 mg/dL    Alkaline Phosphatase 451 (H) 55 - 135 U/L    AST 56 (H) 10 - 40 U/L    ALT 34 10 - 44 U/L    Anion Gap 8 8 - 16 mmol/L    eGFR 53.8 (A) >60 mL/min/1.73 m^2   Magnesium    Collection Time: 03/02/23  5:11 AM   Result Value Ref Range    Magnesium 1.8 1.6 - 2.6 mg/dL   CBC Without Differential    Collection Time: 03/02/23  5:11 AM   Result Value Ref Range    WBC 3.19 (L) 3.90 - 12.70 K/uL    RBC 3.21 (L) 4.00 - 5.40 M/uL    Hemoglobin 9.8 (L) 12.0 - 16.0 g/dL    Hematocrit 29.7 (L) 37.0 - 48.5 %    MCV 93 82 - 98 fL    MCH 30.5 27.0 - 31.0 pg    MCHC 33.0 32.0 - 36.0 g/dL    RDW 15.1 (H) 11.5 - 14.5 %    Platelets 66 (L) 150 - 450 K/uL    MPV 12.1 9.2 - 12.9 fL   Tacrolimus level    Collection Time: 03/02/23  5:11 AM   Result Value Ref Range    Tacrolimus Lvl 5.9 5.0 - 15.0 ng/mL   Protime-INR    Collection Time: 03/02/23  5:11 AM   Result Value Ref Range    Prothrombin Time 11.8 9.0 - 12.5 sec    INR 1.1 0.8 - 1.2   Vitamin B12    Collection Time: 03/02/23  5:11 AM   Result Value Ref Range    Vitamin B-12 >2000 (H) 210 - 950 pg/mL         Significant Imaging: I have reviewed all pertinent imaging results/findings within the past 24 hours.    Imaging Results              US Doppler Liver Transplant Post (xpd) (Final result)  Result time 03/01/23 02:08:28   Procedure changed from US Liver with Doppler (xpd)     Final result by Clark Morales MD (03/01/23 02:08:28)                   Impression:      No evidence of portal venous thrombosis as questioned.  Satisfactory Doppler evaluation of the liver transplant.    Cirrhotic morphology of the hepatic allograft.  Questionable 2.8 cm hypoechoic area at the periphery of the left hepatic lobe which may reflect changes related to nodular contour versus possible lesion.   Consider further evaluation with multiphase CT or MRI if clinically warranted.    Electronically signed by resident: Lazaro Pacheco  Date:    03/01/2023  Time:    01:36    Electronically signed by: Clark Morales MD  Date:    03/01/2023  Time:    02:08               Narrative:    EXAMINATION:  US DOPPLER LIVER TRANSPLANT POST (XPD)    CLINICAL HISTORY:  R/O portal vein thrombosis; R/O Ascites;    TECHNIQUE:  Limited abdominal ultrasound of the transplant liver with Doppler evaluation.  Color and spectral Doppler were performed.    COMPARISON:  Liver transplant Doppler 01/03/2023, 03/25/2022, CT abdomen pelvis 03/24/2022    FINDINGS:  Patient is status post orthotopic liver transplant in 2002.  Liver allograft is demonstrates a nodular contour with heterogeneous echotexture suggestive of cirrhosis.  2.3 x 2.5 x 2.8 cm questionable slightly hypoattenuating area in the left hepatic lobe demonstrating vascularity, which may reflect nodular changes related to cirrhosis over a left hepatic lobe lesion.  No fluid collections or significant upper abdominal ascites.    The common duct is not significantly distended for post cholecystectomy status, maximally measuring 8 mm and similar to prior noting it measured up to 7 mm on 03/25/2022.  No dilated intrahepatic radicles are seen.    Color flow and spectral waveform analysis was performed.  The main portal vein, right portal vein, left portal vein, middle hepatic vein, right hepatic vein, left hepatic vein, and IVC are patent with proper directional flow.  Peak velocity of the main portal vein is 30 cm/sec.    Anastomosis site of the main hepatic artery demonstrates a peak systolic velocity 131 cm/sec.  (Previously 137)    Main hepatic artery demonstrates resistive index 0.77 with normal waveform.  (Previously 0.79)    Left hepatic artery shows resistive index 0.75 with normal waveform.  (Previously 0.81)    Anterior branch of the right hepatic artery demonstrates  resistive index 0.75 with normal waveform.  (Previously 0.81)    Posterior branch of the right hepatic artery demonstrates resistive index 0.72 with normal waveform.  (Previously 0.79)                                       CT Soft Tissue Neck With Contrast (Final result)  Result time 02/28/23 23:47:24      Final result by Clark Morales MD (02/28/23 23:47:24)                   Impression:      No drainable abscess identified in the neck soft tissues.  Further evaluation/follow-up including direct visualization as clinically warranted.    Patulous appearance of the esophagus with fluid and air in the esophageal lumen.    Additional findings discussed in the body of the report.      Electronically signed by: Clark Morales MD  Date:    02/28/2023  Time:    23:47               Narrative:    EXAMINATION:  CT SOFT TISSUE NECK WITH CONTRAST    CLINICAL HISTORY:  Neck mass, nonpulsatile;Neck abscess, deep tissue; No additional clinical history or physical exam provided in the chart at the time of dictation.    TECHNIQUE:  CT images obtained throughout the region of the neck after the administration of 75 mL Omnipaque 350 intravenous contrast. Axial, sagittal and coronal reconstructions were performed.    COMPARISON:  CT chest, 09/04/2020.    FINDINGS:  Exam quality is limited by suboptimal positioning and motion artifact.    No abnormal fluid collection identified in the neck soft tissues.  No convincing enhancing mass identified in the neck soft tissues.  Recommend correlation with physical exam and/or direct visualization as clinically warranted.    Epiglottis is unremarkable.  The parotid and submandibular glands are symmetric without significant adjacent fat stranding.  Subcentimeter hypodensity in the left lobe of the thyroid gland, too small for dedicated follow-up.    Major vessels of the neck appear patent.  Atherosclerosis and narrowing of the bilateral carotid arteries.  Limited intracranial evaluation  appears negative for acute finding.  The visualized paranasal sinuses are essentially clear.  Postoperative changes in the right mastoid air cells.  Left mastoid air cells are essentially clear.  Postoperative changes of suboccipital craniectomy.    There is a questionable partially visualized nodule versus atelectasis or scarring in the right upper lobe (axial series 3, image 394).  This finding is likely similar when compared with prior CT dated 09/04/2020.  Lung apices are otherwise unremarkable.    Patulous appearance of the visualized esophagus with air and fluid in the mid esophageal lumen.    No aggressive osseous lesion identified.  Minimal vertebral body height loss at T3, likely chronic.  Partially visualized postoperative changes of prior median sternotomy.                                       X-Ray Chest AP Portable (Final result)  Result time 02/28/23 19:36:17      Final result by Samuel Grant MD (02/28/23 19:36:17)                   Impression:      1. No acute cardiopulmonary process.      Electronically signed by: Samuel Grant MD  Date:    02/28/2023  Time:    19:36               Narrative:    EXAMINATION:  XR CHEST AP PORTABLE    CLINICAL HISTORY:  ams;    TECHNIQUE:  Single frontal view of the chest was performed.    COMPARISON:  03/24/2022    FINDINGS:  The cardiomediastinal silhouette is not enlarged, magnified by technique.  There is no pleural effusion.  The trachea is midline.  The lungs are symmetrically expanded bilaterally without evidence of acute parenchymal process. No large focal consolidation seen.  There is no pneumothorax.  The osseous structures are remarkable for degenerative changes.  Patient is rotated..                                       CT Head Without Contrast (Final result)  Result time 02/28/23 18:54:58      Final result by González Kwok MD (02/28/23 18:54:58)                   Impression:      No evidence of acute intracranial pathology.  Additional evaluation,  as clinically warranted.    Stable multifocal parenchymal calcifications, nonspecific and potentially related to remote infectious or metabolic etiology.    Electronically signed by resident: Jakob Monteiro  Date:    02/28/2023  Time:    18:36    Electronically signed by: González Kwok MD  Date:    02/28/2023  Time:    18:54               Narrative:    EXAMINATION:  CT HEAD WITHOUT CONTRAST    CLINICAL HISTORY:  Headache, chronic, new features or increased frequency;    TECHNIQUE:  Low dose axial CT images obtained throughout the head without the use of intravenous contrast.  Axial, sagittal and coronal reconstructions were performed.    COMPARISON:  CT 09/25/2020, MRI 04/24/2015    FINDINGS:  Intracranial compartment:    The ventricular configuration appears stable from prior exam without evidence of hydrocephalus.    Brain parenchyma appears unchanged with multifocal parenchymal calcifications stable in distribution compared to the prior exam.  No parenchymal mass, hemorrhage, edema or major vascular distribution infarct. No extra-axial blood or fluid collections.    Skull/extracranial contents (limited evaluation):    Remote postoperative changes of suboccipital craniectomy.  Prior right mastoidectomy.  Left mastoid air cells are clear.  Visualized paranasal sinuses are clear.                                          Assessment/Plan:      * Decompensated liver disease  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  Encephalopathy    Patient presenting with confusion and disorientation with notable flapping tremor concerning for hepatic encephalopathy. 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 Patient also with abdominal distention and generalized abdominal pain concerning for underlying ascites. She denies any recent GI bleeding. Follows with Liver Transplant clinic as an outpatient.    - Hepatology consulted, appreciate recommendations  -During IR  ultrasound evaluation, no ascites identified for safe paracentesis  -U/S liver w/ Questionable 2.8 cm hypoechoic area at the periphery of the left hepatic lobe; CT triple phase to further evaluate inconclusive; discussed with hepatology, order MRI abdomen  - lactulose 20 g TID  - rifaximin 500 mg BID    MELD-Na score: 11 at 3/2/2023  5:11 AM  MELD score: 11 at 3/2/2023  5:11 AM  Calculated from:  Serum Creatinine: 1.2 mg/dL at 3/2/2023  5:11 AM  Serum Sodium: 133 mmol/L at 3/2/2023  5:11 AM  Total Bilirubin: 1.4 mg/dL at 3/2/2023  5:11 AM  INR(ratio): 1.1 at 3/2/2023  5:11 AM  Age: 52 years      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  Tacrolimus 0.5 mg BID. Trend tacrolimus levels daily.  Appreciate hepatology input.      Depression  Patient has persistent depression which is unknown and is currently controlled. Will Continue anti-depressant medications. We will not consult psychiatry at this time. Patient does not display psychosis at this time. Continue to monitor closely and adjust plan of care as needed. venlafaxine 150 mg daily.        Hypothyroidism  Resume home levothyroxine.      Anxiety  Home medication includes Klonopin 0.5 mg b.i.d. p.r.n.  Potentially contributing to encephalopathy, will discuss with patient regarding reduction and eventual cessation      Depression, recurrent  Resume home           SIADH (syndrome of inappropriate ADH production)  Patient used to take demeclocycline at home. Patient's sodium level within normal limits upon admission. Will continue to trend.  Na 133 today        VTE Risk Mitigation (From admission, onward)         Ordered     Place sequential compression device  Until discontinued         03/01/23 0151     IP VTE LOW RISK PATIENT  Once         02/28/23 1958                Discharge Planning   MARILEE: 3/3/2023     Code Status: Full Code   Is the patient medically ready for discharge?: No    Reason for patient still in hospital (select all that apply): Patient  trending condition, Treatment, Consult recommendations and Pending disposition  Discharge Plan A: Home with family                  Lizeth Worley MD  Department of Hospital Medicine   [facility] - Telemetry Stepdown

## 2023-03-02 NOTE — ASSESSMENT & PLAN NOTE
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  Encephalopathy    Patient presenting with confusion and disorientation with notable flapping tremor concerning for hepatic encephalopathy. 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 Patient also with abdominal distention and generalized abdominal pain concerning for underlying ascites. She denies any recent GI bleeding. Follows with Liver Transplant clinic as an outpatient.    - Hepatology consulted, appreciate recommendations  -During IR ultrasound evaluation, no ascites identified for safe paracentesis  -U/S liver w/ Questionable 2.8 cm hypoechoic area at the periphery of the left hepatic lobe; CT triple phase to further evaluate inconclusive; discussed with hepatology, order MRI abdomen  - lactulose 20 g TID  - rifaximin 500 mg BID    MELD-Na score: 11 at 3/2/2023  5:11 AM  MELD score: 11 at 3/2/2023  5:11 AM  Calculated from:  Serum Creatinine: 1.2 mg/dL at 3/2/2023  5:11 AM  Serum Sodium: 133 mmol/L at 3/2/2023  5:11 AM  Total Bilirubin: 1.4 mg/dL at 3/2/2023  5:11 AM  INR(ratio): 1.1 at 3/2/2023  5:11 AM  Age: 52 years

## 2023-03-02 NOTE — SUBJECTIVE & OBJECTIVE
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    Arnold-Chiari malformation     Depression     Esophageal stricture     Essential tremor     Hypertension     Left bundle branch block     Liver fibrosis, transplanted liver 10/2/2018    Suggested on fibroscan 10/2/18    Migraine without aura     MVP (mitral valve prolapse)     Non-rheumatic mitral regurgitation 10/1/2018    Non-rheumatic tricuspid valve insufficiency 10/1/2018    Osteoporosis     Recurrent urinary tract infection     Seizures     Shingles 2007    SIADH (syndrome of inappropriate ADH production)     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    Tricuspid valve prolapse     Urolithiasis     Von Gierke disease     s/p liver transplant       Past Surgical History:   Procedure Laterality Date    APPENDECTOMY  6/22/2007    APPLICATION OF WOUND VACUUM-ASSISTED CLOSURE DEVICE N/A 9/18/2020    Procedure: APPLICATION, WOUND VAC;  Surgeon: Zain Decker MD;  Location: [address];  Service: General;  Laterality: N/A;    COLONOSCOPY  5/13/2008    internal hemorrhoids    CRANIOTOMY      ESOPHAGOGASTRODUODENOSCOPY N/A 9/3/2020    Procedure: EGD (ESOPHAGOGASTRODUODENOSCOPY);  Surgeon: Tyrel Vergara MD;  Location: [facility];  Service: Endoscopy;  Laterality: N/A;    EXPLORATORY LAPAROTOMY  2020    due to perforated stomach    LAMINECTOMY  3/2001    LIVER TRANSPLANT  9/23/2002    OSSICULAR RECONSTRUCTION  10/4/1995    RIGHT REPLACEMENT PROSTHESIS for cholesteatoma    THYMECTOMY  5/2/2007    TONSILLECTOMY, ADENOIDECTOMY  1/21/2004    TOTAL ABDOMINAL HYSTERECTOMY  3/31/1994       Review of patient's allergies indicates:   Allergen Reactions    Codeine Itching     Other reaction(s): Itching    Lipitor [atorvastatin] Other (See Comments)     Other reaction(s): Muscle pain  Muscle cranmps    Morphine Itching     Other reaction(s): nausea and vomiting     Zoloft [sertraline] Other (See Comments)     Tremors/muscle spasms        Current Neurological Medications: NA    No current facility-administered medications on file prior to encounter.     Current Outpatient Medications on File Prior to Encounter   Medication Sig    acetaminophen (TYLENOL) 325 MG tablet Take 2 tablets (650 mg total) by mouth every 6 (six) hours as needed. (Patient taking differently: Take 650 mg by mouth 2 (two) times daily as needed (headache).)    butalbitaL-acetaminophen  mg Tab Take 1 tablet by mouth 2 (two) times daily as needed (headaches).    calcium carbonate (TUMS) 200 mg calcium (500 mg) chewable tablet Take 1 tablet (500 mg total) by mouth 2 (two) times daily as needed. (Patient taking differently: Take 1,500 mg by mouth daily as needed for Heartburn.)    calcium phosphate trib/vit D3 (CALTRATE GUMMY BITES ORAL) Take by mouth.    clonazePAM (KLONOPIN) 0.5 MG tablet Take 1 tablet (0.5 mg total) by mouth 2 (two) times daily as needed for Anxiety.    fluorometholone 0.1% (FML) 0.1 % DrpS Place 2 drops into the left eye 2 (two) times daily.    furosemide (LASIX) 20 MG tablet TAKE 1 TABLET(20 MG) BY MOUTH EVERY DAY    hydrocortisone 2.5 % cream Apply topically to affected area twice daily as needed    lactulose (CHRONULAC) 10 gram/15 mL solution Take 30 mLs (20 g total) by mouth 2 (two) times daily.    levothyroxine (SYNTHROID) 75 MCG tablet TAKE 1 TABLET(75 MCG) BY MOUTH EVERY DAY    magnesium oxide (MAG-OX) 400 mg (241.3 mg magnesium) tablet TAKE 1 TABLET(400 MG) BY MOUTH TWICE DAILY    multivitamin capsule Take 1 capsule by mouth once daily.    ondansetron (ZOFRAN-ODT) 4 MG TbDL DISSOLVE 1 TABLET(4 MG) ON THE TONGUE EVERY 6 HOURS AS NEEDED    pantoprazole (PROTONIX) 40 MG tablet Take 1 tablet (40 mg total) by mouth once daily.    polyethylene glycol (GLYCOLAX) 17 gram/dose powder MIX 17 GRAMS (1 capful) IN LIQUID AND DRINK BY MOUTH TWICE DAILY    pravastatin (PRAVACHOL) 10 MG tablet Take 1 tablet (10 mg total) by mouth once daily.    promethazine  (PHENERGAN) 25 MG tablet TAKE 1 TABLET BY MOUTH EVERY 4 HOURS AS NEEDED IF UNRELIEVED BY ZOFRAN    psyllium husk, aspartame, (METAMUCIL) 3.4 gram PwPk packet Take 1 packet by mouth once daily.    ramipriL (ALTACE) 5 MG capsule TAKE 1 CAPSULE(5 MG) BY MOUTH EVERY EVENING    SHINGRIX, PF, 50 mcg/0.5 mL injection     tacrolimus (PROGRAF) 0.5 MG Cap Take 1 capsule (0.5 mg total) by mouth every 12 (twelve) hours.    valACYclovir (VALTREX) 1000 MG tablet TAKE 2 TABLETS(2000 MG) BY MOUTH TWICE DAILY FOR 2 DOSES    venlafaxine (EFFEXOR-XR) 150 MG Cp24 TAKE 1 CAPSULE(150 MG) BY MOUTH EVERY DAY    [DISCONTINUED] flu vacc [id number] 6mos up,PF, (FLUARIX QUAD 9971-0304, PF,) 60 mcg (15 mcg x 4)/0.5 mL Syrg Inject 0.5 mLs into the muscle.    [DISCONTINUED] FLUCELVAX QUAD 2528-8710, PF, 60 mcg (15 mcg x 4)/0.5 mL Syrg      Family History       Problem Relation (Age of Onset)    Heart disease Mother    No Known Problems Father    Stroke Mother          Tobacco Use    Smoking status: Never    Smokeless tobacco: Never   Substance and Sexual Activity    Alcohol use: No    Drug use: No    Sexual activity: Not on file     Review of Systems   Constitutional:  Negative for chills, fever and unexpected weight change.   HENT:  Negative for tinnitus.    Eyes:  Negative for photophobia and visual disturbance.   Respiratory:  Negative for shortness of breath.    Cardiovascular:  Negative for chest pain.   Gastrointestinal:  Negative for abdominal distention and abdominal pain.   Genitourinary:  Negative for dysuria.   Musculoskeletal: Negative.    Neurological:  Positive for headaches (chronic). Negative for dizziness, syncope and weakness.   Psychiatric/Behavioral:  Negative for confusion. The patient is nervous/anxious.    Objective:     Vital Signs (Most Recent):  Temp: 97.2 °F (36.2 °C) (03/02/23 1057)  Pulse: 62 (03/02/23 1057)  Resp: 18 (03/02/23 1057)  BP: (!) 113/53 (03/02/23 1057)  SpO2: 95 % (03/02/23 1057)   Vital Signs (24h  Range):  Temp:  [97.2 °F (36.2 °C)-98.5 °F (36.9 °C)] 97.2 °F (36.2 °C)  Pulse:  [62-80] 62  Resp:  [16-19] 18  SpO2:  [95 %-100 %] 95 %  BP: (102-123)/(51-61) 113/53     Weight: 51.7 kg (114 lb)  Body mass index is 23.03 kg/m².    Physical Exam  Constitutional:       Appearance: Normal appearance.   HENT:      Head: Normocephalic.   Eyes:      Pupils: Pupils are equal, round, and reactive to light.   Cardiovascular:      Rate and Rhythm: Normal rate.   Pulmonary:      Effort: Pulmonary effort is normal.   Abdominal:      General: Abdomen is flat.   Neurological:      Mental Status: She is alert and oriented to person, place, and time.   Psychiatric:         Mood and Affect: Mood normal.       NEUROLOGICAL EXAMINATION:     MENTAL STATUS   Oriented to person, place, and time.   Attention: normal. Concentration: normal.     CRANIAL NERVES     CN III, IV, VI   Pupils are equal, round, and reactive to light.    MOTOR EXAM   Right arm tone: normal  Left arm tone: normal       Failed finger to nose test R>L  Dysdiadochokinesia  Failed shin to heel R>L  Nystagmus present b/l      Significant Labs: CBC:   Recent Labs   Lab 02/28/23 1759 02/28/23  1830 03/01/23 0257 03/02/23  0511   WBC 3.20*  --  3.12* 3.19*   HGB 12.3  --  10.8* 9.8*   HCT 36.6* 32* 32.9* 29.7*   PLT 54*  --  58* 66*     CMP:   Recent Labs   Lab 02/28/23 1759 03/01/23  0257 03/02/23  0511   GLU 73 68* 89    139 133*   K 3.3* 3.9 3.9    106 104   CO2 25 25 21*   BUN 14 15 22*   CREATININE 0.9 0.9 1.2   CALCIUM 8.9 8.2* 8.3*   MG 1.5* 1.4* 1.8   PROT 7.1 5.7* 5.3*   ALBUMIN 3.1* 2.5* 2.3*   BILITOT 2.3* 2.2* 1.4*   ALKPHOS 582* 469* 451*   AST 77* 61* 56*   ALT 52* 41 34   ANIONGAP 11 8 8       Significant Imaging: CT: I have reviewed all pertinent results/findings within the past 24 hours and my personal findings are:

## 2023-03-02 NOTE — CONSULTS
[facility] - Telemetry Stepdown  Neurology  Consult Note    Patient Name: Elda Hernandez  MRN: [id number]  Admission Date: 2/28/2023  Hospital Length of Stay: 2 days  Code Status: Full Code   Attending Provider: Lizeth Worley MD   Consulting Provider: Sammi Lamas MD  Primary Care Physician: David Lorenzo MD  Principal Problem:Decompensated liver disease    Consults   Subjective:     Chief Complaint:  Ataxia     HPI:   Pt is a 53 yo female w/pmhx of cirrhosis of transplanted liver, Von Gierke disease s/p transplant, hypothyroidism, and hypertension presented to ED on 2/28 with complaints of AMS. Presented with  for worsening confusion since 2/14. At that time patient worked up by PCP for hepatic encephalopathy noted to have elevated ammonia levels (138) and placed on lactulose. On ER evaluation pt still with worsening elevated ammonia levels (156) and laboratory findings consistent with cirrhosis. Pt admitted and lactulose titrated appropriately,Rifaximin, and currently on immunosuppressive Tacrolimus for liver transplant (tac levels appropriate). Pt with extensive medical history including endoscopic complication with gastric perforation 9/2020 which subsequently led to extensive ICU course followed by months of physical rehabilitation. Pt reports since that time she has been ambulatory, but with ataxia. Pt no longer drives due to these symptoms of ataxia and balance issues. MRI from 3/1/2023 shows bilateral cerebellar infarcts. Unknown when this cerebellar stroke occurred as only prior CTH is from 9/2020 (during ICU course) which was negative for infarcts at that time. Today, patient endorses these symptoms have been present for years, but was concerned for neurological etiology as she had new-onset confusion over the last two weeks. This likely is explained by HE for which she is being treated at this time. Denies additional neurological complaints or deficits at this time.        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    Arnold-Chiari malformation     Depression     Esophageal stricture     Essential tremor     Hypertension     Left bundle branch block     Liver fibrosis, transplanted liver 10/2/2018    Suggested on fibroscan 10/2/18    Migraine without aura     MVP (mitral valve prolapse)     Non-rheumatic mitral regurgitation 10/1/2018    Non-rheumatic tricuspid valve insufficiency 10/1/2018    Osteoporosis     Recurrent urinary tract infection     Seizures     Shingles 2007    SIADH (syndrome of inappropriate ADH production)     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    Tricuspid valve prolapse     Urolithiasis     Von Gierke disease     s/p liver transplant       Past Surgical History:   Procedure Laterality Date    APPENDECTOMY  6/22/2007    APPLICATION OF WOUND VACUUM-ASSISTED CLOSURE DEVICE N/A 9/18/2020    Procedure: APPLICATION, WOUND VAC;  Surgeon: Zain Decker MD;  Location: [address];  Service: General;  Laterality: N/A;    COLONOSCOPY  5/13/2008    internal hemorrhoids    CRANIOTOMY      ESOPHAGOGASTRODUODENOSCOPY N/A 9/3/2020    Procedure: EGD (ESOPHAGOGASTRODUODENOSCOPY);  Surgeon: Tyrel Vergara MD;  Location: [facility];  Service: Endoscopy;  Laterality: N/A;    EXPLORATORY LAPAROTOMY  2020    due to perforated stomach    LAMINECTOMY  3/2001    LIVER TRANSPLANT  9/23/2002    OSSICULAR RECONSTRUCTION  10/4/1995    RIGHT REPLACEMENT PROSTHESIS for cholesteatoma    THYMECTOMY  5/2/2007    TONSILLECTOMY, ADENOIDECTOMY  1/21/2004    TOTAL ABDOMINAL HYSTERECTOMY  3/31/1994       Review of patient's allergies indicates:   Allergen Reactions    Codeine Itching     Other reaction(s): Itching    Lipitor [atorvastatin] Other (See Comments)     Other reaction(s): Muscle pain  Muscle cranmps    Morphine Itching     Other reaction(s): nausea and vomiting     Zoloft [sertraline] Other (See Comments)     Tremors/muscle spasms       Current Neurological  Medications: NA    No current facility-administered medications on file prior to encounter.     Current Outpatient Medications on File Prior to Encounter   Medication Sig    acetaminophen (TYLENOL) 325 MG tablet Take 2 tablets (650 mg total) by mouth every 6 (six) hours as needed. (Patient taking differently: Take 650 mg by mouth 2 (two) times daily as needed (headache).)    butalbitaL-acetaminophen  mg Tab Take 1 tablet by mouth 2 (two) times daily as needed (headaches).    calcium carbonate (TUMS) 200 mg calcium (500 mg) chewable tablet Take 1 tablet (500 mg total) by mouth 2 (two) times daily as needed. (Patient taking differently: Take 1,500 mg by mouth daily as needed for Heartburn.)    calcium phosphate trib/vit D3 (CALTRATE GUMMY BITES ORAL) Take by mouth.    clonazePAM (KLONOPIN) 0.5 MG tablet Take 1 tablet (0.5 mg total) by mouth 2 (two) times daily as needed for Anxiety.    fluorometholone 0.1% (FML) 0.1 % DrpS Place 2 drops into the left eye 2 (two) times daily.    furosemide (LASIX) 20 MG tablet TAKE 1 TABLET(20 MG) BY MOUTH EVERY DAY    hydrocortisone 2.5 % cream Apply topically to affected area twice daily as needed    lactulose (CHRONULAC) 10 gram/15 mL solution Take 30 mLs (20 g total) by mouth 2 (two) times daily.    levothyroxine (SYNTHROID) 75 MCG tablet TAKE 1 TABLET(75 MCG) BY MOUTH EVERY DAY    magnesium oxide (MAG-OX) 400 mg (241.3 mg magnesium) tablet TAKE 1 TABLET(400 MG) BY MOUTH TWICE DAILY    multivitamin capsule Take 1 capsule by mouth once daily.    ondansetron (ZOFRAN-ODT) 4 MG TbDL DISSOLVE 1 TABLET(4 MG) ON THE TONGUE EVERY 6 HOURS AS NEEDED    pantoprazole (PROTONIX) 40 MG tablet Take 1 tablet (40 mg total) by mouth once daily.    polyethylene glycol (GLYCOLAX) 17 gram/dose powder MIX 17 GRAMS (1 capful) IN LIQUID AND DRINK BY MOUTH TWICE DAILY    pravastatin (PRAVACHOL) 10 MG tablet Take 1 tablet (10 mg total) by mouth once daily.    promethazine (PHENERGAN) 25 MG tablet TAKE  1 TABLET BY MOUTH EVERY 4 HOURS AS NEEDED IF UNRELIEVED BY ZOFRAN    psyllium husk, aspartame, (METAMUCIL) 3.4 gram PwPk packet Take 1 packet by mouth once daily.    ramipriL (ALTACE) 5 MG capsule TAKE 1 CAPSULE(5 MG) BY MOUTH EVERY EVENING    SHINGRIX, PF, 50 mcg/0.5 mL injection     tacrolimus (PROGRAF) 0.5 MG Cap Take 1 capsule (0.5 mg total) by mouth every 12 (twelve) hours.    valACYclovir (VALTREX) 1000 MG tablet TAKE 2 TABLETS(2000 MG) BY MOUTH TWICE DAILY FOR 2 DOSES    venlafaxine (EFFEXOR-XR) 150 MG Cp24 TAKE 1 CAPSULE(150 MG) BY MOUTH EVERY DAY    [DISCONTINUED] flu vacc [id number] 6mos up,PF, (FLUARIX QUAD 0164-4187, PF,) 60 mcg (15 mcg x 4)/0.5 mL Syrg Inject 0.5 mLs into the muscle.    [DISCONTINUED] FLUCELVAX QUAD 0660-8990, PF, 60 mcg (15 mcg x 4)/0.5 mL Syrg      Family History       Problem Relation (Age of Onset)    Heart disease Mother    No Known Problems Father    Stroke Mother          Tobacco Use    Smoking status: Never    Smokeless tobacco: Never   Substance and Sexual Activity    Alcohol use: No    Drug use: No    Sexual activity: Not on file     Review of Systems   Constitutional:  Negative for chills, fever and unexpected weight change.   HENT:  Negative for tinnitus.    Eyes:  Negative for photophobia and visual disturbance.   Respiratory:  Negative for shortness of breath.    Cardiovascular:  Negative for chest pain.   Gastrointestinal:  Negative for abdominal distention and abdominal pain.   Genitourinary:  Negative for dysuria.   Musculoskeletal: Negative.    Neurological:  Positive for headaches (chronic). Negative for dizziness, syncope and weakness.   Psychiatric/Behavioral:  Negative for confusion. The patient is nervous/anxious.    Objective:     Vital Signs (Most Recent):  Temp: 97.2 °F (36.2 °C) (03/02/23 1057)  Pulse: 62 (03/02/23 1057)  Resp: 18 (03/02/23 1057)  BP: (!) 113/53 (03/02/23 1057)  SpO2: 95 % (03/02/23 1057)   Vital Signs (24h Range):  Temp:  [97.2 °F (36.2  °C)-98.5 °F (36.9 °C)] 97.2 °F (36.2 °C)  Pulse:  [62-80] 62  Resp:  [16-19] 18  SpO2:  [95 %-100 %] 95 %  BP: (102-123)/(51-61) 113/53     Weight: 51.7 kg (114 lb)  Body mass index is 23.03 kg/m².    Physical Exam  Constitutional:       Appearance: Normal appearance.   HENT:      Head: Normocephalic.   Eyes:      Pupils: Pupils are equal, round, and reactive to light.   Cardiovascular:      Rate and Rhythm: Normal rate.   Pulmonary:      Effort: Pulmonary effort is normal.   Abdominal:      General: Abdomen is flat.   Neurological:      Mental Status: She is alert and oriented to person, place, and time.   Psychiatric:         Mood and Affect: Mood normal.       NEUROLOGICAL EXAMINATION:     MENTAL STATUS   Oriented to person, place, and time.   Attention: normal. Concentration: normal.     CRANIAL NERVES     CN III, IV, VI   Pupils are equal, round, and reactive to light.    MOTOR EXAM   Right arm tone: normal  Left arm tone: normal       Failed finger to nose test R>L  Dysdiadochokinesia  Failed shin to heel R>L  Nystagmus present b/l      Significant Labs: CBC:   Recent Labs   Lab 02/28/23  1759 02/28/23  1830 03/01/23  0257 03/02/23  0511   WBC 3.20*  --  3.12* 3.19*   HGB 12.3  --  10.8* 9.8*   HCT 36.6* 32* 32.9* 29.7*   PLT 54*  --  58* 66*     CMP:   Recent Labs   Lab 02/28/23  1759 03/01/23  0257 03/02/23  0511   GLU 73 68* 89    139 133*   K 3.3* 3.9 3.9    106 104   CO2 25 25 21*   BUN 14 15 22*   CREATININE 0.9 0.9 1.2   CALCIUM 8.9 8.2* 8.3*   MG 1.5* 1.4* 1.8   PROT 7.1 5.7* 5.3*   ALBUMIN 3.1* 2.5* 2.3*   BILITOT 2.3* 2.2* 1.4*   ALKPHOS 582* 469* 451*   AST 77* 61* 56*   ALT 52* 41 34   ANIONGAP 11 8 8       Significant Imaging: CT: I have reviewed all pertinent results/findings within the past 24 hours and my personal findings are:       Assessment and Plan:     Ataxia   - Neurological consult for ataxia, confusion       Pt with complaints of chronic balance issues (years) leading to  patient with decreased inability of ADL's such as driving. Labs reviewed, no obvious metabolic derangements to account for ataxia including normal B12 levels. MRI with objective findings of cerebellar infarcts likely accounting for physical presentation.   Physical exam findings consistent with and correlates to old cerebellar infarct, findings including Pt with dysdiadochokinesia, dysmetria, and lateral nystagmus present, failed heel/shin, finger/nose exam. Again, MRI head 2/28 with chronic bilateral cerebellar infarct, physical findings likely accounting for neurological deficits including ataxia.     PLAN  - Pt would benefit from outpatient physical/vestibular therapy      Encephalopathy    Pt with HE upon admission with elevated ammonia levels likely to cirrhosis of liver. Pt with reports of acute on chronic hx of confusion. Currently being treated with Lactulose and titrated per hepatology recommendations to 3-4 BM/day, Last Ammonia level 56 2/28. Pt encephalopathy likely due to HE as can be found with objective findings of elevated ammonia levels, but has improved with treatment    PLAN  - Agree with hepatology, continue current titration of Lactulose        Thank you for your consult. I will sign off. Please contact us if you have any additional questions.    Sammi Lamas MD  Neurology  [facility] - Telemetry Stepdown    I have seen the patient and reviewed the resident's history, physical, assessment, and plan. I have personally interviewed and examined the patient at bedside and agree with the findings    José Luis Duffy MD  Department of Neurology  [facility]

## 2023-03-02 NOTE — HPI
Pt is a 51 yo female w/pmhx of cirrhosis of transplanted liver, Von Gierke disease s/p transplant, hypothyroidism, and hypertension presented to ED on 2/28 with complaints of AMS. Presented with  for worsening confusion since 2/14. At that time patient worked up by PCP for hepatic encephalopathy noted to have elevated ammonia levels (138) and placed on lactulose. On ER evaluation pt still with worsening elevated ammonia levels (156) and laboratory findings consistent with cirrhosis. Pt admitted and lactulose titrated appropriately,Rifaximin, and currently on immunosuppressive Tacrolimus for liver transplant (tac levels appropriate). Pt with extensive medical history including endoscopic complication with gastric perforation 9/2020 which subsequently led to extensive ICU course followed by months of physical rehabilitation. Pt reports since that time she has been ambulatory, but with ataxia. Pt no longer drives due to these symptoms of ataxia and balance issues. MRI from 3/1/2023 shows bilateral cerebellar infarcts. Unknown when this cerebellar stroke occurred as only prior CTH is from 9/2020 (during ICU course) which was negative for infarcts at that time. Today, patient endorses these symptoms have been present for years, but was concerned for neurological etiology as she had new-onset confusion over the last two weeks. This likely is explained by HE for which she is being treated at this time. Denies additional neurological complaints or deficits at this time.

## 2023-03-02 NOTE — SUBJECTIVE & OBJECTIVE
Interval History:   Patient seen and examined at bedside.    No acute events overnight.  She is oriented to person, place, time.  Per  at bedside, not quite at mental status baseline.  Patient has no acute complaints this morning.  Intermittently requiring pain medicine for intermittent headache and abdominal pain.  Patient notes chronic issue with migraines.  Patient updated regarding care plan.      Review of Systems   Constitutional:  Negative for chills, fatigue and fever.   HENT:  Negative for congestion, postnasal drip, rhinorrhea, sneezing and sore throat.    Eyes:  Negative for photophobia and visual disturbance.   Respiratory:  Negative for cough, choking, chest tightness and shortness of breath.    Cardiovascular:  Negative for chest pain, palpitations and leg swelling.   Gastrointestinal:  Positive for abdominal distention and abdominal pain. Negative for constipation, diarrhea, nausea and vomiting.   Endocrine: Negative for polyphagia and polyuria.   Genitourinary:  Negative for decreased urine volume, difficulty urinating, dysuria and urgency.   Musculoskeletal:  Positive for gait problem. Negative for arthralgias and myalgias.   Skin:  Negative for rash and wound.   Neurological:  Positive for dizziness, tremors, light-headedness and headaches. Negative for seizures and syncope.   Psychiatric/Behavioral:  Negative for agitation and decreased concentration.    Objective:     Vital Signs (Most Recent):  Temp: 98.5 °F (36.9 °C) (03/02/23 0745)  Pulse: 67 (03/02/23 0745)  Resp: 18 (03/02/23 0745)  BP: (!) 102/55 (03/02/23 0745)  SpO2: 100 % (03/02/23 0745)   Vital Signs (24h Range):  Temp:  [97.9 °F (36.6 °C)-99.2 °F (37.3 °C)] 98.5 °F (36.9 °C)  Pulse:  [62-80] 67  Resp:  [16-18] 18  SpO2:  [96 %-100 %] 100 %  BP: (102-125)/(51-61) 102/55     Weight: 51.7 kg (114 lb)  Body mass index is 23.03 kg/m².  No intake or output data in the 24 hours ending 03/02/23 0851   Physical Exam  Vitals and nursing  note reviewed.   Constitutional:       General: She is not in acute distress.     Appearance: She is ill-appearing (chronically). She is not toxic-appearing or diaphoretic.   HENT:      Head: Normocephalic and atraumatic.      Right Ear: External ear normal.      Left Ear: External ear normal.      Mouth/Throat:      Mouth: Mucous membranes are moist.   Eyes:      General: No scleral icterus.        Right eye: No discharge.         Left eye: No discharge.   Cardiovascular:      Rate and Rhythm: Normal rate and regular rhythm.      Pulses: Normal pulses.      Heart sounds: Normal heart sounds. No murmur heard.    No friction rub. No gallop.   Pulmonary:      Effort: Pulmonary effort is normal. No respiratory distress.      Breath sounds: No stridor. No wheezing, rhonchi or rales.   Chest:      Chest wall: No tenderness.   Abdominal:      General: Abdomen is flat. Bowel sounds are normal. There is no distension.      Palpations: There is no mass.      Tenderness: There is no abdominal tenderness. There is no guarding or rebound.      Hernia: No hernia is present.   Musculoskeletal:      Cervical back: Normal range of motion and neck supple.      Right lower leg: No edema.      Left lower leg: No edema.   Skin:     General: Skin is warm and dry.   Neurological:      Mental Status: She is alert and oriented to person, place, and time.      Sensory: No sensory deficit.      Motor: No weakness.      Comments: + mild asterixis, oriented but pressured speech   Psychiatric:         Behavior: Behavior normal.       Significant Labs: All pertinent labs within the past 24 hours have been reviewed.    Recent Results (from the past 24 hour(s))   Comprehensive Metabolic Panel (CMP)    Collection Time: 03/02/23  5:11 AM   Result Value Ref Range    Sodium 133 (L) 136 - 145 mmol/L    Potassium 3.9 3.5 - 5.1 mmol/L    Chloride 104 95 - 110 mmol/L    CO2 21 (L) 23 - 29 mmol/L    Glucose 89 70 - 110 mg/dL    BUN 22 (H) 6 - 20 mg/dL     Creatinine 1.2 0.5 - 1.4 mg/dL    Calcium 8.3 (L) 8.7 - 10.5 mg/dL    Total Protein 5.3 (L) 6.0 - 8.4 g/dL    Albumin 2.3 (L) 3.5 - 5.2 g/dL    Total Bilirubin 1.4 (H) 0.1 - 1.0 mg/dL    Alkaline Phosphatase 451 (H) 55 - 135 U/L    AST 56 (H) 10 - 40 U/L    ALT 34 10 - 44 U/L    Anion Gap 8 8 - 16 mmol/L    eGFR 53.8 (A) >60 mL/min/1.73 m^2   Magnesium    Collection Time: 03/02/23  5:11 AM   Result Value Ref Range    Magnesium 1.8 1.6 - 2.6 mg/dL   CBC Without Differential    Collection Time: 03/02/23  5:11 AM   Result Value Ref Range    WBC 3.19 (L) 3.90 - 12.70 K/uL    RBC 3.21 (L) 4.00 - 5.40 M/uL    Hemoglobin 9.8 (L) 12.0 - 16.0 g/dL    Hematocrit 29.7 (L) 37.0 - 48.5 %    MCV 93 82 - 98 fL    MCH 30.5 27.0 - 31.0 pg    MCHC 33.0 32.0 - 36.0 g/dL    RDW 15.1 (H) 11.5 - 14.5 %    Platelets 66 (L) 150 - 450 K/uL    MPV 12.1 9.2 - 12.9 fL   Tacrolimus level    Collection Time: 03/02/23  5:11 AM   Result Value Ref Range    Tacrolimus Lvl 5.9 5.0 - 15.0 ng/mL   Protime-INR    Collection Time: 03/02/23  5:11 AM   Result Value Ref Range    Prothrombin Time 11.8 9.0 - 12.5 sec    INR 1.1 0.8 - 1.2   Vitamin B12    Collection Time: 03/02/23  5:11 AM   Result Value Ref Range    Vitamin B-12 >2000 (H) 210 - 950 pg/mL         Significant Imaging: I have reviewed all pertinent imaging results/findings within the past 24 hours.    Imaging Results              US Doppler Liver Transplant Post (xpd) (Final result)  Result time 03/01/23 02:08:28   Procedure changed from US Liver with Doppler (xpd)     Final result by Clark Morales MD (03/01/23 02:08:28)                   Impression:      No evidence of portal venous thrombosis as questioned.  Satisfactory Doppler evaluation of the liver transplant.    Cirrhotic morphology of the hepatic allograft.  Questionable 2.8 cm hypoechoic area at the periphery of the left hepatic lobe which may reflect changes related to nodular contour versus possible lesion.  Consider further  evaluation with multiphase CT or MRI if clinically warranted.    Electronically signed by resident: Lazaro Pacheco  Date:    03/01/2023  Time:    01:36    Electronically signed by: Clark Morales MD  Date:    03/01/2023  Time:    02:08               Narrative:    EXAMINATION:  US DOPPLER LIVER TRANSPLANT POST (XPD)    CLINICAL HISTORY:  R/O portal vein thrombosis; R/O Ascites;    TECHNIQUE:  Limited abdominal ultrasound of the transplant liver with Doppler evaluation.  Color and spectral Doppler were performed.    COMPARISON:  Liver transplant Doppler 01/03/2023, 03/25/2022, CT abdomen pelvis 03/24/2022    FINDINGS:  Patient is status post orthotopic liver transplant in 2002.  Liver allograft is demonstrates a nodular contour with heterogeneous echotexture suggestive of cirrhosis.  2.3 x 2.5 x 2.8 cm questionable slightly hypoattenuating area in the left hepatic lobe demonstrating vascularity, which may reflect nodular changes related to cirrhosis over a left hepatic lobe lesion.  No fluid collections or significant upper abdominal ascites.    The common duct is not significantly distended for post cholecystectomy status, maximally measuring 8 mm and similar to prior noting it measured up to 7 mm on 03/25/2022.  No dilated intrahepatic radicles are seen.    Color flow and spectral waveform analysis was performed.  The main portal vein, right portal vein, left portal vein, middle hepatic vein, right hepatic vein, left hepatic vein, and IVC are patent with proper directional flow.  Peak velocity of the main portal vein is 30 cm/sec.    Anastomosis site of the main hepatic artery demonstrates a peak systolic velocity 131 cm/sec.  (Previously 137)    Main hepatic artery demonstrates resistive index 0.77 with normal waveform.  (Previously 0.79)    Left hepatic artery shows resistive index 0.75 with normal waveform.  (Previously 0.81)    Anterior branch of the right hepatic artery demonstrates resistive index 0.75 with  normal waveform.  (Previously 0.81)    Posterior branch of the right hepatic artery demonstrates resistive index 0.72 with normal waveform.  (Previously 0.79)                                       CT Soft Tissue Neck With Contrast (Final result)  Result time 02/28/23 23:47:24      Final result by Clark Morales MD (02/28/23 23:47:24)                   Impression:      No drainable abscess identified in the neck soft tissues.  Further evaluation/follow-up including direct visualization as clinically warranted.    Patulous appearance of the esophagus with fluid and air in the esophageal lumen.    Additional findings discussed in the body of the report.      Electronically signed by: Clark Morales MD  Date:    02/28/2023  Time:    23:47               Narrative:    EXAMINATION:  CT SOFT TISSUE NECK WITH CONTRAST    CLINICAL HISTORY:  Neck mass, nonpulsatile;Neck abscess, deep tissue; No additional clinical history or physical exam provided in the chart at the time of dictation.    TECHNIQUE:  CT images obtained throughout the region of the neck after the administration of 75 mL Omnipaque 350 intravenous contrast. Axial, sagittal and coronal reconstructions were performed.    COMPARISON:  CT chest, 09/04/2020.    FINDINGS:  Exam quality is limited by suboptimal positioning and motion artifact.    No abnormal fluid collection identified in the neck soft tissues.  No convincing enhancing mass identified in the neck soft tissues.  Recommend correlation with physical exam and/or direct visualization as clinically warranted.    Epiglottis is unremarkable.  The parotid and submandibular glands are symmetric without significant adjacent fat stranding.  Subcentimeter hypodensity in the left lobe of the thyroid gland, too small for dedicated follow-up.    Major vessels of the neck appear patent.  Atherosclerosis and narrowing of the bilateral carotid arteries.  Limited intracranial evaluation appears negative for acute  finding.  The visualized paranasal sinuses are essentially clear.  Postoperative changes in the right mastoid air cells.  Left mastoid air cells are essentially clear.  Postoperative changes of suboccipital craniectomy.    There is a questionable partially visualized nodule versus atelectasis or scarring in the right upper lobe (axial series 3, image 394).  This finding is likely similar when compared with prior CT dated 09/04/2020.  Lung apices are otherwise unremarkable.    Patulous appearance of the visualized esophagus with air and fluid in the mid esophageal lumen.    No aggressive osseous lesion identified.  Minimal vertebral body height loss at T3, likely chronic.  Partially visualized postoperative changes of prior median sternotomy.                                       X-Ray Chest AP Portable (Final result)  Result time 02/28/23 19:36:17      Final result by Samuel Grant MD (02/28/23 19:36:17)                   Impression:      1. No acute cardiopulmonary process.      Electronically signed by: Samuel Grant MD  Date:    02/28/2023  Time:    19:36               Narrative:    EXAMINATION:  XR CHEST AP PORTABLE    CLINICAL HISTORY:  ams;    TECHNIQUE:  Single frontal view of the chest was performed.    COMPARISON:  03/24/2022    FINDINGS:  The cardiomediastinal silhouette is not enlarged, magnified by technique.  There is no pleural effusion.  The trachea is midline.  The lungs are symmetrically expanded bilaterally without evidence of acute parenchymal process. No large focal consolidation seen.  There is no pneumothorax.  The osseous structures are remarkable for degenerative changes.  Patient is rotated..                                       CT Head Without Contrast (Final result)  Result time 02/28/23 18:54:58      Final result by González Kwok MD (02/28/23 18:54:58)                   Impression:      No evidence of acute intracranial pathology.  Additional evaluation, as clinically  warranted.    Stable multifocal parenchymal calcifications, nonspecific and potentially related to remote infectious or metabolic etiology.    Electronically signed by resident: Jakob Monteiro  Date:    02/28/2023  Time:    18:36    Electronically signed by: González Kwok MD  Date:    02/28/2023  Time:    18:54               Narrative:    EXAMINATION:  CT HEAD WITHOUT CONTRAST    CLINICAL HISTORY:  Headache, chronic, new features or increased frequency;    TECHNIQUE:  Low dose axial CT images obtained throughout the head without the use of intravenous contrast.  Axial, sagittal and coronal reconstructions were performed.    COMPARISON:  CT 09/25/2020, MRI 04/24/2015    FINDINGS:  Intracranial compartment:    The ventricular configuration appears stable from prior exam without evidence of hydrocephalus.    Brain parenchyma appears unchanged with multifocal parenchymal calcifications stable in distribution compared to the prior exam.  No parenchymal mass, hemorrhage, edema or major vascular distribution infarct. No extra-axial blood or fluid collections.    Skull/extracranial contents (limited evaluation):    Remote postoperative changes of suboccipital craniectomy.  Prior right mastoidectomy.  Left mastoid air cells are clear.  Visualized paranasal sinuses are clear.

## 2023-03-02 NOTE — PT/OT/SLP EVAL
Physical Therapy Co-Evaluation and Co-Treatment  Co-Tx with OT due to decreased activity tolerance and anticipated level of skilled assistance required for functional mobility    Patient Name:  Elda Hernandez   MRN:  [id number]    Recommendations:     Discharge Recommendations: other (see comments) (continued skilled PT intervention is recommend following discharge from acute care services) Presence of another during all OOB activity.  Discharge Equipment Recommendations: bedside commode   Barriers to discharge: Inaccessible home and Decreased caregiver support    Assessment:     Elda Hernandez is a 52 y.o. female admitted with a medical diagnosis of Decompensated liver disease.  She presents with the following impairments/functional limitations: weakness, impaired endurance, impaired functional mobility, gait instability, impaired balance, impaired cognition, decreased lower extremity function, decreased safety awareness, pain, impaired cardiopulmonary response to activity.    Patient demos active participation in eval with intermittent agitation and confusion requiring gentle clarification and redirection to task, as well as cueing to attend to safety awareness with impulsivity demonstrated during transfers and ambulation. Patient requires supervision sup>sit, CGA to stand from EOB and toilet and CGA to amb 30' in room using RW requiring consistent BUE support to maintain static and dynamic standing balance. Pt has strong social support from  and 20yo daughter. Patient presents below PLOF and would benefit from acute care skilled PT to address deficits detailed above and promote safe and functional independence.    Rehab Prognosis: Good; patient would benefit from acute skilled PT services to address these deficits and reach maximum level of function.    Recent Surgery: * No surgery found *      Plan:     During this hospitalization, patient to be seen 3 x/week to address the identified rehab impairments  "via gait training, therapeutic activities, therapeutic exercises, neuromuscular re-education and progress toward the following goals:    Plan of Care Expires:  03/23/23    Subjective     Chief Complaint: "I can't walk - I already walked to the bathroom and am unsteady!"  Patient/Family Comments/goals: Improve liver and brain health  Pain/Comfort:  Pain Rating 1: 4/10  Location - Side 1: Bilateral  Location - Orientation 1: generalized  Location 1: abdomen  Pain Addressed 1: Reposition, Distraction, Pre-medicate for activity, Cessation of Activity  Pain Rating Post-Intervention 1: other (see comments) (did not rate)  Pain Rating 2: 4/10  Location - Side 2: Bilateral  Location - Orientation 2: generalized  Location 2: head  Pain Addressed 2: Pre-medicate for activity, Cessation of Activity, Reposition, Distraction  Pain Rating Post-Intervention 2: other (see comments) (did not rate)    Patients cultural, spiritual, Advent conflicts given the current situation: no    Living Environment:  Patient lives with spouse and 20yo daughter, 12yo son in [facility] with 1 PENNY, tub/shower combo.   Prior to admission, patients level of function was JESS however endorses "holding on to someone" or furniture walking, endorses 5+ falls in past 3 mo due to LE's "giving out".  Equipment used at home: walker, rolling, wheelchair, shower chair, grab bar.  DME owned (not currently used): none.  Upon discharge, patient will have assistance from spouse (works) and daughter (works until 1PM).    Objective:     Communicated with Rn, OT prior to session.  Patient found  cross legged sitting up in bed  with peripheral IV, telemetry  upon PT entry to room.  at bedside.    General Precautions: Standard, fall  Orthopedic Precautions:N/A   Braces: N/A  Respiratory Status: Room air    Exams:  Cognitive Exam:  Patient is oriented to Person, Place, Time, and Situation  Postural Exam:  Patient presented with the following abnormalities:    -       " Rounded shoulders  -       Kyphosis  Sensation:    -       Intact  Skin Integrity/Edema:      -       Skin integrity: Visible skin intact  RLE ROM: WFL  RLE Strength: Deficits: grossly 4-/5  LLE ROM: WFL  LLE Strength: Deficits: grossly 4-/5    Functional Mobility:  Bed Mobility:   flat bed no bedrails  Rolling Left:  supervision  Rolling Right: supervision  Scooting: supervision  Supine to Sit: supervision  Sit to Supine: supervision  Transfers:   100% VC for safe hand placement and sequencing  Sit to Stand:  contact guard assistance with rolling walker  Toilet Transfer: contact guard assistance with  rolling walker and grab bars  using  Stand Pivot  Gait: pt ambulates 10', 30' respectively using RW requiring CGA for dynamic stabiltiy and VC for safe RW placement and wayfinding, self regulation of fatigue levels. Pt amb with multidirectional postural instability at trunk, BLE weakness (no evidenced buckling), dec step length B, impulsive ambulation with dec safety awareness re fall risk/RW mgmt  Balance: standing at EOB requires CGA and BUE support at RW or HHAx2 to prevent LOB      AM-PAC 6 CLICK MOBILITY  Total Score:18       Treatment & Education:  Performed functional mobility training detailed above.     Patient and spouse educated on role of physical therapy, PT POC, importance of OOB activity, safe functional mobility techniques, safe use of RW, use of call light, presence of staff for all out of bed mobility at this time. Allotted time for all questions to be answered. Patient verbalizes understanding of education provided.     Patient left supine with all lines intact, call button in reach, and spouse present.    GOALS:   Multidisciplinary Problems       Physical Therapy Goals          Problem: Physical Therapy    Goal Priority Disciplines Outcome Goal Variances Interventions   Physical Therapy Goal     PT, PT/OT Ongoing, Progressing     Description: Goals to be met by: 3/23/2023    Patient will increase  functional independence with mobility by performin. Supine to sit with Modified [place]  2. Sit to supine with Modified [place]  3. Sit to stand transfer with Stand-by Assistance  4. Bed to chair transfer with Stand-by Assistance using Rolling Walker/LRAD  5. Gait  x 100 feet with Stand-by Assistance using Rolling Walker/LRAD.   6. Ascend/Descend 6 inch curb step with Contact Guard Assistance using Rolling Walker/LRAD.                         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    Arnold-Chiari malformation     Depression     Esophageal stricture     Essential tremor     Hypertension     Left bundle branch block     Liver fibrosis, transplanted liver 10/2/2018    Suggested on fibroscan 10/2/18    Migraine without aura     MVP (mitral valve prolapse)     Non-rheumatic mitral regurgitation 10/1/2018    Non-rheumatic tricuspid valve insufficiency 10/1/2018    Osteoporosis     Recurrent urinary tract infection     Seizures     Shingles     SIADH (syndrome of inappropriate ADH production)     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    Tricuspid valve prolapse     Urolithiasis     Von Gierke disease     s/p liver transplant       Past Surgical History:   Procedure Laterality Date    APPENDECTOMY  2007    APPLICATION OF WOUND VACUUM-ASSISTED CLOSURE DEVICE N/A 2020    Procedure: APPLICATION, WOUND VAC;  Surgeon: Zain Decker MD;  Location: [facility] OR [address] [place] [address];  Service: General;  Laterality: N/A;    COLONOSCOPY  2008    internal hemorrhoids    CRANIOTOMY      ESOPHAGOGASTRODUODENOSCOPY N/A 9/3/2020    Procedure: EGD (ESOPHAGOGASTRODUODENOSCOPY);  Surgeon: Tyrel Vergara MD;  Location: [facility];  Service: Endoscopy;  Laterality: N/A;    EXPLORATORY LAPAROTOMY      due to perforated stomach    LAMINECTOMY  3/2001    LIVER TRANSPLANT  2002    OSSICULAR RECONSTRUCTION  10/4/1995    RIGHT REPLACEMENT PROSTHESIS for  cholesteatoma    THYMECTOMY  5/2/2007    TONSILLECTOMY, ADENOIDECTOMY  1/21/2004    TOTAL ABDOMINAL HYSTERECTOMY  3/31/1994       Time Tracking:     PT Received On: 03/02/23  PT Start Time: 0935     PT Stop Time: 0957  PT Total Time (min): 22 min     Billable Minutes: Evaluation 10 and Therapeutic Activity 12      03/02/2023

## 2023-03-02 NOTE — ASSESSMENT & PLAN NOTE
Patient used to take demeclocycline at home. Patient's sodium level within normal limits upon admission. Will continue to trend.  Na 133 today

## 2023-03-02 NOTE — TREATMENT PLAN
Hepatology Treatment Plan    Elda Hernandez is a 52 y.o. female admitted to hospital 2/28/2023 (Hospital Day: 3) due to Decompensated liver disease.     Interval History  Unchanged mental status this morning, persistent headache and gait instability. Worsening renal function today.    Objective  Temp:  [97.9 °F (36.6 °C)-99.2 °F (37.3 °C)] 98.5 °F (36.9 °C) (03/02 0745)  Pulse:  [62-80] 67 (03/02 0745)  BP: (102-125)/(51-61) 102/55 (03/02 0745)  Resp:  [16-18] 18 (03/02 0745)  SpO2:  [96 %-100 %] 100 % (03/02 0745)    General: Alert, Oriented x3, no distress  Neurologic: Asterixis minimal  Abdomen: Normoactive bowel sounds. Non-distended. Normal tympany. Soft. Non-tender. No peritoneal signs.    Laboratory    Lab Results   Component Value Date    WBC 3.19 (L) 03/02/2023    HGB 9.8 (L) 03/02/2023    HCT 29.7 (L) 03/02/2023    MCV 93 03/02/2023    PLT 66 (L) 03/02/2023       Lab Results   Component Value Date     (L) 03/02/2023    K 3.9 03/02/2023     03/02/2023    CO2 21 (L) 03/02/2023    BUN 22 (H) 03/02/2023    CREATININE 1.2 03/02/2023    CALCIUM 8.3 (L) 03/02/2023       Lab Results   Component Value Date    ALBUMIN 2.3 (L) 03/02/2023    ALT 34 03/02/2023    AST 56 (H) 03/02/2023     (H) 04/07/2018    ALKPHOS 451 (H) 03/02/2023    BILITOT 1.4 (H) 03/02/2023       Lab Results   Component Value Date    INR 1.1 03/02/2023    INR 1.1 02/28/2023    INR 1.1 12/10/2022       MELD-Na score: 11 at 3/2/2023  5:11 AM  MELD score: 11 at 3/2/2023  5:11 AM  Calculated from:  Serum Creatinine: 1.2 mg/dL at 3/2/2023  5:11 AM  Serum Sodium: 133 mmol/L at 3/2/2023  5:11 AM  Total Bilirubin: 1.4 mg/dL at 3/2/2023  5:11 AM  INR(ratio): 1.1 at 3/2/2023  5:11 AM  Age: 52 years    Clinical examination consistent with hepatic encephalopathy given asterixis but suspect other underlying cause given neurologic symptoms (headache, gait instability) and evidence of chronic cerebellar infarcts.    Liver lesion seen on  ultrasound liver not seen well on single phase CT, given worsening renal function will recommend MRI.    Plan  - continue lactulose, titrate to 3-4 BM daily  - continue rifaximin  - continue tacrolimus 0.5 mg BID (tacrolimus level 5.9)   - please obtain daily CBC, BMP, LFT, INR  - follow up MRI to evaluate liver mass seen on liver ultrasound  - consider evaluation with neurology    Thank you for involving us in the care of Elda Hernandez. Please call with any additional concerns or questions.    Darshan Cruz MD

## 2023-03-02 NOTE — ASSESSMENT & PLAN NOTE
- Neurological consult for ataxia  - Pt with complaints of chronic balance issues leading to patient with decreased inability of ADL's such as driving  - Physical exam findings consistent with cerebellar infarct. Pt with dysdiadochokinesia, lateral nystagmus present, failed heel/shin, finger/nose exam  - MRI head with bilateral cerebellar infarct  - Prior CTH 9/2020 on file negative for infarcts at that time   - Labs reviewed, no obvious metabolic derangements to account for ataxia including normal B12 levels. MRI with objective findings of cerebellar infarcts likely accounting for physical presentation     PLAN  - Pt would benefit from outpatient physical/vestibular therapy    - For completion of workup, consider addition of Vit D, E, and heavy metal labs

## 2023-03-02 NOTE — PT/OT/SLP EVAL
Occupational Therapy   Co-Evaluation w/ PT    Name: Elda Hernandez  MRN: [id number]  Admitting Diagnosis: Decompensated liver disease  Recent Surgery: * No surgery found *      Co-treat performed due to acuity and complexity of pt's medical status with 2 skilled disciplines needed to optimize pts occupational performance and  decreased activity tolerance.     Recommendations:     Discharge Recommendations:  (Pt recommending additonal OT services post acute d/c)  Discharge Equipment Recommendations:  bedside commode  Barriers to discharge:       Assessment:     Elda Hernandez is a 52 y.o. female with a medical diagnosis of Decompensated liver disease.  Upon therapist arrival, Pt was in bed with HOB elevated and  at bedside. On this date, demonstrates intermittent moments of agitation and confusion, but is agreeable to evaluation. Pt demonstrates ROM WFL, but demonstrates deficits in UE strength, 3+/5.  Pt required  Supervision for bed mobility, and CGA & RW for functional mobility.  Pt  is noted  AAOx  but with decreased safety awareness and impulsivity this session, requiring verbal redirection for increased safety. At this time, Pt is performing below baseline, requiring assistance with ADLs and functional mobility. Pt's poor safety awareness  and endurance limit occupational performance at this time. Pt is currently unable to assume prior life roles, requiring further therapy services. She presents with deficits listed below. Performance deficits affecting function: weakness, impaired cognition, impaired functional mobility, decreased safety awareness, impaired self care skills, impaired endurance, gait instability, impaired balance, decreased coordination.      Rehab Prognosis: Good; patient would benefit from acute skilled OT services to address these deficits and reach maximum level of function.       Plan:     Patient to be seen 3 x/week to address the above listed problems via self-care/home  "management, therapeutic activities, therapeutic exercises  Plan of Care Expires: 04/01/23  Plan of Care Reviewed with: patient, spouse    Subjective     Chief Complaint: "I can move but are yall going to fix whats wrong with my brain and liver, that's the problem"  Patient/Family Comments/goals: Return home functioning independently. Per , "get her stronger and more mobile"    Occupational Profile:  Living Environment: pt lives with  in a [facility] with 1 small step to enter. Pt has  T/S with grab bars placed in shower and in  toilet room.   Previous level of function: Pt and  report Mod (I) in ADLs and  functional mobility.  reports Pt often reaches for the wall when walking.   reports 5 falls within last 3 months.   Roles and Routines: Pt is mother of 2 and wife who no longer drives  Equipment Used at Home: walker, rolling, wheelchair, grab bar, shower chair  Assistance upon Discharge: Pt's  and adult daughter will be able to assist when not at work.    Pain/Comfort:  Pain Rating 1: 4/10  Location - Orientation 1: generalized  Location 1: abdomen  Pain Addressed 1: Reposition, Distraction, Cessation of Activity, Pre-medicate for activity  Pain Rating Post-Intervention 1:  (did not rate pain)  Pain Rating 2: 4/10  Location - Orientation 2: generalized  Location 2: head  Pain Addressed 2: Pre-medicate for activity, Distraction, Cessation of Activity    Patients cultural, spiritual, Adventist conflicts given the current situation: no    Objective:     Communicated with: Nurse Susy prior to session.  Patient found  long sitting in bed  with telemetry, peripheral IV upon OT entry to room.    General Precautions: Standard, fall  Orthopedic Precautions: N/A  Braces: N/A  Respiratory Status: Room air    Occupational Performance:    Bed Mobility:    Patient completed Rolling/Turning to Left with  supervision  Patient completed Rolling/Turning to Right with supervision  Patient " completed Scooting/Bridging with supervision  Patient completed Supine to Sit with supervision  Patient completed Sit to Supine with supervision    Functional Mobility/Transfers:  Patient completed Sit <> Stand Transfer with contact guard assistance  with  hand-held assist   Patient completed Toilet Transfer Step Transfer technique with contact guard assistance with  rolling walker  Functional Mobility: Pt walked ~ 40' with RW and CGA, with Pt reporting fatigue and requesting to return to bed.     Activities of Daily Living:  Grooming: stand by assistance while in bed  Lower Body Dressing: independence while long sitting in bed.    Cognitive/Visual Perceptual:  Cognitive/Psychosocial Skills:     -       Oriented to: Person, Place, Time, and Situation   -       Follows Commands/attention:Follows one-step commands  -       Communication: clear/fluent  -       Safety awareness/insight to disability: impaired   -       Mood/Affect/Coping skills/emotional control: Appropriate to situation and Cooperative    Physical Exam:  Dominant hand:    -       Right  Upper Extremity Range of Motion:  -       Right Upper Extremity: WFL  -       Left Upper Extremity: WFL  Upper Extremity Strength: -       Right Upper Extremity: 3+/5  -       Left Upper Extremity: 3+/5   Strength:    -       Right Upper Extremity: WNL  -       Left Upper Extremity: WNL    AMPAC 6 Click ADL:  AMPAC Total Score: 22    Treatment & Education:  Role of OT and OT POC  Fall Prevention/Safety Awareness Training-  cueing for improved RW management.  Transfer Training on use of grab bars during t/fs to reduce fall risk.   Educated on  continued therapy participation  & OOB activity with staff and impact on increasing functional independence.      Patient left HOB elevated with all lines intact, call button in reach, bed alarm on, and  present    GOALS:   Multidisciplinary Problems       Occupational Therapy Goals          Problem: Occupational Therapy     Goal Priority Disciplines Outcome Interventions   Occupational Therapy Goal     OT, PT/OT Ongoing, Progressing    Description: Goals to be met by: 3/16/2023     Patient will increase functional independence with ADLs by performing:    UE Dressing with Modified [place].  Grooming while standing at sink with Modified [place].  Toileting from toilet with Modified [place] for hygiene and clothing management.   Toilet transfer to toilet with Modified [place].                         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    Arnold-Chiari malformation     Depression     Esophageal stricture     Essential tremor     Hypertension     Left bundle branch block     Liver fibrosis, transplanted liver 10/2/2018    Suggested on fibroscan 10/2/18    Migraine without aura     MVP (mitral valve prolapse)     Non-rheumatic mitral regurgitation 10/1/2018    Non-rheumatic tricuspid valve insufficiency 10/1/2018    Osteoporosis     Recurrent urinary tract infection     Seizures     Shingles 2007    SIADH (syndrome of inappropriate ADH production)     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    Tricuspid valve prolapse     Urolithiasis     Von Gierke disease     s/p liver transplant         Past Surgical History:   Procedure Laterality Date    APPENDECTOMY  6/22/2007    APPLICATION OF WOUND VACUUM-ASSISTED CLOSURE DEVICE N/A 9/18/2020    Procedure: APPLICATION, WOUND VAC;  Surgeon: Zain Decker MD;  Location: [address];  Service: General;  Laterality: N/A;    COLONOSCOPY  5/13/2008    internal hemorrhoids    CRANIOTOMY      ESOPHAGOGASTRODUODENOSCOPY N/A 9/3/2020    Procedure: EGD (ESOPHAGOGASTRODUODENOSCOPY);  Surgeon: Tyrel Vergara MD;  Location: [facility];  Service: Endoscopy;  Laterality: N/A;    EXPLORATORY LAPAROTOMY  2020    due to perforated stomach    LAMINECTOMY  3/2001    LIVER TRANSPLANT  9/23/2002    OSSICULAR RECONSTRUCTION  10/4/1995     RIGHT REPLACEMENT PROSTHESIS for cholesteatoma    THYMECTOMY  5/2/2007    TONSILLECTOMY, ADENOIDECTOMY  1/21/2004    TOTAL ABDOMINAL HYSTERECTOMY  3/31/1994       Time Tracking:     OT Date of Treatment: 03/02/23  OT Start Time: 0936  OT Stop Time: 0959  OT Total Time (min): 23 min    Billable Minutes:Evaluation 10  Self Care/Home Management 13    3/2/2023

## 2023-03-03 LAB
ALBUMIN SERPL BCP-MCNC: 2.3 G/DL (ref 3.5–5.2)
ALP SERPL-CCNC: 454 U/L (ref 55–135)
ALT SERPL W/O P-5'-P-CCNC: 33 U/L (ref 10–44)
ANION GAP SERPL CALC-SCNC: 7 MMOL/L (ref 8–16)
AST SERPL-CCNC: 56 U/L (ref 10–40)
BILIRUB SERPL-MCNC: 1.4 MG/DL (ref 0.1–1)
BUN SERPL-MCNC: 21 MG/DL (ref 6–20)
CALCIUM SERPL-MCNC: 8.3 MG/DL (ref 8.7–10.5)
CHLORIDE SERPL-SCNC: 100 MMOL/L (ref 95–110)
CO2 SERPL-SCNC: 24 MMOL/L (ref 23–29)
CREAT SERPL-MCNC: 1.4 MG/DL (ref 0.5–1.4)
ERYTHROCYTE [DISTWIDTH] IN BLOOD BY AUTOMATED COUNT: 14.7 % (ref 11.5–14.5)
EST. GFR  (NO RACE VARIABLE): 44.7 ML/MIN/1.73 M^2
GLUCOSE SERPL-MCNC: 83 MG/DL (ref 70–110)
HCT VFR BLD AUTO: 27.9 % (ref 37–48.5)
HGB BLD-MCNC: 9 G/DL (ref 12–16)
INR PPP: 1.1 (ref 0.8–1.2)
MAGNESIUM SERPL-MCNC: 2 MG/DL (ref 1.6–2.6)
MCH RBC QN AUTO: 29.8 PG (ref 27–31)
MCHC RBC AUTO-ENTMCNC: 32.3 G/DL (ref 32–36)
MCV RBC AUTO: 92 FL (ref 82–98)
PLATELET # BLD AUTO: 57 K/UL (ref 150–450)
PMV BLD AUTO: 12.2 FL (ref 9.2–12.9)
POCT GLUCOSE: 89 MG/DL (ref 70–110)
POTASSIUM SERPL-SCNC: 4.2 MMOL/L (ref 3.5–5.1)
PROT SERPL-MCNC: 5.1 G/DL (ref 6–8.4)
PROTHROMBIN TIME: 11.7 SEC (ref 9–12.5)
RBC # BLD AUTO: 3.02 M/UL (ref 4–5.4)
SODIUM SERPL-SCNC: 131 MMOL/L (ref 136–145)
TACROLIMUS BLD-MCNC: 6.8 NG/ML (ref 5–15)
WBC # BLD AUTO: 2.71 K/UL (ref 3.9–12.7)

## 2023-03-03 PROCEDURE — 63600175 PHARM REV CODE 636 W HCPCS: Performed by: STUDENT IN AN ORGANIZED HEALTH CARE EDUCATION/TRAINING PROGRAM

## 2023-03-03 PROCEDURE — 25000003 PHARM REV CODE 250: Performed by: STUDENT IN AN ORGANIZED HEALTH CARE EDUCATION/TRAINING PROGRAM

## 2023-03-03 PROCEDURE — 94761 N-INVAS EAR/PLS OXIMETRY MLT: CPT

## 2023-03-03 PROCEDURE — 20600001 HC STEP DOWN PRIVATE ROOM

## 2023-03-03 PROCEDURE — 99232 PR SUBSEQUENT HOSPITAL CARE,LEVL II: ICD-10-PCS | Mod: ,,, | Performed by: STUDENT IN AN ORGANIZED HEALTH CARE EDUCATION/TRAINING PROGRAM

## 2023-03-03 PROCEDURE — 80053 COMPREHEN METABOLIC PANEL: CPT | Performed by: STUDENT IN AN ORGANIZED HEALTH CARE EDUCATION/TRAINING PROGRAM

## 2023-03-03 PROCEDURE — 99900035 HC TECH TIME PER 15 MIN (STAT)

## 2023-03-03 PROCEDURE — 25500020 PHARM REV CODE 255: Performed by: STUDENT IN AN ORGANIZED HEALTH CARE EDUCATION/TRAINING PROGRAM

## 2023-03-03 PROCEDURE — 83735 ASSAY OF MAGNESIUM: CPT | Performed by: STUDENT IN AN ORGANIZED HEALTH CARE EDUCATION/TRAINING PROGRAM

## 2023-03-03 PROCEDURE — 36415 COLL VENOUS BLD VENIPUNCTURE: CPT | Performed by: STUDENT IN AN ORGANIZED HEALTH CARE EDUCATION/TRAINING PROGRAM

## 2023-03-03 PROCEDURE — 99232 SBSQ HOSP IP/OBS MODERATE 35: CPT | Mod: ,,, | Performed by: STUDENT IN AN ORGANIZED HEALTH CARE EDUCATION/TRAINING PROGRAM

## 2023-03-03 PROCEDURE — 85610 PROTHROMBIN TIME: CPT | Performed by: STUDENT IN AN ORGANIZED HEALTH CARE EDUCATION/TRAINING PROGRAM

## 2023-03-03 PROCEDURE — 85027 COMPLETE CBC AUTOMATED: CPT | Performed by: STUDENT IN AN ORGANIZED HEALTH CARE EDUCATION/TRAINING PROGRAM

## 2023-03-03 PROCEDURE — 80197 ASSAY OF TACROLIMUS: CPT | Performed by: STUDENT IN AN ORGANIZED HEALTH CARE EDUCATION/TRAINING PROGRAM

## 2023-03-03 PROCEDURE — A9585 GADOBUTROL INJECTION: HCPCS | Performed by: STUDENT IN AN ORGANIZED HEALTH CARE EDUCATION/TRAINING PROGRAM

## 2023-03-03 RX ORDER — OXYCODONE HYDROCHLORIDE 10 MG/1
10 TABLET ORAL EVERY 4 HOURS PRN
Status: DISCONTINUED | OUTPATIENT
Start: 2023-03-03 | End: 2023-03-06

## 2023-03-03 RX ORDER — LACTULOSE 10 G/15ML
30 SOLUTION ORAL 3 TIMES DAILY
Status: DISCONTINUED | OUTPATIENT
Start: 2023-03-03 | End: 2023-03-06 | Stop reason: HOSPADM

## 2023-03-03 RX ADMIN — HYDROMORPHONE HYDROCHLORIDE 0.5 MG: 1 INJECTION, SOLUTION INTRAMUSCULAR; INTRAVENOUS; SUBCUTANEOUS at 03:03

## 2023-03-03 RX ADMIN — TACROLIMUS 0.5 MG: 0.5 CAPSULE ORAL at 07:03

## 2023-03-03 RX ADMIN — ONDANSETRON 4 MG: 2 INJECTION INTRAMUSCULAR; INTRAVENOUS at 05:03

## 2023-03-03 RX ADMIN — PRAVASTATIN SODIUM 10 MG: 10 TABLET ORAL at 09:03

## 2023-03-03 RX ADMIN — PANTOPRAZOLE SODIUM 40 MG: 40 TABLET, DELAYED RELEASE ORAL at 08:03

## 2023-03-03 RX ADMIN — ONDANSETRON 4 MG: 2 INJECTION INTRAMUSCULAR; INTRAVENOUS at 09:03

## 2023-03-03 RX ADMIN — LACTULOSE 30 G: 20 SOLUTION ORAL at 03:03

## 2023-03-03 RX ADMIN — LACTULOSE 20 G: 20 SOLUTION ORAL at 08:03

## 2023-03-03 RX ADMIN — VENLAFAXINE HYDROCHLORIDE 150 MG: 150 CAPSULE, EXTENDED RELEASE ORAL at 08:03

## 2023-03-03 RX ADMIN — HYDROMORPHONE HYDROCHLORIDE 0.5 MG: 1 INJECTION, SOLUTION INTRAMUSCULAR; INTRAVENOUS; SUBCUTANEOUS at 07:03

## 2023-03-03 RX ADMIN — RIFAXIMIN 550 MG: 550 TABLET ORAL at 08:03

## 2023-03-03 RX ADMIN — Medication 6 MG: at 09:03

## 2023-03-03 RX ADMIN — TACROLIMUS 0.5 MG: 0.5 CAPSULE ORAL at 05:03

## 2023-03-03 RX ADMIN — ONDANSETRON 4 MG: 2 INJECTION INTRAMUSCULAR; INTRAVENOUS at 07:03

## 2023-03-03 RX ADMIN — BUTALBITAL, ACETAMINOPHEN, AND CAFFEINE 1 TABLET: 325; 50; 40 TABLET ORAL at 06:03

## 2023-03-03 RX ADMIN — OXYCODONE HYDROCHLORIDE 10 MG: 10 TABLET ORAL at 09:03

## 2023-03-03 RX ADMIN — LEVOTHYROXINE SODIUM 75 MCG: 75 TABLET ORAL at 06:03

## 2023-03-03 RX ADMIN — LACTULOSE 30 G: 20 SOLUTION ORAL at 09:03

## 2023-03-03 RX ADMIN — RIFAXIMIN 550 MG: 550 TABLET ORAL at 09:03

## 2023-03-03 RX ADMIN — HYDROMORPHONE HYDROCHLORIDE 0.5 MG: 1 INJECTION, SOLUTION INTRAMUSCULAR; INTRAVENOUS; SUBCUTANEOUS at 09:03

## 2023-03-03 RX ADMIN — PROMETHAZINE HYDROCHLORIDE 25 MG: 25 TABLET ORAL at 03:03

## 2023-03-03 NOTE — ASSESSMENT & PLAN NOTE
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  Encephalopathy    Patient presenting with confusion and disorientation with notable flapping tremor concerning for hepatic encephalopathy. 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 Patient also with abdominal distention and generalized abdominal pain concerning for underlying ascites. She denies any recent GI bleeding. Follows with Liver Transplant clinic as an outpatient.    - Hepatology consulted, appreciate recommendations  -During IR ultrasound evaluation, no ascites identified for safe paracentesis  -U/S liver w/ Questionable 2.8 cm hypoechoic area at the periphery of the left hepatic lobe; CT triple phase to further evaluate inconclusive; discussed with hepatology, MRI abdomen pending  - lactulose 30 g TID  - rifaximin 500 mg BID    MELD-Na score: 18 at 3/3/2023  4:39 AM  MELD score: 12 at 3/3/2023  4:39 AM  Calculated from:  Serum Creatinine: 1.4 mg/dL at 3/3/2023  4:39 AM  Serum Sodium: 131 mmol/L at 3/3/2023  4:39 AM  Total Bilirubin: 1.4 mg/dL at 3/3/2023  4:39 AM  INR(ratio): 1.1 at 3/3/2023  4:39 AM  Age: 52 years

## 2023-03-03 NOTE — PLAN OF CARE
[facility] - Telemetry Stepdown  Discharge Reassessment    Primary Care Provider: David Lorenzo MD    Expected Discharge Date: 3/4/2023    Reassessment (most recent)       Discharge Reassessment - 03/03/23 1428          Discharge Reassessment    Assessment Type Discharge Planning Reassessment     Did the patient's condition or plan change since previous assessment? No     Discharge Plan discussed with: Spouse/sig other;Patient     Communicated MARILEE with patient/caregiver Yes     Discharge Plan A Home with family;Home Health     Discharge Plan B Home with family     DME Needed Upon Discharge  none     Discharge Barriers Identified None     Why the patient remains in the hospital Requires continued medical care     Discharge Plan A Home with family;Home Health        Post-Acute Status    Post-Acute Authorization Home Health     Home Health Status Referrals Sent     Hospital Resources/Appts/Education Provided Appointments scheduled by Navigator/Coordinator     Discharge Delays None known at this time                 Spoke with patient regarding recommendations for HH. Spouse requested for SW to contact her  as she is unable to recall her previous HH company. Spoke with  who was unsure of the HH patient had in the past, however, no particular preference for a HH as long as it is in network with her insurance.    Referral sent to [facility].    2:53 PM  [facility]- accepted    Giana Gerber LCSW [facility]  Ext. [phone]

## 2023-03-03 NOTE — PROGRESS NOTES
"[facility] Medicine  Progress Note    Patient Name: Elda Hernandez  MRN: [id number]  Patient Class: IP- Inpatient   Admission Date: 2/28/2023  Length of Stay: 3 days  Attending Physician: Lizeth Worley MD  Primary Care Provider: David Lorenzo MD        Subjective:     Principal Problem:Decompensated liver disease        HPI:  Elda Hernandez is a 52-year-old woman a past medical history of primary hypertension, cirrhosis of transplanted liver, Von Gierke disease s/p transplant, and hypothyroidism, who presents to the emergency department with altered mental status. The patient is accompanied by her  who assists in providing the history. Per report, the patient has been confused and repetitive with her speech per her 's report for the past week. The patient follows closely with the Liver Transplant clinic and recently had her ammonia checked given the confusion and it was noted that it was elevated. She was recently started on lactulose by her provider and states that she has been having at least one bowel movement per day with the exception of yesterday when the patient said she had multiple episodes of loose, runny stool. She also notes increasing abdominal distention and generalized abdominal pain. The patient says that the last time she was hospitalized, a paracentesis was attempted but was not successful given there was not a pocket of fluid that was able to be drained. The patient denies any fevers, chills or rigors but states that she often does not become febrile because of her immunosuppression. Additionally, the patient complains of a left-sided neck mass that has been present for "some time." She says that her primary care physician is aware of the mass and that he ordered an ultrasound to further investigate the mass but says that it has not yet been completed. She also complains of a severe headache that is generalized. She denies any chest pain, shortness of " "breath or dyspnea on exertion.    In the emergency department, the patient was noted to be have stable vital signs upon arrival. Her labs were significant for a chronic neutropenia and thrombocytopenia with platelet count of 54. Potassium was noted to be 3.3. Magnesium was 1.5. The patient's liver enzymes elevated with AST 77 ALT 52 and alkaline phosphatase 582. Bilirubin at 2.3. Ammonia at 56. A CT head was ordered which revealed no acute abnormality. The patient was admitted to Hospital Medicine for further management.      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 Asterixis noted on examination. Mildly elevated LFTs. Started on lactulose and rifaximin. Per IR, During ultrasound evaluation, no ascites identified for safe paracentesis.  Liver ultrasound with Doppler without evidence of portal venous thrombosis and without significant ascites but notable for 2.8 cm hypo attenuation.  MRI brain W WO with subtle findings concerning for hepatic encephalopathy though other toxic or metabolic derangement or encephalitis could have similar appearance; no acute process.  Also notable for Mild chronic small vessel ischemic change with multiple small remote bilateral cerebellar infarcts.  Discussed with hepatology, and concern that encephalopathy not solely due to hepatic encephalopathy.  Neurology consulted.  CT abdomen pelvis with contrast completed. "Mild interval increase in size of hypodense hepatic nodule.  The questionable lesion identified on ultrasound not confidently seen on this single phase exam though the nodular contour of the liver appears similar prior CT.  Triple phase liver study can be repeated at no additional charge. "Discussed with hepatology.  MRI abdomen ordered to further " evaluate.      Interval History:   Patient seen and examined at bedside.    No acute events overnight.  She is oriented to person, place, time.  Still notes occasional memory deficits. Discussed amb referral to Neuropsych.  Intermittently requiring pain medicine for HA.   Patient updated regarding care plan. Awaiting MRI.      Review of Systems   Constitutional:  Negative for chills, fatigue and fever.   HENT:  Negative for congestion, postnasal drip, rhinorrhea, sneezing and sore throat.    Eyes:  Negative for photophobia and visual disturbance.   Respiratory:  Negative for cough, choking, chest tightness and shortness of breath.    Cardiovascular:  Negative for chest pain, palpitations and leg swelling.   Gastrointestinal:  Negative for abdominal distention, abdominal pain, constipation, diarrhea, nausea and vomiting.   Endocrine: Negative for polyphagia and polyuria.   Genitourinary:  Negative for decreased urine volume, difficulty urinating, dysuria and urgency.   Musculoskeletal:  Positive for gait problem. Negative for arthralgias and myalgias.   Skin:  Negative for rash and wound.   Neurological:  Positive for dizziness, tremors, light-headedness and headaches. Negative for seizures and syncope.   Psychiatric/Behavioral:  Negative for agitation and decreased concentration.    Objective:     Vital Signs (Most Recent):  Temp: 98.5 °F (36.9 °C) (03/03/23 1123)  Pulse: 65 (03/03/23 1123)  Resp: 18 (03/03/23 1123)  BP: (!) 145/70 (03/03/23 1123)  SpO2: 100 % (03/03/23 1123)   Vital Signs (24h Range):  Temp:  [96.1 °F (35.6 °C)-98.5 °F (36.9 °C)] 98.5 °F (36.9 °C)  Pulse:  [62-71] 65  Resp:  [16-20] 18  SpO2:  [98 %-100 %] 100 %  BP: (110-149)/(55-70) 145/70     Weight: 52.6 kg (116 lb)  Body mass index is 23.43 kg/m².  No intake or output data in the 24 hours ending 03/03/23 1219   Physical Exam  Vitals and nursing note reviewed.   Constitutional:       General: She is not in acute distress.     Appearance: She is  ill-appearing (chronically). She is not toxic-appearing or diaphoretic.   HENT:      Head: Normocephalic and atraumatic.      Right Ear: External ear normal.      Left Ear: External ear normal.      Mouth/Throat:      Mouth: Mucous membranes are moist.   Eyes:      General: No scleral icterus.        Right eye: No discharge.         Left eye: No discharge.   Cardiovascular:      Rate and Rhythm: Normal rate and regular rhythm.      Pulses: Normal pulses.      Heart sounds: Normal heart sounds. No murmur heard.    No friction rub. No gallop.   Pulmonary:      Effort: Pulmonary effort is normal. No respiratory distress.      Breath sounds: No stridor. No wheezing, rhonchi or rales.   Chest:      Chest wall: No tenderness.   Abdominal:      General: Abdomen is flat. Bowel sounds are normal. There is no distension.      Palpations: There is no mass.      Tenderness: There is no abdominal tenderness. There is no guarding or rebound.      Hernia: No hernia is present.   Musculoskeletal:      Cervical back: Normal range of motion and neck supple.      Right lower leg: No edema.      Left lower leg: No edema.   Skin:     General: Skin is warm and dry.   Neurological:      Mental Status: She is alert and oriented to person, place, and time.      Sensory: No sensory deficit.      Motor: No weakness.      Comments: + mild asterixis, oriented but pressured speech   Psychiatric:         Behavior: Behavior normal.       Significant Labs: All pertinent labs within the past 24 hours have been reviewed.    Recent Results (from the past 24 hour(s))   Protime-INR    Collection Time: 03/03/23  4:39 AM   Result Value Ref Range    Prothrombin Time 11.7 9.0 - 12.5 sec    INR 1.1 0.8 - 1.2   CBC Without Differential    Collection Time: 03/03/23  4:39 AM   Result Value Ref Range    WBC 2.71 (L) 3.90 - 12.70 K/uL    RBC 3.02 (L) 4.00 - 5.40 M/uL    Hemoglobin 9.0 (L) 12.0 - 16.0 g/dL    Hematocrit 27.9 (L) 37.0 - 48.5 %    MCV 92 82 - 98 fL     MCH 29.8 27.0 - 31.0 pg    MCHC 32.3 32.0 - 36.0 g/dL    RDW 14.7 (H) 11.5 - 14.5 %    Platelets 57 (L) 150 - 450 K/uL    MPV 12.2 9.2 - 12.9 fL   Comprehensive Metabolic Panel (CMP)    Collection Time: 03/03/23  4:39 AM   Result Value Ref Range    Sodium 131 (L) 136 - 145 mmol/L    Potassium 4.2 3.5 - 5.1 mmol/L    Chloride 100 95 - 110 mmol/L    CO2 24 23 - 29 mmol/L    Glucose 83 70 - 110 mg/dL    BUN 21 (H) 6 - 20 mg/dL    Creatinine 1.4 0.5 - 1.4 mg/dL    Calcium 8.3 (L) 8.7 - 10.5 mg/dL    Total Protein 5.1 (L) 6.0 - 8.4 g/dL    Albumin 2.3 (L) 3.5 - 5.2 g/dL    Total Bilirubin 1.4 (H) 0.1 - 1.0 mg/dL    Alkaline Phosphatase 454 (H) 55 - 135 U/L    AST 56 (H) 10 - 40 U/L    ALT 33 10 - 44 U/L    Anion Gap 7 (L) 8 - 16 mmol/L    eGFR 44.7 (A) >60 mL/min/1.73 m^2   Magnesium    Collection Time: 03/03/23  4:39 AM   Result Value Ref Range    Magnesium 2.0 1.6 - 2.6 mg/dL   Tacrolimus level    Collection Time: 03/03/23  4:39 AM   Result Value Ref Range    Tacrolimus Lvl 6.8 5.0 - 15.0 ng/mL         Significant Imaging: I have reviewed all pertinent imaging results/findings within the past 24 hours.    Imaging Results              US Doppler Liver Transplant Post (xpd) (Final result)  Result time 03/01/23 02:08:28   Procedure changed from US Liver with Doppler (xpd)     Final result by Clark Morales MD (03/01/23 02:08:28)                   Impression:      No evidence of portal venous thrombosis as questioned.  Satisfactory Doppler evaluation of the liver transplant.    Cirrhotic morphology of the hepatic allograft.  Questionable 2.8 cm hypoechoic area at the periphery of the left hepatic lobe which may reflect changes related to nodular contour versus possible lesion.  Consider further evaluation with multiphase CT or MRI if clinically warranted.    Electronically signed by resident: Lazaro Pacheco  Date:    03/01/2023  Time:    01:36    Electronically signed by: Clark Morales  MD  Date:    03/01/2023  Time:    02:08               Narrative:    EXAMINATION:  US DOPPLER LIVER TRANSPLANT POST (XPD)    CLINICAL HISTORY:  R/O portal vein thrombosis; R/O Ascites;    TECHNIQUE:  Limited abdominal ultrasound of the transplant liver with Doppler evaluation.  Color and spectral Doppler were performed.    COMPARISON:  Liver transplant Doppler 01/03/2023, 03/25/2022, CT abdomen pelvis 03/24/2022    FINDINGS:  Patient is status post orthotopic liver transplant in 2002.  Liver allograft is demonstrates a nodular contour with heterogeneous echotexture suggestive of cirrhosis.  2.3 x 2.5 x 2.8 cm questionable slightly hypoattenuating area in the left hepatic lobe demonstrating vascularity, which may reflect nodular changes related to cirrhosis over a left hepatic lobe lesion.  No fluid collections or significant upper abdominal ascites.    The common duct is not significantly distended for post cholecystectomy status, maximally measuring 8 mm and similar to prior noting it measured up to 7 mm on 03/25/2022.  No dilated intrahepatic radicles are seen.    Color flow and spectral waveform analysis was performed.  The main portal vein, right portal vein, left portal vein, middle hepatic vein, right hepatic vein, left hepatic vein, and IVC are patent with proper directional flow.  Peak velocity of the main portal vein is 30 cm/sec.    Anastomosis site of the main hepatic artery demonstrates a peak systolic velocity 131 cm/sec.  (Previously 137)    Main hepatic artery demonstrates resistive index 0.77 with normal waveform.  (Previously 0.79)    Left hepatic artery shows resistive index 0.75 with normal waveform.  (Previously 0.81)    Anterior branch of the right hepatic artery demonstrates resistive index 0.75 with normal waveform.  (Previously 0.81)    Posterior branch of the right hepatic artery demonstrates resistive index 0.72 with normal waveform.  (Previously 0.79)                                        CT Soft Tissue Neck With Contrast (Final result)  Result time 02/28/23 23:47:24      Final result by Clark Morales MD (02/28/23 23:47:24)                   Impression:      No drainable abscess identified in the neck soft tissues.  Further evaluation/follow-up including direct visualization as clinically warranted.    Patulous appearance of the esophagus with fluid and air in the esophageal lumen.    Additional findings discussed in the body of the report.      Electronically signed by: Clark Morales MD  Date:    02/28/2023  Time:    23:47               Narrative:    EXAMINATION:  CT SOFT TISSUE NECK WITH CONTRAST    CLINICAL HISTORY:  Neck mass, nonpulsatile;Neck abscess, deep tissue; No additional clinical history or physical exam provided in the chart at the time of dictation.    TECHNIQUE:  CT images obtained throughout the region of the neck after the administration of 75 mL Omnipaque 350 intravenous contrast. Axial, sagittal and coronal reconstructions were performed.    COMPARISON:  CT chest, 09/04/2020.    FINDINGS:  Exam quality is limited by suboptimal positioning and motion artifact.    No abnormal fluid collection identified in the neck soft tissues.  No convincing enhancing mass identified in the neck soft tissues.  Recommend correlation with physical exam and/or direct visualization as clinically warranted.    Epiglottis is unremarkable.  The parotid and submandibular glands are symmetric without significant adjacent fat stranding.  Subcentimeter hypodensity in the left lobe of the thyroid gland, too small for dedicated follow-up.    Major vessels of the neck appear patent.  Atherosclerosis and narrowing of the bilateral carotid arteries.  Limited intracranial evaluation appears negative for acute finding.  The visualized paranasal sinuses are essentially clear.  Postoperative changes in the right mastoid air cells.  Left mastoid air cells are essentially clear.  Postoperative changes of  suboccipital craniectomy.    There is a questionable partially visualized nodule versus atelectasis or scarring in the right upper lobe (axial series 3, image 394).  This finding is likely similar when compared with prior CT dated 09/04/2020.  Lung apices are otherwise unremarkable.    Patulous appearance of the visualized esophagus with air and fluid in the mid esophageal lumen.    No aggressive osseous lesion identified.  Minimal vertebral body height loss at T3, likely chronic.  Partially visualized postoperative changes of prior median sternotomy.                                       X-Ray Chest AP Portable (Final result)  Result time 02/28/23 19:36:17      Final result by Samuel Grant MD (02/28/23 19:36:17)                   Impression:      1. No acute cardiopulmonary process.      Electronically signed by: Samuel Grant MD  Date:    02/28/2023  Time:    19:36               Narrative:    EXAMINATION:  XR CHEST AP PORTABLE    CLINICAL HISTORY:  ams;    TECHNIQUE:  Single frontal view of the chest was performed.    COMPARISON:  03/24/2022    FINDINGS:  The cardiomediastinal silhouette is not enlarged, magnified by technique.  There is no pleural effusion.  The trachea is midline.  The lungs are symmetrically expanded bilaterally without evidence of acute parenchymal process. No large focal consolidation seen.  There is no pneumothorax.  The osseous structures are remarkable for degenerative changes.  Patient is rotated..                                       CT Head Without Contrast (Final result)  Result time 02/28/23 18:54:58      Final result by González Kwok MD (02/28/23 18:54:58)                   Impression:      No evidence of acute intracranial pathology.  Additional evaluation, as clinically warranted.    Stable multifocal parenchymal calcifications, nonspecific and potentially related to remote infectious or metabolic etiology.    Electronically signed by resident: Jakob  Stubenrauch  Date:    02/28/2023  Time:    18:36    Electronically signed by: González Kwok MD  Date:    02/28/2023  Time:    18:54               Narrative:    EXAMINATION:  CT HEAD WITHOUT CONTRAST    CLINICAL HISTORY:  Headache, chronic, new features or increased frequency;    TECHNIQUE:  Low dose axial CT images obtained throughout the head without the use of intravenous contrast.  Axial, sagittal and coronal reconstructions were performed.    COMPARISON:  CT 09/25/2020, MRI 04/24/2015    FINDINGS:  Intracranial compartment:    The ventricular configuration appears stable from prior exam without evidence of hydrocephalus.    Brain parenchyma appears unchanged with multifocal parenchymal calcifications stable in distribution compared to the prior exam.  No parenchymal mass, hemorrhage, edema or major vascular distribution infarct. No extra-axial blood or fluid collections.    Skull/extracranial contents (limited evaluation):    Remote postoperative changes of suboccipital craniectomy.  Prior right mastoidectomy.  Left mastoid air cells are clear.  Visualized paranasal sinuses are clear.                                          Assessment/Plan:      * Decompensated liver disease  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  Encephalopathy    Patient presenting with confusion and disorientation with notable flapping tremor concerning for hepatic encephalopathy. 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 Patient also with abdominal distention and generalized abdominal pain concerning for underlying ascites. She denies any recent GI bleeding. Follows with Liver Transplant clinic as an outpatient.    - Hepatology consulted, appreciate recommendations  -During IR ultrasound evaluation, no ascites identified for safe paracentesis  -U/S liver w/ Questionable 2.8 cm hypoechoic area at the periphery of the left hepatic lobe; CT triple phase to further evaluate  inconclusive; discussed with hepatology, MRI abdomen pending  - lactulose 30 g TID  - rifaximin 500 mg BID    MELD-Na score: 18 at 3/3/2023  4:39 AM  MELD score: 12 at 3/3/2023  4:39 AM  Calculated from:  Serum Creatinine: 1.4 mg/dL at 3/3/2023  4:39 AM  Serum Sodium: 131 mmol/L at 3/3/2023  4:39 AM  Total Bilirubin: 1.4 mg/dL at 3/3/2023  4:39 AM  INR(ratio): 1.1 at 3/3/2023  4:39 AM  Age: 52 years      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  Tacrolimus 0.5 mg BID. Trend tacrolimus levels daily.  Appreciate hepatology input.      Ataxia  Per neurology, MRI with objective findings of cerebellar infarcts likely accounting for physical presentation.   Physical exam findings consistent with and correlates to old cerebellar infarct. Pt would benefit from outpatient physical/vestibular therapy.      Depression  Patient has persistent depression which is unknown and is currently controlled. Will Continue anti-depressant medications. We will not consult psychiatry at this time. Patient does not display psychosis at this time. Continue to monitor closely and adjust plan of care as needed. venlafaxine 150 mg daily.        Hypothyroidism  Resume home levothyroxine.      Anxiety  Home medication includes Klonopin 0.5 mg b.i.d. p.r.n.  Potentially contributing to encephalopathy, will discuss with patient regarding reduction and eventual cessation      Depression, recurrent  Resume home           SIADH (syndrome of inappropriate ADH production)  Patient used to take demeclocycline at home. Patient's sodium level within normal limits upon admission. Will continue to trend.  Na 131 today        VTE Risk Mitigation (From admission, onward)         Ordered     Place sequential compression device  Until discontinued         03/01/23 0151     IP VTE LOW RISK PATIENT  Once         02/28/23 1958                Discharge Planning   MARILEE: 3/4/2023     Code Status: Full Code   Is the patient medically ready for discharge?: No     Reason for patient still in hospital (select all that apply): Imaging, Consult recommendations and Pending disposition  Discharge Plan A: Home with family                  Lizeth Worley MD  Department of Hospital Medicine   [facility] - Telemetry Stepdown

## 2023-03-03 NOTE — ASSESSMENT & PLAN NOTE
Per neurology, MRI with objective findings of cerebellar infarcts likely accounting for physical presentation.   Physical exam findings consistent with and correlates to old cerebellar infarct. Pt would benefit from outpatient physical/vestibular therapy.

## 2023-03-03 NOTE — SUBJECTIVE & OBJECTIVE
Interval History:   Patient seen and examined at bedside.    No acute events overnight.  She is oriented to person, place, time.  Still notes occasional memory deficits. Discussed amb referral to Neuropsych.  Intermittently requiring pain medicine for HA.   Patient updated regarding care plan. Awaiting MRI.      Review of Systems   Constitutional:  Negative for chills, fatigue and fever.   HENT:  Negative for congestion, postnasal drip, rhinorrhea, sneezing and sore throat.    Eyes:  Negative for photophobia and visual disturbance.   Respiratory:  Negative for cough, choking, chest tightness and shortness of breath.    Cardiovascular:  Negative for chest pain, palpitations and leg swelling.   Gastrointestinal:  Negative for abdominal distention, abdominal pain, constipation, diarrhea, nausea and vomiting.   Endocrine: Negative for polyphagia and polyuria.   Genitourinary:  Negative for decreased urine volume, difficulty urinating, dysuria and urgency.   Musculoskeletal:  Positive for gait problem. Negative for arthralgias and myalgias.   Skin:  Negative for rash and wound.   Neurological:  Positive for dizziness, tremors, light-headedness and headaches. Negative for seizures and syncope.   Psychiatric/Behavioral:  Negative for agitation and decreased concentration.    Objective:     Vital Signs (Most Recent):  Temp: 98.5 °F (36.9 °C) (03/03/23 1123)  Pulse: 65 (03/03/23 1123)  Resp: 18 (03/03/23 1123)  BP: (!) 145/70 (03/03/23 1123)  SpO2: 100 % (03/03/23 1123)   Vital Signs (24h Range):  Temp:  [96.1 °F (35.6 °C)-98.5 °F (36.9 °C)] 98.5 °F (36.9 °C)  Pulse:  [62-71] 65  Resp:  [16-20] 18  SpO2:  [98 %-100 %] 100 %  BP: (110-149)/(55-70) 145/70     Weight: 52.6 kg (116 lb)  Body mass index is 23.43 kg/m².  No intake or output data in the 24 hours ending 03/03/23 1219   Physical Exam  Vitals and nursing note reviewed.   Constitutional:       General: She is not in acute distress.     Appearance: She is ill-appearing  (chronically). She is not toxic-appearing or diaphoretic.   HENT:      Head: Normocephalic and atraumatic.      Right Ear: External ear normal.      Left Ear: External ear normal.      Mouth/Throat:      Mouth: Mucous membranes are moist.   Eyes:      General: No scleral icterus.        Right eye: No discharge.         Left eye: No discharge.   Cardiovascular:      Rate and Rhythm: Normal rate and regular rhythm.      Pulses: Normal pulses.      Heart sounds: Normal heart sounds. No murmur heard.    No friction rub. No gallop.   Pulmonary:      Effort: Pulmonary effort is normal. No respiratory distress.      Breath sounds: No stridor. No wheezing, rhonchi or rales.   Chest:      Chest wall: No tenderness.   Abdominal:      General: Abdomen is flat. Bowel sounds are normal. There is no distension.      Palpations: There is no mass.      Tenderness: There is no abdominal tenderness. There is no guarding or rebound.      Hernia: No hernia is present.   Musculoskeletal:      Cervical back: Normal range of motion and neck supple.      Right lower leg: No edema.      Left lower leg: No edema.   Skin:     General: Skin is warm and dry.   Neurological:      Mental Status: She is alert and oriented to person, place, and time.      Sensory: No sensory deficit.      Motor: No weakness.      Comments: + mild asterixis, oriented but pressured speech   Psychiatric:         Behavior: Behavior normal.       Significant Labs: All pertinent labs within the past 24 hours have been reviewed.    Recent Results (from the past 24 hour(s))   Protime-INR    Collection Time: 03/03/23  4:39 AM   Result Value Ref Range    Prothrombin Time 11.7 9.0 - 12.5 sec    INR 1.1 0.8 - 1.2   CBC Without Differential    Collection Time: 03/03/23  4:39 AM   Result Value Ref Range    WBC 2.71 (L) 3.90 - 12.70 K/uL    RBC 3.02 (L) 4.00 - 5.40 M/uL    Hemoglobin 9.0 (L) 12.0 - 16.0 g/dL    Hematocrit 27.9 (L) 37.0 - 48.5 %    MCV 92 82 - 98 fL    MCH 29.8  27.0 - 31.0 pg    MCHC 32.3 32.0 - 36.0 g/dL    RDW 14.7 (H) 11.5 - 14.5 %    Platelets 57 (L) 150 - 450 K/uL    MPV 12.2 9.2 - 12.9 fL   Comprehensive Metabolic Panel (CMP)    Collection Time: 03/03/23  4:39 AM   Result Value Ref Range    Sodium 131 (L) 136 - 145 mmol/L    Potassium 4.2 3.5 - 5.1 mmol/L    Chloride 100 95 - 110 mmol/L    CO2 24 23 - 29 mmol/L    Glucose 83 70 - 110 mg/dL    BUN 21 (H) 6 - 20 mg/dL    Creatinine 1.4 0.5 - 1.4 mg/dL    Calcium 8.3 (L) 8.7 - 10.5 mg/dL    Total Protein 5.1 (L) 6.0 - 8.4 g/dL    Albumin 2.3 (L) 3.5 - 5.2 g/dL    Total Bilirubin 1.4 (H) 0.1 - 1.0 mg/dL    Alkaline Phosphatase 454 (H) 55 - 135 U/L    AST 56 (H) 10 - 40 U/L    ALT 33 10 - 44 U/L    Anion Gap 7 (L) 8 - 16 mmol/L    eGFR 44.7 (A) >60 mL/min/1.73 m^2   Magnesium    Collection Time: 03/03/23  4:39 AM   Result Value Ref Range    Magnesium 2.0 1.6 - 2.6 mg/dL   Tacrolimus level    Collection Time: 03/03/23  4:39 AM   Result Value Ref Range    Tacrolimus Lvl 6.8 5.0 - 15.0 ng/mL         Significant Imaging: I have reviewed all pertinent imaging results/findings within the past 24 hours.    Imaging Results              US Doppler Liver Transplant Post (xpd) (Final result)  Result time 03/01/23 02:08:28   Procedure changed from US Liver with Doppler (xpd)     Final result by Clark Morales MD (03/01/23 02:08:28)                   Impression:      No evidence of portal venous thrombosis as questioned.  Satisfactory Doppler evaluation of the liver transplant.    Cirrhotic morphology of the hepatic allograft.  Questionable 2.8 cm hypoechoic area at the periphery of the left hepatic lobe which may reflect changes related to nodular contour versus possible lesion.  Consider further evaluation with multiphase CT or MRI if clinically warranted.    Electronically signed by resident: Lazaro Pacheco  Date:    03/01/2023  Time:    01:36    Electronically signed by: Clark Morales  MD  Date:    03/01/2023  Time:    02:08               Narrative:    EXAMINATION:  US DOPPLER LIVER TRANSPLANT POST (XPD)    CLINICAL HISTORY:  R/O portal vein thrombosis; R/O Ascites;    TECHNIQUE:  Limited abdominal ultrasound of the transplant liver with Doppler evaluation.  Color and spectral Doppler were performed.    COMPARISON:  Liver transplant Doppler 01/03/2023, 03/25/2022, CT abdomen pelvis 03/24/2022    FINDINGS:  Patient is status post orthotopic liver transplant in 2002.  Liver allograft is demonstrates a nodular contour with heterogeneous echotexture suggestive of cirrhosis.  2.3 x 2.5 x 2.8 cm questionable slightly hypoattenuating area in the left hepatic lobe demonstrating vascularity, which may reflect nodular changes related to cirrhosis over a left hepatic lobe lesion.  No fluid collections or significant upper abdominal ascites.    The common duct is not significantly distended for post cholecystectomy status, maximally measuring 8 mm and similar to prior noting it measured up to 7 mm on 03/25/2022.  No dilated intrahepatic radicles are seen.    Color flow and spectral waveform analysis was performed.  The main portal vein, right portal vein, left portal vein, middle hepatic vein, right hepatic vein, left hepatic vein, and IVC are patent with proper directional flow.  Peak velocity of the main portal vein is 30 cm/sec.    Anastomosis site of the main hepatic artery demonstrates a peak systolic velocity 131 cm/sec.  (Previously 137)    Main hepatic artery demonstrates resistive index 0.77 with normal waveform.  (Previously 0.79)    Left hepatic artery shows resistive index 0.75 with normal waveform.  (Previously 0.81)    Anterior branch of the right hepatic artery demonstrates resistive index 0.75 with normal waveform.  (Previously 0.81)    Posterior branch of the right hepatic artery demonstrates resistive index 0.72 with normal waveform.  (Previously 0.79)                                        CT Soft Tissue Neck With Contrast (Final result)  Result time 02/28/23 23:47:24      Final result by Clark Morales MD (02/28/23 23:47:24)                   Impression:      No drainable abscess identified in the neck soft tissues.  Further evaluation/follow-up including direct visualization as clinically warranted.    Patulous appearance of the esophagus with fluid and air in the esophageal lumen.    Additional findings discussed in the body of the report.      Electronically signed by: Clark Morales MD  Date:    02/28/2023  Time:    23:47               Narrative:    EXAMINATION:  CT SOFT TISSUE NECK WITH CONTRAST    CLINICAL HISTORY:  Neck mass, nonpulsatile;Neck abscess, deep tissue; No additional clinical history or physical exam provided in the chart at the time of dictation.    TECHNIQUE:  CT images obtained throughout the region of the neck after the administration of 75 mL Omnipaque 350 intravenous contrast. Axial, sagittal and coronal reconstructions were performed.    COMPARISON:  CT chest, 09/04/2020.    FINDINGS:  Exam quality is limited by suboptimal positioning and motion artifact.    No abnormal fluid collection identified in the neck soft tissues.  No convincing enhancing mass identified in the neck soft tissues.  Recommend correlation with physical exam and/or direct visualization as clinically warranted.    Epiglottis is unremarkable.  The parotid and submandibular glands are symmetric without significant adjacent fat stranding.  Subcentimeter hypodensity in the left lobe of the thyroid gland, too small for dedicated follow-up.    Major vessels of the neck appear patent.  Atherosclerosis and narrowing of the bilateral carotid arteries.  Limited intracranial evaluation appears negative for acute finding.  The visualized paranasal sinuses are essentially clear.  Postoperative changes in the right mastoid air cells.  Left mastoid air cells are essentially clear.  Postoperative changes of  suboccipital craniectomy.    There is a questionable partially visualized nodule versus atelectasis or scarring in the right upper lobe (axial series 3, image 394).  This finding is likely similar when compared with prior CT dated 09/04/2020.  Lung apices are otherwise unremarkable.    Patulous appearance of the visualized esophagus with air and fluid in the mid esophageal lumen.    No aggressive osseous lesion identified.  Minimal vertebral body height loss at T3, likely chronic.  Partially visualized postoperative changes of prior median sternotomy.                                       X-Ray Chest AP Portable (Final result)  Result time 02/28/23 19:36:17      Final result by Samuel Grant MD (02/28/23 19:36:17)                   Impression:      1. No acute cardiopulmonary process.      Electronically signed by: Samuel Grant MD  Date:    02/28/2023  Time:    19:36               Narrative:    EXAMINATION:  XR CHEST AP PORTABLE    CLINICAL HISTORY:  ams;    TECHNIQUE:  Single frontal view of the chest was performed.    COMPARISON:  03/24/2022    FINDINGS:  The cardiomediastinal silhouette is not enlarged, magnified by technique.  There is no pleural effusion.  The trachea is midline.  The lungs are symmetrically expanded bilaterally without evidence of acute parenchymal process. No large focal consolidation seen.  There is no pneumothorax.  The osseous structures are remarkable for degenerative changes.  Patient is rotated..                                       CT Head Without Contrast (Final result)  Result time 02/28/23 18:54:58      Final result by González Kwok MD (02/28/23 18:54:58)                   Impression:      No evidence of acute intracranial pathology.  Additional evaluation, as clinically warranted.    Stable multifocal parenchymal calcifications, nonspecific and potentially related to remote infectious or metabolic etiology.    Electronically signed by resident: Jakob  Stubenrauch  Date:    02/28/2023  Time:    18:36    Electronically signed by: González Kwok MD  Date:    02/28/2023  Time:    18:54               Narrative:    EXAMINATION:  CT HEAD WITHOUT CONTRAST    CLINICAL HISTORY:  Headache, chronic, new features or increased frequency;    TECHNIQUE:  Low dose axial CT images obtained throughout the head without the use of intravenous contrast.  Axial, sagittal and coronal reconstructions were performed.    COMPARISON:  CT 09/25/2020, MRI 04/24/2015    FINDINGS:  Intracranial compartment:    The ventricular configuration appears stable from prior exam without evidence of hydrocephalus.    Brain parenchyma appears unchanged with multifocal parenchymal calcifications stable in distribution compared to the prior exam.  No parenchymal mass, hemorrhage, edema or major vascular distribution infarct. No extra-axial blood or fluid collections.    Skull/extracranial contents (limited evaluation):    Remote postoperative changes of suboccipital craniectomy.  Prior right mastoidectomy.  Left mastoid air cells are clear.  Visualized paranasal sinuses are clear.

## 2023-03-03 NOTE — ASSESSMENT & PLAN NOTE
Patient used to take demeclocycline at home. Patient's sodium level within normal limits upon admission. Will continue to trend.  Na 131 today

## 2023-03-03 NOTE — PLAN OF CARE
Problem: Adult Inpatient Plan of Care  Goal: Plan of Care Review  Outcome: Ongoing, Progressing  Goal: Absence of Hospital-Acquired Illness or Injury  Outcome: Ongoing, Progressing  Goal: Optimal Comfort and Wellbeing  Outcome: Ongoing, Progressing  Goal: Readiness for Transition of Care  Outcome: Ongoing, Progressing     Problem: Infection  Goal: Absence of Infection Signs and Symptoms  Outcome: Ongoing, Progressing     Problem: Fall Injury Risk  Goal: Absence of Fall and Fall-Related Injury  Outcome: Ongoing, Progressing    PT alert and oriented x 4. Able to voice all wants and needs. All needs met.  She has remained free of falls and injuries. Safety eduction and plan of care reviewed with PT and he voiced understanding. Bed is low and locked with call light with in easy reach.  Bed a;arm set.

## 2023-03-04 PROBLEM — E87.1 HYPONATREMIA: Status: ACTIVE | Noted: 2023-03-04

## 2023-03-04 LAB
ALBUMIN SERPL BCP-MCNC: 2.4 G/DL (ref 3.5–5.2)
ALP SERPL-CCNC: 516 U/L (ref 55–135)
ALT SERPL W/O P-5'-P-CCNC: 37 U/L (ref 10–44)
AMMONIA PLAS-SCNC: 87 UMOL/L (ref 10–50)
ANION GAP SERPL CALC-SCNC: 8 MMOL/L (ref 8–16)
ANION GAP SERPL CALC-SCNC: 8 MMOL/L (ref 8–16)
AST SERPL-CCNC: 60 U/L (ref 10–40)
BASOPHILS # BLD AUTO: 0.03 K/UL (ref 0–0.2)
BASOPHILS NFR BLD: 1 % (ref 0–1.9)
BILIRUB SERPL-MCNC: 1.5 MG/DL (ref 0.1–1)
BUN SERPL-MCNC: 22 MG/DL (ref 6–20)
BUN SERPL-MCNC: 22 MG/DL (ref 6–20)
CALCIUM SERPL-MCNC: 8.6 MG/DL (ref 8.7–10.5)
CALCIUM SERPL-MCNC: 8.6 MG/DL (ref 8.7–10.5)
CHLORIDE SERPL-SCNC: 96 MMOL/L (ref 95–110)
CHLORIDE SERPL-SCNC: 98 MMOL/L (ref 95–110)
CO2 SERPL-SCNC: 24 MMOL/L (ref 23–29)
CO2 SERPL-SCNC: 26 MMOL/L (ref 23–29)
CREAT SERPL-MCNC: 1.5 MG/DL (ref 0.5–1.4)
CREAT SERPL-MCNC: 1.5 MG/DL (ref 0.5–1.4)
DIFFERENTIAL METHOD: ABNORMAL
EOSINOPHIL # BLD AUTO: 0.2 K/UL (ref 0–0.5)
EOSINOPHIL NFR BLD: 8 % (ref 0–8)
ERYTHROCYTE [DISTWIDTH] IN BLOOD BY AUTOMATED COUNT: 14.5 % (ref 11.5–14.5)
EST. GFR  (NO RACE VARIABLE): 41.2 ML/MIN/1.73 M^2
EST. GFR  (NO RACE VARIABLE): 41.2 ML/MIN/1.73 M^2
GLUCOSE SERPL-MCNC: 56 MG/DL (ref 70–110)
GLUCOSE SERPL-MCNC: 82 MG/DL (ref 70–110)
HCT VFR BLD AUTO: 29.4 % (ref 37–48.5)
HGB BLD-MCNC: 10 G/DL (ref 12–16)
IMM GRANULOCYTES # BLD AUTO: 0.01 K/UL (ref 0–0.04)
IMM GRANULOCYTES NFR BLD AUTO: 0.3 % (ref 0–0.5)
INR PPP: 1.1 (ref 0.8–1.2)
LYMPHOCYTES # BLD AUTO: 0.6 K/UL (ref 1–4.8)
LYMPHOCYTES NFR BLD: 18.4 % (ref 18–48)
MAGNESIUM SERPL-MCNC: 1.9 MG/DL (ref 1.6–2.6)
MCH RBC QN AUTO: 30.7 PG (ref 27–31)
MCHC RBC AUTO-ENTMCNC: 34 G/DL (ref 32–36)
MCV RBC AUTO: 90 FL (ref 82–98)
MONOCYTES # BLD AUTO: 0.5 K/UL (ref 0.3–1)
MONOCYTES NFR BLD: 16.7 % (ref 4–15)
NEUTROPHILS # BLD AUTO: 1.7 K/UL (ref 1.8–7.7)
NEUTROPHILS NFR BLD: 55.6 % (ref 38–73)
NRBC BLD-RTO: 0 /100 WBC
OSMOLALITY SERPL: 281 MOSM/KG (ref 275–295)
OSMOLALITY UR: 237 MOSM/KG (ref 50–1200)
PLATELET # BLD AUTO: 67 K/UL (ref 150–450)
PMV BLD AUTO: 12.2 FL (ref 9.2–12.9)
POTASSIUM SERPL-SCNC: 3.6 MMOL/L (ref 3.5–5.1)
POTASSIUM SERPL-SCNC: 3.6 MMOL/L (ref 3.5–5.1)
PROT SERPL-MCNC: 5.3 G/DL (ref 6–8.4)
PROTHROMBIN TIME: 11.4 SEC (ref 9–12.5)
RBC # BLD AUTO: 3.26 M/UL (ref 4–5.4)
SODIUM SERPL-SCNC: 130 MMOL/L (ref 136–145)
SODIUM SERPL-SCNC: 130 MMOL/L (ref 136–145)
SODIUM UR-SCNC: <10 MMOL/L (ref 20–250)
TACROLIMUS BLD-MCNC: 6.3 NG/ML (ref 5–15)
WBC # BLD AUTO: 2.99 K/UL (ref 3.9–12.7)

## 2023-03-04 PROCEDURE — 25000003 PHARM REV CODE 250: Performed by: HOSPITALIST

## 2023-03-04 PROCEDURE — 83735 ASSAY OF MAGNESIUM: CPT | Performed by: STUDENT IN AN ORGANIZED HEALTH CARE EDUCATION/TRAINING PROGRAM

## 2023-03-04 PROCEDURE — 36415 COLL VENOUS BLD VENIPUNCTURE: CPT | Performed by: STUDENT IN AN ORGANIZED HEALTH CARE EDUCATION/TRAINING PROGRAM

## 2023-03-04 PROCEDURE — 83930 ASSAY OF BLOOD OSMOLALITY: CPT | Performed by: STUDENT IN AN ORGANIZED HEALTH CARE EDUCATION/TRAINING PROGRAM

## 2023-03-04 PROCEDURE — 94761 N-INVAS EAR/PLS OXIMETRY MLT: CPT

## 2023-03-04 PROCEDURE — 25000003 PHARM REV CODE 250: Performed by: STUDENT IN AN ORGANIZED HEALTH CARE EDUCATION/TRAINING PROGRAM

## 2023-03-04 PROCEDURE — 63600175 PHARM REV CODE 636 W HCPCS: Performed by: STUDENT IN AN ORGANIZED HEALTH CARE EDUCATION/TRAINING PROGRAM

## 2023-03-04 PROCEDURE — 80048 BASIC METABOLIC PNL TOTAL CA: CPT | Mod: XB | Performed by: STUDENT IN AN ORGANIZED HEALTH CARE EDUCATION/TRAINING PROGRAM

## 2023-03-04 PROCEDURE — 85025 COMPLETE CBC W/AUTO DIFF WBC: CPT | Performed by: STUDENT IN AN ORGANIZED HEALTH CARE EDUCATION/TRAINING PROGRAM

## 2023-03-04 PROCEDURE — 80053 COMPREHEN METABOLIC PANEL: CPT | Performed by: STUDENT IN AN ORGANIZED HEALTH CARE EDUCATION/TRAINING PROGRAM

## 2023-03-04 PROCEDURE — 99233 SBSQ HOSP IP/OBS HIGH 50: CPT | Mod: ,,, | Performed by: STUDENT IN AN ORGANIZED HEALTH CARE EDUCATION/TRAINING PROGRAM

## 2023-03-04 PROCEDURE — 82140 ASSAY OF AMMONIA: CPT | Performed by: STUDENT IN AN ORGANIZED HEALTH CARE EDUCATION/TRAINING PROGRAM

## 2023-03-04 PROCEDURE — 20600001 HC STEP DOWN PRIVATE ROOM

## 2023-03-04 PROCEDURE — 85610 PROTHROMBIN TIME: CPT | Performed by: STUDENT IN AN ORGANIZED HEALTH CARE EDUCATION/TRAINING PROGRAM

## 2023-03-04 PROCEDURE — 83935 ASSAY OF URINE OSMOLALITY: CPT | Performed by: STUDENT IN AN ORGANIZED HEALTH CARE EDUCATION/TRAINING PROGRAM

## 2023-03-04 PROCEDURE — 99233 PR SUBSEQUENT HOSPITAL CARE,LEVL III: ICD-10-PCS | Mod: ,,, | Performed by: STUDENT IN AN ORGANIZED HEALTH CARE EDUCATION/TRAINING PROGRAM

## 2023-03-04 PROCEDURE — 84300 ASSAY OF URINE SODIUM: CPT | Performed by: STUDENT IN AN ORGANIZED HEALTH CARE EDUCATION/TRAINING PROGRAM

## 2023-03-04 PROCEDURE — 80197 ASSAY OF TACROLIMUS: CPT | Performed by: STUDENT IN AN ORGANIZED HEALTH CARE EDUCATION/TRAINING PROGRAM

## 2023-03-04 RX ORDER — SODIUM CHLORIDE 9 MG/ML
INJECTION, SOLUTION INTRAVENOUS CONTINUOUS
Status: ACTIVE | OUTPATIENT
Start: 2023-03-04 | End: 2023-03-04

## 2023-03-04 RX ORDER — SODIUM CHLORIDE 9 MG/ML
INJECTION, SOLUTION INTRAVENOUS CONTINUOUS
Status: ACTIVE | OUTPATIENT
Start: 2023-03-04 | End: 2023-03-05

## 2023-03-04 RX ORDER — HYDROMORPHONE HYDROCHLORIDE 1 MG/ML
0.5 INJECTION, SOLUTION INTRAMUSCULAR; INTRAVENOUS; SUBCUTANEOUS ONCE
Status: COMPLETED | OUTPATIENT
Start: 2023-03-04 | End: 2023-03-04

## 2023-03-04 RX ADMIN — VENLAFAXINE HYDROCHLORIDE 150 MG: 150 CAPSULE, EXTENDED RELEASE ORAL at 08:03

## 2023-03-04 RX ADMIN — ONDANSETRON 4 MG: 2 INJECTION INTRAMUSCULAR; INTRAVENOUS at 05:03

## 2023-03-04 RX ADMIN — TACROLIMUS 0.5 MG: 0.5 CAPSULE ORAL at 08:03

## 2023-03-04 RX ADMIN — LACTULOSE 30 G: 20 SOLUTION ORAL at 02:03

## 2023-03-04 RX ADMIN — OXYCODONE HYDROCHLORIDE 10 MG: 10 TABLET ORAL at 11:03

## 2023-03-04 RX ADMIN — RIFAXIMIN 550 MG: 550 TABLET ORAL at 08:03

## 2023-03-04 RX ADMIN — OXYCODONE HYDROCHLORIDE 10 MG: 10 TABLET ORAL at 09:03

## 2023-03-04 RX ADMIN — ONDANSETRON 4 MG: 2 INJECTION INTRAMUSCULAR; INTRAVENOUS at 01:03

## 2023-03-04 RX ADMIN — PROMETHAZINE HYDROCHLORIDE 25 MG: 25 TABLET ORAL at 05:03

## 2023-03-04 RX ADMIN — PANTOPRAZOLE SODIUM 40 MG: 40 TABLET, DELAYED RELEASE ORAL at 08:03

## 2023-03-04 RX ADMIN — HYDROMORPHONE HYDROCHLORIDE 0.5 MG: 1 INJECTION, SOLUTION INTRAMUSCULAR; INTRAVENOUS; SUBCUTANEOUS at 05:03

## 2023-03-04 RX ADMIN — PRAVASTATIN SODIUM 10 MG: 10 TABLET ORAL at 08:03

## 2023-03-04 RX ADMIN — PROMETHAZINE HYDROCHLORIDE 25 MG: 25 TABLET ORAL at 11:03

## 2023-03-04 RX ADMIN — HYDROMORPHONE HYDROCHLORIDE 0.5 MG: 1 INJECTION, SOLUTION INTRAMUSCULAR; INTRAVENOUS; SUBCUTANEOUS at 02:03

## 2023-03-04 RX ADMIN — SODIUM CHLORIDE: 9 INJECTION, SOLUTION INTRAVENOUS at 05:03

## 2023-03-04 RX ADMIN — HYDROMORPHONE HYDROCHLORIDE 0.5 MG: 1 INJECTION, SOLUTION INTRAMUSCULAR; INTRAVENOUS; SUBCUTANEOUS at 01:03

## 2023-03-04 RX ADMIN — CLONAZEPAM 0.5 MG: 0.5 TABLET ORAL at 08:03

## 2023-03-04 RX ADMIN — BUTALBITAL, ACETAMINOPHEN, AND CAFFEINE 1 TABLET: 325; 50; 40 TABLET ORAL at 11:03

## 2023-03-04 RX ADMIN — LEVOTHYROXINE SODIUM 75 MCG: 75 TABLET ORAL at 05:03

## 2023-03-04 RX ADMIN — Medication 6 MG: at 08:03

## 2023-03-04 RX ADMIN — TACROLIMUS 0.5 MG: 0.5 CAPSULE ORAL at 05:03

## 2023-03-04 RX ADMIN — SODIUM CHLORIDE: 9 INJECTION, SOLUTION INTRAVENOUS at 08:03

## 2023-03-04 RX ADMIN — LACTULOSE 30 G: 20 SOLUTION ORAL at 08:03

## 2023-03-04 RX ADMIN — OXYCODONE HYDROCHLORIDE 10 MG: 10 TABLET ORAL at 05:03

## 2023-03-04 NOTE — ASSESSMENT & PLAN NOTE
Patient with acute kidney injury likely due to IVVD/dehydration JACQUE is currently worsening. Labs reviewed- Renal function/electrolytes with Estimated Creatinine Clearance: 33 mL/min (A) (based on SCr of 1.5 mg/dL (H)). according to latest data. Monitor urine output and serial BMP and adjust therapy as needed. Avoid nephrotoxins and renally dose meds for GFR listed above.   Recent contrast with studies in addition to poor p.o. intake and volume loss with lactulose  IV fluids, repeat BMP in afternoon

## 2023-03-04 NOTE — SUBJECTIVE & OBJECTIVE
Interval History:   Patient seen and examined at bedside.  Awake and alert.  Children at bedside.    Notes headache, nausea, vomiting overnight.  None this morning.  Not really eating the hospital food, however drinking smoothie evelin.  She is oriented to person, place, time.  Less pressured speech today.  She is concerned that lactulose and bowel movements may be contributing to her dehydration.  Patient updated regarding care plan.    Review of Systems   Constitutional:  Negative for chills, fatigue and fever.   HENT:  Negative for congestion, postnasal drip, rhinorrhea, sneezing and sore throat.    Eyes:  Negative for photophobia and visual disturbance.   Respiratory:  Negative for cough, choking, chest tightness and shortness of breath.    Cardiovascular:  Negative for chest pain, palpitations and leg swelling.   Gastrointestinal:  Negative for abdominal distention, abdominal pain, constipation, diarrhea, nausea and vomiting.   Endocrine: Negative for polyphagia and polyuria.   Genitourinary:  Negative for decreased urine volume, difficulty urinating, dysuria and urgency.   Musculoskeletal:  Positive for gait problem. Negative for arthralgias and myalgias.   Skin:  Negative for rash and wound.   Neurological:  Positive for dizziness, tremors, light-headedness and headaches. Negative for seizures and syncope.   Psychiatric/Behavioral:  Negative for agitation and decreased concentration.    Objective:     Vital Signs (Most Recent):  Temp: 98.7 °F (37.1 °C) (03/04/23 1129)  Pulse: 64 (03/04/23 1129)  Resp: 18 (03/04/23 1129)  BP: (!) 109/55 (03/04/23 1129)  SpO2: (!) 94 % (03/04/23 1129)   Vital Signs (24h Range):  Temp:  [97.8 °F (36.6 °C)-98.8 °F (37.1 °C)] 98.7 °F (37.1 °C)  Pulse:  [64-70] 64  Resp:  [12-18] 18  SpO2:  [94 %-100 %] 94 %  BP: (107-149)/(55-69) 109/55     Weight: 52.6 kg (116 lb)  Body mass index is 23.43 kg/m².    Intake/Output Summary (Last 24 hours) at 3/4/2023 1322  Last data filed at 3/4/2023  0000  Gross per 24 hour   Intake 120 ml   Output 290 ml   Net -170 ml        Physical Exam  Vitals and nursing note reviewed.   Constitutional:       General: She is not in acute distress.     Appearance: She is ill-appearing (chronically). She is not toxic-appearing or diaphoretic.   HENT:      Head: Normocephalic and atraumatic.      Right Ear: External ear normal.      Left Ear: External ear normal.      Mouth/Throat:      Mouth: Mucous membranes are moist.   Eyes:      General: No scleral icterus.        Right eye: No discharge.         Left eye: No discharge.   Cardiovascular:      Rate and Rhythm: Normal rate and regular rhythm.      Pulses: Normal pulses.      Heart sounds: Normal heart sounds. No murmur heard.    No friction rub. No gallop.   Pulmonary:      Effort: Pulmonary effort is normal. No respiratory distress.      Breath sounds: No stridor. No wheezing, rhonchi or rales.   Chest:      Chest wall: No tenderness.   Abdominal:      General: Abdomen is flat. Bowel sounds are normal. There is no distension.      Palpations: There is no mass.      Tenderness: There is no abdominal tenderness. There is no guarding or rebound.      Hernia: No hernia is present.   Musculoskeletal:      Cervical back: Normal range of motion and neck supple.      Right lower leg: No edema.      Left lower leg: No edema.   Skin:     General: Skin is warm and dry.   Neurological:      Mental Status: She is alert and oriented to person, place, and time.      Sensory: No sensory deficit.      Motor: No weakness.      Comments: + mild asterixis, oriented but pressured speech   Psychiatric:         Behavior: Behavior normal.       Significant Labs: All pertinent labs within the past 24 hours have been reviewed.    Recent Results (from the past 24 hour(s))   POCT glucose    Collection Time: 03/03/23  7:54 PM   Result Value Ref Range    POCT Glucose 89 70 - 110 mg/dL   Protime-INR    Collection Time: 03/04/23  5:59 AM   Result Value  Ref Range    Prothrombin Time 11.4 9.0 - 12.5 sec    INR 1.1 0.8 - 1.2   Osmolality, Serum    Collection Time: 03/04/23  5:59 AM   Result Value Ref Range    Osmolality 281 275 - 295 mOsm/kg   Tacrolimus level    Collection Time: 03/04/23  5:59 AM   Result Value Ref Range    Tacrolimus Lvl 6.3 5.0 - 15.0 ng/mL   CBC auto differential    Collection Time: 03/04/23  5:59 AM   Result Value Ref Range    WBC 2.99 (L) 3.90 - 12.70 K/uL    RBC 3.26 (L) 4.00 - 5.40 M/uL    Hemoglobin 10.0 (L) 12.0 - 16.0 g/dL    Hematocrit 29.4 (L) 37.0 - 48.5 %    MCV 90 82 - 98 fL    MCH 30.7 27.0 - 31.0 pg    MCHC 34.0 32.0 - 36.0 g/dL    RDW 14.5 11.5 - 14.5 %    Platelets 67 (L) 150 - 450 K/uL    MPV 12.2 9.2 - 12.9 fL    Immature Granulocytes 0.3 0.0 - 0.5 %    Gran # (ANC) 1.7 (L) 1.8 - 7.7 K/uL    Immature Grans (Abs) 0.01 0.00 - 0.04 K/uL    Lymph # 0.6 (L) 1.0 - 4.8 K/uL    Mono # 0.5 0.3 - 1.0 K/uL    Eos # 0.2 0.0 - 0.5 K/uL    Baso # 0.03 0.00 - 0.20 K/uL    nRBC 0 0 /100 WBC    Gran % 55.6 38.0 - 73.0 %    Lymph % 18.4 18.0 - 48.0 %    Mono % 16.7 (H) 4.0 - 15.0 %    Eosinophil % 8.0 0.0 - 8.0 %    Basophil % 1.0 0.0 - 1.9 %    Differential Method Automated    Comprehensive metabolic panel    Collection Time: 03/04/23  5:59 AM   Result Value Ref Range    Sodium 130 (L) 136 - 145 mmol/L    Potassium 3.6 3.5 - 5.1 mmol/L    Chloride 96 95 - 110 mmol/L    CO2 26 23 - 29 mmol/L    Glucose 56 (L) 70 - 110 mg/dL    BUN 22 (H) 6 - 20 mg/dL    Creatinine 1.5 (H) 0.5 - 1.4 mg/dL    Calcium 8.6 (L) 8.7 - 10.5 mg/dL    Total Protein 5.3 (L) 6.0 - 8.4 g/dL    Albumin 2.4 (L) 3.5 - 5.2 g/dL    Total Bilirubin 1.5 (H) 0.1 - 1.0 mg/dL    Alkaline Phosphatase 516 (H) 55 - 135 U/L    AST 60 (H) 10 - 40 U/L    ALT 37 10 - 44 U/L    Anion Gap 8 8 - 16 mmol/L    eGFR 41.2 (A) >60 mL/min/1.73 m^2   Magnesium    Collection Time: 03/04/23  5:59 AM   Result Value Ref Range    Magnesium 1.9 1.6 - 2.6 mg/dL   Sodium, Random Urine    Collection Time:  03/04/23 12:25 PM   Result Value Ref Range    Sodium, Urine <10 (L) 20 - 250 mmol/L         Significant Imaging: I have reviewed all pertinent imaging results/findings within the past 24 hours.    Imaging Results              US Doppler Liver Transplant Post (xpd) (Final result)  Result time 03/01/23 02:08:28   Procedure changed from US Liver with Doppler (xpd)     Final result by Clark Morales MD (03/01/23 02:08:28)                   Impression:      No evidence of portal venous thrombosis as questioned.  Satisfactory Doppler evaluation of the liver transplant.    Cirrhotic morphology of the hepatic allograft.  Questionable 2.8 cm hypoechoic area at the periphery of the left hepatic lobe which may reflect changes related to nodular contour versus possible lesion.  Consider further evaluation with multiphase CT or MRI if clinically warranted.    Electronically signed by resident: Lazaro Pacheco  Date:    03/01/2023  Time:    01:36    Electronically signed by: Clark Morales MD  Date:    03/01/2023  Time:    02:08               Narrative:    EXAMINATION:  US DOPPLER LIVER TRANSPLANT POST (XPD)    CLINICAL HISTORY:  R/O portal vein thrombosis; R/O Ascites;    TECHNIQUE:  Limited abdominal ultrasound of the transplant liver with Doppler evaluation.  Color and spectral Doppler were performed.    COMPARISON:  Liver transplant Doppler 01/03/2023, 03/25/2022, CT abdomen pelvis 03/24/2022    FINDINGS:  Patient is status post orthotopic liver transplant in 2002.  Liver allograft is demonstrates a nodular contour with heterogeneous echotexture suggestive of cirrhosis.  2.3 x 2.5 x 2.8 cm questionable slightly hypoattenuating area in the left hepatic lobe demonstrating vascularity, which may reflect nodular changes related to cirrhosis over a left hepatic lobe lesion.  No fluid collections or significant upper abdominal ascites.    The common duct is not significantly distended for post cholecystectomy status,  maximally measuring 8 mm and similar to prior noting it measured up to 7 mm on 03/25/2022.  No dilated intrahepatic radicles are seen.    Color flow and spectral waveform analysis was performed.  The main portal vein, right portal vein, left portal vein, middle hepatic vein, right hepatic vein, left hepatic vein, and IVC are patent with proper directional flow.  Peak velocity of the main portal vein is 30 cm/sec.    Anastomosis site of the main hepatic artery demonstrates a peak systolic velocity 131 cm/sec.  (Previously 137)    Main hepatic artery demonstrates resistive index 0.77 with normal waveform.  (Previously 0.79)    Left hepatic artery shows resistive index 0.75 with normal waveform.  (Previously 0.81)    Anterior branch of the right hepatic artery demonstrates resistive index 0.75 with normal waveform.  (Previously 0.81)    Posterior branch of the right hepatic artery demonstrates resistive index 0.72 with normal waveform.  (Previously 0.79)                                       CT Soft Tissue Neck With Contrast (Final result)  Result time 02/28/23 23:47:24      Final result by Clark Morales MD (02/28/23 23:47:24)                   Impression:      No drainable abscess identified in the neck soft tissues.  Further evaluation/follow-up including direct visualization as clinically warranted.    Patulous appearance of the esophagus with fluid and air in the esophageal lumen.    Additional findings discussed in the body of the report.      Electronically signed by: Clark Morales MD  Date:    02/28/2023  Time:    23:47               Narrative:    EXAMINATION:  CT SOFT TISSUE NECK WITH CONTRAST    CLINICAL HISTORY:  Neck mass, nonpulsatile;Neck abscess, deep tissue; No additional clinical history or physical exam provided in the chart at the time of dictation.    TECHNIQUE:  CT images obtained throughout the region of the neck after the administration of 75 mL Omnipaque 350 intravenous contrast. Axial,  sagittal and coronal reconstructions were performed.    COMPARISON:  CT chest, 09/04/2020.    FINDINGS:  Exam quality is limited by suboptimal positioning and motion artifact.    No abnormal fluid collection identified in the neck soft tissues.  No convincing enhancing mass identified in the neck soft tissues.  Recommend correlation with physical exam and/or direct visualization as clinically warranted.    Epiglottis is unremarkable.  The parotid and submandibular glands are symmetric without significant adjacent fat stranding.  Subcentimeter hypodensity in the left lobe of the thyroid gland, too small for dedicated follow-up.    Major vessels of the neck appear patent.  Atherosclerosis and narrowing of the bilateral carotid arteries.  Limited intracranial evaluation appears negative for acute finding.  The visualized paranasal sinuses are essentially clear.  Postoperative changes in the right mastoid air cells.  Left mastoid air cells are essentially clear.  Postoperative changes of suboccipital craniectomy.    There is a questionable partially visualized nodule versus atelectasis or scarring in the right upper lobe (axial series 3, image 394).  This finding is likely similar when compared with prior CT dated 09/04/2020.  Lung apices are otherwise unremarkable.    Patulous appearance of the visualized esophagus with air and fluid in the mid esophageal lumen.    No aggressive osseous lesion identified.  Minimal vertebral body height loss at T3, likely chronic.  Partially visualized postoperative changes of prior median sternotomy.                                       X-Ray Chest AP Portable (Final result)  Result time 02/28/23 19:36:17      Final result by Samuel Grant MD (02/28/23 19:36:17)                   Impression:      1. No acute cardiopulmonary process.      Electronically signed by: Samuel Grant MD  Date:    02/28/2023  Time:    19:36               Narrative:    EXAMINATION:  XR CHEST AP  PORTABLE    CLINICAL HISTORY:  ams;    TECHNIQUE:  Single frontal view of the chest was performed.    COMPARISON:  03/24/2022    FINDINGS:  The cardiomediastinal silhouette is not enlarged, magnified by technique.  There is no pleural effusion.  The trachea is midline.  The lungs are symmetrically expanded bilaterally without evidence of acute parenchymal process. No large focal consolidation seen.  There is no pneumothorax.  The osseous structures are remarkable for degenerative changes.  Patient is rotated..                                       CT Head Without Contrast (Final result)  Result time 02/28/23 18:54:58      Final result by González Kwok MD (02/28/23 18:54:58)                   Impression:      No evidence of acute intracranial pathology.  Additional evaluation, as clinically warranted.    Stable multifocal parenchymal calcifications, nonspecific and potentially related to remote infectious or metabolic etiology.    Electronically signed by resident: Jakob Monteiro  Date:    02/28/2023  Time:    18:36    Electronically signed by: González Kwok MD  Date:    02/28/2023  Time:    18:54               Narrative:    EXAMINATION:  CT HEAD WITHOUT CONTRAST    CLINICAL HISTORY:  Headache, chronic, new features or increased frequency;    TECHNIQUE:  Low dose axial CT images obtained throughout the head without the use of intravenous contrast.  Axial, sagittal and coronal reconstructions were performed.    COMPARISON:  CT 09/25/2020, MRI 04/24/2015    FINDINGS:  Intracranial compartment:    The ventricular configuration appears stable from prior exam without evidence of hydrocephalus.    Brain parenchyma appears unchanged with multifocal parenchymal calcifications stable in distribution compared to the prior exam.  No parenchymal mass, hemorrhage, edema or major vascular distribution infarct. No extra-axial blood or fluid collections.    Skull/extracranial contents (limited evaluation):    Remote postoperative  changes of suboccipital craniectomy.  Prior right mastoidectomy.  Left mastoid air cells are clear.  Visualized paranasal sinuses are clear.

## 2023-03-04 NOTE — ASSESSMENT & PLAN NOTE
Hyponatremia    Patient used to take demeclocycline at home. Patient's sodium level within normal limits upon admission. Will continue to trend.  Na 130 today; urine studies not yet collected  IV fluids per JACQUE

## 2023-03-04 NOTE — ASSESSMENT & PLAN NOTE
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 Patient also with abdominal distention and generalized abdominal pain concerning for underlying ascites. She denies any recent GI bleeding. Follows with Liver Transplant clinic as an outpatient.    - Hepatology consulted, appreciate recommendations  -During IR ultrasound evaluation, no ascites identified for safe paracentesis  -U/S liver w/ Questionable 2.8 cm hypoechoic area at the periphery of the left hepatic lobe; CT triple phase to further evaluate inconclusive; discussed with hepatology, MRI abdomen " No suspicious enhancing hepatic lesion.  The lesion described on prior CT is less conspicuous on MRI without concerning enhancement."  - lactulose 30 g TID  - rifaximin 500 mg BID    MELD-Na score: 19 at 3/4/2023  5:59 AM  MELD score: 13 at 3/4/2023  5:59 AM  Calculated from:  Serum Creatinine: 1.5 mg/dL at 3/4/2023  5:59 AM  Serum Sodium: 130 mmol/L at 3/4/2023  5:59 AM  Total Bilirubin: 1.5 mg/dL at 3/4/2023  5:59 AM  INR(ratio): 1.1 at 3/4/2023  5:59 AM  Age: 52 years    "

## 2023-03-04 NOTE — PLAN OF CARE
Patient rested well last night. VS were WNL and there were no SS of distress. PRN medication was given as requested. Patient spit up PRN pain medication and said she was nauseous. Urine was not collected due to patient removing the hat from the toilet stating comfort and second attempt had fecal matter and could not be used. Will continue to monitor and pass on care to oncoming nurse.

## 2023-03-04 NOTE — NURSING
Plan of care reviewed with pt. PT aaox4. Pt still is expressing some confusion. Ataxia still present. Pt complaining of a horrible headache relieved with oxy and dilaudid. Pt also complaining of nausea.  VSS. Pt remained free of falls, trauma, and injury. Will continue to monitor

## 2023-03-04 NOTE — PROGRESS NOTES
"[facility] - Tele[facility]  Progress Note    Patient Name: Elda Hernandez  MRN: [id number]  Patient Class: IP- Inpatient   Admission Date: 2/28/2023  Length of Stay: 4 days  Attending Physician: Lizeth Worley MD  Primary Care Provider: David Lorenzo MD        Subjective:     Principal Problem:Decompensated liver disease        HPI:  Elda Hernandez is a 52-year-old woman a past medical history of primary hypertension, cirrhosis of transplanted liver, Von Gierke disease s/p transplant, and hypothyroidism, who presents to the emergency department with altered mental status. The patient is accompanied by her  who assists in providing the history. Per report, the patient has been confused and repetitive with her speech per her 's report for the past week. The patient follows closely with the Liver Transplant clinic and recently had her ammonia checked given the confusion and it was noted that it was elevated. She was recently started on lactulose by her provider and states that she has been having at least one bowel movement per day with the exception of yesterday when the patient said she had multiple episodes of loose, runny stool. She also notes increasing abdominal distention and generalized abdominal pain. The patient says that the last time she was hospitalized, a paracentesis was attempted but was not successful given there was not a pocket of fluid that was able to be drained. The patient denies any fevers, chills or rigors but states that she often does not become febrile because of her immunosuppression. Additionally, the patient complains of a left-sided neck mass that has been present for "some time." She says that her primary care physician is aware of the mass and that he ordered an ultrasound to further investigate the mass but says that it has not yet been completed. She also complains of a severe headache that is generalized. She denies any chest pain, shortness of " "breath or dyspnea on exertion.    In the emergency department, the patient was noted to be have stable vital signs upon arrival. Her labs were significant for a chronic neutropenia and thrombocytopenia with platelet count of 54. Potassium was noted to be 3.3. Magnesium was 1.5. The patient's liver enzymes elevated with AST 77 ALT 52 and alkaline phosphatase 582. Bilirubin at 2.3. Ammonia at 56. A CT head was ordered which revealed no acute abnormality. The patient was admitted to Hospital Medicine for further management.      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 Asterixis noted on examination. Mildly elevated LFTs. Started on lactulose and rifaximin. Per IR, During ultrasound evaluation, no ascites identified for safe paracentesis.  Liver ultrasound with Doppler without evidence of portal venous thrombosis and without significant ascites but notable for 2.8 cm hypo attenuation.  MRI brain W WO with subtle findings concerning for hepatic encephalopathy though other toxic or metabolic derangement or encephalitis could have similar appearance; no acute process.  Also notable for Mild chronic small vessel ischemic change with multiple small remote bilateral cerebellar infarcts.  Discussed with hepatology, and concern that encephalopathy not solely due to hepatic encephalopathy.  Neurology consulted. "MRI with objective findings of cerebellar infarcts likely accounting for physical presentation. " CT abdomen pelvis with contrast completed. "Mild interval increase in size of hypodense hepatic nodule.  The questionable lesion identified on ultrasound not confidently seen on this single phase exam though the nodular contour of the liver appears similar prior CT.  Triple phase liver study can " "be repeated at no additional charge. "Discussed with hepatology.  MRI abdomen ordered to further evaluate. " No suspicious enhancing hepatic lesion.  The lesion described on prior CT is less conspicuous on MRI without concerning enhancement."      Interval History:   Patient seen and examined at bedside.  Awake and alert.  Children at bedside.    Notes headache, nausea, vomiting overnight.  None this morning.  Not really eating the hospital food, however drinking smoothie evelin.  She is oriented to person, place, time.  Less pressured speech today.  She is concerned that lactulose and bowel movements may be contributing to her dehydration.  Patient updated regarding care plan.    Review of Systems   Constitutional:  Negative for chills, fatigue and fever.   HENT:  Negative for congestion, postnasal drip, rhinorrhea, sneezing and sore throat.    Eyes:  Negative for photophobia and visual disturbance.   Respiratory:  Negative for cough, choking, chest tightness and shortness of breath.    Cardiovascular:  Negative for chest pain, palpitations and leg swelling.   Gastrointestinal:  Negative for abdominal distention, abdominal pain, constipation, diarrhea, nausea and vomiting.   Endocrine: Negative for polyphagia and polyuria.   Genitourinary:  Negative for decreased urine volume, difficulty urinating, dysuria and urgency.   Musculoskeletal:  Positive for gait problem. Negative for arthralgias and myalgias.   Skin:  Negative for rash and wound.   Neurological:  Positive for dizziness, tremors, light-headedness and headaches. Negative for seizures and syncope.   Psychiatric/Behavioral:  Negative for agitation and decreased concentration.    Objective:     Vital Signs (Most Recent):  Temp: 98.7 °F (37.1 °C) (03/04/23 1129)  Pulse: 64 (03/04/23 1129)  Resp: 18 (03/04/23 1129)  BP: (!) 109/55 (03/04/23 1129)  SpO2: (!) 94 % (03/04/23 1129)   Vital Signs (24h Range):  Temp:  [97.8 °F (36.6 °C)-98.8 °F (37.1 °C)] 98.7 °F (37.1 " °C)  Pulse:  [64-70] 64  Resp:  [12-18] 18  SpO2:  [94 %-100 %] 94 %  BP: (107-149)/(55-69) 109/55     Weight: 52.6 kg (116 lb)  Body mass index is 23.43 kg/m².    Intake/Output Summary (Last 24 hours) at 3/4/2023 1322  Last data filed at 3/4/2023 0000  Gross per 24 hour   Intake 120 ml   Output 290 ml   Net -170 ml        Physical Exam  Vitals and nursing note reviewed.   Constitutional:       General: She is not in acute distress.     Appearance: She is ill-appearing (chronically). She is not toxic-appearing or diaphoretic.   HENT:      Head: Normocephalic and atraumatic.      Right Ear: External ear normal.      Left Ear: External ear normal.      Mouth/Throat:      Mouth: Mucous membranes are moist.   Eyes:      General: No scleral icterus.        Right eye: No discharge.         Left eye: No discharge.   Cardiovascular:      Rate and Rhythm: Normal rate and regular rhythm.      Pulses: Normal pulses.      Heart sounds: Normal heart sounds. No murmur heard.    No friction rub. No gallop.   Pulmonary:      Effort: Pulmonary effort is normal. No respiratory distress.      Breath sounds: No stridor. No wheezing, rhonchi or rales.   Chest:      Chest wall: No tenderness.   Abdominal:      General: Abdomen is flat. Bowel sounds are normal. There is no distension.      Palpations: There is no mass.      Tenderness: There is no abdominal tenderness. There is no guarding or rebound.      Hernia: No hernia is present.   Musculoskeletal:      Cervical back: Normal range of motion and neck supple.      Right lower leg: No edema.      Left lower leg: No edema.   Skin:     General: Skin is warm and dry.   Neurological:      Mental Status: She is alert and oriented to person, place, and time.      Sensory: No sensory deficit.      Motor: No weakness.      Comments: + mild asterixis, oriented but pressured speech   Psychiatric:         Behavior: Behavior normal.       Significant Labs: All pertinent labs within the past 24  hours have been reviewed.    Recent Results (from the past 24 hour(s))   POCT glucose    Collection Time: 03/03/23  7:54 PM   Result Value Ref Range    POCT Glucose 89 70 - 110 mg/dL   Protime-INR    Collection Time: 03/04/23  5:59 AM   Result Value Ref Range    Prothrombin Time 11.4 9.0 - 12.5 sec    INR 1.1 0.8 - 1.2   Osmolality, Serum    Collection Time: 03/04/23  5:59 AM   Result Value Ref Range    Osmolality 281 275 - 295 mOsm/kg   Tacrolimus level    Collection Time: 03/04/23  5:59 AM   Result Value Ref Range    Tacrolimus Lvl 6.3 5.0 - 15.0 ng/mL   CBC auto differential    Collection Time: 03/04/23  5:59 AM   Result Value Ref Range    WBC 2.99 (L) 3.90 - 12.70 K/uL    RBC 3.26 (L) 4.00 - 5.40 M/uL    Hemoglobin 10.0 (L) 12.0 - 16.0 g/dL    Hematocrit 29.4 (L) 37.0 - 48.5 %    MCV 90 82 - 98 fL    MCH 30.7 27.0 - 31.0 pg    MCHC 34.0 32.0 - 36.0 g/dL    RDW 14.5 11.5 - 14.5 %    Platelets 67 (L) 150 - 450 K/uL    MPV 12.2 9.2 - 12.9 fL    Immature Granulocytes 0.3 0.0 - 0.5 %    Gran # (ANC) 1.7 (L) 1.8 - 7.7 K/uL    Immature Grans (Abs) 0.01 0.00 - 0.04 K/uL    Lymph # 0.6 (L) 1.0 - 4.8 K/uL    Mono # 0.5 0.3 - 1.0 K/uL    Eos # 0.2 0.0 - 0.5 K/uL    Baso # 0.03 0.00 - 0.20 K/uL    nRBC 0 0 /100 WBC    Gran % 55.6 38.0 - 73.0 %    Lymph % 18.4 18.0 - 48.0 %    Mono % 16.7 (H) 4.0 - 15.0 %    Eosinophil % 8.0 0.0 - 8.0 %    Basophil % 1.0 0.0 - 1.9 %    Differential Method Automated    Comprehensive metabolic panel    Collection Time: 03/04/23  5:59 AM   Result Value Ref Range    Sodium 130 (L) 136 - 145 mmol/L    Potassium 3.6 3.5 - 5.1 mmol/L    Chloride 96 95 - 110 mmol/L    CO2 26 23 - 29 mmol/L    Glucose 56 (L) 70 - 110 mg/dL    BUN 22 (H) 6 - 20 mg/dL    Creatinine 1.5 (H) 0.5 - 1.4 mg/dL    Calcium 8.6 (L) 8.7 - 10.5 mg/dL    Total Protein 5.3 (L) 6.0 - 8.4 g/dL    Albumin 2.4 (L) 3.5 - 5.2 g/dL    Total Bilirubin 1.5 (H) 0.1 - 1.0 mg/dL    Alkaline Phosphatase 516 (H) 55 - 135 U/L    AST 60 (H) 10 -  40 U/L    ALT 37 10 - 44 U/L    Anion Gap 8 8 - 16 mmol/L    eGFR 41.2 (A) >60 mL/min/1.73 m^2   Magnesium    Collection Time: 03/04/23  5:59 AM   Result Value Ref Range    Magnesium 1.9 1.6 - 2.6 mg/dL   Sodium, Random Urine    Collection Time: 03/04/23 12:25 PM   Result Value Ref Range    Sodium, Urine <10 (L) 20 - 250 mmol/L         Significant Imaging: I have reviewed all pertinent imaging results/findings within the past 24 hours.    Imaging Results              US Doppler Liver Transplant Post (xpd) (Final result)  Result time 03/01/23 02:08:28   Procedure changed from US Liver with Doppler (xpd)     Final result by Clark Morales MD (03/01/23 02:08:28)                   Impression:      No evidence of portal venous thrombosis as questioned.  Satisfactory Doppler evaluation of the liver transplant.    Cirrhotic morphology of the hepatic allograft.  Questionable 2.8 cm hypoechoic area at the periphery of the left hepatic lobe which may reflect changes related to nodular contour versus possible lesion.  Consider further evaluation with multiphase CT or MRI if clinically warranted.    Electronically signed by resident: Lazaro Pacheco  Date:    03/01/2023  Time:    01:36    Electronically signed by: Clark Morales MD  Date:    03/01/2023  Time:    02:08               Narrative:    EXAMINATION:  US DOPPLER LIVER TRANSPLANT POST (XPD)    CLINICAL HISTORY:  R/O portal vein thrombosis; R/O Ascites;    TECHNIQUE:  Limited abdominal ultrasound of the transplant liver with Doppler evaluation.  Color and spectral Doppler were performed.    COMPARISON:  Liver transplant Doppler 01/03/2023, 03/25/2022, CT abdomen pelvis 03/24/2022    FINDINGS:  Patient is status post orthotopic liver transplant in 2002.  Liver allograft is demonstrates a nodular contour with heterogeneous echotexture suggestive of cirrhosis.  2.3 x 2.5 x 2.8 cm questionable slightly hypoattenuating area in the left hepatic lobe demonstrating  vascularity, which may reflect nodular changes related to cirrhosis over a left hepatic lobe lesion.  No fluid collections or significant upper abdominal ascites.    The common duct is not significantly distended for post cholecystectomy status, maximally measuring 8 mm and similar to prior noting it measured up to 7 mm on 03/25/2022.  No dilated intrahepatic radicles are seen.    Color flow and spectral waveform analysis was performed.  The main portal vein, right portal vein, left portal vein, middle hepatic vein, right hepatic vein, left hepatic vein, and IVC are patent with proper directional flow.  Peak velocity of the main portal vein is 30 cm/sec.    Anastomosis site of the main hepatic artery demonstrates a peak systolic velocity 131 cm/sec.  (Previously 137)    Main hepatic artery demonstrates resistive index 0.77 with normal waveform.  (Previously 0.79)    Left hepatic artery shows resistive index 0.75 with normal waveform.  (Previously 0.81)    Anterior branch of the right hepatic artery demonstrates resistive index 0.75 with normal waveform.  (Previously 0.81)    Posterior branch of the right hepatic artery demonstrates resistive index 0.72 with normal waveform.  (Previously 0.79)                                       CT Soft Tissue Neck With Contrast (Final result)  Result time 02/28/23 23:47:24      Final result by Clark Morales MD (02/28/23 23:47:24)                   Impression:      No drainable abscess identified in the neck soft tissues.  Further evaluation/follow-up including direct visualization as clinically warranted.    Patulous appearance of the esophagus with fluid and air in the esophageal lumen.    Additional findings discussed in the body of the report.      Electronically signed by: Clark Morales MD  Date:    02/28/2023  Time:    23:47               Narrative:    EXAMINATION:  CT SOFT TISSUE NECK WITH CONTRAST    CLINICAL HISTORY:  Neck mass, nonpulsatile;Neck abscess, deep  tissue; No additional clinical history or physical exam provided in the chart at the time of dictation.    TECHNIQUE:  CT images obtained throughout the region of the neck after the administration of 75 mL Omnipaque 350 intravenous contrast. Axial, sagittal and coronal reconstructions were performed.    COMPARISON:  CT chest, 09/04/2020.    FINDINGS:  Exam quality is limited by suboptimal positioning and motion artifact.    No abnormal fluid collection identified in the neck soft tissues.  No convincing enhancing mass identified in the neck soft tissues.  Recommend correlation with physical exam and/or direct visualization as clinically warranted.    Epiglottis is unremarkable.  The parotid and submandibular glands are symmetric without significant adjacent fat stranding.  Subcentimeter hypodensity in the left lobe of the thyroid gland, too small for dedicated follow-up.    Major vessels of the neck appear patent.  Atherosclerosis and narrowing of the bilateral carotid arteries.  Limited intracranial evaluation appears negative for acute finding.  The visualized paranasal sinuses are essentially clear.  Postoperative changes in the right mastoid air cells.  Left mastoid air cells are essentially clear.  Postoperative changes of suboccipital craniectomy.    There is a questionable partially visualized nodule versus atelectasis or scarring in the right upper lobe (axial series 3, image 394).  This finding is likely similar when compared with prior CT dated 09/04/2020.  Lung apices are otherwise unremarkable.    Patulous appearance of the visualized esophagus with air and fluid in the mid esophageal lumen.    No aggressive osseous lesion identified.  Minimal vertebral body height loss at T3, likely chronic.  Partially visualized postoperative changes of prior median sternotomy.                                       X-Ray Chest AP Portable (Final result)  Result time 02/28/23 19:36:17      Final result by Samuel COSBY  MD Juanita (02/28/23 19:36:17)                   Impression:      1. No acute cardiopulmonary process.      Electronically signed by: Samuel Grant MD  Date:    02/28/2023  Time:    19:36               Narrative:    EXAMINATION:  XR CHEST AP PORTABLE    CLINICAL HISTORY:  ams;    TECHNIQUE:  Single frontal view of the chest was performed.    COMPARISON:  03/24/2022    FINDINGS:  The cardiomediastinal silhouette is not enlarged, magnified by technique.  There is no pleural effusion.  The trachea is midline.  The lungs are symmetrically expanded bilaterally without evidence of acute parenchymal process. No large focal consolidation seen.  There is no pneumothorax.  The osseous structures are remarkable for degenerative changes.  Patient is rotated..                                       CT Head Without Contrast (Final result)  Result time 02/28/23 18:54:58      Final result by González Kwok MD (02/28/23 18:54:58)                   Impression:      No evidence of acute intracranial pathology.  Additional evaluation, as clinically warranted.    Stable multifocal parenchymal calcifications, nonspecific and potentially related to remote infectious or metabolic etiology.    Electronically signed by resident: Jakob Monteiro  Date:    02/28/2023  Time:    18:36    Electronically signed by: González Kwok MD  Date:    02/28/2023  Time:    18:54               Narrative:    EXAMINATION:  CT HEAD WITHOUT CONTRAST    CLINICAL HISTORY:  Headache, chronic, new features or increased frequency;    TECHNIQUE:  Low dose axial CT images obtained throughout the head without the use of intravenous contrast.  Axial, sagittal and coronal reconstructions were performed.    COMPARISON:  CT 09/25/2020, MRI 04/24/2015    FINDINGS:  Intracranial compartment:    The ventricular configuration appears stable from prior exam without evidence of hydrocephalus.    Brain parenchyma appears unchanged with multifocal parenchymal calcifications  "stable in distribution compared to the prior exam.  No parenchymal mass, hemorrhage, edema or major vascular distribution infarct. No extra-axial blood or fluid collections.    Skull/extracranial contents (limited evaluation):    Remote postoperative changes of suboccipital craniectomy.  Prior right mastoidectomy.  Left mastoid air cells are clear.  Visualized paranasal sinuses are clear.                                          Assessment/Plan:      * Decompensated liver disease  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  Encephalopathy    Patient presenting with confusion and disorientation with notable flapping tremor concerning for hepatic encephalopathy. 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 Patient also with abdominal distention and generalized abdominal pain concerning for underlying ascites. She denies any recent GI bleeding. Follows with Liver Transplant clinic as an outpatient.    - Hepatology consulted, appreciate recommendations  -During IR ultrasound evaluation, no ascites identified for safe paracentesis  -U/S liver w/ Questionable 2.8 cm hypoechoic area at the periphery of the left hepatic lobe; CT triple phase to further evaluate inconclusive; discussed with hepatology, MRI abdomen " No suspicious enhancing hepatic lesion.  The lesion described on prior CT is less conspicuous on MRI without concerning enhancement."  - lactulose 30 g TID  - rifaximin 500 mg BID    MELD-Na score: 19 at 3/4/2023  5:59 AM  MELD score: 13 at 3/4/2023  5:59 AM  Calculated from:  Serum Creatinine: 1.5 mg/dL at 3/4/2023  5:59 AM  Serum Sodium: 130 mmol/L at 3/4/2023  5:59 AM  Total Bilirubin: 1.5 mg/dL at 3/4/2023  5:59 AM  INR(ratio): 1.1 at 3/4/2023  5:59 AM  Age: 52 years      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  Tacrolimus 0.5 mg BID. Trend tacrolimus levels daily.  Appreciate hepatology input.      Ataxia  Per neurology, MRI with " objective findings of cerebellar infarcts likely accounting for physical presentation.   Physical exam findings consistent with and correlates to old cerebellar infarct. Pt would benefit from outpatient physical/vestibular therapy.      Depression  Patient has persistent depression which is unknown and is currently controlled. Will Continue anti-depressant medications. We will not consult psychiatry at this time. Patient does not display psychosis at this time. Continue to monitor closely and adjust plan of care as needed. venlafaxine 150 mg daily.        JACQUE (acute kidney injury)  Patient with acute kidney injury likely due to IVVD/dehydration JACQUE is currently worsening. Labs reviewed- Renal function/electrolytes with Estimated Creatinine Clearance: 33 mL/min (A) (based on SCr of 1.5 mg/dL (H)). according to latest data. Monitor urine output and serial BMP and adjust therapy as needed. Avoid nephrotoxins and renally dose meds for GFR listed above.   Recent contrast with studies in addition to poor p.o. intake and volume loss with lactulose  IV fluids, repeat BMP in afternoon      Hypothyroidism  Resume home levothyroxine.      Anxiety  Home medication includes Klonopin 0.5 mg b.i.d. p.r.n.  Potentially contributing to encephalopathy, will discuss with patient regarding reduction and eventual cessation      Depression, recurrent  Resume home           SIADH (syndrome of inappropriate ADH production)  Hyponatremia    Patient used to take demeclocycline at home. Patient's sodium level within normal limits upon admission. Will continue to trend.  Na 130 today; urine studies not yet collected  IV fluids per JACQUE        VTE Risk Mitigation (From admission, onward)         Ordered     Place sequential compression device  Until discontinued         03/01/23 0151     IP VTE LOW RISK PATIENT  Once         02/28/23 1958                Discharge Planning   MARILEE: 3/6/2023     Code Status: Full Code   Is the patient medically  ready for discharge?: No    Reason for patient still in hospital (select all that apply): Patient new problem, Patient trending condition, Treatment, Consult recommendations and Pending disposition  Discharge Plan A: Home with family, Home Health   Discharge Delays: None known at this time              Lizeth Worley MD  Department of Hospital Medicine   [facility] - Telemetry Stepdown

## 2023-03-04 NOTE — TREATMENT PLAN
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 She did not have enough ascites for sampling to rule out SBP. She is status post up-titration of Lactulose with improvement in mentation. Neurological consulted with regards to ataxia and status post CT head and MRI showing cerebellar infarcts; recommendation for outpatient vestibular therapy.     Interval History  Labs notable for JACQUE (Cr 1.5), stable hyperbilirubinemia (1.5), and stable transaminitis (AST 60). Tacrolimus level 6.3.     Plan  - Lactulose TID and Rifaximin BID; titrate dose frequency of Lactulose to 3 bowel movement daily.   - Tacrolimus 0.5 mg daily.   - Hold home Lasix with JACQUE.   - Minimize use of sedating agents that may precipitate encephalopathy (e.g. opioids and benzo's).   - CMP, INR, and Tacrolimus level daily.     Thank you for involving us in the care of Elda Hernandez. Please call with any additional concerns or questions.        Jonny Feldman MD, PGY-V  Gastroenterology Fellow  [facility]

## 2023-03-04 NOTE — NURSING
Patient was administered oxycodone but shortly after had an episode of emesis. Pill was visible and I discarded pill and vomit into toilet. I provided IV pain medication and antinausea medication.

## 2023-03-04 NOTE — PLAN OF CARE
Plan of care reviewed with pt. PT aaox4.  Pt still complaining of a horrible headache relieved with oxy and dilaudid. Pt also complaining of nausea, PRN meds given. NA: 130, Fluids running as ordered.  VSS. Pt remained free of falls, trauma, and injury. Will continue to monitor

## 2023-03-05 LAB
ALBUMIN SERPL BCP-MCNC: 2.7 G/DL (ref 3.5–5.2)
ALP SERPL-CCNC: 550 U/L (ref 55–135)
ALT SERPL W/O P-5'-P-CCNC: 41 U/L (ref 10–44)
ANION GAP SERPL CALC-SCNC: 6 MMOL/L (ref 8–16)
AST SERPL-CCNC: 65 U/L (ref 10–40)
BACTERIA BLD CULT: NORMAL
BACTERIA BLD CULT: NORMAL
BASOPHILS # BLD AUTO: 0.04 K/UL (ref 0–0.2)
BASOPHILS NFR BLD: 1.2 % (ref 0–1.9)
BILIRUB SERPL-MCNC: 2 MG/DL (ref 0.1–1)
BUN SERPL-MCNC: 20 MG/DL (ref 6–20)
CALCIUM SERPL-MCNC: 8.4 MG/DL (ref 8.7–10.5)
CHLORIDE SERPL-SCNC: 98 MMOL/L (ref 95–110)
CO2 SERPL-SCNC: 23 MMOL/L (ref 23–29)
CREAT SERPL-MCNC: 1.3 MG/DL (ref 0.5–1.4)
DIFFERENTIAL METHOD: ABNORMAL
EOSINOPHIL # BLD AUTO: 0.4 K/UL (ref 0–0.5)
EOSINOPHIL NFR BLD: 12.2 % (ref 0–8)
ERYTHROCYTE [DISTWIDTH] IN BLOOD BY AUTOMATED COUNT: 14.8 % (ref 11.5–14.5)
EST. GFR  (NO RACE VARIABLE): 48.9 ML/MIN/1.73 M^2
GLUCOSE SERPL-MCNC: 78 MG/DL (ref 70–110)
HCT VFR BLD AUTO: 30.8 % (ref 37–48.5)
HGB BLD-MCNC: 10.6 G/DL (ref 12–16)
IMM GRANULOCYTES # BLD AUTO: 0 K/UL (ref 0–0.04)
IMM GRANULOCYTES NFR BLD AUTO: 0 % (ref 0–0.5)
INR PPP: 1.1 (ref 0.8–1.2)
LYMPHOCYTES # BLD AUTO: 0.7 K/UL (ref 1–4.8)
LYMPHOCYTES NFR BLD: 21.3 % (ref 18–48)
MAGNESIUM SERPL-MCNC: 2 MG/DL (ref 1.6–2.6)
MCH RBC QN AUTO: 30.9 PG (ref 27–31)
MCHC RBC AUTO-ENTMCNC: 34.4 G/DL (ref 32–36)
MCV RBC AUTO: 90 FL (ref 82–98)
MONOCYTES # BLD AUTO: 0.5 K/UL (ref 0.3–1)
MONOCYTES NFR BLD: 15.9 % (ref 4–15)
NEUTROPHILS # BLD AUTO: 1.6 K/UL (ref 1.8–7.7)
NEUTROPHILS NFR BLD: 49.4 % (ref 38–73)
NRBC BLD-RTO: 0 /100 WBC
PLATELET # BLD AUTO: 65 K/UL (ref 150–450)
PMV BLD AUTO: 12.3 FL (ref 9.2–12.9)
POTASSIUM SERPL-SCNC: 3.4 MMOL/L (ref 3.5–5.1)
PROT SERPL-MCNC: 6.3 G/DL (ref 6–8.4)
PROTHROMBIN TIME: 11.2 SEC (ref 9–12.5)
RBC # BLD AUTO: 3.43 M/UL (ref 4–5.4)
SODIUM SERPL-SCNC: 127 MMOL/L (ref 136–145)
TACROLIMUS BLD-MCNC: 6.6 NG/ML (ref 5–15)
WBC # BLD AUTO: 3.28 K/UL (ref 3.9–12.7)

## 2023-03-05 PROCEDURE — 63600175 PHARM REV CODE 636 W HCPCS: Performed by: STUDENT IN AN ORGANIZED HEALTH CARE EDUCATION/TRAINING PROGRAM

## 2023-03-05 PROCEDURE — 99222 PR INITIAL HOSPITAL CARE,LEVL II: ICD-10-PCS | Mod: ,,, | Performed by: STUDENT IN AN ORGANIZED HEALTH CARE EDUCATION/TRAINING PROGRAM

## 2023-03-05 PROCEDURE — 80197 ASSAY OF TACROLIMUS: CPT | Performed by: STUDENT IN AN ORGANIZED HEALTH CARE EDUCATION/TRAINING PROGRAM

## 2023-03-05 PROCEDURE — 99222 1ST HOSP IP/OBS MODERATE 55: CPT | Mod: ,,, | Performed by: STUDENT IN AN ORGANIZED HEALTH CARE EDUCATION/TRAINING PROGRAM

## 2023-03-05 PROCEDURE — 83735 ASSAY OF MAGNESIUM: CPT | Performed by: STUDENT IN AN ORGANIZED HEALTH CARE EDUCATION/TRAINING PROGRAM

## 2023-03-05 PROCEDURE — 25000003 PHARM REV CODE 250: Performed by: STUDENT IN AN ORGANIZED HEALTH CARE EDUCATION/TRAINING PROGRAM

## 2023-03-05 PROCEDURE — 85610 PROTHROMBIN TIME: CPT | Performed by: STUDENT IN AN ORGANIZED HEALTH CARE EDUCATION/TRAINING PROGRAM

## 2023-03-05 PROCEDURE — 80053 COMPREHEN METABOLIC PANEL: CPT | Performed by: STUDENT IN AN ORGANIZED HEALTH CARE EDUCATION/TRAINING PROGRAM

## 2023-03-05 PROCEDURE — 25000003 PHARM REV CODE 250: Performed by: HOSPITALIST

## 2023-03-05 PROCEDURE — 36415 COLL VENOUS BLD VENIPUNCTURE: CPT | Performed by: STUDENT IN AN ORGANIZED HEALTH CARE EDUCATION/TRAINING PROGRAM

## 2023-03-05 PROCEDURE — 85025 COMPLETE CBC W/AUTO DIFF WBC: CPT | Performed by: STUDENT IN AN ORGANIZED HEALTH CARE EDUCATION/TRAINING PROGRAM

## 2023-03-05 PROCEDURE — 20600001 HC STEP DOWN PRIVATE ROOM

## 2023-03-05 RX ORDER — DEMECLOCYCLINE HYDROCHLORIDE 150 MG/1
150 TABLET, FILM COATED ORAL EVERY 12 HOURS
Status: DISCONTINUED | OUTPATIENT
Start: 2023-03-05 | End: 2023-03-05

## 2023-03-05 RX ORDER — HYDROMORPHONE HYDROCHLORIDE 1 MG/ML
0.5 INJECTION, SOLUTION INTRAMUSCULAR; INTRAVENOUS; SUBCUTANEOUS ONCE
Status: DISCONTINUED | OUTPATIENT
Start: 2023-03-05 | End: 2023-03-06

## 2023-03-05 RX ORDER — DEMECLOCYCLINE HYDROCHLORIDE 150 MG/1
150 TABLET, FILM COATED ORAL EVERY 12 HOURS
Qty: 60 TABLET | Refills: 0 | Status: SHIPPED | OUTPATIENT
Start: 2023-03-05 | End: 2023-03-06 | Stop reason: HOSPADM

## 2023-03-05 RX ORDER — POTASSIUM CHLORIDE 20 MEQ/1
40 TABLET, EXTENDED RELEASE ORAL ONCE
Status: DISCONTINUED | OUTPATIENT
Start: 2023-03-05 | End: 2023-03-05

## 2023-03-05 RX ORDER — DEMECLOCYCLINE HYDROCHLORIDE 150 MG/1
150 TABLET, FILM COATED ORAL EVERY 12 HOURS
Status: DISCONTINUED | OUTPATIENT
Start: 2023-03-05 | End: 2023-03-06

## 2023-03-05 RX ADMIN — LACTULOSE 30 G: 20 SOLUTION ORAL at 08:03

## 2023-03-05 RX ADMIN — BUTALBITAL, ACETAMINOPHEN, AND CAFFEINE 1 TABLET: 325; 50; 40 TABLET ORAL at 03:03

## 2023-03-05 RX ADMIN — TACROLIMUS 0.5 MG: 0.5 CAPSULE ORAL at 08:03

## 2023-03-05 RX ADMIN — DICYCLOMINE HYDROCHLORIDE 10 MG: 10 CAPSULE ORAL at 03:03

## 2023-03-05 RX ADMIN — RIFAXIMIN 550 MG: 550 TABLET ORAL at 08:03

## 2023-03-05 RX ADMIN — POTASSIUM BICARBONATE 50 MEQ: 978 TABLET, EFFERVESCENT ORAL at 10:03

## 2023-03-05 RX ADMIN — HYDROMORPHONE HYDROCHLORIDE 0.5 MG: 1 INJECTION, SOLUTION INTRAMUSCULAR; INTRAVENOUS; SUBCUTANEOUS at 03:03

## 2023-03-05 RX ADMIN — PRAVASTATIN SODIUM 10 MG: 10 TABLET ORAL at 08:03

## 2023-03-05 RX ADMIN — HYDROMORPHONE HYDROCHLORIDE 0.5 MG: 1 INJECTION, SOLUTION INTRAMUSCULAR; INTRAVENOUS; SUBCUTANEOUS at 08:03

## 2023-03-05 RX ADMIN — LEVOTHYROXINE SODIUM 75 MCG: 75 TABLET ORAL at 05:03

## 2023-03-05 RX ADMIN — VENLAFAXINE HYDROCHLORIDE 150 MG: 150 CAPSULE, EXTENDED RELEASE ORAL at 08:03

## 2023-03-05 RX ADMIN — CLONAZEPAM 0.5 MG: 0.5 TABLET ORAL at 08:03

## 2023-03-05 RX ADMIN — OXYCODONE HYDROCHLORIDE 10 MG: 10 TABLET ORAL at 12:03

## 2023-03-05 RX ADMIN — ONDANSETRON 4 MG: 2 INJECTION INTRAMUSCULAR; INTRAVENOUS at 03:03

## 2023-03-05 RX ADMIN — OXYCODONE HYDROCHLORIDE 10 MG: 10 TABLET ORAL at 07:03

## 2023-03-05 RX ADMIN — Medication 6 MG: at 08:03

## 2023-03-05 RX ADMIN — PROMETHAZINE HYDROCHLORIDE 25 MG: 25 TABLET ORAL at 07:03

## 2023-03-05 RX ADMIN — OXYCODONE HYDROCHLORIDE 10 MG: 10 TABLET ORAL at 08:03

## 2023-03-05 RX ADMIN — PANTOPRAZOLE SODIUM 40 MG: 40 TABLET, DELAYED RELEASE ORAL at 08:03

## 2023-03-05 RX ADMIN — ONDANSETRON 4 MG: 2 INJECTION INTRAMUSCULAR; INTRAVENOUS at 02:03

## 2023-03-05 RX ADMIN — LACTULOSE 30 G: 20 SOLUTION ORAL at 03:03

## 2023-03-05 RX ADMIN — HYDROMORPHONE HYDROCHLORIDE 0.5 MG: 1 INJECTION, SOLUTION INTRAMUSCULAR; INTRAVENOUS; SUBCUTANEOUS at 02:03

## 2023-03-05 RX ADMIN — TACROLIMUS 0.5 MG: 0.5 CAPSULE ORAL at 06:03

## 2023-03-05 NOTE — TREATMENT PLAN
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 She did not have enough ascites for sampling to rule out SBP. She is status post up-titration of Lactulose with improvement in mentation. Neurological consulted with regards to ataxia and status post CT head and MRI showing cerebellar infarcts; recommendation for outpatient vestibular therapy.     Interval History  Vital signs stable on room air. Labs notable for improving JACQUE (Cr 1.53, stable hyperbilirubinemia (2), and stable transaminitis (AST 65). Tacrolimus level 6.6.     Plan  - Lactulose TID and Rifaximin BID; titrate dose frequency of Lactulose to 3 bowel movement daily.   - Tacrolimus 0.5 mg BID.   - Hold home Lasix with JACQUE.   - Minimize use of sedating agents that may precipitate encephalopathy (e.g. opioids and benzo's).   - CMP, INR, and Tacrolimus level daily.     Thank you for involving us in the care of Elda Hernandez. Please call with any additional concerns or questions.        Jonny Feldman MD, PGY-V  Gastroenterology Fellow  [facility]

## 2023-03-05 NOTE — ASSESSMENT & PLAN NOTE
Patient with acute kidney injury likely due to IVVD/dehydration JACQUE is currently improving. Labs reviewed- Renal function/electrolytes with Estimated Creatinine Clearance: 38.1 mL/min (based on SCr of 1.3 mg/dL). according to latest data. Monitor urine output and serial BMP and adjust therapy as needed. Avoid nephrotoxins and renally dose meds for GFR listed above.   Recent contrast with studies in addition to poor p.o. intake and volume loss with lactulose  Improved s/p IVF

## 2023-03-05 NOTE — PLAN OF CARE
Patient AAOX4, RA, vitals WNL. Patient state no further needs or concerns att. Will continue to monitor.

## 2023-03-05 NOTE — SUBJECTIVE & OBJECTIVE
Interval History:   Patient seen and examined at bedside.  Awake and alert.  Per nursing documentation, patient able to rest well last night.  However, patient reports she did not rest well last night; ongoing nausea and headache, however controlled with medications.  Further, complaining of abdominal pain morning:  Starts in left upper quadrant and travels to right side.  She is oriented to person, place, time.  Less pressured speech today.  Patient updated regarding care plan.    Review of Systems   Constitutional:  Negative for chills, fatigue and fever.   HENT:  Negative for congestion, postnasal drip, rhinorrhea, sneezing and sore throat.    Eyes:  Negative for photophobia and visual disturbance.   Respiratory:  Negative for cough, choking, chest tightness and shortness of breath.    Cardiovascular:  Negative for chest pain, palpitations and leg swelling.   Gastrointestinal:  Negative for abdominal distention, abdominal pain, constipation, diarrhea, nausea and vomiting.   Endocrine: Negative for polyphagia and polyuria.   Genitourinary:  Negative for decreased urine volume, difficulty urinating, dysuria and urgency.   Musculoskeletal:  Positive for gait problem. Negative for arthralgias and myalgias.   Skin:  Negative for rash and wound.   Neurological:  Positive for dizziness, tremors, light-headedness and headaches. Negative for seizures and syncope.   Psychiatric/Behavioral:  Negative for agitation and decreased concentration.    Objective:     Vital Signs (Most Recent):  Temp: 97.5 °F (36.4 °C) (03/05/23 0412)  Pulse: 64 (03/05/23 0726)  Resp: 18 (03/05/23 0826)  BP: 134/64 (03/05/23 0726)  SpO2: (!) 94 % (03/05/23 0726)   Vital Signs (24h Range):  Temp:  [97.5 °F (36.4 °C)-98.7 °F (37.1 °C)] 97.5 °F (36.4 °C)  Pulse:  [64-73] 64  Resp:  [12-19] 18  SpO2:  [94 %-98 %] 94 %  BP: (109-152)/(55-66) 134/64     Weight: 52.6 kg (115 lb 15.4 oz)  Body mass index is 23.42 kg/m².  No intake or output data in the 24  hours ending 03/05/23 1034     Physical Exam  Vitals and nursing note reviewed.   Constitutional:       General: She is not in acute distress.     Appearance: She is ill-appearing (chronically). She is not toxic-appearing or diaphoretic.   HENT:      Head: Normocephalic and atraumatic.      Right Ear: External ear normal.      Left Ear: External ear normal.      Mouth/Throat:      Mouth: Mucous membranes are moist.   Eyes:      General: No scleral icterus.        Right eye: No discharge.         Left eye: No discharge.   Cardiovascular:      Rate and Rhythm: Normal rate and regular rhythm.      Pulses: Normal pulses.      Heart sounds: Normal heart sounds. No murmur heard.    No friction rub. No gallop.   Pulmonary:      Effort: Pulmonary effort is normal. No respiratory distress.      Breath sounds: No stridor. No wheezing, rhonchi or rales.   Chest:      Chest wall: No tenderness.   Abdominal:      General: Abdomen is flat. Bowel sounds are normal. There is distension.      Palpations: Abdomen is soft. There is no mass.      Tenderness: There is abdominal tenderness. There is no guarding or rebound.      Hernia: No hernia is present.   Musculoskeletal:      Cervical back: Normal range of motion and neck supple.      Right lower leg: No edema.      Left lower leg: No edema.   Skin:     General: Skin is warm and dry.   Neurological:      Mental Status: She is alert and oriented to person, place, and time.      Sensory: No sensory deficit.      Motor: No weakness.      Comments: + mild asterixis, oriented but pressured speech   Psychiatric:         Behavior: Behavior normal.       Significant Labs: All pertinent labs within the past 24 hours have been reviewed.    Recent Results (from the past 24 hour(s))   Sodium, Random Urine    Collection Time: 03/04/23 12:25 PM   Result Value Ref Range    Sodium, Urine <10 (L) 20 - 250 mmol/L   Osmolality, Urine    Collection Time: 03/04/23 12:25 PM   Result Value Ref Range     Osmolality, Urine 237 50 - 1200 mOsm/kg   Ammonia    Collection Time: 03/04/23  3:08 PM   Result Value Ref Range    Ammonia 87 (H) 10 - 50 umol/L   Basic metabolic panel    Collection Time: 03/04/23  3:08 PM   Result Value Ref Range    Sodium 130 (L) 136 - 145 mmol/L    Potassium 3.6 3.5 - 5.1 mmol/L    Chloride 98 95 - 110 mmol/L    CO2 24 23 - 29 mmol/L    Glucose 82 70 - 110 mg/dL    BUN 22 (H) 6 - 20 mg/dL    Creatinine 1.5 (H) 0.5 - 1.4 mg/dL    Calcium 8.6 (L) 8.7 - 10.5 mg/dL    Anion Gap 8 8 - 16 mmol/L    eGFR 41.2 (A) >60 mL/min/1.73 m^2   Protime-INR    Collection Time: 03/05/23  6:08 AM   Result Value Ref Range    Prothrombin Time 11.2 9.0 - 12.5 sec    INR 1.1 0.8 - 1.2   Tacrolimus level    Collection Time: 03/05/23  6:08 AM   Result Value Ref Range    Tacrolimus Lvl 6.6 5.0 - 15.0 ng/mL   CBC auto differential    Collection Time: 03/05/23  6:08 AM   Result Value Ref Range    WBC 3.28 (L) 3.90 - 12.70 K/uL    RBC 3.43 (L) 4.00 - 5.40 M/uL    Hemoglobin 10.6 (L) 12.0 - 16.0 g/dL    Hematocrit 30.8 (L) 37.0 - 48.5 %    MCV 90 82 - 98 fL    MCH 30.9 27.0 - 31.0 pg    MCHC 34.4 32.0 - 36.0 g/dL    RDW 14.8 (H) 11.5 - 14.5 %    Platelets 65 (L) 150 - 450 K/uL    MPV 12.3 9.2 - 12.9 fL    Immature Granulocytes 0.0 0.0 - 0.5 %    Gran # (ANC) 1.6 (L) 1.8 - 7.7 K/uL    Immature Grans (Abs) 0.00 0.00 - 0.04 K/uL    Lymph # 0.7 (L) 1.0 - 4.8 K/uL    Mono # 0.5 0.3 - 1.0 K/uL    Eos # 0.4 0.0 - 0.5 K/uL    Baso # 0.04 0.00 - 0.20 K/uL    nRBC 0 0 /100 WBC    Gran % 49.4 38.0 - 73.0 %    Lymph % 21.3 18.0 - 48.0 %    Mono % 15.9 (H) 4.0 - 15.0 %    Eosinophil % 12.2 (H) 0.0 - 8.0 %    Basophil % 1.2 0.0 - 1.9 %    Differential Method Automated    Comprehensive metabolic panel    Collection Time: 03/05/23  6:08 AM   Result Value Ref Range    Sodium 127 (L) 136 - 145 mmol/L    Potassium 3.4 (L) 3.5 - 5.1 mmol/L    Chloride 98 95 - 110 mmol/L    CO2 23 23 - 29 mmol/L    Glucose 78 70 - 110 mg/dL    BUN 20 6 - 20  mg/dL    Creatinine 1.3 0.5 - 1.4 mg/dL    Calcium 8.4 (L) 8.7 - 10.5 mg/dL    Total Protein 6.3 6.0 - 8.4 g/dL    Albumin 2.7 (L) 3.5 - 5.2 g/dL    Total Bilirubin 2.0 (H) 0.1 - 1.0 mg/dL    Alkaline Phosphatase 550 (H) 55 - 135 U/L    AST 65 (H) 10 - 40 U/L    ALT 41 10 - 44 U/L    Anion Gap 6 (L) 8 - 16 mmol/L    eGFR 48.9 (A) >60 mL/min/1.73 m^2   Magnesium    Collection Time: 03/05/23  6:08 AM   Result Value Ref Range    Magnesium 2.0 1.6 - 2.6 mg/dL         Significant Imaging: I have reviewed all pertinent imaging results/findings within the past 24 hours.    Imaging Results              US Doppler Liver Transplant Post (xpd) (Final result)  Result time 03/01/23 02:08:28   Procedure changed from US Liver with Doppler (xpd)     Final result by Clark Morales MD (03/01/23 02:08:28)                   Impression:      No evidence of portal venous thrombosis as questioned.  Satisfactory Doppler evaluation of the liver transplant.    Cirrhotic morphology of the hepatic allograft.  Questionable 2.8 cm hypoechoic area at the periphery of the left hepatic lobe which may reflect changes related to nodular contour versus possible lesion.  Consider further evaluation with multiphase CT or MRI if clinically warranted.    Electronically signed by resident: Lazaro Pacheco  Date:    03/01/2023  Time:    01:36    Electronically signed by: Clark Morales MD  Date:    03/01/2023  Time:    02:08               Narrative:    EXAMINATION:  US DOPPLER LIVER TRANSPLANT POST (XPD)    CLINICAL HISTORY:  R/O portal vein thrombosis; R/O Ascites;    TECHNIQUE:  Limited abdominal ultrasound of the transplant liver with Doppler evaluation.  Color and spectral Doppler were performed.    COMPARISON:  Liver transplant Doppler 01/03/2023, 03/25/2022, CT abdomen pelvis 03/24/2022    FINDINGS:  Patient is status post orthotopic liver transplant in 2002.  Liver allograft is demonstrates a nodular contour with heterogeneous echotexture  suggestive of cirrhosis.  2.3 x 2.5 x 2.8 cm questionable slightly hypoattenuating area in the left hepatic lobe demonstrating vascularity, which may reflect nodular changes related to cirrhosis over a left hepatic lobe lesion.  No fluid collections or significant upper abdominal ascites.    The common duct is not significantly distended for post cholecystectomy status, maximally measuring 8 mm and similar to prior noting it measured up to 7 mm on 03/25/2022.  No dilated intrahepatic radicles are seen.    Color flow and spectral waveform analysis was performed.  The main portal vein, right portal vein, left portal vein, middle hepatic vein, right hepatic vein, left hepatic vein, and IVC are patent with proper directional flow.  Peak velocity of the main portal vein is 30 cm/sec.    Anastomosis site of the main hepatic artery demonstrates a peak systolic velocity 131 cm/sec.  (Previously 137)    Main hepatic artery demonstrates resistive index 0.77 with normal waveform.  (Previously 0.79)    Left hepatic artery shows resistive index 0.75 with normal waveform.  (Previously 0.81)    Anterior branch of the right hepatic artery demonstrates resistive index 0.75 with normal waveform.  (Previously 0.81)    Posterior branch of the right hepatic artery demonstrates resistive index 0.72 with normal waveform.  (Previously 0.79)                                       CT Soft Tissue Neck With Contrast (Final result)  Result time 02/28/23 23:47:24      Final result by Clark Morales MD (02/28/23 23:47:24)                   Impression:      No drainable abscess identified in the neck soft tissues.  Further evaluation/follow-up including direct visualization as clinically warranted.    Patulous appearance of the esophagus with fluid and air in the esophageal lumen.    Additional findings discussed in the body of the report.      Electronically signed by: Clark Morales MD  Date:    02/28/2023  Time:    23:47                Narrative:    EXAMINATION:  CT SOFT TISSUE NECK WITH CONTRAST    CLINICAL HISTORY:  Neck mass, nonpulsatile;Neck abscess, deep tissue; No additional clinical history or physical exam provided in the chart at the time of dictation.    TECHNIQUE:  CT images obtained throughout the region of the neck after the administration of 75 mL Omnipaque 350 intravenous contrast. Axial, sagittal and coronal reconstructions were performed.    COMPARISON:  CT chest, 09/04/2020.    FINDINGS:  Exam quality is limited by suboptimal positioning and motion artifact.    No abnormal fluid collection identified in the neck soft tissues.  No convincing enhancing mass identified in the neck soft tissues.  Recommend correlation with physical exam and/or direct visualization as clinically warranted.    Epiglottis is unremarkable.  The parotid and submandibular glands are symmetric without significant adjacent fat stranding.  Subcentimeter hypodensity in the left lobe of the thyroid gland, too small for dedicated follow-up.    Major vessels of the neck appear patent.  Atherosclerosis and narrowing of the bilateral carotid arteries.  Limited intracranial evaluation appears negative for acute finding.  The visualized paranasal sinuses are essentially clear.  Postoperative changes in the right mastoid air cells.  Left mastoid air cells are essentially clear.  Postoperative changes of suboccipital craniectomy.    There is a questionable partially visualized nodule versus atelectasis or scarring in the right upper lobe (axial series 3, image 394).  This finding is likely similar when compared with prior CT dated 09/04/2020.  Lung apices are otherwise unremarkable.    Patulous appearance of the visualized esophagus with air and fluid in the mid esophageal lumen.    No aggressive osseous lesion identified.  Minimal vertebral body height loss at T3, likely chronic.  Partially visualized postoperative changes of prior median sternotomy.                                        X-Ray Chest AP Portable (Final result)  Result time 02/28/23 19:36:17      Final result by Samuel Grant MD (02/28/23 19:36:17)                   Impression:      1. No acute cardiopulmonary process.      Electronically signed by: Samuel Grant MD  Date:    02/28/2023  Time:    19:36               Narrative:    EXAMINATION:  XR CHEST AP PORTABLE    CLINICAL HISTORY:  ams;    TECHNIQUE:  Single frontal view of the chest was performed.    COMPARISON:  03/24/2022    FINDINGS:  The cardiomediastinal silhouette is not enlarged, magnified by technique.  There is no pleural effusion.  The trachea is midline.  The lungs are symmetrically expanded bilaterally without evidence of acute parenchymal process. No large focal consolidation seen.  There is no pneumothorax.  The osseous structures are remarkable for degenerative changes.  Patient is rotated..                                       CT Head Without Contrast (Final result)  Result time 02/28/23 18:54:58      Final result by González Kwok MD (02/28/23 18:54:58)                   Impression:      No evidence of acute intracranial pathology.  Additional evaluation, as clinically warranted.    Stable multifocal parenchymal calcifications, nonspecific and potentially related to remote infectious or metabolic etiology.    Electronically signed by resident: Jakob Monteiro  Date:    02/28/2023  Time:    18:36    Electronically signed by: González Kwok MD  Date:    02/28/2023  Time:    18:54               Narrative:    EXAMINATION:  CT HEAD WITHOUT CONTRAST    CLINICAL HISTORY:  Headache, chronic, new features or increased frequency;    TECHNIQUE:  Low dose axial CT images obtained throughout the head without the use of intravenous contrast.  Axial, sagittal and coronal reconstructions were performed.    COMPARISON:  CT 09/25/2020, MRI 04/24/2015    FINDINGS:  Intracranial compartment:    The ventricular configuration appears stable from prior exam  without evidence of hydrocephalus.    Brain parenchyma appears unchanged with multifocal parenchymal calcifications stable in distribution compared to the prior exam.  No parenchymal mass, hemorrhage, edema or major vascular distribution infarct. No extra-axial blood or fluid collections.    Skull/extracranial contents (limited evaluation):    Remote postoperative changes of suboccipital craniectomy.  Prior right mastoidectomy.  Left mastoid air cells are clear.  Visualized paranasal sinuses are clear.

## 2023-03-05 NOTE — PLAN OF CARE
Patient was able to rest well last night. VS were WNL and there was no SS of distressed noted.Call light within reach, bed in low position and bed alarm set. Will continue to monitor and pass on care to oncoming nurse.

## 2023-03-05 NOTE — ASSESSMENT & PLAN NOTE
Hyponatremia    Patient used to take demeclocycline at home. Patient's sodium level within normal limits upon admission. Will continue to trend.  Na 127 s/p IVF; d/c IVF, trend and urine studies

## 2023-03-05 NOTE — PROGRESS NOTES
"[facility] - Telemetry [facility] Medicine  Progress Note    Patient Name: Elda Hernandez  MRN: [id number]  Patient Class: IP- Inpatient   Admission Date: 2/28/2023  Length of Stay: 5 days  Attending Physician: Lizeth Worley MD  Primary Care Provider: David Lorenzo MD        Subjective:     Principal Problem:Decompensated liver disease        HPI:  Elda Hernandez is a 52-year-old woman a past medical history of primary hypertension, cirrhosis of transplanted liver, Von Gierke disease s/p transplant, and hypothyroidism, who presents to the emergency department with altered mental status. The patient is accompanied by her  who assists in providing the history. Per report, the patient has been confused and repetitive with her speech per her 's report for the past week. The patient follows closely with the Liver Transplant clinic and recently had her ammonia checked given the confusion and it was noted that it was elevated. She was recently started on lactulose by her provider and states that she has been having at least one bowel movement per day with the exception of yesterday when the patient said she had multiple episodes of loose, runny stool. She also notes increasing abdominal distention and generalized abdominal pain. The patient says that the last time she was hospitalized, a paracentesis was attempted but was not successful given there was not a pocket of fluid that was able to be drained. The patient denies any fevers, chills or rigors but states that she often does not become febrile because of her immunosuppression. Additionally, the patient complains of a left-sided neck mass that has been present for "some time." She says that her primary care physician is aware of the mass and that he ordered an ultrasound to further investigate the mass but says that it has not yet been completed. She also complains of a severe headache that is generalized. She denies any chest pain, shortness of " "breath or dyspnea on exertion.    In the emergency department, the patient was noted to be have stable vital signs upon arrival. Her labs were significant for a chronic neutropenia and thrombocytopenia with platelet count of 54. Potassium was noted to be 3.3. Magnesium was 1.5. The patient's liver enzymes elevated with AST 77 ALT 52 and alkaline phosphatase 582. Bilirubin at 2.3. Ammonia at 56. A CT head was ordered which revealed no acute abnormality. The patient was admitted to Hospital Medicine for further management.      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 Asterixis noted on examination. Mildly elevated LFTs. Started on lactulose and rifaximin. Per IR, During ultrasound evaluation, no ascites identified for safe paracentesis.  Liver ultrasound with Doppler without evidence of portal venous thrombosis and without significant ascites but notable for 2.8 cm hypo attenuation.  MRI brain W WO with subtle findings concerning for hepatic encephalopathy though other toxic or metabolic derangement or encephalitis could have similar appearance; no acute process.  Also notable for Mild chronic small vessel ischemic change with multiple small remote bilateral cerebellar infarcts.  Discussed with hepatology, and concern that encephalopathy not solely due to hepatic encephalopathy.  Neurology consulted. "MRI with objective findings of cerebellar infarcts likely accounting for physical presentation. " CT abdomen pelvis with contrast completed. "Mild interval increase in size of hypodense hepatic nodule.  The questionable lesion identified on ultrasound not confidently seen on this single phase exam though the nodular contour of the liver appears similar prior CT.  Triple phase liver study can " "be repeated at no additional charge. "Discussed with hepatology.  MRI abdomen ordered to further evaluate. " No suspicious enhancing hepatic lesion.  The lesion described on prior CT is less conspicuous on MRI without concerning enhancement."      Interval History:   Patient seen and examined at bedside.  Awake and alert.  Per nursing documentation, patient able to rest well last night.  However, patient reports she did not rest well last night; ongoing nausea and headache, however controlled with medications.  Further, complaining of abdominal pain morning:  Starts in left upper quadrant and travels to right side.  She is oriented to person, place, time.  Less pressured speech today.  Patient updated regarding care plan.    Review of Systems   Constitutional:  Negative for chills, fatigue and fever.   HENT:  Negative for congestion, postnasal drip, rhinorrhea, sneezing and sore throat.    Eyes:  Negative for photophobia and visual disturbance.   Respiratory:  Negative for cough, choking, chest tightness and shortness of breath.    Cardiovascular:  Negative for chest pain, palpitations and leg swelling.   Gastrointestinal:  Negative for abdominal distention, abdominal pain, constipation, diarrhea, nausea and vomiting.   Endocrine: Negative for polyphagia and polyuria.   Genitourinary:  Negative for decreased urine volume, difficulty urinating, dysuria and urgency.   Musculoskeletal:  Positive for gait problem. Negative for arthralgias and myalgias.   Skin:  Negative for rash and wound.   Neurological:  Positive for dizziness, tremors, light-headedness and headaches. Negative for seizures and syncope.   Psychiatric/Behavioral:  Negative for agitation and decreased concentration.    Objective:     Vital Signs (Most Recent):  Temp: 97.5 °F (36.4 °C) (03/05/23 0412)  Pulse: 64 (03/05/23 0726)  Resp: 18 (03/05/23 0826)  BP: 134/64 (03/05/23 0726)  SpO2: (!) 94 % (03/05/23 0726)   Vital Signs (24h Range):  Temp:  [97.5 °F " (36.4 °C)-98.7 °F (37.1 °C)] 97.5 °F (36.4 °C)  Pulse:  [64-73] 64  Resp:  [12-19] 18  SpO2:  [94 %-98 %] 94 %  BP: (109-152)/(55-66) 134/64     Weight: 52.6 kg (115 lb 15.4 oz)  Body mass index is 23.42 kg/m².  No intake or output data in the 24 hours ending 03/05/23 1034     Physical Exam  Vitals and nursing note reviewed.   Constitutional:       General: She is not in acute distress.     Appearance: She is ill-appearing (chronically). She is not toxic-appearing or diaphoretic.   HENT:      Head: Normocephalic and atraumatic.      Right Ear: External ear normal.      Left Ear: External ear normal.      Mouth/Throat:      Mouth: Mucous membranes are moist.   Eyes:      General: No scleral icterus.        Right eye: No discharge.         Left eye: No discharge.   Cardiovascular:      Rate and Rhythm: Normal rate and regular rhythm.      Pulses: Normal pulses.      Heart sounds: Normal heart sounds. No murmur heard.    No friction rub. No gallop.   Pulmonary:      Effort: Pulmonary effort is normal. No respiratory distress.      Breath sounds: No stridor. No wheezing, rhonchi or rales.   Chest:      Chest wall: No tenderness.   Abdominal:      General: Abdomen is flat. Bowel sounds are normal. There is distension.      Palpations: Abdomen is soft. There is no mass.      Tenderness: There is abdominal tenderness. There is no guarding or rebound.      Hernia: No hernia is present.   Musculoskeletal:      Cervical back: Normal range of motion and neck supple.      Right lower leg: No edema.      Left lower leg: No edema.   Skin:     General: Skin is warm and dry.   Neurological:      Mental Status: She is alert and oriented to person, place, and time.      Sensory: No sensory deficit.      Motor: No weakness.      Comments: + mild asterixis, oriented but pressured speech   Psychiatric:         Behavior: Behavior normal.       Significant Labs: All pertinent labs within the past 24 hours have been reviewed.    Recent  Results (from the past 24 hour(s))   Sodium, Random Urine    Collection Time: 03/04/23 12:25 PM   Result Value Ref Range    Sodium, Urine <10 (L) 20 - 250 mmol/L   Osmolality, Urine    Collection Time: 03/04/23 12:25 PM   Result Value Ref Range    Osmolality, Urine 237 50 - 1200 mOsm/kg   Ammonia    Collection Time: 03/04/23  3:08 PM   Result Value Ref Range    Ammonia 87 (H) 10 - 50 umol/L   Basic metabolic panel    Collection Time: 03/04/23  3:08 PM   Result Value Ref Range    Sodium 130 (L) 136 - 145 mmol/L    Potassium 3.6 3.5 - 5.1 mmol/L    Chloride 98 95 - 110 mmol/L    CO2 24 23 - 29 mmol/L    Glucose 82 70 - 110 mg/dL    BUN 22 (H) 6 - 20 mg/dL    Creatinine 1.5 (H) 0.5 - 1.4 mg/dL    Calcium 8.6 (L) 8.7 - 10.5 mg/dL    Anion Gap 8 8 - 16 mmol/L    eGFR 41.2 (A) >60 mL/min/1.73 m^2   Protime-INR    Collection Time: 03/05/23  6:08 AM   Result Value Ref Range    Prothrombin Time 11.2 9.0 - 12.5 sec    INR 1.1 0.8 - 1.2   Tacrolimus level    Collection Time: 03/05/23  6:08 AM   Result Value Ref Range    Tacrolimus Lvl 6.6 5.0 - 15.0 ng/mL   CBC auto differential    Collection Time: 03/05/23  6:08 AM   Result Value Ref Range    WBC 3.28 (L) 3.90 - 12.70 K/uL    RBC 3.43 (L) 4.00 - 5.40 M/uL    Hemoglobin 10.6 (L) 12.0 - 16.0 g/dL    Hematocrit 30.8 (L) 37.0 - 48.5 %    MCV 90 82 - 98 fL    MCH 30.9 27.0 - 31.0 pg    MCHC 34.4 32.0 - 36.0 g/dL    RDW 14.8 (H) 11.5 - 14.5 %    Platelets 65 (L) 150 - 450 K/uL    MPV 12.3 9.2 - 12.9 fL    Immature Granulocytes 0.0 0.0 - 0.5 %    Gran # (ANC) 1.6 (L) 1.8 - 7.7 K/uL    Immature Grans (Abs) 0.00 0.00 - 0.04 K/uL    Lymph # 0.7 (L) 1.0 - 4.8 K/uL    Mono # 0.5 0.3 - 1.0 K/uL    Eos # 0.4 0.0 - 0.5 K/uL    Baso # 0.04 0.00 - 0.20 K/uL    nRBC 0 0 /100 WBC    Gran % 49.4 38.0 - 73.0 %    Lymph % 21.3 18.0 - 48.0 %    Mono % 15.9 (H) 4.0 - 15.0 %    Eosinophil % 12.2 (H) 0.0 - 8.0 %    Basophil % 1.2 0.0 - 1.9 %    Differential Method Automated    Comprehensive metabolic  panel    Collection Time: 03/05/23  6:08 AM   Result Value Ref Range    Sodium 127 (L) 136 - 145 mmol/L    Potassium 3.4 (L) 3.5 - 5.1 mmol/L    Chloride 98 95 - 110 mmol/L    CO2 23 23 - 29 mmol/L    Glucose 78 70 - 110 mg/dL    BUN 20 6 - 20 mg/dL    Creatinine 1.3 0.5 - 1.4 mg/dL    Calcium 8.4 (L) 8.7 - 10.5 mg/dL    Total Protein 6.3 6.0 - 8.4 g/dL    Albumin 2.7 (L) 3.5 - 5.2 g/dL    Total Bilirubin 2.0 (H) 0.1 - 1.0 mg/dL    Alkaline Phosphatase 550 (H) 55 - 135 U/L    AST 65 (H) 10 - 40 U/L    ALT 41 10 - 44 U/L    Anion Gap 6 (L) 8 - 16 mmol/L    eGFR 48.9 (A) >60 mL/min/1.73 m^2   Magnesium    Collection Time: 03/05/23  6:08 AM   Result Value Ref Range    Magnesium 2.0 1.6 - 2.6 mg/dL         Significant Imaging: I have reviewed all pertinent imaging results/findings within the past 24 hours.    Imaging Results              US Doppler Liver Transplant Post (xpd) (Final result)  Result time 03/01/23 02:08:28   Procedure changed from US Liver with Doppler (xpd)     Final result by Clark Morales MD (03/01/23 02:08:28)                   Impression:      No evidence of portal venous thrombosis as questioned.  Satisfactory Doppler evaluation of the liver transplant.    Cirrhotic morphology of the hepatic allograft.  Questionable 2.8 cm hypoechoic area at the periphery of the left hepatic lobe which may reflect changes related to nodular contour versus possible lesion.  Consider further evaluation with multiphase CT or MRI if clinically warranted.    Electronically signed by resident: Lazaro Pacheco  Date:    03/01/2023  Time:    01:36    Electronically signed by: Clark Morales MD  Date:    03/01/2023  Time:    02:08               Narrative:    EXAMINATION:  US DOPPLER LIVER TRANSPLANT POST (XPD)    CLINICAL HISTORY:  R/O portal vein thrombosis; R/O Ascites;    TECHNIQUE:  Limited abdominal ultrasound of the transplant liver with Doppler evaluation.  Color and spectral Doppler were  performed.    COMPARISON:  Liver transplant Doppler 01/03/2023, 03/25/2022, CT abdomen pelvis 03/24/2022    FINDINGS:  Patient is status post orthotopic liver transplant in 2002.  Liver allograft is demonstrates a nodular contour with heterogeneous echotexture suggestive of cirrhosis.  2.3 x 2.5 x 2.8 cm questionable slightly hypoattenuating area in the left hepatic lobe demonstrating vascularity, which may reflect nodular changes related to cirrhosis over a left hepatic lobe lesion.  No fluid collections or significant upper abdominal ascites.    The common duct is not significantly distended for post cholecystectomy status, maximally measuring 8 mm and similar to prior noting it measured up to 7 mm on 03/25/2022.  No dilated intrahepatic radicles are seen.    Color flow and spectral waveform analysis was performed.  The main portal vein, right portal vein, left portal vein, middle hepatic vein, right hepatic vein, left hepatic vein, and IVC are patent with proper directional flow.  Peak velocity of the main portal vein is 30 cm/sec.    Anastomosis site of the main hepatic artery demonstrates a peak systolic velocity 131 cm/sec.  (Previously 137)    Main hepatic artery demonstrates resistive index 0.77 with normal waveform.  (Previously 0.79)    Left hepatic artery shows resistive index 0.75 with normal waveform.  (Previously 0.81)    Anterior branch of the right hepatic artery demonstrates resistive index 0.75 with normal waveform.  (Previously 0.81)    Posterior branch of the right hepatic artery demonstrates resistive index 0.72 with normal waveform.  (Previously 0.79)                                       CT Soft Tissue Neck With Contrast (Final result)  Result time 02/28/23 23:47:24      Final result by Clark Morales MD (02/28/23 23:47:24)                   Impression:      No drainable abscess identified in the neck soft tissues.  Further evaluation/follow-up including direct visualization as clinically  warranted.    Patulous appearance of the esophagus with fluid and air in the esophageal lumen.    Additional findings discussed in the body of the report.      Electronically signed by: Clark Morales MD  Date:    02/28/2023  Time:    23:47               Narrative:    EXAMINATION:  CT SOFT TISSUE NECK WITH CONTRAST    CLINICAL HISTORY:  Neck mass, nonpulsatile;Neck abscess, deep tissue; No additional clinical history or physical exam provided in the chart at the time of dictation.    TECHNIQUE:  CT images obtained throughout the region of the neck after the administration of 75 mL Omnipaque 350 intravenous contrast. Axial, sagittal and coronal reconstructions were performed.    COMPARISON:  CT chest, 09/04/2020.    FINDINGS:  Exam quality is limited by suboptimal positioning and motion artifact.    No abnormal fluid collection identified in the neck soft tissues.  No convincing enhancing mass identified in the neck soft tissues.  Recommend correlation with physical exam and/or direct visualization as clinically warranted.    Epiglottis is unremarkable.  The parotid and submandibular glands are symmetric without significant adjacent fat stranding.  Subcentimeter hypodensity in the left lobe of the thyroid gland, too small for dedicated follow-up.    Major vessels of the neck appear patent.  Atherosclerosis and narrowing of the bilateral carotid arteries.  Limited intracranial evaluation appears negative for acute finding.  The visualized paranasal sinuses are essentially clear.  Postoperative changes in the right mastoid air cells.  Left mastoid air cells are essentially clear.  Postoperative changes of suboccipital craniectomy.    There is a questionable partially visualized nodule versus atelectasis or scarring in the right upper lobe (axial series 3, image 394).  This finding is likely similar when compared with prior CT dated 09/04/2020.  Lung apices are otherwise unremarkable.    Patulous appearance of the  visualized esophagus with air and fluid in the mid esophageal lumen.    No aggressive osseous lesion identified.  Minimal vertebral body height loss at T3, likely chronic.  Partially visualized postoperative changes of prior median sternotomy.                                       X-Ray Chest AP Portable (Final result)  Result time 02/28/23 19:36:17      Final result by Samuel Grant MD (02/28/23 19:36:17)                   Impression:      1. No acute cardiopulmonary process.      Electronically signed by: Samuel Grant MD  Date:    02/28/2023  Time:    19:36               Narrative:    EXAMINATION:  XR CHEST AP PORTABLE    CLINICAL HISTORY:  ams;    TECHNIQUE:  Single frontal view of the chest was performed.    COMPARISON:  03/24/2022    FINDINGS:  The cardiomediastinal silhouette is not enlarged, magnified by technique.  There is no pleural effusion.  The trachea is midline.  The lungs are symmetrically expanded bilaterally without evidence of acute parenchymal process. No large focal consolidation seen.  There is no pneumothorax.  The osseous structures are remarkable for degenerative changes.  Patient is rotated..                                       CT Head Without Contrast (Final result)  Result time 02/28/23 18:54:58      Final result by González Kwok MD (02/28/23 18:54:58)                   Impression:      No evidence of acute intracranial pathology.  Additional evaluation, as clinically warranted.    Stable multifocal parenchymal calcifications, nonspecific and potentially related to remote infectious or metabolic etiology.    Electronically signed by resident: Jakob Monteiro  Date:    02/28/2023  Time:    18:36    Electronically signed by: González Kwok MD  Date:    02/28/2023  Time:    18:54               Narrative:    EXAMINATION:  CT HEAD WITHOUT CONTRAST    CLINICAL HISTORY:  Headache, chronic, new features or increased frequency;    TECHNIQUE:  Low dose axial CT images obtained throughout  "the head without the use of intravenous contrast.  Axial, sagittal and coronal reconstructions were performed.    COMPARISON:  CT 09/25/2020, MRI 04/24/2015    FINDINGS:  Intracranial compartment:    The ventricular configuration appears stable from prior exam without evidence of hydrocephalus.    Brain parenchyma appears unchanged with multifocal parenchymal calcifications stable in distribution compared to the prior exam.  No parenchymal mass, hemorrhage, edema or major vascular distribution infarct. No extra-axial blood or fluid collections.    Skull/extracranial contents (limited evaluation):    Remote postoperative changes of suboccipital craniectomy.  Prior right mastoidectomy.  Left mastoid air cells are clear.  Visualized paranasal sinuses are clear.                                          Assessment/Plan:      * Decompensated liver disease  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  Encephalopathy    Patient presenting with confusion and disorientation with notable flapping tremor concerning for hepatic encephalopathy. 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 Patient also with abdominal distention and generalized abdominal pain concerning for underlying ascites. She denies any recent GI bleeding. Follows with Liver Transplant clinic as an outpatient.    - Hepatology consulted, appreciate recommendations  -During IR ultrasound evaluation, no ascites identified for safe paracentesis  -U/S liver w/ Questionable 2.8 cm hypoechoic area at the periphery of the left hepatic lobe; CT triple phase to further evaluate inconclusive; discussed with hepatology, MRI abdomen " No suspicious enhancing hepatic lesion.  The lesion described on prior CT is less conspicuous on MRI without concerning enhancement."  - lactulose 30 g TID  - rifaximin 500 mg BID    MELD-Na score: 22 at 3/5/2023  6:08 AM  MELD score: 13 at 3/5/2023  6:08 AM  Calculated from:  Serum " Creatinine: 1.3 mg/dL at 3/5/2023  6:08 AM  Serum Sodium: 127 mmol/L at 3/5/2023  6:08 AM  Total Bilirubin: 2.0 mg/dL at 3/5/2023  6:08 AM  INR(ratio): 1.1 at 3/5/2023  6:08 AM  Age: 52 years      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  Tacrolimus 0.5 mg BID. Trend tacrolimus levels daily.  Appreciate hepatology input.      Ataxia  Per neurology, MRI with objective findings of cerebellar infarcts likely accounting for physical presentation.   Physical exam findings consistent with and correlates to old cerebellar infarct. Pt would benefit from outpatient physical/vestibular therapy.      Depression  Patient has persistent depression which is unknown and is currently controlled. Will Continue anti-depressant medications. We will not consult psychiatry at this time. Patient does not display psychosis at this time. Continue to monitor closely and adjust plan of care as needed. venlafaxine 150 mg daily.        JACQUE (acute kidney injury)  Patient with acute kidney injury likely due to IVVD/dehydration JACQUE is currently improving. Labs reviewed- Renal function/electrolytes with Estimated Creatinine Clearance: 38.1 mL/min (based on SCr of 1.3 mg/dL). according to latest data. Monitor urine output and serial BMP and adjust therapy as needed. Avoid nephrotoxins and renally dose meds for GFR listed above.   Recent contrast with studies in addition to poor p.o. intake and volume loss with lactulose  Improved s/p IVF      Hypothyroidism  Resume home levothyroxine.      Anxiety  Home medication includes Klonopin 0.5 mg b.i.d. p.r.n.  Potentially contributing to encephalopathy, will discuss with patient regarding reduction and eventual cessation      Depression, recurrent  Resume home           SIADH (syndrome of inappropriate ADH production)  Hyponatremia    Patient used to take demeclocycline at home. Patient's sodium level within normal limits upon admission. Will continue to trend.  Na 127 s/p IVF; d/c IVF, trend  and urine studies          VTE Risk Mitigation (From admission, onward)         Ordered     Place sequential compression device  Until discontinued         03/01/23 0151     IP VTE LOW RISK PATIENT  Once         02/28/23 1958                Discharge Planning   MARILEE: 3/6/2023     Code Status: Full Code   Is the patient medically ready for discharge?: No    Reason for patient still in hospital (select all that apply): Patient trending condition, Imaging and Pending disposition  Discharge Plan A: Home with family, Home Health   Discharge Delays: None known at this time              Lizeth Worley MD  Department of Hospital Medicine   [facility] - Telemetry Stepdown

## 2023-03-05 NOTE — ASSESSMENT & PLAN NOTE
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 Patient also with abdominal distention and generalized abdominal pain concerning for underlying ascites. She denies any recent GI bleeding. Follows with Liver Transplant clinic as an outpatient.    - Hepatology consulted, appreciate recommendations  -During IR ultrasound evaluation, no ascites identified for safe paracentesis  -U/S liver w/ Questionable 2.8 cm hypoechoic area at the periphery of the left hepatic lobe; CT triple phase to further evaluate inconclusive; discussed with hepatology, MRI abdomen " No suspicious enhancing hepatic lesion.  The lesion described on prior CT is less conspicuous on MRI without concerning enhancement."  - lactulose 30 g TID  - rifaximin 500 mg BID    MELD-Na score: 22 at 3/5/2023  6:08 AM  MELD score: 13 at 3/5/2023  6:08 AM  Calculated from:  Serum Creatinine: 1.3 mg/dL at 3/5/2023  6:08 AM  Serum Sodium: 127 mmol/L at 3/5/2023  6:08 AM  Total Bilirubin: 2.0 mg/dL at 3/5/2023  6:08 AM  INR(ratio): 1.1 at 3/5/2023  6:08 AM  Age: 52 years    "

## 2023-03-06 VITALS
OXYGEN SATURATION: 98 % | HEART RATE: 74 BPM | DIASTOLIC BLOOD PRESSURE: 60 MMHG | SYSTOLIC BLOOD PRESSURE: 129 MMHG | RESPIRATION RATE: 18 BRPM | BODY MASS INDEX: 23.37 KG/M2 | WEIGHT: 115.94 LBS | HEIGHT: 59 IN | TEMPERATURE: 99 F

## 2023-03-06 LAB
ALBUMIN SERPL BCP-MCNC: 2.6 G/DL (ref 3.5–5.2)
ALBUMIN SERPL BCP-MCNC: 2.6 G/DL (ref 3.5–5.2)
ALP SERPL-CCNC: 512 U/L (ref 55–135)
ALT SERPL W/O P-5'-P-CCNC: 38 U/L (ref 10–44)
ANION GAP SERPL CALC-SCNC: 6 MMOL/L (ref 8–16)
ANION GAP SERPL CALC-SCNC: 6 MMOL/L (ref 8–16)
AST SERPL-CCNC: 59 U/L (ref 10–40)
BASOPHILS # BLD AUTO: 0.05 K/UL (ref 0–0.2)
BASOPHILS NFR BLD: 1.4 % (ref 0–1.9)
BILIRUB SERPL-MCNC: 1.7 MG/DL (ref 0.1–1)
BUN SERPL-MCNC: 17 MG/DL (ref 6–20)
BUN SERPL-MCNC: 18 MG/DL (ref 6–20)
CALCIUM SERPL-MCNC: 8.7 MG/DL (ref 8.7–10.5)
CALCIUM SERPL-MCNC: 8.7 MG/DL (ref 8.7–10.5)
CHLORIDE SERPL-SCNC: 102 MMOL/L (ref 95–110)
CHLORIDE SERPL-SCNC: 103 MMOL/L (ref 95–110)
CO2 SERPL-SCNC: 22 MMOL/L (ref 23–29)
CO2 SERPL-SCNC: 22 MMOL/L (ref 23–29)
CREAT SERPL-MCNC: 1.3 MG/DL (ref 0.5–1.4)
CREAT SERPL-MCNC: 1.3 MG/DL (ref 0.5–1.4)
DIFFERENTIAL METHOD: ABNORMAL
EOSINOPHIL # BLD AUTO: 0.4 K/UL (ref 0–0.5)
EOSINOPHIL NFR BLD: 10.1 % (ref 0–8)
ERYTHROCYTE [DISTWIDTH] IN BLOOD BY AUTOMATED COUNT: 15.1 % (ref 11.5–14.5)
EST. GFR  (NO RACE VARIABLE): 48.9 ML/MIN/1.73 M^2
EST. GFR  (NO RACE VARIABLE): 48.9 ML/MIN/1.73 M^2
GLUCOSE SERPL-MCNC: 101 MG/DL (ref 70–110)
GLUCOSE SERPL-MCNC: 73 MG/DL (ref 70–110)
HCT VFR BLD AUTO: 29 % (ref 37–48.5)
HGB BLD-MCNC: 9.5 G/DL (ref 12–16)
IMM GRANULOCYTES # BLD AUTO: 0.01 K/UL (ref 0–0.04)
IMM GRANULOCYTES NFR BLD AUTO: 0.3 % (ref 0–0.5)
INR PPP: 1.1 (ref 0.8–1.2)
LYMPHOCYTES # BLD AUTO: 0.6 K/UL (ref 1–4.8)
LYMPHOCYTES NFR BLD: 17 % (ref 18–48)
MAGNESIUM SERPL-MCNC: 2.2 MG/DL (ref 1.6–2.6)
MCH RBC QN AUTO: 30.4 PG (ref 27–31)
MCHC RBC AUTO-ENTMCNC: 32.8 G/DL (ref 32–36)
MCV RBC AUTO: 93 FL (ref 82–98)
MONOCYTES # BLD AUTO: 0.7 K/UL (ref 0.3–1)
MONOCYTES NFR BLD: 19.8 % (ref 4–15)
NEUTROPHILS # BLD AUTO: 1.8 K/UL (ref 1.8–7.7)
NEUTROPHILS NFR BLD: 51.4 % (ref 38–73)
NRBC BLD-RTO: 0 /100 WBC
OSMOLALITY SERPL: 280 MOSM/KG (ref 275–295)
OSMOLALITY UR: 132 MOSM/KG (ref 50–1200)
PHOSPHATE SERPL-MCNC: 2.9 MG/DL (ref 2.7–4.5)
PLATELET # BLD AUTO: 70 K/UL (ref 150–450)
PMV BLD AUTO: 12.6 FL (ref 9.2–12.9)
POTASSIUM SERPL-SCNC: 4.1 MMOL/L (ref 3.5–5.1)
POTASSIUM SERPL-SCNC: 4.5 MMOL/L (ref 3.5–5.1)
PROT SERPL-MCNC: 6 G/DL (ref 6–8.4)
PROTHROMBIN TIME: 11.4 SEC (ref 9–12.5)
RBC # BLD AUTO: 3.12 M/UL (ref 4–5.4)
SODIUM SERPL-SCNC: 130 MMOL/L (ref 136–145)
SODIUM SERPL-SCNC: 131 MMOL/L (ref 136–145)
SODIUM UR-SCNC: 12 MMOL/L (ref 20–250)
TACROLIMUS BLD-MCNC: 6.3 NG/ML (ref 5–15)
WBC # BLD AUTO: 3.58 K/UL (ref 3.9–12.7)

## 2023-03-06 PROCEDURE — 63600175 PHARM REV CODE 636 W HCPCS: Performed by: STUDENT IN AN ORGANIZED HEALTH CARE EDUCATION/TRAINING PROGRAM

## 2023-03-06 PROCEDURE — 84300 ASSAY OF URINE SODIUM: CPT | Performed by: STUDENT IN AN ORGANIZED HEALTH CARE EDUCATION/TRAINING PROGRAM

## 2023-03-06 PROCEDURE — 99232 PR SUBSEQUENT HOSPITAL CARE,LEVL II: ICD-10-PCS | Mod: ,,, | Performed by: STUDENT IN AN ORGANIZED HEALTH CARE EDUCATION/TRAINING PROGRAM

## 2023-03-06 PROCEDURE — 84100 ASSAY OF PHOSPHORUS: CPT | Performed by: STUDENT IN AN ORGANIZED HEALTH CARE EDUCATION/TRAINING PROGRAM

## 2023-03-06 PROCEDURE — 25000003 PHARM REV CODE 250: Performed by: STUDENT IN AN ORGANIZED HEALTH CARE EDUCATION/TRAINING PROGRAM

## 2023-03-06 PROCEDURE — 85610 PROTHROMBIN TIME: CPT | Performed by: STUDENT IN AN ORGANIZED HEALTH CARE EDUCATION/TRAINING PROGRAM

## 2023-03-06 PROCEDURE — 80197 ASSAY OF TACROLIMUS: CPT | Performed by: STUDENT IN AN ORGANIZED HEALTH CARE EDUCATION/TRAINING PROGRAM

## 2023-03-06 PROCEDURE — 36415 COLL VENOUS BLD VENIPUNCTURE: CPT | Performed by: STUDENT IN AN ORGANIZED HEALTH CARE EDUCATION/TRAINING PROGRAM

## 2023-03-06 PROCEDURE — 85025 COMPLETE CBC W/AUTO DIFF WBC: CPT | Performed by: STUDENT IN AN ORGANIZED HEALTH CARE EDUCATION/TRAINING PROGRAM

## 2023-03-06 PROCEDURE — 80053 COMPREHEN METABOLIC PANEL: CPT | Performed by: STUDENT IN AN ORGANIZED HEALTH CARE EDUCATION/TRAINING PROGRAM

## 2023-03-06 PROCEDURE — 83930 ASSAY OF BLOOD OSMOLALITY: CPT | Performed by: STUDENT IN AN ORGANIZED HEALTH CARE EDUCATION/TRAINING PROGRAM

## 2023-03-06 PROCEDURE — 97530 THERAPEUTIC ACTIVITIES: CPT | Mod: CQ

## 2023-03-06 PROCEDURE — 83935 ASSAY OF URINE OSMOLALITY: CPT | Performed by: STUDENT IN AN ORGANIZED HEALTH CARE EDUCATION/TRAINING PROGRAM

## 2023-03-06 PROCEDURE — 94761 N-INVAS EAR/PLS OXIMETRY MLT: CPT

## 2023-03-06 PROCEDURE — 99232 SBSQ HOSP IP/OBS MODERATE 35: CPT | Mod: ,,, | Performed by: STUDENT IN AN ORGANIZED HEALTH CARE EDUCATION/TRAINING PROGRAM

## 2023-03-06 PROCEDURE — 83735 ASSAY OF MAGNESIUM: CPT | Performed by: STUDENT IN AN ORGANIZED HEALTH CARE EDUCATION/TRAINING PROGRAM

## 2023-03-06 RX ORDER — MAGNESIUM SULFATE HEPTAHYDRATE 40 MG/ML
2 INJECTION, SOLUTION INTRAVENOUS 2 TIMES DAILY
Status: DISCONTINUED | OUTPATIENT
Start: 2023-03-06 | End: 2023-03-06 | Stop reason: HOSPADM

## 2023-03-06 RX ORDER — PREDNISONE 20 MG/1
40 TABLET ORAL DAILY
Status: DISCONTINUED | OUTPATIENT
Start: 2023-03-09 | End: 2023-03-06 | Stop reason: HOSPADM

## 2023-03-06 RX ORDER — PREDNISONE 10 MG/1
TABLET ORAL
Qty: 41 TABLET | Refills: 0 | Status: SHIPPED | OUTPATIENT
Start: 2023-03-07 | End: 2023-03-06 | Stop reason: SDUPTHER

## 2023-03-06 RX ORDER — CIPROFLOXACIN 500 MG/1
500 TABLET ORAL EVERY 12 HOURS
Qty: 14 TABLET | Refills: 0 | Status: SHIPPED | OUTPATIENT
Start: 2023-03-06 | End: 2023-03-13

## 2023-03-06 RX ORDER — PREDNISONE 5 MG/1
5 TABLET ORAL DAILY
Status: DISCONTINUED | OUTPATIENT
Start: 2023-03-18 | End: 2023-03-06 | Stop reason: HOSPADM

## 2023-03-06 RX ORDER — FUROSEMIDE 10 MG/ML
40 INJECTION INTRAMUSCULAR; INTRAVENOUS ONCE
Status: DISCONTINUED | OUTPATIENT
Start: 2023-03-06 | End: 2023-03-06

## 2023-03-06 RX ORDER — PREDNISONE 20 MG/1
20 TABLET ORAL DAILY
Status: DISCONTINUED | OUTPATIENT
Start: 2023-03-12 | End: 2023-03-06 | Stop reason: HOSPADM

## 2023-03-06 RX ORDER — LIDOCAINE 50 MG/G
2 PATCH TOPICAL
Status: DISCONTINUED | OUTPATIENT
Start: 2023-03-06 | End: 2023-03-06 | Stop reason: HOSPADM

## 2023-03-06 RX ORDER — LACTULOSE 10 G/15ML
20 SOLUTION ORAL; RECTAL 3 TIMES DAILY
Qty: 2700 ML | Refills: 0 | Status: SHIPPED | OUTPATIENT
Start: 2023-03-06 | End: 2023-04-05

## 2023-03-06 RX ORDER — DICLOFENAC SODIUM 10 MG/G
2 GEL TOPICAL 4 TIMES DAILY
Status: DISCONTINUED | OUTPATIENT
Start: 2023-03-06 | End: 2023-03-06 | Stop reason: HOSPADM

## 2023-03-06 RX ORDER — FUROSEMIDE 10 MG/ML
40 INJECTION INTRAMUSCULAR; INTRAVENOUS ONCE
Status: COMPLETED | OUTPATIENT
Start: 2023-03-06 | End: 2023-03-06

## 2023-03-06 RX ORDER — CIPROFLOXACIN 500 MG/1
500 TABLET ORAL EVERY 12 HOURS
Qty: 14 TABLET | Refills: 0 | Status: SHIPPED | OUTPATIENT
Start: 2023-03-06 | End: 2023-03-06 | Stop reason: SDUPTHER

## 2023-03-06 RX ORDER — FUROSEMIDE 20 MG/1
40 TABLET ORAL DAILY
Qty: 90 TABLET | Refills: 3
Start: 2023-03-06 | End: 2023-05-22 | Stop reason: DRUGHIGH

## 2023-03-06 RX ORDER — PREDNISONE 10 MG/1
10 TABLET ORAL DAILY
Status: DISCONTINUED | OUTPATIENT
Start: 2023-03-15 | End: 2023-03-06 | Stop reason: HOSPADM

## 2023-03-06 RX ORDER — PROMETHAZINE HYDROCHLORIDE 25 MG/1
25 TABLET ORAL ONCE
Status: COMPLETED | OUTPATIENT
Start: 2023-03-06 | End: 2023-03-06

## 2023-03-06 RX ORDER — PREDNISONE 10 MG/1
TABLET ORAL
Qty: 41 TABLET | Refills: 0 | Status: SHIPPED | OUTPATIENT
Start: 2023-03-07 | End: 2023-03-22

## 2023-03-06 RX ORDER — SODIUM CHLORIDE 9 MG/ML
INJECTION, SOLUTION INTRAVENOUS CONTINUOUS
Status: DISCONTINUED | OUTPATIENT
Start: 2023-03-06 | End: 2023-03-06

## 2023-03-06 RX ADMIN — PANTOPRAZOLE SODIUM 40 MG: 40 TABLET, DELAYED RELEASE ORAL at 08:03

## 2023-03-06 RX ADMIN — HYDROMORPHONE HYDROCHLORIDE 0.5 MG: 1 INJECTION, SOLUTION INTRAMUSCULAR; INTRAVENOUS; SUBCUTANEOUS at 12:03

## 2023-03-06 RX ADMIN — PREDNISONE 60 MG: 50 TABLET ORAL at 12:03

## 2023-03-06 RX ADMIN — LEVOTHYROXINE SODIUM 75 MCG: 75 TABLET ORAL at 05:03

## 2023-03-06 RX ADMIN — RIFAXIMIN 550 MG: 550 TABLET ORAL at 08:03

## 2023-03-06 RX ADMIN — VENLAFAXINE HYDROCHLORIDE 150 MG: 150 CAPSULE, EXTENDED RELEASE ORAL at 08:03

## 2023-03-06 RX ADMIN — FUROSEMIDE 40 MG: 10 INJECTION, SOLUTION INTRAMUSCULAR; INTRAVENOUS at 07:03

## 2023-03-06 RX ADMIN — OXYCODONE HYDROCHLORIDE 10 MG: 10 TABLET ORAL at 11:03

## 2023-03-06 RX ADMIN — LACTULOSE 30 G: 20 SOLUTION ORAL at 08:03

## 2023-03-06 RX ADMIN — CEFTRIAXONE 2 G: 2 INJECTION, POWDER, FOR SOLUTION INTRAMUSCULAR; INTRAVENOUS at 11:03

## 2023-03-06 RX ADMIN — TACROLIMUS 0.5 MG: 0.5 CAPSULE ORAL at 08:03

## 2023-03-06 RX ADMIN — HYDROMORPHONE HYDROCHLORIDE 0.5 MG: 1 INJECTION, SOLUTION INTRAMUSCULAR; INTRAVENOUS; SUBCUTANEOUS at 07:03

## 2023-03-06 RX ADMIN — OXYCODONE HYDROCHLORIDE 10 MG: 10 TABLET ORAL at 05:03

## 2023-03-06 RX ADMIN — MAGNESIUM SULFATE 2 G: 2 INJECTION INTRAVENOUS at 12:03

## 2023-03-06 RX ADMIN — PROMETHAZINE HYDROCHLORIDE 25 MG: 25 TABLET ORAL at 12:03

## 2023-03-06 NOTE — SUBJECTIVE & OBJECTIVE
Interval History:   Patient seen and examined at bedside.  Awake and alert. She is oriented to person, place, time.  Complaining of ongoing headache today, concerns regarding sodium, and abdominal pain.  Requesting IV pain medication for headache; discussed again concern for rebound headache.  Discussed initiation of steroids to address intake with Neurology and hepatology.  Patient updated regarding care plan.    Review of Systems   Constitutional:  Negative for chills, fatigue and fever.   HENT:  Negative for congestion, postnasal drip, rhinorrhea, sneezing and sore throat.    Eyes:  Negative for photophobia and visual disturbance.   Respiratory:  Negative for cough, choking, chest tightness and shortness of breath.    Cardiovascular:  Negative for chest pain, palpitations and leg swelling.   Gastrointestinal:  Positive for abdominal pain. Negative for abdominal distention, constipation, diarrhea, nausea and vomiting.   Endocrine: Negative for polyphagia and polyuria.   Genitourinary:  Negative for decreased urine volume, difficulty urinating, dysuria and urgency.   Musculoskeletal:  Positive for gait problem. Negative for arthralgias and myalgias.   Skin:  Negative for rash and wound.   Neurological:  Positive for dizziness, tremors, light-headedness and headaches. Negative for seizures and syncope.   Psychiatric/Behavioral:  Negative for agitation and decreased concentration.    Objective:     Vital Signs (Most Recent):  Temp: 98.5 °F (36.9 °C) (03/06/23 1149)  Pulse: 74 (03/06/23 1149)  Resp: 16 (03/06/23 1149)  BP: 129/60 (03/06/23 1149)  SpO2: 100 % (03/06/23 1149)   Vital Signs (24h Range):  Temp:  [97.4 °F (36.3 °C)-98.7 °F (37.1 °C)] 98.5 °F (36.9 °C)  Pulse:  [62-76] 74  Resp:  [12-19] 16  SpO2:  [94 %-100 %] 100 %  BP: (122-148)/(55-68) 129/60     Weight: 52.6 kg (115 lb 15.4 oz)  Body mass index is 23.42 kg/m².    Intake/Output Summary (Last 24 hours) at 3/6/2023 1306  Last data filed at 3/6/2023  1229  Gross per 24 hour   Intake 793.46 ml   Output 500 ml   Net 293.46 ml        Physical Exam  Vitals and nursing note reviewed.   Constitutional:       General: She is not in acute distress.     Appearance: She is ill-appearing (chronically). She is not toxic-appearing or diaphoretic.   HENT:      Head: Normocephalic and atraumatic.      Right Ear: External ear normal.      Left Ear: External ear normal.      Mouth/Throat:      Mouth: Mucous membranes are moist.   Eyes:      General: No scleral icterus.        Right eye: No discharge.         Left eye: No discharge.   Cardiovascular:      Rate and Rhythm: Normal rate and regular rhythm.      Pulses: Normal pulses.      Heart sounds: Normal heart sounds. No murmur heard.    No friction rub. No gallop.   Pulmonary:      Effort: Pulmonary effort is normal. No respiratory distress.      Breath sounds: No stridor. No wheezing, rhonchi or rales.   Chest:      Chest wall: No tenderness.   Abdominal:      General: Abdomen is flat. Bowel sounds are normal. There is distension.      Palpations: Abdomen is soft. There is no mass.      Tenderness: There is abdominal tenderness. There is no guarding or rebound.      Hernia: No hernia is present.   Musculoskeletal:      Cervical back: Normal range of motion and neck supple.      Right lower leg: No edema.      Left lower leg: No edema.   Skin:     General: Skin is warm and dry.   Neurological:      Mental Status: She is alert and oriented to person, place, and time.      Sensory: No sensory deficit.      Motor: No weakness.      Comments: + mild asterixis, oriented but pressured speech   Psychiatric:         Behavior: Behavior normal.       Significant Labs: All pertinent labs within the past 24 hours have been reviewed.    Recent Results (from the past 24 hour(s))   Protime-INR    Collection Time: 03/06/23  3:53 AM   Result Value Ref Range    Prothrombin Time 11.4 9.0 - 12.5 sec    INR 1.1 0.8 - 1.2   Tacrolimus level     Collection Time: 03/06/23  3:53 AM   Result Value Ref Range    Tacrolimus Lvl 6.3 5.0 - 15.0 ng/mL   CBC auto differential    Collection Time: 03/06/23  3:53 AM   Result Value Ref Range    WBC 3.58 (L) 3.90 - 12.70 K/uL    RBC 3.12 (L) 4.00 - 5.40 M/uL    Hemoglobin 9.5 (L) 12.0 - 16.0 g/dL    Hematocrit 29.0 (L) 37.0 - 48.5 %    MCV 93 82 - 98 fL    MCH 30.4 27.0 - 31.0 pg    MCHC 32.8 32.0 - 36.0 g/dL    RDW 15.1 (H) 11.5 - 14.5 %    Platelets 70 (L) 150 - 450 K/uL    MPV 12.6 9.2 - 12.9 fL    Immature Granulocytes 0.3 0.0 - 0.5 %    Gran # (ANC) 1.8 1.8 - 7.7 K/uL    Immature Grans (Abs) 0.01 0.00 - 0.04 K/uL    Lymph # 0.6 (L) 1.0 - 4.8 K/uL    Mono # 0.7 0.3 - 1.0 K/uL    Eos # 0.4 0.0 - 0.5 K/uL    Baso # 0.05 0.00 - 0.20 K/uL    nRBC 0 0 /100 WBC    Gran % 51.4 38.0 - 73.0 %    Lymph % 17.0 (L) 18.0 - 48.0 %    Mono % 19.8 (H) 4.0 - 15.0 %    Eosinophil % 10.1 (H) 0.0 - 8.0 %    Basophil % 1.4 0.0 - 1.9 %    Differential Method Automated    Comprehensive metabolic panel    Collection Time: 03/06/23  3:53 AM   Result Value Ref Range    Sodium 131 (L) 136 - 145 mmol/L    Potassium 4.1 3.5 - 5.1 mmol/L    Chloride 103 95 - 110 mmol/L    CO2 22 (L) 23 - 29 mmol/L    Glucose 73 70 - 110 mg/dL    BUN 17 6 - 20 mg/dL    Creatinine 1.3 0.5 - 1.4 mg/dL    Calcium 8.7 8.7 - 10.5 mg/dL    Total Protein 6.0 6.0 - 8.4 g/dL    Albumin 2.6 (L) 3.5 - 5.2 g/dL    Total Bilirubin 1.7 (H) 0.1 - 1.0 mg/dL    Alkaline Phosphatase 512 (H) 55 - 135 U/L    AST 59 (H) 10 - 40 U/L    ALT 38 10 - 44 U/L    Anion Gap 6 (L) 8 - 16 mmol/L    eGFR 48.9 (A) >60 mL/min/1.73 m^2   Magnesium    Collection Time: 03/06/23  3:53 AM   Result Value Ref Range    Magnesium 2.2 1.6 - 2.6 mg/dL   Osmolality, Serum    Collection Time: 03/06/23  3:53 AM   Result Value Ref Range    Osmolality 280 275 - 295 mOsm/kg         Significant Imaging: I have reviewed all pertinent imaging results/findings within the past 24 hours.    Imaging Results               US Doppler Liver Transplant Post (xpd) (Final result)  Result time 03/01/23 02:08:28   Procedure changed from US Liver with Doppler (xpd)     Final result by Clark Morales MD (03/01/23 02:08:28)                   Impression:      No evidence of portal venous thrombosis as questioned.  Satisfactory Doppler evaluation of the liver transplant.    Cirrhotic morphology of the hepatic allograft.  Questionable 2.8 cm hypoechoic area at the periphery of the left hepatic lobe which may reflect changes related to nodular contour versus possible lesion.  Consider further evaluation with multiphase CT or MRI if clinically warranted.    Electronically signed by resident: Lazaro Pacheco  Date:    03/01/2023  Time:    01:36    Electronically signed by: Clark Morales MD  Date:    03/01/2023  Time:    02:08               Narrative:    EXAMINATION:  US DOPPLER LIVER TRANSPLANT POST (XPD)    CLINICAL HISTORY:  R/O portal vein thrombosis; R/O Ascites;    TECHNIQUE:  Limited abdominal ultrasound of the transplant liver with Doppler evaluation.  Color and spectral Doppler were performed.    COMPARISON:  Liver transplant Doppler 01/03/2023, 03/25/2022, CT abdomen pelvis 03/24/2022    FINDINGS:  Patient is status post orthotopic liver transplant in 2002.  Liver allograft is demonstrates a nodular contour with heterogeneous echotexture suggestive of cirrhosis.  2.3 x 2.5 x 2.8 cm questionable slightly hypoattenuating area in the left hepatic lobe demonstrating vascularity, which may reflect nodular changes related to cirrhosis over a left hepatic lobe lesion.  No fluid collections or significant upper abdominal ascites.    The common duct is not significantly distended for post cholecystectomy status, maximally measuring 8 mm and similar to prior noting it measured up to 7 mm on 03/25/2022.  No dilated intrahepatic radicles are seen.    Color flow and spectral waveform analysis was performed.  The main portal vein, right portal  vein, left portal vein, middle hepatic vein, right hepatic vein, left hepatic vein, and IVC are patent with proper directional flow.  Peak velocity of the main portal vein is 30 cm/sec.    Anastomosis site of the main hepatic artery demonstrates a peak systolic velocity 131 cm/sec.  (Previously 137)    Main hepatic artery demonstrates resistive index 0.77 with normal waveform.  (Previously 0.79)    Left hepatic artery shows resistive index 0.75 with normal waveform.  (Previously 0.81)    Anterior branch of the right hepatic artery demonstrates resistive index 0.75 with normal waveform.  (Previously 0.81)    Posterior branch of the right hepatic artery demonstrates resistive index 0.72 with normal waveform.  (Previously 0.79)                                       CT Soft Tissue Neck With Contrast (Final result)  Result time 02/28/23 23:47:24      Final result by Clark Morales MD (02/28/23 23:47:24)                   Impression:      No drainable abscess identified in the neck soft tissues.  Further evaluation/follow-up including direct visualization as clinically warranted.    Patulous appearance of the esophagus with fluid and air in the esophageal lumen.    Additional findings discussed in the body of the report.      Electronically signed by: Clark Morales MD  Date:    02/28/2023  Time:    23:47               Narrative:    EXAMINATION:  CT SOFT TISSUE NECK WITH CONTRAST    CLINICAL HISTORY:  Neck mass, nonpulsatile;Neck abscess, deep tissue; No additional clinical history or physical exam provided in the chart at the time of dictation.    TECHNIQUE:  CT images obtained throughout the region of the neck after the administration of 75 mL Omnipaque 350 intravenous contrast. Axial, sagittal and coronal reconstructions were performed.    COMPARISON:  CT chest, 09/04/2020.    FINDINGS:  Exam quality is limited by suboptimal positioning and motion artifact.    No abnormal fluid collection identified in the neck  soft tissues.  No convincing enhancing mass identified in the neck soft tissues.  Recommend correlation with physical exam and/or direct visualization as clinically warranted.    Epiglottis is unremarkable.  The parotid and submandibular glands are symmetric without significant adjacent fat stranding.  Subcentimeter hypodensity in the left lobe of the thyroid gland, too small for dedicated follow-up.    Major vessels of the neck appear patent.  Atherosclerosis and narrowing of the bilateral carotid arteries.  Limited intracranial evaluation appears negative for acute finding.  The visualized paranasal sinuses are essentially clear.  Postoperative changes in the right mastoid air cells.  Left mastoid air cells are essentially clear.  Postoperative changes of suboccipital craniectomy.    There is a questionable partially visualized nodule versus atelectasis or scarring in the right upper lobe (axial series 3, image 394).  This finding is likely similar when compared with prior CT dated 09/04/2020.  Lung apices are otherwise unremarkable.    Patulous appearance of the visualized esophagus with air and fluid in the mid esophageal lumen.    No aggressive osseous lesion identified.  Minimal vertebral body height loss at T3, likely chronic.  Partially visualized postoperative changes of prior median sternotomy.                                       X-Ray Chest AP Portable (Final result)  Result time 02/28/23 19:36:17      Final result by Samuel Grant MD (02/28/23 19:36:17)                   Impression:      1. No acute cardiopulmonary process.      Electronically signed by: Samuel Grant MD  Date:    02/28/2023  Time:    19:36               Narrative:    EXAMINATION:  XR CHEST AP PORTABLE    CLINICAL HISTORY:  ams;    TECHNIQUE:  Single frontal view of the chest was performed.    COMPARISON:  03/24/2022    FINDINGS:  The cardiomediastinal silhouette is not enlarged, magnified by technique.  There is no pleural  effusion.  The trachea is midline.  The lungs are symmetrically expanded bilaterally without evidence of acute parenchymal process. No large focal consolidation seen.  There is no pneumothorax.  The osseous structures are remarkable for degenerative changes.  Patient is rotated..                                       CT Head Without Contrast (Final result)  Result time 02/28/23 18:54:58      Final result by González Kwok MD (02/28/23 18:54:58)                   Impression:      No evidence of acute intracranial pathology.  Additional evaluation, as clinically warranted.    Stable multifocal parenchymal calcifications, nonspecific and potentially related to remote infectious or metabolic etiology.    Electronically signed by resident: Jakob Monteiro  Date:    02/28/2023  Time:    18:36    Electronically signed by: González Kwok MD  Date:    02/28/2023  Time:    18:54               Narrative:    EXAMINATION:  CT HEAD WITHOUT CONTRAST    CLINICAL HISTORY:  Headache, chronic, new features or increased frequency;    TECHNIQUE:  Low dose axial CT images obtained throughout the head without the use of intravenous contrast.  Axial, sagittal and coronal reconstructions were performed.    COMPARISON:  CT 09/25/2020, MRI 04/24/2015    FINDINGS:  Intracranial compartment:    The ventricular configuration appears stable from prior exam without evidence of hydrocephalus.    Brain parenchyma appears unchanged with multifocal parenchymal calcifications stable in distribution compared to the prior exam.  No parenchymal mass, hemorrhage, edema or major vascular distribution infarct. No extra-axial blood or fluid collections.    Skull/extracranial contents (limited evaluation):    Remote postoperative changes of suboccipital craniectomy.  Prior right mastoidectomy.  Left mastoid air cells are clear.  Visualized paranasal sinuses are clear.

## 2023-03-06 NOTE — ASSESSMENT & PLAN NOTE
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 Patient also with abdominal distention and generalized abdominal pain concerning for underlying ascites. She denies any recent GI bleeding. Follows with Liver Transplant clinic as an outpatient.    - Hepatology consulted, appreciate recommendations  -During IR ultrasound evaluation, no ascites identified for safe paracentesis  -Due to ongoing pain and evidence of ascites on imaging, rocephin initiated 3/6  -U/S liver w/ Questionable 2.8 cm hypoechoic area at the periphery of the left hepatic lobe; CT triple phase to further evaluate inconclusive; discussed with hepatology, MRI abdomen " No suspicious enhancing hepatic lesion.  The lesion described on prior CT is less conspicuous on MRI without concerning enhancement."  - lactulose 30 g TID  - rifaximin 500 mg BID    MELD-Na score: 18 at 3/6/2023  3:53 AM  MELD score: 12 at 3/6/2023  3:53 AM  Calculated from:  Serum Creatinine: 1.3 mg/dL at 3/6/2023  3:53 AM  Serum Sodium: 131 mmol/L at 3/6/2023  3:53 AM  Total Bilirubin: 1.7 mg/dL at 3/6/2023  3:53 AM  INR(ratio): 1.1 at 3/6/2023  3:53 AM  Age: 52 years    "

## 2023-03-06 NOTE — DISCHARGE SUMMARY
"[facility] - Telemetry [facility] Medicine  Discharge Summary      Patient Name: Elda Hernandez  MRN: [id number]  LUCY: [id number]  Patient Class: IP- Inpatient  Admission Date: 2/28/2023  Hospital Length of Stay: 6 days  Discharge Date and Time:  03/06/2023 5:23 PM  Attending Physician: Lizeth Worley MD   Discharging Provider: Lizeth Worley MD  Primary Care Provider: David Lorenzo MD  [facility] Medicine Team: [facility] MED L Lizeth Worley MD  Primary Care Team: [facility] [place] MED L    HPI:   Elda Hernandez is a 52-year-old woman a past medical history of primary hypertension, cirrhosis of transplanted liver, Von Gierke disease s/p transplant, and hypothyroidism, who presents to the emergency department with altered mental status. The patient is accompanied by her  who assists in providing the history. Per report, the patient has been confused and repetitive with her speech per her 's report for the past week. The patient follows closely with the Liver Transplant clinic and recently had her ammonia checked given the confusion and it was noted that it was elevated. She was recently started on lactulose by her provider and states that she has been having at least one bowel movement per day with the exception of yesterday when the patient said she had multiple episodes of loose, runny stool. She also notes increasing abdominal distention and generalized abdominal pain. The patient says that the last time she was hospitalized, a paracentesis was attempted but was not successful given there was not a pocket of fluid that was able to be drained. The patient denies any fevers, chills or rigors but states that she often does not become febrile because of her immunosuppression. Additionally, the patient complains of a left-sided neck mass that has been present for "some time." She says that her primary care physician is aware of the mass and that he ordered an ultrasound to further investigate the mass but says " "that it has not yet been completed. She also complains of a severe headache that is generalized. She denies any chest pain, shortness of breath or dyspnea on exertion.    In the emergency department, the patient was noted to be have stable vital signs upon arrival. Her labs were significant for a chronic neutropenia and thrombocytopenia with platelet count of 54. Potassium was noted to be 3.3. Magnesium was 1.5. The patient's liver enzymes elevated with AST 77 ALT 52 and alkaline phosphatase 582. Bilirubin at 2.3. Ammonia at 56. A CT head was ordered which revealed no acute abnormality. The patient was admitted to Hospital Medicine for further management.      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 Asterixis noted on examination. Mildly elevated LFTs. Started on lactulose and rifaximin with improvement. Per IR, During ultrasound evaluation, no ascites identified for safe paracentesis.  Liver ultrasound with Doppler without evidence of portal venous thrombosis and without significant ascites but notable for 2.8 cm hypo attenuation.  MRI brain W WO with subtle findings concerning for hepatic encephalopathy though other toxic or metabolic derangement or encephalitis could have similar appearance; no acute process.  Also notable for Mild chronic small vessel ischemic change with multiple small remote bilateral cerebellar infarcts.  Discussed with hepatology, and concern that encephalopathy not solely due to hepatic encephalopathy.  Neurology consulted. "MRI with objective findings of cerebellar infarcts likely accounting for physical presentation. " CT abdomen pelvis with contrast completed. "Mild interval increase in size of hypodense hepatic nodule.  The questionable " "lesion identified on ultrasound not confidently seen on this single phase exam though the nodular contour of the liver appears similar prior CT.  Triple phase liver study can be repeated at no additional charge. "Discussed with hepatology.  MRI abdomen ordered to further evaluate. " No suspicious enhancing hepatic lesion.  The lesion described on prior CT is less conspicuous on MRI without concerning enhancement." Patient with up trending creatinine.  Given IV fluids with improvement.  However, hyponatremia worsening following administration of IV fluids.  IV fluids held with up trend in sodium levels.  Patient with ongoing headache with nausea and vomiting.  Concern for medication overuse headache and potential opioid seeking behavior.  Discussed with Neurology as previously followed in case as given patient with migraine history.  After discussion with Neurology and hepatology, steroids initiated for management in addition to gentle IV fluids.  Due to worsening abdominal pain, CT abdomen pelvis obtained as ultrasound largely unremarkable apart from ascites findings.  IV Rocephin was also initiated as unable to sample fluid for SBP.  CT abdomen pelvis concerning for fluid retention.  IV Lasix ordered.  However, called to bedside by nursing on 03/06 as patient wanting to leave AMA.  Met patient at bedside and had extensive discussion regarding management plan.  Patient very unhappy regarding overall management of her altered mental status, hyponatremia, abdominal pain.  Patient further became inflammatory and upset when brought up in discussion concern for opioid seeking behavior.  Patient unhappy that abdominal pain will be attempted to be managed with IV Lasix.  Management plan thoroughly and repeatedly discussed with patient with reasoning; patient notes that she would like to leave AMA despite potential risks of foregoing further inpatient management.  She refuses to sign AMA form as concerned about insurance " paying issues.  Nursing and  staff notified of conversation and patient decision.  Will still place ambulatory referrals for follow-up and send any new medications to patient pharmacy.  Hope that patient continues to follow-up closely with outpatient providers.    Vitals:    03/06/23 0822 03/06/23 1017 03/06/23 1136 03/06/23 1149   BP: (!) 142/66   129/60   BP Location: Left arm      Patient Position: Lying      Pulse: 70 73  74   Resp: 19 18 14 16   Temp: 98.1 °F (36.7 °C)   98.5 °F (36.9 °C)   TempSrc: Oral      SpO2: 99% 97%  100%   Weight:       Height:              Goals of Care Treatment Preferences:  Code Status: Full Code      Consults:   Consults (From admission, onward)          Status Ordering Provider     Inpatient consult to Neurology  Once        Provider:  (Not yet assigned)    Completed MATIAS VASQUEZ     Inpatient consult to Hepatology  Once        Provider:  (Not yet assigned)    Completed JOHNSON SHANKS              Final Active Diagnoses:    Diagnosis Date Noted POA    PRINCIPAL PROBLEM:  Decompensated liver disease [K74.69] 11/09/2021 Yes    Encephalopathy [G93.40] 03/01/2023 Yes    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 [Z94.4] 10/12/2013 Not Applicable     Chronic    Hyponatremia [E87.1] 03/04/2023 Yes    Depression [F32.A] 03/02/2023 Yes    Ataxia [R27.0] 03/02/2023 Yes    HA (headache) [R51.9] 11/09/2021 Yes    JACQUE (acute kidney injury) [N17.9] 09/05/2020 No    SIADH (syndrome of inappropriate ADH production) [E22.2] 10/12/2013 Yes     Chronic    Hypothyroidism [E03.9] 10/12/2013 Yes     Chronic    Anxiety [F41.9] 10/12/2013 Yes     Chronic      Problems Resolved During this Admission:       Discharged Condition: against medical advice    Disposition: Left Against Medical Adv*    Follow Up:    Patient Instructions:      Ambulatory referral/consult to Hepatology   Standing Status: Future   Referral Priority: Urgent Referral Type: Consultation   Referral Reason:  Specialty Services Required   Requested Specialty: Hepatology   Number of Visits Requested: 1     Ambulatory referral/consult to Adult Neuropsychology   Standing Status: Future   Referral Priority: Urgent Referral Type: Psychiatric   Referral Reason: Specialty Services Required   Number of Visits Requested: 1     Ambulatory referral/consult to Neurology   Standing Status: Future   Referral Priority: Urgent Referral Type: Consultation   Referral Reason: Specialty Services Required   Requested Specialty: Neurology   Number of Visits Requested: 1     Ambulatory referral/consult to Nephrology   Standing Status: Future   Referral Priority: Urgent Referral Type: Consultation   Referral Reason: Specialty Services Required   Requested Specialty: Nephrology   Number of Visits Requested: 1       Significant Diagnostic Studies: Labs:   CMP   Recent Labs   Lab 03/05/23  0608 03/06/23  0353 03/06/23  1359   * 131* 130*   K 3.4* 4.1 4.5   CL 98 103 102   CO2 23 22* 22*   GLU 78 73 101   BUN 20 17 18   CREATININE 1.3 1.3 1.3   CALCIUM 8.4* 8.7 8.7   PROT 6.3 6.0  --    ALBUMIN 2.7* 2.6* 2.6*   BILITOT 2.0* 1.7*  --    ALKPHOS 550* 512*  --    AST 65* 59*  --    ALT 41 38  --    ANIONGAP 6* 6* 6*    and CBC   Recent Labs   Lab 03/05/23  0608 03/06/23  0353   WBC 3.28* 3.58*   HGB 10.6* 9.5*   HCT 30.8* 29.0*   PLT 65* 70*     Microbiology:   Blood Culture   Lab Results   Component Value Date    LABBLOO No growth after 5 days. 02/28/2023    and Urine Culture    Lab Results   Component Value Date    LABURIN  10/16/2020     Multiple organisms isolated. None in predominance.  Repeat if    LABURIN clinically necessary. 10/16/2020       Pending Diagnostic Studies:       Procedure Component Value Units Date/Time    Osmolality, Urine [[id number]] Collected: 03/06/23 1144    Order Status: Sent Lab Status: In process Updated: 03/06/23 1346    Specimen: Urine, Clean Catch     Protime-INR [[id number]]     Order Status: Sent Lab Status: No  result     Specimen: Blood     Vitamin B1 [[id number]] Collected: 03/02/23 0511    Order Status: Sent Lab Status: In process Updated: 03/02/23 0626    Specimen: Blood            Medications:  Reconciled Home Medications:      Medication List        START taking these medications      ciprofloxacin HCl 500 MG tablet  Commonly known as: CIPRO  Take 1 tablet (500 mg total) by mouth every 12 (twelve) hours. for 7 days     predniSONE 10 MG tablet  Commonly known as: DELTASONE  Take 6 tablets (60 mg total) by mouth once daily for 3 days, THEN 4 tablets (40 mg total) once daily for 3 days, THEN 2 tablets (20 mg total) once daily for 3 days, THEN 1 tablet (10 mg total) once daily for 3 days, THEN 0.5 tablets (5 mg total) once daily for 3 days.  Start taking on: March 7, 2023     rifAXIMin 550 mg Tab  Commonly known as: XIFAXAN  Take 1 tablet (550 mg total) by mouth 2 (two) times daily.            CHANGE how you take these medications      acetaminophen 325 MG tablet  Commonly known as: TYLENOL  Take 2 tablets (650 mg total) by mouth every 6 (six) hours as needed.  What changed:   when to take this  reasons to take this     calcium carbonate 200 mg calcium (500 mg) chewable tablet  Commonly known as: TUMS  Take 1 tablet (500 mg total) by mouth 2 (two) times daily as needed.  What changed:   how much to take  when to take this  reasons to take this     furosemide 20 MG tablet  Commonly known as: LASIX  Take 2 tablets (40 mg total) by mouth once daily.  What changed:   how much to take  when to take this     lactulose 10 gram/15 mL solution  Commonly known as: CHRONULAC  Take 30 mLs (20 g total) by mouth 3 (three) times daily.  What changed: when to take this            CONTINUE taking these medications      butalbitaL-acetaminophen  mg Tab  Take 1 tablet by mouth 2 (two) times daily as needed (headaches).     CALTRATE GUMMY BITES ORAL  Take by mouth.     fluorometholone 0.1% 0.1 % Drps  Commonly known as: FML  Place 2  drops into the left eye 2 (two) times daily.     hydrocortisone 2.5 % cream  Apply topically to affected area twice daily as needed     levothyroxine 75 MCG tablet  Commonly known as: SYNTHROID  TAKE 1 TABLET(75 MCG) BY MOUTH EVERY DAY     magnesium oxide 400 mg (241.3 mg magnesium) tablet  Commonly known as: MAG-OX  TAKE 1 TABLET(400 MG) BY MOUTH TWICE DAILY     multivitamin capsule  Take 1 capsule by mouth once daily.     ondansetron 4 MG Tbdl  Commonly known as: ZOFRAN-ODT  DISSOLVE 1 TABLET(4 MG) ON THE TONGUE EVERY 6 HOURS AS NEEDED     pantoprazole 40 MG tablet  Commonly known as: PROTONIX  Take 1 tablet (40 mg total) by mouth once daily.     polyethylene glycol 17 gram/dose powder  Commonly known as: GLYCOLAX  MIX 17 GRAMS (1 capful) IN LIQUID AND DRINK BY MOUTH TWICE DAILY     pravastatin 10 MG tablet  Commonly known as: PRAVACHOL  Take 1 tablet (10 mg total) by mouth once daily.     promethazine 25 MG tablet  Commonly known as: PHENERGAN  TAKE 1 TABLET BY MOUTH EVERY 4 HOURS AS NEEDED IF UNRELIEVED BY ZOFRAN     psyllium husk (aspartame) 3.4 gram Pwpk packet  Commonly known as: METAMUCIL  Take 1 packet by mouth once daily.     ramipriL 5 MG capsule  Commonly known as: ALTACE  TAKE 1 CAPSULE(5 MG) BY MOUTH EVERY EVENING     SHINGRIX (PF) 50 mcg/0.5 mL injection  Generic drug: varicella-zoster [id number] (PF)     tacrolimus 0.5 MG Cap  Commonly known as: PROGRAF  Take 1 capsule (0.5 mg total) by mouth every 12 (twelve) hours.     valACYclovir 1000 MG tablet  Commonly known as: VALTREX  TAKE 2 TABLETS(2000 MG) BY MOUTH TWICE DAILY FOR 2 DOSES     venlafaxine 150 MG Cp24  Commonly known as: EFFEXOR-XR  TAKE 1 CAPSULE(150 MG) BY MOUTH EVERY DAY            STOP taking these medications      clonazePAM 0.5 MG tablet  Commonly known as: KlonoPIN              Indwelling Lines/Drains at time of discharge:   Lines/Drains/Airways       None                   Time spent on the discharge of patient: 45 minutes          Lizeth Worley MD  Department of Hospital Medicine  [facility] - Telemetry Stepdown

## 2023-03-06 NOTE — NURSING
Patient stomach is more distended and painful. On call provider made aware and gave 1x dose of med. Attending added a new medication to patient MAR to start at 2100. On call attending was made aware that pharmacy and the patient didn't have the medication. I will pass on to the oncoming nurse to follow up with Attending team.

## 2023-03-06 NOTE — PT/OT/SLP PROGRESS
Occupational Therapy      Patient Name:  Elda Hernandez   MRN:  [id number]    Pt attempted 11:08 am . Patient not seen today secondary to Pt fatigued in AM, requesting OT return at later time. Upon OT second attempt at 2:20 pm , Pt Off the floor for CT scan, . Will follow-up as able.    3/6/2023

## 2023-03-06 NOTE — PROGRESS NOTES
"[facility] - Telemetry [facility] Medicine  Progress Note    Patient Name: Elda Hernandez  MRN: [id number]  Patient Class: IP- Inpatient   Admission Date: 2/28/2023  Length of Stay: 6 days  Attending Physician: Lizeth Worley MD  Primary Care Provider: David Lorenzo MD        Subjective:     Principal Problem:Decompensated liver disease        HPI:  Elda Hernandez is a 52-year-old woman a past medical history of primary hypertension, cirrhosis of transplanted liver, Von Gierke disease s/p transplant, and hypothyroidism, who presents to the emergency department with altered mental status. The patient is accompanied by her  who assists in providing the history. Per report, the patient has been confused and repetitive with her speech per her 's report for the past week. The patient follows closely with the Liver Transplant clinic and recently had her ammonia checked given the confusion and it was noted that it was elevated. She was recently started on lactulose by her provider and states that she has been having at least one bowel movement per day with the exception of yesterday when the patient said she had multiple episodes of loose, runny stool. She also notes increasing abdominal distention and generalized abdominal pain. The patient says that the last time she was hospitalized, a paracentesis was attempted but was not successful given there was not a pocket of fluid that was able to be drained. The patient denies any fevers, chills or rigors but states that she often does not become febrile because of her immunosuppression. Additionally, the patient complains of a left-sided neck mass that has been present for "some time." She says that her primary care physician is aware of the mass and that he ordered an ultrasound to further investigate the mass but says that it has not yet been completed. She also complains of a severe headache that is generalized. She denies any chest pain, shortness of " "breath or dyspnea on exertion.    In the emergency department, the patient was noted to be have stable vital signs upon arrival. Her labs were significant for a chronic neutropenia and thrombocytopenia with platelet count of 54. Potassium was noted to be 3.3. Magnesium was 1.5. The patient's liver enzymes elevated with AST 77 ALT 52 and alkaline phosphatase 582. Bilirubin at 2.3. Ammonia at 56. A CT head was ordered which revealed no acute abnormality. The patient was admitted to Hospital Medicine for further management.      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 Asterixis noted on examination. Mildly elevated LFTs. Started on lactulose and rifaximin. Per IR, During ultrasound evaluation, no ascites identified for safe paracentesis.  Liver ultrasound with Doppler without evidence of portal venous thrombosis and without significant ascites but notable for 2.8 cm hypo attenuation.  MRI brain W WO with subtle findings concerning for hepatic encephalopathy though other toxic or metabolic derangement or encephalitis could have similar appearance; no acute process.  Also notable for Mild chronic small vessel ischemic change with multiple small remote bilateral cerebellar infarcts.  Discussed with hepatology, and concern that encephalopathy not solely due to hepatic encephalopathy.  Neurology consulted. "MRI with objective findings of cerebellar infarcts likely accounting for physical presentation. " CT abdomen pelvis with contrast completed. "Mild interval increase in size of hypodense hepatic nodule.  The questionable lesion identified on ultrasound not confidently seen on this single phase exam though the nodular contour of the liver appears similar prior CT.  Triple phase liver study can " "be repeated at no additional charge. "Discussed with hepatology.  MRI abdomen ordered to further evaluate. " No suspicious enhancing hepatic lesion.  The lesion described on prior CT is less conspicuous on MRI without concerning enhancement."      Interval History:   Patient seen and examined at bedside.  Awake and alert. She is oriented to person, place, time.  Complaining of ongoing headache today, concerns regarding sodium, and abdominal pain.  Requesting IV pain medication for headache; discussed again concern for rebound headache.  Discussed initiation of steroids to address intake with Neurology and hepatology.  Patient updated regarding care plan.    Review of Systems   Constitutional:  Negative for chills, fatigue and fever.   HENT:  Negative for congestion, postnasal drip, rhinorrhea, sneezing and sore throat.    Eyes:  Negative for photophobia and visual disturbance.   Respiratory:  Negative for cough, choking, chest tightness and shortness of breath.    Cardiovascular:  Negative for chest pain, palpitations and leg swelling.   Gastrointestinal:  Positive for abdominal pain. Negative for abdominal distention, constipation, diarrhea, nausea and vomiting.   Endocrine: Negative for polyphagia and polyuria.   Genitourinary:  Negative for decreased urine volume, difficulty urinating, dysuria and urgency.   Musculoskeletal:  Positive for gait problem. Negative for arthralgias and myalgias.   Skin:  Negative for rash and wound.   Neurological:  Positive for dizziness, tremors, light-headedness and headaches. Negative for seizures and syncope.   Psychiatric/Behavioral:  Negative for agitation and decreased concentration.    Objective:     Vital Signs (Most Recent):  Temp: 98.5 °F (36.9 °C) (03/06/23 1149)  Pulse: 74 (03/06/23 1149)  Resp: 16 (03/06/23 1149)  BP: 129/60 (03/06/23 1149)  SpO2: 100 % (03/06/23 1149)   Vital Signs (24h Range):  Temp:  [97.4 °F (36.3 °C)-98.7 °F (37.1 °C)] 98.5 °F (36.9 °C)  Pulse:  " [62-76] 74  Resp:  [12-19] 16  SpO2:  [94 %-100 %] 100 %  BP: (122-148)/(55-68) 129/60     Weight: 52.6 kg (115 lb 15.4 oz)  Body mass index is 23.42 kg/m².    Intake/Output Summary (Last 24 hours) at 3/6/2023 1308  Last data filed at 3/6/2023 1229  Gross per 24 hour   Intake 793.46 ml   Output 500 ml   Net 293.46 ml        Physical Exam  Vitals and nursing note reviewed.   Constitutional:       General: She is not in acute distress.     Appearance: She is ill-appearing (chronically). She is not toxic-appearing or diaphoretic.   HENT:      Head: Normocephalic and atraumatic.      Right Ear: External ear normal.      Left Ear: External ear normal.      Mouth/Throat:      Mouth: Mucous membranes are moist.   Eyes:      General: No scleral icterus.        Right eye: No discharge.         Left eye: No discharge.   Cardiovascular:      Rate and Rhythm: Normal rate and regular rhythm.      Pulses: Normal pulses.      Heart sounds: Normal heart sounds. No murmur heard.    No friction rub. No gallop.   Pulmonary:      Effort: Pulmonary effort is normal. No respiratory distress.      Breath sounds: No stridor. No wheezing, rhonchi or rales.   Chest:      Chest wall: No tenderness.   Abdominal:      General: Abdomen is flat. Bowel sounds are normal. There is distension.      Palpations: Abdomen is soft. There is no mass.      Tenderness: There is abdominal tenderness. There is no guarding or rebound.      Hernia: No hernia is present.   Musculoskeletal:      Cervical back: Normal range of motion and neck supple.      Right lower leg: No edema.      Left lower leg: No edema.   Skin:     General: Skin is warm and dry.   Neurological:      Mental Status: She is alert and oriented to person, place, and time.      Sensory: No sensory deficit.      Motor: No weakness.      Comments: + mild asterixis, oriented but pressured speech   Psychiatric:         Behavior: Behavior normal.       Significant Labs: All pertinent labs within the  past 24 hours have been reviewed.    Recent Results (from the past 24 hour(s))   Protime-INR    Collection Time: 03/06/23  3:53 AM   Result Value Ref Range    Prothrombin Time 11.4 9.0 - 12.5 sec    INR 1.1 0.8 - 1.2   Tacrolimus level    Collection Time: 03/06/23  3:53 AM   Result Value Ref Range    Tacrolimus Lvl 6.3 5.0 - 15.0 ng/mL   CBC auto differential    Collection Time: 03/06/23  3:53 AM   Result Value Ref Range    WBC 3.58 (L) 3.90 - 12.70 K/uL    RBC 3.12 (L) 4.00 - 5.40 M/uL    Hemoglobin 9.5 (L) 12.0 - 16.0 g/dL    Hematocrit 29.0 (L) 37.0 - 48.5 %    MCV 93 82 - 98 fL    MCH 30.4 27.0 - 31.0 pg    MCHC 32.8 32.0 - 36.0 g/dL    RDW 15.1 (H) 11.5 - 14.5 %    Platelets 70 (L) 150 - 450 K/uL    MPV 12.6 9.2 - 12.9 fL    Immature Granulocytes 0.3 0.0 - 0.5 %    Gran # (ANC) 1.8 1.8 - 7.7 K/uL    Immature Grans (Abs) 0.01 0.00 - 0.04 K/uL    Lymph # 0.6 (L) 1.0 - 4.8 K/uL    Mono # 0.7 0.3 - 1.0 K/uL    Eos # 0.4 0.0 - 0.5 K/uL    Baso # 0.05 0.00 - 0.20 K/uL    nRBC 0 0 /100 WBC    Gran % 51.4 38.0 - 73.0 %    Lymph % 17.0 (L) 18.0 - 48.0 %    Mono % 19.8 (H) 4.0 - 15.0 %    Eosinophil % 10.1 (H) 0.0 - 8.0 %    Basophil % 1.4 0.0 - 1.9 %    Differential Method Automated    Comprehensive metabolic panel    Collection Time: 03/06/23  3:53 AM   Result Value Ref Range    Sodium 131 (L) 136 - 145 mmol/L    Potassium 4.1 3.5 - 5.1 mmol/L    Chloride 103 95 - 110 mmol/L    CO2 22 (L) 23 - 29 mmol/L    Glucose 73 70 - 110 mg/dL    BUN 17 6 - 20 mg/dL    Creatinine 1.3 0.5 - 1.4 mg/dL    Calcium 8.7 8.7 - 10.5 mg/dL    Total Protein 6.0 6.0 - 8.4 g/dL    Albumin 2.6 (L) 3.5 - 5.2 g/dL    Total Bilirubin 1.7 (H) 0.1 - 1.0 mg/dL    Alkaline Phosphatase 512 (H) 55 - 135 U/L    AST 59 (H) 10 - 40 U/L    ALT 38 10 - 44 U/L    Anion Gap 6 (L) 8 - 16 mmol/L    eGFR 48.9 (A) >60 mL/min/1.73 m^2   Magnesium    Collection Time: 03/06/23  3:53 AM   Result Value Ref Range    Magnesium 2.2 1.6 - 2.6 mg/dL   Osmolality, Serum     Collection Time: 03/06/23  3:53 AM   Result Value Ref Range    Osmolality 280 275 - 295 mOsm/kg         Significant Imaging: I have reviewed all pertinent imaging results/findings within the past 24 hours.    Imaging Results              US Doppler Liver Transplant Post (xpd) (Final result)  Result time 03/01/23 02:08:28   Procedure changed from US Liver with Doppler (xpd)     Final result by Clark Morales MD (03/01/23 02:08:28)                   Impression:      No evidence of portal venous thrombosis as questioned.  Satisfactory Doppler evaluation of the liver transplant.    Cirrhotic morphology of the hepatic allograft.  Questionable 2.8 cm hypoechoic area at the periphery of the left hepatic lobe which may reflect changes related to nodular contour versus possible lesion.  Consider further evaluation with multiphase CT or MRI if clinically warranted.    Electronically signed by resident: Lazaro Pacheco  Date:    03/01/2023  Time:    01:36    Electronically signed by: Clark Morales MD  Date:    03/01/2023  Time:    02:08               Narrative:    EXAMINATION:  US DOPPLER LIVER TRANSPLANT POST (XPD)    CLINICAL HISTORY:  R/O portal vein thrombosis; R/O Ascites;    TECHNIQUE:  Limited abdominal ultrasound of the transplant liver with Doppler evaluation.  Color and spectral Doppler were performed.    COMPARISON:  Liver transplant Doppler 01/03/2023, 03/25/2022, CT abdomen pelvis 03/24/2022    FINDINGS:  Patient is status post orthotopic liver transplant in 2002.  Liver allograft is demonstrates a nodular contour with heterogeneous echotexture suggestive of cirrhosis.  2.3 x 2.5 x 2.8 cm questionable slightly hypoattenuating area in the left hepatic lobe demonstrating vascularity, which may reflect nodular changes related to cirrhosis over a left hepatic lobe lesion.  No fluid collections or significant upper abdominal ascites.    The common duct is not significantly distended for post cholecystectomy  status, maximally measuring 8 mm and similar to prior noting it measured up to 7 mm on 03/25/2022.  No dilated intrahepatic radicles are seen.    Color flow and spectral waveform analysis was performed.  The main portal vein, right portal vein, left portal vein, middle hepatic vein, right hepatic vein, left hepatic vein, and IVC are patent with proper directional flow.  Peak velocity of the main portal vein is 30 cm/sec.    Anastomosis site of the main hepatic artery demonstrates a peak systolic velocity 131 cm/sec.  (Previously 137)    Main hepatic artery demonstrates resistive index 0.77 with normal waveform.  (Previously 0.79)    Left hepatic artery shows resistive index 0.75 with normal waveform.  (Previously 0.81)    Anterior branch of the right hepatic artery demonstrates resistive index 0.75 with normal waveform.  (Previously 0.81)    Posterior branch of the right hepatic artery demonstrates resistive index 0.72 with normal waveform.  (Previously 0.79)                                       CT Soft Tissue Neck With Contrast (Final result)  Result time 02/28/23 23:47:24      Final result by Clark Morales MD (02/28/23 23:47:24)                   Impression:      No drainable abscess identified in the neck soft tissues.  Further evaluation/follow-up including direct visualization as clinically warranted.    Patulous appearance of the esophagus with fluid and air in the esophageal lumen.    Additional findings discussed in the body of the report.      Electronically signed by: Clark Morales MD  Date:    02/28/2023  Time:    23:47               Narrative:    EXAMINATION:  CT SOFT TISSUE NECK WITH CONTRAST    CLINICAL HISTORY:  Neck mass, nonpulsatile;Neck abscess, deep tissue; No additional clinical history or physical exam provided in the chart at the time of dictation.    TECHNIQUE:  CT images obtained throughout the region of the neck after the administration of 75 mL Omnipaque 350 intravenous contrast.  Axial, sagittal and coronal reconstructions were performed.    COMPARISON:  CT chest, 09/04/2020.    FINDINGS:  Exam quality is limited by suboptimal positioning and motion artifact.    No abnormal fluid collection identified in the neck soft tissues.  No convincing enhancing mass identified in the neck soft tissues.  Recommend correlation with physical exam and/or direct visualization as clinically warranted.    Epiglottis is unremarkable.  The parotid and submandibular glands are symmetric without significant adjacent fat stranding.  Subcentimeter hypodensity in the left lobe of the thyroid gland, too small for dedicated follow-up.    Major vessels of the neck appear patent.  Atherosclerosis and narrowing of the bilateral carotid arteries.  Limited intracranial evaluation appears negative for acute finding.  The visualized paranasal sinuses are essentially clear.  Postoperative changes in the right mastoid air cells.  Left mastoid air cells are essentially clear.  Postoperative changes of suboccipital craniectomy.    There is a questionable partially visualized nodule versus atelectasis or scarring in the right upper lobe (axial series 3, image 394).  This finding is likely similar when compared with prior CT dated 09/04/2020.  Lung apices are otherwise unremarkable.    Patulous appearance of the visualized esophagus with air and fluid in the mid esophageal lumen.    No aggressive osseous lesion identified.  Minimal vertebral body height loss at T3, likely chronic.  Partially visualized postoperative changes of prior median sternotomy.                                       X-Ray Chest AP Portable (Final result)  Result time 02/28/23 19:36:17      Final result by Samuel Grant MD (02/28/23 19:36:17)                   Impression:      1. No acute cardiopulmonary process.      Electronically signed by: Samuel Grant MD  Date:    02/28/2023  Time:    19:36               Narrative:    EXAMINATION:  XR CHEST AP  PORTABLE    CLINICAL HISTORY:  ams;    TECHNIQUE:  Single frontal view of the chest was performed.    COMPARISON:  03/24/2022    FINDINGS:  The cardiomediastinal silhouette is not enlarged, magnified by technique.  There is no pleural effusion.  The trachea is midline.  The lungs are symmetrically expanded bilaterally without evidence of acute parenchymal process. No large focal consolidation seen.  There is no pneumothorax.  The osseous structures are remarkable for degenerative changes.  Patient is rotated..                                       CT Head Without Contrast (Final result)  Result time 02/28/23 18:54:58      Final result by González Kwok MD (02/28/23 18:54:58)                   Impression:      No evidence of acute intracranial pathology.  Additional evaluation, as clinically warranted.    Stable multifocal parenchymal calcifications, nonspecific and potentially related to remote infectious or metabolic etiology.    Electronically signed by resident: Jakob Monteiro  Date:    02/28/2023  Time:    18:36    Electronically signed by: González Kwok MD  Date:    02/28/2023  Time:    18:54               Narrative:    EXAMINATION:  CT HEAD WITHOUT CONTRAST    CLINICAL HISTORY:  Headache, chronic, new features or increased frequency;    TECHNIQUE:  Low dose axial CT images obtained throughout the head without the use of intravenous contrast.  Axial, sagittal and coronal reconstructions were performed.    COMPARISON:  CT 09/25/2020, MRI 04/24/2015    FINDINGS:  Intracranial compartment:    The ventricular configuration appears stable from prior exam without evidence of hydrocephalus.    Brain parenchyma appears unchanged with multifocal parenchymal calcifications stable in distribution compared to the prior exam.  No parenchymal mass, hemorrhage, edema or major vascular distribution infarct. No extra-axial blood or fluid collections.    Skull/extracranial contents (limited evaluation):    Remote postoperative  "changes of suboccipital craniectomy.  Prior right mastoidectomy.  Left mastoid air cells are clear.  Visualized paranasal sinuses are clear.                                          Assessment/Plan:      * Decompensated liver disease  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  Encephalopathy    Patient presenting with confusion and disorientation with notable flapping tremor concerning for hepatic encephalopathy. 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 Patient also with abdominal distention and generalized abdominal pain concerning for underlying ascites. She denies any recent GI bleeding. Follows with Liver Transplant clinic as an outpatient.    - Hepatology consulted, appreciate recommendations  -During IR ultrasound evaluation, no ascites identified for safe paracentesis  -Due to ongoing pain and evidence of ascites on imaging, rocephin initiated 3/6  -U/S liver w/ Questionable 2.8 cm hypoechoic area at the periphery of the left hepatic lobe; CT triple phase to further evaluate inconclusive; discussed with hepatology, MRI abdomen " No suspicious enhancing hepatic lesion.  The lesion described on prior CT is less conspicuous on MRI without concerning enhancement."  - lactulose 30 g TID  - rifaximin 500 mg BID    MELD-Na score: 18 at 3/6/2023  3:53 AM  MELD score: 12 at 3/6/2023  3:53 AM  Calculated from:  Serum Creatinine: 1.3 mg/dL at 3/6/2023  3:53 AM  Serum Sodium: 131 mmol/L at 3/6/2023  3:53 AM  Total Bilirubin: 1.7 mg/dL at 3/6/2023  3:53 AM  INR(ratio): 1.1 at 3/6/2023  3:53 AM  Age: 52 years      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  Tacrolimus 0.5 mg BID. Trend tacrolimus levels daily.  Appreciate hepatology input.      Ataxia  Per neurology, MRI with objective findings of cerebellar infarcts likely accounting for physical presentation.   Physical exam findings consistent with and correlates to old cerebellar infarct. Pt would " benefit from outpatient physical/vestibular therapy.      Depression  Patient has persistent depression which is unknown and is currently controlled. Will Continue anti-depressant medications. We will not consult psychiatry at this time. Patient does not display psychosis at this time. Continue to monitor closely and adjust plan of care as needed. venlafaxine 150 mg daily.        HA (headache)  Hx migraines  Unrelenting  Concern for med overuse HA  D/w neuro on 3/6; steroid taper initiated       JACQUE (acute kidney injury)  Patient with acute kidney injury likely due to IVVD/dehydration JACQUE is currently improving. Labs reviewed- Renal function/electrolytes with Estimated Creatinine Clearance: 38.1 mL/min (based on SCr of 1.3 mg/dL). according to latest data. Monitor urine output and serial BMP and adjust therapy as needed. Avoid nephrotoxins and renally dose meds for GFR listed above.   Recent contrast with studies in addition to poor p.o. intake and volume loss with lactulose  Improved s/p IVF      Hypothyroidism  Resume home levothyroxine.      Anxiety  Home medication includes Klonopin 0.5 mg b.i.d. p.r.n.  Potentially contributing to encephalopathy, will discuss with patient regarding reduction and eventual cessation      Depression, recurrent  Resume home           SIADH (syndrome of inappropriate ADH production)  Hyponatremia    Patient used to take demeclocycline at home. Patient's sodium level within normal limits upon admission. Will continue to trend.  Na 131, improved  Urine studies in process          VTE Risk Mitigation (From admission, onward)         Ordered     Place sequential compression device  Until discontinued         03/01/23 0151     IP VTE LOW RISK PATIENT  Once         02/28/23 1958                Discharge Planning   MARILEE: 3/8/2023     Code Status: Full Code   Is the patient medically ready for discharge?: No    Reason for patient still in hospital (select all that apply): Patient trending  condition, Laboratory test, Treatment, Imaging, Consult recommendations and Pending disposition  Discharge Plan A: Home with family, Home Health   Discharge Delays: None known at this time              Lizeth Worley MD  Department of Hospital Medicine   [facility] - Telemetry Stepdown

## 2023-03-06 NOTE — ASSESSMENT & PLAN NOTE
Hyponatremia    Patient used to take demeclocycline at home. Patient's sodium level within normal limits upon admission. Will continue to trend.  Na 131, improved  Urine studies in process

## 2023-03-06 NOTE — PT/OT/SLP PROGRESS
"Physical Therapy Treatment    Patient Name:  Elda Hernandez   MRN:  [id number]    Recommendations:     Discharge Recommendations: other (see comments) (continued skilled PT intervention is recommend following discharge from acute care services)  Discharge Equipment Recommendations: bedside commode  Barriers to discharge: Inaccessible home and Decreased caregiver support    Assessment:     Elda Hernandez is a 52 y.o. female admitted with a medical diagnosis of Decompensated liver disease.  She presents with the following impairments/functional limitations: weakness, impaired cognition, impaired functional mobility, impaired self care skills, impaired balance, impaired endurance, decreased safety awareness, decreased coordination, gait instability. Pt asleep and became aroused and agreed to PT. Pt performed therapeutic exercises @ EOB and ambulated in room @ HHA. Pt will cont to benefit from skilled acute PT to improve mobility, strength and endurance to perform functional tasks.    Rehab Prognosis: Good; patient would benefit from acute skilled PT services to address these deficits and reach maximum level of function.    Recent Surgery: * No surgery found *      Plan:     During this hospitalization, patient to be seen 3 x/week to address the identified rehab impairments via gait training, therapeutic activities, therapeutic exercises, neuromuscular re-education and progress toward the following goals:    Plan of Care Expires:  03/23/23    Subjective     Chief Complaint: "I'm hurting and I'm tired; I'm worried that my Na Ievel"  Patient/Family Comments/goals: "What do you need me to do"  Pain/Comfort:  Pain Rating 1: 8/10  Location - Side 1: Bilateral  Location - Orientation 1: anterior  Location 1: abdomen  Pain Addressed 1: Reposition, Pre-medicate for activity, Cessation of Activity, Distraction      Objective:     Communicated with nurse prior to session.  Patient found HOB elevated with telemetry, peripheral " IV upon PT entry to room.     General Precautions: Standard, fall  Orthopedic Precautions: N/A  Braces: N/A  Respiratory Status: Room air     Functional Mobility:  Bed Mobility:     Rolling Left:  supervision  Rolling Right: supervision  Scooting: supervision  Supine to Sit: supervision  Sit to Supine: supervision  Transfers:     Sit to Stand:  stand by assistance with hand-held assist  Gait: 12*2 ft in room using HHA decreased step length, decrease aaron, and unsteady gait   Balance: Good sitting balance, fair dynamic standing balance      AM-PAC 6 CLICK MOBILITY  Turning over in bed (including adjusting bedclothes, sheets and blankets)?: 4  Sitting down on and standing up from a chair with arms (e.g., wheelchair, bedside commode, etc.): 4  Moving from lying on back to sitting on the side of the bed?: 3  Moving to and from a bed to a chair (including a wheelchair)?: 3  Need to walk in hospital room?: 3  Climbing 3-5 steps with a railing?: 2  Basic Mobility Total Score: 19       Treatment & Education:  Therapeutic exercises: marching, LAQ's, heel/toe raises x 10 reps  Pt had increase dizziness during ambulation   Pt was educated on progress towards PT goals    Patient left HOB elevated with call button in reach and nurse notified..    GOALS:   Multidisciplinary Problems       Physical Therapy Goals          Problem: Physical Therapy    Goal Priority Disciplines Outcome Goal Variances Interventions   Physical Therapy Goal     PT, PT/OT Ongoing, Progressing     Description: Goals to be met by: 3/23/2023    Patient will increase functional independence with mobility by performin. Supine to sit with Modified [place]  2. Sit to supine with Modified [place]  3. Sit to stand transfer with Stand-by Assistance  4. Bed to chair transfer with Stand-by Assistance using Rolling Walker/LRAD  5. Gait  x 100 feet with Stand-by Assistance using Rolling Walker/LRAD.   6. Ascend/Descend 6 inch curb step with Contact  Guard Assistance using Rolling Walker/LRAD.                         Time Tracking:     PT Received On: 03/06/23  PT Start Time: 0933     PT Stop Time: 0953  PT Total Time (min): 20 min     Billable Minutes: Therapeutic Activity 20    Treatment Type: Treatment  PT/PTA: PTA     PTA Visit Number: 1     03/06/2023

## 2023-03-06 NOTE — TREATMENT PLAN
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 She did not have enough ascites for sampling to rule out SBP. She is status post up-titration of Lactulose with improvement in mentation. Neurological consulted with regards to ataxia and status post CT head and MRI showing cerebellar infarcts; recommendation for outpatient vestibular therapy.     Interval History  Vital signs stable on room air. Labs notable for improving JACQUE (Cr 1.3, stable hyperbilirubinemia (1.7), and stable liver function tests. Tacrolimus level 6.3.    Plan  - Lactulose TID and Rifaximin BID; titrate dose frequency of Lactulose to 3 bowel movement daily.   - Tacrolimus 0.5 mg BID.   - Hold home Lasix with JACQUE.   - Minimize use of sedating agents that may precipitate encephalopathy (e.g. opioids and benzo's).   - CMP, INR, and Tacrolimus level daily.     Thank you for involving us in the care of Elda Hernandez. Please call with any additional concerns or questions.        Darshan Cruz MD  Internal Medicine, PGY-3  [facility]

## 2023-03-06 NOTE — ASSESSMENT & PLAN NOTE
Hx migraines  Unrelenting  Concern for med overuse HA  D/w neuro on 3/6; steroid taper initiated

## 2023-03-07 ENCOUNTER — TELEPHONE (OUTPATIENT)
Dept: TRANSPLANT | Facility: CLINIC | Age: 53
End: 2023-03-07
Payer: MEDICARE

## 2023-03-07 NOTE — TELEPHONE ENCOUNTER
----- Message from Jaya Nielsen MD sent at 3/7/2023  3:18 PM CST -----  monthly  ----- Message -----  From: Maude Chau RN  Sent: 3/7/2023   8:48 AM CST  To: Jaya Nielsen MD    Patient left AMA she was not happy that the plan was to manage her abdominal pain with lasix and she was not happy with that.  The note said she was displaying opoid seeking behavior, she was requesting hydromorphone for pain relief.  How often do you want labs on her?  She was started on Lactulose and Rifaximin.

## 2023-03-07 NOTE — NURSING
Pt with complaints of pain throughout the day.   PRN pain meds dc'ed per neuro. RN at bedside to explain. Pt visibly upset. MD ([facility]) notified [id number].    MD at bedside to explain.    Lidocaine patch and diclofenac cream ordered.    Pt refused both medications and requests AMA. MD notified [id number].    AMA signed by MD [id number], pt refused to sign.     at bedside, pt expressed being unsure if she wanted to leave. RN and charge at bedside for discussion. Pt given 15 min to make final decision.    MD notified.

## 2023-03-07 NOTE — PLAN OF CARE
[facility] - Telemetry Stepdown  Discharge Final Note    Primary Care Provider: David Lorenzo MD    Expected Discharge Date: 3/6/2023    Final Discharge Note (most recent)       Final Note - 03/07/23 1336          Final Note    Assessment Type Final Discharge Note     Anticipated Discharge Disposition Left Against Medical Advice     Hospital Resources/Appts/Education Provided --   MD entered ambulatory referrals       Post-Acute Status    Discharge Delays None known at this time                 Giana Gerber LCSW  [facility]  Ext. [phone]

## 2023-03-09 LAB — VIT B1 BLD-MCNC: 67 UG/L (ref 38–122)

## 2023-03-10 ENCOUNTER — PES CALL (OUTPATIENT)
Dept: ADMINISTRATIVE | Facility: CLINIC | Age: 53
End: 2023-03-10
Payer: MEDICARE

## 2023-03-14 ENCOUNTER — TELEPHONE (OUTPATIENT)
Dept: TRANSPLANT | Facility: CLINIC | Age: 53
End: 2023-03-14
Payer: MEDICARE

## 2023-03-14 NOTE — TELEPHONE ENCOUNTER
Spoke to patient. Called to offer an appointment to see Dr. Nielsen patient declined earlier appointment due to the time of the appointment. Appointment scheduled for 5/23/23 for 11:00am. An appointment reminder will be sent to the address on file.

## 2023-04-06 ENCOUNTER — PATIENT MESSAGE (OUTPATIENT)
Dept: FAMILY MEDICINE | Facility: CLINIC | Age: 53
End: 2023-04-06
Payer: MEDICARE

## 2023-04-06 DIAGNOSIS — R11.0 NAUSEA: ICD-10-CM

## 2023-04-06 DIAGNOSIS — K21.9 GASTROESOPHAGEAL REFLUX DISEASE WITHOUT ESOPHAGITIS: ICD-10-CM

## 2023-04-06 DIAGNOSIS — B00.1 FEVER BLISTER: ICD-10-CM

## 2023-04-06 DIAGNOSIS — G47.01 INSOMNIA DUE TO MEDICAL CONDITION: ICD-10-CM

## 2023-04-06 RX ORDER — VALACYCLOVIR HYDROCHLORIDE 1 G/1
TABLET, FILM COATED ORAL
Qty: 4 TABLET | Refills: 0 | Status: SHIPPED | OUTPATIENT
Start: 2023-04-06

## 2023-04-06 RX ORDER — PENCICLOVIR 10 MG/G
CREAM TOPICAL
Status: CANCELLED | OUTPATIENT
Start: 2023-04-06

## 2023-04-06 NOTE — TELEPHONE ENCOUNTER
No new care gaps identified.  [facility] Embedded Care Gaps. Reference number: [id number]. 4/06/2023   12:56:55 PM CDT

## 2023-04-07 RX ORDER — PROMETHAZINE HYDROCHLORIDE 25 MG/1
TABLET ORAL
Qty: 30 TABLET | Refills: 0 | Status: SHIPPED | OUTPATIENT
Start: 2023-04-07 | End: 2023-07-20 | Stop reason: SDUPTHER

## 2023-04-07 RX ORDER — CLONAZEPAM 0.5 MG/1
TABLET ORAL
Qty: 30 TABLET | Refills: 3 | Status: SHIPPED | OUTPATIENT
Start: 2023-04-07 | End: 2023-06-22 | Stop reason: SDUPTHER

## 2023-04-07 RX ORDER — VALACYCLOVIR HYDROCHLORIDE 1 G/1
TABLET, FILM COATED ORAL
Qty: 4 TABLET | Refills: 3 | OUTPATIENT
Start: 2023-04-07

## 2023-04-07 RX ORDER — ONDANSETRON 4 MG/1
TABLET, ORALLY DISINTEGRATING ORAL
Qty: 30 TABLET | Refills: 0 | Status: SHIPPED | OUTPATIENT
Start: 2023-04-07 | End: 2023-09-23

## 2023-04-07 RX ORDER — VENLAFAXINE HYDROCHLORIDE 150 MG/1
CAPSULE, EXTENDED RELEASE ORAL
Qty: 90 CAPSULE | Refills: 3 | Status: SHIPPED | OUTPATIENT
Start: 2023-04-07 | End: 2023-08-11 | Stop reason: SDUPTHER

## 2023-04-07 RX ORDER — PANTOPRAZOLE SODIUM 40 MG/1
TABLET, DELAYED RELEASE ORAL
Qty: 90 TABLET | Refills: 3 | Status: SHIPPED | OUTPATIENT
Start: 2023-04-07 | End: 2023-05-26

## 2023-04-09 ENCOUNTER — PATIENT MESSAGE (OUTPATIENT)
Dept: TRANSPLANT | Facility: CLINIC | Age: 53
End: 2023-04-09
Payer: MEDICARE

## 2023-04-10 RX ORDER — PENCICLOVIR 10 MG/G
CREAM TOPICAL
Qty: 5 G | Refills: 3 | Status: SHIPPED | OUTPATIENT
Start: 2023-04-10

## 2023-04-17 ENCOUNTER — PATIENT MESSAGE (OUTPATIENT)
Dept: TRANSPLANT | Facility: CLINIC | Age: 53
End: 2023-04-17

## 2023-04-17 ENCOUNTER — TELEPHONE (OUTPATIENT)
Dept: PHARMACY | Facility: CLINIC | Age: 53
End: 2023-04-17
Payer: MEDICARE

## 2023-04-17 ENCOUNTER — TELEPHONE (OUTPATIENT)
Dept: TRANSPLANT | Facility: CLINIC | Age: 53
End: 2023-04-17
Payer: MEDICARE

## 2023-04-17 NOTE — LETTER
May 12, 2023    Elda Doran KATHY Hernandez  [address]  [facility] [address]             [facility] - Pharmacy Assistance  [address]  [facility] [address]  Phone: [phone]  Fax: [phone] Dear Ms. Hernandez    My Name is Carl Fritz. I am a Pharmacy Technician reaching out on behalf of [facility] Patient Assistance Team. We last spoke on 04/20/23 about assistance with your medication. A letter was sent to your [facility] Portal requesting documentation required to begin the Pharmacy Assistance Process. Unfortunately, we have not heard back from you. Please reach out to my phone number below if you are still in need of assistance with your medications. We look forward to hearing from you soon!     Thank you for choosing [facility] for your healthcare needs.      Carl Lam  Pharmacy Patient Assistance   Phone: [phone]

## 2023-04-17 NOTE — TELEPHONE ENCOUNTER
I have reached out to Elda Hernandez to inform her of the [facility] application process for Xifaxan and whats required to apply.  Elda Hernandez did not answer. I left a voicemail and mailed a letter introducing her to the pharmacy patient assistance program. I will follow up in 5 business days.

## 2023-04-17 NOTE — LETTER
April 17, 2023    Elda Doran KATHY Hernandez  [address]  [facility] [address]             [facility] - Pharmacy Assistance  [address]  [facility] [address]  Phone: [phone]  Fax: [phone]   Dear Ms. Hernandez,    My Name is Carl Fritz. I am a Pharmacy Technician reaching out on behalf of [facility]s Pharmacy Patient Assistance Team after receiving a referral from your provider inquiring about assistance with your medications. I tried to call you on 4/17/2023 but was unable to reach you. Our goal is to assist qualified patients with financial assistance for their medications to better help you achieve your health goals!    Please note that enrollment and eligibility into available support may require the following documents:     Proof of household Income( such as social security statement, 1099 form, pension statement or 3 consecutive pay stubs   Copy of all insurance cards (front and back)    Print out from your insurance or pharmacy showing how much you have spent on prescriptions this year   Signed HIPAA and Authorization forms (These forms will be sent to you once you contact us)     Please reach out to my phone number below if you are still in need of assistance with your medications. We will attempt to reach out to you through [email or web address] or via phone call again in 5 business days. We look forward to hearing from you soon!    Thank you for choosing [facility] for your healthcare needs    Sincerely  Carl Fritz   [address], [address], [place] [address]  Phone: [phone]  Fax: [phone]

## 2023-04-17 NOTE — LETTER
December 8, 2023    Elda Doran KATHY TrippMary  [address] [facility] [place] [address]             [facility] - Pharmacy Assistance  [address] [facility]  [address] [place] [address]  Phone: [phone]  Fax: [phone]   Dear Ms. Hernandez    My Name is Carl Fritz. I am a Pharmacy Technician reaching out on behalf of [facility]s Pharmacy Patient Assistance Team after receiving a referral from your provider, Namita Hernandez, inquiring about assistance with your medications on 11/16/23. We have tried to reach you via phone, MyChart, and a mailed letter to begin the pharmacy assistance process. Unfortunately, weve been unable to reach you. Please note that unless we hear back from you this will be our final attempt to reach you regarding your assistance.    Please reach out to my phone number below if you are still in need of assistance with your medications. We look forward to hearing from you soon!    Thank you for choosing [facility] for your healthcare needs.    Carl Fritz

## 2023-04-17 NOTE — TELEPHONE ENCOUNTER
Sent message to patient to let her know    ----- Message from Claire Strong PharmD sent at 4/17/2023  8:44 AM CDT -----    ----- Message -----  From: Charlotte Crabtree  Sent: 4/16/2023   8:10 PM CDT  To: Claire Strong PharmD, Carl Smith,     Thank you for submitting a referral to the Pharmacy Patient Assistance Team. We have received your referral for Elda Hernandez. This patient case has been assigned to Jeff Fritz, who will review the case and contact the patient with assistance options. You may review progress in the patient's chart through Telephone Encounter notes from Pharmacy Patient Assistance.       Thank you,     Pharmacy Patient Assistance Team   ----- Message -----  From: Claire Strong PharmD  Sent: 4/14/2023   9:07 AM CDT  To: Maude Chau RN, #    Maude -    I added the Pharmacy Patient Assistance Team - I think they have been able to help patients apply for Xifaxin assistance in the past    Claire  ----- Message -----  From: Maude Chau RN  Sent: 4/14/2023   7:58 AM CDT  To: Todd Hayes, #    Can you let me know if there is any help out there for Xifaxin?  Patient has a $900 co pay.     Thanks  Maude

## 2023-04-17 NOTE — LETTER
April 20, 2023    Elda Andreea KATHY Mary  [address]  [facility] [address]             [facility] - Pharmacy Assistance  [address] [facility] [address]  Phone: [phone]  Fax: [phone] Dear Ms. Elda Hernandez    It was a pleasure speaking with you. To follow up on our conversation on 4/18/2023, the Pharmacy Patient Assistance Program needs more information from you before we can submit your Xifaxan application to the [facility] Program. Please return the following documents to the Pharmacy Patient Assistance office (address, email and fax listed below) asap:      Proof of household Income( such as social security statement, 1099 form, pension statement or 3 consecutive pay stubs, Copy of all Insurance cards( front and back), Printout from your Insurance or Pharmacy that shows how much you have spent on prescriptions this year, and OCCP & Patient Authorization Forms          Whats Next:     Once I receive your documentation and authorization from your Provider, your application will be submitted to the [facility] for review. Please be advised it will take 2 to 4 weeks for your application to be processed so you may have to purchase a month's supply of medication from your pharmacy to hold you over during the waiting period. You will be notified of approval or denial by The Program(mail) or myself.     If you have any questions or concerns, please give me a call        Sincerely   Carl Fritz  [address], [address], [place] [address]  Phone:[phone]

## 2023-04-18 NOTE — TELEPHONE ENCOUNTER
I have spoken with Elda Hernandez and informed her of the [facility] application process for Xifaxan and what's required to apply.  Elda Hernandez will provide the following documents: Proof of household Income( such as social security statement, 1099 form, pension statement or 3 consecutive pay stubs, Copy of all Insurance cards( front and back), Printout from your Insurance or Pharmacy that shows how much you have spent on prescriptions this year, and OCCP & Patient Authorization Forms      I will follow up with the patient in 5 business days.

## 2023-04-20 ENCOUNTER — TELEPHONE (OUTPATIENT)
Dept: TRANSPLANT | Facility: CLINIC | Age: 53
End: 2023-04-20
Payer: MEDICARE

## 2023-04-20 ENCOUNTER — PATIENT MESSAGE (OUTPATIENT)
Dept: TRANSPLANT | Facility: CLINIC | Age: 53
End: 2023-04-20

## 2023-04-20 NOTE — TELEPHONE ENCOUNTER
"I have confirmed with  by PHONE that she does not need prescription assistance at this time. Ms. Hernandez  stated the insurance company told her to have Maude call the insurance company to ask for a "tier exception" for lower co-pay. Explain the physical effects not having the medications has on patient's health. Patient would like this avenue taken first because it would be quicker for approval odds.     Call: [phone]  "

## 2023-04-20 NOTE — TELEPHONE ENCOUNTER
I called the patient's insurance company today as requested to do a Tier Exception. After going through the process with the representative, answering the questions, the claim was processed at while I was on the phone and request was denied.  The reason given was because it was a specialty medication.  I asked if I could appeal the outcome and was told no there was not appeal process.  The patient will be sent a letter with the determination and I will be fax a copy to be put in the patient's record.  I have informed Carl the Pharmacy Patient Assistance person who was working on helping the patient getting assistance, to let him know that this was not an option.

## 2023-04-26 NOTE — CONSULTS
Patient evaluated by and admitted to Critical Care Medicine. Full H&P to follow.    Abena Beth NP  Critical Care Medicine     Consent was obtained from the patient. The risks and benefits to therapy were discussed in detail. Specifically, the risks of infection, scarring, bleeding, prolonged wound healing, incomplete removal, allergy to anesthesia, nerve injury and recurrence were addressed. Prior to the procedure, the treatment site was clearly identified and confirmed by the patient.

## 2023-04-26 NOTE — TELEPHONE ENCOUNTER
A 2nd attempt has been made to retrieve requested documents from Elda Hernandez  via MY CHART. The final contact attempt will be made in 5 business days

## 2023-05-10 ENCOUNTER — PATIENT MESSAGE (OUTPATIENT)
Dept: TRANSPLANT | Facility: CLINIC | Age: 53
End: 2023-05-10
Payer: MEDICARE

## 2023-05-12 NOTE — TELEPHONE ENCOUNTER
Elda Hernandez was scheduled to provide necessary documents to start the application process by 04/27/23 . As of today, the following documents have not been received.        Proof of household Income( such as social security statement, 1099 form, pension statement or 3 consecutive pay stubs, Copy of all Insurance cards( front and back), Printout from your Insurance or Pharmacy that shows how much you have spent on prescriptions this year, and Signed & dated OCCP/ Patient Authorization Forms    Please instruct the patient to reach out to Carl Fritz [phone] or  [email or web address] if (he/she) is still in need of assistance.

## 2023-05-13 ENCOUNTER — LAB VISIT (OUTPATIENT)
Dept: LAB | Facility: HOSPITAL | Age: 53
End: 2023-05-13
Attending: INTERNAL MEDICINE
Payer: MEDICARE

## 2023-05-13 DIAGNOSIS — Z94.4 LIVER REPLACED BY TRANSPLANT: ICD-10-CM

## 2023-05-13 LAB
ALBUMIN SERPL BCP-MCNC: 2.7 G/DL (ref 3.5–5.2)
ALP SERPL-CCNC: 803 U/L (ref 55–135)
ALT SERPL W/O P-5'-P-CCNC: 54 U/L (ref 10–44)
ANION GAP SERPL CALC-SCNC: 9 MMOL/L (ref 8–16)
AST SERPL-CCNC: 108 U/L (ref 10–40)
BASOPHILS # BLD AUTO: 0.05 K/UL (ref 0–0.2)
BASOPHILS NFR BLD: 1.5 % (ref 0–1.9)
BILIRUB SERPL-MCNC: 2.2 MG/DL (ref 0.1–1)
BUN SERPL-MCNC: 15 MG/DL (ref 6–20)
CALCIUM SERPL-MCNC: 9.2 MG/DL (ref 8.7–10.5)
CHLORIDE SERPL-SCNC: 107 MMOL/L (ref 95–110)
CO2 SERPL-SCNC: 25 MMOL/L (ref 23–29)
CREAT SERPL-MCNC: 0.9 MG/DL (ref 0.5–1.4)
DIFFERENTIAL METHOD: ABNORMAL
EOSINOPHIL # BLD AUTO: 0.4 K/UL (ref 0–0.5)
EOSINOPHIL NFR BLD: 10.8 % (ref 0–8)
ERYTHROCYTE [DISTWIDTH] IN BLOOD BY AUTOMATED COUNT: 14.8 % (ref 11.5–14.5)
EST. GFR  (NO RACE VARIABLE): >60 ML/MIN/1.73 M^2
GLUCOSE SERPL-MCNC: 95 MG/DL (ref 70–110)
HCT VFR BLD AUTO: 36.3 % (ref 37–48.5)
HGB BLD-MCNC: 11.9 G/DL (ref 12–16)
IMM GRANULOCYTES # BLD AUTO: 0.01 K/UL (ref 0–0.04)
IMM GRANULOCYTES NFR BLD AUTO: 0.3 % (ref 0–0.5)
INR PPP: 1.2 (ref 0.8–1.2)
LYMPHOCYTES # BLD AUTO: 0.8 K/UL (ref 1–4.8)
LYMPHOCYTES NFR BLD: 24.9 % (ref 18–48)
MCH RBC QN AUTO: 29.9 PG (ref 27–31)
MCHC RBC AUTO-ENTMCNC: 32.8 G/DL (ref 32–36)
MCV RBC AUTO: 91 FL (ref 82–98)
MONOCYTES # BLD AUTO: 0.4 K/UL (ref 0.3–1)
MONOCYTES NFR BLD: 12.6 % (ref 4–15)
NEUTROPHILS # BLD AUTO: 1.6 K/UL (ref 1.8–7.7)
NEUTROPHILS NFR BLD: 49.9 % (ref 38–73)
NRBC BLD-RTO: 0 /100 WBC
PLATELET # BLD AUTO: 82 K/UL (ref 150–450)
PMV BLD AUTO: 12.5 FL (ref 9.2–12.9)
POTASSIUM SERPL-SCNC: 3.8 MMOL/L (ref 3.5–5.1)
PROT SERPL-MCNC: 6.2 G/DL (ref 6–8.4)
PROTHROMBIN TIME: 12.5 SEC (ref 9–12.5)
RBC # BLD AUTO: 3.98 M/UL (ref 4–5.4)
SODIUM SERPL-SCNC: 141 MMOL/L (ref 136–145)
WBC # BLD AUTO: 3.25 K/UL (ref 3.9–12.7)

## 2023-05-13 PROCEDURE — 80053 COMPREHEN METABOLIC PANEL: CPT | Performed by: INTERNAL MEDICINE

## 2023-05-13 PROCEDURE — 80197 ASSAY OF TACROLIMUS: CPT | Performed by: INTERNAL MEDICINE

## 2023-05-13 PROCEDURE — 85610 PROTHROMBIN TIME: CPT | Mod: PO | Performed by: INTERNAL MEDICINE

## 2023-05-13 PROCEDURE — 36415 COLL VENOUS BLD VENIPUNCTURE: CPT | Mod: PO | Performed by: INTERNAL MEDICINE

## 2023-05-13 PROCEDURE — 85025 COMPLETE CBC W/AUTO DIFF WBC: CPT | Performed by: INTERNAL MEDICINE

## 2023-05-14 LAB — TACROLIMUS BLD-MCNC: 2.1 NG/ML (ref 5–15)

## 2023-05-16 ENCOUNTER — TELEPHONE (OUTPATIENT)
Dept: TRANSPLANT | Facility: CLINIC | Age: 53
End: 2023-05-16
Payer: MEDICARE

## 2023-05-16 ENCOUNTER — PATIENT MESSAGE (OUTPATIENT)
Dept: TRANSPLANT | Facility: CLINIC | Age: 53
End: 2023-05-16
Payer: MEDICARE

## 2023-05-16 DIAGNOSIS — C22.0 HEPATOCELLULAR CARCINOMA: Primary | ICD-10-CM

## 2023-05-16 NOTE — TELEPHONE ENCOUNTER
Sent message to patient to let about the change and to get labs on 5/20/23    ----- Message from Dominga Valle RN sent at 5/15/2023 11:15 AM CDT -----    ----- Message -----  From: Jaya Nielsen MD  Sent: 5/15/2023  10:42 AM CDT  To: [facility] Clinical Staff    Results reviewed.  Increase tacrolimus to 1 mg twice daily and repeat labs next week.

## 2023-05-20 ENCOUNTER — LAB VISIT (OUTPATIENT)
Dept: LAB | Facility: HOSPITAL | Age: 53
End: 2023-05-20
Attending: INTERNAL MEDICINE
Payer: MEDICARE

## 2023-05-20 DIAGNOSIS — Z94.4 LIVER REPLACED BY TRANSPLANT: ICD-10-CM

## 2023-05-20 LAB
ALBUMIN SERPL BCP-MCNC: 2.8 G/DL (ref 3.5–5.2)
ALP SERPL-CCNC: 708 U/L (ref 55–135)
ALT SERPL W/O P-5'-P-CCNC: 47 U/L (ref 10–44)
ANION GAP SERPL CALC-SCNC: 11 MMOL/L (ref 8–16)
AST SERPL-CCNC: 80 U/L (ref 10–40)
BASOPHILS # BLD AUTO: 0.05 K/UL (ref 0–0.2)
BASOPHILS NFR BLD: 1.4 % (ref 0–1.9)
BILIRUB SERPL-MCNC: 2.2 MG/DL (ref 0.1–1)
BUN SERPL-MCNC: 16 MG/DL (ref 6–20)
CALCIUM SERPL-MCNC: 8.7 MG/DL (ref 8.7–10.5)
CHLORIDE SERPL-SCNC: 103 MMOL/L (ref 95–110)
CO2 SERPL-SCNC: 21 MMOL/L (ref 23–29)
CREAT SERPL-MCNC: 1 MG/DL (ref 0.5–1.4)
DIFFERENTIAL METHOD: ABNORMAL
EOSINOPHIL # BLD AUTO: 0.5 K/UL (ref 0–0.5)
EOSINOPHIL NFR BLD: 12.3 % (ref 0–8)
ERYTHROCYTE [DISTWIDTH] IN BLOOD BY AUTOMATED COUNT: 15.3 % (ref 11.5–14.5)
EST. GFR  (NO RACE VARIABLE): >60 ML/MIN/1.73 M^2
GLUCOSE SERPL-MCNC: 75 MG/DL (ref 70–110)
HCT VFR BLD AUTO: 36.9 % (ref 37–48.5)
HGB BLD-MCNC: 11.7 G/DL (ref 12–16)
IMM GRANULOCYTES # BLD AUTO: 0.01 K/UL (ref 0–0.04)
IMM GRANULOCYTES NFR BLD AUTO: 0.3 % (ref 0–0.5)
INR PPP: 1.3 (ref 0.8–1.2)
LYMPHOCYTES # BLD AUTO: 0.8 K/UL (ref 1–4.8)
LYMPHOCYTES NFR BLD: 22.9 % (ref 18–48)
MCH RBC QN AUTO: 29.6 PG (ref 27–31)
MCHC RBC AUTO-ENTMCNC: 31.7 G/DL (ref 32–36)
MCV RBC AUTO: 93 FL (ref 82–98)
MONOCYTES # BLD AUTO: 0.5 K/UL (ref 0.3–1)
MONOCYTES NFR BLD: 13.4 % (ref 4–15)
NEUTROPHILS # BLD AUTO: 1.8 K/UL (ref 1.8–7.7)
NEUTROPHILS NFR BLD: 49.7 % (ref 38–73)
NRBC BLD-RTO: 0 /100 WBC
PLATELET # BLD AUTO: 67 K/UL (ref 150–450)
PMV BLD AUTO: 13.1 FL (ref 9.2–12.9)
POTASSIUM SERPL-SCNC: 3.2 MMOL/L (ref 3.5–5.1)
PROT SERPL-MCNC: 6 G/DL (ref 6–8.4)
PROTHROMBIN TIME: 13.5 SEC (ref 9–12.5)
RBC # BLD AUTO: 3.95 M/UL (ref 4–5.4)
SODIUM SERPL-SCNC: 135 MMOL/L (ref 136–145)
WBC # BLD AUTO: 3.67 K/UL (ref 3.9–12.7)

## 2023-05-20 PROCEDURE — 85025 COMPLETE CBC W/AUTO DIFF WBC: CPT | Performed by: INTERNAL MEDICINE

## 2023-05-20 PROCEDURE — 80053 COMPREHEN METABOLIC PANEL: CPT | Performed by: INTERNAL MEDICINE

## 2023-05-20 PROCEDURE — 36415 COLL VENOUS BLD VENIPUNCTURE: CPT | Mod: PO | Performed by: INTERNAL MEDICINE

## 2023-05-20 PROCEDURE — 85610 PROTHROMBIN TIME: CPT | Performed by: INTERNAL MEDICINE

## 2023-05-20 PROCEDURE — 80197 ASSAY OF TACROLIMUS: CPT | Performed by: INTERNAL MEDICINE

## 2023-05-21 LAB — TACROLIMUS BLD-MCNC: 8.5 NG/ML (ref 5–15)

## 2023-05-22 ENCOUNTER — PATIENT MESSAGE (OUTPATIENT)
Dept: TRANSPLANT | Facility: CLINIC | Age: 53
End: 2023-05-22
Payer: MEDICARE

## 2023-05-22 DIAGNOSIS — K74.60 CIRRHOSIS OF LIVER WITH ASCITES, UNSPECIFIED HEPATIC CIRRHOSIS TYPE: ICD-10-CM

## 2023-05-22 DIAGNOSIS — R18.8 CIRRHOSIS OF LIVER WITH ASCITES, UNSPECIFIED HEPATIC CIRRHOSIS TYPE: ICD-10-CM

## 2023-05-22 NOTE — TELEPHONE ENCOUNTER
Sent message to patient with change and to get labs on 6/17/23    ----- Message from Jaya Nielsen MD sent at 5/22/2023  2:02 PM CDT -----  Reduce furosemide from 40 mg to 20 mg daily.  ----- Message -----  From: Maude Chau RN  Sent: 5/21/2023   8:13 PM CDT  To: Jaya Nielsen MD    Please clarify medication change?  ----- Message -----  From: Jaya Nielsen MD  Sent: 5/21/2023   5:43 PM CDT  To: [facility]    Results reviewed. Reduce tacrolimus to 40 mg daily.

## 2023-05-23 ENCOUNTER — OFFICE VISIT (OUTPATIENT)
Dept: TRANSPLANT | Facility: CLINIC | Age: 53
End: 2023-05-23
Attending: INTERNAL MEDICINE
Payer: MEDICARE

## 2023-05-23 VITALS
WEIGHT: 120.13 LBS | TEMPERATURE: 97 F | RESPIRATION RATE: 18 BRPM | DIASTOLIC BLOOD PRESSURE: 63 MMHG | BODY MASS INDEX: 24.22 KG/M2 | SYSTOLIC BLOOD PRESSURE: 139 MMHG | HEIGHT: 59 IN | OXYGEN SATURATION: 97 % | HEART RATE: 68 BPM

## 2023-05-23 DIAGNOSIS — Z94.4 LIVER REPLACED BY TRANSPLANT: ICD-10-CM

## 2023-05-23 DIAGNOSIS — Z94.4 S/P LIVER TRANSPLANT: Primary | Chronic | ICD-10-CM

## 2023-05-23 DIAGNOSIS — R18.8 OTHER ASCITES: ICD-10-CM

## 2023-05-23 PROCEDURE — 99213 OFFICE O/P EST LOW 20 MIN: CPT | Mod: S$GLB,,, | Performed by: INTERNAL MEDICINE

## 2023-05-23 PROCEDURE — 99999 PR PBB SHADOW E&M-EST. PATIENT-LVL V: CPT | Mod: PBBFAC,,, | Performed by: INTERNAL MEDICINE

## 2023-05-23 PROCEDURE — 99999 PR PBB SHADOW E&M-EST. PATIENT-LVL V: ICD-10-PCS | Mod: PBBFAC,,, | Performed by: INTERNAL MEDICINE

## 2023-05-23 PROCEDURE — 4010F PR ACE/ARB THEARPY RXD/TAKEN: ICD-10-PCS | Mod: CPTII,S$GLB,, | Performed by: INTERNAL MEDICINE

## 2023-05-23 PROCEDURE — 99213 PR OFFICE/OUTPT VISIT, EST, LEVL III, 20-29 MIN: ICD-10-PCS | Mod: S$GLB,,, | Performed by: INTERNAL MEDICINE

## 2023-05-23 PROCEDURE — 4010F ACE/ARB THERAPY RXD/TAKEN: CPT | Mod: CPTII,S$GLB,, | Performed by: INTERNAL MEDICINE

## 2023-05-23 RX ORDER — LACTULOSE 10 G/15ML
10 SOLUTION ORAL; RECTAL 3 TIMES DAILY
Qty: 1892 ML | Refills: 6 | Status: ON HOLD | OUTPATIENT
Start: 2023-05-23 | End: 2024-01-10

## 2023-05-23 RX ORDER — TACROLIMUS 0.5 MG/1
1 CAPSULE ORAL EVERY 12 HOURS
Qty: 180 CAPSULE | Refills: 3 | Status: SHIPPED | OUTPATIENT
Start: 2023-05-23 | End: 2023-07-21 | Stop reason: SDUPTHER

## 2023-05-23 RX ORDER — FUROSEMIDE 20 MG/1
20 TABLET ORAL DAILY
Qty: 90 TABLET | Refills: 3 | Status: SHIPPED | OUTPATIENT
Start: 2023-05-23 | End: 2023-05-23 | Stop reason: SDUPTHER

## 2023-05-23 RX ORDER — SPIRONOLACTONE 50 MG/1
50 TABLET, FILM COATED ORAL DAILY
Qty: 30 TABLET | Refills: 11 | Status: SHIPPED | OUTPATIENT
Start: 2023-05-23 | End: 2023-09-22

## 2023-05-23 RX ORDER — FUROSEMIDE 20 MG/1
20 TABLET ORAL DAILY
Qty: 90 TABLET | Refills: 3 | Status: SHIPPED | OUTPATIENT
Start: 2023-05-23 | End: 2023-09-22

## 2023-05-23 NOTE — Clinical Note
Patient has moved to [place].  Can we get her blood work and labs done at [place] rather than [place]?  Can we arrange a ultrasound guided paracentesis.  I have updated her tacrolimus in added spironolactone.

## 2023-05-23 NOTE — LETTER
May 23, 2023        Jordana Woods  [address]  [place] [address]  Phone: [phone]  Fax: [phone]             [facility]  [address]  [facility] [address]  Phone: [phone]   Patient: Elda Hernandez   MR Number: [id number]   YOB: 1970   Date of Visit: 5/23/2023       Dear Dr. Jordana Woods    Thank you for referring Elda Hernandez to me for evaluation. Attached you will find relevant portions of my assessment and plan of care.    If you have questions, please do not hesitate to call me. I look forward to following Elda Hernandez along with you.    Sincerely,    Jaya Nielsen MD    Enclosure    If you would like to receive this communication electronically, please contact [email or web address] or [phone] to request [employer] access.    [employer] is a tool which provides read-only access to select patient information with whom you have a relationship. Its easy to use and provides real time access to review your patients record including encounter summaries, notes, results, and demographic information.    If you feel you have received this communication in error or would no longer like to receive these types of communications, please e-mail [email or web address]

## 2023-05-23 NOTE — PROGRESS NOTES
Transplant Hepatology  Liver Transplant Recipient Follow-up    Transplant Date: 9/23/2002  UNOS Native Liver Dx: METDIS: Glycogen Storage Disease Type I (GSD-I)    Elda Doran is here for follow up of her liver transplant.    ORGAN: LIVER  Whole or Partial: whole liver  Donor Type:   CDC High Risk:   Donor CMV Status: positive  Donor HCV Status: negative  Donor HBcAb: negative  Biliary Anastomosis:   Arterial Anatomy:   IVC reconstruction:   Portal vein status:       She has had the following complications since transplant: biliary stricture and chronic rejection. The noted complications needs to be addressed more thoroughly today.    Subjective:     Interval History:   This 51-year-old woman is 21 years status post liver transplant for glycogen storage disease.  She has had both a biliary stricture and chronic rejection with what appears to be cirrhosis posttransplant.  She had lengthy admission for perforated viscus following a gastric outlet obstruction several years ago.  She now appears to have cirrhosis, portal hypertension and ascites possibly secondary to chronic rejection.  She developed hypokalemia necessitating a reduction in her dose of Lasix.  I will be starting her on spironolactone 50 mg daily in addition to Lasix 20 mg daily.  I will try and arrange a large volume paracentesis.  She also has some degree of hepatic encephalopathy.  I have counseled her on titrating the dose of lactulose 3 loose bowel movements daily.  She discussed the possibility retransplantation.  Her meld score is not particularly high and given her multiple abdominal surgeries I think the risk would be prohibitive.  But I am willing to bring this up with our surgical colleagues should her MELD score rise.  .Review of Systems   Constitutional:  Positive for appetite change and fatigue. Negative for unexpected weight change.   HENT:  Negative for ear pain, hearing loss, sore throat and trouble swallowing.    Eyes:  Negative for  visual disturbance.   Respiratory:  Negative for cough and shortness of breath.    Cardiovascular:  Negative for chest pain and palpitations.   Gastrointestinal:  Positive for abdominal distention. Negative for abdominal pain, nausea and vomiting.   Genitourinary:  Negative for difficulty urinating and dysuria.   Musculoskeletal:  Negative for arthralgias.   Skin:  Negative for rash.             Neurological:  Negative for tremors, seizures and headaches.   Psychiatric/Behavioral:  Negative for agitation and decreased concentration.      Objective:     Physical Exam  Constitutional:       Appearance: She is well-developed. She is not diaphoretic.   HENT:      Right Ear: External ear normal.      Left Ear: External ear normal.   Eyes:      General: No scleral icterus.  Cardiovascular:      Rate and Rhythm: Normal rate and regular rhythm.      Heart sounds: Normal heart sounds. No murmur heard.    No friction rub. No gallop.   Pulmonary:      Effort: Pulmonary effort is normal. No respiratory distress.      Breath sounds: Normal breath sounds. No wheezing.   Abdominal:      General: Bowel sounds are normal. There is no distension.      Palpations: Abdomen is soft. There is no mass.      Tenderness: There is no abdominal tenderness.         Musculoskeletal:         General: Normal range of motion.   Lymphadenopathy:      Cervical: No cervical adenopathy.   Skin:     General: Skin is warm and dry.      Coloration: Skin is not pale.   Neurological:      Mental Status: She is alert and oriented to person, place, and time.     Lab Results   Component Value Date    BILITOT 2.2 (H) 05/20/2023    AST 80 (H) 05/20/2023    ALT 47 (H) 05/20/2023    ALKPHOS 708 (H) 05/20/2023    CREATININE 1.0 05/20/2023    ALBUMIN 2.8 (L) 05/20/2023     Lab Results   Component Value Date    WBC 3.67 (L) 05/20/2023    HGB 11.7 (L) 05/20/2023    HCT 36.9 (L) 05/20/2023    HCT 32 (L) 02/28/2023    PLT 67 (L) 05/20/2023     Lab Results   Component  Value Date    TACROLIMUS 8.5 05/20/2023       Assessment/Plan:     1. 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 Other ascites    3. Liver replaced by transplant        I will continue her on low-dose tacrolimus based monotherapy 1 mg twice daily.  I have added spironolactone 50 mg daily to her current dose of Lasix 20 mg daily.  I will try and arrange a large volume paracentesis.   She will continue to require surveillance ultrasounds every 6 months.  She will return to clinic in 1 year's time.    Jaya Nielsen MD           [facility] Patient Status  Functional Status: 90%  Physical Capacity: No Limitations

## 2023-05-24 ENCOUNTER — PATIENT MESSAGE (OUTPATIENT)
Dept: TRANSPLANT | Facility: CLINIC | Age: 53
End: 2023-05-24
Payer: MEDICARE

## 2023-05-26 DIAGNOSIS — Z94.4 LIVER REPLACED BY TRANSPLANT: Primary | ICD-10-CM

## 2023-05-30 ENCOUNTER — PATIENT MESSAGE (OUTPATIENT)
Dept: FAMILY MEDICINE | Facility: CLINIC | Age: 53
End: 2023-05-30
Payer: MEDICARE

## 2023-05-31 ENCOUNTER — TELEPHONE (OUTPATIENT)
Dept: TRANSPLANT | Facility: CLINIC | Age: 53
End: 2023-05-31
Payer: MEDICARE

## 2023-05-31 ENCOUNTER — TELEPHONE (OUTPATIENT)
Dept: FAMILY MEDICINE | Facility: CLINIC | Age: 53
End: 2023-05-31
Payer: MEDICARE

## 2023-05-31 NOTE — TELEPHONE ENCOUNTER
Spoke with patient  in regards to her disability application. Informed  that her papers are ready for .

## 2023-06-01 ENCOUNTER — TELEPHONE (OUTPATIENT)
Dept: TRANSPLANT | Facility: CLINIC | Age: 53
End: 2023-06-01
Payer: MEDICARE

## 2023-06-01 DIAGNOSIS — Z94.4 LIVER REPLACED BY TRANSPLANT: Primary | ICD-10-CM

## 2023-06-01 NOTE — TELEPHONE ENCOUNTER
Called pt to notify that order has been entered for paracentesis in [place].  No answer.  Left voice message for callback.

## 2023-06-05 PROBLEM — N17.9 AKI (ACUTE KIDNEY INJURY): Status: RESOLVED | Noted: 2020-09-05 | Resolved: 2023-06-05

## 2023-06-17 ENCOUNTER — LAB VISIT (OUTPATIENT)
Dept: LAB | Facility: HOSPITAL | Age: 53
End: 2023-06-17
Attending: INTERNAL MEDICINE
Payer: MEDICARE

## 2023-06-17 DIAGNOSIS — Z94.4 LIVER REPLACED BY TRANSPLANT: ICD-10-CM

## 2023-06-17 LAB
ALBUMIN SERPL BCP-MCNC: 2.5 G/DL (ref 3.5–5.2)
ALP SERPL-CCNC: 665 U/L (ref 55–135)
ALT SERPL W/O P-5'-P-CCNC: 38 U/L (ref 10–44)
ANION GAP SERPL CALC-SCNC: 8 MMOL/L (ref 8–16)
AST SERPL-CCNC: 68 U/L (ref 10–40)
BASOPHILS # BLD AUTO: 0.04 K/UL (ref 0–0.2)
BASOPHILS NFR BLD: 1.3 % (ref 0–1.9)
BILIRUB SERPL-MCNC: 2.8 MG/DL (ref 0.1–1)
BUN SERPL-MCNC: 13 MG/DL (ref 6–20)
CALCIUM SERPL-MCNC: 8.9 MG/DL (ref 8.7–10.5)
CHLORIDE SERPL-SCNC: 109 MMOL/L (ref 95–110)
CO2 SERPL-SCNC: 26 MMOL/L (ref 23–29)
CREAT SERPL-MCNC: 0.8 MG/DL (ref 0.5–1.4)
DIFFERENTIAL METHOD: ABNORMAL
EOSINOPHIL # BLD AUTO: 0.3 K/UL (ref 0–0.5)
EOSINOPHIL NFR BLD: 9.8 % (ref 0–8)
ERYTHROCYTE [DISTWIDTH] IN BLOOD BY AUTOMATED COUNT: 15.3 % (ref 11.5–14.5)
EST. GFR  (NO RACE VARIABLE): >60 ML/MIN/1.73 M^2
GLUCOSE SERPL-MCNC: 75 MG/DL (ref 70–110)
HCT VFR BLD AUTO: 34.8 % (ref 37–48.5)
HGB BLD-MCNC: 11.5 G/DL (ref 12–16)
IMM GRANULOCYTES # BLD AUTO: 0.01 K/UL (ref 0–0.04)
IMM GRANULOCYTES NFR BLD AUTO: 0.3 % (ref 0–0.5)
LYMPHOCYTES # BLD AUTO: 0.9 K/UL (ref 1–4.8)
LYMPHOCYTES NFR BLD: 28.6 % (ref 18–48)
MCH RBC QN AUTO: 29.4 PG (ref 27–31)
MCHC RBC AUTO-ENTMCNC: 33 G/DL (ref 32–36)
MCV RBC AUTO: 89 FL (ref 82–98)
MONOCYTES # BLD AUTO: 0.3 K/UL (ref 0.3–1)
MONOCYTES NFR BLD: 10.8 % (ref 4–15)
NEUTROPHILS # BLD AUTO: 1.5 K/UL (ref 1.8–7.7)
NEUTROPHILS NFR BLD: 49.2 % (ref 38–73)
NRBC BLD-RTO: 0 /100 WBC
PLATELET # BLD AUTO: 72 K/UL (ref 150–450)
PMV BLD AUTO: 12.5 FL (ref 9.2–12.9)
POTASSIUM SERPL-SCNC: 3.7 MMOL/L (ref 3.5–5.1)
PROT SERPL-MCNC: 6.2 G/DL (ref 6–8.4)
RBC # BLD AUTO: 3.91 M/UL (ref 4–5.4)
SODIUM SERPL-SCNC: 143 MMOL/L (ref 136–145)
WBC # BLD AUTO: 2.97 K/UL (ref 3.9–12.7)

## 2023-06-17 PROCEDURE — 80197 ASSAY OF TACROLIMUS: CPT | Performed by: INTERNAL MEDICINE

## 2023-06-17 PROCEDURE — 85025 COMPLETE CBC W/AUTO DIFF WBC: CPT | Performed by: INTERNAL MEDICINE

## 2023-06-17 PROCEDURE — 36415 COLL VENOUS BLD VENIPUNCTURE: CPT | Mod: PO | Performed by: INTERNAL MEDICINE

## 2023-06-17 PROCEDURE — 80053 COMPREHEN METABOLIC PANEL: CPT | Performed by: INTERNAL MEDICINE

## 2023-06-18 LAB — TACROLIMUS BLD-MCNC: 13.2 NG/ML (ref 5–15)

## 2023-06-19 ENCOUNTER — TELEPHONE (OUTPATIENT)
Dept: TRANSPLANT | Facility: CLINIC | Age: 53
End: 2023-06-19
Payer: MEDICARE

## 2023-06-19 DIAGNOSIS — Z94.4 LIVER REPLACED BY TRANSPLANT: Primary | ICD-10-CM

## 2023-06-19 DIAGNOSIS — K74.60 CIRRHOSIS OF LIVER WITH ASCITES, UNSPECIFIED HEPATIC CIRRHOSIS TYPE: ICD-10-CM

## 2023-06-19 DIAGNOSIS — R18.8 CIRRHOSIS OF LIVER WITH ASCITES, UNSPECIFIED HEPATIC CIRRHOSIS TYPE: ICD-10-CM

## 2023-06-19 NOTE — TELEPHONE ENCOUNTER
Labs reviewed and are stable, continue q 4 weeks. Letter sent.       ----- Message from Jaya Nielsen MD sent at 6/19/2023  9:03 AM CDT -----  Results reviewed. No action.

## 2023-06-22 DIAGNOSIS — G47.01 INSOMNIA DUE TO MEDICAL CONDITION: ICD-10-CM

## 2023-06-22 NOTE — TELEPHONE ENCOUNTER
----- Message from Lilian Ferrera sent at 6/22/2023  2:24 PM CDT -----  Contact: Self  Type:  RX Refill Request    Who Called:  Patient  Refill or New Rx:  New  RX Name and Strength:  butabital-acetaminophen-caffeine (not on chart)  clonazePAM (KLONOPIN) 0.5 MG tablet  How is the patient currently taking it? (ex. 1XDay):  as directed  Is this a 30 day or 90 day RX:  30  Preferred Pharmacy with phone number:    [id number] - [place], [place] - [address] AT [facility] [address] & [facility] [address]  [address]  [place] [address]  Phone: [phone] Fax: [phone]      Local or Mail Order:  local  Ordering Provider:  Maura Trent Call Back Number:  [phone]  Additional Information:  States moved to [place], needs new script called in

## 2023-06-22 NOTE — TELEPHONE ENCOUNTER
No care due was identified.  [facility] Embedded Care Due Messages. Reference number: [id number].   6/22/2023 2:49:06 PM CDT

## 2023-06-23 ENCOUNTER — TELEPHONE (OUTPATIENT)
Dept: FAMILY MEDICINE | Facility: CLINIC | Age: 53
End: 2023-06-23
Payer: MEDICARE

## 2023-06-23 DIAGNOSIS — Z78.0 ASYMPTOMATIC MENOPAUSAL STATE: Primary | ICD-10-CM

## 2023-06-23 DIAGNOSIS — Z12.31 ENCOUNTER FOR SCREENING MAMMOGRAM FOR BREAST CANCER: Primary | ICD-10-CM

## 2023-06-23 NOTE — TELEPHONE ENCOUNTER
----- Message from Denae Santiago, Patient Care Assistant sent at 6/23/2023 12:52 PM CDT -----  Regarding: advice  Contact: pt  Type:  RX Refill Request    Who Called:  pt   Refill or New Rx:  refill  RX Name and Strength:  clonazePAM (KLONOPIN) 0.5 MG tablet  How is the patient currently taking it?1xday   Is this a 30 day or 90 day RX:  30     Preferred Pharmacy with phone number:    [id number] - [place], [place] - [address] AT [facility] & [facility] [address]  [address]  [place] [address]  Phone: [phone] Fax: [phone]     Local or Mail Order:  local   Ordering Provider:  Maura Trent Call Back Number:  [phone] (home)     Additional Information:  please call pt to advise. Thanks!

## 2023-06-23 NOTE — TELEPHONE ENCOUNTER
----- Message from Denae Santiago, Patient Care Assistant sent at 6/23/2023 12:52 PM CDT -----  Regarding: advice  Contact: pt  Type:  RX Refill Request    Who Called:  pt   Refill or New Rx:  refill  RX Name and Strength:  clonazePAM (KLONOPIN) 0.5 MG tablet  How is the patient currently taking it?1xday   Is this a 30 day or 90 day RX:  30     Preferred Pharmacy with phone number:    [facility] #[id number] - [place], [place] - [address] AT [facility] & [facility] [address]  [address]  [place] [address]  Phone: [phone] Fax: [phone]     Local or Mail Order:  local   Ordering Provider:  Maura Trent Call Back Number:  [phone] (home)     Additional Information:  please call pt to advise. Thanks!

## 2023-06-23 NOTE — TELEPHONE ENCOUNTER
Patient is requesting refills to [facility] in [place]. She moved there recently.   17-Apr-2021 21:06

## 2023-06-23 NOTE — TELEPHONE ENCOUNTER
Patient request DEXA scan and neck ultrasound to [facility]. Orders were faxed to them.

## 2023-06-24 RX ORDER — CLONAZEPAM 0.5 MG/1
0.5 TABLET ORAL NIGHTLY
Qty: 60 TABLET | Refills: 1 | Status: SHIPPED | OUTPATIENT
Start: 2023-06-24 | End: 2023-08-09 | Stop reason: SDUPTHER

## 2023-06-24 RX ORDER — BUTALBITAL AND ACETAMINOPHEN 325; 50 MG/1; MG/1
1 TABLET ORAL 2 TIMES DAILY PRN
Qty: 60 TABLET | Refills: 0 | Status: SHIPPED | OUTPATIENT
Start: 2023-06-24 | End: 2023-07-20 | Stop reason: SDUPTHER

## 2023-07-20 DIAGNOSIS — R11.0 NAUSEA: ICD-10-CM

## 2023-07-21 ENCOUNTER — PATIENT MESSAGE (OUTPATIENT)
Dept: TRANSPLANT | Facility: CLINIC | Age: 53
End: 2023-07-21
Payer: MEDICARE

## 2023-07-21 DIAGNOSIS — R18.8 OTHER ASCITES: ICD-10-CM

## 2023-07-21 DIAGNOSIS — Z94.4 LIVER REPLACED BY TRANSPLANT: ICD-10-CM

## 2023-07-21 DIAGNOSIS — Z94.4 S/P LIVER TRANSPLANT: Chronic | ICD-10-CM

## 2023-07-21 RX ORDER — BUTALBITAL AND ACETAMINOPHEN 325; 50 MG/1; MG/1
1 TABLET ORAL 2 TIMES DAILY PRN
Qty: 30 TABLET | Refills: 0 | Status: SHIPPED | OUTPATIENT
Start: 2023-07-21 | End: 2023-08-09 | Stop reason: SDUPTHER

## 2023-07-21 RX ORDER — PROMETHAZINE HYDROCHLORIDE 25 MG/1
25 TABLET ORAL EVERY 6 HOURS PRN
Qty: 20 TABLET | Refills: 0 | Status: SHIPPED | OUTPATIENT
Start: 2023-07-21 | End: 2023-08-11 | Stop reason: SDUPTHER

## 2023-07-21 RX ORDER — TACROLIMUS 0.5 MG/1
1 CAPSULE ORAL EVERY 12 HOURS
Qty: 180 CAPSULE | Refills: 3 | Status: SHIPPED | OUTPATIENT
Start: 2023-07-21 | End: 2023-09-28 | Stop reason: SDUPTHER

## 2023-07-21 NOTE — TELEPHONE ENCOUNTER
No care due was identified.  [facility] Embedded Care Due Messages. Reference number: [id number].   7/20/2023 9:47:58 PM CDT

## 2023-07-21 NOTE — TELEPHONE ENCOUNTER
No care due was identified.  [facility] Embedded Care Due Messages. Reference number: [id number].   7/20/2023 9:49:12 PM CDT

## 2023-07-22 ENCOUNTER — LAB VISIT (OUTPATIENT)
Dept: LAB | Facility: HOSPITAL | Age: 53
End: 2023-07-22
Attending: INTERNAL MEDICINE
Payer: MEDICARE

## 2023-07-22 DIAGNOSIS — Z94.4 LIVER REPLACED BY TRANSPLANT: ICD-10-CM

## 2023-07-22 LAB
ALBUMIN SERPL BCP-MCNC: 3 G/DL (ref 3.5–5.2)
ALP SERPL-CCNC: 589 U/L (ref 55–135)
ALT SERPL W/O P-5'-P-CCNC: 45 U/L (ref 10–44)
ANION GAP SERPL CALC-SCNC: 7 MMOL/L (ref 8–16)
AST SERPL-CCNC: 65 U/L (ref 10–40)
BASOPHILS # BLD AUTO: 0.04 K/UL (ref 0–0.2)
BASOPHILS NFR BLD: 1.1 % (ref 0–1.9)
BILIRUB SERPL-MCNC: 2.9 MG/DL (ref 0.1–1)
BUN SERPL-MCNC: 30 MG/DL (ref 6–20)
CALCIUM SERPL-MCNC: 9.2 MG/DL (ref 8.7–10.5)
CHLORIDE SERPL-SCNC: 105 MMOL/L (ref 95–110)
CO2 SERPL-SCNC: 20 MMOL/L (ref 23–29)
CREAT SERPL-MCNC: 1.6 MG/DL (ref 0.5–1.4)
DIFFERENTIAL METHOD: ABNORMAL
EOSINOPHIL # BLD AUTO: 0.3 K/UL (ref 0–0.5)
EOSINOPHIL NFR BLD: 7.4 % (ref 0–8)
ERYTHROCYTE [DISTWIDTH] IN BLOOD BY AUTOMATED COUNT: 15.3 % (ref 11.5–14.5)
EST. GFR  (NO RACE VARIABLE): 37.9 ML/MIN/1.73 M^2
GLUCOSE SERPL-MCNC: 83 MG/DL (ref 70–110)
HCT VFR BLD AUTO: 36.7 % (ref 37–48.5)
HGB BLD-MCNC: 11.9 G/DL (ref 12–16)
IMM GRANULOCYTES # BLD AUTO: 0.01 K/UL (ref 0–0.04)
IMM GRANULOCYTES NFR BLD AUTO: 0.3 % (ref 0–0.5)
LYMPHOCYTES # BLD AUTO: 1 K/UL (ref 1–4.8)
LYMPHOCYTES NFR BLD: 26.1 % (ref 18–48)
MCH RBC QN AUTO: 29.2 PG (ref 27–31)
MCHC RBC AUTO-ENTMCNC: 32.4 G/DL (ref 32–36)
MCV RBC AUTO: 90 FL (ref 82–98)
MONOCYTES # BLD AUTO: 0.5 K/UL (ref 0.3–1)
MONOCYTES NFR BLD: 12.9 % (ref 4–15)
NEUTROPHILS # BLD AUTO: 2 K/UL (ref 1.8–7.7)
NEUTROPHILS NFR BLD: 52.2 % (ref 38–73)
NRBC BLD-RTO: 0 /100 WBC
PLATELET # BLD AUTO: 76 K/UL (ref 150–450)
PMV BLD AUTO: 11.9 FL (ref 9.2–12.9)
POTASSIUM SERPL-SCNC: 5.2 MMOL/L (ref 3.5–5.1)
PROT SERPL-MCNC: 7.1 G/DL (ref 6–8.4)
RBC # BLD AUTO: 4.07 M/UL (ref 4–5.4)
SODIUM SERPL-SCNC: 132 MMOL/L (ref 136–145)
WBC # BLD AUTO: 3.8 K/UL (ref 3.9–12.7)

## 2023-07-22 PROCEDURE — 85025 COMPLETE CBC W/AUTO DIFF WBC: CPT | Performed by: INTERNAL MEDICINE

## 2023-07-22 PROCEDURE — 80197 ASSAY OF TACROLIMUS: CPT | Performed by: INTERNAL MEDICINE

## 2023-07-22 PROCEDURE — 80053 COMPREHEN METABOLIC PANEL: CPT | Performed by: INTERNAL MEDICINE

## 2023-07-22 PROCEDURE — 36415 COLL VENOUS BLD VENIPUNCTURE: CPT | Mod: PO | Performed by: INTERNAL MEDICINE

## 2023-07-23 LAB — TACROLIMUS BLD-MCNC: 14.4 NG/ML (ref 5–15)

## 2023-07-24 ENCOUNTER — TELEPHONE (OUTPATIENT)
Dept: TRANSPLANT | Facility: CLINIC | Age: 53
End: 2023-07-24
Payer: MEDICARE

## 2023-07-24 ENCOUNTER — PATIENT MESSAGE (OUTPATIENT)
Dept: TRANSPLANT | Facility: CLINIC | Age: 53
End: 2023-07-24
Payer: MEDICARE

## 2023-07-24 NOTE — TELEPHONE ENCOUNTER
Sent patient message via portal to let her know labs are stable.  No medication changes, next labs due on 8/26/23      ----- Message from Jaya Nielsen MD sent at 7/24/2023  7:26 AM CDT -----  Results reviewed. No action.

## 2023-08-09 DIAGNOSIS — G47.01 INSOMNIA DUE TO MEDICAL CONDITION: ICD-10-CM

## 2023-08-09 NOTE — TELEPHONE ENCOUNTER
Patient came to [facility], she was asking if you can send her a new prescription for her kolonpin because you increased jair dose to twice daily. She is also needing a  refill on her butalbital. Medications pended.     She was also asking if you would consider taking over care of her medication prescribed by her cardiologist.  She is coming to see you next week and that can be discussed then.

## 2023-08-09 NOTE — TELEPHONE ENCOUNTER
No care due was identified.  [facility] Embedded Care Due Messages. Reference number: [id number].   8/09/2023 3:06:05 PM CDT

## 2023-08-10 RX ORDER — BUTALBITAL AND ACETAMINOPHEN 325; 50 MG/1; MG/1
1 TABLET ORAL 2 TIMES DAILY PRN
Qty: 30 TABLET | Refills: 0 | Status: SHIPPED | OUTPATIENT
Start: 2023-08-10 | End: 2023-09-02

## 2023-08-10 RX ORDER — CLONAZEPAM 0.5 MG/1
0.5 TABLET ORAL 2 TIMES DAILY
Qty: 60 TABLET | Refills: 2 | Status: SHIPPED | OUTPATIENT
Start: 2023-08-10 | End: 2023-08-11 | Stop reason: SDUPTHER

## 2023-08-11 DIAGNOSIS — G47.01 INSOMNIA DUE TO MEDICAL CONDITION: ICD-10-CM

## 2023-08-11 DIAGNOSIS — R18.8 OTHER ASCITES: ICD-10-CM

## 2023-08-11 DIAGNOSIS — I10 ESSENTIAL HYPERTENSION: ICD-10-CM

## 2023-08-11 DIAGNOSIS — Z94.4 S/P LIVER TRANSPLANT: Chronic | ICD-10-CM

## 2023-08-11 DIAGNOSIS — E03.4 HYPOTHYROIDISM DUE TO ACQUIRED ATROPHY OF THYROID: Chronic | ICD-10-CM

## 2023-08-11 DIAGNOSIS — R11.0 NAUSEA: ICD-10-CM

## 2023-08-11 DIAGNOSIS — F32.A DEPRESSION, UNSPECIFIED DEPRESSION TYPE: Primary | ICD-10-CM

## 2023-08-11 NOTE — TELEPHONE ENCOUNTER
----- Message from Lilian Ferrera sent at 8/11/2023  1:48 PM CDT -----  Contact: marianela  Type:  Pharmacy Calling to Clarify an RX    Name of Caller:  marianela  [facility]  Prescription Name:  see below  What do they need to clarify?:  need prescriptions on all of the following medications  Best Call Back Number:  [phone]  Additional Information:  clonazePAM (KLONOPIN) 0.5 MG tablet  venlafaxine (EFFEXOR-XR) 150 MG Cp24  ondansetron (ZOFRAN-ODT) 4 MG TbDL  pravastatin (PRAVACHOL) 10 MG tablet  ramipriL (ALTACE) 5 MG capsule  levothyroxine (SYNTHROID) 75 MCG tablet  magnesium oxide (MAG-OX) 400 mg (241.3 mg magnesium) tablet  promethazine (PHENERGAN) 25 MG tablet

## 2023-08-11 NOTE — TELEPHONE ENCOUNTER
No care due was identified.  [facility] Embedded Care Due Messages. Reference number: [id number].   8/11/2023 4:03:46 PM CDT

## 2023-08-13 RX ORDER — RAMIPRIL 5 MG/1
5 CAPSULE ORAL DAILY
Qty: 90 CAPSULE | Refills: 3 | Status: SHIPPED | OUTPATIENT
Start: 2023-08-13

## 2023-08-13 RX ORDER — CLONAZEPAM 0.5 MG/1
0.5 TABLET ORAL 2 TIMES DAILY
Qty: 60 TABLET | Refills: 2 | Status: SHIPPED | OUTPATIENT
Start: 2023-08-13 | End: 2024-01-25 | Stop reason: SDUPTHER

## 2023-08-13 RX ORDER — LEVOTHYROXINE SODIUM 75 UG/1
75 TABLET ORAL DAILY
Qty: 90 TABLET | Refills: 3 | Status: SHIPPED | OUTPATIENT
Start: 2023-08-13

## 2023-08-13 RX ORDER — VENLAFAXINE HYDROCHLORIDE 150 MG/1
150 CAPSULE, EXTENDED RELEASE ORAL DAILY
Qty: 90 CAPSULE | Refills: 3 | Status: SHIPPED | OUTPATIENT
Start: 2023-08-13

## 2023-08-13 RX ORDER — PRAVASTATIN SODIUM 10 MG/1
10 TABLET ORAL DAILY
Qty: 90 TABLET | Refills: 3 | Status: SHIPPED | OUTPATIENT
Start: 2023-08-13 | End: 2023-09-27

## 2023-08-13 RX ORDER — LANOLIN ALCOHOL/MO/W.PET/CERES
CREAM (GRAM) TOPICAL
Qty: 180 TABLET | Refills: 3 | Status: SHIPPED | OUTPATIENT
Start: 2023-08-13

## 2023-08-13 RX ORDER — PROMETHAZINE HYDROCHLORIDE 25 MG/1
25 TABLET ORAL EVERY 6 HOURS PRN
Qty: 20 TABLET | Refills: 0 | Status: SHIPPED | OUTPATIENT
Start: 2023-08-13 | End: 2023-09-25

## 2023-08-14 ENCOUNTER — TELEPHONE (OUTPATIENT)
Dept: CARDIOLOGY | Facility: CLINIC | Age: 53
End: 2023-08-14
Payer: MEDICARE

## 2023-08-14 ENCOUNTER — HOSPITAL ENCOUNTER (OUTPATIENT)
Dept: RADIOLOGY | Facility: HOSPITAL | Age: 53
Discharge: HOME | End: 2023-08-14
Attending: INTERNAL MEDICINE
Payer: MEDICARE

## 2023-08-14 DIAGNOSIS — C22.0 HEPATOCELLULAR CARCINOMA: ICD-10-CM

## 2023-08-14 PROCEDURE — 93976 VASCULAR STUDY: CPT | Mod: 26,,, | Performed by: RADIOLOGY

## 2023-08-14 PROCEDURE — 93976 VASCULAR STUDY: CPT | Mod: TC

## 2023-08-14 PROCEDURE — 93976 US DOPPLER LIVER TRANSPLANT POST (XPD): ICD-10-PCS | Mod: 26,,, | Performed by: RADIOLOGY

## 2023-08-14 PROCEDURE — 76705 US DOPPLER LIVER TRANSPLANT POST (XPD): ICD-10-PCS | Mod: 26,59,, | Performed by: RADIOLOGY

## 2023-08-14 PROCEDURE — 76705 ECHO EXAM OF ABDOMEN: CPT | Mod: 26,59,, | Performed by: RADIOLOGY

## 2023-08-16 ENCOUNTER — PATIENT MESSAGE (OUTPATIENT)
Dept: FAMILY MEDICINE | Facility: CLINIC | Age: 53
End: 2023-08-16

## 2023-08-16 ENCOUNTER — TELEPHONE (OUTPATIENT)
Dept: FAMILY MEDICINE | Facility: CLINIC | Age: 53
End: 2023-08-16
Payer: MEDICARE

## 2023-08-16 ENCOUNTER — PATIENT MESSAGE (OUTPATIENT)
Dept: TRANSPLANT | Facility: CLINIC | Age: 53
End: 2023-08-16
Payer: MEDICARE

## 2023-08-16 ENCOUNTER — TELEPHONE (OUTPATIENT)
Dept: TRANSPLANT | Facility: CLINIC | Age: 53
End: 2023-08-16
Payer: MEDICARE

## 2023-08-16 ENCOUNTER — OFFICE VISIT (OUTPATIENT)
Dept: FAMILY MEDICINE | Facility: CLINIC | Age: 53
End: 2023-08-16
Payer: MEDICARE

## 2023-08-16 VITALS
OXYGEN SATURATION: 95 % | HEIGHT: 59 IN | WEIGHT: 112 LBS | HEART RATE: 76 BPM | BODY MASS INDEX: 22.58 KG/M2 | SYSTOLIC BLOOD PRESSURE: 132 MMHG | DIASTOLIC BLOOD PRESSURE: 66 MMHG

## 2023-08-16 DIAGNOSIS — D69.6 THROMBOCYTOPENIA, UNSPECIFIED: ICD-10-CM

## 2023-08-16 DIAGNOSIS — Z00.00 WELLNESS EXAMINATION: Primary | ICD-10-CM

## 2023-08-16 DIAGNOSIS — I10 ESSENTIAL HYPERTENSION: ICD-10-CM

## 2023-08-16 DIAGNOSIS — R13.19 ESOPHAGEAL DYSPHAGIA: ICD-10-CM

## 2023-08-16 DIAGNOSIS — F41.9 ANXIETY: Chronic | ICD-10-CM

## 2023-08-16 DIAGNOSIS — Z12.11 COLON CANCER SCREENING: ICD-10-CM

## 2023-08-16 DIAGNOSIS — E03.4 HYPOTHYROIDISM DUE TO ACQUIRED ATROPHY OF THYROID: Chronic | ICD-10-CM

## 2023-08-16 DIAGNOSIS — R41.0 CONFUSION: ICD-10-CM

## 2023-08-16 DIAGNOSIS — Z94.4 S/P LIVER TRANSPLANT: Chronic | ICD-10-CM

## 2023-08-16 DIAGNOSIS — E78.49 OTHER HYPERLIPIDEMIA: ICD-10-CM

## 2023-08-16 DIAGNOSIS — K74.60 CIRRHOSIS OF LIVER WITH ASCITES, UNSPECIFIED HEPATIC CIRRHOSIS TYPE: ICD-10-CM

## 2023-08-16 DIAGNOSIS — I34.0 NONRHEUMATIC MITRAL VALVE REGURGITATION: ICD-10-CM

## 2023-08-16 DIAGNOSIS — G44.229 CHRONIC TENSION-TYPE HEADACHE, NOT INTRACTABLE: ICD-10-CM

## 2023-08-16 DIAGNOSIS — D61.818 PANCYTOPENIA: ICD-10-CM

## 2023-08-16 DIAGNOSIS — R18.8 CIRRHOSIS OF LIVER WITH ASCITES, UNSPECIFIED HEPATIC CIRRHOSIS TYPE: ICD-10-CM

## 2023-08-16 PROCEDURE — 1159F MED LIST DOCD IN RCRD: CPT | Mod: CPTII,S$GLB,, | Performed by: INTERNAL MEDICINE

## 2023-08-16 PROCEDURE — 3075F PR MOST RECENT SYSTOLIC BLOOD PRESS GE 130-139MM HG: ICD-10-PCS | Mod: CPTII,S$GLB,, | Performed by: INTERNAL MEDICINE

## 2023-08-16 PROCEDURE — 99999 PR PBB SHADOW E&M-EST. PATIENT-LVL V: CPT | Mod: PBBFAC,,, | Performed by: INTERNAL MEDICINE

## 2023-08-16 PROCEDURE — 3078F PR MOST RECENT DIASTOLIC BLOOD PRESSURE < 80 MM HG: ICD-10-PCS | Mod: CPTII,S$GLB,, | Performed by: INTERNAL MEDICINE

## 2023-08-16 PROCEDURE — 4010F PR ACE/ARB THEARPY RXD/TAKEN: ICD-10-PCS | Mod: CPTII,S$GLB,, | Performed by: INTERNAL MEDICINE

## 2023-08-16 PROCEDURE — 99214 PR OFFICE/OUTPT VISIT, EST, LEVL IV, 30-39 MIN: ICD-10-PCS | Mod: S$GLB,,, | Performed by: INTERNAL MEDICINE

## 2023-08-16 PROCEDURE — 1160F PR REVIEW ALL MEDS BY PRESCRIBER/CLIN PHARMACIST DOCUMENTED: ICD-10-PCS | Mod: CPTII,S$GLB,, | Performed by: INTERNAL MEDICINE

## 2023-08-16 PROCEDURE — 1159F PR MEDICATION LIST DOCUMENTED IN MEDICAL RECORD: ICD-10-PCS | Mod: CPTII,S$GLB,, | Performed by: INTERNAL MEDICINE

## 2023-08-16 PROCEDURE — 3075F SYST BP GE 130 - 139MM HG: CPT | Mod: CPTII,S$GLB,, | Performed by: INTERNAL MEDICINE

## 2023-08-16 PROCEDURE — 3008F BODY MASS INDEX DOCD: CPT | Mod: CPTII,S$GLB,, | Performed by: INTERNAL MEDICINE

## 2023-08-16 PROCEDURE — 99214 OFFICE O/P EST MOD 30 MIN: CPT | Mod: S$GLB,,, | Performed by: INTERNAL MEDICINE

## 2023-08-16 PROCEDURE — 1160F RVW MEDS BY RX/DR IN RCRD: CPT | Mod: CPTII,S$GLB,, | Performed by: INTERNAL MEDICINE

## 2023-08-16 PROCEDURE — 3078F DIAST BP <80 MM HG: CPT | Mod: CPTII,S$GLB,, | Performed by: INTERNAL MEDICINE

## 2023-08-16 PROCEDURE — 3008F PR BODY MASS INDEX (BMI) DOCUMENTED: ICD-10-PCS | Mod: CPTII,S$GLB,, | Performed by: INTERNAL MEDICINE

## 2023-08-16 PROCEDURE — 99999 PR PBB SHADOW E&M-EST. PATIENT-LVL V: ICD-10-PCS | Mod: PBBFAC,,, | Performed by: INTERNAL MEDICINE

## 2023-08-16 PROCEDURE — 4010F ACE/ARB THERAPY RXD/TAKEN: CPT | Mod: CPTII,S$GLB,, | Performed by: INTERNAL MEDICINE

## 2023-08-16 NOTE — TELEPHONE ENCOUNTER
Sent message to let patient know US was stable  ----- Message from Jaya Nielsen MD sent at 8/15/2023  5:35 PM CDT -----  Results reviewed. No action.

## 2023-08-16 NOTE — TELEPHONE ENCOUNTER
Received fax from [facility] for new prescriptions for patient.  Called patient to verify she wanted to change her pharmacy before I sent over new prescriptions.  She stated she thought about it but decided she would keep her prescriptions at her local [facility].

## 2023-08-16 NOTE — TELEPHONE ENCOUNTER
Sent patient message via portal to let her know labs are stable.  No medication changes, next labs due on 9/23/23      ----- Message from Jaya Nielsen MD sent at 8/15/2023  5:34 PM CDT -----  Results reviewed. No action.

## 2023-08-16 NOTE — PROGRESS NOTES
"[facility]  Primary Care   [address]       Patient ID: Elda Hernandez     Chief Complaint:   Chief Complaint   Patient presents with    Follow-up     6 month         HPI: Annual exam and doing better than the last time I saw her where she had memory loss and hand tremor due to hepatic encephalopathy. Ammonia level was elevated and she did get a lot better with Lactulose. Xifaxin was added too.   Still has short term memory loss and will try to find a Neurologist closer to home in [place].  Colonoscopy request sent to [facility]. Also placed EGD request due to dysphagia for pills and solids.   Mammo scheduled and will try to get DEXA the same day (ordered)   Handicapped Tag form completed  Liver Ultrasound stable but she does have cirrhosis that we are monitoring.   Ascites Controlled with meds (Lasix and Aldactone)   To see Dr. Upton (Cards) and will check lipids.   Hypothyroidism Controlled with med   Headaches occur frequently and taking 2 Butalbital at a time- refer to neuro for consideration of Nurte    Review of Systems       Memory loss     Objective:      Physical Exam   Physical Exam       1-2 systolic ejection murmur     Vitals:   Vitals:    08/16/23 1626   BP: 132/66   Pulse: 76   SpO2: 95%   Weight: 50.8 kg (111 lb 15.9 oz)   Height: 4' 11" (1.499 m)        Assessment:           Plan:       Elda Hernandez  was seen today for follow-up and may need lab work.    Diagnoses and all orders for this visit:    Elda Doran was seen today for follow-up.    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

## 2023-08-18 DIAGNOSIS — E83.42 HYPOMAGNESEMIA: ICD-10-CM

## 2023-08-18 DIAGNOSIS — Z94.4 LIVER REPLACED BY TRANSPLANT: Primary | ICD-10-CM

## 2023-08-23 ENCOUNTER — TELEPHONE (OUTPATIENT)
Dept: NEUROLOGY | Facility: CLINIC | Age: 53
End: 2023-08-23
Payer: MEDICARE

## 2023-08-23 NOTE — TELEPHONE ENCOUNTER
Spoke to the pt, appt scheduled on 11/14/23 at 1300 with Constance Duffy for migraines.    Date, time and location discussed.

## 2023-08-30 ENCOUNTER — PATIENT MESSAGE (OUTPATIENT)
Dept: GASTROENTEROLOGY | Facility: CLINIC | Age: 53
End: 2023-08-30
Payer: MEDICARE

## 2023-09-01 NOTE — TELEPHONE ENCOUNTER
Care Due:                  Date            Visit Type   Department     Provider  --------------------------------------------------------------------------------                                EP -                              [facility]  Last Visit: 08-      CARE ([facility])   ROLLY Lorenzo                               -                              [facility]  Next Visit: 02-      CARE ([facility])   MEDICINE       David Lorenzo                                                            Last  Test          Frequency    Reason                     Performed    Due Date  --------------------------------------------------------------------------------    Lipid Panel.  12 months..  pravastatin..............  Not Found    Overdue    [employer] Embedded Care Due Messages. Reference number: [id number].   9/01/2023 2:24:23 PM CDT

## 2023-09-02 RX ORDER — BUTALBITAL AND ACETAMINOPHEN 325; 50 MG/1; MG/1
TABLET ORAL
Qty: 30 TABLET | Refills: 0 | Status: SHIPPED | OUTPATIENT
Start: 2023-09-02 | End: 2023-09-22 | Stop reason: SDUPTHER

## 2023-09-22 DIAGNOSIS — R11.0 NAUSEA: ICD-10-CM

## 2023-09-22 RX ORDER — FUROSEMIDE 20 MG/1
20 TABLET ORAL DAILY
Qty: 90 TABLET | Refills: 3 | OUTPATIENT
Start: 2023-09-22 | End: 2024-09-21

## 2023-09-22 RX ORDER — SPIRONOLACTONE 50 MG/1
50 TABLET, FILM COATED ORAL DAILY
Qty: 90 TABLET | Refills: 3 | OUTPATIENT
Start: 2023-09-22 | End: 2024-09-21

## 2023-09-22 NOTE — TELEPHONE ENCOUNTER
No care due was identified.  [facility] Embedded Care Due Messages. Reference number: [id number].   9/22/2023 3:05:53 PM CDT

## 2023-09-22 NOTE — TELEPHONE ENCOUNTER
No care due was identified.  [facility] Embedded Care Due Messages. Reference number: [id number].   9/22/2023 12:24:42 PM CDT

## 2023-09-23 ENCOUNTER — PATIENT MESSAGE (OUTPATIENT)
Dept: FAMILY MEDICINE | Facility: CLINIC | Age: 53
End: 2023-09-23
Payer: MEDICARE

## 2023-09-23 RX ORDER — ONDANSETRON 4 MG/1
4 TABLET, ORALLY DISINTEGRATING ORAL EVERY 6 HOURS PRN
Qty: 30 TABLET | Refills: 0 | Status: SHIPPED | OUTPATIENT
Start: 2023-09-23 | End: 2023-11-02

## 2023-09-23 RX ORDER — BUTALBITAL AND ACETAMINOPHEN 325; 50 MG/1; MG/1
1 TABLET ORAL 2 TIMES DAILY PRN
Qty: 60 TABLET | Refills: 0 | Status: SHIPPED | OUTPATIENT
Start: 2023-09-23 | End: 2023-11-02

## 2023-09-24 DIAGNOSIS — R11.0 NAUSEA: ICD-10-CM

## 2023-09-24 NOTE — TELEPHONE ENCOUNTER
No care due was identified.  [facility] Embedded Care Due Messages. Reference number: [id number].   9/24/2023 1:35:20 PM CDT

## 2023-09-25 ENCOUNTER — PATIENT MESSAGE (OUTPATIENT)
Dept: TRANSPLANT | Facility: CLINIC | Age: 53
End: 2023-09-25
Payer: MEDICARE

## 2023-09-25 RX ORDER — PROMETHAZINE HYDROCHLORIDE 25 MG/1
25 TABLET ORAL EVERY 6 HOURS PRN
Qty: 20 TABLET | Refills: 0 | Status: SHIPPED | OUTPATIENT
Start: 2023-09-25 | End: 2023-11-02

## 2023-09-26 DIAGNOSIS — K76.82 HEPATIC ENCEPHALOPATHY: Primary | ICD-10-CM

## 2023-09-27 ENCOUNTER — OFFICE VISIT (OUTPATIENT)
Dept: CARDIOLOGY | Facility: CLINIC | Age: 53
End: 2023-09-27
Payer: MEDICARE

## 2023-09-27 ENCOUNTER — TELEPHONE (OUTPATIENT)
Dept: CARDIOLOGY | Facility: CLINIC | Age: 53
End: 2023-09-27

## 2023-09-27 ENCOUNTER — LAB VISIT (OUTPATIENT)
Dept: LAB | Facility: HOSPITAL | Age: 53
End: 2023-09-27
Attending: INTERNAL MEDICINE
Payer: MEDICARE

## 2023-09-27 VITALS
SYSTOLIC BLOOD PRESSURE: 125 MMHG | BODY MASS INDEX: 23.24 KG/M2 | DIASTOLIC BLOOD PRESSURE: 60 MMHG | HEART RATE: 62 BPM | HEIGHT: 59 IN | WEIGHT: 115.31 LBS | OXYGEN SATURATION: 99 %

## 2023-09-27 DIAGNOSIS — I08.0 MITRAL AND AORTIC REGURGITATION: ICD-10-CM

## 2023-09-27 DIAGNOSIS — Z91.89 SEDENTARY LIFESTYLE: ICD-10-CM

## 2023-09-27 DIAGNOSIS — R06.09 DOE (DYSPNEA ON EXERTION): ICD-10-CM

## 2023-09-27 DIAGNOSIS — Z79.60 LONG-TERM USE OF IMMUNOSUPPRESSANT MEDICATION: ICD-10-CM

## 2023-09-27 DIAGNOSIS — E22.2 SIADH (SYNDROME OF INAPPROPRIATE ADH PRODUCTION): Chronic | ICD-10-CM

## 2023-09-27 DIAGNOSIS — G62.0 DRUG-INDUCED PERIPHERAL NEUROPATHY: Chronic | ICD-10-CM

## 2023-09-27 DIAGNOSIS — Z82.49 FAMILY HISTORY OF PREMATURE CAD: ICD-10-CM

## 2023-09-27 DIAGNOSIS — N18.31 STAGE 3A CHRONIC KIDNEY DISEASE: ICD-10-CM

## 2023-09-27 DIAGNOSIS — R79.89 PRERENAL AZOTEMIA: ICD-10-CM

## 2023-09-27 DIAGNOSIS — I10 ESSENTIAL HYPERTENSION: ICD-10-CM

## 2023-09-27 DIAGNOSIS — E88.09 HYPOALBUMINEMIA: ICD-10-CM

## 2023-09-27 DIAGNOSIS — I70.0 AORTIC ATHEROSCLEROSIS: ICD-10-CM

## 2023-09-27 DIAGNOSIS — Z94.4 LIVER REPLACED BY TRANSPLANT: ICD-10-CM

## 2023-09-27 DIAGNOSIS — R79.89 ELEVATED BRAIN NATRIURETIC PEPTIDE (BNP) LEVEL: ICD-10-CM

## 2023-09-27 DIAGNOSIS — G47.19 EXCESSIVE DAYTIME SLEEPINESS: ICD-10-CM

## 2023-09-27 DIAGNOSIS — T86.41 CHRONIC REJECTION OF LIVER TRANSPLANT: ICD-10-CM

## 2023-09-27 DIAGNOSIS — F13.20 BARBITURATE DEPENDENCE: ICD-10-CM

## 2023-09-27 DIAGNOSIS — I34.1 MVP (MITRAL VALVE PROLAPSE): ICD-10-CM

## 2023-09-27 DIAGNOSIS — R53.82 CHRONIC FATIGUE: ICD-10-CM

## 2023-09-27 DIAGNOSIS — D61.818 PANCYTOPENIA: ICD-10-CM

## 2023-09-27 DIAGNOSIS — E83.42 HYPOMAGNESEMIA: ICD-10-CM

## 2023-09-27 DIAGNOSIS — E78.00 HYPERCHOLESTEROLEMIA: ICD-10-CM

## 2023-09-27 DIAGNOSIS — I34.0 NONRHEUMATIC MITRAL VALVE REGURGITATION: Primary | ICD-10-CM

## 2023-09-27 DIAGNOSIS — Z94.4 S/P LIVER TRANSPLANT: Chronic | ICD-10-CM

## 2023-09-27 DIAGNOSIS — Z91.81 AT RISK FOR FALLING: ICD-10-CM

## 2023-09-27 DIAGNOSIS — I65.21 STENOSIS OF RIGHT CAROTID ARTERY: Primary | ICD-10-CM

## 2023-09-27 DIAGNOSIS — I44.7 LBBB (LEFT BUNDLE BRANCH BLOCK): ICD-10-CM

## 2023-09-27 DIAGNOSIS — F09 COGNITIVE DYSFUNCTION: ICD-10-CM

## 2023-09-27 DIAGNOSIS — I11.9 LVH (LEFT VENTRICULAR HYPERTROPHY) DUE TO HYPERTENSIVE DISEASE, WITHOUT HEART FAILURE: ICD-10-CM

## 2023-09-27 DIAGNOSIS — I36.1 NON-RHEUMATIC TRICUSPID VALVE INSUFFICIENCY: ICD-10-CM

## 2023-09-27 LAB
ALBUMIN SERPL BCP-MCNC: 3.1 G/DL (ref 3.5–5.2)
ALP SERPL-CCNC: 469 U/L (ref 55–135)
ALT SERPL W/O P-5'-P-CCNC: 40 U/L (ref 10–44)
ANION GAP SERPL CALC-SCNC: 10 MMOL/L (ref 8–16)
AST SERPL-CCNC: 56 U/L (ref 10–40)
BASOPHILS # BLD AUTO: 0.02 K/UL (ref 0–0.2)
BASOPHILS NFR BLD: 0.9 % (ref 0–1.9)
BILIRUB SERPL-MCNC: 2.7 MG/DL (ref 0.1–1)
BUN SERPL-MCNC: 60 MG/DL (ref 6–20)
CALCIUM SERPL-MCNC: 8.6 MG/DL (ref 8.7–10.5)
CHLORIDE SERPL-SCNC: 109 MMOL/L (ref 95–110)
CO2 SERPL-SCNC: 18 MMOL/L (ref 23–29)
CREAT SERPL-MCNC: 2.8 MG/DL (ref 0.5–1.4)
DIFFERENTIAL METHOD: ABNORMAL
EOSINOPHIL # BLD AUTO: 0.2 K/UL (ref 0–0.5)
EOSINOPHIL NFR BLD: 8.8 % (ref 0–8)
ERYTHROCYTE [DISTWIDTH] IN BLOOD BY AUTOMATED COUNT: 14.3 % (ref 11.5–14.5)
EST. GFR  (NO RACE VARIABLE): 19.3 ML/MIN/1.73 M^2
GLUCOSE SERPL-MCNC: 93 MG/DL (ref 70–110)
HCT VFR BLD AUTO: 29.4 % (ref 37–48.5)
HGB BLD-MCNC: 9.9 G/DL (ref 12–16)
IMM GRANULOCYTES # BLD AUTO: 0 K/UL (ref 0–0.04)
IMM GRANULOCYTES NFR BLD AUTO: 0 % (ref 0–0.5)
INR PPP: 1.2 (ref 0.8–1.2)
LYMPHOCYTES # BLD AUTO: 0.5 K/UL (ref 1–4.8)
LYMPHOCYTES NFR BLD: 20.4 % (ref 18–48)
MAGNESIUM SERPL-MCNC: 1.9 MG/DL (ref 1.6–2.6)
MCH RBC QN AUTO: 30.7 PG (ref 27–31)
MCHC RBC AUTO-ENTMCNC: 33.7 G/DL (ref 32–36)
MCV RBC AUTO: 91 FL (ref 82–98)
MONOCYTES # BLD AUTO: 0.2 K/UL (ref 0.3–1)
MONOCYTES NFR BLD: 10.6 % (ref 4–15)
NEUTROPHILS # BLD AUTO: 1.3 K/UL (ref 1.8–7.7)
NEUTROPHILS NFR BLD: 59.3 % (ref 38–73)
NRBC BLD-RTO: 0 /100 WBC
PLATELET # BLD AUTO: 49 K/UL (ref 150–450)
PMV BLD AUTO: 11.4 FL (ref 9.2–12.9)
POTASSIUM SERPL-SCNC: 4.8 MMOL/L (ref 3.5–5.1)
PROT SERPL-MCNC: 6.5 G/DL (ref 6–8.4)
PROTHROMBIN TIME: 12.8 SEC (ref 9–12.5)
RBC # BLD AUTO: 3.23 M/UL (ref 4–5.4)
SODIUM SERPL-SCNC: 137 MMOL/L (ref 136–145)
WBC # BLD AUTO: 2.26 K/UL (ref 3.9–12.7)

## 2023-09-27 PROCEDURE — 3008F BODY MASS INDEX DOCD: CPT | Mod: CPTII,S$GLB,, | Performed by: INTERNAL MEDICINE

## 2023-09-27 PROCEDURE — 3078F DIAST BP <80 MM HG: CPT | Mod: CPTII,S$GLB,, | Performed by: INTERNAL MEDICINE

## 2023-09-27 PROCEDURE — 1159F PR MEDICATION LIST DOCUMENTED IN MEDICAL RECORD: ICD-10-PCS | Mod: CPTII,S$GLB,, | Performed by: INTERNAL MEDICINE

## 2023-09-27 PROCEDURE — 99999 PR PBB SHADOW E&M-EST. PATIENT-LVL IV: CPT | Mod: PBBFAC,,, | Performed by: INTERNAL MEDICINE

## 2023-09-27 PROCEDURE — 85025 COMPLETE CBC W/AUTO DIFF WBC: CPT | Performed by: INTERNAL MEDICINE

## 2023-09-27 PROCEDURE — 80197 ASSAY OF TACROLIMUS: CPT | Performed by: INTERNAL MEDICINE

## 2023-09-27 PROCEDURE — 83735 ASSAY OF MAGNESIUM: CPT | Performed by: INTERNAL MEDICINE

## 2023-09-27 PROCEDURE — 85610 PROTHROMBIN TIME: CPT | Performed by: INTERNAL MEDICINE

## 2023-09-27 PROCEDURE — 4010F PR ACE/ARB THEARPY RXD/TAKEN: ICD-10-PCS | Mod: CPTII,S$GLB,, | Performed by: INTERNAL MEDICINE

## 2023-09-27 PROCEDURE — 99205 PR OFFICE/OUTPT VISIT, NEW, LEVL V, 60-74 MIN: ICD-10-PCS | Mod: S$GLB,,, | Performed by: INTERNAL MEDICINE

## 2023-09-27 PROCEDURE — 3074F PR MOST RECENT SYSTOLIC BLOOD PRESSURE < 130 MM HG: ICD-10-PCS | Mod: CPTII,S$GLB,, | Performed by: INTERNAL MEDICINE

## 2023-09-27 PROCEDURE — 80053 COMPREHEN METABOLIC PANEL: CPT | Performed by: INTERNAL MEDICINE

## 2023-09-27 PROCEDURE — 4010F ACE/ARB THERAPY RXD/TAKEN: CPT | Mod: CPTII,S$GLB,, | Performed by: INTERNAL MEDICINE

## 2023-09-27 PROCEDURE — 3074F SYST BP LT 130 MM HG: CPT | Mod: CPTII,S$GLB,, | Performed by: INTERNAL MEDICINE

## 2023-09-27 PROCEDURE — 1159F MED LIST DOCD IN RCRD: CPT | Mod: CPTII,S$GLB,, | Performed by: INTERNAL MEDICINE

## 2023-09-27 PROCEDURE — 3078F PR MOST RECENT DIASTOLIC BLOOD PRESSURE < 80 MM HG: ICD-10-PCS | Mod: CPTII,S$GLB,, | Performed by: INTERNAL MEDICINE

## 2023-09-27 PROCEDURE — 3008F PR BODY MASS INDEX (BMI) DOCUMENTED: ICD-10-PCS | Mod: CPTII,S$GLB,, | Performed by: INTERNAL MEDICINE

## 2023-09-27 PROCEDURE — 99205 OFFICE O/P NEW HI 60 MIN: CPT | Mod: S$GLB,,, | Performed by: INTERNAL MEDICINE

## 2023-09-27 PROCEDURE — 99999 PR PBB SHADOW E&M-EST. PATIENT-LVL IV: ICD-10-PCS | Mod: PBBFAC,,, | Performed by: INTERNAL MEDICINE

## 2023-09-27 RX ORDER — PRAVASTATIN SODIUM 20 MG/1
20 TABLET ORAL DAILY
Qty: 90 TABLET | Refills: 3 | Status: SHIPPED | OUTPATIENT
Start: 2023-09-27

## 2023-09-27 NOTE — PROGRESS NOTES
Subjective:    Patient ID:  Elda Hernandez is a 53 y.o. female who presents for evaluation of Establish Care  PCP and referred by David Lorenzo MD  Prior cardiologist: Essie Frank MD, last seen 7/2022  Liver transplant: Jaya Nielsen MD  Lives with , Kody, here with patient, non-smoker  Disabled with liver transplant, 2002, prior CMA    Patient is a new patient to me.    Social Determinate of Health: Do you have any problem with the following: hearing  Health Literacy (understanding): high  Vaccination: up to date yes, covid vaccine, covid infection no  Activities: Any Problems or Limitations hard time with balance, fatigue very easily, 2 nd stage liver failure. Unable to work out, mostly sedentary since 2020  Nicotine: non-smoker  Alcohol: How often? none  Illicit Drugs: none, on Rx butalbital for HA, every other day, started 2017  Cardiac Symptoms: none  Home BP: do not check regularly   Medication Compliance: yes, do not miss  Diet: liquid diet  Caffeine: How much do you consume a day? none  Labs:   Lab Results   Component Value Date    TSH 2.584 02/28/2023        Lab Results   Component Value Date    HGBA1C 4.3 09/03/2020       Lab Results   Component Value Date    WBC 2.76 (L) 08/14/2023    HGB 10.3 (L) 08/14/2023    HCT 30.4 (L) 08/14/2023    MCV 89 08/14/2023    PLT 55 (L) 08/14/2023       CMP  Sodium   Date Value Ref Range Status   08/14/2023 138 136 - 145 mmol/L Final     Potassium   Date Value Ref Range Status   08/14/2023 4.3 3.5 - 5.1 mmol/L Final     Chloride   Date Value Ref Range Status   08/14/2023 109 95 - 110 mmol/L Final     CO2   Date Value Ref Range Status   08/14/2023 22 (L) 23 - 29 mmol/L Final     Glucose   Date Value Ref Range Status   08/14/2023 87 70 - 110 mg/dL Final     BUN   Date Value Ref Range Status   08/14/2023 28 (H) 6 - 20 mg/dL Final     Creatinine   Date Value Ref Range Status   08/14/2023 1.2 0.5 - 1.4 mg/dL Final     Calcium   Date Value Ref Range Status    08/14/2023 8.9 8.7 - 10.5 mg/dL Final     Total Protein   Date Value Ref Range Status   08/14/2023 6.6 6.0 - 8.4 g/dL Final     Albumin   Date Value Ref Range Status   08/14/2023 2.9 (L) 3.5 - 5.2 g/dL Final     Total Bilirubin   Date Value Ref Range Status   08/14/2023 2.8 (H) 0.1 - 1.0 mg/dL Final     Comment:     For infants and newborns, interpretation of results should be based  on gestational age, weight and in agreement with clinical  observations.    Premature Infant recommended reference ranges:  Up to 24 hours.............<8.0 mg/dL  Up to 48 hours............<12.0 mg/dL  3-5 days..................<15.0 mg/dL  6-29 days.................<15.0 mg/dL       Alkaline Phosphatase   Date Value Ref Range Status   08/14/2023 594 (H) 55 - 135 U/L Final     AST   Date Value Ref Range Status   08/14/2023 81 (H) 10 - 40 U/L Final     ALT   Date Value Ref Range Status   08/14/2023 60 (H) 10 - 44 U/L Final     Anion Gap   Date Value Ref Range Status   08/14/2023 7 (L) 8 - 16 mmol/L Final     eGFR if    Date Value Ref Range Status   05/28/2022 >60.0 >60 mL/min/1.73 m^2 Final     eGFR if non    Date Value Ref Range Status   05/28/2022 >60.0 >60 mL/min/1.73 m^2 Final     Comment:     Calculation used to obtain the estimated glomerular filtration  rate (eGFR) is the CKD-EPI equation.        @labrcntip(troponini)@    BNP   Date Value Ref Range Status   09/04/2020 143 (H) 0 - 99 pg/mL Final     Comment:     Values of less than 100 pg/ml are consistent with non-CHF populations.   }   Lab Results   Component Value Date    CHOL 298 (H) 05/12/2017    CHOL 199 08/02/2014    CHOL 242 (H) 03/26/2011     Lab Results   Component Value Date     (H) 05/12/2017    HDL 74 08/02/2014    HDL 73 03/26/2011     Lab Results   Component Value Date    LDLCALC 147.2 05/12/2017    LDLCALC 109.0 08/02/2014    LDLCALC 141.8 (H) 03/26/2011     Lab Results   Component Value Date    TRIG 90 10/10/2020    TRIG 409  "(H) 10/07/2020    TRIG 142 10/06/2020     Lab Results   Component Value Date    CHOLHDL 45.0 05/12/2017    CHOLHDL 37.2 08/02/2014    CHOLHDL 30.2 03/26/2011       Last Echo: 3/2022  Last stress test: Lexiscan 3/2022  Cardiovascular angiogram: none  ECG: NSR, rate 61, LBBB  Fundoscopic exam: within the past 2 year, negative for retinopathy    [facility] referred for CV follow up with complex past medical history and dx of MVP with MR and TR. No heart issues but quite limited due to comorbidities. Mother had 2nd stroke at age 67.    Echo 3/2022 - The left ventricle is normal in size with concentric hypertrophy and normal systolic function.  Indeterminate left ventricular diastolic function.  The estimated ejection fraction is 65%.  Normal right ventricular size with normal right ventricular systolic function.  Mild aortic regurgitation.  Moderate eccentric MR mitral regurgitation.  Mild tricuspid regurgitation.  Intermediate central venous pressure (8 mmHg).  The estimated PA systolic pressure is 37 mmHg.    Lexiscan - Normal myocardial perfusion scan. There is no evidence of myocardial ischemia or infarction.  ·  The gated perfusion images showed an ejection fraction of 75% at rest.  ·  The EKG portion of this study is negative for ischemia.  ·  There were no arrhythmias during stress.    David Lorenzo MD noted 8/16/2023 " Annual exam and doing better than the last time I saw her where she had memory loss and hand tremor due to hepatic encephalopathy. Ammonia level was elevated and she did get a lot better with Lactulose. Xifaxin was added too.   Still has short term memory loss and will try to find a Neurologist closer to home in [place].    Liver Ultrasound stable but she does have cirrhosis that we are monitoring.   Ascites Controlled with meds (Lasix and Aldactone)   To see Dr. Upton (Cards) and will check lipids.    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 Moderate atherosclerotic calcifications. Multiple venous collaterals are seen throughout the abdomen.  Heart: The visualized portions of the heart are normal. No pericardial effusion.    Essie Frank MD noted 7/2022 "DISCUSSED TESTS, NORMAL STRESS, ECHO NORMAL LV FX, MOD MR, MILD AI, 40-49% IRAJ STENOSIS, OVERALL DOING OKAY FROM LIVER STANDPOINT, NO CHEST PAIN NO SHORTNESS OF BREATH    VS - BP:  (!) 165/76  (!) 152/76  Pulse:  78     Weight:  49.4 kg (108 lb 14.5 oz)     Height:  4' 11" (1.499 m)    Essential hypertension     LBBB (left bundle branch block)     Mitral and aortic regurgitation - Primary     Stenosis of right carotid artery     Hypercholesterolemia    INCREASE RAMIPRIL TO 5 MG, WATCH BLOOD PRESSURE, CONSIDER LOW-DOSE STATIN IN VIEW OF CAROTID STENOSIS PRAVACHOL MIGHT BE BEST OPTION IN THIS PATIENT WITH LIVER DISEASE, IF OK WITH TRANSPLANT TEAM, PATIENT CHECK"           Review of Systems   Constitutional: Positive for malaise/fatigue and weight gain (up 6 lbs since 7/2022). Negative for diaphoresis, fever and night sweats.   HENT:  Positive for congestion, hearing loss and sore throat. Negative for nosebleeds and tinnitus.    Eyes:  Positive for blurred vision, discharge and photophobia. Negative for visual disturbance.   Cardiovascular:  Positive for dyspnea on exertion and syncope. Negative for chest pain, claudication, cyanosis, irregular heartbeat, leg swelling, near-syncope, orthopnea, palpitations and paroxysmal nocturnal dyspnea.   Respiratory:  Positive for snoring and sputum production. Negative for cough, shortness of breath, sleep disturbances due to breathing and wheezing.         Hamilton score 6, awaken tired.   Endocrine: Positive for polydipsia. Negative for polyuria.   Hematologic/Lymphatic: Bruises/bleeds easily.   Skin:  Positive for itching and " rash. Negative for color change, flushing, nail changes, poor wound healing and suspicious lesions.   Musculoskeletal:  Positive for back pain, falls and myalgias. Negative for arthritis, gout, joint pain, joint swelling, muscle cramps and muscle weakness.   Gastrointestinal:  Positive for abdominal pain, constipation, diarrhea, heartburn, nausea and vomiting. Negative for hematemesis, hematochezia and melena.   Neurological:  Positive for aphonia, dizziness, focal weakness, headaches, loss of balance, paresthesias, seizures and tremors. Negative for disturbances in coordination, excessive daytime sleepiness, light-headedness, numbness, vertigo and weakness.   Psychiatric/Behavioral:  Positive for depression and memory loss. Negative for substance abuse. The patient has insomnia and is nervous/anxious.    Allergic/Immunologic: Positive for environmental allergies.        Objective:    Physical Exam  Constitutional:       Appearance: She is well-developed.      Comments: RA O2 sat 99%  Orthostatic VS: sitting 126/60, standing 125/60   HENT:      Head: Normocephalic.   Eyes:      Conjunctiva/sclera: Conjunctivae normal.      Pupils: Pupils are equal, round, and reactive to light.   Neck:      Thyroid: No thyromegaly.      Vascular: No JVD.   Cardiovascular:      Rate and Rhythm: Normal rate and regular rhythm.      Pulses: Intact distal pulses.           Carotid pulses are 1+ on the right side and 1+ on the left side.       Radial pulses are 1+ on the right side and 1+ on the left side.        Dorsalis pedis pulses are 1+ on the right side and 1+ on the left side.        Posterior tibial pulses are 1+ on the right side and 1+ on the left side.      Heart sounds: Murmur heard.      Medium-pitched holosystolic murmur is present with a grade of 2/6.      No friction rub. No gallop.   Pulmonary:      Effort: Pulmonary effort is normal.      Breath sounds: Normal breath sounds. No rales.   Chest:      Chest wall: No  "tenderness.   Abdominal:      General: Bowel sounds are normal.      Palpations: Abdomen is soft.      Tenderness: There is no abdominal tenderness.      Comments: Waist 33"   Musculoskeletal:         General: Normal range of motion.      Cervical back: Normal range of motion and neck supple.      Right lower leg: No edema.      Left lower leg: No edema.   Lymphadenopathy:      Cervical: No cervical adenopathy.   Skin:     General: Skin is warm and dry.      Findings: No rash.   Neurological:      Mental Status: She is alert and oriented to person, place, and time.      Comments: Fine tremors in all 4 extremities.           Assessment:       1. Nonrheumatic mitral valve regurgitation    2. Essential hypertension    3. LBBB (left bundle branch block)    4. MVP (mitral valve prolapse)    5. 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 Non-rheumatic tricuspid valve insufficiency    7. Pancytopenia    8. Sedentary lifestyle, onset 2020    9. 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 Prerenal azotemia    11. Hypoalbuminemia    12. Stage 3a chronic kidney disease    13. Elevated brain natriuretic peptide (BNP) level    14. SIADH (syndrome of inappropriate ADH production)    15. Drug-induced peripheral neuropathy    16. Chronic rejection of liver transplant    17. Long-term use of immunosuppressant medication    18. Mitral and aortic regurgitation    19. Family history of premature stroke    20. Aortic atherosclerosis    21. LVH (left ventricular hypertrophy) due to hypertensive disease, without heart failure    22. Excessive daytime sleepiness    23. BLAIR (dyspnea on exertion), onset 2020    24. At risk for falling    25. Cognitive dysfunction    26. Chronic fatigue    27. Hypercholesterolemia         Plan:       Elda Doran was seen today for establish care.    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    Non-rheumatic tricuspid valve insufficiency  -     Echo; Future    Pancytopenia    Sedentary lifestyle, onset 2020    Barbiturate dependence, for HA, started 2017, use every other day    Prerenal azotemia    Hypoalbuminemia    Stage 3a chronic kidney disease    Elevated brain natriuretic peptide (BNP) level    SIADH (syndrome of inappropriate ADH production)    Drug-induced peripheral neuropathy    Chronic rejection of liver transplant    Long-term use of immunosuppressant medication    Mitral and aortic regurgitation    Family history of premature stroke    Aortic atherosclerosis  -     Lipid Panel; Future    LVH (left ventricular hypertrophy) due to hypertensive disease, without heart failure    Excessive daytime sleepiness    BLAIR (dyspnea on exertion), onset 2020  -     Echo; Future    At risk for falling    Cognitive dysfunction    Chronic fatigue    Hypercholesterolemia  -     Lipid Panel; Future     - All medical issues reviewed, continue current Rx.  - Info on [facility]  - CV status and all medications reviewed, patient acknowledge good understanding.  - Recommend healthy living: healthy diet and regular exercise aiming for fitness, restorative sleep and weight control  - Need good exercise program, 4 key elements: 1. Aerobic (walking, swimming, dancing, jogging, biking, etc, 2. Muscle strengthening / resistance exercise, need to do 2-3 times weekly, 3. Stretching daily, good stretch takes a whole  total minute. 4. Balance exercise daily.   - Encourage activities as much as tolerated. Any activity is better than none!   - Instruction for Mediterranean diet and heart healthy exercise given.  - Check home blood pressure, 2 days weekly, do 2 readings within 5 minutes in AM and PM, keep log for review. Target resting BP is  less than 130/80.   - Highly recommend 30-60 minutes of exercise / activities daily, can have Sunday off, with 2-3 sessions of muscle strengthening weekly. A  would be very helpful.  - Recommend at least annual cardiovascular evaluation in view of patient's significant risk factors.  - Phone review / encourage use of [facility]     Total time spend including review of record prior to face-to-face visit is 65 minutes. Greater than 50% of the time was spent in counseling and coordination of care. The above assessment and plan have been discussed at length. Referring provider's note reviewed. Labs and procedure over the last 6 months reviewed. Problem List updated. Asked to bring in all active medications / pills bottles with next visit. Will send note to referring / PCP.

## 2023-09-27 NOTE — PATIENT INSTRUCTIONS
Recommended Mediterranean dietEating Heart-Healthy Food: Using the DASH Plan  Eating for your heart doesnt have to be hard or boring. You just need to know how to make healthier choices. The DASH eating plan has been developed to help you do just that. DASH stands for Dietary Approaches to Stop Hypertension. It is a plan that has been proven to be healthier for your heart and to lower your risk for high blood pressure. It can also help lower your risk for cancer, heart disease, osteoporosis, and diabetes.  Choosing from Each Food Group  Choose foods from each of the food groups below each day. Try to get the recommended number of servings for each food group. The serving numbers are based on a diet of 2,000 calories a day. Talk to your doctor if youre unsure about your calorie needs.  Grains   Servings: 7-8 a day  A serving is:  1 slice bread  1 ounce dry cereal  half a cup cooked rice or pasta  Best choices: Whole grains and any grains high in fiber.  Vegetables   Servings: 4-5 a day  A serving is:  1 cup raw leafy vegetable  Half a cup cooked vegetable  Three-quarter cup vegetable juice  Best choices: Fresh or frozen vegetable prepared without too much added salt or fat.    Fruits   Servings: 4-5 a day  A serving is:  Three-quarter cup fruit juice  1 medium fruit  One-quarter cup dried fruit  One-half cup fresh, frozen, or canned fruit  Best choices: A variety of fresh fruits of different colors. Whole fruits are a much better choice than fruit juices.  Low-fat or Fat Free Dairy   Servings: 2-3 a day  A serving is:  8 ounces milk  1 cup yogurt  One and a half ounces cheese  Best choices: Skim or 1% milk, low-fat or fat free yogurt or buttermilk, and low-fat cheeses.       Meat, Poultry, Fish   Servings: 2 or fewer a day  A serving is:  3 ounces cooked meat, poultry, or fish  Best choices: Lean meats and fish. Trim away visible fat. Broil, roast, or boil instead of frying. Remove skin from poultry before eating.   Nuts, Seeds, Beans   Servings: 4-5 a week  A serving is:  One third cup nuts (or one and a half ounces)  2 tablespoons sunflower seeds  Half a cup cooked beans  Best choices: Dry roasted nuts with no salt added, lentils, kidney beans, garbanzo beans, and whole machuca beans.    Fats and Oils   Servings: 2 a day  A serving is:  1 teaspoon vegetable oil  1 teaspoon soft margarine  1 tablespoon low-fat mayonnaise  1 teaspoon regular mayonnaise  2 tablespoons light salad dressing  1 tablespoon regular salad dressing  Best choices: Monounsaturated and polyunsaturated fats such as olive, canola, or safflower oil.  Sweets   Servings: 5 a week or fewer  A serving is:  1 tablespoon sugar, maple syrup, or honey  1 tablespoon jam or jelly  1 half-ounce jelly beans (about 15)  8 ounces lemonade  Best choices: Dried fruit can be a satisfying sweet. Choose low-fat sweets when possible. And watch your serving sizes!       Aerobic Exercise for a Healthy Heart  Exercise is a lot more than an energy booster and a stress reliever. It also strengthens your heart muscle, lowers your blood pressure and blood cholesterol, and burns calories.      Remember, some activity is better than none.     Choose an Aerobic Activity  Choose a nonstop activity that makes your heart and lungs work harder than they do when you rest or walk normally. This aerobic exercise can improve the way your heart and other muscles use oxygen. Make it fun by exercising with a friend and choosing an activity you enjoy. Here are some ideas:  Walking  Swimming  Bicycling  Stair climbing  Dancing  Jogging  Exercise Regularly  If you havent been exercising regularly,  get your doctors okay first. Then start slowly.  Here are some tips:  Begin exercising 3 times a week for 5-10 minutes at a time.  When you feel comfortable, add a few minutes each week.  Slowly build up to exercising 3-4 times each week for 20-40 minutes. Aim for a total of 150 or more minutes a  week.  Be sure to carry your nitroglycerin with you when you exercise.  If you get angina when youre exercising, stop what youre doing, take your nitroglycerin, and call your doctor.  © 4366-2351 [employer], [address] [place] [address], [place] [address]. All rights reserved. This information is not intended as a substitute for professional medical care. Always follow your healthcare professional's instructions.

## 2023-09-27 NOTE — TELEPHONE ENCOUNTER
LVM tp go over test results.   ----- Message from Dave Upton MD sent at 9/27/2023  1:12 PM CDT -----  The LDL-C target is less than 100. Need to review compliance with pravastatin 10 mg and encourage healthy living habits with heart healthy diet and regular exercise. Can consider doubling the statin dose and repeat lipid panel in 1-3 months. Thanks,    Dr. Upton     ----- Message -----  From: [place], [facility]  Sent: 9/27/2023   1:05 PM CDT  To: Dave Upton MD

## 2023-09-28 ENCOUNTER — PATIENT MESSAGE (OUTPATIENT)
Dept: TRANSPLANT | Facility: CLINIC | Age: 53
End: 2023-09-28
Payer: MEDICARE

## 2023-09-28 DIAGNOSIS — R18.8 OTHER ASCITES: ICD-10-CM

## 2023-09-28 DIAGNOSIS — Z94.4 S/P LIVER TRANSPLANT: Chronic | ICD-10-CM

## 2023-09-28 DIAGNOSIS — Z94.4 LIVER REPLACED BY TRANSPLANT: ICD-10-CM

## 2023-09-28 LAB — TACROLIMUS BLD-MCNC: 14.9 NG/ML (ref 5–15)

## 2023-09-28 RX ORDER — TACROLIMUS 0.5 MG/1
0.5 CAPSULE ORAL EVERY 12 HOURS
Qty: 180 CAPSULE | Refills: 3 | Status: SHIPPED | OUTPATIENT
Start: 2023-09-28

## 2023-09-28 NOTE — TELEPHONE ENCOUNTER
----- Message from Jaya Nielsen MD sent at 9/28/2023 12:15 PM CDT -----  Results reviewed. Reduce tac to 0.5 mg bid

## 2023-09-28 NOTE — TELEPHONE ENCOUNTER
Sent message to patient with change and to repeat labs on 10/23/23    ----- Message from Jaya Nielsen MD sent at 9/28/2023 12:15 PM CDT -----  Results reviewed. Reduce tac to 0.5 mg bid

## 2023-10-13 ENCOUNTER — HOSPITAL ENCOUNTER (OUTPATIENT)
Dept: CARDIOLOGY | Facility: HOSPITAL | Age: 53
Discharge: HOME | End: 2023-10-13
Attending: INTERNAL MEDICINE
Payer: MEDICARE

## 2023-10-13 ENCOUNTER — HOSPITAL ENCOUNTER (OUTPATIENT)
Dept: RADIOLOGY | Facility: HOSPITAL | Age: 53
Discharge: HOME | End: 2023-10-13
Attending: INTERNAL MEDICINE
Payer: MEDICARE

## 2023-10-13 VITALS — HEIGHT: 59 IN | WEIGHT: 115 LBS | BODY MASS INDEX: 23.18 KG/M2

## 2023-10-13 DIAGNOSIS — I34.1 MVP (MITRAL VALVE PROLAPSE): ICD-10-CM

## 2023-10-13 DIAGNOSIS — R59.1 LYMPHADENOPATHY OF HEAD AND NECK: ICD-10-CM

## 2023-10-13 DIAGNOSIS — I36.1 NON-RHEUMATIC TRICUSPID VALVE INSUFFICIENCY: ICD-10-CM

## 2023-10-13 DIAGNOSIS — I34.0 NONRHEUMATIC MITRAL VALVE REGURGITATION: ICD-10-CM

## 2023-10-13 DIAGNOSIS — Z12.31 ENCOUNTER FOR SCREENING MAMMOGRAM FOR BREAST CANCER: ICD-10-CM

## 2023-10-13 DIAGNOSIS — R06.09 DOE (DYSPNEA ON EXERTION): ICD-10-CM

## 2023-10-13 LAB
AORTIC ROOT ANNULUS: 2.83 CM
AORTIC VALVE CUSP SEPERATION: 1.9 CM
ASCENDING AORTA: 1.84 CM
AV INDEX (PROSTH): 0.79
AV MEAN GRADIENT: 7 MMHG
AV PEAK GRADIENT: 12 MMHG
AV REGURGITATION PRESSURE HALF TIME: 801.41 MS
AV VALVE AREA BY VELOCITY RATIO: 1.86 CM²
AV VALVE AREA: 1.84 CM²
AV VELOCITY RATIO: 0.8
BSA FOR ECHO PROCEDURE: 1.47 M2
CV ECHO LV RWT: 0.63 CM
DOP CALC AO PEAK VEL: 1.71 M/S
DOP CALC AO VTI: 40.8 CM
DOP CALC LVOT AREA: 2.3 CM2
DOP CALC LVOT DIAMETER: 1.72 CM
DOP CALC LVOT PEAK VEL: 1.37 M/S
DOP CALC LVOT STROKE VOLUME: 75.01 CM3
DOP CALCLVOT PEAK VEL VTI: 32.3 CM
E WAVE DECELERATION TIME: 216.94 MSEC
E/A RATIO: 1.25
E/E' RATIO: 16.15 M/S
ECHO LV POSTERIOR WALL: 1.35 CM (ref 0.6–1.1)
EJECTION FRACTION: 65 %
FRACTIONAL SHORTENING: 35 % (ref 28–44)
GLOBAL LONGITUIDAL STRAIN: 20 %
INTERVENTRICULAR SEPTUM: 1.16 CM (ref 0.6–1.1)
LA MAJOR: 5.23 CM
LA MINOR: 3 CM
LEFT ATRIUM SIZE: 3.87 CM
LEFT INTERNAL DIMENSION IN SYSTOLE: 2.8 CM (ref 2.1–4)
LEFT VENTRICLE DIASTOLIC VOLUME INDEX: 56.96 ML/M2
LEFT VENTRICLE DIASTOLIC VOLUME: 83.16 ML
LEFT VENTRICLE MASS INDEX: 135 G/M2
LEFT VENTRICLE SYSTOLIC VOLUME INDEX: 20.2 ML/M2
LEFT VENTRICLE SYSTOLIC VOLUME: 29.52 ML
LEFT VENTRICULAR INTERNAL DIMENSION IN DIASTOLE: 4.3 CM (ref 3.5–6)
LEFT VENTRICULAR MASS: 197.21 G
LV LATERAL E/E' RATIO: 21 M/S
LV SEPTAL E/E' RATIO: 13.13 M/S
LVOT MG: 3.98 MMHG
LVOT MV: 0.94 CM/S
MV PEAK A VEL: 0.84 M/S
MV PEAK E VEL: 1.05 M/S
MV STENOSIS PRESSURE HALF TIME: 80.53 MS
MV VALVE AREA P 1/2 METHOD: 2.73 CM2
PISA AR MAX VEL: 4.1 M/S
PISA MRMAX VEL: 4.39 M/S
PISA TR MAX VEL: 2.15 M/S
PV MV: 0.81 M/S
PV PEAK GRADIENT: 4 MMHG
PV PEAK VELOCITY: 1.05 M/S
RA MAJOR: 4.32 CM
RA PRESSURE ESTIMATED: 3 MMHG
RA WIDTH: 2.11 CM
RIGHT VENTRICULAR END-DIASTOLIC DIMENSION: 2.07 CM
RV TB RVSP: 5 MMHG
SINUS: 2.3 CM
STJ: 2.31 CM
TDI LATERAL: 0.05 M/S
TDI SEPTAL: 0.08 M/S
TDI: 0.07 M/S
TR MAX PG: 18 MMHG
TRICUSPID ANNULAR PLANE SYSTOLIC EXCURSION: 2.1 CM
TV REST PULMONARY ARTERY PRESSURE: 21 MMHG
Z-SCORE OF LEFT VENTRICULAR DIMENSION IN END DIASTOLE: -0.22
Z-SCORE OF LEFT VENTRICULAR DIMENSION IN END SYSTOLE: 0.21

## 2023-10-13 PROCEDURE — 76536 US EXAM OF HEAD AND NECK: CPT | Mod: TC

## 2023-10-13 PROCEDURE — 77067 MAMMO DIGITAL SCREENING BILAT WITH TOMO: ICD-10-PCS | Mod: 26,,, | Performed by: RADIOLOGY

## 2023-10-13 PROCEDURE — 77063 MAMMO DIGITAL SCREENING BILAT WITH TOMO: ICD-10-PCS | Mod: 26,,, | Performed by: RADIOLOGY

## 2023-10-13 PROCEDURE — 77063 BREAST TOMOSYNTHESIS BI: CPT | Mod: 26,,, | Performed by: RADIOLOGY

## 2023-10-13 PROCEDURE — 93306 ECHO (CUPID ONLY): ICD-10-PCS | Mod: 26,,, | Performed by: INTERNAL MEDICINE

## 2023-10-13 PROCEDURE — 93306 TTE W/DOPPLER COMPLETE: CPT

## 2023-10-13 PROCEDURE — 77067 SCR MAMMO BI INCL CAD: CPT | Mod: 26,,, | Performed by: RADIOLOGY

## 2023-10-13 PROCEDURE — 76536 US EXAM OF HEAD AND NECK: CPT | Mod: 26,,, | Performed by: RADIOLOGY

## 2023-10-13 PROCEDURE — 93306 TTE W/DOPPLER COMPLETE: CPT | Mod: 26,,, | Performed by: INTERNAL MEDICINE

## 2023-10-13 PROCEDURE — 76536 US SOFT TISSUE HEAD NECK THYROID: ICD-10-PCS | Mod: 26,,, | Performed by: RADIOLOGY

## 2023-10-13 PROCEDURE — 77067 SCR MAMMO BI INCL CAD: CPT | Mod: TC

## 2023-10-15 DIAGNOSIS — N17.9 ACUTE KIDNEY INJURY: Primary | ICD-10-CM

## 2023-10-20 ENCOUNTER — PATIENT MESSAGE (OUTPATIENT)
Dept: TRANSPLANT | Facility: CLINIC | Age: 53
End: 2023-10-20
Payer: MEDICARE

## 2023-10-23 ENCOUNTER — PATIENT MESSAGE (OUTPATIENT)
Dept: TRANSPLANT | Facility: CLINIC | Age: 53
End: 2023-10-23
Payer: MEDICARE

## 2023-11-02 ENCOUNTER — TELEPHONE (OUTPATIENT)
Dept: NEUROLOGY | Facility: CLINIC | Age: 53
End: 2023-11-02
Payer: MEDICARE

## 2023-11-02 DIAGNOSIS — R11.0 NAUSEA: ICD-10-CM

## 2023-11-02 RX ORDER — PROMETHAZINE HYDROCHLORIDE 25 MG/1
25 TABLET ORAL EVERY 6 HOURS PRN
Qty: 20 TABLET | Refills: 0 | Status: ON HOLD | OUTPATIENT
Start: 2023-11-02 | End: 2023-12-24

## 2023-11-02 RX ORDER — BUTALBITAL AND ACETAMINOPHEN 325; 50 MG/1; MG/1
TABLET ORAL
Qty: 60 TABLET | Refills: 0 | Status: SHIPPED | OUTPATIENT
Start: 2023-11-02 | End: 2024-01-18

## 2023-11-02 RX ORDER — ONDANSETRON 4 MG/1
TABLET, ORALLY DISINTEGRATING ORAL
Qty: 30 TABLET | Refills: 0 | Status: ON HOLD | OUTPATIENT
Start: 2023-11-02 | End: 2023-12-24

## 2023-11-02 NOTE — TELEPHONE ENCOUNTER
Spoke to the pt, informed her that there was no way Dr. Ott can address all the issues she has listed at her first visit.  Pt would prefer to evaluate memory and if possible tremors first.

## 2023-11-06 ENCOUNTER — LAB VISIT (OUTPATIENT)
Dept: LAB | Facility: HOSPITAL | Age: 53
End: 2023-11-06
Attending: INTERNAL MEDICINE
Payer: MEDICARE

## 2023-11-06 ENCOUNTER — OFFICE VISIT (OUTPATIENT)
Dept: NEUROLOGY | Facility: CLINIC | Age: 53
End: 2023-11-06
Payer: MEDICARE

## 2023-11-06 ENCOUNTER — TELEPHONE (OUTPATIENT)
Dept: NEUROLOGY | Facility: CLINIC | Age: 53
End: 2023-11-06
Payer: MEDICARE

## 2023-11-06 VITALS
HEART RATE: 67 BPM | SYSTOLIC BLOOD PRESSURE: 142 MMHG | BODY MASS INDEX: 23 KG/M2 | DIASTOLIC BLOOD PRESSURE: 64 MMHG | WEIGHT: 114.06 LBS | HEIGHT: 59 IN

## 2023-11-06 DIAGNOSIS — K76.82 HEPATIC ENCEPHALOPATHY: ICD-10-CM

## 2023-11-06 DIAGNOSIS — N17.9 ACUTE KIDNEY INJURY: ICD-10-CM

## 2023-11-06 DIAGNOSIS — Z94.4 LIVER REPLACED BY TRANSPLANT: ICD-10-CM

## 2023-11-06 DIAGNOSIS — E83.42 HYPOMAGNESEMIA: ICD-10-CM

## 2023-11-06 DIAGNOSIS — R41.3 MEMORY LOSS: Primary | ICD-10-CM

## 2023-11-06 LAB
ALBUMIN SERPL BCP-MCNC: 2.8 G/DL (ref 3.5–5.2)
ALP SERPL-CCNC: 579 U/L (ref 55–135)
ALT SERPL W/O P-5'-P-CCNC: 56 U/L (ref 10–44)
ANION GAP SERPL CALC-SCNC: 6 MMOL/L (ref 8–16)
ANION GAP SERPL CALC-SCNC: 6 MMOL/L (ref 8–16)
AST SERPL-CCNC: 92 U/L (ref 10–40)
BASOPHILS # BLD AUTO: 0.03 K/UL (ref 0–0.2)
BASOPHILS NFR BLD: 1.3 % (ref 0–1.9)
BILIRUB SERPL-MCNC: 2.8 MG/DL (ref 0.1–1)
BUN SERPL-MCNC: 19 MG/DL (ref 6–20)
BUN SERPL-MCNC: 19 MG/DL (ref 6–20)
CALCIUM SERPL-MCNC: 8.7 MG/DL (ref 8.7–10.5)
CALCIUM SERPL-MCNC: 8.7 MG/DL (ref 8.7–10.5)
CHLORIDE SERPL-SCNC: 108 MMOL/L (ref 95–110)
CHLORIDE SERPL-SCNC: 108 MMOL/L (ref 95–110)
CO2 SERPL-SCNC: 26 MMOL/L (ref 23–29)
CO2 SERPL-SCNC: 26 MMOL/L (ref 23–29)
CREAT SERPL-MCNC: 0.9 MG/DL (ref 0.5–1.4)
CREAT SERPL-MCNC: 0.9 MG/DL (ref 0.5–1.4)
DIFFERENTIAL METHOD: ABNORMAL
EOSINOPHIL # BLD AUTO: 0.2 K/UL (ref 0–0.5)
EOSINOPHIL NFR BLD: 7.5 % (ref 0–8)
ERYTHROCYTE [DISTWIDTH] IN BLOOD BY AUTOMATED COUNT: 15.1 % (ref 11.5–14.5)
EST. GFR  (NO RACE VARIABLE): >60 ML/MIN/1.73 M^2
EST. GFR  (NO RACE VARIABLE): >60 ML/MIN/1.73 M^2
GLUCOSE SERPL-MCNC: 88 MG/DL (ref 70–110)
GLUCOSE SERPL-MCNC: 88 MG/DL (ref 70–110)
HCT VFR BLD AUTO: 31.8 % (ref 37–48.5)
HGB BLD-MCNC: 10.2 G/DL (ref 12–16)
IMM GRANULOCYTES # BLD AUTO: 0 K/UL (ref 0–0.04)
IMM GRANULOCYTES NFR BLD AUTO: 0 % (ref 0–0.5)
INR PPP: 1.2 (ref 0.8–1.2)
LYMPHOCYTES # BLD AUTO: 0.5 K/UL (ref 1–4.8)
LYMPHOCYTES NFR BLD: 22.1 % (ref 18–48)
MAGNESIUM SERPL-MCNC: 1.7 MG/DL (ref 1.6–2.6)
MCH RBC QN AUTO: 30.7 PG (ref 27–31)
MCHC RBC AUTO-ENTMCNC: 32.1 G/DL (ref 32–36)
MCV RBC AUTO: 96 FL (ref 82–98)
MONOCYTES # BLD AUTO: 0.2 K/UL (ref 0.3–1)
MONOCYTES NFR BLD: 9.2 % (ref 4–15)
NEUTROPHILS # BLD AUTO: 1.4 K/UL (ref 1.8–7.7)
NEUTROPHILS NFR BLD: 59.9 % (ref 38–73)
NRBC BLD-RTO: 0 /100 WBC
PLATELET # BLD AUTO: 53 K/UL (ref 150–450)
PMV BLD AUTO: 12.6 FL (ref 9.2–12.9)
POTASSIUM SERPL-SCNC: 4.3 MMOL/L (ref 3.5–5.1)
POTASSIUM SERPL-SCNC: 4.3 MMOL/L (ref 3.5–5.1)
PROT SERPL-MCNC: 6.3 G/DL (ref 6–8.4)
PROTHROMBIN TIME: 12.9 SEC (ref 9–12.5)
RBC # BLD AUTO: 3.32 M/UL (ref 4–5.4)
SODIUM SERPL-SCNC: 140 MMOL/L (ref 136–145)
SODIUM SERPL-SCNC: 140 MMOL/L (ref 136–145)
WBC # BLD AUTO: 2.4 K/UL (ref 3.9–12.7)

## 2023-11-06 PROCEDURE — 3008F BODY MASS INDEX DOCD: CPT | Mod: CPTII,S$GLB,, | Performed by: PSYCHIATRY & NEUROLOGY

## 2023-11-06 PROCEDURE — 1159F MED LIST DOCD IN RCRD: CPT | Mod: CPTII,S$GLB,, | Performed by: PSYCHIATRY & NEUROLOGY

## 2023-11-06 PROCEDURE — 85610 PROTHROMBIN TIME: CPT | Mod: PO | Performed by: INTERNAL MEDICINE

## 2023-11-06 PROCEDURE — 80197 ASSAY OF TACROLIMUS: CPT | Performed by: INTERNAL MEDICINE

## 2023-11-06 PROCEDURE — 99215 OFFICE O/P EST HI 40 MIN: CPT | Mod: S$GLB,,, | Performed by: PSYCHIATRY & NEUROLOGY

## 2023-11-06 PROCEDURE — 3078F DIAST BP <80 MM HG: CPT | Mod: CPTII,S$GLB,, | Performed by: PSYCHIATRY & NEUROLOGY

## 2023-11-06 PROCEDURE — 3077F PR MOST RECENT SYSTOLIC BLOOD PRESSURE >= 140 MM HG: ICD-10-PCS | Mod: CPTII,S$GLB,, | Performed by: PSYCHIATRY & NEUROLOGY

## 2023-11-06 PROCEDURE — 3078F PR MOST RECENT DIASTOLIC BLOOD PRESSURE < 80 MM HG: ICD-10-PCS | Mod: CPTII,S$GLB,, | Performed by: PSYCHIATRY & NEUROLOGY

## 2023-11-06 PROCEDURE — 80053 COMPREHEN METABOLIC PANEL: CPT | Performed by: INTERNAL MEDICINE

## 2023-11-06 PROCEDURE — 99999 PR PBB SHADOW E&M-EST. PATIENT-LVL V: ICD-10-PCS | Mod: PBBFAC,,, | Performed by: PSYCHIATRY & NEUROLOGY

## 2023-11-06 PROCEDURE — 85025 COMPLETE CBC W/AUTO DIFF WBC: CPT | Performed by: INTERNAL MEDICINE

## 2023-11-06 PROCEDURE — 99215 PR OFFICE/OUTPT VISIT, EST, LEVL V, 40-54 MIN: ICD-10-PCS | Mod: S$GLB,,, | Performed by: PSYCHIATRY & NEUROLOGY

## 2023-11-06 PROCEDURE — 99999 PR PBB SHADOW E&M-EST. PATIENT-LVL V: CPT | Mod: PBBFAC,,, | Performed by: PSYCHIATRY & NEUROLOGY

## 2023-11-06 PROCEDURE — 3077F SYST BP >= 140 MM HG: CPT | Mod: CPTII,S$GLB,, | Performed by: PSYCHIATRY & NEUROLOGY

## 2023-11-06 PROCEDURE — 99417 PR PROLONGED SVC, OUTPT, W/WO DIRECT PT CONTACT,  EA ADDTL 15 MIN: ICD-10-PCS | Mod: S$GLB,,, | Performed by: PSYCHIATRY & NEUROLOGY

## 2023-11-06 PROCEDURE — 4010F ACE/ARB THERAPY RXD/TAKEN: CPT | Mod: CPTII,S$GLB,, | Performed by: PSYCHIATRY & NEUROLOGY

## 2023-11-06 PROCEDURE — 4010F PR ACE/ARB THEARPY RXD/TAKEN: ICD-10-PCS | Mod: CPTII,S$GLB,, | Performed by: PSYCHIATRY & NEUROLOGY

## 2023-11-06 PROCEDURE — 1160F RVW MEDS BY RX/DR IN RCRD: CPT | Mod: CPTII,S$GLB,, | Performed by: PSYCHIATRY & NEUROLOGY

## 2023-11-06 PROCEDURE — 99417 PROLNG OP E/M EACH 15 MIN: CPT | Mod: S$GLB,,, | Performed by: PSYCHIATRY & NEUROLOGY

## 2023-11-06 PROCEDURE — 1159F PR MEDICATION LIST DOCUMENTED IN MEDICAL RECORD: ICD-10-PCS | Mod: CPTII,S$GLB,, | Performed by: PSYCHIATRY & NEUROLOGY

## 2023-11-06 PROCEDURE — 3008F PR BODY MASS INDEX (BMI) DOCUMENTED: ICD-10-PCS | Mod: CPTII,S$GLB,, | Performed by: PSYCHIATRY & NEUROLOGY

## 2023-11-06 PROCEDURE — 36415 COLL VENOUS BLD VENIPUNCTURE: CPT | Mod: PO | Performed by: INTERNAL MEDICINE

## 2023-11-06 PROCEDURE — 83735 ASSAY OF MAGNESIUM: CPT | Performed by: INTERNAL MEDICINE

## 2023-11-06 PROCEDURE — 1160F PR REVIEW ALL MEDS BY PRESCRIBER/CLIN PHARMACIST DOCUMENTED: ICD-10-PCS | Mod: CPTII,S$GLB,, | Performed by: PSYCHIATRY & NEUROLOGY
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
## 2023-11-07 ENCOUNTER — PATIENT MESSAGE (OUTPATIENT)
Dept: FAMILY MEDICINE | Facility: CLINIC | Age: 53
End: 2023-11-07
Payer: MEDICARE

## 2023-11-07 ENCOUNTER — PATIENT MESSAGE (OUTPATIENT)
Dept: TRANSPLANT | Facility: CLINIC | Age: 53
End: 2023-11-07
Payer: MEDICARE

## 2023-11-07 ENCOUNTER — TELEPHONE (OUTPATIENT)
Dept: TRANSPLANT | Facility: CLINIC | Age: 53
End: 2023-11-07
Payer: MEDICARE

## 2023-11-07 LAB — TACROLIMUS BLD-MCNC: 7.1 NG/ML (ref 5–15)

## 2023-11-07 NOTE — TELEPHONE ENCOUNTER
Sent patient message via portal to let her know labs are stable.  No medication changes, next labs due on 12/02/23    ----- Message from Jaya Nielsen MD sent at 11/7/2023  9:11 AM CST -----  Results reviewed. No action.

## 2023-11-14 ENCOUNTER — OFFICE VISIT (OUTPATIENT)
Dept: NEUROLOGY | Facility: CLINIC | Age: 53
End: 2023-11-14
Payer: MEDICARE

## 2023-11-14 ENCOUNTER — TELEPHONE (OUTPATIENT)
Dept: NEUROLOGY | Facility: CLINIC | Age: 53
End: 2023-11-14
Payer: MEDICARE

## 2023-11-14 ENCOUNTER — HOSPITAL ENCOUNTER (OUTPATIENT)
Dept: RADIOLOGY | Facility: HOSPITAL | Age: 53
Discharge: HOME | End: 2023-11-14
Attending: INTERNAL MEDICINE
Payer: MEDICARE

## 2023-11-14 VITALS
WEIGHT: 118.63 LBS | DIASTOLIC BLOOD PRESSURE: 64 MMHG | TEMPERATURE: 96 F | HEIGHT: 59 IN | BODY MASS INDEX: 23.92 KG/M2 | RESPIRATION RATE: 17 BRPM | SYSTOLIC BLOOD PRESSURE: 123 MMHG | HEART RATE: 59 BPM

## 2023-11-14 DIAGNOSIS — G44.229 CHRONIC TENSION-TYPE HEADACHE, NOT INTRACTABLE: ICD-10-CM

## 2023-11-14 DIAGNOSIS — G43.719 INTRACTABLE CHRONIC MIGRAINE WITHOUT AURA AND WITHOUT STATUS MIGRAINOSUS: Primary | ICD-10-CM

## 2023-11-14 DIAGNOSIS — R11.0 NAUSEA: ICD-10-CM

## 2023-11-14 DIAGNOSIS — R92.8 ABNORMAL MAMMOGRAM: ICD-10-CM

## 2023-11-14 DIAGNOSIS — Z86.73 HISTORY OF CVA (CEREBROVASCULAR ACCIDENT): ICD-10-CM

## 2023-11-14 PROCEDURE — 3074F SYST BP LT 130 MM HG: CPT | Mod: CPTII,S$GLB,, | Performed by: NURSE PRACTITIONER

## 2023-11-14 PROCEDURE — 1160F RVW MEDS BY RX/DR IN RCRD: CPT | Mod: CPTII,S$GLB,, | Performed by: NURSE PRACTITIONER

## 2023-11-14 PROCEDURE — 99417 PROLNG OP E/M EACH 15 MIN: CPT | Mod: S$GLB,,, | Performed by: NURSE PRACTITIONER

## 2023-11-14 PROCEDURE — 1160F PR REVIEW ALL MEDS BY PRESCRIBER/CLIN PHARMACIST DOCUMENTED: ICD-10-PCS | Mod: CPTII,S$GLB,, | Performed by: NURSE PRACTITIONER

## 2023-11-14 PROCEDURE — 77066 DX MAMMO INCL CAD BI: CPT | Mod: 26,,, | Performed by: RADIOLOGY

## 2023-11-14 PROCEDURE — 77066 DX MAMMO INCL CAD BI: CPT | Mod: TC,PO

## 2023-11-14 PROCEDURE — 3078F PR MOST RECENT DIASTOLIC BLOOD PRESSURE < 80 MM HG: ICD-10-PCS | Mod: CPTII,S$GLB,, | Performed by: NURSE PRACTITIONER

## 2023-11-14 PROCEDURE — 1159F MED LIST DOCD IN RCRD: CPT | Mod: CPTII,S$GLB,, | Performed by: NURSE PRACTITIONER

## 2023-11-14 PROCEDURE — 99215 PR OFFICE/OUTPT VISIT, EST, LEVL V, 40-54 MIN: ICD-10-PCS | Mod: S$GLB,,, | Performed by: NURSE PRACTITIONER

## 2023-11-14 PROCEDURE — 99999 PR PBB SHADOW E&M-EST. PATIENT-LVL V: CPT | Mod: PBBFAC,,, | Performed by: NURSE PRACTITIONER

## 2023-11-14 PROCEDURE — 4010F ACE/ARB THERAPY RXD/TAKEN: CPT | Mod: CPTII,S$GLB,, | Performed by: NURSE PRACTITIONER

## 2023-11-14 PROCEDURE — 3074F PR MOST RECENT SYSTOLIC BLOOD PRESSURE < 130 MM HG: ICD-10-PCS | Mod: CPTII,S$GLB,, | Performed by: NURSE PRACTITIONER

## 2023-11-14 PROCEDURE — 1159F PR MEDICATION LIST DOCUMENTED IN MEDICAL RECORD: ICD-10-PCS | Mod: CPTII,S$GLB,, | Performed by: NURSE PRACTITIONER

## 2023-11-14 PROCEDURE — 77066 MAMMO DIGITAL DIAGNOSTIC BILAT WITH TOMO: ICD-10-PCS | Mod: 26,,, | Performed by: RADIOLOGY

## 2023-11-14 PROCEDURE — 99417 PR PROLONGED SVC, OUTPT, W/WO DIRECT PT CONTACT,  EA ADDTL 15 MIN: ICD-10-PCS | Mod: S$GLB,,, | Performed by: NURSE PRACTITIONER

## 2023-11-14 PROCEDURE — 99999 PR PBB SHADOW E&M-EST. PATIENT-LVL V: ICD-10-PCS | Mod: PBBFAC,,, | Performed by: NURSE PRACTITIONER

## 2023-11-14 PROCEDURE — 4010F PR ACE/ARB THEARPY RXD/TAKEN: ICD-10-PCS | Mod: CPTII,S$GLB,, | Performed by: NURSE PRACTITIONER

## 2023-11-14 PROCEDURE — 3008F PR BODY MASS INDEX (BMI) DOCUMENTED: ICD-10-PCS | Mod: CPTII,S$GLB,, | Performed by: NURSE PRACTITIONER

## 2023-11-14 PROCEDURE — 99215 OFFICE O/P EST HI 40 MIN: CPT | Mod: S$GLB,,, | Performed by: NURSE PRACTITIONER

## 2023-11-14 PROCEDURE — 77062 BREAST TOMOSYNTHESIS BI: CPT | Mod: 26,,, | Performed by: RADIOLOGY

## 2023-11-14 PROCEDURE — 3078F DIAST BP <80 MM HG: CPT | Mod: CPTII,S$GLB,, | Performed by: NURSE PRACTITIONER

## 2023-11-14 PROCEDURE — 3008F BODY MASS INDEX DOCD: CPT | Mod: CPTII,S$GLB,, | Performed by: NURSE PRACTITIONER

## 2023-11-14 PROCEDURE — 77062 MAMMO DIGITAL DIAGNOSTIC BILAT WITH TOMO: ICD-10-PCS | Mod: 26,,, | Performed by: RADIOLOGY

## 2023-11-14 RX ORDER — UBROGEPANT 50 MG/1
TABLET ORAL
Qty: 10 TABLET | Refills: 11 | Status: SHIPPED | OUTPATIENT
Start: 2023-11-14

## 2023-11-14 RX ORDER — PROCHLORPERAZINE MALEATE 10 MG
10 TABLET ORAL EVERY 6 HOURS PRN
Qty: 60 TABLET | Refills: 11 | Status: SHIPPED | OUTPATIENT
Start: 2023-11-14

## 2023-11-14 NOTE — PROGRESS NOTES
Date of service: 11/14/2023  Referring provider: Dr. David Lorenzo    Subjective:      Chief complaint: Headache       Patient ID: Elda Hernandez is a 53 y.o. female with anxiety, chronic constipation, cirrhosis of liver with ascites s/p liver transplant, depression, HTN, hypothyroidism, history of CVA x2, chiari malformation with suboccipital craniotomy who presents for new patient evaluation of headache     She has seen Dr. Ott for memory issues    History of Present Illness    ORIGINAL HEADACHE HISTORY - 11/14/23  Age at onset and course over time: reports long history of headaches over the past 25 years worsening after liver transplant in 2002.  Family history of migraines - unknown  Last eye exam - 1 year ago  Location: varies, typically starts unilateral   Quality:  [x] pressure [x] tight [x] throbbing [x] sharp [] stabbing   Severity: current 2 with range 0-6  Duration: hours, days, constant  Frequency: daily  Headaches awaken at night?:  yes  Worst time of day: mid-day  Associated with: [x] photophobia [x]  phonophobia [x] osmophobia [x] blurred vision  [] double vision [x] loss of appetite [x] nausea [x] vomiting [x] dizziness [] vertigo  [] tinnitus [x] irritability [] sinus pressure [] problems with concentration   [x] neck tightness   Alleviated by:  [x] sleep [x] darkness [x] massage [] heat [x] ice [x] medication  Exacerbated by:  [x] fatigue [x] light [x] noise [x] smells [x] coughing [] sneezing  [x] bending over [] ovulation [] menses [] alcohol [] change in weather [x]  stress  Ipsilateral autonomic: [] nasal congestion [] lacrimation [] ptosis [] injection [] edema [] foreign body sensation [] ear fullness   ICP:  [] transient visual obscurations  [x] tinnitus high pitched, bilateral   [] positional headache  [x] non-positional   Sleep habits: trouble falling asleep, trouble staying asleep, unrefreshing sleep  Caffeine intake: none   Gyn status (if female): hysterectomy around age 20 due to  excessive bleeding secondary to liver disease   HIT 6: 61    Current acute treatment:  Clonazepam  Phenergan  Zofran  Fioricet - 30-60 per month per  - 3-4 days per week    Current prevention:  Venlafaxine  Ramipril  Spironolactone  Lasix  Magnesium    Previously tried/failed acute treatment:  Rizatriptan - cardiology told her to stop due to MVP with regurg and tricuspid valve prolapse with regurg   NSAIDs - contraindicated due to liver disease    Previously tried/failed preventative treatment:  Zoloft - allergy   Vimpat  Gabapentin  Topamax  Zyprexa  Amitriptyline  Botox - 4-5 sessions over 10 years ago, helped some    Review of patient's allergies indicates:   Allergen Reactions    Codeine Itching     Other reaction(s): Itching    Lipitor [atorvastatin] Other (See Comments)     Other reaction(s): Muscle pain  Muscle cranmps    Morphine Itching     Other reaction(s): nausea and vomiting     Zoloft [sertraline] Other (See Comments)     Tremors/muscle spasms     Current Outpatient Medications   Medication Sig Dispense Refill    acetaminophen (TYLENOL) 325 MG tablet Take 2 tablets (650 mg total) by mouth every 6 (six) hours as needed.  0    butalbitaL-acetaminophen  mg Tab TAKE 1 TABLET BY MOUTH TWICE DAILY AS NEEDED FOR HEADACHE 60 tablet 0    calcium carbonate (TUMS) 200 mg calcium (500 mg) chewable tablet Take 1 tablet (500 mg total) by mouth 2 (two) times daily as needed. (Patient taking differently: Take 1,500 mg by mouth daily as needed for Heartburn.)      calcium phosphate trib/vit D3 (CALTRATE GUMMY BITES ORAL) Take by mouth.      clonazePAM (KLONOPIN) 0.5 MG tablet Take 1 tablet (0.5 mg total) by mouth 2 (two) times daily. 60 tablet 2    DENAVIR 1 % cream RICHAR EXT AA Q 2 H FOR 4 DAYS 5 g 3    fluorometholone 0.1% (FML) 0.1 % DrpS Place 2 drops into the left eye 2 (two) times daily.      furosemide (LASIX) 20 MG tablet  90 tablet 3    hydrocortisone 2.5 % cream Apply topically to affected area twice  daily as needed      lactulose (CHRONULAC) 10 gram/15 mL solution Take 15 mLs (10 g total) by mouth 3 (three) times daily. (Patient taking differently: Take 10 g by mouth 3 (three) times daily. As needed) 1892 mL 6    levothyroxine (SYNTHROID) 75 MCG tablet Take 1 tablet (75 mcg total) by mouth once daily. 90 tablet 3    magnesium oxide (MAG-OX) 400 mg (241.3 mg magnesium) tablet TAKE 1 TABLET(400 MG) BY MOUTH TWICE DAILY 180 tablet 3    multivitamin capsule Take 1 capsule by mouth once daily.      multivitamin capsule Take 1 capsule by mouth once daily.      ondansetron (ZOFRAN-ODT) 4 MG TbDL DISSOLVE 1 TABLET(4 MG) ON THE TONGUE EVERY 6 HOURS AS NEEDED 30 tablet 0    polyethylene glycol (GLYCOLAX) 17 gram/dose powder MIX 17 GRAMS (1 capful) IN LIQUID AND DRINK BY MOUTH TWICE DAILY (Patient taking differently: Take 17 g by mouth 2 (two) times daily. As needed) 1020 g 2    pravastatin (PRAVACHOL) 20 MG tablet Take 1 tablet (20 mg total) by mouth once daily. 90 tablet 3    promethazine (PHENERGAN) 25 MG tablet TAKE 1 TABLET(25 MG) BY MOUTH EVERY 6 HOURS AS NEEDED FOR NAUSEA 20 tablet 0    ramipriL (ALTACE) 5 MG capsule Take 1 capsule (5 mg total) by mouth once daily. 90 capsule 3    rifAXIMin (XIFAXAN) 550 mg Tab Take 1 tablet (550 mg total) by mouth 2 (two) times daily. 60 tablet 11    spironolactone (ALDACTONE) 50 MG tablet  90 tablet 3    tacrolimus (PROGRAF) 0.5 MG Cap Take 1 capsule (0.5 mg total) by mouth every 12 (twelve) hours. 180 capsule 3    valACYclovir (VALTREX) 1000 MG tablet TAKE 2 TABLETS(2000 MG) BY MOUTH TWICE DAILY FOR 2 DOSES (Patient taking differently: TAKE 2 TABLETS(2000 MG) BY MOUTH TWICE DAILY FOR 2 DOSES as needed) 4 tablet 0    venlafaxine (EFFEXOR-XR) 150 MG Cp24 Take 1 capsule (150 mg total) by mouth once daily. 90 capsule 3    prochlorperazine (COMPAZINE) 10 MG tablet Take 1 tablet (10 mg total) by mouth every 6 (six) hours as needed (migraine or nausea). 60 tablet 11    SHINGRIX, PF, 50  mcg/0.5 mL injection       ubrogepant (UBRELVY) 50 mg tablet Take 1 tablet po at onset of migraine. May repeat in 2 hours if needed. Max 2 tablets per day. 10 tablet 11     No current facility-administered medications for this visit.       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    Arnold-Chiari malformation     Depression     Esophageal stricture     Essential tremor     Hypertension     Left bundle branch block     Liver fibrosis, transplanted liver 10/2/2018    Suggested on fibroscan 10/2/18    Migraine without aura     MVP (mitral valve prolapse)     Non-rheumatic mitral regurgitation 10/1/2018    Non-rheumatic tricuspid valve insufficiency 10/1/2018    Osteoporosis     Recurrent urinary tract infection     Seizures     Shingles 2007    SIADH (syndrome of inappropriate ADH production)     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    Tricuspid valve prolapse     Urolithiasis     Von Gierke disease     s/p liver transplant       Past Surgical History  Past Surgical History:   Procedure Laterality Date    APPENDECTOMY  6/22/2007    APPLICATION OF WOUND VACUUM-ASSISTED CLOSURE DEVICE N/A 9/18/2020    Procedure: APPLICATION, WOUND VAC;  Surgeon: Zain Decker MD;  Location: [facility] OR [address] [place] [address];  Service: General;  Laterality: N/A;    COLONOSCOPY  5/13/2008    internal hemorrhoids    CRANIOTOMY      ESOPHAGOGASTRODUODENOSCOPY N/A 9/3/2020    Procedure: EGD (ESOPHAGOGASTRODUODENOSCOPY);  Surgeon: Tyrel Vergara MD;  Location: [facility];  Service: Endoscopy;  Laterality: N/A;    EXPLORATORY LAPAROTOMY  2020    due to perforated stomach    LAMINECTOMY  3/2001    LIVER TRANSPLANT  9/23/2002    OSSICULAR RECONSTRUCTION  10/4/1995    RIGHT REPLACEMENT PROSTHESIS for cholesteatoma    THYMECTOMY  5/2/2007    TONSILLECTOMY, ADENOIDECTOMY  1/21/2004    TOTAL ABDOMINAL HYSTERECTOMY  3/31/1994       Family History  Family History   Problem Relation Age of Onset     Stroke Mother     Heart disease Mother     No Known Problems Father        Social History  Social History     Socioeconomic History    Marital status:    Tobacco Use    Smoking status: Never    Smokeless tobacco: Never   Substance and Sexual Activity    Alcohol use: No    Drug use: No     Social Determinants of Health     Transportation Needs: Unmet Transportation Needs (1/5/2021)    PRAPARE - Transportation     Lack of Transportation (Medical): Yes     Lack of Transportation (Non-Medical): Yes   Physical Activity: Insufficiently Active (1/5/2021)    Exercise Vital Sign     Days of Exercise per Week: 3 days     Minutes of Exercise per Session: 30 min        Review of Systems  14-point review of systems as follows:   No check jessica indicates NEGATIVE response   Constitutional: [] weight loss, [] change to appetite   Eyes: [x] change in vision, [] double vision   Ears, nose, mouth, throat: [] frequent nose bleeds, [x] ringing in the ears   Respiratory: [] cough, [] wheezing   Cardiovascular: [] chest pain, [] palpitations   Gastrointestinal: [] jaundice, [] nausea/vomiting   Genitourinary: [] incontinence, [] burning with urination   Hematologic/lymphatic: [x] easy bruising/bleeding, [x] night sweats   Neurological: [] numbness, [x] weakness   Endocrine: [] fatigue, [x] heat/cold intolerance   Allergy/Immunologic: [] fevers, [] chills   Musculoskeletal: [x] muscle pain, [] joint pain   Psychiatric: [] thoughts of harming self/others, [x] depression   Integumentary: [x] rashes, [] sores that do not heal     Objective:        Vitals:    11/14/23 1307   BP: 123/64   Pulse: (!) 59   Resp: 17   Temp: 96 °F (35.6 °C)     Body mass index is 23.96 kg/m².    11/14/23  Constitutional: appears in no acute distress, well-developed, well-nourished. Pale/mild jaundiced coloring. Tired appearance.      Eyes: PERRLA    Ears, nose, mouth, throat: external appearance of ears and nose normal, hearing intact     Cardiovascular:  regular rate and rhythm, no murmurs appreciated    Respiratory: unlabored respirations, breath sounds normal bilaterally    Gastrointestinal: no visible abdominal masses, no guarding, no visible hernia. Distended.     Musculoskeletal: normal tone in all four extremities. No abnormal movements. No pronator drift. No orbit. Symmetric finger tapping. Normal station. Normal regular gait. Unsteady  tandem gait.      Spine:   CERVICAL SPINE:  ROM: mildly restricted    MUSCLE SPASM: bilateral    FACET LOADING: no   SPURLING: no  TISH / YULIANA tender: mild bilateral TISH     Psychiatric: normal judgment and insight. Oriented to person, place, and time.     Neurologic:   Cortical functions: recent and remote memory intact, normal attention span and concentration, speech fluent, adequate fund of knowledge   Cranial nerves: visual fields full, PERRLA, EOMI, symmetric facial strength, hearing intact, palate elevates symmetrically, shoulder shrug 5/5, tongue protrudes midline   Reflexes: 2+ in the upper and lower extremities, no Portillo  Sensation: intact to temperature throughout   Coordination: normal finger to nose, heel to shin    Data Review:     I have personally reviewed the referring provider's notes, labs, & imaging made available to me today.      RADIOLOGY STUDIES:  I have personally reviewed the pertinent images performed.       Results for orders placed or performed during the hospital encounter of 02/28/23   MRI Brain W WO Contrast    Narrative    EXAMINATION:  MRI BRAIN W WO CONTRAST    CLINICAL HISTORY:  Memory loss;Mental status change, unknown cause;    TECHNIQUE:  Multiplanar multisequence MR imaging of the brain was performed before and after the administration of 5 mL Gadavist intravenous contrast.    Examination mildly degraded by patient motion artifact.    COMPARISON:  CT head 02/28/2023, MRI brain 04/24/2015    FINDINGS:  Ventricles are normal in size for age.  No hydrocephalus.    Symmetric T1 hyperintensity  bilateral basal ganglia.  There is also subtle symmetric T2-FLAIR hyperintensity in bilateral basal ganglia.  Question subtle corresponding diffusion restriction also involving the insular cortex.  Punctate multiple small scattered remote bilateral cerebellar infarcts.  Mild chronic small vessel ischemic change.  No recent or remote hemorrhage.  No new intracranial mass effect or midline shift.  No abnormal parenchymal or leptomeningeal enhancement.    No extra-axial blood or fluid collections.    The T2 skull base flow voids are preserved.    Prior suboccipital craniotomy.  Bone marrow signal intensity is otherwise unremarkable.      Impression    Examination mildly degraded by patient motion artifact.    Subtle findings concerning for hepatic encephalopathy as above.  Other toxic/metabolic derangement or encephalitis could have a similar appearance.  For clinical correlation.    No without evidence of acute hemorrhage or major vascular distribution infarct.    Mild chronic small vessel ischemic change with multiple small remote bilateral cerebellar infarcts.    Postsurgical change from prior suboccipital craniotomy.    This report was flagged in Epic as abnormal.      Electronically signed by: Jakob Sarah MD  Date:    03/02/2023  Time:    06:16   CT Head Without Contrast    Narrative    EXAMINATION:  CT HEAD WITHOUT CONTRAST    CLINICAL HISTORY:  Headache, chronic, new features or increased frequency;    TECHNIQUE:  Low dose axial CT images obtained throughout the head without the use of intravenous contrast.  Axial, sagittal and coronal reconstructions were performed.    COMPARISON:  CT 09/25/2020, MRI 04/24/2015    FINDINGS:  Intracranial compartment:    The ventricular configuration appears stable from prior exam without evidence of hydrocephalus.    Brain parenchyma appears unchanged with multifocal parenchymal calcifications stable in distribution compared to the prior exam.  No parenchymal mass,  hemorrhage, edema or major vascular distribution infarct. No extra-axial blood or fluid collections.    Skull/extracranial contents (limited evaluation):    Remote postoperative changes of suboccipital craniectomy.  Prior right mastoidectomy.  Left mastoid air cells are clear.  Visualized paranasal sinuses are clear.      Impression    No evidence of acute intracranial pathology.  Additional evaluation, as clinically warranted.    Stable multifocal parenchymal calcifications, nonspecific and potentially related to remote infectious or metabolic etiology.    Electronically signed by resident: Jakob Monteiro  Date:    02/28/2023  Time:    18:36    Electronically signed by: González Kwok MD  Date:    02/28/2023  Time:    18:54   Results for orders placed or performed during the hospital encounter of 04/15/15   MRI Brain Without Contrast    Narrative    Time of Procedure: 04/24/15 18:41:03  Accession # [id number]    Technique: Limited sequences were obtained through the brain secondary to early patient termination of the examination.    Comparison: MRI brain from 2009.     Findings:  Examination is limited by motion artifact.  There is minimal periventricular white matter T2/FLAIR signal hyperintensity which may be seen with small vessel disease.  No evidence of abnormal restricted diffusion to suggest acute infarction.  Ventricles   are normal in size and configuration.  No evidence of hydrocephalus.  Flow voids are normal in appearance.  No mass or mass-effect.    Visualized paranasal sinuses are clear. Small amount of fluid seen within the right mastoid air cells, commonly seen with eustachian tube dysfunction. Orbits appear normal.    Impression       No acute intracranial abnormality, specifically no evidence of acute infarction, noting incomplete examination secondary to early patient termination of the exam.  ______________________________________     Electronically signed by resident: Mik Bran MD  Date:      04/25/15  Time:    10:42          As the supervising and teaching physician, I personally reviewed the images and resident's interpretation and I agree with the findings.          Electronically signed by: DAMON MACIAS MD  Date:     04/25/15  Time:    10:53      *Note: Due to a large number of results and/or encounters for the requested time period, some results have not been displayed. A complete set of results can be found in Results Review.       Lab Results   Component Value Date     (H) 09/04/2020     11/06/2023     11/06/2023    K 4.3 11/06/2023    K 4.3 11/06/2023    MG 1.7 11/06/2023     11/06/2023     11/06/2023    CO2 26 11/06/2023    CO2 26 11/06/2023    BUN 19 11/06/2023    BUN 19 11/06/2023    CREATININE 0.9 11/06/2023    CREATININE 0.9 11/06/2023    GLU 88 11/06/2023    GLU 88 11/06/2023    HGBA1C 4.3 09/03/2020    AST 92 (H) 11/06/2023    ALT 56 (H) 11/06/2023    ALBUMIN 2.8 (L) 11/06/2023    PROT 6.3 11/06/2023    BILITOT 2.8 (H) 11/06/2023    CHOL 196 09/27/2023    HDL 64 09/27/2023    LDLCALC 114.8 09/27/2023    TRIG 86 09/27/2023       Lab Results   Component Value Date    WBC 2.40 (L) 11/06/2023    HGB 10.2 (L) 11/06/2023    HCT 31.8 (L) 11/06/2023    MCV 96 11/06/2023    PLT 53 (L) 11/06/2023       Lab Results   Component Value Date    TSH 2.096 11/06/2023           Assessment & Plan:       Problem List Items Addressed This Visit          Neuro    HA (headache)    Intractable chronic migraine without aura and without status migrainosus - Primary    Overview     Migraine headaches since at least around age 18 with worsening after liver transplant. Headaches are typically unilateral, moderate to severe in intensity, worsen with activity, pounding in quality and associated with sensitivity to light, smell and sound. Gradual progression pattern, lack of red flag features on history, and normal neurological exam are reassuring for primary as opposed to secondary  etiology of headaches thus imaging will not be pursued for this history and this exam at this time.    The patient has chronic migraines ( G43.719) and suffers from headaches more than 3 months, more than 15 days of headache days per month lasting more than 4 hours with at least 8 attacks that meet criteria for migraine. She has tried multiple medications including but not limited to venlafaxine, ramipril, sertraline, vimpat, gabapentin, Topamax, Zyprexa, amitriptyline   The patient has been unresponsive and refractory.The patient meets criteria for chronic headaches according to the ICHD-II, the patient has more than 15 headaches a month which last for more than 4 hours a day. The patient is an ideal candidate for Botox. After treatment, I expect 50%  improvement in the patient's symptoms. A reduction of at least 7 days per month and the number of cumulative hours suffering with headaches as well as at least 100 total hours affected with migraine per month.  DESCRIPTION OF PROCEDURE: After obtaining informed consent and under aseptic technique, a total of 155 units of botulinum toxin type A to be injected in the following muscles:      -- Procerus 5 units  --  5 units bilaterally  -- Frontalis 20 units  -- Temporalis 20 units bilaterally  -- Occipitalis 15 units bilaterally  -- Upper cervical paraspinals 10 units bilaterally  -- Trapezius 15 units bilaterally.       Unavoidable waste 45 units    Triptans contraindicated due to history of CVA and cardiac history. Add Ubrelvy. Nurtec should be avoided with severe liver impairment. Limit Fioricet to 8-10 days per month.          Relevant Medications    ubrogepant (UBRELVY) 50 mg tablet    Other Relevant Orders    Prior authorization Order     Other Visit Diagnoses       Nausea        Relevant Medications    prochlorperazine (COMPAZINE) 10 MG tablet    History of CVA (cerebrovascular accident)        Patient reports two strokes                Please call our  clinic at [phone] or send a message on the [employer] portal if there are any changes to the plan described below, for example,if you are not contacted for the requested tests, referral(s) within one week, if you are unable to receive the medications prescribed, or if you feel you need to change the treatment course for any reason.     TESTING:  -- none    REFERRALS:  -- none    PREVENTION (use daily regardless of headache):  -- SEEK authorization for Botox  -- continue magnesium and other medications     AS-NEEDED TREATMENT (use total no more than 10 days per month unless otherwise stated):  -- LIMIT butalbital to 8-10 days per month  -- START Ubrelvy with next migraine. You can repeat two hours later if needed. With this medication do not drink grapefruit juice or eat grapefruit or some medications like ketoconazole, itraconazole, or antibiotics clarithromycin   -- start compazine. This is a nausea medication that also has anti-migraine properties. Can take daily and will not contribute to rebound headaches      Follow up in about 3 weeks (around 12/5/2023) for first botox.    Face to Face time with patient: 45  60 minutes of total time spent on the encounter, which includes face to face time and non-face to face time on day of visit preparing to see the patient (eg, review of tests), Obtaining and/or reviewing separately obtained history, Documenting clinical information in the electronic or other health record, Independently interpreting results (not separately reported) and communicating results to the patient/family/caregiver, or Care coordination (not separately reported).     Constance Duffy NP

## 2023-11-14 NOTE — TELEPHONE ENCOUNTER
----- Message from Shimon Montero Patient Care Assistant sent at 11/14/2023  3:38 PM CST -----  Contact: Pt  Type: Return Call    Who Called: Pt  Who Left Message for Pt: Veronika  Does the patient know what this is regarding: Yes/Results  Best Call Back Number: [phone] (home)   Thank you~

## 2023-11-14 NOTE — PATIENT INSTRUCTIONS
Please call our clinic at [phone] or send a message on the [facility] portal if there are any changes to the plan described below, for example,if you are not contacted for the requested tests, referral(s) within one week, if you are unable to receive the medications prescribed, or if you feel you need to change the treatment course for any reason.     TESTING:  -- none    REFERRALS:  -- none    PREVENTION (use daily regardless of headache):  -- SEEK authorization for Botox  -- continue magnesium and other medications     AS-NEEDED TREATMENT (use total no more than 10 days per month unless otherwise stated):  -- LIMIT butalbital to 8-10 days per month  -- START Ubrelvy with next migraine. You can repeat two hours later if needed. With this medication do not drink grapefruit juice or eat grapefruit or some medications like ketoconazole, itraconazole, or antibiotics clarithromycin   -- start compazine. This is a nausea medication that also has anti-migraine properties. Can take daily and will not contribute to rebound headaches

## 2023-11-16 ENCOUNTER — TELEPHONE (OUTPATIENT)
Dept: PHARMACY | Facility: CLINIC | Age: 53
End: 2023-11-16
Payer: MEDICARE

## 2023-11-16 NOTE — TELEPHONE ENCOUNTER
I have reached out to Elda Hernandez to inform her of the [facility] application process for Xifaxan and whats required to apply.  Elda Hernandez did not answer. I left a voicemail and mailed a letter introducing her to the pharmacy patient assistance program. I will follow up in 5 business days.

## 2023-11-16 NOTE — TELEPHONE ENCOUNTER
I have reached out to Elda Hernandez to inform her of the [employer] application process for Ubrelvy and Xifaxan and whats required to apply.  Elda Hernandez did not answer. I left a voicemail and mailed a letter introducing her to the pharmacy patient assistance program. I will follow up in 5 business days.

## 2023-11-16 NOTE — LETTER
November 16, 2023    Elda Timmonsfrene  [address] [facility]  [place] [address]             [facility]  [address]  [facility] [address]  Fax: [phone]   Dear Ms. Hernandez,    My Name is Lucy Tracy. I am a Pharmacy Technician reaching out on behalf of [facility]s Pharmacy Patient Assistance Team after receiving a referral from your provider inquiring about assistance with your medications. I tried to call you on 11/16/2023 but was unable to reach you. Our goal is to assist qualified patients with financial assistance for their medications to better help you achieve your health goals!    Please note that enrollment and eligibility into available support may require the following documents:    Proof of household Income( such as social security statement, 1099 form, pension statement or 3 consecutive pay stubs  Copy of all insurance cards (front and back)  Print out from your insurance or pharmacy showing how much you have spent on prescriptions this year  Signed and dated HIPAA /Patient Information Forms   (These forms will be sent to you once you contact us)     Please reach out to my phone number below if you are still in need of assistance with your medications. We will attempt to reach out to you through [employer] or via phone call again in 5 business days. We look forward to hearing from you soon!    Thank you for choosing [facility] for your healthcare needs    Sincerely  Lucy Tracy  Phone# [phone]  Fax# [phone]  Email: [email or web address]

## 2023-11-20 ENCOUNTER — TELEPHONE (OUTPATIENT)
Dept: FAMILY MEDICINE | Facility: CLINIC | Age: 53
End: 2023-11-20
Payer: MEDICARE

## 2023-11-20 NOTE — TELEPHONE ENCOUNTER
----- Message from Vishnu Manjarrez sent at 11/20/2023  2:48 PM CST -----  Type:  Patient Returning Call    Who Called:  Patient  Who Left Message for Patient:  Eugene  Does the patient know what this is regarding?:  Appointment   Best Call Back Number:   [phone]  Additional Information:

## 2023-11-21 ENCOUNTER — TELEPHONE (OUTPATIENT)
Dept: FAMILY MEDICINE | Facility: CLINIC | Age: 53
End: 2023-11-21
Payer: MEDICARE

## 2023-11-21 DIAGNOSIS — Z94.4 S/P LIVER TRANSPLANT: Primary | ICD-10-CM

## 2023-11-21 NOTE — TELEPHONE ENCOUNTER
----- Message from Aline Hill sent at 11/14/2023  1:03 PM CST -----  Regarding: Dexa Order  Good afternoon, patient is requesting order to be placed for bone density. Please contact patient to discuss. Thank you.

## 2023-11-21 NOTE — TELEPHONE ENCOUNTER
I don't have a good diagnosis to support the need for a DEXA as we usually get them at 62 y/o. Why does she want a DEXA?

## 2023-11-28 NOTE — TELEPHONE ENCOUNTER
A 2nd attempt has been made to establish contact with Elda Hernandez  via MY CHART. The final contact attempt will be made in 5 business days      Prior Authorization Retail Medication Request    Medication/Dose: Zofran 4mg  ICD code (if different than what is on RX):   Previously Tried and Failed:    Rationale:  Plan limitations exceeded    Insurance Name:  Medical Commercial   Insurance ID:  [id number]      Pharmacy Information (if different than what is on RX)  Name:    Phone:

## 2023-12-07 ENCOUNTER — TELEPHONE (OUTPATIENT)
Dept: NEUROLOGY | Facility: CLINIC | Age: 53
End: 2023-12-07
Payer: MEDICARE

## 2023-12-08 NOTE — TELEPHONE ENCOUNTER
Elda Hernandez has not returned calls or messages regarding support for her Ubrelvy and Xifaxan after multiple attempts to reach her over 10 business days. A final letter has been mailed to the patient to reach back out to Pharmacy Patient Assistance to initiate this process. Please instruct the patient to reach out to Carl Fritz   @ [phone] or [email or web address] if she is still in need of assistance.

## 2023-12-12 ENCOUNTER — PROCEDURE VISIT (OUTPATIENT)
Dept: NEUROLOGY | Facility: CLINIC | Age: 53
End: 2023-12-12
Payer: MEDICARE

## 2023-12-12 ENCOUNTER — HOSPITAL ENCOUNTER (OUTPATIENT)
Dept: RADIOLOGY | Facility: HOSPITAL | Age: 53
Discharge: HOME | End: 2023-12-12
Attending: INTERNAL MEDICINE
Payer: MEDICARE

## 2023-12-12 VITALS
RESPIRATION RATE: 17 BRPM | BODY MASS INDEX: 23.92 KG/M2 | TEMPERATURE: 98 F | HEIGHT: 59 IN | SYSTOLIC BLOOD PRESSURE: 146 MMHG | HEART RATE: 73 BPM | DIASTOLIC BLOOD PRESSURE: 67 MMHG | WEIGHT: 118.63 LBS

## 2023-12-12 DIAGNOSIS — G43.719 INTRACTABLE CHRONIC MIGRAINE WITHOUT AURA AND WITHOUT STATUS MIGRAINOSUS: Primary | ICD-10-CM

## 2023-12-12 DIAGNOSIS — Z91.89 AT HIGH RISK FOR BREAST CANCER: ICD-10-CM

## 2023-12-12 PROCEDURE — 77049 MRI BREAST C-+ W/CAD BI: CPT | Mod: 26,,, | Performed by: RADIOLOGY

## 2023-12-12 PROCEDURE — 64615 CHEMODENERV MUSC MIGRAINE: CPT | Mod: S$GLB,,, | Performed by: NURSE PRACTITIONER

## 2023-12-12 PROCEDURE — A9577 INJ MULTIHANCE: HCPCS | Mod: PO | Performed by: INTERNAL MEDICINE

## 2023-12-12 PROCEDURE — 64615 PR CHEMODENERVATION OF MUSCLE FOR CHRONIC MIGRAINE: ICD-10-PCS | Mod: S$GLB,,, | Performed by: NURSE PRACTITIONER

## 2023-12-12 PROCEDURE — 77049 MRI BREAST C-+ W/CAD BI: CPT | Mod: TC,PO

## 2023-12-12 PROCEDURE — 25500020 PHARM REV CODE 255: Mod: PO | Performed by: INTERNAL MEDICINE

## 2023-12-12 PROCEDURE — 77049 MRI BREAST W/WO CONTRAST, W/CAD, BILATERAL: ICD-10-PCS | Mod: 26,,, | Performed by: RADIOLOGY

## 2023-12-12 RX ADMIN — GADOBENATE DIMEGLUMINE 10 ML: 529 INJECTION, SOLUTION INTRAVENOUS at 01:12

## 2023-12-12 NOTE — PROCEDURES
Procedures  A time out was conducted just before the start of the procedure to verify the correct patient and procedure, procedure location, and all relevant critical information.      Conventional methods of treatment such as multiple medications, both on and   off label have been tried including: venlafaxine, ramipril, sertraline, vimpat, gabapentin, Topamax, Zyprexa, amitriptyline      The patient has been unresponsive and refractory.The patient meets criteria for chronic headaches according to the ICHD-II, the patient has more than 15 headaches a month which last for more than 4 hours a day.     Botox session number: 1  Last session was 12 weeks ago and resulted in improvement of: n/a     I am aiming for at least 50%  improvement in the patient's symptoms. Frequency of treatment is every 3 months unless no response to the treatments, at which time we will discontinue the injections.      DESCRIPTION OF PROCEDURE: After obtaining informed consent and under   aseptic technique, a total of 155 units of botulinum toxin type A were   injected in the following muscles:      -- Procerus 5 units  --  5 units bilaterally  -- Frontalis 20 units  -- Temporalis 20 units bilaterally  -- Occipitalis 15 units bilaterally  -- Upper cervical paraspinals 10 units bilaterally  -- Trapezius 15 units bilaterally.      The patient tolerated the procedure well. There were no complications. The patient was given a prescription for repeat treatment in 12 weeks      Unavoidable waste 45 units    ARIADNA Stewart

## 2023-12-15 ENCOUNTER — PATIENT MESSAGE (OUTPATIENT)
Dept: TRANSPLANT | Facility: CLINIC | Age: 53
End: 2023-12-15
Payer: MEDICARE

## 2023-12-15 ENCOUNTER — TELEPHONE (OUTPATIENT)
Dept: TRANSPLANT | Facility: CLINIC | Age: 53
End: 2023-12-15
Payer: MEDICARE

## 2023-12-15 DIAGNOSIS — C22.0 HEPATOCELLULAR CARCINOMA: Primary | ICD-10-CM

## 2023-12-15 NOTE — TELEPHONE ENCOUNTER
----- Message from Jaya Nielsen MD sent at 12/13/2023  3:17 PM CST -----  Results reviewed. No action.

## 2023-12-15 NOTE — TELEPHONE ENCOUNTER
Sent patient message via portal to let her know labs are stable.  No medication changes, next labs due on 1/27/24      ----- Message from Jaya Nielsen MD sent at 12/13/2023  3:17 PM CST -----  Results reviewed. No action.

## 2023-12-16 DIAGNOSIS — R92.1 BREAST CALCIFICATIONS: Primary | ICD-10-CM

## 2023-12-21 ENCOUNTER — HOSPITAL ENCOUNTER (OUTPATIENT)
Facility: HOSPITAL | Age: 53
Discharge: HOME | End: 2023-12-24
Attending: EMERGENCY MEDICINE | Admitting: STUDENT IN AN ORGANIZED HEALTH CARE EDUCATION/TRAINING PROGRAM
Payer: MEDICARE

## 2023-12-21 DIAGNOSIS — Z94.4 S/P LIVER TRANSPLANT: Chronic | ICD-10-CM

## 2023-12-21 DIAGNOSIS — R11.0 NAUSEA: ICD-10-CM

## 2023-12-21 DIAGNOSIS — R07.9 CHEST PAIN: ICD-10-CM

## 2023-12-21 DIAGNOSIS — G47.01 INSOMNIA DUE TO MEDICAL CONDITION: ICD-10-CM

## 2023-12-21 DIAGNOSIS — R10.9 ABDOMINAL PAIN: ICD-10-CM

## 2023-12-21 DIAGNOSIS — R11.2 NAUSEA VOMITING AND DIARRHEA: ICD-10-CM

## 2023-12-21 DIAGNOSIS — R19.7 NAUSEA VOMITING AND DIARRHEA: ICD-10-CM

## 2023-12-21 DIAGNOSIS — K22.10 ULCER OF ESOPHAGUS WITHOUT BLEEDING: ICD-10-CM

## 2023-12-21 DIAGNOSIS — R13.19 ESOPHAGEAL DYSPHAGIA: ICD-10-CM

## 2023-12-21 DIAGNOSIS — R10.84 ABDOMINAL PAIN, DIFFUSE: Primary | ICD-10-CM

## 2023-12-21 LAB
ALBUMIN SERPL BCP-MCNC: 3.3 G/DL (ref 3.5–5.2)
ALLENS TEST: NORMAL
ALP SERPL-CCNC: 631 U/L (ref 55–135)
ALT SERPL W/O P-5'-P-CCNC: 73 U/L (ref 10–44)
AMMONIA PLAS-SCNC: 51 UMOL/L (ref 10–50)
ANION GAP SERPL CALC-SCNC: 11 MMOL/L (ref 8–16)
APTT PPP: 28.7 SEC (ref 21–32)
AST SERPL-CCNC: 103 U/L (ref 10–40)
BASOPHILS # BLD AUTO: 0.04 K/UL (ref 0–0.2)
BASOPHILS NFR BLD: 0.8 % (ref 0–1.9)
BILIRUB SERPL-MCNC: 4.2 MG/DL (ref 0.1–1)
BILIRUB UR QL STRIP: NEGATIVE
BUN SERPL-MCNC: 34 MG/DL (ref 6–30)
BUN SERPL-MCNC: 40 MG/DL (ref 6–20)
CALCIUM SERPL-MCNC: 9.4 MG/DL (ref 8.7–10.5)
CHLORIDE SERPL-SCNC: 104 MMOL/L (ref 95–110)
CHLORIDE SERPL-SCNC: 105 MMOL/L (ref 95–110)
CLARITY UR REFRACT.AUTO: CLEAR
CO2 SERPL-SCNC: 19 MMOL/L (ref 23–29)
COLOR UR AUTO: YELLOW
CREAT SERPL-MCNC: 1 MG/DL (ref 0.5–1.4)
CREAT SERPL-MCNC: 1 MG/DL (ref 0.5–1.4)
DIFFERENTIAL METHOD BLD: ABNORMAL
EOSINOPHIL # BLD AUTO: 0.1 K/UL (ref 0–0.5)
EOSINOPHIL NFR BLD: 1.5 % (ref 0–8)
ERYTHROCYTE [DISTWIDTH] IN BLOOD BY AUTOMATED COUNT: 14.2 % (ref 11.5–14.5)
EST. GFR  (NO RACE VARIABLE): >60 ML/MIN/1.73 M^2
GLUCOSE SERPL-MCNC: 117 MG/DL (ref 70–110)
GLUCOSE SERPL-MCNC: 159 MG/DL (ref 70–110)
GLUCOSE UR QL STRIP: NEGATIVE
HAV IGM SERPL QL IA: NORMAL
HBV CORE IGM SERPL QL IA: NORMAL
HBV SURFACE AG SERPL QL IA: NORMAL
HCT VFR BLD AUTO: 33.1 % (ref 37–48.5)
HCT VFR BLD CALC: 25 %PCV (ref 36–54)
HCV AB SERPL QL IA: NORMAL
HGB BLD-MCNC: 11.2 G/DL (ref 12–16)
HGB UR QL STRIP: NEGATIVE
IMM GRANULOCYTES # BLD AUTO: 0.02 K/UL (ref 0–0.04)
IMM GRANULOCYTES NFR BLD AUTO: 0.4 % (ref 0–0.5)
INR PPP: 1.1 (ref 0.8–1.2)
KETONES UR QL STRIP: NEGATIVE
LDH SERPL L TO P-CCNC: 1.45 MMOL/L (ref 0.5–2.2)
LEUKOCYTE ESTERASE UR QL STRIP: NEGATIVE
LIPASE SERPL-CCNC: 54 U/L (ref 4–60)
LYMPHOCYTES # BLD AUTO: 0.5 K/UL (ref 1–4.8)
LYMPHOCYTES NFR BLD: 8.5 % (ref 18–48)
MAGNESIUM SERPL-MCNC: 2.2 MG/DL (ref 1.6–2.6)
MCH RBC QN AUTO: 30.2 PG (ref 27–31)
MCHC RBC AUTO-ENTMCNC: 33.8 G/DL (ref 32–36)
MCV RBC AUTO: 89 FL (ref 82–98)
MONOCYTES # BLD AUTO: 0.3 K/UL (ref 0.3–1)
MONOCYTES NFR BLD: 6.2 % (ref 4–15)
NEUTROPHILS # BLD AUTO: 4.4 K/UL (ref 1.8–7.7)
NEUTROPHILS NFR BLD: 82.6 % (ref 38–73)
NITRITE UR QL STRIP: NEGATIVE
NRBC BLD-RTO: 0 /100 WBC
PH UR STRIP: 6 [PH] (ref 5–8)
PHOSPHATE SERPL-MCNC: 4.3 MG/DL (ref 2.7–4.5)
PLATELET # BLD AUTO: 64 K/UL (ref 150–450)
PMV BLD AUTO: 12.3 FL (ref 9.2–12.9)
POC IONIZED CALCIUM: 1.2 MMOL/L (ref 1.06–1.42)
POC TCO2 (MEASURED): 20 MMOL/L (ref 23–29)
POTASSIUM BLD-SCNC: 4.1 MMOL/L (ref 3.5–5.1)
POTASSIUM SERPL-SCNC: 3.9 MMOL/L (ref 3.5–5.1)
PROCALCITONIN SERPL IA-MCNC: 1.7 NG/ML
PROT SERPL-MCNC: 7.4 G/DL (ref 6–8.4)
PROT UR QL STRIP: ABNORMAL
PROTHROMBIN TIME: 12.1 SEC (ref 9–12.5)
RBC # BLD AUTO: 3.71 M/UL (ref 4–5.4)
SAMPLE: ABNORMAL
SAMPLE: NORMAL
SITE: NORMAL
SODIUM BLD-SCNC: 136 MMOL/L (ref 136–145)
SODIUM SERPL-SCNC: 135 MMOL/L (ref 136–145)
SP GR UR STRIP: >1.03 (ref 1–1.03)
URN SPEC COLLECT METH UR: ABNORMAL
WBC # BLD AUTO: 5.29 K/UL (ref 3.9–12.7)

## 2023-12-21 PROCEDURE — 25000003 PHARM REV CODE 250

## 2023-12-21 PROCEDURE — 83605 ASSAY OF LACTIC ACID: CPT

## 2023-12-21 PROCEDURE — G0378 HOSPITAL OBSERVATION PER HR: HCPCS

## 2023-12-21 PROCEDURE — 85025 COMPLETE CBC W/AUTO DIFF WBC: CPT

## 2023-12-21 PROCEDURE — 87529 HSV DNA AMP PROBE: CPT | Mod: 59 | Performed by: STUDENT IN AN ORGANIZED HEALTH CARE EDUCATION/TRAINING PROGRAM

## 2023-12-21 PROCEDURE — 84100 ASSAY OF PHOSPHORUS: CPT

## 2023-12-21 PROCEDURE — 96361 HYDRATE IV INFUSION ADD-ON: CPT

## 2023-12-21 PROCEDURE — 25500020 PHARM REV CODE 255: Performed by: EMERGENCY MEDICINE

## 2023-12-21 PROCEDURE — 83735 ASSAY OF MAGNESIUM: CPT

## 2023-12-21 PROCEDURE — 87799 DETECT AGENT NOS DNA QUANT: CPT | Mod: 59 | Performed by: STUDENT IN AN ORGANIZED HEALTH CARE EDUCATION/TRAINING PROGRAM

## 2023-12-21 PROCEDURE — 87040 BLOOD CULTURE FOR BACTERIA: CPT

## 2023-12-21 PROCEDURE — 93010 ELECTROCARDIOGRAM REPORT: CPT | Mod: ,,, | Performed by: INTERNAL MEDICINE

## 2023-12-21 PROCEDURE — 87186 SC STD MICRODIL/AGAR DIL: CPT

## 2023-12-21 PROCEDURE — 80074 ACUTE HEPATITIS PANEL: CPT | Performed by: STUDENT IN AN ORGANIZED HEALTH CARE EDUCATION/TRAINING PROGRAM

## 2023-12-21 PROCEDURE — 99205 OFFICE O/P NEW HI 60 MIN: CPT | Mod: GC,,, | Performed by: INTERNAL MEDICINE

## 2023-12-21 PROCEDURE — 93005 ELECTROCARDIOGRAM TRACING: CPT

## 2023-12-21 PROCEDURE — 96376 TX/PRO/DX INJ SAME DRUG ADON: CPT

## 2023-12-21 PROCEDURE — 25000003 PHARM REV CODE 250: Performed by: STUDENT IN AN ORGANIZED HEALTH CARE EDUCATION/TRAINING PROGRAM

## 2023-12-21 PROCEDURE — 99900035 HC TECH TIME PER 15 MIN (STAT)

## 2023-12-21 PROCEDURE — 99285 EMERGENCY DEPT VISIT HI MDM: CPT | Mod: 25

## 2023-12-21 PROCEDURE — 87154 CUL TYP ID BLD PTHGN 6+ TRGT: CPT

## 2023-12-21 PROCEDURE — 80053 COMPREHEN METABOLIC PANEL: CPT

## 2023-12-21 PROCEDURE — 84145 PROCALCITONIN (PCT): CPT

## 2023-12-21 PROCEDURE — 96365 THER/PROPH/DIAG IV INF INIT: CPT | Mod: 59

## 2023-12-21 PROCEDURE — 63600175 PHARM REV CODE 636 W HCPCS

## 2023-12-21 PROCEDURE — 83690 ASSAY OF LIPASE: CPT

## 2023-12-21 PROCEDURE — 63600175 PHARM REV CODE 636 W HCPCS: Performed by: STUDENT IN AN ORGANIZED HEALTH CARE EDUCATION/TRAINING PROGRAM

## 2023-12-21 PROCEDURE — 25000003 PHARM REV CODE 250: Performed by: EMERGENCY MEDICINE

## 2023-12-21 PROCEDURE — 82140 ASSAY OF AMMONIA: CPT

## 2023-12-21 PROCEDURE — 96375 TX/PRO/DX INJ NEW DRUG ADDON: CPT

## 2023-12-21 PROCEDURE — 96367 TX/PROPH/DG ADDL SEQ IV INF: CPT

## 2023-12-21 PROCEDURE — 81003 URINALYSIS AUTO W/O SCOPE: CPT | Performed by: STUDENT IN AN ORGANIZED HEALTH CARE EDUCATION/TRAINING PROGRAM

## 2023-12-21 PROCEDURE — 36415 COLL VENOUS BLD VENIPUNCTURE: CPT | Performed by: STUDENT IN AN ORGANIZED HEALTH CARE EDUCATION/TRAINING PROGRAM

## 2023-12-21 PROCEDURE — 85730 THROMBOPLASTIN TIME PARTIAL: CPT

## 2023-12-21 PROCEDURE — 80047 BASIC METABLC PNL IONIZED CA: CPT | Mod: 91

## 2023-12-21 PROCEDURE — 85610 PROTHROMBIN TIME: CPT

## 2023-12-21 RX ORDER — LACTULOSE 10 G/15ML
10 SOLUTION ORAL 3 TIMES DAILY
Status: DISCONTINUED | OUTPATIENT
Start: 2023-12-21 | End: 2023-12-21

## 2023-12-21 RX ORDER — HYDROMORPHONE HYDROCHLORIDE 1 MG/ML
0.2 INJECTION, SOLUTION INTRAMUSCULAR; INTRAVENOUS; SUBCUTANEOUS
Status: COMPLETED | OUTPATIENT
Start: 2023-12-21 | End: 2023-12-21

## 2023-12-21 RX ORDER — MAG HYDROX/ALUMINUM HYD/SIMETH 200-200-20
30 SUSPENSION, ORAL (FINAL DOSE FORM) ORAL 4 TIMES DAILY PRN
Status: DISCONTINUED | OUTPATIENT
Start: 2023-12-21 | End: 2023-12-24 | Stop reason: HOSPADM

## 2023-12-21 RX ORDER — ONDANSETRON 2 MG/ML
4 INJECTION INTRAMUSCULAR; INTRAVENOUS EVERY 6 HOURS PRN
Status: DISCONTINUED | OUTPATIENT
Start: 2023-12-21 | End: 2023-12-22

## 2023-12-21 RX ORDER — NALOXONE HCL 0.4 MG/ML
0.02 VIAL (ML) INJECTION
Status: DISCONTINUED | OUTPATIENT
Start: 2023-12-21 | End: 2023-12-24 | Stop reason: HOSPADM

## 2023-12-21 RX ORDER — VENLAFAXINE HYDROCHLORIDE 37.5 MG/1
150 CAPSULE, EXTENDED RELEASE ORAL DAILY
Status: DISCONTINUED | OUTPATIENT
Start: 2023-12-22 | End: 2023-12-24 | Stop reason: HOSPADM

## 2023-12-21 RX ORDER — FUROSEMIDE 20 MG/1
20 TABLET ORAL DAILY
Status: DISCONTINUED | OUTPATIENT
Start: 2023-12-22 | End: 2023-12-22

## 2023-12-21 RX ORDER — TALC
6 POWDER (GRAM) TOPICAL NIGHTLY PRN
Status: DISCONTINUED | OUTPATIENT
Start: 2023-12-21 | End: 2023-12-24 | Stop reason: HOSPADM

## 2023-12-21 RX ORDER — LACTULOSE 10 G/15ML
10 SOLUTION ORAL 3 TIMES DAILY PRN
Status: DISCONTINUED | OUTPATIENT
Start: 2023-12-21 | End: 2023-12-24 | Stop reason: HOSPADM

## 2023-12-21 RX ORDER — SIMETHICONE 80 MG
1 TABLET,CHEWABLE ORAL 4 TIMES DAILY PRN
Status: DISCONTINUED | OUTPATIENT
Start: 2023-12-21 | End: 2023-12-24 | Stop reason: HOSPADM

## 2023-12-21 RX ORDER — ONDANSETRON 4 MG/1
4 TABLET, ORALLY DISINTEGRATING ORAL EVERY 6 HOURS PRN
Status: DISCONTINUED | OUTPATIENT
Start: 2023-12-21 | End: 2023-12-21

## 2023-12-21 RX ORDER — SODIUM CHLORIDE 0.9 % (FLUSH) 0.9 %
5 SYRINGE (ML) INJECTION
Status: DISCONTINUED | OUTPATIENT
Start: 2023-12-21 | End: 2023-12-24 | Stop reason: HOSPADM

## 2023-12-21 RX ORDER — SPIRONOLACTONE 25 MG/1
50 TABLET ORAL DAILY
Status: DISCONTINUED | OUTPATIENT
Start: 2023-12-22 | End: 2023-12-22

## 2023-12-21 RX ORDER — LEVOTHYROXINE SODIUM 75 UG/1
75 TABLET ORAL DAILY
Status: DISCONTINUED | OUTPATIENT
Start: 2023-12-22 | End: 2023-12-24 | Stop reason: HOSPADM

## 2023-12-21 RX ORDER — TACROLIMUS 0.5 MG/1
0.5 CAPSULE ORAL 2 TIMES DAILY
Status: DISCONTINUED | OUTPATIENT
Start: 2023-12-21 | End: 2023-12-22

## 2023-12-21 RX ORDER — LANOLIN ALCOHOL/MO/W.PET/CERES
400 CREAM (GRAM) TOPICAL 2 TIMES DAILY
Status: DISCONTINUED | OUTPATIENT
Start: 2023-12-21 | End: 2023-12-24 | Stop reason: HOSPADM

## 2023-12-21 RX ORDER — POLYETHYLENE GLYCOL 3350 17 G/17G
17 POWDER, FOR SOLUTION ORAL DAILY PRN
Status: DISCONTINUED | OUTPATIENT
Start: 2023-12-21 | End: 2023-12-24 | Stop reason: HOSPADM

## 2023-12-21 RX ORDER — ONDANSETRON 2 MG/ML
4 INJECTION INTRAMUSCULAR; INTRAVENOUS
Status: COMPLETED | OUTPATIENT
Start: 2023-12-21 | End: 2023-12-21

## 2023-12-21 RX ORDER — IPRATROPIUM BROMIDE AND ALBUTEROL SULFATE 2.5; .5 MG/3ML; MG/3ML
3 SOLUTION RESPIRATORY (INHALATION) EVERY 4 HOURS PRN
Status: DISCONTINUED | OUTPATIENT
Start: 2023-12-21 | End: 2023-12-24 | Stop reason: HOSPADM

## 2023-12-21 RX ORDER — FACIAL-BODY WIPES
10 EACH TOPICAL DAILY PRN
Status: DISCONTINUED | OUTPATIENT
Start: 2023-12-21 | End: 2023-12-24 | Stop reason: HOSPADM

## 2023-12-21 RX ORDER — CALCIUM CARBONATE 200(500)MG
1500 TABLET,CHEWABLE ORAL DAILY PRN
Status: DISCONTINUED | OUTPATIENT
Start: 2023-12-21 | End: 2023-12-24 | Stop reason: HOSPADM

## 2023-12-21 RX ORDER — HYDROMORPHONE HYDROCHLORIDE 1 MG/ML
0.5 INJECTION, SOLUTION INTRAMUSCULAR; INTRAVENOUS; SUBCUTANEOUS
Status: COMPLETED | OUTPATIENT
Start: 2023-12-21 | End: 2023-12-21

## 2023-12-21 RX ORDER — HYDROMORPHONE HYDROCHLORIDE 1 MG/ML
0.5 INJECTION, SOLUTION INTRAMUSCULAR; INTRAVENOUS; SUBCUTANEOUS EVERY 6 HOURS PRN
Status: DISCONTINUED | OUTPATIENT
Start: 2023-12-21 | End: 2023-12-22

## 2023-12-21 RX ORDER — PROMETHAZINE HYDROCHLORIDE 25 MG/1
25 TABLET ORAL EVERY 6 HOURS PRN
Status: DISCONTINUED | OUTPATIENT
Start: 2023-12-21 | End: 2023-12-22

## 2023-12-21 RX ADMIN — SODIUM CHLORIDE 500 ML: 9 INJECTION, SOLUTION INTRAVENOUS at 08:12

## 2023-12-21 RX ADMIN — ONDANSETRON 4 MG: 2 INJECTION INTRAMUSCULAR; INTRAVENOUS at 12:12

## 2023-12-21 RX ADMIN — CEFTRIAXONE 2 G: 2 INJECTION, POWDER, FOR SOLUTION INTRAMUSCULAR; INTRAVENOUS at 11:12

## 2023-12-21 RX ADMIN — HYDROMORPHONE HYDROCHLORIDE 0.5 MG: 1 INJECTION, SOLUTION INTRAMUSCULAR; INTRAVENOUS; SUBCUTANEOUS at 12:12

## 2023-12-21 RX ADMIN — HYDROMORPHONE HYDROCHLORIDE 0.2 MG: 1 INJECTION, SOLUTION INTRAMUSCULAR; INTRAVENOUS; SUBCUTANEOUS at 09:12

## 2023-12-21 RX ADMIN — PROMETHAZINE HYDROCHLORIDE 12.5 MG: 25 INJECTION INTRAMUSCULAR; INTRAVENOUS at 08:12

## 2023-12-21 RX ADMIN — TACROLIMUS 0.5 MG: 0.5 CAPSULE ORAL at 06:12

## 2023-12-21 RX ADMIN — PROMETHAZINE HYDROCHLORIDE 25 MG: 25 TABLET ORAL at 08:12

## 2023-12-21 RX ADMIN — ONDANSETRON 4 MG: 2 INJECTION INTRAMUSCULAR; INTRAVENOUS at 10:12

## 2023-12-21 RX ADMIN — IOHEXOL 75 ML: 350 INJECTION, SOLUTION INTRAVENOUS at 09:12

## 2023-12-21 RX ADMIN — HYDROMORPHONE HYDROCHLORIDE 0.5 MG: 1 INJECTION, SOLUTION INTRAMUSCULAR; INTRAVENOUS; SUBCUTANEOUS at 10:12

## 2023-12-21 RX ADMIN — HYDROMORPHONE HYDROCHLORIDE 0.5 MG: 1 INJECTION, SOLUTION INTRAMUSCULAR; INTRAVENOUS; SUBCUTANEOUS at 04:12

## 2023-12-21 RX ADMIN — ONDANSETRON 4 MG: 2 INJECTION INTRAMUSCULAR; INTRAVENOUS at 04:12

## 2023-12-21 RX ADMIN — HYDROMORPHONE HYDROCHLORIDE 0.2 MG: 1 INJECTION, SOLUTION INTRAMUSCULAR; INTRAVENOUS; SUBCUTANEOUS at 08:12

## 2023-12-21 NOTE — ED NOTES
I-STAT Chem-8+ Results:   Value Reference Range   Sodium 136 136-145 mmol/L   Potassium  4.1 3.5-5.1 mmol/L   Chloride 104  mmol/L   Ionized Calcium 1.2 1.06-1.42 mmol/L   CO2 (measured) 20 23-29 mmol/L   Glucose 159  mg/dL   BUN 34 6-30 mg/dL   Creatinine 1.0 0.5-1.4 mg/dL   Hematocrit 25 36-54%

## 2023-12-21 NOTE — CONSULTS
[facility] - Emergency Dept  Gastroenterology  Consult Note    Patient Name: Elda Hernandez  MRN: [id number]  Admission Date: 12/21/2023  Hospital Length of Stay: 0 days  Code Status: Full Code   Attending Provider: Sangita Hayes MD   Consulting Provider: Uriel Bain MD  Primary Care Physician: David Lorenzo MD  Principal Problem:<principal problem not specified>    Inpatient consult to Gastroenterology  Consult performed by: Uriel Bain MD  Consult ordered by: Sangita Hayes MD        Subjective:     HPI:  53 years old  old female patient with ongoing medical history of dysphagia with solids is being consulted to gastroenterology for significant distension of loweresophaguis with fluid and debris, and hyperdense structure at GEJ.     The patient is being seen and examined on the bedside, alert and oriented, is complaing of nausea, vomiting, diarrhea and abdominal pain since yesterday. The diarrhea is watery, explosive, and 4-5 episode daily, denied any hematemesis, and melena. The patient is on liquid diet since 03/2023, the patient and the family also mentioned she had EGD in 2022, ended up with perforated viscus, and remaied [id number] in ICU for septic shock.   Past medical history is also significant for liver transplan due to cirrhosis, SIADH, hypertension and hypothyroidism. The patient is currently taking pantoprazole 40mg daily oral at home.   EGD 09/03/2020 Savary-Arias Grade IV reflux esophagitis with strictures. Unable to traverse with adult scope. Ped scope not available. Procedure terminated, pt intolerant   Flexible sigmoidoscopy 2015 Congested mucosa in the rectum, in the sigmoid colon and in the descending colon. A single (solitary) ulcer in the rectum.     In the ED the patient had CT Abadomen and lpelvis which showed Significant distension of the lower esophagus with fluid and debris, noting a prominent hyperdense structure at the gastroesophageal junction which could result  in partial obstruction of the esophagus.   US liver showed Elevated intraparenchymal resistive indices which may be seen with rejection fluid overload, or chronic liver disease.   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    Arnold-Chiari malformation     Depression     Esophageal stricture     Essential tremor     Hypertension     Left bundle branch block     Liver fibrosis, transplanted liver 10/2/2018    Suggested on fibroscan 10/2/18    Migraine without aura     MVP (mitral valve prolapse)     Non-rheumatic mitral regurgitation 10/1/2018    Non-rheumatic tricuspid valve insufficiency 10/1/2018    Osteoporosis     Recurrent urinary tract infection     Seizures     Shingles 2007    SIADH (syndrome of inappropriate ADH production)     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    Tricuspid valve prolapse     Urolithiasis     Von Gierke disease     s/p liver transplant       Past Surgical History:   Procedure Laterality Date    APPENDECTOMY  6/22/2007    APPLICATION OF WOUND VACUUM-ASSISTED CLOSURE DEVICE N/A 9/18/2020    Procedure: APPLICATION, WOUND VAC;  Surgeon: Zain Decker MD;  Location: [facility] OR [address] [place] [address];  Service: General;  Laterality: N/A;    COLONOSCOPY  5/13/2008    internal hemorrhoids    CRANIOTOMY      ESOPHAGOGASTRODUODENOSCOPY N/A 9/3/2020    Procedure: EGD (ESOPHAGOGASTRODUODENOSCOPY);  Surgeon: Tyrel Vergara MD;  Location: [facility];  Service: Endoscopy;  Laterality: N/A;    EXPLORATORY LAPAROTOMY  2020    due to perforated stomach    LAMINECTOMY  3/2001    LIVER TRANSPLANT  9/23/2002    OSSICULAR RECONSTRUCTION  10/4/1995    RIGHT REPLACEMENT PROSTHESIS for cholesteatoma    THYMECTOMY  5/2/2007    TONSILLECTOMY, ADENOIDECTOMY  1/21/2004    TOTAL ABDOMINAL HYSTERECTOMY  3/31/1994       Review of patient's allergies indicates:   Allergen Reactions    Codeine Itching     Other reaction(s): Itching     Lipitor [atorvastatin] Other (See Comments)     Other reaction(s): Muscle pain  Muscle cranmps    Morphine Itching     Other reaction(s): nausea and vomiting     Zoloft [sertraline] Other (See Comments)     Tremors/muscle spasms     Family History       Problem Relation (Age of Onset)    Heart disease Mother    No Known Problems Father    Stroke Mother          Tobacco Use    Smoking status: Never    Smokeless tobacco: Never   Substance and Sexual Activity    Alcohol use: No    Drug use: No    Sexual activity: Not on file     Review of Systems   Constitutional:  Positive for appetite change, fatigue and unexpected weight change. Negative for activity change, chills, diaphoresis and fever.   Respiratory:  Negative for cough, choking, chest tightness and shortness of breath.    Cardiovascular:  Negative for chest pain, palpitations and leg swelling.   Gastrointestinal:  Positive for abdominal distention, abdominal pain, diarrhea, nausea and vomiting. Negative for anal bleeding, blood in stool, constipation and rectal pain.   Endocrine: Negative for cold intolerance, heat intolerance and polydipsia.   Genitourinary:  Negative for dysuria, flank pain, frequency and hematuria.   Neurological:  Positive for light-headedness. Negative for dizziness, speech difficulty, numbness and headaches.     Objective:     Vital Signs (Most Recent):  Temp: 98.4 °F (36.9 °C) (12/21/23 0649)  Pulse: 77 (12/21/23 1504)  Resp: 16 (12/21/23 1626)  BP: (!) 144/64 (12/21/23 1502)  SpO2: 96 % (12/21/23 1504) Vital Signs (24h Range):  Temp:  [98.4 °F (36.9 °C)] 98.4 °F (36.9 °C)  Pulse:  [69-85] 77  Resp:  [12-24] 16  SpO2:  [95 %-99 %] 96 %  BP: ()/(44-79) 144/64     Weight: 50.8 kg (112 lb) (12/21/23 0649)  Body mass index is 22.62 kg/m².      Intake/Output Summary (Last 24 hours) at 12/21/2023 1648  Last data filed at 12/21/2023 1147  Gross per 24 hour   Intake 649.14 ml   Output --   Net 649.14 ml       Lines/Drains/Airways        Peripheral Intravenous Line  Duration                  Peripheral IV - Single Lumen 12/21/23 0802 20 G Posterior;Proximal;Right Forearm <1 day                     Physical Exam  Constitutional:       Appearance: Normal appearance.   HENT:      Head: Normocephalic and atraumatic.   Cardiovascular:      Rate and Rhythm: Normal rate and regular rhythm.      Pulses: Normal pulses.      Heart sounds: Normal heart sounds.   Pulmonary:      Effort: Pulmonary effort is normal.      Breath sounds: Normal breath sounds.   Abdominal:      General: Abdomen is flat. There is distension.      Palpations: There is no mass.      Tenderness: There is no abdominal tenderness. There is right CVA tenderness. There is no left CVA tenderness, guarding or rebound.      Hernia: No hernia is present.   Musculoskeletal:         General: Normal range of motion.      Cervical back: Normal range of motion and neck supple.   Skin:     General: Skin is warm.   Neurological:      General: No focal deficit present.      Mental Status: She is alert and oriented to person, place, and time.      Cranial Nerves: No cranial nerve deficit.      Sensory: No sensory deficit.      Motor: No weakness.      Coordination: Coordination normal.      Gait: Gait normal.      Deep Tendon Reflexes: Reflexes normal.          Significant Labs:  CBC:   Recent Labs   Lab 12/21/23  0749 12/21/23  0834   WBC 5.29  --    HGB 11.2*  --    HCT 33.1* 25*   PLT 64*  --      CMP:   Recent Labs   Lab 12/21/23  0749   *   CALCIUM 9.4   ALBUMIN 3.3*   PROT 7.4   *   K 3.9   CO2 19*      BUN 40*   CREATININE 1.0   ALKPHOS 631*   ALT 73*   *   BILITOT 4.2*       Significant Imaging:  Imaging results within the past 24 hours have been reviewed.  Assessment/Plan:     GI  Abdominal pain  The patient is being consulted to gastroenterology for dysphagia to liquid and solids, persistent nausea and vomiting.   The patient has on going dysphagia with solids since  2020, when she had EGD.  The patient also started having diarrhea, and abdominal; pain. Denied any chest pain, or difficulty in breathing.  Past medical history significant for GERD on pantaprazole, and liver transplant followed here in hepatology.   No acute distress.    Recommendations   The patient may have sever esophageal inflammation, or esophageal stricture leading to dysphagia with solids and liquids along with persistent vomiting.   Continue to manage for vomiting and nausea with anti-emetics and fluids   Continue to monitor for serum electrolytes.   We will perform EGD tomorrow in the morning.         Thank you for your consult. I will follow-up with patient. Please contact us if you have any additional questions.    Uriel Bain MD  Gastroenterology  [facility] - Emergency Dept

## 2023-12-21 NOTE — ED NOTES
Telemetry Verification   Patient placed on Telemetry Box  Verified with War Room  Box # [id number]   Monitor Tech Jaya   Rate 78   Rhythm NS

## 2023-12-21 NOTE — ED PROVIDER NOTES
Encounter Date: 12/21/2023       History     Chief Complaint   Patient presents with    Abdominal Pain     Pt complaining of abdominal pain and nausea since yesterday with diarrhea. Pt states end stage liver failure due to cirrhosis      The history is provided by the patient.     Elda Hernandez is a 53 y.o. 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 Pt reports N/V with occasional yellow or greenish emesis. She reports being on liquid diet since discharge from her last hospital stay in 03/2023. Pt denies any sick contacts. She reports chills but no fevers and is on rejection medications for her transplant. She attempted Zofran at home without change to her N/V symptoms. Pt reports a fainting episode earlier this morning and isn't sure if she hit her head but reports headache. She denies neck, back , or other MSK pain.  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    Arnold-Chiari malformation     Depression     Esophageal stricture     Essential tremor     Hypertension     Left bundle branch block     Liver fibrosis, transplanted liver 10/2/2018    Suggested on fibroscan 10/2/18    Migraine without aura     MVP (mitral valve prolapse)     Non-rheumatic mitral regurgitation 10/1/2018    Non-rheumatic tricuspid valve insufficiency 10/1/2018    Osteoporosis     Recurrent urinary tract infection     Seizures     Shingles 2007    SIADH (syndrome of inappropriate ADH production)     Squamous cell carcinoma  10/2014    vaginal    Tricuspid valve prolapse     Urolithiasis     Von Gierke disease     s/p liver transplant     Past Surgical History:   Procedure Laterality Date    APPENDECTOMY  6/22/2007    APPLICATION OF WOUND VACUUM-ASSISTED CLOSURE DEVICE N/A 9/18/2020    Procedure: APPLICATION, WOUND VAC;  Surgeon: Zain Decker MD;  Location: [facility] OR [address] [place] [address];  Service: General;  Laterality: N/A;    COLONOSCOPY  5/13/2008    internal hemorrhoids    CRANIOTOMY      ESOPHAGOGASTRODUODENOSCOPY N/A 9/3/2020    Procedure: EGD (ESOPHAGOGASTRODUODENOSCOPY);  Surgeon: Tyrel Vergara MD;  [facility];  Service: Endoscopy;  Laterality: N/A;    EXPLORATORY LAPAROTOMY  2020    due to perforated stomach    LAMINECTOMY  3/2001    LIVER TRANSPLANT  9/23/2002    OSSICULAR RECONSTRUCTION  10/4/1995    RIGHT REPLACEMENT PROSTHESIS for cholesteatoma    THYMECTOMY  5/2/2007    TONSILLECTOMY, ADENOIDECTOMY  1/21/2004    TOTAL ABDOMINAL HYSTERECTOMY  3/31/1994     Family History   Problem Relation Age of Onset    Stroke Mother     Heart disease Mother     No Known Problems Father      Social History     Tobacco Use    Smoking status: Never    Smokeless tobacco: Never   Substance Use Topics    Alcohol use: No    Drug use: No     Review of Systems    Physical Exam     Initial Vitals [12/21/23 0649]   BP Pulse Resp Temp SpO2   (!) 181/72 85 18 98.4 °F (36.9 °C) 99 %      MAP       --         Physical Exam    Vitals reviewed.  Constitutional: She is not diaphoretic. She appears cachectic. She is active and cooperative. She has a sickly appearance. She appears distressed.   Pt is tearful but able to answer questions appropriately   HENT:   Tenderness to palpation of the R head.    Eyes: EOM are normal.   Neck:   Normal range of motion.  Cardiovascular:  Normal rate and regular rhythm.     Exam reveals no gallop and no friction rub.       Murmur heard.  Murmur with know history of MR   Pulmonary/Chest: Breath sounds normal. No  respiratory distress. She has no wheezes. She has no rhonchi. She has no rales.   Abdominal: Abdomen is soft and protuberant. She exhibits distension. A surgical scar is present. There is hepatomegaly. There is generalized abdominal tenderness.   Midline surgical scar is well-healed. Generalized tenderness to palpation with hepatomegaly, no obvious fluid wave or guarding.  There is no guarding.   Musculoskeletal:         General: No edema.      Cervical back: Normal range of motion.     Neurological: She is alert and oriented to person, place, and time. GCS score is 15. GCS eye subscore is 4. GCS verbal subscore is 5. GCS motor subscore is 6.   Skin: Skin is warm and dry.   Psychiatric: She has a normal mood and affect. Thought content normal.         ED Course   Procedures  Labs Reviewed   CBC W/ AUTO DIFFERENTIAL - Abnormal; Notable for the following components:       Result Value    RBC 3.71 (*)     Hemoglobin 11.2 (*)     Hematocrit 33.1 (*)     Platelets 64 (*)     Lymph # 0.5 (*)     Gran % 82.6 (*)     Lymph % 8.5 (*)     All other components within normal limits   COMPREHENSIVE METABOLIC PANEL - Abnormal; Notable for the following components:    Sodium 135 (*)     CO2 19 (*)     Glucose 117 (*)     BUN 40 (*)     Albumin 3.3 (*)     Total Bilirubin 4.2 (*)     Alkaline Phosphatase 631 (*)      (*)     ALT 73 (*)     All other components within normal limits   PROCALCITONIN - Abnormal; Notable for the following components:    Procalcitonin 1.70 (*)     All other components within normal limits   AMMONIA - Abnormal; Notable for the following components:    Ammonia 51 (*)     All other components within normal limits   ISTAT PROCEDURE - Abnormal; Notable for the following components:    POC Glucose 159 (*)     POC BUN 34 (*)     POC TCO2 (MEASURED) 20 (*)     POC Hematocrit 25 (*)     All other components within normal limits   CULTURE, BLOOD   CULTURE, BLOOD   CLOSTRIDIUM DIFFICILE   MAGNESIUM    PHOSPHORUS   LIPASE   PROTIME-INR   APTT   URINALYSIS, REFLEX TO URINE CULTURE   ISTAT LACTATE   ISTAT CHEM8     EKG Readings: (Independently Interpreted)   Sinus rhythm, LBBB, does not meet modified Sgarbossa's criteria.  No STEMI.     ECG Results              EKG 12-lead (Final result)  Result time 12/21/23 09:39:04      Final result by Interface, Lab In [facility] (12/21/23 09:39:04)                   Narrative:    Test Reason : R10.9,    Vent. Rate : 076 BPM     Atrial Rate : 076 BPM     P-R Int : 136 ms          QRS Dur : 144 ms      QT Int : 430 ms       P-R-T Axes : 072 003 089 degrees     QTc Int : 483 ms    Normal sinus rhythm with sinus arrhythmia  Left bundle branch block  Abnormal ECG  When compared with ECG of 28-FEB-2023 17:52,  T wave inversion less evident in Lateral leads  Confirmed by John PORTILLO MD ([id number]) on 12/21/2023 9:38:57 AM    Referred By: [email or web address]   SELF           Confirmed By:John PORTILLO MD                                  Imaging Results              US Doppler Liver Transplant Post (xpd) (Final result)  Result time 12/21/23 11:11:20      Final result by Gallo Flynn MD (12/21/23 11:11:20)                   Impression:      Elevated intraparenchymal resistive indices which may be seen with rejection fluid overload, or chronic liver disease.      Electronically signed by: Gallo Flynn MD  Date:    12/21/2023  Time:    11:11               Narrative:    EXAMINATION:  US DOPPLER LIVER TRANSPLANT POST (XPD)    CLINICAL HISTORY:  concern for rejection, history of transplant;    TECHNIQUE:  Ultrasound of the transplant liver with Doppler evaluation.  Color and spectral Doppler were performed.    COMPARISON:  Ultrasound from 08/14/2023    FINDINGS:  Patient is status post orthotopic liver transplant.  Liver allograft is normal in size.  The liver demonstrates homogeneous echotexture.  No focal hepatic lesions are seen.  No fluid collections.    The common duct is not dilated, measuring  4 mm.  No dilated intrahepatic radicles are seen.    Color flow and spectral waveform analysis was performed.  The main portal vein, right portal vein, left portal vein, middle hepatic vein, right hepatic vein, left hepatic vein, and IVC are patent with proper directional flow.    Anastomosis site of the main hepatic artery demonstrates a peak systolic velocity 166 cm/sec.    Main hepatic artery demonstrates resistive index 0.85 with normal waveform.    Left hepatic artery shows resistive index 0.84 with normal waveform.    Anterior branch of the right hepatic artery demonstrates resistive index 0.82 with normal waveform.    Posterior branch of the right hepatic artery demonstrates resistive index 0.89 with normal waveform.    Right pleural effusion.                                       CT Abdomen Pelvis With IV Contrast NO Oral Contrast (Final result)  Result time 12/21/23 10:32:58      Final result by Clark Morales MD (12/21/23 10:32:58)                   Impression:      Significant distension of the lower esophagus with fluid and debris, noting a prominent hyperdense structure at the gastroesophageal junction which could result in partial obstruction of the esophagus.  Suggest correlation with symptoms in this patient with provided history of acute nonlocalized abdominal pain.    Cirrhotic liver morphology with stigmata of portal hypertension including splenomegaly, collateral vessels, and trace ascites.    Wall thickening and edematous appearance of the ascending colon which could be related to a nonspecific colitis or portal colopathy.    Nonspecific wall thickening of the stomach, potentially related to gastritis or portal gastropathy.    Mild anasarca.    New small right pleural effusion.    Additional findings discussed in the body of the report.      Electronically signed by: Clark Morales MD  Date:    12/21/2023  Time:    10:32               Narrative:    EXAMINATION:  CT ABDOMEN PELVIS WITH IV  CONTRAST    CLINICAL HISTORY:  Abdominal pain, acute, nonlocalized;    TECHNIQUE:  Low dose axial images, sagittal and coronal reformations were obtained from the lung bases to the pubic symphysis following the IV administration of 75 mL of Omnipaque 350 .  Oral contrast was not given.    COMPARISON:  CT abdomen pelvis without contrast, 03/06/2023.    FINDINGS:  Lower Chest:    Bibasilar subsegmental atelectasis.  Small right pleural effusion.  Lower esophagus is distended with fluid and debris.  There is a calcification at the gastroesophageal junction which may represent ingested material which may partially obstruct the lower esophagus.  Heart size is stable.  No pericardial effusion.    Abdomen:    Evaluation is limited by extensive streak artifact due to the patient's arms overlying the field of view.  Exam quality also limited by motion.    Cirrhotic liver morphology with subtle heterogeneity of the liver parenchyma.  No worrisome hypoattenuating mass identified on this single phase study.  Gallbladder appears surgically absent.  No intrahepatic biliary ductal dilatation.    Spleen is enlarged and similar to the prior study.  Adrenal glands and pancreas are unremarkable.    The kidneys are symmetric.  No hydronephrosis. Bilateral renal calcifications as seen previously.  Possible small peripherally calcified left renal artery aneurysm.    Significant wall thickening of the stomach with mucosal irregularity and enhancement.  No small bowel obstruction.  Moderate stool burden in the colon.  Significant mucosal edema and decreased wall enhancement involving the cecum and ascending colon, similar to prior.  Appendix is not identified and may be surgically absent.    Trace abdominopelvic free fluid.  No organized fluid collection.  No pneumoperitoneum.  No portal venous gas.    No bulky retroperitoneal lymphadenopathy.    Abdominal aorta is normal in caliber with moderate calcific atherosclerosis.    Portal,  splenic, and superior mesenteric veins are patent.  Portal venous system is distended with multiple collateral vessels most notable in the left upper quadrant of the abdomen and in the retroperitoneum.  Questionable small gastroesophageal varices.  Umbilical vein is not distended.    Pelvis:    Urinary bladder is negative for acute finding.  Uterus appears surgically absent.  Rectum is unremarkable.  Trace pelvic free fluid.    Bones and soft tissues:    No aggressive osseous lesions.  Stable mild degenerative changes in the spine.  Mild body wall edema.  Small abdominal hernia containing fat and fluid.                                       CT Head Without Contrast (Final result)  Result time 12/21/23 09:23:17      Final result by Andrea Caldera DO (12/21/23 09:23:17)                   Impression:      No acute intracranial findings as detailed above specifically without evidence for acute intracranial hemorrhage or sulcal effacement to suggest large territory recent infarction.  Further evaluation as warranted clinically.      Electronically signed by: Andrea Caldera DO  Date:    12/21/2023  Time:    09:23               Narrative:    EXAMINATION:  CT HEAD WITHOUT CONTRAST    CLINICAL HISTORY:  Head trauma, repeat vomiting (Age 19-64y);    TECHNIQUE:  Multiple sequential 5 mm axial images of the head without contrast.  Coronal and sagittal reformatted imaging from the axial acquisition.    COMPARISON:  MRI brain 03/01/2023 and CT head 02/28/2023    FINDINGS:  There is no evidence for acute intracranial hemorrhage or sulcal effacement.  Stable parenchymal calcifications cerebral subcortical white matter and basal ganglia.  The ventricles are normal in size without hydrocephalus.  There is no midline shift or mass effect.  Remote operative change right mastoidectomy.  Visualized paranasal sinuses and mastoid air cells are clear.                                       X-Ray Chest AP Portable (Final result)  Result  time 12/21/23 08:52:27      Final result by David Finney MD (12/21/23 08:52:27)                   Impression:      No significant intrathoracic abnormality.  No significant detrimental interval change in the appearance of the chest since the most recent prior examination of 02/28/2023.      Electronically signed by: David Finney MD  Date:    12/21/2023  Time:    08:52               Narrative:    EXAMINATION:  XR CHEST AP PORTABLE    CLINICAL HISTORY:  Sepsis;    TECHNIQUE:  One view    COMPARISON:  Comparison is made to 02/28/2023 and 03/24/2022.  Clinical information of abdominal pain and nausea is obtained from the electronic medical record.    FINDINGS:  Postoperative changes are again noted in the thorax.  Heart size and the appearance of the cardiomediastinal silhouette demonstrate no detrimental interval change since the examinations referenced above.  Pulmonary vascularity is normal.  Lung zones are clear, and are free of significant airspace consolidation or volume loss.  No pleural fluid.  No hilar or mediastinal mass lesion.  No pneumothorax.                                       Medications   promethazine (PHENERGAN) 12.5 mg in dextrose 5 % (D5W) 50 mL IVPB (0 mg Intravenous Stopped 12/21/23 0828)   HYDROmorphone injection 0.2 mg (0.2 mg Intravenous Given 12/21/23 0802)   sodium chloride 0.9% bolus 500 mL 500 mL (0 mLs Intravenous Stopped 12/21/23 1117)   cefTRIAXone (ROCEPHIN) 2 g in dextrose 5 % in water (D5W) 100 mL IVPB (MB+) (0 g Intravenous Stopped 12/21/23 1147)   iohexoL (OMNIPAQUE 350) injection 75 mL (75 mLs Intravenous Given 12/21/23 0902)   HYDROmorphone injection 0.2 mg (0.2 mg Intravenous Given 12/21/23 0918)   HYDROmorphone injection 0.5 mg (0.5 mg Intravenous Given 12/21/23 1200)   ondansetron injection 4 mg (4 mg Intravenous Given 12/21/23 1200)     Medical Decision Making  Elda Andreea CHAVEZ Mary  is a 53 y.o. female, presenting with complaints of abdominal pain, N/V, history of present illness  obtained from patient as seen above. At time of initial exam patient resting in bed, tearful and complaining of abdominal pain and nausea, able to provide adequate history of present illness and tolerated physical exam well. Patient found to be mild distress, tearful and complaining of nausea, abdominal pain and headache, stable. Exam notable as above.     DDX includes but is not limited to: sepsis, SBP, acute liver failure/ liver transplant rejection, intracranial bleed.   Pt had syncopal episode without unknown head trauma, will obtain CT Head to assess for intracranial bleed. Pt reporting N/V, abdominal pain, and chills concerning for sepsis without a source. Bedside US did not reveal ascitic pool adequate for tap, will empirically treat with rocephin 2g. No evidence of acute intracranial pathology including intracranial bleed on CT Head.     Elevated ammonia 51, improved from previous baseline. New elevated protocal 1.7. New elevated bili 4.2 and new mild elevations in , , ALT 73. Liver US showed elevated intraparenchymal resistive indices which may be seen with rejection, fluid overload, or chronic liver disease. CT abdomen and pelvis showed new small right pleural effusion. Bedside ultrasound did not reveal a large enough ascitic collection for tap. Patient has required multiple agents for nausea and vomiting, but continues to have poor control. She sas required x3 doses Dilaudid with ongoing severe pain. Consult place with Liver transplant, paged twice without answer. Discussed this case with Hospital Medicine who agree to evaluate this patient and admit to their service.     This patient does not have evidence of infective focus  My overall impression is sepsis with concern for SBP.  Source: Unknown but suspect SBP  Antibiotics given- Antibiotics (72h ago, onward)    None      Latest lactate reviewed-  Lab             12/21/23                       0834          POCLAC       1.45          Organ  dysfunction indicated by Acute liver injury    Fluid challenge Other- Patient to receive 500mL volume other than 30cc/kg due to End Stage Liver Disease     Post- resuscitation assessment Yes Perfusion exam was performed within 6 hours of septic shock presentation after bolus shows Adequate tissue perfusion assessed by non-invasive monitoring       Will Not start Pressors- Levophed for MAP of 65  Source control achieved by: Rocephin 2g       Data Reviewed/Counseling: I have reviewed the patient's vital signs, nursing notes, and other relevant tests/information. Any incidental findings were discussed with the patient. I had a detailed discussion with the patient regarding the historical points, exam findings, and any diagnostic results supporting the diagnosis. Discussed case with HM, who agreed to admit patient.  Disposition: Admitted     Amount and/or Complexity of Data Reviewed  Labs: ordered. Decision-making details documented in ED Course.  Radiology: ordered and independent interpretation performed.     Details: CT head: no intracranial bleed or mass noted    Risk  Prescription drug management.              Attending Attestation:   Physician Attestation Statement for Resident:  As the supervising MD   Physician Attestation Statement: I have personally seen and examined this patient.   I agree with the above history.  -:   As the supervising MD I agree with the above PE.     As the supervising MD I agree with the above treatment, course, plan, and disposition.    I have reviewed and agree with the residents interpretation of the following: lab data, EKG and CT scans.                 ED Course as of 12/21/23 1415   Thu Dec 21, 2023   0819 Hemoglobin(!): 11.2  Improved from previous [HB]   0819 Hematocrit(!): 33.1  Improved from previous [HB]   0819 Protime: 12.1  wnl [HB]   0819 INR: 1.1  wnl [HB]   0819 aPTT: 28.7  wnl [HB]   0833 Ammonia(!): 51  Improved from previous [HB]   0833 Platelet Count(!): 64  Mildly  decreased [HB]   0858 BILIRUBIN TOTAL(!): 4.2  Acute elevation  [HB]   0858 ALP(!): 631  Acute elevation  [HB]   0858 AST(!): 103  Acute elevation  [HB]   0858 ALT(!): 73  Acute elevation  [HB]   0904 Lipase: 54  wnl [HB]   0904 Magnesium : 2.2  wnl [HB]   0904 Phosphorus Level: 4.3  wnl [HB]   0904 POC Lactate: 1.45  wnl [HB]   1003 Procalcitonin(!): 1.70 [HB]      ED Course User Index  [HB] Pina Sauer MD                 Medical Decision Making:   Clinical Tests:   Sepsis Perfusion Assessment: "I attest a sepsis perfusion exam was performed within 6 hours of sepsis, severe sepsis, or septic shock presentation, following fluid resuscitation."             Clinical Impression:  Final diagnoses:  [R10.84] Abdominal pain, diffuse (Primary)  [R10.9] Abdominal pain  [R11.2, R19.7] Nausea vomiting and diarrhea          ED Disposition Condition    Observation                 Pina Sauer MD  Resident  12/21/23 1415       Karson Power MD  12/22/23 [id number]

## 2023-12-21 NOTE — H&P (VIEW-ONLY)
[facility] - Emergency Dept  Gastroenterology  Consult Note    Patient Name: Elda Hernandez  MRN: [id number]  Admission Date: 12/21/2023  Hospital Length of Stay: 0 days  Code Status: Full Code   Attending Provider: Sangita Hayes MD   Consulting Provider: Uriel Bain MD  Primary Care Physician: David Lorenzo MD  Principal Problem:<principal problem not specified>    Inpatient consult to Gastroenterology  Consult performed by: Uriel Bain MD  Consult ordered by: Sangita Hayes MD        Subjective:     HPI:  53 years old  old female patient with ongoing medical history of dysphagia with solids is being consulted to gastroenterology for significant distension of loweresophaguis with fluid and debris, and hyperdense structure at GEJ.     The patient is being seen and examined on the bedside, alert and oriented, is complaing of nausea, vomiting, diarrhea and abdominal pain since yesterday. The diarrhea is watery, explosive, and 4-5 episode daily, denied any hematemesis, and melena. The patient is on liquid diet since 03/2023, the patient and the family also mentioned she had EGD in 2022, ended up with perforated viscus, and remaied [id number] in ICU for septic shock.   Past medical history is also significant for liver transplan due to cirrhosis, SIADH, hypertension and hypothyroidism. The patient is currently taking pantoprazole 40mg daily oral at home.   EGD 09/03/2020 Savary-Arias Grade IV reflux esophagitis with strictures. Unable to traverse with adult scope. Ped scope not available. Procedure terminated, pt intolerant   Flexible sigmoidoscopy 2015 Congested mucosa in the rectum, in the sigmoid colon and in the descending colon. A single (solitary) ulcer in the rectum.     In the ED the patient had CT Abadomen and lpelvis which showed Significant distension of the lower esophagus with fluid and debris, noting a prominent hyperdense structure at the gastroesophageal junction which could result  in partial obstruction of the esophagus.   US liver showed Elevated intraparenchymal resistive indices which may be seen with rejection fluid overload, or chronic liver disease.   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    Arnold-Chiari malformation     Depression     Esophageal stricture     Essential tremor     Hypertension     Left bundle branch block     Liver fibrosis, transplanted liver 10/2/2018    Suggested on fibroscan 10/2/18    Migraine without aura     MVP (mitral valve prolapse)     Non-rheumatic mitral regurgitation 10/1/2018    Non-rheumatic tricuspid valve insufficiency 10/1/2018    Osteoporosis     Recurrent urinary tract infection     Seizures     Shingles 2007    SIADH (syndrome of inappropriate ADH production)     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    Tricuspid valve prolapse     Urolithiasis     Von Gierke disease     s/p liver transplant       Past Surgical History:   Procedure Laterality Date    APPENDECTOMY  6/22/2007    APPLICATION OF WOUND VACUUM-ASSISTED CLOSURE DEVICE N/A 9/18/2020    Procedure: APPLICATION, WOUND VAC;  Surgeon: Zain Decker MD;  Location: [facility] OR [address] [place] [address];  Service: General;  Laterality: N/A;    COLONOSCOPY  5/13/2008    internal hemorrhoids    CRANIOTOMY      ESOPHAGOGASTRODUODENOSCOPY N/A 9/3/2020    Procedure: EGD (ESOPHAGOGASTRODUODENOSCOPY);  Surgeon: Tyrel Vergara MD;  Location: [facility];  Service: Endoscopy;  Laterality: N/A;    EXPLORATORY LAPAROTOMY  2020    due to perforated stomach    LAMINECTOMY  3/2001    LIVER TRANSPLANT  9/23/2002    OSSICULAR RECONSTRUCTION  10/4/1995    RIGHT REPLACEMENT PROSTHESIS for cholesteatoma    THYMECTOMY  5/2/2007    TONSILLECTOMY, ADENOIDECTOMY  1/21/2004    TOTAL ABDOMINAL HYSTERECTOMY  3/31/1994       Review of patient's allergies indicates:   Allergen Reactions    Codeine Itching     Other reaction(s): Itching     Lipitor [atorvastatin] Other (See Comments)     Other reaction(s): Muscle pain  Muscle cranmps    Morphine Itching     Other reaction(s): nausea and vomiting     Zoloft [sertraline] Other (See Comments)     Tremors/muscle spasms     Family History       Problem Relation (Age of Onset)    Heart disease Mother    No Known Problems Father    Stroke Mother          Tobacco Use    Smoking status: Never    Smokeless tobacco: Never   Substance and Sexual Activity    Alcohol use: No    Drug use: No    Sexual activity: Not on file     Review of Systems   Constitutional:  Positive for appetite change, fatigue and unexpected weight change. Negative for activity change, chills, diaphoresis and fever.   Respiratory:  Negative for cough, choking, chest tightness and shortness of breath.    Cardiovascular:  Negative for chest pain, palpitations and leg swelling.   Gastrointestinal:  Positive for abdominal distention, abdominal pain, diarrhea, nausea and vomiting. Negative for anal bleeding, blood in stool, constipation and rectal pain.   Endocrine: Negative for cold intolerance, heat intolerance and polydipsia.   Genitourinary:  Negative for dysuria, flank pain, frequency and hematuria.   Neurological:  Positive for light-headedness. Negative for dizziness, speech difficulty, numbness and headaches.     Objective:     Vital Signs (Most Recent):  Temp: 98.4 °F (36.9 °C) (12/21/23 0649)  Pulse: 77 (12/21/23 1504)  Resp: 16 (12/21/23 1626)  BP: (!) 144/64 (12/21/23 1502)  SpO2: 96 % (12/21/23 1504) Vital Signs (24h Range):  Temp:  [98.4 °F (36.9 °C)] 98.4 °F (36.9 °C)  Pulse:  [69-85] 77  Resp:  [12-24] 16  SpO2:  [95 %-99 %] 96 %  BP: ()/(44-79) 144/64     Weight: 50.8 kg (112 lb) (12/21/23 0649)  Body mass index is 22.62 kg/m².      Intake/Output Summary (Last 24 hours) at 12/21/2023 1648  Last data filed at 12/21/2023 1147  Gross per 24 hour   Intake 649.14 ml   Output --   Net 649.14 ml       Lines/Drains/Airways        Peripheral Intravenous Line  Duration                  Peripheral IV - Single Lumen 12/21/23 0802 20 G Posterior;Proximal;Right Forearm <1 day                     Physical Exam  Constitutional:       Appearance: Normal appearance.   HENT:      Head: Normocephalic and atraumatic.   Cardiovascular:      Rate and Rhythm: Normal rate and regular rhythm.      Pulses: Normal pulses.      Heart sounds: Normal heart sounds.   Pulmonary:      Effort: Pulmonary effort is normal.      Breath sounds: Normal breath sounds.   Abdominal:      General: Abdomen is flat. There is distension.      Palpations: There is no mass.      Tenderness: There is no abdominal tenderness. There is right CVA tenderness. There is no left CVA tenderness, guarding or rebound.      Hernia: No hernia is present.   Musculoskeletal:         General: Normal range of motion.      Cervical back: Normal range of motion and neck supple.   Skin:     General: Skin is warm.   Neurological:      General: No focal deficit present.      Mental Status: She is alert and oriented to person, place, and time.      Cranial Nerves: No cranial nerve deficit.      Sensory: No sensory deficit.      Motor: No weakness.      Coordination: Coordination normal.      Gait: Gait normal.      Deep Tendon Reflexes: Reflexes normal.          Significant Labs:  CBC:   Recent Labs   Lab 12/21/23  0749 12/21/23  0834   WBC 5.29  --    HGB 11.2*  --    HCT 33.1* 25*   PLT 64*  --      CMP:   Recent Labs   Lab 12/21/23  0749   *   CALCIUM 9.4   ALBUMIN 3.3*   PROT 7.4   *   K 3.9   CO2 19*      BUN 40*   CREATININE 1.0   ALKPHOS 631*   ALT 73*   *   BILITOT 4.2*       Significant Imaging:  Imaging results within the past 24 hours have been reviewed.  Assessment/Plan:     GI  Abdominal pain  The patient is being consulted to gastroenterology for dysphagia to liquid and solids, persistent nausea and vomiting.   The patient has on going dysphagia with solids since  2020, when she had EGD.  The patient also started having diarrhea, and abdominal; pain. Denied any chest pain, or difficulty in breathing.  Past medical history significant for GERD on pantaprazole, and liver transplant followed here in hepatology.   No acute distress.    Recommendations   The patient may have sever esophageal inflammation, or esophageal stricture leading to dysphagia with solids and liquids along with persistent vomiting.   Continue to manage for vomiting and nausea with anti-emetics and fluids   Continue to monitor for serum electrolytes.   We will perform EGD tomorrow in the morning.         Thank you for your consult. I will follow-up with patient. Please contact us if you have any additional questions.    Uriel Bain MD  Gastroenterology  [facility] - Emergency Dept

## 2023-12-21 NOTE — HPI
53 years old  old female patient with ongoing medical history of dysphagia with solids is being consulted to gastroenterology for significant distension of loweresophaguis with fluid and debris, and hyperdense structure at GEJ.     The patient is being seen and examined on the bedside, alert and oriented, is complaing of nausea, vomiting, diarrhea and abdominal pain since yesterday. The diarrhea is watery, explosive, and 4-5 episode daily, denied any hematemesis, and melena. The patient is on liquid diet since 03/2023, the patient and the family also mentioned she had EGD in 2022, ended up with perforated viscus, and remaied [id number] in ICU for septic shock.   Past medical history is also significant for liver transplan due to cirrhosis, SIADH, hypertension and hypothyroidism. The patient is currently taking pantoprazole 40mg daily oral at home.   EGD 09/03/2020 Marii Grade IV reflux esophagitis with strictures. Unable to traverse with adult scope. Ped scope not available. Procedure terminated, pt intolerant   Flexible sigmoidoscopy 2015 Congested mucosa in the rectum, in the sigmoid colon and in the descending colon. A single (solitary) ulcer in the rectum.     In the ED the patient had CT Abadomen and lpelvis which showed Significant distension of the lower esophagus with fluid and debris, noting a prominent hyperdense structure at the gastroesophageal junction which could result in partial obstruction of the esophagus.   US liver showed Elevated intraparenchymal resistive indices which may be seen with rejection fluid overload, or chronic liver disease.   Blood work showed amonia level 51, BUN/creat 40/1.0

## 2023-12-21 NOTE — SUBJECTIVE & OBJECTIVE
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    Arnold-Chiari malformation     Depression     Esophageal stricture     Essential tremor     Hypertension     Left bundle branch block     Liver fibrosis, transplanted liver 10/2/2018    Suggested on fibroscan 10/2/18    Migraine without aura     MVP (mitral valve prolapse)     Non-rheumatic mitral regurgitation 10/1/2018    Non-rheumatic tricuspid valve insufficiency 10/1/2018    Osteoporosis     Recurrent urinary tract infection     Seizures     Shingles 2007    SIADH (syndrome of inappropriate ADH production)     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    Tricuspid valve prolapse     Urolithiasis     Von Gierke disease     s/p liver transplant       Past Surgical History:   Procedure Laterality Date    APPENDECTOMY  6/22/2007    APPLICATION OF WOUND VACUUM-ASSISTED CLOSURE DEVICE N/A 9/18/2020    Procedure: APPLICATION, WOUND VAC;  Surgeon: Zain Decker MD;  Location: [address];  Service: General;  Laterality: N/A;    COLONOSCOPY  5/13/2008    internal hemorrhoids    CRANIOTOMY      ESOPHAGOGASTRODUODENOSCOPY N/A 9/3/2020    Procedure: EGD (ESOPHAGOGASTRODUODENOSCOPY);  Surgeon: Tyrel Vergara MD;  Location: [facility];  Service: Endoscopy;  Laterality: N/A;    EXPLORATORY LAPAROTOMY  2020    due to perforated stomach    LAMINECTOMY  3/2001    LIVER TRANSPLANT  9/23/2002    OSSICULAR RECONSTRUCTION  10/4/1995    RIGHT REPLACEMENT PROSTHESIS for cholesteatoma    THYMECTOMY  5/2/2007    TONSILLECTOMY, ADENOIDECTOMY  1/21/2004    TOTAL ABDOMINAL HYSTERECTOMY  3/31/1994       Review of patient's allergies indicates:   Allergen Reactions    Codeine Itching     Other reaction(s): Itching    Lipitor [atorvastatin] Other (See Comments)     Other reaction(s): Muscle pain  Muscle cranmps    Morphine Itching     Other reaction(s): nausea and vomiting     Zoloft [sertraline] Other (See Comments)     Tremors/muscle spasms     Family  History       Problem Relation (Age of Onset)    Heart disease Mother    No Known Problems Father    Stroke Mother          Tobacco Use    Smoking status: Never    Smokeless tobacco: Never   Substance and Sexual Activity    Alcohol use: No    Drug use: No    Sexual activity: Not on file     Review of Systems   Constitutional:  Positive for appetite change, fatigue and unexpected weight change. Negative for activity change, chills, diaphoresis and fever.   Respiratory:  Negative for cough, choking, chest tightness and shortness of breath.    Cardiovascular:  Negative for chest pain, palpitations and leg swelling.   Gastrointestinal:  Positive for abdominal distention, abdominal pain, diarrhea, nausea and vomiting. Negative for anal bleeding, blood in stool, constipation and rectal pain.   Endocrine: Negative for cold intolerance, heat intolerance and polydipsia.   Genitourinary:  Negative for dysuria, flank pain, frequency and hematuria.   Neurological:  Positive for light-headedness. Negative for dizziness, speech difficulty, numbness and headaches.     Objective:     Vital Signs (Most Recent):  Temp: 98.4 °F (36.9 °C) (12/21/23 0649)  Pulse: 77 (12/21/23 1504)  Resp: 16 (12/21/23 1626)  BP: (!) 144/64 (12/21/23 1502)  SpO2: 96 % (12/21/23 1504) Vital Signs (24h Range):  Temp:  [98.4 °F (36.9 °C)] 98.4 °F (36.9 °C)  Pulse:  [69-85] 77  Resp:  [12-24] 16  SpO2:  [95 %-99 %] 96 %  BP: ()/(44-79) 144/64     Weight: 50.8 kg (112 lb) (12/21/23 0649)  Body mass index is 22.62 kg/m².      Intake/Output Summary (Last 24 hours) at 12/21/2023 1648  Last data filed at 12/21/2023 1147  Gross per 24 hour   Intake 649.14 ml   Output --   Net 649.14 ml       Lines/Drains/Airways       Peripheral Intravenous Line  Duration                  Peripheral IV - Single Lumen 12/21/23 0802 20 G Posterior;Proximal;Right Forearm <1 day                     Physical Exam  Constitutional:       Appearance: Normal appearance.   HENT:       Head: Normocephalic and atraumatic.   Cardiovascular:      Rate and Rhythm: Normal rate and regular rhythm.      Pulses: Normal pulses.      Heart sounds: Normal heart sounds.   Pulmonary:      Effort: Pulmonary effort is normal.      Breath sounds: Normal breath sounds.   Abdominal:      General: Abdomen is flat. There is distension.      Palpations: There is no mass.      Tenderness: There is no abdominal tenderness. There is right CVA tenderness. There is no left CVA tenderness, guarding or rebound.      Hernia: No hernia is present.   Musculoskeletal:         General: Normal range of motion.      Cervical back: Normal range of motion and neck supple.   Skin:     General: Skin is warm.   Neurological:      General: No focal deficit present.      Mental Status: She is alert and oriented to person, place, and time.      Cranial Nerves: No cranial nerve deficit.      Sensory: No sensory deficit.      Motor: No weakness.      Coordination: Coordination normal.      Gait: Gait normal.      Deep Tendon Reflexes: Reflexes normal.          Significant Labs:  CBC:   Recent Labs   Lab 12/21/23  0749 12/21/23  0834   WBC 5.29  --    HGB 11.2*  --    HCT 33.1* 25*   PLT 64*  --      CMP:   Recent Labs   Lab 12/21/23  0749   *   CALCIUM 9.4   ALBUMIN 3.3*   PROT 7.4   *   K 3.9   CO2 19*      BUN 40*   CREATININE 1.0   ALKPHOS 631*   ALT 73*   *   BILITOT 4.2*       Significant Imaging:  Imaging results within the past 24 hours have been reviewed.

## 2023-12-21 NOTE — ASSESSMENT & PLAN NOTE
The patient is being consulted to gastroenterology for dysphagia to liquid and solids, persistent nausea and vomiting.   The patient has on going dysphagia with solids since 2020, when she had EGD.  The patient also started having diarrhea, and abdominal; pain. Denied any chest pain, or difficulty in breathing.  Past medical history significant for GERD on pantaprazole, and liver transplant followed here in hepatology.   No acute distress.    Recommendations   The patient may have sever esophageal inflammation, or esophageal stricture leading to dysphagia with solids and liquids along with persistent vomiting.   Continue to manage for vomiting and nausea with anti-emetics and fluids   Continue to monitor for serum electrolytes.   We will perform EGD tomorrow in the morning.

## 2023-12-22 ENCOUNTER — ANESTHESIA EVENT (OUTPATIENT)
Dept: ENDOSCOPY | Facility: HOSPITAL | Age: 53
End: 2023-12-22
Payer: MEDICARE

## 2023-12-22 ENCOUNTER — ANESTHESIA (OUTPATIENT)
Dept: ENDOSCOPY | Facility: HOSPITAL | Age: 53
End: 2023-12-22
Payer: MEDICARE

## 2023-12-22 PROBLEM — R78.81 POSITIVE BLOOD CULTURE: Status: ACTIVE | Noted: 2023-12-22

## 2023-12-22 LAB
ACINETOBACTER CALCOACETICUS/BAUMANNII COMPLEX: NOT DETECTED
ALBUMIN SERPL BCP-MCNC: 2.8 G/DL (ref 3.5–5.2)
ALP SERPL-CCNC: 487 U/L (ref 55–135)
ALT SERPL W/O P-5'-P-CCNC: 60 U/L (ref 10–44)
ANION GAP SERPL CALC-SCNC: 10 MMOL/L (ref 8–16)
AST SERPL-CCNC: 75 U/L (ref 10–40)
BACTEROIDES FRAGILIS: NOT DETECTED
BASOPHILS # BLD AUTO: 0.03 K/UL (ref 0–0.2)
BASOPHILS NFR BLD: 0.3 % (ref 0–1.9)
BILIRUB SERPL-MCNC: 3.5 MG/DL (ref 0.1–1)
BUN SERPL-MCNC: 49 MG/DL (ref 6–20)
CALCIUM SERPL-MCNC: 8.6 MG/DL (ref 8.7–10.5)
CANDIDA ALBICANS: NOT DETECTED
CANDIDA AURIS: NOT DETECTED
CANDIDA GLABRATA: NOT DETECTED
CANDIDA KRUSEI: NOT DETECTED
CANDIDA PARAPSILOSIS: NOT DETECTED
CANDIDA TROPICALIS: NOT DETECTED
CHLORIDE SERPL-SCNC: 106 MMOL/L (ref 95–110)
CMV DNA SPEC QL NAA+PROBE: NORMAL
CO2 SERPL-SCNC: 19 MMOL/L (ref 23–29)
CREAT SERPL-MCNC: 1.5 MG/DL (ref 0.5–1.4)
CRYPTOCOCCUS NEOFORMANS/GATTII: NOT DETECTED
CTX-M GENE (ESBL PRODUCER): ABNORMAL
CYTOMEGALOVIRUS PCR, QUANT: NOT DETECTED IU/ML
DIFFERENTIAL METHOD BLD: ABNORMAL
ENTEROBACTER CLOACAE COMPLEX: NOT DETECTED
ENTEROBACTERALES: NOT DETECTED
ENTEROCOCCUS FAECALIS: NOT DETECTED
ENTEROCOCCUS FAECIUM: NOT DETECTED
EOSINOPHIL # BLD AUTO: 0 K/UL (ref 0–0.5)
EOSINOPHIL NFR BLD: 0.1 % (ref 0–8)
EPSTEIN-BARR VIRUS DNA: NORMAL
EPSTEIN-BARR VIRUS PCR, QUANT: NOT DETECTED IU/ML
ERYTHROCYTE [DISTWIDTH] IN BLOOD BY AUTOMATED COUNT: 14.6 % (ref 11.5–14.5)
ESCHERICHIA COLI: NOT DETECTED
EST. GFR  (NO RACE VARIABLE): 40.9 ML/MIN/1.73 M^2
GLUCOSE SERPL-MCNC: 90 MG/DL (ref 70–110)
HAEMOPHILUS INFLUENZAE: NOT DETECTED
HCT VFR BLD AUTO: 29.8 % (ref 37–48.5)
HGB BLD-MCNC: 9.8 G/DL (ref 12–16)
IMM GRANULOCYTES # BLD AUTO: 0.04 K/UL (ref 0–0.04)
IMM GRANULOCYTES NFR BLD AUTO: 0.4 % (ref 0–0.5)
IMP GENE (CARBAPENEM RESISTANT): ABNORMAL
INR PPP: 1.3 (ref 0.8–1.2)
KLEBSIELLA AEROGENES: NOT DETECTED
KLEBSIELLA OXYTOCA: NOT DETECTED
KLEBSIELLA PNEUMONIAE GROUP: NOT DETECTED
KPC RESISTANCE GENE (CARBAPENEM): ABNORMAL
LISTERIA MONOCYTOGENES: NOT DETECTED
LYMPHOCYTES # BLD AUTO: 0.5 K/UL (ref 1–4.8)
LYMPHOCYTES NFR BLD: 5.2 % (ref 18–48)
MCH RBC QN AUTO: 30.9 PG (ref 27–31)
MCHC RBC AUTO-ENTMCNC: 32.9 G/DL (ref 32–36)
MCR-1: ABNORMAL
MCV RBC AUTO: 94 FL (ref 82–98)
MEC A/C AND MREJ (MRSA): ABNORMAL
MEC A/C: ABNORMAL
MONOCYTES # BLD AUTO: 1 K/UL (ref 0.3–1)
MONOCYTES NFR BLD: 10.6 % (ref 4–15)
NDM GENE (CARBAPENEM RESISTANT): ABNORMAL
NEISSERIA MENINGITIDIS: NOT DETECTED
NEUTROPHILS # BLD AUTO: 7.9 K/UL (ref 1.8–7.7)
NEUTROPHILS NFR BLD: 83.4 % (ref 38–73)
NRBC BLD-RTO: 0 /100 WBC
OXA-48-LIKE (CARBAPENEM RESISTANT): ABNORMAL
PLATELET # BLD AUTO: 52 K/UL (ref 150–450)
PMV BLD AUTO: 12.9 FL (ref 9.2–12.9)
POTASSIUM SERPL-SCNC: 4.8 MMOL/L (ref 3.5–5.1)
PROT SERPL-MCNC: 6.3 G/DL (ref 6–8.4)
PROTEUS SPECIES: NOT DETECTED
PROTHROMBIN TIME: 13.8 SEC (ref 9–12.5)
PSEUDOMONAS AERUGINOSA: NOT DETECTED
RBC # BLD AUTO: 3.17 M/UL (ref 4–5.4)
SALMONELLA SP: NOT DETECTED
SERRATIA MARCESCENS: NOT DETECTED
SODIUM SERPL-SCNC: 135 MMOL/L (ref 136–145)
STAPHYLOCOCCUS AUREUS: NOT DETECTED
STAPHYLOCOCCUS EPIDERMIDIS: NOT DETECTED
STAPHYLOCOCCUS LUGDUNESIS: NOT DETECTED
STAPHYLOCOCCUS SPECIES: NOT DETECTED
STENOTROPHOMONAS MALTOPHILIA: NOT DETECTED
STREPTOCOCCUS AGALACTIAE: NOT DETECTED
STREPTOCOCCUS PNEUMONIAE: NOT DETECTED
STREPTOCOCCUS PYOGENES: NOT DETECTED
STREPTOCOCCUS SPECIES: DETECTED
TACROLIMUS BLD-MCNC: 6.2 NG/ML (ref 5–15)
VAN A/B (VRE GENE): ABNORMAL
VIM GENE (CARBAPENEM RESISTANT): ABNORMAL
WBC # BLD AUTO: 9.45 K/UL (ref 3.9–12.7)

## 2023-12-22 PROCEDURE — 88312 SPECIAL STAINS GROUP 1: CPT | Performed by: PATHOLOGY

## 2023-12-22 PROCEDURE — 94761 N-INVAS EAR/PLS OXIMETRY MLT: CPT

## 2023-12-22 PROCEDURE — 25000003 PHARM REV CODE 250: Performed by: STUDENT IN AN ORGANIZED HEALTH CARE EDUCATION/TRAINING PROGRAM

## 2023-12-22 PROCEDURE — 88312 SPECIAL STAINS GROUP 1: CPT | Mod: 26,,, | Performed by: PATHOLOGY

## 2023-12-22 PROCEDURE — 43239 EGD BIOPSY SINGLE/MULTIPLE: CPT | Performed by: INTERNAL MEDICINE

## 2023-12-22 PROCEDURE — 63600175 PHARM REV CODE 636 W HCPCS: Performed by: STUDENT IN AN ORGANIZED HEALTH CARE EDUCATION/TRAINING PROGRAM

## 2023-12-22 PROCEDURE — 85025 COMPLETE CBC W/AUTO DIFF WBC: CPT | Performed by: STUDENT IN AN ORGANIZED HEALTH CARE EDUCATION/TRAINING PROGRAM

## 2023-12-22 PROCEDURE — 96376 TX/PRO/DX INJ SAME DRUG ADON: CPT | Mod: 59

## 2023-12-22 PROCEDURE — G0378 HOSPITAL OBSERVATION PER HR: HCPCS

## 2023-12-22 PROCEDURE — 88341 IMHCHEM/IMCYTCHM EA ADD ANTB: CPT | Performed by: PATHOLOGY

## 2023-12-22 PROCEDURE — 99215 OFFICE O/P EST HI 40 MIN: CPT | Mod: ,,, | Performed by: INTERNAL MEDICINE

## 2023-12-22 PROCEDURE — 88341 IMHCHEM/IMCYTCHM EA ADD ANTB: CPT | Mod: 26,,, | Performed by: PATHOLOGY

## 2023-12-22 PROCEDURE — D9220A PRA ANESTHESIA: Mod: CRNA,,, | Performed by: NURSE ANESTHETIST, CERTIFIED REGISTERED

## 2023-12-22 PROCEDURE — 27201012 HC FORCEPS, HOT/COLD, DISP: Performed by: INTERNAL MEDICINE

## 2023-12-22 PROCEDURE — 43239 EGD BIOPSY SINGLE/MULTIPLE: CPT | Mod: GC,,, | Performed by: INTERNAL MEDICINE

## 2023-12-22 PROCEDURE — C9113 INJ PANTOPRAZOLE SODIUM, VIA: HCPCS | Performed by: STUDENT IN AN ORGANIZED HEALTH CARE EDUCATION/TRAINING PROGRAM

## 2023-12-22 PROCEDURE — 85610 PROTHROMBIN TIME: CPT | Performed by: STUDENT IN AN ORGANIZED HEALTH CARE EDUCATION/TRAINING PROGRAM

## 2023-12-22 PROCEDURE — 88342 IMHCHEM/IMCYTCHM 1ST ANTB: CPT | Performed by: PATHOLOGY

## 2023-12-22 PROCEDURE — 63600175 PHARM REV CODE 636 W HCPCS

## 2023-12-22 PROCEDURE — 99215 OFFICE O/P EST HI 40 MIN: CPT | Mod: GC,,, | Performed by: INTERNAL MEDICINE

## 2023-12-22 PROCEDURE — 80197 ASSAY OF TACROLIMUS: CPT | Performed by: STUDENT IN AN ORGANIZED HEALTH CARE EDUCATION/TRAINING PROGRAM

## 2023-12-22 PROCEDURE — 88305 TISSUE EXAM BY PATHOLOGIST: CPT | Performed by: PATHOLOGY

## 2023-12-22 PROCEDURE — 37000008 HC ANESTHESIA 1ST 15 MINUTES: Performed by: INTERNAL MEDICINE

## 2023-12-22 PROCEDURE — D9220A PRA ANESTHESIA: ICD-10-PCS | Mod: ANES,,, | Performed by: ANESTHESIOLOGY

## 2023-12-22 PROCEDURE — 63600175 PHARM REV CODE 636 W HCPCS: Performed by: HOSPITALIST

## 2023-12-22 PROCEDURE — 80053 COMPREHEN METABOLIC PANEL: CPT | Performed by: STUDENT IN AN ORGANIZED HEALTH CARE EDUCATION/TRAINING PROGRAM

## 2023-12-22 PROCEDURE — 37000009 HC ANESTHESIA EA ADD 15 MINS: Performed by: INTERNAL MEDICINE

## 2023-12-22 PROCEDURE — 25000003 PHARM REV CODE 250: Performed by: NURSE ANESTHETIST, CERTIFIED REGISTERED

## 2023-12-22 PROCEDURE — 25500020 PHARM REV CODE 255: Performed by: STUDENT IN AN ORGANIZED HEALTH CARE EDUCATION/TRAINING PROGRAM

## 2023-12-22 PROCEDURE — 96375 TX/PRO/DX INJ NEW DRUG ADDON: CPT

## 2023-12-22 PROCEDURE — 88342 IMHCHEM/IMCYTCHM 1ST ANTB: CPT | Mod: 26,,, | Performed by: PATHOLOGY

## 2023-12-22 PROCEDURE — 63600175 PHARM REV CODE 636 W HCPCS: Performed by: NURSE ANESTHETIST, CERTIFIED REGISTERED

## 2023-12-22 PROCEDURE — 25500020 PHARM REV CODE 255

## 2023-12-22 PROCEDURE — D9220A PRA ANESTHESIA: ICD-10-PCS | Mod: CRNA,,, | Performed by: NURSE ANESTHETIST, CERTIFIED REGISTERED

## 2023-12-22 PROCEDURE — D9220A PRA ANESTHESIA: Mod: ANES,,, | Performed by: ANESTHESIOLOGY

## 2023-12-22 PROCEDURE — 96374 THER/PROPH/DIAG INJ IV PUSH: CPT | Mod: 59

## 2023-12-22 PROCEDURE — 36415 COLL VENOUS BLD VENIPUNCTURE: CPT | Performed by: STUDENT IN AN ORGANIZED HEALTH CARE EDUCATION/TRAINING PROGRAM

## 2023-12-22 PROCEDURE — 88305 TISSUE EXAM BY PATHOLOGIST: CPT | Mod: 26,,, | Performed by: PATHOLOGY

## 2023-12-22 RX ORDER — SODIUM CHLORIDE 0.9 % (FLUSH) 0.9 %
10 SYRINGE (ML) INJECTION
Status: DISCONTINUED | OUTPATIENT
Start: 2023-12-22 | End: 2023-12-22 | Stop reason: HOSPADM

## 2023-12-22 RX ORDER — TACROLIMUS 0.5 MG/1
0.5 CAPSULE ORAL EVERY MORNING
Status: DISCONTINUED | OUTPATIENT
Start: 2023-12-23 | End: 2023-12-24 | Stop reason: ALTCHOICE

## 2023-12-22 RX ORDER — PROPOFOL 10 MG/ML
VIAL (ML) INTRAVENOUS
Status: DISCONTINUED | OUTPATIENT
Start: 2023-12-22 | End: 2023-12-22

## 2023-12-22 RX ORDER — HYDROMORPHONE HYDROCHLORIDE 1 MG/ML
0.5 INJECTION, SOLUTION INTRAMUSCULAR; INTRAVENOUS; SUBCUTANEOUS EVERY 4 HOURS PRN
Status: DISCONTINUED | OUTPATIENT
Start: 2023-12-22 | End: 2023-12-24 | Stop reason: HOSPADM

## 2023-12-22 RX ORDER — PROCHLORPERAZINE EDISYLATE 5 MG/ML
5 INJECTION INTRAMUSCULAR; INTRAVENOUS EVERY 6 HOURS PRN
Status: DISCONTINUED | OUTPATIENT
Start: 2023-12-22 | End: 2023-12-24 | Stop reason: HOSPADM

## 2023-12-22 RX ORDER — HALOPERIDOL 5 MG/ML
INJECTION INTRAMUSCULAR
Status: COMPLETED
Start: 2023-12-22 | End: 2023-12-22

## 2023-12-22 RX ORDER — DIPHENHYDRAMINE HCL 25 MG
25 CAPSULE ORAL EVERY 6 HOURS PRN
Status: DISCONTINUED | OUTPATIENT
Start: 2023-12-22 | End: 2023-12-22

## 2023-12-22 RX ORDER — PANTOPRAZOLE SODIUM 40 MG/10ML
40 INJECTION, POWDER, LYOPHILIZED, FOR SOLUTION INTRAVENOUS 2 TIMES DAILY
Status: DISCONTINUED | OUTPATIENT
Start: 2023-12-22 | End: 2023-12-24 | Stop reason: HOSPADM

## 2023-12-22 RX ORDER — SUCCINYLCHOLINE CHLORIDE 20 MG/ML
INJECTION INTRAMUSCULAR; INTRAVENOUS
Status: DISCONTINUED | OUTPATIENT
Start: 2023-12-22 | End: 2023-12-22

## 2023-12-22 RX ORDER — DIPHENHYDRAMINE HYDROCHLORIDE 50 MG/ML
12.5 INJECTION, SOLUTION INTRAMUSCULAR; INTRAVENOUS 3 TIMES DAILY PRN
Status: DISCONTINUED | OUTPATIENT
Start: 2023-12-22 | End: 2023-12-24 | Stop reason: HOSPADM

## 2023-12-22 RX ORDER — HALOPERIDOL 5 MG/ML
0.5 INJECTION INTRAMUSCULAR EVERY 10 MIN PRN
Status: DISCONTINUED | OUTPATIENT
Start: 2023-12-22 | End: 2023-12-22 | Stop reason: HOSPADM

## 2023-12-22 RX ORDER — ROCURONIUM BROMIDE 10 MG/ML
INJECTION, SOLUTION INTRAVENOUS
Status: DISCONTINUED | OUTPATIENT
Start: 2023-12-22 | End: 2023-12-22

## 2023-12-22 RX ORDER — FENTANYL CITRATE 50 UG/ML
25 INJECTION, SOLUTION INTRAMUSCULAR; INTRAVENOUS EVERY 5 MIN PRN
Status: DISCONTINUED | OUTPATIENT
Start: 2023-12-22 | End: 2023-12-22 | Stop reason: HOSPADM

## 2023-12-22 RX ORDER — TACROLIMUS 0.5 MG/1
0.5 CAPSULE ORAL EVERY EVENING
Status: DISCONTINUED | OUTPATIENT
Start: 2023-12-22 | End: 2023-12-22

## 2023-12-22 RX ORDER — ONDANSETRON 2 MG/ML
4 INJECTION INTRAMUSCULAR; INTRAVENOUS EVERY 6 HOURS
Status: DISCONTINUED | OUTPATIENT
Start: 2023-12-22 | End: 2023-12-24 | Stop reason: HOSPADM

## 2023-12-22 RX ORDER — ONDANSETRON 2 MG/ML
INJECTION INTRAMUSCULAR; INTRAVENOUS
Status: DISCONTINUED | OUTPATIENT
Start: 2023-12-22 | End: 2023-12-22

## 2023-12-22 RX ORDER — DIPHENHYDRAMINE HYDROCHLORIDE 50 MG/ML
25 INJECTION, SOLUTION INTRAMUSCULAR; INTRAVENOUS ONCE
Status: COMPLETED | OUTPATIENT
Start: 2023-12-22 | End: 2023-12-22

## 2023-12-22 RX ORDER — FENTANYL CITRATE 50 UG/ML
INJECTION, SOLUTION INTRAMUSCULAR; INTRAVENOUS
Status: DISCONTINUED | OUTPATIENT
Start: 2023-12-22 | End: 2023-12-22

## 2023-12-22 RX ADMIN — ONDANSETRON 4 MG: 2 INJECTION INTRAMUSCULAR; INTRAVENOUS at 11:12

## 2023-12-22 RX ADMIN — ROCURONIUM BROMIDE 10 MG: 10 INJECTION INTRAVENOUS at 09:12

## 2023-12-22 RX ADMIN — ONDANSETRON 4 MG: 2 INJECTION INTRAMUSCULAR; INTRAVENOUS at 07:12

## 2023-12-22 RX ADMIN — IOHEXOL 75 ML: 350 INJECTION, SOLUTION INTRAVENOUS at 02:12

## 2023-12-22 RX ADMIN — HALOPERIDOL 0.5 MG: 5 INJECTION INTRAMUSCULAR at 10:12

## 2023-12-22 RX ADMIN — PANTOPRAZOLE SODIUM 40 MG: 40 INJECTION, POWDER, FOR SOLUTION INTRAVENOUS at 05:12

## 2023-12-22 RX ADMIN — HYDROMORPHONE HYDROCHLORIDE 0.5 MG: 0.5 INJECTION, SOLUTION INTRAMUSCULAR; INTRAVENOUS; SUBCUTANEOUS at 03:12

## 2023-12-22 RX ADMIN — HYDROMORPHONE HYDROCHLORIDE 0.5 MG: 0.5 INJECTION, SOLUTION INTRAMUSCULAR; INTRAVENOUS; SUBCUTANEOUS at 05:12

## 2023-12-22 RX ADMIN — SODIUM CHLORIDE: 9 INJECTION, SOLUTION INTRAVENOUS at 09:12

## 2023-12-22 RX ADMIN — ONDANSETRON 4 MG: 2 INJECTION INTRAMUSCULAR; INTRAVENOUS at 10:12

## 2023-12-22 RX ADMIN — HYDROMORPHONE HYDROCHLORIDE 0.5 MG: 0.5 INJECTION, SOLUTION INTRAMUSCULAR; INTRAVENOUS; SUBCUTANEOUS at 10:12

## 2023-12-22 RX ADMIN — PROPOFOL 130 MG: 10 INJECTION, EMULSION INTRAVENOUS at 09:12

## 2023-12-22 RX ADMIN — DIPHENHYDRAMINE HYDROCHLORIDE 25 MG: 50 INJECTION, SOLUTION INTRAMUSCULAR; INTRAVENOUS at 06:12

## 2023-12-22 RX ADMIN — HYDROMORPHONE HYDROCHLORIDE 0.5 MG: 0.5 INJECTION, SOLUTION INTRAMUSCULAR; INTRAVENOUS; SUBCUTANEOUS at 01:12

## 2023-12-22 RX ADMIN — HALOPERIDOL LACTATE 0.5 MG: 5 INJECTION, SOLUTION INTRAMUSCULAR at 10:12

## 2023-12-22 RX ADMIN — CEFTRIAXONE 2 G: 2 INJECTION, POWDER, FOR SOLUTION INTRAMUSCULAR; INTRAVENOUS at 01:12

## 2023-12-22 RX ADMIN — FENTANYL CITRATE 50 MCG: 50 INJECTION, SOLUTION INTRAMUSCULAR; INTRAVENOUS at 09:12

## 2023-12-22 RX ADMIN — SUCCINYLCHOLINE CHLORIDE 120 MG: 20 INJECTION, SOLUTION INTRAMUSCULAR; INTRAVENOUS at 09:12

## 2023-12-22 RX ADMIN — VANCOMYCIN HYDROCHLORIDE 1000 MG: 1 INJECTION, POWDER, LYOPHILIZED, FOR SOLUTION INTRAVENOUS at 10:12

## 2023-12-22 RX ADMIN — HYDROMORPHONE HYDROCHLORIDE 0.5 MG: 0.5 INJECTION, SOLUTION INTRAMUSCULAR; INTRAVENOUS; SUBCUTANEOUS at 07:12

## 2023-12-22 RX ADMIN — ONDANSETRON 4 MG: 2 INJECTION INTRAMUSCULAR; INTRAVENOUS at 05:12

## 2023-12-22 RX ADMIN — IOHEXOL 15 ML: 350 INJECTION, SOLUTION INTRAVENOUS at 02:12

## 2023-12-22 NOTE — ASSESSMENT & PLAN NOTE
Patient has chronic liver disease due to  Von Gierke disease . They have a history of ascites/volume overload and hepatic encephalopathy. Their liver disease is decompensated due to ascites/volume overload and portal hypertension. Hepatology has been consulted. The patient is not on the liver transplant list. We will obtain daily CBC, CMP, and INR. Their most current Na-MELD is listed below.  MELD 3.0: 16 at 2023  7:49 AM  MELD-Na: 15 at 2023  7:49 AM  Calculated from:  Serum Creatinine: 1.0 mg/dL at 2023  7:49 AM  Serum Sodium: 135 mmol/L at 2023  7:49 AM  Total Bilirubin: 4.2 mg/dL at 2023  7:49 AM  Serum Albumin: 3.3 g/dL at 2023  7:49 AM  INR(ratio): 1.1 at 2023  7:49 AM  Age at listin years  Sex: Female at 2023  7:49 AM      - ammonia elevated but at baseline, no encephalopathy, cont lactulose titrate and hold for diarrhea  - hepatology consulted  - CT: Cirrhotic liver morphology with stigmata of portal hypertension including splenomegaly, collateral vessels, and trace ascites.   - liver u/s: Elevated intraparenchymal resistive indices which may be seen with rejection fluid overload, or chronic liver disease.

## 2023-12-22 NOTE — ASSESSMENT & PLAN NOTE
54 yo female with PMH of dysphagia, HTN, Von Gierke disease s/p liver transplant in 2002 cb graft cirrhosis due to biliary stricture and chronic rejection, SIADH, and hypothyroidism who presents with a complaint of nausea, vomiting, abdominal pain, diarrhea. Was found to have GPCs in 1/4 bottles, BCID + strep spp.     Pro naveed 1.7. Hep A, Hep B, Hep C negative. UA without concerns. CT AP noting partial obstruction in esophagus, cirrhotic liver morphology, splenomegaly, trace ascites, colitis of ascending colon, gastritis, mild anasarca, and small right pleural effusion. Liver ultrasound noting elevated intraparenchymal resistive indices, noting rejection fluid overload vs chronic liver disease.     IR following and attempted paracentesis, but noting there wasn't a safe window of fluid and therefore procedure was cancelled. Gen surgery following and planning EGD on 12/23, though pt is s/p EGD today, procedure note with 9 cm mucosal tear which was biopsied, noting esophageal stenosis near EGJ with esophagitis, pyloric stenosis.    Pt is currently afebrile, HDS. On vanc and ceftriaxone. GI and hepatology are following. ID was consulted for antibiotic recs.     Recommendations  - okay to continue vanc and ceftriaxone.   - repeat blood cultures   - will follow blood cultures and tailor as needed  - will follow GI and gen surg plans   - plan reviewed with ID staff, Dr. Borrego. ID will follow

## 2023-12-22 NOTE — NURSING TRANSFER
Nursing Transfer Note      12/22/2023   10:50 AM    Nurse giving handoff:JEIMY Loza RN  Nurse receiving handoff:Jade CLOUD    Reason patient is being transferred: meets criteria    Transfer To: [id number]    Transfer via stretcher    Transfer with IV pole    Transported by PCT      Medicines sent: vanc    Any special needs or follow-up needed: none    Patient belongings transferred with patient:  not at bedside    Chart send with patient: Yes

## 2023-12-22 NOTE — CONSULTS
Pharmacokinetic Initial Assessment: IV Vancomycin    Assessment/Plan:    -Initiate vancomycin 1g IVPB x1 (20 mg/kg, 50 kg)  -Scr increased from 1 to 1.5 today  -Goal level 15 - 20 for bacteremia  -Random level tomorrow AM, plans for re-dosing with levels < 20     Please contact pharmacy at extension [phone] with any questions regarding this assessment.     Thank you for the consult,   Rojelio Caldwellrichard       Patient brief summary:  Elda Hernandez is a 53 y.o. female initiated on antimicrobial therapy with IV Vancomycin for treatment of suspected bacteremia    Drug Allergies:   Review of patient's allergies indicates:   Allergen Reactions    Codeine Itching     Other reaction(s): Itching    Lipitor [atorvastatin] Other (See Comments)     Other reaction(s): Muscle pain  Muscle cranmps    Morphine Itching     Other reaction(s): nausea and vomiting     Zoloft [sertraline] Other (See Comments)     Tremors/muscle spasms       Actual Body Weight:   50 kg    Renal Function:   Estimated Creatinine Clearance: 32.6 mL/min (A) (based on SCr of 1.5 mg/dL (H)).,     Dialysis Method (if applicable):  N/A    CBC (last 72 hours):  Recent Labs   Lab Result Units 12/21/23  0749 12/22/23  0516   WBC K/uL 5.29 9.45   Hemoglobin g/dL 11.2* 9.8*   Hematocrit % 33.1* 29.8*   Platelets K/uL 64* 52*   Gran % % 82.6* 83.4*   Lymph % % 8.5* 5.2*   Mono % % 6.2 10.6   Eosinophil % % 1.5 0.1   Basophil % % 0.8 0.3   Differential Method  Automated Automated       Metabolic Panel (last 72 hours):  Recent Labs   Lab Result Units 12/21/23  0749 12/21/23  1723 12/22/23  0516   Sodium mmol/L 135*  --  135*   Potassium mmol/L 3.9  --  4.8   Chloride mmol/L 105  --  106   CO2 mmol/L 19*  --  19*   Glucose mg/dL 117*  --  90   Glucose, UA   --  Negative  --    BUN mg/dL 40*  --  49*   Creatinine mg/dL 1.0  --  1.5*   Albumin g/dL 3.3*  --  2.8*   Total Bilirubin mg/dL 4.2*  --  3.5*   Alkaline Phosphatase U/L 631*  --  487*   AST U/L 103*  --  75*   ALT U/L  "73*  --  60*   Magnesium mg/dL 2.2  --   --    Phosphorus mg/dL 4.3  --   --        Drug levels (last 3 results):  No results for input(s): "VANCOMYCINRA", "VANCORANDOM", "VANCOMYCINPE", "VANCOPEAK", "VANCOMYCINTR", "VANCOTROUGH" in the last 72 hours.    Microbiologic Results:  Microbiology Results (last 7 days)       Procedure Component Value Units Date/Time    Blood culture [[id number]]     Order Status: No result Specimen: Blood     Blood culture [[id number]]     Order Status: No result Specimen: Blood     Blood culture x two cultures. Draw prior to antibiotics. [[id number]] Collected: 12/21/23 0749    Order Status: Completed Specimen: Blood from Peripheral, Antecubital, Right Updated: 12/22/23 0812     Blood Culture, Routine No Growth to date      No Growth to date    Narrative:      Aerobic and anaerobic    Rapid Organism ID by PCR (from Blood culture) [[id number]]  (Abnormal) Collected: 12/21/23 0807    Order Status: Completed Updated: 12/22/23 0749     Enterococcus faecalis Not Detected     Enterococcus faecium Not Detected     Listeria monocytogenes Not Detected     Staphylococcus spp. Not Detected     Staphylococcus aureus Not Detected     Staphylococcus epidermidis Not Detected     Staphylococcus lugdunensis Not Detected     Streptococcus species Detected     Streptococcus agalactiae Not Detected     Streptococcus pneumoniae Not Detected     Streptococcus pyogenes Not Detected     Acinetobacter calcoaceticus/baumannii complex Not Detected     Bacteroides fragilis Not Detected     Enterobacterales Not Detected     Enterobacter cloacae complex Not Detected     Escherichia coli Not Detected     Klebsiella aerogenes Not Detected     Klebsiella oxytoca Not Detected     Klebsiella pneumoniae group Not Detected     Proteus Not Detected     Salmonella sp Not Detected     Serratia marcescens Not Detected     Haemophilus influenzae Not Detected     Neisseria meningtidis Not Detected     Pseudomonas aeruginosa Not " Detected     Stenotrophomonas maltophilia Not Detected     Candida albicans Not Detected     Candida auris Not Detected     Candida glabrata Not Detected     Candida krusei Not Detected     Candida parapsilosis Not Detected     Candida tropicalis Not Detected     Cryptococcus neoformans/gattii Not Detected     CTX-M (ESBL ) Test Not Applicable     IMP (Carbapenem resistant) Test Not Applicable     KPC resistance gene (Carbapenem resistant) Test Not Applicable     mcr-1  Test Not Applicable     mec A/C  Test Not Applicable     mec A/C and MREJ (MRSA) gene Test Not Applicable     NDM (Carbapenem resistant) Test Not Applicable     OXA-48-like (Carbapenem resistant) Test Not Applicable     van A/B (VRE gene) Test Not Applicable     VIM (Carbapenem resistant) Test Not Applicable    Narrative:      Aerobic and anaerobic    Blood culture x two cultures. Draw prior to antibiotics. [[id number]] Collected: 12/21/23 0807    Order Status: Completed Specimen: Blood from Peripheral, Forearm, Right Updated: 12/22/23 0559     Blood Culture, Routine Gram stain tommie bottle: Gram positive cocci in chains resembling Strep      Results called to and read back by: Daniela Ott RN    Narrative:      Aerobic and anaerobic    (rule out SBP) Aerobic culture [[id number]]     Order Status: No result Specimen: Ascites     (rule out SBP) Culture, Anaerobic [[id number]]     Order Status: No result Specimen: Ascites     (rule out SBP) Gram stain [[id number]]     Order Status: No result Specimen: Ascites     Clostridium difficile EIA [[id number]]     Order Status: Canceled Specimen: Stool

## 2023-12-22 NOTE — SUBJECTIVE & OBJECTIVE
Interval History: Patient reporting improvement in N/V, wants to eat.   EGD with ulceration concern for perforation recommended CT which was concerning for leak recommend esophagram, ordered STAT. Keep strict NPO until results.     Review of Systems   Constitutional:  Negative for fever.   Respiratory:  Negative for shortness of breath.    Gastrointestinal:  Positive for abdominal pain, nausea and vomiting.   Psychiatric/Behavioral:  Negative for confusion.      Objective:     Vital Signs (Most Recent):  Temp: 98.5 °F (36.9 °C) (12/22/23 1531)  Pulse: 78 (12/22/23 1531)  Resp: 20 (12/22/23 1531)  BP: 137/64 (12/22/23 1210)  SpO2: (!) 92 % (12/22/23 1531) Vital Signs (24h Range):  Temp:  [97.9 °F (36.6 °C)-99 °F (37.2 °C)] 98.5 °F (36.9 °C)  Pulse:  [68-84] 78  Resp:  [15-20] 20  SpO2:  [92 %-98 %] 92 %  BP: (105-137)/(52-64) 137/64     Weight: 50.8 kg (112 lb)  Body mass index is 22.61 kg/m².    Intake/Output Summary (Last 24 hours) at 12/22/2023 1653  Last data filed at 12/22/2023 1018  Gross per 24 hour   Intake 950 ml   Output 700 ml   Net 250 ml         Physical Exam  Vitals and nursing note reviewed.   Constitutional:       General: She is not in acute distress.     Appearance: Normal appearance. She is not ill-appearing.   HENT:      Head: Normocephalic and atraumatic.      Right Ear: External ear normal.      Left Ear: External ear normal.      Nose: Nose normal.      Mouth/Throat:      Mouth: Mucous membranes are moist.      Pharynx: Oropharynx is clear.   Eyes:      General: Scleral icterus present.      Extraocular Movements: Extraocular movements intact.      Conjunctiva/sclera: Conjunctivae normal.      Pupils: Pupils are equal, round, and reactive to light.   Cardiovascular:      Rate and Rhythm: Normal rate and regular rhythm.      Pulses: Normal pulses.      Heart sounds: Murmur heard.   Pulmonary:      Effort: Pulmonary effort is normal.      Breath sounds: Normal breath sounds.   Abdominal:       General: Bowel sounds are normal. There is distension.      Palpations: Abdomen is soft.      Tenderness: There is abdominal tenderness.   Musculoskeletal:         General: Normal range of motion.      Cervical back: Normal range of motion and neck supple.      Right lower leg: No edema.      Left lower leg: No edema.   Skin:     General: Skin is warm and dry.      Coloration: Skin is jaundiced.   Neurological:      General: No focal deficit present.      Mental Status: She is alert and oriented to person, place, and time.   Psychiatric:         Mood and Affect: Mood normal.         Behavior: Behavior normal.             Significant Labs: All pertinent labs within the past 24 hours have been reviewed.    Significant Imaging: I have reviewed all pertinent imaging results/findings within the past 24 hours.

## 2023-12-22 NOTE — ASSESSMENT & PLAN NOTE
- para ordered concern for SBP given known ascites and liver disease- unable to do para, not enough fluid  - elevated procal and WBC from baseline, known immunosuppression   - started on CTX, vanc  - blood cultures + see bacteremia, UA noninfectious

## 2023-12-22 NOTE — ASSESSMENT & PLAN NOTE
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 Was found to have GPCs in 1/4 bottles, BCID + strep spp.     Pro naveed 1.7. Hep A, Hep B, Hep C negative. UA without concerns. CT AP noting partial obstruction in esophagus, cirrhotic liver morphology, splenomegaly, trace ascites, colitis of ascending colon, gastritis, mild anasarca, and small right pleural effusion. Liver ultrasound noting elevated intraparenchymal resistive indices, noting rejection fluid overload vs chronic liver disease.     IR following and attempted paracentesis, but noting there wasn't a safe window of fluid and therefore procedure was cancelled. Gen surgery following and planning EGD on 12/23, though pt is s/p EGD today, procedure note with 9 cm mucosal tear which was biopsied, noting esophageal stenosis near EGJ with esophagitis, pyloric stenosis.    Pt is currently afebrile, HDS. On vanc and ceftriaxone. GI and hepatology are following. ID was consulted for antibiotic recs.     Recommendations  - okay to continue vanc and ceftriaxone.   - repeat blood cultures   - will follow blood cultures and tailor as needed  - will follow GI and gen surg plans   - plan reviewed with ID staff, Dr. Borrego. ID will follow

## 2023-12-22 NOTE — CONSULTS
[facility] - Telemetry Stepdown  Infectious Disease  Consult Note    Patient Name: Elda Hernanedz  MRN: [id number]  Admission Date: 12/21/2023  Hospital Length of Stay: 0 days  Attending Physician: Sangita Hayes MD  Primary Care Provider: David Lorenzo MD     Isolation Status: Special Contact    See progress note from 12/22          Inpatient consult to Infectious Diseases  Consult performed by: Portia Jean NP  Consult ordered by: Sangita Hayes MD Reghan R. DeVay, NP  Infectious Disease  [facility] Tele[facility] Stepdown

## 2023-12-22 NOTE — ASSESSMENT & PLAN NOTE
- long standing history of dysphasia to solids, had been tolerating liquids  - now with N/V intolerance of all PO  - CT with Significant distension of the lower esophagus with fluid and debris, noting a prominent hyperdense structure at the gastroesophageal junction which could result in partial obstruction of the esophagus   - GI consulted and following  - EGD with ulceration and concern for perforation- CT with oral contrast showing Upper esophagus extraluminal air foci concerning for leak.  Additional more distal esophageal contrast outpouching which may represent esophageal ulcer however can also represent areas of contained leak.  Recommend further evaluation with water-soluble esophagram.   - esophogram ordered  - antiemetics scheduled, npo holding fluids given liver disease

## 2023-12-22 NOTE — HPI
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 Pt has had frequent hospitalizations, most recently in March of 2023, and since has been on liquid diet. Pt reports she is unable to tolerate solid food as it is hard for her to swallow and has issue with the taste. Pt states her diet consists of 3 boost drinks and 1 smoothie a day. Pt reports abdominal pain, nausea. Denies sick contacts. Reports compliance with medication.     Since admission, pt without leukocytosis. Pro naveed 1.7. Hep A antibody negative. Hep b screen negative. Hep C screen negative. UA without infectious concerns. Blood cultures + GPC resembling strep in 1/4 bottle, BCID + strep sp. Repeat blood cultures pending. Chest xray negative for acute findings. CT head negative for acute findings. CT AP noting partial obstruction in esophagus, cirrhotic liver morphology, splenomegaly, trace ascites, colitis of ascending colon, gastritis, mild anasarca, and small right pleural effusion. Liver ultrasound noting elevated intraparenchymal resistive indices, noting rejection fluid overload vs chronic liver disease.     IR following and attempted paracentesis, but noting there wasn't a safe window of fluid and therefore procedure was cancelled. Pt is s/p endoscopy today, noting 9 cm mucosal tear which was biopsied, noting esophageal stenosis near EGJ with esophagitis, pyloric stenosis. Gen surgery following and planning EGD on 12/23, cf severe esophageal inflammation or esophageal stricture, and noting significant distension of lower esophagus with fluid and debris, as well as hyperdense structure at GEJ.     Pt is currently afebrile, HDS. On vanc and ceftriaxone.     GI and hepatology are following. ID was consulted for antibiotic recs.

## 2023-12-22 NOTE — ASSESSMENT & PLAN NOTE
Patient with acute kidney injury/acute renal failure likely due to pre-renal azotemia due to dehydration JACQUE is currently improving. Baseline creatinine  1  - Labs reviewed- Renal function/electrolytes with Estimated Creatinine Clearance: 40.8 mL/min (based on SCr of 1.2 mg/dL). according to latest data. Monitor urine output and serial BMP and adjust therapy as needed. Avoid nephrotoxins and renally dose meds for GFR listed above.

## 2023-12-22 NOTE — ASSESSMENT & PLAN NOTE
Patient has chronic liver disease due to  Von Gierke disease . They have a history of ascites/volume overload and hepatic encephalopathy. Their liver disease is decompensated due to ascites/volume overload and portal hypertension. Hepatology has been consulted. The patient is not on the liver transplant list. We will obtain daily CBC, CMP, and INR. Their most current Na-MELD is listed below.  MELD 3.0: 22 at 2023  5:16 AM  MELD-Na: 19 at 2023  5:16 AM  Calculated from:  Serum Creatinine: 1.5 mg/dL at 2023  5:16 AM  Serum Sodium: 135 mmol/L at 2023  5:16 AM  Total Bilirubin: 3.5 mg/dL at 2023  5:16 AM  Serum Albumin: 2.8 g/dL at 2023  5:16 AM  INR(ratio): 1.3 at 2023  5:16 AM  Age at listin years  Sex: Female at 2023  5:16 AM      - ammonia elevated but at baseline, no encephalopathy, cont lactulose titrate and hold for diarrhea  - hepatology consulted and following  - CT: Cirrhotic liver morphology with stigmata of portal hypertension including splenomegaly, collateral vessels, and trace ascites.   - liver u/s: Elevated intraparenchymal resistive indices which may be seen with rejection fluid overload, or chronic liver disease.   - unable to do para, not enough fluid for safe para, cont CTX

## 2023-12-22 NOTE — PROGRESS NOTES
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 Was found to have GPCs in 1/4 bottles, BCID + strep spp.     Pro naveed 1.7. Hep A, Hep B, Hep C negative. UA without concerns. CT AP noting partial obstruction in esophagus, cirrhotic liver morphology, splenomegaly, trace ascites, colitis of ascending colon, gastritis, mild anasarca, and small right pleural effusion. Liver ultrasound noting elevated intraparenchymal resistive indices, noting rejection fluid overload vs chronic liver disease.     IR following and attempted paracentesis, but noting there wasn't a safe window of fluid and therefore procedure was cancelled. Gen surgery following and planning EGD on 12/23, though pt is s/p EGD today, procedure note with 9 cm mucosal tear which was biopsied, noting esophageal stenosis near EGJ with esophagitis, pyloric stenosis.    Pt is currently afebrile, HDS. On vanc and ceftriaxone. GI and hepatology are following. ID was consulted for antibiotic recs.     Recommendations  - okay to continue vanc and ceftriaxone.   - repeat blood cultures   - will follow blood cultures and tailor as needed  - will follow GI and gen surg plans   - pt seen and plan reviewed with ID staff, Dr. Borrego. ID will follow                 Thank you for your consult. I will follow-up with patient. Please contact us if you have any additional questions.    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 Pt has had frequent hospitalizations, most recently in March of 2023, and since has been on liquid diet. Pt reports she is unable to tolerate solid food as it is hard for her to swallow and has issue with the taste. Pt states her diet consists of 3 boost drinks and 1 smoothie a day. Pt reports abdominal pain, nausea. Denies sick contacts. Reports compliance with medication.     Since admission, pt without leukocytosis. Pro naveed 1.7. Hep A antibody negative. Hep b screen negative. Hep C screen negative. UA without infectious concerns. Blood cultures + GPC resembling strep in 1/4 bottle, BCID + strep sp. Repeat blood cultures pending. Chest xray negative for acute findings. CT head negative for acute findings. CT AP noting partial obstruction in esophagus, cirrhotic liver morphology, splenomegaly, trace ascites, colitis of ascending colon, gastritis, mild anasarca, and small right pleural effusion. Liver ultrasound noting elevated intraparenchymal resistive indices, noting rejection fluid overload vs chronic liver disease.     IR following and attempted paracentesis, but noting there wasn't a safe window of fluid and therefore procedure was cancelled. Pt is s/p endoscopy today, noting 9 cm mucosal tear which was biopsied, noting esophageal stenosis near EGJ with esophagitis, pyloric stenosis. Gen surgery following and planning EGD on 12/23, cf severe esophageal inflammation or esophageal stricture, and noting significant distension of lower esophagus with fluid and debris, as well as hyperdense structure at GEJ.     Pt is currently afebrile, HDS. On vanc and ceftriaxone.      GI and hepatology are following. ID was consulted for antibiotic recs.   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    Arnold-Chiari malformation     Depression     Esophageal stricture     Essential tremor     Hypertension     Left bundle branch block     Liver fibrosis, transplanted liver 10/2/2018    Suggested on fibroscan 10/2/18    Migraine without aura     MVP (mitral valve prolapse)     Non-rheumatic mitral regurgitation 10/1/2018    Non-rheumatic tricuspid valve insufficiency 10/1/2018    Osteoporosis     Recurrent urinary tract infection     Seizures     Shingles 2007    SIADH (syndrome of inappropriate ADH production)     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    Tricuspid valve prolapse     Urolithiasis     Von Gierke disease     s/p liver transplant       Past Surgical History:   Procedure Laterality Date    APPENDECTOMY  6/22/2007    APPLICATION OF WOUND VACUUM-ASSISTED CLOSURE DEVICE N/A 9/18/2020    Procedure: APPLICATION, WOUND VAC;  Surgeon: Zain Decker MD;  Location: [facility] OR [address] [place] [address];  Service: General;  Laterality: N/A;    COLONOSCOPY  5/13/2008    internal hemorrhoids    CRANIOTOMY      ESOPHAGOGASTRODUODENOSCOPY N/A 9/3/2020    Procedure: EGD (ESOPHAGOGASTRODUODENOSCOPY);  Surgeon: Tyrel Vergara MD;  Location: [facility];  Service: Endoscopy;  Laterality: N/A;    EXPLORATORY LAPAROTOMY  2020    due to perforated stomach    LAMINECTOMY  3/2001    LIVER TRANSPLANT  9/23/2002    OSSICULAR RECONSTRUCTION  10/4/1995    RIGHT REPLACEMENT PROSTHESIS for cholesteatoma    THYMECTOMY  5/2/2007    TONSILLECTOMY, ADENOIDECTOMY  1/21/2004    TOTAL ABDOMINAL HYSTERECTOMY  3/31/1994       Review of patient's allergies indicates:   Allergen Reactions    Codeine Itching     Other reaction(s): Itching    Lipitor [atorvastatin] Other (See Comments)     Other reaction(s): Muscle pain  Muscle cranmps    Morphine Itching     Other reaction(s): nausea and vomiting      Zoloft [sertraline] Other (See Comments)     Tremors/muscle spasms       Medications:  Facility-Administered Medications Prior to Admission   Medication    onabotulinumtoxina injection 200 Units     Medications Prior to Admission   Medication Sig    acetaminophen (TYLENOL) 325 MG tablet Take 2 tablets (650 mg total) by mouth every 6 (six) hours as needed.    butalbitaL-acetaminophen  mg Tab TAKE 1 TABLET BY MOUTH TWICE DAILY AS NEEDED FOR HEADACHE    calcium carbonate (TUMS) 200 mg calcium (500 mg) chewable tablet Take 1 tablet (500 mg total) by mouth 2 (two) times daily as needed. (Patient taking differently: Take 1,500 mg by mouth daily as needed for Heartburn.)    calcium phosphate trib/vit D3 (CALTRATE GUMMY BITES ORAL) Take by mouth.    clonazePAM (KLONOPIN) 0.5 MG tablet Take 1 tablet (0.5 mg total) by mouth 2 (two) times daily.    DENAVIR 1 % cream RICHAR EXT AA Q 2 H FOR 4 DAYS    fluorometholone 0.1% (FML) 0.1 % DrpS Place 2 drops into the left eye 2 (two) times daily.    furosemide (LASIX) 20 MG tablet     hydrocortisone 2.5 % cream Apply topically to affected area twice daily as needed    lactulose (CHRONULAC) 10 gram/15 mL solution Take 15 mLs (10 g total) by mouth 3 (three) times daily. (Patient taking differently: Take 10 g by mouth 3 (three) times daily. As needed)    levothyroxine (SYNTHROID) 75 MCG tablet Take 1 tablet (75 mcg total) by mouth once daily.    magnesium oxide (MAG-OX) 400 mg (241.3 mg magnesium) tablet TAKE 1 TABLET(400 MG) BY MOUTH TWICE DAILY    multivitamin capsule Take 1 capsule by mouth once daily.    multivitamin capsule Take 1 capsule by mouth once daily.    ondansetron (ZOFRAN-ODT) 4 MG TbDL DISSOLVE 1 TABLET(4 MG) ON THE TONGUE EVERY 6 HOURS AS NEEDED    polyethylene glycol (GLYCOLAX) 17 gram/dose powder MIX 17 GRAMS (1 capful) IN LIQUID AND DRINK BY MOUTH TWICE DAILY (Patient taking differently: Take 17 g by mouth 2 (two) times daily. As needed)    pravastatin (PRAVACHOL)  20 MG tablet Take 1 tablet (20 mg total) by mouth once daily.    prochlorperazine (COMPAZINE) 10 MG tablet Take 1 tablet (10 mg total) by mouth every 6 (six) hours as needed (migraine or nausea).    promethazine (PHENERGAN) 25 MG tablet TAKE 1 TABLET(25 MG) BY MOUTH EVERY 6 HOURS AS NEEDED FOR NAUSEA    ramipriL (ALTACE) 5 MG capsule Take 1 capsule (5 mg total) by mouth once daily.    rifAXIMin (XIFAXAN) 550 mg Tab Take 1 tablet (550 mg total) by mouth 2 (two) times daily.    SHINGRIX, PF, 50 mcg/0.5 mL injection     spironolactone (ALDACTONE) 50 MG tablet     tacrolimus (PROGRAF) 0.5 MG Cap Take 1 capsule (0.5 mg total) by mouth every 12 (twelve) hours.    ubrogepant (UBRELVY) 50 mg tablet Take 1 tablet po at onset of migraine. May repeat in 2 hours if needed. Max 2 tablets per day.    valACYclovir (VALTREX) 1000 MG tablet TAKE 2 TABLETS(2000 MG) BY MOUTH TWICE DAILY FOR 2 DOSES (Patient taking differently: TAKE 2 TABLETS(2000 MG) BY MOUTH TWICE DAILY FOR 2 DOSES as needed)    venlafaxine (EFFEXOR-XR) 150 MG Cp24 Take 1 capsule (150 mg total) by mouth once daily.     Antibiotics (From admission, onward)      Start     Stop Route Frequency Ordered    12/22/23 1100  cefTRIAXone (ROCEPHIN) 2 g in dextrose 5 % in water (D5W) 100 mL IVPB (MB+)         -- IV Every 24 hours (non-standard times) 12/21/23 1518    12/22/23 1020  vancomycin - pharmacy to dose  (vancomycin IVPB (PEDS and ADULTS))        See Hyperspace for full Linked Orders Report.    -- IV pharmacy to manage frequency 12/22/23 0920    12/21/23 2100  rifAXIMin tablet 550 mg         -- Oral 2 times daily 12/21/23 1517          Antifungals (From admission, onward)      None          Antivirals (From admission, onward)      None             Immunization History   Administered Date(s) Administered    COVID-19, MRNA, LN-S, PF (Pfizer) (Gray Cap) 05/24/2022    COVID-19, MRNA, LN-S, PF (Pfizer) (Purple Cap) 03/27/2021, 04/17/2021, 08/23/2021    Influenza 11/14/2015,  10/19/2021    Influenza - Quadrivalent 11/11/2014    Influenza - Quadrivalent - MDCK - PF 11/18/2017, 11/12/2022    Influenza - Quadrivalent - PF *Preferred* (6 months and older) 09/22/2018, 10/20/2019, 12/02/2020    Influenza Split 10/20/2007, 10/28/2008, 10/27/2010, 08/28/2011    PPD Test 09/14/2020    Pneumococcal Conjugate - 13 Valent 10/20/2019    Pneumococcal Polysaccharide - 23 Valent 10/28/2008, 02/14/2023    Zoster Recombinant 05/24/2022, 11/12/2022       Family History       Problem Relation (Age of Onset)    Heart disease Mother    No Known Problems Father    Stroke Mother          Social History     Socioeconomic History    Marital status:    Tobacco Use    Smoking status: Never    Smokeless tobacco: Never   Substance and Sexual Activity    Alcohol use: No    Drug use: No     Social Determinants of Health     Transportation Needs: Unmet Transportation Needs (1/5/2021)    PRAPARE - Transportation     Lack of Transportation (Medical): Yes     Lack of Transportation (Non-Medical): Yes   Physical Activity: Insufficiently Active (1/5/2021)    Exercise Vital Sign     Days of Exercise per Week: 3 days     Minutes of Exercise per Session: 30 min     Review of Systems   Constitutional:  Negative for chills, diaphoresis, fatigue and fever.   HENT:  Positive for sore throat.    Respiratory:  Negative for cough and shortness of breath.    Cardiovascular:  Negative for leg swelling.   Gastrointestinal:  Positive for diarrhea, nausea and vomiting. Negative for constipation.   Musculoskeletal:  Negative for arthralgias and myalgias.   Skin:  Negative for rash and wound.   Neurological:  Positive for weakness. Negative for syncope.   Psychiatric/Behavioral:  Negative for agitation and confusion.      Objective:     Vital Signs (Most Recent):  Temp: 99 °F (37.2 °C) (12/22/23 1210)  Pulse: 76 (12/22/23 1353)  Resp: 18 (12/22/23 1353)  BP: 137/64 (12/22/23 1210)  SpO2: 96 % (12/22/23 1353) Vital Signs (24h  Range):  Temp:  [97.9 °F (36.6 °C)-99 °F (37.2 °C)] 99 °F (37.2 °C)  Pulse:  [68-84] 76  Resp:  [15-20] 18  SpO2:  [94 %-98 %] 96 %  BP: (105-156)/(52-64) 137/64     Weight: 50.8 kg (112 lb)  Body mass index is 22.61 kg/m².    Estimated Creatinine Clearance: 32.6 mL/min (A) (based on SCr of 1.5 mg/dL (H)).     Physical Exam  Vitals reviewed.   Constitutional:       General: She is not in acute distress.     Appearance: Normal appearance. She is not ill-appearing.   HENT:      Head: Normocephalic.      Nose: Nose normal.      Mouth/Throat:      Mouth: Mucous membranes are moist.      Pharynx: Oropharynx is clear.   Eyes:      General:         Right eye: No discharge.         Left eye: No discharge.      Conjunctiva/sclera: Conjunctivae normal.   Cardiovascular:      Rate and Rhythm: Normal rate and regular rhythm.      Pulses: Normal pulses.      Heart sounds: Normal heart sounds. No murmur heard.  Pulmonary:      Effort: Pulmonary effort is normal. No respiratory distress.      Breath sounds: Normal breath sounds. No stridor.   Abdominal:      General: Abdomen is flat. There is no distension.      Palpations: Abdomen is soft.      Tenderness: There is no abdominal tenderness.   Musculoskeletal:         General: Normal range of motion.      Cervical back: Normal range of motion.      Right lower leg: No edema.      Left lower leg: No edema.   Skin:     General: Skin is warm.      Findings: No erythema or rash.   Neurological:      General: No focal deficit present.      Mental Status: She is alert and oriented to person, place, and time.      Motor: No weakness.      Gait: Gait normal.   Psychiatric:         Mood and Affect: Mood normal.         Behavior: Behavior normal.         Thought Content: Thought content normal.         Judgment: Judgment normal.          Significant Labs: All pertinent labs within the past 24 hours have been reviewed.    Significant Imaging: I have reviewed all pertinent imaging  results/findings within the past 24 hours.

## 2023-12-22 NOTE — PLAN OF CARE
Per KATHLEEN Yeager, NP no fluid seen via ultrasound to safely proceed with paracentesis. Report called to PEDRO LUIS Hansen. Pt awaiting transport to Endo.

## 2023-12-22 NOTE — HPI
Elda Hernandez is a 53 y.o. 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 Pt reports N/V with occasional bilious emesis. She reports being on liquid diet since discharge from her last hospital stay in 03/2023. Pt denies any sick contacts. She reports chills but no fevers and is on rejection medications for her transplant. She attempted Zofran at home without change to her N/V symptoms. Pt reports a fainting episode earlier this morning and isn't sure if she hit her head but reports headache.      Feels like food and pills get stuck.   Abd pain, N/V. Denies fever or dysuria.

## 2023-12-22 NOTE — HOSPITAL COURSE
Admitted for N/V abd pain. CT with abnormality at esophageal junction concern for obstruction, GI consulted. EGD showed ulceration with concern for perforation. Recommended CT with oral contrast which showed outpouching concerning for leak. CTS surgery consulted. Esophagram was negative for leak/perforation. Clears restarted and emesis improved.    Not enough fluid for para, cont CTX. Bcx + Strep, vanc started and ID consulted. Hepatology following given history of liver transplant.   Diuretics held for JACQUE and NPO, resumed JACQUE resolved.   Patient tolerating liquid diet, cont at discharge. Cont PPI and carafate 8 weeks with repeat EGD to assess ulcer after discharge, GI referral placed. Path pending at discharge.   Bacteremia +Strep STREPTOCOCCUS MITIS/ORALIS sensitive to levaquin, discharged to complete levaquin course. Repeat Bcx NGTD.   Stable for discharge with PCP, hepatology, GI followup.

## 2023-12-22 NOTE — SUBJECTIVE & OBJECTIVE
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    Arnold-Chiari malformation     Depression     Esophageal stricture     Essential tremor     Hypertension     Left bundle branch block     Liver fibrosis, transplanted liver 10/2/2018    Suggested on fibroscan 10/2/18    Migraine without aura     MVP (mitral valve prolapse)     Non-rheumatic mitral regurgitation 10/1/2018    Non-rheumatic tricuspid valve insufficiency 10/1/2018    Osteoporosis     Recurrent urinary tract infection     Seizures     Shingles 2007    SIADH (syndrome of inappropriate ADH production)     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    Tricuspid valve prolapse     Urolithiasis     Von Gierke disease     s/p liver transplant       Past Surgical History:   Procedure Laterality Date    APPENDECTOMY  6/22/2007    APPLICATION OF WOUND VACUUM-ASSISTED CLOSURE DEVICE N/A 9/18/2020    Procedure: APPLICATION, WOUND VAC;  Surgeon: Zain Decker MD;  Location: [address];  Service: General;  Laterality: N/A;    COLONOSCOPY  5/13/2008    internal hemorrhoids    CRANIOTOMY      ESOPHAGOGASTRODUODENOSCOPY N/A 9/3/2020    Procedure: EGD (ESOPHAGOGASTRODUODENOSCOPY);  Surgeon: Tyrel Vergara MD;  Location: [facility];  Service: Endoscopy;  Laterality: N/A;    EXPLORATORY LAPAROTOMY  2020    due to perforated stomach    LAMINECTOMY  3/2001    LIVER TRANSPLANT  9/23/2002    OSSICULAR RECONSTRUCTION  10/4/1995    RIGHT REPLACEMENT PROSTHESIS for cholesteatoma    THYMECTOMY  5/2/2007    TONSILLECTOMY, ADENOIDECTOMY  1/21/2004    TOTAL ABDOMINAL HYSTERECTOMY  3/31/1994       Review of patient's allergies indicates:   Allergen Reactions    Codeine Itching     Other reaction(s): Itching    Lipitor [atorvastatin] Other (See Comments)     Other reaction(s): Muscle pain  Muscle cranmps    Morphine Itching     Other reaction(s): nausea and vomiting     Zoloft [sertraline] Other (See Comments)     Tremors/muscle spasms       No  current facility-administered medications on file prior to encounter.     Current Outpatient Medications on File Prior to Encounter   Medication Sig    acetaminophen (TYLENOL) 325 MG tablet Take 2 tablets (650 mg total) by mouth every 6 (six) hours as needed.    butalbitaL-acetaminophen  mg Tab TAKE 1 TABLET BY MOUTH TWICE DAILY AS NEEDED FOR HEADACHE    calcium carbonate (TUMS) 200 mg calcium (500 mg) chewable tablet Take 1 tablet (500 mg total) by mouth 2 (two) times daily as needed. (Patient taking differently: Take 1,500 mg by mouth daily as needed for Heartburn.)    calcium phosphate trib/vit D3 (CALTRATE GUMMY BITES ORAL) Take by mouth.    clonazePAM (KLONOPIN) 0.5 MG tablet Take 1 tablet (0.5 mg total) by mouth 2 (two) times daily.    DENAVIR 1 % cream RICHAR EXT AA Q 2 H FOR 4 DAYS    fluorometholone 0.1% (FML) 0.1 % DrpS Place 2 drops into the left eye 2 (two) times daily.    furosemide (LASIX) 20 MG tablet     hydrocortisone 2.5 % cream Apply topically to affected area twice daily as needed    lactulose (CHRONULAC) 10 gram/15 mL solution Take 15 mLs (10 g total) by mouth 3 (three) times daily. (Patient taking differently: Take 10 g by mouth 3 (three) times daily. As needed)    levothyroxine (SYNTHROID) 75 MCG tablet Take 1 tablet (75 mcg total) by mouth once daily.    magnesium oxide (MAG-OX) 400 mg (241.3 mg magnesium) tablet TAKE 1 TABLET(400 MG) BY MOUTH TWICE DAILY    multivitamin capsule Take 1 capsule by mouth once daily.    multivitamin capsule Take 1 capsule by mouth once daily.    ondansetron (ZOFRAN-ODT) 4 MG TbDL DISSOLVE 1 TABLET(4 MG) ON THE TONGUE EVERY 6 HOURS AS NEEDED    polyethylene glycol (GLYCOLAX) 17 gram/dose powder MIX 17 GRAMS (1 capful) IN LIQUID AND DRINK BY MOUTH TWICE DAILY (Patient taking differently: Take 17 g by mouth 2 (two) times daily. As needed)    pravastatin (PRAVACHOL) 20 MG tablet Take 1 tablet (20 mg total) by mouth once daily.    prochlorperazine (COMPAZINE) 10  MG tablet Take 1 tablet (10 mg total) by mouth every 6 (six) hours as needed (migraine or nausea).    promethazine (PHENERGAN) 25 MG tablet TAKE 1 TABLET(25 MG) BY MOUTH EVERY 6 HOURS AS NEEDED FOR NAUSEA    ramipriL (ALTACE) 5 MG capsule Take 1 capsule (5 mg total) by mouth once daily.    rifAXIMin (XIFAXAN) 550 mg Tab Take 1 tablet (550 mg total) by mouth 2 (two) times daily.    SHINGRIX, PF, 50 mcg/0.5 mL injection     spironolactone (ALDACTONE) 50 MG tablet     tacrolimus (PROGRAF) 0.5 MG Cap Take 1 capsule (0.5 mg total) by mouth every 12 (twelve) hours.    ubrogepant (UBRELVY) 50 mg tablet Take 1 tablet po at onset of migraine. May repeat in 2 hours if needed. Max 2 tablets per day.    valACYclovir (VALTREX) 1000 MG tablet TAKE 2 TABLETS(2000 MG) BY MOUTH TWICE DAILY FOR 2 DOSES (Patient taking differently: TAKE 2 TABLETS(2000 MG) BY MOUTH TWICE DAILY FOR 2 DOSES as needed)    venlafaxine (EFFEXOR-XR) 150 MG Cp24 Take 1 capsule (150 mg total) by mouth once daily.     Family History       Problem Relation (Age of Onset)    Heart disease Mother    No Known Problems Father    Stroke Mother          Tobacco Use    Smoking status: Never    Smokeless tobacco: Never   Substance and Sexual Activity    Alcohol use: No    Drug use: No    Sexual activity: Not on file     Review of Systems   Constitutional:  Positive for activity change, appetite change and chills. Negative for fever.   HENT:  Positive for trouble swallowing.    Respiratory:  Negative for cough and shortness of breath.    Cardiovascular:  Negative for leg swelling.   Gastrointestinal:  Positive for abdominal distention, abdominal pain, diarrhea, nausea and vomiting.   Genitourinary:  Negative for decreased urine volume and dysuria.   Musculoskeletal:  Negative for myalgias.   Skin:  Negative for rash.   Neurological:  Positive for syncope, weakness and headaches.   Psychiatric/Behavioral:  Negative for confusion.      Objective:     Vital Signs (Most  Recent):  Temp: 98.4 °F (36.9 °C) (12/21/23 0649)  Pulse: 77 (12/21/23 1504)  Resp: 16 (12/21/23 1626)  BP: (!) 144/64 (12/21/23 1502)  SpO2: 96 % (12/21/23 1504) Vital Signs (24h Range):  Temp:  [98.4 °F (36.9 °C)] 98.4 °F (36.9 °C)  Pulse:  [69-85] 77  Resp:  [12-24] 16  SpO2:  [95 %-99 %] 96 %  BP: ()/(44-79) 144/64     Weight: 50.8 kg (112 lb)  Body mass index is 22.62 kg/m².     Physical Exam  Vitals and nursing note reviewed.   Constitutional:       General: She is not in acute distress.     Appearance: Normal appearance. She is ill-appearing.   HENT:      Head: Normocephalic and atraumatic.      Right Ear: External ear normal.      Left Ear: External ear normal.      Nose: Nose normal.      Mouth/Throat:      Mouth: Mucous membranes are moist.      Pharynx: Oropharynx is clear.   Eyes:      General: Scleral icterus present.      Extraocular Movements: Extraocular movements intact.      Conjunctiva/sclera: Conjunctivae normal.      Pupils: Pupils are equal, round, and reactive to light.   Cardiovascular:      Rate and Rhythm: Normal rate and regular rhythm.      Pulses: Normal pulses.      Heart sounds: Murmur heard.   Pulmonary:      Effort: Pulmonary effort is normal.      Breath sounds: Normal breath sounds.   Abdominal:      General: Bowel sounds are normal. There is distension.      Palpations: Abdomen is soft.      Tenderness: There is abdominal tenderness.   Musculoskeletal:         General: Normal range of motion.      Cervical back: Normal range of motion and neck supple.      Right lower leg: No edema.      Left lower leg: No edema.   Skin:     General: Skin is warm and dry.      Coloration: Skin is jaundiced.   Neurological:      General: No focal deficit present.      Mental Status: She is alert and oriented to person, place, and time.   Psychiatric:         Mood and Affect: Mood normal.         Behavior: Behavior normal.              CRANIAL NERVES     CN III, IV, VI   Pupils are equal,  round, and reactive to light.       Significant Labs: All pertinent labs within the past 24 hours have been reviewed.    Significant Imaging: I have reviewed all pertinent imaging results/findings within the past 24 hours.

## 2023-12-22 NOTE — TREATMENT PLAN
GI Post Procedure Treatment Plan  Procedure Performed: EGD    Impression:    - Suspected deep, 9 cm long, linear mucosal tear                          with ulceration in the middle and lower third of                          the esophagus. Biopsied.                          - Benign-appearing esophageal stenosis at the EGJ                          with esophagitis. Traversed with  scope.                          - Closed previous gastrostomy present                          characterized by healthy appearing mucosa.                          - Post-surgical deformity in the gastric fundus,                          possibly from prior gastric perforation with                          repair.                          - Pyloric stenosis.                          - Normal examined duodenum.     Recommendation:                                - Return patient to [facility] for [facility] care.                          - Perform a CT scan of chest/abdomen with oral                          contrast.                          - NPO                          - Use a proton pump inhibitor IV BID.                          - Await pathology results.     Maegan Aleman MD  Gastroenterology, PGY-4

## 2023-12-22 NOTE — PLAN OF CARE
Pt arrived to [facility] room 3 for PARA, no acute distress noted. Orders and labs reviewed on chart. Awaiting consent.

## 2023-12-22 NOTE — PROVATION PATIENT INSTRUCTIONS
Discharge Summary/Instructions after an Endoscopic Procedure  Patient Name: Elda Hernandez  Patient MRN: [id number]  Patient YOB: 1970  Friday, December 22, 2023  Jhony James MD  Dear patient,  As a result of recent federal legislation (The Federal Cures Act), you may   receive lab or pathology results from your procedure in your [facility]   account before your physician is able to contact you. Your physician or   their representative will relay the results to you with their   recommendations at their soonest availability.  Thank you,  RESTRICTIONS:  During your procedure today, you received medications for sedation.  These   medications may affect your judgment, balance and coordination.  Therefore,   for 24 hours, you have the following restrictions:   - DO NOT drive a car, operate machinery, make legal/financial decisions,   sign important papers or drink alcohol.    ACTIVITY:  Today: no heavy lifting, straining or running due to procedural   sedation/anesthesia.  The following day: return to full activity including work.  DIET:  Eat and drink normally unless instructed otherwise.     TREATMENT FOR COMMON SIDE EFFECTS:  - Mild abdominal pain, nausea, belching, bloating or excessive gas:  rest,   eat lightly and use a heating pad.  - Sore Throat: treat with throat lozenges and/or gargle with warm salt   water.  - Because air was used during the procedure, expelling large amounts of air   from your rectum or belching is normal.  - If a bowel prep was taken, you may not have a bowel movement for 1-3 days.    This is normal.  SYMPTOMS TO WATCH FOR AND REPORT TO YOUR PHYSICIAN:  1. Abdominal pain or bloating, other than gas cramps.  2. Chest pain.  3. Back pain.  4. Signs of infection such as: chills or fever occurring within 24 hours   after the procedure.  5. Rectal bleeding, which would show as bright red, maroon, or black stools.   (A tablespoon of blood from the rectum is not serious, especially  if   hemorrhoids are present.)  6. Vomiting.  7. Weakness or dizziness.  GO DIRECTLY TO THE NEAREST EMERGENCY ROOM IF YOU HAVE ANY OF THE FOLLOWING:      Difficulty breathing              Chills and/or fever over 101 F   Persistent vomiting and/or vomiting blood   Severe abdominal pain   Severe chest pain   Black, tarry stools   Bleeding- more than one tablespoon   Any other symptom or condition that you feel may need urgent attention  Your doctor recommends these additional instructions:  If any biopsies were taken, your doctors clinic will contact you in 1 to 2   weeks with any results.  - Return patient to [facility] for ongoing care.   - Perform a CT scan of chest/abdomen with oral contrast.   - NPO  - Use a proton pump inhibitor IV BID.   - Await pathology results.   For questions, problems or results please call your physician - Jhony James MD at Work:  [phone].  [facility], EMERGENCY ROOM PHONE NUMBER: [phone]  IF A COMPLICATION OR EMERGENCY SITUATION ARISES AND YOU ARE UNABLE TO REACH   YOUR PHYSICIAN - GO DIRECTLY TO THE EMERGENCY ROOM.  Jhony James MD  12/22/2023 11:51:16 AM  This report has been verified and signed electronically.  Dear patient,  As a result of recent federal legislation (The Federal Cures Act), you may   receive lab or pathology results from your procedure in your [facility]   account before your physician is able to contact you. Your physician or   their representative will relay the results to you with their   recommendations at their soonest availability.  Thank you,  PROVATION
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
## 2023-12-22 NOTE — SUBJECTIVE & OBJECTIVE
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    Arnold-Chiari malformation     Depression     Esophageal stricture     Essential tremor     Hypertension     Left bundle branch block     Liver fibrosis, transplanted liver 10/2/2018    Suggested on fibroscan 10/2/18    Migraine without aura     MVP (mitral valve prolapse)     Non-rheumatic mitral regurgitation 10/1/2018    Non-rheumatic tricuspid valve insufficiency 10/1/2018    Osteoporosis     Recurrent urinary tract infection     Seizures     Shingles 2007    SIADH (syndrome of inappropriate ADH production)     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    Tricuspid valve prolapse     Urolithiasis     Von Gierke disease     s/p liver transplant       Past Surgical History:   Procedure Laterality Date    APPENDECTOMY  6/22/2007    APPLICATION OF WOUND VACUUM-ASSISTED CLOSURE DEVICE N/A 9/18/2020    Procedure: APPLICATION, WOUND VAC;  Surgeon: Zain Decker MD;  Location: [address];  Service: General;  Laterality: N/A;    COLONOSCOPY  5/13/2008    internal hemorrhoids    CRANIOTOMY      ESOPHAGOGASTRODUODENOSCOPY N/A 9/3/2020    Procedure: EGD (ESOPHAGOGASTRODUODENOSCOPY);  Surgeon: Tyrel Vergara MD;  Location: [facility];  Service: Endoscopy;  Laterality: N/A;    EXPLORATORY LAPAROTOMY  2020    due to perforated stomach    LAMINECTOMY  3/2001    LIVER TRANSPLANT  9/23/2002    OSSICULAR RECONSTRUCTION  10/4/1995    RIGHT REPLACEMENT PROSTHESIS for cholesteatoma    THYMECTOMY  5/2/2007    TONSILLECTOMY, ADENOIDECTOMY  1/21/2004    TOTAL ABDOMINAL HYSTERECTOMY  3/31/1994       Review of patient's allergies indicates:   Allergen Reactions    Codeine Itching     Other reaction(s): Itching    Lipitor [atorvastatin] Other (See Comments)     Other reaction(s): Muscle pain  Muscle cranmps    Morphine Itching     Other reaction(s): nausea and vomiting     Zoloft [sertraline] Other (See Comments)     Tremors/muscle spasms        Medications:  Facility-Administered Medications Prior to Admission   Medication    onabotulinumtoxina injection 200 Units     Medications Prior to Admission   Medication Sig    acetaminophen (TYLENOL) 325 MG tablet Take 2 tablets (650 mg total) by mouth every 6 (six) hours as needed.    butalbitaL-acetaminophen  mg Tab TAKE 1 TABLET BY MOUTH TWICE DAILY AS NEEDED FOR HEADACHE    calcium carbonate (TUMS) 200 mg calcium (500 mg) chewable tablet Take 1 tablet (500 mg total) by mouth 2 (two) times daily as needed. (Patient taking differently: Take 1,500 mg by mouth daily as needed for Heartburn.)    calcium phosphate trib/vit D3 (CALTRATE GUMMY BITES ORAL) Take by mouth.    clonazePAM (KLONOPIN) 0.5 MG tablet Take 1 tablet (0.5 mg total) by mouth 2 (two) times daily.    DENAVIR 1 % cream RICHAR EXT AA Q 2 H FOR 4 DAYS    fluorometholone 0.1% (FML) 0.1 % DrpS Place 2 drops into the left eye 2 (two) times daily.    furosemide (LASIX) 20 MG tablet     hydrocortisone 2.5 % cream Apply topically to affected area twice daily as needed    lactulose (CHRONULAC) 10 gram/15 mL solution Take 15 mLs (10 g total) by mouth 3 (three) times daily. (Patient taking differently: Take 10 g by mouth 3 (three) times daily. As needed)    levothyroxine (SYNTHROID) 75 MCG tablet Take 1 tablet (75 mcg total) by mouth once daily.    magnesium oxide (MAG-OX) 400 mg (241.3 mg magnesium) tablet TAKE 1 TABLET(400 MG) BY MOUTH TWICE DAILY    multivitamin capsule Take 1 capsule by mouth once daily.    multivitamin capsule Take 1 capsule by mouth once daily.    ondansetron (ZOFRAN-ODT) 4 MG TbDL DISSOLVE 1 TABLET(4 MG) ON THE TONGUE EVERY 6 HOURS AS NEEDED    polyethylene glycol (GLYCOLAX) 17 gram/dose powder MIX 17 GRAMS (1 capful) IN LIQUID AND DRINK BY MOUTH TWICE DAILY (Patient taking differently: Take 17 g by mouth 2 (two) times daily. As needed)    pravastatin (PRAVACHOL) 20 MG tablet Take 1 tablet (20 mg total) by mouth once daily.     prochlorperazine (COMPAZINE) 10 MG tablet Take 1 tablet (10 mg total) by mouth every 6 (six) hours as needed (migraine or nausea).    promethazine (PHENERGAN) 25 MG tablet TAKE 1 TABLET(25 MG) BY MOUTH EVERY 6 HOURS AS NEEDED FOR NAUSEA    ramipriL (ALTACE) 5 MG capsule Take 1 capsule (5 mg total) by mouth once daily.    rifAXIMin (XIFAXAN) 550 mg Tab Take 1 tablet (550 mg total) by mouth 2 (two) times daily.    SHINGRIX, PF, 50 mcg/0.5 mL injection     spironolactone (ALDACTONE) 50 MG tablet     tacrolimus (PROGRAF) 0.5 MG Cap Take 1 capsule (0.5 mg total) by mouth every 12 (twelve) hours.    ubrogepant (UBRELVY) 50 mg tablet Take 1 tablet po at onset of migraine. May repeat in 2 hours if needed. Max 2 tablets per day.    valACYclovir (VALTREX) 1000 MG tablet TAKE 2 TABLETS(2000 MG) BY MOUTH TWICE DAILY FOR 2 DOSES (Patient taking differently: TAKE 2 TABLETS(2000 MG) BY MOUTH TWICE DAILY FOR 2 DOSES as needed)    venlafaxine (EFFEXOR-XR) 150 MG Cp24 Take 1 capsule (150 mg total) by mouth once daily.     Antibiotics (From admission, onward)      Start     Stop Route Frequency Ordered    12/22/23 1100  cefTRIAXone (ROCEPHIN) 2 g in dextrose 5 % in water (D5W) 100 mL IVPB (MB+)         -- IV Every 24 hours (non-standard times) 12/21/23 1518    12/22/23 1020  vancomycin - pharmacy to dose  (vancomycin IVPB (PEDS and ADULTS))        See Hyperspace for full Linked Orders Report.    -- IV pharmacy to manage frequency 12/22/23 0920    12/21/23 2100  rifAXIMin tablet 550 mg         -- Oral 2 times daily 12/21/23 1517          Antifungals (From admission, onward)      None          Antivirals (From admission, onward)      None             Immunization History   Administered Date(s) Administered    COVID-19, MRNA, LN-S, PF (Pfizer) (Gray Cap) 05/24/2022    COVID-19, MRNA, LN-S, PF (Pfizer) (Purple Cap) 03/27/2021, 04/17/2021, 08/23/2021    Influenza 11/14/2015, 10/19/2021    Influenza - Quadrivalent 11/11/2014    Influenza -  Quadrivalent - MDCK - PF 11/18/2017, 11/12/2022    Influenza - Quadrivalent - PF *Preferred* (6 months and older) 09/22/2018, 10/20/2019, 12/02/2020    Influenza Split 10/20/2007, 10/28/2008, 10/27/2010, 08/28/2011    PPD Test 09/14/2020    Pneumococcal Conjugate - 13 Valent 10/20/2019    Pneumococcal Polysaccharide - 23 Valent 10/28/2008, 02/14/2023    Zoster Recombinant 05/24/2022, 11/12/2022       Family History       Problem Relation (Age of Onset)    Heart disease Mother    No Known Problems Father    Stroke Mother          Social History     Socioeconomic History    Marital status:    Tobacco Use    Smoking status: Never    Smokeless tobacco: Never   Substance and Sexual Activity    Alcohol use: No    Drug use: No     Social Determinants of Health     Transportation Needs: Unmet Transportation Needs (1/5/2021)    PRAPARE - Transportation     Lack of Transportation (Medical): Yes     Lack of Transportation (Non-Medical): Yes   Physical Activity: Insufficiently Active (1/5/2021)    Exercise Vital Sign     Days of Exercise per Week: 3 days     Minutes of Exercise per Session: 30 min     Review of Systems   Constitutional:  Negative for chills, diaphoresis, fatigue and fever.   HENT:  Positive for sore throat.    Respiratory:  Negative for cough and shortness of breath.    Cardiovascular:  Negative for leg swelling.   Gastrointestinal:  Positive for diarrhea, nausea and vomiting. Negative for constipation.   Musculoskeletal:  Negative for arthralgias and myalgias.   Skin:  Negative for rash and wound.   Neurological:  Positive for weakness. Negative for syncope.   Psychiatric/Behavioral:  Negative for agitation and confusion.      Objective:     Vital Signs (Most Recent):  Temp: 99 °F (37.2 °C) (12/22/23 1210)  Pulse: 76 (12/22/23 1353)  Resp: 18 (12/22/23 1353)  BP: 137/64 (12/22/23 1210)  SpO2: 96 % (12/22/23 1353) Vital Signs (24h Range):  Temp:  [97.9 °F (36.6 °C)-99 °F (37.2 °C)] 99 °F (37.2  °C)  Pulse:  [68-84] 76  Resp:  [15-20] 18  SpO2:  [94 %-98 %] 96 %  BP: (105-156)/(52-64) 137/64     Weight: 50.8 kg (112 lb)  Body mass index is 22.61 kg/m².    Estimated Creatinine Clearance: 32.6 mL/min (A) (based on SCr of 1.5 mg/dL (H)).     Physical Exam  Vitals reviewed.   Constitutional:       General: She is not in acute distress.     Appearance: Normal appearance. She is not ill-appearing.   HENT:      Head: Normocephalic.      Nose: Nose normal.      Mouth/Throat:      Mouth: Mucous membranes are moist.      Pharynx: Oropharynx is clear.   Eyes:      General:         Right eye: No discharge.         Left eye: No discharge.      Conjunctiva/sclera: Conjunctivae normal.   Cardiovascular:      Rate and Rhythm: Normal rate and regular rhythm.      Pulses: Normal pulses.      Heart sounds: Normal heart sounds. No murmur heard.  Pulmonary:      Effort: Pulmonary effort is normal. No respiratory distress.      Breath sounds: Normal breath sounds. No stridor.   Abdominal:      General: Abdomen is flat. There is no distension.      Palpations: Abdomen is soft.      Tenderness: There is no abdominal tenderness.   Musculoskeletal:         General: Normal range of motion.      Cervical back: Normal range of motion.      Right lower leg: No edema.      Left lower leg: No edema.   Skin:     General: Skin is warm.      Findings: No erythema or rash.   Neurological:      General: No focal deficit present.      Mental Status: She is alert and oriented to person, place, and time.      Motor: No weakness.      Gait: Gait normal.   Psychiatric:         Mood and Affect: Mood normal.         Behavior: Behavior normal.         Thought Content: Thought content normal.         Judgment: Judgment normal.          Significant Labs: All pertinent labs within the past 24 hours have been reviewed.    Significant Imaging: I have reviewed all pertinent imaging results/findings within the past 24 hours.

## 2023-12-22 NOTE — CONSULTS
[facility]  Hepatology  Consult Note    Patient Name: Elda Hernandez  MRN: [id number]  Admission Date: 12/21/2023  Hospital Length of Stay: 0 days  Code Status: Full Code   Attending Provider:  Dr. Patel  Consulting Provider: Pillo Maguire MD  Primary Care Physician: David Lorenzo MD  Principal Problem:Abdominal pain    Inpatient consult to Hepatology  Consult performed by: Pillo Maguire MD  Consult ordered by: Sangita Hayes MD        Subjective:     HPI: Elda Hernandez is a 53 y.o. female with history of  Von Gierke disease (status post liver transplant in 2002 with post-transplant course associated with chronic rejection and recurrent cirrhosis of graft), erosive esophagitis,  anxiety and stage III CKD who presented to the ER with nausea, vomiting, abdominal pain and diarrhea.    CT abdomen pelvis with contrast showed cirrhotic liver morphology, stigmata of portal hypertension, thickening of ascending colon wall, and significant distention of lower esophagus with fluid and debris.  Ultrasound liver with Doppler showed elevated intraparenchymal resistive indices, but liver allograft demonstrated homogeneous echotexture; no biliary dilation seen. Acute hepatitis panel negative.  Labs today are significant for AST 75, ALT 60, creatinine 1.5, INR 1.3. Gastroenterology was consulted for dysphagia and EGD was performed 12/22/2023 - 9 cm linear tear with ulceration found in middle and lower 3rd of esophagus, severe stenosis 35 cm from incisors, deformity in gastric fundus, and severe stenosis at pylorus was found.    She was seen by our inpatient service in February 2023 when she presented with encephalopathy and ongoing ataxia.  Mentation improved with lactulose.  Imaging at the time showed cerebellar infarcts.  She subsequently followed up with Dr. Nielsen in clinic.  Re transplantation was discussed but her MELD score at that time was not particularly high and it was felt that her  multiple abdominal surgeries would be a deterrent. On Prograf 0.5 mg BID.     Review of Systems   Constitutional:  Positive for appetite change, fatigue and unexpected weight change. Negative for activity change, chills, diaphoresis and fever.   Respiratory:  Negative for cough, choking, chest tightness and shortness of breath.    Cardiovascular:  Negative for chest pain, palpitations and leg swelling.   Gastrointestinal:  Positive for abdominal distention, abdominal pain, diarrhea, nausea and vomiting. Negative for anal bleeding, blood in stool, constipation and rectal pain.   Endocrine: Negative for cold intolerance, heat intolerance and polydipsia.   Genitourinary:  Negative for dysuria, flank pain, frequency and hematuria.   Neurological:  Positive for light-headedness. Negative for dizziness, speech difficulty, numbness and headaches.    Objective:     Vitals:    12/22/23 0757   BP: 127/62   Pulse: 78   Resp: 15   Temp:        Physical Exam  Constitutional:       Appearance: Normal appearance.   HENT:      Head: Normocephalic and atraumatic.   Cardiovascular:      Rate and Rhythm: Normal rate and regular rhythm.      Pulses: Normal pulses.      Heart sounds: Normal heart sounds.   Pulmonary:      Effort: Pulmonary effort is normal.      Breath sounds: Normal breath sounds.   Abdominal:      General: Abdomen is flat. There is distension.      Palpations: There is no mass.      Tenderness: There is no abdominal tenderness. There is right CVA tenderness. There is no left CVA tenderness, guarding or rebound.      Hernia: No hernia is present.   Musculoskeletal:         General: Normal range of motion.      Cervical back: Normal range of motion and neck supple.   Skin:     General: Skin is warm.   Neurological:      General: No focal deficit present.      Mental Status: She is alert and oriented to person, place, and time.      Cranial Nerves: No cranial nerve deficit.      Sensory: No sensory deficit.      Motor:  No weakness.      Coordination: Coordination normal.      Gait: Gait normal.      Deep Tendon Reflexes: Reflexes normal.     Significant Labs:  Recent Labs   Lab 12/21/23  0749 12/22/23  0516   HGB 11.2* 9.8*       Lab Results   Component Value Date    WBC 9.45 12/22/2023    HGB 9.8 (L) 12/22/2023    HCT 29.8 (L) 12/22/2023    MCV 94 12/22/2023    PLT 52 (L) 12/22/2023       Lab Results   Component Value Date     (L) 12/22/2023    K 4.8 12/22/2023     12/22/2023    CO2 19 (L) 12/22/2023    BUN 49 (H) 12/22/2023    CREATININE 1.5 (H) 12/22/2023    CALCIUM 8.6 (L) 12/22/2023    ANIONGAP 10 12/22/2023    ESTGFRAFRICA >60.0 05/28/2022    EGFRNONAA >60.0 05/28/2022       Lab Results   Component Value Date    ALT 60 (H) 12/22/2023    AST 75 (H) 12/22/2023     (H) 04/07/2018    ALKPHOS 487 (H) 12/22/2023    BILITOT 3.5 (H) 12/22/2023       Lab Results   Component Value Date    INR 1.3 (H) 12/22/2023    INR 1.1 12/21/2023    INR 1.2 12/12/2023       Significant Imaging:  Reviewed pertinent radiology findings.       Assessment/Plan:     Elda Hernandez is a 53 y.o. female with history of Von Gierke disease (status post liver transplant in 2002 with post-transplant course associated with chronic rejection and recurrent cirrhosis of graft), erosive esophagitis,  anxiety and stage III CKD who presented to the ER with nausea, vomiting, abdominal pain and diarrhea.  CT scan concerning for lower esophageal dilation and abnormality at GE junction. EGD was performed 12/22/2023 - 9 cm linear tear with ulceration found in middle and lower 3rd of esophagus, severe stenosis 35 cm from incisors, deformity in gastric fundus, and severe stenosis at pylorus was found.  LFTs indicate chronic rejection; managed on Prograf 0.5 mg b.i.d. Hepatic encephalopathy well-controlled with lactulose and Xifaxan. The possibility of re transplantation has been discussed; patient would be high risk given her multiple abdominal  surgeries.    Problem List:  S/P liver transplant 02 for von Gierke disease, now with cirrhosis in allograft  Chronic rejection   HE  Esophageal tear      MELD 3.0: 22 at 2023  5:16 AM  MELD-Na: 19 at 2023  5:16 AM  Calculated from:  Serum Creatinine: 1.5 mg/dL at 2023  5:16 AM  Serum Sodium: 135 mmol/L at 2023  5:16 AM  Total Bilirubin: 3.5 mg/dL at 2023  5:16 AM  Serum Albumin: 2.8 g/dL at 2023  5:16 AM  INR(ratio): 1.3 at 2023  5:16 AM  Age at listin years  Sex: Female at 2023  5:16 AM        Recommendations:  - Appreciate GI recs. Continue IV PPI BID. Await results of pathology & CT with PO contrast.    - Continue Prograf 0.5 mg BID. If vomiting, schedule anti-emetics.     - Appreciate ID recs with regards to management of bacteremia.     - Lactulose TID and Rifaximin BID; titrate dose frequency of Lactulose to 3 bowel movement daily.     - Minimize use of sedating agents that may precipitate encephalopathy (e.g. opioids and benzo's).     - Daily CBC, CMP, INR, AM Prograf level    Thank you for involving us in the care of Elda Hernandez. Please call with any additional questions, concerns or changes in the patient's clinical status. We will continue to follow.     Pillo Maguire MD  Gastroenterology Fellow PGY V  [facility]

## 2023-12-22 NOTE — PROGRESS NOTES
[facility] - Telemetry [facility] Medicine  Progress Note    Patient Name: Elda Hernandez  MRN: [id number]  Patient Class: OP- Observation   Admission Date: 12/21/2023  Length of Stay: 0 days  Attending Physician: Sangita Hayes MD  Primary Care Provider: David Lorenzo MD        Subjective:     Principal Problem:Abdominal pain        HPI:  Elda Hernandez is a 53 y.o. 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 Pt reports N/V with occasional bilious emesis. She reports being on liquid diet since discharge from her last hospital stay in 03/2023. Pt denies any sick contacts. She reports chills but no fevers and is on rejection medications for her transplant. She attempted Zofran at home without change to her N/V symptoms. Pt reports a fainting episode earlier this morning and isn't sure if she hit her head but reports headache.      Feels like food and pills get stuck.   Abd pain, N/V. Denies fever or dysuria.     Overview/Hospital Course:  Admitted for N/V abd pain. CT with abnormality at esophageal junction concern for obstruction, GI consulted. EGD showed ulceration with concern for perforation. Recommended CT with oral contrast which showed outpouching concerning for leak recommend esophagram, ordered.   Not enough fluid for para, cont CTX. Bcx + Strep, vanc started and ID consulted. Hepatology following given history of liver transplant.   Diuretics held for JACQUE and NPO.     Interval History: Patient reporting improvement in N/V, wants to eat.   EGD with ulceration concern for perforation recommended CT which was concerning for leak recommend esophagram, ordered STAT. Keep strict NPO until results.     Review of Systems   Constitutional:  Negative for fever.   Respiratory:  Negative for shortness of breath.    Gastrointestinal:  Positive for abdominal pain,  nausea and vomiting.   Psychiatric/Behavioral:  Negative for confusion.      Objective:     Vital Signs (Most Recent):  Temp: 98.5 °F (36.9 °C) (12/22/23 1531)  Pulse: 78 (12/22/23 1531)  Resp: 20 (12/22/23 1531)  BP: 137/64 (12/22/23 1210)  SpO2: (!) 92 % (12/22/23 1531) Vital Signs (24h Range):  Temp:  [97.9 °F (36.6 °C)-99 °F (37.2 °C)] 98.5 °F (36.9 °C)  Pulse:  [68-84] 78  Resp:  [15-20] 20  SpO2:  [92 %-98 %] 92 %  BP: (105-137)/(52-64) 137/64     Weight: 50.8 kg (112 lb)  Body mass index is 22.61 kg/m².    Intake/Output Summary (Last 24 hours) at 12/22/2023 1653  Last data filed at 12/22/2023 1018  Gross per 24 hour   Intake 950 ml   Output 700 ml   Net 250 ml         Physical Exam  Vitals and nursing note reviewed.   Constitutional:       General: She is not in acute distress.     Appearance: Normal appearance. She is not ill-appearing.   HENT:      Head: Normocephalic and atraumatic.      Right Ear: External ear normal.      Left Ear: External ear normal.      Nose: Nose normal.      Mouth/Throat:      Mouth: Mucous membranes are moist.      Pharynx: Oropharynx is clear.   Eyes:      General: Scleral icterus present.      Extraocular Movements: Extraocular movements intact.      Conjunctiva/sclera: Conjunctivae normal.      Pupils: Pupils are equal, round, and reactive to light.   Cardiovascular:      Rate and Rhythm: Normal rate and regular rhythm.      Pulses: Normal pulses.      Heart sounds: Murmur heard.   Pulmonary:      Effort: Pulmonary effort is normal.      Breath sounds: Normal breath sounds.   Abdominal:      General: Bowel sounds are normal. There is distension.      Palpations: Abdomen is soft.      Tenderness: There is abdominal tenderness.   Musculoskeletal:         General: Normal range of motion.      Cervical back: Normal range of motion and neck supple.      Right lower leg: No edema.      Left lower leg: No edema.   Skin:     General: Skin is warm and dry.      Coloration: Skin is  jaundiced.   Neurological:      General: No focal deficit present.      Mental Status: She is alert and oriented to person, place, and time.   Psychiatric:         Mood and Affect: Mood normal.         Behavior: Behavior normal.             Significant Labs: All pertinent labs within the past 24 hours have been reviewed.    Significant Imaging: I have reviewed all pertinent imaging results/findings within the past 24 hours.    Assessment/Plan:      * Abdominal pain  - para ordered concern for SBP given known ascites and liver disease- unable to do para, not enough fluid  - elevated procal and WBC from baseline, known immunosuppression   - started on CTX, vanc  - blood cultures + see bacteremia, UA noninfectious       Bacteremia  - Bcx +Strep, started vanc  - ID consulted  - repeat Bcx ordered      Esophageal dysphagia  - long standing history of dysphasia to solids, had been tolerating liquids  - now with N/V intolerance of all PO  - CT with Significant distension of the lower esophagus with fluid and debris, noting a prominent hyperdense structure at the gastroesophageal junction which could result in partial obstruction of the esophagus   - GI consulted and following  - EGD with ulceration and concern for perforation- CT with oral contrast showing Upper esophagus extraluminal air foci concerning for leak.  Additional more distal esophageal contrast outpouching which may represent esophageal ulcer however can also represent areas of contained leak.  Recommend further evaluation with water-soluble esophagram.   - esophogram ordered  - antiemetics scheduled, npo holding fluids given liver disease        Decompensated liver disease  - see chronic rejection of liver transplant   - hepatology following      JACQUE (acute kidney injury)  Patient with acute kidney injury/acute renal failure likely due to pre-renal azotemia due to dehydration JACQUE is currently worsening. Baseline creatinine  1  - Labs reviewed- Renal  function/electrolytes with Estimated Creatinine Clearance: 32.6 mL/min (A) (based on SCr of 1.5 mg/dL (H)). according to latest data. Monitor urine output and serial BMP and adjust therapy as needed. Avoid nephrotoxins and renally dose meds for GFR listed above.    - hold diuretics while NPO    Long-term use of immunosuppressant medication  - cont tacro with daily levels      Chronic rejection of liver transplant  Patient has chronic liver disease due to  Von Gierke disease . They have a history of ascites/volume overload and hepatic encephalopathy. Their liver disease is decompensated due to ascites/volume overload and portal hypertension. Hepatology has been consulted. The patient is not on the liver transplant list. We will obtain daily CBC, CMP, and INR. Their most current Na-MELD is listed below.  MELD 3.0: 22 at 2023  5:16 AM  MELD-Na: 19 at 2023  5:16 AM  Calculated from:  Serum Creatinine: 1.5 mg/dL at 2023  5:16 AM  Serum Sodium: 135 mmol/L at 2023  5:16 AM  Total Bilirubin: 3.5 mg/dL at 2023  5:16 AM  Serum Albumin: 2.8 g/dL at 2023  5:16 AM  INR(ratio): 1.3 at 2023  5:16 AM  Age at listin years  Sex: Female at 2023  5:16 AM      - ammonia elevated but at baseline, no encephalopathy, cont lactulose titrate and hold for diarrhea  - hepatology consulted and following  - CT: Cirrhotic liver morphology with stigmata of portal hypertension including splenomegaly, collateral vessels, and trace ascites.   - liver u/s: Elevated intraparenchymal resistive indices which may be seen with rejection fluid overload, or chronic liver disease.   - unable to do para, not enough fluid for safe para, cont CTX    Elevated liver enzymes  - elevated liver enzymes in liver transplant patient   - hepatology consulted      Hypothyroidism  - cont home synthroid      S/P liver transplant 02 for von Gierke disease  - noted, hepatology following  - cont tacro with daily  levels          VTE Risk Mitigation (From admission, onward)           Ordered     Place sequential compression device  Until discontinued         12/21/23 1517                    Discharge Planning   MARILEE: 12/28/2023     Code Status: Full Code   Is the patient medically ready for discharge?:     Reason for patient still in hospital (select all that apply): Patient trending condition and Consult recommendations                     Sangita Hayes MD  Department of Hospital Medicine   [facility] - Telemetry Stepdown

## 2023-12-22 NOTE — ASSESSMENT & PLAN NOTE
- long standing history of dysphasia to solids, had been tolerating liquids  - now with N/V intolerance of all PO  - CT with Significant distension of the lower esophagus with fluid and debris, noting a prominent hyperdense structure at the gastroesophageal junction which could result in partial obstruction of the esophagus   - GI consulted, recommend EGD tomorrow concern for possible stricture  - antiemetics PRN, npo holding fluids given liver disease

## 2023-12-22 NOTE — H&P
[facility] - Telemetry [facility] Medicine  History & Physical    Patient Name: Elda Hernandez  MRN: [id number]  Patient Class: OP- Observation  Admission Date: 12/21/2023  Attending Physician: Sangita Hayes MD   Primary Care Provider: David Lorenzo MD         Patient information was obtained from patient and ER records.     Subjective:     Principal Problem:Abdominal pain    Chief Complaint:   Chief Complaint   Patient presents with    Abdominal Pain     Pt complaining of abdominal pain and nausea since yesterday with diarrhea. Pt states end stage liver failure due to cirrhosis         HPI: Elda Hernandez is a 53 y.o. 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 Pt reports N/V with occasional bilious emesis. She reports being on liquid diet since discharge from her last hospital stay in 03/2023. Pt denies any sick contacts. She reports chills but no fevers and is on rejection medications for her transplant. She attempted Zofran at home without change to her N/V symptoms. Pt reports a fainting episode earlier this morning and isn't sure if she hit her head but reports headache.      Feels like food and pills get stuck.   Abd pain, N/V. Denies fever or dysuria.     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    Arnold-Chiari malformation     Depression     Esophageal stricture     Essential tremor     Hypertension     Left bundle branch block     Liver fibrosis, transplanted liver 10/2/2018    Suggested on fibroscan 10/2/18    Migraine without aura     MVP (mitral valve prolapse)     Non-rheumatic mitral regurgitation 10/1/2018    Non-rheumatic tricuspid valve insufficiency 10/1/2018    Osteoporosis     Recurrent urinary tract infection     Seizures     Shingles 2007    SIADH (syndrome of inappropriate ADH production)     Squamous  cell carcinoma 10/2014    vaginal    Tricuspid valve prolapse     Urolithiasis     Von Gierke disease     s/p liver transplant       Past Surgical History:   Procedure Laterality Date    APPENDECTOMY  6/22/2007    APPLICATION OF WOUND VACUUM-ASSISTED CLOSURE DEVICE N/A 9/18/2020    Procedure: APPLICATION, WOUND VAC;  Surgeon: Zain Decker MD;  Location: [facility] OR [address] [place] [address];  Service: General;  Laterality: N/A;    COLONOSCOPY  5/13/2008    internal hemorrhoids    CRANIOTOMY      ESOPHAGOGASTRODUODENOSCOPY N/A 9/3/2020    Procedure: EGD (ESOPHAGOGASTRODUODENOSCOPY);  Surgeon: Tyrel Vergara MD;  Location: [facility];  Service: Endoscopy;  Laterality: N/A;    EXPLORATORY LAPAROTOMY  2020    due to perforated stomach    LAMINECTOMY  3/2001    LIVER TRANSPLANT  9/23/2002    OSSICULAR RECONSTRUCTION  10/4/1995    RIGHT REPLACEMENT PROSTHESIS for cholesteatoma    THYMECTOMY  5/2/2007    TONSILLECTOMY, ADENOIDECTOMY  1/21/2004    TOTAL ABDOMINAL HYSTERECTOMY  3/31/1994       Review of patient's allergies indicates:   Allergen Reactions    Codeine Itching     Other reaction(s): Itching    Lipitor [atorvastatin] Other (See Comments)     Other reaction(s): Muscle pain  Muscle cranmps    Morphine Itching     Other reaction(s): nausea and vomiting     Zoloft [sertraline] Other (See Comments)     Tremors/muscle spasms       No current facility-administered medications on file prior to encounter.     Current Outpatient Medications on File Prior to Encounter   Medication Sig    acetaminophen (TYLENOL) 325 MG tablet Take 2 tablets (650 mg total) by mouth every 6 (six) hours as needed.    butalbitaL-acetaminophen  mg Tab TAKE 1 TABLET BY MOUTH TWICE DAILY AS NEEDED FOR HEADACHE    calcium carbonate (TUMS) 200 mg calcium (500 mg) chewable tablet Take 1 tablet (500 mg total) by mouth 2 (two) times daily as needed. (Patient taking differently: Take 1,500 mg by mouth daily as needed for Heartburn.)    calcium phosphate  trib/vit D3 (CALTRATE GUMMY BITES ORAL) Take by mouth.    clonazePAM (KLONOPIN) 0.5 MG tablet Take 1 tablet (0.5 mg total) by mouth 2 (two) times daily.    DENAVIR 1 % cream RICHAR EXT AA Q 2 H FOR 4 DAYS    fluorometholone 0.1% (FML) 0.1 % DrpS Place 2 drops into the left eye 2 (two) times daily.    furosemide (LASIX) 20 MG tablet     hydrocortisone 2.5 % cream Apply topically to affected area twice daily as needed    lactulose (CHRONULAC) 10 gram/15 mL solution Take 15 mLs (10 g total) by mouth 3 (three) times daily. (Patient taking differently: Take 10 g by mouth 3 (three) times daily. As needed)    levothyroxine (SYNTHROID) 75 MCG tablet Take 1 tablet (75 mcg total) by mouth once daily.    magnesium oxide (MAG-OX) 400 mg (241.3 mg magnesium) tablet TAKE 1 TABLET(400 MG) BY MOUTH TWICE DAILY    multivitamin capsule Take 1 capsule by mouth once daily.    multivitamin capsule Take 1 capsule by mouth once daily.    ondansetron (ZOFRAN-ODT) 4 MG TbDL DISSOLVE 1 TABLET(4 MG) ON THE TONGUE EVERY 6 HOURS AS NEEDED    polyethylene glycol (GLYCOLAX) 17 gram/dose powder MIX 17 GRAMS (1 capful) IN LIQUID AND DRINK BY MOUTH TWICE DAILY (Patient taking differently: Take 17 g by mouth 2 (two) times daily. As needed)    pravastatin (PRAVACHOL) 20 MG tablet Take 1 tablet (20 mg total) by mouth once daily.    prochlorperazine (COMPAZINE) 10 MG tablet Take 1 tablet (10 mg total) by mouth every 6 (six) hours as needed (migraine or nausea).    promethazine (PHENERGAN) 25 MG tablet TAKE 1 TABLET(25 MG) BY MOUTH EVERY 6 HOURS AS NEEDED FOR NAUSEA    ramipriL (ALTACE) 5 MG capsule Take 1 capsule (5 mg total) by mouth once daily.    rifAXIMin (XIFAXAN) 550 mg Tab Take 1 tablet (550 mg total) by mouth 2 (two) times daily.    SHINGRIX, PF, 50 mcg/0.5 mL injection     spironolactone (ALDACTONE) 50 MG tablet     tacrolimus (PROGRAF) 0.5 MG Cap Take 1 capsule (0.5 mg total) by mouth every 12 (twelve) hours.    ubrogepant (UBRELVY) 50 mg tablet  Take 1 tablet po at onset of migraine. May repeat in 2 hours if needed. Max 2 tablets per day.    valACYclovir (VALTREX) 1000 MG tablet TAKE 2 TABLETS(2000 MG) BY MOUTH TWICE DAILY FOR 2 DOSES (Patient taking differently: TAKE 2 TABLETS(2000 MG) BY MOUTH TWICE DAILY FOR 2 DOSES as needed)    venlafaxine (EFFEXOR-XR) 150 MG Cp24 Take 1 capsule (150 mg total) by mouth once daily.     Family History       Problem Relation (Age of Onset)    Heart disease Mother    No Known Problems Father    Stroke Mother          Tobacco Use    Smoking status: Never    Smokeless tobacco: Never   Substance and Sexual Activity    Alcohol use: No    Drug use: No    Sexual activity: Not on file     Review of Systems   Constitutional:  Positive for activity change, appetite change and chills. Negative for fever.   HENT:  Positive for trouble swallowing.    Respiratory:  Negative for cough and shortness of breath.    Cardiovascular:  Negative for leg swelling.   Gastrointestinal:  Positive for abdominal distention, abdominal pain, diarrhea, nausea and vomiting.   Genitourinary:  Negative for decreased urine volume and dysuria.   Musculoskeletal:  Negative for myalgias.   Skin:  Negative for rash.   Neurological:  Positive for syncope, weakness and headaches.   Psychiatric/Behavioral:  Negative for confusion.      Objective:     Vital Signs (Most Recent):  Temp: 98.4 °F (36.9 °C) (12/21/23 0649)  Pulse: 77 (12/21/23 1504)  Resp: 16 (12/21/23 1626)  BP: (!) 144/64 (12/21/23 1502)  SpO2: 96 % (12/21/23 1504) Vital Signs (24h Range):  Temp:  [98.4 °F (36.9 °C)] 98.4 °F (36.9 °C)  Pulse:  [69-85] 77  Resp:  [12-24] 16  SpO2:  [95 %-99 %] 96 %  BP: ()/(44-79) 144/64     Weight: 50.8 kg (112 lb)  Body mass index is 22.62 kg/m².     Physical Exam  Vitals and nursing note reviewed.   Constitutional:       General: She is not in acute distress.     Appearance: Normal appearance. She is ill-appearing.   HENT:      Head: Normocephalic and  atraumatic.      Right Ear: External ear normal.      Left Ear: External ear normal.      Nose: Nose normal.      Mouth/Throat:      Mouth: Mucous membranes are moist.      Pharynx: Oropharynx is clear.   Eyes:      General: Scleral icterus present.      Extraocular Movements: Extraocular movements intact.      Conjunctiva/sclera: Conjunctivae normal.      Pupils: Pupils are equal, round, and reactive to light.   Cardiovascular:      Rate and Rhythm: Normal rate and regular rhythm.      Pulses: Normal pulses.      Heart sounds: Murmur heard.   Pulmonary:      Effort: Pulmonary effort is normal.      Breath sounds: Normal breath sounds.   Abdominal:      General: Bowel sounds are normal. There is distension.      Palpations: Abdomen is soft.      Tenderness: There is abdominal tenderness.   Musculoskeletal:         General: Normal range of motion.      Cervical back: Normal range of motion and neck supple.      Right lower leg: No edema.      Left lower leg: No edema.   Skin:     General: Skin is warm and dry.      Coloration: Skin is jaundiced.   Neurological:      General: No focal deficit present.      Mental Status: She is alert and oriented to person, place, and time.   Psychiatric:         Mood and Affect: Mood normal.         Behavior: Behavior normal.              CRANIAL NERVES     CN III, IV, VI   Pupils are equal, round, and reactive to light.       Significant Labs: All pertinent labs within the past 24 hours have been reviewed.    Significant Imaging: I have reviewed all pertinent imaging results/findings within the past 24 hours.  Assessment/Plan:     * Abdominal pain  - para ordered concern for SBP given known ascites and liver disease  - elevated procal and WBC from baseline, known immunosuppression   - started on CTX  - blood cultures ordered, UA ordered      Esophageal dysphagia  - long standing history of dysphasia to solids, had been tolerating liquids  - now with N/V intolerance of all PO  - CT  with Significant distension of the lower esophagus with fluid and debris, noting a prominent hyperdense structure at the gastroesophageal junction which could result in partial obstruction of the esophagus   - GI consulted, recommend EGD tomorrow concern for possible stricture  - antiemetics PRN, npo holding fluids given liver disease        Decompensated liver disease  - see chronic rejection of liver transplant   - hepatology following      Long-term use of immunosuppressant medication  - cont tacro with daily levels      Chronic rejection of liver transplant  Patient has chronic liver disease due to  Von Gierke disease . They have a history of ascites/volume overload and hepatic encephalopathy. Their liver disease is decompensated due to ascites/volume overload and portal hypertension. Hepatology has been consulted. The patient is not on the liver transplant list. We will obtain daily CBC, CMP, and INR. Their most current Na-MELD is listed below.  MELD 3.0: 16 at 2023  7:49 AM  MELD-Na: 15 at 2023  7:49 AM  Calculated from:  Serum Creatinine: 1.0 mg/dL at 2023  7:49 AM  Serum Sodium: 135 mmol/L at 2023  7:49 AM  Total Bilirubin: 4.2 mg/dL at 2023  7:49 AM  Serum Albumin: 3.3 g/dL at 2023  7:49 AM  INR(ratio): 1.1 at 2023  7:49 AM  Age at listin years  Sex: Female at 2023  7:49 AM      - ammonia elevated but at baseline, no encephalopathy, cont lactulose titrate and hold for diarrhea  - hepatology consulted  - CT: Cirrhotic liver morphology with stigmata of portal hypertension including splenomegaly, collateral vessels, and trace ascites.   - liver u/s: Elevated intraparenchymal resistive indices which may be seen with rejection fluid overload, or chronic liver disease.     Elevated liver enzymes  - elevated liver enzymes in liver transplant patient   - hepatology consulted      Hypothyroidism  - cont home synthroid      S/P liver transplant 02 for von  Gierke disease  - noted, hepatology following  - cont tacro with daily levels          VTE Risk Mitigation (From admission, onward)           Ordered     Place sequential compression device  Until discontinued         12/21/23 1517                       On 12/21/2023, patient should be placed in hospital observation services under my care.             Sangita Hayes MD  Department of Hospital Medicine  [facility] - Telemetry Stepdown

## 2023-12-22 NOTE — ASSESSMENT & PLAN NOTE
- para ordered concern for SBP given known ascites and liver disease  - elevated procal and WBC from baseline, known immunosuppression   - started on CTX  - blood cultures ordered, UA ordered

## 2023-12-22 NOTE — TRANSFER OF CARE
"Anesthesia Transfer of Care Note    Patient: Elda Hernandez    Procedure(s) Performed: Procedure(s) (LRB):  EGD (ESOPHAGOGASTRODUODENOSCOPY) (N/A)    Patient location: PACU    Anesthesia Type: general    Transport from OR: Transported from OR on room air with adequate spontaneous ventilation    Post pain: adequate analgesia    Post assessment: tolerated procedure well and no apparent anesthetic complications    Post vital signs: stable    Level of consciousness: awake, alert and oriented    Nausea/Vomiting: nausea    Complications: none    Transfer of care protocol was followed    Last vitals: Visit Vitals  BP (!) 116/52 (BP Location: Left arm)   Pulse 84   Temp 36.6 °C (97.9 °F) (Temporal)   Resp 18   Ht 4' 11.02" (1.499 m)   Wt 50.8 kg (112 lb)   SpO2 98%   Breastfeeding No   BMI 22.61 kg/m²     "

## 2023-12-22 NOTE — PLAN OF CARE
Problem: Adult Inpatient Plan of Care  Goal: Plan of Care Review  Outcome: Ongoing, Progressing  Flowsheets (Taken 12/22/2023 0453)  Plan of Care Reviewed With: patient  Goal: Optimal Comfort and Wellbeing  Outcome: Ongoing, Progressing  Intervention: Monitor Pain and Promote Comfort  Flowsheets (Taken 12/22/2023 0453)  Pain Management Interventions:   care clustered   medication offered   pain management plan reviewed with patient/caregiver   position adjusted   quiet environment facilitated  Intervention: Provide Person-Centered Care  Flowsheets (Taken 12/22/2023 0453)  Trust Relationship/Rapport:   care explained   choices provided   emotional support provided   empathic listening provided   questions answered   questions encouraged   reassurance provided   thoughts/feelings acknowledged     Problem: Pain Acute  Goal: Acceptable Pain Control and Functional Ability  Outcome: Ongoing, Not Progressing  Intervention: Develop Pain Management Plan  Flowsheets (Taken 12/22/2023 0502)  Pain Management Interventions:   care clustered   medication offered   pain management plan reviewed with patient/caregiver   position adjusted   quiet environment facilitated  Intervention: Prevent or Manage Pain  Flowsheets (Taken 12/22/2023 0502)  Sleep/Rest Enhancement: awakenings minimized  Medication Review/Management:   medications reviewed   dosing adjusted   provider consulted     Patient given PRN zofran and phenergan for nausea. PRN IV pain medicine modified from Q6h to Q4h. Patient remains NPO for today's procedures. Fall precautions maintained.

## 2023-12-22 NOTE — INTERVAL H&P NOTE
The patient has been examined and the H&P has been reviewed:    Pre-Procedure H and P Addendum    Patient seen and examined.  History and exam unchanged from prior history and physical.      Procedure: EGD  Indication: Dysphagia   ASA Class: per anesthesiology  Airway: normal  Neck Mobility: full range of motion  Mallampatti score: per anesthesia  History of anesthesia problems: no  Family history of anesthesia problems: no  Anesthesia Plan: General        Active Hospital Problems    Diagnosis  POA    *Abdominal pain [R10.9]  Yes    Esophageal dysphagia [R13.19]  Yes    Decompensated liver disease [K74.69]  Yes    Long-term use of immunosuppressant medication [Z79.60]  Not Applicable    Chronic rejection of liver transplant [T86.41]  Yes    Elevated liver enzymes [R74.8]  Yes    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

## 2023-12-23 PROBLEM — K22.10 ULCER OF ESOPHAGUS WITHOUT BLEEDING: Status: ACTIVE | Noted: 2023-12-23

## 2023-12-23 LAB
ALBUMIN SERPL BCP-MCNC: 2.6 G/DL (ref 3.5–5.2)
ALP SERPL-CCNC: 447 U/L (ref 55–135)
ALT SERPL W/O P-5'-P-CCNC: 53 U/L (ref 10–44)
ANION GAP SERPL CALC-SCNC: 9 MMOL/L (ref 8–16)
AST SERPL-CCNC: 57 U/L (ref 10–40)
BASOPHILS # BLD AUTO: 0.03 K/UL (ref 0–0.2)
BASOPHILS NFR BLD: 0.6 % (ref 0–1.9)
BILIRUB SERPL-MCNC: 3.5 MG/DL (ref 0.1–1)
BUN SERPL-MCNC: 41 MG/DL (ref 6–20)
CALCIUM SERPL-MCNC: 8.6 MG/DL (ref 8.7–10.5)
CHLORIDE SERPL-SCNC: 109 MMOL/L (ref 95–110)
CO2 SERPL-SCNC: 20 MMOL/L (ref 23–29)
CREAT SERPL-MCNC: 1.2 MG/DL (ref 0.5–1.4)
DIFFERENTIAL METHOD BLD: ABNORMAL
EOSINOPHIL # BLD AUTO: 0.2 K/UL (ref 0–0.5)
EOSINOPHIL NFR BLD: 3.1 % (ref 0–8)
ERYTHROCYTE [DISTWIDTH] IN BLOOD BY AUTOMATED COUNT: 14.9 % (ref 11.5–14.5)
EST. GFR  (NO RACE VARIABLE): 53.5 ML/MIN/1.73 M^2
GLUCOSE SERPL-MCNC: 74 MG/DL (ref 70–110)
HCT VFR BLD AUTO: 28.9 % (ref 37–48.5)
HGB BLD-MCNC: 9.5 G/DL (ref 12–16)
HSV-1 DNA BY PCR: NEGATIVE
HSV-2 DNA BY PCR: NEGATIVE
IMM GRANULOCYTES # BLD AUTO: 0.02 K/UL (ref 0–0.04)
IMM GRANULOCYTES NFR BLD AUTO: 0.4 % (ref 0–0.5)
INR PPP: 1.3 (ref 0.8–1.2)
LYMPHOCYTES # BLD AUTO: 0.5 K/UL (ref 1–4.8)
LYMPHOCYTES NFR BLD: 11 % (ref 18–48)
MCH RBC QN AUTO: 30.7 PG (ref 27–31)
MCHC RBC AUTO-ENTMCNC: 32.9 G/DL (ref 32–36)
MCV RBC AUTO: 94 FL (ref 82–98)
MONOCYTES # BLD AUTO: 0.7 K/UL (ref 0.3–1)
MONOCYTES NFR BLD: 15 % (ref 4–15)
NEUTROPHILS # BLD AUTO: 3.4 K/UL (ref 1.8–7.7)
NEUTROPHILS NFR BLD: 69.9 % (ref 38–73)
NRBC BLD-RTO: 0 /100 WBC
PLATELET # BLD AUTO: 61 K/UL (ref 150–450)
PMV BLD AUTO: 12.7 FL (ref 9.2–12.9)
POTASSIUM SERPL-SCNC: 4.4 MMOL/L (ref 3.5–5.1)
PROT SERPL-MCNC: 6 G/DL (ref 6–8.4)
PROTHROMBIN TIME: 13.3 SEC (ref 9–12.5)
RBC # BLD AUTO: 3.09 M/UL (ref 4–5.4)
SODIUM SERPL-SCNC: 138 MMOL/L (ref 136–145)
TACROLIMUS BLD-MCNC: 4.2 NG/ML (ref 5–15)
VANCOMYCIN SERPL-MCNC: 11.2 UG/ML
WBC # BLD AUTO: 4.81 K/UL (ref 3.9–12.7)

## 2023-12-23 PROCEDURE — 80202 ASSAY OF VANCOMYCIN: CPT | Performed by: STUDENT IN AN ORGANIZED HEALTH CARE EDUCATION/TRAINING PROGRAM

## 2023-12-23 PROCEDURE — 25500020 PHARM REV CODE 255: Performed by: STUDENT IN AN ORGANIZED HEALTH CARE EDUCATION/TRAINING PROGRAM

## 2023-12-23 PROCEDURE — 96366 THER/PROPH/DIAG IV INF ADDON: CPT

## 2023-12-23 PROCEDURE — 99204 OFFICE O/P NEW MOD 45 MIN: CPT | Mod: ,,, | Performed by: STUDENT IN AN ORGANIZED HEALTH CARE EDUCATION/TRAINING PROGRAM

## 2023-12-23 PROCEDURE — 63600175 PHARM REV CODE 636 W HCPCS: Performed by: STUDENT IN AN ORGANIZED HEALTH CARE EDUCATION/TRAINING PROGRAM

## 2023-12-23 PROCEDURE — 85610 PROTHROMBIN TIME: CPT | Performed by: STUDENT IN AN ORGANIZED HEALTH CARE EDUCATION/TRAINING PROGRAM

## 2023-12-23 PROCEDURE — 63600175 PHARM REV CODE 636 W HCPCS: Performed by: HOSPITALIST

## 2023-12-23 PROCEDURE — 85025 COMPLETE CBC W/AUTO DIFF WBC: CPT | Performed by: STUDENT IN AN ORGANIZED HEALTH CARE EDUCATION/TRAINING PROGRAM

## 2023-12-23 PROCEDURE — 96375 TX/PRO/DX INJ NEW DRUG ADDON: CPT

## 2023-12-23 PROCEDURE — G0378 HOSPITAL OBSERVATION PER HR: HCPCS

## 2023-12-23 PROCEDURE — 96376 TX/PRO/DX INJ SAME DRUG ADON: CPT

## 2023-12-23 PROCEDURE — 36415 COLL VENOUS BLD VENIPUNCTURE: CPT | Performed by: STUDENT IN AN ORGANIZED HEALTH CARE EDUCATION/TRAINING PROGRAM

## 2023-12-23 PROCEDURE — C9113 INJ PANTOPRAZOLE SODIUM, VIA: HCPCS | Performed by: STUDENT IN AN ORGANIZED HEALTH CARE EDUCATION/TRAINING PROGRAM

## 2023-12-23 PROCEDURE — 25000003 PHARM REV CODE 250: Performed by: STUDENT IN AN ORGANIZED HEALTH CARE EDUCATION/TRAINING PROGRAM

## 2023-12-23 PROCEDURE — 80053 COMPREHEN METABOLIC PANEL: CPT | Performed by: STUDENT IN AN ORGANIZED HEALTH CARE EDUCATION/TRAINING PROGRAM

## 2023-12-23 PROCEDURE — 87040 BLOOD CULTURE FOR BACTERIA: CPT | Mod: 59 | Performed by: REGISTERED NURSE

## 2023-12-23 PROCEDURE — 99215 OFFICE O/P EST HI 40 MIN: CPT | Mod: ,,, | Performed by: INTERNAL MEDICINE

## 2023-12-23 PROCEDURE — 80197 ASSAY OF TACROLIMUS: CPT | Performed by: STUDENT IN AN ORGANIZED HEALTH CARE EDUCATION/TRAINING PROGRAM

## 2023-12-23 RX ORDER — SPIRONOLACTONE 25 MG/1
50 TABLET ORAL DAILY
Status: DISCONTINUED | OUTPATIENT
Start: 2023-12-24 | End: 2023-12-24

## 2023-12-23 RX ORDER — SUCRALFATE 1 G/10ML
1 SUSPENSION ORAL EVERY 6 HOURS
Status: DISCONTINUED | OUTPATIENT
Start: 2023-12-23 | End: 2023-12-24 | Stop reason: HOSPADM

## 2023-12-23 RX ORDER — FUROSEMIDE 20 MG/1
20 TABLET ORAL DAILY
Status: DISCONTINUED | OUTPATIENT
Start: 2023-12-24 | End: 2023-12-24 | Stop reason: HOSPADM

## 2023-12-23 RX ADMIN — IOHEXOL 40 ML: 350 INJECTION, SOLUTION INTRAVENOUS at 10:12

## 2023-12-23 RX ADMIN — PANTOPRAZOLE SODIUM 40 MG: 40 INJECTION, POWDER, FOR SOLUTION INTRAVENOUS at 08:12

## 2023-12-23 RX ADMIN — DIPHENHYDRAMINE HYDROCHLORIDE 12.5 MG: 50 INJECTION, SOLUTION INTRAMUSCULAR; INTRAVENOUS at 09:12

## 2023-12-23 RX ADMIN — HYDROMORPHONE HYDROCHLORIDE 0.5 MG: 0.5 INJECTION, SOLUTION INTRAMUSCULAR; INTRAVENOUS; SUBCUTANEOUS at 06:12

## 2023-12-23 RX ADMIN — ONDANSETRON 4 MG: 2 INJECTION INTRAMUSCULAR; INTRAVENOUS at 11:12

## 2023-12-23 RX ADMIN — ONDANSETRON 4 MG: 2 INJECTION INTRAMUSCULAR; INTRAVENOUS at 12:12

## 2023-12-23 RX ADMIN — ONDANSETRON 4 MG: 2 INJECTION INTRAMUSCULAR; INTRAVENOUS at 05:12

## 2023-12-23 RX ADMIN — Medication 400 MG: at 08:12

## 2023-12-23 RX ADMIN — VANCOMYCIN HYDROCHLORIDE 750 MG: 750 INJECTION, POWDER, LYOPHILIZED, FOR SOLUTION INTRAVENOUS at 10:12

## 2023-12-23 RX ADMIN — RIFAXIMIN 550 MG: 550 TABLET ORAL at 08:12

## 2023-12-23 RX ADMIN — PROCHLORPERAZINE EDISYLATE 5 MG: 5 INJECTION INTRAMUSCULAR; INTRAVENOUS at 02:12

## 2023-12-23 RX ADMIN — HYDROMORPHONE HYDROCHLORIDE 0.5 MG: 0.5 INJECTION, SOLUTION INTRAMUSCULAR; INTRAVENOUS; SUBCUTANEOUS at 02:12

## 2023-12-23 RX ADMIN — TACROLIMUS 0.5 MG: 0.5 CAPSULE ORAL at 11:12

## 2023-12-23 RX ADMIN — HYDROMORPHONE HYDROCHLORIDE 0.5 MG: 0.5 INJECTION, SOLUTION INTRAMUSCULAR; INTRAVENOUS; SUBCUTANEOUS at 11:12

## 2023-12-23 RX ADMIN — CEFTRIAXONE 2 G: 2 INJECTION, POWDER, FOR SOLUTION INTRAMUSCULAR; INTRAVENOUS at 12:12

## 2023-12-23 RX ADMIN — DIPHENHYDRAMINE HYDROCHLORIDE 12.5 MG: 50 INJECTION, SOLUTION INTRAMUSCULAR; INTRAVENOUS at 02:12

## 2023-12-23 RX ADMIN — SUCRALFATE 1 G: 1 SUSPENSION ORAL at 05:12

## 2023-12-23 RX ADMIN — SUCRALFATE 1 G: 1 SUSPENSION ORAL at 11:12

## 2023-12-23 RX ADMIN — ONDANSETRON 4 MG: 2 INJECTION INTRAMUSCULAR; INTRAVENOUS at 06:12

## 2023-12-23 RX ADMIN — HYDROMORPHONE HYDROCHLORIDE 0.5 MG: 0.5 INJECTION, SOLUTION INTRAMUSCULAR; INTRAVENOUS; SUBCUTANEOUS at 10:12

## 2023-12-23 NOTE — ASSESSMENT & PLAN NOTE
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 Was found to have GPCs in 1/4 bottles, BCID + strep spp. s/p EGD 12/22, procedure note with 9 cm mucosal tear. No clear perforation on imaging, awaiting additional studies. Suspect this is the source of bacteremia.     Recommendations  - continue vanc and ceftriaxone  - repeat blood cultures NGTD  - follow up imaging and any surgical plans

## 2023-12-23 NOTE — ASSESSMENT & PLAN NOTE
- long standing history of dysphasia to solids, had been tolerating liquids  - now with N/V intolerance of all PO  - CT with Significant distension of the lower esophagus with fluid and debris, noting a prominent hyperdense structure at the gastroesophageal junction which could result in partial obstruction of the esophagus   - GI consulted and following- now signed off. Recommend PPI BID   - EGD with ulceration and concern for perforation- CT with oral contrast showing Upper esophagus extraluminal air foci concerning for leak.  Additional more distal esophageal contrast outpouching which may represent esophageal ulcer however can also represent areas of contained leak.  Recommend further evaluation with water-soluble esophagram.   - CTS consulted  - esophogram done and neg for perforation/leak  - antiemetics scheduled, N/V improved  - started clears

## 2023-12-23 NOTE — SUBJECTIVE & OBJECTIVE
No current facility-administered medications on file prior to encounter.     Current Outpatient Medications on File Prior to Encounter   Medication Sig    acetaminophen (TYLENOL) 325 MG tablet Take 2 tablets (650 mg total) by mouth every 6 (six) hours as needed.    butalbitaL-acetaminophen  mg Tab TAKE 1 TABLET BY MOUTH TWICE DAILY AS NEEDED FOR HEADACHE    calcium carbonate (TUMS) 200 mg calcium (500 mg) chewable tablet Take 1 tablet (500 mg total) by mouth 2 (two) times daily as needed. (Patient taking differently: Take 1,500 mg by mouth daily as needed for Heartburn.)    calcium phosphate trib/vit D3 (CALTRATE GUMMY BITES ORAL) Take by mouth.    clonazePAM (KLONOPIN) 0.5 MG tablet Take 1 tablet (0.5 mg total) by mouth 2 (two) times daily.    DENAVIR 1 % cream RICHAR EXT AA Q 2 H FOR 4 DAYS    fluorometholone 0.1% (FML) 0.1 % DrpS Place 2 drops into the left eye 2 (two) times daily.    furosemide (LASIX) 20 MG tablet     hydrocortisone 2.5 % cream Apply topically to affected area twice daily as needed    lactulose (CHRONULAC) 10 gram/15 mL solution Take 15 mLs (10 g total) by mouth 3 (three) times daily. (Patient taking differently: Take 10 g by mouth 3 (three) times daily. As needed)    levothyroxine (SYNTHROID) 75 MCG tablet Take 1 tablet (75 mcg total) by mouth once daily.    magnesium oxide (MAG-OX) 400 mg (241.3 mg magnesium) tablet TAKE 1 TABLET(400 MG) BY MOUTH TWICE DAILY    multivitamin capsule Take 1 capsule by mouth once daily.    multivitamin capsule Take 1 capsule by mouth once daily.    ondansetron (ZOFRAN-ODT) 4 MG TbDL DISSOLVE 1 TABLET(4 MG) ON THE TONGUE EVERY 6 HOURS AS NEEDED    polyethylene glycol (GLYCOLAX) 17 gram/dose powder MIX 17 GRAMS (1 capful) IN LIQUID AND DRINK BY MOUTH TWICE DAILY (Patient taking differently: Take 17 g by mouth 2 (two) times daily. As needed)    pravastatin (PRAVACHOL) 20 MG tablet Take 1 tablet (20 mg total) by mouth once daily.    prochlorperazine (COMPAZINE)  10 MG tablet Take 1 tablet (10 mg total) by mouth every 6 (six) hours as needed (migraine or nausea).    promethazine (PHENERGAN) 25 MG tablet TAKE 1 TABLET(25 MG) BY MOUTH EVERY 6 HOURS AS NEEDED FOR NAUSEA    ramipriL (ALTACE) 5 MG capsule Take 1 capsule (5 mg total) by mouth once daily.    rifAXIMin (XIFAXAN) 550 mg Tab Take 1 tablet (550 mg total) by mouth 2 (two) times daily.    SHINGRIX, PF, 50 mcg/0.5 mL injection     spironolactone (ALDACTONE) 50 MG tablet     tacrolimus (PROGRAF) 0.5 MG Cap Take 1 capsule (0.5 mg total) by mouth every 12 (twelve) hours.    ubrogepant (UBRELVY) 50 mg tablet Take 1 tablet po at onset of migraine. May repeat in 2 hours if needed. Max 2 tablets per day.    valACYclovir (VALTREX) 1000 MG tablet TAKE 2 TABLETS(2000 MG) BY MOUTH TWICE DAILY FOR 2 DOSES (Patient taking differently: TAKE 2 TABLETS(2000 MG) BY MOUTH TWICE DAILY FOR 2 DOSES as needed)    venlafaxine (EFFEXOR-XR) 150 MG Cp24 Take 1 capsule (150 mg total) by mouth once daily.       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    Arnold-Chiari malformation     Depression     Esophageal stricture     Essential tremor     Hypertension     Left bundle branch block     Liver fibrosis, transplanted liver 10/2/2018    Suggested on fibroscan 10/2/18    Migraine without aura     MVP (mitral valve prolapse)     Non-rheumatic mitral regurgitation 10/1/2018    Non-rheumatic tricuspid valve insufficiency 10/1/2018    Osteoporosis     Recurrent urinary tract infection     Seizures     Shingles 2007    SIADH (syndrome of inappropriate ADH production)     Squamous  cell carcinoma 10/2014    vaginal    Tricuspid valve prolapse     Urolithiasis     Von Gierke disease     s/p liver transplant     Past Surgical History:   Procedure Laterality Date    APPENDECTOMY  6/22/2007    APPLICATION OF WOUND VACUUM-ASSISTED CLOSURE DEVICE N/A 9/18/2020    Procedure: APPLICATION, WOUND VAC;  Surgeon: Zain Decker MD;  [facility];  Service: General;  Laterality: N/A;    COLONOSCOPY  5/13/2008    internal hemorrhoids    CRANIOTOMY      ESOPHAGOGASTRODUODENOSCOPY N/A 9/3/2020    Procedure: EGD (ESOPHAGOGASTRODUODENOSCOPY);  Surgeon: Tyrel Vergara MD;  Location: [facility];  Service: Endoscopy;  Laterality: N/A;    ESOPHAGOGASTRODUODENOSCOPY N/A 12/22/2023    Procedure: EGD (ESOPHAGOGASTRODUODENOSCOPY);  Surgeon: Jhony James MD;  [facility]  Service: Endoscopy;  Laterality: N/A;    EXPLORATORY LAPAROTOMY  2020    due to perforated stomach    LAMINECTOMY  3/2001    LIVER TRANSPLANT  9/23/2002    OSSICULAR RECONSTRUCTION  10/4/1995    RIGHT REPLACEMENT PROSTHESIS for cholesteatoma    THYMECTOMY  5/2/2007    TONSILLECTOMY, ADENOIDECTOMY  1/21/2004    TOTAL ABDOMINAL HYSTERECTOMY  3/31/1994     Family History       Problem Relation (Age of Onset)    Heart disease Mother    No Known Problems Father    Stroke Mother          Tobacco Use    Smoking status: Never    Smokeless tobacco: Never   Substance and Sexual Activity    Alcohol use: No    Drug use: No    Sexual activity: Not on file     Review of Systems   Constitutional:  Negative for activity change, appetite change, fatigue and fever.   HENT: Negative.     Respiratory:  Negative for shortness of breath and wheezing.    Cardiovascular:  Positive for chest pain.   Gastrointestinal:  Positive for abdominal distention, abdominal pain, diarrhea, nausea and vomiting.   Genitourinary: Negative.    Musculoskeletal: Negative.    Skin:  Negative for pallor and wound.   Neurological: Negative.     Psychiatric/Behavioral: Negative.       Objective:     Vital Signs (Most Recent):  Temp: 98.9 °F (37.2 °C) (12/23/23 0752)  Pulse: 81 (12/23/23 1120)  Resp: 18 (12/23/23 1120)  BP: (!) 150/70 (12/23/23 1120)  SpO2: 99 % (12/23/23 1120) Vital Signs (24h Range):  Temp:  [98.5 °F (36.9 °C)-99.5 °F (37.5 °C)] 98.9 °F (37.2 °C)  Pulse:  [73-81] 81  Resp:  [16-20] 18  SpO2:  [92 %-99 %] 99 %  BP: (130-157)/(63-70) 150/70     Weight: 50.8 kg (112 lb)  Body mass index is 22.61 kg/m².  ECOG Performance Status Grade: 2 - Ambulates, capable of self care only    Intake/Output - Last 3 Shifts         12/21 0700  12/22 0659 12/22 0700  12/23 0659 12/23 0700  12/24 0659    P.O. 250      IV Piggyback 649.1 700     Total Intake(mL/kg) 899.1 (17.7) 700 (13.8)     Urine (mL/kg/hr) 450 (0.4) 650 (0.5)     Total Output 450 650     Net +449.1 +50            Stool Occurrence 0 x              SpO2: 99 %        Physical Exam  Vitals and nursing note reviewed.   Constitutional:       Appearance: Normal appearance.   HENT:      Head: Normocephalic and atraumatic.   Cardiovascular:      Rate and Rhythm: Normal rate and regular rhythm.   Pulmonary:      Effort: Pulmonary effort is normal. No respiratory distress.   Abdominal:      General: Abdomen is flat. There is no distension.      Palpations: Abdomen is soft. There is no mass.      Hernia: No hernia is present.      Comments: Mild abdominal tenderness diffusely   Musculoskeletal:      Right lower leg: No edema.      Left lower leg: No edema.   Skin:     General: Skin is warm and dry.   Neurological:      General: No focal deficit present.      Mental Status: She is alert and oriented to person, place, and time.   Psychiatric:         Mood and Affect: Mood normal.         Behavior: Behavior normal.            Significant Labs:  CBC:   Recent Labs   Lab 12/23/23  0502   WBC 4.81   RBC 3.09*   HGB 9.5*   HCT 28.9*   PLT 61*   MCV 94   MCH 30.7   MCHC 32.9     CMP:   Recent Labs   Lab  "12/23/23  0502   GLU 74   CALCIUM 8.6*   ALBUMIN 2.6*   PROT 6.0      K 4.4   CO2 20*      BUN 41*   CREATININE 1.2   ALKPHOS 447*   ALT 53*   AST 57*   BILITOT 3.5*     Coagulation:   Recent Labs   Lab 12/23/23  0502   INR 1.3*     Lactic Acid: No results for input(s): "LACTATE" in the last 48 hours.    Significant Diagnostics:  CT: I have reviewed all pertinent results/findings within the past 24 hours    VTE Risk Mitigation (From admission, onward)           Ordered     Place sequential compression device  Until discontinued         12/21/23 1517                    "

## 2023-12-23 NOTE — CONSULTS
[facility] - Telemetry Stepdown  Thoracic Surgery  Consult Note    Patient Name: Elda Hernandez  MRN: [id number]  Code Status: Full Code  Admission Date: 12/21/2023  Hospital Length of Stay: 0 days  Consult Requesting Physician: Sangita Hayes  Consulting Physician: Solomon Blair   Primary Care Provider: David Lorenzo MD    Inpatient consult to Cardiothoracic Surgery  Consult performed by: Kendy Amaro MD  Consult ordered by: Sangita Hayes MD        Subjective:     Reason for Consult: Esophageal perforation    History of Present Illness: 53 year old female with PMH of von Gierke disease s/p liver transplant (2002, on tacro + MMF), hx chronic rejection (bx 2015 with acute and chronic rejection s/p steroids) now with cirrhosis, gastric perforation requiring lisa patch repqir and gastrostomy tube 2020, chronic esophageal stenosis on liquid diet for several years, malnutrition who presented to ED with new chest pain following several days of nausea, vomiting, diarrhea at home which started last Monday. Work up proceeded which demonstrated 9 cm linear ulceration of the mid-distal esophagus proximal to esophageal stenosis with possible perforation. CT with oral contrast demonstrated two small areas of abnormal outpouching and one small foci of extraluminal air, however no contrast extravasation. Small right sided, simple appearing pleural effusion.  Esophagram obtained today demonstrated no leakage of contrast.     WBC within normal limits and she is respirating well on room air.         No current facility-administered medications on file prior to encounter.     Current Outpatient Medications on File Prior to Encounter   Medication Sig    acetaminophen (TYLENOL) 325 MG tablet Take 2 tablets (650 mg total) by mouth every 6 (six) hours as needed.    butalbitaL-acetaminophen  mg Tab TAKE 1 TABLET BY MOUTH TWICE DAILY AS NEEDED FOR HEADACHE    calcium carbonate (TUMS) 200 mg calcium (500 mg) chewable tablet Take 1  tablet (500 mg total) by mouth 2 (two) times daily as needed. (Patient taking differently: Take 1,500 mg by mouth daily as needed for Heartburn.)    calcium phosphate trib/vit D3 (CALTRATE GUMMY BITES ORAL) Take by mouth.    clonazePAM (KLONOPIN) 0.5 MG tablet Take 1 tablet (0.5 mg total) by mouth 2 (two) times daily.    DENAVIR 1 % cream RICHAR EXT AA Q 2 H FOR 4 DAYS    fluorometholone 0.1% (FML) 0.1 % DrpS Place 2 drops into the left eye 2 (two) times daily.    furosemide (LASIX) 20 MG tablet     hydrocortisone 2.5 % cream Apply topically to affected area twice daily as needed    lactulose (CHRONULAC) 10 gram/15 mL solution Take 15 mLs (10 g total) by mouth 3 (three) times daily. (Patient taking differently: Take 10 g by mouth 3 (three) times daily. As needed)    levothyroxine (SYNTHROID) 75 MCG tablet Take 1 tablet (75 mcg total) by mouth once daily.    magnesium oxide (MAG-OX) 400 mg (241.3 mg magnesium) tablet TAKE 1 TABLET(400 MG) BY MOUTH TWICE DAILY    multivitamin capsule Take 1 capsule by mouth once daily.    multivitamin capsule Take 1 capsule by mouth once daily.    ondansetron (ZOFRAN-ODT) 4 MG TbDL DISSOLVE 1 TABLET(4 MG) ON THE TONGUE EVERY 6 HOURS AS NEEDED    polyethylene glycol (GLYCOLAX) 17 gram/dose powder MIX 17 GRAMS (1 capful) IN LIQUID AND DRINK BY MOUTH TWICE DAILY (Patient taking differently: Take 17 g by mouth 2 (two) times daily. As needed)    pravastatin (PRAVACHOL) 20 MG tablet Take 1 tablet (20 mg total) by mouth once daily.    prochlorperazine (COMPAZINE) 10 MG tablet Take 1 tablet (10 mg total) by mouth every 6 (six) hours as needed (migraine or nausea).    promethazine (PHENERGAN) 25 MG tablet TAKE 1 TABLET(25 MG) BY MOUTH EVERY 6 HOURS AS NEEDED FOR NAUSEA    ramipriL (ALTACE) 5 MG capsule Take 1 capsule (5 mg total) by mouth once daily.    rifAXIMin (XIFAXAN) 550 mg Tab Take 1 tablet (550 mg total) by mouth 2 (two) times daily.    SHINGRIX, PF, 50 mcg/0.5 mL injection      spironolactone (ALDACTONE) 50 MG tablet     tacrolimus (PROGRAF) 0.5 MG Cap Take 1 capsule (0.5 mg total) by mouth every 12 (twelve) hours.    ubrogepant (UBRELVY) 50 mg tablet Take 1 tablet po at onset of migraine. May repeat in 2 hours if needed. Max 2 tablets per day.    valACYclovir (VALTREX) 1000 MG tablet TAKE 2 TABLETS(2000 MG) BY MOUTH TWICE DAILY FOR 2 DOSES (Patient taking differently: TAKE 2 TABLETS(2000 MG) BY MOUTH TWICE DAILY FOR 2 DOSES as needed)    venlafaxine (EFFEXOR-XR) 150 MG Cp24 Take 1 capsule (150 mg total) by mouth once daily.       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    Arnold-Chiari malformation     Depression     Esophageal stricture     Essential tremor     Hypertension     Left bundle branch block     Liver fibrosis, transplanted liver 10/2/2018    Suggested on fibroscan 10/2/18    Migraine without aura     MVP (mitral valve prolapse)     Non-rheumatic mitral regurgitation 10/1/2018    Non-rheumatic tricuspid valve insufficiency 10/1/2018    Osteoporosis     Recurrent urinary tract infection     Seizures     Shingles 2007    SIADH (syndrome of inappropriate ADH production)     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    Tricuspid valve prolapse     Urolithiasis     Von Gierke disease     s/p liver transplant     Past Surgical History:   Procedure Laterality Date    APPENDECTOMY  6/22/2007    APPLICATION OF WOUND VACUUM-ASSISTED CLOSURE DEVICE N/A 9/18/2020    Procedure: APPLICATION, WOUND VAC;  Surgeon: Zain Decker MD;  Location: [facility] OR [address] [place] [address];  Service: General;  Laterality: N/A;    COLONOSCOPY   5/13/2008    internal hemorrhoids    CRANIOTOMY      ESOPHAGOGASTRODUODENOSCOPY N/A 9/3/2020    Procedure: EGD (ESOPHAGOGASTRODUODENOSCOPY);  Surgeon: Tyrel Vergara MD;  Location: [facility];  Service: Endoscopy;  Laterality: N/A;    ESOPHAGOGASTRODUODENOSCOPY N/A 12/22/2023    Procedure: EGD (ESOPHAGOGASTRODUODENOSCOPY);  Surgeon: Jhony James MD;  [facility]: [facility] ENDO ([facility]);  Service: Endoscopy;  Laterality: N/A;    EXPLORATORY LAPAROTOMY  2020    due to perforated stomach    LAMINECTOMY  3/2001    LIVER TRANSPLANT  9/23/2002    OSSICULAR RECONSTRUCTION  10/4/1995    RIGHT REPLACEMENT PROSTHESIS for cholesteatoma    THYMECTOMY  5/2/2007    TONSILLECTOMY, ADENOIDECTOMY  1/21/2004    TOTAL ABDOMINAL HYSTERECTOMY  3/31/1994     Family History       Problem Relation (Age of Onset)    Heart disease Mother    No Known Problems Father    Stroke Mother          Tobacco Use    Smoking status: Never    Smokeless tobacco: Never   Substance and Sexual Activity    Alcohol use: No    Drug use: No    Sexual activity: Not on file     Review of Systems   Constitutional:  Negative for activity change, appetite change, fatigue and fever.   HENT: Negative.     Respiratory:  Negative for shortness of breath and wheezing.    Cardiovascular:  Positive for chest pain.   Gastrointestinal:  Positive for abdominal distention, abdominal pain, diarrhea, nausea and vomiting.   Genitourinary: Negative.    Musculoskeletal: Negative.    Skin:  Negative for pallor and wound.   Neurological: Negative.    Psychiatric/Behavioral: Negative.       Objective:     Vital Signs (Most Recent):  Temp: 98.9 °F (37.2 °C) (12/23/23 0752)  Pulse: 81 (12/23/23 1120)  Resp: 18 (12/23/23 1120)  BP: (!) 150/70 (12/23/23 1120)  SpO2: 99 % (12/23/23 1120) Vital Signs (24h Range):  Temp:  [98.5 °F (36.9 °C)-99.5 °F (37.5 °C)] 98.9 °F (37.2 °C)  Pulse:  [73-81] 81  Resp:  [16-20] 18  SpO2:  [92 %-99 %] 99 %  BP: (130-157)/(63-70) 150/70     Weight: 50.8 kg  "(112 lb)  Body mass index is 22.61 kg/m².  ECOG Performance Status Grade: 2 - Ambulates, capable of self care only    Intake/Output - Last 3 Shifts         12/21 0700 12/22 0659 12/22 0700 12/23 0659 12/23 0700 12/24 0659    P.O. 250      IV Piggyback 649.1 700     Total Intake(mL/kg) 899.1 (17.7) 700 (13.8)     Urine (mL/kg/hr) 450 (0.4) 650 (0.5)     Total Output 450 650     Net +449.1 +50            Stool Occurrence 0 x              SpO2: 99 %        Physical Exam  Vitals and nursing note reviewed.   Constitutional:       Appearance: Normal appearance.   HENT:      Head: Normocephalic and atraumatic.   Cardiovascular:      Rate and Rhythm: Normal rate and regular rhythm.   Pulmonary:      Effort: Pulmonary effort is normal. No respiratory distress.   Abdominal:      General: Abdomen is flat. There is no distension.      Palpations: Abdomen is soft. There is no mass.      Hernia: No hernia is present.      Comments: Mild abdominal tenderness diffusely   Musculoskeletal:      Right lower leg: No edema.      Left lower leg: No edema.   Skin:     General: Skin is warm and dry.   Neurological:      General: No focal deficit present.      Mental Status: She is alert and oriented to person, place, and time.   Psychiatric:         Mood and Affect: Mood normal.         Behavior: Behavior normal.            Significant Labs:  CBC:   Recent Labs   Lab 12/23/23  0502   WBC 4.81   RBC 3.09*   HGB 9.5*   HCT 28.9*   PLT 61*   MCV 94   MCH 30.7   MCHC 32.9     CMP:   Recent Labs   Lab 12/23/23  0502   GLU 74   CALCIUM 8.6*   ALBUMIN 2.6*   PROT 6.0      K 4.4   CO2 20*      BUN 41*   CREATININE 1.2   ALKPHOS 447*   ALT 53*   AST 57*   BILITOT 3.5*     Coagulation:   Recent Labs   Lab 12/23/23  0502   INR 1.3*     Lactic Acid: No results for input(s): "LACTATE" in the last 48 hours.    Significant Diagnostics:  CT: I have reviewed all pertinent results/findings within the past 24 hours    VTE Risk Mitigation (From " admission, onward)           Ordered     Place sequential compression device  Until discontinued         12/21/23 2307                    Assessment/Plan:     * Ulcer of esophagus without bleeding  53 year old female with above history presenting with esophageal ulceration and possibly contained perforation following several days of vomiting at home. If there was a full thickness perforation it is now contained. Not amendable to stenting per GI given length of ulceration. Ct with oral contrast and esophagram both negative for active extravasation of contrast. She does have small pleural effusion on the right.     - No indication for surgical intervention given lack of clear perforation on esophagram, CT w/ oral contrast.  - Okay to trial thin, clear liquids   - If nausea occurs, maintain patient with NPO status and treat symptoms to prevent further retching and/or emesis   - BID PPI, sucralfate  - Nutrition consult given malnutrition and need for liquid diet             Thank you for your consult. I will follow-up with patient. Please contact us if you have any additional questions.    Kendy Amaro MD  Thoracic Surgery  [facility] - Telemetry Stepdown

## 2023-12-23 NOTE — ASSESSMENT & PLAN NOTE
Patient has chronic liver disease due to  Von Gierke disease . They have a history of ascites/volume overload and hepatic encephalopathy. Their liver disease is decompensated due to ascites/volume overload and portal hypertension. Hepatology has been consulted. The patient is not on the liver transplant list. We will obtain daily CBC, CMP, and INR. Their most current Na-MELD is listed below.  MELD 3.0: 19 at 2023  5:02 AM  MELD-Na: 16 at 2023  5:02 AM  Calculated from:  Serum Creatinine: 1.2 mg/dL at 2023  5:02 AM  Serum Sodium: 138 mmol/L (Using max of 137 mmol/L) at 2023  5:02 AM  Total Bilirubin: 3.5 mg/dL at 2023  5:02 AM  Serum Albumin: 2.6 g/dL at 2023  5:02 AM  INR(ratio): 1.3 at 2023  5:02 AM  Age at listin years  Sex: Female at 2023  5:02 AM      - ammonia elevated but at baseline, no encephalopathy, cont lactulose titrate and hold for diarrhea  - hepatology consulted and following  - CT: Cirrhotic liver morphology with stigmata of portal hypertension including splenomegaly, collateral vessels, and trace ascites.   - liver u/s: Elevated intraparenchymal resistive indices which may be seen with rejection fluid overload, or chronic liver disease.   - unable to do para, not enough fluid for safe para, cont CTX  - cont sublingual prograf with daily levels.

## 2023-12-23 NOTE — PROGRESS NOTES
[facility] - Telemetry [facility] Medicine  Progress Note    Patient Name: Elda Hernandez  MRN: [id number]  Patient Class: OP- Observation   Admission Date: 12/21/2023  Length of Stay: 0 days  Attending Physician: Sangita Hayes MD  Primary Care Provider: David Lorenzo MD        Subjective:     Principal Problem:Ulcer of esophagus without bleeding        HPI:  Elda Hernandez is a 53 y.o. 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 Pt reports N/V with occasional bilious emesis. She reports being on liquid diet since discharge from her last hospital stay in 03/2023. Pt denies any sick contacts. She reports chills but no fevers and is on rejection medications for her transplant. She attempted Zofran at home without change to her N/V symptoms. Pt reports a fainting episode earlier this morning and isn't sure if she hit her head but reports headache.      Feels like food and pills get stuck.   Abd pain, N/V. Denies fever or dysuria.     Overview/Hospital Course:  Admitted for N/V abd pain. CT with abnormality at esophageal junction concern for obstruction, GI consulted. EGD showed ulceration with concern for perforation. Recommended CT with oral contrast which showed outpouching concerning for leak. CTS surgery consulted. Esophagram was negative for leak/perforation. Clears restarted and emesis improved.    Not enough fluid for para, cont CTX. Bcx + Strep, vanc started and ID consulted. Hepatology following given history of liver transplant.   Diuretics held for JACQUE and NPO, resumed.     Interval History: No new concerns, reports improvement in N/V, still with abd pain controlled with pain meds.     Esophagram done and neg for leak/perforation. Clears started.       Review of Systems   Constitutional:  Negative for fever.   Respiratory:  Negative for shortness of  breath.    Gastrointestinal:  Positive for abdominal pain. Negative for nausea and vomiting.   Psychiatric/Behavioral:  Negative for confusion.      Objective:     Vital Signs (Most Recent):  Temp: 98.9 °F (37.2 °C) (12/23/23 0752)  Pulse: 81 (12/23/23 1120)  Resp: 18 (12/23/23 1120)  BP: (!) 150/70 (12/23/23 1120)  SpO2: 99 % (12/23/23 1120) Vital Signs (24h Range):  Temp:  [98.5 °F (36.9 °C)-99.5 °F (37.5 °C)] 98.9 °F (37.2 °C)  Pulse:  [73-81] 81  Resp:  [16-20] 18  SpO2:  [92 %-99 %] 99 %  BP: (130-157)/(63-70) 150/70     Weight: 50.8 kg (112 lb)  Body mass index is 22.61 kg/m².    Intake/Output Summary (Last 24 hours) at 12/23/2023 1433  Last data filed at 12/23/2023 1248  Gross per 24 hour   Intake --   Output 1300 ml   Net -1300 ml           Physical Exam  Vitals and nursing note reviewed.   Constitutional:       General: She is not in acute distress.     Appearance: Normal appearance. She is not ill-appearing.   HENT:      Head: Normocephalic and atraumatic.      Right Ear: External ear normal.      Left Ear: External ear normal.      Nose: Nose normal.      Mouth/Throat:      Mouth: Mucous membranes are moist.      Pharynx: Oropharynx is clear.   Eyes:      General: Scleral icterus present.      Extraocular Movements: Extraocular movements intact.      Conjunctiva/sclera: Conjunctivae normal.      Pupils: Pupils are equal, round, and reactive to light.   Cardiovascular:      Rate and Rhythm: Normal rate and regular rhythm.      Pulses: Normal pulses.      Heart sounds: Murmur heard.   Pulmonary:      Effort: Pulmonary effort is normal.      Breath sounds: Normal breath sounds.   Abdominal:      General: Bowel sounds are normal. There is distension.      Palpations: Abdomen is soft.      Tenderness: There is abdominal tenderness.   Musculoskeletal:         General: Normal range of motion.      Cervical back: Normal range of motion and neck supple.      Right lower leg: No edema.      Left lower leg: No  edema.   Skin:     General: Skin is warm and dry.      Coloration: Skin is jaundiced.   Neurological:      General: No focal deficit present.      Mental Status: She is alert and oriented to person, place, and time.   Psychiatric:         Mood and Affect: Mood normal.         Behavior: Behavior normal.             Significant Labs: All pertinent labs within the past 24 hours have been reviewed.    Significant Imaging: I have reviewed all pertinent imaging results/findings within the past 24 hours.    Assessment/Plan:      * Ulcer of esophagus without bleeding  - seen on EGD by GI  - biopsied- path pending   - cont PPI BID      Bacteremia  - Bcx +Strep viridans, started vanc  - ID consulted- recommend cont CTX/Vanc  - repeat Bcx NGTD      Esophageal dysphagia  - long standing history of dysphasia to solids, had been tolerating liquids  - now with N/V intolerance of all PO  - CT with Significant distension of the lower esophagus with fluid and debris, noting a prominent hyperdense structure at the gastroesophageal junction which could result in partial obstruction of the esophagus   - GI consulted and following- now signed off. Recommend PPI BID   - EGD with ulceration and concern for perforation- CT with oral contrast showing Upper esophagus extraluminal air foci concerning for leak.  Additional more distal esophageal contrast outpouching which may represent esophageal ulcer however can also represent areas of contained leak.  Recommend further evaluation with water-soluble esophagram.   - CTS consulted  - esophogram done and neg for perforation/leak  - antiemetics scheduled, N/V improved  - started clears         Decompensated liver disease  - see chronic rejection of liver transplant   - hepatology following      JACQUE (acute kidney injury)  Patient with acute kidney injury/acute renal failure likely due to pre-renal azotemia due to dehydration JACQUE is currently improving. Baseline creatinine  1  - Labs reviewed-  Renal function/electrolytes with Estimated Creatinine Clearance: 40.8 mL/min (based on SCr of 1.2 mg/dL). according to latest data. Monitor urine output and serial BMP and adjust therapy as needed. Avoid nephrotoxins and renally dose meds for GFR listed above.        Long-term use of immunosuppressant medication  - cont sublingual tacro with daily levels      Chronic rejection of liver transplant  Patient has chronic liver disease due to  Von Gierke disease . They have a history of ascites/volume overload and hepatic encephalopathy. Their liver disease is decompensated due to ascites/volume overload and portal hypertension. Hepatology has been consulted. The patient is not on the liver transplant list. We will obtain daily CBC, CMP, and INR. Their most current Na-MELD is listed below.  MELD 3.0: 19 at 2023  5:02 AM  MELD-Na: 16 at 2023  5:02 AM  Calculated from:  Serum Creatinine: 1.2 mg/dL at 2023  5:02 AM  Serum Sodium: 138 mmol/L (Using max of 137 mmol/L) at 2023  5:02 AM  Total Bilirubin: 3.5 mg/dL at 2023  5:02 AM  Serum Albumin: 2.6 g/dL at 2023  5:02 AM  INR(ratio): 1.3 at 2023  5:02 AM  Age at listin years  Sex: Female at 2023  5:02 AM      - ammonia elevated but at baseline, no encephalopathy, cont lactulose titrate and hold for diarrhea  - hepatology consulted and following  - CT: Cirrhotic liver morphology with stigmata of portal hypertension including splenomegaly, collateral vessels, and trace ascites.   - liver u/s: Elevated intraparenchymal resistive indices which may be seen with rejection fluid overload, or chronic liver disease.   - unable to do para, not enough fluid for safe para, cont CTX  - cont sublingual prograf with daily levels.     Elevated liver enzymes  - elevated liver enzymes in liver transplant patient - downtrending   - hepatology consulted      Abdominal pain  - para ordered concern for SBP given known ascites and liver disease-  unable to do para, not enough fluid  - elevated procal and WBC from baseline, known immunosuppression   - started on CTX, vanc  - blood cultures + see bacteremia, UA noninfectious       Hypothyroidism  - cont home synthroid      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  - noted, hepatology following  - cont tacro with daily levels  - sublingual tac 0.5 BID          VTE Risk Mitigation (From admission, onward)           Ordered     Place sequential compression device  Until discontinued         12/21/23 9202                    Discharge Planning   MARILEE: 12/25/2023     Code Status: Full Code   Is the patient medically ready for discharge?:     Reason for patient still in hospital (select all that apply): Patient trending condition and Consult recommendations                     Sangita Hayes MD  Department of Hospital Medicine   [facility] - Telemetry Stepdown

## 2023-12-23 NOTE — HPI
53 year old female with PMH of von Gierke disease s/p liver transplant (2002, on tacro + MMF), hx chronic rejection (bx 2015 with acute and chronic rejection s/p steroids) now with cirrhosis, gastric perforation requiring lisa patch repqir and gastrostomy tube 2020, chronic esophageal stenosis on liquid diet for several years, malnutrition who presented to ED with new chest pain following several days of nausea, vomiting, diarrhea at home which started last Monday. Work up proceeded which demonstrated 9 cm linear ulceration of the mid-distal esophagus proximal to esophageal stenosis with possible perforation. CT with oral contrast demonstrated two small areas of abnormal outpouching and one small foci of extraluminal air, however no contrast extravasation. Small right sided, simple appearing pleural effusion.  Esophagram obtained today demonstrated no leakage of contrast.     WBC within normal limits and she is respirating well on room air.

## 2023-12-23 NOTE — TREATMENT PLAN
Hepatology Treatment Plan    Elda Hernandez is a 53 y.o. female admitted to hospital 2023 (Hospital Day: 3) due to Ulcer of esophagus without bleeding.     Interval History  CT with oral contrast demonstrated two small areas of abnormal outpouching and one small foci of extraluminal air, however no contrast extravasation. Small right sided, simple appearing pleural effusion.  Esophagram obtained today demonstrated no leakage of contrast. CTS team consulted.     Pro level 4.2 today.  AST 57, ALT 53, creatinine 1.2.    ID recommended vanc and Rocephin for blood cx + strep spp.     Objective  Temp:  [98.5 °F (36.9 °C)-99.5 °F (37.5 °C)] 98.9 °F (37.2 °C) ( 0752)  Pulse:  [73-81] 81 ( 1120)  BP: (130-157)/(63-70) 150/70 ( 1120)  Resp:  [16-20] 18 ( 1120)  SpO2:  [92 %-99 %] 99 % ( 1120)        Laboratory    Lab Results   Component Value Date    WBC 4.81 2023    HGB 9.5 (L) 2023    HCT 28.9 (L) 2023    MCV 94 2023    PLT 61 (L) 2023       Lab Results   Component Value Date     2023    K 4.4 2023     2023    CO2 20 (L) 2023    BUN 41 (H) 2023    CREATININE 1.2 2023    CALCIUM 8.6 (L) 2023       Lab Results   Component Value Date    ALBUMIN 2.6 (L) 2023    ALT 53 (H) 2023    AST 57 (H) 2023     (H) 2018    ALKPHOS 447 (H) 2023    BILITOT 3.5 (H) 2023       Lab Results   Component Value Date    INR 1.3 (H) 2023    INR 1.3 (H) 2023    INR 1.1 2023       MELD 3.0: 19 at 2023  5:02 AM  MELD-Na: 16 at 2023  5:02 AM  Calculated from:  Serum Creatinine: 1.2 mg/dL at 2023  5:02 AM  Serum Sodium: 138 mmol/L (Using max of 137 mmol/L) at 2023  5:02 AM  Total Bilirubin: 3.5 mg/dL at 2023  5:02 AM  Serum Albumin: 2.6 g/dL at 2023  5:02 AM  INR(ratio): 1.3 at 2023  5:02 AM  Age at listin years  Sex: Female at  12/23/2023  5:02 AM      Assessment  Elda Hernandez is a 53 y.o. female with history of Von Gierke disease (status post liver transplant in 2002 with post-transplant course associated with chronic rejection and recurrent cirrhosis of graft), erosive esophagitis,  anxiety and stage III CKD who presented to the ER with nausea, vomiting, abdominal pain and diarrhea.  CT scan concerning for lower esophageal dilation and abnormality at GE junction. EGD was performed 12/22/2023 - 9 cm linear tear with ulceration found in middle and lower 3rd of esophagus, severe stenosis 35 cm from incisors, deformity in gastric fundus, and severe stenosis at pylorus was found.  CT with oral contrast demonstrated two small areas of abnormal outpouching and one small foci of extraluminal air, however no contrast extravasation. CTS ruled out surgical intervention.     LFTs indicate chronic rejection; managed on Prograf 0.5 mg b.i.d. Hepatic encephalopathy well-controlled with lactulose and Xifaxan. The possibility of re transplantation has been discussed; patient would be high risk given her multiple abdominal surgeries.     Problem List:  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, now with cirrhosis in allograft  Chronic rejection   HE  Esophageal tear  Plan  - Continue Prograf 0.5 mg BID. If vomiting, schedule anti-emetics.    - Appreciate ID recs with regards to management of bacteremia.      - Lactulose TID and Rifaximin BID; titrate dose frequency of Lactulose to 3 bowel movement daily.      - Minimize use of sedating agents that may precipitate encephalopathy (e.g. opioids and benzo's).      - Appreciate GI & CTS recs. PPI & Carafate per their recs.     - please obtain daily CBC, CMP, AM Prograf level  - Plan of care was discussed with primary team    Thank you for involving us in the care of Elda Hernandez. Please call with any additional concerns or questions.    Plilo Maguire MD, PGY-C  Gastroenterology  Fellow  [facility]

## 2023-12-23 NOTE — SUBJECTIVE & OBJECTIVE
Interval History: no events, seen by thoracic surgery for possible esophageal perforation. No clear leak on esophagram. Recommending CT w/ oral contrast. Blood cx + viridans strep, likely related to esophageal pathology    Review of Systems   All other systems reviewed and are negative.    Objective:     Vital Signs (Most Recent):  Temp: 98.9 °F (37.2 °C) (12/23/23 0752)  Pulse: 81 (12/23/23 1120)  Resp: 18 (12/23/23 1120)  BP: (!) 150/70 (12/23/23 1120)  SpO2: 99 % (12/23/23 1120) Vital Signs (24h Range):  Temp:  [98.5 °F (36.9 °C)-99.5 °F (37.5 °C)] 98.9 °F (37.2 °C)  Pulse:  [73-81] 81  Resp:  [16-20] 18  SpO2:  [92 %-99 %] 99 %  BP: (130-157)/(63-70) 150/70     Weight: 50.8 kg (112 lb)  Body mass index is 22.61 kg/m².    Estimated Creatinine Clearance: 40.8 mL/min (based on SCr of 1.2 mg/dL).     Physical Exam  Vitals reviewed.   Constitutional:       General: She is not in acute distress.     Appearance: Normal appearance. She is not ill-appearing.   HENT:      Head: Normocephalic.      Nose: Nose normal.      Mouth/Throat:      Mouth: Mucous membranes are moist.      Pharynx: Oropharynx is clear.   Eyes:      General:         Right eye: No discharge.         Left eye: No discharge.      Conjunctiva/sclera: Conjunctivae normal.   Cardiovascular:      Rate and Rhythm: Normal rate and regular rhythm.      Pulses: Normal pulses.      Heart sounds: Normal heart sounds. No murmur heard.  Pulmonary:      Effort: Pulmonary effort is normal. No respiratory distress.      Breath sounds: Normal breath sounds. No stridor.   Abdominal:      General: Abdomen is flat. There is no distension.      Palpations: Abdomen is soft.      Tenderness: There is no abdominal tenderness.   Musculoskeletal:         General: Normal range of motion.      Cervical back: Normal range of motion.      Right lower leg: No edema.      Left lower leg: No edema.   Skin:     General: Skin is warm.      Findings: No erythema or rash.   Neurological:       General: No focal deficit present.      Mental Status: She is alert and oriented to person, place, and time.      Motor: No weakness.      Gait: Gait normal.   Psychiatric:         Mood and Affect: Mood normal.         Behavior: Behavior normal.         Thought Content: Thought content normal.         Judgment: Judgment normal.          Significant Labs: CBC:   Recent Labs   Lab 12/22/23  0516 12/23/23  0502   WBC 9.45 4.81   HGB 9.8* 9.5*   HCT 29.8* 28.9*   PLT 52* 61*     CMP:   Recent Labs   Lab 12/22/23  0516 12/23/23  0502   * 138   K 4.8 4.4    109   CO2 19* 20*   GLU 90 74   BUN 49* 41*   CREATININE 1.5* 1.2   CALCIUM 8.6* 8.6*   PROT 6.3 6.0   ALBUMIN 2.8* 2.6*   BILITOT 3.5* 3.5*   ALKPHOS 487* 447*   AST 75* 57*   ALT 60* 53*   ANIONGAP 10 9     Microbiology Results (last 7 days)       Procedure Component Value Units Date/Time    Blood culture x two cultures. Draw prior to antibiotics. [[id number]]  (Abnormal) Collected: 12/21/23 0807    Order Status: Completed Specimen: Blood from Peripheral, Forearm, Right Updated: 12/23/23 1348     Blood Culture, Routine Gram stain tommie bottle: Gram positive cocci in chains resembling Strep      Results called to and read back by: Daniela Ott RN      Gram stain aer bottle: Gram positive cocci in chains resembling Strep      Positive results previously called 12/23/2023  13:48      VIRIDANS STREPTOCOCCUS GROUP  Susceptibility testing not routinely performed      Narrative:      Aerobic and anaerobic    Blood culture [[id number]] Collected: 12/23/23 0502    Order Status: Completed Specimen: Blood from Peripheral, Right Arm Updated: 12/23/23 1315     Blood Culture, Routine No Growth to date    Blood culture [[id number]] Collected: 12/23/23 0504    Order Status: Completed Specimen: Blood from Antecubital, Right Arm Updated: 12/23/23 1315     Blood Culture, Routine No Growth to date    Blood culture x two cultures. Draw prior to antibiotics. [[id number]]  Collected: 12/21/23 0749    Order Status: Completed Specimen: Blood from Peripheral, Antecubital, Right Updated: 12/23/23 0812     Blood Culture, Routine No Growth to date      No Growth to date      No Growth to date    Narrative:      Aerobic and anaerobic    Blood culture [[id number]]     Order Status: Canceled Specimen: Blood     Blood culture [[id number]]     Order Status: Canceled Specimen: Blood     Rapid Organism ID by PCR (from Blood culture) [[id number]]  (Abnormal) Collected: 12/21/23 0807    Order Status: Completed Updated: 12/22/23 0749     Enterococcus faecalis Not Detected     Enterococcus faecium Not Detected     Listeria monocytogenes Not Detected     Staphylococcus spp. Not Detected     Staphylococcus aureus Not Detected     Staphylococcus epidermidis Not Detected     Staphylococcus lugdunensis Not Detected     Streptococcus species Detected     Streptococcus agalactiae Not Detected     Streptococcus pneumoniae Not Detected     Streptococcus pyogenes Not Detected     Acinetobacter calcoaceticus/baumannii complex Not Detected     Bacteroides fragilis Not Detected     Enterobacterales Not Detected     Enterobacter cloacae complex Not Detected     Escherichia coli Not Detected     Klebsiella aerogenes Not Detected     Klebsiella oxytoca Not Detected     Klebsiella pneumoniae group Not Detected     Proteus Not Detected     Salmonella sp Not Detected     Serratia marcescens Not Detected     Haemophilus influenzae Not Detected     Neisseria meningtidis Not Detected     Pseudomonas aeruginosa Not Detected     Stenotrophomonas maltophilia Not Detected     Candida albicans Not Detected     Candida auris Not Detected     Candida glabrata Not Detected     Candida krusei Not Detected     Candida parapsilosis Not Detected     Candida tropicalis Not Detected     Cryptococcus neoformans/gattii Not Detected     CTX-M (ESBL ) Test Not Applicable     IMP (Carbapenem resistant) Test Not Applicable     KPC  resistance gene (Carbapenem resistant) Test Not Applicable     mcr-1  Test Not Applicable     mec A/C  Test Not Applicable     mec A/C and MREJ (MRSA) gene Test Not Applicable     NDM (Carbapenem resistant) Test Not Applicable     OXA-48-like (Carbapenem resistant) Test Not Applicable     van A/B (VRE gene) Test Not Applicable     VIM (Carbapenem resistant) Test Not Applicable    Narrative:      Aerobic and anaerobic    (rule out SBP) Aerobic culture [[id number]]     Order Status: Canceled Specimen: Ascites     (rule out SBP) Culture, Anaerobic [[id number]]     Order Status: Canceled Specimen: Ascites     (rule out SBP) Gram stain [[id number]]     Order Status: Canceled Specimen: Ascites     Clostridium difficile EIA [[id number]]     Order Status: Canceled Specimen: Stool             Significant Imaging: I have reviewed all pertinent imaging results/findings within the past 24 hours.

## 2023-12-23 NOTE — ANESTHESIA POSTPROCEDURE EVALUATION
Anesthesia Post Evaluation    Patient: Elda Hernandez    Procedure(s) Performed: Procedure(s) (LRB):  EGD (ESOPHAGOGASTRODUODENOSCOPY) (N/A)    Final Anesthesia Type: general      Patient location during evaluation: PACU  Patient participation: Yes- Able to Participate  Level of consciousness: awake and alert  Post-procedure vital signs: reviewed and stable  Pain management: adequate  Airway patency: patent    PONV status at discharge: No PONV  Anesthetic complications: no      Cardiovascular status: blood pressure returned to baseline  Respiratory status: unassisted  Hydration status: euvolemic  Follow-up not needed.              Vitals Value Taken Time   /70 12/23/23 0752   Temp 37.2 °C (98.9 °F) 12/23/23 0752   Pulse 78 12/23/23 0752   Resp 18 12/23/23 0752   SpO2 98 % 12/23/23 0752         Event Time   Out of Recovery 11:20:00         Pain/Ector Score: Pain Rating Prior to Med Admin: 7 (12/23/2023  6:27 AM)  Pain Rating Post Med Admin: 4 (12/23/2023  2:47 AM)  Ector Score: 10 (12/22/2023 10:20 AM)

## 2023-12-23 NOTE — ASSESSMENT & PLAN NOTE
53 year old female with above history presenting with esophageal ulceration and possibly contained perforation following several days of vomiting at home. If there was a full thickness perforation it is now contained. Not amendable to stenting per GI given length of ulceration. Ct with oral contrast and esophagram both negative for active extravasation of contrast. She does have small pleural effusion on the right.     - No indication for surgical intervention given lack of clear perforation on esophagram, CT w/ oral contrast.  - Okay to trial thin, clear liquids   - If nausea occurs, maintain patient with NPO status and treat symptoms to prevent further retching and/or emesis   - BID PPI, sucralfate  - Nutrition consult given malnutrition and need for liquid diet

## 2023-12-23 NOTE — PROGRESS NOTES
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 Was found to have GPCs in 1/4 bottles, BCID + strep spp. s/p EGD 12/22, procedure note with 9 cm mucosal tear. No clear perforation on imaging, awaiting additional studies. Suspect this is the source of bacteremia.     Recommendations  - continue vanc and ceftriaxone  - repeat blood cultures NGTD  - follow up imaging and any surgical plans        Anticipated Disposition: TBD    Thank you for your consult. I will follow-up with patient. Please contact us if you have any additional questions.    Juliann Borrego DO  Transplant Infectious Disease    Time: 50 minutes   50% of time spent on face-to-face counseling and coordination of care. Counseling included review of test results, diagnosis, and treatment plan with patient and/or family.        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 Pt has had frequent hospitalizations, most recently in March of 2023, and since has been on liquid diet. Pt reports she is unable to tolerate solid food as it is hard for her to swallow and has issue with the taste. Pt states her diet consists of 3 boost drinks and 1  smoothie a day. Pt reports abdominal pain, nausea. Denies sick contacts. Reports compliance with medication.     Since admission, pt without leukocytosis. Pro naveed 1.7. Hep A antibody negative. Hep b screen negative. Hep C screen negative. UA without infectious concerns. Blood cultures + GPC resembling strep in 1/4 bottle, BCID + strep sp. Repeat blood cultures pending. Chest xray negative for acute findings. CT head negative for acute findings. CT AP noting partial obstruction in esophagus, cirrhotic liver morphology, splenomegaly, trace ascites, colitis of ascending colon, gastritis, mild anasarca, and small right pleural effusion. Liver ultrasound noting elevated intraparenchymal resistive indices, noting rejection fluid overload vs chronic liver disease.     IR following and attempted paracentesis, but noting there wasn't a safe window of fluid and therefore procedure was cancelled. Pt is s/p endoscopy today, noting 9 cm mucosal tear which was biopsied, noting esophageal stenosis near EGJ with esophagitis, pyloric stenosis. Gen surgery following and planning EGD on 12/23, cf severe esophageal inflammation or esophageal stricture, and noting significant distension of lower esophagus with fluid and debris, as well as hyperdense structure at GEJ.     Pt is currently afebrile, HDS. On vanc and ceftriaxone.     GI and hepatology are following. ID was consulted for antibiotic recs.   Interval History: no events, seen by thoracic surgery for possible esophageal perforation. No clear leak on esophagram. Recommending CT w/ oral contrast. Blood cx + viridans strep, likely related to esophageal pathology    Review of Systems   All other systems reviewed and are negative.    Objective:     Vital Signs (Most Recent):  Temp: 98.9 °F (37.2 °C) (12/23/23 0752)  Pulse: 81 (12/23/23 1120)  Resp: 18 (12/23/23 1120)  BP: (!) 150/70 (12/23/23 1120)  SpO2: 99 % (12/23/23 1120) Vital Signs (24h Range):  Temp:  [98.5 °F (36.9 °C)-99.5  °F (37.5 °C)] 98.9 °F (37.2 °C)  Pulse:  [73-81] 81  Resp:  [16-20] 18  SpO2:  [92 %-99 %] 99 %  BP: (130-157)/(63-70) 150/70     Weight: 50.8 kg (112 lb)  Body mass index is 22.61 kg/m².    Estimated Creatinine Clearance: 40.8 mL/min (based on SCr of 1.2 mg/dL).     Physical Exam  Vitals reviewed.   Constitutional:       General: She is not in acute distress.     Appearance: Normal appearance. She is not ill-appearing.   HENT:      Head: Normocephalic.      Nose: Nose normal.      Mouth/Throat:      Mouth: Mucous membranes are moist.      Pharynx: Oropharynx is clear.   Eyes:      General:         Right eye: No discharge.         Left eye: No discharge.      Conjunctiva/sclera: Conjunctivae normal.   Cardiovascular:      Rate and Rhythm: Normal rate and regular rhythm.      Pulses: Normal pulses.      Heart sounds: Normal heart sounds. No murmur heard.  Pulmonary:      Effort: Pulmonary effort is normal. No respiratory distress.      Breath sounds: Normal breath sounds. No stridor.   Abdominal:      General: Abdomen is flat. There is no distension.      Palpations: Abdomen is soft.      Tenderness: There is no abdominal tenderness.   Musculoskeletal:         General: Normal range of motion.      Cervical back: Normal range of motion.      Right lower leg: No edema.      Left lower leg: No edema.   Skin:     General: Skin is warm.      Findings: No erythema or rash.   Neurological:      General: No focal deficit present.      Mental Status: She is alert and oriented to person, place, and time.      Motor: No weakness.      Gait: Gait normal.   Psychiatric:         Mood and Affect: Mood normal.         Behavior: Behavior normal.         Thought Content: Thought content normal.         Judgment: Judgment normal.          Significant Labs: CBC:   Recent Labs   Lab 12/22/23  0516 12/23/23  0502   WBC 9.45 4.81   HGB 9.8* 9.5*   HCT 29.8* 28.9*   PLT 52* 61*     CMP:   Recent Labs   Lab 12/22/23  0516 12/23/23  0502   NA  135* 138   K 4.8 4.4    109   CO2 19* 20*   GLU 90 74   BUN 49* 41*   CREATININE 1.5* 1.2   CALCIUM 8.6* 8.6*   PROT 6.3 6.0   ALBUMIN 2.8* 2.6*   BILITOT 3.5* 3.5*   ALKPHOS 487* 447*   AST 75* 57*   ALT 60* 53*   ANIONGAP 10 9     Microbiology Results (last 7 days)       Procedure Component Value Units Date/Time    Blood culture x two cultures. Draw prior to antibiotics. [[id number]]  (Abnormal) Collected: 12/21/23 0807    Order Status: Completed Specimen: Blood from Peripheral, Forearm, Right Updated: 12/23/23 1348     Blood Culture, Routine Gram stain tommie bottle: Gram positive cocci in chains resembling Strep      Results called to and read back by: Daniela Ott RN      Gram stain aer bottle: Gram positive cocci in chains resembling Strep      Positive results previously called 12/23/2023  13:48      VIRIDANS STREPTOCOCCUS GROUP  Susceptibility testing not routinely performed      Narrative:      Aerobic and anaerobic    Blood culture [[id number]] Collected: 12/23/23 0502    Order Status: Completed Specimen: Blood from Peripheral, Right Arm Updated: 12/23/23 1315     Blood Culture, Routine No Growth to date    Blood culture [[id number]] Collected: 12/23/23 0504    Order Status: Completed Specimen: Blood from Antecubital, Right Arm Updated: 12/23/23 1315     Blood Culture, Routine No Growth to date    Blood culture x two cultures. Draw prior to antibiotics. [[id number]] Collected: 12/21/23 0749    Order Status: Completed Specimen: Blood from Peripheral, Antecubital, Right Updated: 12/23/23 0812     Blood Culture, Routine No Growth to date      No Growth to date      No Growth to date    Narrative:      Aerobic and anaerobic    Blood culture [[id number]]     Order Status: Canceled Specimen: Blood     Blood culture [[id number]]     Order Status: Canceled Specimen: Blood     Rapid Organism ID by PCR (from Blood culture) [[id number]]  (Abnormal) Collected: 12/21/23 0807    Order Status: Completed Updated:  12/22/23 0749     Enterococcus faecalis Not Detected     Enterococcus faecium Not Detected     Listeria monocytogenes Not Detected     Staphylococcus spp. Not Detected     Staphylococcus aureus Not Detected     Staphylococcus epidermidis Not Detected     Staphylococcus lugdunensis Not Detected     Streptococcus species Detected     Streptococcus agalactiae Not Detected     Streptococcus pneumoniae Not Detected     Streptococcus pyogenes Not Detected     Acinetobacter calcoaceticus/baumannii complex Not Detected     Bacteroides fragilis Not Detected     Enterobacterales Not Detected     Enterobacter cloacae complex Not Detected     Escherichia coli Not Detected     Klebsiella aerogenes Not Detected     Klebsiella oxytoca Not Detected     Klebsiella pneumoniae group Not Detected     Proteus Not Detected     Salmonella sp Not Detected     Serratia marcescens Not Detected     Haemophilus influenzae Not Detected     Neisseria meningtidis Not Detected     Pseudomonas aeruginosa Not Detected     Stenotrophomonas maltophilia Not Detected     Candida albicans Not Detected     Candida auris Not Detected     Candida glabrata Not Detected     Candida krusei Not Detected     Candida parapsilosis Not Detected     Candida tropicalis Not Detected     Cryptococcus neoformans/gattii Not Detected     CTX-M (ESBL ) Test Not Applicable     IMP (Carbapenem resistant) Test Not Applicable     KPC resistance gene (Carbapenem resistant) Test Not Applicable     mcr-1  Test Not Applicable     mec A/C  Test Not Applicable     mec A/C and MREJ (MRSA) gene Test Not Applicable     NDM (Carbapenem resistant) Test Not Applicable     OXA-48-like (Carbapenem resistant) Test Not Applicable     van A/B (VRE gene) Test Not Applicable     VIM (Carbapenem resistant) Test Not Applicable    Narrative:      Aerobic and anaerobic    (rule out SBP) Aerobic culture [[id number]]     Order Status: Canceled Specimen: Ascites     (rule out SBP) Culture,  Anaerobic [[id number]]     Order Status: Canceled Specimen: Ascites     (rule out SBP) Gram stain [[id number]]     Order Status: Canceled Specimen: Ascites     Clostridium difficile EIA [[id number]]     Order Status: Canceled Specimen: Stool             Significant Imaging: I have reviewed all pertinent imaging results/findings within the past 24 hours.

## 2023-12-23 NOTE — PLAN OF CARE
Problem: Adult Inpatient Plan of Care  Goal: Plan of Care Review  Outcome: Ongoing, Progressing  Flowsheets (Taken 12/23/2023 0542)  Plan of Care Reviewed With: patient  Goal: Optimal Comfort and Wellbeing  Outcome: Ongoing, Progressing  Intervention: Provide Person-Centered Care  Flowsheets (Taken 12/23/2023 0542)  Trust Relationship/Rapport:   care explained   choices provided   emotional support provided   empathic listening provided   questions answered   questions encouraged   reassurance provided   thoughts/feelings acknowledged     Problem: Infection  Goal: Absence of Infection Signs and Symptoms  Outcome: Ongoing, Progressing  Intervention: Prevent or Manage Infection  Flowsheets (Taken 12/23/2023 0542)  Isolation Precautions: precautions maintained     Problem: Pain Acute  Goal: Acceptable Pain Control and Functional Ability  Outcome: Ongoing, Not Progressing  Intervention: Develop Pain Management Plan  Flowsheets (Taken 12/23/2023 0542)  Pain Management Interventions:   around-the-clock dosing utilized   awakened for pain meds per patient request   medication offered   pain management plan reviewed with patient/caregiver    PRN pain and nausea medication administered. Patient remains NPO, all PO meds held. Pt resting in bed.

## 2023-12-23 NOTE — TREATMENT PLAN
GI Treatment Plan    Elda Hernandez is a 53 y.o. female admitted to hospital 12/21/2023 (Hospital Day: 3) due to Abdominal pain.     Interval History  S/p EGD on 12/22 with deep 9 cm linear mucosal tear with ulceration in the middle and lower esophagus with stenosis at the EGJ with esophagitis. CT chest abdomen and pelvic with oral contrast done that showed extraluminal air foci concerning for leak, distal esophageal outpouching which may represent ulcers vs contained leak. Radiology recommended esophagram, pending.     Objective  Temp:  [97.9 °F (36.6 °C)-99.5 °F (37.5 °C)] 98.9 °F (37.2 °C) (12/23 0752)  Pulse:  [69-84] 78 (12/23 0752)  BP: (105-157)/(52-70) 157/70 (12/23 0752)  Resp:  [16-20] 18 (12/23 0752)  SpO2:  [92 %-98 %] 98 % (12/23 0752)    Laboratory    Recent Labs   Lab 12/21/23  0749 12/22/23  0516 12/23/23  0502   HGB 11.2* 9.8* 9.5*       Lab Results   Component Value Date    WBC 4.81 12/23/2023    HGB 9.5 (L) 12/23/2023    HCT 28.9 (L) 12/23/2023    MCV 94 12/23/2023    PLT 61 (L) 12/23/2023       Lab Results   Component Value Date     12/23/2023    K 4.4 12/23/2023     12/23/2023    CO2 20 (L) 12/23/2023    BUN 41 (H) 12/23/2023    CREATININE 1.2 12/23/2023    CALCIUM 8.6 (L) 12/23/2023    ANIONGAP 9 12/23/2023    ESTGFRAFRICA >60.0 05/28/2022    EGFRNONAA >60.0 05/28/2022       Lab Results   Component Value Date    ALT 53 (H) 12/23/2023    AST 57 (H) 12/23/2023     (H) 04/07/2018    ALKPHOS 447 (H) 12/23/2023    BILITOT 3.5 (H) 12/23/2023       Lab Results   Component Value Date    INR 1.3 (H) 12/23/2023    INR 1.3 (H) 12/22/2023    INR 1.1 12/21/2023   53 year old female s/p liver transplantation in 2002 for Von Gierke disease and post-transplant course complicated by biopsy-proven chronic allograft rejection, as well as 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 Underwent EGD on 12/22 showing a large esophageal tear  with ulceration. Follow up CT with oral contrast concerning for leak, distal esophageal outpouching which may represent ulcers vs contained leak. Esophagram recommended and currently pending.    Plan  - Unfortunately due to the size of the mucosal tear, it would not be amenable to stent placement. Would discuss further with surgery.   - Plan of care was discussed with primary team.  - We are signing-off. Please call with any questions.    Thank you for involving us in the care of Elda Hernandez. Please call with any additional questions, concerns or changes in the patient's clinical status.    Maegan Aleman MD  Gastroenterology

## 2023-12-23 NOTE — SUBJECTIVE & OBJECTIVE
Interval History: No new concerns, reports improvement in N/V, still with abd pain controlled with pain meds.     Esophagram done and neg for leak/perforation. Clears started.       Review of Systems   Constitutional:  Negative for fever.   Respiratory:  Negative for shortness of breath.    Gastrointestinal:  Positive for abdominal pain. Negative for nausea and vomiting.   Psychiatric/Behavioral:  Negative for confusion.      Objective:     Vital Signs (Most Recent):  Temp: 98.9 °F (37.2 °C) (12/23/23 0752)  Pulse: 81 (12/23/23 1120)  Resp: 18 (12/23/23 1120)  BP: (!) 150/70 (12/23/23 1120)  SpO2: 99 % (12/23/23 1120) Vital Signs (24h Range):  Temp:  [98.5 °F (36.9 °C)-99.5 °F (37.5 °C)] 98.9 °F (37.2 °C)  Pulse:  [73-81] 81  Resp:  [16-20] 18  SpO2:  [92 %-99 %] 99 %  BP: (130-157)/(63-70) 150/70     Weight: 50.8 kg (112 lb)  Body mass index is 22.61 kg/m².    Intake/Output Summary (Last 24 hours) at 12/23/2023 1433  Last data filed at 12/23/2023 1248  Gross per 24 hour   Intake --   Output 1300 ml   Net -1300 ml           Physical Exam  Vitals and nursing note reviewed.   Constitutional:       General: She is not in acute distress.     Appearance: Normal appearance. She is not ill-appearing.   HENT:      Head: Normocephalic and atraumatic.      Right Ear: External ear normal.      Left Ear: External ear normal.      Nose: Nose normal.      Mouth/Throat:      Mouth: Mucous membranes are moist.      Pharynx: Oropharynx is clear.   Eyes:      General: Scleral icterus present.      Extraocular Movements: Extraocular movements intact.      Conjunctiva/sclera: Conjunctivae normal.      Pupils: Pupils are equal, round, and reactive to light.   Cardiovascular:      Rate and Rhythm: Normal rate and regular rhythm.      Pulses: Normal pulses.      Heart sounds: Murmur heard.   Pulmonary:      Effort: Pulmonary effort is normal.      Breath sounds: Normal breath sounds.   Abdominal:      General: Bowel sounds are normal.  There is distension.      Palpations: Abdomen is soft.      Tenderness: There is abdominal tenderness.   Musculoskeletal:         General: Normal range of motion.      Cervical back: Normal range of motion and neck supple.      Right lower leg: No edema.      Left lower leg: No edema.   Skin:     General: Skin is warm and dry.      Coloration: Skin is jaundiced.   Neurological:      General: No focal deficit present.      Mental Status: She is alert and oriented to person, place, and time.   Psychiatric:         Mood and Affect: Mood normal.         Behavior: Behavior normal.             Significant Labs: All pertinent labs within the past 24 hours have been reviewed.    Significant Imaging: I have reviewed all pertinent imaging results/findings within the past 24 hours.

## 2023-12-23 NOTE — PROGRESS NOTES
Pharmacokinetic Assessment Follow Up: IV Vancomycin    Vancomycin serum concentration assessment(s):  - The vancomycin random level, drawn at ~ 18.5 hours, was 11.2 mcg/mL which is below the goal range of 15-20 mcg/mL.   - The level is appropriate for re-dosing.   - Renal function is improving, but not yet to baseline at this time.     Vancomycin Regimen Plan:  - Will re-dose with 750 mg (15 mg/kg) x 1 and continue to dose by levels.   - Obtain a random level on 12/24 with AM labs if therapy continues.   - Monitor for signs and symptoms of nephrotoxicity and infusion reactions.     Drug levels (last 3 results):  Recent Labs   Lab Result Units 12/23/23  0502   Vancomycin, Random ug/mL 11.2       Pharmacy will continue to follow and monitor vancomycin.    Please contact pharmacy at extension [phone] for questions regarding this assessment.    Thank you for the consult,   Shira Moyer       Patient brief summary:  Elda Hernandez is a 53 y.o. female initiated on antimicrobial therapy with IV Vancomycin for treatment of bacteremia    The patient's current regimen is dose by levels due to acute change in renal function.     Drug Allergies:   Review of patient's allergies indicates:   Allergen Reactions    Codeine Itching     Other reaction(s): Itching    Lipitor [atorvastatin] Other (See Comments)     Other reaction(s): Muscle pain  Muscle cranmps    Morphine Itching     Other reaction(s): nausea and vomiting     Zoloft [sertraline] Other (See Comments)     Tremors/muscle spasms       Actual Body Weight:   50.8 kg    Renal Function:   Estimated Creatinine Clearance: 40.8 mL/min (based on SCr of 1.2 mg/dL).,     CBC (last 72 hours):  Recent Labs   Lab Result Units 12/21/23  0749 12/22/23  0516 12/23/23  0502   WBC K/uL 5.29 9.45 4.81   Hemoglobin g/dL 11.2* 9.8* 9.5*   Hematocrit % 33.1* 29.8* 28.9*   Platelets K/uL 64* 52* 61*   Gran % % 82.6* 83.4* 69.9   Lymph % % 8.5* 5.2* 11.0*   Mono % % 6.2 10.6 15.0    Eosinophil % % 1.5 0.1 3.1   Basophil % % 0.8 0.3 0.6   Differential Method  Automated Automated Automated       Metabolic Panel (last 72 hours):  Recent Labs   Lab Result Units 12/21/23  0749 12/21/23  1723 12/22/23  0516 12/23/23  0502   Sodium mmol/L 135*  --  135* 138   Potassium mmol/L 3.9  --  4.8 4.4   Chloride mmol/L 105  --  106 109   CO2 mmol/L 19*  --  19* 20*   Glucose mg/dL 117*  --  90 74   Glucose, UA   --  Negative  --   --    BUN mg/dL 40*  --  49* 41*   Creatinine mg/dL 1.0  --  1.5* 1.2   Albumin g/dL 3.3*  --  2.8* 2.6*   Total Bilirubin mg/dL 4.2*  --  3.5* 3.5*   Alkaline Phosphatase U/L 631*  --  487* 447*   AST U/L 103*  --  75* 57*   ALT U/L 73*  --  60* 53*   Magnesium mg/dL 2.2  --   --   --    Phosphorus mg/dL 4.3  --   --   --        Vancomycin Administrations:  vancomycin given in the last 96 hours                     vancomycin (VANCOCIN) 1,000 mg in dextrose 5 % (D5W) 250 mL IVPB (Vial-Mate) (mg) 1,000 mg New Bag 12/22/23 1042                    Microbiologic Results:  Microbiology Results (last 7 days)       Procedure Component Value Units Date/Time    Blood culture [[id number]] Collected: 12/23/23 0502    Order Status: Sent Specimen: Blood from Peripheral, Right Arm Updated: 12/23/23 0554    Blood culture [[id number]] Collected: 12/23/23 0504    Order Status: Sent Specimen: Blood from Antecubital, Right Arm Updated: 12/23/23 0554    Blood culture [[id number]]     Order Status: No result Specimen: Blood     Blood culture [[id number]]     Order Status: No result Specimen: Blood     Blood culture x two cultures. Draw prior to antibiotics. [[id number]] Collected: 12/21/23 0749    Order Status: Completed Specimen: Blood from Peripheral, Antecubital, Right Updated: 12/22/23 0812     Blood Culture, Routine No Growth to date      No Growth to date    Narrative:      Aerobic and anaerobic    Rapid Organism ID by PCR (from Blood culture) [[id number]]  (Abnormal) Collected: 12/21/23 0807     Order Status: Completed Updated: 12/22/23 0749     Enterococcus faecalis Not Detected     Enterococcus faecium Not Detected     Listeria monocytogenes Not Detected     Staphylococcus spp. Not Detected     Staphylococcus aureus Not Detected     Staphylococcus epidermidis Not Detected     Staphylococcus lugdunensis Not Detected     Streptococcus species Detected     Streptococcus agalactiae Not Detected     Streptococcus pneumoniae Not Detected     Streptococcus pyogenes Not Detected     Acinetobacter calcoaceticus/baumannii complex Not Detected     Bacteroides fragilis Not Detected     Enterobacterales Not Detected     Enterobacter cloacae complex Not Detected     Escherichia coli Not Detected     Klebsiella aerogenes Not Detected     Klebsiella oxytoca Not Detected     Klebsiella pneumoniae group Not Detected     Proteus Not Detected     Salmonella sp Not Detected     Serratia marcescens Not Detected     Haemophilus influenzae Not Detected     Neisseria meningtidis Not Detected     Pseudomonas aeruginosa Not Detected     Stenotrophomonas maltophilia Not Detected     Candida albicans Not Detected     Candida auris Not Detected     Candida glabrata Not Detected     Candida krusei Not Detected     Candida parapsilosis Not Detected     Candida tropicalis Not Detected     Cryptococcus neoformans/gattii Not Detected     CTX-M (ESBL ) Test Not Applicable     IMP (Carbapenem resistant) Test Not Applicable     KPC resistance gene (Carbapenem resistant) Test Not Applicable     mcr-1  Test Not Applicable     mec A/C  Test Not Applicable     mec A/C and MREJ (MRSA) gene Test Not Applicable     NDM (Carbapenem resistant) Test Not Applicable     OXA-48-like (Carbapenem resistant) Test Not Applicable     van A/B (VRE gene) Test Not Applicable     VIM (Carbapenem resistant) Test Not Applicable    Narrative:      Aerobic and anaerobic    Blood culture x two cultures. Draw prior to antibiotics. [[id number]] Collected:  12/21/23 0807    Order Status: Completed Specimen: Blood from Peripheral, Forearm, Right Updated: 12/22/23 0559     Blood Culture, Routine Gram stain tommie bottle: Gram positive cocci in chains resembling Strep      Results called to and read back by: Daniela Ott RN    Narrative:      Aerobic and anaerobic    (rule out SBP) Aerobic culture [[id number]]     Order Status: No result Specimen: Ascites     (rule out SBP) Culture, Anaerobic [[id number]]     Order Status: No result Specimen: Ascites     (rule out SBP) Gram stain [[id number]]     Order Status: No result Specimen: Ascites     Clostridium difficile EIA [[id number]]     Order Status: Canceled Specimen: Stool

## 2023-12-24 VITALS
TEMPERATURE: 99 F | RESPIRATION RATE: 17 BRPM | HEART RATE: 76 BPM | WEIGHT: 112 LBS | SYSTOLIC BLOOD PRESSURE: 157 MMHG | DIASTOLIC BLOOD PRESSURE: 70 MMHG | BODY MASS INDEX: 22.58 KG/M2 | HEIGHT: 59 IN | OXYGEN SATURATION: 96 %

## 2023-12-24 LAB
ALBUMIN SERPL BCP-MCNC: 2.6 G/DL (ref 3.5–5.2)
ALP SERPL-CCNC: 478 U/L (ref 55–135)
ALT SERPL W/O P-5'-P-CCNC: 52 U/L (ref 10–44)
ANION GAP SERPL CALC-SCNC: 8 MMOL/L (ref 8–16)
AST SERPL-CCNC: 62 U/L (ref 10–40)
BASOPHILS # BLD AUTO: 0.03 K/UL (ref 0–0.2)
BASOPHILS NFR BLD: 1 % (ref 0–1.9)
BILIRUB SERPL-MCNC: 3.4 MG/DL (ref 0.1–1)
BUN SERPL-MCNC: 25 MG/DL (ref 6–20)
CALCIUM SERPL-MCNC: 8.7 MG/DL (ref 8.7–10.5)
CHLORIDE SERPL-SCNC: 106 MMOL/L (ref 95–110)
CO2 SERPL-SCNC: 22 MMOL/L (ref 23–29)
CREAT SERPL-MCNC: 1.1 MG/DL (ref 0.5–1.4)
DIFFERENTIAL METHOD BLD: ABNORMAL
EOSINOPHIL # BLD AUTO: 0.2 K/UL (ref 0–0.5)
EOSINOPHIL NFR BLD: 5.2 % (ref 0–8)
ERYTHROCYTE [DISTWIDTH] IN BLOOD BY AUTOMATED COUNT: 14.3 % (ref 11.5–14.5)
EST. GFR  (NO RACE VARIABLE): 59.3 ML/MIN/1.73 M^2
GLUCOSE SERPL-MCNC: 86 MG/DL (ref 70–110)
HCT VFR BLD AUTO: 27.2 % (ref 37–48.5)
HGB BLD-MCNC: 9 G/DL (ref 12–16)
IMM GRANULOCYTES # BLD AUTO: 0.01 K/UL (ref 0–0.04)
IMM GRANULOCYTES NFR BLD AUTO: 0.3 % (ref 0–0.5)
INR PPP: 1.3 (ref 0.8–1.2)
LYMPHOCYTES # BLD AUTO: 0.5 K/UL (ref 1–4.8)
LYMPHOCYTES NFR BLD: 15.6 % (ref 18–48)
MAGNESIUM SERPL-MCNC: 2 MG/DL (ref 1.6–2.6)
MCH RBC QN AUTO: 30.3 PG (ref 27–31)
MCHC RBC AUTO-ENTMCNC: 33.1 G/DL (ref 32–36)
MCV RBC AUTO: 92 FL (ref 82–98)
MONOCYTES # BLD AUTO: 0.6 K/UL (ref 0.3–1)
MONOCYTES NFR BLD: 18.2 % (ref 4–15)
NEUTROPHILS # BLD AUTO: 1.8 K/UL (ref 1.8–7.7)
NEUTROPHILS NFR BLD: 59.7 % (ref 38–73)
NRBC BLD-RTO: 0 /100 WBC
PLATELET # BLD AUTO: 83 K/UL (ref 150–450)
PMV BLD AUTO: 12.4 FL (ref 9.2–12.9)
POTASSIUM SERPL-SCNC: 4.1 MMOL/L (ref 3.5–5.1)
PROT SERPL-MCNC: 6.1 G/DL (ref 6–8.4)
PROTHROMBIN TIME: 13.3 SEC (ref 9–12.5)
RBC # BLD AUTO: 2.97 M/UL (ref 4–5.4)
SODIUM SERPL-SCNC: 136 MMOL/L (ref 136–145)
TACROLIMUS BLD-MCNC: 7 NG/ML (ref 5–15)
VANCOMYCIN SERPL-MCNC: 15 UG/ML
WBC # BLD AUTO: 3.08 K/UL (ref 3.9–12.7)

## 2023-12-24 PROCEDURE — 80197 ASSAY OF TACROLIMUS: CPT | Performed by: STUDENT IN AN ORGANIZED HEALTH CARE EDUCATION/TRAINING PROGRAM

## 2023-12-24 PROCEDURE — 83735 ASSAY OF MAGNESIUM: CPT | Performed by: STUDENT IN AN ORGANIZED HEALTH CARE EDUCATION/TRAINING PROGRAM

## 2023-12-24 PROCEDURE — G0378 HOSPITAL OBSERVATION PER HR: HCPCS

## 2023-12-24 PROCEDURE — 63600175 PHARM REV CODE 636 W HCPCS: Performed by: STUDENT IN AN ORGANIZED HEALTH CARE EDUCATION/TRAINING PROGRAM

## 2023-12-24 PROCEDURE — 25000003 PHARM REV CODE 250: Performed by: STUDENT IN AN ORGANIZED HEALTH CARE EDUCATION/TRAINING PROGRAM

## 2023-12-24 PROCEDURE — 36415 COLL VENOUS BLD VENIPUNCTURE: CPT | Performed by: STUDENT IN AN ORGANIZED HEALTH CARE EDUCATION/TRAINING PROGRAM

## 2023-12-24 PROCEDURE — C9113 INJ PANTOPRAZOLE SODIUM, VIA: HCPCS | Performed by: STUDENT IN AN ORGANIZED HEALTH CARE EDUCATION/TRAINING PROGRAM

## 2023-12-24 PROCEDURE — 96366 THER/PROPH/DIAG IV INF ADDON: CPT

## 2023-12-24 PROCEDURE — 99215 OFFICE O/P EST HI 40 MIN: CPT | Mod: ,,, | Performed by: INTERNAL MEDICINE

## 2023-12-24 PROCEDURE — 85610 PROTHROMBIN TIME: CPT | Performed by: STUDENT IN AN ORGANIZED HEALTH CARE EDUCATION/TRAINING PROGRAM

## 2023-12-24 PROCEDURE — 80202 ASSAY OF VANCOMYCIN: CPT | Performed by: STUDENT IN AN ORGANIZED HEALTH CARE EDUCATION/TRAINING PROGRAM

## 2023-12-24 PROCEDURE — 63600175 PHARM REV CODE 636 W HCPCS: Performed by: HOSPITALIST

## 2023-12-24 PROCEDURE — 80053 COMPREHEN METABOLIC PANEL: CPT | Performed by: STUDENT IN AN ORGANIZED HEALTH CARE EDUCATION/TRAINING PROGRAM

## 2023-12-24 PROCEDURE — 85025 COMPLETE CBC W/AUTO DIFF WBC: CPT | Performed by: STUDENT IN AN ORGANIZED HEALTH CARE EDUCATION/TRAINING PROGRAM

## 2023-12-24 PROCEDURE — 96376 TX/PRO/DX INJ SAME DRUG ADON: CPT

## 2023-12-24 RX ORDER — ONDANSETRON 4 MG/1
4 TABLET, ORALLY DISINTEGRATING ORAL EVERY 6 HOURS PRN
Qty: 30 TABLET | Refills: 3 | Status: SHIPPED | OUTPATIENT
Start: 2023-12-24 | End: 2024-01-23

## 2023-12-24 RX ORDER — ONDANSETRON 4 MG/1
4 TABLET, ORALLY DISINTEGRATING ORAL EVERY 6 HOURS PRN
Qty: 30 TABLET | Refills: 3 | Status: SHIPPED | OUTPATIENT
Start: 2023-12-24 | End: 2023-12-24

## 2023-12-24 RX ORDER — PROMETHAZINE HYDROCHLORIDE 25 MG/1
25 TABLET ORAL EVERY 6 HOURS PRN
Qty: 20 TABLET | Refills: 0 | Status: SHIPPED | OUTPATIENT
Start: 2023-12-24 | End: 2024-01-03

## 2023-12-24 RX ORDER — LEVOFLOXACIN 25 MG/ML
750 SOLUTION ORAL
Qty: 200 ML | Refills: 0 | Status: SHIPPED | OUTPATIENT
Start: 2023-12-24 | End: 2024-01-06

## 2023-12-24 RX ORDER — LEVOFLOXACIN 25 MG/ML
750 SOLUTION ORAL
Qty: 200 ML | Refills: 0 | Status: SHIPPED | OUTPATIENT
Start: 2023-12-24 | End: 2023-12-24

## 2023-12-24 RX ORDER — SUCRALFATE 1 G/10ML
1 SUSPENSION ORAL EVERY 6 HOURS
Qty: 1200 ML | Refills: 1 | Status: SHIPPED | OUTPATIENT
Start: 2023-12-24 | End: 2023-12-24

## 2023-12-24 RX ORDER — SUCRALFATE 1 G/10ML
1 SUSPENSION ORAL EVERY 6 HOURS
Qty: 1200 ML | Refills: 1 | Status: ON HOLD | OUTPATIENT
Start: 2023-12-24 | End: 2024-01-10

## 2023-12-24 RX ORDER — TACROLIMUS 0.5 MG/1
0.5 CAPSULE ORAL 2 TIMES DAILY
Status: DISCONTINUED | OUTPATIENT
Start: 2023-12-25 | End: 2023-12-24 | Stop reason: HOSPADM

## 2023-12-24 RX ORDER — PANTOPRAZOLE SODIUM 20 MG/1
20 TABLET, DELAYED RELEASE ORAL
Qty: 60 TABLET | Refills: 1 | Status: SHIPPED | OUTPATIENT
Start: 2023-12-24 | End: 2024-02-18

## 2023-12-24 RX ORDER — OXYCODONE HYDROCHLORIDE 5 MG/1
5 TABLET ORAL EVERY 12 HOURS PRN
Qty: 14 TABLET | Refills: 0 | Status: SHIPPED | OUTPATIENT
Start: 2023-12-24 | End: 2023-12-31

## 2023-12-24 RX ORDER — PANTOPRAZOLE SODIUM 20 MG/1
20 TABLET, DELAYED RELEASE ORAL
Qty: 60 TABLET | Refills: 1 | Status: SHIPPED | OUTPATIENT
Start: 2023-12-24 | End: 2023-12-24

## 2023-12-24 RX ADMIN — PROCHLORPERAZINE EDISYLATE 5 MG: 5 INJECTION INTRAMUSCULAR; INTRAVENOUS at 08:12

## 2023-12-24 RX ADMIN — FUROSEMIDE 20 MG: 20 TABLET ORAL at 08:12

## 2023-12-24 RX ADMIN — HYDROMORPHONE HYDROCHLORIDE 0.5 MG: 0.5 INJECTION, SOLUTION INTRAMUSCULAR; INTRAVENOUS; SUBCUTANEOUS at 08:12

## 2023-12-24 RX ADMIN — SUCRALFATE 1 G: 1 SUSPENSION ORAL at 06:12

## 2023-12-24 RX ADMIN — Medication 400 MG: at 08:12

## 2023-12-24 RX ADMIN — RIFAXIMIN 550 MG: 550 TABLET ORAL at 08:12

## 2023-12-24 RX ADMIN — PANTOPRAZOLE SODIUM 40 MG: 40 INJECTION, POWDER, FOR SOLUTION INTRAVENOUS at 08:12

## 2023-12-24 RX ADMIN — TACROLIMUS 0.5 MG: 0.5 CAPSULE ORAL at 08:12

## 2023-12-24 RX ADMIN — LEVOTHYROXINE SODIUM 75 MCG: 75 TABLET ORAL at 06:12

## 2023-12-24 RX ADMIN — HYDROMORPHONE HYDROCHLORIDE 0.5 MG: 0.5 INJECTION, SOLUTION INTRAMUSCULAR; INTRAVENOUS; SUBCUTANEOUS at 03:12

## 2023-12-24 RX ADMIN — VANCOMYCIN HYDROCHLORIDE 750 MG: 750 INJECTION, POWDER, LYOPHILIZED, FOR SOLUTION INTRAVENOUS at 08:12

## 2023-12-24 RX ADMIN — SPIRONOLACTONE 50 MG: 25 TABLET ORAL at 08:12

## 2023-12-24 RX ADMIN — VENLAFAXINE HYDROCHLORIDE 150 MG: 37.5 CAPSULE, EXTENDED RELEASE ORAL at 08:12

## 2023-12-24 RX ADMIN — ONDANSETRON 4 MG: 2 INJECTION INTRAMUSCULAR; INTRAVENOUS at 06:12

## 2023-12-24 NOTE — PROGRESS NOTES
Per Dr. Christianson, 0.5mg tacrolimus administered at this time because 8am dose was not given today.

## 2023-12-24 NOTE — PLAN OF CARE
[facility] - Telemetry Stepdown  Discharge Final Note    Primary Care Provider: David Lorenzo MD    Expected Discharge Date: 12/24/2023    Final Discharge Note (most recent)       Final Note - 12/23/23 1000          Final Note    Assessment Type Discharge Planning Assessment                   Future Appointments   Date Time Provider Department Center   1/25/2024  9:00 AM David Lorenzo MD [facility]   1/27/2024  8:00 AM [facility], LABORATORY [facility] LAB [facility]   2/28/2024  3:30 PM Constance Duffy, NP [facility] [place]   3/7/2024  4:45 PM Constance Duffy, NP [facility] [place]   9/27/2024 10:20 AM Dave Upton MD [facility] CARDIO1 [facility]                  Mary Barfield RN  Case Management  [facility]  [phone]

## 2023-12-24 NOTE — PROGRESS NOTES
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 Was found to have GPCs in 1/4 bottles, BCID + strep spp. s/p EGD 12/22, procedure note with 9 cm mucosal tear. No clear perforation on imaging, awaiting additional studies. Suspect this is the source of bacteremia.     Recommendations  - follow up susceptibility of strep mitis/oralis  - if sensitive, recommend discharge on levaquin 750mg Q48H (suspension is available)  - if resistant to levaquin, recommend linezolid 600mg BID (recommend price check suspension prior to discharge. OK to crush tabs if suspension is too expensive)  - end date of abx is 1/6/23    Will sign off, call w/ questions        Anticipated Disposition: TBD    Thank you for your consult. I will sign off. Please contact us if you have any additional questions.    Juliann Borrego DO  Transplant Infectious Disease    Time: 50 minutes   50% of time spent on face-to-face counseling and coordination of care. Counseling included review of test results, diagnosis, and treatment plan with patient and/or family.      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 Pt has had frequent  hospitalizations, most recently in March of 2023, and since has been on liquid diet. Pt reports she is unable to tolerate solid food as it is hard for her to swallow and has issue with the taste. Pt states her diet consists of 3 boost drinks and 1 smoothie a day. Pt reports abdominal pain, nausea. Denies sick contacts. Reports compliance with medication.     Since admission, pt without leukocytosis. Pro naveed 1.7. Hep A antibody negative. Hep b screen negative. Hep C screen negative. UA without infectious concerns. Blood cultures + GPC resembling strep in 1/4 bottle, BCID + strep sp. Repeat blood cultures pending. Chest xray negative for acute findings. CT head negative for acute findings. CT AP noting partial obstruction in esophagus, cirrhotic liver morphology, splenomegaly, trace ascites, colitis of ascending colon, gastritis, mild anasarca, and small right pleural effusion. Liver ultrasound noting elevated intraparenchymal resistive indices, noting rejection fluid overload vs chronic liver disease.     IR following and attempted paracentesis, but noting there wasn't a safe window of fluid and therefore procedure was cancelled. Pt is s/p endoscopy today, noting 9 cm mucosal tear which was biopsied, noting esophageal stenosis near EGJ with esophagitis, pyloric stenosis. Gen surgery following and planning EGD on 12/23, cf severe esophageal inflammation or esophageal stricture, and noting significant distension of lower esophagus with fluid and debris, as well as hyperdense structure at GEJ.     Pt is currently afebrile, HDS. On vanc and ceftriaxone.     GI and hepatology are following. ID was consulted for antibiotic recs.   Interval History: no events, planning for discharge today    Review of Systems   All other systems reviewed and are negative.    Objective:     Vital Signs (Most Recent):  Temp: 98.9 °F (37.2 °C) (12/24/23 1049)  Pulse: 79 (12/24/23 1049)  Resp: 18 (12/24/23 1049)  BP: (!) 196/84 (12/24/23  1049)  SpO2: 99 % (12/24/23 1250) Vital Signs (24h Range):  Temp:  [97.3 °F (36.3 °C)-99.2 °F (37.3 °C)] 98.9 °F (37.2 °C)  Pulse:  [66-79] 79  Resp:  [16-19] 18  SpO2:  [96 %-100 %] 99 %  BP: (139-196)/(65-94) 196/84     Weight: 50.8 kg (112 lb)  Body mass index is 22.61 kg/m².    Estimated Creatinine Clearance: 44.5 mL/min (based on SCr of 1.1 mg/dL).     Physical Exam  Vitals reviewed.   Constitutional:       General: She is not in acute distress.     Appearance: Normal appearance. She is not ill-appearing.   HENT:      Head: Normocephalic.      Nose: Nose normal.      Mouth/Throat:      Mouth: Mucous membranes are moist.      Pharynx: Oropharynx is clear.   Eyes:      General:         Right eye: No discharge.         Left eye: No discharge.      Conjunctiva/sclera: Conjunctivae normal.   Cardiovascular:      Rate and Rhythm: Normal rate and regular rhythm.      Pulses: Normal pulses.      Heart sounds: Normal heart sounds. No murmur heard.  Pulmonary:      Effort: Pulmonary effort is normal. No respiratory distress.      Breath sounds: Normal breath sounds. No stridor.   Abdominal:      General: Abdomen is flat. There is no distension.      Palpations: Abdomen is soft.      Tenderness: There is no abdominal tenderness.   Musculoskeletal:         General: Normal range of motion.      Cervical back: Normal range of motion.      Right lower leg: No edema.      Left lower leg: No edema.   Skin:     General: Skin is warm.      Findings: No erythema or rash.   Neurological:      General: No focal deficit present.      Mental Status: She is alert and oriented to person, place, and time.      Motor: No weakness.      Gait: Gait normal.   Psychiatric:         Mood and Affect: Mood normal.         Behavior: Behavior normal.         Thought Content: Thought content normal.         Judgment: Judgment normal.          Significant Labs: CBC:   Recent Labs   Lab 12/23/23  0502 12/24/23  0238   WBC 4.81 3.08*   HGB 9.5* 9.0*    HCT 28.9* 27.2*   PLT 61* 83*       CMP:   Recent Labs   Lab 12/23/23  0502 12/24/23  0238    136   K 4.4 4.1    106   CO2 20* 22*   GLU 74 86   BUN 41* 25*   CREATININE 1.2 1.1   CALCIUM 8.6* 8.7   PROT 6.0 6.1   ALBUMIN 2.6* 2.6*   BILITOT 3.5* 3.4*   ALKPHOS 447* 478*   AST 57* 62*   ALT 53* 52*   ANIONGAP 9 8       Microbiology Results (last 7 days)       Procedure Component Value Units Date/Time    Blood culture x two cultures. Draw prior to antibiotics. [[id number]] Collected: 12/21/23 0749    Order Status: Completed Specimen: Blood from Peripheral, Antecubital, Right Updated: 12/24/23 0812     Blood Culture, Routine No Growth to date      No Growth to date      No Growth to date      No Growth to date    Narrative:      Aerobic and anaerobic    Blood culture [[id number]] Collected: 12/23/23 0502    Order Status: Completed Specimen: Blood from Peripheral, Right Arm Updated: 12/24/23 0613     Blood Culture, Routine No Growth to date      No Growth to date    Blood culture [[id number]] Collected: 12/23/23 0504    Order Status: Completed Specimen: Blood from Antecubital, Right Arm Updated: 12/24/23 0613     Blood Culture, Routine No Growth to date      No Growth to date    Blood culture x two cultures. Draw prior to antibiotics. [[id number]]  (Abnormal) Collected: 12/21/23 0807    Order Status: Completed Specimen: Blood from Peripheral, Forearm, Right Updated: 12/24/23 0559     Blood Culture, Routine Gram stain tommie bottle: Gram positive cocci in chains resembling Strep      Results called to and read back by: Daniela Ott RN      Gram stain aer bottle: Gram positive cocci in chains resembling Strep      Positive results previously called 12/23/2023  13:48      STREPTOCOCCUS MITIS/ORALIS   Susceptibility pending      Narrative:      Aerobic and anaerobic    Blood culture [[id number]]     Order Status: Canceled Specimen: Blood     Blood culture [[id number]]     Order Status: Canceled Specimen: Blood      Rapid Organism ID by PCR (from Blood culture) [[id number]]  (Abnormal) Collected: 12/21/23 0807    Order Status: Completed Updated: 12/22/23 0749     Enterococcus faecalis Not Detected     Enterococcus faecium Not Detected     Listeria monocytogenes Not Detected     Staphylococcus spp. Not Detected     Staphylococcus aureus Not Detected     Staphylococcus epidermidis Not Detected     Staphylococcus lugdunensis Not Detected     Streptococcus species Detected     Streptococcus agalactiae Not Detected     Streptococcus pneumoniae Not Detected     Streptococcus pyogenes Not Detected     Acinetobacter calcoaceticus/baumannii complex Not Detected     Bacteroides fragilis Not Detected     Enterobacterales Not Detected     Enterobacter cloacae complex Not Detected     Escherichia coli Not Detected     Klebsiella aerogenes Not Detected     Klebsiella oxytoca Not Detected     Klebsiella pneumoniae group Not Detected     Proteus Not Detected     Salmonella sp Not Detected     Serratia marcescens Not Detected     Haemophilus influenzae Not Detected     Neisseria meningtidis Not Detected     Pseudomonas aeruginosa Not Detected     Stenotrophomonas maltophilia Not Detected     Candida albicans Not Detected     Candida auris Not Detected     Candida glabrata Not Detected     Candida krusei Not Detected     Candida parapsilosis Not Detected     Candida tropicalis Not Detected     Cryptococcus neoformans/gattii Not Detected     CTX-M (ESBL ) Test Not Applicable     IMP (Carbapenem resistant) Test Not Applicable     KPC resistance gene (Carbapenem resistant) Test Not Applicable     mcr-1  Test Not Applicable     mec A/C  Test Not Applicable     mec A/C and MREJ (MRSA) gene Test Not Applicable     NDM (Carbapenem resistant) Test Not Applicable     OXA-48-like (Carbapenem resistant) Test Not Applicable     van A/B (VRE gene) Test Not Applicable     VIM (Carbapenem resistant) Test Not Applicable    Narrative:      Aerobic and  anaerobic    (rule out SBP) Aerobic culture [[id number]]     Order Status: Canceled Specimen: Ascites     (rule out SBP) Culture, Anaerobic [[id number]]     Order Status: Canceled Specimen: Ascites     (rule out SBP) Gram stain [[id number]]     Order Status: Canceled Specimen: Ascites     Clostridium difficile EIA [[id number]]     Order Status: Canceled Specimen: Stool             Significant Imaging: I have reviewed all pertinent imaging results/findings within the past 24 hours.

## 2023-12-24 NOTE — SUBJECTIVE & OBJECTIVE
Interval History: NAEON  Esophagram negative for leak  Started on CLD, tolerating well. No further nausea, vomiting  Still with some chest pain although improved    Medications:  Continuous Infusions:  Scheduled Meds:   cefTRIAXone (ROCEPHIN) IVPB  2 g Intravenous Q24H    furosemide  20 mg Oral Daily    levothyroxine  75 mcg Oral Daily    magnesium oxide  400 mg Oral BID    ondansetron  4 mg Intravenous Q6H    pantoprazole  40 mg Intravenous BID    rifAXIMin  550 mg Oral BID    spironolactone  50 mg Oral Daily    sucralfate  1 g Oral Q6H    tacrolimus  0.5 mg Sublingual Daily AM    vancomycin (VANCOCIN) IV (PEDS and ADULTS)  750 mg Intravenous Q24H    venlafaxine  150 mg Oral Daily     PRN Meds:albuterol-ipratropium, aluminum-magnesium hydroxide-simethicone, bisacodyL, calcium carbonate, diphenhydrAMINE, HYDROmorphone, lactulose, melatonin, naloxone, polyethylene glycol, prochlorperazine, simethicone, sodium chloride 0.9%, Pharmacy to dose Vancomycin consult **AND** vancomycin - pharmacy to dose     Review of patient's allergies indicates:   Allergen Reactions    Codeine Itching     Other reaction(s): Itching    Lipitor [atorvastatin] Other (See Comments)     Other reaction(s): Muscle pain  Muscle cranmps    Morphine Itching     Other reaction(s): nausea and vomiting     Zoloft [sertraline] Other (See Comments)     Tremors/muscle spasms     Objective:     Vital Signs (Most Recent):  Temp: 98.9 °F (37.2 °C) (12/24/23 0804)  Pulse: 72 (12/24/23 0804)  Resp: 17 (12/24/23 0804)  BP: (!) 184/72 (12/24/23 0804)  SpO2: 98 % (12/24/23 0804) Vital Signs (24h Range):  Temp:  [97.3 °F (36.3 °C)-99.2 °F (37.3 °C)] 98.9 °F (37.2 °C)  Pulse:  [66-81] 72  Resp:  [16-19] 17  SpO2:  [96 %-100 %] 98 %  BP: (139-184)/(65-94) 184/72     Weight: 50.8 kg (112 lb)  Body mass index is 22.61 kg/m².    Intake/Output - Last 3 Shifts         12/22 0700 12/23 0659 12/23 0700 12/24 0659 12/24 0700 12/25 0659    P.O.       IV Piggyback 700       Total Intake(mL/kg) 700 (13.8)      Urine (mL/kg/hr) 650 (0.5) 1500 (1.2)     Stool  0     Total Output 650 1500     Net +50 -1500            Stool Occurrence  0 x              Physical Exam  Vitals and nursing note reviewed.   Constitutional:       Appearance: Normal appearance.   HENT:      Head: Normocephalic and atraumatic.   Cardiovascular:      Rate and Rhythm: Normal rate and regular rhythm.   Pulmonary:      Effort: Pulmonary effort is normal. No respiratory distress.   Abdominal:      General: Abdomen is flat. There is no distension.      Palpations: Abdomen is soft. There is no mass.      Tenderness: There is no abdominal tenderness.      Hernia: No hernia is present.   Musculoskeletal:      Right lower leg: No edema.      Left lower leg: No edema.   Skin:     General: Skin is warm and dry.   Neurological:      General: No focal deficit present.      Mental Status: She is alert and oriented to person, place, and time.   Psychiatric:         Mood and Affect: Mood normal.         Behavior: Behavior normal.          Significant Labs:  I have reviewed all pertinent lab results within the past 24 hours.    Significant Diagnostics:  I have reviewed all pertinent imaging results/findings within the past 24 hours.

## 2023-12-24 NOTE — ASSESSMENT & PLAN NOTE
Ulcer of esophagus without bleeding  53 year old female with above history presenting with esophageal ulceration and possibly contained perforation following several days of vomiting at home. If there was a full thickness perforation it is now contained. Not amendable to stenting per GI given length of ulceration. Ct with oral contrast and esophagram both negative for active extravasation of contrast. She does have small pleural effusion on the right. Esophagram negative for leak. Started on clear liquids     - No surgical intervention indicated  - Advance diet slowly, do not advance past full liquids   - Recommend PPI BID and sucralfate for 6-8 weeks and repeat EGD at that time to monitor healing process  - If considering discharge, would discharge with anti-emetic regimen

## 2023-12-24 NOTE — PROGRESS NOTES
Therapy with vancomycin complete and/or consult discontinued by provider.  Pharmacy will sign off, please re-consult as needed.    Shira Moyer, PharmD  [phone]

## 2023-12-24 NOTE — PROGRESS NOTES
[facility] - Telemetry Stepdown  General Surgery  Progress Note    Subjective:     History of Present Illness:  No notes on file    Post-Op Info:  Procedure(s) (LRB):  EGD (ESOPHAGOGASTRODUODENOSCOPY) (N/A)   2 Days Post-Op     Interval History: NAEON  Esophagram negative for leak  Started on CLD, tolerating well. No further nausea, vomiting  Still with some chest pain although improved    Medications:  Continuous Infusions:  Scheduled Meds:   cefTRIAXone (ROCEPHIN) IVPB  2 g Intravenous Q24H    furosemide  20 mg Oral Daily    levothyroxine  75 mcg Oral Daily    magnesium oxide  400 mg Oral BID    ondansetron  4 mg Intravenous Q6H    pantoprazole  40 mg Intravenous BID    rifAXIMin  550 mg Oral BID    spironolactone  50 mg Oral Daily    sucralfate  1 g Oral Q6H    tacrolimus  0.5 mg Sublingual Daily AM    vancomycin (VANCOCIN) IV (PEDS and ADULTS)  750 mg Intravenous Q24H    venlafaxine  150 mg Oral Daily     PRN Meds:albuterol-ipratropium, aluminum-magnesium hydroxide-simethicone, bisacodyL, calcium carbonate, diphenhydrAMINE, HYDROmorphone, lactulose, melatonin, naloxone, polyethylene glycol, prochlorperazine, simethicone, sodium chloride 0.9%, Pharmacy to dose Vancomycin consult **AND** vancomycin - pharmacy to dose     Review of patient's allergies indicates:   Allergen Reactions    Codeine Itching     Other reaction(s): Itching    Lipitor [atorvastatin] Other (See Comments)     Other reaction(s): Muscle pain  Muscle cranmps    Morphine Itching     Other reaction(s): nausea and vomiting     Zoloft [sertraline] Other (See Comments)     Tremors/muscle spasms     Objective:     Vital Signs (Most Recent):  Temp: 98.9 °F (37.2 °C) (12/24/23 0804)  Pulse: 72 (12/24/23 0804)  Resp: 17 (12/24/23 0804)  BP: (!) 184/72 (12/24/23 0804)  SpO2: 98 % (12/24/23 0804) Vital Signs (24h Range):  Temp:  [97.3 °F (36.3 °C)-99.2 °F (37.3 °C)] 98.9 °F (37.2 °C)  Pulse:  [66-81] 72  Resp:  [16-19] 17  SpO2:  [96 %-100 %] 98 %  BP:  (139-184)/(65-94) 184/72     Weight: 50.8 kg (112 lb)  Body mass index is 22.61 kg/m².    Intake/Output - Last 3 Shifts         12/22 0700 12/23 0659 12/23 0700 12/24 0659 12/24 0700 12/25 0659    P.O.       IV Piggyback 700      Total Intake(mL/kg) 700 (13.8)      Urine (mL/kg/hr) 650 (0.5) 1500 (1.2)     Stool  0     Total Output 650 1500     Net +50 -1500            Stool Occurrence  0 x              Physical Exam  Vitals and nursing note reviewed.   Constitutional:       Appearance: Normal appearance.   HENT:      Head: Normocephalic and atraumatic.   Cardiovascular:      Rate and Rhythm: Normal rate and regular rhythm.   Pulmonary:      Effort: Pulmonary effort is normal. No respiratory distress.   Abdominal:      General: Abdomen is flat. There is no distension.      Palpations: Abdomen is soft. There is no mass.      Tenderness: There is no abdominal tenderness.      Hernia: No hernia is present.   Musculoskeletal:      Right lower leg: No edema.      Left lower leg: No edema.   Skin:     General: Skin is warm and dry.   Neurological:      General: No focal deficit present.      Mental Status: She is alert and oriented to person, place, and time.   Psychiatric:         Mood and Affect: Mood normal.         Behavior: Behavior normal.          Significant Labs:  I have reviewed all pertinent lab results within the past 24 hours.    Significant Diagnostics:  I have reviewed all pertinent imaging results/findings within the past 24 hours.  Assessment/Plan:     * Ulcer of esophagus without bleeding   Ulcer of esophagus without bleeding  53 year old female with above history presenting with esophageal ulceration and possibly contained perforation following several days of vomiting at home. If there was a full thickness perforation it is now contained. Not amendable to stenting per GI given length of ulceration. Ct with oral contrast and esophagram both negative for active extravasation of contrast. She does have  small pleural effusion on the right. Esophagram negative for leak. Started on clear liquids     - No surgical intervention indicated  - Advance diet slowly, do not advance past full liquids   - Recommend PPI BID and sucralfate for 6-8 weeks and repeat EGD at that time to monitor healing process  - If considering discharge, would discharge with anti-emetic regimen          Kendy Amaro MD  General Surgery  [facility] - Telemetry Stepdown

## 2023-12-24 NOTE — PLAN OF CARE
Problem: Adult Inpatient Plan of Care  Goal: Plan of Care Review  Outcome: Met  Goal: Patient-Specific Goal (Individualized)  Outcome: Met  Goal: Absence of Hospital-Acquired Illness or Injury  Outcome: Met  Goal: Optimal Comfort and Wellbeing  Outcome: Met  Goal: Readiness for Transition of Care  Outcome: Met     Problem: Infection  Goal: Absence of Infection Signs and Symptoms  Outcome: Met     Problem: Pain Acute  Goal: Acceptable Pain Control and Functional Ability  Outcome: Met     Problem: Fluid and Electrolyte Imbalance (Acute Kidney Injury/Impairment)  Goal: Fluid and Electrolyte Balance  Outcome: Met     Problem: Oral Intake Inadequate (Acute Kidney Injury/Impairment)  Goal: Optimal Nutrition Intake  Outcome: Met     Problem: Renal Function Impairment (Acute Kidney Injury/Impairment)  Goal: Effective Renal Function  Outcome: Met     Pt AAOx4. VSS. Routine meds administered. Safety measures maintained. POC met. Monitoring ongoing.

## 2023-12-24 NOTE — PLAN OF CARE
Recommendations    1) Continue CLD, ADAT per MD   2) RD following    Goals: Meet % een/epn by next RD f/y  Nutrition Goal Status: new  Communication of RD Recs: other (comment) (POC)

## 2023-12-24 NOTE — TREATMENT PLAN
Hepatology Treatment Plan    Elda Hernandez is a 53 y.o. female admitted to hospital 2023 (Hospital Day: 4) due to Ulcer of esophagus without bleeding.     Interval History  CTS team consulted & they ruled out surgical intervention. Recommended full liquids diet & PPI.      Pro level 4.2>7 today.  AST 57>62, ALT 53>52, creatinine 1.2>1.1.    ID recommended vanc and Rocephin for blood cx + strep spp.     Objective  Temp:  [97.3 °F (36.3 °C)-99.2 °F (37.3 °C)] 98.9 °F (37.2 °C) ( 104)  Pulse:  [66-79] 79 (1049)  BP: (139-196)/(65-94) 196/84 ( 104)  Resp:  [16-19] 18 (1049)  SpO2:  [96 %-100 %] 99 % (1049)        Laboratory    Lab Results   Component Value Date    WBC 3.08 (L) 2023    HGB 9.0 (L) 2023    HCT 27.2 (L) 2023    MCV 92 2023    PLT 83 (L) 2023       Lab Results   Component Value Date     2023    K 4.1 2023     2023    CO2 22 (L) 2023    BUN 25 (H) 2023    CREATININE 1.1 2023    CALCIUM 8.7 2023       Lab Results   Component Value Date    ALBUMIN 2.6 (L) 2023    ALT 52 (H) 2023    AST 62 (H) 2023     (H) 2018    ALKPHOS 478 (H) 2023    BILITOT 3.4 (H) 2023       Lab Results   Component Value Date    INR 1.3 (H) 2023    INR 1.3 (H) 2023    INR 1.3 (H) 2023       MELD 3.0: 18 at 2023  2:38 AM  MELD-Na: 16 at 2023  2:38 AM  Calculated from:  Serum Creatinine: 1.1 mg/dL at 2023  2:38 AM  Serum Sodium: 136 mmol/L at 2023  2:38 AM  Total Bilirubin: 3.4 mg/dL at 2023  2:38 AM  Serum Albumin: 2.6 g/dL at 2023  2:38 AM  INR(ratio): 1.3 at 2023  2:38 AM  Age at listin years  Sex: Female at 2023  2:38 AM      Assessment  Elda Hernandez is a 53 y.o. 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  CT scan concerning for lower esophageal dilation and abnormality at GE junction. EGD was performed 12/22/2023 - 9 cm linear tear with ulceration found in middle and lower 3rd of esophagus, severe stenosis 35 cm from incisors, deformity in gastric fundus, and severe stenosis at pylorus was found.  CT with oral contrast demonstrated two small areas of abnormal outpouching and one small foci of extraluminal air, however no contrast extravasation. CTS ruled out surgical intervention.     LFTs indicate chronic rejection; managed on Prograf 0.5 mg b.i.d. Hepatic encephalopathy well-controlled with lactulose and Xifaxan. The possibility of re transplantation has been discussed; patient would be high risk given her multiple abdominal surgeries.     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 If vomiting, schedule anti-emetics.    - Appreciate GI & CTS recs. PPI & Carafate per their recs.     - Appreciate ID recs with regards to management of bacteremia.      - Lactulose TID and Rifaximin BID; titrate dose frequency of Lactulose to 3 bowel movement daily.      - Minimize use of sedating agents that may precipitate encephalopathy (e.g. opioids and benzo's).      - please obtain daily CBC, CMP, AM Prograf level    - Plan of care was discussed with primary team    Thank you for involving us in the care of Elda Hernandez. Please call with any additional concerns or questions.    Pillo Maguire MD, PGY-C  Gastroenterology Fellow  [facility]

## 2023-12-24 NOTE — SUBJECTIVE & OBJECTIVE
Interval History: no events, planning for discharge today    Review of Systems   All other systems reviewed and are negative.    Objective:     Vital Signs (Most Recent):  Temp: 98.9 °F (37.2 °C) (12/24/23 1049)  Pulse: 79 (12/24/23 1049)  Resp: 18 (12/24/23 1049)  BP: (!) 196/84 (12/24/23 1049)  SpO2: 99 % (12/24/23 1250) Vital Signs (24h Range):  Temp:  [97.3 °F (36.3 °C)-99.2 °F (37.3 °C)] 98.9 °F (37.2 °C)  Pulse:  [66-79] 79  Resp:  [16-19] 18  SpO2:  [96 %-100 %] 99 %  BP: (139-196)/(65-94) 196/84     Weight: 50.8 kg (112 lb)  Body mass index is 22.61 kg/m².    Estimated Creatinine Clearance: 44.5 mL/min (based on SCr of 1.1 mg/dL).     Physical Exam  Vitals reviewed.   Constitutional:       General: She is not in acute distress.     Appearance: Normal appearance. She is not ill-appearing.   HENT:      Head: Normocephalic.      Nose: Nose normal.      Mouth/Throat:      Mouth: Mucous membranes are moist.      Pharynx: Oropharynx is clear.   Eyes:      General:         Right eye: No discharge.         Left eye: No discharge.      Conjunctiva/sclera: Conjunctivae normal.   Cardiovascular:      Rate and Rhythm: Normal rate and regular rhythm.      Pulses: Normal pulses.      Heart sounds: Normal heart sounds. No murmur heard.  Pulmonary:      Effort: Pulmonary effort is normal. No respiratory distress.      Breath sounds: Normal breath sounds. No stridor.   Abdominal:      General: Abdomen is flat. There is no distension.      Palpations: Abdomen is soft.      Tenderness: There is no abdominal tenderness.   Musculoskeletal:         General: Normal range of motion.      Cervical back: Normal range of motion.      Right lower leg: No edema.      Left lower leg: No edema.   Skin:     General: Skin is warm.      Findings: No erythema or rash.   Neurological:      General: No focal deficit present.      Mental Status: She is alert and oriented to person, place, and time.      Motor: No weakness.      Gait: Gait  normal.   Psychiatric:         Mood and Affect: Mood normal.         Behavior: Behavior normal.         Thought Content: Thought content normal.         Judgment: Judgment normal.          Significant Labs: CBC:   Recent Labs   Lab 12/23/23  0502 12/24/23 0238   WBC 4.81 3.08*   HGB 9.5* 9.0*   HCT 28.9* 27.2*   PLT 61* 83*       CMP:   Recent Labs   Lab 12/23/23  0502 12/24/23 0238    136   K 4.4 4.1    106   CO2 20* 22*   GLU 74 86   BUN 41* 25*   CREATININE 1.2 1.1   CALCIUM 8.6* 8.7   PROT 6.0 6.1   ALBUMIN 2.6* 2.6*   BILITOT 3.5* 3.4*   ALKPHOS 447* 478*   AST 57* 62*   ALT 53* 52*   ANIONGAP 9 8       Microbiology Results (last 7 days)       Procedure Component Value Units Date/Time    Blood culture x two cultures. Draw prior to antibiotics. [[id number]] Collected: 12/21/23 0749    Order Status: Completed Specimen: Blood from Peripheral, Antecubital, Right Updated: 12/24/23 0812     Blood Culture, Routine No Growth to date      No Growth to date      No Growth to date      No Growth to date    Narrative:      Aerobic and anaerobic    Blood culture [[id number]] Collected: 12/23/23 0502    Order Status: Completed Specimen: Blood from Peripheral, Right Arm Updated: 12/24/23 0613     Blood Culture, Routine No Growth to date      No Growth to date    Blood culture [[id number]] Collected: 12/23/23 0504    Order Status: Completed Specimen: Blood from Antecubital, Right Arm Updated: 12/24/23 0613     Blood Culture, Routine No Growth to date      No Growth to date    Blood culture x two cultures. Draw prior to antibiotics. [[id number]]  (Abnormal) Collected: 12/21/23 0807    Order Status: Completed Specimen: Blood from Peripheral, Forearm, Right Updated: 12/24/23 0559     Blood Culture, Routine Gram stain tommie bottle: Gram positive cocci in chains resembling Strep      Results called to and read back by: Daniela Ott RN      Gram stain aer bottle: Gram positive cocci in chains resembling Strep       Positive results previously called 12/23/2023  13:48      STREPTOCOCCUS MITIS/ORALIS   Susceptibility pending      Narrative:      Aerobic and anaerobic    Blood culture [[id number]]     Order Status: Canceled Specimen: Blood     Blood culture [[id number]]     Order Status: Canceled Specimen: Blood     Rapid Organism ID by PCR (from Blood culture) [[id number]]  (Abnormal) Collected: 12/21/23 0807    Order Status: Completed Updated: 12/22/23 0749     Enterococcus faecalis Not Detected     Enterococcus faecium Not Detected     Listeria monocytogenes Not Detected     Staphylococcus spp. Not Detected     Staphylococcus aureus Not Detected     Staphylococcus epidermidis Not Detected     Staphylococcus lugdunensis Not Detected     Streptococcus species Detected     Streptococcus agalactiae Not Detected     Streptococcus pneumoniae Not Detected     Streptococcus pyogenes Not Detected     Acinetobacter calcoaceticus/baumannii complex Not Detected     Bacteroides fragilis Not Detected     Enterobacterales Not Detected     Enterobacter cloacae complex Not Detected     Escherichia coli Not Detected     Klebsiella aerogenes Not Detected     Klebsiella oxytoca Not Detected     Klebsiella pneumoniae group Not Detected     Proteus Not Detected     Salmonella sp Not Detected     Serratia marcescens Not Detected     Haemophilus influenzae Not Detected     Neisseria meningtidis Not Detected     Pseudomonas aeruginosa Not Detected     Stenotrophomonas maltophilia Not Detected     Candida albicans Not Detected     Candida auris Not Detected     Candida glabrata Not Detected     Candida krusei Not Detected     Candida parapsilosis Not Detected     Candida tropicalis Not Detected     Cryptococcus neoformans/gattii Not Detected     CTX-M (ESBL ) Test Not Applicable     IMP (Carbapenem resistant) Test Not Applicable     KPC resistance gene (Carbapenem resistant) Test Not Applicable     mcr-1  Test Not Applicable     mec A/C  Test  Not Applicable     mec A/C and MREJ (MRSA) gene Test Not Applicable     NDM (Carbapenem resistant) Test Not Applicable     OXA-48-like (Carbapenem resistant) Test Not Applicable     van A/B (VRE gene) Test Not Applicable     VIM (Carbapenem resistant) Test Not Applicable    Narrative:      Aerobic and anaerobic    (rule out SBP) Aerobic culture [[id number]]     Order Status: Canceled Specimen: Ascites     (rule out SBP) Culture, Anaerobic [[id number]]     Order Status: Canceled Specimen: Ascites     (rule out SBP) Gram stain [[id number]]     Order Status: Canceled Specimen: Ascites     Clostridium difficile EIA [[id number]]     Order Status: Canceled Specimen: Stool             Significant Imaging: I have reviewed all pertinent imaging results/findings within the past 24 hours.

## 2023-12-24 NOTE — PLAN OF CARE
Problem: Adult Inpatient Plan of Care  Goal: Plan of Care Review  Outcome: Ongoing, Progressing  Flowsheets (Taken 12/24/2023 0504)  Plan of Care Reviewed With: patient  Goal: Optimal Comfort and Wellbeing  Outcome: Ongoing, Progressing  Intervention: Provide Person-Centered Care  Flowsheets (Taken 12/24/2023 0504)  Trust Relationship/Rapport:   care explained   choices provided   emotional support provided   empathic listening provided   questions answered   questions encouraged   reassurance provided   thoughts/feelings acknowledged     Problem: Infection  Goal: Absence of Infection Signs and Symptoms  Outcome: Ongoing, Progressing     Problem: Pain Acute  Goal: Acceptable Pain Control and Functional Ability  Outcome: Ongoing, Not Progressing  Intervention: Develop Pain Management Plan  Flowsheets (Taken 12/24/2023 0504)  Pain Management Interventions:   around-the-clock dosing utilized   awakened for pain meds per patient request   medication offered   pain management plan reviewed with patient/caregiver   quiet environment facilitated  Intervention: Prevent or Manage Pain  Flowsheets (Taken 12/24/2023 0504)  Medication Review/Management: medications reviewed     PRN pain medications administered per order. Prograf given at 2330 since am dose was not administered. No c/o nausea. Pt resting comfortably at this time.

## 2023-12-24 NOTE — CONSULTS
"  [facility] - Telemetry Stepdown  Adult Nutrition  Consult Note    SUMMARY     Recommendations    1) Continue CLD, ADAT per MD   2) RD following    Goals: Meet % een/epn by next RD f/y  Nutrition Goal Status: new  Communication of RD Recs: other (comment) (POC)    Assessment and Plan    Nutrition Problem  Inadequate oral intake    Related to (etiology):   Inability to consume sufficient energy     Signs and Symptoms (as evidenced by):   Liquid diet    Interventions/Recommendations (treatment strategy):  Collaboration with other providers    Nutrition Diagnosis Status:   New      Reason for Assessment    Reason For Assessment: consult  Diagnosis: gastrointestinal disease  Relevant Medical History: von Gierke disease s/p liver transplant (2002), HTN, seizures  Interdisciplinary Rounds: did not attend  General Information Comments: RD consulted for malnutrition & liquid diet. Pt states MD advised FLD (not yet ordered). Pt with 40oz smoothie at bedside. Denies n/v/d/c at this time. Denies chewing/swallowing issues. Endorses decreased intake 6 days d/t n/v. Good appetite prior w/ 1 meals/day and 1 protein shakes. States #, denies wt loss. 6% wt loss in 7 months per chart history (not significant). Pt refused NFPE at this time. RD to attempt at f/u if appropriate.        Nutrition Discharge Planning: adequate intake    Nutrition Risk Screen    Nutrition Risk Screen: reduced oral intake over the last month    Nutrition/Diet History    Patient Reported Diet/Restrictions/Preferences: general  Typical Food/Fluid Intake: 1 meal/day  Spiritual, Cultural Beliefs, Zoroastrianism Practices, Values that Affect Care: no  Supplemental Drinks or Food Habits: Other (see comments) (1 protein shake/day (was unsure of kind))  Vitamin/Mineral/Herbal Supplements: MVI  Food Allergies: NKFA    Anthropometrics    Temp: 98.9 °F (37.2 °C)  Height Method: Stated  Height: 4' 11.02" (149.9 cm)  Height (inches): 59.02 in  Weight Method: Bed " Scale  Weight: 50.8 kg (112 lb)  Weight (lb): 112 lb  Ideal Body Weight (IBW), Female: 95.1 lb  % Ideal Body Weight, Female (lb): 117.77 %  BMI (Calculated): 22.6  BMI Grade: 18.5-24.9 - normal       Lab/Procedures/Meds    Pertinent Labs Reviewed: reviewed  Pertinent Labs Comments: H/h: 9/27.2, bun: 25, gfr: 59.3, ast: 62,  alt: 52  Pertinent Medications Reviewed: reviewed  Pertinent Medications Comments: Levothyroxine, pantoprazole, tacolimus, ABX      Estimated/Assessed Needs    Weight Used For Calorie Calculations: 50.8 kg (112 lb)  Energy Calorie Requirements (kcal): 5339-7709 (25-30 kcal/kg)  Energy Need Method: Kcal/kg  Protein Requirements: 50-61 (1-1.2 g/kg)  Weight Used For Protein Calculations: 50.8 kg (112 lb)     Estimated Fluid Requirement Method: RDA Method  RDA Method (mL): 1270         Nutrition Prescription Ordered    Current Diet Order: CLD    Evaluation of Received Nutrient/Fluid Intake    I/O: -1 L since admit  Comments: LBM 12/21  % Intake of Estimated Energy Needs: 50 - 75 %  % Meal Intake: 50 - 75 %    Nutrition Risk    Level of Risk/Frequency of Follow-up: low (f/u 1-2x/week)       Monitor and Evaluation    Food and Nutrient Intake: energy intake, food and beverage intake  Food and Nutrient Adminstration: diet order  Knowledge/Beliefs/Attitudes: food and nutrition knowledge/skill, beliefs and attitudes  Physical Activity and Function: nutrition-related ADLs and IADLs  Anthropometric Measurements: height/length, weight change, weight, body mass index  Biochemical Data, Medical Tests and Procedures: electrolyte and renal panel, gastrointestinal profile, glucose/endocrine profile, inflammatory profile, lipid profile  Nutrition-Focused Physical Findings: overall appearance       Nutrition Follow-Up    RD Follow-up?: Yes    Mary Fernando RD, LDN

## 2023-12-24 NOTE — ASSESSMENT & PLAN NOTE
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 Was found to have GPCs in 1/4 bottles, BCID + strep spp. s/p EGD 12/22, procedure note with 9 cm mucosal tear. No clear perforation on imaging, awaiting additional studies. Suspect this is the source of bacteremia.     Recommendations  - follow up susceptibility of strep mitis/oralis  - if sensitive, recommend discharge on levaquin 750mg Q48H (suspension is available)  - if resistant to levaquin, recommend linezolid 600mg BID (recommend price check suspension prior to discharge. OK to crush tabs if suspension is too expensive)  - end date of abx is 1/6/23    Will sign off, call w/ questions

## 2023-12-25 LAB
BACTERIA BLD CULT: ABNORMAL

## 2023-12-25 NOTE — ASSESSMENT & PLAN NOTE
Patient has chronic liver disease due to  Von Gierke disease . They have a history of ascites/volume overload and hepatic encephalopathy. Their liver disease is decompensated due to ascites/volume overload and portal hypertension. Hepatology has been consulted. The patient is not on the liver transplant list. We will obtain daily CBC, CMP, and INR. Their most current Na-MELD is listed below.  MELD 3.0: 18 at 2023  2:38 AM  MELD-Na: 16 at 2023  2:38 AM  Calculated from:  Serum Creatinine: 1.1 mg/dL at 2023  2:38 AM  Serum Sodium: 136 mmol/L at 2023  2:38 AM  Total Bilirubin: 3.4 mg/dL at 2023  2:38 AM  Serum Albumin: 2.6 g/dL at 2023  2:38 AM  INR(ratio): 1.3 at 2023  2:38 AM  Age at listin years  Sex: Female at 2023  2:38 AM      - ammonia elevated but at baseline, no encephalopathy, cont lactulose titrate and hold for diarrhea  - hepatology consulted and following  - CT: Cirrhotic liver morphology with stigmata of portal hypertension including splenomegaly, collateral vessels, and trace ascites.   - liver u/s: Elevated intraparenchymal resistive indices which may be seen with rejection fluid overload, or chronic liver disease.   - unable to do para, not enough fluid for safe para, cont CTX  - cont sublingual prograf with daily levels. Discharge on home prograf f/u hepatology    Home

## 2023-12-25 NOTE — ASSESSMENT & PLAN NOTE
Patient with acute kidney injury/acute renal failure likely due to pre-renal azotemia due to dehydration JACQUE is currently improving. Baseline creatinine  1  - Labs reviewed- Renal function/electrolytes with Estimated Creatinine Clearance: 44.5 mL/min (based on SCr of 1.1 mg/dL). according to latest data. Monitor urine output and serial BMP and adjust therapy as needed. Avoid nephrotoxins and renally dose meds for GFR listed above.    Resolved at discharge

## 2023-12-25 NOTE — DISCHARGE SUMMARY
Swapnil Oscar - Telemetry [facility] Medicine  Discharge Summary      Patient Name: Elda Hernandez  MRN: [id number]  LUCY: [id number]  Patient Class: OP- Observation  Admission Date: 12/21/2023  Hospital Length of Stay: 0 days  Discharge Date and Time: 12/24/2023  6:30 PM  Attending Physician: No att. providers found   Discharging Provider: Sangita Hayes MD  Primary Care Provider: David Lorenzo MD  [facility] Medicine Team: [facility] HOSP MED L Sangita Hayes MD  Primary Care Team: [facility] HOSP MED     HPI:   Elda Hernandez is a 53 y.o. 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 Pt reports N/V with occasional bilious emesis. She reports being on liquid diet since discharge from her last hospital stay in 03/2023. Pt denies any sick contacts. She reports chills but no fevers and is on rejection medications for her transplant. She attempted Zofran at home without change to her N/V symptoms. Pt reports a fainting episode earlier this morning and isn't sure if she hit her head but reports headache.      Feels like food and pills get stuck.   Abd pain, N/V. Denies fever or dysuria.     Procedure(s) (LRB):  EGD (ESOPHAGOGASTRODUODENOSCOPY) (N/A)      Hospital Course:   Admitted for N/V abd pain. CT with abnormality at esophageal junction concern for obstruction, GI consulted. EGD showed ulceration with concern for perforation. Recommended CT with oral contrast which showed outpouching concerning for leak. CTS surgery consulted. Esophagram was negative for leak/perforation. Clears restarted and emesis improved.    Not enough fluid for para, cont CTX. Bcx + Strep, vanc started and ID consulted. Hepatology following given history of liver transplant.   Diuretics held for JACQUE and NPO, resumed JACQUE resolved.   Patient tolerating liquid diet, cont at discharge. Cont PPI and carafate 8  weeks with repeat EGD to assess ulcer after discharge, GI referral placed. Path pending at discharge.   Bacteremia +Strep STREPTOCOCCUS MITIS/ORALIS sensitive to levaquin, discharged to complete levaquin course. Repeat Bcx NGTD.   Stable for discharge with PCP, hepatology, GI followup.      Goals of Care Treatment Preferences:  Code Status: Full Code      Consults:   Consults (From admission, onward)          Status Ordering Provider     Inpatient consult to Midline team  Once        Provider:  (Not yet assigned)    Completed JÚNIOR FISCHER     Inpatient consult to Registered Dietitian/Nutritionist  Once        Provider:  (Not yet assigned)    Completed JÚNIOR FISCHER     Inpatient consult to Cardiothoracic Surgery  Once        Provider:  (Not yet assigned)    Completed JÚNIOR FISCHER     Inpatient consult to Infectious Diseases  Once        Provider:  (Not yet assigned)    Completed JÚNIOR FISCHER     Inpatient consult to Gastroenterology  Once        Provider:  (Not yet assigned)    Completed JÚNIOR FISCHER     Inpatient consult to Hepatology  Once        Provider:  (Not yet assigned)    Completed JÚNIOR FISCHER            Renal/  JACQUE (acute kidney injury)  Patient with acute kidney injury/acute renal failure likely due to pre-renal azotemia due to dehydration JACQUE is currently improving. Baseline creatinine  1  - Labs reviewed- Renal function/electrolytes with Estimated Creatinine Clearance: 44.5 mL/min (based on SCr of 1.1 mg/dL). according to latest data. Monitor urine output and serial BMP and adjust therapy as needed. Avoid nephrotoxins and renally dose meds for GFR listed above.    Resolved at discharge        ID  Bacteremia  - Bcx +Strep viridans, started vanc  - STREPTOCOCCUS MITIS/ORALIS sensitive to levaquin  - ID consulted- recommend cont CTX/Vanc- transition to levaquin at discharge to complete course  - repeat Bcx NGTD      Endocrine  Hypothyroidism  - cont home synthroid      GI  * Ulcer of  esophagus without bleeding  - seen on EGD by GI  - biopsied- path pending   - cont PPI BID and carafate 8 weeks with repeat EGD GI referral at discharge       Esophageal dysphagia  - long standing history of dysphasia to solids, had been tolerating liquids  - now with N/V intolerance of all PO  - CT with Significant distension of the lower esophagus with fluid and debris, noting a prominent hyperdense structure at the gastroesophageal junction which could result in partial obstruction of the esophagus   - GI consulted and following- now signed off. Recommend PPI BID   - EGD with ulceration and concern for perforation- CT with oral contrast showing Upper esophagus extraluminal air foci concerning for leak.  Additional more distal esophageal contrast outpouching which may represent esophageal ulcer however can also represent areas of contained leak.  Recommend further evaluation with water-soluble esophagram.   - CTS consulted  - esophogram done and neg for perforation/leak  - antiemetics scheduled, N/V improved  - started clears   - tolerating liquid diet at discharge. Cont PPI BID and carafte 8 weeks with repeat EGD, path pending at discharge. Discharged on antiemetic regimen        Decompensated liver disease  - see chronic rejection of liver transplant   - hepatology following      Chronic rejection of liver transplant  Patient has chronic liver disease due to  Von Gierke disease . They have a history of ascites/volume overload and hepatic encephalopathy. Their liver disease is decompensated due to ascites/volume overload and portal hypertension. Hepatology has been consulted. The patient is not on the liver transplant list. We will obtain daily CBC, CMP, and INR. Their most current Na-MELD is listed below.  MELD 3.0: 18 at 12/24/2023  2:38 AM  MELD-Na: 16 at 12/24/2023  2:38 AM  Calculated from:  Serum Creatinine: 1.1 mg/dL at 12/24/2023  2:38 AM  Serum Sodium: 136 mmol/L at 12/24/2023  2:38 AM  Total Bilirubin:  3.4 mg/dL at 2023  2:38 AM  Serum Albumin: 2.6 g/dL at 2023  2:38 AM  INR(ratio): 1.3 at 2023  2:38 AM  Age at listin years  Sex: Female at 2023  2:38 AM      - ammonia elevated but at baseline, no encephalopathy, cont lactulose titrate and hold for diarrhea  - hepatology consulted and following  - CT: Cirrhotic liver morphology with stigmata of portal hypertension including splenomegaly, collateral vessels, and trace ascites.   - liver u/s: Elevated intraparenchymal resistive indices which may be seen with rejection fluid overload, or chronic liver disease.   - unable to do para, not enough fluid for safe para, cont CTX  - cont sublingual prograf with daily levels. Discharge on home prograf f/u hepatology     Elevated liver enzymes  - elevated liver enzymes in liver transplant patient - downtrending   - hepatology consulted      Abdominal pain  - para ordered concern for SBP given known ascites and liver disease- unable to do para, not enough fluid  - elevated procal and WBC from baseline, known immunosuppression   - started on CTX, vanc  - blood cultures + see bacteremia, UA noninfectious       S/P liver transplant 02 for von Gierke disease  - noted, hepatology following  - cont tacro with daily levels  - sublingual tac 0.5 BID- transition back to home dose at discharge         Palliative Care  Long-term use of immunosuppressant medication  - cont sublingual tacro with daily levels        Final Active Diagnoses:    Diagnosis Date Noted POA    PRINCIPAL PROBLEM:  Ulcer of esophagus without bleeding [K22.10] 2023 Yes    Bacteremia [R78.81] 2023 Yes    Esophageal dysphagia [R13.19] 2023 Yes    Decompensated liver disease [K74.69] 2021 Yes    JACQUE (acute kidney injury) [N17.9] 2020 Yes    Long-term use of immunosuppressant medication [Z79.60]  Not Applicable    Chronic rejection of liver transplant [T86.41] 2015 Yes    Elevated liver enzymes [R74.8]  12/29/2014 Yes    Abdominal pain [R10.9] 10/19/2013 Yes    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 [Z94.4] 10/12/2013 Not Applicable     Chronic    Hypothyroidism [E03.9] 10/12/2013 Yes     Chronic      Problems Resolved During this Admission:       Discharged Condition: stable    Disposition: Home or Self Care    Follow Up:   Follow-up Information       David Lorenzo MD. Schedule an appointment as soon as possible for a visit in 1 week(s).    Specialties: Internal Medicine, Family Medicine  Contact information:  [address] [place] [address]  [phone]                           Patient Instructions:      Ambulatory referral/consult to Gastroenterology   Standing Status: Future   Referral Priority: Urgent Referral Type: Consultation   Referral Reason: Specialty Services Required   Requested Specialty: Gastroenterology   Number of Visits Requested: 1     Ambulatory referral/consult to Hepatology   Standing Status: Future   Referral Priority: Urgent Referral Type: Consultation   Referral Reason: Specialty Services Required   Requested Specialty: Hepatology   Number of Visits Requested: 1     Diet full liquid     Notify your health care provider if you experience any of the following:  temperature >100.4     Notify your health care provider if you experience any of the following:  persistent nausea and vomiting or diarrhea     Notify your health care provider if you experience any of the following:  severe uncontrolled pain     Activity as tolerated       Significant Diagnostic Studies: Labs: CMP   Recent Labs   Lab 12/24/23 0238      K 4.1      CO2 22*   GLU 86   BUN 25*   CREATININE 1.1   CALCIUM 8.7   PROT 6.1   ALBUMIN 2.6*   BILITOT 3.4*   ALKPHOS 478*   AST 62*   ALT 52*   ANIONGAP 8   , CBC   Recent Labs   Lab 12/24/23 0238   WBC 3.08*   HGB 9.0*   HCT 27.2*   PLT 83*   , INR   Lab Results   Component Value Date    INR 1.3 (H) 12/24/2023    INR 1.3 (H) 12/23/2023    INR 1.3  (H) 12/22/2023   , and All labs within the past 24 hours have been reviewed  Microbiology: Blood Culture   Lab Results   Component Value Date    LABBLOO No Growth to date 12/23/2023    LABBLOO No Growth to date 12/23/2023    LABBLOO No Growth to date 12/23/2023       Pending Diagnostic Studies:       Procedure Component Value Units Date/Time    Specimen to Pathology, Surgery Gastrointestinal tract [[id number]] Collected: 12/22/23 1006    Order Status: Sent Lab Status: In process Updated: 12/22/23 1027    Specimen: Tissue            Medications:  Reconciled Home Medications:      Medication List      Some of the medications listed here do not show instructions, such as how often to take the medication. Ask your doctor or nurse how to use these medications.  Specifically ask about these and similar medications:   spironolactone (ALDACTONE) 50 MG tablet  furosemide (LASIX) 20 MG tablet       START taking these medications      levoFLOXacin 250 mg/10 mL Soln  Commonly known as: LEVAQUIN  Take 30 mLs (750 mg total) by mouth every 48 hours. for 13 days     oxyCODONE 5 MG immediate release tablet  Commonly known as: ROXICODONE  Take 1 tablet (5 mg total) by mouth every 12 (twelve) hours as needed for Pain.     pantoprazole 20 MG tablet  Commonly known as: PROTONIX  Take 1 tablet (20 mg total) by mouth 2 (two) times daily before meals.     sucralfate 100 mg/mL suspension  Commonly known as: CARAFATE  Take 10 mLs (1 g total) by mouth every 6 (six) hours.            CHANGE how you take these medications      multivitamin capsule  Take 1 capsule by mouth once daily.  What changed: Another medication with the same name was removed. Continue taking this medication, and follow the directions you see here.     ondansetron 4 MG Tbdl  Commonly known as: ZOFRAN-ODT  Take 1 tablet (4 mg total) by mouth every 6 (six) hours as needed (emesis, nausea).  What changed: See the new instructions.     promethazine 25 MG tablet  Commonly known  as: PHENERGAN  Take 1 tablet (25 mg total) by mouth every 6 (six) hours as needed for Nausea (if not relieved with zofran).  What changed: reasons to take this     valACYclovir 1000 MG tablet  Commonly known as: VALTREX  TAKE 2 TABLETS(2000 MG) BY MOUTH TWICE DAILY FOR 2 DOSES  What changed: additional instructions            CONTINUE taking these medications      butalbitaL-acetaminophen  mg Tab  TAKE 1 TABLET BY MOUTH TWICE DAILY AS NEEDED FOR HEADACHE     calcium carbonate 200 mg calcium (500 mg) chewable tablet  Commonly known as: TUMS  Take 1 tablet (500 mg total) by mouth 2 (two) times daily as needed.     CALTRATE GUMMY BITES ORAL  Take by mouth.     clonazePAM 0.5 MG tablet  Commonly known as: KlonoPIN  Take 1 tablet (0.5 mg total) by mouth 2 (two) times daily.     DENAVIR 1 % cream  Generic drug: penciclovir  RICHAR EXT AA Q 2 H FOR 4 DAYS     fluorometholone 0.1% 0.1 % Drps  Commonly known as: FML  Place 2 drops into the left eye 2 (two) times daily.     furosemide 20 MG tablet  Commonly known as: LASIX  __________________________     hydrocortisone 2.5 % cream  Apply topically to affected area twice daily as needed     lactulose 10 gram/15 mL solution  Commonly known as: CHRONULAC  Take 15 mLs (10 g total) by mouth 3 (three) times daily.     levothyroxine 75 MCG tablet  Commonly known as: SYNTHROID  Take 1 tablet (75 mcg total) by mouth once daily.     magnesium oxide 400 mg (241.3 mg magnesium) tablet  Commonly known as: MAG-OX  TAKE 1 TABLET(400 MG) BY MOUTH TWICE DAILY     polyethylene glycol 17 gram/dose powder  Commonly known as: GLYCOLAX  MIX 17 GRAMS (1 capful) IN LIQUID AND DRINK BY MOUTH TWICE DAILY     pravastatin 20 MG tablet  Commonly known as: PRAVACHOL  Take 1 tablet (20 mg total) by mouth once daily.     prochlorperazine 10 MG tablet  Commonly known as: COMPAZINE  Take 1 tablet (10 mg total) by mouth every 6 (six) hours as needed (migraine or nausea).     ramipriL 5 MG capsule  Commonly  known as: ALTACE  Take 1 capsule (5 mg total) by mouth once daily.     rifAXIMin 550 mg Tab  Commonly known as: XIFAXAN  Take 1 tablet (550 mg total) by mouth 2 (two) times daily.     SHINGRIX (PF) 50 mcg/0.5 mL injection  Generic drug: varicella-zoster gE-AS01B (PF)     spironolactone 50 MG tablet  Commonly known as: ALDACTONE  __________________________     tacrolimus 0.5 MG Cap  Commonly known as: PROGRAF  Take 1 capsule (0.5 mg total) by mouth every 12 (twelve) hours.     UBRELVY 50 mg tablet  Generic drug: ubrogepant  Take 1 tablet po at onset of migraine. May repeat in 2 hours if needed. Max 2 tablets per day.     venlafaxine 150 MG Cp24  Commonly known as: EFFEXOR-XR  Take 1 capsule (150 mg total) by mouth once daily.            STOP taking these medications      acetaminophen 325 MG tablet  Commonly known as: TYLENOL              Indwelling Lines/Drains at time of discharge:   Lines/Drains/Airways       None                   Time spent on the discharge of patient: 40 minutes         Sangita Hayes MD  Department of Hospital Medicine  [facility] - Telemetry Stepdown

## 2023-12-25 NOTE — ASSESSMENT & PLAN NOTE
- Bcx +Strep viridans, started vanc  - STREPTOCOCCUS MITIS/ORALIS sensitive to levaquin  - ID consulted- recommend cont CTX/Vanc- transition to levaquin at discharge to complete course  - repeat Bcx NGTD

## 2023-12-25 NOTE — ASSESSMENT & PLAN NOTE
- noted, hepatology following  - cont tacro with daily levels  - sublingual tac 0.5 BID- transition back to home dose at discharge

## 2023-12-25 NOTE — ASSESSMENT & PLAN NOTE
- long standing history of dysphasia to solids, had been tolerating liquids  - now with N/V intolerance of all PO  - CT with Significant distension of the lower esophagus with fluid and debris, noting a prominent hyperdense structure at the gastroesophageal junction which could result in partial obstruction of the esophagus   - GI consulted and following- now signed off. Recommend PPI BID   - EGD with ulceration and concern for perforation- CT with oral contrast showing Upper esophagus extraluminal air foci concerning for leak.  Additional more distal esophageal contrast outpouching which may represent esophageal ulcer however can also represent areas of contained leak.  Recommend further evaluation with water-soluble esophagram.   - CTS consulted  - esophogram done and neg for perforation/leak  - antiemetics scheduled, N/V improved  - started clears   - tolerating liquid diet at discharge. Cont PPI BID and carafte 8 weeks with repeat EGD, path pending at discharge. Discharged on antiemetic regimen

## 2023-12-25 NOTE — ASSESSMENT & PLAN NOTE
- seen on EGD by GI  - biopsied- path pending   - cont PPI BID and carafate 8 weeks with repeat EGD GI referral at discharge

## 2023-12-26 DIAGNOSIS — Z94.4 LIVER REPLACED BY TRANSPLANT: Primary | ICD-10-CM

## 2023-12-26 LAB — BACTERIA BLD CULT: NORMAL

## 2023-12-27 ENCOUNTER — TELEPHONE (OUTPATIENT)
Dept: GASTROENTEROLOGY | Facility: CLINIC | Age: 53
End: 2023-12-27
Payer: MEDICARE

## 2023-12-28 LAB
BACTERIA BLD CULT: NORMAL
BACTERIA BLD CULT: NORMAL
FINAL PATHOLOGIC DIAGNOSIS: NORMAL
GROSS: NORMAL
Lab: NORMAL
MICROSCOPIC EXAM: NORMAL

## 2023-12-29 ENCOUNTER — PATIENT MESSAGE (OUTPATIENT)
Dept: TRANSPLANT | Facility: CLINIC | Age: 53
End: 2023-12-29
Payer: MEDICARE

## 2023-12-30 ENCOUNTER — LAB VISIT (OUTPATIENT)
Dept: LAB | Facility: HOSPITAL | Age: 53
End: 2023-12-30
Attending: INTERNAL MEDICINE
Payer: MEDICARE

## 2023-12-30 DIAGNOSIS — Z94.4 LIVER REPLACED BY TRANSPLANT: ICD-10-CM

## 2023-12-30 LAB
ALBUMIN SERPL BCP-MCNC: 2.9 G/DL (ref 3.5–5.2)
ALP SERPL-CCNC: 610 U/L (ref 55–135)
ALT SERPL W/O P-5'-P-CCNC: 53 U/L (ref 10–44)
ANION GAP SERPL CALC-SCNC: 9 MMOL/L (ref 8–16)
AST SERPL-CCNC: 81 U/L (ref 10–40)
BASOPHILS # BLD AUTO: 0.03 K/UL (ref 0–0.2)
BASOPHILS NFR BLD: 0.9 % (ref 0–1.9)
BILIRUB SERPL-MCNC: 3.3 MG/DL (ref 0.1–1)
BUN SERPL-MCNC: 31 MG/DL (ref 6–20)
CALCIUM SERPL-MCNC: 9.1 MG/DL (ref 8.7–10.5)
CHLORIDE SERPL-SCNC: 102 MMOL/L (ref 95–110)
CO2 SERPL-SCNC: 24 MMOL/L (ref 23–29)
CREAT SERPL-MCNC: 1.3 MG/DL (ref 0.5–1.4)
DIFFERENTIAL METHOD BLD: ABNORMAL
EOSINOPHIL # BLD AUTO: 0.2 K/UL (ref 0–0.5)
EOSINOPHIL NFR BLD: 5.2 % (ref 0–8)
ERYTHROCYTE [DISTWIDTH] IN BLOOD BY AUTOMATED COUNT: 14.3 % (ref 11.5–14.5)
EST. GFR  (NO RACE VARIABLE): 48.6 ML/MIN/1.73 M^2
GLUCOSE SERPL-MCNC: 94 MG/DL (ref 70–110)
HCT VFR BLD AUTO: 30.8 % (ref 37–48.5)
HGB BLD-MCNC: 10.5 G/DL (ref 12–16)
IMM GRANULOCYTES # BLD AUTO: 0.01 K/UL (ref 0–0.04)
IMM GRANULOCYTES NFR BLD AUTO: 0.3 % (ref 0–0.5)
INR PPP: 1.2 (ref 0.8–1.2)
LYMPHOCYTES # BLD AUTO: 0.6 K/UL (ref 1–4.8)
LYMPHOCYTES NFR BLD: 15.9 % (ref 18–48)
MCH RBC QN AUTO: 30.3 PG (ref 27–31)
MCHC RBC AUTO-ENTMCNC: 34.1 G/DL (ref 32–36)
MCV RBC AUTO: 89 FL (ref 82–98)
MONOCYTES # BLD AUTO: 0.5 K/UL (ref 0.3–1)
MONOCYTES NFR BLD: 14.4 % (ref 4–15)
NEUTROPHILS # BLD AUTO: 2.2 K/UL (ref 1.8–7.7)
NEUTROPHILS NFR BLD: 63.3 % (ref 38–73)
NRBC BLD-RTO: 0 /100 WBC
PLATELET # BLD AUTO: 81 K/UL (ref 150–450)
PMV BLD AUTO: 11.5 FL (ref 9.2–12.9)
POTASSIUM SERPL-SCNC: 4.9 MMOL/L (ref 3.5–5.1)
PROT SERPL-MCNC: 7 G/DL (ref 6–8.4)
PROTHROMBIN TIME: 12.6 SEC (ref 9–12.5)
RBC # BLD AUTO: 3.46 M/UL (ref 4–5.4)
SODIUM SERPL-SCNC: 135 MMOL/L (ref 136–145)
WBC # BLD AUTO: 3.47 K/UL (ref 3.9–12.7)

## 2023-12-30 PROCEDURE — 36415 COLL VENOUS BLD VENIPUNCTURE: CPT | Performed by: INTERNAL MEDICINE

## 2023-12-30 PROCEDURE — 85025 COMPLETE CBC W/AUTO DIFF WBC: CPT | Performed by: INTERNAL MEDICINE

## 2023-12-30 PROCEDURE — 80197 ASSAY OF TACROLIMUS: CPT | Performed by: INTERNAL MEDICINE

## 2023-12-30 PROCEDURE — 80053 COMPREHEN METABOLIC PANEL: CPT | Performed by: INTERNAL MEDICINE

## 2023-12-30 PROCEDURE — 85610 PROTHROMBIN TIME: CPT | Performed by: INTERNAL MEDICINE

## 2023-12-31 LAB — TACROLIMUS BLD-MCNC: 7 NG/ML (ref 5–15)

## 2024-01-02 ENCOUNTER — PATIENT MESSAGE (OUTPATIENT)
Dept: TRANSPLANT | Facility: CLINIC | Age: 54
End: 2024-01-02
Payer: MEDICARE

## 2024-01-02 ENCOUNTER — TELEPHONE (OUTPATIENT)
Dept: TRANSPLANT | Facility: CLINIC | Age: 54
End: 2024-01-02
Payer: MEDICARE

## 2024-01-02 NOTE — TELEPHONE ENCOUNTER
Sent message to let patient know to get labs on 1/27/24 as previously scheduled.    ----- Message from Jaya Nielsen MD sent at 1/2/2024  7:22 AM CST -----  Results reviewed. No action.

## 2024-01-08 ENCOUNTER — HOSPITAL ENCOUNTER (OUTPATIENT)
Facility: HOSPITAL | Age: 54
Discharge: HOME | End: 2024-01-10
Attending: EMERGENCY MEDICINE | Admitting: STUDENT IN AN ORGANIZED HEALTH CARE EDUCATION/TRAINING PROGRAM
Payer: MEDICARE

## 2024-01-08 DIAGNOSIS — Z94.4 S/P LIVER TRANSPLANT: Chronic | ICD-10-CM

## 2024-01-08 DIAGNOSIS — R11.0 NAUSEA: ICD-10-CM

## 2024-01-08 DIAGNOSIS — R53.1 WEAKNESS: ICD-10-CM

## 2024-01-08 DIAGNOSIS — G93.40 ENCEPHALOPATHY: ICD-10-CM

## 2024-01-08 DIAGNOSIS — R18.8 OTHER ASCITES: ICD-10-CM

## 2024-01-08 DIAGNOSIS — R07.9 CHEST PAIN: ICD-10-CM

## 2024-01-08 DIAGNOSIS — E86.0 DEHYDRATION: Primary | ICD-10-CM

## 2024-01-08 PROBLEM — D61.818 PANCYTOPENIA: Chronic | Status: ACTIVE | Noted: 2023-08-16

## 2024-01-08 PROBLEM — K22.10 ULCER OF ESOPHAGUS WITHOUT BLEEDING: Chronic | Status: ACTIVE | Noted: 2023-12-23

## 2024-01-08 PROBLEM — K74.60 CIRRHOSIS OF LIVER WITH ASCITES: Chronic | Status: ACTIVE | Noted: 2022-03-25

## 2024-01-08 PROBLEM — N18.31 STAGE 3A CHRONIC KIDNEY DISEASE: Chronic | Status: ACTIVE | Noted: 2023-09-27

## 2024-01-08 PROBLEM — K20.90 ESOPHAGITIS: Chronic | Status: ACTIVE | Noted: 2020-09-04

## 2024-01-08 PROBLEM — E87.1 HYPONATREMIA: Chronic | Status: ACTIVE | Noted: 2023-03-04

## 2024-01-08 LAB
ALBUMIN SERPL BCP-MCNC: 2.7 G/DL (ref 3.5–5.2)
ALP SERPL-CCNC: 486 U/L (ref 55–135)
ALT SERPL W/O P-5'-P-CCNC: 47 U/L (ref 10–44)
AMMONIA PLAS-SCNC: 49 UMOL/L (ref 10–50)
ANION GAP SERPL CALC-SCNC: 8 MMOL/L (ref 8–16)
AST SERPL-CCNC: 71 U/L (ref 10–40)
BASOPHILS # BLD AUTO: 0.03 K/UL (ref 0–0.2)
BASOPHILS NFR BLD: 1 % (ref 0–1.9)
BILIRUB SERPL-MCNC: 2.9 MG/DL (ref 0.1–1)
BILIRUB UR QL STRIP: NEGATIVE
BUN SERPL-MCNC: 35 MG/DL (ref 6–30)
BUN SERPL-MCNC: 39 MG/DL (ref 6–20)
CALCIUM SERPL-MCNC: 8.8 MG/DL (ref 8.7–10.5)
CHLORIDE SERPL-SCNC: 104 MMOL/L (ref 95–110)
CHLORIDE SERPL-SCNC: 106 MMOL/L (ref 95–110)
CLARITY UR REFRACT.AUTO: CLEAR
CO2 SERPL-SCNC: 20 MMOL/L (ref 23–29)
COLOR UR AUTO: YELLOW
CREAT SERPL-MCNC: 1.3 MG/DL (ref 0.5–1.4)
CREAT SERPL-MCNC: 1.4 MG/DL (ref 0.5–1.4)
CREAT UR-MCNC: 57 MG/DL (ref 15–325)
DIFFERENTIAL METHOD BLD: ABNORMAL
EOSINOPHIL # BLD AUTO: 0.1 K/UL (ref 0–0.5)
EOSINOPHIL NFR BLD: 3.2 % (ref 0–8)
ERYTHROCYTE [DISTWIDTH] IN BLOOD BY AUTOMATED COUNT: 14.3 % (ref 11.5–14.5)
EST. GFR  (NO RACE VARIABLE): 48.6 ML/MIN/1.73 M^2
GLUCOSE SERPL-MCNC: 95 MG/DL (ref 70–110)
GLUCOSE SERPL-MCNC: 97 MG/DL (ref 70–110)
GLUCOSE UR QL STRIP: NEGATIVE
HCT VFR BLD AUTO: 28.7 % (ref 37–48.5)
HCT VFR BLD CALC: 26 %PCV (ref 36–54)
HGB BLD-MCNC: 10.1 G/DL (ref 12–16)
HGB UR QL STRIP: NEGATIVE
IMM GRANULOCYTES # BLD AUTO: 0.01 K/UL (ref 0–0.04)
IMM GRANULOCYTES NFR BLD AUTO: 0.3 % (ref 0–0.5)
INFLUENZA A, MOLECULAR: NEGATIVE
INFLUENZA B, MOLECULAR: NEGATIVE
KETONES UR QL STRIP: NEGATIVE
LEUKOCYTE ESTERASE UR QL STRIP: NEGATIVE
LYMPHOCYTES # BLD AUTO: 0.7 K/UL (ref 1–4.8)
LYMPHOCYTES NFR BLD: 21.5 % (ref 18–48)
MAGNESIUM SERPL-MCNC: 1.9 MG/DL (ref 1.6–2.6)
MCH RBC QN AUTO: 32.1 PG (ref 27–31)
MCHC RBC AUTO-ENTMCNC: 35.2 G/DL (ref 32–36)
MCV RBC AUTO: 91 FL (ref 82–98)
MONOCYTES # BLD AUTO: 0.5 K/UL (ref 0.3–1)
MONOCYTES NFR BLD: 16 % (ref 4–15)
NEUTROPHILS # BLD AUTO: 1.8 K/UL (ref 1.8–7.7)
NEUTROPHILS NFR BLD: 58 % (ref 38–73)
NITRITE UR QL STRIP: NEGATIVE
NRBC BLD-RTO: 0 /100 WBC
OSMOLALITY SERPL: 302 MOSM/KG (ref 275–295)
PH UR STRIP: 7 [PH] (ref 5–8)
PHOSPHATE SERPL-MCNC: 2.8 MG/DL (ref 2.7–4.5)
PLATELET # BLD AUTO: 58 K/UL (ref 150–450)
PMV BLD AUTO: 11.7 FL (ref 9.2–12.9)
POC IONIZED CALCIUM: 1.17 MMOL/L (ref 1.06–1.42)
POC TCO2 (MEASURED): 21 MMOL/L (ref 23–27)
POTASSIUM BLD-SCNC: 4.2 MMOL/L (ref 3.5–5.1)
POTASSIUM SERPL-SCNC: 4.3 MMOL/L (ref 3.5–5.1)
PROT SERPL-MCNC: 6.6 G/DL (ref 6–8.4)
PROT UR QL STRIP: ABNORMAL
RBC # BLD AUTO: 3.15 M/UL (ref 4–5.4)
SAMPLE: ABNORMAL
SARS-COV-2 RDRP RESP QL NAA+PROBE: NEGATIVE
SODIUM BLD-SCNC: 135 MMOL/L (ref 136–145)
SODIUM SERPL-SCNC: 134 MMOL/L (ref 136–145)
SODIUM UR-SCNC: 18 MMOL/L (ref 20–250)
SP GR UR STRIP: 1.02 (ref 1–1.03)
SPECIMEN SOURCE: NORMAL
TROPONIN I SERPL DL<=0.01 NG/ML-MCNC: <0.006 NG/ML (ref 0–0.03)
TSH SERPL DL<=0.005 MIU/L-ACNC: 3.56 UIU/ML (ref 0.4–4)
URN SPEC COLLECT METH UR: ABNORMAL
WBC # BLD AUTO: 3.12 K/UL (ref 3.9–12.7)

## 2024-01-08 PROCEDURE — 82140 ASSAY OF AMMONIA: CPT | Performed by: EMERGENCY MEDICINE

## 2024-01-08 PROCEDURE — 99285 EMERGENCY DEPT VISIT HI MDM: CPT | Mod: 25

## 2024-01-08 PROCEDURE — 87040 BLOOD CULTURE FOR BACTERIA: CPT | Mod: 59 | Performed by: STUDENT IN AN ORGANIZED HEALTH CARE EDUCATION/TRAINING PROGRAM

## 2024-01-08 PROCEDURE — 82330 ASSAY OF CALCIUM: CPT

## 2024-01-08 PROCEDURE — 25000003 PHARM REV CODE 250: Performed by: STUDENT IN AN ORGANIZED HEALTH CARE EDUCATION/TRAINING PROGRAM

## 2024-01-08 PROCEDURE — A4216 STERILE WATER/SALINE, 10 ML: HCPCS | Performed by: EMERGENCY MEDICINE

## 2024-01-08 PROCEDURE — 63600175 PHARM REV CODE 636 W HCPCS: Performed by: STUDENT IN AN ORGANIZED HEALTH CARE EDUCATION/TRAINING PROGRAM

## 2024-01-08 PROCEDURE — 96376 TX/PRO/DX INJ SAME DRUG ADON: CPT

## 2024-01-08 PROCEDURE — 63600175 PHARM REV CODE 636 W HCPCS: Performed by: EMERGENCY MEDICINE

## 2024-01-08 PROCEDURE — 85025 COMPLETE CBC W/AUTO DIFF WBC: CPT | Performed by: EMERGENCY MEDICINE

## 2024-01-08 PROCEDURE — C1751 CATH, INF, PER/CENT/MIDLINE: HCPCS

## 2024-01-08 PROCEDURE — 81003 URINALYSIS AUTO W/O SCOPE: CPT | Performed by: EMERGENCY MEDICINE

## 2024-01-08 PROCEDURE — 76937 US GUIDE VASCULAR ACCESS: CPT

## 2024-01-08 PROCEDURE — 96365 THER/PROPH/DIAG IV INF INIT: CPT

## 2024-01-08 PROCEDURE — 80053 COMPREHEN METABOLIC PANEL: CPT | Performed by: EMERGENCY MEDICINE

## 2024-01-08 PROCEDURE — 84100 ASSAY OF PHOSPHORUS: CPT | Performed by: EMERGENCY MEDICINE

## 2024-01-08 PROCEDURE — 84484 ASSAY OF TROPONIN QUANT: CPT | Performed by: EMERGENCY MEDICINE

## 2024-01-08 PROCEDURE — 93010 ELECTROCARDIOGRAM REPORT: CPT | Mod: ,,, | Performed by: INTERNAL MEDICINE

## 2024-01-08 PROCEDURE — 82570 ASSAY OF URINE CREATININE: CPT | Performed by: EMERGENCY MEDICINE

## 2024-01-08 PROCEDURE — 96361 HYDRATE IV INFUSION ADD-ON: CPT

## 2024-01-08 PROCEDURE — 83735 ASSAY OF MAGNESIUM: CPT | Performed by: EMERGENCY MEDICINE

## 2024-01-08 PROCEDURE — 84300 ASSAY OF URINE SODIUM: CPT | Performed by: EMERGENCY MEDICINE

## 2024-01-08 PROCEDURE — G0378 HOSPITAL OBSERVATION PER HR: HCPCS

## 2024-01-08 PROCEDURE — 96375 TX/PRO/DX INJ NEW DRUG ADDON: CPT

## 2024-01-08 PROCEDURE — 80197 ASSAY OF TACROLIMUS: CPT | Performed by: EMERGENCY MEDICINE

## 2024-01-08 PROCEDURE — 36410 VNPNXR 3YR/> PHY/QHP DX/THER: CPT

## 2024-01-08 PROCEDURE — U0002 COVID-19 LAB TEST NON-CDC: HCPCS | Performed by: STUDENT IN AN ORGANIZED HEALTH CARE EDUCATION/TRAINING PROGRAM

## 2024-01-08 PROCEDURE — 25000003 PHARM REV CODE 250: Performed by: EMERGENCY MEDICINE

## 2024-01-08 PROCEDURE — 87502 INFLUENZA DNA AMP PROBE: CPT | Performed by: STUDENT IN AN ORGANIZED HEALTH CARE EDUCATION/TRAINING PROGRAM

## 2024-01-08 PROCEDURE — 80047 BASIC METABLC PNL IONIZED CA: CPT

## 2024-01-08 PROCEDURE — 93005 ELECTROCARDIOGRAM TRACING: CPT

## 2024-01-08 PROCEDURE — 84443 ASSAY THYROID STIM HORMONE: CPT | Performed by: EMERGENCY MEDICINE

## 2024-01-08 PROCEDURE — 83930 ASSAY OF BLOOD OSMOLALITY: CPT | Performed by: EMERGENCY MEDICINE

## 2024-01-08 RX ORDER — SODIUM CHLORIDE 0.9 % (FLUSH) 0.9 %
10 SYRINGE (ML) INJECTION
Status: DISCONTINUED | OUTPATIENT
Start: 2024-01-08 | End: 2024-01-10 | Stop reason: HOSPADM

## 2024-01-08 RX ORDER — VENLAFAXINE HYDROCHLORIDE 75 MG/1
150 CAPSULE, EXTENDED RELEASE ORAL DAILY
Status: DISCONTINUED | OUTPATIENT
Start: 2024-01-09 | End: 2024-01-10 | Stop reason: HOSPADM

## 2024-01-08 RX ORDER — FUROSEMIDE 20 MG/1
20 TABLET ORAL DAILY
Status: DISCONTINUED | OUTPATIENT
Start: 2024-01-09 | End: 2024-01-10 | Stop reason: HOSPADM

## 2024-01-08 RX ORDER — LANOLIN ALCOHOL/MO/W.PET/CERES
400 CREAM (GRAM) TOPICAL 2 TIMES DAILY
Status: DISCONTINUED | OUTPATIENT
Start: 2024-01-08 | End: 2024-01-10 | Stop reason: HOSPADM

## 2024-01-08 RX ORDER — TALC
6 POWDER (GRAM) TOPICAL NIGHTLY PRN
Status: DISCONTINUED | OUTPATIENT
Start: 2024-01-08 | End: 2024-01-10 | Stop reason: HOSPADM

## 2024-01-08 RX ORDER — SUCRALFATE 1 G/10ML
1 SUSPENSION ORAL EVERY 6 HOURS
Status: DISCONTINUED | OUTPATIENT
Start: 2024-01-09 | End: 2024-01-10 | Stop reason: HOSPADM

## 2024-01-08 RX ORDER — LEVOTHYROXINE SODIUM 75 UG/1
75 TABLET ORAL DAILY
Status: DISCONTINUED | OUTPATIENT
Start: 2024-01-09 | End: 2024-01-10 | Stop reason: HOSPADM

## 2024-01-08 RX ORDER — ACETAMINOPHEN 325 MG/1
650 TABLET ORAL EVERY 4 HOURS PRN
Status: DISCONTINUED | OUTPATIENT
Start: 2024-01-08 | End: 2024-01-10 | Stop reason: HOSPADM

## 2024-01-08 RX ORDER — SODIUM CHLORIDE 0.9 % (FLUSH) 0.9 %
1-10 SYRINGE (ML) INJECTION EVERY 12 HOURS PRN
Status: DISCONTINUED | OUTPATIENT
Start: 2024-01-08 | End: 2024-01-10 | Stop reason: HOSPADM

## 2024-01-08 RX ORDER — SODIUM CHLORIDE 0.9 % (FLUSH) 0.9 %
10 SYRINGE (ML) INJECTION EVERY 6 HOURS
Status: DISCONTINUED | OUTPATIENT
Start: 2024-01-08 | End: 2024-01-10 | Stop reason: HOSPADM

## 2024-01-08 RX ORDER — ENOXAPARIN SODIUM 100 MG/ML
40 INJECTION SUBCUTANEOUS EVERY 24 HOURS
Status: DISCONTINUED | OUTPATIENT
Start: 2024-01-09 | End: 2024-01-10 | Stop reason: HOSPADM

## 2024-01-08 RX ORDER — TACROLIMUS 0.5 MG/1
0.5 CAPSULE ORAL 2 TIMES DAILY
Status: DISCONTINUED | OUTPATIENT
Start: 2024-01-08 | End: 2024-01-10 | Stop reason: HOSPADM

## 2024-01-08 RX ORDER — NALOXONE HCL 0.4 MG/ML
0.02 VIAL (ML) INJECTION
Status: DISCONTINUED | OUTPATIENT
Start: 2024-01-08 | End: 2024-01-10 | Stop reason: HOSPADM

## 2024-01-08 RX ORDER — SIMETHICONE 80 MG
1 TABLET,CHEWABLE ORAL 4 TIMES DAILY PRN
Status: DISCONTINUED | OUTPATIENT
Start: 2024-01-08 | End: 2024-01-10 | Stop reason: HOSPADM

## 2024-01-08 RX ORDER — LACTULOSE 10 G/15ML
10 SOLUTION ORAL 3 TIMES DAILY
Status: DISCONTINUED | OUTPATIENT
Start: 2024-01-08 | End: 2024-01-10 | Stop reason: HOSPADM

## 2024-01-08 RX ORDER — MORPHINE SULFATE 4 MG/ML
4 INJECTION, SOLUTION INTRAMUSCULAR; INTRAVENOUS
Status: COMPLETED | OUTPATIENT
Start: 2024-01-08 | End: 2024-01-08

## 2024-01-08 RX ORDER — ONDANSETRON 8 MG/1
8 TABLET, ORALLY DISINTEGRATING ORAL EVERY 8 HOURS PRN
Status: DISCONTINUED | OUTPATIENT
Start: 2024-01-08 | End: 2024-01-10 | Stop reason: HOSPADM

## 2024-01-08 RX ORDER — POLYETHYLENE GLYCOL 3350 17 G/17G
17 POWDER, FOR SOLUTION ORAL DAILY PRN
Status: DISCONTINUED | OUTPATIENT
Start: 2024-01-08 | End: 2024-01-10 | Stop reason: HOSPADM

## 2024-01-08 RX ORDER — PROCHLORPERAZINE EDISYLATE 5 MG/ML
5 INJECTION INTRAMUSCULAR; INTRAVENOUS EVERY 6 HOURS PRN
Status: DISCONTINUED | OUTPATIENT
Start: 2024-01-08 | End: 2024-01-09

## 2024-01-08 RX ORDER — HYDROMORPHONE HYDROCHLORIDE 1 MG/ML
0.5 INJECTION, SOLUTION INTRAMUSCULAR; INTRAVENOUS; SUBCUTANEOUS
Status: COMPLETED | OUTPATIENT
Start: 2024-01-08 | End: 2024-01-08

## 2024-01-08 RX ORDER — TALC
6 POWDER (GRAM) TOPICAL NIGHTLY PRN
Status: DISCONTINUED | OUTPATIENT
Start: 2024-01-08 | End: 2024-01-08 | Stop reason: SDUPTHER

## 2024-01-08 RX ORDER — ACETAMINOPHEN 325 MG/1
650 TABLET ORAL EVERY 8 HOURS PRN
Status: DISCONTINUED | OUTPATIENT
Start: 2024-01-08 | End: 2024-01-10 | Stop reason: HOSPADM

## 2024-01-08 RX ORDER — MAG HYDROX/ALUMINUM HYD/SIMETH 200-200-20
30 SUSPENSION, ORAL (FINAL DOSE FORM) ORAL 4 TIMES DAILY PRN
Status: DISCONTINUED | OUTPATIENT
Start: 2024-01-08 | End: 2024-01-10 | Stop reason: HOSPADM

## 2024-01-08 RX ORDER — BUTALBITAL, ACETAMINOPHEN AND CAFFEINE 50; 325; 40 MG/1; MG/1; MG/1
1 TABLET ORAL DAILY PRN
Status: DISCONTINUED | OUTPATIENT
Start: 2024-01-08 | End: 2024-01-10 | Stop reason: HOSPADM

## 2024-01-08 RX ADMIN — PROCHLORPERAZINE EDISYLATE 5 MG: 5 INJECTION INTRAMUSCULAR; INTRAVENOUS at 11:01

## 2024-01-08 RX ADMIN — RIFAXIMIN 550 MG: 550 TABLET ORAL at 10:01

## 2024-01-08 RX ADMIN — Medication 400 MG: at 10:01

## 2024-01-08 RX ADMIN — SODIUM CHLORIDE 500 ML: 0.9 INJECTION, SOLUTION INTRAVENOUS at 06:01

## 2024-01-08 RX ADMIN — TACROLIMUS 0.5 MG: 0.5 CAPSULE ORAL at 10:01

## 2024-01-08 RX ADMIN — Medication 10 ML: at 06:01

## 2024-01-08 RX ADMIN — LACTULOSE 10 G: 20 SOLUTION ORAL at 10:01

## 2024-01-08 RX ADMIN — MORPHINE SULFATE 4 MG: 4 INJECTION INTRAVENOUS at 04:01

## 2024-01-08 RX ADMIN — HYDROMORPHONE HYDROCHLORIDE 0.5 MG: 1 INJECTION, SOLUTION INTRAMUSCULAR; INTRAVENOUS; SUBCUTANEOUS at 07:01

## 2024-01-08 RX ADMIN — ONDANSETRON 8 MG: 8 TABLET, ORALLY DISINTEGRATING ORAL at 10:01

## 2024-01-08 RX ADMIN — HYDROMORPHONE HYDROCHLORIDE 0.5 MG: 1 INJECTION, SOLUTION INTRAMUSCULAR; INTRAVENOUS; SUBCUTANEOUS at 05:01

## 2024-01-08 RX ADMIN — CEFTRIAXONE SODIUM 1 G: 1 INJECTION, POWDER, FOR SOLUTION INTRAMUSCULAR; INTRAVENOUS at 10:01

## 2024-01-08 NOTE — ED TRIAGE NOTES
Elda Hernandez, a 53 y.o. female presents to the ED w/ complaint of headache and aphasia x3 days    Triage note:  Chief Complaint   Patient presents with    Headache    Aphasia     X 3 days; last time it happened her sodium was low     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    Arnold-Chiari malformation     Depression     Esophageal stricture     Essential tremor     Hypertension     Left bundle branch block     Liver fibrosis, transplanted liver 10/2/2018    Suggested on fibroscan 10/2/18    Migraine without aura     MVP (mitral valve prolapse)     Non-rheumatic mitral regurgitation 10/1/2018    Non-rheumatic tricuspid valve insufficiency 10/1/2018    Osteoporosis     Recurrent urinary tract infection     Seizures     Shingles 2007    SIADH (syndrome of inappropriate ADH production)     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    Tricuspid valve prolapse     Urolithiasis     Von Gierke disease     s/p liver transplant

## 2024-01-08 NOTE — ED NOTES
Attempted to start IV x2, unsuccessful attempts, MD is aware, PICC/Midline team will be consulted.

## 2024-01-08 NOTE — CONSULTS
Single lumen 18G, 10CM midline placed in the right brachial vein. Needle advanced into the vessel under real time ultrasound guidance. Image recorded and saved.    Max dwell date 02/06/24.  Lot number: [id number]

## 2024-01-08 NOTE — ED PROVIDER NOTES
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 Pt with complaint of nausea and decreased PO intake. Has for the most part a liquid diet because of and esophageal ulcer and just has not felt like eating. Per  pt was better when she was discharged on 12/24/23 but since then she has become increasingly more weak and seems to have decreased cognition- answers questions slower than usual and will occasionally ask "Where's Nelly?" But they dont know who Nelly is. Per  pt has done this in the past when her sodium was low or her ammonia was high.    also notes she has been so weak at home that she has fallen a few times striking her head. She is also confused and getting up in the middle of the night and he is concerned she is going to fall again  Pt notes she has had loose stools recently and some mild abdominal pain. Has not been taking her lactulose.  No fevers. No chest pain. 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    Arnold-Chiari malformation     Depression     Esophageal stricture     Essential tremor     Hypertension     Left bundle branch block     Liver fibrosis, transplanted liver 10/2/2018    Suggested on fibroscan 10/2/18    Migraine without aura     MVP (mitral " valve prolapse)     Non-rheumatic mitral regurgitation 10/1/2018    Non-rheumatic tricuspid valve insufficiency 10/1/2018    Osteoporosis     Recurrent urinary tract infection     Seizures     Shingles 2007    SIADH (syndrome of inappropriate ADH production)     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    Tricuspid valve prolapse     Urolithiasis     Von Gierke disease     s/p liver transplant     Past Surgical History:   Procedure Laterality Date    APPENDECTOMY  6/22/2007    APPLICATION OF WOUND VACUUM-ASSISTED CLOSURE DEVICE N/A 9/18/2020    Procedure: APPLICATION, WOUND VAC;  Surgeon: Zain Decker MD;  Location: [facility] OR [address] [place] [address];  Service: General;  Laterality: N/A;    COLONOSCOPY  5/13/2008    internal hemorrhoids    CRANIOTOMY      ESOPHAGOGASTRODUODENOSCOPY N/A 9/3/2020    Procedure: EGD (ESOPHAGOGASTRODUODENOSCOPY);  Surgeon: Tyrel Vergara MD;  Location: [facility];  Service: Endoscopy;  Laterality: N/A;    ESOPHAGOGASTRODUODENOSCOPY N/A 12/22/2023    Procedure: EGD (ESOPHAGOGASTRODUODENOSCOPY);  Surgeon: Jhony James MD;  Location: [facility] ([address] [place] [address]);  Service: Endoscopy;  Laterality: N/A;    EXPLORATORY LAPAROTOMY  2020    due to perforated stomach    LAMINECTOMY  3/2001    LIVER TRANSPLANT  9/23/2002    OSSICULAR RECONSTRUCTION  10/4/1995    RIGHT REPLACEMENT PROSTHESIS for cholesteatoma    THYMECTOMY  5/2/2007    TONSILLECTOMY, ADENOIDECTOMY  1/21/2004    TOTAL ABDOMINAL HYSTERECTOMY  3/31/1994     Family History   Problem Relation Age of Onset    Stroke Mother     Heart disease Mother     No Known Problems Father      Social History     Tobacco Use    Smoking status: Never    Smokeless tobacco: Never   Substance Use Topics    Alcohol use: No    Drug use: No     Review of Systems   Constitutional:  Positive for appetite change and fatigue. Negative for fever.   HENT:  Negative for sore throat.    Respiratory:  Negative for shortness of breath.    Cardiovascular:  Negative for chest pain  and leg swelling.   Gastrointestinal:  Positive for abdominal pain, diarrhea and nausea. Negative for vomiting.   Genitourinary:  Negative for dysuria.   Skin:  Positive for color change.   Neurological:  Positive for dizziness, weakness, light-headedness and headaches.       Physical Exam     Initial Vitals [01/08/24 1140]   BP Pulse Resp Temp SpO2   (!) 145/63 67 16 97.9 °F (36.6 °C) 96 %      MAP       --         Physical Exam    Nursing note and vitals reviewed.  Constitutional:   Thin chronically ill appearing F in NAD   HENT:   Head: Normocephalic and atraumatic.   Eyes: Pupils are equal, round, and reactive to light.   Neck: Neck supple.   Cardiovascular:  Normal rate, regular rhythm and intact distal pulses.           Pulmonary/Chest: Breath sounds normal. No respiratory distress. She has no wheezes. She has no rales.   Abdominal: Abdomen is soft. She exhibits no distension.   Mild diffuse ttp, no rebound or guarding   Musculoskeletal:         General: No edema.      Cervical back: Neck supple.     Neurological: She has normal strength. GCS score is 15. GCS eye subscore is 4. GCS verbal subscore is 5. GCS motor subscore is 6.   Alert, oriented to person, place, month and year   Skin: Skin is warm and dry.         ED Course   Procedures  Labs Reviewed   CBC W/ AUTO DIFFERENTIAL - Abnormal; Notable for the following components:       Result Value    WBC 3.12 (*)     RBC 3.15 (*)     Hemoglobin 10.1 (*)     Hematocrit 28.7 (*)     MCH 32.1 (*)     Platelets 58 (*)     Lymph # 0.7 (*)     Mono % 16.0 (*)     All other components within normal limits   COMPREHENSIVE METABOLIC PANEL - Abnormal; Notable for the following components:    Sodium 134 (*)     CO2 20 (*)     BUN 39 (*)     Albumin 2.7 (*)     Total Bilirubin 2.9 (*)     Alkaline Phosphatase 486 (*)     AST 71 (*)     ALT 47 (*)     eGFR 48.6 (*)     All other components within normal limits   URINALYSIS, REFLEX TO URINE CULTURE - Abnormal; Notable for  the following components:    Protein, UA Trace (*)     All other components within normal limits    Narrative:     Specimen Source->Urine   SODIUM, URINE, RANDOM - Abnormal; Notable for the following components:    Sodium, Urine 18 (*)     All other components within normal limits    Narrative:     Specimen Source->Urine   OSMOLALITY, SERUM - Abnormal; Notable for the following components:    Osmolality 302 (*)     All other components within normal limits   ISTAT PROCEDURE - Abnormal; Notable for the following components:    POC BUN 35 (*)     POC Sodium 135 (*)     POC TCO2 (MEASURED) 21 (*)     POC Hematocrit 26 (*)     All other components within normal limits   CULTURE, BLOOD   CULTURE, BLOOD   INFLUENZA A & B BY MOLECULAR   CREATININE, URINE, RANDOM    Narrative:     Specimen Source->Urine   MAGNESIUM   PHOSPHORUS   TROPONIN I   TSH   AMMONIA   TACROLIMUS LEVEL   SARS-COV-2 RNA AMPLIFICATION, QUAL   ISTAT CHEM8     EKG Readings: (Independently Interpreted)   Sinus rhythm rate 67, LBBB seen on prior, no acute ischemic changes     ECG Results              EKG 12-lead (Final result)  Result time 01/08/24 12:32:14      Final result by Interface, Lab In [facility] (01/08/24 12:32:14)                   Narrative:    Test Reason : R53.1,    Vent. Rate : 067 BPM     Atrial Rate : 067 BPM     P-R Int : 122 ms          QRS Dur : 142 ms      QT Int : 438 ms       P-R-T Axes : 052 -05 109 degrees     QTc Int : 462 ms    Normal sinus rhythm  Left bundle branch block  Abnormal ECG  When compared with ECG of 21-DEC-2023 06:53,  No significant change was found  Confirmed by John PORTILLO MD ([id number]) on 1/8/2024 12:32:02 PM    Referred By: [email or web address]   SELF           Confirmed By:John PORTILLO MD                                  Imaging Results              X-Ray Chest PA And Lateral (Final result)  Result time 01/08/24 21:29:49      Final result by Clark Morales MD (01/08/24 21:29:49)                   Impression:      No acute  "cardiopulmonary finding.    Postoperative changes and other findings discussed above.      Electronically signed by: Clark Morales MD  Date:    01/08/2024  Time:    21:29               Narrative:    EXAMINATION:  XR CHEST PA AND LATERAL    CLINICAL HISTORY:  Provided history is "pleural effusion, history of esophageal tear with concern for leak;  ".    TECHNIQUE:  Frontal and lateral views of the chest were performed.    COMPARISON:  12/21/2023.    FINDINGS:  Cardiomediastinal silhouette is not enlarged.  Postoperative changes of prior median sternotomy.  No focal consolidation.  No sizable pleural effusion.  Right pleural effusion has apparently improved when compared with prior CT dated 12/22/2023.  No pneumothorax.  No advanced pneumomediastinum or subcutaneous emphysema in the lower neck.                                       CT Head Without Contrast (Final result)  Result time 01/08/24 18:46:03      Final result by Samuel Grant MD (01/08/24 18:46:03)                   Impression:      1. Allowing for motion artifact, no convincing acute intracranial abnormalities noting sequela of chronic microvascular ischemic change, senescent change, and scattered parenchymal calcification.      Electronically signed by: Samuel Grant MD  Date:    01/08/2024  Time:    18:46               Narrative:    EXAMINATION:  CT HEAD WITHOUT CONTRAST    CLINICAL HISTORY:  Mental status change, unknown cause;    TECHNIQUE:  Low dose axial images were obtained through the head.  Coronal and sagittal reformations were also performed. Contrast was not administered.    COMPARISON:  12/21/2023    FINDINGS:  There is motion artifact.    There is generalized cerebral volume loss.  There is hypoattenuation in a periventricular fashion, likely sequela of chronic microvascular ischemic change.There are scattered prominent foci of calcification, particularly within the bilateral frontal lobes and basal ganglia, stable.  There is no " evidence of acute major vascular territory infarct, hemorrhage, or mass.  There is no hydrocephalus.  There are no abnormal extra-axial fluid collections.  The paranasal sinuses and mastoid air cells are clear, and there is no evidence of calvarial fracture.  The visualized soft tissues are unremarkable.                                    X-Rays:   Independently Interpreted Readings:   Head CT: No hemorrhage.     Medications   sodium chloride 0.9% flush 10 mL (10 mLs Intravenous Given 1/8/24 1800)     And   sodium chloride 0.9% flush 10 mL (has no administration in time range)   sodium chloride 0.9% flush 10 mL (10 mLs Intravenous Given 1/8/24 1800)     And   sodium chloride 0.9% flush 10 mL (has no administration in time range)   sodium chloride 0.9% flush 10 mL (has no administration in time range)   lactulose 20 gram/30 mL solution Soln 10 g (10 g Oral Given 1/8/24 2221)   cefTRIAXone (ROCEPHIN) 1 g in dextrose 5 % in water (D5W) 100 mL IVPB (MB+) (1 g Intravenous New Bag 1/8/24 2224)   cefTRIAXone (ROCEPHIN) 2 g in dextrose 5 % in water (D5W) 100 mL IVPB (MB+) (has no administration in time range)   furosemide tablet 20 mg (has no administration in time range)   levothyroxine tablet 75 mcg (has no administration in time range)   magnesium oxide tablet 400 mg (400 mg Oral Given 1/8/24 2221)   rifAXIMin tablet 550 mg (550 mg Oral Given 1/8/24 2221)   sucralfate 100 mg/mL suspension 1 g (has no administration in time range)   tacrolimus capsule 0.5 mg (0.5 mg Oral Given 1/8/24 2220)   venlafaxine 24 hr capsule 150 mg (has no administration in time range)   prochlorperazine injection Soln 5 mg (has no administration in time range)   sodium chloride 0.9% flush 1-10 mL (has no administration in time range)   melatonin tablet 6 mg (has no administration in time range)   ondansetron disintegrating tablet 8 mg (8 mg Oral Given 1/8/24 2222)   polyethylene glycol packet 17 g (has no administration in time range)    acetaminophen tablet 650 mg (has no administration in time range)   simethicone chewable tablet 80 mg (has no administration in time range)   aluminum-magnesium hydroxide-simethicone 200-200-20 mg/5 mL suspension 30 mL (has no administration in time range)   acetaminophen tablet 650 mg (has no administration in time range)   naloxone 0.4 mg/mL injection 0.02 mg (has no administration in time range)   enoxaparin injection 40 mg (has no administration in time range)   butalbital-acetaminophen-caffeine -40 mg per tablet 1 tablet (has no administration in time range)   morphine injection 4 mg (4 mg Intravenous Given 1/8/24 1631)   HYDROmorphone injection 0.5 mg (0.5 mg Intravenous Given 1/8/24 1729)   sodium chloride 0.9% bolus 500 mL 500 mL (0 mLs Intravenous Stopped 1/8/24 1945)   HYDROmorphone injection 0.5 mg (0.5 mg Intravenous Given 1/8/24 1950)     Medical Decision Making  53-year-old female with a complicated medical history including HTN, 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  Ddx:  Electrolyte abnormality hyponatremia, JACQUE, dehydration, possible early hepatic encephalopathy  Some abd pain but benign abd exam, no significant distension no suspicion SBP  Has fallen past 2 days and struck head- CT head to eval ICH, SDH, traumatic SAH  Bloodwork ordered from triage    7:30 PM  Sodium 134,bloodwork including lfts stable from baseline. Ammonia not elevated. CT head with acute intracranial abnlty.  Discussed results with pt and significant other will admit for hydration and furtehr work up    Amount and/or Complexity of Data Reviewed  Labs: ordered.  Radiology: ordered.  ECG/medicine tests: ordered and independent interpretation performed. Decision-making details documented in ED Course.    Risk  Prescription drug management.                                 Clinical Impression:  Final diagnoses:  [R53.1] Weakness  [E86.0] Dehydration (Primary)          ED Disposition Condition    Observation Stable                Lila Marte MD  01/08/24 [id number]

## 2024-01-08 NOTE — FIRST PROVIDER EVALUATION
Medical screening examination initiated.  I have conducted a focused provider triage encounter, findings are as follows:    Brief history of present illness:  not speaking well for 3d.  Bad headache.  Last time this happened due to hyponatremia.  H/o liver transplant.    There were no vitals filed for this visit.    Pertinent physical exam:  no slurred speech, but a bit slow    Brief workup plan:  labs, EKG.  No stroke code.    Preliminary workup initiated; this workup will be continued and followed by the physician or advanced practice provider that is assigned to the patient when roomed.

## 2024-01-09 LAB
ALBUMIN SERPL BCP-MCNC: 2.9 G/DL (ref 3.5–5.2)
ALP SERPL-CCNC: 495 U/L (ref 55–135)
ALT SERPL W/O P-5'-P-CCNC: 45 U/L (ref 10–44)
ANION GAP SERPL CALC-SCNC: 8 MMOL/L (ref 8–16)
AST SERPL-CCNC: 69 U/L (ref 10–40)
BILIRUB SERPL-MCNC: 3.6 MG/DL (ref 0.1–1)
BUN SERPL-MCNC: 28 MG/DL (ref 6–20)
CALCIUM SERPL-MCNC: 9.1 MG/DL (ref 8.7–10.5)
CHLORIDE SERPL-SCNC: 105 MMOL/L (ref 95–110)
CO2 SERPL-SCNC: 21 MMOL/L (ref 23–29)
CREAT SERPL-MCNC: 1.1 MG/DL (ref 0.5–1.4)
EST. GFR  (NO RACE VARIABLE): 59.3 ML/MIN/1.73 M^2
GLUCOSE SERPL-MCNC: 94 MG/DL (ref 70–110)
POTASSIUM SERPL-SCNC: 3.8 MMOL/L (ref 3.5–5.1)
PROT SERPL-MCNC: 7 G/DL (ref 6–8.4)
SODIUM SERPL-SCNC: 134 MMOL/L (ref 136–145)
TACROLIMUS BLD-MCNC: 4 NG/ML (ref 5–15)

## 2024-01-09 PROCEDURE — 96366 THER/PROPH/DIAG IV INF ADDON: CPT

## 2024-01-09 PROCEDURE — 25000003 PHARM REV CODE 250: Performed by: STUDENT IN AN ORGANIZED HEALTH CARE EDUCATION/TRAINING PROGRAM

## 2024-01-09 PROCEDURE — 80053 COMPREHEN METABOLIC PANEL: CPT | Performed by: STUDENT IN AN ORGANIZED HEALTH CARE EDUCATION/TRAINING PROGRAM

## 2024-01-09 PROCEDURE — 63600175 PHARM REV CODE 636 W HCPCS: Performed by: STUDENT IN AN ORGANIZED HEALTH CARE EDUCATION/TRAINING PROGRAM

## 2024-01-09 PROCEDURE — 25000003 PHARM REV CODE 250: Performed by: EMERGENCY MEDICINE

## 2024-01-09 PROCEDURE — A4216 STERILE WATER/SALINE, 10 ML: HCPCS | Performed by: EMERGENCY MEDICINE

## 2024-01-09 PROCEDURE — 96372 THER/PROPH/DIAG INJ SC/IM: CPT | Performed by: STUDENT IN AN ORGANIZED HEALTH CARE EDUCATION/TRAINING PROGRAM

## 2024-01-09 PROCEDURE — 96376 TX/PRO/DX INJ SAME DRUG ADON: CPT

## 2024-01-09 PROCEDURE — 94761 N-INVAS EAR/PLS OXIMETRY MLT: CPT

## 2024-01-09 PROCEDURE — 63600175 PHARM REV CODE 636 W HCPCS: Performed by: HOSPITALIST

## 2024-01-09 PROCEDURE — G0378 HOSPITAL OBSERVATION PER HR: HCPCS

## 2024-01-09 RX ORDER — PANTOPRAZOLE SODIUM 40 MG/1
40 TABLET, DELAYED RELEASE ORAL DAILY
Status: DISCONTINUED | OUTPATIENT
Start: 2024-01-09 | End: 2024-01-10 | Stop reason: HOSPADM

## 2024-01-09 RX ORDER — PROCHLORPERAZINE EDISYLATE 5 MG/ML
5 INJECTION INTRAMUSCULAR; INTRAVENOUS EVERY 8 HOURS
Status: DISCONTINUED | OUTPATIENT
Start: 2024-01-09 | End: 2024-01-10 | Stop reason: HOSPADM

## 2024-01-09 RX ORDER — HYDROMORPHONE HYDROCHLORIDE 1 MG/ML
0.5 INJECTION, SOLUTION INTRAMUSCULAR; INTRAVENOUS; SUBCUTANEOUS EVERY 6 HOURS PRN
Status: DISCONTINUED | OUTPATIENT
Start: 2024-01-09 | End: 2024-01-09

## 2024-01-09 RX ADMIN — CEFTRIAXONE 2 G: 2 INJECTION, POWDER, FOR SOLUTION INTRAMUSCULAR; INTRAVENOUS at 10:01

## 2024-01-09 RX ADMIN — SUCRALFATE 1 G: 1 SUSPENSION ORAL at 12:01

## 2024-01-09 RX ADMIN — Medication 10 ML: at 12:01

## 2024-01-09 RX ADMIN — PROCHLORPERAZINE EDISYLATE 5 MG: 5 INJECTION INTRAMUSCULAR; INTRAVENOUS at 07:01

## 2024-01-09 RX ADMIN — RIFAXIMIN 550 MG: 550 TABLET ORAL at 09:01

## 2024-01-09 RX ADMIN — LACTULOSE 10 G: 20 SOLUTION ORAL at 08:01

## 2024-01-09 RX ADMIN — TACROLIMUS 0.5 MG: 0.5 CAPSULE ORAL at 08:01

## 2024-01-09 RX ADMIN — PANTOPRAZOLE SODIUM 40 MG: 40 TABLET, DELAYED RELEASE ORAL at 06:01

## 2024-01-09 RX ADMIN — TACROLIMUS 0.5 MG: 0.5 CAPSULE ORAL at 05:01

## 2024-01-09 RX ADMIN — FUROSEMIDE 20 MG: 20 TABLET ORAL at 08:01

## 2024-01-09 RX ADMIN — PROCHLORPERAZINE EDISYLATE 5 MG: 5 INJECTION INTRAMUSCULAR; INTRAVENOUS at 12:01

## 2024-01-09 RX ADMIN — PROCHLORPERAZINE EDISYLATE 5 MG: 5 INJECTION INTRAMUSCULAR; INTRAVENOUS at 09:01

## 2024-01-09 RX ADMIN — Medication 10 ML: at 06:01

## 2024-01-09 RX ADMIN — Medication 10 ML: at 11:01

## 2024-01-09 RX ADMIN — ENOXAPARIN SODIUM 40 MG: 40 INJECTION SUBCUTANEOUS at 05:01

## 2024-01-09 RX ADMIN — ONDANSETRON 8 MG: 8 TABLET, ORALLY DISINTEGRATING ORAL at 06:01

## 2024-01-09 RX ADMIN — VENLAFAXINE HYDROCHLORIDE 150 MG: 75 CAPSULE, EXTENDED RELEASE ORAL at 08:01

## 2024-01-09 RX ADMIN — SUCRALFATE 1 G: 1 SUSPENSION ORAL at 05:01

## 2024-01-09 RX ADMIN — SUCRALFATE 1 G: 1 SUSPENSION ORAL at 11:01

## 2024-01-09 RX ADMIN — LEVOTHYROXINE SODIUM 75 MCG: 75 TABLET ORAL at 06:01

## 2024-01-09 RX ADMIN — BUTALBITAL, ACETAMINOPHEN, AND CAFFEINE 1 TABLET: 50; 325; 40 TABLET ORAL at 09:01

## 2024-01-09 RX ADMIN — SUCRALFATE 1 G: 1 SUSPENSION ORAL at 06:01

## 2024-01-09 RX ADMIN — Medication 400 MG: at 09:01

## 2024-01-09 RX ADMIN — BUTALBITAL, ACETAMINOPHEN, AND CAFFEINE 1 TABLET: 50; 325; 40 TABLET ORAL at 12:01

## 2024-01-09 RX ADMIN — Medication 400 MG: at 08:01

## 2024-01-09 RX ADMIN — HYDROMORPHONE HYDROCHLORIDE 0.5 MG: 1 INJECTION, SOLUTION INTRAMUSCULAR; INTRAVENOUS; SUBCUTANEOUS at 04:01

## 2024-01-09 RX ADMIN — RIFAXIMIN 550 MG: 550 TABLET ORAL at 08:01

## 2024-01-09 RX ADMIN — LACTULOSE 10 G: 20 SOLUTION ORAL at 10:01

## 2024-01-09 NOTE — ASSESSMENT & PLAN NOTE
S/p liver transplant, on Tacrolimus. Co-morbidities are present and inclusive of ascites, portal hypertension, malnutrition, and anemia/pancytopenia.  MELD-Na score calculated; MELD 3.0: 19 at 2023 10:41 AM  MELD-Na: 17 at 2023 10:41 AM  Calculated from:  Serum Creatinine: 1.3 mg/dL at 2023 10:41 AM  Serum Sodium: 135 mmol/L at 2023 10:41 AM  Total Bilirubin: 3.3 mg/dL at 2023 10:41 AM  Serum Albumin: 2.9 g/dL at 2023 10:41 AM  INR(ratio): 1.2 at 2023 10:41 AM  Age at listin years  Sex: Female at 2023 10:41 AM      Continue chronic meds. Will avoid any hepatotoxic meds, and monitor CBC/CMP/INR for synthetic function.   Start scheduled Lactulose in case mental status changes could be exacerbated by hepatic encephalopathy.

## 2024-01-09 NOTE — ASSESSMENT & PLAN NOTE
Renal function remains around recent baseline. Avoid nephrotoxic agents as able, and renally dose all meds as applicable.

## 2024-01-09 NOTE — ASSESSMENT & PLAN NOTE
Chronic, and at baseline. Likely due to liver disease and history of SIADH. Not likely contributing to mental status. Will monitor.

## 2024-01-09 NOTE — ED NOTES
Took report from An CLOUD, and assumed care of pt at this time. Pt resting comfortably and independently repositioned in stretcher with bed locked in lowest position for safety. NAD noted at this time. Respirations even and unlabored and visible chest rise noted.  Patient offered bathroom assistance and denies need at this time. Pt instructed to call if assistance is needed. Family at bedside. No needs at this time. Will continue to monitor.

## 2024-01-09 NOTE — HOSPITAL COURSE
Admitted for dysarthria and headache, head imaging no acute changes. Ammonia level normal, restarted lactulose. Given dilaudid and started having N/V, given her history of gastroparesis opiates likely aggravated. Started on scheduled antiemetics. N/V and headache improved. Encephalopathy resolved, recommend cont lactulose at discharge. Stable for discharge.

## 2024-01-09 NOTE — ASSESSMENT & PLAN NOTE
Presents with reported mental status and gait changes per her . Chart review shows history of this, and CT with no acute pathology but chronic microvascular ischemic changes. Suspect multifactorial etiology on chronic cognitive and gait impairments. Low suspicion for this, but given known liver disease with ascites, will cover for possible SBP with CTX. As she had recent strep mitis/oralis bacteremia (12/2023) and is immunocompromised, will check blood cultures. Start Lactulose scheduled for possible hepatic encephalopathy. Monitor with delirium precautions.

## 2024-01-09 NOTE — SUBJECTIVE & OBJECTIVE
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    Arnold-Chiari malformation     Depression     Esophageal stricture     Essential tremor     Hypertension     Left bundle branch block     Liver fibrosis, transplanted liver 10/2/2018    Suggested on fibroscan 10/2/18    Migraine without aura     MVP (mitral valve prolapse)     Non-rheumatic mitral regurgitation 10/1/2018    Non-rheumatic tricuspid valve insufficiency 10/1/2018    Osteoporosis     Recurrent urinary tract infection     Seizures     Shingles 2007    SIADH (syndrome of inappropriate ADH production)     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    Tricuspid valve prolapse     Urolithiasis     Von Gierke disease     s/p liver transplant       Past Surgical History:   Procedure Laterality Date    APPENDECTOMY  6/22/2007    APPLICATION OF WOUND VACUUM-ASSISTED CLOSURE DEVICE N/A 9/18/2020    Procedure: APPLICATION, WOUND VAC;  Surgeon: Zain Decker MD;  Location: [facility] OR [address] [place] [address];  Service: General;  Laterality: N/A;    COLONOSCOPY  5/13/2008    internal hemorrhoids    CRANIOTOMY      ESOPHAGOGASTRODUODENOSCOPY N/A 9/3/2020    Procedure: EGD (ESOPHAGOGASTRODUODENOSCOPY);  Surgeon: Tyrel Vergara MD;  Location: [facility];  Service: Endoscopy;  Laterality: N/A;    ESOPHAGOGASTRODUODENOSCOPY N/A 12/22/2023    Procedure: EGD (ESOPHAGOGASTRODUODENOSCOPY);  Surgeon: Jhony James MD;  Location: [address]);  Service: Endoscopy;  Laterality: N/A;    EXPLORATORY LAPAROTOMY  2020    due to perforated stomach    LAMINECTOMY  3/2001    LIVER TRANSPLANT  9/23/2002    OSSICULAR RECONSTRUCTION  10/4/1995    RIGHT REPLACEMENT PROSTHESIS for cholesteatoma    THYMECTOMY  5/2/2007    TONSILLECTOMY, ADENOIDECTOMY  1/21/2004    TOTAL ABDOMINAL HYSTERECTOMY  3/31/1994       Review of patient's allergies indicates:   Allergen Reactions    Codeine Itching     Other reaction(s): Itching    Lipitor [atorvastatin] Other (See Comments)      Other reaction(s): Muscle pain  Muscle cranmps    Morphine Itching     Other reaction(s): nausea and vomiting     Zoloft [sertraline] Other (See Comments)     Tremors/muscle spasms       Current Facility-Administered Medications on File Prior to Encounter   Medication    onabotulinumtoxina injection 200 Units     Current Outpatient Medications on File Prior to Encounter   Medication Sig    butalbitaL-acetaminophen  mg Tab TAKE 1 TABLET BY MOUTH TWICE DAILY AS NEEDED FOR HEADACHE    calcium carbonate (TUMS) 200 mg calcium (500 mg) chewable tablet Take 1 tablet (500 mg total) by mouth 2 (two) times daily as needed. (Patient taking differently: Take 1,500 mg by mouth daily as needed for Heartburn.)    calcium phosphate trib/vit D3 (CALTRATE GUMMY BITES ORAL) Take by mouth.    clonazePAM (KLONOPIN) 0.5 MG tablet Take 1 tablet (0.5 mg total) by mouth 2 (two) times daily.    DENAVIR 1 % cream RICHAR EXT AA Q 2 H FOR 4 DAYS    fluorometholone 0.1% (FML) 0.1 % DrpS Place 2 drops into the left eye 2 (two) times daily.    furosemide (LASIX) 20 MG tablet     hydrocortisone 2.5 % cream Apply topically to affected area twice daily as needed    lactulose (CHRONULAC) 10 gram/15 mL solution Take 15 mLs (10 g total) by mouth 3 (three) times daily. (Patient taking differently: Take 10 g by mouth 3 (three) times daily. As needed)    levothyroxine (SYNTHROID) 75 MCG tablet Take 1 tablet (75 mcg total) by mouth once daily.    magnesium oxide (MAG-OX) 400 mg (241.3 mg magnesium) tablet TAKE 1 TABLET(400 MG) BY MOUTH TWICE DAILY    multivitamin capsule Take 1 capsule by mouth once daily.    ondansetron (ZOFRAN-ODT) 4 MG TbDL Take 1 tablet (4 mg total) by mouth every 6 (six) hours as needed (emesis, nausea).    pantoprazole (PROTONIX) 20 MG tablet Take 1 tablet (20 mg total) by mouth 2 (two) times daily before meals.    polyethylene glycol (GLYCOLAX) 17 gram/dose powder MIX 17 GRAMS (1 capful) IN LIQUID AND DRINK BY MOUTH TWICE  DAILY (Patient taking differently: Take 17 g by mouth 2 (two) times daily. As needed)    pravastatin (PRAVACHOL) 20 MG tablet Take 1 tablet (20 mg total) by mouth once daily.    prochlorperazine (COMPAZINE) 10 MG tablet Take 1 tablet (10 mg total) by mouth every 6 (six) hours as needed (migraine or nausea).    ramipriL (ALTACE) 5 MG capsule Take 1 capsule (5 mg total) by mouth once daily.    rifAXIMin (XIFAXAN) 550 mg Tab Take 1 tablet (550 mg total) by mouth 2 (two) times daily.    SHINGRIX, PF, 50 mcg/0.5 mL injection     spironolactone (ALDACTONE) 50 MG tablet     sucralfate (CARAFATE) 100 mg/mL suspension Take 10 mLs (1 g total) by mouth every 6 (six) hours.    tacrolimus (PROGRAF) 0.5 MG Cap Take 1 capsule (0.5 mg total) by mouth every 12 (twelve) hours.    ubrogepant (UBRELVY) 50 mg tablet Take 1 tablet po at onset of migraine. May repeat in 2 hours if needed. Max 2 tablets per day.    valACYclovir (VALTREX) 1000 MG tablet TAKE 2 TABLETS(2000 MG) BY MOUTH TWICE DAILY FOR 2 DOSES (Patient taking differently: TAKE 2 TABLETS(2000 MG) BY MOUTH TWICE DAILY FOR 2 DOSES as needed)    venlafaxine (EFFEXOR-XR) 150 MG Cp24 Take 1 capsule (150 mg total) by mouth once daily.     Family History       Problem Relation (Age of Onset)    Heart disease Mother    No Known Problems Father    Stroke Mother          Tobacco Use    Smoking status: Never    Smokeless tobacco: Never   Substance and Sexual Activity    Alcohol use: No    Drug use: No    Sexual activity: Not on file     Review of Systems   All other systems reviewed and are negative.    Objective:     Vital Signs (Most Recent):  Temp: 97.9 °F (36.6 °C) (01/08/24 1925)  Pulse: 63 (01/08/24 1925)  Resp: 17 (01/08/24 1950)  BP: (!) 179/79 (01/08/24 1925)  SpO2: 100 % (01/08/24 1925) Vital Signs (24h Range):  Temp:  [97.4 °F (36.3 °C)-97.9 °F (36.6 °C)] 97.9 °F (36.6 °C)  Pulse:  [61-68] 63  Resp:  [16-18] 17  SpO2:  [95 %-100 %] 100 %  BP: (132-179)/(63-79) 179/79      Weight: 50.8 kg (112 lb)  Body mass index is 22.61 kg/m².     Physical Exam  Vitals and nursing note reviewed.   Constitutional:       General: She is not in acute distress.     Appearance: She is well-developed. She is ill-appearing (chronically). She is not diaphoretic.   HENT:      Head: Normocephalic and atraumatic.      Mouth/Throat:      Mouth: Mucous membranes are moist.   Eyes:      General: No scleral icterus.     Conjunctiva/sclera: Conjunctivae normal.   Neck:      Vascular: No JVD.   Cardiovascular:      Rate and Rhythm: Normal rate.   Pulmonary:      Effort: Pulmonary effort is normal. No respiratory distress.   Abdominal:      General: Bowel sounds are normal. There is no distension.      Tenderness: There is no abdominal tenderness. There is no guarding.   Musculoskeletal:      Right lower leg: No edema.      Left lower leg: No edema.   Skin:     Coloration: Skin is not jaundiced or pale.   Neurological:      Mental Status: She is alert and oriented to person, place, and time.      Motor: No abnormal muscle tone.      Comments: Oriented to person, place, day/year, situation. Follows commands appropriately. No focal deficits. No tremor.    Psychiatric:         Mood and Affect: Mood normal.         Behavior: Behavior normal.                Significant Labs: All pertinent labs within the past 24 hours have been reviewed.  CBC:   Recent Labs   Lab 01/08/24  1625 01/08/24  1635   WBC 3.12*  --    HGB 10.1*  --    HCT 28.7* 26*   PLT 58*  --      CMP:   Recent Labs   Lab 01/08/24  1625   *   K 4.3      CO2 20*   GLU 95   BUN 39*   CREATININE 1.3   CALCIUM 8.8   PROT 6.6   ALBUMIN 2.7*   BILITOT 2.9*   ALKPHOS 486*   AST 71*   ALT 47*   ANIONGAP 8       Significant Imaging: I have reviewed all pertinent imaging results/findings within the past 24 hours.

## 2024-01-09 NOTE — ED NOTES
Received report from PEDRO LUIS zheng. Assumed care at this time. Upon entering room, pt had vomited on floor. Pt cleaned. C/o headache

## 2024-01-09 NOTE — PLAN OF CARE
[facility] - Med Surg  Initial Discharge Assessment       Primary Care Provider: David Lorenzo MD    Admission Diagnosis: Dehydration [E86.0]  Weakness [R53.1]  Chest pain [R07.9]    Admission Date: 1/8/2024  Expected Discharge Date: 1/10/2024    Transition of Care Barriers: None    Payor: [employer] MANAGED MEDICARE / Plan: [employer] MEDICARE PPO / Product Type: Medicare Advantage /     Extended Emergency Contact Information  Primary Emergency Contact: Kody Hernandez  Address: [address] [place] [address] [place] [address] [place]  Work Phone: [phone]  Mobile Phone: [phone]  Relation: Spouse    Discharge Plan A: Home with family  Discharge Plan B: Home      [id number] - [place], [place] - [address] AT [address] [facility]  [address]  [address]  [place] [address]  Phone: [phone] Fax: [phone]      Initial Assessment (most recent)       Adult Discharge Assessment - 01/09/24 1539          Discharge Assessment    Assessment Type Discharge Planning Assessment     Confirmed/corrected address, phone number and insurance Yes     Confirmed Demographics Correct on Facesheet     Source of Information patient     Does patient/caregiver understand observation status Yes     Communicated MARILEE with patient/caregiver Yes     Reason For Admission Dehydration     Facility Arrived From: Home     Do you expect to return to your current living situation? Yes     Do you have help at home or someone to help you manage your care at home? Yes     Who are your caregiver(s) and their phone number(s)? Kody spouse     Prior to hospitilization cognitive status: Alert/Oriented     Current cognitive status: Alert/Oriented     Walking or Climbing Stairs Difficulty yes     Walking or Climbing Stairs ambulation difficulty, requires equipment     Mobility Management RW     Dressing/Bathing Difficulty no     Home Accessibility wheelchair accessible     Home Layout Able to live on 1st floor      Equipment Currently Used at Home walker, rolling     Readmission within 30 days? No     Patient currently being followed by outpatient case management? No     Do you currently have service(s) that help you manage your care at home? No     Do you take prescription medications? Yes     Do you have prescription coverage? Yes     Do you have any problems affording any of your prescribed medications? No     Is the patient taking medications as prescribed? yes     Who is going to help you get home at discharge? Spouse Kody     How do you get to doctors appointments? car, drives self     Are you on dialysis? No     Do you take coumadin? No     Discharge Plan A Home with family     Discharge Plan B Home     DME Needed Upon Discharge  none     Discharge Plan discussed with: Patient     Transition of Care Barriers None        Physical Activity    On average, how many days per week do you engage in moderate to strenuous exercise (like a brisk walk)? 1 day     On average, how many minutes do you engage in exercise at this level? 20 min        Financial Resource Strain    How hard is it for you to pay for the very basics like food, housing, medical care, and heating? Not very hard        Housing Stability    In the last 12 months, was there a time when you were not able to pay the mortgage or rent on time? No     In the last 12 months, how many places have you lived? 1     In the last 12 months, was there a time when you did not have a steady place to sleep or slept in a shelter (including now)? No        Transportation Needs    In the past 12 months, has lack of transportation kept you from medical appointments or from getting medications? No     In the past 12 months, has lack of transportation kept you from meetings, work, or from getting things needed for daily living? No        Food Insecurity    Within the past 12 months, you worried that your food would run out before you got the money to buy more. Never true      Within the past 12 months, the food you bought just didn't last and you didn't have money to get more. Never true        Stress    Do you feel stress - tense, restless, nervous, or anxious, or unable to sleep at night because your mind is troubled all the time - these days? Only a little        Social Connections    In a typical week, how many times do you talk on the phone with family, friends, or neighbors? More than three times a week     How often do you get together with friends or relatives? More than three times a week     How often do you attend Christian or Jainism services? 1 to 4 times per year     Do you belong to any clubs or organizations such as Christian groups, unions, fraternal or athletic groups, or school groups? No     How often do you attend meetings of the clubs or organizations you belong to? Never     Are you , , , , never , or living with a partner?         Alcohol Use    Q1: How often do you have a drink containing alcohol? Patient declined     Q2: How many drinks containing alcohol do you have on a typical day when you are drinking? Patient declined     Q3: How often do you have six or more drinks on one occasion? Patient declined        OTHER    Name(s) of People in Home Kody Spouse                      Discharge Plan A and Plan B have been determined by review of patient's clinical status, future medical and therapeutic needs, and coverage/benefits for post-acute care in coordination with multidisciplinary team members.

## 2024-01-09 NOTE — HPI
"Elda Hernandez is a 53 y.o. female with history of von Gierke disease s/p liver transplant, now with cirrhosis and ascites/portal hypertension, recent hospitalization for dysphagia/esophagitis with mucosal tear and strep bacteremia, CKD 3a, pancytopenia, chronic hyponatremia with SIADH, debility and malnutrition who presents today with her  for reported speech changes.     History provided by patient, as no family available at bedside. She reports that her  brought her to the hospital because she has been "speaking weird for 3 days." When asked to describe this, she can not clarify. She states that she has felt "confused" and has been "wobbly on her feet," leading to some falls. She otherwise complains only of headache (which is not new for her) for 3 days and asks for more dilaudid that the ED gave her for headache. She does not remember her last BM and reports compliance with home medications. She takes Rifaximin but no longer takes lactulose. Denies new fever, abdominal pain, or other significant symptoms.        "

## 2024-01-09 NOTE — NURSING
Received admit from er via stretcher 52 y/o wf with dx of encephalopathy. Aaox4. No distress noted. No complaints at present. Will continue monitor.

## 2024-01-09 NOTE — H&P
"[facility] - Emergency Dept  [facility] Medicine  History & Physical    Patient Name: Elda Hernandez  MRN: [id number]  Patient Class: OP- Observation  Admission Date: 1/8/2024  Attending Physician: Sangita Hayes MD   Primary Care Provider: David Lorenzo MD         Patient information was obtained from patient, past medical records, and ER records.     Subjective:     Principal Problem:Encephalopathy    Chief Complaint:   Chief Complaint   Patient presents with    Headache    Aphasia     X 3 days; last time it happened her sodium was low        HPI: Elda Hernandez is a 53 y.o. female with history of von Gierke disease s/p liver transplant, now with cirrhosis and ascites/portal hypertension, recent hospitalization for dysphagia/esophagitis with mucosal tear and strep bacteremia, CKD 3a, pancytopenia, chronic hyponatremia with SIADH, debility and malnutrition who presents today with her  for reported speech changes.     History provided by patient, as no family available at bedside. She reports that her  brought her to the hospital because she has been "speaking weird for 3 days." When asked to describe this, she can not clarify. She states that she has felt "confused" and has been "wobbly on her feet," leading to some falls. She otherwise complains only of headache (which is not new for her) for 3 days and asks for more dilaudid that the ED gave her for headache. She does not remember her last BM and reports compliance with home medications. She takes Rifaximin but no longer takes lactulose. Denies new fever, abdominal pain, or other significant symptoms.          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    Arnold-Chiari malformation     Depression     Esophageal stricture     Essential tremor     Hypertension     Left bundle branch block     Liver fibrosis, transplanted liver 10/2/2018    Suggested on fibroscan 10/2/18    Migraine without aura     MVP (mitral valve " prolapse)     Non-rheumatic mitral regurgitation 10/1/2018    Non-rheumatic tricuspid valve insufficiency 10/1/2018    Osteoporosis     Recurrent urinary tract infection     Seizures     Shingles 2007    SIADH (syndrome of inappropriate ADH production)     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    Tricuspid valve prolapse     Urolithiasis     Von Gierke disease     s/p liver transplant       Past Surgical History:   Procedure Laterality Date    APPENDECTOMY  6/22/2007    APPLICATION OF WOUND VACUUM-ASSISTED CLOSURE DEVICE N/A 9/18/2020    Procedure: APPLICATION, WOUND VAC;  Surgeon: Zain Decker MD;  Location: [facility] OR [address] [place] [address];  Service: General;  Laterality: N/A;    COLONOSCOPY  5/13/2008    internal hemorrhoids    CRANIOTOMY      ESOPHAGOGASTRODUODENOSCOPY N/A 9/3/2020    Procedure: EGD (ESOPHAGOGASTRODUODENOSCOPY);  Surgeon: Tyrel Vergara MD;  Location: [facility];  Service: Endoscopy;  Laterality: N/A;    ESOPHAGOGASTRODUODENOSCOPY N/A 12/22/2023    Procedure: EGD (ESOPHAGOGASTRODUODENOSCOPY);  Surgeon: Jhony James MD;  Location: [facility] ([address] [place] [address]);  Service: Endoscopy;  Laterality: N/A;    EXPLORATORY LAPAROTOMY  2020    due to perforated stomach    LAMINECTOMY  3/2001    LIVER TRANSPLANT  9/23/2002    OSSICULAR RECONSTRUCTION  10/4/1995    RIGHT REPLACEMENT PROSTHESIS for cholesteatoma    THYMECTOMY  5/2/2007    TONSILLECTOMY, ADENOIDECTOMY  1/21/2004    TOTAL ABDOMINAL HYSTERECTOMY  3/31/1994       Review of patient's allergies indicates:   Allergen Reactions    Codeine Itching     Other reaction(s): Itching    Lipitor [atorvastatin] Other (See Comments)     Other reaction(s): Muscle pain  Muscle cranmps    Morphine Itching     Other reaction(s): nausea and vomiting     Zoloft [sertraline] Other (See Comments)     Tremors/muscle spasms       Current Facility-Administered Medications on File Prior to Encounter   Medication    onabotulinumtoxina injection 200 Units     Current Outpatient  Medications on File Prior to Encounter   Medication Sig    butalbitaL-acetaminophen  mg Tab TAKE 1 TABLET BY MOUTH TWICE DAILY AS NEEDED FOR HEADACHE    calcium carbonate (TUMS) 200 mg calcium (500 mg) chewable tablet Take 1 tablet (500 mg total) by mouth 2 (two) times daily as needed. (Patient taking differently: Take 1,500 mg by mouth daily as needed for Heartburn.)    calcium phosphate trib/vit D3 (CALTRATE GUMMY BITES ORAL) Take by mouth.    clonazePAM (KLONOPIN) 0.5 MG tablet Take 1 tablet (0.5 mg total) by mouth 2 (two) times daily.    DENAVIR 1 % cream RICHAR EXT AA Q 2 H FOR 4 DAYS    fluorometholone 0.1% (FML) 0.1 % DrpS Place 2 drops into the left eye 2 (two) times daily.    furosemide (LASIX) 20 MG tablet     hydrocortisone 2.5 % cream Apply topically to affected area twice daily as needed    lactulose (CHRONULAC) 10 gram/15 mL solution Take 15 mLs (10 g total) by mouth 3 (three) times daily. (Patient taking differently: Take 10 g by mouth 3 (three) times daily. As needed)    levothyroxine (SYNTHROID) 75 MCG tablet Take 1 tablet (75 mcg total) by mouth once daily.    magnesium oxide (MAG-OX) 400 mg (241.3 mg magnesium) tablet TAKE 1 TABLET(400 MG) BY MOUTH TWICE DAILY    multivitamin capsule Take 1 capsule by mouth once daily.    ondansetron (ZOFRAN-ODT) 4 MG TbDL Take 1 tablet (4 mg total) by mouth every 6 (six) hours as needed (emesis, nausea).    pantoprazole (PROTONIX) 20 MG tablet Take 1 tablet (20 mg total) by mouth 2 (two) times daily before meals.    polyethylene glycol (GLYCOLAX) 17 gram/dose powder MIX 17 GRAMS (1 capful) IN LIQUID AND DRINK BY MOUTH TWICE DAILY (Patient taking differently: Take 17 g by mouth 2 (two) times daily. As needed)    pravastatin (PRAVACHOL) 20 MG tablet Take 1 tablet (20 mg total) by mouth once daily.    prochlorperazine (COMPAZINE) 10 MG tablet Take 1 tablet (10 mg total) by mouth every 6 (six) hours as needed (migraine or nausea).    ramipriL (ALTACE) 5 MG capsule  Take 1 capsule (5 mg total) by mouth once daily.    rifAXIMin (XIFAXAN) 550 mg Tab Take 1 tablet (550 mg total) by mouth 2 (two) times daily.    SHINGRIX, PF, 50 mcg/0.5 mL injection     spironolactone (ALDACTONE) 50 MG tablet     sucralfate (CARAFATE) 100 mg/mL suspension Take 10 mLs (1 g total) by mouth every 6 (six) hours.    tacrolimus (PROGRAF) 0.5 MG Cap Take 1 capsule (0.5 mg total) by mouth every 12 (twelve) hours.    ubrogepant (UBRELVY) 50 mg tablet Take 1 tablet po at onset of migraine. May repeat in 2 hours if needed. Max 2 tablets per day.    valACYclovir (VALTREX) 1000 MG tablet TAKE 2 TABLETS(2000 MG) BY MOUTH TWICE DAILY FOR 2 DOSES (Patient taking differently: TAKE 2 TABLETS(2000 MG) BY MOUTH TWICE DAILY FOR 2 DOSES as needed)    venlafaxine (EFFEXOR-XR) 150 MG Cp24 Take 1 capsule (150 mg total) by mouth once daily.     Family History       Problem Relation (Age of Onset)    Heart disease Mother    No Known Problems Father    Stroke Mother          Tobacco Use    Smoking status: Never    Smokeless tobacco: Never   Substance and Sexual Activity    Alcohol use: No    Drug use: No    Sexual activity: Not on file     Review of Systems   All other systems reviewed and are negative.    Objective:     Vital Signs (Most Recent):  Temp: 97.9 °F (36.6 °C) (01/08/24 1925)  Pulse: 63 (01/08/24 1925)  Resp: 17 (01/08/24 1950)  BP: (!) 179/79 (01/08/24 1925)  SpO2: 100 % (01/08/24 1925) Vital Signs (24h Range):  Temp:  [97.4 °F (36.3 °C)-97.9 °F (36.6 °C)] 97.9 °F (36.6 °C)  Pulse:  [61-68] 63  Resp:  [16-18] 17  SpO2:  [95 %-100 %] 100 %  BP: (132-179)/(63-79) 179/79     Weight: 50.8 kg (112 lb)  Body mass index is 22.61 kg/m².     Physical Exam  Vitals and nursing note reviewed.   Constitutional:       General: She is not in acute distress.     Appearance: She is well-developed. She is ill-appearing (chronically). She is not diaphoretic.   HENT:      Head: Normocephalic and atraumatic.      Mouth/Throat:       Mouth: Mucous membranes are moist.   Eyes:      General: No scleral icterus.     Conjunctiva/sclera: Conjunctivae normal.   Neck:      Vascular: No JVD.   Cardiovascular:      Rate and Rhythm: Normal rate.   Pulmonary:      Effort: Pulmonary effort is normal. No respiratory distress.   Abdominal:      General: Bowel sounds are normal. There is no distension.      Tenderness: There is no abdominal tenderness. There is no guarding.   Musculoskeletal:      Right lower leg: No edema.      Left lower leg: No edema.   Skin:     Coloration: Skin is not jaundiced or pale.   Neurological:      Mental Status: She is alert and oriented to person, place, and time.      Motor: No abnormal muscle tone.      Comments: Oriented to person, place, day/year, situation. Follows commands appropriately. No focal deficits. No tremor.    Psychiatric:         Mood and Affect: Mood normal.         Behavior: Behavior normal.                Significant Labs: All pertinent labs within the past 24 hours have been reviewed.  CBC:   Recent Labs   Lab 01/08/24  1625 01/08/24  1635   WBC 3.12*  --    HGB 10.1*  --    HCT 28.7* 26*   PLT 58*  --      CMP:   Recent Labs   Lab 01/08/24  1625   *   K 4.3      CO2 20*   GLU 95   BUN 39*   CREATININE 1.3   CALCIUM 8.8   PROT 6.6   ALBUMIN 2.7*   BILITOT 2.9*   ALKPHOS 486*   AST 71*   ALT 47*   ANIONGAP 8       Significant Imaging: I have reviewed all pertinent imaging results/findings within the past 24 hours.  Assessment/Plan:     * Encephalopathy  Presents with reported mental status and gait changes per her . Chart review shows history of this, and CT with no acute pathology but chronic microvascular ischemic changes. Suspect multifactorial etiology on chronic cognitive and gait impairments. Low suspicion for this, but given known liver disease with ascites, will cover for possible SBP with CTX. As she had recent strep mitis/oralis bacteremia (12/2023) and is immunocompromised, will  check blood cultures. Start Lactulose scheduled for possible hepatic encephalopathy. Monitor with delirium precautions.       Stage 3a chronic kidney disease  Renal function remains around recent baseline. Avoid nephrotoxic agents as able, and renally dose all meds as applicable.    Ulcer of esophagus without bleeding  Noted on recent EGD with mucosal tear. Continue PPI and carafate.       Pancytopenia  Chronic, and stable around baseline. Monitor.     Hyponatremia  Chronic, and at baseline. Likely due to liver disease and history of SIADH. Not likely contributing to mental status. Will monitor.       Cirrhosis of liver with ascites  S/p liver transplant, on Tacrolimus. Co-morbidities are present and inclusive of ascites, portal hypertension, malnutrition, and anemia/pancytopenia.  MELD-Na score calculated; MELD 3.0: 19 at 2023 10:41 AM  MELD-Na: 17 at 2023 10:41 AM  Calculated from:  Serum Creatinine: 1.3 mg/dL at 2023 10:41 AM  Serum Sodium: 135 mmol/L at 2023 10:41 AM  Total Bilirubin: 3.3 mg/dL at 2023 10:41 AM  Serum Albumin: 2.9 g/dL at 2023 10:41 AM  INR(ratio): 1.2 at 2023 10:41 AM  Age at listin years  Sex: Female at 2023 10:41 AM      Continue chronic meds. Will avoid any hepatotoxic meds, and monitor CBC/CMP/INR for synthetic function.   Start scheduled Lactulose in case mental status changes could be exacerbated by hepatic encephalopathy.     Esophagitis  Continue PPI and carafate.       Long-term use of immunosuppressant medication  Without obvious signs of opportunistic infection. Monitor. Continue home tacrolimus.       Hypothyroidism  Continue home Synthroid.       SIADH (syndrome of inappropriate ADH production)  Na remains around baseline. Avoid excess fluid administration.      S/P liver transplant 02 for von Gierke disease  Continue home tacrolimus.         VTE Risk Mitigation (From admission, onward)           Ordered     enoxaparin  injection 40 mg  Every 24 hours         01/08/24 2200     IP VTE HIGH RISK PATIENT  Once         01/08/24 2200     Place sequential compression device  Until discontinued         01/08/24 2200     Place sequential compression device  Until discontinued         01/08/24 1929                       On 01/08/2024, patient should be placed in hospital observation services under my care.             John Martínez MD  Department of Hospital Medicine  [facility] - Emergency Dept

## 2024-01-09 NOTE — SUBJECTIVE & OBJECTIVE
Interval History: Patient reports feeling confused. Started having N/V. Noted history of gastroparesis, opiates probably making worse, dcd opiates.   Reports headache, chronic, denies neck pain or fever.     Review of Systems   Constitutional:  Negative for fever.   Gastrointestinal:  Positive for abdominal pain, nausea and vomiting.   Musculoskeletal:  Negative for neck pain.   Neurological:  Positive for headaches.   Psychiatric/Behavioral:  Positive for confusion.      Objective:     Vital Signs (Most Recent):  Temp: 97.9 °F (36.6 °C) (01/09/24 1545)  Pulse: 71 (01/09/24 1545)  Resp: 18 (01/09/24 1545)  BP: (!) 152/70 (01/09/24 1545)  SpO2: 97 % (01/09/24 1545) Vital Signs (24h Range):  Temp:  [96.1 °F (35.6 °C)-98 °F (36.7 °C)] 97.9 °F (36.6 °C)  Pulse:  [63-85] 71  Resp:  [15-18] 18  SpO2:  [94 %-100 %] 97 %  BP: (152-194)/(70-81) 152/70     Weight: 48.9 kg (107 lb 12.8 oz)  Body mass index is 18.5 kg/m².    Intake/Output Summary (Last 24 hours) at 1/9/2024 1758  Last data filed at 1/9/2024 0044  Gross per 24 hour   Intake 110 ml   Output --   Net 110 ml         Physical Exam  Vitals and nursing note reviewed.   Constitutional:       General: She is not in acute distress.     Appearance: She is well-developed. She is ill-appearing (chronically). She is not diaphoretic.      Comments: Drowsy on exam   HENT:      Head: Normocephalic and atraumatic.      Mouth/Throat:      Mouth: Mucous membranes are moist.   Eyes:      General: No scleral icterus.     Conjunctiva/sclera: Conjunctivae normal.   Neck:      Vascular: No JVD.   Cardiovascular:      Rate and Rhythm: Normal rate.   Pulmonary:      Effort: Pulmonary effort is normal. No respiratory distress.   Abdominal:      General: Bowel sounds are normal. There is no distension.      Palpations: Abdomen is soft.      Tenderness: There is abdominal tenderness. There is no guarding.   Musculoskeletal:      Right lower leg: No edema.      Left lower leg: No edema.    Skin:     Coloration: Skin is not jaundiced or pale.   Neurological:      Mental Status: She is alert and oriented to person, place, and time.      Motor: No abnormal muscle tone.      Comments: Oriented to person, place, day/year, situation. Follows commands appropriately. No focal deficits. No tremor.    Psychiatric:         Mood and Affect: Mood normal.         Behavior: Behavior normal.             Significant Labs: All pertinent labs within the past 24 hours have been reviewed.    Significant Imaging: I have reviewed all pertinent imaging results/findings within the past 24 hours.

## 2024-01-10 VITALS
HEIGHT: 64 IN | HEART RATE: 69 BPM | OXYGEN SATURATION: 100 % | DIASTOLIC BLOOD PRESSURE: 70 MMHG | RESPIRATION RATE: 18 BRPM | WEIGHT: 107.81 LBS | BODY MASS INDEX: 18.4 KG/M2 | TEMPERATURE: 96 F | SYSTOLIC BLOOD PRESSURE: 160 MMHG

## 2024-01-10 LAB
ALBUMIN SERPL BCP-MCNC: 2.5 G/DL (ref 3.5–5.2)
ALP SERPL-CCNC: 455 U/L (ref 55–135)
ALT SERPL W/O P-5'-P-CCNC: 48 U/L (ref 10–44)
ANION GAP SERPL CALC-SCNC: 6 MMOL/L (ref 8–16)
AST SERPL-CCNC: 76 U/L (ref 10–40)
BILIRUB SERPL-MCNC: 2.8 MG/DL (ref 0.1–1)
BUN SERPL-MCNC: 24 MG/DL (ref 6–20)
CALCIUM SERPL-MCNC: 8.7 MG/DL (ref 8.7–10.5)
CHLORIDE SERPL-SCNC: 102 MMOL/L (ref 95–110)
CO2 SERPL-SCNC: 23 MMOL/L (ref 23–29)
CREAT SERPL-MCNC: 1.1 MG/DL (ref 0.5–1.4)
EST. GFR  (NO RACE VARIABLE): 59.3 ML/MIN/1.73 M^2
GLUCOSE SERPL-MCNC: 82 MG/DL (ref 70–110)
POTASSIUM SERPL-SCNC: 4.6 MMOL/L (ref 3.5–5.1)
PROT SERPL-MCNC: 6 G/DL (ref 6–8.4)
SODIUM SERPL-SCNC: 131 MMOL/L (ref 136–145)

## 2024-01-10 PROCEDURE — 80053 COMPREHEN METABOLIC PANEL: CPT | Performed by: STUDENT IN AN ORGANIZED HEALTH CARE EDUCATION/TRAINING PROGRAM

## 2024-01-10 PROCEDURE — A4216 STERILE WATER/SALINE, 10 ML: HCPCS | Performed by: EMERGENCY MEDICINE

## 2024-01-10 PROCEDURE — G0378 HOSPITAL OBSERVATION PER HR: HCPCS

## 2024-01-10 PROCEDURE — 96376 TX/PRO/DX INJ SAME DRUG ADON: CPT

## 2024-01-10 PROCEDURE — 63600175 PHARM REV CODE 636 W HCPCS: Performed by: STUDENT IN AN ORGANIZED HEALTH CARE EDUCATION/TRAINING PROGRAM

## 2024-01-10 PROCEDURE — 25000003 PHARM REV CODE 250: Performed by: EMERGENCY MEDICINE

## 2024-01-10 PROCEDURE — 25000003 PHARM REV CODE 250: Performed by: STUDENT IN AN ORGANIZED HEALTH CARE EDUCATION/TRAINING PROGRAM

## 2024-01-10 RX ORDER — LACTULOSE 10 G/15ML
10 SOLUTION ORAL; RECTAL 3 TIMES DAILY
Qty: 1892 ML | Refills: 6 | Status: SHIPPED | OUTPATIENT
Start: 2024-01-10

## 2024-01-10 RX ORDER — SUCRALFATE 1 G/10ML
1 SUSPENSION ORAL EVERY 6 HOURS
Qty: 1200 ML | Refills: 3 | Status: SHIPPED | OUTPATIENT
Start: 2024-01-10 | End: 2024-05-09

## 2024-01-10 RX ADMIN — PROCHLORPERAZINE EDISYLATE 5 MG: 5 INJECTION INTRAMUSCULAR; INTRAVENOUS at 05:01

## 2024-01-10 RX ADMIN — PANTOPRAZOLE SODIUM 40 MG: 40 TABLET, DELAYED RELEASE ORAL at 10:01

## 2024-01-10 RX ADMIN — Medication 10 ML: at 12:01

## 2024-01-10 RX ADMIN — SUCRALFATE 1 G: 1 SUSPENSION ORAL at 11:01

## 2024-01-10 RX ADMIN — TACROLIMUS 0.5 MG: 0.5 CAPSULE ORAL at 10:01

## 2024-01-10 RX ADMIN — Medication 10 ML: at 06:01

## 2024-01-10 RX ADMIN — FUROSEMIDE 20 MG: 20 TABLET ORAL at 10:01

## 2024-01-10 RX ADMIN — SUCRALFATE 1 G: 1 SUSPENSION ORAL at 05:01

## 2024-01-10 RX ADMIN — LEVOTHYROXINE SODIUM 75 MCG: 75 TABLET ORAL at 06:01

## 2024-01-10 RX ADMIN — Medication 400 MG: at 10:01

## 2024-01-10 RX ADMIN — VENLAFAXINE HYDROCHLORIDE 150 MG: 75 CAPSULE, EXTENDED RELEASE ORAL at 10:01

## 2024-01-10 NOTE — PLAN OF CARE
Patient is adequate for discharge. Orders acknowledge. IV removed. Vitals stable. No new complaints. AVS reviewed with patient. Discharge medications delivered at bedside.    Problem: Adult Inpatient Plan of Care  Goal: Plan of Care Review  Outcome: Met  Goal: Patient-Specific Goal (Individualized)  Outcome: Met  Goal: Absence of Hospital-Acquired Illness or Injury  Outcome: Met  Goal: Optimal Comfort and Wellbeing  Outcome: Met  Goal: Readiness for Transition of Care  Outcome: Met     Problem: Fluid and Electrolyte Imbalance (Acute Kidney Injury/Impairment)  Goal: Fluid and Electrolyte Balance  Outcome: Met     Problem: Oral Intake Inadequate (Acute Kidney Injury/Impairment)  Goal: Optimal Nutrition Intake  Outcome: Met     Problem: Renal Function Impairment (Acute Kidney Injury/Impairment)  Goal: Effective Renal Function  Outcome: Met     Problem: Infection  Goal: Absence of Infection Signs and Symptoms  Outcome: Met

## 2024-01-10 NOTE — ASSESSMENT & PLAN NOTE
- patient with new onset N/V since admission  - noted history of gastroparesis   - schedule antiemetics  - opiates likely contributing, dc opiates

## 2024-01-10 NOTE — PROGRESS NOTES
"[facility] Medicine  Progress Note    Patient Name: Elda Hernandez  MRN: [id number]  Patient Class: OP- Observation   Admission Date: 1/8/2024  Length of Stay: 0 days  Attending Physician: Sangita Hayes MD  Primary Care Provider: David Lorenzo MD        Subjective:     Principal Problem:Encephalopathy        HPI:  Elda Hernandez is a 53 y.o. female with history of von Gierke disease s/p liver transplant, now with cirrhosis and ascites/portal hypertension, recent hospitalization for dysphagia/esophagitis with mucosal tear and strep bacteremia, CKD 3a, pancytopenia, chronic hyponatremia with SIADH, debility and malnutrition who presents today with her  for reported speech changes.     History provided by patient, as no family available at bedside. She reports that her  brought her to the hospital because she has been "speaking weird for 3 days." When asked to describe this, she can not clarify. She states that she has felt "confused" and has been "wobbly on her feet," leading to some falls. She otherwise complains only of headache (which is not new for her) for 3 days and asks for more dilaudid that the ED gave her for headache. She does not remember her last BM and reports compliance with home medications. She takes Rifaximin but no longer takes lactulose. Denies new fever, abdominal pain, or other significant symptoms.          Overview/Hospital Course:  Admitted for dysarthria and headache, head imaging no acute changes. Ammonia level normal, restarted lactulose. Given dilaudid and started having N/V, given her history of gastroparesis opiates likely aggravated. Started on scheduled antiemetics.     Interval History: Patient reports feeling confused. Started having N/V. Noted history of gastroparesis, opiates probably making worse, dcd opiates.   Reports headache, chronic, denies neck pain or fever.     Review of Systems   Constitutional:  Negative for fever. "   Gastrointestinal:  Positive for abdominal pain, nausea and vomiting.   Musculoskeletal:  Negative for neck pain.   Neurological:  Positive for headaches.   Psychiatric/Behavioral:  Positive for confusion.      Objective:     Vital Signs (Most Recent):  Temp: 97.9 °F (36.6 °C) (01/09/24 1545)  Pulse: 71 (01/09/24 1545)  Resp: 18 (01/09/24 1545)  BP: (!) 152/70 (01/09/24 1545)  SpO2: 97 % (01/09/24 1545) Vital Signs (24h Range):  Temp:  [96.1 °F (35.6 °C)-98 °F (36.7 °C)] 97.9 °F (36.6 °C)  Pulse:  [63-85] 71  Resp:  [15-18] 18  SpO2:  [94 %-100 %] 97 %  BP: (152-194)/(70-81) 152/70     Weight: 48.9 kg (107 lb 12.8 oz)  Body mass index is 18.5 kg/m².    Intake/Output Summary (Last 24 hours) at 1/9/2024 1758  Last data filed at 1/9/2024 0044  Gross per 24 hour   Intake 110 ml   Output --   Net 110 ml         Physical Exam  Vitals and nursing note reviewed.   Constitutional:       General: She is not in acute distress.     Appearance: She is well-developed. She is ill-appearing (chronically). She is not diaphoretic.      Comments: Drowsy on exam   HENT:      Head: Normocephalic and atraumatic.      Mouth/Throat:      Mouth: Mucous membranes are moist.   Eyes:      General: No scleral icterus.     Conjunctiva/sclera: Conjunctivae normal.   Neck:      Vascular: No JVD.   Cardiovascular:      Rate and Rhythm: Normal rate.   Pulmonary:      Effort: Pulmonary effort is normal. No respiratory distress.   Abdominal:      General: Bowel sounds are normal. There is no distension.      Palpations: Abdomen is soft.      Tenderness: There is abdominal tenderness. There is no guarding.   Musculoskeletal:      Right lower leg: No edema.      Left lower leg: No edema.   Skin:     Coloration: Skin is not jaundiced or pale.   Neurological:      Mental Status: She is alert and oriented to person, place, and time.      Motor: No abnormal muscle tone.      Comments: Oriented to person, place, day/year, situation. Follows commands  appropriately. No focal deficits. No tremor.    Psychiatric:         Mood and Affect: Mood normal.         Behavior: Behavior normal.             Significant Labs: All pertinent labs within the past 24 hours have been reviewed.    Significant Imaging: I have reviewed all pertinent imaging results/findings within the past 24 hours.    Assessment/Plan:      * Encephalopathy  Presents with reported mental status and gait changes per her .   - Chart review shows history of this, and CT with no acute pathology but chronic microvascular ischemic changes.   - Suspect multifactorial etiology on chronic cognitive and gait impairments.   - Low suspicion for this, but given known liver disease with ascites, will cover for possible SBP with CTX. Abd u/s ordered   - As she had recent strep mitis/oralis bacteremia (12/2023) and is immunocompromised, will check blood cultures.   - Start Lactulose scheduled for possible hepatic encephalopathy.   - Monitor with delirium precautions.       Ulcer of esophagus without bleeding  Noted on recent EGD with mucosal tear. Continue PPI and carafate.       Stage 3a chronic kidney disease  Renal function remains around recent baseline. Avoid nephrotoxic agents as able, and renally dose all meds as applicable.    Pancytopenia  Chronic, and stable around baseline. Monitor.     Hyponatremia  Chronic, and at baseline. Likely due to liver disease and history of SIADH. Not likely contributing to mental status. Will monitor.       Cirrhosis of liver with ascites  S/p liver transplant, on Tacrolimus. Co-morbidities are present and inclusive of ascites, portal hypertension, malnutrition, and anemia/pancytopenia.  MELD-Na score calculated; MELD 3.0: 19 at 12/30/2023 10:41 AM  MELD-Na: 17 at 12/30/2023 10:41 AM  Calculated from:  Serum Creatinine: 1.3 mg/dL at 12/30/2023 10:41 AM  Serum Sodium: 135 mmol/L at 12/30/2023 10:41 AM  Total Bilirubin: 3.3 mg/dL at 12/30/2023 10:41 AM  Serum Albumin: 2.9  g/dL at 2023 10:41 AM  INR(ratio): 1.2 at 2023 10:41 AM  Age at listin years  Sex: Female at 2023 10:41 AM      Continue chronic meds. Will avoid any hepatotoxic meds, and monitor CBC/CMP/INR for synthetic function.   Start scheduled Lactulose in case mental status changes could be exacerbated by hepatic encephalopathy.     - abd u/s ordered    Gastroparesis  - patient with new onset N/V since admission  - noted history of gastroparesis   - schedule antiemetics  - opiates likely contributing, dc opiates      Esophagitis  Continue PPI and carafate.       Long-term use of immunosuppressant medication  Without obvious signs of opportunistic infection. Monitor. Continue home tacrolimus.       Hypothyroidism  Continue home Synthroid.       SIADH (syndrome of inappropriate ADH production)  Na remains around baseline. Avoid excess fluid administration.      S/P liver transplant 02 for von Gierke disease  Continue home tacrolimus.         VTE Risk Mitigation (From admission, onward)           Ordered     enoxaparin injection 40 mg  Every 24 hours         24 2200     IP VTE HIGH RISK PATIENT  Once         24 2200     Place sequential compression device  Until discontinued         24                    Discharge Planning   MARILEE: 1/10/2024     Code Status: Full Code   Is the patient medically ready for discharge?: No    Reason for patient still in hospital (select all that apply): Patient trending condition  Discharge Plan A: Home with family                  Sangita Hayes MD  Department of Hospital Medicine   [facility] - Med Surg

## 2024-01-10 NOTE — PLAN OF CARE
APPOINTMENT:    Patient Appointment(s) scheduled with Dominga Sylvester PA-C Friday Jan 19, 2024 1:45 PM

## 2024-01-10 NOTE — ASSESSMENT & PLAN NOTE
S/p liver transplant, on Tacrolimus. Co-morbidities are present and inclusive of ascites, portal hypertension, malnutrition, and anemia/pancytopenia.  MELD-Na score calculated; MELD 3.0: 19 at 2023 10:41 AM  MELD-Na: 17 at 2023 10:41 AM  Calculated from:  Serum Creatinine: 1.3 mg/dL at 2023 10:41 AM  Serum Sodium: 135 mmol/L at 2023 10:41 AM  Total Bilirubin: 3.3 mg/dL at 2023 10:41 AM  Serum Albumin: 2.9 g/dL at 2023 10:41 AM  INR(ratio): 1.2 at 2023 10:41 AM  Age at listin years  Sex: Female at 2023 10:41 AM      Continue chronic meds. Will avoid any hepatotoxic meds, and monitor CBC/CMP/INR for synthetic function.   Start scheduled Lactulose in case mental status changes could be exacerbated by hepatic encephalopathy.     - abd u/s ordered

## 2024-01-10 NOTE — ASSESSMENT & PLAN NOTE
Presents with reported mental status and gait changes per her .   - Chart review shows history of this, and CT with no acute pathology but chronic microvascular ischemic changes.   - Suspect multifactorial etiology on chronic cognitive and gait impairments.   - Low suspicion for this, but given known liver disease with ascites, will cover for possible SBP with CTX. Abd u/s ordered   - As she had recent strep mitis/oralis bacteremia (12/2023) and is immunocompromised, will check blood cultures.   - Start Lactulose scheduled for possible hepatic encephalopathy.   - Monitor with delirium precautions.

## 2024-01-10 NOTE — PLAN OF CARE
Problem: Adult Inpatient Plan of Care  Goal: Plan of Care Review  Outcome: Ongoing, Progressing  Goal: Optimal Comfort and Wellbeing  Outcome: Ongoing, Progressing  Intervention: Monitor Pain and Promote Comfort  Flowsheets (Taken 1/10/2024 1514)  Pain Management Interventions:   medication offered   quiet environment facilitated   pillow support provided   care clustered

## 2024-01-10 NOTE — PLAN OF CARE
[facility] - Med Surg  Discharge Final Note    Primary Care Provider: David Lorenzo MD    Expected Discharge Date: 1/10/2024    Final Discharge Note (most recent)       Final Note - 01/10/24 0945          Final Note    Assessment Type Final Discharge Note     Anticipated Discharge Disposition Home or Self Care     Hospital Resources/Appts/Education Provided Appointments scheduled and added to Located within [facility]     Hospital Follow Up  Appt(s) scheduled? Yes     Discharge plans and expectations educations in teach back method with documentation complete? Yes        Post-Acute Status    Post-Acute Authorization Other     Other Status No Post-Acute Service Needs     Discharge Delays None known at this time                     Important Message from Medicare

## 2024-01-11 NOTE — ASSESSMENT & PLAN NOTE
Presents with reported mental status and gait changes per her .   - Chart review shows history of this, and CT with no acute pathology but chronic microvascular ischemic changes.   - Suspect multifactorial etiology on chronic cognitive and gait impairments.   - Low suspicion for this, but given known liver disease with ascites, will cover for possible SBP with CTX. Abd u/s ordered - neg for ascites  and no abd pain antibiotics stopped   - As she had recent strep mitis/oralis bacteremia (12/2023) and is immunocompromised, will check blood cultures- NGTD  - Start Lactulose scheduled for possible hepatic encephalopathy.   - Monitor with delirium precautions.

## 2024-01-11 NOTE — DISCHARGE SUMMARY
"[facility] Medicine  Discharge Summary      Patient Name: Elda Hernandez  MRN: [id number]  LUCY: [id number]  Patient Class: OP- Observation  Admission Date: 1/8/2024  Hospital Length of Stay: 0 days  Discharge Date and Time: 1/10/2024  3:49 PM  Attending Physician: Nakia att. providers found   Discharging Provider: Sangita Hayes MD  Primary Care Provider: David Lorenzo MD  [facility] Medicine Team: [facility] Sangita Hayes MD  Primary Care Team: [facility]    HPI:   Elda Hernandez is a 53 y.o. female with history of von Gierke disease s/p liver transplant, now with cirrhosis and ascites/portal hypertension, recent hospitalization for dysphagia/esophagitis with mucosal tear and strep bacteremia, CKD 3a, pancytopenia, chronic hyponatremia with SIADH, debility and malnutrition who presents today with her  for reported speech changes.     History provided by patient, as no family available at bedside. She reports that her  brought her to the hospital because she has been "speaking weird for 3 days." When asked to describe this, she can not clarify. She states that she has felt "confused" and has been "wobbly on her feet," leading to some falls. She otherwise complains only of headache (which is not new for her) for 3 days and asks for more dilaudid that the ED gave her for headache. She does not remember her last BM and reports compliance with home medications. She takes Rifaximin but no longer takes lactulose. Denies new fever, abdominal pain, or other significant symptoms.          * No surgery found *      Hospital Course:   Admitted for dysarthria and headache, head imaging no acute changes. Ammonia level normal, restarted lactulose. Given dilaudid and started having N/V, given her history of gastroparesis opiates likely aggravated. Started on scheduled antiemetics. N/V and headache improved. Encephalopathy resolved, recommend cont lactulose at discharge. Stable for " discharge.      Goals of Care Treatment Preferences:  Code Status: Full Code      Consults:   Consults (From admission, onward)          Status Ordering Provider     Inpatient consult to Midline team  Once        Provider:  (Not yet assigned)    Completed DONNY LONG            Neuro  * Encephalopathy  Presents with reported mental status and gait changes per her .   - Chart review shows history of this, and CT with no acute pathology but chronic microvascular ischemic changes.   - Suspect multifactorial etiology on chronic cognitive and gait impairments.   - Low suspicion for this, but given known liver disease with ascites, will cover for possible SBP with CTX. Abd u/s ordered - neg for ascites  and no abd pain antibiotics stopped   - As she had recent strep mitis/oralis bacteremia (12/2023) and is immunocompromised, will check blood cultures- NGTD  - Start Lactulose scheduled for possible hepatic encephalopathy.   - Monitor with delirium precautions.       Renal/  Stage 3a chronic kidney disease  Renal function remains around recent baseline. Avoid nephrotoxic agents as able, and renally dose all meds as applicable.    Hyponatremia  Chronic, and at baseline. Likely due to liver disease and history of SIADH. Not likely contributing to mental status. Will monitor.       Oncology  Pancytopenia  Chronic, and stable around baseline. Monitor.     Endocrine  Hypothyroidism  Continue home Synthroid.       SIADH (syndrome of inappropriate ADH production)  Na remains around baseline. Avoid excess fluid administration.      GI  Ulcer of esophagus without bleeding  Noted on recent EGD with mucosal tear. Continue PPI and carafate.       Cirrhosis of liver with ascites  S/p liver transplant, on Tacrolimus. Co-morbidities are present and inclusive of ascites, portal hypertension, malnutrition, and anemia/pancytopenia.  MELD-Na score calculated; MELD 3.0: 19 at 12/30/2023 10:41 AM  MELD-Na: 17 at 12/30/2023 10:41  AM  Calculated from:  Serum Creatinine: 1.3 mg/dL at 2023 10:41 AM  Serum Sodium: 135 mmol/L at 2023 10:41 AM  Total Bilirubin: 3.3 mg/dL at 2023 10:41 AM  Serum Albumin: 2.9 g/dL at 2023 10:41 AM  INR(ratio): 1.2 at 2023 10:41 AM  Age at listin years  Sex: Female at 2023 10:41 AM      Continue chronic meds. Will avoid any hepatotoxic meds, and monitor CBC/CMP/INR for synthetic function.   Start scheduled Lactulose in case mental status changes could be exacerbated by hepatic encephalopathy.     - abd u/s without ascites   - cont lactulose at discharge    Gastroparesis  - patient with new onset N/V since admission  - noted history of gastroparesis   - schedule antiemetics  - opiates likely contributing, dc opiates      Esophagitis  Continue PPI and carafate.       S/P liver transplant 02 for von Gierke disease  Continue home tacrolimus.       Palliative Care  Long-term use of immunosuppressant medication  Without obvious signs of opportunistic infection. Monitor. Continue home tacrolimus.         Final Active Diagnoses:    Diagnosis Date Noted POA    PRINCIPAL PROBLEM:  Encephalopathy [G93.40] 2023 Yes    Ulcer of esophagus without bleeding [K22.10] 2023 Yes     Chronic    Stage 3a chronic kidney disease [N18.31] 2023 Yes     Chronic    Pancytopenia [D61.818] 2023 Yes     Chronic    Hyponatremia [E87.1] 2023 Yes     Chronic    Cirrhosis of liver with ascites [K74.60, R18.8] 2022 Yes     Chronic    Gastroparesis [K31.84] 10/15/2020 Yes    Esophagitis [K20.90] 2020 Yes     Chronic    Long-term use of immunosuppressant medication [Z79.60]  Not Applicable     Chronic    SIADH (syndrome of inappropriate ADH production) [E22.2] 10/12/2013 Yes     Chronic    Hypothyroidism [E03.9] 10/12/2013 Yes     Chronic    S/P liver transplant 02 for von Gierke disease [Z94.4] 10/12/2013 Not Applicable     Chronic      Problems Resolved During  this Admission:       Discharged Condition: stable    Disposition: Home or Self Care    Follow Up:   Follow-up Information       David Lorenzo MD Follow up.    Specialties: Internal Medicine, Family Medicine  Contact information:  [address]  [facility] [address]  [phone]               David Lorenzo MD. Schedule an appointment as soon as possible for a visit in 1 week(s).    Specialties: Internal Medicine, Family Medicine  Contact information:  [address] [facility] [address]  [phone]                           Patient Instructions:      Ambulatory referral/consult to Hepatology   Standing Status: Future   Referral Priority: Urgent Referral Type: Consultation   Referral Reason: Specialty Services Required   Requested Specialty: Hepatology   Number of Visits Requested: 1     Diet full liquid     Notify your health care provider if you experience any of the following:  temperature >100.4     Notify your health care provider if you experience any of the following:  persistent nausea and vomiting or diarrhea     Notify your health care provider if you experience any of the following:  increased confusion or weakness     Activity as tolerated       Significant Diagnostic Studies: Labs: All labs within the past 24 hours have been reviewed    Pending Diagnostic Studies:       None           Medications:  Reconciled Home Medications:      Medication List      Some of the medications listed here do not show instructions, such as how often to take the medication. Ask your doctor or nurse how to use these medications.  Specifically ask about these and similar medications:   spironolactone (ALDACTONE) 50 MG tablet  furosemide (LASIX) 20 MG tablet       CHANGE how you take these medications      CONSTULOSE 10 gram/15 mL solution  Generic drug: lactulose  Take 15 mLs (10 g total) by mouth 3 (three) times daily.  What changed: additional instructions     polyethylene glycol 17 gram/dose powder  Commonly  known as: GLYCOLAX  MIX 17 GRAMS (1 capful) IN LIQUID AND DRINK BY MOUTH TWICE DAILY  What changed: additional instructions     valACYclovir 1000 MG tablet  Commonly known as: VALTREX  TAKE 2 TABLETS(2000 MG) BY MOUTH TWICE DAILY FOR 2 DOSES  What changed: additional instructions            CONTINUE taking these medications      butalbitaL-acetaminophen  mg Tab  TAKE 1 TABLET BY MOUTH TWICE DAILY AS NEEDED FOR HEADACHE     calcium carbonate 200 mg calcium (500 mg) chewable tablet  Commonly known as: TUMS  Take 1 tablet (500 mg total) by mouth 2 (two) times daily as needed.     CALTRATE GUMMY BITES ORAL  Take by mouth.     clonazePAM 0.5 MG tablet  Commonly known as: KlonoPIN  Take 1 tablet (0.5 mg total) by mouth 2 (two) times daily.     DENAVIR 1 % cream  Generic drug: penciclovir  RICHAR EXT AA Q 2 H FOR 4 DAYS     fluorometholone 0.1% 0.1 % Drps  Commonly known as: FML  Place 2 drops into the left eye 2 (two) times daily.     furosemide 20 MG tablet  Commonly known as: LASIX  __________________________     hydrocortisone 2.5 % cream  Apply topically to affected area twice daily as needed     levothyroxine 75 MCG tablet  Commonly known as: SYNTHROID  Take 1 tablet (75 mcg total) by mouth once daily.     magnesium oxide 400 mg (241.3 mg magnesium) tablet  Commonly known as: MAG-OX  TAKE 1 TABLET(400 MG) BY MOUTH TWICE DAILY     multivitamin capsule  Take 1 capsule by mouth once daily.     ondansetron 4 MG Tbdl  Commonly known as: ZOFRAN-ODT  Take 1 tablet (4 mg total) by mouth every 6 (six) hours as needed (emesis, nausea).     pantoprazole 20 MG tablet  Commonly known as: PROTONIX  Take 1 tablet (20 mg total) by mouth 2 (two) times daily before meals.     pravastatin 20 MG tablet  Commonly known as: PRAVACHOL  Take 1 tablet (20 mg total) by mouth once daily.     prochlorperazine 10 MG tablet  Commonly known as: COMPAZINE  Take 1 tablet (10 mg total) by mouth every 6 (six) hours as needed (migraine or nausea).      ramipriL 5 MG capsule  Commonly known as: ALTACE  Take 1 capsule (5 mg total) by mouth once daily.     rifAXIMin 550 mg Tab  Commonly known as: XIFAXAN  Take 1 tablet (550 mg total) by mouth 2 (two) times daily.     SHINGRIX (PF) 50 mcg/0.5 mL injection  Generic drug: varicella-zoster gE-AS01B (PF)     spironolactone 50 MG tablet  Commonly known as: ALDACTONE  __________________________     sucralfate 100 mg/mL suspension  Commonly known as: CARAFATE  Take 10 mLs (1 g total) by mouth every 6 (six) hours.     tacrolimus 0.5 MG Cap  Commonly known as: PROGRAF  Take 1 capsule (0.5 mg total) by mouth every 12 (twelve) hours.     UBRELVY 50 mg tablet  Generic drug: ubrogepant  Take 1 tablet po at onset of migraine. May repeat in 2 hours if needed. Max 2 tablets per day.     venlafaxine 150 MG Cp24  Commonly known as: EFFEXOR-XR  Take 1 capsule (150 mg total) by mouth once daily.              Indwelling Lines/Drains at time of discharge:   Lines/Drains/Airways       None                   Time spent on the discharge of patient: 40 minutes         Sangita Hayes MD  Department of Hospital Medicine  [facility]

## 2024-01-11 NOTE — ASSESSMENT & PLAN NOTE
S/p liver transplant, on Tacrolimus. Co-morbidities are present and inclusive of ascites, portal hypertension, malnutrition, and anemia/pancytopenia.  MELD-Na score calculated; MELD 3.0: 19 at 2023 10:41 AM  MELD-Na: 17 at 2023 10:41 AM  Calculated from:  Serum Creatinine: 1.3 mg/dL at 2023 10:41 AM  Serum Sodium: 135 mmol/L at 2023 10:41 AM  Total Bilirubin: 3.3 mg/dL at 2023 10:41 AM  Serum Albumin: 2.9 g/dL at 2023 10:41 AM  INR(ratio): 1.2 at 2023 10:41 AM  Age at listin years  Sex: Female at 2023 10:41 AM      Continue chronic meds. Will avoid any hepatotoxic meds, and monitor CBC/CMP/INR for synthetic function.   Start scheduled Lactulose in case mental status changes could be exacerbated by hepatic encephalopathy.     - abd u/s without ascites   - cont lactulose at discharge

## 2024-01-13 LAB
BACTERIA BLD CULT: NORMAL
BACTERIA BLD CULT: NORMAL

## 2024-01-17 NOTE — TELEPHONE ENCOUNTER
No care due was identified.  Health [facility] Embedded Care Due Messages. Reference number: [id number].   1/17/2024 6:45:43 AM CST

## 2024-01-18 RX ORDER — BUTALBITAL AND ACETAMINOPHEN 325; 50 MG/1; MG/1
TABLET ORAL
Qty: 60 TABLET | Refills: 0 | Status: SHIPPED | OUTPATIENT
Start: 2024-01-18 | End: 2024-02-19

## 2024-01-25 ENCOUNTER — LAB VISIT (OUTPATIENT)
Dept: LAB | Facility: HOSPITAL | Age: 54
End: 2024-01-25
Attending: INTERNAL MEDICINE
Payer: MEDICARE

## 2024-01-25 ENCOUNTER — PATIENT MESSAGE (OUTPATIENT)
Dept: FAMILY MEDICINE | Facility: CLINIC | Age: 54
End: 2024-01-25

## 2024-01-25 ENCOUNTER — OFFICE VISIT (OUTPATIENT)
Dept: FAMILY MEDICINE | Facility: CLINIC | Age: 54
End: 2024-01-25
Payer: MEDICARE

## 2024-01-25 VITALS
SYSTOLIC BLOOD PRESSURE: 114 MMHG | HEIGHT: 64 IN | WEIGHT: 115.75 LBS | OXYGEN SATURATION: 99 % | BODY MASS INDEX: 19.76 KG/M2 | DIASTOLIC BLOOD PRESSURE: 62 MMHG | HEART RATE: 61 BPM

## 2024-01-25 DIAGNOSIS — G62.0 DRUG-INDUCED PERIPHERAL NEUROPATHY: ICD-10-CM

## 2024-01-25 DIAGNOSIS — E83.42 HYPOMAGNESEMIA: ICD-10-CM

## 2024-01-25 DIAGNOSIS — C22.0 HEPATOCELLULAR CARCINOMA: ICD-10-CM

## 2024-01-25 DIAGNOSIS — E03.4 HYPOTHYROIDISM DUE TO ACQUIRED ATROPHY OF THYROID: Chronic | ICD-10-CM

## 2024-01-25 DIAGNOSIS — Z94.4 LIVER REPLACED BY TRANSPLANT: ICD-10-CM

## 2024-01-25 DIAGNOSIS — I70.0 AORTIC ATHEROSCLEROSIS: ICD-10-CM

## 2024-01-25 DIAGNOSIS — R29.898 WEAKNESS OF BOTH LOWER EXTREMITIES: Primary | ICD-10-CM

## 2024-01-25 DIAGNOSIS — K22.2 ESOPHAGEAL STRICTURE: ICD-10-CM

## 2024-01-25 DIAGNOSIS — F41.9 ANXIETY: Chronic | ICD-10-CM

## 2024-01-25 DIAGNOSIS — F33.9 DEPRESSION, RECURRENT: ICD-10-CM

## 2024-01-25 DIAGNOSIS — D69.6 THROMBOCYTOPENIA: ICD-10-CM

## 2024-01-25 DIAGNOSIS — Z78.0 MENOPAUSE: ICD-10-CM

## 2024-01-25 DIAGNOSIS — G47.01 INSOMNIA DUE TO MEDICAL CONDITION: ICD-10-CM

## 2024-01-25 DIAGNOSIS — N89.8 VAGINAL DRYNESS: ICD-10-CM

## 2024-01-25 PROBLEM — F13.20 BARBITURATE DEPENDENCE: Status: RESOLVED | Noted: 2023-09-27 | Resolved: 2024-01-25

## 2024-01-25 LAB
AFP SERPL-MCNC: <2 NG/ML (ref 0–8.4)
ALBUMIN SERPL BCP-MCNC: 2.7 G/DL (ref 3.5–5.2)
ALP SERPL-CCNC: 512 U/L (ref 55–135)
ALT SERPL W/O P-5'-P-CCNC: 58 U/L (ref 10–44)
ANION GAP SERPL CALC-SCNC: 7 MMOL/L (ref 8–16)
AST SERPL-CCNC: 78 U/L (ref 10–40)
BILIRUB SERPL-MCNC: 2.2 MG/DL (ref 0.1–1)
BUN SERPL-MCNC: 34 MG/DL (ref 6–20)
CALCIUM SERPL-MCNC: 9 MG/DL (ref 8.7–10.5)
CHLORIDE SERPL-SCNC: 103 MMOL/L (ref 95–110)
CO2 SERPL-SCNC: 23 MMOL/L (ref 23–29)
CREAT SERPL-MCNC: 1.1 MG/DL (ref 0.5–1.4)
EST. GFR  (NO RACE VARIABLE): 59.3 ML/MIN/1.73 M^2
GLUCOSE SERPL-MCNC: 79 MG/DL (ref 70–110)
INR PPP: 1.1 (ref 0.8–1.2)
MAGNESIUM SERPL-MCNC: 1.7 MG/DL (ref 1.6–2.6)
POTASSIUM SERPL-SCNC: 4.5 MMOL/L (ref 3.5–5.1)
PROT SERPL-MCNC: 6.2 G/DL (ref 6–8.4)
PROTHROMBIN TIME: 12.4 SEC (ref 9–12.5)
SODIUM SERPL-SCNC: 133 MMOL/L (ref 136–145)

## 2024-01-25 PROCEDURE — 80053 COMPREHEN METABOLIC PANEL: CPT | Performed by: INTERNAL MEDICINE

## 2024-01-25 PROCEDURE — 85610 PROTHROMBIN TIME: CPT | Mod: PO | Performed by: INTERNAL MEDICINE

## 2024-01-25 PROCEDURE — 3008F BODY MASS INDEX DOCD: CPT | Mod: CPTII,S$GLB,, | Performed by: INTERNAL MEDICINE

## 2024-01-25 PROCEDURE — 1160F RVW MEDS BY RX/DR IN RCRD: CPT | Mod: CPTII,S$GLB,, | Performed by: INTERNAL MEDICINE

## 2024-01-25 PROCEDURE — 83735 ASSAY OF MAGNESIUM: CPT | Performed by: INTERNAL MEDICINE

## 2024-01-25 PROCEDURE — 80197 ASSAY OF TACROLIMUS: CPT | Performed by: INTERNAL MEDICINE

## 2024-01-25 PROCEDURE — 82105 ALPHA-FETOPROTEIN SERUM: CPT | Performed by: INTERNAL MEDICINE

## 2024-01-25 PROCEDURE — 85025 COMPLETE CBC W/AUTO DIFF WBC: CPT | Performed by: INTERNAL MEDICINE

## 2024-01-25 PROCEDURE — 3074F SYST BP LT 130 MM HG: CPT | Mod: CPTII,S$GLB,, | Performed by: INTERNAL MEDICINE

## 2024-01-25 PROCEDURE — 99999 PR PBB SHADOW E&M-EST. PATIENT-LVL V: CPT | Mod: PBBFAC,,, | Performed by: INTERNAL MEDICINE

## 2024-01-25 PROCEDURE — 3078F DIAST BP <80 MM HG: CPT | Mod: CPTII,S$GLB,, | Performed by: INTERNAL MEDICINE

## 2024-01-25 PROCEDURE — 99214 OFFICE O/P EST MOD 30 MIN: CPT | Mod: S$GLB,,, | Performed by: INTERNAL MEDICINE

## 2024-01-25 PROCEDURE — 36415 COLL VENOUS BLD VENIPUNCTURE: CPT | Mod: PO | Performed by: INTERNAL MEDICINE

## 2024-01-25 PROCEDURE — 1159F MED LIST DOCD IN RCRD: CPT | Mod: CPTII,S$GLB,, | Performed by: INTERNAL MEDICINE

## 2024-01-25 RX ORDER — CLONAZEPAM 0.5 MG/1
0.5 TABLET ORAL 2 TIMES DAILY
Qty: 60 TABLET | Refills: 5 | Status: SHIPPED | OUTPATIENT
Start: 2024-01-25 | End: 2024-07-23

## 2024-01-25 NOTE — PROGRESS NOTES
"[facility]  Primary Care   [address]       Patient ID: Elda Hernandez     Chief Complaint:   Chief Complaint   Patient presents with    Follow-up        HPI:  Routine follow-up, and since our last office visit, she had 2 separate hospitalizations and 1 trip to the ER.  The 1st hospitalization, she had extreme nausea and vomiting and there was some concern about an esophageal tear.  Though there was an outpouching of her esophagus in a focal area, there was no tear I am happy about that.  She did have a drop in her blood that was successfully treated.  She has a known esophageal stricture just distal to that outpouching in does need a repeat EGD at some point in the future.  She elects to see the GI doctor she saw in the hospital so send that referral.  During her next hospitalization, she has some mental status changes which I am not sure we know exactly the reason for.  In any case she returned to baseline mental status and was discharged.  Her last trip to the local ER [place], she got weak in the bathroom and fell down but thankfully did not have any lasting injuries.  She does admit to some leg weakness so I will order home health for physical therapy and she might graduate to outpatient physical therapy after some time.  Vital signs do look good and I do not think her blood pressure is too high or too low.  She does need a refill on a few medicines-namely clonazepam that controls her insomnia and anxiety.  She is interested in a referral to gyn for some vaginal dryness so I have placed that referral. DEXA ordered.     Review of Systems       Leg weakness     Objective:      Physical Exam   Physical Exam       Normal    Vitals:   Vitals:    01/25/24 0929   BP: 114/62   Pulse: 61   SpO2: 99%   Weight: 52.5 kg (115 lb 11.9 oz)   Height: 5' 4" (1.626 m)        Assessment:           Plan:       Elda Hernandez  was seen today for follow-up and may need lab work.    Diagnoses " and all orders for this visit:    Elda Doran was seen today for follow-up.    Diagnoses and all orders for this visit:    Weakness of both lower extremities  -     Ambulatory referral/consult to Home Health; Future    Esophageal stricture  -     Ambulatory referral/consult to Gastroenterology; Future  Needs repeat EGD   Menopause  -     DXA Bone Density Axial Skeleton 1 or more sites; Future    Insomnia due to medical condition  -     clonazePAM (KLONOPIN) 0.5 MG tablet; Take 1 tablet (0.5 mg total) by mouth 2 (two) times daily.  Controlled with med     Vaginal dryness  -     Ambulatory referral/consult to Obstetrics / Gynecology; Future    Anxiety  Controlled with med     Hypothyroidism due to acquired atrophy of thyroid  Controlled with med     Thrombocytopenia  Monitor     Depression, recurrent  Monitor     Aortic atherosclerosis  Monitor     Drug-induced peripheral neuropathy  Monitor            David Lorenzo MD

## 2024-01-26 ENCOUNTER — TELEPHONE (OUTPATIENT)
Dept: TRANSPLANT | Facility: CLINIC | Age: 54
End: 2024-01-26
Payer: MEDICARE

## 2024-01-26 ENCOUNTER — PATIENT MESSAGE (OUTPATIENT)
Dept: TRANSPLANT | Facility: CLINIC | Age: 54
End: 2024-01-26
Payer: MEDICARE

## 2024-01-26 DIAGNOSIS — Z94.4 LIVER REPLACED BY TRANSPLANT: Primary | ICD-10-CM

## 2024-01-26 DIAGNOSIS — C22.0 HEPATOCELLULAR CARCINOMA: ICD-10-CM

## 2024-01-26 LAB
BASOPHILS # BLD AUTO: 0.03 K/UL (ref 0–0.2)
BASOPHILS NFR BLD: 0.9 % (ref 0–1.9)
DIFFERENTIAL METHOD BLD: ABNORMAL
EOSINOPHIL # BLD AUTO: 0.1 K/UL (ref 0–0.5)
EOSINOPHIL NFR BLD: 3.9 % (ref 0–8)
ERYTHROCYTE [DISTWIDTH] IN BLOOD BY AUTOMATED COUNT: 15.3 % (ref 11.5–14.5)
HCT VFR BLD AUTO: 27.9 % (ref 37–48.5)
HGB BLD-MCNC: 9.5 G/DL (ref 12–16)
IMM GRANULOCYTES # BLD AUTO: 0 K/UL (ref 0–0.04)
IMM GRANULOCYTES NFR BLD AUTO: 0 % (ref 0–0.5)
LYMPHOCYTES # BLD AUTO: 0.6 K/UL (ref 1–4.8)
LYMPHOCYTES NFR BLD: 16.7 % (ref 18–48)
MCH RBC QN AUTO: 30.7 PG (ref 27–31)
MCHC RBC AUTO-ENTMCNC: 34.1 G/DL (ref 32–36)
MCV RBC AUTO: 90 FL (ref 82–98)
MONOCYTES # BLD AUTO: 0.4 K/UL (ref 0.3–1)
MONOCYTES NFR BLD: 11.6 % (ref 4–15)
NEUTROPHILS # BLD AUTO: 2.2 K/UL (ref 1.8–7.7)
NEUTROPHILS NFR BLD: 66.9 % (ref 38–73)
NRBC BLD-RTO: 0 /100 WBC
PLATELET # BLD AUTO: 58 K/UL (ref 150–450)
PMV BLD AUTO: 14.2 FL (ref 9.2–12.9)
RBC # BLD AUTO: 3.09 M/UL (ref 4–5.4)
TACROLIMUS BLD-MCNC: 9 NG/ML (ref 5–15)
WBC # BLD AUTO: 3.35 K/UL (ref 3.9–12.7)

## 2024-01-26 NOTE — TELEPHONE ENCOUNTER
Sent patient message via portal to let her know labs are stable.  No medication changes, next labs due on 2/24/24      ----- Message from Jaya Nielsen MD sent at 1/26/2024 10:31 AM CST -----  Results reviewed. No action.

## 2024-01-29 ENCOUNTER — TELEPHONE (OUTPATIENT)
Dept: FAMILY MEDICINE | Facility: CLINIC | Age: 54
End: 2024-01-29
Payer: MEDICARE

## 2024-01-29 NOTE — TELEPHONE ENCOUNTER
----- Message from Mary Mcgraw sent at 1/29/2024  1:24 PM CST -----  Regarding: Medical Order Update  Contact: Luis Enrique with [facility] at [phone]  Type: Medical Order Update  Who Called: Luis Enrique with [facility] at [phone]    Additional Information: patient did not want to start home health this week, but next. He will try starting next Monday, 2/5. Please call and advise. Thank you

## 2024-02-02 ENCOUNTER — PATIENT MESSAGE (OUTPATIENT)
Dept: GASTROENTEROLOGY | Facility: CLINIC | Age: 54
End: 2024-02-02
Payer: MEDICARE

## 2024-02-05 ENCOUNTER — TELEPHONE (OUTPATIENT)
Dept: FAMILY MEDICINE | Facility: CLINIC | Age: 54
End: 2024-02-05
Payer: MEDICARE

## 2024-02-05 NOTE — TELEPHONE ENCOUNTER
----- Message from Denae Santiago, Patient Care Assistant sent at 2/5/2024  8:39 AM CST -----  Regarding: advice  Contact: Luis Enrique with [facility]  Type: Needs Medical Advice    Who Called:  Luis Enrique with [facility]    Best Call Back Number:      Additional Information: Luis Enrique with [facility] states she would like a callback refusing home health care. Please call to advise. Thanks!

## 2024-02-05 NOTE — TELEPHONE ENCOUNTER
I highly recommend some kind of Physical Therapy either through Home Health or going to outpatient Physical Therapy.

## 2024-02-06 NOTE — TELEPHONE ENCOUNTER
Tried to call pt and was unsuccessful in reaching her. LVM. [facility],

## 2024-02-12 ENCOUNTER — TELEPHONE (OUTPATIENT)
Dept: GASTROENTEROLOGY | Facility: CLINIC | Age: 54
End: 2024-02-12
Payer: MEDICARE

## 2024-02-15 ENCOUNTER — TELEPHONE (OUTPATIENT)
Dept: NEUROLOGY | Facility: CLINIC | Age: 54
End: 2024-02-15
Payer: MEDICARE

## 2024-02-15 NOTE — TELEPHONE ENCOUNTER
EEG results received from [facility]. Placed in Dr. Ott basket for review when she returns to clinic.

## 2024-02-18 DIAGNOSIS — G43.709 CHRONIC MIGRAINE WITHOUT AURA WITHOUT STATUS MIGRAINOSUS, NOT INTRACTABLE: ICD-10-CM

## 2024-02-18 DIAGNOSIS — G44.229 CHRONIC TENSION-TYPE HEADACHE, NOT INTRACTABLE: Primary | ICD-10-CM

## 2024-02-19 RX ORDER — BUTALBITAL AND ACETAMINOPHEN 325; 50 MG/1; MG/1
TABLET ORAL
Qty: 60 TABLET | Refills: 0 | Status: SHIPPED | OUTPATIENT
Start: 2024-02-19 | End: 2024-03-21 | Stop reason: SDUPTHER

## 2024-02-19 NOTE — TELEPHONE ENCOUNTER
No care due was identified.  Health [facility] Embedded Care Due Messages. Reference number: [id number].   2/18/2024 10:45:11 PM CST   present

## 2024-02-19 NOTE — TELEPHONE ENCOUNTER
Refill Routing Note   Medication(s) are not appropriate for processing by [facility] for the following reason(s):        Outside of protocol    [facility] action(s):  Route               Appointments  past 12m or future 3m with PCP    Date Provider   Last Visit   1/25/2024 David Lorenzo MD   Next Visit   7/25/2024 David Lorenzo MD   ED visits in past 90 days: 0        Note composed:8:22 AM 02/19/2024

## 2024-02-22 ENCOUNTER — TELEPHONE (OUTPATIENT)
Dept: NEUROLOGY | Facility: CLINIC | Age: 54
End: 2024-02-22
Payer: MEDICARE

## 2024-02-22 NOTE — TELEPHONE ENCOUNTER
2 day ambulatory [facility] EEG reviewed. This shows some focal slowing over the right hemisphere. This is a nonspecific finding and doesn't necessarily mean seizures are occurring as no signs of seizure activity were seen.     We could empirically trial a seizure medication but would like to have a followup scheduled to discuss further

## 2024-02-26 ENCOUNTER — TELEPHONE (OUTPATIENT)
Dept: GASTROENTEROLOGY | Facility: CLINIC | Age: 54
End: 2024-02-26
Payer: MEDICARE

## 2024-02-26 NOTE — TELEPHONE ENCOUNTER
Attempted to contact patient to see if she wants to reschedule Gastro appointment. Left message on voicemail.

## 2024-02-28 ENCOUNTER — TELEPHONE (OUTPATIENT)
Dept: NEUROLOGY | Facility: CLINIC | Age: 54
End: 2024-02-28
Payer: MEDICARE

## 2024-02-28 NOTE — TELEPHONE ENCOUNTER
----- Message from Mary Mcgraw sent at 2/28/2024 12:53 PM CST -----  Regarding: Appointment Reschedule Request  Contact: patient at [phone]  Type:  Appointment Reschedule Request    Name of Caller:  patient at [phone]    Additional Information:  needed to cancel today's appointment and would like to reschedule for 3/1. Please call and advise. Thank you

## 2024-02-28 NOTE — TELEPHONE ENCOUNTER
Called patient and left voice mail that Constance Vazquez does not have any openings on 3/1 however her appointment for Botox is already authorized and we can do the follow up appointment after she comes in on 3/7. Requested call back.

## 2024-03-16 ENCOUNTER — LAB VISIT (OUTPATIENT)
Dept: LAB | Facility: HOSPITAL | Age: 54
End: 2024-03-16
Attending: INTERNAL MEDICINE
Payer: MEDICARE

## 2024-03-16 DIAGNOSIS — C22.0 HEPATOCELLULAR CARCINOMA: ICD-10-CM

## 2024-03-16 DIAGNOSIS — Z94.4 LIVER REPLACED BY TRANSPLANT: ICD-10-CM

## 2024-03-16 LAB
AFP SERPL-MCNC: <2 NG/ML (ref 0–8.4)
ALBUMIN SERPL BCP-MCNC: 2.6 G/DL (ref 3.5–5.2)
ALP SERPL-CCNC: 689 U/L (ref 55–135)
ALT SERPL W/O P-5'-P-CCNC: 57 U/L (ref 10–44)
ANION GAP SERPL CALC-SCNC: 9 MMOL/L (ref 8–16)
AST SERPL-CCNC: 89 U/L (ref 10–40)
BASOPHILS # BLD AUTO: 0.02 K/UL (ref 0–0.2)
BASOPHILS NFR BLD: 0.7 % (ref 0–1.9)
BILIRUB SERPL-MCNC: 4 MG/DL (ref 0.1–1)
BUN SERPL-MCNC: 15 MG/DL (ref 6–20)
CALCIUM SERPL-MCNC: 8.5 MG/DL (ref 8.7–10.5)
CHLORIDE SERPL-SCNC: 102 MMOL/L (ref 95–110)
CO2 SERPL-SCNC: 21 MMOL/L (ref 23–29)
CREAT SERPL-MCNC: 0.9 MG/DL (ref 0.5–1.4)
DIFFERENTIAL METHOD BLD: ABNORMAL
EOSINOPHIL # BLD AUTO: 0.2 K/UL (ref 0–0.5)
EOSINOPHIL NFR BLD: 8.6 % (ref 0–8)
ERYTHROCYTE [DISTWIDTH] IN BLOOD BY AUTOMATED COUNT: 14.2 % (ref 11.5–14.5)
EST. GFR  (NO RACE VARIABLE): >60 ML/MIN/1.73 M^2
GLUCOSE SERPL-MCNC: 83 MG/DL (ref 70–110)
HCT VFR BLD AUTO: 31.3 % (ref 37–48.5)
HGB BLD-MCNC: 10.7 G/DL (ref 12–16)
IMM GRANULOCYTES # BLD AUTO: 0.01 K/UL (ref 0–0.04)
IMM GRANULOCYTES NFR BLD AUTO: 0.4 % (ref 0–0.5)
INR PPP: 1.3 (ref 0.8–1.2)
LYMPHOCYTES # BLD AUTO: 0.6 K/UL (ref 1–4.8)
LYMPHOCYTES NFR BLD: 21.6 % (ref 18–48)
MCH RBC QN AUTO: 30.3 PG (ref 27–31)
MCHC RBC AUTO-ENTMCNC: 34.2 G/DL (ref 32–36)
MCV RBC AUTO: 89 FL (ref 82–98)
MONOCYTES # BLD AUTO: 0.3 K/UL (ref 0.3–1)
MONOCYTES NFR BLD: 11.5 % (ref 4–15)
NEUTROPHILS # BLD AUTO: 1.6 K/UL (ref 1.8–7.7)
NEUTROPHILS NFR BLD: 57.2 % (ref 38–73)
NRBC BLD-RTO: 0 /100 WBC
PLATELET # BLD AUTO: 60 K/UL (ref 150–450)
PMV BLD AUTO: 11 FL (ref 9.2–12.9)
POTASSIUM SERPL-SCNC: 4 MMOL/L (ref 3.5–5.1)
PROT SERPL-MCNC: 6.2 G/DL (ref 6–8.4)
PROTHROMBIN TIME: 13.9 SEC (ref 9–12.5)
RBC # BLD AUTO: 3.53 M/UL (ref 4–5.4)
SODIUM SERPL-SCNC: 132 MMOL/L (ref 136–145)
WBC # BLD AUTO: 2.78 K/UL (ref 3.9–12.7)

## 2024-03-16 PROCEDURE — 85610 PROTHROMBIN TIME: CPT | Performed by: INTERNAL MEDICINE

## 2024-03-16 PROCEDURE — 80053 COMPREHEN METABOLIC PANEL: CPT | Performed by: INTERNAL MEDICINE

## 2024-03-16 PROCEDURE — 82105 ALPHA-FETOPROTEIN SERUM: CPT | Performed by: INTERNAL MEDICINE

## 2024-03-16 PROCEDURE — 36415 COLL VENOUS BLD VENIPUNCTURE: CPT | Performed by: INTERNAL MEDICINE

## 2024-03-16 PROCEDURE — 80197 ASSAY OF TACROLIMUS: CPT | Performed by: INTERNAL MEDICINE

## 2024-03-16 PROCEDURE — 85025 COMPLETE CBC W/AUTO DIFF WBC: CPT | Performed by: INTERNAL MEDICINE

## 2024-03-17 LAB — TACROLIMUS BLD-MCNC: 11.1 NG/ML (ref 5–15)

## 2024-03-20 ENCOUNTER — PATIENT MESSAGE (OUTPATIENT)
Dept: TRANSPLANT | Facility: CLINIC | Age: 54
End: 2024-03-20
Payer: MEDICARE

## 2024-03-20 ENCOUNTER — TELEPHONE (OUTPATIENT)
Dept: TRANSPLANT | Facility: CLINIC | Age: 54
End: 2024-03-20
Payer: MEDICARE

## 2024-03-20 NOTE — TELEPHONE ENCOUNTER
Sent patient message via portal to let her know labs are stable.  No medication changes, next labs due on 4/20.24    ----- Message from Jaya Nielsen MD sent at 3/17/2024  4:52 PM CDT -----  Results reviewed. No action.

## 2024-03-21 DIAGNOSIS — G43.709 CHRONIC MIGRAINE WITHOUT AURA WITHOUT STATUS MIGRAINOSUS, NOT INTRACTABLE: ICD-10-CM

## 2024-03-22 NOTE — TELEPHONE ENCOUNTER
No care due was identified.  [facility] Embedded Care Due Messages. Reference number: [id number].   3/21/2024 7:39:24 PM CDT

## 2024-03-23 ENCOUNTER — PATIENT MESSAGE (OUTPATIENT)
Dept: FAMILY MEDICINE | Facility: CLINIC | Age: 54
End: 2024-03-23
Payer: MEDICARE

## 2024-03-23 RX ORDER — BUTALBITAL AND ACETAMINOPHEN 325; 50 MG/1; MG/1
1 TABLET ORAL 2 TIMES DAILY PRN
Qty: 20 TABLET | Refills: 0 | Status: SHIPPED | OUTPATIENT
Start: 2024-03-23 | End: 2024-04-02

## 2024-03-25 DIAGNOSIS — E83.42 HYPOMAGNESEMIA: Primary | ICD-10-CM

## 2024-03-25 PROBLEM — N17.9 AKI (ACUTE KIDNEY INJURY): Status: RESOLVED | Noted: 2020-09-05 | Resolved: 2024-03-25

## 2024-04-04 ENCOUNTER — TELEPHONE (OUTPATIENT)
Dept: NEUROLOGY | Facility: CLINIC | Age: 54
End: 2024-04-04
Payer: MEDICARE

## 2024-04-05 DIAGNOSIS — I10 ESSENTIAL HYPERTENSION: ICD-10-CM

## 2024-04-05 DIAGNOSIS — I70.0 AORTIC ATHEROSCLEROSIS: ICD-10-CM

## 2024-04-05 DIAGNOSIS — F32.A DEPRESSION, UNSPECIFIED DEPRESSION TYPE: ICD-10-CM

## 2024-04-05 DIAGNOSIS — E78.00 HYPERCHOLESTEROLEMIA: ICD-10-CM

## 2024-04-05 DIAGNOSIS — I65.21 STENOSIS OF RIGHT CAROTID ARTERY: ICD-10-CM

## 2024-04-06 RX ORDER — VENLAFAXINE HYDROCHLORIDE 150 MG/1
150 CAPSULE, EXTENDED RELEASE ORAL DAILY
Qty: 90 CAPSULE | Refills: 3 | OUTPATIENT
Start: 2024-04-06

## 2024-04-06 RX ORDER — RAMIPRIL 5 MG/1
5 CAPSULE ORAL DAILY
Qty: 90 CAPSULE | Refills: 3 | OUTPATIENT
Start: 2024-04-06

## 2024-04-06 NOTE — TELEPHONE ENCOUNTER
No care due was identified.  [facility] Embedded Care Due Messages. Reference number: [id number].   4/05/2024 8:58:59 PM CDT

## 2024-04-08 RX ORDER — PRAVASTATIN SODIUM 20 MG/1
20 TABLET ORAL DAILY
Qty: 90 TABLET | Refills: 1 | Status: SHIPPED | OUTPATIENT
Start: 2024-04-08

## 2024-04-13 ENCOUNTER — LAB VISIT (OUTPATIENT)
Dept: LAB | Facility: HOSPITAL | Age: 54
End: 2024-04-13
Attending: INTERNAL MEDICINE
Payer: MEDICARE

## 2024-04-13 DIAGNOSIS — Z94.4 LIVER REPLACED BY TRANSPLANT: ICD-10-CM

## 2024-04-13 DIAGNOSIS — E83.42 HYPOMAGNESEMIA: ICD-10-CM

## 2024-04-13 LAB
ALBUMIN SERPL BCP-MCNC: 2.7 G/DL (ref 3.5–5.2)
ALP SERPL-CCNC: 502 U/L (ref 55–135)
ALT SERPL W/O P-5'-P-CCNC: 37 U/L (ref 10–44)
ANION GAP SERPL CALC-SCNC: 11 MMOL/L (ref 8–16)
AST SERPL-CCNC: 64 U/L (ref 10–40)
BASOPHILS # BLD AUTO: 0.03 K/UL (ref 0–0.2)
BASOPHILS NFR BLD: 1.1 % (ref 0–1.9)
BILIRUB SERPL-MCNC: 3.6 MG/DL (ref 0.1–1)
BUN SERPL-MCNC: 44 MG/DL (ref 6–20)
CALCIUM SERPL-MCNC: 8.8 MG/DL (ref 8.7–10.5)
CHLORIDE SERPL-SCNC: 102 MMOL/L (ref 95–110)
CO2 SERPL-SCNC: 21 MMOL/L (ref 23–29)
CREAT SERPL-MCNC: 2.1 MG/DL (ref 0.5–1.4)
DIFFERENTIAL METHOD BLD: ABNORMAL
EOSINOPHIL # BLD AUTO: 0.2 K/UL (ref 0–0.5)
EOSINOPHIL NFR BLD: 6.8 % (ref 0–8)
ERYTHROCYTE [DISTWIDTH] IN BLOOD BY AUTOMATED COUNT: 14.7 % (ref 11.5–14.5)
EST. GFR  (NO RACE VARIABLE): 27.3 ML/MIN/1.73 M^2
GLUCOSE SERPL-MCNC: 87 MG/DL (ref 70–110)
HCT VFR BLD AUTO: 31.1 % (ref 37–48.5)
HGB BLD-MCNC: 10.4 G/DL (ref 12–16)
IMM GRANULOCYTES # BLD AUTO: 0 K/UL (ref 0–0.04)
IMM GRANULOCYTES NFR BLD AUTO: 0 % (ref 0–0.5)
INR PPP: 1.2 (ref 0.8–1.2)
LYMPHOCYTES # BLD AUTO: 0.7 K/UL (ref 1–4.8)
LYMPHOCYTES NFR BLD: 24.6 % (ref 18–48)
MAGNESIUM SERPL-MCNC: 1.7 MG/DL (ref 1.6–2.6)
MCH RBC QN AUTO: 31.6 PG (ref 27–31)
MCHC RBC AUTO-ENTMCNC: 33.4 G/DL (ref 32–36)
MCV RBC AUTO: 95 FL (ref 82–98)
MONOCYTES # BLD AUTO: 0.4 K/UL (ref 0.3–1)
MONOCYTES NFR BLD: 13.2 % (ref 4–15)
NEUTROPHILS # BLD AUTO: 1.5 K/UL (ref 1.8–7.7)
NEUTROPHILS NFR BLD: 54.3 % (ref 38–73)
NRBC BLD-RTO: 0 /100 WBC
PLATELET # BLD AUTO: 61 K/UL (ref 150–450)
PMV BLD AUTO: 12.5 FL (ref 9.2–12.9)
POTASSIUM SERPL-SCNC: 4.2 MMOL/L (ref 3.5–5.1)
PROT SERPL-MCNC: 6.4 G/DL (ref 6–8.4)
PROTHROMBIN TIME: 13.2 SEC (ref 9–12.5)
RBC # BLD AUTO: 3.29 M/UL (ref 4–5.4)
SODIUM SERPL-SCNC: 134 MMOL/L (ref 136–145)
WBC # BLD AUTO: 2.81 K/UL (ref 3.9–12.7)

## 2024-04-13 PROCEDURE — 80197 ASSAY OF TACROLIMUS: CPT | Performed by: INTERNAL MEDICINE

## 2024-04-13 PROCEDURE — 36415 COLL VENOUS BLD VENIPUNCTURE: CPT | Mod: PO | Performed by: INTERNAL MEDICINE

## 2024-04-13 PROCEDURE — 83735 ASSAY OF MAGNESIUM: CPT | Performed by: INTERNAL MEDICINE

## 2024-04-13 PROCEDURE — 85610 PROTHROMBIN TIME: CPT | Performed by: INTERNAL MEDICINE

## 2024-04-13 PROCEDURE — 85025 COMPLETE CBC W/AUTO DIFF WBC: CPT | Performed by: INTERNAL MEDICINE

## 2024-04-13 PROCEDURE — 80053 COMPREHEN METABOLIC PANEL: CPT | Performed by: INTERNAL MEDICINE

## 2024-04-14 LAB — TACROLIMUS BLD-MCNC: 8.4 NG/ML (ref 5–15)

## 2024-04-16 NOTE — PROGRESS NOTES
"[facility]  Adult Nutrition  Progress Note    SUMMARY       Recommendations    1.) Advance diet as tolerated to low fiber per SLP texture recommendations once medically appropriate.   2.) If EN warranted, continue Impact Peptide 1.5; goal rate at 35mL/hr. Begin at 10mL/hr and advance 5-10mL Q8hr as tolerated to goal rate at 35mL/hr. EN provides 1260kcal, 78gm of protein, 646mL of free water. Add free water per MD recommendations. Hold EN for residuals >500cc.   3.) Monitor electrolytes and replace as needed.   4.) Continue TPN of 70gm AA, 100gm of dextrose, 50gm of lipids to provide 1120kcal.     Goals: pt to tolerate >85% of EEN/EPN by RD follow up  Nutrition Goal Status: new  Communication of RD Recs: other (comment)(POC)    Reason for Assessment    Reason For Assessment: RD follow-up  Diagnosis: (gastric outlet obstruction)  Relevant Medical History: Esophageal stricture; HTN; Liver fibrosis; Seizures; SCC; HTN; S/p liver tx  Interdisciplinary Rounds: did not attend  General Information Comments: Pt confused. s/p ex lap on 9/4, gastric perforation discovered. GI- LBM 9/14. s/p PEG with 75mL of residuals. Nsg staff reports PEG is leaking, trickle feeds not initiated yet. Pt has TPN at 40mL/hr. NFPE completed 9/6, pt meets ASPEN guidelines for moderate malnutrition.   Nutrition Discharge Planning: unable to determine    Nutrition Risk Screen    Nutrition Risk Screen: tube feeding or parenteral nutrition    Nutrition/Diet History    Spiritual, Cultural Beliefs, Synagogue Practices, Values that Affect Care: no  Food Allergies: NKFA  Factors Affecting Nutritional Intake: altered gastrointestinal function, NPO    Anthropometrics    Temp: (!) 100.6 °F (38.1 °C)  Height Method: Stated  Height: 4' 11" (149.9 cm)  Height (inches): 59 in  Weight Method: Bed Scale  Weight: 68 kg (149 lb 14.6 oz)  Weight (lb): 149.91 lb  Ideal Body Weight (IBW), Female: 95 lb  % Ideal Body Weight, Female (lb): 129.96 " "Subjective:   Balwinder Grimes is a 71 y.o. male here today for     HPI: Patient is here to discuss:  Problem   Alcoholism in Remission (Hcc)    Hospitalized in October with subsequent complications.  Patient has not had an alcoholic beverage since October 17, 2023 4/16/24: has not had a drink in six months. Feels good. Is not taking naltrexone. Does not feel he needs     Benign Prostatic Hyperplasia Without Lower Urinary Tract Symptoms          Current medicines (including changes today)  Current Outpatient Medications   Medication Sig Dispense Refill    rosuvastatin (CRESTOR) 20 MG Tab Take 1 Tablet by mouth every evening. 90 Tablet 3    DULoxetine (CYMBALTA) 30 MG Cap DR Particles Take 1 Capsule by mouth every day. 90 Capsule 3    doxazosin (CARDURA) 4 MG Tab Take 1 Tablet by mouth every evening. 90 Tablet 3    losartan (COZAAR) 50 MG Tab Take 1 Tablet by mouth every day. 90 Tablet 2    B Complex Vitamins (B COMPLEX 1 PO) Take 1 Tablet by mouth every day.      Cholecalciferol (VITAMIN D3 PO) Take 1 Tablet by mouth every day.       No current facility-administered medications for this visit.     He  has a past medical history of High cholesterol, Hypertension, Numbness and tingling, and Pain (08/2018).  Patient has no known allergies.     Social History and Family History were reviewed and updated.    ROS   No headaches, chest pain, no shortness of breath, abdominal pain, nausea, or vomiting.  All other systems were reviewed and are negative or noted as positive in the HPI.       Objective:     /60 (BP Location: Left arm, Patient Position: Sitting, BP Cuff Size: Adult)   Pulse 66   Temp 36.9 °C (98.4 °F) (Temporal)   Ht 1.88 m (6' 2\")   Wt 95.7 kg (211 lb)   SpO2 95%  Body mass index is 27.09 kg/m².     Physical Exam:  General: Patient appears well-nourished, well-hydrated, nontoxic  HEENT, normocephalic atraumatic, PERRLA, extraocular movements intact, nares are patent and clear  Neck: No visible " %  BMI (Calculated): 30.3  BMI Grade: 25 - 29.9 - overweight       Lab/Procedures/Meds    Pertinent Labs Reviewed: reviewed  Pertinent Labs Comments: Hgb 7.0, Hct 22.8, Na 146, BUN 56, Cr 1.5, GFR 39.8, Glucose 125, Ca 7.8, Alb 1.7,   Pertinent Medications Reviewed: reviewed  Pertinent Medications Comments: Albuterol, diflucan, levothyroxine, pantoprazole, piperacillin, tacrolimus    Physical Findings/Assessment     Edema 2+ generalized  Skin left upper leg wound    Estimated/Assessed Needs    Weight Used For Calorie Calculations: 58 kg (127 lb 13.9 oz)  Energy Calorie Requirements (kcal): 1314  Energy Need Method: Heard-St Jeor(x1.2 PAL)  Protein Requirements: 69-75gm (1.2-1.3gm/kg)  Weight Used For Protein Calculations: 58 kg (127 lb 13.9 oz)  Fluid Requirements (mL): 1 mL/kcal or per MD     RDA Method (mL): 1314         Nutrition Prescription Ordered    Current Diet Order: NPO (9/4)  Current Nutrition Support Formula Ordered: (Custom TPN)  Current Nutrition Support Rate Ordered: 40 (ml)  Current Nutrition Support Frequency Ordered: mL/hr    Evaluation of Received Nutrient/Fluid Intake    Enteral Calories (kcal): 0  Enteral Protein (gm): 0  Enteral (Free Water) Fluid (mL): 0  Parenteral Calories (kcal): 1120  Parenteral Protein (gm): 70  Parenteral Fluid (mL): 1210  Lipid Calories (kcals): 500 kcals  GIR (Glucose Infusion Rate) (mg/kg/min): 1 mg/kg/min  Other Calories (kcal): 0  % Kcal Needs: 100  % Protein Needs: 100  I/O: +13.3L since admission  Energy Calories Required: meeting needs  Protein Required: meeting needs  Fluid Required: other (see comments)  Comments: LBM 9/14  Tolerance: tolerating  % Intake of Estimated Energy Needs: 100  % Meal Intake: 0    Nutrition Risk    Level of Risk/Frequency of Follow-up: high , 2x weekly    Assessment and Plan     Moderate Protein-Calorie Malnutrition  Malnutrition in the context of Acute Illness/Injury     Related to (etiology):  Decreased intake      Signs and  masses, thyromegaly or abnormalities noted  Cardiovascular.  Sitting comfortably without visible signs of edema  Lungs: No cyanosis noted, nondyspneic  Skin: Well perfused without evidence of rash or lesions  Neurological: Cranial nerves II through XII intact, normal gait  Musculoskeletal: Normal range of motion, normal strength and no deficit noted     Clinical Course/Lab Analysis:        Assessment and Plan:   The following treatment plan was discussed.  Signs and symptoms for which to return were discussed with patient at length.  Patient verbalized understanding.    Problem List Items Addressed This Visit       AAA (abdominal aortic aneurysm) (HCC)    Relevant Medications    rosuvastatin (CRESTOR) 20 MG Tab    doxazosin (CARDURA) 4 MG Tab    losartan (COZAAR) 50 MG Tab    Primary hypertension    Relevant Medications    rosuvastatin (CRESTOR) 20 MG Tab    doxazosin (CARDURA) 4 MG Tab    losartan (COZAAR) 50 MG Tab    Other Relevant Orders    Comp Metabolic Panel    Benign prostatic hyperplasia without lower urinary tract symptoms    Relevant Medications    doxazosin (CARDURA) 4 MG Tab    Hyperlipidemia    Relevant Medications    rosuvastatin (CRESTOR) 20 MG Tab    doxazosin (CARDURA) 4 MG Tab    losartan (COZAAR) 50 MG Tab    Other Relevant Orders    LIPID PANEL    Prediabetes    Neuropathy    Relevant Medications    DULoxetine (CYMBALTA) 30 MG Cap DR Particles    Other Relevant Orders    CBC WITH DIFFERENTIAL    TSH WITH REFLEX TO FT4    Alcoholism in remission (HCC)     Chronic: improving  He does not want naltrexone at this point            Other Visit Diagnoses       Elevated glucose        Relevant Orders    Comp Metabolic Panel    HEMOGLOBIN A1C             Alcoholism: chronic and unstable  Starting naltrexone.  He defers on gabapentin.  Does not like side effects.  This is reasonable.  Do suggest starting naltrexone to help.  He is done very well. Recommend AA     1.  Abdominal aortic aneurysm, chronic  Symptoms (as evidenced by):  Energy Intake: <50% of estimated energy requirement for >3 weeks   Muscle Mass Depletion: mild depletion of temples   Weight Loss: 11% x 3 weeks per family   Fluid Accumulation: moderate     Interventions(treatment strategy):  Collaboration of care with other providers     Nutrition Diagnosis Status:  ongoing     Monitor and Evaluation    Food and Nutrient Intake: energy intake, food and beverage intake, enteral nutrition intake  Food and Nutrient Adminstration: enteral and parenteral nutrition administration, diet order  Knowledge/Beliefs/Attitudes: food and nutrition knowledge/skill  Anthropometric Measurements: weight, weight change  Biochemical Data, Medical Tests and Procedures: electrolyte and renal panel, gastrointestinal profile, glucose/endocrine profile, inflammatory profile, lipid profile  Nutrition-Focused Physical Findings: overall appearance     Malnutrition Assessment  Malnutrition Type: acute illness or injury          Weight Loss (Malnutrition): (11% x 3 weeks)  Energy Intake (Malnutrition): less than or equal to 50% for greater than or equal to 1 month   Orbital Region (Subcutaneous Fat Loss): mild depletion  Upper Arm Region (Subcutaneous Fat Loss): well nourished   Mormon Region (Muscle Loss): mild depletion  Clavicle Bone Region (Muscle Loss): well nourished  Clavicle and Acromion Bone Region (Muscle Loss): well nourished                 Nutrition Follow-Up    RD Follow-up?: Yes     condition: Patient has had incidental finding of a AAA that was approximately 2.8 cm in diameter.  He did get serial ultrasounds that showed stability.  We did a repeat ultrasound in June 2023 and it is now at 3.8 cm in diameter.  Last ultrasound was approximately 6 years ago.  We will follow serially and order another 1 in June 2024 and I will send to vascular surgery if size increases by 0.5 cm or goes over the recommended repair limit     2.  Neuropathy, chronic and unstable: Stable right now on duloxetine 30 mg daily.  We wish to avoid gabapentin as he does not like how he feels on it     3.  Mixed hyperlipidemia, chronic condition that is stable.  Patient was on simvastatin for cholesterol.  The 10-year ASCVD risk score (Marilee MORALES, et al., 2019) is: 22.9%  I stop the pravastatin and started Crestor at a dose of 20 mg.  Initially months milligrams to make sure he can tolerate.  With an ASCVD of 22% he needs to be on high-dose     4.  Prediabetes new and unstable.  Hemoglobin A1c was six-point percent.  You got to cut out the sugars    5. BPH: chronic and unstable. Will increase doxazosin from 4mg to 8mg     Followup: 6 months    Please note that this dictation was created using voice recognition software. I have made every reasonable attempt to correct obvious errors, but I expect that there are errors of grammar and possibly content that I did not discover before finalizing the note.

## 2024-04-19 ENCOUNTER — TELEPHONE (OUTPATIENT)
Dept: TRANSPLANT | Facility: CLINIC | Age: 54
End: 2024-04-19
Payer: MEDICARE

## 2024-04-19 ENCOUNTER — PATIENT MESSAGE (OUTPATIENT)
Dept: TRANSPLANT | Facility: CLINIC | Age: 54
End: 2024-04-19
Payer: MEDICARE

## 2024-04-19 NOTE — TELEPHONE ENCOUNTER
Sent patient message via portal to let her know labs are stable.  No medication changes, next labs due on 5/18/24      ----- Message from Jaya Nielsen MD sent at 4/19/2024 10:14 AM CDT -----  Results reviewed. No action.

## 2024-04-24 ENCOUNTER — TELEPHONE (OUTPATIENT)
Dept: TRANSPLANT | Facility: CLINIC | Age: 54
End: 2024-04-24
Payer: MEDICARE

## 2024-04-24 NOTE — TELEPHONE ENCOUNTER
Called to inform patient of the Dr. Nielsen appointment being rescheduled due to the previous appointment being scheduled incorrectly. New appointment date and time is 6/18/24 for 10:30am. Voicemail also left with new appointment date and time. Appointment reminder sent to address on file.

## 2024-04-29 ENCOUNTER — TELEPHONE (OUTPATIENT)
Dept: RADIOLOGY | Facility: HOSPITAL | Age: 54
End: 2024-04-29
Payer: MEDICARE

## 2024-05-02 ENCOUNTER — TELEPHONE (OUTPATIENT)
Dept: RADIOLOGY | Facility: HOSPITAL | Age: 54
End: 2024-05-02
Payer: MEDICARE

## 2024-05-16 ENCOUNTER — TELEPHONE (OUTPATIENT)
Dept: NEUROLOGY | Facility: CLINIC | Age: 54
End: 2024-05-16
Payer: MEDICARE

## 2024-05-16 NOTE — TELEPHONE ENCOUNTER
"Pt self scheduled via the portal but has "Botox" in the note--called to clarify the reason for appt today and changed to Botox Procedure appt if that is what is requested. Portal message also sent to pt.   "

## 2024-05-25 ENCOUNTER — LAB VISIT (OUTPATIENT)
Dept: LAB | Facility: HOSPITAL | Age: 54
End: 2024-05-25
Attending: INTERNAL MEDICINE
Payer: MEDICARE

## 2024-05-25 DIAGNOSIS — Z94.4 LIVER REPLACED BY TRANSPLANT: ICD-10-CM

## 2024-05-25 DIAGNOSIS — E83.42 HYPOMAGNESEMIA: ICD-10-CM

## 2024-05-25 LAB
ALBUMIN SERPL BCP-MCNC: 2.3 G/DL (ref 3.5–5.2)
ALP SERPL-CCNC: 454 U/L (ref 55–135)
ALT SERPL W/O P-5'-P-CCNC: 57 U/L (ref 10–44)
ANION GAP SERPL CALC-SCNC: 5 MMOL/L (ref 8–16)
AST SERPL-CCNC: 91 U/L (ref 10–40)
BASOPHILS # BLD AUTO: 0.03 K/UL (ref 0–0.2)
BASOPHILS NFR BLD: 0.9 % (ref 0–1.9)
BILIRUB SERPL-MCNC: 3.5 MG/DL (ref 0.1–1)
BUN SERPL-MCNC: 19 MG/DL (ref 6–20)
CALCIUM SERPL-MCNC: 8.9 MG/DL (ref 8.7–10.5)
CHLORIDE SERPL-SCNC: 103 MMOL/L (ref 95–110)
CO2 SERPL-SCNC: 22 MMOL/L (ref 23–29)
CREAT SERPL-MCNC: 1 MG/DL (ref 0.5–1.4)
DIFFERENTIAL METHOD BLD: ABNORMAL
EOSINOPHIL # BLD AUTO: 0.2 K/UL (ref 0–0.5)
EOSINOPHIL NFR BLD: 6.1 % (ref 0–8)
ERYTHROCYTE [DISTWIDTH] IN BLOOD BY AUTOMATED COUNT: 15.2 % (ref 11.5–14.5)
EST. GFR  (NO RACE VARIABLE): >60 ML/MIN/1.73 M^2
GLUCOSE SERPL-MCNC: 86 MG/DL (ref 70–110)
HCT VFR BLD AUTO: 27.5 % (ref 37–48.5)
HGB BLD-MCNC: 9.2 G/DL (ref 12–16)
IMM GRANULOCYTES # BLD AUTO: 0.02 K/UL (ref 0–0.04)
IMM GRANULOCYTES NFR BLD AUTO: 0.6 % (ref 0–0.5)
INR PPP: 1.2 (ref 0.8–1.2)
LYMPHOCYTES # BLD AUTO: 0.8 K/UL (ref 1–4.8)
LYMPHOCYTES NFR BLD: 23 % (ref 18–48)
MAGNESIUM SERPL-MCNC: 1.5 MG/DL (ref 1.6–2.6)
MCH RBC QN AUTO: 30.5 PG (ref 27–31)
MCHC RBC AUTO-ENTMCNC: 33.5 G/DL (ref 32–36)
MCV RBC AUTO: 91 FL (ref 82–98)
MONOCYTES # BLD AUTO: 0.7 K/UL (ref 0.3–1)
MONOCYTES NFR BLD: 21.5 % (ref 4–15)
NEUTROPHILS # BLD AUTO: 1.6 K/UL (ref 1.8–7.7)
NEUTROPHILS NFR BLD: 47.9 % (ref 38–73)
NRBC BLD-RTO: 0 /100 WBC
PLATELET # BLD AUTO: 71 K/UL (ref 150–450)
PMV BLD AUTO: 11.6 FL (ref 9.2–12.9)
POTASSIUM SERPL-SCNC: 4.5 MMOL/L (ref 3.5–5.1)
PROT SERPL-MCNC: 5.8 G/DL (ref 6–8.4)
PROTHROMBIN TIME: 13.2 SEC (ref 9–12.5)
RBC # BLD AUTO: 3.02 M/UL (ref 4–5.4)
SODIUM SERPL-SCNC: 130 MMOL/L (ref 136–145)
WBC # BLD AUTO: 3.3 K/UL (ref 3.9–12.7)

## 2024-05-25 PROCEDURE — 80197 ASSAY OF TACROLIMUS: CPT | Performed by: INTERNAL MEDICINE

## 2024-05-25 PROCEDURE — 36415 COLL VENOUS BLD VENIPUNCTURE: CPT | Mod: PO | Performed by: INTERNAL MEDICINE

## 2024-05-25 PROCEDURE — 85025 COMPLETE CBC W/AUTO DIFF WBC: CPT | Performed by: INTERNAL MEDICINE

## 2024-05-25 PROCEDURE — 85610 PROTHROMBIN TIME: CPT | Performed by: INTERNAL MEDICINE

## 2024-05-25 PROCEDURE — 83735 ASSAY OF MAGNESIUM: CPT | Performed by: INTERNAL MEDICINE

## 2024-05-25 PROCEDURE — 80053 COMPREHEN METABOLIC PANEL: CPT | Performed by: INTERNAL MEDICINE

## 2024-05-26 LAB — TACROLIMUS BLD-MCNC: 4 NG/ML (ref 5–15)

## 2024-05-27 ENCOUNTER — TELEPHONE (OUTPATIENT)
Dept: TRANSPLANT | Facility: CLINIC | Age: 54
End: 2024-05-27
Payer: MEDICARE

## 2024-05-27 NOTE — TELEPHONE ENCOUNTER
Sent patient message via portal to let her know labs are stable.  No medication changes, next labs due on 622/24      ----- Message from Jaya Nielsen MD sent at 5/27/2024  8:11 AM CDT -----  Results reviewed. No action.

## 2024-05-29 ENCOUNTER — TELEPHONE (OUTPATIENT)
Dept: RADIOLOGY | Facility: HOSPITAL | Age: 54
End: 2024-05-29
Payer: MEDICARE

## 2024-06-11 ENCOUNTER — TELEPHONE (OUTPATIENT)
Dept: NEUROLOGY | Facility: CLINIC | Age: 54
End: 2024-06-11
Payer: MEDICARE

## 2024-06-11 NOTE — TELEPHONE ENCOUNTER
----- Message from Anna Mercado sent at 6/10/2024  5:08 PM CDT -----  Type :  Needs Medical Advice    Who Called: pt  Symptoms (please be specific): Trouble Walking   How long has patient had these symptoms:  Trouble Walking  Pharmacy name and phone #:  no  Would the patient rather a call back or a response via [facility]? call  Best Call Back Number: [phone]  Additional Information:  appt

## 2024-06-11 NOTE — TELEPHONE ENCOUNTER
----- Message from Anna Mercado sent at 6/10/2024  5:01 PM CDT -----  Type :  Needs Medical Advice    Who Called:  pt   Symptoms (please be specific):  Botox  How long has patient had these symptoms:  Botox  Pharmacy name and phone #:  no  Would the patient rather a call back or a response via [facility]? call  Best Call Back Number: [phone]  Additional Information: appt

## 2024-06-17 ENCOUNTER — OFFICE VISIT (OUTPATIENT)
Dept: TRANSPLANT | Facility: CLINIC | Age: 54
End: 2024-06-17
Attending: INTERNAL MEDICINE
Payer: MEDICARE

## 2024-06-17 ENCOUNTER — HOSPITAL ENCOUNTER (OUTPATIENT)
Dept: RADIOLOGY | Facility: HOSPITAL | Age: 54
Discharge: HOME | End: 2024-06-17
Attending: INTERNAL MEDICINE
Payer: MEDICARE

## 2024-06-17 VITALS
TEMPERATURE: 97 F | RESPIRATION RATE: 18 BRPM | OXYGEN SATURATION: 97 % | SYSTOLIC BLOOD PRESSURE: 137 MMHG | BODY MASS INDEX: 25.87 KG/M2 | HEART RATE: 64 BPM | HEIGHT: 59 IN | DIASTOLIC BLOOD PRESSURE: 63 MMHG | WEIGHT: 128.31 LBS

## 2024-06-17 DIAGNOSIS — R18.8 CIRRHOSIS OF LIVER WITH ASCITES, UNSPECIFIED HEPATIC CIRRHOSIS TYPE: Chronic | ICD-10-CM

## 2024-06-17 DIAGNOSIS — R18.8 OTHER ASCITES: ICD-10-CM

## 2024-06-17 DIAGNOSIS — Z94.4 S/P LIVER TRANSPLANT: Primary | Chronic | ICD-10-CM

## 2024-06-17 DIAGNOSIS — C22.0 HEPATOCELLULAR CARCINOMA: ICD-10-CM

## 2024-06-17 DIAGNOSIS — K74.60 CIRRHOSIS OF LIVER WITH ASCITES, UNSPECIFIED HEPATIC CIRRHOSIS TYPE: Chronic | ICD-10-CM

## 2024-06-17 DIAGNOSIS — T86.41 CHRONIC REJECTION OF LIVER TRANSPLANT: ICD-10-CM

## 2024-06-17 DIAGNOSIS — K74.69 DECOMPENSATED LIVER DISEASE: ICD-10-CM

## 2024-06-17 DIAGNOSIS — Z29.89 PROPHYLACTIC IMMUNOTHERAPY: Chronic | ICD-10-CM

## 2024-06-17 PROCEDURE — 99214 OFFICE O/P EST MOD 30 MIN: CPT | Mod: S$GLB,,, | Performed by: INTERNAL MEDICINE

## 2024-06-17 PROCEDURE — 3075F SYST BP GE 130 - 139MM HG: CPT | Mod: CPTII,S$GLB,, | Performed by: INTERNAL MEDICINE

## 2024-06-17 PROCEDURE — 1160F RVW MEDS BY RX/DR IN RCRD: CPT | Mod: CPTII,S$GLB,, | Performed by: INTERNAL MEDICINE

## 2024-06-17 PROCEDURE — 76705 ECHO EXAM OF ABDOMEN: CPT | Mod: TC

## 2024-06-17 PROCEDURE — 3008F BODY MASS INDEX DOCD: CPT | Mod: CPTII,S$GLB,, | Performed by: INTERNAL MEDICINE

## 2024-06-17 PROCEDURE — 3078F DIAST BP <80 MM HG: CPT | Mod: CPTII,S$GLB,, | Performed by: INTERNAL MEDICINE

## 2024-06-17 PROCEDURE — 1159F MED LIST DOCD IN RCRD: CPT | Mod: CPTII,S$GLB,, | Performed by: INTERNAL MEDICINE

## 2024-06-17 PROCEDURE — 99999 PR PBB SHADOW E&M-EST. PATIENT-LVL III: CPT | Mod: PBBFAC,,, | Performed by: INTERNAL MEDICINE

## 2024-06-17 PROCEDURE — 4010F ACE/ARB THERAPY RXD/TAKEN: CPT | Mod: CPTII,S$GLB,, | Performed by: INTERNAL MEDICINE

## 2024-06-17 RX ORDER — FUROSEMIDE 40 MG/1
40 TABLET ORAL DAILY
Qty: 90 TABLET | Refills: 3 | Status: SHIPPED | OUTPATIENT
Start: 2024-06-17 | End: 2025-06-17

## 2024-06-17 RX ORDER — SPIRONOLACTONE 50 MG/1
100 TABLET, FILM COATED ORAL DAILY
Qty: 90 TABLET | Refills: 3 | Status: SHIPPED | OUTPATIENT
Start: 2024-06-17 | End: 2025-06-17

## 2024-06-17 NOTE — PROGRESS NOTES
Transplant Hepatology  Liver Transplant Recipient Follow-up    Transplant Date: 9/23/2002  UNOS Native Liver Dx: METDIS: Glycogen Storage Disease Type I (GSD-I)    Elda Doran is here for follow up of her liver transplant.    ORGAN: LIVER  Whole or Partial: whole liver  Donor Type:   CDC High Risk:   Donor CMV Status: positive  Donor HCV Status: negative  Donor HBcAb: negative  Biliary Anastomosis:   Arterial Anatomy:   IVC reconstruction:   Portal vein status:       She has had the following complications since transplant: biliary stricture and chronic rejection. The noted complications needs to be addressed more thoroughly today.    Subjective:     Interval History:   This 54-year-old woman is 22 years status post liver transplant for glycogen storage disease.  Her appointment was supposed to be for tomorrow but she showed up today inadvertently.  She has had both a biliary stricture and chronic rejection with what appears to be cirrhosis posttransplant.  She had lengthy admission for perforated viscus following a gastric outlet obstruction several years ago.  She now has evidence of portal hypertension and hepatic decompensation likely on the basis of ductopenia rejection.  She has issues with salt and water retention as well as what appears to be hepatic encephalopathy for which he is on medical treatment.  She is on low-dose diuretics in the form of Lasix 20 mg daily and spironolactone 50 mg daily.  She has had some previous renal insufficiency but her most recent creatinine was 1.0.  She is on tacrolimus 0.5 mg twice daily.  She also has evidence of hepatocellular synthetic dysfunction.  We have talked previously about the prohibitive risk of retransplantation.  I will be increasing her diuretics to Lasix 40 mg daily and spironolactone 100 mg daily.  We will get follow-up blood work in a week's time to make sure that her renal function electrolytes are stable.    .Review of Systems   Constitutional:  Positive  for appetite change and fatigue. Negative for unexpected weight change.   HENT:  Negative for ear pain, hearing loss, sore throat and trouble swallowing.    Eyes:  Negative for visual disturbance.   Respiratory:  Negative for cough and shortness of breath.    Cardiovascular:  Negative for chest pain and palpitations.   Gastrointestinal:  Positive for abdominal distention. Negative for abdominal pain, nausea and vomiting.   Genitourinary:  Negative for difficulty urinating and dysuria.   Musculoskeletal:  Negative for arthralgias.   Skin:  Negative for rash.             Neurological:  Negative for tremors, seizures and headaches.   Psychiatric/Behavioral:  Positive for decreased concentration. Negative for agitation.        Objective:     Physical Exam  Constitutional:       Appearance: She is well-developed. She is not diaphoretic.   HENT:      Right Ear: External ear normal.      Left Ear: External ear normal.   Eyes:      General: No scleral icterus.  Cardiovascular:      Rate and Rhythm: Normal rate and regular rhythm.      Heart sounds: Normal heart sounds. No murmur heard.     No friction rub. No gallop.   Pulmonary:      Effort: Pulmonary effort is normal. No respiratory distress.      Breath sounds: Normal breath sounds. No wheezing.   Abdominal:      General: Bowel sounds are normal. There is no distension.      Palpations: Abdomen is soft. There is shifting dullness. There is no mass.      Tenderness: There is no abdominal tenderness.       Musculoskeletal:         General: Normal range of motion.   Lymphadenopathy:      Cervical: No cervical adenopathy.   Skin:     General: Skin is warm and dry.      Coloration: Skin is not pale.   Neurological:      Mental Status: She is alert and oriented to person, place, and time.       Lab Results   Component Value Date    BILITOT 3.5 (H) 05/25/2024    AST 91 (H) 05/25/2024    ALT 57 (H) 05/25/2024    ALKPHOS 454 (H) 05/25/2024    CREATININE 1.0 05/25/2024    ALBUMIN  2.3 (L) 05/25/2024     Lab Results   Component Value Date    WBC 3.30 (L) 05/25/2024    HGB 9.2 (L) 05/25/2024    HCT 27.5 (L) 05/25/2024    HCT 26 (L) 01/08/2024    PLT 71 (L) 05/25/2024     Lab Results   Component Value Date    TACROLIMUS 4.0 (L) 05/25/2024       Assessment/Plan:     1. 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 Other ascites       She will continue on tacrolimus 0.5 mg b.i.d.  I will increase her Lasix to 40 mg daily and spironolactone 100 mg daily.  We will get follow-up blood work in 1 week's time.  She has having a surveillance Doppler ultrasound this afternoon.  If there is significant ascites I will arrange a therapeutic paracentesis.  She will continue to require surveillance ultrasounds every 6 months.  She will return to clinic in 1 year's time.    Jaya Nielsen MD           [facility] Patient Status  Functional Status: 60%  Physical Capacity: No Limitations

## 2024-06-17 NOTE — Clinical Note
She came to her appointment on the wrong day.  I have increased her diuretics.  Can we get repeat blood work in a week's time?

## 2024-06-17 NOTE — LETTER
June 17, 2024        Jordana Woods  [address]  [place] [address]  Phone: [phone]  Fax: [phone]             [facility]  [address]  [facility] [address]  Phone: [phone]   Patient: Elda Hernandez   MR Number: [id number]   YOB: 1970   Date of Visit: 6/17/2024       Dear Dr. Jordana Woods    Thank you for referring Elda Hernandez to me for evaluation. Attached you will find relevant portions of my assessment and plan of care.    If you have questions, please do not hesitate to call me. I look forward to following Elda Hernandez along with you.    Sincerely,    Jaya Nielsen MD    Enclosure    If you would like to receive this communication electronically, please contact [email or web address] or [phone] to request its learning Link access.    its learning Link is a tool which provides read-only access to select patient information with whom you have a relationship. Its easy to use and provides real time access to review your patients record including encounter summaries, notes, results, and demographic information.    If you feel you have received this communication in error or would no longer like to receive these types of communications, please e-mail [email or web address]

## 2024-06-24 ENCOUNTER — PATIENT MESSAGE (OUTPATIENT)
Dept: TRANSPLANT | Facility: CLINIC | Age: 54
End: 2024-06-24
Payer: MEDICARE

## 2024-06-29 ENCOUNTER — LAB VISIT (OUTPATIENT)
Dept: LAB | Facility: HOSPITAL | Age: 54
End: 2024-06-29
Attending: INTERNAL MEDICINE
Payer: MEDICARE

## 2024-06-29 DIAGNOSIS — E83.42 HYPOMAGNESEMIA: ICD-10-CM

## 2024-06-29 DIAGNOSIS — Z94.4 LIVER REPLACED BY TRANSPLANT: ICD-10-CM

## 2024-06-29 LAB
ALBUMIN SERPL BCP-MCNC: 2.4 G/DL (ref 3.5–5.2)
ALP SERPL-CCNC: 458 U/L (ref 55–135)
ALT SERPL W/O P-5'-P-CCNC: 54 U/L (ref 10–44)
ANION GAP SERPL CALC-SCNC: 6 MMOL/L (ref 8–16)
AST SERPL-CCNC: 106 U/L (ref 10–40)
BASOPHILS # BLD AUTO: 0.03 K/UL (ref 0–0.2)
BASOPHILS NFR BLD: 1 % (ref 0–1.9)
BILIRUB SERPL-MCNC: 3.5 MG/DL (ref 0.1–1)
BUN SERPL-MCNC: 24 MG/DL (ref 6–20)
CALCIUM SERPL-MCNC: 8.7 MG/DL (ref 8.7–10.5)
CHLORIDE SERPL-SCNC: 104 MMOL/L (ref 95–110)
CO2 SERPL-SCNC: 23 MMOL/L (ref 23–29)
CREAT SERPL-MCNC: 1.8 MG/DL (ref 0.5–1.4)
DIFFERENTIAL METHOD BLD: ABNORMAL
EOSINOPHIL # BLD AUTO: 0.2 K/UL (ref 0–0.5)
EOSINOPHIL NFR BLD: 6 % (ref 0–8)
ERYTHROCYTE [DISTWIDTH] IN BLOOD BY AUTOMATED COUNT: 14.7 % (ref 11.5–14.5)
EST. GFR  (NO RACE VARIABLE): 32.7 ML/MIN/1.73 M^2
GLUCOSE SERPL-MCNC: 97 MG/DL (ref 70–110)
HCT VFR BLD AUTO: 29.6 % (ref 37–48.5)
HGB BLD-MCNC: 9.8 G/DL (ref 12–16)
IMM GRANULOCYTES # BLD AUTO: 0.01 K/UL (ref 0–0.04)
IMM GRANULOCYTES NFR BLD AUTO: 0.3 % (ref 0–0.5)
INR PPP: 1.2 (ref 0.8–1.2)
LYMPHOCYTES # BLD AUTO: 0.7 K/UL (ref 1–4.8)
LYMPHOCYTES NFR BLD: 23.3 % (ref 18–48)
MAGNESIUM SERPL-MCNC: 1.8 MG/DL (ref 1.6–2.6)
MCH RBC QN AUTO: 30.1 PG (ref 27–31)
MCHC RBC AUTO-ENTMCNC: 33.1 G/DL (ref 32–36)
MCV RBC AUTO: 91 FL (ref 82–98)
MONOCYTES # BLD AUTO: 0.4 K/UL (ref 0.3–1)
MONOCYTES NFR BLD: 13.3 % (ref 4–15)
NEUTROPHILS # BLD AUTO: 1.7 K/UL (ref 1.8–7.7)
NEUTROPHILS NFR BLD: 56.1 % (ref 38–73)
NRBC BLD-RTO: 0 /100 WBC
PLATELET # BLD AUTO: 77 K/UL (ref 150–450)
PMV BLD AUTO: 12.2 FL (ref 9.2–12.9)
POTASSIUM SERPL-SCNC: 4.1 MMOL/L (ref 3.5–5.1)
PROT SERPL-MCNC: 5.9 G/DL (ref 6–8.4)
PROTHROMBIN TIME: 13.2 SEC (ref 9–12.5)
RBC # BLD AUTO: 3.26 M/UL (ref 4–5.4)
SODIUM SERPL-SCNC: 133 MMOL/L (ref 136–145)
WBC # BLD AUTO: 3.01 K/UL (ref 3.9–12.7)

## 2024-06-29 PROCEDURE — 85025 COMPLETE CBC W/AUTO DIFF WBC: CPT | Performed by: INTERNAL MEDICINE

## 2024-06-29 PROCEDURE — 85610 PROTHROMBIN TIME: CPT | Performed by: INTERNAL MEDICINE

## 2024-06-29 PROCEDURE — 80197 ASSAY OF TACROLIMUS: CPT | Performed by: INTERNAL MEDICINE

## 2024-06-29 PROCEDURE — 80053 COMPREHEN METABOLIC PANEL: CPT | Performed by: INTERNAL MEDICINE

## 2024-06-29 PROCEDURE — 36415 COLL VENOUS BLD VENIPUNCTURE: CPT | Mod: PO | Performed by: INTERNAL MEDICINE

## 2024-06-29 PROCEDURE — 83735 ASSAY OF MAGNESIUM: CPT | Performed by: INTERNAL MEDICINE

## 2024-06-30 LAB — TACROLIMUS BLD-MCNC: 8.4 NG/ML (ref 5–15)

## 2024-07-01 DIAGNOSIS — R18.8 OTHER ASCITES: ICD-10-CM

## 2024-07-01 DIAGNOSIS — Z94.4 S/P LIVER TRANSPLANT: Chronic | ICD-10-CM

## 2024-07-01 RX ORDER — FUROSEMIDE 40 MG/1
20 TABLET ORAL DAILY
Qty: 45 TABLET | Refills: 3 | Status: SHIPPED | OUTPATIENT
Start: 2024-07-01 | End: 2025-07-01

## 2024-07-01 RX ORDER — SPIRONOLACTONE 50 MG/1
50 TABLET, FILM COATED ORAL DAILY
Qty: 90 TABLET | Refills: 3 | Status: SHIPPED | OUTPATIENT
Start: 2024-07-01 | End: 2025-07-01

## 2024-07-01 NOTE — TELEPHONE ENCOUNTER
Sent message with changes and to get labs on 7/06/24    ----- Message from Jaya Nielsen MD sent at 7/1/2024  8:03 AM CDT -----  Reduce lasix to 20 mg daily. Reduce spironolactone to 100 mg daily. Repeat labs in 1 week.

## 2024-07-09 ENCOUNTER — LAB VISIT (OUTPATIENT)
Dept: LAB | Facility: HOSPITAL | Age: 54
End: 2024-07-09
Attending: INTERNAL MEDICINE
Payer: MEDICARE

## 2024-07-09 DIAGNOSIS — E83.42 HYPOMAGNESEMIA: ICD-10-CM

## 2024-07-09 DIAGNOSIS — Z94.4 LIVER REPLACED BY TRANSPLANT: ICD-10-CM

## 2024-07-09 LAB
ALBUMIN SERPL BCP-MCNC: 2.6 G/DL (ref 3.5–5.2)
ALP SERPL-CCNC: 435 U/L (ref 55–135)
ALT SERPL W/O P-5'-P-CCNC: 47 U/L (ref 10–44)
ANION GAP SERPL CALC-SCNC: 7 MMOL/L (ref 8–16)
AST SERPL-CCNC: 94 U/L (ref 10–40)
BASOPHILS # BLD AUTO: 0.03 K/UL (ref 0–0.2)
BASOPHILS NFR BLD: 0.9 % (ref 0–1.9)
BILIRUB SERPL-MCNC: 4 MG/DL (ref 0.1–1)
BUN SERPL-MCNC: 18 MG/DL (ref 6–20)
CALCIUM SERPL-MCNC: 8.6 MG/DL (ref 8.7–10.5)
CHLORIDE SERPL-SCNC: 107 MMOL/L (ref 95–110)
CO2 SERPL-SCNC: 24 MMOL/L (ref 23–29)
CREAT SERPL-MCNC: 1.3 MG/DL (ref 0.5–1.4)
DIFFERENTIAL METHOD BLD: ABNORMAL
EOSINOPHIL # BLD AUTO: 0.3 K/UL (ref 0–0.5)
EOSINOPHIL NFR BLD: 7.5 % (ref 0–8)
ERYTHROCYTE [DISTWIDTH] IN BLOOD BY AUTOMATED COUNT: 15.3 % (ref 11.5–14.5)
EST. GFR  (NO RACE VARIABLE): 48.3 ML/MIN/1.73 M^2
GLUCOSE SERPL-MCNC: 91 MG/DL (ref 70–110)
HCT VFR BLD AUTO: 32.3 % (ref 37–48.5)
HGB BLD-MCNC: 10.3 G/DL (ref 12–16)
IMM GRANULOCYTES # BLD AUTO: 0 K/UL (ref 0–0.04)
IMM GRANULOCYTES NFR BLD AUTO: 0 % (ref 0–0.5)
INR PPP: 1.2 (ref 0.8–1.2)
LYMPHOCYTES # BLD AUTO: 0.7 K/UL (ref 1–4.8)
LYMPHOCYTES NFR BLD: 21.1 % (ref 18–48)
MAGNESIUM SERPL-MCNC: 1.4 MG/DL (ref 1.6–2.6)
MCH RBC QN AUTO: 29.5 PG (ref 27–31)
MCHC RBC AUTO-ENTMCNC: 31.9 G/DL (ref 32–36)
MCV RBC AUTO: 93 FL (ref 82–98)
MONOCYTES # BLD AUTO: 0.6 K/UL (ref 0.3–1)
MONOCYTES NFR BLD: 18.7 % (ref 4–15)
NEUTROPHILS # BLD AUTO: 1.7 K/UL (ref 1.8–7.7)
NEUTROPHILS NFR BLD: 51.8 % (ref 38–73)
NRBC BLD-RTO: 0 /100 WBC
PLATELET # BLD AUTO: 59 K/UL (ref 150–450)
PMV BLD AUTO: 13.1 FL (ref 9.2–12.9)
POTASSIUM SERPL-SCNC: 3.5 MMOL/L (ref 3.5–5.1)
PROT SERPL-MCNC: 6.2 G/DL (ref 6–8.4)
PROTHROMBIN TIME: 13.1 SEC (ref 9–12.5)
RBC # BLD AUTO: 3.49 M/UL (ref 4–5.4)
SODIUM SERPL-SCNC: 138 MMOL/L (ref 136–145)
WBC # BLD AUTO: 3.32 K/UL (ref 3.9–12.7)

## 2024-07-09 PROCEDURE — 36415 COLL VENOUS BLD VENIPUNCTURE: CPT | Mod: PO | Performed by: INTERNAL MEDICINE

## 2024-07-09 PROCEDURE — 85025 COMPLETE CBC W/AUTO DIFF WBC: CPT | Performed by: INTERNAL MEDICINE

## 2024-07-09 PROCEDURE — 80053 COMPREHEN METABOLIC PANEL: CPT | Performed by: INTERNAL MEDICINE

## 2024-07-09 PROCEDURE — 85610 PROTHROMBIN TIME: CPT | Performed by: INTERNAL MEDICINE

## 2024-07-09 PROCEDURE — 83735 ASSAY OF MAGNESIUM: CPT | Performed by: INTERNAL MEDICINE

## 2024-07-09 PROCEDURE — 80197 ASSAY OF TACROLIMUS: CPT | Performed by: INTERNAL MEDICINE

## 2024-07-10 ENCOUNTER — TELEPHONE (OUTPATIENT)
Dept: TRANSPLANT | Facility: CLINIC | Age: 54
End: 2024-07-10
Payer: MEDICARE

## 2024-07-10 DIAGNOSIS — C22.0 HEPATOCELLULAR CARCINOMA: ICD-10-CM

## 2024-07-10 DIAGNOSIS — Z94.4 LIVER REPLACED BY TRANSPLANT: Primary | ICD-10-CM

## 2024-07-10 LAB — TACROLIMUS BLD-MCNC: 6 NG/ML (ref 5–15)

## 2024-07-10 NOTE — TELEPHONE ENCOUNTER
----- Message from Jaya Nielsen MD sent at 7/10/2024  9:20 AM CDT -----  Results reviewed. No action.  
Standing/Walking/Toileting

## 2024-07-25 ENCOUNTER — OFFICE VISIT (OUTPATIENT)
Dept: FAMILY MEDICINE | Facility: CLINIC | Age: 54
End: 2024-07-25
Payer: MEDICARE

## 2024-07-25 ENCOUNTER — LAB VISIT (OUTPATIENT)
Dept: LAB | Facility: HOSPITAL | Age: 54
End: 2024-07-25
Attending: INTERNAL MEDICINE
Payer: MEDICARE

## 2024-07-25 VITALS
WEIGHT: 120.81 LBS | SYSTOLIC BLOOD PRESSURE: 100 MMHG | DIASTOLIC BLOOD PRESSURE: 56 MMHG | OXYGEN SATURATION: 97 % | HEART RATE: 62 BPM | HEIGHT: 59 IN | BODY MASS INDEX: 24.36 KG/M2

## 2024-07-25 DIAGNOSIS — R42 DIZZINESS: Primary | ICD-10-CM

## 2024-07-25 DIAGNOSIS — N18.31 STAGE 3A CHRONIC KIDNEY DISEASE: Chronic | ICD-10-CM

## 2024-07-25 DIAGNOSIS — R41.0 CONFUSION: ICD-10-CM

## 2024-07-25 DIAGNOSIS — D61.818 PANCYTOPENIA: Chronic | ICD-10-CM

## 2024-07-25 DIAGNOSIS — R42 DIZZINESS: ICD-10-CM

## 2024-07-25 DIAGNOSIS — T86.49 LIVER FIBROSIS, TRANSPLANTED LIVER: ICD-10-CM

## 2024-07-25 DIAGNOSIS — K74.00 LIVER FIBROSIS, TRANSPLANTED LIVER: ICD-10-CM

## 2024-07-25 DIAGNOSIS — R53.81 DEBILITY: ICD-10-CM

## 2024-07-25 PROBLEM — R78.81 BACTEREMIA: Status: RESOLVED | Noted: 2023-12-22 | Resolved: 2024-07-25

## 2024-07-25 LAB
ALBUMIN SERPL BCP-MCNC: 2.8 G/DL (ref 3.5–5.2)
ALP SERPL-CCNC: 474 U/L (ref 55–135)
ALT SERPL W/O P-5'-P-CCNC: 46 U/L (ref 10–44)
ANION GAP SERPL CALC-SCNC: 12 MMOL/L (ref 8–16)
AST SERPL-CCNC: 78 U/L (ref 10–40)
BASOPHILS # BLD AUTO: 0.05 K/UL (ref 0–0.2)
BASOPHILS NFR BLD: 1 % (ref 0–1.9)
BILIRUB SERPL-MCNC: 3.5 MG/DL (ref 0.1–1)
BUN SERPL-MCNC: 44 MG/DL (ref 6–20)
CALCIUM SERPL-MCNC: 9.6 MG/DL (ref 8.7–10.5)
CHLORIDE SERPL-SCNC: 94 MMOL/L (ref 95–110)
CO2 SERPL-SCNC: 19 MMOL/L (ref 23–29)
CREAT SERPL-MCNC: 5.2 MG/DL (ref 0.5–1.4)
DIFFERENTIAL METHOD BLD: ABNORMAL
EOSINOPHIL # BLD AUTO: 0.2 K/UL (ref 0–0.5)
EOSINOPHIL NFR BLD: 3.1 % (ref 0–8)
ERYTHROCYTE [DISTWIDTH] IN BLOOD BY AUTOMATED COUNT: 15.4 % (ref 11.5–14.5)
EST. GFR  (NO RACE VARIABLE): 9.1 ML/MIN/1.73 M^2
GLUCOSE SERPL-MCNC: 114 MG/DL (ref 70–110)
HCT VFR BLD AUTO: 31.1 % (ref 37–48.5)
HGB BLD-MCNC: 10.6 G/DL (ref 12–16)
IMM GRANULOCYTES # BLD AUTO: 0.01 K/UL (ref 0–0.04)
IMM GRANULOCYTES NFR BLD AUTO: 0.2 % (ref 0–0.5)
LYMPHOCYTES # BLD AUTO: 1.1 K/UL (ref 1–4.8)
LYMPHOCYTES NFR BLD: 22.2 % (ref 18–48)
MCH RBC QN AUTO: 30.4 PG (ref 27–31)
MCHC RBC AUTO-ENTMCNC: 34.1 G/DL (ref 32–36)
MCV RBC AUTO: 89 FL (ref 82–98)
MONOCYTES # BLD AUTO: 0.6 K/UL (ref 0.3–1)
MONOCYTES NFR BLD: 11.4 % (ref 4–15)
NEUTROPHILS # BLD AUTO: 3 K/UL (ref 1.8–7.7)
NEUTROPHILS NFR BLD: 62.1 % (ref 38–73)
NRBC BLD-RTO: 0 /100 WBC
PLATELET # BLD AUTO: 77 K/UL (ref 150–450)
PMV BLD AUTO: ABNORMAL FL (ref 9.2–12.9)
POTASSIUM SERPL-SCNC: 5.8 MMOL/L (ref 3.5–5.1)
PROT SERPL-MCNC: 6.9 G/DL (ref 6–8.4)
RBC # BLD AUTO: 3.49 M/UL (ref 4–5.4)
SODIUM SERPL-SCNC: 125 MMOL/L (ref 136–145)
WBC # BLD AUTO: 4.83 K/UL (ref 3.9–12.7)

## 2024-07-25 PROCEDURE — 1160F RVW MEDS BY RX/DR IN RCRD: CPT | Mod: CPTII,S$GLB,, | Performed by: INTERNAL MEDICINE

## 2024-07-25 PROCEDURE — 80053 COMPREHEN METABOLIC PANEL: CPT | Performed by: INTERNAL MEDICINE

## 2024-07-25 PROCEDURE — 3074F SYST BP LT 130 MM HG: CPT | Mod: CPTII,S$GLB,, | Performed by: INTERNAL MEDICINE

## 2024-07-25 PROCEDURE — 3078F DIAST BP <80 MM HG: CPT | Mod: CPTII,S$GLB,, | Performed by: INTERNAL MEDICINE

## 2024-07-25 PROCEDURE — 1159F MED LIST DOCD IN RCRD: CPT | Mod: CPTII,S$GLB,, | Performed by: INTERNAL MEDICINE

## 2024-07-25 PROCEDURE — 4010F ACE/ARB THERAPY RXD/TAKEN: CPT | Mod: CPTII,S$GLB,, | Performed by: INTERNAL MEDICINE

## 2024-07-25 PROCEDURE — 82140 ASSAY OF AMMONIA: CPT | Performed by: INTERNAL MEDICINE

## 2024-07-25 PROCEDURE — 85025 COMPLETE CBC W/AUTO DIFF WBC: CPT | Performed by: INTERNAL MEDICINE

## 2024-07-25 PROCEDURE — 99999 PR PBB SHADOW E&M-EST. PATIENT-LVL IV: CPT | Mod: PBBFAC,,, | Performed by: INTERNAL MEDICINE

## 2024-07-25 PROCEDURE — 3008F BODY MASS INDEX DOCD: CPT | Mod: CPTII,S$GLB,, | Performed by: INTERNAL MEDICINE

## 2024-07-25 PROCEDURE — G2211 COMPLEX E/M VISIT ADD ON: HCPCS | Mod: S$GLB,,, | Performed by: INTERNAL MEDICINE

## 2024-07-25 PROCEDURE — 36415 COLL VENOUS BLD VENIPUNCTURE: CPT | Mod: PO | Performed by: INTERNAL MEDICINE

## 2024-07-25 PROCEDURE — 99214 OFFICE O/P EST MOD 30 MIN: CPT | Mod: S$GLB,,, | Performed by: INTERNAL MEDICINE

## 2024-07-25 NOTE — PROGRESS NOTES
"[facility]  [facility]   [address] [facility] [address]       Patient ID: Elda Hernandez     Chief Complaint:   Chief Complaint   Patient presents with    Follow-up     6 month           HPI:  Patient and her  have noticed that she has been a bit more dizzy and unsteady on her feet for the past few weeks.  Her blood pressure today is lot lower than I would like and I think it is due to overmedication with a combination of ramipril and low most likely intravascular dehydration due to the Lasix and Aldactone that she takes.  Of note, both her Lasix and Aldactone were doubled one-month ago.  I want her to not take those 3 medicines tomorrow.  We may restart in the next day but I want to get some labs today.  She has also noticed that she has been bit more confused over the past few days so I want to check an ammonia level since she is status post liver transplant.  We may develop intermittent dosing of these medicines but for now I want her to keep her on her feet and not fall and have her blood pressure increase and that would likely coincides with her holding onto a bit more fluid.    Review of Systems       Dizziness  Confusion     Objective:      Physical Exam   Physical Exam       No edema or ascites     Vitals:   Vitals:    07/25/24 1557   BP: (!) 100/56   Pulse: 62   SpO2: 97%   Weight: 54.8 kg (120 lb 13 oz)   Height: 4' 11" (1.499 m)        Assessment:           Plan:       Elda Hernandez  was seen today for follow-up and may need lab work.    Diagnoses and all orders for this visit:    Elda Doran was seen today for follow-up.    Diagnoses and all orders for this visit:    Dizziness  -     CBC Auto Differential; Future  -     Comprehensive Metabolic Panel; Future  Likely due to blood pressure overmedication and intravascular volume depletion.  We will get labs today but I also want her to not take the ramipril, Lasix, Aldactone for at least tomorrow.    Confusion  -     AMMONIA; " Future  Risk for hepatic encephalopathy     Stage 3a chronic kidney disease  Monitor Labs    Pancytopenia  Stable    Liver fibrosis, transplanted liver  Chronic rejection     Debility  Ambulates in wheelchair     Visit today included increased complexity associated with the care of the episodic problem Dizziness Confusion Liver transplant addressed and managing the longitudinal care of the patient due to the serious and/or complex managed problem(s) .           David Lorenzo MD

## 2024-07-25 NOTE — Clinical Note
I think her Blood Pressure is too low, so I'm having her hold Ramipril, Lasix and Aldactone for a day.

## 2024-07-26 ENCOUNTER — PATIENT MESSAGE (OUTPATIENT)
Dept: FAMILY MEDICINE | Facility: CLINIC | Age: 54
End: 2024-07-26
Payer: MEDICARE

## 2024-07-26 ENCOUNTER — PATIENT MESSAGE (OUTPATIENT)
Dept: TRANSPLANT | Facility: CLINIC | Age: 54
End: 2024-07-26
Payer: MEDICARE

## 2024-07-26 LAB — AMMONIA PLAS-SCNC: 200 UMOL/L (ref 10–50)

## 2024-07-27 ENCOUNTER — HOSPITAL ENCOUNTER (EMERGENCY)
Facility: HOSPITAL | Age: 54
Discharge: ANOTHER HOSPITAL | End: 2024-07-28
Attending: EMERGENCY MEDICINE
Payer: MEDICARE

## 2024-07-27 DIAGNOSIS — E87.5 HYPERKALEMIA: ICD-10-CM

## 2024-07-27 DIAGNOSIS — E86.0 DEHYDRATION: Primary | ICD-10-CM

## 2024-07-27 DIAGNOSIS — R41.0 CONFUSION: ICD-10-CM

## 2024-07-27 LAB
ALBUMIN SERPL BCP-MCNC: 3.1 G/DL (ref 3.5–5.2)
ALP SERPL-CCNC: 422 U/L (ref 55–135)
ALT SERPL W/O P-5'-P-CCNC: 39 U/L (ref 10–44)
AMMONIA PLAS-SCNC: 49 UMOL/L (ref 10–50)
ANION GAP SERPL CALC-SCNC: 9 MMOL/L (ref 8–16)
AST SERPL-CCNC: 78 U/L (ref 10–40)
BASOPHILS # BLD AUTO: 0.02 K/UL (ref 0–0.2)
BASOPHILS NFR BLD: 0.5 % (ref 0–1.9)
BILIRUB SERPL-MCNC: 4.2 MG/DL (ref 0.1–1)
BUN SERPL-MCNC: 51 MG/DL (ref 6–20)
CALCIUM SERPL-MCNC: 9.3 MG/DL (ref 8.7–10.5)
CHLORIDE SERPL-SCNC: 92 MMOL/L (ref 95–110)
CO2 SERPL-SCNC: 18 MMOL/L (ref 23–29)
CREAT SERPL-MCNC: 4 MG/DL (ref 0.5–1.4)
DIFFERENTIAL METHOD BLD: ABNORMAL
EOSINOPHIL # BLD AUTO: 0.2 K/UL (ref 0–0.5)
EOSINOPHIL NFR BLD: 4.9 % (ref 0–8)
ERYTHROCYTE [DISTWIDTH] IN BLOOD BY AUTOMATED COUNT: 14.9 % (ref 11.5–14.5)
EST. GFR  (NO RACE VARIABLE): 12.5 ML/MIN/1.73 M^2
GLUCOSE SERPL-MCNC: 110 MG/DL (ref 70–110)
HCT VFR BLD AUTO: 29.9 % (ref 37–48.5)
HGB BLD-MCNC: 10.2 G/DL (ref 12–16)
IMM GRANULOCYTES # BLD AUTO: 0.01 K/UL (ref 0–0.04)
IMM GRANULOCYTES NFR BLD AUTO: 0.3 % (ref 0–0.5)
INR PPP: 1.2 (ref 0.8–1.2)
LYMPHOCYTES # BLD AUTO: 0.8 K/UL (ref 1–4.8)
LYMPHOCYTES NFR BLD: 20.5 % (ref 18–48)
MCH RBC QN AUTO: 29.4 PG (ref 27–31)
MCHC RBC AUTO-ENTMCNC: 34.1 G/DL (ref 32–36)
MCV RBC AUTO: 86 FL (ref 82–98)
MONOCYTES # BLD AUTO: 0.4 K/UL (ref 0.3–1)
MONOCYTES NFR BLD: 11.6 % (ref 4–15)
NEUTROPHILS # BLD AUTO: 2.3 K/UL (ref 1.8–7.7)
NEUTROPHILS NFR BLD: 62.2 % (ref 38–73)
NRBC BLD-RTO: 0 /100 WBC
PLATELET # BLD AUTO: 68 K/UL (ref 150–450)
PMV BLD AUTO: 11.5 FL (ref 9.2–12.9)
POTASSIUM SERPL-SCNC: 5.6 MMOL/L (ref 3.5–5.1)
PROT SERPL-MCNC: 6.7 G/DL (ref 6–8.4)
PROTHROMBIN TIME: 12.8 SEC (ref 9–12.5)
RBC # BLD AUTO: 3.47 M/UL (ref 4–5.4)
SODIUM SERPL-SCNC: 119 MMOL/L (ref 136–145)
TSH SERPL DL<=0.005 MIU/L-ACNC: 1.41 UIU/ML (ref 0.34–5.6)
WBC # BLD AUTO: 3.71 K/UL (ref 3.9–12.7)

## 2024-07-27 PROCEDURE — 96361 HYDRATE IV INFUSION ADD-ON: CPT

## 2024-07-27 PROCEDURE — 96374 THER/PROPH/DIAG INJ IV PUSH: CPT

## 2024-07-27 PROCEDURE — 85610 PROTHROMBIN TIME: CPT | Performed by: EMERGENCY MEDICINE

## 2024-07-27 PROCEDURE — 80053 COMPREHEN METABOLIC PANEL: CPT | Performed by: EMERGENCY MEDICINE

## 2024-07-27 PROCEDURE — U0002 COVID-19 LAB TEST NON-CDC: HCPCS | Performed by: EMERGENCY MEDICINE

## 2024-07-27 PROCEDURE — 93005 ELECTROCARDIOGRAM TRACING: CPT | Performed by: INTERNAL MEDICINE

## 2024-07-27 PROCEDURE — 84443 ASSAY THYROID STIM HORMONE: CPT | Performed by: EMERGENCY MEDICINE

## 2024-07-27 PROCEDURE — 99284 EMERGENCY DEPT VISIT MOD MDM: CPT | Mod: 25

## 2024-07-27 PROCEDURE — 36415 COLL VENOUS BLD VENIPUNCTURE: CPT | Performed by: EMERGENCY MEDICINE

## 2024-07-27 PROCEDURE — 25000003 PHARM REV CODE 250: Performed by: EMERGENCY MEDICINE

## 2024-07-27 PROCEDURE — 85025 COMPLETE CBC W/AUTO DIFF WBC: CPT | Performed by: EMERGENCY MEDICINE

## 2024-07-27 PROCEDURE — 93010 ELECTROCARDIOGRAM REPORT: CPT | Mod: ,,, | Performed by: INTERNAL MEDICINE

## 2024-07-27 PROCEDURE — 82140 ASSAY OF AMMONIA: CPT | Performed by: EMERGENCY MEDICINE

## 2024-07-27 RX ADMIN — SODIUM CHLORIDE 1000 ML: 9 INJECTION, SOLUTION INTRAVENOUS at 11:07

## 2024-07-27 RX ADMIN — SODIUM ZIRCONIUM CYCLOSILICATE 10 G: 10 POWDER, FOR SUSPENSION ORAL at 11:07

## 2024-07-28 ENCOUNTER — HOSPITAL ENCOUNTER (INPATIENT)
Facility: HOSPITAL | Age: 54
LOS: 3 days | Discharge: HOME | DRG: 644 | End: 2024-07-31
Attending: HOSPITALIST | Admitting: HOSPITALIST
Payer: MEDICARE

## 2024-07-28 VITALS
BODY MASS INDEX: 24.24 KG/M2 | TEMPERATURE: 98 F | WEIGHT: 120 LBS | HEART RATE: 61 BPM | SYSTOLIC BLOOD PRESSURE: 159 MMHG | DIASTOLIC BLOOD PRESSURE: 71 MMHG | OXYGEN SATURATION: 100 % | RESPIRATION RATE: 17 BRPM

## 2024-07-28 DIAGNOSIS — N18.9 ACUTE KIDNEY INJURY SUPERIMPOSED ON CHRONIC KIDNEY DISEASE: ICD-10-CM

## 2024-07-28 DIAGNOSIS — R18.8 OTHER ASCITES: ICD-10-CM

## 2024-07-28 DIAGNOSIS — N17.9 ACUTE KIDNEY INJURY SUPERIMPOSED ON CHRONIC KIDNEY DISEASE: ICD-10-CM

## 2024-07-28 DIAGNOSIS — N18.31 STAGE 3A CHRONIC KIDNEY DISEASE: Primary | Chronic | ICD-10-CM

## 2024-07-28 DIAGNOSIS — E87.1 HYPONATREMIA: ICD-10-CM

## 2024-07-28 DIAGNOSIS — Z94.4 S/P LIVER TRANSPLANT: Chronic | ICD-10-CM

## 2024-07-28 PROBLEM — K72.90 DECOMPENSATED LIVER DISEASE: Chronic | Status: ACTIVE | Noted: 2021-11-09

## 2024-07-28 PROBLEM — K74.69 DECOMPENSATED LIVER DISEASE: Chronic | Status: ACTIVE | Noted: 2021-11-09

## 2024-07-28 PROBLEM — T86.49 LIVER FIBROSIS, TRANSPLANTED LIVER: Chronic | Status: ACTIVE | Noted: 2018-10-02

## 2024-07-28 PROBLEM — K74.60 DECOMPENSATED LIVER DISEASE: Chronic | Status: ACTIVE | Noted: 2021-11-09

## 2024-07-28 PROBLEM — R19.8 PERFORATED ABDOMINAL VISCUS: Status: RESOLVED | Noted: 2020-09-04 | Resolved: 2024-07-28

## 2024-07-28 PROBLEM — E87.6 HYPOKALEMIA: Status: RESOLVED | Noted: 2020-09-03 | Resolved: 2024-07-28

## 2024-07-28 PROBLEM — K74.00 LIVER FIBROSIS, TRANSPLANTED LIVER: Chronic | Status: ACTIVE | Noted: 2018-10-02

## 2024-07-28 PROBLEM — G43.719 INTRACTABLE CHRONIC MIGRAINE WITHOUT AURA AND WITHOUT STATUS MIGRAINOSUS: Chronic | Status: ACTIVE | Noted: 2023-11-14

## 2024-07-28 PROBLEM — K59.09 CHRONIC CONSTIPATION: Chronic | Status: ACTIVE | Noted: 2018-10-01

## 2024-07-28 PROBLEM — I34.1 MVP (MITRAL VALVE PROLAPSE): Chronic | Status: ACTIVE | Noted: 2021-11-09

## 2024-07-28 PROBLEM — I11.9 LVH (LEFT VENTRICULAR HYPERTROPHY) DUE TO HYPERTENSIVE DISEASE, WITHOUT HEART FAILURE: Chronic | Status: ACTIVE | Noted: 2023-09-27

## 2024-07-28 PROBLEM — K76.82 HEPATIC ENCEPHALOPATHY: Chronic | Status: ACTIVE | Noted: 2023-03-01

## 2024-07-28 LAB
ANION GAP SERPL CALC-SCNC: 9 MMOL/L (ref 8–16)
BILIRUB UR QL STRIP: NEGATIVE
BUN SERPL-MCNC: 46 MG/DL (ref 6–20)
CALCIUM SERPL-MCNC: 8.8 MG/DL (ref 8.7–10.5)
CHLORIDE SERPL-SCNC: 94 MMOL/L (ref 95–110)
CLARITY UR: CLEAR
CO2 SERPL-SCNC: 19 MMOL/L (ref 23–29)
COLOR UR: YELLOW
CREAT SERPL-MCNC: 3.5 MG/DL (ref 0.5–1.4)
EST. GFR  (NO RACE VARIABLE): 14.7 ML/MIN/1.73 M^2
GLUCOSE SERPL-MCNC: 97 MG/DL (ref 70–110)
GLUCOSE UR QL STRIP: NEGATIVE
HGB UR QL STRIP: NEGATIVE
KETONES UR QL STRIP: NEGATIVE
LEUKOCYTE ESTERASE UR QL STRIP: NEGATIVE
NITRITE UR QL STRIP: NEGATIVE
OSMOLALITY SERPL: 273 MOSM/KG (ref 275–295)
OSMOLALITY UR: 177 MOSM/KG (ref 50–1200)
PH UR STRIP: 7 [PH] (ref 5–8)
POTASSIUM SERPL-SCNC: 4.7 MMOL/L (ref 3.5–5.1)
PROT UR QL STRIP: NEGATIVE
SARS-COV-2 RDRP RESP QL NAA+PROBE: NEGATIVE
SODIUM SERPL-SCNC: 122 MMOL/L (ref 136–145)
SODIUM UR-SCNC: 27 MMOL/L (ref 20–250)
SP GR UR STRIP: 1 (ref 1–1.03)
URN SPEC COLLECT METH UR: NORMAL
UROBILINOGEN UR STRIP-ACNC: NEGATIVE EU/DL

## 2024-07-28 PROCEDURE — 25000003 PHARM REV CODE 250: Performed by: EMERGENCY MEDICINE

## 2024-07-28 PROCEDURE — 80197 ASSAY OF TACROLIMUS: CPT | Performed by: HOSPITALIST

## 2024-07-28 PROCEDURE — 36415 COLL VENOUS BLD VENIPUNCTURE: CPT | Performed by: HOSPITALIST

## 2024-07-28 PROCEDURE — 80048 BASIC METABOLIC PNL TOTAL CA: CPT | Performed by: EMERGENCY MEDICINE

## 2024-07-28 PROCEDURE — 84300 ASSAY OF URINE SODIUM: CPT | Performed by: EMERGENCY MEDICINE

## 2024-07-28 PROCEDURE — 81003 URINALYSIS AUTO W/O SCOPE: CPT | Performed by: EMERGENCY MEDICINE

## 2024-07-28 PROCEDURE — 96361 HYDRATE IV INFUSION ADD-ON: CPT

## 2024-07-28 PROCEDURE — 25000003 PHARM REV CODE 250: Performed by: HOSPITALIST

## 2024-07-28 PROCEDURE — 63600175 PHARM REV CODE 636 W HCPCS: Performed by: EMERGENCY MEDICINE

## 2024-07-28 PROCEDURE — 83935 ASSAY OF URINE OSMOLALITY: CPT | Performed by: EMERGENCY MEDICINE

## 2024-07-28 PROCEDURE — 20600001 HC STEP DOWN PRIVATE ROOM

## 2024-07-28 PROCEDURE — 83930 ASSAY OF BLOOD OSMOLALITY: CPT | Performed by: EMERGENCY MEDICINE

## 2024-07-28 RX ORDER — LEVOTHYROXINE SODIUM 75 UG/1
75 TABLET ORAL
Status: DISCONTINUED | OUTPATIENT
Start: 2024-07-29 | End: 2024-07-31 | Stop reason: HOSPADM

## 2024-07-28 RX ORDER — PROCHLORPERAZINE MALEATE 5 MG
10 TABLET ORAL EVERY 6 HOURS PRN
Status: DISCONTINUED | OUTPATIENT
Start: 2024-07-28 | End: 2024-07-31 | Stop reason: HOSPADM

## 2024-07-28 RX ORDER — OXYCODONE HYDROCHLORIDE 5 MG/1
5 TABLET ORAL EVERY 6 HOURS PRN
Status: DISCONTINUED | OUTPATIENT
Start: 2024-07-28 | End: 2024-07-29

## 2024-07-28 RX ORDER — LANOLIN ALCOHOL/MO/W.PET/CERES
400 CREAM (GRAM) TOPICAL 2 TIMES DAILY
Status: DISCONTINUED | OUTPATIENT
Start: 2024-07-28 | End: 2024-07-31 | Stop reason: HOSPADM

## 2024-07-28 RX ORDER — CALCIUM CARBONATE 200(500)MG
1500 TABLET,CHEWABLE ORAL DAILY PRN
Status: DISCONTINUED | OUTPATIENT
Start: 2024-07-28 | End: 2024-07-31 | Stop reason: HOSPADM

## 2024-07-28 RX ORDER — ONDANSETRON HYDROCHLORIDE 2 MG/ML
4 INJECTION, SOLUTION INTRAVENOUS EVERY 6 HOURS PRN
Status: DISCONTINUED | OUTPATIENT
Start: 2024-07-28 | End: 2024-07-31 | Stop reason: HOSPADM

## 2024-07-28 RX ORDER — SODIUM CHLORIDE 9 MG/ML
INJECTION, SOLUTION INTRAVENOUS CONTINUOUS
Status: DISCONTINUED | OUTPATIENT
Start: 2024-07-28 | End: 2024-07-29

## 2024-07-28 RX ORDER — TACROLIMUS 0.5 MG/1
0.5 CAPSULE ORAL 2 TIMES DAILY
Status: DISCONTINUED | OUTPATIENT
Start: 2024-07-28 | End: 2024-07-28

## 2024-07-28 RX ORDER — LACTULOSE 10 G/15ML
15 SOLUTION ORAL 3 TIMES DAILY
Status: DISCONTINUED | OUTPATIENT
Start: 2024-07-28 | End: 2024-07-31 | Stop reason: HOSPADM

## 2024-07-28 RX ORDER — FENTANYL CITRATE 50 UG/ML
25 INJECTION, SOLUTION INTRAMUSCULAR; INTRAVENOUS
Status: COMPLETED | OUTPATIENT
Start: 2024-07-28 | End: 2024-07-28

## 2024-07-28 RX ORDER — VENLAFAXINE HYDROCHLORIDE 75 MG/1
150 CAPSULE, EXTENDED RELEASE ORAL DAILY
Status: DISCONTINUED | OUTPATIENT
Start: 2024-07-29 | End: 2024-07-31 | Stop reason: HOSPADM

## 2024-07-28 RX ORDER — ACETAMINOPHEN 500 MG
500 TABLET ORAL EVERY 6 HOURS PRN
Status: DISCONTINUED | OUTPATIENT
Start: 2024-07-28 | End: 2024-07-31 | Stop reason: HOSPADM

## 2024-07-28 RX ORDER — SODIUM CHLORIDE 9 MG/ML
1000 INJECTION, SOLUTION INTRAVENOUS
Status: COMPLETED | OUTPATIENT
Start: 2024-07-28 | End: 2024-07-28

## 2024-07-28 RX ADMIN — FENTANYL CITRATE 25 MCG: 50 INJECTION, SOLUTION INTRAMUSCULAR; INTRAVENOUS at 01:07

## 2024-07-28 RX ADMIN — LACTULOSE 15 G: 20 SOLUTION ORAL at 08:07

## 2024-07-28 RX ADMIN — SODIUM CHLORIDE 1000 ML: 9 INJECTION, SOLUTION INTRAVENOUS at 06:07

## 2024-07-28 RX ADMIN — SODIUM CHLORIDE: 9 INJECTION, SOLUTION INTRAVENOUS at 11:07

## 2024-07-28 RX ADMIN — OXYCODONE 5 MG: 5 TABLET ORAL at 11:07

## 2024-07-28 RX ADMIN — LACTULOSE 15 G: 20 SOLUTION ORAL at 02:07

## 2024-07-28 RX ADMIN — Medication 400 MG: at 08:07

## 2024-07-28 NOTE — HPI
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 She lives in [place], [place]. She is . Her primary care physician is Dr. David Lorenzo. Her hepatologist is Dr. Jaya Nielsen.   She saw Dr. Nielsen in clinic on 6/17/2024. Her furosemide dose was increased from 20 mg to 40 mg and her spironolactone dose was increased from 50 mg to 100 mg due to ascites, which was difficult to treat with paracentesis due to being unable to find a large enough pocket to drain.    She had labs done on 7/25/2024 that showed acute on chronic hyponatremia (sodium 125 mmol/L), hyperkalemia (potassium 5.8 mmol/L), acute kidney injury (BUN 44 mg/dL and creatinine 5.2 mg/dL, from 18 and 1.3 on 7/9/2024), elevated ammonia (200 umol/L).   She was advised to go to the hospital. She presented to [facility] Emergency Department on 7/27/2024. She had gradually worsening confusion, generalized weakness, and decreased oral intake for the last couple of weeks. She had dizziness for the past week. She left without treatment.    She went to [facility]  Hospital Emergency Department. She had intermittent confusion. Labs showed worsened sodium (119), slightly improved potassium (5.6), worsened BUN (51), improved creatinine (4.0), and normal ammonia (49). Due to her history of liver transplant, her case was discussed with Hepatology at [facility]. She was given normal saline and sodium zirconium cyclosilicate (Lokelma). Sodium improved to 122, hyperkalemia resolved, BUN and creatinine improved. She was transferred to [facility] on 7/28/2024 and admitted to Hospital Medicine Team L.

## 2024-07-28 NOTE — ASSESSMENT & PLAN NOTE
Chronic rejection of liver transplant   Long-term use of immunosuppressant medication   Check tacrolimus level before resuming home tacrolimus. Transferred here for Hepatology management.

## 2024-07-28 NOTE — PLAN OF CARE
[facility] - Stepdown Flex ([place]-)  Initial Discharge Assessment       Primary Care Provider: David Lorenzo MD    Admission Diagnosis: Hyponatremia    Admission Date: 7/28/2024  Expected Discharge Date:     Transition of Care Barriers: None    Payor: HU[employer] MANAGED MEDICARE / Plan: HU[employer]NA MEDICARE PPO / Product Type: Medicare Advantage /     Extended Emergency Contact Information  Primary Emergency Contact: Kody Hernandez  Address: [address] [place] [address] [place] [address] [place]  Work Phone: [phone]  Mobile Phone: [phone]  Relation: Spouse    Discharge Plan A: Home with family  Discharge Plan B: [id number] - [place], [place] - [address] AT [address] OF [facility] [address]  [address]  [place] [address]  Phone: [phone] Fax: [phone]      Initial Assessment (most recent)       Adult Discharge Assessment - 07/28/24 1012          Discharge Assessment    Assessment Type Discharge Planning Assessment     Confirmed/corrected address, phone number and insurance Yes     Confirmed Demographics Correct on Facesheet     Source of Information patient     When was your last doctors appointment? 07/15/24     Communicated MARILEE with patient/caregiver Yes     People in Home spouse     Do you expect to return to your current living situation? Yes     Do you have help at home or someone to help you manage your care at home? Yes     Who are your caregiver(s) and their phone number(s)?      Prior to hospitilization cognitive status: Alert/Oriented     Current cognitive status: Alert/Oriented     Home Layout Able to live on 1st floor     Do you take prescription medications? Yes     Do you have prescription coverage? Yes     Do you have any problems affording any of your prescribed medications? No     Is the patient taking medications as prescribed? yes     Who is going to help you get home at discharge?      How do you get to  doctors appointments? family or friend will provide     Are you on dialysis? No     Do you take coumadin? No     Discharge Plan A Home with family     Discharge Plan B Home Health     Discharge Plan discussed with: Patient     Transition of Care Barriers None     SDOH --   no       Physical Activity    On average, how many days per week do you engage in moderate to strenuous exercise (like a brisk walk)? 0 days     On average, how many minutes do you engage in exercise at this level? 0 min        Financial Resource Strain    How hard is it for you to pay for the very basics like food, housing, medical care, and heating? Not hard at all        Housing Stability    In the last 12 months, was there a time when you were not able to pay the mortgage or rent on time? No     At any time in the past 12 months, were you homeless or living in a shelter (including now)? No        Transportation Needs    Has the lack of transportation kept you from medical appointments, meetings, work or from getting things needed for daily living? No        Food Insecurity    Within the past 12 months, you worried that your food would run out before you got the money to buy more. Never true     Within the past 12 months, the food you bought just didn't last and you didn't have money to get more. Never true        Stress    Do you feel stress - tense, restless, nervous, or anxious, or unable to sleep at night because your mind is troubled all the time - these days? Not at all        Social Isolation    How often do you feel lonely or isolated from those around you?  Never        Alcohol Use    Q1: How often do you have a drink containing alcohol? Never     Q2: How many drinks containing alcohol do you have on a typical day when you are drinking? Patient does not drink     Q3: How often do you have six or more drinks on one occasion? Never        [employer]    In the past 12 months has the electric, gas, oil, or water company threatened to shut  off services in your home? No        Health Literacy    How often do you need to have someone help you when you read instructions, pamphlets, or other written material from your doctor or pharmacy? Never        Transportation Needs    In the past 12 months, has lack of transportation kept you from medical appointments or from getting medications? No     In the past 12 months, has lack of transportation kept you from meetings, work, or from getting things needed for daily living? No        Social Connections    In a typical week, how many times do you talk on the phone with family, friends, or neighbors? Never     How often do you get together with friends or relatives? Never     How often do you attend Mormon or Zoroastrianism services? Never     Do you belong to any clubs or organizations such as Mormon groups, unions, fraternal or athletic groups, or school groups? No     How often do you attend meetings of the clubs or organizations you belong to? Never     Are you , , , , never , or living with a partner?                       The CM met with the patient at bedside to complete the DPA. The CM placed name and contact information on the blackboard in the patient's room.  Use preferred pharmacy / bedside delivery for any necessary  medications at the time of discharge.The patient is independent with all ADLs. The patient is not on Dialysis or Coumadin. The patient's  will provide assistance to the patient upon discharge. The patient's  will provide transportation upon discharge .  The CM will continue to follow for course of hospitalization.

## 2024-07-28 NOTE — SUBJECTIVE & OBJECTIVE
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    Arnold-Chiari malformation     Bacteremia 12/22/2023    Depression     Esophageal stricture     Essential tremor     Hypertension     Left bundle branch block     Liver fibrosis, transplanted liver 10/02/2018    Suggested on fibroscan 10/2/18    Migraine without aura     MVP (mitral valve prolapse)     Non-rheumatic mitral regurgitation 10/01/2018    Non-rheumatic tricuspid valve insufficiency 10/01/2018    Osteoporosis     Perforated abdominal viscus 09/04/2020    Recurrent urinary tract infection     Seizures     Shingles 2007    SIADH (syndrome of inappropriate ADH production)     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    Tricuspid valve prolapse     Urolithiasis     Von Gierke disease     s/p liver transplant       Past Surgical History:   Procedure Laterality Date    APPENDECTOMY  6/22/2007    APPLICATION OF WOUND VACUUM-ASSISTED CLOSURE DEVICE N/A 9/18/2020    Procedure: APPLICATION, WOUND VAC;  Surgeon: Zain Decker MD;  Location: [facility] OR [address] [place] [address];  Service: General;  Laterality: N/A;    COLONOSCOPY  5/13/2008    internal hemorrhoids    CRANIOTOMY      ESOPHAGOGASTRODUODENOSCOPY N/A 9/3/2020    Procedure: EGD (ESOPHAGOGASTRODUODENOSCOPY);  Surgeon: Tyrel Vergara MD;  Location: [facility];  Service: Endoscopy;  Laterality: N/A;    ESOPHAGOGASTRODUODENOSCOPY N/A 12/22/2023    Procedure: EGD (ESOPHAGOGASTRODUODENOSCOPY);  Surgeon: Jhony James MD;  Location: [address]);  Service: Endoscopy;  Laterality: N/A;    EXPLORATORY LAPAROTOMY  2020    due to perforated stomach    LAMINECTOMY  3/2001    LIVER TRANSPLANT  9/23/2002    OSSICULAR RECONSTRUCTION  10/4/1995    RIGHT REPLACEMENT PROSTHESIS for cholesteatoma    THYMECTOMY  5/2/2007    TONSILLECTOMY, ADENOIDECTOMY  1/21/2004    TOTAL ABDOMINAL HYSTERECTOMY  3/31/1994       Review of patient's allergies indicates:   Allergen Reactions    Codeine Itching      Other reaction(s): Itching    Lipitor [atorvastatin] Other (See Comments)     Other reaction(s): Muscle pain  Muscle cranmps    Morphine Itching     Other reaction(s): nausea and vomiting     Zoloft [sertraline] Other (See Comments)     Tremors/muscle spasms       Current Facility-Administered Medications on File Prior to Encounter   Medication    [COMPLETED] 0.9%  NaCl infusion    [COMPLETED] fentaNYL 50 mcg/mL injection 25 mcg    [COMPLETED] sodium chloride 0.9% bolus 1,000 mL 1,000 mL    [COMPLETED] sodium zirconium cyclosilicate packet 10 g    [DISCONTINUED] sodium zirconium cyclosilicate packet 10 g     Current Outpatient Medications on File Prior to Encounter   Medication Sig    calcium carbonate (TUMS) 200 mg calcium (500 mg) chewable tablet Take 1 tablet (500 mg total) by mouth 2 (two) times daily as needed. (Patient taking differently: Take 1,500 mg by mouth daily as needed for Heartburn.)    calcium phosphate trib/vit D3 (CALTRATE GUMMY BITES ORAL) Take by mouth.    clonazePAM (KLONOPIN) 0.5 MG tablet Take 1 tablet (0.5 mg total) by mouth 2 (two) times daily.    DENAVIR 1 % cream RICHAR EXT AA Q 2 H FOR 4 DAYS    fluorometholone 0.1% (FML) 0.1 % DrpS Place 2 drops into the left eye 2 (two) times daily.    furosemide (LASIX) 40 MG tablet Take 0.5 tablets (20 mg total) by mouth once daily.    hydrocortisone 2.5 % cream Apply topically to affected area twice daily as needed    lactulose (CHRONULAC) 10 gram/15 mL solution Take 15 mLs (10 g total) by mouth 3 (three) times daily.    levothyroxine (SYNTHROID) 75 MCG tablet Take 1 tablet (75 mcg total) by mouth once daily.    magnesium oxide (MAG-OX) 400 mg (241.3 mg magnesium) tablet TAKE 1 TABLET(400 MG) BY MOUTH TWICE DAILY    multivitamin capsule Take 1 capsule by mouth once daily.    pantoprazole (PROTONIX) 20 MG tablet Take 1 tablet (20 mg total) by mouth 2 (two) times daily before meals.    polyethylene glycol (GLYCOLAX) 17 gram/dose powder MIX 17 GRAMS (1  capful) IN LIQUID AND DRINK BY MOUTH TWICE DAILY    pravastatin (PRAVACHOL) 20 MG tablet Take 1 tablet (20 mg total) by mouth once daily. Need OFFICE VISIT before next refill, last seen 9/2023. This will be the LAST Rx if visit is not made.    prochlorperazine (COMPAZINE) 10 MG tablet Take 1 tablet (10 mg total) by mouth every 6 (six) hours as needed (migraine or nausea).    ramipriL (ALTACE) 5 MG capsule Take 1 capsule (5 mg total) by mouth once daily.    SHINGRIX, PF, 50 mcg/0.5 mL injection     spironolactone (ALDACTONE) 50 MG tablet Take 1 tablet (50 mg total) by mouth once daily.    tacrolimus (PROGRAF) 0.5 MG Cap Take 1 capsule (0.5 mg total) by mouth every 12 (twelve) hours.    ubrogepant (UBRELVY) 50 mg tablet Take 1 tablet po at onset of migraine. May repeat in 2 hours if needed. Max 2 tablets per day.    valACYclovir (VALTREX) 1000 MG tablet TAKE 2 TABLETS(2000 MG) BY MOUTH TWICE DAILY FOR 2 DOSES (Patient taking differently: TAKE 2 TABLETS(2000 MG) BY MOUTH TWICE DAILY FOR 2 DOSES as needed)    venlafaxine (EFFEXOR-XR) 150 MG Cp24 Take 1 capsule (150 mg total) by mouth once daily.     Family History       Problem Relation (Age of Onset)    Heart disease Mother    No Known Problems Father    Stroke Mother          Tobacco Use    Smoking status: Never    Smokeless tobacco: Never   Substance and Sexual Activity    Alcohol use: No    Drug use: No    Sexual activity: Not on file     Review of Systems   Constitutional:  Negative for chills and fever.   HENT:  Positive for trouble swallowing. Negative for voice change.    Eyes:  Negative for pain and redness.   Respiratory:  Positive for cough. Negative for shortness of breath.    Cardiovascular:  Negative for chest pain and palpitations.   Gastrointestinal:  Positive for abdominal distention and abdominal pain.   Genitourinary:  Negative for flank pain and hematuria.   Musculoskeletal:  Negative for neck pain and neck stiffness.   Skin:  Negative for rash and  wound.   Neurological:  Negative for seizures and syncope.     Objective:     Vital Signs (Most Recent):  Temp: 97.4 °F (36.3 °C) (07/28/24 0855)  Pulse: 62 (07/28/24 0855)  Resp: 18 (07/28/24 1137)  BP: (!) 174/73 (07/28/24 0855)  SpO2: 100 % (07/28/24 0855) Vital Signs (24h Range):  Temp:  [97.4 °F (36.3 °C)-98.4 °F (36.9 °C)] 97.4 °F (36.3 °C)  Pulse:  [61-70] 62  Resp:  [17-20] 18  SpO2:  [97 %-100 %] 100 %  BP: (131-174)/(63-77) 174/73        There is no height or weight on file to calculate BMI.     Physical Exam  Vitals and nursing note reviewed.   Constitutional:       General: She is not in acute distress.     Appearance: She is normal weight. She is not toxic-appearing or diaphoretic.      Interventions: She is not intubated.  HENT:      Head: Normocephalic and atraumatic.      Mouth/Throat:      Mouth: Mucous membranes are moist.      Pharynx: Oropharynx is clear.   Eyes:      General: No scleral icterus.     Conjunctiva/sclera: Conjunctivae normal.   Cardiovascular:      Rate and Rhythm: Normal rate and regular rhythm.      Heart sounds: Normal heart sounds.      No friction rub.   Pulmonary:      Effort: Pulmonary effort is normal. No accessory muscle usage or respiratory distress. She is not intubated.   Abdominal:      General: There is no distension.      Palpations: Abdomen is soft.      Tenderness: There is no abdominal tenderness. There is no guarding.   Musculoskeletal:         General: No swelling or tenderness.      Right lower leg: No edema.      Left lower leg: No edema.   Skin:     General: Skin is warm and dry.      Coloration: Skin is not jaundiced or pale.   Neurological:      Mental Status: She is alert and oriented to person, place, and time.      Motor: No atrophy or seizure activity.   Psychiatric:         Attention and Perception: Attention normal.         Mood and Affect: Affect normal.         Behavior: Behavior is not agitated. Behavior is cooperative.                Significant  Labs: All pertinent labs within the past 24 hours have been reviewed.    Significant Imaging: I have reviewed all pertinent imaging results/findings within the past 24 hours.

## 2024-07-28 NOTE — NURSING
Nurses Note -- 4 Eyes      7/28/2024   2:15 PM      Skin assessed during: Admit      [x] No Altered Skin Integrity Present    []Prevention Measures Documented      [] Yes- Altered Skin Integrity Present or Discovered   [] LDA Added if Not in Epic (Describe Wound)   [] New Altered Skin Integrity was Present on Admit and Documented in LDA   [] Wound Image Taken    Wound Care Consulted? No    Attending Nurse:  Bell Manning RN/Staff Member:  Peggy CLOUD, Bell CLOUD

## 2024-07-28 NOTE — H&P
[facility] - Stepdown Flex ([address] Medicine  History & Physical    Patient Name: Elda Hernandez  MRN: [id number]  Patient Class: IP- Inpatient  Admission Date: 7/28/2024  Attending Physician: Cristian Middleton DO   Primary Care Provider: David Lorenzo MD         Patient information was obtained from patient, spouse/SO, past medical records, and ER records.     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 (on chronic immunosuppression with tacrolimus), cirrhosis of transplant liver with portal hypertension, ascites, hepatic encephalopathy, Arnold-Chiari malformation, migraines, history of craniotomy, syndrome of inappropriate ADH (SIADH) with chronic hyponatremia, hypertension, left bundle branch block, mitral valve prolapse, mitral regurgitation, chronic kidney disease stage 3, kidney angiolipoma, essential tremor, esophageal stenosis on 12/22/2023, 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 She lives in [place], [place]. She is . Her primary care physician is Dr. David Lorenzo. Her hepatologist is Dr. Jaya Nielsen.   She saw Dr. Nielsen in clinic on 6/17/2024. Her furosemide dose was increased from 20 mg to 40 mg and her spironolactone dose was increased from 50 mg to 100 mg due to ascites, which was difficult to treat with paracentesis due to being unable to find a large enough pocket to drain.    She had labs done on 7/25/2024 that showed acute on chronic  hyponatremia (sodium 125 mmol/L), hyperkalemia (potassium 5.8 mmol/L), acute kidney injury (BUN 44 mg/dL and creatinine 5.2 mg/dL, from 18 and 1.3 on 7/9/2024), elevated ammonia (200 umol/L).   She was advised to go to the hospital. She presented to [facility] Emergency Department on 7/27/2024. She had gradually worsening confusion, generalized weakness, and decreased oral intake for the last couple of weeks. She had dizziness for the past week. She left without treatment.    [facility] She had intermittent confusion. Labs showed worsened sodium (119), slightly improved potassium (5.6), worsened BUN (51), improved creatinine (4.0), and normal ammonia (49). Due to her history of liver transplant, her case was discussed with Hepatology at [facility]. She was given normal saline and sodium zirconium cyclosilicate (Lokelma). Sodium improved to 122, hyperkalemia resolved, BUN and creatinine improved. She was transferred to [facility] on 7/28/2024 and admitted to Hospital Medicine Team L.     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    Arnold-Chiari malformation     Bacteremia 12/22/2023    Depression     Esophageal stricture     Essential tremor     Hypertension     Left bundle branch block     Liver fibrosis, transplanted liver 10/02/2018    Suggested on fibroscan 10/2/18    Migraine without aura     MVP (mitral valve prolapse)     Non-rheumatic mitral regurgitation 10/01/2018    Non-rheumatic tricuspid valve insufficiency 10/01/2018    Osteoporosis     Perforated abdominal viscus 09/04/2020    Recurrent urinary tract infection     Seizures     Shingles 2007    SIADH (syndrome of inappropriate ADH production)     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    Tricuspid valve prolapse     Urolithiasis     Von Gierke disease     s/p liver transplant       Past Surgical History:    Procedure Laterality Date    APPENDECTOMY  6/22/2007    APPLICATION OF WOUND VACUUM-ASSISTED CLOSURE DEVICE N/A 9/18/2020    Procedure: APPLICATION, WOUND VAC;  Surgeon: Zain Decker MD;  Location: [facility] OR [facility] [place] [facility] FLR;  Service: General;  Laterality: N/A;    COLONOSCOPY  5/13/2008    internal hemorrhoids    CRANIOTOMY      ESOPHAGOGASTRODUODENOSCOPY N/A 9/3/2020    Procedure: EGD (ESOPHAGOGASTRODUODENOSCOPY);  Surgeon: Tyrel Vergara MD;  Location: [facility];  Service: Endoscopy;  Laterality: N/A;    ESOPHAGOGASTRODUODENOSCOPY N/A 12/22/2023    Procedure: EGD (ESOPHAGOGASTRODUODENOSCOPY);  Surgeon: Jhony James MD;  [facility]  Service: Endoscopy;  Laterality: N/A;    EXPLORATORY LAPAROTOMY  2020    due to perforated stomach    LAMINECTOMY  3/2001    LIVER TRANSPLANT  9/23/2002    OSSICULAR RECONSTRUCTION  10/4/1995    RIGHT REPLACEMENT PROSTHESIS for cholesteatoma    THYMECTOMY  5/2/2007    TONSILLECTOMY, ADENOIDECTOMY  1/21/2004    TOTAL ABDOMINAL HYSTERECTOMY  3/31/1994       Review of patient's allergies indicates:   Allergen Reactions    Codeine Itching     Other reaction(s): Itching    Lipitor [atorvastatin] Other (See Comments)     Other reaction(s): Muscle pain  Muscle cranmps    Morphine Itching     Other reaction(s): nausea and vomiting     Zoloft [sertraline] Other (See Comments)     Tremors/muscle spasms       Current Facility-Administered Medications on File Prior to Encounter   Medication    [COMPLETED] 0.9%  NaCl infusion    [COMPLETED] fentaNYL 50 mcg/mL injection 25 mcg    [COMPLETED] sodium chloride 0.9% bolus 1,000 mL 1,000 mL    [COMPLETED] sodium zirconium cyclosilicate packet 10 g    [DISCONTINUED] sodium zirconium cyclosilicate packet 10 g     Current Outpatient Medications on File Prior to Encounter   Medication Sig    calcium carbonate (TUMS) 200 mg calcium (500 mg) chewable tablet Take 1 tablet (500 mg total) by mouth 2 (two) times daily as needed. (Patient  taking differently: Take 1,500 mg by mouth daily as needed for Heartburn.)    calcium phosphate trib/vit D3 (CALTRATE GUMMY BITES ORAL) Take by mouth.    clonazePAM (KLONOPIN) 0.5 MG tablet Take 1 tablet (0.5 mg total) by mouth 2 (two) times daily.    DENAVIR 1 % cream RICHAR EXT AA Q 2 H FOR 4 DAYS    fluorometholone 0.1% (FML) 0.1 % DrpS Place 2 drops into the left eye 2 (two) times daily.    furosemide (LASIX) 40 MG tablet Take 0.5 tablets (20 mg total) by mouth once daily.    hydrocortisone 2.5 % cream Apply topically to affected area twice daily as needed    lactulose (CHRONULAC) 10 gram/15 mL solution Take 15 mLs (10 g total) by mouth 3 (three) times daily.    levothyroxine (SYNTHROID) 75 MCG tablet Take 1 tablet (75 mcg total) by mouth once daily.    magnesium oxide (MAG-OX) 400 mg (241.3 mg magnesium) tablet TAKE 1 TABLET(400 MG) BY MOUTH TWICE DAILY    multivitamin capsule Take 1 capsule by mouth once daily.    pantoprazole (PROTONIX) 20 MG tablet Take 1 tablet (20 mg total) by mouth 2 (two) times daily before meals.    polyethylene glycol (GLYCOLAX) 17 gram/dose powder MIX 17 GRAMS (1 capful) IN LIQUID AND DRINK BY MOUTH TWICE DAILY    pravastatin (PRAVACHOL) 20 MG tablet Take 1 tablet (20 mg total) by mouth once daily. Need OFFICE VISIT before next refill, last seen 9/2023. This will be the LAST Rx if visit is not made.    prochlorperazine (COMPAZINE) 10 MG tablet Take 1 tablet (10 mg total) by mouth every 6 (six) hours as needed (migraine or nausea).    ramipriL (ALTACE) 5 MG capsule Take 1 capsule (5 mg total) by mouth once daily.    SHINGRIX, PF, 50 mcg/0.5 mL injection     spironolactone (ALDACTONE) 50 MG tablet Take 1 tablet (50 mg total) by mouth once daily.    tacrolimus (PROGRAF) 0.5 MG Cap Take 1 capsule (0.5 mg total) by mouth every 12 (twelve) hours.    ubrogepant (UBRELVY) 50 mg tablet Take 1 tablet po at onset of migraine. May repeat in 2 hours if needed. Max 2 tablets per day.    valACYclovir  (VALTREX) 1000 MG tablet TAKE 2 TABLETS(2000 MG) BY MOUTH TWICE DAILY FOR 2 DOSES (Patient taking differently: TAKE 2 TABLETS(2000 MG) BY MOUTH TWICE DAILY FOR 2 DOSES as needed)    venlafaxine (EFFEXOR-XR) 150 MG Cp24 Take 1 capsule (150 mg total) by mouth once daily.     Family History       Problem Relation (Age of Onset)    Heart disease Mother    No Known Problems Father    Stroke Mother          Tobacco Use    Smoking status: Never    Smokeless tobacco: Never   Substance and Sexual Activity    Alcohol use: No    Drug use: No    Sexual activity: Not on file     Review of Systems   Constitutional:  Negative for chills and fever.   HENT:  Positive for trouble swallowing. Negative for voice change.    Eyes:  Negative for pain and redness.   Respiratory:  Positive for cough. Negative for shortness of breath.    Cardiovascular:  Negative for chest pain and palpitations.   Gastrointestinal:  Positive for abdominal distention and abdominal pain.   Genitourinary:  Negative for flank pain and hematuria.   Musculoskeletal:  Negative for neck pain and neck stiffness.   Skin:  Negative for rash and wound.   Neurological:  Negative for seizures and syncope.     Objective:     Vital Signs (Most Recent):  Temp: 97.4 °F (36.3 °C) (07/28/24 0855)  Pulse: 62 (07/28/24 0855)  Resp: 18 (07/28/24 1137)  BP: (!) 174/73 (07/28/24 0855)  SpO2: 100 % (07/28/24 0855) Vital Signs (24h Range):  Temp:  [97.4 °F (36.3 °C)-98.4 °F (36.9 °C)] 97.4 °F (36.3 °C)  Pulse:  [61-70] 62  Resp:  [17-20] 18  SpO2:  [97 %-100 %] 100 %  BP: (131-174)/(63-77) 174/73        There is no height or weight on file to calculate BMI.     Physical Exam  Vitals and nursing note reviewed.   Constitutional:       General: She is not in acute distress.     Appearance: She is normal weight. She is not toxic-appearing or diaphoretic.      Interventions: She is not intubated.  HENT:      Head: Normocephalic and atraumatic.      Mouth/Throat:      Mouth: Mucous  membranes are moist.      Pharynx: Oropharynx is clear.   Eyes:      General: No scleral icterus.     Conjunctiva/sclera: Conjunctivae normal.   Cardiovascular:      Rate and Rhythm: Normal rate and regular rhythm.      Heart sounds: Normal heart sounds.      No friction rub.   Pulmonary:      Effort: Pulmonary effort is normal. No accessory muscle usage or respiratory distress. She is not intubated.   Abdominal:      General: There is no distension.      Palpations: Abdomen is soft.      Tenderness: There is no abdominal tenderness. There is no guarding.   Musculoskeletal:         General: No swelling or tenderness.      Right lower leg: No edema.      Left lower leg: No edema.   Skin:     General: Skin is warm and dry.      Coloration: Skin is not jaundiced or pale.   Neurological:      Mental Status: She is alert and oriented to person, place, and time.      Motor: No atrophy or seizure activity.   Psychiatric:         Attention and Perception: Attention normal.         Mood and Affect: Affect normal.         Behavior: Behavior is not agitated. Behavior is cooperative.                Significant Labs: All pertinent labs within the past 24 hours have been reviewed.    Significant Imaging: I have reviewed all pertinent imaging results/findings within the past 24 hours.  Assessment/Plan:     * Hyponatremia  SIADH (syndrome of inappropriate ADH production)   Acute on chronic likely due to volume depletion due to diuretic dose increase recently along with poor oral intake. Give normal saline. Monitor labs.     Acute kidney injury superimposed on chronic kidney disease  Improving. Likely due to volume depletion due to diuresis and poor oral fluid intake. Monitor.     Intractable chronic migraine without aura and without status migrainosus  Continue home prochlorperazine.       Hepatic encephalopathy  Continue home lactulose.      Cirrhosis of liver with ascites  Ascites  Hold home furosemide and spironolactone. Has  previously had difficulty getting therapeutic paracentesis. Reports chronic abdominal pain thought to be due to ascites, with pain being worse lately, but abdomen is not distended, has been much bigger in the past.     Chronic constipation  Give lactulose, which she takes for hepatic encephalopathy.      Essential hypertension, dx 2010  Chronic. She takes ramipril. Monitor BP.     Hypothyroidism  Continue home levothyroxine.      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  Chronic rejection of liver transplant   Long-term use of immunosuppressant medication   Check tacrolimus level before resuming home tacrolimus. Transferred here for Hepatology management.      VTE Risk Mitigation (From admission, onward)      None                            Salinas Mullen MD  Department of Hospital Medicine  [facility] - Stepdown Flex ([place]-[facility])

## 2024-07-28 NOTE — ASSESSMENT & PLAN NOTE
SIADH (syndrome of inappropriate ADH production)   Acute on chronic likely due to volume depletion due to diuretic dose increase recently along with poor oral intake. Give normal saline. Monitor labs.

## 2024-07-28 NOTE — PLAN OF CARE
Patient admitted today with hyponatremia. Patient aaox4, ambulatory but weak. Patient vital signs done. Patient admission education given. Patient assessment including 4 eyes done. Patient placed on continuous normal saline, regular diet with 2000 fluid restriction. Patient complains of pain and prn oxycodone give. Patient due medication administered and patient tolerating well. Plan of care reviewed with patient and patient verbalized understanding. All comfort measures and protocols done. Patient made comfortable in bed with  at bed side.         Problem: Adult Inpatient Plan of Care  Goal: Plan of Care Review  Outcome: Progressing  Goal: Patient-Specific Goal (Individualized)  Outcome: Progressing  Goal: Absence of Hospital-Acquired Illness or Injury  Outcome: Progressing  Goal: Optimal Comfort and Wellbeing  Outcome: Progressing  Goal: Readiness for Transition of Care  Outcome: Progressing     Problem: Acute Kidney Injury/Impairment  Goal: Fluid and Electrolyte Balance  Outcome: Progressing  Goal: Improved Oral Intake  Outcome: Progressing  Goal: Effective Renal Function  Outcome: Progressing     Problem: Infection  Goal: Absence of Infection Signs and Symptoms  Outcome: Progressing

## 2024-07-28 NOTE — ASSESSMENT & PLAN NOTE
Ascites  Hold home furosemide and spironolactone. Has previously had difficulty getting therapeutic paracentesis. Reports chronic abdominal pain thought to be due to ascites, with pain being worse lately, but abdomen is not distended, has been much bigger in the past.

## 2024-07-29 LAB
ALBUMIN SERPL BCP-MCNC: 2.4 G/DL (ref 3.5–5.2)
ALP SERPL-CCNC: 408 U/L (ref 55–135)
ALT SERPL W/O P-5'-P-CCNC: 45 U/L (ref 10–44)
ANION GAP SERPL CALC-SCNC: 11 MMOL/L (ref 8–16)
AST SERPL-CCNC: 98 U/L (ref 10–40)
BILIRUB SERPL-MCNC: 4.1 MG/DL (ref 0.1–1)
BUN SERPL-MCNC: 39 MG/DL (ref 6–20)
CALCIUM SERPL-MCNC: 9 MG/DL (ref 8.7–10.5)
CHLORIDE SERPL-SCNC: 102 MMOL/L (ref 95–110)
CO2 SERPL-SCNC: 16 MMOL/L (ref 23–29)
CREAT SERPL-MCNC: 2.5 MG/DL (ref 0.5–1.4)
EST. GFR  (NO RACE VARIABLE): 22 ML/MIN/1.73 M^2
GLUCOSE SERPL-MCNC: 96 MG/DL (ref 70–110)
OHS QRS DURATION: 162 MS
OHS QTC CALCULATION: 485 MS
POTASSIUM SERPL-SCNC: 4.7 MMOL/L (ref 3.5–5.1)
PROT SERPL-MCNC: 6 G/DL (ref 6–8.4)
SODIUM SERPL-SCNC: 129 MMOL/L (ref 136–145)
TACROLIMUS BLD-MCNC: 8.2 NG/ML (ref 5–15)
TACROLIMUS BLD-MCNC: 9.5 NG/ML (ref 5–15)

## 2024-07-29 PROCEDURE — 80053 COMPREHEN METABOLIC PANEL: CPT | Performed by: HOSPITALIST

## 2024-07-29 PROCEDURE — 99223 1ST HOSP IP/OBS HIGH 75: CPT | Mod: GC,,, | Performed by: INTERNAL MEDICINE

## 2024-07-29 PROCEDURE — 63600175 PHARM REV CODE 636 W HCPCS: Performed by: HOSPITALIST

## 2024-07-29 PROCEDURE — 20600001 HC STEP DOWN PRIVATE ROOM

## 2024-07-29 PROCEDURE — 25000003 PHARM REV CODE 250: Performed by: HOSPITALIST

## 2024-07-29 PROCEDURE — 80197 ASSAY OF TACROLIMUS: CPT | Performed by: HOSPITALIST

## 2024-07-29 PROCEDURE — 36415 COLL VENOUS BLD VENIPUNCTURE: CPT | Performed by: HOSPITALIST

## 2024-07-29 RX ORDER — SODIUM BICARBONATE 650 MG/1
1300 TABLET ORAL 2 TIMES DAILY
Status: DISCONTINUED | OUTPATIENT
Start: 2024-07-29 | End: 2024-07-31 | Stop reason: HOSPADM

## 2024-07-29 RX ORDER — CLONAZEPAM 0.5 MG/1
0.5 TABLET ORAL 2 TIMES DAILY PRN
Status: DISCONTINUED | OUTPATIENT
Start: 2024-07-29 | End: 2024-07-31 | Stop reason: HOSPADM

## 2024-07-29 RX ORDER — SPIRONOLACTONE 50 MG/1
50 TABLET, FILM COATED ORAL DAILY
Start: 2024-07-29 | End: 2024-07-31

## 2024-07-29 RX ORDER — PANTOPRAZOLE SODIUM 40 MG/1
40 TABLET, DELAYED RELEASE ORAL DAILY
COMMUNITY

## 2024-07-29 RX ORDER — FUROSEMIDE 40 MG/1
20 TABLET ORAL DAILY
Start: 2024-07-29 | End: 2024-07-31

## 2024-07-29 RX ORDER — TACROLIMUS 0.5 MG/1
0.5 CAPSULE ORAL 2 TIMES DAILY
Status: DISCONTINUED | OUTPATIENT
Start: 2024-07-29 | End: 2024-07-31 | Stop reason: HOSPADM

## 2024-07-29 RX ORDER — OXYCODONE HYDROCHLORIDE 10 MG/1
10 TABLET ORAL EVERY 6 HOURS PRN
Status: DISCONTINUED | OUTPATIENT
Start: 2024-07-29 | End: 2024-07-31 | Stop reason: HOSPADM

## 2024-07-29 RX ORDER — OXYCODONE HYDROCHLORIDE 5 MG/1
5 TABLET ORAL EVERY 6 HOURS PRN
Status: DISCONTINUED | OUTPATIENT
Start: 2024-07-29 | End: 2024-07-31 | Stop reason: HOSPADM

## 2024-07-29 RX ADMIN — OXYCODONE 5 MG: 5 TABLET ORAL at 02:07

## 2024-07-29 RX ADMIN — LACTULOSE 15 G: 20 SOLUTION ORAL at 08:07

## 2024-07-29 RX ADMIN — Medication 400 MG: at 08:07

## 2024-07-29 RX ADMIN — OXYCODONE 5 MG: 5 TABLET ORAL at 08:07

## 2024-07-29 RX ADMIN — LEVOTHYROXINE SODIUM 75 MCG: 75 TABLET ORAL at 05:07

## 2024-07-29 RX ADMIN — OXYCODONE HYDROCHLORIDE 10 MG: 10 TABLET ORAL at 09:07

## 2024-07-29 RX ADMIN — ONDANSETRON 4 MG: 2 INJECTION INTRAMUSCULAR; INTRAVENOUS at 09:07

## 2024-07-29 RX ADMIN — OXYCODONE HYDROCHLORIDE 10 MG: 10 TABLET ORAL at 03:07

## 2024-07-29 RX ADMIN — SODIUM BICARBONATE 650 MG TABLET 1300 MG: at 12:07

## 2024-07-29 RX ADMIN — TACROLIMUS 0.5 MG: 0.5 CAPSULE ORAL at 06:07

## 2024-07-29 RX ADMIN — SODIUM BICARBONATE 650 MG TABLET 1300 MG: at 08:07

## 2024-07-29 RX ADMIN — VENLAFAXINE HYDROCHLORIDE 150 MG: 75 CAPSULE, EXTENDED RELEASE ORAL at 08:07

## 2024-07-29 NOTE — HPI
54-year-old female with medical history of Von Gierke disease status-post liver transplant (09/2002) complicated by a biliary stricture and chronic rejection with ductopenia now with post-transplant cirrhosis complicated by encephalopathy, ascites and portal hypertension, history of esophagitis with mucosal tear, CKD3, perforated viscus, multiple abdominal surgeries and debility who presents as a transfer from [facility] ER for Hepatology evaluation.     Patient was noted to be more confused, dizzy and unsteady for the past few weeks at PCP appointment on 07/25/2024. Labs at that time showed hyperkalemia to 5.8, hyponatremia to 125 and JACQUE with creatinine of 5.2. Ammonia was 200 from prior of 49 (6 months prior). She was advised to present to emergency room.     At OSH ED she presented awake but with intermittent confusion. Her labs were Na 119, Cl 92, K 5.6, BUN 51, Cr 4.0, bilirubin 4, ammonia 49. She was given NS, and lokelma with modest improvement prior to transfer.     Of note, on 06/17/2024 her home medications were increased from lasix 20 mg QD to 40 mg QD and spironolactone from 50 mg QD to 100 mg QD by her hepatologist in an attempt to counter worsening ascites.

## 2024-07-29 NOTE — ASSESSMENT & PLAN NOTE
Ascites  Holding home furosemide and spironolactone. Has previously had difficulty getting therapeutic paracentesis. Reports chronic abdominal pain thought to be due to ascites, with pain being worse lately, but abdomen is not distended, has been much bigger in the past. Giving oxycodone prn. Request IR to try therapeutic paracentesis since diuretics are being held.

## 2024-07-29 NOTE — SUBJECTIVE & OBJECTIVE
Review of Systems   Constitutional:  Positive for appetite change and fatigue. Negative for chills and fever.   HENT:  Negative for congestion and sore throat.    Eyes:  Negative for photophobia and discharge.   Respiratory:  Negative for cough and shortness of breath.    Cardiovascular:  Negative for chest pain, palpitations and leg swelling.   Gastrointestinal:  Negative for abdominal pain, constipation, diarrhea, nausea and vomiting.   Musculoskeletal:  Negative for myalgias.   Skin:  Negative for rash.   Neurological:  Negative for seizures.   Psychiatric/Behavioral:  Negative for behavioral problems.        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    Arnold-Chiari malformation     Bacteremia 12/22/2023    Depression     Esophageal stricture     Essential tremor     Hypertension     Left bundle branch block     Liver fibrosis, transplanted liver 10/02/2018    Suggested on fibroscan 10/2/18    Migraine without aura     MVP (mitral valve prolapse)     Non-rheumatic mitral regurgitation 10/01/2018    Non-rheumatic tricuspid valve insufficiency 10/01/2018    Osteoporosis     Perforated abdominal viscus 09/04/2020    Recurrent urinary tract infection     Seizures     Shingles 2007    SIADH (syndrome of inappropriate ADH production)     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    Tricuspid valve prolapse     Urolithiasis     Von Gierke disease     s/p liver transplant       Past Surgical History:   Procedure Laterality Date    APPENDECTOMY  6/22/2007    APPLICATION OF WOUND VACUUM-ASSISTED CLOSURE DEVICE N/A 9/18/2020    Procedure: APPLICATION, WOUND VAC;  Surgeon: Zain Decker MD;  Location: [address];  Service: General;  Laterality: N/A;    COLONOSCOPY  5/13/2008    internal hemorrhoids    CRANIOTOMY      ESOPHAGOGASTRODUODENOSCOPY N/A 9/3/2020    Procedure: EGD (ESOPHAGOGASTRODUODENOSCOPY);  Surgeon: Tyrel Vergara MD;  Location: [facility];  Service: Endoscopy;   Laterality: N/A;    ESOPHAGOGASTRODUODENOSCOPY N/A 12/22/2023    Procedure: EGD (ESOPHAGOGASTRODUODENOSCOPY);  Surgeon: Jhony James MD;  Location: [facility] ([address] [place] [address]);  Service: Endoscopy;  Laterality: N/A;    EXPLORATORY LAPAROTOMY  2020    due to perforated stomach    LAMINECTOMY  3/2001    LIVER TRANSPLANT  9/23/2002    OSSICULAR RECONSTRUCTION  10/4/1995    RIGHT REPLACEMENT PROSTHESIS for cholesteatoma    THYMECTOMY  5/2/2007    TONSILLECTOMY, ADENOIDECTOMY  1/21/2004    TOTAL ABDOMINAL HYSTERECTOMY  3/31/1994         Review of patient's allergies indicates:   Allergen Reactions    Codeine Itching     Other reaction(s): Itching    Lipitor [atorvastatin] Other (See Comments)     Other reaction(s): Muscle pain  Muscle cranmps    Morphine Itching     Other reaction(s): nausea and vomiting     Zoloft [sertraline] Other (See Comments)     Tremors/muscle spasms         Tobacco Use    Smoking status: Never    Smokeless tobacco: Never   Substance and Sexual Activity    Alcohol use: No    Drug use: No    Sexual activity: Not on file       Facility-Administered Medications Prior to Admission   Medication Dose Route Frequency Provider Last Rate Last Admin    onabotulinumtoxina injection 200 Units  200 Units Intramuscular q12 weeks Constance Duffy NP   200 Units at 12/12/23 0900     Medications Prior to Admission   Medication Sig Dispense Refill Last Dose    calcium carbonate (TUMS) 200 mg calcium (500 mg) chewable tablet Take 1 tablet (500 mg total) by mouth 2 (two) times daily as needed. (Patient taking differently: Take 1,500 mg by mouth daily as needed for Heartburn.)       calcium phosphate trib/vit D3 (CALTRATE GUMMY BITES ORAL) Take by mouth.       clonazePAM (KLONOPIN) 0.5 MG tablet Take 1 tablet (0.5 mg total) by mouth 2 (two) times daily. 60 tablet 5     DENAVIR 1 % cream RICHAR EXT AA Q 2 H FOR 4 DAYS 5 g 3     fluorometholone 0.1% (FML) 0.1 % DrpS Place 2 drops into the left eye 2 (two) times daily.        furosemide (LASIX) 40 MG tablet Take 0.5 tablets (20 mg total) by mouth once daily. 45 tablet 3     hydrocortisone 2.5 % cream Apply topically to affected area twice daily as needed       lactulose (CHRONULAC) 10 gram/15 mL solution Take 15 mLs (10 g total) by mouth 3 (three) times daily. 1892 mL 6     levothyroxine (SYNTHROID) 75 MCG tablet Take 1 tablet (75 mcg total) by mouth once daily. 90 tablet 3     magnesium oxide (MAG-OX) 400 mg (241.3 mg magnesium) tablet TAKE 1 TABLET(400 MG) BY MOUTH TWICE DAILY 180 tablet 3     multivitamin capsule Take 1 capsule by mouth once daily.       pantoprazole (PROTONIX) 20 MG tablet Take 1 tablet (20 mg total) by mouth 2 (two) times daily before meals. 60 tablet 1     polyethylene glycol (GLYCOLAX) 17 gram/dose powder MIX 17 GRAMS (1 capful) IN LIQUID AND DRINK BY MOUTH TWICE DAILY 1020 g 2     pravastatin (PRAVACHOL) 20 MG tablet Take 1 tablet (20 mg total) by mouth once daily. Need OFFICE VISIT before next refill, last seen 9/2023. This will be the LAST Rx if visit is not made. 90 tablet 1     prochlorperazine (COMPAZINE) 10 MG tablet Take 1 tablet (10 mg total) by mouth every 6 (six) hours as needed (migraine or nausea). 60 tablet 11     ramipriL (ALTACE) 5 MG capsule Take 1 capsule (5 mg total) by mouth once daily. 90 capsule 3     SHINGRIX, PF, 50 mcg/0.5 mL injection        spironolactone (ALDACTONE) 50 MG tablet Take 1 tablet (50 mg total) by mouth once daily. 90 tablet 3     tacrolimus (PROGRAF) 0.5 MG Cap Take 1 capsule (0.5 mg total) by mouth every 12 (twelve) hours. 180 capsule 3     ubrogepant (UBRELVY) 50 mg tablet Take 1 tablet po at onset of migraine. May repeat in 2 hours if needed. Max 2 tablets per day. 10 tablet 11     valACYclovir (VALTREX) 1000 MG tablet TAKE 2 TABLETS(2000 MG) BY MOUTH TWICE DAILY FOR 2 DOSES (Patient taking differently: TAKE 2 TABLETS(2000 MG) BY MOUTH TWICE DAILY FOR 2 DOSES as needed) 4 tablet 0     venlafaxine (EFFEXOR-XR) 150 MG  Cp24 Take 1 capsule (150 mg total) by mouth once daily. 90 capsule 3        Objective:     Vital Signs (Most Recent):  Temp: 97.9 °F (36.6 °C) (24)  Pulse: 78 (24)  Resp: 18 (24)  BP: (!) 166/78 (24)  SpO2: 98 % (24) Vital Signs (24h Range):  Temp:  [97.3 °F (36.3 °C)-98 °F (36.7 °C)] 97.9 °F (36.6 °C)  Pulse:  [62-78] 78  Resp:  [17-18] 18  SpO2:  [98 %-100 %] 98 %  BP: (158-174)/(70-81) 166/78        There is no height or weight on file to calculate BMI.       Physical Exam  Constitutional:       General: She is not in acute distress.  HENT:      Head: Normocephalic and atraumatic.      Mouth/Throat:      Mouth: Mucous membranes are moist.   Eyes:      Extraocular Movements: Extraocular movements intact.   Cardiovascular:      Rate and Rhythm: Normal rate and regular rhythm.      Heart sounds: Normal heart sounds. No murmur heard.  Pulmonary:      Effort: Pulmonary effort is normal. No respiratory distress.      Breath sounds: Normal breath sounds. No wheezing or rhonchi.   Abdominal:      General: There is distension.      Tenderness: There is abdominal tenderness.   Musculoskeletal:         General: No swelling. Normal range of motion.      Cervical back: Normal range of motion and neck supple.   Skin:     General: Skin is warm.   Neurological:      General: No focal deficit present.      Mental Status: She is alert and oriented to person, place, and time.   Psychiatric:         Mood and Affect: Mood normal.            MELD 3.0: 30 at 2024  2:56 AM  MELD-Na: 27 at 2024  2:56 AM  Calculated from:  Serum Creatinine: 2.5 mg/dL at 2024  2:56 AM  Serum Sodium: 129 mmol/L at 2024  2:56 AM  Total Bilirubin: 4.1 mg/dL at 2024  2:56 AM  Serum Albumin: 2.4 g/dL at 2024  2:56 AM  INR(ratio): 1.2 at 2024  9:59 PM  Age at listin years  Sex: Female at 2024  2:56 AM      Significant Labs:  BMP:   Recent Labs   Lab 24  0256    GLU 96   *   K 4.7      CO2 16*   BUN 39*   CREATININE 2.5*   CALCIUM 9.0       Significant Imaging:  Labs: Reviewed

## 2024-07-29 NOTE — ASSESSMENT & PLAN NOTE
SIADH (syndrome of inappropriate ADH production)   Acute on chronic due to volume depletion due to diuretic dose increase recently along with poor oral intake. Gave normal saline with significant improvement. Hold normal saline. Monitor labs.

## 2024-07-29 NOTE — ASSESSMENT & PLAN NOTE
Patient with baseline hyponatremia in the setting of known chronic liver disease and SIADH presenting with acute hypovolemic hyponatremia (on chronic hyponatremia), hyperkalemia and JACQUE likely in the setting of recently increased diuretic regimen. Received NS bolus with improvement. Na 129 (07/29/2024). Patient likely cannot tolerate increased dose of diuretics.     Recommendations:  - STOP NS IVF  - Daily BMP while inpatient   - Recommend follow up labs within a week from discharge prior to consideration of diuretic initiation  - When diuretics are restarted, please initiate at lower doses (lasix 20 mg QD and spironolactone 50 mg QD)

## 2024-07-29 NOTE — ASSESSMENT & PLAN NOTE
Chronic rejection of liver transplant   Long-term use of immunosuppressant medication   Transferred here for Hepatology management. Continue home tacrolimus.

## 2024-07-29 NOTE — CONSULTS
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     Patient was noted to be more confused, dizzy and unsteady for the past few weeks at PCP appointment on 07/25/2024. Labs at that time showed hyperkalemia to 5.8, hyponatremia to 125 and JACQUE with creatinine of 5.2. Ammonia was 200 from prior of 49 (6 months prior). She was advised to present to emergency room.     At OSH ED she presented awake but with intermittent confusion. Her labs were Na 119, Cl 92, K 5.6, BUN 51, Cr 4.0, bilirubin 4, ammonia 49. She was given NS, and lokelma with modest improvement prior to transfer.     Of note, on 06/17/2024 her home medications were increased from lasix 20 mg QD to 40 mg QD and spironolactone from 50 mg QD to 100 mg QD by her hepatologist in an attempt to counter worsening ascites.     Review of Systems   Constitutional:  Positive for appetite change and fatigue. Negative for chills and fever.   HENT:  Negative for congestion and sore throat.    Eyes:  Negative for photophobia and discharge.   Respiratory:  Negative for cough and shortness  of breath.    Cardiovascular:  Negative for chest pain, palpitations and leg swelling.   Gastrointestinal:  Negative for abdominal pain, constipation, diarrhea, nausea and vomiting.   Musculoskeletal:  Negative for myalgias.   Skin:  Negative for rash.   Neurological:  Negative for seizures.   Psychiatric/Behavioral:  Negative for behavioral problems.        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    Arnold-Chiari malformation     Bacteremia 12/22/2023    Depression     Esophageal stricture     Essential tremor     Hypertension     Left bundle branch block     Liver fibrosis, transplanted liver 10/02/2018    Suggested on fibroscan 10/2/18    Migraine without aura     MVP (mitral valve prolapse)     Non-rheumatic mitral regurgitation 10/01/2018    Non-rheumatic tricuspid valve insufficiency 10/01/2018    Osteoporosis     Perforated abdominal viscus 09/04/2020    Recurrent urinary tract infection     Seizures     Shingles 2007    SIADH (syndrome of inappropriate ADH production)     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    Tricuspid valve prolapse     Urolithiasis     Von Gierke disease     s/p liver transplant       Past Surgical History:   Procedure Laterality Date    APPENDECTOMY  6/22/2007    APPLICATION OF WOUND VACUUM-ASSISTED CLOSURE DEVICE N/A 9/18/2020    Procedure: APPLICATION, WOUND VAC;  Surgeon: Zain Decker MD;  Location: [facility] OR [address] [place] [address];  Service: General;  Laterality: N/A;    COLONOSCOPY  5/13/2008    internal hemorrhoids    CRANIOTOMY      ESOPHAGOGASTRODUODENOSCOPY N/A 9/3/2020    Procedure: EGD (ESOPHAGOGASTRODUODENOSCOPY);  Surgeon: Tyrel Vergara MD;  Location: [facility];  Service: Endoscopy;  Laterality: N/A;    ESOPHAGOGASTRODUODENOSCOPY N/A 12/22/2023    Procedure: EGD (ESOPHAGOGASTRODUODENOSCOPY);  Surgeon: Jhony James MD;  Location: [address]);  Service: Endoscopy;  Laterality: N/A;    EXPLORATORY LAPAROTOMY  2020    due to  perforated stomach    LAMINECTOMY  3/2001    LIVER TRANSPLANT  9/23/2002    OSSICULAR RECONSTRUCTION  10/4/1995    RIGHT REPLACEMENT PROSTHESIS for cholesteatoma    THYMECTOMY  5/2/2007    TONSILLECTOMY, ADENOIDECTOMY  1/21/2004    TOTAL ABDOMINAL HYSTERECTOMY  3/31/1994         Review of patient's allergies indicates:   Allergen Reactions    Codeine Itching     Other reaction(s): Itching    Lipitor [atorvastatin] Other (See Comments)     Other reaction(s): Muscle pain  Muscle cranmps    Morphine Itching     Other reaction(s): nausea and vomiting     Zoloft [sertraline] Other (See Comments)     Tremors/muscle spasms         Tobacco Use    Smoking status: Never    Smokeless tobacco: Never   Substance and Sexual Activity    Alcohol use: No    Drug use: No    Sexual activity: Not on file       Facility-Administered Medications Prior to Admission   Medication Dose Route Frequency Provider Last Rate Last Admin    onabotulinumtoxina injection 200 Units  200 Units Intramuscular q12 weeks Constance Duffy, NP   200 Units at 12/12/23 0900     Medications Prior to Admission   Medication Sig Dispense Refill Last Dose    calcium carbonate (TUMS) 200 mg calcium (500 mg) chewable tablet Take 1 tablet (500 mg total) by mouth 2 (two) times daily as needed. (Patient taking differently: Take 1,500 mg by mouth daily as needed for Heartburn.)       calcium phosphate trib/vit D3 (CALTRATE GUMMY BITES ORAL) Take by mouth.       clonazePAM (KLONOPIN) 0.5 MG tablet Take 1 tablet (0.5 mg total) by mouth 2 (two) times daily. 60 tablet 5     DENAVIR 1 % cream RICHAR EXT AA Q 2 H FOR 4 DAYS 5 g 3     fluorometholone 0.1% (FML) 0.1 % DrpS Place 2 drops into the left eye 2 (two) times daily.       furosemide (LASIX) 40 MG tablet Take 0.5 tablets (20 mg total) by mouth once daily. 45 tablet 3     hydrocortisone 2.5 % cream Apply topically to affected area twice daily as needed       lactulose (CHRONULAC) 10 gram/15 mL solution Take 15 mLs (10 g  total) by mouth 3 (three) times daily. 1892 mL 6     levothyroxine (SYNTHROID) 75 MCG tablet Take 1 tablet (75 mcg total) by mouth once daily. 90 tablet 3     magnesium oxide (MAG-OX) 400 mg (241.3 mg magnesium) tablet TAKE 1 TABLET(400 MG) BY MOUTH TWICE DAILY 180 tablet 3     multivitamin capsule Take 1 capsule by mouth once daily.       pantoprazole (PROTONIX) 20 MG tablet Take 1 tablet (20 mg total) by mouth 2 (two) times daily before meals. 60 tablet 1     polyethylene glycol (GLYCOLAX) 17 gram/dose powder MIX 17 GRAMS (1 capful) IN LIQUID AND DRINK BY MOUTH TWICE DAILY 1020 g 2     pravastatin (PRAVACHOL) 20 MG tablet Take 1 tablet (20 mg total) by mouth once daily. Need OFFICE VISIT before next refill, last seen 9/2023. This will be the LAST Rx if visit is not made. 90 tablet 1     prochlorperazine (COMPAZINE) 10 MG tablet Take 1 tablet (10 mg total) by mouth every 6 (six) hours as needed (migraine or nausea). 60 tablet 11     ramipriL (ALTACE) 5 MG capsule Take 1 capsule (5 mg total) by mouth once daily. 90 capsule 3     SHINGRIX, PF, 50 mcg/0.5 mL injection        spironolactone (ALDACTONE) 50 MG tablet Take 1 tablet (50 mg total) by mouth once daily. 90 tablet 3     tacrolimus (PROGRAF) 0.5 MG Cap Take 1 capsule (0.5 mg total) by mouth every 12 (twelve) hours. 180 capsule 3     ubrogepant (UBRELVY) 50 mg tablet Take 1 tablet po at onset of migraine. May repeat in 2 hours if needed. Max 2 tablets per day. 10 tablet 11     valACYclovir (VALTREX) 1000 MG tablet TAKE 2 TABLETS(2000 MG) BY MOUTH TWICE DAILY FOR 2 DOSES (Patient taking differently: TAKE 2 TABLETS(2000 MG) BY MOUTH TWICE DAILY FOR 2 DOSES as needed) 4 tablet 0     venlafaxine (EFFEXOR-XR) 150 MG Cp24 Take 1 capsule (150 mg total) by mouth once daily. 90 capsule 3        Objective:     Vital Signs (Most Recent):  Temp: 97.9 °F (36.6 °C) (07/29/24 0337)  Pulse: 78 (07/29/24 0337)  Resp: 18 (07/29/24 0337)  BP: (!) 166/78 (07/29/24 0337)  SpO2: 98 %  (24 0338) Vital Signs (24h Range):  Temp:  [97.3 °F (36.3 °C)-98 °F (36.7 °C)] 97.9 °F (36.6 °C)  Pulse:  [62-78] 78  Resp:  [17-18] 18  SpO2:  [98 %-100 %] 98 %  BP: (158-174)/(70-81) 166/78        There is no height or weight on file to calculate BMI.       Physical Exam  Constitutional:       General: She is not in acute distress.  HENT:      Head: Normocephalic and atraumatic.      Mouth/Throat:      Mouth: Mucous membranes are moist.   Eyes:      Extraocular Movements: Extraocular movements intact.   Cardiovascular:      Rate and Rhythm: Normal rate and regular rhythm.      Heart sounds: Normal heart sounds. No murmur heard.  Pulmonary:      Effort: Pulmonary effort is normal. No respiratory distress.      Breath sounds: Normal breath sounds. No wheezing or rhonchi.   Abdominal:      General: There is distension.      Tenderness: There is abdominal tenderness.   Musculoskeletal:         General: No swelling. Normal range of motion.      Cervical back: Normal range of motion and neck supple.   Skin:     General: Skin is warm.   Neurological:      General: No focal deficit present.      Mental Status: She is alert and oriented to person, place, and time.   Psychiatric:         Mood and Affect: Mood normal.            MELD 3.0: 30 at 2024  2:56 AM  MELD-Na: 27 at 2024  2:56 AM  Calculated from:  Serum Creatinine: 2.5 mg/dL at 2024  2:56 AM  Serum Sodium: 129 mmol/L at 2024  2:56 AM  Total Bilirubin: 4.1 mg/dL at 2024  2:56 AM  Serum Albumin: 2.4 g/dL at 2024  2:56 AM  INR(ratio): 1.2 at 2024  9:59 PM  Age at listin years  Sex: Female at 2024  2:56 AM      Significant Labs:  BMP:   Recent Labs   Lab 24  0256   GLU 96   *   K 4.7      CO2 16*   BUN 39*   CREATININE 2.5*   CALCIUM 9.0       Significant Imaging:  Labs: Reviewed    Assessment/Plan:     Endocrine  * Hyponatremia  Patient with baseline hyponatremia in the setting of known chronic liver  disease and SIADH presenting with acute hypovolemic hyponatremia (on chronic hyponatremia), hyperkalemia and JACQUE likely in the setting of recently increased diuretic regimen. Received NS bolus with improvement. Na 129 (2024). Patient likely cannot tolerate increased dose of diuretics.     Recommendations:  - STOP NS IVF  - Daily BMP while inpatient   - Recommend follow up labs within a week from discharge prior to consideration of diuretic initiation  - When diuretics are restarted, please initiate at lower doses (lasix 20 mg QD and spironolactone 50 mg QD)    SIADH (syndrome of inappropriate ADH production)  Charted history of SIADH. Possibly etiology of baseline hyponatremia    GI  S/P liver transplant 02 for von Gierke disease  Follows with [facility]. Appears to have evidence of chronic rejection of post-transplant liver given ductopenia.     Chronic rejection of liver transplant  Per documentation from transplant meeting in 2024, she is not a candidate for re-transplant given history of multiple upper abdominal surgeries, perforated stomach and esophagus and gastric outlet obstruction.     Home tacrolimus regimen: 0.5 mg BID    MELD 3.0: 30 at 2024  2:56 AM  MELD-Na: 27 at 2024  2:56 AM  Calculated from:  Serum Creatinine: 2.5 mg/dL at 2024  2:56 AM  Serum Sodium: 129 mmol/L at 2024  2:56 AM  Total Bilirubin: 4.1 mg/dL at 2024  2:56 AM  Serum Albumin: 2.4 g/dL at 2024  2:56 AM  INR(ratio): 1.2 at 2024  9:59 PM  Age at listin years  Sex: Female at 2024  2:56 AM       Recommendations:  - Obtain daily tacrolimus level in the am.   - Will determine regimen based on am level         Thank you for your consult. I will follow-up with patient. Please contact us if you have any additional questions.    Misty Peter MD  Hepatology  [facility] ([place])

## 2024-07-29 NOTE — ASSESSMENT & PLAN NOTE
Per documentation from transplant meeting in 2024, she is not a candidate for re-transplant given history of multiple upper abdominal surgeries, perforated stomach and esophagus and gastric outlet obstruction.     Home tacrolimus regimen: 0.5 mg BID    MELD 3.0: 30 at 2024  2:56 AM  MELD-Na: 27 at 2024  2:56 AM  Calculated from:  Serum Creatinine: 2.5 mg/dL at 2024  2:56 AM  Serum Sodium: 129 mmol/L at 2024  2:56 AM  Total Bilirubin: 4.1 mg/dL at 2024  2:56 AM  Serum Albumin: 2.4 g/dL at 2024  2:56 AM  INR(ratio): 1.2 at 2024  9:59 PM  Age at listin years  Sex: Female at 2024  2:56 AM       Recommendations:  - Obtain daily tacrolimus level in the am.   - Will determine regimen based on am level

## 2024-07-29 NOTE — PROGRESS NOTES
[facility] ([address] Medicine  Progress Note    Patient Name: Elda Hernandez  MRN: [id number]  Patient Class: IP- Inpatient   Admission Date: 7/28/2024  Length of Stay: 1 days  Attending Physician: Salinas Mullen MD  Primary Care Provider: David Lorenzo MD        Subjective:     Principal Problem:Hyponatremia        HPI:  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 on 9/23/2023 (on chronic immunosuppression with tacrolimus), cirrhosis of transplant liver with portal hypertension, ascites, hepatic encephalopathy, Arnold-Chiari malformation, migraines, history of craniotomy, syndrome of inappropriate ADH (SIADH) with chronic hyponatremia, hypertension, left bundle branch block, mitral valve prolapse, mitral regurgitation, chronic kidney disease stage 3, kidney angiolipoma, essential tremor, esophageal stenosis on 12/22/2023, 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 She lives in [place], [place]. She is . Her primary care physician is Dr. David Lorenzo. Her hepatologist is Dr. Jaya Nielsen.   She saw Dr. Nielsen in clinic on 6/17/2024. Her furosemide dose was increased from 20 mg to 40 mg and her spironolactone dose was increased from 50 mg to 100 mg due to ascites, which was difficult to treat with paracentesis due to being unable to find a large enough pocket to drain.    She had labs done on 7/25/2024 that showed acute on chronic hyponatremia (sodium 125 mmol/L), hyperkalemia (potassium 5.8 mmol/L), acute kidney injury (BUN 44 mg/dL and creatinine 5.2 mg/dL, from 18 and 1.3 on 7/9/2024),  elevated ammonia (200 umol/L).   She was advised to go to the hospital. She presented to [facility] Emergency Department on 7/27/2024. She had gradually worsening confusion, generalized weakness, and decreased oral intake for the last couple of weeks. She had dizziness for the past week. She left without treatment.    She went to [facility] Emergency Department. She had intermittent confusion. Labs showed worsened sodium (119), slightly improved potassium (5.6), worsened BUN (51), improved creatinine (4.0), and normal ammonia (49). Due to her history of liver transplant, her case was discussed with Hepatology at [facility]. She was given normal saline and sodium zirconium cyclosilicate (Lokelma). Sodium improved to 122, hyperkalemia resolved, BUN and creatinine improved. She was transferred to [facility] on 7/28/2024 and admitted to Hospital Medicine Team L.     Overview/Hospital Course:  Hepatology was consulted as was planned prior to transfer. She was put on normal saline at 100 mL/hr. Hyponatremia and acute kidney injury improved. Hepatology recommended stopping IV fluids and getting outpatient labs prior to resuming diuretics at the previous doses before they were increased.     Interval History: Discussed plan to hold further fluids, continue to hold home diuretics and get outpatient labs before resuming at prior doses before they were increased.    Review of Systems   Constitutional:  Negative for chills and fever.   Gastrointestinal:  Positive for abdominal pain (chronic). Negative for vomiting.   Neurological:  Negative for seizures and syncope.     Objective:     Vital Signs (Most Recent):  Temp: 98.1 °F (36.7 °C) (07/29/24 1130)  Pulse: 79 (07/29/24 1130)  Resp: 18 (07/29/24 1130)  BP: (!) 166/72 (07/29/24 1130)  SpO2: 96 % (07/29/24 1130) Vital Signs (24h Range):  Temp:  [97.3 °F (36.3 °C)-98.1 °F (36.7 °C)] 98.1 °F (36.7  °C)  Pulse:  [69-79] 79  Resp:  [17-18] 18  SpO2:  [96 %-100 %] 96 %  BP: (158-175)/(70-81) 166/72        There is no height or weight on file to calculate BMI.  No intake or output data in the 24 hours ending 07/29/24 1512      Physical Exam  Vitals and nursing note reviewed.   Constitutional:       General: She is not in acute distress.     Appearance: She is well-developed. She is not toxic-appearing or diaphoretic.      Interventions: She is not intubated.  Pulmonary:      Effort: Pulmonary effort is normal. No accessory muscle usage or respiratory distress. She is not intubated.   Neurological:      Mental Status: She is alert and oriented to person, place, and time. Mental status is at baseline.      Motor: No seizure activity.   Psychiatric:         Attention and Perception: Attention normal.         Mood and Affect: Affect normal.         Behavior: Behavior is not agitated. Behavior is cooperative.             Significant Labs: All pertinent labs within the past 24 hours have been reviewed.  Recent Labs   Lab 07/25/24  1701 07/27/24  2159 07/28/24  0206 07/29/24  0256   * 119* 122* 129*   K 5.8* 5.6* 4.7 4.7   CL 94* 92* 94* 102   CO2 19* 18* 19* 16*   BUN 44* 51* 46* 39*   CREATININE 5.2* 4.0* 3.5* 2.5*   CALCIUM 9.6 9.3 8.8 9.0   PROT 6.9 6.7  --  6.0   BILITOT 3.5* 4.2*  --  4.1*   ALKPHOS 474* 422*  --  408*   ALT 46* 39  --  45*   AST 78* 78*  --  98*         Significant Imaging: I have reviewed all pertinent imaging results/findings within the past 24 hours.    Assessment/Plan:      * Hyponatremia  SIADH (syndrome of inappropriate ADH production)   Acute on chronic due to volume depletion due to diuretic dose increase recently along with poor oral intake. Gave normal saline with significant improvement. Hold normal saline. Monitor labs.     Acute kidney injury superimposed on chronic kidney disease  Improving. Due to volume depletion due to diuresis and poor oral fluid intake. Monitor.      Intractable chronic migraine without aura and without status migrainosus  Continue home prochlorperazine.       Hepatic encephalopathy  Continue home lactulose.      Cirrhosis of liver with ascites  Ascites  Holding home furosemide and spironolactone. Has previously had difficulty getting therapeutic paracentesis. Reports chronic abdominal pain thought to be due to ascites, with pain being worse lately, but abdomen is not distended, has been much bigger in the past. Giving oxycodone prn. Request IR to try therapeutic paracentesis since diuretics are being held.     Chronic constipation  Give lactulose, which she takes for hepatic encephalopathy.      Essential hypertension, dx 2010  Chronic. She takes ramipril. Monitor BP.     Hypothyroidism  Continue home levothyroxine.      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  Chronic rejection of liver transplant   Long-term use of immunosuppressant medication   Transferred here for Hepatology management. Continue home tacrolimus.      VTE Risk Mitigation (From admission, onward)      None            Discharge Planning   MARILEE:      Code Status: Full Code   Is the patient medically ready for discharge?:     Reason for patient still in hospital (select all that apply): Patient trending condition and Treatment  Discharge Plan A: Home with family                  Salinas Mullen MD  Department of Hospital Medicine   [facility] ([address])

## 2024-07-29 NOTE — SUBJECTIVE & OBJECTIVE
Interval History: Discussed plan to hold further fluids, continue to hold home diuretics and get outpatient labs before resuming at prior doses before they were increased.    Review of Systems   Constitutional:  Negative for chills and fever.   Gastrointestinal:  Positive for abdominal pain (chronic). Negative for vomiting.   Neurological:  Negative for seizures and syncope.     Objective:     Vital Signs (Most Recent):  Temp: 98.1 °F (36.7 °C) (07/29/24 1130)  Pulse: 79 (07/29/24 1130)  Resp: 18 (07/29/24 1130)  BP: (!) 166/72 (07/29/24 1130)  SpO2: 96 % (07/29/24 1130) Vital Signs (24h Range):  Temp:  [97.3 °F (36.3 °C)-98.1 °F (36.7 °C)] 98.1 °F (36.7 °C)  Pulse:  [69-79] 79  Resp:  [17-18] 18  SpO2:  [96 %-100 %] 96 %  BP: (158-175)/(70-81) 166/72        There is no height or weight on file to calculate BMI.  No intake or output data in the 24 hours ending 07/29/24 1512      Physical Exam  Vitals and nursing note reviewed.   Constitutional:       General: She is not in acute distress.     Appearance: She is well-developed. She is not toxic-appearing or diaphoretic.      Interventions: She is not intubated.  Pulmonary:      Effort: Pulmonary effort is normal. No accessory muscle usage or respiratory distress. She is not intubated.   Neurological:      Mental Status: She is alert and oriented to person, place, and time. Mental status is at baseline.      Motor: No seizure activity.   Psychiatric:         Attention and Perception: Attention normal.         Mood and Affect: Affect normal.         Behavior: Behavior is not agitated. Behavior is cooperative.             Significant Labs: All pertinent labs within the past 24 hours have been reviewed.  Recent Labs   Lab 07/25/24  1701 07/27/24  2159 07/28/24  0206 07/29/24  0256   * 119* 122* 129*   K 5.8* 5.6* 4.7 4.7   CL 94* 92* 94* 102   CO2 19* 18* 19* 16*   BUN 44* 51* 46* 39*   CREATININE 5.2* 4.0* 3.5* 2.5*   CALCIUM 9.6 9.3 8.8 9.0   PROT 6.9 6.7  --   6.0   BILITOT 3.5* 4.2*  --  4.1*   ALKPHOS 474* 422*  --  408*   ALT 46* 39  --  45*   AST 78* 78*  --  98*         Significant Imaging: I have reviewed all pertinent imaging results/findings within the past 24 hours.

## 2024-07-29 NOTE — ASSESSMENT & PLAN NOTE
Follows with [facility] Hepatology. Appears to have evidence of chronic rejection of post-transplant liver given ductopenia.

## 2024-07-29 NOTE — PROGRESS NOTES
VSS. IR consulted for therapeutic para per MD. Patient pain controlled on current pain regimen. NS gtt d/c'd. Patient resting at this time with needs met and safety measures in place.     07/29/24 0815   Pain/Comfort/Sleep   Pain Rating (0-10): Rest 5   rFLACC Pain Rating: - Face 0-->no particular expression or smile   rFLACC Pain Rating: - Legs 0-->normal position or relaxed   rFLACC Pain Rating: - Activity 0-->lying quietly, normal position, moves easily   rFLACC Pain Rating: - Cry 0-->no cry (awake or asleep)   rFLACC Pain Rating: - Consolability 0-->content, relaxed   rFLACC Score: 0   POSS ([place] Opioid-Induced Sed Scale) 1 - Awake and alert   Pain Reassessment   Pain Rating Prior to Med Admin 5   Cognitive   Cognitive/Neuro/Behavioral WDL WDL   Level of Consciousness (AVPU) alert   Arousal Level opens eyes spontaneously   Orientation oriented x 4   Speech clear/fluent;follows commands   Mood/Behavior calm;cooperative   Pupils   Pupil PERRLA yes   Pupil Size Left 3 mm   Pupil Shape Left round   Pupil Reaction Left equal;brisk   Pupil Accommodation Left normal response   Pupil Size Right 3 mm   Pupil Shape Right round   Pupil Reaction Right brisk;equal   Pupil Accommodation Right normal response   [place] Coma Scale   Best Eye Response 4-->(E4) spontaneous   Best Motor Response 6-->(M6) obeys commands   Best Verbal Response 5-->(V5) oriented   [place] Coma Scale Score 15   Motor Response   Motor Response general motor response   General Motor Response purposeful motor response   HEENT   HEENT WDL WDL   Throat Signs/Symptoms no swelling;no redness;no discomfort   Respiratory   Respiratory WDL WDL   Rhythm/Pattern, Respiratory no shortness of breath reported;chest wiggle adequate;depth regular;pattern regular;unlabored   Expansion/Accessory Muscles/Retractions no retractions;no use of accessory muscles   Nailbeds no discoloration   Breath Sounds   All Lung Fields Breath Sounds Anterior:;clear   Cardiac   Cardiac  WDL WDL   Cardiac Rhythm apical pulse regular;radial pulse regular   Peripheral Neurovascular   Peripheral Neurovascular WDL WDL   VTE Prevention/Management remove, assess skin, and reapply sequential compression device   All Extremities Neurovascular Assessment   General All Extremity Temperature warm   General All Extremity Color no discoloration   General All Extremity Sensation no tingling;no numbness   Pulse Radial   Left Radial Pulse 2+ (normal)   Right Radial Pulse 2+ (normal)   Pulse Ulnar   Left Ulnar Pulse 2+ (normal)   Right Ulnar Pulse 2+ (normal)   Pulse Dorsalis Pedis   Left Dorsalis Pedis Pulse 2+ (normal)   Right Dorsalis Pedis Pulse 2+ (normal)   Pulse Posterior Tibial   Left Posterior Tibial Pulse 2+ (normal)   Right Posterior Tibial Pulse 2+ (normal)        Peripheral IV - Single Lumen 07/27/24 2200 18 G 1/2 in Anterior;Right Upper Arm   Placement Date/Time: 07/27/24 2200   Present Prior to Hospital Arrival?: No  Inserted by: RN  Size (G): 18 G  Length (in): 1/2 in  Orientation: Anterior;Right  Location: Upper Arm  Placement directed by: Ultrasound  Site Prep: Chlorhexidine   Local An...   Site Assessment Clean;Dry;Intact;No redness;No swelling   Extremity Assessment Distal to IV No warmth;No swelling;No redness;No abnormal discoloration   Line Status Infusing   Dressing Status Clean;Dry;Intact   Dressing Intervention Integrity maintained   Dressing Change Due 07/31/24   Site Change Due 07/31/24   Reason Not Rotated Not due   Skin   Skin WDL ex   Skin Color/Characteristics without discoloration   Skin Temperature warm   Skin Integrity bruised (ecchymotic)   Miguel Risk Assessment   Sensory Perception 4-->no impairment   Moisture 4-->rarely moist   Activity 3-->walks occasionally   Mobility 3-->slightly limited   Nutrition 2-->probably inadequate   Friction and Shear 3-->no apparent problem   Miguel Score 19   Musculoskeletal   Musculoskeletal WDL ex   General Mobility generalized weakness    Gastrointestinal   GI [facility]   Abdominal Appearance rounded;nondistended   Abdominal Palpation All Quadrants   All Quadrants Abdominal Palpation tender   GI Signs/Symptoms abdominal discomfort   Last Bowel Movement 07/28/24   Genitourinary   Genitourinary [facility]   Coping/Psychosocial   Observed Emotional State accepting   Verbalized Emotional State acceptance   Plan of Care Reviewed With patient   Safety   Safety [facility]   Safety Factors wheels locked;bed in low position;call light in reach;upper side rails raised x 2;ID band on   Safety Precautions emergency equipment at bedside   Enhanced Safety Measures room near unit station   Infection Prevention environmental surveillance performed   Isolation Precautions precautions maintained   Fall Risk Assessment (every shift)   History Of Fall (W/I 3 Mos) 0-->No   Polypharmacy 3-->Yes   Central Nervous System/Psychotropic Medication 0-->No   Cardiovascular Medication 0-->No   Age Greater Than 65 Years 0-->No   Altered Elimination 0-->No   Cognitive Deficit 0-->No   Sensory Deficit 0-->No   Dizziness/Vertigo 0-->No   Depression 0-->No   Mobility Deficit/Weakness 2-->Yes   Male 0-->No   Fall Risk Score 5   ABC Risk for Fall with Injury Assessment   A= Age: Is the patient greater than or equal to 85 years old or frail due to clinical condition? No   B=Bones: Does the patient have osteoporosis, previous fracture, prolonged steroid use, or metastatic bone cancer? No   C=Coagulation Disorders: Does the patient have a bleeding disorder, either through anticoagulants or underlying clinical condition? No   S=recent Surgery: Is the patient post-op surgicalwith a recent lower limb amputation or recent major abdominal or thoracic surgery? No   Safety Management   Safety Promotion/Fall Prevention family to remain at bedside;lighting adjusted;medications reviewed;nonskid shoes/socks when out of bed   Medication Review/Management medications reviewed   Patient Rounds bed in low  position;bed wheels locked;clutter free environment maintained;ID band on;placement of personal items at bedside;toileting offered;visualized patient;call light in patient/parent reach   Safety Bands on Patient Fall Risk Band   Daily Care   Activity Management Ambulated -L4   Activity Assistance Provided assistance, 1 person   Mobility   GEMS ([place] Early Mobility Scale) Level 4-Independent activities   Positioning   Body Position position changed independently   Head of Bed (HOB) Positioning HOB elevated   Positioning/Transfer Devices pillows;in use

## 2024-07-29 NOTE — HOSPITAL COURSE
Hepatology was consulted as was planned prior to transfer. She was put on normal saline at 100 mL/hr. Sodium improved to 129 mmol/L by 7/29/2024 and acute kidney injury improved. Hepatology recommended stopping IV fluids and resuming diuretics at the previous doses before they were increased. IV fluids were stopped. BUN and creatinine continued to improve but sodium decreased a little to 127. Interventional Radiology was unable to find a pocket of fluid for therapeutic paracentesis, a problem she had in the past. She was discharged home. Sodium was 129 at discharge. She was advised to hold furosemide and spironolactone until she gets outpatient labs and follows up with her hepatologist.

## 2024-07-30 LAB
ALBUMIN SERPL BCP-MCNC: 2.3 G/DL (ref 3.5–5.2)
ALP SERPL-CCNC: 371 U/L (ref 55–135)
ALT SERPL W/O P-5'-P-CCNC: 40 U/L (ref 10–44)
ANION GAP SERPL CALC-SCNC: 7 MMOL/L (ref 8–16)
AST SERPL-CCNC: 71 U/L (ref 10–40)
BILIRUB SERPL-MCNC: 4 MG/DL (ref 0.1–1)
BUN SERPL-MCNC: 32 MG/DL (ref 6–20)
CALCIUM SERPL-MCNC: 8.7 MG/DL (ref 8.7–10.5)
CHLORIDE SERPL-SCNC: 102 MMOL/L (ref 95–110)
CO2 SERPL-SCNC: 18 MMOL/L (ref 23–29)
CREAT SERPL-MCNC: 2 MG/DL (ref 0.5–1.4)
EST. GFR  (NO RACE VARIABLE): 28.8 ML/MIN/1.73 M^2
GLUCOSE SERPL-MCNC: 93 MG/DL (ref 70–110)
POTASSIUM SERPL-SCNC: 5.1 MMOL/L (ref 3.5–5.1)
PROT SERPL-MCNC: 5.5 G/DL (ref 6–8.4)
SODIUM SERPL-SCNC: 127 MMOL/L (ref 136–145)
SODIUM SERPL-SCNC: 131 MMOL/L (ref 136–145)
TACROLIMUS BLD-MCNC: 6.8 NG/ML (ref 5–15)

## 2024-07-30 PROCEDURE — 84295 ASSAY OF SERUM SODIUM: CPT | Performed by: HOSPITALIST

## 2024-07-30 PROCEDURE — 63600175 PHARM REV CODE 636 W HCPCS: Performed by: HOSPITALIST

## 2024-07-30 PROCEDURE — 80197 ASSAY OF TACROLIMUS: CPT | Performed by: HOSPITALIST

## 2024-07-30 PROCEDURE — 36415 COLL VENOUS BLD VENIPUNCTURE: CPT | Performed by: HOSPITALIST

## 2024-07-30 PROCEDURE — 25000003 PHARM REV CODE 250: Performed by: HOSPITALIST

## 2024-07-30 PROCEDURE — 80053 COMPREHEN METABOLIC PANEL: CPT | Performed by: HOSPITALIST

## 2024-07-30 PROCEDURE — 20600001 HC STEP DOWN PRIVATE ROOM

## 2024-07-30 RX ADMIN — TACROLIMUS 0.5 MG: 0.5 CAPSULE ORAL at 06:07

## 2024-07-30 RX ADMIN — OXYCODONE HYDROCHLORIDE 10 MG: 10 TABLET ORAL at 08:07

## 2024-07-30 RX ADMIN — LACTULOSE 15 G: 20 SOLUTION ORAL at 07:07

## 2024-07-30 RX ADMIN — OXYCODONE HYDROCHLORIDE 10 MG: 10 TABLET ORAL at 01:07

## 2024-07-30 RX ADMIN — OXYCODONE 5 MG: 5 TABLET ORAL at 06:07

## 2024-07-30 RX ADMIN — LEVOTHYROXINE SODIUM 75 MCG: 75 TABLET ORAL at 06:07

## 2024-07-30 RX ADMIN — Medication 400 MG: at 07:07

## 2024-07-30 RX ADMIN — VENLAFAXINE HYDROCHLORIDE 150 MG: 75 CAPSULE, EXTENDED RELEASE ORAL at 07:07

## 2024-07-30 RX ADMIN — SODIUM BICARBONATE 650 MG TABLET 1300 MG: at 07:07

## 2024-07-30 RX ADMIN — OXYCODONE HYDROCHLORIDE 10 MG: 10 TABLET ORAL at 04:07

## 2024-07-30 RX ADMIN — Medication 400 MG: at 08:07

## 2024-07-30 RX ADMIN — TACROLIMUS 0.5 MG: 0.5 CAPSULE ORAL at 07:07

## 2024-07-30 RX ADMIN — LACTULOSE 15 G: 20 SOLUTION ORAL at 08:07

## 2024-07-30 RX ADMIN — SODIUM BICARBONATE 650 MG TABLET 1300 MG: at 08:07

## 2024-07-30 NOTE — PROGRESS NOTES
VSS. Pain controlled. Multiple Bms.  IR unable to do paracentesis. Trend sodium. Possible d/c tomorrow. Patient resting at this time with needs met and safety measures in place.   07/30/24 0749   Pain/Comfort/Sleep   Pain Rating (0-10): Rest 0   rFLACC Pain Rating: - Face 0-->no particular expression or smile   rFLACC Pain Rating: - Legs 0-->normal position or relaxed   rFLACC Pain Rating: - Activity 0-->lying quietly, normal position, moves easily   rFLACC Pain Rating: - Cry 0-->no cry (awake or asleep)   rFLACC Pain Rating: - Consolability 0-->content, relaxed   rFLACC Score: 0   Cognitive   Cognitive/Neuro/Behavioral WDL WDL   Level of Consciousness (AVPU) alert   Arousal Level opens eyes spontaneously   Orientation oriented x 4   Speech clear/fluent;follows commands   Mood/Behavior calm;cooperative   Pupils   Pupil PERRLA yes   Pupil Size Left 3 mm   Pupil Shape Left round   Pupil Reaction Left brisk;equal   Pupil Accommodation Left normal response   Pupil Size Right 3 mm   Pupil Shape Right round   Pupil Reaction Right brisk;equal   Pupil Accommodation Right normal response   [place] Coma Scale   Best Eye Response 4-->(E4) spontaneous   Best Motor Response 6-->(M6) obeys commands   Best Verbal Response 5-->(V5) oriented   [place] Coma Scale Score 15   HEENT   HEENT WDL WDL   Throat Signs/Symptoms no swelling;no redness;no discomfort   Respiratory   Respiratory WDL WDL   Rhythm/Pattern, Respiratory unlabored;pattern regular;depth regular;no shortness of breath reported;chest wiggle adequate   Expansion/Accessory Muscles/Retractions no retractions;no use of accessory muscles   Nailbeds no discoloration   Breath Sounds   Breath Sounds All Fields   All Lung Fields Breath Sounds Anterior:;clear   Oxygen Therapy   Device (Oxygen Therapy) room air   Cardiac   Cardiac WDL WDL   Cardiac Rhythm apical pulse regular;radial pulse regular   Peripheral Neurovascular   Peripheral Neurovascular WDL WDL   VTE  Prevention/Management remove, assess skin, and reapply sequential compression device   All Extremities Neurovascular Assessment   General All Extremity Temperature warm   General All Extremity Color no discoloration   General All Extremity Sensation no numbness;no tingling   Pulse Radial   Left Radial Pulse 2+ (normal)   Right Radial Pulse 2+ (normal)   Pulse Ulnar   Left Ulnar Pulse 2+ (normal)   Right Ulnar Pulse 2+ (normal)   Pulse Dorsalis Pedis   Left Dorsalis Pedis Pulse 2+ (normal)   Right Dorsalis Pedis Pulse 2+ (normal)   Pulse Posterior Tibial   Left Posterior Tibial Pulse 2+ (normal)   Right Posterior Tibial Pulse 2+ (normal)        Peripheral IV - Single Lumen 07/27/24 2200 18 G 1/2 in Anterior;Right Upper Arm   Placement Date/Time: 07/27/24 2200   Present Prior to Hospital Arrival?: No  Inserted by: RN  Size (G): 18 G  Length (in): 1/2 in  Orientation: Anterior;Right  Location: Upper Arm  Placement directed by: Ultrasound  Site Prep: Chlorhexidine   Local An...   Site Assessment Clean;Dry;Intact;No redness;No swelling;No warmth;No drainage   Extremity Assessment Distal to IV No warmth;No swelling;No redness;No abnormal discoloration   Line Status Saline locked   Dressing Status Clean;Intact;Dry   Dressing Intervention Integrity maintained   Skin   Skin WDL ex   Skin Color/Characteristics without discoloration   Skin Temperature warm   Skin Integrity bruised (ecchymotic)   Bed Support Surface Assessed;Firm   Miguel Risk Assessment   Sensory Perception 4-->no impairment   Moisture 4-->rarely moist   Activity 3-->walks occasionally   Mobility 3-->slightly limited   Nutrition 2-->probably inadequate   Friction and Shear 3-->no apparent problem   Miguel Score 19   Musculoskeletal   Musculoskeletal WDL ex   General Mobility generalized weakness   Gastrointestinal   GI WDL WDL   Abdominal Appearance nondistended;rounded   Abdominal Palpation All Quadrants   Last Bowel Movement 07/29/24   Genitourinary    Genitourinary [facility]   Coping/Psychosocial   Observed Emotional State accepting;calm;cooperative   Verbalized Emotional State acceptance   Plan of Care Reviewed With patient   Safety   Safety [facility]   Safety Factors ID band on;call light in reach;wheels locked;bed in low position;upper side rails raised x 2   Fall Risk Assessment (every shift)   History Of Fall (W/I 3 Mos) 0-->No   Polypharmacy 3-->Yes   Central Nervous System/Psychotropic Medication 0-->No   Cardiovascular Medication 0-->No   Age Greater Than 65 Years 0-->No   Altered Elimination 0-->No   Cognitive Deficit 0-->No   Sensory Deficit 0-->No   Dizziness/Vertigo 0-->No   Depression 0-->No   Mobility Deficit/Weakness 2-->Yes   Male 0-->No   Fall Risk Score 5   ABC Risk for Fall with Injury Assessment   A= Age: Is the patient greater than or equal to 85 years old or frail due to clinical condition? No   B=Bones: Does the patient have osteoporosis, previous fracture, prolonged steroid use, or metastatic bone cancer? No   C=Coagulation Disorders: Does the patient have a bleeding disorder, either through anticoagulants or underlying clinical condition? No   S=recent Surgery: Is the patient post-op surgicalwith a recent lower limb amputation or recent major abdominal or thoracic surgery? No   Safety Management   Safety Promotion/Fall Prevention medications reviewed;nonskid shoes/socks when out of bed;assistive device/personal item within reach   Medication Review/Management medications reviewed   Patient Rounds bed in low position;ID band on;visualized patient;bed wheels locked;placement of personal items at bedside;toileting offered;clutter free environment maintained;call light in patient/parent reach   Safety Bands on Patient Fall Risk Band   Daily Care   Activity Management Ambulated to bathroom - L4   Activity Assistance Provided assistance, 1 person   Mobility   GEMS ([place] Early Mobility Scale) Level 4-Independent activities   Positioning    Body Position position changed independently   Head of Bed (HOB) Positioning HOB elevated   Positioning/Transfer Devices pillows;in use

## 2024-07-30 NOTE — ASSESSMENT & PLAN NOTE
SIADH (syndrome of inappropriate ADH production)   Acute on chronic due to volume depletion due to diuretic dose increase recently along with poor oral intake. Gave normal saline with significant improvement. Holding IV fluids. Monitor sodium level.

## 2024-07-30 NOTE — H&P
Inpatient Radiology Pre-procedure Note    History of Present Illness:  Elda Hernandez is a 54 y.o. female who presents for US guided paracentesis.    Admission H&P reviewed.  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    Arnold-Chiari malformation     Bacteremia 12/22/2023    Depression     Esophageal stricture     Essential tremor     Hypertension     Left bundle branch block     Liver fibrosis, transplanted liver 10/02/2018    Suggested on fibroscan 10/2/18    Migraine without aura     MVP (mitral valve prolapse)     Non-rheumatic mitral regurgitation 10/01/2018    Non-rheumatic tricuspid valve insufficiency 10/01/2018    Osteoporosis     Perforated abdominal viscus 09/04/2020    Recurrent urinary tract infection     Seizures     Shingles 2007    SIADH (syndrome of inappropriate ADH production)     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    Tricuspid valve prolapse     Urolithiasis     Von Gierke disease     s/p liver transplant     Past Surgical History:   Procedure Laterality Date    APPENDECTOMY  6/22/2007    APPLICATION OF WOUND VACUUM-ASSISTED CLOSURE DEVICE N/A 9/18/2020    Procedure: APPLICATION, WOUND VAC;  Surgeon: Zain Decker MD;  Location: [facility] OR [address] [place] [address];  Service: General;  Laterality: N/A;    COLONOSCOPY  5/13/2008    internal hemorrhoids    CRANIOTOMY      ESOPHAGOGASTRODUODENOSCOPY N/A 9/3/2020    Procedure: EGD (ESOPHAGOGASTRODUODENOSCOPY);  Surgeon: Tyrel Vergara MD;  Location: [facility];  Service: Endoscopy;  Laterality: N/A;    ESOPHAGOGASTRODUODENOSCOPY N/A 12/22/2023    Procedure: EGD (ESOPHAGOGASTRODUODENOSCOPY);  Surgeon: Jhony James MD;  Location: [address]);  Service: Endoscopy;  Laterality: N/A;    EXPLORATORY LAPAROTOMY  2020    due to perforated stomach    LAMINECTOMY  3/2001    LIVER TRANSPLANT  9/23/2002    OSSICULAR RECONSTRUCTION  10/4/1995    RIGHT REPLACEMENT PROSTHESIS for cholesteatoma    THYMECTOMY  5/2/2007     TONSILLECTOMY, ADENOIDECTOMY  1/21/2004    TOTAL ABDOMINAL HYSTERECTOMY  3/31/1994       Review of Systems:   As documented in primary team H&P    Home Meds:   Prior to Admission medications    Medication Sig Start Date End Date Taking? Authorizing Provider   clonazePAM (KLONOPIN) 0.5 MG tablet Take 1 tablet (0.5 mg total) by mouth 2 (two) times daily. 1/25/24  Yes David Lorenzo MD   fluorometholone 0.1% (FML) 0.1 % DrpS Place 2 drops into the left eye 2 (two) times daily.   Yes Provider, Historical   lactulose (CHRONULAC) 10 gram/15 mL solution Take 15 mLs (10 g total) by mouth 3 (three) times daily. 1/10/24  Yes Sangita Hayes MD   levothyroxine (SYNTHROID) 75 MCG tablet Take 1 tablet (75 mcg total) by mouth once daily. 8/13/23  Yes David Lorenzo MD   magnesium oxide (MAG-OX) 400 mg (241.3 mg magnesium) tablet TAKE 1 TABLET(400 MG) BY MOUTH TWICE DAILY 8/13/23  Yes David Lorenzo MD   pantoprazole (PROTONIX) 40 MG tablet Take 40 mg by mouth once daily.   Yes Provider, Historical   pravastatin (PRAVACHOL) 20 MG tablet Take 1 tablet (20 mg total) by mouth once daily. Need OFFICE VISIT before next refill, last seen 9/2023. This will be the LAST Rx if visit is not made. 4/8/24  Yes Dave Upton MD   ramipriL (ALTACE) 5 MG capsule Take 1 capsule (5 mg total) by mouth once daily. 8/13/23  Yes David Lorenzo MD   tacrolimus (PROGRAF) 0.5 MG Cap Take 1 capsule (0.5 mg total) by mouth every 12 (twelve) hours. 9/28/23  Yes Jaya Nielsen MD   venlafaxine (EFFEXOR-XR) 150 MG Cp24 Take 1 capsule (150 mg total) by mouth once daily. 8/13/23  Yes David Lorenzo MD   furosemide (LASIX) 40 MG tablet Take 0.5 tablets (20 mg total) by mouth once daily. Hold until you follow up with your doctor. You will resume it at 20 mg. 7/29/24 7/29/25  Salinas Mullen MD   prochlorperazine (COMPAZINE) 10 MG tablet Take 1 tablet (10 mg total) by mouth every 6 (six) hours as needed (migraine or nausea). 11/14/23   Constance Duffy  B., NP   spironolactone (ALDACTONE) 50 MG tablet Take 1 tablet (50 mg total) by mouth once daily. Hold until you follow up with your doctor. You will resume it at 50 mg. 7/29/24 7/29/25  Salinas Mullen MD     Scheduled Meds:    lactulose  15 g Oral TID    levothyroxine  75 mcg Oral Before breakfast    magnesium oxide  400 mg Oral BID    sodium bicarbonate  1,300 mg Oral BID    tacrolimus  0.5 mg Oral BID    venlafaxine  150 mg Oral Daily     Continuous Infusions:   PRN Meds:  Current Facility-Administered Medications:     acetaminophen, 500 mg, Oral, Q6H PRN    calcium carbonate, 1,500 mg, Oral, Daily PRN    clonazePAM, 0.5 mg, Oral, BID PRN    ondansetron, 4 mg, Intravenous, Q6H PRN    oxyCODONE, 5 mg, Oral, Q6H PRN    oxyCODONE, 10 mg, Oral, Q6H PRN    prochlorperazine, 10 mg, Oral, Q6H PRN  Anticoagulants/Antiplatelets: no anticoagulation    Allergies:   Review of patient's allergies indicates:   Allergen Reactions    Codeine Itching     Other reaction(s): Itching    Lipitor [atorvastatin] Other (See Comments)     Other reaction(s): Muscle pain  Muscle cranmps    Morphine Itching     Other reaction(s): nausea and vomiting     Zoloft [sertraline] Other (See Comments)     Tremors/muscle spasms     Sedation Hx: have not been any systemic reactions    Vitals:  Temp: 97.8 °F (36.6 °C) (07/30/24 1130)  Pulse: 76 (07/30/24 1204)  Resp: 19 (07/30/24 1204)  BP: (!) 147/59 (07/30/24 1204)  SpO2: 99 % (07/30/24 1204)     Physical Exam:  ASA: 2  Mallampati: n/a    General: no acute distress  Mental Status: alert and oriented to person, place and time  HEENT: normocephalic, atraumatic  Chest: unlabored breathing  Heart: regular heart rate  Abdomen: distended  Extremity: moves all extremities    Plan: US guided paracentesis  Sedation Plan: Local    Diana Hewitt PA-C  Interventional Radiology  [phone]

## 2024-07-30 NOTE — SUBJECTIVE & OBJECTIVE
Interval History: Will monitor sodium levels another night. Likely can go home tomorrow.    Review of Systems   Constitutional:  Negative for chills and fever.   Gastrointestinal:  Positive for abdominal pain (chronic). Negative for vomiting.   Neurological:  Negative for seizures and syncope.     Objective:     Vital Signs (Most Recent):  Temp: 97.8 °F (36.6 °C) (07/30/24 1130)  Pulse: 75 (07/30/24 1235)  Resp: 18 (07/30/24 1235)  BP: (!) 150/67 (07/30/24 1235)  SpO2: 98 % (07/30/24 1235) Vital Signs (24h Range):  Temp:  [97.5 °F (36.4 °C)-98.3 °F (36.8 °C)] 97.8 °F (36.6 °C)  Pulse:  [70-79] 75  Resp:  [17-19] 18  SpO2:  [97 %-99 %] 98 %  BP: (123-159)/(58-76) 150/67        There is no height or weight on file to calculate BMI.  No intake or output data in the 24 hours ending 07/30/24 1304      Physical Exam  Vitals and nursing note reviewed.   Constitutional:       General: She is not in acute distress.     Appearance: She is well-developed. She is not toxic-appearing or diaphoretic.      Interventions: She is not intubated.  Pulmonary:      Effort: Pulmonary effort is normal. No accessory muscle usage or respiratory distress. She is not intubated.   Neurological:      Mental Status: She is alert and oriented to person, place, and time. Mental status is at baseline.      Motor: No seizure activity.   Psychiatric:         Attention and Perception: Attention normal.         Mood and Affect: Affect normal.         Behavior: Behavior is not agitated. Behavior is cooperative.             Significant Labs: All pertinent labs within the past 24 hours have been reviewed.  Recent Labs   Lab 07/27/24  2159 07/28/24  0206 07/29/24  0256 07/30/24  0521   * 122* 129* 127*   K 5.6* 4.7 4.7 5.1   CL 92* 94* 102 102   CO2 18* 19* 16* 18*   BUN 51* 46* 39* 32*   CREATININE 4.0* 3.5* 2.5* 2.0*   CALCIUM 9.3 8.8 9.0 8.7   PROT 6.7  --  6.0 5.5*   BILITOT 4.2*  --  4.1* 4.0*   ALKPHOS 422*  --  408* 371*   ALT 39  --  45* 40    AST 78*  --  98* 71*         Significant Imaging: I have reviewed all pertinent imaging results/findings within the past 24 hours.

## 2024-07-30 NOTE — ASSESSMENT & PLAN NOTE
Ascites  Holding home furosemide and spironolactone. Has previously had difficulty getting therapeutic paracentesis. Reports chronic abdominal pain thought to be due to ascites, with pain being worse lately, but abdomen is not distended, has been much bigger in the past. Giving oxycodone prn. IR could not find a pocket of fluid this time either.

## 2024-07-30 NOTE — PROGRESS NOTES
Initial US imaging of the abdomen was performed. a safe percutaneous window could not be identified. Paracentesis not completed.     Diana Hewitt PA-C  Interventional Radiology  [phone]

## 2024-07-30 NOTE — PROGRESS NOTES
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 (on chronic immunosuppression with tacrolimus), cirrhosis of transplant liver with portal hypertension, ascites, hepatic encephalopathy, Arnold-Chiari malformation, migraines, history of craniotomy, syndrome of inappropriate ADH (SIADH) with chronic hyponatremia, hypertension, left bundle branch block, mitral valve prolapse, mitral regurgitation, chronic kidney disease stage 3, kidney angiolipoma, essential tremor, esophageal stenosis on 12/22/2023, 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 She lives in [place], [place]. She is . Her primary care physician is Dr. David Lorenzo. Her hepatologist is Dr. Jaya Nielsen.   She saw Dr. Nielsen in clinic on 6/17/2024. Her furosemide dose was increased from 20 mg to 40 mg and her spironolactone dose was increased from 50 mg to 100 mg due to ascites, which was difficult to treat with paracentesis due to being unable to find a large enough pocket to drain.    She had labs done on 7/25/2024 that showed acute on chronic hyponatremia (sodium 125 mmol/L), hyperkalemia (potassium 5.8 mmol/L), acute kidney injury (BUN 44 mg/dL and creatinine 5.2 mg/dL, from 18 and 1.3 on 7/9/2024),  elevated ammonia (200 umol/L).   She was advised to go to the hospital. She presented to [facility] Emergency Department on 7/27/2024. She had gradually worsening confusion, generalized weakness, and decreased oral intake for the last couple of weeks. She had dizziness for the past week. She left without treatment.    [facility] She had intermittent confusion. Labs showed worsened sodium (119), slightly improved potassium (5.6), worsened BUN (51), improved creatinine (4.0), and normal ammonia (49). Due to her history of liver transplant, her case was discussed with Hepatology at [facility]. She was given normal saline and sodium zirconium cyclosilicate (Lokelma). Sodium improved to 122, hyperkalemia resolved, BUN and creatinine improved. She was transferred to [facility] on 7/28/2024 and admitted to Hospital Medicine Team L.     Overview/Hospital Course:  Hepatology was consulted as was planned prior to transfer. She was put on normal saline at 100 mL/hr. Sodium improved to 129 mmol/L by 7/29/2024 and acute kidney injury improved. Hepatology recommended stopping IV fluids and getting outpatient labs prior to resuming diuretics at the previous doses before they were increased. IV fluids were stopped. BUN and creatinine continued to improve but sodium decreased a little to 127. Interventional Radiology was unable to find a pocket of fluid for therapeutic paracentesis, a problem she had in the past.     Interval History: Will monitor sodium levels another night. Likely can go home tomorrow.    Review of Systems   Constitutional:  Negative for chills and fever.   Gastrointestinal:  Positive for abdominal pain (chronic). Negative for vomiting.   Neurological:  Negative for seizures and syncope.     Objective:     Vital Signs (Most Recent):  Temp: 97.8 °F (36.6 °C) (07/30/24 1130)  Pulse: 75 (07/30/24  1235)  Resp: 18 (07/30/24 1235)  BP: (!) 150/67 (07/30/24 1235)  SpO2: 98 % (07/30/24 1235) Vital Signs (24h Range):  Temp:  [97.5 °F (36.4 °C)-98.3 °F (36.8 °C)] 97.8 °F (36.6 °C)  Pulse:  [70-79] 75  Resp:  [17-19] 18  SpO2:  [97 %-99 %] 98 %  BP: (123-159)/(58-76) 150/67        There is no height or weight on file to calculate BMI.  No intake or output data in the 24 hours ending 07/30/24 1304      Physical Exam  Vitals and nursing note reviewed.   Constitutional:       General: She is not in acute distress.     Appearance: She is well-developed. She is not toxic-appearing or diaphoretic.      Interventions: She is not intubated.  Pulmonary:      Effort: Pulmonary effort is normal. No accessory muscle usage or respiratory distress. She is not intubated.   Neurological:      Mental Status: She is alert and oriented to person, place, and time. Mental status is at baseline.      Motor: No seizure activity.   Psychiatric:         Attention and Perception: Attention normal.         Mood and Affect: Affect normal.         Behavior: Behavior is not agitated. Behavior is cooperative.             Significant Labs: All pertinent labs within the past 24 hours have been reviewed.  Recent Labs   Lab 07/27/24  2159 07/28/24  0206 07/29/24  0256 07/30/24  0521   * 122* 129* 127*   K 5.6* 4.7 4.7 5.1   CL 92* 94* 102 102   CO2 18* 19* 16* 18*   BUN 51* 46* 39* 32*   CREATININE 4.0* 3.5* 2.5* 2.0*   CALCIUM 9.3 8.8 9.0 8.7   PROT 6.7  --  6.0 5.5*   BILITOT 4.2*  --  4.1* 4.0*   ALKPHOS 422*  --  408* 371*   ALT 39  --  45* 40   AST 78*  --  98* 71*         Significant Imaging: I have reviewed all pertinent imaging results/findings within the past 24 hours.    Assessment/Plan:      * Hyponatremia  SIADH (syndrome of inappropriate ADH production)   Acute on chronic due to volume depletion due to diuretic dose increase recently along with poor oral intake. Gave normal saline with significant improvement. Holding IV fluids.  Monitor sodium level.     Acute kidney injury superimposed on chronic kidney disease  Improving. Due to volume depletion due to diuresis and poor oral fluid intake. Monitor.     Intractable chronic migraine without aura and without status migrainosus  Continue home prochlorperazine.       Hepatic encephalopathy  Continue home lactulose.      Cirrhosis of liver with ascites  Ascites  Holding home furosemide and spironolactone. Has previously had difficulty getting therapeutic paracentesis. Reports chronic abdominal pain thought to be due to ascites, with pain being worse lately, but abdomen is not distended, has been much bigger in the past. Giving oxycodone prn. IR could not find a pocket of fluid this time either.    Chronic constipation  Give lactulose, which she takes for hepatic encephalopathy.      Essential hypertension, dx 2010  Chronic. She takes ramipril. Monitor BP.     Hypothyroidism  Continue home levothyroxine.      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  Chronic rejection of liver transplant   Long-term use of immunosuppressant medication   Transferred here for Hepatology management. Continue home tacrolimus.      VTE Risk Mitigation (From admission, onward)      None            Discharge Planning   MARILEE: 7/31/2024     Code Status: Full Code   Is the patient medically ready for discharge?:     Reason for patient still in hospital (select all that apply): Patient trending condition and Laboratory test  Discharge Plan A: Home with family                  Salinas Mullen MD  Department of Hospital Medicine   [facility] ([place])

## 2024-07-31 ENCOUNTER — TELEPHONE (OUTPATIENT)
Dept: HEPATOLOGY | Facility: CLINIC | Age: 54
End: 2024-07-31
Payer: MEDICARE

## 2024-07-31 VITALS
SYSTOLIC BLOOD PRESSURE: 137 MMHG | DIASTOLIC BLOOD PRESSURE: 63 MMHG | TEMPERATURE: 98 F | HEART RATE: 73 BPM | OXYGEN SATURATION: 98 % | RESPIRATION RATE: 18 BRPM

## 2024-07-31 DIAGNOSIS — Z94.4 LIVER REPLACED BY TRANSPLANT: Primary | ICD-10-CM

## 2024-07-31 LAB
ALBUMIN SERPL BCP-MCNC: 2.5 G/DL (ref 3.5–5.2)
ALP SERPL-CCNC: 362 U/L (ref 55–135)
ALT SERPL W/O P-5'-P-CCNC: 36 U/L (ref 10–44)
ANION GAP SERPL CALC-SCNC: 7 MMOL/L (ref 8–16)
AST SERPL-CCNC: 57 U/L (ref 10–40)
BILIRUB SERPL-MCNC: 4.4 MG/DL (ref 0.1–1)
BUN SERPL-MCNC: 34 MG/DL (ref 6–20)
CALCIUM SERPL-MCNC: 8.9 MG/DL (ref 8.7–10.5)
CHLORIDE SERPL-SCNC: 97 MMOL/L (ref 95–110)
CO2 SERPL-SCNC: 25 MMOL/L (ref 23–29)
CREAT SERPL-MCNC: 2.1 MG/DL (ref 0.5–1.4)
EST. GFR  (NO RACE VARIABLE): 27.1 ML/MIN/1.73 M^2
GLUCOSE SERPL-MCNC: 88 MG/DL (ref 70–110)
POTASSIUM SERPL-SCNC: 4.7 MMOL/L (ref 3.5–5.1)
PROT SERPL-MCNC: 5.9 G/DL (ref 6–8.4)
SODIUM SERPL-SCNC: 129 MMOL/L (ref 136–145)
TACROLIMUS BLD-MCNC: 5.9 NG/ML (ref 5–15)

## 2024-07-31 PROCEDURE — 80197 ASSAY OF TACROLIMUS: CPT | Performed by: HOSPITALIST

## 2024-07-31 PROCEDURE — 36415 COLL VENOUS BLD VENIPUNCTURE: CPT | Performed by: HOSPITALIST

## 2024-07-31 PROCEDURE — 80053 COMPREHEN METABOLIC PANEL: CPT | Performed by: HOSPITALIST

## 2024-07-31 PROCEDURE — 63600175 PHARM REV CODE 636 W HCPCS: Performed by: HOSPITALIST

## 2024-07-31 PROCEDURE — 25000003 PHARM REV CODE 250: Performed by: INTERNAL MEDICINE

## 2024-07-31 PROCEDURE — 25000003 PHARM REV CODE 250: Performed by: HOSPITALIST

## 2024-07-31 RX ORDER — PANTOPRAZOLE SODIUM 40 MG/1
40 TABLET, DELAYED RELEASE ORAL DAILY
Status: DISCONTINUED | OUTPATIENT
Start: 2024-07-31 | End: 2024-07-31 | Stop reason: HOSPADM

## 2024-07-31 RX ORDER — FUROSEMIDE 40 MG/1
20 TABLET ORAL DAILY
Start: 2024-07-31 | End: 2025-07-31

## 2024-07-31 RX ORDER — SPIRONOLACTONE 50 MG/1
50 TABLET, FILM COATED ORAL DAILY
Start: 2024-07-31 | End: 2024-07-31

## 2024-07-31 RX ORDER — FUROSEMIDE 40 MG/1
20 TABLET ORAL DAILY
Start: 2024-07-31 | End: 2024-07-31

## 2024-07-31 RX ORDER — SPIRONOLACTONE 50 MG/1
50 TABLET, FILM COATED ORAL DAILY
Start: 2024-07-31 | End: 2025-07-31

## 2024-07-31 RX ADMIN — SODIUM BICARBONATE 650 MG TABLET 1300 MG: at 09:07

## 2024-07-31 RX ADMIN — OXYCODONE HYDROCHLORIDE 10 MG: 10 TABLET ORAL at 11:07

## 2024-07-31 RX ADMIN — LACTULOSE 15 G: 20 SOLUTION ORAL at 09:07

## 2024-07-31 RX ADMIN — VENLAFAXINE HYDROCHLORIDE 150 MG: 75 CAPSULE, EXTENDED RELEASE ORAL at 09:07

## 2024-07-31 RX ADMIN — CLONAZEPAM 0.5 MG: 0.5 TABLET ORAL at 01:07

## 2024-07-31 RX ADMIN — LEVOTHYROXINE SODIUM 75 MCG: 75 TABLET ORAL at 04:07

## 2024-07-31 RX ADMIN — Medication 400 MG: at 09:07

## 2024-07-31 RX ADMIN — OXYCODONE HYDROCHLORIDE 10 MG: 10 TABLET ORAL at 04:07

## 2024-07-31 RX ADMIN — OXYCODONE 5 MG: 5 TABLET ORAL at 07:07

## 2024-07-31 RX ADMIN — TACROLIMUS 0.5 MG: 0.5 CAPSULE ORAL at 07:07

## 2024-07-31 RX ADMIN — PANTOPRAZOLE SODIUM 40 MG: 40 TABLET, DELAYED RELEASE ORAL at 09:07

## 2024-07-31 RX ADMIN — OXYCODONE 5 MG: 5 TABLET ORAL at 01:07

## 2024-07-31 RX ADMIN — CALCIUM CARBONATE (ANTACID) CHEW TAB 500 MG 1500 MG: 500 CHEW TAB at 04:07

## 2024-07-31 NOTE — DISCHARGE SUMMARY
Swapnil Oscar - Stepdown [facility] ([place] [address])  [facility] Medicine  Discharge Summary      Patient Name: Elda Hernandez  MRN: [id number]  [facility]: [id number]  Patient Class: IP- Inpatient  Admission Date: 7/28/2024  Hospital Length of Stay: 3 days  Discharge Date and Time: 7/31/2024  3:49 PM  Attending Physician: Salinas Mullen MD   Discharging Provider: Salinas Mullen MD  Primary Care Provider: David Lorenzo MD  [facility] Medicine Team: [facility] Salinas Mullen MD  Primary Care Team: [facility]    HPI:   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 on 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 (on chronic immunosuppression with tacrolimus), cirrhosis of transplant liver with portal hypertension, ascites, hepatic encephalopathy, Arnold-Chiari malformation, migraines, history of craniotomy, syndrome of inappropriate ADH (SIADH) with chronic hyponatremia, hypertension, left bundle branch block, mitral valve prolapse, mitral regurgitation, chronic kidney disease stage 3, kidney angiolipoma, essential tremor, esophageal stenosis on 12/22/2023, 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 She lives in [place], [place]. She is . Her primary care physician is Dr. David Lorenzo. Her hepatologist is Dr. Jaya Nielsen.   She saw Dr. Nielsen in clinic on 6/17/2024. Her furosemide dose was increased from 20 mg to 40 mg and her spironolactone dose was increased from 50 mg to 100 mg due to ascites, which was difficult to treat with paracentesis due to being unable to find a large enough pocket to drain.    She had labs done on 7/25/2024 that showed acute on chronic  hyponatremia (sodium 125 mmol/L), hyperkalemia (potassium 5.8 mmol/L), acute kidney injury (BUN 44 mg/dL and creatinine 5.2 mg/dL, from 18 and 1.3 on 7/9/2024), elevated ammonia (200 umol/L).   She was advised to go to the hospital. She presented to [facility] Emergency Department on 7/27/2024. She had gradually worsening confusion, generalized weakness, and decreased oral intake for the last couple of weeks. She had dizziness for the past week. She left without treatment.    She went to [facility] Emergency Department. She had intermittent confusion. Labs showed worsened sodium (119), slightly improved potassium (5.6), worsened BUN (51), improved creatinine (4.0), and normal ammonia (49). Due to her history of liver transplant, her case was discussed with Hepatology at [facility]. She was given normal saline and sodium zirconium cyclosilicate (Lokelma). Sodium improved to 122, hyperkalemia resolved, BUN and creatinine improved. She was transferred to [facility] on 7/28/2024 and admitted to Hospital Medicine Team L.     Hospital Course:   Hepatology was consulted as was planned prior to transfer. She was put on normal saline at 100 mL/hr. Sodium improved to 129 mmol/L by 7/29/2024 and acute kidney injury improved. Hepatology recommended stopping IV fluids and resuming diuretics at the previous doses before they were increased. IV fluids were stopped. BUN and creatinine continued to improve but sodium decreased a little to 127. Interventional Radiology was unable to find a pocket of fluid for therapeutic paracentesis, a problem she had in the past. She was discharged home. Sodium was 129 at discharge. She was advised to hold furosemide and spironolactone until she gets outpatient labs and follows up with her hepatologist.      Goals of Care Treatment Preferences:  Code Status: Full Code      Consults:   Consults (From admission, onward)           Status Ordering Provider     Inpatient consult to Hepatology  Once        Provider:  (Not yet assigned)    JUVENTINO Bunn            No new Assessment & Plan notes have been filed under this hospital service since the last note was generated.  Service: Hospital Medicine    Final Active Diagnoses:    Diagnosis Date Noted POA    PRINCIPAL PROBLEM:  Hyponatremia [E87.1] 03/04/2023 Yes     Chronic    Acute kidney injury superimposed on chronic kidney disease [N17.9, N18.9] 07/28/2024 Yes    Intractable chronic migraine without aura and without status migrainosus [G43.719] 11/14/2023 Yes     Chronic    Hepatic encephalopathy [K76.82] 03/01/2023 Yes     Chronic    Cirrhosis of liver with ascites [K74.60, R18.8] 03/25/2022 Yes     Chronic    Ascites [R18.8] 10/29/2020 Yes     Chronic    Chronic constipation [K59.09] 10/01/2018 Yes     Chronic    Long-term use of immunosuppressant medication [Z79.60]  Not Applicable     Chronic    Chronic rejection of liver transplant [T86.41] 05/18/2015 Yes     Chronic    Essential hypertension, dx 2010 [I10] 04/26/2015 Yes     Chronic    Hypothyroidism [E03.9] 10/12/2013 Yes     Chronic    SIADH (syndrome of inappropriate ADH production) [E22.2] 10/12/2013 Yes     Chronic    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 [Z94.4] 10/12/2013 Not Applicable     Chronic      Problems Resolved During this Admission:       Discharged Condition: good    Disposition: Home or Self Care    Follow Up:   Follow-up Information       Marta HOWARD) - Lab Follow up on 8/10/2024.    Specialty: Lab  Why: 8:40 AM  Contact information:  [address] [facility] [address] [phone]                         Patient Instructions:      COMPREHENSIVE METABOLIC PANEL   Standing Status: Future Standing Exp. Date: 10/29/25     Diet Adult Regular     Notify your health care provider if you experience any of the following:  increased confusion or weakness     Notify your  health care provider if you experience any of the following:  persistent dizziness, light-headedness, or visual disturbances     Activity as tolerated       Significant Diagnostic Studies:   Recent Labs   Lab 07/29/24  0256 07/30/24  0521 07/30/24  1437 07/31/24  0614   * 127* 131* 129*   K 4.7 5.1  --  4.7    102  --  97   CO2 16* 18*  --  25   BUN 39* 32*  --  34*   CREATININE 2.5* 2.0*  --  2.1*   CALCIUM 9.0 8.7  --  8.9   PROT 6.0 5.5*  --  5.9*   BILITOT 4.1* 4.0*  --  4.4*   ALKPHOS 408* 371*  --  362*   ALT 45* 40  --  36   AST 98* 71*  --  57*       Medications:  Reconciled Home Medications:      Medication List        CHANGE how you take these medications      furosemide 40 MG tablet  Commonly known as: LASIX  Take 0.5 tablets (20 mg total) by mouth once daily. Hold until you talk to your hepatologist.  What changed: additional instructions     spironolactone 50 MG tablet  Commonly known as: ALDACTONE  Take 1 tablet (50 mg total) by mouth once daily. Hold until you talk to your hepatologist.  What changed: additional instructions            CONTINUE taking these medications      clonazePAM 0.5 MG tablet  Commonly known as: KlonoPIN  Take 1 tablet (0.5 mg total) by mouth 2 (two) times daily.     CONSTULOSE 10 gram/15 mL solution  Generic drug: lactulose  Take 15 mLs (10 g total) by mouth 3 (three) times daily.     fluorometholone 0.1% 0.1 % Drps  Commonly known as: FML  Place 2 drops into the left eye 2 (two) times daily.     levothyroxine 75 MCG tablet  Commonly known as: SYNTHROID  Take 1 tablet (75 mcg total) by mouth once daily.     magnesium oxide 400 mg (241.3 mg magnesium) tablet  Commonly known as: MAG-OX  TAKE 1 TABLET(400 MG) BY MOUTH TWICE DAILY     pantoprazole 40 MG tablet  Commonly known as: PROTONIX  Take 40 mg by mouth once daily.     pravastatin 20 MG tablet  Commonly known as: PRAVACHOL  Take 1 tablet (20 mg total) by mouth once daily. Need OFFICE VISIT before next refill, last  seen 9/2023. This will be the LAST Rx if visit is not made.     prochlorperazine 10 MG tablet  Commonly known as: COMPAZINE  Take 1 tablet (10 mg total) by mouth every 6 (six) hours as needed (migraine or nausea).     ramipriL 5 MG capsule  Commonly known as: ALTACE  Take 1 capsule (5 mg total) by mouth once daily.     tacrolimus 0.5 MG Cap  Commonly known as: PROGRAF  Take 1 capsule (0.5 mg total) by mouth every 12 (twelve) hours.     venlafaxine 150 MG Cp24  Commonly known as: EFFEXOR-XR  Take 1 capsule (150 mg total) by mouth once daily.              Indwelling Lines/Drains at time of discharge: None      Time spent on the discharge of patient: 35 minutes         Salinas Mullen MD  Department of Hospital Medicine  [facility] - Stepdown Flex ([place]

## 2024-07-31 NOTE — PLAN OF CARE
Alert and oriented x 4. Speech is clear. On room air. Vs stable. Safety measures in place. To be discharged today. Bed in low position. Call light in reach.      Problem: Adult Inpatient Plan of Care  Goal: Plan of Care Review  Outcome: Progressing  Goal: Patient-Specific Goal (Individualized)  Outcome: Progressing  Goal: Absence of Hospital-Acquired Illness or Injury  Outcome: Met  Goal: Optimal Comfort and Wellbeing  Outcome: Progressing  Goal: Readiness for Transition of Care  Outcome: Progressing     Problem: Acute Kidney Injury/Impairment  Goal: Fluid and Electrolyte Balance  Outcome: Progressing  Goal: Effective Renal Function  Outcome: Progressing

## 2024-07-31 NOTE — PLAN OF CARE
Swapnil Oscar - Stepdown Flex ([place]  Discharge Final Note    Primary Care Provider: David Lorenzo MD    Expected Discharge Date: 7/31/2024  Discharge Plan A and Plan B have been determined by review of patient's clinical status, future medical and therapeutic needs, and coverage/benefits for post-acute care in coordination with multidisciplinary team members.     Final Discharge Note (most recent)       Final Note - 07/31/24 1625          Final Note    Assessment Type Final Discharge Note     Anticipated Discharge Disposition Planned Readmission - Home or self care     What phone number can be called within the next 1-3 days to see how you are doing after discharge? [phone]   Kody Hernandez (Spouse)    Hospital Resources/Appts/Education Provided Appointments scheduled and added to AVS     Offered [facility] -- Bedside Delivery? Yes     Referral to Outpatient Case Management complete? n/a   declined, patient resides in [place]    Referral to / orders for Home Health Complete? n/a     30 day supply of medicines given at discharge, if documented non-compliance / non-adherence? Yes     Any social issues identified prior to discharge? Yes     Did you assess the readiness or willingness of the family or caregiver to support self management of care? Yes     Hospital Follow Up  Appt(s) scheduled? Yes   Follow up on 8/10/2024  Instructions: 8:40 AM    Discharge plans and expectations educations in teach back method with documentation complete? Yes   Patients assigned nurse instructed patient on S/sx of low sodium levels       Post-Acute Status    Post-Acute Authorization Other     Coverage [employer] MANAGED MEDICARE - [employer] MEDICARE PPO -     Other Status No Post-Acute Service Needs     Discharge Delays None known at this time                     Important Message from Medicare  Important Message from Medicare regarding Discharge Appeal Rights: Given to patient/caregiver, Explained to patient/caregiver, Signed/date  by patient/caregiver     Date IMM was signed: 07/30/24  Time IMM was signed: 1504    Contact Info       [facility] ([place]) - Lab   Specialty: Lab    [address] [facility] [place] [address]   Phone: [phone]       Next Steps: Follow up on 8/10/2024    Instructions: 8:40 AM            CM met with patient and spoke with patients spouse and discussed discharge plans, patient to [place] home with no needs and follow up appointment[s] scheduled.   Transportation provided by spouse via private vehicle.      Mary Barfield RN  Case Management  [facility]  [phone]

## 2024-07-31 NOTE — PLAN OF CARE
7/30 PM Medicated x 2 with oxycodone due to abd pain, medicated with klonopin due to restlessness/anxiety unable to sleep, possible discharge 7/31  States is having multiple BM per day. Pt. Unable to sleep at night and takes pain med frequently, states she doesn't think pain med is keeping her awake., C/O reflux/acid indigestion this am, - notified  On call pt. Is requesting protonix like she takes at home.  PRN antacids given po               Problem: Adult Inpatient Plan of Care  Goal: Plan of Care Review  Outcome: Progressing  Goal: Patient-Specific Goal (Individualized)  Outcome: Progressing  Goal: Absence of Hospital-Acquired Illness or Injury  Outcome: Progressing  Goal: Optimal Comfort and Wellbeing  Outcome: Progressing  Goal: Readiness for Transition of Care  Outcome: Progressing     Problem: Acute Kidney Injury/Impairment  Goal: Fluid and Electrolyte Balance  Outcome: Progressing  Goal: Improved Oral Intake  Outcome: Progressing  Goal: Effective Renal Function  Outcome: Progressing     Problem: Infection  Goal: Absence of Infection Signs and Symptoms  Outcome: Progressing

## 2024-07-31 NOTE — PLAN OF CARE
Discharge Plan A and Plan B have been determined by review of patient's clinical status, future medical and therapeutic needs, and coverage/benefits for post-acute care in coordination with multidisciplinary team members.    07/31/24 1045   Post-Acute Status   Post-Acute Authorization Other   Coverage [employer] MANAGED MEDICARE - [employer] MEDICARE PPO -   Other Status No Post-Acute Service Needs   Hospital Resources/Appts/Education Provided Appointments scheduled and added to AVS   Discharge Delays None known at this time   Discharge Plan   Discharge Plan A Home with family   Discharge Plan B Home     CM met with patient and left VM on patients spouse contact number   to discuss discharge planning.  Patients plan is to  dc home and no needs   MARILEE:  7/31/24    0923 am  Patient requested a bowl of grits  and grape jelly.  CM  spoke with dietary # [id number] and will deliver the requested items        Mary Barfield RN  Case Management  [facility]  [phone]

## 2024-08-03 ENCOUNTER — LAB VISIT (OUTPATIENT)
Dept: LAB | Facility: HOSPITAL | Age: 54
End: 2024-08-03
Attending: INTERNAL MEDICINE
Payer: MEDICARE

## 2024-08-03 DIAGNOSIS — E83.42 HYPOMAGNESEMIA: ICD-10-CM

## 2024-08-03 DIAGNOSIS — Z94.4 LIVER REPLACED BY TRANSPLANT: ICD-10-CM

## 2024-08-03 LAB
ALBUMIN SERPL BCP-MCNC: 2.6 G/DL (ref 3.5–5.2)
ALP SERPL-CCNC: 421 U/L (ref 55–135)
ALT SERPL W/O P-5'-P-CCNC: 38 U/L (ref 10–44)
ANION GAP SERPL CALC-SCNC: 10 MMOL/L (ref 8–16)
AST SERPL-CCNC: 65 U/L (ref 10–40)
BASOPHILS # BLD AUTO: 0.03 K/UL (ref 0–0.2)
BASOPHILS NFR BLD: 0.8 % (ref 0–1.9)
BILIRUB SERPL-MCNC: 3.8 MG/DL (ref 0.1–1)
BUN SERPL-MCNC: 34 MG/DL (ref 6–20)
CALCIUM SERPL-MCNC: 9.2 MG/DL (ref 8.7–10.5)
CHLORIDE SERPL-SCNC: 103 MMOL/L (ref 95–110)
CO2 SERPL-SCNC: 19 MMOL/L (ref 23–29)
CREAT SERPL-MCNC: 2 MG/DL (ref 0.5–1.4)
DIFFERENTIAL METHOD BLD: ABNORMAL
EOSINOPHIL # BLD AUTO: 0.1 K/UL (ref 0–0.5)
EOSINOPHIL NFR BLD: 3.6 % (ref 0–8)
ERYTHROCYTE [DISTWIDTH] IN BLOOD BY AUTOMATED COUNT: 14.6 % (ref 11.5–14.5)
EST. GFR  (NO RACE VARIABLE): 29 ML/MIN/1.73 M^2
GLUCOSE SERPL-MCNC: 82 MG/DL (ref 70–110)
HCT VFR BLD AUTO: 26.3 % (ref 37–48.5)
HGB BLD-MCNC: 8.8 G/DL (ref 12–16)
IMM GRANULOCYTES # BLD AUTO: 0.01 K/UL (ref 0–0.04)
IMM GRANULOCYTES NFR BLD AUTO: 0.3 % (ref 0–0.5)
INR PPP: 1.3 (ref 0.8–1.2)
LYMPHOCYTES # BLD AUTO: 0.6 K/UL (ref 1–4.8)
LYMPHOCYTES NFR BLD: 17.2 % (ref 18–48)
MAGNESIUM SERPL-MCNC: 2.4 MG/DL (ref 1.6–2.6)
MCH RBC QN AUTO: 29.7 PG (ref 27–31)
MCHC RBC AUTO-ENTMCNC: 33.5 G/DL (ref 32–36)
MCV RBC AUTO: 89 FL (ref 82–98)
MONOCYTES # BLD AUTO: 0.6 K/UL (ref 0.3–1)
MONOCYTES NFR BLD: 15.3 % (ref 4–15)
NEUTROPHILS # BLD AUTO: 2.3 K/UL (ref 1.8–7.7)
NEUTROPHILS NFR BLD: 62.8 % (ref 38–73)
NRBC BLD-RTO: 0 /100 WBC
PLATELET # BLD AUTO: 74 K/UL (ref 150–450)
PMV BLD AUTO: 11.8 FL (ref 9.2–12.9)
POTASSIUM SERPL-SCNC: 4.5 MMOL/L (ref 3.5–5.1)
PROT SERPL-MCNC: 6.1 G/DL (ref 6–8.4)
PROTHROMBIN TIME: 13.5 SEC (ref 9–12.5)
RBC # BLD AUTO: 2.96 M/UL (ref 4–5.4)
SODIUM SERPL-SCNC: 132 MMOL/L (ref 136–145)
WBC # BLD AUTO: 3.6 K/UL (ref 3.9–12.7)

## 2024-08-03 PROCEDURE — 80053 COMPREHEN METABOLIC PANEL: CPT | Performed by: INTERNAL MEDICINE

## 2024-08-03 PROCEDURE — 83735 ASSAY OF MAGNESIUM: CPT | Performed by: INTERNAL MEDICINE

## 2024-08-03 PROCEDURE — 85610 PROTHROMBIN TIME: CPT | Performed by: INTERNAL MEDICINE

## 2024-08-03 PROCEDURE — 85025 COMPLETE CBC W/AUTO DIFF WBC: CPT | Performed by: INTERNAL MEDICINE

## 2024-08-03 PROCEDURE — 36415 COLL VENOUS BLD VENIPUNCTURE: CPT | Performed by: INTERNAL MEDICINE

## 2024-08-05 ENCOUNTER — TELEPHONE (OUTPATIENT)
Dept: TRANSPLANT | Facility: CLINIC | Age: 54
End: 2024-08-05
Payer: MEDICARE

## 2024-08-05 DIAGNOSIS — Z94.4 LIVER REPLACED BY TRANSPLANT: Primary | ICD-10-CM

## 2024-08-05 DIAGNOSIS — C22.0 HEPATOCELLULAR CARCINOMA: ICD-10-CM

## 2024-08-05 LAB — TACROLIMUS BLD-MCNC: 7.4 NG/ML (ref 5–15)

## 2024-08-06 DIAGNOSIS — G47.01 INSOMNIA DUE TO MEDICAL CONDITION: ICD-10-CM

## 2024-08-07 ENCOUNTER — PATIENT MESSAGE (OUTPATIENT)
Dept: FAMILY MEDICINE | Facility: CLINIC | Age: 54
End: 2024-08-07
Payer: MEDICARE

## 2024-08-07 ENCOUNTER — PATIENT MESSAGE (OUTPATIENT)
Dept: TRANSPLANT | Facility: CLINIC | Age: 54
End: 2024-08-07
Payer: MEDICARE

## 2024-08-09 RX ORDER — CLONAZEPAM 0.5 MG/1
0.25 TABLET ORAL 2 TIMES DAILY
Qty: 30 TABLET | Refills: 0 | Status: SHIPPED | OUTPATIENT
Start: 2024-08-09 | End: 2024-09-08

## 2024-08-10 ENCOUNTER — HOSPITAL ENCOUNTER (OUTPATIENT)
Dept: RADIOLOGY | Facility: HOSPITAL | Age: 54
Discharge: HOME | End: 2024-08-10
Attending: INTERNAL MEDICINE
Payer: MEDICARE

## 2024-08-10 ENCOUNTER — LAB VISIT (OUTPATIENT)
Dept: LAB | Facility: HOSPITAL | Age: 54
End: 2024-08-10
Attending: INTERNAL MEDICINE
Payer: MEDICARE

## 2024-08-10 DIAGNOSIS — Z94.4 LIVER REPLACED BY TRANSPLANT: ICD-10-CM

## 2024-08-10 DIAGNOSIS — C22.0 HEPATOCELLULAR CARCINOMA: ICD-10-CM

## 2024-08-10 LAB
AFP SERPL-MCNC: <2 NG/ML (ref 0–8.4)
ALBUMIN SERPL BCP-MCNC: 2.3 G/DL (ref 3.5–5.2)
ALP SERPL-CCNC: 389 U/L (ref 55–135)
ALT SERPL W/O P-5'-P-CCNC: 35 U/L (ref 10–44)
ANION GAP SERPL CALC-SCNC: 8 MMOL/L (ref 8–16)
AST SERPL-CCNC: 67 U/L (ref 10–40)
BASOPHILS # BLD AUTO: 0.02 K/UL (ref 0–0.2)
BASOPHILS NFR BLD: 0.6 % (ref 0–1.9)
BILIRUB SERPL-MCNC: 2.7 MG/DL (ref 0.1–1)
BUN SERPL-MCNC: 26 MG/DL (ref 6–20)
CALCIUM SERPL-MCNC: 8.1 MG/DL (ref 8.7–10.5)
CHLORIDE SERPL-SCNC: 101 MMOL/L (ref 95–110)
CO2 SERPL-SCNC: 17 MMOL/L (ref 23–29)
CREAT SERPL-MCNC: 2.1 MG/DL (ref 0.5–1.4)
DIFFERENTIAL METHOD BLD: ABNORMAL
EOSINOPHIL # BLD AUTO: 0.1 K/UL (ref 0–0.5)
EOSINOPHIL NFR BLD: 4.2 % (ref 0–8)
ERYTHROCYTE [DISTWIDTH] IN BLOOD BY AUTOMATED COUNT: 15.9 % (ref 11.5–14.5)
EST. GFR  (NO RACE VARIABLE): 27.1 ML/MIN/1.73 M^2
GLUCOSE SERPL-MCNC: 82 MG/DL (ref 70–110)
HCT VFR BLD AUTO: 25.7 % (ref 37–48.5)
HGB BLD-MCNC: 8.4 G/DL (ref 12–16)
IMM GRANULOCYTES # BLD AUTO: 0.01 K/UL (ref 0–0.04)
IMM GRANULOCYTES NFR BLD AUTO: 0.3 % (ref 0–0.5)
INR PPP: 1.3 (ref 0.8–1.2)
LYMPHOCYTES # BLD AUTO: 0.7 K/UL (ref 1–4.8)
LYMPHOCYTES NFR BLD: 19.6 % (ref 18–48)
MCH RBC QN AUTO: 29.3 PG (ref 27–31)
MCHC RBC AUTO-ENTMCNC: 32.7 G/DL (ref 32–36)
MCV RBC AUTO: 90 FL (ref 82–98)
MONOCYTES # BLD AUTO: 0.3 K/UL (ref 0.3–1)
MONOCYTES NFR BLD: 9.6 % (ref 4–15)
NEUTROPHILS # BLD AUTO: 2.2 K/UL (ref 1.8–7.7)
NEUTROPHILS NFR BLD: 65.7 % (ref 38–73)
NRBC BLD-RTO: 0 /100 WBC
PLATELET # BLD AUTO: 67 K/UL (ref 150–450)
PMV BLD AUTO: 11.5 FL (ref 9.2–12.9)
POTASSIUM SERPL-SCNC: 4.5 MMOL/L (ref 3.5–5.1)
PROT SERPL-MCNC: 5.4 G/DL (ref 6–8.4)
PROTHROMBIN TIME: 13.6 SEC (ref 9–12.5)
RBC # BLD AUTO: 2.87 M/UL (ref 4–5.4)
SODIUM SERPL-SCNC: 126 MMOL/L (ref 136–145)
WBC # BLD AUTO: 3.32 K/UL (ref 3.9–12.7)

## 2024-08-10 PROCEDURE — 80197 ASSAY OF TACROLIMUS: CPT | Performed by: INTERNAL MEDICINE

## 2024-08-10 PROCEDURE — 36415 COLL VENOUS BLD VENIPUNCTURE: CPT | Mod: PO | Performed by: INTERNAL MEDICINE

## 2024-08-10 PROCEDURE — 85610 PROTHROMBIN TIME: CPT | Performed by: INTERNAL MEDICINE

## 2024-08-10 PROCEDURE — 82105 ALPHA-FETOPROTEIN SERUM: CPT | Performed by: INTERNAL MEDICINE

## 2024-08-10 PROCEDURE — 80053 COMPREHEN METABOLIC PANEL: CPT | Performed by: INTERNAL MEDICINE

## 2024-08-10 PROCEDURE — 85025 COMPLETE CBC W/AUTO DIFF WBC: CPT | Performed by: INTERNAL MEDICINE

## 2024-08-11 ENCOUNTER — PATIENT MESSAGE (OUTPATIENT)
Dept: FAMILY MEDICINE | Facility: CLINIC | Age: 54
End: 2024-08-11
Payer: MEDICARE

## 2024-08-11 LAB — TACROLIMUS BLD-MCNC: 9.1 NG/ML (ref 5–15)

## 2024-08-13 ENCOUNTER — TELEPHONE (OUTPATIENT)
Dept: TRANSPLANT | Facility: CLINIC | Age: 54
End: 2024-08-13
Payer: MEDICARE

## 2024-08-13 NOTE — TELEPHONE ENCOUNTER
Called patient's  to let him know about the orders below.  He stated she has not been on the diuretics since she was discharged.  He will put her on fluid restriction and take her on Thursday for labs.    ----- Message from Jaya Nielsen MD sent at 8/12/2024  7:18 AM CDT -----  Results reviewed. Can we fluid restrict < 1 L day and hold diuretics. Repeat labs on Thursday.

## 2024-08-15 ENCOUNTER — TELEPHONE (OUTPATIENT)
Dept: TRANSPLANT | Facility: CLINIC | Age: 54
End: 2024-08-15
Payer: MEDICARE

## 2024-08-15 ENCOUNTER — HOSPITAL ENCOUNTER (INPATIENT)
Facility: HOSPITAL | Age: 54
LOS: 3 days | Discharge: HOME | DRG: 640 | End: 2024-08-19
Attending: EMERGENCY MEDICINE | Admitting: INTERNAL MEDICINE
Payer: MEDICARE

## 2024-08-15 DIAGNOSIS — R41.82 ALTERED MENTAL STATUS: ICD-10-CM

## 2024-08-15 DIAGNOSIS — N18.9 ACUTE RENAL FAILURE SUPERIMPOSED ON CHRONIC KIDNEY DISEASE, UNSPECIFIED ACUTE RENAL FAILURE TYPE, UNSPECIFIED CKD STAGE: ICD-10-CM

## 2024-08-15 DIAGNOSIS — G93.41 ACUTE METABOLIC ENCEPHALOPATHY: ICD-10-CM

## 2024-08-15 DIAGNOSIS — E87.1 HYPONATREMIA: Primary | ICD-10-CM

## 2024-08-15 DIAGNOSIS — R07.9 CHEST PAIN: ICD-10-CM

## 2024-08-15 DIAGNOSIS — K76.82 HEPATIC ENCEPHALOPATHY: Chronic | ICD-10-CM

## 2024-08-15 DIAGNOSIS — N17.9 ACUTE RENAL FAILURE SUPERIMPOSED ON CHRONIC KIDNEY DISEASE, UNSPECIFIED ACUTE RENAL FAILURE TYPE, UNSPECIFIED CKD STAGE: ICD-10-CM

## 2024-08-15 LAB
ALBUMIN SERPL BCP-MCNC: 2.5 G/DL (ref 3.5–5.2)
ALP SERPL-CCNC: 390 U/L (ref 55–135)
ALT SERPL W/O P-5'-P-CCNC: 34 U/L (ref 10–44)
ANION GAP SERPL CALC-SCNC: 9 MMOL/L (ref 8–16)
APTT PPP: 53.2 SEC (ref 21–32)
AST SERPL-CCNC: 61 U/L (ref 10–40)
BACTERIA #/AREA URNS AUTO: NORMAL /HPF
BASOPHILS # BLD AUTO: 0.02 K/UL (ref 0–0.2)
BASOPHILS NFR BLD: 0.6 % (ref 0–1.9)
BILIRUB SERPL-MCNC: 3.9 MG/DL (ref 0.1–1)
BILIRUB UR QL STRIP: NEGATIVE
BUN SERPL-MCNC: 38 MG/DL (ref 6–20)
CALCIUM SERPL-MCNC: 8.8 MG/DL (ref 8.7–10.5)
CHLORIDE SERPL-SCNC: 99 MMOL/L (ref 95–110)
CLARITY UR REFRACT.AUTO: CLEAR
CO2 SERPL-SCNC: 18 MMOL/L (ref 23–29)
COLOR UR AUTO: YELLOW
CREAT SERPL-MCNC: 2.2 MG/DL (ref 0.5–1.4)
CREAT UR-MCNC: 55 MG/DL (ref 15–325)
DIFFERENTIAL METHOD BLD: ABNORMAL
EOSINOPHIL # BLD AUTO: 0.1 K/UL (ref 0–0.5)
EOSINOPHIL NFR BLD: 3.4 % (ref 0–8)
ERYTHROCYTE [DISTWIDTH] IN BLOOD BY AUTOMATED COUNT: 15.5 % (ref 11.5–14.5)
EST. GFR  (NO RACE VARIABLE): 25.7 ML/MIN/1.73 M^2
GLUCOSE SERPL-MCNC: 82 MG/DL (ref 70–110)
GLUCOSE UR QL STRIP: NEGATIVE
HCT VFR BLD AUTO: 23.6 % (ref 37–48.5)
HGB BLD-MCNC: 8.2 G/DL (ref 12–16)
HGB UR QL STRIP: NEGATIVE
HIV 1+2 AB+HIV1 P24 AG SERPL QL IA: NORMAL
IMM GRANULOCYTES # BLD AUTO: 0.01 K/UL (ref 0–0.04)
IMM GRANULOCYTES NFR BLD AUTO: 0.3 % (ref 0–0.5)
INR PPP: 1.9 (ref 0.8–1.2)
KETONES UR QL STRIP: NEGATIVE
LEUKOCYTE ESTERASE UR QL STRIP: ABNORMAL
LYMPHOCYTES # BLD AUTO: 0.6 K/UL (ref 1–4.8)
LYMPHOCYTES NFR BLD: 17.5 % (ref 18–48)
MCH RBC QN AUTO: 29.8 PG (ref 27–31)
MCHC RBC AUTO-ENTMCNC: 34.7 G/DL (ref 32–36)
MCV RBC AUTO: 86 FL (ref 82–98)
MICROSCOPIC COMMENT: NORMAL
MONOCYTES # BLD AUTO: 0.4 K/UL (ref 0.3–1)
MONOCYTES NFR BLD: 11.7 % (ref 4–15)
NEUTROPHILS # BLD AUTO: 2.3 K/UL (ref 1.8–7.7)
NEUTROPHILS NFR BLD: 66.5 % (ref 38–73)
NITRITE UR QL STRIP: NEGATIVE
NRBC BLD-RTO: 0 /100 WBC
OSMOLALITY SERPL: 282 MOSM/KG (ref 275–295)
OSMOLALITY UR: 248 MOSM/KG (ref 50–1200)
PH UR STRIP: 7 [PH] (ref 5–8)
PLATELET # BLD AUTO: 54 K/UL (ref 150–450)
PMV BLD AUTO: 13.4 FL (ref 9.2–12.9)
POTASSIUM SERPL-SCNC: 4.9 MMOL/L (ref 3.5–5.1)
PROT SERPL-MCNC: 5.8 G/DL (ref 6–8.4)
PROT UR QL STRIP: NEGATIVE
PROT UR-MCNC: <7 MG/DL (ref 0–15)
PROT/CREAT UR: NORMAL MG/G{CREAT} (ref 0–0.2)
PROTHROMBIN TIME: 20.4 SEC (ref 9–12.5)
RBC # BLD AUTO: 2.75 M/UL (ref 4–5.4)
RBC #/AREA URNS AUTO: 1 /HPF (ref 0–4)
SODIUM SERPL-SCNC: 126 MMOL/L (ref 136–145)
SODIUM UR-SCNC: 12 MMOL/L (ref 20–250)
SP GR UR STRIP: 1.01 (ref 1–1.03)
SQUAMOUS #/AREA URNS AUTO: 2 /HPF
URN SPEC COLLECT METH UR: ABNORMAL
WBC # BLD AUTO: 3.49 K/UL (ref 3.9–12.7)
WBC #/AREA URNS AUTO: 5 /HPF (ref 0–5)

## 2024-08-15 PROCEDURE — 96375 TX/PRO/DX INJ NEW DRUG ADDON: CPT

## 2024-08-15 PROCEDURE — 63600175 PHARM REV CODE 636 W HCPCS: Performed by: EMERGENCY MEDICINE

## 2024-08-15 PROCEDURE — 82962 GLUCOSE BLOOD TEST: CPT

## 2024-08-15 PROCEDURE — 85610 PROTHROMBIN TIME: CPT | Performed by: PHYSICIAN ASSISTANT

## 2024-08-15 PROCEDURE — G0378 HOSPITAL OBSERVATION PER HR: HCPCS

## 2024-08-15 PROCEDURE — 87389 HIV-1 AG W/HIV-1&-2 AB AG IA: CPT | Performed by: PHYSICIAN ASSISTANT

## 2024-08-15 PROCEDURE — 93005 ELECTROCARDIOGRAM TRACING: CPT

## 2024-08-15 PROCEDURE — 99285 EMERGENCY DEPT VISIT HI MDM: CPT | Mod: 25

## 2024-08-15 PROCEDURE — 93010 ELECTROCARDIOGRAM REPORT: CPT | Mod: ,,, | Performed by: INTERNAL MEDICINE

## 2024-08-15 PROCEDURE — 81001 URINALYSIS AUTO W/SCOPE: CPT | Performed by: PHYSICIAN ASSISTANT

## 2024-08-15 PROCEDURE — 80053 COMPREHEN METABOLIC PANEL: CPT | Performed by: PHYSICIAN ASSISTANT

## 2024-08-15 PROCEDURE — 25000003 PHARM REV CODE 250: Performed by: EMERGENCY MEDICINE

## 2024-08-15 PROCEDURE — 96374 THER/PROPH/DIAG INJ IV PUSH: CPT | Mod: 59

## 2024-08-15 PROCEDURE — 83935 ASSAY OF URINE OSMOLALITY: CPT | Performed by: PHYSICIAN ASSISTANT

## 2024-08-15 PROCEDURE — 85025 COMPLETE CBC W/AUTO DIFF WBC: CPT | Performed by: PHYSICIAN ASSISTANT

## 2024-08-15 PROCEDURE — 85730 THROMBOPLASTIN TIME PARTIAL: CPT | Performed by: PHYSICIAN ASSISTANT

## 2024-08-15 PROCEDURE — 84300 ASSAY OF URINE SODIUM: CPT | Performed by: PHYSICIAN ASSISTANT

## 2024-08-15 PROCEDURE — 84156 ASSAY OF PROTEIN URINE: CPT | Performed by: PHYSICIAN ASSISTANT

## 2024-08-15 PROCEDURE — 80197 ASSAY OF TACROLIMUS: CPT | Performed by: PHYSICIAN ASSISTANT

## 2024-08-15 PROCEDURE — 83930 ASSAY OF BLOOD OSMOLALITY: CPT | Performed by: PHYSICIAN ASSISTANT

## 2024-08-15 RX ORDER — HYDROMORPHONE HYDROCHLORIDE 1 MG/ML
0.2 INJECTION, SOLUTION INTRAMUSCULAR; INTRAVENOUS; SUBCUTANEOUS
Status: COMPLETED | OUTPATIENT
Start: 2024-08-15 | End: 2024-08-15

## 2024-08-15 RX ORDER — ACETAMINOPHEN 500 MG
1000 TABLET ORAL
Status: DISCONTINUED | OUTPATIENT
Start: 2024-08-15 | End: 2024-08-15

## 2024-08-15 RX ORDER — TACROLIMUS 0.5 MG/1
0.5 CAPSULE ORAL 2 TIMES DAILY
Status: DISCONTINUED | OUTPATIENT
Start: 2024-08-16 | End: 2024-08-19 | Stop reason: HOSPADM

## 2024-08-15 RX ORDER — SODIUM CHLORIDE 0.9 % (FLUSH) 0.9 %
10 SYRINGE (ML) INJECTION EVERY 12 HOURS PRN
Status: DISCONTINUED | OUTPATIENT
Start: 2024-08-15 | End: 2024-08-19 | Stop reason: HOSPADM

## 2024-08-15 RX ORDER — PANTOPRAZOLE SODIUM 40 MG/1
40 TABLET, DELAYED RELEASE ORAL DAILY
Status: DISCONTINUED | OUTPATIENT
Start: 2024-08-16 | End: 2024-08-19 | Stop reason: HOSPADM

## 2024-08-15 RX ORDER — GLUCAGON 1 MG
1 KIT INJECTION
Status: DISCONTINUED | OUTPATIENT
Start: 2024-08-15 | End: 2024-08-19 | Stop reason: HOSPADM

## 2024-08-15 RX ORDER — IBUPROFEN 200 MG
16 TABLET ORAL
Status: DISCONTINUED | OUTPATIENT
Start: 2024-08-15 | End: 2024-08-19 | Stop reason: HOSPADM

## 2024-08-15 RX ORDER — SODIUM CHLORIDE 9 MG/ML
1000 INJECTION, SOLUTION INTRAVENOUS
Status: COMPLETED | OUTPATIENT
Start: 2024-08-15 | End: 2024-08-15

## 2024-08-15 RX ORDER — IBUPROFEN 200 MG
24 TABLET ORAL
Status: DISCONTINUED | OUTPATIENT
Start: 2024-08-15 | End: 2024-08-19 | Stop reason: HOSPADM

## 2024-08-15 RX ORDER — LEVOTHYROXINE SODIUM 75 UG/1
75 TABLET ORAL DAILY
Status: DISCONTINUED | OUTPATIENT
Start: 2024-08-16 | End: 2024-08-19 | Stop reason: HOSPADM

## 2024-08-15 RX ORDER — TALC
6 POWDER (GRAM) TOPICAL NIGHTLY PRN
Status: DISCONTINUED | OUTPATIENT
Start: 2024-08-15 | End: 2024-08-19 | Stop reason: HOSPADM

## 2024-08-15 RX ORDER — SODIUM CHLORIDE 0.9 % (FLUSH) 0.9 %
10 SYRINGE (ML) INJECTION
Status: DISCONTINUED | OUTPATIENT
Start: 2024-08-15 | End: 2024-08-19 | Stop reason: HOSPADM

## 2024-08-15 RX ORDER — LACTULOSE 10 G/15ML
10 SOLUTION ORAL 3 TIMES DAILY
Status: DISCONTINUED | OUTPATIENT
Start: 2024-08-16 | End: 2024-08-16

## 2024-08-15 RX ORDER — ONDANSETRON HYDROCHLORIDE 2 MG/ML
4 INJECTION, SOLUTION INTRAVENOUS
Status: COMPLETED | OUTPATIENT
Start: 2024-08-15 | End: 2024-08-15

## 2024-08-15 RX ORDER — NALOXONE HCL 0.4 MG/ML
0.02 VIAL (ML) INJECTION
Status: DISCONTINUED | OUTPATIENT
Start: 2024-08-15 | End: 2024-08-19 | Stop reason: HOSPADM

## 2024-08-15 RX ADMIN — SODIUM CHLORIDE 1000 ML: 9 INJECTION, SOLUTION INTRAVENOUS at 05:08

## 2024-08-15 RX ADMIN — HYDROMORPHONE HYDROCHLORIDE 0.2 MG: 1 INJECTION, SOLUTION INTRAMUSCULAR; INTRAVENOUS; SUBCUTANEOUS at 05:08

## 2024-08-15 RX ADMIN — ONDANSETRON 4 MG: 2 INJECTION INTRAMUSCULAR; INTRAVENOUS at 05:08

## 2024-08-15 NOTE — ED PROVIDER NOTES
Encounter Date: 8/15/2024       History     Chief Complaint   Patient presents with    Abnormal labs     Na 126 with last set of labs. Liver transplant pt. Confusion. Jaundice.      53 yo F w/ h/o von Gierke dz s/p liver transplant w/ known cirrhosis and ascites and chronic hyponatremia.     Patient had some AMS and abdominal pain/distension. She was recently discharged after in inpatient stay for symptomatic hyponatremia.  Her diuretics were discontinued as an outpatient due to combined hepatic and renal dysfunction.  She reports compliance with her tacrolimus.  She does report some confusion but feels like she was not confused right now.  She reports significant headache that has been ongoing since even before her last discharge on 07/31/2024.  She denies a history of paracentesis she reports there was an attempted paracentesis at [facility] during her most recent stay but the interventional radiologist was unable to find sufficient fluid.  She denies fever chills.  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    Arnold-Chiari malformation     Bacteremia 12/22/2023    Depression     Esophageal stricture     Essential tremor     Hypertension     Left bundle branch block     Liver fibrosis, transplanted liver 10/02/2018    Suggested on fibroscan 10/2/18    Migraine without aura     MVP (mitral valve prolapse)     Non-rheumatic mitral regurgitation 10/01/2018    Non-rheumatic tricuspid valve insufficiency 10/01/2018    Osteoporosis      Perforated abdominal viscus 09/04/2020    Recurrent urinary tract infection     Seizures     Shingles 2007    SIADH (syndrome of inappropriate ADH production)     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    Tricuspid valve prolapse     Urolithiasis     Von Gierke disease     s/p liver transplant     Past Surgical History:   Procedure Laterality Date    APPENDECTOMY  6/22/2007    APPLICATION OF WOUND VACUUM-ASSISTED CLOSURE DEVICE N/A 9/18/2020    Procedure: APPLICATION, WOUND VAC;  Surgeon: Zain Decker MD;  Location: [facility] OR [address] [place] [address];  Service: General;  Laterality: N/A;    COLONOSCOPY  5/13/2008    internal hemorrhoids    CRANIOTOMY      ESOPHAGOGASTRODUODENOSCOPY N/A 9/3/2020    Procedure: EGD (ESOPHAGOGASTRODUODENOSCOPY);  Surgeon: Tyrel Vergara MD;  Location: [facility];  Service: Endoscopy;  Laterality: N/A;    ESOPHAGOGASTRODUODENOSCOPY N/A 12/22/2023    Procedure: EGD (ESOPHAGOGASTRODUODENOSCOPY);  Surgeon: Jhony James MD;  Location: [facility] ([address] [place] [address]);  Service: Endoscopy;  Laterality: N/A;    EXPLORATORY LAPAROTOMY  2020    due to perforated stomach    LAMINECTOMY  3/2001    LIVER TRANSPLANT  9/23/2002    OSSICULAR RECONSTRUCTION  10/4/1995    RIGHT REPLACEMENT PROSTHESIS for cholesteatoma    THYMECTOMY  5/2/2007    TONSILLECTOMY, ADENOIDECTOMY  1/21/2004    TOTAL ABDOMINAL HYSTERECTOMY  3/31/1994     Family History   Problem Relation Name Age of Onset    Stroke Mother      Heart disease Mother      No Known Problems Father       Social History     Tobacco Use    Smoking status: Never    Smokeless tobacco: Never   Substance Use Topics    Alcohol use: No    Drug use: No     Review of Systems  All other systems reviewed and negative except as noted in HPI    Physical Exam     Initial Vitals [08/15/24 1350]   BP Pulse Resp Temp SpO2   (!) 154/66 72 20 99.1 °F (37.3 °C) 100 %      MAP       --         Physical Exam    Constitutional: She appears well-developed and well-nourished. She is not  diaphoretic. No distress.   HENT:   Head: Normocephalic and atraumatic.   Eyes: Conjunctivae and EOM are normal. Pupils are equal, round, and reactive to light.   Neck: Neck supple.   Normal range of motion.  Cardiovascular:  Normal rate, regular rhythm, normal heart sounds and intact distal pulses.           Pulmonary/Chest: Breath sounds normal. No respiratory distress.   Abdominal: Abdomen is soft. She exhibits distension. She exhibits no mass. There is no abdominal tenderness. There is no rebound and no guarding.   Musculoskeletal:         General: No tenderness or edema. Normal range of motion.      Cervical back: Normal range of motion and neck supple.     Neurological: She is alert and oriented to person, place, and time. No cranial nerve deficit or sensory deficit. GCS score is 15. GCS eye subscore is 4. GCS verbal subscore is 5. GCS motor subscore is 6.   Skin: Skin is warm and dry. Capillary refill takes less than 2 seconds.         ED Course   Procedures  Labs Reviewed   CBC W/ AUTO DIFFERENTIAL - Abnormal       Result Value    WBC 3.49 (*)     RBC 2.75 (*)     Hemoglobin 8.2 (*)     Hematocrit 23.6 (*)     MCV 86      MCH 29.8      MCHC 34.7      RDW 15.5 (*)     Platelets 54 (*)     MPV 13.4 (*)     Immature Granulocytes 0.3      Gran # (ANC) 2.3      Immature Grans (Abs) 0.01      Lymph # 0.6 (*)     Mono # 0.4      Eos # 0.1      Baso # 0.02      nRBC 0      Gran % 66.5      Lymph % 17.5 (*)     Mono % 11.7      Eosinophil % 3.4      Basophil % 0.6      Differential Method Automated     COMPREHENSIVE METABOLIC PANEL - Abnormal    Sodium 126 (*)     Potassium 4.9      Chloride 99      CO2 18 (*)     Glucose 82      BUN 38 (*)     Creatinine 2.2 (*)     Calcium 8.8      Total Protein 5.8 (*)     Albumin 2.5 (*)     Total Bilirubin 3.9 (*)     Alkaline Phosphatase 390 (*)     AST 61 (*)     ALT 34      eGFR 25.7 (*)     Anion Gap 9     APTT - Abnormal    aPTT 53.2 (*)    PROTIME-INR - Abnormal     Prothrombin Time 20.4 (*)     INR 1.9 (*)    URINALYSIS, REFLEX TO URINE CULTURE - Abnormal    Specimen UA Urine, Clean Catch      Color, UA Yellow      Appearance, UA Clear      pH, UA 7.0      Specific Gravity, UA 1.010      Protein, UA Negative      Glucose, UA Negative      Ketones, UA Negative      Bilirubin (UA) Negative      Occult Blood UA Negative      Nitrite, UA Negative      Leukocytes, UA Trace (*)     Narrative:     Specimen Source->Urine   AMMONIA - Abnormal    Ammonia 59 (*)    BILIRUBIN, DIRECT - Abnormal    Bilirubin, Direct 2.8 (*)    IRON AND TIBC - Abnormal    Iron 133      Transferrin 162 (*)     TIBC 240 (*)     Saturated Iron 55 (*)    SODIUM, URINE, RANDOM - Abnormal    Sodium, Urine 12 (*)     Narrative:     Add-on UNAR, UPRCR, and UROSM to [id number] per Carlos Solomon DO   on  08/15/2024  21:54     Specimen Source->Urine   BASIC METABOLIC PANEL - Abnormal    Sodium 126 (*)     Potassium 4.9      Chloride 102      CO2 16 (*)     Glucose 98      BUN 37 (*)     Creatinine 2.3 (*)     Calcium 8.7      Anion Gap 8      eGFR 24.3 (*)    COMPREHENSIVE METABOLIC PANEL - Abnormal    Sodium 130 (*)     Potassium 4.3      Chloride 104      CO2 18 (*)     Glucose 101      BUN 37 (*)     Creatinine 2.4 (*)     Calcium 8.6 (*)     Total Protein 5.3 (*)     Albumin 2.3 (*)     Total Bilirubin 3.5 (*)     Alkaline Phosphatase 359 (*)     AST 53 (*)     ALT 33      eGFR 23.1 (*)     Anion Gap 8     CBC W/ AUTO DIFFERENTIAL - Abnormal    WBC 2.68 (*)     RBC 2.54 (*)     Hemoglobin 7.4 (*)     Hematocrit 22.4 (*)     MCV 88      MCH 29.1      MCHC 33.0      RDW 15.5 (*)     Platelets 50 (*)     MPV 11.9      Immature Granulocytes 0.4      Gran # (ANC) 1.7 (*)     Immature Grans (Abs) 0.01      Lymph # 0.5 (*)     Mono # 0.4      Eos # 0.1      Baso # 0.02      nRBC 0      Gran % 63.1      Lymph % 17.5 (*)     Mono % 15.7 (*)     Eosinophil % 2.6      Basophil % 0.7      Differential Method Automated      APTT - Abnormal    aPTT 32.8 (*)    PROTIME-INR - Abnormal    Prothrombin Time 14.2 (*)     INR 1.3 (*)    BASIC METABOLIC PANEL - Abnormal    Sodium 130 (*)     Potassium 4.3      Chloride 104      CO2 18 (*)     Glucose 101      BUN 37 (*)     Creatinine 2.4 (*)     Calcium 8.6 (*)     Anion Gap 8      eGFR 23.1 (*)    SODIUM, URINE, RANDOM - Abnormal    Sodium, Urine 12 (*)     Narrative:     Specimen Source->Urine   CHLORIDE, URINE, RANDOM - Abnormal    Chloride, Urine 20 (*)     Narrative:     Specimen Source->Urine   CULTURE, BLOOD    Blood Culture, Routine No Growth to date     CULTURE, BLOOD    Blood Culture, Routine No Growth to date     CULTURE, AEROBIC  (SPECIFY SOURCE)   CULTURE, ANAEROBIC   GRAM STAIN   HIV 1 / 2 ANTIBODY    HIV 1/2 Ag/Ab Non-reactive      Narrative:     Release to patient->Immediate   TACROLIMUS LEVEL   URINALYSIS MICROSCOPIC    RBC, UA 1      WBC, UA 5      Bacteria Rare      Squam Epithel, UA 2      Microscopic Comment SEE COMMENT      Narrative:     Specimen Source->Urine   TACROLIMUS LEVEL    Tacrolimus Lvl 6.0      Narrative:     Add-on OSMO to [id number] per Carlos Solomon DO on  08/15/2024    22:03     ADD ON TACROLIMUS LEVEL [id number] PER SUSANNA RENE MD 21:09    08/15/2024    CREATININE, URINE, RANDOM   OSMOLALITY, SERUM   OSMOLALITY, URINE RANDOM   PROTEIN / CREATININE RATIO, URINE   SODIUM, URINE, RANDOM   FERRITIN    Ferritin 228     OSMOLALITY, URINE RANDOM    Osmolality, Urine 248      Narrative:     Add-on UNAR, UPRCR, and UROSM to [id number] per Carlos Solomon DO   on  08/15/2024  21:54     Specimen Source->Urine   PROTEIN / CREATININE RATIO, URINE    Protein, Urine Random <7      Creatinine, Urine 55.0      Prot/Creat Ratio, Urine Unable to calculate      Narrative:     Add-on UNAR, UPRCR, and UROSM to [id number] per Carlos Solomon DO   on  08/15/2024  21:54     Specimen Source->Urine   OSMOLALITY, SERUM    Osmolality 282      Narrative:     Add-on  OSMO to [id number] per Carlos Solomon DO on  08/15/2024    22:03     ADD ON TACROLIMUS LEVEL [id number] PER SESSIONS, SUSANNA LOMAX MD 21:09    08/15/2024    MAGNESIUM    Magnesium 2.2     PHOSPHORUS    Phosphorus 3.4     OSMOLALITY, URINE RANDOM    Osmolality, Urine 251      Narrative:     Specimen Source->Urine   ALBUMIN, PERITONEAL, PLEURAL FLUID OR KERRY DRAINAGE, IN-HOUSE   PROTEIN, PERITONEAL, PLEURAL FLUID OR KERRY DRAINAGE, IN-HOUSE   LDH, PERITONEAL, PLEURAL FLUID OR KERRY DRAINAGE, IN-HOUSE   WBC & DIFF, BODY FLUID   OSMOLALITY, SERUM   BASIC METABOLIC PANEL   POCT GLUCOSE    POCT Glucose 95          ECG Results              EKG 12-lead (Final result)        Collection Time Result Time QRS Duration OHS QTC Calculation    08/15/24 19:41:03 08/16/24 12:01:33 156 492                     Final result by Interface, Lab In [facility] (08/16/24 12:01:36)                   Narrative:    Test Reason : R41.82,    Vent. Rate : 072 BPM     Atrial Rate : 072 BPM     P-R Int : 142 ms          QRS Dur : 156 ms      QT Int : 450 ms       P-R-T Axes : 072 014 136 degrees     QTc Int : 492 ms    Normal sinus rhythm  Left bundle branch block  Abnormal ECG  When compared with ECG of 27-JUL-2024 21:47,  No significant change was found  Confirmed by John PORTILLO MD ([id number]) on 8/16/2024 12:01:25 PM    Referred By: [email or web address]   SELF           Confirmed By:John PORTILLO MD                                  Imaging Results              IR Paracentesis with Imaging (Preliminary result)  Result time 08/16/24 16:21:28      Preliminary result by Diana Hewitt, PAJACQUI (08/16/24 16:21:28)                   Impression:      Ultrasound-guided paracentesis with drainage of 1100 mL of serous fluid.    _______________________________________________________________    Electronically signed by resident: Diana Hewitt  Date:    08/16/2024  Time:    16:20                 Narrative:    EXAMINATION:  Ultrasound-guided paracentesis    Procedural Personnel    Attending  physician(s): Dana Shen MD    Fellow physician(s): None    Resident physician(s): None    Advanced practice provider(s): Diana Hewitt PA-C    Pre-procedure diagnosis: Ascites    Post-procedure diagnosis: Same    Complications: No immediate complications.    CLINICAL HISTORY:  Recurrent Ascites    TECHNIQUE:  - Ultrasound-guided paracentesis    FINDINGS:  Pre-procedure    Consent: Informed consent for the procedure was obtained and time-out was performed prior to the procedure.    Preparation: The site was prepared and draped using maximal sterile barrier technique including cutaneous antisepsis.    Anesthesia/sedation    Level of anesthesia/sedation: No sedation    Anesthesia/sedation administered by: Not applicable    Total intra-service sedation time (minutes): 0    Limited abdominal ultrasound    Limited abdominal ultrasound was performed.    Small ascites. A safe window for paracentesis was identified.    Paracentesis    Local anesthesia was administered. The peritoneal cavity was accessed on the right lower quadrant and fluid return confirmed position. Ascites was drained.    Paracentesis access technique: Real-time ultrasound guidance    Catheter placed: 5F One step    Closure    The catheter was removed. A sterile bandage was applied.    Post-drainage ultrasound: Not performed    Additional Details    Additional description of procedure: None    Equipment details: None    Specimens removed: Abdominal fluid    Estimated blood loss (mL): Less than 10    Standardized report: [email or web address]    Attestation    Signer name: Dana Shen MD    I attest that I reviewed the stored images and agree with the report as written.                                       CT Head Without Contrast (Final result)  Result time 08/16/24 01:19:46      Final result by Victor Hugo Ontiveros DO (08/16/24 01:19:46)                   Impression:      No acute intracranial abnormality.    Chronic microvascular ischemic changes  and stable chronic parenchymal calcifications.      Electronically signed by: Victor Hugo Ontiveros  Date:    08/16/2024  Time:    01:19               Narrative:    EXAMINATION:  CT HEAD WITHOUT CONTRAST    CLINICAL HISTORY:  Headache, sudden, severe;Memory loss;Mental status change, unknown cause;    TECHNIQUE:  Low dose axial CT images obtained throughout the head without intravenous contrast. Sagittal and coronal reconstructions were performed.    COMPARISON:  CT head from 01/08/2024    FINDINGS:  Ventricles and sulci are normal in size for age without evidence of hydrocephalus. No extra-axial blood or fluid collections.  There are chronic microvascular ischemic changes, stable.  There are continued scattered prominent foci of calcification, particularly within the bilateral frontal lobes and basal ganglia, stable.  No parenchymal mass, hemorrhage, edema or major vascular distribution infarct.    No calvarial fracture.  There are postop changes of remote suboccipital craniotomy.  The scalp is unremarkable.  Bilateral paranasal sinuses and mastoid air cells are clear.                                       US Doppler Liver Transplant Post (xpd) (Final result)  Result time 08/16/24 03:18:08      Final result by Clark Morales MD (08/16/24 03:18:08)                   Impression:      Patent hepatic vasculature.    Mildly elevated intrahepatic arterial resistive indices, noting improvement from 06/17/2024, which can be seen in transplant rejection or chronic liver disease.    Unchanged heterogeneous hepatic echotexture, which can be seen in chronic liver disease.    Electronically signed by resident: Brayan Wang  Date:    08/16/2024  Time:    02:56    Electronically signed by: Clark Morales MD  Date:    08/16/2024  Time:    03:18               Narrative:    EXAMINATION:  US DOPPLER LIVER TRANSPLANT POST (XPD)    CLINICAL HISTORY:  cirrhosis, hyponat;    TECHNIQUE:  Limited abdominal ultrasound of the transplant  liver with Doppler evaluation.  Color and spectral Doppler were performed.    COMPARISON:  Liver Doppler ultrasound 06/17/2024.    FINDINGS:  Exam quality is limited by patient movement and cooperation with the study per sonographer notes.    Patient is status post orthotopic liver transplant 09/23/2002. The liver demonstrates heterogeneous echotexture, similar.  No focal hepatic lesions are seen.  No fluid collections.    The common duct is not dilated, measuring 3 mm.  No dilated intrahepatic radicles are seen.    Color flow and spectral waveform analysis was performed.  The main portal vein, right portal vein, left portal vein, middle hepatic vein, right hepatic vein, left hepatic vein, and IVC are patent with proper directional flow.    Anastomosis site of the main hepatic artery demonstrates a peak systolic velocity 115 cm/sec.    Main hepatic artery demonstrates resistive index 0.78 with normal waveform.    Left hepatic artery shows resistive index 0.74 with normal waveform.    Anterior branch of the right hepatic artery demonstrates resistive index 0.76 with normal waveform.    Posterior branch of the right hepatic artery demonstrates resistive index 0.74 with normal waveform.                                       US Retroperitoneal Complete (Final result)  Result time 08/15/24 23:45:58      Final result by Clark Morales MD (08/15/24 23:45:58)                   Impression:      No hydronephrosis.    Additional findings discussed in the body of the report.      Electronically signed by: Clark Morales MD  Date:    08/15/2024  Time:    23:45               Narrative:    EXAMINATION:  US RETROPERITONEAL COMPLETE    CLINICAL HISTORY:  JACQUE, r/o obstruction & hydronephrosis;    TECHNIQUE:  Ultrasound of the kidneys and urinary bladder was performed including color flow and Doppler evaluation of the kidneys.    COMPARISON:  CT, 12/21/2023.    FINDINGS:  Right kidney: The right kidney measures 11.3 cm. No  cortical thinning. No loss of corticomedullary distinction. Resistive index measures 0.76, mildly elevated.  No mass. Suspect punctate nonobstructing stone in the upper pole.  No hydronephrosis.    Left kidney: The left kidney measures 1.2 cm. No cortical thinning. No loss of corticomedullary distinction. Resistive index measures 0.74, mildly elevated.  No mass. Suspect 3 punctate nonobstructing stones.  No hydronephrosis.    The bladder is partially distended at the time of scanning and has an unremarkable appearance.  Trace fluid adjacent to the right kidney and upper abdomen.                                       Medications   sodium chloride 0.9% flush 10 mL (has no administration in time range)   melatonin tablet 6 mg (has no administration in time range)   sodium chloride 0.9% flush 10 mL (has no administration in time range)   naloxone 0.4 mg/mL injection 0.02 mg (has no administration in time range)   glucose chewable tablet 16 g (has no administration in time range)   glucose chewable tablet 24 g (has no administration in time range)   glucagon (human recombinant) injection 1 mg (has no administration in time range)   dextrose 10% bolus 125 mL 125 mL (has no administration in time range)   dextrose 10% bolus 250 mL 250 mL (has no administration in time range)   levothyroxine tablet 75 mcg (75 mcg Oral Given 8/16/24 0814)   tacrolimus capsule 0.5 mg (0.5 mg Oral Drug Level Due 8/17/24 0730)   cefTRIAXone (ROCEPHIN) 2 g in D5W 100 mL IVPB (MB+) (0 g Intravenous Stopped 8/16/24 0245)   pantoprazole EC tablet 40 mg (40 mg Oral Given 8/16/24 0814)   sodium chloride 0.9% flush 10 mL (10 mLs Intravenous Not Given 8/16/24 1200)     And   sodium chloride 0.9% flush 10 mL (has no administration in time range)   rifAXIMin tablet 550 mg (550 mg Oral Given 8/16/24 0814)   lactulose 20 gram/30 mL solution Soln 30 g (30 g Oral Given 8/16/24 1503)   albumin human 25% bottle 60 g (0 g Intravenous Stopped 8/16/24 0612)    0.9%  NaCl infusion ( Intravenous New Bag 8/16/24 1502)   0.9%  NaCl infusion (0 mLs Intravenous Stopped 8/15/24 2141)   ondansetron injection 4 mg (4 mg Intravenous Given 8/15/24 1735)   HYDROmorphone injection 0.2 mg (0.2 mg Intravenous Given 8/15/24 1745)     Medical Decision Making  Concern for recurrence of symptomatic hyponatremia.  Of note, patient did report some abdominal distention to hepatology on the phone.  However, her abdomen is soft here and there is no tympany.  The largest pocket of ascites fluid I can find was in her right lower quadrant was about 2.88 x 1 cm.  Given that the patient had failed attempted paracentesis and she was no fever, leukocytosis, abdominal pain at this time, I will not perform a diagnostic paracentesis but she may require 1 by IR after she receives a significant volume of IV fluids for her symptomatic hyponatremia.  Headache relief provided.  Antiemetic provided.    Amount and/or Complexity of Data Reviewed  External Data Reviewed: labs, radiology, ECG and notes.     Details: History of liver transplant with recent admission for symptomatic anemia.  Failed paracentesis due to lack of ascites fluid at that time.  Patient improved with improvement in her hyponatremia.  Diuretics discontinued due to hepatic and renal dysfunction.  Previously on furosemide and spironolactone  Labs: ordered. Decision-making details documented in ED Course.  Radiology: ordered and independent interpretation performed. Decision-making details documented in ED Course.  ECG/medicine tests: independent interpretation performed. Decision-making details documented in ED Course.    Risk  OTC drugs.  Prescription drug management.  Decision regarding hospitalization.               ED Course as of 08/16/24 1626   u Aug 15, 2024   1727 Will plan for admission for symptomatic hyponatremia and inability to tolerate diuretic therapy as an outpatient [DS]   1734 Patient was refusing acetaminophen for  headache and requesting Dilaudid [DS]   2038 Pancytopenia without significant change from previous [DS]   2038 CBC auto differential(!) [DS]      ED Course User Index  [DS] Sessions, John LOMAX MD                             Clinical Impression:  Final diagnoses:  [R41.82] Altered mental status  [N17.9, N18.9] Acute renal failure superimposed on chronic kidney disease, unspecified acute renal failure type, unspecified CKD stage  [E87.1] Hyponatremia (Primary)          ED Disposition Condition    Admit                 Geoffrey, John LOMAX MD  08/15/24 2004       John Hale MD  08/16/24 1837

## 2024-08-15 NOTE — ED TRIAGE NOTES
Patient arrived via personal transport for the chief complaint of abdomen labs and confusion. The patient has a sodium of 126 per MD. Patient a liver transplant patient in 2002. Patient is disoriented to situation.

## 2024-08-15 NOTE — ED NOTES
Second nurse unable to establish IV accesses for blood drawl, another nurse at bedside for third attempt

## 2024-08-15 NOTE — TELEPHONE ENCOUNTER
Returned call patient's  states he is going to take her to ER.  She had AMS and swollen abdomen.  I advised he take her here.    ----- Message from Ness Irving sent at 8/15/2024 10:22 AM CDT -----  Regarding: Consult/Advisory  Contact: Kody ernandez     Consult/Advisory     Name Of Caller:Kody         Contact Preference:[phone]     Nature of call:Patients  is calling to speak to Maude about bringing patient to ER. Patient is having issues and can't make lab appt.  states that stomach is really swollen and in a lot of pain. Requesting a call back

## 2024-08-15 NOTE — FIRST PROVIDER EVALUATION
Emergency Department TeleTriage Encounter Note      CHIEF COMPLAINT    Chief Complaint   Patient presents with    Abnormal labs     Na 126 with last set of labs. Liver transplant pt. Confusion. Jaundice.        VITAL SIGNS   Initial Vitals [08/15/24 1350]   BP Pulse Resp Temp SpO2   (!) 154/66 72 20 99.1 °F (37.3 °C) 100 %      MAP       --            ALLERGIES    Review of patient's allergies indicates:   Allergen Reactions    Codeine Itching     Other reaction(s): Itching    Lipitor [atorvastatin] Other (See Comments)     Other reaction(s): Muscle pain  Muscle cranmps    Morphine Itching     Other reaction(s): nausea and vomiting     Zoloft [sertraline] Other (See Comments)     Tremors/muscle spasms       PROVIDER TRIAGE NOTE  This is a teletriage evaluation of a 54 y.o. female presenting to the ED complaining of hyponatremia. Patient woke up this morning with confusion and incontinence.     Patient is alert. She is sitting in the chair in no distress.    Initial orders will be placed and care will be transferred to an alternate provider when patient is roomed for a full evaluation. Any additional orders and the final disposition will be determined by that provider.         ORDERS  Labs Reviewed   HIV 1 / 2 ANTIBODY   CBC W/ AUTO DIFFERENTIAL   COMPREHENSIVE METABOLIC PANEL   APTT   PROTIME-INR   URINALYSIS, REFLEX TO URINE CULTURE   AMMONIA       ED Orders (720h ago, onward)      Start Ordered     Status Ordering Provider    08/15/24 1552 08/15/24 1551  APTT  STAT         Ordered MACO OZUNA    08/15/24 1552 08/15/24 1551  Protime-INR  STAT         Ordered SULMAMACO    08/15/24 1552 08/15/24 1551  Urinalysis, Reflex to Urine Culture Urine, Clean Catch  STAT         Ordered SULMAMACO G.    08/15/24 1552 08/15/24 1551  Ammonia  Once         Ordered SULMA, MACO G.    08/15/24 1551 08/15/24 1551  CBC auto differential  STAT         Ordered SULMAPUNEETY G.    08/15/24 1551 08/15/24 1551  Comprehensive  metabolic panel  STAT         Ordered MACO OZUNA.    08/15/24 1551 08/15/24 1551  Insert Saline lock IV  Once         Ordered MACO OZUNA.    08/15/24 1551 08/15/24 1551  EKG 12-lead  Once         Ordered MACO OZUNA.    08/15/24 1353 08/15/24 1352  HIV 1/2 Ag/Ab (4th Gen)  STAT         Acknowledged MADAN LANDAVERDE              Virtual Visit Note: The provider triage portion of this emergency department evaluation and documentation was performed via [employer], a HIPAA-compliant telemedicine application, in concert with a tele-presenter in the room. A face to face patient evaluation with one of my colleagues will occur once the patient is placed in an emergency department room.      DISCLAIMER: This note was prepared with [employer] voice recognition transcription software. Garbled syntax, mangled pronouns, and other bizarre constructions may be attributed to that software system.

## 2024-08-16 ENCOUNTER — PATIENT MESSAGE (OUTPATIENT)
Dept: TRANSPLANT | Facility: CLINIC | Age: 54
End: 2024-08-16
Payer: MEDICARE

## 2024-08-16 LAB
ALBUMIN FLD-MCNC: 0.4 G/DL
ALBUMIN SERPL BCP-MCNC: 2.3 G/DL (ref 3.5–5.2)
ALP SERPL-CCNC: 359 U/L (ref 55–135)
ALT SERPL W/O P-5'-P-CCNC: 33 U/L (ref 10–44)
AMMONIA PLAS-SCNC: 59 UMOL/L (ref 10–50)
ANION GAP SERPL CALC-SCNC: 8 MMOL/L (ref 8–16)
APPEARANCE FLD: NORMAL
APTT PPP: 32.8 SEC (ref 21–32)
AST SERPL-CCNC: 53 U/L (ref 10–40)
BASOPHILS # BLD AUTO: 0.02 K/UL (ref 0–0.2)
BASOPHILS NFR BLD: 0.7 % (ref 0–1.9)
BILIRUB DIRECT SERPL-MCNC: 2.8 MG/DL (ref 0.1–0.3)
BILIRUB SERPL-MCNC: 3.5 MG/DL (ref 0.1–1)
BODY FLD TYPE: NORMAL
BODY FLUID SOURCE, LDH: NORMAL
BUN SERPL-MCNC: 37 MG/DL (ref 6–20)
CALCIUM SERPL-MCNC: 8.6 MG/DL (ref 8.7–10.5)
CALCIUM SERPL-MCNC: 8.6 MG/DL (ref 8.7–10.5)
CALCIUM SERPL-MCNC: 8.7 MG/DL (ref 8.7–10.5)
CHLORIDE SERPL-SCNC: 102 MMOL/L (ref 95–110)
CHLORIDE SERPL-SCNC: 104 MMOL/L (ref 95–110)
CHLORIDE SERPL-SCNC: 104 MMOL/L (ref 95–110)
CHLORIDE UR-SCNC: 20 MMOL/L (ref 25–200)
CO2 SERPL-SCNC: 16 MMOL/L (ref 23–29)
CO2 SERPL-SCNC: 18 MMOL/L (ref 23–29)
CO2 SERPL-SCNC: 18 MMOL/L (ref 23–29)
COLOR FLD: YELLOW
CREAT SERPL-MCNC: 2.3 MG/DL (ref 0.5–1.4)
CREAT SERPL-MCNC: 2.4 MG/DL (ref 0.5–1.4)
CREAT SERPL-MCNC: 2.4 MG/DL (ref 0.5–1.4)
DIFFERENTIAL METHOD BLD: ABNORMAL
EOSINOPHIL # BLD AUTO: 0.1 K/UL (ref 0–0.5)
EOSINOPHIL NFR BLD: 2.6 % (ref 0–8)
EOSINOPHIL NFR FLD MANUAL: 1 %
ERYTHROCYTE [DISTWIDTH] IN BLOOD BY AUTOMATED COUNT: 15.5 % (ref 11.5–14.5)
EST. GFR  (NO RACE VARIABLE): 23.1 ML/MIN/1.73 M^2
EST. GFR  (NO RACE VARIABLE): 23.1 ML/MIN/1.73 M^2
EST. GFR  (NO RACE VARIABLE): 24.3 ML/MIN/1.73 M^2
FERRITIN SERPL-MCNC: 228 NG/ML (ref 20–300)
GLUCOSE SERPL-MCNC: 101 MG/DL (ref 70–110)
GLUCOSE SERPL-MCNC: 101 MG/DL (ref 70–110)
GLUCOSE SERPL-MCNC: 98 MG/DL (ref 70–110)
GRAM STN SPEC: NORMAL
GRAM STN SPEC: NORMAL
HCT VFR BLD AUTO: 22.4 % (ref 37–48.5)
HGB BLD-MCNC: 7.4 G/DL (ref 12–16)
IMM GRANULOCYTES # BLD AUTO: 0.01 K/UL (ref 0–0.04)
IMM GRANULOCYTES NFR BLD AUTO: 0.4 % (ref 0–0.5)
INR PPP: 1.3 (ref 0.8–1.2)
IRON SERPL-MCNC: 133 UG/DL (ref 30–160)
LDH FLD L TO P-CCNC: 46 U/L
LYMPHOCYTES # BLD AUTO: 0.5 K/UL (ref 1–4.8)
LYMPHOCYTES NFR BLD: 17.5 % (ref 18–48)
LYMPHOCYTES NFR FLD MANUAL: 69 %
MAGNESIUM SERPL-MCNC: 2.2 MG/DL (ref 1.6–2.6)
MCH RBC QN AUTO: 29.1 PG (ref 27–31)
MCHC RBC AUTO-ENTMCNC: 33 G/DL (ref 32–36)
MCV RBC AUTO: 88 FL (ref 82–98)
MESOTHL CELL NFR FLD MANUAL: 5 %
MONOCYTES # BLD AUTO: 0.4 K/UL (ref 0.3–1)
MONOCYTES NFR BLD: 15.7 % (ref 4–15)
MONOS+MACROS NFR FLD MANUAL: 14 %
NEUTROPHILS # BLD AUTO: 1.7 K/UL (ref 1.8–7.7)
NEUTROPHILS NFR BLD: 63.1 % (ref 38–73)
NEUTROPHILS NFR FLD MANUAL: 11 %
NRBC BLD-RTO: 0 /100 WBC
OHS QRS DURATION: 156 MS
OHS QTC CALCULATION: 492 MS
OSMOLALITY UR: 251 MOSM/KG (ref 50–1200)
PHOSPHATE SERPL-MCNC: 3.4 MG/DL (ref 2.7–4.5)
PLATELET # BLD AUTO: 50 K/UL (ref 150–450)
PMV BLD AUTO: 11.9 FL (ref 9.2–12.9)
POCT GLUCOSE: 95 MG/DL (ref 70–110)
POTASSIUM SERPL-SCNC: 4.3 MMOL/L (ref 3.5–5.1)
POTASSIUM SERPL-SCNC: 4.3 MMOL/L (ref 3.5–5.1)
POTASSIUM SERPL-SCNC: 4.9 MMOL/L (ref 3.5–5.1)
PROT FLD-MCNC: <1 G/DL
PROT SERPL-MCNC: 5.3 G/DL (ref 6–8.4)
PROTHROMBIN TIME: 14.2 SEC (ref 9–12.5)
RBC # BLD AUTO: 2.54 M/UL (ref 4–5.4)
SATURATED IRON: 55 % (ref 20–50)
SODIUM SERPL-SCNC: 126 MMOL/L (ref 136–145)
SODIUM SERPL-SCNC: 130 MMOL/L (ref 136–145)
SODIUM SERPL-SCNC: 130 MMOL/L (ref 136–145)
SODIUM UR-SCNC: 12 MMOL/L (ref 20–250)
SPECIMEN SOURCE: NORMAL
SPECIMEN SOURCE: NORMAL
TACROLIMUS BLD-MCNC: 6 NG/ML (ref 5–15)
TOTAL IRON BINDING CAPACITY: 240 UG/DL (ref 250–450)
TRANSFERRIN SERPL-MCNC: 162 MG/DL (ref 200–375)
WBC # BLD AUTO: 2.68 K/UL (ref 3.9–12.7)
WBC # FLD: 71 /CU MM

## 2024-08-16 PROCEDURE — 84100 ASSAY OF PHOSPHORUS: CPT

## 2024-08-16 PROCEDURE — 25000003 PHARM REV CODE 250

## 2024-08-16 PROCEDURE — 83540 ASSAY OF IRON: CPT

## 2024-08-16 PROCEDURE — 83935 ASSAY OF URINE OSMOLALITY: CPT

## 2024-08-16 PROCEDURE — 36410 VNPNXR 3YR/> PHY/QHP DX/THER: CPT

## 2024-08-16 PROCEDURE — 85025 COMPLETE CBC W/AUTO DIFF WBC: CPT

## 2024-08-16 PROCEDURE — 87070 CULTURE OTHR SPECIMN AEROBIC: CPT

## 2024-08-16 PROCEDURE — 84157 ASSAY OF PROTEIN OTHER: CPT

## 2024-08-16 PROCEDURE — 63600175 PHARM REV CODE 636 W HCPCS

## 2024-08-16 PROCEDURE — 21400001 HC TELEMETRY ROOM

## 2024-08-16 PROCEDURE — A4216 STERILE WATER/SALINE, 10 ML: HCPCS

## 2024-08-16 PROCEDURE — 30233N1 TRANSFUSION OF NONAUTOLOGOUS RED BLOOD CELLS INTO PERIPHERAL VEIN, PERCUTANEOUS APPROACH: ICD-10-PCS | Performed by: INTERNAL MEDICINE

## 2024-08-16 PROCEDURE — 99222 1ST HOSP IP/OBS MODERATE 55: CPT | Mod: ,,, | Performed by: INTERNAL MEDICINE

## 2024-08-16 PROCEDURE — 96367 TX/PROPH/DG ADDL SEQ IV INF: CPT

## 2024-08-16 PROCEDURE — C1751 CATH, INF, PER/CENT/MIDLINE: HCPCS

## 2024-08-16 PROCEDURE — 99223 1ST HOSP IP/OBS HIGH 75: CPT | Mod: GC,,, | Performed by: INTERNAL MEDICINE

## 2024-08-16 PROCEDURE — 96365 THER/PROPH/DIAG IV INF INIT: CPT

## 2024-08-16 PROCEDURE — 82248 BILIRUBIN DIRECT: CPT

## 2024-08-16 PROCEDURE — 85610 PROTHROMBIN TIME: CPT

## 2024-08-16 PROCEDURE — 0W9G3ZZ DRAINAGE OF PERITONEAL CAVITY, PERCUTANEOUS APPROACH: ICD-10-PCS | Performed by: INTERNAL MEDICINE

## 2024-08-16 PROCEDURE — 87075 CULTR BACTERIA EXCEPT BLOOD: CPT

## 2024-08-16 PROCEDURE — 82140 ASSAY OF AMMONIA: CPT

## 2024-08-16 PROCEDURE — 84300 ASSAY OF URINE SODIUM: CPT

## 2024-08-16 PROCEDURE — 83735 ASSAY OF MAGNESIUM: CPT

## 2024-08-16 PROCEDURE — 87040 BLOOD CULTURE FOR BACTERIA: CPT

## 2024-08-16 PROCEDURE — 89051 BODY FLUID CELL COUNT: CPT

## 2024-08-16 PROCEDURE — 80048 BASIC METABOLIC PNL TOTAL CA: CPT | Mod: XB

## 2024-08-16 PROCEDURE — 87205 SMEAR GRAM STAIN: CPT

## 2024-08-16 PROCEDURE — 80053 COMPREHEN METABOLIC PANEL: CPT

## 2024-08-16 PROCEDURE — 82436 ASSAY OF URINE CHLORIDE: CPT

## 2024-08-16 PROCEDURE — 83615 LACTATE (LD) (LDH) ENZYME: CPT

## 2024-08-16 PROCEDURE — 85730 THROMBOPLASTIN TIME PARTIAL: CPT

## 2024-08-16 PROCEDURE — 82728 ASSAY OF FERRITIN: CPT

## 2024-08-16 PROCEDURE — 82042 OTHER SOURCE ALBUMIN QUAN EA: CPT

## 2024-08-16 PROCEDURE — P9047 ALBUMIN (HUMAN), 25%, 50ML: HCPCS | Mod: JZ,JG

## 2024-08-16 RX ORDER — SODIUM CHLORIDE 9 MG/ML
INJECTION, SOLUTION INTRAVENOUS CONTINUOUS
Status: ACTIVE | OUTPATIENT
Start: 2024-08-16 | End: 2024-08-17

## 2024-08-16 RX ORDER — ALBUMIN HUMAN 250 G/1000ML
60 SOLUTION INTRAVENOUS DAILY
Status: COMPLETED | OUTPATIENT
Start: 2024-08-16 | End: 2024-08-17

## 2024-08-16 RX ORDER — VENLAFAXINE HYDROCHLORIDE 75 MG/1
150 CAPSULE, EXTENDED RELEASE ORAL DAILY
Status: DISCONTINUED | OUTPATIENT
Start: 2024-08-16 | End: 2024-08-19 | Stop reason: HOSPADM

## 2024-08-16 RX ORDER — SODIUM CHLORIDE 0.9 % (FLUSH) 0.9 %
10 SYRINGE (ML) INJECTION EVERY 6 HOURS
Status: DISCONTINUED | OUTPATIENT
Start: 2024-08-16 | End: 2024-08-19 | Stop reason: HOSPADM

## 2024-08-16 RX ORDER — SODIUM CHLORIDE 0.9 % (FLUSH) 0.9 %
10 SYRINGE (ML) INJECTION
Status: DISCONTINUED | OUTPATIENT
Start: 2024-08-16 | End: 2024-08-19 | Stop reason: HOSPADM

## 2024-08-16 RX ORDER — MULTIVITAMIN
1 TABLET ORAL DAILY
COMMUNITY

## 2024-08-16 RX ORDER — ONDANSETRON 4 MG/1
4 TABLET, ORALLY DISINTEGRATING ORAL ONCE
Status: COMPLETED | OUTPATIENT
Start: 2024-08-16 | End: 2024-08-16

## 2024-08-16 RX ORDER — DIPHENHYDRAMINE HCL 25 MG
25 CAPSULE ORAL DAILY PRN
COMMUNITY

## 2024-08-16 RX ORDER — OXYCODONE HYDROCHLORIDE 5 MG/1
5 TABLET ORAL EVERY 6 HOURS PRN
Status: DISCONTINUED | OUTPATIENT
Start: 2024-08-16 | End: 2024-08-16

## 2024-08-16 RX ORDER — LACTULOSE 10 G/15ML
30 SOLUTION ORAL 3 TIMES DAILY
Status: DISCONTINUED | OUTPATIENT
Start: 2024-08-16 | End: 2024-08-19 | Stop reason: HOSPADM

## 2024-08-16 RX ADMIN — RIFAXIMIN 550 MG: 550 TABLET ORAL at 08:08

## 2024-08-16 RX ADMIN — Medication 10 ML: at 06:08

## 2024-08-16 RX ADMIN — LACTULOSE 30 G: 20 SOLUTION ORAL at 01:08

## 2024-08-16 RX ADMIN — LEVOTHYROXINE SODIUM 75 MCG: 75 TABLET ORAL at 08:08

## 2024-08-16 RX ADMIN — TACROLIMUS 0.5 MG: 0.5 CAPSULE ORAL at 08:08

## 2024-08-16 RX ADMIN — OXYCODONE 5 MG: 5 TABLET ORAL at 11:08

## 2024-08-16 RX ADMIN — LACTULOSE 30 G: 20 SOLUTION ORAL at 08:08

## 2024-08-16 RX ADMIN — RIFAXIMIN 550 MG: 550 TABLET ORAL at 02:08

## 2024-08-16 RX ADMIN — CEFTRIAXONE 2 G: 2 INJECTION, POWDER, FOR SOLUTION INTRAMUSCULAR; INTRAVENOUS at 01:08

## 2024-08-16 RX ADMIN — TACROLIMUS 0.5 MG: 0.5 CAPSULE ORAL at 06:08

## 2024-08-16 RX ADMIN — SODIUM CHLORIDE: 9 INJECTION, SOLUTION INTRAVENOUS at 03:08

## 2024-08-16 RX ADMIN — PANTOPRAZOLE SODIUM 40 MG: 40 TABLET, DELAYED RELEASE ORAL at 08:08

## 2024-08-16 RX ADMIN — LACTULOSE 30 G: 20 SOLUTION ORAL at 03:08

## 2024-08-16 RX ADMIN — ALBUMIN (HUMAN) 62.5 G: 12.5 SOLUTION INTRAVENOUS at 04:08

## 2024-08-16 RX ADMIN — ONDANSETRON 4 MG: 4 TABLET, ORALLY DISINTEGRATING ORAL at 08:08

## 2024-08-16 RX ADMIN — OXYCODONE 5 MG: 5 TABLET ORAL at 05:08

## 2024-08-16 RX ADMIN — VENLAFAXINE HYDROCHLORIDE 150 MG: 75 CAPSULE, EXTENDED RELEASE ORAL at 05:08

## 2024-08-16 NOTE — PLAN OF CARE
Inpatient Upgrade Note    Elda Hernandez has warranted treatment spanning two or more midnights of hospital level care for the management of  Altered Mental Status, Hyponatremia, and JACQUE . She continues to require IV antibiotics, daily labs, further testing/imaging, monitoring of vital signs, medication adjustments, and IV Albumin, Consultants care . Her condition is also complicated by the following comorbidities: Hypertension, Immunosuppression, and s/p Liver Transplant, Depression, MVP, Seizures, SIADH, Tricuspid valve prolapse .

## 2024-08-16 NOTE — ASSESSMENT & PLAN NOTE
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 Urine Na 12, Na 130, Cr 2.4, Urine Creatinine 66, Urine Protein < 7, Urine Osm 248, Serum Osm 282. Has not taken Lasix and Spironolactone since 7/31. Endorses fluid restricting to 1L/day. Given 500 mL NS in ED. Appears euvolemic on examination. Overall impression, unclear what is driving this hyponatremia at this time.      Recommendations:     [ ] Trend Serum Na with daily CMP

## 2024-08-16 NOTE — PLAN OF CARE
Pt arrived to [facility] room 3 via stretcher w/ transporter. AAO x3. Name//allergies/procedure verified. Pt will be monitored by RN throughout procedure.

## 2024-08-16 NOTE — SUBJECTIVE & OBJECTIVE
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    Arnold-Chiari malformation     Bacteremia 12/22/2023    Depression     Esophageal stricture     Essential tremor     Hypertension     Left bundle branch block     Liver fibrosis, transplanted liver 10/02/2018    Suggested on fibroscan 10/2/18    Migraine without aura     MVP (mitral valve prolapse)     Non-rheumatic mitral regurgitation 10/01/2018    Non-rheumatic tricuspid valve insufficiency 10/01/2018    Osteoporosis     Perforated abdominal viscus 09/04/2020    Recurrent urinary tract infection     Seizures     Shingles 2007    SIADH (syndrome of inappropriate ADH production)     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    Tricuspid valve prolapse     Urolithiasis     Von Gierke disease     s/p liver transplant       Past Surgical History:   Procedure Laterality Date    APPENDECTOMY  6/22/2007    APPLICATION OF WOUND VACUUM-ASSISTED CLOSURE DEVICE N/A 9/18/2020    Procedure: APPLICATION, WOUND VAC;  Surgeon: Zain Decker MD;  Location: [facility] OR [address] [place] [address];  Service: General;  Laterality: N/A;    COLONOSCOPY  5/13/2008    internal hemorrhoids    CRANIOTOMY      ESOPHAGOGASTRODUODENOSCOPY N/A 9/3/2020    Procedure: EGD (ESOPHAGOGASTRODUODENOSCOPY);  Surgeon: Tyrel Vergara MD;  Location: [facility];  Service: Endoscopy;  Laterality: N/A;    ESOPHAGOGASTRODUODENOSCOPY N/A 12/22/2023    Procedure: EGD (ESOPHAGOGASTRODUODENOSCOPY);  Surgeon: Jhony James MD;  Location: [address]);  Service: Endoscopy;  Laterality: N/A;    EXPLORATORY LAPAROTOMY  2020    due to perforated stomach    LAMINECTOMY  3/2001    LIVER TRANSPLANT  9/23/2002    OSSICULAR RECONSTRUCTION  10/4/1995    RIGHT REPLACEMENT PROSTHESIS for cholesteatoma    THYMECTOMY  5/2/2007    TONSILLECTOMY, ADENOIDECTOMY  1/21/2004    TOTAL ABDOMINAL HYSTERECTOMY  3/31/1994       Review of patient's allergies indicates:   Allergen Reactions    Codeine Itching      Other reaction(s): Itching    Lipitor [atorvastatin] Other (See Comments)     Other reaction(s): Muscle pain  Muscle cranmps    Morphine Itching     Other reaction(s): nausea and vomiting     Zoloft [sertraline] Other (See Comments)     Tremors/muscle spasms     Current Facility-Administered Medications   Medication Frequency    albumin human 25% bottle 60 g Daily    cefTRIAXone (ROCEPHIN) 2 g in D5W 100 mL IVPB (MB+) Q24H    dextrose 10% bolus 125 mL 125 mL PRN    dextrose 10% bolus 250 mL 250 mL PRN    glucagon (human recombinant) injection 1 mg PRN    glucose chewable tablet 16 g PRN    glucose chewable tablet 24 g PRN    lactulose 20 gram/30 mL solution Soln 30 g TID    levothyroxine tablet 75 mcg Daily    melatonin tablet 6 mg Nightly PRN    naloxone 0.4 mg/mL injection 0.02 mg PRN    oxyCODONE immediate release tablet 5 mg Q6H PRN    pantoprazole EC tablet 40 mg Daily    rifAXIMin tablet 550 mg BID    sodium chloride 0.9% flush 10 mL PRN    sodium chloride 0.9% flush 10 mL Q12H PRN    sodium chloride 0.9% flush 10 mL Q6H    And    sodium chloride 0.9% flush 10 mL PRN    tacrolimus capsule 0.5 mg BID     Current Outpatient Medications   Medication    clonazePAM (KLONOPIN) 0.5 MG tablet    fluorometholone 0.1% (FML) 0.1 % DrpS    furosemide (LASIX) 40 MG tablet    lactulose (CHRONULAC) 10 gram/15 mL solution    levothyroxine (SYNTHROID) 75 MCG tablet    magnesium oxide (MAG-OX) 400 mg (241.3 mg magnesium) tablet    pantoprazole (PROTONIX) 40 MG tablet    pravastatin (PRAVACHOL) 20 MG tablet    prochlorperazine (COMPAZINE) 10 MG tablet    ramipriL (ALTACE) 5 MG capsule    spironolactone (ALDACTONE) 50 MG tablet    tacrolimus (PROGRAF) 0.5 MG Cap    venlafaxine (EFFEXOR-XR) 150 MG Cp24     Family History       Problem Relation (Age of Onset)    Heart disease Mother    No Known Problems Father    Stroke Mother          Tobacco Use    Smoking status: Never    Smokeless tobacco: Never   Substance and Sexual Activity     Alcohol use: No    Drug use: No    Sexual activity: Not on file     Review of Systems  Objective:     Vital Signs (Most Recent):  Temp: 98.4 °F (36.9 °C) (08/16/24 0622)  Pulse: 80 (08/16/24 0602)  Resp: 13 (08/16/24 0602)  BP: (!) 141/67 (08/16/24 0602)  SpO2: 100 % (08/16/24 0602) Vital Signs (24h Range):  Temp:  [97.6 °F (36.4 °C)-99.1 °F (37.3 °C)] 98.4 °F (36.9 °C)  Pulse:  [71-80] 80  Resp:  [13-21] 13  SpO2:  [97 %-100 %] 100 %  BP: (104-168)/(65-79) 141/67     Weight: 61.7 kg (136 lb) (08/15/24 1350)  Body mass index is 27.47 kg/m².  Body surface area is 1.6 meters squared.    No intake/output data recorded.     Physical Exam     Significant Labs:  All labs within the past 24 hours have been reviewed.    Significant Imaging:  Labs: Reviewed  US: Reviewed  CT: Reviewed

## 2024-08-16 NOTE — CONSULTS
[facility]  Hepatology  Consult Note    Patient Name: Elda Hernandez  MRN: [id number]  Admission Date: 8/15/2024  Hospital Length of Stay: 0 days  Code Status: Full Code   Attending Provider: Tyrel Mcdonald, *   Consulting Provider: Kimberlee Cervantes MD  Primary Care Physician: David Lorenzo MD  Principal Problem:<principal problem not specified>    Inpatient consult to Hepatology  Consult performed by: Kimberlee Cervantes MD  Consult ordered by: Carlos Solomon DO        Subjective:     HPI: Elda Hernandez is a 54 y.o. 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 She is unable to provide a meaningful history because she is altered. No family at bedside. She reports that she had 1 episode of nausea and vomiting last night. Also c/o of abdominal pain and headache.     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    Arnold-Chiari malformation     Bacteremia 12/22/2023    Depression     Esophageal stricture     Essential tremor     Hypertension     Left bundle branch block     Liver fibrosis, transplanted liver 10/02/2018    Suggested on fibroscan 10/2/18    Migraine without aura      MVP (mitral valve prolapse)     Non-rheumatic mitral regurgitation 10/01/2018    Non-rheumatic tricuspid valve insufficiency 10/01/2018    Osteoporosis     Perforated abdominal viscus 09/04/2020    Recurrent urinary tract infection     Seizures     Shingles 2007    SIADH (syndrome of inappropriate ADH production)     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    Tricuspid valve prolapse     Urolithiasis     Von Gierke disease     s/p liver transplant       Past Surgical History:   Procedure Laterality Date    APPENDECTOMY  6/22/2007    APPLICATION OF WOUND VACUUM-ASSISTED CLOSURE DEVICE N/A 9/18/2020    Procedure: APPLICATION, WOUND VAC;  Surgeon: Zain Decker MD;  [facility];  Service: General;  Laterality: N/A;    COLONOSCOPY  5/13/2008    internal hemorrhoids    CRANIOTOMY      ESOPHAGOGASTRODUODENOSCOPY N/A 9/3/2020    Procedure: EGD (ESOPHAGOGASTRODUODENOSCOPY);  Surgeon: Tyrel Vergara MD;  Location: [facility];  Service: Endoscopy;  Laterality: N/A;    ESOPHAGOGASTRODUODENOSCOPY N/A 12/22/2023    Procedure: EGD (ESOPHAGOGASTRODUODENOSCOPY);  Surgeon: Jhony James MD;  Location: [facility] ([address] [place] [address]);  Service: Endoscopy;  Laterality: N/A;    EXPLORATORY LAPAROTOMY  2020    due to perforated stomach    LAMINECTOMY  3/2001    LIVER TRANSPLANT  9/23/2002    OSSICULAR RECONSTRUCTION  10/4/1995    RIGHT REPLACEMENT PROSTHESIS for cholesteatoma    THYMECTOMY  5/2/2007    TONSILLECTOMY, ADENOIDECTOMY  1/21/2004    TOTAL ABDOMINAL HYSTERECTOMY  3/31/1994       Family History   Problem Relation Name Age of Onset    Stroke Mother      Heart disease Mother      No Known Problems Father         Social History     Socioeconomic History    Marital status:    Tobacco Use    Smoking status: Never    Smokeless tobacco: Never   Substance and Sexual Activity    Alcohol use: No    Drug use: No     Social Determinants of Health     Financial Resource Strain: Low Risk  (7/30/2024)    Overall  Financial Resource Strain (CARDIA)     Difficulty of Paying Living Expenses: Not hard at all   Recent Concern: Financial Resource Strain - High Risk (7/22/2024)    Overall Financial Resource Strain (CARDIA)     Difficulty of Paying Living Expenses: Hard   Food Insecurity: No Food Insecurity (7/30/2024)    Hunger Vital Sign     Worried About Running Out of Food in the Last Year: Never true     Ran Out of Food in the Last Year: Never true   Transportation Needs: No Transportation Needs (7/30/2024)    TRANSPORTATION NEEDS     Transportation : No   Physical Activity: Inactive (7/28/2024)    Exercise Vital Sign     Days of Exercise per Week: 0 days     Minutes of Exercise per Session: 0 min   Stress: No Stress Concern Present (7/30/2024)    [employer] - Occupational Stress Questionnaire     Feeling of Stress : Not at all   Recent Concern: Stress - Stress Concern Present (7/22/2024)    [employer] - Occupational Stress Questionnaire     Feeling of Stress : Very much   Housing Stability: Low Risk  (7/30/2024)    Housing Stability Vital Sign     Unable to Pay for Housing in the Last Year: No     Homeless in the Last Year: No       No current facility-administered medications on file prior to encounter.     Current Outpatient Medications on File Prior to Encounter   Medication Sig Dispense Refill    clonazePAM (KLONOPIN) 0.5 MG tablet Take 0.5 tablets (0.25 mg total) by mouth 2 (two) times daily. 30 tablet 0    fluorometholone 0.1% (FML) 0.1 % DrpS Place 2 drops into the left eye 2 (two) times daily.      furosemide (LASIX) 40 MG tablet Take 0.5 tablets (20 mg total) by mouth once daily. Hold until you talk to your hepatologist.      lactulose (CHRONULAC) 10 gram/15 mL solution Take 15 mLs (10 g total) by mouth 3 (three) times daily. 1892 mL 6    levothyroxine (SYNTHROID) 75 MCG tablet Take 1 tablet (75 mcg total) by mouth once daily. 90 tablet 3    magnesium oxide  (MAG-OX) 400 mg (241.3 mg magnesium) tablet TAKE 1 TABLET(400 MG) BY MOUTH TWICE DAILY 180 tablet 3    pantoprazole (PROTONIX) 40 MG tablet Take 40 mg by mouth once daily.      pravastatin (PRAVACHOL) 20 MG tablet Take 1 tablet (20 mg total) by mouth once daily. Need OFFICE VISIT before next refill, last seen 9/2023. This will be the LAST Rx if visit is not made. 90 tablet 1    prochlorperazine (COMPAZINE) 10 MG tablet Take 1 tablet (10 mg total) by mouth every 6 (six) hours as needed (migraine or nausea). 60 tablet 11    ramipriL (ALTACE) 5 MG capsule Take 1 capsule (5 mg total) by mouth once daily. 90 capsule 3    spironolactone (ALDACTONE) 50 MG tablet Take 1 tablet (50 mg total) by mouth once daily. Hold until you talk to your hepatologist.      tacrolimus (PROGRAF) 0.5 MG Cap Take 1 capsule (0.5 mg total) by mouth every 12 (twelve) hours. 180 capsule 3    venlafaxine (EFFEXOR-XR) 150 MG Cp24 Take 1 capsule (150 mg total) by mouth once daily. 90 capsule 3       Review of patient's allergies indicates:   Allergen Reactions    Codeine Itching     Other reaction(s): Itching    Lipitor [atorvastatin] Other (See Comments)     Other reaction(s): Muscle pain  Muscle cranmps    Morphine Itching     Other reaction(s): nausea and vomiting     Zoloft [sertraline] Other (See Comments)     Tremors/muscle spasms       Review of Systems   Constitutional: Negative.    Respiratory:  Negative for cough and shortness of breath.    Cardiovascular:  Negative for chest pain and leg swelling.   Gastrointestinal:  Positive for abdominal pain, nausea and vomiting.   Genitourinary:  Negative for frequency and urgency.   Neurological:  Positive for headaches.        Objective:     Vitals:    08/16/24 1115   BP:    Pulse: 75   Resp: 19   Temp:          Constitutional:  not in acute distress, oriented to time and place, and well developed  HENT: Head: Normal, normocephalic, atraumatic.  Eyes: conjunctiva clear and sclera  icteric  Cardiovascular: regular rate and rhythm and no murmur  Respiratory: normal chest expansion & respiratory effort   and no accessory muscle use  GI: soft, non-tender, without masses or organomegaly  Musculoskeletal: no muscular tenderness noted  Skin: jaundice present  Neurological: alert, oriented x2 confused, minimal verbalization  Psychiatric: mood and affect are within normal limits, confused    Significant Labs:  Recent Labs   Lab 08/10/24  0918 08/15/24  1921 08/16/24  0444   HGB 8.4* 8.2* 7.4*       Lab Results   Component Value Date    WBC 2.68 (L) 08/16/2024    HGB 7.4 (L) 08/16/2024    HCT 22.4 (L) 08/16/2024    MCV 88 08/16/2024    PLT 50 (L) 08/16/2024       Lab Results   Component Value Date     (L) 08/16/2024     (L) 08/16/2024    K 4.3 08/16/2024    K 4.3 08/16/2024     08/16/2024     08/16/2024    CO2 18 (L) 08/16/2024    CO2 18 (L) 08/16/2024    BUN 37 (H) 08/16/2024    BUN 37 (H) 08/16/2024    CREATININE 2.4 (H) 08/16/2024    CREATININE 2.4 (H) 08/16/2024    CALCIUM 8.6 (L) 08/16/2024    CALCIUM 8.6 (L) 08/16/2024    ANIONGAP 8 08/16/2024    ANIONGAP 8 08/16/2024    ESTGFRAFRICA >60.0 05/28/2022    EGFRNONAA >60.0 05/28/2022       Lab Results   Component Value Date    ALT 33 08/16/2024    AST 53 (H) 08/16/2024     (H) 04/07/2018    ALKPHOS 359 (H) 08/16/2024    BILITOT 3.5 (H) 08/16/2024       Lab Results   Component Value Date    INR 1.3 (H) 08/16/2024    INR 1.9 (H) 08/15/2024    INR 1.3 (H) 08/10/2024       Significant Imaging:  Reviewed pertinent radiology findings.       Assessment/Plan:     Assessment:    Elda Hernandez is a 54 y.o. female with history of 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 in 2002 (on chronic immunosuppression with tacrolimus), cirrhosis of transplant liver with portal hypertension, trace ascites, hepatic encephalopathy, admitted for hyponatremia, confusion. Imaging and labs suggestive of rejection and cirrhosis of the  transplanted liver.       Recommendations:    Has had complicated history and is not a candidate for repeat transplant due to multiple co morbid conditions and repeated abdominal surgeries. Unfortunately, there's no further treatment options for her. Would recommend palliative medicine for her.     Thank you for involving us in the care of Elda Hernandez. Please call with any additional questions, concerns or changes in the patient's clinical status. We will continue to follow.     Kimberlee Cervantes MD  Gastroenterology & Hepatology, PGY 2  [facility]

## 2024-08-16 NOTE — ASSESSMENT & PLAN NOTE
MELD 3.0: 34 at 8/15/2024  7:21 PM  MELD-Na: 31 at 8/15/2024  7:21 PM  Calculated from:  Serum Creatinine: 2.2 mg/dL at 8/15/2024  7:21 PM  Serum Sodium: 126 mmol/L at 8/15/2024  7:21 PM  Total Bilirubin: 3.9 mg/dL at 8/15/2024  7:21 PM  Serum Albumin: 2.5 g/dL at 8/15/2024  7:21 PM  INR(ratio): 1.9 at 8/15/2024  7:21 PM  Age at listin years  Sex: Female at 8/15/2024  7:21 PM    Plan  Daily liver labs (CMP, Coags)  Ammonia 59, Lactulose TID, and Rifaximin   Hep consulted  Continue Immuno suppression.   ED did pocus, unable to tap. Will consider SBP labs with IR in AM.

## 2024-08-16 NOTE — ASSESSMENT & PLAN NOTE
JACQUE improving on 8/17. Patient with known CKD stage 3a at baseline with baseline creatinine around 1.5. Creatinine 2.2 on admit and went to 2.4 on 8/16 but down to 1.7 on 8/17.   Nephrology consulted and appreciate recs and unlikely due to HRS with patient having elevated blood pressures. Suspect JACQUE related to pre-renal azotemia due to hypovolemia at this time.  Plan:  - Patient given 1 L Normal saline over 10 hrs and IV Albumin by Nephrology and appreciate recs.   - Urine studies consistent with prerenal azotemia.   - Monitor renal function with daily BMPs.   - Strict I&Os, close monitoring of UOP.  - Replace electrolytes PRN, goal K/Phos/Mg 4/3/2  [ ] Avoid nephrotoxic agents when feasible (NSAIDs, ACEi/ARB, IV radiocontrast, gadolinium, etc.)  [ ] Renally dose all meds to eGFR  [ ] Goal MAP > 65 mmHg  [ ] No acute indications for RRT at this time

## 2024-08-16 NOTE — ASSESSMENT & PLAN NOTE
AMS + headache in setting of abdominal pain concerning for SBP, Unknown ammonia level w/ last BM yesterday, and hyponatremia.     Plan  CT Head to evaluate for edema- Neg  Q4 BMP   ABX for possible infection (?SBP)  Ammonia 59, Lactulose TID +Rifax; downtitrate to 3 BMs

## 2024-08-16 NOTE — ASSESSMENT & PLAN NOTE
MELD 3.0: 23 at 2024  9:33 AM  MELD-Na: 21 at 2024  9:33 AM  Calculated from:  Serum Creatinine: 1.7 mg/dL at 2024  9:33 AM  Serum Sodium: 138 mmol/L (Using max of 137 mmol/L) at 2024  9:33 AM  Total Bilirubin: 3.6 mg/dL at 2024  4:40 AM  Serum Albumin: 3.0 g/dL at 2024  4:40 AM  INR(ratio): 1.5 at 2024  4:40 AM  Age at listin years  Sex: Female at 2024  9:33 AM    Plan:  Daily liver labs (CMP, Coags) to monitor synthetic function.   Continue home Lactulose (titrate to 3-4 BMs a day) and Rifaximin to treat known hepatic encephalopathy. Ammonia 59 on admit.   Hepatology consulted and following and appreciate assistance.   Patient not a candidate for repeat transplant due to multiple co morbid conditions and repeated abdominal surgeries. Unfortunately, there's no further treatment options for her. They recommend palliative medicine for her.

## 2024-08-16 NOTE — ASSESSMENT & PLAN NOTE
Concerned of SBP given AMS, Abdominal pain, tenderness/mild guarding to palpation.   ED POCUS unable to show safe pocket for Para; IR consulted  Will give Ceftriaxone to cover for possible SBP  Blood cultures ordered     I have reviewed and confirmed nurses' notes for patient's medications, allergies, medical history, and surgical history.

## 2024-08-16 NOTE — ASSESSMENT & PLAN NOTE
Elevated sCr, down from recent hospitalization 2 weeks ago, up from baseline of 1. Urine low sodium, normal osm,   Concerned of HRS syndrome, Nephro consulted & discussed w/ on call fellow  1g/kg Albumin for possible type 2 HRS.   Normotensive, will hold off on Midodrine and Octreotide and Terlipressin for now.   Euvolemic, will hold off on fluids

## 2024-08-16 NOTE — ASSESSMENT & PLAN NOTE
Baseline Cr 1-1.3, Cr 2.2 on admission  Hx of CKD3  US Retro without hydronephrosis  Unlikely d/t HRS with patient having elevated blood pressures   Suspect pre-renal at this time     Recommendation:   [ ] Would give 1L NS over 10 hrs  [ ] Obtain urine sample and spin it   [ ] Trend Cr/ Serial BMPs  [ ] Strict I&Os, close monitoring of UOP  [ ] Replace electrolytes PRN, goal K/Phos/Mg 4/3/2  [ ] Avoid nephrotoxic agents when feasible (NSAIDs, ACEi/ARB, IV radiocontrast, gadolinium, etc.)  [ ] Renally dose all meds to eGFR  [ ] Goal MAP > 65 mmHg  [ ] No acute indications for RRT at this time

## 2024-08-16 NOTE — H&P
Inpatient Radiology Pre-procedure Note    History of Present Illness:  Elda Hernandez is a 54 y.o. female who presents for US guided paracentesis.    Admission H&P reviewed.  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    Arnold-Chiari malformation     Bacteremia 12/22/2023    Depression     Esophageal stricture     Essential tremor     Hypertension     Left bundle branch block     Liver fibrosis, transplanted liver 10/02/2018    Suggested on fibroscan 10/2/18    Migraine without aura     MVP (mitral valve prolapse)     Non-rheumatic mitral regurgitation 10/01/2018    Non-rheumatic tricuspid valve insufficiency 10/01/2018    Osteoporosis     Perforated abdominal viscus 09/04/2020    Recurrent urinary tract infection     Seizures     Shingles 2007    SIADH (syndrome of inappropriate ADH production)     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    Tricuspid valve prolapse     Urolithiasis     Von Gierke disease     s/p liver transplant     Past Surgical History:   Procedure Laterality Date    APPENDECTOMY  6/22/2007    APPLICATION OF WOUND VACUUM-ASSISTED CLOSURE DEVICE N/A 9/18/2020    Procedure: APPLICATION, WOUND VAC;  Surgeon: Zain Decker MD;  Location: [facility] OR [address] [place] [address];  Service: General;  Laterality: N/A;    COLONOSCOPY  5/13/2008    internal hemorrhoids    CRANIOTOMY      ESOPHAGOGASTRODUODENOSCOPY N/A 9/3/2020    Procedure: EGD (ESOPHAGOGASTRODUODENOSCOPY);  Surgeon: Tyrel Vergara MD;  [facility];  Service: Endoscopy;  Laterality: N/A;    ESOPHAGOGASTRODUODENOSCOPY N/A 12/22/2023    Procedure: EGD (ESOPHAGOGASTRODUODENOSCOPY);  Surgeon: Jhony James MD;  Location: [address]);  Service: Endoscopy;  Laterality: N/A;    EXPLORATORY LAPAROTOMY  2020    due to perforated stomach    LAMINECTOMY  3/2001    LIVER TRANSPLANT  9/23/2002    OSSICULAR RECONSTRUCTION  10/4/1995    RIGHT REPLACEMENT PROSTHESIS for cholesteatoma    THYMECTOMY  5/2/2007     TONSILLECTOMY, ADENOIDECTOMY  1/21/2004    TOTAL ABDOMINAL HYSTERECTOMY  3/31/1994       Review of Systems:   As documented in primary team H&P    Home Meds:   Prior to Admission medications    Medication Sig Start Date End Date Taking? Authorizing Provider   diphenhydrAMINE (BENADRYL) 25 mg capsule Take 25 mg by mouth daily as needed for Allergies.   Yes Provider, Historical   lactulose (CHRONULAC) 10 gram/15 mL solution Take 15 mLs (10 g total) by mouth 3 (three) times daily.  Patient taking differently: Take 10 g by mouth 3 (three) times daily as needed (Constipation). 1/10/24  Yes Sangita Hayes MD   levothyroxine (SYNTHROID) 75 MCG tablet Take 1 tablet (75 mcg total) by mouth once daily. 8/13/23  Yes David Lorenzo MD   magnesium oxide (MAG-OX) 400 mg (241.3 mg magnesium) tablet TAKE 1 TABLET(400 MG) BY MOUTH TWICE DAILY 8/13/23  Yes David Lorenzo MD   multivitamin (ONE DAILY MULTIVITAMIN) per tablet Take 1 tablet by mouth once daily.   Yes Provider, Historical   pantoprazole (PROTONIX) 20 MG tablet Take 20 mg by mouth once daily.   Yes Provider, Historical   pravastatin (PRAVACHOL) 20 MG tablet Take 1 tablet (20 mg total) by mouth once daily. Need OFFICE VISIT before next refill, last seen 9/2023. This will be the LAST Rx if visit is not made. 4/8/24  Yes Dave Upton MD   tacrolimus (PROGRAF) 0.5 MG Cap Take 1 capsule (0.5 mg total) by mouth every 12 (twelve) hours. 9/28/23  Yes Jaya Nielsen MD   venlafaxine (EFFEXOR-XR) 150 MG Cp24 Take 1 capsule (150 mg total) by mouth once daily. 8/13/23  Yes David Lorenzo MD   clonazePAM (KLONOPIN) 0.5 MG tablet Take 0.5 tablets (0.25 mg total) by mouth 2 (two) times daily.  Patient not taking: Reported on 8/16/2024 8/9/24 9/8/24  David Lorenzo MD   furosemide (LASIX) 40 MG tablet Take 0.5 tablets (20 mg total) by mouth once daily. Hold until you talk to your hepatologist.  Patient not taking: Reported on 8/16/2024 7/31/24 7/31/25  Salinas Mullen MD    fluorometholone 0.1% (FML) 0.1 % DrpS Place 2 drops into the left eye 2 (two) times daily.  8/16/24  Provider, [place]   prochlorperazine (COMPAZINE) 10 MG tablet Take 1 tablet (10 mg total) by mouth every 6 (six) hours as needed (migraine or nausea). 11/14/23 8/16/24  Constance Duffy NP   ramipriL (ALTACE) 5 MG capsule Take 1 capsule (5 mg total) by mouth once daily. 8/13/23 8/16/24  David Lorenzo MD   spironolactone (ALDACTONE) 50 MG tablet Take 1 tablet (50 mg total) by mouth once daily. Hold until you talk to your hepatologist. 7/31/24 8/16/24  Salinas Mullen MD     Scheduled Meds:    albumin human 25%  60 g Intravenous Daily    cefTRIAXone (Rocephin) IV (PEDS and ADULTS)  2 g Intravenous Q24H    lactulose  30 g Oral TID    levothyroxine  75 mcg Oral Daily    pantoprazole  40 mg Oral Daily    rifAXImin  550 mg Oral BID    sodium chloride 0.9%  10 mL Intravenous Q6H    tacrolimus  0.5 mg Oral BID     Continuous Infusions:   PRN Meds:  Current Facility-Administered Medications:     dextrose 10%, 12.5 g, Intravenous, PRN    dextrose 10%, 25 g, Intravenous, PRN    glucagon (human recombinant), 1 mg, Intramuscular, PRN    glucose, 16 g, Oral, PRN    glucose, 24 g, Oral, PRN    melatonin, 6 mg, Oral, Nightly PRN    naloxone, 0.02 mg, Intravenous, PRN    oxyCODONE, 5 mg, Oral, Q6H PRN    sodium chloride 0.9%, 10 mL, Intravenous, PRN    sodium chloride 0.9%, 10 mL, Intravenous, Q12H PRN    Flushing PICC/Midline Protocol, , , Until Discontinued **AND** sodium chloride 0.9%, 10 mL, Intravenous, Q6H **AND** sodium chloride 0.9%, 10 mL, Intravenous, PRN  Anticoagulants/Antiplatelets: no anticoagulation    Allergies:   Review of patient's allergies indicates:   Allergen Reactions    Codeine Itching     Other reaction(s): Itching    Lipitor [atorvastatin] Other (See Comments)     Other reaction(s): Muscle pain  Muscle cranmps    Morphine Itching     Other reaction(s): nausea and vomiting     Zoloft [sertraline]  Other (See Comments)     Tremors/muscle spasms     Sedation Hx: have not been any systemic reactions    Vitals:  Temp: 98.8 °F (37.1 °C) (08/16/24 0700)  Pulse: 79 (08/16/24 1248)  Resp: 18 (08/16/24 1248)  BP: (!) 159/72 (08/16/24 1248)  SpO2: 100 % (08/16/24 1248)     Physical Exam:  ASA: 2  Mallampati: n/a    General: no acute distress  Mental Status: alert and oriented to person, place and time  HEENT: normocephalic, atraumatic  Chest: unlabored breathing  Heart: regular heart rate  Abdomen: distended  Extremity: moves all extremities    Plan: US guided paracentesis  Sedation Plan: Local    Diana Hewitt PA-C  Interventional Radiology  [phone]

## 2024-08-16 NOTE — ASSESSMENT & PLAN NOTE
Creatine stable for now. BMP reviewed- noted Estimated Creatinine Clearance: 24.3 mL/min (A) (based on SCr of 2.2 mg/dL (H)). according to latest data. Based on current GFR, CKD stage is stage 4 - GFR 15-29.  Monitor UOP and serial BMP and adjust therapy as needed. Renally dose meds. Avoid nephrotoxic medications and procedures.    JACQUE from 2-3 weeks ago appears resolved and back to baseline renal function. Will consider diuresis if indicated

## 2024-08-16 NOTE — CONSULTS
[facility] - Emergency Dept  Nephrology  Consult Note    Patient Name: Elda Hernandez  MRN: [id number]  Admission Date: 8/15/2024  Hospital Length of Stay: 0 days  Attending Provider: Tyrel Mcdonald, *   Primary Care Physician: David Lorenzo MD  Principal Problem:<principal problem not specified>    Inpatient consult to Nephrology  Consult performed by: Samad, Mawadah, MD  Consult ordered by: Carlos Solomon DO        Subjective:     HPI: 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 2002 (on chronic immunosuppression with tacrolimus), cirrhosis of transplant liver with portal hypertension, trace ascites, hepatic encephalopathy, Arnold-Chiari malformation, migraines, history of craniotomy, possible SIADH with chronic hyponatremia, hypertension, left bundle branch block, chronic kidney disease stage 3 (baseline Cr 1-1.3), and kidney angiolipoma.  She reports that she had 1 episode of nausea and vomiting last night. Also c/o of abdominal pain and headache, the later of which she says is related to her low Na.      In ED, HDS, AF, Na 126, CO2 18, BUN 38, Cr 2.2, , Protein 5.8, Bili 3.9, Direct 2.8, AST 61, Alt 34, Ammonia 59, Serum Osm 282, Ua w/ trace LE, 1.010 speficic grav, U cr 55, U protein <7, U Na 12, U Osm 248. Head CT nothing acute, Liver US shows Mildly elevated intrahepatic arterial resistive indices, noting improvement from 06/17/2024, which can be seen in transplant rejection or chronic liver disease. Unremarkable RP US.     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    Arnold-Chiari malformation     Bacteremia 12/22/2023    Depression     Esophageal stricture     Essential tremor     Hypertension     Left bundle branch block     Liver fibrosis, transplanted liver 10/02/2018    Suggested on fibroscan 10/2/18    Migraine without aura     MVP (mitral  valve prolapse)     Non-rheumatic mitral regurgitation 10/01/2018    Non-rheumatic tricuspid valve insufficiency 10/01/2018    Osteoporosis     Perforated abdominal viscus 09/04/2020    Recurrent urinary tract infection     Seizures     Shingles 2007    SIADH (syndrome of inappropriate ADH production)     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    Tricuspid valve prolapse     Urolithiasis     Von Gierke disease     s/p liver transplant       Past Surgical History:   Procedure Laterality Date    APPENDECTOMY  6/22/2007    APPLICATION OF WOUND VACUUM-ASSISTED CLOSURE DEVICE N/A 9/18/2020    Procedure: APPLICATION, WOUND VAC;  Surgeon: Zain Decker MD;  Location: [facility] OR [facility];  Service: General;  Laterality: N/A;    COLONOSCOPY  5/13/2008    internal hemorrhoids    CRANIOTOMY      ESOPHAGOGASTRODUODENOSCOPY N/A 9/3/2020    Procedure: EGD (ESOPHAGOGASTRODUODENOSCOPY);  Surgeon: Tyrel Vergara MD;  Location: [facility];  Service: Endoscopy;  Laterality: N/A;    ESOPHAGOGASTRODUODENOSCOPY N/A 12/22/2023    Procedure: EGD (ESOPHAGOGASTRODUODENOSCOPY);  Surgeon: Jhony James MD;  [facility]  Service: Endoscopy;  Laterality: N/A;    EXPLORATORY LAPAROTOMY  2020    due to perforated stomach    LAMINECTOMY  3/2001    LIVER TRANSPLANT  9/23/2002    OSSICULAR RECONSTRUCTION  10/4/1995    RIGHT REPLACEMENT PROSTHESIS for cholesteatoma    THYMECTOMY  5/2/2007    TONSILLECTOMY, ADENOIDECTOMY  1/21/2004    TOTAL ABDOMINAL HYSTERECTOMY  3/31/1994       Review of patient's allergies indicates:   Allergen Reactions    Codeine Itching     Other reaction(s): Itching    Lipitor [atorvastatin] Other (See Comments)     Other reaction(s): Muscle pain  Muscle cranmps    Morphine Itching     Other reaction(s): nausea and vomiting     Zoloft [sertraline] Other (See Comments)     Tremors/muscle spasms     Current Facility-Administered Medications   Medication Frequency    albumin human 25% bottle 60 g  Daily    cefTRIAXone (ROCEPHIN) 2 g in D5W 100 mL IVPB (MB+) Q24H    dextrose 10% bolus 125 mL 125 mL PRN    dextrose 10% bolus 250 mL 250 mL PRN    glucagon (human recombinant) injection 1 mg PRN    glucose chewable tablet 16 g PRN    glucose chewable tablet 24 g PRN    lactulose 20 gram/30 mL solution Soln 30 g TID    levothyroxine tablet 75 mcg Daily    melatonin tablet 6 mg Nightly PRN    naloxone 0.4 mg/mL injection 0.02 mg PRN    oxyCODONE immediate release tablet 5 mg Q6H PRN    pantoprazole EC tablet 40 mg Daily    rifAXIMin tablet 550 mg BID    sodium chloride 0.9% flush 10 mL PRN    sodium chloride 0.9% flush 10 mL Q12H PRN    sodium chloride 0.9% flush 10 mL Q6H    And    sodium chloride 0.9% flush 10 mL PRN    tacrolimus capsule 0.5 mg BID     Current Outpatient Medications   Medication    clonazePAM (KLONOPIN) 0.5 MG tablet    fluorometholone 0.1% (FML) 0.1 % DrpS    furosemide (LASIX) 40 MG tablet    lactulose (CHRONULAC) 10 gram/15 mL solution    levothyroxine (SYNTHROID) 75 MCG tablet    magnesium oxide (MAG-OX) 400 mg (241.3 mg magnesium) tablet    pantoprazole (PROTONIX) 40 MG tablet    pravastatin (PRAVACHOL) 20 MG tablet    prochlorperazine (COMPAZINE) 10 MG tablet    ramipriL (ALTACE) 5 MG capsule    spironolactone (ALDACTONE) 50 MG tablet    tacrolimus (PROGRAF) 0.5 MG Cap    venlafaxine (EFFEXOR-XR) 150 MG Cp24     Family History       Problem Relation (Age of Onset)    Heart disease Mother    No Known Problems Father    Stroke Mother          Tobacco Use    Smoking status: Never    Smokeless tobacco: Never   Substance and Sexual Activity    Alcohol use: No    Drug use: No    Sexual activity: Not on file     Review of Systems  Objective:     Vital Signs (Most Recent):  Temp: 98.4 °F (36.9 °C) (08/16/24 0622)  Pulse: 80 (08/16/24 0602)  Resp: 13 (08/16/24 0602)  BP: (!) 141/67 (08/16/24 0602)  SpO2: 100 % (08/16/24 0602) Vital Signs (24h Range):  Temp:  [97.6 °F (36.4 °C)-99.1 °F (37.3 °C)]  98.4 °F (36.9 °C)  Pulse:  [71-80] 80  Resp:  [13-21] 13  SpO2:  [97 %-100 %] 100 %  BP: (104-168)/(65-79) 141/67     Weight: 61.7 kg (136 lb) (08/15/24 1350)  Body mass index is 27.47 kg/m².  Body surface area is 1.6 meters squared.    No intake/output data recorded.     Physical Exam     Significant Labs:  All labs within the past 24 hours have been reviewed.    Significant Imaging:  Labs: Reviewed  US: Reviewed  CT: Reviewed  Assessment/Plan:     Renal/  JACQUE (acute kidney injury)  Baseline Cr 1-1.3, Cr 2.2 on admission  Hx of CKD3  US Retro without hydronephrosis  Unlikely d/t HRS with patient having elevated blood pressures   Suspect pre-renal at this time          Recommendation:   [ ] Would give 1L NS over 10 hrs  [ ] Obtain urine sample and spin it   [ ] Trend Cr/ Serial BMPs  [ ] Strict I&Os, close monitoring of UOP  [ ] Replace electrolytes PRN, goal K/Phos/Mg 4/3/2  [ ] Avoid nephrotoxic agents when feasible (NSAIDs, ACEi/ARB, IV radiocontrast, gadolinium, etc.)  [ ] Renally dose all meds to eGFR  [ ] Goal MAP > 65 mmHg  [ ] No acute indications for RRT at this time      Endocrine  Hyponatremia  54F PMHx most significant for Von Gierke disease s/p liver transplant 2002 (on tacrolimus), cirrhosis of transplant liver w/portal hypertension, hepatic encephalopathy, and Arnold Chiari malformation, CKD3 (baseline 1-1.3), and chronic hyponatremia 2/2 SIADH. Urine Na 12, Na 130, Cr 2.4, Urine Creatinine 66, Urine Protein < 7, Urine Osm 248, Serum Osm 282. Has not taken Lasix and Spironolactone since 7/31. Endorses fluid restricting to 1L/day. Given 500 mL NS in ED. Appears euvolemic on examination. Overall impression, unclear what is driving this hyponatremia at this time.      Recommendations:     [ ] Trend Serum Na with daily CMP          Final recommendations per attending attestation to follow.     Thank you for your consult. I will follow-up with patient. Please contact us if you have any additional  questions.    Mawadah Samad, MD  Nephrology  [facility] - Emergency Dept

## 2024-08-16 NOTE — ASSESSMENT & PLAN NOTE
Hyponatremia is likely due to unclear etiology. The patient's most recent sodium results are listed below.  Recent Labs     08/15/24  1921   *     Hx of cirrhosis, SIADH, JACQUE now resolved.   Transplant rec fluid restrict and hold diuertics (on 8/12), given 400ml IVMF in ED. 0000 Repeat flat @ 126.     Plan  Appears Euvolemic on exam.   Normotonic Serum Osm  Urine studies: U Cr 55, U Protein <7, Na 12, Osm 248  Nephro consult, discussed w/ on call fellow, will give 1g/kg Albumin for possible type 2 HRS.

## 2024-08-16 NOTE — PLAN OF CARE
Post procedure report given to receiving nurseErick. Pt returning to assigned room via stretcher w/ transporter.

## 2024-08-16 NOTE — PHARMACY MED REC
"  Admission Medication History     The home medication history was taken by Ava Florian.    You may go to "Admission" then "Reconcile Home Medications" tabs to review and/or act upon these items.     The home medication list has been updated by the Pharmacy department.   Please read ALL comments highlighted in yellow.   Please address this information as you see fit.    Feel free to contact us if you have any questions or require assistance.      The medications listed below were removed from the home medication list. Please reorder if appropriate:  Patient reports no longer taking the following medication(s):  FLUOROMETHOLONE 0.1 % DRPS  PROCHLORPERAZINE 10 MG  RAMIPRIL 5 MG  SPIRONOLACTONE 50 MG    Medications listed below were obtained from: Patient/family and Analytic software- [employer]  Current Outpatient Medications on File Prior to Encounter   Medication Sig    diphenhydrAMINE (BENADRYL) 25 mg capsule   Take 25 mg by mouth daily as needed for Allergies.    lactulose (CHRONULAC) 10 gram/15 mL solution   Take 15 mLs (10 g total) by mouth 3 (three) times daily as needed (Constipation).    levothyroxine (SYNTHROID) 75 MCG tablet   Take 1 tablet (75 mcg total) by mouth once daily.    magnesium oxide (MAG-OX) 400 mg (241.3 mg   magnesium) tablet   TAKE 1 TABLET(400 MG) BY MOUTH TWICE DAILY    multivitamin (ONE DAILY MULTIVITAMIN) per tablet   Take 1 tablet by mouth once daily.    pantoprazole (PROTONIX) 20 MG tablet   Take 20 mg by mouth once daily.    pravastatin (PRAVACHOL) 20 MG tablet   Take 1 tablet (20 mg total) by mouth once daily. Last filled 4/8/24, #90/90 ds      tacrolimus (PROGRAF) 0.5 MG Cap Take 1 capsule (0.5 mg total) by mouth every 12 (twelve) hours.      venlafaxine (EFFEXOR-XR) 150 MG Cp24   Take 1 capsule (150 mg total) by mouth once daily.    furosemide (LASIX) 40 MG tablet Take 0.5 tablets (20 mg total) by mouth once daily. Hold until you talk to your hepatologist. (Patient not taking: " Reported on 8/16/2024). Last filled 6/17/24, #90/90 ds       Potential issues to be addressed PRIOR TO DISCHARGE  Patient reported not taking the following medications: (KLONIPIN). These medications remain on the home medication list. Please address accordingly.             Ava Florian  EXT [id number]                .

## 2024-08-16 NOTE — SUBJECTIVE & OBJECTIVE
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    Arnold-Chiari malformation     Bacteremia 12/22/2023    Depression     Esophageal stricture     Essential tremor     Hypertension     Left bundle branch block     Liver fibrosis, transplanted liver 10/02/2018    Suggested on fibroscan 10/2/18    Migraine without aura     MVP (mitral valve prolapse)     Non-rheumatic mitral regurgitation 10/01/2018    Non-rheumatic tricuspid valve insufficiency 10/01/2018    Osteoporosis     Perforated abdominal viscus 09/04/2020    Recurrent urinary tract infection     Seizures     Shingles 2007    SIADH (syndrome of inappropriate ADH production)     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    Tricuspid valve prolapse     Urolithiasis     Von Gierke disease     s/p liver transplant       Past Surgical History:   Procedure Laterality Date    APPENDECTOMY  6/22/2007    APPLICATION OF WOUND VACUUM-ASSISTED CLOSURE DEVICE N/A 9/18/2020    Procedure: APPLICATION, WOUND VAC;  Surgeon: Zain Decker MD;  Location: [facility] OR [address] [place] [address];  Service: General;  Laterality: N/A;    COLONOSCOPY  5/13/2008    internal hemorrhoids    CRANIOTOMY      ESOPHAGOGASTRODUODENOSCOPY N/A 9/3/2020    Procedure: EGD (ESOPHAGOGASTRODUODENOSCOPY);  Surgeon: Tyrel Vergara MD;  Location: [facility];  Service: Endoscopy;  Laterality: N/A;    ESOPHAGOGASTRODUODENOSCOPY N/A 12/22/2023    Procedure: EGD (ESOPHAGOGASTRODUODENOSCOPY);  Surgeon: Jhony James MD;  Location: [address]);  Service: Endoscopy;  Laterality: N/A;    EXPLORATORY LAPAROTOMY  2020    due to perforated stomach    LAMINECTOMY  3/2001    LIVER TRANSPLANT  9/23/2002    OSSICULAR RECONSTRUCTION  10/4/1995    RIGHT REPLACEMENT PROSTHESIS for cholesteatoma    THYMECTOMY  5/2/2007    TONSILLECTOMY, ADENOIDECTOMY  1/21/2004    TOTAL ABDOMINAL HYSTERECTOMY  3/31/1994       Review of patient's allergies indicates:   Allergen Reactions    Codeine Itching      Other reaction(s): Itching    Lipitor [atorvastatin] Other (See Comments)     Other reaction(s): Muscle pain  Muscle cranmps    Morphine Itching     Other reaction(s): nausea and vomiting     Zoloft [sertraline] Other (See Comments)     Tremors/muscle spasms       No current facility-administered medications on file prior to encounter.     Current Outpatient Medications on File Prior to Encounter   Medication Sig    clonazePAM (KLONOPIN) 0.5 MG tablet Take 0.5 tablets (0.25 mg total) by mouth 2 (two) times daily.    fluorometholone 0.1% (FML) 0.1 % DrpS Place 2 drops into the left eye 2 (two) times daily.    furosemide (LASIX) 40 MG tablet Take 0.5 tablets (20 mg total) by mouth once daily. Hold until you talk to your hepatologist.    lactulose (CHRONULAC) 10 gram/15 mL solution Take 15 mLs (10 g total) by mouth 3 (three) times daily.    levothyroxine (SYNTHROID) 75 MCG tablet Take 1 tablet (75 mcg total) by mouth once daily.    magnesium oxide (MAG-OX) 400 mg (241.3 mg magnesium) tablet TAKE 1 TABLET(400 MG) BY MOUTH TWICE DAILY    pantoprazole (PROTONIX) 40 MG tablet Take 40 mg by mouth once daily.    pravastatin (PRAVACHOL) 20 MG tablet Take 1 tablet (20 mg total) by mouth once daily. Need OFFICE VISIT before next refill, last seen 9/2023. This will be the LAST Rx if visit is not made.    prochlorperazine (COMPAZINE) 10 MG tablet Take 1 tablet (10 mg total) by mouth every 6 (six) hours as needed (migraine or nausea).    ramipriL (ALTACE) 5 MG capsule Take 1 capsule (5 mg total) by mouth once daily.    spironolactone (ALDACTONE) 50 MG tablet Take 1 tablet (50 mg total) by mouth once daily. Hold until you talk to your hepatologist.    tacrolimus (PROGRAF) 0.5 MG Cap Take 1 capsule (0.5 mg total) by mouth every 12 (twelve) hours.    venlafaxine (EFFEXOR-XR) 150 MG Cp24 Take 1 capsule (150 mg total) by mouth once daily.     Family History       Problem Relation (Age of Onset)    Heart disease Mother    No Known  Problems Father    Stroke Mother          Tobacco Use    Smoking status: Never    Smokeless tobacco: Never   Substance and Sexual Activity    Alcohol use: No    Drug use: No    Sexual activity: Not on file     Review of Systems   Reason unable to perform ROS: AMS.   Gastrointestinal:  Positive for abdominal pain, nausea and vomiting.   Neurological:  Positive for headaches.   Psychiatric/Behavioral:  Positive for confusion and decreased concentration.      Objective:     Vital Signs (Most Recent):  Temp: 98.4 °F (36.9 °C) (08/15/24 2330)  Pulse: 71 (08/15/24 2320)  Resp: 19 (08/15/24 2000)  BP: (!) 152/65 (08/15/24 2320)  SpO2: 100 % (08/15/24 2320) Vital Signs (24h Range):  Temp:  [98.4 °F (36.9 °C)-99.1 °F (37.3 °C)] 98.4 °F (36.9 °C)  Pulse:  [71-79] 71  Resp:  [17-20] 19  SpO2:  [97 %-100 %] 100 %  BP: (104-154)/(65-68) 152/65     Weight: 61.7 kg (136 lb)  Body mass index is 27.47 kg/m².     Physical Exam  Vitals and nursing note reviewed. Exam conducted with a chaperone present.   Constitutional:       General: She is not in acute distress.     Appearance: Normal appearance. She is ill-appearing, toxic-appearing and diaphoretic.      Comments: Appears Euvolemic   HENT:      Head: Normocephalic and atraumatic.   Eyes:      Extraocular Movements: Extraocular movements intact.   Cardiovascular:      Rate and Rhythm: Regular rhythm. Tachycardia present.      Pulses: Normal pulses.   Pulmonary:      Effort: Pulmonary effort is normal. No respiratory distress.      Breath sounds: Normal breath sounds. No wheezing or rales.   Abdominal:      General: There is no distension.      Palpations: Abdomen is soft. There is no mass.      Tenderness: There is abdominal tenderness. There is guarding (mild). There is no rebound.      Hernia: No hernia is present.   Musculoskeletal:         General: Normal range of motion.      Cervical back: Normal range of motion.      Right lower leg: No edema.      Left lower leg: No edema.    Skin:     General: Skin is warm.      Coloration: Skin is jaundiced (slightly).   Neurological:      General: No focal deficit present.      Mental Status: She is alert. She is disoriented.   Psychiatric:         Behavior: Behavior normal.         Thought Content: Thought content normal.         Judgment: Judgment normal.                Significant Labs: All pertinent labs within the past 24 hours have been reviewed.    Significant Imaging: I have reviewed all pertinent imaging results/findings within the past 24 hours.

## 2024-08-16 NOTE — CARE UPDATE
"RAPID RESPONSE NURSE CHART REVIEW        Chart Reviewed: 08/16/2024, 1:17 AM    MRN: [id number]  Bed: ED 34/34    Dx: <principal problem not specified>    Elda Hernandez has a past medical history of Angiolipoma of kidney, Arnold-Chiari malformation, Bacteremia, Depression, Esophageal stricture, Essential tremor, Hypertension, Left bundle branch block, Liver fibrosis, transplanted liver, Migraine without aura, MVP (mitral valve prolapse), Non-rheumatic mitral regurgitation, Non-rheumatic tricuspid valve insufficiency, Osteoporosis, Perforated abdominal viscus, Recurrent urinary tract infection, Seizures, Shingles, SIADH (syndrome of inappropriate ADH production), Squamous cell carcinoma, Tricuspid valve prolapse, Urolithiasis, and Von Gierke disease.    Last VS: /66   Pulse 79   Temp 99.1 °F (37.3 °C) (Oral)   Resp 19   Ht 4' 11" (1.499 m)   Wt 61.7 kg (136 lb)   SpO2 100%   BMI 27.47 kg/m²     24H Vital Sign Range:  Temp:  [99.1 °F (37.3 °C)]   Pulse:  [71-79]   Resp:  [17-20]   BP: (104-154)/(66-68)   SpO2:  [97 %-100 %]     Level of Consciousness (AVPU): alert    Recent Labs     08/15/24  1921   WBC 3.49*   HGB 8.2*   HCT 23.6*   PLT 54*       Recent Labs     08/15/24  1921 08/16/24  0021   * 126*   K 4.9 4.9   CL 99 102   CO2 18* 16*   BUN 38* 37*   CREATININE 2.2* 2.3*   GLU 82 98        No results for input(s): "PH", "PCO2", "PO2", "HCO3", "POCSATURATED", "BE" in the last 72 hours.     OXYGEN:             MEWS score: 1    Carlos Solomon DO contacted for IV access and or blood draw.  No additional concerns verbalized at this time. At time of assistance, PICC team at [facility]. Instructed to call [phone] for further concerns or assistance.    Lyla Pete RN       "

## 2024-08-16 NOTE — HPI
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     She is unable to provide a meaningful history because she is altered. No family at bedside. She reports that she had 1 episode of nausea and vomiting last night. Also c/o of abdominal pain and headache, the later of which she says is related to her low Na.     HDS, AF, Na 126, CO2 18, BUN 38, Cr 2.2, , Protein 5.8, Bili 3.9, Direct 2.8, AST 61, Alt 34, Ammonia 59, Serum Osm 282, Ua w/ trace LE, 1.010 speficic grav, U cr 55, U protein <7, U Na 12, U Osm 248. Head CT nothing acute, Liver US shows Mildly elevated intrahepatic arterial resistive indices, noting improvement from 06/17/2024, which can be seen in transplant rejection or chronic liver disease. Unremarkable RP US.     Admitted to medicine for hyponatremia, JACQUE in a transplant pt.     She was recently hospitalized about 2-3 weeks ago:   Labs done on 7/25/2024 showed acute on chronic hyponatremia (sodium 125 mmol/L,), hyperkalemia (potassium 5.8 mmol/L), acute kidney injury (BUN 44 mg/dL and creatinine 5.2 mg/dL, [baseline 1.0], elevated ammonia (200 umol/L).hyponatermia and JACQUE.  Due to her history of  liver transplant, her case was discussed with Hepatology at [facility]. Hepatology was consulted as was planned prior to transfer. She was put on normal saline at 100 mL/hr. Sodium improved to 129 mmol/L by 7/29/2024 and acute kidney injury improved. Hepatology recommended stopping IV fluids and resuming diuretics at the previous doses before they were increased. IV fluids were stopped. BUN and creatinine continued to improve but sodium decreased a little to 127. Interventional Radiology was unable to find a pocket of fluid for therapeutic paracentesis, a problem she had in the past. She was discharged home. Sodium was 129 at discharge. She was advised to hold furosemide and spironolactone until she gets outpatient labs and follows up with her hepatologist.

## 2024-08-16 NOTE — H&P
[facility] - Emergency Dept  Hospital Medicine  History & Physical    Patient Name: Elda Hernandez  MRN: [id number]  Patient Class: OP- Observation  Admission Date: 8/15/2024  Attending Physician: Tyrel Mcdonald, *   Primary Care Provider: David Lorenzo MD         Patient information was obtained from patient and ER records.     Subjective:     Principal Problem:<principal problem not specified>    Chief Complaint:   Chief Complaint   Patient presents with    Abnormal labs     Na 126 with last set of labs. Liver transplant pt. Confusion. Jaundice.         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     She is unable to provide a meaningful history because she is altered. No family at bedside. She reports that she had 1 episode of nausea and vomiting last night. Also c/o of abdominal pain and headache, the later of which she says is related to her low Na.     HDS, AF, Na 126, CO2 18, BUN 38, Cr 2.2, , Protein 5.8, Bili 3.9, Direct 2.8, AST 61, Alt 34, Ammonia 59, Serum Osm 282, Ua w/ trace LE, 1.010 speficic grav, U cr 55, U protein <7, U Na 12, U Osm 248. Head CT nothing acute, Liver US shows  Mildly elevated intrahepatic arterial resistive indices, noting improvement from 06/17/2024, which can be seen in transplant rejection or chronic liver disease. Unremarkable RP US.     Admitted to medicine for hypoNa, JACQUE in a transplant pt.     She was recently hospitalized about 2-3 weeks ago:   Labs done on 7/25/2024 showed acute on chronic hyponatremia (sodium 125 mmol/L,), hyperkalemia (potassium 5.8 mmol/L), acute kidney injury (BUN 44 mg/dL and creatinine 5.2 mg/dL, [baseline 1.0], elevated ammonia (200 umol/L).hyponatermia and JACQUE.  Due to her history of liver transplant, her case was discussed with Hepatology at [facility]. Hepatology was consulted as was planned prior to transfer. She was put on normal saline at 100 mL/hr. Sodium improved to 129 mmol/L by 7/29/2024 and acute kidney injury improved. Hepatology recommended stopping IV fluids and resuming diuretics at the previous doses before they were increased. IV fluids were stopped. BUN and creatinine continued to improve but sodium decreased a little to 127. Interventional Radiology was unable to find a pocket of fluid for therapeutic paracentesis, a problem she had in the past. She was discharged home. Sodium was 129 at discharge. She was advised to hold furosemide and spironolactone until she gets outpatient labs and follows up with her hepatologist.     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    Arnold-Chiari malformation     Bacteremia 12/22/2023    Depression     Esophageal stricture     Essential tremor     Hypertension     Left bundle branch block     Liver fibrosis, transplanted liver 10/02/2018    Suggested on fibroscan 10/2/18    Migraine without aura     MVP (mitral valve prolapse)     Non-rheumatic mitral regurgitation 10/01/2018    Non-rheumatic tricuspid valve insufficiency 10/01/2018    Osteoporosis     Perforated abdominal viscus 09/04/2020    Recurrent urinary tract infection      Seizures     Shingles 2007    SIADH (syndrome of inappropriate ADH production)     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    Tricuspid valve prolapse     Urolithiasis     Von Gierke disease     s/p liver transplant       Past Surgical History:   Procedure Laterality Date    APPENDECTOMY  6/22/2007    APPLICATION OF WOUND VACUUM-ASSISTED CLOSURE DEVICE N/A 9/18/2020    Procedure: APPLICATION, WOUND VAC;  Surgeon: Zain Decker MD;  Location: [facility] OR [facility];  Service: General;  Laterality: N/A;    COLONOSCOPY  5/13/2008    internal hemorrhoids    CRANIOTOMY      ESOPHAGOGASTRODUODENOSCOPY N/A 9/3/2020    Procedure: EGD (ESOPHAGOGASTRODUODENOSCOPY);  Surgeon: Tyrel Vergara MD;  Location: [facility];  Service: Endoscopy;  Laterality: N/A;    ESOPHAGOGASTRODUODENOSCOPY N/A 12/22/2023    Procedure: EGD (ESOPHAGOGASTRODUODENOSCOPY);  Surgeon: Jhony James MD;  Location: [facility] (2ND FLR);  Service: Endoscopy;  Laterality: N/A;    EXPLORATORY LAPAROTOMY  2020    due to perforated stomach    LAMINECTOMY  3/2001    LIVER TRANSPLANT  9/23/2002    OSSICULAR RECONSTRUCTION  10/4/1995    RIGHT REPLACEMENT PROSTHESIS for cholesteatoma    THYMECTOMY  5/2/2007    TONSILLECTOMY, ADENOIDECTOMY  1/21/2004    TOTAL ABDOMINAL HYSTERECTOMY  3/31/1994       Review of patient's allergies indicates:   Allergen Reactions    Codeine Itching     Other reaction(s): Itching    Lipitor [atorvastatin] Other (See Comments)     Other reaction(s): Muscle pain  Muscle cranmps    Morphine Itching     Other reaction(s): nausea and vomiting     Zoloft [sertraline] Other (See Comments)     Tremors/muscle spasms       No current facility-administered medications on file prior to encounter.     Current Outpatient Medications on File Prior to Encounter   Medication Sig    clonazePAM (KLONOPIN) 0.5 MG tablet Take 0.5 tablets (0.25 mg total) by mouth 2 (two) times daily.    fluorometholone 0.1% (FML) 0.1 % DrpS Place 2 drops into the  left eye 2 (two) times daily.    furosemide (LASIX) 40 MG tablet Take 0.5 tablets (20 mg total) by mouth once daily. Hold until you talk to your hepatologist.    lactulose (CHRONULAC) 10 gram/15 mL solution Take 15 mLs (10 g total) by mouth 3 (three) times daily.    levothyroxine (SYNTHROID) 75 MCG tablet Take 1 tablet (75 mcg total) by mouth once daily.    magnesium oxide (MAG-OX) 400 mg (241.3 mg magnesium) tablet TAKE 1 TABLET(400 MG) BY MOUTH TWICE DAILY    pantoprazole (PROTONIX) 40 MG tablet Take 40 mg by mouth once daily.    pravastatin (PRAVACHOL) 20 MG tablet Take 1 tablet (20 mg total) by mouth once daily. Need OFFICE VISIT before next refill, last seen 9/2023. This will be the LAST Rx if visit is not made.    prochlorperazine (COMPAZINE) 10 MG tablet Take 1 tablet (10 mg total) by mouth every 6 (six) hours as needed (migraine or nausea).    ramipriL (ALTACE) 5 MG capsule Take 1 capsule (5 mg total) by mouth once daily.    spironolactone (ALDACTONE) 50 MG tablet Take 1 tablet (50 mg total) by mouth once daily. Hold until you talk to your hepatologist.    tacrolimus (PROGRAF) 0.5 MG Cap Take 1 capsule (0.5 mg total) by mouth every 12 (twelve) hours.    venlafaxine (EFFEXOR-XR) 150 MG Cp24 Take 1 capsule (150 mg total) by mouth once daily.     Family History       Problem Relation (Age of Onset)    Heart disease Mother    No Known Problems Father    Stroke Mother          Tobacco Use    Smoking status: Never    Smokeless tobacco: Never   Substance and Sexual Activity    Alcohol use: No    Drug use: No    Sexual activity: Not on file     Review of Systems   Reason unable to perform ROS: AMS.   Gastrointestinal:  Positive for abdominal pain, nausea and vomiting.   Neurological:  Positive for headaches.   Psychiatric/Behavioral:  Positive for confusion and decreased concentration.      Objective:     Vital Signs (Most Recent):  Temp: 98.4 °F (36.9 °C) (08/15/24 2330)  Pulse: 71 (08/15/24 2320)  Resp: 19  (08/15/24 2000)  BP: (!) 152/65 (08/15/24 2320)  SpO2: 100 % (08/15/24 2320) Vital Signs (24h Range):  Temp:  [98.4 °F (36.9 °C)-99.1 °F (37.3 °C)] 98.4 °F (36.9 °C)  Pulse:  [71-79] 71  Resp:  [17-20] 19  SpO2:  [97 %-100 %] 100 %  BP: (104-154)/(65-68) 152/65     Weight: 61.7 kg (136 lb)  Body mass index is 27.47 kg/m².     Physical Exam  Vitals and nursing note reviewed. Exam conducted with a chaperone present.   Constitutional:       General: She is not in acute distress.     Appearance: Normal appearance. She is ill-appearing, toxic-appearing and diaphoretic.      Comments: Appears Euvolemic   HENT:      Head: Normocephalic and atraumatic.   Eyes:      Extraocular Movements: Extraocular movements intact.   Cardiovascular:      Rate and Rhythm: Regular rhythm. Tachycardia present.      Pulses: Normal pulses.   Pulmonary:      Effort: Pulmonary effort is normal. No respiratory distress.      Breath sounds: Normal breath sounds. No wheezing or rales.   Abdominal:      General: There is no distension.      Palpations: Abdomen is soft. There is no mass.      Tenderness: There is abdominal tenderness. There is guarding (mild). There is no rebound.      Hernia: No hernia is present.   Musculoskeletal:         General: Normal range of motion.      Cervical back: Normal range of motion.      Right lower leg: No edema.      Left lower leg: No edema.   Skin:     General: Skin is warm.      Coloration: Skin is jaundiced (slightly).   Neurological:      General: No focal deficit present.      Mental Status: She is alert. She is disoriented.   Psychiatric:         Behavior: Behavior normal.         Thought Content: Thought content normal.         Judgment: Judgment normal.                Significant Labs: All pertinent labs within the past 24 hours have been reviewed.    Significant Imaging: I have reviewed all pertinent imaging results/findings within the past 24 hours.  Assessment/Plan:     AMS (altered mental status)  AMS  + headache in setting of abdominal pain concerning for SBP, Unknown ammonia level w/ last BM yesterday, and hyponatremia.     Plan  CT Head to evaluate for edema- Neg  Q4 BMP   ABX for possible infection (?SBP)  Ammonia 59, Lactulose TID +Rifax; downtitrate to 3 BMs      Pancytopenia  Pancytopenic in the setting of cirrhosis and immuno surpression.     Hyponatremia  Hyponatremia is likely due to unclear etiology. The patient's most recent sodium results are listed below.  Recent Labs     08/15/24  1921   *     Hx of cirrhosis, SIADH, JACQUE now resolved.   Transplant rec fluid restrict and hold diuertics (on ), given 400ml IVMF in ED. 0000 Repeat flat @ 126.     Plan  Appears Euvolemic on exam.   Normotonic Serum Osm  Urine studies: U Cr 55, U Protein <7, Na 12, Osm 248  Nephro consult, discussed w/ on call fellow, will give 1g/kg Albumin for possible type 2 HRS.       JACQUE (acute kidney injury)  Elevated sCr, down from recent hospitalization 2 weeks ago, up from baseline of 1. Urine low sodium, normal osm,   Concerned of HRS syndrome, Nephro consulted & discussed w/ on call fellow  1g/kg Albumin for possible type 2 HRS.   Normotensive, will hold off on Midodrine and Octreotide and Terlipressin for now.   Euvolemic, will hold off on fluids      LBBB (left bundle branch block)  Tele      Long-term use of immunosuppressant medication  On Immunosurpression for liver transplant. Possible SBP. Tacro level pending.   On Tacro BID.     Chronic rejection of liver transplant  MELD 3.0: 34 at 8/15/2024  7:21 PM  MELD-Na: 31 at 8/15/2024  7:21 PM  Calculated from:  Serum Creatinine: 2.2 mg/dL at 8/15/2024  7:21 PM  Serum Sodium: 126 mmol/L at 8/15/2024  7:21 PM  Total Bilirubin: 3.9 mg/dL at 8/15/2024  7:21 PM  Serum Albumin: 2.5 g/dL at 8/15/2024  7:21 PM  INR(ratio): 1.9 at 8/15/2024  7:21 PM  Age at listin years  Sex: Female at 8/15/2024  7:21 PM    Plan  Daily liver labs (CMP, Coags)  Ammonia 59, Lactulose TID,  and Rifaximin   Hep consulted  Continue Immuno suppression.   ED did pocus, unable to tap. Will consider SBP labs with IR in AM.       Iron deficiency anemia secondary to inadequate dietary iron intake  Hx of.  Will reevlaute with Iron studies and consider Iron supplementation if indicated    Abdominal pain  Concerned of SBP given AMS, Abdominal pain, tenderness/mild guarding to palpation.   ED POCUS unable to show safe pocket for Para; IR consulted  Will give Ceftriaxone to cover for possible SBP  Blood cultures ordered      Hypothyroidism  Continue home medications.       Anxiety  Hold in setting of hyponatermia and AMS      SIADH (syndrome of inappropriate ADH production)  Noted on chart review. Likely not contributing to current picture as Urine Na low, and Serum Osm wnl and urine Osm wnl      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  Noted        VTE Risk Mitigation (From admission, onward)           Ordered     IP VTE LOW RISK PATIENT  Once         08/15/24 2148     Place sequential compression device  Until discontinued         08/15/24 2148                                Carlos Solomon DO  Department of Hospital Medicine  [facility] - Emergency Dept

## 2024-08-16 NOTE — ASSESSMENT & PLAN NOTE
Noted on chart review. Likely not contributing to current picture as Urine Na low, and Serum Osm wnl and urine Osm wnl

## 2024-08-16 NOTE — HPI
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  She reports that she had 1 episode of nausea and vomiting last night. Also c/o of abdominal pain and headache, the later of which she says is related to her low Na.      In ED, HDS, AF, Na 126, CO2 18, BUN 38, Cr 2.2, , Protein 5.8, Bili 3.9, Direct 2.8, AST 61, Alt 34, Ammonia 59, Serum Osm 282, Ua w/ trace LE, 1.010 speficic grav, U cr 55, U protein <7, U Na 12, U Osm 248. Head CT nothing acute, Liver US shows Mildly elevated intrahepatic arterial resistive indices, noting improvement from 06/17/2024, which can be seen in transplant rejection or chronic liver disease. Unremarkable RP US.     Nephrology consulted for concern for JACQUE and hyponatremia

## 2024-08-17 PROBLEM — N18.9 ACUTE KIDNEY INJURY SUPERIMPOSED ON CKD: Status: ACTIVE | Noted: 2020-09-05

## 2024-08-17 PROBLEM — G93.41 ACUTE METABOLIC ENCEPHALOPATHY: Status: ACTIVE | Noted: 2024-08-15

## 2024-08-17 LAB
ABO + RH BLD: NORMAL
ALBUMIN SERPL BCP-MCNC: 3 G/DL (ref 3.5–5.2)
ALP SERPL-CCNC: 301 U/L (ref 55–135)
ALT SERPL W/O P-5'-P-CCNC: 24 U/L (ref 10–44)
ANION GAP SERPL CALC-SCNC: 10 MMOL/L (ref 8–16)
ANION GAP SERPL CALC-SCNC: 10 MMOL/L (ref 8–16)
APTT PPP: 36.8 SEC (ref 21–32)
AST SERPL-CCNC: 43 U/L (ref 10–40)
BASOPHILS # BLD AUTO: 0.02 K/UL (ref 0–0.2)
BASOPHILS NFR BLD: 0.8 % (ref 0–1.9)
BILIRUB SERPL-MCNC: 3.6 MG/DL (ref 0.1–1)
BLD GP AB SCN CELLS X3 SERPL QL: NORMAL
BLD PROD TYP BPU: NORMAL
BLOOD UNIT EXPIRATION DATE: NORMAL
BLOOD UNIT TYPE CODE: 7300
BLOOD UNIT TYPE: NORMAL
BUN SERPL-MCNC: 23 MG/DL (ref 6–20)
BUN SERPL-MCNC: 25 MG/DL (ref 6–20)
CALCIUM SERPL-MCNC: 8.8 MG/DL (ref 8.7–10.5)
CALCIUM SERPL-MCNC: 8.8 MG/DL (ref 8.7–10.5)
CHLORIDE SERPL-SCNC: 109 MMOL/L (ref 95–110)
CHLORIDE SERPL-SCNC: 110 MMOL/L (ref 95–110)
CO2 SERPL-SCNC: 18 MMOL/L (ref 23–29)
CO2 SERPL-SCNC: 18 MMOL/L (ref 23–29)
CODING SYSTEM: NORMAL
CREAT SERPL-MCNC: 1.7 MG/DL (ref 0.5–1.4)
CREAT SERPL-MCNC: 1.7 MG/DL (ref 0.5–1.4)
CROSSMATCH INTERPRETATION: NORMAL
DIFFERENTIAL METHOD BLD: ABNORMAL
DISPENSE STATUS: NORMAL
EOSINOPHIL # BLD AUTO: 0.1 K/UL (ref 0–0.5)
EOSINOPHIL NFR BLD: 1.9 % (ref 0–8)
ERYTHROCYTE [DISTWIDTH] IN BLOOD BY AUTOMATED COUNT: 15.5 % (ref 11.5–14.5)
EST. GFR  (NO RACE VARIABLE): 35 ML/MIN/1.73 M^2
EST. GFR  (NO RACE VARIABLE): 35 ML/MIN/1.73 M^2
GLUCOSE SERPL-MCNC: 87 MG/DL (ref 70–110)
GLUCOSE SERPL-MCNC: 95 MG/DL (ref 70–110)
HCT VFR BLD AUTO: 20.7 % (ref 37–48.5)
HGB BLD-MCNC: 7 G/DL (ref 12–16)
IMM GRANULOCYTES # BLD AUTO: 0 K/UL (ref 0–0.04)
IMM GRANULOCYTES NFR BLD AUTO: 0 % (ref 0–0.5)
INR PPP: 1.5 (ref 0.8–1.2)
LYMPHOCYTES # BLD AUTO: 0.4 K/UL (ref 1–4.8)
LYMPHOCYTES NFR BLD: 15.4 % (ref 18–48)
MAGNESIUM SERPL-MCNC: 1.9 MG/DL (ref 1.6–2.6)
MCH RBC QN AUTO: 29.7 PG (ref 27–31)
MCHC RBC AUTO-ENTMCNC: 33.8 G/DL (ref 32–36)
MCV RBC AUTO: 88 FL (ref 82–98)
MONOCYTES # BLD AUTO: 0.4 K/UL (ref 0.3–1)
MONOCYTES NFR BLD: 16.2 % (ref 4–15)
NEUTROPHILS # BLD AUTO: 1.7 K/UL (ref 1.8–7.7)
NEUTROPHILS NFR BLD: 65.7 % (ref 38–73)
NRBC BLD-RTO: 0 /100 WBC
NUM UNITS TRANS PACKED RBC: NORMAL
PHOSPHATE SERPL-MCNC: 2.8 MG/DL (ref 2.7–4.5)
PLATELET # BLD AUTO: 44 K/UL (ref 150–450)
PMV BLD AUTO: 12.1 FL (ref 9.2–12.9)
POTASSIUM SERPL-SCNC: 3.4 MMOL/L (ref 3.5–5.1)
POTASSIUM SERPL-SCNC: 3.4 MMOL/L (ref 3.5–5.1)
PROT SERPL-MCNC: 5.4 G/DL (ref 6–8.4)
PROTHROMBIN TIME: 15.6 SEC (ref 9–12.5)
RBC # BLD AUTO: 2.36 M/UL (ref 4–5.4)
SODIUM SERPL-SCNC: 137 MMOL/L (ref 136–145)
SODIUM SERPL-SCNC: 138 MMOL/L (ref 136–145)
SPECIMEN OUTDATE: NORMAL
TACROLIMUS BLD-MCNC: 7 NG/ML (ref 5–15)
WBC # BLD AUTO: 2.6 K/UL (ref 3.9–12.7)

## 2024-08-17 PROCEDURE — 80053 COMPREHEN METABOLIC PANEL: CPT

## 2024-08-17 PROCEDURE — 83735 ASSAY OF MAGNESIUM: CPT

## 2024-08-17 PROCEDURE — 21400001 HC TELEMETRY ROOM

## 2024-08-17 PROCEDURE — 86920 COMPATIBILITY TEST SPIN: CPT | Performed by: INTERNAL MEDICINE

## 2024-08-17 PROCEDURE — 80048 BASIC METABOLIC PNL TOTAL CA: CPT

## 2024-08-17 PROCEDURE — A4216 STERILE WATER/SALINE, 10 ML: HCPCS

## 2024-08-17 PROCEDURE — 99231 SBSQ HOSP IP/OBS SF/LOW 25: CPT | Mod: GC,,, | Performed by: INTERNAL MEDICINE

## 2024-08-17 PROCEDURE — 36415 COLL VENOUS BLD VENIPUNCTURE: CPT

## 2024-08-17 PROCEDURE — P9016 RBC LEUKOCYTES REDUCED: HCPCS | Performed by: INTERNAL MEDICINE

## 2024-08-17 PROCEDURE — 86901 BLOOD TYPING SEROLOGIC RH(D): CPT

## 2024-08-17 PROCEDURE — 25000003 PHARM REV CODE 250

## 2024-08-17 PROCEDURE — 85025 COMPLETE CBC W/AUTO DIFF WBC: CPT

## 2024-08-17 PROCEDURE — P9047 ALBUMIN (HUMAN), 25%, 50ML: HCPCS | Mod: JG

## 2024-08-17 PROCEDURE — 86900 BLOOD TYPING SEROLOGIC ABO: CPT

## 2024-08-17 PROCEDURE — 36430 TRANSFUSION BLD/BLD COMPNT: CPT

## 2024-08-17 PROCEDURE — 63600175 PHARM REV CODE 636 W HCPCS: Mod: JG

## 2024-08-17 PROCEDURE — 25000003 PHARM REV CODE 250: Performed by: INTERNAL MEDICINE

## 2024-08-17 PROCEDURE — 63600175 PHARM REV CODE 636 W HCPCS: Performed by: INTERNAL MEDICINE

## 2024-08-17 PROCEDURE — 86850 RBC ANTIBODY SCREEN: CPT

## 2024-08-17 PROCEDURE — 84100 ASSAY OF PHOSPHORUS: CPT

## 2024-08-17 PROCEDURE — 85730 THROMBOPLASTIN TIME PARTIAL: CPT

## 2024-08-17 PROCEDURE — 25000003 PHARM REV CODE 250: Performed by: STUDENT IN AN ORGANIZED HEALTH CARE EDUCATION/TRAINING PROGRAM

## 2024-08-17 PROCEDURE — 85610 PROTHROMBIN TIME: CPT

## 2024-08-17 PROCEDURE — 80197 ASSAY OF TACROLIMUS: CPT | Performed by: INTERNAL MEDICINE

## 2024-08-17 RX ORDER — ONDANSETRON HYDROCHLORIDE 2 MG/ML
4 INJECTION, SOLUTION INTRAVENOUS EVERY 6 HOURS PRN
Status: DISCONTINUED | OUTPATIENT
Start: 2024-08-17 | End: 2024-08-19 | Stop reason: HOSPADM

## 2024-08-17 RX ORDER — POTASSIUM CHLORIDE 20 MEQ/1
40 TABLET, EXTENDED RELEASE ORAL ONCE
Status: COMPLETED | OUTPATIENT
Start: 2024-08-17 | End: 2024-08-17

## 2024-08-17 RX ORDER — HYDROCODONE BITARTRATE AND ACETAMINOPHEN 500; 5 MG/1; MG/1
TABLET ORAL
Status: DISCONTINUED | OUTPATIENT
Start: 2024-08-17 | End: 2024-08-19 | Stop reason: HOSPADM

## 2024-08-17 RX ORDER — OXYCODONE HYDROCHLORIDE 5 MG/1
5 TABLET ORAL EVERY 6 HOURS PRN
Status: DISCONTINUED | OUTPATIENT
Start: 2024-08-17 | End: 2024-08-19 | Stop reason: HOSPADM

## 2024-08-17 RX ADMIN — Medication 10 ML: at 12:08

## 2024-08-17 RX ADMIN — TACROLIMUS 0.5 MG: 0.5 CAPSULE ORAL at 08:08

## 2024-08-17 RX ADMIN — Medication 10 ML: at 06:08

## 2024-08-17 RX ADMIN — RIFAXIMIN 550 MG: 550 TABLET ORAL at 09:08

## 2024-08-17 RX ADMIN — PANTOPRAZOLE SODIUM 40 MG: 40 TABLET, DELAYED RELEASE ORAL at 08:08

## 2024-08-17 RX ADMIN — TACROLIMUS 0.5 MG: 0.5 CAPSULE ORAL at 06:08

## 2024-08-17 RX ADMIN — OXYCODONE HYDROCHLORIDE 5 MG: 5 TABLET ORAL at 06:08

## 2024-08-17 RX ADMIN — ALBUMIN (HUMAN) 60 G: 12.5 SOLUTION INTRAVENOUS at 08:08

## 2024-08-17 RX ADMIN — LACTULOSE 30 G: 20 SOLUTION ORAL at 03:08

## 2024-08-17 RX ADMIN — POTASSIUM CHLORIDE 40 MEQ: 1500 TABLET, EXTENDED RELEASE ORAL at 03:08

## 2024-08-17 RX ADMIN — LACTULOSE 30 G: 20 SOLUTION ORAL at 08:08

## 2024-08-17 RX ADMIN — ONDANSETRON 4 MG: 2 INJECTION INTRAMUSCULAR; INTRAVENOUS at 03:08

## 2024-08-17 RX ADMIN — LEVOTHYROXINE SODIUM 75 MCG: 75 TABLET ORAL at 08:08

## 2024-08-17 RX ADMIN — CEFTRIAXONE 2 G: 2 INJECTION, POWDER, FOR SOLUTION INTRAMUSCULAR; INTRAVENOUS at 12:08

## 2024-08-17 RX ADMIN — Medication 6 MG: at 09:08

## 2024-08-17 RX ADMIN — ONDANSETRON 4 MG: 2 INJECTION INTRAMUSCULAR; INTRAVENOUS at 09:08

## 2024-08-17 RX ADMIN — VENLAFAXINE HYDROCHLORIDE 150 MG: 75 CAPSULE, EXTENDED RELEASE ORAL at 08:08

## 2024-08-17 RX ADMIN — Medication 10 ML: at 02:08

## 2024-08-17 RX ADMIN — MUSCLE RUB CREAM: 100; 150 CREAM TOPICAL at 04:08

## 2024-08-17 RX ADMIN — RIFAXIMIN 550 MG: 550 TABLET ORAL at 08:08

## 2024-08-17 NOTE — ED NOTES
Telemetry Verification   Patient placed on Telemetry Box  Verified with War Room  Tech    Box #    Rate [id number]   Rhythm NSR

## 2024-08-17 NOTE — ASSESSMENT & PLAN NOTE
Anemia is likely due to chronic blood loss resulting in iron deficiency anemia and chronic disease due to Chronic liver disease and CKD. Hgb 8.2 on admit and close to baseline level but down to 7 on 8/17. Likely combination of her chronic anemia and hemodilution as has received IVF's and IV Albumin in hospital for hyponatremia and JACQUE. No clinical signs of active bleeding. Most recent hemoglobin and hematocrit are listed below.  Recent Labs     08/15/24  1921 08/16/24  0444 08/17/24  0440   HGB 8.2* 7.4* 7.0*   HCT 23.6* 22.4* 20.7*     Plan  - Monitor serial CBC: Daily  - Transfuse PRBC if patient becomes hemodynamically unstable, symptomatic or H/H drops below 7/21.  - Patient has not received any PRBC transfusions to date  - Patient's anemia is currently worsening. Will adjust treatment as follows: Plan to transfuse 1 unit of PRBCs on 8/17 to treat her worsening anemia.  - Monitor for any clinical signs of bleeding.

## 2024-08-17 NOTE — ASSESSMENT & PLAN NOTE
Patient's most recent potassium results are listed below.   Recent Labs     08/16/24  0444 08/17/24  0440 08/17/24  0933   K 4.3  4.3 3.4* 3.4*     Plan  - Replete potassium per protocol  - Monitor potassium Daily  - Patient's hypokalemia is worsening. Will adjust treatment as follows: Will replace with oral potassium chloride 40 meq po x 1 dose on 8/17.

## 2024-08-17 NOTE — PROGRESS NOTES
Hepatology Progress Note    Elda Hernandez is a 54 y.o. female admitted to hospital 8/15/2024 (Hospital Day: 3) due to Hyponatremia.     Interval History  S/p paracentesis with 1.1L removed yesterday. Patient refusing lactulose due to abdominal cramping.     Objective  Temp:  [97.3 °F (36.3 °C)-98.2 °F (36.8 °C)] 98 °F (36.7 °C) (754)  Pulse:  [70-86] 76 (754)  BP: (128-164)/(60-87) 128/60 (754)  Resp:  [16-20] 16 (754)  SpO2:  [97 %-100 %] 98 % (754)    General: Alert, Oriented x3, no distress  Neurologic: Asterixis absent  Abdomen: distended. Soft. TTP. No peritoneal signs.    Laboratory    Lab Results   Component Value Date    WBC 2.60 (L) 2024    HGB 7.0 (L) 2024    HCT 20.7 (L) 2024    MCV 88 2024    PLT 44 (L) 2024       Lab Results   Component Value Date     2024    K 3.4 (L) 2024     2024    CO2 18 (L) 2024    BUN 23 (H) 2024    CREATININE 1.7 (H) 2024    CALCIUM 8.8 2024       Lab Results   Component Value Date    ALBUMIN 3.0 (L) 2024    ALT 24 2024    AST 43 (H) 2024     (H) 2018    ALKPHOS 301 (H) 2024    BILITOT 3.6 (H) 2024       Lab Results   Component Value Date    INR 1.5 (H) 2024    INR 1.3 (H) 2024    INR 1.9 (H) 08/15/2024       MELD 3.0: 23 at 2024  9:33 AM  MELD-Na: 21 at 2024  9:33 AM  Calculated from:  Serum Creatinine: 1.7 mg/dL at 2024  9:33 AM  Serum Sodium: 138 mmol/L (Using max of 137 mmol/L) at 2024  9:33 AM  Total Bilirubin: 3.6 mg/dL at 2024  4:40 AM  Serum Albumin: 3.0 g/dL at 2024  4:40 AM  INR(ratio): 1.5 at 2024  4:40 AM  Age at listin years  Sex: Female at 2024  9:33 AM      Assessment  Elda Hernandez is a 54 y.o. female with history of 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 in  (on chronic immunosuppression with tacrolimus), cirrhosis of  transplant liver with portal hypertension, trace ascites, hepatic encephalopathy, admitted for hyponatremia, confusion. Imaging and labs suggestive of rejection and cirrhosis of the transplanted liver, ruled out SBP.     Has had complicated history and is not a candidate for repeat transplant due to multiple co morbid conditions and repeated abdominal surgeries.     Plan  - Recommend continued supportive care of HE with lactulose and rifaximin   - Given her declining function and repeated admissions, recommend palliative care be engaged as she is not a transplant candidate and prognosis is poor   -Rest per primary       Thank you for involving us in the care of Elda Hernandez. Please call with any additional concerns or questions.    Chuyita Bowser MD  Gastroenterology & Hepatology Fellow PGY-IV  [facility]

## 2024-08-17 NOTE — PROGRESS NOTES
Advance Directives:   Living Will: No        Oral Declaration: No    [place]: No    Do Not Resuscitate Status: No    Medical Power of : No        Oral Declaration: No      Decision Making:  Patient answered questions  Goals of Care: The patient endorses that what is most important right now is to focus on remaining as independent as possible    Accordingly, we have decided that the best plan to meet the patient's goals includes continuing with treatment

## 2024-08-17 NOTE — ASSESSMENT & PLAN NOTE
Patient on chronic Immunosurpression for liver transplant. Hepatology managing immunosuppression in hospital. Continue home Tacrolimus po BID and monitor daily levels in the hospital.

## 2024-08-17 NOTE — ASSESSMENT & PLAN NOTE
Chronic condition. Patient with known pancytopenia and likely combination of liver cirrhosis and chronic immunosuppression.   Patient with known anemia, leukopenia and thrombocytopenia and blood counts at baseline.  Monitor daily CBC to watch her blood counts.

## 2024-08-17 NOTE — HOSPITAL COURSE
Patient admitted with symptomatic hyponatremia. Nephrology consulted and urine studies done and concern for hypovolemic hyponatremia so given IV Albumin infusion and 1 litre of normal saline. Sodium improved from 126 on admit to 130 on 8/16 to back to normal at 138 on 8/17 consistent with hypovolemic hyponatremia. Creatinien level also elevated at above baseline at 2.2 on admit and now back down to 1.7 on 8/17 after IV Albumin and normal saline and thus also consistent with hypovolemia causing JACQUE. Patient note dot have ascites on admit related to her cirrhosis of liver and IR consulted and 1.1 liters of ascitic fluid removed by IR on 8/16 without complications. Ascitic fluid only had 71 WBC so not consistent with SBP. SAAG 1.9 consistent with ascites related to portal HTN and consistent with her history of cirrhosis in transplanted liver. Hepatology consulted and also following for her liver transplant and cirrhosis and continuing with home immunosuppression in the hospital for her liver transplant. Hgb down to 7.0 on 8/17 after hydration and transfused 1 unit of PRBCs. Hgb improved to 7.3 on 8/18. Patient states she is feeling much better and still having some abdominal pain but improved from discharge. Hepatology recommending palliative care consult as patient not a candidate for future liver transplant and has had multiple recent admission to have goals of care discussion with patient I did speak with patient about what Hepatology stated and she was surprised as unaware she is not a liver transplant candidate for the future. Patient states she is not ready to make any life changing decisions at this time but I told her she did not have to make any decisions immediately but would be good to sit and talk to palliative care to see what her future options are and what goals of care she would want to make in the future as her liver disease is to likely progress and lead to further complications and likely  hospitalizations and have impact on her quality of life. Creatinine stable at 1.7 on 8/18. Palliative care did met with patient and discussed goals of care with patient. As noted patient nt ready to make any end of life care decisions at this time but discussion started. Creatinine stable at 1.6 on dy of discharge. Patient discharged home in good condition on 8/19. Hyponatremia resolved on discharge.

## 2024-08-17 NOTE — ASSESSMENT & PLAN NOTE
Patient with known liver transplant in 9/2002. Hepatology consulted to assist in immunosuppressive medicine management and plan to continue home Tacrolimus to treat.  Monitor daily Tacrolimus levels in hospital.   Appreciate Hepatology assistance on case.

## 2024-08-17 NOTE — PROGRESS NOTES
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 (on chronic immunosuppression with tacrolimus), cirrhosis of transplant liver with portal hypertension, trace ascites, hepatic encephalopathy, Arnold-Chiari malformation, migraines, history of craniotomy, 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     She is unable to provide a meaningful history because she is altered. No family at bedside. She reports that she had 1 episode of nausea and vomiting last night. Also c/o of abdominal pain and headache, the later of which she says is related to her low Na.     HDS, AF, Na 126, CO2 18, BUN 38, Cr 2.2, , Protein 5.8, Bili 3.9, Direct 2.8, AST 61, Alt 34, Ammonia 59, Serum Osm 282, Ua w/ trace LE, 1.010 speficic grav, U cr 55, U protein <7, U Na 12, U Osm 248. Head CT nothing acute, Liver US shows Mildly elevated intrahepatic arterial resistive indices, noting improvement from 06/17/2024, which can be seen in transplant rejection or chronic liver disease. Unremarkable RP US.     Admitted to medicine for hypoNa, JACQUE in a  transplant pt.     She was recently hospitalized about 2-3 weeks ago:   Labs done on 7/25/2024 showed acute on chronic hyponatremia (sodium 125 mmol/L,), hyperkalemia (potassium 5.8 mmol/L), acute kidney injury (BUN 44 mg/dL and creatinine 5.2 mg/dL, [baseline 1.0], elevated ammonia (200 umol/L).hyponatermia and JACQUE.  Due to her history of liver transplant, her case was discussed with Hepatology at [facility]. Hepatology was consulted as was planned prior to transfer. She was put on normal saline at 100 mL/hr. Sodium improved to 129 mmol/L by 7/29/2024 and acute kidney injury improved. Hepatology recommended stopping IV fluids and resuming diuretics at the previous doses before they were increased. IV fluids were stopped. BUN and creatinine continued to improve but sodium decreased a little to 127. Interventional Radiology was unable to find a pocket of fluid for therapeutic paracentesis, a problem she had in the past. She was discharged home. Sodium was 129 at discharge. She was advised to hold furosemide and spironolactone until she gets outpatient labs and follows up with her hepatologist.     Overview/Hospital Course:  Patient admitted with symptomatic hyponatremia. Nephrology consulted and urine studies done and concern for hypovolemic hyponatremia so given IV Albumin infusion and 1 litre of normal saline. Sodium improved from 126 on admit to 130 on 8/16 to back to normal at 138 on 8/17 consistent with hypovolemic hyponatremia. Creatinien level also elevated at above baseline at 2.2 on admit and now back down to 1.7 on 8/17 after IV Albumin and normal saline and thus also consistent with hypovolemia causing JACQUE. Patient note dot have ascites on admit related to her cirrhosis of liver and IR consulted and 1.1 liters of ascitic fluid removed by IR on 8/16 without complications. Ascitic fluid only had 71 WBC so not consistent with SBP. SAAG 1.9 consistent with ascites related to portal HTN  and consistent with her history of cirrhosis in transplanted liver. Hepatology consulted and also following for her liver transplant and cirrhosis and continuing with home immunosuppression in the hospital for her liver transplant.     Interval History: Patient transferred from resident hospital team to [facility] Medicine team for continued hospital care this am. Patient admitted with altered mental status felt related to hyponatremia noted on admit with sodium 126 in patient with known liver transplant in 9/2002 complicated by post transplant cirrhosis of the liver and followed by Hepatology as outpatient. Patient also note dot have JACQUE on admit with creatinine of 2.2 and baseline close to 1.5 but has alan very variable in past 3 months. Nephrology consulted for JACQUE and hyponatremia. Patient given IV Albumin and 1 liter of normal saline and today her JACQUE much improved and down to creatinine 1.7 and hyponatremia resolved and sodium up to 138. Patient underwent therapeutic paracentesis yesterday for ascites and 1.1 liters of ascitic fluid removed and not consistent with SBP as only had 71 WBCs. Patient states she feel less confused. Headache resolved and abdominal pain improved today. Patient is awake and alert and oriented x 3 this am. Other labs reviewed. Platelet count low at 44,000 and was 50,000 yesterday and his chronic thrombocytopenia related to her liver disease. Potassium low at 3.4 and will replace with oral potassium today. Hgb down to 7.0 today for 7.4 yesterday and 8.2 on admit. Patient has known iron deficiency anemia so has low Hgb at baseline around 8.5. No clinical signs of bleeding. Possibly hemodilutional effect but Hgb down to 7 so would benefit from blood transfusion today so patient typed and crossed and consented for blood and plan to transfuse 1 unit of PRBCs today. Appreciate Hepatology and Nephrology assistance on this case.     Review of Systems   Constitutional:  Negative for fever.    Respiratory:  Negative for cough and shortness of breath.    Cardiovascular:  Negative for chest pain.   Gastrointestinal:  Positive for abdominal pain. Negative for blood in stool, nausea and vomiting.   Neurological:  Negative for dizziness.   Psychiatric/Behavioral:  Positive for confusion. Negative for agitation.      Objective:     Vital Signs (Most Recent):  Temp: 98 °F (36.7 °C) (08/17/24 1234)  Pulse: 84 (08/17/24 1234)  Resp: 16 (08/17/24 1234)  BP: 132/61 (08/17/24 1234)  SpO2: 98 % (08/17/24 1234) on room air  Vital Signs (24h Range):  Temp:  [97.3 °F (36.3 °C)-98.2 °F (36.8 °C)] 98 °F (36.7 °C)  Pulse:  [70-86] 84  Resp:  [16-20] 16  SpO2:  [97 %-100 %] 98 %  BP: (128-164)/(60-87) 132/61     Weight: 61.7 kg (136 lb)  Body mass index is 27.47 kg/m².    Intake/Output Summary (Last 24 hours) at 8/17/2024 1258  Last data filed at 8/17/2024 0639  Gross per 24 hour   Intake --   Output 700 ml   Net -700 ml         Physical Exam  Vitals and nursing note reviewed.   Constitutional:       General: She is awake. She is not in acute distress.     Appearance: Normal appearance. She is not ill-appearing.      Comments: Patient lying in hospital be din no distress and awake and alert.    Eyes:      General: Scleral icterus present.   Neck:      Vascular: No JVD.   Cardiovascular:      Rate and Rhythm: Normal rate and regular rhythm.      Heart sounds: Normal heart sounds. No murmur heard.  Pulmonary:      Effort: Pulmonary effort is normal. No respiratory distress.      Breath sounds: Normal breath sounds. No wheezing.   Abdominal:      General: Abdomen is flat. Bowel sounds are normal. There is no distension.      Palpations: Abdomen is soft.      Tenderness: There is abdominal tenderness (Mild diffuse tenderness).   Musculoskeletal:      Right lower leg: No edema.      Left lower leg: No edema.   Skin:     General: Skin is warm.      Coloration: Skin is jaundiced.      Findings: No erythema.   Neurological:       Mental Status: She is alert and oriented to person, place, and time.   Psychiatric:         Mood and Affect: Mood normal.         Behavior: Behavior normal. Behavior is cooperative.             Significant Labs: CBC:   Recent Labs   Lab 08/15/24  1921 08/16/24  0444 08/17/24  0440   WBC 3.49* 2.68* 2.60*   HGB 8.2* 7.4* 7.0*   HCT 23.6* 22.4* 20.7*   PLT 54* 50* 44*     CMP:   Recent Labs   Lab 08/15/24  1921 08/16/24  0021 08/16/24 0444 08/17/24 0440 08/17/24  0933   *   < > 130*  130* 137 138   K 4.9   < > 4.3  4.3 3.4* 3.4*   CL 99   < > 104  104 109 110   CO2 18*   < > 18*  18* 18* 18*   GLU 82   < > 101  101 87 95   BUN 38*   < > 37*  37* 25* 23*   CREATININE 2.2*   < > 2.4*  2.4* 1.7* 1.7*   CALCIUM 8.8   < > 8.6*  8.6* 8.8 8.8   PROT 5.8*  --  5.3* 5.4*  --    ALBUMIN 2.5*  --  2.3* 3.0*  --    BILITOT 3.9*  --  3.5* 3.6*  --    ALKPHOS 390*  --  359* 301*  --    AST 61*  --  53* 43*  --    ALT 34  --  33 24  --    ANIONGAP 9   < > 8  8 10 10    < > = values in this interval not displayed.     Coagulation:   Recent Labs   Lab 08/17/24 0440   INR 1.5*   APTT 36.8*     Magnesium:   Recent Labs   Lab 08/16/24 0444 08/17/24 0440   MG 2.2 1.9       Significant Imaging: I have reviewed all pertinent imaging results/findings within the past 24 hours.    Assessment/Plan:      * Hyponatremia  Improved on 8/17. Sodium back to normal at 138 on 8/17 after given IVF's and IV Albumin.  Hyponatremia is likely due to hypovolemia as responded to 1 liter of normal saline and IV albumin for volume expansion. The patient's most recent sodium results are listed below.  Recent Labs     08/16/24  0444 08/17/24  0440 08/17/24  0933   *  130* 137 138         Plan  Appears Euvolemic on exam currently so no further volume expansion at this time. Continue to hold home diuretics (Lasix).   Urine studies: U Cr 55, U Protein <7, Na 12, Osm 248  Nephro consulted and given IV Albumin and 1 liter of normal saline as  per recs. and appreciate recs and assistance on case.   Monitor sodium level with daily CMP.     Acute kidney injury superimposed on CKD 3a  JACQUE improving on 8/17. Patient with known CKD stage 3a at baseline with baseline creatinine around 1.5. Creatinine 2.2 on admit and went to 2.4 on 8/16 but down to 1.7 on 8/17.   Nephrology consulted and appreciate recs and unlikely due to HRS with patient having elevated blood pressures. Suspect JACQUE related to pre-renal azotemia due to hypovolemia at this time.  Plan:  - Patient given 1 L Normal saline over 10 hrs and IV Albumin by Nephrology and appreciate recs.   - Urine studies consistent with prerenal azotemia.   - Monitor renal function with daily BMPs.   - Strict I&Os, close monitoring of UOP.  - Replace electrolytes PRN, goal K/Phos/Mg 4/3/2  [ ] Avoid nephrotoxic agents when feasible (NSAIDs, ACEi/ARB, IV radiocontrast, gadolinium, etc.)  [ ] Renally dose all meds to eGFR  [ ] Goal MAP > 65 mmHg  [ ] No acute indications for RRT at this time      Iron deficiency anemia secondary to inadequate dietary iron intake  Anemia is likely due to chronic blood loss resulting in iron deficiency anemia and chronic disease due to Chronic liver disease and CKD. Hgb 8.2 on admit and close to baseline level but down to 7 on 8/17. Likely combination of her chronic anemia and hemodilution as has received IVF's and IV Albumin in hospital for hyponatremia and JACQUE. No clinical signs of active bleeding. Most recent hemoglobin and hematocrit are listed below.  Recent Labs     08/15/24  1921 08/16/24  0444 08/17/24  0440   HGB 8.2* 7.4* 7.0*   HCT 23.6* 22.4* 20.7*     Plan  - Monitor serial CBC: Daily  - Transfuse PRBC if patient becomes hemodynamically unstable, symptomatic or H/H drops below 7/21.  - Patient has not received any PRBC transfusions to date  - Patient's anemia is currently worsening. Will adjust treatment as follows: Plan to transfuse 1 unit of PRBCs on 8/17 to treat her  worsening anemia.  - Monitor for any clinical signs of bleeding.       Long-term use of immunosuppressant medication  Patient on chronic Immunosurpression for liver transplant. Hepatology managing immunosuppression in hospital. Continue home Tacrolimus po BID and monitor daily levels in the hospital.    Chronic rejection of liver transplant  MELD 3.0: 23 at 2024  9:33 AM  MELD-Na: 21 at 2024  9:33 AM  Calculated from:  Serum Creatinine: 1.7 mg/dL at 2024  9:33 AM  Serum Sodium: 138 mmol/L (Using max of 137 mmol/L) at 2024  9:33 AM  Total Bilirubin: 3.6 mg/dL at 2024  4:40 AM  Serum Albumin: 3.0 g/dL at 2024  4:40 AM  INR(ratio): 1.5 at 2024  4:40 AM  Age at listin years  Sex: Female at 2024  9:33 AM    Plan:  Daily liver labs (CMP, Coags) to monitor synthetic function.   Continue home Lactulose (titrate to 3-4 BMs a day) and Rifaximin to treat known hepatic encephalopathy. Ammonia 59 on admit.   Hepatology consulted and following and appreciate assistance.   Patient not a candidate for repeat transplant due to multiple co morbid conditions and repeated abdominal surgeries. Unfortunately, there's no further treatment options for her. They recommend palliative medicine for her.       S/P liver transplant 2002 for von Gierke disease  Patient with known liver transplant in 2002. Hepatology consulted to assist in immunosuppressive medicine management and plan to continue home Tacrolimus to treat.  Monitor daily Tacrolimus levels in hospital.   Appreciate Hepatology assistance on case.       Pancytopenia  Chronic condition. Patient with known pancytopenia and likely combination of liver cirrhosis and chronic immunosuppression.   Patient with known anemia, leukopenia and thrombocytopenia and blood counts at baseline.  Monitor daily CBC to watch her blood counts.     Hypokalemia  Patient's most recent potassium results are listed below.   Recent Labs     24  0444  08/17/24  0440 08/17/24  0933   K 4.3  4.3 3.4* 3.4*     Plan  - Replete potassium per protocol  - Monitor potassium Daily  - Patient's hypokalemia is worsening. Will adjust treatment as follows: Will replace with oral potassium chloride 40 meq po x 1 dose on 8/17.      Acute metabolic encephalopathy  Confusion improving on 8/17 with correction of sodium level.   Patient with confusion on admit and suspect related to metabolic cause and likely related to hyponatremia.   Doubt infectious as no source. Paracentesis done on 8/16 and no evidence of SBP. U/A on admit with no infection  CT scan of head on admit unremarkable.  Patient not hypoxic.  Ammonia mildly elevated at 59 on admit and on Lactulose and Rifaximin to treat and maybe contributing to AMS.       Acquired hypothyroidism  Chronic and controlled. Continue home Levothyroxine to treat.       SIADH (syndrome of inappropriate ADH production)  Noted on chart review to have prior history of SIADH but likely not contributing to current picture as Urine Na low, and Serum and urine Osm normal. Doubt current cause of hyponatremia as sodium corrected with volume expansion.       Anxiety  Chronic and controlled. Avoid benzos in light of her confusion. Continue home Effexor to treat.       VTE Risk Mitigation (From admission, onward)           Ordered     IP VTE LOW RISK PATIENT  Once         08/15/24 2148     Place sequential compression device  Until discontinued         08/15/24 2148                    Discharge Planning   MARILEE: 8/17/2024     Code Status: Full Code   Is the patient medically ready for discharge?:     Reason for patient still in hospital (select all that apply): Patient new problem and Patient trending condition  Discharge Plan A: Home with family          Jeanine Jiménez MD  Department of Hospital Medicine   [facility]

## 2024-08-17 NOTE — ASSESSMENT & PLAN NOTE
Confusion improving on 8/17 with correction of sodium level.   Patient with confusion on admit and suspect related to metabolic cause and likely related to hyponatremia.   Doubt infectious as no source. Paracentesis done on 8/16 and no evidence of SBP. U/A on admit with no infection  CT scan of head on admit unremarkable.  Patient not hypoxic.  Ammonia mildly elevated at 59 on admit and on Lactulose and Rifaximin to treat and maybe contributing to AMS.

## 2024-08-17 NOTE — ASSESSMENT & PLAN NOTE
Chronic and controlled. Avoid benzos in light of her confusion. Continue home Effexor to treat.

## 2024-08-17 NOTE — SUBJECTIVE & OBJECTIVE
Interval History: Patient transferred from resident hospital team to [facility] Medicine team for continued hospital care this am. Patient admitted with altered mental status felt related to hyponatremia noted on admit with sodium 126 in patient with known liver transplant in 9/2002 complicated by post transplant cirrhosis of the liver and followed by Hepatology as outpatient. Patient also note dot have JACQUE on admit with creatinine of 2.2 and baseline close to 1.5 but has alan very variable in past 3 months. Nephrology consulted for JACQUE and hyponatremia. Patient given IV Albumin and 1 liter of normal saline and today her JACQUE much improved and down to creatinine 1.7 and hyponatremia resolved and sodium up to 138. Patient underwent therapeutic paracentesis yesterday for ascites and 1.1 liters of ascitic fluid removed and not consistent with SBP as only had 71 WBCs. Patient states she feel less confused. Headache resolved and abdominal pain improved today. Patient is awake and alert and oriented x 3 this am. Other labs reviewed. Platelet count low at 44,000 and was 50,000 yesterday and his chronic thrombocytopenia related to her liver disease. Potassium low at 3.4 and will replace with oral potassium today. Hgb down to 7.0 today for 7.4 yesterday and 8.2 on admit. Patient has known iron deficiency anemia so has low Hgb at baseline around 8.5. No clinical signs of bleeding. Possibly hemodilutional effect but Hgb down to 7 so would benefit from blood transfusion today so patient typed and crossed and consented for blood and plan to transfuse 1 unit of PRBCs today. Appreciate Hepatology and Nephrology assistance on this case.     Review of Systems   Constitutional:  Negative for fever.   Respiratory:  Negative for cough and shortness of breath.    Cardiovascular:  Negative for chest pain.   Gastrointestinal:  Positive for abdominal pain. Negative for blood in stool, nausea and vomiting.   Neurological:  Negative for  dizziness.   Psychiatric/Behavioral:  Positive for confusion. Negative for agitation.      Objective:     Vital Signs (Most Recent):  Temp: 98 °F (36.7 °C) (08/17/24 1234)  Pulse: 84 (08/17/24 1234)  Resp: 16 (08/17/24 1234)  BP: 132/61 (08/17/24 1234)  SpO2: 98 % (08/17/24 1234) on room air  Vital Signs (24h Range):  Temp:  [97.3 °F (36.3 °C)-98.2 °F (36.8 °C)] 98 °F (36.7 °C)  Pulse:  [70-86] 84  Resp:  [16-20] 16  SpO2:  [97 %-100 %] 98 %  BP: (128-164)/(60-87) 132/61     Weight: 61.7 kg (136 lb)  Body mass index is 27.47 kg/m².    Intake/Output Summary (Last 24 hours) at 8/17/2024 1258  Last data filed at 8/17/2024 0639  Gross per 24 hour   Intake --   Output 700 ml   Net -700 ml         Physical Exam  Vitals and nursing note reviewed.   Constitutional:       General: She is awake. She is not in acute distress.     Appearance: Normal appearance. She is not ill-appearing.      Comments: Patient lying in hospital be din no distress and awake and alert.    Eyes:      General: Scleral icterus present.   Neck:      Vascular: No JVD.   Cardiovascular:      Rate and Rhythm: Normal rate and regular rhythm.      Heart sounds: Normal heart sounds. No murmur heard.  Pulmonary:      Effort: Pulmonary effort is normal. No respiratory distress.      Breath sounds: Normal breath sounds. No wheezing.   Abdominal:      General: Abdomen is flat. Bowel sounds are normal. There is no distension.      Palpations: Abdomen is soft.      Tenderness: There is abdominal tenderness (Mild diffuse tenderness).   Musculoskeletal:      Right lower leg: No edema.      Left lower leg: No edema.   Skin:     General: Skin is warm.      Coloration: Skin is jaundiced.      Findings: No erythema.   Neurological:      Mental Status: She is alert and oriented to person, place, and time.   Psychiatric:         Mood and Affect: Mood normal.         Behavior: Behavior normal. Behavior is cooperative.             Significant Labs: CBC:   Recent Labs   Lab  08/15/24  1921 08/16/24  0444 08/17/24  0440   WBC 3.49* 2.68* 2.60*   HGB 8.2* 7.4* 7.0*   HCT 23.6* 22.4* 20.7*   PLT 54* 50* 44*     CMP:   Recent Labs   Lab 08/15/24  1921 08/16/24  0021 08/16/24 0444 08/17/24 0440 08/17/24  0933   *   < > 130*  130* 137 138   K 4.9   < > 4.3  4.3 3.4* 3.4*   CL 99   < > 104  104 109 110   CO2 18*   < > 18*  18* 18* 18*   GLU 82   < > 101  101 87 95   BUN 38*   < > 37*  37* 25* 23*   CREATININE 2.2*   < > 2.4*  2.4* 1.7* 1.7*   CALCIUM 8.8   < > 8.6*  8.6* 8.8 8.8   PROT 5.8*  --  5.3* 5.4*  --    ALBUMIN 2.5*  --  2.3* 3.0*  --    BILITOT 3.9*  --  3.5* 3.6*  --    ALKPHOS 390*  --  359* 301*  --    AST 61*  --  53* 43*  --    ALT 34  --  33 24  --    ANIONGAP 9   < > 8  8 10 10    < > = values in this interval not displayed.     Coagulation:   Recent Labs   Lab 08/17/24 0440   INR 1.5*   APTT 36.8*     Magnesium:   Recent Labs   Lab 08/16/24 0444 08/17/24 0440   MG 2.2 1.9       Significant Imaging: I have reviewed all pertinent imaging results/findings within the past 24 hours.

## 2024-08-17 NOTE — PLAN OF CARE
08/17/24 1016   Discharge Assessment   Confirmed/corrected address, phone number and insurance Yes   Confirmed Demographics Correct on Facesheet   Source of Information patient   Does patient/caregiver understand observation status Yes  (patient is inpatient)   Communicated MARILEE with patient/caregiver Yes   Reason For Admission Hyponatrenia   People in Home spouse   Facility Arrived From: Home   Do you expect to return to your current living situation? Yes   Do you have help at home or someone to help you manage your care at home? Yes   Who are your caregiver(s) and their phone number(s)?    Prior to hospitilization cognitive status: Alert/Oriented   Current cognitive status: Alert/Oriented   Walking or Climbing Stairs Difficulty yes   Walking or Climbing Stairs ambulation difficulty, requires equipment   Mobility Management rolling walker   Dressing/Bathing Difficulty no   Dressing/Bathing bathing difficulty, assistance 1 person   Dressing/Bathing Management has grab bars in the bathroom   Home Accessibility wheelchair accessible   Home Layout Able to live on 1st floor   Equipment Currently Used at Home walker, rolling   Readmission within 30 days? No   Patient currently being followed by outpatient case management? No   Do you currently have service(s) that help you manage your care at home? No   Do you take prescription medications? Yes   Do you have prescription coverage? Yes   Coverage [employer] Managed medicare   Do you have any problems affording any of your prescribed medications? No   Is the patient taking medications as prescribed? yes   Who is going to help you get home at discharge?    How do you get to doctors appointments? family or friend will provide   Are you on dialysis? No   Do you take coumadin? No   Discharge Plan A Home with family   Discharge Plan B Home   DME Needed Upon Discharge  none   Discharge Plan discussed with: Patient   Name(s) and Number(s) Kody Hernandez -   [phone]   Transition of Care Barriers None   Physical Activity   On average, how many days per week do you engage in moderate to strenuous exercise (like a brisk walk)? 0 days   On average, how many minutes do you engage in exercise at this level? 0 min   Financial Resource Strain   How hard is it for you to pay for the very basics like food, housing, medical care, and heating? Not hard   Housing Stability   In the last 12 months, was there a time when you were not able to pay the mortgage or rent on time? N   At any time in the past 12 months, were you homeless or living in a shelter (including now)? N   Transportation Needs   Has the lack of transportation kept you from medical appointments, meetings, work or from getting things needed for daily living? No   Food Insecurity   Within the past 12 months, you worried that your food would run out before you got the money to buy more. Never true   Within the past 12 months, the food you bought just didn't last and you didn't have money to get more. Never true   Stress   Do you feel stress - tense, restless, nervous, or anxious, or unable to sleep at night because your mind is troubled all the time - these days? Not at all   Social Isolation   How often do you feel lonely or isolated from those around you?  Never   Alcohol Use   Q1: How often do you have a drink containing alcohol? Never   Q2: How many drinks containing alcohol do you have on a typical day when you are drinking? None   Q3: How often do you have six or more drinks on one occasion? Never   Utilities   In the past 12 months has the electric, gas, oil, or water company threatened to shut off services in your home? No   Health Literacy   How often do you need to have someone help you when you read instructions, pamphlets, or other written material from your doctor or pharmacy? Never   Transportation Needs   In the past 12 months, has lack of transportation kept you from medical appointments or from  getting medications? no   In the past 12 months, has lack of transportation kept you from meetings, work, or from getting things needed for daily living? No   Social Connections   In a typical week, how many times do you talk on the phone with family, friends, or neighbors? Never   How often do you get together with friends or relatives? Never   How often do you attend Adventism or Jain services? Never   Do you belong to any clubs or organizations such as Adventism groups, unions, fraternal or athletic groups, or school groups? No   How often do you attend meetings of the clubs or organizations you belong to? Never   Are you , , , , never , or living with a partner?    OTHER   Name(s) of People in Home Kody Hernandez,spouse at [phone]   SW met with patient at bedside to complete discharge planning assessment.  Patient alert and oriented xs 4.  Patient verified all demographic information on facesheet is correct.  Patient verified PCP is Dr. David Lorenzo . Patient verified primary health insurance is [employer] Managed medicare . Patient with lives with her spouse.  Patient not on dialysis or medication coumadin.  Patient with no 30 day admission.  Patient with no financial issues at this time.  Patient family will provide transportation upon discharge from facility.  Patient independent with ADLs, live with spouse, drives self.         No Residual Tumor Seen Histology Text: There were no malignant cells seen in the sections examined.

## 2024-08-17 NOTE — ASSESSMENT & PLAN NOTE
Improved on 8/17. Sodium back to normal at 138 on 8/17 after given IVF's and IV Albumin.  Hyponatremia is likely due to hypovolemia as responded to 1 liter of normal saline and IV albumin for volume expansion. The patient's most recent sodium results are listed below.  Recent Labs     08/16/24  0444 08/17/24  0440 08/17/24  0933   *  130* 137 138         Plan  Appears Euvolemic on exam currently so no further volume expansion at this time. Continue to hold home diuretics (Lasix).   Urine studies: U Cr 55, U Protein <7, Na 12, Osm 248  Nephro consulted and given IV Albumin and 1 liter of normal saline as per recs. and appreciate recs and assistance on case.   Monitor sodium level with daily CMP.

## 2024-08-17 NOTE — PROGRESS NOTES
[facility] - Med Surg  Initial Discharge Assessment       Primary Care Provider: David Lorenzo MD    Admission Diagnosis: Hyponatremia [E87.1]  Altered mental status [R41.82]  Chest pain [R07.9]  Acute renal failure superimposed on chronic kidney disease, unspecified acute renal failure type, unspecified CKD stage [N17.9, N18.9]    Admission Date: 8/15/2024  Expected Discharge Date: 8/17/2024    Transition of Care Barriers: None    Payor: [employer] MANAGED MEDICARE / Plan: HU[employer] MEDICARE PPO / Product Type: Medicare Advantage /     Extended Emergency Contact Information  Primary Emergency Contact: Kody Hernandez  Address: [address] [place] [address] [place] [address] [place]  Work Phone: [phone]  Mobile Phone: [phone]  Relation: Spouse    Discharge Plan A: Home with family  Discharge Plan B: Home      [id number] - [place], [place] - [address] AT [facility] [address]  [address]  [place] [address]  Phone: [phone] Fax: [phone]      Initial Assessment (most recent)       Adult Discharge Assessment - 08/17/24 1016          Discharge Assessment    Confirmed/corrected address, phone number and insurance Yes     Confirmed Demographics Correct on Facesheet     Source of Information patient     Does patient/caregiver understand observation status Yes   patient is inpatient    Communicated MARILEE with patient/caregiver Yes     Reason For Admission Hyponatrenia     People in Home spouse     Facility Arrived From: Home     Do you expect to return to your current living situation? Yes     Do you have help at home or someone to help you manage your care at home? Yes     Who are your caregiver(s) and their phone number(s)?      Prior to hospitilization cognitive status: Alert/Oriented     Current cognitive status: Alert/Oriented     Walking or Climbing Stairs Difficulty yes     Walking or Climbing Stairs ambulation difficulty, requires equipment      Mobility Management rolling walker     Dressing/Bathing Difficulty no     Dressing/Bathing bathing difficulty, assistance 1 person     Dressing/Bathing Management has grab bars in the bathroom     Home Accessibility wheelchair accessible     Home Layout Able to live on 1st floor     Equipment Currently Used at Home walker, rolling     Readmission within 30 days? No     Patient currently being followed by outpatient case management? No     Do you currently have service(s) that help you manage your care at home? No     Do you take prescription medications? Yes     Do you have prescription coverage? Yes     Coverage [employer] Managed medicare     Do you have any problems affording any of your prescribed medications? No     Is the patient taking medications as prescribed? yes     Who is going to help you get home at discharge?      How do you get to doctors appointments? family or friend will provide     Are you on dialysis? No     Do you take coumadin? No     Discharge Plan A Home with family     Discharge Plan B Home     DME Needed Upon Discharge  none     Discharge Plan discussed with: Patient     Name(s) and Number(s) Kody Hernandez -  [phone]     Transition of Care Barriers None        Physical Activity    On average, how many days per week do you engage in moderate to strenuous exercise (like a brisk walk)? 0 days     On average, how many minutes do you engage in exercise at this level? 0 min        Financial Resource Strain    How hard is it for you to pay for the very basics like food, housing, medical care, and heating? Not hard at all        Housing Stability    In the last 12 months, was there a time when you were not able to pay the mortgage or rent on time? No     At any time in the past 12 months, were you homeless or living in a shelter (including now)? No        Transportation Needs    Has the lack of transportation kept you from medical appointments, meetings, work or from getting things  needed for daily living? No        Food Insecurity    Within the past 12 months, you worried that your food would run out before you got the money to buy more. Never true     Within the past 12 months, the food you bought just didn't last and you didn't have money to get more. Never true        Stress    Do you feel stress - tense, restless, nervous, or anxious, or unable to sleep at night because your mind is troubled all the time - these days? Not at all        Social Isolation    How often do you feel lonely or isolated from those around you?  Never        Alcohol Use    Q1: How often do you have a drink containing alcohol? Never     Q2: How many drinks containing alcohol do you have on a typical day when you are drinking? Patient does not drink     Q3: How often do you have six or more drinks on one occasion? Never        Utilities    In the past 12 months has the electric, gas, oil, or water company threatened to shut off services in your home? No        Health Literacy    How often do you need to have someone help you when you read instructions, pamphlets, or other written material from your doctor or pharmacy? Never        Transportation Needs    In the past 12 months, has lack of transportation kept you from medical appointments or from getting medications? No     In the past 12 months, has lack of transportation kept you from meetings, work, or from getting things needed for daily living? No        Social Connections    In a typical week, how many times do you talk on the phone with family, friends, or neighbors? Never     How often do you get together with friends or relatives? Never     How often do you attend Episcopal or Denominational services? Never     Do you belong to any clubs or organizations such as Episcopal groups, unions, fraternal or athletic groups, or school groups? No     How often do you attend meetings of the clubs or organizations you belong to? Never     Are you , , ,  , never , or living with a partner?         OTHER    Name(s) of People in Home Kody Hernandez,spouse at [phone]

## 2024-08-17 NOTE — NURSING
Nurses Note -- 4 Eyes      8/17/2024   3:05 AM      Skin assessed during: Admit      [x] No Altered Skin Integrity Present    []Prevention Measures Documented      [] Yes- Altered Skin Integrity Present or Discovered   [] LDA Added if Not in Epic (Describe Wound)   [] New Altered Skin Integrity was Present on Admit and Documented in LDA   [] Wound Image Taken    Wound Care Consulted? No    Attending Nurse:Princess Manning RN/Staff Member:  Me'Aidee

## 2024-08-17 NOTE — NURSING
Patient requested purewick when arrived to the floor. Pt. Removed purewick times 2 and urinated on the floor, then call the nurses station to say she peed on the floor.

## 2024-08-17 NOTE — ASSESSMENT & PLAN NOTE
Noted on chart review to have prior history of SIADH but likely not contributing to current picture as Urine Na low, and Serum and urine Osm normal. Doubt current cause of hyponatremia as sodium corrected with volume expansion.

## 2024-08-18 LAB
ALBUMIN SERPL BCP-MCNC: 3.7 G/DL (ref 3.5–5.2)
ALP SERPL-CCNC: 249 U/L (ref 55–135)
ALT SERPL W/O P-5'-P-CCNC: 22 U/L (ref 10–44)
ANION GAP SERPL CALC-SCNC: 6 MMOL/L (ref 8–16)
AST SERPL-CCNC: 39 U/L (ref 10–40)
BASOPHILS # BLD AUTO: 0.01 K/UL (ref 0–0.2)
BASOPHILS NFR BLD: 0.4 % (ref 0–1.9)
BILIRUB SERPL-MCNC: 4.8 MG/DL (ref 0.1–1)
BUN SERPL-MCNC: 19 MG/DL (ref 6–20)
CALCIUM SERPL-MCNC: 9.3 MG/DL (ref 8.7–10.5)
CHLORIDE SERPL-SCNC: 109 MMOL/L (ref 95–110)
CO2 SERPL-SCNC: 22 MMOL/L (ref 23–29)
CREAT SERPL-MCNC: 1.7 MG/DL (ref 0.5–1.4)
DIFFERENTIAL METHOD BLD: ABNORMAL
EOSINOPHIL # BLD AUTO: 0.1 K/UL (ref 0–0.5)
EOSINOPHIL NFR BLD: 4 % (ref 0–8)
ERYTHROCYTE [DISTWIDTH] IN BLOOD BY AUTOMATED COUNT: 15.1 % (ref 11.5–14.5)
EST. GFR  (NO RACE VARIABLE): 35 ML/MIN/1.73 M^2
GLUCOSE SERPL-MCNC: 92 MG/DL (ref 70–110)
HCT VFR BLD AUTO: 21.7 % (ref 37–48.5)
HGB BLD-MCNC: 7.3 G/DL (ref 12–16)
IMM GRANULOCYTES # BLD AUTO: 0 K/UL (ref 0–0.04)
IMM GRANULOCYTES NFR BLD AUTO: 0 % (ref 0–0.5)
INR PPP: 1.6 (ref 0.8–1.2)
LYMPHOCYTES # BLD AUTO: 0.5 K/UL (ref 1–4.8)
LYMPHOCYTES NFR BLD: 20.3 % (ref 18–48)
MCH RBC QN AUTO: 29.3 PG (ref 27–31)
MCHC RBC AUTO-ENTMCNC: 33.6 G/DL (ref 32–36)
MCV RBC AUTO: 87 FL (ref 82–98)
MONOCYTES # BLD AUTO: 0.4 K/UL (ref 0.3–1)
MONOCYTES NFR BLD: 18.9 % (ref 4–15)
NEUTROPHILS # BLD AUTO: 1.3 K/UL (ref 1.8–7.7)
NEUTROPHILS NFR BLD: 56.4 % (ref 38–73)
NRBC BLD-RTO: 0 /100 WBC
PLATELET # BLD AUTO: 44 K/UL (ref 150–450)
PMV BLD AUTO: 12.2 FL (ref 9.2–12.9)
POTASSIUM SERPL-SCNC: 3.6 MMOL/L (ref 3.5–5.1)
PROT SERPL-MCNC: 5.7 G/DL (ref 6–8.4)
PROTHROMBIN TIME: 17.5 SEC (ref 9–12.5)
RBC # BLD AUTO: 2.49 M/UL (ref 4–5.4)
SODIUM SERPL-SCNC: 137 MMOL/L (ref 136–145)
TACROLIMUS BLD-MCNC: 7 NG/ML (ref 5–15)
WBC # BLD AUTO: 2.27 K/UL (ref 3.9–12.7)

## 2024-08-18 PROCEDURE — 63600175 PHARM REV CODE 636 W HCPCS: Performed by: INTERNAL MEDICINE

## 2024-08-18 PROCEDURE — 25000003 PHARM REV CODE 250: Performed by: STUDENT IN AN ORGANIZED HEALTH CARE EDUCATION/TRAINING PROGRAM

## 2024-08-18 PROCEDURE — 25000003 PHARM REV CODE 250: Performed by: INTERNAL MEDICINE

## 2024-08-18 PROCEDURE — 63600175 PHARM REV CODE 636 W HCPCS: Performed by: HOSPITALIST

## 2024-08-18 PROCEDURE — 25000003 PHARM REV CODE 250

## 2024-08-18 PROCEDURE — 85025 COMPLETE CBC W/AUTO DIFF WBC: CPT

## 2024-08-18 PROCEDURE — 80197 ASSAY OF TACROLIMUS: CPT | Performed by: INTERNAL MEDICINE

## 2024-08-18 PROCEDURE — 85610 PROTHROMBIN TIME: CPT

## 2024-08-18 PROCEDURE — A4216 STERILE WATER/SALINE, 10 ML: HCPCS

## 2024-08-18 PROCEDURE — 21400001 HC TELEMETRY ROOM

## 2024-08-18 PROCEDURE — 80053 COMPREHEN METABOLIC PANEL: CPT

## 2024-08-18 PROCEDURE — 63600175 PHARM REV CODE 636 W HCPCS

## 2024-08-18 RX ORDER — PROCHLORPERAZINE EDISYLATE 5 MG/ML
5 INJECTION INTRAMUSCULAR; INTRAVENOUS EVERY 6 HOURS PRN
Status: DISCONTINUED | OUTPATIENT
Start: 2024-08-18 | End: 2024-08-19 | Stop reason: HOSPADM

## 2024-08-18 RX ADMIN — VENLAFAXINE HYDROCHLORIDE 150 MG: 75 CAPSULE, EXTENDED RELEASE ORAL at 08:08

## 2024-08-18 RX ADMIN — LACTULOSE 30 G: 20 SOLUTION ORAL at 08:08

## 2024-08-18 RX ADMIN — LEVOTHYROXINE SODIUM 75 MCG: 75 TABLET ORAL at 08:08

## 2024-08-18 RX ADMIN — PROCHLORPERAZINE EDISYLATE 5 MG: 5 INJECTION INTRAMUSCULAR; INTRAVENOUS at 09:08

## 2024-08-18 RX ADMIN — Medication 10 ML: at 12:08

## 2024-08-18 RX ADMIN — Medication 10 ML: at 05:08

## 2024-08-18 RX ADMIN — OXYCODONE HYDROCHLORIDE 5 MG: 5 TABLET ORAL at 08:08

## 2024-08-18 RX ADMIN — Medication 10 ML: at 01:08

## 2024-08-18 RX ADMIN — PANTOPRAZOLE SODIUM 40 MG: 40 TABLET, DELAYED RELEASE ORAL at 08:08

## 2024-08-18 RX ADMIN — TACROLIMUS 0.5 MG: 0.5 CAPSULE ORAL at 05:08

## 2024-08-18 RX ADMIN — Medication 10 ML: at 04:08

## 2024-08-18 RX ADMIN — CEFTRIAXONE 2 G: 2 INJECTION, POWDER, FOR SOLUTION INTRAMUSCULAR; INTRAVENOUS at 12:08

## 2024-08-18 RX ADMIN — RIFAXIMIN 550 MG: 550 TABLET ORAL at 08:08

## 2024-08-18 RX ADMIN — LACTULOSE 30 G: 20 SOLUTION ORAL at 02:08

## 2024-08-18 RX ADMIN — Medication 6 MG: at 09:08

## 2024-08-18 RX ADMIN — OXYCODONE HYDROCHLORIDE 5 MG: 5 TABLET ORAL at 12:08

## 2024-08-18 RX ADMIN — TACROLIMUS 0.5 MG: 0.5 CAPSULE ORAL at 08:08

## 2024-08-18 RX ADMIN — ONDANSETRON 4 MG: 2 INJECTION INTRAMUSCULAR; INTRAVENOUS at 05:08

## 2024-08-18 RX ADMIN — OXYCODONE HYDROCHLORIDE 5 MG: 5 TABLET ORAL at 02:08

## 2024-08-18 NOTE — ASSESSMENT & PLAN NOTE
Creatinine stable at 1.7 and appears to be back to her baseline function on 8/18.   JACQUE improving on 8/17. Patient with known CKD stage 3a at baseline with baseline creatinine around 1.5. Creatinine 2.2 on admit and went to 2.4 on 8/16 but down to 1.7 on 8/17.   Nephrology consulted and appreciate recs and unlikely due to HRS with patient having elevated blood pressures. Suspect JACQUE related to pre-renal azotemia due to hypovolemia at this time.  Plan:  - Patient given 1 L Normal saline over 10 hrs and IV Albumin by Nephrology and appreciate recs.   - Urine studies consistent with prerenal azotemia.   - Monitor renal function with daily BMPs.   - Strict I&Os, close monitoring of UOP.  - Replace electrolytes PRN, goal K/Phos/Mg 4/3/2  [ ] Avoid nephrotoxic agents when feasible (NSAIDs, ACEi/ARB, IV radiocontrast, gadolinium, etc.)  [ ] Renally dose all meds to eGFR  [ ] Goal MAP > 65 mmHg  [ ] No acute indications for RRT at this time

## 2024-08-18 NOTE — ASSESSMENT & PLAN NOTE
- Improved Hgb 7.3 on 8/18 after transfused 1 unit of PRBCs on 8/17 for Hgb 7.   - Anemia is likely due to chronic blood loss resulting in iron deficiency anemia and chronic disease due to Chronic liver disease and CKD. Hgb 8.2 on admit and close to baseline level but down to 7 on 8/17. Likely combination of her chronic anemia and hemodilution as has received IVF's and IV Albumin in hospital for hyponatremia and JACQUE. No clinical signs of active bleeding. Most recent hemoglobin and hematocrit are listed below.  Recent Labs     08/16/24  0444 08/17/24  0440 08/18/24  0451   HGB 7.4* 7.0* 7.3*   HCT 22.4* 20.7* 21.7*       Plan  - Monitor serial CBC: Daily  - Transfuse PRBC if patient becomes hemodynamically unstable, symptomatic or H/H drops below 7/21.  - Patient has not received any PRBC transfusions to date  - Patient's anemia is currently worsening. Will adjust treatment as follows: Plan to transfuse 1 unit of PRBCs on 8/17 to treat her worsening anemia.  - Monitor for any clinical signs of bleeding.

## 2024-08-18 NOTE — ASSESSMENT & PLAN NOTE
Confusion resolved on 8/18 so suspect hyponatremia played a role in her encephalopathy on admit.    Confusion improving on 8/17 with correction of sodium level.   Patient with confusion on admit and suspect related to metabolic cause and likely related to hyponatremia.   Doubt infectious as no source. Paracentesis done on 8/16 and no evidence of SBP. U/A on admit with no infection  CT scan of head on admit unremarkable.  Patient not hypoxic.  Ammonia mildly elevated at 59 on admit and on Lactulose and Rifaximin to treat and maybe contributing to AMS.

## 2024-08-18 NOTE — PLAN OF CARE
Problem: Adult Inpatient Plan of Care  Goal: Plan of Care Review  Outcome: Progressing  Goal: Patient-Specific Goal (Individualized)  Outcome: Progressing  Goal: Absence of Hospital-Acquired Illness or Injury  Outcome: Progressing  Goal: Optimal Comfort and Wellbeing  Outcome: Progressing  Goal: Readiness for Transition of Care  Outcome: Progressing     Problem: Infection  Goal: Absence of Infection Signs and Symptoms  Outcome: Progressing     Problem: Fall Injury Risk  Goal: Absence of Fall and Fall-Related Injury  Outcome: Progressing     Problem: Restraint, Nonviolent  Goal: Absence of Harm or Injury  Outcome: Progressing     Problem: Acute Kidney Injury/Impairment  Goal: Fluid and Electrolyte Balance  Outcome: Progressing  Goal: Improved Oral Intake  Outcome: Progressing  Goal: Effective Renal Function  Outcome: Progressing     Problem: Coping Ineffective  Goal: Effective Coping  Outcome: Progressing     Pt AAO x 4. Patient rested comfortably with no significant changes during the shift, remains free from falls and injuries. Side rails up x2, bed low and locked, bed alarm activated, call light and personal belongings within reach. VS remain stable and respirations even and unlabored. Hourly rounding to ensure safety and comfort. No grimace, cries or other signs of acute distress. Questions and concerns addressed and answered. Medication compliance. Pain control with PRN analgesic Oxycodone with positive results. Pt remains on continuous cardiac monitoring. Family remains at bedside. HOB and pillows adjusted for comfort. Reviewed importance of safety barriers and calling for assistance prn. Verbalized understanding and states she will call prn for needs. Plan of care discussed.    42 y/o F with PMHx of seizure and UC presents to the ED c/o rash. Pt states that she was exposed to poison ivy about a week ago. Pt went to urgent care and was given Cortisone cream. Pt states that rash is worsening. No fevers at this time. Pt's  developed rash first on his arm. Pt states she woke up one morning with rash. Allergic to PCN.

## 2024-08-18 NOTE — PROGRESS NOTES
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 (on chronic immunosuppression with tacrolimus), cirrhosis of transplant liver with portal hypertension, trace ascites, hepatic encephalopathy, Arnold-Chiari malformation, migraines, history of craniotomy, 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     She is unable to provide a meaningful history because she is altered. No family at bedside. She reports that she had 1 episode of nausea and vomiting last night. Also c/o of abdominal pain and headache, the later of which she says is related to her low Na.     HDS, AF, Na 126, CO2 18, BUN 38, Cr 2.2, , Protein 5.8, Bili 3.9, Direct 2.8, AST 61, Alt 34, Ammonia 59, Serum Osm 282, Ua w/ trace LE, 1.010 speficic grav, U cr 55, U protein <7, U Na 12, U Osm 248. Head CT nothing acute, Liver US shows Mildly elevated intrahepatic arterial resistive indices, noting improvement from 06/17/2024, which can be seen in transplant rejection or chronic liver disease. Unremarkable RP US.     Admitted to medicine for hyponatremia, JACQUE in a  transplant pt.     She was recently hospitalized about 2-3 weeks ago:   Labs done on 7/25/2024 showed acute on chronic hyponatremia (sodium 125 mmol/L,), hyperkalemia (potassium 5.8 mmol/L), acute kidney injury (BUN 44 mg/dL and creatinine 5.2 mg/dL, [baseline 1.0], elevated ammonia (200 umol/L).hyponatermia and JACQUE.  Due to her history of liver transplant, her case was discussed with Hepatology at [facility]. Hepatology was consulted as was planned prior to transfer. She was put on normal saline at 100 mL/hr. Sodium improved to 129 mmol/L by 7/29/2024 and acute kidney injury improved. Hepatology recommended stopping IV fluids and resuming diuretics at the previous doses before they were increased. IV fluids were stopped. BUN and creatinine continued to improve but sodium decreased a little to 127. Interventional Radiology was unable to find a pocket of fluid for therapeutic paracentesis, a problem she had in the past. She was discharged home. Sodium was 129 at discharge. She was advised to hold furosemide and spironolactone until she gets outpatient labs and follows up with her hepatologist.     Overview/Hospital Course:  Patient admitted with symptomatic hyponatremia. Nephrology consulted and urine studies done and concern for hypovolemic hyponatremia so given IV Albumin infusion and 1 litre of normal saline. Sodium improved from 126 on admit to 130 on 8/16 to back to normal at 138 on 8/17 consistent with hypovolemic hyponatremia. Creatinien level also elevated at above baseline at 2.2 on admit and now back down to 1.7 on 8/17 after IV Albumin and normal saline and thus also consistent with hypovolemia causing JACQUE. Patient note dot have ascites on admit related to her cirrhosis of liver and IR consulted and 1.1 liters of ascitic fluid removed by IR on 8/16 without complications. Ascitic fluid only had 71 WBC so not consistent with SBP. SAAG 1.9 consistent with ascites related to portal HTN  and consistent with her history of cirrhosis in transplanted liver. Hepatology consulted and also following for her liver transplant and cirrhosis and continuing with home immunosuppression in the hospital for her liver transplant. Hgb down to 7.0 on 8/17 after hydration and transfused 1 unit of PRBCs. Hgb improved to 7.3 on 8/18. Patient states she is feeling much better and still having some abdominal pain but improved from discharge. Hepatology recommending palliative care consult as patient not a candidate for future liver transplant and has had multiple recent admission to have goals of care discussion with patient I did speak with patient about what Hepatology stated and she was surprised as unaware she is not a liver transplant candidate for the future. Patient states she is not ready to make any life changing decisions at this time but I told her she did not have to make any decisions immediately but would be good to sit and talk to palliative care to see what her future options are and what goals of care she would want to make in the future as her liver disease is to likely progress and lead to further complications and likely hospitalizations and have impact on her quality of life. Creatinine stable at 1.7 on 8/18.     Interval History: Hgb improved to 7.3 on 8/18 from 7 yesterday after transfused 1 unit of PRBCs. Patient states she is feeling much better today and was actually standing at her bedside when I entered her room and not in any distress. She states she is still having some abdominal pain but improved from discharge. Hepatology recommending palliative care consult as patient not a candidate for future liver transplant and has had multiple recent admission to have goals of care discussion with patient I did speak with patient about what Hepatology stated and she was surprised as unaware she is not a liver transplant candidate for the future. Patient states she is not ready to make any life  changing decisions at this time but I told her she did not have to make any decisions immediately but would be good to sit and talk to palliative care to see what her future options are and what goals of care she would want to make in the future as her liver disease is to likely progress and lead to further complications and likely hospitalizations and have impact on her quality of life. Creatinine stable at 1.7 on 8/18 and was 1.7 on 8/17. Sodium stable at 137 and was 138 yesterday.     Review of Systems   Constitutional:  Negative for fever.   Respiratory:  Negative for shortness of breath.    Cardiovascular:  Negative for chest pain.   Gastrointestinal:  Positive for abdominal pain. Negative for blood in stool, nausea and vomiting.   Neurological:  Negative for dizziness.   Psychiatric/Behavioral:  Negative for agitation and confusion.      Objective:     Vital Signs (Most Recent):  Temp: 98.3 °F (36.8 °C) (08/18/24 1127)  Pulse: 70 (08/18/24 1127)  Resp: 18 (08/18/24 1411)  BP: 146/66 (08/18/24 1127)  SpO2: 94 % (08/18/24 1127) on room air  Vital Signs (24h Range):  Temp:  [97.7 °F (36.5 °C)-98.8 °F (37.1 °C)] 98.3 °F (36.8 °C)  Pulse:  [68-87] 70  Resp:  [16-98] 18  SpO2:  [94 %-98 %] 94 %  BP: (128-160)/(57-79) 146/66     Weight: 61.5 kg (135 lb 9.3 oz)  Body mass index is 27.38 kg/m².    Intake/Output Summary (Last 24 hours) at 8/18/2024 1424  Last data filed at 8/18/2024 0828  Gross per 24 hour   Intake 570.83 ml   Output 200 ml   Net 370.83 ml         Physical Exam  Vitals and nursing note reviewed.   Constitutional:       General: She is awake. She is not in acute distress.     Appearance: Normal appearance. She is not ill-appearing.      Comments: Patient standing up at bedside when I entered room and she states she has been walking around her room. Patient in no distress. Patient appeared very comfortable. Patient awake and alert and oriented.    Eyes:      General: Scleral icterus present.   Neck:       Vascular: No JVD.   Cardiovascular:      Rate and Rhythm: Normal rate and regular rhythm.      Heart sounds: Normal heart sounds. No murmur heard.  Pulmonary:      Effort: Pulmonary effort is normal. No respiratory distress.      Breath sounds: Normal breath sounds. No wheezing.   Abdominal:      General: Abdomen is flat. Bowel sounds are normal. There is no distension.      Palpations: Abdomen is soft.      Tenderness: There is abdominal tenderness (Mild diffuse tenderness). There is no guarding or rebound.   Musculoskeletal:      Right lower leg: No edema.      Left lower leg: No edema.   Skin:     General: Skin is warm.      Coloration: Skin is jaundiced.      Findings: No erythema.   Neurological:      Mental Status: She is alert and oriented to person, place, and time.   Psychiatric:         Mood and Affect: Mood normal.         Behavior: Behavior normal. Behavior is cooperative.         Thought Content: Thought content normal.             Significant Labs: CBC:   Recent Labs   Lab 08/17/24  0440 08/18/24  0451   WBC 2.60* 2.27*   HGB 7.0* 7.3*   HCT 20.7* 21.7*   PLT 44* 44*     CMP:   Recent Labs   Lab 08/17/24  0440 08/17/24  0933 08/18/24  0451    138 137   K 3.4* 3.4* 3.6    110 109   CO2 18* 18* 22*   GLU 87 95 92   BUN 25* 23* 19   CREATININE 1.7* 1.7* 1.7*   CALCIUM 8.8 8.8 9.3   PROT 5.4*  --  5.7*   ALBUMIN 3.0*  --  3.7   BILITOT 3.6*  --  4.8*   ALKPHOS 301*  --  249*   AST 43*  --  39   ALT 24  --  22   ANIONGAP 10 10 6*       Significant Imaging: I have reviewed all pertinent imaging results/findings within the past 24 hours.    Assessment/Plan:      * Hyponatremia  Sodium stable at 137 on 8/18 and resolved.   Improved on 8/17. Sodium back to normal at 138 on 8/17 after given IVF's and IV Albumin.  Hyponatremia is likely due to hypovolemia as responded to 1 liter of normal saline and IV albumin for volume expansion. The patient's most recent sodium results are listed below.  Recent  Labs     08/17/24  0440 08/17/24  0933 08/18/24  0451    138 137         Plan  Appears Euvolemic on exam currently so no further volume expansion at this time. Continue to hold home diuretics (Lasix).   Urine studies: U Cr 55, U Protein <7, Na 12, Osm 248  Nephro consulted and given IV Albumin and 1 liter of normal saline as per recs. and appreciate recs and assistance on case.   Monitor sodium level with daily CMP.     Acute kidney injury superimposed on CKD 3a  Creatinine stable at 1.7 and appears to be back to her baseline function on 8/18.   JACQUE improving on 8/17. Patient with known CKD stage 3a at baseline with baseline creatinine around 1.5. Creatinine 2.2 on admit and went to 2.4 on 8/16 but down to 1.7 on 8/17.   Nephrology consulted and appreciate recs and unlikely due to HRS with patient having elevated blood pressures. Suspect JACQUE related to pre-renal azotemia due to hypovolemia at this time.  Plan:  - Patient given 1 L Normal saline over 10 hrs and IV Albumin by Nephrology and appreciate recs.   - Urine studies consistent with prerenal azotemia.   - Monitor renal function with daily BMPs.   - Strict I&Os, close monitoring of UOP.  - Replace electrolytes PRN, goal K/Phos/Mg 4/3/2  [ ] Avoid nephrotoxic agents when feasible (NSAIDs, ACEi/ARB, IV radiocontrast, gadolinium, etc.)  [ ] Renally dose all meds to eGFR  [ ] Goal MAP > 65 mmHg  [ ] No acute indications for RRT at this time      Iron deficiency anemia secondary to inadequate dietary iron intake  - Improved Hgb 7.3 on 8/18 after transfused 1 unit of PRBCs on 8/17 for Hgb 7.   - Anemia is likely due to chronic blood loss resulting in iron deficiency anemia and chronic disease due to Chronic liver disease and CKD. Hgb 8.2 on admit and close to baseline level but down to 7 on 8/17. Likely combination of her chronic anemia and hemodilution as has received IVF's and IV Albumin in hospital for hyponatremia and JACQUE. No clinical signs of active  bleeding. Most recent hemoglobin and hematocrit are listed below.  Recent Labs     24  0444 24  0440 24  0451   HGB 7.4* 7.0* 7.3*   HCT 22.4* 20.7* 21.7*       Plan  - Monitor serial CBC: Daily  - Transfuse PRBC if patient becomes hemodynamically unstable, symptomatic or H/H drops below 7/21.  - Patient has not received any PRBC transfusions to date  - Patient's anemia is currently worsening. Will adjust treatment as follows: Plan to transfuse 1 unit of PRBCs on  to treat her worsening anemia.  - Monitor for any clinical signs of bleeding.       Long-term use of immunosuppressant medication  Patient on chronic Immunosurpression for liver transplant. Hepatology managing immunosuppression in hospital. Continue home Tacrolimus po BID and monitor daily levels in the hospital.    Chronic rejection of liver transplant  MELD 3.0: 25 at 2024  4:51 AM  MELD-Na: 23 at 2024  4:51 AM  Calculated from:  Serum Creatinine: 1.7 mg/dL at 2024  4:51 AM  Serum Sodium: 137 mmol/L at 2024  4:51 AM  Total Bilirubin: 4.8 mg/dL at 2024  4:51 AM  Serum Albumin: 3.7 g/dL (Using max of 3.5 g/dL) at 2024  4:51 AM  INR(ratio): 1.6 at 2024  4:51 AM  Age at listin years  Sex: Female at 2024  4:51 AM    Plan:  Daily liver labs (CMP, Coags) to monitor synthetic function.   Continue home Lactulose (titrate to 3-4 BMs a day) and Rifaximin to treat known hepatic encephalopathy. Ammonia 59 on admit.   Hepatology consulted and following and appreciate assistance.   Patient not a candidate for repeat transplant due to multiple co morbid conditions and repeated abdominal surgeries. Unfortunately, there's no further treatment options for her. They recommend palliative medicine for her. I spoke with patient about this issue on  and palliative care consulted but she was unaware she was not a candidate for future liver transplant but I discussed this with her on  and recommended to  start thinking about her goals of care for her future due to her advanced liver disease and having recent complications related to her liver cirrhosis.       S/P liver transplant 9/23/2002 for von Gierke disease  Patient with known liver transplant in 9/2002. Hepatology consulted to assist in immunosuppressive medicine management and plan to continue home Tacrolimus to treat.  Monitor daily Tacrolimus levels in hospital.   Appreciate Hepatology assistance on case.       Pancytopenia  Stable. Chronic condition. Patient with known pancytopenia and likely combination of liver cirrhosis and chronic immunosuppression.   Patient with known anemia, leukopenia and thrombocytopenia and blood counts at baseline.  Monitor daily CBC to watch her blood counts.     Hypokalemia  Resolved on 8/18 after oral replacement on 8/17.   Patient's most recent potassium results are listed below.   Recent Labs     08/17/24  0440 08/17/24  0933 08/18/24  0451   K 3.4* 3.4* 3.6       Plan  - Replete potassium per protocol  - Monitor potassium Daily  - Patient's hypokalemia is worsening. Will adjust treatment as follows: Will replace with oral potassium chloride 40 meq po x 1 dose on 8/17.      Acute metabolic encephalopathy  Confusion resolved on 8/18 so suspect hyponatremia played a role in her encephalopathy on admit.    Confusion improving on 8/17 with correction of sodium level.   Patient with confusion on admit and suspect related to metabolic cause and likely related to hyponatremia.   Doubt infectious as no source. Paracentesis done on 8/16 and no evidence of SBP. U/A on admit with no infection  CT scan of head on admit unremarkable.  Patient not hypoxic.  Ammonia mildly elevated at 59 on admit and on Lactulose and Rifaximin to treat and maybe contributing to AMS.       Acquired hypothyroidism  Chronic and controlled. Continue home Levothyroxine to treat.       SIADH (syndrome of inappropriate ADH production)  Noted on chart review to  have prior history of SIADH but likely not contributing to current picture as Urine Na low, and Serum and urine Osm normal. Doubt current cause of hyponatremia as sodium corrected with volume expansion.       Anxiety  Chronic and controlled. Avoid benzos in light of her confusion. Continue home Effexor to treat.       VTE Risk Mitigation (From admission, onward)           Ordered     IP VTE LOW RISK PATIENT  Once         08/15/24 2148     Place sequential compression device  Until discontinued         08/15/24 2148                    Discharge Planning   MARILEE: 8/19/2024     Code Status: Full Code   Is the patient medically ready for discharge?:     Reason for patient still in hospital (select all that apply): Patient trending condition  Discharge Plan A: Home with family likely on 8/19.          Jeanine Jiménez MD  Department of Hospital Medicine   [facility]

## 2024-08-18 NOTE — ASSESSMENT & PLAN NOTE
Stable. Chronic condition. Patient with known pancytopenia and likely combination of liver cirrhosis and chronic immunosuppression.   Patient with known anemia, leukopenia and thrombocytopenia and blood counts at baseline.  Monitor daily CBC to watch her blood counts.

## 2024-08-18 NOTE — ASSESSMENT & PLAN NOTE
Sodium stable at 137 on 8/18 and resolved.   Improved on 8/17. Sodium back to normal at 138 on 8/17 after given IVF's and IV Albumin.  Hyponatremia is likely due to hypovolemia as responded to 1 liter of normal saline and IV albumin for volume expansion. The patient's most recent sodium results are listed below.  Recent Labs     08/17/24  0440 08/17/24  0933 08/18/24  0451    138 137         Plan  Appears Euvolemic on exam currently so no further volume expansion at this time. Continue to hold home diuretics (Lasix).   Urine studies: U Cr 55, U Protein <7, Na 12, Osm 248  Nephro consulted and given IV Albumin and 1 liter of normal saline as per recs. and appreciate recs and assistance on case.   Monitor sodium level with daily CMP.

## 2024-08-18 NOTE — ASSESSMENT & PLAN NOTE
Resolved on 8/18 after oral replacement on 8/17.   Patient's most recent potassium results are listed below.   Recent Labs     08/17/24  0440 08/17/24  0933 08/18/24  0451   K 3.4* 3.4* 3.6       Plan  - Replete potassium per protocol  - Monitor potassium Daily  - Patient's hypokalemia is worsening. Will adjust treatment as follows: Will replace with oral potassium chloride 40 meq po x 1 dose on 8/17.

## 2024-08-18 NOTE — SUBJECTIVE & OBJECTIVE
Interval History: Hgb improved to 7.3 on 8/18 from 7 yesterday after transfused 1 unit of PRBCs. Patient states she is feeling much better today and was actually standing at her bedside when I entered her room and not in any distress. She states she is still having some abdominal pain but improved from discharge. Hepatology recommending palliative care consult as patient not a candidate for future liver transplant and has had multiple recent admission to have goals of care discussion with patient I did speak with patient about what Hepatology stated and she was surprised as unaware she is not a liver transplant candidate for the future. Patient states she is not ready to make any life changing decisions at this time but I told her she did not have to make any decisions immediately but would be good to sit and talk to palliative care to see what her future options are and what goals of care she would want to make in the future as her liver disease is to likely progress and lead to further complications and likely hospitalizations and have impact on her quality of life. Creatinine stable at 1.7 on 8/18 and was 1.7 on 8/17. Sodium stable at 137 and was 138 yesterday.     Review of Systems   Constitutional:  Negative for fever.   Respiratory:  Negative for shortness of breath.    Cardiovascular:  Negative for chest pain.   Gastrointestinal:  Positive for abdominal pain. Negative for blood in stool, nausea and vomiting.   Neurological:  Negative for dizziness.   Psychiatric/Behavioral:  Negative for agitation and confusion.      Objective:     Vital Signs (Most Recent):  Temp: 98.3 °F (36.8 °C) (08/18/24 1127)  Pulse: 70 (08/18/24 1127)  Resp: 18 (08/18/24 1411)  BP: 146/66 (08/18/24 1127)  SpO2: 94 % (08/18/24 1127) on room air  Vital Signs (24h Range):  Temp:  [97.7 °F (36.5 °C)-98.8 °F (37.1 °C)] 98.3 °F (36.8 °C)  Pulse:  [68-87] 70  Resp:  [16-98] 18  SpO2:  [94 %-98 %] 94 %  BP: (128-160)/(57-79) 146/66     Weight:  61.5 kg (135 lb 9.3 oz)  Body mass index is 27.38 kg/m².    Intake/Output Summary (Last 24 hours) at 8/18/2024 1424  Last data filed at 8/18/2024 0828  Gross per 24 hour   Intake 570.83 ml   Output 200 ml   Net 370.83 ml         Physical Exam  Vitals and nursing note reviewed.   Constitutional:       General: She is awake. She is not in acute distress.     Appearance: Normal appearance. She is not ill-appearing.      Comments: Patient standing up at bedside when I entered room and she states she has been walking around her room. Patient in no distress. Patient appeared very comfortable. Patient awake and alert and oriented.    Eyes:      General: Scleral icterus present.   Neck:      Vascular: No JVD.   Cardiovascular:      Rate and Rhythm: Normal rate and regular rhythm.      Heart sounds: Normal heart sounds. No murmur heard.  Pulmonary:      Effort: Pulmonary effort is normal. No respiratory distress.      Breath sounds: Normal breath sounds. No wheezing.   Abdominal:      General: Abdomen is flat. Bowel sounds are normal. There is no distension.      Palpations: Abdomen is soft.      Tenderness: There is abdominal tenderness (Mild diffuse tenderness). There is no guarding or rebound.   Musculoskeletal:      Right lower leg: No edema.      Left lower leg: No edema.   Skin:     General: Skin is warm.      Coloration: Skin is jaundiced.      Findings: No erythema.   Neurological:      Mental Status: She is alert and oriented to person, place, and time.   Psychiatric:         Mood and Affect: Mood normal.         Behavior: Behavior normal. Behavior is cooperative.         Thought Content: Thought content normal.             Significant Labs: CBC:   Recent Labs   Lab 08/17/24  0440 08/18/24  0451   WBC 2.60* 2.27*   HGB 7.0* 7.3*   HCT 20.7* 21.7*   PLT 44* 44*     CMP:   Recent Labs   Lab 08/17/24  0440 08/17/24  0933 08/18/24  0451    138 137   K 3.4* 3.4* 3.6    110 109   CO2 18* 18* 22*   GLU 87 95 92    BUN 25* 23* 19   CREATININE 1.7* 1.7* 1.7*   CALCIUM 8.8 8.8 9.3   PROT 5.4*  --  5.7*   ALBUMIN 3.0*  --  3.7   BILITOT 3.6*  --  4.8*   ALKPHOS 301*  --  249*   AST 43*  --  39   ALT 24  --  22   ANIONGAP 10 10 6*       Significant Imaging: I have reviewed all pertinent imaging results/findings within the past 24 hours.

## 2024-08-18 NOTE — ASSESSMENT & PLAN NOTE
MELD 3.0: 25 at 2024  4:51 AM  MELD-Na: 23 at 2024  4:51 AM  Calculated from:  Serum Creatinine: 1.7 mg/dL at 2024  4:51 AM  Serum Sodium: 137 mmol/L at 2024  4:51 AM  Total Bilirubin: 4.8 mg/dL at 2024  4:51 AM  Serum Albumin: 3.7 g/dL (Using max of 3.5 g/dL) at 2024  4:51 AM  INR(ratio): 1.6 at 2024  4:51 AM  Age at listin years  Sex: Female at 2024  4:51 AM    Plan:  Daily liver labs (CMP, Coags) to monitor synthetic function.   Continue home Lactulose (titrate to 3-4 BMs a day) and Rifaximin to treat known hepatic encephalopathy. Ammonia 59 on admit.   Hepatology consulted and following and appreciate assistance.   Patient not a candidate for repeat transplant due to multiple co morbid conditions and repeated abdominal surgeries. Unfortunately, there's no further treatment options for her. They recommend palliative medicine for her. I spoke with patient about this issue on  and palliative care consulted but she was unaware she was not a candidate for future liver transplant but I discussed this with her on  and recommended to start thinking about her goals of care for her future due to her advanced liver disease and having recent complications related to her liver cirrhosis.

## 2024-08-18 NOTE — CONSULTS
Brief consult acknowledgement note:       Consult received, chart reviewed.    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 Admitted to medicine for hyponatremia, JACQUE in a transplant pt. Hepatology recommending palliative care consult as patient not a candidate for future liver transplant. Palliative care consulted for support.      Full consult to follow.       Thank you for the opportunity to care for this patient and family.       Please call with questions.       Ebonie Bennett MD  Palliative Medicine Department  [facility]

## 2024-08-19 VITALS
OXYGEN SATURATION: 97 % | RESPIRATION RATE: 18 BRPM | WEIGHT: 135.56 LBS | DIASTOLIC BLOOD PRESSURE: 70 MMHG | TEMPERATURE: 98 F | SYSTOLIC BLOOD PRESSURE: 158 MMHG | HEART RATE: 73 BPM | HEIGHT: 59 IN | BODY MASS INDEX: 27.33 KG/M2

## 2024-08-19 DIAGNOSIS — R11.0 NAUSEA: ICD-10-CM

## 2024-08-19 PROBLEM — G93.41 ACUTE METABOLIC ENCEPHALOPATHY: Status: RESOLVED | Noted: 2024-08-15 | Resolved: 2024-08-19

## 2024-08-19 PROBLEM — E87.1 HYPONATREMIA: Chronic | Status: RESOLVED | Noted: 2023-03-04 | Resolved: 2024-08-19

## 2024-08-19 PROBLEM — Z51.5 PALLIATIVE CARE ENCOUNTER: Status: ACTIVE | Noted: 2024-08-19

## 2024-08-19 PROBLEM — K72.90 DECOMPENSATED HEPATIC CIRRHOSIS: Status: ACTIVE | Noted: 2024-08-19

## 2024-08-19 PROBLEM — K74.60 DECOMPENSATED HEPATIC CIRRHOSIS: Status: ACTIVE | Noted: 2024-08-19

## 2024-08-19 PROBLEM — E87.6 HYPOKALEMIA: Status: RESOLVED | Noted: 2020-09-03 | Resolved: 2024-08-19

## 2024-08-19 LAB
ALBUMIN SERPL BCP-MCNC: 3.3 G/DL (ref 3.5–5.2)
ALP SERPL-CCNC: 243 U/L (ref 55–135)
ALT SERPL W/O P-5'-P-CCNC: 23 U/L (ref 10–44)
ANION GAP SERPL CALC-SCNC: 7 MMOL/L (ref 8–16)
ANISOCYTOSIS BLD QL SMEAR: SLIGHT
AST SERPL-CCNC: 42 U/L (ref 10–40)
BACTERIA SPEC AEROBE CULT: NO GROWTH
BASO STIPL BLD QL SMEAR: ABNORMAL
BASOPHILS # BLD AUTO: ABNORMAL K/UL (ref 0–0.2)
BASOPHILS NFR BLD: 1 % (ref 0–1.9)
BILIRUB SERPL-MCNC: 3.5 MG/DL (ref 0.1–1)
BUN SERPL-MCNC: 15 MG/DL (ref 6–20)
CALCIUM SERPL-MCNC: 8.9 MG/DL (ref 8.7–10.5)
CHLORIDE SERPL-SCNC: 110 MMOL/L (ref 95–110)
CO2 SERPL-SCNC: 20 MMOL/L (ref 23–29)
CREAT SERPL-MCNC: 1.6 MG/DL (ref 0.5–1.4)
DIFFERENTIAL METHOD BLD: ABNORMAL
EOSINOPHIL # BLD AUTO: ABNORMAL K/UL (ref 0–0.5)
EOSINOPHIL NFR BLD: 2 % (ref 0–8)
ERYTHROCYTE [DISTWIDTH] IN BLOOD BY AUTOMATED COUNT: 15.3 % (ref 11.5–14.5)
EST. GFR  (NO RACE VARIABLE): 37.6 ML/MIN/1.73 M^2
GLUCOSE SERPL-MCNC: 87 MG/DL (ref 70–110)
HCT VFR BLD AUTO: 21.6 % (ref 37–48.5)
HGB BLD-MCNC: 7.2 G/DL (ref 12–16)
IMM GRANULOCYTES # BLD AUTO: ABNORMAL K/UL (ref 0–0.04)
IMM GRANULOCYTES NFR BLD AUTO: ABNORMAL % (ref 0–0.5)
INR PPP: 1.6 (ref 0.8–1.2)
LYMPHOCYTES # BLD AUTO: ABNORMAL K/UL (ref 1–4.8)
LYMPHOCYTES NFR BLD: 18 % (ref 18–48)
MCH RBC QN AUTO: 29.4 PG (ref 27–31)
MCHC RBC AUTO-ENTMCNC: 33.3 G/DL (ref 32–36)
MCV RBC AUTO: 88 FL (ref 82–98)
MONOCYTES # BLD AUTO: ABNORMAL K/UL (ref 0.3–1)
MONOCYTES NFR BLD: 15 % (ref 4–15)
NEUTROPHILS NFR BLD: 64 % (ref 38–73)
NRBC BLD-RTO: 0 /100 WBC
PLATELET # BLD AUTO: 48 K/UL (ref 150–450)
PLATELET BLD QL SMEAR: ABNORMAL
PMV BLD AUTO: 11.9 FL (ref 9.2–12.9)
POLYCHROMASIA BLD QL SMEAR: ABNORMAL
POTASSIUM SERPL-SCNC: 3.7 MMOL/L (ref 3.5–5.1)
PROT SERPL-MCNC: 5.3 G/DL (ref 6–8.4)
PROTHROMBIN TIME: 17.2 SEC (ref 9–12.5)
RBC # BLD AUTO: 2.45 M/UL (ref 4–5.4)
SODIUM SERPL-SCNC: 137 MMOL/L (ref 136–145)
TACROLIMUS BLD-MCNC: 7.5 NG/ML (ref 5–15)
WBC # BLD AUTO: 1.97 K/UL (ref 3.9–12.7)

## 2024-08-19 PROCEDURE — 25000003 PHARM REV CODE 250

## 2024-08-19 PROCEDURE — 85007 BL SMEAR W/DIFF WBC COUNT: CPT

## 2024-08-19 PROCEDURE — 80197 ASSAY OF TACROLIMUS: CPT | Performed by: INTERNAL MEDICINE

## 2024-08-19 PROCEDURE — 99223 1ST HOSP IP/OBS HIGH 75: CPT | Mod: ,,, | Performed by: STUDENT IN AN ORGANIZED HEALTH CARE EDUCATION/TRAINING PROGRAM

## 2024-08-19 PROCEDURE — 80053 COMPREHEN METABOLIC PANEL: CPT

## 2024-08-19 PROCEDURE — 85027 COMPLETE CBC AUTOMATED: CPT

## 2024-08-19 PROCEDURE — 99497 ADVNCD CARE PLAN 30 MIN: CPT | Mod: 25,,, | Performed by: STUDENT IN AN ORGANIZED HEALTH CARE EDUCATION/TRAINING PROGRAM

## 2024-08-19 PROCEDURE — 99232 SBSQ HOSP IP/OBS MODERATE 35: CPT | Mod: ,,, | Performed by: INTERNAL MEDICINE

## 2024-08-19 PROCEDURE — 25000003 PHARM REV CODE 250: Performed by: INTERNAL MEDICINE

## 2024-08-19 PROCEDURE — A4216 STERILE WATER/SALINE, 10 ML: HCPCS

## 2024-08-19 PROCEDURE — 85610 PROTHROMBIN TIME: CPT

## 2024-08-19 PROCEDURE — 63600175 PHARM REV CODE 636 W HCPCS

## 2024-08-19 PROCEDURE — 94761 N-INVAS EAR/PLS OXIMETRY MLT: CPT

## 2024-08-19 RX ADMIN — VENLAFAXINE HYDROCHLORIDE 150 MG: 75 CAPSULE, EXTENDED RELEASE ORAL at 08:08

## 2024-08-19 RX ADMIN — Medication 10 ML: at 12:08

## 2024-08-19 RX ADMIN — LACTULOSE 30 G: 20 SOLUTION ORAL at 03:08

## 2024-08-19 RX ADMIN — OXYCODONE HYDROCHLORIDE 5 MG: 5 TABLET ORAL at 11:08

## 2024-08-19 RX ADMIN — RIFAXIMIN 550 MG: 550 TABLET ORAL at 08:08

## 2024-08-19 RX ADMIN — TACROLIMUS 0.5 MG: 0.5 CAPSULE ORAL at 08:08

## 2024-08-19 RX ADMIN — Medication 10 ML: at 05:08

## 2024-08-19 RX ADMIN — PANTOPRAZOLE SODIUM 40 MG: 40 TABLET, DELAYED RELEASE ORAL at 08:08

## 2024-08-19 RX ADMIN — LACTULOSE 30 G: 20 SOLUTION ORAL at 08:08

## 2024-08-19 RX ADMIN — Medication 10 ML: at 11:08

## 2024-08-19 RX ADMIN — CEFTRIAXONE 2 G: 2 INJECTION, POWDER, FOR SOLUTION INTRAMUSCULAR; INTRAVENOUS at 12:08

## 2024-08-19 RX ADMIN — LEVOTHYROXINE SODIUM 75 MCG: 75 TABLET ORAL at 07:08

## 2024-08-19 NOTE — ASSESSMENT & PLAN NOTE
54F with Von-Gierkes (glycogen storage) disease s/p OLT in 2002 now with cirrhosis of transplanted liver c/b HE, ascites, pancytopenia. Hepatology noted patient not eligible for repeat transplantation    -recommend continued clear and direct communication regarding prognosis and treatment options as a prerequisite for quality goals of care discussions

## 2024-08-19 NOTE — ASSESSMENT & PLAN NOTE
Creatinine stable at 1.6 on 8/19 day of discharge.   Creatinine stable at 1.7 and appears to be back to her baseline function on 8/18.   JACQUE improving on 8/17. Patient with known CKD stage 3a at baseline with baseline creatinine around 1.5. Creatinine 2.2 on admit and went to 2.4 on 8/16 but down to 1.7 on 8/17.   Nephrology consulted and appreciate recs and unlikely due to HRS with patient having elevated blood pressures. Suspect JACQUE related to pre-renal azotemia due to hypovolemia at this time.  Plan:  - Patient given 1 L Normal saline over 10 hrs and IV Albumin by Nephrology and appreciate recs.   - Urine studies consistent with prerenal azotemia.   - Monitor renal function with daily BMPs.   - Strict I&Os, close monitoring of UOP.  - Replace electrolytes PRN, goal K/Phos/Mg 4/3/2  [ ] Avoid nephrotoxic agents when feasible (NSAIDs, ACEi/ARB, IV radiocontrast, gadolinium, etc.)  [ ] Renally dose all meds to eGFR  [ ] Goal MAP > 65 mmHg  [ ] No acute indications for RRT at this time

## 2024-08-19 NOTE — ASSESSMENT & PLAN NOTE
Resolved on 8/18 after oral replacement on 8/17.   Patient's most recent potassium results are listed below.   Recent Labs     08/17/24  0933 08/18/24  0451 08/19/24  0501   K 3.4* 3.6 3.7       Plan  - Replete potassium per protocol  - Monitor potassium Daily  - Patient's hypokalemia is worsening. Will adjust treatment as follows: Will replace with oral potassium chloride 40 meq po x 1 dose on 8/17.

## 2024-08-19 NOTE — DISCHARGE SUMMARY
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 (on chronic immunosuppression with tacrolimus), cirrhosis of transplant liver with portal hypertension, trace ascites, hepatic encephalopathy, Arnold-Chiari malformation, migraines, history of craniotomy, 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     She is unable to provide a meaningful history because she is altered. No family at bedside. She reports that she had 1 episode of nausea and vomiting last night. Also c/o of abdominal pain and headache, the later of which she says is related to her low Na.     HDS, AF, Na 126, CO2 18, BUN 38, Cr 2.2, , Protein 5.8, Bili 3.9, Direct 2.8, AST 61, Alt 34, Ammonia 59, Serum Osm 282, Ua w/ trace LE, 1.010 speficic grav, U cr 55, U protein <7, U Na 12, U Osm 248. Head CT nothing acute, Liver US shows Mildly elevated intrahepatic arterial resistive indices, noting  improvement from 06/17/2024, which can be seen in transplant rejection or chronic liver disease. Unremarkable RP US.     Admitted to medicine for hyponatremia, JACQUE in a transplant pt.     She was recently hospitalized about 2-3 weeks ago:   Labs done on 7/25/2024 showed acute on chronic hyponatremia (sodium 125 mmol/L,), hyperkalemia (potassium 5.8 mmol/L), acute kidney injury (BUN 44 mg/dL and creatinine 5.2 mg/dL, [baseline 1.0], elevated ammonia (200 umol/L).hyponatermia and JACQUE.  Due to her history of liver transplant, her case was discussed with Hepatology at [facility]. Hepatology was consulted as was planned prior to transfer. She was put on normal saline at 100 mL/hr. Sodium improved to 129 mmol/L by 7/29/2024 and acute kidney injury improved. Hepatology recommended stopping IV fluids and resuming diuretics at the previous doses before they were increased. IV fluids were stopped. BUN and creatinine continued to improve but sodium decreased a little to 127. Interventional Radiology was unable to find a pocket of fluid for therapeutic paracentesis, a problem she had in the past. She was discharged home. Sodium was 129 at discharge. She was advised to hold furosemide and spironolactone until she gets outpatient labs and follows up with her hepatologist.     * No surgery found *      Hospital Course:   Patient admitted with symptomatic hyponatremia. Nephrology consulted and urine studies done and concern for hypovolemic hyponatremia so given IV Albumin infusion and 1 litre of normal saline. Sodium improved from 126 on admit to 130 on 8/16 to back to normal at 138 on 8/17 consistent with hypovolemic hyponatremia. Creatinien level also elevated at above baseline at 2.2 on admit and now back down to 1.7 on 8/17 after IV Albumin and normal saline and thus also consistent with hypovolemia causing JACQUE. Patient note dot have ascites on admit related to her cirrhosis of liver and IR consulted and  1.1 liters of ascitic fluid removed by IR on 8/16 without complications. Ascitic fluid only had 71 WBC so not consistent with SBP. SAAG 1.9 consistent with ascites related to portal HTN and consistent with her history of cirrhosis in transplanted liver. Hepatology consulted and also following for her liver transplant and cirrhosis and continuing with home immunosuppression in the hospital for her liver transplant. Hgb down to 7.0 on 8/17 after hydration and transfused 1 unit of PRBCs. Hgb improved to 7.3 on 8/18. Patient states she is feeling much better and still having some abdominal pain but improved from discharge. Hepatology recommending palliative care consult as patient not a candidate for future liver transplant and has had multiple recent admission to have goals of care discussion with patient I did speak with patient about what Hepatology stated and she was surprised as unaware she is not a liver transplant candidate for the future. Patient states she is not ready to make any life changing decisions at this time but I told her she did not have to make any decisions immediately but would be good to sit and talk to palliative care to see what her future options are and what goals of care she would want to make in the future as her liver disease is to likely progress and lead to further complications and likely hospitalizations and have impact on her quality of life. Creatinine stable at 1.7 on 8/18. Palliative care did met with patient and discussed goals of care with patient. As noted patient nt ready to make any end of life care decisions at this time but discussion started. Creatinine stable at 1.6 on dy of discharge. Patient discharged home in good condition on 8/19. Hyponatremia resolved on discharge.      Goals of Care Treatment Preferences:  Code Status: Full Code          What is most important right now is to focus on remaining as independent as possible.  Accordingly, we have decided that the best  plan to meet the patient's goals includes continuing with treatment.      SDOH Screening:  The patient was screened for utility difficulties, food insecurity, transport difficulties, housing insecurity, and interpersonal safety and there were no concerns identified this admission.     Consults:   Consults (From admission, onward)          Status Ordering Provider     Inpatient consult to Palliative Care  Once        Provider:  (Not yet assigned)    Completed SHEREE العراقي     Inpatient consult to Nephrology  Once        Provider:  (Not yet assigned)    Completed TREVIN SIMMONS     Inpatient consult to Midline team  Once        Provider:  (Not yet assigned)    Completed TREVIN SIMMONS     Inpatient consult to Hepatology  Once        Provider:  (Not yet assigned)    Completed TREVIN SIMMONS            Neuro  Acute metabolic encephalopathy-resolved as of 8/19/2024  Confusion resolved on 8/18 so suspect hyponatremia played a role in her encephalopathy on admit.    Confusion improving on 8/17 with correction of sodium level.   Patient with confusion on admit and suspect related to metabolic cause and likely related to hyponatremia.   Doubt infectious as no source. Paracentesis done on 8/16 and no evidence of SBP. U/A on admit with no infection  CT scan of head on admit unremarkable.  Patient not hypoxic.  Ammonia mildly elevated at 59 on admit and on Lactulose and Rifaximin to treat and maybe contributing to AMS.       Psychiatric  Anxiety  Chronic and controlled. Avoid benzos in light of her confusion. Continue home Effexor to treat. Discontinue Clonazepam she was on at home on discharge.       Renal/  Acute kidney injury superimposed on CKD  Creatinine stable at 1.6 on 8/19 day of discharge.   Creatinine stable at 1.7 and appears to be back to her baseline function on 8/18.   JACQUE improving on 8/17. Patient with known CKD stage 3a at baseline with baseline creatinine around 1.5. Creatinine 2.2 on admit and  went to 2.4 on 8/16 but down to 1.7 on 8/17.   Nephrology consulted and appreciate recs and unlikely due to HRS with patient having elevated blood pressures. Suspect JACQUE related to pre-renal azotemia due to hypovolemia at this time.  Plan:  - Patient given 1 L Normal saline over 10 hrs and IV Albumin by Nephrology and appreciate recs.   - Urine studies consistent with prerenal azotemia.   - Monitor renal function with daily BMPs.   - Strict I&Os, close monitoring of UOP.  - Replace electrolytes PRN, goal K/Phos/Mg 4/3/2  [ ] Avoid nephrotoxic agents when feasible (NSAIDs, ACEi/ARB, IV radiocontrast, gadolinium, etc.)  [ ] Renally dose all meds to eGFR  [ ] Goal MAP > 65 mmHg  [ ] No acute indications for RRT at this time      Hypokalemia-resolved as of 8/19/2024  Resolved on 8/18 after oral replacement on 8/17.   Patient's most recent potassium results are listed below.   Recent Labs     08/17/24  0933 08/18/24  0451 08/19/24  0501   K 3.4* 3.6 3.7       Plan  - Replete potassium per protocol  - Monitor potassium Daily  - Patient's hypokalemia is worsening. Will adjust treatment as follows: Will replace with oral potassium chloride 40 meq po x 1 dose on 8/17.      Oncology  Pancytopenia  Stable. Chronic condition. Patient with known pancytopenia and likely combination of liver cirrhosis and chronic immunosuppression.   Patient with known anemia, leukopenia and thrombocytopenia and blood counts at baseline.      Iron deficiency anemia secondary to inadequate dietary iron intake  - Hgb stable at 7.2 on day of discharge.   - Improved Hgb 7.3 on 8/18 after transfused 1 unit of PRBCs on 8/17 for Hgb 7.   - Anemia is likely due to chronic blood loss resulting in iron deficiency anemia and chronic disease due to Chronic liver disease and CKD. Hgb 8.2 on admit and close to baseline level but down to 7 on 8/17. Likely combination of her chronic anemia and hemodilution as has received IVF's and IV Albumin in hospital for  hyponatremia and JACQUE. No clinical signs of active bleeding. Most recent hemoglobin and hematocrit are listed below.  Recent Labs     08/17/24  0440 08/18/24  0451 08/19/24  0501   HGB 7.0* 7.3* 7.2*   HCT 20.7* 21.7* 21.6*       Plan  - Transfuse PRBC if patient becomes hemodynamically unstable, symptomatic or H/H drops below 7/21.  - Patient has not received any PRBC transfusions to date  - Patient's anemia is currently stable on discharge.       Endocrine  * Hyponatremia-resolved as of 8/19/2024  Sodium stable at 137 on 8/18 and resolved on discharge.   Improved on 8/17. Sodium back to normal at 138 on 8/17 after given IVF's and IV Albumin.  Hyponatremia is likely due to hypovolemia as responded to 1 liter of normal saline and IV albumin for volume expansion. The patient's most recent sodium results are listed below.  Recent Labs     08/17/24  0933 08/18/24  0451 08/19/24  0501    137 137         Plan  Appears Euvolemic on exam currently so no further volume expansion at this time. Continue to hold home diuretics (Lasix).   Urine studies: U Cr 55, U Protein <7, Na 12, Osm 248  Nephrology consulted and given IV Albumin and 1 liter of normal saline as per recs. and appreciate recs and assistance on case.     Acquired hypothyroidism  Chronic and controlled. Continue home Levothyroxine to treat on discharge.       SIADH (syndrome of inappropriate ADH production)  Noted on chart review to have prior history of SIADH but likely not contributing to current picture as Urine Na low, and Serum and urine Osm normal. Doubt current cause of hyponatremia as sodium corrected with volume expansion.       GI  Chronic rejection of liver transplant  MELD 3.0: 23 at 8/19/2024  5:01 AM  MELD-Na: 21 at 8/19/2024  5:01 AM  Calculated from:  Serum Creatinine: 1.6 mg/dL at 8/19/2024  5:01 AM  Serum Sodium: 137 mmol/L at 8/19/2024  5:01 AM  Total Bilirubin: 3.5 mg/dL at 8/19/2024  5:01 AM  Serum Albumin: 3.3 g/dL at 8/19/2024  5:01  AM  INR(ratio): 1.6 at 2024  5:01 AM  Age at listin years  Sex: Female at 2024  5:01 AM    Plan:  Daily liver labs (CMP, Coags) to monitor synthetic function.   Continue home Lactulose (titrate to 3-4 BMs a day) and Rifaximin to treat known hepatic encephalopathy. Ammonia 59 on admit.   Hepatology consulted and following and appreciate assistance.   Patient not a candidate for repeat transplant due to multiple co morbid conditions and repeated abdominal surgeries. Unfortunately, there's no further treatment options for her. They recommend palliative medicine for her. I spoke with patient about this issue on  and palliative care consulted but she was unaware she was not a candidate for future liver transplant but I discussed this with her on  and recommended to start thinking about her goals of care for her future due to her advanced liver disease and having recent complications related to her liver cirrhosis. Patient met with palliative and discussion made about end of life but as noted she is not currently ready to make any decisions.       S/P liver transplant 2002 for von Gierke disease  Patient with known liver transplant in 2002. Hepatology consulted to assist in immunosuppressive medicine management and plan to continue home Tacrolimus to treat on discharge.  Appreciate Hepatology assistance on case.       Palliative Care  Long-term use of immunosuppressant medication  Patient on chronic Immunosurpression for liver transplant. Hepatology managing immunosuppression in hospital. Continue home Tacrolimus po BID on discharge.       Final Active Diagnoses:    Diagnosis Date Noted POA    Acute kidney injury superimposed on CKD [N17.9, N18.9] 2020 Yes    Iron deficiency anemia secondary to inadequate dietary iron intake [D50.8] 10/20/2013 Yes    Long-term use of immunosuppressant medication [Z79.60]  Not Applicable     Chronic    Chronic rejection of liver transplant [T86.41]  05/18/2015 Yes     Chronic    S/P liver transplant 9/23/2002 for von Gierke disease [Z94.4] 10/12/2013 Not Applicable     Chronic    Pancytopenia [D61.818] 08/16/2023 Yes     Chronic    SIADH (syndrome of inappropriate ADH production) [E22.2] 10/12/2013 Yes     Chronic    Acquired hypothyroidism [E03.9] 10/12/2013 Yes    Anxiety [F41.9] 10/12/2013 Yes     Chronic    Palliative care encounter [Z51.5] 08/19/2024 Not Applicable    Decompensated hepatic cirrhosis [K72.90, K74.60] 08/19/2024 Yes      Problems Resolved During this Admission:    Diagnosis Date Noted Date Resolved POA    PRINCIPAL PROBLEM:  Hyponatremia [E87.1] 03/04/2023 08/19/2024 Yes     Chronic    Hypokalemia [E87.6] 09/03/2020 08/19/2024 Yes    Acute metabolic encephalopathy [G93.41] 08/15/2024 08/19/2024 Yes    Abdominal pain [R10.9] 10/19/2013 08/17/2024 Yes       Discharged Condition: good    Disposition: Home or Self Care    Follow Up:  Future Appointments   Date Time Provider Department Center   8/21/2024  3:20 PM David Lorenzo MD [facility]   9/9/2024 11:00 AM JONATHAN OIC-US1 MASTER [facility] ULTR [facility]   9/13/2024  8:20 AM Dave Upton MD [facility] CARDIO1 [address]   9/16/2024  2:20 PM Kaitlyn Goel MD [facility] OBGYN [place]   10/1/2024  3:00 PM Jaya Nielsen MD [facility] HEPAT [address]   10/14/2024  4:30 PM SMEH MAMMO1 [facility] MAMMO [facility]   10/15/2024  9:45 AM Juan M Woodard, ANA SM2C OPTOMTY [facility]        Follow-up Information       David Lorenzo MD Follow up.    Specialties: Internal Medicine, Family Medicine  Contact information:  [address] [place] [address]  [phone]                           Patient Instructions:      Diet Adult Regular     Order Specific Question Answer Comments   Na restriction, if any: 2gNa    Fluid restriction: Fluid - 1500mL      Notify your health care provider if you experience any of the following:  temperature >100.4     Notify your health care provider if you experience any  of the following:  persistent nausea and vomiting or diarrhea     Notify your health care provider if you experience any of the following:  severe uncontrolled pain     Notify your health care provider if you experience any of the following:  redness, tenderness, or signs of infection (pain, swelling, redness, odor or green/yellow discharge around incision site)     Notify your health care provider if you experience any of the following:  difficulty breathing or increased cough     Notify your health care provider if you experience any of the following:  severe persistent headache     Notify your health care provider if you experience any of the following:  worsening rash     Notify your health care provider if you experience any of the following:  persistent dizziness, light-headedness, or visual disturbances     Notify your health care provider if you experience any of the following:  increased confusion or weakness     Activity as tolerated       Significant Diagnostic Studies: Labs: CMP   Recent Labs   Lab 08/18/24  0451 08/19/24  0501    137   K 3.6 3.7    110   CO2 22* 20*   GLU 92 87   BUN 19 15   CREATININE 1.7* 1.6*   CALCIUM 9.3 8.9   PROT 5.7* 5.3*   ALBUMIN 3.7 3.3*   BILITOT 4.8* 3.5*   ALKPHOS 249* 243*   AST 39 42*   ALT 22 23   ANIONGAP 6* 7*    and CBC   Recent Labs   Lab 08/18/24  0451 08/19/24  0501   WBC 2.27* 1.97*   HGB 7.3* 7.2*   HCT 21.7* 21.6*   PLT 44* 48*     Sodium   Date Value Ref Range Status   08/19/2024 137 136 - 145 mmol/L Final   08/18/2024 137 136 - 145 mmol/L Final   08/17/2024 138 136 - 145 mmol/L Final   08/17/2024 137 136 - 145 mmol/L Final   08/16/2024 130 (L) 136 - 145 mmol/L Final   08/16/2024 130 (L) 136 - 145 mmol/L Final   08/16/2024 126 (L) 136 - 145 mmol/L Final   08/15/2024 126 (L) 136 - 145 mmol/L Final   08/10/2024 126 (L) 136 - 145 mmol/L Final   08/03/2024 132 (L) 136 - 145 mmol/L Final   07/31/2024 129 (L) 136 - 145 mmol/L Final   07/30/2024 131 (L)  136 - 145 mmol/L Final   07/30/2024 127 (L) 136 - 145 mmol/L Final   07/29/2024 129 (L) 136 - 145 mmol/L Final   07/28/2024 122 (LL) 136 - 145 mmol/L Final     Comment:     Critical result  mmol/L called to and read back by Felicia Felder   RN ER  at 28-Jul-2024 02:52 [facility]  Result Rechecked       Pending Diagnostic Studies:       None           Medications:  Reconciled Home Medications:      Medication List        START taking these medications      furosemide 40 MG tablet  Commonly known as: LASIX  Take 0.5 tablets (20 mg total) by mouth once daily.             CHANGE how you take these medications      CONSTULOSE 10 gram/15 mL solution  Generic drug: lactulose  Take 15 mLs (10 g total) by mouth 3 (three) times daily.            CONTINUE taking these medications      diphenhydrAMINE 25 mg capsule  Commonly known as: BENADRYL  Take 25 mg by mouth daily as needed for Allergies.     levothyroxine 75 MCG tablet  Commonly known as: SYNTHROID  Take 1 tablet (75 mcg total) by mouth once daily.     magnesium oxide 400 mg (241.3 mg magnesium) tablet  Commonly known as: MAG-OX  TAKE 1 TABLET(400 MG) BY MOUTH TWICE DAILY     ONE DAILY MULTIVITAMIN per tablet  Generic drug: multivitamin  Take 1 tablet by mouth once daily.     pantoprazole 20 MG tablet  Commonly known as: PROTONIX  Take 20 mg by mouth once daily.     pravastatin 20 MG tablet  Commonly known as: PRAVACHOL  Take 1 tablet (20 mg total) by mouth once daily. Need OFFICE VISIT before next refill, last seen 9/2023. This will be the LAST Rx if visit is not made.     tacrolimus 0.5 MG Cap  Commonly known as: PROGRAF  Take 1 capsule (0.5 mg total) by mouth every 12 (twelve) hours.     venlafaxine 150 MG Cp24  Commonly known as: EFFEXOR-XR  Take 1 capsule (150 mg total) by mouth once daily.            STOP taking these medications      clonazePAM 0.5 MG tablet  Commonly known as: KlonoPIN              Indwelling Lines/Drains at time of discharge:    Lines/Drains/Airways       None                   31 minutes of time spent on discharge, including examining the patient, providing discharge instructions, arranging follow-up and documentation.         Jeanine Jiménez MD  Department of Hospital Medicine  [facility]

## 2024-08-19 NOTE — SUBJECTIVE & OBJECTIVE
Interval History: Chart reviewed including 24h medication use. Patient resting comfortably in bed, awake and conversant. 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    Arnold-Chiari malformation     Bacteremia 12/22/2023    Depression     Esophageal stricture     Essential tremor     Hypertension     Left bundle branch block     Liver fibrosis, transplanted liver 10/02/2018    Suggested on fibroscan 10/2/18    Migraine without aura     MVP (mitral valve prolapse)     Non-rheumatic mitral regurgitation 10/01/2018    Non-rheumatic tricuspid valve insufficiency 10/01/2018    Osteoporosis     Perforated abdominal viscus 09/04/2020    Recurrent urinary tract infection     Seizures     Shingles 2007    SIADH (syndrome of inappropriate ADH production)     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    Tricuspid valve prolapse     Urolithiasis     Von Gierke disease     s/p liver transplant       Past Surgical History:   Procedure Laterality Date    APPENDECTOMY  6/22/2007    APPLICATION OF WOUND VACUUM-ASSISTED CLOSURE DEVICE N/A 9/18/2020    Procedure: APPLICATION, WOUND VAC;  Surgeon: Zain Decker MD;  Location: [facility] OR [address] [place] [address];  Service: General;  Laterality: N/A;    COLONOSCOPY  5/13/2008    internal hemorrhoids    CRANIOTOMY      ESOPHAGOGASTRODUODENOSCOPY N/A 9/3/2020    Procedure: EGD (ESOPHAGOGASTRODUODENOSCOPY);  Surgeon: Tyrel Vergara MD;  Location: [facility];  Service: Endoscopy;  Laterality: N/A;    ESOPHAGOGASTRODUODENOSCOPY N/A 12/22/2023    Procedure: EGD (ESOPHAGOGASTRODUODENOSCOPY);  Surgeon: Jhony James MD;  Location: [facility] ([address] [place] [address]);  Service: Endoscopy;  Laterality: N/A;    EXPLORATORY  LAPAROTOMY  2020    due to perforated stomach    LAMINECTOMY  3/2001    LIVER TRANSPLANT  9/23/2002    OSSICULAR RECONSTRUCTION  10/4/1995    RIGHT REPLACEMENT PROSTHESIS for cholesteatoma    THYMECTOMY  5/2/2007    TONSILLECTOMY, ADENOIDECTOMY  1/21/2004    TOTAL ABDOMINAL HYSTERECTOMY  3/31/1994       Review of patient's allergies indicates:   Allergen Reactions    Codeine Itching     Other reaction(s): Itching    Lipitor [atorvastatin] Other (See Comments)     Other reaction(s): Muscle pain  Muscle cranmps    Morphine Itching     Other reaction(s): nausea and vomiting     Zoloft [sertraline] Other (See Comments)     Tremors/muscle spasms       Medications:  Continuous Infusions:  Scheduled Meds:   cefTRIAXone (Rocephin) IV (PEDS and ADULTS)  2 g Intravenous Q24H    lactulose  30 g Oral TID    levothyroxine  75 mcg Oral Daily    pantoprazole  40 mg Oral Daily    rifAXImin  550 mg Oral BID    sodium chloride 0.9%  10 mL Intravenous Q6H    tacrolimus  0.5 mg Oral BID    venlafaxine  150 mg Oral Daily     PRN Meds:  Current Facility-Administered Medications:     0.9%  NaCl infusion (for blood administration), , Intravenous, Q24H PRN    dextrose 10%, 12.5 g, Intravenous, PRN    dextrose 10%, 25 g, Intravenous, PRN    glucagon (human recombinant), 1 mg, Intramuscular, PRN    glucose, 16 g, Oral, PRN    glucose, 24 g, Oral, PRN    melatonin, 6 mg, Oral, Nightly PRN    naloxone, 0.02 mg, Intravenous, PRN    ondansetron, 4 mg, Intravenous, Q6H PRN    oxyCODONE, 5 mg, Oral, Q6H PRN    prochlorperazine, 5 mg, Intravenous, Q6H PRN    sodium chloride 0.9%, 10 mL, Intravenous, PRN    sodium chloride 0.9%, 10 mL, Intravenous, Q12H PRN    Flushing PICC/Midline Protocol, , , Until Discontinued **AND** sodium chloride 0.9%, 10 mL, Intravenous, Q6H **AND** sodium chloride 0.9%, 10 mL, Intravenous, PRN    Family History       Problem Relation (Age of Onset)    Heart disease Mother    No Known Problems Father    Stroke Mother       "    Tobacco Use    Smoking status: Never    Smokeless tobacco: Never   Substance and Sexual Activity    Alcohol use: No    Drug use: No    Sexual activity: Not on file         Objective:     Vital Signs (Most Recent):  Temp: 98.3 °F (36.8 °C) (08/19/24 1127)  Pulse: 73 (08/19/24 1127)  Resp: 18 (08/19/24 1133)  BP: (!) 158/70 (08/19/24 1127)  SpO2: 97 % (08/19/24 1127) Vital Signs (24h Range):  Temp:  [97.2 °F (36.2 °C)-98.3 °F (36.8 °C)] 98.3 °F (36.8 °C)  Pulse:  [67-76] 73  Resp:  [17-18] 18  SpO2:  [95 %-98 %] 97 %  BP: (124-168)/(64-76) 158/70     Weight: 61.5 kg (135 lb 9.3 oz)  Body mass index is 27.38 kg/m².       Physical Exam  Vitals and nursing note reviewed.   Constitutional:       General: She is not in acute distress.     Appearance: She is ill-appearing. She is not toxic-appearing.      Comments: Chronically ill-appearing female lying in bed, awake and conversant, no observed pain behaviors   HENT:      Mouth/Throat:      Mouth: Mucous membranes are moist.   Eyes:      Extraocular Movements: Extraocular movements intact.   Pulmonary:      Effort: Pulmonary effort is normal. No respiratory distress.   Abdominal:      General: Abdomen is flat.      Palpations: Abdomen is soft.   Skin:     General: Skin is warm and dry.   Neurological:      General: No focal deficit present.      Mental Status: She is alert and oriented to person, place, and time.   Psychiatric:      Comments: Tearful at times              Advance Care Planning   Advance Directives:     Decision Making:  Patient answered questions  Goals of Care: I engaged the patient in a voluntary discussion regarding her wishes and values in the setting of severe, life-limiting illness and end stage liver disease without possibility of transplant. Patient with some insight and prognostic awareness, sharing that she understands that she is not being offered re-transplant. When asked what she knows about the prognosis of ESLD, she shares "I know it " "sucks". I affirmed this and acknowledged this change in perspective. With her prompting, I shared that most patients with ESLD like hers live on the order of months to short years. She adds "they told me I was going to die 3 years ago". I this to acknowledge the considerable variability and uncertainty that comes with ESLD. I also asked if she had ever considered her values and care wishes if she were ever so ill that we thought she was at the end of her life. She is unsure what her care preferences would be (location, focus of care, life support/procedures) but is clear that she would worry most about her adopted children (13 and 21). She believes that they would be at the center of any decision she makes. "I want to see them grow up". For now, she does not want to place any limits on her future care and would consider increasingly frequent hospitalizations acceptable. I shared that we would honor these wishes but continue to be honest about her trajectory and prognosis. I also added that, at any point, we can consider limits we might place on her care, specifically when thinking about what interventions and care will add to her quality of life. All other questions and concerns addressed at this time           CBC:   Recent Labs   Lab 08/19/24  0501   WBC 1.97*   HGB 7.2*   HCT 21.6*   MCV 88   PLT 48*     BMP:  Recent Labs   Lab 08/19/24  0501   GLU 87      K 3.7      CO2 20*   BUN 15   CREATININE 1.6*   CALCIUM 8.9     LFT:  Lab Results   Component Value Date    AST 42 (H) 08/19/2024     (H) 04/07/2018    ALKPHOS 243 (H) 08/19/2024    BILITOT 3.5 (H) 08/19/2024     Albumin:   Albumin   Date Value Ref Range Status   08/19/2024 3.3 (L) 3.5 - 5.2 g/dL Final     Protein:   Total Protein   Date Value Ref Range Status   08/19/2024 5.3 (L) 6.0 - 8.4 g/dL Final     Lactic acid:   Lab Results   Component Value Date    LACTATE 1.0 02/28/2023    LACTATE 1.9 09/18/2020     Reviewed CBC with pancytopenia, " CMP with persistent/improving JACQUE. Tbili elevated consistent with ESLD

## 2024-08-19 NOTE — ASSESSMENT & PLAN NOTE
See ACP 8/19    Insight/goals of care- fair insight and prognostic awareness, clear goals of life prolongation at any cost. Would be open to continued conversations as quality of life declines. Values time with her adopted children. Does not want any limits on her care at this time. NOK would be , Kody    Social support- supportive  Kody. 2 adopted children, 13 and 21    Psychological- limited coping mechanisms, appropriate grief    Spiritual- not a part of a kalyan community of belief system, might be open to spiritual care visits in the future    Symptom management- abdominal pain    Recommendations  -continue current level of care  -continue oxycodone 5mg q6hr PRN for pain   -q6 or q8 intervals likely appropriate given extended half life in ESLD patients

## 2024-08-19 NOTE — ASSESSMENT & PLAN NOTE
Stable. Chronic condition. Patient with known pancytopenia and likely combination of liver cirrhosis and chronic immunosuppression.   Patient with known anemia, leukopenia and thrombocytopenia and blood counts at baseline.

## 2024-08-19 NOTE — ASSESSMENT & PLAN NOTE
MELD 3.0: 23 at 2024  5:01 AM  MELD-Na: 21 at 2024  5:01 AM  Calculated from:  Serum Creatinine: 1.6 mg/dL at 2024  5:01 AM  Serum Sodium: 137 mmol/L at 2024  5:01 AM  Total Bilirubin: 3.5 mg/dL at 2024  5:01 AM  Serum Albumin: 3.3 g/dL at 2024  5:01 AM  INR(ratio): 1.6 at 2024  5:01 AM  Age at listin years  Sex: Female at 2024  5:01 AM    Plan:  Daily liver labs (CMP, Coags) to monitor synthetic function.   Continue home Lactulose (titrate to 3-4 BMs a day) and Rifaximin to treat known hepatic encephalopathy. Ammonia 59 on admit.   Hepatology consulted and following and appreciate assistance.   Patient not a candidate for repeat transplant due to multiple co morbid conditions and repeated abdominal surgeries. Unfortunately, there's no further treatment options for her. They recommend palliative medicine for her. I spoke with patient about this issue on  and palliative care consulted but she was unaware she was not a candidate for future liver transplant but I discussed this with her on  and recommended to start thinking about her goals of care for her future due to her advanced liver disease and having recent complications related to her liver cirrhosis. Patient met with palliative and discussion made about end of life but as noted she is not currently ready to make any decisions.

## 2024-08-19 NOTE — ASSESSMENT & PLAN NOTE
Patient with known liver transplant in 9/2002. Hepatology consulted to assist in immunosuppressive medicine management and plan to continue home Tacrolimus to treat on discharge.  Appreciate Hepatology assistance on case.

## 2024-08-19 NOTE — PLAN OF CARE
08/19/24 1452   Final Note   Assessment Type Final Discharge Note   Anticipated Discharge Disposition Home   Hospital Resources/Appts/Education Provided Provided patient/caregiver with written discharge plan information   Post-Acute Status   Other Status No Post-Acute Service Needs   Patient choice form signed by patient/caregiver List with quality metrics by geographic area provided   Discharge Delays None known at this time     [facility] - Med Surg  Discharge Final Note    Primary Care Provider: David Lorenzo MD    Expected Discharge Date: 8/19/2024    Pt d/c home with family. No d/c needs reported by medical team at this time.   Patient cleared for discharge from case management standpoint.     Discharge Plan A and Plan B have been determined by review of patient's clinical status, future medical and therapeutic needs, and coverage/benefits for post-acute care in coordination with multidisciplinary team members.        Final Discharge Note (most recent)       Final Note - 08/19/24 1452          Final Note    Assessment Type Final Discharge Note (P)      Anticipated Discharge Disposition Home or Self Care (P)      Hospital Resources/Appts/Education Provided Provided patient/caregiver with written discharge plan information (P)         Post-Acute Status    Other Status No Post-Acute Service Needs (P)      Patient choice form signed by patient/caregiver List with quality metrics by geographic area provided (P)      Discharge Delays None known at this time (P)                      Important Message from Medicare  Important Message from Medicare regarding Discharge Appeal Rights: Explained to patient/caregiver, Given to patient/caregiver     Date IMM was signed: 08/19/24  Time IMM was signed: 1112    Contact Info       David Lorenzo MD   Specialty: Internal Medicine, Family Medicine   Relationship: PCP - General    [address] [place] [address]   Phone: [phone]       Next Steps: Follow up             Future Appointments   Date Time Provider Department Center   8/21/2024  3:20 PM David Lorenzo MD [facility] MED [place]   9/9/2024 11:00 AM JONATHAN OIC-US1 MASTER [facility] ULTR [facility]   9/13/2024  8:20 AM Dave Upton MD [facility]ARD[facility]   9/16/2024  2:20 PM Kaitlyn Goel MD [facility] OBGYN [facility]   10/1/2024  3:00 PM Jaya Nielsen MD [facility] HEPAT [facility]        scheduled post-discharge follow-up appointment and information added to AVS.     Lizeth Grace, MSW  [facility]  Ext. [phone]

## 2024-08-19 NOTE — PROGRESS NOTES
[facility] - [facility] Surg  Nephrology  Progress Note    Patient Name: Elda Hernandez  MRN: [id number]  Admission Date: 8/15/2024  Hospital Length of Stay: 3 days  Attending Provider: Jeanine Jiménez MD   Primary Care Physician: David Lorenzo MD  Principal Problem:Hyponatremia    Subjective:     HPI: 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 2002 (on chronic immunosuppression with tacrolimus), cirrhosis of transplant liver with portal hypertension, trace ascites, hepatic encephalopathy, Arnold-Chiari malformation, migraines, history of craniotomy, possible SIADH with chronic hyponatremia, hypertension, left bundle branch block, chronic kidney disease stage 3 (baseline Cr 1-1.3), and kidney angiolipoma.  She reports that she had 1 episode of nausea and vomiting last night. Also c/o of abdominal pain and headache, the later of which she says is related to her low Na.      In ED, HDS, AF, Na 126, CO2 18, BUN 38, Cr 2.2, , Protein 5.8, Bili 3.9, Direct 2.8, AST 61, Alt 34, Ammonia 59, Serum Osm 282, Ua w/ trace LE, 1.010 speficic grav, U cr 55, U protein <7, U Na 12, U Osm 248. Head CT nothing acute, Liver US shows Mildly elevated intrahepatic arterial resistive indices, noting improvement from 06/17/2024, which can be seen in transplant rejection or chronic liver disease. Unremarkable RP US.     Nephrology consulted for concern for JACQUE and hyponatremia    Interval History: NAEON. Afebrile, VSS on RA. Cr down to 1.6 (baseline ~1.5)    Review of patient's allergies indicates:   Allergen Reactions    Codeine Itching     Other reaction(s): Itching    Lipitor [atorvastatin] Other (See Comments)     Other reaction(s): Muscle pain  Muscle cranmps    Morphine Itching     Other reaction(s): nausea and vomiting     Zoloft [sertraline] Other (See Comments)     Tremors/muscle spasms     Current Facility-Administered Medications   Medication Frequency    0.9%  NaCl  infusion (for blood administration) Q24H PRN    cefTRIAXone (ROCEPHIN) 2 g in D5W 100 mL IVPB (MB+) Q24H    dextrose 10% bolus 125 mL 125 mL PRN    dextrose 10% bolus 250 mL 250 mL PRN    glucagon (human recombinant) injection 1 mg PRN    glucose chewable tablet 16 g PRN    glucose chewable tablet 24 g PRN    lactulose 20 gram/30 mL solution Soln 30 g TID    levothyroxine tablet 75 mcg Daily    melatonin tablet 6 mg Nightly PRN    naloxone 0.4 mg/mL injection 0.02 mg PRN    ondansetron injection 4 mg Q6H PRN    oxyCODONE immediate release tablet 5 mg Q6H PRN    pantoprazole EC tablet 40 mg Daily    prochlorperazine injection Soln 5 mg Q6H PRN    rifAXIMin tablet 550 mg BID    sodium chloride 0.9% flush 10 mL PRN    sodium chloride 0.9% flush 10 mL Q12H PRN    sodium chloride 0.9% flush 10 mL Q6H    And    sodium chloride 0.9% flush 10 mL PRN    tacrolimus capsule 0.5 mg BID    venlafaxine 24 hr capsule 150 mg Daily       Objective:     Vital Signs (Most Recent):  Temp: 98.3 °F (36.8 °C) (08/19/24 1127)  Pulse: 73 (08/19/24 1127)  Resp: 18 (08/19/24 1133)  BP: (!) 158/70 (08/19/24 1127)  SpO2: 97 % (08/19/24 1127) Vital Signs (24h Range):  Temp:  [97.2 °F (36.2 °C)-98.3 °F (36.8 °C)] 98.3 °F (36.8 °C)  Pulse:  [67-76] 73  Resp:  [17-18] 18  SpO2:  [95 %-98 %] 97 %  BP: (124-168)/(64-76) 158/70     Weight: 61.5 kg (135 lb 9.3 oz) (08/18/24 0400)  Body mass index is 27.38 kg/m².  Body surface area is 1.6 meters squared.    I/O last 3 completed shifts:  In: 240 [P.O.:240]  Out: 200 [Urine:200]     Physical Exam     Significant Labs:  All labs within the past 24 hours have been reviewed.     Significant Imaging:  Labs: Reviewed  Assessment/Plan:     Renal/  Acute kidney injury superimposed on CKD  Baseline Cr 1-1.3, Cr 2.2 on admission  Hx of CKD3  US Retro without hydronephrosis  Unlikely d/t HRS with patient having elevated blood pressures   Suspect pre-renal at this time due to low volume      Recommendation:   [ ]  Would hold off on re-initiating diuretics  [ ] Free water restriction 1.5L    [ ] F/u w/local nephrologist  [ ] Trend Cr/ Serial BMPs  [ ] Strict I&Os, close monitoring of UOP  [ ] Replace electrolytes PRN, goal K/Phos/Mg 4/3/2  [ ] Avoid nephrotoxic agents when feasible (NSAIDs, ACEi/ARB, IV radiocontrast, gadolinium, etc.)  [ ] Renally dose all meds to eGFR  [ ] Goal MAP > 65 mmHg  [ ] No acute indications for RRT at this time          Thank you for your consult. I will sign-off on patient. Please contact us if you have any additional questions.    Mawadah Samad, MD  Nephrology  [facility] Surg

## 2024-08-19 NOTE — ASSESSMENT & PLAN NOTE
Sodium stable at 137 on 8/18 and resolved on discharge.   Improved on 8/17. Sodium back to normal at 138 on 8/17 after given IVF's and IV Albumin.  Hyponatremia is likely due to hypovolemia as responded to 1 liter of normal saline and IV albumin for volume expansion. The patient's most recent sodium results are listed below.  Recent Labs     08/17/24  0933 08/18/24  0451 08/19/24  0501    137 137         Plan  Appears Euvolemic on exam currently so no further volume expansion at this time. Continue to hold home diuretics (Lasix).   Urine studies: U Cr 55, U Protein <7, Na 12, Osm 248  Nephrology consulted and given IV Albumin and 1 liter of normal saline as per recs. and appreciate recs and assistance on case.

## 2024-08-19 NOTE — ASSESSMENT & PLAN NOTE
Baseline Cr 1-1.3, Cr 2.2 on admission  Hx of CKD3  US Retro without hydronephrosis  Unlikely d/t HRS with patient having elevated blood pressures   Suspect pre-renal at this time due to low volume      Recommendation:   [ ] Would hold off on re-initiating diuretics  [ ] Free water restriction 1.5L    [ ] F/u w/local nephrologist  [ ] Trend Cr/ Serial BMPs  [ ] Strict I&Os, close monitoring of UOP  [ ] Replace electrolytes PRN, goal K/Phos/Mg 4/3/2  [ ] Avoid nephrotoxic agents when feasible (NSAIDs, ACEi/ARB, IV radiocontrast, gadolinium, etc.)  [ ] Renally dose all meds to eGFR  [ ] Goal MAP > 65 mmHg  [ ] No acute indications for RRT at this time

## 2024-08-19 NOTE — PLAN OF CARE
Problem: Adult Inpatient Plan of Care  Goal: Plan of Care Review  Outcome: Met  Goal: Patient-Specific Goal (Individualized)  Outcome: Met  Goal: Absence of Hospital-Acquired Illness or Injury  Outcome: Met  Goal: Optimal Comfort and Wellbeing  Outcome: Met  Goal: Readiness for Transition of Care  Outcome: Met     Problem: Infection  Goal: Absence of Infection Signs and Symptoms  Outcome: Met     Problem: Fall Injury Risk  Goal: Absence of Fall and Fall-Related Injury  Outcome: Met     Problem: Restraint, Nonviolent  Goal: Absence of Harm or Injury  Outcome: Met     Problem: Acute Kidney Injury/Impairment  Goal: Fluid and Electrolyte Balance  Outcome: Met  Goal: Improved Oral Intake  Outcome: Met  Goal: Effective Renal Function  Outcome: Met     Problem: Coping Ineffective  Goal: Effective Coping  Outcome: Met

## 2024-08-19 NOTE — ASSESSMENT & PLAN NOTE
Patient on chronic Immunosurpression for liver transplant. Hepatology managing immunosuppression in hospital. Continue home Tacrolimus po BID on discharge.

## 2024-08-19 NOTE — SUBJECTIVE & OBJECTIVE
Interval History: NAEON. Afebrile, VSS on RA. Cr down to 1.6 (baseline ~1.5)    Review of patient's allergies indicates:   Allergen Reactions    Codeine Itching     Other reaction(s): Itching    Lipitor [atorvastatin] Other (See Comments)     Other reaction(s): Muscle pain  Muscle cranmps    Morphine Itching     Other reaction(s): nausea and vomiting     Zoloft [sertraline] Other (See Comments)     Tremors/muscle spasms     Current Facility-Administered Medications   Medication Frequency    0.9%  NaCl infusion (for blood administration) Q24H PRN    cefTRIAXone (ROCEPHIN) 2 g in D5W 100 mL IVPB (MB+) Q24H    dextrose 10% bolus 125 mL 125 mL PRN    dextrose 10% bolus 250 mL 250 mL PRN    glucagon (human recombinant) injection 1 mg PRN    glucose chewable tablet 16 g PRN    glucose chewable tablet 24 g PRN    lactulose 20 gram/30 mL solution Soln 30 g TID    levothyroxine tablet 75 mcg Daily    melatonin tablet 6 mg Nightly PRN    naloxone 0.4 mg/mL injection 0.02 mg PRN    ondansetron injection 4 mg Q6H PRN    oxyCODONE immediate release tablet 5 mg Q6H PRN    pantoprazole EC tablet 40 mg Daily    prochlorperazine injection Soln 5 mg Q6H PRN    rifAXIMin tablet 550 mg BID    sodium chloride 0.9% flush 10 mL PRN    sodium chloride 0.9% flush 10 mL Q12H PRN    sodium chloride 0.9% flush 10 mL Q6H    And    sodium chloride 0.9% flush 10 mL PRN    tacrolimus capsule 0.5 mg BID    venlafaxine 24 hr capsule 150 mg Daily       Objective:     Vital Signs (Most Recent):  Temp: 98.3 °F (36.8 °C) (08/19/24 1127)  Pulse: 73 (08/19/24 1127)  Resp: 18 (08/19/24 1133)  BP: (!) 158/70 (08/19/24 1127)  SpO2: 97 % (08/19/24 1127) Vital Signs (24h Range):  Temp:  [97.2 °F (36.2 °C)-98.3 °F (36.8 °C)] 98.3 °F (36.8 °C)  Pulse:  [67-76] 73  Resp:  [17-18] 18  SpO2:  [95 %-98 %] 97 %  BP: (124-168)/(64-76) 158/70     Weight: 61.5 kg (135 lb 9.3 oz) (08/18/24 0400)  Body mass index is 27.38 kg/m².  Body surface area is 1.6 meters  squared.    I/O last 3 completed shifts:  In: 240 [P.O.:240]  Out: 200 [Urine:200]     Physical Exam     Significant Labs:  All labs within the past 24 hours have been reviewed.     Significant Imaging:  Labs: Reviewed

## 2024-08-19 NOTE — PROGRESS NOTES
[facility] - Med Surg  Palliative Medicine  Progress Note    Patient Name: Elda Hernandez  MRN: [id number]  Admission Date: 8/15/2024  Hospital Length of Stay: 3 days  Code Status: Full Code   Attending Provider: Jeanine Jiménez MD  Consulting Provider: Mauricio Sargent MD  Primary Care Physician: David Lorenzo MD  Principal Problem:Hyponatremia    Patient information was obtained from patient, past medical records, and primary team.      Assessment/Plan:     GI  Decompensated hepatic cirrhosis  54F with Von-Gierkes (glycogen storage) disease s/p OLT in 2002 now with cirrhosis of transplanted liver c/b HE, ascites, pancytopenia. Hepatology noted patient not eligible for repeat transplantation    -recommend continued clear and direct communication regarding prognosis and treatment options as a prerequisite for quality goals of care discussions    Palliative Care  Palliative care encounter  See ACP 8/19    Insight/goals of care- fair insight and prognostic awareness, clear goals of life prolongation at any cost. Would be open to continued conversations as quality of life declines. Values time with her adopted children. Does not want any limits on her care at this time. NOK would be Kody    Social support- supportive Kody. 2 adopted children, 13 and 21    Psychological- limited coping mechanisms, appropriate grief    Spiritual- not a part of a kalyna community of belief system, might be open to spiritual care visits in the future    Symptom management- abdominal pain    Recommendations  -continue current level of care  -continue oxycodone 5mg q6hr PRN for pain   -q6 or q8 intervals likely appropriate given extended half life in ESLD patients          I will sign off. Please contact us if you have any additional questions.    Subjective:     Chief Complaint:   Chief Complaint   Patient presents with    Abnormal labs     Na 126 with last set of labs. Liver transplant pt. Confusion. Jaundice.         HPI:   Ms 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 2002, cirrhosis of transplant liver c/b, SIADH, and CKD3 who presented to [facility] on 8/15 with confusion, headache, and JACQUE. Patient improved with supportive care. Hepatology consulted and deemed patient not a candidate for future liver transplant. Palliative consulted for goals of care.    Hospital Course:  No notes on file    Interval History: Chart reviewed including 24h medication use. Patient resting comfortably in bed, awake and conversant. 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    Arnold-Chiari malformation     Bacteremia 12/22/2023    Depression     Esophageal stricture     Essential tremor     Hypertension     Left bundle branch block     Liver fibrosis, transplanted liver 10/02/2018    Suggested on fibroscan 10/2/18    Migraine without aura     MVP (mitral valve prolapse)     Non-rheumatic mitral regurgitation 10/01/2018    Non-rheumatic tricuspid valve insufficiency 10/01/2018    Osteoporosis     Perforated abdominal viscus 09/04/2020    Recurrent urinary tract infection     Seizures     Shingles 2007    SIADH (syndrome of inappropriate ADH production)     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    Tricuspid valve prolapse     Urolithiasis     Von Gierke disease     s/p liver transplant       Past Surgical History:   Procedure Laterality Date    APPENDECTOMY  6/22/2007    APPLICATION OF WOUND VACUUM-ASSISTED CLOSURE DEVICE N/A 9/18/2020    Procedure: APPLICATION, WOUND VAC;  Surgeon: Zain Decker MD;  Location: [facility] OR [address] [place] [address];  Service: General;  Laterality: N/A;    COLONOSCOPY  5/13/2008     internal hemorrhoids    CRANIOTOMY      ESOPHAGOGASTRODUODENOSCOPY N/A 9/3/2020    Procedure: EGD (ESOPHAGOGASTRODUODENOSCOPY);  Surgeon: Tyrel Vergara MD;  Location: [facility];  Service: Endoscopy;  Laterality: N/A;    ESOPHAGOGASTRODUODENOSCOPY N/A 12/22/2023    Procedure: EGD (ESOPHAGOGASTRODUODENOSCOPY);  Surgeon: Jhony James MD;  [facility]  Service: Endoscopy;  Laterality: N/A;    EXPLORATORY LAPAROTOMY  2020    due to perforated stomach    LAMINECTOMY  3/2001    LIVER TRANSPLANT  9/23/2002    OSSICULAR RECONSTRUCTION  10/4/1995    RIGHT REPLACEMENT PROSTHESIS for cholesteatoma    THYMECTOMY  5/2/2007    TONSILLECTOMY, ADENOIDECTOMY  1/21/2004    TOTAL ABDOMINAL HYSTERECTOMY  3/31/1994       Review of patient's allergies indicates:   Allergen Reactions    Codeine Itching     Other reaction(s): Itching    Lipitor [atorvastatin] Other (See Comments)     Other reaction(s): Muscle pain  Muscle cranmps    Morphine Itching     Other reaction(s): nausea and vomiting     Zoloft [sertraline] Other (See Comments)     Tremors/muscle spasms       Medications:  Continuous Infusions:  Scheduled Meds:   cefTRIAXone (Rocephin) IV (PEDS and ADULTS)  2 g Intravenous Q24H    lactulose  30 g Oral TID    levothyroxine  75 mcg Oral Daily    pantoprazole  40 mg Oral Daily    rifAXImin  550 mg Oral BID    sodium chloride 0.9%  10 mL Intravenous Q6H    tacrolimus  0.5 mg Oral BID    venlafaxine  150 mg Oral Daily     PRN Meds:  Current Facility-Administered Medications:     0.9%  NaCl infusion (for blood administration), , Intravenous, Q24H PRN    dextrose 10%, 12.5 g, Intravenous, PRN    dextrose 10%, 25 g, Intravenous, PRN    glucagon (human recombinant), 1 mg, Intramuscular, PRN    glucose, 16 g, Oral, PRN    glucose, 24 g, Oral, PRN    melatonin, 6 mg, Oral, Nightly PRN    naloxone, 0.02 mg, Intravenous, PRN    ondansetron, 4 mg, Intravenous, Q6H PRN    oxyCODONE, 5 mg, Oral, Q6H PRN     prochlorperazine, 5 mg, Intravenous, Q6H PRN    sodium chloride 0.9%, 10 mL, Intravenous, PRN    sodium chloride 0.9%, 10 mL, Intravenous, Q12H PRN    Flushing PICC/Midline Protocol, , , Until Discontinued **AND** sodium chloride 0.9%, 10 mL, Intravenous, Q6H **AND** sodium chloride 0.9%, 10 mL, Intravenous, PRN    Family History       Problem Relation (Age of Onset)    Heart disease Mother    No Known Problems Father    Stroke Mother          Tobacco Use    Smoking status: Never    Smokeless tobacco: Never   Substance and Sexual Activity    Alcohol use: No    Drug use: No    Sexual activity: Not on file         Objective:     Vital Signs (Most Recent):  Temp: 98.3 °F (36.8 °C) (08/19/24 1127)  Pulse: 73 (08/19/24 1127)  Resp: 18 (08/19/24 1133)  BP: (!) 158/70 (08/19/24 1127)  SpO2: 97 % (08/19/24 1127) Vital Signs (24h Range):  Temp:  [97.2 °F (36.2 °C)-98.3 °F (36.8 °C)] 98.3 °F (36.8 °C)  Pulse:  [67-76] 73  Resp:  [17-18] 18  SpO2:  [95 %-98 %] 97 %  BP: (124-168)/(64-76) 158/70     Weight: 61.5 kg (135 lb 9.3 oz)  Body mass index is 27.38 kg/m².       Physical Exam  Vitals and nursing note reviewed.   Constitutional:       General: She is not in acute distress.     Appearance: She is ill-appearing. She is not toxic-appearing.      Comments: Chronically ill-appearing female lying in bed, awake and conversant, no observed pain behaviors   HENT:      Mouth/Throat:      Mouth: Mucous membranes are moist.   Eyes:      Extraocular Movements: Extraocular movements intact.   Pulmonary:      Effort: Pulmonary effort is normal. No respiratory distress.   Abdominal:      General: Abdomen is flat.      Palpations: Abdomen is soft.   Skin:     General: Skin is warm and dry.   Neurological:      General: No focal deficit present.      Mental Status: She is alert and oriented to person, place, and time.   Psychiatric:      Comments: Tearful at times              Advance Care Planning  Advance Directives:     Decision Making:  " Patient answered questions  Goals of Care: I engaged the patient in a voluntary discussion regarding her wishes and values in the setting of severe, life-limiting illness and end stage liver disease without possibility of transplant. Patient with some insight and prognostic awareness, sharing that she understands that she is not being offered re-transplant. When asked what she knows about the prognosis of ESLD, she shares "I know it sucks". I affirmed this and acknowledged this change in perspective. With her prompting, I shared that most patients with ESLD like hers live on the order of months to short years. She adds "they told me I was going to die 3 years ago". I this to acknowledge the considerable variability and uncertainty that comes with ESLD. I also asked if she had ever considered her values and care wishes if she were ever so ill that we thought she was at the end of her life. She is unsure what her care preferences would be (location, focus of care, life support/procedures) but is clear that she would worry most about her adopted children (13 and 21). She believes that they would be at the center of any decision she makes. "I want to see them grow up". For now, she does not want to place any limits on her future care and would consider increasingly frequent hospitalizations acceptable. I shared that we would honor these wishes but continue to be honest about her trajectory and prognosis. I also added that, at any point, we can consider limits we might place on her care, specifically when thinking about what interventions and care will add to her quality of life. All other questions and concerns addressed at this time           CBC:   Recent Labs   Lab 08/19/24  0501   WBC 1.97*   HGB 7.2*   HCT 21.6*   MCV 88   PLT 48*     BMP:  Recent Labs   Lab 08/19/24  0501   GLU 87      K 3.7      CO2 20*   BUN 15   CREATININE 1.6*   CALCIUM 8.9     LFT:  Lab Results   Component Value Date    AST 42 " (H) 08/19/2024     (H) 04/07/2018    ALKPHOS 243 (H) 08/19/2024    BILITOT 3.5 (H) 08/19/2024     Albumin:   Albumin   Date Value Ref Range Status   08/19/2024 3.3 (L) 3.5 - 5.2 g/dL Final     Protein:   Total Protein   Date Value Ref Range Status   08/19/2024 5.3 (L) 6.0 - 8.4 g/dL Final     Lactic acid:   Lab Results   Component Value Date    LACTATE 1.0 02/28/2023    LACTATE 1.9 09/18/2020     Reviewed CBC with pancytopenia, CMP with persistent/improving JACQUE. Tbili elevated consistent with ESLD    In my care of this patient with acute on chronic severe illness with threat to life and/or bodily function, I am recommending goal-concordant care as noted above. I spent a significant amount of time reviewing external records/ recommendations of other providers (HM, hepatology), reviewing recent test results (CBC, CMP), and discussed care with other subspecialists involved     In addition to above, I spent 20 minutes specifically discussing advance care planning and goals of care with patient at bedside.       The above recommendations communicated directly to primary team on 8/19      Mauricio Sargent MD  Palliative Medicine  [facility]

## 2024-08-19 NOTE — ASSESSMENT & PLAN NOTE
Chronic and controlled. Avoid benzos in light of her confusion. Continue home Effexor to treat. Discontinue Clonazepam she was on at home on discharge.

## 2024-08-19 NOTE — HPI
Ms 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 2002, cirrhosis of transplant liver c/b, SIADH, and CKD3 who presented to [facility] on 8/15 with confusion, headache, and JACQUE. Patient improved with supportive care. Hepatology consulted and deemed patient not a candidate for future liver transplant. Palliative consulted for goals of care.

## 2024-08-20 ENCOUNTER — PATIENT MESSAGE (OUTPATIENT)
Dept: ADMINISTRATIVE | Facility: HOSPITAL | Age: 54
End: 2024-08-20
Payer: MEDICARE

## 2024-08-20 ENCOUNTER — TELEPHONE (OUTPATIENT)
Dept: OPTOMETRY | Facility: CLINIC | Age: 54
End: 2024-08-20
Payer: MEDICARE

## 2024-08-20 NOTE — TELEPHONE ENCOUNTER
----- Message from Khadijah Killian sent at 8/20/2024  4:01 PM CDT -----  Type:  Sooner Appointment Request    Caller is requesting a sooner appointment.  Caller declined first available appointment listed below.  Caller will not accept being placed on the waitlist and is requesting a message be sent to doctor.    Name of Caller:  pt   When is the first available appointment?  N./a   Symptoms:  cataract , has not been diagnosed   Would the patient rather a call back or a response via [facility]? Call  Best Call Back Number:  [phone] (home)     Additional Information:  please advise

## 2024-08-20 NOTE — TELEPHONE ENCOUNTER
No care due was identified.  [facility] Embedded Care Due Messages. Reference number: [id number].   8/19/2024 11:38:44 PM CDT

## 2024-08-21 ENCOUNTER — OFFICE VISIT (OUTPATIENT)
Dept: FAMILY MEDICINE | Facility: CLINIC | Age: 54
End: 2024-08-21
Payer: MEDICARE

## 2024-08-21 VITALS
OXYGEN SATURATION: 99 % | DIASTOLIC BLOOD PRESSURE: 64 MMHG | HEIGHT: 59 IN | SYSTOLIC BLOOD PRESSURE: 118 MMHG | BODY MASS INDEX: 27.33 KG/M2 | HEART RATE: 64 BPM | WEIGHT: 135.56 LBS

## 2024-08-21 DIAGNOSIS — N18.9 ACUTE KIDNEY INJURY SUPERIMPOSED ON CHRONIC KIDNEY DISEASE: ICD-10-CM

## 2024-08-21 DIAGNOSIS — K74.60 CIRRHOSIS OF LIVER WITH ASCITES, UNSPECIFIED HEPATIC CIRRHOSIS TYPE: Primary | Chronic | ICD-10-CM

## 2024-08-21 DIAGNOSIS — I10 ESSENTIAL HYPERTENSION: Chronic | ICD-10-CM

## 2024-08-21 DIAGNOSIS — N17.9 ACUTE KIDNEY INJURY SUPERIMPOSED ON CHRONIC KIDNEY DISEASE: ICD-10-CM

## 2024-08-21 DIAGNOSIS — K76.82 HEPATIC ENCEPHALOPATHY: Chronic | ICD-10-CM

## 2024-08-21 DIAGNOSIS — R13.19 ESOPHAGEAL DYSPHAGIA: ICD-10-CM

## 2024-08-21 DIAGNOSIS — D61.818 PANCYTOPENIA: Chronic | ICD-10-CM

## 2024-08-21 DIAGNOSIS — E03.9 ACQUIRED HYPOTHYROIDISM: ICD-10-CM

## 2024-08-21 DIAGNOSIS — F32.1 CURRENT MODERATE EPISODE OF MAJOR DEPRESSIVE DISORDER WITHOUT PRIOR EPISODE: ICD-10-CM

## 2024-08-21 DIAGNOSIS — R53.81 DEBILITY: ICD-10-CM

## 2024-08-21 DIAGNOSIS — T86.41 CHRONIC REJECTION OF LIVER TRANSPLANT: Chronic | ICD-10-CM

## 2024-08-21 DIAGNOSIS — R18.8 CIRRHOSIS OF LIVER WITH ASCITES, UNSPECIFIED HEPATIC CIRRHOSIS TYPE: Primary | Chronic | ICD-10-CM

## 2024-08-21 PROBLEM — R79.89 PRERENAL AZOTEMIA: Status: RESOLVED | Noted: 2023-09-27 | Resolved: 2024-08-21

## 2024-08-21 LAB
BACTERIA BLD CULT: NORMAL
BACTERIA BLD CULT: NORMAL

## 2024-08-21 PROCEDURE — G2211 COMPLEX E/M VISIT ADD ON: HCPCS | Mod: S$GLB,,, | Performed by: INTERNAL MEDICINE

## 2024-08-21 PROCEDURE — 4010F ACE/ARB THERAPY RXD/TAKEN: CPT | Mod: CPTII,S$GLB,, | Performed by: INTERNAL MEDICINE

## 2024-08-21 PROCEDURE — 1111F DSCHRG MED/CURRENT MED MERGE: CPT | Mod: CPTII,S$GLB,, | Performed by: INTERNAL MEDICINE

## 2024-08-21 PROCEDURE — 3078F DIAST BP <80 MM HG: CPT | Mod: CPTII,S$GLB,, | Performed by: INTERNAL MEDICINE

## 2024-08-21 PROCEDURE — 99214 OFFICE O/P EST MOD 30 MIN: CPT | Mod: S$GLB,,, | Performed by: INTERNAL MEDICINE

## 2024-08-21 PROCEDURE — 1159F MED LIST DOCD IN RCRD: CPT | Mod: CPTII,S$GLB,, | Performed by: INTERNAL MEDICINE

## 2024-08-21 PROCEDURE — 99999 PR PBB SHADOW E&M-EST. PATIENT-LVL III: CPT | Mod: PBBFAC,,, | Performed by: INTERNAL MEDICINE

## 2024-08-21 PROCEDURE — 1160F RVW MEDS BY RX/DR IN RCRD: CPT | Mod: CPTII,S$GLB,, | Performed by: INTERNAL MEDICINE

## 2024-08-21 PROCEDURE — 3008F BODY MASS INDEX DOCD: CPT | Mod: CPTII,S$GLB,, | Performed by: INTERNAL MEDICINE

## 2024-08-21 PROCEDURE — 3074F SYST BP LT 130 MM HG: CPT | Mod: CPTII,S$GLB,, | Performed by: INTERNAL MEDICINE

## 2024-08-21 RX ORDER — PROMETHAZINE HYDROCHLORIDE 25 MG/1
25 TABLET ORAL EVERY 6 HOURS PRN
Qty: 30 TABLET | Refills: 0 | Status: SHIPPED | OUTPATIENT
Start: 2024-08-21 | End: 2024-09-20

## 2024-08-21 NOTE — PROGRESS NOTES
[facility]  Primary Care   [address] [facility] [address]       Patient ID: Elda Hernandez     Chief Complaint:   Chief Complaint   Patient presents with    Follow-up     4 week f/u           HPI:  Follow-up from a hospitalization at [facility] recently where she was taken for acute kidney injury due to over-diuresis with Lasix and spironolactone.  She had a lot of electrolyte abnormalities and some acute kidney injury that thankfully improved with stopping the diuretics and giving her some IV fluids.  Kidney function is not perfect but it is much better than when she was admitted.  She also got 1 unit of packed red blood cells for some symptomatic anemia.  Last CBC showed that her hemoglobin was adequate at 7.2.  White blood cell count is low and platelets are still low and I think this is all due to her transplant liver that is failing unfortunately.  Hepatology did discuss this with her and because of her situation of having a lot of intra-abdominal pathology due to a perforated viscus many years ago, she is not a candidate for a another liver transplant.  They did discuss palliative care with her and her  in the hospital but not all the questions were asked so they wanted some time to think about it.  We did take the time to discuss the various ins and outs about palliative care and hospice care and they are still going to think about it but we all think that maybe palliative care's the best thing for her so that we can still provide adequate care without the intention to cure.  For now the more pressing issue is her dysphagia.  Back in December of 2023 EGD showed a 9 cm esophageal tear and a stricture at the gastroesophageal junction.  She does have dysphagia for solids so I do want to get her another EGD with dilations that she getting yeast enjoys food.  She has stopped diuretics for now and I agree with that.  I do not want to restart them unless we have to in it that time we will  "probably do live alone as-needed basis.  She is still taking lactulose and having stools to control the am ammonia.  She has decided that she does not want to take the clonazepam and I agree with that because it will stick around a long time with her impaired kidney function.  She does request a refill on Phenergan for episodic nausea and I will fill that but if she gets goofy that is the reason why.  Of note, she thinks she is having an allergy flare but her son recently tested positive for COVID some swab her while she is here today.  Thankfully she has not had any trouble breathing.    Review of Systems       Fatigue     Objective:      Physical Exam   Physical Exam       Ambulates in wheelchair     Vitals:   Vitals:    08/21/24 1542   BP: 118/64   Pulse: 64   SpO2: 99%   Weight: 61.5 kg (135 lb 9.3 oz)   Height: 4' 11" (1.499 m)        Assessment:           Plan:       Elda Timmonsfrene  was seen today for follow-up and may need lab work.    Diagnoses and all orders for this visit:    Elda Doran was seen today for follow-up.    Diagnoses and all orders for this visit:    Cirrhosis of liver with ascites, unspecified hepatic cirrhosis type  Continue immunosuppressants.  Stop Lasix and Aldactone for now and we may restart them depending on how quickly the ascites returns    Esophageal dysphagia  -     Case Request Endoscopy: ESOPHAGOGASTRODUODENOSCOPY (EGD)    Current moderate episode of major depressive disorder without prior episode  Continue venlafaxine    Essential hypertension, dx 2010  Controlled with ramipril but I want her to stop it for now.      Acute kidney injury superimposed on chronic kidney disease  Was improving without diuretics we will monitor labs.    Pancytopenia  -     CBC Auto Differential; Future  Monitor Labs    Acquired hypothyroidism  Controlled with levothyroxine    Hepatic encephalopathy  Controlled with lactulose    Debility  Ambulates in wheelchair     Chronic rejection of liver " transplant  -     Comprehensive Metabolic Panel; Future  Monitor Labs    Visit today included increased complexity associated with the care of the episodic problem dysphagia liver transplant hypertension pancytopenia hypothyroidism addressed and managing the longitudinal care of the patient due to the serious and/or complex managed problem(s) .           David Lorenzo MD

## 2024-08-23 LAB — BACTERIA SPEC ANAEROBE CULT: NORMAL

## 2024-08-24 ENCOUNTER — LAB VISIT (OUTPATIENT)
Dept: LAB | Facility: HOSPITAL | Age: 54
End: 2024-08-24
Attending: INTERNAL MEDICINE
Payer: MEDICARE

## 2024-08-24 DIAGNOSIS — T86.41 CHRONIC REJECTION OF LIVER TRANSPLANT: Chronic | ICD-10-CM

## 2024-08-24 DIAGNOSIS — D61.818 PANCYTOPENIA: Chronic | ICD-10-CM

## 2024-08-24 LAB
ALBUMIN SERPL BCP-MCNC: 3.3 G/DL (ref 3.5–5.2)
ALP SERPL-CCNC: 365 U/L (ref 55–135)
ALT SERPL W/O P-5'-P-CCNC: 28 U/L (ref 10–44)
ANION GAP SERPL CALC-SCNC: 9 MMOL/L (ref 8–16)
AST SERPL-CCNC: 65 U/L (ref 10–40)
BASOPHILS # BLD AUTO: 0.02 K/UL (ref 0–0.2)
BASOPHILS NFR BLD: 1 % (ref 0–1.9)
BILIRUB SERPL-MCNC: 3.6 MG/DL (ref 0.1–1)
BUN SERPL-MCNC: 21 MG/DL (ref 6–20)
CALCIUM SERPL-MCNC: 9.1 MG/DL (ref 8.7–10.5)
CHLORIDE SERPL-SCNC: 108 MMOL/L (ref 95–110)
CO2 SERPL-SCNC: 22 MMOL/L (ref 23–29)
CREAT SERPL-MCNC: 1.4 MG/DL (ref 0.5–1.4)
DIFFERENTIAL METHOD BLD: ABNORMAL
EOSINOPHIL # BLD AUTO: 0.1 K/UL (ref 0–0.5)
EOSINOPHIL NFR BLD: 4.5 % (ref 0–8)
ERYTHROCYTE [DISTWIDTH] IN BLOOD BY AUTOMATED COUNT: 16.4 % (ref 11.5–14.5)
EST. GFR  (NO RACE VARIABLE): 44.2 ML/MIN/1.73 M^2
GLUCOSE SERPL-MCNC: 85 MG/DL (ref 70–110)
HCT VFR BLD AUTO: 24.3 % (ref 37–48.5)
HGB BLD-MCNC: 8.2 G/DL (ref 12–16)
IMM GRANULOCYTES # BLD AUTO: 0.01 K/UL (ref 0–0.04)
IMM GRANULOCYTES NFR BLD AUTO: 0.5 % (ref 0–0.5)
LYMPHOCYTES # BLD AUTO: 0.4 K/UL (ref 1–4.8)
LYMPHOCYTES NFR BLD: 19.4 % (ref 18–48)
MCH RBC QN AUTO: 31.2 PG (ref 27–31)
MCHC RBC AUTO-ENTMCNC: 33.7 G/DL (ref 32–36)
MCV RBC AUTO: 92 FL (ref 82–98)
MONOCYTES # BLD AUTO: 0.4 K/UL (ref 0.3–1)
MONOCYTES NFR BLD: 18.4 % (ref 4–15)
NEUTROPHILS # BLD AUTO: 1.1 K/UL (ref 1.8–7.7)
NEUTROPHILS NFR BLD: 56.2 % (ref 38–73)
NRBC BLD-RTO: 0 /100 WBC
PLATELET # BLD AUTO: 50 K/UL (ref 150–450)
PMV BLD AUTO: 11.4 FL (ref 9.2–12.9)
POTASSIUM SERPL-SCNC: 4.4 MMOL/L (ref 3.5–5.1)
PROT SERPL-MCNC: 5.8 G/DL (ref 6–8.4)
RBC # BLD AUTO: 2.63 M/UL (ref 4–5.4)
SODIUM SERPL-SCNC: 139 MMOL/L (ref 136–145)
WBC # BLD AUTO: 2.01 K/UL (ref 3.9–12.7)

## 2024-08-24 PROCEDURE — 36415 COLL VENOUS BLD VENIPUNCTURE: CPT | Mod: PO | Performed by: INTERNAL MEDICINE

## 2024-08-24 PROCEDURE — 80053 COMPREHEN METABOLIC PANEL: CPT | Performed by: INTERNAL MEDICINE

## 2024-08-24 PROCEDURE — 85025 COMPLETE CBC W/AUTO DIFF WBC: CPT | Performed by: INTERNAL MEDICINE

## 2024-09-09 ENCOUNTER — HOSPITAL ENCOUNTER (OUTPATIENT)
Dept: RADIOLOGY | Facility: HOSPITAL | Age: 54
Discharge: HOME | End: 2024-09-09
Attending: INTERNAL MEDICINE
Payer: MEDICARE

## 2024-09-09 DIAGNOSIS — C22.0 HEPATOCELLULAR CARCINOMA: ICD-10-CM

## 2024-09-09 PROCEDURE — 93976 VASCULAR STUDY: CPT | Mod: TC

## 2024-09-09 PROCEDURE — 76705 ECHO EXAM OF ABDOMEN: CPT | Mod: 59,TC

## 2024-09-10 ENCOUNTER — TELEPHONE (OUTPATIENT)
Dept: TRANSPLANT | Facility: CLINIC | Age: 54
End: 2024-09-10
Payer: MEDICARE

## 2024-09-10 NOTE — TELEPHONE ENCOUNTER
Sent message to let patient know    ----- Message from Jaya Nielsen MD sent at 9/10/2024  7:07 AM CDT -----  Results reviewed. No action.

## 2024-09-13 ENCOUNTER — OFFICE VISIT (OUTPATIENT)
Dept: CARDIOLOGY | Facility: CLINIC | Age: 54
End: 2024-09-13
Payer: MEDICARE

## 2024-09-13 ENCOUNTER — HOSPITAL ENCOUNTER (OUTPATIENT)
Dept: CARDIOLOGY | Facility: HOSPITAL | Age: 54
Discharge: HOME | End: 2024-09-13
Attending: INTERNAL MEDICINE
Payer: MEDICARE

## 2024-09-13 ENCOUNTER — PATIENT OUTREACH (OUTPATIENT)
Dept: ADMINISTRATIVE | Facility: HOSPITAL | Age: 54
End: 2024-09-13
Payer: MEDICARE

## 2024-09-13 VITALS — BODY MASS INDEX: 23.99 KG/M2 | WEIGHT: 119 LBS | HEIGHT: 59 IN

## 2024-09-13 VITALS
OXYGEN SATURATION: 100 % | RESPIRATION RATE: 18 BRPM | HEIGHT: 59 IN | HEART RATE: 66 BPM | WEIGHT: 119.31 LBS | BODY MASS INDEX: 24.05 KG/M2 | SYSTOLIC BLOOD PRESSURE: 184 MMHG | DIASTOLIC BLOOD PRESSURE: 80 MMHG

## 2024-09-13 DIAGNOSIS — I70.0 AORTIC ATHEROSCLEROSIS: Primary | ICD-10-CM

## 2024-09-13 DIAGNOSIS — K74.00 LIVER FIBROSIS, TRANSPLANTED LIVER: Chronic | ICD-10-CM

## 2024-09-13 DIAGNOSIS — Z79.60 LONG-TERM USE OF IMMUNOSUPPRESSANT MEDICATION: Chronic | ICD-10-CM

## 2024-09-13 DIAGNOSIS — T86.49 LIVER FIBROSIS, TRANSPLANTED LIVER: Chronic | ICD-10-CM

## 2024-09-13 DIAGNOSIS — I44.7 LBBB (LEFT BUNDLE BRANCH BLOCK): ICD-10-CM

## 2024-09-13 DIAGNOSIS — E22.2 SIADH (SYNDROME OF INAPPROPRIATE ADH PRODUCTION): Chronic | ICD-10-CM

## 2024-09-13 DIAGNOSIS — Z91.89 SEDENTARY LIFESTYLE: ICD-10-CM

## 2024-09-13 DIAGNOSIS — E88.09 HYPOALBUMINEMIA: ICD-10-CM

## 2024-09-13 DIAGNOSIS — R53.82 CHRONIC FATIGUE: ICD-10-CM

## 2024-09-13 DIAGNOSIS — R29.6 MULTIPLE FALLS: ICD-10-CM

## 2024-09-13 DIAGNOSIS — N18.31 STAGE 3A CHRONIC KIDNEY DISEASE: Chronic | ICD-10-CM

## 2024-09-13 DIAGNOSIS — R06.09 DOE (DYSPNEA ON EXERTION): ICD-10-CM

## 2024-09-13 DIAGNOSIS — I11.9 LVH (LEFT VENTRICULAR HYPERTROPHY) DUE TO HYPERTENSIVE DISEASE, WITHOUT HEART FAILURE: Chronic | ICD-10-CM

## 2024-09-13 DIAGNOSIS — E78.00 HYPERCHOLESTEROLEMIA: ICD-10-CM

## 2024-09-13 DIAGNOSIS — I70.0 AORTIC ATHEROSCLEROSIS: ICD-10-CM

## 2024-09-13 DIAGNOSIS — R06.83 LOUD SNORING: ICD-10-CM

## 2024-09-13 DIAGNOSIS — R29.898 WEAKNESS OF BOTH LOWER EXTREMITIES: ICD-10-CM

## 2024-09-13 DIAGNOSIS — D69.6 THROMBOCYTOPENIA, UNSPECIFIED: Chronic | ICD-10-CM

## 2024-09-13 DIAGNOSIS — Z94.4 S/P LIVER TRANSPLANT: Chronic | ICD-10-CM

## 2024-09-13 DIAGNOSIS — M62.81 GENERALIZED MUSCLE WEAKNESS: ICD-10-CM

## 2024-09-13 DIAGNOSIS — I34.0 NONRHEUMATIC MITRAL VALVE REGURGITATION: ICD-10-CM

## 2024-09-13 DIAGNOSIS — G47.19 EXCESSIVE DAYTIME SLEEPINESS: ICD-10-CM

## 2024-09-13 DIAGNOSIS — I10 ESSENTIAL HYPERTENSION: Chronic | ICD-10-CM

## 2024-09-13 DIAGNOSIS — R18.8 OTHER ASCITES: Chronic | ICD-10-CM

## 2024-09-13 LAB
AORTIC ROOT ANNULUS: 2.35 CM
AORTIC VALVE CUSP SEPERATION: 1.84 CM
ASCENDING AORTA: 1.91 CM
AV INDEX (PROSTH): 0.76
AV MEAN GRADIENT: 6 MMHG
AV PEAK GRADIENT: 11 MMHG
AV REGURGITATION PRESSURE HALF TIME: 484.75 MS
AV VALVE AREA BY VELOCITY RATIO: 2.38 CM²
AV VALVE AREA: 2.3 CM²
AV VELOCITY RATIO: 0.78
BSA FOR ECHO PROCEDURE: 1.5 M2
CV ECHO LV RWT: 0.74 CM
DOP CALC AO PEAK VEL: 1.64 M/S
DOP CALC AO VTI: 36.9 CM
DOP CALC LVOT AREA: 3 CM2
DOP CALC LVOT DIAMETER: 1.97 CM
DOP CALC LVOT PEAK VEL: 1.28 M/S
DOP CALC LVOT STROKE VOLUME: 85 CM3
DOP CALC MV VTI: 33.3 CM
DOP CALCLVOT PEAK VEL VTI: 27.9 CM
E WAVE DECELERATION TIME: 196.63 MSEC
E/A RATIO: 1.07
E/E' RATIO: 16.73 M/S
ECHO LV POSTERIOR WALL: 1.43 CM (ref 0.6–1.1)
EJECTION FRACTION: 57 %
FRACTIONAL SHORTENING: 29 % (ref 28–44)
GLOBAL LONGITUIDAL STRAIN: 20 %
INTERVENTRICULAR SEPTUM: 1.43 CM (ref 0.6–1.1)
IVC DIAMETER: 1.04 CM
LA MAJOR: 4.69 CM
LA MINOR: 3.76 CM
LEFT ATRIUM AREA SYSTOLIC (APICAL 2 CHAMBER): 10.91 CM2
LEFT ATRIUM AREA SYSTOLIC (APICAL 4 CHAMBER): 17.1 CM2
LEFT ATRIUM SIZE: 4.4 CM
LEFT ATRIUM VOLUME INDEX MOD: 24.6 ML/M2
LEFT ATRIUM VOLUME MOD: 36.35 CM3
LEFT INTERNAL DIMENSION IN SYSTOLE: 2.75 CM (ref 2.1–4)
LEFT VENTRICLE DIASTOLIC VOLUME INDEX: 44.37 ML/M2
LEFT VENTRICLE DIASTOLIC VOLUME: 65.67 ML
LEFT VENTRICLE END SYSTOLIC VOLUME APICAL 2 CHAMBER: 22.44 ML
LEFT VENTRICLE END SYSTOLIC VOLUME APICAL 4 CHAMBER: 53.9 ML
LEFT VENTRICLE MASS INDEX: 140 G/M2
LEFT VENTRICLE SYSTOLIC VOLUME INDEX: 19.1 ML/M2
LEFT VENTRICLE SYSTOLIC VOLUME: 28.3 ML
LEFT VENTRICULAR INTERNAL DIMENSION IN DIASTOLE: 3.89 CM (ref 3.5–6)
LEFT VENTRICULAR MASS: 207.5 G
LV LATERAL E/E' RATIO: 15.33 M/S
LV SEPTAL E/E' RATIO: 18.4 M/S
LVED V (TEICH): 65.67 ML
LVES V (TEICH): 28.3 ML
LVOT MG: 3.02 MMHG
LVOT MV: 0.8 CM/S
MV MEAN GRADIENT: 2 MMHG
MV PEAK A VEL: 0.86 M/S
MV PEAK E VEL: 0.92 M/S
MV PEAK GRADIENT: 4 MMHG
MV STENOSIS PRESSURE HALF TIME: 66.54 MS
MV VALVE AREA BY CONTINUITY EQUATION: 2.55 CM2
MV VALVE AREA P 1/2 METHOD: 3.31 CM2
OHS CV RV/LV RATIO: 0.62 CM
PISA AR MAX VEL: 4.56 M/S
PISA MRMAX VEL: 6.33 M/S
PISA TR MAX VEL: 3.09 M/S
PULM VEIN S/D RATIO: 2.19
PV MV: 0.99 M/S
PV PEAK D VEL: 0.36 M/S
PV PEAK GRADIENT: 7 MMHG
PV PEAK S VEL: 0.79 M/S
PV PEAK VELOCITY: 1.36 M/S
RA MAJOR: 3.98 CM
RA PRESSURE ESTIMATED: 3 MMHG
RA WIDTH: 2.57 CM
RIGHT VENTRICLE DIASTOLIC BASEL DIMENSION: 2.4 CM
RIGHT VENTRICLE DIASTOLIC LENGTH: 4.9 CM
RIGHT VENTRICLE DIASTOLIC MID DIMENSION: 1.5 CM
RIGHT VENTRICLE FREE WALL STRAIN: 21.9 %
RIGHT VENTRICLE GLOBAL SYSTOLIC STRAIN: 19.1 %
RIGHT VENTRICULAR END-DIASTOLIC DIMENSION: 2.43 CM
RIGHT VENTRICULAR LENGTH IN DIASTOLE (APICAL 4-CHAMBER VIEW): 4.88 CM
RV MID DIAMA: 1.52 CM
RV TB RVSP: 6 MMHG
SINUS: 2.34 CM
STJ: 2.1 CM
TDI LATERAL: 0.06 M/S
TDI SEPTAL: 0.05 M/S
TDI: 0.06 M/S
TR MAX PG: 38 MMHG
TRICUSPID ANNULAR PLANE SYSTOLIC EXCURSION: 2.2 CM
TRICUSPID VALVE PEAK A WAVE VELOCITY: 0.46 M/S
TV PEAK E VEL: 0.7 M/S
TV REST PULMONARY ARTERY PRESSURE: 41 MMHG
Z-SCORE OF LEFT VENTRICULAR DIMENSION IN END DIASTOLE: -1.26
Z-SCORE OF LEFT VENTRICULAR DIMENSION IN END SYSTOLE: 0.03

## 2024-09-13 PROCEDURE — 93356 MYOCRD STRAIN IMG SPCKL TRCK: CPT

## 2024-09-13 PROCEDURE — 99999 PR PBB SHADOW E&M-EST. PATIENT-LVL IV: CPT | Mod: PBBFAC,,, | Performed by: INTERNAL MEDICINE

## 2024-09-13 PROCEDURE — 93306 TTE W/DOPPLER COMPLETE: CPT

## 2024-09-13 RX ORDER — RAMIPRIL 5 MG/1
5 CAPSULE ORAL DAILY
Qty: 90 CAPSULE | Refills: 3 | Status: SHIPPED | OUTPATIENT
Start: 2024-09-13 | End: 2025-09-13

## 2024-09-13 NOTE — PROGRESS NOTES
Subjective:    Patient ID:  Elda Hernandez is a 54 y.o. female who presents for evaluation of Hypertension (Pt stated she has been off her BP meds since she has been out of the hosp. Ramipril)  PCP and referred by David Lorenzo MD  Prior cardiologist: Essie Frank MD, last seen 7/2022  Liver transplant: Jaya Nielsen MD  Lives with , Kody, here with patient, non-smoker  Disabled with liver transplant, 2002, prior CMA    Social Determinate of Health: Do you have any problem with the following: hearing, easy fatigue  Health Literacy (understanding): high  Vaccination: up to date yes, covid vaccine, covid infection no  Activities: Any Problems or Limitations hard time with balance, fatigue very easily, 2nd stage liver failure. Unable to work out, mostly sedentary since 2020  Nicotine: non-smoker  Alcohol: How often? none  Illicit Drugs: none, off for 2 months Rx butalbital for HA, every other day, started 2017  Cardiac Symptoms: none  Home BP: off BP meds since DC, recent home 150s/90   Medication Compliance: yes, do not miss  Diet: liquid diet  Caffeine: How much do you consume a day? none  Labs:   Lab Results   Component Value Date    TSH 1.410 07/27/2024        Lab Results   Component Value Date    HGBA1C 4.3 09/03/2020       Lab Results   Component Value Date    WBC 2.01 (L) 08/24/2024    HGB 8.2 (L) 08/24/2024    HCT 24.3 (L) 08/24/2024    MCV 92 08/24/2024    PLT 50 (L) 08/24/2024       CMP  Sodium   Date Value Ref Range Status   08/24/2024 139 136 - 145 mmol/L Final     Potassium   Date Value Ref Range Status   08/24/2024 4.4 3.5 - 5.1 mmol/L Final     Chloride   Date Value Ref Range Status   08/24/2024 108 95 - 110 mmol/L Final     CO2   Date Value Ref Range Status   08/24/2024 22 (L) 23 - 29 mmol/L Final     Glucose   Date Value Ref Range Status   08/24/2024 85 70 - 110 mg/dL Final     BUN   Date Value Ref Range Status   08/24/2024 21 (H) 6 - 20 mg/dL Final     Creatinine   Date Value Ref Range  Status   08/24/2024 1.4 0.5 - 1.4 mg/dL Final     Calcium   Date Value Ref Range Status   08/24/2024 9.1 8.7 - 10.5 mg/dL Final     Total Protein   Date Value Ref Range Status   08/24/2024 5.8 (L) 6.0 - 8.4 g/dL Final     Albumin   Date Value Ref Range Status   08/24/2024 3.3 (L) 3.5 - 5.2 g/dL Final     Total Bilirubin   Date Value Ref Range Status   08/24/2024 3.6 (H) 0.1 - 1.0 mg/dL Final     Comment:     For infants and newborns, interpretation of results should be based  on gestational age, weight and in agreement with clinical  observations.    Premature Infant recommended reference ranges:  Up to 24 hours.............<8.0 mg/dL  Up to 48 hours............<12.0 mg/dL  3-5 days..................<15.0 mg/dL  6-29 days.................<15.0 mg/dL       Alkaline Phosphatase   Date Value Ref Range Status   08/24/2024 365 (H) 55 - 135 U/L Final     AST   Date Value Ref Range Status   08/24/2024 65 (H) 10 - 40 U/L Final     ALT   Date Value Ref Range Status   08/24/2024 28 10 - 44 U/L Final     Anion Gap   Date Value Ref Range Status   08/24/2024 9 8 - 16 mmol/L Final     eGFR if    Date Value Ref Range Status   05/28/2022 >60.0 >60 mL/min/1.73 m^2 Final     eGFR if non    Date Value Ref Range Status   05/28/2022 >60.0 >60 mL/min/1.73 m^2 Final     Comment:     Calculation used to obtain the estimated glomerular filtration  rate (eGFR) is the CKD-EPI equation.        @labrcntip(troponini)@    BNP   Date Value Ref Range Status   09/04/2020 143 (H) 0 - 99 pg/mL Final     Comment:     Values of less than 100 pg/ml are consistent with non-CHF populations.   }   Lab Results   Component Value Date    CHOL 196 09/27/2023    CHOL 298 (H) 05/12/2017    CHOL 199 08/02/2014     Lab Results   Component Value Date    HDL 64 09/27/2023     (H) 05/12/2017    HDL 74 08/02/2014     Lab Results   Component Value Date    LDLCALC 114.8 09/27/2023    LDLCALC 147.2 05/12/2017    LDLCALC 109.0  "2014     Lab Results   Component Value Date    TRIG 86 2023    TRIG 90 10/10/2020    TRIG 409 (H) 10/07/2020     Lab Results   Component Value Date    CHOLHDL 32.7 2023    CHOLHDL 45.0 2017    CHOLHDL 37.2 2014       Last Echo: 3/2022  Last stress test: Lexiscan 3/2022  Cardiovascular angiogram: none  EC/15/2024 NSR, rate 72, LBBB  Fundoscopic exam: within the past 2 year, negative for retinopathy    In 2023:  [facility] referred for CV follow up with complex past medical history and dx of MVP with MR and TR. No heart issues but quite limited due to comorbidities. Mother had 2nd stroke at age 67.    Echo 3/2022 - The left ventricle is normal in size with concentric hypertrophy and normal systolic function.  Indeterminate left ventricular diastolic function.  The estimated ejection fraction is 65%.  Normal right ventricular size with normal right ventricular systolic function.  Mild aortic regurgitation.  Moderate eccentric MR mitral regurgitation.  Mild tricuspid regurgitation.  Intermediate central venous pressure (8 mmHg).  The estimated PA systolic pressure is 37 mmHg.    Lexiscan - Normal myocardial perfusion scan. There is no evidence of myocardial ischemia or infarction.  ·  The gated perfusion images showed an ejection fraction of 75% at rest.  ·  The EKG portion of this study is negative for ischemia.  ·  There were no arrhythmias during stress.    David Lorenzo MD noted 2023 " Annual exam and doing better than the last time I saw her where she had memory loss and hand tremor due to hepatic encephalopathy. Ammonia level was elevated and she did get a lot better with Lactulose. Xifaxin was added too.   Still has short term memory loss and will try to find a Neurologist closer to home in [place].    Liver Ultrasound stable but she does have cirrhosis that we are monitoring.   Ascites Controlled with meds (Lasix and Aldactone)   To see Dr. Upton (Cards) and will check lipids.    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 Moderate atherosclerotic calcifications. Multiple venous collaterals are seen throughout the abdomen.  Heart: The visualized portions of the heart are normal. No pericardial effusion.    Essie Frank MD noted 7/2022 "DISCUSSED TESTS, NORMAL STRESS, ECHO NORMAL LV FX, MOD MR, MILD AI, 40-49% IRAJ STENOSIS, OVERALL DOING OKAY FROM LIVER STANDPOINT, NO CHEST PAIN NO SHORTNESS OF BREATH    VS - BP:  (!) 165/76  (!) 152/76  Pulse:  78     Weight:  49.4 kg (108 lb 14.5 oz)     Height:  4' 11" (1.499 m)    Essential hypertension     LBBB (left bundle branch block)     Mitral and aortic regurgitation - Primary     Stenosis of right carotid artery     Hypercholesterolemia    INCREASE RAMIPRIL TO 5 MG, WATCH BLOOD PRESSURE, CONSIDER LOW-DOSE STATIN IN VIEW OF CAROTID STENOSIS PRAVACHOL MIGHT BE BEST OPTION IN THIS PATIENT WITH LIVER DISEASE, IF OK WITH TRANSPLANT TEAM, PATIENT CHECK"    HPI comments: in 9/2024, return post DC, no clear CV problem except HTN. Off meds due to hypotension at time of hospitalization. Currently BP elevated at home. Little activity due to chronic fatigue.    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    She was recently hospitalized about 2-3 weeks ago:   Labs done on 7/25/2024 showed acute on chronic hyponatremia (sodium 125 mmol/L,), hyperkalemia (potassium 5.8 mmol/L), acute kidney injury (BUN 44 mg/dL and creatinine 5.2 mg/dL, [baseline 1.0], elevated ammonia (200 umol/L).hyponatermia and JACQUE.  Due to her history of liver transplant, her case was discussed with Hepatology at [facility]. Hepatology was consulted as was planned prior to transfer. She was put on normal saline at 100 mL/hr. Sodium improved to 129 mmol/L by 7/29/2024 and acute kidney injury improved. Hepatology recommended stopping IV fluids and resuming diuretics at the previous doses before they were increased. IV fluids were stopped. BUN and creatinine continued to improve but sodium decreased a little to 127. Interventional Radiology was unable to find a pocket of fluid for therapeutic paracentesis, a problem she had in the past. She was discharged home. Sodium was 129 at discharge. She was advised to hold furosemide and spironolactone until she gets outpatient labs and follows up with her hepatologist.     Hospital Course:   Patient admitted with symptomatic hyponatremia. Nephrology consulted and urine studies done and concern for hypovolemic hyponatremia so given IV Albumin infusion and 1 litre of normal saline. Sodium improved from 126 on admit to 130 on 8/16 to back to normal at 138 on 8/17 consistent with hypovolemic hyponatremia. Creatinien level also elevated at above baseline at 2.2 on admit and now back down to 1.7 on 8/17 after IV Albumin and normal saline and thus also consistent with hypovolemia causing JACQUE. Patient note dot have ascites on admit related to her  cirrhosis of liver and IR consulted and 1.1 liters of ascitic fluid removed by IR on 8/16 without complications. Ascitic fluid only had 71 WBC so not consistent with SBP. SAAG 1.9 consistent with ascites related to portal HTN and consistent with her history of cirrhosis in transplanted liver. Hepatology consulted and also following for her liver transplant and cirrhosis and continuing with home immunosuppression in the hospital for her liver transplant. Hgb down to 7.0 on 8/17 after hydration and transfused 1 unit of PRBCs. Hgb improved to 7.3 on 8/18. Patient states she is feeling much better and still having some abdominal pain but improved from discharge. Hepatology recommending palliative care consult as patient not a candidate for future liver transplant and has had multiple recent admission to have goals of care discussion with patient I did speak with patient about what Hepatology stated and she was surprised as unaware she is not a liver transplant candidate for the future. Patient states she is not ready to make any life changing decisions at this time but I told her she did not have to make any decisions immediately but would be good to sit and talk to palliative care to see what her future options are and what goals of care she would want to make in the future as her liver disease is to likely progress and lead to further complications and likely hospitalizations and have impact on her quality of life. Creatinine stable at 1.7 on 8/18. Palliative care did met with patient and discussed goals of care with patient. As noted patient nt ready to make any end of life care decisions at this time but discussion started. Creatinine stable at 1.6 on dy of discharge. Patient discharged home in good condition on 8/19. Hyponatremia resolved on discharge.     Neuro  Acute metabolic encephalopathy-resolved as of 8/19/2024  Confusion resolved on 8/18 so suspect hyponatremia played a role in her encephalopathy on admit.     Confusion improving on 8/17 with correction of sodium level.   Patient with confusion on admit and suspect related to metabolic cause and likely related to hyponatremia.   Doubt infectious as no source. Paracentesis done on 8/16 and no evidence of SBP. U/A on admit with no infection  CT scan of head on admit unremarkable.  Patient not hypoxic.  Ammonia mildly elevated at 59 on admit and on Lactulose and Rifaximin to treat and maybe contributing to AMS.         Psychiatric  Anxiety  Chronic and controlled. Avoid benzos in light of her confusion. Continue home Effexor to treat. Discontinue Clonazepam she was on at home on discharge.         Renal/  Acute kidney injury superimposed on CKD  Creatinine stable at 1.6 on 8/19 day of discharge.   Creatinine stable at 1.7 and appears to be back to her baseline function on 8/18.   JACQUE improving on 8/17. Patient with known CKD stage 3a at baseline with baseline creatinine around 1.5. Creatinine 2.2 on admit and went to 2.4 on 8/16 but down to 1.7 on 8/17.   Nephrology consulted and appreciate recs and unlikely due to HRS with patient having elevated blood pressures. Suspect JACQUE related to pre-renal azotemia due to hypovolemia at this time.     Review of Systems   Constitutional: Positive for chills and malaise/fatigue. Negative for diaphoresis, fever, night sweats and weight gain.        Weight stable from 9/2023   HENT:  Positive for congestion, hearing loss and stridor. Negative for nosebleeds and tinnitus.    Eyes:  Positive for blurred vision, discharge and photophobia. Negative for visual disturbance.   Cardiovascular:  Positive for dyspnea on exertion and syncope. Negative for chest pain, claudication, cyanosis, irregular heartbeat, leg swelling, near-syncope, orthopnea, palpitations and paroxysmal nocturnal dyspnea.   Respiratory:  Positive for shortness of breath and snoring (loud). Negative for cough, sleep disturbances due to breathing and wheezing.          [place] score 6 today 11, awaken tired. Never tested   Endocrine: Positive for polydipsia. Negative for polyuria.   Hematologic/Lymphatic: Bruises/bleeds easily.   Skin:  Positive for itching and rash. Negative for color change, flushing, nail changes, poor wound healing and suspicious lesions.   Musculoskeletal:  Positive for back pain, falls (multiple) and myalgias. Negative for arthritis, gout, joint pain, joint swelling, muscle cramps and muscle weakness.   Gastrointestinal:  Positive for abdominal pain, constipation, diarrhea, heartburn, nausea and vomiting. Negative for hematemesis, hematochezia and melena.   Genitourinary:  Positive for urgency.   Neurological:  Positive for aphonia, dizziness, focal weakness, headaches, loss of balance, paresthesias, seizures and tremors. Negative for disturbances in coordination, excessive daytime sleepiness, light-headedness, numbness, vertigo and weakness.   Psychiatric/Behavioral:  Positive for altered mental status, depression and memory loss. Negative for substance abuse. The patient has insomnia and is nervous/anxious.    Allergic/Immunologic: Positive for environmental allergies.        Objective:    Physical Exam  Constitutional:       Appearance: She is well-developed.      Comments: RA O2 sat 100%  Orthostatic VS: sitting 186/73, standing 184/80   HENT:      Head: Normocephalic.   Eyes:      Conjunctiva/sclera: Conjunctivae normal.      Pupils: Pupils are equal, round, and reactive to light.   Neck:      Thyroid: No thyromegaly.      Vascular: No JVD.   Cardiovascular:      Rate and Rhythm: Normal rate and regular rhythm.      Pulses: Intact distal pulses.           Carotid pulses are 1+ on the right side with bruit and 1+ on the left side.       Radial pulses are 1+ on the right side and 1+ on the left side.        Dorsalis pedis pulses are 1+ on the right side and 1+ on the left side.        Posterior tibial pulses are 1+ on the right side and 1+ on the left  "side.      Heart sounds: Murmur heard.      Medium-pitched holosystolic murmur is present with a grade of 2/6.      No friction rub. No gallop.   Pulmonary:      Effort: Pulmonary effort is normal.      Breath sounds: Normal breath sounds. No rales.   Chest:      Chest wall: No tenderness.   Abdominal:      General: Bowel sounds are normal.      Palpations: Abdomen is soft.      Tenderness: There is no abdominal tenderness.      Comments: Waist 33"   Musculoskeletal:         General: Normal range of motion.      Cervical back: Normal range of motion and neck supple.      Right lower leg: No edema.      Left lower leg: No edema.   Lymphadenopathy:      Cervical: No cervical adenopathy.   Skin:     General: Skin is warm and dry.      Findings: No rash.   Neurological:      Mental Status: She is alert and oriented to person, place, and time.      Comments: Fine tremors in all 4 extremities.           Assessment:       1. Aortic atherosclerosis    2. Stage 3a chronic kidney disease    3. Long-term use of immunosuppressant medication    4. Liver fibrosis, transplanted liver    5. Essential hypertension, dx 2010    6. LVH (left ventricular hypertrophy) due to hypertensive disease, without heart failure    7. Chronic fatigue    8. Excessive daytime sleepiness    9. Loud snoring    10. Sedentary lifestyle, onset 2020    11. Hypoalbuminemia    12. Multiple falls    13. BLAIR (dyspnea on exertion), onset 2020    14. S/P liver transplant 9/23/2002 for von Gierke disease    15. SIADH (syndrome of inappropriate ADH production)    16. Other ascites    17. Thrombocytopenia, unspecified    18. Nonrheumatic mitral valve regurgitation    19. LBBB (left bundle branch block)    20. Generalized muscle weakness    21. Hypercholesterolemia    22. Weakness of both lower extremities           Plan:       Elda Doran was seen today for hypertension.    Diagnoses and all orders for this visit:    Aortic atherosclerosis  -     Echo; Future  -     " ramipriL (ALTACE) 5 MG capsule; Take 1 capsule (5 mg total) by mouth once daily.    Stage 3a chronic kidney disease  -     ramipriL (ALTACE) 5 MG capsule; Take 1 capsule (5 mg total) by mouth once daily.    Long-term use of immunosuppressant medication    Liver fibrosis, transplanted liver    Essential hypertension, dx 2010  -     ramipriL (ALTACE) 5 MG capsule; Take 1 capsule (5 mg total) by mouth once daily.    LVH (left ventricular hypertrophy) due to hypertensive disease, without heart failure  -     ramipriL (ALTACE) 5 MG capsule; Take 1 capsule (5 mg total) by mouth once daily.    Chronic fatigue    Excessive daytime sleepiness    Loud snoring    Sedentary lifestyle, onset 2020    Hypoalbuminemia    Multiple falls    BLAIR (dyspnea on exertion), onset 2020    S/P liver transplant 9/23/2002 for von Gierke disease    SIADH (syndrome of inappropriate ADH production)    Other ascites    Thrombocytopenia, unspecified    Nonrheumatic mitral valve regurgitation    LBBB (left bundle branch block)    Generalized muscle weakness    Hypercholesterolemia    Weakness of both lower extremities       - All medical issues reviewed, will restart ramipril 5 mg daily  - Warning signs of MI and stroke given, if symptoms last more than 5 minutes, stop immediately and call 911, then chew 2-4 low-dose ASA (81 mg).   - Info on ANTONINA  - CV status and all medications reviewed, patient acknowledge good understanding.  - Recommend healthy living: healthy diet and regular exercise aiming for fitness, restorative sleep and weight control  - Need good exercise program, 4 key elements: 1. Aerobic (walking, swimming, dancing, jogging, biking, etc, 2. Muscle strengthening / resistance exercise, need to do 2-3 times weekly, 3. Stretching daily, good stretch takes a whole  total minute. 4. Balance exercise daily.   - Encourage activities as much as tolerated. Any activity is better than none!   - Can start with up and down from chair 10 times, 3  times daily and work up to 30 times, 3 times daily over the  next 2 weeks.   - Instruction for Mediterranean diet and heart healthy exercise given.  - Check home blood pressure, 2 days weekly, do 2 readings within 5 minutes in AM and PM, keep log for review. Target resting BP is less than 130/80.   - Highly recommend 30-60 minutes of exercise / activities daily, can have Sunday off, with 2-3 sessions of muscle strengthening weekly. A  would be very helpful.  - Recommend at least annual cardiovascular evaluation in view of patient's significant risk factors. Patient's preference  - Phone review / encourage use of [facility]     Total time spend including review of record prior to face-to-face visit is 50 minutes. Greater than 50% of the time was spent in counseling and coordination of care. The above assessment and plan have been discussed at length. Referring provider's note reviewed. Labs and procedure over the last 6 months reviewed. Problem List updated. Asked to bring in all active medications / pills bottles with next visit. Will send note to referring / PCP.

## 2024-09-16 ENCOUNTER — TELEPHONE (OUTPATIENT)
Dept: TRANSPLANT | Facility: CLINIC | Age: 54
End: 2024-09-16
Payer: MEDICARE

## 2024-09-16 NOTE — TELEPHONE ENCOUNTER
Sent patient message via portal to let her know labs are stable.  No medication changes, next labs due on 10/12/24      ----- Message from Jaya Nielsen MD sent at 9/16/2024  2:25 PM CDT -----  Results reviewed. No action.

## 2024-09-20 DIAGNOSIS — F32.A DEPRESSION, UNSPECIFIED DEPRESSION TYPE: ICD-10-CM

## 2024-09-20 NOTE — TELEPHONE ENCOUNTER
No care due was identified.  [facility] Embedded Care Due Messages. Reference number: [id number].   9/20/2024 4:35:31 PM CDT

## 2024-09-21 NOTE — TELEPHONE ENCOUNTER
Refill Routing Note   Medication(s) are not appropriate for processing by [facility] for the following reason(s):        Required vitals abnormal    [facility] action(s):  Defer               Appointments  past 12m or future 3m with PCP    Date Provider   Last Visit   8/21/2024 David Lorenzo MD   Next Visit   Visit date not found David Lorenzo MD   ED visits in past 90 days: 2        Note composed:12:19 PM 09/21/2024

## 2024-09-22 RX ORDER — VENLAFAXINE HYDROCHLORIDE 150 MG/1
150 CAPSULE, EXTENDED RELEASE ORAL DAILY
Qty: 90 CAPSULE | Refills: 3 | Status: SHIPPED | OUTPATIENT
Start: 2024-09-22

## 2024-10-06 DIAGNOSIS — I11.9 LVH (LEFT VENTRICULAR HYPERTROPHY) DUE TO HYPERTENSIVE DISEASE, WITHOUT HEART FAILURE: Chronic | ICD-10-CM

## 2024-10-06 DIAGNOSIS — I70.0 AORTIC ATHEROSCLEROSIS: ICD-10-CM

## 2024-10-06 DIAGNOSIS — N18.31 STAGE 3A CHRONIC KIDNEY DISEASE: Chronic | ICD-10-CM

## 2024-10-06 DIAGNOSIS — I10 ESSENTIAL HYPERTENSION: Chronic | ICD-10-CM

## 2024-10-07 RX ORDER — RAMIPRIL 5 MG/1
5 CAPSULE ORAL DAILY
Qty: 90 CAPSULE | Refills: 3 | Status: SHIPPED | OUTPATIENT
Start: 2024-10-07 | End: 2024-10-09

## 2024-10-08 ENCOUNTER — TELEPHONE (OUTPATIENT)
Dept: GASTROENTEROLOGY | Facility: CLINIC | Age: 54
End: 2024-10-08
Payer: MEDICARE

## 2024-10-08 DIAGNOSIS — I70.0 AORTIC ATHEROSCLEROSIS: ICD-10-CM

## 2024-10-08 DIAGNOSIS — I10 ESSENTIAL HYPERTENSION: Chronic | ICD-10-CM

## 2024-10-08 DIAGNOSIS — N18.31 STAGE 3A CHRONIC KIDNEY DISEASE: Chronic | ICD-10-CM

## 2024-10-08 DIAGNOSIS — I11.9 LVH (LEFT VENTRICULAR HYPERTROPHY) DUE TO HYPERTENSIVE DISEASE, WITHOUT HEART FAILURE: Chronic | ICD-10-CM

## 2024-10-08 NOTE — TELEPHONE ENCOUNTER
Message routed to Kaity alejandor advise.   ----- Message from Maria T sent at 10/8/2024 10:48 AM CDT -----  Regarding: appt access  Contact: Mr. Hernandez @ [phone]  Pt is needing to be scheduled from a referral for K22.2 (ICD-10-CM) - Esophageal stricture. Please call to advise further. Thank you for all you are doing.

## 2024-10-08 NOTE — TELEPHONE ENCOUNTER
The patient spouse stated you seen this patient in the hospital and you stated you would like to see the patient in clinic after she discharged from the hospital. Please let me know, if ok to schedule her with fellow or you prefer to see this patient? +    The first available fellow clinic appt is 12/11.

## 2024-10-08 NOTE — TELEPHONE ENCOUNTER
Talked to patients  who brings her to her appointments and states that patient is see Dr Jhony James MD at [facility] and this referral to [place] is incorrect. No further issues noted .

## 2024-10-09 ENCOUNTER — PATIENT MESSAGE (OUTPATIENT)
Dept: TRANSPLANT | Facility: CLINIC | Age: 54
End: 2024-10-09
Payer: MEDICARE

## 2024-10-09 RX ORDER — RAMIPRIL 5 MG/1
CAPSULE ORAL
Qty: 90 CAPSULE | Refills: 3 | Status: SHIPPED | OUTPATIENT
Start: 2024-10-09

## 2024-10-09 NOTE — TELEPHONE ENCOUNTER
No care due was identified.  [facility] Embedded Care Due Messages. Reference number: [id number].   10/08/2024 11:31:19 PM CDT

## 2024-10-09 NOTE — TELEPHONE ENCOUNTER
Appointment rescheduled   Refill Routing Note   Medication(s) are not appropriate for processing by [facility] for the following reason(s):        Non-participating provider  Previous encounter only sent as PRINT. Will pend the med as NORMAL for pharmacy to receive.       [facility] action(s):  Route               Appointments  past 12m or future 3m with PCP    Date Provider   Last Visit   8/21/2024 David Lorenzo MD   Next Visit   Visit date not found David Lorenzo MD   ED visits in past 90 days: 2        Note composed:1:09 PM 10/09/2024

## 2024-10-12 ENCOUNTER — LAB VISIT (OUTPATIENT)
Dept: LAB | Facility: HOSPITAL | Age: 54
End: 2024-10-12
Attending: INTERNAL MEDICINE
Payer: MEDICARE

## 2024-10-12 DIAGNOSIS — Z94.4 LIVER REPLACED BY TRANSPLANT: ICD-10-CM

## 2024-10-12 LAB
ALBUMIN SERPL BCP-MCNC: 2.2 G/DL (ref 3.5–5.2)
ALP SERPL-CCNC: 526 U/L (ref 55–135)
ALT SERPL W/O P-5'-P-CCNC: 44 U/L (ref 10–44)
ANION GAP SERPL CALC-SCNC: 8 MMOL/L (ref 8–16)
AST SERPL-CCNC: 80 U/L (ref 10–40)
BASOPHILS # BLD AUTO: 0.02 K/UL (ref 0–0.2)
BASOPHILS NFR BLD: 0.7 % (ref 0–1.9)
BILIRUB SERPL-MCNC: 3.7 MG/DL (ref 0.1–1)
BUN SERPL-MCNC: 25 MG/DL (ref 6–20)
CALCIUM SERPL-MCNC: 9.1 MG/DL (ref 8.7–10.5)
CHLORIDE SERPL-SCNC: 104 MMOL/L (ref 95–110)
CO2 SERPL-SCNC: 22 MMOL/L (ref 23–29)
CREAT SERPL-MCNC: 1.1 MG/DL (ref 0.5–1.4)
DIFFERENTIAL METHOD BLD: ABNORMAL
EOSINOPHIL # BLD AUTO: 0.2 K/UL (ref 0–0.5)
EOSINOPHIL NFR BLD: 5.6 % (ref 0–8)
ERYTHROCYTE [DISTWIDTH] IN BLOOD BY AUTOMATED COUNT: 15.2 % (ref 11.5–14.5)
EST. GFR  (NO RACE VARIABLE): 59 ML/MIN/1.73 M^2
GLUCOSE SERPL-MCNC: 79 MG/DL (ref 70–110)
HCT VFR BLD AUTO: 27.6 % (ref 37–48.5)
HGB BLD-MCNC: 9.3 G/DL (ref 12–16)
IMM GRANULOCYTES # BLD AUTO: 0.01 K/UL (ref 0–0.04)
IMM GRANULOCYTES NFR BLD AUTO: 0.3 % (ref 0–0.5)
LYMPHOCYTES # BLD AUTO: 0.5 K/UL (ref 1–4.8)
LYMPHOCYTES NFR BLD: 17.6 % (ref 18–48)
MCH RBC QN AUTO: 31.5 PG (ref 27–31)
MCHC RBC AUTO-ENTMCNC: 33.7 G/DL (ref 32–36)
MCV RBC AUTO: 94 FL (ref 82–98)
MONOCYTES # BLD AUTO: 0.4 K/UL (ref 0.3–1)
MONOCYTES NFR BLD: 12 % (ref 4–15)
NEUTROPHILS # BLD AUTO: 1.9 K/UL (ref 1.8–7.7)
NEUTROPHILS NFR BLD: 63.8 % (ref 38–73)
NRBC BLD-RTO: 0 /100 WBC
PLATELET # BLD AUTO: 74 K/UL (ref 150–450)
PMV BLD AUTO: 11.2 FL (ref 9.2–12.9)
POTASSIUM SERPL-SCNC: 4.5 MMOL/L (ref 3.5–5.1)
PROT SERPL-MCNC: 5.5 G/DL (ref 6–8.4)
RBC # BLD AUTO: 2.95 M/UL (ref 4–5.4)
SODIUM SERPL-SCNC: 134 MMOL/L (ref 136–145)
WBC # BLD AUTO: 3.01 K/UL (ref 3.9–12.7)

## 2024-10-12 PROCEDURE — 85025 COMPLETE CBC W/AUTO DIFF WBC: CPT | Performed by: INTERNAL MEDICINE

## 2024-10-12 PROCEDURE — 36415 COLL VENOUS BLD VENIPUNCTURE: CPT | Mod: PO | Performed by: INTERNAL MEDICINE

## 2024-10-12 PROCEDURE — 80197 ASSAY OF TACROLIMUS: CPT | Performed by: INTERNAL MEDICINE

## 2024-10-12 PROCEDURE — 80053 COMPREHEN METABOLIC PANEL: CPT | Performed by: INTERNAL MEDICINE

## 2024-10-13 ENCOUNTER — PATIENT MESSAGE (OUTPATIENT)
Dept: FAMILY MEDICINE | Facility: CLINIC | Age: 54
End: 2024-10-13
Payer: MEDICARE

## 2024-10-13 DIAGNOSIS — R13.19 ESOPHAGEAL DYSPHAGIA: Primary | ICD-10-CM

## 2024-10-13 DIAGNOSIS — Z94.4 S/P LIVER TRANSPLANT: Chronic | ICD-10-CM

## 2024-10-13 LAB — TACROLIMUS BLD-MCNC: 7.5 NG/ML (ref 5–15)

## 2024-10-13 NOTE — TELEPHONE ENCOUNTER
No care due was identified.  [facility] Embedded Care Due Messages. Reference number: [id number].   10/13/2024 6:40:33 PM CDT

## 2024-10-14 ENCOUNTER — PATIENT MESSAGE (OUTPATIENT)
Dept: TRANSPLANT | Facility: CLINIC | Age: 54
End: 2024-10-14
Payer: MEDICARE

## 2024-10-14 ENCOUNTER — TELEPHONE (OUTPATIENT)
Dept: TRANSPLANT | Facility: CLINIC | Age: 54
End: 2024-10-14
Payer: MEDICARE

## 2024-10-14 RX ORDER — PANTOPRAZOLE SODIUM 20 MG/1
20 TABLET, DELAYED RELEASE ORAL DAILY
Qty: 90 TABLET | Refills: 3 | Status: SHIPPED | OUTPATIENT
Start: 2024-10-14 | End: 2025-10-14

## 2024-10-14 RX ORDER — LANOLIN ALCOHOL/MO/W.PET/CERES
CREAM (GRAM) TOPICAL
Qty: 180 TABLET | Refills: 3 | Status: SHIPPED | OUTPATIENT
Start: 2024-10-14

## 2024-10-14 NOTE — TELEPHONE ENCOUNTER
No care due was identified.  [facility] Embedded Care Due Messages. Reference number: [id number].   10/14/2024 10:30:25 AM CDT

## 2024-10-14 NOTE — TELEPHONE ENCOUNTER
Sent patient message via portal to let her know labs are stable.  No medication changes, next labs due on 11/16/24      ----- Message from Jaya Nielsen MD sent at 10/14/2024 11:49 AM CDT -----  Results reviewed. No action.

## 2024-10-16 DIAGNOSIS — E83.42 HYPOMAGNESEMIA: Primary | ICD-10-CM

## 2024-10-25 ENCOUNTER — PATIENT MESSAGE (OUTPATIENT)
Dept: TRANSPLANT | Facility: CLINIC | Age: 54
End: 2024-10-25
Payer: MEDICARE

## 2024-10-28 PROBLEM — N17.9 ACUTE KIDNEY INJURY SUPERIMPOSED ON CHRONIC KIDNEY DISEASE: Status: RESOLVED | Noted: 2024-07-28 | Resolved: 2024-10-28

## 2024-10-28 PROBLEM — N18.9 ACUTE KIDNEY INJURY SUPERIMPOSED ON CHRONIC KIDNEY DISEASE: Status: RESOLVED | Noted: 2024-07-28 | Resolved: 2024-10-28

## 2024-11-01 DIAGNOSIS — R18.8 OTHER ASCITES: Primary | ICD-10-CM

## 2024-11-01 RX ORDER — FUROSEMIDE 20 MG/1
20 TABLET ORAL DAILY
Qty: 30 TABLET | Refills: 11 | Status: SHIPPED | OUTPATIENT
Start: 2024-11-01 | End: 2025-11-01

## 2024-11-01 RX ORDER — SPIRONOLACTONE 50 MG/1
50 TABLET, FILM COATED ORAL DAILY
Qty: 30 TABLET | Refills: 11 | Status: SHIPPED | OUTPATIENT
Start: 2024-11-01 | End: 2025-11-01

## 2024-11-05 NOTE — ASSESSMENT & PLAN NOTE
- Hgb stable at 7.2 on day of discharge.   - Improved Hgb 7.3 on 8/18 after transfused 1 unit of PRBCs on 8/17 for Hgb 7.   - Anemia is likely due to chronic blood loss resulting in iron deficiency anemia and chronic disease due to Chronic liver disease and CKD. Hgb 8.2 on admit and close to baseline level but down to 7 on 8/17. Likely combination of her chronic anemia and hemodilution as has received IVF's and IV Albumin in hospital for hyponatremia and JACQUE. No clinical signs of active bleeding. Most recent hemoglobin and hematocrit are listed below.  Recent Labs     08/17/24  0440 08/18/24  0451 08/19/24  0501   HGB 7.0* 7.3* 7.2*   HCT 20.7* 21.7* 21.6*       Plan  - Transfuse PRBC if patient becomes hemodynamically unstable, symptomatic or H/H drops below 7/21.  - Patient has not received any PRBC transfusions to date  - Patient's anemia is currently stable on discharge.      Department of Anesthesiology  Preprocedure Note       Name:  Tonia Yo   Age:  58 y.o.  :  1966                                          MRN:  [id number]         Date:  2024      Surgeon: Surgeon(s):  Gulshan Moss MD    Procedure: Procedure(s):  COLONOSCOPY    Medications prior to admission:   Prior to Admission medications    Medication Sig Start Date End Date Taking? Authorizing Provider   meloxicam (MOBIC) 15 MG tablet Take 1 tablet by mouth daily 24  Yes Ana Paula Camp MD   nitroGLYCERIN (NITROSTAT) 0.4 MG SL tablet DISSOLVE ONE TABLET UNDER THE TONGUE EVERY 5 MINUTES AS NEEDED FOR CHEST PAIN-CALL 911 IF CHEST PAIN NOT IMPROVED AFTER THE 1ST TABLET OR IF PAIN NOT COMPLETELY RELIEVED AFTER 3 TABLETS 22  Yes Ana Paula Camp MD   ranolazine (RANEXA) 500 MG extended release tablet Take 2 tablets by mouth 2 times daily    Ana Paula Camp MD   furosemide (LASIX) 20 MG tablet Take 0.5 tablets by mouth daily    Ana Paula Camp MD   clopidogrel (PLAVIX) 75 MG tablet TAKE ONE TABLET BY MOUTH EVERY DAY 22   Ana Paula Camp MD   diphenhydrAMINE (SOMINEX) 25 MG tablet TAKE BY MOUTH 2 TABLETS 1 HOUR PRIOR TO CATH WITH LAST DOSE OF PREDNISONE.  Patient not taking: Reported on 8/15/2024 11/14/22   Ana Paula Camp MD   famotidine (PEPCID) 10 MG tablet TAKE ONE TABLET BY MOUTH TWICE A DAY 3/5/23   Ana Paula Camp MD   isosorbide mononitrate (IMDUR) 30 MG extended release tablet Take 4 tablets by mouth daily 22   Ana Paula Camp MD   metoprolol tartrate (LOPRESSOR) 25 MG tablet TAKE 1 TABLET BY MOUTH TWICE DAILY 22   Ana Paula Camp MD   Semaglutide,0.25 or 0.5MG/DOS, 2 MG/1.5ML SOPN Inject 0.5 mg into the skin once a week 22   Ana Paula Camp MD   albuterol sulfate HFA (PROVENTIL;VENTOLIN;PROAIR) 108 (90 Base) MCG/ACT inhaler INHALE 2 PUFFS BY INHALATION EVERY 4 HOURS AS NEEDED FOR DIFFICULTY BREATHING 21

## 2024-11-07 DIAGNOSIS — R11.0 NAUSEA: ICD-10-CM

## 2024-11-07 NOTE — TELEPHONE ENCOUNTER
No care due was identified.  [facility] Embedded Care Due Messages. Reference number: [id number].   11/07/2024 2:27:27 PM CST

## 2024-11-08 NOTE — TELEPHONE ENCOUNTER
Refill Routing Note   Medication(s) are not appropriate for processing by [facility] for the following reason(s):        Outside of protocol    [facility] action(s):  Route               Appointments  past 12m or future 3m with PCP    Date Provider   Last Visit   8/21/2024 David Lorenzo MD   Next Visit   Visit date not found David Lorenzo MD   ED visits in past 90 days: 0        Note composed:9:01 PM 11/07/2024

## 2024-11-10 RX ORDER — PROMETHAZINE HYDROCHLORIDE 25 MG/1
25 TABLET ORAL EVERY 6 HOURS PRN
Qty: 30 TABLET | Refills: 0 | Status: SHIPPED | OUTPATIENT
Start: 2024-11-10 | End: 2024-12-10

## 2024-11-18 DIAGNOSIS — Z94.4 LIVER REPLACED BY TRANSPLANT: ICD-10-CM

## 2024-11-18 DIAGNOSIS — Z94.4 S/P LIVER TRANSPLANT: Chronic | ICD-10-CM

## 2024-11-18 DIAGNOSIS — R18.8 OTHER ASCITES: ICD-10-CM

## 2024-11-19 DIAGNOSIS — Z94.4 S/P LIVER TRANSPLANT: Chronic | ICD-10-CM

## 2024-11-19 DIAGNOSIS — Z94.4 LIVER REPLACED BY TRANSPLANT: ICD-10-CM

## 2024-11-19 DIAGNOSIS — R18.8 OTHER ASCITES: ICD-10-CM

## 2024-11-19 RX ORDER — TACROLIMUS 0.5 MG/1
0.5 CAPSULE ORAL EVERY 12 HOURS
Qty: 180 CAPSULE | Refills: 3 | Status: SHIPPED | OUTPATIENT
Start: 2024-11-19 | End: 2024-11-19 | Stop reason: SDUPTHER

## 2024-11-19 RX ORDER — TACROLIMUS 0.5 MG/1
0.5 CAPSULE ORAL EVERY 12 HOURS
Qty: 180 CAPSULE | Refills: 3 | Status: SHIPPED | OUTPATIENT
Start: 2024-11-19

## 2024-11-23 ENCOUNTER — LAB VISIT (OUTPATIENT)
Dept: LAB | Facility: HOSPITAL | Age: 54
End: 2024-11-23
Attending: INTERNAL MEDICINE
Payer: MEDICARE

## 2024-11-23 DIAGNOSIS — Z94.4 LIVER REPLACED BY TRANSPLANT: ICD-10-CM

## 2024-11-23 DIAGNOSIS — E83.42 HYPOMAGNESEMIA: ICD-10-CM

## 2024-11-23 LAB
ALBUMIN SERPL BCP-MCNC: 2.1 G/DL (ref 3.5–5.2)
ALP SERPL-CCNC: 462 U/L (ref 40–150)
ALT SERPL W/O P-5'-P-CCNC: 44 U/L (ref 10–44)
ANION GAP SERPL CALC-SCNC: 7 MMOL/L (ref 8–16)
AST SERPL-CCNC: 91 U/L (ref 10–40)
BASOPHILS # BLD AUTO: 0.04 K/UL (ref 0–0.2)
BASOPHILS NFR BLD: 1.3 % (ref 0–1.9)
BILIRUB SERPL-MCNC: 4 MG/DL (ref 0.1–1)
BUN SERPL-MCNC: 16 MG/DL (ref 6–20)
CALCIUM SERPL-MCNC: 8.8 MG/DL (ref 8.7–10.5)
CHLORIDE SERPL-SCNC: 105 MMOL/L (ref 95–110)
CO2 SERPL-SCNC: 25 MMOL/L (ref 23–29)
CREAT SERPL-MCNC: 1 MG/DL (ref 0.5–1.4)
DIFFERENTIAL METHOD BLD: ABNORMAL
EOSINOPHIL # BLD AUTO: 0.2 K/UL (ref 0–0.5)
EOSINOPHIL NFR BLD: 6.9 % (ref 0–8)
ERYTHROCYTE [DISTWIDTH] IN BLOOD BY AUTOMATED COUNT: 14.7 % (ref 11.5–14.5)
EST. GFR  (NO RACE VARIABLE): >60 ML/MIN/1.73 M^2
GLUCOSE SERPL-MCNC: 81 MG/DL (ref 70–110)
HCT VFR BLD AUTO: 30.2 % (ref 37–48.5)
HGB BLD-MCNC: 10.1 G/DL (ref 12–16)
IMM GRANULOCYTES # BLD AUTO: 0.01 K/UL (ref 0–0.04)
IMM GRANULOCYTES NFR BLD AUTO: 0.3 % (ref 0–0.5)
INR PPP: 1.3 (ref 0.8–1.2)
LYMPHOCYTES # BLD AUTO: 0.7 K/UL (ref 1–4.8)
LYMPHOCYTES NFR BLD: 22.3 % (ref 18–48)
MAGNESIUM SERPL-MCNC: 1.8 MG/DL (ref 1.6–2.6)
MCH RBC QN AUTO: 31.1 PG (ref 27–31)
MCHC RBC AUTO-ENTMCNC: 33.4 G/DL (ref 32–36)
MCV RBC AUTO: 93 FL (ref 82–98)
MONOCYTES # BLD AUTO: 0.3 K/UL (ref 0.3–1)
MONOCYTES NFR BLD: 10.3 % (ref 4–15)
NEUTROPHILS # BLD AUTO: 1.9 K/UL (ref 1.8–7.7)
NEUTROPHILS NFR BLD: 58.9 % (ref 38–73)
NRBC BLD-RTO: 0 /100 WBC
PLATELET # BLD AUTO: 86 K/UL (ref 150–450)
PMV BLD AUTO: 11.9 FL (ref 9.2–12.9)
POTASSIUM SERPL-SCNC: 3.8 MMOL/L (ref 3.5–5.1)
PROT SERPL-MCNC: 6.1 G/DL (ref 6–8.4)
PROTHROMBIN TIME: 14.1 SEC (ref 9–12.5)
RBC # BLD AUTO: 3.25 M/UL (ref 4–5.4)
SODIUM SERPL-SCNC: 137 MMOL/L (ref 136–145)
WBC # BLD AUTO: 3.19 K/UL (ref 3.9–12.7)

## 2024-11-23 PROCEDURE — 85025 COMPLETE CBC W/AUTO DIFF WBC: CPT | Performed by: INTERNAL MEDICINE

## 2024-11-23 PROCEDURE — 36415 COLL VENOUS BLD VENIPUNCTURE: CPT | Mod: PO | Performed by: INTERNAL MEDICINE

## 2024-11-23 PROCEDURE — 80197 ASSAY OF TACROLIMUS: CPT | Performed by: INTERNAL MEDICINE

## 2024-11-23 PROCEDURE — 85610 PROTHROMBIN TIME: CPT | Performed by: INTERNAL MEDICINE

## 2024-11-23 PROCEDURE — 83735 ASSAY OF MAGNESIUM: CPT | Performed by: INTERNAL MEDICINE

## 2024-11-23 PROCEDURE — 80053 COMPREHEN METABOLIC PANEL: CPT | Performed by: INTERNAL MEDICINE

## 2024-11-24 LAB — TACROLIMUS BLD-MCNC: 7.4 NG/ML (ref 5–15)

## 2024-11-25 ENCOUNTER — TELEPHONE (OUTPATIENT)
Dept: TRANSPLANT | Facility: CLINIC | Age: 54
End: 2024-11-25
Payer: MEDICARE

## 2024-11-25 NOTE — TELEPHONE ENCOUNTER
Sent patient message via portal to let her know labs are stable.  No medication changes, next labs due on 12/21/24      ----- Message from Jaya Nielsen MD sent at 11/24/2024  9:48 AM CST -----  Results reviewed. No action.

## 2024-12-03 DIAGNOSIS — E83.42 HYPOMAGNESEMIA: Primary | ICD-10-CM

## 2024-12-28 ENCOUNTER — TELEPHONE (OUTPATIENT)
Dept: TRANSPLANT | Facility: CLINIC | Age: 54
End: 2024-12-28
Payer: MEDICARE

## 2024-12-28 ENCOUNTER — LAB VISIT (OUTPATIENT)
Dept: LAB | Facility: HOSPITAL | Age: 54
End: 2024-12-28
Attending: INTERNAL MEDICINE
Payer: MEDICARE

## 2024-12-28 DIAGNOSIS — E83.42 HYPOMAGNESEMIA: ICD-10-CM

## 2024-12-28 DIAGNOSIS — Z94.4 LIVER REPLACED BY TRANSPLANT: ICD-10-CM

## 2024-12-28 LAB
ALBUMIN SERPL BCP-MCNC: 2.2 G/DL (ref 3.5–5.2)
ALP SERPL-CCNC: 380 U/L (ref 40–150)
ALT SERPL W/O P-5'-P-CCNC: 42 U/L (ref 10–44)
ANION GAP SERPL CALC-SCNC: 6 MMOL/L (ref 8–16)
AST SERPL-CCNC: 69 U/L (ref 10–40)
BASOPHILS # BLD AUTO: 0.01 K/UL (ref 0–0.2)
BASOPHILS NFR BLD: 0.3 % (ref 0–1.9)
BILIRUB SERPL-MCNC: 4.7 MG/DL (ref 0.1–1)
BUN SERPL-MCNC: 17 MG/DL (ref 6–20)
CALCIUM SERPL-MCNC: 8.2 MG/DL (ref 8.7–10.5)
CHLORIDE SERPL-SCNC: 91 MMOL/L (ref 95–110)
CO2 SERPL-SCNC: 21 MMOL/L (ref 23–29)
CREAT SERPL-MCNC: 1.1 MG/DL (ref 0.5–1.4)
DIFFERENTIAL METHOD BLD: ABNORMAL
EOSINOPHIL # BLD AUTO: 0.2 K/UL (ref 0–0.5)
EOSINOPHIL NFR BLD: 5.1 % (ref 0–8)
ERYTHROCYTE [DISTWIDTH] IN BLOOD BY AUTOMATED COUNT: 14.4 % (ref 11.5–14.5)
EST. GFR  (NO RACE VARIABLE): 59 ML/MIN/1.73 M^2
GLUCOSE SERPL-MCNC: 96 MG/DL (ref 70–110)
HCT VFR BLD AUTO: 27.4 % (ref 37–48.5)
HGB BLD-MCNC: 9.6 G/DL (ref 12–16)
IMM GRANULOCYTES # BLD AUTO: 0.02 K/UL (ref 0–0.04)
IMM GRANULOCYTES NFR BLD AUTO: 0.6 % (ref 0–0.5)
INR PPP: 1.4 (ref 0.8–1.2)
LYMPHOCYTES # BLD AUTO: 0.5 K/UL (ref 1–4.8)
LYMPHOCYTES NFR BLD: 13.9 % (ref 18–48)
MAGNESIUM SERPL-MCNC: 1.6 MG/DL (ref 1.6–2.6)
MCH RBC QN AUTO: 30.4 PG (ref 27–31)
MCHC RBC AUTO-ENTMCNC: 35 G/DL (ref 32–36)
MCV RBC AUTO: 87 FL (ref 82–98)
MONOCYTES # BLD AUTO: 0.5 K/UL (ref 0.3–1)
MONOCYTES NFR BLD: 13.6 % (ref 4–15)
NEUTROPHILS # BLD AUTO: 2.2 K/UL (ref 1.8–7.7)
NEUTROPHILS NFR BLD: 66.5 % (ref 38–73)
NRBC BLD-RTO: 0 /100 WBC
PLATELET # BLD AUTO: 89 K/UL (ref 150–450)
PMV BLD AUTO: 10.8 FL (ref 9.2–12.9)
POTASSIUM SERPL-SCNC: 4.7 MMOL/L (ref 3.5–5.1)
PROT SERPL-MCNC: 6.1 G/DL (ref 6–8.4)
PROTHROMBIN TIME: 14.6 SEC (ref 9–12.5)
RBC # BLD AUTO: 3.16 M/UL (ref 4–5.4)
SODIUM SERPL-SCNC: 118 MMOL/L (ref 136–145)
WBC # BLD AUTO: 3.31 K/UL (ref 3.9–12.7)

## 2024-12-28 PROCEDURE — 36415 COLL VENOUS BLD VENIPUNCTURE: CPT | Mod: PO | Performed by: INTERNAL MEDICINE

## 2024-12-28 PROCEDURE — 85025 COMPLETE CBC W/AUTO DIFF WBC: CPT | Performed by: INTERNAL MEDICINE

## 2024-12-28 PROCEDURE — 80053 COMPREHEN METABOLIC PANEL: CPT | Performed by: INTERNAL MEDICINE

## 2024-12-28 PROCEDURE — 83735 ASSAY OF MAGNESIUM: CPT | Performed by: INTERNAL MEDICINE

## 2024-12-28 PROCEDURE — 85610 PROTHROMBIN TIME: CPT | Performed by: INTERNAL MEDICINE

## 2024-12-28 PROCEDURE — 80197 ASSAY OF TACROLIMUS: CPT | Performed by: INTERNAL MEDICINE

## 2024-12-28 NOTE — TELEPHONE ENCOUNTER
Received call from Dr. Nielsen re: critical result (sodium level)from labs drawn today.  Pt was not instructed to have labs today.  Last labs were scheduled on 12/28.  Per Epic documentation, Dr. Miguel, was notified at 4:06 pm.  Per Dr. Nielsen, pt needs to report to the ER as soon as possible and request coordinator notify pt.  Called pt to advise.  No answer.  Voice message left instructing pt to report to ER as soon as possible due to abnormal labs(sodium level), identified immediate assessment needed, and call [facility] at [phone] for further questions or concerns.

## 2024-12-28 NOTE — TELEPHONE ENCOUNTER
Received return call from pt's .  Notified of critically low sodium and MD instruction to report to nearest ER.  Pt's  reports he will notify the pt.  Message routed to Dr. Nielsen.

## 2024-12-29 ENCOUNTER — PATIENT MESSAGE (OUTPATIENT)
Dept: FAMILY MEDICINE | Facility: CLINIC | Age: 54
End: 2024-12-29
Payer: MEDICARE

## 2024-12-29 ENCOUNTER — HOSPITAL ENCOUNTER (INPATIENT)
Facility: HOSPITAL | Age: 54
LOS: 7 days | Discharge: HOME | DRG: 641 | End: 2025-01-05
Attending: EMERGENCY MEDICINE | Admitting: EMERGENCY MEDICINE
Payer: MEDICARE

## 2024-12-29 DIAGNOSIS — R11.0 NAUSEA: ICD-10-CM

## 2024-12-29 DIAGNOSIS — E87.1 HYPONATREMIA: Primary | ICD-10-CM

## 2024-12-29 DIAGNOSIS — K90.49 FOOD INTOLERANCE IN ADULT: ICD-10-CM

## 2024-12-29 DIAGNOSIS — R07.9 CHEST PAIN: ICD-10-CM

## 2024-12-29 DIAGNOSIS — R18.8 OTHER ASCITES: ICD-10-CM

## 2024-12-29 LAB
ALBUMIN SERPL BCP-MCNC: 2.4 G/DL (ref 3.5–5.2)
ALP SERPL-CCNC: 438 U/L (ref 40–150)
ALT SERPL W/O P-5'-P-CCNC: 49 U/L (ref 10–44)
ANION GAP SERPL CALC-SCNC: 10 MMOL/L (ref 8–16)
ANION GAP SERPL CALC-SCNC: 5 MMOL/L (ref 8–16)
AST SERPL-CCNC: 97 U/L (ref 10–40)
BASOPHILS # BLD AUTO: 0.03 K/UL (ref 0–0.2)
BASOPHILS NFR BLD: 0.9 % (ref 0–1.9)
BILIRUB SERPL-MCNC: 5.3 MG/DL (ref 0.1–1)
BUN SERPL-MCNC: 16 MG/DL (ref 6–20)
BUN SERPL-MCNC: 17 MG/DL (ref 6–20)
BUN SERPL-MCNC: 20 MG/DL (ref 6–30)
CALCIUM SERPL-MCNC: 8.4 MG/DL (ref 8.7–10.5)
CALCIUM SERPL-MCNC: 8.7 MG/DL (ref 8.7–10.5)
CHLORIDE SERPL-SCNC: 92 MMOL/L (ref 95–110)
CHLORIDE SERPL-SCNC: 93 MMOL/L (ref 95–110)
CHLORIDE SERPL-SCNC: 96 MMOL/L (ref 95–110)
CO2 SERPL-SCNC: 18 MMOL/L (ref 23–29)
CO2 SERPL-SCNC: 20 MMOL/L (ref 23–29)
CREAT SERPL-MCNC: 1.2 MG/DL (ref 0.5–1.4)
CREAT SERPL-MCNC: 1.2 MG/DL (ref 0.5–1.4)
CREAT SERPL-MCNC: 1.4 MG/DL (ref 0.5–1.4)
DIFFERENTIAL METHOD BLD: ABNORMAL
EOSINOPHIL # BLD AUTO: 0.2 K/UL (ref 0–0.5)
EOSINOPHIL NFR BLD: 4.9 % (ref 0–8)
ERYTHROCYTE [DISTWIDTH] IN BLOOD BY AUTOMATED COUNT: 14.6 % (ref 11.5–14.5)
EST. GFR  (NO RACE VARIABLE): 53.1 ML/MIN/1.73 M^2
EST. GFR  (NO RACE VARIABLE): 53.1 ML/MIN/1.73 M^2
GLUCOSE SERPL-MCNC: 105 MG/DL (ref 70–110)
GLUCOSE SERPL-MCNC: 81 MG/DL (ref 70–110)
GLUCOSE SERPL-MCNC: 81 MG/DL (ref 70–110)
HCT VFR BLD AUTO: 28.6 % (ref 37–48.5)
HCT VFR BLD CALC: 30 %PCV (ref 36–54)
HGB BLD-MCNC: 9.8 G/DL (ref 12–16)
IMM GRANULOCYTES # BLD AUTO: 0.01 K/UL (ref 0–0.04)
IMM GRANULOCYTES NFR BLD AUTO: 0.3 % (ref 0–0.5)
INR PPP: 1.3 (ref 0.8–1.2)
LIPASE SERPL-CCNC: 23 U/L (ref 4–60)
LYMPHOCYTES # BLD AUTO: 0.7 K/UL (ref 1–4.8)
LYMPHOCYTES NFR BLD: 21.2 % (ref 18–48)
MAGNESIUM SERPL-MCNC: 1.8 MG/DL (ref 1.6–2.6)
MCH RBC QN AUTO: 29.9 PG (ref 27–31)
MCHC RBC AUTO-ENTMCNC: 34.3 G/DL (ref 32–36)
MCV RBC AUTO: 87 FL (ref 82–98)
MONOCYTES # BLD AUTO: 0.6 K/UL (ref 0.3–1)
MONOCYTES NFR BLD: 16.9 % (ref 4–15)
NEUTROPHILS # BLD AUTO: 2 K/UL (ref 1.8–7.7)
NEUTROPHILS NFR BLD: 55.8 % (ref 38–73)
NRBC BLD-RTO: 0 /100 WBC
OHS QRS DURATION: 148 MS
OHS QTC CALCULATION: 475 MS
OSMOLALITY SERPL: 262 MOSM/KG (ref 275–295)
PLATELET # BLD AUTO: 93 K/UL (ref 150–450)
PMV BLD AUTO: 10.7 FL (ref 9.2–12.9)
POC IONIZED CALCIUM: 1.09 MMOL/L (ref 1.06–1.42)
POC TCO2 (MEASURED): 22 MMOL/L (ref 23–29)
POTASSIUM BLD-SCNC: 5.2 MMOL/L (ref 3.5–5.1)
POTASSIUM SERPL-SCNC: 4.7 MMOL/L (ref 3.5–5.1)
POTASSIUM SERPL-SCNC: 5.1 MMOL/L (ref 3.5–5.1)
PROT SERPL-MCNC: 7 G/DL (ref 6–8.4)
PROTHROMBIN TIME: 14.4 SEC (ref 9–12.5)
RBC # BLD AUTO: 3.28 M/UL (ref 4–5.4)
SAMPLE: ABNORMAL
SODIUM BLD-SCNC: 122 MMOL/L (ref 136–145)
SODIUM SERPL-SCNC: 121 MMOL/L (ref 136–145)
SODIUM SERPL-SCNC: 121 MMOL/L (ref 136–145)
TACROLIMUS BLD-MCNC: 7.8 NG/ML (ref 5–15)
TACROLIMUS BLD-MCNC: 8.2 NG/ML (ref 5–15)
WBC # BLD AUTO: 3.49 K/UL (ref 3.9–12.7)

## 2024-12-29 PROCEDURE — 96374 THER/PROPH/DIAG INJ IV PUSH: CPT

## 2024-12-29 PROCEDURE — 80047 BASIC METABLC PNL IONIZED CA: CPT

## 2024-12-29 PROCEDURE — 93010 ELECTROCARDIOGRAM REPORT: CPT | Mod: ,,,

## 2024-12-29 PROCEDURE — 25000003 PHARM REV CODE 250

## 2024-12-29 PROCEDURE — 85025 COMPLETE CBC W/AUTO DIFF WBC: CPT | Performed by: EMERGENCY MEDICINE

## 2024-12-29 PROCEDURE — 63600175 PHARM REV CODE 636 W HCPCS

## 2024-12-29 PROCEDURE — 85610 PROTHROMBIN TIME: CPT | Performed by: EMERGENCY MEDICINE

## 2024-12-29 PROCEDURE — 63600175 PHARM REV CODE 636 W HCPCS: Performed by: EMERGENCY MEDICINE

## 2024-12-29 PROCEDURE — 99285 EMERGENCY DEPT VISIT HI MDM: CPT | Mod: 25

## 2024-12-29 PROCEDURE — 20600001 HC STEP DOWN PRIVATE ROOM

## 2024-12-29 PROCEDURE — 93005 ELECTROCARDIOGRAM TRACING: CPT

## 2024-12-29 PROCEDURE — 80197 ASSAY OF TACROLIMUS: CPT | Performed by: EMERGENCY MEDICINE

## 2024-12-29 PROCEDURE — 83930 ASSAY OF BLOOD OSMOLALITY: CPT | Performed by: EMERGENCY MEDICINE

## 2024-12-29 PROCEDURE — 83690 ASSAY OF LIPASE: CPT | Performed by: EMERGENCY MEDICINE

## 2024-12-29 PROCEDURE — 80048 BASIC METABOLIC PNL TOTAL CA: CPT

## 2024-12-29 PROCEDURE — 36415 COLL VENOUS BLD VENIPUNCTURE: CPT

## 2024-12-29 PROCEDURE — 80053 COMPREHEN METABOLIC PANEL: CPT | Performed by: EMERGENCY MEDICINE

## 2024-12-29 PROCEDURE — 83735 ASSAY OF MAGNESIUM: CPT | Performed by: EMERGENCY MEDICINE

## 2024-12-29 RX ORDER — IBUPROFEN 200 MG
16 TABLET ORAL
Status: DISCONTINUED | OUTPATIENT
Start: 2024-12-29 | End: 2025-01-06 | Stop reason: HOSPADM

## 2024-12-29 RX ORDER — GLUCAGON 1 MG
1 KIT INJECTION
Status: DISCONTINUED | OUTPATIENT
Start: 2024-12-29 | End: 2025-01-06 | Stop reason: HOSPADM

## 2024-12-29 RX ORDER — HEPARIN SODIUM 5000 [USP'U]/ML
5000 INJECTION, SOLUTION INTRAVENOUS; SUBCUTANEOUS EVERY 8 HOURS
Status: DISCONTINUED | OUTPATIENT
Start: 2024-12-29 | End: 2025-01-06 | Stop reason: HOSPADM

## 2024-12-29 RX ORDER — SODIUM CHLORIDE 0.9 % (FLUSH) 0.9 %
10 SYRINGE (ML) INJECTION EVERY 12 HOURS PRN
Status: DISCONTINUED | OUTPATIENT
Start: 2024-12-29 | End: 2025-01-06 | Stop reason: HOSPADM

## 2024-12-29 RX ORDER — IBUPROFEN 200 MG
24 TABLET ORAL
Status: DISCONTINUED | OUTPATIENT
Start: 2024-12-29 | End: 2025-01-06 | Stop reason: HOSPADM

## 2024-12-29 RX ORDER — HYDROCORTISONE 25 MG/G
CREAM TOPICAL 2 TIMES DAILY PRN
Status: DISCONTINUED | OUTPATIENT
Start: 2024-12-29 | End: 2025-01-06 | Stop reason: HOSPADM

## 2024-12-29 RX ORDER — ACETAMINOPHEN 325 MG/1
650 TABLET ORAL EVERY 8 HOURS PRN
Status: DISCONTINUED | OUTPATIENT
Start: 2024-12-29 | End: 2024-12-30

## 2024-12-29 RX ORDER — PROCHLORPERAZINE EDISYLATE 5 MG/ML
5 INJECTION INTRAMUSCULAR; INTRAVENOUS
Status: COMPLETED | OUTPATIENT
Start: 2024-12-29 | End: 2024-12-29

## 2024-12-29 RX ORDER — VENLAFAXINE HYDROCHLORIDE 75 MG/1
150 CAPSULE, EXTENDED RELEASE ORAL DAILY
Status: DISCONTINUED | OUTPATIENT
Start: 2024-12-29 | End: 2025-01-06 | Stop reason: HOSPADM

## 2024-12-29 RX ORDER — HYDROMORPHONE HYDROCHLORIDE 1 MG/ML
0.5 INJECTION, SOLUTION INTRAMUSCULAR; INTRAVENOUS; SUBCUTANEOUS EVERY 6 HOURS PRN
Status: DISCONTINUED | OUTPATIENT
Start: 2024-12-29 | End: 2024-12-30

## 2024-12-29 RX ORDER — TALC
6 POWDER (GRAM) TOPICAL NIGHTLY PRN
Status: DISCONTINUED | OUTPATIENT
Start: 2024-12-29 | End: 2025-01-06 | Stop reason: HOSPADM

## 2024-12-29 RX ORDER — NALOXONE HCL 0.4 MG/ML
0.02 VIAL (ML) INJECTION
Status: DISCONTINUED | OUTPATIENT
Start: 2024-12-29 | End: 2025-01-06 | Stop reason: HOSPADM

## 2024-12-29 RX ORDER — HYDROMORPHONE HYDROCHLORIDE 2 MG/1
2 TABLET ORAL EVERY 4 HOURS PRN
Status: DISCONTINUED | OUTPATIENT
Start: 2024-12-29 | End: 2024-12-29

## 2024-12-29 RX ORDER — ONDANSETRON HYDROCHLORIDE 2 MG/ML
4 INJECTION, SOLUTION INTRAVENOUS EVERY 6 HOURS PRN
Status: DISCONTINUED | OUTPATIENT
Start: 2024-12-29 | End: 2025-01-06 | Stop reason: HOSPADM

## 2024-12-29 RX ORDER — LACTULOSE 10 G/15ML
10 SOLUTION ORAL 3 TIMES DAILY
Status: DISCONTINUED | OUTPATIENT
Start: 2024-12-29 | End: 2025-01-06 | Stop reason: HOSPADM

## 2024-12-29 RX ORDER — LEVOTHYROXINE SODIUM 75 UG/1
75 TABLET ORAL DAILY
Status: DISCONTINUED | OUTPATIENT
Start: 2024-12-29 | End: 2025-01-06 | Stop reason: HOSPADM

## 2024-12-29 RX ADMIN — LACTULOSE 10 G: 20 SOLUTION ORAL at 09:12

## 2024-12-29 RX ADMIN — PROCHLORPERAZINE EDISYLATE 5 MG: 5 INJECTION INTRAMUSCULAR; INTRAVENOUS at 09:12

## 2024-12-29 RX ADMIN — HEPARIN SODIUM 5000 UNITS: 5000 INJECTION INTRAVENOUS; SUBCUTANEOUS at 05:12

## 2024-12-29 RX ADMIN — HYDROMORPHONE HYDROCHLORIDE 2 MG: 2 TABLET ORAL at 12:12

## 2024-12-29 RX ADMIN — HYDROMORPHONE HYDROCHLORIDE 0.5 MG: 1 INJECTION, SOLUTION INTRAMUSCULAR; INTRAVENOUS; SUBCUTANEOUS at 09:12

## 2024-12-29 RX ADMIN — ONDANSETRON 4 MG: 2 INJECTION INTRAMUSCULAR; INTRAVENOUS at 09:12

## 2024-12-29 RX ADMIN — VENLAFAXINE HYDROCHLORIDE 150 MG: 75 CAPSULE, EXTENDED RELEASE ORAL at 01:12

## 2024-12-29 RX ADMIN — LEVOTHYROXINE SODIUM 75 MCG: 75 TABLET ORAL at 01:12

## 2024-12-29 NOTE — HPI
Ms. 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 She presents to [facility] ED today with her spouse d/o hyponatremia, she gets routine lab, the recent lab shows sodium of 118 and she was told by her provider to come to the ED. Her chronic medications include Lasix, spironolactone, tacrolimus, levothyroxine, venlafaxine, ramipril, and pantoprazole. The patient reported that she has not been taking her antidiuretic and antihypertensive medications recently, except for Lasix, due to concerns about hyponatremia. She appears slightly confused, though this seems consistent with her baseline. She denies chest pain, fever, or chills.    In the ED, Afebrile, SBP slightly elevated (160), otherwise vitals were stable. Labs were remarkable for Na- 121, K-5.1, Tbil- 5.3, Alb- 2.4, ALP- 438, AST- 97, ALT- 49, PT- 14.4. Urine Osm pending.    Admit to hospital medicine for further work-up and management of hyponatremia.

## 2024-12-29 NOTE — ED NOTES
Telemetry Verification   Patient placed on Telemetry Box  Verified with War Room  Box # [id number]   Monitor Tech Ronesha   Rate 70   Rhythm NS

## 2024-12-29 NOTE — PROGRESS NOTES
Patient arrived to the unit from US, no acute distress noted. Team notified. Patient notified of FR. Oriented to room and call bell.

## 2024-12-29 NOTE — ED TRIAGE NOTES
Hx cirrhosis with liver transplant in 2002; recent lab work showed low sodium; currentl c/o nausea, dizziness, and HA; significant ascites and jaundiced

## 2024-12-29 NOTE — H&P
[facility] - Emergency Dept  Hospital Medicine  History & Physical    Patient Name: Elda Hernandez  MRN: [id number]  Patient Class: IP- Inpatient  Admission Date: 12/29/2024  Attending Physician: Jakob Shelton, *   Primary Care Provider: David Lorenzo MD         Patient information was obtained from patient, spouse/SO, past medical records, and ER records.     Subjective:     Principal Problem:Hyponatremia    Chief Complaint:   Chief Complaint   Patient presents with    Abnormal Lab     Recent blood work yesterday, told sodium is 118, Liver Transplant 2002.         HPI: Ms. 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 She presents to [facility] ED today with her spouse d/o hyponatremia, she gets routine lab, the recent lab shows sodium of 118 and she was told by her provider to come to the ED. Her chronic medications include Lasix, spironolactone, tacrolimus, levothyroxine, venlafaxine, ramipril, and pantoprazole. The patient reported that she has not been taking her antidiuretic and antihypertensive medications recently, except for Lasix, due to concerns about hyponatremia. She appears slightly confused, though this seems consistent with her baseline. She denies chest pain, fever, or chills.    In the ED, Afebrile, SBP slightly elevated (160), otherwise vitals were stable. Labs were remarkable for Na- 121, K-5.1, Tbil- 5.3, Alb- 2.4, ALP- 438, AST- 97, ALT- 49, PT- 14.4. Urine Osm pending.    Admit to hospital medicine for further work-up and management of hyponatremia.      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    Arnold-Chiari malformation     Bacteremia 12/22/2023    Depression     Esophageal stricture     Essential tremor     Hypertension     Left bundle branch block     Liver fibrosis, transplanted liver 10/02/2018    Suggested on fibroscan 10/2/18    Migraine without aura     MVP (mitral valve prolapse)     Non-rheumatic mitral regurgitation 10/01/2018    Non-rheumatic tricuspid valve insufficiency 10/01/2018    Osteoporosis     Palliative care encounter 8/19/2024    Perforated abdominal viscus 09/04/2020    Recurrent urinary tract infection     Seizures     Shingles 2007    SIADH (syndrome of inappropriate ADH production)     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    Tricuspid valve prolapse     Urolithiasis     Von Gierke disease     s/p liver transplant       Past Surgical History:   Procedure Laterality Date    APPENDECTOMY  6/22/2007    APPLICATION OF WOUND VACUUM-ASSISTED CLOSURE DEVICE N/A 9/18/2020    Procedure: APPLICATION, WOUND VAC;  Surgeon: Zain Decker MD;  Location: [facility] OR [address] [place] [address];  Service: General;  Laterality: N/A;    COLONOSCOPY  5/13/2008    internal hemorrhoids    CRANIOTOMY      ESOPHAGOGASTRODUODENOSCOPY N/A 9/3/2020    Procedure: EGD (ESOPHAGOGASTRODUODENOSCOPY);  Surgeon: Tyrel Vergara MD;  Location: [facility];  Service: Endoscopy;  Laterality: N/A;    ESOPHAGOGASTRODUODENOSCOPY N/A 12/22/2023    Procedure: EGD (ESOPHAGOGASTRODUODENOSCOPY);  Surgeon: Jhony James MD;  Location: [address]);  Service: Endoscopy;  Laterality: N/A;    EXPLORATORY LAPAROTOMY  2020    due to perforated stomach    LAMINECTOMY  3/2001    LIVER TRANSPLANT  9/23/2002    OSSICULAR RECONSTRUCTION  10/4/1995    RIGHT REPLACEMENT PROSTHESIS for cholesteatoma    THYMECTOMY  5/2/2007    TONSILLECTOMY, ADENOIDECTOMY  1/21/2004    TOTAL ABDOMINAL HYSTERECTOMY  3/31/1994       Review of patient's allergies indicates:   Allergen Reactions     Codeine Itching     Other reaction(s): Itching    Lipitor [atorvastatin] Other (See Comments)     Other reaction(s): Muscle pain  Muscle cranmps    Morphine Itching     Other reaction(s): nausea and vomiting     Zoloft [sertraline] Other (See Comments)     Tremors/muscle spasms       No current facility-administered medications on file prior to encounter.     Current Outpatient Medications on File Prior to Encounter   Medication Sig    diphenhydrAMINE (BENADRYL) 25 mg capsule Take 25 mg by mouth daily as needed for Allergies.    furosemide (LASIX) 20 MG tablet Take 1 tablet (20 mg total) by mouth once daily.    lactulose (CHRONULAC) 10 gram/15 mL solution Take 15 mLs (10 g total) by mouth 3 (three) times daily.    levothyroxine (SYNTHROID) 75 MCG tablet Take 1 tablet (75 mcg total) by mouth once daily.    magnesium oxide (MAG-OX) 400 mg (241.3 mg magnesium) tablet TAKE 1 TABLET(400 MG) BY MOUTH TWICE DAILY    multivitamin (ONE DAILY MULTIVITAMIN) per tablet Take 1 tablet by mouth once daily.    pantoprazole (PROTONIX) 20 MG tablet Take 1 tablet (20 mg total) by mouth once daily.    pravastatin (PRAVACHOL) 20 MG tablet Take 1 tablet (20 mg total) by mouth once daily. Need OFFICE VISIT before next refill, last seen 9/2023. This will be the LAST Rx if visit is not made.    ramipriL (ALTACE) 5 MG capsule TAKE 1 CAPSULE(5 MG) BY MOUTH EVERY DAY    spironolactone (ALDACTONE) 50 MG tablet Take 1 tablet (50 mg total) by mouth once daily.    tacrolimus (PROGRAF) 0.5 MG Cap Take 1 capsule (0.5 mg total) by mouth every 12 (twelve) hours.    venlafaxine (EFFEXOR-XR) 150 MG Cp24 Take 1 capsule (150 mg total) by mouth once daily.     Family History       Problem Relation (Age of Onset)    Heart disease Mother    No Known Problems Father    Stroke Mother          Tobacco Use    Smoking status: Never    Smokeless tobacco: Never   Substance and Sexual Activity    Alcohol use: No    Drug use: No    Sexual activity: Not on file      Review of Systems   Constitutional:  Negative for fever.   HENT:  Negative for congestion.    Respiratory:  Negative for shortness of breath.    Gastrointestinal:  Positive for abdominal distention and abdominal pain. Negative for vomiting.     Objective:     Vital Signs (Most Recent):  Temp: 96.8 °F (36 °C) (12/29/24 0858)  Pulse: 71 (12/29/24 1300)  Resp: 18 (12/29/24 1300)  BP: (!) 172/73 (12/29/24 1300)  SpO2: 100 % (12/29/24 1300) Vital Signs (24h Range):  Temp:  [96.8 °F (36 °C)] 96.8 °F (36 °C)  Pulse:  [68-71] 71  Resp:  [14-20] 18  SpO2:  [99 %-100 %] 100 %  BP: (125-172)/(66-73) 172/73     Weight: 56.7 kg (125 lb)  Body mass index is 25.25 kg/m².     Physical Exam  Vitals and nursing note reviewed.   Constitutional:       General: She is not in acute distress.     Appearance: She is ill-appearing (Chronically ill-looking).   HENT:      Head: Normocephalic and atraumatic.      Mouth/Throat:      Mouth: Mucous membranes are moist.   Eyes:      General: Scleral icterus present.   Cardiovascular:      Rate and Rhythm: Normal rate and regular rhythm.      Pulses: Normal pulses.      Heart sounds: Normal heart sounds.   Pulmonary:      Effort: Pulmonary effort is normal. No respiratory distress.      Breath sounds: Normal breath sounds.   Abdominal:      General: There is distension.      Tenderness: There is abdominal tenderness.   Musculoskeletal:      Right lower leg: No edema.      Left lower leg: No edema.   Neurological:      Mental Status: She is alert.                Significant Labs: All pertinent labs within the past 24 hours have been reviewed.    Significant Imaging: I have reviewed all pertinent imaging results/findings within the past 24 hours.  Assessment/Plan:     * Hyponatremia  She presents to [facility] ED today with her spouse d/o hyponatremia, she gets routine lab, the recent lab shows sodium of 118 and she was told by her provider to come to the ED. Her chronic medications include Lasix,  spironolactone, tacrolimus, levothyroxine, venlafaxine, ramipril, and pantoprazole. The patient reported that she has not been taking her antidiuretic and antihypertensive medications recently, except for Lasix, due to concerns about hyponatremia. She appears slightly confused, though this seems consistent with her baseline. She denies chest pain, fever, or chills.  Hyponatremia is likely due to Dehydration/hypovolemia and Cirrhosis. The patient's most recent sodium results are listed below.  Recent Labs     12/28/24  0823 12/29/24  0947   * 121*     Plan  - Correct the sodium by 4-6mEq in 24 hours.   - Obtain the following studies: Urine sodium, urine osmolality, serum osmolality.  - Will treat the hyponatremia with IV fluids as needed and Fluid restriction of:   800 ml  per day  - Monitor sodium Every 12 hours.         Cirrhosis of liver with ascites  Patient with known Cirrhosis    MELD-Na score calculated; MELD 3.0: 26 at 12/29/2024  9:47 AM  MELD-Na: 26 at 12/29/2024  9:47 AM  Calculated from:  Serum Creatinine: 1.2 mg/dL at 12/29/2024  9:47 AM  Serum Sodium: 121 mmol/L (Using min of 125 mmol/L) at 12/29/2024  9:47 AM  Total Bilirubin: 5.3 mg/dL at 12/29/2024  9:47 AM  Serum Albumin: 2.4 g/dL at 12/29/2024  9:47 AM  INR(ratio): 1.3 at 12/29/2024  9:47 AM  Age at listing (hypothetical): 54 years  Sex: Female at 12/29/2024  9:47 AM      Plan:  -- Continue chronic meds.   -- Avoid any hepatotoxic meds  -- Monitor CBC/CMP/INR for synthetic function.     RUQ abdominal pain  Chronic RUQ pain:    Plan:  -- Pain medication as appropriate    Acquired hypothyroidism  Known hypothyroidism    Plan:  -- Continue home Levothyroxine    S/P liver transplant 9/23/2002 for von Gierke disease  known liver transplant in 9/2002.     Plan:  -- Hepatology consulted to assist in immunosuppressive medicine management and plan to continue home Tacrolimus to treat on discharge.        VTE Risk Mitigation (From admission, onward)            Ordered     heparin (porcine) injection 5,000 Units  Every 8 hours         12/29/24 1331     Place sequential compression device  Until discontinued         12/29/24 1331     IP VTE LOW RISK PATIENT  Once         12/29/24 1331     Place sequential compression device  Until discontinued         12/29/24 1254                                    Jorge Weber MD  Department of Hospital Medicine  [facility] - Emergency Dept

## 2024-12-29 NOTE — SUBJECTIVE & OBJECTIVE
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    Arnold-Chiari malformation     Bacteremia 12/22/2023    Depression     Esophageal stricture     Essential tremor     Hypertension     Left bundle branch block     Liver fibrosis, transplanted liver 10/02/2018    Suggested on fibroscan 10/2/18    Migraine without aura     MVP (mitral valve prolapse)     Non-rheumatic mitral regurgitation 10/01/2018    Non-rheumatic tricuspid valve insufficiency 10/01/2018    Osteoporosis     Palliative care encounter 8/19/2024    Perforated abdominal viscus 09/04/2020    Recurrent urinary tract infection     Seizures     Shingles 2007    SIADH (syndrome of inappropriate ADH production)     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    Tricuspid valve prolapse     Urolithiasis     Von Gierke disease     s/p liver transplant       Past Surgical History:   Procedure Laterality Date    APPENDECTOMY  6/22/2007    APPLICATION OF WOUND VACUUM-ASSISTED CLOSURE DEVICE N/A 9/18/2020    Procedure: APPLICATION, WOUND VAC;  Surgeon: Zain Decker MD;  Location: [facility] OR [address] [place] [address];  Service: General;  Laterality: N/A;    COLONOSCOPY  5/13/2008    internal hemorrhoids    CRANIOTOMY      ESOPHAGOGASTRODUODENOSCOPY N/A 9/3/2020    Procedure: EGD (ESOPHAGOGASTRODUODENOSCOPY);  Surgeon: Tyrel Vergara MD;  Location: [facility];  Service: Endoscopy;  Laterality: N/A;    ESOPHAGOGASTRODUODENOSCOPY N/A 12/22/2023    Procedure: EGD (ESOPHAGOGASTRODUODENOSCOPY);  Surgeon: Jhony James MD;  Location: [address]);  Service: Endoscopy;  Laterality: N/A;    EXPLORATORY LAPAROTOMY  2020    due to perforated stomach    LAMINECTOMY  3/2001    LIVER TRANSPLANT  9/23/2002    OSSICULAR RECONSTRUCTION  10/4/1995    RIGHT REPLACEMENT PROSTHESIS for cholesteatoma    THYMECTOMY  5/2/2007    TONSILLECTOMY, ADENOIDECTOMY  1/21/2004    TOTAL ABDOMINAL HYSTERECTOMY  3/31/1994       Review of patient's allergies indicates:    Allergen Reactions    Codeine Itching     Other reaction(s): Itching    Lipitor [atorvastatin] Other (See Comments)     Other reaction(s): Muscle pain  Muscle cranmps    Morphine Itching     Other reaction(s): nausea and vomiting     Zoloft [sertraline] Other (See Comments)     Tremors/muscle spasms       No current facility-administered medications on file prior to encounter.     Current Outpatient Medications on File Prior to Encounter   Medication Sig    diphenhydrAMINE (BENADRYL) 25 mg capsule Take 25 mg by mouth daily as needed for Allergies.    furosemide (LASIX) 20 MG tablet Take 1 tablet (20 mg total) by mouth once daily.    lactulose (CHRONULAC) 10 gram/15 mL solution Take 15 mLs (10 g total) by mouth 3 (three) times daily.    levothyroxine (SYNTHROID) 75 MCG tablet Take 1 tablet (75 mcg total) by mouth once daily.    magnesium oxide (MAG-OX) 400 mg (241.3 mg magnesium) tablet TAKE 1 TABLET(400 MG) BY MOUTH TWICE DAILY    multivitamin (ONE DAILY MULTIVITAMIN) per tablet Take 1 tablet by mouth once daily.    pantoprazole (PROTONIX) 20 MG tablet Take 1 tablet (20 mg total) by mouth once daily.    pravastatin (PRAVACHOL) 20 MG tablet Take 1 tablet (20 mg total) by mouth once daily. Need OFFICE VISIT before next refill, last seen 9/2023. This will be the LAST Rx if visit is not made.    ramipriL (ALTACE) 5 MG capsule TAKE 1 CAPSULE(5 MG) BY MOUTH EVERY DAY    spironolactone (ALDACTONE) 50 MG tablet Take 1 tablet (50 mg total) by mouth once daily.    tacrolimus (PROGRAF) 0.5 MG Cap Take 1 capsule (0.5 mg total) by mouth every 12 (twelve) hours.    venlafaxine (EFFEXOR-XR) 150 MG Cp24 Take 1 capsule (150 mg total) by mouth once daily.     Family History       Problem Relation (Age of Onset)    Heart disease Mother    No Known Problems Father    Stroke Mother          Tobacco Use    Smoking status: Never    Smokeless tobacco: Never   Substance and Sexual Activity    Alcohol use: No    Drug use: No    Sexual  activity: Not on file     Review of Systems   Constitutional:  Negative for fever.   HENT:  Negative for congestion.    Respiratory:  Negative for shortness of breath.    Gastrointestinal:  Positive for abdominal distention and abdominal pain. Negative for vomiting.     Objective:     Vital Signs (Most Recent):  Temp: 96.8 °F (36 °C) (12/29/24 0858)  Pulse: 71 (12/29/24 1300)  Resp: 18 (12/29/24 1300)  BP: (!) 172/73 (12/29/24 1300)  SpO2: 100 % (12/29/24 1300) Vital Signs (24h Range):  Temp:  [96.8 °F (36 °C)] 96.8 °F (36 °C)  Pulse:  [68-71] 71  Resp:  [14-20] 18  SpO2:  [99 %-100 %] 100 %  BP: (125-172)/(66-73) 172/73     Weight: 56.7 kg (125 lb)  Body mass index is 25.25 kg/m².     Physical Exam  Vitals and nursing note reviewed.   Constitutional:       General: She is not in acute distress.     Appearance: She is ill-appearing (Chronically ill-looking).   HENT:      Head: Normocephalic and atraumatic.      Mouth/Throat:      Mouth: Mucous membranes are moist.   Eyes:      General: Scleral icterus present.   Cardiovascular:      Rate and Rhythm: Normal rate and regular rhythm.      Pulses: Normal pulses.      Heart sounds: Normal heart sounds.   Pulmonary:      Effort: Pulmonary effort is normal. No respiratory distress.      Breath sounds: Normal breath sounds.   Abdominal:      General: There is distension.      Tenderness: There is abdominal tenderness.   Musculoskeletal:      Right lower leg: No edema.      Left lower leg: No edema.   Neurological:      Mental Status: She is alert.                Significant Labs: All pertinent labs within the past 24 hours have been reviewed.    Significant Imaging: I have reviewed all pertinent imaging results/findings within the past 24 hours.

## 2024-12-29 NOTE — ED NOTES
I-STAT Chem-8+ Results:   Value Reference Range   Sodium 122 136-145 mmol/L   Potassium  5.2 3.5-5.1 mmol/L   Chloride 92  mmol/L   Ionized Calcium 1.09 1.06-1.42 mmol/L   CO2 (measured) 22 23-29 mmol/L   Glucose 81  mg/dL   BUN 20 6-30 mg/dL   Creatinine 1.4 0.5-1.4 mg/dL   Hematocrit 30 36-54%

## 2024-12-29 NOTE — ASSESSMENT & PLAN NOTE
She presents to [facility] ED today with her spouse d/o hyponatremia, she gets routine lab, the recent lab shows sodium of 118 and she was told by her provider to come to the ED. Her chronic medications include Lasix, spironolactone, tacrolimus, levothyroxine, venlafaxine, ramipril, and pantoprazole. The patient reported that she has not been taking her antidiuretic and antihypertensive medications recently, except for Lasix, due to concerns about hyponatremia. She appears slightly confused, though this seems consistent with her baseline. She denies chest pain, fever, or chills.  Hyponatremia is likely due to Dehydration/hypovolemia and Cirrhosis. The patient's most recent sodium results are listed below.  Recent Labs     12/28/24  0823 12/29/24  0947   * 121*     Plan  - Correct the sodium by 4-6mEq in 24 hours.   - Obtain the following studies: Urine sodium, urine osmolality, serum osmolality.  - Will treat the hyponatremia with IV fluids as needed and Fluid restriction of:   800 ml  per day  - Monitor sodium Every 12 hours.

## 2024-12-29 NOTE — ASSESSMENT & PLAN NOTE
known liver transplant in 9/2002.     Plan:  -- Hepatology consulted to assist in immunosuppressive medicine management and plan to continue home Tacrolimus to treat on discharge.

## 2024-12-29 NOTE — ED PROVIDER NOTES
Encounter Date: 12/29/2024       History     Chief Complaint   Patient presents with    Abnormal Lab     Recent blood work yesterday, told sodium is 118, Liver Transplant 2002.      HPI  54-year-old female with a past medical history of hypertension, tremor, SIADH, valvular disease, migraines, liver fibrosis with liver transplant in 2002 which has failed and she now has cirrhosis and liver failure who presents with hyponatremia.  She gets routine labs and had them done recently in her sodium was 118.  Her provider called and told her to come to the emergency department.  This has been before, typically related To diuresis. She has had increased abdominal swelling that family reports it is due to ascites.  They state that she typically gets it in her tissues, never really has a pocket to drain with paracentesis.  They started her on Lasix however.  She also reports she has had headache as well as feeling shaky and nauseated.  He states it feels like when her sodium has been low in the past.  Denies chest pain or palpitations.  No fevers or chills.  No leg swelling.  Reports mild confusion.  No focal weakness or numbness.  No falls or injuries.    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    Arnold-Chiari malformation     Bacteremia 12/22/2023    Depression     Esophageal stricture     Essential tremor     Hypertension     Left bundle branch block     Liver fibrosis, transplanted liver 10/02/2018    Suggested on fibroscan 10/2/18    Migraine without aura     MVP (mitral valve prolapse)     Non-rheumatic mitral regurgitation 10/01/2018     Non-rheumatic tricuspid valve insufficiency 10/01/2018    Osteoporosis     Palliative care encounter 8/19/2024    Perforated abdominal viscus 09/04/2020    Recurrent urinary tract infection     Seizures     Shingles 2007    SIADH (syndrome of inappropriate ADH production)     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    Tricuspid valve prolapse     Urolithiasis     Von Gierke disease     s/p liver transplant     Past Surgical History:   Procedure Laterality Date    APPENDECTOMY  6/22/2007    APPLICATION OF WOUND VACUUM-ASSISTED CLOSURE DEVICE N/A 9/18/2020    Procedure: APPLICATION, WOUND VAC;  Surgeon: Zain Decker MD;  [facility];  Service: General;  Laterality: N/A;    COLONOSCOPY  5/13/2008    internal hemorrhoids    CRANIOTOMY      ESOPHAGOGASTRODUODENOSCOPY N/A 9/3/2020    Procedure: EGD (ESOPHAGOGASTRODUODENOSCOPY);  Surgeon: Tyrel Vergara MD;  Location: [facility];  Service: Endoscopy;  Laterality: N/A;    ESOPHAGOGASTRODUODENOSCOPY N/A 12/22/2023    Procedure: EGD (ESOPHAGOGASTRODUODENOSCOPY);  Surgeon: Jhony James MD;  [facility]  Service: Endoscopy;  Laterality: N/A;    EXPLORATORY LAPAROTOMY  2020    due to perforated stomach    LAMINECTOMY  3/2001    LIVER TRANSPLANT  9/23/2002    OSSICULAR RECONSTRUCTION  10/4/1995    RIGHT REPLACEMENT PROSTHESIS for cholesteatoma    THYMECTOMY  5/2/2007    TONSILLECTOMY, ADENOIDECTOMY  1/21/2004    TOTAL ABDOMINAL HYSTERECTOMY  3/31/1994     Family History   Problem Relation Name Age of Onset    Stroke Mother      Heart disease Mother      No Known Problems Father       Social History     Tobacco Use    Smoking status: Never    Smokeless tobacco: Never   Substance Use Topics    Alcohol use: No    Drug use: No     Review of Systems    Physical Exam     Initial Vitals [12/29/24 0858]   BP Pulse Resp Temp SpO2   (!) 152/68 69 16 96.8 °F (36 °C) 100 %      MAP       --         Physical Exam    Nursing note and vitals  reviewed.  Constitutional:   Chronically ill-appearing, jaundiced, not toxic appearing or in distress   HENT:   Head: Normocephalic and atraumatic.   Nose: Nose normal.   Eyes: EOM are normal. Pupils are equal, round, and reactive to light.   Scleral icterus   Neck:   Normal range of motion.  Cardiovascular:  Normal rate, regular rhythm and normal heart sounds.     Exam reveals no gallop and no friction rub.       No murmur heard.  Pulmonary/Chest: Breath sounds normal. No respiratory distress. She has no wheezes. She has no rhonchi. She has no rales.   Abdominal: Abdomen is soft.   Abdominal swelling but soft and not tender or firm   Musculoskeletal:         General: No tenderness or edema. Normal range of motion.      Cervical back: Normal range of motion.     Neurological: She is alert and oriented to person, place, and time. She has normal strength. No sensory deficit.   Skin: Skin is warm and dry.   Facial jaundice   Psychiatric: She has a normal mood and affect.         ED Course   Procedures  Labs Reviewed   CBC W/ AUTO DIFFERENTIAL - Abnormal       Result Value    WBC 3.49 (*)     RBC 3.28 (*)     Hemoglobin 9.8 (*)     Hematocrit 28.6 (*)     MCV 87      MCH 29.9      MCHC 34.3      RDW 14.6 (*)     Platelets 93 (*)     MPV 10.7      Immature Granulocytes 0.3      Gran # (ANC) 2.0      Immature Grans (Abs) 0.01      Lymph # 0.7 (*)     Mono # 0.6      Eos # 0.2      Baso # 0.03      nRBC 0      Gran % 55.8      Lymph % 21.2      Mono % 16.9 (*)     Eosinophil % 4.9      Basophil % 0.9      Differential Method Automated     COMPREHENSIVE METABOLIC PANEL - Abnormal    Sodium 121 (*)     Potassium 5.1      Chloride 93 (*)     CO2 18 (*)     Glucose 81      BUN 16      Creatinine 1.2      Calcium 8.7      Total Protein 7.0      Albumin 2.4 (*)     Total Bilirubin 5.3 (*)     Alkaline Phosphatase 438 (*)     AST 97 (*)     ALT 49 (*)     eGFR 53.1 (*)     Anion Gap 10     OSMOLALITY, SERUM - Abnormal     Osmolality 262 (*)    PROTIME-INR - Abnormal    Prothrombin Time 14.4 (*)     INR 1.3 (*)    ISTAT PROCEDURE - Abnormal    POC Glucose 81      POC BUN 20      POC Creatinine 1.4      POC Sodium 122 (*)     POC Potassium 5.2 (*)     POC Chloride 92 (*)     POC TCO2 (MEASURED) 22 (*)     POC Ionized Calcium 1.09      POC Hematocrit 30 (*)     Sample TARIK     MAGNESIUM    Magnesium 1.8     TACROLIMUS LEVEL    Tacrolimus Lvl 8.2     LIPASE    Lipase 23     URINALYSIS, REFLEX TO URINE CULTURE   SODIUM, URINE, RANDOM   OSMOLALITY, URINE RANDOM   ISTAT CHEM8        ECG Results              EKG 12-lead (Final result)        Collection Time Result Time QRS Duration OHS QTC Calculation    12/29/24 09:11:35 12/29/24 10:35:30 148 475                     Final result by Interface, Lab In [facility] (12/29/24 10:35:32)                   Narrative:    Test Reason : E87.1,    Vent. Rate :  69 BPM     Atrial Rate :  69 BPM     P-R Int : 126 ms          QRS Dur : 148 ms      QT Int : 444 ms       P-R-T Axes :  56  18  90 degrees    QTcB Int : 475 ms    Normal sinus rhythm  Left bundle branch block  Abnormal ECG  When compared with ECG of 15-Aug-2024 19:41,  No significant change was found  Confirmed by Aly Duffy ([id number]) on 12/29/2024 10:35:28 AM    Referred By:            Confirmed By: Aly Duffy                                  Imaging Results    None          Medications   prochlorperazine injection Soln 5 mg (5 mg Intravenous Given 12/29/24 0955)     Medical Decision Making  54-year-old female with a history of liver fibrosis and a transplant which unfortunately has failed and she now has cirrhosis and liver failure.  Presents with hyponatremia on outpatient labs.  She is symptomatic with fatigue, intermittent confusion, headache, nausea.  She also reports abdominal swelling.  No fevers or abdominal tenderness to suggest SBP or other source of infection.  I think her sodium is likely low secondary to diuresis/medications.  She  reports a headache but, again, no fevers suggestive of meningitis.  No trauma to suggest she needs a head CT to rule out bleed.  She has a grossly intact neuro exam.  Again, at this time I do not think she has a head CT.  We will repeat labs here.  EKG is at baseline.  Highly likely she will need admission for correction of sodium but pending confirmation.    Na 121. Discussed admission with hospital medicine    Amount and/or Complexity of Data Reviewed  Labs: ordered.    Risk  Prescription drug management.                           EKG independent interpretation - sinus, regular, rate 70, left bundle-branch block, does not meet criteria for Sgarbossa/STEMI, similar to old           Clinical Impression:  Final diagnoses:  [E87.1] Hyponatremia (Primary)                 Jeanine Gipson MD  12/29/24 5140

## 2024-12-29 NOTE — ASSESSMENT & PLAN NOTE
Patient with known Cirrhosis    MELD-Na score calculated; MELD 3.0: 26 at 12/29/2024  9:47 AM  MELD-Na: 26 at 12/29/2024  9:47 AM  Calculated from:  Serum Creatinine: 1.2 mg/dL at 12/29/2024  9:47 AM  Serum Sodium: 121 mmol/L (Using min of 125 mmol/L) at 12/29/2024  9:47 AM  Total Bilirubin: 5.3 mg/dL at 12/29/2024  9:47 AM  Serum Albumin: 2.4 g/dL at 12/29/2024  9:47 AM  INR(ratio): 1.3 at 12/29/2024  9:47 AM  Age at listing (hypothetical): 54 years  Sex: Female at 12/29/2024  9:47 AM      Plan:  -- Continue chronic meds.   -- Avoid any hepatotoxic meds  -- Monitor CBC/CMP/INR for synthetic function.

## 2024-12-30 ENCOUNTER — TELEPHONE (OUTPATIENT)
Dept: TRANSPLANT | Facility: CLINIC | Age: 54
End: 2024-12-30
Payer: MEDICARE

## 2024-12-30 LAB
ALBUMIN SERPL BCP-MCNC: 2.1 G/DL (ref 3.5–5.2)
ALP SERPL-CCNC: 372 U/L (ref 40–150)
ALT SERPL W/O P-5'-P-CCNC: 40 U/L (ref 10–44)
AMMONIA PLAS-SCNC: 105 UMOL/L (ref 10–50)
ANION GAP SERPL CALC-SCNC: 5 MMOL/L (ref 8–16)
ANION GAP SERPL CALC-SCNC: 6 MMOL/L (ref 8–16)
ANION GAP SERPL CALC-SCNC: 6 MMOL/L (ref 8–16)
AST SERPL-CCNC: 65 U/L (ref 10–40)
BASOPHILS # BLD AUTO: 0.04 K/UL (ref 0–0.2)
BASOPHILS NFR BLD: 1.1 % (ref 0–1.9)
BILIRUB SERPL-MCNC: 4.6 MG/DL (ref 0.1–1)
BILIRUB UR QL STRIP: NEGATIVE
BUN SERPL-MCNC: 20 MG/DL (ref 6–20)
BUN SERPL-MCNC: 20 MG/DL (ref 6–20)
BUN SERPL-MCNC: 24 MG/DL (ref 6–20)
CALCIUM SERPL-MCNC: 8.6 MG/DL (ref 8.7–10.5)
CALCIUM SERPL-MCNC: 8.9 MG/DL (ref 8.7–10.5)
CALCIUM SERPL-MCNC: 8.9 MG/DL (ref 8.7–10.5)
CHLORIDE SERPL-SCNC: 94 MMOL/L (ref 95–110)
CHLORIDE SERPL-SCNC: 95 MMOL/L (ref 95–110)
CHLORIDE SERPL-SCNC: 95 MMOL/L (ref 95–110)
CLARITY UR REFRACT.AUTO: CLEAR
CO2 SERPL-SCNC: 21 MMOL/L (ref 23–29)
CO2 SERPL-SCNC: 23 MMOL/L (ref 23–29)
CO2 SERPL-SCNC: 24 MMOL/L (ref 23–29)
COLOR UR AUTO: YELLOW
CREAT SERPL-MCNC: 1.4 MG/DL (ref 0.5–1.4)
CREAT SERPL-MCNC: 1.6 MG/DL (ref 0.5–1.4)
CREAT SERPL-MCNC: 1.7 MG/DL (ref 0.5–1.4)
DIFFERENTIAL METHOD BLD: ABNORMAL
EOSINOPHIL # BLD AUTO: 0.2 K/UL (ref 0–0.5)
EOSINOPHIL NFR BLD: 5.1 % (ref 0–8)
ERYTHROCYTE [DISTWIDTH] IN BLOOD BY AUTOMATED COUNT: 15.1 % (ref 11.5–14.5)
EST. GFR  (NO RACE VARIABLE): 35 ML/MIN/1.73 M^2
EST. GFR  (NO RACE VARIABLE): 37.6 ML/MIN/1.73 M^2
EST. GFR  (NO RACE VARIABLE): 44.2 ML/MIN/1.73 M^2
GLUCOSE SERPL-MCNC: 104 MG/DL (ref 70–110)
GLUCOSE SERPL-MCNC: 84 MG/DL (ref 70–110)
GLUCOSE SERPL-MCNC: 90 MG/DL (ref 70–110)
GLUCOSE UR QL STRIP: NEGATIVE
HCT VFR BLD AUTO: 27 % (ref 37–48.5)
HGB BLD-MCNC: 9.1 G/DL (ref 12–16)
HGB UR QL STRIP: NEGATIVE
IMM GRANULOCYTES # BLD AUTO: 0.01 K/UL (ref 0–0.04)
IMM GRANULOCYTES NFR BLD AUTO: 0.3 % (ref 0–0.5)
KETONES UR QL STRIP: NEGATIVE
LEUKOCYTE ESTERASE UR QL STRIP: NEGATIVE
LYMPHOCYTES # BLD AUTO: 0.7 K/UL (ref 1–4.8)
LYMPHOCYTES NFR BLD: 20 % (ref 18–48)
MAGNESIUM SERPL-MCNC: 1.8 MG/DL (ref 1.6–2.6)
MCH RBC QN AUTO: 29.8 PG (ref 27–31)
MCHC RBC AUTO-ENTMCNC: 33.7 G/DL (ref 32–36)
MCV RBC AUTO: 89 FL (ref 82–98)
MONOCYTES # BLD AUTO: 0.8 K/UL (ref 0.3–1)
MONOCYTES NFR BLD: 22.6 % (ref 4–15)
NEUTROPHILS # BLD AUTO: 1.8 K/UL (ref 1.8–7.7)
NEUTROPHILS NFR BLD: 50.9 % (ref 38–73)
NITRITE UR QL STRIP: NEGATIVE
NRBC BLD-RTO: 0 /100 WBC
OSMOLALITY UR: 208 MOSM/KG (ref 50–1200)
PH UR STRIP: 7 [PH] (ref 5–8)
PHOSPHATE SERPL-MCNC: 3.6 MG/DL (ref 2.7–4.5)
PLATELET # BLD AUTO: 84 K/UL (ref 150–450)
PMV BLD AUTO: 9.6 FL (ref 9.2–12.9)
POTASSIUM SERPL-SCNC: 4.9 MMOL/L (ref 3.5–5.1)
POTASSIUM SERPL-SCNC: 5.1 MMOL/L (ref 3.5–5.1)
POTASSIUM SERPL-SCNC: 5.1 MMOL/L (ref 3.5–5.1)
PROT SERPL-MCNC: 6.1 G/DL (ref 6–8.4)
PROT UR QL STRIP: NEGATIVE
RBC # BLD AUTO: 3.05 M/UL (ref 4–5.4)
SODIUM SERPL-SCNC: 122 MMOL/L (ref 136–145)
SODIUM SERPL-SCNC: 123 MMOL/L (ref 136–145)
SODIUM SERPL-SCNC: 124 MMOL/L (ref 136–145)
SODIUM UR-SCNC: 13 MMOL/L (ref 20–250)
SP GR UR STRIP: 1.01 (ref 1–1.03)
T4 FREE SERPL-MCNC: 1.48 NG/DL (ref 0.71–1.51)
TACROLIMUS BLD-MCNC: 5.8 NG/ML (ref 5–15)
TSH SERPL DL<=0.005 MIU/L-ACNC: 7.25 UIU/ML (ref 0.4–4)
URN SPEC COLLECT METH UR: NORMAL
WBC # BLD AUTO: 3.5 K/UL (ref 3.9–12.7)

## 2024-12-30 PROCEDURE — 82140 ASSAY OF AMMONIA: CPT

## 2024-12-30 PROCEDURE — 20600001 HC STEP DOWN PRIVATE ROOM

## 2024-12-30 PROCEDURE — 36415 COLL VENOUS BLD VENIPUNCTURE: CPT

## 2024-12-30 PROCEDURE — 25000003 PHARM REV CODE 250

## 2024-12-30 PROCEDURE — 80048 BASIC METABOLIC PNL TOTAL CA: CPT

## 2024-12-30 PROCEDURE — 84100 ASSAY OF PHOSPHORUS: CPT

## 2024-12-30 PROCEDURE — 83935 ASSAY OF URINE OSMOLALITY: CPT | Performed by: EMERGENCY MEDICINE

## 2024-12-30 PROCEDURE — 80053 COMPREHEN METABOLIC PANEL: CPT

## 2024-12-30 PROCEDURE — 63600175 PHARM REV CODE 636 W HCPCS

## 2024-12-30 PROCEDURE — 84439 ASSAY OF FREE THYROXINE: CPT

## 2024-12-30 PROCEDURE — 80197 ASSAY OF TACROLIMUS: CPT

## 2024-12-30 PROCEDURE — 85025 COMPLETE CBC W/AUTO DIFF WBC: CPT

## 2024-12-30 PROCEDURE — 36415 COLL VENOUS BLD VENIPUNCTURE: CPT | Mod: XB

## 2024-12-30 PROCEDURE — 51701 INSERT BLADDER CATHETER: CPT

## 2024-12-30 PROCEDURE — 99232 SBSQ HOSP IP/OBS MODERATE 35: CPT | Mod: GC,,, | Performed by: INTERNAL MEDICINE

## 2024-12-30 PROCEDURE — 51798 US URINE CAPACITY MEASURE: CPT

## 2024-12-30 PROCEDURE — 63600175 PHARM REV CODE 636 W HCPCS: Mod: JZ,JG

## 2024-12-30 PROCEDURE — 83735 ASSAY OF MAGNESIUM: CPT

## 2024-12-30 PROCEDURE — P9047 ALBUMIN (HUMAN), 25%, 50ML: HCPCS | Mod: JZ,JG

## 2024-12-30 PROCEDURE — 81003 URINALYSIS AUTO W/O SCOPE: CPT

## 2024-12-30 PROCEDURE — 84443 ASSAY THYROID STIM HORMONE: CPT

## 2024-12-30 PROCEDURE — 80048 BASIC METABOLIC PNL TOTAL CA: CPT | Mod: 91,XB

## 2024-12-30 PROCEDURE — 84300 ASSAY OF URINE SODIUM: CPT | Performed by: EMERGENCY MEDICINE

## 2024-12-30 RX ORDER — PROMETHAZINE HYDROCHLORIDE 25 MG/1
25 TABLET ORAL EVERY 6 HOURS PRN
Qty: 30 TABLET | Refills: 0 | Status: SHIPPED | OUTPATIENT
Start: 2024-12-30

## 2024-12-30 RX ORDER — ALBUMIN HUMAN 250 G/1000ML
50 SOLUTION INTRAVENOUS 2 TIMES DAILY
Status: DISCONTINUED | OUTPATIENT
Start: 2024-12-30 | End: 2024-12-31

## 2024-12-30 RX ORDER — ALBUMIN HUMAN 250 G/1000ML
56 SOLUTION INTRAVENOUS 2 TIMES DAILY
Status: DISCONTINUED | OUTPATIENT
Start: 2024-12-30 | End: 2024-12-30

## 2024-12-30 RX ORDER — OXYCODONE HYDROCHLORIDE 5 MG/1
5 TABLET ORAL EVERY 6 HOURS PRN
Status: COMPLETED | OUTPATIENT
Start: 2024-12-30 | End: 2024-12-30

## 2024-12-30 RX ORDER — HYDROMORPHONE HYDROCHLORIDE 1 MG/ML
0.5 INJECTION, SOLUTION INTRAMUSCULAR; INTRAVENOUS; SUBCUTANEOUS ONCE
Status: COMPLETED | OUTPATIENT
Start: 2024-12-30 | End: 2024-12-30

## 2024-12-30 RX ORDER — ACETAMINOPHEN 325 MG/1
650 TABLET ORAL EVERY 8 HOURS PRN
Status: DISCONTINUED | OUTPATIENT
Start: 2024-12-30 | End: 2025-01-06 | Stop reason: HOSPADM

## 2024-12-30 RX ORDER — TACROLIMUS 0.5 MG/1
0.5 CAPSULE ORAL 2 TIMES DAILY
Status: DISCONTINUED | OUTPATIENT
Start: 2024-12-30 | End: 2025-01-06 | Stop reason: HOSPADM

## 2024-12-30 RX ADMIN — VENLAFAXINE HYDROCHLORIDE 150 MG: 75 CAPSULE, EXTENDED RELEASE ORAL at 09:12

## 2024-12-30 RX ADMIN — ONDANSETRON 4 MG: 2 INJECTION INTRAMUSCULAR; INTRAVENOUS at 12:12

## 2024-12-30 RX ADMIN — HEPARIN SODIUM 5000 UNITS: 5000 INJECTION INTRAVENOUS; SUBCUTANEOUS at 05:12

## 2024-12-30 RX ADMIN — HYDROMORPHONE HYDROCHLORIDE 0.5 MG: 1 INJECTION, SOLUTION INTRAMUSCULAR; INTRAVENOUS; SUBCUTANEOUS at 03:12

## 2024-12-30 RX ADMIN — HEPARIN SODIUM 5000 UNITS: 5000 INJECTION INTRAVENOUS; SUBCUTANEOUS at 08:12

## 2024-12-30 RX ADMIN — OXYCODONE 5 MG: 5 TABLET ORAL at 11:12

## 2024-12-30 RX ADMIN — TACROLIMUS 0.5 MG: 0.5 CAPSULE ORAL at 06:12

## 2024-12-30 RX ADMIN — ONDANSETRON 4 MG: 2 INJECTION INTRAMUSCULAR; INTRAVENOUS at 05:12

## 2024-12-30 RX ADMIN — ACETAMINOPHEN 650 MG: 325 TABLET ORAL at 01:12

## 2024-12-30 RX ADMIN — LACTULOSE 10 G: 20 SOLUTION ORAL at 01:12

## 2024-12-30 RX ADMIN — ALBUMIN (HUMAN) 50 G: 12.5 SOLUTION INTRAVENOUS at 03:12

## 2024-12-30 RX ADMIN — Medication 6 MG: at 12:12

## 2024-12-30 RX ADMIN — ONDANSETRON 4 MG: 2 INJECTION INTRAMUSCULAR; INTRAVENOUS at 11:12

## 2024-12-30 RX ADMIN — HEPARIN SODIUM 5000 UNITS: 5000 INJECTION INTRAVENOUS; SUBCUTANEOUS at 01:12

## 2024-12-30 RX ADMIN — HYDROMORPHONE HYDROCHLORIDE 0.5 MG: 1 INJECTION, SOLUTION INTRAMUSCULAR; INTRAVENOUS; SUBCUTANEOUS at 05:12

## 2024-12-30 RX ADMIN — SODIUM CHLORIDE 500 ML: 9 INJECTION, SOLUTION INTRAVENOUS at 11:12

## 2024-12-30 RX ADMIN — LACTULOSE 10 G: 20 SOLUTION ORAL at 08:12

## 2024-12-30 RX ADMIN — LACTULOSE 20 G: 20 SOLUTION ORAL at 07:12

## 2024-12-30 RX ADMIN — TACROLIMUS 0.5 MG: 0.5 CAPSULE ORAL at 09:12

## 2024-12-30 RX ADMIN — LEVOTHYROXINE SODIUM 75 MCG: 75 TABLET ORAL at 07:12

## 2024-12-30 NOTE — PLAN OF CARE
AAOx4. VSS. Up to bathroom independently/standby assist. NSR on tele. Melatonin given, as well as dilaudid IV 1x for HA and zofran IV for nausea. No urine produced during shift. Pt aware of 800cc FR. CMP q12 hours. Bed in low position. Side rails raisedx2, wheels locked. Nonskid shoes when OOB. Call bell and personal items within reach.

## 2024-12-30 NOTE — PLAN OF CARE
Pt is AAOx4 although she sometimes appears anxious and forgetful; afebrile; vital signs stable. She was bladder scanned and in and out cathed this morning. Bladder scan showed 500mL, but inaccurate d/t ascites. 350 with catheter drained, sample sent. Team at bedside with ultrasound to search for pocket of fluid to do para, but none found. Lactulose continued. Albumin ordered and given. 500ml saline bolus also given. She is on a soft diet, but appetite is poor;  brought her some Boosts. Fluid restriction increased to 1500ml. She had an unmeasured urine and a BM this evening. She is up independently.

## 2024-12-30 NOTE — CONSULTS
[facility] - Transplant Stepdown  Hepatology  Consult Note    Patient Name: Elda Hernandez  MRN: [id number]  Admission Date: 12/29/2024  Hospital Length of Stay: 1 days  Attending Provider: Jakob Shelton, *   Primary Care Physician: David Lorenzo MD  Principal Problem:Hyponatremia    Inpatient consult to Hepatology  Consult performed by: Dave Layton DO  Consult ordered by: Maurizio Mccormick MD  Reason for consult: Tacrolimus immunosuppression management        Subjective:     Transplant status:  Not transplant candidate here    HPI:  54 yof pmh liver transplant in 9/2002 due to Von Gierke disease on tacrolimus 0.5 BID, 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 Nielsen, gastric perforation 9/2020 with prolonged hospital stay, SIADH, hyponatremia with multiple hospital stays presenting with hyponatremia at 118 and her ammonia was also elevated at 100. Pt underwent abdominal Us demonstrating new occlusive thrombus in main portal vein with collaterals, not seen in September 2024. Pt was determined to not be a liver re-transplant in 6/2024 due to complicated anatomy. Pt endorses headache, some mild memory issues, chronic belly pain, but no worsening, trouble with urination. Pt also with worsening kidney function noted on most recent labs. Pt started on lactulose, continued tacro level. Hepatology consulted for immunosuppresion    Review of Systems   Constitutional:  Negative for chills and fever.   Respiratory:  Negative for cough and shortness of breath.    Cardiovascular:  Positive for leg swelling. Negative for chest pain.   Gastrointestinal:  Positive for abdominal distention and abdominal pain (chronic).   Genitourinary:  Negative for dysuria.        +retention   Skin:  Negative for rash and wound.   Allergic/Immunologic: Positive for immunocompromised state.   Neurological:  Positive for headaches. Negative for numbness.   Psychiatric/Behavioral:  Positive for  confusion and decreased concentration.        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    Arnold-Chiari malformation     Bacteremia 12/22/2023    Depression     Esophageal stricture     Essential tremor     Hypertension     Left bundle branch block     Liver fibrosis, transplanted liver 10/02/2018    Suggested on fibroscan 10/2/18    Migraine without aura     MVP (mitral valve prolapse)     Non-rheumatic mitral regurgitation 10/01/2018    Non-rheumatic tricuspid valve insufficiency 10/01/2018    Osteoporosis     Palliative care encounter 8/19/2024    Perforated abdominal viscus 09/04/2020    Recurrent urinary tract infection     Seizures     Shingles 2007    SIADH (syndrome of inappropriate ADH production)     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    Tricuspid valve prolapse     Urolithiasis     Von Gierke disease     s/p liver transplant       Past Surgical History:   Procedure Laterality Date    APPENDECTOMY  6/22/2007    APPLICATION OF WOUND VACUUM-ASSISTED CLOSURE DEVICE N/A 9/18/2020    Procedure: APPLICATION, WOUND VAC;  Surgeon: Zain Decker MD;  Location: [facility] OR [address] [place] [address];  Service: General;  Laterality: N/A;    COLONOSCOPY  5/13/2008    internal hemorrhoids    CRANIOTOMY      ESOPHAGOGASTRODUODENOSCOPY N/A 9/3/2020    Procedure: EGD (ESOPHAGOGASTRODUODENOSCOPY);  Surgeon: Tyrel Vergara MD;  Location: [facility];  Service: Endoscopy;  Laterality: N/A;    ESOPHAGOGASTRODUODENOSCOPY N/A 12/22/2023    Procedure: EGD (ESOPHAGOGASTRODUODENOSCOPY);  Surgeon: Jhony James MD;  Location: [address]);  Service: Endoscopy;  Laterality: N/A;    EXPLORATORY LAPAROTOMY  2020    due to perforated stomach    LAMINECTOMY  3/2001    LIVER TRANSPLANT  9/23/2002    OSSICULAR RECONSTRUCTION  10/4/1995    RIGHT REPLACEMENT PROSTHESIS for cholesteatoma    THYMECTOMY  5/2/2007    TONSILLECTOMY, ADENOIDECTOMY  1/21/2004    TOTAL ABDOMINAL HYSTERECTOMY  3/31/1994        Review of patient's allergies indicates:   Allergen Reactions    Codeine Itching     Other reaction(s): Itching    Lipitor [atorvastatin] Other (See Comments)     Other reaction(s): Muscle pain  Muscle cranmps    Morphine Itching     Other reaction(s): nausea and vomiting     Zoloft [sertraline] Other (See Comments)     Tremors/muscle spasms         Tobacco Use    Smoking status: Never    Smokeless tobacco: Never   Substance and Sexual Activity    Alcohol use: No    Drug use: No    Sexual activity: Not on file       Medications Prior to Admission   Medication Sig Dispense Refill Last Dose/Taking    diphenhydrAMINE (BENADRYL) 25 mg capsule Take 25 mg by mouth daily as needed for Allergies.       furosemide (LASIX) 20 MG tablet Take 1 tablet (20 mg total) by mouth once daily. 30 tablet 11     levothyroxine (SYNTHROID) 75 MCG tablet Take 1 tablet (75 mcg total) by mouth once daily. 90 tablet 3     magnesium oxide (MAG-OX) 400 mg (241.3 mg magnesium) tablet TAKE 1 TABLET(400 MG) BY MOUTH TWICE DAILY 180 tablet 3     multivitamin (ONE DAILY MULTIVITAMIN) per tablet Take 1 tablet by mouth once daily.       pantoprazole (PROTONIX) 20 MG tablet Take 1 tablet (20 mg total) by mouth once daily. 90 tablet 3     pravastatin (PRAVACHOL) 20 MG tablet Take 1 tablet (20 mg total) by mouth once daily. Need OFFICE VISIT before next refill, last seen 9/2023. This will be the LAST Rx if visit is not made. 90 tablet 1     ramipriL (ALTACE) 5 MG capsule TAKE 1 CAPSULE(5 MG) BY MOUTH EVERY DAY 90 capsule 3     spironolactone (ALDACTONE) 50 MG tablet Take 1 tablet (50 mg total) by mouth once daily. 30 tablet 11     tacrolimus (PROGRAF) 0.5 MG Cap Take 1 capsule (0.5 mg total) by mouth every 12 (twelve) hours. 180 capsule 3     venlafaxine (EFFEXOR-XR) 150 MG Cp24 Take 1 capsule (150 mg total) by mouth once daily. 90 capsule 3        Objective:     Vital Signs (Most Recent):  Temp: 98.5 °F (36.9 °C) (12/30/24 1139)  Pulse: 73  (12/30/24 1139)  Resp: 16 (12/30/24 0709)  BP: (!) 131/58 (12/30/24 1139)  SpO2: 96 % (12/30/24 1139) Vital Signs (24h Range):  Temp:  [98 °F (36.7 °C)-98.5 °F (36.9 °C)] 98.5 °F (36.9 °C)  Pulse:  [65-75] 73  Resp:  [15-20] 16  SpO2:  [96 %-100 %] 96 %  BP: (116-172)/(57-73) 131/58     Weight: 56.8 kg (125 lb 1.8 oz) (12/30/24 0528)  Body mass index is 25.27 kg/m².       Physical Exam  Constitutional:       General: She is not in acute distress.     Appearance: She is not toxic-appearing.   HENT:      Head: Normocephalic and atraumatic.      Mouth/Throat:      Mouth: Mucous membranes are moist.      Pharynx: Oropharynx is clear.   Eyes:      General: No scleral icterus.     Extraocular Movements: Extraocular movements intact.   Cardiovascular:      Rate and Rhythm: Normal rate and regular rhythm.   Pulmonary:      Effort: Pulmonary effort is normal. No respiratory distress.      Breath sounds: No wheezing.   Abdominal:      General: There is distension.      Tenderness: There is abdominal tenderness in the right upper quadrant. There is no guarding or rebound.      Comments: Pt states chronic and no worsening of pain   Musculoskeletal:      Right lower leg: Edema present.      Left lower leg: Edema present.   Skin:     General: Skin is warm and dry.   Neurological:      General: No focal deficit present.      Mental Status: She is alert.   Psychiatric:         Mood and Affect: Mood normal.         Behavior: Behavior normal.      Comments: Answers questions appropriately            MELD 3.0: 29 at 12/30/2024  6:00 AM  MELD-Na: 28 at 12/30/2024  6:00 AM  Calculated from:  Serum Creatinine: 1.6 mg/dL at 12/30/2024  6:00 AM  Serum Sodium: 122 mmol/L (Using min of 125 mmol/L) at 12/30/2024  6:00 AM  Total Bilirubin: 4.6 mg/dL at 12/30/2024  6:00 AM  Serum Albumin: 2.1 g/dL at 12/30/2024  6:00 AM  INR(ratio): 1.3 at 12/29/2024  9:47 AM  Age at listing (hypothetical): 54 years  Sex: Female at 12/30/2024  6:00  AM      Significant Labs:  Labs within the past month have been reviewed.    Significant Imaging:  Reviewed  Assessment/Plan:     GI  Cirrhosis of liver with ascites  54 yof with pmh liver transplant 2002 from von gierke's disease now with cirrhosis of transplanted liver from ductopenic rejection on tacrolimus 0.5 BID, gastric perforation with difficult abdominal anatomy in 2020, SIADH, hypothyroidism presenting with hyponatremia found on lab work and told to come to the hospital. Pt sodium was 118 on arrival, pt has been holding her aldactone with continued issues of hyponatremia but has continued her lasix. 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 Pt Cr has continued to trend up from 1.2 on arrival to 1.6 and ammonia was elevated at 100. Pt urine was bland, Urine Na low, Urine osm 200, serum osm low. Pt was told in June of 2024 she was not a candidate for re-transplantation due to her complex anatomy. Pt follows with Dr. Nielsen. Pt endorses that she is willing to have a second opinion for her transplantation status at a different center. Pt also voluntarily brought up if there were no options she would be interested in talking with palliative care, unsure if  is amendable. He had prior concerns when mentioned in earlier appointments.    HypoNa differential: SIADH vs worsening cirrhosis  JACQUE: concern for HRS     Recommend  Albumin challenge with HRS concern (25g albumin BID)  Discussed with patient to call her insurance and see where else she can undergo transplant evaluation   Discussed patient at transplant conference with noted possible of [employer] in [place], [place] or [place]  Recommend tapering venlafaxine with history of SIADH  Cut out free water  Boost and Pedialyte supplementation  Continue holding lasix/aldactone  Continue lactulose  Continue tacro 0.5mg BID  Obtain paracentesis if sufficient pocket present  Daily cmp,  tacro levels        Thank you for your consult. I will follow-up with patient. Please contact us if you have any additional questions.    Dave Layton, DO  Hepatology  [facility] - Transplant Stepdown

## 2024-12-30 NOTE — ASSESSMENT & PLAN NOTE
54 yof with pmh liver transplant 2002 from von gierke's disease now with cirrhosis of transplanted liver from ductopenic rejection on tacrolimus 0.5 BID, gastric perforation with difficult abdominal anatomy in 2020, SIADH, hypothyroidism presenting with hyponatremia found on lab work and told to come to the hospital. Pt sodium was 118 on arrival, pt has been holding her aldactone with continued issues of hyponatremia but has continued her lasix. 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 Pt Cr has continued to trend up from 1.2 on arrival to 1.6 and ammonia was elevated at 100. Pt urine was bland, Urine Na low, Urine osm 200, serum osm low. Pt was told in June of 2024 she was not a candidate for re-transplantation due to her complex anatomy. Pt follows with Dr. Nielsen. Pt endorses that she is willing to have a second opinion for her transplantation status at a different center. Pt also voluntarily brought up if there were no options she would be interested in talking with palliative care, unsure if  is amendable. He had prior concerns when mentioned in earlier appointments.    HypoNa differential: SIADH vs worsening cirrhosis  JACQUE: concern for HRS     Recommend  Albumin challenge with HRS concern (25g albumin BID)  Discussed with patient to call her insurance and see where else she can undergo transplant evaluation   Discussed patient at transplant conference with noted possible of [employer] in [place], [place] or [place]  Recommend tapering venlafaxine with history of SIADH  Cut out free water  Boost and Pedialyte supplementation  Continue holding lasix/aldactone  Continue lactulose  Continue tacro 0.5mg BID  Obtain paracentesis if sufficient pocket present  Daily cmp, tacro levels

## 2024-12-30 NOTE — SUBJECTIVE & OBJECTIVE
Review of Systems   Constitutional:  Negative for chills and fever.   Respiratory:  Negative for cough and shortness of breath.    Cardiovascular:  Positive for leg swelling. Negative for chest pain.   Gastrointestinal:  Positive for abdominal distention and abdominal pain (chronic).   Genitourinary:  Negative for dysuria.        +retention   Skin:  Negative for rash and wound.   Allergic/Immunologic: Positive for immunocompromised state.   Neurological:  Positive for headaches. Negative for numbness.   Psychiatric/Behavioral:  Positive for confusion and decreased concentration.        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    Arnold-Chiari malformation     Bacteremia 12/22/2023    Depression     Esophageal stricture     Essential tremor     Hypertension     Left bundle branch block     Liver fibrosis, transplanted liver 10/02/2018    Suggested on fibroscan 10/2/18    Migraine without aura     MVP (mitral valve prolapse)     Non-rheumatic mitral regurgitation 10/01/2018    Non-rheumatic tricuspid valve insufficiency 10/01/2018    Osteoporosis     Palliative care encounter 8/19/2024    Perforated abdominal viscus 09/04/2020    Recurrent urinary tract infection     Seizures     Shingles 2007    SIADH (syndrome of inappropriate ADH production)     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    Tricuspid valve prolapse     Urolithiasis     Von Gierke disease     s/p liver transplant       Past Surgical History:   Procedure Laterality Date    APPENDECTOMY  6/22/2007    APPLICATION OF WOUND VACUUM-ASSISTED CLOSURE DEVICE N/A 9/18/2020    Procedure: APPLICATION, WOUND VAC;  Surgeon: Zain Decker MD;  Location: [address];  Service: General;  Laterality: N/A;    COLONOSCOPY  5/13/2008    internal hemorrhoids    CRANIOTOMY      ESOPHAGOGASTRODUODENOSCOPY N/A 9/3/2020    Procedure: EGD (ESOPHAGOGASTRODUODENOSCOPY);  Surgeon: Tyrel Vergara MD;  Location: [facility];  Service:  Endoscopy;  Laterality: N/A;    ESOPHAGOGASTRODUODENOSCOPY N/A 12/22/2023    Procedure: EGD (ESOPHAGOGASTRODUODENOSCOPY);  Surgeon: Jhony James MD;  Location: [address]);  Service: Endoscopy;  Laterality: N/A;    EXPLORATORY LAPAROTOMY  2020    due to perforated stomach    LAMINECTOMY  3/2001    LIVER TRANSPLANT  9/23/2002    OSSICULAR RECONSTRUCTION  10/4/1995    RIGHT REPLACEMENT PROSTHESIS for cholesteatoma    THYMECTOMY  5/2/2007    TONSILLECTOMY, ADENOIDECTOMY  1/21/2004    TOTAL ABDOMINAL HYSTERECTOMY  3/31/1994       Review of patient's allergies indicates:   Allergen Reactions    Codeine Itching     Other reaction(s): Itching    Lipitor [atorvastatin] Other (See Comments)     Other reaction(s): Muscle pain  Muscle cranmps    Morphine Itching     Other reaction(s): nausea and vomiting     Zoloft [sertraline] Other (See Comments)     Tremors/muscle spasms         Tobacco Use    Smoking status: Never    Smokeless tobacco: Never   Substance and Sexual Activity    Alcohol use: No    Drug use: No    Sexual activity: Not on file       Medications Prior to Admission   Medication Sig Dispense Refill Last Dose/Taking    diphenhydrAMINE (BENADRYL) 25 mg capsule Take 25 mg by mouth daily as needed for Allergies.       furosemide (LASIX) 20 MG tablet Take 1 tablet (20 mg total) by mouth once daily. 30 tablet 11     levothyroxine (SYNTHROID) 75 MCG tablet Take 1 tablet (75 mcg total) by mouth once daily. 90 tablet 3     magnesium oxide (MAG-OX) 400 mg (241.3 mg magnesium) tablet TAKE 1 TABLET(400 MG) BY MOUTH TWICE DAILY 180 tablet 3     multivitamin (ONE DAILY MULTIVITAMIN) per tablet Take 1 tablet by mouth once daily.       pantoprazole (PROTONIX) 20 MG tablet Take 1 tablet (20 mg total) by mouth once daily. 90 tablet 3     pravastatin (PRAVACHOL) 20 MG tablet Take 1 tablet (20 mg total) by mouth once daily. Need OFFICE VISIT before next refill, last seen 9/2023. This will be the LAST Rx if visit is not  made. 90 tablet 1     ramipriL (ALTACE) 5 MG capsule TAKE 1 CAPSULE(5 MG) BY MOUTH EVERY DAY 90 capsule 3     spironolactone (ALDACTONE) 50 MG tablet Take 1 tablet (50 mg total) by mouth once daily. 30 tablet 11     tacrolimus (PROGRAF) 0.5 MG Cap Take 1 capsule (0.5 mg total) by mouth every 12 (twelve) hours. 180 capsule 3     venlafaxine (EFFEXOR-XR) 150 MG Cp24 Take 1 capsule (150 mg total) by mouth once daily. 90 capsule 3        Objective:     Vital Signs (Most Recent):  Temp: 98.5 °F (36.9 °C) (12/30/24 1139)  Pulse: 73 (12/30/24 1139)  Resp: 16 (12/30/24 0709)  BP: (!) 131/58 (12/30/24 1139)  SpO2: 96 % (12/30/24 1139) Vital Signs (24h Range):  Temp:  [98 °F (36.7 °C)-98.5 °F (36.9 °C)] 98.5 °F (36.9 °C)  Pulse:  [65-75] 73  Resp:  [15-20] 16  SpO2:  [96 %-100 %] 96 %  BP: (116-172)/(57-73) 131/58     Weight: 56.8 kg (125 lb 1.8 oz) (12/30/24 0528)  Body mass index is 25.27 kg/m².       Physical Exam  Constitutional:       General: She is not in acute distress.     Appearance: She is not toxic-appearing.   HENT:      Head: Normocephalic and atraumatic.      Mouth/Throat:      Mouth: Mucous membranes are moist.      Pharynx: Oropharynx is clear.   Eyes:      General: No scleral icterus.     Extraocular Movements: Extraocular movements intact.   Cardiovascular:      Rate and Rhythm: Normal rate and regular rhythm.   Pulmonary:      Effort: Pulmonary effort is normal. No respiratory distress.      Breath sounds: No wheezing.   Abdominal:      General: There is distension.      Tenderness: There is abdominal tenderness in the right upper quadrant. There is no guarding or rebound.      Comments: Pt states chronic and no worsening of pain   Musculoskeletal:      Right lower leg: Edema present.      Left lower leg: Edema present.   Skin:     General: Skin is warm and dry.   Neurological:      General: No focal deficit present.      Mental Status: She is alert.   Psychiatric:         Mood and Affect: Mood normal.          Behavior: Behavior normal.      Comments: Answers questions appropriately            MELD 3.0: 29 at 12/30/2024  6:00 AM  MELD-Na: 28 at 12/30/2024  6:00 AM  Calculated from:  Serum Creatinine: 1.6 mg/dL at 12/30/2024  6:00 AM  Serum Sodium: 122 mmol/L (Using min of 125 mmol/L) at 12/30/2024  6:00 AM  Total Bilirubin: 4.6 mg/dL at 12/30/2024  6:00 AM  Serum Albumin: 2.1 g/dL at 12/30/2024  6:00 AM  INR(ratio): 1.3 at 12/29/2024  9:47 AM  Age at listing (hypothetical): 54 years  Sex: Female at 12/30/2024  6:00 AM      Significant Labs:  Labs within the past month have been reviewed.    Significant Imaging:  Reviewed

## 2024-12-30 NOTE — HPI
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 Nielsen, gastric perforation 9/2020 with prolonged hospital stay, SIADH, hyponatremia with multiple hospital stays presenting with hyponatremia at 118 and her ammonia was also elevated at 100. Pt underwent abdominal Us demonstrating new occlusive thrombus in main portal vein with collaterals, not seen in September 2024. Pt was determined to not be a liver re-transplant in 6/2024 due to complicated anatomy. Pt endorses headache, some mild memory issues, chronic belly pain, but no worsening, trouble with urination. Pt also with worsening kidney function noted on most recent labs. Pt started on lactulose, continued tacro level. Hepatology consulted for immunosuppresion

## 2024-12-30 NOTE — ASSESSMENT & PLAN NOTE
Patient with known history of cirrhosis.    MELD-Na score calculated; MELD 3.0: 29 at 12/30/2024  6:00 AM  MELD-Na: 28 at 12/30/2024  6:00 AM  Calculated from:  Serum Creatinine: 1.6 mg/dL at 12/30/2024  6:00 AM  Serum Sodium: 122 mmol/L (Using min of 125 mmol/L) at 12/30/2024  6:00 AM  Total Bilirubin: 4.6 mg/dL at 12/30/2024  6:00 AM  Serum Albumin: 2.1 g/dL at 12/30/2024  6:00 AM  INR(ratio): 1.3 at 12/29/2024  9:47 AM  Age at listing (hypothetical): 54 years  Sex: Female at 12/30/2024  6:00 AM      Plan:  -- Continue lactulose TID; titrate to 3-4 bowel movements per day.   -- Avoid any hepatotoxic meds  -- Monitor CBC/CMP/INR for synthetic function.  -- IM6 team consulted for possible diagnostic paracentesis; bedside US showed no available fluid pocket to drain; abdominal distension likely associated with gas. Suspicion for SBP at this time is low but will consider CTX/albumin for ppx if warranted.

## 2024-12-30 NOTE — CONSULTS
[facility] - Transplant [facility] Medicine  Consult Note    Patient Name: Elda Hernandez  MRN: [id number]  Admission Date: 12/29/2024  Hospital Length of Stay: 1 days  Attending Physician: Jakob Shelton, *   Primary Care Provider: David Lorenzo MD     Patient information was obtained from patient, past medical records, ER records, and primary team.     Inpatient consult to Hospital Medicine-General  Consult performed by: Khoa Parks MD  Consult ordered by: Jarod Nieto DO      Subjective   Principal Problem: Hyponatremia    Chief Complaint:   Chief Complaint   Patient presents with    Abnormal Lab     Recent blood work yesterday, told sodium is 118, Liver Transplant 2002.         HPI: Ms. 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 She presents to [facility] ED today with her spouse d/o hyponatremia, she gets routine lab, the recent lab shows sodium of 118 and she was told by her provider to come to the ED. Her chronic medications include Lasix, spironolactone, tacrolimus, levothyroxine, venlafaxine, ramipril, and pantoprazole. The patient reported that she has not been taking her antidiuretic and antihypertensive medications recently, except for Lasix, due to concerns about hyponatremia. She appears slightly confused, though this seems consistent with her baseline. She denies chest pain, fever, or chills.    In the ED, Afebrile, SBP slightly elevated (160), otherwise vitals were stable. Labs were remarkable for Na- 121, K-5.1, Tbil- 5.3, Alb- 2.4, ALP- 438, AST- 97, ALT- 49,  PT- 14.4. Urine Osm pending.    Admit to hospital medicine for further work-up and management of hyponatremia.    Hospital medicine consults team consulted for paracentesis given abdominal distension & tenderness.    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    Arnold-Chiari malformation     Bacteremia 12/22/2023    Depression     Esophageal stricture     Essential tremor     Hypertension     Left bundle branch block     Liver fibrosis, transplanted liver 10/02/2018    Suggested on fibroscan 10/2/18    Migraine without aura     MVP (mitral valve prolapse)     Non-rheumatic mitral regurgitation 10/01/2018    Non-rheumatic tricuspid valve insufficiency 10/01/2018    Osteoporosis     Palliative care encounter 8/19/2024    Perforated abdominal viscus 09/04/2020    Recurrent urinary tract infection     Seizures     Shingles 2007    SIADH (syndrome of inappropriate ADH production)     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    Tricuspid valve prolapse     Urolithiasis     Von Gierke disease     s/p liver transplant       Past Surgical History:   Procedure Laterality Date    APPENDECTOMY  6/22/2007    APPLICATION OF WOUND VACUUM-ASSISTED CLOSURE DEVICE N/A 9/18/2020    Procedure: APPLICATION, WOUND VAC;  Surgeon: Zain Decker MD;  Location: [facility] OR [address] [place] [address];  Service: General;  Laterality: N/A;    COLONOSCOPY  5/13/2008    internal hemorrhoids    CRANIOTOMY      ESOPHAGOGASTRODUODENOSCOPY N/A 9/3/2020    Procedure: EGD (ESOPHAGOGASTRODUODENOSCOPY);  Surgeon: Tyrel Vergara MD;  Location: [facility];  Service: Endoscopy;  Laterality: N/A;    ESOPHAGOGASTRODUODENOSCOPY N/A 12/22/2023    Procedure: EGD (ESOPHAGOGASTRODUODENOSCOPY);  Surgeon: Jhony James MD;  Location: [address]);  Service: Endoscopy;  Laterality: N/A;    EXPLORATORY LAPAROTOMY  2020    due to perforated stomach    LAMINECTOMY  3/2001    LIVER TRANSPLANT  9/23/2002    OSSICULAR RECONSTRUCTION   10/4/1995    RIGHT REPLACEMENT PROSTHESIS for cholesteatoma    THYMECTOMY  5/2/2007    TONSILLECTOMY, ADENOIDECTOMY  1/21/2004    TOTAL ABDOMINAL HYSTERECTOMY  3/31/1994       Review of patient's allergies indicates:   Allergen Reactions    Codeine Itching     Other reaction(s): Itching    Lipitor [atorvastatin] Other (See Comments)     Other reaction(s): Muscle pain  Muscle cranmps    Morphine Itching     Other reaction(s): nausea and vomiting     Zoloft [sertraline] Other (See Comments)     Tremors/muscle spasms       No current facility-administered medications on file prior to encounter.     Current Outpatient Medications on File Prior to Encounter   Medication Sig    diphenhydrAMINE (BENADRYL) 25 mg capsule Take 25 mg by mouth daily as needed for Allergies.    furosemide (LASIX) 20 MG tablet Take 1 tablet (20 mg total) by mouth once daily.    levothyroxine (SYNTHROID) 75 MCG tablet Take 1 tablet (75 mcg total) by mouth once daily.    magnesium oxide (MAG-OX) 400 mg (241.3 mg magnesium) tablet TAKE 1 TABLET(400 MG) BY MOUTH TWICE DAILY    multivitamin (ONE DAILY MULTIVITAMIN) per tablet Take 1 tablet by mouth once daily.    pantoprazole (PROTONIX) 20 MG tablet Take 1 tablet (20 mg total) by mouth once daily.    pravastatin (PRAVACHOL) 20 MG tablet Take 1 tablet (20 mg total) by mouth once daily. Need OFFICE VISIT before next refill, last seen 9/2023. This will be the LAST Rx if visit is not made.    ramipriL (ALTACE) 5 MG capsule TAKE 1 CAPSULE(5 MG) BY MOUTH EVERY DAY    spironolactone (ALDACTONE) 50 MG tablet Take 1 tablet (50 mg total) by mouth once daily.    tacrolimus (PROGRAF) 0.5 MG Cap Take 1 capsule (0.5 mg total) by mouth every 12 (twelve) hours.    venlafaxine (EFFEXOR-XR) 150 MG Cp24 Take 1 capsule (150 mg total) by mouth once daily.    [DISCONTINUED] lactulose (CHRONULAC) 10 gram/15 mL solution Take 15 mLs (10 g total) by mouth 3 (three) times daily.     Family History       Problem Relation (Age of  Onset)    Heart disease Mother    No Known Problems Father    Stroke Mother          Tobacco Use    Smoking status: Never    Smokeless tobacco: Never   Substance and Sexual Activity    Alcohol use: No    Drug use: No    Sexual activity: Not on file     Review of Systems   Constitutional:  Negative for fever.   HENT:  Negative for congestion.    Respiratory:  Negative for shortness of breath.    Gastrointestinal:  Positive for abdominal distention and abdominal pain. Negative for vomiting.     Objective:     Vital Signs (Most Recent):  Temp: 98.5 °F (36.9 °C) (12/30/24 1139)  Pulse: 73 (12/30/24 1139)  Resp: 16 (12/30/24 0709)  BP: (!) 131/58 (12/30/24 1139)  SpO2: 96 % (12/30/24 1139) Vital Signs (24h Range):  Temp:  [98 °F (36.7 °C)-98.5 °F (36.9 °C)] 98.5 °F (36.9 °C)  Pulse:  [65-75] 73  Resp:  [15-18] 16  SpO2:  [96 %-100 %] 96 %  BP: (116-154)/(57-69) 131/58     Weight: 56.8 kg (125 lb 1.8 oz)  Body mass index is 25.27 kg/m².     Physical Exam  Vitals and nursing note reviewed.   Constitutional:       General: She is not in acute distress.     Appearance: She is ill-appearing (Chronically ill-looking).   HENT:      Head: Normocephalic and atraumatic.      Mouth/Throat:      Mouth: Mucous membranes are moist.   Eyes:      General: Scleral icterus present.   Cardiovascular:      Rate and Rhythm: Normal rate and regular rhythm.      Pulses: Normal pulses.      Heart sounds: Normal heart sounds.   Pulmonary:      Effort: Pulmonary effort is normal. No respiratory distress.      Breath sounds: Normal breath sounds.   Abdominal:      General: There is distension.      Tenderness: There is abdominal tenderness.   Musculoskeletal:      Right lower leg: No edema.      Left lower leg: No edema.   Neurological:      Mental Status: She is alert.          Significant Labs: All pertinent labs within the past 24 hours have been reviewed.    Significant Imaging: I have reviewed all pertinent imaging results/findings within the  past 24 hours.    Assessment and Plan   * Abdominal distension  H/o von Gierke s/p liver trx w/ cirrhosis & h/o ascites s/p IR paracentesis in 08/2024; admitted for chronic hyponatremia & hepatic encephalopathy.  US liver w/ Doppler on 12/29 w/o fluid collections; bedside US confirmed no peritoneal free fluid, so no fluid pocket for paracentesis; noted prominent bowel gas & stool.  Suspect abdominal distension/tenderness 2/2 bowel gas/stool burden.  Pt reported last BM at 0300 this AM; receiving lactulose per primary team.  Low suspicion for SBP given no visible fluid on bedside US.    No paracentesis 2/2 no ascitic fluid to drain  Titrate lactulose to 3 BMs daily  Remainder of care per Primary & Hepatology teams    VTE Risk Mitigation (From admission, onward)           Ordered     heparin (porcine) injection 5,000 Units  Every 8 hours         12/29/24 1331     Place sequential compression device  Until discontinued         12/29/24 1331     IP VTE LOW RISK PATIENT  Once         12/29/24 1331     Place sequential compression device  Until discontinued         12/29/24 1254                  Thank you for your consult. I will sign off. Please contact us if you have any additional questions.    Khoa Parks MD  Department of Hospital Medicine   [facility] - Transplant Stepdown

## 2024-12-30 NOTE — SUBJECTIVE & OBJECTIVE
Interval History: Pt reporting some mild confusion today but is overall doing well.     Review of Systems   Constitutional:  Negative for fever.   HENT:  Negative for congestion.    Eyes: Negative.    Respiratory:  Negative for shortness of breath.    Gastrointestinal:  Positive for abdominal distention and abdominal pain. Negative for vomiting.   Endocrine: Negative.    Genitourinary: Negative.    Musculoskeletal: Negative.    Skin: Negative.    Allergic/Immunologic: Negative.    Neurological: Negative.    Hematological: Negative.    Psychiatric/Behavioral:  Positive for confusion.      Objective:     Vital Signs (Most Recent):  Temp: 98.5 °F (36.9 °C) (12/30/24 1139)  Pulse: 73 (12/30/24 1139)  Resp: 16 (12/30/24 0709)  BP: (!) 131/58 (12/30/24 1139)  SpO2: 96 % (12/30/24 1139) Vital Signs (24h Range):  Temp:  [98 °F (36.7 °C)-98.5 °F (36.9 °C)] 98.5 °F (36.9 °C)  Pulse:  [65-75] 73  Resp:  [15-18] 16  SpO2:  [96 %-100 %] 96 %  BP: (116-154)/(57-69) 131/58     Weight: 56.8 kg (125 lb 1.8 oz)  Body mass index is 25.27 kg/m².    Intake/Output Summary (Last 24 hours) at 12/30/2024 1349  Last data filed at 12/30/2024 0759  Gross per 24 hour   Intake 100 ml   Output 350 ml   Net -250 ml         Physical Exam  Vitals and nursing note reviewed.   Constitutional:       General: She is not in acute distress.     Appearance: She is ill-appearing (Chronically ill-looking).   HENT:      Head: Normocephalic and atraumatic.      Mouth/Throat:      Mouth: Mucous membranes are moist.   Cardiovascular:      Rate and Rhythm: Normal rate and regular rhythm.   Pulmonary:      Effort: Pulmonary effort is normal. No respiratory distress.   Abdominal:      General: There is distension.   Musculoskeletal:      Right lower leg: No edema.      Left lower leg: No edema.   Skin:     General: Skin is warm and dry.   Neurological:      General: No focal deficit present.      Mental Status: She is alert and oriented to person, place, and time.    Psychiatric:         Mood and Affect: Mood normal.         Behavior: Behavior normal.             Significant Labs: All pertinent labs within the past 24 hours have been reviewed.    Significant Imaging: I have reviewed all pertinent imaging results/findings within the past 24 hours.

## 2024-12-30 NOTE — ASSESSMENT & PLAN NOTE
known liver transplant in 9/2002.     Plan:  -- Hepatology consulted to assist in immunosuppressive medicine management; recommending continuing home tacrolimus 0.5mg BID  -- Daily tacrolimus level and CMP

## 2024-12-30 NOTE — TELEPHONE ENCOUNTER
No care due was identified.  [facility] Embedded Care Due Messages. Reference number: [id number].   12/29/2024 8:15:47 PM CST

## 2024-12-30 NOTE — HOSPITAL COURSE
Pt admitted to hospital medicine for further workup and management of hyponatremia likely associated with hypovolemia vs cirrhosis vs medication noncompliance vs SSRI use. IM6 consulted for diagnostic paracentesis but no fluid pocket was noted on US. 500 cc NS bolus given and fluid restriction liberalized from 800 mL daily to 1.5L daily. Nephrology consulted for JACQUE and recommended mei catheter placement which pateint declined; also recommended holding diuretics in the setting of hyponatremia. Hepatology consulted for recommendations on management of tacrolimus who recommends continuing patient's home dose 0.5mg BID. Pt required 2 straight catheterizations since admission but declined mei. Repeat urine studies ordered after hyponatremia did not significantly improve, again not concerning for SIADH. Na improving with administration of albumin, fluid restriction discontinued. 1 unit of pRBCs given as patient found to have symptomatic anemia on 1/2/25. Albumin course completed.     Patient going to be discharged with refill of Lactulose with goal to have 2-3 BM's for hepatic encephalopathy. Discussed with nephrology diuretics regimen of Aldactone 25 mg daily with plans to follow up with Dr. Jarvis in clinic 2-3 weeks from now with labs. Will send Rifaximin as well to pharmacy for HE in addition to the lactulose. Patient not a transplant candidate per hepatology due to extensive surgical history. They discussed with her being evaluated at an alternate transplant center for any possible plans for intervention. Patient verbalized understanding and had no further questions. Soft Dysphagia diet with 1.2-1.5 L fluid restriction.

## 2024-12-30 NOTE — PROGRESS NOTES
[facility] - Transplant [facility] Medicine  Progress Note    Patient Name: Elda Hernandez  MRN: [id number]  Patient Class: IP- Inpatient   Admission Date: 12/29/2024  Length of Stay: 1 days  Attending Physician: Jakob Shelton, *  Primary Care Provider: David Lorenzo MD        Subjective     Principal Problem:Hyponatremia        HPI:  Ms. 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 She presents to [facility] ED today with her spouse d/o hyponatremia, she gets routine lab, the recent lab shows sodium of 118 and she was told by her provider to come to the ED. Her chronic medications include Lasix, spironolactone, tacrolimus, levothyroxine, venlafaxine, ramipril, and pantoprazole. The patient reported that she has not been taking her antidiuretic and antihypertensive medications recently, except for Lasix, due to concerns about hyponatremia. She appears slightly confused, though this seems consistent with her baseline. She denies chest pain, fever, or chills.    In the ED, Afebrile, SBP slightly elevated (160), otherwise vitals were stable. Labs were remarkable for Na- 121, K-5.1, Tbil- 5.3, Alb- 2.4, ALP- 438, AST- 97, ALT- 49, PT- 14.4. Urine Osm pending.    Admit to hospital medicine for further work-up and management of hyponatremia.      Overview/Hospital Course:  Pt admitted to hospital medicine for further workup and management of hyponatremia likely associated with hypovolemia vs cirrhosis vs medication noncompliance vs SSRI use. IM6 consulted for diagnostic paracentesis but  no fluid pocket was noted on US. 500 cc NS bolus given and fluid restriction liberalized from 800 mL daily to 1.5L daily. Hepatology consulted for recommendations on management of tacrolimus who recommends continuing patient's home dose 0.5mg BID.     Interval History: Pt reporting some mild confusion today but is overall doing well.     Review of Systems   Constitutional:  Negative for fever.   HENT:  Negative for congestion.    Eyes: Negative.    Respiratory:  Negative for shortness of breath.    Gastrointestinal:  Positive for abdominal distention and abdominal pain. Negative for vomiting.   Endocrine: Negative.    Genitourinary: Negative.    Musculoskeletal: Negative.    Skin: Negative.    Allergic/Immunologic: Negative.    Neurological: Negative.    Hematological: Negative.    Psychiatric/Behavioral:  Positive for confusion.      Objective:     Vital Signs (Most Recent):  Temp: 98.5 °F (36.9 °C) (12/30/24 1139)  Pulse: 73 (12/30/24 1139)  Resp: 16 (12/30/24 0709)  BP: (!) 131/58 (12/30/24 1139)  SpO2: 96 % (12/30/24 1139) Vital Signs (24h Range):  Temp:  [98 °F (36.7 °C)-98.5 °F (36.9 °C)] 98.5 °F (36.9 °C)  Pulse:  [65-75] 73  Resp:  [15-18] 16  SpO2:  [96 %-100 %] 96 %  BP: (116-154)/(57-69) 131/58     Weight: 56.8 kg (125 lb 1.8 oz)  Body mass index is 25.27 kg/m².    Intake/Output Summary (Last 24 hours) at 12/30/2024 1349  Last data filed at 12/30/2024 0759  Gross per 24 hour   Intake 100 ml   Output 350 ml   Net -250 ml         Physical Exam  Vitals and nursing note reviewed.   Constitutional:       General: She is not in acute distress.     Appearance: She is ill-appearing (Chronically ill-looking).   HENT:      Head: Normocephalic and atraumatic.      Mouth/Throat:      Mouth: Mucous membranes are moist.   Cardiovascular:      Rate and Rhythm: Normal rate and regular rhythm.   Pulmonary:      Effort: Pulmonary effort is normal. No respiratory distress.   Abdominal:      General: There is distension.    Musculoskeletal:      Right lower leg: No edema.      Left lower leg: No edema.   Skin:     General: Skin is warm and dry.   Neurological:      General: No focal deficit present.      Mental Status: She is alert and oriented to person, place, and time.   Psychiatric:         Mood and Affect: Mood normal.         Behavior: Behavior normal.             Significant Labs: All pertinent labs within the past 24 hours have been reviewed.    Significant Imaging: I have reviewed all pertinent imaging results/findings within the past 24 hours.    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     The patient's most recent sodium results are listed below.  Recent Labs     12/29/24  1542 12/30/24  0104 12/30/24  0600   * 124* 122*       Plan  - Correct the sodium by 4-6mEq in 24 hours.   - Obtain the following studies: Urine sodium, urine osmolality, serum osmolality.  - Will treat the hyponatremia with IV fluids as needed and Fluid restriction of:  1.5 liter per day.  - 500cc one time bolus of NS given.  - Monitor sodium Every 12 hours.         Cirrhosis of liver with ascites  Patient with known history of cirrhosis.    MELD-Na score calculated; MELD 3.0: 29 at 12/30/2024  6:00 AM  MELD-Na: 28 at 12/30/2024  6:00 AM  Calculated from:  Serum Creatinine: 1.6 mg/dL at 12/30/2024  6:00 AM  Serum Sodium: 122 mmol/L (Using min of 125 mmol/L) at 12/30/2024  6:00 AM  Total Bilirubin: 4.6 mg/dL at 12/30/2024  6:00 AM  Serum Albumin: 2.1 g/dL at 12/30/2024  6:00 AM  INR(ratio):  1.3 at 12/29/2024  9:47 AM  Age at listing (hypothetical): 54 years  Sex: Female at 12/30/2024  6:00 AM      Plan:  -- Continue lactulose TID; titrate to 3-4 bowel movements per day.   -- Avoid any hepatotoxic meds  -- Monitor CBC/CMP/INR for synthetic function.  -- IM6 team consulted for possible diagnostic paracentesis; bedside US showed no available fluid pocket to drain; abdominal distension likely associated with gas. Suspicion for SBP at this time is low but will consider CTX/albumin for ppx if warranted.     Headache  -- PRN tylenol   -- Will consider migraine cocktail if needed      RUQ abdominal pain  Chronic RUQ pain:    Plan:  -- Pain medication as appropriate    Acquired hypothyroidism  -- Continue home Levothyroxine    S/P liver transplant 9/23/2002 for von Gierke disease  known liver transplant in 9/2002.     Plan:  -- Hepatology consulted to assist in immunosuppressive medicine management; recommending continuing home tacrolimus 0.5mg BID  -- Daily tacrolimus level and CMP        VTE Risk Mitigation (From admission, onward)           Ordered     heparin (porcine) injection 5,000 Units  Every 8 hours         12/29/24 1331     Place sequential compression device  Until discontinued         12/29/24 1331     IP VTE LOW RISK PATIENT  Once         12/29/24 1331     Place sequential compression device  Until discontinued         12/29/24 1254                    Discharge Planning   MARILEE: 12/31/2024     Code Status: Full Code   Medical Readiness for Discharge Date:                Janet Casillas DO  Department of Hospital Medicine   [facility] - Transplant Stepdown

## 2024-12-30 NOTE — TELEPHONE ENCOUNTER
Refill Routing Note   Medication(s) are not appropriate for processing by [facility] for the following reason(s):        Outside of protocol    [facility] action(s):  Route             Appointments  past 12m or future 3m with PCP    Date Provider   Last Visit   8/21/2024 David Lorenzo MD   Next Visit   Visit date not found David Lorenzo MD   ED visits in past 90 days: 0        Note composed:1:52 PM 12/30/2024

## 2024-12-30 NOTE — SUBJECTIVE & OBJECTIVE
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    Arnold-Chiari malformation     Bacteremia 12/22/2023    Depression     Esophageal stricture     Essential tremor     Hypertension     Left bundle branch block     Liver fibrosis, transplanted liver 10/02/2018    Suggested on fibroscan 10/2/18    Migraine without aura     MVP (mitral valve prolapse)     Non-rheumatic mitral regurgitation 10/01/2018    Non-rheumatic tricuspid valve insufficiency 10/01/2018    Osteoporosis     Palliative care encounter 8/19/2024    Perforated abdominal viscus 09/04/2020    Recurrent urinary tract infection     Seizures     Shingles 2007    SIADH (syndrome of inappropriate ADH production)     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    Tricuspid valve prolapse     Urolithiasis     Von Gierke disease     s/p liver transplant       Past Surgical History:   Procedure Laterality Date    APPENDECTOMY  6/22/2007    APPLICATION OF WOUND VACUUM-ASSISTED CLOSURE DEVICE N/A 9/18/2020    Procedure: APPLICATION, WOUND VAC;  Surgeon: Zain Decker MD;  Location: [facility] OR [address] [place] [address];  Service: General;  Laterality: N/A;    COLONOSCOPY  5/13/2008    internal hemorrhoids    CRANIOTOMY      ESOPHAGOGASTRODUODENOSCOPY N/A 9/3/2020    Procedure: EGD (ESOPHAGOGASTRODUODENOSCOPY);  Surgeon: Tyrel Vergara MD;  Location: [facility];  Service: Endoscopy;  Laterality: N/A;    ESOPHAGOGASTRODUODENOSCOPY N/A 12/22/2023    Procedure: EGD (ESOPHAGOGASTRODUODENOSCOPY);  Surgeon: Jhony James MD;  Location: [address]);  Service: Endoscopy;  Laterality: N/A;    EXPLORATORY LAPAROTOMY  2020    due to perforated stomach    LAMINECTOMY  3/2001    LIVER TRANSPLANT  9/23/2002    OSSICULAR RECONSTRUCTION  10/4/1995    RIGHT REPLACEMENT PROSTHESIS for cholesteatoma    THYMECTOMY  5/2/2007    TONSILLECTOMY, ADENOIDECTOMY  1/21/2004    TOTAL ABDOMINAL HYSTERECTOMY  3/31/1994       Review of patient's allergies indicates:    Allergen Reactions    Codeine Itching     Other reaction(s): Itching    Lipitor [atorvastatin] Other (See Comments)     Other reaction(s): Muscle pain  Muscle cranmps    Morphine Itching     Other reaction(s): nausea and vomiting     Zoloft [sertraline] Other (See Comments)     Tremors/muscle spasms       No current facility-administered medications on file prior to encounter.     Current Outpatient Medications on File Prior to Encounter   Medication Sig    diphenhydrAMINE (BENADRYL) 25 mg capsule Take 25 mg by mouth daily as needed for Allergies.    furosemide (LASIX) 20 MG tablet Take 1 tablet (20 mg total) by mouth once daily.    levothyroxine (SYNTHROID) 75 MCG tablet Take 1 tablet (75 mcg total) by mouth once daily.    magnesium oxide (MAG-OX) 400 mg (241.3 mg magnesium) tablet TAKE 1 TABLET(400 MG) BY MOUTH TWICE DAILY    multivitamin (ONE DAILY MULTIVITAMIN) per tablet Take 1 tablet by mouth once daily.    pantoprazole (PROTONIX) 20 MG tablet Take 1 tablet (20 mg total) by mouth once daily.    pravastatin (PRAVACHOL) 20 MG tablet Take 1 tablet (20 mg total) by mouth once daily. Need OFFICE VISIT before next refill, last seen 9/2023. This will be the LAST Rx if visit is not made.    ramipriL (ALTACE) 5 MG capsule TAKE 1 CAPSULE(5 MG) BY MOUTH EVERY DAY    spironolactone (ALDACTONE) 50 MG tablet Take 1 tablet (50 mg total) by mouth once daily.    tacrolimus (PROGRAF) 0.5 MG Cap Take 1 capsule (0.5 mg total) by mouth every 12 (twelve) hours.    venlafaxine (EFFEXOR-XR) 150 MG Cp24 Take 1 capsule (150 mg total) by mouth once daily.    [DISCONTINUED] lactulose (CHRONULAC) 10 gram/15 mL solution Take 15 mLs (10 g total) by mouth 3 (three) times daily.     Family History       Problem Relation (Age of Onset)    Heart disease Mother    No Known Problems Father    Stroke Mother          Tobacco Use    Smoking status: Never    Smokeless tobacco: Never   Substance and Sexual Activity    Alcohol use: No    Drug use: No     Sexual activity: Not on file     Review of Systems   Constitutional:  Negative for fever.   HENT:  Negative for congestion.    Respiratory:  Negative for shortness of breath.    Gastrointestinal:  Positive for abdominal distention and abdominal pain. Negative for vomiting.     Objective:     Vital Signs (Most Recent):  Temp: 98.5 °F (36.9 °C) (12/30/24 1139)  Pulse: 73 (12/30/24 1139)  Resp: 16 (12/30/24 0709)  BP: (!) 131/58 (12/30/24 1139)  SpO2: 96 % (12/30/24 1139) Vital Signs (24h Range):  Temp:  [98 °F (36.7 °C)-98.5 °F (36.9 °C)] 98.5 °F (36.9 °C)  Pulse:  [65-75] 73  Resp:  [15-18] 16  SpO2:  [96 %-100 %] 96 %  BP: (116-154)/(57-69) 131/58     Weight: 56.8 kg (125 lb 1.8 oz)  Body mass index is 25.27 kg/m².     Physical Exam  Vitals and nursing note reviewed.   Constitutional:       General: She is not in acute distress.     Appearance: She is ill-appearing (Chronically ill-looking).   HENT:      Head: Normocephalic and atraumatic.      Mouth/Throat:      Mouth: Mucous membranes are moist.   Eyes:      General: Scleral icterus present.   Cardiovascular:      Rate and Rhythm: Normal rate and regular rhythm.      Pulses: Normal pulses.      Heart sounds: Normal heart sounds.   Pulmonary:      Effort: Pulmonary effort is normal. No respiratory distress.      Breath sounds: Normal breath sounds.   Abdominal:      General: There is distension.      Tenderness: There is abdominal tenderness.   Musculoskeletal:      Right lower leg: No edema.      Left lower leg: No edema.   Neurological:      Mental Status: She is alert.          Significant Labs: All pertinent labs within the past 24 hours have been reviewed.    Significant Imaging: I have reviewed all pertinent imaging results/findings within the past 24 hours.

## 2024-12-30 NOTE — TELEPHONE ENCOUNTER
Liver Transplant Committee Discussion     Patient Name: Elda Hernandez   : 1970  MRN: [id number]    Requested by: Maria Esther Mcfadden MD    Day to be discussed: Liver discussion days: Monday     Transplant Coordinator: Liver Coordinators: Maude Gibson    Patient Status: inpatient    Transplant Status: transplant status: Post-Liver    Reason for Discussion: pt is s/p OLT  with recurrent cirrhosis and complicated abdomen, was declined for REDO here 24; recommendation for another txp center for 2nd opinion?    Plan: Recommend patient reaches out to [facility] and [facility] for 2nd opinion on re-transplantation    Route to: DR. Miguel and Maude Gibson

## 2024-12-30 NOTE — ASSESSMENT & PLAN NOTE
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     The patient's most recent sodium results are listed below.  Recent Labs     12/29/24  1542 12/30/24  0104 12/30/24  0600   * 124* 122*       Plan  - Correct the sodium by 4-6mEq in 24 hours.   - Obtain the following studies: Urine sodium, urine osmolality, serum osmolality.  - Will treat the hyponatremia with IV fluids as needed and Fluid restriction of:  1.5 liter per day.  - 500cc one time bolus of NS given.  - Monitor sodium Every 12 hours.

## 2024-12-31 PROBLEM — N17.9 AKI (ACUTE KIDNEY INJURY): Status: ACTIVE | Noted: 2024-12-31

## 2024-12-31 LAB
ALBUMIN SERPL BCP-MCNC: 2.8 G/DL (ref 3.5–5.2)
ALP SERPL-CCNC: 321 U/L (ref 40–150)
ALT SERPL W/O P-5'-P-CCNC: 31 U/L (ref 10–44)
ANION GAP SERPL CALC-SCNC: 10 MMOL/L (ref 8–16)
ANION GAP SERPL CALC-SCNC: 5 MMOL/L (ref 8–16)
ANION GAP SERPL CALC-SCNC: 6 MMOL/L (ref 8–16)
ANION GAP SERPL CALC-SCNC: 6 MMOL/L (ref 8–16)
ANION GAP SERPL CALC-SCNC: 7 MMOL/L (ref 8–16)
AST SERPL-CCNC: 55 U/L (ref 10–40)
BASOPHILS # BLD AUTO: 0.03 K/UL (ref 0–0.2)
BASOPHILS NFR BLD: 0.8 % (ref 0–1.9)
BILIRUB SERPL-MCNC: 5.2 MG/DL (ref 0.1–1)
BUN SERPL-MCNC: 24 MG/DL (ref 6–20)
BUN SERPL-MCNC: 26 MG/DL (ref 6–20)
BUN SERPL-MCNC: 28 MG/DL (ref 6–20)
BUN SERPL-MCNC: 28 MG/DL (ref 6–20)
BUN SERPL-MCNC: 30 MG/DL (ref 6–20)
CALCIUM SERPL-MCNC: 8.6 MG/DL (ref 8.7–10.5)
CALCIUM SERPL-MCNC: 8.7 MG/DL (ref 8.7–10.5)
CALCIUM SERPL-MCNC: 8.9 MG/DL (ref 8.7–10.5)
CALCIUM SERPL-MCNC: 8.9 MG/DL (ref 8.7–10.5)
CALCIUM SERPL-MCNC: 9.2 MG/DL (ref 8.7–10.5)
CHLORIDE SERPL-SCNC: 94 MMOL/L (ref 95–110)
CHLORIDE SERPL-SCNC: 94 MMOL/L (ref 95–110)
CHLORIDE SERPL-SCNC: 95 MMOL/L (ref 95–110)
CHLORIDE SERPL-SCNC: 95 MMOL/L (ref 95–110)
CHLORIDE SERPL-SCNC: 96 MMOL/L (ref 95–110)
CO2 SERPL-SCNC: 19 MMOL/L (ref 23–29)
CO2 SERPL-SCNC: 21 MMOL/L (ref 23–29)
CO2 SERPL-SCNC: 21 MMOL/L (ref 23–29)
CO2 SERPL-SCNC: 22 MMOL/L (ref 23–29)
CO2 SERPL-SCNC: 23 MMOL/L (ref 23–29)
CREAT SERPL-MCNC: 1.5 MG/DL (ref 0.5–1.4)
CREAT SERPL-MCNC: 1.6 MG/DL (ref 0.5–1.4)
DIFFERENTIAL METHOD BLD: ABNORMAL
EOSINOPHIL # BLD AUTO: 0.2 K/UL (ref 0–0.5)
EOSINOPHIL NFR BLD: 5.1 % (ref 0–8)
ERYTHROCYTE [DISTWIDTH] IN BLOOD BY AUTOMATED COUNT: 14.6 % (ref 11.5–14.5)
EST. GFR  (NO RACE VARIABLE): 37.6 ML/MIN/1.73 M^2
EST. GFR  (NO RACE VARIABLE): 40.6 ML/MIN/1.73 M^2
GLUCOSE SERPL-MCNC: 79 MG/DL (ref 70–110)
GLUCOSE SERPL-MCNC: 80 MG/DL (ref 70–110)
GLUCOSE SERPL-MCNC: 85 MG/DL (ref 70–110)
GLUCOSE SERPL-MCNC: 92 MG/DL (ref 70–110)
GLUCOSE SERPL-MCNC: 97 MG/DL (ref 70–110)
HCT VFR BLD AUTO: 23.8 % (ref 37–48.5)
HGB BLD-MCNC: 8.2 G/DL (ref 12–16)
IMM GRANULOCYTES # BLD AUTO: 0.02 K/UL (ref 0–0.04)
IMM GRANULOCYTES NFR BLD AUTO: 0.5 % (ref 0–0.5)
LYMPHOCYTES # BLD AUTO: 0.6 K/UL (ref 1–4.8)
LYMPHOCYTES NFR BLD: 16 % (ref 18–48)
MAGNESIUM SERPL-MCNC: 1.9 MG/DL (ref 1.6–2.6)
MCH RBC QN AUTO: 30.1 PG (ref 27–31)
MCHC RBC AUTO-ENTMCNC: 34.5 G/DL (ref 32–36)
MCV RBC AUTO: 88 FL (ref 82–98)
MONOCYTES # BLD AUTO: 0.8 K/UL (ref 0.3–1)
MONOCYTES NFR BLD: 20.3 % (ref 4–15)
NEUTROPHILS # BLD AUTO: 2.3 K/UL (ref 1.8–7.7)
NEUTROPHILS NFR BLD: 57.3 % (ref 38–73)
NRBC BLD-RTO: 0 /100 WBC
PHOSPHATE SERPL-MCNC: 3.3 MG/DL (ref 2.7–4.5)
PLATELET # BLD AUTO: 76 K/UL (ref 150–450)
PMV BLD AUTO: 10.6 FL (ref 9.2–12.9)
POTASSIUM SERPL-SCNC: 5.1 MMOL/L (ref 3.5–5.1)
POTASSIUM SERPL-SCNC: 5.1 MMOL/L (ref 3.5–5.1)
POTASSIUM SERPL-SCNC: 5.5 MMOL/L (ref 3.5–5.1)
POTASSIUM SERPL-SCNC: 5.5 MMOL/L (ref 3.5–5.1)
POTASSIUM SERPL-SCNC: 5.6 MMOL/L (ref 3.5–5.1)
PROT SERPL-MCNC: 5.9 G/DL (ref 6–8.4)
RBC # BLD AUTO: 2.72 M/UL (ref 4–5.4)
SODIUM SERPL-SCNC: 121 MMOL/L (ref 136–145)
SODIUM SERPL-SCNC: 121 MMOL/L (ref 136–145)
SODIUM SERPL-SCNC: 123 MMOL/L (ref 136–145)
SODIUM SERPL-SCNC: 124 MMOL/L (ref 136–145)
SODIUM SERPL-SCNC: 125 MMOL/L (ref 136–145)
TACROLIMUS BLD-MCNC: 5.5 NG/ML (ref 5–15)
WBC # BLD AUTO: 3.94 K/UL (ref 3.9–12.7)

## 2024-12-31 PROCEDURE — 36415 COLL VENOUS BLD VENIPUNCTURE: CPT

## 2024-12-31 PROCEDURE — 51798 US URINE CAPACITY MEASURE: CPT

## 2024-12-31 PROCEDURE — P9047 ALBUMIN (HUMAN), 25%, 50ML: HCPCS | Mod: JZ,JG

## 2024-12-31 PROCEDURE — 20600001 HC STEP DOWN PRIVATE ROOM

## 2024-12-31 PROCEDURE — 63600175 PHARM REV CODE 636 W HCPCS

## 2024-12-31 PROCEDURE — 25000003 PHARM REV CODE 250

## 2024-12-31 PROCEDURE — 80053 COMPREHEN METABOLIC PANEL: CPT

## 2024-12-31 PROCEDURE — 83735 ASSAY OF MAGNESIUM: CPT

## 2024-12-31 PROCEDURE — 63600175 PHARM REV CODE 636 W HCPCS: Mod: JZ,JG

## 2024-12-31 PROCEDURE — 84100 ASSAY OF PHOSPHORUS: CPT

## 2024-12-31 PROCEDURE — 85025 COMPLETE CBC W/AUTO DIFF WBC: CPT

## 2024-12-31 PROCEDURE — 80048 BASIC METABOLIC PNL TOTAL CA: CPT | Mod: 91

## 2024-12-31 PROCEDURE — 80197 ASSAY OF TACROLIMUS: CPT

## 2024-12-31 RX ORDER — OXYCODONE HYDROCHLORIDE 5 MG/1
5 TABLET ORAL ONCE
Status: COMPLETED | OUTPATIENT
Start: 2024-12-31 | End: 2024-12-31

## 2024-12-31 RX ORDER — ALBUMIN HUMAN 250 G/1000ML
25 SOLUTION INTRAVENOUS 3 TIMES DAILY
Status: DISCONTINUED | OUTPATIENT
Start: 2024-12-31 | End: 2025-01-03

## 2024-12-31 RX ORDER — CEFTRIAXONE 1 G/1
1 INJECTION, POWDER, FOR SOLUTION INTRAMUSCULAR; INTRAVENOUS DAILY
Status: COMPLETED | OUTPATIENT
Start: 2024-12-31 | End: 2025-01-04

## 2024-12-31 RX ORDER — CALCIUM CARBONATE 200(500)MG
500 TABLET,CHEWABLE ORAL 2 TIMES DAILY PRN
Status: DISCONTINUED | OUTPATIENT
Start: 2024-12-31 | End: 2025-01-06 | Stop reason: HOSPADM

## 2024-12-31 RX ADMIN — HEPARIN SODIUM 5000 UNITS: 5000 INJECTION INTRAVENOUS; SUBCUTANEOUS at 04:12

## 2024-12-31 RX ADMIN — ALBUMIN (HUMAN) 25 G: 12.5 SOLUTION INTRAVENOUS at 08:12

## 2024-12-31 RX ADMIN — ONDANSETRON 4 MG: 2 INJECTION INTRAMUSCULAR; INTRAVENOUS at 08:12

## 2024-12-31 RX ADMIN — VENLAFAXINE HYDROCHLORIDE 150 MG: 75 CAPSULE, EXTENDED RELEASE ORAL at 07:12

## 2024-12-31 RX ADMIN — CEFTRIAXONE 1 G: 1 INJECTION, POWDER, FOR SOLUTION INTRAMUSCULAR; INTRAVENOUS at 12:12

## 2024-12-31 RX ADMIN — OXYCODONE 5 MG: 5 TABLET ORAL at 09:12

## 2024-12-31 RX ADMIN — LACTULOSE 10 G: 20 SOLUTION ORAL at 07:12

## 2024-12-31 RX ADMIN — Medication 6 MG: at 09:12

## 2024-12-31 RX ADMIN — TACROLIMUS 0.5 MG: 0.5 CAPSULE ORAL at 07:12

## 2024-12-31 RX ADMIN — LACTULOSE 10 G: 20 SOLUTION ORAL at 02:12

## 2024-12-31 RX ADMIN — HEPARIN SODIUM 5000 UNITS: 5000 INJECTION INTRAVENOUS; SUBCUTANEOUS at 08:12

## 2024-12-31 RX ADMIN — LEVOTHYROXINE SODIUM 75 MCG: 75 TABLET ORAL at 07:12

## 2024-12-31 RX ADMIN — ALBUMIN (HUMAN) 25 G: 12.5 SOLUTION INTRAVENOUS at 07:12

## 2024-12-31 RX ADMIN — LACTULOSE 10 G: 20 SOLUTION ORAL at 08:12

## 2024-12-31 RX ADMIN — TACROLIMUS 0.5 MG: 0.5 CAPSULE ORAL at 06:12

## 2024-12-31 RX ADMIN — CALCIUM CARBONATE (ANTACID) CHEW TAB 500 MG 500 MG: 500 CHEW TAB at 02:12

## 2024-12-31 RX ADMIN — OXYCODONE 5 MG: 5 TABLET ORAL at 11:12

## 2024-12-31 RX ADMIN — HEPARIN SODIUM 5000 UNITS: 5000 INJECTION INTRAVENOUS; SUBCUTANEOUS at 02:12

## 2024-12-31 RX ADMIN — SODIUM ZIRCONIUM CYCLOSILICATE 5 G: 5 POWDER, FOR SUSPENSION ORAL at 07:12

## 2024-12-31 RX ADMIN — ALBUMIN (HUMAN) 25 G: 5 SOLUTION INTRAVENOUS at 08:12

## 2024-12-31 NOTE — PLAN OF CARE
[facility] - Transplant Stepdown  Initial Discharge Assessment       Primary Care Provider: David Lorenzo MD    Admission Diagnosis: Hyponatremia [E87.1]  Chest pain [R07.9]    Admission Date: 12/29/2024  Expected Discharge Date: 1/1/2025    Transition of Care Barriers: None    Payor: HU[employer] MANAGED MEDICARE / Plan: HU[employer] MEDICARE PPO / Product Type: Medicare Advantage /     Extended Emergency Contact Information  Primary Emergency Contact: Kody Hernandez  Address: [address] [place] [address] [place] [address] [facility]  Home Phone: [phone]  Mobile Phone: [phone]  Relation: Spouse    Discharge Plan A: Home with family  Discharge Plan B: Home, Home Health      [id number] - [place], [place] - [address] AT [address] [facility]  [address]  [address]  [place] [address]  Phone: [phone] Fax: [phone]      Initial Assessment (most recent)       Adult Discharge Assessment - 12/31/24 1143          Discharge Assessment    Assessment Type Discharge Planning Assessment     Confirmed/corrected address, phone number and insurance Yes     Confirmed Demographics Correct on Facesheet     Source of Information patient     Does patient/caregiver understand observation status Yes     Communicated MARILEE with patient/caregiver Yes     Reason For Admission Hyponatremia     People in Home spouse     Do you expect to return to your current living situation? Yes     Do you have help at home or someone to help you manage your care at home? Yes     Who are your caregiver(s) and their phone number(s)? Kody Hernandez (spouse) [phone]     Prior to hospitilization cognitive status: Alert/Oriented;No Deficits     Current cognitive status: Alert/Oriented;No Deficits     Walking or Climbing Stairs Difficulty no     Dressing/Bathing Difficulty no     Home Accessibility not wheelchair accessible     Home Layout Able to live on 1st floor     Equipment Currently Used at Home none      Readmission within 30 days? No     Patient currently being followed by outpatient case management? No     Do you currently have service(s) that help you manage your care at home? No     Do you take prescription medications? Yes     Do you have prescription coverage? Yes     Coverage [employer]     Do you have any problems affording any of your prescribed medications? No     Is the patient taking medications as prescribed? yes     Who is going to help you get home at discharge? spouse     How do you get to doctors appointments? family or friend will provide     Are you on dialysis? No     Do you take coumadin? No     Discharge Plan A Home with family     Discharge Plan B Home;Home Health     DME Needed Upon Discharge  none     Discharge Plan discussed with: Patient     Transition of Care Barriers None        Financial Resource Strain    How hard is it for you to pay for the very basics like food, housing, medical care, and heating? Not hard at all        Housing Stability    In the last 12 months, was there a time when you were not able to pay the mortgage or rent on time? No     At any time in the past 12 months, were you homeless or living in a shelter (including now)? No        Transportation Needs    Has the lack of transportation kept you from medical appointments, meetings, work or from getting things needed for daily living? No        Food Insecurity    Within the past 12 months, you worried that your food would run out before you got the money to buy more. Never true     Within the past 12 months, the food you bought just didn't last and you didn't have money to get more. Never true        Stress    Do you feel stress - tense, restless, nervous, or anxious, or unable to sleep at night because your mind is troubled all the time - these days? Not at all        Social Isolation    How often do you feel lonely or isolated from those around you?  Never        Alcohol Use    Q1: How often do you have a drink containing  alcohol? Never     Q2: How many drinks containing alcohol do you have on a typical day when you are drinking? Patient does not drink        Utilities    In the past 12 months has the electric, gas, oil, or water company threatened to shut off services in your home? No        Health Literacy    How often do you need to have someone help you when you read instructions, pamphlets, or other written material from your doctor or pharmacy? Never        Transportation Needs    In the past 12 months, has lack of transportation kept you from medical appointments or from getting medications? No     In the past 12 months, has lack of transportation kept you from meetings, work, or from getting things needed for daily living? No        Social Connections    In a typical week, how many times do you talk on the phone with family, friends, or neighbors? Three times a week     How often do you get together with friends or relatives? Twice a week     Do you belong to any clubs or organizations such as Mandaeism groups, unions, fraternal or athletic groups, or school groups? No     Are you , , , , never , or living with a partner?         OTHER    Name(s) of People in Home Kody Hernandez CM spoke with patient and spouse in Room 65630 at bedside. All information was verified on facesheet. Patient lives in a 1 story house with spouse and daughter, 0 steps to get inside with pets. Patient states no assistance is needed. Patient can ambulate, and complete ADL's independently. Patient no longer drives but spouse and daughter takes her where she needs to go. Patient does not use any DME or any in-home assistive equipment. Patient has not used Home Health previously. Patient is not on dialysis nor use Coumadin as a blood thinner. Patient takes medication as prescribed and has resources for all prescriptive needs. Patient will have help from family member upon discharge. Family will  provide transportation home. All Questions and concerns addressed and whiteboard updated with CM contact information. Will continue to follow for course of hospitalization.      Discharge Plan A and Plan B have been determined by review of patient's clinical status, future medical and therapeutic needs, and coverage/benefits for post-acute care in coordination with multidisciplinary team members.     Nini Mackenzie RN, BSN  Case Management  [phone]

## 2024-12-31 NOTE — ASSESSMENT & PLAN NOTE
JACQUE on CKD 3a  Stable  SCr 1.6 @ time of consultation  Likely related to her ongoing urinary retention with underlying volume depletion    Recommend placement of mei catheter  Recommend starting albumin 25g TID x 48 hours  Low suspicious for HRS as MAPS have been ranged between

## 2024-12-31 NOTE — SUBJECTIVE & OBJECTIVE
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    Arnold-Chiari malformation     Bacteremia 12/22/2023    Depression     Esophageal stricture     Essential tremor     Hypertension     Left bundle branch block     Liver fibrosis, transplanted liver 10/02/2018    Suggested on fibroscan 10/2/18    Migraine without aura     MVP (mitral valve prolapse)     Non-rheumatic mitral regurgitation 10/01/2018    Non-rheumatic tricuspid valve insufficiency 10/01/2018    Osteoporosis     Palliative care encounter 8/19/2024    Perforated abdominal viscus 09/04/2020    Recurrent urinary tract infection     Seizures     Shingles 2007    SIADH (syndrome of inappropriate ADH production)     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    Tricuspid valve prolapse     Urolithiasis     Von Gierke disease     s/p liver transplant       Past Surgical History:   Procedure Laterality Date    APPENDECTOMY  6/22/2007    APPLICATION OF WOUND VACUUM-ASSISTED CLOSURE DEVICE N/A 9/18/2020    Procedure: APPLICATION, WOUND VAC;  Surgeon: Zain Decker MD;  Location: [facility] OR [address] [place] [address];  Service: General;  Laterality: N/A;    COLONOSCOPY  5/13/2008    internal hemorrhoids    CRANIOTOMY      ESOPHAGOGASTRODUODENOSCOPY N/A 9/3/2020    Procedure: EGD (ESOPHAGOGASTRODUODENOSCOPY);  Surgeon: Tyrel Vergara MD;  Location: [facility];  Service: Endoscopy;  Laterality: N/A;    ESOPHAGOGASTRODUODENOSCOPY N/A 12/22/2023    Procedure: EGD (ESOPHAGOGASTRODUODENOSCOPY);  Surgeon: Jhony James MD;  Location: [address]);  Service: Endoscopy;  Laterality: N/A;    EXPLORATORY LAPAROTOMY  2020    due to perforated stomach    LAMINECTOMY  3/2001    LIVER TRANSPLANT  9/23/2002    OSSICULAR RECONSTRUCTION  10/4/1995    RIGHT REPLACEMENT PROSTHESIS for cholesteatoma    THYMECTOMY  5/2/2007    TONSILLECTOMY, ADENOIDECTOMY  1/21/2004    TOTAL ABDOMINAL HYSTERECTOMY  3/31/1994       Review of patient's allergies indicates:    Allergen Reactions    Codeine Itching     Other reaction(s): Itching    Lipitor [atorvastatin] Other (See Comments)     Other reaction(s): Muscle pain  Muscle cranmps    Morphine Itching     Other reaction(s): nausea and vomiting     Zoloft [sertraline] Other (See Comments)     Tremors/muscle spasms     Current Facility-Administered Medications   Medication Frequency    acetaminophen tablet 650 mg Q8H PRN    albumin human 25% bottle 25 g TID    cefTRIAXone injection 1 g Daily    dextrose 50% injection 12.5 g PRN    dextrose 50% injection 25 g PRN    glucagon (human recombinant) injection 1 mg PRN    glucose chewable tablet 16 g PRN    glucose chewable tablet 24 g PRN    heparin (porcine) injection 5,000 Units Q8H    hydrocortisone 2.5 % cream BID PRN    lactulose 20 gram/30 mL solution Soln 10 g TID    levothyroxine tablet 75 mcg Daily    melatonin tablet 6 mg Nightly PRN    naloxone 0.4 mg/mL injection 0.02 mg PRN    ondansetron injection 4 mg Q6H PRN    sodium chloride 0.9% flush 10 mL Q12H PRN    tacrolimus capsule 0.5 mg BID    venlafaxine 24 hr capsule 150 mg Daily     Family History       Problem Relation (Age of Onset)    Heart disease Mother    No Known Problems Father    Stroke Mother          Tobacco Use    Smoking status: Never    Smokeless tobacco: Never   Substance and Sexual Activity    Alcohol use: No    Drug use: No    Sexual activity: Not on file     Review of Systems   Constitutional:  Negative for fever.   HENT:  Negative for congestion.    Eyes: Negative.    Respiratory:  Negative for shortness of breath.    Cardiovascular:  Negative for leg swelling.   Gastrointestinal:  Positive for abdominal distention and abdominal pain. Negative for vomiting.   Endocrine: Negative.    Genitourinary: Negative.    Musculoskeletal: Negative.    Skin: Negative.    Allergic/Immunologic: Negative.    Neurological: Negative.    Hematological: Negative.      Objective:     Vital Signs (Most Recent):  Temp: 97.4 °F  (36.3 °C) (12/31/24 1112)  Pulse: 84 (12/31/24 1112)  Resp: 16 (12/31/24 1159)  BP: (!) 121/56 (12/31/24 1112)  SpO2: 98 % (12/31/24 1112) Vital Signs (24h Range):  Temp:  [97.4 °F (36.3 °C)-98.5 °F (36.9 °C)] 97.4 °F (36.3 °C)  Pulse:  [71-84] 84  Resp:  [14-20] 16  SpO2:  [95 %-99 %] 98 %  BP: (121-158)/(56-84) 121/56     Weight: 56.8 kg (125 lb 1.8 oz) (12/30/24 0528)  Body mass index is 25.27 kg/m².  Body surface area is 1.54 meters squared.    I/O last 3 completed shifts:  In: 2531 [P.O.:1831; I.V.:200; IV Piggyback:500]  Out: 750 [Urine:750]     Physical Exam  Vitals and nursing note reviewed.   Constitutional:       General: She is not in acute distress.     Appearance: She is ill-appearing (Chronically ill-looking).   HENT:      Head: Normocephalic and atraumatic.      Mouth/Throat:      Mouth: Mucous membranes are moist.   Cardiovascular:      Rate and Rhythm: Normal rate and regular rhythm.   Pulmonary:      Effort: Pulmonary effort is normal. No respiratory distress.   Abdominal:      General: There is distension.      Tenderness: There is abdominal tenderness (mild).   Musculoskeletal:      Right lower leg: No edema.      Left lower leg: No edema.   Skin:     General: Skin is warm and dry.      Coloration: Skin is jaundiced (mild).   Neurological:      General: No focal deficit present.      Mental Status: She is alert and oriented to person, place, and time.   Psychiatric:         Mood and Affect: Mood normal.         Behavior: Behavior normal.          Significant Labs:  CBC:   Recent Labs   Lab 12/31/24  0609   WBC 3.94   RBC 2.72*   HGB 8.2*   HCT 23.8*   PLT 76*   MCV 88   MCH 30.1   MCHC 34.5     CMP:   Recent Labs   Lab 12/31/24  0609 12/31/24  1124   GLU 80  79 92   CALCIUM 8.6*  8.7 8.9   ALBUMIN 2.8*  --    PROT 5.9*  --    *  121* 125*   K 5.5*  5.5* 5.6*   CO2 21*  21* 23   CL 95  94* 96   BUN 28*  26* 28*   CREATININE 1.6*  1.6* 1.6*   ALKPHOS 321*  --    ALT 31  --    AST 55*   --    BILITOT 5.2*  --

## 2024-12-31 NOTE — ASSESSMENT & PLAN NOTE
Acute on chronic  Meghna 13, SOSM 262  UOSM 208.    On home lasix; similar problem has happened back in August of this year.    Suspect hypovolemic hyponatremia but could also be related to her underlying liver disease.    Recommend holding lasix  Recommend starting albumin 25 g TID x 48 hours

## 2024-12-31 NOTE — CONSULTS
[facility] - Transplant Stepdown  Nephrology  Consult Note    Patient Name: Elda Hernandez  MRN: [id number]  Admission Date: 12/29/2024  Hospital Length of Stay: 2 days  Attending Provider: Jakob Shelton, *   Primary Care Physician: David Lorenzo MD  Principal Problem:Hyponatremia    Inpatient consult to Nephrology  Consult performed by: Samuel Luaren NP  Consult ordered by: Janet Casillas DO  Reason for consult: JACQUE/Hyponatremia        Subjective:     HPI: Elda Hernandez is a 53 yo female s/p liver transplant in 2002 (von Gierke disease) on tacro, cirrhosis of transplanted liver, portal hypertension, chronic hyponatremia, HTN, CKD 3a with multiple JACQUE's in the past who presents to the hospital on 12/29 for evaluation of hyponatremia found on labs as an OP.  She has has this problem in the past which has improved with holding diuretics and administering NS + albumin.  Home diuretics consist of Lasix and she has been holding spironolactone since her previous discharge from the hospital.  On this admission, Na 118, SOSM 262, Meghna 13.  She was given 500 ml NS bolus and started on albumin with improvement in sodium, up to 125 @ time of consultation.  Since admission, she has had urinary retention requiring intermittent catheterizations.  Attempts at paracentesis have been unsuccessful.  SCr increased to 1.6 on 12/31 and Nephrology was consulted over concerns for HRS and possible need for terlipressin.    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    Arnold-Chiari malformation     Bacteremia 12/22/2023    Depression     Esophageal stricture     Essential tremor     Hypertension     Left bundle branch block     Liver fibrosis, transplanted liver 10/02/2018    Suggested on fibroscan 10/2/18    Migraine without aura     MVP (mitral valve prolapse)     Non-rheumatic mitral regurgitation 10/01/2018    Non-rheumatic tricuspid valve insufficiency 10/01/2018    Osteoporosis      Palliative care encounter 8/19/2024    Perforated abdominal viscus 09/04/2020    Recurrent urinary tract infection     Seizures     Shingles 2007    SIADH (syndrome of inappropriate ADH production)     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    Tricuspid valve prolapse     Urolithiasis     Von Gierke disease     s/p liver transplant       Past Surgical History:   Procedure Laterality Date    APPENDECTOMY  6/22/2007    APPLICATION OF WOUND VACUUM-ASSISTED CLOSURE DEVICE N/A 9/18/2020    Procedure: APPLICATION, WOUND VAC;  Surgeon: Zain Decker MD;  [facility];  Service: General;  Laterality: N/A;    COLONOSCOPY  5/13/2008    internal hemorrhoids    CRANIOTOMY      ESOPHAGOGASTRODUODENOSCOPY N/A 9/3/2020    Procedure: EGD (ESOPHAGOGASTRODUODENOSCOPY);  Surgeon: Tyrel Vergara MD;  Location: [facility];  Service: Endoscopy;  Laterality: N/A;    ESOPHAGOGASTRODUODENOSCOPY N/A 12/22/2023    Procedure: EGD (ESOPHAGOGASTRODUODENOSCOPY);  Surgeon: Jhony James MD;  Location: [facility] ([address] [place] [address]);  Service: Endoscopy;  Laterality: N/A;    EXPLORATORY LAPAROTOMY  2020    due to perforated stomach    LAMINECTOMY  3/2001    LIVER TRANSPLANT  9/23/2002    OSSICULAR RECONSTRUCTION  10/4/1995    RIGHT REPLACEMENT PROSTHESIS for cholesteatoma    THYMECTOMY  5/2/2007    TONSILLECTOMY, ADENOIDECTOMY  1/21/2004    TOTAL ABDOMINAL HYSTERECTOMY  3/31/1994       Review of patient's allergies indicates:   Allergen Reactions    Codeine Itching     Other reaction(s): Itching    Lipitor [atorvastatin] Other (See Comments)     Other reaction(s): Muscle pain  Muscle cranmps    Morphine Itching     Other reaction(s): nausea and vomiting     Zoloft [sertraline] Other (See Comments)     Tremors/muscle spasms     Current Facility-Administered Medications   Medication Frequency    acetaminophen tablet 650 mg Q8H PRN    albumin human 25% bottle 25 g TID    cefTRIAXone injection 1 g Daily    dextrose 50% injection 12.5  g PRN    dextrose 50% injection 25 g PRN    glucagon (human recombinant) injection 1 mg PRN    glucose chewable tablet 16 g PRN    glucose chewable tablet 24 g PRN    heparin (porcine) injection 5,000 Units Q8H    hydrocortisone 2.5 % cream BID PRN    lactulose 20 gram/30 mL solution Soln 10 g TID    levothyroxine tablet 75 mcg Daily    melatonin tablet 6 mg Nightly PRN    naloxone 0.4 mg/mL injection 0.02 mg PRN    ondansetron injection 4 mg Q6H PRN    sodium chloride 0.9% flush 10 mL Q12H PRN    tacrolimus capsule 0.5 mg BID    venlafaxine 24 hr capsule 150 mg Daily     Family History       Problem Relation (Age of Onset)    Heart disease Mother    No Known Problems Father    Stroke Mother          Tobacco Use    Smoking status: Never    Smokeless tobacco: Never   Substance and Sexual Activity    Alcohol use: No    Drug use: No    Sexual activity: Not on file     Review of Systems   Constitutional:  Negative for fever.   HENT:  Negative for congestion.    Eyes: Negative.    Respiratory:  Negative for shortness of breath.    Cardiovascular:  Negative for leg swelling.   Gastrointestinal:  Positive for abdominal distention and abdominal pain. Negative for vomiting.   Endocrine: Negative.    Genitourinary: Negative.    Musculoskeletal: Negative.    Skin: Negative.    Allergic/Immunologic: Negative.    Neurological: Negative.    Hematological: Negative.      Objective:     Vital Signs (Most Recent):  Temp: 97.4 °F (36.3 °C) (12/31/24 1112)  Pulse: 84 (12/31/24 1112)  Resp: 16 (12/31/24 1159)  BP: (!) 121/56 (12/31/24 1112)  SpO2: 98 % (12/31/24 1112) Vital Signs (24h Range):  Temp:  [97.4 °F (36.3 °C)-98.5 °F (36.9 °C)] 97.4 °F (36.3 °C)  Pulse:  [71-84] 84  Resp:  [14-20] 16  SpO2:  [95 %-99 %] 98 %  BP: (121-158)/(56-84) 121/56     Weight: 56.8 kg (125 lb 1.8 oz) (12/30/24 0528)  Body mass index is 25.27 kg/m².  Body surface area is 1.54 meters squared.    I/O last 3 completed shifts:  In: 8546 [P.O.:1831; I.V.:200;  IV Piggyback:500]  Out: 750 [Urine:750]     Physical Exam  Vitals and nursing note reviewed.   Constitutional:       General: She is not in acute distress.     Appearance: She is ill-appearing (Chronically ill-looking).   HENT:      Head: Normocephalic and atraumatic.      Mouth/Throat:      Mouth: Mucous membranes are moist.   Cardiovascular:      Rate and Rhythm: Normal rate and regular rhythm.   Pulmonary:      Effort: Pulmonary effort is normal. No respiratory distress.   Abdominal:      General: There is distension.      Tenderness: There is abdominal tenderness (mild).   Musculoskeletal:      Right lower leg: No edema.      Left lower leg: No edema.   Skin:     General: Skin is warm and dry.      Coloration: Skin is jaundiced (mild).   Neurological:      General: No focal deficit present.      Mental Status: She is alert and oriented to person, place, and time.   Psychiatric:         Mood and Affect: Mood normal.         Behavior: Behavior normal.          Significant Labs:  CBC:   Recent Labs   Lab 12/31/24  0609   WBC 3.94   RBC 2.72*   HGB 8.2*   HCT 23.8*   PLT 76*   MCV 88   MCH 30.1   MCHC 34.5     CMP:   Recent Labs   Lab 12/31/24  0609 12/31/24  1124   GLU 80  79 92   CALCIUM 8.6*  8.7 8.9   ALBUMIN 2.8*  --    PROT 5.9*  --    *  121* 125*   K 5.5*  5.5* 5.6*   CO2 21*  21* 23   CL 95  94* 96   BUN 28*  26* 28*   CREATININE 1.6*  1.6* 1.6*   ALKPHOS 321*  --    ALT 31  --    AST 55*  --    BILITOT 5.2*  --      Assessment/Plan:     Renal/  JACQUE (acute kidney injury)  JACQUE on CKD 3a  Stable  SCr 1.6 @ time of consultation  Likely related to her ongoing urinary retention with underlying volume depletion    Recommend placement of mei catheter  Recommend starting albumin 25g TID x 48 hours  Low suspicious for HRS as MAPS have been ranged between     Endocrine  * Hyponatremia  Acute on chronic  Meghna 13, SOSM 262  UOSM 208.    On home lasix; similar problem has happened back in August  of this year.    Suspect hypovolemic hyponatremia but could also be related to her underlying liver disease.    Recommend holding lasix  Recommend starting albumin 25 g TID x 48 hours      GI  S/P liver transplant 9/23/2002 for von Gierke disease  Followed by Hepatology      Hyperkalemia  -2/2 urinary retention +/- Tacro  -please place mei catheter  -albumin for volume resuscitation   -if remains elevated, start Lokelma; place on low K diet    Samuel Gill, NP  Nephrology  [facility] - Transplant Stepdown

## 2024-12-31 NOTE — SUBJECTIVE & OBJECTIVE
Interval History: Pt complaining of abdominal pain and distension. Pt remains hyponatremic.     Review of Systems   Constitutional:  Negative for fever.   HENT:  Negative for congestion.    Eyes: Negative.    Respiratory:  Negative for shortness of breath.    Cardiovascular:  Negative for leg swelling.   Gastrointestinal:  Positive for abdominal distention and abdominal pain. Negative for vomiting.   Endocrine: Negative.    Genitourinary: Negative.    Musculoskeletal: Negative.    Skin: Negative.    Allergic/Immunologic: Negative.    Neurological: Negative.    Hematological: Negative.    Psychiatric/Behavioral:  Positive for confusion.      Objective:     Vital Signs (Most Recent):  Temp: 98.3 °F (36.8 °C) (12/31/24 0734)  Pulse: 75 (12/31/24 0734)  Resp: 16 (12/31/24 0734)  BP: (!) 147/66 (12/31/24 0734)  SpO2: 99 % (12/31/24 0734) Vital Signs (24h Range):  Temp:  [98.2 °F (36.8 °C)-98.5 °F (36.9 °C)] 98.3 °F (36.8 °C)  Pulse:  [71-84] 75  Resp:  [16-20] 16  SpO2:  [95 %-99 %] 99 %  BP: (126-158)/(58-84) 147/66     Weight: 56.8 kg (125 lb 1.8 oz)  Body mass index is 25.27 kg/m².    Intake/Output Summary (Last 24 hours) at 12/31/2024 0908  Last data filed at 12/31/2024 0725  Gross per 24 hour   Intake 2431 ml   Output 650 ml   Net 1781 ml         Physical Exam  Vitals and nursing note reviewed.   Constitutional:       General: She is not in acute distress.     Appearance: She is ill-appearing (Chronically ill-looking).   HENT:      Head: Normocephalic and atraumatic.      Mouth/Throat:      Mouth: Mucous membranes are moist.   Cardiovascular:      Rate and Rhythm: Normal rate and regular rhythm.   Pulmonary:      Effort: Pulmonary effort is normal. No respiratory distress.   Abdominal:      General: There is distension.      Tenderness: There is abdominal tenderness (mild).   Musculoskeletal:      Right lower leg: No edema.      Left lower leg: No edema.   Skin:     General: Skin is warm and dry.      Coloration:  Skin is jaundiced (mild).   Neurological:      General: No focal deficit present.      Mental Status: She is alert and oriented to person, place, and time.   Psychiatric:         Mood and Affect: Mood normal.         Behavior: Behavior normal.             Significant Labs: All pertinent labs within the past 24 hours have been reviewed.    Significant Imaging: I have reviewed all pertinent imaging results/findings within the past 24 hours.

## 2024-12-31 NOTE — PLAN OF CARE
Na+=121 this am and increased to 125 today. K+=5.5. Lokelma 5grams po given. Repeat K=5.6. Cr=1.6. BMP monitoring Q 6hrs continues. AST and ALT dec today. Pt drinking Boost and smoothies for nutrition. States does not eat food much due to stricture. Afebrile. Rocephin daily started today. RUQ abd pain decreased with Oxycodone.  C/O nausea. Relieved with Zofran. Pt stated she is able to void now without difficulty. Stated preferred not to have mei cath placed. Agreed to void in pan for accurate measurement of urine. Emilie Gaspar and Carin notified. MARGARITA Dan also notified. Orders for Mei discontinued. Ambulates independently to  and in room wearing non-skid socks/slippers.  visited today.

## 2024-12-31 NOTE — ASSESSMENT & PLAN NOTE
JACQUE is likely due to  hypovolemia vs hepatorenal syndrome . Urine output remains low and patient has required 2 straight catheterizations during this admission. Baseline creatinine is  1.0-1.1 . Most recent creatinine and eGFR are listed below.  Recent Labs     12/30/24  2131 12/30/24  2241 12/31/24  0609   CREATININE 1.7* 1.6* 1.6*  1.6*   EGFRNORACEVR 35.0* 37.6* 37.6*  37.6*      Plan  - JACQUE is worsening.  - Nephrology consulted for recommendations and evaluation for initiation of terlipressin  - Albumin 50g BID  - Avoid nephrotoxins and renally dose meds for GFR listed above  - Monitor urine output, serial BMP, and adjust therapy as needed

## 2024-12-31 NOTE — ASSESSMENT & PLAN NOTE
Patient with known history of cirrhosis.    MELD-Na score calculated; MELD 3.0: 28 at 12/31/2024  6:09 AM  MELD-Na: 28 at 12/31/2024  6:09 AM  Calculated from:  Serum Creatinine: 1.6 mg/dL at 12/31/2024  6:09 AM  Serum Sodium: 121 mmol/L (Using min of 125 mmol/L) at 12/31/2024  6:09 AM  Total Bilirubin: 5.2 mg/dL at 12/31/2024  6:09 AM  Serum Albumin: 2.8 g/dL at 12/31/2024  6:09 AM  INR(ratio): 1.3 at 12/29/2024  9:47 AM  Age at listing (hypothetical): 54 years  Sex: Female at 12/31/2024  6:09 AM      Plan:  -- Continue lactulose TID; titrate to 3-4 bowel movements per day.   -- Avoid any hepatotoxic meds  -- Monitor CBC/CMP/INR for synthetic function.  -- IM6 team consulted for possible diagnostic paracentesis; bedside US showed no available fluid pocket to drain; abdominal distension likely associated with gas. Suspicion for SBP at this time is low but CTX/albumin initiated in the setting of abdominal distention and pain.   -- Hepatology consulted - patient is not a candidate for retransplantation at this time since she is high risk given history of multiple surgeries after perforated gastric ulcer. Patient plans to seek a second opinion at [facility], [facility] or [place].

## 2024-12-31 NOTE — ASSESSMENT & PLAN NOTE
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     The patient's most recent sodium results are listed below.  Recent Labs     12/30/24  2131 12/30/24  2241 12/31/24  0609   * 123* 121*  121*       Plan  - Correct the sodium by 4-6mEq in 24 hours.   - Obtain the following studies: Urine sodium, urine osmolality, serum osmolality.  - Will treat the hyponatremia with IV fluids as needed and Fluid restriction of:  1.5 liter per day.  - 500cc one time bolus of NS given.  - Monitor sodium Every 12 hours.

## 2024-12-31 NOTE — HPI
Elda Hernandez is a 53 yo female s/p liver transplant in 2002 (von Gierke disease) on tacro, cirrhosis of transplanted liver, portal hypertension, chronic hyponatremia, HTN, CKD 3a with multiple JACQUE's in the past who presents to the hospital on 12/29 for evaluation of hyponatremia found on labs as an OP.  She has has this problem in the past which has improved with holding diuretics and administering NS + albumin.  Home diuretics consist of Lasix and she has been holding spironolactone since her previous discharge from the hospital.  On this admission, Na 118, SOSM 262, Meghna 13.  She was given 500 ml NS bolus and started on albumin with improvement in sodium, up to 125 @ time of consultation.  Since admission, she has had urinary retention requiring intermittent catheterizations.  Attempts at paracentesis have been unsuccessful.  SCr increased to 1.6 on 12/31 and Nephrology was consulted over concerns for HRS and possible need for terlipressin.

## 2024-12-31 NOTE — ASSESSMENT & PLAN NOTE
Pt has had complaints of abdominal pain throughout hospital stay -- she received 2 doses of dilaudid and is now receiving spot doses of oxycodone 5mg prn. Although suspicion for SBP is initially low, patient remains distended and with mild tenderness in the abdomen. Will treat for SBP.    Plan:  -- Pain medication as appropriate  -- CTX 1g QD for 5 days  -- Albumin 50g BID

## 2024-12-31 NOTE — PROGRESS NOTES
[facility] - Transplant [facility] Medicine  Progress Note    Patient Name: Elda Hernandez  MRN: [id number]  Patient Class: IP- Inpatient   Admission Date: 12/29/2024  Length of Stay: 2 days  Attending Physician: Jakob Shelton, *  Primary Care Provider: David Lorenzo MD        Subjective     Principal Problem:Hyponatremia        HPI:  Ms. 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 She presents to [facility] ED today with her spouse d/o hyponatremia, she gets routine lab, the recent lab shows sodium of 118 and she was told by her provider to come to the ED. Her chronic medications include Lasix, spironolactone, tacrolimus, levothyroxine, venlafaxine, ramipril, and pantoprazole. The patient reported that she has not been taking her antidiuretic and antihypertensive medications recently, except for Lasix, due to concerns about hyponatremia. She appears slightly confused, though this seems consistent with her baseline. She denies chest pain, fever, or chills.    In the ED, Afebrile, SBP slightly elevated (160), otherwise vitals were stable. Labs were remarkable for Na- 121, K-5.1, Tbil- 5.3, Alb- 2.4, ALP- 438, AST- 97, ALT- 49, PT- 14.4. Urine Osm pending.    Admit to hospital medicine for further work-up and management of hyponatremia.      Overview/Hospital Course:  Pt admitted to hospital medicine for further workup and management of hyponatremia likely associated with hypovolemia vs cirrhosis vs medication noncompliance vs SSRI use. IM6 consulted for diagnostic paracentesis but  no fluid pocket was noted on US. 500 cc NS bolus given and fluid restriction liberalized from 800 mL daily to 1.5L daily. Hepatology consulted for recommendations on management of tacrolimus who recommends continuing patient's home dose 0.5mg BID. Pt required 2 straight catheterizations since admission.     Interval History: Pt complaining of abdominal pain and distension. Pt remains hyponatremic.     Review of Systems   Constitutional:  Negative for fever.   HENT:  Negative for congestion.    Eyes: Negative.    Respiratory:  Negative for shortness of breath.    Cardiovascular:  Negative for leg swelling.   Gastrointestinal:  Positive for abdominal distention and abdominal pain. Negative for vomiting.   Endocrine: Negative.    Genitourinary: Negative.    Musculoskeletal: Negative.    Skin: Negative.    Allergic/Immunologic: Negative.    Neurological: Negative.    Hematological: Negative.    Psychiatric/Behavioral:  Positive for confusion.      Objective:     Vital Signs (Most Recent):  Temp: 98.3 °F (36.8 °C) (12/31/24 0734)  Pulse: 75 (12/31/24 0734)  Resp: 16 (12/31/24 0734)  BP: (!) 147/66 (12/31/24 0734)  SpO2: 99 % (12/31/24 0734) Vital Signs (24h Range):  Temp:  [98.2 °F (36.8 °C)-98.5 °F (36.9 °C)] 98.3 °F (36.8 °C)  Pulse:  [71-84] 75  Resp:  [16-20] 16  SpO2:  [95 %-99 %] 99 %  BP: (126-158)/(58-84) 147/66     Weight: 56.8 kg (125 lb 1.8 oz)  Body mass index is 25.27 kg/m².    Intake/Output Summary (Last 24 hours) at 12/31/2024 0908  Last data filed at 12/31/2024 0725  Gross per 24 hour   Intake 2431 ml   Output 650 ml   Net 1781 ml         Physical Exam  Vitals and nursing note reviewed.   Constitutional:       General: She is not in acute distress.     Appearance: She is ill-appearing (Chronically ill-looking).   HENT:      Head: Normocephalic and atraumatic.      Mouth/Throat:      Mouth: Mucous membranes are moist.   Cardiovascular:      Rate and Rhythm: Normal rate and regular rhythm.   Pulmonary:       Effort: Pulmonary effort is normal. No respiratory distress.   Abdominal:      General: There is distension.      Tenderness: There is abdominal tenderness (mild).   Musculoskeletal:      Right lower leg: No edema.      Left lower leg: No edema.   Skin:     General: Skin is warm and dry.      Coloration: Skin is jaundiced (mild).   Neurological:      General: No focal deficit present.      Mental Status: She is alert and oriented to person, place, and time.   Psychiatric:         Mood and Affect: Mood normal.         Behavior: Behavior normal.             Significant Labs: All pertinent labs within the past 24 hours have been reviewed.    Significant Imaging: I have reviewed all pertinent imaging results/findings within the past 24 hours.    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     The patient's most recent sodium results are listed below.  Recent Labs     12/30/24  2131 12/30/24  2241 12/31/24  0609   * 123* 121*  121*       Plan  - Correct the sodium by 4-6mEq in 24 hours.   - Obtain the following studies: Urine sodium, urine osmolality, serum osmolality.  - Will treat the hyponatremia with IV fluids as needed and Fluid restriction of:  1.5 liter per day.  - 500cc one time bolus of NS given.  - Monitor sodium Every 12 hours.         JACQUE (acute kidney injury)  JACQUE is likely due to  hypovolemia vs hepatorenal syndrome . Urine output remains low and patient has required 2 straight catheterizations during this admission. Baseline creatinine is   1.0-1.1 . Most recent creatinine and eGFR are listed below.  Recent Labs     12/30/24  2131 12/30/24  2241 12/31/24  0609   CREATININE 1.7* 1.6* 1.6*  1.6*   EGFRNORACEVR 35.0* 37.6* 37.6*  37.6*      Plan  - JACQUE is worsening.  - Nephrology consulted for recommendations and evaluation for initiation of terlipressin  - Albumin 50g BID  - Avoid nephrotoxins and renally dose meds for GFR listed above  - Monitor urine output, serial BMP, and adjust therapy as needed    Cirrhosis of liver with ascites  Patient with known history of cirrhosis.    MELD-Na score calculated; MELD 3.0: 28 at 12/31/2024  6:09 AM  MELD-Na: 28 at 12/31/2024  6:09 AM  Calculated from:  Serum Creatinine: 1.6 mg/dL at 12/31/2024  6:09 AM  Serum Sodium: 121 mmol/L (Using min of 125 mmol/L) at 12/31/2024  6:09 AM  Total Bilirubin: 5.2 mg/dL at 12/31/2024  6:09 AM  Serum Albumin: 2.8 g/dL at 12/31/2024  6:09 AM  INR(ratio): 1.3 at 12/29/2024  9:47 AM  Age at listing (hypothetical): 54 years  Sex: Female at 12/31/2024  6:09 AM      Plan:  -- Continue lactulose TID; titrate to 3-4 bowel movements per day.   -- Avoid any hepatotoxic meds  -- Monitor CBC/CMP/INR for synthetic function.  -- IM6 team consulted for possible diagnostic paracentesis; bedside US showed no available fluid pocket to drain; abdominal distension likely associated with gas. Suspicion for SBP at this time is low but CTX/albumin initiated in the setting of abdominal distention and pain.   -- Hepatology consulted - patient is not a candidate for retransplantation at this time since she is high risk given history of multiple surgeries after perforated gastric ulcer. Patient plans to seek a second opinion at [facility], [facility] or [place].     Headache  -- PRN tylenol   -- Will consider migraine cocktail if needed      RUQ abdominal pain  Pt has had complaints of abdominal pain throughout hospital stay -- she received 2 doses of dilaudid and is now receiving spot doses of  oxycodone 5mg prn. Although suspicion for SBP is initially low, patient remains distended and with mild tenderness in the abdomen. Will treat for SBP.    Plan:  -- Pain medication as appropriate  -- CTX 1g QD for 5 days  -- Albumin 50g BID    Acquired hypothyroidism  -- Continue home Levothyroxine    S/P liver transplant 9/23/2002 for von Gierke disease  known liver transplant in 9/2002.     Plan:  -- Hepatology consulted to assist in immunosuppressive medicine management; recommending continuing home tacrolimus 0.5mg BID  -- Daily tacrolimus level and CMP        VTE Risk Mitigation (From admission, onward)           Ordered     heparin (porcine) injection 5,000 Units  Every 8 hours         12/29/24 1331     Place sequential compression device  Until discontinued         12/29/24 1331     IP VTE LOW RISK PATIENT  Once         12/29/24 1331     Place sequential compression device  Until discontinued         12/29/24 1254                    Discharge Planning   MARILEE: 1/1/2025     Code Status: Full Code   Medical Readiness for Discharge Date:   Discharge Plan A: Home with family          Janet Casillas DO  Department of Hospital Medicine   [facility] - Transplant Stepdown

## 2025-01-01 LAB
ALBUMIN SERPL BCP-MCNC: 3.6 G/DL (ref 3.5–5.2)
ALP SERPL-CCNC: 272 U/L (ref 40–150)
ALT SERPL W/O P-5'-P-CCNC: 27 U/L (ref 10–44)
ANION GAP SERPL CALC-SCNC: 10 MMOL/L (ref 8–16)
ANION GAP SERPL CALC-SCNC: 11 MMOL/L (ref 8–16)
ANION GAP SERPL CALC-SCNC: 11 MMOL/L (ref 8–16)
ANION GAP SERPL CALC-SCNC: 8 MMOL/L (ref 8–16)
AST SERPL-CCNC: 47 U/L (ref 10–40)
BASOPHILS # BLD AUTO: 0.02 K/UL (ref 0–0.2)
BASOPHILS NFR BLD: 0.5 % (ref 0–1.9)
BILIRUB SERPL-MCNC: 5.1 MG/DL (ref 0.1–1)
BUN SERPL-MCNC: 30 MG/DL (ref 6–20)
BUN SERPL-MCNC: 33 MG/DL (ref 6–20)
BUN SERPL-MCNC: 34 MG/DL (ref 6–20)
BUN SERPL-MCNC: 37 MG/DL (ref 6–20)
CALCIUM SERPL-MCNC: 9.2 MG/DL (ref 8.7–10.5)
CALCIUM SERPL-MCNC: 9.3 MG/DL (ref 8.7–10.5)
CALCIUM SERPL-MCNC: 9.4 MG/DL (ref 8.7–10.5)
CALCIUM SERPL-MCNC: 9.6 MG/DL (ref 8.7–10.5)
CHLORIDE SERPL-SCNC: 95 MMOL/L (ref 95–110)
CHLORIDE SERPL-SCNC: 96 MMOL/L (ref 95–110)
CO2 SERPL-SCNC: 18 MMOL/L (ref 23–29)
CREAT SERPL-MCNC: 1.4 MG/DL (ref 0.5–1.4)
CREAT SERPL-MCNC: 1.4 MG/DL (ref 0.5–1.4)
CREAT SERPL-MCNC: 1.5 MG/DL (ref 0.5–1.4)
CREAT SERPL-MCNC: 1.6 MG/DL (ref 0.5–1.4)
DIFFERENTIAL METHOD BLD: ABNORMAL
EOSINOPHIL # BLD AUTO: 0.2 K/UL (ref 0–0.5)
EOSINOPHIL NFR BLD: 5 % (ref 0–8)
ERYTHROCYTE [DISTWIDTH] IN BLOOD BY AUTOMATED COUNT: 14.6 % (ref 11.5–14.5)
EST. GFR  (NO RACE VARIABLE): 37.6 ML/MIN/1.73 M^2
EST. GFR  (NO RACE VARIABLE): 40.6 ML/MIN/1.73 M^2
EST. GFR  (NO RACE VARIABLE): 44.2 ML/MIN/1.73 M^2
EST. GFR  (NO RACE VARIABLE): 44.2 ML/MIN/1.73 M^2
GLUCOSE SERPL-MCNC: 75 MG/DL (ref 70–110)
GLUCOSE SERPL-MCNC: 80 MG/DL (ref 70–110)
GLUCOSE SERPL-MCNC: 81 MG/DL (ref 70–110)
GLUCOSE SERPL-MCNC: 88 MG/DL (ref 70–110)
HCT VFR BLD AUTO: 24.5 % (ref 37–48.5)
HGB BLD-MCNC: 8.1 G/DL (ref 12–16)
IMM GRANULOCYTES # BLD AUTO: 0.02 K/UL (ref 0–0.04)
IMM GRANULOCYTES NFR BLD AUTO: 0.5 % (ref 0–0.5)
LYMPHOCYTES # BLD AUTO: 0.6 K/UL (ref 1–4.8)
LYMPHOCYTES NFR BLD: 14.8 % (ref 18–48)
MAGNESIUM SERPL-MCNC: 1.9 MG/DL (ref 1.6–2.6)
MCH RBC QN AUTO: 29.3 PG (ref 27–31)
MCHC RBC AUTO-ENTMCNC: 33.1 G/DL (ref 32–36)
MCV RBC AUTO: 89 FL (ref 82–98)
MONOCYTES # BLD AUTO: 0.7 K/UL (ref 0.3–1)
MONOCYTES NFR BLD: 17 % (ref 4–15)
NEUTROPHILS # BLD AUTO: 2.5 K/UL (ref 1.8–7.7)
NEUTROPHILS NFR BLD: 62.2 % (ref 38–73)
NRBC BLD-RTO: 0 /100 WBC
OSMOLALITY UR: 273 MOSM/KG (ref 50–1200)
PHOSPHATE SERPL-MCNC: 2.5 MG/DL (ref 2.7–4.5)
PLATELET # BLD AUTO: 59 K/UL (ref 150–450)
PMV BLD AUTO: 10.9 FL (ref 9.2–12.9)
POTASSIUM SERPL-SCNC: 5.2 MMOL/L (ref 3.5–5.1)
POTASSIUM SERPL-SCNC: 5.2 MMOL/L (ref 3.5–5.1)
POTASSIUM SERPL-SCNC: 5.4 MMOL/L (ref 3.5–5.1)
POTASSIUM SERPL-SCNC: 5.7 MMOL/L (ref 3.5–5.1)
PROT SERPL-MCNC: 6.6 G/DL (ref 6–8.4)
RBC # BLD AUTO: 2.76 M/UL (ref 4–5.4)
SODIUM SERPL-SCNC: 122 MMOL/L (ref 136–145)
SODIUM SERPL-SCNC: 123 MMOL/L (ref 136–145)
SODIUM SERPL-SCNC: 124 MMOL/L (ref 136–145)
SODIUM SERPL-SCNC: 124 MMOL/L (ref 136–145)
SODIUM UR-SCNC: <10 MMOL/L (ref 20–250)
TACROLIMUS BLD-MCNC: 6.2 NG/ML (ref 5–15)
WBC # BLD AUTO: 3.99 K/UL (ref 3.9–12.7)

## 2025-01-01 PROCEDURE — 80053 COMPREHEN METABOLIC PANEL: CPT

## 2025-01-01 PROCEDURE — 25000003 PHARM REV CODE 250

## 2025-01-01 PROCEDURE — 36415 COLL VENOUS BLD VENIPUNCTURE: CPT | Mod: XB

## 2025-01-01 PROCEDURE — 83935 ASSAY OF URINE OSMOLALITY: CPT

## 2025-01-01 PROCEDURE — 80048 BASIC METABOLIC PNL TOTAL CA: CPT | Mod: 91

## 2025-01-01 PROCEDURE — 85025 COMPLETE CBC W/AUTO DIFF WBC: CPT

## 2025-01-01 PROCEDURE — 20600001 HC STEP DOWN PRIVATE ROOM

## 2025-01-01 PROCEDURE — 80197 ASSAY OF TACROLIMUS: CPT

## 2025-01-01 PROCEDURE — 83735 ASSAY OF MAGNESIUM: CPT

## 2025-01-01 PROCEDURE — 63600175 PHARM REV CODE 636 W HCPCS

## 2025-01-01 PROCEDURE — 84100 ASSAY OF PHOSPHORUS: CPT

## 2025-01-01 PROCEDURE — P9047 ALBUMIN (HUMAN), 25%, 50ML: HCPCS

## 2025-01-01 PROCEDURE — 80048 BASIC METABOLIC PNL TOTAL CA: CPT | Mod: 91,XB

## 2025-01-01 PROCEDURE — 84300 ASSAY OF URINE SODIUM: CPT

## 2025-01-01 RX ORDER — BISACODYL 10 MG/1
10 SUPPOSITORY RECTAL DAILY PRN
Status: DISCONTINUED | OUTPATIENT
Start: 2025-01-01 | End: 2025-01-06 | Stop reason: HOSPADM

## 2025-01-01 RX ORDER — MAGNESIUM SULFATE HEPTAHYDRATE 40 MG/ML
2 INJECTION, SOLUTION INTRAVENOUS ONCE
Status: COMPLETED | OUTPATIENT
Start: 2025-01-01 | End: 2025-01-01

## 2025-01-01 RX ORDER — GLYCERIN 1 G/1
1 SUPPOSITORY RECTAL ONCE
Status: DISCONTINUED | OUTPATIENT
Start: 2025-01-01 | End: 2025-01-01

## 2025-01-01 RX ORDER — LACTULOSE 10 G/15ML
20 SOLUTION ORAL ONCE
Status: COMPLETED | OUTPATIENT
Start: 2025-01-01 | End: 2025-01-01

## 2025-01-01 RX ORDER — OXYCODONE HYDROCHLORIDE 5 MG/1
5 TABLET ORAL ONCE
Status: COMPLETED | OUTPATIENT
Start: 2025-01-01 | End: 2025-01-01

## 2025-01-01 RX ADMIN — HEPARIN SODIUM 5000 UNITS: 5000 INJECTION INTRAVENOUS; SUBCUTANEOUS at 01:01

## 2025-01-01 RX ADMIN — LACTULOSE 10 G: 20 SOLUTION ORAL at 01:01

## 2025-01-01 RX ADMIN — ALBUMIN (HUMAN) 25 G: 5 SOLUTION INTRAVENOUS at 01:01

## 2025-01-01 RX ADMIN — MAGNESIUM SULFATE HEPTAHYDRATE 2 G: 40 INJECTION, SOLUTION INTRAVENOUS at 08:01

## 2025-01-01 RX ADMIN — CALCIUM CARBONATE (ANTACID) CHEW TAB 500 MG 500 MG: 500 CHEW TAB at 05:01

## 2025-01-01 RX ADMIN — LACTULOSE 10 G: 20 SOLUTION ORAL at 08:01

## 2025-01-01 RX ADMIN — OXYCODONE 5 MG: 5 TABLET ORAL at 06:01

## 2025-01-01 RX ADMIN — BISACODYL 10 MG: 10 SUPPOSITORY RECTAL at 05:01

## 2025-01-01 RX ADMIN — LACTULOSE 20 G: 20 SOLUTION ORAL at 05:01

## 2025-01-01 RX ADMIN — CEFTRIAXONE 1 G: 1 INJECTION, POWDER, FOR SOLUTION INTRAMUSCULAR; INTRAVENOUS at 08:01

## 2025-01-01 RX ADMIN — LEVOTHYROXINE SODIUM 75 MCG: 75 TABLET ORAL at 05:01

## 2025-01-01 RX ADMIN — SODIUM ZIRCONIUM CYCLOSILICATE 5 G: 5 POWDER, FOR SUSPENSION ORAL at 05:01

## 2025-01-01 RX ADMIN — TACROLIMUS 0.5 MG: 0.5 CAPSULE ORAL at 08:01

## 2025-01-01 RX ADMIN — VENLAFAXINE HYDROCHLORIDE 150 MG: 75 CAPSULE, EXTENDED RELEASE ORAL at 08:01

## 2025-01-01 RX ADMIN — ALBUMIN (HUMAN) 25 G: 5 SOLUTION INTRAVENOUS at 08:01

## 2025-01-01 RX ADMIN — CALCIUM CARBONATE (ANTACID) CHEW TAB 500 MG 500 MG: 500 CHEW TAB at 12:01

## 2025-01-01 RX ADMIN — ALBUMIN (HUMAN) 25 G: 5 SOLUTION INTRAVENOUS at 10:01

## 2025-01-01 RX ADMIN — HEPARIN SODIUM 5000 UNITS: 5000 INJECTION INTRAVENOUS; SUBCUTANEOUS at 05:01

## 2025-01-01 RX ADMIN — TACROLIMUS 0.5 MG: 0.5 CAPSULE ORAL at 05:01

## 2025-01-01 RX ADMIN — HEPARIN SODIUM 5000 UNITS: 5000 INJECTION INTRAVENOUS; SUBCUTANEOUS at 08:01

## 2025-01-01 NOTE — PROGRESS NOTES
[facility] - Transplant [facility] Medicine  Progress Note    Patient Name: Elda Hernandez  MRN: [id number]  Patient Class: IP- Inpatient   Admission Date: 12/29/2024  Length of Stay: 3 days  Attending Physician: Jakob Shelton, *  Primary Care Provider: David Lorenzo MD        Subjective     Principal Problem:Hyponatremia        HPI:  Ms. 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 She presents to [facility] ED today with her spouse d/o hyponatremia, she gets routine lab, the recent lab shows sodium of 118 and she was told by her provider to come to the ED. Her chronic medications include Lasix, spironolactone, tacrolimus, levothyroxine, venlafaxine, ramipril, and pantoprazole. The patient reported that she has not been taking her antidiuretic and antihypertensive medications recently, except for Lasix, due to concerns about hyponatremia. She appears slightly confused, though this seems consistent with her baseline. She denies chest pain, fever, or chills.    In the ED, Afebrile, SBP slightly elevated (160), otherwise vitals were stable. Labs were remarkable for Na- 121, K-5.1, Tbil- 5.3, Alb- 2.4, ALP- 438, AST- 97, ALT- 49, PT- 14.4. Urine Osm pending.    Admit to hospital medicine for further work-up and management of hyponatremia.      Overview/Hospital Course:  Pt admitted to hospital medicine for further workup and management of hyponatremia likely associated with hypovolemia vs cirrhosis vs medication noncompliance vs SSRI use. IM6 consulted for diagnostic paracentesis but  no fluid pocket was noted on US. 500 cc NS bolus given and fluid restriction liberalized from 800 mL daily to 1.5L daily. Nephrology consulted for JACQUE and recommended mei catheter placement which pateint declined; also recommended holding diuretics in the setting of hyponatremia. Hepatology consulted for recommendations on management of tacrolimus who recommends continuing patient's home dose 0.5mg BID. Pt required 2 straight catheterizations since admission but declined mei. Repeat urine studies ordered after hyponatremia did not significantly improve.    Interval History: Pt doing well today and stating that her abdominal pain has improved. Patient expressing significant interest in going to [facility] for a second opinion in regards to liver transplant candidacy.     Review of Systems   Constitutional:  Negative for fever.   HENT:  Negative for congestion.    Eyes: Negative.    Respiratory:  Negative for shortness of breath.    Cardiovascular:  Negative for leg swelling.   Gastrointestinal:  Positive for abdominal distention and abdominal pain. Negative for vomiting.   Endocrine: Negative.    Genitourinary: Negative.    Musculoskeletal: Negative.    Skin: Negative.    Allergic/Immunologic: Negative.    Neurological: Negative.    Hematological: Negative.    Psychiatric/Behavioral:  Positive for confusion.      Objective:     Vital Signs (Most Recent):  Temp: 98.7 °F (37.1 °C) (01/01/25 0736)  Pulse: 87 (01/01/25 1145)  Resp: 14 (01/01/25 0736)  BP: 130/61 (01/01/25 0736)  SpO2: 96 % (01/01/25 0736) Vital Signs (24h Range):  Temp:  [98 °F (36.7 °C)-98.7 °F (37.1 °C)] 98.7 °F (37.1 °C)  Pulse:  [76-87] 87  Resp:  [14-18] 14  SpO2:  [94 %-100 %] 96 %  BP: (124-155)/(56-70) 130/61     Weight: 56.8 kg (125 lb 1.8 oz)  Body mass index is 25.27 kg/m².    Intake/Output Summary (Last 24 hours) at 1/1/2025 1452  Last data filed at 1/1/2025 1417  Gross per 24 hour   Intake 2430.08 ml   Output 1125 ml   Net 1305.08  ml         Physical Exam  Vitals and nursing note reviewed.   Constitutional:       General: She is not in acute distress.     Appearance: She is ill-appearing (Chronically ill-looking).   HENT:      Head: Normocephalic and atraumatic.      Mouth/Throat:      Mouth: Mucous membranes are moist.   Eyes:      General: Scleral icterus present.   Cardiovascular:      Rate and Rhythm: Normal rate and regular rhythm.   Pulmonary:      Effort: Pulmonary effort is normal. No respiratory distress.   Abdominal:      General: There is distension.      Tenderness: There is abdominal tenderness (mild).   Musculoskeletal:      Right lower leg: No edema.      Left lower leg: No edema.   Skin:     General: Skin is warm and dry.      Coloration: Skin is jaundiced (mild).   Neurological:      General: No focal deficit present.      Mental Status: She is alert and oriented to person, place, and time.   Psychiatric:         Mood and Affect: Mood normal.         Behavior: Behavior normal.             Significant Labs: All pertinent labs within the past 24 hours have been reviewed.    Significant Imaging: I have reviewed all pertinent imaging results/findings within the past 24 hours.    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     The patient's most recent sodium results are listed below.  Recent Labs     12/31/24  1840 01/01/25  0002 01/01/25  0506   * 124* 122*  123*       Plan  - Correct the sodium by 4-6mEq in 24 hours.   - Obtain the following studies: Urine sodium,  urine osmolality, serum osmolality.   - Repeat urine studies ordered on 1/1/25 after no significant improvement in hyponatremia was noted  - Will treat the hyponatremia with IV fluids as needed and Fluid restriction of:  1.5 liter per day.  - 500cc one time bolus of NS given.  - Albumin 25mg TID  - Monitor sodium Every 12 hours.   - Nephrology consulted        JACQUE (acute kidney injury)  JACQUE is likely due to  hypovolemia vs hepatorenal syndrome . Urine output remains low and patient has required 2 straight catheterizations during this admission. Baseline creatinine is  1.0-1.1 . Most recent creatinine and eGFR are listed below.  Recent Labs     12/31/24  1840 01/01/25  0002 01/01/25  0506   CREATININE 1.5* 1.5* 1.4  1.6*   EGFRNORACEVR 40.6* 40.6* 44.2*  37.6*        Plan  - JACQUE is worsening.  - Nephrology consulted for recommendations and evaluation for initiation of terlipressin   - Nephrology recommending against terlipressin.    - Recommend mei catheter for strict I and O monitoring -- patient declined but has been urinating well into [facility] for monitoring    - Recommend albumin 25g TID x 48 hours  - Albumin 50g BID  - Avoid nephrotoxins and renally dose meds for GFR listed above  - Monitor urine output, serial BMP, and adjust therapy as needed    Cirrhosis of liver with ascites  Patient with known history of cirrhosis.    MELD-Na score calculated; MELD 3.0: 28 at 12/31/2024  6:40 PM  MELD-Na: 27 at 12/31/2024  6:40 PM  Calculated from:  Serum Creatinine: 1.5 mg/dL at 12/31/2024  6:40 PM  Serum Sodium: 124 mmol/L (Using min of 125 mmol/L) at 12/31/2024  6:40 PM  Total Bilirubin: 5.2 mg/dL at 12/31/2024  6:09 AM  Serum Albumin: 2.8 g/dL at 12/31/2024  6:09 AM  INR(ratio): 1.3 at 12/29/2024  9:47 AM  Age at listing (hypothetical): 54 years  Sex: Female at 12/31/2024  6:40 PM      Plan:  -- Continue lactulose TID; titrate to 3-4 bowel movements per day.   -- Avoid any hepatotoxic meds  -- Monitor CBC/CMP/INR for  synthetic function.  -- IM6 team consulted for possible diagnostic paracentesis; bedside US showed no available fluid pocket to drain; abdominal distension likely associated with gas. Suspicion for SBP at this time is low but CTX/albumin initiated in the setting of abdominal distention and pain.   -- Hepatology consulted - patient is not a candidate for retransplantation at this time since she is high risk given history of multiple surgeries after perforated gastric ulcer. Patient plans to seek a second opinion at [facility], [facility] or [place].     Headache  -- PRN tylenol   -- Will consider migraine cocktail if needed      RUQ abdominal pain  Pt has had complaints of abdominal pain throughout hospital stay -- she received 2 doses of dilaudid and is now receiving spot doses of oxycodone 5mg prn. Although suspicion for SBP is initially low, patient remains distended and with mild tenderness in the abdomen. Will treat for SBP.    Plan:  -- Pain medication as appropriate  -- CTX 1g QD for 5 days    Acquired hypothyroidism  -- Continue home Levothyroxine    S/P liver transplant 9/23/2002 for von Gierke disease  known liver transplant in 9/2002.     Plan:  -- Hepatology consulted to assist in immunosuppressive medicine management; recommending continuing home tacrolimus 0.5mg BID  -- Daily tacrolimus level and CMP        VTE Risk Mitigation (From admission, onward)           Ordered     heparin (porcine) injection 5,000 Units  Every 8 hours         12/29/24 1331     Place sequential compression device  Until discontinued         12/29/24 1331     IP VTE LOW RISK PATIENT  Once         12/29/24 1331     Place sequential compression device  Until discontinued         12/29/24 1254                    Discharge Planning   MARILEE: 1/3/2025     Code Status: Full Code   Medical Readiness for Discharge Date:   Discharge Plan A: Home with family            Janet Casillas DO  Department of Hospital Medicine   [facility] -  Transplant Stepdown

## 2025-01-01 NOTE — ASSESSMENT & PLAN NOTE
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     The patient's most recent sodium results are listed below.  Recent Labs     12/31/24  1840 01/01/25  0002 01/01/25  0506   * 124* 122*  123*       Plan  - Correct the sodium by 4-6mEq in 24 hours.   - Obtain the following studies: Urine sodium, urine osmolality, serum osmolality.   - Repeat urine studies ordered on 1/1/25 after no significant improvement in hyponatremia was noted  - Will treat the hyponatremia with IV fluids as needed and Fluid restriction of:  1.5 liter per day.  - 500cc one time bolus of NS given.  - Albumin 25mg TID  - Monitor sodium Every 12 hours.   - Nephrology consulted

## 2025-01-01 NOTE — PLAN OF CARE
Pt is AAOx4, ambulatory independently. RA. Afebrile. Tele, left bundle branch block, NSR. Abdomen distended. Skin jaundice and sclera yellow. 1500 L FR in place. 1x oxy given PRN for pain. Pt c/o of indigestion and PRN calcium carbonate given. Scheduled meds given. BMP  Q6. K+ 5.7, lokemia given. Bed locked in lowest position with call light within reach. Pt instructed to call for any assistance.

## 2025-01-01 NOTE — SUBJECTIVE & OBJECTIVE
Interval History: Pt doing well today and stating that her abdominal pain has improved. Patient expressing significant interest in going to [facility] for a second opinion in regards to liver transplant candidacy.     Review of Systems   Constitutional:  Negative for fever.   HENT:  Negative for congestion.    Eyes: Negative.    Respiratory:  Negative for shortness of breath.    Cardiovascular:  Negative for leg swelling.   Gastrointestinal:  Positive for abdominal distention and abdominal pain. Negative for vomiting.   Endocrine: Negative.    Genitourinary: Negative.    Musculoskeletal: Negative.    Skin: Negative.    Allergic/Immunologic: Negative.    Neurological: Negative.    Hematological: Negative.    Psychiatric/Behavioral:  Positive for confusion.      Objective:     Vital Signs (Most Recent):  Temp: 98.7 °F (37.1 °C) (01/01/25 0736)  Pulse: 87 (01/01/25 1145)  Resp: 14 (01/01/25 0736)  BP: 130/61 (01/01/25 0736)  SpO2: 96 % (01/01/25 0736) Vital Signs (24h Range):  Temp:  [98 °F (36.7 °C)-98.7 °F (37.1 °C)] 98.7 °F (37.1 °C)  Pulse:  [76-87] 87  Resp:  [14-18] 14  SpO2:  [94 %-100 %] 96 %  BP: (124-155)/(56-70) 130/61     Weight: 56.8 kg (125 lb 1.8 oz)  Body mass index is 25.27 kg/m².    Intake/Output Summary (Last 24 hours) at 1/1/2025 1452  Last data filed at 1/1/2025 1417  Gross per 24 hour   Intake 2430.08 ml   Output 1125 ml   Net 1305.08 ml         Physical Exam  Vitals and nursing note reviewed.   Constitutional:       General: She is not in acute distress.     Appearance: She is ill-appearing (Chronically ill-looking).   HENT:      Head: Normocephalic and atraumatic.      Mouth/Throat:      Mouth: Mucous membranes are moist.   Eyes:      General: Scleral icterus present.   Cardiovascular:      Rate and Rhythm: Normal rate and regular rhythm.   Pulmonary:      Effort: Pulmonary effort is normal. No respiratory distress.   Abdominal:      General: There is distension.      Tenderness: There is  abdominal tenderness (mild).   Musculoskeletal:      Right lower leg: No edema.      Left lower leg: No edema.   Skin:     General: Skin is warm and dry.      Coloration: Skin is jaundiced (mild).   Neurological:      General: No focal deficit present.      Mental Status: She is alert and oriented to person, place, and time.   Psychiatric:         Mood and Affect: Mood normal.         Behavior: Behavior normal.             Significant Labs: All pertinent labs within the past 24 hours have been reviewed.    Significant Imaging: I have reviewed all pertinent imaging results/findings within the past 24 hours.

## 2025-01-01 NOTE — ASSESSMENT & PLAN NOTE
Pt has had complaints of abdominal pain throughout hospital stay -- she received 2 doses of dilaudid and is now receiving spot doses of oxycodone 5mg prn. Although suspicion for SBP is initially low, patient remains distended and with mild tenderness in the abdomen. Will treat for SBP.    Plan:  -- Pain medication as appropriate  -- CTX 1g QD for 5 days

## 2025-01-01 NOTE — ASSESSMENT & PLAN NOTE
Patient with known history of cirrhosis.    MELD-Na score calculated; MELD 3.0: 28 at 12/31/2024  6:40 PM  MELD-Na: 27 at 12/31/2024  6:40 PM  Calculated from:  Serum Creatinine: 1.5 mg/dL at 12/31/2024  6:40 PM  Serum Sodium: 124 mmol/L (Using min of 125 mmol/L) at 12/31/2024  6:40 PM  Total Bilirubin: 5.2 mg/dL at 12/31/2024  6:09 AM  Serum Albumin: 2.8 g/dL at 12/31/2024  6:09 AM  INR(ratio): 1.3 at 12/29/2024  9:47 AM  Age at listing (hypothetical): 54 years  Sex: Female at 12/31/2024  6:40 PM      Plan:  -- Continue lactulose TID; titrate to 3-4 bowel movements per day.   -- Avoid any hepatotoxic meds  -- Monitor CBC/CMP/INR for synthetic function.  -- IM6 team consulted for possible diagnostic paracentesis; bedside US showed no available fluid pocket to drain; abdominal distension likely associated with gas. Suspicion for SBP at this time is low but CTX/albumin initiated in the setting of abdominal distention and pain.   -- Hepatology consulted - patient is not a candidate for retransplantation at this time since she is high risk given history of multiple surgeries after perforated gastric ulcer. Patient plans to seek a second opinion at [facility], [facility] or [facility].

## 2025-01-01 NOTE — NURSING
Contacted TALI Cruz, concerning pt unable to void. Bladder scanned pt and a total of 389cc of urine. MD Smith, gave okay to in/out patient.

## 2025-01-01 NOTE — ASSESSMENT & PLAN NOTE
JACQUE is likely due to  hypovolemia vs hepatorenal syndrome . Urine output remains low and patient has required 2 straight catheterizations during this admission. Baseline creatinine is  1.0-1.1 . Most recent creatinine and eGFR are listed below.  Recent Labs     12/31/24  1840 01/01/25  0002 01/01/25  0506   CREATININE 1.5* 1.5* 1.4  1.6*   EGFRNORACEVR 40.6* 40.6* 44.2*  37.6*        Plan  - JACQUE is worsening.  - Nephrology consulted for recommendations and evaluation for initiation of terlipressin   - Nephrology recommending against terlipressin.    - Recommend mei catheter for strict I and O monitoring -- patient declined but has been urinating well into hat for monitoring    - Recommend albumin 25g TID x 48 hours  - Albumin 50g BID  - Avoid nephrotoxins and renally dose meds for GFR listed above  - Monitor urine output, serial BMP, and adjust therapy as needed

## 2025-01-02 PROBLEM — D64.9 ANEMIA: Status: ACTIVE | Noted: 2025-01-02

## 2025-01-02 LAB
ABO + RH BLD: NORMAL
ALBUMIN SERPL BCP-MCNC: 3.6 G/DL (ref 3.5–5.2)
ALP SERPL-CCNC: 212 U/L (ref 40–150)
ALT SERPL W/O P-5'-P-CCNC: 22 U/L (ref 10–44)
ANION GAP SERPL CALC-SCNC: 8 MMOL/L (ref 8–16)
AST SERPL-CCNC: 41 U/L (ref 10–40)
BASOPHILS # BLD AUTO: 0 K/UL (ref 0–0.2)
BASOPHILS # BLD AUTO: 0.02 K/UL (ref 0–0.2)
BASOPHILS NFR BLD: 0 % (ref 0–1.9)
BASOPHILS NFR BLD: 0.7 % (ref 0–1.9)
BILIRUB SERPL-MCNC: 4.4 MG/DL (ref 0.1–1)
BLD GP AB SCN CELLS X3 SERPL QL: NORMAL
BLD PROD TYP BPU: NORMAL
BLOOD UNIT EXPIRATION DATE: NORMAL
BLOOD UNIT TYPE CODE: 7300
BLOOD UNIT TYPE: NORMAL
BUN SERPL-MCNC: 39 MG/DL (ref 6–20)
CALCIUM SERPL-MCNC: 9.4 MG/DL (ref 8.7–10.5)
CHLORIDE SERPL-SCNC: 99 MMOL/L (ref 95–110)
CO2 SERPL-SCNC: 19 MMOL/L (ref 23–29)
CODING SYSTEM: NORMAL
CREAT SERPL-MCNC: 1.6 MG/DL (ref 0.5–1.4)
CROSSMATCH INTERPRETATION: NORMAL
DIFFERENTIAL METHOD BLD: ABNORMAL
DIFFERENTIAL METHOD BLD: ABNORMAL
DISPENSE STATUS: NORMAL
EOSINOPHIL # BLD AUTO: 0.1 K/UL (ref 0–0.5)
EOSINOPHIL # BLD AUTO: 0.1 K/UL (ref 0–0.5)
EOSINOPHIL NFR BLD: 3.9 % (ref 0–8)
EOSINOPHIL NFR BLD: 4.5 % (ref 0–8)
ERYTHROCYTE [DISTWIDTH] IN BLOOD BY AUTOMATED COUNT: 14.6 % (ref 11.5–14.5)
ERYTHROCYTE [DISTWIDTH] IN BLOOD BY AUTOMATED COUNT: 14.6 % (ref 11.5–14.5)
EST. GFR  (NO RACE VARIABLE): 37.6 ML/MIN/1.73 M^2
GLUCOSE SERPL-MCNC: 81 MG/DL (ref 70–110)
HCT VFR BLD AUTO: 20.4 % (ref 37–48.5)
HCT VFR BLD AUTO: 20.9 % (ref 37–48.5)
HGB BLD-MCNC: 6.7 G/DL (ref 12–16)
HGB BLD-MCNC: 7.1 G/DL (ref 12–16)
IMM GRANULOCYTES # BLD AUTO: 0.01 K/UL (ref 0–0.04)
IMM GRANULOCYTES # BLD AUTO: 0.02 K/UL (ref 0–0.04)
IMM GRANULOCYTES NFR BLD AUTO: 0.3 % (ref 0–0.5)
IMM GRANULOCYTES NFR BLD AUTO: 0.6 % (ref 0–0.5)
LYMPHOCYTES # BLD AUTO: 0.4 K/UL (ref 1–4.8)
LYMPHOCYTES # BLD AUTO: 0.5 K/UL (ref 1–4.8)
LYMPHOCYTES NFR BLD: 13.5 % (ref 18–48)
LYMPHOCYTES NFR BLD: 14.4 % (ref 18–48)
MAGNESIUM SERPL-MCNC: 2.8 MG/DL (ref 1.6–2.6)
MCH RBC QN AUTO: 29.4 PG (ref 27–31)
MCH RBC QN AUTO: 30.3 PG (ref 27–31)
MCHC RBC AUTO-ENTMCNC: 32.8 G/DL (ref 32–36)
MCHC RBC AUTO-ENTMCNC: 34 G/DL (ref 32–36)
MCV RBC AUTO: 89 FL (ref 82–98)
MCV RBC AUTO: 90 FL (ref 82–98)
MONOCYTES # BLD AUTO: 0.6 K/UL (ref 0.3–1)
MONOCYTES # BLD AUTO: 0.7 K/UL (ref 0.3–1)
MONOCYTES NFR BLD: 20.4 % (ref 4–15)
MONOCYTES NFR BLD: 23.7 % (ref 4–15)
NEUTROPHILS # BLD AUTO: 1.8 K/UL (ref 1.8–7.7)
NEUTROPHILS # BLD AUTO: 1.9 K/UL (ref 1.8–7.7)
NEUTROPHILS NFR BLD: 57.9 % (ref 38–73)
NEUTROPHILS NFR BLD: 60.1 % (ref 38–73)
NRBC BLD-RTO: 0 /100 WBC
NRBC BLD-RTO: 0 /100 WBC
NUM UNITS TRANS PACKED RBC: NORMAL
PHOSPHATE SERPL-MCNC: 2.9 MG/DL (ref 2.7–4.5)
PLATELET # BLD AUTO: 51 K/UL (ref 150–450)
PLATELET # BLD AUTO: 53 K/UL (ref 150–450)
PMV BLD AUTO: 11.1 FL (ref 9.2–12.9)
PMV BLD AUTO: 11.4 FL (ref 9.2–12.9)
POTASSIUM SERPL-SCNC: 4.9 MMOL/L (ref 3.5–5.1)
PROT SERPL-MCNC: 6 G/DL (ref 6–8.4)
RBC # BLD AUTO: 2.28 M/UL (ref 4–5.4)
RBC # BLD AUTO: 2.34 M/UL (ref 4–5.4)
SODIUM SERPL-SCNC: 126 MMOL/L (ref 136–145)
SPECIMEN OUTDATE: NORMAL
TACROLIMUS BLD-MCNC: 5.1 NG/ML (ref 5–15)
WBC # BLD AUTO: 3.04 K/UL (ref 3.9–12.7)
WBC # BLD AUTO: 3.13 K/UL (ref 3.9–12.7)

## 2025-01-02 PROCEDURE — 36430 TRANSFUSION BLD/BLD COMPNT: CPT

## 2025-01-02 PROCEDURE — 63600175 PHARM REV CODE 636 W HCPCS

## 2025-01-02 PROCEDURE — 86850 RBC ANTIBODY SCREEN: CPT

## 2025-01-02 PROCEDURE — P9047 ALBUMIN (HUMAN), 25%, 50ML: HCPCS

## 2025-01-02 PROCEDURE — 84100 ASSAY OF PHOSPHORUS: CPT

## 2025-01-02 PROCEDURE — 83735 ASSAY OF MAGNESIUM: CPT

## 2025-01-02 PROCEDURE — P9047 ALBUMIN (HUMAN), 25%, 50ML: HCPCS | Performed by: HOSPITALIST

## 2025-01-02 PROCEDURE — 80197 ASSAY OF TACROLIMUS: CPT

## 2025-01-02 PROCEDURE — 25000003 PHARM REV CODE 250

## 2025-01-02 PROCEDURE — 80053 COMPREHEN METABOLIC PANEL: CPT

## 2025-01-02 PROCEDURE — 86920 COMPATIBILITY TEST SPIN: CPT

## 2025-01-02 PROCEDURE — P9016 RBC LEUKOCYTES REDUCED: HCPCS

## 2025-01-02 PROCEDURE — 25000003 PHARM REV CODE 250: Performed by: HOSPITALIST

## 2025-01-02 PROCEDURE — 99233 SBSQ HOSP IP/OBS HIGH 50: CPT | Mod: ,,, | Performed by: INTERNAL MEDICINE

## 2025-01-02 PROCEDURE — 30233N1 TRANSFUSION OF NONAUTOLOGOUS RED BLOOD CELLS INTO PERIPHERAL VEIN, PERCUTANEOUS APPROACH: ICD-10-PCS | Performed by: HOSPITALIST

## 2025-01-02 PROCEDURE — 85025 COMPLETE CBC W/AUTO DIFF WBC: CPT

## 2025-01-02 PROCEDURE — 20600001 HC STEP DOWN PRIVATE ROOM

## 2025-01-02 PROCEDURE — 36415 COLL VENOUS BLD VENIPUNCTURE: CPT

## 2025-01-02 PROCEDURE — 63600175 PHARM REV CODE 636 W HCPCS: Performed by: HOSPITALIST

## 2025-01-02 PROCEDURE — 85025 COMPLETE CBC W/AUTO DIFF WBC: CPT | Mod: 91

## 2025-01-02 RX ORDER — HYDROCODONE BITARTRATE AND ACETAMINOPHEN 500; 5 MG/1; MG/1
TABLET ORAL
Status: DISCONTINUED | OUTPATIENT
Start: 2025-01-02 | End: 2025-01-03

## 2025-01-02 RX ORDER — OXYCODONE HYDROCHLORIDE 5 MG/1
5 TABLET ORAL EVERY 6 HOURS PRN
Status: DISCONTINUED | OUTPATIENT
Start: 2025-01-02 | End: 2025-01-03

## 2025-01-02 RX ADMIN — ALBUMIN (HUMAN) 25 G: 5 SOLUTION INTRAVENOUS at 09:01

## 2025-01-02 RX ADMIN — LACTULOSE 10 G: 20 SOLUTION ORAL at 08:01

## 2025-01-02 RX ADMIN — VENLAFAXINE HYDROCHLORIDE 150 MG: 75 CAPSULE, EXTENDED RELEASE ORAL at 08:01

## 2025-01-02 RX ADMIN — LACTULOSE 10 G: 20 SOLUTION ORAL at 03:01

## 2025-01-02 RX ADMIN — SODIUM CHLORIDE: 9 INJECTION, SOLUTION INTRAVENOUS at 02:01

## 2025-01-02 RX ADMIN — TACROLIMUS 0.5 MG: 0.5 CAPSULE ORAL at 08:01

## 2025-01-02 RX ADMIN — RIFAXIMIN 550 MG: 550 TABLET ORAL at 08:01

## 2025-01-02 RX ADMIN — ALBUMIN (HUMAN) 25 G: 5 SOLUTION INTRAVENOUS at 03:01

## 2025-01-02 RX ADMIN — OXYCODONE 5 MG: 5 TABLET ORAL at 09:01

## 2025-01-02 RX ADMIN — RIFAXIMIN 550 MG: 550 TABLET ORAL at 12:01

## 2025-01-02 RX ADMIN — TACROLIMUS 0.5 MG: 0.5 CAPSULE ORAL at 05:01

## 2025-01-02 RX ADMIN — HEPARIN SODIUM 5000 UNITS: 5000 INJECTION INTRAVENOUS; SUBCUTANEOUS at 08:01

## 2025-01-02 RX ADMIN — OXYCODONE 5 MG: 5 TABLET ORAL at 08:01

## 2025-01-02 RX ADMIN — CEFTRIAXONE 1 G: 1 INJECTION, POWDER, FOR SOLUTION INTRAMUSCULAR; INTRAVENOUS at 08:01

## 2025-01-02 RX ADMIN — ONDANSETRON 4 MG: 2 INJECTION INTRAMUSCULAR; INTRAVENOUS at 01:01

## 2025-01-02 RX ADMIN — HEPARIN SODIUM 5000 UNITS: 5000 INJECTION INTRAVENOUS; SUBCUTANEOUS at 03:01

## 2025-01-02 RX ADMIN — LEVOTHYROXINE SODIUM 75 MCG: 75 TABLET ORAL at 05:01

## 2025-01-02 RX ADMIN — ALBUMIN (HUMAN) 25 G: 5 SOLUTION INTRAVENOUS at 08:01

## 2025-01-02 NOTE — ASSESSMENT & PLAN NOTE
JACQUE is likely due to  hypovolemia vs hepatorenal syndrome . Urine output remains low and patient has required 2 straight catheterizations during this admission. Baseline creatinine is  1.0-1.1 . Most recent creatinine and eGFR are listed below.  Recent Labs     01/01/25  0506 01/01/25  1504 01/02/25  0531   CREATININE 1.4  1.6* 1.4 1.6*   EGFRNORACEVR 44.2*  37.6* 44.2* 37.6*        Plan  - JACQUE is worsening.  - Nephrology consulted for recommendations and evaluation for initiation of terlipressin   - Nephrology recommending against terlipressin.    - Recommend mei catheter for strict I and O monitoring -- patient declined but has been urinating well into hat for monitoring    - Recommend albumin 25g TID for 24 more hours  - Albumin 50g BID  - Avoid nephrotoxins and renally dose meds for GFR listed above  - Monitor urine output, serial BMP, and adjust therapy as needed

## 2025-01-02 NOTE — ASSESSMENT & PLAN NOTE
Anemia is likely due to chronic disease due to Chronic liver disease. Most recent hemoglobin and hematocrit are listed below.  Recent Labs     01/01/25  0506 01/02/25  0531 01/02/25  0812   HGB 8.1* 6.7* 7.1*   HCT 24.5* 20.4* 20.9*     Plan  - Monitor serial CBC: Daily  - Transfuse PRBC if patient becomes hemodynamically unstable, symptomatic or H/H drops below 7/21.  - Patient has received 1 units of PRBCs on 1/2/2025  - Patient's anemia is currently stable

## 2025-01-02 NOTE — PROGRESS NOTES
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  She has has this problem in the past which has improved with holding diuretics and administering NS + albumin.  Home diuretics consist of Lasix and she has been holding spironolactone since her previous discharge from the hospital.  On this admission, Na 118, SOSM 262, Meghna 13.  She was given 500 ml NS bolus and started on albumin with improvement in sodium, up to 125 @ time of consultation.  Since admission, she has had urinary retention requiring intermittent catheterizations.  Attempts at paracentesis have been unsuccessful.  SCr increased to 1.6 on 12/31 and Nephrology was consulted over concerns for HRS and possible need for terlipressin.    Interval History:   Kidney function stable, Na improved to 126 and stable.  UOP 1.2L/24h.      Review of patient's allergies indicates:   Allergen Reactions    Codeine Itching     Other reaction(s): Itching    Lipitor [atorvastatin] Other (See Comments)     Other reaction(s): Muscle pain  Muscle cranmps    Morphine Itching     Other reaction(s): nausea and vomiting     Zoloft [sertraline] Other (See Comments)     Tremors/muscle spasms     Current Facility-Administered Medications   Medication Frequency    0.9%  NaCl infusion (for blood administration) Q24H PRN    acetaminophen tablet 650 mg Q8H PRN    albumin human 25% bottle 25 g TID    bisacodyL suppository 10  mg Daily PRN    calcium carbonate 200 mg calcium (500 mg) chewable tablet 500 mg BID PRN    cefTRIAXone injection 1 g Daily    dextrose 50% injection 12.5 g PRN    dextrose 50% injection 25 g PRN    glucagon (human recombinant) injection 1 mg PRN    glucose chewable tablet 16 g PRN    glucose chewable tablet 24 g PRN    heparin (porcine) injection 5,000 Units Q8H    hydrocortisone 2.5 % cream BID PRN    lactulose 20 gram/30 mL solution Soln 10 g TID    levothyroxine tablet 75 mcg Daily    melatonin tablet 6 mg Nightly PRN    naloxone 0.4 mg/mL injection 0.02 mg PRN    ondansetron injection 4 mg Q6H PRN    oxyCODONE immediate release tablet 5 mg Q6H PRN    rifAXIMin tablet 550 mg BID    sodium chloride 0.9% flush 10 mL Q12H PRN    tacrolimus capsule 0.5 mg BID    venlafaxine 24 hr capsule 150 mg Daily       Objective:     Vital Signs (Most Recent):  Temp: 98.5 °F (36.9 °C) (01/02/25 0721)  Pulse: 77 (01/02/25 0721)  Resp: 16 (01/02/25 0950)  BP: (!) 117/56 (01/02/25 0721)  SpO2: 100 % (01/02/25 0721) Vital Signs (24h Range):  Temp:  [89.6 °F (32 °C)-98.6 °F (37 °C)] 98.5 °F (36.9 °C)  Pulse:  [77-85] 77  Resp:  [14-20] 16  SpO2:  [97 %-100 %] 100 %  BP: (117-154)/(56-68) 117/56     Weight: 56.8 kg (125 lb 1.8 oz) (12/30/24 0528)  Body mass index is 25.27 kg/m².  Body surface area is 1.54 meters squared.    I/O last 3 completed shifts:  In: 2367.1 [P.O.:1925; I.V.:442.1]  Out: 1825 [Urine:1825]     Physical Exam  Vitals and nursing note reviewed.   Constitutional:       General: She is not in acute distress.     Appearance: She is ill-appearing (Chronically ill-looking).   HENT:      Head: Normocephalic and atraumatic.      Mouth/Throat:      Mouth: Mucous membranes are moist.   Cardiovascular:      Rate and Rhythm: Normal rate and regular rhythm.   Pulmonary:      Effort: Pulmonary effort is normal. No respiratory distress.   Abdominal:      General: There is distension.      Tenderness: There is abdominal tenderness  (mild).   Musculoskeletal:      Right lower leg: No edema.      Left lower leg: No edema.   Skin:     General: Skin is warm and dry.      Coloration: Skin is jaundiced (mild).   Neurological:      General: No focal deficit present.      Mental Status: She is alert and oriented to person, place, and time.   Psychiatric:         Mood and Affect: Mood normal.         Behavior: Behavior normal.          Significant Labs:  CBC:   Recent Labs   Lab 01/02/25  0812   WBC 3.13*   RBC 2.34*   HGB 7.1*   HCT 20.9*   PLT 53*   MCV 89   MCH 30.3   MCHC 34.0     CMP:   Recent Labs   Lab 01/02/25  0531   GLU 81   CALCIUM 9.4   ALBUMIN 3.6   PROT 6.0   *   K 4.9   CO2 19*   CL 99   BUN 39*   CREATININE 1.6*   ALKPHOS 212*   ALT 22   AST 41*   BILITOT 4.4*        Assessment/Plan:     Renal/  JACQUE (acute kidney injury)  JACQUE on CKD 3a  Stable  SCr 1.6 @ time of consultation remains stable  Likely related to her ongoing urinary retention with underlying volume depletion      Recommend starting albumin 25g TID x 24 hours  Low suspicious for HRS as MAPS have been ranged between   Will repeat urine studies tomorrow  Check renal US    Endocrine  * Hyponatremia  Acute on chronic  Meghna 13, SOSM 262  UOSM 208.    On home lasix; similar problem has happened back in August of this year.    Suspect hypovolemic hyponatremia but could also be related to her underlying liver disease.    Recommend holding lasix  Recommend starting albumin 25 g TID x 24 hours  Repeat urine goran Gill NP  Nephrology  [facility] - Transplant Stepdown

## 2025-01-02 NOTE — PLAN OF CARE
Additional dose lactulose & PRN suppository administered (no BM this shift prior to those, and spouse had noted increasing tremors and instability with mobility).  1x BM afterward.  1x dose oxy for abdominal pain

## 2025-01-02 NOTE — ASSESSMENT & PLAN NOTE
JACQUE on CKD 3a  Stable  SCr 1.6 @ time of consultation remains stable  Likely related to her ongoing urinary retention with underlying volume depletion      Recommend starting albumin 25g TID x 24 hours  Low suspicious for HRS as MAPS have been ranged between   Will repeat urine studies tomorrow  Check renal US

## 2025-01-02 NOTE — ASSESSMENT & PLAN NOTE
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     The patient's most recent sodium results are listed below.  Recent Labs     01/01/25  0506 01/01/25  1504 01/02/25  0531   *  123* 124* 126*       Plan  - Correct the sodium by 4-6mEq in 24 hours.   - Obtain the following studies: Urine sodium, urine osmolality, serum osmolality.   - Repeat urine studies ordered on 1/1/25 after no significant improvement in hyponatremia was noted -- no evidence of SIADH.  - Will treat the hyponatremia with IV fluids as needed   - 500cc one time bolus of NS given.  - Monitor sodium Every 12 hours.   - Fluid restriction discontinued.  - Nephrology consulted; recommending Albumin 25mg TID for 24 more hours

## 2025-01-02 NOTE — ASSESSMENT & PLAN NOTE
Patient with known history of cirrhosis.    MELD-Na score calculated; MELD 3.0: 28 at 12/31/2024  6:40 PM  MELD-Na: 27 at 12/31/2024  6:40 PM  Calculated from:  Serum Creatinine: 1.5 mg/dL at 12/31/2024  6:40 PM  Serum Sodium: 124 mmol/L (Using min of 125 mmol/L) at 12/31/2024  6:40 PM  Total Bilirubin: 5.2 mg/dL at 12/31/2024  6:09 AM  Serum Albumin: 2.8 g/dL at 12/31/2024  6:09 AM  INR(ratio): 1.3 at 12/29/2024  9:47 AM  Age at listing (hypothetical): 54 years  Sex: Female at 12/31/2024  6:40 PM      Plan:  -- Continue lactulose TID; titrate to 3-4 bowel movements per day.   -- Avoid any hepatotoxic meds  -- Monitor CBC/CMP/INR for synthetic function.  -- IM6 team consulted for possible diagnostic paracentesis; bedside US showed no available fluid pocket to drain; abdominal distension likely associated with gas. Suspicion for SBP at this time is low but CTX/albumin initiated in the setting of abdominal distention and pain.   -- Hepatology consulted - patient is not a candidate for retransplantation at this time since she is high risk given history of multiple surgeries after perforated gastric ulcer. Patient plans to seek a second opinion at [facility], [facility] or [place].

## 2025-01-02 NOTE — PROGRESS NOTES
[facility] - Transplant [facility] Medicine  Progress Note    Patient Name: Elda Hernandez  MRN: [id number]  Patient Class: IP- Inpatient   Admission Date: 12/29/2024  Length of Stay: 4 days  Attending Physician: Amber Guido*  Primary Care Provider: David Lorenzo MD        Subjective     Principal Problem:Hyponatremia        HPI:  Ms. 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 She presents to [facility] ED today with her spouse d/o hyponatremia, she gets routine lab, the recent lab shows sodium of 118 and she was told by her provider to come to the ED. Her chronic medications include Lasix, spironolactone, tacrolimus, levothyroxine, venlafaxine, ramipril, and pantoprazole. The patient reported that she has not been taking her antidiuretic and antihypertensive medications recently, except for Lasix, due to concerns about hyponatremia. She appears slightly confused, though this seems consistent with her baseline. She denies chest pain, fever, or chills.    In the ED, Afebrile, SBP slightly elevated (160), otherwise vitals were stable. Labs were remarkable for Na- 121, K-5.1, Tbil- 5.3, Alb- 2.4, ALP- 438, AST- 97, ALT- 49, PT- 14.4. Urine Osm pending.    Admit to hospital medicine for further work-up and management of hyponatremia.      Overview/Hospital Course:  Pt admitted to hospital medicine for further workup and management of hyponatremia likely associated with hypovolemia vs cirrhosis vs medication noncompliance vs SSRI use. IM6 consulted for diagnostic paracentesis but  no fluid pocket was noted on US. 500 cc NS bolus given and fluid restriction liberalized from 800 mL daily to 1.5L daily. Nephrology consulted for JACQUE and recommended mei catheter placement which pateint declined; also recommended holding diuretics in the setting of hyponatremia. Hepatology consulted for recommendations on management of tacrolimus who recommends continuing patient's home dose 0.5mg BID. Pt required 2 straight catheterizations since admission but declined mei. Repeat urine studies ordered after hyponatremia did not significantly improve.    Interval History: Pt complaining of abdominal pain this morning but otherwise doing well.    Review of Systems   Constitutional:  Negative for fever.   HENT:  Negative for congestion.    Eyes: Negative.    Respiratory:  Negative for shortness of breath.    Cardiovascular:  Negative for leg swelling.   Gastrointestinal:  Positive for abdominal distention and abdominal pain. Negative for vomiting.   Endocrine: Negative.    Genitourinary: Negative.    Musculoskeletal: Negative.    Skin: Negative.    Allergic/Immunologic: Negative.    Neurological: Negative.    Hematological: Negative.    Psychiatric/Behavioral:  Positive for confusion.      Objective:     Vital Signs (Most Recent):  Temp: 98.5 °F (36.9 °C) (01/02/25 0721)  Pulse: 77 (01/02/25 0721)  Resp: 18 (01/02/25 0458)  BP: (!) 117/56 (01/02/25 0721)  SpO2: 100 % (01/02/25 0721) Vital Signs (24h Range):  Temp:  [89.6 °F (32 °C)-98.6 °F (37 °C)] 98.5 °F (36.9 °C)  Pulse:  [77-87] 77  Resp:  [14-20] 18  SpO2:  [97 %-100 %] 100 %  BP: (117-154)/(56-68) 117/56     Weight: 56.8 kg (125 lb 1.8 oz)  Body mass index is 25.27 kg/m².    Intake/Output Summary (Last 24 hours) at 1/2/2025 0930  Last data filed at 1/2/2025 0503  Gross per 24 hour   Intake 1413.08 ml   Output 875 ml   Net 538.08 ml         Physical Exam  Vitals and nursing note reviewed.   Constitutional:       General: She is not in acute distress.      Appearance: She is ill-appearing (Chronically ill-looking).   HENT:      Head: Normocephalic and atraumatic.      Mouth/Throat:      Mouth: Mucous membranes are moist.   Eyes:      General: Scleral icterus present.   Cardiovascular:      Rate and Rhythm: Normal rate and regular rhythm.   Pulmonary:      Effort: Pulmonary effort is normal. No respiratory distress.   Abdominal:      General: There is distension.      Tenderness: There is abdominal tenderness (mild).   Musculoskeletal:      Right lower leg: No edema.      Left lower leg: No edema.   Skin:     General: Skin is warm and dry.      Coloration: Skin is jaundiced (mild).   Neurological:      General: No focal deficit present.      Mental Status: She is alert and oriented to person, place, and time.   Psychiatric:         Mood and Affect: Mood normal.         Behavior: Behavior normal.             Significant Labs: All pertinent labs within the past 24 hours have been reviewed.    Significant Imaging: I have reviewed all pertinent imaging results/findings within the past 24 hours.    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     The patient's most recent sodium results are listed below.  Recent Labs     01/01/25  0506 01/01/25  1504 01/02/25  0531   *  123* 124* 126*       Plan  - Correct the sodium by 4-6mEq in 24 hours.   - Obtain the following studies: Urine sodium, urine osmolality, serum osmolality.   - Repeat urine studies ordered on 1/1/25 after no significant improvement in hyponatremia  was noted -- no evidence of SIADH.  - Will treat the hyponatremia with IV fluids as needed   - 500cc one time bolus of NS given.  - Monitor sodium Every 12 hours.   - Fluid restriction discontinued.  - Nephrology consulted; recommending Albumin 25mg TID for 24 more hours        Anemia  Anemia is likely due to chronic disease due to Chronic liver disease. Most recent hemoglobin and hematocrit are listed below.  Recent Labs     01/01/25  0506 01/02/25  0531 01/02/25  0812   HGB 8.1* 6.7* 7.1*   HCT 24.5* 20.4* 20.9*     Plan  - Monitor serial CBC: Daily  - Transfuse PRBC if patient becomes hemodynamically unstable, symptomatic or H/H drops below 7/21.  - Patient has received 1 units of PRBCs on 1/2/2025  - Patient's anemia is currently stable    JACQUE (acute kidney injury)  JACQUE is likely due to  hypovolemia vs hepatorenal syndrome . Urine output remains low and patient has required 2 straight catheterizations during this admission. Baseline creatinine is  1.0-1.1 . Most recent creatinine and eGFR are listed below.  Recent Labs     01/01/25  0506 01/01/25  1504 01/02/25  0531   CREATININE 1.4  1.6* 1.4 1.6*   EGFRNORACEVR 44.2*  37.6* 44.2* 37.6*        Plan  - JACQUE is worsening.  - Nephrology consulted for recommendations and evaluation for initiation of terlipressin   - Nephrology recommending against terlipressin.    - Recommend mei catheter for strict I and O monitoring -- patient declined but has been urinating well into [facility] for monitoring    - Recommend albumin 25g TID for 24 more hours  - Albumin 50g BID  - Avoid nephrotoxins and renally dose meds for GFR listed above  - Monitor urine output, serial BMP, and adjust therapy as needed    Cirrhosis of liver with ascites  Patient with known history of cirrhosis.    MELD-Na score calculated; MELD 3.0: 28 at 12/31/2024  6:40 PM  MELD-Na: 27 at 12/31/2024  6:40 PM  Calculated from:  Serum Creatinine: 1.5 mg/dL at 12/31/2024  6:40 PM  Serum Sodium: 124 mmol/L (Using  min of 125 mmol/L) at 12/31/2024  6:40 PM  Total Bilirubin: 5.2 mg/dL at 12/31/2024  6:09 AM  Serum Albumin: 2.8 g/dL at 12/31/2024  6:09 AM  INR(ratio): 1.3 at 12/29/2024  9:47 AM  Age at listing (hypothetical): 54 years  Sex: Female at 12/31/2024  6:40 PM      Plan:  -- Continue lactulose TID; titrate to 3-4 bowel movements per day.   -- Avoid any hepatotoxic meds  -- Monitor CBC/CMP/INR for synthetic function.  -- IM6 team consulted for possible diagnostic paracentesis; bedside US showed no available fluid pocket to drain; abdominal distension likely associated with gas. Suspicion for SBP at this time is low but CTX/albumin initiated in the setting of abdominal distention and pain.   -- Hepatology consulted - patient is not a candidate for retransplantation at this time since she is high risk given history of multiple surgeries after perforated gastric ulcer. Patient plans to seek a second opinion at [facility], [facility] or [place].     Headache  -- PRN tylenol   -- Will consider migraine cocktail if needed      RUQ abdominal pain  Pt has had complaints of abdominal pain throughout hospital stay -- she received 2 doses of dilaudid and is now receiving spot doses of oxycodone 5mg prn. Although suspicion for SBP is initially low, patient remains distended and with mild tenderness in the abdomen. Will treat for SBP.    Plan:  -- Pain medication as appropriate  -- CTX 1g QD for 5 days    Acquired hypothyroidism  -- Continue home Levothyroxine    S/P liver transplant 9/23/2002 for von Gierke disease  known liver transplant in 9/2002.     Plan:  -- Hepatology consulted to assist in immunosuppressive medicine management; recommending continuing home tacrolimus 0.5mg BID  -- Daily tacrolimus level and CMP        VTE Risk Mitigation (From admission, onward)           Ordered     heparin (porcine) injection 5,000 Units  Every 8 hours         12/29/24 1331     Place sequential compression device  Until discontinued          12/29/24 1331     IP VTE LOW RISK PATIENT  Once         12/29/24 1331     Place sequential compression device  Until discontinued         12/29/24 1254                    Discharge Planning   MARILEE: 1/3/2025     Code Status: Full Code   Medical Readiness for Discharge Date:   Discharge Plan A: Home with family              Janet Casillas DO  Department of Hospital Medicine   [facility] - Transplant Stepdown

## 2025-01-02 NOTE — ASSESSMENT & PLAN NOTE
Acute on chronic  Meghna 13, SOSM 262  UOSM 208.    On home lasix; similar problem has happened back in August of this year.    Suspect hypovolemic hyponatremia but could also be related to her underlying liver disease.    Recommend holding lasix  Recommend starting albumin 25 g TID x 24 hours  Repeat urine lytes

## 2025-01-02 NOTE — PLAN OF CARE
Pt aaox4 vswnl and no c/o pain. Pt ambulates 1 assist. Bed in low position and callbell within reach. Albumin given. Lactuose given and total 2 bms on nightshift. Pt with ascites and jaundice. Telemetry maintained LBBB. 1500 RF . Last NA =124,K=5.2,Bun=37,UAJ=766,TB==5.1,and AST=47. Pt not candidate re-tx for liver at present but will seek second opinion at other facilities.

## 2025-01-02 NOTE — PLAN OF CARE
- Lactulose TID for HE treatment/prevention.  Pt with >5 BM in past 24 hr but wanted to not skip afternoon lactulose  - 1 unit PRBC transfusing at this time. H/H prior to was 7.1/20.9  - Additional doses of albumin ordered  - Rifaxamin added   - Urine sample planned for tomorrow AM  - US retroperitoneal ordered

## 2025-01-02 NOTE — SUBJECTIVE & OBJECTIVE
Interval History:   Kidney function stable, Na improved to 126 and stable.  UOP 1.2L/24h.      Review of patient's allergies indicates:   Allergen Reactions    Codeine Itching     Other reaction(s): Itching    Lipitor [atorvastatin] Other (See Comments)     Other reaction(s): Muscle pain  Muscle cranmps    Morphine Itching     Other reaction(s): nausea and vomiting     Zoloft [sertraline] Other (See Comments)     Tremors/muscle spasms     Current Facility-Administered Medications   Medication Frequency    0.9%  NaCl infusion (for blood administration) Q24H PRN    acetaminophen tablet 650 mg Q8H PRN    albumin human 25% bottle 25 g TID    bisacodyL suppository 10 mg Daily PRN    calcium carbonate 200 mg calcium (500 mg) chewable tablet 500 mg BID PRN    cefTRIAXone injection 1 g Daily    dextrose 50% injection 12.5 g PRN    dextrose 50% injection 25 g PRN    glucagon (human recombinant) injection 1 mg PRN    glucose chewable tablet 16 g PRN    glucose chewable tablet 24 g PRN    heparin (porcine) injection 5,000 Units Q8H    hydrocortisone 2.5 % cream BID PRN    lactulose 20 gram/30 mL solution Soln 10 g TID    levothyroxine tablet 75 mcg Daily    melatonin tablet 6 mg Nightly PRN    naloxone 0.4 mg/mL injection 0.02 mg PRN    ondansetron injection 4 mg Q6H PRN    oxyCODONE immediate release tablet 5 mg Q6H PRN    rifAXIMin tablet 550 mg BID    sodium chloride 0.9% flush 10 mL Q12H PRN    tacrolimus capsule 0.5 mg BID    venlafaxine 24 hr capsule 150 mg Daily       Objective:     Vital Signs (Most Recent):  Temp: 98.5 °F (36.9 °C) (01/02/25 0721)  Pulse: 77 (01/02/25 0721)  Resp: 16 (01/02/25 0950)  BP: (!) 117/56 (01/02/25 0721)  SpO2: 100 % (01/02/25 0721) Vital Signs (24h Range):  Temp:  [89.6 °F (32 °C)-98.6 °F (37 °C)] 98.5 °F (36.9 °C)  Pulse:  [77-85] 77  Resp:  [14-20] 16  SpO2:  [97 %-100 %] 100 %  BP: (117-154)/(56-68) 117/56     Weight: 56.8 kg (125 lb 1.8 oz) (12/30/24 0528)  Body mass index is 25.27  kg/m².  Body surface area is 1.54 meters squared.    I/O last 3 completed shifts:  In: 2367.1 [P.O.:1925; I.V.:442.1]  Out: 1825 [Urine:1825]     Physical Exam  Vitals and nursing note reviewed.   Constitutional:       General: She is not in acute distress.     Appearance: She is ill-appearing (Chronically ill-looking).   HENT:      Head: Normocephalic and atraumatic.      Mouth/Throat:      Mouth: Mucous membranes are moist.   Cardiovascular:      Rate and Rhythm: Normal rate and regular rhythm.   Pulmonary:      Effort: Pulmonary effort is normal. No respiratory distress.   Abdominal:      General: There is distension.      Tenderness: There is abdominal tenderness (mild).   Musculoskeletal:      Right lower leg: No edema.      Left lower leg: No edema.   Skin:     General: Skin is warm and dry.      Coloration: Skin is jaundiced (mild).   Neurological:      General: No focal deficit present.      Mental Status: She is alert and oriented to person, place, and time.   Psychiatric:         Mood and Affect: Mood normal.         Behavior: Behavior normal.          Significant Labs:  CBC:   Recent Labs   Lab 01/02/25  0812   WBC 3.13*   RBC 2.34*   HGB 7.1*   HCT 20.9*   PLT 53*   MCV 89   MCH 30.3   MCHC 34.0     CMP:   Recent Labs   Lab 01/02/25  0531   GLU 81   CALCIUM 9.4   ALBUMIN 3.6   PROT 6.0   *   K 4.9   CO2 19*   CL 99   BUN 39*   CREATININE 1.6*   ALKPHOS 212*   ALT 22   AST 41*   BILITOT 4.4*

## 2025-01-03 LAB
ALBUMIN SERPL BCP-MCNC: 4.1 G/DL (ref 3.5–5.2)
ALP SERPL-CCNC: 212 U/L (ref 40–150)
ALT SERPL W/O P-5'-P-CCNC: 20 U/L (ref 10–44)
ANION GAP SERPL CALC-SCNC: 6 MMOL/L (ref 8–16)
AST SERPL-CCNC: 42 U/L (ref 10–40)
BASOPHILS # BLD AUTO: 0.02 K/UL (ref 0–0.2)
BASOPHILS NFR BLD: 0.5 % (ref 0–1.9)
BILIRUB SERPL-MCNC: 5.9 MG/DL (ref 0.1–1)
BUN SERPL-MCNC: 40 MG/DL (ref 6–20)
CALCIUM SERPL-MCNC: 9.8 MG/DL (ref 8.7–10.5)
CHLORIDE SERPL-SCNC: 100 MMOL/L (ref 95–110)
CO2 SERPL-SCNC: 21 MMOL/L (ref 23–29)
CREAT SERPL-MCNC: 1.4 MG/DL (ref 0.5–1.4)
DIFFERENTIAL METHOD BLD: ABNORMAL
EOSINOPHIL # BLD AUTO: 0.2 K/UL (ref 0–0.5)
EOSINOPHIL NFR BLD: 4.4 % (ref 0–8)
ERYTHROCYTE [DISTWIDTH] IN BLOOD BY AUTOMATED COUNT: 14.9 % (ref 11.5–14.5)
EST. GFR  (NO RACE VARIABLE): 44.2 ML/MIN/1.73 M^2
GLUCOSE SERPL-MCNC: 77 MG/DL (ref 70–110)
HCT VFR BLD AUTO: 24.5 % (ref 37–48.5)
HGB BLD-MCNC: 8.1 G/DL (ref 12–16)
IMM GRANULOCYTES # BLD AUTO: 0.02 K/UL (ref 0–0.04)
IMM GRANULOCYTES NFR BLD AUTO: 0.5 % (ref 0–0.5)
LYMPHOCYTES # BLD AUTO: 0.5 K/UL (ref 1–4.8)
LYMPHOCYTES NFR BLD: 12 % (ref 18–48)
MAGNESIUM SERPL-MCNC: 2.6 MG/DL (ref 1.6–2.6)
MCH RBC QN AUTO: 29.8 PG (ref 27–31)
MCHC RBC AUTO-ENTMCNC: 33.1 G/DL (ref 32–36)
MCV RBC AUTO: 90 FL (ref 82–98)
MONOCYTES # BLD AUTO: 0.8 K/UL (ref 0.3–1)
MONOCYTES NFR BLD: 20.4 % (ref 4–15)
NEUTROPHILS # BLD AUTO: 2.4 K/UL (ref 1.8–7.7)
NEUTROPHILS NFR BLD: 62.2 % (ref 38–73)
NRBC BLD-RTO: 0 /100 WBC
OSMOLALITY UR: 367 MOSM/KG (ref 50–1200)
PHOSPHATE SERPL-MCNC: 2.8 MG/DL (ref 2.7–4.5)
PLATELET # BLD AUTO: 58 K/UL (ref 150–450)
PMV BLD AUTO: 11.4 FL (ref 9.2–12.9)
POTASSIUM SERPL-SCNC: 4.6 MMOL/L (ref 3.5–5.1)
PROT SERPL-MCNC: 6.5 G/DL (ref 6–8.4)
RBC # BLD AUTO: 2.72 M/UL (ref 4–5.4)
SODIUM SERPL-SCNC: 127 MMOL/L (ref 136–145)
SODIUM UR-SCNC: <10 MMOL/L (ref 20–250)
TACROLIMUS BLD-MCNC: 5.4 NG/ML (ref 5–15)
WBC # BLD AUTO: 3.83 K/UL (ref 3.9–12.7)

## 2025-01-03 PROCEDURE — 63600175 PHARM REV CODE 636 W HCPCS: Performed by: HOSPITALIST

## 2025-01-03 PROCEDURE — P9047 ALBUMIN (HUMAN), 25%, 50ML: HCPCS | Performed by: HOSPITALIST

## 2025-01-03 PROCEDURE — 83735 ASSAY OF MAGNESIUM: CPT

## 2025-01-03 PROCEDURE — 80053 COMPREHEN METABOLIC PANEL: CPT

## 2025-01-03 PROCEDURE — 20600001 HC STEP DOWN PRIVATE ROOM

## 2025-01-03 PROCEDURE — 83935 ASSAY OF URINE OSMOLALITY: CPT | Performed by: NURSE PRACTITIONER

## 2025-01-03 PROCEDURE — 85025 COMPLETE CBC W/AUTO DIFF WBC: CPT

## 2025-01-03 PROCEDURE — 25000003 PHARM REV CODE 250

## 2025-01-03 PROCEDURE — 84300 ASSAY OF URINE SODIUM: CPT | Performed by: NURSE PRACTITIONER

## 2025-01-03 PROCEDURE — 84100 ASSAY OF PHOSPHORUS: CPT

## 2025-01-03 PROCEDURE — 63600175 PHARM REV CODE 636 W HCPCS

## 2025-01-03 PROCEDURE — 99233 SBSQ HOSP IP/OBS HIGH 50: CPT | Mod: ,,, | Performed by: INTERNAL MEDICINE

## 2025-01-03 PROCEDURE — 36415 COLL VENOUS BLD VENIPUNCTURE: CPT

## 2025-01-03 PROCEDURE — 25000003 PHARM REV CODE 250: Performed by: HOSPITALIST

## 2025-01-03 PROCEDURE — 80197 ASSAY OF TACROLIMUS: CPT

## 2025-01-03 RX ORDER — OXYCODONE HYDROCHLORIDE 5 MG/1
5 TABLET ORAL EVERY 6 HOURS PRN
Status: DISCONTINUED | OUTPATIENT
Start: 2025-01-03 | End: 2025-01-06 | Stop reason: HOSPADM

## 2025-01-03 RX ADMIN — ALBUMIN (HUMAN) 25 G: 5 SOLUTION INTRAVENOUS at 08:01

## 2025-01-03 RX ADMIN — ONDANSETRON 4 MG: 2 INJECTION INTRAMUSCULAR; INTRAVENOUS at 12:01

## 2025-01-03 RX ADMIN — OXYCODONE 5 MG: 5 TABLET ORAL at 08:01

## 2025-01-03 RX ADMIN — RIFAXIMIN 550 MG: 550 TABLET ORAL at 08:01

## 2025-01-03 RX ADMIN — LACTULOSE 10 G: 20 SOLUTION ORAL at 08:01

## 2025-01-03 RX ADMIN — VENLAFAXINE HYDROCHLORIDE 150 MG: 75 CAPSULE, EXTENDED RELEASE ORAL at 08:01

## 2025-01-03 RX ADMIN — ONDANSETRON 4 MG: 2 INJECTION INTRAMUSCULAR; INTRAVENOUS at 01:01

## 2025-01-03 RX ADMIN — LEVOTHYROXINE SODIUM 75 MCG: 75 TABLET ORAL at 05:01

## 2025-01-03 RX ADMIN — CEFTRIAXONE 1 G: 1 INJECTION, POWDER, FOR SOLUTION INTRAMUSCULAR; INTRAVENOUS at 08:01

## 2025-01-03 RX ADMIN — TACROLIMUS 0.5 MG: 0.5 CAPSULE ORAL at 08:01

## 2025-01-03 RX ADMIN — ONDANSETRON 4 MG: 2 INJECTION INTRAMUSCULAR; INTRAVENOUS at 08:01

## 2025-01-03 RX ADMIN — HEPARIN SODIUM 5000 UNITS: 5000 INJECTION INTRAVENOUS; SUBCUTANEOUS at 02:01

## 2025-01-03 RX ADMIN — HEPARIN SODIUM 5000 UNITS: 5000 INJECTION INTRAVENOUS; SUBCUTANEOUS at 05:01

## 2025-01-03 RX ADMIN — TACROLIMUS 0.5 MG: 0.5 CAPSULE ORAL at 06:01

## 2025-01-03 NOTE — PROGRESS NOTES
[facility] - [facility] Medicine  Progress Note    Patient Name: Elda Hernandez  MRN: [id number]  Patient Class: IP- Inpatient   Admission Date: 12/29/2024  Length of Stay: 5 days  Attending Physician: Amber Guido*  Primary Care Provider: David Lorenzo MD        Subjective     Principal Problem:Hyponatremia        HPI:  Ms. 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 She presents to [facility] ED today with her spouse d/o hyponatremia, she gets routine lab, the recent lab shows sodium of 118 and she was told by her provider to come to the ED. Her chronic medications include Lasix, spironolactone, tacrolimus, levothyroxine, venlafaxine, ramipril, and pantoprazole. The patient reported that she has not been taking her antidiuretic and antihypertensive medications recently, except for Lasix, due to concerns about hyponatremia. She appears slightly confused, though this seems consistent with her baseline. She denies chest pain, fever, or chills.    In the ED, Afebrile, SBP slightly elevated (160), otherwise vitals were stable. Labs were remarkable for Na- 121, K-5.1, Tbil- 5.3, Alb- 2.4, ALP- 438, AST- 97, ALT- 49, PT- 14.4. Urine Osm pending.    Admit to hospital medicine for further work-up and management of hyponatremia.      Overview/Hospital Course:  Pt admitted to hospital medicine for further workup and management of hyponatremia likely associated with hypovolemia vs cirrhosis vs medication noncompliance vs SSRI use. IM6 consulted for diagnostic paracentesis but  no fluid pocket was noted on US. 500 cc NS bolus given and fluid restriction liberalized from 800 mL daily to 1.5L daily. Nephrology consulted for JACQUE and recommended mei catheter placement which pateint declined; also recommended holding diuretics in the setting of hyponatremia. Hepatology consulted for recommendations on management of tacrolimus who recommends continuing patient's home dose 0.5mg BID. Pt required 2 straight catheterizations since admission but declined mei. Repeat urine studies ordered after hyponatremia did not significantly improve, again not concerning for SIADH. Na improved with administration of albumin, fluid restriction discontinued. Albumin continuing for another 24 hours. 1 unit of pRBCs given as patient found to have symptomatic anemia on 1/2/25.    Interval History: Pt reporting abdominal pain well controlled with oxycodone 5mg q6h prn.     Review of Systems   Constitutional:  Positive for fatigue. Negative for fever.   HENT:  Negative for congestion.    Eyes: Negative.    Respiratory:  Positive for shortness of breath.    Cardiovascular:  Negative for leg swelling.   Gastrointestinal:  Positive for abdominal distention and abdominal pain. Negative for vomiting.   Endocrine: Negative.    Genitourinary: Negative.    Musculoskeletal: Negative.    Skin: Negative.    Allergic/Immunologic: Negative.    Neurological:  Positive for tremors.   Hematological: Negative.    Psychiatric/Behavioral: Negative.       Objective:     Vital Signs (Most Recent):  Temp: 98.2 °F (36.8 °C) (01/03/25 0745)  Pulse: 77 (01/03/25 0745)  Resp: 16 (01/03/25 0812)  BP: (!) 142/64 (01/03/25 0745)  SpO2: 97 % (01/03/25 0745) Vital Signs (24h Range):  Temp:  [98.2 °F (36.8 °C)-99.2 °F (37.3 °C)] 98.2 °F (36.8 °C)  Pulse:  [70-88] 77  Resp:  [16-19] 16  SpO2:  [96 %-98 %] 97 %  BP: (136-144)/(64-70) 142/64     Weight: 60.2 kg (132 lb 9.7 oz)  Body mass index is 26.78 kg/m².    Intake/Output Summary (Last 24 hours)  at 1/3/2025 0900  Last data filed at 1/3/2025 0757  Gross per 24 hour   Intake 1675 ml   Output 1350 ml   Net 325 ml         Physical Exam  Vitals and nursing note reviewed.   Constitutional:       General: She is not in acute distress.     Appearance: She is ill-appearing (Chronically ill-looking).   HENT:      Head: Normocephalic and atraumatic.      Mouth/Throat:      Mouth: Mucous membranes are moist.   Eyes:      General: Scleral icterus present.   Cardiovascular:      Rate and Rhythm: Normal rate and regular rhythm.   Pulmonary:      Effort: Pulmonary effort is normal. No respiratory distress.   Abdominal:      General: There is distension.      Tenderness: There is abdominal tenderness (mild).   Musculoskeletal:      Right lower leg: No edema.      Left lower leg: No edema.   Skin:     General: Skin is warm and dry.      Coloration: Skin is jaundiced (mild).   Neurological:      General: No focal deficit present.      Mental Status: She is alert and oriented to person, place, and time.      Comments: Asterixis present   Psychiatric:         Mood and Affect: Mood normal.         Behavior: Behavior normal.             Significant Labs: All pertinent labs within the past 24 hours have been reviewed.    Significant Imaging: I have reviewed all pertinent imaging results/findings within the past 24 hours.    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     The patient's most recent sodium results are listed below.  Recent Labs     01/01/25  1504 01/02/25  0595  01/03/25  0531   * 126* 127*       Plan  - Obtain the following studies: Urine sodium, urine osmolality, serum osmolality.   - Repeat urine studies ordered on 1/1/25 after no significant improvement in hyponatremia was noted -- no evidence of SIADH.  - Will treat the hyponatremia with IV fluids as needed   - 500cc one time bolus of NS given.  - Monitor sodium Daily.   - Fluid restriction discontinued.  - Nephrology consulted   - Albumin 25g TID course completed        Anemia  Anemia is likely due to chronic disease due to Chronic liver disease. Most recent hemoglobin and hematocrit are listed below.  Recent Labs     01/02/25  0531 01/02/25  0812 01/03/25  0531   HGB 6.7* 7.1* 8.1*   HCT 20.4* 20.9* 24.5*       Plan  - Monitor serial CBC: Daily  - Transfuse PRBC if patient becomes hemodynamically unstable, symptomatic or H/H drops below 7/21.  - Patient has received 1 units of PRBCs on 1/2/2025  - Patient's anemia is currently stable    JACQUE (acute kidney injury)  JACQUE is likely due to  hypovolemia vs hepatorenal syndrome . Urine output remains low and patient has required 2 straight catheterizations during this admission. Baseline creatinine is  1.0-1.1 . Most recent creatinine and eGFR are listed below.  Recent Labs     01/01/25  1504 01/02/25  0531 01/03/25  0531   CREATININE 1.4 1.6* 1.4   EGFRNORACEVR 44.2* 37.6* 44.2*        Plan  - JACQUE is worsening.  - Nephrology consulted for recommendations and evaluation for initiation of terlipressin   - Nephrology recommending against terlipressin.    - Recommend mei catheter for strict I and O monitoring -- patient declined but has been urinating well into [facility] for monitoring    - Albumin 25g TID course complete   - Renal US 1/3/25: Elevated intraparenchymal resistive indices which is nonspecific but may be seen with medical renal disease. Nonobstructing left renal stone. Mildly complicated right renal cyst.   - Avoid nephrotoxins and renally dose meds for GFR  listed above  - Monitor urine output, serial BMP, and adjust therapy as needed    Cirrhosis of liver with ascites  Patient with known history of cirrhosis.    MELD-Na score calculated; MELD 3.0: 28 at 12/31/2024  6:40 PM  MELD-Na: 27 at 12/31/2024  6:40 PM  Calculated from:  Serum Creatinine: 1.5 mg/dL at 12/31/2024  6:40 PM  Serum Sodium: 124 mmol/L (Using min of 125 mmol/L) at 12/31/2024  6:40 PM  Total Bilirubin: 5.2 mg/dL at 12/31/2024  6:09 AM  Serum Albumin: 2.8 g/dL at 12/31/2024  6:09 AM  INR(ratio): 1.3 at 12/29/2024  9:47 AM  Age at listing (hypothetical): 54 years  Sex: Female at 12/31/2024  6:40 PM      Plan:  -- Continue lactulose TID; titrate to 3-4 bowel movements per day.   -- Continue rifaximin.  -- Avoid any hepatotoxic meds  -- Monitor CBC/CMP/INR for synthetic function.  -- IM6 team consulted for possible diagnostic paracentesis; bedside US showed no available fluid pocket to drain; abdominal distension likely associated with gas. Suspicion for SBP at this time is low but CTX/albumin initiated in the setting of abdominal distention and pain.   -- Hepatology consulted - patient is not a candidate for retransplantation at this time since she is high risk given history of multiple surgeries after perforated gastric ulcer. Patient plans to seek a second opinion at [facility], [facility] or [facility].   -- Will restart diuretic regimen when appropriate.    Headache  -- PRN tylenol   -- Will consider migraine cocktail if needed      RUQ abdominal pain  Pt has had complaints of abdominal pain throughout hospital stay -- she received 2 doses of dilaudid and is now receiving spot doses of oxycodone 5mg prn. Although suspicion for SBP is initially low, patient remains distended and with mild tenderness in the abdomen. Will treat for SBP.    Plan:  -- Pain medication as appropriate  -- CTX 1g QD for 5 days - end date 1/4/2025.    Acquired hypothyroidism  -- Continue home Levothyroxine    S/P liver  transplant 9/23/2002 for von Gierke disease  known liver transplant in 9/2002.     Plan:  -- Hepatology consulted to assist in immunosuppressive medicine management; recommending continuing home tacrolimus 0.5mg BID  -- Daily tacrolimus level and CMP        VTE Risk Mitigation (From admission, onward)           Ordered     heparin (porcine) injection 5,000 Units  Every 8 hours         12/29/24 1331     Place sequential compression device  Until discontinued         12/29/24 1331     IP VTE LOW RISK PATIENT  Once         12/29/24 1331     Place sequential compression device  Until discontinued         12/29/24 1254                    Discharge Planning   MARILEE: 1/3/2025     Code Status: Full Code   Medical Readiness for Discharge Date:   Discharge Plan A: Home with family            Janet Casillas DO  Department of Hospital Medicine   [facility] - Transplant Stepdown

## 2025-01-03 NOTE — ASSESSMENT & PLAN NOTE
JACQUE is likely due to  hypovolemia vs hepatorenal syndrome . Urine output remains low and patient has required 2 straight catheterizations during this admission. Baseline creatinine is  1.0-1.1 . Most recent creatinine and eGFR are listed below.  Recent Labs     01/01/25  1504 01/02/25  0531 01/03/25  0531   CREATININE 1.4 1.6* 1.4   EGFRNORACEVR 44.2* 37.6* 44.2*        Plan  - JACQUE is worsening.  - Nephrology consulted for recommendations and evaluation for initiation of terlipressin   - Nephrology recommending against terlipressin.    - Recommend mei catheter for strict I and O monitoring -- patient declined but has been urinating well into hat for monitoring    - Albumin 25g TID course complete   - Renal US 1/3/25: Elevated intraparenchymal resistive indices which is nonspecific but may be seen with medical renal disease. Nonobstructing left renal stone. Mildly complicated right renal cyst.   - Avoid nephrotoxins and renally dose meds for GFR listed above  - Monitor urine output, serial BMP, and adjust therapy as needed

## 2025-01-03 NOTE — ASSESSMENT & PLAN NOTE
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     The patient's most recent sodium results are listed below.  Recent Labs     01/01/25  1504 01/02/25  0531 01/03/25  0531   * 126* 127*       Plan  - Obtain the following studies: Urine sodium, urine osmolality, serum osmolality.   - Repeat urine studies ordered on 1/1/25 after no significant improvement in hyponatremia was noted -- no evidence of SIADH.  - Will treat the hyponatremia with IV fluids as needed   - 500cc one time bolus of NS given.  - Monitor sodium Daily.   - Fluid restriction discontinued.  - Nephrology consulted   - Albumin 25g TID course completed

## 2025-01-03 NOTE — ASSESSMENT & PLAN NOTE
Acute on chronic  Meghna 13, SOSM 262  UOSM 208.    On home lasix; similar problem has happened back in August of this year.    Suspect hypovolemic hyponatremia but could also be related to her underlying liver disease.    Recommend holding lasix  Recommend dc'd Albumin, Albumin 4.1  Recommend 1.5 L fluid restriction

## 2025-01-03 NOTE — ASSESSMENT & PLAN NOTE
JACQUE on CKD 3a  Stable  SCr 1.6 @ time of consultation remains stable  Likely related to her ongoing urinary retention with underlying volume depletion  Meghna < 10  UOSM 367      sCr improved to 1.4 from 1.6. Other electrolytes stable. 24 hr UOP 1150 mL.   Low suspicious for HRS as MAPS have been ranged between   Recommend 1.5 L free water restriction

## 2025-01-03 NOTE — PROGRESS NOTES
[facility] - Transplant Stepdown  Nephrology  Progress Note    Patient Name: Elda Hernandez  MRN: [id number]  Admission Date: 12/29/2024  Hospital Length of Stay: 5 days  Attending Provider: Amber Guido*   Primary Care Physician: David Lorenzo MD  Principal Problem:Hyponatremia    Subjective:     Interval History:   Na remain stable today; Na 127 from 126. No distress on exam oxygenating well on RA. 24 hr UOP 1150 mL.     Review of patient's allergies indicates:   Allergen Reactions    Codeine Itching     Other reaction(s): Itching    Lipitor [atorvastatin] Other (See Comments)     Other reaction(s): Muscle pain  Muscle cranmps    Morphine Itching     Other reaction(s): nausea and vomiting     Zoloft [sertraline] Other (See Comments)     Tremors/muscle spasms     Current Facility-Administered Medications   Medication Frequency    acetaminophen tablet 650 mg Q8H PRN    albumin human 25% bottle 25 g TID    bisacodyL suppository 10 mg Daily PRN    calcium carbonate 200 mg calcium (500 mg) chewable tablet 500 mg BID PRN    cefTRIAXone injection 1 g Daily    dextrose 50% injection 12.5 g PRN    dextrose 50% injection 25 g PRN    glucagon (human recombinant) injection 1 mg PRN    glucose chewable tablet 16 g PRN    glucose chewable tablet 24 g PRN    heparin (porcine) injection 5,000 Units Q8H    hydrocortisone 2.5 % cream BID PRN    lactulose 20 gram/30 mL solution Soln 10 g TID    levothyroxine tablet 75 mcg Daily    melatonin tablet 6 mg Nightly PRN    naloxone 0.4 mg/mL injection 0.02 mg PRN    ondansetron injection 4 mg Q6H PRN    oxyCODONE immediate release tablet 5 mg Q6H PRN    rifAXIMin tablet 550 mg BID    sodium chloride 0.9% flush 10 mL Q12H PRN    tacrolimus capsule 0.5 mg BID    venlafaxine 24 hr capsule 150 mg Daily       Objective:     Vital Signs (Most Recent):  Temp: 98 °F (36.7 °C) (01/03/25 1518)  Pulse: 90 (01/03/25 1518)  Resp: 18 (01/03/25 1518)  BP: 137/62 (01/03/25 1518)  SpO2: 99 %  (01/03/25 7808) Vital Signs (24h Range):  Temp:  [98 °F (36.7 °C)-99.2 °F (37.3 °C)] 98 °F (36.7 °C)  Pulse:  [70-90] 90  Resp:  [16-19] 18  SpO2:  [96 %-99 %] 99 %  BP: (136-144)/(62-70) 137/62     Weight: 60.2 kg (132 lb 9.7 oz) (01/03/25 0758)  Body mass index is 26.78 kg/m².  Body surface area is 1.58 meters squared.    I/O last 3 completed shifts:  In: 2135 [P.O.:1485; I.V.:300; Blood:350]  Out: 1550 [Urine:1550]     Physical Exam  Vitals and nursing note reviewed.   Constitutional:       General: She is not in acute distress.     Appearance: She is ill-appearing (Chronically ill-looking).   HENT:      Head: Normocephalic and atraumatic.      Mouth/Throat:      Mouth: Mucous membranes are moist.   Cardiovascular:      Rate and Rhythm: Normal rate and regular rhythm.   Pulmonary:      Effort: Pulmonary effort is normal. No respiratory distress.   Abdominal:      General: There is distension.      Tenderness: There is abdominal tenderness (mild).   Musculoskeletal:      Right lower leg: No edema.      Left lower leg: No edema.   Skin:     General: Skin is warm and dry.      Coloration: Skin is jaundiced (mild).   Neurological:      General: No focal deficit present.      Mental Status: She is alert and oriented to person, place, and time.   Psychiatric:         Mood and Affect: Mood normal.         Behavior: Behavior normal.          Significant Labs:  CBC:   Recent Labs   Lab 01/03/25  0531   WBC 3.83*   RBC 2.72*   HGB 8.1*   HCT 24.5*   PLT 58*   MCV 90   MCH 29.8   MCHC 33.1     CMP:   Recent Labs   Lab 01/03/25  0531   GLU 77   CALCIUM 9.8   ALBUMIN 4.1   PROT 6.5   *   K 4.6   CO2 21*      BUN 40*   CREATININE 1.4   ALKPHOS 212*   ALT 20   AST 42*   BILITOT 5.9*     All labs within the past 24 hours have been reviewed.   Assessment/Plan:     Renal/  JACQUE (acute kidney injury)  JACQUE on CKD 3a  Stable  SCr 1.6 @ time of consultation remains stable  Likely related to her ongoing urinary retention  with underlying volume depletion  Meghna < 10  UOSM 367      sCr improved to 1.4 from 1.6. Other electrolytes stable. 24 hr UOP 1150 mL.   Low suspicious for HRS as MAPS have been ranged between   Recommend 1.5 L free water restriction     Endocrine  * Hyponatremia  Acute on chronic  Meghna 13, SOSM 262  UOSM 208.    On home lasix; similar problem has happened back in August of this year.    Suspect hypovolemic hyponatremia but could also be related to her underlying liver disease.    Recommend holding lasix  Recommend dc'd Albumin, Albumin 4.1  Recommend 1.5 L fluid restriction           Thank you for your consult. I will follow-up with patient. Please contact us if you have any additional questions.    Charity Bae DNP, FNP-C  Nephrology  [facility] - Transplant Stepdown

## 2025-01-03 NOTE — SUBJECTIVE & OBJECTIVE
Interval History:   Na remain stable today; Na 127 from 126. No distress on exam oxygenating well on RA. 24 hr UOP 1150 mL.     Review of patient's allergies indicates:   Allergen Reactions    Codeine Itching     Other reaction(s): Itching    Lipitor [atorvastatin] Other (See Comments)     Other reaction(s): Muscle pain  Muscle cranmps    Morphine Itching     Other reaction(s): nausea and vomiting     Zoloft [sertraline] Other (See Comments)     Tremors/muscle spasms     Current Facility-Administered Medications   Medication Frequency    acetaminophen tablet 650 mg Q8H PRN    albumin human 25% bottle 25 g TID    bisacodyL suppository 10 mg Daily PRN    calcium carbonate 200 mg calcium (500 mg) chewable tablet 500 mg BID PRN    cefTRIAXone injection 1 g Daily    dextrose 50% injection 12.5 g PRN    dextrose 50% injection 25 g PRN    glucagon (human recombinant) injection 1 mg PRN    glucose chewable tablet 16 g PRN    glucose chewable tablet 24 g PRN    heparin (porcine) injection 5,000 Units Q8H    hydrocortisone 2.5 % cream BID PRN    lactulose 20 gram/30 mL solution Soln 10 g TID    levothyroxine tablet 75 mcg Daily    melatonin tablet 6 mg Nightly PRN    naloxone 0.4 mg/mL injection 0.02 mg PRN    ondansetron injection 4 mg Q6H PRN    oxyCODONE immediate release tablet 5 mg Q6H PRN    rifAXIMin tablet 550 mg BID    sodium chloride 0.9% flush 10 mL Q12H PRN    tacrolimus capsule 0.5 mg BID    venlafaxine 24 hr capsule 150 mg Daily       Objective:     Vital Signs (Most Recent):  Temp: 98 °F (36.7 °C) (01/03/25 1518)  Pulse: 90 (01/03/25 1518)  Resp: 18 (01/03/25 1518)  BP: 137/62 (01/03/25 1518)  SpO2: 99 % (01/03/25 1518) Vital Signs (24h Range):  Temp:  [98 °F (36.7 °C)-99.2 °F (37.3 °C)] 98 °F (36.7 °C)  Pulse:  [70-90] 90  Resp:  [16-19] 18  SpO2:  [96 %-99 %] 99 %  BP: (136-144)/(62-70) 137/62     Weight: 60.2 kg (132 lb 9.7 oz) (01/03/25 0745)  Body mass index is 26.78 kg/m².  Body surface area is 1.58  meters squared.    I/O last 3 completed shifts:  In: 2135 [P.O.:1485; I.V.:300; Blood:350]  Out: 1550 [Urine:1550]     Physical Exam  Vitals and nursing note reviewed.   Constitutional:       General: She is not in acute distress.     Appearance: She is ill-appearing (Chronically ill-looking).   HENT:      Head: Normocephalic and atraumatic.      Mouth/Throat:      Mouth: Mucous membranes are moist.   Cardiovascular:      Rate and Rhythm: Normal rate and regular rhythm.   Pulmonary:      Effort: Pulmonary effort is normal. No respiratory distress.   Abdominal:      General: There is distension.      Tenderness: There is abdominal tenderness (mild).   Musculoskeletal:      Right lower leg: No edema.      Left lower leg: No edema.   Skin:     General: Skin is warm and dry.      Coloration: Skin is jaundiced (mild).   Neurological:      General: No focal deficit present.      Mental Status: She is alert and oriented to person, place, and time.   Psychiatric:         Mood and Affect: Mood normal.         Behavior: Behavior normal.          Significant Labs:  CBC:   Recent Labs   Lab 01/03/25  0531   WBC 3.83*   RBC 2.72*   HGB 8.1*   HCT 24.5*   PLT 58*   MCV 90   MCH 29.8   MCHC 33.1     CMP:   Recent Labs   Lab 01/03/25  0531   GLU 77   CALCIUM 9.8   ALBUMIN 4.1   PROT 6.5   *   K 4.6   CO2 21*      BUN 40*   CREATININE 1.4   ALKPHOS 212*   ALT 20   AST 42*   BILITOT 5.9*     All labs within the past 24 hours have been reviewed.

## 2025-01-03 NOTE — PLAN OF CARE
Alert and oriented. VSS. Albumin x1 given. PRN oxy given x1. Pt abdomen distended. Plan for urine sodium this morning. Pt able to void > 700 ml overnight. Ambulating independently. Call bell in reach. NSR on telemetry.

## 2025-01-03 NOTE — ASSESSMENT & PLAN NOTE
Anemia is likely due to chronic disease due to Chronic liver disease. Most recent hemoglobin and hematocrit are listed below.  Recent Labs     01/02/25  0531 01/02/25  0812 01/03/25  0531   HGB 6.7* 7.1* 8.1*   HCT 20.4* 20.9* 24.5*       Plan  - Monitor serial CBC: Daily  - Transfuse PRBC if patient becomes hemodynamically unstable, symptomatic or H/H drops below 7/21.  - Patient has received 1 units of PRBCs on 1/2/2025  - Patient's anemia is currently stable

## 2025-01-03 NOTE — SUBJECTIVE & OBJECTIVE
Interval History: Pt reporting abdominal pain well controlled with oxycodone 5mg q6h prn.     Review of Systems   Constitutional:  Positive for fatigue. Negative for fever.   HENT:  Negative for congestion.    Eyes: Negative.    Respiratory:  Positive for shortness of breath.    Cardiovascular:  Negative for leg swelling.   Gastrointestinal:  Positive for abdominal distention and abdominal pain. Negative for vomiting.   Endocrine: Negative.    Genitourinary: Negative.    Musculoskeletal: Negative.    Skin: Negative.    Allergic/Immunologic: Negative.    Neurological:  Positive for tremors.   Hematological: Negative.    Psychiatric/Behavioral: Negative.       Objective:     Vital Signs (Most Recent):  Temp: 98.2 °F (36.8 °C) (01/03/25 0745)  Pulse: 77 (01/03/25 0745)  Resp: 16 (01/03/25 0812)  BP: (!) 142/64 (01/03/25 0745)  SpO2: 97 % (01/03/25 0745) Vital Signs (24h Range):  Temp:  [98.2 °F (36.8 °C)-99.2 °F (37.3 °C)] 98.2 °F (36.8 °C)  Pulse:  [70-88] 77  Resp:  [16-19] 16  SpO2:  [96 %-98 %] 97 %  BP: (136-144)/(64-70) 142/64     Weight: 60.2 kg (132 lb 9.7 oz)  Body mass index is 26.78 kg/m².    Intake/Output Summary (Last 24 hours) at 1/3/2025 0900  Last data filed at 1/3/2025 0757  Gross per 24 hour   Intake 1675 ml   Output 1350 ml   Net 325 ml         Physical Exam  Vitals and nursing note reviewed.   Constitutional:       General: She is not in acute distress.     Appearance: She is ill-appearing (Chronically ill-looking).   HENT:      Head: Normocephalic and atraumatic.      Mouth/Throat:      Mouth: Mucous membranes are moist.   Eyes:      General: Scleral icterus present.   Cardiovascular:      Rate and Rhythm: Normal rate and regular rhythm.   Pulmonary:      Effort: Pulmonary effort is normal. No respiratory distress.   Abdominal:      General: There is distension.      Tenderness: There is abdominal tenderness (mild).   Musculoskeletal:      Right lower leg: No edema.      Left lower leg: No edema.    Skin:     General: Skin is warm and dry.      Coloration: Skin is jaundiced (mild).   Neurological:      General: No focal deficit present.      Mental Status: She is alert and oriented to person, place, and time.      Comments: Asterixis present   Psychiatric:         Mood and Affect: Mood normal.         Behavior: Behavior normal.             Significant Labs: All pertinent labs within the past 24 hours have been reviewed.    Significant Imaging: I have reviewed all pertinent imaging results/findings within the past 24 hours.

## 2025-01-03 NOTE — ASSESSMENT & PLAN NOTE
Pt has had complaints of abdominal pain throughout hospital stay -- she received 2 doses of dilaudid and is now receiving spot doses of oxycodone 5mg prn. Although suspicion for SBP is initially low, patient remains distended and with mild tenderness in the abdomen. Will treat for SBP.    Plan:  -- Pain medication as appropriate  -- CTX 1g QD for 5 days - end date 1/4/2025.

## 2025-01-03 NOTE — ASSESSMENT & PLAN NOTE
Patient with known history of cirrhosis.    MELD-Na score calculated; MELD 3.0: 28 at 12/31/2024  6:40 PM  MELD-Na: 27 at 12/31/2024  6:40 PM  Calculated from:  Serum Creatinine: 1.5 mg/dL at 12/31/2024  6:40 PM  Serum Sodium: 124 mmol/L (Using min of 125 mmol/L) at 12/31/2024  6:40 PM  Total Bilirubin: 5.2 mg/dL at 12/31/2024  6:09 AM  Serum Albumin: 2.8 g/dL at 12/31/2024  6:09 AM  INR(ratio): 1.3 at 12/29/2024  9:47 AM  Age at listing (hypothetical): 54 years  Sex: Female at 12/31/2024  6:40 PM      Plan:  -- Continue lactulose TID; titrate to 3-4 bowel movements per day.   -- Continue rifaximin.  -- Avoid any hepatotoxic meds  -- Monitor CBC/CMP/INR for synthetic function.  -- IM6 team consulted for possible diagnostic paracentesis; bedside US showed no available fluid pocket to drain; abdominal distension likely associated with gas. Suspicion for SBP at this time is low but CTX/albumin initiated in the setting of abdominal distention and pain.   -- Hepatology consulted - patient is not a candidate for retransplantation at this time since she is high risk given history of multiple surgeries after perforated gastric ulcer. Patient plans to seek a second opinion at [facility], [facility] or [facility].   -- Will restart diuretic regimen when appropriate.

## 2025-01-04 LAB
ALBUMIN SERPL BCP-MCNC: 4 G/DL (ref 3.5–5.2)
ALP SERPL-CCNC: 232 U/L (ref 40–150)
ALT SERPL W/O P-5'-P-CCNC: 21 U/L (ref 10–44)
ANION GAP SERPL CALC-SCNC: 8 MMOL/L (ref 8–16)
AST SERPL-CCNC: 44 U/L (ref 10–40)
BASOPHILS # BLD AUTO: 0.03 K/UL (ref 0–0.2)
BASOPHILS NFR BLD: 0.8 % (ref 0–1.9)
BILIRUB SERPL-MCNC: 5.8 MG/DL (ref 0.1–1)
BUN SERPL-MCNC: 36 MG/DL (ref 6–20)
CALCIUM SERPL-MCNC: 9.7 MG/DL (ref 8.7–10.5)
CHLORIDE SERPL-SCNC: 102 MMOL/L (ref 95–110)
CO2 SERPL-SCNC: 19 MMOL/L (ref 23–29)
CREAT SERPL-MCNC: 1.2 MG/DL (ref 0.5–1.4)
DIFFERENTIAL METHOD BLD: ABNORMAL
EOSINOPHIL # BLD AUTO: 0.2 K/UL (ref 0–0.5)
EOSINOPHIL NFR BLD: 4.4 % (ref 0–8)
ERYTHROCYTE [DISTWIDTH] IN BLOOD BY AUTOMATED COUNT: 15.2 % (ref 11.5–14.5)
EST. GFR  (NO RACE VARIABLE): 53.1 ML/MIN/1.73 M^2
GLUCOSE SERPL-MCNC: 80 MG/DL (ref 70–110)
HCT VFR BLD AUTO: 23.3 % (ref 37–48.5)
HGB BLD-MCNC: 7.8 G/DL (ref 12–16)
IMM GRANULOCYTES # BLD AUTO: 0.01 K/UL (ref 0–0.04)
IMM GRANULOCYTES NFR BLD AUTO: 0.3 % (ref 0–0.5)
LYMPHOCYTES # BLD AUTO: 0.5 K/UL (ref 1–4.8)
LYMPHOCYTES NFR BLD: 12.3 % (ref 18–48)
MAGNESIUM SERPL-MCNC: 2.4 MG/DL (ref 1.6–2.6)
MCH RBC QN AUTO: 30.2 PG (ref 27–31)
MCHC RBC AUTO-ENTMCNC: 33.5 G/DL (ref 32–36)
MCV RBC AUTO: 90 FL (ref 82–98)
MONOCYTES # BLD AUTO: 0.7 K/UL (ref 0.3–1)
MONOCYTES NFR BLD: 18.5 % (ref 4–15)
NEUTROPHILS # BLD AUTO: 2.5 K/UL (ref 1.8–7.7)
NEUTROPHILS NFR BLD: 63.7 % (ref 38–73)
NRBC BLD-RTO: 0 /100 WBC
PHOSPHATE SERPL-MCNC: 2.5 MG/DL (ref 2.7–4.5)
PLATELET # BLD AUTO: 58 K/UL (ref 150–450)
PMV BLD AUTO: 10.9 FL (ref 9.2–12.9)
POTASSIUM SERPL-SCNC: 4.2 MMOL/L (ref 3.5–5.1)
PROT SERPL-MCNC: 6.5 G/DL (ref 6–8.4)
RBC # BLD AUTO: 2.58 M/UL (ref 4–5.4)
SODIUM SERPL-SCNC: 129 MMOL/L (ref 136–145)
TACROLIMUS BLD-MCNC: 5.5 NG/ML (ref 5–15)
WBC # BLD AUTO: 3.89 K/UL (ref 3.9–12.7)

## 2025-01-04 PROCEDURE — 80197 ASSAY OF TACROLIMUS: CPT

## 2025-01-04 PROCEDURE — 36415 COLL VENOUS BLD VENIPUNCTURE: CPT

## 2025-01-04 PROCEDURE — 25000003 PHARM REV CODE 250: Performed by: HOSPITALIST

## 2025-01-04 PROCEDURE — 83735 ASSAY OF MAGNESIUM: CPT

## 2025-01-04 PROCEDURE — 20600001 HC STEP DOWN PRIVATE ROOM

## 2025-01-04 PROCEDURE — 63600175 PHARM REV CODE 636 W HCPCS

## 2025-01-04 PROCEDURE — 84100 ASSAY OF PHOSPHORUS: CPT

## 2025-01-04 PROCEDURE — 85025 COMPLETE CBC W/AUTO DIFF WBC: CPT

## 2025-01-04 PROCEDURE — 63600175 PHARM REV CODE 636 W HCPCS: Performed by: HOSPITALIST

## 2025-01-04 PROCEDURE — 25000003 PHARM REV CODE 250

## 2025-01-04 PROCEDURE — 80053 COMPREHEN METABOLIC PANEL: CPT

## 2025-01-04 RX ADMIN — RIFAXIMIN 550 MG: 550 TABLET ORAL at 07:01

## 2025-01-04 RX ADMIN — VENLAFAXINE HYDROCHLORIDE 150 MG: 75 CAPSULE, EXTENDED RELEASE ORAL at 09:01

## 2025-01-04 RX ADMIN — LEVOTHYROXINE SODIUM 75 MCG: 75 TABLET ORAL at 05:01

## 2025-01-04 RX ADMIN — HEPARIN SODIUM 5000 UNITS: 5000 INJECTION INTRAVENOUS; SUBCUTANEOUS at 10:01

## 2025-01-04 RX ADMIN — ONDANSETRON 4 MG: 2 INJECTION INTRAMUSCULAR; INTRAVENOUS at 06:01

## 2025-01-04 RX ADMIN — LACTULOSE 10 G: 20 SOLUTION ORAL at 02:01

## 2025-01-04 RX ADMIN — ONDANSETRON 4 MG: 2 INJECTION INTRAMUSCULAR; INTRAVENOUS at 11:01

## 2025-01-04 RX ADMIN — HEPARIN SODIUM 5000 UNITS: 5000 INJECTION INTRAVENOUS; SUBCUTANEOUS at 05:01

## 2025-01-04 RX ADMIN — ONDANSETRON 4 MG: 2 INJECTION INTRAMUSCULAR; INTRAVENOUS at 03:01

## 2025-01-04 RX ADMIN — OXYCODONE 5 MG: 5 TABLET ORAL at 11:01

## 2025-01-04 RX ADMIN — TACROLIMUS 0.5 MG: 0.5 CAPSULE ORAL at 05:01

## 2025-01-04 RX ADMIN — OXYCODONE 5 MG: 5 TABLET ORAL at 03:01

## 2025-01-04 RX ADMIN — RIFAXIMIN 550 MG: 550 TABLET ORAL at 09:01

## 2025-01-04 RX ADMIN — OXYCODONE 5 MG: 5 TABLET ORAL at 06:01

## 2025-01-04 RX ADMIN — LACTULOSE 10 G: 20 SOLUTION ORAL at 07:01

## 2025-01-04 RX ADMIN — TACROLIMUS 0.5 MG: 0.5 CAPSULE ORAL at 09:01

## 2025-01-04 RX ADMIN — CEFTRIAXONE 1 G: 1 INJECTION, POWDER, FOR SOLUTION INTRAMUSCULAR; INTRAVENOUS at 09:01

## 2025-01-04 RX ADMIN — LACTULOSE 10 G: 20 SOLUTION ORAL at 09:01

## 2025-01-04 RX ADMIN — HEPARIN SODIUM 5000 UNITS: 5000 INJECTION INTRAVENOUS; SUBCUTANEOUS at 02:01

## 2025-01-04 NOTE — ASSESSMENT & PLAN NOTE
JACQUE is likely due to  hypovolemia vs hepatorenal syndrome . Urine output remains low and patient has required 2 straight catheterizations during this admission. Baseline creatinine is  1.0-1.1 . Most recent creatinine and eGFR are listed below.  Recent Labs     01/02/25  0531 01/03/25  0531 01/04/25  0545   CREATININE 1.6* 1.4 1.2   EGFRNORACEVR 37.6* 44.2* 53.1*        Plan  - JACQUE is worsening.  - Nephrology consulted for recommendations and evaluation for initiation of terlipressin   - Nephrology recommending against terlipressin.    - Recommend mei catheter for strict I and O monitoring -- patient declined but has been urinating well into hat for monitoring    - Albumin 25g TID course complete   - Renal US 1/3/25: Elevated intraparenchymal resistive indices which is nonspecific but may be seen with medical renal disease. Nonobstructing left renal stone. Mildly complicated right renal cyst.   - Avoid nephrotoxins and renally dose meds for GFR listed above  - Monitor urine output, serial BMP, and adjust therapy as needed

## 2025-01-04 NOTE — ASSESSMENT & PLAN NOTE
Anemia is likely due to chronic disease due to Chronic liver disease. Most recent hemoglobin and hematocrit are listed below.  Recent Labs     01/02/25  0812 01/03/25  0531 01/04/25  0545   HGB 7.1* 8.1* 7.8*   HCT 20.9* 24.5* 23.3*       Plan  - Monitor serial CBC: Daily  - Transfuse PRBC if patient becomes hemodynamically unstable, symptomatic or H/H drops below 7/21.  - Patient has received 1 units of PRBCs on 1/2/2025  - Patient's anemia is currently stable

## 2025-01-04 NOTE — PLAN OF CARE
Patient AAOx4. OOB independently. VSS. 1500 ml FR. Continued Lactulose 10g TID PO. BM x2 this shift. Na+ 129, slightly up from previous. Bed lowest setting, locked, 2x rails up, call light within reach, pt verbalized use.

## 2025-01-04 NOTE — ASSESSMENT & PLAN NOTE
Patient with known history of cirrhosis.    MELD-Na score calculated; MELD 3.0: 28 at 12/31/2024  6:40 PM  MELD-Na: 27 at 12/31/2024  6:40 PM  Calculated from:  Serum Creatinine: 1.5 mg/dL at 12/31/2024  6:40 PM  Serum Sodium: 124 mmol/L (Using min of 125 mmol/L) at 12/31/2024  6:40 PM  Total Bilirubin: 5.2 mg/dL at 12/31/2024  6:09 AM  Serum Albumin: 2.8 g/dL at 12/31/2024  6:09 AM  INR(ratio): 1.3 at 12/29/2024  9:47 AM  Age at listing (hypothetical): 54 years  Sex: Female at 12/31/2024  6:40 PM      Plan:  -- Continue lactulose TID; titrate to 3-4 bowel movements per day.   -- Continue rifaximin.  -- Avoid any hepatotoxic meds  -- Monitor CBC/CMP/INR for synthetic function.  -- IM6 team consulted for possible diagnostic paracentesis; bedside US showed no available fluid pocket to drain; abdominal distension likely associated with gas. Suspicion for SBP at this time is low but CTX/albumin initiated in the setting of abdominal distention and pain.   -- Hepatology consulted - patient is not a candidate for retransplantation at this time since she is high risk given history of multiple surgeries after perforated gastric ulcer. Patient plans to seek a second opinion at [facility], [facility] or [facility].   -- Will restart diuretic regimen when appropriate.

## 2025-01-04 NOTE — ASSESSMENT & PLAN NOTE
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     The patient's most recent sodium results are listed below.  Recent Labs     01/02/25  0531 01/03/25  0531 01/04/25  0545   * 127* 129*       Plan  - Obtain the following studies: Urine sodium, urine osmolality, serum osmolality.   - Repeat urine studies ordered on 1/1/25 after no significant improvement in hyponatremia was noted -- no evidence of SIADH.  - Will treat the hyponatremia with IV fluids as needed   - 500cc one time bolus of NS given.  - Monitor sodium Daily.   - Fluid restriction of 1.5L  - Nephrology consulted   - Albumin 25g TID course completed

## 2025-01-04 NOTE — SUBJECTIVE & OBJECTIVE
Interval History: Pt reporting abdominal pain well controlled with oxycodone 5mg q6h prn.     Review of Systems   Constitutional:  Positive for fatigue. Negative for fever.   HENT:  Negative for congestion.    Eyes: Negative.    Respiratory:  Positive for shortness of breath.    Cardiovascular:  Negative for leg swelling.   Gastrointestinal:  Positive for abdominal distention and abdominal pain. Negative for vomiting.   Endocrine: Negative.    Genitourinary: Negative.    Musculoskeletal: Negative.    Skin: Negative.    Allergic/Immunologic: Negative.    Neurological:  Positive for tremors.   Hematological: Negative.    Psychiatric/Behavioral: Negative.       Objective:     Vital Signs (Most Recent):  Temp: 99 °F (37.2 °C) (01/04/25 1138)  Pulse: 85 (01/04/25 1138)  Resp: 18 (01/04/25 1148)  BP: (!) 174/77 (01/04/25 1138)  SpO2: 96 % (01/04/25 1138) Vital Signs (24h Range):  Temp:  [98 °F (36.7 °C)-99.4 °F (37.4 °C)] 99 °F (37.2 °C)  Pulse:  [76-90] 85  Resp:  [16-18] 18  SpO2:  [95 %-99 %] 96 %  BP: (132-174)/(61-77) 174/77     Weight: 60.2 kg (132 lb 9.7 oz)  Body mass index is 26.78 kg/m².    Intake/Output Summary (Last 24 hours) at 1/4/2025 1321  Last data filed at 1/4/2025 0550  Gross per 24 hour   Intake 1155 ml   Output 1051 ml   Net 104 ml         Physical Exam  Vitals and nursing note reviewed.   Constitutional:       General: She is not in acute distress.     Appearance: She is ill-appearing (Chronically ill-looking).   HENT:      Head: Normocephalic and atraumatic.      Mouth/Throat:      Mouth: Mucous membranes are moist.   Eyes:      General: Scleral icterus present.   Cardiovascular:      Rate and Rhythm: Normal rate and regular rhythm.   Pulmonary:      Effort: Pulmonary effort is normal. No respiratory distress.   Abdominal:      General: There is distension.      Tenderness: There is abdominal tenderness (mild).   Musculoskeletal:      Right lower leg: No edema.      Left lower leg: No edema.    Skin:     General: Skin is warm and dry.      Coloration: Skin is jaundiced (mild).   Neurological:      General: No focal deficit present.      Mental Status: She is alert and oriented to person, place, and time.      Comments: Asterixis present   Psychiatric:         Mood and Affect: Mood normal.         Behavior: Behavior normal.             Significant Labs: All pertinent labs within the past 24 hours have been reviewed.    Significant Imaging: I have reviewed all pertinent imaging results/findings within the past 24 hours.

## 2025-01-04 NOTE — PROGRESS NOTES
[facility] - Transplant [facility] Medicine  Progress Note    Patient Name: Elda Hernandez  MRN: [id number]  Patient Class: IP- Inpatient   Admission Date: 12/29/2024  Length of Stay: 6 days  Attending Physician: Amber Guido*  Primary Care Provider: David Lorenzo MD        Subjective     Principal Problem:Hyponatremia        HPI:  Ms. 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 She presents to [facility] ED today with her spouse d/o hyponatremia, she gets routine lab, the recent lab shows sodium of 118 and she was told by her provider to come to the ED. Her chronic medications include Lasix, spironolactone, tacrolimus, levothyroxine, venlafaxine, ramipril, and pantoprazole. The patient reported that she has not been taking her antidiuretic and antihypertensive medications recently, except for Lasix, due to concerns about hyponatremia. She appears slightly confused, though this seems consistent with her baseline. She denies chest pain, fever, or chills.    In the ED, Afebrile, SBP slightly elevated (160), otherwise vitals were stable. Labs were remarkable for Na- 121, K-5.1, Tbil- 5.3, Alb- 2.4, ALP- 438, AST- 97, ALT- 49, PT- 14.4. Urine Osm pending.    Admit to hospital medicine for further work-up and management of hyponatremia.      Overview/Hospital Course:  Pt admitted to hospital medicine for further workup and management of hyponatremia likely associated with hypovolemia vs cirrhosis vs medication noncompliance vs SSRI use. IM6 consulted for diagnostic paracentesis but  no fluid pocket was noted on US. 500 cc NS bolus given and fluid restriction liberalized from 800 mL daily to 1.5L daily. Nephrology consulted for JACQUE and recommended mei catheter placement which pateint declined; also recommended holding diuretics in the setting of hyponatremia. Hepatology consulted for recommendations on management of tacrolimus who recommends continuing patient's home dose 0.5mg BID. Pt required 2 straight catheterizations since admission but declined mei. Repeat urine studies ordered after hyponatremia did not significantly improve, again not concerning for SIADH. Na improving with administration of albumin, fluid restriction discontinued. 1 unit of pRBCs given as patient found to have symptomatic anemia on 1/2/25. Albumin course completed.     Interval History: Pt reporting abdominal pain well controlled with oxycodone 5mg q6h prn.     Review of Systems   Constitutional:  Positive for fatigue. Negative for fever.   HENT:  Negative for congestion.    Eyes: Negative.    Respiratory:  Positive for shortness of breath.    Cardiovascular:  Negative for leg swelling.   Gastrointestinal:  Positive for abdominal distention and abdominal pain. Negative for vomiting.   Endocrine: Negative.    Genitourinary: Negative.    Musculoskeletal: Negative.    Skin: Negative.    Allergic/Immunologic: Negative.    Neurological:  Positive for tremors.   Hematological: Negative.    Psychiatric/Behavioral: Negative.       Objective:     Vital Signs (Most Recent):  Temp: 99 °F (37.2 °C) (01/04/25 1138)  Pulse: 85 (01/04/25 1138)  Resp: 18 (01/04/25 1148)  BP: (!) 174/77 (01/04/25 1138)  SpO2: 96 % (01/04/25 1138) Vital Signs (24h Range):  Temp:  [98 °F (36.7 °C)-99.4 °F (37.4 °C)] 99 °F (37.2 °C)  Pulse:  [76-90] 85  Resp:  [16-18] 18  SpO2:  [95 %-99 %] 96 %  BP: (132-174)/(61-77) 174/77     Weight: 60.2 kg (132 lb 9.7 oz)  Body mass index is 26.78 kg/m².    Intake/Output Summary (Last 24 hours) at 1/4/2025  1321  Last data filed at 1/4/2025 0550  Gross per 24 hour   Intake 1155 ml   Output 1051 ml   Net 104 ml         Physical Exam  Vitals and nursing note reviewed.   Constitutional:       General: She is not in acute distress.     Appearance: She is ill-appearing (Chronically ill-looking).   HENT:      Head: Normocephalic and atraumatic.      Mouth/Throat:      Mouth: Mucous membranes are moist.   Eyes:      General: Scleral icterus present.   Cardiovascular:      Rate and Rhythm: Normal rate and regular rhythm.   Pulmonary:      Effort: Pulmonary effort is normal. No respiratory distress.   Abdominal:      General: There is distension.      Tenderness: There is abdominal tenderness (mild).   Musculoskeletal:      Right lower leg: No edema.      Left lower leg: No edema.   Skin:     General: Skin is warm and dry.      Coloration: Skin is jaundiced (mild).   Neurological:      General: No focal deficit present.      Mental Status: She is alert and oriented to person, place, and time.      Comments: Asterixis present   Psychiatric:         Mood and Affect: Mood normal.         Behavior: Behavior normal.             Significant Labs: All pertinent labs within the past 24 hours have been reviewed.    Significant Imaging: I have reviewed all pertinent imaging results/findings within the past 24 hours.    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     The patient's most recent sodium results are listed below.  Recent Labs     01/02/25  0531 01/03/25  0531 01/04/25  0545    * 127* 129*       Plan  - Obtain the following studies: Urine sodium, urine osmolality, serum osmolality.   - Repeat urine studies ordered on 1/1/25 after no significant improvement in hyponatremia was noted -- no evidence of SIADH.  - Will treat the hyponatremia with IV fluids as needed   - 500cc one time bolus of NS given.  - Monitor sodium Daily.   - Fluid restriction of 1.5L  - Nephrology consulted   - Albumin 25g TID course completed          Anemia  Anemia is likely due to chronic disease due to Chronic liver disease. Most recent hemoglobin and hematocrit are listed below.  Recent Labs     01/02/25  0812 01/03/25  0531 01/04/25  0545   HGB 7.1* 8.1* 7.8*   HCT 20.9* 24.5* 23.3*       Plan  - Monitor serial CBC: Daily  - Transfuse PRBC if patient becomes hemodynamically unstable, symptomatic or H/H drops below 7/21.  - Patient has received 1 units of PRBCs on 1/2/2025  - Patient's anemia is currently stable    JACQUE (acute kidney injury)  JACQUE is likely due to  hypovolemia vs hepatorenal syndrome . Urine output remains low and patient has required 2 straight catheterizations during this admission. Baseline creatinine is  1.0-1.1 . Most recent creatinine and eGFR are listed below.  Recent Labs     01/02/25  0531 01/03/25  0531 01/04/25  0545   CREATININE 1.6* 1.4 1.2   EGFRNORACEVR 37.6* 44.2* 53.1*        Plan  - JACQUE is worsening.  - Nephrology consulted for recommendations and evaluation for initiation of terlipressin   - Nephrology recommending against terlipressin.    - Recommend mei catheter for strict I and O monitoring -- patient declined but has been urinating well into [facility] for monitoring    - Albumin 25g TID course complete   - Renal US 1/3/25: Elevated intraparenchymal resistive indices which is nonspecific but may be seen with medical renal disease. Nonobstructing left renal stone. Mildly complicated right renal cyst.   - Avoid nephrotoxins and renally dose meds for GFR listed above  - Monitor  urine output, serial BMP, and adjust therapy as needed    Cirrhosis of liver with ascites  Patient with known history of cirrhosis.    MELD-Na score calculated; MELD 3.0: 28 at 12/31/2024  6:40 PM  MELD-Na: 27 at 12/31/2024  6:40 PM  Calculated from:  Serum Creatinine: 1.5 mg/dL at 12/31/2024  6:40 PM  Serum Sodium: 124 mmol/L (Using min of 125 mmol/L) at 12/31/2024  6:40 PM  Total Bilirubin: 5.2 mg/dL at 12/31/2024  6:09 AM  Serum Albumin: 2.8 g/dL at 12/31/2024  6:09 AM  INR(ratio): 1.3 at 12/29/2024  9:47 AM  Age at listing (hypothetical): 54 years  Sex: Female at 12/31/2024  6:40 PM      Plan:  -- Continue lactulose TID; titrate to 3-4 bowel movements per day.   -- Continue rifaximin.  -- Avoid any hepatotoxic meds  -- Monitor CBC/CMP/INR for synthetic function.  -- IM6 team consulted for possible diagnostic paracentesis; bedside US showed no available fluid pocket to drain; abdominal distension likely associated with gas. Suspicion for SBP at this time is low but CTX/albumin initiated in the setting of abdominal distention and pain.   -- Hepatology consulted - patient is not a candidate for retransplantation at this time since she is high risk given history of multiple surgeries after perforated gastric ulcer. Patient plans to seek a second opinion at [facility], [facility] or [facility].   -- Will restart diuretic regimen when appropriate.    Headache  -- PRN tylenol   -- Will consider migraine cocktail if needed      RUQ abdominal pain  Pt has had complaints of abdominal pain throughout hospital stay -- she received 2 doses of dilaudid and is now receiving spot doses of oxycodone 5mg prn. Although suspicion for SBP is initially low, patient remains distended and with mild tenderness in the abdomen. Will treat for SBP.    Plan:  -- Pain medication as appropriate  -- CTX 1g QD for 5 days - end date 1/4/2025.    Acquired hypothyroidism  -- Continue home Levothyroxine    S/P liver transplant 9/23/2002 for von  Gierke disease  known liver transplant in 9/2002.     Plan:  -- Hepatology consulted to assist in immunosuppressive medicine management; recommending continuing home tacrolimus 0.5mg BID  -- Daily tacrolimus level and CMP        VTE Risk Mitigation (From admission, onward)           Ordered     heparin (porcine) injection 5,000 Units  Every 8 hours         12/29/24 1331     Place sequential compression device  Until discontinued         12/29/24 1331     IP VTE LOW RISK PATIENT  Once         12/29/24 1331     Place sequential compression device  Until discontinued         12/29/24 1254                    Discharge Planning   MARILEE: 1/5/2025     Code Status: Full Code   Medical Readiness for Discharge Date:   Discharge Plan A: Home with family            Janet Casillas DO  Department of Hospital Medicine   [facility] - Transplant Stepdown

## 2025-01-04 NOTE — PLAN OF CARE
- Lactulose TID for HE treatment/prevention.  Pt with >5 BM in past 24 hr, skip afternoon lactulose  - H/H improved today  - Additional doses of albumin d/c  - Rifaxamin continued  - US retroperitoneal completed

## 2025-01-04 NOTE — PLAN OF CARE
Pt AAOx4. VSS, afebrile. No complaints of SOB/CP/discomfort. Pt given one oxy 5 mg O/N for pain. Voids in hat w/ standby assist. No BM reported. Pt up OOB w/ standby assist. Lactulose and rifaxamin given per MAR. H/H 8.1 and 24.5. Na 127. Reviewed plan of care w/ pt. Remained free from falls/traumas/injuries. Questions answered and encouraged. Call light at bedside, non-skid socks on. Bed in low position, wheels locked. Standard precautions maintained.

## 2025-01-05 VITALS
BODY MASS INDEX: 26.74 KG/M2 | DIASTOLIC BLOOD PRESSURE: 66 MMHG | SYSTOLIC BLOOD PRESSURE: 150 MMHG | HEIGHT: 59 IN | HEART RATE: 79 BPM | TEMPERATURE: 99 F | RESPIRATION RATE: 20 BRPM | WEIGHT: 132.63 LBS | OXYGEN SATURATION: 95 %

## 2025-01-05 LAB
ALBUMIN SERPL BCP-MCNC: 3.8 G/DL (ref 3.5–5.2)
ALP SERPL-CCNC: 232 U/L (ref 40–150)
ALT SERPL W/O P-5'-P-CCNC: 20 U/L (ref 10–44)
ANION GAP SERPL CALC-SCNC: 8 MMOL/L (ref 8–16)
AST SERPL-CCNC: 45 U/L (ref 10–40)
BASOPHILS # BLD AUTO: 0.02 K/UL (ref 0–0.2)
BASOPHILS NFR BLD: 0.6 % (ref 0–1.9)
BILIRUB SERPL-MCNC: 5.9 MG/DL (ref 0.1–1)
BUN SERPL-MCNC: 32 MG/DL (ref 6–20)
CALCIUM SERPL-MCNC: 9.5 MG/DL (ref 8.7–10.5)
CHLORIDE SERPL-SCNC: 101 MMOL/L (ref 95–110)
CO2 SERPL-SCNC: 21 MMOL/L (ref 23–29)
CREAT SERPL-MCNC: 1.2 MG/DL (ref 0.5–1.4)
DIFFERENTIAL METHOD BLD: ABNORMAL
EOSINOPHIL # BLD AUTO: 0.2 K/UL (ref 0–0.5)
EOSINOPHIL NFR BLD: 4.3 % (ref 0–8)
ERYTHROCYTE [DISTWIDTH] IN BLOOD BY AUTOMATED COUNT: 15.7 % (ref 11.5–14.5)
EST. GFR  (NO RACE VARIABLE): 53.1 ML/MIN/1.73 M^2
GLUCOSE SERPL-MCNC: 57 MG/DL (ref 70–110)
HCT VFR BLD AUTO: 24.6 % (ref 37–48.5)
HGB BLD-MCNC: 8 G/DL (ref 12–16)
IMM GRANULOCYTES # BLD AUTO: 0.01 K/UL (ref 0–0.04)
IMM GRANULOCYTES NFR BLD AUTO: 0.3 % (ref 0–0.5)
LYMPHOCYTES # BLD AUTO: 0.5 K/UL (ref 1–4.8)
LYMPHOCYTES NFR BLD: 13.5 % (ref 18–48)
MAGNESIUM SERPL-MCNC: 2 MG/DL (ref 1.6–2.6)
MCH RBC QN AUTO: 30.3 PG (ref 27–31)
MCHC RBC AUTO-ENTMCNC: 32.5 G/DL (ref 32–36)
MCV RBC AUTO: 93 FL (ref 82–98)
MONOCYTES # BLD AUTO: 0.7 K/UL (ref 0.3–1)
MONOCYTES NFR BLD: 20.3 % (ref 4–15)
NEUTROPHILS # BLD AUTO: 2.1 K/UL (ref 1.8–7.7)
NEUTROPHILS NFR BLD: 61 % (ref 38–73)
NRBC BLD-RTO: 0 /100 WBC
PHOSPHATE SERPL-MCNC: 2.6 MG/DL (ref 2.7–4.5)
PLATELET # BLD AUTO: 68 K/UL (ref 150–450)
PMV BLD AUTO: 10.8 FL (ref 9.2–12.9)
POTASSIUM SERPL-SCNC: 4 MMOL/L (ref 3.5–5.1)
PROT SERPL-MCNC: 6.3 G/DL (ref 6–8.4)
RBC # BLD AUTO: 2.64 M/UL (ref 4–5.4)
SODIUM SERPL-SCNC: 130 MMOL/L (ref 136–145)
TACROLIMUS BLD-MCNC: 5.6 NG/ML (ref 5–15)
WBC # BLD AUTO: 3.49 K/UL (ref 3.9–12.7)

## 2025-01-05 PROCEDURE — 83735 ASSAY OF MAGNESIUM: CPT

## 2025-01-05 PROCEDURE — 80053 COMPREHEN METABOLIC PANEL: CPT

## 2025-01-05 PROCEDURE — 84100 ASSAY OF PHOSPHORUS: CPT

## 2025-01-05 PROCEDURE — 36415 COLL VENOUS BLD VENIPUNCTURE: CPT

## 2025-01-05 PROCEDURE — 63600175 PHARM REV CODE 636 W HCPCS

## 2025-01-05 PROCEDURE — 25000003 PHARM REV CODE 250: Performed by: HOSPITALIST

## 2025-01-05 PROCEDURE — 85025 COMPLETE CBC W/AUTO DIFF WBC: CPT

## 2025-01-05 PROCEDURE — 80197 ASSAY OF TACROLIMUS: CPT

## 2025-01-05 PROCEDURE — 25000003 PHARM REV CODE 250

## 2025-01-05 RX ORDER — SPIRONOLACTONE 50 MG/1
25 TABLET, FILM COATED ORAL DAILY
Start: 2025-01-05

## 2025-01-05 RX ORDER — LACTULOSE 10 G/15ML
10 SOLUTION ORAL 3 TIMES DAILY
Qty: 946 ML | Refills: 0 | Status: SHIPPED | OUTPATIENT
Start: 2025-01-05

## 2025-01-05 RX ADMIN — TACROLIMUS 0.5 MG: 0.5 CAPSULE ORAL at 08:01

## 2025-01-05 RX ADMIN — LACTULOSE 10 G: 20 SOLUTION ORAL at 08:01

## 2025-01-05 RX ADMIN — VENLAFAXINE HYDROCHLORIDE 150 MG: 75 CAPSULE, EXTENDED RELEASE ORAL at 08:01

## 2025-01-05 RX ADMIN — OXYCODONE 5 MG: 5 TABLET ORAL at 05:01

## 2025-01-05 RX ADMIN — RIFAXIMIN 550 MG: 550 TABLET ORAL at 08:01

## 2025-01-05 RX ADMIN — LEVOTHYROXINE SODIUM 75 MCG: 75 TABLET ORAL at 05:01

## 2025-01-05 RX ADMIN — HEPARIN SODIUM 5000 UNITS: 5000 INJECTION INTRAVENOUS; SUBCUTANEOUS at 05:01

## 2025-01-05 RX ADMIN — ONDANSETRON 4 MG: 2 INJECTION INTRAMUSCULAR; INTRAVENOUS at 05:01

## 2025-01-05 RX ADMIN — ONDANSETRON 4 MG: 2 INJECTION INTRAMUSCULAR; INTRAVENOUS at 12:01

## 2025-01-05 NOTE — PLAN OF CARE
Pt AAOx4. VSS, afebrile. No complaints of SOB/CP/discomfort. Pt given no prn pain meds O/N. Voids in hat independently. No BM reported. Pt up OOB independently. Lactulose and rifaxamin given per MAR. H/H 7.8 and 23.3. Na 129. 1.5 L FR maintained. Reviewed plan of care w/ pt. Remained free from falls/traumas/injuries. Questions answered and encouraged. Call light at bedside, non-skid socks on. Bed in low position, wheels locked. Standard precautions maintained.

## 2025-01-05 NOTE — PLAN OF CARE
[facility] - Transplant Stepdown   Discharge Final Note     Primary Care Provider: David Lorenzo MD     Expected Discharge Date: 1/5/25     Patient cleared for discharge from case management standpoint.      01/05/25 1500   Final Note   Assessment Type Final Discharge Note   Anticipated Discharge Disposition Home   What phone number can be called within the next 1-3 days to see how you are doing after discharge? [phone]   Hospital Resources/Appts/Education Provided Provided patient/caregiver with written discharge plan information;Appointments scheduled and added to AVS   Offered [facility] -- Bedside Delivery? Yes   Referral to Outpatient Case Management complete? No   Referral to / orders for Home Health Complete? n/a   Any social issues identified prior to discharge? No   Discharge plans and expectations educations in teach back method with documentation complete? Yes   30 day supply of medicines given at discharge, if documented non-compliance / non-adherence? n/a   Did you assess the readiness or willingness of the family or caregiver to support self management of care? Yes   Hospital Follow Up  Appt(s) scheduled? Yes   Post-Acute Status   Other Status No Post-Acute Service Needs   Discharge Delays None known at this time       Future Appointments   Date Time Provider Department Center   1/27/2025  3:40 PM David Lorenzo MD [facility]   9/12/2025  8:20 AM Dave Upton MD [facility] CARDIO1 Marta Fabian RN  Weekend  - [facility] [place]  [phone]

## 2025-01-05 NOTE — PLAN OF CARE
Patient remained free from injury.  Patient ambulated independently in room and to restroom.  Patient reports she feels steady on her feet and knows her limitations.  Patient afebrile and no s/s of infection.  Na improved since admit.  Patient not eating much because of dislike of food.  Patient's  got patient a smoothie.  Reviewed medications.  Patient reports that patient unable to fill Rifaximin secondary to cost.  Notified team and unable to receive price reduction through [facility].  Team aware and instructed and educated patient that she will need to have 2-3 adequate BMs a day to help prevent encephalopathy.  Patient and  verbalize understanding.  Patient reports ready to dc.

## 2025-01-05 NOTE — SUBJECTIVE & OBJECTIVE
Interval History: Na level up trending. Patient reports being comfortable this AM. Last BM at 5 AM, more formed than yesterday which was watery. No signs of confusion on PE. Plan for potential discharging     Review of Systems   Constitutional:  Positive for fatigue. Negative for fever.   HENT:  Negative for congestion.    Eyes: Negative.    Respiratory:  Positive for shortness of breath.    Cardiovascular:  Negative for leg swelling.   Gastrointestinal:  Positive for abdominal distention and abdominal pain. Negative for vomiting.   Endocrine: Negative.    Genitourinary: Negative.    Musculoskeletal: Negative.    Skin: Negative.    Allergic/Immunologic: Negative.    Neurological:  Positive for tremors.   Hematological: Negative.    Psychiatric/Behavioral: Negative.       Objective:     Vital Signs (Most Recent):  Temp: 98.2 °F (36.8 °C) (01/05/25 0513)  Pulse: 85 (01/05/25 0513)  Resp: 18 (01/05/25 0524)  BP: (!) 142/67 (01/05/25 0513)  SpO2: (!) 94 % (01/05/25 0513) Vital Signs (24h Range):  Temp:  [98.1 °F (36.7 °C)-99.2 °F (37.3 °C)] 98.2 °F (36.8 °C)  Pulse:  [79-85] 85  Resp:  [16-18] 18  SpO2:  [94 %-98 %] 94 %  BP: (142-174)/(66-77) 142/67     Weight: 60.2 kg (132 lb 9.7 oz)  Body mass index is 26.78 kg/m².    Intake/Output Summary (Last 24 hours) at 1/5/2025 0821  Last data filed at 1/5/2025 0527  Gross per 24 hour   Intake 1980 ml   Output 1500 ml   Net 480 ml         Physical Exam  Vitals and nursing note reviewed.   Constitutional:       General: She is not in acute distress.     Appearance: She is ill-appearing (Chronically ill-looking).   HENT:      Head: Normocephalic and atraumatic.      Mouth/Throat:      Mouth: Mucous membranes are moist.   Eyes:      General: Scleral icterus present.   Cardiovascular:      Rate and Rhythm: Normal rate and regular rhythm.   Pulmonary:      Effort: Pulmonary effort is normal. No respiratory distress.   Abdominal:      General: There is distension.      Tenderness:  There is abdominal tenderness (mild).   Musculoskeletal:      Right lower leg: No edema.      Left lower leg: No edema.   Skin:     General: Skin is warm and dry.      Coloration: Skin is jaundiced (mild).   Neurological:      General: No focal deficit present.      Mental Status: She is alert and oriented to person, place, and time.      Comments: Asterixis present   Psychiatric:         Mood and Affect: Mood normal.         Behavior: Behavior normal.             Significant Labs: All pertinent labs within the past 24 hours have been reviewed.    Significant Imaging: I have reviewed all pertinent imaging results/findings within the past 24 hours.

## 2025-01-05 NOTE — DISCHARGE INSTRUCTIONS
Please restrict fluid intake to 1.2-1.5 L daily. Please take your lactulose with a goal to have 2-3 bowel movements a day. Please follow up with nephrology in 2-3 weeks they will give you a call. And please get an evaluation at a alternative institution for transplant options

## 2025-01-05 NOTE — PROGRESS NOTES
Patient's IVs dc.  Reviewed dc paperwork with patient's spouse and spouse verbalizes understanding.  Patient AAOx4, VS WNL and patient denies pain.  Patient left floor via wheelchair accompanied by  with all personal belongings in tow.

## 2025-01-05 NOTE — DISCHARGE SUMMARY
[facility] - Transplant [facility] Medicine  Discharge Summary      Patient Name: Elda Hernandez  MRN: [id number]  LUCY: [id number]  Patient Class: IP- Inpatient  Admission Date: 12/29/2024  Hospital Length of Stay: 7 days  Discharge Date and Time:  01/05/2025 11:47 AM  Attending Physician: Amber Guido*   Discharging Provider: Jarod Nieto DO  Primary Care Provider: David Lorenzo MD  Hospital Medicine Team: [facility] HOS[facility] MED 3 Jarod Nieto DO  Primary Care Team: [facility] 3    HPI:   Ms. 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 She presents to [facility] ED today with her spouse d/o hyponatremia, she gets routine lab, the recent lab shows sodium of 118 and she was told by her provider to come to the ED. Her chronic medications include Lasix, spironolactone, tacrolimus, levothyroxine, venlafaxine, ramipril, and pantoprazole. The patient reported that she has not been taking her antidiuretic and antihypertensive medications recently, except for Lasix, due to concerns about hyponatremia. She appears slightly confused, though this seems consistent with her baseline. She denies chest pain, fever, or chills.    In the ED, Afebrile, SBP slightly elevated (160), otherwise vitals were stable. Labs were remarkable for Na- 121, K-5.1, Tbil- 5.3, Alb- 2.4, ALP- 438, AST- 97, ALT- 49, PT- 14.4. Urine Osm pending.    Admit to hospital medicine for further work-up and management of hyponatremia.      * No surgery found *      Hospital Course:   Pt admitted to hospital medicine for further  workup and management of hyponatremia likely associated with hypovolemia vs cirrhosis vs medication noncompliance vs SSRI use. IM6 consulted for diagnostic paracentesis but no fluid pocket was noted on US. 500 cc NS bolus given and fluid restriction liberalized from 800 mL daily to 1.5L daily. Nephrology consulted for JACQUE and recommended mei catheter placement which pateint declined; also recommended holding diuretics in the setting of hyponatremia. Hepatology consulted for recommendations on management of tacrolimus who recommends continuing patient's home dose 0.5mg BID. Pt required 2 straight catheterizations since admission but declined mei. Repeat urine studies ordered after hyponatremia did not significantly improve, again not concerning for SIADH. Na improving with administration of albumin, fluid restriction discontinued. 1 unit of pRBCs given as patient found to have symptomatic anemia on 1/2/25. Albumin course completed.     Patient going to be discharged with refill of Lactulose with goal to have 2-3 BM's for hepatic encephalopathy. Discussed with nephrology diuretics regimen of Aldactone 25 mg daily with plans to follow up with Dr. Jarvis in clinic 2-3 weeks from now with labs. Will send Rifaximin as well to pharmacy for HE in addition to the lactulose. Patient not a transplant candidate per hepatology due to extensive surgical history. They discussed with her being evaluated at an alternate transplant center for any possible plans for intervention. Patient verbalized understanding and had no further questions. Soft Dysphagia diet with 1.2-1.5 L fluid restriction.      Goals of Care Treatment Preferences:  Code Status: Full Code          What is most important right now is to focus on remaining as independent as possible.  Accordingly, we have decided that the best plan to meet the patient's goals includes continuing with treatment.      SDOH Screening:  The patient was screened for utility  difficulties, food insecurity, transport difficulties, housing insecurity, and interpersonal safety and there were no concerns identified this admission.     Consults:   Consults (From admission, onward)          Status Ordering Provider     Inpatient consult to Midline team  Once        Provider:  (Not yet assigned)    Completed AUSTIN MCGOVERN     Inpatient consult to Nephrology  Once        Provider:  (Not yet assigned)    Completed KATHYA CHEW     Inpatient consult to [facility] Medicine-General  Once        Provider:  (Not yet assigned)    Completed GREGOR PIKE     Inpatient consult to Hepatology  Once        Provider:  (Not yet assigned)    Completed ISABELLE KEEN     Inpatient consult to Midline team  Once        Provider:  (Not yet assigned)    Completed SHANNON ANAYA            * Hyponatremia  54y.o F with a PMHx of von Gierke disease s/p liver transplant in 2002 (on chronic immunosuppression with tacrolimus), 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     The patient's most recent sodium results are listed below.  Recent Labs     01/02/25  0531 01/03/25  0531 01/04/25  0545   * 127* 129*       Plan  - Obtain the following studies: Urine sodium, urine osmolality, serum osmolality.   - Repeat urine studies ordered on 1/1/25 after no significant improvement in hyponatremia was noted -- no evidence of SIADH.  - Will treat the hyponatremia with IV fluids as needed   - 500cc one time bolus of NS given.  - Monitor sodium Daily.   - Fluid restriction of 1.5L  - Nephrology consulted   - Albumin 25g TID course completed          Anemia  Anemia is likely due to chronic disease due to  Chronic liver disease. Most recent hemoglobin and hematocrit are listed below.  Recent Labs     01/02/25  0812 01/03/25  0531 01/04/25  0545   HGB 7.1* 8.1* 7.8*   HCT 20.9* 24.5* 23.3*       Plan  - Monitor serial CBC: Daily  - Transfuse PRBC if patient becomes hemodynamically unstable, symptomatic or H/H drops below 7/21.  - Patient has received 1 units of PRBCs on 1/2/2025  - Patient's anemia is currently stable    JACQUE (acute kidney injury)  JACQUE is likely due to  hypovolemia vs hepatorenal syndrome . Urine output remains low and patient has required 2 straight catheterizations during this admission. Baseline creatinine is  1.0-1.1 . Most recent creatinine and eGFR are listed below.  Recent Labs     01/02/25  0531 01/03/25  0531 01/04/25  0545   CREATININE 1.6* 1.4 1.2   EGFRNORACEVR 37.6* 44.2* 53.1*        Plan  - JACQUE is worsening.  - Nephrology consulted for recommendations and evaluation for initiation of terlipressin   - Nephrology recommending against terlipressin.    - Recommend mei catheter for strict I and O monitoring -- patient declined but has been urinating well into hat for monitoring    - Albumin 25g TID course complete   - Renal US 1/3/25: Elevated intraparenchymal resistive indices which is nonspecific but may be seen with medical renal disease. Nonobstructing left renal stone. Mildly complicated right renal cyst.   - Avoid nephrotoxins and renally dose meds for GFR listed above  - Monitor urine output, serial BMP, and adjust therapy as needed    Cirrhosis of liver with ascites  Patient with known history of cirrhosis.    MELD-Na score calculated; MELD 3.0: 28 at 12/31/2024  6:40 PM  MELD-Na: 27 at 12/31/2024  6:40 PM  Calculated from:  Serum Creatinine: 1.5 mg/dL at 12/31/2024  6:40 PM  Serum Sodium: 124 mmol/L (Using min of 125 mmol/L) at 12/31/2024  6:40 PM  Total Bilirubin: 5.2 mg/dL at 12/31/2024  6:09 AM  Serum Albumin: 2.8 g/dL at 12/31/2024  6:09 AM  INR(ratio): 1.3 at 12/29/2024  9:47  AM  Age at listing (hypothetical): 54 years  Sex: Female at 12/31/2024  6:40 PM      Plan:  -- Continue lactulose TID; titrate to 3-4 bowel movements per day.   -- Continue rifaximin.  -- Avoid any hepatotoxic meds  -- Monitor CBC/CMP/INR for synthetic function.  -- IM6 team consulted for possible diagnostic paracentesis; bedside US showed no available fluid pocket to drain; abdominal distension likely associated with gas. Suspicion for SBP at this time is low but CTX/albumin initiated in the setting of abdominal distention and pain.   -- Hepatology consulted - patient is not a candidate for retransplantation at this time since she is high risk given history of multiple surgeries after perforated gastric ulcer. Patient plans to seek a second opinion at [facility], [facility] or [place].   -- Will restart diuretic regimen when appropriate.    Headache  -- PRN tylenol   -- Will consider migraine cocktail if needed      RUQ abdominal pain  Pt has had complaints of abdominal pain throughout hospital stay -- she received 2 doses of dilaudid and is now receiving spot doses of oxycodone 5mg prn. Although suspicion for SBP is initially low, patient remains distended and with mild tenderness in the abdomen. Will treat for SBP.    Plan:  -- Pain medication as appropriate  -- CTX 1g QD for 5 days - end date 1/4/2025.    Acquired hypothyroidism  -- Continue home Levothyroxine    S/P liver transplant 9/23/2002 for von Gierke disease  known liver transplant in 9/2002.     Plan:  -- Hepatology consulted to assist in immunosuppressive medicine management; recommending continuing home tacrolimus 0.5mg BID  -- Daily tacrolimus level and CMP        Final Active Diagnoses:    Diagnosis Date Noted POA    PRINCIPAL PROBLEM:  Hyponatremia [E87.1] 03/04/2023 Yes     Chronic    Anemia [D64.9] 01/02/2025 Yes    JACQUE (acute kidney injury) [N17.9] 12/31/2024 Yes    Cirrhosis of liver with ascites [K74.60, R18.8] 03/25/2022 Yes     Chronic     Headache [R51.9] 11/09/2021 Yes    RUQ abdominal pain [R10.11] 04/26/2015 Yes    S/P liver transplant 9/23/2002 for von Gierke disease [Z94.4] 10/12/2013 Not Applicable     Chronic    Acquired hypothyroidism [E03.9] 10/12/2013 Yes      Problems Resolved During this Admission:       Discharged Condition: stable    Disposition: Home or Self Care    Follow Up:    Patient Instructions:      BASIC METABOLIC PANEL   Standing Status: Future Standing Exp. Date: 04/05/26     Order Specific Question Answer Comments   Send normal result to authorizing provider's In Basket if patient is active on MyChart: Yes      Ambulatory referral/consult to Nephrology   Standing Status: Future   Referral Priority: Routine Referral Type: Consultation   Referral Reason: Specialty Services Required   Requested Specialty: Nephrology   Number of Visits Requested: 1     Diet Dysphagia Soft   Order Comments: Please restrict fluids to 1.2-1.5 L       Significant Diagnostic Studies: N/A    Pending Diagnostic Studies:       Procedure Component Value Units Date/Time    Urinalysis, Reflex to Urine Culture Urine, Catheterized [[id number]] Collected: 12/30/24 0801    Order Status: Sent Lab Status: In process Updated: 12/30/24 0801    Specimen: Urine, Clean Catch            Medications:  Reconciled Home Medications:      Medication List        START taking these medications      lactulose 20 gram/30 mL Soln  Commonly known as: CHRONULAC  Take 15 mLs (10 g total) by mouth 3 (three) times daily.     promethazine 25 MG tablet  Commonly known as: PHENERGAN  TAKE 1 TABLET(25 MG) BY MOUTH EVERY 6 HOURS AS NEEDED FOR NAUSEA     rifAXIMin 550 mg Tab  Commonly known as: XIFAXAN  Take 1 tablet (550 mg total) by mouth 2 (two) times daily.            CHANGE how you take these medications      spironolactone 50 MG tablet  Commonly known as: ALDACTONE  Take 0.5 tablets (25 mg total) by mouth once daily.  What changed: how much to take            CONTINUE taking  these medications      diphenhydrAMINE 25 mg capsule  Commonly known as: BENADRYL  Take 25 mg by mouth daily as needed for Allergies.     levothyroxine 75 MCG tablet  Commonly known as: SYNTHROID  Take 1 tablet (75 mcg total) by mouth once daily.     magnesium oxide 400 mg (241.3 mg magnesium) tablet  Commonly known as: MAG-OX  TAKE 1 TABLET(400 MG) BY MOUTH TWICE DAILY     ONE DAILY MULTIVITAMIN per tablet  Generic drug: multivitamin  Take 1 tablet by mouth once daily.     pantoprazole 20 MG tablet  Commonly known as: PROTONIX  Take 1 tablet (20 mg total) by mouth once daily.     pravastatin 20 MG tablet  Commonly known as: PRAVACHOL  Take 1 tablet (20 mg total) by mouth once daily. Need OFFICE VISIT before next refill, last seen 9/2023. This will be the LAST Rx if visit is not made.     ramipriL 5 MG capsule  Commonly known as: ALTACE  TAKE 1 CAPSULE(5 MG) BY MOUTH EVERY DAY     tacrolimus 0.5 MG Cap  Commonly known as: PROGRAF  Take 1 capsule (0.5 mg total) by mouth every 12 (twelve) hours.     venlafaxine 150 MG Cp24  Commonly known as: EFFEXOR-XR  Take 1 capsule (150 mg total) by mouth once daily.            STOP taking these medications      furosemide 20 MG tablet  Commonly known as: LASIX              Indwelling Lines/Drains at time of discharge:   Lines/Drains/Airways       None                   Time spent on the discharge of patient: 40 minutes         Jarod Nieto DO  Department of Hospital Medicine  [facility] - Transplant Stepdown

## 2025-01-05 NOTE — PROGRESS NOTES
[facility] - [facility] Medicine  Progress Note    Patient Name: Elda Hernandez  MRN: [id number]  Patient Class: IP- Inpatient   Admission Date: 12/29/2024  Length of Stay: 7 days  Attending Physician: Amber Guido*  Primary Care Provider: David Lorenzo MD        Subjective     Principal Problem:Hyponatremia        HPI:  Ms. 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 She presents to [facility] ED today with her spouse d/o hyponatremia, she gets routine lab, the recent lab shows sodium of 118 and she was told by her provider to come to the ED. Her chronic medications include Lasix, spironolactone, tacrolimus, levothyroxine, venlafaxine, ramipril, and pantoprazole. The patient reported that she has not been taking her antidiuretic and antihypertensive medications recently, except for Lasix, due to concerns about hyponatremia. She appears slightly confused, though this seems consistent with her baseline. She denies chest pain, fever, or chills.    In the ED, Afebrile, SBP slightly elevated (160), otherwise vitals were stable. Labs were remarkable for Na- 121, K-5.1, Tbil- 5.3, Alb- 2.4, ALP- 438, AST- 97, ALT- 49, PT- 14.4. Urine Osm pending.    Admit to hospital medicine for further work-up and management of hyponatremia.      Overview/Hospital Course:  Pt admitted to hospital medicine for further workup and management of hyponatremia likely associated with hypovolemia vs cirrhosis vs medication noncompliance vs SSRI use. IM6 consulted for diagnostic paracentesis but  no fluid pocket was noted on US. 500 cc NS bolus given and fluid restriction liberalized from 800 mL daily to 1.5L daily. Nephrology consulted for JACQUE and recommended mei catheter placement which pateint declined; also recommended holding diuretics in the setting of hyponatremia. Hepatology consulted for recommendations on management of tacrolimus who recommends continuing patient's home dose 0.5mg BID. Pt required 2 straight catheterizations since admission but declined mei. Repeat urine studies ordered after hyponatremia did not significantly improve, again not concerning for SIADH. Na improving with administration of albumin, fluid restriction discontinued. 1 unit of pRBCs given as patient found to have symptomatic anemia on 1/2/25. Albumin course completed.     Interval History: Na level up trending. Patient reports being comfortable this AM. Last BM at 5 AM, more formed than yesterday which was watery. No signs of confusion on PE. Plan for potential discharging     Review of Systems   Constitutional:  Positive for fatigue. Negative for fever.   HENT:  Negative for congestion.    Eyes: Negative.    Respiratory:  Positive for shortness of breath.    Cardiovascular:  Negative for leg swelling.   Gastrointestinal:  Positive for abdominal distention and abdominal pain. Negative for vomiting.   Endocrine: Negative.    Genitourinary: Negative.    Musculoskeletal: Negative.    Skin: Negative.    Allergic/Immunologic: Negative.    Neurological:  Positive for tremors.   Hematological: Negative.    Psychiatric/Behavioral: Negative.       Objective:     Vital Signs (Most Recent):  Temp: 98.2 °F (36.8 °C) (01/05/25 0513)  Pulse: 85 (01/05/25 0513)  Resp: 18 (01/05/25 0524)  BP: (!) 142/67 (01/05/25 0513)  SpO2: (!) 94 % (01/05/25 0513) Vital Signs (24h Range):  Temp:  [98.1 °F (36.7 °C)-99.2 °F (37.3 °C)] 98.2 °F (36.8 °C)  Pulse:  [79-85] 85  Resp:  [16-18] 18  SpO2:  [94 %-98 %] 94 %  BP: (142-174)/(66-77) 142/67      Weight: 60.2 kg (132 lb 9.7 oz)  Body mass index is 26.78 kg/m².    Intake/Output Summary (Last 24 hours) at 1/5/2025 0821  Last data filed at 1/5/2025 0598  Gross per 24 hour   Intake 1980 ml   Output 1500 ml   Net 480 ml         Physical Exam  Vitals and nursing note reviewed.   Constitutional:       General: She is not in acute distress.     Appearance: She is ill-appearing (Chronically ill-looking).   HENT:      Head: Normocephalic and atraumatic.      Mouth/Throat:      Mouth: Mucous membranes are moist.   Eyes:      General: Scleral icterus present.   Cardiovascular:      Rate and Rhythm: Normal rate and regular rhythm.   Pulmonary:      Effort: Pulmonary effort is normal. No respiratory distress.   Abdominal:      General: There is distension.      Tenderness: There is abdominal tenderness (mild).   Musculoskeletal:      Right lower leg: No edema.      Left lower leg: No edema.   Skin:     General: Skin is warm and dry.      Coloration: Skin is jaundiced (mild).   Neurological:      General: No focal deficit present.      Mental Status: She is alert and oriented to person, place, and time.      Comments: Asterixis present   Psychiatric:         Mood and Affect: Mood normal.         Behavior: Behavior normal.             Significant Labs: All pertinent labs within the past 24 hours have been reviewed.    Significant Imaging: I have reviewed all pertinent imaging results/findings within the past 24 hours.    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     The patient's most recent sodium results are listed below.  Recent Labs     01/02/25  0531 01/03/25  0531 01/04/25  0545   * 127* 129*       Plan  - Obtain the following studies: Urine sodium, urine osmolality, serum osmolality.   - Repeat urine studies ordered on 1/1/25 after no significant improvement in hyponatremia was noted -- no evidence of SIADH.  - Will treat the hyponatremia with IV fluids as needed   - 500cc one time bolus of NS given.  - Monitor sodium Daily.   - Fluid restriction of 1.5L  - Nephrology consulted   - Albumin 25g TID course completed          Anemia  Anemia is likely due to chronic disease due to Chronic liver disease. Most recent hemoglobin and hematocrit are listed below.  Recent Labs     01/02/25  0812 01/03/25  0531 01/04/25  0545   HGB 7.1* 8.1* 7.8*   HCT 20.9* 24.5* 23.3*       Plan  - Monitor serial CBC: Daily  - Transfuse PRBC if patient becomes hemodynamically unstable, symptomatic or H/H drops below 7/21.  - Patient has received 1 units of PRBCs on 1/2/2025  - Patient's anemia is currently stable    JACQUE (acute kidney injury)  JACQUE is likely due to  hypovolemia vs hepatorenal syndrome . Urine output remains low and patient has required 2 straight catheterizations during this admission. Baseline creatinine is  1.0-1.1 . Most recent creatinine and eGFR are listed below.  Recent Labs     01/02/25  0531 01/03/25  0531 01/04/25  0545   CREATININE 1.6* 1.4 1.2   EGFRNORACEVR 37.6* 44.2* 53.1*        Plan  - JACQUE is worsening.  - Nephrology consulted for recommendations and evaluation for initiation of terlipressin   - Nephrology recommending against terlipressin.    - Recommend mei catheter for strict I and O monitoring -- patient declined but has been urinating well into hat for monitoring    - Albumin 25g TID course complete   - Renal US 1/3/25: Elevated intraparenchymal resistive indices which is nonspecific but may be seen with medical renal disease. Nonobstructing  left renal stone. Mildly complicated right renal cyst.   - Avoid nephrotoxins and renally dose meds for GFR listed above  - Monitor urine output, serial BMP, and adjust therapy as needed    Cirrhosis of liver with ascites  Patient with known history of cirrhosis.    MELD-Na score calculated; MELD 3.0: 28 at 12/31/2024  6:40 PM  MELD-Na: 27 at 12/31/2024  6:40 PM  Calculated from:  Serum Creatinine: 1.5 mg/dL at 12/31/2024  6:40 PM  Serum Sodium: 124 mmol/L (Using min of 125 mmol/L) at 12/31/2024  6:40 PM  Total Bilirubin: 5.2 mg/dL at 12/31/2024  6:09 AM  Serum Albumin: 2.8 g/dL at 12/31/2024  6:09 AM  INR(ratio): 1.3 at 12/29/2024  9:47 AM  Age at listing (hypothetical): 54 years  Sex: Female at 12/31/2024  6:40 PM      Plan:  -- Continue lactulose TID; titrate to 3-4 bowel movements per day.   -- Continue rifaximin.  -- Avoid any hepatotoxic meds  -- Monitor CBC/CMP/INR for synthetic function.  -- IM6 team consulted for possible diagnostic paracentesis; bedside US showed no available fluid pocket to drain; abdominal distension likely associated with gas. Suspicion for SBP at this time is low but CTX/albumin initiated in the setting of abdominal distention and pain.   -- Hepatology consulted - patient is not a candidate for retransplantation at this time since she is high risk given history of multiple surgeries after perforated gastric ulcer. Patient plans to seek a second opinion at [facility], [facility] or [facility].   -- Will restart diuretic regimen when appropriate.    Headache  -- PRN tylenol   -- Will consider migraine cocktail if needed      RUQ abdominal pain  Pt has had complaints of abdominal pain throughout hospital stay -- she received 2 doses of dilaudid and is now receiving spot doses of oxycodone 5mg prn. Although suspicion for SBP is initially low, patient remains distended and with mild tenderness in the abdomen. Will treat for SBP.    Plan:  -- Pain medication as appropriate  -- CTX 1g QD for  5 days - end date 1/4/2025.    Acquired hypothyroidism  -- Continue home Levothyroxine    S/P liver transplant 9/23/2002 for von Gierke disease  known liver transplant in 9/2002.     Plan:  -- Hepatology consulted to assist in immunosuppressive medicine management; recommending continuing home tacrolimus 0.5mg BID  -- Daily tacrolimus level and CMP        VTE Risk Mitigation (From admission, onward)           Ordered     heparin (porcine) injection 5,000 Units  Every 8 hours         12/29/24 1331     Place sequential compression device  Until discontinued         12/29/24 1331     IP VTE LOW RISK PATIENT  Once         12/29/24 1331     Place sequential compression device  Until discontinued         12/29/24 1254                    Discharge Planning   MARILEE: 1/5/2025     Code Status: Full Code   Medical Readiness for Discharge Date:   Discharge Plan A: Home with family                        Samer DO Emilia  Department of Hospital Medicine   [facility] - Transplant Stepdown

## 2025-01-27 ENCOUNTER — PATIENT MESSAGE (OUTPATIENT)
Dept: TRANSPLANT | Facility: CLINIC | Age: 55
End: 2025-01-27
Payer: MEDICARE

## 2025-01-29 ENCOUNTER — TELEPHONE (OUTPATIENT)
Dept: FAMILY MEDICINE | Facility: CLINIC | Age: 55
End: 2025-01-29
Payer: MEDICARE

## 2025-01-29 NOTE — TELEPHONE ENCOUNTER
----- Message from Rafiq sent at 1/29/2025 12:06 PM CST -----  Regarding: orders  Type:  Needs Medical Advice    Who Called: marylin scott     Best Call Back Number: [phone]    Additional Information: marylin wants to know if  would place  orders. please call to discuss.

## 2025-01-29 NOTE — TELEPHONE ENCOUNTER
Home health nurse declined request for orders due to pt. No longer being discharged from hospital at this time.

## 2025-01-30 DIAGNOSIS — R11.0 NAUSEA: ICD-10-CM

## 2025-01-30 RX ORDER — PROMETHAZINE HYDROCHLORIDE 25 MG/1
25 TABLET ORAL EVERY 6 HOURS PRN
Qty: 20 TABLET | Refills: 0 | Status: SHIPPED | OUTPATIENT
Start: 2025-01-30

## 2025-01-30 NOTE — TELEPHONE ENCOUNTER
No care due was identified.  [facility] Embedded Care Due Messages. Reference number: [id number].   1/30/2025 1:34:58 AM CST

## 2025-02-14 ENCOUNTER — PATIENT MESSAGE (OUTPATIENT)
Dept: TRANSPLANT | Facility: CLINIC | Age: 55
End: 2025-02-14
Payer: MEDICARE

## 2025-02-14 ENCOUNTER — PATIENT MESSAGE (OUTPATIENT)
Dept: FAMILY MEDICINE | Facility: CLINIC | Age: 55
End: 2025-02-14
Payer: MEDICARE

## 2025-06-02 ENCOUNTER — PATIENT MESSAGE (OUTPATIENT)
Dept: FAMILY MEDICINE | Facility: CLINIC | Age: 55
End: 2025-06-02

## 2025-06-02 ENCOUNTER — OFFICE VISIT (OUTPATIENT)
Dept: FAMILY MEDICINE | Facility: CLINIC | Age: 55
End: 2025-06-02
Payer: MEDICARE

## 2025-06-02 VITALS
SYSTOLIC BLOOD PRESSURE: 120 MMHG | HEART RATE: 63 BPM | OXYGEN SATURATION: 99 % | WEIGHT: 110.25 LBS | DIASTOLIC BLOOD PRESSURE: 58 MMHG | BODY MASS INDEX: 22.23 KG/M2 | HEIGHT: 59 IN

## 2025-06-02 DIAGNOSIS — N17.9 AKI (ACUTE KIDNEY INJURY): ICD-10-CM

## 2025-06-02 DIAGNOSIS — F32.1 CURRENT MODERATE EPISODE OF MAJOR DEPRESSIVE DISORDER WITHOUT PRIOR EPISODE: ICD-10-CM

## 2025-06-02 DIAGNOSIS — R11.0 NAUSEA: ICD-10-CM

## 2025-06-02 DIAGNOSIS — Q07.00 ARNOLD-CHIARI SYNDROME WITHOUT SPINA BIFIDA OR HYDROCEPHALUS: ICD-10-CM

## 2025-06-02 DIAGNOSIS — E74.01: ICD-10-CM

## 2025-06-02 DIAGNOSIS — E03.9 ACQUIRED HYPOTHYROIDISM: ICD-10-CM

## 2025-06-02 DIAGNOSIS — Z94.4 S/P LIVER TRANSPLANT: Primary | ICD-10-CM

## 2025-06-02 DIAGNOSIS — I10 ESSENTIAL HYPERTENSION: ICD-10-CM

## 2025-06-02 PROBLEM — D61.818 PANCYTOPENIA: Chronic | Status: RESOLVED | Noted: 2023-08-16 | Resolved: 2025-06-02

## 2025-06-02 PROCEDURE — 99999 PR PBB SHADOW E&M-EST. PATIENT-LVL III: CPT | Mod: PBBFAC,,, | Performed by: INTERNAL MEDICINE

## 2025-06-02 PROCEDURE — 1160F RVW MEDS BY RX/DR IN RCRD: CPT | Mod: CPTII,S$GLB,, | Performed by: INTERNAL MEDICINE

## 2025-06-02 PROCEDURE — 3078F DIAST BP <80 MM HG: CPT | Mod: CPTII,S$GLB,, | Performed by: INTERNAL MEDICINE

## 2025-06-02 PROCEDURE — 99214 OFFICE O/P EST MOD 30 MIN: CPT | Mod: S$GLB,,, | Performed by: INTERNAL MEDICINE

## 2025-06-02 PROCEDURE — 3044F HG A1C LEVEL LT 7.0%: CPT | Mod: CPTII,S$GLB,, | Performed by: INTERNAL MEDICINE

## 2025-06-02 PROCEDURE — 3074F SYST BP LT 130 MM HG: CPT | Mod: CPTII,S$GLB,, | Performed by: INTERNAL MEDICINE

## 2025-06-02 PROCEDURE — 1159F MED LIST DOCD IN RCRD: CPT | Mod: CPTII,S$GLB,, | Performed by: INTERNAL MEDICINE

## 2025-06-02 PROCEDURE — 4010F ACE/ARB THERAPY RXD/TAKEN: CPT | Mod: CPTII,S$GLB,, | Performed by: INTERNAL MEDICINE

## 2025-06-02 PROCEDURE — G2211 COMPLEX E/M VISIT ADD ON: HCPCS | Mod: S$GLB,,, | Performed by: INTERNAL MEDICINE

## 2025-06-02 PROCEDURE — 3008F BODY MASS INDEX DOCD: CPT | Mod: CPTII,S$GLB,, | Performed by: INTERNAL MEDICINE

## 2025-06-02 RX ORDER — PROMETHAZINE HYDROCHLORIDE 25 MG/1
25 TABLET ORAL EVERY 6 HOURS PRN
Qty: 30 TABLET | Refills: 2 | Status: SHIPPED | OUTPATIENT
Start: 2025-06-02

## 2025-06-09 ENCOUNTER — PATIENT MESSAGE (OUTPATIENT)
Dept: FAMILY MEDICINE | Facility: CLINIC | Age: 55
End: 2025-06-09
Payer: MEDICARE

## 2025-06-09 DIAGNOSIS — G62.0 DRUG-INDUCED PERIPHERAL NEUROPATHY: Primary | Chronic | ICD-10-CM

## 2025-06-09 RX ORDER — GABAPENTIN 100 MG/1
100 CAPSULE ORAL 3 TIMES DAILY
COMMUNITY
Start: 2025-05-16 | End: 2025-06-09 | Stop reason: SDUPTHER

## 2025-06-11 RX ORDER — GABAPENTIN 100 MG/1
100 CAPSULE ORAL 3 TIMES DAILY
Qty: 270 CAPSULE | Refills: 3 | Status: SHIPPED | OUTPATIENT
Start: 2025-06-11

## 2025-06-11 NOTE — TELEPHONE ENCOUNTER
No care due was identified.  [facility] Embedded Care Due Messages. Reference number: [id number].   6/11/2025 8:51:57 AM CDT

## 2025-06-17 ENCOUNTER — OFFICE VISIT (OUTPATIENT)
Dept: OPHTHALMOLOGY | Facility: CLINIC | Age: 55
End: 2025-06-17
Payer: MEDICARE

## 2025-06-17 DIAGNOSIS — H04.222: ICD-10-CM

## 2025-06-17 DIAGNOSIS — H04.123 DRY EYE SYNDROME, BILATERAL: ICD-10-CM

## 2025-06-17 DIAGNOSIS — H43.811 POSTERIOR VITREOUS DETACHMENT OF RIGHT EYE: Primary | ICD-10-CM

## 2025-06-17 PROCEDURE — 3044F HG A1C LEVEL LT 7.0%: CPT | Mod: CPTII,S$GLB,, | Performed by: OPHTHALMOLOGY

## 2025-06-17 PROCEDURE — 4010F ACE/ARB THERAPY RXD/TAKEN: CPT | Mod: CPTII,S$GLB,, | Performed by: OPHTHALMOLOGY

## 2025-06-17 PROCEDURE — 99999 PR PBB SHADOW E&M-EST. PATIENT-LVL III: CPT | Mod: PBBFAC,,, | Performed by: OPHTHALMOLOGY

## 2025-06-17 PROCEDURE — 1160F RVW MEDS BY RX/DR IN RCRD: CPT | Mod: CPTII,S$GLB,, | Performed by: OPHTHALMOLOGY

## 2025-06-17 PROCEDURE — 92004 COMPRE OPH EXAM NEW PT 1/>: CPT | Mod: S$GLB,,, | Performed by: OPHTHALMOLOGY

## 2025-06-17 PROCEDURE — 1159F MED LIST DOCD IN RCRD: CPT | Mod: CPTII,S$GLB,, | Performed by: OPHTHALMOLOGY

## 2025-06-17 RX ORDER — CARVEDILOL 12.5 MG/1
12.5 TABLET ORAL 2 TIMES DAILY
COMMUNITY
Start: 2025-03-03

## 2025-06-17 RX ORDER — ASPIRIN 81 MG/1
81 TABLET ORAL
COMMUNITY
Start: 2025-06-04 | End: 2026-07-01

## 2025-06-17 RX ORDER — AMLODIPINE BESYLATE 10 MG/1
10 TABLET ORAL
COMMUNITY
Start: 2025-06-04 | End: 2026-06-30

## 2025-06-17 NOTE — PROGRESS NOTES
HPI    New patient here c/o seeing zig zag lines     Pt states seeing what looks like zig zag lines in OD for about a week,   also c/o OS has been watery for a few years. Denies pain/FOL/floaters    Family hx AMD (father)    Soothe XP and PM gel   Last edited by Tonya Gonzalez on 6/17/2025  8:31 AM.            Assessment /Plan     For exam results, see Encounter Report.    Posterior vitreous detachment of right eye    Epiphora due to insufficient drainage, left    Dry eye syndrome, bilateral      1. Posterior vitreous detachment of right eye (Primary)  No holes or tears on exam  Pt reassured  RD precautions reviewed    2. Epiphora due to insufficient drainage, left  Many year hx, tried different medications with limited success  +DDT OS today  Punctum is small  Possible NLDO  Refer to Oculoplastics    3. Dry eye syndrome, bilateral  Well controlled, ATs PRN    F/u 1 year optometry for routine exam

## 2025-07-01 NOTE — PROGRESS NOTES
"[facility]  Adult Nutrition  Progress Note    SUMMARY       Recommendations    1.) continue EN of Impact Peptide 1.5; goal rate at 35mL/hr. EN provides 1260kcal, 78gm of protein, 646mL of free water. Add free water per MD recommendations.   2.) wean pt from TPN.     Goals: pt to tolerate >85% of EEN/EPN by RD follow up  Nutrition Goal Status: goal met  Communication of RD Recs: other (comment)(POC)    Reason for Assessment    Reason For Assessment: RD follow-up  Diagnosis: (gastric outlet obstruction)  Relevant Medical History: Esophageal stricture; HTN; Liver fibrosis; Seizures; SCC; HTN; S/p liver tx  Interdisciplinary Rounds: did not attend  General Information Comments:NFPE completed 9/6, pt meets ASPEN guidelines for moderate malnutrition. Nsg staff reports pt is tolerating TF of Impact Peptide 1.5 at 35mL/hr. Per hospital records, pt had 21% weight gain since admission, likely due to fluid. Pt had ex lap on 9/4, gastric perforation discovered. POD #11 for abdominal closure. GI- LBM 9/17. Pt has TPN at 40mL/hr with lipids.   Nutrition Discharge Planning: unable to determine    Nutrition Risk Screen    Nutrition Risk Screen: tube feeding or parenteral nutrition    Nutrition/Diet History    Spiritual, Cultural Beliefs, Bahai Practices, Values that Affect Care: no  Food Allergies: NKFA  Factors Affecting Nutritional Intake: altered gastrointestinal function, NPO    Anthropometrics    Temp: 97.2 °F (36.2 °C)  Height Method: Stated  Height: 4' 11" (149.9 cm)  Height (inches): 59 in  Weight Method: Bed Scale  Weight: 68 kg (149 lb 14.6 oz)  Weight (lb): 149.91 lb  Ideal Body Weight (IBW), Female: 95 lb  % Ideal Body Weight, Female (lb): 129.96 %  BMI (Calculated): 30.3  BMI Grade: 25 - 29.9 - overweight       Lab/Procedures/Meds    Pertinent Labs Reviewed: reviewed  Pertinent Labs Comments: WBC 14.32, Hgb 9.5, Hct 31.1, Na 148, Cl 115, BUN 48, GFR 52.1, Glucose 115, Ca 8.3, Alb 1.5  Pertinent " This MA called parent of patient at this time to confirm upcoming appointment with Dr. Nagel.     Parent verbalized understanding of date/time for appointment and denies questions or concerns.    Confirmed apt with Mother   Medications Reviewed: reviewed  Pertinent Medications Comments: diflucan, heparin, levothyroxine, pantoprazole, piperacillin, tacrolimus    Physical Findings/Assessment     Edema 2+ generalized  Skin Left upper leg wound    Estimated/Assessed Needs    Weight Used For Calorie Calculations: 58 kg (127 lb 13.9 oz)  Energy Calorie Requirements (kcal): 1314  Energy Need Method: Turner-St Jeor(x1.2 PAL)  Protein Requirements: 69-75gm (1.2-1.3gm/kg)  Weight Used For Protein Calculations: 58 kg (127 lb 13.9 oz)  Fluid Requirements (mL): 1 mL/kcal or per MD     RDA Method (mL): 1314         Nutrition Prescription Ordered    Current Diet Order: NPO (9/4)  Nutrition Order Comments: TF of Impact Peptide 1.5 at 35mL/hr  Current Nutrition Support Formula Ordered: (Custom TPN)  Current Nutrition Support Rate Ordered: 40 (ml)  Current Nutrition Support Frequency Ordered: mL/hr    Evaluation of Received Nutrient/Fluid Intake    Enteral Calories (kcal): 1260  Enteral Protein (gm): 78  Enteral (Free Water) Fluid (mL): 646  Parenteral Calories (kcal): 1120  Parenteral Protein (gm): 70  Parenteral Fluid (mL): 1210  Lipid Calories (kcals): 500 kcals  GIR (Glucose Infusion Rate) (mg/kg/min): 1 mg/kg/min  Other Calories (kcal): 0  Total Calories (kcal): 2380  Total Calories (kcal/kg): 35  % Kcal Needs: 181  Total Protein (gm): 148  Total Protein (gm/kg): 2  % Protein Needs: 197  I/O: +17.4L since admission  Energy Calories Required: exceeds needs  Protein Required: exceeds needs  Fluid Required: other (see comments)  Comments: LBM 9/17  Tolerance: tolerating  % Intake of Estimated Energy Needs: 181  % Meal Intake: 0    Nutrition Risk    Level of Risk/Frequency of Follow-up: low , 1x weekly    Assessment and Plan    Moderate Protein-Calorie Malnutrition  Malnutrition in the context of Acute Illness/Injury     Related to (etiology):  Decreased intake      Signs and Symptoms (as evidenced by):  Energy Intake: <50% of estimated energy  requirement for >3 weeks   Muscle Mass Depletion: mild depletion of temples   Weight Loss: 11% x 3 weeks per family   Fluid Accumulation: moderate     Interventions(treatment strategy):  Collaboration of care with other providers     Nutrition Diagnosis Status:  ongoing      Monitor and Evaluation    Food and Nutrient Intake: energy intake, food and beverage intake, enteral nutrition intake  Food and Nutrient Adminstration: enteral and parenteral nutrition administration, diet order  Knowledge/Beliefs/Attitudes: food and nutrition knowledge/skill  Anthropometric Measurements: weight, weight change  Biochemical Data, Medical Tests and Procedures: electrolyte and renal panel, gastrointestinal profile, glucose/endocrine profile, inflammatory profile, lipid profile  Nutrition-Focused Physical Findings: overall appearance     Malnutrition Assessment  Malnutrition Type: acute illness or injury          Weight Loss (Malnutrition): (11% x 3 weeks)  Energy Intake (Malnutrition): less than or equal to 50% for greater than or equal to 1 month   Orbital Region (Subcutaneous Fat Loss): mild depletion  Upper Arm Region (Subcutaneous Fat Loss): well nourished   Sikh Region (Muscle Loss): mild depletion  Clavicle Bone Region (Muscle Loss): well nourished  Clavicle and Acromion Bone Region (Muscle Loss): well nourished                 Nutrition Follow-Up    RD Follow-up?: Yes

## 2025-07-07 ENCOUNTER — OFFICE VISIT (OUTPATIENT)
Dept: URGENT CARE | Facility: CLINIC | Age: 55
End: 2025-07-07
Payer: MEDICARE

## 2025-07-07 VITALS
BODY MASS INDEX: 22.98 KG/M2 | HEART RATE: 52 BPM | RESPIRATION RATE: 16 BRPM | SYSTOLIC BLOOD PRESSURE: 137 MMHG | WEIGHT: 114 LBS | HEIGHT: 59 IN | DIASTOLIC BLOOD PRESSURE: 73 MMHG | OXYGEN SATURATION: 100 % | TEMPERATURE: 98 F

## 2025-07-07 DIAGNOSIS — H65.93 MIDDLE EAR EFFUSION, BILATERAL: ICD-10-CM

## 2025-07-07 DIAGNOSIS — J02.9 SORE THROAT: Primary | ICD-10-CM

## 2025-07-07 DIAGNOSIS — J02.9 PHARYNGITIS, UNSPECIFIED ETIOLOGY: ICD-10-CM

## 2025-07-07 DIAGNOSIS — Z94.4 LIVER TRANSPLANTED: ICD-10-CM

## 2025-07-07 DIAGNOSIS — J01.40 ACUTE NON-RECURRENT PANSINUSITIS: ICD-10-CM

## 2025-07-07 LAB
CTP QC/QA: YES
FLUAV AG NPH QL: NEGATIVE
FLUBV AG NPH QL: NEGATIVE
S PYO RRNA THROAT QL PROBE: NEGATIVE
SARS-COV+SARS-COV-2 AG RESP QL IA.RAPID: NEGATIVE

## 2025-07-07 PROCEDURE — 87804 INFLUENZA ASSAY W/OPTIC: CPT | Mod: 59,QW,, | Performed by: STUDENT IN AN ORGANIZED HEALTH CARE EDUCATION/TRAINING PROGRAM

## 2025-07-07 PROCEDURE — 99204 OFFICE O/P NEW MOD 45 MIN: CPT | Mod: S$GLB,,, | Performed by: STUDENT IN AN ORGANIZED HEALTH CARE EDUCATION/TRAINING PROGRAM

## 2025-07-07 PROCEDURE — 87880 STREP A ASSAY W/OPTIC: CPT | Mod: QW,,, | Performed by: STUDENT IN AN ORGANIZED HEALTH CARE EDUCATION/TRAINING PROGRAM

## 2025-07-07 PROCEDURE — 87811 SARS-COV-2 COVID19 W/OPTIC: CPT | Mod: QW,S$GLB,, | Performed by: STUDENT IN AN ORGANIZED HEALTH CARE EDUCATION/TRAINING PROGRAM

## 2025-07-07 RX ORDER — AMOXICILLIN AND CLAVULANATE POTASSIUM 400; 57 MG/5ML; MG/5ML
800 POWDER, FOR SUSPENSION ORAL EVERY 12 HOURS
Qty: 200 ML | Refills: 0 | Status: SHIPPED | OUTPATIENT
Start: 2025-07-07 | End: 2025-07-17

## 2025-07-07 RX ORDER — PROMETHAZINE HYDROCHLORIDE 25 MG/1
TABLET ORAL
COMMUNITY

## 2025-07-07 RX ORDER — L. ACIDOPHILUS/L.BULGARICUS 1MM CELL
1 TABLET ORAL 3 TIMES DAILY
COMMUNITY

## 2025-07-07 RX ORDER — FLUCONAZOLE 200 MG/1
400 TABLET ORAL
COMMUNITY
Start: 2025-02-27

## 2025-07-07 RX ORDER — VIT C/E/ZN/COPPR/LUTEIN/ZEAXAN 250MG-90MG
5000 CAPSULE ORAL
COMMUNITY
Start: 2025-07-02 | End: 2026-07-29

## 2025-07-07 RX ORDER — CHLOPHEDIANOL HCL AND PYRILAMINE MALEATE 12.5; 12.5 MG/5ML; MG/5ML
5-10 SOLUTION ORAL
Qty: 118 ML | Refills: 0 | Status: SHIPPED | OUTPATIENT
Start: 2025-07-07

## 2025-07-07 RX ORDER — AMOXICILLIN AND CLAVULANATE POTASSIUM 875; 125 MG/1; MG/1
1 TABLET, FILM COATED ORAL EVERY 12 HOURS
Qty: 20 TABLET | Refills: 0 | Status: SHIPPED | OUTPATIENT
Start: 2025-07-07 | End: 2025-07-07 | Stop reason: ALTCHOICE

## 2025-07-07 RX ORDER — CARVEDILOL 25 MG/1
25 TABLET ORAL 2 TIMES DAILY
COMMUNITY

## 2025-07-07 RX ORDER — FLUTICASONE PROPIONATE 50 MCG
2 SPRAY, SUSPENSION (ML) NASAL DAILY
Qty: 15.8 ML | Refills: 0 | Status: SHIPPED | OUTPATIENT
Start: 2025-07-07

## 2025-07-07 RX ORDER — SULFAMETHOXAZOLE AND TRIMETHOPRIM 800; 160 MG/1; MG/1
1 TABLET ORAL
COMMUNITY
Start: 2025-02-27

## 2025-07-07 RX ORDER — SIMETHICONE 80 MG
80 TABLET,CHEWABLE ORAL EVERY 6 HOURS
COMMUNITY

## 2025-07-07 RX ORDER — ONDANSETRON 4 MG/1
TABLET, FILM COATED ORAL
COMMUNITY
Start: 2025-03-16

## 2025-07-07 RX ORDER — VALSARTAN 80 MG/1
80 TABLET ORAL
COMMUNITY

## 2025-07-07 RX ORDER — NALOXEGOL OXALATE 25 MG/1
25 TABLET, FILM COATED ORAL
COMMUNITY

## 2025-07-07 RX ORDER — VALGANCICLOVIR 450 MG/1
450 TABLET, FILM COATED ORAL
COMMUNITY
Start: 2025-02-27

## 2025-07-07 RX ORDER — METHOCARBAMOL 500 MG/1
500 TABLET, FILM COATED ORAL 3 TIMES DAILY PRN
COMMUNITY
Start: 2025-05-16

## 2025-07-07 RX ORDER — PREDNISONE 5 MG/1
5 TABLET ORAL
COMMUNITY
Start: 2025-05-17

## 2025-07-07 NOTE — PROGRESS NOTES
"Subjective:      Patient ID: Elda Hernandez is a 55 y.o. female.    Vitals:  height is 4' 11" (1.499 m) and weight is 51.7 kg (114 lb). Her oral temperature is 98.1 °F (36.7 °C). Her blood pressure is 137/73 and her pulse is 52 (abnormal). Her respiration is 16 and oxygen saturation is 100%.     Chief Complaint: Sore Throat    Patient is a 55-year-old female with a past medical history of seizures, migraine, Arnold-Chiari syndrome, anxiety, depression, hypertension, hyperlipidemia, chronic kidney disease, thrombocytopenia, hypothyroidism, osteoporosis, status post liver transplant, insomnia and squamous cell carcinoma who presents to clinic for evaluation of sore throat.  Patient states has had symptoms for about 7-8 days now.  Patient with daughter who had similar symptoms last week and was told everything was negative.  Patient states that she is vaccinated.  Patient states taking over-the-counter NyQuil and Tylenol with only mild temporary relief to symptoms.  Patient states greatest complaint is that of sinus pressure causing headaches and sore throat.  Patient requesting something along the lines of viscous lidocaine.    Sore Throat   This is a new problem. The current episode started 1 to 4 weeks ago. The problem has been unchanged. There has been no fever. Associated symptoms include congestion, coughing, ear pain (Bilateral) and headaches. Pertinent negatives include no abdominal pain, diarrhea, ear discharge, shortness of breath or vomiting.       Constitution: Positive for appetite change, chills and fatigue.   HENT:  Positive for ear pain (Bilateral), congestion, postnasal drip and sore throat. Negative for ear discharge, tinnitus and hearing loss.    Neck: neck negative.   Cardiovascular: Negative.  Negative for chest pain and palpitations.   Eyes: Negative.    Respiratory:  Positive for cough. Negative for chest tightness, sputum production and shortness of breath.    Gastrointestinal: Negative.  " Negative for abdominal pain, nausea, vomiting and diarrhea.   Endocrine: negative.   Genitourinary: Negative.  Negative for dysuria, frequency and urgency.   Musculoskeletal: Negative.  Negative for muscle ache.   Skin: Negative.  Negative for color change, pale, rash and erythema.   Allergic/Immunologic: Negative.    Neurological:  Positive for headaches. Negative for dizziness, light-headedness, passing out, disorientation and altered mental status.   Hematologic/Lymphatic: Negative.    Psychiatric/Behavioral: Negative.  Negative for altered mental status, disorientation and confusion.       Objective:     Physical Exam   Constitutional: She is oriented to person, place, and time. She appears well-developed. She is cooperative.  Non-toxic appearance. She does not appear ill. No distress.   HENT:   Head: Normocephalic and atraumatic.   Ears:   Right Ear: Hearing, external ear and ear canal normal. A middle ear effusion is present.   Left Ear: Hearing, external ear and ear canal normal. A middle ear effusion is present.   Nose: Congestion present. No mucosal edema, rhinorrhea or nasal deformity. No epistaxis. Right sinus exhibits maxillary sinus tenderness and frontal sinus tenderness. Left sinus exhibits maxillary sinus tenderness and frontal sinus tenderness.   Mouth/Throat: Uvula is midline and mucous membranes are normal. Mucous membranes are moist. No trismus in the jaw. Normal dentition. No uvula swelling. Posterior oropharyngeal erythema (Mildly erythemic without cobblestoning, exudate or edema.) present. No oropharyngeal exudate. Oropharynx is clear.   Eyes: Conjunctivae and lids are normal. Pupils are equal, round, and reactive to light. Right eye exhibits no discharge. Left eye exhibits no discharge. No scleral icterus.   Neck: Trachea normal and phonation normal. Neck supple. No neck rigidity present.   Cardiovascular: Regular rhythm, normal heart sounds and normal pulses. Bradycardia present.    Pulmonary/Chest: Effort normal and breath sounds normal. No respiratory distress (Unlabored.  Equal rise and fall of chest.  Able speak in full complete sentences.  No adventitious breath sounds noted.). She has no wheezes. She has no rhonchi. She has no rales.   Abdominal: Normal appearance and bowel sounds are normal. She exhibits no distension. Soft. There is no abdominal tenderness.   Musculoskeletal: Normal range of motion.         General: Normal range of motion.      Cervical back: She exhibits no tenderness.   Lymphadenopathy:     She has no cervical adenopathy.   Neurological: She is alert and oriented to person, place, and time. She exhibits normal muscle tone.   Skin: Skin is warm, dry, intact, not diaphoretic, not pale and no rash. Capillary refill takes 2 to 3 seconds. No erythema   Psychiatric: Her speech is normal and behavior is normal. Judgment and thought content normal.   Nursing note and vitals reviewed.      Assessment:     1. Sore throat    2. Acute non-recurrent pansinusitis    3. Liver transplanted    4. Middle ear effusion, bilateral    5. Pharyngitis, unspecified etiology        Plan:       Sore throat  -     SARS Coronavirus 2 Antigen, POCT Manual Read  -     POCT rapid strep A  -     POCT Influenza A/B Rapid Antigen    Acute non-recurrent pansinusitis    Liver transplanted    Middle ear effusion, bilateral    Pharyngitis, unspecified etiology    Other orders  -     Discontinue: amoxicillin-clavulanate 875-125mg (AUGMENTIN) 875-125 mg per tablet; Take 1 tablet by mouth every 12 (twelve) hours. for 10 days  Dispense: 20 tablet; Refill: 0  -     fluticasone propionate (FLONASE) 50 mcg/actuation nasal spray; 2 sprays (100 mcg total) by Each Nostril route once daily.  Dispense: 15.8 mL; Refill: 0  -     pyrilamine-chlophedianoL (NINJACOF) 12.5-12.5 mg/5 mL Liqd; Take 5-10 mLs by mouth every 6 to 8 hours as needed (Cough).  Dispense: 118 mL; Refill: 0  -     diphenhydrAMINE-aluminum-magnesium  hydroxide-simethicone-LIDOcaine viscous HCl 2%; Swish and spit 15 mLs every 4 (four) hours as needed (Sore throat/oral pain).  Dispense: 180 each; Refill: 0  -     amoxicillin-clavulanate (AUGMENTIN) 400-57 mg/5 mL SusR; Take 10 mLs (800 mg total) by mouth every 12 (twelve) hours. for 10 days  Dispense: 200 mL; Refill: 0                Labs:  Influenza a and B negative.  COVID negative.  Rapid strep negative.  Take medications as prescribed.  Tylenol/Motrin per package instructions for any pain or fever.  Assure adequate hydration and rest.  Throat lozenges or Chloraseptic per package instructions for sore throat.    Warm salt water gargles every 2-3 hours as needed for sore throat.    Nasal saline flushes or irrigation as directed for nasal saline congestion and sinus related symptoms.  Follow-up with PCP or transplant team in 1-2 days.  Return to clinic as needed.  To ED for any new or acutely worsening symptoms.  Patient in agreement with plan of care.    DISCLAIMER: Please note that my documentation in this Electronic Healthcare Record was produced using speech recognition software and therefore may contain errors related to that software system.These could include grammar, punctuation and spelling errors or the inclusion/exclusion of phrases that were not intended. Garbled syntax, mangled pronouns, and other bizarre constructions may be attributed to that software system.

## 2025-07-10 ENCOUNTER — TELEPHONE (OUTPATIENT)
Dept: URGENT CARE | Facility: CLINIC | Age: 55
End: 2025-07-10
Payer: MEDICARE

## 2025-07-10 NOTE — TELEPHONE ENCOUNTER
Called patient to follow up with visit, spoke to pts  states that pt is currently at [facility] getting a blood transfusion at this time.

## 2025-07-16 ENCOUNTER — TELEPHONE (OUTPATIENT)
Dept: FAMILY MEDICINE | Facility: CLINIC | Age: 55
End: 2025-07-16
Payer: MEDICARE

## 2025-07-16 ENCOUNTER — LAB VISIT (OUTPATIENT)
Dept: LAB | Facility: HOSPITAL | Age: 55
End: 2025-07-16
Payer: MEDICARE

## 2025-07-16 DIAGNOSIS — Z48.23 ENCOUNTER FOR AFTERCARE FOLLOWING LIVER TRANSPLANT: ICD-10-CM

## 2025-07-16 DIAGNOSIS — Z94.4 LIVER REPLACED BY TRANSPLANT: Primary | ICD-10-CM

## 2025-07-16 DIAGNOSIS — C22.1 MALIGNANT NEOPLASM OF INTRAHEPATIC BILE DUCTS: ICD-10-CM

## 2025-07-16 LAB
ABSOLUTE NEUTROPHIL MANUAL (OHS): 1.1 K/UL
ALBUMIN SERPL BCP-MCNC: 2.6 G/DL (ref 3.5–5.2)
ALP SERPL-CCNC: 103 UNIT/L (ref 40–150)
ALT SERPL W/O P-5'-P-CCNC: 13 UNIT/L (ref 10–44)
ANION GAP (OHS): 6 MMOL/L (ref 8–16)
ANISOCYTOSIS BLD QL SMEAR: SLIGHT
AST SERPL-CCNC: 19 UNIT/L (ref 11–45)
BILIRUB SERPL-MCNC: 0.3 MG/DL (ref 0.1–1)
BUN SERPL-MCNC: 42 MG/DL (ref 6–20)
CALCIUM SERPL-MCNC: 7.6 MG/DL (ref 8.7–10.5)
CHLORIDE SERPL-SCNC: 99 MMOL/L (ref 95–110)
CO2 SERPL-SCNC: 31 MMOL/L (ref 23–29)
CREAT SERPL-MCNC: 2.7 MG/DL (ref 0.5–1.4)
EOSINOPHIL NFR BLD MANUAL: 4 % (ref 0–8)
ERYTHROCYTE [DISTWIDTH] IN BLOOD BY AUTOMATED COUNT: 14.6 % (ref 11.5–14.5)
GFR SERPLBLD CREATININE-BSD FMLA CKD-EPI: 20 ML/MIN/1.73/M2
GGT SERPL-CCNC: 57 U/L (ref 8–55)
GLUCOSE SERPL-MCNC: 77 MG/DL (ref 70–110)
HCT VFR BLD AUTO: 19.1 % (ref 37–48.5)
HGB BLD-MCNC: 6.1 GM/DL (ref 12–16)
HYPOCHROMIA BLD QL SMEAR: ABNORMAL
INR PPP: 1 (ref 0.8–1.2)
LYMPHOCYTES NFR BLD MANUAL: 17 % (ref 18–48)
MAGNESIUM SERPL-MCNC: 1.4 MG/DL (ref 1.6–2.6)
MCH RBC QN AUTO: 30.2 PG (ref 27–31)
MCHC RBC AUTO-ENTMCNC: 31.9 G/DL (ref 32–36)
MCV RBC AUTO: 95 FL (ref 82–98)
MONOCYTES NFR BLD MANUAL: 18 % (ref 4–15)
NEUTROPHILS NFR BLD MANUAL: 61 % (ref 38–73)
NUCLEATED RBC (/100WBC) (OHS): 0 /100 WBC
OVALOCYTES BLD QL SMEAR: ABNORMAL
PLATELET # BLD AUTO: 62 K/UL (ref 150–450)
PLATELET BLD QL SMEAR: ABNORMAL
PMV BLD AUTO: 11.9 FL (ref 9.2–12.9)
POIKILOCYTOSIS BLD QL SMEAR: SLIGHT
POTASSIUM SERPL-SCNC: 4.2 MMOL/L (ref 3.5–5.1)
PROT SERPL-MCNC: 5.2 GM/DL (ref 6–8.4)
PROTHROMBIN TIME: 11.4 SECONDS (ref 9–12.5)
RBC # BLD AUTO: 2.02 M/UL (ref 4–5.4)
SODIUM SERPL-SCNC: 136 MMOL/L (ref 136–145)
TACROLIMUS BLD-MCNC: 4.2 NG/ML (ref 5–15)
WBC # BLD AUTO: 1.75 K/UL (ref 3.9–12.7)

## 2025-07-16 PROCEDURE — 83735 ASSAY OF MAGNESIUM: CPT

## 2025-07-16 PROCEDURE — 80197 ASSAY OF TACROLIMUS: CPT

## 2025-07-16 PROCEDURE — 80053 COMPREHEN METABOLIC PANEL: CPT

## 2025-07-16 PROCEDURE — 85610 PROTHROMBIN TIME: CPT

## 2025-07-16 PROCEDURE — 36415 COLL VENOUS BLD VENIPUNCTURE: CPT | Mod: PO

## 2025-07-16 PROCEDURE — 82977 ASSAY OF GGT: CPT

## 2025-07-16 PROCEDURE — 85007 BL SMEAR W/DIFF WBC COUNT: CPT

## 2025-07-16 NOTE — TELEPHONE ENCOUNTER
Called with cbc results. Spoke with , will need to return to ER for transfusion today. He understands and will bring her.

## 2025-07-18 ENCOUNTER — TELEPHONE (OUTPATIENT)
Dept: FAMILY MEDICINE | Facility: CLINIC | Age: 55
End: 2025-07-18
Payer: MEDICARE

## 2025-07-18 LAB
CMV DNA SERPL NAA+PROBE-ACNC: <30 IU/ML
CMV DNA SERPL NAA+PROBE-LOG IU: <1.48 LOGIU/ML
CYTOMEGALOVIRUS DNA, QUAL (OHS): DETECTED

## 2025-08-19 ENCOUNTER — PATIENT MESSAGE (OUTPATIENT)
Dept: FAMILY MEDICINE | Facility: CLINIC | Age: 55
End: 2025-08-19
Payer: MEDICARE

## 2025-08-19 DIAGNOSIS — F32.1 CURRENT MODERATE EPISODE OF MAJOR DEPRESSIVE DISORDER WITHOUT PRIOR EPISODE: Primary | ICD-10-CM

## 2025-08-19 DIAGNOSIS — F32.A DEPRESSION, UNSPECIFIED DEPRESSION TYPE: ICD-10-CM

## 2025-08-19 RX ORDER — VENLAFAXINE HYDROCHLORIDE 150 MG/1
150 CAPSULE, EXTENDED RELEASE ORAL DAILY
Qty: 90 CAPSULE | Refills: 3 | Status: CANCELLED | OUTPATIENT
Start: 2025-08-19

## 2025-08-21 RX ORDER — VENLAFAXINE 75 MG/1
75 TABLET ORAL 2 TIMES DAILY
Qty: 180 TABLET | Refills: 3 | Status: SHIPPED | OUTPATIENT
Start: 2025-08-21 | End: 2026-08-21

## 2025-08-30 PROCEDURE — 85610 PROTHROMBIN TIME: CPT | Performed by: TRANSPLANT SURGERY

## 2025-08-30 PROCEDURE — 80053 COMPREHEN METABOLIC PANEL: CPT | Performed by: TRANSPLANT SURGERY

## 2025-08-30 PROCEDURE — 82977 ASSAY OF GGT: CPT | Performed by: TRANSPLANT SURGERY

## 2025-08-30 PROCEDURE — 83735 ASSAY OF MAGNESIUM: CPT | Performed by: TRANSPLANT SURGERY

## (undated) DEVICE — STAPLER SKIN PROXIMATE WIDE

## (undated) DEVICE — SEALER LIGASURE MARYLAND 23CM

## (undated) DEVICE — SEE MEDLINE ITEM 146417

## (undated) DEVICE — DRAIN CHANNEL ROUND 19FR

## (undated) DEVICE — SUT PDS II O CTX MONO 60

## (undated) DEVICE — DRAPE ABDOMINAL TIBURON 14X11

## (undated) DEVICE — EVACUATOR WOUND BULB 100CC

## (undated) DEVICE — BLADE 4 INCH EDGE UN-INS

## (undated) DEVICE — SUT 1 48IN PDS II VIO MONO

## (undated) DEVICE — DRESSING ADH ISLAND 3.6 X 14

## (undated) DEVICE — TOWEL OR XRAY WHITE 17X26IN

## (undated) DEVICE — DRESSING ABTHERA SENSA TRAC

## (undated) DEVICE — CONTAINER SPECIMEN STRL 4OZ

## (undated) DEVICE — CANISTER INFOV.A.C WOUND 500ML

## (undated) DEVICE — Device

## (undated) DEVICE — SUT SILK 0 SH 30IN BLK BR

## (undated) DEVICE — DRAPE INCISE IOBAN 2 23X17IN

## (undated) DEVICE — DRESSING INFOVAC MED RND BLK

## (undated) DEVICE — ELECTRODE REM PLYHSV RETURN 9

## (undated) DEVICE — GLOVE GAMMEX SURG LF PI SZ 7.5

## (undated) DEVICE — SEE MEDLINE ITEM 156902